# Patient Record
Sex: FEMALE | Race: BLACK OR AFRICAN AMERICAN | HISPANIC OR LATINO | URBAN - METROPOLITAN AREA
[De-identification: names, ages, dates, MRNs, and addresses within clinical notes are randomized per-mention and may not be internally consistent; named-entity substitution may affect disease eponyms.]

---

## 2002-04-01 RX ADMIN — Medication 250 MILLIGRAM(S): at 11:35

## 2016-12-12 NOTE — H&P ADULT. - PROBLEM SELECTOR PLAN 1
Admit 7 ABBIE/BMTU  TLC insertion today  Remove PICC line today  Star IV hydration and Melphalan today  Monitor blood counts, transfuse PRN, monitor electrolytes, supplement as need.  Monitor LFTs.  Monitor vital signs Q4hrs.  Maintain strict I/O  Protonix for GI protection.  Mouth care Mucositis prevention.  Antiviral, antifungal prophylaxis., start Cipro when ANC less than 1000.  Antiemetics PRN.  Manage pain, constipation PRN.  Start Heparin for VOD prophylaxis.  Glutamine and Heparin fro VOD prophylaxis.  Actigall fro liver protection. Admit 7 ABBIE/BMTU  TLCL insertion today by IR  Remove PICC line today  Star IV hydration as per transplant protocol  Melphalan 100 mg/m2 IV days -4, -3, followed by 2 days of rest.  Autologous PSCT on day 0(12/16/16).  Begin Zarxio 480 mcg sq daily on day 0, until engraftment with WBC recovery.  Monitor blood counts, transfuse PRN, monitor electrolytes, supplement as need.  Monitor LFTs.  Monitor vital signs Q4hrs.  Maintain strict I/O, daily weight, diurese with Lasix to keep O>I  Protonix for GI protection.  Mouth care and skin care as per transplant protocol.  Antiemetics PRN.  Manage pain, constipation PRN.

## 2016-12-12 NOTE — H&P ADULT. - PMH
Chronic gastritis, presence of bleeding unspecified, unspecified gastritis type    Clostridium difficile diarrhea    Essential hypertension    Intractable migraine with status migrainosus, unspecified migraine type    Leukemia    Sciatica of right side

## 2016-12-12 NOTE — H&P ADULT. - HISTORY OF PRESENT ILLNESS
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  The patient is a 56 year old woman with [place] chromosome positive ALL who has had 4 cycles R-HyperCVAD chemotherapy and  IT MTX twice and    s/p   step 1 high dose VP16 and cytarabine for stem cell mobilization for autologous stem cell transplant.  Recent bone marrow biopsy, FISH and PCR were negative. She presents today from home for her autologous stem cell  transplant. Denies any chest pain, palpitations, SOB, or dizziness. 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  The patient is a 56 year old woman with [place] chromosome positive ALL who was treated with 4 cycles R-HyperCVAD chemotherapy and  IT MTX twice and then Step 1 high dose VP16 and Cytarabine for intensification and stem cell mobilization for autologous peripheral stem cell transplant.  Recent bone marrow biopsy(12/1/16) with molecular CR, FISH and PCR were negative. She presents today from home for her autologous stem cell  transplant. Denies any chest pain, palpitations, SOB, or dizziness.

## 2016-12-12 NOTE — H&P ADULT. - ASSESSMENT
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

## 2016-12-12 NOTE — ADVANCED PRACTICE NURSE CONSULT - REASON FOR CONSULT
pt. was admitted for autologous  transplant, pt . has her TLC placed in the IR Today. for high dose chemo . and hydration and auto transplant. TLC intact , drsg dry and intact

## 2016-12-12 NOTE — PATIENT PROFILE ADULT. - VISION (WITH CORRECTIVE LENSES IF THE PATIENT USUALLY WEARS THEM):
Partially impaired: cannot see medication labels or newsprint, but can see obstacles in path, and the surrounding layout; can count fingers at arm's length

## 2016-12-12 NOTE — H&P ADULT. - PROBLEM SELECTOR PLAN 2
Antibacterial, antifungal, antiviral prophylaxis, as per transplant protocol.  Begin Actigall, glutamine supplement, low-dose heparin drip for VOD/SOS prophylaxis.  GI prophylaxis with PPI  Kepivance days 0, +1, +2 for prevention of mucositis.

## 2016-12-12 NOTE — ADVANCED PRACTICE NURSE CONSULT - ASSESSMENT
Pt was started with hydration D5NS at 200cc/hr, heparin at 4.4cc/hr .and NS at 20cc/hr.decadron was given IV.zofran 8mgs given IV and Ativan IV was refused by the pt. Melphalan was checked and re-checked with second RN on the floor started at 2040H [id number] finished at 2140H including the flushed. no reaction noted.

## 2016-12-12 NOTE — ADVANCED PRACTICE NURSE CONSULT - RECOMMEDATIONS
continue present meds, hydration and heparin drip. complainedofpain on TLC site , and cough, NP Marilyn made aware, pt already had her first dose of oxycodone at 1500H , but wants another dose, provider ordered oxycodone 5mgs po and tessalon perle x1 , as per pt. she felt better , lasix 20mgs given pt is behind with her output. and started urinating. will continue to monitor.

## 2016-12-14 NOTE — DIETITIAN INITIAL EVALUATION ADULT. - ADHERENCE
Pt reports she will try not to use salt in her food and will season her foods more often with Mrs. Dash

## 2016-12-14 NOTE — DIETITIAN INITIAL EVALUATION ADULT. - ENERGY NEEDS
Pt seen for new admit stem cell transplant. Pt with PMH including PH+ ALL S/P chemotherapy now admitted for autologous PBSCT day -2.     Ht:5'2" , Wt:230.6 lbs, BMI:42.2 kg/m2, IBW:110 lbs +/- 10%, %IBW: 236%  No edema, Skin intact

## 2016-12-14 NOTE — DIETITIAN INITIAL EVALUATION ADULT. - PROBLEM SELECTOR PLAN 1
Admit 7 ABBIE/BMTU  TLCL insertion today by IR  Remove PICC line today  Star IV hydration as per transplant protocol  Melphalan 100 mg/m2 IV days -4, -3, followed by 2 days of rest.  Autologous PSCT on day 0(12/16/16).  Begin Zarxio 480 mcg sq daily on day 0, until engraftment with WBC recovery.  Monitor blood counts, transfuse PRN, monitor electrolytes, supplement as need.  Monitor LFTs.  Monitor vital signs Q4hrs.  Maintain strict I/O, daily weight, diurese with Lasix to keep O>I  Protonix for GI protection.  Mouth care and skin care as per transplant protocol.  Antiemetics PRN.  Manage pain, constipation PRN.

## 2016-12-14 NOTE — DIETITIAN INITIAL EVALUATION ADULT. - NS AS NUTRI INTERV MEALS SNACK
1. Continue regular diet with glutasolve once daily, 2. Continue to encourage po intake and obtain/honor food preferences as able, 3. Continue to review food safety diet education as able, 3. Monitor PO intake, diet tolerance, weight trends, labs, and skin integrity, 4. RD to remain available as needed

## 2016-12-14 NOTE — DIETITIAN INITIAL EVALUATION ADULT. - ORAL INTAKE PTA
good/Pt reports eating well with good appetite consuming 2-3 meals per day. Breakfast: Interior instant breakfast or egg, Lunch: usually canned soup, Dinner: canned soup or protein and starch such as chicken and potato. Pt states she has been too tired to cook and has been doing mostly ready prepared foods.

## 2016-12-14 NOTE — DIETITIAN INITIAL EVALUATION ADULT. - NS AS NUTRI INTERV ED CONTENT
Provided brief food safety nutrition education-will review as able, emphasized importance of adequate nutrition during transplant process and reviewed importance and usage of glutasolve supplement

## 2016-12-14 NOTE — DIETITIAN INITIAL EVALUATION ADULT. - OTHER INFO
Pt reports weight fluctuations since the beginning of her illness, stated she previously weighed ~248 lbs and had lost weight then was able to gain a portion back and has since been relatively stable ~230 lbs, noted previous admit weights 232.1 lbs (8/1 standing), 227.7 lbs (11/4), current admit weight 230.6 lbs (12/12 standing). Pt reports food allergy to shellfish-will experience nausea and vomiting when consumed, pt reports previous intolerance to regular milk causing slight abdominal discomfort, has been consuming lactaid milk and feeling better. Pt taking folic acid PTA. Pt with no current GI distress, last BM yesterday. Pt with no difficulty chewing/swallowing. In house pt reports eating fairly, will generally consume ~75% of meals.

## 2016-12-27 NOTE — DISCHARGE NOTE ADULT - HOME CARE AGENCY
[facility] for medication teaching reinforcement with start of care for Saturday, 12/31/16. NARAYAN([phone]) for medication teaching reinforcement with start of care for Saturday, 12/31/16. NARAYAN([phone]) for medication teaching reinforcement with start of care for Thursday, 1/5/17.

## 2016-12-27 NOTE — DISCHARGE NOTE ADULT - CARE PROVIDERS DIRECT ADDRESSES
[email or web address]

## 2016-12-27 NOTE — DISCHARGE NOTE ADULT - CARE PLAN
Goal:	Complete blood count recovery, remain infection free, maintain optimal functional and nutritional status.  Instructions for follow-up, activity and diet:	Notify your physician if bleeding; swelling; persistent nausea and vomiting; unable to urinate; pain not relieved by medications; fever; numbness, tingling; excessive diarrhea, inability to tolerate liquids or foods; increased irritability or sluggishness, rash Principal Discharge DX:	Stem cells transplant status  Goal:	Complete blood count recovery, remain infection free, maintain optimal functional and nutritional status.  Instructions for follow-up, activity and diet:	Notify your physician if bleeding; swelling; persistent nausea and vomiting; unable to urinate; pain not relieved by medications; fever; numbness, tingling; excessive diarrhea, inability to tolerate liquids or foods; increased irritability or sluggishness, rash  Secondary Diagnosis:	Prophylactic measure  Goal:	Free of infection  Instructions for follow-up, activity and diet:	Continue on prophylactic antimicrobials as directed  Mepron - To prevent bacterial infection  Prilosec -GI prophylaxis  Acyclovir - To prevent viral infection  Secondary Diagnosis:	Fungal pneumonia  Goal:	Controlled  Instructions for follow-up, activity and diet:	Continue on Voriconazole as directed  Secondary Diagnosis:	Essential hypertension  Goal:	Controlled Principal Discharge DX:	Stem cells transplant status  Goal:	Complete blood count recovery, remain infection free, maintain optimal functional and nutritional status.  Instructions for follow-up, activity and diet:	Notify your physician if bleeding; swelling; persistent nausea and vomiting; unable to urinate; pain not relieved by medications; fever; numbness, tingling; excessive diarrhea, inability to tolerate liquids or foods; increased irritability or sluggishness, rash  Secondary Diagnosis:	Prophylactic measure  Goal:	Free of infection  Instructions for follow-up, activity and diet:	Continue on prophylactic antimicrobials as directed  Mepron - To prevent bacterial infection  Prilosec -GI prophylaxis  Acyclovir - To prevent viral infection  Secondary Diagnosis:	Fungal pneumonia  Goal:	Controlled  Instructions for follow-up, activity and diet:	Continue on Voriconazole as directed  Secondary Diagnosis:	Essential hypertension  Goal:	Controlled  Instructions for follow-up, activity and diet:	Activity as tolerated.  Take all medication as prescribed.  Follow up with your medical doctor for routine blood pressure monitoring at your next visit.  Notify your doctor if you have any of the following symptoms:   Dizziness, Lightheadedness, Blurry vision, Headache, Chest pain, Shortness of breath Principal Discharge DX:	Stem cells transplant status  Goal:	Complete blood count recovery, remain infection free, maintain optimal functional and nutritional status.  Instructions for follow-up, activity and diet:	Notify your physician if bleeding; swelling; persistent nausea and vomiting; unable to urinate; pain not relieved by medications; fever; numbness, tingling; excessive diarrhea, inability to tolerate liquids or foods; increased irritability or sluggishness, rash  Notify your doctor or go to the emergency room for a fever of 100.4F or greater, or a fever accompanied by shaking chills  Secondary Diagnosis:	Prophylactic measure  Goal:	Free of infection  Instructions for follow-up, activity and diet:	Continue on prophylactic antimicrobials as directed  Mepron - To prevent bacterial infection  Prilosec -GI prophylaxis  Acyclovir - To prevent viral infection  Secondary Diagnosis:	Fungal pneumonia  Goal:	Controlled  Instructions for follow-up, activity and diet:	Continue on Voriconazole as directed  Secondary Diagnosis:	Essential hypertension  Goal:	Controlled  Instructions for follow-up, activity and diet:	Activity as tolerated.  Take all medication as prescribed.  Follow up with your medical doctor for routine blood pressure monitoring at your next visit.  Notify your doctor if you have any of the following symptoms:   Dizziness, Lightheadedness, Blurry vision, Headache, Chest pain, Shortness of breath

## 2016-12-27 NOTE — DISCHARGE NOTE ADULT - CARE PROVIDER_API CALL
Luke Bentley), Internal Medicine; Medical Oncology  [address] [place] [address]  Phone: [phone]  Fax: [phone] Luke Bentley), Internal Medicine; Medical Oncology  [address] [place] [address]  Phone: [phone]  Fax: [phone]    Margo Burk (NP; RN), NP in Adult Health  [address] [place] [address]  Phone: [phone]  Fax: [phone]

## 2016-12-27 NOTE — DISCHARGE NOTE ADULT - NS AS ACTIVITY OBS
Do not make important decisions/Bathing allowed/No Heavy lifting/straining/Do not drive or operate machinery/Walking-Indoors allowed/Showering allowed

## 2016-12-27 NOTE — DISCHARGE NOTE ADULT - PATIENT PORTAL LINK FT
“You can access the FollowHealth Patient Portal, offered by [facility], by registering with the following website: [email or web address]”

## 2016-12-27 NOTE — DISCHARGE NOTE ADULT - REASON FOR ADMISSION
Admitted for admitted for high dose chemotherapy with Melphalan  (dosed at 100mg / m2  x 2 days) followed by autologous stem cell transplant. Autologous peripheral blood stem cell transplant with high dose chemotherapy preparative regimen for the treatment of ALL Ph +

## 2016-12-27 NOTE — DISCHARGE NOTE ADULT - INSTRUCTIONS
Diet and activities as per [facility] discharge guidelines and safe food handling guidelines. NO RESTAURANT OR TAKE OUT FOOD AT THIS TIME, ONLY HOME COOKED PREPARED/FROZEN FOODS. You are allowed to have fresh baked pizza right out of the oven. This is the ONLY takeout food at this time. none

## 2016-12-27 NOTE — DISCHARGE NOTE ADULT - SOCIAL WORKER'S NAME
Nati Fontenot LMSW ([phone]) will remain available at [facility]. Nati Fontenot LMSW ([phone]) will remain available at [facility].

## 2016-12-27 NOTE — DISCHARGE NOTE ADULT - PLAN OF CARE
Notify your physician if bleeding; swelling; persistent nausea and vomiting; unable to urinate; pain not relieved by medications; fever; numbness, tingling; excessive diarrhea, inability to tolerate liquids or foods; increased irritability or sluggishness, rash Complete blood count recovery, remain infection free, maintain optimal functional and nutritional status. Free of infection Continue on prophylactic antimicrobials as directed  Mepron - To prevent bacterial infection  Prilosec -GI prophylaxis  Acyclovir - To prevent viral infection Controlled Continue on Voriconazole as directed Activity as tolerated.  Take all medication as prescribed.  Follow up with your medical doctor for routine blood pressure monitoring at your next visit.  Notify your doctor if you have any of the following symptoms:   Dizziness, Lightheadedness, Blurry vision, Headache, Chest pain, Shortness of breath Notify your physician if bleeding; swelling; persistent nausea and vomiting; unable to urinate; pain not relieved by medications; fever; numbness, tingling; excessive diarrhea, inability to tolerate liquids or foods; increased irritability or sluggishness, rash  Notify your doctor or go to the emergency room for a fever of 100.4F or greater, or a fever accompanied by shaking chills

## 2016-12-27 NOTE — DISCHARGE NOTE ADULT - MEDICATION SUMMARY - MEDICATIONS TO TAKE
I will START or STAY ON the medications listed below when I get home from the hospital:    Cozaar 100 mg oral tablet  -- 1 tab(s) by mouth once a day  -- Indication: For HTN    Zofran 8 mg oral tablet  -- 1 tab(s) by mouth every 8 hours, As Needed for nausea  -- Indication: For As needed for nausea     voriconazole 200 mg oral tablet  -- 1 tab(s) by mouth every 12 hours MDD:2 tabs  -- Indication: For Lung nodule     acyclovir 400 mg oral tablet  -- 1 tab(s) by mouth every 8 hours MDD:3 tabs  -- Indication: For Antiviral prophylaxis     Mepron 750 mg/5 mL oral suspension  -- 5 milliliter(s) by mouth 2 times a day MDD:10 ml  -- Indication: For PCP prophylaxis     PriLOSEC 20 mg oral delayed release capsule  -- 1 cap(s) by mouth once a day  -- Indication: For GI prophylaxis     Multiple Vitamins oral tablet  -- 1 tab(s) by mouth once a day MDD:1 tab  -- Indication: For Supplement     folic acid 1 mg oral tablet  -- 1 tab(s) by mouth once a day MDD:1 tab  -- Indication: For Supplement

## 2016-12-27 NOTE — DISCHARGE NOTE ADULT - HOSPITAL COURSE
On admission, Mrs Galeas was scheduled for a triple lumen catheter A low dose heparin drip (dosed at 100 units / kg / day) was initiated as well as actigall and glutamine per BMTU protocol to prevent veno-occlusive disease. Throughout admission he received prophylaxis for oral and GI mucositis with PPI. Patient received viral, bacterial, fungal and PCP pneumonia prophylaxis. Patient  was given anti-nausea medications, pain control and anxiolytics PRN as well as diuretics to manage volume overload as needed. Folic acid and multivitamin were given daily throughout hospitalization.  Labs were monitored for CBC with differential, chemistries, coagulopathy, and hepatic profile. 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 Throughout admission he received prophylaxis for oral and GI mucositis with PPI. Patient received viral, bacterial, fungal and PCP pneumonia prophylaxis. Patient  was given anti-nausea medications, pain control and anxiolytics PRN as well as diuretics to manage volume overload as needed. Folic acid and multivitamin were given daily throughout hospitalization.  Labs were monitored for CBC with differential, chemistries, coagulopathy, and hepatic profile. 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    On admission, Mrs Galeas was scheduled for a triple lumen catheter A low dose heparin drip (dosed at 100 units / kg / day) was initiated as well as actigall and glutamine per BMTU protocol to prevent veno-occlusive disease. Throughout admission he received prophylaxis for oral and GI mucositis with PPI. Patient received viral, bacterial, fungal and PCP pneumonia prophylaxis. Patient  was given anti-nausea medications, pain control and anxiolytics PRN as well as diuretics to manage volume overload as needed. Folic acid and multivitamin were given daily throughout hospitalization.  Labs were monitored for CBC with differential, chemistries, coagulopathy, and hepatic profile. Electrolytes were repleted as needed.  On 12/16, pt received 331 ml of autologous, thawed, washed, pooled, mobilized, HPC apheresis. Pt tolerated infusion well. Daily neurogen was started on the same day. Kepivance was administered on 12/16, 12/17 and  12/18  Early engraftment was noted on 12/27  Hospital course was complicated by pancytopenia secondary to chemotherapy and disease and fungal pneumonia. When her ANC dropped below 1000, she was started on prophylactic Ciprofloxacin. When she spiked fever, her antibiotics coverage was broadened. Blood cultures were negative. On 12/22 CT chest revealed new 1 cm right lung nodule and was started on Voriconazole. Throughout hospitalization, Mrs Galeas maintained conditioning through ambulation.     On --- pt was prepared and ready for discharge. TLC was removed without complication. Homecare was coordinated by case management. Pt will follow-up at the [facility] to see Dr Bentley . 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    On admission, Mrs Galeas was scheduled for a triple lumen catheter A low dose heparin drip (dosed at 100 units / kg / day) was initiated as well as actigall and glutamine per BMTU protocol to prevent veno-occlusive disease. Throughout admission he received prophylaxis for oral and GI mucositis with PPI. Patient received viral, bacterial, fungal and PCP pneumonia prophylaxis. Patient  was given anti-nausea medications, pain control and anxiolytics PRN as well as diuretics to manage volume overload as needed. Folic acid and multivitamin were given daily throughout hospitalization.  Labs were monitored for CBC with differential, chemistries, coagulopathy, and hepatic profile. Electrolytes were repleted as needed.  On 12/16, pt received 331 ml of autologous, thawed, washed, pooled, mobilized, HPC apheresis. Pt tolerated infusion well. Daily neurogen was started on the same day. Kepivance was administered on 12/16, 12/17 and  12/18  Early engraftment was noted on 12/26  Hospital course was complicated by pancytopenia secondary to chemotherapy and disease and fungal pneumonia. When her ANC dropped below 1000, she was started on prophylactic Ciprofloxacin. When she spiked fever, her antibiotics coverage was broadened. Blood cultures were negative. On 12/22 CT chest revealed new 1 cm right lung nodule and was started on Voriconazole. Throughout hospitalization, Mrs Galeas maintained conditioning through ambulation.     On --- pt was prepared and ready for discharge. TLC was removed without complication. Homecare was coordinated by case management. Pt will follow-up at the [facility] to see Dr Bentley . 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    On admission, a triple lumen catheter was placed in interventional radiology .  Mrs Gutierrez received pain management, IV hydration, ant nausea medication, physical therapy, nutritional support and supplementation as needed. She also received prophylaxis for oral and GI mucositis with protonix, viral, bacterial, fungal and PCP pneumonia prophylaxis . When her ANC dropped below 1000, she was started on prophylactic ciprofloxacin. When she developed fevers, her antibiotic coverage was broadened. Her blood cultures and CXR remained negative. Patient  was given anti-nausea medications, pain control and anxiolytics PRN as well as diuretics to manage volume overload as needed. Folic acid and multivitamin were given daily throughout hospitalization. Blood work was monitored daily, and she received transfusional support and electrolyte repletion as needed.    On 12/16, pt received 331 ml of autologous, thawed, washed, pooled, mobilized, HPC apheresis. Pt tolerated infusion well. Daily neurogen was started on the same day. Kepivance was administered on 12/16, 12/17 and  12/18  Early engraftment was noted on 12/25. Mrs gutierrez  engrafted on 12/26/16. The zarxio was discontinued on 12/27/16, and the Meropenem was discontinued on ---  Mrs Gutierrez’s hospital course was complicated by pancytopenia secondary to chemotherapy and disease and fungal pneumonia. On 12/22 CT chest revealed new 1 cm right lung nodule and was started on Voriconazole. Throughout hospitalization, Mrs Gutierrez maintained conditioning through ambulation.   Currently, Mrs Gutierrez is hemodynamically stable and stable from a respiratory standpoint for discharge home with follow up at the [facility] as an outpatient. 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    On admission, a triple lumen catheter was placed in interventional radiology.  Mrs Gutierrez received pain management, IV hydration, ant nausea medication, physical therapy, nutritional support and supplementation as needed. She also received prophylaxis for oral and GI mucositis with protonix, viral, bacterial, fungal and PCP pneumonia prophylaxis . When her ANC dropped below 1000, she was started on prophylactic ciprofloxacin. When she developed fevers, her antibiotic coverage was broadened. Her blood cultures and CXR remained negative. Patient  was given anti-nausea medications, pain control and anxiolytics PRN as well as diuretics to manage volume overload as needed. Folic acid and multivitamin were given daily throughout hospitalization. Blood work was monitored daily, and she received transfusional support and electrolyte repletion as needed.    On 12/16, pt received 331 ml of autologous, thawed, washed, pooled, mobilized, HPC apheresis. Pt tolerated infusion well. Daily neurogen was started on the same day. Kepivance was administered on 12/16, 12/17 and  12/18  Early engraftment was noted on 12/25. Mrs gutierrez  engrafted on 12/26/16. The zarxio was discontinued on 12/27/16, and the Meropenem was discontinued on 1/3/17.   Mrs Gutierrez’s hospital course was complicated by pancytopenia secondary to chemotherapy and disease and fungal pneumonia. On 12/22 CT chest revealed new 1 cm right lung nodule and was started on Voriconazole. Ms. Gutierrez also had an RVP panel positive for rhinovirus / enterovirus. She received supportive care and had no complications. Throughout hospitalization, Mrs Gutierrez maintained conditioning through ambulation.   Currently, Mrs Gutierrez is hemodynamically stable and stable from a respiratory standpoint for discharge home with follow up at the [facility] as an outpatient.

## 2017-01-02 NOTE — PROVIDER CONTACT NOTE (OTHER) - ACTION/TREATMENT ORDERED:
Blood cultyres, U/A, urine culture
blood culture, urine culture.
blood culture x2 , ua, and urine culture
continue to monitor
tylenol 650 mg po

## 2017-01-03 NOTE — PROVIDER CONTACT NOTE (CRITICAL VALUE NOTIFICATION) - SITUATION
plt=10
PLT 12
Patient is diagnostic with ALL without remission
RSV+
hgb=6.8    hct=19.2    platelets=3
patient is diagnostic with ALL
patient is diagnostic with multiple myeloma
patient is diagnostic with multiple myeloma
platelets=10
platelets=10
platelets=14
platelets=<2
plt=11
plt=14
plt=17
wbc=9.2

## 2017-01-03 NOTE — PROVIDER CONTACT NOTE (CRITICAL VALUE NOTIFICATION) - NAME OF MD/NP/PA/DO NOTIFIED:
AKSHAT GLASS
José Manuel HAMPTON
Lola Ceballos
QUAN Del Castillo
QUAN Del Castillo
QUAN Quezada
QUNA Del Castillo
ar quick
shasta fu
wolfgang spring
QUAN Martinez
joana mahmood

## 2017-01-05 ENCOUNTER — OUTPATIENT (OUTPATIENT)
Dept: OUTPATIENT SERVICES | Facility: HOSPITAL | Age: 57
LOS: 1 days | End: 2017-01-05
Payer: COMMERCIAL

## 2017-01-05 ENCOUNTER — RESULT REVIEW (OUTPATIENT)
Age: 57
End: 2017-01-05

## 2017-01-05 ENCOUNTER — OUTPATIENT (OUTPATIENT)
Dept: OUTPATIENT SERVICES | Facility: HOSPITAL | Age: 57
LOS: 1 days | Discharge: HOME | End: 2017-01-05

## 2017-01-05 DIAGNOSIS — C91.00 ACUTE LYMPHOBLASTIC LEUKEMIA NOT HAVING ACHIEVED REMISSION: ICD-10-CM

## 2017-01-05 DIAGNOSIS — Z41.9 ENCOUNTER FOR PROCEDURE FOR PURPOSES OTHER THAN REMEDYING HEALTH STATE, UNSPECIFIED: Chronic | ICD-10-CM

## 2017-01-05 DIAGNOSIS — Z98.89 OTHER SPECIFIED POSTPROCEDURAL STATES: Chronic | ICD-10-CM

## 2017-01-06 ENCOUNTER — LABORATORY RESULT (OUTPATIENT)
Age: 57
End: 2017-01-06

## 2017-01-06 ENCOUNTER — APPOINTMENT (OUTPATIENT)
Dept: INFUSION THERAPY | Facility: HOSPITAL | Age: 57
End: 2017-01-06

## 2017-01-06 ENCOUNTER — APPOINTMENT (OUTPATIENT)
Dept: HEMATOLOGY ONCOLOGY | Facility: CLINIC | Age: 57
End: 2017-01-06

## 2017-01-06 LAB
BASOPHILS # BLD AUTO: 0.1 K/UL — SIGNIFICANT CHANGE UP (ref 0–0.2)
BASOPHILS NFR BLD AUTO: 1.3 % — SIGNIFICANT CHANGE UP (ref 0–2)
BLD GP AB SCN SERPL QL: NEGATIVE — SIGNIFICANT CHANGE UP
EOSINOPHIL # BLD AUTO: 0.1 K/UL — SIGNIFICANT CHANGE UP (ref 0–0.5)
EOSINOPHIL NFR BLD AUTO: 1 % — SIGNIFICANT CHANGE UP (ref 0–6)
HCT VFR BLD CALC: 32.1 % — LOW (ref 34.5–45)
HGB BLD-MCNC: 11.3 G/DL — LOW (ref 11.5–15.5)
LYMPHOCYTES # BLD AUTO: 28.8 % — SIGNIFICANT CHANGE UP (ref 13–44)
LYMPHOCYTES # BLD AUTO: 3.1 K/UL — SIGNIFICANT CHANGE UP (ref 1–3.3)
MCHC RBC-ENTMCNC: 34.3 PG — HIGH (ref 27–34)
MCHC RBC-ENTMCNC: 35.4 GM/DL — SIGNIFICANT CHANGE UP (ref 32–36)
MCV RBC AUTO: 97.1 FL — SIGNIFICANT CHANGE UP (ref 80–100)
MONOCYTES # BLD AUTO: 1.3 K/UL — HIGH (ref 0–0.9)
MONOCYTES NFR BLD AUTO: 11.9 % — SIGNIFICANT CHANGE UP (ref 2–14)
NEUTROPHILS # BLD AUTO: 6.1 K/UL — SIGNIFICANT CHANGE UP (ref 1.8–7.4)
NEUTROPHILS NFR BLD AUTO: 57.1 % — SIGNIFICANT CHANGE UP (ref 43–77)
PLATELET # BLD AUTO: 32 K/UL — LOW (ref 150–400)
RBC # BLD: 3.3 M/UL — LOW (ref 3.8–5.2)
RBC # FLD: 18.2 % — HIGH (ref 10.3–14.5)
RH IG SCN BLD-IMP: POSITIVE — SIGNIFICANT CHANGE UP
WBC # BLD: 10.6 K/UL — HIGH (ref 3.8–10.5)
WBC # FLD AUTO: 10.6 K/UL — HIGH (ref 3.8–10.5)

## 2017-01-09 ENCOUNTER — RESULT REVIEW (OUTPATIENT)
Age: 57
End: 2017-01-09

## 2017-01-10 ENCOUNTER — LABORATORY RESULT (OUTPATIENT)
Age: 57
End: 2017-01-10

## 2017-01-10 ENCOUNTER — APPOINTMENT (OUTPATIENT)
Dept: INFUSION THERAPY | Facility: HOSPITAL | Age: 57
End: 2017-01-10

## 2017-01-10 ENCOUNTER — APPOINTMENT (OUTPATIENT)
Dept: HEMATOLOGY ONCOLOGY | Facility: CLINIC | Age: 57
End: 2017-01-10

## 2017-01-10 VITALS
HEART RATE: 110 BPM | WEIGHT: 210.32 LBS | TEMPERATURE: 98.9 F | OXYGEN SATURATION: 100 % | RESPIRATION RATE: 16 BRPM | DIASTOLIC BLOOD PRESSURE: 82 MMHG | SYSTOLIC BLOOD PRESSURE: 114 MMHG | BODY MASS INDEX: 37.5 KG/M2

## 2017-01-10 LAB
BASOPHILS # BLD AUTO: 0.1 K/UL — SIGNIFICANT CHANGE UP (ref 0–0.2)
BASOPHILS NFR BLD AUTO: 1 % — SIGNIFICANT CHANGE UP (ref 0–2)
BLASTS # FLD: 1 % — HIGH (ref 0–0)
BLD GP AB SCN SERPL QL: NEGATIVE — SIGNIFICANT CHANGE UP
EOSINOPHIL # BLD AUTO: 0.2 K/UL — SIGNIFICANT CHANGE UP (ref 0–0.5)
EOSINOPHIL NFR BLD AUTO: 3 % — SIGNIFICANT CHANGE UP (ref 0–6)
HCT VFR BLD CALC: 33.2 % — LOW (ref 34.5–45)
HGB BLD-MCNC: 11 G/DL — LOW (ref 11.5–15.5)
LYMPHOCYTES # BLD AUTO: 2.7 K/UL — SIGNIFICANT CHANGE UP (ref 1–3.3)
LYMPHOCYTES # BLD AUTO: 35 % — SIGNIFICANT CHANGE UP (ref 13–44)
MCHC RBC-ENTMCNC: 32.6 PG — SIGNIFICANT CHANGE UP (ref 27–34)
MCHC RBC-ENTMCNC: 33 GM/DL — SIGNIFICANT CHANGE UP (ref 32–36)
MCV RBC AUTO: 98.5 FL — SIGNIFICANT CHANGE UP (ref 80–100)
MONOCYTES # BLD AUTO: 1.5 K/UL — HIGH (ref 0–0.9)
MONOCYTES NFR BLD AUTO: 21 % — HIGH (ref 2–14)
NEUTROPHILS # BLD AUTO: 2.2 K/UL — SIGNIFICANT CHANGE UP (ref 1.8–7.4)
NEUTROPHILS NFR BLD AUTO: 38 % — LOW (ref 43–77)
NEUTS BAND # BLD: 1 % — SIGNIFICANT CHANGE UP (ref 0–8)
PLAT MORPH BLD: NORMAL — SIGNIFICANT CHANGE UP
PLATELET # BLD AUTO: 31 K/UL — LOW (ref 150–400)
RBC # BLD: 3.37 M/UL — LOW (ref 3.8–5.2)
RBC # FLD: 17.8 % — HIGH (ref 10.3–14.5)
RBC BLD AUTO: SIGNIFICANT CHANGE UP
RH IG SCN BLD-IMP: POSITIVE — SIGNIFICANT CHANGE UP
WBC # BLD: 6.8 K/UL — SIGNIFICANT CHANGE UP (ref 3.8–10.5)
WBC # FLD AUTO: 6.8 K/UL — SIGNIFICANT CHANGE UP (ref 3.8–10.5)

## 2017-01-10 PROCEDURE — 86850 RBC ANTIBODY SCREEN: CPT

## 2017-01-10 PROCEDURE — 86901 BLOOD TYPING SEROLOGIC RH(D): CPT

## 2017-01-10 PROCEDURE — 86900 BLOOD TYPING SEROLOGIC ABO: CPT

## 2017-01-13 ENCOUNTER — RESULT REVIEW (OUTPATIENT)
Age: 57
End: 2017-01-13

## 2017-01-14 ENCOUNTER — APPOINTMENT (OUTPATIENT)
Dept: INFUSION THERAPY | Facility: HOSPITAL | Age: 57
End: 2017-01-14

## 2017-01-14 LAB
ANISOCYTOSIS BLD QL: SLIGHT — SIGNIFICANT CHANGE UP
BASOPHILS # BLD AUTO: 0.2 K/UL — SIGNIFICANT CHANGE UP (ref 0–0.2)
DACRYOCYTES BLD QL SMEAR: SLIGHT — SIGNIFICANT CHANGE UP
ELLIPTOCYTES BLD QL SMEAR: SLIGHT — SIGNIFICANT CHANGE UP
EOSINOPHIL # BLD AUTO: 0.3 K/UL — SIGNIFICANT CHANGE UP (ref 0–0.5)
EOSINOPHIL NFR BLD AUTO: 2 % — SIGNIFICANT CHANGE UP (ref 0–6)
HCT VFR BLD CALC: 31.9 % — LOW (ref 34.5–45)
HGB BLD-MCNC: 10.7 G/DL — LOW (ref 11.5–15.5)
HYPOCHROMIA BLD QL: SLIGHT — SIGNIFICANT CHANGE UP
LYMPHOCYTES # BLD AUTO: 61 % — HIGH (ref 13–44)
LYMPHOCYTES # BLD AUTO: 7.8 K/UL — HIGH (ref 1–3.3)
MACROCYTES BLD QL: SLIGHT — SIGNIFICANT CHANGE UP
MCHC RBC-ENTMCNC: 33.4 GM/DL — SIGNIFICANT CHANGE UP (ref 32–36)
MCHC RBC-ENTMCNC: 33.5 PG — SIGNIFICANT CHANGE UP (ref 27–34)
MCV RBC AUTO: 100 FL — SIGNIFICANT CHANGE UP (ref 80–100)
METAMYELOCYTES # FLD: 1 % — HIGH (ref 0–0)
MICROCYTES BLD QL: SLIGHT — SIGNIFICANT CHANGE UP
MONOCYTES # BLD AUTO: 0.8 K/UL — SIGNIFICANT CHANGE UP (ref 0–0.9)
MONOCYTES NFR BLD AUTO: 11 % — SIGNIFICANT CHANGE UP (ref 2–14)
NEUTROPHILS # BLD AUTO: 2.6 K/UL — SIGNIFICANT CHANGE UP (ref 1.8–7.4)
NEUTROPHILS NFR BLD AUTO: 24 % — LOW (ref 43–77)
NEUTS BAND # BLD: 1 % — SIGNIFICANT CHANGE UP (ref 0–8)
PLAT MORPH BLD: NORMAL — SIGNIFICANT CHANGE UP
PLATELET # BLD AUTO: 31 K/UL — LOW (ref 150–400)
POIKILOCYTOSIS BLD QL AUTO: SLIGHT — SIGNIFICANT CHANGE UP
POLYCHROMASIA BLD QL SMEAR: SLIGHT — SIGNIFICANT CHANGE UP
RBC # BLD: 3.19 M/UL — LOW (ref 3.8–5.2)
RBC # FLD: 19.1 % — HIGH (ref 10.3–14.5)
RBC BLD AUTO: ABNORMAL
WBC # BLD: 11.8 K/UL — HIGH (ref 3.8–10.5)
WBC # FLD AUTO: 11.8 K/UL — HIGH (ref 3.8–10.5)

## 2017-01-16 ENCOUNTER — RESULT REVIEW (OUTPATIENT)
Age: 57
End: 2017-01-16

## 2017-01-17 ENCOUNTER — APPOINTMENT (OUTPATIENT)
Dept: INFUSION THERAPY | Facility: HOSPITAL | Age: 57
End: 2017-01-17

## 2017-01-17 ENCOUNTER — APPOINTMENT (OUTPATIENT)
Dept: HEMATOLOGY ONCOLOGY | Facility: CLINIC | Age: 57
End: 2017-01-17

## 2017-01-17 VITALS
TEMPERATURE: 98.43 F | BODY MASS INDEX: 37.34 KG/M2 | WEIGHT: 209.44 LBS | OXYGEN SATURATION: 98 % | DIASTOLIC BLOOD PRESSURE: 78 MMHG | HEART RATE: 110 BPM | SYSTOLIC BLOOD PRESSURE: 112 MMHG | RESPIRATION RATE: 16 BRPM

## 2017-01-17 LAB
ANISOCYTOSIS BLD QL: SLIGHT — SIGNIFICANT CHANGE UP
BASOPHILS # BLD AUTO: 0.2 K/UL — SIGNIFICANT CHANGE UP (ref 0–0.2)
DACRYOCYTES BLD QL SMEAR: SLIGHT — SIGNIFICANT CHANGE UP
EOSINOPHIL # BLD AUTO: 0.6 K/UL — HIGH (ref 0–0.5)
EOSINOPHIL NFR BLD AUTO: 3 % — SIGNIFICANT CHANGE UP (ref 0–6)
HCT VFR BLD CALC: 31.2 % — LOW (ref 34.5–45)
HGB BLD-MCNC: 10.5 G/DL — LOW (ref 11.5–15.5)
LYMPHOCYTES # BLD AUTO: 11.8 K/UL — HIGH (ref 1–3.3)
LYMPHOCYTES # BLD AUTO: 75 % — HIGH (ref 13–44)
MACROCYTES BLD QL: SLIGHT — SIGNIFICANT CHANGE UP
MCHC RBC-ENTMCNC: 33.8 GM/DL — SIGNIFICANT CHANGE UP (ref 32–36)
MCHC RBC-ENTMCNC: 34 PG — SIGNIFICANT CHANGE UP (ref 27–34)
MCV RBC AUTO: 101 FL — HIGH (ref 80–100)
MONOCYTES # BLD AUTO: 1.2 K/UL — HIGH (ref 0–0.9)
MONOCYTES NFR BLD AUTO: 4 % — SIGNIFICANT CHANGE UP (ref 2–14)
NEUTROPHILS # BLD AUTO: 2.8 K/UL — SIGNIFICANT CHANGE UP (ref 1.8–7.4)
NEUTROPHILS NFR BLD AUTO: 18 % — LOW (ref 43–77)
PLAT MORPH BLD: NORMAL — SIGNIFICANT CHANGE UP
PLATELET # BLD AUTO: 32 K/UL — LOW (ref 150–400)
POIKILOCYTOSIS BLD QL AUTO: SLIGHT — SIGNIFICANT CHANGE UP
RBC # BLD: 3.1 M/UL — LOW (ref 3.8–5.2)
RBC # FLD: 18.9 % — HIGH (ref 10.3–14.5)
RBC BLD AUTO: ABNORMAL
WBC # BLD: 16.4 K/UL — HIGH (ref 3.8–10.5)
WBC # FLD AUTO: 16.4 K/UL — HIGH (ref 3.8–10.5)

## 2017-01-19 ENCOUNTER — RX RENEWAL (OUTPATIENT)
Age: 57
End: 2017-01-19

## 2017-01-20 ENCOUNTER — APPOINTMENT (OUTPATIENT)
Dept: INFUSION THERAPY | Facility: HOSPITAL | Age: 57
End: 2017-01-20

## 2017-01-23 ENCOUNTER — RESULT REVIEW (OUTPATIENT)
Age: 57
End: 2017-01-23

## 2017-01-24 ENCOUNTER — APPOINTMENT (OUTPATIENT)
Dept: INFUSION THERAPY | Facility: HOSPITAL | Age: 57
End: 2017-01-24

## 2017-01-24 ENCOUNTER — APPOINTMENT (OUTPATIENT)
Dept: HEMATOLOGY ONCOLOGY | Facility: CLINIC | Age: 57
End: 2017-01-24

## 2017-01-24 VITALS
SYSTOLIC BLOOD PRESSURE: 116 MMHG | BODY MASS INDEX: 37.54 KG/M2 | RESPIRATION RATE: 16 BRPM | DIASTOLIC BLOOD PRESSURE: 80 MMHG | TEMPERATURE: 98.4 F | HEART RATE: 87 BPM | OXYGEN SATURATION: 97 % | WEIGHT: 210.54 LBS

## 2017-01-24 LAB
ANISOCYTOSIS BLD QL: SLIGHT — SIGNIFICANT CHANGE UP
DACRYOCYTES BLD QL SMEAR: SLIGHT — SIGNIFICANT CHANGE UP
ELLIPTOCYTES BLD QL SMEAR: SLIGHT — SIGNIFICANT CHANGE UP
EOSINOPHIL # BLD AUTO: 0.3 K/UL — SIGNIFICANT CHANGE UP (ref 0–0.5)
EOSINOPHIL NFR BLD AUTO: 3 % — SIGNIFICANT CHANGE UP (ref 0–6)
HCT VFR BLD CALC: 31.7 % — LOW (ref 34.5–45)
HGB BLD-MCNC: 10.4 G/DL — LOW (ref 11.5–15.5)
LYMPHOCYTES # BLD AUTO: 66 % — HIGH (ref 13–44)
LYMPHOCYTES # BLD AUTO: 8.2 K/UL — HIGH (ref 1–3.3)
MACROCYTES BLD QL: SLIGHT — SIGNIFICANT CHANGE UP
MCHC RBC-ENTMCNC: 32.7 GM/DL — SIGNIFICANT CHANGE UP (ref 32–36)
MCHC RBC-ENTMCNC: 34.3 PG — HIGH (ref 27–34)
MCV RBC AUTO: 105 FL — HIGH (ref 80–100)
MONOCYTES # BLD AUTO: 1 K/UL — HIGH (ref 0–0.9)
MONOCYTES NFR BLD AUTO: 13 % — SIGNIFICANT CHANGE UP (ref 2–14)
NEUTROPHILS # BLD AUTO: 1 K/UL — LOW (ref 1.8–7.4)
NEUTROPHILS NFR BLD AUTO: 18 % — LOW (ref 43–77)
PLAT MORPH BLD: NORMAL — SIGNIFICANT CHANGE UP
PLATELET # BLD AUTO: 56 K/UL — LOW (ref 150–400)
POIKILOCYTOSIS BLD QL AUTO: SLIGHT — SIGNIFICANT CHANGE UP
RBC # BLD: 3.02 M/UL — LOW (ref 3.8–5.2)
RBC # FLD: 19.3 % — HIGH (ref 10.3–14.5)
RBC BLD AUTO: ABNORMAL
WBC # BLD: 10.6 K/UL — HIGH (ref 3.8–10.5)
WBC # FLD AUTO: 10.6 K/UL — HIGH (ref 3.8–10.5)

## 2017-01-25 ENCOUNTER — CLINICAL ADVICE (OUTPATIENT)
Age: 57
End: 2017-01-25

## 2017-01-25 LAB
ALBUMIN SERPL ELPH-MCNC: 3.6 G/DL
ALP BLD-CCNC: 401 U/L
ALT SERPL-CCNC: 90 U/L
ANION GAP SERPL CALC-SCNC: 13 MMOL/L
AST SERPL-CCNC: 67 U/L
BILIRUB SERPL-MCNC: 0.3 MG/DL
BUN SERPL-MCNC: 15 MG/DL
CALCIUM SERPL-MCNC: 10.2 MG/DL
CHLORIDE SERPL-SCNC: 108 MMOL/L
CO2 SERPL-SCNC: 24 MMOL/L
CREAT SERPL-MCNC: 0.92 MG/DL
GLUCOSE SERPL-MCNC: 103 MG/DL
LDH SERPL-CCNC: 279 U/L
MAGNESIUM SERPL-MCNC: 2.1 MG/DL
POTASSIUM SERPL-SCNC: 4.9 MMOL/L
PROT SERPL-MCNC: 5.6 G/DL
SODIUM SERPL-SCNC: 145 MMOL/L

## 2017-01-27 ENCOUNTER — CHART COPY (OUTPATIENT)
Age: 57
End: 2017-01-27

## 2017-01-27 ENCOUNTER — APPOINTMENT (OUTPATIENT)
Dept: INFUSION THERAPY | Facility: HOSPITAL | Age: 57
End: 2017-01-27

## 2017-01-30 NOTE — H&P ADULT. - PRESSURE ULCER
Refill request for metformin and Lantus. Both meds refilled 1/2/17 with refills.   Requests denied, refills on file None.

## 2017-02-01 ENCOUNTER — RESULT REVIEW (OUTPATIENT)
Age: 57
End: 2017-02-01

## 2017-02-02 ENCOUNTER — APPOINTMENT (OUTPATIENT)
Dept: HEMATOLOGY ONCOLOGY | Facility: CLINIC | Age: 57
End: 2017-02-02

## 2017-02-02 LAB
BASOPHILS # BLD AUTO: 0.1 K/UL — SIGNIFICANT CHANGE UP (ref 0–0.2)
EOSINOPHIL # BLD AUTO: 0.8 K/UL — HIGH (ref 0–0.5)
EOSINOPHIL NFR BLD AUTO: 6 % — SIGNIFICANT CHANGE UP (ref 0–6)
HCT VFR BLD CALC: 36.1 % — SIGNIFICANT CHANGE UP (ref 34.5–45)
HGB BLD-MCNC: 11.6 G/DL — SIGNIFICANT CHANGE UP (ref 11.5–15.5)
LYMPHOCYTES # BLD AUTO: 61 % — HIGH (ref 13–44)
LYMPHOCYTES # BLD AUTO: 7.4 K/UL — HIGH (ref 1–3.3)
MCHC RBC-ENTMCNC: 32 GM/DL — SIGNIFICANT CHANGE UP (ref 32–36)
MCHC RBC-ENTMCNC: 34.4 PG — HIGH (ref 27–34)
MCV RBC AUTO: 107 FL — HIGH (ref 80–100)
MONOCYTES # BLD AUTO: 1.7 K/UL — HIGH (ref 0–0.9)
MONOCYTES NFR BLD AUTO: 14 % — SIGNIFICANT CHANGE UP (ref 2–14)
NEUTROPHILS # BLD AUTO: 1.4 K/UL — LOW (ref 1.8–7.4)
NEUTROPHILS NFR BLD AUTO: 15 % — LOW (ref 43–77)
NEUTS BAND # BLD: 1 % — SIGNIFICANT CHANGE UP (ref 0–8)
PLAT MORPH BLD: NORMAL — SIGNIFICANT CHANGE UP
PLATELET # BLD AUTO: 78 K/UL — LOW (ref 150–400)
RBC # BLD: 3.36 M/UL — LOW (ref 3.8–5.2)
RBC # FLD: 18.5 % — HIGH (ref 10.3–14.5)
RBC BLD AUTO: SIGNIFICANT CHANGE UP
VARIANT LYMPHS # BLD: 3 % — SIGNIFICANT CHANGE UP (ref 0–6)
WBC # BLD: 11.5 K/UL — HIGH (ref 3.8–10.5)
WBC # FLD AUTO: 11.5 K/UL — HIGH (ref 3.8–10.5)

## 2017-02-03 ENCOUNTER — CHART COPY (OUTPATIENT)
Age: 57
End: 2017-02-03

## 2017-02-03 LAB
ALBUMIN SERPL ELPH-MCNC: 4 G/DL
ALP BLD-CCNC: 350 U/L
ALT SERPL-CCNC: 66 U/L
ANION GAP SERPL CALC-SCNC: 15 MMOL/L
AST SERPL-CCNC: 42 U/L
BILIRUB SERPL-MCNC: 0.4 MG/DL
BUN SERPL-MCNC: 17 MG/DL
CALCIUM SERPL-MCNC: 10.4 MG/DL
CHLORIDE SERPL-SCNC: 105 MMOL/L
CO2 SERPL-SCNC: 23 MMOL/L
CREAT SERPL-MCNC: 1.02 MG/DL
GLUCOSE SERPL-MCNC: 113 MG/DL
LDH SERPL-CCNC: 268 U/L
MAGNESIUM SERPL-MCNC: 1.8 MG/DL
POTASSIUM SERPL-SCNC: 4.3 MMOL/L
PROT SERPL-MCNC: 6 G/DL
SODIUM SERPL-SCNC: 143 MMOL/L

## 2017-02-06 ENCOUNTER — RESULT REVIEW (OUTPATIENT)
Age: 57
End: 2017-02-06

## 2017-02-06 ENCOUNTER — OUTPATIENT (OUTPATIENT)
Dept: OUTPATIENT SERVICES | Facility: HOSPITAL | Age: 57
LOS: 1 days | Discharge: HOME | End: 2017-02-06

## 2017-02-06 DIAGNOSIS — C91.00 ACUTE LYMPHOBLASTIC LEUKEMIA NOT HAVING ACHIEVED REMISSION: ICD-10-CM

## 2017-02-06 DIAGNOSIS — Z98.89 OTHER SPECIFIED POSTPROCEDURAL STATES: Chronic | ICD-10-CM

## 2017-02-06 DIAGNOSIS — Z41.9 ENCOUNTER FOR PROCEDURE FOR PURPOSES OTHER THAN REMEDYING HEALTH STATE, UNSPECIFIED: Chronic | ICD-10-CM

## 2017-02-07 ENCOUNTER — APPOINTMENT (OUTPATIENT)
Dept: HEMATOLOGY ONCOLOGY | Facility: CLINIC | Age: 57
End: 2017-02-07

## 2017-02-07 VITALS
OXYGEN SATURATION: 100 % | DIASTOLIC BLOOD PRESSURE: 84 MMHG | TEMPERATURE: 98.3 F | SYSTOLIC BLOOD PRESSURE: 123 MMHG | WEIGHT: 207.23 LBS | RESPIRATION RATE: 16 BRPM | HEART RATE: 85 BPM | BODY MASS INDEX: 36.95 KG/M2

## 2017-02-07 LAB
ALBUMIN SERPL ELPH-MCNC: 3.9 G/DL
ALP BLD-CCNC: 337 U/L
ALT SERPL-CCNC: 49 U/L
ANION GAP SERPL CALC-SCNC: 14 MMOL/L
AST SERPL-CCNC: 28 U/L
BILIRUB SERPL-MCNC: 0.2 MG/DL
BUN SERPL-MCNC: 15 MG/DL
CALCIUM SERPL-MCNC: 11 MG/DL
CHLORIDE SERPL-SCNC: 107 MMOL/L
CO2 SERPL-SCNC: 24 MMOL/L
CREAT SERPL-MCNC: 0.86 MG/DL
EOSINOPHIL NFR BLD AUTO: 11 % — HIGH (ref 0–6)
GLUCOSE SERPL-MCNC: 112 MG/DL
HCT VFR BLD CALC: 35.8 % — SIGNIFICANT CHANGE UP (ref 34.5–45)
HGB BLD-MCNC: 11.6 G/DL — SIGNIFICANT CHANGE UP (ref 11.5–15.5)
LDH SERPL-CCNC: 278 U/L
LYMPHOCYTES # BLD AUTO: 57 % — HIGH (ref 13–44)
LYMPHOCYTES # BLD AUTO: SIGNIFICANT CHANGE UP (ref 1–3.3)
MAGNESIUM SERPL-MCNC: 1.9 MG/DL
MCHC RBC-ENTMCNC: 32.6 GM/DL — SIGNIFICANT CHANGE UP (ref 32–36)
MCHC RBC-ENTMCNC: 35 PG — HIGH (ref 27–34)
MCV RBC AUTO: 107 FL — HIGH (ref 80–100)
MONOCYTES NFR BLD AUTO: 6 % — SIGNIFICANT CHANGE UP (ref 2–14)
NEUTROPHILS # BLD AUTO: SIGNIFICANT CHANGE UP (ref 1.8–7.4)
NEUTROPHILS NFR BLD AUTO: 26 % — LOW (ref 43–77)
PLAT MORPH BLD: NORMAL — SIGNIFICANT CHANGE UP
PLATELET # BLD AUTO: 87 K/UL — LOW (ref 150–400)
POTASSIUM SERPL-SCNC: 4.7 MMOL/L
PROT SERPL-MCNC: 6 G/DL
RBC # BLD: 3.33 M/UL — LOW (ref 3.8–5.2)
RBC # FLD: 18 % — HIGH (ref 10.3–14.5)
RBC BLD AUTO: SIGNIFICANT CHANGE UP
SODIUM SERPL-SCNC: 145 MMOL/L
WBC # BLD: 11.8 K/UL — HIGH (ref 3.8–10.5)
WBC # FLD AUTO: 11.8 K/UL — HIGH (ref 3.8–10.5)

## 2017-02-20 ENCOUNTER — RESULT REVIEW (OUTPATIENT)
Age: 57
End: 2017-02-20

## 2017-02-21 ENCOUNTER — APPOINTMENT (OUTPATIENT)
Dept: HEMATOLOGY ONCOLOGY | Facility: CLINIC | Age: 57
End: 2017-02-21

## 2017-02-21 LAB
BASOPHILS # BLD AUTO: 0.1 K/UL — SIGNIFICANT CHANGE UP (ref 0–0.2)
BASOPHILS NFR BLD AUTO: 0.9 % — SIGNIFICANT CHANGE UP (ref 0–2)
EOSINOPHIL # BLD AUTO: 0.4 K/UL — SIGNIFICANT CHANGE UP (ref 0–0.5)
EOSINOPHIL NFR BLD AUTO: 5.4 % — SIGNIFICANT CHANGE UP (ref 0–6)
HCT VFR BLD CALC: 34.7 % — SIGNIFICANT CHANGE UP (ref 34.5–45)
HGB BLD-MCNC: 11.7 G/DL — SIGNIFICANT CHANGE UP (ref 11.5–15.5)
LYMPHOCYTES # BLD AUTO: 4.3 K/UL — HIGH (ref 1–3.3)
LYMPHOCYTES # BLD AUTO: 63.5 % — HIGH (ref 13–44)
MCHC RBC-ENTMCNC: 33.8 G/DL — SIGNIFICANT CHANGE UP (ref 32–36)
MCHC RBC-ENTMCNC: 36.4 PG — HIGH (ref 27–34)
MCV RBC AUTO: 108 FL — HIGH (ref 80–100)
MONOCYTES # BLD AUTO: 1 K/UL — HIGH (ref 0–0.9)
MONOCYTES NFR BLD AUTO: 15.5 % — HIGH (ref 2–14)
NEUTROPHILS # BLD AUTO: 1 K/UL — LOW (ref 1.8–7.4)
NEUTROPHILS NFR BLD AUTO: 14.8 % — LOW (ref 43–77)
PLATELET # BLD AUTO: 85 K/UL — LOW (ref 150–400)
RBC # BLD: 3.22 M/UL — LOW (ref 3.8–5.2)
RBC # FLD: 16.6 % — HIGH (ref 10.3–14.5)
WBC # BLD: 6.7 K/UL — SIGNIFICANT CHANGE UP (ref 3.8–10.5)
WBC # FLD AUTO: 6.7 K/UL — SIGNIFICANT CHANGE UP (ref 3.8–10.5)

## 2017-02-22 LAB
ALBUMIN SERPL ELPH-MCNC: 3.9 G/DL
ALP BLD-CCNC: 360 U/L
ALT SERPL-CCNC: 34 U/L
ANION GAP SERPL CALC-SCNC: 15 MMOL/L
AST SERPL-CCNC: 16 U/L
BILIRUB SERPL-MCNC: 0.3 MG/DL
BUN SERPL-MCNC: 18 MG/DL
CALCIUM SERPL-MCNC: 10.7 MG/DL
CHLORIDE SERPL-SCNC: 105 MMOL/L
CO2 SERPL-SCNC: 25 MMOL/L
CREAT SERPL-MCNC: 0.95 MG/DL
GLUCOSE SERPL-MCNC: 100 MG/DL
LDH SERPL-CCNC: 272 U/L
MAGNESIUM SERPL-MCNC: 2.3 MG/DL
POTASSIUM SERPL-SCNC: 4.9 MMOL/L
PROT SERPL-MCNC: 6 G/DL
SODIUM SERPL-SCNC: 145 MMOL/L

## 2017-02-27 ENCOUNTER — RX RENEWAL (OUTPATIENT)
Age: 57
End: 2017-02-27

## 2017-03-08 ENCOUNTER — RX RENEWAL (OUTPATIENT)
Age: 57
End: 2017-03-08

## 2017-03-13 ENCOUNTER — OUTPATIENT (OUTPATIENT)
Dept: OUTPATIENT SERVICES | Facility: HOSPITAL | Age: 57
LOS: 1 days | Discharge: HOME | End: 2017-03-13

## 2017-03-13 DIAGNOSIS — C91.00 ACUTE LYMPHOBLASTIC LEUKEMIA NOT HAVING ACHIEVED REMISSION: ICD-10-CM

## 2017-03-13 DIAGNOSIS — Z41.9 ENCOUNTER FOR PROCEDURE FOR PURPOSES OTHER THAN REMEDYING HEALTH STATE, UNSPECIFIED: Chronic | ICD-10-CM

## 2017-03-13 DIAGNOSIS — Z98.89 OTHER SPECIFIED POSTPROCEDURAL STATES: Chronic | ICD-10-CM

## 2017-03-15 ENCOUNTER — APPOINTMENT (OUTPATIENT)
Dept: HEMATOLOGY ONCOLOGY | Facility: CLINIC | Age: 57
End: 2017-03-15

## 2017-03-17 ENCOUNTER — CHART COPY (OUTPATIENT)
Age: 57
End: 2017-03-17

## 2017-03-21 ENCOUNTER — RESULT REVIEW (OUTPATIENT)
Age: 57
End: 2017-03-21

## 2017-03-22 ENCOUNTER — APPOINTMENT (OUTPATIENT)
Dept: HEMATOLOGY ONCOLOGY | Facility: CLINIC | Age: 57
End: 2017-03-22

## 2017-03-22 VITALS
DIASTOLIC BLOOD PRESSURE: 80 MMHG | TEMPERATURE: 98.3 F | HEART RATE: 75 BPM | SYSTOLIC BLOOD PRESSURE: 140 MMHG | WEIGHT: 211.64 LBS | RESPIRATION RATE: 16 BRPM | OXYGEN SATURATION: 100 % | BODY MASS INDEX: 37.74 KG/M2

## 2017-03-22 LAB
BASOPHILS # BLD AUTO: 0.1 K/UL — SIGNIFICANT CHANGE UP (ref 0–0.2)
BASOPHILS NFR BLD AUTO: 1.2 % — SIGNIFICANT CHANGE UP (ref 0–2)
EOSINOPHIL # BLD AUTO: 0.2 K/UL — SIGNIFICANT CHANGE UP (ref 0–0.5)
EOSINOPHIL NFR BLD AUTO: 2 % — SIGNIFICANT CHANGE UP (ref 0–6)
HCT VFR BLD CALC: 31.3 % — LOW (ref 34.5–45)
HGB BLD-MCNC: 10.4 G/DL — LOW (ref 11.5–15.5)
LYMPHOCYTES # BLD AUTO: 3.6 K/UL — HIGH (ref 1–3.3)
LYMPHOCYTES # BLD AUTO: 48.5 % — HIGH (ref 13–44)
MCHC RBC-ENTMCNC: 33.2 G/DL — SIGNIFICANT CHANGE UP (ref 32–36)
MCHC RBC-ENTMCNC: 36.5 PG — HIGH (ref 27–34)
MCV RBC AUTO: 110 FL — HIGH (ref 80–100)
MONOCYTES # BLD AUTO: 0.7 K/UL — SIGNIFICANT CHANGE UP (ref 0–0.9)
MONOCYTES NFR BLD AUTO: 10 % — SIGNIFICANT CHANGE UP (ref 2–14)
NEUTROPHILS # BLD AUTO: 2.8 K/UL — SIGNIFICANT CHANGE UP (ref 1.8–7.4)
NEUTROPHILS NFR BLD AUTO: 38.3 % — LOW (ref 43–77)
PLATELET # BLD AUTO: 110 K/UL — LOW (ref 150–400)
RBC # BLD: 2.85 M/UL — LOW (ref 3.8–5.2)
RBC # FLD: 15.7 % — HIGH (ref 10.3–14.5)
WBC # BLD: 7.4 K/UL — SIGNIFICANT CHANGE UP (ref 3.8–10.5)
WBC # FLD AUTO: 7.4 K/UL — SIGNIFICANT CHANGE UP (ref 3.8–10.5)

## 2017-03-22 RX ORDER — AZITHROMYCIN 250 MG/1
250 TABLET, FILM COATED ORAL
Qty: 6 | Refills: 0 | Status: DISCONTINUED | COMMUNITY
Start: 2017-02-21 | End: 2017-03-22

## 2017-03-23 LAB
ALBUMIN SERPL ELPH-MCNC: 3.6 G/DL
ALP BLD-CCNC: 195 U/L
ALT SERPL-CCNC: 33 U/L
ANION GAP SERPL CALC-SCNC: 12 MMOL/L
AST SERPL-CCNC: 14 U/L
BILIRUB SERPL-MCNC: 0.3 MG/DL
BUN SERPL-MCNC: 17 MG/DL
CALCIUM SERPL-MCNC: 10.6 MG/DL
CHLORIDE SERPL-SCNC: 105 MMOL/L
CO2 SERPL-SCNC: 24 MMOL/L
CREAT SERPL-MCNC: 0.82 MG/DL
GLUCOSE SERPL-MCNC: 94 MG/DL
LDH SERPL-CCNC: 150 U/L
MAGNESIUM SERPL-MCNC: 2.2 MG/DL
POTASSIUM SERPL-SCNC: 5 MMOL/L
PROT SERPL-MCNC: 5.4 G/DL
SODIUM SERPL-SCNC: 141 MMOL/L

## 2017-03-31 NOTE — PROVIDER CONTACT NOTE (CRITICAL VALUE NOTIFICATION) - ASSESSMENT
[address] [address]    Phone:  [phone]       Thank You for choosing us for your health care visit. We are glad to serve you and happy to provide you with this summary of your visit. Please help us to ensure we have accurate records. If you find anything that needs to be changed, please let our staff know as soon as possible.          Your Demographic Information     Patient Name Sex Ya Oneal Female 1991       Ethnic Group Patient Race    Not of  or  Origin White      Your Visit Details     Date & Time Provider Department    3/31/2017 3:15 PM Tricia Nagel DO [facility]      Your Upcoming Appointment*(Max 10)     Thursday May 04, 2017  8:45 AM CDT   Fasting Lab with [facility] LAB   [facility] ([facility])    [address] [place] [id number]   [phone]            Wednesday May 10, 2017  3:00 PM CDT   Follow-up Visit with Tricia Nagel DO   [facility] ([facility])    [address] [place] [address]   [phone]              We Ordered or Performed the Following     SERVICE TO BEHAVIORAL HEALTH       Conditions Discussed Today or Order-Related Diagnoses        Comments    Anxiety    -  Primary     Nausea           Your Vitals Were     BP Pulse Temp Resp Height Weight    126/86 80 98.9 °F (37.2 °C) 16 5' 3\" (1.6 m) 236 lb (107 kg)    LMP BMI Smoking Status             2017 (Exact Date) 41.81 kg/m2 Former Smoker         Medications Prescribed or Re-Ordered Today     sertraline (ZOLOFT) 50 MG tablet    Sig - Route: Take 1 tablet by mouth daily. - Oral    Class: Eprescribe    Pharmacy: [facility] [id number] [place] [address] - [address] AT [facility] [address] Ph #: [phone]    ondansetron (ZOFRAN ODT) 4 MG disintegrating tablet    Sig  - Route: Take 1 tablet by mouth every 8 hours as needed for Nausea. - Oral    Class: Eprescribe    Pharmacy: [facility] [address] [place] [address] [address] AT [place] [address] Ph #: [phone]    hydrOXYzine (ATARAX) 25 MG tablet    Sig - Route: Take 1 tablet by mouth every 8 hours as needed for Anxiety. - Oral    Class: Eprescribe    Pharmacy: [facility] [address] [place] [address] [address] AT [place] [address] Ph #: [phone]      Your Current Medications Are        Disp Refills Start End    ranitidine (ZANTAC) 150 MG tablet 60 tablet 5 3/10/2017     Sig - Route: Take 1 tablet by mouth 2 times daily. - Oral    Class: Eprescribe    TRAMADOL HCL PO        Sig - Route: Thru pain management - Oral    Class: Historical Med    lidocaine (LIDODERM) 5 % 30 patch 1 7/22/2016     Sig - Route: Place 1 patch onto the skin every 12 hours. Remove patch 12 hours after applying. Apply to low back. Need to keep March 23rd appointment. - Transdermal    Class: Eprescribe    sertraline (ZOLOFT) 50 MG tablet 30 tablet 2 3/31/2017     Sig - Route: Take 1 tablet by mouth daily. - Oral    Class: Eprescribe    ondansetron (ZOFRAN ODT) 4 MG disintegrating tablet 30 tablet 2 3/31/2017     Sig - Route: Take 1 tablet by mouth every 8 hours as needed for Nausea. - Oral    Class: Eprescribe    hydrOXYzine (ATARAX) 25 MG tablet 30 tablet 2 3/31/2017     Sig - Route: Take 1 tablet by mouth every 8 hours as needed for Anxiety. - Oral    Class: Eprescribe    Spacer/Aero-Holding Chambers (AEROCHAMBER) 1 each 0 8/12/2016     Sig - Route: Inhale 1 Units into the lungs every 4 hours as needed. - Inhalation    Class: Eprescribe      Discontinued Medications        Reason for Discontinue    albuterol (PROAIR HFA) 108 (90 BASE) MCG/ACT inhaler Therapy Completed    phentermine (ADIPEX-P) 37.5 MG tablet Patient Declined      Allergies     Trazodone Muscle Spasm    MYALGIAS      Immunizations History as  of 3/31/2017     Name Date    HPV 9-VALENT 7/22/2016    HPV QUAD 6/16/2015  2:36 PM, 4/21/2015    INFLUENZA QUADRIVALENT 10/28/2014      Problem List as of 3/31/2017     Irregular periods    GERD (gastroesophageal reflux disease)    Low back pain    Chronic back pain    Myofascial pain              Patient Instructions    Laughter yoga        asymptomatic

## 2017-04-07 ENCOUNTER — OUTPATIENT (OUTPATIENT)
Dept: OUTPATIENT SERVICES | Facility: HOSPITAL | Age: 57
LOS: 1 days | Discharge: HOME | End: 2017-04-07

## 2017-04-07 DIAGNOSIS — Z41.9 ENCOUNTER FOR PROCEDURE FOR PURPOSES OTHER THAN REMEDYING HEALTH STATE, UNSPECIFIED: Chronic | ICD-10-CM

## 2017-04-07 DIAGNOSIS — Z98.89 OTHER SPECIFIED POSTPROCEDURAL STATES: Chronic | ICD-10-CM

## 2017-04-07 DIAGNOSIS — C91.00 ACUTE LYMPHOBLASTIC LEUKEMIA NOT HAVING ACHIEVED REMISSION: ICD-10-CM

## 2017-04-10 ENCOUNTER — RESULT REVIEW (OUTPATIENT)
Age: 57
End: 2017-04-10

## 2017-04-11 ENCOUNTER — LABORATORY RESULT (OUTPATIENT)
Age: 57
End: 2017-04-11

## 2017-04-11 ENCOUNTER — OUTPATIENT (OUTPATIENT)
Dept: OUTPATIENT SERVICES | Facility: HOSPITAL | Age: 57
LOS: 1 days | End: 2017-04-11
Payer: COMMERCIAL

## 2017-04-11 ENCOUNTER — APPOINTMENT (OUTPATIENT)
Dept: HEMATOLOGY ONCOLOGY | Facility: CLINIC | Age: 57
End: 2017-04-11

## 2017-04-11 VITALS
RESPIRATION RATE: 16 BRPM | OXYGEN SATURATION: 100 % | HEART RATE: 75 BPM | SYSTOLIC BLOOD PRESSURE: 138 MMHG | DIASTOLIC BLOOD PRESSURE: 87 MMHG | WEIGHT: 212.72 LBS | BODY MASS INDEX: 37.93 KG/M2 | TEMPERATURE: 98 F

## 2017-04-11 DIAGNOSIS — Z98.89 OTHER SPECIFIED POSTPROCEDURAL STATES: Chronic | ICD-10-CM

## 2017-04-11 DIAGNOSIS — C91.00 ACUTE LYMPHOBLASTIC LEUKEMIA NOT HAVING ACHIEVED REMISSION: ICD-10-CM

## 2017-04-11 DIAGNOSIS — Z41.9 ENCOUNTER FOR PROCEDURE FOR PURPOSES OTHER THAN REMEDYING HEALTH STATE, UNSPECIFIED: Chronic | ICD-10-CM

## 2017-04-11 LAB
BASOPHILS # BLD AUTO: 0.1 K/UL — SIGNIFICANT CHANGE UP (ref 0–0.2)
BASOPHILS NFR BLD AUTO: 0.8 % — SIGNIFICANT CHANGE UP (ref 0–2)
EOSINOPHIL # BLD AUTO: 0.2 K/UL — SIGNIFICANT CHANGE UP (ref 0–0.5)
EOSINOPHIL NFR BLD AUTO: 2.2 % — SIGNIFICANT CHANGE UP (ref 0–6)
HCT VFR BLD CALC: 34.8 % — SIGNIFICANT CHANGE UP (ref 34.5–45)
HGB BLD-MCNC: 11.6 G/DL — SIGNIFICANT CHANGE UP (ref 11.5–15.5)
LYMPHOCYTES # BLD AUTO: 4.1 K/UL — HIGH (ref 1–3.3)
LYMPHOCYTES # BLD AUTO: 48.1 % — HIGH (ref 13–44)
MCHC RBC-ENTMCNC: 33.4 G/DL — SIGNIFICANT CHANGE UP (ref 32–36)
MCHC RBC-ENTMCNC: 36.6 PG — HIGH (ref 27–34)
MCV RBC AUTO: 109 FL — HIGH (ref 80–100)
MONOCYTES # BLD AUTO: 0.6 K/UL — SIGNIFICANT CHANGE UP (ref 0–0.9)
MONOCYTES NFR BLD AUTO: 7 % — SIGNIFICANT CHANGE UP (ref 2–14)
NEUTROPHILS # BLD AUTO: 3.6 K/UL — SIGNIFICANT CHANGE UP (ref 1.8–7.4)
NEUTROPHILS NFR BLD AUTO: 41.9 % — LOW (ref 43–77)
PLATELET # BLD AUTO: 115 K/UL — LOW (ref 150–400)
RBC # BLD: 3.18 M/UL — LOW (ref 3.8–5.2)
RBC # FLD: 13.1 % — SIGNIFICANT CHANGE UP (ref 10.3–14.5)
WBC # BLD: 8.5 K/UL — SIGNIFICANT CHANGE UP (ref 3.8–10.5)
WBC # FLD AUTO: 8.5 K/UL — SIGNIFICANT CHANGE UP (ref 3.8–10.5)

## 2017-04-11 PROCEDURE — 88271 CYTOGENETICS DNA PROBE: CPT

## 2017-04-11 PROCEDURE — 88237 TISSUE CULTURE BONE MARROW: CPT

## 2017-04-11 PROCEDURE — G0452: CPT | Mod: 26

## 2017-04-11 PROCEDURE — 81206 BCR/ABL1 GENE MAJOR BP: CPT

## 2017-04-11 PROCEDURE — 88291 CYTO/MOLECULAR REPORT: CPT

## 2017-04-11 PROCEDURE — 88275 CYTOGENETICS 100-300: CPT

## 2017-04-13 LAB — BCR/ABL BY RT - PCR QUANTITATIVE: SIGNIFICANT CHANGE UP

## 2017-04-14 ENCOUNTER — RX RENEWAL (OUTPATIENT)
Age: 57
End: 2017-04-14

## 2017-04-17 LAB — CHROM ANALY INTERPHASE BLD FISH-IMP: SIGNIFICANT CHANGE UP

## 2017-04-24 LAB — CHROM ANALY OVERALL INTERP SPEC-IMP: SIGNIFICANT CHANGE UP

## 2017-05-10 ENCOUNTER — RX RENEWAL (OUTPATIENT)
Age: 57
End: 2017-05-10

## 2017-05-26 ENCOUNTER — RX RENEWAL (OUTPATIENT)
Age: 57
End: 2017-05-26

## 2017-05-31 ENCOUNTER — MEDICATION RENEWAL (OUTPATIENT)
Age: 57
End: 2017-05-31

## 2017-06-06 ENCOUNTER — OUTPATIENT (OUTPATIENT)
Dept: OUTPATIENT SERVICES | Facility: HOSPITAL | Age: 57
LOS: 1 days | Discharge: HOME | End: 2017-06-06

## 2017-06-06 DIAGNOSIS — Z98.89 OTHER SPECIFIED POSTPROCEDURAL STATES: Chronic | ICD-10-CM

## 2017-06-06 DIAGNOSIS — C91.00 ACUTE LYMPHOBLASTIC LEUKEMIA NOT HAVING ACHIEVED REMISSION: ICD-10-CM

## 2017-06-06 DIAGNOSIS — Z41.9 ENCOUNTER FOR PROCEDURE FOR PURPOSES OTHER THAN REMEDYING HEALTH STATE, UNSPECIFIED: Chronic | ICD-10-CM

## 2017-07-06 ENCOUNTER — RX RENEWAL (OUTPATIENT)
Age: 57
End: 2017-07-06

## 2017-07-11 ENCOUNTER — OUTPATIENT (OUTPATIENT)
Dept: OUTPATIENT SERVICES | Facility: HOSPITAL | Age: 57
LOS: 1 days | Discharge: HOME | End: 2017-07-11

## 2017-07-11 ENCOUNTER — RX RENEWAL (OUTPATIENT)
Age: 57
End: 2017-07-11

## 2017-07-11 DIAGNOSIS — C91.00 ACUTE LYMPHOBLASTIC LEUKEMIA NOT HAVING ACHIEVED REMISSION: ICD-10-CM

## 2017-07-11 DIAGNOSIS — Z41.9 ENCOUNTER FOR PROCEDURE FOR PURPOSES OTHER THAN REMEDYING HEALTH STATE, UNSPECIFIED: Chronic | ICD-10-CM

## 2017-07-11 DIAGNOSIS — Z98.89 OTHER SPECIFIED POSTPROCEDURAL STATES: Chronic | ICD-10-CM

## 2017-07-13 ENCOUNTER — CHART COPY (OUTPATIENT)
Age: 57
End: 2017-07-13

## 2017-07-13 ENCOUNTER — APPOINTMENT (OUTPATIENT)
Dept: HEMATOLOGY ONCOLOGY | Facility: CLINIC | Age: 57
End: 2017-07-13

## 2017-07-13 ENCOUNTER — RESULT REVIEW (OUTPATIENT)
Age: 57
End: 2017-07-13

## 2017-07-13 VITALS
WEIGHT: 227.52 LBS | SYSTOLIC BLOOD PRESSURE: 133 MMHG | OXYGEN SATURATION: 100 % | HEIGHT: 62.8 IN | HEART RATE: 73 BPM | RESPIRATION RATE: 16 BRPM | TEMPERATURE: 98 F | BODY MASS INDEX: 40.31 KG/M2 | DIASTOLIC BLOOD PRESSURE: 86 MMHG

## 2017-07-13 LAB
BASOPHILS # BLD AUTO: 0 K/UL — SIGNIFICANT CHANGE UP (ref 0–0.2)
BASOPHILS NFR BLD AUTO: 0.5 % — SIGNIFICANT CHANGE UP (ref 0–2)
EOSINOPHIL # BLD AUTO: 0.1 K/UL — SIGNIFICANT CHANGE UP (ref 0–0.5)
EOSINOPHIL NFR BLD AUTO: 2.6 % — SIGNIFICANT CHANGE UP (ref 0–6)
HCT VFR BLD CALC: 38.8 % — SIGNIFICANT CHANGE UP (ref 34.5–45)
HGB BLD-MCNC: 13.5 G/DL — SIGNIFICANT CHANGE UP (ref 11.5–15.5)
LYMPHOCYTES # BLD AUTO: 1.8 K/UL — SIGNIFICANT CHANGE UP (ref 1–3.3)
LYMPHOCYTES # BLD AUTO: 35.3 % — SIGNIFICANT CHANGE UP (ref 13–44)
MCHC RBC-ENTMCNC: 34.7 G/DL — SIGNIFICANT CHANGE UP (ref 32–36)
MCHC RBC-ENTMCNC: 37.3 PG — HIGH (ref 27–34)
MCV RBC AUTO: 108 FL — HIGH (ref 80–100)
MONOCYTES # BLD AUTO: 0.5 K/UL — SIGNIFICANT CHANGE UP (ref 0–0.9)
MONOCYTES NFR BLD AUTO: 8.7 % — SIGNIFICANT CHANGE UP (ref 2–14)
NEUTROPHILS # BLD AUTO: 2.8 K/UL — SIGNIFICANT CHANGE UP (ref 1.8–7.4)
NEUTROPHILS NFR BLD AUTO: 52.9 % — SIGNIFICANT CHANGE UP (ref 43–77)
PLATELET # BLD AUTO: 89 K/UL — LOW (ref 150–400)
RBC # BLD: 3.6 M/UL — LOW (ref 3.8–5.2)
RBC # FLD: 11.3 % — SIGNIFICANT CHANGE UP (ref 10.3–14.5)
WBC # BLD: 5.2 K/UL — SIGNIFICANT CHANGE UP (ref 3.8–10.5)
WBC # FLD AUTO: 5.2 K/UL — SIGNIFICANT CHANGE UP (ref 3.8–10.5)

## 2017-07-13 RX ORDER — VORICONAZOLE 200 MG/1
200 TABLET ORAL DAILY
Qty: 15 | Refills: 3 | Status: DISCONTINUED | COMMUNITY
End: 2017-07-13

## 2017-07-17 LAB
ALBUMIN SERPL ELPH-MCNC: 4.3 G/DL
ALP BLD-CCNC: 151 U/L
ALT SERPL-CCNC: 23 U/L
ANION GAP SERPL CALC-SCNC: 16 MMOL/L
AST SERPL-CCNC: 11 U/L
BILIRUB SERPL-MCNC: 0.4 MG/DL
BUN SERPL-MCNC: 24 MG/DL
CALCIUM SERPL-MCNC: 10.8 MG/DL
CHLORIDE SERPL-SCNC: 107 MMOL/L
CO2 SERPL-SCNC: 21 MMOL/L
CREAT SERPL-MCNC: 0.81 MG/DL
GLUCOSE SERPL-MCNC: 94 MG/DL
LDH SERPL-CCNC: 176 U/L
MAGNESIUM SERPL-MCNC: 2.2 MG/DL
POTASSIUM SERPL-SCNC: 4.4 MMOL/L
PROT SERPL-MCNC: 6 G/DL
SODIUM SERPL-SCNC: 144 MMOL/L

## 2017-08-10 ENCOUNTER — RX RENEWAL (OUTPATIENT)
Age: 57
End: 2017-08-10

## 2017-08-14 ENCOUNTER — RX RENEWAL (OUTPATIENT)
Age: 57
End: 2017-08-14

## 2017-08-31 ENCOUNTER — OUTPATIENT (OUTPATIENT)
Dept: OUTPATIENT SERVICES | Facility: HOSPITAL | Age: 57
LOS: 1 days | Discharge: HOME | End: 2017-08-31

## 2017-08-31 DIAGNOSIS — Z98.89 OTHER SPECIFIED POSTPROCEDURAL STATES: Chronic | ICD-10-CM

## 2017-08-31 DIAGNOSIS — C91.00 ACUTE LYMPHOBLASTIC LEUKEMIA NOT HAVING ACHIEVED REMISSION: ICD-10-CM

## 2017-08-31 DIAGNOSIS — Z41.9 ENCOUNTER FOR PROCEDURE FOR PURPOSES OTHER THAN REMEDYING HEALTH STATE, UNSPECIFIED: Chronic | ICD-10-CM

## 2017-09-06 ENCOUNTER — OUTPATIENT (OUTPATIENT)
Dept: OUTPATIENT SERVICES | Facility: HOSPITAL | Age: 57
LOS: 1 days | End: 2017-09-06
Payer: COMMERCIAL

## 2017-09-06 DIAGNOSIS — Z98.89 OTHER SPECIFIED POSTPROCEDURAL STATES: Chronic | ICD-10-CM

## 2017-09-06 DIAGNOSIS — C91.00 ACUTE LYMPHOBLASTIC LEUKEMIA NOT HAVING ACHIEVED REMISSION: ICD-10-CM

## 2017-09-06 DIAGNOSIS — Z41.9 ENCOUNTER FOR PROCEDURE FOR PURPOSES OTHER THAN REMEDYING HEALTH STATE, UNSPECIFIED: Chronic | ICD-10-CM

## 2017-09-07 ENCOUNTER — RESULT REVIEW (OUTPATIENT)
Age: 57
End: 2017-09-07

## 2017-09-07 ENCOUNTER — APPOINTMENT (OUTPATIENT)
Dept: HEMATOLOGY ONCOLOGY | Facility: CLINIC | Age: 57
End: 2017-09-07
Payer: MEDICAID

## 2017-09-07 ENCOUNTER — APPOINTMENT (OUTPATIENT)
Dept: INFUSION THERAPY | Facility: HOSPITAL | Age: 57
End: 2017-09-07

## 2017-09-07 DIAGNOSIS — N28.1 CYST OF KIDNEY, ACQUIRED: ICD-10-CM

## 2017-09-07 LAB
BASOPHILS # BLD AUTO: 0.1 K/UL — SIGNIFICANT CHANGE UP (ref 0–0.2)
BASOPHILS NFR BLD AUTO: 0.8 % — SIGNIFICANT CHANGE UP (ref 0–2)
EOSINOPHIL # BLD AUTO: 0.2 K/UL — SIGNIFICANT CHANGE UP (ref 0–0.5)
EOSINOPHIL NFR BLD AUTO: 2.5 % — SIGNIFICANT CHANGE UP (ref 0–6)
HCT VFR BLD CALC: 36.8 % — SIGNIFICANT CHANGE UP (ref 34.5–45)
HGB BLD-MCNC: 12.7 G/DL — SIGNIFICANT CHANGE UP (ref 11.5–15.5)
LYMPHOCYTES # BLD AUTO: 3.7 K/UL — HIGH (ref 1–3.3)
LYMPHOCYTES # BLD AUTO: 51.6 % — HIGH (ref 13–44)
MCHC RBC-ENTMCNC: 34.6 G/DL — SIGNIFICANT CHANGE UP (ref 32–36)
MCHC RBC-ENTMCNC: 37.4 PG — HIGH (ref 27–34)
MCV RBC AUTO: 108 FL — HIGH (ref 80–100)
MONOCYTES # BLD AUTO: 0.5 K/UL — SIGNIFICANT CHANGE UP (ref 0–0.9)
MONOCYTES NFR BLD AUTO: 6.4 % — SIGNIFICANT CHANGE UP (ref 2–14)
NEUTROPHILS # BLD AUTO: 2.8 K/UL — SIGNIFICANT CHANGE UP (ref 1.8–7.4)
NEUTROPHILS NFR BLD AUTO: 38.7 % — LOW (ref 43–77)
PLATELET # BLD AUTO: 126 K/UL — LOW (ref 150–400)
RBC # BLD: 3.41 M/UL — LOW (ref 3.8–5.2)
RBC # FLD: 12.1 % — SIGNIFICANT CHANGE UP (ref 10.3–14.5)
WBC # BLD: 7.1 K/UL — SIGNIFICANT CHANGE UP (ref 3.8–10.5)
WBC # FLD AUTO: 7.1 K/UL — SIGNIFICANT CHANGE UP (ref 3.8–10.5)

## 2017-09-07 PROCEDURE — 85097 BONE MARROW INTERPRETATION: CPT

## 2017-09-07 PROCEDURE — 88185 FLOWCYTOMETRY/TC ADD-ON: CPT

## 2017-09-07 PROCEDURE — 88264 CHROMOSOME ANALYSIS 20-25: CPT

## 2017-09-07 PROCEDURE — 88291 CYTO/MOLECULAR REPORT: CPT | Mod: 59

## 2017-09-07 PROCEDURE — 99215 OFFICE O/P EST HI 40 MIN: CPT | Mod: 25

## 2017-09-07 PROCEDURE — 88189 FLOWCYTOMETRY/READ 16 & >: CPT

## 2017-09-07 PROCEDURE — 88280 CHROMOSOME KARYOTYPE STUDY: CPT

## 2017-09-07 PROCEDURE — 88237 TISSUE CULTURE BONE MARROW: CPT

## 2017-09-07 PROCEDURE — 88271 CYTOGENETICS DNA PROBE: CPT

## 2017-09-07 PROCEDURE — 81206 BCR/ABL1 GENE MAJOR BP: CPT

## 2017-09-07 PROCEDURE — G0452: CPT | Mod: 26

## 2017-09-07 PROCEDURE — 88184 FLOWCYTOMETRY/ TC 1 MARKER: CPT

## 2017-09-07 PROCEDURE — 88305 TISSUE EXAM BY PATHOLOGIST: CPT | Mod: 26

## 2017-09-07 PROCEDURE — 88275 CYTOGENETICS 100-300: CPT

## 2017-09-07 PROCEDURE — 88313 SPECIAL STAINS GROUP 2: CPT

## 2017-09-07 PROCEDURE — 88313 SPECIAL STAINS GROUP 2: CPT | Mod: 26

## 2017-09-07 PROCEDURE — 38221 DX BONE MARROW BIOPSIES: CPT | Mod: 59

## 2017-09-07 PROCEDURE — 88305 TISSUE EXAM BY PATHOLOGIST: CPT

## 2017-09-07 PROCEDURE — G0364: CPT | Mod: 59

## 2017-09-08 ENCOUNTER — RESULT REVIEW (OUTPATIENT)
Age: 57
End: 2017-09-08

## 2017-09-08 DIAGNOSIS — R52 PAIN, UNSPECIFIED: ICD-10-CM

## 2017-09-08 DIAGNOSIS — R11.2 NAUSEA WITH VOMITING, UNSPECIFIED: ICD-10-CM

## 2017-09-08 LAB
ALBUMIN SERPL ELPH-MCNC: 4.3 G/DL
ALP BLD-CCNC: 135 U/L
ALT SERPL-CCNC: 19 U/L
ANION GAP SERPL CALC-SCNC: 11 MMOL/L
AST SERPL-CCNC: 12 U/L
BILIRUB SERPL-MCNC: 0.5 MG/DL
BUN SERPL-MCNC: 14 MG/DL
CALCIUM SERPL-MCNC: 10 MG/DL
CHLORIDE SERPL-SCNC: 105 MMOL/L
CO2 SERPL-SCNC: 24 MMOL/L
CREAT SERPL-MCNC: 0.86 MG/DL
GLUCOSE SERPL-MCNC: 96 MG/DL
LDH SERPL-CCNC: 208 U/L
MAGNESIUM SERPL-MCNC: 2.2 MG/DL
POTASSIUM SERPL-SCNC: 4 MMOL/L
PROT SERPL-MCNC: 6.1 G/DL
SODIUM SERPL-SCNC: 140 MMOL/L

## 2017-09-11 ENCOUNTER — RESULT REVIEW (OUTPATIENT)
Age: 57
End: 2017-09-11

## 2017-09-12 LAB — CHROM ANALY INTERPHASE BLD FISH-IMP: SIGNIFICANT CHANGE UP

## 2017-09-13 LAB
BCR/ABL BY RT - PCR QUANTITATIVE: SIGNIFICANT CHANGE UP
BCR/ABL BY RT - PCR QUANTITATIVE: SIGNIFICANT CHANGE UP
HEMATOPATHOLOGY REPORT: SIGNIFICANT CHANGE UP

## 2017-09-15 LAB — TM INTERPRETATION: SIGNIFICANT CHANGE UP

## 2017-09-18 ENCOUNTER — RX RENEWAL (OUTPATIENT)
Age: 57
End: 2017-09-18

## 2017-09-18 LAB — CHROM ANALY OVERALL INTERP SPEC-IMP: SIGNIFICANT CHANGE UP

## 2017-10-10 ENCOUNTER — MEDICATION RENEWAL (OUTPATIENT)
Age: 57
End: 2017-10-10

## 2017-10-19 ENCOUNTER — RX RENEWAL (OUTPATIENT)
Age: 57
End: 2017-10-19

## 2017-11-08 ENCOUNTER — RX RENEWAL (OUTPATIENT)
Age: 57
End: 2017-11-08

## 2017-11-15 ENCOUNTER — RX RENEWAL (OUTPATIENT)
Age: 57
End: 2017-11-15

## 2017-12-06 ENCOUNTER — OUTPATIENT (OUTPATIENT)
Dept: OUTPATIENT SERVICES | Facility: HOSPITAL | Age: 57
LOS: 1 days | Discharge: HOME | End: 2017-12-06

## 2017-12-06 DIAGNOSIS — Z98.89 OTHER SPECIFIED POSTPROCEDURAL STATES: Chronic | ICD-10-CM

## 2017-12-06 DIAGNOSIS — Z41.9 ENCOUNTER FOR PROCEDURE FOR PURPOSES OTHER THAN REMEDYING HEALTH STATE, UNSPECIFIED: Chronic | ICD-10-CM

## 2017-12-06 DIAGNOSIS — C91.00 ACUTE LYMPHOBLASTIC LEUKEMIA NOT HAVING ACHIEVED REMISSION: ICD-10-CM

## 2017-12-12 ENCOUNTER — RESULT REVIEW (OUTPATIENT)
Age: 57
End: 2017-12-12

## 2017-12-12 ENCOUNTER — APPOINTMENT (OUTPATIENT)
Dept: HEMATOLOGY ONCOLOGY | Facility: CLINIC | Age: 57
End: 2017-12-12
Payer: MEDICAID

## 2017-12-12 ENCOUNTER — LABORATORY RESULT (OUTPATIENT)
Age: 57
End: 2017-12-12

## 2017-12-12 ENCOUNTER — OUTPATIENT (OUTPATIENT)
Dept: OUTPATIENT SERVICES | Facility: HOSPITAL | Age: 57
LOS: 1 days | End: 2017-12-12
Payer: COMMERCIAL

## 2017-12-12 VITALS
BODY MASS INDEX: 43.47 KG/M2 | OXYGEN SATURATION: 99 % | RESPIRATION RATE: 16 BRPM | HEART RATE: 70 BPM | TEMPERATURE: 98.2 F | DIASTOLIC BLOOD PRESSURE: 98 MMHG | SYSTOLIC BLOOD PRESSURE: 144 MMHG | WEIGHT: 243.83 LBS

## 2017-12-12 DIAGNOSIS — Z41.9 ENCOUNTER FOR PROCEDURE FOR PURPOSES OTHER THAN REMEDYING HEALTH STATE, UNSPECIFIED: Chronic | ICD-10-CM

## 2017-12-12 DIAGNOSIS — C91.00 ACUTE LYMPHOBLASTIC LEUKEMIA NOT HAVING ACHIEVED REMISSION: ICD-10-CM

## 2017-12-12 DIAGNOSIS — Z98.89 OTHER SPECIFIED POSTPROCEDURAL STATES: Chronic | ICD-10-CM

## 2017-12-12 LAB
ALBUMIN SERPL ELPH-MCNC: 3.9 G/DL
ALP BLD-CCNC: 128 U/L
ALT SERPL-CCNC: 22 U/L
ANION GAP SERPL CALC-SCNC: 16 MMOL/L
AST SERPL-CCNC: 15 U/L
BASOPHILS # BLD AUTO: 0.1 K/UL — SIGNIFICANT CHANGE UP (ref 0–0.2)
BASOPHILS NFR BLD AUTO: 1 % — SIGNIFICANT CHANGE UP (ref 0–2)
BILIRUB SERPL-MCNC: 0.4 MG/DL
BUN SERPL-MCNC: 14 MG/DL
CALCIUM SERPL-MCNC: 9.8 MG/DL
CHLORIDE SERPL-SCNC: 106 MMOL/L
CHOLEST SERPL-MCNC: 216 MG/DL
CHOLEST/HDLC SERPL: 3.7 RATIO
CO2 SERPL-SCNC: 19 MMOL/L
CREAT SERPL-MCNC: 0.86 MG/DL
EOSINOPHIL # BLD AUTO: 0.1 K/UL — SIGNIFICANT CHANGE UP (ref 0–0.5)
EOSINOPHIL NFR BLD AUTO: 1.6 % — SIGNIFICANT CHANGE UP (ref 0–6)
FERRITIN SERPL-MCNC: 608 NG/ML
GLUCOSE SERPL-MCNC: 99 MG/DL
HCT VFR BLD CALC: 36.8 % — SIGNIFICANT CHANGE UP (ref 34.5–45)
HDLC SERPL-MCNC: 58 MG/DL
HGB BLD-MCNC: 12.6 G/DL — SIGNIFICANT CHANGE UP (ref 11.5–15.5)
LDH SERPL-CCNC: 254 U/L
LDLC SERPL CALC-MCNC: 130 MG/DL
LYMPHOCYTES # BLD AUTO: 2.5 K/UL — SIGNIFICANT CHANGE UP (ref 1–3.3)
LYMPHOCYTES # BLD AUTO: 31.8 % — SIGNIFICANT CHANGE UP (ref 13–44)
MAGNESIUM SERPL-MCNC: 2.2 MG/DL
MCHC RBC-ENTMCNC: 34.3 G/DL — SIGNIFICANT CHANGE UP (ref 32–36)
MCHC RBC-ENTMCNC: 38.1 PG — HIGH (ref 27–34)
MCV RBC AUTO: 111 FL — HIGH (ref 80–100)
MONOCYTES # BLD AUTO: 0.4 K/UL — SIGNIFICANT CHANGE UP (ref 0–0.9)
MONOCYTES NFR BLD AUTO: 5.7 % — SIGNIFICANT CHANGE UP (ref 2–14)
NEUTROPHILS # BLD AUTO: 4.7 K/UL — SIGNIFICANT CHANGE UP (ref 1.8–7.4)
NEUTROPHILS NFR BLD AUTO: 60 % — SIGNIFICANT CHANGE UP (ref 43–77)
PLATELET # BLD AUTO: 101 K/UL — LOW (ref 150–400)
POTASSIUM SERPL-SCNC: 5.1 MMOL/L
PROT SERPL-MCNC: 5.9 G/DL
RBC # BLD: 3.31 M/UL — LOW (ref 3.8–5.2)
RBC # FLD: 11.2 % — SIGNIFICANT CHANGE UP (ref 10.3–14.5)
SODIUM SERPL-SCNC: 141 MMOL/L
T3RU NFR SERPL: 1.16 INDEX
T4 SERPL-MCNC: 7.3 UG/DL
TRIGL SERPL-MCNC: 139 MG/DL
TSH SERPL-ACNC: 2.03 UIU/ML
WBC # BLD: 7.9 K/UL — SIGNIFICANT CHANGE UP (ref 3.8–10.5)
WBC # FLD AUTO: 7.9 K/UL — SIGNIFICANT CHANGE UP (ref 3.8–10.5)

## 2017-12-12 PROCEDURE — G0452: CPT | Mod: 26

## 2017-12-12 PROCEDURE — 99214 OFFICE O/P EST MOD 30 MIN: CPT

## 2017-12-12 PROCEDURE — 81206 BCR/ABL1 GENE MAJOR BP: CPT

## 2017-12-18 LAB — BCR/ABL BY RT - PCR QUANTITATIVE: SIGNIFICANT CHANGE UP

## 2017-12-21 ENCOUNTER — MEDICATION RENEWAL (OUTPATIENT)
Age: 57
End: 2017-12-21

## 2018-01-23 ENCOUNTER — MEDICATION RENEWAL (OUTPATIENT)
Age: 58
End: 2018-01-23

## 2018-01-25 ENCOUNTER — APPOINTMENT (OUTPATIENT)
Dept: CV DIAGNOSITCS | Facility: HOSPITAL | Age: 58
End: 2018-01-25

## 2018-01-25 ENCOUNTER — OUTPATIENT (OUTPATIENT)
Dept: OUTPATIENT SERVICES | Facility: HOSPITAL | Age: 58
LOS: 1 days | End: 2018-01-25
Payer: COMMERCIAL

## 2018-01-25 DIAGNOSIS — Z94.84 STEM CELLS TRANSPLANT STATUS: ICD-10-CM

## 2018-01-25 DIAGNOSIS — Z98.89 OTHER SPECIFIED POSTPROCEDURAL STATES: Chronic | ICD-10-CM

## 2018-01-25 DIAGNOSIS — Z41.9 ENCOUNTER FOR PROCEDURE FOR PURPOSES OTHER THAN REMEDYING HEALTH STATE, UNSPECIFIED: Chronic | ICD-10-CM

## 2018-01-25 PROCEDURE — 93306 TTE W/DOPPLER COMPLETE: CPT

## 2018-01-25 PROCEDURE — 93306 TTE W/DOPPLER COMPLETE: CPT | Mod: 26

## 2018-02-02 ENCOUNTER — OUTPATIENT (OUTPATIENT)
Dept: OUTPATIENT SERVICES | Facility: HOSPITAL | Age: 58
LOS: 1 days | Discharge: HOME | End: 2018-02-02

## 2018-02-02 DIAGNOSIS — Z41.9 ENCOUNTER FOR PROCEDURE FOR PURPOSES OTHER THAN REMEDYING HEALTH STATE, UNSPECIFIED: Chronic | ICD-10-CM

## 2018-02-02 DIAGNOSIS — Z98.89 OTHER SPECIFIED POSTPROCEDURAL STATES: Chronic | ICD-10-CM

## 2018-02-02 DIAGNOSIS — C91.00 ACUTE LYMPHOBLASTIC LEUKEMIA NOT HAVING ACHIEVED REMISSION: ICD-10-CM

## 2018-02-06 NOTE — PATIENT PROFILE ADULT. - PRO MENTAL HEALTH SX RECENT
Colchicine Counseling:  Patient counseled regarding adverse effects including but not limited to stomach upset (nausea, vomiting, stomach pain, or diarrhea). Patient instructed to limit alcohol consumption while taking this medication. Colchicine may reduce blood counts especially with prolonged use. The patient understands that monitoring of kidney function and blood counts may be required, especially at baseline. The patient verbalized understanding of the proper use and possible adverse effects of colchicine. All of the patient's questions and concerns were addressed. none

## 2018-02-08 ENCOUNTER — APPOINTMENT (OUTPATIENT)
Dept: INFUSION THERAPY | Facility: HOSPITAL | Age: 58
End: 2018-02-08
Payer: MEDICAID

## 2018-02-08 PROCEDURE — 90670 PCV13 VACCINE IM: CPT

## 2018-02-08 PROCEDURE — G0009: CPT

## 2018-02-08 PROCEDURE — 90472 IMMUNIZATION ADMIN EACH ADD: CPT

## 2018-02-08 PROCEDURE — 90647 HIB PRP-OMP VACC 3 DOSE IM: CPT

## 2018-02-09 ENCOUNTER — MEDICATION RENEWAL (OUTPATIENT)
Age: 58
End: 2018-02-09

## 2018-02-15 ENCOUNTER — APPOINTMENT (OUTPATIENT)
Dept: HEMATOLOGY ONCOLOGY | Facility: CLINIC | Age: 58
End: 2018-02-15
Payer: MEDICAID

## 2018-02-15 ENCOUNTER — APPOINTMENT (OUTPATIENT)
Dept: INFUSION THERAPY | Facility: HOSPITAL | Age: 58
End: 2018-02-15
Payer: MEDICAID

## 2018-02-15 ENCOUNTER — RESULT REVIEW (OUTPATIENT)
Age: 58
End: 2018-02-15

## 2018-02-15 ENCOUNTER — OUTPATIENT (OUTPATIENT)
Dept: OUTPATIENT SERVICES | Facility: HOSPITAL | Age: 58
LOS: 1 days | End: 2018-02-15
Payer: COMMERCIAL

## 2018-02-15 VITALS
TEMPERATURE: 98.1 F | SYSTOLIC BLOOD PRESSURE: 131 MMHG | BODY MASS INDEX: 45.11 KG/M2 | HEART RATE: 85 BPM | WEIGHT: 253 LBS | RESPIRATION RATE: 16 BRPM | DIASTOLIC BLOOD PRESSURE: 84 MMHG | OXYGEN SATURATION: 100 %

## 2018-02-15 DIAGNOSIS — Z87.898 PERSONAL HISTORY OF OTHER SPECIFIED CONDITIONS: ICD-10-CM

## 2018-02-15 DIAGNOSIS — Z98.89 OTHER SPECIFIED POSTPROCEDURAL STATES: Chronic | ICD-10-CM

## 2018-02-15 DIAGNOSIS — C91.00 ACUTE LYMPHOBLASTIC LEUKEMIA NOT HAVING ACHIEVED REMISSION: ICD-10-CM

## 2018-02-15 DIAGNOSIS — Z41.9 ENCOUNTER FOR PROCEDURE FOR PURPOSES OTHER THAN REMEDYING HEALTH STATE, UNSPECIFIED: Chronic | ICD-10-CM

## 2018-02-15 LAB
BASOPHILS # BLD AUTO: 0.1 K/UL — SIGNIFICANT CHANGE UP (ref 0–0.2)
BASOPHILS NFR BLD AUTO: 1 % — SIGNIFICANT CHANGE UP (ref 0–2)
EOSINOPHIL # BLD AUTO: 0.2 K/UL — SIGNIFICANT CHANGE UP (ref 0–0.5)
EOSINOPHIL NFR BLD AUTO: 2.6 % — SIGNIFICANT CHANGE UP (ref 0–6)
HCT VFR BLD CALC: 35.2 % — SIGNIFICANT CHANGE UP (ref 34.5–45)
HGB BLD-MCNC: 11.7 G/DL — SIGNIFICANT CHANGE UP (ref 11.5–15.5)
LYMPHOCYTES # BLD AUTO: 3.4 K/UL — HIGH (ref 1–3.3)
LYMPHOCYTES # BLD AUTO: 42.5 % — SIGNIFICANT CHANGE UP (ref 13–44)
MCHC RBC-ENTMCNC: 33.4 G/DL — SIGNIFICANT CHANGE UP (ref 32–36)
MCHC RBC-ENTMCNC: 36.3 PG — HIGH (ref 27–34)
MCV RBC AUTO: 109 FL — HIGH (ref 80–100)
MONOCYTES # BLD AUTO: 0.6 K/UL — SIGNIFICANT CHANGE UP (ref 0–0.9)
MONOCYTES NFR BLD AUTO: 7 % — SIGNIFICANT CHANGE UP (ref 2–14)
NEUTROPHILS # BLD AUTO: 3.7 K/UL — SIGNIFICANT CHANGE UP (ref 1.8–7.4)
NEUTROPHILS NFR BLD AUTO: 46.8 % — SIGNIFICANT CHANGE UP (ref 43–77)
PLATELET # BLD AUTO: 132 K/UL — LOW (ref 150–400)
RBC # BLD: 3.24 M/UL — LOW (ref 3.8–5.2)
RBC # FLD: 11.8 % — SIGNIFICANT CHANGE UP (ref 10.3–14.5)
WBC # BLD: 8 K/UL — SIGNIFICANT CHANGE UP (ref 3.8–10.5)
WBC # FLD AUTO: 8 K/UL — SIGNIFICANT CHANGE UP (ref 3.8–10.5)

## 2018-02-15 PROCEDURE — 90714 TD VACC NO PRESV 7 YRS+ IM: CPT

## 2018-02-15 PROCEDURE — 90472 IMMUNIZATION ADMIN EACH ADD: CPT

## 2018-02-15 PROCEDURE — G0452: CPT | Mod: 26

## 2018-02-15 PROCEDURE — 99215 OFFICE O/P EST HI 40 MIN: CPT

## 2018-02-15 PROCEDURE — 90713 POLIOVIRUS IPV SC/IM: CPT

## 2018-02-15 PROCEDURE — 81206 BCR/ABL1 GENE MAJOR BP: CPT

## 2018-02-15 PROCEDURE — 90471 IMMUNIZATION ADMIN: CPT

## 2018-02-16 LAB
ALBUMIN SERPL ELPH-MCNC: 3.9 G/DL
ALP BLD-CCNC: 114 U/L
ALT SERPL-CCNC: 26 U/L
ANION GAP SERPL CALC-SCNC: 13 MMOL/L
AST SERPL-CCNC: 16 U/L
BILIRUB SERPL-MCNC: 0.2 MG/DL
BUN SERPL-MCNC: 24 MG/DL
CALCIUM SERPL-MCNC: 9.9 MG/DL
CHLORIDE SERPL-SCNC: 108 MMOL/L
CO2 SERPL-SCNC: 23 MMOL/L
CREAT SERPL-MCNC: 1.04 MG/DL
GLUCOSE SERPL-MCNC: 95 MG/DL
LDH SERPL-CCNC: 206 U/L
MAGNESIUM SERPL-MCNC: 2.1 MG/DL
POTASSIUM SERPL-SCNC: 4.7 MMOL/L
PROT SERPL-MCNC: 5.8 G/DL
SODIUM SERPL-SCNC: 144 MMOL/L

## 2018-02-23 LAB — BCR/ABL BY RT - PCR QUANTITATIVE: SIGNIFICANT CHANGE UP

## 2018-03-20 ENCOUNTER — MEDICATION RENEWAL (OUTPATIENT)
Age: 58
End: 2018-03-20

## 2018-04-09 ENCOUNTER — OUTPATIENT (OUTPATIENT)
Dept: OUTPATIENT SERVICES | Facility: HOSPITAL | Age: 58
LOS: 1 days | Discharge: HOME | End: 2018-04-09

## 2018-04-09 DIAGNOSIS — Z41.9 ENCOUNTER FOR PROCEDURE FOR PURPOSES OTHER THAN REMEDYING HEALTH STATE, UNSPECIFIED: Chronic | ICD-10-CM

## 2018-04-09 DIAGNOSIS — C91.00 ACUTE LYMPHOBLASTIC LEUKEMIA NOT HAVING ACHIEVED REMISSION: ICD-10-CM

## 2018-04-09 DIAGNOSIS — Z98.89 OTHER SPECIFIED POSTPROCEDURAL STATES: Chronic | ICD-10-CM

## 2018-04-12 ENCOUNTER — LABORATORY RESULT (OUTPATIENT)
Age: 58
End: 2018-04-12

## 2018-04-12 ENCOUNTER — RESULT REVIEW (OUTPATIENT)
Age: 58
End: 2018-04-12

## 2018-04-12 ENCOUNTER — OUTPATIENT (OUTPATIENT)
Dept: OUTPATIENT SERVICES | Facility: HOSPITAL | Age: 58
LOS: 1 days | End: 2018-04-12
Payer: COMMERCIAL

## 2018-04-12 ENCOUNTER — APPOINTMENT (OUTPATIENT)
Dept: INFUSION THERAPY | Facility: HOSPITAL | Age: 58
End: 2018-04-12

## 2018-04-12 ENCOUNTER — APPOINTMENT (OUTPATIENT)
Dept: HEMATOLOGY ONCOLOGY | Facility: CLINIC | Age: 58
End: 2018-04-12
Payer: MEDICAID

## 2018-04-12 VITALS
OXYGEN SATURATION: 98 % | WEIGHT: 249.12 LBS | HEART RATE: 103 BPM | DIASTOLIC BLOOD PRESSURE: 80 MMHG | BODY MASS INDEX: 44.42 KG/M2 | RESPIRATION RATE: 17 BRPM | SYSTOLIC BLOOD PRESSURE: 106 MMHG | TEMPERATURE: 98.1 F

## 2018-04-12 DIAGNOSIS — C91.00 ACUTE LYMPHOBLASTIC LEUKEMIA NOT HAVING ACHIEVED REMISSION: ICD-10-CM

## 2018-04-12 DIAGNOSIS — Z87.19 PERSONAL HISTORY OF OTHER DISEASES OF THE DIGESTIVE SYSTEM: ICD-10-CM

## 2018-04-12 DIAGNOSIS — Z98.89 OTHER SPECIFIED POSTPROCEDURAL STATES: Chronic | ICD-10-CM

## 2018-04-12 DIAGNOSIS — Z41.9 ENCOUNTER FOR PROCEDURE FOR PURPOSES OTHER THAN REMEDYING HEALTH STATE, UNSPECIFIED: Chronic | ICD-10-CM

## 2018-04-12 LAB
BASOPHILS # BLD AUTO: 0.1 K/UL — SIGNIFICANT CHANGE UP (ref 0–0.2)
BASOPHILS NFR BLD AUTO: 0.8 % — SIGNIFICANT CHANGE UP (ref 0–2)
EOSINOPHIL # BLD AUTO: 0.2 K/UL — SIGNIFICANT CHANGE UP (ref 0–0.5)
EOSINOPHIL NFR BLD AUTO: 2 % — SIGNIFICANT CHANGE UP (ref 0–6)
HCT VFR BLD CALC: 40.6 % — SIGNIFICANT CHANGE UP (ref 34.5–45)
HGB BLD-MCNC: 13.7 G/DL — SIGNIFICANT CHANGE UP (ref 11.5–15.5)
LYMPHOCYTES # BLD AUTO: 2.6 K/UL — SIGNIFICANT CHANGE UP (ref 1–3.3)
LYMPHOCYTES # BLD AUTO: 29.4 % — SIGNIFICANT CHANGE UP (ref 13–44)
MCHC RBC-ENTMCNC: 33.8 G/DL — SIGNIFICANT CHANGE UP (ref 32–36)
MCHC RBC-ENTMCNC: 36.1 PG — HIGH (ref 27–34)
MCV RBC AUTO: 107 FL — HIGH (ref 80–100)
MONOCYTES # BLD AUTO: 0.6 K/UL — SIGNIFICANT CHANGE UP (ref 0–0.9)
MONOCYTES NFR BLD AUTO: 6.8 % — SIGNIFICANT CHANGE UP (ref 2–14)
NEUTROPHILS # BLD AUTO: 5.4 K/UL — SIGNIFICANT CHANGE UP (ref 1.8–7.4)
NEUTROPHILS NFR BLD AUTO: 60.9 % — SIGNIFICANT CHANGE UP (ref 43–77)
PLATELET # BLD AUTO: 177 K/UL — SIGNIFICANT CHANGE UP (ref 150–400)
RBC # BLD: 3.8 M/UL — SIGNIFICANT CHANGE UP (ref 3.8–5.2)
RBC # FLD: 11 % — SIGNIFICANT CHANGE UP (ref 10.3–14.5)
WBC # BLD: 8.8 K/UL — SIGNIFICANT CHANGE UP (ref 3.8–10.5)
WBC # FLD AUTO: 8.8 K/UL — SIGNIFICANT CHANGE UP (ref 3.8–10.5)

## 2018-04-12 PROCEDURE — 99215 OFFICE O/P EST HI 40 MIN: CPT

## 2018-04-12 PROCEDURE — 88291 CYTO/MOLECULAR REPORT: CPT | Mod: 59

## 2018-04-12 PROCEDURE — 88275 CYTOGENETICS 100-300: CPT

## 2018-04-12 PROCEDURE — G0452: CPT | Mod: 26

## 2018-04-12 PROCEDURE — 81206 BCR/ABL1 GENE MAJOR BP: CPT

## 2018-04-12 PROCEDURE — 88271 CYTOGENETICS DNA PROBE: CPT

## 2018-04-12 PROCEDURE — 88237 TISSUE CULTURE BONE MARROW: CPT

## 2018-04-13 LAB
ALBUMIN SERPL ELPH-MCNC: 4.2 G/DL
ALP BLD-CCNC: 121 U/L
ALT SERPL-CCNC: 20 U/L
ANION GAP SERPL CALC-SCNC: 16 MMOL/L
AST SERPL-CCNC: 13 U/L
BILIRUB SERPL-MCNC: 0.3 MG/DL
BUN SERPL-MCNC: 11 MG/DL
CALCIUM SERPL-MCNC: 9.2 MG/DL
CHLORIDE SERPL-SCNC: 105 MMOL/L
CHOLEST SERPL-MCNC: 176 MG/DL
CHOLEST/HDLC SERPL: 3.1 RATIO
CO2 SERPL-SCNC: 20 MMOL/L
CREAT SERPL-MCNC: 1.04 MG/DL
FERRITIN SERPL-MCNC: 568 NG/ML
GLUCOSE SERPL-MCNC: 83 MG/DL
HDLC SERPL-MCNC: 56 MG/DL
LDH SERPL-CCNC: 227 U/L
LDLC SERPL CALC-MCNC: 100 MG/DL
MAGNESIUM SERPL-MCNC: 2.1 MG/DL
POTASSIUM SERPL-SCNC: 4 MMOL/L
PROT SERPL-MCNC: 6.7 G/DL
SODIUM SERPL-SCNC: 141 MMOL/L
T3 SERPL-MCNC: 89 NG/DL
T3FREE SERPL-MCNC: 2.3 PG/ML
T3RU NFR SERPL: 1.09 INDEX
T4 FREE SERPL-MCNC: 1.3 NG/DL
T4 SERPL-MCNC: 8.5 UG/DL
TRIGL SERPL-MCNC: 101 MG/DL
TSH SERPL-ACNC: 0.8 UIU/ML

## 2018-04-17 LAB
BCR/ABL BY RT - PCR QUANTITATIVE: SIGNIFICANT CHANGE UP
CHROM ANALY INTERPHASE BLD FISH-IMP: SIGNIFICANT CHANGE UP

## 2018-04-19 ENCOUNTER — APPOINTMENT (OUTPATIENT)
Dept: INFUSION THERAPY | Facility: HOSPITAL | Age: 58
End: 2018-04-19
Payer: MEDICAID

## 2018-04-19 PROCEDURE — 90472 IMMUNIZATION ADMIN EACH ADD: CPT

## 2018-04-19 PROCEDURE — 90471 IMMUNIZATION ADMIN: CPT

## 2018-04-19 PROCEDURE — 90713 POLIOVIRUS IPV SC/IM: CPT

## 2018-04-19 PROCEDURE — 90714 TD VACC NO PRESV 7 YRS+ IM: CPT

## 2018-04-20 ENCOUNTER — MEDICATION RENEWAL (OUTPATIENT)
Age: 58
End: 2018-04-20

## 2018-04-20 ENCOUNTER — APPOINTMENT (OUTPATIENT)
Dept: GERIATRICS | Facility: CLINIC | Age: 58
End: 2018-04-20
Payer: MEDICAID

## 2018-04-20 VITALS
SYSTOLIC BLOOD PRESSURE: 120 MMHG | OXYGEN SATURATION: 99 % | RESPIRATION RATE: 16 BRPM | TEMPERATURE: 98.4 F | HEART RATE: 86 BPM | DIASTOLIC BLOOD PRESSURE: 80 MMHG | BODY MASS INDEX: 45.99 KG/M2 | WEIGHT: 257.94 LBS

## 2018-04-20 PROCEDURE — 99205 OFFICE O/P NEW HI 60 MIN: CPT

## 2018-04-20 RX ORDER — TOBRAMYCIN 3 MG/ML
0.3 SOLUTION/ DROPS OPHTHALMIC
Qty: 5 | Refills: 0 | Status: DISCONTINUED | COMMUNITY
Start: 2017-11-30

## 2018-04-20 RX ORDER — OXYCODONE 5 MG/1
5 TABLET ORAL
Qty: 60 | Refills: 0 | Status: DISCONTINUED | COMMUNITY
Start: 2018-04-20 | End: 2018-04-20

## 2018-04-20 RX ORDER — OXYCODONE 5 MG/1
5 TABLET ORAL
Qty: 60 | Refills: 0 | Status: DISCONTINUED | COMMUNITY
Start: 2017-01-17 | End: 2018-04-20

## 2018-05-04 ENCOUNTER — OUTPATIENT (OUTPATIENT)
Dept: OUTPATIENT SERVICES | Facility: HOSPITAL | Age: 58
LOS: 1 days | End: 2018-05-04
Payer: COMMERCIAL

## 2018-05-04 ENCOUNTER — APPOINTMENT (OUTPATIENT)
Dept: MRI IMAGING | Facility: IMAGING CENTER | Age: 58
End: 2018-05-04
Payer: MEDICAID

## 2018-05-04 DIAGNOSIS — Z41.9 ENCOUNTER FOR PROCEDURE FOR PURPOSES OTHER THAN REMEDYING HEALTH STATE, UNSPECIFIED: Chronic | ICD-10-CM

## 2018-05-04 DIAGNOSIS — M54.42 LUMBAGO WITH SCIATICA, LEFT SIDE: ICD-10-CM

## 2018-05-04 DIAGNOSIS — Z98.89 OTHER SPECIFIED POSTPROCEDURAL STATES: Chronic | ICD-10-CM

## 2018-05-04 DIAGNOSIS — M54.41 LUMBAGO WITH SCIATICA, RIGHT SIDE: ICD-10-CM

## 2018-05-04 PROCEDURE — 72158 MRI LUMBAR SPINE W/O & W/DYE: CPT | Mod: 26

## 2018-05-04 PROCEDURE — 72158 MRI LUMBAR SPINE W/O & W/DYE: CPT

## 2018-05-04 PROCEDURE — A9585: CPT

## 2018-05-30 ENCOUNTER — MEDICATION RENEWAL (OUTPATIENT)
Age: 58
End: 2018-05-30

## 2018-06-15 ENCOUNTER — APPOINTMENT (OUTPATIENT)
Dept: GERIATRICS | Facility: CLINIC | Age: 58
End: 2018-06-15
Payer: MEDICAID

## 2018-06-15 VITALS
BODY MASS INDEX: 45.99 KG/M2 | DIASTOLIC BLOOD PRESSURE: 80 MMHG | OXYGEN SATURATION: 100 % | TEMPERATURE: 98.6 F | SYSTOLIC BLOOD PRESSURE: 137 MMHG | RESPIRATION RATE: 16 BRPM | HEART RATE: 77 BPM | WEIGHT: 257.94 LBS

## 2018-06-15 PROCEDURE — 99213 OFFICE O/P EST LOW 20 MIN: CPT

## 2018-07-17 ENCOUNTER — MEDICATION RENEWAL (OUTPATIENT)
Age: 58
End: 2018-07-17

## 2018-08-07 ENCOUNTER — OUTPATIENT (OUTPATIENT)
Dept: OUTPATIENT SERVICES | Facility: HOSPITAL | Age: 58
LOS: 1 days | Discharge: HOME | End: 2018-08-07

## 2018-08-07 DIAGNOSIS — C91.00 ACUTE LYMPHOBLASTIC LEUKEMIA NOT HAVING ACHIEVED REMISSION: ICD-10-CM

## 2018-08-07 DIAGNOSIS — Z41.9 ENCOUNTER FOR PROCEDURE FOR PURPOSES OTHER THAN REMEDYING HEALTH STATE, UNSPECIFIED: Chronic | ICD-10-CM

## 2018-08-07 DIAGNOSIS — Z98.89 OTHER SPECIFIED POSTPROCEDURAL STATES: Chronic | ICD-10-CM

## 2018-08-16 ENCOUNTER — APPOINTMENT (OUTPATIENT)
Dept: HEMATOLOGY ONCOLOGY | Facility: CLINIC | Age: 58
End: 2018-08-16

## 2018-10-01 ENCOUNTER — OUTPATIENT (OUTPATIENT)
Dept: OUTPATIENT SERVICES | Facility: HOSPITAL | Age: 58
LOS: 1 days | End: 2018-10-01
Payer: MEDICARE

## 2018-10-01 DIAGNOSIS — Z41.9 ENCOUNTER FOR PROCEDURE FOR PURPOSES OTHER THAN REMEDYING HEALTH STATE, UNSPECIFIED: Chronic | ICD-10-CM

## 2018-10-01 DIAGNOSIS — Z98.89 OTHER SPECIFIED POSTPROCEDURAL STATES: Chronic | ICD-10-CM

## 2018-10-04 ENCOUNTER — APPOINTMENT (OUTPATIENT)
Dept: HEMATOLOGY ONCOLOGY | Facility: CLINIC | Age: 58
End: 2018-10-04

## 2018-10-04 ENCOUNTER — OUTPATIENT (OUTPATIENT)
Dept: OUTPATIENT SERVICES | Facility: HOSPITAL | Age: 58
LOS: 1 days | Discharge: HOME | End: 2018-10-04

## 2018-10-04 ENCOUNTER — OUTPATIENT (OUTPATIENT)
Dept: OUTPATIENT SERVICES | Facility: HOSPITAL | Age: 58
LOS: 1 days | End: 2018-10-04
Payer: MEDICARE

## 2018-10-04 ENCOUNTER — INPATIENT (INPATIENT)
Facility: HOSPITAL | Age: 58
LOS: 46 days | Discharge: HOME | DRG: 834 | End: 2018-11-20
Attending: INTERNAL MEDICINE | Admitting: STUDENT IN AN ORGANIZED HEALTH CARE EDUCATION/TRAINING PROGRAM
Payer: MEDICARE

## 2018-10-04 ENCOUNTER — APPOINTMENT (OUTPATIENT)
Dept: HEMATOLOGY ONCOLOGY | Facility: CLINIC | Age: 58
End: 2018-10-04
Payer: MEDICARE

## 2018-10-04 VITALS
OXYGEN SATURATION: 100 % | RESPIRATION RATE: 20 BRPM | SYSTOLIC BLOOD PRESSURE: 112 MMHG | TEMPERATURE: 99 F | HEIGHT: 63 IN | WEIGHT: 257.94 LBS | DIASTOLIC BLOOD PRESSURE: 78 MMHG | HEART RATE: 93 BPM

## 2018-10-04 DIAGNOSIS — Z41.9 ENCOUNTER FOR PROCEDURE FOR PURPOSES OTHER THAN REMEDYING HEALTH STATE, UNSPECIFIED: Chronic | ICD-10-CM

## 2018-10-04 DIAGNOSIS — D70.9 NEUTROPENIA, UNSPECIFIED: ICD-10-CM

## 2018-10-04 DIAGNOSIS — C91.00 ACUTE LYMPHOBLASTIC LEUKEMIA NOT HAVING ACHIEVED REMISSION: ICD-10-CM

## 2018-10-04 DIAGNOSIS — Z98.89 OTHER SPECIFIED POSTPROCEDURAL STATES: Chronic | ICD-10-CM

## 2018-10-04 LAB
ALBUMIN SERPL ELPH-MCNC: 3.9 G/DL — SIGNIFICANT CHANGE UP (ref 3.3–5)
ALP SERPL-CCNC: 137 U/L — HIGH (ref 40–120)
ALT FLD-CCNC: 28 U/L — SIGNIFICANT CHANGE UP (ref 10–45)
ANION GAP SERPL CALC-SCNC: 12 MMOL/L — SIGNIFICANT CHANGE UP (ref 5–17)
APTT BLD: 26.7 SEC — LOW (ref 27.5–37.4)
AST SERPL-CCNC: 18 U/L — SIGNIFICANT CHANGE UP (ref 10–40)
BASE EXCESS BLDV CALC-SCNC: -1.5 MMOL/L — SIGNIFICANT CHANGE UP (ref -2–2)
BASOPHILS # BLD AUTO: 0.1 K/UL — SIGNIFICANT CHANGE UP (ref 0–0.2)
BILIRUB SERPL-MCNC: 0.3 MG/DL — SIGNIFICANT CHANGE UP (ref 0.2–1.2)
BLASTS # FLD: 76 % — HIGH (ref 0–0)
BLD GP AB SCN SERPL QL: NEGATIVE — SIGNIFICANT CHANGE UP
BUN SERPL-MCNC: 16 MG/DL — SIGNIFICANT CHANGE UP (ref 7–23)
CA-I SERPL-SCNC: 1.28 MMOL/L — SIGNIFICANT CHANGE UP (ref 1.12–1.3)
CALCIUM SERPL-MCNC: 9.9 MG/DL — SIGNIFICANT CHANGE UP (ref 8.4–10.5)
CHLORIDE BLDV-SCNC: 108 MMOL/L — SIGNIFICANT CHANGE UP (ref 96–108)
CHLORIDE SERPL-SCNC: 104 MMOL/L — SIGNIFICANT CHANGE UP (ref 96–108)
CO2 BLDV-SCNC: 24 MMOL/L — SIGNIFICANT CHANGE UP (ref 22–30)
CO2 SERPL-SCNC: 20 MMOL/L — LOW (ref 22–31)
CREAT SERPL-MCNC: 1.32 MG/DL — HIGH (ref 0.5–1.3)
EOSINOPHIL # BLD AUTO: 0.1 K/UL — SIGNIFICANT CHANGE UP (ref 0–0.5)
ERYTHROCYTE [SEDIMENTATION RATE] IN BLOOD: 19 MM/HR — SIGNIFICANT CHANGE UP (ref 0–20)
FIBRINOGEN PPP-MCNC: 375 MG/DL — SIGNIFICANT CHANGE UP (ref 310–510)
GAS PNL BLDV: 137 MMOL/L — SIGNIFICANT CHANGE UP (ref 136–145)
GAS PNL BLDV: SIGNIFICANT CHANGE UP
GLUCOSE BLDV-MCNC: 113 MG/DL — HIGH (ref 70–99)
GLUCOSE SERPL-MCNC: 108 MG/DL — HIGH (ref 70–99)
HCO3 BLDV-SCNC: 23 MMOL/L — SIGNIFICANT CHANGE UP (ref 21–29)
HCT VFR BLD CALC: 27.6 % — LOW (ref 34.5–45)
HCT VFR BLDA CALC: 27 % — LOW (ref 39–50)
HGB BLD CALC-MCNC: 8.8 G/DL — LOW (ref 11.5–15.5)
HGB BLD-MCNC: 9.3 G/DL — LOW (ref 11.5–15.5)
INR BLD: 0.97 RATIO — SIGNIFICANT CHANGE UP (ref 0.88–1.16)
LACTATE BLDV-MCNC: 1 MMOL/L — SIGNIFICANT CHANGE UP (ref 0.7–2)
LDH SERPL L TO P-CCNC: 182 U/L — SIGNIFICANT CHANGE UP (ref 50–242)
LYMPHOCYTES # BLD AUTO: 21 % — SIGNIFICANT CHANGE UP (ref 13–44)
LYMPHOCYTES # BLD AUTO: SIGNIFICANT CHANGE UP (ref 1–3.3)
MCHC RBC-ENTMCNC: 33.6 GM/DL — SIGNIFICANT CHANGE UP (ref 32–36)
MCHC RBC-ENTMCNC: 34.8 PG — HIGH (ref 27–34)
MCV RBC AUTO: 104 FL — HIGH (ref 80–100)
MONOCYTES # BLD AUTO: 2.5 K/UL — HIGH (ref 0–0.9)
MONOCYTES NFR BLD AUTO: 2 % — SIGNIFICANT CHANGE UP (ref 2–14)
NEUTROPHILS # BLD AUTO: 0.9 K/UL — LOW (ref 1.8–7.4)
NEUTROPHILS NFR BLD AUTO: 1 % — LOW (ref 43–77)
PCO2 BLDV: 41 MMHG — SIGNIFICANT CHANGE UP (ref 35–50)
PH BLDV: 7.37 — SIGNIFICANT CHANGE UP (ref 7.35–7.45)
PHOSPHATE SERPL-MCNC: 3.6 MG/DL — SIGNIFICANT CHANGE UP (ref 2.5–4.5)
PLAT MORPH BLD: NORMAL — SIGNIFICANT CHANGE UP
PLATELET # BLD AUTO: 2 K/UL — CRITICAL LOW (ref 150–400)
PO2 BLDV: 28 MMHG — SIGNIFICANT CHANGE UP (ref 25–45)
POTASSIUM BLDV-SCNC: 3.4 MMOL/L — LOW (ref 3.5–5.3)
POTASSIUM SERPL-MCNC: 3.9 MMOL/L — SIGNIFICANT CHANGE UP (ref 3.5–5.3)
POTASSIUM SERPL-SCNC: 3.9 MMOL/L — SIGNIFICANT CHANGE UP (ref 3.5–5.3)
PROT SERPL-MCNC: 6.2 G/DL — SIGNIFICANT CHANGE UP (ref 6–8.3)
PROTHROM AB SERPL-ACNC: 10.6 SEC — SIGNIFICANT CHANGE UP (ref 9.8–12.7)
RBC # BLD: 2.66 M/UL — LOW (ref 3.8–5.2)
RBC # FLD: 13.1 % — SIGNIFICANT CHANGE UP (ref 10.3–14.5)
RBC BLD AUTO: SIGNIFICANT CHANGE UP
RH IG SCN BLD-IMP: POSITIVE — SIGNIFICANT CHANGE UP
SAO2 % BLDV: 42 % — LOW (ref 67–88)
SODIUM SERPL-SCNC: 136 MMOL/L — SIGNIFICANT CHANGE UP (ref 135–145)
URATE SERPL-MCNC: 6.4 MG/DL — SIGNIFICANT CHANGE UP (ref 2.5–7)
WBC # BLD: 39.7 K/UL — HIGH (ref 3.8–10.5)
WBC # FLD AUTO: 39.7 K/UL — HIGH (ref 3.8–10.5)

## 2018-10-04 PROCEDURE — 99212 OFFICE O/P EST SF 10 MIN: CPT

## 2018-10-04 PROCEDURE — 70450 CT HEAD/BRAIN W/O DYE: CPT | Mod: 26

## 2018-10-04 PROCEDURE — 99285 EMERGENCY DEPT VISIT HI MDM: CPT | Mod: 25

## 2018-10-04 PROCEDURE — 93010 ELECTROCARDIOGRAM REPORT: CPT

## 2018-10-04 RX ORDER — MORPHINE SULFATE 50 MG/1
2 CAPSULE, EXTENDED RELEASE ORAL ONCE
Qty: 0 | Refills: 0 | Status: DISCONTINUED | OUTPATIENT
Start: 2018-10-04 | End: 2018-10-04

## 2018-10-04 RX ORDER — KETOROLAC TROMETHAMINE 30 MG/ML
30 SYRINGE (ML) INJECTION ONCE
Qty: 0 | Refills: 0 | Status: DISCONTINUED | OUTPATIENT
Start: 2018-10-04 | End: 2018-10-04

## 2018-10-04 RX ORDER — DIPHENHYDRAMINE HCL 50 MG
25 CAPSULE ORAL ONCE
Qty: 0 | Refills: 0 | Status: COMPLETED | OUTPATIENT
Start: 2018-10-04 | End: 2018-10-04

## 2018-10-04 RX ORDER — ALLOPURINOL 300 MG
300 TABLET ORAL ONCE
Qty: 0 | Refills: 0 | Status: COMPLETED | OUTPATIENT
Start: 2018-10-04 | End: 2018-10-04

## 2018-10-04 RX ORDER — LEVETIRACETAM 250 MG/1
500 TABLET, FILM COATED ORAL
Qty: 0 | Refills: 0 | Status: DISCONTINUED | OUTPATIENT
Start: 2018-10-04 | End: 2018-11-20

## 2018-10-04 RX ORDER — SODIUM CHLORIDE 9 MG/ML
1000 INJECTION INTRAMUSCULAR; INTRAVENOUS; SUBCUTANEOUS
Qty: 0 | Refills: 0 | Status: DISCONTINUED | OUTPATIENT
Start: 2018-10-04 | End: 2018-10-07

## 2018-10-04 RX ORDER — METOCLOPRAMIDE HCL 10 MG
10 TABLET ORAL ONCE
Qty: 0 | Refills: 0 | Status: COMPLETED | OUTPATIENT
Start: 2018-10-04 | End: 2018-10-04

## 2018-10-04 RX ORDER — ONDANSETRON 8 MG/1
4 TABLET, FILM COATED ORAL ONCE
Qty: 0 | Refills: 0 | Status: COMPLETED | OUTPATIENT
Start: 2018-10-04 | End: 2018-10-05

## 2018-10-04 RX ORDER — DESMOPRESSIN ACETATE 0.1 MG/1
35 TABLET ORAL ONCE
Qty: 0 | Refills: 0 | Status: COMPLETED | OUTPATIENT
Start: 2018-10-04 | End: 2018-10-04

## 2018-10-04 RX ADMIN — DESMOPRESSIN ACETATE 235 MICROGRAM(S): 0.1 TABLET ORAL at 21:35

## 2018-10-04 RX ADMIN — MORPHINE SULFATE 2 MILLIGRAM(S): 50 CAPSULE, EXTENDED RELEASE ORAL at 21:23

## 2018-10-04 RX ADMIN — Medication 10 MILLIGRAM(S): at 19:19

## 2018-10-04 RX ADMIN — Medication 30 MILLIGRAM(S): at 19:50

## 2018-10-04 RX ADMIN — Medication 25 MILLIGRAM(S): at 19:19

## 2018-10-04 RX ADMIN — SODIUM CHLORIDE 150 MILLILITER(S): 9 INJECTION INTRAMUSCULAR; INTRAVENOUS; SUBCUTANEOUS at 20:36

## 2018-10-04 RX ADMIN — MORPHINE SULFATE 2 MILLIGRAM(S): 50 CAPSULE, EXTENDED RELEASE ORAL at 20:50

## 2018-10-04 RX ADMIN — Medication 30 MILLIGRAM(S): at 19:20

## 2018-10-04 NOTE — ED ADULT NURSE NOTE - OBJECTIVE STATEMENT
58 yr old female a/oX 3 c/o left sided headache for 3 days.  Pt has a PMH of migraine headaches many years ago.  PERRL wnl, jimenez withe qual strength.  Headache came on gradually and was associated with an episode of near syncope in the shower.  Denies any trauma.  No chest pain or sob.  No N/V/F.  Abdomen NT ND with BS+4Q.  SKIN WDI.  Peripheral pulses +2bl no edema

## 2018-10-04 NOTE — ED CLERICAL - NS ED CLERK NOTE PRE-ARRIVAL INFORMATION; ADDITIONAL PRE-ARRIVAL INFORMATION
CC/Reason For referral: Headaches and Vomiting, Bruising on arms, Hx of ALL s/p stem cell transplant  Preferred Consultant(if applicable): N/A  Who admits for you (if needed): Hospitalist  Do you have documents you would like to fax over? No  Would you still like to speak to an ED attending? No

## 2018-10-04 NOTE — ED ADULT TRIAGE NOTE - CHIEF COMPLAINT QUOTE
headache since Friday, bruising to BLLE, BLUE, abdomen with no obvious reason, loss of appetite, nausea  pt has been in remission from leukemia for 2 years

## 2018-10-04 NOTE — CONSULT NOTE ADULT - SUBJECTIVE AND OBJECTIVE BOX
p ([phone])     HPI: Kellen Galeas 57 yo female with pmh of ALL dx 2016 sp chemotherapy (last dose years ago) and ommaya "several years ago" and cannot recall where procedure was done at this time.  Now sp bmt 2016.  Here with persistent HA for the past week, mild nausea.  No vomitting.  Also complains of night sweats/lethargy. Nondrinker/smoker.  Live in apartment alone.  CTH showed very small bl acute on chronic sdh, no shift. Small interhemispheric and left tentorial heme. Of note PLT count 2. Was given tordal in ER.    PAST MEDICAL HISTORY   Leukemia  Clostridium difficile diarrhea  Intractable migraine with status migrainosus, unspecified migraine type  Sciatica of right side  Chronic gastritis, presence of bleeding unspecified, unspecified gastritis type  Essential hypertension    PAST SURGICAL HISTORY   Elective surgical procedure  History of  delivery    shellfish (Nausea; Vomiting)    Other:  sodium chloride 0.9%. 1000 milliLiter(s) IV Continuous <Continuous>      SOCIAL HISTORY:   Occupation:   Marital Status:     FAMILY HISTORY:  No pertinent family history in first degree relatives      REVIEW OF SYSTEMS:  Check here if all are normal other than Neurological/HPI [x]  General:  Eyes:  ENT:  Cardiac:  Respiratory:  GI:  Musculoskeletal:   Skin:  Neurologic:   Psychiatric:     PHYSICAL EXAMINATION:   T(C): 37.3 (10-04-18 @ 17:55), Max: 37.3 (10-04-18 @ 17:55)  HR: 83 (10-04-18 @ 19:38) (83 - 93)  BP: 109/73 (10-04-18 @ 19:38) (109/73 - 112/78)  RR: 20 (10-04-18 @ 19:38) (20 - 20)  SpO2: 100% (10-04-18 @ 19:38) (100% - 100%)  Wt(kg): --Height (cm): 160.02 (10-04 @ 17:55)  Weight (kg): 117 (10-04 @ 17:55)    Neurologic Examination:           AOx3, FC, PERRL, EOMI, no facial   5/5 throughout, no drift  SILT  no clonus  multiple sites of bruising on BUE    LABS:                        9.3    39.7  )-----------( 2        ( 04 Oct 2018 19:09 )             27.6     10-04    136  |  104  |  16  ----------------------------<  108<H>  3.9   |  20<L>  |  1.32<H>    Ca    9.9      04 Oct 2018 19:09    TPro  6.2  /  Alb  3.9  /  TBili  0.3  /  DBili  x   /  AST  18  /  ALT  28  /  AlkPhos  137<H>  10-04          RADIOLOGY & ADDITIONAL STUDIES:

## 2018-10-04 NOTE — ED PROVIDER NOTE - OBJECTIVE STATEMENT
58yof pmhx of migraine ha, Leukemia in remission for 2 years, shingles dx 3 weeks ago- finished the treatement, here for L parietal headache, gradual onset started 6 days ago, pressure like sensation, radiation to the L frontal region. 8/10 pain, constant pain. exacerbated by walking accompanied with dizziness. +nausea no vomiting. +decrease appetite. last migraine 5 years ago. no relief with excedrin, aleve. No fever no vision changes, no chest pain, vomiting diarrhea. 58yof pmhx of migraine ha, Leukemia in remission for 2 years, shingles dx 3 weeks ago- finished the treatement, here for L parietal headache, gradual onset started 6 days ago, pressure like sensation, radiation to the L frontal region. 8/10 pain, constant pain. exacerbated by walking accompanied with dizziness. +nausea no vomiting. +decrease appetite. last migraine 5 years ago. no relief with Excedrin,  or aleve. No fever no vision changes, no chest pain, vomiting diarrhea.

## 2018-10-04 NOTE — ED ADULT NURSE REASSESSMENT NOTE - NS ED NURSE REASSESS COMMENT FT1
Darci from blood bank states "were low on inventory, I wont be able to release the second unit until a delivery arrives"  GEORGES fournier

## 2018-10-04 NOTE — ED ADULT NURSE REASSESSMENT NOTE - NS ED NURSE REASSESS COMMENT FT1
1 unit platelets given. Consent in chart. Risks and benefits explained to patient. Patient verbalized understanding of risks and benefits. Patient aware of possible side effects. Vital signs stable. Second RN at bedside for confirmation.

## 2018-10-04 NOTE — ED PROVIDER NOTE - PROGRESS NOTE DETAILS
spoke to Hem/Onc fellow and made aware of lab results and CT results. Additional labs ordered along with sepsis bloodwork, given pt is neutropenic. But advised to hold off antibiotics. Neurosx paged and pt ordered for plt transfusion along with DDAVP. No female [facility] beds so pt to be admitted to hospitalist service. Hospitalist paged.

## 2018-10-04 NOTE — CONSULT NOTE ADULT - ASSESSMENT
Kellen Galeas 57 yo female with pmh of ALL dx 4/2016 sp chemotherapy (last dose years ago) and ommaya.  Here with persistent HA for the past week, mild nausea.  No vomitting.  CTH showed very small bl acute on chronic sdh, no shift. Small interhemispheric and left tentorial heme. Of note PLT count 2. Was given tordal in ER.    -ddavp/plt x2-3  (goal > 80)  -cth in 4 hours and AM  -keppra 500 bid  -medicine Kellen Galeas 57 yo female with pmh of ALL dx 4/2016 sp chemotherapy (last dose years ago) and ommaya.  Here with persistent HA for the past week, mild nausea.  No vomitting.  CTH showed very small bl acute on chronic sdh, no shift. Small interhemispheric and left tentorial heme. Of note PLT count 2. Was given tordal in ER.    -ddavp/plt x2-3  (goal > 80)  -cth in 4 hours and AM  -after cth 4 hr and plts/repeat cbc may get neurochecks q4 on floor  -keppra 500 bid  -medicine

## 2018-10-04 NOTE — ED ADULT NURSE REASSESSMENT NOTE - NS ED NURSE REASSESS COMMENT FT1
additional labs drawn and sent  pt states headache is worsening, now 9/10  MD craft aware of pts headache  neurosurg Md Alicia at bedside additional labs drawn and sent  pt states headache is worsening, now 9/10  MD Hernandez/GEORGES Hannah aware of pts headache  neurosurg Md Alicia at bedside

## 2018-10-04 NOTE — ED PROVIDER NOTE - PHYSICAL EXAMINATION
nl gait with cane. +headache   EOMI no nystagmus.   +b/l Upper and lower ext ecchymosis in different stages of healing.

## 2018-10-04 NOTE — ED PROVIDER NOTE - MEDICAL DECISION MAKING DETAILS
58yof black female with history of Maries + ALL in remission for 2 years s/p stem cell transplant f/u Dr. Dawn Bentley, history of migraine c.o R sided unilateral  headache for one week. with dizziness, nausea. NO fever no chills. She did not have migraine attack for long time. and it feels more stronger than usual ha. PE- pale obese mild tenderness of the R temporal area. Rest- non focal exam.   Will obtain CT head, bloodwork, pain medication and re eval. ZR

## 2018-10-04 NOTE — ED ADULT NURSE REASSESSMENT NOTE - NS ED NURSE REASSESS COMMENT FT1
Report received from  Ralph TELLEZ RN  Pt resting comfortably with family at bedside.   Awaiting Ct head, medicated as per MD orders  Safety maintained at all times, bed in lowest position, call bell in hand.  Will continue to monitor closely.

## 2018-10-04 NOTE — ED ADULT NURSE REASSESSMENT NOTE - NS ED NURSE REASSESS COMMENT FT1
pt medicated for headache as per MD orders  ED Tech performing EKG at bedside  will continue to monitor closely

## 2018-10-04 NOTE — ED ADULT NURSE REASSESSMENT NOTE - NS ED NURSE REASSESS COMMENT FT1
pharmacy states will send DDAVP  pending T&S results too administer platelets  [facility] [place] states pt must received 2 units of platelets before pt is RTM

## 2018-10-05 ENCOUNTER — TRANSCRIPTION ENCOUNTER (OUTPATIENT)
Age: 58
End: 2018-10-05

## 2018-10-05 DIAGNOSIS — C95.90 LEUKEMIA, UNSPECIFIED NOT HAVING ACHIEVED REMISSION: ICD-10-CM

## 2018-10-05 DIAGNOSIS — D64.9 ANEMIA, UNSPECIFIED: ICD-10-CM

## 2018-10-05 DIAGNOSIS — S06.5X9A TRAUMATIC SUBDURAL HEMORRHAGE WITH LOSS OF CONSCIOUSNESS OF UNSPECIFIED DURATION, INITIAL ENCOUNTER: ICD-10-CM

## 2018-10-05 DIAGNOSIS — D69.6 THROMBOCYTOPENIA, UNSPECIFIED: ICD-10-CM

## 2018-10-05 DIAGNOSIS — Z29.9 ENCOUNTER FOR PROPHYLACTIC MEASURES, UNSPECIFIED: ICD-10-CM

## 2018-10-05 DIAGNOSIS — K92.2 GASTROINTESTINAL HEMORRHAGE, UNSPECIFIED: ICD-10-CM

## 2018-10-05 DIAGNOSIS — B99.9 UNSPECIFIED INFECTIOUS DISEASE: ICD-10-CM

## 2018-10-05 DIAGNOSIS — C91.00 ACUTE LYMPHOBLASTIC LEUKEMIA NOT HAVING ACHIEVED REMISSION: ICD-10-CM

## 2018-10-05 DIAGNOSIS — D17.9 BENIGN LIPOMATOUS NEOPLASM, UNSPECIFIED: Chronic | ICD-10-CM

## 2018-10-05 DIAGNOSIS — N17.9 ACUTE KIDNEY FAILURE, UNSPECIFIED: ICD-10-CM

## 2018-10-05 LAB
ALBUMIN SERPL ELPH-MCNC: 3.1 G/DL — LOW (ref 3.3–5)
ALP SERPL-CCNC: 99 U/L — SIGNIFICANT CHANGE UP (ref 40–120)
ALT FLD-CCNC: 21 U/L — SIGNIFICANT CHANGE UP (ref 10–45)
ANION GAP SERPL CALC-SCNC: 8 MMOL/L — SIGNIFICANT CHANGE UP (ref 5–17)
AST SERPL-CCNC: 11 U/L — SIGNIFICANT CHANGE UP (ref 10–40)
BASOPHILS # BLD AUTO: 0 K/UL — SIGNIFICANT CHANGE UP (ref 0–0.2)
BASOPHILS # BLD AUTO: SIGNIFICANT CHANGE UP (ref 0–0.2)
BILIRUB SERPL-MCNC: 0.4 MG/DL — SIGNIFICANT CHANGE UP (ref 0.2–1.2)
BLASTS # FLD: 72 % — HIGH (ref 0–0)
BUN SERPL-MCNC: 17 MG/DL — SIGNIFICANT CHANGE UP (ref 7–23)
CALCIUM SERPL-MCNC: 8.6 MG/DL — SIGNIFICANT CHANGE UP (ref 8.4–10.5)
CHLORIDE SERPL-SCNC: 105 MMOL/L — SIGNIFICANT CHANGE UP (ref 96–108)
CO2 SERPL-SCNC: 22 MMOL/L — SIGNIFICANT CHANGE UP (ref 22–31)
CREAT SERPL-MCNC: 1.2 MG/DL — SIGNIFICANT CHANGE UP (ref 0.5–1.3)
EOSINOPHIL # BLD AUTO: 0 K/UL — SIGNIFICANT CHANGE UP (ref 0–0.5)
EOSINOPHIL # BLD AUTO: SIGNIFICANT CHANGE UP (ref 0–0.5)
FERRITIN SERPL-MCNC: 1238 NG/ML — HIGH (ref 15–150)
FOLATE SERPL-MCNC: >20 NG/ML — SIGNIFICANT CHANGE UP
GLUCOSE SERPL-MCNC: 97 MG/DL — SIGNIFICANT CHANGE UP (ref 70–99)
HAV IGM SER-ACNC: SIGNIFICANT CHANGE UP
HBA1C BLD-MCNC: 5.6 % — SIGNIFICANT CHANGE UP (ref 4–5.6)
HBV CORE IGM SER-ACNC: SIGNIFICANT CHANGE UP
HBV SURFACE AG SER-ACNC: SIGNIFICANT CHANGE UP
HCT VFR BLD CALC: 20.6 % — CRITICAL LOW (ref 34.5–45)
HCT VFR BLD CALC: 23.1 % — LOW (ref 34.5–45)
HCV AB S/CO SERPL IA: 0.05 S/CO — SIGNIFICANT CHANGE UP
HCV AB SERPL-IMP: SIGNIFICANT CHANGE UP
HGB BLD-MCNC: 7.1 G/DL — LOW (ref 11.5–15.5)
HGB BLD-MCNC: 8 G/DL — LOW (ref 11.5–15.5)
HIV 1+2 AB+HIV1 P24 AG SERPL QL IA: SIGNIFICANT CHANGE UP
IRON SATN MFR SERPL: 192 UG/DL — HIGH (ref 30–160)
IRON SATN MFR SERPL: 91 % — HIGH (ref 14–50)
LYMPHOCYTES # BLD AUTO: 18 % — SIGNIFICANT CHANGE UP (ref 13–44)
LYMPHOCYTES # BLD AUTO: 23 % — SIGNIFICANT CHANGE UP (ref 13–44)
LYMPHOCYTES # BLD AUTO: SIGNIFICANT CHANGE UP (ref 1–3.3)
LYMPHOCYTES # BLD AUTO: SIGNIFICANT CHANGE UP (ref 1–3.3)
MAGNESIUM SERPL-MCNC: 2 MG/DL — SIGNIFICANT CHANGE UP (ref 1.6–2.6)
MANUAL DIF COMMENT BLD-IMP: SIGNIFICANT CHANGE UP
MCHC RBC-ENTMCNC: 34.4 GM/DL — SIGNIFICANT CHANGE UP (ref 32–36)
MCHC RBC-ENTMCNC: 34.4 PG — HIGH (ref 27–34)
MCHC RBC-ENTMCNC: 34.6 GM/DL — SIGNIFICANT CHANGE UP (ref 32–36)
MCHC RBC-ENTMCNC: 35.5 PG — HIGH (ref 27–34)
MCV RBC AUTO: 103 FL — HIGH (ref 80–100)
MCV RBC AUTO: 99.4 FL — SIGNIFICANT CHANGE UP (ref 80–100)
MONOCYTES # BLD AUTO: 0.5 K/UL — SIGNIFICANT CHANGE UP (ref 0–0.9)
MONOCYTES # BLD AUTO: 1 K/UL — HIGH (ref 0–0.9)
MONOCYTES NFR BLD AUTO: 4 % — SIGNIFICANT CHANGE UP (ref 2–14)
NEUTROPHILS # BLD AUTO: 0.3 K/UL — LOW (ref 1.8–7.4)
NEUTROPHILS # BLD AUTO: 0.6 K/UL — LOW (ref 1.8–7.4)
NEUTROPHILS NFR BLD AUTO: 3 % — LOW (ref 43–77)
NEUTROPHILS NFR BLD AUTO: 5 % — LOW (ref 43–77)
PHOSPHATE SERPL-MCNC: 3.5 MG/DL — SIGNIFICANT CHANGE UP (ref 2.5–4.5)
PLAT MORPH BLD: NORMAL — SIGNIFICANT CHANGE UP
PLATELET # BLD AUTO: 57 K/UL — LOW (ref 150–400)
PLATELET # BLD AUTO: 84 K/UL — LOW (ref 150–400)
PLATELET # BLD AUTO: 93 K/UL — LOW (ref 150–400)
POTASSIUM SERPL-MCNC: 3.9 MMOL/L — SIGNIFICANT CHANGE UP (ref 3.5–5.3)
POTASSIUM SERPL-SCNC: 3.9 MMOL/L — SIGNIFICANT CHANGE UP (ref 3.5–5.3)
PROT SERPL-MCNC: 5.5 G/DL — LOW (ref 6–8.3)
RBC # BLD: 1.99 M/UL — LOW (ref 3.8–5.2)
RBC # BLD: 1.99 M/UL — LOW (ref 3.8–5.2)
RBC # BLD: 2.32 M/UL — LOW (ref 3.8–5.2)
RBC # FLD: 13 % — SIGNIFICANT CHANGE UP (ref 10.3–14.5)
RBC # FLD: 14.3 % — SIGNIFICANT CHANGE UP (ref 10.3–14.5)
RBC BLD AUTO: SIGNIFICANT CHANGE UP
RETICS #: 3.6 K/UL — LOW (ref 25–125)
RETICS/RBC NFR: 0.2 % — LOW (ref 0.5–2.5)
SODIUM SERPL-SCNC: 135 MMOL/L — SIGNIFICANT CHANGE UP (ref 135–145)
TIBC SERPL-MCNC: 212 UG/DL — LOW (ref 220–430)
UIBC SERPL-MCNC: 20 UG/DL — LOW (ref 110–370)
VIT B12 SERPL-MCNC: 983 PG/ML — SIGNIFICANT CHANGE UP (ref 232–1245)
WBC # BLD: 13.8 K/UL — HIGH (ref 3.8–10.5)
WBC # BLD: 8.7 K/UL — SIGNIFICANT CHANGE UP (ref 3.8–10.5)
WBC # FLD AUTO: 13.8 K/UL — HIGH (ref 3.8–10.5)
WBC # FLD AUTO: 8.7 K/UL — SIGNIFICANT CHANGE UP (ref 3.8–10.5)

## 2018-10-05 PROCEDURE — 88189 FLOWCYTOMETRY/READ 16 & >: CPT

## 2018-10-05 PROCEDURE — 99223 1ST HOSP IP/OBS HIGH 75: CPT | Mod: GC

## 2018-10-05 PROCEDURE — 70450 CT HEAD/BRAIN W/O DYE: CPT | Mod: 26

## 2018-10-05 PROCEDURE — 74176 CT ABD & PELVIS W/O CONTRAST: CPT | Mod: 26

## 2018-10-05 PROCEDURE — 99232 SBSQ HOSP IP/OBS MODERATE 35: CPT

## 2018-10-05 PROCEDURE — G0452: CPT | Mod: 26

## 2018-10-05 RX ORDER — INFLUENZA VIRUS VACCINE 15; 15; 15; 15 UG/.5ML; UG/.5ML; UG/.5ML; UG/.5ML
0.5 SUSPENSION INTRAMUSCULAR ONCE
Qty: 0 | Refills: 0 | Status: COMPLETED | OUTPATIENT
Start: 2018-10-05 | End: 2018-11-20

## 2018-10-05 RX ORDER — HYDROMORPHONE HYDROCHLORIDE 2 MG/ML
1 INJECTION INTRAMUSCULAR; INTRAVENOUS; SUBCUTANEOUS ONCE
Qty: 0 | Refills: 0 | Status: DISCONTINUED | OUTPATIENT
Start: 2018-10-05 | End: 2018-10-05

## 2018-10-05 RX ORDER — DIPHENHYDRAMINE HCL 50 MG
25 CAPSULE ORAL ONCE
Qty: 0 | Refills: 0 | Status: COMPLETED | OUTPATIENT
Start: 2018-10-05 | End: 2018-10-05

## 2018-10-05 RX ORDER — ACETAMINOPHEN 500 MG
650 TABLET ORAL EVERY 6 HOURS
Qty: 0 | Refills: 0 | Status: DISCONTINUED | OUTPATIENT
Start: 2018-10-05 | End: 2018-11-20

## 2018-10-05 RX ORDER — HYDROMORPHONE HYDROCHLORIDE 2 MG/ML
1 INJECTION INTRAMUSCULAR; INTRAVENOUS; SUBCUTANEOUS EVERY 6 HOURS
Qty: 0 | Refills: 0 | Status: DISCONTINUED | OUTPATIENT
Start: 2018-10-05 | End: 2018-10-08

## 2018-10-05 RX ORDER — POSACONAZOLE 100 MG/1
300 TABLET, DELAYED RELEASE ORAL DAILY
Qty: 0 | Refills: 0 | Status: DISCONTINUED | OUTPATIENT
Start: 2018-10-05 | End: 2018-10-13

## 2018-10-05 RX ORDER — TRAMADOL HYDROCHLORIDE 50 MG/1
25 TABLET ORAL EVERY 12 HOURS
Qty: 0 | Refills: 0 | Status: DISCONTINUED | OUTPATIENT
Start: 2018-10-05 | End: 2018-10-05

## 2018-10-05 RX ORDER — PANTOPRAZOLE SODIUM 20 MG/1
8 TABLET, DELAYED RELEASE ORAL
Qty: 80 | Refills: 0 | Status: DISCONTINUED | OUTPATIENT
Start: 2018-10-05 | End: 2018-10-07

## 2018-10-05 RX ORDER — MORPHINE SULFATE 50 MG/1
2 CAPSULE, EXTENDED RELEASE ORAL EVERY 6 HOURS
Qty: 0 | Refills: 0 | Status: DISCONTINUED | OUTPATIENT
Start: 2018-10-05 | End: 2018-10-05

## 2018-10-05 RX ADMIN — HYDROMORPHONE HYDROCHLORIDE 1 MILLIGRAM(S): 2 INJECTION INTRAMUSCULAR; INTRAVENOUS; SUBCUTANEOUS at 09:32

## 2018-10-05 RX ADMIN — PANTOPRAZOLE SODIUM 10 MG/HR: 20 TABLET, DELAYED RELEASE ORAL at 08:04

## 2018-10-05 RX ADMIN — PANTOPRAZOLE SODIUM 10 MG/HR: 20 TABLET, DELAYED RELEASE ORAL at 02:49

## 2018-10-05 RX ADMIN — Medication 25 MILLIGRAM(S): at 03:11

## 2018-10-05 RX ADMIN — Medication 1 TABLET(S): at 12:32

## 2018-10-05 RX ADMIN — TRAMADOL HYDROCHLORIDE 25 MILLIGRAM(S): 50 TABLET ORAL at 02:48

## 2018-10-05 RX ADMIN — POSACONAZOLE 300 MILLIGRAM(S): 100 TABLET, DELAYED RELEASE ORAL at 15:09

## 2018-10-05 RX ADMIN — HYDROMORPHONE HYDROCHLORIDE 1 MILLIGRAM(S): 2 INJECTION INTRAMUSCULAR; INTRAVENOUS; SUBCUTANEOUS at 09:02

## 2018-10-05 RX ADMIN — HYDROMORPHONE HYDROCHLORIDE 1 MILLIGRAM(S): 2 INJECTION INTRAMUSCULAR; INTRAVENOUS; SUBCUTANEOUS at 20:49

## 2018-10-05 RX ADMIN — HYDROMORPHONE HYDROCHLORIDE 1 MILLIGRAM(S): 2 INJECTION INTRAMUSCULAR; INTRAVENOUS; SUBCUTANEOUS at 14:47

## 2018-10-05 RX ADMIN — SODIUM CHLORIDE 150 MILLILITER(S): 9 INJECTION INTRAMUSCULAR; INTRAVENOUS; SUBCUTANEOUS at 02:49

## 2018-10-05 RX ADMIN — HYDROMORPHONE HYDROCHLORIDE 1 MILLIGRAM(S): 2 INJECTION INTRAMUSCULAR; INTRAVENOUS; SUBCUTANEOUS at 21:41

## 2018-10-05 RX ADMIN — LEVETIRACETAM 500 MILLIGRAM(S): 250 TABLET, FILM COATED ORAL at 06:05

## 2018-10-05 RX ADMIN — SODIUM CHLORIDE 150 MILLILITER(S): 9 INJECTION INTRAMUSCULAR; INTRAVENOUS; SUBCUTANEOUS at 08:04

## 2018-10-05 RX ADMIN — HYDROMORPHONE HYDROCHLORIDE 1 MILLIGRAM(S): 2 INJECTION INTRAMUSCULAR; INTRAVENOUS; SUBCUTANEOUS at 14:17

## 2018-10-05 RX ADMIN — ONDANSETRON 4 MILLIGRAM(S): 8 TABLET, FILM COATED ORAL at 01:39

## 2018-10-05 RX ADMIN — TRAMADOL HYDROCHLORIDE 25 MILLIGRAM(S): 50 TABLET ORAL at 03:45

## 2018-10-05 RX ADMIN — LEVETIRACETAM 500 MILLIGRAM(S): 250 TABLET, FILM COATED ORAL at 18:57

## 2018-10-05 RX ADMIN — Medication 300 MILLIGRAM(S): at 01:39

## 2018-10-05 NOTE — PROGRESS NOTE ADULT - PROBLEM SELECTOR PLAN 5
baseline Cr 0.8 today 1.3, ?pre-renal in setting of acute blood loss anemia  check urine lytes, calculate FENA  -renally dose all medications, avoid nephrotoxins   -IVF hydration w/ NS 150cc/hr DVT ppx: contraindicated in setting of severe thrombocytopenia and SDH

## 2018-10-05 NOTE — DISCHARGE NOTE ADULT - ADDITIONAL INSTRUCTIONS
To the [facility] to see Dr. Bentley on Follow up at the [facility] to see Dr. Bentley on ___________ Follow up at the [facility] on Monday 11/26 at 1:30 pm to see QUAN Herron.  Follow up at [facility] on Thursday, 12/13 at 3:pm to see Dr. Bentley Follow up at the [facility] on Monday 11/26 at 1:30 pm to see QUAN Herron.  Follow up at [facility] on Thursday, 12/13 at 3:pm to see Dr. Bentley  You will receive a call from [facility] for appointment for bone marrow biopsy.

## 2018-10-05 NOTE — H&P ADULT - NEGATIVE NEUROLOGICAL SYMPTOMS
no loss of consciousness/no hemiparesis/no generalized seizures/no focal seizures/no confusion/no tremors/no loss of sensation/no facial palsy/no syncope/no vertigo/no transient paralysis/no paresthesias/no difficulty walking

## 2018-10-05 NOTE — H&P ADULT - NSHPPHYSICALEXAM_GEN_ALL_CORE
Vital Signs Last 24 Hrs  T(C): 37.3 (05 Oct 2018 00:45), Max: 37.3 (04 Oct 2018 17:55)  T(F): 99.1 (05 Oct 2018 00:45), Max: 99.2 (04 Oct 2018 17:55)  HR: 93 (05 Oct 2018 00:45) (81 - 93)  BP: 111/73 (05 Oct 2018 00:45) (101/55 - 118/74)  BP(mean): --  RR: 18 (05 Oct 2018 00:45) (18 - 20)  SpO2: 100% (05 Oct 2018 00:45) (99% - 100%)

## 2018-10-05 NOTE — DISCHARGE NOTE ADULT - PATIENT PORTAL LINK FT
You can access the [facility], offered by [facility], by registering with the following website: [email or web address]

## 2018-10-05 NOTE — H&P ADULT - PROBLEM SELECTOR PLAN 2
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 No focal neurologic findings on exam.  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 No focal neurologic findings on exam.  -s/p DDAVP  -transfuse platelet to goal >80  -fall precautions  -Neurosurgery following, no intervention at this time, 4Hr repeat CT not preformed in timely manner 2/2 ?logistical issues. Patient AOx4 neuro exam intact, will get repeat CT now. Spoke to [employer] fellow regarding timing of 3rd (AM) CT head, was advised to get 3rd CT 6hrs after 2AM CT head.   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 No focal neurologic findings on exam.  -s/p DDAVP  -transfuse platelet to goal >80  -fall precautions  -Neurosurgery following, no intervention at this time, 4Hr repeat CT not preformed in timely manner 2/2 ?logistical issues. Patient AOx4 neuro exam intact, will get repeat CT now. Spoke to [employer] fellow regarding timing of 3rd (AM) CT head, was advised to get 3rd CT 6hrs after 2AM CT head.   -c/w seizure ppx w/ keppra 500mg BID  -Tylenol 650mg PRN for mild/mod pain; clarified w/ [employer] fellow, Okay to use tramadol for severe pain.   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 No focal neurologic findings on exam.  -s/p DDAVP  -transfuse platelet to goal >80  -fall precautions  -Neurosurgery following, no intervention at this time, 4Hr repeat CT not preformed in timely manner 2/2 ?logistical issues. Patient AOx4 neuro exam intact, will get repeat CT now. Spoke to [employer] fellow (José Miguel MONTAÑO) regarding timing of 3rd (AM) CT head, was advised to get 3rd CT 6hrs after 2AM CT head.   -c/w seizure ppx w/ keppra 500mg BID  -Tylenol 650mg PRN for mild/mod pain; clarified w/ [employer] fellow, Okay to use tramadol for severe pain.   -neurochecks q4 hrs  -Zofran PRN In setting of relapse leukemia, platelet count 2 on arrival w/ active bleed  -s/p DDAVP, now s/p 2U platelets   -transfuse platelet to goal >80  -check HIV, hepatitis panel  -frequent h/h trend q4hrs for now.

## 2018-10-05 NOTE — H&P ADULT - NEGATIVE GENERAL SYMPTOMS
no anorexia/no polyphagia/no polyuria/no weight gain/no weight loss/no fever/no chills/no sweating/no polydipsia

## 2018-10-05 NOTE — DISCHARGE NOTE ADULT - [facility] and weekly dressing changes start of care day after discharge [phone]

## 2018-10-05 NOTE — H&P ADULT - NEGATIVE CARDIOVASCULAR SYMPTOMS
no claudication/no orthopnea/no peripheral edema/no chest pain/no paroxysmal nocturnal dyspnea/no palpitations

## 2018-10-05 NOTE — H&P ADULT - PROBLEM SELECTOR PLAN 7
IMPROVE score: 1  DVT ppx: contraindicated in setting of severe thrombocytopenia   DIET: Regular low sodium  Dr.Benziger Rosario   Internal Medicine   PGY-2   [phone] (long range pager)  [phone] (short range)

## 2018-10-05 NOTE — DISCHARGE NOTE ADULT - CARE PROVIDER_API CALL
Luke Bentley), Internal Medicine; Medical Oncology  [address] [place] [address]  Phone: [phone]  Fax: [phone]

## 2018-10-05 NOTE — PROGRESS NOTE ADULT - PROBLEM SELECTOR PLAN 4
Patient reporting daily melanotic stools, in setting of severe thrombocytopenia   -will not preform rectal exam in setting of severe thrombocytopenia   -start protonix ggt  -check occult blood, stool count   -trend h/h q4, if h/h continues to drop may need CT ab/pelvis w/ oral and IV contrast. r/o RP bleed or intraabdominal bleed DVT ppx: contraindicated in setting of severe thrombocytopenia and SDH Continue Protonix drip Follow up stool for occult blood   Continue Protonix drip

## 2018-10-05 NOTE — H&P ADULT - NEUROLOGICAL DETAILS
cranial nerves intact/normal strength/sensation intact/responds to verbal commands/alert and oriented x 3

## 2018-10-05 NOTE — PROGRESS NOTE ADULT - SUBJECTIVE AND OBJECTIVE BOX
Patient seen and examined at bedside.    T(C): 37.6 (10-05-18 @ 03:49), Max: 37.6 (10-05-18 @ 03:49)  HR: 89 (10-05-18 @ 03:49) (81 - 93)  BP: 111/76 (10-05-18 @ 03:49) (101/55 - 118/74)  RR: 18 (10-05-18 @ 03:49) (18 - 20)  SpO2: 98% (10-05-18 @ 03:49) (98% - 100%)  Wt(kg): --    Exam:  AOx3, FC, PERRL, EOMI, no facial   5/5 throughout, no drift  SILT  no clonus  multiple sites of bruising on BUE

## 2018-10-05 NOTE — H&P ADULT - PROBLEM SELECTOR PLAN 6
baseline Cr 0.8 today 1.3, ?pre-renal in setting of acute blood loss anemia  check urine lytes, calculate FENA  -renally dose all medications, avoid nephrotoxins   -IVF hydration w/ NS 150cc/hr

## 2018-10-05 NOTE — H&P ADULT - EXTREMITIES
How Severe Is Your Skin Lesion?: moderate Have Your Skin Lesions Been Treated?: not been treated Is This A New Presentation, Or A Follow-Up?: Skin Lesions Additional History: EST pt\\nNo hx\\nFbse \\nLesion of concern on face detailed exam

## 2018-10-05 NOTE — PATIENT PROFILE ADULT. - TEACHING/LEARNING LEARNING PREFERENCES
written material/group instruction/video/audio/computer/internet/skill demonstration/individual instruction/verbal instruction/pictorial

## 2018-10-05 NOTE — DISCHARGE NOTE ADULT - CARE PLAN
Principal Discharge DX:	ALL (acute lymphoblastic leukemia)  Goal:	Maintain counts and remain free from infection  Assessment and plan of treatment:	Notify MD and report to ER for any Temp greater than or equal to 100.4, intractable nausea, vomiting, diarrhea or uncontrollable bleeding Principal Discharge DX:	ALL (acute lymphoblastic leukemia)  Goal:	Maintain counts and remain free from infection  Assessment and plan of treatment:	Notify MD and report to ER for any Temp greater than or equal to 100.4, intractable nausea, vomiting, diarrhea or uncontrollable bleeding.

## 2018-10-05 NOTE — PROGRESS NOTE ADULT - ATTENDING COMMENTS
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 FISH and PCR negative. s/p Masha-Auto on 12/16/16. Pt was on sprycel maintenance. 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     - Awaiting peripheral flow results to start salvage chemotherapy - inotuzumab.  - cont ddavp and transfusions, keep plt ct >80K  - monitor mental status  - transfusion support, keep hb >7  - cont keppra px  - neutropenic, start levaquin/posa px    - mucosal bleeding now resolved, cont oral care  - monitor fobt, cont protonix gtt  - opd f/u with dr duncan

## 2018-10-05 NOTE — DISCHARGE NOTE ADULT - PLAN OF CARE
Maintain counts and remain free from infection Notify MD and report to ER for any Temp greater than or equal to 100.4, intractable nausea, vomiting, diarrhea or uncontrollable bleeding Notify MD and report to ER for any Temp greater than or equal to 100.4, intractable nausea, vomiting, diarrhea or uncontrollable bleeding.

## 2018-10-05 NOTE — DISCHARGE NOTE ADULT - MEDICATION SUMMARY - MEDICATIONS TO TAKE
I will START or STAY ON the medications listed below when I get home from the hospital:    fentaNYL 37.5 mcg/hr transdermal film, extended release  -- Apply on skin to affected area every 72 hours MDD:1 patch  -- Indication: For Pain/ headache    oxyCODONE 15 mg oral tablet  -- 1 tab(s) by mouth every 3 hours, As needed, Moderate Pain (4 - 6) or severe pain 7-10 MDD:8 tabs  -- Indication: For Pain/ headache    Cozaar 100 mg oral tablet  -- 1 tab(s) by mouth once a day  -- Indication: For HTN    levETIRAcetam 500 mg oral tablet  -- 1 tab(s) by mouth 2 times a day  -- Indication: For Seizure prophylaxis    Zofran 8 mg oral tablet  -- 1 tab(s) by mouth every 8 hours, As Needed for nausea  -- Indication: For Nausea    pantoprazole 40 mg oral delayed release tablet  -- 1 tab(s) by mouth once a day (before a meal)  -- Indication: For reflux I will START or STAY ON the medications listed below when I get home from the hospital:    oxyCODONE 15 mg oral tablet  -- 1 tab(s) by mouth every 3 hours, As needed, Moderate Pain (4 - 6) or severe pain 7-10 MDD:8 tabs  -- Indication: For Pain/ headache    fentaNYL 25 mcg/hr transdermal film, extended release  -- 1 patch by transdermal patch every 72 hours MDD:1 patch every 3 days  -- Indication: For Pain     fentaNYL 12 mcg/hr transdermal film, extended release  -- 1 patch by transdermal patch every 72 hours MDD:1 patch every 3 days  -- Indication: For Pain    Cozaar 100 mg oral tablet  -- 1 tab(s) by mouth once a day  -- Indication: For HTN    levETIRAcetam 500 mg oral tablet  -- 1 tab(s) by mouth 2 times a day  -- Indication: For Seizure prophylaxis    Zofran 8 mg oral tablet  -- 1 tab(s) by mouth every 8 hours, As Needed for nausea  -- Indication: For Nausea    pantoprazole 40 mg oral delayed release tablet  -- 1 tab(s) by mouth once a day (before a meal)  -- Indication: For reflux

## 2018-10-05 NOTE — PROGRESS NOTE ADULT - SUBJECTIVE AND OBJECTIVE BOX
Diagnosis:     Protocol/Chemo Regimen:    Day: N/A    Pt endorsed:    Review of Systems:     Pain scale:     Diet:     Allergies    No Known Drug Allergies  shellfish (Nausea; Vomiting)    Intolerances        ANTIMICROBIALS      HEME/ONC MEDICATIONS      STANDING MEDICATIONS  levETIRAcetam 500 milliGRAM(s) Oral two times a day  multivitamin 1 Tablet(s) Oral daily  pantoprazole Infusion 8 mG/Hr IV Continuous <Continuous>  sodium chloride 0.9%. 1000 milliLiter(s) IV Continuous <Continuous>      PRN MEDICATIONS  acetaminophen   Tablet .. 650 milliGRAM(s) Oral every 6 hours PRN  traMADol 25 milliGRAM(s) Oral every 12 hours PRN        Vital Signs Last 24 Hrs  T(C): 37.4 (05 Oct 2018 06:35), Max: 37.6 (05 Oct 2018 03:49)  T(F): 99.3 (05 Oct 2018 06:35), Max: 99.7 (05 Oct 2018 03:49)  HR: 82 (05 Oct 2018 06:35) (81 - 93)  BP: 114/75 (05 Oct 2018 06:35) (101/55 - 118/74)  BP(mean): --  RR: 18 (05 Oct 2018 06:35) (18 - 20)  SpO2: 98% (05 Oct 2018 06:35) (98% - 100%)    PHYSICAL EXAM  General: NAD  HEENT: PERRLA, EOMOI, clear oropharynx, anicteric sclera, pink conjunctiva  Neck: supple  CV: (+) S1/S2 RRR  Lungs: clear to auscultation, no wheezes or rales  Abdomen: soft, non-tender, non-distended (+) BS  Ext: no clubbing, cyanosis or edema  Skin: no rashes and no petechiae  Neuro: alert and oriented X 3, no focal deficits  Central Line:           LABS:                        7.1    13.8  )-----------( 84       ( 05 Oct 2018 02:10 )             20.6         Mean Cell Volume : 103.0 fl  Mean Cell Hemoglobin : 35.5 pg  Mean Cell Hemoglobin Concentration : 34.4 gm/dL  Auto Neutrophil # : 0.6 K/uL  Auto Lymphocyte # : See Note  Auto Monocyte # : 1.0 K/uL  Auto Eosinophil # : See Note  Auto Basophil # : See Note  Auto Neutrophil % : 3.0 %  Auto Lymphocyte % : 18.0 %  Auto Monocyte % : 4.0 %  Auto Eosinophil % : x  Auto Basophil % : x      10-04    136  |  104  |  16  ----------------------------<  108<H>  3.9   |  20<L>  |  1.32<H>    Ca    9.9      04 Oct 2018 19:09  Phos  3.6     10-04    TPro  6.2  /  Alb  3.9  /  TBili  0.3  /  DBili  x   /  AST  18  /  ALT  28  /  AlkPhos  137<H>  10-04      Mg --  Phos 3.6      PT/INR - ( 04 Oct 2018 20:29 )   PT: 10.6 sec;   INR: 0.97 ratio         PTT - ( 04 Oct 2018 20:29 )  PTT:26.7 sec      Uric Acid 6.4        RADIOLOGY & ADDITIONAL STUDIES:      CT Head No Cont (10.04.18 @ 19:47) >  Small bilateral acute on chronic convexity subdural hematomas, as   discussed. No midline shift or herniation.    Additional small areas of subdural hemorrhage within the left parafalcine   region with layering hemorrhage along the left tentorial leaflet, right   anterior frontal convexity, and also adjacent to the right anterior falx.    Disproportionate cerebellar atrophy which appears worsened when compared   to the prior CT examination dated 11/9/2016.    Right frontal approach Ommaya reservoir catheter in place without   evidence of hydrocephalus. Diagnosis: ALL pH(+) relapsed    Protocol/Chemo Regimen: TBD    Day: N/A    Pt endorsed: Headache     Review of Systems: Patient denied nausea, vomiting,  chest pain, cough, dyspnea, abdominal pain, constipation, diarrhea       Pain scale: 5-6/10 headache     Diet: Regular     Allergies    No Known Drug Allergies  shellfish (Nausea; Vomiting)    Intolerances    ANTIMICROBIALS  Levaquin 500 mg PO daily  Posaconazole 300 mg PO  daily       HEME/ONC MEDICATIONS      STANDING MEDICATIONS  levETIRAcetam 500 milliGRAM(s) Oral two times a day  multivitamin 1 Tablet(s) Oral daily  pantoprazole Infusion 8 mG/Hr IV Continuous <Continuous>  sodium chloride 0.9%. 1000 milliLiter(s) IV Continuous <Continuous>      PRN MEDICATIONS  acetaminophen   Tablet .. 650 milliGRAM(s) Oral every 6 hours PRN  traMADol 25 milliGRAM(s) Oral every 12 hours PRN        Vital Signs Last 24 Hrs  T(C): 37.4 (05 Oct 2018 06:35), Max: 37.6 (05 Oct 2018 03:49)  T(F): 99.3 (05 Oct 2018 06:35), Max: 99.7 (05 Oct 2018 03:49)  HR: 82 (05 Oct 2018 06:35) (81 - 93)  BP: 114/75 (05 Oct 2018 06:35) (101/55 - 118/74)  BP(mean): --  RR: 18 (05 Oct 2018 06:35) (18 - 20)  SpO2: 98% (05 Oct 2018 06:35) (98% - 100%)    PHYSICAL EXAM  General: NAD  HEENT: PERRLa, anicteric sclera, pink conjunctiva  Neck: supple  CV: (+) S1/S2 RRR  Lungs: clear to auscultation, no wheezes or rales  Abdomen: soft, non-tender, non-distended (+) BS  Ext: no clubbing, cyanosis or edema  Skin: no rashes and no petechiae  Neuro: alert and oriented X 3, no focal deficits  Central Line: Peripheral Line CDI           LABS:                        7.1    13.8  )-----------( 84       ( 05 Oct 2018 02:10 )             20.6         Mean Cell Volume : 103.0 fl  Mean Cell Hemoglobin : 35.5 pg  Mean Cell Hemoglobin Concentration : 34.4 gm/dL  Auto Neutrophil # : 0.6 K/uL  Auto Lymphocyte # : See Note  Auto Monocyte # : 1.0 K/uL  Auto Eosinophil # : See Note  Auto Basophil # : See Note  Auto Neutrophil % : 3.0 %  Auto Lymphocyte % : 18.0 %  Auto Monocyte % : 4.0 %  Auto Eosinophil % : x  Auto Basophil % : x      10-04    136  |  104  |  16  ----------------------------<  108<H>  3.9   |  20<L>  |  1.32<H>    Ca    9.9      04 Oct 2018 19:09  Phos  3.6     10-04    TPro  6.2  /  Alb  3.9  /  TBili  0.3  /  DBili  x   /  AST  18  /  ALT  28  /  AlkPhos  137<H>  10-04      Mg --  Phos 3.6      PT/INR - ( 04 Oct 2018 20:29 )   PT: 10.6 sec;   INR: 0.97 ratio         PTT - ( 04 Oct 2018 20:29 )  PTT:26.7 sec      Uric Acid 6.4        RADIOLOGY & ADDITIONAL STUDIES:      CT Head No Cont (10.04.18 @ 19:47) >  Small bilateral acute on chronic convexity subdural hematomas, as   discussed. No midline shift or herniation.    Additional small areas of subdural hemorrhage within the left parafalcine   region with layering hemorrhage along the left tentorial leaflet, right   anterior frontal convexity, and also adjacent to the right anterior falx.    Disproportionate cerebellar atrophy which appears worsened when compared   to the prior CT examination dated 11/9/2016.    Right frontal approach Ommaya reservoir catheter in place without   evidence of hydrocephalus.    CT Head No Cont (10.05.18 @ 02:24)  Grossly stable subdural hemorrhages.

## 2018-10-05 NOTE — PROGRESS NOTE ADULT - PROBLEM SELECTOR PLAN 2
Now in presumed blast crisis w/ >70% blasts.   -start allopurinol 300mg OD for prevention of TLS, IVF hydration NS 150cc/hr   -in preparation of potential chemo therapy, check HIV, hepatitis panel, Quant gold.  -Spoke to Heme fellow Jeferson pager#[phone] and made aware agree w/ current management. Plan for BM biopsy in AM. Heme to follow in AM  -Patient has taken 10/4 dose of imatinib, will defer to heme in AM on resuming. Pt is neutropenic, afebrile  Continue Levaquin and Posaconazole   If spikes pancx CXR and Change Levaquin to Cefepime

## 2018-10-05 NOTE — H&P ADULT - PROBLEM SELECTOR PLAN 4
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 FISH and PCR negative. s/p Masha-Auto on 12/16/16. Now in presumed blast crisis w/ >70% blasts.   -start allopurinol 300mg OD for prevention of TLS, IVF hydration NS 150cc/hr   -in preparation of potential chemo therapy, check HIV, hepatitis panel, Quant gold.  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 FISH and PCR negative. s/p Masha-Auto on 12/16/16. Now in presumed blast crisis w/ >70% blasts.   -start allopurinol 300mg OD for prevention of TLS, IVF hydration NS 150cc/hr   -in preparation of potential chemo therapy, check HIV, hepatitis panel, Quant gold.  -Spoke to Heme fellow Jeferson pager#[phone] and made aware agree w/ current management. Plan for BM biopsy in AM. Heme to follow in AM  -Patient has taken 10/4 dose of imatinib, will defer to heme in AM on resuming. Patient reporting daily melanotic stools, in setting of severe thrombocytopenia   -will not preform rectal exam in setting of severe thrombocytopenia   -start protonix ggt  -check occult blood, stool count   -trend h/h q4, if h/h continues to drop may need CT ab/pelvis w/ oral and IV contrast. r/o RP bleed or intraabdominal bleed

## 2018-10-05 NOTE — PROGRESS NOTE ADULT - PROBLEM SELECTOR PLAN 3
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 No focal neurologic findings on exam.  -s/p DDAVP  -transfuse platelet to goal >80  -fall precautions  -Neurosurgery following, no intervention at this time, 4Hr repeat CT not preformed in timely manner 2/2 ?logistical issues. Patient AOx4 neuro exam intact, will get repeat CT now. Spoke to [employer] fellow (José Miguel MONTAÑO) regarding timing of 3rd (AM) CT head, was advised to get 3rd CT 6hrs after 2AM CT head.   -c/w seizure ppx w/ keppra 500mg BID  -Tylenol 650mg PRN for mild/mod pain; clarified w/ [facility] fellow, Okay to use tramadol for severe pain.   -neurochecks q4 hrs  -Zofran PRN 10/4 CT  head revealed small bilateral acute on chronic convexity subdural hematomas   s/p DDAVP on 10/4  Keep platelet  >80  Neuro checks q4 hrs  Continue with  Keppra 500mg BID for seizure ppx   Neurosurgery following, no intervention at this time 10/4 CT  head revealed small bilateral acute on chronic convexity subdural hematomas, 10/5 Repeat CT head stable   s/p DDAVP on 10/4  Keep platelet  >80  Neuro checks q4 hrs  Continue with  Keppra 500mg BID for seizure ppx   Pain management   Neurosurgery following, no intervention at this time

## 2018-10-05 NOTE — H&P ADULT - NSHPLABSRESULTS_GEN_ALL_CORE
EKG: NSR, rate 81   No ST or twave changes, no blocks   Labs reviewed personally.                          9.3    39.7  )-----------( 2        ( 04 Oct 2018 19:09 )             27.6     Hgb Trend: 9.3<--  10-04    136  |  104  |  16  ----------------------------<  108<H>  3.9   |  20<L>  |  1.32<H>    Ca    9.9      04 Oct 2018 19:09  Phos  3.6     10-04    TPro  6.2  /  Alb  3.9  /  TBili  0.3  /  DBili  x   /  AST  18  /  ALT  28  /  AlkPhos  137<H>  10-04    Creatinine Trend: 1.32<--  PT/INR - ( 04 Oct 2018 20:29 )   PT: 10.6 sec;   INR: 0.97 ratio         PTT - ( 04 Oct 2018 20:29 )  PTT:26.7 sec      Imaging reviewed personally.  < from: CT Head No Cont (10.04.18 @ 19:47) >    Small bilateral acute on chronic convexity subdural hematomas, as   discussed. No midline shift or herniation.    Additional small areas of subdural hemorrhage within the left parafalcine   region with layering hemorrhage along the left tentorial leaflet, right   anterior frontal convexity, and also adjacent to the right anterior falx.    Disproportionate cerebellar atrophy which appears worsened when compared   to the prior CT examination dated 11/9/2016.    Right frontal approach Ommaya reservoir catheter in place without   evidence of hydrocephalus.    < end of copied text > Personally reviewed EKG: NSR, rate 81   No ST or twave changes, no blocks   Labs reviewed personally.                          9.3    39.7  )-----------( 2        ( 04 Oct 2018 19:09 )             27.6     Hgb Trend: 9.3<--  10-04    136  |  104  |  16  ----------------------------<  108<H>  3.9   |  20<L>  |  1.32<H>    Ca    9.9      04 Oct 2018 19:09  Phos  3.6     10-04    TPro  6.2  /  Alb  3.9  /  TBili  0.3  /  DBili  x   /  AST  18  /  ALT  28  /  AlkPhos  137<H>  10-04    Creatinine Trend: 1.32<--  PT/INR - ( 04 Oct 2018 20:29 )   PT: 10.6 sec;   INR: 0.97 ratio         PTT - ( 04 Oct 2018 20:29 )  PTT:26.7 sec      Imaging reviewed personally.  < from: CT Head No Cont (10.04.18 @ 19:47) >    Small bilateral acute on chronic convexity subdural hematomas, as   discussed. No midline shift or herniation.    Additional small areas of subdural hemorrhage within the left parafalcine   region with layering hemorrhage along the left tentorial leaflet, right   anterior frontal convexity, and also adjacent to the right anterior falx.    Disproportionate cerebellar atrophy which appears worsened when compared   to the prior CT examination dated 11/9/2016.    Right frontal approach Ommaya reservoir catheter in place without   evidence of hydrocephalus.    < end of copied text >

## 2018-10-05 NOTE — DISCHARGE NOTE ADULT - NS AS DC FOLLOWUP STROKE INST
Stroke (includes: TIA/SAH/ICH/Ischemic Stroke)/Smoking Cessation Smoking Cessation/Stroke (includes: TIA/SAH/ICH/Ischemic Stroke)/Influenza vaccination (VIS Pub Date: August 7, 2015)

## 2018-10-05 NOTE — PROGRESS NOTE ADULT - ASSESSMENT
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 FISH and PCR negative. s/p Masha-Auto on 12/16/16. 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 FISH and PCR negative. s/p Masha-Auto on 12/16/16. 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 Awaiting peripheral flow results to start salvage chemotherapy.

## 2018-10-05 NOTE — H&P ADULT - NEGATIVE ENMT SYMPTOMS
no tinnitus/no nasal obstruction/no nasal congestion/no nasal discharge/no nose bleeds/no vertigo/no sinus symptoms/no post-nasal discharge/no hearing difficulty/no ear pain

## 2018-10-05 NOTE — H&P ADULT - HISTORY OF PRESENT ILLNESS
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 FISH and PCR negative. s/p Masha-Auto on 12/16/16. Patient presented to the ER for headaches which started on Friday   Headache was sudden onset, 10/10 intensity throbbing. Located on the occipital and left paritial region. No exacerbating or relieving factors. She then developed nausea w/o vomiting and felt clammy and diaphoretic. She also endorsed SOB when walking from bed room to living room. She denies chest pain or palpations. Around Midweek, patient noticed easy bruising and dark lesions in her mouth, Also patient states that her stool has been black in color starting earlier this week. Her BMs are regular, w/o diarrhea or BrBpR.   Approx. 3 weeks ago, patient had severe 10/10 sharp/burning right abdomen pain, vesicular lesions along her right side, she was diagnosed w/ shingles and given 10 day course of antiviral. the lesions crusted over and now left w/ hyperpigmentation and pain.   In the ER: patient underwent CT head, Neurosurgery consult, given platelets and DDAVP. 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 FISH and PCR negative. s/p Masha-Auto on 12/16/16. Patient presented to the ER for headaches which started on Friday.  Headache was sudden onset, 10/10 intensity throbbing. Located on the occipital and left paritial region. No exacerbating or relieving factors. She then developed nausea w/o vomiting and felt clammy and diaphoretic. She also endorsed SOB when walking from bed room to living room. She denies chest pain or palpations. Around Midweek, patient noticed easy bruising and dark lesions in her mouth, Also patient states that her stool has been black in color starting earlier this week. Her BMs are regular, w/o diarrhea or BRBpR. Denies abdominal pain. States she's tried Tylenol and Ibuprofen for headaches.   Approx. 3 weeks ago, patient had severe 10/10 sharp/burning right abdomen pain, vesicular lesions along her right side, she was diagnosed w/ shingles and given 10 day course of antiviral. the lesions crusted over and now left w/ hyperpigmentation and pain.   In the ER: patient underwent CT head, Neurosurgery consult, given platelets and DDAVP.

## 2018-10-05 NOTE — PROGRESS NOTE ADULT - ASSESSMENT
59 yo F with pmh of ALL dx 4/2016 sp chemotherapy (last dose years ago) and ommaya.  Here with persistent HA for the past week, mild nausea.  No vomitting.  CTH showed very small bl acute on chronic sdh, no shift. Small interhemispheric and left tentorial heme. Of note PLT count 2. Was given tordal in ER.    - ddavp/plt x2-3  (goal > 80)  - cth this AM  - neurochecks   - keppra 500 bid  - medicine eval 57 yo F with pmh of ALL dx 4/2016 sp chemotherapy (last dose years ago) and ommaya.  Here with persistent HA for the past week, mild nausea.  No vomitting.  CTH showed very small bl acute on chronic sdh, no shift. Small interhemispheric and left tentorial heme. Of note PLT count 2. Was given tordal in ER.    - ddavp/plt x2-3  (goal > 80)  - cth this AM  - neurochecks   - keppra 500 bid  - medicine eval    DO NOT TAP OMMAYA GIVEN SDH

## 2018-10-05 NOTE — H&P ADULT - NSHPSOCIALHISTORY_GEN_ALL_CORE
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 Denies tobacco/etoh/recreational drug use.

## 2018-10-05 NOTE — H&P ADULT - ASSESSMENT
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 FISH and PCR negative. s/p Masha-Auto on 12/16/16. 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

## 2018-10-05 NOTE — H&P ADULT - PMH
Chronic gastritis, presence of bleeding unspecified, unspecified gastritis type    Clostridium difficile diarrhea    Essential hypertension    Herpes zoster    Intractable migraine with status migrainosus, unspecified migraine type    Leukemia    Sciatica of right side

## 2018-10-05 NOTE — H&P ADULT - ATTENDING COMMENTS
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 Confirmed HPI and ROS with patient.  Vitals reviewed and physically examined patient. Agree with resident's findings with exception of Abdominal exam: patient with + BS, soft, non tender in all four quadrants, nondistended, no rebound. Pertinent findings: Neuro: AAOx3, cranial nerves grossly intact, strength 5/5 in all extremities. Skin: bruising in UE, LE and torso.  Labs, imaging and EKG personally reviewed.  Thrombocytopenia presumably from ALL relapse along with anemia and neutropenia. S/P 2 U platelets and DDAVP in ED. 3rd Unit ordered and pending. Trend CBC. Goal Platelet >80. Goal Hgb >7. Check anemia panel. Check FOBT. Heme consult in AM. GI consult in AM in setting of melena  Subdural Hematoma: patient currently with no focal deficits. Repeat CT Head pending completion. Keppra 500 BID Neurochecks q4h  ALL concern for relapse in setting of Blast presence on differential. S/P allopurinol and initated on IV Fluids. Monitor tumor lysis labs.  Patient previously unknown to me and I was assigned to precept this case with housestaff resident, Dr. Rosario. Primary day team to assume care in AM. 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 Confirmed HPI and ROS with patient.  Vitals reviewed and physically examined patient. Agree with resident's findings with exception of Abdominal exam: patient with + BS, soft, non tender in all four quadrants, nondistended, no rebound. Pertinent findings: Neuro: AAOx3, cranial nerves grossly intact, strength 5/5 in all extremities. Skin: bruising in UE, LE and torso.  Labs, imaging and EKG personally reviewed.  Subdural Hematoma: patient currently with no focal deficits. Repeat CT Head pending completion. Keppra 500 BID Neurochecks q4h  Thrombocytopenia presumably from ALL relapse along with anemia and neutropenia. S/P 2 U platelets and DDAVP in ED. 3rd Unit ordered and pending. Trend CBC. Goal Platelet >80. Goal Hgb >7. Check anemia panel. Check FOBT. Heme consult in AM. GI consult in AM in setting of melena  ALL concern for relapse in setting of Blast presence on differential. S/P allopurinol and initated on IV Fluids. Monitor tumor lysis labs.  Remainder of plan as detailed above.  Patient previously unknown to me and I was assigned to precept this case with housestaff resident, Dr. Rosario. Primary day team to assume care in AM.

## 2018-10-05 NOTE — H&P ADULT - NEGATIVE OPHTHALMOLOGIC SYMPTOMS
no blurred vision R/no pain L/no lacrimation L/no lacrimation R/no diplopia/no blurred vision L/no pain R/no photophobia

## 2018-10-05 NOTE — PROGRESS NOTE ADULT - PROBLEM SELECTOR PLAN 1
-start allopurinol 300mg OD for prevention of TLS, IVF hydration NS 150cc/hr   -in preparation of potential chemo therapy, check HIV, hepatitis panel, Quant gold.  -Spoke to Heme fellow Jeferson pager#[phone] and made aware agree w/ current management. Plan for BM biopsy in AM. Heme to follow in AM  -Patient has taken 10/4 dose of imatinib, will defer to heme in AM on resuming. Allopurinol 300mg PO daily 10/5 follow up flow  Monitor CBC/Lytes and transfuse/replete PRN  Strict Is and Os/Daily weights/Mouth Care  IVF  Antiemetics  Allopurinol 300mg PO daily  PICC placement today

## 2018-10-05 NOTE — DISCHARGE NOTE ADULT - HOSPITAL COURSE
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 FISH and PCR negative. s/p Masha-Auto on 12/16/16. 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 Awaiting peripheral flow results to start salvage chemotherapy. 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 FISH and PCR negative. s/p Masha-Auto on 12/16/16. Patient presented to the ER for headaches which started on Friday.  Headache was sudden onset, 10/10 intensity throbbing. Located on the occipital and left paritial region. No exacerbating or relieving factors. She then developed nausea w/o vomiting and felt clammy and diaphoretic. She also endorsed SOB when walking from bed room to living room. She denies chest pain or palpations. Around Midweek, patient noticed easy bruising and dark lesions in her mouth, Also patient states that her stool has been black in color starting earlier this week. Her BMs are regular, w/o diarrhea or BRBpR. Denies abdominal pain. States she's tried Tylenol and Ibuprofen for headaches.   Approx. 3 weeks ago, patient had severe 10/10 sharp/burning right abdomen pain, vesicular lesions along her right side, she was diagnosed w/ shingles and given 10 day course of antiviral. the lesions crusted over and now left w/ hyperpigmentation and pain.   CT Head showed SDH and Neurosurgery was consulted. Repeat imaging was recommended as well as keeping platelets over 80K. 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 A PICC line was placed for chemotherapy and was initiated with Inotuzamab on Day 1, 5 and 8. IVF and Allopurinol for TLS. The patient was reciving platelets without significant increase in count. Per blood bank platelets were to be given in half units over 3 hours. HLA would be given when availible.  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 FISH and PCR negative. s/p Masha-Auto on 12/16/16. Patient presented to the ER for headaches which started on Friday.  Headache was sudden onset, 10/10 intensity throbbing. Located on the occipital and left paritial region. No exacerbating or relieving factors. She then developed nausea w/o vomiting and felt clammy and diaphoretic. She also endorsed SOB when walking from bed room to living room. She denies chest pain or palpations. Around Midweek, patient noticed easy bruising and dark lesions in her mouth, Also patient states that her stool has been black in color starting earlier this week. Her BMs are regular, w/o diarrhea or BRBpR. Denies abdominal pain. States she's tried Tylenol and Ibuprofen for headaches.   Approx. 3 weeks ago, patient had severe 10/10 sharp/burning right abdomen pain, vesicular lesions along her right side, she was diagnosed w/ shingles and given 10 day course of antiviral. the lesions crusted over and now left w/ hyperpigmentation and pain.   CT Head showed SDH and Neurosurgery was consulted. Repeat imaging was recommended as well as keeping platelets over 80K. 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 A PICC line was placed for chemotherapy and was initiated with Inotuzamab on Day 1, 5 and 8. IVF and Allopurinol for TLS. The patient was reciving platelets without significant increase in count. Per blood bank platelets were to be given in half units over 3 hours. HLA would be given when availible.  Follow up CT Head on 10/11 was stable and the patient continued to receive 1/2 units of platelets over 3 hours every 12 hours as previously ordered. 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 FISH and PCR negative. s/p Masha-Auto on 12/16/16. Patient presented to the ER for headaches which started on Friday.  Headache was sudden onset, 10/10 intensity throbbing. Located on the occipital and left paritial region. No exacerbating or relieving factors. She then developed nausea w/o vomiting and felt clammy and diaphoretic. She also endorsed SOB when walking from bed room to living room. She denies chest pain or palpations. Around Midweek, patient noticed easy bruising and dark lesions in her mouth, Also patient states that her stool has been black in color starting earlier this week. Her BMs are regular, w/o diarrhea or BRBpR. Denies abdominal pain. States she's tried Tylenol and Ibuprofen for headaches.   Approx. 3 weeks ago, patient had severe 10/10 sharp/burning right abdomen pain, vesicular lesions along her right side, she was diagnosed w/ shingles and given 10 day course of antiviral. the lesions crusted over and now left w/ hyperpigmentation and pain.   CT Head showed SDH and Neurosurgery was consulted. Repeat imaging was recommended as well as keeping platelets over 80K. 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 A PICC line was placed for chemotherapy and was initiated with Inotuzamab on Day 1, 8 and 15. IVF and Allopurinol for TLS. The patient was reciving platelets without significant increase in count. Per blood bank platelets were to be given in half units over 3 hours. HLA would be given when availible.  Follow up CT Head on 10/11 was stable and the patient continued to receive 1/2 units of platelets over 3 hours every 12 hours as previously ordered. 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 FISH and PCR negative. s/p Masha-Auto on 12/16/16. Patient presented to the ER for headaches which started on Friday.  Headache was sudden onset, 10/10 intensity throbbing. Located on the occipital and left paritial region. No exacerbating or relieving factors. She then developed nausea w/o vomiting and felt clammy and diaphoretic. She also endorsed SOB when walking from bed room to living room. She denies chest pain or palpations. Around Midweek, patient noticed easy bruising and dark lesions in her mouth, Also patient states that her stool has been black in color starting earlier this week. Her BMs are regular, w/o diarrhea or BRBpR. Denies abdominal pain. States she's tried Tylenol and Ibuprofen for headaches.   Approx. 3 weeks ago, patient had severe 10/10 sharp/burning right abdomen pain, vesicular lesions along her right side, she was diagnosed w/ shingles and given 10 day course of antiviral. the lesions crusted over and now left w/ hyperpigmentation and pain.   CT Head showed SDH and Neurosurgery was consulted. Repeat imaging was recommended as well as keeping platelets over 80K. 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 A PICC line was placed for chemotherapy and was initiated with Inotuzamab on Day 1, 8 and 15. IVF and Allopurinol for TLS. The patient was reciving platelets without significant increase in count. Per blood bank platelets were to be given in half units over 3 hours. HLA would be given when availible.  Follow up CT Head on 10/11 was stable and the patient continued to receive 1/2 units of platelets over 3 hours every 12 hours as previously ordered. 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 FISH and PCR negative. s/p Masha-Auto on 12/16/16. Patient presented to the ER for headaches which started on Friday.  Headache was sudden onset, 10/10 intensity throbbing. Located on the occipital and left paritial region. No exacerbating or relieving factors. She then developed nausea w/o vomiting and felt clammy and diaphoretic. She also endorsed SOB when walking from bed room to living room. She denies chest pain or palpations. Around Midweek, patient noticed easy bruising and dark lesions in her mouth, Also patient states that her stool has been black in color starting earlier this week. Her BMs are regular, w/o diarrhea or BRBpR. Denies abdominal pain. States she's tried Tylenol and Ibuprofen for headaches.   Approx. 3 weeks ago, patient had severe 10/10 sharp/burning right abdomen pain, vesicular lesions along her right side, she was diagnosed w/ shingles and given 10 day course of antiviral. the lesions crusted over and now left w/ hyperpigmentation and pain.   CT Head showed SDH and Neurosurgery was consulted. Repeat imaging was recommended as well as keeping platelets over 80K. 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 A PICC line was placed for chemotherapy and was initiated with Inotuzamab on Day 1, 8 and 15. IVF and Allopurinol for TLS. The patient was reciving platelets without significant increase in count. Per blood bank platelets were to be given in half units over 3 hours. HLA would be given when availible.  Follow up CT Head on 10/11 was stable and the patient continued to receive 1/2 units of platelets over 3 hours every 12 hours as previously ordered. The patient had fever and mild transaminitis after receiving second dose of Inotuzamab. IV Vancomycin was initiated and CT C/A/P was completed for occult source of fever and showed ________. 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 FISH and PCR negative. s/p Masha-Auto on 12/16/16. Patient presented to the ER for headaches which started on Friday.  Headache was sudden onset, 10/10 intensity throbbing. Located on the occipital and left paritial region. No exacerbating or relieving factors. She then developed nausea w/o vomiting and felt clammy and diaphoretic. She also endorsed SOB when walking from bed room to living room. She denies chest pain or palpations. Around Midweek, patient noticed easy bruising and dark lesions in her mouth, Also patient states that her stool has been black in color starting earlier this week. Her BMs are regular, w/o diarrhea or BRBpR. Denies abdominal pain. States she's tried Tylenol and Ibuprofen for headaches.   Approx. 3 weeks ago, patient had severe 10/10 sharp/burning right abdomen pain, vesicular lesions along her right side, she was diagnosed w/ shingles and given 10 day course of antiviral. the lesions crusted over and now left w/ hyperpigmentation and pain.   CT Head showed SDH and Neurosurgery was consulted. Repeat imaging was recommended as well as keeping platelets over 80K. 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 A PICC line was placed for chemotherapy and was initiated with Inotuzamab on Day 1, 8 and 15. IVF and Allopurinol for TLS. The patient was reciving platelets without significant increase in count. Per blood bank platelets were to be given in half units over 3 hours. HLA would be given when availible.  Follow up CT Head on 10/11 was stable and the patient continued to receive 1/2 units of platelets over 3 hours every 12 hours as previously ordered. The patient had fever and mild transaminitis after receiving second dose of Inotuzamab. IV Vancomycin was initiated and CT C/A/P was completed for occult source of fever and showed PNA with previously known small pericardial effusion and gallstones. Cefepime was broadened to Meropenem for persistent fevers and CT findings. Repeat CT Head also was completed and showed decreased size of SDH. Neuro Sx was asked when Omaya could be tapped again and recommendations were at a minimum of 50K (ideally 80-100K) to ensure SDH did not expand. HLA platelets were given on 10/17 and post platelet count increased from 3K to 16K and then 1/2 bag was given 12 hours later. 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 FISH and PCR negative. s/p Masha-Auto on 12/16/16. Patient presented to the ER for headaches which started on Friday.  Headache was sudden onset, 10/10 intensity throbbing. Located on the occipital and left paritial region. No exacerbating or relieving factors. She then developed nausea w/o vomiting and felt clammy and diaphoretic. She also endorsed SOB when walking from bed room to living room. She denies chest pain or palpations. Around Midweek, patient noticed easy bruising and dark lesions in her mouth, Also patient states that her stool has been black in color starting earlier this week. Her BMs are regular, w/o diarrhea or BRBPR. Denies abdominal pain. States she's tried Tylenol and Ibuprofen for headaches.   Approx. 3 weeks ago, patient had severe 10/10 sharp/burning right abdomen pain, vesicular lesions along her right side, she was diagnosed w/ shingles and given 10 day course of antiviral. the lesions crusted over and now left w/ hyperpigmentation and pain.   CT Head showed SDH and Neurosurgery was consulted. Repeat imaging was recommended as well as keeping platelets over 80K. 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 A PICC line was placed for chemotherapy and was initiated with Inotuzamab on Day 1, 8 and 15. IVF and Allopurinol for TLS. The patient was reciving platelets without significant increase in count. Per blood bank platelets were to be given in half units over 3 hours. HLA would be given when availible.  Follow up CT Head on 10/11 was stable and the patient continued to receive 1/2 units of platelets over 3 hours every 12 hours as previously ordered. The patient had fever and mild transaminitis after receiving second dose of Inotuzamab. IV Vancomycin was initiated and CT C/A/P was completed for occult source of fever and showed PNA with previously known small pericardial effusion and gallstones. Cefepime was broadened to Meropenem for persistent fevers and CT findings. Repeat CT Head also was completed and showed decreased size of SDH. Neuro Sx was asked when Omaya could be tapped again and recommendations were at a minimum of 50K (ideally 80-100K) to ensure SDH did not expand. HLA platelets were given on 10/17 and post platelet count increased from 3K to 16K and then 1/2 bag was given 12 hours later.  The patient received last dose on Inotuzamab Cycle 1 on 10/22. ID was consulted to evaluate prior CT Chest results with ground-glass opacities and need for possible antifungal coverage at discharge. 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 FISH and PCR negative. s/p Masha-Auto on 12/16/16. Patient presented to the ER for headaches which started on Friday.  Headache was sudden onset, 10/10 intensity throbbing. Located on the occipital and left paritial region. No exacerbating or relieving factors. She then developed nausea w/o vomiting and felt clammy and diaphoretic. She also endorsed SOB when walking from bed room to living room. She denies chest pain or palpations. Around Midweek, patient noticed easy bruising and dark lesions in her mouth, Also patient states that her stool has been black in color starting earlier this week. Her BMs are regular, w/o diarrhea or BRBPR. Denies abdominal pain. States she's tried Tylenol and Ibuprofen for headaches.   Approx. 3 weeks ago, patient had severe 10/10 sharp/burning right abdomen pain, vesicular lesions along her right side, she was diagnosed w/ shingles and given 10 day course of antiviral. the lesions crusted over and now left w/ hyperpigmentation and pain.   CT Head showed SDH and Neurosurgery was consulted. Repeat imaging was recommended as well as keeping platelets over 80K. 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 A PICC line was placed for chemotherapy and was initiated with Inotuzamab on Day 1, 8 and 15. IVF and Allopurinol for TLS. The patient was reciving platelets without significant increase in count. Per blood bank platelets were to be given in half units over 3 hours. HLA would be given when availible.  Follow up CT Head on 10/11 was stable and the patient continued to receive 1/2 units of platelets over 3 hours every 12 hours as previously ordered. The patient had fever and mild transaminitis after receiving second dose of Inotuzamab. IV Vancomycin was initiated and CT C/A/P was completed for occult source of fever and showed PNA with previously known small pericardial effusion and gallstones. Cefepime was broadened to Meropenem for persistent fevers and CT findings. Repeat CT Head also was completed and showed decreased size of SDH. Neuro Sx was asked when Omaya could be tapped again and recommendations were at a minimum of 50K (ideally 80-100K) to ensure SDH did not expand. HLA platelets were given on 10/17 and post platelet count increased from 3K to 16K and then 1/2 bag was given 12 hours later.  The patient received last dose on Inotuzamab Cycle 1 on 10/22. ID was consulted to evaluate prior CT Chest results with ground-glass opacities and need for possible antifungal coverage at discharge. Ct scan was reviewed by ID and and did not look fungal in nature. IV Vancomycin was discontinued and Mycamine remained for ppx (No azoles with Inotuzamab).  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 FISH and PCR negative. s/p Masha-Auto on 12/16/16. Patient presented to the ER for headaches which started on Friday.  Headache was sudden onset, 10/10 intensity throbbing. Located on the occipital and left paritial region. No exacerbating or relieving factors. She then developed nausea w/o vomiting and felt clammy and diaphoretic. She also endorsed SOB when walking from bed room to living room. She denies chest pain or palpations. Around Midweek, patient noticed easy bruising and dark lesions in her mouth, Also patient states that her stool has been black in color starting earlier this week. Her BMs are regular, w/o diarrhea or BRBPR. Denies abdominal pain. States she's tried Tylenol and Ibuprofen for headaches.   Approx. 3 weeks ago, patient had severe 10/10 sharp/burning right abdomen pain, vesicular lesions along her right side, she was diagnosed w/ shingles and given 10 day course of antiviral. the lesions crusted over and now left w/ hyperpigmentation and pain.   CT Head showed SDH and Neurosurgery was consulted. Repeat imaging was recommended as well as keeping platelets over 80K. 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 A PICC line was placed for chemotherapy and was initiated with Inotuzamab on Day 1, 8 and 15. IVF and Allopurinol for TLS. The patient was reciving platelets without significant increase in count. Per blood bank platelets were to be given in half units over 3 hours. HLA would be given when availible.  Follow up CT Head on 10/11 was stable and the patient continued to receive 1/2 units of platelets over 3 hours every 12 hours as previously ordered. The patient had fever and mild transaminitis after receiving second dose of Inotuzamab. IV Vancomycin was initiated and CT C/A/P was completed for occult source of fever and showed PNA with previously known small pericardial effusion and gallstones. Cefepime was broadened to Meropenem for persistent fevers and CT findings. Repeat CT Head also was completed and showed decreased size of SDH. Neuro Sx was asked when Omaya could be tapped again and recommendations were at a minimum of 50K (ideally 80-100K) to ensure SDH did not expand. HLA platelets were given on 10/17 and post platelet count increased from 3K to 16K and then 1/2 bag was given 12 hours later.  The patient received last dose on Inotuzamab Cycle 1 on 10/22. ID was consulted to evaluate prior CT Chest results with ground-glass opacities and need for possible antifungal coverage at discharge. CT scan was reviewed by ID and and did not look fungal in nature. IV Vancomycin was discontinued and Mycamine remained for ppx (No azoles with Inotuzamab).  Repeat CT Head was completed on 10/23 for follow up and showed some new bleeding into previous collection. Findings were discussed with Neuro Sx and repeat CT Head was ordered for 6 hours later. 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 FISH and PCR negative. s/p Masha-Auto on 12/16/16. Patient presented to the ER for headaches which started on Friday.  Headache was sudden onset, 10/10 intensity throbbing. Located on the occipital and left paritial region. No exacerbating or relieving factors. She then developed nausea w/o vomiting and felt clammy and diaphoretic. She also endorsed SOB when walking from bed room to living room. She denies chest pain or palpations. Around Midweek, patient noticed easy bruising and dark lesions in her mouth, Also patient states that her stool has been black in color starting earlier this week. Her BMs are regular, w/o diarrhea or BRBPR. Denies abdominal pain. States she's tried Tylenol and Ibuprofen for headaches.   Approx. 3 weeks ago, patient had severe 10/10 sharp/burning right abdomen pain, vesicular lesions along her right side, she was diagnosed w/ shingles and given 10 day course of antiviral. the lesions crusted over and now left w/ hyperpigmentation and pain.   CT Head showed SDH and Neurosurgery was consulted. Repeat imaging was recommended as well as keeping platelets over 80K. 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 A PICC line was placed for chemotherapy and was initiated with Inotuzamab on Day 1, 8 and 15. IVF and Allopurinol for TLS. The patient was reciving platelets without significant increase in count. Per blood bank platelets were to be given in half units over 3 hours. HLA would be given when availible.  Follow up CT Head on 10/11 was stable and the patient continued to receive 1/2 units of platelets over 3 hours every 12 hours as previously ordered. The patient had fever and mild transaminitis after receiving second dose of Inotuzamab. IV Vancomycin was initiated and CT C/A/P was completed for occult source of fever and showed PNA with previously known small pericardial effusion and gallstones. Cefepime was broadened to Meropenem for persistent fevers and CT findings. Repeat CT Head also was completed and showed decreased size of SDH. Neuro Sx was asked when Omaya could be tapped again and recommendations were at a minimum of 50K (ideally 80-100K) to ensure SDH did not expand. HLA platelets were given on 10/17 and post platelet count increased from 3K to 16K and then 1/2 bag was given 12 hours later.  The patient received last dose on Inotuzamab Cycle 1 on 10/22. ID was consulted to evaluate prior CT Chest results with ground-glass opacities and need for possible antifungal coverage at discharge. CT scan was reviewed by ID and and did not look fungal in nature. IV Vancomycin was discontinued and Mycamine remained for ppx (No azoles with Inotuzamab).  Repeat CT Head was completed on 10/23 for follow up and showed some new bleeding into previous collection. Findings were discussed with Neuro Sx and repeat CT Head was ordered for 6 hours later. 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 FISH and PCR negative. s/p Masha-Auto on 12/16/16. Patient presented to the ER for headaches which started on Friday.  Headache was sudden onset, 10/10 intensity throbbing. Located on the occipital and left paritial region. No exacerbating or relieving factors. She then developed nausea w/o vomiting and felt clammy and diaphoretic. She also endorsed SOB when walking from bed room to living room. She denies chest pain or palpations. Around Midweek, patient noticed easy bruising and dark lesions in her mouth, Also patient states that her stool has been black in color starting earlier this week. Her BMs are regular, w/o diarrhea or BRBPR. Denies abdominal pain. States she's tried Tylenol and Ibuprofen for headaches.   Approx. 3 weeks ago, patient had severe 10/10 sharp/burning right abdomen pain, vesicular lesions along her right side, she was diagnosed w/ shingles and given 10 day course of antiviral. the lesions crusted over and now left w/ hyperpigmentation and pain.   CT Head showed SDH and Neurosurgery was consulted. Repeat imaging was recommended as well as keeping platelets over 80K. 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 A PICC line was placed for chemotherapy and was initiated with Inotuzamab on Day 1, 8 and 15. IVF and Allopurinol for TLS. The patient was reciving platelets without significant increase in count. Per blood bank platelets were to be given in half units over 3 hours. HLA would be given when availible.  Follow up CT Head on 10/11 was stable and the patient continued to receive 1/2 units of platelets over 3 hours every 12 hours as previously ordered. The patient had fever and mild transaminitis after receiving second dose of Inotuzamab. IV Vancomycin was initiated and CT C/A/P was completed for occult source of fever and showed PNA with previously known small pericardial effusion and gallstones. Cefepime was broadened to Meropenem for persistent fevers and CT findings. Repeat CT Head also was completed and showed decreased size of SDH. Neuro Sx was asked when Omaya could be tapped again and recommendations were at a minimum of 50K (ideally 80-100K) to ensure SDH did not expand. HLA platelets were given on 10/17 and post platelet count increased from 3K to 16K and then 1/2 bag was given 12 hours later.  The patient received last dose on Inotuzamab Cycle 1 on 10/22. ID was consulted to evaluate prior CT Chest results with ground-glass opacities and need for possible antifungal coverage at discharge. CT scan was reviewed by ID and and did not look fungal in nature. IV Vancomycin was discontinued and Mycamine remained for ppx (No azoles with Inotuzamab).  Repeat CT Head was completed on 10/23 for follow up and showed some new bleeding into previous collection. Findings were discussed with Neuro Sx and repeat CT Head was ordered for 6 hours later which ws unchanged. HLA platelets were administered when available. 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 FISH and PCR negative. s/p Masha-Auto on 12/16/16. Patient presented to the ER for headaches which started on Friday.  Headache was sudden onset, 10/10 intensity throbbing. Located on the occipital and left paritial region. No exacerbating or relieving factors. She then developed nausea w/o vomiting and felt clammy and diaphoretic. She also endorsed SOB when walking from bed room to living room. She denies chest pain or palpations. Around Midweek, patient noticed easy bruising and dark lesions in her mouth, Also patient states that her stool has been black in color starting earlier this week. Her BMs are regular, w/o diarrhea or BRBPR. Denies abdominal pain. States she's tried Tylenol and Ibuprofen for headaches.   Approx. 3 weeks ago, patient had severe 10/10 sharp/burning right abdomen pain, vesicular lesions along her right side, she was diagnosed w/ shingles and given 10 day course of antiviral. the lesions crusted over and now left w/ hyperpigmentation and pain.   CT Head showed SDH and Neurosurgery was consulted. Repeat imaging was recommended as well as keeping platelets over 80K. 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 A PICC line was placed for chemotherapy and was initiated with Inotuzamab on Day 1, 8 and 15. IVF and Allopurinol for TLS. The patient was reciving platelets without significant increase in count. Per blood bank platelets were to be given in half units over 3 hours. HLA would be given when availible.  Follow up CT Head on 10/11 was stable and the patient continued to receive 1/2 units of platelets over 3 hours every 12 hours as previously ordered. The patient had fever and mild transaminitis after receiving second dose of Inotuzamab. IV Vancomycin was initiated and CT C/A/P was completed for occult source of fever and showed PNA with previously known small pericardial effusion and gallstones. Cefepime was broadened to Meropenem for persistent fevers and CT findings. Repeat CT Head also was completed and showed decreased size of SDH. Neuro Sx was asked when Omaya could be tapped again and recommendations were at a minimum of 50K (ideally 80-100K) to ensure SDH did not expand. HLA platelets were given on 10/17 and post platelet count increased from 3K to 16K and then 1/2 bag was given 12 hours later.  The patient received last dose on Inotuzamab Cycle 1 on 10/22. ID was consulted to evaluate prior CT Chest results with ground-glass opacities and need for possible antifungal coverage at discharge. CT scan was reviewed by ID and and did not look fungal in nature. IV Vancomycin was discontinued and Mycamine remained for ppx (No azoles with Inotuzamab).  Repeat CT Head was completed on 10/23 for follow up and showed some new bleeding into previous collection. Findings were discussed with Neuro Sx and repeat CT Head was ordered for 6 hours later which ws unchanged. HLA platelets were administered when available for refractory thrombocytopenia. 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 FISH and PCR negative. s/p Masha-Auto on 12/16/16. Patient presented to the ER for headaches.  Headache was sudden onset, 10/10 intensity throbbing. Located on the occipital and left paritial region. No exacerbating or relieving factors. She then developed nausea w/o vomiting and felt clammy and diaphoretic. She also endorsed SOB when walking from bed room to living room. She denies chest pain or palpations. Around Midweek, patient noticed easy bruising and dark lesions in her mouth, Also patient states that her stool has been black in color starting earlier this week. Her BMs are regular, w/o diarrhea or BRBPR. Denies abdominal pain. States she's tried Tylenol and Ibuprofen for headaches.   Approx. 3 weeks ago, patient had severe 10/10 sharp/burning right abdomen pain, vesicular lesions along her right side, she was diagnosed w/ shingles and given 10 day course of antiviral. the lesions crusted over and now left w/ hyperpigmentation and pain.   CT Head showed SDH and Neurosurgery was consulted. Repeat imaging was recommended as well as keeping platelets over 80K. 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 A PICC line was placed for chemotherapy and was initiated with Inotuzamab on Day 1, 8 and 15. IVF and Allopurinol for TLS. The patient was reciving platelets without significant increase in count. Per blood bank platelets were to be given in half units over 3 hours. HLA would be given when availible.  Follow up CT Head on 10/11 was stable and the patient continued to receive 1/2 units of platelets over 3 hours every 12 hours as previously ordered. The patient had fever and mild transaminitis after receiving second dose of Inotuzamab. IV Vancomycin was initiated and CT C/A/P was completed for occult source of fever and showed PNA with previously known small pericardial effusion and gallstones. Cefepime was broadened to Meropenem for persistent fevers and CT findings. Repeat CT Head also was completed and showed decreased size of SDH. Neuro Sx was asked when Wilmar could be tapped again and recommendations were at a minimum of 50K (ideally 80-100K) to ensure SDH did not expand. HLA platelets were given on 10/17 and post platelet count increased from 3K to 16K and then 1/2 bag was given 12 hours later.  The patient received last dose on Inotuzamab Cycle 1 on 10/22. ID was consulted to evaluate prior CT Chest results with ground-glass opacities and need for possible antifungal coverage at discharge. CT scan was reviewed by ID and and did not look fungal in nature. IV Vancomycin was discontinued and Mycamine remained for ppx (No azoles with Inotuzamab). Cycle 2 Inotuzumab started 11/6 for  Days 1, 8, 15.  Repeat CT Head was completed on 10/23 for follow up and showed some new bleeding into previous collection. Findings were discussed with Neuro Sx and repeat CT Head was ordered for 6 hours later which ws unchanged. HLA platelets were administered when available for refractory thrombocytopenia. Patient again had severe headache on 11/3, repeat CT showed nearly resolved hemorrhage. 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 FISH and PCR negative. s/p Masha-Auto on 12/16/16. Patient presented to the ER for headaches.  Headache was sudden onset, 10/10 intensity throbbing. Located on the occipital and left paritial region. No exacerbating or relieving factors. She then developed nausea w/o vomiting and felt clammy and diaphoretic. She also endorsed SOB when walking from bed room to living room. She denies chest pain or palpations. Around Midweek, patient noticed easy bruising and dark lesions in her mouth, Also patient states that her stool has been black in color starting earlier this week. Her BMs are regular, w/o diarrhea or BRBPR. Denies abdominal pain. States she's tried Tylenol and Ibuprofen for headaches.   Approx. 3 weeks ago, patient had severe 10/10 sharp/burning right abdomen pain, vesicular lesions along her right side, she was diagnosed w/ shingles and given 10 day course of antiviral. the lesions crusted over and now left w/ hyperpigmentation and pain.   CT Head showed SDH and Neurosurgery was consulted. Repeat imaging was recommended as well as keeping platelets over 80K. 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 A PICC line was placed for chemotherapy and was initiated with Inotuzamab on Day 1, 8 and 15. IVF and Allopurinol for TLS. The patient was reciving platelets without significant increase in count. Per blood bank platelets were to be given in half units over 3 hours. HLA would be given when availible.  Follow up CT Head on 10/11 was stable and the patient continued to receive 1/2 units of platelets over 3 hours every 12 hours as previously ordered. The patient had fever and mild transaminitis after receiving second dose of Inotuzamab. IV Vancomycin was initiated and CT C/A/P was completed for occult source of fever and showed PNA with previously known small pericardial effusion and gallstones. Cefepime was broadened to Meropenem for persistent fevers and CT findings. Repeat CT Head also was completed and showed decreased size of SDH. Neuro Sx was asked when Wilmar could be tapped again and recommendations were at a minimum of 50K (ideally 80-100K) to ensure SDH did not expand. HLA platelets were given on 10/17 and post platelet count increased from 3K to 16K and then 1/2 bag was given 12 hours later.  The patient received last dose on Inotuzamab Cycle 1 on 10/22. ID was consulted to evaluate prior CT Chest results with ground-glass opacities and need for possible antifungal coverage at discharge. CT scan was reviewed by ID and and did not look fungal in nature. IV Vancomycin was discontinued and Mycamine remained for ppx (No azoles with Inotuzamab). Cycle 2 Inotuzumab started 11/6 for  Days 1, 8, 15. Patient for possible future Haploidentical Allogeneic stem cell transplant after discharge. Pre-BMT testing ordered and  (completed) prior to discharge: Echo, MUGA, Xray sinuses, 24-Hr Urine for Creatinine.  Repeat CT Head was completed on 10/23 for follow up and showed some new bleeding into previous collection. Findings were discussed with Neuro Sx and repeat CT Head was ordered for 6 hours later which ws unchanged. HLA platelets were administered when available for refractory thrombocytopenia. Patient again had severe headache on 11/3, repeat CT showed nearly resolved hemorrhage. 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 FISH and PCR negative. s/p Masha-Auto on 12/16/16. Patient presented to the ER for headaches.  Headache was sudden onset, 10/10 intensity throbbing. Located on the occipital and left paritial region. No exacerbating or relieving factors. She then developed nausea w/o vomiting and felt clammy and diaphoretic. She also endorsed SOB when walking from bed room to living room. She denies chest pain or palpations. Around Midweek, patient noticed easy bruising and dark lesions in her mouth, Also patient states that her stool has been black in color starting earlier this week. Her BMs are regular, w/o diarrhea or BRBPR. Denies abdominal pain. States she's tried Tylenol and Ibuprofen for headaches.   Approx. 3 weeks ago, patient had severe 10/10 sharp/burning right abdomen pain, vesicular lesions along her right side, she was diagnosed w/ shingles and given 10 day course of antiviral. the lesions crusted over and now left w/ hyperpigmentation and pain.   CT Head showed SDH and Neurosurgery was consulted. Repeat imaging was recommended as well as keeping platelets over 80K. 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 A PICC line was placed for chemotherapy and was initiated with Inotuzamab on Day 1, 8 and 15. IVF and Allopurinol for TLS. The patient was reciving platelets without significant increase in count. Per blood bank platelets were to be given in half units over 3 hours. HLA would be given when availible.  Follow up CT Head on 10/11 was stable and the patient continued to receive 1/2 units of platelets over 3 hours every 12 hours as previously ordered. The patient had fever and mild transaminitis after receiving second dose of Inotuzamab. IV Vancomycin was initiated and CT C/A/P was completed for occult source of fever and showed PNA with previously known small pericardial effusion and gallstones. Cefepime was broadened to Meropenem for persistent fevers and CT findings. Repeat CT Head also was completed and showed decreased size of SDH. Neuro Sx was asked when Wilmar could be tapped again and recommendations were at a minimum of 50K (ideally 80-100K) to ensure SDH did not expand. HLA platelets were given on 10/17 and post platelet count increased from 3K to 16K and then 1/2 bag was given 12 hours later.  The patient received last dose on Inotuzamab Cycle 1 on 10/22. ID was consulted to evaluate prior CT Chest results with ground-glass opacities and need for possible antifungal coverage at discharge. CT scan was reviewed by ID and and did not look fungal in nature. IV Vancomycin was discontinued and Mycamine remained for ppx (No azoles with Inotuzamab). Cycle 2 Inotuzumab started 11/6 for  Days 1, 8, 15. Patient for possible future Haploidentical Allogeneic stem cell transplant after discharge. Pre-BMT testing ordered and  (completed) prior to discharge: Echo, MUGA, Xray sinuses, 24-Hr Urine for Creatinine.  Repeat CT Head was completed on 10/23 for follow up and showed some new bleeding into previous collection. Findings were discussed with Neuro Sx and repeat CT Head was ordered for 6 hours later which ws unchanged. HLA platelets were administered when available for refractory thrombocytopenia. Patient again had severe headache on 11/3 and 11/15, repeat CTs showed nearly resolved hemorrhage. 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 FISH and PCR negative. s/p Masha-Auto on 12/16/16. Patient presented to the ER for headaches.  Headache was sudden onset, 10/10 intensity throbbing. Located on the occipital and left paritial region. No exacerbating or relieving factors. She then developed nausea w/o vomiting and felt clammy and diaphoretic. She also endorsed SOB when walking from bed room to living room. She denies chest pain or palpations. Around Midweek, patient noticed easy bruising and dark lesions in her mouth, Also patient states that her stool has been black in color starting earlier this week. Her BMs are regular, w/o diarrhea or BRBPR. Denies abdominal pain. States she's tried Tylenol and Ibuprofen for headaches.   Approx. 3 weeks ago, patient had severe 10/10 sharp/burning right abdomen pain, vesicular lesions along her right side, she was diagnosed w/ shingles and given 10 day course of antiviral. the lesions crusted over and now left w/ hyperpigmentation and pain.   CT Head showed SDH and Neurosurgery was consulted. Repeat imaging was recommended as well as keeping platelets over 80K. 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 A PICC line was placed for chemotherapy and was initiated with Inotuzamab on Day 1, 8 and 15. IVF and Allopurinol for TLS. The patient was reciving platelets without significant increase in count. Per blood bank platelets were to be given in half units over 3 hours. HLA would be given when availible.  Follow up CT Head on 10/11 was stable and the patient continued to receive 1/2 units of platelets over 3 hours every 12 hours as previously ordered. The patient had fever and mild transaminitis after receiving second dose of Inotuzamab. IV Vancomycin was initiated and CT C/A/P was completed for occult source of fever and showed PNA with previously known small pericardial effusion and gallstones. Cefepime was broadened to Meropenem for persistent fevers and CT findings. Repeat CT Head also was completed and showed decreased size of SDH. Neuro Sx was asked when Wilmar could be tapped again and recommendations were at a minimum of 50K (ideally 80-100K) to ensure SDH did not expand. HLA platelets were given on 10/17 and post platelet count increased from 3K to 16K and then 1/2 bag was given 12 hours later.  The patient received last dose on Inotuzamab Cycle 1 on 10/22. ID was consulted to evaluate prior CT Chest results with ground-glass opacities and need for possible antifungal coverage at discharge. CT scan was reviewed by ID and and did not look fungal in nature. IV Vancomycin was discontinued and Mycamine remained for ppx (No azoles with Inotuzamab). Cycle 2 Inotuzumab started 11/6 for  Days 1, 8, 15. Patient for possible future Haploidentical Allogeneic stem cell transplant after discharge. Pre-BMT testing ordered and  (completed) prior to discharge: Echo, MUGA, Xray sinuses, 24-Hr Urine for Creatinine.  Repeat CT Head was completed on 10/23 for follow up and showed some new bleeding into previous collection. Findings were discussed with Neuro Sx and repeat CT Head was ordered for 6 hours later which ws unchanged. HLA platelets were administered when available for refractory thrombocytopenia. Patient again had severe headache on 11/3 and 11/15, repeat CTs showed nearly resolved hemorrhage. Headaches were managed with analgesics Fentanyl patch 37.5 mcg/hr and Oxycodone IR 15 mg q3 prn prior to discharge home. Patient to follow with Dr. Bentley for Bone Marrow post Cycle 2 Inotuzumab. 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 FISH and PCR negative. s/p Masha-Auto on 12/16/16. Patient presented to the ER for headaches.  Headache was sudden onset, 10/10 intensity throbbing. Located on the occipital and left paritial region. No exacerbating or relieving factors. She then developed nausea w/o vomiting and felt clammy and diaphoretic. She also endorsed SOB when walking from bed room to living room. She denies chest pain or palpations. Around Midweek, patient noticed easy bruising and dark lesions in her mouth, Also patient states that her stool has been black in color starting earlier this week. Her BMs are regular, w/o diarrhea or BRBPR. Denies abdominal pain. States she's tried Tylenol and Ibuprofen for headaches.   Approx. 3 weeks ago, patient had severe 10/10 sharp/burning right abdomen pain, vesicular lesions along her right side, she was diagnosed w/ shingles and given 10 day course of antiviral medications,  the lesions crusted over and now has residual hyperpigmentation and pain.   CT Head showed SDH and Neurosurgery was consulted. Repeat imaging was recommended as well as keeping platelets over 80K. 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 A PICC line was placed for chemotherapy and was initiated with Inotuzamab on Day 1, 8 and 15. IVF and Allopurinol for TLS. The patient has had refractory thrombocytopenia. H L A platelets when they were available. when H L A platelets were not available 1/2 unit of platelet was infused over 3 hours.   Follow up CT Head on 10/11 was stable and the patient continued to receive 1/2 units of platelets over 3 hours every 12 hours. Neurosurgery was consulted on initial detection of SDH, recommendations to keept platelets >80,000The patient received last dose on Inotuzamab Cycle 1 on 10/22. ID was consulted to evaluate prior CT Chest results with ground-glass opacities and need for possible antifungal coverage at discharge. CT scan was reviewed by ID and and did not look fungal in nature. IV Vancomycin was discontinued and Mycamine remained for ppx (No azoles with Inotuzamab). Cycle 2 Inotuzumab started 11/6 for  Days 1, 8, 15. Patient for possible future Haploidentical Allogeneic stem cell transplant after discharge. Pre-BMT testing ordered and  (completed) prior to discharge: Echo, MUGA, Xray sinuses, 24-Hr Urine for Creatinine.  Repeat CT Head was completed on 10/23 for follow up and showed some new bleeding into previous collection. Findings were discussed with Neuro Sx and repeat CT Head was ordered for 6 hours later which ws unchanged. HLA platelets were administered when available for refractory thrombocytopenia. Patient again had severe headache on 11/3 and 11/15, repeat CTs showed nearly resolved hemorrhage. Headaches were managed with analgesics Fentanyl patch 37.5 mcg/hr and Oxycodone IR 15 mg q3 prn prior to discharge home. Patient to follow with Dr. Bentley for Bone Marrow post Cycle 2 Inotuzumab. 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 FISH and PCR negative. s/p Masha-Auto on 12/16/16. Patient prior to presentation had severe burning right abdominal pain with vesicular lesions and was diagnosed with shingles and received a 10 day course of antiviral medications. Patient on presentation had residual neuropathic pain.  Patient presented to the ER with severe occipital parietal headaches with nausea and vomiting. Patient also admitted to easy bruising mouth sores and dark stools.   CT Head was done for complaints of headache which was (+) for SDH. Neurosurgery was consulted on initial detection of SDH, recommendations to keep platelets >80,000 was made.  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 A PICC line was placed for chemotherapy and was initiated with Inotuzamab on Day 1, 8 and 15. IVF and Allopurinol for TLS. The patient has had refractory thrombocytopenia. H L A platelets were infused when they were available. when H L A platelets were not available 1/2 unit of platelet was infused over 3 hours.   Follow up CT Head on 10/11 was stable and the patient continued to receive 1/2 units of platelets over 3 hours every 12 hours. The patient received last dose of Inotuzamab Cycle 1 on 10/22. On 10/15 patient was febrile,  a CT of the chest/abd/pelvis was done which showed scattered patchy ground glass opacities in right upper and lower lobes, suggestive of infection, patient received a course of IV antibiotics for Pneumonia.   On 11/2 Blood cultures were found to be (+) for Co-agulase (-) Staph and patient received a course of Vancomycin. On 11/16 Palliative care/Pain management was consulted.   Cycle 2 Inotuzumab started 11/6 which was given on Days 1, 8, 15.     Patient for possible future Haploidentical Allogeneic stem cell transplant after discharge. Pre-BMT testing ordered and  (completed) prior to discharge: Echo, MUGA, Xray sinuses, 24-Hr Urine for Creatinine.  Repeat CT Head was completed on 10/23 for follow up and showed some new bleeding into previous collection. Findings were discussed with Neuro Sx and repeat CT Head was ordered for 6 hours later which ws unchanged. HLA platelets were administered when available for refractory thrombocytopenia. Patient again had severe headache on 11/3 and 11/15, repeat CTs showed nearly resolved hemorrhage. Headaches were managed with analgesics Fentanyl patch 37.5 mcg/hr and Oxycodone IR 15 mg q3 prn prior to discharge home. Patient to follow with Dr. Bentley for Bone Marrow post Cycle 2 Inotuzumab. 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 FISH and PCR negative. s/p Masha-Auto on 12/16/16. Patient prior to presentation had severe burning right abdominal pain with vesicular lesions and was diagnosed with shingles and received a 10 day course of antiviral medications. Patient on presentation had residual neuropathic pain.  Patient presented to the ER with severe occipital parietal headaches with nausea and vomiting. Patient also admitted to easy bruising mouth sores and dark stools.   CT Head was done for complaints of headache which was (+) for SDH. Neurosurgery was consulted on initial detection of SDH, recommendations to keep platelets >80,000 was made.  LGIB was attributed to profound thrombocytopenia. 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 A PICC line was placed for chemotherapy and was initiated with Inotuzamab on Day 1, 8 and 15. IVF and Allopurinol for TLS. The patient has had refractory thrombocytopenia. H L A platelets were infused when they were available. when H L A platelets were not available 1/2 unit of platelet was infused over 3 hours.   Follow up CT Head on 10/11 was stable and the patient continued to receive 1/2 units of platelets over 3 hours every 12 hours. The patient received last dose of Inotuzamab Cycle 1 on 10/22. On 10/15 patient was febrile,  a CT of the chest/abd/pelvis was done which showed scattered patchy ground glass opacities in right upper and lower lobes, suggestive of infection, patient received a course of IV antibiotics for Pneumonia. On 11/2 Blood cultures were found to be (+) for Co-agulase (-) Staph and patient received a course of Vancomycin.   Repeat CT Head was completed on 10/23 for follow up and showed some new bleeding into previous collection. Findings were discussed with Neuro Sx and repeat CT Head was ordered for 6 hours later which ws unchanged. HLA platelets were administered when available for refractory thrombocytopenia. Cycle 2 Inotuzumab was started on 11/6 which was given on Days 1, 8, 15. Patient tolerated second cycle without any adverse reactions. Patient for possible future Haploidentical Allogeneic stem cell transplant after discharge. Pre-BMT testing completed prior to discharge: Echo, MUGA, Xray sinuses, 24-Hr Urine for Creatinine.  Patient had intermittent severe headaches on 11/3 and 11/15, repeat CTs showed nearly resolved hemorrhage. Headaches were managed with analgesics Fentanyl patch 37 mcg/hr and Oxycodone IR 15 mg q3 prn prior to discharge home. Patient to follow with Dr. Bentley as outpatient. 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 FISH and PCR negative. s/p Masha-Auto on 12/16/16. Patient prior to presentation had severe burning right abdominal pain with vesicular lesions and was diagnosed with shingles and received a 10 day course of antiviral medications. Patient on presentation had residual neuropathic pain.  Patient presented to the ER with severe occipital parietal headaches with nausea and vomiting. Patient also admitted to easy bruising mouth sores and dark stools.   CT Head was done for complaints of headache which was (+) for SDH. Neurosurgery was consulted on initial detection of SDH, recommendations to keep platelets >80,000 was made.  LGIB was attributed to profound thrombocytopenia. 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 A PICC line was placed for chemotherapy and was initiated with Inotuzamab on Day 1, 8 and 15. IVF and Allopurinol for TLS. The patient has had refractory thrombocytopenia. H L A platelets were infused when they were available. when H L A platelets were not available 1/2 unit of platelet was infused over 3 hours.   Follow up CT Head on 10/11 was stable and the patient continued to receive 1/2 units of platelets over 3 hours every 12 hours. The patient received last dose of Inotuzamab Cycle 1 on 10/22. On 10/15 patient was febrile,  a CT of the chest/abd/pelvis was done which showed scattered patchy ground glass opacities in right upper and lower lobes, suggestive of infection, patient received a course of IV antibiotics for Pneumonia. On 11/2 Blood cultures were found to be (+) for Co-agulase (-) Staph and patient received a course of Vancomycin.   Repeat CT Head was completed on 10/23 for follow up and showed some new bleeding into previous collection. Findings were discussed with Neuro Surgery.  HLA platelets were administered when available for refractory thrombocytopenia. Cycle 2 Inotuzumab was started on 11/6 which was given on Days 1, 8, 15. Patient tolerated second cycle without any adverse reactions. Patient for possible future Haploidentical Allogeneic stem cell transplant after discharge. Pre-BMT testing completed prior to discharge: Echo, MUGA, Xray sinuses, 24-Hr Urine for Creatinine.  Patient had intermittent severe headaches on 11/3 and 11/15, repeat CTs showed nearly resolved hemorrhage. Headaches were managed with analgesics Fentanyl patch 37 mcg/hr and Oxycodone IR 15 mg q3 prn prior to discharge home. Patient was advised to follow with Dr. Bentley as outpatient.

## 2018-10-05 NOTE — H&P ADULT - RS GEN PE MLT RESP DETAILS PC
clear to auscultation bilaterally/no rhonchi/no chest wall tenderness/good air movement/no rales/airway patent/breath sounds equal/normal/no wheezes/respirations non-labored

## 2018-10-05 NOTE — H&P ADULT - PROBLEM SELECTOR PLAN 3
Megaloblastic anemia hgb 9.3 acute drop from 4/18. May be also component of acute blood loss anemia in setting of thrombocytopenia ?upper GI bleed  -obtain STAT cbc w/ diff   -transfuse PRN h/h goal >7   -maintain active T&S Megaloblastic anemia hgb 9.3 acute drop from 4/18. May be also component of acute blood loss anemia in setting of thrombocytopenia ?upper GI bleed  -obtain STAT cbc w/ diff, anemia workup labs prior to any PRBC infusions,   -transfuse PRN h/h goal >7   -maintain active T&S

## 2018-10-05 NOTE — H&P ADULT - PROBLEM SELECTOR PLAN 1
In setting of relapse leukemia, platelet count 2 on arrival w/ active bleed  -s/p DDAVP, now w/ 2U platelets   -transfuse platelet to goal >80  -check HIV, hepatitis panel  -frequent h/h trend q4hrs for now. In setting of relapse leukemia, platelet count 2 on arrival w/ active bleed  -s/p DDAVP, now s/p 2U platelets   -transfuse platelet to goal >80  -check HIV, hepatitis panel  -frequent h/h trend q4hrs for now. 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 No focal neurologic findings on exam.  -s/p DDAVP  -transfuse platelet to goal >80  -fall precautions  -Neurosurgery following, no intervention at this time, 4Hr repeat CT not preformed in timely manner 2/2 ?logistical issues. Patient AOx4 neuro exam intact, will get repeat CT now. Spoke to [facility] fellow (José Miguel MONTAÑO) regarding timing of 3rd (AM) CT head, was advised to get 3rd CT 6hrs after 2AM CT head.   -c/w seizure ppx w/ keppra 500mg BID  -Tylenol 650mg PRN for mild/mod pain; clarified w/ [facility] fellow, Okay to use tramadol for severe pain.   -neurochecks q4 hrs  -Zofran PRN

## 2018-10-05 NOTE — H&P ADULT - PROBLEM SELECTOR PLAN 5
Patient reporting daily melanotic stools, in setting of severe thrombocytopenia   -will not preform rectal exam in setting of severe thrombocytopenia   -start protonix ggt  -check occult blood.   -trend h/h q6 or sooner. Patient reporting daily melanotic stools, in setting of severe thrombocytopenia   -will not preform rectal exam in setting of severe thrombocytopenia   -start protonix ggt  -check occult blood, stool count   -trend h/h q4, if h/h continues to drop may need CT ab/pelvis w/ oral and IV contrast. r/o RP bleed or intraabdominal bleed Now in presumed blast crisis w/ >70% blasts.   -start allopurinol 300mg OD for prevention of TLS, IVF hydration NS 150cc/hr   -in preparation of potential chemo therapy, check HIV, hepatitis panel, Quant gold.  -Spoke to Heme fellow Jeferson pager#[phone] and made aware agree w/ current management. Plan for BM biopsy in AM. Heme to follow in AM  -Patient has taken 10/4 dose of imatinib, will defer to heme in AM on resuming.

## 2018-10-06 LAB
ALBUMIN SERPL ELPH-MCNC: 3.5 G/DL — SIGNIFICANT CHANGE UP (ref 3.3–5)
ALP SERPL-CCNC: 172 U/L — HIGH (ref 40–120)
ALT FLD-CCNC: 44 U/L — SIGNIFICANT CHANGE UP (ref 10–45)
ANION GAP SERPL CALC-SCNC: 9 MMOL/L — SIGNIFICANT CHANGE UP (ref 5–17)
AST SERPL-CCNC: 33 U/L — SIGNIFICANT CHANGE UP (ref 10–40)
BASOPHILS # BLD AUTO: 0 K/UL — SIGNIFICANT CHANGE UP (ref 0–0.2)
BASOPHILS NFR BLD AUTO: 0 % — SIGNIFICANT CHANGE UP (ref 0–2)
BILIRUB SERPL-MCNC: 0.4 MG/DL — SIGNIFICANT CHANGE UP (ref 0.2–1.2)
BUN SERPL-MCNC: 9 MG/DL — SIGNIFICANT CHANGE UP (ref 7–23)
CALCIUM SERPL-MCNC: 8.5 MG/DL — SIGNIFICANT CHANGE UP (ref 8.4–10.5)
CHLORIDE SERPL-SCNC: 110 MMOL/L — HIGH (ref 96–108)
CO2 SERPL-SCNC: 21 MMOL/L — LOW (ref 22–31)
CREAT SERPL-MCNC: 1.02 MG/DL — SIGNIFICANT CHANGE UP (ref 0.5–1.3)
EOSINOPHIL # BLD AUTO: SIGNIFICANT CHANGE UP (ref 0–0.5)
EOSINOPHIL NFR BLD AUTO: 0 % — SIGNIFICANT CHANGE UP (ref 0–6)
GAMMA INTERFERON BACKGROUND BLD IA-ACNC: 0.13 IU/ML — SIGNIFICANT CHANGE UP
GLUCOSE SERPL-MCNC: 108 MG/DL — HIGH (ref 70–99)
HCT VFR BLD CALC: 25.8 % — LOW (ref 34.5–45)
HGB BLD-MCNC: 8.8 G/DL — LOW (ref 11.5–15.5)
LDH SERPL L TO P-CCNC: 197 U/L — SIGNIFICANT CHANGE UP (ref 50–242)
LYMPHOCYTES # BLD AUTO: 24 % — SIGNIFICANT CHANGE UP (ref 13–44)
LYMPHOCYTES # BLD AUTO: SIGNIFICANT CHANGE UP (ref 1–3.3)
M TB IFN-G BLD-IMP: NEGATIVE — SIGNIFICANT CHANGE UP
M TB IFN-G CD4+ BCKGRND COR BLD-ACNC: -0.01 IU/ML — SIGNIFICANT CHANGE UP
M TB IFN-G CD4+CD8+ BCKGRND COR BLD-ACNC: 0.01 IU/ML — SIGNIFICANT CHANGE UP
MAGNESIUM SERPL-MCNC: 1.9 MG/DL — SIGNIFICANT CHANGE UP (ref 1.6–2.6)
MCHC RBC-ENTMCNC: 34.2 GM/DL — SIGNIFICANT CHANGE UP (ref 32–36)
MCHC RBC-ENTMCNC: 34.2 PG — HIGH (ref 27–34)
MCV RBC AUTO: 100 FL — SIGNIFICANT CHANGE UP (ref 80–100)
MONOCYTES # BLD AUTO: SIGNIFICANT CHANGE UP (ref 0–0.9)
MONOCYTES NFR BLD AUTO: 0 % — SIGNIFICANT CHANGE UP (ref 2–14)
NEUTROPHILS # BLD AUTO: SIGNIFICANT CHANGE UP (ref 1.8–7.4)
NEUTROPHILS NFR BLD AUTO: 1 % — LOW (ref 43–77)
PHOSPHATE SERPL-MCNC: 1.9 MG/DL — LOW (ref 2.5–4.5)
PLATELET # BLD AUTO: 39 K/UL — LOW (ref 150–400)
PLATELET # BLD AUTO: 48 K/UL — LOW (ref 150–400)
POTASSIUM SERPL-MCNC: 3.8 MMOL/L — SIGNIFICANT CHANGE UP (ref 3.5–5.3)
POTASSIUM SERPL-SCNC: 3.8 MMOL/L — SIGNIFICANT CHANGE UP (ref 3.5–5.3)
PROT SERPL-MCNC: 5.9 G/DL — LOW (ref 6–8.3)
QUANT TB PLUS MITOGEN MINUS NIL: 8.25 IU/ML — SIGNIFICANT CHANGE UP
RBC # BLD: 2.58 M/UL — LOW (ref 3.8–5.2)
RBC # FLD: 14.5 % — SIGNIFICANT CHANGE UP (ref 10.3–14.5)
SODIUM SERPL-SCNC: 140 MMOL/L — SIGNIFICANT CHANGE UP (ref 135–145)
URATE SERPL-MCNC: 4.3 MG/DL — SIGNIFICANT CHANGE UP (ref 2.5–7)
WBC # BLD: 19.9 K/UL — HIGH (ref 3.8–10.5)
WBC # FLD AUTO: 19.9 K/UL — HIGH (ref 3.8–10.5)

## 2018-10-06 PROCEDURE — 99232 SBSQ HOSP IP/OBS MODERATE 35: CPT

## 2018-10-06 PROCEDURE — 70450 CT HEAD/BRAIN W/O DYE: CPT | Mod: 26

## 2018-10-06 RX ORDER — METOCLOPRAMIDE HCL 10 MG
10 TABLET ORAL EVERY 6 HOURS
Qty: 0 | Refills: 0 | Status: DISCONTINUED | OUTPATIENT
Start: 2018-10-06 | End: 2018-11-20

## 2018-10-06 RX ORDER — POTASSIUM PHOSPHATE, MONOBASIC POTASSIUM PHOSPHATE, DIBASIC 236; 224 MG/ML; MG/ML
15 INJECTION, SOLUTION INTRAVENOUS ONCE
Qty: 0 | Refills: 0 | Status: COMPLETED | OUTPATIENT
Start: 2018-10-06 | End: 2018-10-06

## 2018-10-06 RX ADMIN — LEVETIRACETAM 500 MILLIGRAM(S): 250 TABLET, FILM COATED ORAL at 05:06

## 2018-10-06 RX ADMIN — HYDROMORPHONE HYDROCHLORIDE 1 MILLIGRAM(S): 2 INJECTION INTRAMUSCULAR; INTRAVENOUS; SUBCUTANEOUS at 17:18

## 2018-10-06 RX ADMIN — SODIUM CHLORIDE 150 MILLILITER(S): 9 INJECTION INTRAMUSCULAR; INTRAVENOUS; SUBCUTANEOUS at 05:07

## 2018-10-06 RX ADMIN — HYDROMORPHONE HYDROCHLORIDE 1 MILLIGRAM(S): 2 INJECTION INTRAMUSCULAR; INTRAVENOUS; SUBCUTANEOUS at 22:25

## 2018-10-06 RX ADMIN — HYDROMORPHONE HYDROCHLORIDE 1 MILLIGRAM(S): 2 INJECTION INTRAMUSCULAR; INTRAVENOUS; SUBCUTANEOUS at 06:49

## 2018-10-06 RX ADMIN — PANTOPRAZOLE SODIUM 10 MG/HR: 20 TABLET, DELAYED RELEASE ORAL at 05:07

## 2018-10-06 RX ADMIN — POTASSIUM PHOSPHATE, MONOBASIC POTASSIUM PHOSPHATE, DIBASIC 62.5 MILLIMOLE(S): 236; 224 INJECTION, SOLUTION INTRAVENOUS at 21:47

## 2018-10-06 RX ADMIN — Medication 10 MILLIGRAM(S): at 11:06

## 2018-10-06 RX ADMIN — HYDROMORPHONE HYDROCHLORIDE 1 MILLIGRAM(S): 2 INJECTION INTRAMUSCULAR; INTRAVENOUS; SUBCUTANEOUS at 07:43

## 2018-10-06 RX ADMIN — SODIUM CHLORIDE 150 MILLILITER(S): 9 INJECTION INTRAMUSCULAR; INTRAVENOUS; SUBCUTANEOUS at 12:04

## 2018-10-06 RX ADMIN — POSACONAZOLE 300 MILLIGRAM(S): 100 TABLET, DELAYED RELEASE ORAL at 12:04

## 2018-10-06 RX ADMIN — LEVETIRACETAM 500 MILLIGRAM(S): 250 TABLET, FILM COATED ORAL at 17:20

## 2018-10-06 RX ADMIN — HYDROMORPHONE HYDROCHLORIDE 1 MILLIGRAM(S): 2 INJECTION INTRAMUSCULAR; INTRAVENOUS; SUBCUTANEOUS at 16:11

## 2018-10-06 RX ADMIN — Medication 1 TABLET(S): at 12:04

## 2018-10-06 RX ADMIN — PANTOPRAZOLE SODIUM 10 MG/HR: 20 TABLET, DELAYED RELEASE ORAL at 23:18

## 2018-10-06 RX ADMIN — HYDROMORPHONE HYDROCHLORIDE 1 MILLIGRAM(S): 2 INJECTION INTRAMUSCULAR; INTRAVENOUS; SUBCUTANEOUS at 22:09

## 2018-10-06 RX ADMIN — PANTOPRAZOLE SODIUM 10 MG/HR: 20 TABLET, DELAYED RELEASE ORAL at 14:55

## 2018-10-06 NOTE — PROGRESS NOTE ADULT - SUBJECTIVE AND OBJECTIVE BOX
Diagnosis: ALL pH(+) relapsed    Protocol/Chemo Regimen: TBD    Day: N/A    Pt endorsed: Headache improved with pain medications    Review of Systems: Patient denied nausea, vomiting,  chest pain, cough, dyspnea, abdominal pain, constipation, diarrhea       Pain scale: 5-6/10 headache     Diet: Regular     Allergies    No Known Drug Allergies  shellfish (Nausea; Vomiting)    Intolerances    ANTIMICROBIALS  Levaquin 500 mg PO daily  Posaconazole 300 mg PO  daily       HEME/ONC MEDICATIONS      STANDING MEDICATIONS  levETIRAcetam 500 milliGRAM(s) Oral two times a day  multivitamin 1 Tablet(s) Oral daily  pantoprazole Infusion 8 mG/Hr IV Continuous <Continuous>  sodium chloride 0.9%. 1000 milliLiter(s) IV Continuous <Continuous>      PRN MEDICATIONS  acetaminophen   Tablet .. 650 milliGRAM(s) Oral every 6 hours PRN  traMADol 25 milliGRAM(s) Oral every 12 hours PRN        Vital Signs Last 24 Hrs  T(C): 37.4 (06 Oct 2018 05:13), Max: 37.6 (05 Oct 2018 21:13)  T(F): 99.3 (06 Oct 2018 05:13), Max: 99.7 (05 Oct 2018 21:13)  HR: 87 (06 Oct 2018 05:13) (79 - 87)  BP: 113/68 (06 Oct 2018 05:13) (101/72 - 144/84)  BP(mean): --  RR: 18 (06 Oct 2018 05:13) (18 - 18)  SpO2: 97% (06 Oct 2018 05:13) (97% - 100%)    PHYSICAL EXAM  General: NAD  HEENT: PERRLa, anicteric sclera, pink conjunctiva, Ommaya in place   Neck: supple  CV: (+) S1/S2 RRR  Lungs: clear to auscultation, no wheezes or rales  Abdomen: soft, non-tender, non-distended (+) BS  Ext: no clubbing, cyanosis or edema  Skin: no rashes and no petechiae  Neuro: alert and oriented X 3, no focal deficits  Central Line: Peripheral Line CDI                       RADIOLOGY & ADDITIONAL STUDIES:      CT Head No Cont (10.04.18 @ 19:47) >  Small bilateral acute on chronic convexity subdural hematomas, as   discussed. No midline shift or herniation.    Additional small areas of subdural hemorrhage within the left parafalcine   region with layering hemorrhage along the left tentorial leaflet, right   anterior frontal convexity, and also adjacent to the right anterior falx.    Disproportionate cerebellar atrophy which appears worsened when compared   to the prior CT examination dated 11/9/2016.    Right frontal approach Ommaya reservoir catheter in place without   evidence of hydrocephalus.    CT Head No Cont (10.05.18 @ 02:24)  Grossly stable subdural hemorrhages.      CT Abdomen and Pelvis No Cont (10.05.18 @ 15:49)   IMPRESSION: No evidence of retroperitoneal bleed.    Cholelithiasis.

## 2018-10-06 NOTE — PROGRESS NOTE ADULT - PROBLEM SELECTOR PLAN 2
Pt is neutropenic, afebrile  Continue Levaquin and Posaconazole   If spikes pancx CXR and Change Levaquin to Cefepime  10/5 CT abd cholelithiases, no source of infection Pt is neutropenic, afebrile  Continue Levaquin and Posaconazole   If spikes pancx CXR and Change Levaquin to Cefepime  10/5 CT abd cholelithiases, no source of infection  10/4 QuantiFeron gold (-)

## 2018-10-06 NOTE — PHYSICAL THERAPY INITIAL EVALUATION ADULT - PERTINENT HX OF CURRENT PROBLEM, REHAB EVAL
58yoF with HTN, obesity, ALL, p/w HA, found SDH, CT ABD 10/5, cholelithiasis, CT head 10/5, SDH, ECG (-)

## 2018-10-06 NOTE — PROGRESS NOTE ADULT - PROBLEM SELECTOR PLAN 1
10/5 follow up flow  Monitor CBC/Lytes and transfuse/replete PRN  Strict Is and Os/Daily weights/Mouth Care  IVF  Antiemetics  Allopurinol 300mg PO daily  PICC placement on 10/7

## 2018-10-06 NOTE — PROGRESS NOTE ADULT - ASSESSMENT
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 FISH and PCR negative. s/p Masha-Auto on 12/16/16. 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 Awaiting peripheral flow results to start salvage chemotherapy.

## 2018-10-06 NOTE — PROGRESS NOTE ADULT - SUBJECTIVE AND OBJECTIVE BOX
Patient seen and examined at bedside.    T(C): 36.9 (10-06-18 @ 09:12), Max: 37.6 (10-05-18 @ 21:13)  HR: 80 (10-06-18 @ 09:12) (79 - 87)  BP: 124/68 (10-06-18 @ 09:12) (101/72 - 144/84)  RR: 18 (10-06-18 @ 09:12) (18 - 18)  SpO2: 100% (10-06-18 @ 09:12) (97% - 100%)  Wt(kg): --    Exam:  AOx3, FC, PERRL, EOMI, no facial   5/5 throughout, new RUE drift  SILT  no

## 2018-10-06 NOTE — PROGRESS NOTE ADULT - ASSESSMENT
57 yo F with pmh of ALL dx 4/2016 sp chemotherapy (last dose years ago) and ommaya.  Here with persistent HA for the past week, mild nausea.  No vomitting.  CTH showed very small bl acute on chronic sdh, no shift. Small interhemispheric and left tentorial heme. Of note PLT count 2. Was given tordal in ER.    - ddavp/plt x2-3  (goal > 80)  - CTH again today for RUE drift  - neurochecks   - keppra 500 bid    DO NOT TAP OMMAYA GIVEN SDH

## 2018-10-06 NOTE — PHYSICAL THERAPY INITIAL EVALUATION ADULT - ADDITIONAL COMMENTS
as per pt, resides in a elevated APT alone, no stair to enter, PTA, pt (I) with amb with SC, (I) with ADL

## 2018-10-06 NOTE — PROGRESS NOTE ADULT - ATTENDING COMMENTS
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 FISH and PCR negative. s/p Masha-Auto on 12/16/16. Pt was on sprycel maintenance. 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     - Awaiting peripheral flow results to start salvage chemotherapy - inotuzumab.  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 FISH and PCR negative. s/p Masha-Auto on 12/16/16. Pt was on sprycel maintenance. 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     - Awaiting peripheral flow results to confirm relapse.  Plan to start salvage chemotherapy - inotuzumab on Monday.  - cont to keep plt ct >80K, keep hb >7  - cont keppra px  - neutropenic, start levaquin/posa px.  If febrile, begin cefepime.   - mucosal bleeding now resolved, cont oral care, cont protonix gtt

## 2018-10-06 NOTE — PROGRESS NOTE ADULT - PROBLEM SELECTOR PLAN 3
10/4 CT  head revealed small bilateral acute on chronic convexity subdural hematomas, 10/5 Repeat CT head stable   s/p DDAVP on 10/4  Keep platelet  >80  Neuro checks q4 hrs  Continue with  Keppra 500mg BID for seizure ppx   Pain management   Neurosurgery following, no intervention at this time 10/4 CT  head revealed small bilateral acute on chronic convexity subdural hematomas, 10/5 and 10/6 Repeat CT head stable   s/p DDAVP on 10/4  Keep platelet  >80  Neuro checks q4 hrs  Continue with  Keppra 500mg BID for seizure ppx   Pain management   Neurosurgery following, no intervention at this time

## 2018-10-07 LAB
ALBUMIN SERPL ELPH-MCNC: 3.2 G/DL — LOW (ref 3.3–5)
ALP SERPL-CCNC: 143 U/L — HIGH (ref 40–120)
ALT FLD-CCNC: 35 U/L — SIGNIFICANT CHANGE UP (ref 10–45)
ANION GAP SERPL CALC-SCNC: 9 MMOL/L — SIGNIFICANT CHANGE UP (ref 5–17)
AST SERPL-CCNC: 22 U/L — SIGNIFICANT CHANGE UP (ref 10–40)
BILIRUB SERPL-MCNC: 0.3 MG/DL — SIGNIFICANT CHANGE UP (ref 0.2–1.2)
BUN SERPL-MCNC: 6 MG/DL — LOW (ref 7–23)
CALCIUM SERPL-MCNC: 8.3 MG/DL — LOW (ref 8.4–10.5)
CHLORIDE SERPL-SCNC: 112 MMOL/L — HIGH (ref 96–108)
CO2 SERPL-SCNC: 21 MMOL/L — LOW (ref 22–31)
CREAT SERPL-MCNC: 0.99 MG/DL — SIGNIFICANT CHANGE UP (ref 0.5–1.3)
GLUCOSE SERPL-MCNC: 131 MG/DL — HIGH (ref 70–99)
HCT VFR BLD CALC: 23.7 % — LOW (ref 34.5–45)
HGB BLD-MCNC: 8.3 G/DL — LOW (ref 11.5–15.5)
LDH SERPL L TO P-CCNC: 173 U/L — SIGNIFICANT CHANGE UP (ref 50–242)
MAGNESIUM SERPL-MCNC: 1.8 MG/DL — SIGNIFICANT CHANGE UP (ref 1.6–2.6)
MCHC RBC-ENTMCNC: 35.2 GM/DL — SIGNIFICANT CHANGE UP (ref 32–36)
MCHC RBC-ENTMCNC: 35.2 PG — HIGH (ref 27–34)
MCV RBC AUTO: 100 FL — SIGNIFICANT CHANGE UP (ref 80–100)
PHOSPHATE SERPL-MCNC: 2.1 MG/DL — LOW (ref 2.5–4.5)
PLATELET # BLD AUTO: 32 K/UL — LOW (ref 150–400)
PLATELET # BLD AUTO: 61 K/UL — LOW (ref 150–400)
PLATELET # BLD AUTO: 73 K/UL — LOW (ref 150–400)
POTASSIUM SERPL-MCNC: 3.5 MMOL/L — SIGNIFICANT CHANGE UP (ref 3.5–5.3)
POTASSIUM SERPL-SCNC: 3.5 MMOL/L — SIGNIFICANT CHANGE UP (ref 3.5–5.3)
PROT SERPL-MCNC: 5.6 G/DL — LOW (ref 6–8.3)
RBC # BLD: 2.37 M/UL — LOW (ref 3.8–5.2)
RBC # FLD: 14.2 % — SIGNIFICANT CHANGE UP (ref 10.3–14.5)
SODIUM SERPL-SCNC: 142 MMOL/L — SIGNIFICANT CHANGE UP (ref 135–145)
URATE SERPL-MCNC: 4.5 MG/DL — SIGNIFICANT CHANGE UP (ref 2.5–7)
WBC # BLD: 10.8 K/UL — HIGH (ref 3.8–10.5)
WBC # FLD AUTO: 10.8 K/UL — HIGH (ref 3.8–10.5)

## 2018-10-07 PROCEDURE — 99232 SBSQ HOSP IP/OBS MODERATE 35: CPT

## 2018-10-07 RX ORDER — SODIUM CHLORIDE 9 MG/ML
1000 INJECTION INTRAMUSCULAR; INTRAVENOUS; SUBCUTANEOUS
Qty: 0 | Refills: 0 | Status: DISCONTINUED | OUTPATIENT
Start: 2018-10-07 | End: 2018-10-10

## 2018-10-07 RX ORDER — PANTOPRAZOLE SODIUM 20 MG/1
40 TABLET, DELAYED RELEASE ORAL DAILY
Qty: 0 | Refills: 0 | Status: DISCONTINUED | OUTPATIENT
Start: 2018-10-08 | End: 2018-10-09

## 2018-10-07 RX ORDER — POTASSIUM PHOSPHATE, MONOBASIC POTASSIUM PHOSPHATE, DIBASIC 236; 224 MG/ML; MG/ML
15 INJECTION, SOLUTION INTRAVENOUS ONCE
Qty: 0 | Refills: 0 | Status: COMPLETED | OUTPATIENT
Start: 2018-10-07 | End: 2018-10-07

## 2018-10-07 RX ADMIN — SODIUM CHLORIDE 100 MILLILITER(S): 9 INJECTION INTRAMUSCULAR; INTRAVENOUS; SUBCUTANEOUS at 21:41

## 2018-10-07 RX ADMIN — HYDROMORPHONE HYDROCHLORIDE 1 MILLIGRAM(S): 2 INJECTION INTRAMUSCULAR; INTRAVENOUS; SUBCUTANEOUS at 19:58

## 2018-10-07 RX ADMIN — HYDROMORPHONE HYDROCHLORIDE 1 MILLIGRAM(S): 2 INJECTION INTRAMUSCULAR; INTRAVENOUS; SUBCUTANEOUS at 05:16

## 2018-10-07 RX ADMIN — Medication 1 TABLET(S): at 12:10

## 2018-10-07 RX ADMIN — POSACONAZOLE 300 MILLIGRAM(S): 100 TABLET, DELAYED RELEASE ORAL at 12:10

## 2018-10-07 RX ADMIN — LEVETIRACETAM 500 MILLIGRAM(S): 250 TABLET, FILM COATED ORAL at 05:01

## 2018-10-07 RX ADMIN — HYDROMORPHONE HYDROCHLORIDE 1 MILLIGRAM(S): 2 INJECTION INTRAMUSCULAR; INTRAVENOUS; SUBCUTANEOUS at 12:19

## 2018-10-07 RX ADMIN — Medication 10 MILLIGRAM(S): at 06:43

## 2018-10-07 RX ADMIN — LEVETIRACETAM 500 MILLIGRAM(S): 250 TABLET, FILM COATED ORAL at 17:49

## 2018-10-07 RX ADMIN — HYDROMORPHONE HYDROCHLORIDE 1 MILLIGRAM(S): 2 INJECTION INTRAMUSCULAR; INTRAVENOUS; SUBCUTANEOUS at 05:01

## 2018-10-07 RX ADMIN — POTASSIUM PHOSPHATE, MONOBASIC POTASSIUM PHOSPHATE, DIBASIC 62.5 MILLIMOLE(S): 236; 224 INJECTION, SOLUTION INTRAVENOUS at 21:39

## 2018-10-07 RX ADMIN — Medication 0.5 MILLIGRAM(S): at 15:50

## 2018-10-07 RX ADMIN — HYDROMORPHONE HYDROCHLORIDE 1 MILLIGRAM(S): 2 INJECTION INTRAMUSCULAR; INTRAVENOUS; SUBCUTANEOUS at 20:13

## 2018-10-07 RX ADMIN — HYDROMORPHONE HYDROCHLORIDE 1 MILLIGRAM(S): 2 INJECTION INTRAMUSCULAR; INTRAVENOUS; SUBCUTANEOUS at 14:15

## 2018-10-07 RX ADMIN — Medication 10 MILLIGRAM(S): at 12:10

## 2018-10-07 NOTE — PROGRESS NOTE ADULT - PROBLEM SELECTOR PLAN 2
Pt is neutropenic, afebrile  Continue Levaquin and Posaconazole   If spikes pancx CXR and Change Levaquin to Cefepime  10/5 CT abd cholelithiases, no source of infection  10/4 QuantiFeron gold (-)

## 2018-10-07 NOTE — PROGRESS NOTE ADULT - PROBLEM SELECTOR PLAN 1
10/5 follow up flow  Monitor CBC/Lytes and transfuse/replete PRN  Strict Is and Os/Daily weights/Mouth Care  IVF  Antiemetics  Allopurinol 300mg PO daily  PICC placement on 10/7 10/5 follow up flow  Monitor CBC/Lytes and transfuse/replete PRN  hypophosphatemia: Potassium phosphate 15 mMole IVx 1  Strict Is and Os/Daily weights/Mouth Care  IVF  Antiemetics  Allopurinol 300mg PO daily  PICC placement on 10/7  Plan to start salvage chemotherapy - lintuzumab on 10/8

## 2018-10-07 NOTE — PROGRESS NOTE ADULT - ATTENDING COMMENTS
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 FISH and PCR negative. s/p Masha-Auto on 12/16/16. Pt was on sprycel maintenance. 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     - Awaiting peripheral flow results to confirm relapse.  Plan to start salvage chemotherapy - inotuzumab on Monday.  - cont to keep plt ct >80K, keep hb >7  - cont keppra px  - neutropenic, start levaquin/posa px.  If febrile, begin cefepime.   - mucosal bleeding now resolved, cont oral care, cont protonix gtt 58F  Ph(+) ALL s/p R Hypercavd x4 cycles IT MTX x2 s/p stem cell collection w/ Step 1(HD vp16 plus arac) regimen. FISH and PCR negative. s/p Masha-Auto on 12/16/16. Pt was on sprycel maintenance. 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     - Awaiting peripheral flow results to confirm relapse.  Plan to start salvage chemotherapy - inotuzumab on Monday.  - cont to keep plt ct >80K, keep hb >7  - cont keppra px  - neutropenic, start levaquin/posa px.  If febrile, begin cefepime.   - mucosal bleeding now resolved, cont oral care  -GI bleed has resolved, d/c protonix gtt and begin IV protonix.

## 2018-10-07 NOTE — PROGRESS NOTE ADULT - PROBLEM SELECTOR PLAN 4
Follow up stool for occult blood   Continue Protonix drip Resolved   Follow up stool for occult blood   Discontinue  Protonix drip on 10/7  Continue Protonix 40 mg IVP daily

## 2018-10-07 NOTE — PROGRESS NOTE ADULT - PROBLEM SELECTOR PLAN 3
10/4 CT  head revealed small bilateral acute on chronic convexity subdural hematomas, 10/5 and 10/6 Repeat CT head stable   s/p DDAVP on 10/4  Keep platelet  >80  Neuro checks q4 hrs  Continue with  Keppra 500mg BID for seizure ppx   Pain management   Neurosurgery following, no intervention at this time

## 2018-10-07 NOTE — PROGRESS NOTE ADULT - ASSESSMENT
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 FISH and PCR negative. s/p Masha-Auto on 12/16/16. 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 Awaiting peripheral flow results to start salvage chemotherapy. 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 FISH and PCR negative. s/p Masha-Auto on 12/16/16.Pt was on sprycel maintenance. Now admitted for subdural hematoma in setting of severe thrombocytopenia secondary to  blast crisis presumed to have relapse of leukemia.

## 2018-10-07 NOTE — PROGRESS NOTE ADULT - SUBJECTIVE AND OBJECTIVE BOX
Diagnosis: ALL pH(+) relapsed    Protocol/Chemo Regimen: TBD    Day: N/A    Pt endorsed: Headache improved with pain medications    Review of Systems: Patient denied nausea, vomiting,  chest pain, cough, dyspnea, abdominal pain, constipation, diarrhea       Pain scale: denies now     Diet: Regular     Allergies    No Known Drug Allergies  shellfish (Nausea; Vomiting)    Intolerances    ANTIMICROBIALS  Levaquin 500 mg PO daily  Posaconazole 300 mg PO  daily       HEME/ONC MEDICATIONS      STANDING MEDICATIONS  levETIRAcetam 500 milliGRAM(s) Oral two times a day  multivitamin 1 Tablet(s) Oral daily  pantoprazole Infusion 8 mG/Hr IV Continuous <Continuous>  sodium chloride 0.9%. 1000 milliLiter(s) IV Continuous <Continuous>      PRN MEDICATIONS  acetaminophen   Tablet .. 650 milliGRAM(s) Oral every 6 hours PRN  traMADol 25 milliGRAM(s) Oral every 12 hours PRN      Vital Signs Last 24 Hrs  T(C): 37.2 (07 Oct 2018 05:50), Max: 37.5 (06 Oct 2018 13:30)  T(F): 98.9 (07 Oct 2018 05:50), Max: 99.5 (06 Oct 2018 13:30)  HR: 77 (07 Oct 2018 05:50) (77 - 96)  BP: 125/67 (07 Oct 2018 05:50) (103/58 - 128/80)  BP(mean): --  RR: 18 (07 Oct 2018 05:50) (18 - 20)  SpO2: 97% (07 Oct 2018 05:50) (96% - 100%)    PHYSICAL EXAM  General: NAD  HEENT: PERRLa, anicteric sclera, pink conjunctiva, Ommaya in place   Neck: supple  CV: (+) S1/S2 RRR  Lungs: clear to auscultation, no wheezes or rales  Abdomen: soft, non-tender, non-distended (+) BS  Ext: no clubbing, cyanosis or edema  Skin: no rashes and no petechiae  Neuro: alert and oriented X 3, no focal deficits  Central Line: Peripheral Line CDI                       RADIOLOGY & ADDITIONAL STUDIES:      CT Head No Cont (10.04.18 @ 19:47) >  Small bilateral acute on chronic convexity subdural hematomas, as   discussed. No midline shift or herniation.    Additional small areas of subdural hemorrhage within the left parafalcine   region with layering hemorrhage along the left tentorial leaflet, right   anterior frontal convexity, and also adjacent to the right anterior falx.    Disproportionate cerebellar atrophy which appears worsened when compared   to the prior CT examination dated 11/9/2016.    Right frontal approach Ommaya reservoir catheter in place without   evidence of hydrocephalus.    CT Head No Cont (10.05.18 @ 02:24)  Grossly stable subdural hemorrhages.      CT Abdomen and Pelvis No Cont (10.05.18 @ 15:49)   IMPRESSION: No evidence of retroperitoneal bleed.    Cholelithiasis. Diagnosis: ALL pH(+) relapsed    Protocol/Chemo Regimen: TBD    Day: N/A    Pt endorsed: Headache improved with pain medications    Review of Systems: Patient denied nausea, vomiting,  chest pain, cough, dyspnea, abdominal pain, constipation, diarrhea       Pain scale: denies now     Diet: Regular     Allergies    No Known Drug Allergies  shellfish (Nausea; Vomiting)    Intolerances    ANTIMICROBIALS  Levaquin 500 mg PO daily  Posaconazole 300 mg PO  daily       HEME/ONC MEDICATIONS      STANDING MEDICATIONS  levETIRAcetam 500 milliGRAM(s) Oral two times a day  multivitamin 1 Tablet(s) Oral daily  pantoprazole Infusion 8 mG/Hr IV Continuous <Continuous>  sodium chloride 0.9%. 1000 milliLiter(s) IV Continuous <Continuous>      PRN MEDICATIONS  acetaminophen   Tablet .. 650 milliGRAM(s) Oral every 6 hours PRN  traMADol 25 milliGRAM(s) Oral every 12 hours PRN      Vital Signs Last 24 Hrs  T(C): 37.2 (07 Oct 2018 05:50), Max: 37.5 (06 Oct 2018 13:30)  T(F): 98.9 (07 Oct 2018 05:50), Max: 99.5 (06 Oct 2018 13:30)  HR: 77 (07 Oct 2018 05:50) (77 - 96)  BP: 125/67 (07 Oct 2018 05:50) (103/58 - 128/80)  BP(mean): --  RR: 18 (07 Oct 2018 05:50) (18 - 20)  SpO2: 97% (07 Oct 2018 05:50) (96% - 100%)    PHYSICAL EXAM  General: NAD  HEENT: PERRLa, anicteric sclera, pink conjunctiva, Ommaya in place   Neck: supple  CV: (+) S1/S2 RRR  Lungs: clear to auscultation, no wheezes or rales  Abdomen: soft, non-tender, non-distended (+) BS  Ext: no clubbing, cyanosis or edema  Skin: no rashes and no petechiae  Neuro: alert and oriented X 3, no focal deficits  Central Line: Peripheral Line CDI     LABS:                            x      x     )-----------( 61       ( 07 Oct 2018 14:02 )             x            Mean Cell Volume : 100.0 fl  Mean Cell Hemoglobin : 35.2 pg  Mean Cell Hemoglobin Concentration : 35.2 gm/dL  Auto Neutrophil # : x  Auto Lymphocyte # : x  Auto Monocyte # : x  Auto Eosinophil # : x  Auto Basophil # : x  Auto Neutrophil % : x  Auto Lymphocyte % : x  Auto Monocyte % : x  Auto Eosinophil % : x  Auto Basophil % : x      10-07    142  |  112<H>  |  6<L>  ----------------------------<  131<H>  3.5   |  21<L>  |  0.99    Ca    8.3<L>      07 Oct 2018 10:04  Phos  2.1     10-07  Mg     1.8     10-07    TPro  5.6<L>  /  Alb  3.2<L>  /  TBili  0.3  /  DBili  x   /  AST  22  /  ALT  35  /  AlkPhos  143<H>  10-07      Mg 1.8  Phos 2.1  Mg 1.9  Phos 1.9            Uric Acid 4.5      Uric Acid 4.3                                RADIOLOGY & ADDITIONAL STUDIES:      CT Head No Cont (10.04.18 @ 19:47) >  Small bilateral acute on chronic convexity subdural hematomas, as   discussed. No midline shift or herniation.    Additional small areas of subdural hemorrhage within the left parafalcine   region with layering hemorrhage along the left tentorial leaflet, right   anterior frontal convexity, and also adjacent to the right anterior falx.    Disproportionate cerebellar atrophy which appears worsened when compared   to the prior CT examination dated 11/9/2016.    Right frontal approach Ommaya reservoir catheter in place without   evidence of hydrocephalus.    CT Head No Cont (10.05.18 @ 02:24)  Grossly stable subdural hemorrhages.      CT Abdomen and Pelvis No Cont (10.05.18 @ 15:49)   IMPRESSION: No evidence of retroperitoneal bleed.    Cholelithiasis. Diagnosis: ALL pH(+) relapsed    Protocol/Chemo Regimen: TBD    Day: N/A    Pt endorsed: Headache improved with pain medications, nausea    Review of Systems: Patient denied, vomiting,  chest pain, cough, dyspnea, abdominal pain, constipation, diarrhea       Pain scale: denies now     Diet: Regular     Allergies    No Known Drug Allergies  shellfish (Nausea; Vomiting)    Intolerances    ANTIMICROBIALS  Levaquin 500 mg PO daily  Posaconazole 300 mg PO  daily       HEME/ONC MEDICATIONS      STANDING MEDICATIONS  levETIRAcetam 500 milliGRAM(s) Oral two times a day  multivitamin 1 Tablet(s) Oral daily  pantoprazole Infusion 8 mG/Hr IV Continuous <Continuous>  sodium chloride 0.9%. 1000 milliLiter(s) IV Continuous <Continuous>      PRN MEDICATIONS  acetaminophen   Tablet .. 650 milliGRAM(s) Oral every 6 hours PRN  traMADol 25 milliGRAM(s) Oral every 12 hours PRN      Vital Signs Last 24 Hrs  T(C): 37.2 (07 Oct 2018 05:50), Max: 37.5 (06 Oct 2018 13:30)  T(F): 98.9 (07 Oct 2018 05:50), Max: 99.5 (06 Oct 2018 13:30)  HR: 77 (07 Oct 2018 05:50) (77 - 96)  BP: 125/67 (07 Oct 2018 05:50) (103/58 - 128/80)  BP(mean): --  RR: 18 (07 Oct 2018 05:50) (18 - 20)  SpO2: 97% (07 Oct 2018 05:50) (96% - 100%)    PHYSICAL EXAM  General: NAD  HEENT:  anicteric sclera, pink conjunctiva, Ommaya in place   Neck: supple  CV: (+) S1/S2 RRR  Lungs: clear to auscultation, no wheezes or rales  Abdomen: soft, non-tender, non-distended (+) BS  Ext: no clubbing, cyanosis or edema  Skin: no rashes and no petechiae  Neuro: alert and oriented X 3, no focal deficits  Central Line: Peripheral Line CDI     LABS:                            x      x     )-----------( 61       ( 07 Oct 2018 14:02 )             x            Mean Cell Volume : 100.0 fl  Mean Cell Hemoglobin : 35.2 pg  Mean Cell Hemoglobin Concentration : 35.2 gm/dL  Auto Neutrophil # : x  Auto Lymphocyte # : x  Auto Monocyte # : x  Auto Eosinophil # : x  Auto Basophil # : x  Auto Neutrophil % : x  Auto Lymphocyte % : x  Auto Monocyte % : x  Auto Eosinophil % : x  Auto Basophil % : x      10-07    142  |  112<H>  |  6<L>  ----------------------------<  131<H>  3.5   |  21<L>  |  0.99    Ca    8.3<L>      07 Oct 2018 10:04  Phos  2.1     10-07  Mg     1.8     10-07    TPro  5.6<L>  /  Alb  3.2<L>  /  TBili  0.3  /  DBili  x   /  AST  22  /  ALT  35  /  AlkPhos  143<H>  10-07      Mg 1.8  Phos 2.1  Mg 1.9  Phos 1.9            Uric Acid 4.5      Uric Acid 4.3                                RADIOLOGY & ADDITIONAL STUDIES:      CT Head No Cont (10.04.18 @ 19:47) >  Small bilateral acute on chronic convexity subdural hematomas, as   discussed. No midline shift or herniation.    Additional small areas of subdural hemorrhage within the left parafalcine   region with layering hemorrhage along the left tentorial leaflet, right   anterior frontal convexity, and also adjacent to the right anterior falx.    Disproportionate cerebellar atrophy which appears worsened when compared   to the prior CT examination dated 11/9/2016.    Right frontal approach Ommaya reservoir catheter in place without   evidence of hydrocephalus.    CT Head No Cont (10.05.18 @ 02:24)  Grossly stable subdural hemorrhages.      CT Abdomen and Pelvis No Cont (10.05.18 @ 15:49)   IMPRESSION: No evidence of retroperitoneal bleed.    Cholelithiasis.

## 2018-10-08 DIAGNOSIS — Z71.89 OTHER SPECIFIED COUNSELING: ICD-10-CM

## 2018-10-08 DIAGNOSIS — C91.02 ACUTE LYMPHOBLASTIC LEUKEMIA, IN RELAPSE: ICD-10-CM

## 2018-10-08 LAB
ALBUMIN SERPL ELPH-MCNC: 3.4 G/DL — SIGNIFICANT CHANGE UP (ref 3.3–5)
ALP SERPL-CCNC: 169 U/L — HIGH (ref 40–120)
ALT FLD-CCNC: 47 U/L — HIGH (ref 10–45)
ANION GAP SERPL CALC-SCNC: 12 MMOL/L — SIGNIFICANT CHANGE UP (ref 5–17)
APTT BLD: 28.7 SEC — SIGNIFICANT CHANGE UP (ref 27.5–37.4)
AST SERPL-CCNC: 28 U/L — SIGNIFICANT CHANGE UP (ref 10–40)
BASOPHILS # BLD AUTO: 0 K/UL — SIGNIFICANT CHANGE UP (ref 0–0.2)
BILIRUB SERPL-MCNC: 0.4 MG/DL — SIGNIFICANT CHANGE UP (ref 0.2–1.2)
BLD GP AB SCN SERPL QL: NEGATIVE — SIGNIFICANT CHANGE UP
BUN SERPL-MCNC: 5 MG/DL — LOW (ref 7–23)
CALCIUM SERPL-MCNC: 8.7 MG/DL — SIGNIFICANT CHANGE UP (ref 8.4–10.5)
CHLORIDE SERPL-SCNC: 110 MMOL/L — HIGH (ref 96–108)
CO2 SERPL-SCNC: 21 MMOL/L — LOW (ref 22–31)
CREAT SERPL-MCNC: 0.93 MG/DL — SIGNIFICANT CHANGE UP (ref 0.5–1.3)
EOSINOPHIL # BLD AUTO: 0 K/UL — SIGNIFICANT CHANGE UP (ref 0–0.5)
FIBRINOGEN PPP-MCNC: 389 MG/DL — SIGNIFICANT CHANGE UP (ref 310–510)
GLUCOSE SERPL-MCNC: 112 MG/DL — HIGH (ref 70–99)
HCT VFR BLD CALC: 22 % — LOW (ref 34.5–45)
HGB BLD-MCNC: 7.7 G/DL — LOW (ref 11.5–15.5)
INR BLD: 1.11 RATIO — SIGNIFICANT CHANGE UP (ref 0.88–1.16)
LDH SERPL L TO P-CCNC: 236 U/L — SIGNIFICANT CHANGE UP (ref 50–242)
LYMPHOCYTES # BLD AUTO: 28 % — SIGNIFICANT CHANGE UP (ref 13–44)
LYMPHOCYTES # BLD AUTO: SIGNIFICANT CHANGE UP (ref 1–3.3)
MAGNESIUM SERPL-MCNC: 1.8 MG/DL — SIGNIFICANT CHANGE UP (ref 1.6–2.6)
MCHC RBC-ENTMCNC: 35 GM/DL — SIGNIFICANT CHANGE UP (ref 32–36)
MCHC RBC-ENTMCNC: 35.1 PG — HIGH (ref 27–34)
MCV RBC AUTO: 100 FL — SIGNIFICANT CHANGE UP (ref 80–100)
MONOCYTES # BLD AUTO: SIGNIFICANT CHANGE UP (ref 0–0.9)
NEUTROPHILS # BLD AUTO: 1 K/UL — LOW (ref 1.8–7.4)
NEUTROPHILS NFR BLD AUTO: 0 % — LOW (ref 43–77)
PHOSPHATE SERPL-MCNC: 2 MG/DL — LOW (ref 2.5–4.5)
PLATELET # BLD AUTO: 100 K/UL — LOW (ref 150–400)
PLATELET # BLD AUTO: 58 K/UL — LOW (ref 150–400)
POTASSIUM SERPL-MCNC: 3.4 MMOL/L — LOW (ref 3.5–5.3)
POTASSIUM SERPL-SCNC: 3.4 MMOL/L — LOW (ref 3.5–5.3)
PROT SERPL-MCNC: 5.6 G/DL — LOW (ref 6–8.3)
PROTHROM AB SERPL-ACNC: 12.1 SEC — SIGNIFICANT CHANGE UP (ref 9.8–12.7)
RBC # BLD: 2.19 M/UL — LOW (ref 3.8–5.2)
RBC # FLD: 14.4 % — SIGNIFICANT CHANGE UP (ref 10.3–14.5)
RH IG SCN BLD-IMP: POSITIVE — SIGNIFICANT CHANGE UP
SODIUM SERPL-SCNC: 143 MMOL/L — SIGNIFICANT CHANGE UP (ref 135–145)
TM INTERPRETATION: SIGNIFICANT CHANGE UP
URATE SERPL-MCNC: 4.5 MG/DL — SIGNIFICANT CHANGE UP (ref 2.5–7)
WBC # BLD: 24.3 K/UL — HIGH (ref 3.8–10.5)
WBC # FLD AUTO: 24.3 K/UL — HIGH (ref 3.8–10.5)

## 2018-10-08 PROCEDURE — 99233 SBSQ HOSP IP/OBS HIGH 50: CPT

## 2018-10-08 PROCEDURE — 36569 INSJ PICC 5 YR+ W/O IMAGING: CPT

## 2018-10-08 PROCEDURE — 76937 US GUIDE VASCULAR ACCESS: CPT | Mod: 26

## 2018-10-08 PROCEDURE — 77001 FLUOROGUIDE FOR VEIN DEVICE: CPT | Mod: 26

## 2018-10-08 RX ORDER — POTASSIUM PHOSPHATE, MONOBASIC POTASSIUM PHOSPHATE, DIBASIC 236; 224 MG/ML; MG/ML
15 INJECTION, SOLUTION INTRAVENOUS ONCE
Qty: 0 | Refills: 0 | Status: COMPLETED | OUTPATIENT
Start: 2018-10-08 | End: 2018-10-08

## 2018-10-08 RX ORDER — DIPHENHYDRAMINE HCL 50 MG
50 CAPSULE ORAL ONCE
Qty: 0 | Refills: 0 | Status: COMPLETED | OUTPATIENT
Start: 2018-10-08 | End: 2018-10-08

## 2018-10-08 RX ORDER — ALLOPURINOL 300 MG
300 TABLET ORAL DAILY
Qty: 0 | Refills: 0 | Status: DISCONTINUED | OUTPATIENT
Start: 2018-10-08 | End: 2018-10-09

## 2018-10-08 RX ORDER — POTASSIUM CHLORIDE 20 MEQ
20 PACKET (EA) ORAL
Qty: 0 | Refills: 0 | Status: COMPLETED | OUTPATIENT
Start: 2018-10-08 | End: 2018-10-08

## 2018-10-08 RX ORDER — SODIUM CHLORIDE 9 MG/ML
10 INJECTION INTRAMUSCULAR; INTRAVENOUS; SUBCUTANEOUS
Qty: 0 | Refills: 0 | Status: DISCONTINUED | OUTPATIENT
Start: 2018-10-08 | End: 2018-11-20

## 2018-10-08 RX ORDER — SODIUM CHLORIDE 9 MG/ML
20 INJECTION INTRAMUSCULAR; INTRAVENOUS; SUBCUTANEOUS ONCE
Qty: 0 | Refills: 0 | Status: DISCONTINUED | OUTPATIENT
Start: 2018-10-08 | End: 2018-11-20

## 2018-10-08 RX ORDER — HYDROCORTISONE 20 MG
100 TABLET ORAL ONCE
Qty: 0 | Refills: 0 | Status: COMPLETED | OUTPATIENT
Start: 2018-10-08 | End: 2018-10-08

## 2018-10-08 RX ORDER — HYDROMORPHONE HYDROCHLORIDE 2 MG/ML
1 INJECTION INTRAMUSCULAR; INTRAVENOUS; SUBCUTANEOUS EVERY 4 HOURS
Qty: 0 | Refills: 0 | Status: DISCONTINUED | OUTPATIENT
Start: 2018-10-08 | End: 2018-10-13

## 2018-10-08 RX ORDER — SODIUM CHLORIDE 9 MG/ML
10 INJECTION INTRAMUSCULAR; INTRAVENOUS; SUBCUTANEOUS EVERY 12 HOURS
Qty: 0 | Refills: 0 | Status: DISCONTINUED | OUTPATIENT
Start: 2018-10-08 | End: 2018-11-20

## 2018-10-08 RX ORDER — ACETAMINOPHEN 500 MG
650 TABLET ORAL ONCE
Qty: 0 | Refills: 0 | Status: COMPLETED | OUTPATIENT
Start: 2018-10-08 | End: 2018-10-08

## 2018-10-08 RX ORDER — INOTUZUMAB OZOGAMICIN 0.25 MG/ML
1.72 INJECTION, POWDER, LYOPHILIZED, FOR SOLUTION INTRAVENOUS ONCE
Qty: 0 | Refills: 0 | Status: COMPLETED | OUTPATIENT
Start: 2018-10-08 | End: 2018-10-08

## 2018-10-08 RX ADMIN — PANTOPRAZOLE SODIUM 40 MILLIGRAM(S): 20 TABLET, DELAYED RELEASE ORAL at 12:29

## 2018-10-08 RX ADMIN — Medication 300 MILLIGRAM(S): at 12:56

## 2018-10-08 RX ADMIN — HYDROMORPHONE HYDROCHLORIDE 1 MILLIGRAM(S): 2 INJECTION INTRAMUSCULAR; INTRAVENOUS; SUBCUTANEOUS at 04:29

## 2018-10-08 RX ADMIN — INOTUZUMAB OZOGAMICIN 50 MILLIGRAM(S): 0.25 INJECTION, POWDER, LYOPHILIZED, FOR SOLUTION INTRAVENOUS at 14:42

## 2018-10-08 RX ADMIN — HYDROMORPHONE HYDROCHLORIDE 1 MILLIGRAM(S): 2 INJECTION INTRAMUSCULAR; INTRAVENOUS; SUBCUTANEOUS at 08:45

## 2018-10-08 RX ADMIN — LEVETIRACETAM 500 MILLIGRAM(S): 250 TABLET, FILM COATED ORAL at 06:40

## 2018-10-08 RX ADMIN — HYDROMORPHONE HYDROCHLORIDE 1 MILLIGRAM(S): 2 INJECTION INTRAMUSCULAR; INTRAVENOUS; SUBCUTANEOUS at 18:20

## 2018-10-08 RX ADMIN — Medication 10 MILLIGRAM(S): at 08:17

## 2018-10-08 RX ADMIN — Medication 50 MILLIEQUIVALENT(S): at 15:47

## 2018-10-08 RX ADMIN — POTASSIUM PHOSPHATE, MONOBASIC POTASSIUM PHOSPHATE, DIBASIC 62.5 MILLIMOLE(S): 236; 224 INJECTION, SOLUTION INTRAVENOUS at 20:23

## 2018-10-08 RX ADMIN — POSACONAZOLE 300 MILLIGRAM(S): 100 TABLET, DELAYED RELEASE ORAL at 12:29

## 2018-10-08 RX ADMIN — HYDROMORPHONE HYDROCHLORIDE 1 MILLIGRAM(S): 2 INJECTION INTRAMUSCULAR; INTRAVENOUS; SUBCUTANEOUS at 18:11

## 2018-10-08 RX ADMIN — HYDROMORPHONE HYDROCHLORIDE 1 MILLIGRAM(S): 2 INJECTION INTRAMUSCULAR; INTRAVENOUS; SUBCUTANEOUS at 21:36

## 2018-10-08 RX ADMIN — Medication 1 TABLET(S): at 12:29

## 2018-10-08 RX ADMIN — HYDROMORPHONE HYDROCHLORIDE 1 MILLIGRAM(S): 2 INJECTION INTRAMUSCULAR; INTRAVENOUS; SUBCUTANEOUS at 12:56

## 2018-10-08 RX ADMIN — LEVETIRACETAM 500 MILLIGRAM(S): 250 TABLET, FILM COATED ORAL at 17:42

## 2018-10-08 RX ADMIN — Medication 650 MILLIGRAM(S): at 14:00

## 2018-10-08 RX ADMIN — HYDROMORPHONE HYDROCHLORIDE 1 MILLIGRAM(S): 2 INJECTION INTRAMUSCULAR; INTRAVENOUS; SUBCUTANEOUS at 04:14

## 2018-10-08 RX ADMIN — HYDROMORPHONE HYDROCHLORIDE 1 MILLIGRAM(S): 2 INJECTION INTRAMUSCULAR; INTRAVENOUS; SUBCUTANEOUS at 13:10

## 2018-10-08 RX ADMIN — HYDROMORPHONE HYDROCHLORIDE 1 MILLIGRAM(S): 2 INJECTION INTRAMUSCULAR; INTRAVENOUS; SUBCUTANEOUS at 08:23

## 2018-10-08 RX ADMIN — Medication 650 MILLIGRAM(S): at 13:30

## 2018-10-08 RX ADMIN — Medication 50 MILLIEQUIVALENT(S): at 17:42

## 2018-10-08 RX ADMIN — Medication 50 MILLIGRAM(S): at 13:30

## 2018-10-08 RX ADMIN — HYDROMORPHONE HYDROCHLORIDE 1 MILLIGRAM(S): 2 INJECTION INTRAMUSCULAR; INTRAVENOUS; SUBCUTANEOUS at 22:06

## 2018-10-08 RX ADMIN — Medication 100 MILLIGRAM(S): at 13:30

## 2018-10-08 NOTE — PROGRESS NOTE ADULT - ASSESSMENT
59 yo F with pmh of ALL dx 4/2016 sp chemotherapy (last dose years ago) and ommaya.  Here with persistent HA for the past week, mild nausea.  No vomitting.  CTH showed very small bl acute on chronic sdh, no shift. Small interhemispheric and left tentorial heme. Of note PLT count 2. Was given tordal in ER.    - ddavp/plt x2-3  (goal > 80)  - Continue management per primary team  - neurochecks   - keppra 500 bid    DO NOT TAP OMMAYA GIVEN SDH

## 2018-10-08 NOTE — PROGRESS NOTE ADULT - PROBLEM SELECTOR PLAN 3
10/4 CT  head revealed small bilateral acute on chronic convexity subdural hematomas, 10/5 and 10/6 Repeat CT head stable   s/p DDAVP on 10/4  Keep platelet  >80  Neuro checks q4 hrs  Continue with  Keppra 500mg BID for seizure ppx   Pain management   Neurosurgery following, no intervention at this time 10/4 CT Head revealed small bilateral acute on chronic convexity subdural hematomas. Repeat CT Head on 10/5 and 10/6 is stable   10/4 Given DDAVP   Continue Keppra 500mg BID for seizure ppx   Keep PLT >80K  Neuro checks q4 hrs  Pain management   Neurosurgery following, no intervention at this time- Do not tap Omaya

## 2018-10-08 NOTE — PROGRESS NOTE ADULT - ATTENDING COMMENTS
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 FISH and PCR negative. s/p Masha-Auto on 12/16/16. Pt was on sprycel maintenance. 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     - Awaiting peripheral flow results to confirm relapse.  Plan to start salvage chemotherapy - inotuzumab on Monday.  - cont to keep plt ct >80K, keep hb >7  - cont keppra px  - neutropenic, start levaquin/posa px.  If febrile, begin cefepime.   - mucosal bleeding now resolved, cont oral care  -GI bleed has resolved, d/c protonix gtt and begin IV protonix. 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 FISH and PCR negative. s/p Masha-Auto on 12/16/16. Pt was on sprycel maintenance. 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     - Awaiting peripheral flow results to confirm relapse.  Plan to start salvage chemotherapy - inotuzumab today.  Allopurinol, follow tumor lysis labs.  - PICC line to be placed today.    - cont to keep plt ct >80K, keep hb >7  - cont keppra px.  Repeat head CT tomorrow.  - neutropenic, on levaquin/posa px.  If febrile, begin cefepime.   -GI bleed has resolved, d/c protonix gtt and begin IV protonix.

## 2018-10-08 NOTE — PROGRESS NOTE ADULT - SUBJECTIVE AND OBJECTIVE BOX
Patient seen and examined at bedside.    T(C): 37.3 (10-08-18 @ 05:20), Max: 37.7 (10-07-18 @ 20:03)  HR: 72 (10-08-18 @ 05:20) (72 - 89)  BP: 130/82 (10-08-18 @ 05:20) (103/67 - 144/81)  RR: 18 (10-08-18 @ 05:20) (18 - 19)  SpO2: 95% (10-08-18 @ 05:20) (94% - 99%)  Wt(kg): --    Exam:  AOx3, FC, PERRL, EOMI, no facial   5/5 throughout, new RUE drift  SILT  no

## 2018-10-08 NOTE — PROGRESS NOTE ADULT - PROBLEM SELECTOR PLAN 4
Resolved   Follow up stool for occult blood   Discontinue  Protonix drip on 10/7  Continue Protonix 40 mg IVP daily No pharmacologic ppx 2/2 thrombocytopenia and SDH

## 2018-10-08 NOTE — ADVANCED PRACTICE NURSE CONSULT - ASSESSMENT
Arrived to find patient in bed, awake and oriented times four. Patient s/p Right double lumen right arm PICC. PICC line previously accessed, remain with + blood return and flushes easily, dressing dry and intact, no signs of bleeding or swelling noted. Chemotherapy teaching done, patient verbalized understanding, also given drug card which outlines side effects. Lab values were reviewed by Dr. Logan during rounds. Patient was pre-medicated with Tylenol 650mg PO, Benadryl 50mg IV, Hydrocortisone 100mg IV. One hour after pre-medications patient was given Inotuzumab 0.8mg/m2=1.72mg IV as ordered. Patient was monitored during infusion, no reactions noted. Left patient in bed sleeping, report given to primary nurse.

## 2018-10-08 NOTE — PROGRESS NOTE ADULT - PROBLEM SELECTOR PLAN 1
10/5 follow up flow  Monitor CBC/Lytes and transfuse/replete PRN  hypophosphatemia: Potassium phosphate 15 mMole IVx 1  Strict Is and Os/Daily weights/Mouth Care  IVF  Antiemetics  Allopurinol 300mg PO daily  PICC placement on 10/7  Plan to start salvage chemotherapy - lintuzumab on 10/8 10/5 Peripheral flow to confirm relapse sent  Start Inotuzamab today   Monitor CBC/Lytes and transfuse/replete PRN  Strict Is and Os/Daily weights/Mouth Care  Allopurinol 300mg PO daily  Antiemetics  IVF 10/5 Peripheral flow to confirm relapse sent  Start Inotuzamab today   Monitor CBC/Lytes and transfuse/replete PRN  Hypokalemia: KCL 20 mEq IV x 2  Hypophosphatemia: K Phos 15 mmol x 1 with repeat level after repletion  Strict Is and Os/Daily weights/Mouth Care  Allopurinol 300mg PO daily  Antiemetics  IVF 10/5 Peripheral flow confirms relapse   Start Inotuzamab today   Monitor CBC/Lytes and transfuse/replete PRN  Hypokalemia: KCL 20 mEq IV x 2  Hypophosphatemia: K Phos 15 mmol x 1 with repeat level after repletion  Strict Is and Os/Daily weights/Mouth Care  Allopurinol 300mg PO daily  Antiemetics  IVF

## 2018-10-08 NOTE — PROGRESS NOTE ADULT - SUBJECTIVE AND OBJECTIVE BOX
Interventional Radiology     Pre- Procedure   Patient and/or family/surrogate educated about central line-associated blood stream infection prevention practices  Central Line insertion checklist utilized    Catheter Type: (  ) Dialysis  (  ) Introducer  (   ) Central venous catheter   ( x ) peripherally inserted central catheter (PICC)      Number of Lumens: (  ) single   ( x ) Double   (   ) Triple  (  ) Antimicrobial catheter    TIME OUT performed prior to this procedure with verbal confirmation of correct patient identity, correct site, side and rhina (if applicable), agreement of the procedure, correct patient position, availability of necessary equipment.  The consent form (if applicable) is complete and accurate.  Safety precautions based on patient history or medication use has been addressed   RN at bedside    Procedure  The patient was placed in the ( x )Supine position, (   ) Trendelenburg postiton.  The patient's placement site was prepped with Chlorhexidine solution then drapped using maximum sterile barrier protection.  The area was injected with 8cc of 2% Lidocaine.  Using the  (  ) Seldinger technique    (x  ) Modified Seldinger technique   ( x ) Ultrasound, the catheter was introduced.  Strict adherence to outlined aseptic technique, including hand washing prior to donning sterile barriers (gloves and gown), utilizing a mask and cap, plus draping the patient with a sterile drape was observed.  Uponcompletion of the line placement, the insertion site was covered with sterile dressing.    All materials used for catheter insertion, including the intact guide wire, were accounted for at the end of the procedure      Emergency placement (x  ) No    (   ) Yes   (   x) New Site    (   )  Guide Wire change      Attempted site and actual site ( x  ) Right  (   )  Left                 Brachial vein, 36cm in length         Post Procedure (Catheter Placement Verification)  Correct placement confirmed by pressure transducer or ultrasonography or venous saturation  The tip of the catheter is confirmed to be in the SVC by radiography which revealed no pneumothorax and line may be accessed

## 2018-10-08 NOTE — PROGRESS NOTE ADULT - ASSESSMENT
This is a 57 yo F with PMHx of HTN, Obesity and ALL Ph(+) status post 4 cycles of  R-HyperCVAD with IT MTX x2 followed by Autologous HPSCT on 12/16/16 then on Sprycel maintenance admitted for relapsed disease and subdural hematoma. This is a 57 yo F with PMHx of HTN, Obesity and ALL Ph(+) status post 4 cycles of R-HyperCVAD with IT MTX x2 (via Omaya) followed by Autologous HPSCT on 12/16/16 then on Sprycel maintenance admitted for relapsed disease and subdural hematoma. The patients hospital course has been complicated by SDH and upper GIB.

## 2018-10-08 NOTE — PROGRESS NOTE ADULT - PROBLEM SELECTOR PLAN 2
Pt is neutropenic, afebrile  Continue Levaquin and Posaconazole   If spikes pancx CXR and Change Levaquin to Cefepime  10/5 CT abd cholelithiases, no source of infection  10/4 QuantiFeron gold (-) Pt is neutropenic, afebrile  Continue Levaquin and Posaconazole   If febrile Pan Cx, CXR and change Levaquin to Cefepime  10/5 CT A&P revealing cholelithiases, no source of infection  10/4 QuantiFeron gold (-)

## 2018-10-08 NOTE — PROGRESS NOTE ADULT - SUBJECTIVE AND OBJECTIVE BOX
Interventional Radiology Pre-Procedure Note    This is a 58y Female with PMH of ALL who was admitted with for subdural hematoma and ALL relapse. Pt requiring IV access for chemotherapy. IR requested for placement of double lumen PICC. Pt afebrile with neg blood cultures 10/4.     PAST MEDICAL & SURGICAL HISTORY:  Herpes zoster  Leukemia  Clostridium difficile diarrhea  Intractable migraine with status migrainosus, unspecified migraine type  Sciatica of right side  Chronic gastritis, presence of bleeding unspecified, unspecified gastritis type  Essential hypertension  Lipoma: left side  Elective surgical procedure: ommaya placement  History of  delivery     Vital Signs Last 24 Hrs  T(C): 37.3 (08 Oct 2018 05:20), Max: 37.7 (07 Oct 2018 20:03)  T(F): 99.1 (08 Oct 2018 05:20), Max: 99.8 (07 Oct 2018 20:03)  HR: 72 (08 Oct 2018 05:20) (72 - 89)  BP: 130/82 (08 Oct 2018 05:20) (103/67 - 144/81)  BP(mean): --  RR: 18 (08 Oct 2018 05:20) (18 - 19)  SpO2: 95% (08 Oct 2018 05:20) (94% - 99%)    Allergies: No Known Drug Allergies  shellfish (Nausea; Vomiting)    Physical Exam: Gen:    Labs:                         8.3      10.8)-----------( 73       ( 07 Oct 2018 23:28 )             23.7        10    142  |  112<H>  |  6<L>  ----------------------------<  131<H>  3.5   |  21<L>  |  0.99    Ca    8.3<L>      07 Oct 2018 10:04  Phos  2.1     10  Mg     1.8     10-07    TPro  5.6<L>  /  Alb  3.2<L>  /  TBili  0.3  /  DBili  x   /  AST  22  /  ALT  35  /  AlkPhos  143<H>  10-07      Plan is for placement of double lumen PICC. The full procedure/ risks/ benefits/ alternatives were discussed with the pt and Informed consent obtained. All questions and concerns have been addressed at this time. Interventional Radiology Pre-Procedure Note    This is a 58y Female with PMH of ALL who was admitted with for subdural hematoma and ALL relapse. Pt requiring IV access for chemotherapy. IR requested for placement of double lumen PICC. Pt afebrile with neg blood cultures 10/4.     PAST MEDICAL & SURGICAL HISTORY:  Herpes zoster  Leukemia  Clostridium difficile diarrhea  Intractable migraine with status migrainosus, unspecified migraine type  Sciatica of right side  Chronic gastritis, presence of bleeding unspecified, unspecified gastritis type  Essential hypertension  Lipoma: left side  Elective surgical procedure: ommaya placement  History of  delivery     Vital Signs Last 24 Hrs  T(C): 37.3 (08 Oct 2018 05:20), Max: 37.7 (07 Oct 2018 20:03)  T(F): 99.1 (08 Oct 2018 05:20), Max: 99.8 (07 Oct 2018 20:03)  HR: 72 (08 Oct 2018 05:20) (72 - 89)  BP: 130/82 (08 Oct 2018 05:20) (103/67 - 144/81)  BP(mean): --  RR: 18 (08 Oct 2018 05:20) (18 - 19)  SpO2: 95% (08 Oct 2018 05:20) (94% - 99%)    Allergies: No Known Drug Allergies  shellfish (Nausea; Vomiting)    Physical Exam: Gen: NAD, A&Ox3    Labs:                         8.3      10.8)-----------( 73       ( 07 Oct 2018 23:28 )             23.7        10    142  |  112<H>  |  6<L>  ----------------------------<  131<H>  3.5   |  21<L>  |  0.99    Ca    8.3<L>      07 Oct 2018 10:04  Phos  2.1     10-07  Mg     1.8     10    TPro  5.6<L>  /  Alb  3.2<L>  /  TBili  0.3  /  DBili  x   /  AST  22  /  ALT  35  /  AlkPhos  143<H>  10-07      Plan is for placement of double lumen PICC. The full procedure/ risks/ benefits/ alternatives were discussed with the pt and Informed consent obtained. All questions and concerns have been addressed at this time.

## 2018-10-09 DIAGNOSIS — R74.0 NONSPECIFIC ELEVATION OF LEVELS OF TRANSAMINASE AND LACTIC ACID DEHYDROGENASE [LDH]: ICD-10-CM

## 2018-10-09 LAB
ALBUMIN SERPL ELPH-MCNC: 3.2 G/DL — LOW (ref 3.3–5)
ALBUMIN SERPL ELPH-MCNC: 3.3 G/DL — SIGNIFICANT CHANGE UP (ref 3.3–5)
ALBUMIN SERPL ELPH-MCNC: 3.8 G/DL — SIGNIFICANT CHANGE UP (ref 3.3–5)
ALP SERPL-CCNC: 155 U/L — HIGH (ref 40–120)
ALP SERPL-CCNC: 171 U/L — HIGH (ref 40–120)
ALP SERPL-CCNC: 175 U/L — HIGH (ref 40–120)
ALT FLD-CCNC: 51 U/L — HIGH (ref 10–45)
ALT FLD-CCNC: 57 U/L — HIGH (ref 10–45)
ALT FLD-CCNC: 60 U/L — HIGH (ref 10–45)
ANION GAP SERPL CALC-SCNC: 12 MMOL/L — SIGNIFICANT CHANGE UP (ref 5–17)
ANION GAP SERPL CALC-SCNC: 13 MMOL/L — SIGNIFICANT CHANGE UP (ref 5–17)
ANION GAP SERPL CALC-SCNC: 15 MMOL/L — SIGNIFICANT CHANGE UP (ref 5–17)
APPEARANCE UR: CLEAR — SIGNIFICANT CHANGE UP
AST SERPL-CCNC: 46 U/L — HIGH (ref 10–40)
AST SERPL-CCNC: 48 U/L — HIGH (ref 10–40)
AST SERPL-CCNC: 52 U/L — HIGH (ref 10–40)
BACTERIA # UR AUTO: NEGATIVE — SIGNIFICANT CHANGE UP
BASOPHILS # BLD AUTO: 0 K/UL — SIGNIFICANT CHANGE UP (ref 0–0.2)
BILIRUB SERPL-MCNC: 0.6 MG/DL — SIGNIFICANT CHANGE UP (ref 0.2–1.2)
BILIRUB SERPL-MCNC: 0.6 MG/DL — SIGNIFICANT CHANGE UP (ref 0.2–1.2)
BILIRUB SERPL-MCNC: 0.8 MG/DL — SIGNIFICANT CHANGE UP (ref 0.2–1.2)
BILIRUB UR-MCNC: NEGATIVE — SIGNIFICANT CHANGE UP
BUN SERPL-MCNC: 16 MG/DL — SIGNIFICANT CHANGE UP (ref 7–23)
BUN SERPL-MCNC: 18 MG/DL — SIGNIFICANT CHANGE UP (ref 7–23)
BUN SERPL-MCNC: 20 MG/DL — SIGNIFICANT CHANGE UP (ref 7–23)
CALCIUM SERPL-MCNC: 7.8 MG/DL — LOW (ref 8.4–10.5)
CALCIUM SERPL-MCNC: 7.9 MG/DL — LOW (ref 8.4–10.5)
CALCIUM SERPL-MCNC: 8.6 MG/DL — SIGNIFICANT CHANGE UP (ref 8.4–10.5)
CHLORIDE SERPL-SCNC: 106 MMOL/L — SIGNIFICANT CHANGE UP (ref 96–108)
CHLORIDE SERPL-SCNC: 108 MMOL/L — SIGNIFICANT CHANGE UP (ref 96–108)
CHLORIDE SERPL-SCNC: 112 MMOL/L — HIGH (ref 96–108)
CO2 SERPL-SCNC: 18 MMOL/L — LOW (ref 22–31)
CO2 SERPL-SCNC: 20 MMOL/L — LOW (ref 22–31)
CO2 SERPL-SCNC: 21 MMOL/L — LOW (ref 22–31)
COLOR SPEC: YELLOW — SIGNIFICANT CHANGE UP
CREAT SERPL-MCNC: 0.94 MG/DL — SIGNIFICANT CHANGE UP (ref 0.5–1.3)
CREAT SERPL-MCNC: 1.08 MG/DL — SIGNIFICANT CHANGE UP (ref 0.5–1.3)
CREAT SERPL-MCNC: 1.11 MG/DL — SIGNIFICANT CHANGE UP (ref 0.5–1.3)
CULTURE RESULTS: SIGNIFICANT CHANGE UP
CULTURE RESULTS: SIGNIFICANT CHANGE UP
DIFF PNL FLD: ABNORMAL
EOSINOPHIL # BLD AUTO: 0 K/UL — SIGNIFICANT CHANGE UP (ref 0–0.5)
EPI CELLS # UR: 1 /HPF — SIGNIFICANT CHANGE UP
GLUCOSE SERPL-MCNC: 121 MG/DL — HIGH (ref 70–99)
GLUCOSE SERPL-MCNC: 131 MG/DL — HIGH (ref 70–99)
GLUCOSE SERPL-MCNC: 173 MG/DL — HIGH (ref 70–99)
GLUCOSE UR QL: NEGATIVE — SIGNIFICANT CHANGE UP
HCT VFR BLD CALC: 20.6 % — CRITICAL LOW (ref 34.5–45)
HGB BLD-MCNC: 6.8 G/DL — CRITICAL LOW (ref 11.5–15.5)
HYALINE CASTS # UR AUTO: 0 /LPF — SIGNIFICANT CHANGE UP (ref 0–2)
KETONES UR-MCNC: NEGATIVE — SIGNIFICANT CHANGE UP
LDH SERPL L TO P-CCNC: 394 U/L — HIGH (ref 50–242)
LEUKOCYTE ESTERASE UR-ACNC: NEGATIVE — SIGNIFICANT CHANGE UP
LYMPHOCYTES # BLD AUTO: 46 % — HIGH (ref 13–44)
LYMPHOCYTES # BLD AUTO: SIGNIFICANT CHANGE UP (ref 1–3.3)
MAGNESIUM SERPL-MCNC: 1.6 MG/DL — SIGNIFICANT CHANGE UP (ref 1.6–2.6)
MAGNESIUM SERPL-MCNC: 1.9 MG/DL — SIGNIFICANT CHANGE UP (ref 1.6–2.6)
MAGNESIUM SERPL-MCNC: 1.9 MG/DL — SIGNIFICANT CHANGE UP (ref 1.6–2.6)
MCHC RBC-ENTMCNC: 32.8 PG — SIGNIFICANT CHANGE UP (ref 27–34)
MCHC RBC-ENTMCNC: 33 GM/DL — SIGNIFICANT CHANGE UP (ref 32–36)
MCV RBC AUTO: 99.4 FL — SIGNIFICANT CHANGE UP (ref 80–100)
MONOCYTES # BLD AUTO: SIGNIFICANT CHANGE UP (ref 0–0.9)
NEUTROPHILS # BLD AUTO: 0.1 K/UL — LOW (ref 1.8–7.4)
NEUTROPHILS NFR BLD AUTO: 2 % — LOW (ref 43–77)
NITRITE UR-MCNC: NEGATIVE — SIGNIFICANT CHANGE UP
PH UR: 6 — SIGNIFICANT CHANGE UP (ref 5–8)
PHOSPHATE SERPL-MCNC: 3.3 MG/DL — SIGNIFICANT CHANGE UP (ref 2.5–4.5)
PHOSPHATE SERPL-MCNC: 4.1 MG/DL — SIGNIFICANT CHANGE UP (ref 2.5–4.5)
PHOSPHATE SERPL-MCNC: 4.4 MG/DL — SIGNIFICANT CHANGE UP (ref 2.5–4.5)
PHOSPHATE SERPL-MCNC: 5 MG/DL — HIGH (ref 2.5–4.5)
PLATELET # BLD AUTO: 23 K/UL — LOW (ref 150–400)
PLATELET # BLD AUTO: 39 K/UL — LOW (ref 150–400)
PLATELET # BLD AUTO: 43 K/UL — LOW (ref 150–400)
POTASSIUM SERPL-MCNC: 3.6 MMOL/L — SIGNIFICANT CHANGE UP (ref 3.5–5.3)
POTASSIUM SERPL-MCNC: 3.8 MMOL/L — SIGNIFICANT CHANGE UP (ref 3.5–5.3)
POTASSIUM SERPL-MCNC: 4 MMOL/L — SIGNIFICANT CHANGE UP (ref 3.5–5.3)
POTASSIUM SERPL-SCNC: 3.6 MMOL/L — SIGNIFICANT CHANGE UP (ref 3.5–5.3)
POTASSIUM SERPL-SCNC: 3.8 MMOL/L — SIGNIFICANT CHANGE UP (ref 3.5–5.3)
POTASSIUM SERPL-SCNC: 4 MMOL/L — SIGNIFICANT CHANGE UP (ref 3.5–5.3)
PROT SERPL-MCNC: 5.2 G/DL — LOW (ref 6–8.3)
PROT SERPL-MCNC: 5.2 G/DL — LOW (ref 6–8.3)
PROT SERPL-MCNC: 6.1 G/DL — SIGNIFICANT CHANGE UP (ref 6–8.3)
PROT UR-MCNC: ABNORMAL
RBC # BLD: 2.07 M/UL — LOW (ref 3.8–5.2)
RBC # FLD: 14 % — SIGNIFICANT CHANGE UP (ref 10.3–14.5)
RBC CASTS # UR COMP ASSIST: 2 /HPF — SIGNIFICANT CHANGE UP (ref 0–4)
SODIUM SERPL-SCNC: 140 MMOL/L — SIGNIFICANT CHANGE UP (ref 135–145)
SODIUM SERPL-SCNC: 142 MMOL/L — SIGNIFICANT CHANGE UP (ref 135–145)
SODIUM SERPL-SCNC: 143 MMOL/L — SIGNIFICANT CHANGE UP (ref 135–145)
SP GR SPEC: 1.02 — SIGNIFICANT CHANGE UP (ref 1.01–1.02)
SPECIMEN SOURCE: SIGNIFICANT CHANGE UP
SPECIMEN SOURCE: SIGNIFICANT CHANGE UP
URATE SERPL-MCNC: 6 MG/DL — SIGNIFICANT CHANGE UP (ref 2.5–7)
URATE SERPL-MCNC: 6.1 MG/DL — SIGNIFICANT CHANGE UP (ref 2.5–7)
URATE SERPL-MCNC: 7 MG/DL — SIGNIFICANT CHANGE UP (ref 2.5–7)
UROBILINOGEN FLD QL: NEGATIVE — SIGNIFICANT CHANGE UP
WBC # BLD: 1.8 K/UL — LOW (ref 3.8–10.5)
WBC # FLD AUTO: 1.8 K/UL — LOW (ref 3.8–10.5)
WBC UR QL: 1 /HPF — SIGNIFICANT CHANGE UP (ref 0–5)

## 2018-10-09 PROCEDURE — 99232 SBSQ HOSP IP/OBS MODERATE 35: CPT

## 2018-10-09 PROCEDURE — 71045 X-RAY EXAM CHEST 1 VIEW: CPT | Mod: 26

## 2018-10-09 RX ORDER — PANTOPRAZOLE SODIUM 20 MG/1
40 TABLET, DELAYED RELEASE ORAL
Qty: 0 | Refills: 0 | Status: DISCONTINUED | OUTPATIENT
Start: 2018-10-09 | End: 2018-11-20

## 2018-10-09 RX ORDER — ACETAMINOPHEN 500 MG
1000 TABLET ORAL ONCE
Qty: 0 | Refills: 0 | Status: COMPLETED | OUTPATIENT
Start: 2018-10-09 | End: 2018-10-09

## 2018-10-09 RX ORDER — CEFEPIME 1 G/1
2000 INJECTION, POWDER, FOR SOLUTION INTRAMUSCULAR; INTRAVENOUS ONCE
Qty: 0 | Refills: 0 | Status: COMPLETED | OUTPATIENT
Start: 2018-10-09 | End: 2018-10-09

## 2018-10-09 RX ORDER — CEFEPIME 1 G/1
INJECTION, POWDER, FOR SOLUTION INTRAMUSCULAR; INTRAVENOUS
Qty: 0 | Refills: 0 | Status: DISCONTINUED | OUTPATIENT
Start: 2018-10-09 | End: 2018-10-17

## 2018-10-09 RX ORDER — CEFEPIME 1 G/1
2000 INJECTION, POWDER, FOR SOLUTION INTRAMUSCULAR; INTRAVENOUS EVERY 8 HOURS
Qty: 0 | Refills: 0 | Status: DISCONTINUED | OUTPATIENT
Start: 2018-10-09 | End: 2018-10-17

## 2018-10-09 RX ORDER — FUROSEMIDE 40 MG
40 TABLET ORAL ONCE
Qty: 0 | Refills: 0 | Status: COMPLETED | OUTPATIENT
Start: 2018-10-09 | End: 2018-10-09

## 2018-10-09 RX ORDER — ALLOPURINOL 300 MG
300 TABLET ORAL DAILY
Qty: 0 | Refills: 0 | Status: COMPLETED | OUTPATIENT
Start: 2018-10-10 | End: 2018-10-17

## 2018-10-09 RX ORDER — HYDROMORPHONE HYDROCHLORIDE 2 MG/ML
1 INJECTION INTRAMUSCULAR; INTRAVENOUS; SUBCUTANEOUS ONCE
Qty: 0 | Refills: 0 | Status: DISCONTINUED | OUTPATIENT
Start: 2018-10-09 | End: 2018-10-09

## 2018-10-09 RX ADMIN — HYDROMORPHONE HYDROCHLORIDE 1 MILLIGRAM(S): 2 INJECTION INTRAMUSCULAR; INTRAVENOUS; SUBCUTANEOUS at 15:33

## 2018-10-09 RX ADMIN — HYDROMORPHONE HYDROCHLORIDE 1 MILLIGRAM(S): 2 INJECTION INTRAMUSCULAR; INTRAVENOUS; SUBCUTANEOUS at 01:11

## 2018-10-09 RX ADMIN — HYDROMORPHONE HYDROCHLORIDE 1 MILLIGRAM(S): 2 INJECTION INTRAMUSCULAR; INTRAVENOUS; SUBCUTANEOUS at 15:53

## 2018-10-09 RX ADMIN — Medication 1 TABLET(S): at 11:56

## 2018-10-09 RX ADMIN — Medication 300 MILLIGRAM(S): at 11:56

## 2018-10-09 RX ADMIN — LEVETIRACETAM 500 MILLIGRAM(S): 250 TABLET, FILM COATED ORAL at 05:46

## 2018-10-09 RX ADMIN — HYDROMORPHONE HYDROCHLORIDE 1 MILLIGRAM(S): 2 INJECTION INTRAMUSCULAR; INTRAVENOUS; SUBCUTANEOUS at 19:36

## 2018-10-09 RX ADMIN — PANTOPRAZOLE SODIUM 40 MILLIGRAM(S): 20 TABLET, DELAYED RELEASE ORAL at 11:56

## 2018-10-09 RX ADMIN — LEVETIRACETAM 500 MILLIGRAM(S): 250 TABLET, FILM COATED ORAL at 18:02

## 2018-10-09 RX ADMIN — CEFEPIME 100 MILLIGRAM(S): 1 INJECTION, POWDER, FOR SOLUTION INTRAMUSCULAR; INTRAVENOUS at 03:32

## 2018-10-09 RX ADMIN — HYDROMORPHONE HYDROCHLORIDE 1 MILLIGRAM(S): 2 INJECTION INTRAMUSCULAR; INTRAVENOUS; SUBCUTANEOUS at 03:40

## 2018-10-09 RX ADMIN — HYDROMORPHONE HYDROCHLORIDE 1 MILLIGRAM(S): 2 INJECTION INTRAMUSCULAR; INTRAVENOUS; SUBCUTANEOUS at 10:24

## 2018-10-09 RX ADMIN — CEFEPIME 100 MILLIGRAM(S): 1 INJECTION, POWDER, FOR SOLUTION INTRAMUSCULAR; INTRAVENOUS at 23:12

## 2018-10-09 RX ADMIN — POSACONAZOLE 300 MILLIGRAM(S): 100 TABLET, DELAYED RELEASE ORAL at 11:56

## 2018-10-09 RX ADMIN — SODIUM CHLORIDE 100 MILLILITER(S): 9 INJECTION INTRAMUSCULAR; INTRAVENOUS; SUBCUTANEOUS at 05:46

## 2018-10-09 RX ADMIN — HYDROMORPHONE HYDROCHLORIDE 1 MILLIGRAM(S): 2 INJECTION INTRAMUSCULAR; INTRAVENOUS; SUBCUTANEOUS at 20:06

## 2018-10-09 RX ADMIN — HYDROMORPHONE HYDROCHLORIDE 1 MILLIGRAM(S): 2 INJECTION INTRAMUSCULAR; INTRAVENOUS; SUBCUTANEOUS at 10:39

## 2018-10-09 RX ADMIN — CEFEPIME 100 MILLIGRAM(S): 1 INJECTION, POWDER, FOR SOLUTION INTRAMUSCULAR; INTRAVENOUS at 13:39

## 2018-10-09 RX ADMIN — Medication 10 MILLIGRAM(S): at 05:46

## 2018-10-09 RX ADMIN — SODIUM CHLORIDE 100 MILLILITER(S): 9 INJECTION INTRAMUSCULAR; INTRAVENOUS; SUBCUTANEOUS at 18:02

## 2018-10-09 RX ADMIN — HYDROMORPHONE HYDROCHLORIDE 1 MILLIGRAM(S): 2 INJECTION INTRAMUSCULAR; INTRAVENOUS; SUBCUTANEOUS at 01:41

## 2018-10-09 RX ADMIN — HYDROMORPHONE HYDROCHLORIDE 1 MILLIGRAM(S): 2 INJECTION INTRAMUSCULAR; INTRAVENOUS; SUBCUTANEOUS at 04:24

## 2018-10-09 RX ADMIN — Medication 400 MILLIGRAM(S): at 00:50

## 2018-10-09 RX ADMIN — Medication 40 MILLIGRAM(S): at 18:02

## 2018-10-09 NOTE — PROGRESS NOTE ADULT - ATTENDING COMMENTS
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 FISH and PCR negative. s/p Masha-Auto on 12/16/16. Pt was on sprycel maintenance. 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     - Awaiting peripheral flow results to confirm relapse.  Plan to start salvage chemotherapy - inotuzumab today.  Allopurinol, follow tumor lysis labs.  - PICC line to be placed today.    - cont to keep plt ct >80K, keep hb >7  - cont keppra px.  Repeat head CT tomorrow.  - neutropenic, on levaquin/posa px.  If febrile, begin cefepime.   -GI bleed has resolved, d/c protonix gtt and begin IV protonix. 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 FISH and PCR negative. s/p Masha-Auto on 12/16/16. Pt was on sprycel maintenance. 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     - Inotuzumab- day 2.    - cont to keep plt ct >80K, keep hb >7  - cont keppra px.  Repeat head CT Thursday/Friday  - neutropenic, febrile, likely due to the inotuzumab.  Follow up cultures, continue cefepime/posaconazole.  -GI bleed has resolved, continue IV protonix.

## 2018-10-09 NOTE — PROGRESS NOTE ADULT - PROBLEM SELECTOR PLAN 2
Pt is neutropenic, afebrile  Continue Cefepime and Posaconazole   If febrile Pan Cx and CXR  10/5 CT A&P revealing cholelithiases, no source of infection  10/4 QuantiFeron gold (-)

## 2018-10-09 NOTE — PROGRESS NOTE ADULT - ASSESSMENT
This is a 57 yo F with PMHx of HTN, Obesity and ALL Ph(+) status post 4 cycles of R-HyperCVAD with IT MTX x2 (via Omaya) followed by Autologous HPSCT on 12/16/16 then on Sprycel maintenance admitted for relapsed disease and subdural hematoma. The patients hospital course has been complicated by SDH and upper GIB. This is a 57 yo F with PMHx of HTN, Obesity and ALL Ph(+) status post 4 cycles of R-HyperCVAD with IT MTX x2 (via Omaya) followed by Autologous HPSCT on 12/16/16 then on Sprycel maintenance admitted for relapsed disease and subdural hematoma. The patients hospital course has been complicated by SDH, transaminitis and upper GIB. 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 The patients hospital course has been complicated by SDH, transaminitis and upper GIB.

## 2018-10-09 NOTE — PROGRESS NOTE ADULT - PROBLEM SELECTOR PLAN 1
10/5 Peripheral flow confirms relapse   Start Inotuzamab today   Monitor CBC/Lytes and transfuse/replete PRN  Hypokalemia: KCL 20 mEq IV x 2  Hypophosphatemia: K Phos 15 mmol x 1 with repeat level after repletion  Strict Is and Os/Daily weights/Mouth Care  Allopurinol 300mg PO daily  Antiemetics  IVF 10/5 Peripheral flow confirms relapse   Continue Inotuzamab (Day 1, 5 and 8)  Monitor CBC/Lytes and transfuse/replete PRN  Anemia: 1 unit PRBCs x 1  Thrombocytopenia with SDH: 1 bag PLT with repeat level after  Strict Is and Os/Daily weights/Mouth Care  Allopurinol 300mg PO daily  Antiemetics  IVF 10/5 Peripheral flow confirms relapse   Continue Inotuzamab (Day 1, 5 and 8)  Monitor CBC/Lytes and transfuse/replete PRN  Anemia: 1 unit PRBCs x 1  Thrombocytopenia with SDH: 1 bag PLT with repeat level after  Strict Is and Os/Daily weights/Mouth Care  Lasix 40mg IV x 1  Allopurinol 300mg PO daily  Antiemetics  IVF 10/5 Peripheral flow confirms relapse   Continue Inotuzamab (Day 1, 8 and 15)  Monitor CBC/Lytes and transfuse/replete PRN  Anemia: 1 unit PRBCs x 1  Thrombocytopenia with SDH: 1 bag PLT with repeat level after  Strict Is and Os/Daily weights/Mouth Care  Lasix 40mg IV x 1  Allopurinol 300mg PO daily  Antiemetics  IVF

## 2018-10-09 NOTE — PROGRESS NOTE ADULT - PROBLEM SELECTOR PLAN 3
10/4 CT Head revealed small bilateral acute on chronic convexity subdural hematomas. Repeat CT Head on 10/5 and 10/6 is stable   10/4 Given DDAVP   Continue Keppra 500mg BID for seizure ppx   Keep PLT >80K  Neuro checks q4 hrs  Pain management   Neurosurgery following, no intervention at this time- Do not tap [facility] 10/4 CT Head revealed small bilateral acute on chronic convexity subdural hematomas. Repeat CT Head on 10/5 and 10/6 is stable   Will repeat CT Head on 10/11  10/4 Given DDAVP   Continue Keppra 500mg BID for seizure ppx   Keep PLT >80K  Neuro checks q4 hrs  Pain management   Neurosurgery following, no intervention at this time- Do not tap [facility]

## 2018-10-09 NOTE — PROGRESS NOTE ADULT - PROBLEM SELECTOR PLAN 4
No pharmacologic ppx 2/2 thrombocytopenia and SDH Mild increase today likely 2/2 chemotherapy and/or disease  Monitor daily CMP  May need to change Posaconazole to Mycamine, will monitor at this time  Avoid hepatotoxic medications Mild   Likely 2/2 chemotherapy and/or disease  Monitor daily CMP  May need to change Posaconazole to Mycamine, will monitor at this time  Avoid hepatotoxic medications

## 2018-10-09 NOTE — CHART NOTE - NSCHARTNOTEFT_GEN_A_CORE
C/C: Called for temp 104.5. Pt seen at bedside c/o ongoing headaches and associated nausea, requesting more Dilaudid. Patient denies chest pain, shortness of breath, palpitations, dizziness, headaches, visual changes, N/V, diarrhea, abdominal pain, BRBPR, dysuria or hematuria.      VITAL SIGNS:  T(C): 38.6 (10-09-18 @ 02:39), Max: 40.3 (10-09-18 @ 00:28)  HR: 108 (10-09-18 @ 02:52)  BP: 129/82 (10-09-18 @ 02:39)  RR: 18 (10-09-18 @ 02:39)  SpO2: 94% (10-09-18 @ 02:39)          LABS:                        x      x     )-----------( 100      ( 08 Oct 2018 18:42 )             x        10-08    143  |  110<H>  |  5<L>  ----------------------------<  112<H>  3.4<L>   |  21<L>  |  0.93    Ca    8.7      08 Oct 2018 13:09  Phos  3.3     10-09  Mg     1.8     10-08    TPro  5.6<L>  /  Alb  3.4  /  TBili  0.4  /  DBili  x   /  AST  28  /  ALT  47<H>  /  AlkPhos  169<H>  10-08  PT/INR - ( 08 Oct 2018 13:09 )   PT: 12.1 sec;   INR: 1.11 ratio    PTT - ( 08 Oct 2018 13:09 )  PTT:28.7 sec         CULTURES:  Blood Culture Results:   No growth to date. (10.04.18 @ 22:09)    Blood Culture Results:   No growth to date. (10.04.18 @ 22:09)    Urine Culture Results:   No growth (01.02.17 @ 19:31)      RADIOLOGY RESULTS:  < from: CT Head No Cont (10.06.18 @ 12:23) >      Stable subdural hematomas are seen involving the left frontal parietal   and interhemispheric region. Subdural hematoma along the left tentorial   region is also again seen. These findings appear unchanged in size and   configuration as well as attenuation. These findings have acute, subacute   and chronic components. The subdural hematoma in the interhemispheric   region measures approximately 0.7 cm widest diameter and previously   measured approximately 0.6 times widest diameter.    Right frontal nancy hole with associated catheter is again seen and   unchanged in position when    The size and configuration of the ventricles appear unchanged in size   with no evidence of hydrocephalus seen.    Parenchymal volume loss in posterior fossa region is again seen out of   proportion to the rest of the brain.    No new areas of acute hemorrhage is seen.    Evaluation of the osseous structures with the appropriate window aside   from postop changes appears unremarkable.    Minimal right maxillary sinus mucosal thickening is seen.    Both mastoid and middle ear regions appear clear    Impression: No significant change when allowing for differences in   technique.    < end of copied text >          PHYSICAL EXAM:  General: Awake and alert, in pain, non-toxic appearance, obese  HEENT: No mucositis or thrush, mucous membrane pink and moist  CV: S1, S2 present, RRR, no JVD  Pulm: Respirations non-labored, CTA b/l to bases  GI: Abdominal soft, non-tender, +BS x 4  : No CVA tenderness, voiding without difficulty  Ext: No peripheral edema, +2 pedal pulses b/l, no peripheral cyanosis  Neuro: AAO x 3, PERRLA, CN II-XII grossly intact, no neurological deficits    A/P:  This is a 58yFemale with PMHx ALL s/p chemo, IT MTX s/p SC collection w/ step 1 stem cell mobilization regime. Admit for headaches +SDH s/p DDAVP. s/p Inatuzumab Day 2.   1) Neutropenic Fevers  - Start Cefepime 2g IV Q3gilxz. D/C Levaquin PO  - Panculture  - IV Tylenol 1g x 1 STAT  - CXR STAT  - Will f/u with Primary team in AM  2) Headaches  - Relieved after Dilaudid  - continue neuro checks  - Repeat VS in 1 hour        KELLY GOSS  Spectra Link # [id number]

## 2018-10-09 NOTE — PROGRESS NOTE ADULT - SUBJECTIVE AND OBJECTIVE BOX
Diagnosis: Relapsed ALL Ph (+)     Protocol/Chemo Regimen: Inotuzamab    Day: 2    Pt endorsed: Headache improved with pain medications, nausea    Review of Systems: Patient denies dizziness, visual changes, chest pain, palpitations, SOB, abdominal pain, vomiting, diarrhea or dysuria.     Pain scale: denies now     Diet: Regular     Allergies: No Known Drug Allergies  shellfish (Nausea; Vomiting)      ANTIMICROBIALS     cefepime   IVPB 2000 milliGRAM(s) IV Intermittent every 8 hours  posaconazole DR Tablet 300 milliGRAM(s) Oral daily      STANDING MEDICATIONS  allopurinol 300 milliGRAM(s) Oral daily  influenza   Vaccine 0.5 milliLiter(s) IntraMuscular once  levETIRAcetam 500 milliGRAM(s) Oral two times a day  multivitamin 1 Tablet(s) Oral daily  pantoprazole  Injectable 40 milliGRAM(s) IV Push daily  sodium chloride 0.9% lock flush 20 milliLiter(s) IV Push once  sodium chloride 0.9%. 1000 milliLiter(s) IV Continuous <Continuous>      PRN MEDICATIONS  acetaminophen   Tablet .. 650 milliGRAM(s) Oral every 6 hours PRN  HYDROmorphone  Injectable 1 milliGRAM(s) IV Push every 4 hours PRN  metoclopramide Injectable 10 milliGRAM(s) IV Push every 6 hours PRN  sodium chloride 0.9% lock flush 10 milliLiter(s) IV Push every 1 hour PRN  sodium chloride 0.9% lock flush 10 milliLiter(s) IV Push every 12 hours PRN      Vital Signs Last 24 Hrs  T(C): 37.8 (09 Oct 2018 05:26), Max: 40.3 (09 Oct 2018 00:28)  T(F): 100.1 (09 Oct 2018 05:26), Max: 104.5 (09 Oct 2018 00:28)  HR: 116 (09 Oct 2018 05:26) (70 - 132)  BP: 119/79 (09 Oct 2018 05:26) (119/79 - 137/77)  BP(mean): --  RR: 18 (09 Oct 2018 05:26) (18 - 18)  SpO2: 96% (09 Oct 2018 05:26) (94% - 96%)      PHYSICAL EXAM  General: NAD  HEENT: PERRLA, EOMI, clear oropharynx, anicteric sclera, pink conjunctiva  Neck: supple  CV: (+) S1/S2 RRR  Lungs: clear to auscultation, no wheezes or rales  Abdomen: soft, non-tender, non-distended (+) BS  Ext: no clubbing, cyanosis or edema  Skin: no rashes and no petechiae  Neuro: alert and oriented X 3, no focal deficits  PIV: C/D/I       LABS:          RADIOLOGY & ADDITIONAL STUDIES:    EXAM:  CT BRAIN                        PROCEDURE DATE:  10/06/2018    Impression: No significant change when allowing for differences in   technique.      EXAM:  CT ABDOMEN AND PELVIS                        PROCEDURE DATE:  10/05/2018    IMPRESSION: No evidence of retroperitoneal bleed.  Cholelithiasis. Diagnosis: Relapsed ALL Ph (+)     Protocol/Chemo Regimen: Inotuzamab    Day: 2    Pt endorsed: Headache improved with pain medications, nausea    Review of Systems: Patient denies dizziness, visual changes, chest pain, palpitations, SOB, abdominal pain, vomiting, diarrhea or dysuria.     Pain scale: denies now     Diet: Regular     Allergies: No Known Drug Allergies  shellfish (Nausea; Vomiting)      ANTIMICROBIALS     cefepime   IVPB 2000 milliGRAM(s) IV Intermittent every 8 hours  posaconazole DR Tablet 300 milliGRAM(s) Oral daily      STANDING MEDICATIONS  allopurinol 300 milliGRAM(s) Oral daily  influenza   Vaccine 0.5 milliLiter(s) IntraMuscular once  levETIRAcetam 500 milliGRAM(s) Oral two times a day  multivitamin 1 Tablet(s) Oral daily  pantoprazole  Injectable 40 milliGRAM(s) IV Push daily  sodium chloride 0.9% lock flush 20 milliLiter(s) IV Push once  sodium chloride 0.9%. 1000 milliLiter(s) IV Continuous <Continuous>      PRN MEDICATIONS  acetaminophen   Tablet .. 650 milliGRAM(s) Oral every 6 hours PRN  HYDROmorphone  Injectable 1 milliGRAM(s) IV Push every 4 hours PRN  metoclopramide Injectable 10 milliGRAM(s) IV Push every 6 hours PRN  sodium chloride 0.9% lock flush 10 milliLiter(s) IV Push every 1 hour PRN  sodium chloride 0.9% lock flush 10 milliLiter(s) IV Push every 12 hours PRN      Vital Signs Last 24 Hrs  T(C): 37.8 (09 Oct 2018 05:26), Max: 40.3 (09 Oct 2018 00:28)  T(F): 100.1 (09 Oct 2018 05:26), Max: 104.5 (09 Oct 2018 00:28)  HR: 116 (09 Oct 2018 05:26) (70 - 132)  BP: 119/79 (09 Oct 2018 05:26) (119/79 - 137/77)  BP(mean): --  RR: 18 (09 Oct 2018 05:26) (18 - 18)  SpO2: 96% (09 Oct 2018 05:26) (94% - 96%)      PHYSICAL EXAM  General: NAD  HEENT: PERRLA, EOMI, clear oropharynx, anicteric sclera, pink conjunctiva  Neck: supple  CV: (+) S1/S2 RRR  Lungs: clear to auscultation, no wheezes or rales  Abdomen: soft, non-tender, non-distended (+) BS  Ext: no clubbing, cyanosis or edema  Skin: no rashes and no petechiae  Neuro: alert and oriented X 3, no focal deficits  PIV: C/D/I       LABS:                  6.8    1.8   )-----------( 23       ( 09 Oct 2018 06:46 )             20.6         Mean Cell Volume : 99.4 fl  Mean Cell Hemoglobin : 32.8 pg  Mean Cell Hemoglobin Concentration : 33.0 gm/dL  Auto Neutrophil # : 0.1 K/uL  Auto Lymphocyte # : See Note  Auto Monocyte # : See Note  Auto Eosinophil # : 0.0 K/uL  Auto Basophil # : 0.0 K/uL  Auto Neutrophil % : 2.0 %  Auto Lymphocyte % : 46.0 %  Auto Monocyte % : x  Auto Eosinophil % : x  Auto Basophil % : x      10-09    140  |  108  |  16  ----------------------------<  173<H>  4.0   |  20<L>  |  1.08    Ca    7.9<L>      09 Oct 2018 06:46  Phos  4.1     10-09  Mg     1.6     10-09    TPro  5.2<L>  /  Alb  3.3  /  TBili  0.6  /  DBili  x   /  AST  52<H>  /  ALT  51<H>  /  AlkPhos  171<H>  10-09      Mg 1.6  Phos 4.1  Mg --  Phos 3.3  Mg 1.8  Phos 2.0      PT/INR - ( 08 Oct 2018 13:09 )   PT: 12.1 sec;   INR: 1.11 ratio         PTT - ( 08 Oct 2018 13:09 )  PTT:28.7 sec      Uric Acid 6.1      RADIOLOGY & ADDITIONAL STUDIES:    EXAM:  CT BRAIN                        PROCEDURE DATE:  10/06/2018    Impression: No significant change when allowing for differences in   technique.      EXAM:  CT ABDOMEN AND PELVIS                        PROCEDURE DATE:  10/05/2018    IMPRESSION: No evidence of retroperitoneal bleed.  Cholelithiasis. Diagnosis: Relapsed ALL Ph (+)     Protocol/Chemo Regimen: Inotuzamab Day 1,5 and 8    Day: 2    Pt endorsed: Headache improved with pain medications    Review of Systems: Patient denies dizziness, visual changes, chest pain, palpitations, SOB, abdominal pain, vomiting, diarrhea or dysuria.     Pain scale: denies now     Diet: Regular     Allergies: No Known Drug Allergies  shellfish (Nausea; Vomiting)      ANTIMICROBIALS     cefepime   IVPB 2000 milliGRAM(s) IV Intermittent every 8 hours  posaconazole DR Tablet 300 milliGRAM(s) Oral daily      STANDING MEDICATIONS  allopurinol 300 milliGRAM(s) Oral daily  influenza   Vaccine 0.5 milliLiter(s) IntraMuscular once  levETIRAcetam 500 milliGRAM(s) Oral two times a day  multivitamin 1 Tablet(s) Oral daily  pantoprazole  Injectable 40 milliGRAM(s) IV Push daily  sodium chloride 0.9% lock flush 20 milliLiter(s) IV Push once  sodium chloride 0.9%. 1000 milliLiter(s) IV Continuous <Continuous>      PRN MEDICATIONS  acetaminophen   Tablet .. 650 milliGRAM(s) Oral every 6 hours PRN  HYDROmorphone  Injectable 1 milliGRAM(s) IV Push every 4 hours PRN  metoclopramide Injectable 10 milliGRAM(s) IV Push every 6 hours PRN  sodium chloride 0.9% lock flush 10 milliLiter(s) IV Push every 1 hour PRN  sodium chloride 0.9% lock flush 10 milliLiter(s) IV Push every 12 hours PRN      Vital Signs Last 24 Hrs  T(C): 37.8 (09 Oct 2018 05:26), Max: 40.3 (09 Oct 2018 00:28)  T(F): 100.1 (09 Oct 2018 05:26), Max: 104.5 (09 Oct 2018 00:28)  HR: 116 (09 Oct 2018 05:26) (70 - 132)  BP: 119/79 (09 Oct 2018 05:26) (119/79 - 137/77)  BP(mean): --  RR: 18 (09 Oct 2018 05:26) (18 - 18)  SpO2: 96% (09 Oct 2018 05:26) (94% - 96%)      PHYSICAL EXAM  General: NAD  HEENT: PERRLA, EOMI, clear oropharynx, anicteric sclera, pink conjunctiva  Neck: supple  CV: (+) S1/S2 RRR  Lungs: clear to auscultation, no wheezes or rales  Abdomen: soft, non-tender, non-distended (+) BS  Ext: no clubbing, cyanosis or edema  Skin: no rashes and no petechiae  Neuro: alert and oriented X 3, no focal deficits  PIV: C/D/I       LABS:                  6.8    1.8   )-----------( 23       ( 09 Oct 2018 06:46 )             20.6         Mean Cell Volume : 99.4 fl  Mean Cell Hemoglobin : 32.8 pg  Mean Cell Hemoglobin Concentration : 33.0 gm/dL  Auto Neutrophil # : 0.1 K/uL  Auto Lymphocyte # : See Note  Auto Monocyte # : See Note  Auto Eosinophil # : 0.0 K/uL  Auto Basophil # : 0.0 K/uL  Auto Neutrophil % : 2.0 %  Auto Lymphocyte % : 46.0 %  Auto Monocyte % : x  Auto Eosinophil % : x  Auto Basophil % : x      10-09    140  |  108  |  16  ----------------------------<  173<H>  4.0   |  20<L>  |  1.08    Ca    7.9<L>      09 Oct 2018 06:46  Phos  4.1     10-09  Mg     1.6     10-09    TPro  5.2<L>  /  Alb  3.3  /  TBili  0.6  /  DBili  x   /  AST  52<H>  /  ALT  51<H>  /  AlkPhos  171<H>  10-09      Mg 1.6  Phos 4.1  Mg --  Phos 3.3  Mg 1.8  Phos 2.0      PT/INR - ( 08 Oct 2018 13:09 )   PT: 12.1 sec;   INR: 1.11 ratio         PTT - ( 08 Oct 2018 13:09 )  PTT:28.7 sec      Uric Acid 6.1      RADIOLOGY & ADDITIONAL STUDIES:    EXAM:  CT BRAIN                        PROCEDURE DATE:  10/06/2018    Impression: No significant change when allowing for differences in   technique.      EXAM:  CT ABDOMEN AND PELVIS                        PROCEDURE DATE:  10/05/2018    IMPRESSION: No evidence of retroperitoneal bleed.  Cholelithiasis. Diagnosis: Relapsed ALL Ph (+)     Protocol/Chemo Regimen: Inotuzamab Day 1,5 and 8    Day: 2    Pt endorsed: Headache improved with pain medications    Review of Systems: Patient denies dizziness, visual changes, chest pain, palpitations, SOB, abdominal pain, vomiting, diarrhea or dysuria.     Pain scale: denies now     Diet: Regular     Allergies: No Known Drug Allergies  shellfish (Nausea; Vomiting)      ANTIMICROBIALS     cefepime   IVPB 2000 milliGRAM(s) IV Intermittent every 8 hours  posaconazole DR Tablet 300 milliGRAM(s) Oral daily      STANDING MEDICATIONS  allopurinol 300 milliGRAM(s) Oral daily  influenza   Vaccine 0.5 milliLiter(s) IntraMuscular once  levETIRAcetam 500 milliGRAM(s) Oral two times a day  multivitamin 1 Tablet(s) Oral daily  pantoprazole  Injectable 40 milliGRAM(s) IV Push daily  sodium chloride 0.9% lock flush 20 milliLiter(s) IV Push once  sodium chloride 0.9%. 1000 milliLiter(s) IV Continuous <Continuous>      PRN MEDICATIONS  acetaminophen   Tablet .. 650 milliGRAM(s) Oral every 6 hours PRN  HYDROmorphone  Injectable 1 milliGRAM(s) IV Push every 4 hours PRN  metoclopramide Injectable 10 milliGRAM(s) IV Push every 6 hours PRN  sodium chloride 0.9% lock flush 10 milliLiter(s) IV Push every 1 hour PRN  sodium chloride 0.9% lock flush 10 milliLiter(s) IV Push every 12 hours PRN      Vital Signs Last 24 Hrs  T(C): 37.8 (09 Oct 2018 05:26), Max: 40.3 (09 Oct 2018 00:28)  T(F): 100.1 (09 Oct 2018 05:26), Max: 104.5 (09 Oct 2018 00:28)  HR: 116 (09 Oct 2018 05:26) (70 - 132)  BP: 119/79 (09 Oct 2018 05:26) (119/79 - 137/77)  BP(mean): --  RR: 18 (09 Oct 2018 05:26) (18 - 18)  SpO2: 96% (09 Oct 2018 05:26) (94% - 96%)      PHYSICAL EXAM  General: NAD  HEENT: PERRLA, EOMI, clear oropharynx, anicteric sclera, pink conjunctiva  Neck: supple  CV: (+) S1/S2 RRR  Lungs: clear to auscultation, no wheezes or rales  Abdomen: soft, non-tender, non-distended (+) BS  Ext: no clubbing, cyanosis or edema  Skin: no rashes and no petechiae  Neuro: alert and oriented X 3, no focal deficits  PIV: C/D/I       LABS:                  6.8    1.8   )-----------( 23       ( 09 Oct 2018 06:46 )             20.6         Mean Cell Volume : 99.4 fl  Mean Cell Hemoglobin : 32.8 pg  Mean Cell Hemoglobin Concentration : 33.0 gm/dL  Auto Neutrophil # : 0.1 K/uL  Auto Lymphocyte # : See Note  Auto Monocyte # : See Note  Auto Eosinophil # : 0.0 K/uL  Auto Basophil # : 0.0 K/uL  Auto Neutrophil % : 2.0 %  Auto Lymphocyte % : 46.0 %  Auto Monocyte % : x  Auto Eosinophil % : x  Auto Basophil % : x      10-09    140  |  108  |  16  ----------------------------<  173<H>  4.0   |  20<L>  |  1.08    Ca    7.9<L>      09 Oct 2018 06:46  Phos  4.1     10-09  Mg     1.6     10-09    TPro  5.2<L>  /  Alb  3.3  /  TBili  0.6  /  DBili  x   /  AST  52<H>  /  ALT  51<H>  /  AlkPhos  171<H>  10-09      Mg 1.6  Phos 4.1  Mg --  Phos 3.3  Mg 1.8  Phos 2.0      PT/INR - ( 08 Oct 2018 13:09 )   PT: 12.1 sec;   INR: 1.11 ratio         PTT - ( 08 Oct 2018 13:09 )  PTT:28.7 sec      Uric Acid 6.1      RADIOLOGY & ADDITIONAL STUDIES:    EXAM:  XR CHEST PORTABLE URGENT 1V                        PROCEDURE DATE:  10/09/2018    Impression: Low lung volumes without consolidations or edema. No pneumothorax  Right PICC line in expected location      EXAM:  CT BRAIN                        PROCEDURE DATE:  10/06/2018    Impression: No significant change when allowing for differences in   technique.      EXAM:  CT ABDOMEN AND PELVIS                        PROCEDURE DATE:  10/05/2018    IMPRESSION: No evidence of retroperitoneal bleed.  Cholelithiasis. Diagnosis: Relapsed ALL Ph (+)     Protocol/Chemo Regimen: Inotuzamab Day 1,8 and 15    Day: 2    Pt endorsed: Headache improved with pain medications    Review of Systems: Patient denies dizziness, visual changes, chest pain, palpitations, SOB, abdominal pain, vomiting, diarrhea or dysuria.     Pain scale: denies now     Diet: Regular     Allergies: No Known Drug Allergies  shellfish (Nausea; Vomiting)      ANTIMICROBIALS     cefepime   IVPB 2000 milliGRAM(s) IV Intermittent every 8 hours  posaconazole DR Tablet 300 milliGRAM(s) Oral daily      STANDING MEDICATIONS  allopurinol 300 milliGRAM(s) Oral daily  influenza   Vaccine 0.5 milliLiter(s) IntraMuscular once  levETIRAcetam 500 milliGRAM(s) Oral two times a day  multivitamin 1 Tablet(s) Oral daily  pantoprazole  Injectable 40 milliGRAM(s) IV Push daily  sodium chloride 0.9% lock flush 20 milliLiter(s) IV Push once  sodium chloride 0.9%. 1000 milliLiter(s) IV Continuous <Continuous>      PRN MEDICATIONS  acetaminophen   Tablet .. 650 milliGRAM(s) Oral every 6 hours PRN  HYDROmorphone  Injectable 1 milliGRAM(s) IV Push every 4 hours PRN  metoclopramide Injectable 10 milliGRAM(s) IV Push every 6 hours PRN  sodium chloride 0.9% lock flush 10 milliLiter(s) IV Push every 1 hour PRN  sodium chloride 0.9% lock flush 10 milliLiter(s) IV Push every 12 hours PRN      Vital Signs Last 24 Hrs  T(C): 37.8 (09 Oct 2018 05:26), Max: 40.3 (09 Oct 2018 00:28)  T(F): 100.1 (09 Oct 2018 05:26), Max: 104.5 (09 Oct 2018 00:28)  HR: 116 (09 Oct 2018 05:26) (70 - 132)  BP: 119/79 (09 Oct 2018 05:26) (119/79 - 137/77)  BP(mean): --  RR: 18 (09 Oct 2018 05:26) (18 - 18)  SpO2: 96% (09 Oct 2018 05:26) (94% - 96%)      PHYSICAL EXAM  General: NAD  HEENT: PERRLA, EOMI, clear oropharynx, anicteric sclera, pink conjunctiva  Neck: supple  CV: (+) S1/S2 RRR  Lungs: clear to auscultation, no wheezes or rales  Abdomen: soft, non-tender, non-distended (+) BS  Ext: no clubbing, cyanosis or edema  Skin: no rashes and no petechiae  Neuro: alert and oriented X 3, no focal deficits  PIV: C/D/I       LABS:                  6.8    1.8   )-----------( 23       ( 09 Oct 2018 06:46 )             20.6         Mean Cell Volume : 99.4 fl  Mean Cell Hemoglobin : 32.8 pg  Mean Cell Hemoglobin Concentration : 33.0 gm/dL  Auto Neutrophil # : 0.1 K/uL  Auto Lymphocyte # : See Note  Auto Monocyte # : See Note  Auto Eosinophil # : 0.0 K/uL  Auto Basophil # : 0.0 K/uL  Auto Neutrophil % : 2.0 %  Auto Lymphocyte % : 46.0 %  Auto Monocyte % : x  Auto Eosinophil % : x  Auto Basophil % : x      10-09    140  |  108  |  16  ----------------------------<  173<H>  4.0   |  20<L>  |  1.08    Ca    7.9<L>      09 Oct 2018 06:46  Phos  4.1     10-09  Mg     1.6     10-09    TPro  5.2<L>  /  Alb  3.3  /  TBili  0.6  /  DBili  x   /  AST  52<H>  /  ALT  51<H>  /  AlkPhos  171<H>  10-09      Mg 1.6  Phos 4.1  Mg --  Phos 3.3  Mg 1.8  Phos 2.0      PT/INR - ( 08 Oct 2018 13:09 )   PT: 12.1 sec;   INR: 1.11 ratio         PTT - ( 08 Oct 2018 13:09 )  PTT:28.7 sec      Uric Acid 6.1      RADIOLOGY & ADDITIONAL STUDIES:    EXAM:  XR CHEST PORTABLE URGENT 1V                        PROCEDURE DATE:  10/09/2018    Impression: Low lung volumes without consolidations or edema. No pneumothorax  Right PICC line in expected location      EXAM:  CT BRAIN                        PROCEDURE DATE:  10/06/2018    Impression: No significant change when allowing for differences in   technique.      EXAM:  CT ABDOMEN AND PELVIS                        PROCEDURE DATE:  10/05/2018    IMPRESSION: No evidence of retroperitoneal bleed.  Cholelithiasis.

## 2018-10-10 LAB
ALBUMIN SERPL ELPH-MCNC: 3.2 G/DL — LOW (ref 3.3–5)
ALBUMIN SERPL ELPH-MCNC: 3.3 G/DL — SIGNIFICANT CHANGE UP (ref 3.3–5)
ALP SERPL-CCNC: 142 U/L — HIGH (ref 40–120)
ALP SERPL-CCNC: 167 U/L — HIGH (ref 40–120)
ALT FLD-CCNC: 43 U/L — SIGNIFICANT CHANGE UP (ref 10–45)
ALT FLD-CCNC: 48 U/L — HIGH (ref 10–45)
ANION GAP SERPL CALC-SCNC: 11 MMOL/L — SIGNIFICANT CHANGE UP (ref 5–17)
ANION GAP SERPL CALC-SCNC: 13 MMOL/L — SIGNIFICANT CHANGE UP (ref 5–17)
AST SERPL-CCNC: 27 U/L — SIGNIFICANT CHANGE UP (ref 10–40)
AST SERPL-CCNC: 32 U/L — SIGNIFICANT CHANGE UP (ref 10–40)
BILIRUB SERPL-MCNC: 0.6 MG/DL — SIGNIFICANT CHANGE UP (ref 0.2–1.2)
BILIRUB SERPL-MCNC: 0.8 MG/DL — SIGNIFICANT CHANGE UP (ref 0.2–1.2)
BUN SERPL-MCNC: 15 MG/DL — SIGNIFICANT CHANGE UP (ref 7–23)
BUN SERPL-MCNC: 18 MG/DL — SIGNIFICANT CHANGE UP (ref 7–23)
CALCIUM SERPL-MCNC: 7.9 MG/DL — LOW (ref 8.4–10.5)
CALCIUM SERPL-MCNC: 8.4 MG/DL — SIGNIFICANT CHANGE UP (ref 8.4–10.5)
CHLORIDE SERPL-SCNC: 107 MMOL/L — SIGNIFICANT CHANGE UP (ref 96–108)
CHLORIDE SERPL-SCNC: 108 MMOL/L — SIGNIFICANT CHANGE UP (ref 96–108)
CO2 SERPL-SCNC: 22 MMOL/L — SIGNIFICANT CHANGE UP (ref 22–31)
CO2 SERPL-SCNC: 23 MMOL/L — SIGNIFICANT CHANGE UP (ref 22–31)
CREAT SERPL-MCNC: 0.84 MG/DL — SIGNIFICANT CHANGE UP (ref 0.5–1.3)
CREAT SERPL-MCNC: 0.99 MG/DL — SIGNIFICANT CHANGE UP (ref 0.5–1.3)
GLUCOSE SERPL-MCNC: 106 MG/DL — HIGH (ref 70–99)
GLUCOSE SERPL-MCNC: 116 MG/DL — HIGH (ref 70–99)
HCT VFR BLD CALC: 19.1 % — CRITICAL LOW (ref 34.5–45)
HGB BLD-MCNC: 6.6 G/DL — CRITICAL LOW (ref 11.5–15.5)
LDH SERPL L TO P-CCNC: 193 U/L — SIGNIFICANT CHANGE UP (ref 50–242)
LDH SERPL L TO P-CCNC: 215 U/L — SIGNIFICANT CHANGE UP (ref 50–242)
MAGNESIUM SERPL-MCNC: 2 MG/DL — SIGNIFICANT CHANGE UP (ref 1.6–2.6)
MAGNESIUM SERPL-MCNC: 2.1 MG/DL — SIGNIFICANT CHANGE UP (ref 1.6–2.6)
MCHC RBC-ENTMCNC: 33.6 PG — SIGNIFICANT CHANGE UP (ref 27–34)
MCHC RBC-ENTMCNC: 34.3 GM/DL — SIGNIFICANT CHANGE UP (ref 32–36)
MCV RBC AUTO: 97.7 FL — SIGNIFICANT CHANGE UP (ref 80–100)
PHOSPHATE SERPL-MCNC: 3.8 MG/DL — SIGNIFICANT CHANGE UP (ref 2.5–4.5)
PHOSPHATE SERPL-MCNC: 4.6 MG/DL — HIGH (ref 2.5–4.5)
PLATELET # BLD AUTO: 23 K/UL — LOW (ref 150–400)
PLATELET # BLD AUTO: 25 K/UL — LOW (ref 150–400)
PLATELET # BLD AUTO: 34 K/UL — LOW (ref 150–400)
POTASSIUM SERPL-MCNC: 3.7 MMOL/L — SIGNIFICANT CHANGE UP (ref 3.5–5.3)
POTASSIUM SERPL-MCNC: 3.8 MMOL/L — SIGNIFICANT CHANGE UP (ref 3.5–5.3)
POTASSIUM SERPL-SCNC: 3.7 MMOL/L — SIGNIFICANT CHANGE UP (ref 3.5–5.3)
POTASSIUM SERPL-SCNC: 3.8 MMOL/L — SIGNIFICANT CHANGE UP (ref 3.5–5.3)
PROT SERPL-MCNC: 5.4 G/DL — LOW (ref 6–8.3)
PROT SERPL-MCNC: 5.4 G/DL — LOW (ref 6–8.3)
RBC # BLD: 1.95 M/UL — LOW (ref 3.8–5.2)
RBC # FLD: 14.7 % — HIGH (ref 10.3–14.5)
SODIUM SERPL-SCNC: 141 MMOL/L — SIGNIFICANT CHANGE UP (ref 135–145)
SODIUM SERPL-SCNC: 143 MMOL/L — SIGNIFICANT CHANGE UP (ref 135–145)
URATE SERPL-MCNC: 5.4 MG/DL — SIGNIFICANT CHANGE UP (ref 2.5–7)
URATE SERPL-MCNC: 6.8 MG/DL — SIGNIFICANT CHANGE UP (ref 2.5–7)
WBC # BLD: 0.5 K/UL — CRITICAL LOW (ref 3.8–10.5)
WBC # FLD AUTO: 0.5 K/UL — CRITICAL LOW (ref 3.8–10.5)

## 2018-10-10 PROCEDURE — 99232 SBSQ HOSP IP/OBS MODERATE 35: CPT

## 2018-10-10 RX ORDER — CALCIUM ACETATE 667 MG
667 TABLET ORAL
Qty: 0 | Refills: 0 | Status: COMPLETED | OUTPATIENT
Start: 2018-10-10 | End: 2018-10-10

## 2018-10-10 RX ORDER — SODIUM CHLORIDE 9 MG/ML
1000 INJECTION INTRAMUSCULAR; INTRAVENOUS; SUBCUTANEOUS
Qty: 0 | Refills: 0 | Status: DISCONTINUED | OUTPATIENT
Start: 2018-10-10 | End: 2018-11-20

## 2018-10-10 RX ADMIN — HYDROMORPHONE HYDROCHLORIDE 1 MILLIGRAM(S): 2 INJECTION INTRAMUSCULAR; INTRAVENOUS; SUBCUTANEOUS at 18:47

## 2018-10-10 RX ADMIN — HYDROMORPHONE HYDROCHLORIDE 1 MILLIGRAM(S): 2 INJECTION INTRAMUSCULAR; INTRAVENOUS; SUBCUTANEOUS at 00:34

## 2018-10-10 RX ADMIN — HYDROMORPHONE HYDROCHLORIDE 1 MILLIGRAM(S): 2 INJECTION INTRAMUSCULAR; INTRAVENOUS; SUBCUTANEOUS at 19:07

## 2018-10-10 RX ADMIN — LEVETIRACETAM 500 MILLIGRAM(S): 250 TABLET, FILM COATED ORAL at 06:02

## 2018-10-10 RX ADMIN — Medication 10 MILLIGRAM(S): at 09:18

## 2018-10-10 RX ADMIN — Medication 667 MILLIGRAM(S): at 09:47

## 2018-10-10 RX ADMIN — Medication 667 MILLIGRAM(S): at 12:03

## 2018-10-10 RX ADMIN — Medication 667 MILLIGRAM(S): at 21:18

## 2018-10-10 RX ADMIN — Medication 667 MILLIGRAM(S): at 17:45

## 2018-10-10 RX ADMIN — SODIUM CHLORIDE 100 MILLILITER(S): 9 INJECTION INTRAMUSCULAR; INTRAVENOUS; SUBCUTANEOUS at 06:02

## 2018-10-10 RX ADMIN — Medication 0.5 MILLIGRAM(S): at 14:05

## 2018-10-10 RX ADMIN — HYDROMORPHONE HYDROCHLORIDE 1 MILLIGRAM(S): 2 INJECTION INTRAMUSCULAR; INTRAVENOUS; SUBCUTANEOUS at 01:04

## 2018-10-10 RX ADMIN — LEVETIRACETAM 500 MILLIGRAM(S): 250 TABLET, FILM COATED ORAL at 17:45

## 2018-10-10 RX ADMIN — CEFEPIME 100 MILLIGRAM(S): 1 INJECTION, POWDER, FOR SOLUTION INTRAMUSCULAR; INTRAVENOUS at 06:02

## 2018-10-10 RX ADMIN — CEFEPIME 100 MILLIGRAM(S): 1 INJECTION, POWDER, FOR SOLUTION INTRAMUSCULAR; INTRAVENOUS at 21:18

## 2018-10-10 RX ADMIN — Medication 300 MILLIGRAM(S): at 12:04

## 2018-10-10 RX ADMIN — SODIUM CHLORIDE 50 MILLILITER(S): 9 INJECTION INTRAMUSCULAR; INTRAVENOUS; SUBCUTANEOUS at 12:04

## 2018-10-10 RX ADMIN — CEFEPIME 100 MILLIGRAM(S): 1 INJECTION, POWDER, FOR SOLUTION INTRAMUSCULAR; INTRAVENOUS at 13:32

## 2018-10-10 RX ADMIN — Medication 1 TABLET(S): at 12:04

## 2018-10-10 RX ADMIN — POSACONAZOLE 300 MILLIGRAM(S): 100 TABLET, DELAYED RELEASE ORAL at 12:04

## 2018-10-10 RX ADMIN — HYDROMORPHONE HYDROCHLORIDE 1 MILLIGRAM(S): 2 INJECTION INTRAMUSCULAR; INTRAVENOUS; SUBCUTANEOUS at 23:26

## 2018-10-10 RX ADMIN — HYDROMORPHONE HYDROCHLORIDE 1 MILLIGRAM(S): 2 INJECTION INTRAMUSCULAR; INTRAVENOUS; SUBCUTANEOUS at 23:41

## 2018-10-10 RX ADMIN — HYDROMORPHONE HYDROCHLORIDE 1 MILLIGRAM(S): 2 INJECTION INTRAMUSCULAR; INTRAVENOUS; SUBCUTANEOUS at 10:07

## 2018-10-10 RX ADMIN — HYDROMORPHONE HYDROCHLORIDE 1 MILLIGRAM(S): 2 INJECTION INTRAMUSCULAR; INTRAVENOUS; SUBCUTANEOUS at 09:47

## 2018-10-10 RX ADMIN — PANTOPRAZOLE SODIUM 40 MILLIGRAM(S): 20 TABLET, DELAYED RELEASE ORAL at 06:02

## 2018-10-10 NOTE — PROGRESS NOTE ADULT - ASSESSMENT
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 The patients hospital course has been complicated by SDH, transaminitis and upper GIB.

## 2018-10-10 NOTE — PROGRESS NOTE ADULT - PROBLEM SELECTOR PLAN 1
10/5 Peripheral flow confirms relapse   Continue Inotuzamab (Day 1, 5 and 8)  Monitor CBC/Lytes and transfuse/replete PRN  Anemia: 1 unit PRBCs x 1  Thrombocytopenia with SDH: 1 bag PLT with repeat level after  Strict Is and Os/Daily weights/Mouth Care  Lasix 40mg IV x 1  Allopurinol 300mg PO daily  Antiemetics  IVF 10/5 Peripheral flow confirms relapse   Continue Inotuzamab (Day 1, 5 and 8)  Monitor CBC/Lytes and transfuse/replete PRN  Anemia: 1 unit PRBCs x 1  Thrombocytopenia with SDH: 1 bag PLT with repeat level after  Hyperphosphatemia: Phos Lo 4 tabs PO today  Strict Is and Os/Daily weights/Mouth Care  Allopurinol 300mg PO daily  Antiemetics  IVF 10/5 Peripheral flow confirms relapse   Continue Inotuzamab (Day 1, 5 and 8)  Monitor CBC/Lytes and transfuse/replete PRN  Anemia: 1 unit PRBCs x 1  Thrombocytopenia with SDH: 1 bag PLT with repeat level after. Per blood bank patient to receive 1/2 bags over 3 hours q 12 hours. HLA to be given once availible  Hyperphosphatemia: Phos Lo 4 tabs PO today  Strict Is and Os/Daily weights/Mouth Care  Allopurinol 300mg PO daily  Antiemetics  IVF 10/5 Peripheral flow confirms relapse   Continue Inotuzamab (Day 1, 8 and 15)  Monitor CBC/Lytes and transfuse/replete PRN  Anemia: 1 unit PRBCs x 1  Thrombocytopenia with SDH: 1 bag PLT with repeat level after. Per blood bank patient to receive 1/2 bags over 3 hours q 12 hours. HLA to be given once availible  Hyperphosphatemia: Phos Lo 4 tabs PO today  Strict Is and Os/Daily weights/Mouth Care  Allopurinol 300mg PO daily  Antiemetics  IVF

## 2018-10-10 NOTE — PROGRESS NOTE ADULT - SUBJECTIVE AND OBJECTIVE BOX
Diagnosis: Relapsed ALL Ph (+)     Protocol/Chemo Regimen: Inotuzamab Day 1,5 and 8    Day: 3    Pt endorsed: Headache improved with pain medications    Review of Systems: Patient denies dizziness, visual changes, chest pain, palpitations, SOB, abdominal pain, vomiting, diarrhea or dysuria.     Pain scale: denies now     Diet: Regular     Allergies: No Known Drug Allergies  shellfish (Nausea; Vomiting)      ANTIMICROBIALS  cefepime   IVPB 2000 milliGRAM(s) IV Intermittent every 8 hours  posaconazole DR Tablet 300 milliGRAM(s) Oral daily      STANDING MEDICATIONS  allopurinol 300 milliGRAM(s) Oral daily  influenza   Vaccine 0.5 milliLiter(s) IntraMuscular once  levETIRAcetam 500 milliGRAM(s) Oral two times a day  multivitamin 1 Tablet(s) Oral daily  pantoprazole    Tablet 40 milliGRAM(s) Oral before breakfast  sodium chloride 0.9% lock flush 20 milliLiter(s) IV Push once  sodium chloride 0.9%. 1000 milliLiter(s) IV Continuous <Continuous>      PRN MEDICATIONS  acetaminophen   Tablet .. 650 milliGRAM(s) Oral every 6 hours PRN  HYDROmorphone  Injectable 1 milliGRAM(s) IV Push every 4 hours PRN  metoclopramide Injectable 10 milliGRAM(s) IV Push every 6 hours PRN  sodium chloride 0.9% lock flush 10 milliLiter(s) IV Push every 1 hour PRN  sodium chloride 0.9% lock flush 10 milliLiter(s) IV Push every 12 hours PRN      Vital Signs Last 24 Hrs  T(C): 36.9 (10 Oct 2018 05:34), Max: 37.8 (09 Oct 2018 17:38)  T(F): 98.5 (10 Oct 2018 05:34), Max: 100.1 (09 Oct 2018 17:38)  HR: 83 (10 Oct 2018 05:34) (83 - 109)  BP: 120/81 (10 Oct 2018 05:34) (108/71 - 137/82)  BP(mean): --  RR: 18 (10 Oct 2018 05:34) (17 - 18)  SpO2: 98% (10 Oct 2018 05:34) (96% - 99%)      PHYSICAL EXAM  General: NAD  HEENT: PERRLA, EOMI, clear oropharynx, anicteric sclera, pink conjunctiva  Neck: supple  CV: (+) S1/S2 RRR  Lungs: clear to auscultation, no wheezes or rales  Abdomen: soft, non-tender, non-distended (+) BS  Ext: no clubbing, cyanosis or edema  Skin: no rashes and no petechiae  Neuro: alert and oriented X 3, no focal deficits  Central Line: C/D/I         LABS:                   RECENT CULTURES:  Culture - Blood (10.09.18 @ 04:59)    Specimen Source: .Blood Blood-Peripheral    Culture Results:   No growth to date.        RADIOLOGY & ADDITIONAL STUDIES:    EXAM:  XR CHEST PORTABLE URGENT 1V                        PROCEDURE DATE:  10/09/2018    Impression: Low lung volumes without consolidations or edema. No pneumothorax  Right PICC line in expected location      EXAM:  CT BRAIN                        PROCEDURE DATE:  10/06/2018    Impression: No significant change when allowing for differences in   technique.      EXAM:  CT ABDOMEN AND PELVIS                        PROCEDURE DATE:  10/05/2018    IMPRESSION: No evidence of retroperitoneal bleed.  Cholelithiasis. Diagnosis: Relapsed ALL Ph (+)     Protocol/Chemo Regimen: Inotuzamab Day 1,5 and 8    Day: 3    Pt endorsed: Headache improved with pain medications    Review of Systems: Patient denies dizziness, visual changes, chest pain, palpitations, SOB, abdominal pain, vomiting, diarrhea or dysuria.     Pain scale: denies now     Diet: Regular     Allergies: No Known Drug Allergies  shellfish (Nausea; Vomiting)      ANTIMICROBIALS  cefepime   IVPB 2000 milliGRAM(s) IV Intermittent every 8 hours  posaconazole DR Tablet 300 milliGRAM(s) Oral daily      STANDING MEDICATIONS  allopurinol 300 milliGRAM(s) Oral daily  influenza   Vaccine 0.5 milliLiter(s) IntraMuscular once  levETIRAcetam 500 milliGRAM(s) Oral two times a day  multivitamin 1 Tablet(s) Oral daily  pantoprazole    Tablet 40 milliGRAM(s) Oral before breakfast  sodium chloride 0.9% lock flush 20 milliLiter(s) IV Push once  sodium chloride 0.9%. 1000 milliLiter(s) IV Continuous <Continuous>      PRN MEDICATIONS  acetaminophen   Tablet .. 650 milliGRAM(s) Oral every 6 hours PRN  HYDROmorphone  Injectable 1 milliGRAM(s) IV Push every 4 hours PRN  metoclopramide Injectable 10 milliGRAM(s) IV Push every 6 hours PRN  sodium chloride 0.9% lock flush 10 milliLiter(s) IV Push every 1 hour PRN  sodium chloride 0.9% lock flush 10 milliLiter(s) IV Push every 12 hours PRN      Vital Signs Last 24 Hrs  T(C): 36.9 (10 Oct 2018 05:34), Max: 37.8 (09 Oct 2018 17:38)  T(F): 98.5 (10 Oct 2018 05:34), Max: 100.1 (09 Oct 2018 17:38)  HR: 83 (10 Oct 2018 05:34) (83 - 109)  BP: 120/81 (10 Oct 2018 05:34) (108/71 - 137/82)  BP(mean): --  RR: 18 (10 Oct 2018 05:34) (17 - 18)  SpO2: 98% (10 Oct 2018 05:34) (96% - 99%)      PHYSICAL EXAM  General: NAD  HEENT: PERRLA, EOMI, clear oropharynx, anicteric sclera, pink conjunctiva  Neck: supple  CV: (+) S1/S2 RRR  Lungs: clear to auscultation, no wheezes or rales  Abdomen: soft, non-tender, non-distended (+) BS  Ext: no clubbing, cyanosis or edema  Skin: no rashes and no petechiae  Neuro: alert and oriented X 3, no focal deficits  Central Line: C/D/I         LABS:                                 x      x     )-----------( 34       ( 10 Oct 2018 11:03 )             x            Mean Cell Volume : 97.7 fl  Mean Cell Hemoglobin : 33.6 pg  Mean Cell Hemoglobin Concentration : 34.3 gm/dL  Auto Neutrophil # : x  Auto Lymphocyte # : x  Auto Monocyte # : x  Auto Eosinophil # : x  Auto Basophil # : x  Auto Neutrophil % : x  Auto Lymphocyte % : x  Auto Monocyte % : x  Auto Eosinophil % : x  Auto Basophil % : x      10-10    143  |  108  |  18  ----------------------------<  106<H>  3.8   |  22  |  0.99    Ca    7.9<L>      10 Oct 2018 07:21  Phos  4.6     10-10  Mg     2.1     10-10    TPro  5.4<L>  /  Alb  3.3  /  TBili  0.6  /  DBili  x   /  AST  32  /  ALT  48<H>  /  AlkPhos  142<H>  10-10      Mg 2.1  Phos 4.6      PT/INR - ( 08 Oct 2018 13:09 )   PT: 12.1 sec;   INR: 1.11 ratio         PTT - ( 08 Oct 2018 13:09 )  PTT:28.7 sec      Uric Acid 6.8        RECENT CULTURES:  Culture - Blood (10.09.18 @ 04:59)    Specimen Source: .Blood Blood-Peripheral    Culture Results:   No growth to date.        RADIOLOGY & ADDITIONAL STUDIES:    EXAM:  XR CHEST PORTABLE URGENT 1V                        PROCEDURE DATE:  10/09/2018    Impression: Low lung volumes without consolidations or edema. No pneumothorax  Right PICC line in expected location      EXAM:  CT BRAIN                        PROCEDURE DATE:  10/06/2018    Impression: No significant change when allowing for differences in   technique.      EXAM:  CT ABDOMEN AND PELVIS                        PROCEDURE DATE:  10/05/2018    IMPRESSION: No evidence of retroperitoneal bleed.  Cholelithiasis. Diagnosis: Relapsed ALL Ph (+)     Protocol/Chemo Regimen: Inotuzamab Day 1,8 and 15    Day: 3    Pt endorsed: Headache improved with pain medications    Review of Systems: Patient denies dizziness, visual changes, chest pain, palpitations, SOB, abdominal pain, vomiting, diarrhea or dysuria.     Pain scale: denies now     Diet: Regular     Allergies: No Known Drug Allergies  shellfish (Nausea; Vomiting)      ANTIMICROBIALS  cefepime   IVPB 2000 milliGRAM(s) IV Intermittent every 8 hours  posaconazole DR Tablet 300 milliGRAM(s) Oral daily      STANDING MEDICATIONS  allopurinol 300 milliGRAM(s) Oral daily  influenza   Vaccine 0.5 milliLiter(s) IntraMuscular once  levETIRAcetam 500 milliGRAM(s) Oral two times a day  multivitamin 1 Tablet(s) Oral daily  pantoprazole    Tablet 40 milliGRAM(s) Oral before breakfast  sodium chloride 0.9% lock flush 20 milliLiter(s) IV Push once  sodium chloride 0.9%. 1000 milliLiter(s) IV Continuous <Continuous>      PRN MEDICATIONS  acetaminophen   Tablet .. 650 milliGRAM(s) Oral every 6 hours PRN  HYDROmorphone  Injectable 1 milliGRAM(s) IV Push every 4 hours PRN  metoclopramide Injectable 10 milliGRAM(s) IV Push every 6 hours PRN  sodium chloride 0.9% lock flush 10 milliLiter(s) IV Push every 1 hour PRN  sodium chloride 0.9% lock flush 10 milliLiter(s) IV Push every 12 hours PRN      Vital Signs Last 24 Hrs  T(C): 36.9 (10 Oct 2018 05:34), Max: 37.8 (09 Oct 2018 17:38)  T(F): 98.5 (10 Oct 2018 05:34), Max: 100.1 (09 Oct 2018 17:38)  HR: 83 (10 Oct 2018 05:34) (83 - 109)  BP: 120/81 (10 Oct 2018 05:34) (108/71 - 137/82)  BP(mean): --  RR: 18 (10 Oct 2018 05:34) (17 - 18)  SpO2: 98% (10 Oct 2018 05:34) (96% - 99%)      PHYSICAL EXAM  General: NAD  HEENT: PERRLA, EOMI, clear oropharynx, anicteric sclera, pink conjunctiva  Neck: supple  CV: (+) S1/S2 RRR  Lungs: clear to auscultation, no wheezes or rales  Abdomen: soft, non-tender, non-distended (+) BS  Ext: no clubbing, cyanosis or edema  Skin: no rashes and no petechiae  Neuro: alert and oriented X 3, no focal deficits  Central Line: C/D/I         LABS:                                 x      x     )-----------( 34       ( 10 Oct 2018 11:03 )             x            Mean Cell Volume : 97.7 fl  Mean Cell Hemoglobin : 33.6 pg  Mean Cell Hemoglobin Concentration : 34.3 gm/dL  Auto Neutrophil # : x  Auto Lymphocyte # : x  Auto Monocyte # : x  Auto Eosinophil # : x  Auto Basophil # : x  Auto Neutrophil % : x  Auto Lymphocyte % : x  Auto Monocyte % : x  Auto Eosinophil % : x  Auto Basophil % : x      10-10    143  |  108  |  18  ----------------------------<  106<H>  3.8   |  22  |  0.99    Ca    7.9<L>      10 Oct 2018 07:21  Phos  4.6     10-10  Mg     2.1     10-10    TPro  5.4<L>  /  Alb  3.3  /  TBili  0.6  /  DBili  x   /  AST  32  /  ALT  48<H>  /  AlkPhos  142<H>  10-10      Mg 2.1  Phos 4.6      PT/INR - ( 08 Oct 2018 13:09 )   PT: 12.1 sec;   INR: 1.11 ratio         PTT - ( 08 Oct 2018 13:09 )  PTT:28.7 sec      Uric Acid 6.8        RECENT CULTURES:  Culture - Blood (10.09.18 @ 04:59)    Specimen Source: .Blood Blood-Peripheral    Culture Results:   No growth to date.        RADIOLOGY & ADDITIONAL STUDIES:    EXAM:  XR CHEST PORTABLE URGENT 1V                        PROCEDURE DATE:  10/09/2018    Impression: Low lung volumes without consolidations or edema. No pneumothorax  Right PICC line in expected location      EXAM:  CT BRAIN                        PROCEDURE DATE:  10/06/2018    Impression: No significant change when allowing for differences in   technique.      EXAM:  CT ABDOMEN AND PELVIS                        PROCEDURE DATE:  10/05/2018    IMPRESSION: No evidence of retroperitoneal bleed.  Cholelithiasis.

## 2018-10-10 NOTE — PROGRESS NOTE ADULT - ATTENDING COMMENTS
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 FISH and PCR negative. s/p Masha-Auto on 12/16/16. Pt was on sprycel maintenance. 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     - Inotuzumab- day 2.    - cont to keep plt ct >80K, keep hb >7  - cont keppra px.  Repeat head CT Thursday/Friday  - neutropenic, febrile, likely due to the inotuzumab.  Follow up cultures, continue cefepime/posaconazole.  -GI bleed has resolved, continue IV protonix. 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 FISH and PCR negative. s/p Masha-Auto on 12/16/16. Pt was on sprycel maintenance. 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     - Inotuzumab- day 3.    - cont to keep plt ct >80K, keep hb >7  -Neutropenic, afebrile.  Blood cultures 10/9 negative.  Continue cefepime/posaconazole.   - cont keppra px.  Repeat head CT Thursday/Friday  -GI bleed has resolved, continue IV protonix- change to po tomorrow.

## 2018-10-10 NOTE — PROGRESS NOTE ADULT - PROBLEM SELECTOR PLAN 4
Mild   Likely 2/2 chemotherapy and/or disease  Monitor daily CMP  May need to change Posaconazole to Mycamine, will monitor at this time  Avoid hepatotoxic medications Mild, improving  Likely 2/2 chemotherapy and/or disease  Monitor daily CMP  May need to change Posaconazole to Mycamine, will monitor at this time  Avoid hepatotoxic medications

## 2018-10-10 NOTE — PROGRESS NOTE ADULT - PROBLEM SELECTOR PLAN 3
10/4 CT Head revealed small bilateral acute on chronic convexity subdural hematomas. Repeat CT Head on 10/5 and 10/6 is stable   Will repeat CT Head on 10/11  10/4 Given DDAVP   Continue Keppra 500mg BID for seizure ppx   Keep PLT >80K  Neuro checks q4 hrs  Pain management   Neurosurgery following, no intervention at this time- Do not tap Omaya

## 2018-10-11 ENCOUNTER — APPOINTMENT (OUTPATIENT)
Dept: HEMATOLOGY ONCOLOGY | Facility: CLINIC | Age: 58
End: 2018-10-11

## 2018-10-11 LAB
ALBUMIN SERPL ELPH-MCNC: 3.3 G/DL — SIGNIFICANT CHANGE UP (ref 3.3–5)
ALBUMIN SERPL ELPH-MCNC: 3.3 G/DL — SIGNIFICANT CHANGE UP (ref 3.3–5)
ALP SERPL-CCNC: 175 U/L — HIGH (ref 40–120)
ALP SERPL-CCNC: 200 U/L — HIGH (ref 40–120)
ALT FLD-CCNC: 42 U/L — SIGNIFICANT CHANGE UP (ref 10–45)
ALT FLD-CCNC: 44 U/L — SIGNIFICANT CHANGE UP (ref 10–45)
ANION GAP SERPL CALC-SCNC: 10 MMOL/L — SIGNIFICANT CHANGE UP (ref 5–17)
ANION GAP SERPL CALC-SCNC: 11 MMOL/L — SIGNIFICANT CHANGE UP (ref 5–17)
APTT BLD: 28.1 SEC — SIGNIFICANT CHANGE UP (ref 27.5–37.4)
AST SERPL-CCNC: 21 U/L — SIGNIFICANT CHANGE UP (ref 10–40)
AST SERPL-CCNC: 28 U/L — SIGNIFICANT CHANGE UP (ref 10–40)
BASOPHILS # BLD AUTO: 0 K/UL — SIGNIFICANT CHANGE UP (ref 0–0.2)
BASOPHILS NFR BLD AUTO: 0.8 % — SIGNIFICANT CHANGE UP (ref 0–2)
BILIRUB SERPL-MCNC: 0.7 MG/DL — SIGNIFICANT CHANGE UP (ref 0.2–1.2)
BILIRUB SERPL-MCNC: 0.7 MG/DL — SIGNIFICANT CHANGE UP (ref 0.2–1.2)
BUN SERPL-MCNC: 11 MG/DL — SIGNIFICANT CHANGE UP (ref 7–23)
BUN SERPL-MCNC: 12 MG/DL — SIGNIFICANT CHANGE UP (ref 7–23)
CALCIUM SERPL-MCNC: 8.7 MG/DL — SIGNIFICANT CHANGE UP (ref 8.4–10.5)
CALCIUM SERPL-MCNC: 8.8 MG/DL — SIGNIFICANT CHANGE UP (ref 8.4–10.5)
CHLORIDE SERPL-SCNC: 107 MMOL/L — SIGNIFICANT CHANGE UP (ref 96–108)
CHLORIDE SERPL-SCNC: 107 MMOL/L — SIGNIFICANT CHANGE UP (ref 96–108)
CHROM ANALY INTERPHASE BLD FISH-IMP: SIGNIFICANT CHANGE UP
CO2 SERPL-SCNC: 22 MMOL/L — SIGNIFICANT CHANGE UP (ref 22–31)
CO2 SERPL-SCNC: 23 MMOL/L — SIGNIFICANT CHANGE UP (ref 22–31)
CREAT SERPL-MCNC: 0.74 MG/DL — SIGNIFICANT CHANGE UP (ref 0.5–1.3)
CREAT SERPL-MCNC: 0.84 MG/DL — SIGNIFICANT CHANGE UP (ref 0.5–1.3)
EOSINOPHIL # BLD AUTO: 0 K/UL — SIGNIFICANT CHANGE UP (ref 0–0.5)
EOSINOPHIL NFR BLD AUTO: 0.8 % — SIGNIFICANT CHANGE UP (ref 0–6)
FIBRINOGEN PPP-MCNC: 442 MG/DL — SIGNIFICANT CHANGE UP (ref 310–510)
GLUCOSE SERPL-MCNC: 101 MG/DL — HIGH (ref 70–99)
GLUCOSE SERPL-MCNC: 125 MG/DL — HIGH (ref 70–99)
HCT VFR BLD CALC: 22.5 % — LOW (ref 34.5–45)
HGB BLD-MCNC: 7.8 G/DL — LOW (ref 11.5–15.5)
INR BLD: 0.96 RATIO — SIGNIFICANT CHANGE UP (ref 0.88–1.16)
LDH SERPL L TO P-CCNC: 183 U/L — SIGNIFICANT CHANGE UP (ref 50–242)
LDH SERPL L TO P-CCNC: 198 U/L — SIGNIFICANT CHANGE UP (ref 50–242)
LYMPHOCYTES # BLD AUTO: 0.8 K/UL — LOW (ref 1–3.3)
LYMPHOCYTES # BLD AUTO: 94.3 % — HIGH (ref 13–44)
MAGNESIUM SERPL-MCNC: 2 MG/DL — SIGNIFICANT CHANGE UP (ref 1.6–2.6)
MAGNESIUM SERPL-MCNC: 2.2 MG/DL — SIGNIFICANT CHANGE UP (ref 1.6–2.6)
MCHC RBC-ENTMCNC: 33.6 PG — SIGNIFICANT CHANGE UP (ref 27–34)
MCHC RBC-ENTMCNC: 34.7 GM/DL — SIGNIFICANT CHANGE UP (ref 32–36)
MCV RBC AUTO: 96.9 FL — SIGNIFICANT CHANGE UP (ref 80–100)
MONOCYTES # BLD AUTO: 0 K/UL — SIGNIFICANT CHANGE UP (ref 0–0.9)
MONOCYTES NFR BLD AUTO: 2.1 % — SIGNIFICANT CHANGE UP (ref 2–14)
NEUTROPHILS # BLD AUTO: 0 K/UL — LOW (ref 1.8–7.4)
NEUTROPHILS NFR BLD AUTO: 2 % — LOW (ref 43–77)
PHOSPHATE SERPL-MCNC: 3.2 MG/DL — SIGNIFICANT CHANGE UP (ref 2.5–4.5)
PHOSPHATE SERPL-MCNC: 4.1 MG/DL — SIGNIFICANT CHANGE UP (ref 2.5–4.5)
PLATELET # BLD AUTO: 34 K/UL — LOW (ref 150–400)
POTASSIUM SERPL-MCNC: 3.8 MMOL/L — SIGNIFICANT CHANGE UP (ref 3.5–5.3)
POTASSIUM SERPL-MCNC: 4.2 MMOL/L — SIGNIFICANT CHANGE UP (ref 3.5–5.3)
POTASSIUM SERPL-SCNC: 3.8 MMOL/L — SIGNIFICANT CHANGE UP (ref 3.5–5.3)
POTASSIUM SERPL-SCNC: 4.2 MMOL/L — SIGNIFICANT CHANGE UP (ref 3.5–5.3)
PROT SERPL-MCNC: 5.6 G/DL — LOW (ref 6–8.3)
PROT SERPL-MCNC: 6 G/DL — SIGNIFICANT CHANGE UP (ref 6–8.3)
PROTHROM AB SERPL-ACNC: 10.4 SEC — SIGNIFICANT CHANGE UP (ref 9.8–12.7)
RBC # BLD: 2.33 M/UL — LOW (ref 3.8–5.2)
RBC # FLD: 14.7 % — HIGH (ref 10.3–14.5)
SODIUM SERPL-SCNC: 139 MMOL/L — SIGNIFICANT CHANGE UP (ref 135–145)
SODIUM SERPL-SCNC: 141 MMOL/L — SIGNIFICANT CHANGE UP (ref 135–145)
URATE SERPL-MCNC: 3.7 MG/DL — SIGNIFICANT CHANGE UP (ref 2.5–7)
URATE SERPL-MCNC: 4.6 MG/DL — SIGNIFICANT CHANGE UP (ref 2.5–7)
WBC # BLD: 0.9 K/UL — CRITICAL LOW (ref 3.8–10.5)
WBC # FLD AUTO: 0.9 K/UL — CRITICAL LOW (ref 3.8–10.5)

## 2018-10-11 PROCEDURE — 70450 CT HEAD/BRAIN W/O DYE: CPT | Mod: 26

## 2018-10-11 PROCEDURE — 99232 SBSQ HOSP IP/OBS MODERATE 35: CPT

## 2018-10-11 RX ADMIN — HYDROMORPHONE HYDROCHLORIDE 1 MILLIGRAM(S): 2 INJECTION INTRAMUSCULAR; INTRAVENOUS; SUBCUTANEOUS at 16:34

## 2018-10-11 RX ADMIN — HYDROMORPHONE HYDROCHLORIDE 1 MILLIGRAM(S): 2 INJECTION INTRAMUSCULAR; INTRAVENOUS; SUBCUTANEOUS at 21:30

## 2018-10-11 RX ADMIN — Medication 300 MILLIGRAM(S): at 12:09

## 2018-10-11 RX ADMIN — SODIUM CHLORIDE 50 MILLILITER(S): 9 INJECTION INTRAMUSCULAR; INTRAVENOUS; SUBCUTANEOUS at 21:34

## 2018-10-11 RX ADMIN — PANTOPRAZOLE SODIUM 40 MILLIGRAM(S): 20 TABLET, DELAYED RELEASE ORAL at 05:23

## 2018-10-11 RX ADMIN — LEVETIRACETAM 500 MILLIGRAM(S): 250 TABLET, FILM COATED ORAL at 05:22

## 2018-10-11 RX ADMIN — LEVETIRACETAM 500 MILLIGRAM(S): 250 TABLET, FILM COATED ORAL at 18:12

## 2018-10-11 RX ADMIN — SODIUM CHLORIDE 50 MILLILITER(S): 9 INJECTION INTRAMUSCULAR; INTRAVENOUS; SUBCUTANEOUS at 12:09

## 2018-10-11 RX ADMIN — HYDROMORPHONE HYDROCHLORIDE 1 MILLIGRAM(S): 2 INJECTION INTRAMUSCULAR; INTRAVENOUS; SUBCUTANEOUS at 05:42

## 2018-10-11 RX ADMIN — Medication 1 TABLET(S): at 12:09

## 2018-10-11 RX ADMIN — HYDROMORPHONE HYDROCHLORIDE 1 MILLIGRAM(S): 2 INJECTION INTRAMUSCULAR; INTRAVENOUS; SUBCUTANEOUS at 21:50

## 2018-10-11 RX ADMIN — HYDROMORPHONE HYDROCHLORIDE 1 MILLIGRAM(S): 2 INJECTION INTRAMUSCULAR; INTRAVENOUS; SUBCUTANEOUS at 12:10

## 2018-10-11 RX ADMIN — CEFEPIME 100 MILLIGRAM(S): 1 INJECTION, POWDER, FOR SOLUTION INTRAMUSCULAR; INTRAVENOUS at 05:22

## 2018-10-11 RX ADMIN — POSACONAZOLE 300 MILLIGRAM(S): 100 TABLET, DELAYED RELEASE ORAL at 12:09

## 2018-10-11 RX ADMIN — HYDROMORPHONE HYDROCHLORIDE 1 MILLIGRAM(S): 2 INJECTION INTRAMUSCULAR; INTRAVENOUS; SUBCUTANEOUS at 10:17

## 2018-10-11 RX ADMIN — CEFEPIME 100 MILLIGRAM(S): 1 INJECTION, POWDER, FOR SOLUTION INTRAMUSCULAR; INTRAVENOUS at 14:35

## 2018-10-11 RX ADMIN — SODIUM CHLORIDE 50 MILLILITER(S): 9 INJECTION INTRAMUSCULAR; INTRAVENOUS; SUBCUTANEOUS at 05:22

## 2018-10-11 RX ADMIN — HYDROMORPHONE HYDROCHLORIDE 1 MILLIGRAM(S): 2 INJECTION INTRAMUSCULAR; INTRAVENOUS; SUBCUTANEOUS at 16:55

## 2018-10-11 RX ADMIN — CEFEPIME 100 MILLIGRAM(S): 1 INJECTION, POWDER, FOR SOLUTION INTRAMUSCULAR; INTRAVENOUS at 21:33

## 2018-10-11 RX ADMIN — HYDROMORPHONE HYDROCHLORIDE 1 MILLIGRAM(S): 2 INJECTION INTRAMUSCULAR; INTRAVENOUS; SUBCUTANEOUS at 06:00

## 2018-10-11 NOTE — PROGRESS NOTE ADULT - PROBLEM SELECTOR PLAN 4
Mild, improving  Likely 2/2 chemotherapy and/or disease  Monitor daily CMP  May need to change Posaconazole to Mycamine, will monitor at this time  Avoid hepatotoxic medications

## 2018-10-11 NOTE — PROGRESS NOTE ADULT - SUBJECTIVE AND OBJECTIVE BOX
Diagnosis:    Protocol/Chemo Regimen:    Day:     Pt endorsed:    Review of Systems:     Pain scale:     Diet:     Allergies    No Known Drug Allergies  shellfish (Nausea; Vomiting)    Intolerances        ANTIMICROBIALS  cefepime   IVPB      cefepime   IVPB 2000 milliGRAM(s) IV Intermittent every 8 hours  posaconazole DR Tablet 300 milliGRAM(s) Oral daily      HEME/ONC MEDICATIONS      STANDING MEDICATIONS  allopurinol 300 milliGRAM(s) Oral daily  influenza   Vaccine 0.5 milliLiter(s) IntraMuscular once  levETIRAcetam 500 milliGRAM(s) Oral two times a day  multivitamin 1 Tablet(s) Oral daily  pantoprazole    Tablet 40 milliGRAM(s) Oral before breakfast  sodium chloride 0.9% lock flush 20 milliLiter(s) IV Push once  sodium chloride 0.9%. 1000 milliLiter(s) IV Continuous <Continuous>      PRN MEDICATIONS  acetaminophen   Tablet .. 650 milliGRAM(s) Oral every 6 hours PRN  HYDROmorphone  Injectable 1 milliGRAM(s) IV Push every 4 hours PRN  LORazepam   Injectable 0.5 milliGRAM(s) IV Push every 6 hours PRN  metoclopramide Injectable 10 milliGRAM(s) IV Push every 6 hours PRN  sodium chloride 0.9% lock flush 10 milliLiter(s) IV Push every 1 hour PRN  sodium chloride 0.9% lock flush 10 milliLiter(s) IV Push every 12 hours PRN        Vital Signs Last 24 Hrs  T(C): 36.8 (11 Oct 2018 05:54), Max: 37.5 (10 Oct 2018 20:05)  T(F): 98.3 (11 Oct 2018 05:54), Max: 99.5 (10 Oct 2018 20:05)  HR: 85 (11 Oct 2018 05:54) (85 - 100)  BP: 143/80 (11 Oct 2018 05:54) (112/77 - 143/80)  BP(mean): --  RR: 16 (11 Oct 2018 05:54) (16 - 18)  SpO2: 98% (11 Oct 2018 05:54) (95% - 100%)    PHYSICAL EXAM  General: NAD  HEENT: PERRLA, EOMOI, clear oropharynx, anicteric sclera, pink conjunctiva  Neck: supple  CV: (+) S1/S2 RRR  Lungs: clear to auscultation, no wheezes or rales  Abdomen: soft, non-tender, non-distended (+) BS  Ext: no clubbing, cyanosis or edema  Skin: no rashes and no petechiae  Neuro: alert and oriented X 3, no focal deficits  Central Line:     RECENT CULTURES:  10-09 @ 04:59  .Blood Blood-Catheter  --  --  --    No growth to date.  --  10-04 @ 22:09  .Blood Blood  --  --  --    No growth at 5 days.  --        LABS:                        7.8    0.9   )-----------( 34       ( 11 Oct 2018 06:40 )             22.5         Mean Cell Volume : 96.9 fl  Mean Cell Hemoglobin : 33.6 pg  Mean Cell Hemoglobin Concentration : 34.7 gm/dL  Auto Neutrophil # : 0.0 K/uL  Auto Lymphocyte # : 0.8 K/uL  Auto Monocyte # : 0.0 K/uL  Auto Eosinophil # : 0.0 K/uL  Auto Basophil # : 0.0 K/uL  Auto Neutrophil % : 2.0 %  Auto Lymphocyte % : 94.3 %  Auto Monocyte % : 2.1 %  Auto Eosinophil % : 0.8 %  Auto Basophil % : 0.8 %      10-11    141  |  107  |  12  ----------------------------<  101<H>  4.2   |  23  |  0.84    Ca    8.8      11 Oct 2018 06:40  Phos  4.1     10-11  Mg     2.2     10-11    TPro  6.0  /  Alb  3.3  /  TBili  0.7  /  DBili  x   /  AST  21  /  ALT  42  /  AlkPhos  175<H>  10-11      Mg 2.2  Phos 4.1      PT/INR - ( 11 Oct 2018 06:40 )   PT: 10.4 sec;   INR: 0.96 ratio         PTT - ( 11 Oct 2018 06:40 )  PTT:28.1 sec      Uric Acid 4.6      Uric Acid 5.4        RADIOLOGY & ADDITIONAL STUDIES: Diagnosis: Relapsed ALL Ph (+)     Protocol/Chemo Regimen: Inotuzamab Day 1,8 and 15    Day: 4    Pt endorsed: Headache stable with pain medications    Review of Systems: Patient denies dizziness, visual changes, chest pain, palpitations, SOB, abdominal pain, vomiting, diarrhea or dysuria.     Pain scale: denies now     Diet: Regular     Allergies: No Known Drug Allergies  shellfish (Nausea; Vomiting)      ANTIMICROBIALS   cefepime   IVPB 2000 milliGRAM(s) IV Intermittent every 8 hours  posaconazole DR Tablet 300 milliGRAM(s) Oral daily      STANDING MEDICATIONS  allopurinol 300 milliGRAM(s) Oral daily  influenza   Vaccine 0.5 milliLiter(s) IntraMuscular once  levETIRAcetam 500 milliGRAM(s) Oral two times a day  multivitamin 1 Tablet(s) Oral daily  pantoprazole    Tablet 40 milliGRAM(s) Oral before breakfast  sodium chloride 0.9% lock flush 20 milliLiter(s) IV Push once  sodium chloride 0.9%. 1000 milliLiter(s) IV Continuous <Continuous>      PRN MEDICATIONS  acetaminophen   Tablet .. 650 milliGRAM(s) Oral every 6 hours PRN  HYDROmorphone  Injectable 1 milliGRAM(s) IV Push every 4 hours PRN  LORazepam   Injectable 0.5 milliGRAM(s) IV Push every 6 hours PRN  metoclopramide Injectable 10 milliGRAM(s) IV Push every 6 hours PRN  sodium chloride 0.9% lock flush 10 milliLiter(s) IV Push every 1 hour PRN  sodium chloride 0.9% lock flush 10 milliLiter(s) IV Push every 12 hours PRN      Vital Signs Last 24 Hrs  T(C): 36.8 (11 Oct 2018 05:54), Max: 37.5 (10 Oct 2018 20:05)  T(F): 98.3 (11 Oct 2018 05:54), Max: 99.5 (10 Oct 2018 20:05)  HR: 85 (11 Oct 2018 05:54) (85 - 100)  BP: 143/80 (11 Oct 2018 05:54) (112/77 - 143/80)  BP(mean): --  RR: 16 (11 Oct 2018 05:54) (16 - 18)  SpO2: 98% (11 Oct 2018 05:54) (95% - 100%)      PHYSICAL EXAM  General: NAD  HEENT: PERRLA, EOMI, clear oropharynx, anicteric sclera, pink conjunctiva  Neck: supple  CV: (+) S1/S2 RRR  Lungs: clear to auscultation, no wheezes or rales  Abdomen: soft, non-tender, non-distended (+) BS  Ext: no clubbing, cyanosis or edema  Skin: no rashes and no petechiae  Neuro: alert and oriented X 3, no focal deficits  Central Line: C/D/I         LABS:                        7.8    0.9   )-----------( 34       ( 11 Oct 2018 06:40 )             22.5         Mean Cell Volume : 96.9 fl  Mean Cell Hemoglobin : 33.6 pg  Mean Cell Hemoglobin Concentration : 34.7 gm/dL  Auto Neutrophil # : 0.0 K/uL  Auto Lymphocyte # : 0.8 K/uL  Auto Monocyte # : 0.0 K/uL  Auto Eosinophil # : 0.0 K/uL  Auto Basophil # : 0.0 K/uL  Auto Neutrophil % : 2.0 %  Auto Lymphocyte % : 94.3 %  Auto Monocyte % : 2.1 %  Auto Eosinophil % : 0.8 %  Auto Basophil % : 0.8 %      10-11    141  |  107  |  12  ----------------------------<  101<H>  4.2   |  23  |  0.84    Ca    8.8      11 Oct 2018 06:40  Phos  4.1     10-11  Mg     2.2     10-11    TPro  6.0  /  Alb  3.3  /  TBili  0.7  /  DBili  x   /  AST  21  /  ALT  42  /  AlkPhos  175<H>  10-11      Mg 2.2  Phos 4.1      PT/INR - ( 11 Oct 2018 06:40 )   PT: 10.4 sec;   INR: 0.96 ratio         PTT - ( 11 Oct 2018 06:40 )  PTT:28.1 sec      Uric Acid 4.6        RADIOLOGY & ADDITIONAL STUDIES:      EXAM:  CT BRAIN                        PROCEDURE DATE:  10/11/2018    IMPRESSION: Small residual hypodense left frontoparietal subdural collection, without   significant change in size.   Unchanged posterior left parafalcine and left tentorial subdural hematoma.  No new areas of acute intracranial hemorrhage.      EXAM:  XR CHEST PORTABLE URGENT 1V                        PROCEDURE DATE:  10/09/2018    Impression: Low lung volumes without consolidations or edema. No pneumothorax  Right PICC line in expected location      EXAM:  CT BRAIN                        PROCEDURE DATE:  10/06/2018    Impression: No significant change when allowing for differences in   technique.      EXAM:  CT ABDOMEN AND PELVIS                        PROCEDURE DATE:  10/05/2018    IMPRESSION: No evidence of retroperitoneal bleed.  Cholelithiasis.

## 2018-10-11 NOTE — DIETITIAN INITIAL EVALUATION ADULT. - NS AS NUTRI INTERV MEALS SNACK
1. Continue regular diet with ensure enlive 2 x daily (350 Kcal, 20g protein per 8 oz serving), 2. Continue to encourage po intake and obtain/honor food preferences as able-discussed consuming smaller, more frequent meals with emphasis on protein foods first, will provide cheese and crackers, 3. Monitor PO intake, diet tolerance, weight trends, labs, and skin integrity, 4. RD to remain available as needed

## 2018-10-11 NOTE — DIETITIAN INITIAL EVALUATION ADULT. - OTHER INFO
Pt with ~20 lb weight gain since stem cell transplant admission weight of 230 lbs, pt stated lately her weight has been ~258 lbs with a slight decline to current admission weight 251.1 lbs (10/5). In house weight has uptrended to current weight 261.9 lbs (10/11), likely related to fluids, will continue to monitor. Pt with food allergy to shellfish-will become nauseous and vomit on consumption as well as experience tingling. Pt takes multivitamin at home. In house pt reports nausea with last episode of vomited reported 2 days ago, last BM yesterday. Pt denies difficulty chewing/swallowing. In house pt reports ongoing suboptimal intake taking mostly liquids and crackers including soup, yogurt, ensure supplements etc. Pt agreeable to cheese and crackers for lunch.

## 2018-10-11 NOTE — CHART NOTE - NSCHARTNOTEFT_GEN_A_CORE
Upon Nutritional Assessment by the Registered Dietitian your patient was determined to meet criteria / has evidence of the following diagnosis/diagnoses:          [ ]  Mild Protein Calorie Malnutrition        [ ]  Moderate Protein Calorie Malnutrition        [ ] Severe Protein Calorie Malnutrition        [ ] Unspecified Protein Calorie Malnutrition        [ ] Underweight / BMI <19        [x ] Morbid Obesity / BMI > 40      Findings as based on:  [ ] Comprehensive nutrition assessment   [ ] Nutrition Focused Physical Exam  [ x] Other: Objective measure of height and weight, BMI 44.5       Nutrition Plan/Recommendations:  Weight management education deferred at this time given inadequate appetite/intake, will continue to monitor.         PROVIDER Section:     By signing this assessment you are acknowledging and agree with the diagnosis/diagnoses assigned by the Registered Dietitian    Comments:

## 2018-10-11 NOTE — DIETITIAN INITIAL EVALUATION ADULT. - NS FNS WEIGHT USED FOR CALC
251.1 lbs (standing 10/5),  lbs used for protein, fluid deferred to medical team given chemotherapy/admission

## 2018-10-11 NOTE — PROGRESS NOTE ADULT - ATTENDING COMMENTS
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 FISH and PCR negative. s/p Masha-Auto on 12/16/16. Pt was on sprycel maintenance. 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     - Inotuzumab- day 3.    - cont to keep plt ct >80K, keep hb >7  -Neutropenic, afebrile.  Blood cultures 10/9 negative.  Continue cefepime/posaconazole.   - cont keppra px.  Repeat head CT Thursday/Friday  -GI bleed has resolved, continue IV protonix- change to po tomorrow. 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 FISH and PCR negative. s/p Masha-Auto on 12/16/16. Pt was on sprycel maintenance. 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     - Inotuzumab- day 4.    - cont to keep plt ct >80K, keep hb >7  -Neutropenic, afebrile.  Blood cultures 10/9 negative.  Continue cefepime/posaconazole.   - cont keppra px.  Repeat head CT stable  -GI bleed has resolved, continue po protonix.

## 2018-10-11 NOTE — DIETITIAN INITIAL EVALUATION ADULT. - PROBLEM SELECTOR PLAN 3
Megaloblastic anemia hgb 9.3 acute drop from 4/18. May be also component of acute blood loss anemia in setting of thrombocytopenia ?upper GI bleed  -obtain STAT cbc w/ diff, anemia workup labs prior to any PRBC infusions,   -transfuse PRN h/h goal >7   -maintain active T&S

## 2018-10-11 NOTE — DIETITIAN INITIAL EVALUATION ADULT. - ETIOLOGY
decreased appetite in setting of nausea likely history of excessive calories and inadequate physical activity

## 2018-10-11 NOTE — DIETITIAN INITIAL EVALUATION ADULT. - NS AS NUTRI INTERV ED CONTENT3
Weight management education deferred at this time given decreased appetite/intake, will continue to follow

## 2018-10-11 NOTE — DIETITIAN INITIAL EVALUATION ADULT. - ENERGY NEEDS
Pt seen for length of stay. Pt with PMH including HTN, obesity, PH (+) ALL S/P chemotherapy, autologous PBSCT 12/2016 now admitted with SDH in setting of severe thrombocytopenia due to blast crisis found to have relapsed disease receiving further chemotherapy, course complicated by upper GIB.     Ht: 5'3", Wt: 251.1 lbs, BMI: 44.5 kg/m2, IBW: 115 lbs +/- 10%, %IBW: 218%  No edema, Skin intact

## 2018-10-11 NOTE — DIETITIAN INITIAL EVALUATION ADULT. - PROBLEM SELECTOR PLAN 1
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 No focal neurologic findings on exam.  -s/p DDAVP  -transfuse platelet to goal >80  -fall precautions  -Neurosurgery following, no intervention at this time, 4Hr repeat CT not preformed in timely manner 2/2 ?logistical issues. Patient AOx4 neuro exam intact, will get repeat CT now. Spoke to [facility] fellow (José Miguel MONTAÑO) regarding timing of 3rd (AM) CT head, was advised to get 3rd CT 6hrs after 2AM CT head.   -c/w seizure ppx w/ keppra 500mg BID  -Tylenol 650mg PRN for mild/mod pain; clarified w/ [facility] fellow, Okay to use tramadol for severe pain.   -neurochecks q4 hrs  -Zofran PRN

## 2018-10-11 NOTE — DIETITIAN INITIAL EVALUATION ADULT. - PROBLEM SELECTOR PLAN 7
IMPROVE score: 1  DVT ppx: contraindicated in setting of severe thrombocytopenia   DIET: Regular low sodium  Dr.Benziger Rosario   Internal Medicine   PGY-2   [phone] (long range pager)  [phone] (short range)

## 2018-10-11 NOTE — PROGRESS NOTE ADULT - PROBLEM SELECTOR PLAN 3
10/4 CT Head revealed small bilateral acute on chronic convexity subdural hematomas. Repeat CT Head on 10/5 and 10/6 is stable   Will repeat CT Head on 10/11  10/4 Given DDAVP   Continue Keppra 500mg BID for seizure ppx   Keep PLT >80K  Neuro checks q4 hrs  Pain management   Neurosurgery following, no intervention at this time- Do not tap Omaya 10/4 CT Head revealed small bilateral acute on chronic convexity subdural hematomas. Repeat CT Head on 10/5, 10/6 and 10/11 is stable   10/4 Given DDAVP   Continue Keppra 500mg BID for seizure ppx   Keep PLT >80K  Neuro checks q4 hrs  Pain management   Neurosurgery following, no intervention at this time- Do not tap Omaya

## 2018-10-11 NOTE — PROGRESS NOTE ADULT - PROBLEM SELECTOR PLAN 1
10/5 Peripheral flow confirms relapse   Continue Inotuzamab (Day 1, 8 and 15)  Monitor CBC/Lytes and transfuse/replete PRN  Anemia: 1 unit PRBCs x 1  Thrombocytopenia with SDH: 1 bag PLT with repeat level after. Per blood bank patient to receive 1/2 bags over 3 hours q 12 hours. HLA to be given once availible  Hyperphosphatemia: Phos Lo 4 tabs PO today  Strict Is and Os/Daily weights/Mouth Care  Allopurinol 300mg PO daily  Antiemetics  IVF 10/5 Peripheral flow confirms relapse with BCR/ABL rearrangement in 79.5% of cells  Continue Inotuzamab (Day 1, 8 and 15)  Monitor CBC/Lytes and transfuse/replete PRN  Thrombocytopenia with SDH: 1 bag PLT with repeat level after. Per blood bank patient to receive 1/2 bags over 3 hours q 12 hours. HLA to be given once available  Strict Is and Os/Daily weights/Mouth Care  Allopurinol 300mg PO daily  Antiemetics  IVF

## 2018-10-11 NOTE — DIETITIAN INITIAL EVALUATION ADULT. - PROBLEM SELECTOR PLAN 5
Now in presumed blast crisis w/ >70% blasts.   -start allopurinol 300mg OD for prevention of TLS, IVF hydration NS 150cc/hr   -in preparation of potential chemo therapy, check HIV, hepatitis panel, Quant gold.  -Spoke to Heme fellow Jeferson pager#[phone] and made aware agree w/ current management. Plan for BM biopsy in AM. Heme to follow in AM  -Patient has taken 10/4 dose of imatinib, will defer to heme in AM on resuming.

## 2018-10-11 NOTE — DIETITIAN INITIAL EVALUATION ADULT. - PROBLEM SELECTOR PLAN 2
In setting of relapse leukemia, platelet count 2 on arrival w/ active bleed  -s/p DDAVP, now s/p 2U platelets   -transfuse platelet to goal >80  -check HIV, hepatitis panel  -frequent h/h trend q4hrs for now.

## 2018-10-11 NOTE — DIETITIAN INITIAL EVALUATION ADULT. - PROBLEM SELECTOR PLAN 4
Patient reporting daily melanotic stools, in setting of severe thrombocytopenia   -will not preform rectal exam in setting of severe thrombocytopenia   -start protonix ggt  -check occult blood, stool count   -trend h/h q4, if h/h continues to drop may need CT ab/pelvis w/ oral and IV contrast. r/o RP bleed or intraabdominal bleed

## 2018-10-11 NOTE — DIETITIAN INITIAL EVALUATION ADULT. - ORAL INTAKE PTA
Pt reports decreased appetite/intake x ~2 weeks PTA, pt stated during this time she has been eating maybe 1 meal per day and taking crackers and ginger ale consuming <50% baseline intake due to generally feeling unwell./poor

## 2018-10-12 LAB
ALBUMIN SERPL ELPH-MCNC: 3.1 G/DL — LOW (ref 3.3–5)
ALBUMIN SERPL ELPH-MCNC: 3.5 G/DL — SIGNIFICANT CHANGE UP (ref 3.3–5)
ALP SERPL-CCNC: 162 U/L — HIGH (ref 40–120)
ALP SERPL-CCNC: 189 U/L — HIGH (ref 40–120)
ALT FLD-CCNC: 35 U/L — SIGNIFICANT CHANGE UP (ref 10–45)
ALT FLD-CCNC: 40 U/L — SIGNIFICANT CHANGE UP (ref 10–45)
ANION GAP SERPL CALC-SCNC: 12 MMOL/L — SIGNIFICANT CHANGE UP (ref 5–17)
ANION GAP SERPL CALC-SCNC: 8 MMOL/L — SIGNIFICANT CHANGE UP (ref 5–17)
AST SERPL-CCNC: 21 U/L — SIGNIFICANT CHANGE UP (ref 10–40)
AST SERPL-CCNC: 23 U/L — SIGNIFICANT CHANGE UP (ref 10–40)
BILIRUB SERPL-MCNC: 0.6 MG/DL — SIGNIFICANT CHANGE UP (ref 0.2–1.2)
BILIRUB SERPL-MCNC: 0.6 MG/DL — SIGNIFICANT CHANGE UP (ref 0.2–1.2)
BLD GP AB SCN SERPL QL: NEGATIVE — SIGNIFICANT CHANGE UP
BUN SERPL-MCNC: 10 MG/DL — SIGNIFICANT CHANGE UP (ref 7–23)
BUN SERPL-MCNC: 9 MG/DL — SIGNIFICANT CHANGE UP (ref 7–23)
CALCIUM SERPL-MCNC: 8.5 MG/DL — SIGNIFICANT CHANGE UP (ref 8.4–10.5)
CALCIUM SERPL-MCNC: 9.2 MG/DL — SIGNIFICANT CHANGE UP (ref 8.4–10.5)
CHLORIDE SERPL-SCNC: 106 MMOL/L — SIGNIFICANT CHANGE UP (ref 96–108)
CHLORIDE SERPL-SCNC: 107 MMOL/L — SIGNIFICANT CHANGE UP (ref 96–108)
CO2 SERPL-SCNC: 23 MMOL/L — SIGNIFICANT CHANGE UP (ref 22–31)
CO2 SERPL-SCNC: 25 MMOL/L — SIGNIFICANT CHANGE UP (ref 22–31)
CREAT SERPL-MCNC: 0.74 MG/DL — SIGNIFICANT CHANGE UP (ref 0.5–1.3)
CREAT SERPL-MCNC: 0.74 MG/DL — SIGNIFICANT CHANGE UP (ref 0.5–1.3)
GLUCOSE SERPL-MCNC: 98 MG/DL — SIGNIFICANT CHANGE UP (ref 70–99)
GLUCOSE SERPL-MCNC: 98 MG/DL — SIGNIFICANT CHANGE UP (ref 70–99)
HCT VFR BLD CALC: 19.5 % — CRITICAL LOW (ref 34.5–45)
HGB BLD-MCNC: 6.5 G/DL — CRITICAL LOW (ref 11.5–15.5)
LDH SERPL L TO P-CCNC: 168 U/L — SIGNIFICANT CHANGE UP (ref 50–242)
LDH SERPL L TO P-CCNC: 215 U/L — SIGNIFICANT CHANGE UP (ref 50–242)
MAGNESIUM SERPL-MCNC: 2 MG/DL — SIGNIFICANT CHANGE UP (ref 1.6–2.6)
MAGNESIUM SERPL-MCNC: 2 MG/DL — SIGNIFICANT CHANGE UP (ref 1.6–2.6)
MCHC RBC-ENTMCNC: 32 PG — SIGNIFICANT CHANGE UP (ref 27–34)
MCHC RBC-ENTMCNC: 33.1 GM/DL — SIGNIFICANT CHANGE UP (ref 32–36)
MCV RBC AUTO: 96.5 FL — SIGNIFICANT CHANGE UP (ref 80–100)
PHOSPHATE SERPL-MCNC: 2.8 MG/DL — SIGNIFICANT CHANGE UP (ref 2.5–4.5)
PHOSPHATE SERPL-MCNC: 3.2 MG/DL — SIGNIFICANT CHANGE UP (ref 2.5–4.5)
PLATELET # BLD AUTO: 31 K/UL — LOW (ref 150–400)
POTASSIUM SERPL-MCNC: 4.1 MMOL/L — SIGNIFICANT CHANGE UP (ref 3.5–5.3)
POTASSIUM SERPL-MCNC: 4.1 MMOL/L — SIGNIFICANT CHANGE UP (ref 3.5–5.3)
POTASSIUM SERPL-SCNC: 4.1 MMOL/L — SIGNIFICANT CHANGE UP (ref 3.5–5.3)
POTASSIUM SERPL-SCNC: 4.1 MMOL/L — SIGNIFICANT CHANGE UP (ref 3.5–5.3)
PROT SERPL-MCNC: 5.3 G/DL — LOW (ref 6–8.3)
PROT SERPL-MCNC: 6 G/DL — SIGNIFICANT CHANGE UP (ref 6–8.3)
RBC # BLD: 2.02 M/UL — LOW (ref 3.8–5.2)
RBC # FLD: 14.2 % — SIGNIFICANT CHANGE UP (ref 10.3–14.5)
RH IG SCN BLD-IMP: POSITIVE — SIGNIFICANT CHANGE UP
SODIUM SERPL-SCNC: 140 MMOL/L — SIGNIFICANT CHANGE UP (ref 135–145)
SODIUM SERPL-SCNC: 141 MMOL/L — SIGNIFICANT CHANGE UP (ref 135–145)
URATE SERPL-MCNC: 3 MG/DL — SIGNIFICANT CHANGE UP (ref 2.5–7)
URATE SERPL-MCNC: 3.5 MG/DL — SIGNIFICANT CHANGE UP (ref 2.5–7)
WBC # BLD: 0.5 K/UL — CRITICAL LOW (ref 3.8–10.5)
WBC # FLD AUTO: 0.5 K/UL — CRITICAL LOW (ref 3.8–10.5)

## 2018-10-12 PROCEDURE — 99232 SBSQ HOSP IP/OBS MODERATE 35: CPT

## 2018-10-12 RX ORDER — ACETAMINOPHEN 500 MG
650 TABLET ORAL EVERY 6 HOURS
Qty: 0 | Refills: 0 | Status: DISCONTINUED | OUTPATIENT
Start: 2018-10-12 | End: 2018-11-20

## 2018-10-12 RX ORDER — FUROSEMIDE 40 MG
40 TABLET ORAL ONCE
Qty: 0 | Refills: 0 | Status: COMPLETED | OUTPATIENT
Start: 2018-10-12 | End: 2018-10-12

## 2018-10-12 RX ADMIN — HYDROMORPHONE HYDROCHLORIDE 1 MILLIGRAM(S): 2 INJECTION INTRAMUSCULAR; INTRAVENOUS; SUBCUTANEOUS at 14:45

## 2018-10-12 RX ADMIN — CEFEPIME 100 MILLIGRAM(S): 1 INJECTION, POWDER, FOR SOLUTION INTRAMUSCULAR; INTRAVENOUS at 05:58

## 2018-10-12 RX ADMIN — HYDROMORPHONE HYDROCHLORIDE 1 MILLIGRAM(S): 2 INJECTION INTRAMUSCULAR; INTRAVENOUS; SUBCUTANEOUS at 14:33

## 2018-10-12 RX ADMIN — HYDROMORPHONE HYDROCHLORIDE 1 MILLIGRAM(S): 2 INJECTION INTRAMUSCULAR; INTRAVENOUS; SUBCUTANEOUS at 10:43

## 2018-10-12 RX ADMIN — HYDROMORPHONE HYDROCHLORIDE 1 MILLIGRAM(S): 2 INJECTION INTRAMUSCULAR; INTRAVENOUS; SUBCUTANEOUS at 23:20

## 2018-10-12 RX ADMIN — SODIUM CHLORIDE 50 MILLILITER(S): 9 INJECTION INTRAMUSCULAR; INTRAVENOUS; SUBCUTANEOUS at 08:08

## 2018-10-12 RX ADMIN — Medication 300 MILLIGRAM(S): at 12:37

## 2018-10-12 RX ADMIN — Medication 1 TABLET(S): at 12:37

## 2018-10-12 RX ADMIN — Medication 650 MILLIGRAM(S): at 22:30

## 2018-10-12 RX ADMIN — CEFEPIME 100 MILLIGRAM(S): 1 INJECTION, POWDER, FOR SOLUTION INTRAMUSCULAR; INTRAVENOUS at 13:47

## 2018-10-12 RX ADMIN — PANTOPRAZOLE SODIUM 40 MILLIGRAM(S): 20 TABLET, DELAYED RELEASE ORAL at 05:58

## 2018-10-12 RX ADMIN — Medication 650 MILLIGRAM(S): at 23:30

## 2018-10-12 RX ADMIN — POSACONAZOLE 300 MILLIGRAM(S): 100 TABLET, DELAYED RELEASE ORAL at 12:37

## 2018-10-12 RX ADMIN — HYDROMORPHONE HYDROCHLORIDE 1 MILLIGRAM(S): 2 INJECTION INTRAMUSCULAR; INTRAVENOUS; SUBCUTANEOUS at 03:09

## 2018-10-12 RX ADMIN — Medication 10 MILLIGRAM(S): at 08:03

## 2018-10-12 RX ADMIN — LEVETIRACETAM 500 MILLIGRAM(S): 250 TABLET, FILM COATED ORAL at 05:58

## 2018-10-12 RX ADMIN — HYDROMORPHONE HYDROCHLORIDE 1 MILLIGRAM(S): 2 INJECTION INTRAMUSCULAR; INTRAVENOUS; SUBCUTANEOUS at 19:35

## 2018-10-12 RX ADMIN — LEVETIRACETAM 500 MILLIGRAM(S): 250 TABLET, FILM COATED ORAL at 17:53

## 2018-10-12 RX ADMIN — CEFEPIME 100 MILLIGRAM(S): 1 INJECTION, POWDER, FOR SOLUTION INTRAMUSCULAR; INTRAVENOUS at 22:14

## 2018-10-12 RX ADMIN — Medication 40 MILLIGRAM(S): at 17:53

## 2018-10-12 RX ADMIN — HYDROMORPHONE HYDROCHLORIDE 1 MILLIGRAM(S): 2 INJECTION INTRAMUSCULAR; INTRAVENOUS; SUBCUTANEOUS at 19:55

## 2018-10-12 RX ADMIN — SODIUM CHLORIDE 50 MILLILITER(S): 9 INJECTION INTRAMUSCULAR; INTRAVENOUS; SUBCUTANEOUS at 05:58

## 2018-10-12 RX ADMIN — HYDROMORPHONE HYDROCHLORIDE 1 MILLIGRAM(S): 2 INJECTION INTRAMUSCULAR; INTRAVENOUS; SUBCUTANEOUS at 08:08

## 2018-10-12 RX ADMIN — HYDROMORPHONE HYDROCHLORIDE 1 MILLIGRAM(S): 2 INJECTION INTRAMUSCULAR; INTRAVENOUS; SUBCUTANEOUS at 03:30

## 2018-10-12 RX ADMIN — SODIUM CHLORIDE 50 MILLILITER(S): 9 INJECTION INTRAMUSCULAR; INTRAVENOUS; SUBCUTANEOUS at 23:08

## 2018-10-12 RX ADMIN — HYDROMORPHONE HYDROCHLORIDE 1 MILLIGRAM(S): 2 INJECTION INTRAMUSCULAR; INTRAVENOUS; SUBCUTANEOUS at 23:50

## 2018-10-12 NOTE — PROGRESS NOTE ADULT - PROBLEM SELECTOR PLAN 3
10/4 CT Head revealed small bilateral acute on chronic convexity subdural hematomas. Repeat CT Head on 10/5, 10/6 and 10/11 is stable   10/4 Given DDAVP   Continue Keppra 500mg BID for seizure ppx   Keep PLT >80K  Neuro checks q4 hrs  Pain management   Neurosurgery following, no intervention at this time- Do not tap Omaya 10/4 CT Head revealed small bilateral acute on chronic convexity subdural hematomas. Repeat CT Head on 10/5, 10/6 and 10/11 is stable   10/4 Given DDAVP   Continue Keppra 500mg BID for seizure ppx   Keep PLT >80K  Neuro checks q4 hrs  Pain management   Neurosurgery following, no intervention at this time. Contacted service today to evaluate when Omaya can be used, fellow to speak with Dr. Sellers and notify team of time frame

## 2018-10-12 NOTE — PROGRESS NOTE ADULT - PROBLEM SELECTOR PLAN 4
Mild, improving  Likely 2/2 chemotherapy and/or disease  Monitor daily CMP  May need to change Posaconazole to Mycamine, will monitor at this time  Avoid hepatotoxic medications No pharmacologic ppx 2/2 thrombocytopenia and SDH

## 2018-10-12 NOTE — PROGRESS NOTE ADULT - PROBLEM SELECTOR PLAN 1
10/5 Peripheral flow confirms relapse with BCR/ABL rearrangement in 79.5% of cells  Continue Inotuzamab (Day 1, 8 and 15)  Monitor CBC/Lytes and transfuse/replete PRN  Thrombocytopenia with SDH: 1 bag PLT with repeat level after. Per blood bank patient to receive 1/2 bags over 3 hours q 12 hours. HLA to be given once available  Strict Is and Os/Daily weights/Mouth Care  Allopurinol 300mg PO daily  Antiemetics  IVF 10/5 Peripheral flow confirms relapse with BCR/ABL rearrangement in 79.5% of cells  Continue Inotuzamab (Day 1, 8 and 15)  Monitor CBC/Lytes and transfuse/replete PRN  Anemia with increasing fatigue: 2 units PRBCs with Lasix 40mg IV between units  Thrombocytopenia with SDH: 1 bag PLT with repeat level after. Per blood bank patient to receive 1/2 bags over 3 hours q 12 hours. HLA to be given once available  Strict Is and Os/Daily weights/Mouth Care  Allopurinol 300mg PO daily  Antiemetics  IVF

## 2018-10-12 NOTE — PROGRESS NOTE ADULT - SUBJECTIVE AND OBJECTIVE BOX
Diagnosis: Relapsed ALL Ph (+)     Protocol/Chemo Regimen: Inotuzamab Day 1,8 and 15    Day: 5    Pt endorsed: Headache stable with pain medications    Review of Systems: Patient denies dizziness, visual changes, chest pain, palpitations, SOB, abdominal pain, vomiting, diarrhea or dysuria.     Pain scale: denies now     Diet: Regular     Allergies: No Known Drug Allergies  shellfish (Nausea; Vomiting)      ANTIMICROBIALS  cefepime   IVPB 2000 milliGRAM(s) IV Intermittent every 8 hours  posaconazole DR Tablet 300 milliGRAM(s) Oral daily      STANDING MEDICATIONS  allopurinol 300 milliGRAM(s) Oral daily  influenza   Vaccine 0.5 milliLiter(s) IntraMuscular once  levETIRAcetam 500 milliGRAM(s) Oral two times a day  multivitamin 1 Tablet(s) Oral daily  pantoprazole    Tablet 40 milliGRAM(s) Oral before breakfast  sodium chloride 0.9% lock flush 20 milliLiter(s) IV Push once  sodium chloride 0.9%. 1000 milliLiter(s) IV Continuous <Continuous>      PRN MEDICATIONS  acetaminophen   Tablet .. 650 milliGRAM(s) Oral every 6 hours PRN  HYDROmorphone  Injectable 1 milliGRAM(s) IV Push every 4 hours PRN  LORazepam   Injectable 0.5 milliGRAM(s) IV Push every 6 hours PRN  metoclopramide Injectable 10 milliGRAM(s) IV Push every 6 hours PRN  sodium chloride 0.9% lock flush 10 milliLiter(s) IV Push every 1 hour PRN  sodium chloride 0.9% lock flush 10 milliLiter(s) IV Push every 12 hours PRN      Vital Signs Last 24 Hrs  T(C): 37 (12 Oct 2018 05:58), Max: 37.4 (11 Oct 2018 21:48)  T(F): 98.6 (12 Oct 2018 05:58), Max: 99.3 (11 Oct 2018 21:48)  HR: 86 (12 Oct 2018 05:58) (82 - 98)  BP: 129/86 (12 Oct 2018 05:58) (129/86 - 137/88)  BP(mean): --  RR: 18 (12 Oct 2018 05:58) (18 - 18)  SpO2: 98% (12 Oct 2018 05:58) (98% - 100%)      PHYSICAL EXAM  General: NAD  HEENT: PERRLA, EOMI, clear oropharynx, anicteric sclera, pink conjunctiva  Neck: supple  CV: (+) S1/S2 RRR  Lungs: clear to auscultation, no wheezes or rales  Abdomen: soft, non-tender, non-distended (+) BS  Ext: no clubbing, cyanosis or edema  Skin: no rashes and no petechiae  Neuro: alert and oriented X 3, no focal deficits  Central Line: C/D/I       LABS:                     RADIOLOGY & ADDITIONAL STUDIES:    EXAM:  CT BRAIN                        PROCEDURE DATE:  10/11/2018    IMPRESSION: Small residual hypodense left frontoparietal subdural collection, without   significant change in size.   Unchanged posterior left parafalcine and left tentorial subdural hematoma.  No new areas of acute intracranial hemorrhage.      EXAM:  XR CHEST PORTABLE URGENT 1V                        PROCEDURE DATE:  10/09/2018    Impression: Low lung volumes without consolidations or edema. No pneumothorax  Right PICC line in expected location      EXAM:  CT BRAIN                        PROCEDURE DATE:  10/06/2018    Impression: No significant change when allowing for differences in   technique.      EXAM:  CT ABDOMEN AND PELVIS                        PROCEDURE DATE:  10/05/2018    IMPRESSION: No evidence of retroperitoneal bleed.  Cholelithiasis. Diagnosis: Relapsed ALL Ph (+)     Protocol/Chemo Regimen: Inotuzamab Day 1,8 and 15    Day: 5    Pt endorsed: Headache stable with pain medications    Review of Systems: Patient denies dizziness, visual changes, chest pain, palpitations, SOB, abdominal pain, vomiting, diarrhea or dysuria.     Pain scale: denies now     Diet: Regular     Allergies: No Known Drug Allergies  shellfish (Nausea; Vomiting)      ANTIMICROBIALS  cefepime   IVPB 2000 milliGRAM(s) IV Intermittent every 8 hours  posaconazole DR Tablet 300 milliGRAM(s) Oral daily      STANDING MEDICATIONS  allopurinol 300 milliGRAM(s) Oral daily  influenza   Vaccine 0.5 milliLiter(s) IntraMuscular once  levETIRAcetam 500 milliGRAM(s) Oral two times a day  multivitamin 1 Tablet(s) Oral daily  pantoprazole    Tablet 40 milliGRAM(s) Oral before breakfast  sodium chloride 0.9% lock flush 20 milliLiter(s) IV Push once  sodium chloride 0.9%. 1000 milliLiter(s) IV Continuous <Continuous>      PRN MEDICATIONS  acetaminophen   Tablet .. 650 milliGRAM(s) Oral every 6 hours PRN  HYDROmorphone  Injectable 1 milliGRAM(s) IV Push every 4 hours PRN  LORazepam   Injectable 0.5 milliGRAM(s) IV Push every 6 hours PRN  metoclopramide Injectable 10 milliGRAM(s) IV Push every 6 hours PRN  sodium chloride 0.9% lock flush 10 milliLiter(s) IV Push every 1 hour PRN  sodium chloride 0.9% lock flush 10 milliLiter(s) IV Push every 12 hours PRN      Vital Signs Last 24 Hrs  T(C): 37 (12 Oct 2018 05:58), Max: 37.4 (11 Oct 2018 21:48)  T(F): 98.6 (12 Oct 2018 05:58), Max: 99.3 (11 Oct 2018 21:48)  HR: 86 (12 Oct 2018 05:58) (82 - 98)  BP: 129/86 (12 Oct 2018 05:58) (129/86 - 137/88)  BP(mean): --  RR: 18 (12 Oct 2018 05:58) (18 - 18)  SpO2: 98% (12 Oct 2018 05:58) (98% - 100%)      PHYSICAL EXAM  General: NAD  HEENT: PERRLA, EOMI, clear oropharynx, anicteric sclera, pink conjunctiva  Neck: supple  CV: (+) S1/S2 RRR  Lungs: clear to auscultation, no wheezes or rales  Abdomen: soft, non-tender, non-distended (+) BS  Ext: no clubbing, cyanosis or edema  Skin: no rashes and no petechiae  Neuro: alert and oriented X 3, no focal deficits  Central Line: C/D/I       LABS:                                 6.5    0.5   )-----------( 31       ( 12 Oct 2018 07:09 )             19.5         Mean Cell Volume : 96.5 fl  Mean Cell Hemoglobin : 32.0 pg  Mean Cell Hemoglobin Concentration : 33.1 gm/dL  Auto Neutrophil # : x  Auto Lymphocyte # : x  Auto Monocyte # : x  Auto Eosinophil # : x  Auto Basophil # : x  Auto Neutrophil % : x  Auto Lymphocyte % : x  Auto Monocyte % : x  Auto Eosinophil % : x  Auto Basophil % : x      10-12    140  |  107  |  9   ----------------------------<  98  4.1   |  25  |  0.74    Ca    8.5      12 Oct 2018 07:09  Phos  3.2     10-12  Mg     2.0     10-12    TPro  5.3<L>  /  Alb  3.1<L>  /  TBili  0.6  /  DBili  x   /  AST  21  /  ALT  35  /  AlkPhos  162<H>  10-12      Mg 2.0  Phos 3.2      PT/INR - ( 11 Oct 2018 06:40 )   PT: 10.4 sec;   INR: 0.96 ratio         PTT - ( 11 Oct 2018 06:40 )  PTT:28.1 sec      Uric Acid 3.5        RADIOLOGY & ADDITIONAL STUDIES:    EXAM:  CT BRAIN                        PROCEDURE DATE:  10/11/2018    IMPRESSION: Small residual hypodense left frontoparietal subdural collection, without   significant change in size.   Unchanged posterior left parafalcine and left tentorial subdural hematoma.  No new areas of acute intracranial hemorrhage.      EXAM:  XR CHEST PORTABLE URGENT 1V                        PROCEDURE DATE:  10/09/2018    Impression: Low lung volumes without consolidations or edema. No pneumothorax  Right PICC line in expected location      EXAM:  CT BRAIN                        PROCEDURE DATE:  10/06/2018    Impression: No significant change when allowing for differences in   technique.      EXAM:  CT ABDOMEN AND PELVIS                        PROCEDURE DATE:  10/05/2018    IMPRESSION: No evidence of retroperitoneal bleed.  Cholelithiasis.

## 2018-10-12 NOTE — CHART NOTE - NSCHARTNOTEFT_GEN_A_CORE
Medicine PA Episodic Note     Called by RN to evaluate pt with temp of 101.5F. Pt seen and examined at bedside, in NAD. Patient with no acute complaints, HA stable. Denies c/o CP, SOB, palpitations, cough, diaphoresis, chills, N/V/D, abd pain or dysuria.      VITAL SIGNS:  T(C): 38.6 (10-12-18 @ 22:09), Max: 38.6 (10-12-18 @ 22:09)  HR: 100 (10-12-18 @ 22:09) (72 - 100)  BP: 135/85 (10-12-18 @ 22:09) (119/80 - 140/85)  RR: 18 (10-12-18 @ 22:09) (18 - 19)  SpO2: 100% (10-12-18 @ 22:09) (98% - 100%)  Wt(kg): --      LABORATORY:                  6.5    0.5   )-----------( 31       ( 12 Oct 2018 07:09 )             19.5     10-12    141  |  106  |  10  ----------------------------<  98  4.1   |  23  |  0.74    Ca    9.2      12 Oct 2018 18:49  Phos  2.8     10-12  Mg     2.0     10-12    TPro  6.0  /  Alb  3.5  /  TBili  0.6  /  DBili  x   /  AST  23  /  ALT  40  /  AlkPhos  189<H>  10-12      MICROBIOLOGY:   Culture - Blood (10.09.18 @ 04:59)    Specimen Source: .Blood Blood-Peripheral    Culture Results:   No growth to date.    Culture - Blood (10.09.18 @ 04:59)    Specimen Source: .Blood Blood-Catheter    Culture Results:   No growth to date.      RADIOLOGY:  CT Head No Cont (10.11.18 @ 09:20)  IMPRESSION:   Small residual hypodense left frontoparietal subdural collection, without   significant change in size.   Unchanged posterior left parafalcine and left tentorial subdural hematoma.  No new areas of acute intracranial hemorrhage.    Xray Chest 1 View- PORTABLE-Urgent (10.09.18 @ 00:59)  Impression:  Low lung volumes without consolidations or edema. No pneumothorax  Right PICC line in expected location      MEDICATIONS:   allopurinol 300 milliGRAM(s) Oral daily  cefepime   IVPB      cefepime   IVPB 2000 milliGRAM(s) IV Intermittent every 8 hours  influenza   Vaccine 0.5 milliLiter(s) IntraMuscular once  levETIRAcetam 500 milliGRAM(s) Oral two times a day  multivitamin 1 Tablet(s) Oral daily  pantoprazole    Tablet 40 milliGRAM(s) Oral before breakfast  posaconazole DR Tablet 300 milliGRAM(s) Oral daily  sodium chloride 0.9% lock flush 20 milliLiter(s) IV Push once  sodium chloride 0.9%. 1000 milliLiter(s) IV Continuous <Continuous>      PHYSICAL EXAM:  Constitutional: AOx3, in NAD, comfortable and non-toxic appearing  Resp: CTA b/l, respirations even and non-labored   CV: RRR, S1/S2  GI: soft, nontender, nondistended  Ext: no LE edema      ASSESSMENT/PLAN:   HPI:  This is a 59 yo F with PMHx of HTN, Obesity and ALL Ph(+) status post 4 cycles of R-HyperCVAD with IT MTX x2 (via Omaya) followed by Autologous HPSCT on 12/16/16 then on Sprycel maintenance admitted for relapsed disease. The patients hospital course has been complicated by SDH, transaminitis and upper GIB.    # Neutropenic Fever  - Tylenol and cooling measures prn for pyrexia.  - Repeat BCx/UCx ordered.  - CXR (10/9) clear.  - Continue with cefepime, posaconazole.  - Continue IV hydration.  - Monitor vital signs q4hr.  Will endorse to primary team in am; attending to follow.      Bebe Zavala PA-C  Department of Medicine  #[id number]  # Medicine PA Episodic Note     Called by RN to evaluate pt with temp of 101.5F. Pt seen and examined at bedside, in NAD. Patient with no acute complaints, HA stable. Denies c/o CP, SOB, palpitations, cough, diaphoresis, chills, N/V/D, abd pain or dysuria.      VITAL SIGNS:  T(C): 38.6 (10-12-18 @ 22:09), Max: 38.6 (10-12-18 @ 22:09)  HR: 100 (10-12-18 @ 22:09) (72 - 100)  BP: 135/85 (10-12-18 @ 22:09) (119/80 - 140/85)  RR: 18 (10-12-18 @ 22:09) (18 - 19)  SpO2: 100% (10-12-18 @ 22:09) (98% - 100%)  Wt(kg): --      LABORATORY:                  6.5    0.5   )-----------( 31       ( 12 Oct 2018 07:09 )             19.5     10-12    141  |  106  |  10  ----------------------------<  98  4.1   |  23  |  0.74    Ca    9.2      12 Oct 2018 18:49  Phos  2.8     10-12  Mg     2.0     10-12    TPro  6.0  /  Alb  3.5  /  TBili  0.6  /  DBili  x   /  AST  23  /  ALT  40  /  AlkPhos  189<H>  10-12      MICROBIOLOGY:   Culture - Blood (10.09.18 @ 04:59)    Specimen Source: .Blood Blood-Peripheral    Culture Results:   No growth to date.    Culture - Blood (10.09.18 @ 04:59)    Specimen Source: .Blood Blood-Catheter    Culture Results:   No growth to date.      RADIOLOGY:  CT Head No Cont (10.11.18 @ 09:20)  IMPRESSION:   Small residual hypodense left frontoparietal subdural collection, without   significant change in size.   Unchanged posterior left parafalcine and left tentorial subdural hematoma.  No new areas of acute intracranial hemorrhage.    Xray Chest 1 View- PORTABLE-Urgent (10.09.18 @ 00:59)  Impression:  Low lung volumes without consolidations or edema. No pneumothorax  Right PICC line in expected location      MEDICATIONS:   allopurinol 300 milliGRAM(s) Oral daily  cefepime   IVPB      cefepime   IVPB 2000 milliGRAM(s) IV Intermittent every 8 hours  influenza   Vaccine 0.5 milliLiter(s) IntraMuscular once  levETIRAcetam 500 milliGRAM(s) Oral two times a day  multivitamin 1 Tablet(s) Oral daily  pantoprazole    Tablet 40 milliGRAM(s) Oral before breakfast  posaconazole DR Tablet 300 milliGRAM(s) Oral daily  sodium chloride 0.9% lock flush 20 milliLiter(s) IV Push once  sodium chloride 0.9%. 1000 milliLiter(s) IV Continuous <Continuous>      PHYSICAL EXAM:  Constitutional: AOx3, in NAD, comfortable and non-toxic appearing  Resp: CTA b/l, respirations even and non-labored   CV: RRR, S1/S2  GI: soft, nontender, nondistended  Ext: no LE edema      ASSESSMENT/PLAN:   HPI:  This is a 59 yo F with PMHx of HTN, Obesity and ALL Ph(+) status post 4 cycles of R-HyperCVAD with IT MTX x2 (via Omaya) followed by Autologous HPSCT on 12/16/16 then on Sprycel maintenance admitted for relapsed disease. The patients hospital course has been complicated by SDH, transaminitis and upper GIB.  Now acutely presenting with fever.    # Neutropenic Fever  - Tylenol and cooling measures prn for pyrexia.  - Repeat BCx/UCx ordered.  - CXR (10/9) clear.  - Continue with cefepime, posaconazole.  - Continue IV hydration.  - Monitor vital signs q4hr.  Will endorse to primary team in am; attending to follow.      Bebe Zavala PA-C  Department of Medicine  #[id number]  #

## 2018-10-12 NOTE — PROGRESS NOTE ADULT - ATTENDING COMMENTS
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 FISH and PCR negative. s/p Masha-Auto on 12/16/16. Pt was on sprycel maintenance. 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     - Inotuzumab- day 4.    - cont to keep plt ct >80K, keep hb >7  -Neutropenic, afebrile.  Blood cultures 10/9 negative.  Continue cefepime/posaconazole.   - cont keppra px.  Repeat head CT stable  -GI bleed has resolved, continue po protonix. 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 FISH and PCR negative. s/p Masha-Auto on 12/16/16. Pt was on sprycel maintenance. Now admitted for subdural hematoma in setting of severe thrombocytopenia 2/2 blast crisis found to have ALL relapse.    - Inotuzumab- day 5.    - cont to keep plt ct >80K, keep hb >7  - Neutropenic, afebrile.  Blood cultures 10/9 negative.  Continue cefepime/posaconazole.   - cont keppra px.  Repeat head CT stable 10/11.  Need to touch base with neurosurgery regarding when can patient undergo LP.  - GI bleed has resolved, continue po protonix.  - OOB, ambulation.

## 2018-10-13 LAB
ALBUMIN SERPL ELPH-MCNC: 3.2 G/DL — LOW (ref 3.3–5)
ALP SERPL-CCNC: 161 U/L — HIGH (ref 40–120)
ALT FLD-CCNC: 34 U/L — SIGNIFICANT CHANGE UP (ref 10–45)
ANION GAP SERPL CALC-SCNC: 11 MMOL/L — SIGNIFICANT CHANGE UP (ref 5–17)
AST SERPL-CCNC: 26 U/L — SIGNIFICANT CHANGE UP (ref 10–40)
BASOPHILS # BLD AUTO: 0 K/UL — SIGNIFICANT CHANGE UP (ref 0–0.2)
BASOPHILS NFR BLD AUTO: 0 % — SIGNIFICANT CHANGE UP (ref 0–2)
BILIRUB SERPL-MCNC: 0.6 MG/DL — SIGNIFICANT CHANGE UP (ref 0.2–1.2)
BUN SERPL-MCNC: 8 MG/DL — SIGNIFICANT CHANGE UP (ref 7–23)
CALCIUM SERPL-MCNC: 8.7 MG/DL — SIGNIFICANT CHANGE UP (ref 8.4–10.5)
CHLORIDE SERPL-SCNC: 104 MMOL/L — SIGNIFICANT CHANGE UP (ref 96–108)
CO2 SERPL-SCNC: 24 MMOL/L — SIGNIFICANT CHANGE UP (ref 22–31)
CREAT SERPL-MCNC: 0.73 MG/DL — SIGNIFICANT CHANGE UP (ref 0.5–1.3)
EOSINOPHIL # BLD AUTO: 0 K/UL — SIGNIFICANT CHANGE UP (ref 0–0.5)
EOSINOPHIL NFR BLD AUTO: 0 % — SIGNIFICANT CHANGE UP (ref 0–6)
GLUCOSE SERPL-MCNC: 115 MG/DL — HIGH (ref 70–99)
HCT VFR BLD CALC: 21.6 % — LOW (ref 34.5–45)
HGB BLD-MCNC: 7.4 G/DL — LOW (ref 11.5–15.5)
LDH SERPL L TO P-CCNC: 227 U/L — SIGNIFICANT CHANGE UP (ref 50–242)
LYMPHOCYTES # BLD AUTO: 80 % — HIGH (ref 13–44)
LYMPHOCYTES # BLD AUTO: SIGNIFICANT CHANGE UP (ref 1–3.3)
MAGNESIUM SERPL-MCNC: 2 MG/DL — SIGNIFICANT CHANGE UP (ref 1.6–2.6)
MCHC RBC-ENTMCNC: 33 PG — SIGNIFICANT CHANGE UP (ref 27–34)
MCHC RBC-ENTMCNC: 34.4 GM/DL — SIGNIFICANT CHANGE UP (ref 32–36)
MCV RBC AUTO: 96 FL — SIGNIFICANT CHANGE UP (ref 80–100)
MONOCYTES # BLD AUTO: SIGNIFICANT CHANGE UP (ref 0–0.9)
MONOCYTES NFR BLD AUTO: 0 % — SIGNIFICANT CHANGE UP (ref 2–14)
NEUTROPHILS # BLD AUTO: SIGNIFICANT CHANGE UP (ref 1.8–7.4)
NEUTROPHILS NFR BLD AUTO: 0 % — LOW (ref 43–77)
PHOSPHATE SERPL-MCNC: 3.1 MG/DL — SIGNIFICANT CHANGE UP (ref 2.5–4.5)
PLATELET # BLD AUTO: 13 K/UL — CRITICAL LOW (ref 150–400)
POTASSIUM SERPL-MCNC: 3.6 MMOL/L — SIGNIFICANT CHANGE UP (ref 3.5–5.3)
POTASSIUM SERPL-SCNC: 3.6 MMOL/L — SIGNIFICANT CHANGE UP (ref 3.5–5.3)
PROT SERPL-MCNC: 5.7 G/DL — LOW (ref 6–8.3)
RBC # BLD: 2.25 M/UL — LOW (ref 3.8–5.2)
RBC # FLD: 14 % — SIGNIFICANT CHANGE UP (ref 10.3–14.5)
SODIUM SERPL-SCNC: 139 MMOL/L — SIGNIFICANT CHANGE UP (ref 135–145)
URATE SERPL-MCNC: 2.8 MG/DL — SIGNIFICANT CHANGE UP (ref 2.5–7)
WBC # BLD: 0.7 K/UL — CRITICAL LOW (ref 3.8–10.5)
WBC # FLD AUTO: 0.7 K/UL — CRITICAL LOW (ref 3.8–10.5)

## 2018-10-13 PROCEDURE — 99232 SBSQ HOSP IP/OBS MODERATE 35: CPT | Mod: GC

## 2018-10-13 RX ORDER — MICAFUNGIN SODIUM 100 MG/1
100 INJECTION, POWDER, LYOPHILIZED, FOR SOLUTION INTRAVENOUS DAILY
Qty: 0 | Refills: 0 | Status: DISCONTINUED | OUTPATIENT
Start: 2018-10-14 | End: 2018-11-15

## 2018-10-13 RX ORDER — HYDROMORPHONE HYDROCHLORIDE 2 MG/ML
1 INJECTION INTRAMUSCULAR; INTRAVENOUS; SUBCUTANEOUS EVERY 4 HOURS
Qty: 0 | Refills: 0 | Status: DISCONTINUED | OUTPATIENT
Start: 2018-10-13 | End: 2018-10-15

## 2018-10-13 RX ADMIN — Medication 1 TABLET(S): at 11:38

## 2018-10-13 RX ADMIN — HYDROMORPHONE HYDROCHLORIDE 1 MILLIGRAM(S): 2 INJECTION INTRAMUSCULAR; INTRAVENOUS; SUBCUTANEOUS at 08:05

## 2018-10-13 RX ADMIN — CEFEPIME 100 MILLIGRAM(S): 1 INJECTION, POWDER, FOR SOLUTION INTRAMUSCULAR; INTRAVENOUS at 22:22

## 2018-10-13 RX ADMIN — HYDROMORPHONE HYDROCHLORIDE 1 MILLIGRAM(S): 2 INJECTION INTRAMUSCULAR; INTRAVENOUS; SUBCUTANEOUS at 08:20

## 2018-10-13 RX ADMIN — SODIUM CHLORIDE 50 MILLILITER(S): 9 INJECTION INTRAMUSCULAR; INTRAVENOUS; SUBCUTANEOUS at 22:22

## 2018-10-13 RX ADMIN — LEVETIRACETAM 500 MILLIGRAM(S): 250 TABLET, FILM COATED ORAL at 17:58

## 2018-10-13 RX ADMIN — POSACONAZOLE 300 MILLIGRAM(S): 100 TABLET, DELAYED RELEASE ORAL at 11:38

## 2018-10-13 RX ADMIN — Medication 10 MILLIGRAM(S): at 11:36

## 2018-10-13 RX ADMIN — HYDROMORPHONE HYDROCHLORIDE 1 MILLIGRAM(S): 2 INJECTION INTRAMUSCULAR; INTRAVENOUS; SUBCUTANEOUS at 20:22

## 2018-10-13 RX ADMIN — Medication 300 MILLIGRAM(S): at 11:38

## 2018-10-13 RX ADMIN — CEFEPIME 100 MILLIGRAM(S): 1 INJECTION, POWDER, FOR SOLUTION INTRAMUSCULAR; INTRAVENOUS at 06:27

## 2018-10-13 RX ADMIN — HYDROMORPHONE HYDROCHLORIDE 1 MILLIGRAM(S): 2 INJECTION INTRAMUSCULAR; INTRAVENOUS; SUBCUTANEOUS at 20:00

## 2018-10-13 RX ADMIN — PANTOPRAZOLE SODIUM 40 MILLIGRAM(S): 20 TABLET, DELAYED RELEASE ORAL at 06:27

## 2018-10-13 RX ADMIN — HYDROMORPHONE HYDROCHLORIDE 1 MILLIGRAM(S): 2 INJECTION INTRAMUSCULAR; INTRAVENOUS; SUBCUTANEOUS at 13:57

## 2018-10-13 RX ADMIN — CEFEPIME 100 MILLIGRAM(S): 1 INJECTION, POWDER, FOR SOLUTION INTRAMUSCULAR; INTRAVENOUS at 13:09

## 2018-10-13 RX ADMIN — LEVETIRACETAM 500 MILLIGRAM(S): 250 TABLET, FILM COATED ORAL at 06:27

## 2018-10-13 RX ADMIN — HYDROMORPHONE HYDROCHLORIDE 1 MILLIGRAM(S): 2 INJECTION INTRAMUSCULAR; INTRAVENOUS; SUBCUTANEOUS at 14:15

## 2018-10-13 RX ADMIN — SODIUM CHLORIDE 50 MILLILITER(S): 9 INJECTION INTRAMUSCULAR; INTRAVENOUS; SUBCUTANEOUS at 06:27

## 2018-10-13 NOTE — PROGRESS NOTE ADULT - PROBLEM SELECTOR PLAN 3
10/4 CT Head revealed small bilateral acute on chronic convexity subdural hematomas. Repeat CT Head on 10/5, 10/6 and 10/11 is stable   10/4 Given DDAVP   Continue Keppra 500mg BID for seizure ppx   Keep PLT >80K  Neuro checks q4 hrs  Pain management   Neurosurgery following, no intervention at this time. Contacted service today to evaluate when Omaya can be used, fellow to speak with Dr. Sellers and notify team of time frame

## 2018-10-13 NOTE — PROGRESS NOTE ADULT - PROBLEM SELECTOR PLAN 1
10/5 Peripheral flow confirms relapse with BCR/ABL rearrangement in 79.5% of cells  Continue Inotuzamab (Day 1, 8 and 15)  Monitor CBC/Lytes and transfuse/replete PRN  Anemia with increasing fatigue: 2 units PRBCs with Lasix 40mg IV between units  Thrombocytopenia with SDH: 1 bag PLT with repeat level after. Per blood bank patient to receive 1/2 bags over 3 hours q 12 hours. HLA to be given once available  Strict Is and Os/Daily weights/Mouth Care  Allopurinol 300mg PO daily  Antiemetics  IVF 10/5 Peripheral flow confirms relapse with BCR/ABL rearrangement in 79.5% of cells  Continue Inotuzamab (Day 1, 8 and 15)  Monitor CBC/Lytes and transfuse/replete PRN  Thrombocytopenia with SDH: 1 bag PLT with repeat level after. Per blood bank patient to receive 1/2 bags over 3 hours q 12 hours. HLA to be given once available  Strict Is and Os/Daily weights/Mouth Care  Allopurinol 300mg PO daily  Antiemetics  IVF

## 2018-10-13 NOTE — PROGRESS NOTE ADULT - ASSESSMENT
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 The patients hospital course has been complicated by SDH, transaminitis and upper GIB. 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 Now being treated with Inatuzumab day 1, 8, 15. The patients hospital course has been complicated by SDH, transaminitis and upper GIB.

## 2018-10-13 NOTE — PROGRESS NOTE ADULT - ATTENDING COMMENTS
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 FISH and PCR negative. s/p Masha-Auto on 12/16/16. Pt was on sprycel maintenance. Now admitted for subdural hematoma in setting of severe thrombocytopenia 2/2 blast crisis found to have ALL relapse.    - Inotuzumab- day 5.    - cont to keep plt ct >80K, keep hb >7  - Neutropenic, afebrile.  Blood cultures 10/9 negative.  Continue cefepime/posaconazole.   - cont keppra px.  Repeat head CT stable 10/11.  Need to touch base with neurosurgery regarding when can patient undergo LP.  - GI bleed has resolved, continue po protonix.  - OOB, ambulation. 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 FISH and PCR negative. s/p Masha-Auto on 12/16/16. Pt was on sprycel maintenance. 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  Now getting Inotuzumab C1 day 6    -febrile Tmax to 101.5. f/u Cx's, on Cefepime/Posaconazole. Will change Posaconazole to Micafungin given less hepatoxicity with the latter -and pt is s/p Inotuz, which can cause liver toxicity  - cont to keep plt ct >80K, keep hb >7  - cont keppra px.  Repeat head CT stable 10/11.  Need to touch base with neurosurgery regarding when can patient undergo LP.  - GI bleed has resolved, continue po protonix.  - OOB, ambulation.

## 2018-10-13 NOTE — PROGRESS NOTE ADULT - SUBJECTIVE AND OBJECTIVE BOX
Diagnosis: Relapsed ALL Ph (+)     Protocol/Chemo Regimen: Inotuzamab Day 1,8 and 15    Day: 6    Pt endorsed: Headache stable with pain medications    Review of Systems: Patient denies dizziness, visual changes, chest pain, palpitations, SOB, abdominal pain, vomiting, diarrhea or dysuria.     Pain scale: denies now     Diet: Regular    Allergies    No Known Drug Allergies  shellfish (Nausea; Vomiting)    Intolerances        ANTIMICROBIALS  cefepime   IVPB      cefepime   IVPB 2000 milliGRAM(s) IV Intermittent every 8 hours  posaconazole DR Tablet 300 milliGRAM(s) Oral daily      HEME/ONC MEDICATIONS      STANDING MEDICATIONS  allopurinol 300 milliGRAM(s) Oral daily  influenza   Vaccine 0.5 milliLiter(s) IntraMuscular once  levETIRAcetam 500 milliGRAM(s) Oral two times a day  multivitamin 1 Tablet(s) Oral daily  pantoprazole    Tablet 40 milliGRAM(s) Oral before breakfast  sodium chloride 0.9% lock flush 20 milliLiter(s) IV Push once  sodium chloride 0.9%. 1000 milliLiter(s) IV Continuous <Continuous>      PRN MEDICATIONS  acetaminophen   Tablet .. 650 milliGRAM(s) Oral every 6 hours PRN  acetaminophen   Tablet .. 650 milliGRAM(s) Oral every 6 hours PRN  HYDROmorphone  Injectable 1 milliGRAM(s) IV Push every 4 hours PRN  LORazepam   Injectable 0.5 milliGRAM(s) IV Push every 6 hours PRN  metoclopramide Injectable 10 milliGRAM(s) IV Push every 6 hours PRN  sodium chloride 0.9% lock flush 10 milliLiter(s) IV Push every 1 hour PRN  sodium chloride 0.9% lock flush 10 milliLiter(s) IV Push every 12 hours PRN        Vital Signs Last 24 Hrs  T(C): 37 (13 Oct 2018 06:02), Max: 38.6 (12 Oct 2018 22:09)  T(F): 98.6 (13 Oct 2018 06:02), Max: 101.5 (12 Oct 2018 22:09)  HR: 80 (13 Oct 2018 06:02) (72 - 100)  BP: 133/71 (13 Oct 2018 06:02) (119/80 - 140/85)  BP(mean): --  RR: 18 (13 Oct 2018 06:02) (18 - 19)  SpO2: 99% (13 Oct 2018 06:02) (99% - 100%)    PHYSICAL EXAM  General: NAD  HEENT: PERRLA, EOMI, clear oropharynx, anicteric sclera, pink conjunctiva  Neck: supple  CV: (+) S1/S2 RRR  Lungs: clear to auscultation, no wheezes or rales  Abdomen: soft, non-tender, non-distended (+) BS  Ext: no clubbing, cyanosis or edema  Skin: no rashes and no petechiae  Neuro: alert and oriented X 3, no focal deficits  Central Line: C/D/I     RECENT CULTURES:  10-09 @ 04:59  .Blood Blood-Catheter  --  --  --    No growth to date.  -

## 2018-10-14 LAB
ALBUMIN SERPL ELPH-MCNC: 3.1 G/DL — LOW (ref 3.3–5)
ALP SERPL-CCNC: 149 U/L — HIGH (ref 40–120)
ALT FLD-CCNC: 32 U/L — SIGNIFICANT CHANGE UP (ref 10–45)
ANION GAP SERPL CALC-SCNC: 9 MMOL/L — SIGNIFICANT CHANGE UP (ref 5–17)
AST SERPL-CCNC: 24 U/L — SIGNIFICANT CHANGE UP (ref 10–40)
BASOPHILS # BLD AUTO: 0 K/UL — SIGNIFICANT CHANGE UP (ref 0–0.2)
BASOPHILS NFR BLD AUTO: 0 % — SIGNIFICANT CHANGE UP (ref 0–2)
BILIRUB SERPL-MCNC: 0.4 MG/DL — SIGNIFICANT CHANGE UP (ref 0.2–1.2)
BUN SERPL-MCNC: 10 MG/DL — SIGNIFICANT CHANGE UP (ref 7–23)
CALCIUM SERPL-MCNC: 8.7 MG/DL — SIGNIFICANT CHANGE UP (ref 8.4–10.5)
CHLORIDE SERPL-SCNC: 105 MMOL/L — SIGNIFICANT CHANGE UP (ref 96–108)
CO2 SERPL-SCNC: 25 MMOL/L — SIGNIFICANT CHANGE UP (ref 22–31)
CREAT SERPL-MCNC: 0.77 MG/DL — SIGNIFICANT CHANGE UP (ref 0.5–1.3)
CULTURE RESULTS: NO GROWTH — SIGNIFICANT CHANGE UP
CULTURE RESULTS: SIGNIFICANT CHANGE UP
CULTURE RESULTS: SIGNIFICANT CHANGE UP
EOSINOPHIL # BLD AUTO: 0 K/UL — SIGNIFICANT CHANGE UP (ref 0–0.5)
EOSINOPHIL NFR BLD AUTO: 0 % — SIGNIFICANT CHANGE UP (ref 0–6)
GLUCOSE SERPL-MCNC: 94 MG/DL — SIGNIFICANT CHANGE UP (ref 70–99)
HCT VFR BLD CALC: 18.9 % — CRITICAL LOW (ref 34.5–45)
HGB BLD-MCNC: 6.6 G/DL — CRITICAL LOW (ref 11.5–15.5)
LDH SERPL L TO P-CCNC: 241 U/L — SIGNIFICANT CHANGE UP (ref 50–242)
LYMPHOCYTES # BLD AUTO: 0.6 K/UL — LOW (ref 1–3.3)
LYMPHOCYTES # BLD AUTO: 96 % — HIGH (ref 13–44)
MAGNESIUM SERPL-MCNC: 2 MG/DL — SIGNIFICANT CHANGE UP (ref 1.6–2.6)
MCHC RBC-ENTMCNC: 33.9 PG — SIGNIFICANT CHANGE UP (ref 27–34)
MCHC RBC-ENTMCNC: 35.2 GM/DL — SIGNIFICANT CHANGE UP (ref 32–36)
MCV RBC AUTO: 96.2 FL — SIGNIFICANT CHANGE UP (ref 80–100)
MONOCYTES # BLD AUTO: 0 K/UL — SIGNIFICANT CHANGE UP (ref 0–0.9)
MONOCYTES NFR BLD AUTO: 0 % — LOW (ref 2–14)
NEUTROPHILS # BLD AUTO: 0 K/UL — LOW (ref 1.8–7.4)
NEUTROPHILS NFR BLD AUTO: 0 % — LOW (ref 43–77)
PHOSPHATE SERPL-MCNC: 3.1 MG/DL — SIGNIFICANT CHANGE UP (ref 2.5–4.5)
PLATELET # BLD AUTO: 6 K/UL — CRITICAL LOW (ref 150–400)
POTASSIUM SERPL-MCNC: 3.9 MMOL/L — SIGNIFICANT CHANGE UP (ref 3.5–5.3)
POTASSIUM SERPL-SCNC: 3.9 MMOL/L — SIGNIFICANT CHANGE UP (ref 3.5–5.3)
PROT SERPL-MCNC: 5.3 G/DL — LOW (ref 6–8.3)
RBC # BLD: 1.96 M/UL — LOW (ref 3.8–5.2)
RBC # FLD: 14.2 % — SIGNIFICANT CHANGE UP (ref 10.3–14.5)
SODIUM SERPL-SCNC: 139 MMOL/L — SIGNIFICANT CHANGE UP (ref 135–145)
SPECIMEN SOURCE: SIGNIFICANT CHANGE UP
URATE SERPL-MCNC: 2.6 MG/DL — SIGNIFICANT CHANGE UP (ref 2.5–7)
WBC # BLD: 0.6 K/UL — CRITICAL LOW (ref 3.8–10.5)
WBC # FLD AUTO: 0.6 K/UL — CRITICAL LOW (ref 3.8–10.5)

## 2018-10-14 PROCEDURE — 99232 SBSQ HOSP IP/OBS MODERATE 35: CPT | Mod: GC

## 2018-10-14 RX ADMIN — HYDROMORPHONE HYDROCHLORIDE 1 MILLIGRAM(S): 2 INJECTION INTRAMUSCULAR; INTRAVENOUS; SUBCUTANEOUS at 21:56

## 2018-10-14 RX ADMIN — LEVETIRACETAM 500 MILLIGRAM(S): 250 TABLET, FILM COATED ORAL at 17:06

## 2018-10-14 RX ADMIN — HYDROMORPHONE HYDROCHLORIDE 1 MILLIGRAM(S): 2 INJECTION INTRAMUSCULAR; INTRAVENOUS; SUBCUTANEOUS at 06:10

## 2018-10-14 RX ADMIN — HYDROMORPHONE HYDROCHLORIDE 1 MILLIGRAM(S): 2 INJECTION INTRAMUSCULAR; INTRAVENOUS; SUBCUTANEOUS at 09:59

## 2018-10-14 RX ADMIN — HYDROMORPHONE HYDROCHLORIDE 1 MILLIGRAM(S): 2 INJECTION INTRAMUSCULAR; INTRAVENOUS; SUBCUTANEOUS at 22:33

## 2018-10-14 RX ADMIN — SODIUM CHLORIDE 50 MILLILITER(S): 9 INJECTION INTRAMUSCULAR; INTRAVENOUS; SUBCUTANEOUS at 05:41

## 2018-10-14 RX ADMIN — CEFEPIME 100 MILLIGRAM(S): 1 INJECTION, POWDER, FOR SOLUTION INTRAMUSCULAR; INTRAVENOUS at 14:00

## 2018-10-14 RX ADMIN — CEFEPIME 100 MILLIGRAM(S): 1 INJECTION, POWDER, FOR SOLUTION INTRAMUSCULAR; INTRAVENOUS at 21:15

## 2018-10-14 RX ADMIN — LEVETIRACETAM 500 MILLIGRAM(S): 250 TABLET, FILM COATED ORAL at 05:41

## 2018-10-14 RX ADMIN — HYDROMORPHONE HYDROCHLORIDE 1 MILLIGRAM(S): 2 INJECTION INTRAMUSCULAR; INTRAVENOUS; SUBCUTANEOUS at 14:01

## 2018-10-14 RX ADMIN — Medication 300 MILLIGRAM(S): at 12:03

## 2018-10-14 RX ADMIN — HYDROMORPHONE HYDROCHLORIDE 1 MILLIGRAM(S): 2 INJECTION INTRAMUSCULAR; INTRAVENOUS; SUBCUTANEOUS at 10:15

## 2018-10-14 RX ADMIN — PANTOPRAZOLE SODIUM 40 MILLIGRAM(S): 20 TABLET, DELAYED RELEASE ORAL at 05:41

## 2018-10-14 RX ADMIN — HYDROMORPHONE HYDROCHLORIDE 1 MILLIGRAM(S): 2 INJECTION INTRAMUSCULAR; INTRAVENOUS; SUBCUTANEOUS at 14:15

## 2018-10-14 RX ADMIN — HYDROMORPHONE HYDROCHLORIDE 1 MILLIGRAM(S): 2 INJECTION INTRAMUSCULAR; INTRAVENOUS; SUBCUTANEOUS at 17:56

## 2018-10-14 RX ADMIN — SODIUM CHLORIDE 50 MILLILITER(S): 9 INJECTION INTRAMUSCULAR; INTRAVENOUS; SUBCUTANEOUS at 17:06

## 2018-10-14 RX ADMIN — HYDROMORPHONE HYDROCHLORIDE 1 MILLIGRAM(S): 2 INJECTION INTRAMUSCULAR; INTRAVENOUS; SUBCUTANEOUS at 06:30

## 2018-10-14 RX ADMIN — MICAFUNGIN SODIUM 105 MILLIGRAM(S): 100 INJECTION, POWDER, LYOPHILIZED, FOR SOLUTION INTRAVENOUS at 12:03

## 2018-10-14 RX ADMIN — Medication 650 MILLIGRAM(S): at 17:56

## 2018-10-14 RX ADMIN — HYDROMORPHONE HYDROCHLORIDE 1 MILLIGRAM(S): 2 INJECTION INTRAMUSCULAR; INTRAVENOUS; SUBCUTANEOUS at 18:15

## 2018-10-14 RX ADMIN — CEFEPIME 100 MILLIGRAM(S): 1 INJECTION, POWDER, FOR SOLUTION INTRAMUSCULAR; INTRAVENOUS at 05:40

## 2018-10-14 RX ADMIN — Medication 1 TABLET(S): at 12:04

## 2018-10-14 RX ADMIN — Medication 10 MILLIGRAM(S): at 16:32

## 2018-10-14 NOTE — PROGRESS NOTE ADULT - SUBJECTIVE AND OBJECTIVE BOX
Diagnosis: Relapsed ALL Ph (+)     Protocol/Chemo Regimen: Inotuzamab Day 1,8 and 15    Day: 7    Pt endorsed: Headache stable with pain medications    Review of Systems: Patient denies dizziness, visual changes, chest pain, palpitations, SOB, abdominal pain, vomiting, diarrhea or dysuria.     Pain scale: denies now     Diet: Regular    Allergies    No Known Drug Allergies  shellfish (Nausea; Vomiting)    Intolerances        ANTIMICROBIALS  cefepime   IVPB      cefepime   IVPB 2000 milliGRAM(s) IV Intermittent every 8 hours  micafungin IVPB 100 milliGRAM(s) IV Intermittent daily      HEME/ONC MEDICATIONS      STANDING MEDICATIONS  allopurinol 300 milliGRAM(s) Oral daily  influenza   Vaccine 0.5 milliLiter(s) IntraMuscular once  levETIRAcetam 500 milliGRAM(s) Oral two times a day  multivitamin 1 Tablet(s) Oral daily  pantoprazole    Tablet 40 milliGRAM(s) Oral before breakfast  sodium chloride 0.9% lock flush 20 milliLiter(s) IV Push once  sodium chloride 0.9%. 1000 milliLiter(s) IV Continuous <Continuous>      PRN MEDICATIONS  acetaminophen   Tablet .. 650 milliGRAM(s) Oral every 6 hours PRN  acetaminophen   Tablet .. 650 milliGRAM(s) Oral every 6 hours PRN  HYDROmorphone  Injectable 1 milliGRAM(s) IV Push every 4 hours PRN  LORazepam   Injectable 0.5 milliGRAM(s) IV Push every 6 hours PRN  metoclopramide Injectable 10 milliGRAM(s) IV Push every 6 hours PRN  sodium chloride 0.9% lock flush 10 milliLiter(s) IV Push every 1 hour PRN  sodium chloride 0.9% lock flush 10 milliLiter(s) IV Push every 12 hours PRN        Vital Signs Last 24 Hrs  T(C): 37.3 (14 Oct 2018 05:11), Max: 37.7 (13 Oct 2018 21:24)  T(F): 99.2 (14 Oct 2018 05:11), Max: 99.9 (13 Oct 2018 21:24)  HR: 86 (14 Oct 2018 05:11) (79 - 91)  BP: 125/81 (14 Oct 2018 05:11) (119/83 - 149/91)  BP(mean): --  RR: 18 (14 Oct 2018 05:11) (18 - 18)  SpO2: 98% (14 Oct 2018 05:11) (98% - 100%)    PHYSICAL EXAM  General: NAD  HEENT: PERRLA, EOMI, clear oropharynx, anicteric sclera, pink conjunctiva  Neck: supple  CV: (+) S1/S2 RRR  Lungs: clear to auscultation, no wheezes or rales  Abdomen: soft, non-tender, non-distended (+) BS  Ext: no clubbing, cyanosis or edema  Skin: no rashes and no petechiae  Neuro: alert and oriented X 3, no focal deficits  Central Line: C/D/I    RECENT CULTURES:  10-13 @ 02:31  .Blood Blood-Peripheral  --  --  --    No growth to date.  --  10-09 @ 04:59  .Blood Blood-Catheter  --  --  --    No growth at 5 days.  --        LABS:                        7.4    0.7   )-----------( 13       ( 13 Oct 2018 08:42 )             21.6         Mean Cell Volume : 96.0 fl  Mean Cell Hemoglobin : 33.0 pg  Mean Cell Hemoglobin Concentration : 34.4 gm/dL  Auto Neutrophil # : See Note  Auto Lymphocyte # : See Note  Auto Monocyte # : See Note  Auto Eosinophil # : 0.0 K/uL  Auto Basophil # : 0.0 K/uL  Auto Neutrophil % : 0.0 %  Auto Lymphocyte % : 80.0 %  Auto Monocyte % : 0.0 %  Auto Eosinophil % : 0.0 %  Auto Basophil % : 0.0 %      10-13    139  |  104  |  8   ----------------------------<  115<H>  3.6   |  24  |  0.73    Ca    8.7      13 Oct 2018 08:41  Phos  3.1     10-13  Mg     2.0     10-13    TPro  5.7<L>  /  Alb  3.2<L>  /  TBili  0.6  /  DBili  x   /  AST  26  /  ALT  34  /  AlkPhos  161<H>  10-13      Mg 2.0  Phos 3.1            Uric Acid 2.8        RADIOLOGY & ADDITIONAL STUDIES:

## 2018-10-14 NOTE — PROGRESS NOTE ADULT - ASSESSMENT
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 Now being treated with Inatuzumab day 1, 8, 15. The patients hospital course has been complicated by SDH, transaminitis and upper GIB.

## 2018-10-14 NOTE — PROGRESS NOTE ADULT - PROBLEM SELECTOR PLAN 1
10/5 Peripheral flow confirms relapse with BCR/ABL rearrangement in 79.5% of cells  Continue Inotuzamab (Day 1, 8 and 15)  Monitor CBC/Lytes and transfuse/replete PRN  Thrombocytopenia with SDH: 1 bag PLT with repeat level after. Per blood bank patient to receive 1/2 bags over 3 hours q 12 hours. HLA to be given once available  Strict Is and Os/Daily weights/Mouth Care  Allopurinol 300mg PO daily  Antiemetics  IVF 10/5 Peripheral flow confirms relapse with BCR/ABL rearrangement in 79.5% of cells  Continue Inotuzamab (Day 1, 8 and 15)  Monitor CBC/Lytes and transfuse/replete PRN  Thrombocytopenia with SDH: 1 bag PLT with repeat level after. Per blood bank patient to receive 1/2 bags over 3 hours q 12 hours. HLA to be given once available  Strict Is and Os/Daily weights/Mouth Care  Allopurinol 300mg PO daily  Antiemetics  IVF  anemia-replace PRBC  Check post plt count to see if patient responds

## 2018-10-14 NOTE — PROGRESS NOTE ADULT - PROBLEM SELECTOR PLAN 2
Pt is neutropenic, afebrile  Continue Cefepime and Posaconazole   If febrile Pan Cx and CXR  10/5 CT A&P revealing cholelithiases, no source of infection  10/4 QuantiFeron gold (-) Pt is neutropenic, afebrile  Continue Cefepime and Posaconazole   If febrile Pan Cx and CXR  10/5 CT A&P revealing cholelithiases, no source of infection  10/4 QuantiFeron gold (-)  BC (-) 10/13

## 2018-10-14 NOTE — PROGRESS NOTE ADULT - ATTENDING COMMENTS
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 FISH and PCR negative. s/p Masha-Auto on 12/16/16. Pt was on sprycel maintenance. 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  Now getting Inotuzumab C1 day 6    -febrile Tmax to 101.5. f/u Cx's, on Cefepime/Posaconazole. Will change Posaconazole to Micafungin given less hepatoxicity with the latter -and pt is s/p Inotuz, which can cause liver toxicity  - cont to keep plt ct >80K, keep hb >7  - cont keppra px.  Repeat head CT stable 10/11.  Need to touch base with neurosurgery regarding when can patient undergo LP.  - GI bleed has resolved, continue po protonix.  - OOB, ambulation. 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 FISH and PCR negative. s/p Masha-Auto on 12/16/16. Pt was on sprycel maintenance. 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  Now getting Inotuzumab C1 day 7    -afebrile. BCx's done 10/13 when febrile were negative. Cont Cefepime and was changed to Micafungin given less hepatoxicity than with Posi -and pt is s/p Inotuz, which can cause liver toxicity  - cont to keep plt ct >80K, keep hb >7. Will repeat CBC after plt transfusion to assess if pt is platelet refractory  - cont keppra px.  Repeat head CT stable 10/11.  Need to touch base with neurosurgery regarding when can patient undergo LP.  - GI bleed has resolved, continue po protonix.  - OOB, ambulation.

## 2018-10-15 LAB
ALBUMIN SERPL ELPH-MCNC: 3.3 G/DL — SIGNIFICANT CHANGE UP (ref 3.3–5)
ALP SERPL-CCNC: 144 U/L — HIGH (ref 40–120)
ALT FLD-CCNC: 28 U/L — SIGNIFICANT CHANGE UP (ref 10–45)
ANION GAP SERPL CALC-SCNC: 6 MMOL/L — SIGNIFICANT CHANGE UP (ref 5–17)
APTT BLD: 29.8 SEC — SIGNIFICANT CHANGE UP (ref 27.5–37.4)
AST SERPL-CCNC: 21 U/L — SIGNIFICANT CHANGE UP (ref 10–40)
BILIRUB SERPL-MCNC: 0.5 MG/DL — SIGNIFICANT CHANGE UP (ref 0.2–1.2)
BLD GP AB SCN SERPL QL: NEGATIVE — SIGNIFICANT CHANGE UP
BUN SERPL-MCNC: 10 MG/DL — SIGNIFICANT CHANGE UP (ref 7–23)
CALCIUM SERPL-MCNC: 8.7 MG/DL — SIGNIFICANT CHANGE UP (ref 8.4–10.5)
CHLORIDE SERPL-SCNC: 105 MMOL/L — SIGNIFICANT CHANGE UP (ref 96–108)
CO2 SERPL-SCNC: 27 MMOL/L — SIGNIFICANT CHANGE UP (ref 22–31)
CREAT SERPL-MCNC: 0.75 MG/DL — SIGNIFICANT CHANGE UP (ref 0.5–1.3)
FIBRINOGEN PPP-MCNC: 474 MG/DL — SIGNIFICANT CHANGE UP (ref 310–510)
GLUCOSE SERPL-MCNC: 103 MG/DL — HIGH (ref 70–99)
HCT VFR BLD CALC: 22.3 % — LOW (ref 34.5–45)
HGB BLD-MCNC: 7.5 G/DL — LOW (ref 11.5–15.5)
INR BLD: 0.99 RATIO — SIGNIFICANT CHANGE UP (ref 0.88–1.16)
LDH SERPL L TO P-CCNC: 293 U/L — HIGH (ref 50–242)
MAGNESIUM SERPL-MCNC: 2 MG/DL — SIGNIFICANT CHANGE UP (ref 1.6–2.6)
MCHC RBC-ENTMCNC: 31 PG — SIGNIFICANT CHANGE UP (ref 27–34)
MCHC RBC-ENTMCNC: 33.6 GM/DL — SIGNIFICANT CHANGE UP (ref 32–36)
MCV RBC AUTO: 92.2 FL — SIGNIFICANT CHANGE UP (ref 80–100)
PHOSPHATE SERPL-MCNC: 2.5 MG/DL — SIGNIFICANT CHANGE UP (ref 2.5–4.5)
PLATELET # BLD AUTO: 9 K/UL — CRITICAL LOW (ref 150–400)
POTASSIUM SERPL-MCNC: 3.9 MMOL/L — SIGNIFICANT CHANGE UP (ref 3.5–5.3)
POTASSIUM SERPL-SCNC: 3.9 MMOL/L — SIGNIFICANT CHANGE UP (ref 3.5–5.3)
PROT SERPL-MCNC: 5.8 G/DL — LOW (ref 6–8.3)
PROTHROM AB SERPL-ACNC: 10.8 SEC — SIGNIFICANT CHANGE UP (ref 9.8–12.7)
RBC # BLD: 2.42 M/UL — LOW (ref 3.8–5.2)
RBC # FLD: 15.7 % — HIGH (ref 10.3–14.5)
RH IG SCN BLD-IMP: POSITIVE — SIGNIFICANT CHANGE UP
SODIUM SERPL-SCNC: 138 MMOL/L — SIGNIFICANT CHANGE UP (ref 135–145)
URATE SERPL-MCNC: 2.1 MG/DL — LOW (ref 2.5–7)
WBC # BLD: 0.5 K/UL — CRITICAL LOW (ref 3.8–10.5)
WBC # FLD AUTO: 0.5 K/UL — CRITICAL LOW (ref 3.8–10.5)

## 2018-10-15 PROCEDURE — 99233 SBSQ HOSP IP/OBS HIGH 50: CPT

## 2018-10-15 RX ORDER — DIPHENHYDRAMINE HCL 50 MG
50 CAPSULE ORAL ONCE
Qty: 0 | Refills: 0 | Status: COMPLETED | OUTPATIENT
Start: 2018-10-15 | End: 2018-10-15

## 2018-10-15 RX ORDER — HYDROCORTISONE 20 MG
100 TABLET ORAL ONCE
Qty: 0 | Refills: 0 | Status: COMPLETED | OUTPATIENT
Start: 2018-10-15 | End: 2018-10-15

## 2018-10-15 RX ORDER — HYDROMORPHONE HYDROCHLORIDE 2 MG/ML
2 INJECTION INTRAMUSCULAR; INTRAVENOUS; SUBCUTANEOUS ONCE
Qty: 0 | Refills: 0 | Status: DISCONTINUED | OUTPATIENT
Start: 2018-10-15 | End: 2018-10-15

## 2018-10-15 RX ORDER — INOTUZUMAB OZOGAMICIN 0.25 MG/ML
1.08 INJECTION, POWDER, LYOPHILIZED, FOR SOLUTION INTRAVENOUS ONCE
Qty: 0 | Refills: 0 | Status: COMPLETED | OUTPATIENT
Start: 2018-10-15 | End: 2018-10-15

## 2018-10-15 RX ORDER — HYDROMORPHONE HYDROCHLORIDE 2 MG/ML
0.5 INJECTION INTRAMUSCULAR; INTRAVENOUS; SUBCUTANEOUS EVERY 6 HOURS
Qty: 0 | Refills: 0 | Status: DISCONTINUED | OUTPATIENT
Start: 2018-10-15 | End: 2018-10-15

## 2018-10-15 RX ORDER — HYDROMORPHONE HYDROCHLORIDE 2 MG/ML
0.5 INJECTION INTRAMUSCULAR; INTRAVENOUS; SUBCUTANEOUS
Qty: 0 | Refills: 0 | Status: DISCONTINUED | OUTPATIENT
Start: 2018-10-15 | End: 2018-10-19

## 2018-10-15 RX ORDER — ACETAMINOPHEN 500 MG
650 TABLET ORAL ONCE
Qty: 0 | Refills: 0 | Status: COMPLETED | OUTPATIENT
Start: 2018-10-15 | End: 2018-10-15

## 2018-10-15 RX ORDER — HYDROMORPHONE HYDROCHLORIDE 2 MG/ML
1 INJECTION INTRAMUSCULAR; INTRAVENOUS; SUBCUTANEOUS EVERY 4 HOURS
Qty: 0 | Refills: 0 | Status: DISCONTINUED | OUTPATIENT
Start: 2018-10-15 | End: 2018-10-19

## 2018-10-15 RX ORDER — HYDROMORPHONE HYDROCHLORIDE 2 MG/ML
1 INJECTION INTRAMUSCULAR; INTRAVENOUS; SUBCUTANEOUS EVERY 4 HOURS
Qty: 0 | Refills: 0 | Status: DISCONTINUED | OUTPATIENT
Start: 2018-10-15 | End: 2018-10-15

## 2018-10-15 RX ADMIN — HYDROMORPHONE HYDROCHLORIDE 2 MILLIGRAM(S): 2 INJECTION INTRAMUSCULAR; INTRAVENOUS; SUBCUTANEOUS at 14:51

## 2018-10-15 RX ADMIN — SODIUM CHLORIDE 50 MILLILITER(S): 9 INJECTION INTRAMUSCULAR; INTRAVENOUS; SUBCUTANEOUS at 21:30

## 2018-10-15 RX ADMIN — LEVETIRACETAM 500 MILLIGRAM(S): 250 TABLET, FILM COATED ORAL at 17:39

## 2018-10-15 RX ADMIN — CEFEPIME 100 MILLIGRAM(S): 1 INJECTION, POWDER, FOR SOLUTION INTRAMUSCULAR; INTRAVENOUS at 14:34

## 2018-10-15 RX ADMIN — HYDROMORPHONE HYDROCHLORIDE 1 MILLIGRAM(S): 2 INJECTION INTRAMUSCULAR; INTRAVENOUS; SUBCUTANEOUS at 14:35

## 2018-10-15 RX ADMIN — Medication 650 MILLIGRAM(S): at 16:20

## 2018-10-15 RX ADMIN — HYDROMORPHONE HYDROCHLORIDE 1 MILLIGRAM(S): 2 INJECTION INTRAMUSCULAR; INTRAVENOUS; SUBCUTANEOUS at 06:55

## 2018-10-15 RX ADMIN — PANTOPRAZOLE SODIUM 40 MILLIGRAM(S): 20 TABLET, DELAYED RELEASE ORAL at 05:00

## 2018-10-15 RX ADMIN — Medication 1 TABLET(S): at 12:00

## 2018-10-15 RX ADMIN — HYDROMORPHONE HYDROCHLORIDE 0.5 MILLIGRAM(S): 2 INJECTION INTRAMUSCULAR; INTRAVENOUS; SUBCUTANEOUS at 19:49

## 2018-10-15 RX ADMIN — HYDROMORPHONE HYDROCHLORIDE 2 MILLIGRAM(S): 2 INJECTION INTRAMUSCULAR; INTRAVENOUS; SUBCUTANEOUS at 16:20

## 2018-10-15 RX ADMIN — CEFEPIME 100 MILLIGRAM(S): 1 INJECTION, POWDER, FOR SOLUTION INTRAMUSCULAR; INTRAVENOUS at 21:30

## 2018-10-15 RX ADMIN — LEVETIRACETAM 500 MILLIGRAM(S): 250 TABLET, FILM COATED ORAL at 05:00

## 2018-10-15 RX ADMIN — INOTUZUMAB OZOGAMICIN 50 MILLIGRAM(S): 0.25 INJECTION, POWDER, LYOPHILIZED, FOR SOLUTION INTRAVENOUS at 15:49

## 2018-10-15 RX ADMIN — Medication 50 MILLIGRAM(S): at 14:25

## 2018-10-15 RX ADMIN — Medication 300 MILLIGRAM(S): at 12:00

## 2018-10-15 RX ADMIN — Medication 100 MILLIGRAM(S): at 14:26

## 2018-10-15 RX ADMIN — HYDROMORPHONE HYDROCHLORIDE 0.5 MILLIGRAM(S): 2 INJECTION INTRAMUSCULAR; INTRAVENOUS; SUBCUTANEOUS at 20:10

## 2018-10-15 RX ADMIN — CEFEPIME 100 MILLIGRAM(S): 1 INJECTION, POWDER, FOR SOLUTION INTRAMUSCULAR; INTRAVENOUS at 05:00

## 2018-10-15 RX ADMIN — HYDROMORPHONE HYDROCHLORIDE 1 MILLIGRAM(S): 2 INJECTION INTRAMUSCULAR; INTRAVENOUS; SUBCUTANEOUS at 06:25

## 2018-10-15 RX ADMIN — HYDROMORPHONE HYDROCHLORIDE 1 MILLIGRAM(S): 2 INJECTION INTRAMUSCULAR; INTRAVENOUS; SUBCUTANEOUS at 02:42

## 2018-10-15 RX ADMIN — HYDROMORPHONE HYDROCHLORIDE 1 MILLIGRAM(S): 2 INJECTION INTRAMUSCULAR; INTRAVENOUS; SUBCUTANEOUS at 10:27

## 2018-10-15 RX ADMIN — Medication 650 MILLIGRAM(S): at 14:25

## 2018-10-15 RX ADMIN — SODIUM CHLORIDE 50 MILLILITER(S): 9 INJECTION INTRAMUSCULAR; INTRAVENOUS; SUBCUTANEOUS at 15:41

## 2018-10-15 RX ADMIN — MICAFUNGIN SODIUM 105 MILLIGRAM(S): 100 INJECTION, POWDER, LYOPHILIZED, FOR SOLUTION INTRAVENOUS at 12:00

## 2018-10-15 RX ADMIN — HYDROMORPHONE HYDROCHLORIDE 1 MILLIGRAM(S): 2 INJECTION INTRAMUSCULAR; INTRAVENOUS; SUBCUTANEOUS at 02:07

## 2018-10-15 NOTE — ADVANCED PRACTICE NURSE CONSULT - ASSESSMENT
Arrived to find patient in bed, awake and oriented times four. Patient s/p Right double lumen right arm PICC. PICC line previously accessed, remain with + blood return and flushes easily, dressing dry and intact, no signs of bleeding or swelling noted. Chemotherapy teaching done, patient verbalized understanding, also given drug card which outlines side effects. Lab values were reviewed by Dr. Logan during rounds. Patient was pre-medicated with Tylenol 650mg PO, Benadryl 50mg IV, Hydrocortisone 100mg IV. One hour after pre-medications patient was given Inotuzumab 0.5mg/m2=1.08mg IV as ordered. Patient was monitored during infusion, no reactions noted. Left patient in bed sleeping, report given to primary nurse.

## 2018-10-15 NOTE — PROGRESS NOTE ADULT - SUBJECTIVE AND OBJECTIVE BOX
Diagnosis: Relapsed ALL Ph (+)     Protocol/Chemo Regimen: Inotuzamab Day 1,8 and 15    Day: 8    Pt endorsed: Headache stable with pain medications    Review of Systems: Patient denies dizziness, visual changes, chest pain, palpitations, SOB, abdominal pain, vomiting, diarrhea or dysuria.     Pain scale: denies now     Diet: Regular    Allergies    No Known Drug Allergies  shellfish (Nausea; Vomiting)    Intolerances        ANTIMICROBIALS  cefepime   IVPB      cefepime   IVPB 2000 milliGRAM(s) IV Intermittent every 8 hours  micafungin IVPB 100 milliGRAM(s) IV Intermittent daily      HEME/ONC MEDICATIONS  inotuzumab ozogamicin IVPB (eMAR) 1.08 milliGRAM(s) IV Intermittent once      STANDING MEDICATIONS  acetaminophen   Tablet .. 650 milliGRAM(s) Oral once  allopurinol 300 milliGRAM(s) Oral daily  diphenhydrAMINE   Injectable 50 milliGRAM(s) IV Push once  hydrocortisone sodium succinate Injectable 100 milliGRAM(s) IV Push once  influenza   Vaccine 0.5 milliLiter(s) IntraMuscular once  levETIRAcetam 500 milliGRAM(s) Oral two times a day  multivitamin 1 Tablet(s) Oral daily  pantoprazole    Tablet 40 milliGRAM(s) Oral before breakfast  sodium chloride 0.9% lock flush 20 milliLiter(s) IV Push once  sodium chloride 0.9%. 1000 milliLiter(s) IV Continuous <Continuous>      PRN MEDICATIONS  acetaminophen   Tablet .. 650 milliGRAM(s) Oral every 6 hours PRN  acetaminophen   Tablet .. 650 milliGRAM(s) Oral every 6 hours PRN  HYDROmorphone  Injectable 1 milliGRAM(s) IV Push every 4 hours PRN  metoclopramide Injectable 10 milliGRAM(s) IV Push every 6 hours PRN  sodium chloride 0.9% lock flush 10 milliLiter(s) IV Push every 1 hour PRN  sodium chloride 0.9% lock flush 10 milliLiter(s) IV Push every 12 hours PRN        Vital Signs Last 24 Hrs  T(C): 37.3 (15 Oct 2018 09:18), Max: 38.4 (14 Oct 2018 18:20)  T(F): 99.2 (15 Oct 2018 09:18), Max: 101.1 (14 Oct 2018 18:20)  HR: 82 (15 Oct 2018 09:18) (82 - 100)  BP: 149/85 (15 Oct 2018 09:18) (121/84 - 153/83)  BP(mean): --  RR: 18 (15 Oct 2018 09:18) (18 - 18)  SpO2: 99% (15 Oct 2018 09:18) (97% - 100%)    PHYSICAL EXAM  General: NAD  HEENT:  clear oropharynx, anicteric sclera  Neck: supple  CV: (+) S1/S2 RRR  Lungs: clear to auscultation, no wheezes or rales  Abdomen: soft, non-tender, non-distended (+) BS x 4Q  Ext: no edema  Skin: no rashes  Neuro: alert and oriented X 3  Central Line:           LABS:                        7.5    0.5   )-----------( 9        ( 15 Oct 2018 06:59 )             22.3         Mean Cell Volume : 92.2 fl  Mean Cell Hemoglobin : 31.0 pg  Mean Cell Hemoglobin Concentration : 33.6 gm/dL  Auto Neutrophil # : x  Auto Lymphocyte # : x  Auto Monocyte # : x  Auto Eosinophil # : x  Auto Basophil # : x  Auto Neutrophil % : x  Auto Lymphocyte % : x  Auto Monocyte % : x  Auto Eosinophil % : x  Auto Basophil % : x      10-15    138  |  105  |  10  ----------------------------<  103<H>  3.9   |  27  |  0.75    Ca    8.7      15 Oct 2018 06:50  Phos  2.5     10-15  Mg     2.0     10-15    TPro  5.8<L>  /  Alb  3.3  /  TBili  0.5  /  DBili  x   /  AST  21  /  ALT  28  /  AlkPhos  144<H>  10-15      Mg 2.0  Phos 2.5      PT/INR - ( 15 Oct 2018 06:59 )   PT: 10.8 sec;   INR: 0.99 ratio         PTT - ( 15 Oct 2018 06:59 )  PTT:29.8 sec      Uric Acid 2.1        RECENT CULTURES:  10-13 @ 03:01  .Urine Clean Catch (Midstream)      No growth  --  10-13 @ 02:31  .Blood Blood-Peripheral      No growth to date.  --  10-09 @ 04:59  .Blood Blood-Catheter      No growth at 5 days.  --      RADIOLOGY & ADDITIONAL STUDIES:    CT Head No Cont (10.11.18 @ 09:20) >  IMPRESSION:     Small residual hypodense left frontoparietal subdural collection, without   significant change in size.   Unchanged posterior left parafalcine and left tentorial subdural hematoma.  No new areas of acute intracranial hemorrhage.  < from: Xray Chest 1 View- PORTABLE-Urgent (10.09.18 @ 00:59) >  Impression:    Low lung volumes without consolidations or edema. No pneumothorax    Right PICC line in expected location Diagnosis: Relapsed ALL Ph (+)     Protocol/Chemo Regimen: Inotuzamab Day 1,8 and 15    Day: 8    Pt endorsed: Headache stable with pain medications    Review of Systems: Patient denies dizziness, visual changes, chest pain, palpitations, SOB, abdominal pain, vomiting, diarrhea or dysuria.     Pain scale: denies now     Diet: Regular    Allergies    No Known Drug Allergies  shellfish (Nausea; Vomiting)    Intolerances        ANTIMICROBIALS  cefepime   IVPB      cefepime   IVPB 2000 milliGRAM(s) IV Intermittent every 8 hours  micafungin IVPB 100 milliGRAM(s) IV Intermittent daily      HEME/ONC MEDICATIONS  inotuzumab ozogamicin IVPB (eMAR) 1.08 milliGRAM(s) IV Intermittent once      STANDING MEDICATIONS  acetaminophen   Tablet .. 650 milliGRAM(s) Oral once  allopurinol 300 milliGRAM(s) Oral daily  diphenhydrAMINE   Injectable 50 milliGRAM(s) IV Push once  hydrocortisone sodium succinate Injectable 100 milliGRAM(s) IV Push once  influenza   Vaccine 0.5 milliLiter(s) IntraMuscular once  levETIRAcetam 500 milliGRAM(s) Oral two times a day  multivitamin 1 Tablet(s) Oral daily  pantoprazole    Tablet 40 milliGRAM(s) Oral before breakfast  sodium chloride 0.9% lock flush 20 milliLiter(s) IV Push once  sodium chloride 0.9%. 1000 milliLiter(s) IV Continuous <Continuous>      PRN MEDICATIONS  acetaminophen   Tablet .. 650 milliGRAM(s) Oral every 6 hours PRN  acetaminophen   Tablet .. 650 milliGRAM(s) Oral every 6 hours PRN  HYDROmorphone  Injectable 1 milliGRAM(s) IV Push every 4 hours PRN  metoclopramide Injectable 10 milliGRAM(s) IV Push every 6 hours PRN  sodium chloride 0.9% lock flush 10 milliLiter(s) IV Push every 1 hour PRN  sodium chloride 0.9% lock flush 10 milliLiter(s) IV Push every 12 hours PRN        Vital Signs Last 24 Hrs  T(C): 37.3 (15 Oct 2018 09:18), Max: 38.4 (14 Oct 2018 18:20)  T(F): 99.2 (15 Oct 2018 09:18), Max: 101.1 (14 Oct 2018 18:20)  HR: 82 (15 Oct 2018 09:18) (82 - 100)  BP: 149/85 (15 Oct 2018 09:18) (121/84 - 153/83)  BP(mean): --  RR: 18 (15 Oct 2018 09:18) (18 - 18)  SpO2: 99% (15 Oct 2018 09:18) (97% - 100%)    PHYSICAL EXAM  General: NAD  HEENT:  clear oropharynx, anicteric sclera, right side of head -Ommaya in place    Neck: supple  CV: (+) S1/S2 RRR  Lungs: clear to auscultation, no wheezes or rales  Abdomen: soft, non-tender, non-distended (+) BS x 4Q  Ext: no edema  Skin: no rashes  Neuro: alert and oriented X 3  Central Line: PICC CDI           LABS:                        7.5    0.5   )-----------( 9        ( 15 Oct 2018 06:59 )             22.3         Mean Cell Volume : 92.2 fl  Mean Cell Hemoglobin : 31.0 pg  Mean Cell Hemoglobin Concentration : 33.6 gm/dL  Auto Neutrophil # : x  Auto Lymphocyte # : x  Auto Monocyte # : x  Auto Eosinophil # : x  Auto Basophil # : x  Auto Neutrophil % : x  Auto Lymphocyte % : x  Auto Monocyte % : x  Auto Eosinophil % : x  Auto Basophil % : x      10-15    138  |  105  |  10  ----------------------------<  103<H>  3.9   |  27  |  0.75    Ca    8.7      15 Oct 2018 06:50  Phos  2.5     10-15  Mg     2.0     10-15    TPro  5.8<L>  /  Alb  3.3  /  TBili  0.5  /  DBili  x   /  AST  21  /  ALT  28  /  AlkPhos  144<H>  10-15      Mg 2.0  Phos 2.5      PT/INR - ( 15 Oct 2018 06:59 )   PT: 10.8 sec;   INR: 0.99 ratio         PTT - ( 15 Oct 2018 06:59 )  PTT:29.8 sec      Uric Acid 2.1        RECENT CULTURES:  10-13 @ 03:01  .Urine Clean Catch (Midstream)      No growth  --  10-13 @ 02:31  .Blood Blood-Peripheral      No growth to date.  --  10-09 @ 04:59  .Blood Blood-Catheter      No growth at 5 days.  --      RADIOLOGY & ADDITIONAL STUDIES:    CT Head No Cont (10.11.18 @ 09:20)   IMPRESSION:     Small residual hypodense left frontoparietal subdural collection, without   significant change in size.   Unchanged posterior left parafalcine and left tentorial subdural hematoma.  No new areas of acute intracranial hemorrhage.   from: Xray Chest 1 View- PORTABLE-Urgent (10.09.18 @ 00:59) >  Impression:    Low lung volumes without consolidations or edema. No pneumothorax    Right PICC line in expected location

## 2018-10-15 NOTE — PROGRESS NOTE ADULT - PROBLEM SELECTOR PLAN 2
Pt is neutropenic, afebrile  Continue Cefepime and Posaconazole   If febrile Pan Cx and CXR  10/5 CT A&P revealing cholelithiases, no source of infection  10/4 QuantiFeron gold (-)  BC (-) 10/13 Pt is neutropenic, febrile  Continue Cefepime and Posaconazole   If febrile Pan Cx, add vanco and CT C/A/P   Cultures NGT   10/5 CT A&P revealing cholelithiases, no source of infection  10/4 QuantiFeron gold (-)

## 2018-10-15 NOTE — PROGRESS NOTE ADULT - PROBLEM SELECTOR PLAN 3
10/4 CT Head revealed small bilateral acute on chronic convexity subdural hematomas. Repeat CT Head on 10/5, 10/6 and 10/11 is stable   10/4 Given DDAVP   Continue Keppra 500mg BID for seizure ppx   Keep PLT >80K  Neuro checks q4 hrs  Pain management   Neurosurgery following, no intervention at this time. Contacted service today to evaluate when Omaya can be used, fellow to speak with Dr. Sellers and notify team of time frame 10/4 CT Head revealed small bilateral acute on chronic convexity subdural hematomas. Repeat CT Head on 10/5, 10/6 and 10/11 is stable   10/4 Given DDAVP   Continue Keppra 500mg BID for seizure ppx   Keep PLT >80K  Neuro checks q4 hrs  Pain management   Neurosurgery following, no intervention at this time. Continue to keep platelet goal above 80 now per neuro surgery

## 2018-10-15 NOTE — PROGRESS NOTE ADULT - ATTENDING COMMENTS
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 FISH and PCR negative. s/p Masha-Auto on 12/16/16. Pt was on sprycel maintenance. 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  Now getting Inotuzumab C1 day 7    -afebrile. BCx's done 10/13 when febrile were negative. Cont Cefepime and was changed to Micafungin given less hepatoxicity than with Posi -and pt is s/p Inotuz, which can cause liver toxicity  - cont to keep plt ct >80K, keep hb >7. Will repeat CBC after plt transfusion to assess if pt is platelet refractory  - cont keppra px.  Repeat head CT stable 10/11.  Need to touch base with neurosurgery regarding when can patient undergo LP.  - GI bleed has resolved, continue po protonix.  - OOB, ambulation. 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 FISH and PCR negative. s/p Masha-Auto on 12/16/16. Pt was on sprycel maintenance. 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  Now getting Inotuzumab C1 day 8- dose today.    -Febrile. BCx/UCx negative 10/13. Cont Cefepime/mycamine.  If she continues to spike, will add vancomycin and plan for a CT chest/abd/pelvis.    - cont to keep plt ct >80K, keep hb >7. Will repeat CBC after plt transfusion to assess if pt is platelet refractory  - cont keppra px.  Repeat head CT stable 10/11.  Repeat head CT- f/u neurosurgery regarding plt threshold and when can we tap her ommaya.    - GI bleed has resolved, continue po protonix.  - OOB, ambulation.

## 2018-10-15 NOTE — PROGRESS NOTE ADULT - ASSESSMENT
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 Now being treated with Inatuzumab day 1, 8, 15. The patients hospital course has been complicated by SDH, transaminitis and upper GIB. 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 Now being treated with Inatuzumab day 1, 8, 15. The patients hospital course has been complicated by SDH, neutropenic fevers , transaminitis and upper GIB.

## 2018-10-15 NOTE — PROGRESS NOTE ADULT - PROBLEM SELECTOR PLAN 1
10/5 Peripheral flow confirms relapse with BCR/ABL rearrangement in 79.5% of cells  Continue Inotuzamab (Day 1, 8 and 15)  Monitor CBC/Lytes and transfuse/replete PRN  Thrombocytopenia with SDH: 1 bag PLT with repeat level after. Per blood bank patient to receive 1/2 bags over 3 hours q 12 hours. HLA to be given once available  Strict Is and Os/Daily weights/Mouth Care  Allopurinol 300mg PO daily  Antiemetics  IVF  anemia-replace PRBC  Check post plt count to see if patient responds 10/5 Peripheral flow confirms relapse with BCR/ABL rearrangement in 79.5% of cells  Continue Inotuzamab (Day 1, 8 and 15)  Monitor CBC/Lytes and transfuse/replete PRN  Thrombocytopenia with SDH: . Per blood bank patient to receive 1/2 bags over 3 hours q 12 hours. HLA to be given once available  Strict Is and Os/Daily weights/Mouth Care  Allopurinol 300mg PO daily  Antiemetics  IVF

## 2018-10-16 LAB
ALBUMIN SERPL ELPH-MCNC: 3.4 G/DL — SIGNIFICANT CHANGE UP (ref 3.3–5)
ALP SERPL-CCNC: 207 U/L — HIGH (ref 40–120)
ALT FLD-CCNC: 51 U/L — HIGH (ref 10–45)
ANION GAP SERPL CALC-SCNC: 12 MMOL/L — SIGNIFICANT CHANGE UP (ref 5–17)
APPEARANCE UR: CLEAR — SIGNIFICANT CHANGE UP
AST SERPL-CCNC: 50 U/L — HIGH (ref 10–40)
BACTERIA # UR AUTO: NEGATIVE — SIGNIFICANT CHANGE UP
BILIRUB SERPL-MCNC: 0.4 MG/DL — SIGNIFICANT CHANGE UP (ref 0.2–1.2)
BILIRUB UR-MCNC: NEGATIVE — SIGNIFICANT CHANGE UP
BUN SERPL-MCNC: 15 MG/DL — SIGNIFICANT CHANGE UP (ref 7–23)
CALCIUM SERPL-MCNC: 9.1 MG/DL — SIGNIFICANT CHANGE UP (ref 8.4–10.5)
CHLORIDE SERPL-SCNC: 105 MMOL/L — SIGNIFICANT CHANGE UP (ref 96–108)
CO2 SERPL-SCNC: 22 MMOL/L — SIGNIFICANT CHANGE UP (ref 22–31)
COLOR SPEC: YELLOW — SIGNIFICANT CHANGE UP
CREAT SERPL-MCNC: 0.72 MG/DL — SIGNIFICANT CHANGE UP (ref 0.5–1.3)
DIFF PNL FLD: ABNORMAL
EPI CELLS # UR: 2 /HPF — SIGNIFICANT CHANGE UP (ref 0–5)
GLUCOSE SERPL-MCNC: 157 MG/DL — HIGH (ref 70–99)
GLUCOSE UR QL: NEGATIVE MG/DL — SIGNIFICANT CHANGE UP
HCT VFR BLD CALC: 21.1 % — LOW (ref 34.5–45)
HGB BLD-MCNC: 7.5 G/DL — LOW (ref 11.5–15.5)
HYALINE CASTS # UR AUTO: 4 /LPF — SIGNIFICANT CHANGE UP (ref 0–7)
KETONES UR-MCNC: NEGATIVE — SIGNIFICANT CHANGE UP
LDH SERPL L TO P-CCNC: 326 U/L — HIGH (ref 50–242)
LEUKOCYTE ESTERASE UR-ACNC: NEGATIVE — SIGNIFICANT CHANGE UP
MAGNESIUM SERPL-MCNC: 2 MG/DL — SIGNIFICANT CHANGE UP (ref 1.6–2.6)
MCHC RBC-ENTMCNC: 32.8 PG — SIGNIFICANT CHANGE UP (ref 27–34)
MCHC RBC-ENTMCNC: 35.6 GM/DL — SIGNIFICANT CHANGE UP (ref 32–36)
MCV RBC AUTO: 92.2 FL — SIGNIFICANT CHANGE UP (ref 80–100)
NITRITE UR-MCNC: NEGATIVE — SIGNIFICANT CHANGE UP
PH UR: 7 — SIGNIFICANT CHANGE UP (ref 5–8)
PHOSPHATE SERPL-MCNC: 1.7 MG/DL — LOW (ref 2.5–4.5)
PHOSPHATE SERPL-MCNC: 1.7 MG/DL — LOW (ref 2.5–4.5)
PLATELET # BLD AUTO: 2 K/UL — CRITICAL LOW (ref 150–400)
POTASSIUM SERPL-MCNC: 3.7 MMOL/L — SIGNIFICANT CHANGE UP (ref 3.5–5.3)
POTASSIUM SERPL-SCNC: 3.7 MMOL/L — SIGNIFICANT CHANGE UP (ref 3.5–5.3)
PROT SERPL-MCNC: 5.5 G/DL — LOW (ref 6–8.3)
PROT UR-MCNC: 100 MG/DL
RBC # BLD: 2.29 M/UL — LOW (ref 3.8–5.2)
RBC # FLD: 15.5 % — HIGH (ref 10.3–14.5)
RBC CASTS # UR COMP ASSIST: 18 /HPF — HIGH (ref 0–4)
SODIUM SERPL-SCNC: 139 MMOL/L — SIGNIFICANT CHANGE UP (ref 135–145)
SP GR SPEC: 1.02 — SIGNIFICANT CHANGE UP (ref 1.01–1.02)
URATE SERPL-MCNC: 1.7 MG/DL — LOW (ref 2.5–7)
UROBILINOGEN FLD QL: NEGATIVE MG/DL — SIGNIFICANT CHANGE UP
WBC # BLD: 0.2 K/UL — CRITICAL LOW (ref 3.8–10.5)
WBC # FLD AUTO: 0.2 K/UL — CRITICAL LOW (ref 3.8–10.5)
WBC UR QL: 1 /HPF — SIGNIFICANT CHANGE UP (ref 0–5)

## 2018-10-16 PROCEDURE — 99232 SBSQ HOSP IP/OBS MODERATE 35: CPT

## 2018-10-16 PROCEDURE — 74177 CT ABD & PELVIS W/CONTRAST: CPT | Mod: 26

## 2018-10-16 PROCEDURE — 71045 X-RAY EXAM CHEST 1 VIEW: CPT | Mod: 26

## 2018-10-16 PROCEDURE — 70450 CT HEAD/BRAIN W/O DYE: CPT | Mod: 26

## 2018-10-16 PROCEDURE — 71260 CT THORAX DX C+: CPT | Mod: 26

## 2018-10-16 PROCEDURE — 93010 ELECTROCARDIOGRAM REPORT: CPT

## 2018-10-16 RX ORDER — VANCOMYCIN HCL 1 G
1000 VIAL (EA) INTRAVENOUS EVERY 12 HOURS
Qty: 0 | Refills: 0 | Status: DISCONTINUED | OUTPATIENT
Start: 2018-10-16 | End: 2018-10-17

## 2018-10-16 RX ORDER — POTASSIUM PHOSPHATE, MONOBASIC POTASSIUM PHOSPHATE, DIBASIC 236; 224 MG/ML; MG/ML
15 INJECTION, SOLUTION INTRAVENOUS ONCE
Qty: 0 | Refills: 0 | Status: COMPLETED | OUTPATIENT
Start: 2018-10-16 | End: 2018-10-16

## 2018-10-16 RX ADMIN — HYDROMORPHONE HYDROCHLORIDE 0.5 MILLIGRAM(S): 2 INJECTION INTRAMUSCULAR; INTRAVENOUS; SUBCUTANEOUS at 20:02

## 2018-10-16 RX ADMIN — LEVETIRACETAM 500 MILLIGRAM(S): 250 TABLET, FILM COATED ORAL at 05:30

## 2018-10-16 RX ADMIN — Medication 300 MILLIGRAM(S): at 13:26

## 2018-10-16 RX ADMIN — HYDROMORPHONE HYDROCHLORIDE 0.5 MILLIGRAM(S): 2 INJECTION INTRAMUSCULAR; INTRAVENOUS; SUBCUTANEOUS at 09:21

## 2018-10-16 RX ADMIN — HYDROMORPHONE HYDROCHLORIDE 0.5 MILLIGRAM(S): 2 INJECTION INTRAMUSCULAR; INTRAVENOUS; SUBCUTANEOUS at 01:20

## 2018-10-16 RX ADMIN — LEVETIRACETAM 500 MILLIGRAM(S): 250 TABLET, FILM COATED ORAL at 18:20

## 2018-10-16 RX ADMIN — HYDROMORPHONE HYDROCHLORIDE 1 MILLIGRAM(S): 2 INJECTION INTRAMUSCULAR; INTRAVENOUS; SUBCUTANEOUS at 22:30

## 2018-10-16 RX ADMIN — SODIUM CHLORIDE 50 MILLILITER(S): 9 INJECTION INTRAMUSCULAR; INTRAVENOUS; SUBCUTANEOUS at 05:27

## 2018-10-16 RX ADMIN — SODIUM CHLORIDE 10 MILLILITER(S): 9 INJECTION INTRAMUSCULAR; INTRAVENOUS; SUBCUTANEOUS at 09:22

## 2018-10-16 RX ADMIN — Medication 650 MILLIGRAM(S): at 21:34

## 2018-10-16 RX ADMIN — POTASSIUM PHOSPHATE, MONOBASIC POTASSIUM PHOSPHATE, DIBASIC 62.5 MILLIMOLE(S): 236; 224 INJECTION, SOLUTION INTRAVENOUS at 09:24

## 2018-10-16 RX ADMIN — Medication 250 MILLIGRAM(S): at 05:35

## 2018-10-16 RX ADMIN — CEFEPIME 100 MILLIGRAM(S): 1 INJECTION, POWDER, FOR SOLUTION INTRAMUSCULAR; INTRAVENOUS at 16:01

## 2018-10-16 RX ADMIN — Medication 10 MILLIGRAM(S): at 11:46

## 2018-10-16 RX ADMIN — Medication 250 MILLIGRAM(S): at 18:20

## 2018-10-16 RX ADMIN — Medication 10 MILLIGRAM(S): at 03:45

## 2018-10-16 RX ADMIN — MICAFUNGIN SODIUM 105 MILLIGRAM(S): 100 INJECTION, POWDER, LYOPHILIZED, FOR SOLUTION INTRAVENOUS at 16:01

## 2018-10-16 RX ADMIN — Medication 650 MILLIGRAM(S): at 11:46

## 2018-10-16 RX ADMIN — HYDROMORPHONE HYDROCHLORIDE 1 MILLIGRAM(S): 2 INJECTION INTRAMUSCULAR; INTRAVENOUS; SUBCUTANEOUS at 13:45

## 2018-10-16 RX ADMIN — HYDROMORPHONE HYDROCHLORIDE 0.5 MILLIGRAM(S): 2 INJECTION INTRAMUSCULAR; INTRAVENOUS; SUBCUTANEOUS at 16:45

## 2018-10-16 RX ADMIN — CEFEPIME 100 MILLIGRAM(S): 1 INJECTION, POWDER, FOR SOLUTION INTRAMUSCULAR; INTRAVENOUS at 21:34

## 2018-10-16 RX ADMIN — HYDROMORPHONE HYDROCHLORIDE 0.5 MILLIGRAM(S): 2 INJECTION INTRAMUSCULAR; INTRAVENOUS; SUBCUTANEOUS at 01:01

## 2018-10-16 RX ADMIN — HYDROMORPHONE HYDROCHLORIDE 1 MILLIGRAM(S): 2 INJECTION INTRAMUSCULAR; INTRAVENOUS; SUBCUTANEOUS at 13:23

## 2018-10-16 RX ADMIN — HYDROMORPHONE HYDROCHLORIDE 0.5 MILLIGRAM(S): 2 INJECTION INTRAMUSCULAR; INTRAVENOUS; SUBCUTANEOUS at 20:32

## 2018-10-16 RX ADMIN — Medication 1 TABLET(S): at 13:26

## 2018-10-16 RX ADMIN — HYDROMORPHONE HYDROCHLORIDE 1 MILLIGRAM(S): 2 INJECTION INTRAMUSCULAR; INTRAVENOUS; SUBCUTANEOUS at 18:33

## 2018-10-16 RX ADMIN — PANTOPRAZOLE SODIUM 40 MILLIGRAM(S): 20 TABLET, DELAYED RELEASE ORAL at 05:30

## 2018-10-16 RX ADMIN — CEFEPIME 100 MILLIGRAM(S): 1 INJECTION, POWDER, FOR SOLUTION INTRAMUSCULAR; INTRAVENOUS at 05:31

## 2018-10-16 RX ADMIN — HYDROMORPHONE HYDROCHLORIDE 0.5 MILLIGRAM(S): 2 INJECTION INTRAMUSCULAR; INTRAVENOUS; SUBCUTANEOUS at 09:40

## 2018-10-16 RX ADMIN — Medication 650 MILLIGRAM(S): at 03:47

## 2018-10-16 RX ADMIN — HYDROMORPHONE HYDROCHLORIDE 0.5 MILLIGRAM(S): 2 INJECTION INTRAMUSCULAR; INTRAVENOUS; SUBCUTANEOUS at 16:24

## 2018-10-16 RX ADMIN — HYDROMORPHONE HYDROCHLORIDE 1 MILLIGRAM(S): 2 INJECTION INTRAMUSCULAR; INTRAVENOUS; SUBCUTANEOUS at 22:00

## 2018-10-16 RX ADMIN — Medication 650 MILLIGRAM(S): at 12:54

## 2018-10-16 NOTE — PROGRESS NOTE ADULT - SUBJECTIVE AND OBJECTIVE BOX
Diagnosis:    Protocol/Chemo Regimen:    Day:     Pt endorsed:    Review of Systems:     Pain scale:     Diet:     Allergies    No Known Drug Allergies  shellfish (Nausea; Vomiting)    Intolerances        ANTIMICROBIALS  cefepime   IVPB      cefepime   IVPB 2000 milliGRAM(s) IV Intermittent every 8 hours  micafungin IVPB 100 milliGRAM(s) IV Intermittent daily  vancomycin  IVPB 1000 milliGRAM(s) IV Intermittent every 12 hours      HEME/ONC MEDICATIONS      STANDING MEDICATIONS  allopurinol 300 milliGRAM(s) Oral daily  influenza   Vaccine 0.5 milliLiter(s) IntraMuscular once  levETIRAcetam 500 milliGRAM(s) Oral two times a day  multivitamin 1 Tablet(s) Oral daily  pantoprazole    Tablet 40 milliGRAM(s) Oral before breakfast  potassium phosphate IVPB 15 milliMole(s) IV Intermittent once  sodium chloride 0.9% lock flush 20 milliLiter(s) IV Push once  sodium chloride 0.9%. 1000 milliLiter(s) IV Continuous <Continuous>      PRN MEDICATIONS  acetaminophen   Tablet .. 650 milliGRAM(s) Oral every 6 hours PRN  acetaminophen   Tablet .. 650 milliGRAM(s) Oral every 6 hours PRN  HYDROmorphone  Injectable 0.5 milliGRAM(s) IV Push every 2 hours PRN  HYDROmorphone  Injectable 1 milliGRAM(s) IV Push every 4 hours PRN  metoclopramide Injectable 10 milliGRAM(s) IV Push every 6 hours PRN  sodium chloride 0.9% lock flush 10 milliLiter(s) IV Push every 1 hour PRN  sodium chloride 0.9% lock flush 10 milliLiter(s) IV Push every 12 hours PRN        Vital Signs Last 24 Hrs  T(C): 37.8 (16 Oct 2018 06:48), Max: 39 (16 Oct 2018 05:34)  T(F): 100.1 (16 Oct 2018 06:48), Max: 102.2 (16 Oct 2018 05:34)  HR: 111 (16 Oct 2018 05:34) (72 - 120)  BP: 141/72 (16 Oct 2018 05:34) (127/82 - 167/93)  BP(mean): --  RR: 18 (16 Oct 2018 05:34) (18 - 18)  SpO2: 98% (16 Oct 2018 05:34) (97% - 100%)    PHYSICAL EXAM  General: NAD  HEENT: PERRLA, EOMOI, clear oropharynx, anicteric sclera, pink conjunctiva  Neck: supple  CV: (+) S1/S2 RRR  Lungs: clear to auscultation, no wheezes or rales  Abdomen: soft, non-tender, non-distended (+) BS  Ext: no clubbing, cyanosis or edema  Skin: no rashes and no petechiae  Neuro: alert and oriented X 3, no focal deficits  Central Line:     RECENT CULTURES:  10-13 @ 03:01  .Urine Clean Catch (Midstream)  --  --  --    No growth  --  10-13 @ 02:31  .Blood Blood-Peripheral  --  --  --    No growth to date.  --        LABS:                        7.5    0.2   )-----------( 2        ( 16 Oct 2018 07:13 )             21.1         Mean Cell Volume : 92.2 fl  Mean Cell Hemoglobin : 32.8 pg  Mean Cell Hemoglobin Concentration : 35.6 gm/dL  Auto Neutrophil # : x  Auto Lymphocyte # : x  Auto Monocyte # : x  Auto Eosinophil # : x  Auto Basophil # : x  Auto Neutrophil % : x  Auto Lymphocyte % : x  Auto Monocyte % : x  Auto Eosinophil % : x  Auto Basophil % : x      10-16    139  |  105  |  15  ----------------------------<  157<H>  3.7   |  22  |  0.72    Ca    9.1      16 Oct 2018 07:12  Phos  1.7     10-16  Mg     2.0     10-16    TPro  5.5<L>  /  Alb  3.4  /  TBili  0.4  /  DBili  x   /  AST  50<H>  /  ALT  51<H>  /  AlkPhos  207<H>  10-16      Mg 2.0  Phos 1.7      PT/INR - ( 15 Oct 2018 06:59 )   PT: 10.8 sec;   INR: 0.99 ratio         PTT - ( 15 Oct 2018 06:59 )  PTT:29.8 sec      Uric Acid 1.7        RADIOLOGY & ADDITIONAL STUDIES: Diagnosis: Relapsed ALL Ph (+)     Protocol/Chemo Regimen: Inotuzamab Day 1,8 and 15    Day: 9    Pt endorsed: Headache stable with pain medications    Review of Systems: Patient denies dizziness, visual changes, chest pain, palpitations, SOB, abdominal pain, vomiting, diarrhea or dysuria.     Pain scale: denies now     Diet: Regular    Allergies; No Known Drug Allergies  shellfish (Nausea; Vomiting)      ANTIMICROBIALS  cefepime   IVPB 2000 milliGRAM(s) IV Intermittent every 8 hours  micafungin IVPB 100 milliGRAM(s) IV Intermittent daily  vancomycin  IVPB 1000 milliGRAM(s) IV Intermittent every 12 hours      STANDING MEDICATIONS  allopurinol 300 milliGRAM(s) Oral daily  influenza   Vaccine 0.5 milliLiter(s) IntraMuscular once  levETIRAcetam 500 milliGRAM(s) Oral two times a day  multivitamin 1 Tablet(s) Oral daily  pantoprazole    Tablet 40 milliGRAM(s) Oral before breakfast  potassium phosphate IVPB 15 milliMole(s) IV Intermittent once  sodium chloride 0.9% lock flush 20 milliLiter(s) IV Push once  sodium chloride 0.9%. 1000 milliLiter(s) IV Continuous <Continuous>      PRN MEDICATIONS  acetaminophen   Tablet .. 650 milliGRAM(s) Oral every 6 hours PRN  acetaminophen   Tablet .. 650 milliGRAM(s) Oral every 6 hours PRN  HYDROmorphone  Injectable 0.5 milliGRAM(s) IV Push every 2 hours PRN  HYDROmorphone  Injectable 1 milliGRAM(s) IV Push every 4 hours PRN  metoclopramide Injectable 10 milliGRAM(s) IV Push every 6 hours PRN  sodium chloride 0.9% lock flush 10 milliLiter(s) IV Push every 1 hour PRN  sodium chloride 0.9% lock flush 10 milliLiter(s) IV Push every 12 hours PRN      Vital Signs Last 24 Hrs  T(C): 37.8 (16 Oct 2018 06:48), Max: 39 (16 Oct 2018 05:34)  T(F): 100.1 (16 Oct 2018 06:48), Max: 102.2 (16 Oct 2018 05:34)  HR: 111 (16 Oct 2018 05:34) (72 - 120)  BP: 141/72 (16 Oct 2018 05:34) (127/82 - 167/93)  BP(mean): --  RR: 18 (16 Oct 2018 05:34) (18 - 18)  SpO2: 98% (16 Oct 2018 05:34) (97% - 100%)      PHYSICAL EXAM  General: NAD  HEENT: PERRLA, EOMI, clear oropharynx, anicteric sclera, pink conjunctiva  Neck: supple  CV: (+) S1/S2 RRR  Lungs: clear to auscultation, no wheezes or rales  Abdomen: soft, non-tender, non-distended (+) BS  Ext: no clubbing, cyanosis or edema  Skin: no rashes and no petechiae  Neuro: alert and oriented X 3, no focal deficits  Central Line: C/D/I       LABS:                        7.5    0.2   )-----------( 2        ( 16 Oct 2018 07:13 )             21.1         Mean Cell Volume : 92.2 fl  Mean Cell Hemoglobin : 32.8 pg  Mean Cell Hemoglobin Concentration : 35.6 gm/dL  Auto Neutrophil # : x  Auto Lymphocyte # : x  Auto Monocyte # : x  Auto Eosinophil # : x  Auto Basophil # : x  Auto Neutrophil % : x  Auto Lymphocyte % : x  Auto Monocyte % : x  Auto Eosinophil % : x  Auto Basophil % : x      10-16    139  |  105  |  15  ----------------------------<  157<H>  3.7   |  22  |  0.72    Ca    9.1      16 Oct 2018 07:12  Phos  1.7     10-16  Mg     2.0     10-16    TPro  5.5<L>  /  Alb  3.4  /  TBili  0.4  /  DBili  x   /  AST  50<H>  /  ALT  51<H>  /  AlkPhos  207<H>  10-16      Mg 2.0  Phos 1.7      PT/INR - ( 15 Oct 2018 06:59 )   PT: 10.8 sec;   INR: 0.99 ratio         PTT - ( 15 Oct 2018 06:59 )  PTT:29.8 sec      Uric Acid 1.7        RECENT CULTURES:    Culture - Urine (10.13.18 @ 03:01)    Specimen Source: .Urine Clean Catch (Midstream)    Culture Results:   No growth    Culture - Blood (10.13.18 @ 02:31)    Specimen Source: .Blood Blood-Peripheral    Culture Results:   No growth to date.      RADIOLOGY & ADDITIONAL STUDIES:    EXAM:  CT BRAIN                        PROCEDURE DATE:  10/11/2018    IMPRESSION: Small residual hypodense left frontoparietal subdural collection, without   significant change in size.   Unchanged posterior left parafalcine and left tentorial subdural hematoma.  No new areas of acute intracranial hemorrhage.      EXAM:  XR CHEST PORTABLE URGENT 1V                        PROCEDURE DATE:  10/09/2018    Impression: Low lung volumes without consolidations or edema. No pneumothorax  Right PICC line in expected location      EXAM:  CT BRAIN                        PROCEDURE DATE:  10/06/2018    Impression: No significant change when allowing for differences in   technique.      EXAM:  CT ABDOMEN AND PELVIS                        PROCEDURE DATE:  10/05/2018    IMPRESSION: No evidence of retroperitoneal bleed.  Cholelithiasis. Diagnosis: Relapsed ALL Ph (+)     Protocol/Chemo Regimen: Inotuzamab Day 1,8 and 15    Day: 9    Pt endorsed: Headache stable with pain medications    Review of Systems: Patient denies dizziness, visual changes, chest pain, palpitations, SOB, abdominal pain, vomiting, diarrhea or dysuria.     Pain scale: denies now     Diet: Regular    Allergies; No Known Drug Allergies  shellfish (Nausea; Vomiting)      ANTIMICROBIALS  cefepime   IVPB 2000 milliGRAM(s) IV Intermittent every 8 hours  micafungin IVPB 100 milliGRAM(s) IV Intermittent daily  vancomycin  IVPB 1000 milliGRAM(s) IV Intermittent every 12 hours      STANDING MEDICATIONS  allopurinol 300 milliGRAM(s) Oral daily  influenza   Vaccine 0.5 milliLiter(s) IntraMuscular once  levETIRAcetam 500 milliGRAM(s) Oral two times a day  multivitamin 1 Tablet(s) Oral daily  pantoprazole    Tablet 40 milliGRAM(s) Oral before breakfast  potassium phosphate IVPB 15 milliMole(s) IV Intermittent once  sodium chloride 0.9% lock flush 20 milliLiter(s) IV Push once  sodium chloride 0.9%. 1000 milliLiter(s) IV Continuous <Continuous>      PRN MEDICATIONS  acetaminophen   Tablet .. 650 milliGRAM(s) Oral every 6 hours PRN  acetaminophen   Tablet .. 650 milliGRAM(s) Oral every 6 hours PRN  HYDROmorphone  Injectable 0.5 milliGRAM(s) IV Push every 2 hours PRN  HYDROmorphone  Injectable 1 milliGRAM(s) IV Push every 4 hours PRN  metoclopramide Injectable 10 milliGRAM(s) IV Push every 6 hours PRN  sodium chloride 0.9% lock flush 10 milliLiter(s) IV Push every 1 hour PRN  sodium chloride 0.9% lock flush 10 milliLiter(s) IV Push every 12 hours PRN      Vital Signs Last 24 Hrs  T(C): 37.8 (16 Oct 2018 06:48), Max: 39 (16 Oct 2018 05:34)  T(F): 100.1 (16 Oct 2018 06:48), Max: 102.2 (16 Oct 2018 05:34)  HR: 111 (16 Oct 2018 05:34) (72 - 120)  BP: 141/72 (16 Oct 2018 05:34) (127/82 - 167/93)  BP(mean): --  RR: 18 (16 Oct 2018 05:34) (18 - 18)  SpO2: 98% (16 Oct 2018 05:34) (97% - 100%)      PHYSICAL EXAM  General: NAD  HEENT: PERRLA, EOMI, clear oropharynx, anicteric sclera, pink conjunctiva  Neck: supple  CV: (+) S1/S2 tachy/regular  Lungs: clear to auscultation, no wheezes or rales  Abdomen: soft, non-tender, non-distended (+) BS  Ext: no clubbing, cyanosis or edema  Skin: no rashes and no petechiae  Neuro: alert and oriented X 3, no focal deficits  Central Line: C/D/I       LABS:                        7.5    0.2   )-----------( 2        ( 16 Oct 2018 07:13 )             21.1         Mean Cell Volume : 92.2 fl  Mean Cell Hemoglobin : 32.8 pg  Mean Cell Hemoglobin Concentration : 35.6 gm/dL  Auto Neutrophil # : x  Auto Lymphocyte # : x  Auto Monocyte # : x  Auto Eosinophil # : x  Auto Basophil # : x  Auto Neutrophil % : x  Auto Lymphocyte % : x  Auto Monocyte % : x  Auto Eosinophil % : x  Auto Basophil % : x      10-16    139  |  105  |  15  ----------------------------<  157<H>  3.7   |  22  |  0.72    Ca    9.1      16 Oct 2018 07:12  Phos  1.7     10-16  Mg     2.0     10-16    TPro  5.5<L>  /  Alb  3.4  /  TBili  0.4  /  DBili  x   /  AST  50<H>  /  ALT  51<H>  /  AlkPhos  207<H>  10-16      Mg 2.0  Phos 1.7      PT/INR - ( 15 Oct 2018 06:59 )   PT: 10.8 sec;   INR: 0.99 ratio         PTT - ( 15 Oct 2018 06:59 )  PTT:29.8 sec      Uric Acid 1.7        RECENT CULTURES:    Culture - Urine (10.13.18 @ 03:01)    Specimen Source: .Urine Clean Catch (Midstream)    Culture Results:   No growth    Culture - Blood (10.13.18 @ 02:31)    Specimen Source: .Blood Blood-Peripheral    Culture Results:   No growth to date.      RADIOLOGY & ADDITIONAL STUDIES:    EXAM:  CT BRAIN                        PROCEDURE DATE:  10/11/2018    IMPRESSION: Small residual hypodense left frontoparietal subdural collection, without   significant change in size.   Unchanged posterior left parafalcine and left tentorial subdural hematoma.  No new areas of acute intracranial hemorrhage.      EXAM:  XR CHEST PORTABLE URGENT 1V                        PROCEDURE DATE:  10/09/2018    Impression: Low lung volumes without consolidations or edema. No pneumothorax  Right PICC line in expected location      EXAM:  CT BRAIN                        PROCEDURE DATE:  10/06/2018    Impression: No significant change when allowing for differences in   technique.      EXAM:  CT ABDOMEN AND PELVIS                        PROCEDURE DATE:  10/05/2018    IMPRESSION: No evidence of retroperitoneal bleed.  Cholelithiasis.

## 2018-10-16 NOTE — CHART NOTE - NSCHARTNOTEFT_GEN_A_CORE
MEDICINE PA    Notified by RN patient with temperature 101.4. Seen and examined patient at bedside. Patient is alert, NAD. Denies rigors, chills, dysuria, CP, SOB, cough, N/V, or abd pain.    VITAL SIGNS:  T(C): 37.9 (10-17-18 @ 05:44), Max: 39.2 (10-17-18 @ 03:40)  HR: 95 (10-17-18 @ 05:44) (88 - 111)  BP: 137/74 (10-17-18 @ 05:44) (136/83 - 155/97)  RR: 18 (10-17-18 @ 05:44) (18 - 18)  SpO2: 95% (10-17-18 @ 05:44) (95% - 100%)      PHYSICAL EXAM:  Constitutional: AOx3. NAD.  Neuro:  Grossly intact   Cardiovascular: +S1 S2. tachy.    Respiratory:  Even, unlabored.  Clear lungs bilaterally.   Gastrointestinal: +BS X4 active. Soft. Nontender. Nondistended   Extremities/Vascular: +2 pulses bilaterally.   Skin:  No rash    MEDICATIONS:    cefepime   IVPB 2000 milliGRAM(s) IV Intermittent every 8 hours  micafungin IVPB 100 milliGRAM(s) IV Intermittent daily  vancomycin  IVPB 1000 milliGRAM(s) IV Intermittent every 12 hours      LABORATORY:                  8.2    0.2   )-----------( x        ( 17 Oct 2018 07:01 )             22.5       UA:   Urinalysis Basic - ( 16 Oct 2018 09:36 )    Color: Yellow / Appearance: Clear / S.017 / pH: x  Gluc: x / Ketone: Negative  / Bili: Negative / Urobili: Negative mg/dL   Blood: x / Protein: 100 mg/dL / Nitrite: Negative   Leuk Esterase: Negative / RBC: 18 /HPF / WBC 1 /HPF   Sq Epi: x / Non Sq Epi: 2 /HPF / Bacteria: Negative    MICROBIOLOGY:   10/16 Urine Culture: Pending  10/16 Blood Culture: Pending    RADIOLOGY:  10/16CT CAP: Prelim: INTERPRETATION:  Right upper extremity PICC with tip in the SVC. Trace   pericardial effusion. Two small nonspecific foci of groundglass opacity   in the right upper and lower lobes. Cholelithiasis. Bilateral nonspecific   perinephric fluid and parapelvic cysts. Stable 1.8 cm left adrenal   adenoma. Prominent endometrial lining.    ASSESSMENT/PLAN:   HPI: 58F hx HTN, Obesity, Ph(+) ALL 57 yo F with PMHx of IT MTX x2 (via Omaya) followed by Autologous HPSCT on 16 then on Sprycel maintenance admitted for relapsed disease. Acutely presenting with recurrent fever.    1) Fever - Recurrent 2/2 unclear source  -Tylenol / Cooling measures prn for Pyrexia  - Official read CT CAP pending   - c/w Current Abx/ Antifungal/ IVF regimen   - Endorsed to primary team in AM. Attending to follow    Kellee Gonzalez PA-C  Department of Medicine   *Late entry note- pt seen and examined at time of fever*

## 2018-10-16 NOTE — PROGRESS NOTE ADULT - PROBLEM SELECTOR PLAN 2
Pt is neutropenic, febrile  Continue Cefepime and Posaconazole   If febrile Pan Cx, add vanco and CT C/A/P   Cultures NGT   10/5 CT A&P revealing cholelithiases, no source of infection  10/4 QuantiFeron gold (-) Pt is neutropenic, febrile  If febrile Pan Cx and CXR  Continue Cefepime, Vancomycin and Posaconazole   10/5 CT A&P revealing cholelithiases, no source of infection  10/4 QuantiFeron gold (-)  10/16 Follow up CT C/A/P to look for occult source of fevers Pt is neutropenic, febrile possibly due to Inotuzamab yesterday  If febrile Pan Cx and CXR  Continue Cefepime, Vancomycin and Posaconazole   10/16 Follow up CT C/A/P to look for occult source of fevers

## 2018-10-16 NOTE — PROGRESS NOTE ADULT - PROBLEM SELECTOR PLAN 3
10/4 CT Head revealed small bilateral acute on chronic convexity subdural hematomas. Repeat CT Head on 10/5, 10/6 and 10/11 is stable   10/4 Given DDAVP   Continue Keppra 500mg BID for seizure ppx   Keep PLT >80K  Neuro checks q4 hrs  Pain management   Neurosurgery following, no intervention at this time. Continue to keep platelet goal above 80 now per neuro surgery 10/4 CT Head revealed small bilateral acute on chronic convexity subdural hematomas. Repeat CT Head on 10/5, 10/6 and 10/11 is stable   10/4 Given DDAVP   Continue Keppra 500mg BID for seizure ppx   Keep PLT >80K  Neuro checks q4 hrs  Pain management   Neurosurgery following, no intervention at this time. Continue to keep platelet goal above 80 now per neuro surgery and do not tap Omaya until platelets goal met and remains stable 10/4 CT Head revealed small bilateral acute on chronic convexity subdural hematomas. Repeat CT Head on 10/5, 10/6 and 10/11 is stable   10/4 Given DDAVP   Continue Keppra 500mg BID for seizure ppx   Keep PLT >80K  Neuro checks q4 hrs  Pain management   Neurosurgery following, no intervention at this time. Continue to keep platelet goal above 80 now per neuro surgery and do not tap Omaya until platelets goal met and remains stable  Repeat CT Head today

## 2018-10-16 NOTE — PROGRESS NOTE ADULT - PROBLEM SELECTOR PLAN 4
No pharmacologic ppx 2/2 thrombocytopenia and SDH mild  likely 2/2 to Inotuzamab  Monitor daily CMP  Avoid hepatotoxic medications

## 2018-10-16 NOTE — PROGRESS NOTE ADULT - PROBLEM SELECTOR PLAN 1
10/5 Peripheral flow confirms relapse with BCR/ABL rearrangement in 79.5% of cells  Continue Inotuzamab (Day 1, 8 and 15)  Monitor CBC/Lytes and transfuse/replete PRN  Thrombocytopenia with SDH: . Per blood bank patient to receive 1/2 bags over 3 hours q 12 hours. HLA to be given once available  Strict Is and Os/Daily weights/Mouth Care  Allopurinol 300mg PO daily  Antiemetics  IVF 10/5 Peripheral flow confirms relapse with BCR/ABL rearrangement in 79.5% of cells  Continue Inotuzamab (Day 1, 8 and 15)  Monitor CBC/Lytes and transfuse/replete PRN  Thrombocytopenia with SDH: Per blood bank patient to receive 1/2 bags over 3 hours q 12 hours. HLA to be given once available  Hypophosphatemia: K Phos 15 mmol x 1 with repeat level after  Strict Is and Os/Daily weights/Mouth Care  Allopurinol 300mg PO daily  Antiemetics  IVF

## 2018-10-16 NOTE — PROGRESS NOTE ADULT - ASSESSMENT
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 Now being treated with Inatuzumab day 1, 8, 15. The patients hospital course has been complicated by SDH, neutropenic fevers , transaminitis and upper GIB. 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 Now being treated with Inatuzumab day 1, 8, 15. The patients hospital course has been complicated by SDH, neutropenic fevers, transaminitis and upper GIB.

## 2018-10-16 NOTE — PROGRESS NOTE ADULT - ATTENDING COMMENTS
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 FISH and PCR negative. s/p Masha-Auto on 12/16/16. Pt was on sprycel maintenance. 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  Now getting Inotuzumab C1 day 8- dose today.    -Febrile. BCx/UCx negative 10/13. Cont Cefepime/mycamine.  If she continues to spike, will add vancomycin and plan for a CT chest/abd/pelvis.    - cont to keep plt ct >80K, keep hb >7. Will repeat CBC after plt transfusion to assess if pt is platelet refractory  - cont keppra px.  Repeat head CT stable 10/11.  Repeat head CT- f/u neurosurgery regarding plt threshold and when can we tap her ommaya.    - GI bleed has resolved, continue po protonix.  - OOB, ambulation. 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 FISH and PCR negative. s/p Masha-Auto on 12/16/16. Pt was on sprycel maintenance. 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  Now getting Inotuzumab C1 day 9.    -Febrile. BCx/UCx negative 10/13. Cont Cefepime/mycamine, started on vancomycin.  Plan for a CT of her H/C/A/P.    - cont to keep plt ct >80K, keep hb >7.    - cont keppra px.  Repeat head CT stable 10/11.  Repeat head CT- f/u neurosurgery regarding plt threshold and when can we tap her ommaya.    - GI bleed has resolved, continue po protonix.  - OOB, ambulation.

## 2018-10-17 DIAGNOSIS — H11.31 CONJUNCTIVAL HEMORRHAGE, RIGHT EYE: ICD-10-CM

## 2018-10-17 LAB
ALBUMIN SERPL ELPH-MCNC: 3.7 G/DL — SIGNIFICANT CHANGE UP (ref 3.3–5)
ALP SERPL-CCNC: 193 U/L — HIGH (ref 40–120)
ALT FLD-CCNC: 54 U/L — HIGH (ref 10–45)
ANION GAP SERPL CALC-SCNC: 12 MMOL/L — SIGNIFICANT CHANGE UP (ref 5–17)
AST SERPL-CCNC: 25 U/L — SIGNIFICANT CHANGE UP (ref 10–40)
BILIRUB SERPL-MCNC: 0.7 MG/DL — SIGNIFICANT CHANGE UP (ref 0.2–1.2)
BUN SERPL-MCNC: 8 MG/DL — SIGNIFICANT CHANGE UP (ref 7–23)
CALCIUM SERPL-MCNC: 9.2 MG/DL — SIGNIFICANT CHANGE UP (ref 8.4–10.5)
CHLORIDE SERPL-SCNC: 102 MMOL/L — SIGNIFICANT CHANGE UP (ref 96–108)
CO2 SERPL-SCNC: 23 MMOL/L — SIGNIFICANT CHANGE UP (ref 22–31)
CREAT SERPL-MCNC: 0.75 MG/DL — SIGNIFICANT CHANGE UP (ref 0.5–1.3)
CULTURE RESULTS: NO GROWTH — SIGNIFICANT CHANGE UP
GLUCOSE SERPL-MCNC: 126 MG/DL — HIGH (ref 70–99)
HCT VFR BLD CALC: 22.5 % — LOW (ref 34.5–45)
HGB BLD-MCNC: 8.2 G/DL — LOW (ref 11.5–15.5)
LDH SERPL L TO P-CCNC: 317 U/L — HIGH (ref 50–242)
MAGNESIUM SERPL-MCNC: 2 MG/DL — SIGNIFICANT CHANGE UP (ref 1.6–2.6)
MCHC RBC-ENTMCNC: 33.7 PG — SIGNIFICANT CHANGE UP (ref 27–34)
MCHC RBC-ENTMCNC: 36.4 GM/DL — HIGH (ref 32–36)
MCV RBC AUTO: 92.5 FL — SIGNIFICANT CHANGE UP (ref 80–100)
PHOSPHATE SERPL-MCNC: 1.9 MG/DL — LOW (ref 2.5–4.5)
PHOSPHATE SERPL-MCNC: 3.8 MG/DL — SIGNIFICANT CHANGE UP (ref 2.5–4.5)
PLATELET # BLD AUTO: 16 K/UL — CRITICAL LOW (ref 150–400)
PLATELET # BLD AUTO: 3 K/UL — CRITICAL LOW (ref 150–400)
POTASSIUM SERPL-MCNC: 3.7 MMOL/L — SIGNIFICANT CHANGE UP (ref 3.5–5.3)
POTASSIUM SERPL-SCNC: 3.7 MMOL/L — SIGNIFICANT CHANGE UP (ref 3.5–5.3)
PROT SERPL-MCNC: 6.3 G/DL — SIGNIFICANT CHANGE UP (ref 6–8.3)
RBC # BLD: 2.43 M/UL — LOW (ref 3.8–5.2)
RBC # FLD: 15.4 % — HIGH (ref 10.3–14.5)
SODIUM SERPL-SCNC: 137 MMOL/L — SIGNIFICANT CHANGE UP (ref 135–145)
SPECIMEN SOURCE: SIGNIFICANT CHANGE UP
URATE SERPL-MCNC: 1.4 MG/DL — LOW (ref 2.5–7)
VANCOMYCIN TROUGH SERPL-MCNC: 9.1 UG/ML — LOW (ref 10–20)
WBC # BLD: 0.2 K/UL — CRITICAL LOW (ref 3.8–10.5)
WBC # FLD AUTO: 0.2 K/UL — CRITICAL LOW (ref 3.8–10.5)

## 2018-10-17 PROCEDURE — 99233 SBSQ HOSP IP/OBS HIGH 50: CPT

## 2018-10-17 RX ORDER — VANCOMYCIN HCL 1 G
VIAL (EA) INTRAVENOUS
Qty: 0 | Refills: 0 | Status: DISCONTINUED | OUTPATIENT
Start: 2018-10-17 | End: 2018-10-22

## 2018-10-17 RX ORDER — VANCOMYCIN HCL 1 G
1000 VIAL (EA) INTRAVENOUS ONCE
Qty: 0 | Refills: 0 | Status: COMPLETED | OUTPATIENT
Start: 2018-10-17 | End: 2018-10-17

## 2018-10-17 RX ORDER — ACETAMINOPHEN 500 MG
1000 TABLET ORAL ONCE
Qty: 0 | Refills: 0 | Status: COMPLETED | OUTPATIENT
Start: 2018-10-17 | End: 2018-10-17

## 2018-10-17 RX ORDER — POTASSIUM PHOSPHATE, MONOBASIC POTASSIUM PHOSPHATE, DIBASIC 236; 224 MG/ML; MG/ML
15 INJECTION, SOLUTION INTRAVENOUS ONCE
Qty: 0 | Refills: 0 | Status: COMPLETED | OUTPATIENT
Start: 2018-10-17 | End: 2018-10-17

## 2018-10-17 RX ORDER — MEROPENEM 1 G/30ML
2000 INJECTION INTRAVENOUS ONCE
Qty: 0 | Refills: 0 | Status: COMPLETED | OUTPATIENT
Start: 2018-10-17 | End: 2018-10-17

## 2018-10-17 RX ORDER — SODIUM,POTASSIUM PHOSPHATES 278-250MG
1 POWDER IN PACKET (EA) ORAL
Qty: 0 | Refills: 0 | Status: COMPLETED | OUTPATIENT
Start: 2018-10-17 | End: 2018-10-17

## 2018-10-17 RX ORDER — MEROPENEM 1 G/30ML
INJECTION INTRAVENOUS
Qty: 0 | Refills: 0 | Status: DISCONTINUED | OUTPATIENT
Start: 2018-10-17 | End: 2018-10-22

## 2018-10-17 RX ORDER — MEROPENEM 1 G/30ML
2000 INJECTION INTRAVENOUS EVERY 8 HOURS
Qty: 0 | Refills: 0 | Status: DISCONTINUED | OUTPATIENT
Start: 2018-10-17 | End: 2018-10-22

## 2018-10-17 RX ORDER — VANCOMYCIN HCL 1 G
1000 VIAL (EA) INTRAVENOUS EVERY 8 HOURS
Qty: 0 | Refills: 0 | Status: DISCONTINUED | OUTPATIENT
Start: 2018-10-18 | End: 2018-10-22

## 2018-10-17 RX ADMIN — Medication 1 TABLET(S): at 11:55

## 2018-10-17 RX ADMIN — MEROPENEM 200 MILLIGRAM(S): 1 INJECTION INTRAVENOUS at 10:21

## 2018-10-17 RX ADMIN — LEVETIRACETAM 500 MILLIGRAM(S): 250 TABLET, FILM COATED ORAL at 05:12

## 2018-10-17 RX ADMIN — MEROPENEM 200 MILLIGRAM(S): 1 INJECTION INTRAVENOUS at 14:09

## 2018-10-17 RX ADMIN — HYDROMORPHONE HYDROCHLORIDE 1 MILLIGRAM(S): 2 INJECTION INTRAMUSCULAR; INTRAVENOUS; SUBCUTANEOUS at 22:00

## 2018-10-17 RX ADMIN — HYDROMORPHONE HYDROCHLORIDE 1 MILLIGRAM(S): 2 INJECTION INTRAMUSCULAR; INTRAVENOUS; SUBCUTANEOUS at 12:31

## 2018-10-17 RX ADMIN — SODIUM CHLORIDE 50 MILLILITER(S): 9 INJECTION INTRAMUSCULAR; INTRAVENOUS; SUBCUTANEOUS at 05:12

## 2018-10-17 RX ADMIN — SODIUM CHLORIDE 50 MILLILITER(S): 9 INJECTION INTRAMUSCULAR; INTRAVENOUS; SUBCUTANEOUS at 17:02

## 2018-10-17 RX ADMIN — HYDROMORPHONE HYDROCHLORIDE 0.5 MILLIGRAM(S): 2 INJECTION INTRAMUSCULAR; INTRAVENOUS; SUBCUTANEOUS at 17:15

## 2018-10-17 RX ADMIN — CEFEPIME 100 MILLIGRAM(S): 1 INJECTION, POWDER, FOR SOLUTION INTRAMUSCULAR; INTRAVENOUS at 05:11

## 2018-10-17 RX ADMIN — Medication 250 MILLIGRAM(S): at 18:29

## 2018-10-17 RX ADMIN — HYDROMORPHONE HYDROCHLORIDE 0.5 MILLIGRAM(S): 2 INJECTION INTRAMUSCULAR; INTRAVENOUS; SUBCUTANEOUS at 05:10

## 2018-10-17 RX ADMIN — MEROPENEM 200 MILLIGRAM(S): 1 INJECTION INTRAVENOUS at 22:03

## 2018-10-17 RX ADMIN — HYDROMORPHONE HYDROCHLORIDE 1 MILLIGRAM(S): 2 INJECTION INTRAMUSCULAR; INTRAVENOUS; SUBCUTANEOUS at 07:24

## 2018-10-17 RX ADMIN — POTASSIUM PHOSPHATE, MONOBASIC POTASSIUM PHOSPHATE, DIBASIC 62.5 MILLIMOLE(S): 236; 224 INJECTION, SOLUTION INTRAVENOUS at 04:45

## 2018-10-17 RX ADMIN — Medication 1 TABLET(S): at 11:22

## 2018-10-17 RX ADMIN — HYDROMORPHONE HYDROCHLORIDE 1 MILLIGRAM(S): 2 INJECTION INTRAMUSCULAR; INTRAVENOUS; SUBCUTANEOUS at 12:46

## 2018-10-17 RX ADMIN — Medication 1 TABLET(S): at 10:21

## 2018-10-17 RX ADMIN — MICAFUNGIN SODIUM 105 MILLIGRAM(S): 100 INJECTION, POWDER, LYOPHILIZED, FOR SOLUTION INTRAVENOUS at 11:55

## 2018-10-17 RX ADMIN — HYDROMORPHONE HYDROCHLORIDE 0.5 MILLIGRAM(S): 2 INJECTION INTRAMUSCULAR; INTRAVENOUS; SUBCUTANEOUS at 10:28

## 2018-10-17 RX ADMIN — LEVETIRACETAM 500 MILLIGRAM(S): 250 TABLET, FILM COATED ORAL at 17:01

## 2018-10-17 RX ADMIN — Medication 250 MILLIGRAM(S): at 06:45

## 2018-10-17 RX ADMIN — Medication 300 MILLIGRAM(S): at 11:22

## 2018-10-17 RX ADMIN — POTASSIUM PHOSPHATE, MONOBASIC POTASSIUM PHOSPHATE, DIBASIC 62.5 MILLIMOLE(S): 236; 224 INJECTION, SOLUTION INTRAVENOUS at 11:19

## 2018-10-17 RX ADMIN — HYDROMORPHONE HYDROCHLORIDE 0.5 MILLIGRAM(S): 2 INJECTION INTRAMUSCULAR; INTRAVENOUS; SUBCUTANEOUS at 10:35

## 2018-10-17 RX ADMIN — Medication 10 MILLIGRAM(S): at 02:50

## 2018-10-17 RX ADMIN — Medication 650 MILLIGRAM(S): at 21:35

## 2018-10-17 RX ADMIN — HYDROMORPHONE HYDROCHLORIDE 0.5 MILLIGRAM(S): 2 INJECTION INTRAMUSCULAR; INTRAVENOUS; SUBCUTANEOUS at 05:40

## 2018-10-17 RX ADMIN — HYDROMORPHONE HYDROCHLORIDE 1 MILLIGRAM(S): 2 INJECTION INTRAMUSCULAR; INTRAVENOUS; SUBCUTANEOUS at 02:50

## 2018-10-17 RX ADMIN — PANTOPRAZOLE SODIUM 40 MILLIGRAM(S): 20 TABLET, DELAYED RELEASE ORAL at 05:12

## 2018-10-17 RX ADMIN — HYDROMORPHONE HYDROCHLORIDE 1 MILLIGRAM(S): 2 INJECTION INTRAMUSCULAR; INTRAVENOUS; SUBCUTANEOUS at 07:40

## 2018-10-17 RX ADMIN — HYDROMORPHONE HYDROCHLORIDE 1 MILLIGRAM(S): 2 INJECTION INTRAMUSCULAR; INTRAVENOUS; SUBCUTANEOUS at 03:20

## 2018-10-17 RX ADMIN — Medication 400 MILLIGRAM(S): at 03:59

## 2018-10-17 RX ADMIN — HYDROMORPHONE HYDROCHLORIDE 0.5 MILLIGRAM(S): 2 INJECTION INTRAMUSCULAR; INTRAVENOUS; SUBCUTANEOUS at 16:59

## 2018-10-17 RX ADMIN — HYDROMORPHONE HYDROCHLORIDE 1 MILLIGRAM(S): 2 INJECTION INTRAMUSCULAR; INTRAVENOUS; SUBCUTANEOUS at 21:30

## 2018-10-17 RX ADMIN — Medication 1 TABLET(S): at 21:32

## 2018-10-17 RX ADMIN — Medication 1 TABLET(S): at 17:00

## 2018-10-17 RX ADMIN — Medication 650 MILLIGRAM(S): at 12:34

## 2018-10-17 NOTE — PROGRESS NOTE ADULT - PROBLEM SELECTOR PLAN 3
10/4 CT Head revealed small bilateral acute on chronic convexity subdural hematomas. Repeat CT Head on 10/5, 10/6 and 10/11 is stable   10/4 Given DDAVP   Continue Keppra 500mg BID for seizure ppx   Keep PLT >80K  Neuro checks q4 hrs  Pain management   Neurosurgery following, no intervention at this time. Continue to keep platelet goal above 80 now per neuro surgery and do not tap Omaya until platelets goal met and remains stable  Repeat CT Head today 10/4 CT Head revealed small bilateral acute on chronic convexity subdural hematomas. Repeat CT Head on 10/5, 10/6 and 10/11 is stable   10/4 Given DDAVP   Continue Keppra 500mg BID for seizure ppx   Keep PLT >80K  Neuro checks q4 hrs  Pain management   Neurosurgery following, no intervention at this time. Continue to keep platelet goal above 80K now per neuro surgery and do not tap Omaya until platelets are at a minimum of 50K  Repeat CT Head today 10/4 CT Head revealed small bilateral acute on chronic convexity subdural hematomas. Repeat CT Head on 10/5, 10/6 and 10/11 is stable with 10/16 decreasing in size  10/4 Given DDAVP   Continue Keppra 500mg BID for seizure ppx   Keep PLT >80K  Neuro checks q4 hrs  Pain management   Neurosurgery following, no intervention at this time. Continue to keep platelet goal above 80K now per neuro surgery and do not tap Omaya until platelets are at a minimum of 50K as SDH may increase

## 2018-10-17 NOTE — PROGRESS NOTE ADULT - PROBLEM SELECTOR PLAN 1
10/5 Peripheral flow confirms relapse with BCR/ABL rearrangement in 79.5% of cells  Continue Inotuzamab (Day 1, 8 and 15)  Monitor CBC/Lytes and transfuse/replete PRN  Thrombocytopenia with SDH: Per blood bank patient to receive 1/2 bags over 3 hours q 12 hours. HLA to be given once available  Hypophosphatemia: K Phos 15 mmol x 1 with repeat level after  Strict Is and Os/Daily weights/Mouth Care  Allopurinol 300mg PO daily  Antiemetics  IVF 10/5 Peripheral flow confirms relapse with BCR/ABL rearrangement in 79.5% of cells  Continue Inotuzamab (Day 1, 8 and 15)  Monitor CBC/Lytes and transfuse/replete PRN  Thrombocytopenia with SDH: Per blood bank patient to receive 1/2 bags over 3 hours q 12 hours. HLA to be given once available  Hypophosphatemia: K Phos 15 mmol x 1 and K Phos PO x 4 with repeat level after IV administered  Strict Is and Os/Daily weights/Mouth Care  Allopurinol 300mg PO daily  Antiemetics  IVF 10/5 Peripheral flow confirms relapse with BCR/ABL rearrangement in 79.5% of cells  Continue Inotuzamab (Day 1, 8 and 15)  Monitor CBC/Lytes and transfuse/replete PRN  Thrombocytopenia with SDH: Per blood bank patient to receive 1/2 bags over 3 hours q 12 hours. HLA available today will transfuse over 1 hour and follow up post PLT count  Hypophosphatemia: K Phos 15 mmol x 1 and K Phos PO x 4 with repeat level after IV administered  Strict Is and Os/Daily weights/Mouth Care  Allopurinol 300mg PO daily  Antiemetics  IVF 10/5 Peripheral flow confirms relapse with BCR/ABL rearrangement in 79.5% of cells  Continue Inotuzamab (Day 1, 8 and 15)  Monitor CBC/Lytes and transfuse/replete PRN  Thrombocytopenia with SDH: Patient platelet refractory, per blood bank patient to receive 1/2 bags over 3 hours q 12 hours. HLA was available today and post platelet count was 16K. Discussed with blood bank will give another 1/2 bag 12 hours after last given.  Hypophosphatemia: K Phos 15 mmol x 1 and K Phos PO x 4 with repeat level after IV administered  Strict Is and Os/Daily weights/Mouth Care  Allopurinol 300mg PO daily  Antiemetics  IVF

## 2018-10-17 NOTE — PROGRESS NOTE ADULT - ASSESSMENT
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 Now being treated with Inatuzumab day 1, 8, 15. The patients hospital course has been complicated by SDH, neutropenic fevers, transaminitis and upper GIB.

## 2018-10-17 NOTE — PROGRESS NOTE ADULT - PROBLEM SELECTOR PLAN 2
Pt is neutropenic, febrile possibly due to Inotuzamab yesterday  If febrile Pan Cx and CXR  Continue Cefepime, Vancomycin and Posaconazole   10/16 Follow up CT C/A/P to look for occult source of fevers Pt is neutropenic, febrile   If febrile Pan Cx and CXR  Continue Cefepime, Vancomycin and Posaconazole   10/16 Follow up CT C/A/P to look for occult source of fevers Pt is neutropenic, febrile   If febrile Pan Cx and CXR  Continue Vancomycin and Posaconazole. Will broaden Cefepime to Meropenem today  10/16 CT C/A/P with Scattered and patchy groundglass opacities in the right upper and lower lobes, possibly infection. Continue current regimen Pt is neutropenic, febrile   If febrile Pan Cx and CXR  Continue Vancomycin and Posaconazole. Will broaden Cefepime to Meropenem today  10/16 CT C/A/P with Scattered and patchy groundglass opacities in the right upper and lower lobes, possibly infection. Continue current regimen follow up Galactomannan and Fungitell  ID consult pending

## 2018-10-17 NOTE — CHART NOTE - NSCHARTNOTEFT_GEN_A_CORE
MEDICINE PA     Notified by RN patient with temperature . Seen and examined patient at bedside. Patient is alert, NAD. Denies HA, CP, SOB, cough, N/V, or abd pain.    VITAL SIGNS:  T(C): 37.2 (10-18-18 @ 00:25), Max: 39.2 (10-17-18 @ 03:40)  HR: 79 (10-18-18 @ 00:25) (79 - 114)  BP: 139/81 (10-18-18 @ 00:25) (126/84 - 154/107)  RR: 18 (10-18-18 @ 00:25) (17 - 20)  SpO2: 100% (10-18-18 @ 00:25) (95% - 100%)    PHYSICAL EXAM:  Constitutional: AOx3. NAD.  Neuro:  Grossly intact   Cardiovascular: +S1 S2. RRR.  No LE edema.  Respiratory:  Even, unlabored.  Clear lungs bilaterally. No wheezing, rhonchi, or crackles.  Gastrointestinal: Obese abdomen. +BS X4 active. Soft. Nontender. Nondistended   Extremities/Vascular: +2 pulses bilaterally.   Skin:  warm to touch. +petechial rash on chest    MEDICATIONS:    meropenem  IVPB 2000 milliGRAM(s) IV Intermittent every 8 hours  micafungin IVPB 100 milliGRAM(s) IV Intermittent daily  vancomycin  IVPB 1000 milliGRAM(s) IV Intermittent every 8 hours    LABORATORY:                          x      x     )-----------(        ( 17 Oct 2018 11:51 )             x        UA:   Urinalysis Basic - ( 16 Oct 2018 09:36 )  Color: Yellow / Appearance: Clear / S.017 / pH: x  Gluc: x / Ketone: Negative  / Bili: Negative / Urobili: Negative mg/dL   Blood: x / Protein: 100 mg/dL / Nitrite: Negative   Leuk Esterase: Negative / RBC: 18 /HPF / WBC 1 /HPF   Sq Epi: x / Non Sq Epi: 2 /HPF / Bacteria: Negative    MICROBIOLOGY:   10/16 Urine Culture: No growth  10/16 Blood Culture: No growth to date     RADIOLOGY:  10/16: CT AP IMPRESSION:  Scattered and patchy ground glass opacities in the right upper and lower lobes, possibly infection. Heterogeneous thickening of the endometrial cavity, measuring 1.3 cm, for  which further evaluation with pelvic ultrasound is recommended.   10/16: CTH:  IMPRESSION:  Decreased conspicuity to posterior interhemispheric and left tentorial subdural hematoma compared with the prior 10/11/2018. Right frontal ventricular catheter with its tip in the region of the right foramen of Stephan.    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 Hospital course has been complicated by SDH, neutropenic fevers, transaminitis and upper GIB. Acutely presenting with recurrent fever.     1) Neutropenic Fever - Recurrent   - Tylenol / Cooling measures prn for Pyrexia  > c/w Current Abx/ Antifungal/ IVF regimen   > F/U Primary  care team in NICOLE Gonzalez PA-C  Department of Medicine  Spectra [id number] MEDICINE PA     Notified by RN patient with temperature 101.5. Seen and examined patient at bedside. Patient is alert, NAD. Endorses no new symptoms at this time. Denies HA, CP, SOB, cough, N/V, dysuria, diarrhea, hemoptysis, or abd pain.    VITAL SIGNS:  T(C): 37.2 (10-18-18 @ 00:25), Max: 39.2 (10-17-18 @ 03:40)  HR: 79 (10-18-18 @ 00:25) (79 - 114)  BP: 139/81 (10-18-18 @ 00:25) (126/84 - 154/107)  RR: 18 (10-18-18 @ 00:25) (17 - 20)  SpO2: 100% (10-18-18 @ 00:25) (95% - 100%)    PHYSICAL EXAM:  Constitutional: AOx3. NAD.  Neuro:  Grossly intact   Cardiovascular: +S1 S2. RRR.    Respiratory:  Even, unlabored.  Decreased breath sounds b/l bases  Gastrointestinal: Obese abdomen. +BS X4 active. Soft. Nontender. Nondistended   Extremities/Vascular: +2 pulses bilaterally.   Skin:  warm to touch. +petechial rash on chest and lower legs    MEDICATIONS:    meropenem  IVPB 2000 milliGRAM(s) IV Intermittent every 8 hours  micafungin IVPB 100 milliGRAM(s) IV Intermittent daily  vancomycin  IVPB 1000 milliGRAM(s) IV Intermittent every 8 hours    LABORATORY:                          x      x     )-----------(        ( 17 Oct 2018 11:51 )             x        UA:   Urinalysis Basic - ( 16 Oct 2018 09:36 )  Color: Yellow / Appearance: Clear / S.017 / pH: x  Gluc: x / Ketone: Negative  / Bili: Negative / Urobili: Negative mg/dL   Blood: x / Protein: 100 mg/dL / Nitrite: Negative   Leuk Esterase: Negative / RBC: 18 /HPF / WBC 1 /HPF   Sq Epi: x / Non Sq Epi: 2 /HPF / Bacteria: Negative    MICROBIOLOGY:   10/16 Urine Culture: No growth  10/16 Blood Culture: No growth to date     RADIOLOGY:  10/16: CT AP IMPRESSION:  Scattered and patchy ground glass opacities in the right upper and lower lobes, possibly infection. Heterogeneous thickening of the endometrial cavity, measuring 1.3 cm, for  which further evaluation with pelvic ultrasound is recommended.   10/16: CTH:  IMPRESSION:  Decreased conspicuity to posterior interhemispheric and left tentorial subdural hematoma compared with the prior 10/11/2018. Right frontal ventricular catheter with its tip in the region of the right foramen of Stephan.    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 Hospital course has been complicated by SDH, neutropenic fevers, transaminitis and upper GIB. Acutely presenting with recurrent fever.     1) Neutropenic Fever - Recurrent   - Tylenol / Cooling measures prn for Pyrexia  - c/w Current Abx/ Antifungal/ IVF regimen     2) Petechial Rash   - likely 2/2 thrombocytopenia   - CBC STAT after platelets transfused to ensure, plt >15 while febrile    Endorsed to primary team in AM. Attending to follow.     Kellee Gonzalez PA-C  Department of Medicine  Spectra [phone]

## 2018-10-17 NOTE — PROGRESS NOTE ADULT - ATTENDING COMMENTS
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 FISH and PCR negative. s/p Masha-Auto on 12/16/16. Pt was on sprycel maintenance. 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  Now getting Inotuzumab C1 day 9.    -Febrile. BCx/UCx negative 10/13. Cont Cefepime/mycamine, started on vancomycin.  Plan for a CT of her H/C/A/P.    - cont to keep plt ct >80K, keep hb >7.    - cont keppra px.  Repeat head CT stable 10/11.  Repeat head CT- f/u neurosurgery regarding plt threshold and when can we tap her ommaya.    - GI bleed has resolved, continue po protonix.  - OOB, ambulation. 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 FISH and PCR negative. s/p Masha-Auto on 12/16/16. Pt was on sprycel maintenance. 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  Now getting Inotuzumab C1 day 10.    -Febrile. BCx/UCx negative 10/13, 10/16.  CT c/a/p 10/16- Scattered and patchy groundglass opacities in the right upper and lower lobes, possibly infection.   Heterogeneous thickening of the endometrial cavity.  Change to meropenem today, continue vancomycin/mycamine.    - cont to keep plt ct >80K, keep hb >7.    - cont keppra px.  Repeat head CT stable 10/11.  No ommaya tap until her plt >50.   - GI bleed has resolved, continue po protonix.  - OOB, ambulation.

## 2018-10-17 NOTE — CHART NOTE - NSCHARTNOTEFT_GEN_A_CORE
Pt seen for nutrition follow up. 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 Pt enjoys ensure supplements and will take 1-2 daily, pt will supplement also with milkshakes through [employer] program and snack on peanut butter with crackers and string cheese      Current Weight: 251.1 lbs (10/5), 261.9 lbs (10/11), 255.2 lbs (10/17) weight stable  % Weight Change    Pertinent Medications: MEDICATIONS  (STANDING):  influenza   Vaccine 0.5 milliLiter(s) IntraMuscular once  levETIRAcetam 500 milliGRAM(s) Oral two times a day  meropenem  IVPB      meropenem  IVPB 2000 milliGRAM(s) IV Intermittent every 8 hours  micafungin IVPB 100 milliGRAM(s) IV Intermittent daily  multivitamin 1 Tablet(s) Oral daily  pantoprazole    Tablet 40 milliGRAM(s) Oral before breakfast  potassium acid phosphate/sodium acid phosphate tablet (K-PHOS No. 2) 1 Tablet(s) Oral four times a day with meals  sodium chloride 0.9% lock flush 20 milliLiter(s) IV Push once  sodium chloride 0.9%. 1000 milliLiter(s) (50 mL/Hr) IV Continuous <Continuous>  vancomycin  IVPB 1000 milliGRAM(s) IV Intermittent every 12 hours    MEDICATIONS  (PRN):  acetaminophen   Tablet .. 650 milliGRAM(s) Oral every 6 hours PRN Mild Pain (1 - 3), Moderate Pain (4 - 6)  acetaminophen   Tablet .. 650 milliGRAM(s) Oral every 6 hours PRN Temp greater or equal to 38C (100.4F)  HYDROmorphone  Injectable 0.5 milliGRAM(s) IV Push every 2 hours PRN Severe Pain (7 - 10)  HYDROmorphone  Injectable 1 milliGRAM(s) IV Push every 4 hours PRN Moderate Pain (4 - 6)  metoclopramide Injectable 10 milliGRAM(s) IV Push every 6 hours PRN nausea  sodium chloride 0.9% lock flush 10 milliLiter(s) IV Push every 1 hour PRN After each medication administration  sodium chloride 0.9% lock flush 10 milliLiter(s) IV Push every 12 hours PRN Lumen of catheter NOT used    Pertinent Labs:  10-17 Na137 mmol/L Glu 126 mg/dL<H> K+ 3.7 mmol/L Cr  0.75 mg/dL BUN 8 mg/dL 10-17 Phos 1.9 mg/dL<L> 10-17 Alb 3.7 g/dL 10-05 [id number] 5.6 %      Skin: No edema, skin intact    Estimated Needs:   [ x] no change since previous assessment  [ ] recalculated:       Previous Nutrition Diagnosis:     [ x] Inadequate Oral Intake-ongoing addressed with supplements  [x] Class 3 obesity-education not appropriate at this time given acute medical course      New Nutrition Diagnosis: [x ] not applicable       Interventions:     Recommend    1. Continue Regular diet with ensure supplements as ordered  2. Continue to encourage po intake and obtain/honor food preferences as able, reviewed importance of adequate po intake with overall goal for weight maintenance, consuming smaller, more frequent meals with emphasis on foods with protein        Monitoring and Evaluation:     [x ] PO intake [ x] Tolerance to diet prescription [ x] weights [x ] follow up per protocol    [ ] other:

## 2018-10-17 NOTE — PROGRESS NOTE ADULT - PROBLEM SELECTOR PLAN 5
No pharmacologic ppx 2/2 thrombocytopenia and SDH mild, improved today  likely 2/2 to Inotuzamab  Monitor daily CMP  Avoid hepatotoxic medications

## 2018-10-17 NOTE — PROGRESS NOTE ADULT - PROBLEM SELECTOR PLAN 4
mild  likely 2/2 to Inotuzamab  Monitor daily CMP  Avoid hepatotoxic medications mild, improved today  likely 2/2 to Inotuzamab  Monitor daily CMP  Avoid hepatotoxic medications With no vision changes  Monitor closely   Keep PLT >20K if possible

## 2018-10-17 NOTE — PROGRESS NOTE ADULT - SUBJECTIVE AND OBJECTIVE BOX
Diagnosis: Relapsed ALL Ph (+)     Protocol/Chemo Regimen: [place] Day 1,8 and 15    Day: 10    Pt endorsed: Headache stable with pain medications    Review of Systems: Patient denies dizziness, visual changes, chest pain, palpitations, SOB, abdominal pain, vomiting, diarrhea or dysuria.     Pain scale: denies now     Diet: Regular    Allergies; No Known Drug Allergies  shellfish (Nausea; Vomiting)      ANTIMICROBIALS  cefepime   IVPB 2000 milliGRAM(s) IV Intermittent every 8 hours  micafungin IVPB 100 milliGRAM(s) IV Intermittent daily  vancomycin  IVPB 1000 milliGRAM(s) IV Intermittent every 12 hours      STANDING MEDICATIONS  allopurinol 300 milliGRAM(s) Oral daily  influenza   Vaccine 0.5 milliLiter(s) IntraMuscular once  levETIRAcetam 500 milliGRAM(s) Oral two times a day  multivitamin 1 Tablet(s) Oral daily  pantoprazole    Tablet 40 milliGRAM(s) Oral before breakfast  sodium chloride 0.9% lock flush 20 milliLiter(s) IV Push once  sodium chloride 0.9%. 1000 milliLiter(s) IV Continuous <Continuous>      PRN MEDICATIONS  acetaminophen   Tablet .. 650 milliGRAM(s) Oral every 6 hours PRN  acetaminophen   Tablet .. 650 milliGRAM(s) Oral every 6 hours PRN  HYDROmorphone  Injectable 0.5 milliGRAM(s) IV Push every 2 hours PRN  HYDROmorphone  Injectable 1 milliGRAM(s) IV Push every 4 hours PRN  metoclopramide Injectable 10 milliGRAM(s) IV Push every 6 hours PRN  sodium chloride 0.9% lock flush 10 milliLiter(s) IV Push every 1 hour PRN  sodium chloride 0.9% lock flush 10 milliLiter(s) IV Push every 12 hours PRN      Vital Signs Last 24 Hrs  T(C): 37.9 (17 Oct 2018 05:44), Max: 39.2 (17 Oct 2018 03:40)  T(F): 100.3 (17 Oct 2018 05:44), Max: 102.6 (17 Oct 2018 03:40)  HR: 95 (17 Oct 2018 05:44) (88 - 111)  BP: 137/74 (17 Oct 2018 05:44) (136/83 - 155/97)  BP(mean): --  RR: 18 (17 Oct 2018 05:44) (18 - 18)  SpO2: 95% (17 Oct 2018 05:44) (95% - 100%)      PHYSICAL EXAM  General: NAD  HEENT: PERRLA, EOMI, clear oropharynx, anicteric sclera, pink conjunctiva  Neck: supple  CV: (+) S1/S2 tachy/regular  Lungs: clear to auscultation, no wheezes or rales  Abdomen: soft, non-tender, non-distended (+) BS  Ext: no clubbing, cyanosis or edema  Skin: no rashes and no petechiae  Neuro: alert and oriented X 3, no focal deficits  Central Line: C/D/I         LABS:                 RECENT CULTURES:    Culture - Urine (10.13.18 @ 03:01)    Specimen Source: .Urine Clean Catch (Midstream)    Culture Results:   No growth    Culture - Blood (10.13.18 @ 02:31)    Specimen Source: .Blood Blood-Peripheral    Culture Results:   No growth to date.      RADIOLOGY & ADDITIONAL STUDIES:    EXAM:  CT BRAIN                        PROCEDURE DATE:  10/11/2018    IMPRESSION: Small residual hypodense left frontoparietal subdural collection, without   significant change in size.   Unchanged posterior left parafalcine and left tentorial subdural hematoma.  No new areas of acute intracranial hemorrhage.      EXAM:  XR CHEST PORTABLE URGENT 1V                        PROCEDURE DATE:  10/09/2018    Impression: Low lung volumes without consolidations or edema. No pneumothorax  Right PICC line in expected location      EXAM:  CT BRAIN                        PROCEDURE DATE:  10/06/2018    Impression: No significant change when allowing for differences in   technique.      EXAM:  CT ABDOMEN AND PELVIS                        PROCEDURE DATE:  10/05/2018    IMPRESSION: No evidence of retroperitoneal bleed.  Cholelithiasis. Diagnosis: Relapsed ALL Ph (+)     Protocol/Chemo Regimen: [place] Day 1,8 and 15    Day: 10    Pt endorsed: Headache stable with pain medications    Review of Systems: Patient denies dizziness, visual changes, chest pain, palpitations, SOB, abdominal pain, vomiting, diarrhea or dysuria.     Pain scale: denies now     Diet: Regular    Allergies; No Known Drug Allergies  shellfish (Nausea; Vomiting)      ANTIMICROBIALS  cefepime   IVPB 2000 milliGRAM(s) IV Intermittent every 8 hours  micafungin IVPB 100 milliGRAM(s) IV Intermittent daily  vancomycin  IVPB 1000 milliGRAM(s) IV Intermittent every 12 hours      STANDING MEDICATIONS  allopurinol 300 milliGRAM(s) Oral daily  influenza   Vaccine 0.5 milliLiter(s) IntraMuscular once  levETIRAcetam 500 milliGRAM(s) Oral two times a day  multivitamin 1 Tablet(s) Oral daily  pantoprazole    Tablet 40 milliGRAM(s) Oral before breakfast  sodium chloride 0.9% lock flush 20 milliLiter(s) IV Push once  sodium chloride 0.9%. 1000 milliLiter(s) IV Continuous <Continuous>      PRN MEDICATIONS  acetaminophen   Tablet .. 650 milliGRAM(s) Oral every 6 hours PRN  acetaminophen   Tablet .. 650 milliGRAM(s) Oral every 6 hours PRN  HYDROmorphone  Injectable 0.5 milliGRAM(s) IV Push every 2 hours PRN  HYDROmorphone  Injectable 1 milliGRAM(s) IV Push every 4 hours PRN  metoclopramide Injectable 10 milliGRAM(s) IV Push every 6 hours PRN  sodium chloride 0.9% lock flush 10 milliLiter(s) IV Push every 1 hour PRN  sodium chloride 0.9% lock flush 10 milliLiter(s) IV Push every 12 hours PRN      Vital Signs Last 24 Hrs  T(C): 37.9 (17 Oct 2018 05:44), Max: 39.2 (17 Oct 2018 03:40)  T(F): 100.3 (17 Oct 2018 05:44), Max: 102.6 (17 Oct 2018 03:40)  HR: 95 (17 Oct 2018 05:44) (88 - 111)  BP: 137/74 (17 Oct 2018 05:44) (136/83 - 155/97)  BP(mean): --  RR: 18 (17 Oct 2018 05:44) (18 - 18)  SpO2: 95% (17 Oct 2018 05:44) (95% - 100%)      PHYSICAL EXAM  General: NAD  HEENT: PERRLA, EOMI, clear oropharynx, anicteric sclera, pink conjunctiva  Neck: supple  CV: (+) S1/S2 tachy/regular  Lungs: clear to auscultation, no wheezes or rales  Abdomen: soft, non-tender, non-distended (+) BS  Ext: no clubbing, cyanosis or edema  Skin: no rashes and no petechiae  Neuro: alert and oriented X 3, no focal deficits  Central Line: C/D/I         LABS:                          8.2    0.2   )-----------( x        ( 17 Oct 2018 07:01 )             22.5         Mean Cell Volume : 92.5 fl  Mean Cell Hemoglobin : 33.7 pg  Mean Cell Hemoglobin Concentration : 36.4 gm/dL  Auto Neutrophil # : x  Auto Lymphocyte # : x  Auto Monocyte # : x  Auto Eosinophil # : x  Auto Basophil # : x  Auto Neutrophil % : x  Auto Lymphocyte % : x  Auto Monocyte % : x  Auto Eosinophil % : x  Auto Basophil % : x      10-17    137  |  102  |  8   ----------------------------<  126<H>  3.7   |  23  |  0.75    Ca    9.2      17 Oct 2018 07:01  Phos  1.9     10-17  Mg     2.0     10-17    TPro  6.3  /  Alb  3.7  /  TBili  0.7  /  DBili  x   /  AST  25  /  ALT  54<H>  /  AlkPhos  193<H>  10-17      Mg 2.0  Phos 1.9        Uric Acid 1.4      RECENT CULTURES:    Culture - Urine (10.13.18 @ 03:01)    Specimen Source: .Urine Clean Catch (Midstream)    Culture Results:   No growth    Culture - Blood (10.13.18 @ 02:31)    Specimen Source: .Blood Blood-Peripheral    Culture Results:   No growth to date.      RADIOLOGY & ADDITIONAL STUDIES:    EXAM:  CT BRAIN                        PROCEDURE DATE:  10/11/2018    IMPRESSION: Small residual hypodense left frontoparietal subdural collection, without   significant change in size.   Unchanged posterior left parafalcine and left tentorial subdural hematoma.  No new areas of acute intracranial hemorrhage.      EXAM:  XR CHEST PORTABLE URGENT 1V                        PROCEDURE DATE:  10/09/2018    Impression: Low lung volumes without consolidations or edema. No pneumothorax  Right PICC line in expected location      EXAM:  CT BRAIN                        PROCEDURE DATE:  10/06/2018    Impression: No significant change when allowing for differences in   technique.      EXAM:  CT ABDOMEN AND PELVIS                        PROCEDURE DATE:  10/05/2018    IMPRESSION: No evidence of retroperitoneal bleed.  Cholelithiasis. Diagnosis: Relapsed ALL Ph (+)     Protocol/Chemo Regimen: [place] Day 1,8 and 15    Day: 10    Pt endorsed: Headache stable with pain medications    Review of Systems: Patient denies dizziness, visual changes, chest pain, palpitations, SOB, abdominal pain, vomiting, diarrhea or dysuria.     Pain scale: denies now     Diet: Regular    Allergies; No Known Drug Allergies  shellfish (Nausea; Vomiting)      ANTIMICROBIALS  cefepime   IVPB 2000 milliGRAM(s) IV Intermittent every 8 hours  micafungin IVPB 100 milliGRAM(s) IV Intermittent daily  vancomycin  IVPB 1000 milliGRAM(s) IV Intermittent every 12 hours      STANDING MEDICATIONS  allopurinol 300 milliGRAM(s) Oral daily  influenza   Vaccine 0.5 milliLiter(s) IntraMuscular once  levETIRAcetam 500 milliGRAM(s) Oral two times a day  multivitamin 1 Tablet(s) Oral daily  pantoprazole    Tablet 40 milliGRAM(s) Oral before breakfast  sodium chloride 0.9% lock flush 20 milliLiter(s) IV Push once  sodium chloride 0.9%. 1000 milliLiter(s) IV Continuous <Continuous>      PRN MEDICATIONS  acetaminophen   Tablet .. 650 milliGRAM(s) Oral every 6 hours PRN  acetaminophen   Tablet .. 650 milliGRAM(s) Oral every 6 hours PRN  HYDROmorphone  Injectable 0.5 milliGRAM(s) IV Push every 2 hours PRN  HYDROmorphone  Injectable 1 milliGRAM(s) IV Push every 4 hours PRN  metoclopramide Injectable 10 milliGRAM(s) IV Push every 6 hours PRN  sodium chloride 0.9% lock flush 10 milliLiter(s) IV Push every 1 hour PRN  sodium chloride 0.9% lock flush 10 milliLiter(s) IV Push every 12 hours PRN      Vital Signs Last 24 Hrs  T(C): 37.9 (17 Oct 2018 05:44), Max: 39.2 (17 Oct 2018 03:40)  T(F): 100.3 (17 Oct 2018 05:44), Max: 102.6 (17 Oct 2018 03:40)  HR: 95 (17 Oct 2018 05:44) (88 - 111)  BP: 137/74 (17 Oct 2018 05:44) (136/83 - 155/97)  BP(mean): --  RR: 18 (17 Oct 2018 05:44) (18 - 18)  SpO2: 95% (17 Oct 2018 05:44) (95% - 100%)      PHYSICAL EXAM  General: NAD  HEENT: PERRLA, EOMI, clear oropharynx, anicteric sclera, pink conjunctiva  Neck: supple  CV: (+) S1/S2 tachy/regular  Lungs: clear to auscultation, no wheezes or rales  Abdomen: soft, non-tender, non-distended (+) BS  Ext: no clubbing, cyanosis or edema  Skin: no rashes and no petechiae  Neuro: alert and oriented X 3, no focal deficits  Central Line: C/D/I         LABS:                          8.2    0.2   )-----------( 3        ( 17 Oct 2018 07:01 )             22.5         Mean Cell Volume : 92.5 fl  Mean Cell Hemoglobin : 33.7 pg  Mean Cell Hemoglobin Concentration : 36.4 gm/dL  Auto Neutrophil # : x  Auto Lymphocyte # : x  Auto Monocyte # : x  Auto Eosinophil # : x  Auto Basophil # : x  Auto Neutrophil % : x  Auto Lymphocyte % : x  Auto Monocyte % : x  Auto Eosinophil % : x  Auto Basophil % : x      10-17    137  |  102  |  8   ----------------------------<  126<H>  3.7   |  23  |  0.75    Ca    9.2      17 Oct 2018 07:01  Phos  1.9     10-17  Mg     2.0     10-17    TPro  6.3  /  Alb  3.7  /  TBili  0.7  /  DBili  x   /  AST  25  /  ALT  54<H>  /  AlkPhos  193<H>  10-17      Mg 2.0  Phos 1.9        Uric Acid 1.4      RECENT CULTURES:    Culture - Urine (10.13.18 @ 03:01)    Specimen Source: .Urine Clean Catch (Midstream)    Culture Results:   No growth    Culture - Blood (10.13.18 @ 02:31)    Specimen Source: .Blood Blood-Peripheral    Culture Results:   No growth to date.      RADIOLOGY & ADDITIONAL STUDIES:    EXAM:  CT BRAIN                        PROCEDURE DATE:  10/11/2018    IMPRESSION: Small residual hypodense left frontoparietal subdural collection, without   significant change in size.   Unchanged posterior left parafalcine and left tentorial subdural hematoma.  No new areas of acute intracranial hemorrhage.      EXAM:  XR CHEST PORTABLE URGENT 1V                        PROCEDURE DATE:  10/09/2018    Impression: Low lung volumes without consolidations or edema. No pneumothorax  Right PICC line in expected location      EXAM:  CT BRAIN                        PROCEDURE DATE:  10/06/2018    Impression: No significant change when allowing for differences in   technique.      EXAM:  CT ABDOMEN AND PELVIS                        PROCEDURE DATE:  10/05/2018    IMPRESSION: No evidence of retroperitoneal bleed.  Cholelithiasis. Diagnosis: Relapsed ALL Ph (+)     Protocol/Chemo Regimen: [place] Day 1,8 and 15    Day: 10    Pt endorsed: Headache stable with pain medications    Review of Systems: Patient denies dizziness, visual changes, chest pain, palpitations, SOB, abdominal pain, vomiting, diarrhea or dysuria.     Pain scale: denies now     Diet: Regular    Allergies; No Known Drug Allergies  shellfish (Nausea; Vomiting)      ANTIMICROBIALS  cefepime   IVPB 2000 milliGRAM(s) IV Intermittent every 8 hours  micafungin IVPB 100 milliGRAM(s) IV Intermittent daily  vancomycin  IVPB 1000 milliGRAM(s) IV Intermittent every 12 hours      STANDING MEDICATIONS  allopurinol 300 milliGRAM(s) Oral daily  influenza   Vaccine 0.5 milliLiter(s) IntraMuscular once  levETIRAcetam 500 milliGRAM(s) Oral two times a day  multivitamin 1 Tablet(s) Oral daily  pantoprazole    Tablet 40 milliGRAM(s) Oral before breakfast  sodium chloride 0.9% lock flush 20 milliLiter(s) IV Push once  sodium chloride 0.9%. 1000 milliLiter(s) IV Continuous <Continuous>      PRN MEDICATIONS  acetaminophen   Tablet .. 650 milliGRAM(s) Oral every 6 hours PRN  acetaminophen   Tablet .. 650 milliGRAM(s) Oral every 6 hours PRN  HYDROmorphone  Injectable 0.5 milliGRAM(s) IV Push every 2 hours PRN  HYDROmorphone  Injectable 1 milliGRAM(s) IV Push every 4 hours PRN  metoclopramide Injectable 10 milliGRAM(s) IV Push every 6 hours PRN  sodium chloride 0.9% lock flush 10 milliLiter(s) IV Push every 1 hour PRN  sodium chloride 0.9% lock flush 10 milliLiter(s) IV Push every 12 hours PRN      Vital Signs Last 24 Hrs  T(C): 37.9 (17 Oct 2018 05:44), Max: 39.2 (17 Oct 2018 03:40)  T(F): 100.3 (17 Oct 2018 05:44), Max: 102.6 (17 Oct 2018 03:40)  HR: 95 (17 Oct 2018 05:44) (88 - 111)  BP: 137/74 (17 Oct 2018 05:44) (136/83 - 155/97)  BP(mean): --  RR: 18 (17 Oct 2018 05:44) (18 - 18)  SpO2: 95% (17 Oct 2018 05:44) (95% - 100%)      PHYSICAL EXAM  General: NAD  HEENT: PERRLA, EOMI, right subconjunctival hemorrhage, clear oropharynx, anicteric sclera, pink conjunctiva  Neck: supple  CV: (+) S1/S2 tachy/regular  Lungs: clear to auscultation, no wheezes or rales  Abdomen: soft, non-tender, non-distended (+) BS  Ext: no clubbing, cyanosis or edema  Skin: no rashes and no petechiae  Neuro: alert and oriented X 3, no focal deficits  Central Line: C/D/I         LABS:                          8.2    0.2   )-----------( 3        ( 17 Oct 2018 07:01 )             22.5         Mean Cell Volume : 92.5 fl  Mean Cell Hemoglobin : 33.7 pg  Mean Cell Hemoglobin Concentration : 36.4 gm/dL  Auto Neutrophil # : x  Auto Lymphocyte # : x  Auto Monocyte # : x  Auto Eosinophil # : x  Auto Basophil # : x  Auto Neutrophil % : x  Auto Lymphocyte % : x  Auto Monocyte % : x  Auto Eosinophil % : x  Auto Basophil % : x      10-17    137  |  102  |  8   ----------------------------<  126<H>  3.7   |  23  |  0.75    Ca    9.2      17 Oct 2018 07:01  Phos  1.9     10-17  Mg     2.0     10-17    TPro  6.3  /  Alb  3.7  /  TBili  0.7  /  DBili  x   /  AST  25  /  ALT  54<H>  /  AlkPhos  193<H>  10-17      Mg 2.0  Phos 1.9        Uric Acid 1.4      RECENT CULTURES:    Culture - Urine (10.13.18 @ 03:01)    Specimen Source: .Urine Clean Catch (Midstream)    Culture Results:   No growth    Culture - Blood (10.13.18 @ 02:31)    Specimen Source: .Blood Blood-Peripheral    Culture Results:   No growth to date.      RADIOLOGY & ADDITIONAL STUDIES:    EXAM:  CT BRAIN                        PROCEDURE DATE:  10/11/2018    IMPRESSION: Small residual hypodense left frontoparietal subdural collection, without   significant change in size.   Unchanged posterior left parafalcine and left tentorial subdural hematoma.  No new areas of acute intracranial hemorrhage.      EXAM:  XR CHEST PORTABLE URGENT 1V                        PROCEDURE DATE:  10/09/2018    Impression: Low lung volumes without consolidations or edema. No pneumothorax  Right PICC line in expected location      EXAM:  CT BRAIN                        PROCEDURE DATE:  10/06/2018    Impression: No significant change when allowing for differences in   technique.      EXAM:  CT ABDOMEN AND PELVIS                        PROCEDURE DATE:  10/05/2018    IMPRESSION: No evidence of retroperitoneal bleed.  Cholelithiasis. Diagnosis: Relapsed ALL Ph (+)     Protocol/Chemo Regimen: [place] Day 1,8 and 15    Day: 10    Pt endorsed: Headache stable with pain medications    Review of Systems: Patient denies dizziness, visual changes, chest pain, palpitations, SOB, abdominal pain, vomiting, diarrhea or dysuria.     Pain scale: denies now     Diet: Regular    Allergies; No Known Drug Allergies  shellfish (Nausea; Vomiting)      ANTIMICROBIALS  cefepime   IVPB 2000 milliGRAM(s) IV Intermittent every 8 hours  micafungin IVPB 100 milliGRAM(s) IV Intermittent daily  vancomycin  IVPB 1000 milliGRAM(s) IV Intermittent every 12 hours      STANDING MEDICATIONS  allopurinol 300 milliGRAM(s) Oral daily  influenza   Vaccine 0.5 milliLiter(s) IntraMuscular once  levETIRAcetam 500 milliGRAM(s) Oral two times a day  multivitamin 1 Tablet(s) Oral daily  pantoprazole    Tablet 40 milliGRAM(s) Oral before breakfast  sodium chloride 0.9% lock flush 20 milliLiter(s) IV Push once  sodium chloride 0.9%. 1000 milliLiter(s) IV Continuous <Continuous>      PRN MEDICATIONS  acetaminophen   Tablet .. 650 milliGRAM(s) Oral every 6 hours PRN  acetaminophen   Tablet .. 650 milliGRAM(s) Oral every 6 hours PRN  HYDROmorphone  Injectable 0.5 milliGRAM(s) IV Push every 2 hours PRN  HYDROmorphone  Injectable 1 milliGRAM(s) IV Push every 4 hours PRN  metoclopramide Injectable 10 milliGRAM(s) IV Push every 6 hours PRN  sodium chloride 0.9% lock flush 10 milliLiter(s) IV Push every 1 hour PRN  sodium chloride 0.9% lock flush 10 milliLiter(s) IV Push every 12 hours PRN      Vital Signs Last 24 Hrs  T(C): 37.9 (17 Oct 2018 05:44), Max: 39.2 (17 Oct 2018 03:40)  T(F): 100.3 (17 Oct 2018 05:44), Max: 102.6 (17 Oct 2018 03:40)  HR: 95 (17 Oct 2018 05:44) (88 - 111)  BP: 137/74 (17 Oct 2018 05:44) (136/83 - 155/97)  BP(mean): --  RR: 18 (17 Oct 2018 05:44) (18 - 18)  SpO2: 95% (17 Oct 2018 05:44) (95% - 100%)      PHYSICAL EXAM  General: NAD  HEENT: PERRLA, EOMI, right subconjunctival hemorrhage, clear oropharynx, anicteric sclera, pink conjunctiva  Neck: supple  CV: (+) S1/S2 tachy/regular  Lungs: clear to auscultation, no wheezes or rales  Abdomen: soft, non-tender, non-distended (+) BS  Ext: no clubbing, cyanosis or edema  Skin: no rashes and no petechiae  Neuro: alert and oriented X 3, no focal deficits  Central Line: C/D/I         LABS:                          8.2    0.2   )-----------( 3        ( 17 Oct 2018 07:01 )             22.5         Mean Cell Volume : 92.5 fl  Mean Cell Hemoglobin : 33.7 pg  Mean Cell Hemoglobin Concentration : 36.4 gm/dL  Auto Neutrophil # : x  Auto Lymphocyte # : x  Auto Monocyte # : x  Auto Eosinophil # : x  Auto Basophil # : x  Auto Neutrophil % : x  Auto Lymphocyte % : x  Auto Monocyte % : x  Auto Eosinophil % : x  Auto Basophil % : x      10-17    137  |  102  |  8   ----------------------------<  126<H>  3.7   |  23  |  0.75    Ca    9.2      17 Oct 2018 07:01  Phos  1.9     10-17  Mg     2.0     10-17    TPro  6.3  /  Alb  3.7  /  TBili  0.7  /  DBili  x   /  AST  25  /  ALT  54<H>  /  AlkPhos  193<H>  10-17      Mg 2.0  Phos 1.9        Uric Acid 1.4      RECENT CULTURES:    Culture - Urine (10.13.18 @ 03:01)    Specimen Source: .Urine Clean Catch (Midstream)    Culture Results:   No growth    Culture - Blood (10.13.18 @ 02:31)    Specimen Source: .Blood Blood-Peripheral    Culture Results:   No growth to date.      RADIOLOGY & ADDITIONAL STUDIES:      EXAM:  CT ABDOMEN AND PELVIS OC IC/ CT CHEST IC                        PROCEDURE DATE:  10/16/2018    IMPRESSION: Scattered and patchy groundglass opacities in the right upper and lower   lobes, possibly infection.   Heterogeneous thickening of the endometrial cavity, measuring 1.3 cm, for   which further evaluation with pelvic ultrasound is recommended.      EXAM:  CT BRAIN                        PROCEDURE DATE:  10/16/2018    IMPRESSION:  Decreased conspicuity to posterior interhemispheric and left   tentorial subdural hematoma compared with the prior 10/11/2018. Right   frontal ventricular catheter with its tip in the region of the right   foramen of Stephan.      EXAM:  CT BRAIN                        PROCEDURE DATE:  10/11/2018    IMPRESSION: Small residual hypodense left frontoparietal subdural collection, without   significant change in size.   Unchanged posterior left parafalcine and left tentorial subdural hematoma.  No new areas of acute intracranial hemorrhage.      EXAM:  XR CHEST PORTABLE URGENT 1V                        PROCEDURE DATE:  10/09/2018    Impression: Low lung volumes without consolidations or edema. No pneumothorax  Right PICC line in expected location      EXAM:  CT BRAIN                        PROCEDURE DATE:  10/06/2018    Impression: No significant change when allowing for differences in   technique.      EXAM:  CT ABDOMEN AND PELVIS                        PROCEDURE DATE:  10/05/2018    IMPRESSION: No evidence of retroperitoneal bleed.  Cholelithiasis. Diagnosis: Relapsed ALL Ph (+)     Protocol/Chemo Regimen: [place] Day 1,8 and 15    Day: 10    Pt endorsed: Headache stable with pain medications    Review of Systems: Patient denies dizziness, visual changes, chest pain, palpitations, SOB, abdominal pain, vomiting, diarrhea or dysuria.     Pain scale: denies now     Diet: Regular    Allergies; No Known Drug Allergies  shellfish (Nausea; Vomiting)      ANTIMICROBIALS  cefepime   IVPB 2000 milliGRAM(s) IV Intermittent every 8 hours  micafungin IVPB 100 milliGRAM(s) IV Intermittent daily  vancomycin  IVPB 1000 milliGRAM(s) IV Intermittent every 12 hours      STANDING MEDICATIONS  allopurinol 300 milliGRAM(s) Oral daily  influenza   Vaccine 0.5 milliLiter(s) IntraMuscular once  levETIRAcetam 500 milliGRAM(s) Oral two times a day  multivitamin 1 Tablet(s) Oral daily  pantoprazole    Tablet 40 milliGRAM(s) Oral before breakfast  sodium chloride 0.9% lock flush 20 milliLiter(s) IV Push once  sodium chloride 0.9%. 1000 milliLiter(s) IV Continuous <Continuous>      PRN MEDICATIONS  acetaminophen   Tablet .. 650 milliGRAM(s) Oral every 6 hours PRN  acetaminophen   Tablet .. 650 milliGRAM(s) Oral every 6 hours PRN  HYDROmorphone  Injectable 0.5 milliGRAM(s) IV Push every 2 hours PRN  HYDROmorphone  Injectable 1 milliGRAM(s) IV Push every 4 hours PRN  metoclopramide Injectable 10 milliGRAM(s) IV Push every 6 hours PRN  sodium chloride 0.9% lock flush 10 milliLiter(s) IV Push every 1 hour PRN  sodium chloride 0.9% lock flush 10 milliLiter(s) IV Push every 12 hours PRN      Vital Signs Last 24 Hrs  T(C): 37.9 (17 Oct 2018 05:44), Max: 39.2 (17 Oct 2018 03:40)  T(F): 100.3 (17 Oct 2018 05:44), Max: 102.6 (17 Oct 2018 03:40)  HR: 95 (17 Oct 2018 05:44) (88 - 111)  BP: 137/74 (17 Oct 2018 05:44) (136/83 - 155/97)  BP(mean): --  RR: 18 (17 Oct 2018 05:44) (18 - 18)  SpO2: 95% (17 Oct 2018 05:44) (95% - 100%)      PHYSICAL EXAM  General: NAD  HEENT: PERRLA, EOMI, right subconjunctival hemorrhage, clear oropharynx, anicteric sclera, pink conjunctiva  Neck: supple  CV: (+) S1/S2 tachy/regular  Lungs: clear to auscultation, no wheezes or rales  Abdomen: soft, non-tender, non-distended (+) BS  Ext: no clubbing, cyanosis or edema  Skin: no rashes. Generalized ecchymosis  Neuro: alert and oriented X 3, no focal deficits  Central Line: C/D/I         LABS:                          8.2    0.2   )-----------( 3        ( 17 Oct 2018 07:01 )             22.5         Mean Cell Volume : 92.5 fl  Mean Cell Hemoglobin : 33.7 pg  Mean Cell Hemoglobin Concentration : 36.4 gm/dL  Auto Neutrophil # : x  Auto Lymphocyte # : x  Auto Monocyte # : x  Auto Eosinophil # : x  Auto Basophil # : x  Auto Neutrophil % : x  Auto Lymphocyte % : x  Auto Monocyte % : x  Auto Eosinophil % : x  Auto Basophil % : x      10-17    137  |  102  |  8   ----------------------------<  126<H>  3.7   |  23  |  0.75    Ca    9.2      17 Oct 2018 07:01  Phos  1.9     10-17  Mg     2.0     10-17    TPro  6.3  /  Alb  3.7  /  TBili  0.7  /  DBili  x   /  AST  25  /  ALT  54<H>  /  AlkPhos  193<H>  10-17      Mg 2.0  Phos 1.9        Uric Acid 1.4      RECENT CULTURES:    Culture - Urine (10.13.18 @ 03:01)    Specimen Source: .Urine Clean Catch (Midstream)    Culture Results:   No growth    Culture - Blood (10.13.18 @ 02:31)    Specimen Source: .Blood Blood-Peripheral    Culture Results:   No growth to date.      RADIOLOGY & ADDITIONAL STUDIES:      EXAM:  CT ABDOMEN AND PELVIS OC IC/ CT CHEST IC                        PROCEDURE DATE:  10/16/2018    IMPRESSION: Scattered and patchy groundglass opacities in the right upper and lower   lobes, possibly infection.   Heterogeneous thickening of the endometrial cavity, measuring 1.3 cm, for   which further evaluation with pelvic ultrasound is recommended.      EXAM:  CT BRAIN                        PROCEDURE DATE:  10/16/2018    IMPRESSION:  Decreased conspicuity to posterior interhemispheric and left   tentorial subdural hematoma compared with the prior 10/11/2018. Right   frontal ventricular catheter with its tip in the region of the right   foramen of Stephan.      EXAM:  CT BRAIN                        PROCEDURE DATE:  10/11/2018    IMPRESSION: Small residual hypodense left frontoparietal subdural collection, without   significant change in size.   Unchanged posterior left parafalcine and left tentorial subdural hematoma.  No new areas of acute intracranial hemorrhage.      EXAM:  XR CHEST PORTABLE URGENT 1V                        PROCEDURE DATE:  10/09/2018    Impression: Low lung volumes without consolidations or edema. No pneumothorax  Right PICC line in expected location      EXAM:  CT BRAIN                        PROCEDURE DATE:  10/06/2018    Impression: No significant change when allowing for differences in   technique.      EXAM:  CT ABDOMEN AND PELVIS                        PROCEDURE DATE:  10/05/2018    IMPRESSION: No evidence of retroperitoneal bleed.  Cholelithiasis.

## 2018-10-18 LAB
ALBUMIN SERPL ELPH-MCNC: 3.3 G/DL — SIGNIFICANT CHANGE UP (ref 3.3–5)
ALP SERPL-CCNC: 190 U/L — HIGH (ref 40–120)
ALT FLD-CCNC: 47 U/L — HIGH (ref 10–45)
ANION GAP SERPL CALC-SCNC: 13 MMOL/L — SIGNIFICANT CHANGE UP (ref 5–17)
APTT BLD: 28.4 SEC — SIGNIFICANT CHANGE UP (ref 27.5–37.4)
AST SERPL-CCNC: 16 U/L — SIGNIFICANT CHANGE UP (ref 10–40)
BILIRUB SERPL-MCNC: 0.7 MG/DL — SIGNIFICANT CHANGE UP (ref 0.2–1.2)
BUN SERPL-MCNC: 9 MG/DL — SIGNIFICANT CHANGE UP (ref 7–23)
CALCIUM SERPL-MCNC: 9 MG/DL — SIGNIFICANT CHANGE UP (ref 8.4–10.5)
CHLORIDE SERPL-SCNC: 105 MMOL/L — SIGNIFICANT CHANGE UP (ref 96–108)
CO2 SERPL-SCNC: 24 MMOL/L — SIGNIFICANT CHANGE UP (ref 22–31)
CREAT SERPL-MCNC: 0.7 MG/DL — SIGNIFICANT CHANGE UP (ref 0.5–1.3)
CULTURE RESULTS: SIGNIFICANT CHANGE UP
FIBRINOGEN PPP-MCNC: 790 MG/DL — HIGH (ref 310–510)
GLUCOSE SERPL-MCNC: 97 MG/DL — SIGNIFICANT CHANGE UP (ref 70–99)
HCT VFR BLD CALC: 23.2 % — LOW (ref 34.5–45)
HGB BLD-MCNC: 7.6 G/DL — LOW (ref 11.5–15.5)
INR BLD: 1 RATIO — SIGNIFICANT CHANGE UP (ref 0.88–1.16)
LDH SERPL L TO P-CCNC: 295 U/L — HIGH (ref 50–242)
MAGNESIUM SERPL-MCNC: 2.1 MG/DL — SIGNIFICANT CHANGE UP (ref 1.6–2.6)
MCHC RBC-ENTMCNC: 30.4 PG — SIGNIFICANT CHANGE UP (ref 27–34)
MCHC RBC-ENTMCNC: 32.8 GM/DL — SIGNIFICANT CHANGE UP (ref 32–36)
MCV RBC AUTO: 92.6 FL — SIGNIFICANT CHANGE UP (ref 80–100)
PHOSPHATE SERPL-MCNC: 3.2 MG/DL — SIGNIFICANT CHANGE UP (ref 2.5–4.5)
PLATELET # BLD AUTO: <2 K/UL — CRITICAL LOW (ref 150–400)
POTASSIUM SERPL-MCNC: 3.9 MMOL/L — SIGNIFICANT CHANGE UP (ref 3.5–5.3)
POTASSIUM SERPL-SCNC: 3.9 MMOL/L — SIGNIFICANT CHANGE UP (ref 3.5–5.3)
PROT SERPL-MCNC: 6.4 G/DL — SIGNIFICANT CHANGE UP (ref 6–8.3)
PROTHROM AB SERPL-ACNC: 10.8 SEC — SIGNIFICANT CHANGE UP (ref 9.8–12.7)
RBC # BLD: 2.5 M/UL — LOW (ref 3.8–5.2)
RBC # FLD: 15.1 % — HIGH (ref 10.3–14.5)
SODIUM SERPL-SCNC: 142 MMOL/L — SIGNIFICANT CHANGE UP (ref 135–145)
SPECIMEN SOURCE: SIGNIFICANT CHANGE UP
URATE SERPL-MCNC: 1.3 MG/DL — LOW (ref 2.5–7)
WBC # BLD: 0.4 K/UL — CRITICAL LOW (ref 3.8–10.5)
WBC # FLD AUTO: 0.4 K/UL — CRITICAL LOW (ref 3.8–10.5)

## 2018-10-18 PROCEDURE — 99232 SBSQ HOSP IP/OBS MODERATE 35: CPT

## 2018-10-18 RX ORDER — ONDANSETRON 8 MG/1
4 TABLET, FILM COATED ORAL ONCE
Qty: 0 | Refills: 0 | Status: DISCONTINUED | OUTPATIENT
Start: 2018-10-18 | End: 2018-10-18

## 2018-10-18 RX ORDER — DIPHENHYDRAMINE HCL/ZINC ACET 2 %-0.1 %
1 CREAM (GRAM) TOPICAL ONCE
Qty: 0 | Refills: 0 | Status: COMPLETED | OUTPATIENT
Start: 2018-10-18 | End: 2018-10-18

## 2018-10-18 RX ADMIN — Medication 1 APPLICATION(S): at 05:40

## 2018-10-18 RX ADMIN — Medication 250 MILLIGRAM(S): at 22:54

## 2018-10-18 RX ADMIN — MEROPENEM 200 MILLIGRAM(S): 1 INJECTION INTRAVENOUS at 21:43

## 2018-10-18 RX ADMIN — Medication 1 TABLET(S): at 11:21

## 2018-10-18 RX ADMIN — HYDROMORPHONE HYDROCHLORIDE 1 MILLIGRAM(S): 2 INJECTION INTRAMUSCULAR; INTRAVENOUS; SUBCUTANEOUS at 20:43

## 2018-10-18 RX ADMIN — HYDROMORPHONE HYDROCHLORIDE 0.5 MILLIGRAM(S): 2 INJECTION INTRAMUSCULAR; INTRAVENOUS; SUBCUTANEOUS at 16:59

## 2018-10-18 RX ADMIN — HYDROMORPHONE HYDROCHLORIDE 1 MILLIGRAM(S): 2 INJECTION INTRAMUSCULAR; INTRAVENOUS; SUBCUTANEOUS at 12:49

## 2018-10-18 RX ADMIN — HYDROMORPHONE HYDROCHLORIDE 1 MILLIGRAM(S): 2 INJECTION INTRAMUSCULAR; INTRAVENOUS; SUBCUTANEOUS at 01:54

## 2018-10-18 RX ADMIN — HYDROMORPHONE HYDROCHLORIDE 1 MILLIGRAM(S): 2 INJECTION INTRAMUSCULAR; INTRAVENOUS; SUBCUTANEOUS at 05:26

## 2018-10-18 RX ADMIN — HYDROMORPHONE HYDROCHLORIDE 0.5 MILLIGRAM(S): 2 INJECTION INTRAMUSCULAR; INTRAVENOUS; SUBCUTANEOUS at 17:15

## 2018-10-18 RX ADMIN — SODIUM CHLORIDE 50 MILLILITER(S): 9 INJECTION INTRAMUSCULAR; INTRAVENOUS; SUBCUTANEOUS at 05:37

## 2018-10-18 RX ADMIN — PANTOPRAZOLE SODIUM 40 MILLIGRAM(S): 20 TABLET, DELAYED RELEASE ORAL at 05:37

## 2018-10-18 RX ADMIN — MICAFUNGIN SODIUM 105 MILLIGRAM(S): 100 INJECTION, POWDER, LYOPHILIZED, FOR SOLUTION INTRAVENOUS at 11:18

## 2018-10-18 RX ADMIN — HYDROMORPHONE HYDROCHLORIDE 0.5 MILLIGRAM(S): 2 INJECTION INTRAMUSCULAR; INTRAVENOUS; SUBCUTANEOUS at 09:13

## 2018-10-18 RX ADMIN — LEVETIRACETAM 500 MILLIGRAM(S): 250 TABLET, FILM COATED ORAL at 17:01

## 2018-10-18 RX ADMIN — MEROPENEM 200 MILLIGRAM(S): 1 INJECTION INTRAVENOUS at 06:04

## 2018-10-18 RX ADMIN — MEROPENEM 200 MILLIGRAM(S): 1 INJECTION INTRAVENOUS at 13:45

## 2018-10-18 RX ADMIN — Medication 250 MILLIGRAM(S): at 13:45

## 2018-10-18 RX ADMIN — Medication 250 MILLIGRAM(S): at 05:35

## 2018-10-18 RX ADMIN — LEVETIRACETAM 500 MILLIGRAM(S): 250 TABLET, FILM COATED ORAL at 05:37

## 2018-10-18 RX ADMIN — HYDROMORPHONE HYDROCHLORIDE 1 MILLIGRAM(S): 2 INJECTION INTRAMUSCULAR; INTRAVENOUS; SUBCUTANEOUS at 13:05

## 2018-10-18 RX ADMIN — HYDROMORPHONE HYDROCHLORIDE 1 MILLIGRAM(S): 2 INJECTION INTRAMUSCULAR; INTRAVENOUS; SUBCUTANEOUS at 02:24

## 2018-10-18 RX ADMIN — HYDROMORPHONE HYDROCHLORIDE 0.5 MILLIGRAM(S): 2 INJECTION INTRAMUSCULAR; INTRAVENOUS; SUBCUTANEOUS at 09:29

## 2018-10-18 RX ADMIN — HYDROMORPHONE HYDROCHLORIDE 1 MILLIGRAM(S): 2 INJECTION INTRAMUSCULAR; INTRAVENOUS; SUBCUTANEOUS at 20:58

## 2018-10-18 NOTE — PROGRESS NOTE ADULT - PROBLEM SELECTOR PLAN 1
10/5 Peripheral flow confirms relapse with BCR/ABL rearrangement in 79.5% of cells  Continue Inotuzamab (Day 1, 8 and 15)  Monitor CBC/Lytes and transfuse/replete PRN  Thrombocytopenia with SDH: Patient platelet refractory, per blood bank patient to receive 1/2 bags over 3 hours q 12 hours. HLA is not available today   Strict Is and Os/Daily weights/Mouth Care  Completed Allopurinol 300mg PO daily x 10 days   Antiemetics  IVF  Bone marrow bx after second cycle

## 2018-10-18 NOTE — PROGRESS NOTE ADULT - ASSESSMENT
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 Now being treated with Inatuzumab day 1, 8, 15. The patients hospital course has been complicated by SDH, neutropenic fevers, transaminitis and upper GIB.

## 2018-10-18 NOTE — PROGRESS NOTE ADULT - PROBLEM SELECTOR PLAN 3
10/4 CT Head revealed small bilateral acute on chronic convexity subdural hematomas. Repeat CT Head on 10/5, 10/6 and 10/11 is stable with 10/16 decreasing in size  10/4 Given DDAVP   Continue Keppra 500mg BID for seizure ppx   Keep PLT >80K  Neuro checks q4 hrs  Pain management   Neurosurgery following, no intervention at this time. Continue to keep platelet goal above 80K now per neuro surgery and do not tap Omaya until platelets are at a minimum of 50K as SDH may increase

## 2018-10-18 NOTE — PROGRESS NOTE ADULT - ATTENDING COMMENTS
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 FISH and PCR negative. s/p Masha-Auto on 12/16/16. Pt was on sprycel maintenance. 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  Now getting Inotuzumab C1 day 10.    -Febrile. BCx/UCx negative 10/13, 10/16.  CT c/a/p 10/16- Scattered and patchy groundglass opacities in the right upper and lower lobes, possibly infection.   Heterogeneous thickening of the endometrial cavity.  Change to meropenem today, continue vancomycin/mycamine.    - cont to keep plt ct >80K, keep hb >7.    - cont keppra px.  Repeat head CT stable 10/11.  No ommaya tap until her plt >50.   - GI bleed has resolved, continue po protonix.  - OOB, ambulation. 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 FISH and PCR negative. s/p Masha-Auto on 12/16/16. Pt was on sprycel maintenance. 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  Now getting Inotuzumab C1 day 11.    -Fevers are better today. BCx/UCx negative 10/13, 10/16.  CT c/a/p 10/16- Scattered and patchy groundglass opacities in the right upper and lower lobes, possibly infection.  Heterogeneous thickening of the endometrial cavity.  Continue vanco, meropenem, mycamine.    - cont to keep plt ct >80K, keep hb >7.  Plt are refractory- transfuse 1/2 units over 3 hours twice a day.  - cont keppra px.  Repeat head CT stable 10/11.  No ommaya tap until her plt >50.   - GI bleed has resolved, continue po protonix.  - OOB, ambulation.

## 2018-10-18 NOTE — PROGRESS NOTE ADULT - PROBLEM SELECTOR PLAN 2
Pt is neutropenic, febrile   If febrile Pan Cx and CXR  Continue Vancomycin and Posaconazole for ppx  10/16 CT C/A/P with Scattered and patchy groundglass opacities in the right upper and lower lobes, possibly infection.   Broadened Cefepime to Meropenem on 10/17

## 2018-10-18 NOTE — PROGRESS NOTE ADULT - SUBJECTIVE AND OBJECTIVE BOX
Diagnosis: Relapsed ALL Ph (+)     Protocol/Chemo Regimen: Inotuzamab Day 1,8 and 15    Day: 11    Pt endorsed: Headache stable with pain medications    Review of Systems: Patient denies dizziness, visual changes, chest pain, palpitations, SOB, abdominal pain, vomiting, diarrhea or dysuria.     Pain scale: denies now     Diet: Regular    Allergies    No Known Drug Allergies  shellfish (Nausea; Vomiting)    Intolerances        ANTIMICROBIALS  meropenem  IVPB      meropenem  IVPB 2000 milliGRAM(s) IV Intermittent every 8 hours  micafungin IVPB 100 milliGRAM(s) IV Intermittent daily  vancomycin  IVPB      vancomycin  IVPB 1000 milliGRAM(s) IV Intermittent every 8 hours      HEME/ONC MEDICATIONS      STANDING MEDICATIONS  influenza   Vaccine 0.5 milliLiter(s) IntraMuscular once  levETIRAcetam 500 milliGRAM(s) Oral two times a day  multivitamin 1 Tablet(s) Oral daily  pantoprazole    Tablet 40 milliGRAM(s) Oral before breakfast  sodium chloride 0.9% lock flush 20 milliLiter(s) IV Push once  sodium chloride 0.9%. 1000 milliLiter(s) IV Continuous <Continuous>      PRN MEDICATIONS  acetaminophen   Tablet .. 650 milliGRAM(s) Oral every 6 hours PRN  acetaminophen   Tablet .. 650 milliGRAM(s) Oral every 6 hours PRN  HYDROmorphone  Injectable 0.5 milliGRAM(s) IV Push every 2 hours PRN  HYDROmorphone  Injectable 1 milliGRAM(s) IV Push every 4 hours PRN  metoclopramide Injectable 10 milliGRAM(s) IV Push every 6 hours PRN  sodium chloride 0.9% lock flush 10 milliLiter(s) IV Push every 1 hour PRN  sodium chloride 0.9% lock flush 10 milliLiter(s) IV Push every 12 hours PRN        Vital Signs Last 24 Hrs  T(C): 36.3 (18 Oct 2018 05:22), Max: 38.6 (17 Oct 2018 21:42)  T(F): 97.3 (18 Oct 2018 05:22), Max: 101.5 (17 Oct 2018 21:42)  HR: 82 (18 Oct 2018 05:22) (79 - 114)  BP: 150/86 (18 Oct 2018 05:22) (124/75 - 154/107)  BP(mean): --  RR: 18 (18 Oct 2018 05:22) (17 - 20)  SpO2: 98% (18 Oct 2018 05:22) (97% - 100%)    PHYSICAL EXAM  General: NAD  HEENT:  clear oropharynx, right subconjunctival hemorrhage, right side of head Ommaya in place   Neck: supple  CV: (+) S1/S2 RRR  Lungs: clear to auscultation, no wheezes or rales  Abdomen: soft, non-tender, non-distended (+) BS x 4Q  Ext: no edema  Skin: no rashes  Neuro: alert and oriented X 3  Central Line: PICC CDI           LABS:                        7.6    0.4   )-----------( <2       ( 18 Oct 2018 06:46 )             23.2         Mean Cell Volume : 92.6 fl  Mean Cell Hemoglobin : 30.4 pg  Mean Cell Hemoglobin Concentration : 32.8 gm/dL  Auto Neutrophil # : x  Auto Lymphocyte # : x  Auto Monocyte # : x  Auto Eosinophil # : x  Auto Basophil # : x  Auto Neutrophil % : x  Auto Lymphocyte % : x  Auto Monocyte % : x  Auto Eosinophil % : x  Auto Basophil % : x      10-18    142  |  105  |  9   ----------------------------<  97  3.9   |  24  |  0.70    Ca    9.0      18 Oct 2018 06:46  Phos  3.2     10-18  Mg     2.1     10-18    TPro  6.4  /  Alb  3.3  /  TBili  0.7  /  DBili  x   /  AST  16  /  ALT  47<H>  /  AlkPhos  190<H>  10-18      Mg 2.1  Phos 3.2  Mg --  Phos 3.8      PT/INR - ( 18 Oct 2018 06:46 )   PT: 10.8 sec;   INR: 1.00 ratio         PTT - ( 18 Oct 2018 06:46 )  PTT:28.4 sec      Uric Acid 1.3        RECENT CULTURES:  10-16 @ 13:21  .Blood Blood-Peripheral      No growth to date.  --  10-16 @ 13:20  .Blood PICC/PERC Double Lumen BLUE      No growth to date.  --  10-16 @ 13:01  .Urine Clean Catch (Midstream)      No growth  --  10-13 @ 03:01  .Urine Clean Catch (Midstream)      No growth  --  10-13 @ 02:31  .Blood Blood-Peripheral      No growth at 5 days.  --      RADIOLOGY & ADDITIONAL STUDIES:    EXAM:  CT ABDOMEN AND PELVIS OC IC/ CT CHEST IC                        PROCEDURE DATE:  10/16/2018    IMPRESSION: Scattered and patchy groundglass opacities in the right upper and lower   lobes, possibly infection.   Heterogeneous thickening of the endometrial cavity, measuring 1.3 cm, for   which further evaluation with pelvic ultrasound is recommended.      EXAM:  CT BRAIN                        PROCEDURE DATE:  10/16/2018    IMPRESSION:  Decreased conspicuity to posterior interhemispheric and left   tentorial subdural hematoma compared with the prior 10/11/2018. Right   frontal ventricular catheter with its tip in the region of the right   foramen of Stephan.      EXAM:  CT BRAIN                        PROCEDURE DATE:  10/11/2018    IMPRESSION: Small residual hypodense left frontoparietal subdural collection, without   significant change in size.   Unchanged posterior left parafalcine and left tentorial subdural hematoma.  No new areas of acute intracranial hemorrhage.      EXAM:  XR CHEST PORTABLE URGENT 1V                        PROCEDURE DATE:  10/09/2018    Impression: Low lung volumes without consolidations or edema. No pneumothorax  Right PICC line in expected location      EXAM:  CT BRAIN                        PROCEDURE DATE:  10/06/2018    Impression: No significant change when allowing for differences in   technique.      EXAM:  CT ABDOMEN AND PELVIS                        PROCEDURE DATE:  10/05/2018    IMPRESSION: No evidence of retroperitoneal bleed.  Cholelithiasis. Diagnosis: Relapsed ALL Ph (+)     Protocol/Chemo Regimen: Inotuzamab Day 1,8 and 15    Day: 11    Pt endorsed: Headache stable with pain medications    Review of Systems: Patient denies dizziness, visual changes, chest pain, palpitations, SOB, abdominal pain, vomiting, diarrhea or dysuria.     Pain scale: denies now     Diet: Regular    Allergies    No Known Drug Allergies  shellfish (Nausea; Vomiting)    Intolerances        ANTIMICROBIALS  meropenem  IVPB      meropenem  IVPB 2000 milliGRAM(s) IV Intermittent every 8 hours  micafungin IVPB 100 milliGRAM(s) IV Intermittent daily  vancomycin  IVPB      vancomycin  IVPB 1000 milliGRAM(s) IV Intermittent every 8 hours      HEME/ONC MEDICATIONS      STANDING MEDICATIONS  influenza   Vaccine 0.5 milliLiter(s) IntraMuscular once  levETIRAcetam 500 milliGRAM(s) Oral two times a day  multivitamin 1 Tablet(s) Oral daily  pantoprazole    Tablet 40 milliGRAM(s) Oral before breakfast  sodium chloride 0.9% lock flush 20 milliLiter(s) IV Push once  sodium chloride 0.9%. 1000 milliLiter(s) IV Continuous <Continuous>      PRN MEDICATIONS  acetaminophen   Tablet .. 650 milliGRAM(s) Oral every 6 hours PRN  acetaminophen   Tablet .. 650 milliGRAM(s) Oral every 6 hours PRN  HYDROmorphone  Injectable 0.5 milliGRAM(s) IV Push every 2 hours PRN  HYDROmorphone  Injectable 1 milliGRAM(s) IV Push every 4 hours PRN  metoclopramide Injectable 10 milliGRAM(s) IV Push every 6 hours PRN  sodium chloride 0.9% lock flush 10 milliLiter(s) IV Push every 1 hour PRN  sodium chloride 0.9% lock flush 10 milliLiter(s) IV Push every 12 hours PRN        Vital Signs Last 24 Hrs  T(C): 36.3 (18 Oct 2018 05:22), Max: 38.6 (17 Oct 2018 21:42)  T(F): 97.3 (18 Oct 2018 05:22), Max: 101.5 (17 Oct 2018 21:42)  HR: 82 (18 Oct 2018 05:22) (79 - 114)  BP: 150/86 (18 Oct 2018 05:22) (124/75 - 154/107)  BP(mean): --  RR: 18 (18 Oct 2018 05:22) (17 - 20)  SpO2: 98% (18 Oct 2018 05:22) (97% - 100%)    PHYSICAL EXAM  General: NAD  HEENT:  clear oropharynx, right subconjunctival hemorrhage, right side of head Ommaya in place, blood blister on right side of tongue, ecchymotic spots on lower lip   Neck: supple  CV: (+) S1/S2 RRR  Lungs: clear to auscultation, no wheezes or rales  Abdomen: soft, non-tender, non-distended (+) BS x 4Q  Ext: no edema  Skin: petechial   rashes on left upper chest   Neuro: alert and oriented X 3  Central Line: PICC CDI           LABS:                        7.6    0.4   )-----------( <2       ( 18 Oct 2018 06:46 )             23.2         Mean Cell Volume : 92.6 fl  Mean Cell Hemoglobin : 30.4 pg  Mean Cell Hemoglobin Concentration : 32.8 gm/dL  Auto Neutrophil # : x  Auto Lymphocyte # : x  Auto Monocyte # : x  Auto Eosinophil # : x  Auto Basophil # : x  Auto Neutrophil % : x  Auto Lymphocyte % : x  Auto Monocyte % : x  Auto Eosinophil % : x  Auto Basophil % : x      10-18    142  |  105  |  9   ----------------------------<  97  3.9   |  24  |  0.70    Ca    9.0      18 Oct 2018 06:46  Phos  3.2     10-18  Mg     2.1     10-18    TPro  6.4  /  Alb  3.3  /  TBili  0.7  /  DBili  x   /  AST  16  /  ALT  47<H>  /  AlkPhos  190<H>  10-18      Mg 2.1  Phos 3.2  Mg --  Phos 3.8      PT/INR - ( 18 Oct 2018 06:46 )   PT: 10.8 sec;   INR: 1.00 ratio         PTT - ( 18 Oct 2018 06:46 )  PTT:28.4 sec      Uric Acid 1.3        RECENT CULTURES:  10-16 @ 13:21  .Blood Blood-Peripheral      No growth to date.  --  10-16 @ 13:20  .Blood PICC/PERC Double Lumen BLUE      No growth to date.  --  10-16 @ 13:01  .Urine Clean Catch (Midstream)      No growth  --  10-13 @ 03:01  .Urine Clean Catch (Midstream)      No growth  --  10-13 @ 02:31  .Blood Blood-Peripheral      No growth at 5 days.  --      RADIOLOGY & ADDITIONAL STUDIES:    EXAM:  CT ABDOMEN AND PELVIS OC IC/ CT CHEST IC                        PROCEDURE DATE:  10/16/2018    IMPRESSION: Scattered and patchy groundglass opacities in the right upper and lower   lobes, possibly infection.   Heterogeneous thickening of the endometrial cavity, measuring 1.3 cm, for   which further evaluation with pelvic ultrasound is recommended.      EXAM:  CT BRAIN                        PROCEDURE DATE:  10/16/2018    IMPRESSION:  Decreased conspicuity to posterior interhemispheric and left   tentorial subdural hematoma compared with the prior 10/11/2018. Right   frontal ventricular catheter with its tip in the region of the right   foramen of Stephan.      EXAM:  CT BRAIN                        PROCEDURE DATE:  10/11/2018    IMPRESSION: Small residual hypodense left frontoparietal subdural collection, without   significant change in size.   Unchanged posterior left parafalcine and left tentorial subdural hematoma.  No new areas of acute intracranial hemorrhage.      EXAM:  XR CHEST PORTABLE URGENT 1V                        PROCEDURE DATE:  10/09/2018    Impression: Low lung volumes without consolidations or edema. No pneumothorax  Right PICC line in expected location      EXAM:  CT BRAIN                        PROCEDURE DATE:  10/06/2018    Impression: No significant change when allowing for differences in   technique.      EXAM:  CT ABDOMEN AND PELVIS                        PROCEDURE DATE:  10/05/2018    IMPRESSION: No evidence of retroperitoneal bleed.  Cholelithiasis.

## 2018-10-19 LAB
ALBUMIN SERPL ELPH-MCNC: 3 G/DL — LOW (ref 3.3–5)
ALP SERPL-CCNC: 158 U/L — HIGH (ref 40–120)
ALT FLD-CCNC: 30 U/L — SIGNIFICANT CHANGE UP (ref 10–45)
ANION GAP SERPL CALC-SCNC: 7 MMOL/L — SIGNIFICANT CHANGE UP (ref 5–17)
ANTIBODY ID 1_1: SIGNIFICANT CHANGE UP
AST SERPL-CCNC: 11 U/L — SIGNIFICANT CHANGE UP (ref 10–40)
BILIRUB SERPL-MCNC: 0.5 MG/DL — SIGNIFICANT CHANGE UP (ref 0.2–1.2)
BLD GP AB SCN SERPL QL: POSITIVE — SIGNIFICANT CHANGE UP
BUN SERPL-MCNC: 7 MG/DL — SIGNIFICANT CHANGE UP (ref 7–23)
CALCIUM SERPL-MCNC: 9.1 MG/DL — SIGNIFICANT CHANGE UP (ref 8.4–10.5)
CHLORIDE SERPL-SCNC: 105 MMOL/L — SIGNIFICANT CHANGE UP (ref 96–108)
CO2 SERPL-SCNC: 26 MMOL/L — SIGNIFICANT CHANGE UP (ref 22–31)
CREAT SERPL-MCNC: 0.62 MG/DL — SIGNIFICANT CHANGE UP (ref 0.5–1.3)
DAT C3-SP REAG RBC QL: POSITIVE — SIGNIFICANT CHANGE UP
DAT POLY-SP REAG RBC QL: POSITIVE — SIGNIFICANT CHANGE UP
DIRECT COOMBS IGG: NEGATIVE — SIGNIFICANT CHANGE UP
ELUATE ANTIBODY 1: SIGNIFICANT CHANGE UP
GLUCOSE SERPL-MCNC: 92 MG/DL — SIGNIFICANT CHANGE UP (ref 70–99)
HCT VFR BLD CALC: 19.1 % — CRITICAL LOW (ref 34.5–45)
HGB BLD-MCNC: 6.6 G/DL — CRITICAL LOW (ref 11.5–15.5)
LDH SERPL L TO P-CCNC: 247 U/L — HIGH (ref 50–242)
MAGNESIUM SERPL-MCNC: 2.2 MG/DL — SIGNIFICANT CHANGE UP (ref 1.6–2.6)
MCHC RBC-ENTMCNC: 32.2 PG — SIGNIFICANT CHANGE UP (ref 27–34)
MCHC RBC-ENTMCNC: 34.9 GM/DL — SIGNIFICANT CHANGE UP (ref 32–36)
MCV RBC AUTO: 92.1 FL — SIGNIFICANT CHANGE UP (ref 80–100)
PHOSPHATE SERPL-MCNC: 2.2 MG/DL — LOW (ref 2.5–4.5)
PLATELET # BLD AUTO: 4 K/UL — CRITICAL LOW (ref 150–400)
POTASSIUM SERPL-MCNC: 3.9 MMOL/L — SIGNIFICANT CHANGE UP (ref 3.5–5.3)
POTASSIUM SERPL-SCNC: 3.9 MMOL/L — SIGNIFICANT CHANGE UP (ref 3.5–5.3)
PROT SERPL-MCNC: 5.4 G/DL — LOW (ref 6–8.3)
RBC # BLD: 2.07 M/UL — LOW (ref 3.8–5.2)
RBC # FLD: 15 % — HIGH (ref 10.3–14.5)
RH IG SCN BLD-IMP: POSITIVE — SIGNIFICANT CHANGE UP
SODIUM SERPL-SCNC: 138 MMOL/L — SIGNIFICANT CHANGE UP (ref 135–145)
URATE SERPL-MCNC: 1.5 MG/DL — LOW (ref 2.5–7)
VANCOMYCIN TROUGH SERPL-MCNC: 16.1 UG/ML — SIGNIFICANT CHANGE UP (ref 10–20)
WBC # BLD: 0.5 K/UL — CRITICAL LOW (ref 3.8–10.5)
WBC # FLD AUTO: 0.5 K/UL — CRITICAL LOW (ref 3.8–10.5)

## 2018-10-19 PROCEDURE — 86077 PHYS BLOOD BANK SERV XMATCH: CPT

## 2018-10-19 PROCEDURE — 99232 SBSQ HOSP IP/OBS MODERATE 35: CPT

## 2018-10-19 RX ORDER — SODIUM,POTASSIUM PHOSPHATES 278-250MG
1 POWDER IN PACKET (EA) ORAL
Qty: 0 | Refills: 0 | Status: COMPLETED | OUTPATIENT
Start: 2018-10-19 | End: 2018-10-19

## 2018-10-19 RX ORDER — HYDROMORPHONE HYDROCHLORIDE 2 MG/ML
1 INJECTION INTRAMUSCULAR; INTRAVENOUS; SUBCUTANEOUS EVERY 4 HOURS
Qty: 0 | Refills: 0 | Status: DISCONTINUED | OUTPATIENT
Start: 2018-10-19 | End: 2018-10-26

## 2018-10-19 RX ORDER — HYDROMORPHONE HYDROCHLORIDE 2 MG/ML
0.5 INJECTION INTRAMUSCULAR; INTRAVENOUS; SUBCUTANEOUS
Qty: 0 | Refills: 0 | Status: DISCONTINUED | OUTPATIENT
Start: 2018-10-19 | End: 2018-10-23

## 2018-10-19 RX ADMIN — HYDROMORPHONE HYDROCHLORIDE 1 MILLIGRAM(S): 2 INJECTION INTRAMUSCULAR; INTRAVENOUS; SUBCUTANEOUS at 09:41

## 2018-10-19 RX ADMIN — Medication 250 MILLIGRAM(S): at 21:41

## 2018-10-19 RX ADMIN — LEVETIRACETAM 500 MILLIGRAM(S): 250 TABLET, FILM COATED ORAL at 05:30

## 2018-10-19 RX ADMIN — LEVETIRACETAM 500 MILLIGRAM(S): 250 TABLET, FILM COATED ORAL at 17:16

## 2018-10-19 RX ADMIN — HYDROMORPHONE HYDROCHLORIDE 1 MILLIGRAM(S): 2 INJECTION INTRAMUSCULAR; INTRAVENOUS; SUBCUTANEOUS at 18:10

## 2018-10-19 RX ADMIN — Medication 250 MILLIGRAM(S): at 07:09

## 2018-10-19 RX ADMIN — Medication 250 MILLIGRAM(S): at 13:34

## 2018-10-19 RX ADMIN — HYDROMORPHONE HYDROCHLORIDE 1 MILLIGRAM(S): 2 INJECTION INTRAMUSCULAR; INTRAVENOUS; SUBCUTANEOUS at 10:00

## 2018-10-19 RX ADMIN — MICAFUNGIN SODIUM 105 MILLIGRAM(S): 100 INJECTION, POWDER, LYOPHILIZED, FOR SOLUTION INTRAVENOUS at 11:20

## 2018-10-19 RX ADMIN — HYDROMORPHONE HYDROCHLORIDE 1 MILLIGRAM(S): 2 INJECTION INTRAMUSCULAR; INTRAVENOUS; SUBCUTANEOUS at 05:38

## 2018-10-19 RX ADMIN — HYDROMORPHONE HYDROCHLORIDE 1 MILLIGRAM(S): 2 INJECTION INTRAMUSCULAR; INTRAVENOUS; SUBCUTANEOUS at 22:05

## 2018-10-19 RX ADMIN — HYDROMORPHONE HYDROCHLORIDE 1 MILLIGRAM(S): 2 INJECTION INTRAMUSCULAR; INTRAVENOUS; SUBCUTANEOUS at 13:34

## 2018-10-19 RX ADMIN — HYDROMORPHONE HYDROCHLORIDE 1 MILLIGRAM(S): 2 INJECTION INTRAMUSCULAR; INTRAVENOUS; SUBCUTANEOUS at 01:40

## 2018-10-19 RX ADMIN — HYDROMORPHONE HYDROCHLORIDE 1 MILLIGRAM(S): 2 INJECTION INTRAMUSCULAR; INTRAVENOUS; SUBCUTANEOUS at 01:25

## 2018-10-19 RX ADMIN — MEROPENEM 200 MILLIGRAM(S): 1 INJECTION INTRAVENOUS at 06:30

## 2018-10-19 RX ADMIN — HYDROMORPHONE HYDROCHLORIDE 1 MILLIGRAM(S): 2 INJECTION INTRAMUSCULAR; INTRAVENOUS; SUBCUTANEOUS at 13:50

## 2018-10-19 RX ADMIN — MEROPENEM 200 MILLIGRAM(S): 1 INJECTION INTRAVENOUS at 14:21

## 2018-10-19 RX ADMIN — MEROPENEM 200 MILLIGRAM(S): 1 INJECTION INTRAVENOUS at 21:41

## 2018-10-19 RX ADMIN — SODIUM CHLORIDE 50 MILLILITER(S): 9 INJECTION INTRAMUSCULAR; INTRAVENOUS; SUBCUTANEOUS at 05:29

## 2018-10-19 RX ADMIN — HYDROMORPHONE HYDROCHLORIDE 1 MILLIGRAM(S): 2 INJECTION INTRAMUSCULAR; INTRAVENOUS; SUBCUTANEOUS at 17:42

## 2018-10-19 RX ADMIN — PANTOPRAZOLE SODIUM 40 MILLIGRAM(S): 20 TABLET, DELAYED RELEASE ORAL at 05:30

## 2018-10-19 RX ADMIN — HYDROMORPHONE HYDROCHLORIDE 1 MILLIGRAM(S): 2 INJECTION INTRAMUSCULAR; INTRAVENOUS; SUBCUTANEOUS at 21:48

## 2018-10-19 RX ADMIN — HYDROMORPHONE HYDROCHLORIDE 1 MILLIGRAM(S): 2 INJECTION INTRAMUSCULAR; INTRAVENOUS; SUBCUTANEOUS at 05:53

## 2018-10-19 RX ADMIN — Medication 1 TABLET(S): at 08:20

## 2018-10-19 RX ADMIN — Medication 1 TABLET(S): at 12:10

## 2018-10-19 RX ADMIN — Medication 1 TABLET(S): at 11:20

## 2018-10-19 RX ADMIN — SODIUM CHLORIDE 50 MILLILITER(S): 9 INJECTION INTRAMUSCULAR; INTRAVENOUS; SUBCUTANEOUS at 17:16

## 2018-10-19 NOTE — PROGRESS NOTE ADULT - SUBJECTIVE AND OBJECTIVE BOX
Diagnosis: Relapsed ALL Ph (+)     Protocol/Chemo Regimen: Inotuzamab Day 1,8 and 15    Day: 12    Pt endorsed: Headache stable with pain medications    Review of Systems: Patient denies dizziness, visual changes, chest pain, palpitations, SOB, abdominal pain, vomiting, diarrhea or dysuria.     Pain scale: denies now     Diet: Regular    Allergies    No Known Drug Allergies  shellfish (Nausea; Vomiting)    Intolerances        ANTIMICROBIALS  meropenem  IVPB      meropenem  IVPB 2000 milliGRAM(s) IV Intermittent every 8 hours  micafungin IVPB 100 milliGRAM(s) IV Intermittent daily  vancomycin  IVPB      vancomycin  IVPB 1000 milliGRAM(s) IV Intermittent every 8 hours      HEME/ONC MEDICATIONS      STANDING MEDICATIONS  influenza   Vaccine 0.5 milliLiter(s) IntraMuscular once  levETIRAcetam 500 milliGRAM(s) Oral two times a day  multivitamin 1 Tablet(s) Oral daily  pantoprazole    Tablet 40 milliGRAM(s) Oral before breakfast  sodium chloride 0.9% lock flush 20 milliLiter(s) IV Push once  sodium chloride 0.9%. 1000 milliLiter(s) IV Continuous <Continuous>      PRN MEDICATIONS  acetaminophen   Tablet .. 650 milliGRAM(s) Oral every 6 hours PRN  acetaminophen   Tablet .. 650 milliGRAM(s) Oral every 6 hours PRN  HYDROmorphone  Injectable 0.5 milliGRAM(s) IV Push every 2 hours PRN  HYDROmorphone  Injectable 1 milliGRAM(s) IV Push every 4 hours PRN  metoclopramide Injectable 10 milliGRAM(s) IV Push every 6 hours PRN  sodium chloride 0.9% lock flush 10 milliLiter(s) IV Push every 1 hour PRN  sodium chloride 0.9% lock flush 10 milliLiter(s) IV Push every 12 hours PRN        Vital Signs Last 24 Hrs  T(C): 37 (19 Oct 2018 05:42), Max: 37.6 (18 Oct 2018 20:35)  T(F): 98.6 (19 Oct 2018 05:42), Max: 99.7 (18 Oct 2018 20:35)  HR: 74 (19 Oct 2018 05:42) (74 - 89)  BP: 147/85 (19 Oct 2018 05:42) (127/85 - 155/82)  BP(mean): --  RR: 18 (19 Oct 2018 05:42) (18 - 20)  SpO2: 99% (19 Oct 2018 05:42) (97% - 100%)    PHYSICAL EXAM  General: NAD  HEENT:  clear oropharynx, right subconjunctival hemorrhage, right side of head Ommaya in place, blood blister on right side of tongue, ecchymotic spots on lower lip -improved   Neck: supple  CV: (+) S1/S2 RRR  Lungs: clear to auscultation, no wheezes or rales  Abdomen: soft, non-tender, non-distended (+) BS x 4Q  Ext: no edema  Skin: petechial   rashes on left upper chest   Neuro: alert and oriented X 3  Central Line: PICC CDI     LABS:                            6.6    0.5   )-----------( 4        ( 19 Oct 2018 06:52 )             19.1         Mean Cell Volume : 92.1 fl  Mean Cell Hemoglobin : 32.2 pg  Mean Cell Hemoglobin Concentration : 34.9 gm/dL  Auto Neutrophil # : x  Auto Lymphocyte # : x  Auto Monocyte # : x  Auto Eosinophil # : x  Auto Basophil # : x  Auto Neutrophil % : x  Auto Lymphocyte % : x  Auto Monocyte % : x  Auto Eosinophil % : x  Auto Basophil % : x      10-19    138  |  105  |  7   ----------------------------<  92  3.9   |  26  |  0.62    Ca    9.1      19 Oct 2018 06:52  Phos  2.2     10-19  Mg     2.2     10-19    TPro  5.4<L>  /  Alb  3.0<L>  /  TBili  0.5  /  DBili  x   /  AST  11  /  ALT  30  /  AlkPhos  158<H>  10-19      Mg 2.2  Phos 2.2      PT/INR - ( 18 Oct 2018 06:46 )   PT: 10.8 sec;   INR: 1.00 ratio         PTT - ( 18 Oct 2018 06:46 )  PTT:28.4 sec      Uric Acid 1.5          RECENT CULTURES:    10-16 @ 13:21  .Blood Blood-Peripheral      No growth to date.  --  10-16 @ 13:20  .Blood PICC/PERC Double Lumen BLUE      No growth to date.  --  10-16 @ 13:01  .Urine Clean Catch (Midstream)      No growth  --  10-13 @ 03:01  .Urine Clean Catch (Midstream)      No growth  --  10-13 @ 02:31  .Blood Blood-Peripheral      No growth at 5 days.  --      RADIOLOGY & ADDITIONAL STUDIES:    EXAM:  CT ABDOMEN AND PELVIS OC IC/ CT CHEST IC                        PROCEDURE DATE:  10/16/2018    IMPRESSION: Scattered and patchy groundglass opacities in the right upper and lower   lobes, possibly infection.   Heterogeneous thickening of the endometrial cavity, measuring 1.3 cm, for   which further evaluation with pelvic ultrasound is recommended.      EXAM:  CT BRAIN                        PROCEDURE DATE:  10/16/2018    IMPRESSION:  Decreased conspicuity to posterior interhemispheric and left   tentorial subdural hematoma compared with the prior 10/11/2018. Right   frontal ventricular catheter with its tip in the region of the right   foramen of Stephan.      EXAM:  CT BRAIN                        PROCEDURE DATE:  10/11/2018    IMPRESSION: Small residual hypodense left frontoparietal subdural collection, without   significant change in size.   Unchanged posterior left parafalcine and left tentorial subdural hematoma.  No new areas of acute intracranial hemorrhage.      EXAM:  XR CHEST PORTABLE URGENT 1V                        PROCEDURE DATE:  10/09/2018    Impression: Low lung volumes without consolidations or edema. No pneumothorax  Right PICC line in expected location      EXAM:  CT BRAIN                        PROCEDURE DATE:  10/06/2018    Impression: No significant change when allowing for differences in   technique.      EXAM:  CT ABDOMEN AND PELVIS                        PROCEDURE DATE:  10/05/2018    IMPRESSION: No evidence of retroperitoneal bleed.  Cholelithiasis. Diagnosis: Relapsed ALL Ph (+)     Protocol/Chemo Regimen: Inotuzamab Day 1,8 and 15    Day: 12    Pt endorsed: Headache stable with pain medications    Review of Systems: Patient denies dizziness, visual changes, chest pain, palpitations, SOB, abdominal pain, vomiting, diarrhea or dysuria.     Pain scale: denies now     Diet: Regular    Allergies    No Known Drug Allergies  shellfish (Nausea; Vomiting)    Intolerances        ANTIMICROBIALS  meropenem  IVPB      meropenem  IVPB 2000 milliGRAM(s) IV Intermittent every 8 hours  micafungin IVPB 100 milliGRAM(s) IV Intermittent daily  vancomycin  IVPB      vancomycin  IVPB 1000 milliGRAM(s) IV Intermittent every 8 hours      HEME/ONC MEDICATIONS      STANDING MEDICATIONS  influenza   Vaccine 0.5 milliLiter(s) IntraMuscular once  levETIRAcetam 500 milliGRAM(s) Oral two times a day  multivitamin 1 Tablet(s) Oral daily  pantoprazole    Tablet 40 milliGRAM(s) Oral before breakfast  sodium chloride 0.9% lock flush 20 milliLiter(s) IV Push once  sodium chloride 0.9%. 1000 milliLiter(s) IV Continuous <Continuous>      PRN MEDICATIONS  acetaminophen   Tablet .. 650 milliGRAM(s) Oral every 6 hours PRN  acetaminophen   Tablet .. 650 milliGRAM(s) Oral every 6 hours PRN  HYDROmorphone  Injectable 0.5 milliGRAM(s) IV Push every 2 hours PRN  HYDROmorphone  Injectable 1 milliGRAM(s) IV Push every 4 hours PRN  metoclopramide Injectable 10 milliGRAM(s) IV Push every 6 hours PRN  sodium chloride 0.9% lock flush 10 milliLiter(s) IV Push every 1 hour PRN  sodium chloride 0.9% lock flush 10 milliLiter(s) IV Push every 12 hours PRN        Vital Signs Last 24 Hrs  T(C): 37 (19 Oct 2018 05:42), Max: 37.6 (18 Oct 2018 20:35)  T(F): 98.6 (19 Oct 2018 05:42), Max: 99.7 (18 Oct 2018 20:35)  HR: 74 (19 Oct 2018 05:42) (74 - 89)  BP: 147/85 (19 Oct 2018 05:42) (127/85 - 155/82)  BP(mean): --  RR: 18 (19 Oct 2018 05:42) (18 - 20)  SpO2: 99% (19 Oct 2018 05:42) (97% - 100%)    PHYSICAL EXAM  General: NAD  HEENT:  clear oropharynx, right subconjunctival hemorrhage, right side of head Ommaya in place, blood blister on right side of tongue, ecchymotic spots on lower lip -improved   Neck: supple  CV: (+) S1/S2 RRR  Lungs: clear to auscultation, no wheezes or rales  Abdomen: soft, non-tender, non-distended (+) BS x 4Q  Ext: no edema  Skin: petechial   rashes on left upper chest and b/l LE   Neuro: alert and oriented X 3  Central Line: PICC CDI     LABS:                            6.6    0.5   )-----------( 4        ( 19 Oct 2018 06:52 )             19.1         Mean Cell Volume : 92.1 fl  Mean Cell Hemoglobin : 32.2 pg  Mean Cell Hemoglobin Concentration : 34.9 gm/dL  Auto Neutrophil # : x  Auto Lymphocyte # : x  Auto Monocyte # : x  Auto Eosinophil # : x  Auto Basophil # : x  Auto Neutrophil % : x  Auto Lymphocyte % : x  Auto Monocyte % : x  Auto Eosinophil % : x  Auto Basophil % : x      10-19    138  |  105  |  7   ----------------------------<  92  3.9   |  26  |  0.62    Ca    9.1      19 Oct 2018 06:52  Phos  2.2     10-19  Mg     2.2     10-19    TPro  5.4<L>  /  Alb  3.0<L>  /  TBili  0.5  /  DBili  x   /  AST  11  /  ALT  30  /  AlkPhos  158<H>  10-19      Mg 2.2  Phos 2.2      PT/INR - ( 18 Oct 2018 06:46 )   PT: 10.8 sec;   INR: 1.00 ratio         PTT - ( 18 Oct 2018 06:46 )  PTT:28.4 sec      Uric Acid 1.5          RECENT CULTURES:    10-16 @ 13:21  .Blood Blood-Peripheral      No growth to date.  --  10-16 @ 13:20  .Blood PICC/PERC Double Lumen BLUE      No growth to date.  --  10-16 @ 13:01  .Urine Clean Catch (Midstream)      No growth  --  10-13 @ 03:01  .Urine Clean Catch (Midstream)      No growth  --  10-13 @ 02:31  .Blood Blood-Peripheral      No growth at 5 days.  --      RADIOLOGY & ADDITIONAL STUDIES:    EXAM:  CT ABDOMEN AND PELVIS OC IC/ CT CHEST IC                        PROCEDURE DATE:  10/16/2018    IMPRESSION: Scattered and patchy groundglass opacities in the right upper and lower   lobes, possibly infection.   Heterogeneous thickening of the endometrial cavity, measuring 1.3 cm, for   which further evaluation with pelvic ultrasound is recommended.      EXAM:  CT BRAIN                        PROCEDURE DATE:  10/16/2018    IMPRESSION:  Decreased conspicuity to posterior interhemispheric and left   tentorial subdural hematoma compared with the prior 10/11/2018. Right   frontal ventricular catheter with its tip in the region of the right   foramen of Stephan.      EXAM:  CT BRAIN                        PROCEDURE DATE:  10/11/2018    IMPRESSION: Small residual hypodense left frontoparietal subdural collection, without   significant change in size.   Unchanged posterior left parafalcine and left tentorial subdural hematoma.  No new areas of acute intracranial hemorrhage.      EXAM:  XR CHEST PORTABLE URGENT 1V                        PROCEDURE DATE:  10/09/2018    Impression: Low lung volumes without consolidations or edema. No pneumothorax  Right PICC line in expected location      EXAM:  CT BRAIN                        PROCEDURE DATE:  10/06/2018    Impression: No significant change when allowing for differences in   technique.      EXAM:  CT ABDOMEN AND PELVIS                        PROCEDURE DATE:  10/05/2018    IMPRESSION: No evidence of retroperitoneal bleed.  Cholelithiasis.

## 2018-10-19 NOTE — PROGRESS NOTE ADULT - ATTENDING COMMENTS
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 FISH and PCR negative. s/p Masha-Auto on 12/16/16. Pt was on sprycel maintenance. 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  Now getting Inotuzumab C1 day 11.    -Fevers are better today. BCx/UCx negative 10/13, 10/16.  CT c/a/p 10/16- Scattered and patchy groundglass opacities in the right upper and lower lobes, possibly infection.  Heterogeneous thickening of the endometrial cavity.  Continue vanco, meropenem, mycamine.    - cont to keep plt ct >80K, keep hb >7.  Plt are refractory- transfuse 1/2 units over 3 hours twice a day.  - cont keppra px.  Repeat head CT stable 10/11.  No ommaya tap until her plt >50.   - GI bleed has resolved, continue po protonix.  - OOB, ambulation. 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 FISH and PCR negative. s/p Masha-Auto on 12/16/16. Pt was on sprycel maintenance. 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  Now getting Inotuzumab C1 day 12.    -Afebrile today. BCx/UCx negative 10/13, 10/16.  CT c/a/p 10/16- Scattered and patchy groundglass opacities in the right upper and lower lobes, possibly infection.  Heterogeneous thickening of the endometrial cavity.  Continue vanco, meropenem, mycamine.    - cont to keep plt ct >80K, keep hb >7.  Plt are refractory- transfuse 1/2 units over 3 hours twice a day.  - cont keppra px.  Repeat head CT stable 10/11.  No ommaya tap until her plt >50.   - GI bleed has resolved, continue po protonix.  - OOB, ambulation.

## 2018-10-19 NOTE — PROGRESS NOTE ADULT - PROBLEM SELECTOR PLAN 5
mild, improved today  likely 2/2 to Inotuzamab  Monitor daily CMP  Avoid hepatotoxic medications No pharmacologic ppx 2/2 thrombocytopenia and SDH

## 2018-10-19 NOTE — PROGRESS NOTE ADULT - PROBLEM SELECTOR PLAN 1
10/5 Peripheral flow confirms relapse with BCR/ABL rearrangement in 79.5% of cells  Continue Inotuzamab (Day 1, 8 and 15)  Monitor CBC/Lytes and transfuse/replete PRN  Anemia PRBC x 1 unit   Thrombocytopenia with SDH: Patient platelet refractory, per blood bank patient to receive 1/2 bags over 3 hours q 12 hours. HLA is not available today   Strict Is and Os/Daily weights/Mouth Care  Completed Allopurinol 300mg PO daily x 10 days   Antiemetics  IVF  Bone marrow bx after second cycle 10/5 Peripheral flow confirms relapse with BCR/ABL rearrangement in 79.5% of cells  Continue Inotuzamab (Day 1, 8 and 15)  Monitor CBC/Lytes and transfuse/replete PRN  Anemia PRBC x 1 unit   Hypophosphatemia: Potassium   Thrombocytopenia with SDH: Patient platelet refractory, per blood bank patient to receive 1/2 bags over 3 hours q 12 hours. HLA is not available today   Strict Is and Os/Daily weights/Mouth Care  Completed Allopurinol 300mg PO daily x 10 days   Antiemetics  IVF  Bone marrow bx after second cycle 10/5 Peripheral flow confirms relapse with BCR/ABL rearrangement in 79.5% of cells  Continue Inotuzamab (Day 1, 8 and 15)  Monitor CBC/Lytes and transfuse/replete PRN  Anemia PRBC x 1 unit   Hypophosphatemia: Potassium phosphate PO x 2 doses   Thrombocytopenia with SDH: Patient platelet refractory, per blood bank patient to receive 1/2 bags over 3 hours q 12 hours. HLA is not available today   Strict Is and Os/Daily weights/Mouth Care  Completed Allopurinol 300mg PO daily x 10 days   Antiemetics  IVF  Bone marrow bx after second cycle

## 2018-10-19 NOTE — PROGRESS NOTE ADULT - PROBLEM SELECTOR PLAN 4
With no vision changes  Monitor closely   Keep PLT >20K if possible With no vision changes, improved   Monitor closely   Keep PLT >20K if possible

## 2018-10-19 NOTE — PROGRESS NOTE ADULT - PROBLEM SELECTOR PLAN 2
Pt is neutropenic, febrile   If febrile Pan Cx and CXR  Continue Vancomycin and Posaconazole for ppx  10/16 CT C/A/P with Scattered and patchy groundglass opacities in the right upper and lower lobes, possibly infection.   Broadened Cefepime to Meropenem on 10/17 Pt is neutropenic, afebrile   If febrile Pan Cx and CXR  Continue Vancomycin and Mycamine for ppx  10/16 CT C/A/P with Scattered and patchy groundglass opacities in the right upper and lower lobes, possibly infection.   Broadened Cefepime to Meropenem on 10/17

## 2018-10-19 NOTE — PROGRESS NOTE ADULT - PROBLEM SELECTOR PLAN 6
IMPROVE score: 1  DVT ppx: contraindicated in setting of severe thrombocytopenia   DIET: Regular low sodium  Dr.Benziger Rosario   Internal Medicine   PGY-2   [phone] (long range pager)  [phone] (short range)
No pharmacologic ppx 2/2 thrombocytopenia and SDH

## 2018-10-20 LAB
ALBUMIN SERPL ELPH-MCNC: 3.2 G/DL — LOW (ref 3.3–5)
ALP SERPL-CCNC: 163 U/L — HIGH (ref 40–120)
ALT FLD-CCNC: 28 U/L — SIGNIFICANT CHANGE UP (ref 10–45)
ANION GAP SERPL CALC-SCNC: 11 MMOL/L — SIGNIFICANT CHANGE UP (ref 5–17)
AST SERPL-CCNC: 12 U/L — SIGNIFICANT CHANGE UP (ref 10–40)
BASOPHILS # BLD AUTO: 0 K/UL — SIGNIFICANT CHANGE UP (ref 0–0.2)
BASOPHILS NFR BLD AUTO: 0 % — SIGNIFICANT CHANGE UP (ref 0–2)
BILIRUB SERPL-MCNC: 0.6 MG/DL — SIGNIFICANT CHANGE UP (ref 0.2–1.2)
BUN SERPL-MCNC: 7 MG/DL — SIGNIFICANT CHANGE UP (ref 7–23)
CALCIUM SERPL-MCNC: 9.1 MG/DL — SIGNIFICANT CHANGE UP (ref 8.4–10.5)
CHLORIDE SERPL-SCNC: 104 MMOL/L — SIGNIFICANT CHANGE UP (ref 96–108)
CO2 SERPL-SCNC: 24 MMOL/L — SIGNIFICANT CHANGE UP (ref 22–31)
CREAT SERPL-MCNC: 0.62 MG/DL — SIGNIFICANT CHANGE UP (ref 0.5–1.3)
EOSINOPHIL # BLD AUTO: 0 K/UL — SIGNIFICANT CHANGE UP (ref 0–0.5)
EOSINOPHIL NFR BLD AUTO: 1.2 % — SIGNIFICANT CHANGE UP (ref 0–6)
GLUCOSE SERPL-MCNC: 102 MG/DL — HIGH (ref 70–99)
HCT VFR BLD CALC: 22.3 % — LOW (ref 34.5–45)
HGB BLD-MCNC: 8.2 G/DL — LOW (ref 11.5–15.5)
LDH SERPL L TO P-CCNC: 262 U/L — HIGH (ref 50–242)
LYMPHOCYTES # BLD AUTO: 0.6 K/UL — LOW (ref 1–3.3)
LYMPHOCYTES # BLD AUTO: 97.2 % — HIGH (ref 13–44)
MAGNESIUM SERPL-MCNC: 2.2 MG/DL — SIGNIFICANT CHANGE UP (ref 1.6–2.6)
MCHC RBC-ENTMCNC: 32.9 PG — SIGNIFICANT CHANGE UP (ref 27–34)
MCHC RBC-ENTMCNC: 36.9 GM/DL — HIGH (ref 32–36)
MCV RBC AUTO: 89.2 FL — SIGNIFICANT CHANGE UP (ref 80–100)
MONOCYTES # BLD AUTO: 0 K/UL — SIGNIFICANT CHANGE UP (ref 0–0.9)
MONOCYTES NFR BLD AUTO: 0 % — LOW (ref 2–14)
NEUTROPHILS # BLD AUTO: 0 K/UL — LOW (ref 1.8–7.4)
NEUTROPHILS NFR BLD AUTO: 1.5 % — LOW (ref 43–77)
PHOSPHATE SERPL-MCNC: 2.3 MG/DL — LOW (ref 2.5–4.5)
PLATELET # BLD AUTO: 5 K/UL — CRITICAL LOW (ref 150–400)
POTASSIUM SERPL-MCNC: 3.8 MMOL/L — SIGNIFICANT CHANGE UP (ref 3.5–5.3)
POTASSIUM SERPL-SCNC: 3.8 MMOL/L — SIGNIFICANT CHANGE UP (ref 3.5–5.3)
PROT SERPL-MCNC: 5.8 G/DL — LOW (ref 6–8.3)
RBC # BLD: 2.5 M/UL — LOW (ref 3.8–5.2)
RBC # FLD: 15.7 % — HIGH (ref 10.3–14.5)
SODIUM SERPL-SCNC: 139 MMOL/L — SIGNIFICANT CHANGE UP (ref 135–145)
URATE SERPL-MCNC: 1.4 MG/DL — LOW (ref 2.5–7)
WBC # BLD: 0.7 K/UL — CRITICAL LOW (ref 3.8–10.5)
WBC # FLD AUTO: 0.7 K/UL — CRITICAL LOW (ref 3.8–10.5)

## 2018-10-20 PROCEDURE — 99232 SBSQ HOSP IP/OBS MODERATE 35: CPT

## 2018-10-20 RX ORDER — POTASSIUM PHOSPHATE, MONOBASIC POTASSIUM PHOSPHATE, DIBASIC 236; 224 MG/ML; MG/ML
15 INJECTION, SOLUTION INTRAVENOUS ONCE
Qty: 0 | Refills: 0 | Status: COMPLETED | OUTPATIENT
Start: 2018-10-20 | End: 2018-10-20

## 2018-10-20 RX ADMIN — HYDROMORPHONE HYDROCHLORIDE 0.5 MILLIGRAM(S): 2 INJECTION INTRAMUSCULAR; INTRAVENOUS; SUBCUTANEOUS at 00:36

## 2018-10-20 RX ADMIN — MEROPENEM 200 MILLIGRAM(S): 1 INJECTION INTRAVENOUS at 20:04

## 2018-10-20 RX ADMIN — LEVETIRACETAM 500 MILLIGRAM(S): 250 TABLET, FILM COATED ORAL at 06:31

## 2018-10-20 RX ADMIN — MEROPENEM 200 MILLIGRAM(S): 1 INJECTION INTRAVENOUS at 06:44

## 2018-10-20 RX ADMIN — SODIUM CHLORIDE 50 MILLILITER(S): 9 INJECTION INTRAMUSCULAR; INTRAVENOUS; SUBCUTANEOUS at 17:09

## 2018-10-20 RX ADMIN — Medication 250 MILLIGRAM(S): at 06:32

## 2018-10-20 RX ADMIN — HYDROMORPHONE HYDROCHLORIDE 0.5 MILLIGRAM(S): 2 INJECTION INTRAMUSCULAR; INTRAVENOUS; SUBCUTANEOUS at 18:14

## 2018-10-20 RX ADMIN — HYDROMORPHONE HYDROCHLORIDE 1 MILLIGRAM(S): 2 INJECTION INTRAMUSCULAR; INTRAVENOUS; SUBCUTANEOUS at 14:28

## 2018-10-20 RX ADMIN — HYDROMORPHONE HYDROCHLORIDE 1 MILLIGRAM(S): 2 INJECTION INTRAMUSCULAR; INTRAVENOUS; SUBCUTANEOUS at 06:14

## 2018-10-20 RX ADMIN — Medication 250 MILLIGRAM(S): at 20:03

## 2018-10-20 RX ADMIN — HYDROMORPHONE HYDROCHLORIDE 0.5 MILLIGRAM(S): 2 INJECTION INTRAMUSCULAR; INTRAVENOUS; SUBCUTANEOUS at 10:10

## 2018-10-20 RX ADMIN — SODIUM CHLORIDE 50 MILLILITER(S): 9 INJECTION INTRAMUSCULAR; INTRAVENOUS; SUBCUTANEOUS at 06:33

## 2018-10-20 RX ADMIN — HYDROMORPHONE HYDROCHLORIDE 1 MILLIGRAM(S): 2 INJECTION INTRAMUSCULAR; INTRAVENOUS; SUBCUTANEOUS at 01:46

## 2018-10-20 RX ADMIN — HYDROMORPHONE HYDROCHLORIDE 1 MILLIGRAM(S): 2 INJECTION INTRAMUSCULAR; INTRAVENOUS; SUBCUTANEOUS at 14:13

## 2018-10-20 RX ADMIN — PANTOPRAZOLE SODIUM 40 MILLIGRAM(S): 20 TABLET, DELAYED RELEASE ORAL at 06:31

## 2018-10-20 RX ADMIN — Medication 1 TABLET(S): at 11:30

## 2018-10-20 RX ADMIN — Medication 250 MILLIGRAM(S): at 14:25

## 2018-10-20 RX ADMIN — HYDROMORPHONE HYDROCHLORIDE 0.5 MILLIGRAM(S): 2 INJECTION INTRAMUSCULAR; INTRAVENOUS; SUBCUTANEOUS at 10:25

## 2018-10-20 RX ADMIN — LEVETIRACETAM 500 MILLIGRAM(S): 250 TABLET, FILM COATED ORAL at 17:09

## 2018-10-20 RX ADMIN — HYDROMORPHONE HYDROCHLORIDE 1 MILLIGRAM(S): 2 INJECTION INTRAMUSCULAR; INTRAVENOUS; SUBCUTANEOUS at 22:09

## 2018-10-20 RX ADMIN — HYDROMORPHONE HYDROCHLORIDE 0.5 MILLIGRAM(S): 2 INJECTION INTRAMUSCULAR; INTRAVENOUS; SUBCUTANEOUS at 00:51

## 2018-10-20 RX ADMIN — POTASSIUM PHOSPHATE, MONOBASIC POTASSIUM PHOSPHATE, DIBASIC 62.5 MILLIMOLE(S): 236; 224 INJECTION, SOLUTION INTRAVENOUS at 10:15

## 2018-10-20 RX ADMIN — MICAFUNGIN SODIUM 105 MILLIGRAM(S): 100 INJECTION, POWDER, LYOPHILIZED, FOR SOLUTION INTRAVENOUS at 11:28

## 2018-10-20 RX ADMIN — MEROPENEM 200 MILLIGRAM(S): 1 INJECTION INTRAVENOUS at 14:14

## 2018-10-20 RX ADMIN — HYDROMORPHONE HYDROCHLORIDE 1 MILLIGRAM(S): 2 INJECTION INTRAMUSCULAR; INTRAVENOUS; SUBCUTANEOUS at 02:05

## 2018-10-20 RX ADMIN — HYDROMORPHONE HYDROCHLORIDE 1 MILLIGRAM(S): 2 INJECTION INTRAMUSCULAR; INTRAVENOUS; SUBCUTANEOUS at 06:30

## 2018-10-20 NOTE — PROGRESS NOTE ADULT - PROBLEM SELECTOR PLAN 1
10/5 Peripheral flow confirms relapse with BCR/ABL rearrangement in 79.5% of cells  Continue Inotuzamab (Day 1, 8 and 15)  Monitor CBC/Lytes and transfuse/replete PRN  Anemia PRBC x 1 unit   Hypophosphatemia: Potassium phosphate PO x 2 doses   Thrombocytopenia with SDH: Patient platelet refractory, per blood bank patient to receive 1/2 bags over 3 hours q 12 hours. HLA is not available today   Strict Is and Os/Daily weights/Mouth Care  Completed Allopurinol 300mg PO daily x 10 days   Antiemetics  IVF  Bone marrow bx after second cycle 10/5 Peripheral flow confirms relapse with BCR/ABL rearrangement in 79.5% of cells  Continue Inotuzamab (Day 1, 8 and 15)  For BM bx after second cycle  Monitor CBC/Lytes and transfuse/replete PRN  Thrombocytopenia with SDH: Patient platelet refractory, per blood bank patient to receive 1/2 bags over 3 hours q 12 hours. HLA is not available today   Hypophosphatemia: K phos 15 mmol IV x 1    Strict Is and Os/Daily weights/Mouth Care  Antiemetics  IVF

## 2018-10-20 NOTE — PROGRESS NOTE ADULT - PROBLEM SELECTOR PLAN 4
With no vision changes, improved   Monitor closely   Keep PLT >20K if possible No pharmacologic ppx 2/2 thrombocytopenia and SDH          Contact Information [phone]

## 2018-10-20 NOTE — PROGRESS NOTE ADULT - PROBLEM SELECTOR PLAN 2
Pt is neutropenic, afebrile   If febrile Pan Cx and CXR  Continue Vancomycin and Mycamine for ppx  10/16 CT C/A/P with Scattered and patchy groundglass opacities in the right upper and lower lobes, possibly infection.   Broadened Cefepime to Meropenem on 10/17 Pt is neutropenic, afebrile   If febrile Pan Cx and CXR  Continue Vancomycin and Mycamine for ppx  10/16 CT C/A/P with Scattered and patchy ground-glass opacities in the right upper and lower lobes, possibly infection. Broadened Cefepime to Meropenem on 10/17

## 2018-10-20 NOTE — PROGRESS NOTE ADULT - PROBLEM SELECTOR PLAN 3
10/4 CT Head revealed small bilateral acute on chronic convexity subdural hematomas. Repeat CT Head on 10/5, 10/6 and 10/11 is stable with 10/16 decreasing in size  10/4 Given DDAVP   Continue Keppra 500mg BID for seizure ppx   Keep PLT >80K  Neuro checks q4 hrs  Pain management   Neurosurgery following, no intervention at this time. Continue to keep platelet goal above 80K now per neuro surgery and do not tap Omaya until platelets are at a minimum of 50K as SDH may increase 10/4 CT Head revealed small bilateral acute on chronic convexity subdural hematomas. Repeat CT Head on 10/5, 10/6 and 10/11 is stable with 10/16 results showing SDH decreasing in size  Continue Keppra 500mg BID for seizure ppx   Keep PLT >80K  Neuro checks q4 hrs  Pain management   Neurosurgery following, no intervention at this time. Continue to keep platelet goal above 80K and do not tap Omaya until platelets are at a minimum of 50K as SDH may increase

## 2018-10-20 NOTE — PROGRESS NOTE ADULT - SUBJECTIVE AND OBJECTIVE BOX
Diagnosis:    Protocol/Chemo Regimen:    Day:     Pt endorsed:    Review of Systems:     Pain scale:     Diet:     Allergies    No Known Drug Allergies  shellfish (Nausea; Vomiting)    Intolerances        ANTIMICROBIALS  meropenem  IVPB      meropenem  IVPB 2000 milliGRAM(s) IV Intermittent every 8 hours  micafungin IVPB 100 milliGRAM(s) IV Intermittent daily  vancomycin  IVPB      vancomycin  IVPB 1000 milliGRAM(s) IV Intermittent every 8 hours      HEME/ONC MEDICATIONS      STANDING MEDICATIONS  influenza   Vaccine 0.5 milliLiter(s) IntraMuscular once  levETIRAcetam 500 milliGRAM(s) Oral two times a day  multivitamin 1 Tablet(s) Oral daily  pantoprazole    Tablet 40 milliGRAM(s) Oral before breakfast  sodium chloride 0.9% lock flush 20 milliLiter(s) IV Push once  sodium chloride 0.9%. 1000 milliLiter(s) IV Continuous <Continuous>      PRN MEDICATIONS  acetaminophen   Tablet .. 650 milliGRAM(s) Oral every 6 hours PRN  acetaminophen   Tablet .. 650 milliGRAM(s) Oral every 6 hours PRN  HYDROmorphone  Injectable 0.5 milliGRAM(s) IV Push every 2 hours PRN  HYDROmorphone  Injectable 1 milliGRAM(s) IV Push every 4 hours PRN  metoclopramide Injectable 10 milliGRAM(s) IV Push every 6 hours PRN  sodium chloride 0.9% lock flush 10 milliLiter(s) IV Push every 1 hour PRN  sodium chloride 0.9% lock flush 10 milliLiter(s) IV Push every 12 hours PRN        Vital Signs Last 24 Hrs  T(C): 36.8 (20 Oct 2018 05:45), Max: 37.6 (19 Oct 2018 18:40)  T(F): 98.3 (20 Oct 2018 05:45), Max: 99.7 (19 Oct 2018 18:40)  HR: 84 (20 Oct 2018 05:45) (69 - 92)  BP: 131/86 (20 Oct 2018 05:45) (115/83 - 150/87)  BP(mean): --  RR: 18 (20 Oct 2018 05:45) (18 - 20)  SpO2: 98% (20 Oct 2018 05:45) (97% - 99%)    PHYSICAL EXAM  General: NAD  HEENT: PERRLA, EOMOI, clear oropharynx, anicteric sclera, pink conjunctiva  Neck: supple  CV: (+) S1/S2 RRR  Lungs: clear to auscultation, no wheezes or rales  Abdomen: soft, non-tender, non-distended (+) BS  Ext: no clubbing, cyanosis or edema  Skin: no rashes and no petechiae  Neuro: alert and oriented X 3, no focal deficits  Central Line:     RECENT CULTURES:  10-16 @ 13:21  .Blood Blood-Peripheral  --  --  --    No growth to date.  --  10-16 @ 13:20  .Blood PICC/PERC Double Lumen BLUE  --  --  --    No growth to date.  --  10-16 @ 13:01  .Urine Clean Catch (Midstream)  --  --  --    No growth  --        LABS:                        8.2    0.7   )-----------( 5        ( 20 Oct 2018 06:57 )             22.3         Mean Cell Volume : 89.2 fl  Mean Cell Hemoglobin : 32.9 pg  Mean Cell Hemoglobin Concentration : 36.9 gm/dL  Auto Neutrophil # : x  Auto Lymphocyte # : x  Auto Monocyte # : x  Auto Eosinophil # : x  Auto Basophil # : x  Auto Neutrophil % : x  Auto Lymphocyte % : x  Auto Monocyte % : x  Auto Eosinophil % : x  Auto Basophil % : x      10-20    139  |  104  |  7   ----------------------------<  102<H>  3.8   |  24  |  0.62    Ca    9.1      20 Oct 2018 06:57  Phos  2.3     10-20  Mg     2.2     10-20    TPro  5.8<L>  /  Alb  3.2<L>  /  TBili  0.6  /  DBili  x   /  AST  12  /  ALT  28  /  AlkPhos  163<H>  10-20      Mg 2.2  Phos 2.3            Uric Acid 1.4        RADIOLOGY & ADDITIONAL STUDIES: Diagnosis: Relapsed ALL Ph (+)     Protocol/Chemo Regimen: Inotuzamab Day 1,8 and 15    Day: 13    Pt endorsed: Headache stable with pain medications and appetite improved today    Review of Systems: Patient denies dizziness, visual changes, chest pain, palpitations, SOB, abdominal pain, vomiting, diarrhea or dysuria.     Pain scale: denies now     Diet: Regular    Allergies: No Known Drug Allergies  shellfish (Nausea; Vomiting)      ANTIMICROBIALS  meropenem  IVPB 2000 milliGRAM(s) IV Intermittent every 8 hours  micafungin IVPB 100 milliGRAM(s) IV Intermittent daily  vancomycin  IVPB 1000 milliGRAM(s) IV Intermittent every 8 hours      STANDING MEDICATIONS  influenza   Vaccine 0.5 milliLiter(s) IntraMuscular once  levETIRAcetam 500 milliGRAM(s) Oral two times a day  multivitamin 1 Tablet(s) Oral daily  pantoprazole    Tablet 40 milliGRAM(s) Oral before breakfast  sodium chloride 0.9% lock flush 20 milliLiter(s) IV Push once  sodium chloride 0.9%. 1000 milliLiter(s) IV Continuous <Continuous>      PRN MEDICATIONS  acetaminophen   Tablet .. 650 milliGRAM(s) Oral every 6 hours PRN  acetaminophen   Tablet .. 650 milliGRAM(s) Oral every 6 hours PRN  HYDROmorphone  Injectable 0.5 milliGRAM(s) IV Push every 2 hours PRN  HYDROmorphone  Injectable 1 milliGRAM(s) IV Push every 4 hours PRN  metoclopramide Injectable 10 milliGRAM(s) IV Push every 6 hours PRN  sodium chloride 0.9% lock flush 10 milliLiter(s) IV Push every 1 hour PRN  sodium chloride 0.9% lock flush 10 milliLiter(s) IV Push every 12 hours PRN      Vital Signs Last 24 Hrs  T(C): 36.8 (20 Oct 2018 05:45), Max: 37.6 (19 Oct 2018 18:40)  T(F): 98.3 (20 Oct 2018 05:45), Max: 99.7 (19 Oct 2018 18:40)  HR: 84 (20 Oct 2018 05:45) (69 - 92)  BP: 131/86 (20 Oct 2018 05:45) (115/83 - 150/87)  BP(mean): --  RR: 18 (20 Oct 2018 05:45) (18 - 20)  SpO2: 98% (20 Oct 2018 05:45) (97% - 99%)      PHYSICAL EXAM  General: NAD  HEENT: PERRLA, EOMI, clear oropharynx, anicteric sclera, pink conjunctiva  Neck: supple  CV: (+) S1/S2 RRR  Lungs: clear to auscultation, no wheezes or rales  Abdomen: soft, non-tender, non-distended (+) BS  Ext: no clubbing, cyanosis or edema  Skin: no rashes. Generalized ecchymosis  Neuro: alert and oriented X 3, no focal deficits  Central Line: C/D/I       LABS:                        8.2    0.7   )-----------( 5        ( 20 Oct 2018 06:57 )             22.3         Mean Cell Volume : 89.2 fl  Mean Cell Hemoglobin : 32.9 pg  Mean Cell Hemoglobin Concentration : 36.9 gm/dL  Auto Neutrophil # : x  Auto Lymphocyte # : x  Auto Monocyte # : x  Auto Eosinophil # : x  Auto Basophil # : x  Auto Neutrophil % : x  Auto Lymphocyte % : x  Auto Monocyte % : x  Auto Eosinophil % : x  Auto Basophil % : x      10-20    139  |  104  |  7   ----------------------------<  102<H>  3.8   |  24  |  0.62    Ca    9.1      20 Oct 2018 06:57  Phos  2.3     10-20  Mg     2.2     10-20    TPro  5.8<L>  /  Alb  3.2<L>  /  TBili  0.6  /  DBili  x   /  AST  12  /  ALT  28  /  AlkPhos  163<H>  10-20      Mg 2.2  Phos 2.3        Uric Acid 1.4        RECENT CULTURES:    Culture - Blood (10.16.18 @ 13:21)    Specimen Source: .Blood Blood-Peripheral    Culture Results:   No growth to date.    Culture - Blood (10.16.18 @ 13:20)    Specimen Source: .Blood PICC/PERC Double Lumen BLUE    Culture Results:   No growth to date.    Culture - Urine (10.13.18 @ 03:01)    Specimen Source: .Urine Clean Catch (Midstream)    Culture Results:   No growth      RADIOLOGY & ADDITIONAL STUDIES:    EXAM:  CT ABDOMEN AND PELVIS OC IC/ CT CHEST IC                        PROCEDURE DATE:  10/16/2018    IMPRESSION: Scattered and patchy groundglass opacities in the right upper and lower   lobes, possibly infection.   Heterogeneous thickening of the endometrial cavity, measuring 1.3 cm, for   which further evaluation with pelvic ultrasound is recommended.      EXAM:  CT BRAIN                        PROCEDURE DATE:  10/16/2018    IMPRESSION:  Decreased conspicuity to posterior interhemispheric and left   tentorial subdural hematoma compared with the prior 10/11/2018. Right   frontal ventricular catheter with its tip in the region of the right   foramen of Stephan.

## 2018-10-20 NOTE — PROGRESS NOTE ADULT - ATTENDING COMMENTS
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 FISH and PCR negative. s/p Masha-Auto on 12/16/16. Pt was on sprycel maintenance. 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  Now getting Inotuzumab C1 day 12.    -Afebrile today. BCx/UCx negative 10/13, 10/16.  CT c/a/p 10/16- Scattered and patchy groundglass opacities in the right upper and lower lobes, possibly infection.  Heterogeneous thickening of the endometrial cavity.  Continue vanco, meropenem, mycamine.    - cont to keep plt ct >80K, keep hb >7.  Plt are refractory- transfuse 1/2 units over 3 hours twice a day.  - cont keppra px.  Repeat head CT stable 10/11.  No ommaya tap until her plt >50.   - GI bleed has resolved, continue po protonix.  - OOB, ambulation. 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 FISH and PCR negative. s/p Masha-Auto on 12/16/16. Pt was on sprycel maintenance. 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  Now getting Inotuzumab C1 day 13.    -Afebrile today. BCx/UCx negative 10/13, 10/16.  CT c/a/p 10/16- Scattered and patchy groundglass opacities in the right upper and lower lobes, possibly infection.  Heterogeneous thickening of the endometrial cavity.  Continue vanco, meropenem, mycamine.    - goal plt ct >80K, but plt are refractory- transfuse 1/2 units over 3 hours twice a day. Keep hb >7.   - cont keppra px.  Repeat head CT stable 10/11.  No ommaya tap until her plt >50.   - GI bleed has resolved, continue po protonix.  - OOB, ambulation.

## 2018-10-20 NOTE — PROGRESS NOTE ADULT - ASSESSMENT
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 Now being treated with Inatuzumab day 1, 8, 15. The patients hospital course has been complicated by SDH, neutropenic fevers, transaminitis and upper GIB. 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 Now being treated with Inatuzumab day 1, 8, 15. The patients hospital course has been complicated by SDH, subconjunctival hemorrhage, neutropenic fevers, transaminitis and upper GIB. The patient has pancytopenia 2/2 disease and/or chemotherapy

## 2018-10-20 NOTE — PROGRESS NOTE ADULT - PROBLEM/PLAN-5
Ice    Elevate    Lidocaine cream for topical pain relief.     Ibuprofen (advil, Motrin) 600-800mg every 8 hours as needed for pain/inflammation/fever and/or tylenol (acetaminophen) 1000mg every 8 hours for pain/fever. You may take these medications together.      Ibuprofen/motrin/advil:   You may take this medicine with food or milk to reduce stomach upset.       Possible side effects include: Nausea, upper abdominal pain, dizziness,  Bleeding from the stomach, which may appear as blood in vomit or stool (red or  black color); rapid weight gain, leg swelling or easy bruising, change in the  amount of urine, confusion/mood changes, very stiff neck, yellowing of the  eyes/skin- if these occur, you should go to the ER immediately. This medication  goes through  (metabolized by) your kidneys. If you have a history of stomach  ulcers or bleeding you should not take this medicine.        Acetaminophen/tylenol:   Before taking other medicines, read their labels to be sure they do not also  contain acetaminophen. An overdose can be harmful or fatal.    Acetaminophen is considered safe for short-term use during pregnancy and  breastfeeding. Excessive use of alcoholic beverages may increase the risk of  liver damage when taking this medicine. This medication goes through  (metabolized by) your liver.  Maximum of 4000mg per day from all sources.             
DISPLAY PLAN FREE TEXT

## 2018-10-21 LAB
ALBUMIN SERPL ELPH-MCNC: 3.4 G/DL — SIGNIFICANT CHANGE UP (ref 3.3–5)
ALP SERPL-CCNC: 159 U/L — HIGH (ref 40–120)
ALT FLD-CCNC: 24 U/L — SIGNIFICANT CHANGE UP (ref 10–45)
ANION GAP SERPL CALC-SCNC: 10 MMOL/L — SIGNIFICANT CHANGE UP (ref 5–17)
AST SERPL-CCNC: 14 U/L — SIGNIFICANT CHANGE UP (ref 10–40)
BASOPHILS # BLD AUTO: 0 K/UL — SIGNIFICANT CHANGE UP (ref 0–0.2)
BILIRUB SERPL-MCNC: 0.6 MG/DL — SIGNIFICANT CHANGE UP (ref 0.2–1.2)
BUN SERPL-MCNC: 10 MG/DL — SIGNIFICANT CHANGE UP (ref 7–23)
CALCIUM SERPL-MCNC: 9.6 MG/DL — SIGNIFICANT CHANGE UP (ref 8.4–10.5)
CHLORIDE SERPL-SCNC: 103 MMOL/L — SIGNIFICANT CHANGE UP (ref 96–108)
CO2 SERPL-SCNC: 25 MMOL/L — SIGNIFICANT CHANGE UP (ref 22–31)
CREAT SERPL-MCNC: 0.57 MG/DL — SIGNIFICANT CHANGE UP (ref 0.5–1.3)
CULTURE RESULTS: SIGNIFICANT CHANGE UP
CULTURE RESULTS: SIGNIFICANT CHANGE UP
EOSINOPHIL # BLD AUTO: 0 K/UL — SIGNIFICANT CHANGE UP (ref 0–0.5)
EOSINOPHIL NFR BLD AUTO: 2 % — SIGNIFICANT CHANGE UP (ref 0–6)
GLUCOSE SERPL-MCNC: 99 MG/DL — SIGNIFICANT CHANGE UP (ref 70–99)
HCT VFR BLD CALC: 23.2 % — LOW (ref 34.5–45)
HGB BLD-MCNC: 8.1 G/DL — LOW (ref 11.5–15.5)
LDH SERPL L TO P-CCNC: 258 U/L — HIGH (ref 50–242)
LYMPHOCYTES # BLD AUTO: 0.6 K/UL — LOW (ref 1–3.3)
LYMPHOCYTES # BLD AUTO: 86 % — HIGH (ref 13–44)
MAGNESIUM SERPL-MCNC: 2.4 MG/DL — SIGNIFICANT CHANGE UP (ref 1.6–2.6)
MCHC RBC-ENTMCNC: 30.7 PG — SIGNIFICANT CHANGE UP (ref 27–34)
MCHC RBC-ENTMCNC: 34.9 GM/DL — SIGNIFICANT CHANGE UP (ref 32–36)
MCV RBC AUTO: 88.1 FL — SIGNIFICANT CHANGE UP (ref 80–100)
MONOCYTES # BLD AUTO: 0 K/UL — SIGNIFICANT CHANGE UP (ref 0–0.9)
MONOCYTES NFR BLD AUTO: 2 % — SIGNIFICANT CHANGE UP (ref 2–14)
NEUTROPHILS # BLD AUTO: 0 K/UL — LOW (ref 1.8–7.4)
NEUTROPHILS NFR BLD AUTO: 2 % — LOW (ref 43–77)
PHOSPHATE SERPL-MCNC: 2.2 MG/DL — LOW (ref 2.5–4.5)
PLATELET # BLD AUTO: 4 K/UL — CRITICAL LOW (ref 150–400)
POTASSIUM SERPL-MCNC: 4.1 MMOL/L — SIGNIFICANT CHANGE UP (ref 3.5–5.3)
POTASSIUM SERPL-SCNC: 4.1 MMOL/L — SIGNIFICANT CHANGE UP (ref 3.5–5.3)
PROT SERPL-MCNC: 6.1 G/DL — SIGNIFICANT CHANGE UP (ref 6–8.3)
RBC # BLD: 2.64 M/UL — LOW (ref 3.8–5.2)
RBC # FLD: 15.6 % — HIGH (ref 10.3–14.5)
SODIUM SERPL-SCNC: 138 MMOL/L — SIGNIFICANT CHANGE UP (ref 135–145)
SPECIMEN SOURCE: SIGNIFICANT CHANGE UP
SPECIMEN SOURCE: SIGNIFICANT CHANGE UP
URATE SERPL-MCNC: 1.3 MG/DL — LOW (ref 2.5–7)
WBC # BLD: 0.7 K/UL — CRITICAL LOW (ref 3.8–10.5)
WBC # FLD AUTO: 0.7 K/UL — CRITICAL LOW (ref 3.8–10.5)

## 2018-10-21 PROCEDURE — 99232 SBSQ HOSP IP/OBS MODERATE 35: CPT

## 2018-10-21 RX ORDER — POTASSIUM PHOSPHATE, MONOBASIC POTASSIUM PHOSPHATE, DIBASIC 236; 224 MG/ML; MG/ML
15 INJECTION, SOLUTION INTRAVENOUS ONCE
Qty: 0 | Refills: 0 | Status: COMPLETED | OUTPATIENT
Start: 2018-10-21 | End: 2018-10-21

## 2018-10-21 RX ADMIN — Medication 250 MILLIGRAM(S): at 13:46

## 2018-10-21 RX ADMIN — Medication 250 MILLIGRAM(S): at 06:35

## 2018-10-21 RX ADMIN — HYDROMORPHONE HYDROCHLORIDE 1 MILLIGRAM(S): 2 INJECTION INTRAMUSCULAR; INTRAVENOUS; SUBCUTANEOUS at 23:50

## 2018-10-21 RX ADMIN — HYDROMORPHONE HYDROCHLORIDE 1 MILLIGRAM(S): 2 INJECTION INTRAMUSCULAR; INTRAVENOUS; SUBCUTANEOUS at 19:43

## 2018-10-21 RX ADMIN — POTASSIUM PHOSPHATE, MONOBASIC POTASSIUM PHOSPHATE, DIBASIC 62.5 MILLIMOLE(S): 236; 224 INJECTION, SOLUTION INTRAVENOUS at 09:01

## 2018-10-21 RX ADMIN — Medication 1 TABLET(S): at 12:32

## 2018-10-21 RX ADMIN — MEROPENEM 200 MILLIGRAM(S): 1 INJECTION INTRAVENOUS at 21:29

## 2018-10-21 RX ADMIN — MICAFUNGIN SODIUM 105 MILLIGRAM(S): 100 INJECTION, POWDER, LYOPHILIZED, FOR SOLUTION INTRAVENOUS at 12:32

## 2018-10-21 RX ADMIN — HYDROMORPHONE HYDROCHLORIDE 1 MILLIGRAM(S): 2 INJECTION INTRAMUSCULAR; INTRAVENOUS; SUBCUTANEOUS at 19:28

## 2018-10-21 RX ADMIN — HYDROMORPHONE HYDROCHLORIDE 1 MILLIGRAM(S): 2 INJECTION INTRAMUSCULAR; INTRAVENOUS; SUBCUTANEOUS at 10:52

## 2018-10-21 RX ADMIN — HYDROMORPHONE HYDROCHLORIDE 1 MILLIGRAM(S): 2 INJECTION INTRAMUSCULAR; INTRAVENOUS; SUBCUTANEOUS at 15:29

## 2018-10-21 RX ADMIN — MEROPENEM 200 MILLIGRAM(S): 1 INJECTION INTRAVENOUS at 17:10

## 2018-10-21 RX ADMIN — HYDROMORPHONE HYDROCHLORIDE 1 MILLIGRAM(S): 2 INJECTION INTRAMUSCULAR; INTRAVENOUS; SUBCUTANEOUS at 11:07

## 2018-10-21 RX ADMIN — LEVETIRACETAM 500 MILLIGRAM(S): 250 TABLET, FILM COATED ORAL at 17:10

## 2018-10-21 RX ADMIN — HYDROMORPHONE HYDROCHLORIDE 1 MILLIGRAM(S): 2 INJECTION INTRAMUSCULAR; INTRAVENOUS; SUBCUTANEOUS at 15:14

## 2018-10-21 RX ADMIN — Medication 250 MILLIGRAM(S): at 21:29

## 2018-10-21 RX ADMIN — LEVETIRACETAM 500 MILLIGRAM(S): 250 TABLET, FILM COATED ORAL at 06:35

## 2018-10-21 RX ADMIN — HYDROMORPHONE HYDROCHLORIDE 1 MILLIGRAM(S): 2 INJECTION INTRAMUSCULAR; INTRAVENOUS; SUBCUTANEOUS at 06:33

## 2018-10-21 RX ADMIN — HYDROMORPHONE HYDROCHLORIDE 1 MILLIGRAM(S): 2 INJECTION INTRAMUSCULAR; INTRAVENOUS; SUBCUTANEOUS at 02:20

## 2018-10-21 RX ADMIN — HYDROMORPHONE HYDROCHLORIDE 1 MILLIGRAM(S): 2 INJECTION INTRAMUSCULAR; INTRAVENOUS; SUBCUTANEOUS at 23:35

## 2018-10-21 RX ADMIN — MEROPENEM 200 MILLIGRAM(S): 1 INJECTION INTRAVENOUS at 06:35

## 2018-10-21 RX ADMIN — PANTOPRAZOLE SODIUM 40 MILLIGRAM(S): 20 TABLET, DELAYED RELEASE ORAL at 06:36

## 2018-10-21 RX ADMIN — HYDROMORPHONE HYDROCHLORIDE 1 MILLIGRAM(S): 2 INJECTION INTRAMUSCULAR; INTRAVENOUS; SUBCUTANEOUS at 07:15

## 2018-10-21 NOTE — PROGRESS NOTE ADULT - ASSESSMENT
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 Now being treated with Inatuzumab day 1, 8, 15. The patients hospital course has been complicated by SDH, subconjunctival hemorrhage, neutropenic fevers, transaminitis and upper GIB. The patient has pancytopenia 2/2 disease and/or chemotherapy

## 2018-10-21 NOTE — PROGRESS NOTE ADULT - PROBLEM SELECTOR PLAN 1
10/5 Peripheral flow confirms relapse with BCR/ABL rearrangement in 79.5% of cells  Continue Inotuzamab (Day 1, 8 and 15)  For BM bx after second cycle  Monitor CBC/Lytes and transfuse/replete PRN  Thrombocytopenia with SDH: Patient platelet refractory, per blood bank patient to receive 1/2 bags over 3 hours q 12 hours. HLA is not available today   Hypophosphatemia: K phos 15 mmol IV x 1    Strict Is and Os/Daily weights/Mouth Care  Antiemetics  IVF

## 2018-10-21 NOTE — PROGRESS NOTE ADULT - ATTENDING COMMENTS
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 FISH and PCR negative. s/p Masha-Auto on 12/16/16. Pt was on sprycel maintenance. 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  Now getting Inotuzumab C1 day 13.    -Afebrile today. BCx/UCx negative 10/13, 10/16.  CT c/a/p 10/16- Scattered and patchy groundglass opacities in the right upper and lower lobes, possibly infection.  Heterogeneous thickening of the endometrial cavity.  Continue vanco, meropenem, mycamine.    - goal plt ct >80K, but plt are refractory- transfuse 1/2 units over 3 hours twice a day. Keep hb >7.   - cont keppra px.  Repeat head CT stable 10/11.  No ommaya tap until her plt >50.   - GI bleed has resolved, continue po protonix.  - OOB, ambulation. 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 FISH and PCR negative. s/p Masha-Auto on 12/16/16. Pt was on sprycel maintenance. 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  Now getting Inotuzumab C1 day 14.    -Afebrile today. BCx/UCx negative 10/13, 10/16.  CT c/a/p 10/16- Scattered and patchy groundglass opacities in the right upper and lower lobes, possibly infection.  Heterogeneous thickening of the endometrial cavity.  Continue vanco, meropenem, mycamine.    - goal plt ct >80K, but plt are refractory- transfuse 1/2 units over 3 hours twice a day. Keep hb >7.   - cont keppra px.  Repeat head CT stable 10/11, decreased conspicuity of hematomas on 10/16.  No ommaya tap until her plt >50.   - GI bleed has resolved, continue po protonix.  - OOB, ambulation.

## 2018-10-21 NOTE — PROGRESS NOTE ADULT - PROBLEM SELECTOR PLAN 3
10/4 CT Head revealed small bilateral acute on chronic convexity subdural hematomas. Repeat CT Head on 10/5, 10/6 and 10/11 is stable with 10/16 results showing SDH decreasing in size  Continue Keppra 500mg BID for seizure ppx   Keep PLT >80K  Neuro checks q4 hrs  Pain management   Neurosurgery following, no intervention at this time. Continue to keep platelet goal above 80K and do not tap Omaya until platelets are at a minimum of 50K as SDH may increase

## 2018-10-21 NOTE — PROGRESS NOTE ADULT - PROBLEM SELECTOR PLAN 2
Pt is neutropenic, afebrile   If febrile Pan Cx and CXR  Continue Vancomycin and Mycamine for ppx  10/16 CT C/A/P with Scattered and patchy ground-glass opacities in the right upper and lower lobes, possibly infection. Broadened Cefepime to Meropenem on 10/17

## 2018-10-21 NOTE — PROGRESS NOTE ADULT - SUBJECTIVE AND OBJECTIVE BOX
Diagnosis:    Protocol/Chemo Regimen:    Day:     Pt endorsed:    Review of Systems:     Pain scale:     Diet:     Allergies    No Known Drug Allergies  shellfish (Nausea; Vomiting)    Intolerances        ANTIMICROBIALS  meropenem  IVPB      meropenem  IVPB 2000 milliGRAM(s) IV Intermittent every 8 hours  micafungin IVPB 100 milliGRAM(s) IV Intermittent daily  vancomycin  IVPB      vancomycin  IVPB 1000 milliGRAM(s) IV Intermittent every 8 hours      HEME/ONC MEDICATIONS      STANDING MEDICATIONS  influenza   Vaccine 0.5 milliLiter(s) IntraMuscular once  levETIRAcetam 500 milliGRAM(s) Oral two times a day  multivitamin 1 Tablet(s) Oral daily  pantoprazole    Tablet 40 milliGRAM(s) Oral before breakfast  potassium phosphate IVPB 15 milliMole(s) IV Intermittent once  sodium chloride 0.9% lock flush 20 milliLiter(s) IV Push once  sodium chloride 0.9%. 1000 milliLiter(s) IV Continuous <Continuous>      PRN MEDICATIONS  acetaminophen   Tablet .. 650 milliGRAM(s) Oral every 6 hours PRN  acetaminophen   Tablet .. 650 milliGRAM(s) Oral every 6 hours PRN  HYDROmorphone  Injectable 0.5 milliGRAM(s) IV Push every 2 hours PRN  HYDROmorphone  Injectable 1 milliGRAM(s) IV Push every 4 hours PRN  metoclopramide Injectable 10 milliGRAM(s) IV Push every 6 hours PRN  sodium chloride 0.9% lock flush 10 milliLiter(s) IV Push every 1 hour PRN  sodium chloride 0.9% lock flush 10 milliLiter(s) IV Push every 12 hours PRN        Vital Signs Last 24 Hrs  T(C): 37 (21 Oct 2018 05:39), Max: 37.6 (20 Oct 2018 23:30)  T(F): 98.6 (21 Oct 2018 05:39), Max: 99.6 (20 Oct 2018 23:30)  HR: 86 (21 Oct 2018 05:39) (76 - 86)  BP: 152/99 (21 Oct 2018 05:39) (117/72 - 152/99)  BP(mean): --  RR: 18 (21 Oct 2018 05:39) (18 - 18)  SpO2: 96% (21 Oct 2018 05:39) (96% - 100%)    PHYSICAL EXAM  General: NAD  HEENT: PERRLA, EOMOI, clear oropharynx, anicteric sclera, pink conjunctiva  Neck: supple  CV: (+) S1/S2 RRR  Lungs: clear to auscultation, no wheezes or rales  Abdomen: soft, non-tender, non-distended (+) BS  Ext: no clubbing, cyanosis or edema  Skin: no rashes and no petechiae  Neuro: alert and oriented X 3, no focal deficits  Central Line:     RECENT CULTURES:  10-16 @ 13:21  .Blood Blood-Peripheral  --  --  --    No growth to date.  --  10-16 @ 13:20  .Blood PICC/PERC Double Lumen BLUE  --  --  --    No growth to date.  --  10-16 @ 13:01  .Urine Clean Catch (Midstream)  --  --  --    No growth  --        LABS:                        8.1    0.7   )-----------( 4        ( 21 Oct 2018 07:24 )             23.2         Mean Cell Volume : 88.1 fl  Mean Cell Hemoglobin : 30.7 pg  Mean Cell Hemoglobin Concentration : 34.9 gm/dL  Auto Neutrophil # : x  Auto Lymphocyte # : x  Auto Monocyte # : x  Auto Eosinophil # : x  Auto Basophil # : x  Auto Neutrophil % : x  Auto Lymphocyte % : x  Auto Monocyte % : x  Auto Eosinophil % : x  Auto Basophil % : x      10-21    138  |  103  |  10  ----------------------------<  99  4.1   |  25  |  0.57    Ca    9.6      21 Oct 2018 07:24  Phos  2.2     10-21  Mg     2.4     10-21    TPro  6.1  /  Alb  3.4  /  TBili  0.6  /  DBili  x   /  AST  14  /  ALT  24  /  AlkPhos  159<H>  10-21      Mg 2.4  Phos 2.2            Uric Acid 1.3        RADIOLOGY & ADDITIONAL STUDIES: Diagnosis: Relapsed ALL Ph (+)     Protocol/Chemo Regimen: Inotuzamab Day 1,8 and 15    Day: 14    Pt endorsed: Headache stable with pain medications    Review of Systems: Patient denies dizziness, visual changes, chest pain, palpitations, SOB, abdominal pain, vomiting, diarrhea or dysuria.     Pain scale: 0    Diet: Regular    Allergies: No Known Drug Allergies  shellfish (Nausea; Vomiting)      ANTIMICROBIALS      meropenem  IVPB 2000 milliGRAM(s) IV Intermittent every 8 hours  micafungin IVPB 100 milliGRAM(s) IV Intermittent daily   vancomycin  IVPB 1000 milliGRAM(s) IV Intermittent every 8 hours      STANDING MEDICATIONS  influenza   Vaccine 0.5 milliLiter(s) IntraMuscular once  levETIRAcetam 500 milliGRAM(s) Oral two times a day  multivitamin 1 Tablet(s) Oral daily  pantoprazole    Tablet 40 milliGRAM(s) Oral before breakfast  potassium phosphate IVPB 15 milliMole(s) IV Intermittent once  sodium chloride 0.9% lock flush 20 milliLiter(s) IV Push once  sodium chloride 0.9%. 1000 milliLiter(s) IV Continuous <Continuous>      PRN MEDICATIONS  acetaminophen   Tablet .. 650 milliGRAM(s) Oral every 6 hours PRN  acetaminophen   Tablet .. 650 milliGRAM(s) Oral every 6 hours PRN  HYDROmorphone  Injectable 0.5 milliGRAM(s) IV Push every 2 hours PRN  HYDROmorphone  Injectable 1 milliGRAM(s) IV Push every 4 hours PRN  metoclopramide Injectable 10 milliGRAM(s) IV Push every 6 hours PRN  sodium chloride 0.9% lock flush 10 milliLiter(s) IV Push every 1 hour PRN  sodium chloride 0.9% lock flush 10 milliLiter(s) IV Push every 12 hours PRN      Vital Signs Last 24 Hrs  T(C): 37 (21 Oct 2018 05:39), Max: 37.6 (20 Oct 2018 23:30)  T(F): 98.6 (21 Oct 2018 05:39), Max: 99.6 (20 Oct 2018 23:30)  HR: 86 (21 Oct 2018 05:39) (76 - 86)  BP: 152/99 (21 Oct 2018 05:39) (117/72 - 152/99)  BP(mean): --  RR: 18 (21 Oct 2018 05:39) (18 - 18)  SpO2: 96% (21 Oct 2018 05:39) (96% - 100%)      PHYSICAL EXAM  General: NAD  HEENT: PERRLA, EOMI, clear oropharynx, anicteric sclera, pink conjunctiva  Neck: supple  CV: (+) S1/S2 RRR  Lungs: clear to auscultation, no wheezes or rales  Abdomen: soft, non-tender, non-distended (+) BS  Ext: no clubbing, cyanosis or edema  Skin: no rashes. Generalized ecchymosis  Neuro: alert and oriented X 3, no focal deficits  Central Line: C/D/I       LABS:                        8.1    0.7   )-----------( 4        ( 21 Oct 2018 07:24 )             23.2         Mean Cell Volume : 88.1 fl  Mean Cell Hemoglobin : 30.7 pg  Mean Cell Hemoglobin Concentration : 34.9 gm/dL  Auto Neutrophil # : x  Auto Lymphocyte # : x  Auto Monocyte # : x  Auto Eosinophil # : x  Auto Basophil # : x  Auto Neutrophil % : x  Auto Lymphocyte % : x  Auto Monocyte % : x  Auto Eosinophil % : x  Auto Basophil % : x      10-21    138  |  103  |  10  ----------------------------<  99  4.1   |  25  |  0.57    Ca    9.6      21 Oct 2018 07:24  Phos  2.2     10-21  Mg     2.4     10-21    TPro  6.1  /  Alb  3.4  /  TBili  0.6  /  DBili  x   /  AST  14  /  ALT  24  /  AlkPhos  159<H>  10-21      Mg 2.4  Phos 2.2      Uric Acid 1.3        RECENT CULTURES:    Culture - Blood (10.16.18 @ 13:21)    Specimen Source: .Blood Blood-Peripheral    Culture Results:   No growth to date.    Culture - Blood (10.16.18 @ 13:20)    Specimen Source: .Blood PICC/PERC Double Lumen BLUE    Culture Results:   No growth to date.    Culture - Urine (10.13.18 @ 03:01)    Specimen Source: .Urine Clean Catch (Midstream)    Culture Results:   No growth      RADIOLOGY & ADDITIONAL STUDIES:    EXAM:  CT ABDOMEN AND PELVIS OC IC/ CT CHEST IC                        PROCEDURE DATE:  10/16/2018    IMPRESSION: Scattered and patchy groundglass opacities in the right upper and lower   lobes, possibly infection.   Heterogeneous thickening of the endometrial cavity, measuring 1.3 cm, for   which further evaluation with pelvic ultrasound is recommended.      EXAM:  CT BRAIN                        PROCEDURE DATE:  10/16/2018    IMPRESSION:  Decreased conspicuity to posterior interhemispheric and left   tentorial subdural hematoma compared with the prior 10/11/2018. Right   frontal ventricular catheter with its tip in the region of the right   foramen of Stephan.

## 2018-10-22 LAB
ALBUMIN SERPL ELPH-MCNC: 3.4 G/DL — SIGNIFICANT CHANGE UP (ref 3.3–5)
ALP SERPL-CCNC: 157 U/L — HIGH (ref 40–120)
ALT FLD-CCNC: 22 U/L — SIGNIFICANT CHANGE UP (ref 10–45)
ANION GAP SERPL CALC-SCNC: 9 MMOL/L — SIGNIFICANT CHANGE UP (ref 5–17)
APTT BLD: 28.6 SEC — SIGNIFICANT CHANGE UP (ref 27.5–37.4)
AST SERPL-CCNC: 12 U/L — SIGNIFICANT CHANGE UP (ref 10–40)
BASOPHILS # BLD AUTO: 0 K/UL — SIGNIFICANT CHANGE UP (ref 0–0.2)
BASOPHILS NFR BLD AUTO: 0 % — SIGNIFICANT CHANGE UP (ref 0–2)
BILIRUB SERPL-MCNC: 0.6 MG/DL — SIGNIFICANT CHANGE UP (ref 0.2–1.2)
BLD GP AB SCN SERPL QL: POSITIVE — SIGNIFICANT CHANGE UP
BUN SERPL-MCNC: 10 MG/DL — SIGNIFICANT CHANGE UP (ref 7–23)
CALCIUM SERPL-MCNC: 9.8 MG/DL — SIGNIFICANT CHANGE UP (ref 8.4–10.5)
CHLORIDE SERPL-SCNC: 103 MMOL/L — SIGNIFICANT CHANGE UP (ref 96–108)
CO2 SERPL-SCNC: 26 MMOL/L — SIGNIFICANT CHANGE UP (ref 22–31)
CREAT SERPL-MCNC: 0.57 MG/DL — SIGNIFICANT CHANGE UP (ref 0.5–1.3)
DAT POLY-SP REAG RBC QL: NEGATIVE — SIGNIFICANT CHANGE UP
EOSINOPHIL # BLD AUTO: 0 K/UL — SIGNIFICANT CHANGE UP (ref 0–0.5)
EOSINOPHIL NFR BLD AUTO: 0.4 % — SIGNIFICANT CHANGE UP (ref 0–6)
FIBRINOGEN PPP-MCNC: 618 MG/DL — HIGH (ref 310–510)
GLUCOSE SERPL-MCNC: 101 MG/DL — HIGH (ref 70–99)
HCT VFR BLD CALC: 24 % — LOW (ref 34.5–45)
HGB BLD-MCNC: 8.3 G/DL — LOW (ref 11.5–15.5)
INR BLD: 0.95 RATIO — SIGNIFICANT CHANGE UP (ref 0.88–1.16)
LDH SERPL L TO P-CCNC: 239 U/L — SIGNIFICANT CHANGE UP (ref 50–242)
LYMPHOCYTES # BLD AUTO: 0.8 K/UL — LOW (ref 1–3.3)
LYMPHOCYTES # BLD AUTO: 97.7 % — HIGH (ref 13–44)
MAGNESIUM SERPL-MCNC: 2.3 MG/DL — SIGNIFICANT CHANGE UP (ref 1.6–2.6)
MCHC RBC-ENTMCNC: 30.6 PG — SIGNIFICANT CHANGE UP (ref 27–34)
MCHC RBC-ENTMCNC: 34.6 GM/DL — SIGNIFICANT CHANGE UP (ref 32–36)
MCV RBC AUTO: 88.4 FL — SIGNIFICANT CHANGE UP (ref 80–100)
MONOCYTES # BLD AUTO: 0 K/UL — SIGNIFICANT CHANGE UP (ref 0–0.9)
MONOCYTES NFR BLD AUTO: 0.6 % — LOW (ref 2–14)
NEUTROPHILS # BLD AUTO: 0 K/UL — LOW (ref 1.8–7.4)
NEUTROPHILS NFR BLD AUTO: 1.3 % — LOW (ref 43–77)
PHOSPHATE SERPL-MCNC: 2.1 MG/DL — LOW (ref 2.5–4.5)
PLATELET # BLD AUTO: 3 K/UL — CRITICAL LOW (ref 150–400)
POTASSIUM SERPL-MCNC: 4.2 MMOL/L — SIGNIFICANT CHANGE UP (ref 3.5–5.3)
POTASSIUM SERPL-SCNC: 4.2 MMOL/L — SIGNIFICANT CHANGE UP (ref 3.5–5.3)
PROT SERPL-MCNC: 6.1 G/DL — SIGNIFICANT CHANGE UP (ref 6–8.3)
PROTHROM AB SERPL-ACNC: 10.2 SEC — SIGNIFICANT CHANGE UP (ref 9.8–12.7)
RBC # BLD: 2.71 M/UL — LOW (ref 3.8–5.2)
RBC # FLD: 15.3 % — HIGH (ref 10.3–14.5)
RH IG SCN BLD-IMP: POSITIVE — SIGNIFICANT CHANGE UP
SODIUM SERPL-SCNC: 138 MMOL/L — SIGNIFICANT CHANGE UP (ref 135–145)
URATE SERPL-MCNC: 1.8 MG/DL — LOW (ref 2.5–7)
WBC # BLD: 0.8 K/UL — CRITICAL LOW (ref 3.8–10.5)
WBC # FLD AUTO: 0.8 K/UL — CRITICAL LOW (ref 3.8–10.5)

## 2018-10-22 PROCEDURE — 99232 SBSQ HOSP IP/OBS MODERATE 35: CPT

## 2018-10-22 PROCEDURE — 99222 1ST HOSP IP/OBS MODERATE 55: CPT

## 2018-10-22 RX ORDER — INOTUZUMAB OZOGAMICIN 0.25 MG/ML
1.08 INJECTION, POWDER, LYOPHILIZED, FOR SOLUTION INTRAVENOUS ONCE
Qty: 0 | Refills: 0 | Status: COMPLETED | OUTPATIENT
Start: 2018-10-22 | End: 2018-10-22

## 2018-10-22 RX ORDER — ACETAMINOPHEN 500 MG
650 TABLET ORAL ONCE
Qty: 0 | Refills: 0 | Status: COMPLETED | OUTPATIENT
Start: 2018-10-22 | End: 2018-10-22

## 2018-10-22 RX ORDER — POTASSIUM PHOSPHATE, MONOBASIC POTASSIUM PHOSPHATE, DIBASIC 236; 224 MG/ML; MG/ML
15 INJECTION, SOLUTION INTRAVENOUS ONCE
Qty: 0 | Refills: 0 | Status: COMPLETED | OUTPATIENT
Start: 2018-10-22 | End: 2018-10-22

## 2018-10-22 RX ORDER — DIPHENHYDRAMINE HCL 50 MG
50 CAPSULE ORAL ONCE
Qty: 0 | Refills: 0 | Status: COMPLETED | OUTPATIENT
Start: 2018-10-22 | End: 2018-10-22

## 2018-10-22 RX ORDER — MEROPENEM 1 G/30ML
1000 INJECTION INTRAVENOUS EVERY 8 HOURS
Qty: 0 | Refills: 0 | Status: DISCONTINUED | OUTPATIENT
Start: 2018-10-22 | End: 2018-11-02

## 2018-10-22 RX ORDER — HYDROCORTISONE 20 MG
100 TABLET ORAL ONCE
Qty: 0 | Refills: 0 | Status: COMPLETED | OUTPATIENT
Start: 2018-10-22 | End: 2018-10-22

## 2018-10-22 RX ADMIN — HYDROMORPHONE HYDROCHLORIDE 1 MILLIGRAM(S): 2 INJECTION INTRAMUSCULAR; INTRAVENOUS; SUBCUTANEOUS at 20:26

## 2018-10-22 RX ADMIN — MEROPENEM 200 MILLIGRAM(S): 1 INJECTION INTRAVENOUS at 13:50

## 2018-10-22 RX ADMIN — Medication 1 TABLET(S): at 12:02

## 2018-10-22 RX ADMIN — LEVETIRACETAM 500 MILLIGRAM(S): 250 TABLET, FILM COATED ORAL at 05:09

## 2018-10-22 RX ADMIN — HYDROMORPHONE HYDROCHLORIDE 1 MILLIGRAM(S): 2 INJECTION INTRAMUSCULAR; INTRAVENOUS; SUBCUTANEOUS at 20:06

## 2018-10-22 RX ADMIN — Medication 250 MILLIGRAM(S): at 05:08

## 2018-10-22 RX ADMIN — MEROPENEM 200 MILLIGRAM(S): 1 INJECTION INTRAVENOUS at 21:21

## 2018-10-22 RX ADMIN — SODIUM CHLORIDE 50 MILLILITER(S): 9 INJECTION INTRAMUSCULAR; INTRAVENOUS; SUBCUTANEOUS at 07:55

## 2018-10-22 RX ADMIN — Medication 650 MILLIGRAM(S): at 13:53

## 2018-10-22 RX ADMIN — HYDROMORPHONE HYDROCHLORIDE 1 MILLIGRAM(S): 2 INJECTION INTRAMUSCULAR; INTRAVENOUS; SUBCUTANEOUS at 08:25

## 2018-10-22 RX ADMIN — HYDROMORPHONE HYDROCHLORIDE 1 MILLIGRAM(S): 2 INJECTION INTRAMUSCULAR; INTRAVENOUS; SUBCUTANEOUS at 03:47

## 2018-10-22 RX ADMIN — HYDROMORPHONE HYDROCHLORIDE 1 MILLIGRAM(S): 2 INJECTION INTRAMUSCULAR; INTRAVENOUS; SUBCUTANEOUS at 12:32

## 2018-10-22 RX ADMIN — MEROPENEM 200 MILLIGRAM(S): 1 INJECTION INTRAVENOUS at 06:19

## 2018-10-22 RX ADMIN — HYDROMORPHONE HYDROCHLORIDE 1 MILLIGRAM(S): 2 INJECTION INTRAMUSCULAR; INTRAVENOUS; SUBCUTANEOUS at 16:06

## 2018-10-22 RX ADMIN — HYDROMORPHONE HYDROCHLORIDE 1 MILLIGRAM(S): 2 INJECTION INTRAMUSCULAR; INTRAVENOUS; SUBCUTANEOUS at 04:03

## 2018-10-22 RX ADMIN — MICAFUNGIN SODIUM 105 MILLIGRAM(S): 100 INJECTION, POWDER, LYOPHILIZED, FOR SOLUTION INTRAVENOUS at 12:02

## 2018-10-22 RX ADMIN — Medication 50 MILLIGRAM(S): at 13:53

## 2018-10-22 RX ADMIN — PANTOPRAZOLE SODIUM 40 MILLIGRAM(S): 20 TABLET, DELAYED RELEASE ORAL at 05:09

## 2018-10-22 RX ADMIN — SODIUM CHLORIDE 50 MILLILITER(S): 9 INJECTION INTRAMUSCULAR; INTRAVENOUS; SUBCUTANEOUS at 05:09

## 2018-10-22 RX ADMIN — HYDROMORPHONE HYDROCHLORIDE 1 MILLIGRAM(S): 2 INJECTION INTRAMUSCULAR; INTRAVENOUS; SUBCUTANEOUS at 07:55

## 2018-10-22 RX ADMIN — POTASSIUM PHOSPHATE, MONOBASIC POTASSIUM PHOSPHATE, DIBASIC 62.5 MILLIMOLE(S): 236; 224 INJECTION, SOLUTION INTRAVENOUS at 07:56

## 2018-10-22 RX ADMIN — LEVETIRACETAM 500 MILLIGRAM(S): 250 TABLET, FILM COATED ORAL at 17:39

## 2018-10-22 RX ADMIN — Medication 100 MILLIGRAM(S): at 13:53

## 2018-10-22 RX ADMIN — HYDROMORPHONE HYDROCHLORIDE 1 MILLIGRAM(S): 2 INJECTION INTRAMUSCULAR; INTRAVENOUS; SUBCUTANEOUS at 16:36

## 2018-10-22 RX ADMIN — HYDROMORPHONE HYDROCHLORIDE 1 MILLIGRAM(S): 2 INJECTION INTRAMUSCULAR; INTRAVENOUS; SUBCUTANEOUS at 12:02

## 2018-10-22 RX ADMIN — INOTUZUMAB OZOGAMICIN 50 MILLIGRAM(S): 0.25 INJECTION, POWDER, LYOPHILIZED, FOR SOLUTION INTRAVENOUS at 15:19

## 2018-10-22 NOTE — PROGRESS NOTE ADULT - ASSESSMENT
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 Now being treated with Inatuzumab day 1, 8, 15. The patients hospital course has been complicated by SDH, subconjunctival hemorrhage, neutropenic fevers, transaminitis and upper GIB. The patient has pancytopenia 2/2 disease and/or chemotherapy

## 2018-10-22 NOTE — CONSULT NOTE ADULT - ASSESSMENT
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 FISH and PCR negative. s/p Masha-Auto on 12/16/16. 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   Neutropenic fever  Abnormal CT chest  All cultures negative to date.       Plan:   Continue with Meropenem 1 gm iv q8h  Stopped Vancomycin.   On micafungin for prophylaxis.   Fever coinciding with chemo administration.   CT chest non specific, pt asymptomatic and afebrile right now.

## 2018-10-22 NOTE — PROGRESS NOTE ADULT - SUBJECTIVE AND OBJECTIVE BOX
Diagnosis: Relapsed ALL Ph (+)     Protocol/Chemo Regimen: Inotuzamab Day 1,8 and 15    Day: 15    Pt endorsed: Headache stable with pain medications    Review of Systems: Patient denies dizziness, visual changes, chest pain, palpitations, SOB, abdominal pain, vomiting, diarrhea or dysuria.     Pain scale: 0    Diet: Regular    Allergies: No Known Drug Allergies  shellfish (Nausea; Vomiting)      ANTIMICROBIALS   meropenem  IVPB 2000 milliGRAM(s) IV Intermittent every 8 hours  micafungin IVPB 100 milliGRAM(s) IV Intermittent daily    vancomycin  IVPB 1000 milliGRAM(s) IV Intermittent every 8 hours        STANDING MEDICATIONS  influenza   Vaccine 0.5 milliLiter(s) IntraMuscular once  levETIRAcetam 500 milliGRAM(s) Oral two times a day  multivitamin 1 Tablet(s) Oral daily  pantoprazole    Tablet 40 milliGRAM(s) Oral before breakfast  sodium chloride 0.9% lock flush 20 milliLiter(s) IV Push once  sodium chloride 0.9%. 1000 milliLiter(s) IV Continuous <Continuous>      PRN MEDICATIONS  acetaminophen   Tablet .. 650 milliGRAM(s) Oral every 6 hours PRN  acetaminophen   Tablet .. 650 milliGRAM(s) Oral every 6 hours PRN  HYDROmorphone  Injectable 0.5 milliGRAM(s) IV Push every 2 hours PRN  HYDROmorphone  Injectable 1 milliGRAM(s) IV Push every 4 hours PRN  metoclopramide Injectable 10 milliGRAM(s) IV Push every 6 hours PRN  sodium chloride 0.9% lock flush 10 milliLiter(s) IV Push every 1 hour PRN  sodium chloride 0.9% lock flush 10 milliLiter(s) IV Push every 12 hours PRN      Vital Signs Last 24 Hrs  T(C): 37.1 (22 Oct 2018 06:01), Max: 37.3 (21 Oct 2018 21:05)  T(F): 98.8 (22 Oct 2018 06:01), Max: 99.1 (21 Oct 2018 21:05)  HR: 80 (22 Oct 2018 06:01) (77 - 89)  BP: 127/83 (22 Oct 2018 06:01) (127/83 - 151/71)  BP(mean): --  RR: 18 (22 Oct 2018 06:01) (18 - 18)  SpO2: 98% (22 Oct 2018 06:01) (97% - 99%)      PHYSICAL EXAM  General: NAD  HEENT: PERRLA, EOMI, clear oropharynx, anicteric sclera, pink conjunctiva  Neck: supple  CV: (+) S1/S2 RRR  Lungs: clear to auscultation, no wheezes or rales  Abdomen: soft, non-tender, non-distended (+) BS  Ext: no clubbing, cyanosis or edema  Skin: no rashes. Generalized ecchymosis  Neuro: alert and oriented X 3, no focal deficits  Central Line: C/D/I      LABS:                   RECENT CULTURES:    Culture - Blood (10.16.18 @ 13:21)    Specimen Source: .Blood Blood-Peripheral    Culture Results:   No growth to date.    Culture - Blood (10.16.18 @ 13:20)    Specimen Source: .Blood PICC/PERC Double Lumen BLUE    Culture Results:   No growth to date.    Culture - Urine (10.13.18 @ 03:01)    Specimen Source: .Urine Clean Catch (Midstream)    Culture Results:   No growth      RADIOLOGY & ADDITIONAL STUDIES:    EXAM:  CT ABDOMEN AND PELVIS OC IC/ CT CHEST IC                        PROCEDURE DATE:  10/16/2018    IMPRESSION: Scattered and patchy groundglass opacities in the right upper and lower   lobes, possibly infection.   Heterogeneous thickening of the endometrial cavity, measuring 1.3 cm, for   which further evaluation with pelvic ultrasound is recommended.      EXAM:  CT BRAIN                        PROCEDURE DATE:  10/16/2018    IMPRESSION:  Decreased conspicuity to posterior interhemispheric and left   tentorial subdural hematoma compared with the prior 10/11/2018. Right   frontal ventricular catheter with its tip in the region of the right   foramen of Stephan. Diagnosis: Relapsed ALL Ph (+)     Protocol/Chemo Regimen: Inotuzamab Day 1,8 and 15    Day: 15    Pt endorsed: Headache improved with pain medications, no further nausea and able to eat    Review of Systems: Patient denies dizziness, visual changes, chest pain, palpitations, SOB, abdominal pain, nausea, vomiting, diarrhea or dysuria.     Pain scale: 0    Diet: Regular    Allergies: No Known Drug Allergies  shellfish (Nausea; Vomiting)      ANTIMICROBIALS   meropenem  IVPB 2000 milliGRAM(s) IV Intermittent every 8 hours  micafungin IVPB 100 milliGRAM(s) IV Intermittent daily    vancomycin  IVPB 1000 milliGRAM(s) IV Intermittent every 8 hours        STANDING MEDICATIONS  influenza   Vaccine 0.5 milliLiter(s) IntraMuscular once  levETIRAcetam 500 milliGRAM(s) Oral two times a day  multivitamin 1 Tablet(s) Oral daily  pantoprazole    Tablet 40 milliGRAM(s) Oral before breakfast  sodium chloride 0.9% lock flush 20 milliLiter(s) IV Push once  sodium chloride 0.9%. 1000 milliLiter(s) IV Continuous <Continuous>      PRN MEDICATIONS  acetaminophen   Tablet .. 650 milliGRAM(s) Oral every 6 hours PRN  acetaminophen   Tablet .. 650 milliGRAM(s) Oral every 6 hours PRN  HYDROmorphone  Injectable 0.5 milliGRAM(s) IV Push every 2 hours PRN  HYDROmorphone  Injectable 1 milliGRAM(s) IV Push every 4 hours PRN  metoclopramide Injectable 10 milliGRAM(s) IV Push every 6 hours PRN  sodium chloride 0.9% lock flush 10 milliLiter(s) IV Push every 1 hour PRN  sodium chloride 0.9% lock flush 10 milliLiter(s) IV Push every 12 hours PRN      Vital Signs Last 24 Hrs  T(C): 37.1 (22 Oct 2018 06:01), Max: 37.3 (21 Oct 2018 21:05)  T(F): 98.8 (22 Oct 2018 06:01), Max: 99.1 (21 Oct 2018 21:05)  HR: 80 (22 Oct 2018 06:01) (77 - 89)  BP: 127/83 (22 Oct 2018 06:01) (127/83 - 151/71)  BP(mean): --  RR: 18 (22 Oct 2018 06:01) (18 - 18)  SpO2: 98% (22 Oct 2018 06:01) (97% - 99%)      PHYSICAL EXAM  General: NAD  HEENT: PERRLA, EOMI, clear oropharynx, anicteric sclera, pink conjunctiva  Neck: supple  CV: (+) S1/S2 RRR  Lungs: clear to auscultation, no wheezes or rales  Abdomen: soft, non-tender, non-distended (+) BS  Ext: no clubbing, cyanosis or edema  Skin: no rashes. Generalized ecchymosis  Neuro: alert and oriented X 3, no focal deficits  Central Line: C/D/I      LABS:                                 8.3    0.8   )-----------( 3        ( 22 Oct 2018 06:54 )             24.0         Mean Cell Volume : 88.4 fl  Mean Cell Hemoglobin : 30.6 pg  Mean Cell Hemoglobin Concentration : 34.6 gm/dL  Auto Neutrophil # : 0.0 K/uL  Auto Lymphocyte # : 0.8 K/uL  Auto Monocyte # : 0.0 K/uL  Auto Eosinophil # : 0.0 K/uL  Auto Basophil # : 0.0 K/uL  Auto Neutrophil % : 1.3 %  Auto Lymphocyte % : 97.7 %  Auto Monocyte % : 0.6 %  Auto Eosinophil % : 0.4 %  Auto Basophil % : 0.0 %      10-22    138  |  103  |  10  ----------------------------<  101<H>  4.2   |  26  |  0.57    Ca    9.8      22 Oct 2018 06:54  Phos  2.1     10-22  Mg     2.3     10-22    TPro  6.1  /  Alb  3.4  /  TBili  0.6  /  DBili  x   /  AST  12  /  ALT  22  /  AlkPhos  157<H>  10-22      Mg 2.3  Phos 2.1      PT/INR - ( 22 Oct 2018 06:54 )   PT: 10.2 sec;   INR: 0.95 ratio         PTT - ( 22 Oct 2018 06:54 )  PTT:28.6 sec      Uric Acid 1.8        RECENT CULTURES:    Culture - Blood (10.16.18 @ 13:21)    Specimen Source: .Blood Blood-Peripheral    Culture Results:   No growth to date.    Culture - Blood (10.16.18 @ 13:20)    Specimen Source: .Blood PICC/PERC Double Lumen BLUE    Culture Results:   No growth to date.    Culture - Urine (10.13.18 @ 03:01)    Specimen Source: .Urine Clean Catch (Midstream)    Culture Results:   No growth      RADIOLOGY & ADDITIONAL STUDIES:    EXAM:  CT ABDOMEN AND PELVIS OC IC/ CT CHEST IC                        PROCEDURE DATE:  10/16/2018    IMPRESSION: Scattered and patchy groundglass opacities in the right upper and lower   lobes, possibly infection.   Heterogeneous thickening of the endometrial cavity, measuring 1.3 cm, for   which further evaluation with pelvic ultrasound is recommended.      EXAM:  CT BRAIN                        PROCEDURE DATE:  10/16/2018    IMPRESSION:  Decreased conspicuity to posterior interhemispheric and left   tentorial subdural hematoma compared with the prior 10/11/2018. Right   frontal ventricular catheter with its tip in the region of the right   foramen of Stephan. Diagnosis: Relapsed ALL Ph (+)     Protocol/Chemo Regimen: Inotuzamab Day 1,8 and 15    Day: 15    Pt endorsed: Headache improved with pain medications, appetite improving everyday    Review of Systems: Patient denies dizziness, visual changes, chest pain, palpitations, SOB, abdominal pain, nausea, vomiting, diarrhea or dysuria.     Pain scale: 0    Diet: Regular    Allergies: No Known Drug Allergies  shellfish (Nausea; Vomiting)      ANTIMICROBIALS   meropenem  IVPB 2000 milliGRAM(s) IV Intermittent every 8 hours  micafungin IVPB 100 milliGRAM(s) IV Intermittent daily    vancomycin  IVPB 1000 milliGRAM(s) IV Intermittent every 8 hours        STANDING MEDICATIONS  influenza   Vaccine 0.5 milliLiter(s) IntraMuscular once  levETIRAcetam 500 milliGRAM(s) Oral two times a day  multivitamin 1 Tablet(s) Oral daily  pantoprazole    Tablet 40 milliGRAM(s) Oral before breakfast  sodium chloride 0.9% lock flush 20 milliLiter(s) IV Push once  sodium chloride 0.9%. 1000 milliLiter(s) IV Continuous <Continuous>      PRN MEDICATIONS  acetaminophen   Tablet .. 650 milliGRAM(s) Oral every 6 hours PRN  acetaminophen   Tablet .. 650 milliGRAM(s) Oral every 6 hours PRN  HYDROmorphone  Injectable 0.5 milliGRAM(s) IV Push every 2 hours PRN  HYDROmorphone  Injectable 1 milliGRAM(s) IV Push every 4 hours PRN  metoclopramide Injectable 10 milliGRAM(s) IV Push every 6 hours PRN  sodium chloride 0.9% lock flush 10 milliLiter(s) IV Push every 1 hour PRN  sodium chloride 0.9% lock flush 10 milliLiter(s) IV Push every 12 hours PRN      Vital Signs Last 24 Hrs  T(C): 37.1 (22 Oct 2018 06:01), Max: 37.3 (21 Oct 2018 21:05)  T(F): 98.8 (22 Oct 2018 06:01), Max: 99.1 (21 Oct 2018 21:05)  HR: 80 (22 Oct 2018 06:01) (77 - 89)  BP: 127/83 (22 Oct 2018 06:01) (127/83 - 151/71)  BP(mean): --  RR: 18 (22 Oct 2018 06:01) (18 - 18)  SpO2: 98% (22 Oct 2018 06:01) (97% - 99%)      PHYSICAL EXAM  General: NAD  HEENT: PERRLA, EOMI, clear oropharynx, anicteric sclera, pink conjunctiva  Neck: supple  CV: (+) S1/S2 RRR  Lungs: clear to auscultation, no wheezes or rales  Abdomen: soft, non-tender, non-distended (+) BS  Ext: no clubbing, cyanosis or edema  Skin: no rashes. Generalized ecchymosis  Neuro: alert and oriented X 3, no focal deficits  Central Line: C/D/I      LABS:                                 8.3    0.8   )-----------( 3        ( 22 Oct 2018 06:54 )             24.0         Mean Cell Volume : 88.4 fl  Mean Cell Hemoglobin : 30.6 pg  Mean Cell Hemoglobin Concentration : 34.6 gm/dL  Auto Neutrophil # : 0.0 K/uL  Auto Lymphocyte # : 0.8 K/uL  Auto Monocyte # : 0.0 K/uL  Auto Eosinophil # : 0.0 K/uL  Auto Basophil # : 0.0 K/uL  Auto Neutrophil % : 1.3 %  Auto Lymphocyte % : 97.7 %  Auto Monocyte % : 0.6 %  Auto Eosinophil % : 0.4 %  Auto Basophil % : 0.0 %      10-22    138  |  103  |  10  ----------------------------<  101<H>  4.2   |  26  |  0.57    Ca    9.8      22 Oct 2018 06:54  Phos  2.1     10-22  Mg     2.3     10-22    TPro  6.1  /  Alb  3.4  /  TBili  0.6  /  DBili  x   /  AST  12  /  ALT  22  /  AlkPhos  157<H>  10-22      Mg 2.3  Phos 2.1      PT/INR - ( 22 Oct 2018 06:54 )   PT: 10.2 sec;   INR: 0.95 ratio         PTT - ( 22 Oct 2018 06:54 )  PTT:28.6 sec      Uric Acid 1.8        RECENT CULTURES:    Culture - Blood (10.16.18 @ 13:21)    Specimen Source: .Blood Blood-Peripheral    Culture Results:   No growth to date.    Culture - Blood (10.16.18 @ 13:20)    Specimen Source: .Blood PICC/PERC Double Lumen BLUE    Culture Results:   No growth to date.    Culture - Urine (10.13.18 @ 03:01)    Specimen Source: .Urine Clean Catch (Midstream)    Culture Results:   No growth      RADIOLOGY & ADDITIONAL STUDIES:    EXAM:  CT ABDOMEN AND PELVIS OC IC/ CT CHEST IC                        PROCEDURE DATE:  10/16/2018    IMPRESSION: Scattered and patchy groundglass opacities in the right upper and lower   lobes, possibly infection.   Heterogeneous thickening of the endometrial cavity, measuring 1.3 cm, for   which further evaluation with pelvic ultrasound is recommended.      EXAM:  CT BRAIN                        PROCEDURE DATE:  10/16/2018    IMPRESSION:  Decreased conspicuity to posterior interhemispheric and left   tentorial subdural hematoma compared with the prior 10/11/2018. Right   frontal ventricular catheter with its tip in the region of the right   foramen of Stephan.

## 2018-10-22 NOTE — PROGRESS NOTE ADULT - ATTENDING COMMENTS
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 FISH and PCR negative. s/p Masha-Auto on 12/16/16. Pt was on sprycel maintenance. 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  Now getting Inotuzumab C1 day 14.    -Afebrile today. BCx/UCx negative 10/13, 10/16.  CT c/a/p 10/16- Scattered and patchy groundglass opacities in the right upper and lower lobes, possibly infection.  Heterogeneous thickening of the endometrial cavity.  Continue vanco, meropenem, mycamine.    - goal plt ct >80K, but plt are refractory- transfuse 1/2 units over 3 hours twice a day. Keep hb >7.   - cont keppra px.  Repeat head CT stable 10/11, decreased conspicuity of hematomas on 10/16.  No ommaya tap until her plt >50.   - GI bleed has resolved, continue po protonix.  - OOB, ambulation. 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 FISH and PCR negative. s/p Masha-Auto on 12/16/16. Pt was on sprycel maintenance. 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  Now getting Inotuzumab C1 day 15.    -Afebrile today. BCx/UCx negative 10/13, 10/16.  CT c/a/p 10/16- Scattered and patchy groundglass opacities in the right upper and lower lobes, possibly infection.  Heterogeneous thickening of the endometrial cavity.  Continue vanco, meropenem, mycamine.    - goal plt ct >80K, but plt are refractory- transfuse 1/2 units over 3 hours twice a day. Keep hb >7.   - cont keppra px.  Repeat head CT stable 10/11, decreased conspicuity of hematomas on 10/16.  No ommaya tap until her plt >50.   - GI bleed has resolved, continue po protonix.  - OOB, ambulation.

## 2018-10-22 NOTE — ADVANCED PRACTICE NURSE CONSULT - ASSESSMENT
Pt. seen in bed a/ox4,denies any discomfort at this time.Chemotherapy teachings done.Pt. verbalized understanding of it.Lab values reviewed on rounds by Dr. Balderas.Pt. has right double lumen PICC.Site with no s/s of bleeding or swelling.Denies any pain,with positive blood return noted and flushing easily with 10 ml NS.Drug verification done by 2 RN.'s.Pt. premedicated with tylenol 650mg po,benadryl 50 mg IV and hydrocortisone 100 mg IV.Besponsa 1.08 mg in NS 50 ml infused over one hour.Pt. tolerated well.Primary RN aware of present treatment.Left pt. comfortable in bed.

## 2018-10-22 NOTE — PROGRESS NOTE ADULT - PROBLEM SELECTOR PLAN 2
Pt is neutropenic, afebrile   If febrile Pan Cx and CXR  Continue Vancomycin and Mycamine for ppx  10/16 CT C/A/P with Scattered and patchy ground-glass opacities in the right upper and lower lobes, possibly infection. Broadened Cefepime to Meropenem on 10/17 Pt is neutropenic, afebrile   If febrile Pan Cx and CXR. Fever has been associated with Inotuzamab  Continue Mycamine for ppx (No azoles with Inotuzamab)  Patient has been afebrile, will stop IV Vancomycin today, if clinically decompensates can consider restarting  10/16 CT C/A/P with Scattered and patchy ground-glass opacities in the right upper and lower lobes, possibly infection. ID dose not feel this is fungal. Abx broadened to Meropenem on 10/17  ID following

## 2018-10-22 NOTE — CONSULT NOTE ADULT - SUBJECTIVE AND OBJECTIVE BOX
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 FISH and PCR negative. s/p Masha-Auto on 16. Patient presented to the ER for headaches which started on Friday.  Headache was sudden onset, 10/10 intensity throbbing. Located on the occipital and left paritial region. No exacerbating or relieving factors. She then developed nausea w/o vomiting and felt clammy and diaphoretic. She also endorsed SOB when walking from bed room to living room. She denies chest pain or palpations. Around Midweek, patient noticed easy bruising and dark lesions in her mouth, Also patient states that her stool has been black in color starting earlier this week. Her BMs are regular, w/o diarrhea or BRBpR. Denies abdominal pain. States she's tried Tylenol and Ibuprofen for headaches.   Approx. 3 weeks ago, patient had severe 10/10 sharp/burning right abdomen pain, vesicular lesions along her right side, she was diagnosed w/ shingles and given 10 day course of antiviral. the lesions crusted over and now left w/ hyperpigmentation and pain.   In the ER: patient underwent CT head, Neurosurgery consult, given platelets and DDAVP. (05 Oct 2018 00:27)  Above reviewed:  Pt had been on Sprycel maintenance admitted headache, found to have subdural hematoma, found to have relapsed disease. She is being treated with Inatuzumab day 1, 8, 15. The patients hospital course has been complicated by SDH, subconjunctival hemorrhage, neutropenic fevers, transaminitis and upper GIB. The patient has pancytopenia 2/2 disease and/or chemotherapy      PAST MEDICAL & SURGICAL HISTORY:  Herpes zoster  Leukemia  Clostridium difficile diarrhea  Intractable migraine with status migrainosus, unspecified migraine type  Sciatica of right side  Chronic gastritis, presence of bleeding unspecified, unspecified gastritis type  Essential hypertension  Lipoma: left side  Elective surgical procedure: ommaya placement  History of  delivery      REVIEW OF SYSTEMS    General:	Denies any chills. Fevers absent    Skin: No rash  	  Ophthalmologic: Denies any visual complaints, discharge, redness.  	  ENMT: No nasal congestion or throat pain.     Respiratory and Thorax: No cough, sputum or chest pain. Denies shortness of breath.  	  Cardiovascular: No chest pain, palpitations.    Gastrointestinal: No nausea, abdominal pain or diarrhea.    Genitourinary: No dysuria, frequency. No flank pain.    Musculoskeletal: No joint swelling or pain.    Neurological: No confusion. No extremity weakness.    Psychiatric: No hallucinations	    Hematology/Lymphatics: No LN swelling.    Endocrine: No recent weight gain or loss. No abnormal heat/cold intolerance    Allergic/Immunologic:	No hives or rash     Social history:    FAMILY HISTORY:  No pertinent family history in first degree relatives      Allergies    No Known Drug Allergies  shellfish (Nausea; Vomiting)    Intolerances        Antimicrobials:    meropenem  IVPB      meropenem  IVPB 2000 milliGRAM(s) IV Intermittent every 8 hours  micafungin IVPB 100 milliGRAM(s) IV Intermittent daily        Vital Signs Last 24 Hrs  T(C): 36.9 (22 Oct 2018 14:00), Max: 37.3 (21 Oct 2018 21:05)  T(F): 98.4 (22 Oct 2018 14:00), Max: 99.1 (21 Oct 2018 21:05)  HR: 85 (22 Oct 2018 14:00) (80 - 94)  BP: 125/76 (22 Oct 2018 14:00) (122/88 - 144/82)  BP(mean): --  RR: 18 (22 Oct 2018 14:00) (18 - 18)  SpO2: 99% (22 Oct 2018 14:00) (97% - 100%)    PHYSICAL EXAM: Patient in no acute distress.    Constitutional: Comfortable. Awake and alert    Eyes: No discharge or conjunctival injection    ENMT: No thrush. No pharyngeal exudate or erythema.    Neck: Supple, No LN.    Respiratory: Good air entry bilaterally.    Cardiovascular: S1 S2 wnl, No murmurs.    Gastrointestinal: Soft BS(+) no tenderness, non distended.    Genitourinary: No CVA tenderness     Extremities: No edema.    Vascular: peripheral pulses felt    Neurological: AAO X 3. No grossly focal deficits.    Skin: No rash     Musculoskeletal: No joint swelling.    Psychiatric: Affect normal.                              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 FISH and PCR negative. s/p Masha-Auto on 16. Patient presented to the ER for headaches which started on Friday.  Headache was sudden onset, 10/10 intensity throbbing. Located on the occipital and left paritial region. No exacerbating or relieving factors. She then developed nausea w/o vomiting and felt clammy and diaphoretic. She also endorsed SOB when walking from bed room to living room. She denies chest pain or palpations. Around Midweek, patient noticed easy bruising and dark lesions in her mouth, Also patient states that her stool has been black in color starting earlier this week. Her BMs are regular, w/o diarrhea or BRBpR. Denies abdominal pain. States she's tried Tylenol and Ibuprofen for headaches.   Approx. 3 weeks ago, patient had severe 10/10 sharp/burning right abdomen pain, vesicular lesions along her right side, she was diagnosed w/ shingles and given 10 day course of antiviral. the lesions crusted over and now left w/ hyperpigmentation and pain.   In the ER: patient underwent CT head, Neurosurgery consult, given platelets and DDAVP. (05 Oct 2018 00:27)  Above reviewed:  Pt had been on Sprycel maintenance admitted headache, found to have subdural hematoma, found to have relapsed disease. She is being treated with Inatuzumab day 1, 8, 15. The patients hospital course has been complicated by SDH, subconjunctival hemorrhage, neutropenic fevers, transaminitis and upper GIB. The patient has pancytopenia 2/2 disease and/or chemotherapy. ID consulted for Neutropenic fever.       PAST MEDICAL & SURGICAL HISTORY:  Herpes zoster  Leukemia  Clostridium difficile diarrhea  Intractable migraine with status migrainosus, unspecified migraine type  Sciatica of right side  Chronic gastritis, presence of bleeding unspecified, unspecified gastritis type  Essential hypertension  Lipoma: left side  Elective surgical procedure: ommaya placement  History of  delivery      REVIEW OF SYSTEMS    General: Denies any chills. Fevers resolved.     Skin: Denies rash.   	  Ophthalmologic: Denies any visual complaints, discharge, redness.  	  ENT: No nasal congestion or throat pain.     Respiratory and Thorax: Occasional intermittent cough, no sputum. Denies shortness of breath.  	  Cardiovascular: No chest pain, palpitations.    Gastrointestinal: Resolved nausea, abdominal pain or diarrhea.    Genitourinary: No dysuria, frequency. No flank pain.    Musculoskeletal: No joint swelling or pain.    Neurological: No extremity weakness. Headache lt sided, improved in comparison.     Psychiatric: No hallucinations	    Endocrine: No abnormal heat/cold intolerance    Allergic/Immunologic:	No hives or rash       Social history:  , no smoking, lives with children.       FAMILY HISTORY:  No history of leukemia in the family.       Allergies  No Known Drug Allergies  shellfish (Nausea; Vomiting)        Antimicrobials:   meropenem  IVPB 2000 milliGRAM(s) IV Intermittent every 8 hours  micafungin IVPB 100 milliGRAM(s) IV Intermittent daily        Vital Signs Last 24 Hrs  T(C): 36.9 (22 Oct 2018 14:00), Max: 37.3 (21 Oct 2018 21:05)  T(F): 98.4 (22 Oct 2018 14:00), Max: 99.1 (21 Oct 2018 21:05)  HR: 85 (22 Oct 2018 14:00) (80 - 94)  BP: 125/76 (22 Oct 2018 14:00) (122/88 - 144/82)  RR: 18 (22 Oct 2018 14:00) (18 - 18)  SpO2: 99% (22 Oct 2018 14:00) (97% - 100%)      PHYSICAL EXAM: Patient in no acute distress.    Constitutional: Comfortable. Awake and alert    Eyes: No discharge or conjunctival injection    ENT: No thrush. No pharyngeal exudate or erythema. Rt sided omaya.      Neck: Supple.    Respiratory: Good air entry bilaterally.    Cardiovascular: S1 S2 wnl, No murmurs.    Gastrointestinal: Soft BS(+) no tenderness, non distended. Well healed midline surgical incision.     Genitourinary: No CVA tenderness     Extremities: No edema. Rt arm PICC     Vascular: peripheral pulses felt    Neurological: AAO X 3. No grossly focal deficits.    Skin: Petechial rash upper chest and some scattered in the abdomen.     Musculoskeletal: No joint swelling.     Psychiatric: Affect normal.                            8.3    0.8   )-----------( 3        ( 22 Oct 2018 06:54 )             24.0       10-22    138  |  103  |  10  ----------------------------<  101<H>  4.2   |  26  |  0.57    Ca    9.8      22 Oct 2018 06:54  Phos  2.1     10-22  Mg     2.3     10-22    TPro  6.1  /  Alb  3.4  /  TBili  0.6  /  DBili  x   /  AST  12  /  ALT  22  /  AlkPhos  157<H>  10-22    Urinalysis (10.16.18 @ 09:36)    Nitrite: Negative    Leukocyte Esterase Concentration: Negative    Red Blood Cell - Urine: 18 /HPF    White Blood Cell - Urine: 1 /HPF    Bacteria: Negative    Epithelial Cells: 2 /HPF      Culture - Blood (10.16.18 @ 13:21)    Specimen Source: .Blood Blood-Peripheral    Culture Results:   No growth at 5 days.    Culture - Urine (10.16.18 @ 13:01)    Specimen Source: .Urine Clean Catch (Midstream)    Culture Results:   No growth        Radiology: Imaging reviewed personally [ x]  < from: CT Chest w/ IV Cont (10.16.18 @ 17:44) >  IMPRESSION:   Scattered and patchy groundglass opacities in the right upper and lower   lobes, possibly infection.     Heterogeneous thickening of the endometrial cavity, measuring 1.3 cm, for   which further evaluation with pelvic ultrasound is recommended.      < from: CT Head No Cont (10.16.18 @ 17:29) >  IMPRESSION:  Decreased conspicuity to posterior interhemispheric and left   tentorial subdural hematoma compared with the prior 10/11/2018. Right   frontal ventricular catheter with its tip in the region of the right   foramen of Stephan.

## 2018-10-22 NOTE — PROGRESS NOTE ADULT - PROBLEM SELECTOR PLAN 1
10/5 Peripheral flow confirms relapse with BCR/ABL rearrangement in 79.5% of cells  Continue Inotuzamab (Day 1, 8 and 15)  For BM bx after second cycle  Monitor CBC/Lytes and transfuse/replete PRN  Thrombocytopenia with SDH: Patient platelet refractory, per blood bank patient to receive 1/2 bags over 3 hours q 12 hours. HLA is not available today   Hypophosphatemia: K phos 15 mmol IV x 1    Strict Is and Os/Daily weights/Mouth Care  Antiemetics  IVF 10/5 Peripheral flow confirms relapse with BCR/ABL rearrangement in 79.5% of cells  Continue Inotuzamab (Day 1, 8 and 15)  For BM bx after second cycle  Monitor CBC/Lytes and transfuse/replete PRN  Thrombocytopenia with SDH: Patient platelet refractory, per blood bank patient to receive 1/2 bags over 3 hours q 12 hours. HLA is not available today   Hypophosphatemia: K phos 15 mmol IV x 1    Strict Is and Os/Daily weights/Mouth Care  Antiemetics- tolerating diet after Emend given 10/21  IVF

## 2018-10-23 LAB
ALBUMIN SERPL ELPH-MCNC: 3.5 G/DL — SIGNIFICANT CHANGE UP (ref 3.3–5)
ALP SERPL-CCNC: 150 U/L — HIGH (ref 40–120)
ALT FLD-CCNC: 24 U/L — SIGNIFICANT CHANGE UP (ref 10–45)
ANION GAP SERPL CALC-SCNC: 8 MMOL/L — SIGNIFICANT CHANGE UP (ref 5–17)
AST SERPL-CCNC: 12 U/L — SIGNIFICANT CHANGE UP (ref 10–40)
BASOPHILS # BLD AUTO: 0 K/UL — SIGNIFICANT CHANGE UP (ref 0–0.2)
BASOPHILS NFR BLD AUTO: 0 % — SIGNIFICANT CHANGE UP (ref 0–2)
BILIRUB SERPL-MCNC: 0.5 MG/DL — SIGNIFICANT CHANGE UP (ref 0.2–1.2)
BUN SERPL-MCNC: 11 MG/DL — SIGNIFICANT CHANGE UP (ref 7–23)
CALCIUM SERPL-MCNC: 9.2 MG/DL — SIGNIFICANT CHANGE UP (ref 8.4–10.5)
CHLORIDE SERPL-SCNC: 106 MMOL/L — SIGNIFICANT CHANGE UP (ref 96–108)
CO2 SERPL-SCNC: 24 MMOL/L — SIGNIFICANT CHANGE UP (ref 22–31)
CREAT SERPL-MCNC: 0.52 MG/DL — SIGNIFICANT CHANGE UP (ref 0.5–1.3)
EOSINOPHIL # BLD AUTO: 0 K/UL — SIGNIFICANT CHANGE UP (ref 0–0.5)
EOSINOPHIL NFR BLD AUTO: 2.2 % — SIGNIFICANT CHANGE UP (ref 0–6)
GALACTOMANNAN AG SERPL-ACNC: <0.5 INDEX — SIGNIFICANT CHANGE UP
GLUCOSE SERPL-MCNC: 83 MG/DL — SIGNIFICANT CHANGE UP (ref 70–99)
HCT VFR BLD CALC: 21.4 % — LOW (ref 34.5–45)
HGB BLD-MCNC: 7.6 G/DL — LOW (ref 11.5–15.5)
LDH SERPL L TO P-CCNC: 243 U/L — HIGH (ref 50–242)
LYMPHOCYTES # BLD AUTO: 0.7 K/UL — LOW (ref 1–3.3)
LYMPHOCYTES # BLD AUTO: 91.4 % — HIGH (ref 13–44)
MAGNESIUM SERPL-MCNC: 2.4 MG/DL — SIGNIFICANT CHANGE UP (ref 1.6–2.6)
MCHC RBC-ENTMCNC: 31.3 PG — SIGNIFICANT CHANGE UP (ref 27–34)
MCHC RBC-ENTMCNC: 35.6 GM/DL — SIGNIFICANT CHANGE UP (ref 32–36)
MCV RBC AUTO: 88.1 FL — SIGNIFICANT CHANGE UP (ref 80–100)
MONOCYTES # BLD AUTO: 0 K/UL — SIGNIFICANT CHANGE UP (ref 0–0.9)
MONOCYTES NFR BLD AUTO: 2.2 % — SIGNIFICANT CHANGE UP (ref 2–14)
NEUTROPHILS # BLD AUTO: 0 K/UL — LOW (ref 1.8–7.4)
NEUTROPHILS NFR BLD AUTO: 4.1 % — LOW (ref 43–77)
PHOSPHATE SERPL-MCNC: 1.5 MG/DL — LOW (ref 2.5–4.5)
PHOSPHATE SERPL-MCNC: 2.2 MG/DL — LOW (ref 2.5–4.5)
PLATELET # BLD AUTO: 7 K/UL — CRITICAL LOW (ref 150–400)
PLATELET # BLD AUTO: 8 K/UL — CRITICAL LOW (ref 150–400)
POTASSIUM SERPL-MCNC: 4.1 MMOL/L — SIGNIFICANT CHANGE UP (ref 3.5–5.3)
POTASSIUM SERPL-SCNC: 4.1 MMOL/L — SIGNIFICANT CHANGE UP (ref 3.5–5.3)
PROT SERPL-MCNC: 6.1 G/DL — SIGNIFICANT CHANGE UP (ref 6–8.3)
RBC # BLD: 2.43 M/UL — LOW (ref 3.8–5.2)
RBC # FLD: 15 % — HIGH (ref 10.3–14.5)
SODIUM SERPL-SCNC: 138 MMOL/L — SIGNIFICANT CHANGE UP (ref 135–145)
URATE SERPL-MCNC: 1.6 MG/DL — LOW (ref 2.5–7)
WBC # BLD: 0.8 K/UL — CRITICAL LOW (ref 3.8–10.5)
WBC # FLD AUTO: 0.8 K/UL — CRITICAL LOW (ref 3.8–10.5)

## 2018-10-23 PROCEDURE — 70450 CT HEAD/BRAIN W/O DYE: CPT | Mod: 26

## 2018-10-23 PROCEDURE — 99232 SBSQ HOSP IP/OBS MODERATE 35: CPT

## 2018-10-23 PROCEDURE — 70450 CT HEAD/BRAIN W/O DYE: CPT | Mod: 26,77

## 2018-10-23 RX ORDER — POTASSIUM PHOSPHATE, MONOBASIC POTASSIUM PHOSPHATE, DIBASIC 236; 224 MG/ML; MG/ML
15 INJECTION, SOLUTION INTRAVENOUS ONCE
Qty: 0 | Refills: 0 | Status: COMPLETED | OUTPATIENT
Start: 2018-10-23 | End: 2018-10-23

## 2018-10-23 RX ADMIN — HYDROMORPHONE HYDROCHLORIDE 1 MILLIGRAM(S): 2 INJECTION INTRAMUSCULAR; INTRAVENOUS; SUBCUTANEOUS at 00:18

## 2018-10-23 RX ADMIN — HYDROMORPHONE HYDROCHLORIDE 1 MILLIGRAM(S): 2 INJECTION INTRAMUSCULAR; INTRAVENOUS; SUBCUTANEOUS at 06:10

## 2018-10-23 RX ADMIN — MEROPENEM 200 MILLIGRAM(S): 1 INJECTION INTRAVENOUS at 21:05

## 2018-10-23 RX ADMIN — HYDROMORPHONE HYDROCHLORIDE 1 MILLIGRAM(S): 2 INJECTION INTRAMUSCULAR; INTRAVENOUS; SUBCUTANEOUS at 12:56

## 2018-10-23 RX ADMIN — HYDROMORPHONE HYDROCHLORIDE 1 MILLIGRAM(S): 2 INJECTION INTRAMUSCULAR; INTRAVENOUS; SUBCUTANEOUS at 21:05

## 2018-10-23 RX ADMIN — HYDROMORPHONE HYDROCHLORIDE 0.5 MILLIGRAM(S): 2 INJECTION INTRAMUSCULAR; INTRAVENOUS; SUBCUTANEOUS at 09:55

## 2018-10-23 RX ADMIN — MICAFUNGIN SODIUM 105 MILLIGRAM(S): 100 INJECTION, POWDER, LYOPHILIZED, FOR SOLUTION INTRAVENOUS at 11:25

## 2018-10-23 RX ADMIN — SODIUM CHLORIDE 50 MILLILITER(S): 9 INJECTION INTRAMUSCULAR; INTRAVENOUS; SUBCUTANEOUS at 18:00

## 2018-10-23 RX ADMIN — MEROPENEM 200 MILLIGRAM(S): 1 INJECTION INTRAVENOUS at 05:54

## 2018-10-23 RX ADMIN — LEVETIRACETAM 500 MILLIGRAM(S): 250 TABLET, FILM COATED ORAL at 17:34

## 2018-10-23 RX ADMIN — POTASSIUM PHOSPHATE, MONOBASIC POTASSIUM PHOSPHATE, DIBASIC 62.5 MILLIMOLE(S): 236; 224 INJECTION, SOLUTION INTRAVENOUS at 14:12

## 2018-10-23 RX ADMIN — POTASSIUM PHOSPHATE, MONOBASIC POTASSIUM PHOSPHATE, DIBASIC 62.5 MILLIMOLE(S): 236; 224 INJECTION, SOLUTION INTRAVENOUS at 22:34

## 2018-10-23 RX ADMIN — HYDROMORPHONE HYDROCHLORIDE 1 MILLIGRAM(S): 2 INJECTION INTRAMUSCULAR; INTRAVENOUS; SUBCUTANEOUS at 21:35

## 2018-10-23 RX ADMIN — HYDROMORPHONE HYDROCHLORIDE 1 MILLIGRAM(S): 2 INJECTION INTRAMUSCULAR; INTRAVENOUS; SUBCUTANEOUS at 13:15

## 2018-10-23 RX ADMIN — PANTOPRAZOLE SODIUM 40 MILLIGRAM(S): 20 TABLET, DELAYED RELEASE ORAL at 05:53

## 2018-10-23 RX ADMIN — HYDROMORPHONE HYDROCHLORIDE 0.5 MILLIGRAM(S): 2 INJECTION INTRAMUSCULAR; INTRAVENOUS; SUBCUTANEOUS at 08:37

## 2018-10-23 RX ADMIN — LEVETIRACETAM 500 MILLIGRAM(S): 250 TABLET, FILM COATED ORAL at 05:53

## 2018-10-23 RX ADMIN — Medication 1 TABLET(S): at 11:25

## 2018-10-23 RX ADMIN — HYDROMORPHONE HYDROCHLORIDE 1 MILLIGRAM(S): 2 INJECTION INTRAMUSCULAR; INTRAVENOUS; SUBCUTANEOUS at 05:53

## 2018-10-23 RX ADMIN — MEROPENEM 200 MILLIGRAM(S): 1 INJECTION INTRAVENOUS at 14:12

## 2018-10-23 RX ADMIN — HYDROMORPHONE HYDROCHLORIDE 1 MILLIGRAM(S): 2 INJECTION INTRAMUSCULAR; INTRAVENOUS; SUBCUTANEOUS at 17:38

## 2018-10-23 RX ADMIN — HYDROMORPHONE HYDROCHLORIDE 1 MILLIGRAM(S): 2 INJECTION INTRAMUSCULAR; INTRAVENOUS; SUBCUTANEOUS at 16:59

## 2018-10-23 RX ADMIN — HYDROMORPHONE HYDROCHLORIDE 1 MILLIGRAM(S): 2 INJECTION INTRAMUSCULAR; INTRAVENOUS; SUBCUTANEOUS at 00:35

## 2018-10-23 NOTE — PROGRESS NOTE ADULT - PROBLEM SELECTOR PLAN 1
10/5 Peripheral flow confirms relapse with BCR/ABL rearrangement in 79.5% of cells  Continue Inotuzamab (Day 1, 8 and 15)  For BM bx after second cycle  Monitor CBC/Lytes and transfuse/replete PRN  Thrombocytopenia with SDH: Patient platelet refractory, per blood bank patient to receive 1/2 bags over 3 hours q 12 hours. HLA is not available today   Hypophosphatemia: K phos 15 mmol IV x 1    Strict Is and Os/Daily weights/Mouth Care  Antiemetics  IVF 10/5 Peripheral flow confirms relapse with BCR/ABL rearrangement in 79.5% of cells  Continue Inotuzamab (Day 1, 8 and 15)  For BM bx after second cycle  Monitor CBC/Lytes and transfuse/replete PRN  Thrombocytopenia with SDH: Patient platelet refractory, per blood bank patient to receive 1/2 bags over 3 hours q 12 hours. HLA is not available today   Hypophosphatemia: K phos 15 mmol IV x 1 with repeat level after  Strict Is and Os/Daily weights/Mouth Care  Antiemetics  IVF 10/5 Peripheral flow confirms relapse with BCR/ABL rearrangement in 79.5% of cells  Continue Inotuzamab (Day 1, 8 and 15)  For BM bx after second cycle  Monitor CBC/Lytes and transfuse/replete PRN  Thrombocytopenia with SDH: Patient platelet refractory, per blood bank patient to receive 1/2 bags over 3 hours q 12 hours. HLA available today, will give and repeat post platelet count after  Hypophosphatemia: K phos 15 mmol IV x 1 with repeat level after  Strict Is and Os/Daily weights/Mouth Care  Antiemetics  IVF

## 2018-10-23 NOTE — PROGRESS NOTE ADULT - PROBLEM SELECTOR PLAN 3
10/4 CT Head revealed small bilateral acute on chronic convexity subdural hematomas. Repeat CT Head on 10/5, 10/6 and 10/11 is stable with 10/16 results showing SDH decreasing in size  Continue Keppra 500mg BID for seizure ppx   Keep PLT >80K  Neuro checks q4 hrs  Pain management   Neurosurgery following, no intervention at this time. Continue to keep platelet goal above 80K and do not tap Omaya until platelets are at a minimum of 50K as SDH may increase 10/4 CT Head revealed small bilateral acute on chronic convexity subdural hematomas. Repeat CT Head on 10/5, 10/6 and 10/11 is stable with 10/16 results showing SDH decreasing in size  Continue Keppra 500mg BID for seizure ppx   Keep PLT >80K  Neuro checks q4 hrs  Pain management   Neurosurgery following, no intervention at this time. Continue to keep platelet goal above 80K and do not tap Omaya until platelets are at a minimum of 50K as SDH may increase  10/23 Follow up repeat CT Head 10/4 CT Head revealed small bilateral acute on chronic convexity subdural hematomas. Repeat CT Head on 10/5, 10/6 and 10/11 is stable with 10/16 results showing SDH decreasing in size. Repeat CT Head today with some small area of bleeding into previous collection. Discussed with Neuro Sx will repeat CT Head in 6 hours  Continue Keppra 500mg BID for seizure ppx   Keep PLT >80K  Neuro checks q4 hrs  Pain management   Neurosurgery following, no intervention at this time. Continue to keep platelet goal above 80K and do not tap Omaya until platelets are at a minimum of 50K as SDH may increase

## 2018-10-23 NOTE — PROGRESS NOTE ADULT - SUBJECTIVE AND OBJECTIVE BOX
Diagnosis: Relapsed ALL Ph (+)     Protocol/Chemo Regimen: Inotuzamab Day 1,8 and 15    Day: 16    Pt endorsed: Headache improved with pain medications, appetite improving everyday    Review of Systems: Patient denies dizziness, visual changes, chest pain, palpitations, SOB, abdominal pain, nausea, vomiting, diarrhea or dysuria.     Pain scale: 0    Diet: Regular    Allergies: No Known Drug Allergies  shellfish (Nausea; Vomiting)      ANTIMICROBIALS  meropenem  IVPB 1000 milliGRAM(s) IV Intermittent every 8 hours  micafungin IVPB 100 milliGRAM(s) IV Intermittent daily        STANDING MEDICATIONS  influenza   Vaccine 0.5 milliLiter(s) IntraMuscular once  levETIRAcetam 500 milliGRAM(s) Oral two times a day  multivitamin 1 Tablet(s) Oral daily  pantoprazole    Tablet 40 milliGRAM(s) Oral before breakfast  sodium chloride 0.9% lock flush 20 milliLiter(s) IV Push once  sodium chloride 0.9%. 1000 milliLiter(s) IV Continuous <Continuous>      PRN MEDICATIONS  acetaminophen   Tablet .. 650 milliGRAM(s) Oral every 6 hours PRN  acetaminophen   Tablet .. 650 milliGRAM(s) Oral every 6 hours PRN  HYDROmorphone  Injectable 0.5 milliGRAM(s) IV Push every 2 hours PRN  HYDROmorphone  Injectable 1 milliGRAM(s) IV Push every 4 hours PRN  metoclopramide Injectable 10 milliGRAM(s) IV Push every 6 hours PRN  sodium chloride 0.9% lock flush 10 milliLiter(s) IV Push every 1 hour PRN  sodium chloride 0.9% lock flush 10 milliLiter(s) IV Push every 12 hours PRN      Vital Signs Last 24 Hrs  T(C): 36.7 (23 Oct 2018 05:16), Max: 37.2 (22 Oct 2018 21:08)  T(F): 98.1 (23 Oct 2018 05:16), Max: 99 (22 Oct 2018 21:08)  HR: 80 (23 Oct 2018 05:16) (80 - 94)  BP: 132/76 (23 Oct 2018 05:16) (117/79 - 132/76)  BP(mean): --  RR: 18 (23 Oct 2018 05:16) (18 - 18)  SpO2: 98% (23 Oct 2018 05:16) (98% - 100%)      PHYSICAL EXAM  General: NAD  HEENT: PERRLA, EOMI, clear oropharynx, anicteric sclera, pink conjunctiva  Neck: supple  CV: (+) S1/S2 RRR  Lungs: clear to auscultation, no wheezes or rales  Abdomen: soft, non-tender, non-distended (+) BS  Ext: no clubbing, cyanosis or edema  Skin: no rashes. Generalized ecchymosis  Neuro: alert and oriented X 3, no focal deficits  Central Line: C/D/I        LABS:          RECENT CULTURES:    Culture - Blood (10.16.18 @ 13:21)    Specimen Source: .Blood Blood-Peripheral    Culture Results:   No growth to date.    Culture - Blood (10.16.18 @ 13:20)    Specimen Source: .Blood PICC/PERC Double Lumen BLUE    Culture Results:   No growth to date.    Culture - Urine (10.13.18 @ 03:01)    Specimen Source: .Urine Clean Catch (Midstream)    Culture Results:   No growth      RADIOLOGY & ADDITIONAL STUDIES:    EXAM:  CT ABDOMEN AND PELVIS OC IC/ CT CHEST IC                        PROCEDURE DATE:  10/16/2018    IMPRESSION: Scattered and patchy groundglass opacities in the right upper and lower   lobes, possibly infection.   Heterogeneous thickening of the endometrial cavity, measuring 1.3 cm, for   which further evaluation with pelvic ultrasound is recommended.      EXAM:  CT BRAIN                        PROCEDURE DATE:  10/16/2018    IMPRESSION:  Decreased conspicuity to posterior interhemispheric and left   tentorial subdural hematoma compared with the prior 10/11/2018. Right   frontal ventricular catheter with its tip in the region of the right   foramen of Stephan. Diagnosis: Relapsed ALL Ph (+)     Protocol/Chemo Regimen: Inotuzamab Day 1,8 and 15    Day: 16    Pt endorsed: Headache improved with pain medications, appetite improving everyday    Review of Systems: Patient denies dizziness, visual changes, chest pain, palpitations, SOB, abdominal pain, nausea, vomiting, diarrhea or dysuria.     Pain scale: 0    Diet: Regular    Allergies: No Known Drug Allergies  shellfish (Nausea; Vomiting)      ANTIMICROBIALS  meropenem  IVPB 1000 milliGRAM(s) IV Intermittent every 8 hours  micafungin IVPB 100 milliGRAM(s) IV Intermittent daily        STANDING MEDICATIONS  influenza   Vaccine 0.5 milliLiter(s) IntraMuscular once  levETIRAcetam 500 milliGRAM(s) Oral two times a day  multivitamin 1 Tablet(s) Oral daily  pantoprazole    Tablet 40 milliGRAM(s) Oral before breakfast  sodium chloride 0.9% lock flush 20 milliLiter(s) IV Push once  sodium chloride 0.9%. 1000 milliLiter(s) IV Continuous <Continuous>      PRN MEDICATIONS  acetaminophen   Tablet .. 650 milliGRAM(s) Oral every 6 hours PRN  acetaminophen   Tablet .. 650 milliGRAM(s) Oral every 6 hours PRN  HYDROmorphone  Injectable 0.5 milliGRAM(s) IV Push every 2 hours PRN  HYDROmorphone  Injectable 1 milliGRAM(s) IV Push every 4 hours PRN  metoclopramide Injectable 10 milliGRAM(s) IV Push every 6 hours PRN  sodium chloride 0.9% lock flush 10 milliLiter(s) IV Push every 1 hour PRN  sodium chloride 0.9% lock flush 10 milliLiter(s) IV Push every 12 hours PRN      Vital Signs Last 24 Hrs  T(C): 36.7 (23 Oct 2018 05:16), Max: 37.2 (22 Oct 2018 21:08)  T(F): 98.1 (23 Oct 2018 05:16), Max: 99 (22 Oct 2018 21:08)  HR: 80 (23 Oct 2018 05:16) (80 - 94)  BP: 132/76 (23 Oct 2018 05:16) (117/79 - 132/76)  BP(mean): --  RR: 18 (23 Oct 2018 05:16) (18 - 18)  SpO2: 98% (23 Oct 2018 05:16) (98% - 100%)      PHYSICAL EXAM  General: NAD  HEENT: PERRLA, EOMI, clear oropharynx, anicteric sclera, pink conjunctiva  Neck: supple  CV: (+) S1/S2 RRR  Lungs: clear to auscultation, no wheezes or rales  Abdomen: soft, non-tender, non-distended (+) BS  Ext: no clubbing, cyanosis or edema  Skin: no rashes. Generalized ecchymosis  Neuro: alert and oriented X 3, no focal deficits  Central Line: C/D/I        LABS:                        7.6    0.8   )-----------( 8        ( 23 Oct 2018 07:07 )             21.4         Mean Cell Volume : 88.1 fl  Mean Cell Hemoglobin : 31.3 pg  Mean Cell Hemoglobin Concentration : 35.6 gm/dL  Auto Neutrophil # : 0.0 K/uL  Auto Lymphocyte # : 0.7 K/uL  Auto Monocyte # : 0.0 K/uL  Auto Eosinophil # : 0.0 K/uL  Auto Basophil # : 0.0 K/uL  Auto Neutrophil % : 4.1 %  Auto Lymphocyte % : 91.4 %  Auto Monocyte % : 2.2 %  Auto Eosinophil % : 2.2 %  Auto Basophil % : 0.0 %      10-23    138  |  106  |  11  ----------------------------<  83  4.1   |  24  |  0.52    Ca    9.2      23 Oct 2018 07:07  Phos  1.5     10-23  Mg     2.4     10-23    TPro  6.1  /  Alb  3.5  /  TBili  0.5  /  DBili  x   /  AST  12  /  ALT  24  /  AlkPhos  150<H>  10-23      Mg 2.4  Phos 1.5      PT/INR - ( 22 Oct 2018 06:54 )   PT: 10.2 sec;   INR: 0.95 ratio         PTT - ( 22 Oct 2018 06:54 )  PTT:28.6 sec      Uric Acid 1.6        RECENT CULTURES:    Culture - Blood (10.16.18 @ 13:21)    Specimen Source: .Blood Blood-Peripheral    Culture Results:   No growth to date.    Culture - Blood (10.16.18 @ 13:20)    Specimen Source: .Blood PICC/PERC Double Lumen BLUE    Culture Results:   No growth to date.    Culture - Urine (10.13.18 @ 03:01)    Specimen Source: .Urine Clean Catch (Midstream)    Culture Results:   No growth      RADIOLOGY & ADDITIONAL STUDIES:    EXAM:  CT ABDOMEN AND PELVIS OC IC/ CT CHEST IC                        PROCEDURE DATE:  10/16/2018    IMPRESSION: Scattered and patchy groundglass opacities in the right upper and lower   lobes, possibly infection.   Heterogeneous thickening of the endometrial cavity, measuring 1.3 cm, for   which further evaluation with pelvic ultrasound is recommended.      EXAM:  CT BRAIN                        PROCEDURE DATE:  10/16/2018    IMPRESSION:  Decreased conspicuity to posterior interhemispheric and left   tentorial subdural hematoma compared with the prior 10/11/2018. Right   frontal ventricular catheter with its tip in the region of the right   foramen of Stephan. Diagnosis: Relapsed ALL Ph (+)     Protocol/Chemo Regimen: Inotuzamab Day 1,8 and 15    Day: 16    Pt endorsed: Headache improved with pain medications, appetite improving everyday    Review of Systems: Patient denies dizziness, visual changes, chest pain, palpitations, SOB, abdominal pain, nausea, vomiting, diarrhea or dysuria.     Pain scale: 0    Diet: Regular    Allergies: No Known Drug Allergies  shellfish (Nausea; Vomiting)      ANTIMICROBIALS  meropenem  IVPB 1000 milliGRAM(s) IV Intermittent every 8 hours  micafungin IVPB 100 milliGRAM(s) IV Intermittent daily        STANDING MEDICATIONS  influenza   Vaccine 0.5 milliLiter(s) IntraMuscular once  levETIRAcetam 500 milliGRAM(s) Oral two times a day  multivitamin 1 Tablet(s) Oral daily  pantoprazole    Tablet 40 milliGRAM(s) Oral before breakfast  sodium chloride 0.9% lock flush 20 milliLiter(s) IV Push once  sodium chloride 0.9%. 1000 milliLiter(s) IV Continuous <Continuous>      PRN MEDICATIONS  acetaminophen   Tablet .. 650 milliGRAM(s) Oral every 6 hours PRN  acetaminophen   Tablet .. 650 milliGRAM(s) Oral every 6 hours PRN  HYDROmorphone  Injectable 0.5 milliGRAM(s) IV Push every 2 hours PRN  HYDROmorphone  Injectable 1 milliGRAM(s) IV Push every 4 hours PRN  metoclopramide Injectable 10 milliGRAM(s) IV Push every 6 hours PRN  sodium chloride 0.9% lock flush 10 milliLiter(s) IV Push every 1 hour PRN  sodium chloride 0.9% lock flush 10 milliLiter(s) IV Push every 12 hours PRN      Vital Signs Last 24 Hrs  T(C): 36.7 (23 Oct 2018 05:16), Max: 37.2 (22 Oct 2018 21:08)  T(F): 98.1 (23 Oct 2018 05:16), Max: 99 (22 Oct 2018 21:08)  HR: 80 (23 Oct 2018 05:16) (80 - 94)  BP: 132/76 (23 Oct 2018 05:16) (117/79 - 132/76)  BP(mean): --  RR: 18 (23 Oct 2018 05:16) (18 - 18)  SpO2: 98% (23 Oct 2018 05:16) (98% - 100%)      PHYSICAL EXAM  General: NAD  HEENT: PERRLA, EOMI, clear oropharynx, anicteric sclera, pink conjunctiva  Neck: supple  CV: (+) S1/S2 RRR  Lungs: clear to auscultation, no wheezes or rales  Abdomen: soft, non-tender, non-distended (+) BS  Ext: no clubbing, cyanosis or edema  Skin: no rashes. Generalized ecchymosis  Neuro: alert and oriented X 3, no focal deficits  Central Line: C/D/I        LABS:                        7.6    0.8   )-----------( 8        ( 23 Oct 2018 07:07 )             21.4         Mean Cell Volume : 88.1 fl  Mean Cell Hemoglobin : 31.3 pg  Mean Cell Hemoglobin Concentration : 35.6 gm/dL  Auto Neutrophil # : 0.0 K/uL  Auto Lymphocyte # : 0.7 K/uL  Auto Monocyte # : 0.0 K/uL  Auto Eosinophil # : 0.0 K/uL  Auto Basophil # : 0.0 K/uL  Auto Neutrophil % : 4.1 %  Auto Lymphocyte % : 91.4 %  Auto Monocyte % : 2.2 %  Auto Eosinophil % : 2.2 %  Auto Basophil % : 0.0 %      10-23    138  |  106  |  11  ----------------------------<  83  4.1   |  24  |  0.52    Ca    9.2      23 Oct 2018 07:07  Phos  1.5     10-23  Mg     2.4     10-23    TPro  6.1  /  Alb  3.5  /  TBili  0.5  /  DBili  x   /  AST  12  /  ALT  24  /  AlkPhos  150<H>  10-23      Mg 2.4  Phos 1.5      PT/INR - ( 22 Oct 2018 06:54 )   PT: 10.2 sec;   INR: 0.95 ratio         PTT - ( 22 Oct 2018 06:54 )  PTT:28.6 sec      Uric Acid 1.6        RECENT CULTURES:    Culture - Blood (10.16.18 @ 13:21)    Specimen Source: .Blood Blood-Peripheral    Culture Results:   No growth to date.    Culture - Blood (10.16.18 @ 13:20)    Specimen Source: .Blood PICC/PERC Double Lumen BLUE    Culture Results:   No growth to date.    Culture - Urine (10.13.18 @ 03:01)    Specimen Source: .Urine Clean Catch (Midstream)    Culture Results:   No growth      RADIOLOGY & ADDITIONAL STUDIES:      EXAM:  CT BRAIN                        PROCEDURE DATE:  10/23/2018    Impression: Overall decrease in size of interhemispheric subdural mixed density   collection with slight increase in hyperdense component suggesting minor   rehemorrhage into the pre-existing collection.  Decreasing left frontoparietal low density subdural collection.        EXAM:  CT ABDOMEN AND PELVIS OC IC/ CT CHEST IC                        PROCEDURE DATE:  10/16/2018    IMPRESSION: Scattered and patchy groundglass opacities in the right upper and lower   lobes, possibly infection.   Heterogeneous thickening of the endometrial cavity, measuring 1.3 cm, for   which further evaluation with pelvic ultrasound is recommended. Diagnosis: Relapsed ALL Ph (+)     Protocol/Chemo Regimen: Inotuzamab Day 1,8 and 15    Day: 16    Pt endorsed: Headache improved with pain medications    Review of Systems: Patient denies dizziness, visual changes, chest pain, palpitations, SOB, abdominal pain, nausea, vomiting, diarrhea or dysuria.     Pain scale: 0    Diet: Regular    Allergies: No Known Drug Allergies  shellfish (Nausea; Vomiting)      ANTIMICROBIALS  meropenem  IVPB 1000 milliGRAM(s) IV Intermittent every 8 hours  micafungin IVPB 100 milliGRAM(s) IV Intermittent daily        STANDING MEDICATIONS  influenza   Vaccine 0.5 milliLiter(s) IntraMuscular once  levETIRAcetam 500 milliGRAM(s) Oral two times a day  multivitamin 1 Tablet(s) Oral daily  pantoprazole    Tablet 40 milliGRAM(s) Oral before breakfast  sodium chloride 0.9% lock flush 20 milliLiter(s) IV Push once  sodium chloride 0.9%. 1000 milliLiter(s) IV Continuous <Continuous>      PRN MEDICATIONS  acetaminophen   Tablet .. 650 milliGRAM(s) Oral every 6 hours PRN  acetaminophen   Tablet .. 650 milliGRAM(s) Oral every 6 hours PRN  HYDROmorphone  Injectable 0.5 milliGRAM(s) IV Push every 2 hours PRN  HYDROmorphone  Injectable 1 milliGRAM(s) IV Push every 4 hours PRN  metoclopramide Injectable 10 milliGRAM(s) IV Push every 6 hours PRN  sodium chloride 0.9% lock flush 10 milliLiter(s) IV Push every 1 hour PRN  sodium chloride 0.9% lock flush 10 milliLiter(s) IV Push every 12 hours PRN      Vital Signs Last 24 Hrs  T(C): 36.7 (23 Oct 2018 05:16), Max: 37.2 (22 Oct 2018 21:08)  T(F): 98.1 (23 Oct 2018 05:16), Max: 99 (22 Oct 2018 21:08)  HR: 80 (23 Oct 2018 05:16) (80 - 94)  BP: 132/76 (23 Oct 2018 05:16) (117/79 - 132/76)  BP(mean): --  RR: 18 (23 Oct 2018 05:16) (18 - 18)  SpO2: 98% (23 Oct 2018 05:16) (98% - 100%)      PHYSICAL EXAM  General: NAD  HEENT: PERRLA, EOMI, clear oropharynx, anicteric sclera, pink conjunctiva  Neck: supple  CV: (+) S1/S2 RRR  Lungs: clear to auscultation, no wheezes or rales  Abdomen: soft, non-tender, non-distended (+) BS  Ext: no clubbing, cyanosis or edema  Skin: no rashes. Generalized ecchymosis  Neuro: alert and oriented X 3, no focal deficits  Central Line: C/D/I        LABS:                        7.6    0.8   )-----------( 8        ( 23 Oct 2018 07:07 )             21.4         Mean Cell Volume : 88.1 fl  Mean Cell Hemoglobin : 31.3 pg  Mean Cell Hemoglobin Concentration : 35.6 gm/dL  Auto Neutrophil # : 0.0 K/uL  Auto Lymphocyte # : 0.7 K/uL  Auto Monocyte # : 0.0 K/uL  Auto Eosinophil # : 0.0 K/uL  Auto Basophil # : 0.0 K/uL  Auto Neutrophil % : 4.1 %  Auto Lymphocyte % : 91.4 %  Auto Monocyte % : 2.2 %  Auto Eosinophil % : 2.2 %  Auto Basophil % : 0.0 %      10-23    138  |  106  |  11  ----------------------------<  83  4.1   |  24  |  0.52    Ca    9.2      23 Oct 2018 07:07  Phos  1.5     10-23  Mg     2.4     10-23    TPro  6.1  /  Alb  3.5  /  TBili  0.5  /  DBili  x   /  AST  12  /  ALT  24  /  AlkPhos  150<H>  10-23      Mg 2.4  Phos 1.5      PT/INR - ( 22 Oct 2018 06:54 )   PT: 10.2 sec;   INR: 0.95 ratio         PTT - ( 22 Oct 2018 06:54 )  PTT:28.6 sec      Uric Acid 1.6        RECENT CULTURES:    Culture - Blood (10.16.18 @ 13:21)    Specimen Source: .Blood Blood-Peripheral    Culture Results:   No growth to date.    Culture - Blood (10.16.18 @ 13:20)    Specimen Source: .Blood PICC/PERC Double Lumen BLUE    Culture Results:   No growth to date.    Culture - Urine (10.13.18 @ 03:01)    Specimen Source: .Urine Clean Catch (Midstream)    Culture Results:   No growth      RADIOLOGY & ADDITIONAL STUDIES:      EXAM:  CT BRAIN                        PROCEDURE DATE:  10/23/2018    Impression: Overall decrease in size of interhemispheric subdural mixed density   collection with slight increase in hyperdense component suggesting minor   rehemorrhage into the pre-existing collection.  Decreasing left frontoparietal low density subdural collection.        EXAM:  CT ABDOMEN AND PELVIS OC IC/ CT CHEST IC                        PROCEDURE DATE:  10/16/2018    IMPRESSION: Scattered and patchy groundglass opacities in the right upper and lower   lobes, possibly infection.   Heterogeneous thickening of the endometrial cavity, measuring 1.3 cm, for   which further evaluation with pelvic ultrasound is recommended.

## 2018-10-23 NOTE — PROGRESS NOTE ADULT - ATTENDING COMMENTS
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 FISH and PCR negative. s/p Masha-Auto on 12/16/16. Pt was on sprycel maintenance. 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  Now getting Inotuzumab C1 day 15.    -Afebrile today. BCx/UCx negative 10/13, 10/16.  CT c/a/p 10/16- Scattered and patchy groundglass opacities in the right upper and lower lobes, possibly infection.  Heterogeneous thickening of the endometrial cavity.  Continue vanco, meropenem, mycamine.    - goal plt ct >80K, but plt are refractory- transfuse 1/2 units over 3 hours twice a day. Keep hb >7.   - cont keppra px.  Repeat head CT stable 10/11, decreased conspicuity of hematomas on 10/16.  No ommaya tap until her plt >50.   - GI bleed has resolved, continue po protonix.  - OOB, ambulation. 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 FISH and PCR negative. s/p Masha-Auto on 12/16/16. Pt was on sprycel maintenance. Admitted for subdural hematoma 10/4 in setting of severe thrombocytopenia 2/2 blast crisis and found to have ALL relapse. Has completed Inotuzumab. Headaches persist. Rates 8/10. No accompanying symptoms.  cont keppra px.  Repeat head CT shows small re-bleed. Discussed with Neurosurgery and will repeat CT later today.   - GI bleed has resolved, continue po protonix.  -Afebrile today. BCx/UCx negative 10/13, 10/16.  CT c/a/p 10/16- Scattered and patchy groundglass opacities in the right upper and lower lobes, possibly infection.  Heterogeneous thickening of the endometrial cavity.  Continue vanco, meropenem, mycamine.    - goal plt ct >80K, but plt are refractory- transfuse 1/2 units over 3 hours twice a day. Keep hb >7. Obtain HLA matched platelets as possible.  - OOB, ambulation.

## 2018-10-23 NOTE — PROGRESS NOTE ADULT - PROBLEM SELECTOR PLAN 2
Pt is neutropenic, afebrile   If febrile Pan Cx and CXR. Fever has been associated with Inotuzamab  Continue Mycamine for ppx (No azoles with Inotuzamab)  Patient has been afebrile, will stop IV Vancomycin today, if clinically decompensates can consider restarting  10/16 CT C/A/P with Scattered and patchy ground-glass opacities in the right upper and lower lobes, possibly infection. ID dose not feel this is fungal. Abx broadened to Meropenem on 10/17  ID following Pt is neutropenic, afebrile   If febrile Pan Cx and CXR  Continue Mycamine for ppx (No azoles with Inotuzamab)  10/16 CT C/A/P with Scattered and patchy ground-glass opacities in the right upper and lower lobes, possibly infection. ID dose not feel this is fungal. Abx broadened to Meropenem on 10/17  ID following

## 2018-10-24 LAB
ALBUMIN SERPL ELPH-MCNC: 3.7 G/DL — SIGNIFICANT CHANGE UP (ref 3.3–5)
ALP SERPL-CCNC: 156 U/L — HIGH (ref 40–120)
ALT FLD-CCNC: 23 U/L — SIGNIFICANT CHANGE UP (ref 10–45)
ANION GAP SERPL CALC-SCNC: 11 MMOL/L — SIGNIFICANT CHANGE UP (ref 5–17)
AST SERPL-CCNC: 14 U/L — SIGNIFICANT CHANGE UP (ref 10–40)
BASOPHILS # BLD AUTO: 0 K/UL — SIGNIFICANT CHANGE UP (ref 0–0.2)
BILIRUB SERPL-MCNC: 0.5 MG/DL — SIGNIFICANT CHANGE UP (ref 0.2–1.2)
BUN SERPL-MCNC: 10 MG/DL — SIGNIFICANT CHANGE UP (ref 7–23)
CALCIUM SERPL-MCNC: 9.1 MG/DL — SIGNIFICANT CHANGE UP (ref 8.4–10.5)
CHLORIDE SERPL-SCNC: 105 MMOL/L — SIGNIFICANT CHANGE UP (ref 96–108)
CO2 SERPL-SCNC: 24 MMOL/L — SIGNIFICANT CHANGE UP (ref 22–31)
CREAT SERPL-MCNC: 0.57 MG/DL — SIGNIFICANT CHANGE UP (ref 0.5–1.3)
EOSINOPHIL # BLD AUTO: 0 K/UL — SIGNIFICANT CHANGE UP (ref 0–0.5)
GLUCOSE SERPL-MCNC: 87 MG/DL — SIGNIFICANT CHANGE UP (ref 70–99)
HCT VFR BLD CALC: 21.9 % — LOW (ref 34.5–45)
HGB BLD-MCNC: 7.5 G/DL — LOW (ref 11.5–15.5)
LDH SERPL L TO P-CCNC: 233 U/L — SIGNIFICANT CHANGE UP (ref 50–242)
LYMPHOCYTES # BLD AUTO: 1 K/UL — SIGNIFICANT CHANGE UP (ref 1–3.3)
LYMPHOCYTES # BLD AUTO: 94 % — HIGH (ref 13–44)
MAGNESIUM SERPL-MCNC: 2.3 MG/DL — SIGNIFICANT CHANGE UP (ref 1.6–2.6)
MCHC RBC-ENTMCNC: 30.1 PG — SIGNIFICANT CHANGE UP (ref 27–34)
MCHC RBC-ENTMCNC: 34.1 GM/DL — SIGNIFICANT CHANGE UP (ref 32–36)
MCV RBC AUTO: 88.1 FL — SIGNIFICANT CHANGE UP (ref 80–100)
MONOCYTES # BLD AUTO: 0 K/UL — SIGNIFICANT CHANGE UP (ref 0–0.9)
MONOCYTES NFR BLD AUTO: 2 % — SIGNIFICANT CHANGE UP (ref 2–14)
NEUTROPHILS # BLD AUTO: 0 K/UL — LOW (ref 1.8–7.4)
NEUTROPHILS NFR BLD AUTO: 3 % — LOW (ref 43–77)
PHOSPHATE SERPL-MCNC: 2.2 MG/DL — LOW (ref 2.5–4.5)
PHOSPHATE SERPL-MCNC: 2.4 MG/DL — LOW (ref 2.5–4.5)
PLATELET # BLD AUTO: 20 K/UL — CRITICAL LOW (ref 150–400)
POTASSIUM SERPL-MCNC: 4.4 MMOL/L — SIGNIFICANT CHANGE UP (ref 3.5–5.3)
POTASSIUM SERPL-SCNC: 4.4 MMOL/L — SIGNIFICANT CHANGE UP (ref 3.5–5.3)
PROT SERPL-MCNC: 6.5 G/DL — SIGNIFICANT CHANGE UP (ref 6–8.3)
RBC # BLD: 2.49 M/UL — LOW (ref 3.8–5.2)
RBC # FLD: 15 % — HIGH (ref 10.3–14.5)
SODIUM SERPL-SCNC: 140 MMOL/L — SIGNIFICANT CHANGE UP (ref 135–145)
URATE SERPL-MCNC: 1.8 MG/DL — LOW (ref 2.5–7)
WBC # BLD: 1.1 K/UL — LOW (ref 3.8–10.5)
WBC # FLD AUTO: 1.1 K/UL — LOW (ref 3.8–10.5)

## 2018-10-24 PROCEDURE — 99232 SBSQ HOSP IP/OBS MODERATE 35: CPT

## 2018-10-24 PROCEDURE — 86078 PHYS BLOOD BANK SERV REACTJ: CPT

## 2018-10-24 RX ORDER — POTASSIUM PHOSPHATE, MONOBASIC POTASSIUM PHOSPHATE, DIBASIC 236; 224 MG/ML; MG/ML
15 INJECTION, SOLUTION INTRAVENOUS ONCE
Qty: 0 | Refills: 0 | Status: COMPLETED | OUTPATIENT
Start: 2018-10-24 | End: 2018-10-24

## 2018-10-24 RX ADMIN — HYDROMORPHONE HYDROCHLORIDE 1 MILLIGRAM(S): 2 INJECTION INTRAMUSCULAR; INTRAVENOUS; SUBCUTANEOUS at 04:58

## 2018-10-24 RX ADMIN — HYDROMORPHONE HYDROCHLORIDE 1 MILLIGRAM(S): 2 INJECTION INTRAMUSCULAR; INTRAVENOUS; SUBCUTANEOUS at 12:52

## 2018-10-24 RX ADMIN — MEROPENEM 200 MILLIGRAM(S): 1 INJECTION INTRAVENOUS at 14:28

## 2018-10-24 RX ADMIN — LEVETIRACETAM 500 MILLIGRAM(S): 250 TABLET, FILM COATED ORAL at 05:18

## 2018-10-24 RX ADMIN — HYDROMORPHONE HYDROCHLORIDE 1 MILLIGRAM(S): 2 INJECTION INTRAMUSCULAR; INTRAVENOUS; SUBCUTANEOUS at 22:25

## 2018-10-24 RX ADMIN — HYDROMORPHONE HYDROCHLORIDE 1 MILLIGRAM(S): 2 INJECTION INTRAMUSCULAR; INTRAVENOUS; SUBCUTANEOUS at 01:01

## 2018-10-24 RX ADMIN — MICAFUNGIN SODIUM 105 MILLIGRAM(S): 100 INJECTION, POWDER, LYOPHILIZED, FOR SOLUTION INTRAVENOUS at 14:24

## 2018-10-24 RX ADMIN — HYDROMORPHONE HYDROCHLORIDE 1 MILLIGRAM(S): 2 INJECTION INTRAMUSCULAR; INTRAVENOUS; SUBCUTANEOUS at 14:13

## 2018-10-24 RX ADMIN — SODIUM CHLORIDE 50 MILLILITER(S): 9 INJECTION INTRAMUSCULAR; INTRAVENOUS; SUBCUTANEOUS at 09:18

## 2018-10-24 RX ADMIN — HYDROMORPHONE HYDROCHLORIDE 1 MILLIGRAM(S): 2 INJECTION INTRAMUSCULAR; INTRAVENOUS; SUBCUTANEOUS at 01:31

## 2018-10-24 RX ADMIN — POTASSIUM PHOSPHATE, MONOBASIC POTASSIUM PHOSPHATE, DIBASIC 62.5 MILLIMOLE(S): 236; 224 INJECTION, SOLUTION INTRAVENOUS at 09:18

## 2018-10-24 RX ADMIN — HYDROMORPHONE HYDROCHLORIDE 1 MILLIGRAM(S): 2 INJECTION INTRAMUSCULAR; INTRAVENOUS; SUBCUTANEOUS at 09:52

## 2018-10-24 RX ADMIN — Medication 1 TABLET(S): at 11:28

## 2018-10-24 RX ADMIN — HYDROMORPHONE HYDROCHLORIDE 1 MILLIGRAM(S): 2 INJECTION INTRAMUSCULAR; INTRAVENOUS; SUBCUTANEOUS at 17:59

## 2018-10-24 RX ADMIN — HYDROMORPHONE HYDROCHLORIDE 1 MILLIGRAM(S): 2 INJECTION INTRAMUSCULAR; INTRAVENOUS; SUBCUTANEOUS at 21:55

## 2018-10-24 RX ADMIN — PANTOPRAZOLE SODIUM 40 MILLIGRAM(S): 20 TABLET, DELAYED RELEASE ORAL at 05:18

## 2018-10-24 RX ADMIN — SODIUM CHLORIDE 50 MILLILITER(S): 9 INJECTION INTRAMUSCULAR; INTRAVENOUS; SUBCUTANEOUS at 05:18

## 2018-10-24 RX ADMIN — MEROPENEM 200 MILLIGRAM(S): 1 INJECTION INTRAVENOUS at 05:18

## 2018-10-24 RX ADMIN — SODIUM CHLORIDE 50 MILLILITER(S): 9 INJECTION INTRAMUSCULAR; INTRAVENOUS; SUBCUTANEOUS at 17:03

## 2018-10-24 RX ADMIN — MEROPENEM 200 MILLIGRAM(S): 1 INJECTION INTRAVENOUS at 21:55

## 2018-10-24 RX ADMIN — HYDROMORPHONE HYDROCHLORIDE 1 MILLIGRAM(S): 2 INJECTION INTRAMUSCULAR; INTRAVENOUS; SUBCUTANEOUS at 17:50

## 2018-10-24 RX ADMIN — HYDROMORPHONE HYDROCHLORIDE 1 MILLIGRAM(S): 2 INJECTION INTRAMUSCULAR; INTRAVENOUS; SUBCUTANEOUS at 09:14

## 2018-10-24 RX ADMIN — LEVETIRACETAM 500 MILLIGRAM(S): 250 TABLET, FILM COATED ORAL at 17:02

## 2018-10-24 RX ADMIN — HYDROMORPHONE HYDROCHLORIDE 1 MILLIGRAM(S): 2 INJECTION INTRAMUSCULAR; INTRAVENOUS; SUBCUTANEOUS at 05:28

## 2018-10-24 RX ADMIN — POTASSIUM PHOSPHATE, MONOBASIC POTASSIUM PHOSPHATE, DIBASIC 62.5 MILLIMOLE(S): 236; 224 INJECTION, SOLUTION INTRAVENOUS at 17:02

## 2018-10-24 NOTE — PROGRESS NOTE ADULT - ATTENDING COMMENTS
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 FISH and PCR negative. s/p Masha-Auto on 12/16/16. Pt was on sprycel maintenance. Admitted for subdural hematoma 10/4 in setting of severe thrombocytopenia 2/2 blast crisis and found to have ALL relapse. Has completed Inotuzumab. Headaches persist. Rates 8/10. No accompanying symptoms.  cont keppra px.  Repeat head CT shows small re-bleed. Discussed with Neurosurgery and will repeat CT later today.   - GI bleed has resolved, continue po protonix.  -Afebrile today. BCx/UCx negative 10/13, 10/16.  CT c/a/p 10/16- Scattered and patchy groundglass opacities in the right upper and lower lobes, possibly infection.  Heterogeneous thickening of the endometrial cavity.  Continue vanco, meropenem, mycamine.    - goal plt ct >80K, but plt are refractory- transfuse 1/2 units over 3 hours twice a day. Keep hb >7. Obtain HLA matched platelets as possible.  - OOB, ambulation. 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 FISH and PCR negative. s/p Masha-Auto on 12/16/16. Pt was on sprycel maintenance. Admitted for subdural hematoma 10/4 in setting of severe thrombocytopenia 2/2 blast crisis and found to have ALL relapse. Has completed Inotuzumab. Headaches improved. No accompanying symptoms.  Cont keppra px.  Repeat head CT shows small re-bleed. Follow  up repeat CT stable.    - GI bleed has resolved, Continue po protonix.  -Afebrile today. BCx/UCx negative 10/13, 10/16.  CT c/a/p 10/16- Scattered and patchy groundglass opacities in the right upper and lower lobes, possibly infection.  Heterogeneous thickening of the endometrial cavity.  Continue vanco, meropenem, mycamine.    - goal plt ct >80K, but plt are refractory- transfuse 1/2 units over 3 hours twice a day. Keep hb >7. Obtain HLA matched platelets as possible.  - OOB, ambulation.

## 2018-10-24 NOTE — PROGRESS NOTE ADULT - SUBJECTIVE AND OBJECTIVE BOX
58y old  Female who presents with a chief complaint of Headache/ r/o relapse ALL (24 Oct 2018 07:37)      Interval history:  Afebrile, no cough, no SOB, no N/V/D, no abdominal pain, denies dysuria.     No Known Drug Allergies  shellfish (Nausea; Vomiting)    Antimicrobials:  meropenem  IVPB 1000 milliGRAM(s) IV Intermittent every 8 hours  micafungin IVPB 100 milliGRAM(s) IV Intermittent daily    REVIEW OF SYSTEMS:  No chest pain   No frequency      Vital Signs Last 24 Hrs  T(C): 37.7 (10-24-18 @ 18:14), Max: 37.7 (10-24-18 @ 18:14)  T(F): 99.8 (10-24-18 @ 18:14), Max: 99.8 (10-24-18 @ 18:14)  HR: 91 (10-24-18 @ 18:14) (81 - 98)  BP: 120/73 (10-24-18 @ 18:14) (120/73 - 156/97)  RR: 18 (10-24-18 @ 18:14) (18 - 20)  SpO2: 98% (10-24-18 @ 18:14) (95% - 99%)    PHYSICAL EXAM:  Patient in no acute distress. AAOX3.  No icterus, no oral ulcers.  Cardiovascular: S1S2 normal.  Lungs: Good air entry B/L lung fields.  Gastrointestinal: soft, nontender, nondistended.  Extremities: no edema.  rt arm picc  petechial rash upper chest and some legs and abdomen.                            7.5    1.1   )-----------( 20       ( 24 Oct 2018 07:14 )             21.9   10-24    140  |  105  |  10  ----------------------------<  87  4.4   |  24  |  0.57    Ca    9.1      24 Oct 2018 07:08  Phos  2.4     10-24  Mg     2.3     10-24    TPro  6.5  /  Alb  3.7  /  TBili  0.5  /  DBili  x   /  AST  14  /  ALT  23  /  AlkPhos  156<H>  10-24      LIVER FUNCTIONS - ( 24 Oct 2018 07:08 )  Alb: 3.7 g/dL / Pro: 6.5 g/dL / ALK PHOS: 156 U/L / ALT: 23 U/L / AST: 14 U/L / GGT: x             Culture - Blood (10.16.18 @ 13:21)    Specimen Source: .Blood Blood-Peripheral    Culture Results:   No growth at 5 days.        Radiology:  < from: CT Head No Cont (10.23.18 @ 16:30) >  IMPRESSION: No change in posterior interhemispheric subdural hemorrhage   and hemorrhage extending along the left tentorial leaf compared with 8:44   AM. No hydrocephalus. Mild cerebellar atrophy.

## 2018-10-24 NOTE — CHART NOTE - NSCHARTNOTEFT_GEN_A_CORE
Called by blood bank, HLA matched platelets now available for pt.  Pt was ordered for half unit of platelets tonight.  Change order to 1 unit platelets, HLA matched available for pt.   RN aware.     Raquel Navarro [facility]  [id number]

## 2018-10-24 NOTE — PROGRESS NOTE ADULT - PROBLEM SELECTOR PLAN 2
Pt is neutropenic, afebrile   If febrile Pan Cx and CXR  Continue Mycamine for ppx (No azoles with Inotuzamab)  10/16 CT C/A/P with Scattered and patchy ground-glass opacities in the right upper and lower lobes, possibly infection. ID dose not feel this is fungal. Abx broadened to Meropenem on 10/17  ID following

## 2018-10-24 NOTE — PROGRESS NOTE ADULT - ASSESSMENT
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 Now being treated with Inatuzumab day 1, 8, 15. The patients hospital course has been complicated by SDH, subconjunctival hemorrhage, neutropenic fevers, transaminitis and upper GIB. The patient has pancytopenia 2/2 disease and/or chemotherapy.

## 2018-10-24 NOTE — PROGRESS NOTE ADULT - ASSESSMENT
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 FISH and PCR negative. s/p Masha-Auto on 12/16/16. 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   Neutropenia  resolved fever   Abnormal CT chest  All cultures negative to date.       Plan:   Can deescalate Isabella to cefepime 2 gm iv q8h given no obvious source of infection and pt afebrile.   On micafungin for prophylaxis.   CT chest non specific, pt asymptomatic and afebrile right now.

## 2018-10-24 NOTE — PROGRESS NOTE ADULT - SUBJECTIVE AND OBJECTIVE BOX
Diagnosis: Relapsed ALL Ph (+)     Protocol/Chemo Regimen: Inotuzamab Day 1,8 and 15    Day: 17    Pt endorsed:     Review of Systems: Denies nausea, vomiting, diarrhea, chest pain, SOB      Pain scale: 0    Diet: Regular    Allergies: No Known Drug Allergies, shellfish (Nausea; Vomiting)    ------------------- Diagnosis: Relapsed ALL Ph (+)     Protocol/Chemo Regimen: Inotuzamab Day 1,8 and 15    Day: 17    Pt endorsed: unchanged headache improved with pain medications     Review of Systems: Denies nausea, vomiting, diarrhea, chest pain, SOB      Pain scale: 8 before pain medication     Diet: Regular    Allergies: No Known Drug Allergies, shellfish (Nausea; Vomiting)    ANTIMICROBIALS  meropenem  IVPB 1000 milliGRAM(s) IV Intermittent every 8 hours  micafungin IVPB 100 milliGRAM(s) IV Intermittent daily    STANDING MEDICATIONS  influenza   Vaccine 0.5 milliLiter(s) IntraMuscular once  levETIRAcetam 500 milliGRAM(s) Oral two times a day  multivitamin 1 Tablet(s) Oral daily  pantoprazole    Tablet 40 milliGRAM(s) Oral before breakfast  sodium chloride 0.9% lock flush 20 milliLiter(s) IV Push once  sodium chloride 0.9%. 1000 milliLiter(s) IV Continuous <Continuous>    PRN MEDICATIONS  acetaminophen   Tablet .. 650 milliGRAM(s) Oral every 6 hours PRN  acetaminophen   Tablet .. 650 milliGRAM(s) Oral every 6 hours PRN  HYDROmorphone  Injectable 0.5 milliGRAM(s) IV Push every 2 hours PRN  HYDROmorphone  Injectable 1 milliGRAM(s) IV Push every 4 hours PRN  metoclopramide Injectable 10 milliGRAM(s) IV Push every 6 hours PRN  sodium chloride 0.9% lock flush 10 milliLiter(s) IV Push every 1 hour PRN  sodium chloride 0.9% lock flush 10 milliLiter(s) IV Push every 12 hours PRN    Vital Signs Last 24 Hrs  T(C): 36.8 (24 Oct 2018 11:24), Max: 37.4 (23 Oct 2018 20:00)  T(F): 98.2 (24 Oct 2018 11:24), Max: 99.4 (23 Oct 2018 22:25)  HR: 87 (24 Oct 2018 11:24) (80 - 98)  BP: 138/87 (24 Oct 2018 11:24) (115/77 - 156/97)  BP(mean): --  RR: 18 (24 Oct 2018 11:24) (18 - 20)  SpO2: 99% (24 Oct 2018 11:24) (95% - 99%)    PHYSICAL EXAM  General: adult in NAD  HEENT: clear oropharynx, no erythema, no ulcers  Neck: supple  CV: normal S1, S2, RRR  Lungs: clear to auscultation, no wheezes, no rales  Abdomen: soft, nontender, nondistended, normal BS  Ext: no edema  Skin: no rashes  Neuro: alert and oriented x 3  Central line: normal     LABS:                        7.5    1.1   )-----------( 20       ( 24 Oct 2018 07:14 )             21.9     Mean Cell Volume : 88.1 fl  Mean Cell Hemoglobin : 30.1 pg  Mean Cell Hemoglobin Concentration : 34.1 gm/dL  Auto Neutrophil # : 0.0 K/uL  Auto Lymphocyte # : 1.0 K/uL  Auto Monocyte # : 0.0 K/uL  Auto Eosinophil # : 0.0 K/uL  Auto Basophil # : 0.0 K/uL  Auto Neutrophil % : 3.0 %  Auto Lymphocyte % : 94.0 %  Auto Monocyte % : 2.0 %  Auto Eosinophil % : x  Auto Basophil % : x    10-24  140  |  105  |  10  ----------------------------<  87  4.4   |  24  |  0.57    Ca    9.1      24 Oct 2018 07:08  Phos  2.2     10-24  Mg     2.3     10-24    TPro  6.5  /  Alb  3.7  /  TBili  0.5  /  DBili  x   /  AST  14  /  ALT  23  /  AlkPhos  156<H>  10-24    Mg 2.3  Phos 2.2  Mg --  Phos 2.2      Uric Acid 1.8    RADIOLOGY & ADDITIONAL STUDIES:    < from: CT Head No Cont (10.23.18 @ 16:30) >  IMPRESSION: No change in posterior interhemispheric subdural hemorrhage   and hemorrhage extending along the left tentorial leaf compared with 8:44   AM. No hydrocephalus. Mild cerebellar atrophy.

## 2018-10-24 NOTE — PROGRESS NOTE ADULT - PROBLEM SELECTOR PLAN 1
10/5 Peripheral flow confirms relapse with BCR/ABL rearrangement in 79.5% of cells  Continue Inotuzamab (Day 1, 8 and 15)  For BM bx after second cycle  Monitor CBC/Lytes and transfuse/replete PRN  Thrombocytopenia with SDH: Patient platelet refractory, per blood bank patient to receive 1/2 bags over 3 hours q 12 hours when HLA not available   Strict Is and Os/Daily weights/Mouth Care  Antiemetics, IVF

## 2018-10-24 NOTE — PROGRESS NOTE ADULT - PROBLEM SELECTOR PLAN 3
10/4 CT Head revealed small bilateral acute on chronic convexity subdural hematomas. Repeat CT Head on 10/5, 10/6 and 10/11 is stable with 10/16 results showing SDH decreasing in size. Repeat CT Head today with some small area of bleeding into previous collection. Discussed with Neuro Sx will repeat CT Head in 6 hours which was unchanged   Continue Keppra 500mg BID for seizure ppx   Keep PLT >80K  Neuro checks q4 hrs  Pain management   Neurosurgery following, no intervention at this time. Continue to keep platelet goal above 80K and do not tap Omaya until platelets are at a minimum of 50K as SDH may increase 10/4 CT Head revealed small bilateral acute on chronic convexity subdural hematomas. Repeat CT Head on 10/5, 10/6 and 10/11 is stable with 10/16 results showing SDH decreasing in size. Repeat CT Head 10/13 with some small area of bleeding into previous collection. Per Neuro Sx, repeat CT Head in 6 hours was unchanged   Continue Keppra 500mg BID for seizure ppx   Keep PLT > 80K  Neuro checks q 4 hrs  Pain management   Neurosurgery following, no intervention at this time. Continue to keep platelet goal above 80K and do not tap Omaya until platelets are at a minimum of 50K as SDH may increase

## 2018-10-25 LAB
ALBUMIN SERPL ELPH-MCNC: 3.7 G/DL — SIGNIFICANT CHANGE UP (ref 3.3–5)
ALP SERPL-CCNC: 163 U/L — HIGH (ref 40–120)
ALT FLD-CCNC: 16 U/L — SIGNIFICANT CHANGE UP (ref 10–45)
ANION GAP SERPL CALC-SCNC: 10 MMOL/L — SIGNIFICANT CHANGE UP (ref 5–17)
APTT BLD: 29.1 SEC — SIGNIFICANT CHANGE UP (ref 27.5–37.4)
AST SERPL-CCNC: 12 U/L — SIGNIFICANT CHANGE UP (ref 10–40)
BASOPHILS # BLD AUTO: 0 K/UL — SIGNIFICANT CHANGE UP (ref 0–0.2)
BASOPHILS NFR BLD AUTO: 0 % — SIGNIFICANT CHANGE UP (ref 0–2)
BILIRUB SERPL-MCNC: 0.6 MG/DL — SIGNIFICANT CHANGE UP (ref 0.2–1.2)
BUN SERPL-MCNC: 10 MG/DL — SIGNIFICANT CHANGE UP (ref 7–23)
CALCIUM SERPL-MCNC: 9.7 MG/DL — SIGNIFICANT CHANGE UP (ref 8.4–10.5)
CHLORIDE SERPL-SCNC: 101 MMOL/L — SIGNIFICANT CHANGE UP (ref 96–108)
CO2 SERPL-SCNC: 25 MMOL/L — SIGNIFICANT CHANGE UP (ref 22–31)
CREAT SERPL-MCNC: 0.56 MG/DL — SIGNIFICANT CHANGE UP (ref 0.5–1.3)
EOSINOPHIL # BLD AUTO: 0 K/UL — SIGNIFICANT CHANGE UP (ref 0–0.5)
EOSINOPHIL NFR BLD AUTO: 1 % — SIGNIFICANT CHANGE UP (ref 0–6)
FIBRINOGEN PPP-MCNC: 671 MG/DL — HIGH (ref 310–510)
GLUCOSE SERPL-MCNC: 94 MG/DL — SIGNIFICANT CHANGE UP (ref 70–99)
HCT VFR BLD CALC: 21.4 % — LOW (ref 34.5–45)
HGB BLD-MCNC: 7.7 G/DL — LOW (ref 11.5–15.5)
INR BLD: 0.97 RATIO — SIGNIFICANT CHANGE UP (ref 0.88–1.16)
LDH SERPL L TO P-CCNC: 227 U/L — SIGNIFICANT CHANGE UP (ref 50–242)
LYMPHOCYTES # BLD AUTO: 0.9 K/UL — LOW (ref 1–3.3)
LYMPHOCYTES # BLD AUTO: 91.7 % — HIGH (ref 13–44)
MAGNESIUM SERPL-MCNC: 2.4 MG/DL — SIGNIFICANT CHANGE UP (ref 1.6–2.6)
MCHC RBC-ENTMCNC: 31.5 PG — SIGNIFICANT CHANGE UP (ref 27–34)
MCHC RBC-ENTMCNC: 36.1 GM/DL — HIGH (ref 32–36)
MCV RBC AUTO: 87.3 FL — SIGNIFICANT CHANGE UP (ref 80–100)
MONOCYTES # BLD AUTO: 0 K/UL — SIGNIFICANT CHANGE UP (ref 0–0.9)
MONOCYTES NFR BLD AUTO: 1.7 % — LOW (ref 2–14)
NEUTROPHILS # BLD AUTO: 0.1 K/UL — LOW (ref 1.8–7.4)
NEUTROPHILS NFR BLD AUTO: 5.7 % — LOW (ref 43–77)
PHOSPHATE SERPL-MCNC: 2.2 MG/DL — LOW (ref 2.5–4.5)
PLATELET # BLD AUTO: 18 K/UL — CRITICAL LOW (ref 150–400)
POTASSIUM SERPL-MCNC: 4.4 MMOL/L — SIGNIFICANT CHANGE UP (ref 3.5–5.3)
POTASSIUM SERPL-SCNC: 4.4 MMOL/L — SIGNIFICANT CHANGE UP (ref 3.5–5.3)
PROT SERPL-MCNC: 7 G/DL — SIGNIFICANT CHANGE UP (ref 6–8.3)
PROTHROM AB SERPL-ACNC: 10.6 SEC — SIGNIFICANT CHANGE UP (ref 9.8–12.7)
RBC # BLD: 2.45 M/UL — LOW (ref 3.8–5.2)
RBC # FLD: 14.8 % — HIGH (ref 10.3–14.5)
SODIUM SERPL-SCNC: 136 MMOL/L — SIGNIFICANT CHANGE UP (ref 135–145)
URATE SERPL-MCNC: 1.5 MG/DL — LOW (ref 2.5–7)
WBC # BLD: 1 K/UL — CRITICAL LOW (ref 3.8–10.5)
WBC # FLD AUTO: 1 K/UL — CRITICAL LOW (ref 3.8–10.5)

## 2018-10-25 PROCEDURE — 99232 SBSQ HOSP IP/OBS MODERATE 35: CPT

## 2018-10-25 RX ORDER — POTASSIUM PHOSPHATE, MONOBASIC POTASSIUM PHOSPHATE, DIBASIC 236; 224 MG/ML; MG/ML
15 INJECTION, SOLUTION INTRAVENOUS ONCE
Qty: 0 | Refills: 0 | Status: COMPLETED | OUTPATIENT
Start: 2018-10-25 | End: 2018-10-25

## 2018-10-25 RX ADMIN — HYDROMORPHONE HYDROCHLORIDE 1 MILLIGRAM(S): 2 INJECTION INTRAMUSCULAR; INTRAVENOUS; SUBCUTANEOUS at 18:47

## 2018-10-25 RX ADMIN — MEROPENEM 200 MILLIGRAM(S): 1 INJECTION INTRAVENOUS at 22:25

## 2018-10-25 RX ADMIN — LEVETIRACETAM 500 MILLIGRAM(S): 250 TABLET, FILM COATED ORAL at 06:16

## 2018-10-25 RX ADMIN — LEVETIRACETAM 500 MILLIGRAM(S): 250 TABLET, FILM COATED ORAL at 18:27

## 2018-10-25 RX ADMIN — HYDROMORPHONE HYDROCHLORIDE 1 MILLIGRAM(S): 2 INJECTION INTRAMUSCULAR; INTRAVENOUS; SUBCUTANEOUS at 22:26

## 2018-10-25 RX ADMIN — Medication 1 TABLET(S): at 11:46

## 2018-10-25 RX ADMIN — POTASSIUM PHOSPHATE, MONOBASIC POTASSIUM PHOSPHATE, DIBASIC 62.5 MILLIMOLE(S): 236; 224 INJECTION, SOLUTION INTRAVENOUS at 08:23

## 2018-10-25 RX ADMIN — HYDROMORPHONE HYDROCHLORIDE 1 MILLIGRAM(S): 2 INJECTION INTRAMUSCULAR; INTRAVENOUS; SUBCUTANEOUS at 10:35

## 2018-10-25 RX ADMIN — SODIUM CHLORIDE 50 MILLILITER(S): 9 INJECTION INTRAMUSCULAR; INTRAVENOUS; SUBCUTANEOUS at 06:16

## 2018-10-25 RX ADMIN — HYDROMORPHONE HYDROCHLORIDE 1 MILLIGRAM(S): 2 INJECTION INTRAMUSCULAR; INTRAVENOUS; SUBCUTANEOUS at 22:41

## 2018-10-25 RX ADMIN — HYDROMORPHONE HYDROCHLORIDE 1 MILLIGRAM(S): 2 INJECTION INTRAMUSCULAR; INTRAVENOUS; SUBCUTANEOUS at 10:21

## 2018-10-25 RX ADMIN — MEROPENEM 200 MILLIGRAM(S): 1 INJECTION INTRAVENOUS at 06:16

## 2018-10-25 RX ADMIN — MEROPENEM 200 MILLIGRAM(S): 1 INJECTION INTRAVENOUS at 14:28

## 2018-10-25 RX ADMIN — SODIUM CHLORIDE 50 MILLILITER(S): 9 INJECTION INTRAMUSCULAR; INTRAVENOUS; SUBCUTANEOUS at 22:25

## 2018-10-25 RX ADMIN — HYDROMORPHONE HYDROCHLORIDE 1 MILLIGRAM(S): 2 INJECTION INTRAMUSCULAR; INTRAVENOUS; SUBCUTANEOUS at 02:00

## 2018-10-25 RX ADMIN — HYDROMORPHONE HYDROCHLORIDE 1 MILLIGRAM(S): 2 INJECTION INTRAMUSCULAR; INTRAVENOUS; SUBCUTANEOUS at 14:27

## 2018-10-25 RX ADMIN — MICAFUNGIN SODIUM 105 MILLIGRAM(S): 100 INJECTION, POWDER, LYOPHILIZED, FOR SOLUTION INTRAVENOUS at 11:45

## 2018-10-25 RX ADMIN — HYDROMORPHONE HYDROCHLORIDE 1 MILLIGRAM(S): 2 INJECTION INTRAMUSCULAR; INTRAVENOUS; SUBCUTANEOUS at 18:27

## 2018-10-25 RX ADMIN — HYDROMORPHONE HYDROCHLORIDE 1 MILLIGRAM(S): 2 INJECTION INTRAMUSCULAR; INTRAVENOUS; SUBCUTANEOUS at 06:45

## 2018-10-25 RX ADMIN — HYDROMORPHONE HYDROCHLORIDE 1 MILLIGRAM(S): 2 INJECTION INTRAMUSCULAR; INTRAVENOUS; SUBCUTANEOUS at 14:47

## 2018-10-25 RX ADMIN — HYDROMORPHONE HYDROCHLORIDE 1 MILLIGRAM(S): 2 INJECTION INTRAMUSCULAR; INTRAVENOUS; SUBCUTANEOUS at 02:20

## 2018-10-25 RX ADMIN — HYDROMORPHONE HYDROCHLORIDE 1 MILLIGRAM(S): 2 INJECTION INTRAMUSCULAR; INTRAVENOUS; SUBCUTANEOUS at 06:15

## 2018-10-25 RX ADMIN — PANTOPRAZOLE SODIUM 40 MILLIGRAM(S): 20 TABLET, DELAYED RELEASE ORAL at 06:16

## 2018-10-25 NOTE — PROGRESS NOTE ADULT - PROBLEM SELECTOR PLAN 3
10/4 CT Head revealed small bilateral acute on chronic convexity subdural hematomas. Repeat CT Head on 10/5, 10/6 and 10/11 is stable with 10/16 results showing SDH decreasing in size. Repeat CT Head 10/13 with some small area of bleeding into previous collection. Per Neuro Sx, repeat CT Head in 6 hours was unchanged   Continue Keppra 500mg BID for seizure ppx   Keep PLT > 80K  Neuro checks q 4 hrs  Pain management   Neurosurgery following, no intervention at this time. Continue to keep platelet goal above 80K and do not tap Omaya until platelets are at a minimum of 50K as SDH may increase 10/4 CT Head revealed small bilateral acute on chronic convexity subdural hematomas. Repeat CT Head on 10/5, 10/6 and 10/11 is stable with 10/16 results showing SDH decreasing in size. Repeat CT Head 10/23 with some small area of bleeding into previous collection. Per Neuro Sx, repeat CT Head in 6 hours was unchanged  Continue Keppra 500mg BID for seizure ppx   Neuro checks q 4 hrs  Pain management   Neurosurgery following, no intervention at this time. Continue to keep platelet goal above 80K and do not tap Omaya until platelets are at a minimum of 50K as SDH may increase

## 2018-10-25 NOTE — PROGRESS NOTE ADULT - SUBJECTIVE AND OBJECTIVE BOX
Diagnosis: Relapsed ALL Ph (+)     Protocol/Chemo Regimen: Inotuzamab Day 1,8 and 15    Day: 18    Pt endorsed: unchanged headache improved with pain medications     Review of Systems: Denies nausea, vomiting, diarrhea, chest pain, SOB      Pain scale: 8 before pain medication     Diet: Regular    Allergies: No Known Drug Allergies, shellfish (Nausea; Vomiting)    ------------------------- Diagnosis: Relapsed ALL Ph (+)     Protocol/Chemo Regimen: Inotuzamab Day 1,8 and 15    Day: 18    Pt endorsed: unchanged headache improved with pain medications     Review of Systems: Denies nausea, vomiting, diarrhea, chest pain, SOB      Pain scale: 8 before pain medication     Diet: Regular    Allergies: No Known Drug Allergies, shellfish (Nausea; Vomiting)    ANTIMICROBIALS  meropenem  IVPB 1000 milliGRAM(s) IV Intermittent every 8 hours  micafungin IVPB 100 milliGRAM(s) IV Intermittent daily    STANDING MEDICATIONS  influenza   Vaccine 0.5 milliLiter(s) IntraMuscular once  levETIRAcetam 500 milliGRAM(s) Oral two times a day  multivitamin 1 Tablet(s) Oral daily  pantoprazole    Tablet 40 milliGRAM(s) Oral before breakfast  potassium phosphate IVPB 15 milliMole(s) IV Intermittent once  sodium chloride 0.9% lock flush 20 milliLiter(s) IV Push once  sodium chloride 0.9%. 1000 milliLiter(s) IV Continuous <Continuous>    PRN MEDICATIONS  acetaminophen   Tablet .. 650 milliGRAM(s) Oral every 6 hours PRN  acetaminophen   Tablet .. 650 milliGRAM(s) Oral every 6 hours PRN  HYDROmorphone  Injectable 0.5 milliGRAM(s) IV Push every 2 hours PRN  HYDROmorphone  Injectable 1 milliGRAM(s) IV Push every 4 hours PRN  metoclopramide Injectable 10 milliGRAM(s) IV Push every 6 hours PRN  sodium chloride 0.9% lock flush 10 milliLiter(s) IV Push every 1 hour PRN  sodium chloride 0.9% lock flush 10 milliLiter(s) IV Push every 12 hours PRN    Vital Signs Last 24 Hrs  T(C): 37.1 (25 Oct 2018 05:07), Max: 37.9 (24 Oct 2018 21:00)  T(F): 98.8 (25 Oct 2018 05:07), Max: 100.2 (24 Oct 2018 21:00)  HR: 86 (25 Oct 2018 05:07) (85 - 91)  BP: 128/82 (25 Oct 2018 05:07) (120/73 - 154/85)  BP(mean): --  RR: 18 (25 Oct 2018 05:07) (18 - 18)  SpO2: 100% (25 Oct 2018 05:07) (97% - 100%)    PHYSICAL EXAM  General: adult in NAD  HEENT: clear oropharynx, no erythema, no ulcers  Neck: supple  CV: normal S1, S2, RRR  Lungs: clear to auscultation, no wheezes, no rales  Abdomen: soft, nontender, nondistended, normal BS  Ext: no edema  Skin: no rashes  Neuro: alert and oriented x 3  Central line: normal     LABS:                        7.7    1.0   )-----------( 18       ( 25 Oct 2018 06:40 )             21.4     Mean Cell Volume : 87.3 fl  Mean Cell Hemoglobin : 31.5 pg  Mean Cell Hemoglobin Concentration : 36.1 gm/dL  Auto Neutrophil # : 0.1 K/uL  Auto Lymphocyte # : 0.9 K/uL  Auto Monocyte # : 0.0 K/uL  Auto Eosinophil # : 0.0 K/uL  Auto Basophil # : 0.0 K/uL  Auto Neutrophil % : 5.7 %  Auto Lymphocyte % : 91.7 %  Auto Monocyte % : 1.7 %  Auto Eosinophil % : 1.0 %  Auto Basophil % : 0.0 %    10-25  136  |  101  |  10  ----------------------------<  94  4.4   |  25  |  0.56    Ca    9.7      25 Oct 2018 06:40  Phos  2.2     10-25  Mg     2.4     10-25    TPro  7.0  /  Alb  3.7  /  TBili  0.6  /  DBili  x   /  AST  12  /  ALT  16  /  AlkPhos  163<H>  10-25    Mg 2.4  Phos 2.2  Mg --  Phos 2.4    PT/INR - ( 25 Oct 2018 06:40 )   PT: 10.6 sec;   INR: 0.97 ratio       PTT - ( 25 Oct 2018 06:40 )  PTT:29.1 sec    Uric Acid 1.5    RADIOLOGY & ADDITIONAL STUDIES:    < from: CT Head No Cont (10.23.18 @ 16:30) >  IMPRESSION: No change in posterior interhemispheric subdural hemorrhage   and hemorrhage extending along the left tentorial leaf compared with 8:44   AM. No hydrocephalus. Mild cerebellar atrophy. Diagnosis: Relapsed ALL Ph (+)     Protocol/Chemo Regimen: S/p Inotuzamab Day 1,8 and 15    Day: 18    Pt endorsed: unchanged headache improved with pain medications     Review of Systems: Denies nausea, vomiting, diarrhea, chest pain, SOB      Pain scale: 8 before pain medication     Diet: Regular    Allergies: No Known Drug Allergies, shellfish (Nausea; Vomiting)    ANTIMICROBIALS  meropenem  IVPB 1000 milliGRAM(s) IV Intermittent every 8 hours  micafungin IVPB 100 milliGRAM(s) IV Intermittent daily    STANDING MEDICATIONS  influenza   Vaccine 0.5 milliLiter(s) IntraMuscular once  levETIRAcetam 500 milliGRAM(s) Oral two times a day  multivitamin 1 Tablet(s) Oral daily  pantoprazole    Tablet 40 milliGRAM(s) Oral before breakfast  potassium phosphate IVPB 15 milliMole(s) IV Intermittent once  sodium chloride 0.9% lock flush 20 milliLiter(s) IV Push once  sodium chloride 0.9%. 1000 milliLiter(s) IV Continuous <Continuous>    PRN MEDICATIONS  acetaminophen   Tablet .. 650 milliGRAM(s) Oral every 6 hours PRN  acetaminophen   Tablet .. 650 milliGRAM(s) Oral every 6 hours PRN  HYDROmorphone  Injectable 0.5 milliGRAM(s) IV Push every 2 hours PRN  HYDROmorphone  Injectable 1 milliGRAM(s) IV Push every 4 hours PRN  metoclopramide Injectable 10 milliGRAM(s) IV Push every 6 hours PRN  sodium chloride 0.9% lock flush 10 milliLiter(s) IV Push every 1 hour PRN  sodium chloride 0.9% lock flush 10 milliLiter(s) IV Push every 12 hours PRN    Vital Signs Last 24 Hrs  T(C): 37.1 (25 Oct 2018 05:07), Max: 37.9 (24 Oct 2018 21:00)  T(F): 98.8 (25 Oct 2018 05:07), Max: 100.2 (24 Oct 2018 21:00)  HR: 86 (25 Oct 2018 05:07) (85 - 91)  BP: 128/82 (25 Oct 2018 05:07) (120/73 - 154/85)  BP(mean): --  RR: 18 (25 Oct 2018 05:07) (18 - 18)  SpO2: 100% (25 Oct 2018 05:07) (97% - 100%)    PHYSICAL EXAM  General: adult in NAD  HEENT: clear oropharynx, no erythema, no ulcers  Neck: supple  CV: normal S1, S2, RRR  Lungs: clear to auscultation, no wheezes, no rales  Abdomen: soft, nontender, nondistended, normal BS  Ext: no edema  Skin: no rashes  Neuro: alert and oriented x 3  Central line: normal     LABS:                        7.7    1.0   )-----------( 18       ( 25 Oct 2018 06:40 )             21.4     Mean Cell Volume : 87.3 fl  Mean Cell Hemoglobin : 31.5 pg  Mean Cell Hemoglobin Concentration : 36.1 gm/dL  Auto Neutrophil # : 0.1 K/uL  Auto Lymphocyte # : 0.9 K/uL  Auto Monocyte # : 0.0 K/uL  Auto Eosinophil # : 0.0 K/uL  Auto Basophil # : 0.0 K/uL  Auto Neutrophil % : 5.7 %  Auto Lymphocyte % : 91.7 %  Auto Monocyte % : 1.7 %  Auto Eosinophil % : 1.0 %  Auto Basophil % : 0.0 %    10-25  136  |  101  |  10  ----------------------------<  94  4.4   |  25  |  0.56    Ca    9.7      25 Oct 2018 06:40  Phos  2.2     10-25  Mg     2.4     10-25    TPro  7.0  /  Alb  3.7  /  TBili  0.6  /  DBili  x   /  AST  12  /  ALT  16  /  AlkPhos  163<H>  10-25    Mg 2.4  Phos 2.2  Mg --  Phos 2.4    PT/INR - ( 25 Oct 2018 06:40 )   PT: 10.6 sec;   INR: 0.97 ratio       PTT - ( 25 Oct 2018 06:40 )  PTT:29.1 sec    Uric Acid 1.5    RADIOLOGY & ADDITIONAL STUDIES:    < from: CT Head No Cont (10.23.18 @ 16:30) >  IMPRESSION: No change in posterior interhemispheric subdural hemorrhage   and hemorrhage extending along the left tentorial leaf compared with 8:44   AM. No hydrocephalus. Mild cerebellar atrophy. Diagnosis: Relapsed ALL Ph (+)     Protocol/Chemo Regimen: S/p Inotuzamab Day 1, 8 and 15    Day: 18    Pt endorsed: unchanged headache improved with pain medications     Review of Systems: Denies nausea, vomiting, diarrhea, chest pain, SOB      Pain scale: 8 before pain medication     Diet: Regular    Allergies: No Known Drug Allergies, shellfish (Nausea; Vomiting)    ANTIMICROBIALS  meropenem  IVPB 1000 milliGRAM(s) IV Intermittent every 8 hours  micafungin IVPB 100 milliGRAM(s) IV Intermittent daily    STANDING MEDICATIONS  influenza   Vaccine 0.5 milliLiter(s) IntraMuscular once  levETIRAcetam 500 milliGRAM(s) Oral two times a day  multivitamin 1 Tablet(s) Oral daily  pantoprazole    Tablet 40 milliGRAM(s) Oral before breakfast  potassium phosphate IVPB 15 milliMole(s) IV Intermittent once  sodium chloride 0.9% lock flush 20 milliLiter(s) IV Push once  sodium chloride 0.9%. 1000 milliLiter(s) IV Continuous <Continuous>    PRN MEDICATIONS  acetaminophen   Tablet .. 650 milliGRAM(s) Oral every 6 hours PRN  acetaminophen   Tablet .. 650 milliGRAM(s) Oral every 6 hours PRN  HYDROmorphone  Injectable 0.5 milliGRAM(s) IV Push every 2 hours PRN  HYDROmorphone  Injectable 1 milliGRAM(s) IV Push every 4 hours PRN  metoclopramide Injectable 10 milliGRAM(s) IV Push every 6 hours PRN  sodium chloride 0.9% lock flush 10 milliLiter(s) IV Push every 1 hour PRN  sodium chloride 0.9% lock flush 10 milliLiter(s) IV Push every 12 hours PRN    Vital Signs Last 24 Hrs  T(C): 37.1 (25 Oct 2018 05:07), Max: 37.9 (24 Oct 2018 21:00)  T(F): 98.8 (25 Oct 2018 05:07), Max: 100.2 (24 Oct 2018 21:00)  HR: 86 (25 Oct 2018 05:07) (85 - 91)  BP: 128/82 (25 Oct 2018 05:07) (120/73 - 154/85)  BP(mean): --  RR: 18 (25 Oct 2018 05:07) (18 - 18)  SpO2: 100% (25 Oct 2018 05:07) (97% - 100%)    PHYSICAL EXAM  General: adult in NAD  HEENT: clear oropharynx, no erythema, no ulcers  Neck: supple  CV: normal S1, S2, RRR  Lungs: clear to auscultation, no wheezes, no rales  Abdomen: soft, nontender, nondistended, normal BS  Ext: no edema  Skin: no rashes  Neuro: alert and oriented x 3  Central line: normal     LABS:                        7.7    1.0   )-----------( 18       ( 25 Oct 2018 06:40 )             21.4     Mean Cell Volume : 87.3 fl  Mean Cell Hemoglobin : 31.5 pg  Mean Cell Hemoglobin Concentration : 36.1 gm/dL  Auto Neutrophil # : 0.1 K/uL  Auto Lymphocyte # : 0.9 K/uL  Auto Monocyte # : 0.0 K/uL  Auto Eosinophil # : 0.0 K/uL  Auto Basophil # : 0.0 K/uL  Auto Neutrophil % : 5.7 %  Auto Lymphocyte % : 91.7 %  Auto Monocyte % : 1.7 %  Auto Eosinophil % : 1.0 %  Auto Basophil % : 0.0 %    10-25  136  |  101  |  10  ----------------------------<  94  4.4   |  25  |  0.56    Ca    9.7      25 Oct 2018 06:40  Phos  2.2     10-25  Mg     2.4     10-25    TPro  7.0  /  Alb  3.7  /  TBili  0.6  /  DBili  x   /  AST  12  /  ALT  16  /  AlkPhos  163<H>  10-25    Mg 2.4  Phos 2.2  Mg --  Phos 2.4    PT/INR - ( 25 Oct 2018 06:40 )   PT: 10.6 sec;   INR: 0.97 ratio       PTT - ( 25 Oct 2018 06:40 )  PTT:29.1 sec    Uric Acid 1.5    RADIOLOGY & ADDITIONAL STUDIES:    < from: CT Head No Cont (10.23.18 @ 16:30) >  IMPRESSION: No change in posterior interhemispheric subdural hemorrhage   and hemorrhage extending along the left tentorial leaf compared with 8:44   AM. No hydrocephalus. Mild cerebellar atrophy.    Culture - Blood (10.16.18 @ 13:21)    Specimen Source: .Blood Blood-Peripheral    Culture Results:   No growth at 5 days.    Culture - Blood (10.16.18 @ 13:20)    Specimen Source: .Blood PICC/PERC Double Lumen BLUE    Culture Results:   No growth at 5 days.

## 2018-10-25 NOTE — PROGRESS NOTE ADULT - ATTENDING COMMENTS
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 FISH and PCR negative. s/p Masha-Auto on 12/16/16. Pt was on sprycel maintenance. Admitted for subdural hematoma 10/4 in setting of severe thrombocytopenia 2/2 blast crisis and found to have ALL relapse. Has completed Inotuzumab. Headaches improved. No accompanying symptoms.  Cont keppra px.  Repeat head CT shows small re-bleed. Follow  up repeat CT stable.    - GI bleed has resolved, Continue po protonix.  -Afebrile today. BCx/UCx negative 10/13, 10/16.  CT c/a/p 10/16- Scattered and patchy groundglass opacities in the right upper and lower lobes, possibly infection.  Heterogeneous thickening of the endometrial cavity.  Continue vanco, meropenem, mycamine.    - goal plt ct >80K, but plt are refractory- transfuse 1/2 units over 3 hours twice a day. Keep hb >7. Obtain HLA matched platelets as possible.  - OOB, ambulation. 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  s/p Masha-Auto on 12/16/16. Pt was on sprycel maintenance. 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 Has completed first cycle Inotuzumab. Headaches improved. No accompanying symptoms.  Cont keppra px.  Repeat head CT shows small re-bleed. Follow  up repeat CT stable.    - GI bleed has resolved, Continue po protonix.  -Afebrile today. BCx/UCx negative 10/13, 10/16.  CT c/a/p 10/16- Scattered and patchy groundglass opacities in the right upper and lower lobes, possibly infection.  Heterogeneous thickening of the endometrial cavity.  Continue vanco, meropenem, mycamine.    - goal plt ct >80K, but plt are refractory- transfuse 1/2 units over 3 hours twice a day. Keep hb >7. Obtain HLA matched platelets as possible.  - OOB, ambulation.

## 2018-10-25 NOTE — PROGRESS NOTE ADULT - PROBLEM SELECTOR PLAN 1
10/5 Peripheral flow confirms relapse with BCR/ABL rearrangement in 79.5% of cells  Continue Inotuzamab (Day 1, 8 and 15)  For BM bx after second cycle  Monitor CBC/Lytes and transfuse/replete PRN  Thrombocytopenia with SDH: Patient platelet refractory, per blood bank patient to receive 1/2 bags over 3 hours q 12 hours when HLA not available   Strict Is and Os/Daily weights/Mouth Care  Antiemetics, IVF  - Hypophosphatemia: replace kphos

## 2018-10-25 NOTE — PROGRESS NOTE ADULT - ASSESSMENT
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 Now being treated with Inatuzumab day 1, 8, 15. The patients hospital course has been complicated by SDH, subconjunctival hemorrhage, neutropenic fevers, transaminitis and upper GIB. The patient has pancytopenia 2/2 disease and/or chemotherapy. 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 Patient is now s/p Cycle 1 Inotuzumab day 1, 8, 15. The patient's hospital course has been complicated by SDH, subconjunctival hemorrhage, neutropenic fevers, transaminitis and upper GIB. The patient has pancytopenia 2/2 disease and/or chemotherapy. 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 Patient is now s/p Cycle 1 Inotuzumab on days 1, 8, 15. The patient's hospital course has been complicated by SDH, subconjunctival hemorrhage, neutropenic fevers, transaminitis and upper GIB. The patient has pancytopenia 2/2 disease and/or chemotherapy.

## 2018-10-25 NOTE — PROGRESS NOTE ADULT - PROBLEM SELECTOR PLAN 2
Pt is neutropenic, afebrile   If febrile Pan Cx and CXR  Continue Mycamine for ppx (No azoles with Inotuzamab)  10/16 CT C/A/P with Scattered and patchy ground-glass opacities in the right upper and lower lobes, possibly infection. ID dose not feel this is fungal. Abx broadened to Meropenem on 10/17  ID following Pt is neutropenic, afebrile   If febrile Pan Cx and CXR  Continue Mycamine for ppx (No azoles with Inotuzamab) and Meropenem   10/16 CT C/A/P with Scattered and patchy ground-glass opacities in the right upper and lower lobes, possibly infection   ID following

## 2018-10-26 LAB
ALBUMIN SERPL ELPH-MCNC: 3.8 G/DL — SIGNIFICANT CHANGE UP (ref 3.3–5)
ALP SERPL-CCNC: 165 U/L — HIGH (ref 40–120)
ALT FLD-CCNC: 15 U/L — SIGNIFICANT CHANGE UP (ref 10–45)
ANION GAP SERPL CALC-SCNC: 8 MMOL/L — SIGNIFICANT CHANGE UP (ref 5–17)
AST SERPL-CCNC: 11 U/L — SIGNIFICANT CHANGE UP (ref 10–40)
BASOPHILS # BLD AUTO: 0 K/UL — SIGNIFICANT CHANGE UP (ref 0–0.2)
BASOPHILS NFR BLD AUTO: 2.8 % — HIGH (ref 0–2)
BILIRUB SERPL-MCNC: 0.6 MG/DL — SIGNIFICANT CHANGE UP (ref 0.2–1.2)
BLD GP AB SCN SERPL QL: POSITIVE — SIGNIFICANT CHANGE UP
BUN SERPL-MCNC: 12 MG/DL — SIGNIFICANT CHANGE UP (ref 7–23)
CALCIUM SERPL-MCNC: 10.2 MG/DL — SIGNIFICANT CHANGE UP (ref 8.4–10.5)
CHLORIDE SERPL-SCNC: 102 MMOL/L — SIGNIFICANT CHANGE UP (ref 96–108)
CO2 SERPL-SCNC: 26 MMOL/L — SIGNIFICANT CHANGE UP (ref 22–31)
CREAT SERPL-MCNC: 0.58 MG/DL — SIGNIFICANT CHANGE UP (ref 0.5–1.3)
EOSINOPHIL # BLD AUTO: 0 K/UL — SIGNIFICANT CHANGE UP (ref 0–0.5)
EOSINOPHIL NFR BLD AUTO: 0.6 % — SIGNIFICANT CHANGE UP (ref 0–6)
FUNGITELL: <31 PG/ML — SIGNIFICANT CHANGE UP (ref 0–59)
GLUCOSE SERPL-MCNC: 97 MG/DL — SIGNIFICANT CHANGE UP (ref 70–99)
HCT VFR BLD CALC: 21.9 % — LOW (ref 34.5–45)
HGB BLD-MCNC: 7.6 G/DL — LOW (ref 11.5–15.5)
LDH SERPL L TO P-CCNC: 220 U/L — SIGNIFICANT CHANGE UP (ref 50–242)
LYMPHOCYTES # BLD AUTO: 0.8 K/UL — LOW (ref 1–3.3)
LYMPHOCYTES # BLD AUTO: 77.6 % — HIGH (ref 13–44)
MAGNESIUM SERPL-MCNC: 2.3 MG/DL — SIGNIFICANT CHANGE UP (ref 1.6–2.6)
MCHC RBC-ENTMCNC: 30.4 PG — SIGNIFICANT CHANGE UP (ref 27–34)
MCHC RBC-ENTMCNC: 34.8 GM/DL — SIGNIFICANT CHANGE UP (ref 32–36)
MCV RBC AUTO: 87.3 FL — SIGNIFICANT CHANGE UP (ref 80–100)
MONOCYTES # BLD AUTO: 0.1 K/UL — SIGNIFICANT CHANGE UP (ref 0–0.9)
MONOCYTES NFR BLD AUTO: 7.1 % — SIGNIFICANT CHANGE UP (ref 2–14)
NEUTROPHILS # BLD AUTO: 0.1 K/UL — LOW (ref 1.8–7.4)
NEUTROPHILS NFR BLD AUTO: 12 % — LOW (ref 43–77)
PHOSPHATE SERPL-MCNC: 2.4 MG/DL — LOW (ref 2.5–4.5)
PLATELET # BLD AUTO: 14 K/UL — CRITICAL LOW (ref 150–400)
POTASSIUM SERPL-MCNC: 4.3 MMOL/L — SIGNIFICANT CHANGE UP (ref 3.5–5.3)
POTASSIUM SERPL-SCNC: 4.3 MMOL/L — SIGNIFICANT CHANGE UP (ref 3.5–5.3)
PROT SERPL-MCNC: 7.1 G/DL — SIGNIFICANT CHANGE UP (ref 6–8.3)
RBC # BLD: 2.51 M/UL — LOW (ref 3.8–5.2)
RBC # FLD: 14.7 % — HIGH (ref 10.3–14.5)
RH IG SCN BLD-IMP: POSITIVE — SIGNIFICANT CHANGE UP
SODIUM SERPL-SCNC: 136 MMOL/L — SIGNIFICANT CHANGE UP (ref 135–145)
URATE SERPL-MCNC: 1.9 MG/DL — LOW (ref 2.5–7)
WBC # BLD: 1.1 K/UL — LOW (ref 3.8–10.5)
WBC # FLD AUTO: 1.1 K/UL — LOW (ref 3.8–10.5)

## 2018-10-26 PROCEDURE — 99232 SBSQ HOSP IP/OBS MODERATE 35: CPT

## 2018-10-26 RX ORDER — POTASSIUM PHOSPHATE, MONOBASIC POTASSIUM PHOSPHATE, DIBASIC 236; 224 MG/ML; MG/ML
15 INJECTION, SOLUTION INTRAVENOUS ONCE
Qty: 0 | Refills: 0 | Status: COMPLETED | OUTPATIENT
Start: 2018-10-26 | End: 2018-10-26

## 2018-10-26 RX ADMIN — SODIUM CHLORIDE 50 MILLILITER(S): 9 INJECTION INTRAMUSCULAR; INTRAVENOUS; SUBCUTANEOUS at 21:21

## 2018-10-26 RX ADMIN — LEVETIRACETAM 500 MILLIGRAM(S): 250 TABLET, FILM COATED ORAL at 06:38

## 2018-10-26 RX ADMIN — MICAFUNGIN SODIUM 105 MILLIGRAM(S): 100 INJECTION, POWDER, LYOPHILIZED, FOR SOLUTION INTRAVENOUS at 11:01

## 2018-10-26 RX ADMIN — HYDROMORPHONE HYDROCHLORIDE 1 MILLIGRAM(S): 2 INJECTION INTRAMUSCULAR; INTRAVENOUS; SUBCUTANEOUS at 15:15

## 2018-10-26 RX ADMIN — HYDROMORPHONE HYDROCHLORIDE 1 MILLIGRAM(S): 2 INJECTION INTRAMUSCULAR; INTRAVENOUS; SUBCUTANEOUS at 02:29

## 2018-10-26 RX ADMIN — HYDROMORPHONE HYDROCHLORIDE 1 MILLIGRAM(S): 2 INJECTION INTRAMUSCULAR; INTRAVENOUS; SUBCUTANEOUS at 15:00

## 2018-10-26 RX ADMIN — HYDROMORPHONE HYDROCHLORIDE 1 MILLIGRAM(S): 2 INJECTION INTRAMUSCULAR; INTRAVENOUS; SUBCUTANEOUS at 06:37

## 2018-10-26 RX ADMIN — Medication 1 TABLET(S): at 11:01

## 2018-10-26 RX ADMIN — PANTOPRAZOLE SODIUM 40 MILLIGRAM(S): 20 TABLET, DELAYED RELEASE ORAL at 06:38

## 2018-10-26 RX ADMIN — HYDROMORPHONE HYDROCHLORIDE 1 MILLIGRAM(S): 2 INJECTION INTRAMUSCULAR; INTRAVENOUS; SUBCUTANEOUS at 23:16

## 2018-10-26 RX ADMIN — MEROPENEM 200 MILLIGRAM(S): 1 INJECTION INTRAVENOUS at 13:09

## 2018-10-26 RX ADMIN — HYDROMORPHONE HYDROCHLORIDE 1 MILLIGRAM(S): 2 INJECTION INTRAMUSCULAR; INTRAVENOUS; SUBCUTANEOUS at 11:00

## 2018-10-26 RX ADMIN — HYDROMORPHONE HYDROCHLORIDE 1 MILLIGRAM(S): 2 INJECTION INTRAMUSCULAR; INTRAVENOUS; SUBCUTANEOUS at 23:01

## 2018-10-26 RX ADMIN — HYDROMORPHONE HYDROCHLORIDE 1 MILLIGRAM(S): 2 INJECTION INTRAMUSCULAR; INTRAVENOUS; SUBCUTANEOUS at 02:44

## 2018-10-26 RX ADMIN — HYDROMORPHONE HYDROCHLORIDE 1 MILLIGRAM(S): 2 INJECTION INTRAMUSCULAR; INTRAVENOUS; SUBCUTANEOUS at 11:15

## 2018-10-26 RX ADMIN — HYDROMORPHONE HYDROCHLORIDE 1 MILLIGRAM(S): 2 INJECTION INTRAMUSCULAR; INTRAVENOUS; SUBCUTANEOUS at 19:15

## 2018-10-26 RX ADMIN — HYDROMORPHONE HYDROCHLORIDE 1 MILLIGRAM(S): 2 INJECTION INTRAMUSCULAR; INTRAVENOUS; SUBCUTANEOUS at 06:52

## 2018-10-26 RX ADMIN — MEROPENEM 200 MILLIGRAM(S): 1 INJECTION INTRAVENOUS at 21:21

## 2018-10-26 RX ADMIN — POTASSIUM PHOSPHATE, MONOBASIC POTASSIUM PHOSPHATE, DIBASIC 62.5 MILLIMOLE(S): 236; 224 INJECTION, SOLUTION INTRAVENOUS at 13:09

## 2018-10-26 RX ADMIN — MEROPENEM 200 MILLIGRAM(S): 1 INJECTION INTRAVENOUS at 06:38

## 2018-10-26 RX ADMIN — LEVETIRACETAM 500 MILLIGRAM(S): 250 TABLET, FILM COATED ORAL at 17:16

## 2018-10-26 RX ADMIN — SODIUM CHLORIDE 50 MILLILITER(S): 9 INJECTION INTRAMUSCULAR; INTRAVENOUS; SUBCUTANEOUS at 06:38

## 2018-10-26 RX ADMIN — HYDROMORPHONE HYDROCHLORIDE 1 MILLIGRAM(S): 2 INJECTION INTRAMUSCULAR; INTRAVENOUS; SUBCUTANEOUS at 19:01

## 2018-10-26 NOTE — PROGRESS NOTE ADULT - ASSESSMENT
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 Patient is now s/p Cycle 1 Inotuzumab on days 1, 8, 15. The patient's hospital course has been complicated by SDH, subconjunctival hemorrhage, neutropenic fevers, transaminitis and upper GIB. The patient has pancytopenia 2/2 disease and/or chemotherapy.

## 2018-10-26 NOTE — PROGRESS NOTE ADULT - PROBLEM SELECTOR PLAN 3
10/4 CT Head revealed small bilateral acute on chronic convexity subdural hematomas. Repeat CT Head on 10/5, 10/6 and 10/11 is stable with 10/16 results showing SDH decreasing in size. Repeat CT Head 10/23 with some small area of bleeding into previous collection. Per Neuro Sx, repeat CT Head in 6 hours was unchanged  Continue Keppra 500mg BID for seizure ppx   Neuro checks q 4 hrs  Pain management   Neurosurgery following, no intervention at this time. Continue to keep platelet goal above 80K and do not tap Omaya until platelets are at a minimum of 50K as SDH may increase

## 2018-10-26 NOTE — PROGRESS NOTE ADULT - PROBLEM SELECTOR PLAN 2
Pt is neutropenic, afebrile   If febrile Pan Cx and CXR  Continue Mycamine for ppx (No azoles with Inotuzamab) and Meropenem   10/16 CT C/A/P with Scattered and patchy ground-glass opacities in the right upper and lower lobes, possibly infection   ID following

## 2018-10-26 NOTE — PROGRESS NOTE ADULT - SUBJECTIVE AND OBJECTIVE BOX
Diagnosis: Relapsed ALL Ph (+)     Protocol/Chemo Regimen: S/p Cycle 1 Inotuzamab Day 1, 8 and 15    Day: 19    Pt endorsed:     Review of Systems: Denies nausea, vomiting, diarrhea, chest pain, SOB      Pain scale: 8 before pain medication     Diet: Regular    Allergies: No Known Drug Allergies, shellfish (Nausea; Vomiting)    --------------- Diagnosis: Relapsed ALL Ph (+)     Protocol/Chemo Regimen: S/p Cycle 1 Inotuzamab Day 1, 8 and 15    Day: 19    Pt endorsed: documented fever overnight but per patient, temperature was taken right after drinking hot tea. denies chills; unchanged headaches - controlled with pain regimen     Review of Systems: Denies nausea, vomiting, diarrhea, chest pain, SOB      Pain scale: 8 before pain medication     Diet: Regular    Allergies: No Known Drug Allergies, shellfish (Nausea; Vomiting)    ANTIMICROBIALS  meropenem  IVPB 1000 milliGRAM(s) IV Intermittent every 8 hours  micafungin IVPB 100 milliGRAM(s) IV Intermittent daily    STANDING MEDICATIONS  influenza   Vaccine 0.5 milliLiter(s) IntraMuscular once  levETIRAcetam 500 milliGRAM(s) Oral two times a day  multivitamin 1 Tablet(s) Oral daily  pantoprazole    Tablet 40 milliGRAM(s) Oral before breakfast  potassium phosphate IVPB 15 milliMole(s) IV Intermittent once  sodium chloride 0.9% lock flush 20 milliLiter(s) IV Push once  sodium chloride 0.9%. 1000 milliLiter(s) IV Continuous <Continuous>    PRN MEDICATIONS  acetaminophen   Tablet .. 650 milliGRAM(s) Oral every 6 hours PRN  acetaminophen   Tablet .. 650 milliGRAM(s) Oral every 6 hours PRN  HYDROmorphone  Injectable 0.5 milliGRAM(s) IV Push every 2 hours PRN  HYDROmorphone  Injectable 1 milliGRAM(s) IV Push every 4 hours PRN  metoclopramide Injectable 10 milliGRAM(s) IV Push every 6 hours PRN  sodium chloride 0.9% lock flush 10 milliLiter(s) IV Push every 1 hour PRN  sodium chloride 0.9% lock flush 10 milliLiter(s) IV Push every 12 hours PRN    Vital Signs Last 24 Hrs  T(C): 37.6 (26 Oct 2018 05:54), Max: 38.2 (25 Oct 2018 20:34)  T(F): 99.7 (26 Oct 2018 05:54), Max: 100.7 (25 Oct 2018 20:34)  HR: 86 (26 Oct 2018 05:54) (83 - 108)  BP: 127/83 (26 Oct 2018 05:54) (120/84 - 144/88)  BP(mean): --  RR: 18 (26 Oct 2018 05:54) (16 - 18)  SpO2: 100% (26 Oct 2018 05:54) (97% - 100%)    PHYSICAL EXAM  General: adult in NAD  HEENT: clear oropharynx, no erythema, no ulcers  Neck: supple  CV: normal S1, S2, RRR  Lungs: clear to auscultation, no wheezes, no rales  Abdomen: soft, nontender, nondistended, normal BS  Ext: no edema  Skin: no rashes  Neuro: alert and oriented x 3  Central line: normal     LABS:                        7.6    1.1   )-----------( 14       ( 26 Oct 2018 06:58 )             21.9     Mean Cell Volume : 87.3 fl  Mean Cell Hemoglobin : 30.4 pg  Mean Cell Hemoglobin Concentration : 34.8 gm/dL  Auto Neutrophil # : x  Auto Lymphocyte # : x  Auto Monocyte # : x  Auto Eosinophil # : x  Auto Basophil # : x  Auto Neutrophil % : x  Auto Lymphocyte % : x  Auto Monocyte % : x  Auto Eosinophil % : x  Auto Basophil % : x    10-26  136  |  102  |  12  ----------------------------<  97  4.3   |  26  |  0.58    Ca    10.2      26 Oct 2018 06:58  Phos  2.4     10-26  Mg     2.3     10-26    TPro  7.1  /  Alb  3.8  /  TBili  0.6  /  DBili  x   /  AST  11  /  ALT  15  /  AlkPhos  165<H>  10-26    Mg 2.3  Phos 2.4    PT/INR - ( 25 Oct 2018 06:40 )   PT: 10.6 sec;   INR: 0.97 ratio       PTT - ( 25 Oct 2018 06:40 )  PTT:29.1 sec    Uric Acid 1.9    RADIOLOGY & ADDITIONAL STUDIES:    < from: CT Head No Cont (10.23.18 @ 16:30) >    IMPRESSION: No change in posterior interhemispheric subdural hemorrhage   and hemorrhage extending along the left tentorial leaf compared with 8:44   AM. No hydrocephalus. Mild cerebellar atrophy.

## 2018-10-26 NOTE — PROGRESS NOTE ADULT - PROBLEM SELECTOR PLAN 1
10/5 Peripheral flow confirms relapse with BCR/ABL rearrangement in 79.5% of cells  Continue Inotuzamab (Day 1, 8 and 15)  For BM bx after second cycle  Monitor CBC/Lytes and transfuse/replete PRN  Thrombocytopenia with SDH: Patient platelet refractory, per blood bank patient to receive 1/2 bags over 3 hours q 12 hours when HLA not available   Strict Is and Os/Daily weights/Mouth Care  Antiemetics, IVF 10/5 Peripheral flow confirms relapse with BCR/ABL rearrangement in 79.5% of cells  Continue Inotuzamab (Day 1, 8 and 15)  For BM bx after second cycle  Monitor CBC/Lytes and transfuse/replete PRN  Thrombocytopenia with SDH: Patient platelet refractory, per blood bank patient to receive 1/2 bags over 3 hours q 12 hours when HLA not available   Strict Is and Os/Daily weights/Mouth Care  Antiemetics, IVF  - Hypophosphatemia: replace kphos

## 2018-10-26 NOTE — PROGRESS NOTE ADULT - ATTENDING COMMENTS
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  s/p Masha-Auto on 12/16/16. Pt was on sprycel maintenance. 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 Has completed first cycle Inotuzumab. Headaches improved. No accompanying symptoms.  Cont keppra px.  Repeat head CT shows small re-bleed. Follow  up repeat CT stable.    - GI bleed has resolved, Continue po protonix.  -Afebrile today. BCx/UCx negative 10/13, 10/16.  CT c/a/p 10/16- Scattered and patchy groundglass opacities in the right upper and lower lobes, possibly infection.  Heterogeneous thickening of the endometrial cavity.  Continue vanco, meropenem, mycamine.    - goal plt ct >80K, but plt are refractory- transfuse 1/2 units over 3 hours twice a day. Keep hb >7. Obtain HLA matched platelets as possible.  - OOB, ambulation. 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  s/p Masha-Auto on 12/16/16. Pt was on sprycel maintenance. 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 Has completed first cycle Inotuzumab. Headaches improved. No accompanying symptoms.  Cont keppra px.  Repeat head CT shows small re-bleed. Follow  up repeat CT stable.  Neutrophil percentage rising.   - GI bleed has resolved, Continue po protonix.  -Afebrile today. BCx/UCx negative 10/13, 10/16.  CT c/a/p 10/16- Scattered and patchy ground glass opacities in the right upper and lower lobes, possibly infection.  Heterogeneous thickening of the endometrial cavity.  Continue vanco, meropenem, mycamine.    - goal plt ct >80K, but plt are refractory- transfuse 1/2 units over 3 hours twice a day. Keep hb >7. Obtain HLA matched platelets as possible.  - OOB, ambulation.

## 2018-10-26 NOTE — PROVIDER CONTACT NOTE (CRITICAL VALUE NOTIFICATION) - NAME OF MD/NP/PA/DO NOTIFIED:
Addended by: ABE GRIFFITHS on: 12/26/2017 11:51 AM     Modules accepted: Orders     Shelly Clemente NP

## 2018-10-27 LAB
ALBUMIN SERPL ELPH-MCNC: 3.8 G/DL — SIGNIFICANT CHANGE UP (ref 3.3–5)
ALP SERPL-CCNC: 167 U/L — HIGH (ref 40–120)
ALT FLD-CCNC: 17 U/L — SIGNIFICANT CHANGE UP (ref 10–45)
ANION GAP SERPL CALC-SCNC: 11 MMOL/L — SIGNIFICANT CHANGE UP (ref 5–17)
AST SERPL-CCNC: 12 U/L — SIGNIFICANT CHANGE UP (ref 10–40)
BASOPHILS # BLD AUTO: 0 K/UL — SIGNIFICANT CHANGE UP (ref 0–0.2)
BASOPHILS NFR BLD AUTO: 0 % — SIGNIFICANT CHANGE UP (ref 0–2)
BILIRUB SERPL-MCNC: 0.5 MG/DL — SIGNIFICANT CHANGE UP (ref 0.2–1.2)
BUN SERPL-MCNC: 15 MG/DL — SIGNIFICANT CHANGE UP (ref 7–23)
CALCIUM SERPL-MCNC: 10.7 MG/DL — HIGH (ref 8.4–10.5)
CHLORIDE SERPL-SCNC: 101 MMOL/L — SIGNIFICANT CHANGE UP (ref 96–108)
CO2 SERPL-SCNC: 24 MMOL/L — SIGNIFICANT CHANGE UP (ref 22–31)
CREAT SERPL-MCNC: 0.6 MG/DL — SIGNIFICANT CHANGE UP (ref 0.5–1.3)
EOSINOPHIL # BLD AUTO: 0 K/UL — SIGNIFICANT CHANGE UP (ref 0–0.5)
EOSINOPHIL NFR BLD AUTO: 0.4 % — SIGNIFICANT CHANGE UP (ref 0–6)
GLUCOSE SERPL-MCNC: 96 MG/DL — SIGNIFICANT CHANGE UP (ref 70–99)
HCT VFR BLD CALC: 20.1 % — CRITICAL LOW (ref 34.5–45)
HGB BLD-MCNC: 7.5 G/DL — LOW (ref 11.5–15.5)
LDH SERPL L TO P-CCNC: 212 U/L — SIGNIFICANT CHANGE UP (ref 50–242)
LYMPHOCYTES # BLD AUTO: 0.7 K/UL — LOW (ref 1–3.3)
LYMPHOCYTES # BLD AUTO: 74.4 % — HIGH (ref 13–44)
MAGNESIUM SERPL-MCNC: 2.3 MG/DL — SIGNIFICANT CHANGE UP (ref 1.6–2.6)
MCHC RBC-ENTMCNC: 32.6 PG — SIGNIFICANT CHANGE UP (ref 27–34)
MCHC RBC-ENTMCNC: 37.2 GM/DL — HIGH (ref 32–36)
MCV RBC AUTO: 87.6 FL — SIGNIFICANT CHANGE UP (ref 80–100)
MONOCYTES # BLD AUTO: 0.1 K/UL — SIGNIFICANT CHANGE UP (ref 0–0.9)
MONOCYTES NFR BLD AUTO: 6.9 % — SIGNIFICANT CHANGE UP (ref 2–14)
NEUTROPHILS # BLD AUTO: 0.2 K/UL — LOW (ref 1.8–7.4)
NEUTROPHILS NFR BLD AUTO: 18.3 % — LOW (ref 43–77)
PHOSPHATE SERPL-MCNC: 2.8 MG/DL — SIGNIFICANT CHANGE UP (ref 2.5–4.5)
PLATELET # BLD AUTO: 18 K/UL — CRITICAL LOW (ref 150–400)
POTASSIUM SERPL-MCNC: 4.6 MMOL/L — SIGNIFICANT CHANGE UP (ref 3.5–5.3)
POTASSIUM SERPL-SCNC: 4.6 MMOL/L — SIGNIFICANT CHANGE UP (ref 3.5–5.3)
PROT SERPL-MCNC: 6.8 G/DL — SIGNIFICANT CHANGE UP (ref 6–8.3)
RBC # BLD: 2.29 M/UL — LOW (ref 3.8–5.2)
RBC # FLD: 14.8 % — HIGH (ref 10.3–14.5)
SODIUM SERPL-SCNC: 136 MMOL/L — SIGNIFICANT CHANGE UP (ref 135–145)
URATE SERPL-MCNC: 1.7 MG/DL — LOW (ref 2.5–7)
WBC # BLD: 0.9 K/UL — CRITICAL LOW (ref 3.8–10.5)
WBC # FLD AUTO: 0.9 K/UL — CRITICAL LOW (ref 3.8–10.5)

## 2018-10-27 PROCEDURE — 99232 SBSQ HOSP IP/OBS MODERATE 35: CPT | Mod: GC

## 2018-10-27 RX ORDER — HYDROMORPHONE HYDROCHLORIDE 2 MG/ML
0.5 INJECTION INTRAMUSCULAR; INTRAVENOUS; SUBCUTANEOUS EVERY 6 HOURS
Qty: 0 | Refills: 0 | Status: DISCONTINUED | OUTPATIENT
Start: 2018-10-27 | End: 2018-11-01

## 2018-10-27 RX ORDER — HYDROMORPHONE HYDROCHLORIDE 2 MG/ML
1 INJECTION INTRAMUSCULAR; INTRAVENOUS; SUBCUTANEOUS ONCE
Qty: 0 | Refills: 0 | Status: DISCONTINUED | OUTPATIENT
Start: 2018-10-27 | End: 2018-10-27

## 2018-10-27 RX ORDER — HYDROMORPHONE HYDROCHLORIDE 2 MG/ML
1 INJECTION INTRAMUSCULAR; INTRAVENOUS; SUBCUTANEOUS EVERY 4 HOURS
Qty: 0 | Refills: 0 | Status: DISCONTINUED | OUTPATIENT
Start: 2018-10-27 | End: 2018-11-01

## 2018-10-27 RX ADMIN — MICAFUNGIN SODIUM 105 MILLIGRAM(S): 100 INJECTION, POWDER, LYOPHILIZED, FOR SOLUTION INTRAVENOUS at 12:32

## 2018-10-27 RX ADMIN — MEROPENEM 200 MILLIGRAM(S): 1 INJECTION INTRAVENOUS at 14:27

## 2018-10-27 RX ADMIN — HYDROMORPHONE HYDROCHLORIDE 1 MILLIGRAM(S): 2 INJECTION INTRAMUSCULAR; INTRAVENOUS; SUBCUTANEOUS at 20:02

## 2018-10-27 RX ADMIN — PANTOPRAZOLE SODIUM 40 MILLIGRAM(S): 20 TABLET, DELAYED RELEASE ORAL at 06:23

## 2018-10-27 RX ADMIN — HYDROMORPHONE HYDROCHLORIDE 0.5 MILLIGRAM(S): 2 INJECTION INTRAMUSCULAR; INTRAVENOUS; SUBCUTANEOUS at 09:03

## 2018-10-27 RX ADMIN — HYDROMORPHONE HYDROCHLORIDE 1 MILLIGRAM(S): 2 INJECTION INTRAMUSCULAR; INTRAVENOUS; SUBCUTANEOUS at 23:45

## 2018-10-27 RX ADMIN — HYDROMORPHONE HYDROCHLORIDE 0.5 MILLIGRAM(S): 2 INJECTION INTRAMUSCULAR; INTRAVENOUS; SUBCUTANEOUS at 18:05

## 2018-10-27 RX ADMIN — SODIUM CHLORIDE 50 MILLILITER(S): 9 INJECTION INTRAMUSCULAR; INTRAVENOUS; SUBCUTANEOUS at 06:23

## 2018-10-27 RX ADMIN — LEVETIRACETAM 500 MILLIGRAM(S): 250 TABLET, FILM COATED ORAL at 06:23

## 2018-10-27 RX ADMIN — LEVETIRACETAM 500 MILLIGRAM(S): 250 TABLET, FILM COATED ORAL at 17:48

## 2018-10-27 RX ADMIN — HYDROMORPHONE HYDROCHLORIDE 0.5 MILLIGRAM(S): 2 INJECTION INTRAMUSCULAR; INTRAVENOUS; SUBCUTANEOUS at 17:47

## 2018-10-27 RX ADMIN — HYDROMORPHONE HYDROCHLORIDE 1 MILLIGRAM(S): 2 INJECTION INTRAMUSCULAR; INTRAVENOUS; SUBCUTANEOUS at 19:47

## 2018-10-27 RX ADMIN — Medication 1 TABLET(S): at 12:32

## 2018-10-27 RX ADMIN — HYDROMORPHONE HYDROCHLORIDE 1 MILLIGRAM(S): 2 INJECTION INTRAMUSCULAR; INTRAVENOUS; SUBCUTANEOUS at 13:07

## 2018-10-27 RX ADMIN — HYDROMORPHONE HYDROCHLORIDE 1 MILLIGRAM(S): 2 INJECTION INTRAMUSCULAR; INTRAVENOUS; SUBCUTANEOUS at 13:25

## 2018-10-27 RX ADMIN — SODIUM CHLORIDE 50 MILLILITER(S): 9 INJECTION INTRAMUSCULAR; INTRAVENOUS; SUBCUTANEOUS at 22:26

## 2018-10-27 RX ADMIN — HYDROMORPHONE HYDROCHLORIDE 1 MILLIGRAM(S): 2 INJECTION INTRAMUSCULAR; INTRAVENOUS; SUBCUTANEOUS at 03:52

## 2018-10-27 RX ADMIN — MEROPENEM 200 MILLIGRAM(S): 1 INJECTION INTRAVENOUS at 22:29

## 2018-10-27 RX ADMIN — HYDROMORPHONE HYDROCHLORIDE 0.5 MILLIGRAM(S): 2 INJECTION INTRAMUSCULAR; INTRAVENOUS; SUBCUTANEOUS at 09:15

## 2018-10-27 RX ADMIN — SODIUM CHLORIDE 50 MILLILITER(S): 9 INJECTION INTRAMUSCULAR; INTRAVENOUS; SUBCUTANEOUS at 18:46

## 2018-10-27 RX ADMIN — MEROPENEM 200 MILLIGRAM(S): 1 INJECTION INTRAVENOUS at 06:22

## 2018-10-27 RX ADMIN — HYDROMORPHONE HYDROCHLORIDE 1 MILLIGRAM(S): 2 INJECTION INTRAMUSCULAR; INTRAVENOUS; SUBCUTANEOUS at 03:32

## 2018-10-27 NOTE — PROGRESS NOTE ADULT - ATTENDING COMMENTS
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  s/p Masha-Auto on 12/16/16. Pt was on sprycel maintenance. 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 Has completed first cycle Inotuzumab. Headaches improved. No accompanying symptoms.  Cont keppra px.  Repeat head CT shows small re-bleed. Follow  up repeat CT stable.  Neutrophil percentage rising.   - GI bleed has resolved, Continue po protonix.  -Afebrile today. BCx/UCx negative 10/13, 10/16.  CT c/a/p 10/16- Scattered and patchy ground glass opacities in the right upper and lower lobes, possibly infection.  Heterogeneous thickening of the endometrial cavity.  Continue vanco, meropenem, mycamine.    - goal plt ct >80K, but plt are refractory- transfuse 1/2 units over 3 hours twice a day. Keep hb >7. Obtain HLA matched platelets as possible.  - OOB, ambulation. 58F Ph(+) ALL s/p R HyperCVAD x 4 cycles IT MTX x2 s/p stem cell collection w/ Step 1(HD vp16 plus bernardino-c) regimen.  s/p Masha-Auto on 12/16/16. Pt was on Sprycel maintenance. 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 Has completed first cycle Inotuzumab. Headaches improved. No accompanying symptoms.  Cont Keppra prophylaxis.  Repeat head CT shows small re-bleed. Follow up repeat CT stable.  Neutrophil percentage rising.     -GI bleed has resolved, Continue po protonix.  -Afebrile today. BCx/UCx negative 10/13, 10/16.  CT c/a/p 10/16- Scattered and patchy ground glass opacities in the right upper and lower lobes, possibly infection.  Heterogeneous thickening of the endometrial cavity.  Continue vancomycin, meropenem, Mycamine.    - goal plt ct >80K, but plt are refractory- transfuse 1/2 units over 3 hours twice a day. Keep hb >7. Obtain HLA matched platelets as possible.  - OOB, ambulation.

## 2018-10-27 NOTE — PROGRESS NOTE ADULT - SUBJECTIVE AND OBJECTIVE BOX
Diagnosis: Relapsed ALL Ph (+)     Protocol/Chemo Regimen: S/p Cycle 1 Inotuzamab Day 1, 8 and 15    Day: 20    Pt endorsed:  unchanged headaches - controlled with pain regimen     Review of Systems: Denies nausea, vomiting, diarrhea, chest pain, SOB      Pain scale: 8 before pain medication     Diet: Regular    Allergies: No Known Drug Allergies, shellfish (Nausea; Vomiting)    ANTIMICROBIALS  meropenem  IVPB 1000 milliGRAM(s) IV Intermittent every 8 hours  micafungin IVPB 100 milliGRAM(s) IV Intermittent daily    STANDING MEDICATIONS  influenza   Vaccine 0.5 milliLiter(s) IntraMuscular once  levETIRAcetam 500 milliGRAM(s) Oral two times a day  multivitamin 1 Tablet(s) Oral daily  pantoprazole    Tablet 40 milliGRAM(s) Oral before breakfast  potassium phosphate IVPB 15 milliMole(s) IV Intermittent once  sodium chloride 0.9% lock flush 20 milliLiter(s) IV Push once  sodium chloride 0.9%. 1000 milliLiter(s) IV Continuous <Continuous>    PRN MEDICATIONS  acetaminophen   Tablet .. 650 milliGRAM(s) Oral every 6 hours PRN  acetaminophen   Tablet .. 650 milliGRAM(s) Oral every 6 hours PRN  HYDROmorphone  Injectable 0.5 milliGRAM(s) IV Push every 2 hours PRN  HYDROmorphone  Injectable 1 milliGRAM(s) IV Push every 4 hours PRN  metoclopramide Injectable 10 milliGRAM(s) IV Push every 6 hours PRN  sodium chloride 0.9% lock flush 10 milliLiter(s) IV Push every 1 hour PRN  sodium chloride 0.9% lock flush 10 milliLiter(s) IV Push every 12 hours PRN    Vital Signs Last 24 Hrs  T(C): 37.4 (27 Oct 2018 05:56), Max: 37.7 (26 Oct 2018 21:00)  T(F): 99.4 (27 Oct 2018 05:56), Max: 99.8 (26 Oct 2018 21:00)  HR: 86 (27 Oct 2018 05:56) (82 - 96)  BP: 117/77 (27 Oct 2018 05:56) (102/69 - 150/85)  BP(mean): --  RR: 18 (27 Oct 2018 05:56) (18 - 18)  SpO2: 98% (27 Oct 2018 05:56) (97% - 100%)    PHYSICAL EXAM  General: adult in NAD  HEENT: clear oropharynx, no erythema, no ulcers  CV: normal S1, S2, RRR  Lungs: clear to auscultation, no wheezes, no rales  Abdomen: soft, nontender, nondistended, normal BS  Ext: no edema  Skin: no rashes  Neuro: alert and oriented x 3  Central line: PICC CDI       LABS:                              7.5    0.9   )-----------( 18       ( 27 Oct 2018 06:54 )             20.1         Mean Cell Volume : 87.6 fl  Mean Cell Hemoglobin : 32.6 pg  Mean Cell Hemoglobin Concentration : 37.2 gm/dL  Auto Neutrophil # : 0.2 K/uL  Auto Lymphocyte # : 0.7 K/uL  Auto Monocyte # : 0.1 K/uL  Auto Eosinophil # : 0.0 K/uL  Auto Basophil # : 0.0 K/uL  Auto Neutrophil % : 18.3 %  Auto Lymphocyte % : 74.4 %  Auto Monocyte % : 6.9 %  Auto Eosinophil % : 0.4 %  Auto Basophil % : 0.0 %      10-27    136  |  101  |  15  ----------------------------<  96  4.6   |  24  |  0.60    Ca    10.7<H>      27 Oct 2018 06:53  Phos  2.8     10-27  Mg     2.3     10-27    TPro  6.8  /  Alb  3.8  /  TBili  0.5  /  DBili  x   /  AST  12  /  ALT  17  /  AlkPhos  167<H>  10-27      Mg 2.3  Phos 2.8            Uric Acid 1.7        RADIOLOGY & ADDITIONAL STUDIES:    < from: CT Head No Cont (10.23.18 @ 16:30) >    IMPRESSION: No change in posterior interhemispheric subdural hemorrhage   and hemorrhage extending along the left tentorial leaf compared with 8:44   AM. No hydrocephalus. Mild cerebellar atrophy. Diagnosis: Relapsed ALL Ph (+)     Protocol/Chemo Regimen: S/p Cycle 1 Inotuzamab Day 1, 8 and 15    Day: 20    Pt endorsed:  unchanged headaches - controlled with pain regimen     Review of Systems: Denies nausea, vomiting, diarrhea, chest pain, SOB      Pain scale: 4-5 now     Diet: Regular    Allergies: No Known Drug Allergies, shellfish (Nausea; Vomiting)    ANTIMICROBIALS  meropenem  IVPB 1000 milliGRAM(s) IV Intermittent every 8 hours  micafungin IVPB 100 milliGRAM(s) IV Intermittent daily    STANDING MEDICATIONS  influenza   Vaccine 0.5 milliLiter(s) IntraMuscular once  levETIRAcetam 500 milliGRAM(s) Oral two times a day  multivitamin 1 Tablet(s) Oral daily  pantoprazole    Tablet 40 milliGRAM(s) Oral before breakfast  potassium phosphate IVPB 15 milliMole(s) IV Intermittent once  sodium chloride 0.9% lock flush 20 milliLiter(s) IV Push once  sodium chloride 0.9%. 1000 milliLiter(s) IV Continuous <Continuous>    PRN MEDICATIONS  acetaminophen   Tablet .. 650 milliGRAM(s) Oral every 6 hours PRN  acetaminophen   Tablet .. 650 milliGRAM(s) Oral every 6 hours PRN  HYDROmorphone  Injectable 0.5 milliGRAM(s) IV Push every 2 hours PRN  HYDROmorphone  Injectable 1 milliGRAM(s) IV Push every 4 hours PRN  metoclopramide Injectable 10 milliGRAM(s) IV Push every 6 hours PRN  sodium chloride 0.9% lock flush 10 milliLiter(s) IV Push every 1 hour PRN  sodium chloride 0.9% lock flush 10 milliLiter(s) IV Push every 12 hours PRN    Vital Signs Last 24 Hrs  T(C): 37.4 (27 Oct 2018 05:56), Max: 37.7 (26 Oct 2018 21:00)  T(F): 99.4 (27 Oct 2018 05:56), Max: 99.8 (26 Oct 2018 21:00)  HR: 86 (27 Oct 2018 05:56) (82 - 96)  BP: 117/77 (27 Oct 2018 05:56) (102/69 - 150/85)  BP(mean): --  RR: 18 (27 Oct 2018 05:56) (18 - 18)  SpO2: 98% (27 Oct 2018 05:56) (97% - 100%)    PHYSICAL EXAM  General: adult in NAD  HEENT: clear oropharynx, no erythema, no ulcers  CV: normal S1, S2, S4,  RRR  Lungs: clear to auscultation, no wheezes, no rales  Abdomen: soft, nontender, nondistended, normal BS  Ext: no edema  Skin: no rashes  Neuro: alert and oriented x 3  Central line: PICC CDI       LABS:                              7.5    0.9   )-----------( 18       ( 27 Oct 2018 06:54 )             20.1         Mean Cell Volume : 87.6 fl  Mean Cell Hemoglobin : 32.6 pg  Mean Cell Hemoglobin Concentration : 37.2 gm/dL  Auto Neutrophil # : 0.2 K/uL  Auto Lymphocyte # : 0.7 K/uL  Auto Monocyte # : 0.1 K/uL  Auto Eosinophil # : 0.0 K/uL  Auto Basophil # : 0.0 K/uL  Auto Neutrophil % : 18.3 %  Auto Lymphocyte % : 74.4 %  Auto Monocyte % : 6.9 %  Auto Eosinophil % : 0.4 %  Auto Basophil % : 0.0 %      10-27    136  |  101  |  15  ----------------------------<  96  4.6   |  24  |  0.60    Ca    10.7<H>      27 Oct 2018 06:53  Phos  2.8     10-27  Mg     2.3     10-27    TPro  6.8  /  Alb  3.8  /  TBili  0.5  /  DBili  x   /  AST  12  /  ALT  17  /  AlkPhos  167<H>  10-27      Mg 2.3  Phos 2.8            Uric Acid 1.7        RADIOLOGY & ADDITIONAL STUDIES:    < from: CT Head No Cont (10.23.18 @ 16:30) >    IMPRESSION: No change in posterior interhemispheric subdural hemorrhage   and hemorrhage extending along the left tentorial leaf compared with 8:44   AM. No hydrocephalus. Mild cerebellar atrophy.

## 2018-10-28 LAB
ALBUMIN SERPL ELPH-MCNC: 3.7 G/DL — SIGNIFICANT CHANGE UP (ref 3.3–5)
ALP SERPL-CCNC: 165 U/L — HIGH (ref 40–120)
ALT FLD-CCNC: 15 U/L — SIGNIFICANT CHANGE UP (ref 10–45)
ANION GAP SERPL CALC-SCNC: 11 MMOL/L — SIGNIFICANT CHANGE UP (ref 5–17)
AST SERPL-CCNC: 11 U/L — SIGNIFICANT CHANGE UP (ref 10–40)
BASOPHILS # BLD AUTO: 0 K/UL — SIGNIFICANT CHANGE UP (ref 0–0.2)
BASOPHILS NFR BLD AUTO: 1 % — SIGNIFICANT CHANGE UP (ref 0–2)
BILIRUB SERPL-MCNC: 0.5 MG/DL — SIGNIFICANT CHANGE UP (ref 0.2–1.2)
BUN SERPL-MCNC: 15 MG/DL — SIGNIFICANT CHANGE UP (ref 7–23)
CALCIUM SERPL-MCNC: 10.6 MG/DL — HIGH (ref 8.4–10.5)
CHLORIDE SERPL-SCNC: 99 MMOL/L — SIGNIFICANT CHANGE UP (ref 96–108)
CO2 SERPL-SCNC: 27 MMOL/L — SIGNIFICANT CHANGE UP (ref 22–31)
CREAT SERPL-MCNC: 0.6 MG/DL — SIGNIFICANT CHANGE UP (ref 0.5–1.3)
EOSINOPHIL # BLD AUTO: 0 K/UL — SIGNIFICANT CHANGE UP (ref 0–0.5)
EOSINOPHIL NFR BLD AUTO: 0.7 % — SIGNIFICANT CHANGE UP (ref 0–6)
GLUCOSE SERPL-MCNC: 97 MG/DL — SIGNIFICANT CHANGE UP (ref 70–99)
HCT VFR BLD CALC: 20 % — CRITICAL LOW (ref 34.5–45)
HGB BLD-MCNC: 7.3 G/DL — LOW (ref 11.5–15.5)
LDH SERPL L TO P-CCNC: 194 U/L — SIGNIFICANT CHANGE UP (ref 50–242)
LYMPHOCYTES # BLD AUTO: 0.7 K/UL — LOW (ref 1–3.3)
LYMPHOCYTES # BLD AUTO: 70 % — HIGH (ref 13–44)
MAGNESIUM SERPL-MCNC: 2.3 MG/DL — SIGNIFICANT CHANGE UP (ref 1.6–2.6)
MCHC RBC-ENTMCNC: 32.2 PG — SIGNIFICANT CHANGE UP (ref 27–34)
MCHC RBC-ENTMCNC: 36.7 GM/DL — HIGH (ref 32–36)
MCV RBC AUTO: 87.7 FL — SIGNIFICANT CHANGE UP (ref 80–100)
MONOCYTES # BLD AUTO: 0.1 K/UL — SIGNIFICANT CHANGE UP (ref 0–0.9)
MONOCYTES NFR BLD AUTO: 10.2 % — SIGNIFICANT CHANGE UP (ref 2–14)
NEUTROPHILS # BLD AUTO: 0.2 K/UL — LOW (ref 1.8–7.4)
NEUTROPHILS NFR BLD AUTO: 18.1 % — LOW (ref 43–77)
PHOSPHATE SERPL-MCNC: 2.9 MG/DL — SIGNIFICANT CHANGE UP (ref 2.5–4.5)
PLATELET # BLD AUTO: 24 K/UL — LOW (ref 150–400)
POTASSIUM SERPL-MCNC: 4.3 MMOL/L — SIGNIFICANT CHANGE UP (ref 3.5–5.3)
POTASSIUM SERPL-SCNC: 4.3 MMOL/L — SIGNIFICANT CHANGE UP (ref 3.5–5.3)
PROT SERPL-MCNC: 7 G/DL — SIGNIFICANT CHANGE UP (ref 6–8.3)
RBC # BLD: 2.28 M/UL — LOW (ref 3.8–5.2)
RBC # FLD: 14.8 % — HIGH (ref 10.3–14.5)
SODIUM SERPL-SCNC: 137 MMOL/L — SIGNIFICANT CHANGE UP (ref 135–145)
URATE SERPL-MCNC: 2.1 MG/DL — LOW (ref 2.5–7)
WBC # BLD: 1 K/UL — CRITICAL LOW (ref 3.8–10.5)
WBC # FLD AUTO: 1 K/UL — CRITICAL LOW (ref 3.8–10.5)

## 2018-10-28 PROCEDURE — 99232 SBSQ HOSP IP/OBS MODERATE 35: CPT | Mod: GC

## 2018-10-28 RX ADMIN — PANTOPRAZOLE SODIUM 40 MILLIGRAM(S): 20 TABLET, DELAYED RELEASE ORAL at 05:58

## 2018-10-28 RX ADMIN — HYDROMORPHONE HYDROCHLORIDE 1 MILLIGRAM(S): 2 INJECTION INTRAMUSCULAR; INTRAVENOUS; SUBCUTANEOUS at 21:05

## 2018-10-28 RX ADMIN — Medication 1 TABLET(S): at 11:50

## 2018-10-28 RX ADMIN — MEROPENEM 200 MILLIGRAM(S): 1 INJECTION INTRAVENOUS at 13:00

## 2018-10-28 RX ADMIN — LEVETIRACETAM 500 MILLIGRAM(S): 250 TABLET, FILM COATED ORAL at 05:58

## 2018-10-28 RX ADMIN — HYDROMORPHONE HYDROCHLORIDE 1 MILLIGRAM(S): 2 INJECTION INTRAMUSCULAR; INTRAVENOUS; SUBCUTANEOUS at 21:35

## 2018-10-28 RX ADMIN — HYDROMORPHONE HYDROCHLORIDE 1 MILLIGRAM(S): 2 INJECTION INTRAMUSCULAR; INTRAVENOUS; SUBCUTANEOUS at 00:00

## 2018-10-28 RX ADMIN — SODIUM CHLORIDE 50 MILLILITER(S): 9 INJECTION INTRAMUSCULAR; INTRAVENOUS; SUBCUTANEOUS at 05:57

## 2018-10-28 RX ADMIN — HYDROMORPHONE HYDROCHLORIDE 1 MILLIGRAM(S): 2 INJECTION INTRAMUSCULAR; INTRAVENOUS; SUBCUTANEOUS at 13:15

## 2018-10-28 RX ADMIN — HYDROMORPHONE HYDROCHLORIDE 1 MILLIGRAM(S): 2 INJECTION INTRAMUSCULAR; INTRAVENOUS; SUBCUTANEOUS at 08:57

## 2018-10-28 RX ADMIN — HYDROMORPHONE HYDROCHLORIDE 1 MILLIGRAM(S): 2 INJECTION INTRAMUSCULAR; INTRAVENOUS; SUBCUTANEOUS at 17:02

## 2018-10-28 RX ADMIN — LEVETIRACETAM 500 MILLIGRAM(S): 250 TABLET, FILM COATED ORAL at 17:04

## 2018-10-28 RX ADMIN — HYDROMORPHONE HYDROCHLORIDE 1 MILLIGRAM(S): 2 INJECTION INTRAMUSCULAR; INTRAVENOUS; SUBCUTANEOUS at 03:49

## 2018-10-28 RX ADMIN — HYDROMORPHONE HYDROCHLORIDE 1 MILLIGRAM(S): 2 INJECTION INTRAMUSCULAR; INTRAVENOUS; SUBCUTANEOUS at 09:14

## 2018-10-28 RX ADMIN — HYDROMORPHONE HYDROCHLORIDE 1 MILLIGRAM(S): 2 INJECTION INTRAMUSCULAR; INTRAVENOUS; SUBCUTANEOUS at 17:17

## 2018-10-28 RX ADMIN — HYDROMORPHONE HYDROCHLORIDE 1 MILLIGRAM(S): 2 INJECTION INTRAMUSCULAR; INTRAVENOUS; SUBCUTANEOUS at 04:04

## 2018-10-28 RX ADMIN — HYDROMORPHONE HYDROCHLORIDE 1 MILLIGRAM(S): 2 INJECTION INTRAMUSCULAR; INTRAVENOUS; SUBCUTANEOUS at 13:00

## 2018-10-28 RX ADMIN — SODIUM CHLORIDE 50 MILLILITER(S): 9 INJECTION INTRAMUSCULAR; INTRAVENOUS; SUBCUTANEOUS at 20:34

## 2018-10-28 RX ADMIN — MEROPENEM 200 MILLIGRAM(S): 1 INJECTION INTRAVENOUS at 21:25

## 2018-10-28 RX ADMIN — MEROPENEM 200 MILLIGRAM(S): 1 INJECTION INTRAVENOUS at 05:58

## 2018-10-28 RX ADMIN — MICAFUNGIN SODIUM 105 MILLIGRAM(S): 100 INJECTION, POWDER, LYOPHILIZED, FOR SOLUTION INTRAVENOUS at 11:50

## 2018-10-28 NOTE — PROGRESS NOTE ADULT - ATTENDING COMMENTS
58F Ph(+) ALL s/p R HyperCVAD x 4 cycles IT MTX x2 s/p stem cell collection w/ Step 1(HD vp16 plus bernardino-c) regimen.  s/p Masha-Auto on 12/16/16. Pt was on Sprycel maintenance. 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 Has completed first cycle Inotuzumab. Headaches improved. No accompanying symptoms.  Cont Keppra prophylaxis.  Repeat head CT shows small re-bleed. Follow up repeat CT stable.  Neutrophil percentage rising.     -GI bleed has resolved, Continue po protonix.  -Afebrile today. BCx/UCx negative 10/13, 10/16.  CT c/a/p 10/16- Scattered and patchy ground glass opacities in the right upper and lower lobes, possibly infection.  Heterogeneous thickening of the endometrial cavity.  Continue vancomycin, meropenem, Mycamine.    - goal plt ct >80K, but plt are refractory- transfuse 1/2 units over 3 hours twice a day. Keep hb >7. Obtain HLA matched platelets as possible.  - OOB, ambulation. 58F Ph(+) ALL s/p R HyperCVAD x 4 cycles IT MTX x2 s/p stem cell collection w/ Step 1(HD vp16 plus bernardino-c) regimen.  s/p Masha-Auto on 12/16/16. Pt was on Sprycel maintenance. 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 Has completed first cycle Inotuzumab. Headaches improved but still present. No accompanying symptoms.  Cont Keppra prophylaxis.  Repeat head CT shows small re-bleed. Follow up repeat CT stable.  Neutrophil percentage rising.     -GI bleed has resolved, Continue po protonix.  -Afebrile today. Blood Cx/Urine Cx negative 10/13, 10/16.  CT c/a/p 10/16- Scattered and patchy ground glass opacities in the right upper and lower lobes, possibly infection.  Heterogeneous thickening of the endometrial cavity.  Continue vancomycin, meropenem, Mycamine.    - goal plt ct >80K, but platelets are refractory to transfusion- transfuse 1/2 units over 3 hours twice a day. Keep hb >7. Obtain HLA matched platelets as possible.  - OOB, ambulation.

## 2018-10-28 NOTE — PROGRESS NOTE ADULT - ASSESSMENT
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 Patient is now s/p Cycle 1 Inotuzumab on days 1, 8, 15. The patient's hospital course has been complicated by SDH, subconjunctival hemorrhage, neutropenic fevers, transaminitis and upper GIB. The patient has pancytopenia 2/2 disease and/or chemotherapy.

## 2018-10-28 NOTE — PROGRESS NOTE ADULT - SUBJECTIVE AND OBJECTIVE BOX
Diagnosis: Relapsed ALL Ph (+)     Protocol/Chemo Regimen: S/p Cycle 1 Inotuzamab Day 1, 8 and 15    Day: 21    Pt endorsed:  unchanged headaches - controlled with pain regimen     Review of Systems: Denies nausea, vomiting, diarrhea, chest pain, SOB      Pain scale: 4-5 now     Diet: Regular    Allergies: No Known Drug Allergies, shellfish (Nausea; Vomiting)    ANTIMICROBIALS  meropenem  IVPB 1000 milliGRAM(s) IV Intermittent every 8 hours  micafungin IVPB 100 milliGRAM(s) IV Intermittent daily    STANDING MEDICATIONS  influenza   Vaccine 0.5 milliLiter(s) IntraMuscular once  levETIRAcetam 500 milliGRAM(s) Oral two times a day  multivitamin 1 Tablet(s) Oral daily  pantoprazole    Tablet 40 milliGRAM(s) Oral before breakfast  potassium phosphate IVPB 15 milliMole(s) IV Intermittent once  sodium chloride 0.9% lock flush 20 milliLiter(s) IV Push once  sodium chloride 0.9%. 1000 milliLiter(s) IV Continuous <Continuous>    PRN MEDICATIONS  acetaminophen   Tablet .. 650 milliGRAM(s) Oral every 6 hours PRN  acetaminophen   Tablet .. 650 milliGRAM(s) Oral every 6 hours PRN  HYDROmorphone  Injectable 0.5 milliGRAM(s) IV Push every 2 hours PRN  HYDROmorphone  Injectable 1 milliGRAM(s) IV Push every 4 hours PRN  metoclopramide Injectable 10 milliGRAM(s) IV Push every 6 hours PRN  sodium chloride 0.9% lock flush 10 milliLiter(s) IV Push every 1 hour PRN  sodium chloride 0.9% lock flush 10 milliLiter(s) IV Push every 12 hours PRN    Vital Signs Last 24 Hrs  T(C): 37 (28 Oct 2018 10:00), Max: 37.8 (27 Oct 2018 20:58)  T(F): 98.6 (28 Oct 2018 10:00), Max: 100 (27 Oct 2018 20:58)  HR: 86 (28 Oct 2018 10:00) (78 - 93)  BP: 137/85 (28 Oct 2018 10:00) (116/77 - 153/91)  BP(mean): --  RR: 18 (28 Oct 2018 10:00) (16 - 18)  SpO2: 100% (28 Oct 2018 10:00) (98% - 100%)      PHYSICAL EXAM  General: adult in NAD  HEENT: clear oropharynx, no erythema, no ulcers  CV: normal S1, S2, S4,  RRR  Lungs: clear to auscultation, no wheezes, no rales  Abdomen: soft, nontender, nondistended, normal BS  Ext: trace edema B/L LE   Skin: no rashes  Neuro: alert and oriented x 3  Central line: PICC CDI     LABS:                               7.3    1.0   )-----------( 24       ( 28 Oct 2018 07:11 )             20.0         Mean Cell Volume : 87.7 fl  Mean Cell Hemoglobin : 32.2 pg  Mean Cell Hemoglobin Concentration : 36.7 gm/dL  Auto Neutrophil # : 0.2 K/uL  Auto Lymphocyte # : 0.7 K/uL  Auto Monocyte # : 0.1 K/uL  Auto Eosinophil # : 0.0 K/uL  Auto Basophil # : 0.0 K/uL  Auto Neutrophil % : 18.1 %  Auto Lymphocyte % : 70.0 %  Auto Monocyte % : 10.2 %  Auto Eosinophil % : 0.7 %  Auto Basophil % : 1.0 %      10-28    137  |  99  |  15  ----------------------------<  97  4.3   |  27  |  0.60    Ca    10.6<H>      28 Oct 2018 07:11  Phos  2.9     10-28  Mg     2.3     10-28    TPro  7.0  /  Alb  3.7  /  TBili  0.5  /  DBili  x   /  AST  11  /  ALT  15  /  AlkPhos  165<H>  10-28      Mg 2.3  Phos 2.9            Uric Acid 2.1      RADIOLOGY & ADDITIONAL STUDIES:    < from: CT Head No Cont (10.23.18 @ 16:30) >    IMPRESSION: No change in posterior interhemispheric subdural hemorrhage   and hemorrhage extending along the left tentorial leaf compared with 8:44   AM. No hydrocephalus. Mild cerebellar atrophy. Diagnosis: Relapsed ALL Ph (+)     Protocol/Chemo Regimen: S/p Cycle 1 Inotuzamab Day 1, 8 and 15    Day: 21    Pt endorsed:  unchanged headaches - controlled with pain regimen     Review of Systems: Denies nausea, vomiting, diarrhea, chest pain, SOB      Pain scale: 4-5 now     Diet: Regular    Allergies: No Known Drug Allergies, shellfish (Nausea; Vomiting)    ANTIMICROBIALS  meropenem  IVPB 1000 milliGRAM(s) IV Intermittent every 8 hours  micafungin IVPB 100 milliGRAM(s) IV Intermittent daily    STANDING MEDICATIONS  influenza   Vaccine 0.5 milliLiter(s) IntraMuscular once  levETIRAcetam 500 milliGRAM(s) Oral two times a day  multivitamin 1 Tablet(s) Oral daily  pantoprazole    Tablet 40 milliGRAM(s) Oral before breakfast  potassium phosphate IVPB 15 milliMole(s) IV Intermittent once  sodium chloride 0.9% lock flush 20 milliLiter(s) IV Push once  sodium chloride 0.9%. 1000 milliLiter(s) IV Continuous <Continuous>    PRN MEDICATIONS  acetaminophen   Tablet .. 650 milliGRAM(s) Oral every 6 hours PRN  acetaminophen   Tablet .. 650 milliGRAM(s) Oral every 6 hours PRN  HYDROmorphone  Injectable 0.5 milliGRAM(s) IV Push every 2 hours PRN  HYDROmorphone  Injectable 1 milliGRAM(s) IV Push every 4 hours PRN  metoclopramide Injectable 10 milliGRAM(s) IV Push every 6 hours PRN  sodium chloride 0.9% lock flush 10 milliLiter(s) IV Push every 1 hour PRN  sodium chloride 0.9% lock flush 10 milliLiter(s) IV Push every 12 hours PRN    Vital Signs Last 24 Hrs  T(C): 37 (28 Oct 2018 10:00), Max: 37.8 (27 Oct 2018 20:58)  T(F): 98.6 (28 Oct 2018 10:00), Max: 100 (27 Oct 2018 20:58)  HR: 86 (28 Oct 2018 10:00) (78 - 93)  BP: 137/85 (28 Oct 2018 10:00) (116/77 - 153/91)  BP(mean): --  RR: 18 (28 Oct 2018 10:00) (16 - 18)  SpO2: 100% (28 Oct 2018 10:00) (98% - 100%)      PHYSICAL EXAM  General: adult in NAD  HEENT: clear oropharynx, no erythema, no ulcers, right side of head  Ommaya in place   CV: normal S1, S2, S4,  RRR  Lungs: clear to auscultation, no wheezes, no rales  Abdomen: soft, nontender, nondistended, normal BS  Ext: trace edema B/L LE   Skin: no rashes  Neuro: alert and oriented x 3  Central line: PICC CDI     LABS:                               7.3    1.0   )-----------( 24       ( 28 Oct 2018 07:11 )             20.0         Mean Cell Volume : 87.7 fl  Mean Cell Hemoglobin : 32.2 pg  Mean Cell Hemoglobin Concentration : 36.7 gm/dL  Auto Neutrophil # : 0.2 K/uL  Auto Lymphocyte # : 0.7 K/uL  Auto Monocyte # : 0.1 K/uL  Auto Eosinophil # : 0.0 K/uL  Auto Basophil # : 0.0 K/uL  Auto Neutrophil % : 18.1 %  Auto Lymphocyte % : 70.0 %  Auto Monocyte % : 10.2 %  Auto Eosinophil % : 0.7 %  Auto Basophil % : 1.0 %      10-28    137  |  99  |  15  ----------------------------<  97  4.3   |  27  |  0.60    Ca    10.6<H>      28 Oct 2018 07:11  Phos  2.9     10-28  Mg     2.3     10-28    TPro  7.0  /  Alb  3.7  /  TBili  0.5  /  DBili  x   /  AST  11  /  ALT  15  /  AlkPhos  165<H>  10-28      Mg 2.3  Phos 2.9            Uric Acid 2.1      RADIOLOGY & ADDITIONAL STUDIES:     CT Head No Cont (10.23.18 @ 16:30)    IMPRESSION: No change in posterior interhemispheric subdural hemorrhage   and hemorrhage extending along the left tentorial leaf compared with 8:44   AM. No hydrocephalus. Mild cerebellar atrophy.

## 2018-10-29 LAB
ALBUMIN SERPL ELPH-MCNC: 3.6 G/DL — SIGNIFICANT CHANGE UP (ref 3.3–5)
ALP SERPL-CCNC: 159 U/L — HIGH (ref 40–120)
ALT FLD-CCNC: 15 U/L — SIGNIFICANT CHANGE UP (ref 10–45)
ANION GAP SERPL CALC-SCNC: 8 MMOL/L — SIGNIFICANT CHANGE UP (ref 5–17)
ANTIBODY ID 1_1: SIGNIFICANT CHANGE UP
APTT BLD: 28.1 SEC — SIGNIFICANT CHANGE UP (ref 27.5–37.4)
AST SERPL-CCNC: 13 U/L — SIGNIFICANT CHANGE UP (ref 10–40)
BASOPHILS # BLD AUTO: 0 K/UL — SIGNIFICANT CHANGE UP (ref 0–0.2)
BASOPHILS NFR BLD AUTO: 0.3 % — SIGNIFICANT CHANGE UP (ref 0–2)
BILIRUB SERPL-MCNC: 0.5 MG/DL — SIGNIFICANT CHANGE UP (ref 0.2–1.2)
BLD GP AB SCN SERPL QL: POSITIVE — SIGNIFICANT CHANGE UP
BUN SERPL-MCNC: 14 MG/DL — SIGNIFICANT CHANGE UP (ref 7–23)
CALCIUM SERPL-MCNC: 10.1 MG/DL — SIGNIFICANT CHANGE UP (ref 8.4–10.5)
CHLORIDE SERPL-SCNC: 104 MMOL/L — SIGNIFICANT CHANGE UP (ref 96–108)
CHROM ANALY OVERALL INTERP SPEC-IMP: SIGNIFICANT CHANGE UP
CO2 SERPL-SCNC: 26 MMOL/L — SIGNIFICANT CHANGE UP (ref 22–31)
CREAT SERPL-MCNC: 0.58 MG/DL — SIGNIFICANT CHANGE UP (ref 0.5–1.3)
DAT C3-SP REAG RBC QL: POSITIVE — SIGNIFICANT CHANGE UP
DIRECT COOMBS IGG: POSITIVE — SIGNIFICANT CHANGE UP
ELUATE ANTIBODY 1: SIGNIFICANT CHANGE UP
EOSINOPHIL # BLD AUTO: 0 K/UL — SIGNIFICANT CHANGE UP (ref 0–0.5)
EOSINOPHIL NFR BLD AUTO: 1 % — SIGNIFICANT CHANGE UP (ref 0–6)
FIBRINOGEN PPP-MCNC: 652 MG/DL — HIGH (ref 310–510)
GLUCOSE SERPL-MCNC: 91 MG/DL — SIGNIFICANT CHANGE UP (ref 70–99)
HCT VFR BLD CALC: 19.7 % — CRITICAL LOW (ref 34.5–45)
HGB BLD-MCNC: 6.8 G/DL — CRITICAL LOW (ref 11.5–15.5)
INR BLD: 0.99 RATIO — SIGNIFICANT CHANGE UP (ref 0.88–1.16)
LDH SERPL L TO P-CCNC: 183 U/L — SIGNIFICANT CHANGE UP (ref 50–242)
LYMPHOCYTES # BLD AUTO: 0.7 K/UL — LOW (ref 1–3.3)
LYMPHOCYTES # BLD AUTO: 65.8 % — HIGH (ref 13–44)
MAGNESIUM SERPL-MCNC: 2.2 MG/DL — SIGNIFICANT CHANGE UP (ref 1.6–2.6)
MCHC RBC-ENTMCNC: 30.1 PG — SIGNIFICANT CHANGE UP (ref 27–34)
MCHC RBC-ENTMCNC: 34.2 GM/DL — SIGNIFICANT CHANGE UP (ref 32–36)
MCV RBC AUTO: 87.9 FL — SIGNIFICANT CHANGE UP (ref 80–100)
MONOCYTES # BLD AUTO: 0.1 K/UL — SIGNIFICANT CHANGE UP (ref 0–0.9)
MONOCYTES NFR BLD AUTO: 13.4 % — SIGNIFICANT CHANGE UP (ref 2–14)
NEUTROPHILS # BLD AUTO: 0.2 K/UL — LOW (ref 1.8–7.4)
NEUTROPHILS NFR BLD AUTO: 19.6 % — LOW (ref 43–77)
PHOSPHATE SERPL-MCNC: 2.5 MG/DL — SIGNIFICANT CHANGE UP (ref 2.5–4.5)
PLATELET # BLD AUTO: 18 K/UL — CRITICAL LOW (ref 150–400)
POTASSIUM SERPL-MCNC: 4.4 MMOL/L — SIGNIFICANT CHANGE UP (ref 3.5–5.3)
POTASSIUM SERPL-SCNC: 4.4 MMOL/L — SIGNIFICANT CHANGE UP (ref 3.5–5.3)
PROT SERPL-MCNC: 6.7 G/DL — SIGNIFICANT CHANGE UP (ref 6–8.3)
PROTHROM AB SERPL-ACNC: 10.8 SEC — SIGNIFICANT CHANGE UP (ref 9.8–12.7)
RBC # BLD: 2.25 M/UL — LOW (ref 3.8–5.2)
RBC # FLD: 15 % — HIGH (ref 10.3–14.5)
RH IG SCN BLD-IMP: POSITIVE — SIGNIFICANT CHANGE UP
SODIUM SERPL-SCNC: 138 MMOL/L — SIGNIFICANT CHANGE UP (ref 135–145)
URATE SERPL-MCNC: 2.5 MG/DL — SIGNIFICANT CHANGE UP (ref 2.5–7)
WBC # BLD: 1.1 K/UL — LOW (ref 3.8–10.5)
WBC # FLD AUTO: 1.1 K/UL — LOW (ref 3.8–10.5)

## 2018-10-29 PROCEDURE — 99232 SBSQ HOSP IP/OBS MODERATE 35: CPT

## 2018-10-29 PROCEDURE — 86077 PHYS BLOOD BANK SERV XMATCH: CPT

## 2018-10-29 RX ADMIN — HYDROMORPHONE HYDROCHLORIDE 1 MILLIGRAM(S): 2 INJECTION INTRAMUSCULAR; INTRAVENOUS; SUBCUTANEOUS at 05:40

## 2018-10-29 RX ADMIN — HYDROMORPHONE HYDROCHLORIDE 1 MILLIGRAM(S): 2 INJECTION INTRAMUSCULAR; INTRAVENOUS; SUBCUTANEOUS at 09:10

## 2018-10-29 RX ADMIN — HYDROMORPHONE HYDROCHLORIDE 1 MILLIGRAM(S): 2 INJECTION INTRAMUSCULAR; INTRAVENOUS; SUBCUTANEOUS at 13:30

## 2018-10-29 RX ADMIN — MEROPENEM 200 MILLIGRAM(S): 1 INJECTION INTRAVENOUS at 05:40

## 2018-10-29 RX ADMIN — LEVETIRACETAM 500 MILLIGRAM(S): 250 TABLET, FILM COATED ORAL at 05:40

## 2018-10-29 RX ADMIN — MICAFUNGIN SODIUM 105 MILLIGRAM(S): 100 INJECTION, POWDER, LYOPHILIZED, FOR SOLUTION INTRAVENOUS at 12:24

## 2018-10-29 RX ADMIN — MEROPENEM 200 MILLIGRAM(S): 1 INJECTION INTRAVENOUS at 13:14

## 2018-10-29 RX ADMIN — HYDROMORPHONE HYDROCHLORIDE 0.5 MILLIGRAM(S): 2 INJECTION INTRAMUSCULAR; INTRAVENOUS; SUBCUTANEOUS at 17:51

## 2018-10-29 RX ADMIN — HYDROMORPHONE HYDROCHLORIDE 1 MILLIGRAM(S): 2 INJECTION INTRAMUSCULAR; INTRAVENOUS; SUBCUTANEOUS at 20:11

## 2018-10-29 RX ADMIN — HYDROMORPHONE HYDROCHLORIDE 1 MILLIGRAM(S): 2 INJECTION INTRAMUSCULAR; INTRAVENOUS; SUBCUTANEOUS at 13:10

## 2018-10-29 RX ADMIN — HYDROMORPHONE HYDROCHLORIDE 1 MILLIGRAM(S): 2 INJECTION INTRAMUSCULAR; INTRAVENOUS; SUBCUTANEOUS at 20:41

## 2018-10-29 RX ADMIN — SODIUM CHLORIDE 50 MILLILITER(S): 9 INJECTION INTRAMUSCULAR; INTRAVENOUS; SUBCUTANEOUS at 20:11

## 2018-10-29 RX ADMIN — HYDROMORPHONE HYDROCHLORIDE 1 MILLIGRAM(S): 2 INJECTION INTRAMUSCULAR; INTRAVENOUS; SUBCUTANEOUS at 09:30

## 2018-10-29 RX ADMIN — LEVETIRACETAM 500 MILLIGRAM(S): 250 TABLET, FILM COATED ORAL at 17:33

## 2018-10-29 RX ADMIN — HYDROMORPHONE HYDROCHLORIDE 1 MILLIGRAM(S): 2 INJECTION INTRAMUSCULAR; INTRAVENOUS; SUBCUTANEOUS at 01:08

## 2018-10-29 RX ADMIN — HYDROMORPHONE HYDROCHLORIDE 1 MILLIGRAM(S): 2 INJECTION INTRAMUSCULAR; INTRAVENOUS; SUBCUTANEOUS at 23:57

## 2018-10-29 RX ADMIN — MEROPENEM 200 MILLIGRAM(S): 1 INJECTION INTRAVENOUS at 21:47

## 2018-10-29 RX ADMIN — PANTOPRAZOLE SODIUM 40 MILLIGRAM(S): 20 TABLET, DELAYED RELEASE ORAL at 05:40

## 2018-10-29 RX ADMIN — HYDROMORPHONE HYDROCHLORIDE 1 MILLIGRAM(S): 2 INJECTION INTRAMUSCULAR; INTRAVENOUS; SUBCUTANEOUS at 01:38

## 2018-10-29 RX ADMIN — HYDROMORPHONE HYDROCHLORIDE 1 MILLIGRAM(S): 2 INJECTION INTRAMUSCULAR; INTRAVENOUS; SUBCUTANEOUS at 05:10

## 2018-10-29 RX ADMIN — Medication 1 TABLET(S): at 12:24

## 2018-10-29 RX ADMIN — HYDROMORPHONE HYDROCHLORIDE 0.5 MILLIGRAM(S): 2 INJECTION INTRAMUSCULAR; INTRAVENOUS; SUBCUTANEOUS at 17:31

## 2018-10-29 NOTE — PROGRESS NOTE ADULT - PROBLEM SELECTOR PLAN 1
10/5 Peripheral flow confirms relapse with BCR/ABL rearrangement in 79.5% of cells  Continue Inotuzamab (Day 1, 8 and 15)  For BM bx after second cycle  Monitor CBC/Lytes and transfuse/replete PRN  Thrombocytopenia with SDH: Patient platelet refractory, per blood bank patient to receive 1/2 bags over 3 hours q 12 hours when HLA not available   Strict Is and Os/Daily weights/Mouth Care  Antiemetics, IVF 10/5 Peripheral flow confirms relapse with BCR/ABL rearrangement in 79.5% of cells  Continue Inotuzamab (Day 1, 8 and 15)  For BM bx after second cycle  Monitor CBC/Lytes and transfuse/replete PRN  Thrombocytopenia with SDH: Patient platelet refractory, per blood bank patient to receive 1/2 bags over 3 hours q 12 hours when HLA not available   Strict Is and Os/Daily weights/Mouth Care  Antiemetics, IVF  - Anemia: PRBCs 1 unit today

## 2018-10-29 NOTE — PROGRESS NOTE ADULT - SUBJECTIVE AND OBJECTIVE BOX
Diagnosis: Relapsed ALL Ph (+)     Protocol/Chemo Regimen: S/p Cycle 1 Inotuzamab Day 1, 8 and 15    Day: 22    Pt endorsed: unchanged headaches - controlled with pain regimen     Review of Systems: Denies nausea, vomiting, diarrhea, chest pain, SOB      Pain scale: 4-5 now     Diet: Regular    Allergies: No Known Drug Allergies, shellfish (Nausea; Vomiting)      --------------------- Diagnosis: Relapsed ALL Ph (+)     Protocol/Chemo Regimen: S/p Cycle 1 Inotuzamab Day 1, 8 and 15    Day: 22    Pt endorsed: unchanged headaches - controlled with pain regimen     Review of Systems: Denies nausea, vomiting, diarrhea, chest pain, SOB      Pain scale: 4-5 now     Diet: Regular    Allergies: No Known Drug Allergies, shellfish (Nausea; Vomiting)    ANTIMICROBIALS  meropenem  IVPB 1000 milliGRAM(s) IV Intermittent every 8 hours  micafungin IVPB 100 milliGRAM(s) IV Intermittent daily    STANDING MEDICATIONS  influenza   Vaccine 0.5 milliLiter(s) IntraMuscular once  levETIRAcetam 500 milliGRAM(s) Oral two times a day  multivitamin 1 Tablet(s) Oral daily  pantoprazole    Tablet 40 milliGRAM(s) Oral before breakfast  sodium chloride 0.9% lock flush 20 milliLiter(s) IV Push once  sodium chloride 0.9%. 1000 milliLiter(s) IV Continuous <Continuous>    PRN MEDICATIONS  acetaminophen   Tablet .. 650 milliGRAM(s) Oral every 6 hours PRN  acetaminophen   Tablet .. 650 milliGRAM(s) Oral every 6 hours PRN  HYDROmorphone  Injectable 1 milliGRAM(s) IV Push every 4 hours PRN  HYDROmorphone  Injectable 0.5 milliGRAM(s) IV Push every 6 hours PRN  metoclopramide Injectable 10 milliGRAM(s) IV Push every 6 hours PRN  sodium chloride 0.9% lock flush 10 milliLiter(s) IV Push every 1 hour PRN  sodium chloride 0.9% lock flush 10 milliLiter(s) IV Push every 12 hours PRN    Vital Signs Last 24 Hrs  T(C): 36.6 (29 Oct 2018 05:39), Max: 37.3 (28 Oct 2018 12:45)  T(F): 97.8 (29 Oct 2018 05:39), Max: 99.2 (28 Oct 2018 12:45)  HR: 81 (29 Oct 2018 05:39) (81 - 95)  BP: 126/79 (29 Oct 2018 05:39) (110/78 - 142/84)  BP(mean): --  RR: 18 (29 Oct 2018 05:39) (16 - 18)  SpO2: 100% (29 Oct 2018 05:39) (100% - 100%)    PHYSICAL EXAM  General: adult in NAD  HEENT: clear oropharynx, no erythema, no ulcers  Neck: supple  CV: normal S1, S2, RRR  Lungs: clear to auscultation, no wheezes, no rales  Abdomen: soft, nontender, nondistended, normal BS  Ext: no edema  Skin: no rashes  Neuro: alert and oriented x 3  Central line: normal     LABS:                        6.8    1.1   )-----------( 18       ( 29 Oct 2018 06:54 )             19.7     Mean Cell Volume : 87.9 fl  Mean Cell Hemoglobin : 30.1 pg  Mean Cell Hemoglobin Concentration : 34.2 gm/dL  Auto Neutrophil # : x  Auto Lymphocyte # : x  Auto Monocyte # : x  Auto Eosinophil # : x  Auto Basophil # : x  Auto Neutrophil % : x  Auto Lymphocyte % : x  Auto Monocyte % : x  Auto Eosinophil % : x  Auto Basophil % : x    10-29  138  |  104  |  14  ----------------------------<  91  4.4   |  26  |  0.58    Ca    10.1      29 Oct 2018 06:48  Phos  2.5     10-29  Mg     2.2     10-29    TPro  6.7  /  Alb  3.6  /  TBili  0.5  /  DBili  x   /  AST  13  /  ALT  15  /  AlkPhos  159<H>  10-29    Mg 2.2  Phos 2.5    PT/INR - ( 29 Oct 2018 06:52 )   PT: 10.8 sec;   INR: 0.99 ratio       PTT - ( 29 Oct 2018 06:52 )  PTT:28.1 sec    Uric Acid 2.5    RADIOLOGY & ADDITIONAL STUDIES:    < from: CT Head No Cont (10.23.18 @ 16:30) >  IMPRESSION: No change in posterior interhemispheric subdural hemorrhage   and hemorrhage extending along the left tentorial leaf compared with 8:44   AM. No hydrocephalus. Mild cerebellar atrophy.

## 2018-10-29 NOTE — PROGRESS NOTE ADULT - ATTENDING COMMENTS
58F Ph(+) ALL s/p R HyperCVAD x 4 cycles IT MTX x2 s/p stem cell collection w/ Step 1(HD vp16 plus bernardino-c) regimen.  s/p Masha-Auto on 12/16/16. Pt was on Sprycel maintenance. 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 Has completed first cycle Inotuzumab. Headaches improved but still present. No accompanying symptoms.  Cont Keppra prophylaxis.  Repeat head CT shows small re-bleed. Follow up repeat CT stable.  Neutrophil percentage rising.     -GI bleed has resolved, Continue po protonix.  -Afebrile today. Blood Cx/Urine Cx negative 10/13, 10/16.  CT c/a/p 10/16- Scattered and patchy ground glass opacities in the right upper and lower lobes, possibly infection.  Heterogeneous thickening of the endometrial cavity.  Continue vancomycin, meropenem, Mycamine.    - goal plt ct >80K, but platelets are refractory to transfusion- transfuse 1/2 units over 3 hours twice a day. Keep hb >7. Obtain HLA matched platelets as possible.  - OOB, ambulation. 58F Ph(+) ALL s/p R HyperCVAD x 4 cycles IT MTX x2 s/p stem cell collection w/ Step 1(HD vp16 plus bernardino-c) regimen.  s/p Masha-Auto on 12/16/16. Pt was on Sprycel maintenance. 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 Has completed first cycle Inotuzumab. Headaches improved but still present. No accompanying symptoms.  Cont Keppra prophylaxis.  Repeat head CT showed small re-bleed. Follow up repeat CT stable.  Neutrophil percentage rising.   -GI bleed has resolved, Continue po protonix.  -Afebrile today. Blood Cx/Urine CT c/a/p 10/16- Scattered and patchy ground glass opacities in the right upper and lower lobes, possibly infection.  Heterogeneous thickening of the endometrial cavity.  Continue vancomycin, meropenem, Mycamine.    - goal plt ct >80K, but platelets are refractory to transfusion- transfuse 1/2 units over 3 hours twice a day. Keep hb >7. Obtain HLA matched platelets as possible.  - OOB, ambulation.

## 2018-10-30 LAB
ALBUMIN SERPL ELPH-MCNC: 3.8 G/DL — SIGNIFICANT CHANGE UP (ref 3.3–5)
ALP SERPL-CCNC: 170 U/L — HIGH (ref 40–120)
ALT FLD-CCNC: 15 U/L — SIGNIFICANT CHANGE UP (ref 10–45)
ANION GAP SERPL CALC-SCNC: 8 MMOL/L — SIGNIFICANT CHANGE UP (ref 5–17)
AST SERPL-CCNC: 12 U/L — SIGNIFICANT CHANGE UP (ref 10–40)
BASOPHILS # BLD AUTO: 0 K/UL — SIGNIFICANT CHANGE UP (ref 0–0.2)
BILIRUB SERPL-MCNC: 0.5 MG/DL — SIGNIFICANT CHANGE UP (ref 0.2–1.2)
BUN SERPL-MCNC: 14 MG/DL — SIGNIFICANT CHANGE UP (ref 7–23)
CALCIUM SERPL-MCNC: 10.1 MG/DL — SIGNIFICANT CHANGE UP (ref 8.4–10.5)
CHLORIDE SERPL-SCNC: 104 MMOL/L — SIGNIFICANT CHANGE UP (ref 96–108)
CO2 SERPL-SCNC: 27 MMOL/L — SIGNIFICANT CHANGE UP (ref 22–31)
CREAT SERPL-MCNC: 0.63 MG/DL — SIGNIFICANT CHANGE UP (ref 0.5–1.3)
EOSINOPHIL # BLD AUTO: 0 K/UL — SIGNIFICANT CHANGE UP (ref 0–0.5)
GLUCOSE SERPL-MCNC: 87 MG/DL — SIGNIFICANT CHANGE UP (ref 70–99)
HCT VFR BLD CALC: 23.3 % — LOW (ref 34.5–45)
HGB BLD-MCNC: 7.9 G/DL — LOW (ref 11.5–15.5)
LDH SERPL L TO P-CCNC: 196 U/L — SIGNIFICANT CHANGE UP (ref 50–242)
LYMPHOCYTES # BLD AUTO: 1.2 K/UL — SIGNIFICANT CHANGE UP (ref 1–3.3)
LYMPHOCYTES # BLD AUTO: 60 % — HIGH (ref 13–44)
MAGNESIUM SERPL-MCNC: 2.3 MG/DL — SIGNIFICANT CHANGE UP (ref 1.6–2.6)
MCHC RBC-ENTMCNC: 30 PG — SIGNIFICANT CHANGE UP (ref 27–34)
MCHC RBC-ENTMCNC: 33.8 GM/DL — SIGNIFICANT CHANGE UP (ref 32–36)
MCV RBC AUTO: 88.8 FL — SIGNIFICANT CHANGE UP (ref 80–100)
MONOCYTES # BLD AUTO: 0 K/UL — SIGNIFICANT CHANGE UP (ref 0–0.9)
MONOCYTES NFR BLD AUTO: 17 % — HIGH (ref 2–14)
NEUTROPHILS # BLD AUTO: 0.5 K/UL — LOW (ref 1.8–7.4)
NEUTROPHILS NFR BLD AUTO: 17 % — LOW (ref 43–77)
PHOSPHATE SERPL-MCNC: 2.7 MG/DL — SIGNIFICANT CHANGE UP (ref 2.5–4.5)
PLATELET # BLD AUTO: 36 K/UL — LOW (ref 150–400)
PLATELET # BLD AUTO: 43 K/UL — LOW (ref 150–400)
POTASSIUM SERPL-MCNC: 4.3 MMOL/L — SIGNIFICANT CHANGE UP (ref 3.5–5.3)
POTASSIUM SERPL-SCNC: 4.3 MMOL/L — SIGNIFICANT CHANGE UP (ref 3.5–5.3)
PROT SERPL-MCNC: 6.9 G/DL — SIGNIFICANT CHANGE UP (ref 6–8.3)
RBC # BLD: 2.62 M/UL — LOW (ref 3.8–5.2)
RBC # FLD: 14.5 % — SIGNIFICANT CHANGE UP (ref 10.3–14.5)
SODIUM SERPL-SCNC: 139 MMOL/L — SIGNIFICANT CHANGE UP (ref 135–145)
WBC # BLD: 1.7 K/UL — LOW (ref 3.8–10.5)
WBC # FLD AUTO: 1.7 K/UL — LOW (ref 3.8–10.5)

## 2018-10-30 PROCEDURE — 99232 SBSQ HOSP IP/OBS MODERATE 35: CPT

## 2018-10-30 RX ORDER — PETROLATUM,WHITE
1 JELLY (GRAM) TOPICAL
Qty: 0 | Refills: 0 | Status: DISCONTINUED | OUTPATIENT
Start: 2018-10-30 | End: 2018-11-20

## 2018-10-30 RX ADMIN — MEROPENEM 200 MILLIGRAM(S): 1 INJECTION INTRAVENOUS at 05:12

## 2018-10-30 RX ADMIN — HYDROMORPHONE HYDROCHLORIDE 1 MILLIGRAM(S): 2 INJECTION INTRAMUSCULAR; INTRAVENOUS; SUBCUTANEOUS at 00:27

## 2018-10-30 RX ADMIN — Medication 1 TABLET(S): at 11:52

## 2018-10-30 RX ADMIN — HYDROMORPHONE HYDROCHLORIDE 0.5 MILLIGRAM(S): 2 INJECTION INTRAMUSCULAR; INTRAVENOUS; SUBCUTANEOUS at 09:06

## 2018-10-30 RX ADMIN — MEROPENEM 200 MILLIGRAM(S): 1 INJECTION INTRAVENOUS at 14:55

## 2018-10-30 RX ADMIN — HYDROMORPHONE HYDROCHLORIDE 1 MILLIGRAM(S): 2 INJECTION INTRAMUSCULAR; INTRAVENOUS; SUBCUTANEOUS at 11:51

## 2018-10-30 RX ADMIN — HYDROMORPHONE HYDROCHLORIDE 1 MILLIGRAM(S): 2 INJECTION INTRAMUSCULAR; INTRAVENOUS; SUBCUTANEOUS at 12:15

## 2018-10-30 RX ADMIN — Medication 1 APPLICATION(S): at 17:11

## 2018-10-30 RX ADMIN — MEROPENEM 200 MILLIGRAM(S): 1 INJECTION INTRAVENOUS at 22:03

## 2018-10-30 RX ADMIN — HYDROMORPHONE HYDROCHLORIDE 1 MILLIGRAM(S): 2 INJECTION INTRAMUSCULAR; INTRAVENOUS; SUBCUTANEOUS at 05:11

## 2018-10-30 RX ADMIN — LEVETIRACETAM 500 MILLIGRAM(S): 250 TABLET, FILM COATED ORAL at 17:11

## 2018-10-30 RX ADMIN — HYDROMORPHONE HYDROCHLORIDE 1 MILLIGRAM(S): 2 INJECTION INTRAMUSCULAR; INTRAVENOUS; SUBCUTANEOUS at 16:07

## 2018-10-30 RX ADMIN — PANTOPRAZOLE SODIUM 40 MILLIGRAM(S): 20 TABLET, DELAYED RELEASE ORAL at 05:14

## 2018-10-30 RX ADMIN — SODIUM CHLORIDE 50 MILLILITER(S): 9 INJECTION INTRAMUSCULAR; INTRAVENOUS; SUBCUTANEOUS at 20:24

## 2018-10-30 RX ADMIN — HYDROMORPHONE HYDROCHLORIDE 0.5 MILLIGRAM(S): 2 INJECTION INTRAMUSCULAR; INTRAVENOUS; SUBCUTANEOUS at 08:36

## 2018-10-30 RX ADMIN — HYDROMORPHONE HYDROCHLORIDE 1 MILLIGRAM(S): 2 INJECTION INTRAMUSCULAR; INTRAVENOUS; SUBCUTANEOUS at 05:41

## 2018-10-30 RX ADMIN — HYDROMORPHONE HYDROCHLORIDE 1 MILLIGRAM(S): 2 INJECTION INTRAMUSCULAR; INTRAVENOUS; SUBCUTANEOUS at 20:08

## 2018-10-30 RX ADMIN — LEVETIRACETAM 500 MILLIGRAM(S): 250 TABLET, FILM COATED ORAL at 05:14

## 2018-10-30 RX ADMIN — HYDROMORPHONE HYDROCHLORIDE 1 MILLIGRAM(S): 2 INJECTION INTRAMUSCULAR; INTRAVENOUS; SUBCUTANEOUS at 20:38

## 2018-10-30 RX ADMIN — SODIUM CHLORIDE 50 MILLILITER(S): 9 INJECTION INTRAMUSCULAR; INTRAVENOUS; SUBCUTANEOUS at 19:51

## 2018-10-30 RX ADMIN — HYDROMORPHONE HYDROCHLORIDE 1 MILLIGRAM(S): 2 INJECTION INTRAMUSCULAR; INTRAVENOUS; SUBCUTANEOUS at 16:24

## 2018-10-30 RX ADMIN — MICAFUNGIN SODIUM 105 MILLIGRAM(S): 100 INJECTION, POWDER, LYOPHILIZED, FOR SOLUTION INTRAVENOUS at 11:26

## 2018-10-30 NOTE — CHART NOTE - NSCHARTNOTEFT_GEN_A_CORE
Pt seen for nutrition follow up. Pt with relapsed ALL S/P cycle 1 inotuzumab.    Source: Patient [x ]    Family [ ]     other [ ]    Diet : Regular diet      Patient denies recent N+V, last BM this morning     PO intake:  Pt reports appetite is much improved over the past 2 weeks from before however pt continues to eat suboptimally, pt will take boiled eggs for breakfast and soups for lunch and dinner supplemented by foods from home a few times per week. Pt will regularly take milkshakes through [employer] when offered. Pt stated she will feel full after 2 soups.      Current Weight: 251.1 lbs (10/5), 261.9 lbs (10/11), 255.2 lbs (10/17), 246.2 lbs (10/30), weight fluctuations noted, now decreased from admission.   % Weight Change    Pertinent Medications: MEDICATIONS  (STANDING):  AQUAPHOR (petrolatum Ointment) 1 Application(s) Topical two times a day  influenza   Vaccine 0.5 milliLiter(s) IntraMuscular once  levETIRAcetam 500 milliGRAM(s) Oral two times a day  meropenem  IVPB 1000 milliGRAM(s) IV Intermittent every 8 hours  micafungin IVPB 100 milliGRAM(s) IV Intermittent daily  multivitamin 1 Tablet(s) Oral daily  pantoprazole    Tablet 40 milliGRAM(s) Oral before breakfast  sodium chloride 0.9% lock flush 20 milliLiter(s) IV Push once  sodium chloride 0.9%. 1000 milliLiter(s) (50 mL/Hr) IV Continuous <Continuous>    MEDICATIONS  (PRN):  acetaminophen   Tablet .. 650 milliGRAM(s) Oral every 6 hours PRN Mild Pain (1 - 3), Moderate Pain (4 - 6)  acetaminophen   Tablet .. 650 milliGRAM(s) Oral every 6 hours PRN Temp greater or equal to 38C (100.4F)  HYDROmorphone  Injectable 1 milliGRAM(s) IV Push every 4 hours PRN Moderate Pain (4 - 6)  HYDROmorphone  Injectable 0.5 milliGRAM(s) IV Push every 6 hours PRN Severe Pain (7 - 10)  metoclopramide Injectable 10 milliGRAM(s) IV Push every 6 hours PRN nausea  sodium chloride 0.9% lock flush 10 milliLiter(s) IV Push every 1 hour PRN After each medication administration  sodium chloride 0.9% lock flush 10 milliLiter(s) IV Push every 12 hours PRN Lumen of catheter NOT used    Pertinent Labs:  10-30 Na139 mmol/L Glu 87 mg/dL K+ 4.3 mmol/L Cr  0.63 mg/dL BUN 14 mg/dL 10-30 Phos 2.7 mg/dL 10-30 Alb 3.8 g/dL 10-05 [id number] 5.6 %      Skin: 1+ hebert ankle edema noted 10/29, skin intact    Estimated Needs:   [x ] no change since previous assessment  [ ] recalculated:       Previous Nutrition Diagnosis:     [ x] Inadequate Oral Intake-ongoing addressed with supplements  [x] Class 3 obesity-education not appropriate at this time given acute medical course         New Nutrition Diagnosis: [ x] not applicable         Interventions:     Recommend    1. Continue regular diet, consider re-adding ensure enlive 2 x daily (350 Kcal, 20g protein per 8 oz serving)   2. Continue to encourage po intake and obtain/honor food preferences as able, reviewed consuming smaller, more frequent meals with emphasis on food with protein first     Monitoring and Evaluation:     [x ] PO intake [x ] Tolerance to diet prescription [ x] weights [ x] follow up per protocol    [ ] other:

## 2018-10-30 NOTE — PROGRESS NOTE ADULT - PROBLEM SELECTOR PLAN 1
10/5 Peripheral flow confirms relapse with BCR/ABL rearrangement in 79.5% of cells  Continue Inotuzamab (Day 1, 8 and 15)  For BM bx after second cycle  Monitor CBC/Lytes and transfuse/replete PRN  Thrombocytopenia with SDH: Patient platelet refractory, per blood bank patient to receive 1/2 bags over 3 hours q 12 hours when HLA not available   Strict Is and Os/Daily weights/Mouth Care  Antiemetics, IVF  - Anemia: PRBCs 1 unit today 10/5 Peripheral flow confirms relapse with BCR/ABL rearrangement in 79.5% of cells  Cycle 1 Inotuzamab (Day 1, 8 and 15)  Cycle 2 to start on day 29  For BM bx after second cycle  Monitor CBC/Lytes and transfuse/replete PRN  Thrombocytopenia with SDH: Patient platelet refractory, per blood bank patient to receive 1/2 bags over 3 hours q 12 hours when HLA not available   Strict Is and Os/Daily weights/Mouth Care  Antiemetics, IVF  - Anemia: PRBCs 1 unit today 10/5 Peripheral flow confirms relapse with BCR/ABL rearrangement in 79.5% of cells  Cycle 1 Inotuzamab (Day 1, 8 and 15)  cycle 2 to begin 11/5/18   For BM bx after second cycle  Monitor CBC/Lytes and transfuse/replete PRN  Thrombocytopenia with SDH: Patient platelet refractory, per blood bank patient to receive 1/2 bags over 3 hours q 12 hours when HLA not available   Strict Is and Os/Daily weights/Mouth Care  Antiemetics, IVF  Thrombocytopenia-replace plt.

## 2018-10-30 NOTE — PROGRESS NOTE ADULT - ATTENDING COMMENTS
58F Ph(+) ALL s/p R HyperCVAD x 4 cycles IT MTX x2 s/p stem cell collection w/ Step 1(HD vp16 plus bernardino-c) regimen.  s/p Mahsa-Auto on 12/16/16. Pt was on Sprycel maintenance. 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 Has completed first cycle Inotuzumab. Headaches improved but still present. No accompanying symptoms.  Cont Keppra prophylaxis.  Repeat head CT showed small re-bleed. Follow up repeat CT stable.  Neutrophil percentage rising.   -GI bleed has resolved, Continue po protonix.  -Afebrile today. Blood Cx/Urine CT c/a/p 10/16- Scattered and patchy ground glass opacities in the right upper and lower lobes, possibly infection.  Heterogeneous thickening of the endometrial cavity.  Continue vancomycin, meropenem, Mycamine.    - goal plt ct >80K, but platelets are refractory to transfusion- transfuse 1/2 units over 3 hours twice a day. Keep hb >7. Obtain HLA matched platelets as possible.  - OOB, ambulation. 58F Ph(+) ALL s/p R HyperCVAD x 4 cycles IT MTX x2 s/p stem cell collection w/ Step 1(HD vp16 plus bernardino-c) regimen.  s/p Masha-Auto on 12/16/16. Pt was on Sprycel maintenance. 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 Has completed first cycle Inotuzumab. Headaches improved but still present. No accompanying symptoms.  Cont Keppra prophylaxis.  Repeat head CT showed small re-bleed. Follow up repeat CT stable.  Neutrophil percentage rising.   -GI bleed has resolved, Continue po protonix.  -Afebrile today. CT c/a/p 10/16- Scattered and patchy ground glass opacities in the right upper and lower lobes, possibly infection.  Heterogeneous thickening of the endometrial cavity.  Continue vancomycin, meropenem, Mycamine.    - goal plt ct >80K, but platelets are refractory to transfusion- transfuse 1/2 units over 3 hours twice a day. Keep hb >7. Obtain HLA matched platelets as possible.  - OOB, ambulation.

## 2018-10-30 NOTE — PROGRESS NOTE ADULT - SUBJECTIVE AND OBJECTIVE BOX
Diagnosis: Relapsed ALL Ph (+)     Protocol/Chemo Regimen: S/p Cycle 1 Inotuzamab Day 1, 8 and 15    Day: 22    Pt endorsed:  unchanged headaches - controlled with pain regimen     Review of Systems: Denies nausea, vomiting, diarrhea, chest pain, SOB      Pain scale: 4-5 now     Diet: Regular    Allergies    No Known Drug Allergies  shellfish (Nausea; Vomiting)    Intolerances        ANTIMICROBIALS  meropenem  IVPB 1000 milliGRAM(s) IV Intermittent every 8 hours  micafungin IVPB 100 milliGRAM(s) IV Intermittent daily      HEME/ONC MEDICATIONS      STANDING MEDICATIONS  influenza   Vaccine 0.5 milliLiter(s) IntraMuscular once  levETIRAcetam 500 milliGRAM(s) Oral two times a day  multivitamin 1 Tablet(s) Oral daily  pantoprazole    Tablet 40 milliGRAM(s) Oral before breakfast  sodium chloride 0.9% lock flush 20 milliLiter(s) IV Push once  sodium chloride 0.9%. 1000 milliLiter(s) IV Continuous <Continuous>      PRN MEDICATIONS  acetaminophen   Tablet .. 650 milliGRAM(s) Oral every 6 hours PRN  acetaminophen   Tablet .. 650 milliGRAM(s) Oral every 6 hours PRN  HYDROmorphone  Injectable 1 milliGRAM(s) IV Push every 4 hours PRN  HYDROmorphone  Injectable 0.5 milliGRAM(s) IV Push every 6 hours PRN  metoclopramide Injectable 10 milliGRAM(s) IV Push every 6 hours PRN  sodium chloride 0.9% lock flush 10 milliLiter(s) IV Push every 1 hour PRN  sodium chloride 0.9% lock flush 10 milliLiter(s) IV Push every 12 hours PRN        Vital Signs Last 24 Hrs  T(C): 36.4 (30 Oct 2018 03:30), Max: 37.2 (29 Oct 2018 20:00)  T(F): 97.6 (30 Oct 2018 03:30), Max: 98.9 (29 Oct 2018 20:00)  HR: 92 (30 Oct 2018 03:30) (78 - 98)  BP: 136/89 (30 Oct 2018 03:30) (117/77 - 152/90)  BP(mean): --  RR: 18 (30 Oct 2018 03:30) (17 - 18)  SpO2: 97% (30 Oct 2018 03:30) (97% - 100%)    PHYSICAL EXAM  General: adult in NAD  HEENT: clear oropharynx, no erythema, no ulcers, right side of head  Ommaya in place   CV: normal S1, S2, S4,  RRR  Lungs: clear to auscultation, no wheezes, no rales  Abdomen: soft, nontender, nondistended, normal BS  Ext: trace edema B/L LE   Skin: no rashes  Neuro: alert and oriented x 3  Central line: PICC CDI             LABS: Diagnosis: Relapsed ALL Ph (+)     Protocol/Chemo Regimen: S/p Cycle 1 Inotuzamab Day 1, 8 and 15    Day: 22    Pt endorsed:  unchanged headaches - controlled with pain regimen     Review of Systems: Denies nausea, vomiting, diarrhea, chest pain, SOB      Pain scale: 4-5 now     Diet: Regular    Allergies    No Known Drug Allergies  shellfish (Nausea; Vomiting)    Intolerances        ANTIMICROBIALS  meropenem  IVPB 1000 milliGRAM(s) IV Intermittent every 8 hours  micafungin IVPB 100 milliGRAM(s) IV Intermittent daily      HEME/ONC MEDICATIONS      STANDING MEDICATIONS  influenza   Vaccine 0.5 milliLiter(s) IntraMuscular once  levETIRAcetam 500 milliGRAM(s) Oral two times a day  multivitamin 1 Tablet(s) Oral daily  pantoprazole    Tablet 40 milliGRAM(s) Oral before breakfast  sodium chloride 0.9% lock flush 20 milliLiter(s) IV Push once  sodium chloride 0.9%. 1000 milliLiter(s) IV Continuous <Continuous>      PRN MEDICATIONS  acetaminophen   Tablet .. 650 milliGRAM(s) Oral every 6 hours PRN  acetaminophen   Tablet .. 650 milliGRAM(s) Oral every 6 hours PRN  HYDROmorphone  Injectable 1 milliGRAM(s) IV Push every 4 hours PRN  HYDROmorphone  Injectable 0.5 milliGRAM(s) IV Push every 6 hours PRN  metoclopramide Injectable 10 milliGRAM(s) IV Push every 6 hours PRN  sodium chloride 0.9% lock flush 10 milliLiter(s) IV Push every 1 hour PRN  sodium chloride 0.9% lock flush 10 milliLiter(s) IV Push every 12 hours PRN        Vital Signs Last 24 Hrs  T(C): 36.4 (30 Oct 2018 03:30), Max: 37.2 (29 Oct 2018 20:00)  T(F): 97.6 (30 Oct 2018 03:30), Max: 98.9 (29 Oct 2018 20:00)  HR: 92 (30 Oct 2018 03:30) (78 - 98)  BP: 136/89 (30 Oct 2018 03:30) (117/77 - 152/90)  BP(mean): --  RR: 18 (30 Oct 2018 03:30) (17 - 18)  SpO2: 97% (30 Oct 2018 03:30) (97% - 100%)    PHYSICAL EXAM  General: adult in NAD  HEENT: clear oropharynx, no erythema, no ulcers, right side of head  Ommaya in place   CV: normal S1, S2, S4,  RRR  Lungs: clear to auscultation, no wheezes, no rales  Abdomen: soft, nontender, nondistended, normal BS  Ext: trace edema B/L LE   Skin: no rashes  Neuro: alert and oriented x 3  Central line: PICC CDI     LABS:  LABS:    Blood Cultures:                           7.9    1.7   )-----------( 43       ( 30 Oct 2018 07:08 )             23.3         Mean Cell Volume : 88.8 fl  Mean Cell Hemoglobin : 30.0 pg  Mean Cell Hemoglobin Concentration : 33.8 gm/dL  Auto Neutrophil # : 0.5 K/uL  Auto Lymphocyte # : 1.2 K/uL  Auto Monocyte # : 0.0 K/uL  Auto Eosinophil # : 0.0 K/uL  Auto Basophil # : 0.0 K/uL  Auto Neutrophil % : 17.0 %  Auto Lymphocyte % : 60.0 %  Auto Monocyte % : 17.0 %  Auto Eosinophil % : x  Auto Basophil % : x      10-30    139  |  104  |  14  ----------------------------<  87  4.3   |  27  |  0.63    Ca    10.1      30 Oct 2018 07:08  Phos  2.7     10-30  Mg     2.3     10-30    TPro  6.9  /  Alb  3.8  /  TBili  0.5  /  DBili  x   /  AST  12  /  ALT  15  /  AlkPhos  170<H>  10-30      Mg 2.3  Phos 2.7      PT/INR - ( 29 Oct 2018 06:52 )   PT: 10.8 sec;   INR: 0.99 ratio         PTT - ( 29 Oct 2018 06:52 )  PTT:28.1 sec      Uric Acid --

## 2018-10-31 LAB
ALBUMIN SERPL ELPH-MCNC: 3.8 G/DL — SIGNIFICANT CHANGE UP (ref 3.3–5)
ALP SERPL-CCNC: 165 U/L — HIGH (ref 40–120)
ALT FLD-CCNC: 16 U/L — SIGNIFICANT CHANGE UP (ref 10–45)
ANION GAP SERPL CALC-SCNC: 8 MMOL/L — SIGNIFICANT CHANGE UP (ref 5–17)
AST SERPL-CCNC: 12 U/L — SIGNIFICANT CHANGE UP (ref 10–40)
BASOPHILS # BLD AUTO: 0 K/UL — SIGNIFICANT CHANGE UP (ref 0–0.2)
BASOPHILS NFR BLD AUTO: 2 % — SIGNIFICANT CHANGE UP (ref 0–2)
BILIRUB SERPL-MCNC: 0.5 MG/DL — SIGNIFICANT CHANGE UP (ref 0.2–1.2)
BUN SERPL-MCNC: 13 MG/DL — SIGNIFICANT CHANGE UP (ref 7–23)
CALCIUM SERPL-MCNC: 10.2 MG/DL — SIGNIFICANT CHANGE UP (ref 8.4–10.5)
CHLORIDE SERPL-SCNC: 105 MMOL/L — SIGNIFICANT CHANGE UP (ref 96–108)
CO2 SERPL-SCNC: 27 MMOL/L — SIGNIFICANT CHANGE UP (ref 22–31)
CREAT SERPL-MCNC: 0.6 MG/DL — SIGNIFICANT CHANGE UP (ref 0.5–1.3)
EOSINOPHIL # BLD AUTO: 0 K/UL — SIGNIFICANT CHANGE UP (ref 0–0.5)
EOSINOPHIL NFR BLD AUTO: 0.6 % — SIGNIFICANT CHANGE UP (ref 0–6)
GLUCOSE SERPL-MCNC: 106 MG/DL — HIGH (ref 70–99)
HCT VFR BLD CALC: 23.2 % — LOW (ref 34.5–45)
HGB BLD-MCNC: 8.3 G/DL — LOW (ref 11.5–15.5)
LDH SERPL L TO P-CCNC: 201 U/L — SIGNIFICANT CHANGE UP (ref 50–242)
LYMPHOCYTES # BLD AUTO: 1 K/UL — SIGNIFICANT CHANGE UP (ref 1–3.3)
LYMPHOCYTES # BLD AUTO: 62.4 % — HIGH (ref 13–44)
MAGNESIUM SERPL-MCNC: 2.4 MG/DL — SIGNIFICANT CHANGE UP (ref 1.6–2.6)
MCHC RBC-ENTMCNC: 31.6 PG — SIGNIFICANT CHANGE UP (ref 27–34)
MCHC RBC-ENTMCNC: 35.7 GM/DL — SIGNIFICANT CHANGE UP (ref 32–36)
MCV RBC AUTO: 88.7 FL — SIGNIFICANT CHANGE UP (ref 80–100)
MONOCYTES # BLD AUTO: 0.3 K/UL — SIGNIFICANT CHANGE UP (ref 0–0.9)
MONOCYTES NFR BLD AUTO: 16.2 % — HIGH (ref 2–14)
NEUTROPHILS # BLD AUTO: 0.3 K/UL — LOW (ref 1.8–7.4)
NEUTROPHILS NFR BLD AUTO: 18.8 % — LOW (ref 43–77)
PHOSPHATE SERPL-MCNC: 2.9 MG/DL — SIGNIFICANT CHANGE UP (ref 2.5–4.5)
PLATELET # BLD AUTO: 33 K/UL — LOW (ref 150–400)
POTASSIUM SERPL-MCNC: 4.2 MMOL/L — SIGNIFICANT CHANGE UP (ref 3.5–5.3)
POTASSIUM SERPL-SCNC: 4.2 MMOL/L — SIGNIFICANT CHANGE UP (ref 3.5–5.3)
PROT SERPL-MCNC: 6.9 G/DL — SIGNIFICANT CHANGE UP (ref 6–8.3)
RBC # BLD: 2.62 M/UL — LOW (ref 3.8–5.2)
RBC # FLD: 15 % — HIGH (ref 10.3–14.5)
SODIUM SERPL-SCNC: 140 MMOL/L — SIGNIFICANT CHANGE UP (ref 135–145)
URATE SERPL-MCNC: 2.5 MG/DL — SIGNIFICANT CHANGE UP (ref 2.5–7)
WBC # BLD: 1.7 K/UL — LOW (ref 3.8–10.5)
WBC # FLD AUTO: 1.7 K/UL — LOW (ref 3.8–10.5)

## 2018-10-31 PROCEDURE — 99232 SBSQ HOSP IP/OBS MODERATE 35: CPT

## 2018-10-31 RX ADMIN — SODIUM CHLORIDE 50 MILLILITER(S): 9 INJECTION INTRAMUSCULAR; INTRAVENOUS; SUBCUTANEOUS at 21:12

## 2018-10-31 RX ADMIN — HYDROMORPHONE HYDROCHLORIDE 1 MILLIGRAM(S): 2 INJECTION INTRAMUSCULAR; INTRAVENOUS; SUBCUTANEOUS at 17:00

## 2018-10-31 RX ADMIN — PANTOPRAZOLE SODIUM 40 MILLIGRAM(S): 20 TABLET, DELAYED RELEASE ORAL at 06:14

## 2018-10-31 RX ADMIN — HYDROMORPHONE HYDROCHLORIDE 1 MILLIGRAM(S): 2 INJECTION INTRAMUSCULAR; INTRAVENOUS; SUBCUTANEOUS at 04:37

## 2018-10-31 RX ADMIN — MEROPENEM 200 MILLIGRAM(S): 1 INJECTION INTRAVENOUS at 13:49

## 2018-10-31 RX ADMIN — MEROPENEM 200 MILLIGRAM(S): 1 INJECTION INTRAVENOUS at 21:12

## 2018-10-31 RX ADMIN — MICAFUNGIN SODIUM 105 MILLIGRAM(S): 100 INJECTION, POWDER, LYOPHILIZED, FOR SOLUTION INTRAVENOUS at 12:14

## 2018-10-31 RX ADMIN — HYDROMORPHONE HYDROCHLORIDE 1 MILLIGRAM(S): 2 INJECTION INTRAMUSCULAR; INTRAVENOUS; SUBCUTANEOUS at 16:43

## 2018-10-31 RX ADMIN — LEVETIRACETAM 500 MILLIGRAM(S): 250 TABLET, FILM COATED ORAL at 06:14

## 2018-10-31 RX ADMIN — HYDROMORPHONE HYDROCHLORIDE 1 MILLIGRAM(S): 2 INJECTION INTRAMUSCULAR; INTRAVENOUS; SUBCUTANEOUS at 08:39

## 2018-10-31 RX ADMIN — SODIUM CHLORIDE 50 MILLILITER(S): 9 INJECTION INTRAMUSCULAR; INTRAVENOUS; SUBCUTANEOUS at 16:47

## 2018-10-31 RX ADMIN — HYDROMORPHONE HYDROCHLORIDE 1 MILLIGRAM(S): 2 INJECTION INTRAMUSCULAR; INTRAVENOUS; SUBCUTANEOUS at 00:11

## 2018-10-31 RX ADMIN — HYDROMORPHONE HYDROCHLORIDE 1 MILLIGRAM(S): 2 INJECTION INTRAMUSCULAR; INTRAVENOUS; SUBCUTANEOUS at 20:50

## 2018-10-31 RX ADMIN — HYDROMORPHONE HYDROCHLORIDE 1 MILLIGRAM(S): 2 INJECTION INTRAMUSCULAR; INTRAVENOUS; SUBCUTANEOUS at 00:41

## 2018-10-31 RX ADMIN — HYDROMORPHONE HYDROCHLORIDE 1 MILLIGRAM(S): 2 INJECTION INTRAMUSCULAR; INTRAVENOUS; SUBCUTANEOUS at 12:45

## 2018-10-31 RX ADMIN — Medication 1 APPLICATION(S): at 06:14

## 2018-10-31 RX ADMIN — Medication 1 APPLICATION(S): at 16:47

## 2018-10-31 RX ADMIN — LEVETIRACETAM 500 MILLIGRAM(S): 250 TABLET, FILM COATED ORAL at 16:47

## 2018-10-31 RX ADMIN — Medication 1 TABLET(S): at 12:15

## 2018-10-31 RX ADMIN — HYDROMORPHONE HYDROCHLORIDE 1 MILLIGRAM(S): 2 INJECTION INTRAMUSCULAR; INTRAVENOUS; SUBCUTANEOUS at 21:20

## 2018-10-31 RX ADMIN — HYDROMORPHONE HYDROCHLORIDE 1 MILLIGRAM(S): 2 INJECTION INTRAMUSCULAR; INTRAVENOUS; SUBCUTANEOUS at 12:15

## 2018-10-31 RX ADMIN — HYDROMORPHONE HYDROCHLORIDE 1 MILLIGRAM(S): 2 INJECTION INTRAMUSCULAR; INTRAVENOUS; SUBCUTANEOUS at 08:09

## 2018-10-31 RX ADMIN — MEROPENEM 200 MILLIGRAM(S): 1 INJECTION INTRAVENOUS at 06:13

## 2018-10-31 RX ADMIN — HYDROMORPHONE HYDROCHLORIDE 1 MILLIGRAM(S): 2 INJECTION INTRAMUSCULAR; INTRAVENOUS; SUBCUTANEOUS at 04:07

## 2018-10-31 NOTE — PROGRESS NOTE ADULT - PROBLEM SELECTOR PLAN 1
10/5 Peripheral flow confirms relapse with BCR/ABL rearrangement in 79.5% of cells  Cycle 1 Inotuzamab (Day 1, 8 and 15). cycle 2 to begin 11/5/18   For BM bx after second cycle  Monitor CBC/Lytes and transfuse/replete PRN  Thrombocytopenia with SDH: Patient platelet refractory, per blood bank patient to receive 1/2 bags over 3 hours q 12 hours when HLA not available   Strict Is and Os/Daily weights/Mouth Care  Antiemetics, IVF 10/5 Peripheral flow confirms relapse with BCR/ABL rearrangement in 79.5% of cells  Cycle 1 Inotuzamab (Day 1, 8 and 15). cycle 2 to begin 11/5/18   For BM bx after second cycle  Monitor CBC/Lytes and transfuse/replete PRN  Thrombocytopenia with SDH: Patient platelet refractory, per blood bank patient to receive 1/2 bags over 3 hours q 12 hours when HLA not available   Strict Is and Os/Daily weights/Mouth Care  Antiemetics, IVF  - Thrombocytopenia: Platelets 1/2 unit over 3 hours every 12 hours (no HLA available today)

## 2018-10-31 NOTE — PROGRESS NOTE ADULT - ATTENDING COMMENTS
58F Ph(+) ALL s/p R HyperCVAD x 4 cycles IT MTX x2 s/p stem cell collection w/ Step 1(HD vp16 plus bernardino-c) regimen.  s/p Masha-Auto on 12/16/16. Pt was on Sprycel maintenance. 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 Has completed first cycle Inotuzumab. Headaches improved but still present. No accompanying symptoms.  Cont Keppra prophylaxis.  Repeat head CT showed small re-bleed. Follow up repeat CT stable.  Neutrophil percentage rising.   -GI bleed has resolved, Continue po protonix.  -Afebrile today. CT c/a/p 10/16- Scattered and patchy ground glass opacities in the right upper and lower lobes, possibly infection.  Heterogeneous thickening of the endometrial cavity.  Continue vancomycin, meropenem, Mycamine.    - goal plt ct >80K, but platelets are refractory to transfusion- transfuse 1/2 units over 3 hours twice a day. Keep hb >7. Obtain HLA matched platelets as possible.  - OOB, ambulation. 58F Ph(+) ALL s/p R HyperCVAD x 4 cycles IT MTX x2 s/p stem cell collection w/ Step 1(HD vp16 plus bernardino-c) regimen.  s/p Masha-Auto on 12/16/16. Pt was on Sprycel maintenance. 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 Has completed first cycle Inotuzumab. Headaches improved, but still present. No accompanying symptoms.  Cont Keppra prophylaxis.  Repeat head CT showed small re-bleed. Follow up repeat CT stable.  Neutrophil percentage rising.   -GI bleed has resolved, Continue po protonix.  -Afebrile today. CT c/a/p 10/16- Scattered and patchy ground glass opacities in the right upper and lower lobes, possibly infection.  Heterogeneous thickening of the endometrial cavity.  Continue vancomycin, meropenem, Mycamine.    - goal plt ct >80K, but platelets are refractory to transfusion- transfuse 1/2 units over 3 hours twice a day. Keep hb >7. Obtain HLA matched platelets as possible.  - OOB, ambulation.  Cycle 2 Inotuzumab due next week.

## 2018-10-31 NOTE — PROGRESS NOTE ADULT - SUBJECTIVE AND OBJECTIVE BOX
Diagnosis: Relapsed ALL Ph (+)     Protocol/Chemo Regimen: S/p Cycle 1 Inotuzamab Day 1, 8 and 15    Day: 23    Pt endorsed: unchanged headaches - controlled with pain regimen     Review of Systems: Denies nausea, vomiting, diarrhea, chest pain, SOB      Pain scale: 4-5 now     Diet: Regular    Allergies: No Known Drug Allergies, shellfish (Nausea; Vomiting)    ------------------ Diagnosis: Relapsed ALL Ph (+)     Protocol/Chemo Regimen: S/p Cycle 1 Inotuzamab Day 1, 8 and 15    Day: 24    Pt endorsed: unchanged headaches - controlled with current pain regimen     Review of Systems: Denies nausea, vomiting, diarrhea, chest pain, SOB      Pain scale: 4-5 now     Diet: Regular    Allergies: No Known Drug Allergies, shellfish (Nausea; Vomiting)    ANTIMICROBIALS  meropenem  IVPB 1000 milliGRAM(s) IV Intermittent every 8 hours  micafungin IVPB 100 milliGRAM(s) IV Intermittent daily    STANDING MEDICATIONS  AQUAPHOR (petrolatum Ointment) 1 Application(s) Topical two times a day  influenza   Vaccine 0.5 milliLiter(s) IntraMuscular once  levETIRAcetam 500 milliGRAM(s) Oral two times a day  multivitamin 1 Tablet(s) Oral daily  pantoprazole    Tablet 40 milliGRAM(s) Oral before breakfast  sodium chloride 0.9% lock flush 20 milliLiter(s) IV Push once  sodium chloride 0.9%. 1000 milliLiter(s) IV Continuous <Continuous>    PRN MEDICATIONS  acetaminophen   Tablet .. 650 milliGRAM(s) Oral every 6 hours PRN  acetaminophen   Tablet .. 650 milliGRAM(s) Oral every 6 hours PRN  HYDROmorphone  Injectable 1 milliGRAM(s) IV Push every 4 hours PRN  HYDROmorphone  Injectable 0.5 milliGRAM(s) IV Push every 6 hours PRN  metoclopramide Injectable 10 milliGRAM(s) IV Push every 6 hours PRN  sodium chloride 0.9% lock flush 10 milliLiter(s) IV Push every 1 hour PRN  sodium chloride 0.9% lock flush 10 milliLiter(s) IV Push every 12 hours PRN    Vital Signs Last 24 Hrs  T(C): 36.4 (31 Oct 2018 05:25), Max: 37 (30 Oct 2018 17:42)  T(F): 97.5 (31 Oct 2018 05:25), Max: 98.6 (30 Oct 2018 17:42)  HR: 77 (31 Oct 2018 05:25) (77 - 87)  BP: 138/81 (31 Oct 2018 05:25) (130/70 - 159/98)  BP(mean): --  RR: 18 (31 Oct 2018 05:25) (17 - 18)  SpO2: 99% (31 Oct 2018 05:25) (96% - 100%)    PHYSICAL EXAM  General: adult in NAD  HEENT: clear oropharynx, no erythema, no ulcers  Neck: supple  CV: normal S1, S2, RRR  Lungs: clear to auscultation, no wheezes, no rales  Abdomen: soft, nontender, nondistended, normal BS  Ext: no edema  Skin: no rashes  Neuro: alert and oriented x 3  Central line: normal     LABS:                        8.3    1.7   )-----------( 33       ( 31 Oct 2018 06:50 )             23.2     Mean Cell Volume : 88.7 fl  Mean Cell Hemoglobin : 31.6 pg  Mean Cell Hemoglobin Concentration : 35.7 gm/dL  Auto Neutrophil # : 0.3 K/uL  Auto Lymphocyte # : 1.0 K/uL  Auto Monocyte # : 0.3 K/uL  Auto Eosinophil # : 0.0 K/uL  Auto Basophil # : 0.0 K/uL  Auto Neutrophil % : 18.8 %  Auto Lymphocyte % : 62.4 %  Auto Monocyte % : 16.2 %  Auto Eosinophil % : 0.6 %  Auto Basophil % : 2.0 %    10-31  140  |  105  |  13  ----------------------------<  106<H>  4.2   |  27  |  0.60    Ca    10.2      31 Oct 2018 06:50  Phos  2.9     10-31  Mg     2.4     10-31    TPro  6.9  /  Alb  3.8  /  TBili  0.5  /  DBili  x   /  AST  12  /  ALT  16  /  AlkPhos  165<H>  10-31    Mg 2.4  Phos 2.9      Uric Acid 2.5    RADIOLOGY & ADDITIONAL STUDIES:    < from: CT Head No Cont (10.23.18 @ 16:30) >  IMPRESSION: No change in posterior interhemispheric subdural hemorrhage   and hemorrhage extending along the left tentorial leaf compared with 8:44   AM. No hydrocephalus. Mild cerebellar atrophy. Diagnosis: Relapsed ALL Ph (+)     Protocol/Chemo Regimen: S/p Cycle 1 Inotuzamab Day 1, 8 and 15    Day: 24    Pt endorsed: unchanged headaches - controlled with current pain regimen     Review of Systems: Denies nausea, vomiting, diarrhea, chest pain, SOB      Pain scale: 0    Diet: Regular    Allergies: No Known Drug Allergies, shellfish (Nausea; Vomiting)    ANTIMICROBIALS  meropenem  IVPB 1000 milliGRAM(s) IV Intermittent every 8 hours  micafungin IVPB 100 milliGRAM(s) IV Intermittent daily    STANDING MEDICATIONS  AQUAPHOR (petrolatum Ointment) 1 Application(s) Topical two times a day  influenza   Vaccine 0.5 milliLiter(s) IntraMuscular once  levETIRAcetam 500 milliGRAM(s) Oral two times a day  multivitamin 1 Tablet(s) Oral daily  pantoprazole    Tablet 40 milliGRAM(s) Oral before breakfast  sodium chloride 0.9% lock flush 20 milliLiter(s) IV Push once  sodium chloride 0.9%. 1000 milliLiter(s) IV Continuous <Continuous>    PRN MEDICATIONS  acetaminophen   Tablet .. 650 milliGRAM(s) Oral every 6 hours PRN  acetaminophen   Tablet .. 650 milliGRAM(s) Oral every 6 hours PRN  HYDROmorphone  Injectable 1 milliGRAM(s) IV Push every 4 hours PRN  HYDROmorphone  Injectable 0.5 milliGRAM(s) IV Push every 6 hours PRN  metoclopramide Injectable 10 milliGRAM(s) IV Push every 6 hours PRN  sodium chloride 0.9% lock flush 10 milliLiter(s) IV Push every 1 hour PRN  sodium chloride 0.9% lock flush 10 milliLiter(s) IV Push every 12 hours PRN    Vital Signs Last 24 Hrs  T(C): 36.4 (31 Oct 2018 05:25), Max: 37 (30 Oct 2018 17:42)  T(F): 97.5 (31 Oct 2018 05:25), Max: 98.6 (30 Oct 2018 17:42)  HR: 77 (31 Oct 2018 05:25) (77 - 87)  BP: 138/81 (31 Oct 2018 05:25) (130/70 - 159/98)  BP(mean): --  RR: 18 (31 Oct 2018 05:25) (17 - 18)  SpO2: 99% (31 Oct 2018 05:25) (96% - 100%)    PHYSICAL EXAM  General: adult in NAD  HEENT: clear oropharynx, no erythema, no ulcers  Neck: supple  CV: normal S1, S2, RRR  Lungs: clear to auscultation, no wheezes, no rales  Abdomen: soft, nontender, nondistended, normal BS  Ext: no edema  Skin: no rashes  Neuro: alert and oriented x 3  Central line: normal     LABS:                        8.3    1.7   )-----------( 33       ( 31 Oct 2018 06:50 )             23.2     Mean Cell Volume : 88.7 fl  Mean Cell Hemoglobin : 31.6 pg  Mean Cell Hemoglobin Concentration : 35.7 gm/dL  Auto Neutrophil # : 0.3 K/uL  Auto Lymphocyte # : 1.0 K/uL  Auto Monocyte # : 0.3 K/uL  Auto Eosinophil # : 0.0 K/uL  Auto Basophil # : 0.0 K/uL  Auto Neutrophil % : 18.8 %  Auto Lymphocyte % : 62.4 %  Auto Monocyte % : 16.2 %  Auto Eosinophil % : 0.6 %  Auto Basophil % : 2.0 %    10-31  140  |  105  |  13  ----------------------------<  106<H>  4.2   |  27  |  0.60    Ca    10.2      31 Oct 2018 06:50  Phos  2.9     10-31  Mg     2.4     10-31    TPro  6.9  /  Alb  3.8  /  TBili  0.5  /  DBili  x   /  AST  12  /  ALT  16  /  AlkPhos  165<H>  10-31    Mg 2.4  Phos 2.9      Uric Acid 2.5    RADIOLOGY & ADDITIONAL STUDIES:    < from: CT Head No Cont (10.23.18 @ 16:30) >  IMPRESSION: No change in posterior interhemispheric subdural hemorrhage   and hemorrhage extending along the left tentorial leaf compared with 8:44   AM. No hydrocephalus. Mild cerebellar atrophy.

## 2018-11-01 LAB
ALBUMIN SERPL ELPH-MCNC: 3.7 G/DL — SIGNIFICANT CHANGE UP (ref 3.3–5)
ALP SERPL-CCNC: 166 U/L — HIGH (ref 40–120)
ALT FLD-CCNC: 15 U/L — SIGNIFICANT CHANGE UP (ref 10–45)
ANION GAP SERPL CALC-SCNC: 8 MMOL/L — SIGNIFICANT CHANGE UP (ref 5–17)
APTT BLD: 30.1 SEC — SIGNIFICANT CHANGE UP (ref 27.5–36.3)
AST SERPL-CCNC: 14 U/L — SIGNIFICANT CHANGE UP (ref 10–40)
BASOPHILS # BLD AUTO: 0 K/UL — SIGNIFICANT CHANGE UP (ref 0–0.2)
BILIRUB SERPL-MCNC: 0.4 MG/DL — SIGNIFICANT CHANGE UP (ref 0.2–1.2)
BUN SERPL-MCNC: 16 MG/DL — SIGNIFICANT CHANGE UP (ref 7–23)
CALCIUM SERPL-MCNC: 10.6 MG/DL — HIGH (ref 8.4–10.5)
CHLORIDE SERPL-SCNC: 101 MMOL/L — SIGNIFICANT CHANGE UP (ref 96–108)
CO2 SERPL-SCNC: 28 MMOL/L — SIGNIFICANT CHANGE UP (ref 22–31)
CREAT SERPL-MCNC: 0.67 MG/DL — SIGNIFICANT CHANGE UP (ref 0.5–1.3)
EOSINOPHIL # BLD AUTO: 0 K/UL — SIGNIFICANT CHANGE UP (ref 0–0.5)
FIBRINOGEN PPP-MCNC: 583 MG/DL — HIGH (ref 350–510)
GLUCOSE SERPL-MCNC: 91 MG/DL — SIGNIFICANT CHANGE UP (ref 70–99)
HCT VFR BLD CALC: 23.8 % — LOW (ref 34.5–45)
HGB BLD-MCNC: 8 G/DL — LOW (ref 11.5–15.5)
INR BLD: 0.9 RATIO — SIGNIFICANT CHANGE UP (ref 0.88–1.16)
LDH SERPL L TO P-CCNC: 192 U/L — SIGNIFICANT CHANGE UP (ref 50–242)
LYMPHOCYTES # BLD AUTO: 1.2 K/UL — SIGNIFICANT CHANGE UP (ref 1–3.3)
LYMPHOCYTES # BLD AUTO: 60 % — HIGH (ref 13–44)
MAGNESIUM SERPL-MCNC: 2.2 MG/DL — SIGNIFICANT CHANGE UP (ref 1.6–2.6)
MCHC RBC-ENTMCNC: 30.2 PG — SIGNIFICANT CHANGE UP (ref 27–34)
MCHC RBC-ENTMCNC: 33.7 GM/DL — SIGNIFICANT CHANGE UP (ref 32–36)
MCV RBC AUTO: 89.8 FL — SIGNIFICANT CHANGE UP (ref 80–100)
MONOCYTES # BLD AUTO: 0.4 K/UL — SIGNIFICANT CHANGE UP (ref 0–0.9)
MONOCYTES NFR BLD AUTO: 16 % — HIGH (ref 2–14)
NEUTROPHILS # BLD AUTO: 0.5 K/UL — LOW (ref 1.8–7.4)
NEUTROPHILS NFR BLD AUTO: 22 % — LOW (ref 43–77)
PHOSPHATE SERPL-MCNC: 2.7 MG/DL — SIGNIFICANT CHANGE UP (ref 2.5–4.5)
PLATELET # BLD AUTO: 39 K/UL — LOW (ref 150–400)
POTASSIUM SERPL-MCNC: 4.2 MMOL/L — SIGNIFICANT CHANGE UP (ref 3.5–5.3)
POTASSIUM SERPL-SCNC: 4.2 MMOL/L — SIGNIFICANT CHANGE UP (ref 3.5–5.3)
PROT SERPL-MCNC: 6.8 G/DL — SIGNIFICANT CHANGE UP (ref 6–8.3)
PROTHROM AB SERPL-ACNC: 10.2 SEC — SIGNIFICANT CHANGE UP (ref 10–12.9)
RBC # BLD: 2.65 M/UL — LOW (ref 3.8–5.2)
RBC # FLD: 15.6 % — HIGH (ref 10.3–14.5)
SODIUM SERPL-SCNC: 137 MMOL/L — SIGNIFICANT CHANGE UP (ref 135–145)
URATE SERPL-MCNC: 2.8 MG/DL — SIGNIFICANT CHANGE UP (ref 2.5–7)
WBC # BLD: 2 K/UL — LOW (ref 3.8–10.5)
WBC # FLD AUTO: 2 K/UL — LOW (ref 3.8–10.5)

## 2018-11-01 PROCEDURE — 99232 SBSQ HOSP IP/OBS MODERATE 35: CPT

## 2018-11-01 RX ORDER — HYDROMORPHONE HYDROCHLORIDE 2 MG/ML
0.5 INJECTION INTRAMUSCULAR; INTRAVENOUS; SUBCUTANEOUS EVERY 6 HOURS
Qty: 0 | Refills: 0 | Status: DISCONTINUED | OUTPATIENT
Start: 2018-11-01 | End: 2018-11-03

## 2018-11-01 RX ORDER — HYDROMORPHONE HYDROCHLORIDE 2 MG/ML
1 INJECTION INTRAMUSCULAR; INTRAVENOUS; SUBCUTANEOUS EVERY 4 HOURS
Qty: 0 | Refills: 0 | Status: DISCONTINUED | OUTPATIENT
Start: 2018-11-01 | End: 2018-11-03

## 2018-11-01 RX ADMIN — HYDROMORPHONE HYDROCHLORIDE 1 MILLIGRAM(S): 2 INJECTION INTRAMUSCULAR; INTRAVENOUS; SUBCUTANEOUS at 05:27

## 2018-11-01 RX ADMIN — MEROPENEM 200 MILLIGRAM(S): 1 INJECTION INTRAVENOUS at 05:26

## 2018-11-01 RX ADMIN — Medication 1 APPLICATION(S): at 05:28

## 2018-11-01 RX ADMIN — Medication 1 APPLICATION(S): at 18:39

## 2018-11-01 RX ADMIN — MEROPENEM 200 MILLIGRAM(S): 1 INJECTION INTRAVENOUS at 13:13

## 2018-11-01 RX ADMIN — HYDROMORPHONE HYDROCHLORIDE 1 MILLIGRAM(S): 2 INJECTION INTRAMUSCULAR; INTRAVENOUS; SUBCUTANEOUS at 17:35

## 2018-11-01 RX ADMIN — SODIUM CHLORIDE 50 MILLILITER(S): 9 INJECTION INTRAMUSCULAR; INTRAVENOUS; SUBCUTANEOUS at 13:14

## 2018-11-01 RX ADMIN — MICAFUNGIN SODIUM 105 MILLIGRAM(S): 100 INJECTION, POWDER, LYOPHILIZED, FOR SOLUTION INTRAVENOUS at 11:11

## 2018-11-01 RX ADMIN — HYDROMORPHONE HYDROCHLORIDE 1 MILLIGRAM(S): 2 INJECTION INTRAMUSCULAR; INTRAVENOUS; SUBCUTANEOUS at 13:13

## 2018-11-01 RX ADMIN — HYDROMORPHONE HYDROCHLORIDE 1 MILLIGRAM(S): 2 INJECTION INTRAMUSCULAR; INTRAVENOUS; SUBCUTANEOUS at 17:17

## 2018-11-01 RX ADMIN — LEVETIRACETAM 500 MILLIGRAM(S): 250 TABLET, FILM COATED ORAL at 17:18

## 2018-11-01 RX ADMIN — HYDROMORPHONE HYDROCHLORIDE 1 MILLIGRAM(S): 2 INJECTION INTRAMUSCULAR; INTRAVENOUS; SUBCUTANEOUS at 09:11

## 2018-11-01 RX ADMIN — HYDROMORPHONE HYDROCHLORIDE 1 MILLIGRAM(S): 2 INJECTION INTRAMUSCULAR; INTRAVENOUS; SUBCUTANEOUS at 13:35

## 2018-11-01 RX ADMIN — HYDROMORPHONE HYDROCHLORIDE 1 MILLIGRAM(S): 2 INJECTION INTRAMUSCULAR; INTRAVENOUS; SUBCUTANEOUS at 00:51

## 2018-11-01 RX ADMIN — LEVETIRACETAM 500 MILLIGRAM(S): 250 TABLET, FILM COATED ORAL at 05:27

## 2018-11-01 RX ADMIN — HYDROMORPHONE HYDROCHLORIDE 1 MILLIGRAM(S): 2 INJECTION INTRAMUSCULAR; INTRAVENOUS; SUBCUTANEOUS at 01:21

## 2018-11-01 RX ADMIN — HYDROMORPHONE HYDROCHLORIDE 1 MILLIGRAM(S): 2 INJECTION INTRAMUSCULAR; INTRAVENOUS; SUBCUTANEOUS at 21:19

## 2018-11-01 RX ADMIN — MEROPENEM 200 MILLIGRAM(S): 1 INJECTION INTRAVENOUS at 21:23

## 2018-11-01 RX ADMIN — PANTOPRAZOLE SODIUM 40 MILLIGRAM(S): 20 TABLET, DELAYED RELEASE ORAL at 05:27

## 2018-11-01 RX ADMIN — HYDROMORPHONE HYDROCHLORIDE 1 MILLIGRAM(S): 2 INJECTION INTRAMUSCULAR; INTRAVENOUS; SUBCUTANEOUS at 21:49

## 2018-11-01 RX ADMIN — Medication 1 TABLET(S): at 11:14

## 2018-11-01 RX ADMIN — HYDROMORPHONE HYDROCHLORIDE 1 MILLIGRAM(S): 2 INJECTION INTRAMUSCULAR; INTRAVENOUS; SUBCUTANEOUS at 09:30

## 2018-11-01 RX ADMIN — HYDROMORPHONE HYDROCHLORIDE 1 MILLIGRAM(S): 2 INJECTION INTRAMUSCULAR; INTRAVENOUS; SUBCUTANEOUS at 05:57

## 2018-11-01 NOTE — PROGRESS NOTE ADULT - PROBLEM SELECTOR PLAN 1
10/5 Peripheral flow confirms relapse with BCR/ABL rearrangement in 79.5% of cells  Cycle 1 Inotuzamab (Day 1, 8 and 15). cycle 2 to begin 11/5/18   For BM bx after second cycle  Monitor CBC/Lytes and transfuse/replete PRN  Thrombocytopenia with SDH: Patient platelet refractory, per blood bank patient to receive 1/2 bags over 3 hours q 12 hours when HLA not available   Strict Is and Os/Daily weights/Mouth Care  Antiemetics, IVF  - Thrombocytopenia: Platelets 1/2 unit over 3 hours every 12 hours (no HLA available today)

## 2018-11-01 NOTE — PROGRESS NOTE ADULT - ATTENDING COMMENTS
58F Ph(+) ALL s/p R HyperCVAD x 4 cycles IT MTX x2 s/p stem cell collection w/ Step 1(HD vp16 plus bernardino-c) regimen.  s/p Masha-Auto on 12/16/16. Pt was on Sprycel maintenance. 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 Has completed first cycle Inotuzumab. Headaches improved, but still present. No accompanying symptoms.  Cont Keppra prophylaxis.  Repeat head CT showed small re-bleed. Follow up repeat CT stable.  Neutrophil percentage rising.   -GI bleed has resolved, Continue po protonix.  -Afebrile today. CT c/a/p 10/16- Scattered and patchy ground glass opacities in the right upper and lower lobes, possibly infection.  Heterogeneous thickening of the endometrial cavity.  Continue vancomycin, meropenem, Mycamine.    - goal plt ct >80K, but platelets are refractory to transfusion- transfuse 1/2 units over 3 hours twice a day. Keep hb >7. Obtain HLA matched platelets as possible.  - OOB, ambulation.  Cycle 2 Inotuzumab due next week. 58F Ph(+) ALL s/p R HyperCVAD x 4 cycles IT MTX x2 s/p stem cell collection w/ Step 1(HD vp16 plus bernardino-c) regimen.  s/p Masha-Auto on 12/16/16. Pt was on Sprycel maintenance. 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 Has completed first cycle Inotuzumab. Headaches improved, but still present. No accompanying symptoms.  Cont Keppra prophylaxis.  Repeat head CT showed small re-bleed. Follow up repeat CT stable. To resume Inotuzumab on Monday.  -GI bleed has resolved, Continue po protonix.  -Afebrile today. CT c/a/p 10/16- Scattered and patchy ground glass opacities in the right upper and lower lobes, possibly infection.  Heterogeneous thickening of the endometrial cavity.  Continue vancomycin, meropenem, Mycamine.    - goal plt ct >80K, but platelets are refractory to transfusion- transfuse 1/2 units over 3 hours twice a day. Keep hb >7. Obtain HLA matched platelets as possible.  - OOB, ambulation.  Cycle 2 Inotuzumab due next week.

## 2018-11-01 NOTE — PROGRESS NOTE ADULT - SUBJECTIVE AND OBJECTIVE BOX
Diagnosis: Relapsed ALL Ph (+)     Protocol/Chemo Regimen: S/p Cycle 1 Inotuzamab Day 1, 8 and 15    Day: 25    Pt endorsed: unchanged headaches - controlled with current pain regimen     Review of Systems: Denies nausea, vomiting, diarrhea, chest pain, SOB      Pain scale: 0    Diet: Regular    Allergies: No Known Drug Allergies, shellfish (Nausea; Vomiting)      ANTIMICROBIALS  meropenem  IVPB 1000 milliGRAM(s) IV Intermittent every 8 hours  micafungin IVPB 100 milliGRAM(s) IV Intermittent daily      STANDING MEDICATIONS  AQUAPHOR (petrolatum Ointment) 1 Application(s) Topical two times a day  influenza   Vaccine 0.5 milliLiter(s) IntraMuscular once  levETIRAcetam 500 milliGRAM(s) Oral two times a day  multivitamin 1 Tablet(s) Oral daily  pantoprazole    Tablet 40 milliGRAM(s) Oral before breakfast  sodium chloride 0.9% lock flush 20 milliLiter(s) IV Push once  sodium chloride 0.9%. 1000 milliLiter(s) IV Continuous <Continuous>      PRN MEDICATIONS  acetaminophen   Tablet .. 650 milliGRAM(s) Oral every 6 hours PRN  acetaminophen   Tablet .. 650 milliGRAM(s) Oral every 6 hours PRN  HYDROmorphone  Injectable 1 milliGRAM(s) IV Push every 4 hours PRN  HYDROmorphone  Injectable 0.5 milliGRAM(s) IV Push every 6 hours PRN  metoclopramide Injectable 10 milliGRAM(s) IV Push every 6 hours PRN  sodium chloride 0.9% lock flush 10 milliLiter(s) IV Push every 1 hour PRN  sodium chloride 0.9% lock flush 10 milliLiter(s) IV Push every 12 hours PRN      Vital Signs Last 24 Hrs  T(C): 36.8 (01 Nov 2018 06:01), Max: 37 (31 Oct 2018 17:47)  T(F): 98.3 (01 Nov 2018 06:01), Max: 98.6 (31 Oct 2018 17:47)  HR: 71 (01 Nov 2018 06:01) (71 - 108)  BP: 157/96 (01 Nov 2018 06:01) (120/87 - 157/96)  RR: 18 (01 Nov 2018 06:01) (18 - 18)  SpO2: 100% (01 Nov 2018 06:01) (97% - 100%)    PHYSICAL EXAM  General: adult in NAD  HEENT: clear oropharynx, anicteric sclera, pink conjunctiva  Neck: supple  CV: normal S1/S2 RRR  Lungs: positive air movement b/l ant lungs,clear to auscultation, no wheezes, no rales  Abdomen: soft non-tender non-distended, no hepatosplenomegaly  Ext: no clubbing cyanosis or edema  Skin: no rashes and no petechiae  Neuro: alert and oriented X 4, no focal deficits  Central Line: normal    LABS:    Cultures:     Culture - Blood (10.16.18 @ 13:21)    Specimen Source: .Blood Blood-Peripheral    Culture Results:   No growth at 5 days.    Culture - Blood (10.16.18 @ 13:20)    Specimen Source: .Blood PICC/PERC Double Lumen BLUE    Culture Results:   No growth at 5 days.                 8.0  2.0   )-----------( 39       ( 01 Nov 2018 06:57 )             23.8         Mean Cell Volume : 89.8 fl  Mean Cell Hemoglobin : 30.2 pg  Mean Cell Hemoglobin Concentration : 33.7 gm/dL  Auto Neutrophil # : x  Auto Lymphocyte # : x  Auto Monocyte # : x  Auto Eosinophil # : x  Auto Basophil # : x  Auto Neutrophil % : x  Auto Lymphocyte % : x  Auto Monocyte % : x  Auto Eosinophil % : x  Auto Basophil % : x      10-31    140  |  105  |  13  ----------------------------<  106<H>  4.2   |  27  |  0.60    Ca    10.2      31 Oct 2018 06:50  Phos  2.9     10-31  Mg     2.4     10-31    TPro  6.9  /  Alb  3.8  /  TBili  0.5  /  DBili  x   /  AST  12  /  ALT  16  /  AlkPhos  165<H>  10-31    PT/INR - ( 01 Nov 2018 06:57 )   PT: 10.2 sec;   INR: 0.90 ratio    PTT - ( 01 Nov 2018 06:57 )  PTT:30.1 sec      RADIOLOGY & ADDITIONAL STUDIES:    from: CT Head No Cont (10.23.18 @ 16:30)     IMPRESSION: No change in posterior interhemispheric subdural hemorrhage   and hemorrhage extending along the left tentorial leaf compared with 8:44   AM. No hydrocephalus. Mild cerebellar atrophy. Diagnosis: Relapsed ALL Ph (+)     Protocol/Chemo Regimen: S/p Cycle 1 Inotuzamab Day 1, 8 and 15    Day: 25    Pt endorsed: unchanged headaches - controlled with current pain regimen     Review of Systems: Denies nausea, vomiting, diarrhea, chest pain, SOB      Pain scale: 0    Diet: Regular    Allergies: No Known Drug Allergies, shellfish (Nausea; Vomiting)      ANTIMICROBIALS  meropenem  IVPB 1000 milliGRAM(s) IV Intermittent every 8 hours  micafungin IVPB 100 milliGRAM(s) IV Intermittent daily      STANDING MEDICATIONS  AQUAPHOR (petrolatum Ointment) 1 Application(s) Topical two times a day  influenza   Vaccine 0.5 milliLiter(s) IntraMuscular once  levETIRAcetam 500 milliGRAM(s) Oral two times a day  multivitamin 1 Tablet(s) Oral daily  pantoprazole    Tablet 40 milliGRAM(s) Oral before breakfast  sodium chloride 0.9% lock flush 20 milliLiter(s) IV Push once  sodium chloride 0.9%. 1000 milliLiter(s) IV Continuous <Continuous>      PRN MEDICATIONS  acetaminophen   Tablet .. 650 milliGRAM(s) Oral every 6 hours PRN  acetaminophen   Tablet .. 650 milliGRAM(s) Oral every 6 hours PRN  HYDROmorphone  Injectable 1 milliGRAM(s) IV Push every 4 hours PRN  HYDROmorphone  Injectable 0.5 milliGRAM(s) IV Push every 6 hours PRN  metoclopramide Injectable 10 milliGRAM(s) IV Push every 6 hours PRN  sodium chloride 0.9% lock flush 10 milliLiter(s) IV Push every 1 hour PRN  sodium chloride 0.9% lock flush 10 milliLiter(s) IV Push every 12 hours PRN      Vital Signs Last 24 Hrs  T(C): 36.8 (01 Nov 2018 06:01), Max: 37 (31 Oct 2018 17:47)  T(F): 98.3 (01 Nov 2018 06:01), Max: 98.6 (31 Oct 2018 17:47)  HR: 71 (01 Nov 2018 06:01) (71 - 108)  BP: 157/96 (01 Nov 2018 06:01) (120/87 - 157/96)  RR: 18 (01 Nov 2018 06:01) (18 - 18)  SpO2: 100% (01 Nov 2018 06:01) (97% - 100%)    PHYSICAL EXAM  General: adult in NAD  HEENT: clear oropharynx, anicteric sclera, pink conjunctiva  Neck: supple  CV: normal S1/S2 RRR  Lungs: positive air movement b/l ant lungs,clear to auscultation, no wheezes, no rales  Abdomen: soft non-tender non-distended, no hepatosplenomegaly  Ext: no clubbing cyanosis or edema  Skin: no rashes and no petechiae  Neuro: alert and oriented X 4, no focal deficits  Central Line: normal    LABS:    Cultures:     Culture - Blood (10.16.18 @ 13:21)    Specimen Source: .Blood Blood-Peripheral    Culture Results:   No growth at 5 days.    Culture - Blood (10.16.18 @ 13:20)    Specimen Source: .Blood PICC/PERC Double Lumen BLUE    Culture Results:   No growth at 5 days.                          8.0    2.0   )-----------( 39       ( 01 Nov 2018 06:57 )             23.8     01 Nov 2018 06:57    137    |  101    |  16     ----------------------------<  91     4.2     |  28     |  0.67     Ca    10.6       01 Nov 2018 06:57  Phos  2.7       01 Nov 2018 06:57  Mg     2.2       01 Nov 2018 06:57    TPro  6.8    /  Alb  3.7    /  TBili  0.4    /  DBili  x      /  AST  14     /  ALT  15     /  AlkPhos  166    01 Nov 2018 06:57    PT/INR - ( 01 Nov 2018 06:57 )   PT: 10.2 sec;   INR: 0.90 ratio    PTT - ( 01 Nov 2018 06:57 )  PTT:30.1 sec    LIVER FUNCTIONS - ( 01 Nov 2018 06:57 )  Alb: 3.7 g/dL / Pro: 6.8 g/dL / ALK PHOS: 166 U/L / ALT: 15 U/L / AST: 14 U/L / GGT: x             RADIOLOGY & ADDITIONAL STUDIES:    from: CT Head No Cont (10.23.18 @ 16:30)     IMPRESSION: No change in posterior interhemispheric subdural hemorrhage   and hemorrhage extending along the left tentorial leaf compared with 8:44   AM. No hydrocephalus. Mild cerebellar atrophy.

## 2018-11-02 LAB
ALBUMIN SERPL ELPH-MCNC: 3.8 G/DL — SIGNIFICANT CHANGE UP (ref 3.3–5)
ALP SERPL-CCNC: 160 U/L — HIGH (ref 40–120)
ALT FLD-CCNC: 17 U/L — SIGNIFICANT CHANGE UP (ref 10–45)
ANION GAP SERPL CALC-SCNC: 11 MMOL/L — SIGNIFICANT CHANGE UP (ref 5–17)
APPEARANCE UR: CLEAR — SIGNIFICANT CHANGE UP
AST SERPL-CCNC: 13 U/L — SIGNIFICANT CHANGE UP (ref 10–40)
BACTERIA # UR AUTO: NEGATIVE — SIGNIFICANT CHANGE UP
BASOPHILS # BLD AUTO: 0 K/UL — SIGNIFICANT CHANGE UP (ref 0–0.2)
BASOPHILS NFR BLD AUTO: 0.2 % — SIGNIFICANT CHANGE UP (ref 0–2)
BILIRUB SERPL-MCNC: 0.5 MG/DL — SIGNIFICANT CHANGE UP (ref 0.2–1.2)
BILIRUB UR-MCNC: NEGATIVE — SIGNIFICANT CHANGE UP
BLD GP AB SCN SERPL QL: POSITIVE — SIGNIFICANT CHANGE UP
BUN SERPL-MCNC: 16 MG/DL — SIGNIFICANT CHANGE UP (ref 7–23)
CALCIUM SERPL-MCNC: 10.6 MG/DL — HIGH (ref 8.4–10.5)
CHLORIDE SERPL-SCNC: 105 MMOL/L — SIGNIFICANT CHANGE UP (ref 96–108)
CO2 SERPL-SCNC: 25 MMOL/L — SIGNIFICANT CHANGE UP (ref 22–31)
COLOR SPEC: YELLOW — SIGNIFICANT CHANGE UP
CREAT SERPL-MCNC: 0.6 MG/DL — SIGNIFICANT CHANGE UP (ref 0.5–1.3)
DIFF PNL FLD: NEGATIVE — SIGNIFICANT CHANGE UP
EOSINOPHIL # BLD AUTO: 0 K/UL — SIGNIFICANT CHANGE UP (ref 0–0.5)
EOSINOPHIL NFR BLD AUTO: 1 % — SIGNIFICANT CHANGE UP (ref 0–6)
EPI CELLS # UR: 2 /HPF — SIGNIFICANT CHANGE UP
GLUCOSE SERPL-MCNC: 82 MG/DL — SIGNIFICANT CHANGE UP (ref 70–99)
GLUCOSE UR QL: NEGATIVE — SIGNIFICANT CHANGE UP
HCT VFR BLD CALC: 23.3 % — LOW (ref 34.5–45)
HGB BLD-MCNC: 8.2 G/DL — LOW (ref 11.5–15.5)
HYALINE CASTS # UR AUTO: 0 /LPF — SIGNIFICANT CHANGE UP (ref 0–2)
KETONES UR-MCNC: NEGATIVE — SIGNIFICANT CHANGE UP
LDH SERPL L TO P-CCNC: 204 U/L — SIGNIFICANT CHANGE UP (ref 50–242)
LEUKOCYTE ESTERASE UR-ACNC: NEGATIVE — SIGNIFICANT CHANGE UP
LYMPHOCYTES # BLD AUTO: 1.1 K/UL — SIGNIFICANT CHANGE UP (ref 1–3.3)
LYMPHOCYTES # BLD AUTO: 52.4 % — HIGH (ref 13–44)
MAGNESIUM SERPL-MCNC: 2.2 MG/DL — SIGNIFICANT CHANGE UP (ref 1.6–2.6)
MCHC RBC-ENTMCNC: 31.2 PG — SIGNIFICANT CHANGE UP (ref 27–34)
MCHC RBC-ENTMCNC: 34.9 GM/DL — SIGNIFICANT CHANGE UP (ref 32–36)
MCV RBC AUTO: 89.4 FL — SIGNIFICANT CHANGE UP (ref 80–100)
MONOCYTES # BLD AUTO: 0.4 K/UL — SIGNIFICANT CHANGE UP (ref 0–0.9)
MONOCYTES NFR BLD AUTO: 18.5 % — HIGH (ref 2–14)
NEUTROPHILS # BLD AUTO: 0.6 K/UL — LOW (ref 1.8–7.4)
NEUTROPHILS NFR BLD AUTO: 27.8 % — LOW (ref 43–77)
NITRITE UR-MCNC: NEGATIVE — SIGNIFICANT CHANGE UP
PH UR: 6 — SIGNIFICANT CHANGE UP (ref 5–8)
PHOSPHATE SERPL-MCNC: 2.8 MG/DL — SIGNIFICANT CHANGE UP (ref 2.5–4.5)
PLATELET # BLD AUTO: 41 K/UL — LOW (ref 150–400)
POTASSIUM SERPL-MCNC: 4.4 MMOL/L — SIGNIFICANT CHANGE UP (ref 3.5–5.3)
POTASSIUM SERPL-SCNC: 4.4 MMOL/L — SIGNIFICANT CHANGE UP (ref 3.5–5.3)
PROT SERPL-MCNC: 6.7 G/DL — SIGNIFICANT CHANGE UP (ref 6–8.3)
PROT UR-MCNC: ABNORMAL
RBC # BLD: 2.61 M/UL — LOW (ref 3.8–5.2)
RBC # FLD: 15.4 % — HIGH (ref 10.3–14.5)
RBC CASTS # UR COMP ASSIST: 2 /HPF — SIGNIFICANT CHANGE UP (ref 0–4)
RH IG SCN BLD-IMP: POSITIVE — SIGNIFICANT CHANGE UP
SODIUM SERPL-SCNC: 141 MMOL/L — SIGNIFICANT CHANGE UP (ref 135–145)
SP GR SPEC: 1.02 — SIGNIFICANT CHANGE UP (ref 1.01–1.02)
URATE SERPL-MCNC: 2.7 MG/DL — SIGNIFICANT CHANGE UP (ref 2.5–7)
UROBILINOGEN FLD QL: NEGATIVE — SIGNIFICANT CHANGE UP
WBC # BLD: 2.2 K/UL — LOW (ref 3.8–10.5)
WBC # FLD AUTO: 2.2 K/UL — LOW (ref 3.8–10.5)
WBC UR QL: 6 /HPF — HIGH (ref 0–5)

## 2018-11-02 PROCEDURE — 71045 X-RAY EXAM CHEST 1 VIEW: CPT | Mod: 26

## 2018-11-02 PROCEDURE — 93010 ELECTROCARDIOGRAM REPORT: CPT

## 2018-11-02 PROCEDURE — 99232 SBSQ HOSP IP/OBS MODERATE 35: CPT

## 2018-11-02 RX ORDER — CEFEPIME 1 G/1
2000 INJECTION, POWDER, FOR SOLUTION INTRAMUSCULAR; INTRAVENOUS EVERY 8 HOURS
Qty: 0 | Refills: 0 | Status: DISCONTINUED | OUTPATIENT
Start: 2018-11-02 | End: 2018-11-15

## 2018-11-02 RX ADMIN — SODIUM CHLORIDE 50 MILLILITER(S): 9 INJECTION INTRAMUSCULAR; INTRAVENOUS; SUBCUTANEOUS at 17:17

## 2018-11-02 RX ADMIN — HYDROMORPHONE HYDROCHLORIDE 1 MILLIGRAM(S): 2 INJECTION INTRAMUSCULAR; INTRAVENOUS; SUBCUTANEOUS at 06:04

## 2018-11-02 RX ADMIN — HYDROMORPHONE HYDROCHLORIDE 1 MILLIGRAM(S): 2 INJECTION INTRAMUSCULAR; INTRAVENOUS; SUBCUTANEOUS at 18:30

## 2018-11-02 RX ADMIN — HYDROMORPHONE HYDROCHLORIDE 1 MILLIGRAM(S): 2 INJECTION INTRAMUSCULAR; INTRAVENOUS; SUBCUTANEOUS at 18:14

## 2018-11-02 RX ADMIN — LEVETIRACETAM 500 MILLIGRAM(S): 250 TABLET, FILM COATED ORAL at 05:34

## 2018-11-02 RX ADMIN — Medication 1 APPLICATION(S): at 17:15

## 2018-11-02 RX ADMIN — PANTOPRAZOLE SODIUM 40 MILLIGRAM(S): 20 TABLET, DELAYED RELEASE ORAL at 05:34

## 2018-11-02 RX ADMIN — MEROPENEM 200 MILLIGRAM(S): 1 INJECTION INTRAVENOUS at 05:34

## 2018-11-02 RX ADMIN — Medication 650 MILLIGRAM(S): at 20:07

## 2018-11-02 RX ADMIN — Medication 10 MILLIGRAM(S): at 19:57

## 2018-11-02 RX ADMIN — MICAFUNGIN SODIUM 105 MILLIGRAM(S): 100 INJECTION, POWDER, LYOPHILIZED, FOR SOLUTION INTRAVENOUS at 12:29

## 2018-11-02 RX ADMIN — CEFEPIME 100 MILLIGRAM(S): 1 INJECTION, POWDER, FOR SOLUTION INTRAMUSCULAR; INTRAVENOUS at 13:57

## 2018-11-02 RX ADMIN — Medication 1 TABLET(S): at 12:30

## 2018-11-02 RX ADMIN — HYDROMORPHONE HYDROCHLORIDE 1 MILLIGRAM(S): 2 INJECTION INTRAMUSCULAR; INTRAVENOUS; SUBCUTANEOUS at 14:30

## 2018-11-02 RX ADMIN — HYDROMORPHONE HYDROCHLORIDE 1 MILLIGRAM(S): 2 INJECTION INTRAMUSCULAR; INTRAVENOUS; SUBCUTANEOUS at 22:18

## 2018-11-02 RX ADMIN — CEFEPIME 100 MILLIGRAM(S): 1 INJECTION, POWDER, FOR SOLUTION INTRAMUSCULAR; INTRAVENOUS at 21:46

## 2018-11-02 RX ADMIN — HYDROMORPHONE HYDROCHLORIDE 1 MILLIGRAM(S): 2 INJECTION INTRAMUSCULAR; INTRAVENOUS; SUBCUTANEOUS at 14:11

## 2018-11-02 RX ADMIN — LEVETIRACETAM 500 MILLIGRAM(S): 250 TABLET, FILM COATED ORAL at 17:15

## 2018-11-02 RX ADMIN — HYDROMORPHONE HYDROCHLORIDE 1 MILLIGRAM(S): 2 INJECTION INTRAMUSCULAR; INTRAVENOUS; SUBCUTANEOUS at 02:04

## 2018-11-02 RX ADMIN — HYDROMORPHONE HYDROCHLORIDE 1 MILLIGRAM(S): 2 INJECTION INTRAMUSCULAR; INTRAVENOUS; SUBCUTANEOUS at 22:32

## 2018-11-02 RX ADMIN — Medication 1 APPLICATION(S): at 05:35

## 2018-11-02 RX ADMIN — HYDROMORPHONE HYDROCHLORIDE 1 MILLIGRAM(S): 2 INJECTION INTRAMUSCULAR; INTRAVENOUS; SUBCUTANEOUS at 09:51

## 2018-11-02 RX ADMIN — HYDROMORPHONE HYDROCHLORIDE 1 MILLIGRAM(S): 2 INJECTION INTRAMUSCULAR; INTRAVENOUS; SUBCUTANEOUS at 10:10

## 2018-11-02 RX ADMIN — HYDROMORPHONE HYDROCHLORIDE 1 MILLIGRAM(S): 2 INJECTION INTRAMUSCULAR; INTRAVENOUS; SUBCUTANEOUS at 05:34

## 2018-11-02 RX ADMIN — HYDROMORPHONE HYDROCHLORIDE 1 MILLIGRAM(S): 2 INJECTION INTRAMUSCULAR; INTRAVENOUS; SUBCUTANEOUS at 01:34

## 2018-11-02 NOTE — PROGRESS NOTE ADULT - PROBLEM SELECTOR PLAN 2
Patient is neutropenic, afebrile   If febrile Pan Cx and CXR  Continue Mycamine for ppx (No azoles with Inotuzamab) and Meropenem   10/16 CT C/A/P with Scattered and patchy ground-glass opacities in the right upper and lower lobes, possibly infection   ID following Patient is neutropenic, afebrile   If febrile Pan Cx and CXR  Continue Mycamine for ppx (No azoles with Inotuzamab) and Cefepime   10/16 CT C/A/P with Scattered and patchy ground-glass opacities in the right upper and lower lobes, possibly infection   ID following

## 2018-11-02 NOTE — PROGRESS NOTE ADULT - ATTENDING COMMENTS
58F Ph(+) ALL s/p R HyperCVAD x 4 cycles IT MTX x2 s/p stem cell collection w/ Step 1(HD vp16 plus bernardino-c) regimen.  s/p Masha-Auto on 12/16/16. Pt was on Sprycel maintenance. 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 Has completed first cycle Inotuzumab. Headaches improved, but still present. No accompanying symptoms.  Cont Keppra prophylaxis.  Repeat head CT showed small re-bleed. Follow up repeat CT stable. To resume Inotuzumab on Monday.  -GI bleed has resolved, Continue po protonix.  -Afebrile today. CT c/a/p 10/16- Scattered and patchy ground glass opacities in the right upper and lower lobes, possibly infection.  Heterogeneous thickening of the endometrial cavity.  Continue vancomycin, meropenem, Mycamine.    - goal plt ct >80K, but platelets are refractory to transfusion- transfuse 1/2 units over 3 hours twice a day. Keep hb >7. Obtain HLA matched platelets as possible.  - OOB, ambulation.  Cycle 2 Inotuzumab due next week. 58F Ph(+) ALL s/p R HyperCVAD x 4 cycles IT MTX x2 s/p stem cell collection w/ Step 1(HD vp16 plus bernardino-c) regimen.  s/p Masha-Auto on 12/16/16. Pt was on Sprycel maintenance. 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 Has completed first cycle Inotuzumab. Headaches less, but still present. No accompanying symptoms.  Cont Keppra prophylaxis.  Repeat head CT showed small re-bleed. Follow up repeat CT stable. To resume Inotuzumab on Monday.  -GI bleed has resolved, Continue po protonix.  -Afebrile today. CT c/a/p 10/16- Scattered and patchy ground glass opacities in the right upper and lower lobes, possibly infection.  Heterogeneous thickening of the endometrial cavity.  Continue vancomycin, meropenem, Mycamine.    - goal plt ct >80K, but platelets are refractory to transfusion- transfuse 1/2 units over 3 hours twice a day. Keep hb >7. Obtain HLA matched platelets as possible.  - OOB, ambulation.  Cycle 2 Inotuzumab due next week.

## 2018-11-02 NOTE — PROGRESS NOTE ADULT - SUBJECTIVE AND OBJECTIVE BOX
Diagnosis: Relapsed ALL Ph (+)     Protocol/Chemo Regimen: S/p Cycle 1 Inotuzamab Day 1, 8 and 15    Day: 26    Pt endorsed: unchanged headaches - controlled with current pain regimen     Review of Systems: Denies nausea, vomiting, diarrhea, chest pain, SOB      Pain scale: 0    Diet: Regular    Allergies: No Known Drug Allergies, shellfish (Nausea; Vomiting)    ------------------- Diagnosis: Relapsed ALL Ph (+)     Protocol/Chemo Regimen: S/p Cycle 1 Inotuzamab Day 1, 8 and 15    Day: 26    Pt endorsed: unchanged headaches - controlled with current pain regimen     Review of Systems: Denies nausea, vomiting, diarrhea, chest pain, SOB      Pain scale: 0    Diet: Regular    Allergies: No Known Drug Allergies, shellfish (Nausea; Vomiting)    ANTIMICROBIALS  meropenem  IVPB 1000 milliGRAM(s) IV Intermittent every 8 hours  micafungin IVPB 100 milliGRAM(s) IV Intermittent daily    STANDING MEDICATIONS  AQUAPHOR (petrolatum Ointment) 1 Application(s) Topical two times a day  influenza   Vaccine 0.5 milliLiter(s) IntraMuscular once  levETIRAcetam 500 milliGRAM(s) Oral two times a day  multivitamin 1 Tablet(s) Oral daily  pantoprazole    Tablet 40 milliGRAM(s) Oral before breakfast  sodium chloride 0.9% lock flush 20 milliLiter(s) IV Push once  sodium chloride 0.9%. 1000 milliLiter(s) IV Continuous <Continuous>    PRN MEDICATIONS  acetaminophen   Tablet .. 650 milliGRAM(s) Oral every 6 hours PRN  acetaminophen   Tablet .. 650 milliGRAM(s) Oral every 6 hours PRN  HYDROmorphone  Injectable 1 milliGRAM(s) IV Push every 4 hours PRN  HYDROmorphone  Injectable 0.5 milliGRAM(s) IV Push every 6 hours PRN  metoclopramide Injectable 10 milliGRAM(s) IV Push every 6 hours PRN  sodium chloride 0.9% lock flush 10 milliLiter(s) IV Push every 1 hour PRN  sodium chloride 0.9% lock flush 10 milliLiter(s) IV Push every 12 hours PRN    Vital Signs Last 24 Hrs  T(C): 36.7 (02 Nov 2018 05:41), Max: 37.3 (02 Nov 2018 02:00)  T(F): 98.1 (02 Nov 2018 05:41), Max: 99.2 (02 Nov 2018 02:00)  HR: 72 (02 Nov 2018 05:41) (72 - 89)  BP: 136/84 (02 Nov 2018 05:41) (122/83 - 150/93)  BP(mean): --  RR: 18 (02 Nov 2018 05:41) (16 - 18)  SpO2: 97% (02 Nov 2018 05:41) (97% - 100%)    PHYSICAL EXAM  General: adult in NAD  HEENT: clear oropharynx, no erythema, no ulcers  Neck: supple  CV: normal S1, S2, RRR  Lungs: clear to auscultation, no wheezes, no rales  Abdomen: soft, nontender, nondistended, normal BS  Ext: no edema  Skin: no rashes  Neuro: alert and oriented x 3  Central line: normal     LABS:                        8.2    2.2   )-----------( 41       ( 02 Nov 2018 06:52 )             23.3     Mean Cell Volume : 89.4 fl  Mean Cell Hemoglobin : 31.2 pg  Mean Cell Hemoglobin Concentration : 34.9 gm/dL  Auto Neutrophil # : 0.6 K/uL  Auto Lymphocyte # : 1.1 K/uL  Auto Monocyte # : 0.4 K/uL  Auto Eosinophil # : 0.0 K/uL  Auto Basophil # : 0.0 K/uL  Auto Neutrophil % : 27.8 %  Auto Lymphocyte % : 52.4 %  Auto Monocyte % : 18.5 %  Auto Eosinophil % : 1.0 %  Auto Basophil % : 0.2 %    11-02  141  |  105  |  16  ----------------------------<  82  4.4   |  25  |  0.60    Ca    10.6<H>      02 Nov 2018 06:52  Phos  2.8     11-02  Mg     2.2     11-02    TPro  6.7  /  Alb  3.8  /  TBili  0.5  /  DBili  x   /  AST  13  /  ALT  17  /  AlkPhos  160<H>  11-02    Mg 2.2  Phos 2.8    PT/INR - ( 01 Nov 2018 06:57 )   PT: 10.2 sec;   INR: 0.90 ratio       PTT - ( 01 Nov 2018 06:57 )  PTT:30.1 sec    Uric Acid 2.7    RADIOLOGY & ADDITIONAL STUDIES:    < from: CT Head No Cont (10.23.18 @ 16:30) >  IMPRESSION: No change in posterior interhemispheric subdural hemorrhage   and hemorrhage extending along the left tentorial leaf compared with 8:44   AM. No hydrocephalus. Mild cerebellar atrophy.

## 2018-11-02 NOTE — PROGRESS NOTE ADULT - PROBLEM SELECTOR PLAN 4
No pharmacologic ppx 2/2 thrombocytopenia and SDH          Contact Information [phone] No pharmacologic ppx 2/2 thrombocytopenia and SDH           Contact Information [phone]

## 2018-11-02 NOTE — PROGRESS NOTE ADULT - SUBJECTIVE AND OBJECTIVE BOX
58y old  Female who presents with a chief complaint of Headache/ r/o relapse ALL (02 Nov 2018 07:21)      Interval history:  Afebrile, feels well, resolved mucositis, no abdominal pain.     No Known Drug Allergies  shellfish (Nausea; Vomiting)    Antimicrobials:  cefepime   IVPB 2000 milliGRAM(s) IV Intermittent every 8 hours  micafungin IVPB 100 milliGRAM(s) IV Intermittent daily      REVIEW OF SYSTEMS:  No chest pain  No cough, no SOB  No N/V/D, no abdominal pain  No dysuria or frequency  No rash.       Vital Signs Last 24 Hrs  T(C): 36.7 (11-02-18 @ 10:00), Max: 37.3 (11-02-18 @ 02:00)  T(F): 98 (11-02-18 @ 10:00), Max: 99.2 (11-02-18 @ 02:00)  HR: 77 (11-02-18 @ 10:00) (72 - 82)  BP: 143/82 (11-02-18 @ 10:00) (131/87 - 150/93)  RR: 18 (11-02-18 @ 10:00) (16 - 18)  SpO2: 100% (11-02-18 @ 10:00) (97% - 100%)      PHYSICAL EXAM:  Patient in no acute distress. AAOX3.  No icterus, no oral ulcers.  Cardiovascular: S1S2 normal.  Lungs: Good air entry B/L lung fields.  Gastrointestinal: soft, nontender, nondistended.  Extremities: no edema.  rt arm picc                              8.2    2.2   )-----------( 41       ( 02 Nov 2018 06:52 )             23.3   11-02    141  |  105  |  16  ----------------------------<  82  4.4   |  25  |  0.60    Ca    10.6<H>      02 Nov 2018 06:52  Phos  2.8     11-02  Mg     2.2     11-02    TPro  6.7  /  Alb  3.8  /  TBili  0.5  /  DBili  x   /  AST  13  /  ALT  17  /  AlkPhos  160<H>  11-02      LIVER FUNCTIONS - ( 02 Nov 2018 06:52 )  Alb: 3.8 g/dL / Pro: 6.7 g/dL / ALK PHOS: 160 U/L / ALT: 17 U/L / AST: 13 U/L / GGT: x             Culture - Blood (10.16.18 @ 13:21)    Specimen Source: .Blood Blood-Peripheral    Culture Results:   No growth at 5 days.

## 2018-11-03 LAB
ALBUMIN SERPL ELPH-MCNC: 4 G/DL — SIGNIFICANT CHANGE UP (ref 3.3–5)
ALP SERPL-CCNC: 158 U/L — HIGH (ref 40–120)
ALT FLD-CCNC: 15 U/L — SIGNIFICANT CHANGE UP (ref 10–45)
ANION GAP SERPL CALC-SCNC: 10 MMOL/L — SIGNIFICANT CHANGE UP (ref 5–17)
ANTIBODY ID 1_1: SIGNIFICANT CHANGE UP
AST SERPL-CCNC: 12 U/L — SIGNIFICANT CHANGE UP (ref 10–40)
BASOPHILS # BLD AUTO: 0 K/UL — SIGNIFICANT CHANGE UP (ref 0–0.2)
BASOPHILS NFR BLD AUTO: 0.4 % — SIGNIFICANT CHANGE UP (ref 0–2)
BILIRUB SERPL-MCNC: 0.6 MG/DL — SIGNIFICANT CHANGE UP (ref 0.2–1.2)
BUN SERPL-MCNC: 14 MG/DL — SIGNIFICANT CHANGE UP (ref 7–23)
CALCIUM SERPL-MCNC: 10.3 MG/DL — SIGNIFICANT CHANGE UP (ref 8.4–10.5)
CHLORIDE SERPL-SCNC: 103 MMOL/L — SIGNIFICANT CHANGE UP (ref 96–108)
CO2 SERPL-SCNC: 24 MMOL/L — SIGNIFICANT CHANGE UP (ref 22–31)
CREAT SERPL-MCNC: 0.62 MG/DL — SIGNIFICANT CHANGE UP (ref 0.5–1.3)
DAT POLY-SP REAG RBC QL: POSITIVE — SIGNIFICANT CHANGE UP
DIRECT COOMBS IGG: NEGATIVE — SIGNIFICANT CHANGE UP
EOSINOPHIL # BLD AUTO: 0 K/UL — SIGNIFICANT CHANGE UP (ref 0–0.5)
EOSINOPHIL NFR BLD AUTO: 0.3 % — SIGNIFICANT CHANGE UP (ref 0–6)
GLUCOSE SERPL-MCNC: 130 MG/DL — HIGH (ref 70–99)
GRAM STN FLD: SIGNIFICANT CHANGE UP
HCT VFR BLD CALC: 24.7 % — LOW (ref 34.5–45)
HGB BLD-MCNC: 8.7 G/DL — LOW (ref 11.5–15.5)
LDH SERPL L TO P-CCNC: 203 U/L — SIGNIFICANT CHANGE UP (ref 50–242)
LYMPHOCYTES # BLD AUTO: 0.4 K/UL — LOW (ref 1–3.3)
LYMPHOCYTES # BLD AUTO: 9.9 % — LOW (ref 13–44)
MAGNESIUM SERPL-MCNC: 2 MG/DL — SIGNIFICANT CHANGE UP (ref 1.6–2.6)
MCHC RBC-ENTMCNC: 31.4 PG — SIGNIFICANT CHANGE UP (ref 27–34)
MCHC RBC-ENTMCNC: 35.1 GM/DL — SIGNIFICANT CHANGE UP (ref 32–36)
MCV RBC AUTO: 89.4 FL — SIGNIFICANT CHANGE UP (ref 80–100)
MONOCYTES # BLD AUTO: 0.6 K/UL — SIGNIFICANT CHANGE UP (ref 0–0.9)
MONOCYTES NFR BLD AUTO: 16.1 % — HIGH (ref 2–14)
NEUTROPHILS # BLD AUTO: 2.9 K/UL — SIGNIFICANT CHANGE UP (ref 1.8–7.4)
NEUTROPHILS NFR BLD AUTO: 73.3 % — SIGNIFICANT CHANGE UP (ref 43–77)
PHOSPHATE SERPL-MCNC: 2.1 MG/DL — LOW (ref 2.5–4.5)
PLATELET # BLD AUTO: 30 K/UL — LOW (ref 150–400)
POTASSIUM SERPL-MCNC: 4.1 MMOL/L — SIGNIFICANT CHANGE UP (ref 3.5–5.3)
POTASSIUM SERPL-SCNC: 4.1 MMOL/L — SIGNIFICANT CHANGE UP (ref 3.5–5.3)
PROT SERPL-MCNC: 6.8 G/DL — SIGNIFICANT CHANGE UP (ref 6–8.3)
RBC # BLD: 2.76 M/UL — LOW (ref 3.8–5.2)
RBC # FLD: 16.3 % — HIGH (ref 10.3–14.5)
SODIUM SERPL-SCNC: 137 MMOL/L — SIGNIFICANT CHANGE UP (ref 135–145)
SPECIMEN SOURCE: SIGNIFICANT CHANGE UP
URATE SERPL-MCNC: 3 MG/DL — SIGNIFICANT CHANGE UP (ref 2.5–7)
WBC # BLD: 4 K/UL — SIGNIFICANT CHANGE UP (ref 3.8–10.5)
WBC # FLD AUTO: 4 K/UL — SIGNIFICANT CHANGE UP (ref 3.8–10.5)

## 2018-11-03 PROCEDURE — 70450 CT HEAD/BRAIN W/O DYE: CPT | Mod: 26

## 2018-11-03 PROCEDURE — 99232 SBSQ HOSP IP/OBS MODERATE 35: CPT

## 2018-11-03 RX ORDER — VANCOMYCIN HCL 1 G
1000 VIAL (EA) INTRAVENOUS ONCE
Qty: 0 | Refills: 0 | Status: COMPLETED | OUTPATIENT
Start: 2018-11-03 | End: 2018-11-03

## 2018-11-03 RX ORDER — HYDROMORPHONE HYDROCHLORIDE 2 MG/ML
0.5 INJECTION INTRAMUSCULAR; INTRAVENOUS; SUBCUTANEOUS EVERY 4 HOURS
Qty: 0 | Refills: 0 | Status: DISCONTINUED | OUTPATIENT
Start: 2018-11-03 | End: 2018-11-08

## 2018-11-03 RX ORDER — HYDROMORPHONE HYDROCHLORIDE 2 MG/ML
1 INJECTION INTRAMUSCULAR; INTRAVENOUS; SUBCUTANEOUS ONCE
Qty: 0 | Refills: 0 | Status: DISCONTINUED | OUTPATIENT
Start: 2018-11-03 | End: 2018-11-03

## 2018-11-03 RX ORDER — POTASSIUM PHOSPHATE, MONOBASIC POTASSIUM PHOSPHATE, DIBASIC 236; 224 MG/ML; MG/ML
15 INJECTION, SOLUTION INTRAVENOUS ONCE
Qty: 0 | Refills: 0 | Status: COMPLETED | OUTPATIENT
Start: 2018-11-03 | End: 2018-11-03

## 2018-11-03 RX ORDER — HYDROMORPHONE HYDROCHLORIDE 2 MG/ML
1.5 INJECTION INTRAMUSCULAR; INTRAVENOUS; SUBCUTANEOUS EVERY 4 HOURS
Qty: 0 | Refills: 0 | Status: DISCONTINUED | OUTPATIENT
Start: 2018-11-03 | End: 2018-11-08

## 2018-11-03 RX ADMIN — Medication 650 MILLIGRAM(S): at 02:28

## 2018-11-03 RX ADMIN — HYDROMORPHONE HYDROCHLORIDE 1 MILLIGRAM(S): 2 INJECTION INTRAMUSCULAR; INTRAVENOUS; SUBCUTANEOUS at 02:51

## 2018-11-03 RX ADMIN — CEFEPIME 100 MILLIGRAM(S): 1 INJECTION, POWDER, FOR SOLUTION INTRAMUSCULAR; INTRAVENOUS at 21:21

## 2018-11-03 RX ADMIN — HYDROMORPHONE HYDROCHLORIDE 0.5 MILLIGRAM(S): 2 INJECTION INTRAMUSCULAR; INTRAVENOUS; SUBCUTANEOUS at 00:57

## 2018-11-03 RX ADMIN — Medication 10 MILLIGRAM(S): at 12:11

## 2018-11-03 RX ADMIN — SODIUM CHLORIDE 50 MILLILITER(S): 9 INJECTION INTRAMUSCULAR; INTRAVENOUS; SUBCUTANEOUS at 15:17

## 2018-11-03 RX ADMIN — POTASSIUM PHOSPHATE, MONOBASIC POTASSIUM PHOSPHATE, DIBASIC 62.5 MILLIMOLE(S): 236; 224 INJECTION, SOLUTION INTRAVENOUS at 17:54

## 2018-11-03 RX ADMIN — HYDROMORPHONE HYDROCHLORIDE 1.5 MILLIGRAM(S): 2 INJECTION INTRAMUSCULAR; INTRAVENOUS; SUBCUTANEOUS at 21:30

## 2018-11-03 RX ADMIN — HYDROMORPHONE HYDROCHLORIDE 1.5 MILLIGRAM(S): 2 INJECTION INTRAMUSCULAR; INTRAVENOUS; SUBCUTANEOUS at 17:03

## 2018-11-03 RX ADMIN — Medication 250 MILLIGRAM(S): at 23:31

## 2018-11-03 RX ADMIN — HYDROMORPHONE HYDROCHLORIDE 1.5 MILLIGRAM(S): 2 INJECTION INTRAMUSCULAR; INTRAVENOUS; SUBCUTANEOUS at 21:20

## 2018-11-03 RX ADMIN — HYDROMORPHONE HYDROCHLORIDE 1 MILLIGRAM(S): 2 INJECTION INTRAMUSCULAR; INTRAVENOUS; SUBCUTANEOUS at 06:31

## 2018-11-03 RX ADMIN — HYDROMORPHONE HYDROCHLORIDE 1 MILLIGRAM(S): 2 INJECTION INTRAMUSCULAR; INTRAVENOUS; SUBCUTANEOUS at 06:46

## 2018-11-03 RX ADMIN — CEFEPIME 100 MILLIGRAM(S): 1 INJECTION, POWDER, FOR SOLUTION INTRAMUSCULAR; INTRAVENOUS at 15:14

## 2018-11-03 RX ADMIN — SODIUM CHLORIDE 50 MILLILITER(S): 9 INJECTION INTRAMUSCULAR; INTRAVENOUS; SUBCUTANEOUS at 17:50

## 2018-11-03 RX ADMIN — HYDROMORPHONE HYDROCHLORIDE 1 MILLIGRAM(S): 2 INJECTION INTRAMUSCULAR; INTRAVENOUS; SUBCUTANEOUS at 15:10

## 2018-11-03 RX ADMIN — Medication 1 APPLICATION(S): at 17:55

## 2018-11-03 RX ADMIN — LEVETIRACETAM 500 MILLIGRAM(S): 250 TABLET, FILM COATED ORAL at 17:52

## 2018-11-03 RX ADMIN — HYDROMORPHONE HYDROCHLORIDE 0.5 MILLIGRAM(S): 2 INJECTION INTRAMUSCULAR; INTRAVENOUS; SUBCUTANEOUS at 12:04

## 2018-11-03 RX ADMIN — HYDROMORPHONE HYDROCHLORIDE 0.5 MILLIGRAM(S): 2 INJECTION INTRAMUSCULAR; INTRAVENOUS; SUBCUTANEOUS at 01:15

## 2018-11-03 RX ADMIN — Medication 10 MILLIGRAM(S): at 01:07

## 2018-11-03 RX ADMIN — SODIUM CHLORIDE 50 MILLILITER(S): 9 INJECTION INTRAMUSCULAR; INTRAVENOUS; SUBCUTANEOUS at 05:15

## 2018-11-03 RX ADMIN — Medication 1 TABLET(S): at 15:16

## 2018-11-03 RX ADMIN — MICAFUNGIN SODIUM 105 MILLIGRAM(S): 100 INJECTION, POWDER, LYOPHILIZED, FOR SOLUTION INTRAVENOUS at 12:09

## 2018-11-03 RX ADMIN — Medication 1 APPLICATION(S): at 05:13

## 2018-11-03 RX ADMIN — HYDROMORPHONE HYDROCHLORIDE 1 MILLIGRAM(S): 2 INJECTION INTRAMUSCULAR; INTRAVENOUS; SUBCUTANEOUS at 12:42

## 2018-11-03 RX ADMIN — HYDROMORPHONE HYDROCHLORIDE 1 MILLIGRAM(S): 2 INJECTION INTRAMUSCULAR; INTRAVENOUS; SUBCUTANEOUS at 02:36

## 2018-11-03 RX ADMIN — LEVETIRACETAM 500 MILLIGRAM(S): 250 TABLET, FILM COATED ORAL at 05:14

## 2018-11-03 RX ADMIN — HYDROMORPHONE HYDROCHLORIDE 1.5 MILLIGRAM(S): 2 INJECTION INTRAMUSCULAR; INTRAVENOUS; SUBCUTANEOUS at 14:41

## 2018-11-03 RX ADMIN — HYDROMORPHONE HYDROCHLORIDE 0.5 MILLIGRAM(S): 2 INJECTION INTRAMUSCULAR; INTRAVENOUS; SUBCUTANEOUS at 17:00

## 2018-11-03 RX ADMIN — CEFEPIME 100 MILLIGRAM(S): 1 INJECTION, POWDER, FOR SOLUTION INTRAMUSCULAR; INTRAVENOUS at 05:14

## 2018-11-03 RX ADMIN — HYDROMORPHONE HYDROCHLORIDE 0.5 MILLIGRAM(S): 2 INJECTION INTRAMUSCULAR; INTRAVENOUS; SUBCUTANEOUS at 17:49

## 2018-11-03 RX ADMIN — PANTOPRAZOLE SODIUM 40 MILLIGRAM(S): 20 TABLET, DELAYED RELEASE ORAL at 05:14

## 2018-11-03 RX ADMIN — HYDROMORPHONE HYDROCHLORIDE 0.5 MILLIGRAM(S): 2 INJECTION INTRAMUSCULAR; INTRAVENOUS; SUBCUTANEOUS at 10:18

## 2018-11-03 NOTE — CHART NOTE - NSCHARTNOTEFT_GEN_A_CORE
Called by RN with critical value result/ Blood culture dated 11/2/2018 preliminary result " Gram + cocco in clusters in anaerobic bottle".  Vanco 1 gm IV x 1 ordered.  RN made aware.  F/u with primary team in am.    Balbina Erickson, ANP ext [phone]

## 2018-11-03 NOTE — PROGRESS NOTE ADULT - SUBJECTIVE AND OBJECTIVE BOX
Diagnosis: Relapsed ALL Ph (+)     Protocol/Chemo Regimen: S/p Cycle 1 Inotuzamab Day 1, 8 and 15    Day: 27    Pt endorsed: unchanged headaches - controlled with current pain regimen     Review of Systems: Denies nausea, vomiting, diarrhea, chest pain, SOB      Pain scale: 0    Diet: Regular    Allergies: No Known Drug Allergies, shellfish (Nausea; Vomiting)    ANTIMICROBIALS  cefepime   IVPB 2000 milliGRAM(s) IV Intermittent every 8 hours  micafungin IVPB 100 milliGRAM(s) IV Intermittent daily    STANDING MEDICATIONS  AQUAPHOR (petrolatum Ointment) 1 Application(s) Topical two times a day  influenza   Vaccine 0.5 milliLiter(s) IntraMuscular once  levETIRAcetam 500 milliGRAM(s) Oral two times a day  multivitamin 1 Tablet(s) Oral daily  pantoprazole    Tablet 40 milliGRAM(s) Oral before breakfast  potassium phosphate IVPB 15 milliMole(s) IV Intermittent once  sodium chloride 0.9% lock flush 20 milliLiter(s) IV Push once  sodium chloride 0.9%. 1000 milliLiter(s) IV Continuous <Continuous>    PRN MEDICATIONS  acetaminophen   Tablet .. 650 milliGRAM(s) Oral every 6 hours PRN  acetaminophen   Tablet .. 650 milliGRAM(s) Oral every 6 hours PRN  HYDROmorphone  Injectable 1 milliGRAM(s) IV Push every 4 hours PRN  HYDROmorphone  Injectable 0.5 milliGRAM(s) IV Push every 6 hours PRN  metoclopramide Injectable 10 milliGRAM(s) IV Push every 6 hours PRN  sodium chloride 0.9% lock flush 10 milliLiter(s) IV Push every 1 hour PRN  sodium chloride 0.9% lock flush 10 milliLiter(s) IV Push every 12 hours PRN    Vital Signs Last 24 Hrs  T(C): 37.5 (03 Nov 2018 09:11), Max: 38.6 (02 Nov 2018 20:02)  T(F): 99.5 (03 Nov 2018 09:11), Max: 101.4 (02 Nov 2018 20:02)  HR: 100 (03 Nov 2018 09:11) (78 - 128)  BP: 134/76 (03 Nov 2018 09:11) (100/59 - 156/90)  BP(mean): --  RR: 18 (03 Nov 2018 09:11) (16 - 18)  SpO2: 99% (03 Nov 2018 09:11) (97% - 100%)    PHYSICAL EXAM  General: adult in NAD  HEENT: clear oropharynx, no erythema, no ulcers  Neck: supple  CV: normal S1, S2, RRR  Lungs: clear to auscultation, no wheezes, no rales  Abdomen: soft, nontender, nondistended, normal BS  Ext: no edema  Skin: no rashes  Neuro: alert and oriented x 3  Central line: normal     LABS:                        8.7    4.0   )-----------( 30       ( 03 Nov 2018 06:53 )             24.7     Mean Cell Volume : 89.4 fl  Mean Cell Hemoglobin : 31.4 pg  Mean Cell Hemoglobin Concentration : 35.1 gm/dL  Auto Neutrophil # : 2.9 K/uL  Auto Lymphocyte # : 0.4 K/uL  Auto Monocyte # : 0.6 K/uL  Auto Eosinophil # : 0.0 K/uL  Auto Basophil # : 0.0 K/uL  Auto Neutrophil % : 73.3 %  Auto Lymphocyte % : 9.9 %  Auto Monocyte % : 16.1 %  Auto Eosinophil % : 0.3 %  Auto Basophil % : 0.4 %    11-03  137  |  103  |  14  ----------------------------<  130<H>  4.1   |  24  |  0.62    Ca    10.3      03 Nov 2018 06:53  Phos  2.1     11-03  Mg     2.0     11-03    TPro  6.8  /  Alb  4.0  /  TBili  0.6  /  DBili  x   /  AST  12  /  ALT  15  /  AlkPhos  158<H>  11-03    Mg 2.0  Phos 2.1      Uric Acid 3.0    RADIOLOGY & ADDITIONAL STUDIES:    < from: CT Head No Cont (10.23.18 @ 16:30) >  IMPRESSION: No change in posterior interhemispheric subdural hemorrhage   and hemorrhage extending along the left tentorial leaf compared with 8:44   AM. No hydrocephalus. Mild cerebellar atrophy. Diagnosis: Relapsed ALL Ph (+)     Protocol/Chemo Regimen: S/p Cycle 1 Inotuzamab Day 1, 8 and 15    Day: 27    Pt endorsed: increased headaches, stating current dilaudid dose not relieving pain     Review of Systems: Denies nausea, vomiting, diarrhea, chest pain, SOB      Pain scale: 0    Diet: Regular    Allergies: No Known Drug Allergies, shellfish (Nausea; Vomiting)    ANTIMICROBIALS  cefepime   IVPB 2000 milliGRAM(s) IV Intermittent every 8 hours  micafungin IVPB 100 milliGRAM(s) IV Intermittent daily    STANDING MEDICATIONS  AQUAPHOR (petrolatum Ointment) 1 Application(s) Topical two times a day  influenza   Vaccine 0.5 milliLiter(s) IntraMuscular once  levETIRAcetam 500 milliGRAM(s) Oral two times a day  multivitamin 1 Tablet(s) Oral daily  pantoprazole    Tablet 40 milliGRAM(s) Oral before breakfast  potassium phosphate IVPB 15 milliMole(s) IV Intermittent once  sodium chloride 0.9% lock flush 20 milliLiter(s) IV Push once  sodium chloride 0.9%. 1000 milliLiter(s) IV Continuous <Continuous>    PRN MEDICATIONS  acetaminophen   Tablet .. 650 milliGRAM(s) Oral every 6 hours PRN  acetaminophen   Tablet .. 650 milliGRAM(s) Oral every 6 hours PRN  HYDROmorphone  Injectable 1 milliGRAM(s) IV Push every 4 hours PRN  HYDROmorphone  Injectable 0.5 milliGRAM(s) IV Push every 6 hours PRN  metoclopramide Injectable 10 milliGRAM(s) IV Push every 6 hours PRN  sodium chloride 0.9% lock flush 10 milliLiter(s) IV Push every 1 hour PRN  sodium chloride 0.9% lock flush 10 milliLiter(s) IV Push every 12 hours PRN    Vital Signs Last 24 Hrs  T(C): 37.5 (03 Nov 2018 09:11), Max: 38.6 (02 Nov 2018 20:02)  T(F): 99.5 (03 Nov 2018 09:11), Max: 101.4 (02 Nov 2018 20:02)  HR: 100 (03 Nov 2018 09:11) (78 - 128)  BP: 134/76 (03 Nov 2018 09:11) (100/59 - 156/90)  BP(mean): --  RR: 18 (03 Nov 2018 09:11) (16 - 18)  SpO2: 99% (03 Nov 2018 09:11) (97% - 100%)    PHYSICAL EXAM  General: adult in NAD  HEENT: clear oropharynx, no erythema, no ulcers  Neck: supple  CV: normal S1, S2, RRR  Lungs: clear to auscultation, no wheezes, no rales  Abdomen: soft, nontender, nondistended, normal BS  Ext: no edema  Skin: no rashes  Neuro: alert and oriented x 3  Central line: normal     LABS:                        8.7    4.0   )-----------( 30       ( 03 Nov 2018 06:53 )             24.7     Mean Cell Volume : 89.4 fl  Mean Cell Hemoglobin : 31.4 pg  Mean Cell Hemoglobin Concentration : 35.1 gm/dL  Auto Neutrophil # : 2.9 K/uL  Auto Lymphocyte # : 0.4 K/uL  Auto Monocyte # : 0.6 K/uL  Auto Eosinophil # : 0.0 K/uL  Auto Basophil # : 0.0 K/uL  Auto Neutrophil % : 73.3 %  Auto Lymphocyte % : 9.9 %  Auto Monocyte % : 16.1 %  Auto Eosinophil % : 0.3 %  Auto Basophil % : 0.4 %    11-03  137  |  103  |  14  ----------------------------<  130<H>  4.1   |  24  |  0.62    Ca    10.3      03 Nov 2018 06:53  Phos  2.1     11-03  Mg     2.0     11-03    TPro  6.8  /  Alb  4.0  /  TBili  0.6  /  DBili  x   /  AST  12  /  ALT  15  /  AlkPhos  158<H>  11-03    Mg 2.0  Phos 2.1      Uric Acid 3.0    RADIOLOGY & ADDITIONAL STUDIES:    < from: CT Head No Cont (10.23.18 @ 16:30) >  IMPRESSION: No change in posterior interhemispheric subdural hemorrhage   and hemorrhage extending along the left tentorial leaf compared with 8:44   AM. No hydrocephalus. Mild cerebellar atrophy. Diagnosis: Relapsed ALL Ph (+)     Protocol/Chemo Regimen: S/p Cycle 1 Inotuzamab Day 1, 8 and 15    Day: 27    Pt endorsed: increased headaches, stating current dilaudid dose not relieving pain     Review of Systems: Denies nausea, vomiting, diarrhea, chest pain, SOB      Pain scale: 0    Diet: Regular    Allergies: No Known Drug Allergies, shellfish (Nausea; Vomiting)    ANTIMICROBIALS  cefepime   IVPB 2000 milliGRAM(s) IV Intermittent every 8 hours  micafungin IVPB 100 milliGRAM(s) IV Intermittent daily    STANDING MEDICATIONS  AQUAPHOR (petrolatum Ointment) 1 Application(s) Topical two times a day  influenza   Vaccine 0.5 milliLiter(s) IntraMuscular once  levETIRAcetam 500 milliGRAM(s) Oral two times a day  multivitamin 1 Tablet(s) Oral daily  pantoprazole    Tablet 40 milliGRAM(s) Oral before breakfast  potassium phosphate IVPB 15 milliMole(s) IV Intermittent once  sodium chloride 0.9% lock flush 20 milliLiter(s) IV Push once  sodium chloride 0.9%. 1000 milliLiter(s) IV Continuous <Continuous>    PRN MEDICATIONS  acetaminophen   Tablet .. 650 milliGRAM(s) Oral every 6 hours PRN  acetaminophen   Tablet .. 650 milliGRAM(s) Oral every 6 hours PRN  HYDROmorphone  Injectable 1 milliGRAM(s) IV Push every 4 hours PRN  HYDROmorphone  Injectable 0.5 milliGRAM(s) IV Push every 6 hours PRN  metoclopramide Injectable 10 milliGRAM(s) IV Push every 6 hours PRN  sodium chloride 0.9% lock flush 10 milliLiter(s) IV Push every 1 hour PRN  sodium chloride 0.9% lock flush 10 milliLiter(s) IV Push every 12 hours PRN    Vital Signs Last 24 Hrs  T(C): 37.5 (03 Nov 2018 09:11), Max: 38.6 (02 Nov 2018 20:02)  T(F): 99.5 (03 Nov 2018 09:11), Max: 101.4 (02 Nov 2018 20:02)  HR: 100 (03 Nov 2018 09:11) (78 - 128)  BP: 134/76 (03 Nov 2018 09:11) (100/59 - 156/90)  BP(mean): --  RR: 18 (03 Nov 2018 09:11) (16 - 18)  SpO2: 99% (03 Nov 2018 09:11) (97% - 100%)    PHYSICAL EXAM  General: adult in NAD  HEENT: clear oropharynx, no erythema, no ulcers  CV: normal S1, S2, RRR  Lungs: clear to auscultation, no wheezes, no rales  Abdomen: soft, nontender, nondistended, normal BS  Ext: no edema  Skin: no rash  Neuro: alert and oriented x 3  Central line: normal     LABS:                        8.7    4.0   )-----------( 30       ( 03 Nov 2018 06:53 )             24.7     Mean Cell Volume : 89.4 fl  Mean Cell Hemoglobin : 31.4 pg  Mean Cell Hemoglobin Concentration : 35.1 gm/dL  Auto Neutrophil # : 2.9 K/uL  Auto Lymphocyte # : 0.4 K/uL  Auto Monocyte # : 0.6 K/uL  Auto Eosinophil # : 0.0 K/uL  Auto Basophil # : 0.0 K/uL  Auto Neutrophil % : 73.3 %  Auto Lymphocyte % : 9.9 %  Auto Monocyte % : 16.1 %  Auto Eosinophil % : 0.3 %  Auto Basophil % : 0.4 %    11-03  137  |  103  |  14  ----------------------------<  130<H>  4.1   |  24  |  0.62    Ca    10.3      03 Nov 2018 06:53  Phos  2.1     11-03  Mg     2.0     11-03    TPro  6.8  /  Alb  4.0  /  TBili  0.6  /  DBili  x   /  AST  12  /  ALT  15  /  AlkPhos  158<H>  11-03    Mg 2.0  Phos 2.1      Uric Acid 3.0    RADIOLOGY & ADDITIONAL STUDIES:    < from: CT Head No Cont (10.23.18 @ 16:30) >  IMPRESSION: No change in posterior interhemispheric subdural hemorrhage   and hemorrhage extending along the left tentorial leaf compared with 8:44   AM. No hydrocephalus. Mild cerebellar atrophy.

## 2018-11-03 NOTE — PROGRESS NOTE ADULT - PROBLEM SELECTOR PLAN 2
Patient is neutropenic, febrile   If febrile Pan Cx and CXR  Continue Mycamine for ppx (No azoles with Inotuzamab) and Cefepime   10/16 CT C/A/P with Scattered and patchy ground-glass opacities in the right upper and lower lobes, possibly infection   ID following

## 2018-11-03 NOTE — CHART NOTE - NSCHARTNOTEFT_GEN_A_CORE
MEDICINE NP (episodic) Note     Notified by RN patient with temperature . Seen and examined patient at bedside. Patient is alert, NAD. Confirms ongoing HA, dizziness, but denies any CP, SOB, cough, sore throat, nasal congestion, N/V/D,  abd pain, or dysuria.    VITAL SIGNS:  T(C): 37.4 (11-02-18 @ 23:05), Max: 38.6 (11-02-18 @ 20:02)  HR: 105 (11-02-18 @ 23:05) (72 - 128)  BP: 100/59 (11-02-18 @ 23:05) (100/59 - 156/90)  RR: 18 (11-02-18 @ 23:05) (16 - 18)  SpO2: 100% (11-02-18 @ 23:05) (97% - 100%)  Wt(kg): --      LABORATORY:                          8.2    2.2   )-----------( 41       ( 02 Nov 2018 06:52 )             23.3       11-02    141  |  105  |  16  ----------------------------<  82  4.4   |  25  |  0.60    Ca    10.6<H>      02 Nov 2018 06:52  Phos  2.8     11-02  Mg     2.2     11-02    TPro  6.7  /  Alb  3.8  /  TBili  0.5  /  DBili  x   /  AST  13  /  ALT  17  /  AlkPhos  160<H>  11-02      Urine Appearance:Clear  pH Urine:--  Specific Gravity: 1.022   Nitrite:Negative  Leukocyte Esterase Concentration: Negative   Ketone:Negative  Glucose: --  Protein: 30 mg/dL  Blood:Negative  Bilirubin: Negative  Urobiligen:Negative        MICROBIOLOGY:  Culture - Blood (10.16.18 @ 13:21)    Specimen Source: .Blood Blood-Peripheral    Culture Results:   No growth at 5 days.  Culture - Blood (10.16.18 @ 13:20)    Specimen Source: .Blood PICC/PERC Double Lumen BLUE    Culture Results:   No growth at 5 days.  Culture - Urine (10.16.18 @ 13:01)    Specimen Source: .Urine Clean Catch (Midstream)    Culture Results:   No growth    RADIOLOGY: < from: CT Chest w/ IV Cont (10.16.18 @ 17:44) >    Scattered and patchy groundglass opacities in the right upper and lower   lobes, possibly infection.     Heterogeneous thickening of the endometrial cavity, measuring 1.3 cm, for   which further evaluation with pelvic ultrasound is recommended.    < end of copied text >      No Known Drug Allergies  shellfish (Nausea; Vomiting)    ABX: cefepime   IVPB 2000 milliGRAM(s) IV Intermittent every 8 hours  micafungin IVPB 100 milliGRAM(s) IV Intermittent daily      PHYSICAL EXAM:  Constitutional: AOx3. NAD. Non toxic appearing.   HEENT: no scleral injection, no JVD   Respiratory: right sided crackles, No wheezing, rhonchi.  Cardiovascular: S1 S2. regular tachycardiac  Gastrointestinal: BS X4 active. soft. ND/NT. No guarding or rebound tenderness noted.   Extremities: No BLLE edema noted. FROM x4       ASSESSMENT/PLAN:   57 yo F with PMHx of HTN, Obesity and ALL Ph(+) status post 4 cycles of R-Hyper CVAD with IT MTX x 2 (via Omaya) followed by Autologous HPSCT on 12/16/16 then on Sprycel maintenance admitted for relapsed disease. Patient is now s/p Cycle 1 Inotuzumab on days 1, 8, 15. The patient's hospital course has been complicated by SDH, subconjunctival hemorrhage, neutropenic fevers, transaminitis and upper GIB.  Pt now with neutropenic fever.      1) Neutropenic Fever  -tylenol and cooling measures prn for pyrexia  -BC x2, UA/UC ordered, last cultures 10/16 with no growth  -CXR ordered, results pending, CT chest previously with opacities  - c/w Cefepim and Micafungin, pt followed by House ID.  - c/w IVFs.  -F/U primary team in AM    Kathy Webb, QUAN  [id number] MEDICINE NP (episodic) Note     Notified by RN patient with temperature . Seen and examined patient at bedside. Patient is alert, NAD. Confirms ongoing HA, dizziness, but denies any CP, SOB, cough, sore throat, nasal congestion, N/V/D,  abd pain, or dysuria.    VITAL SIGNS:  T(C): 37.4 (11-02-18 @ 23:05), Max: 38.6 (11-02-18 @ 20:02)  HR: 105 (11-02-18 @ 23:05) (72 - 128)  BP: 100/59 (11-02-18 @ 23:05) (100/59 - 156/90)  RR: 18 (11-02-18 @ 23:05) (16 - 18)  SpO2: 100% (11-02-18 @ 23:05) (97% - 100%)  Wt(kg): --      LABORATORY:                          8.2    2.2   )-----------( 41       ( 02 Nov 2018 06:52 )             23.3       11-02    141  |  105  |  16  ----------------------------<  82  4.4   |  25  |  0.60    Ca    10.6<H>      02 Nov 2018 06:52  Phos  2.8     11-02  Mg     2.2     11-02    TPro  6.7  /  Alb  3.8  /  TBili  0.5  /  DBili  x   /  AST  13  /  ALT  17  /  AlkPhos  160<H>  11-02      Urine Appearance:Clear  pH Urine:--  Specific Gravity: 1.022   Nitrite:Negative  Leukocyte Esterase Concentration: Negative   Ketone:Negative  Glucose: --  Protein: 30 mg/dL  Blood:Negative  Bilirubin: Negative  Urobiligen:Negative        MICROBIOLOGY:  Culture - Blood (10.16.18 @ 13:21)    Specimen Source: .Blood Blood-Peripheral    Culture Results:   No growth at 5 days.  Culture - Blood (10.16.18 @ 13:20)    Specimen Source: .Blood PICC/PERC Double Lumen BLUE    Culture Results:   No growth at 5 days.  Culture - Urine (10.16.18 @ 13:01)    Specimen Source: .Urine Clean Catch (Midstream)    Culture Results:   No growth    RADIOLOGY: < from: CT Chest w/ IV Cont (10.16.18 @ 17:44) >    Scattered and patchy groundglass opacities in the right upper and lower   lobes, possibly infection.     Heterogeneous thickening of the endometrial cavity, measuring 1.3 cm, for   which further evaluation with pelvic ultrasound is recommended.    < end of copied text >      No Known Drug Allergies  shellfish (Nausea; Vomiting)    ABX: cefepime   IVPB 2000 milliGRAM(s) IV Intermittent every 8 hours  micafungin IVPB 100 milliGRAM(s) IV Intermittent daily      PHYSICAL EXAM:  Constitutional: AOx3. NAD. Non toxic appearing.   HEENT: no scleral injection, no JVD   Respiratory: right sided crackles, No wheezing, rhonchi.  Cardiovascular: S1 S2. regular tachycardiac  Gastrointestinal: BS X4 active. soft. ND/NT. No guarding or rebound tenderness noted.   Extremities: No BLLE edema noted. FROM x4       ASSESSMENT/PLAN:   59 yo F with PMHx of HTN, Obesity and ALL Ph(+) status post 4 cycles of R-Hyper CVAD with IT MTX x 2 (via Omaya) followed by Autologous HPSCT on 12/16/16 then on Sprycel maintenance admitted for relapsed disease. Patient is now s/p Cycle 1 Inotuzumab on days 1, 8, 15. The patient's hospital course has been complicated by SDH, subconjunctival hemorrhage, neutropenic fevers, transaminitis and upper GIB.  Pt now with neutropenic fever.      1) Neutropenic Fever  -tylenol and cooling measures prn for pyrexia  - EKG ordered for  Sinus Tachycardiac   -BC x2, UA/UC ordered, last cultures 10/16 with no growth  -CXR ordered, results pending, CT chest previously with opacities  - c/w Cefepime and Micafungin, pt followed by House ID.  - c/w IVFs.  -F/U primary team in AM    Kathy Webb, NP  [id number]

## 2018-11-03 NOTE — PROVIDER CONTACT NOTE (CRITICAL VALUE NOTIFICATION) - ACTION/TREATMENT ORDERED:
PLT ordered
transfuse 2 1/2 bags platelets over 3 hrs and 1 unit PRBC's
transfusion
transfusion
transfusion and supplementation
1 Unit of PRBCs and 1/2 Unit of platelets ordered.
No orders given as in now.
Monitoring is on going.
Pt to get x1 order of Vancomycin 1000 mg
1/2 unit of plt
Half unit platelets to be given over 3hrs q12hrs. Will continue to monitor.
None
PLT transfusion ordered. Will carry out order.
PRBC 1 unit and PLT 1 unit ordered. Will continue to monitor.
PRBC 1 unit to be ordered. Will continue to monitor.
Platelet to be transfused as ordered.
Platelets to be ordered and transfused over 3hrs q12h. Will continue to monitor.
Plts 1/2 unit to be given over 3hrs q12h. Will continue to monitor.
Plts and PRBC ordered. Will continue to monitor.
no actions taken yet will follow up
plts ordered
trans fuse 1 bag plts

## 2018-11-03 NOTE — PROVIDER CONTACT NOTE (CRITICAL VALUE NOTIFICATION) - ASSESSMENT
NAD
A & o x 4, complains of HA.
Patient A&Ox4; patient denies SOB, headache, chest pain and abdominal pain. No signs and symptoms of bleeding.
Patient alert and oriented x 4.
Pt A&Ox4, VSS. Pt without s/s of active bleeding.
Pt afebrile, no c/o
Stable
a/ox4
a/ox4
alert
no s&s noted, VSS

## 2018-11-03 NOTE — PROGRESS NOTE ADULT - PROBLEM SELECTOR PLAN 3
10/4 CT Head revealed small bilateral acute on chronic convexity subdural hematomas. Repeat CT Head on 10/5, 10/6 and 10/11 is stable with 10/16 results showing SDH decreasing in size. Repeat CT Head 10/23 with some small area of bleeding into previous collection. Per Neuro Sx, repeat CT Head in 6 hours was unchanged  Continue Keppra 500mg BID for seizure ppx   Neuro checks q 4 hrs  Pain management   Neurosurgery following, no intervention at this time. Continue to keep platelet goal above 80K and do not tap Omaya until platelets are at a minimum of 50K as SDH may increase 10/4 CT Head revealed small bilateral acute on chronic convexity subdural hematomas. Repeat CT Head on 10/5, 10/6 and 10/11 is stable with 10/16 results showing SDH decreasing in size. Repeat CT Head 10/23 with some small area of bleeding into previous collection. Per Neuro Sx, repeat CT Head in 6 hours was unchanged  Continue Keppra 500mg BID for seizure ppx   Neuro checks q 4 hrs  Pain management   Neurosurgery following, no intervention at this time. Continue to keep platelet goal above 80K and do not tap Omaya until platelets are at a minimum of 50K as SDH may increase  Repeat Head CT with now worsening headaches

## 2018-11-03 NOTE — PROGRESS NOTE ADULT - ATTENDING COMMENTS
58F Ph(+) ALL s/p R HyperCVAD x 4 cycles IT MTX x2 s/p stem cell collection w/ Step 1(HD vp16 plus bernardino-c) regimen.  s/p Masha-Auto on 12/16/16. Pt was on Sprycel maintenance. 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 Has completed first cycle Inotuzumab. Headaches less, but still present. No accompanying symptoms.  Cont Keppra prophylaxis.  Repeat head CT showed small re-bleed. Follow up repeat CT stable. To resume Inotuzumab on Monday.  -GI bleed has resolved, Continue po protonix.  -Afebrile today. CT c/a/p 10/16- Scattered and patchy ground glass opacities in the right upper and lower lobes, possibly infection.  Heterogeneous thickening of the endometrial cavity.  Continue vancomycin, meropenem, Mycamine.    - goal plt ct >80K, but platelets are refractory to transfusion- transfuse 1/2 units over 3 hours twice a day. Keep hb >7. Obtain HLA matched platelets as possible.  - OOB, ambulation.  Cycle 2 Inotuzumab due next week. 58F Ph(+) ALL s/p R HyperCVAD x 4 cycles IT MTX x2 s/p stem cell collection w/ Step 1(HD vp16 plus bernardino-c) regimen.  s/p Masha-Auto on 12/16/16. Pt was on Sprycel maintenance. 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 Has completed first cycle Inotuzumab. Headaches less, but still present. No accompanying symptoms.  Cont Keppra prophylaxis.  Repeat head CT showed small re-bleed. Follow up repeat CT stable. Today, she complains of severe headache not controlled with Dilaudid dose as prior. CT head is ordered.  -GI bleed has resolved, Continue po protonix.  -Febrile on 11/2. CT c/a/p 10/16- Scattered and patchy ground glass opacities in the right upper and lower lobes, possibly infection.  Heterogeneous thickening of the endometrial cavity.  Antibiotics adjusted to Cefepime/Mycamine per ID service.  - goal plt ct >80K, but platelets are refractory to transfusion- receiving transfusion of 1/2 units over 3 hours twice a day. Keeping hb >7. Obtain HLA matched platelets as possible has already been requested.  - OOB, ambulation.  Cycle 2 Inotuzumab due next week.

## 2018-11-03 NOTE — PROGRESS NOTE ADULT - PROBLEM SELECTOR PLAN 1
10/5 Peripheral flow confirms relapse with BCR/ABL rearrangement in 79.5% of cells  Cycle 1 Inotuzamab (Day 1, 8 and 15). Cycle 2 to begin 11/5/18   For BM bx after second cycle  Monitor CBC/Lytes and transfuse/replete PRN  Thrombocytopenia with SDH: Patient platelet refractory, per blood bank patient to receive 1/2 bags over 3 hours q 12 hours when HLA not available   Strict Is and Os/Daily weights/Mouth Care  Antiemetics, IVF  - Thrombocytopenia: Platelets 1/2 unit over 3 hours every 12 hours (no HLA available today)  - Hypophosphatemia: replace kphos

## 2018-11-04 LAB
-  COAGULASE NEGATIVE STAPHYLOCOCCUS: SIGNIFICANT CHANGE UP
ALBUMIN SERPL ELPH-MCNC: 3.7 G/DL — SIGNIFICANT CHANGE UP (ref 3.3–5)
ALP SERPL-CCNC: 136 U/L — HIGH (ref 40–120)
ALT FLD-CCNC: 17 U/L — SIGNIFICANT CHANGE UP (ref 10–45)
ANION GAP SERPL CALC-SCNC: 10 MMOL/L — SIGNIFICANT CHANGE UP (ref 5–17)
AST SERPL-CCNC: 11 U/L — SIGNIFICANT CHANGE UP (ref 10–40)
BASOPHILS # BLD AUTO: 0 K/UL — SIGNIFICANT CHANGE UP (ref 0–0.2)
BILIRUB SERPL-MCNC: 0.7 MG/DL — SIGNIFICANT CHANGE UP (ref 0.2–1.2)
BUN SERPL-MCNC: 11 MG/DL — SIGNIFICANT CHANGE UP (ref 7–23)
CALCIUM SERPL-MCNC: 9.9 MG/DL — SIGNIFICANT CHANGE UP (ref 8.4–10.5)
CHLORIDE SERPL-SCNC: 103 MMOL/L — SIGNIFICANT CHANGE UP (ref 96–108)
CO2 SERPL-SCNC: 23 MMOL/L — SIGNIFICANT CHANGE UP (ref 22–31)
CREAT SERPL-MCNC: 0.58 MG/DL — SIGNIFICANT CHANGE UP (ref 0.5–1.3)
CULTURE RESULTS: NO GROWTH — SIGNIFICANT CHANGE UP
CULTURE RESULTS: SIGNIFICANT CHANGE UP
EOSINOPHIL # BLD AUTO: 0 K/UL — SIGNIFICANT CHANGE UP (ref 0–0.5)
GLUCOSE SERPL-MCNC: 100 MG/DL — HIGH (ref 70–99)
HCT VFR BLD CALC: 23.2 % — LOW (ref 34.5–45)
HGB BLD-MCNC: 8.2 G/DL — LOW (ref 11.5–15.5)
LDH SERPL L TO P-CCNC: 183 U/L — SIGNIFICANT CHANGE UP (ref 50–242)
LYMPHOCYTES # BLD AUTO: 0.5 K/UL — LOW (ref 1–3.3)
LYMPHOCYTES # BLD AUTO: 12 % — LOW (ref 13–44)
MAGNESIUM SERPL-MCNC: 2.1 MG/DL — SIGNIFICANT CHANGE UP (ref 1.6–2.6)
MCHC RBC-ENTMCNC: 32 PG — SIGNIFICANT CHANGE UP (ref 27–34)
MCHC RBC-ENTMCNC: 35.3 GM/DL — SIGNIFICANT CHANGE UP (ref 32–36)
MCV RBC AUTO: 90.7 FL — SIGNIFICANT CHANGE UP (ref 80–100)
METHOD TYPE: SIGNIFICANT CHANGE UP
MONOCYTES # BLD AUTO: 0.4 K/UL — SIGNIFICANT CHANGE UP (ref 0–0.9)
MONOCYTES NFR BLD AUTO: 11 % — SIGNIFICANT CHANGE UP (ref 2–14)
NEUTROPHILS # BLD AUTO: 3 K/UL — SIGNIFICANT CHANGE UP (ref 1.8–7.4)
NEUTROPHILS NFR BLD AUTO: 77 % — SIGNIFICANT CHANGE UP (ref 43–77)
ORGANISM # SPEC MICROSCOPIC CNT: SIGNIFICANT CHANGE UP
ORGANISM # SPEC MICROSCOPIC CNT: SIGNIFICANT CHANGE UP
PHOSPHATE SERPL-MCNC: 2.6 MG/DL — SIGNIFICANT CHANGE UP (ref 2.5–4.5)
PLATELET # BLD AUTO: 30 K/UL — LOW (ref 150–400)
POTASSIUM SERPL-MCNC: 3.8 MMOL/L — SIGNIFICANT CHANGE UP (ref 3.5–5.3)
POTASSIUM SERPL-SCNC: 3.8 MMOL/L — SIGNIFICANT CHANGE UP (ref 3.5–5.3)
PROT SERPL-MCNC: 6.4 G/DL — SIGNIFICANT CHANGE UP (ref 6–8.3)
RBC # BLD: 2.56 M/UL — LOW (ref 3.8–5.2)
RBC # FLD: 17.6 % — HIGH (ref 10.3–14.5)
SODIUM SERPL-SCNC: 136 MMOL/L — SIGNIFICANT CHANGE UP (ref 135–145)
SPECIMEN SOURCE: SIGNIFICANT CHANGE UP
SPECIMEN SOURCE: SIGNIFICANT CHANGE UP
URATE SERPL-MCNC: 2.5 MG/DL — SIGNIFICANT CHANGE UP (ref 2.5–7)
WBC # BLD: 3.9 K/UL — SIGNIFICANT CHANGE UP (ref 3.8–10.5)
WBC # FLD AUTO: 3.9 K/UL — SIGNIFICANT CHANGE UP (ref 3.8–10.5)

## 2018-11-04 PROCEDURE — 99232 SBSQ HOSP IP/OBS MODERATE 35: CPT

## 2018-11-04 RX ORDER — VANCOMYCIN HCL 1 G
1000 VIAL (EA) INTRAVENOUS EVERY 12 HOURS
Qty: 0 | Refills: 0 | Status: COMPLETED | OUTPATIENT
Start: 2018-11-04 | End: 2018-11-16

## 2018-11-04 RX ORDER — ALTEPLASE 100 MG
2 KIT INTRAVENOUS ONCE
Qty: 0 | Refills: 0 | Status: DISCONTINUED | OUTPATIENT
Start: 2018-11-04 | End: 2018-11-20

## 2018-11-04 RX ADMIN — Medication 1 APPLICATION(S): at 05:11

## 2018-11-04 RX ADMIN — SODIUM CHLORIDE 50 MILLILITER(S): 9 INJECTION INTRAMUSCULAR; INTRAVENOUS; SUBCUTANEOUS at 21:54

## 2018-11-04 RX ADMIN — CEFEPIME 100 MILLIGRAM(S): 1 INJECTION, POWDER, FOR SOLUTION INTRAMUSCULAR; INTRAVENOUS at 05:09

## 2018-11-04 RX ADMIN — HYDROMORPHONE HYDROCHLORIDE 1.5 MILLIGRAM(S): 2 INJECTION INTRAMUSCULAR; INTRAVENOUS; SUBCUTANEOUS at 01:06

## 2018-11-04 RX ADMIN — CEFEPIME 100 MILLIGRAM(S): 1 INJECTION, POWDER, FOR SOLUTION INTRAMUSCULAR; INTRAVENOUS at 14:51

## 2018-11-04 RX ADMIN — HYDROMORPHONE HYDROCHLORIDE 1.5 MILLIGRAM(S): 2 INJECTION INTRAMUSCULAR; INTRAVENOUS; SUBCUTANEOUS at 05:23

## 2018-11-04 RX ADMIN — HYDROMORPHONE HYDROCHLORIDE 1.5 MILLIGRAM(S): 2 INJECTION INTRAMUSCULAR; INTRAVENOUS; SUBCUTANEOUS at 01:20

## 2018-11-04 RX ADMIN — Medication 250 MILLIGRAM(S): at 11:52

## 2018-11-04 RX ADMIN — LEVETIRACETAM 500 MILLIGRAM(S): 250 TABLET, FILM COATED ORAL at 17:40

## 2018-11-04 RX ADMIN — HYDROMORPHONE HYDROCHLORIDE 1.5 MILLIGRAM(S): 2 INJECTION INTRAMUSCULAR; INTRAVENOUS; SUBCUTANEOUS at 22:17

## 2018-11-04 RX ADMIN — HYDROMORPHONE HYDROCHLORIDE 1.5 MILLIGRAM(S): 2 INJECTION INTRAMUSCULAR; INTRAVENOUS; SUBCUTANEOUS at 11:54

## 2018-11-04 RX ADMIN — HYDROMORPHONE HYDROCHLORIDE 1.5 MILLIGRAM(S): 2 INJECTION INTRAMUSCULAR; INTRAVENOUS; SUBCUTANEOUS at 18:14

## 2018-11-04 RX ADMIN — HYDROMORPHONE HYDROCHLORIDE 1.5 MILLIGRAM(S): 2 INJECTION INTRAMUSCULAR; INTRAVENOUS; SUBCUTANEOUS at 17:44

## 2018-11-04 RX ADMIN — MICAFUNGIN SODIUM 105 MILLIGRAM(S): 100 INJECTION, POWDER, LYOPHILIZED, FOR SOLUTION INTRAVENOUS at 11:52

## 2018-11-04 RX ADMIN — Medication 1 TABLET(S): at 11:45

## 2018-11-04 RX ADMIN — SODIUM CHLORIDE 50 MILLILITER(S): 9 INJECTION INTRAMUSCULAR; INTRAVENOUS; SUBCUTANEOUS at 05:10

## 2018-11-04 RX ADMIN — HYDROMORPHONE HYDROCHLORIDE 1.5 MILLIGRAM(S): 2 INJECTION INTRAMUSCULAR; INTRAVENOUS; SUBCUTANEOUS at 13:41

## 2018-11-04 RX ADMIN — Medication 250 MILLIGRAM(S): at 23:06

## 2018-11-04 RX ADMIN — Medication 1 APPLICATION(S): at 17:40

## 2018-11-04 RX ADMIN — HYDROMORPHONE HYDROCHLORIDE 1.5 MILLIGRAM(S): 2 INJECTION INTRAMUSCULAR; INTRAVENOUS; SUBCUTANEOUS at 14:49

## 2018-11-04 RX ADMIN — HYDROMORPHONE HYDROCHLORIDE 1.5 MILLIGRAM(S): 2 INJECTION INTRAMUSCULAR; INTRAVENOUS; SUBCUTANEOUS at 05:40

## 2018-11-04 RX ADMIN — CEFEPIME 100 MILLIGRAM(S): 1 INJECTION, POWDER, FOR SOLUTION INTRAMUSCULAR; INTRAVENOUS at 21:55

## 2018-11-04 RX ADMIN — PANTOPRAZOLE SODIUM 40 MILLIGRAM(S): 20 TABLET, DELAYED RELEASE ORAL at 05:09

## 2018-11-04 RX ADMIN — LEVETIRACETAM 500 MILLIGRAM(S): 250 TABLET, FILM COATED ORAL at 05:09

## 2018-11-04 RX ADMIN — HYDROMORPHONE HYDROCHLORIDE 1.5 MILLIGRAM(S): 2 INJECTION INTRAMUSCULAR; INTRAVENOUS; SUBCUTANEOUS at 09:33

## 2018-11-04 RX ADMIN — HYDROMORPHONE HYDROCHLORIDE 1.5 MILLIGRAM(S): 2 INJECTION INTRAMUSCULAR; INTRAVENOUS; SUBCUTANEOUS at 21:47

## 2018-11-04 NOTE — PROGRESS NOTE ADULT - PROBLEM SELECTOR PLAN 1
10/5 Peripheral flow confirms relapse with BCR/ABL rearrangement in 79.5% of cells  Cycle 1 Inotuzamab (Day 1, 8 and 15). Cycle 2 to begin 11/5/18   For BM bx after second cycle  Monitor CBC/Lytes and transfuse/replete PRN  Thrombocytopenia with SDH: Patient platelet refractory, per blood bank patient to receive 1/2 bags over 3 hours q 12 hours when HLA not available   Strict Is and Os/Daily weights/Mouth Care  Antiemetics, IVF  - Thrombocytopenia: Platelets 1/2 unit over 3 hours every 12 hours (no HLA available today) 10/5 Peripheral flow confirms relapse with BCR/ABL rearrangement in 79.5% of cells  Cycle 1 Inotuzamab (Day 1, 8 and 15). Cycle 2 to begin 11/5/18 (may be postponed to 11/6 when medication is available)   For BM bx after second cycle  Monitor CBC/Lytes and transfuse/replete PRN  Thrombocytopenia with SDH: Patient platelet refractory, per blood bank patient to receive 1/2 bags over 3 hours q 12 hours when HLA not available   Strict Is and Os/Daily weights/Mouth Care  Antiemetics, IVF  - Thrombocytopenia: Platelets 1/2 unit over 3 hours every 12 hours (no HLA available today) 10/5 Peripheral flow confirms relapse with BCR/ABL rearrangement in 79.5% of cells  S/p Cycle 1 Inotuzamab (Day 1, 8 and 15). Cycle 2 to begin 11/5/18 (may be postponed to 11/6 when medication is available)   Plan for BM bx after second cycle  Monitor CBC/Lytes and transfuse/replete PRN  Thrombocytopenia with SDH: Patient platelet refractory, per blood bank patient to receive 1/2 bags over 3 hours q 12 hours when HLA not available   Strict Is and Os/Daily weights/Mouth Care  Antiemetics, IVF  - Thrombocytopenia: Platelets 1/2 unit over 3 hours every 12 hours (no HLA available today)

## 2018-11-04 NOTE — PROGRESS NOTE ADULT - SUBJECTIVE AND OBJECTIVE BOX
Diagnosis: Relapsed ALL Ph (+)     Protocol/Chemo Regimen: S/p Cycle 1 Inotuzamab Day 1, 8 and 15    Day: 28    Pt endorsed:   increased headaches, stating current dilaudid dose not relieving pain     Review of Systems: Denies nausea, vomiting, diarrhea, chest pain, SOB      Pain scale: 0    Diet: Regular    Allergies: No Known Drug Allergies, shellfish (Nausea; Vomiting)    --------------- Diagnosis: Relapsed ALL Ph (+)     Protocol/Chemo Regimen: S/p Cycle 1 Inotuzamab Day 1, 8 and 15    Day: 28    Pt endorsed: headaches, relieved by current pain regimen     Review of Systems: Denies nausea, vomiting, diarrhea, chest pain, SOB      Pain scale: 0    Diet: Regular    Allergies: No Known Drug Allergies, shellfish (Nausea; Vomiting)    ANTIMICROBIALS  cefepime   IVPB 2000 milliGRAM(s) IV Intermittent every 8 hours  micafungin IVPB 100 milliGRAM(s) IV Intermittent daily  vancomycin  IVPB 1000 milliGRAM(s) IV Intermittent every 12 hours    STANDING MEDICATIONS  AQUAPHOR (petrolatum Ointment) 1 Application(s) Topical two times a day  influenza   Vaccine 0.5 milliLiter(s) IntraMuscular once  levETIRAcetam 500 milliGRAM(s) Oral two times a day  multivitamin 1 Tablet(s) Oral daily  pantoprazole    Tablet 40 milliGRAM(s) Oral before breakfast  sodium chloride 0.9% lock flush 20 milliLiter(s) IV Push once  sodium chloride 0.9%. 1000 milliLiter(s) IV Continuous <Continuous>    PRN MEDICATIONS  acetaminophen   Tablet .. 650 milliGRAM(s) Oral every 6 hours PRN  acetaminophen   Tablet .. 650 milliGRAM(s) Oral every 6 hours PRN  HYDROmorphone  Injectable 1.5 milliGRAM(s) IV Push every 4 hours PRN  HYDROmorphone  Injectable 0.5 milliGRAM(s) IV Push every 4 hours PRN  metoclopramide Injectable 10 milliGRAM(s) IV Push every 6 hours PRN  sodium chloride 0.9% lock flush 10 milliLiter(s) IV Push every 1 hour PRN  sodium chloride 0.9% lock flush 10 milliLiter(s) IV Push every 12 hours PRN    Vital Signs Last 24 Hrs  T(C): 37.1 (04 Nov 2018 05:32), Max: 37.8 (03 Nov 2018 15:42)  T(F): 98.8 (04 Nov 2018 05:32), Max: 100 (03 Nov 2018 15:42)  HR: 95 (04 Nov 2018 05:32) (87 - 101)  BP: 123/72 (04 Nov 2018 05:32) (115/73 - 128/72)  BP(mean): --  RR: 18 (04 Nov 2018 05:32) (18 - 20)  SpO2: 98% (04 Nov 2018 05:32) (97% - 100%)    PHYSICAL EXAM  General: adult in NAD  HEENT: clear oropharynx, no erythema, no ulcers  Neck: supple  CV: normal S1, S2, RRR  Lungs: clear to auscultation, no wheezes, no rales  Abdomen: soft, nontender, nondistended, normal BS  Ext: no edema  Skin: no rashes  Neuro: alert and oriented x 3  Central line: normal     LABS:                        8.2    3.9   )-----------( 30       ( 04 Nov 2018 07:44 )             23.2     Mean Cell Volume : 90.7 fl  Mean Cell Hemoglobin : 32.0 pg  Mean Cell Hemoglobin Concentration : 35.3 gm/dL  Auto Neutrophil # : x  Auto Lymphocyte # : x  Auto Monocyte # : x  Auto Eosinophil # : x  Auto Basophil # : x  Auto Neutrophil % : x  Auto Lymphocyte % : x  Auto Monocyte % : x  Auto Eosinophil % : x  Auto Basophil % : x    11-04  136  |  103  |  11  ----------------------------<  100<H>  3.8   |  23  |  0.58    Ca    9.9      04 Nov 2018 07:39  Phos  2.6     11-04  Mg     2.1     11-04    TPro  6.4  /  Alb  3.7  /  TBili  0.7  /  DBili  x   /  AST  11  /  ALT  17  /  AlkPhos  136<H>  11-04    Mg 2.1  Phos 2.6      Uric Acid 2.5    RADIOLOGY & ADDITIONAL STUDIES:    < from: CT Head No Cont (11.03.18 @ 15:56) >    Impression: Stable ventricular size. The previously seen posterior   interhemispheric thin subdural hematoma has nearly resolved. Diagnosis: Relapsed ALL Ph (+)     Protocol/Chemo Regimen: S/p Cycle 1 Inotuzamab Day 1, 8 and 15    Day: 28    Pt endorsed: mild headaches, relieved by current pain regimen     Review of Systems: Denies nausea, vomiting, diarrhea, chest pain, SOB      Pain scale: 0    Diet: Regular    Allergies: No Known Drug Allergies, shellfish (Nausea; Vomiting)    ANTIMICROBIALS  cefepime   IVPB 2000 milliGRAM(s) IV Intermittent every 8 hours  micafungin IVPB 100 milliGRAM(s) IV Intermittent daily  vancomycin  IVPB 1000 milliGRAM(s) IV Intermittent every 12 hours    STANDING MEDICATIONS  AQUAPHOR (petrolatum Ointment) 1 Application(s) Topical two times a day  influenza   Vaccine 0.5 milliLiter(s) IntraMuscular once  levETIRAcetam 500 milliGRAM(s) Oral two times a day  multivitamin 1 Tablet(s) Oral daily  pantoprazole    Tablet 40 milliGRAM(s) Oral before breakfast  sodium chloride 0.9% lock flush 20 milliLiter(s) IV Push once  sodium chloride 0.9%. 1000 milliLiter(s) IV Continuous <Continuous>    PRN MEDICATIONS  acetaminophen   Tablet .. 650 milliGRAM(s) Oral every 6 hours PRN  acetaminophen   Tablet .. 650 milliGRAM(s) Oral every 6 hours PRN  HYDROmorphone  Injectable 1.5 milliGRAM(s) IV Push every 4 hours PRN  HYDROmorphone  Injectable 0.5 milliGRAM(s) IV Push every 4 hours PRN  metoclopramide Injectable 10 milliGRAM(s) IV Push every 6 hours PRN  sodium chloride 0.9% lock flush 10 milliLiter(s) IV Push every 1 hour PRN  sodium chloride 0.9% lock flush 10 milliLiter(s) IV Push every 12 hours PRN    Vital Signs Last 24 Hrs  T(C): 37.1 (04 Nov 2018 05:32), Max: 37.8 (03 Nov 2018 15:42)  T(F): 98.8 (04 Nov 2018 05:32), Max: 100 (03 Nov 2018 15:42)  HR: 95 (04 Nov 2018 05:32) (87 - 101)  BP: 123/72 (04 Nov 2018 05:32) (115/73 - 128/72)  BP(mean): --  RR: 18 (04 Nov 2018 05:32) (18 - 20)  SpO2: 98% (04 Nov 2018 05:32) (97% - 100%)    PHYSICAL EXAM  General: adult in NAD  HEENT: clear oropharynx, no erythema, no ulcers  CV: normal S1, S2, RRR  Lungs: clear to auscultation, no wheezes, no rales  Abdomen: soft, nontender, nondistended, normal BS  Ext: no edema  Skin: no rash  Neuro: alert and oriented x 3  Central line: normal     LABS:                        8.2    3.9   )-----------( 30       ( 04 Nov 2018 07:44 )             23.2     Mean Cell Volume : 90.7 fl  Mean Cell Hemoglobin : 32.0 pg  Mean Cell Hemoglobin Concentration : 35.3 gm/dL  Auto Neutrophil # : x  Auto Lymphocyte # : x  Auto Monocyte # : x  Auto Eosinophil # : x  Auto Basophil # : x  Auto Neutrophil % : x  Auto Lymphocyte % : x  Auto Monocyte % : x  Auto Eosinophil % : x  Auto Basophil % : x    11-04  136  |  103  |  11  ----------------------------<  100<H>  3.8   |  23  |  0.58    Ca    9.9      04 Nov 2018 07:39  Phos  2.6     11-04  Mg     2.1     11-04    TPro  6.4  /  Alb  3.7  /  TBili  0.7  /  DBili  x   /  AST  11  /  ALT  17  /  AlkPhos  136<H>  11-04    Mg 2.1  Phos 2.6      Uric Acid 2.5    RADIOLOGY & ADDITIONAL STUDIES:    < from: CT Head No Cont (11.03.18 @ 15:56) >    Impression: Stable ventricular size. The previously seen posterior   interhemispheric thin subdural hematoma has nearly resolved.

## 2018-11-04 NOTE — PROGRESS NOTE ADULT - PROBLEM SELECTOR PLAN 2
Patient is neutropenic, febrile   If febrile Pan Cx and CXR  Continue Mycamine for ppx (No azoles with Inotuzamab), Cefepime, Vancomycin (added for BC gram + cocci in clusters on 11/2)  Follow up infectious disease recommendations   10/16 CT C/A/P with Scattered and patchy ground-glass opacities in the right upper Patient is neutropenic, afebrile   Continue Mycamine for ppx (No azoles with Inotuzamab), Cefepime, Vancomycin (added for BC gram + cocci in clusters on 11/2)  Follow up infectious disease recommendations

## 2018-11-04 NOTE — PROGRESS NOTE ADULT - ATTENDING COMMENTS
58F Ph(+) ALL s/p R HyperCVAD x 4 cycles IT MTX x2 s/p stem cell collection w/ Step 1(HD vp16 plus bernardino-c) regimen.  s/p Masha-Auto on 12/16/16. Pt was on Sprycel maintenance. 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 Has completed first cycle Inotuzumab. Headaches less, but still present. No accompanying symptoms.  Cont Keppra prophylaxis.  Repeat head CT showed small re-bleed. Follow up repeat CT stable. Today, she complains of severe headache not controlled with Dilaudid dose as prior. CT head is ordered.  -GI bleed has resolved, Continue po protonix.  -Febrile on 11/2. CT c/a/p 10/16- Scattered and patchy ground glass opacities in the right upper and lower lobes, possibly infection.  Heterogeneous thickening of the endometrial cavity.  Antibiotics adjusted to Cefepime/Mycamine per ID service.  - goal plt ct >80K, but platelets are refractory to transfusion- receiving transfusion of 1/2 units over 3 hours twice a day. Keeping hb >7. Obtain HLA matched platelets as possible has already been requested.  - OOB, ambulation.  Cycle 2 Inotuzumab due next week. 58F Ph(+) ALL s/p R HyperCVAD x 4 cycles IT MTX x2 s/p stem cell collection w/ Step 1(HD vp16 plus bernardino-c) regimen.  s/p Masha-Auto on 12/16/16. Pt was on Sprycel maintenance. 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 Has completed first cycle Inotuzumab. Headaches less, but still present. No accompanying symptoms.  Cont Keppra prophylaxis.  Repeat head CT showed small re-bleed. Follow up repeat CT stable. On 11/3, she complained of severe headache not controlled with Dilaudid dose as prior. CT head(11/3)-SDH has nearly resolved. Today, she is up in chair and feels much better.   -GI bleed has resolved, Continue po Protonix.  -Febrile on 11/2. CT c/a/p 10/16- Scattered and patchy ground glass opacities in the right upper and lower lobes, possibly infection.  Heterogeneous thickening of the endometrial cavity.  Antibiotics adjusted to Cefepime/Mycamine per ID service. BC(11/2)- coag negative staph, she is started on Vanco IV, ID followup requested.  - goal plt ct >80K, but platelets are refractory to transfusion- receiving transfusion of 1/2 units over 3 hours twice a day. Keeping hb >7. Obtain HLA matched platelets as possible has already been requested.  - OOB, ambulation.  Cycle 2 Inotuzumab due next week.

## 2018-11-04 NOTE — PROGRESS NOTE ADULT - PROBLEM SELECTOR PLAN 3
10/4 CT Head revealed small bilateral acute on chronic convexity subdural hematomas. Repeat CT Head on 10/5, 10/6 and 10/11 is stable with 10/16 results showing SDH decreasing in size. Repeat CT Head 10/23 with some small area of bleeding into previous collection. Per Neuro Sx, repeat CT Head in 6 hours was unchanged  Continue Keppra 500mg BID for seizure ppx   Neuro checks q 4 hrs  Pain management   Neurosurgery following, no intervention at this time. Continue to keep platelet goal above 80K and do not tap Omaya until platelets are at a minimum of 50K as SDH may increase  Repeat Head CT for worsening headaches on 11/3 c/w nearly resolved subdural hematomas

## 2018-11-05 LAB
ALBUMIN SERPL ELPH-MCNC: 3.5 G/DL — SIGNIFICANT CHANGE UP (ref 3.3–5)
ALP SERPL-CCNC: 123 U/L — HIGH (ref 40–120)
ALT FLD-CCNC: 15 U/L — SIGNIFICANT CHANGE UP (ref 10–45)
ANION GAP SERPL CALC-SCNC: 11 MMOL/L — SIGNIFICANT CHANGE UP (ref 5–17)
APTT BLD: 20.8 SEC — LOW (ref 27.5–36.3)
AST SERPL-CCNC: 12 U/L — SIGNIFICANT CHANGE UP (ref 10–40)
BASOPHILS # BLD AUTO: 0 K/UL — SIGNIFICANT CHANGE UP (ref 0–0.2)
BASOPHILS NFR BLD AUTO: 0.3 % — SIGNIFICANT CHANGE UP (ref 0–2)
BILIRUB SERPL-MCNC: 0.6 MG/DL — SIGNIFICANT CHANGE UP (ref 0.2–1.2)
BLD GP AB SCN SERPL QL: POSITIVE — SIGNIFICANT CHANGE UP
BUN SERPL-MCNC: 10 MG/DL — SIGNIFICANT CHANGE UP (ref 7–23)
CALCIUM SERPL-MCNC: 9.8 MG/DL — SIGNIFICANT CHANGE UP (ref 8.4–10.5)
CHLORIDE SERPL-SCNC: 107 MMOL/L — SIGNIFICANT CHANGE UP (ref 96–108)
CO2 SERPL-SCNC: 22 MMOL/L — SIGNIFICANT CHANGE UP (ref 22–31)
CREAT SERPL-MCNC: 0.59 MG/DL — SIGNIFICANT CHANGE UP (ref 0.5–1.3)
EOSINOPHIL # BLD AUTO: 0 K/UL — SIGNIFICANT CHANGE UP (ref 0–0.5)
EOSINOPHIL NFR BLD AUTO: 1.5 % — SIGNIFICANT CHANGE UP (ref 0–6)
FIBRINOGEN PPP-MCNC: 543 MG/DL — HIGH (ref 350–510)
GLUCOSE SERPL-MCNC: 90 MG/DL — SIGNIFICANT CHANGE UP (ref 70–99)
HCT VFR BLD CALC: 21.5 % — LOW (ref 34.5–45)
HGB BLD-MCNC: 7.9 G/DL — LOW (ref 11.5–15.5)
INR BLD: 0.99 RATIO — SIGNIFICANT CHANGE UP (ref 0.88–1.16)
LDH SERPL L TO P-CCNC: 178 U/L — SIGNIFICANT CHANGE UP (ref 50–242)
LYMPHOCYTES # BLD AUTO: 0.6 K/UL — LOW (ref 1–3.3)
LYMPHOCYTES # BLD AUTO: 26.2 % — SIGNIFICANT CHANGE UP (ref 13–44)
MAGNESIUM SERPL-MCNC: 2.1 MG/DL — SIGNIFICANT CHANGE UP (ref 1.6–2.6)
MCHC RBC-ENTMCNC: 33.3 PG — SIGNIFICANT CHANGE UP (ref 27–34)
MCHC RBC-ENTMCNC: 36.5 GM/DL — HIGH (ref 32–36)
MCV RBC AUTO: 91.1 FL — SIGNIFICANT CHANGE UP (ref 80–100)
MONOCYTES # BLD AUTO: 0.5 K/UL — SIGNIFICANT CHANGE UP (ref 0–0.9)
MONOCYTES NFR BLD AUTO: 20.9 % — HIGH (ref 2–14)
NEUTROPHILS # BLD AUTO: 1.2 K/UL — LOW (ref 1.8–7.4)
NEUTROPHILS NFR BLD AUTO: 51.1 % — SIGNIFICANT CHANGE UP (ref 43–77)
PHOSPHATE SERPL-MCNC: 2.8 MG/DL — SIGNIFICANT CHANGE UP (ref 2.5–4.5)
PLATELET # BLD AUTO: 31 K/UL — LOW (ref 150–400)
POTASSIUM SERPL-MCNC: 3.9 MMOL/L — SIGNIFICANT CHANGE UP (ref 3.5–5.3)
POTASSIUM SERPL-SCNC: 3.9 MMOL/L — SIGNIFICANT CHANGE UP (ref 3.5–5.3)
PROT SERPL-MCNC: 6 G/DL — SIGNIFICANT CHANGE UP (ref 6–8.3)
PROTHROM AB SERPL-ACNC: 11.4 SEC — SIGNIFICANT CHANGE UP (ref 10–12.9)
RBC # BLD: 2.36 M/UL — LOW (ref 3.8–5.2)
RBC # FLD: 17.5 % — HIGH (ref 10.3–14.5)
RH IG SCN BLD-IMP: POSITIVE — SIGNIFICANT CHANGE UP
SODIUM SERPL-SCNC: 140 MMOL/L — SIGNIFICANT CHANGE UP (ref 135–145)
URATE SERPL-MCNC: 2.8 MG/DL — SIGNIFICANT CHANGE UP (ref 2.5–7)
VANCOMYCIN TROUGH SERPL-MCNC: 13.5 UG/ML — SIGNIFICANT CHANGE UP (ref 10–20)
WBC # BLD: 2.4 K/UL — LOW (ref 3.8–10.5)
WBC # FLD AUTO: 2.4 K/UL — LOW (ref 3.8–10.5)

## 2018-11-05 PROCEDURE — 99232 SBSQ HOSP IP/OBS MODERATE 35: CPT

## 2018-11-05 PROCEDURE — 99232 SBSQ HOSP IP/OBS MODERATE 35: CPT | Mod: GC

## 2018-11-05 RX ADMIN — Medication 250 MILLIGRAM(S): at 13:18

## 2018-11-05 RX ADMIN — CEFEPIME 100 MILLIGRAM(S): 1 INJECTION, POWDER, FOR SOLUTION INTRAMUSCULAR; INTRAVENOUS at 22:23

## 2018-11-05 RX ADMIN — Medication 1 APPLICATION(S): at 17:24

## 2018-11-05 RX ADMIN — HYDROMORPHONE HYDROCHLORIDE 1.5 MILLIGRAM(S): 2 INJECTION INTRAMUSCULAR; INTRAVENOUS; SUBCUTANEOUS at 19:31

## 2018-11-05 RX ADMIN — HYDROMORPHONE HYDROCHLORIDE 1.5 MILLIGRAM(S): 2 INJECTION INTRAMUSCULAR; INTRAVENOUS; SUBCUTANEOUS at 15:03

## 2018-11-05 RX ADMIN — HYDROMORPHONE HYDROCHLORIDE 1.5 MILLIGRAM(S): 2 INJECTION INTRAMUSCULAR; INTRAVENOUS; SUBCUTANEOUS at 11:00

## 2018-11-05 RX ADMIN — CEFEPIME 100 MILLIGRAM(S): 1 INJECTION, POWDER, FOR SOLUTION INTRAMUSCULAR; INTRAVENOUS at 15:24

## 2018-11-05 RX ADMIN — HYDROMORPHONE HYDROCHLORIDE 1.5 MILLIGRAM(S): 2 INJECTION INTRAMUSCULAR; INTRAVENOUS; SUBCUTANEOUS at 23:37

## 2018-11-05 RX ADMIN — HYDROMORPHONE HYDROCHLORIDE 1.5 MILLIGRAM(S): 2 INJECTION INTRAMUSCULAR; INTRAVENOUS; SUBCUTANEOUS at 20:01

## 2018-11-05 RX ADMIN — HYDROMORPHONE HYDROCHLORIDE 1.5 MILLIGRAM(S): 2 INJECTION INTRAMUSCULAR; INTRAVENOUS; SUBCUTANEOUS at 06:14

## 2018-11-05 RX ADMIN — PANTOPRAZOLE SODIUM 40 MILLIGRAM(S): 20 TABLET, DELAYED RELEASE ORAL at 05:39

## 2018-11-05 RX ADMIN — Medication 250 MILLIGRAM(S): at 22:22

## 2018-11-05 RX ADMIN — HYDROMORPHONE HYDROCHLORIDE 1.5 MILLIGRAM(S): 2 INJECTION INTRAMUSCULAR; INTRAVENOUS; SUBCUTANEOUS at 10:43

## 2018-11-05 RX ADMIN — LEVETIRACETAM 500 MILLIGRAM(S): 250 TABLET, FILM COATED ORAL at 17:24

## 2018-11-05 RX ADMIN — LEVETIRACETAM 500 MILLIGRAM(S): 250 TABLET, FILM COATED ORAL at 05:39

## 2018-11-05 RX ADMIN — MICAFUNGIN SODIUM 105 MILLIGRAM(S): 100 INJECTION, POWDER, LYOPHILIZED, FOR SOLUTION INTRAVENOUS at 14:02

## 2018-11-05 RX ADMIN — HYDROMORPHONE HYDROCHLORIDE 1.5 MILLIGRAM(S): 2 INJECTION INTRAMUSCULAR; INTRAVENOUS; SUBCUTANEOUS at 05:44

## 2018-11-05 RX ADMIN — HYDROMORPHONE HYDROCHLORIDE 1.5 MILLIGRAM(S): 2 INJECTION INTRAMUSCULAR; INTRAVENOUS; SUBCUTANEOUS at 15:18

## 2018-11-05 RX ADMIN — CEFEPIME 100 MILLIGRAM(S): 1 INJECTION, POWDER, FOR SOLUTION INTRAMUSCULAR; INTRAVENOUS at 05:40

## 2018-11-05 RX ADMIN — Medication 1 APPLICATION(S): at 05:39

## 2018-11-05 RX ADMIN — Medication 1 TABLET(S): at 12:17

## 2018-11-05 RX ADMIN — SODIUM CHLORIDE 50 MILLILITER(S): 9 INJECTION INTRAMUSCULAR; INTRAVENOUS; SUBCUTANEOUS at 19:32

## 2018-11-05 RX ADMIN — HYDROMORPHONE HYDROCHLORIDE 1.5 MILLIGRAM(S): 2 INJECTION INTRAMUSCULAR; INTRAVENOUS; SUBCUTANEOUS at 02:23

## 2018-11-05 RX ADMIN — HYDROMORPHONE HYDROCHLORIDE 1.5 MILLIGRAM(S): 2 INJECTION INTRAMUSCULAR; INTRAVENOUS; SUBCUTANEOUS at 02:53

## 2018-11-05 NOTE — PROGRESS NOTE ADULT - SUBJECTIVE AND OBJECTIVE BOX
Diagnosis: Relapsed ALL Ph (+)     Protocol/Chemo Regimen: S/p Cycle 2 Inotuzamab Day 1, 8 and 15    Day: 1    Pt endorsed: mild headaches, relieved by current pain regimen     Review of Systems: Denies nausea, vomiting, diarrhea, chest pain, SOB      Pain scale: 0    Diet: Regular    Allergies: No Known Drug Allergies, shellfish (Nausea; Vomiting)      ANTIMICROBIALS  cefepime   IVPB 2000 milliGRAM(s) IV Intermittent every 8 hours  micafungin IVPB 100 milliGRAM(s) IV Intermittent daily  vancomycin  IVPB 1000 milliGRAM(s) IV Intermittent every 12 hours      HEME/ONC MEDICATIONS  alteplase for catheter clearance 2 milliGRAM(s) Catheter once      STANDING MEDICATIONS  AQUAPHOR (petrolatum Ointment) 1 Application(s) Topical two times a day  influenza   Vaccine 0.5 milliLiter(s) IntraMuscular once  levETIRAcetam 500 milliGRAM(s) Oral two times a day  multivitamin 1 Tablet(s) Oral daily  pantoprazole    Tablet 40 milliGRAM(s) Oral before breakfast  sodium chloride 0.9% lock flush 20 milliLiter(s) IV Push once  sodium chloride 0.9%. 1000 milliLiter(s) IV Continuous <Continuous>      PRN MEDICATIONS  acetaminophen   Tablet .. 650 milliGRAM(s) Oral every 6 hours PRN  acetaminophen   Tablet .. 650 milliGRAM(s) Oral every 6 hours PRN  HYDROmorphone  Injectable 1.5 milliGRAM(s) IV Push every 4 hours PRN  HYDROmorphone  Injectable 0.5 milliGRAM(s) IV Push every 4 hours PRN  metoclopramide Injectable 10 milliGRAM(s) IV Push every 6 hours PRN  sodium chloride 0.9% lock flush 10 milliLiter(s) IV Push every 1 hour PRN  sodium chloride 0.9% lock flush 10 milliLiter(s) IV Push every 12 hours PRN      Vital Signs Last 24 Hrs  T(C): 36.4 (05 Nov 2018 05:05), Max: 37.2 (04 Nov 2018 17:42)  T(F): 97.6 (05 Nov 2018 05:05), Max: 98.9 (04 Nov 2018 17:42)  HR: 73 (05 Nov 2018 05:05) (73 - 100)  BP: 128/75 (05 Nov 2018 05:05) (101/67 - 146/78)  RR: 18 (05 Nov 2018 05:05) (18 - 18)  SpO2: 100% (05 Nov 2018 05:05) (100% - 100%)    PHYSICAL EXAM  General: adult in NAD  HEENT: clear oropharynx, anicteric sclera, pink conjunctiva  Neck: supple  CV: normal S1/S2 RRR  Lungs: positive air movement b/l ant lungs,clear to auscultation, no wheezes, no rales  Abdomen: soft non-tender non-distended, no hepatosplenomegaly  Ext: no clubbing cyanosis or edema  Skin: no rashes and no petechiae  Neuro: alert and oriented X 4, no focal deficits  Central Line: normal    LABS:    Cultures:     Culture - Blood (11.03.18 @ 08:39)    Specimen Source: .Blood Blood-Peripheral    Culture Results:   No growth to date.        RADIOLOGY & ADDITIONAL STUDIES:    from: CT Head No Cont (11.03.18 @ 15:56)     Impression: Stable ventricular size. The previously seen posterior   interhemispheric thin subdural hematoma has nearly resolved. Diagnosis: Relapsed ALL Ph (+)     Protocol/Chemo Regimen: S/p Cycle 2 Inotuzamab Day 1, 8 and 15    Day: 0    Pt endorsed: mild headaches, relieved by current pain regimen     Review of Systems: Denies nausea, vomiting, diarrhea, chest pain, SOB      Pain scale: 0    Diet: Regular    Allergies: No Known Drug Allergies, shellfish (Nausea; Vomiting)      ANTIMICROBIALS  cefepime   IVPB 2000 milliGRAM(s) IV Intermittent every 8 hours  micafungin IVPB 100 milliGRAM(s) IV Intermittent daily  vancomycin  IVPB 1000 milliGRAM(s) IV Intermittent every 12 hours      HEME/ONC MEDICATIONS  alteplase for catheter clearance 2 milliGRAM(s) Catheter once      STANDING MEDICATIONS  AQUAPHOR (petrolatum Ointment) 1 Application(s) Topical two times a day  influenza   Vaccine 0.5 milliLiter(s) IntraMuscular once  levETIRAcetam 500 milliGRAM(s) Oral two times a day  multivitamin 1 Tablet(s) Oral daily  pantoprazole    Tablet 40 milliGRAM(s) Oral before breakfast  sodium chloride 0.9% lock flush 20 milliLiter(s) IV Push once  sodium chloride 0.9%. 1000 milliLiter(s) IV Continuous <Continuous>      PRN MEDICATIONS  acetaminophen   Tablet .. 650 milliGRAM(s) Oral every 6 hours PRN  acetaminophen   Tablet .. 650 milliGRAM(s) Oral every 6 hours PRN  HYDROmorphone  Injectable 1.5 milliGRAM(s) IV Push every 4 hours PRN  HYDROmorphone  Injectable 0.5 milliGRAM(s) IV Push every 4 hours PRN  metoclopramide Injectable 10 milliGRAM(s) IV Push every 6 hours PRN  sodium chloride 0.9% lock flush 10 milliLiter(s) IV Push every 1 hour PRN  sodium chloride 0.9% lock flush 10 milliLiter(s) IV Push every 12 hours PRN      Vital Signs Last 24 Hrs  T(C): 36.4 (05 Nov 2018 05:05), Max: 37.2 (04 Nov 2018 17:42)  T(F): 97.6 (05 Nov 2018 05:05), Max: 98.9 (04 Nov 2018 17:42)  HR: 73 (05 Nov 2018 05:05) (73 - 100)  BP: 128/75 (05 Nov 2018 05:05) (101/67 - 146/78)  RR: 18 (05 Nov 2018 05:05) (18 - 18)  SpO2: 100% (05 Nov 2018 05:05) (100% - 100%)    PHYSICAL EXAM  General: adult in NAD  HEENT: clear oropharynx, anicteric sclera, pink conjunctiva  Neck: supple  CV: normal S1/S2 RRR  Lungs: positive air movement b/l ant lungs,clear to auscultation, no wheezes, no rales  Abdomen: soft non-tender non-distended, no hepatosplenomegaly  Ext: no clubbing cyanosis or edema  Skin: no rashes and no petechiae  Neuro: alert and oriented X 4, no focal deficits  Central Line: normal    LABS:    Cultures:     Culture - Blood (11.03.18 @ 08:39)    Specimen Source: .Blood Blood-Peripheral    Culture Results:   No growth to date.        RADIOLOGY & ADDITIONAL STUDIES:    from: CT Head No Cont (11.03.18 @ 15:56)     Impression: Stable ventricular size. The previously seen posterior   interhemispheric thin subdural hematoma has nearly resolved. Diagnosis: Relapsed ALL Ph (+)     Protocol/Chemo Regimen: S/p Cycle 2 Inotuzamab Day 1, 8 and 15    Day: 0    Pt endorsed: intermittent headaches; currently none now    Review of Systems: Denies nausea, vomiting, diarrhea, chest pain, SOB      Pain scale: 0    Diet: Regular    Allergies: No Known Drug Allergies, shellfish (Nausea; Vomiting)      ANTIMICROBIALS  cefepime   IVPB 2000 milliGRAM(s) IV Intermittent every 8 hours  micafungin IVPB 100 milliGRAM(s) IV Intermittent daily  vancomycin  IVPB 1000 milliGRAM(s) IV Intermittent every 12 hours      HEME/ONC MEDICATIONS  alteplase for catheter clearance 2 milliGRAM(s) Catheter once      STANDING MEDICATIONS  AQUAPHOR (petrolatum Ointment) 1 Application(s) Topical two times a day  influenza   Vaccine 0.5 milliLiter(s) IntraMuscular once  levETIRAcetam 500 milliGRAM(s) Oral two times a day  multivitamin 1 Tablet(s) Oral daily  pantoprazole    Tablet 40 milliGRAM(s) Oral before breakfast  sodium chloride 0.9% lock flush 20 milliLiter(s) IV Push once  sodium chloride 0.9%. 1000 milliLiter(s) IV Continuous <Continuous>      PRN MEDICATIONS  acetaminophen   Tablet .. 650 milliGRAM(s) Oral every 6 hours PRN  acetaminophen   Tablet .. 650 milliGRAM(s) Oral every 6 hours PRN  HYDROmorphone  Injectable 1.5 milliGRAM(s) IV Push every 4 hours PRN  HYDROmorphone  Injectable 0.5 milliGRAM(s) IV Push every 4 hours PRN  metoclopramide Injectable 10 milliGRAM(s) IV Push every 6 hours PRN  sodium chloride 0.9% lock flush 10 milliLiter(s) IV Push every 1 hour PRN  sodium chloride 0.9% lock flush 10 milliLiter(s) IV Push every 12 hours PRN      Vital Signs Last 24 Hrs  T(C): 36.4 (05 Nov 2018 05:05), Max: 37.2 (04 Nov 2018 17:42)  T(F): 97.6 (05 Nov 2018 05:05), Max: 98.9 (04 Nov 2018 17:42)  HR: 73 (05 Nov 2018 05:05) (73 - 100)  BP: 128/75 (05 Nov 2018 05:05) (101/67 - 146/78)  RR: 18 (05 Nov 2018 05:05) (18 - 18)  SpO2: 100% (05 Nov 2018 05:05) (100% - 100%)    PHYSICAL EXAM  General: adult in NAD  HEENT: clear oropharynx, anicteric sclera, pink conjunctiva  Neck: supple  CV: normal S1/S2 RRR  Lungs: positive air movement b/l ant lungs,clear to auscultation, no wheezes, no rales  Abdomen: soft non-tender non-distended  Ext: no clubbing cyanosis or edema  Skin: no rashes and no petechiae  Neuro: alert and oriented X 4, no focal deficits  Central Line: RUE PICC c/d/i    LABS:    Cultures:     Culture - Blood (11.03.18 @ 08:39)    Specimen Source: .Blood Blood-Peripheral    Culture Results:   No growth to date.                        7.9    2.4   )-----------( 31       ( 05 Nov 2018 06:57 )             21.5     05 Nov 2018 06:57    140    |  107    |  10     ----------------------------<  90     3.9     |  22     |  0.59     Ca    9.8        05 Nov 2018 06:57  Phos  2.8       05 Nov 2018 06:57  Mg     2.1       05 Nov 2018 06:57    TPro  6.0    /  Alb  3.5    /  TBili  0.6    /  DBili  x      /  AST  12     /  ALT  15     /  AlkPhos  123    05 Nov 2018 06:57    PT/INR - ( 05 Nov 2018 06:57 )   PT: 11.4 sec;   INR: 0.99 ratio    PTT - ( 05 Nov 2018 06:57 )  PTT:20.8 sec      LIVER FUNCTIONS - ( 05 Nov 2018 06:57 )  Alb: 3.5 g/dL / Pro: 6.0 g/dL / ALK PHOS: 123 U/L / ALT: 15 U/L / AST: 12 U/L / GGT: x             RADIOLOGY & ADDITIONAL STUDIES:    from: CT Head No Cont (11.03.18 @ 15:56)     Impression: Stable ventricular size. The previously seen posterior   interhemispheric thin subdural hematoma has nearly resolved. Diagnosis: Relapsed ALL Ph (+)     Protocol/Chemo Regimen: Cycle 2 Inotuzamab Days 1, 8 and 15    Day: 0     Pt endorsed: intermittent headaches; currently none now    Review of Systems: Denies nausea, vomiting, diarrhea, chest pain, SOB      Pain scale: 0    Diet: Regular    Allergies: No Known Drug Allergies, shellfish (Nausea; Vomiting)      ANTIMICROBIALS  cefepime   IVPB 2000 milliGRAM(s) IV Intermittent every 8 hours  micafungin IVPB 100 milliGRAM(s) IV Intermittent daily  vancomycin  IVPB 1000 milliGRAM(s) IV Intermittent every 12 hours      HEME/ONC MEDICATIONS  alteplase for catheter clearance 2 milliGRAM(s) Catheter once      STANDING MEDICATIONS  AQUAPHOR (petrolatum Ointment) 1 Application(s) Topical two times a day  influenza   Vaccine 0.5 milliLiter(s) IntraMuscular once  levETIRAcetam 500 milliGRAM(s) Oral two times a day  multivitamin 1 Tablet(s) Oral daily  pantoprazole    Tablet 40 milliGRAM(s) Oral before breakfast  sodium chloride 0.9% lock flush 20 milliLiter(s) IV Push once  sodium chloride 0.9%. 1000 milliLiter(s) IV Continuous <Continuous>      PRN MEDICATIONS  acetaminophen   Tablet .. 650 milliGRAM(s) Oral every 6 hours PRN  acetaminophen   Tablet .. 650 milliGRAM(s) Oral every 6 hours PRN  HYDROmorphone  Injectable 1.5 milliGRAM(s) IV Push every 4 hours PRN  HYDROmorphone  Injectable 0.5 milliGRAM(s) IV Push every 4 hours PRN  metoclopramide Injectable 10 milliGRAM(s) IV Push every 6 hours PRN  sodium chloride 0.9% lock flush 10 milliLiter(s) IV Push every 1 hour PRN  sodium chloride 0.9% lock flush 10 milliLiter(s) IV Push every 12 hours PRN      Vital Signs Last 24 Hrs  T(C): 36.4 (05 Nov 2018 05:05), Max: 37.2 (04 Nov 2018 17:42)  T(F): 97.6 (05 Nov 2018 05:05), Max: 98.9 (04 Nov 2018 17:42)  HR: 73 (05 Nov 2018 05:05) (73 - 100)  BP: 128/75 (05 Nov 2018 05:05) (101/67 - 146/78)  RR: 18 (05 Nov 2018 05:05) (18 - 18)  SpO2: 100% (05 Nov 2018 05:05) (100% - 100%)    PHYSICAL EXAM  General: adult in NAD  HEENT: clear oropharynx, anicteric sclera, pink conjunctiva  Neck: supple  CV: normal S1/S2 RRR  Lungs: positive air movement b/l ant lungs,clear to auscultation, no wheezes, no rales  Abdomen: soft non-tender non-distended  Ext: no clubbing cyanosis or edema  Skin: no rashes and no petechiae  Neuro: alert and oriented X 4, no focal deficits  Central Line: RUE PICC c/d/i    LABS:    Cultures:     Culture - Blood (11.03.18 @ 08:39)    Specimen Source: .Blood Blood-Peripheral    Culture Results:   No growth to date.                        7.9    2.4   )-----------( 31       ( 05 Nov 2018 06:57 )             21.5     05 Nov 2018 06:57    140    |  107    |  10     ----------------------------<  90     3.9     |  22     |  0.59     Ca    9.8        05 Nov 2018 06:57  Phos  2.8       05 Nov 2018 06:57  Mg     2.1       05 Nov 2018 06:57    TPro  6.0    /  Alb  3.5    /  TBili  0.6    /  DBili  x      /  AST  12     /  ALT  15     /  AlkPhos  123    05 Nov 2018 06:57    PT/INR - ( 05 Nov 2018 06:57 )   PT: 11.4 sec;   INR: 0.99 ratio    PTT - ( 05 Nov 2018 06:57 )  PTT:20.8 sec      LIVER FUNCTIONS - ( 05 Nov 2018 06:57 )  Alb: 3.5 g/dL / Pro: 6.0 g/dL / ALK PHOS: 123 U/L / ALT: 15 U/L / AST: 12 U/L / GGT: x             RADIOLOGY & ADDITIONAL STUDIES:    from: CT Head No Cont (11.03.18 @ 15:56)     Impression: Stable ventricular size. The previously seen posterior   interhemispheric thin subdural hematoma has nearly resolved.

## 2018-11-05 NOTE — PROGRESS NOTE ADULT - SUBJECTIVE AND OBJECTIVE BOX
58y old  Female who presents with a chief complaint of Headache/ r/o relapse ALL (05 Nov 2018 07:28)      Interval history:  Afebrile, some intermittent headaches, appetite good, no N/V.     No Known Drug Allergies  shellfish (Nausea; Vomiting)    Antimicrobials:  cefepime   IVPB 2000 milliGRAM(s) IV Intermittent every 8 hours  micafungin IVPB 100 milliGRAM(s) IV Intermittent daily  vancomycin  IVPB 1000 milliGRAM(s) IV Intermittent every 12 hours    REVIEW OF SYSTEMS:  No chest pain   No cough, no SOB  No abdominal pain  No dysuria  No rash.     Vital Signs Last 24 Hrs  T(C): 37.1 (11-05-18 @ 17:16), Max: 37.2 (11-04-18 @ 17:42)  T(F): 98.8 (11-05-18 @ 17:16), Max: 98.9 (11-04-18 @ 17:42)  HR: 91 (11-05-18 @ 17:16) (73 - 94)  BP: 144/92 (11-05-18 @ 17:16) (101/67 - 146/78)  RR: 18 (11-05-18 @ 17:16) (18 - 18)  SpO2: 100% (11-05-18 @ 17:16) (99% - 100%)    PHYSICAL EXAM:  Patient in no acute distress. AAOX3.  No icterus, no oral ulcers.  Cardiovascular: S1S2 normal.  Lungs: Good air entry B/L lung fields.  Gastrointestinal: soft, nontender, nondistended.  Extremities: ++ edema.  Rt arm PICC                            7.9    2.4   )-----------( 31       ( 05 Nov 2018 06:57 )             21.5   11-05    140  |  107  |  10  ----------------------------<  90  3.9   |  22  |  0.59    Ca    9.8      05 Nov 2018 06:57  Phos  2.8     11-05  Mg     2.1     11-05    TPro  6.0  /  Alb  3.5  /  TBili  0.6  /  DBili  x   /  AST  12  /  ALT  15  /  AlkPhos  123<H>  11-05      LIVER FUNCTIONS - ( 05 Nov 2018 06:57 )  Alb: 3.5 g/dL / Pro: 6.0 g/dL / ALK PHOS: 123 U/L / ALT: 15 U/L / AST: 12 U/L / GGT: x               Culture - Blood (collected 03 Nov 2018 08:39)  Source: .Blood Blood-Peripheral  Preliminary Report (04 Nov 2018 08:05):    No growth to date.    Culture - Urine (collected 03 Nov 2018 03:15)  Source: .Urine Clean Catch (Midstream)  Final Report (04 Nov 2018 10:12):    No growth    Culture - Blood (collected 02 Nov 2018 22:30)  Source: .Blood PICC/PERC Double Lumen BLUE  Gram Stain (03 Nov 2018 22:46):    Growth in anaerobic bottle: Gram Positive Cocci in Clusters  Final Report (04 Nov 2018 17:10):    Growth in anaerobic bottle: Coag Negative Staphylococcus      Radiology:  < from: CT Head No Cont (11.03.18 @ 15:56) >  Impression: Stable ventricular size. The previously seen posterior   interhemispheric thin subdural hematoma has nearly resolved.      < from: Xray Chest 1 View- PORTABLE-Urgent (11.02.18 @ 21:54) >  Impression: Right PICC tip at the SVC. Heart unremarkable. Lungs clear.

## 2018-11-05 NOTE — PROGRESS NOTE ADULT - ASSESSMENT
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 FISH and PCR negative. s/p Masha-Auto on 12/16/16. 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   Neutropenia resolved, recovering counts.   New fever friday evening.   CoNS bacteremia, likely due to PICC.   Repeat blood cx NTD.   Plan for second cycle of chemo starting this week.     Plan:   Continue with cefepime 2 gm iv q8h   Continue with Vancomycin 1 gm iv q12h  Trough 13.5, no changes in dose.   Follow up repeat blood cx  On micafungin for prophylaxis.

## 2018-11-05 NOTE — PROGRESS NOTE ADULT - PROBLEM SELECTOR PLAN 1
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 Cycle 2 to begin 11/5/18 (may be postponed to 11/6 when medication is available)   Plan for BM bx after second cycle  Monitor CBC/Lytes and transfuse/replete PRN  Thrombocytopenia with SDH: Patient platelet refractory, per blood bank patient to receive 1/2 bags over 3 hours q 12 hours when HLA not available   Strict Is and Os/Daily weights/Mouth Care  Antiemetics, IVF  - Thrombocytopenia: Platelets 1/2 unit over 3 hours every 12 hours (no HLA available today) 10/5 Peripheral flow confirms relapse with BCR/ABL rearrangement in 79.5% of cells  S/p Cycle 1 Inotuzamab (Day 1, 8 and 15). Cycle 2 to begin 11/6 when medication is available   Plan for BM bx after second cycle  Monitor CBC/Lytes and transfuse/replete PRN  Thrombocytopenia with SDH: Patient platelet refractory, per blood bank patient to receive 1/2 bags over 3 hours q 12 hours when HLA not available   Strict Is and Os/Daily weights/Mouth Care  Antiemetics, IVF  - Thrombocytopenia: Platelets 1/2 unit over 3 hours every 12 hours (no HLA available today)

## 2018-11-05 NOTE — PROGRESS NOTE ADULT - ATTENDING COMMENTS
58F Ph(+) ALL s/p R HyperCVAD x 4 cycles IT MTX x2 s/p stem cell collection w/ Step 1(HD vp16 plus bernardino-c) regimen.  s/p Masha-Auto on 12/16/16. Pt was on Sprycel maintenance. 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 Has completed first cycle Inotuzumab. Headaches less, but still present. No accompanying symptoms.  Cont Keppra prophylaxis.  Repeat head CT showed small re-bleed. Follow up repeat CT stable. On 11/3, she complained of severe headache not controlled with Dilaudid dose as prior. CT head(11/3)-SDH has nearly resolved. Today, she is up in chair and feels much better.   -GI bleed has resolved, Continue po Protonix.  -Febrile on 11/2. CT c/a/p 10/16- Scattered and patchy ground glass opacities in the right upper and lower lobes, possibly infection.  Heterogeneous thickening of the endometrial cavity.  Antibiotics adjusted to Cefepime/Mycamine per ID service. BC(11/2)- coag negative staph, she is started on Vanco IV, ID followup requested.  - goal plt ct >80K, but platelets are refractory to transfusion- receiving transfusion of 1/2 units over 3 hours twice a day. Keeping hb >7. Obtain HLA matched platelets as possible has already been requested.  - OOB, ambulation.  Cycle 2 Inotuzumab due next week. 58F Ph(+) ALL s/p R HyperCVAD x 4 cycles IT MTX x2 s/p stem cell collection w/ Step 1(HD vp16 plus bernardino-c) regimen.  s/p Masha-Auto on 12/16/16. Pt was on Sprycel maintenance. 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 Has completed first cycle Inotuzumab.   -Headaches less, but still present. CT head(11/3)- SDH has nearly resolved.   -GI bleed has resolved, Continue po Protonix.  -Febrile on 11/2. CT c/a/p 10/16- Scattered and patchy ground glass opacities in the right upper and lower lobes, possibly infection.  Heterogeneous thickening of the endometrial cavity.  Antibiotics adjusted to Cefepime/Mycamine per ID service. BCx(11/2) from PICC growing coag negative staph, she was started on Vanco IV, ID followup. f/u repeat Cx's from PICC and peripherally  - goal plt ct >80K, but platelets are refractory to transfusion- receiving transfusion of 1/2 units over 3 hours twice a day. Keeping hb >7. Obtain HLA matched platelets as possible has already been requested.  - OOB, ambulation.  -Cycle 2 Inotuzumab due today - unable to give today as no drug available, will give tomorrow

## 2018-11-05 NOTE — PROGRESS NOTE ADULT - PROBLEM SELECTOR PLAN 2
Patient is neutropenic, afebrile   Continue Mycamine for ppx (No azoles with Inotuzamab), Cefepime, Vancomycin (added for BC gram + cocci in clusters on 11/2)  Follow up infectious disease recommendations

## 2018-11-06 LAB
ALBUMIN SERPL ELPH-MCNC: 3.8 G/DL — SIGNIFICANT CHANGE UP (ref 3.3–5)
ALP SERPL-CCNC: 133 U/L — HIGH (ref 40–120)
ALT FLD-CCNC: 19 U/L — SIGNIFICANT CHANGE UP (ref 10–45)
ANION GAP SERPL CALC-SCNC: 10 MMOL/L — SIGNIFICANT CHANGE UP (ref 5–17)
AST SERPL-CCNC: 14 U/L — SIGNIFICANT CHANGE UP (ref 10–40)
BASOPHILS # BLD AUTO: 0 K/UL — SIGNIFICANT CHANGE UP (ref 0–0.2)
BASOPHILS NFR BLD AUTO: 0.4 % — SIGNIFICANT CHANGE UP (ref 0–2)
BILIRUB SERPL-MCNC: 0.5 MG/DL — SIGNIFICANT CHANGE UP (ref 0.2–1.2)
BUN SERPL-MCNC: 10 MG/DL — SIGNIFICANT CHANGE UP (ref 7–23)
CALCIUM SERPL-MCNC: 10.2 MG/DL — SIGNIFICANT CHANGE UP (ref 8.4–10.5)
CHLORIDE SERPL-SCNC: 107 MMOL/L — SIGNIFICANT CHANGE UP (ref 96–108)
CO2 SERPL-SCNC: 26 MMOL/L — SIGNIFICANT CHANGE UP (ref 22–31)
CREAT SERPL-MCNC: 0.7 MG/DL — SIGNIFICANT CHANGE UP (ref 0.5–1.3)
EOSINOPHIL # BLD AUTO: 0.1 K/UL — SIGNIFICANT CHANGE UP (ref 0–0.5)
EOSINOPHIL NFR BLD AUTO: 1.9 % — SIGNIFICANT CHANGE UP (ref 0–6)
GLUCOSE SERPL-MCNC: 96 MG/DL — SIGNIFICANT CHANGE UP (ref 70–99)
HCT VFR BLD CALC: 23.3 % — LOW (ref 34.5–45)
HGB BLD-MCNC: 7.9 G/DL — LOW (ref 11.5–15.5)
LDH SERPL L TO P-CCNC: 193 U/L — SIGNIFICANT CHANGE UP (ref 50–242)
LYMPHOCYTES # BLD AUTO: 0.8 K/UL — LOW (ref 1–3.3)
LYMPHOCYTES # BLD AUTO: 26.7 % — SIGNIFICANT CHANGE UP (ref 13–44)
MAGNESIUM SERPL-MCNC: 2 MG/DL — SIGNIFICANT CHANGE UP (ref 1.6–2.6)
MCHC RBC-ENTMCNC: 31 PG — SIGNIFICANT CHANGE UP (ref 27–34)
MCHC RBC-ENTMCNC: 34.1 GM/DL — SIGNIFICANT CHANGE UP (ref 32–36)
MCV RBC AUTO: 91.1 FL — SIGNIFICANT CHANGE UP (ref 80–100)
MONOCYTES # BLD AUTO: 0.8 K/UL — SIGNIFICANT CHANGE UP (ref 0–0.9)
MONOCYTES NFR BLD AUTO: 25.2 % — HIGH (ref 2–14)
NEUTROPHILS # BLD AUTO: 1.4 K/UL — LOW (ref 1.8–7.4)
NEUTROPHILS NFR BLD AUTO: 45.8 % — SIGNIFICANT CHANGE UP (ref 43–77)
PHOSPHATE SERPL-MCNC: 3.1 MG/DL — SIGNIFICANT CHANGE UP (ref 2.5–4.5)
PLATELET # BLD AUTO: 44 K/UL — LOW (ref 150–400)
POTASSIUM SERPL-MCNC: 3.8 MMOL/L — SIGNIFICANT CHANGE UP (ref 3.5–5.3)
POTASSIUM SERPL-SCNC: 3.8 MMOL/L — SIGNIFICANT CHANGE UP (ref 3.5–5.3)
PROT SERPL-MCNC: 6.3 G/DL — SIGNIFICANT CHANGE UP (ref 6–8.3)
RBC # BLD: 2.56 M/UL — LOW (ref 3.8–5.2)
RBC # FLD: 17.3 % — HIGH (ref 10.3–14.5)
SODIUM SERPL-SCNC: 143 MMOL/L — SIGNIFICANT CHANGE UP (ref 135–145)
URATE SERPL-MCNC: 2.9 MG/DL — SIGNIFICANT CHANGE UP (ref 2.5–7)
WBC # BLD: 3.1 K/UL — LOW (ref 3.8–10.5)
WBC # FLD AUTO: 3.1 K/UL — LOW (ref 3.8–10.5)

## 2018-11-06 PROCEDURE — 93010 ELECTROCARDIOGRAM REPORT: CPT

## 2018-11-06 PROCEDURE — 99232 SBSQ HOSP IP/OBS MODERATE 35: CPT | Mod: GC

## 2018-11-06 RX ORDER — ACETAMINOPHEN 500 MG
650 TABLET ORAL ONCE
Qty: 0 | Refills: 0 | Status: COMPLETED | OUTPATIENT
Start: 2018-11-06 | End: 2018-11-06

## 2018-11-06 RX ORDER — DIPHENHYDRAMINE HCL 50 MG
50 CAPSULE ORAL ONCE
Qty: 0 | Refills: 0 | Status: COMPLETED | OUTPATIENT
Start: 2018-11-06 | End: 2018-11-06

## 2018-11-06 RX ORDER — HYDROCORTISONE 20 MG
100 TABLET ORAL ONCE
Qty: 0 | Refills: 0 | Status: COMPLETED | OUTPATIENT
Start: 2018-11-06 | End: 2018-11-06

## 2018-11-06 RX ORDER — INOTUZUMAB OZOGAMICIN 0.25 MG/ML
1.06 INJECTION, POWDER, LYOPHILIZED, FOR SOLUTION INTRAVENOUS ONCE
Qty: 0 | Refills: 0 | Status: COMPLETED | OUTPATIENT
Start: 2018-11-06 | End: 2018-11-06

## 2018-11-06 RX ADMIN — HYDROMORPHONE HYDROCHLORIDE 1.5 MILLIGRAM(S): 2 INJECTION INTRAMUSCULAR; INTRAVENOUS; SUBCUTANEOUS at 04:27

## 2018-11-06 RX ADMIN — HYDROMORPHONE HYDROCHLORIDE 1.5 MILLIGRAM(S): 2 INJECTION INTRAMUSCULAR; INTRAVENOUS; SUBCUTANEOUS at 03:57

## 2018-11-06 RX ADMIN — PANTOPRAZOLE SODIUM 40 MILLIGRAM(S): 20 TABLET, DELAYED RELEASE ORAL at 05:22

## 2018-11-06 RX ADMIN — Medication 100 MILLIGRAM(S): at 13:54

## 2018-11-06 RX ADMIN — HYDROMORPHONE HYDROCHLORIDE 1.5 MILLIGRAM(S): 2 INJECTION INTRAMUSCULAR; INTRAVENOUS; SUBCUTANEOUS at 09:13

## 2018-11-06 RX ADMIN — LEVETIRACETAM 500 MILLIGRAM(S): 250 TABLET, FILM COATED ORAL at 05:22

## 2018-11-06 RX ADMIN — Medication 650 MILLIGRAM(S): at 13:53

## 2018-11-06 RX ADMIN — SODIUM CHLORIDE 50 MILLILITER(S): 9 INJECTION INTRAMUSCULAR; INTRAVENOUS; SUBCUTANEOUS at 20:24

## 2018-11-06 RX ADMIN — Medication 1 APPLICATION(S): at 17:25

## 2018-11-06 RX ADMIN — INOTUZUMAB OZOGAMICIN 50 MILLIGRAM(S): 0.25 INJECTION, POWDER, LYOPHILIZED, FOR SOLUTION INTRAVENOUS at 14:27

## 2018-11-06 RX ADMIN — Medication 50 MILLIGRAM(S): at 13:54

## 2018-11-06 RX ADMIN — Medication 250 MILLIGRAM(S): at 23:16

## 2018-11-06 RX ADMIN — Medication 1 APPLICATION(S): at 05:21

## 2018-11-06 RX ADMIN — HYDROMORPHONE HYDROCHLORIDE 0.5 MILLIGRAM(S): 2 INJECTION INTRAMUSCULAR; INTRAVENOUS; SUBCUTANEOUS at 23:43

## 2018-11-06 RX ADMIN — Medication 1 TABLET(S): at 11:29

## 2018-11-06 RX ADMIN — HYDROMORPHONE HYDROCHLORIDE 1.5 MILLIGRAM(S): 2 INJECTION INTRAMUSCULAR; INTRAVENOUS; SUBCUTANEOUS at 20:24

## 2018-11-06 RX ADMIN — LEVETIRACETAM 500 MILLIGRAM(S): 250 TABLET, FILM COATED ORAL at 17:24

## 2018-11-06 RX ADMIN — HYDROMORPHONE HYDROCHLORIDE 1.5 MILLIGRAM(S): 2 INJECTION INTRAMUSCULAR; INTRAVENOUS; SUBCUTANEOUS at 08:58

## 2018-11-06 RX ADMIN — Medication 250 MILLIGRAM(S): at 10:32

## 2018-11-06 RX ADMIN — Medication 10 MILLIGRAM(S): at 17:25

## 2018-11-06 RX ADMIN — CEFEPIME 100 MILLIGRAM(S): 1 INJECTION, POWDER, FOR SOLUTION INTRAMUSCULAR; INTRAVENOUS at 05:21

## 2018-11-06 RX ADMIN — CEFEPIME 100 MILLIGRAM(S): 1 INJECTION, POWDER, FOR SOLUTION INTRAMUSCULAR; INTRAVENOUS at 21:57

## 2018-11-06 RX ADMIN — CEFEPIME 100 MILLIGRAM(S): 1 INJECTION, POWDER, FOR SOLUTION INTRAMUSCULAR; INTRAVENOUS at 13:38

## 2018-11-06 RX ADMIN — MICAFUNGIN SODIUM 105 MILLIGRAM(S): 100 INJECTION, POWDER, LYOPHILIZED, FOR SOLUTION INTRAVENOUS at 12:42

## 2018-11-06 RX ADMIN — HYDROMORPHONE HYDROCHLORIDE 1.5 MILLIGRAM(S): 2 INJECTION INTRAMUSCULAR; INTRAVENOUS; SUBCUTANEOUS at 20:54

## 2018-11-06 RX ADMIN — HYDROMORPHONE HYDROCHLORIDE 1.5 MILLIGRAM(S): 2 INJECTION INTRAMUSCULAR; INTRAVENOUS; SUBCUTANEOUS at 00:07

## 2018-11-06 NOTE — PROGRESS NOTE ADULT - ATTENDING COMMENTS
58F Ph(+) ALL s/p R HyperCVAD x 4 cycles IT MTX x2 s/p stem cell collection w/ Step 1(HD vp16 plus bernardino-c) regimen.  s/p Masha-Auto on 12/16/16. Pt was on Sprycel maintenance. 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 Has completed first cycle Inotuzumab.   -Headaches less, but still present. CT head(11/3)- SDH has nearly resolved.   -GI bleed has resolved, Continue po Protonix.  -Febrile on 11/2. CT c/a/p 10/16- Scattered and patchy ground glass opacities in the right upper and lower lobes, possibly infection.  Heterogeneous thickening of the endometrial cavity.  Antibiotics adjusted to Cefepime/Mycamine per ID service. BCx(11/2) from PICC growing coag negative staph, she was started on Vanco IV, ID followup. f/u repeat Cx's from PICC and peripherally  - goal plt ct >80K, but platelets are refractory to transfusion- receiving transfusion of 1/2 units over 3 hours twice a day. Keeping hb >7. Obtain HLA matched platelets as possible has already been requested.  - OOB, ambulation.  -Cycle 2 Inotuzumab due today - unable to give today as no drug available, will give tomorrow 58F Ph(+) ALL s/p R HyperCVAD x 4 cycles IT MTX x2 s/p stem cell collection w/ Step 1(HD vp16 plus bernardino-c) regimen.  s/p Masha-Auto on 12/16/16. Pt was on Sprycel maintenance. 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 Has completed first cycle Inotuzumab.   -Headaches less, but still present. CT head(11/3)- SDH has nearly resolved.   -GI bleed has resolved, Continue po Protonix.  -Febrile on 11/2. CT c/a/p 10/16- Scattered and patchy ground glass opacities in the right upper and lower lobes, possibly infection.  Heterogeneous thickening of the endometrial cavity.  Antibiotics adjusted to Cefepime/Mycamine per ID service. BCx(11/2) from PICC growing coag negative staph, she was started on Vanco IV, ID followup. f/u repeat Cx's from PICC and peripherally  - goal plt ct >80K, but platelets are refractory to transfusion- receiving transfusion of 1/2 units over 3 hours twice a day. Keeping hb >7. Obtain HLA matched platelets as possible has already been requested.  - OOB, ambulation.  -Cycle 2 Inotuzumab due today

## 2018-11-06 NOTE — PROGRESS NOTE ADULT - PROBLEM SELECTOR PLAN 1
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 Cycle 2 to begin 11/6 when medication is available   Plan for BM bx after second cycle  Monitor CBC/Lytes and transfuse/replete PRN  Thrombocytopenia with SDH: Patient platelet refractory, per blood bank patient to receive 1/2 bags over 3 hours q 12 hours when HLA not available   Strict Is and Os/Daily weights/Mouth Care  Antiemetics, IVF  - Thrombocytopenia: Platelets 1/2 unit over 3 hours every 12 hours (no HLA available today) 10/5 Peripheral flow confirms relapse with BCR/ABL rearrangement in 79.5% of cells  S/p Cycle 1 Inotuzamab (Day 1, 8 and 15). Cycle 2 to begin 11/6 when medication is available   Plan for BM bx after second cycle  Monitor CBC/Lytes and transfuse/replete PRN  Thrombocytopenia with SDH: Patient platelet refractory, per blood bank patient to receive 1/2 bags over 3 hours q 12 hours when HLA not available   Strict Is and Os/Daily weights/Mouth Care  Antiemetics, IVF

## 2018-11-06 NOTE — ADVANCED PRACTICE NURSE CONSULT - ASSESSMENT
Pt. seen in bed a/o x4,denies any discomfort at this time.Pt. verbalized understanding of chemotherapy infusion.Pt. has right double lumen PICC .Dsg dry and intact.Site with no s/s of bleeding,redness or swelling.With positive blood return noted and flushing easily with 10 ml NS.Drug verification done by 2 RN.s.Lab values reviewed on rounds by 2 RN.'s.Pt. premedicated with tylenol 650 mg po,benadryl 50 mg IV and hydrocortisone 100mg IVP.EKG done result seen by KEO Martinez NP.At 1427  BESPONSA 1.06 milliGRAM(s) in sodium chloride 0.9% 45.76 milliLiter(s), IV Intermittent, once, infuse over 60 Minutes infusing well to PICC via alaris pump.Administration Instructions: PROTECT FROM LIGHT.Pt. tolerating well.Primary RN aware of present treatment.Left pt. comforatble in bed.    .      .

## 2018-11-06 NOTE — CHART NOTE - NSCHARTNOTEFT_GEN_A_CORE
Neurosurgery was called regarding the possibility of de-escalating the patient's platelets goal to less than 80. Recent CTH from 11/3 showed nearly resolved interhemispheric SDH.    As per our note from 10/8  We recommend that the patient platelets remain >80 and not tapping the [place].    Patient should follow-up with Dr. Headely as outpatient 1-2 weeks after discharge.

## 2018-11-06 NOTE — PROGRESS NOTE ADULT - PROBLEM SELECTOR PLAN 3
10/4 CT Head revealed small bilateral acute on chronic convexity subdural hematomas. Repeat CT Head on 10/5, 10/6 and 10/11 is stable with 10/16 results showing SDH decreasing in size. Repeat CT Head 10/23 with some small area of bleeding into previous collection. Per Neuro Sx, repeat CT Head in 6 hours was unchanged  Continue Keppra 500mg BID for seizure ppx   Neuro checks q 4 hrs  Pain management   Neurosurgery following, no intervention at this time. Continue to keep platelet goal above 80K and do not tap Omaya until platelets are at a minimum of 50K as SDH may increase  Repeat Head CT for worsening headaches on 11/3 c/w nearly resolved subdural hematomas 10/4 CT Head revealed small bilateral acute on chronic convexity subdural hematomas. Repeat CT Head on 10/5, 10/6 and 10/11 is stable with 10/16 results showing SDH decreasing in size. Repeat CT Head 10/23 with some small area of bleeding into previous collection.   Continue Keppra 500mg BID for seizure ppx   Pain management   Neurosurgery following, no intervention at this time.   Repeat Head CT for worsening headaches on 11/3 c/w nearly resolved subdural hematomas

## 2018-11-06 NOTE — PROGRESS NOTE ADULT - SUBJECTIVE AND OBJECTIVE BOX
Diagnosis: Relapsed ALL Ph (+)     Protocol/Chemo Regimen: Cycle 2 Inotuzamab Days 1, 8 and 15    Day: 1    Pt endorsed: intermittent headaches; currently none now    Review of Systems: Denies nausea, vomiting, diarrhea, chest pain, SOB      Pain scale: 0    Diet: Regular    Allergies    No Known Drug Allergies  shellfish (Nausea; Vomiting)    Intolerances        ANTIMICROBIALS  cefepime   IVPB 2000 milliGRAM(s) IV Intermittent every 8 hours  micafungin IVPB 100 milliGRAM(s) IV Intermittent daily  vancomycin  IVPB 1000 milliGRAM(s) IV Intermittent every 12 hours      HEME/ONC MEDICATIONS  alteplase for catheter clearance 2 milliGRAM(s) Catheter once      STANDING MEDICATIONS  AQUAPHOR (petrolatum Ointment) 1 Application(s) Topical two times a day  influenza   Vaccine 0.5 milliLiter(s) IntraMuscular once  levETIRAcetam 500 milliGRAM(s) Oral two times a day  multivitamin 1 Tablet(s) Oral daily  pantoprazole    Tablet 40 milliGRAM(s) Oral before breakfast  sodium chloride 0.9% lock flush 20 milliLiter(s) IV Push once  sodium chloride 0.9%. 1000 milliLiter(s) IV Continuous <Continuous>      PRN MEDICATIONS  acetaminophen   Tablet .. 650 milliGRAM(s) Oral every 6 hours PRN  acetaminophen   Tablet .. 650 milliGRAM(s) Oral every 6 hours PRN  HYDROmorphone  Injectable 1.5 milliGRAM(s) IV Push every 4 hours PRN  HYDROmorphone  Injectable 0.5 milliGRAM(s) IV Push every 4 hours PRN  metoclopramide Injectable 10 milliGRAM(s) IV Push every 6 hours PRN  sodium chloride 0.9% lock flush 10 milliLiter(s) IV Push every 1 hour PRN  sodium chloride 0.9% lock flush 10 milliLiter(s) IV Push every 12 hours PRN        Vital Signs Last 24 Hrs  T(C): 36.6 (06 Nov 2018 05:15), Max: 37.1 (05 Nov 2018 17:16)  T(F): 97.8 (06 Nov 2018 05:15), Max: 98.8 (05 Nov 2018 17:16)  HR: 82 (06 Nov 2018 05:15) (73 - 93)  BP: 111/73 (06 Nov 2018 05:15) (111/73 - 144/92)  BP(mean): --  RR: 18 (06 Nov 2018 05:15) (18 - 18)  SpO2: 100% (06 Nov 2018 05:15) (99% - 100%)    PHYSICAL EXAM  General: adult in NAD  HEENT: clear oropharynx, anicteric sclera, pink conjunctiva  Neck: supple  CV: normal S1/S2 RRR  Lungs: positive air movement b/l ant lungs,clear to auscultation, no wheezes, no rales  Abdomen: soft non-tender non-distended  Ext: no clubbing cyanosis or edema  Skin: no rashes and no petechiae  Neuro: alert and oriented X 4, no focal deficits  Central Line: RUE PICC c/d/i    RECENT CULTURES:  11-03 @ 08:39  .Blood Blood-Peripheral  --  --  --    No growth to date.  --  11-03 @ 03:15  .Urine Clean Catch (Midstream)  --  --  --    No growth  --  11-02 @ 22:30  .Blood PICC/PERC Double Lumen BLUE  Blood Culture PCR  Blood Culture PCR  PCR    Growth in anaerobic bottle: Coag Negative Staphylococcus  "Susceptibilities not performed"  "Due to technical problems, Proteus sp. will Not be reported as part of  the BCID panel until further notice"  ***Blood Panel PCR results on this specimen are available  approximately 3 hours after the Gram stain result.***  Gram stain, PCR, and/or culture results may not always  correspond due to difference in methodologies.  ************************************************************  This PCR assay was performed using [employer].  The following targets are tested for: Enterococcus,  vancomycin resistant enterococci, Listeria monocytogenes,  coagulase negative staphylococci, S. aureus,  methicillin resistant S. aureus, Streptococcus agalactiae  (Group B), S. pneumoniae, S. pyogenes (Group A),  Acinetobacter baumannii, Enterobacter cloacae, E. coli,  Klebsiella oxytoca, K. pneumoniae, Proteus sp.,  Serratia marcescens, Haemophilus influenzae,  Neisseria meningitidis, Pseudomonas aeruginosa, Candida  albicans, C. glabrata, C krusei, C parapsilosis,  C. tropicalis and the KPC resistance gene.  --        LABS: Diagnosis: Relapsed ALL Ph (+)     Protocol/Chemo Regimen: Cycle 2 Inotuzamab Days 1, 8 and 15    Day: 1    Pt endorsed: no complaints    Review of Systems: Denies nausea, vomiting, diarrhea, chest pain, SOB      Pain scale: 0    Diet: Regular    Allergies    No Known Drug Allergies  shellfish (Nausea; Vomiting)    Intolerances        ANTIMICROBIALS  cefepime   IVPB 2000 milliGRAM(s) IV Intermittent every 8 hours  micafungin IVPB 100 milliGRAM(s) IV Intermittent daily  vancomycin  IVPB 1000 milliGRAM(s) IV Intermittent every 12 hours      HEME/ONC MEDICATIONS  alteplase for catheter clearance 2 milliGRAM(s) Catheter once      STANDING MEDICATIONS  AQUAPHOR (petrolatum Ointment) 1 Application(s) Topical two times a day  influenza   Vaccine 0.5 milliLiter(s) IntraMuscular once  levETIRAcetam 500 milliGRAM(s) Oral two times a day  multivitamin 1 Tablet(s) Oral daily  pantoprazole    Tablet 40 milliGRAM(s) Oral before breakfast  sodium chloride 0.9% lock flush 20 milliLiter(s) IV Push once  sodium chloride 0.9%. 1000 milliLiter(s) IV Continuous <Continuous>      PRN MEDICATIONS  acetaminophen   Tablet .. 650 milliGRAM(s) Oral every 6 hours PRN  acetaminophen   Tablet .. 650 milliGRAM(s) Oral every 6 hours PRN  HYDROmorphone  Injectable 1.5 milliGRAM(s) IV Push every 4 hours PRN  HYDROmorphone  Injectable 0.5 milliGRAM(s) IV Push every 4 hours PRN  metoclopramide Injectable 10 milliGRAM(s) IV Push every 6 hours PRN  sodium chloride 0.9% lock flush 10 milliLiter(s) IV Push every 1 hour PRN  sodium chloride 0.9% lock flush 10 milliLiter(s) IV Push every 12 hours PRN        Vital Signs Last 24 Hrs  T(C): 36.6 (06 Nov 2018 05:15), Max: 37.1 (05 Nov 2018 17:16)  T(F): 97.8 (06 Nov 2018 05:15), Max: 98.8 (05 Nov 2018 17:16)  HR: 82 (06 Nov 2018 05:15) (73 - 93)  BP: 111/73 (06 Nov 2018 05:15) (111/73 - 144/92)  BP(mean): --  RR: 18 (06 Nov 2018 05:15) (18 - 18)  SpO2: 100% (06 Nov 2018 05:15) (99% - 100%)    PHYSICAL EXAM  General: adult in NAD  HEENT: clear oropharynx,  CV: normal S1/S2 RRR  Lungs: positive air movement, CTA  Abdomen: soft non-tender non-distended  Ext: trace pedal edema  Skin: no rashes and no petechiae  Neuro: alert and oriented X 4, no focal deficits  Central Line: RUE PICC c/d/i    RECENT CULTURES:  11-03 @ 08:39  .Blood Blood-Peripheral  --  --  --    No growth to date.  --  11-03 @ 03:15  .Urine Clean Catch (Midstream)  --  --  --    No growth  --  11-02 @ 22:30  .Blood PICC/PERC Double Lumen BLUE  Blood Culture PCR  Blood Culture PCR  PCR    Growth in anaerobic bottle: Coag Negative Staphylococcus  "Susceptibilities not performed"  "Due to technical problems, Proteus sp. will Not be reported as part of  the BCID panel until further notice"  ***Blood Panel PCR results on this specimen are available  approximately 3 hours after the Gram stain result.***  Gram stain, PCR, and/or culture results may not always  correspond due to difference in methodologies.  ************************************************************  This PCR assay was performed using [employer].  The following targets are tested for: Enterococcus,  vancomycin resistant enterococci, Listeria monocytogenes,  coagulase negative staphylococci, S. aureus,  methicillin resistant S. aureus, Streptococcus agalactiae  (Group B), S. pneumoniae, S. pyogenes (Group A),  Acinetobacter baumannii, Enterobacter cloacae, E. coli,  Klebsiella oxytoca, K. pneumoniae, Proteus sp.,  Serratia marcescens, Haemophilus influenzae,  Neisseria meningitidis, Pseudomonas aeruginosa, Candida  albicans, C. glabrata, C krusei, C parapsilosis,  C. tropicalis and the KPC resistance gene.  --        LABS:  LABS:    Blood Cultures:                           7.9    3.1   )-----------( 44       ( 06 Nov 2018 06:56 )             23.3         Mean Cell Volume : 91.1 fl  Mean Cell Hemoglobin : 31.0 pg  Mean Cell Hemoglobin Concentration : 34.1 gm/dL  Auto Neutrophil # : 1.4 K/uL  Auto Lymphocyte # : 0.8 K/uL  Auto Monocyte # : 0.8 K/uL  Auto Eosinophil # : 0.1 K/uL  Auto Basophil # : 0.0 K/uL  Auto Neutrophil % : 45.8 %  Auto Lymphocyte % : 26.7 %  Auto Monocyte % : 25.2 %  Auto Eosinophil % : 1.9 %  Auto Basophil % : 0.4 %      11-06    143  |  107  |  10  ----------------------------<  96  3.8   |  26  |  0.70    Ca    10.2      06 Nov 2018 06:56  Phos  3.1     11-06  Mg     2.0     11-06    TPro  6.3  /  Alb  3.8  /  TBili  0.5  /  DBili  x   /  AST  14  /  ALT  19  /  AlkPhos  133<H>  11-06      Mg 2.0  Phos 3.1      PT/INR - ( 05 Nov 2018 06:57 )   PT: 11.4 sec;   INR: 0.99 ratio         PTT - ( 05 Nov 2018 06:57 )  PTT:20.8 sec      Uric Acid 2.9

## 2018-11-07 ENCOUNTER — LABORATORY RESULT (OUTPATIENT)
Age: 58
End: 2018-11-07

## 2018-11-07 LAB
ALBUMIN SERPL ELPH-MCNC: 3.8 G/DL — SIGNIFICANT CHANGE UP (ref 3.3–5)
ALP SERPL-CCNC: 121 U/L — HIGH (ref 40–120)
ALT FLD-CCNC: 17 U/L — SIGNIFICANT CHANGE UP (ref 10–45)
ANION GAP SERPL CALC-SCNC: 12 MMOL/L — SIGNIFICANT CHANGE UP (ref 5–17)
AST SERPL-CCNC: 12 U/L — SIGNIFICANT CHANGE UP (ref 10–40)
BASOPHILS # BLD AUTO: 0 K/UL — SIGNIFICANT CHANGE UP (ref 0–0.2)
BASOPHILS NFR BLD AUTO: 0.6 % — SIGNIFICANT CHANGE UP (ref 0–2)
BILIRUB SERPL-MCNC: 0.5 MG/DL — SIGNIFICANT CHANGE UP (ref 0.2–1.2)
BUN SERPL-MCNC: 11 MG/DL — SIGNIFICANT CHANGE UP (ref 7–23)
CALCIUM SERPL-MCNC: 10 MG/DL — SIGNIFICANT CHANGE UP (ref 8.4–10.5)
CHLORIDE SERPL-SCNC: 106 MMOL/L — SIGNIFICANT CHANGE UP (ref 96–108)
CO2 SERPL-SCNC: 24 MMOL/L — SIGNIFICANT CHANGE UP (ref 22–31)
CREAT SERPL-MCNC: 0.64 MG/DL — SIGNIFICANT CHANGE UP (ref 0.5–1.3)
EOSINOPHIL # BLD AUTO: 0.1 K/UL — SIGNIFICANT CHANGE UP (ref 0–0.5)
EOSINOPHIL NFR BLD AUTO: 1.6 % — SIGNIFICANT CHANGE UP (ref 0–6)
GLUCOSE SERPL-MCNC: 89 MG/DL — SIGNIFICANT CHANGE UP (ref 70–99)
HCT VFR BLD CALC: 22.3 % — LOW (ref 34.5–45)
HGB BLD-MCNC: 8 G/DL — LOW (ref 11.5–15.5)
LDH SERPL L TO P-CCNC: 178 U/L — SIGNIFICANT CHANGE UP (ref 50–242)
LYMPHOCYTES # BLD AUTO: 1.5 K/UL — SIGNIFICANT CHANGE UP (ref 1–3.3)
LYMPHOCYTES # BLD AUTO: 32.9 % — SIGNIFICANT CHANGE UP (ref 13–44)
MAGNESIUM SERPL-MCNC: 2.1 MG/DL — SIGNIFICANT CHANGE UP (ref 1.6–2.6)
MCHC RBC-ENTMCNC: 32.8 PG — SIGNIFICANT CHANGE UP (ref 27–34)
MCHC RBC-ENTMCNC: 36 GM/DL — SIGNIFICANT CHANGE UP (ref 32–36)
MCV RBC AUTO: 91 FL — SIGNIFICANT CHANGE UP (ref 80–100)
MONOCYTES # BLD AUTO: 1 K/UL — HIGH (ref 0–0.9)
MONOCYTES NFR BLD AUTO: 23.7 % — HIGH (ref 2–14)
NEUTROPHILS # BLD AUTO: 1.8 K/UL — SIGNIFICANT CHANGE UP (ref 1.8–7.4)
NEUTROPHILS NFR BLD AUTO: 41.2 % — LOW (ref 43–77)
PHOSPHATE SERPL-MCNC: 3.1 MG/DL — SIGNIFICANT CHANGE UP (ref 2.5–4.5)
PLATELET # BLD AUTO: 57 K/UL — LOW (ref 150–400)
POTASSIUM SERPL-MCNC: 3.5 MMOL/L — SIGNIFICANT CHANGE UP (ref 3.5–5.3)
POTASSIUM SERPL-SCNC: 3.5 MMOL/L — SIGNIFICANT CHANGE UP (ref 3.5–5.3)
PROT SERPL-MCNC: 6.4 G/DL — SIGNIFICANT CHANGE UP (ref 6–8.3)
RBC # BLD: 2.45 M/UL — LOW (ref 3.8–5.2)
RBC # FLD: 17.3 % — HIGH (ref 10.3–14.5)
SODIUM SERPL-SCNC: 142 MMOL/L — SIGNIFICANT CHANGE UP (ref 135–145)
URATE SERPL-MCNC: 2.7 MG/DL — SIGNIFICANT CHANGE UP (ref 2.5–7)
WBC # BLD: 4.4 K/UL — SIGNIFICANT CHANGE UP (ref 3.8–10.5)
WBC # FLD AUTO: 4.4 K/UL — SIGNIFICANT CHANGE UP (ref 3.8–10.5)

## 2018-11-07 PROCEDURE — 99232 SBSQ HOSP IP/OBS MODERATE 35: CPT | Mod: GC

## 2018-11-07 RX ADMIN — LEVETIRACETAM 500 MILLIGRAM(S): 250 TABLET, FILM COATED ORAL at 06:13

## 2018-11-07 RX ADMIN — HYDROMORPHONE HYDROCHLORIDE 1.5 MILLIGRAM(S): 2 INJECTION INTRAMUSCULAR; INTRAVENOUS; SUBCUTANEOUS at 21:42

## 2018-11-07 RX ADMIN — HYDROMORPHONE HYDROCHLORIDE 1.5 MILLIGRAM(S): 2 INJECTION INTRAMUSCULAR; INTRAVENOUS; SUBCUTANEOUS at 07:02

## 2018-11-07 RX ADMIN — HYDROMORPHONE HYDROCHLORIDE 1.5 MILLIGRAM(S): 2 INJECTION INTRAMUSCULAR; INTRAVENOUS; SUBCUTANEOUS at 00:25

## 2018-11-07 RX ADMIN — MICAFUNGIN SODIUM 105 MILLIGRAM(S): 100 INJECTION, POWDER, LYOPHILIZED, FOR SOLUTION INTRAVENOUS at 11:44

## 2018-11-07 RX ADMIN — HYDROMORPHONE HYDROCHLORIDE 0.5 MILLIGRAM(S): 2 INJECTION INTRAMUSCULAR; INTRAVENOUS; SUBCUTANEOUS at 17:13

## 2018-11-07 RX ADMIN — HYDROMORPHONE HYDROCHLORIDE 1.5 MILLIGRAM(S): 2 INJECTION INTRAMUSCULAR; INTRAVENOUS; SUBCUTANEOUS at 16:33

## 2018-11-07 RX ADMIN — HYDROMORPHONE HYDROCHLORIDE 1.5 MILLIGRAM(S): 2 INJECTION INTRAMUSCULAR; INTRAVENOUS; SUBCUTANEOUS at 06:32

## 2018-11-07 RX ADMIN — CEFEPIME 100 MILLIGRAM(S): 1 INJECTION, POWDER, FOR SOLUTION INTRAMUSCULAR; INTRAVENOUS at 21:41

## 2018-11-07 RX ADMIN — HYDROMORPHONE HYDROCHLORIDE 1.5 MILLIGRAM(S): 2 INJECTION INTRAMUSCULAR; INTRAVENOUS; SUBCUTANEOUS at 11:23

## 2018-11-07 RX ADMIN — HYDROMORPHONE HYDROCHLORIDE 1.5 MILLIGRAM(S): 2 INJECTION INTRAMUSCULAR; INTRAVENOUS; SUBCUTANEOUS at 11:49

## 2018-11-07 RX ADMIN — PANTOPRAZOLE SODIUM 40 MILLIGRAM(S): 20 TABLET, DELAYED RELEASE ORAL at 06:13

## 2018-11-07 RX ADMIN — LEVETIRACETAM 500 MILLIGRAM(S): 250 TABLET, FILM COATED ORAL at 17:18

## 2018-11-07 RX ADMIN — Medication 250 MILLIGRAM(S): at 23:58

## 2018-11-07 RX ADMIN — HYDROMORPHONE HYDROCHLORIDE 0.5 MILLIGRAM(S): 2 INJECTION INTRAMUSCULAR; INTRAVENOUS; SUBCUTANEOUS at 17:16

## 2018-11-07 RX ADMIN — Medication 250 MILLIGRAM(S): at 11:44

## 2018-11-07 RX ADMIN — CEFEPIME 100 MILLIGRAM(S): 1 INJECTION, POWDER, FOR SOLUTION INTRAMUSCULAR; INTRAVENOUS at 15:44

## 2018-11-07 RX ADMIN — CEFEPIME 100 MILLIGRAM(S): 1 INJECTION, POWDER, FOR SOLUTION INTRAMUSCULAR; INTRAVENOUS at 06:13

## 2018-11-07 RX ADMIN — HYDROMORPHONE HYDROCHLORIDE 1.5 MILLIGRAM(S): 2 INJECTION INTRAMUSCULAR; INTRAVENOUS; SUBCUTANEOUS at 17:08

## 2018-11-07 RX ADMIN — HYDROMORPHONE HYDROCHLORIDE 1.5 MILLIGRAM(S): 2 INJECTION INTRAMUSCULAR; INTRAVENOUS; SUBCUTANEOUS at 22:12

## 2018-11-07 RX ADMIN — HYDROMORPHONE HYDROCHLORIDE 1.5 MILLIGRAM(S): 2 INJECTION INTRAMUSCULAR; INTRAVENOUS; SUBCUTANEOUS at 00:55

## 2018-11-07 RX ADMIN — Medication 1 TABLET(S): at 11:46

## 2018-11-07 RX ADMIN — Medication 1 APPLICATION(S): at 17:18

## 2018-11-07 RX ADMIN — HYDROMORPHONE HYDROCHLORIDE 0.5 MILLIGRAM(S): 2 INJECTION INTRAMUSCULAR; INTRAVENOUS; SUBCUTANEOUS at 00:13

## 2018-11-07 RX ADMIN — Medication 1 APPLICATION(S): at 06:15

## 2018-11-07 NOTE — PROGRESS NOTE ADULT - PROBLEM SELECTOR PLAN 1
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  now on cycle Cycle 2   Plan for BM bx after second cycle  Monitor CBC/Lytes and transfuse/replete PRN  Thrombocytopenia with SDH: Patient platelet refractory, per blood bank patient to receive 1/2 bags over 3 hours q 12 hours when HLA not available   Strict Is and Os/Daily weights/Mouth Care  Antiemetics, IVF

## 2018-11-07 NOTE — PROGRESS NOTE ADULT - SUBJECTIVE AND OBJECTIVE BOX
Diagnosis: Relapsed ALL Ph (+)     Protocol/Chemo Regimen: Cycle 2 Inotuzamab Days 1, 8 and 15    Day: 2    Pt endorsed: no complaints    Review of Systems: Denies nausea, vomiting, diarrhea, chest pain, SOB      Pain scale: 0    Diet: Regular    Allergies: No Known Drug Allergies  shellfish (Nausea; Vomiting)      ANTIMICROBIALS  cefepime   IVPB 2000 milliGRAM(s) IV Intermittent every 8 hours  micafungin IVPB 100 milliGRAM(s) IV Intermittent daily  vancomycin  IVPB 1000 milliGRAM(s) IV Intermittent every 12 hours      HEME/ONC MEDICATIONS  alteplase for catheter clearance 2 milliGRAM(s) Catheter once      STANDING MEDICATIONS  AQUAPHOR (petrolatum Ointment) 1 Application(s) Topical two times a day  influenza   Vaccine 0.5 milliLiter(s) IntraMuscular once  levETIRAcetam 500 milliGRAM(s) Oral two times a day  multivitamin 1 Tablet(s) Oral daily  pantoprazole    Tablet 40 milliGRAM(s) Oral before breakfast  sodium chloride 0.9% lock flush 20 milliLiter(s) IV Push once  sodium chloride 0.9%. 1000 milliLiter(s) IV Continuous <Continuous>      PRN MEDICATIONS  acetaminophen   Tablet .. 650 milliGRAM(s) Oral every 6 hours PRN  acetaminophen   Tablet .. 650 milliGRAM(s) Oral every 6 hours PRN  HYDROmorphone  Injectable 1.5 milliGRAM(s) IV Push every 4 hours PRN  HYDROmorphone  Injectable 0.5 milliGRAM(s) IV Push every 4 hours PRN  metoclopramide Injectable 10 milliGRAM(s) IV Push every 6 hours PRN  sodium chloride 0.9% lock flush 10 milliLiter(s) IV Push every 1 hour PRN  sodium chloride 0.9% lock flush 10 milliLiter(s) IV Push every 12 hours PRN      Vital Signs Last 24 Hrs  T(C): 37.1 (07 Nov 2018 05:28), Max: 37.3 (06 Nov 2018 23:30)  T(F): 98.7 (07 Nov 2018 05:28), Max: 99.1 (06 Nov 2018 23:30)  HR: 81 (07 Nov 2018 05:28) (75 - 105)  BP: 139/85 (07 Nov 2018 05:28) (131/87 - 159/87)  RR: 18 (07 Nov 2018 05:28) (18 - 18)  SpO2: 100% (07 Nov 2018 05:28) (96% - 100%)    PHYSICAL EXAM  General: adult in NAD  HEENT: clear oropharynx, anicteric sclera, pink conjunctiva  Neck: supple  CV: normal S1/S2 RRR  Lungs: positive air movement b/l ant lungs,clear to auscultation, no wheezes, no rales  Abdomen: soft non-tender non-distended  Ext: no clubbing cyanosis or edema  Skin: no rashes and no petechiae  Neuro: alert and oriented X 4, no focal deficits  Central Line: RUE PICC c/d/i    LABS:    Cultures:     Culture - Blood in AM (11.05.18 @ 08:42)    Specimen Source: .Blood Blood-Peripheral    Culture Results:   No growth to date.    Culture - Blood in AM (11.05.18 @ 08:42)    Specimen Source: .Blood Blood-Catheter    Culture Results:   No growth to date.                          8.0    4.4   )-----------( 57       ( 07 Nov 2018 06:51 )             22.3       Mean Cell Volume : 91.0 fl  Mean Cell Hemoglobin : 32.8 pg  Mean Cell Hemoglobin Concentration : 36.0 gm/dL  Auto Neutrophil # : x  Auto Lymphocyte # : x  Auto Monocyte # : x  Auto Eosinophil # : x  Auto Basophil # : x  Auto Neutrophil % : x  Auto Lymphocyte % : x  Auto Monocyte % : x  Auto Eosinophil % : x  Auto Basophil % : x      11-07    142  |  106  |  11  ----------------------------<  89  3.5   |  24  |  0.64    Ca    10.0      07 Nov 2018 06:51  Phos  3.1     11-07  Mg     2.1     11-07    TPro  6.4  /  Alb  3.8  /  TBili  0.5  /  DBili  x   /  AST  12  /  ALT  17  /  AlkPhos  121<H>  11-07      Mg 2.1  Phos 3.1      Uric Acid 2.7      RADIOLOGY & ADDITIONAL STUDIES:    from: CT Head No Cont (11.03.18 @ 15:56)     Impression: Stable ventricular size. The previously seen posterior   interhemispheric thin subdural hematoma has nearly resolved.

## 2018-11-07 NOTE — PROGRESS NOTE ADULT - ATTENDING COMMENTS
58F Ph(+) ALL s/p R HyperCVAD x 4 cycles IT MTX x2 s/p stem cell collection w/ Step 1(HD vp16 plus bernardino-c) regimen.  s/p Masha-Auto on 12/16/16. Pt was on Sprycel maintenance. 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 Has completed first cycle Inotuzumab.   -Headaches less, but still present. CT head(11/3)- SDH has nearly resolved.   -GI bleed has resolved, Continue po Protonix.  -Febrile on 11/2. CT c/a/p 10/16- Scattered and patchy ground glass opacities in the right upper and lower lobes, possibly infection.  Heterogeneous thickening of the endometrial cavity.  Antibiotics adjusted to Cefepime/Mycamine per ID service. BCx(11/2) from PICC growing coag negative staph, she was started on Vanco IV, ID followup. f/u repeat Cx's from PICC and peripherally  - goal plt ct >80K, but platelets are refractory to transfusion- receiving transfusion of 1/2 units over 3 hours twice a day. Keeping hb >7. Obtain HLA matched platelets as possible has already been requested.  - OOB, ambulation.  -Cycle 2 Inotuzumab due today 58F Ph(+) ALL s/p R HyperCVAD x 4 cycles IT MTX x2 s/p stem cell collection w/ Step 1(HD vp16 plus bernardino-c) regimen.  s/p Masha-Auto on 12/16/16. Pt was on Sprycel maintenance. 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 Has completed first cycle Inotuzumab.   -Headaches less, but still present. CT head(11/3)- SDH has nearly resolved.   -GI bleed has resolved, Continue po Protonix.  -Febrile on 11/2. CT c/a/p 10/16- Scattered and patchy ground glass opacities in the right upper and lower lobes, possibly infection.  Heterogeneous thickening of the endometrial cavity.  Antibiotics adjusted to Cefepime/Mycamine per ID service. BCx (11/2) from PICC growing coag negative staph, she was started on Vanco IV, ID followup. f/u repeat Cx's from PICC and peripherally NGTD  - goal plt ct >80K, but platelets are refractory to transfusion- receiving transfusion of 1/2 units over 3 hours twice a day. Keeping hb >7. Obtain HLA matched platelets as possible has already been requested.  - OOB, ambulation.  - Cycle 2 Inotuzumab started 11/6

## 2018-11-08 LAB
ALBUMIN SERPL ELPH-MCNC: 3.6 G/DL — SIGNIFICANT CHANGE UP (ref 3.3–5)
ALP SERPL-CCNC: 117 U/L — SIGNIFICANT CHANGE UP (ref 40–120)
ALT FLD-CCNC: 19 U/L — SIGNIFICANT CHANGE UP (ref 10–45)
ANION GAP SERPL CALC-SCNC: 10 MMOL/L — SIGNIFICANT CHANGE UP (ref 5–17)
APTT BLD: 29.4 SEC — SIGNIFICANT CHANGE UP (ref 27.5–36.3)
AST SERPL-CCNC: 12 U/L — SIGNIFICANT CHANGE UP (ref 10–40)
BASOPHILS # BLD AUTO: 0 K/UL — SIGNIFICANT CHANGE UP (ref 0–0.2)
BASOPHILS NFR BLD AUTO: 1 % — SIGNIFICANT CHANGE UP (ref 0–2)
BILIRUB SERPL-MCNC: 0.5 MG/DL — SIGNIFICANT CHANGE UP (ref 0.2–1.2)
BUN SERPL-MCNC: 11 MG/DL — SIGNIFICANT CHANGE UP (ref 7–23)
CALCIUM SERPL-MCNC: 10.2 MG/DL — SIGNIFICANT CHANGE UP (ref 8.4–10.5)
CHLORIDE SERPL-SCNC: 104 MMOL/L — SIGNIFICANT CHANGE UP (ref 96–108)
CO2 SERPL-SCNC: 24 MMOL/L — SIGNIFICANT CHANGE UP (ref 22–31)
CREAT SERPL-MCNC: 0.63 MG/DL — SIGNIFICANT CHANGE UP (ref 0.5–1.3)
CULTURE RESULTS: SIGNIFICANT CHANGE UP
EOSINOPHIL # BLD AUTO: 0.1 K/UL — SIGNIFICANT CHANGE UP (ref 0–0.5)
EOSINOPHIL NFR BLD AUTO: 2.1 % — SIGNIFICANT CHANGE UP (ref 0–6)
FIBRINOGEN PPP-MCNC: 438 MG/DL — SIGNIFICANT CHANGE UP (ref 350–510)
GLUCOSE SERPL-MCNC: 103 MG/DL — HIGH (ref 70–99)
HCT VFR BLD CALC: 22.7 % — LOW (ref 34.5–45)
HGB BLD-MCNC: 7.9 G/DL — LOW (ref 11.5–15.5)
INR BLD: 0.97 RATIO — SIGNIFICANT CHANGE UP (ref 0.88–1.16)
LDH SERPL L TO P-CCNC: 188 U/L — SIGNIFICANT CHANGE UP (ref 50–242)
LYMPHOCYTES # BLD AUTO: 1.1 K/UL — SIGNIFICANT CHANGE UP (ref 1–3.3)
LYMPHOCYTES # BLD AUTO: 28.5 % — SIGNIFICANT CHANGE UP (ref 13–44)
MAGNESIUM SERPL-MCNC: 2 MG/DL — SIGNIFICANT CHANGE UP (ref 1.6–2.6)
MCHC RBC-ENTMCNC: 32 PG — SIGNIFICANT CHANGE UP (ref 27–34)
MCHC RBC-ENTMCNC: 34.8 GM/DL — SIGNIFICANT CHANGE UP (ref 32–36)
MCV RBC AUTO: 91.9 FL — SIGNIFICANT CHANGE UP (ref 80–100)
MONOCYTES # BLD AUTO: 1.1 K/UL — HIGH (ref 0–0.9)
MONOCYTES NFR BLD AUTO: 27.6 % — HIGH (ref 2–14)
NEUTROPHILS # BLD AUTO: 1.6 K/UL — LOW (ref 1.8–7.4)
NEUTROPHILS NFR BLD AUTO: 40.9 % — LOW (ref 43–77)
PHOSPHATE SERPL-MCNC: 3.7 MG/DL — SIGNIFICANT CHANGE UP (ref 2.5–4.5)
PLATELET # BLD AUTO: 53 K/UL — LOW (ref 150–400)
POTASSIUM SERPL-MCNC: 3.6 MMOL/L — SIGNIFICANT CHANGE UP (ref 3.5–5.3)
POTASSIUM SERPL-SCNC: 3.6 MMOL/L — SIGNIFICANT CHANGE UP (ref 3.5–5.3)
PROT SERPL-MCNC: 6.1 G/DL — SIGNIFICANT CHANGE UP (ref 6–8.3)
PROTHROM AB SERPL-ACNC: 11 SEC — SIGNIFICANT CHANGE UP (ref 10–12.9)
RBC # BLD: 2.47 M/UL — LOW (ref 3.8–5.2)
RBC # FLD: 18 % — HIGH (ref 10.3–14.5)
SODIUM SERPL-SCNC: 138 MMOL/L — SIGNIFICANT CHANGE UP (ref 135–145)
SPECIMEN SOURCE: SIGNIFICANT CHANGE UP
URATE SERPL-MCNC: 2.8 MG/DL — SIGNIFICANT CHANGE UP (ref 2.5–7)
WBC # BLD: 3.9 K/UL — SIGNIFICANT CHANGE UP (ref 3.8–10.5)
WBC # FLD AUTO: 3.9 K/UL — SIGNIFICANT CHANGE UP (ref 3.8–10.5)

## 2018-11-08 PROCEDURE — 99232 SBSQ HOSP IP/OBS MODERATE 35: CPT | Mod: GC

## 2018-11-08 PROCEDURE — 99232 SBSQ HOSP IP/OBS MODERATE 35: CPT

## 2018-11-08 RX ORDER — HYDROMORPHONE HYDROCHLORIDE 2 MG/ML
0.5 INJECTION INTRAMUSCULAR; INTRAVENOUS; SUBCUTANEOUS EVERY 4 HOURS
Qty: 0 | Refills: 0 | Status: DISCONTINUED | OUTPATIENT
Start: 2018-11-08 | End: 2018-11-12

## 2018-11-08 RX ORDER — HYDROMORPHONE HYDROCHLORIDE 2 MG/ML
1.5 INJECTION INTRAMUSCULAR; INTRAVENOUS; SUBCUTANEOUS EVERY 4 HOURS
Qty: 0 | Refills: 0 | Status: DISCONTINUED | OUTPATIENT
Start: 2018-11-08 | End: 2018-11-12

## 2018-11-08 RX ADMIN — CEFEPIME 100 MILLIGRAM(S): 1 INJECTION, POWDER, FOR SOLUTION INTRAMUSCULAR; INTRAVENOUS at 22:24

## 2018-11-08 RX ADMIN — CEFEPIME 100 MILLIGRAM(S): 1 INJECTION, POWDER, FOR SOLUTION INTRAMUSCULAR; INTRAVENOUS at 13:45

## 2018-11-08 RX ADMIN — HYDROMORPHONE HYDROCHLORIDE 1.5 MILLIGRAM(S): 2 INJECTION INTRAMUSCULAR; INTRAVENOUS; SUBCUTANEOUS at 06:06

## 2018-11-08 RX ADMIN — HYDROMORPHONE HYDROCHLORIDE 1.5 MILLIGRAM(S): 2 INJECTION INTRAMUSCULAR; INTRAVENOUS; SUBCUTANEOUS at 02:38

## 2018-11-08 RX ADMIN — PANTOPRAZOLE SODIUM 40 MILLIGRAM(S): 20 TABLET, DELAYED RELEASE ORAL at 05:08

## 2018-11-08 RX ADMIN — HYDROMORPHONE HYDROCHLORIDE 1.5 MILLIGRAM(S): 2 INJECTION INTRAMUSCULAR; INTRAVENOUS; SUBCUTANEOUS at 15:38

## 2018-11-08 RX ADMIN — HYDROMORPHONE HYDROCHLORIDE 1.5 MILLIGRAM(S): 2 INJECTION INTRAMUSCULAR; INTRAVENOUS; SUBCUTANEOUS at 02:10

## 2018-11-08 RX ADMIN — LEVETIRACETAM 500 MILLIGRAM(S): 250 TABLET, FILM COATED ORAL at 17:58

## 2018-11-08 RX ADMIN — HYDROMORPHONE HYDROCHLORIDE 1.5 MILLIGRAM(S): 2 INJECTION INTRAMUSCULAR; INTRAVENOUS; SUBCUTANEOUS at 20:15

## 2018-11-08 RX ADMIN — CEFEPIME 100 MILLIGRAM(S): 1 INJECTION, POWDER, FOR SOLUTION INTRAMUSCULAR; INTRAVENOUS at 05:08

## 2018-11-08 RX ADMIN — SODIUM CHLORIDE 50 MILLILITER(S): 9 INJECTION INTRAMUSCULAR; INTRAVENOUS; SUBCUTANEOUS at 05:06

## 2018-11-08 RX ADMIN — HYDROMORPHONE HYDROCHLORIDE 0.5 MILLIGRAM(S): 2 INJECTION INTRAMUSCULAR; INTRAVENOUS; SUBCUTANEOUS at 18:11

## 2018-11-08 RX ADMIN — HYDROMORPHONE HYDROCHLORIDE 1.5 MILLIGRAM(S): 2 INJECTION INTRAMUSCULAR; INTRAVENOUS; SUBCUTANEOUS at 15:33

## 2018-11-08 RX ADMIN — HYDROMORPHONE HYDROCHLORIDE 0.5 MILLIGRAM(S): 2 INJECTION INTRAMUSCULAR; INTRAVENOUS; SUBCUTANEOUS at 17:56

## 2018-11-08 RX ADMIN — HYDROMORPHONE HYDROCHLORIDE 1.5 MILLIGRAM(S): 2 INJECTION INTRAMUSCULAR; INTRAVENOUS; SUBCUTANEOUS at 10:34

## 2018-11-08 RX ADMIN — Medication 1 APPLICATION(S): at 05:08

## 2018-11-08 RX ADMIN — MICAFUNGIN SODIUM 105 MILLIGRAM(S): 100 INJECTION, POWDER, LYOPHILIZED, FOR SOLUTION INTRAVENOUS at 12:17

## 2018-11-08 RX ADMIN — Medication 250 MILLIGRAM(S): at 10:21

## 2018-11-08 RX ADMIN — HYDROMORPHONE HYDROCHLORIDE 1.5 MILLIGRAM(S): 2 INJECTION INTRAMUSCULAR; INTRAVENOUS; SUBCUTANEOUS at 06:20

## 2018-11-08 RX ADMIN — HYDROMORPHONE HYDROCHLORIDE 1.5 MILLIGRAM(S): 2 INJECTION INTRAMUSCULAR; INTRAVENOUS; SUBCUTANEOUS at 20:00

## 2018-11-08 RX ADMIN — SODIUM CHLORIDE 50 MILLILITER(S): 9 INJECTION INTRAMUSCULAR; INTRAVENOUS; SUBCUTANEOUS at 22:24

## 2018-11-08 RX ADMIN — HYDROMORPHONE HYDROCHLORIDE 1.5 MILLIGRAM(S): 2 INJECTION INTRAMUSCULAR; INTRAVENOUS; SUBCUTANEOUS at 10:19

## 2018-11-08 RX ADMIN — Medication 1 TABLET(S): at 12:17

## 2018-11-08 RX ADMIN — LEVETIRACETAM 500 MILLIGRAM(S): 250 TABLET, FILM COATED ORAL at 05:08

## 2018-11-08 RX ADMIN — Medication 1 APPLICATION(S): at 17:58

## 2018-11-08 NOTE — PROGRESS NOTE ADULT - ASSESSMENT
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 FISH and PCR negative. s/p Masha-Auto on 12/16/16. 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   Neutropenia resolved, recovering counts.   Resolved fever.    CoNS bacteremia, likely due to PICC.   Repeat blood cx NTD.       Plan:   Continue with cefepime 2 gm iv q8h   Continue with Vancomycin 1 gm iv q12h  Trough 13.5, no changes in dose.   Follow up repeat blood cx, NTD   Will need 14 day therapy, day 6/14 today.   On micafungin for prophylaxis.

## 2018-11-08 NOTE — PROGRESS NOTE ADULT - ATTENDING COMMENTS
58F Ph(+) ALL s/p R HyperCVAD x 4 cycles IT MTX x2 s/p stem cell collection w/ Step 1(HD vp16 plus bernardino-c) regimen.  s/p Masha-Auto on 12/16/16. Pt was on Sprycel maintenance. 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 Has completed first cycle Inotuzumab.   -Headaches less, but still present. CT head(11/3)- SDH has nearly resolved.   -GI bleed has resolved, Continue po Protonix.  -Febrile on 11/2. CT c/a/p 10/16- Scattered and patchy ground glass opacities in the right upper and lower lobes, possibly infection.  Heterogeneous thickening of the endometrial cavity.  Antibiotics adjusted to Cefepime/Mycamine per ID service. BCx (11/2) from PICC growing coag negative staph, she was started on Vanco IV, ID followup. f/u repeat Cx's from PICC and peripherally NGTD  - goal plt ct >80K, but platelets are refractory to transfusion- receiving transfusion of 1/2 units over 3 hours twice a day. Keeping hb >7. Obtain HLA matched platelets as possible has already been requested.  - OOB, ambulation.  - Cycle 2 Inotuzumab started 11/6 58F Ph(+) ALL s/p R HyperCVAD x 4 cycles IT MTX x2 s/p stem cell collection w/ Step 1(HD vp16 plus bernardino-c) regimen.  s/p Masha-Auto on 12/16/16. Pt was on Sprycel maintenance. 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 Has completed first cycle Inotuzumab.   -Headaches less, but still present. CT head(11/3)- SDH has nearly resolved.   -GI bleed has resolved, Continue po Protonix.  -Febrile on 11/2. CT c/a/p 10/16- Scattered and patchy ground glass opacities in the right upper and lower lobes, possibly infection.  Heterogeneous thickening of the endometrial cavity.  Antibiotics adjusted to Cefepime/Mycamine per ID service. BCx (11/2) from PICC growing coag negative staph, she was started on Vanco IV, plan for 14 days, ID followup. f/u repeat Cx's from PICC and peripherally NGTD  - goal plt ct >80K, but platelets are refractory to transfusion- receiving transfusion of 1/2 units over 3 hours twice a day. Keeping hb >7. Obtain HLA matched platelets as possible has already been requested.  - OOB, ambulation.  - Cycle 2 Inotuzumab started 11/6

## 2018-11-08 NOTE — PROGRESS NOTE ADULT - PROBLEM SELECTOR PLAN 2
Patient is neutropenic, afebrile   Continue Mycamine for ppx (No azoles with Inotuzamab), Cefepime  Vancomycin added for BC gram + cocci in clusters on 11/2  complete 14 day course IV Vanco through 11/16

## 2018-11-08 NOTE — PROGRESS NOTE ADULT - SUBJECTIVE AND OBJECTIVE BOX
Diagnosis: Relapsed ALL Ph (+)     Protocol/Chemo Regimen: Cycle 2 Inotuzamab Days 1, 8 and 15    Day: 3    Pt endorsed: no complaints    Review of Systems: Denies nausea, vomiting, diarrhea, chest pain, SOB      Pain scale: 0    Diet: Regular    Allergies: No Known Drug Allergies  shellfish (Nausea; Vomiting)    ANTIMICROBIALS  cefepime   IVPB 2000 milliGRAM(s) IV Intermittent every 8 hours  micafungin IVPB 100 milliGRAM(s) IV Intermittent daily  vancomycin  IVPB 1000 milliGRAM(s) IV Intermittent every 12 hours      HEME/ONC MEDICATIONS  alteplase for catheter clearance 2 milliGRAM(s) Catheter once      STANDING MEDICATIONS  AQUAPHOR (petrolatum Ointment) 1 Application(s) Topical two times a day  influenza   Vaccine 0.5 milliLiter(s) IntraMuscular once  levETIRAcetam 500 milliGRAM(s) Oral two times a day  multivitamin 1 Tablet(s) Oral daily  pantoprazole    Tablet 40 milliGRAM(s) Oral before breakfast  sodium chloride 0.9% lock flush 20 milliLiter(s) IV Push once  sodium chloride 0.9%. 1000 milliLiter(s) IV Continuous <Continuous>      PRN MEDICATIONS  acetaminophen   Tablet .. 650 milliGRAM(s) Oral every 6 hours PRN  acetaminophen   Tablet .. 650 milliGRAM(s) Oral every 6 hours PRN  HYDROmorphone  Injectable 1.5 milliGRAM(s) IV Push every 4 hours PRN  HYDROmorphone  Injectable 0.5 milliGRAM(s) IV Push every 4 hours PRN  metoclopramide Injectable 10 milliGRAM(s) IV Push every 6 hours PRN  sodium chloride 0.9% lock flush 10 milliLiter(s) IV Push every 1 hour PRN  sodium chloride 0.9% lock flush 10 milliLiter(s) IV Push every 12 hours PRN      Vital Signs Last 24 Hrs  T(C): 36.7 (08 Nov 2018 05:10), Max: 37.2 (08 Nov 2018 00:44)  T(F): 98 (08 Nov 2018 05:10), Max: 99 (08 Nov 2018 00:44)  HR: 80 (08 Nov 2018 05:10) (74 - 87)  BP: 135/79 (08 Nov 2018 05:10) (129/71 - 148/85)  RR: 18 (08 Nov 2018 05:10) (18 - 18)  SpO2: 100% (08 Nov 2018 05:10) (99% - 100%)    PHYSICAL EXAM  General: adult in NAD  HEENT: clear oropharynx, anicteric sclera, pink conjunctiva  Neck: supple  CV: normal S1/S2 RRR  Lungs: positive air movement b/l ant lungs,clear to auscultation, no wheezes, no rales  Abdomen: soft non-tender non-distended,  Ext: no clubbing cyanosis or edema  Skin: no rashes and no petechiae  Neuro: alert and oriented X 4, no focal deficits  Central Line: RUE PICC c/d/i    LABS:    Cultures:     Culture - Blood in AM (11.05.18 @ 08:42)    Specimen Source: .Blood Blood-Peripheral    Culture Results:   No growth to date.      Culture - Blood in AM (11.05.18 @ 08:42)    Specimen Source: .Blood Blood-Catheter    Culture Results:   No growth to date.            RADIOLOGY & ADDITIONAL STUDIES:    from: CT Head No Cont (11.03.18 @ 15:56)     Impression: Stable ventricular size. The previously seen posterior   interhemispheric thin subdural hematoma has nearly resolved. Diagnosis: Relapsed ALL Ph (+)     Protocol/Chemo Regimen: Cycle 2 Inotuzamab Days 1, 8 and 15    Day: 3    Pt endorsed: no complaints    Review of Systems: Denies nausea, vomiting, diarrhea, chest pain, SOB      Pain scale: 0    Diet: Regular    Allergies: No Known Drug Allergies  shellfish (Nausea; Vomiting)    ANTIMICROBIALS  cefepime   IVPB 2000 milliGRAM(s) IV Intermittent every 8 hours  micafungin IVPB 100 milliGRAM(s) IV Intermittent daily  vancomycin  IVPB 1000 milliGRAM(s) IV Intermittent every 12 hours      HEME/ONC MEDICATIONS  alteplase for catheter clearance 2 milliGRAM(s) Catheter once      STANDING MEDICATIONS  AQUAPHOR (petrolatum Ointment) 1 Application(s) Topical two times a day  influenza   Vaccine 0.5 milliLiter(s) IntraMuscular once  levETIRAcetam 500 milliGRAM(s) Oral two times a day  multivitamin 1 Tablet(s) Oral daily  pantoprazole    Tablet 40 milliGRAM(s) Oral before breakfast  sodium chloride 0.9% lock flush 20 milliLiter(s) IV Push once  sodium chloride 0.9%. 1000 milliLiter(s) IV Continuous <Continuous>      PRN MEDICATIONS  acetaminophen   Tablet .. 650 milliGRAM(s) Oral every 6 hours PRN  acetaminophen   Tablet .. 650 milliGRAM(s) Oral every 6 hours PRN  HYDROmorphone  Injectable 1.5 milliGRAM(s) IV Push every 4 hours PRN  HYDROmorphone  Injectable 0.5 milliGRAM(s) IV Push every 4 hours PRN  metoclopramide Injectable 10 milliGRAM(s) IV Push every 6 hours PRN  sodium chloride 0.9% lock flush 10 milliLiter(s) IV Push every 1 hour PRN  sodium chloride 0.9% lock flush 10 milliLiter(s) IV Push every 12 hours PRN      Vital Signs Last 24 Hrs  T(C): 36.7 (08 Nov 2018 05:10), Max: 37.2 (08 Nov 2018 00:44)  T(F): 98 (08 Nov 2018 05:10), Max: 99 (08 Nov 2018 00:44)  HR: 80 (08 Nov 2018 05:10) (74 - 87)  BP: 135/79 (08 Nov 2018 05:10) (129/71 - 148/85)  RR: 18 (08 Nov 2018 05:10) (18 - 18)  SpO2: 100% (08 Nov 2018 05:10) (99% - 100%)    PHYSICAL EXAM  General: adult in NAD  HEENT: clear oropharynx, anicteric sclera, pink conjunctiva  Neck: supple  CV: normal S1/S2 RRR  Lungs: positive air movement b/l ant lungs,clear to auscultation, no wheezes, no rales  Abdomen: soft non-tender non-distended,  Ext: no clubbing cyanosis or edema  Skin: no rashes and no petechiae  Neuro: alert and oriented X 4, no focal deficits  Central Line: RUE PICC c/d/i    LABS:    Cultures:     Culture - Blood in AM (11.05.18 @ 08:42)    Specimen Source: .Blood Blood-Peripheral    Culture Results:   No growth to date.      Culture - Blood in AM (11.05.18 @ 08:42)    Specimen Source: .Blood Blood-Catheter    Culture Results:   No growth to date.                          7.9    3.9   )-----------( 53       ( 08 Nov 2018 07:00 )             22.7     08 Nov 2018 06:58    138    |  104    |  11     ----------------------------<  103    3.6     |  24     |  0.63     Ca    10.2       08 Nov 2018 06:58  Phos  3.7       08 Nov 2018 06:58  Mg     2.0       08 Nov 2018 06:58    TPro  6.1    /  Alb  3.6    /  TBili  0.5    /  DBili  x      /  AST  12     /  ALT  19     /  AlkPhos  117    08 Nov 2018 06:58    PT/INR - ( 08 Nov 2018 07:00 )   PT: 11.0 sec;   INR: 0.97 ratio         PTT - ( 08 Nov 2018 07:00 )  PTT:29.4 sec  CAPILLARY BLOOD GLUCOSE      LIVER FUNCTIONS - ( 08 Nov 2018 06:58 )  Alb: 3.6 g/dL / Pro: 6.1 g/dL / ALK PHOS: 117 U/L / ALT: 19 U/L / AST: 12 U/L / GGT: x                         RADIOLOGY & ADDITIONAL STUDIES:    from: CT Head No Cont (11.03.18 @ 15:56)     Impression: Stable ventricular size. The previously seen posterior   interhemispheric thin subdural hematoma has nearly resolved.

## 2018-11-08 NOTE — PROGRESS NOTE ADULT - PROBLEM SELECTOR PLAN 3
10/4 CT Head revealed small bilateral acute on chronic convexity subdural hematomas. Repeat CT Head on 10/5, 10/6 and 10/11 is stable with 10/16 results showing SDH decreasing in size. Repeat CT Head 10/23 with some small area of bleeding into previous collection.   Continue Keppra 500mg BID for seizure ppx   Pain management   Neurosurgery following, no intervention at this time.   Repeat Head CT for worsening headaches on 11/3 c/w nearly resolved subdural hematomas

## 2018-11-08 NOTE — PROGRESS NOTE ADULT - SUBJECTIVE AND OBJECTIVE BOX
58y old  Female who presents with a chief complaint of Headache/ r/o relapse ALL (08 Nov 2018 07:24)      Interval history:  Afebrile, no cough, no SOB, no N/V/D, no abdominal pain, denies dysuria. Headaches resolving now.     No Known Drug Allergies  shellfish (Nausea; Vomiting)    Antimicrobials:    cefepime   IVPB 2000 milliGRAM(s) IV Intermittent every 8 hours  micafungin IVPB 100 milliGRAM(s) IV Intermittent daily  vancomycin  IVPB 1000 milliGRAM(s) IV Intermittent every 12 hours    REVIEW OF SYSTEMS:  No chest pain   No rash.     Vital Signs Last 24 Hrs  T(C): 36.9 (11-08-18 @ 17:47), Max: 37.2 (11-08-18 @ 00:44)  T(F): 98.5 (11-08-18 @ 17:47), Max: 99 (11-08-18 @ 00:44)  HR: 82 (11-08-18 @ 17:47) (74 - 93)  BP: 146/83 (11-08-18 @ 18:31) (135/79 - 152/78)  RR: 18 (11-08-18 @ 17:47) (18 - 18)  SpO2: 100% (11-08-18 @ 17:47) (99% - 100%)      PHYSICAL EXAM:  Patient in no acute distress. AAOX3.  No icterus, no oral ulcers.  Cardiovascular: S1S2 normal.  Lungs: Good air entry B/L lung fields.  Gastrointestinal: soft, nontender, nondistended.  Extremities: + edema.  Rt arm PICC                            7.9    3.9   )-----------( 53       ( 08 Nov 2018 07:00 )             22.7   11-08    138  |  104  |  11  ----------------------------<  103<H>  3.6   |  24  |  0.63    Ca    10.2      08 Nov 2018 06:58  Phos  3.7     11-08  Mg     2.0     11-08    TPro  6.1  /  Alb  3.6  /  TBili  0.5  /  DBili  x   /  AST  12  /  ALT  19  /  AlkPhos  117  11-08      LIVER FUNCTIONS - ( 08 Nov 2018 06:58 )  Alb: 3.6 g/dL / Pro: 6.1 g/dL / ALK PHOS: 117 U/L / ALT: 19 U/L / AST: 12 U/L / GGT: x             Culture - Blood in AM (11.05.18 @ 08:42)    Specimen Source: .Blood Blood-Peripheral    Culture Results:   No growth to date.

## 2018-11-08 NOTE — PROGRESS NOTE ADULT - PROBLEM SELECTOR PLAN 1
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  now on cycle Cycle 2 started 11/6.  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

## 2018-11-09 LAB
ALBUMIN SERPL ELPH-MCNC: 4 G/DL — SIGNIFICANT CHANGE UP (ref 3.3–5)
ALP SERPL-CCNC: 125 U/L — HIGH (ref 40–120)
ALT FLD-CCNC: 19 U/L — SIGNIFICANT CHANGE UP (ref 10–45)
ANION GAP SERPL CALC-SCNC: 11 MMOL/L — SIGNIFICANT CHANGE UP (ref 5–17)
AST SERPL-CCNC: 15 U/L — SIGNIFICANT CHANGE UP (ref 10–40)
BASOPHILS # BLD AUTO: 0 K/UL — SIGNIFICANT CHANGE UP (ref 0–0.2)
BASOPHILS NFR BLD AUTO: 1 % — SIGNIFICANT CHANGE UP (ref 0–2)
BILIRUB SERPL-MCNC: 0.7 MG/DL — SIGNIFICANT CHANGE UP (ref 0.2–1.2)
BLD GP AB SCN SERPL QL: POSITIVE — SIGNIFICANT CHANGE UP
BUN SERPL-MCNC: 9 MG/DL — SIGNIFICANT CHANGE UP (ref 7–23)
CALCIUM SERPL-MCNC: 10.5 MG/DL — SIGNIFICANT CHANGE UP (ref 8.4–10.5)
CHLORIDE SERPL-SCNC: 103 MMOL/L — SIGNIFICANT CHANGE UP (ref 96–108)
CO2 SERPL-SCNC: 26 MMOL/L — SIGNIFICANT CHANGE UP (ref 22–31)
CREAT SERPL-MCNC: 0.63 MG/DL — SIGNIFICANT CHANGE UP (ref 0.5–1.3)
EOSINOPHIL # BLD AUTO: 0.1 K/UL — SIGNIFICANT CHANGE UP (ref 0–0.5)
GLUCOSE SERPL-MCNC: 115 MG/DL — HIGH (ref 70–99)
HCT VFR BLD CALC: 24.9 % — LOW (ref 34.5–45)
HGB BLD-MCNC: 8.6 G/DL — LOW (ref 11.5–15.5)
LDH SERPL L TO P-CCNC: 211 U/L — SIGNIFICANT CHANGE UP (ref 50–242)
LYMPHOCYTES # BLD AUTO: 1.4 K/UL — SIGNIFICANT CHANGE UP (ref 1–3.3)
LYMPHOCYTES # BLD AUTO: 24 % — SIGNIFICANT CHANGE UP (ref 13–44)
MAGNESIUM SERPL-MCNC: 2 MG/DL — SIGNIFICANT CHANGE UP (ref 1.6–2.6)
MCHC RBC-ENTMCNC: 31.6 PG — SIGNIFICANT CHANGE UP (ref 27–34)
MCHC RBC-ENTMCNC: 34.4 GM/DL — SIGNIFICANT CHANGE UP (ref 32–36)
MCV RBC AUTO: 91.8 FL — SIGNIFICANT CHANGE UP (ref 80–100)
MONOCYTES # BLD AUTO: 1.2 K/UL — HIGH (ref 0–0.9)
MONOCYTES NFR BLD AUTO: 27 % — HIGH (ref 2–14)
NEUTROPHILS # BLD AUTO: 1.8 K/UL — SIGNIFICANT CHANGE UP (ref 1.8–7.4)
NEUTROPHILS NFR BLD AUTO: 48 % — SIGNIFICANT CHANGE UP (ref 43–77)
PHOSPHATE SERPL-MCNC: 3.5 MG/DL — SIGNIFICANT CHANGE UP (ref 2.5–4.5)
PLATELET # BLD AUTO: 57 K/UL — LOW (ref 150–400)
POTASSIUM SERPL-MCNC: 3.6 MMOL/L — SIGNIFICANT CHANGE UP (ref 3.5–5.3)
POTASSIUM SERPL-SCNC: 3.6 MMOL/L — SIGNIFICANT CHANGE UP (ref 3.5–5.3)
PROT SERPL-MCNC: 6.6 G/DL — SIGNIFICANT CHANGE UP (ref 6–8.3)
RBC # BLD: 2.71 M/UL — LOW (ref 3.8–5.2)
RBC # FLD: 18.2 % — HIGH (ref 10.3–14.5)
RH IG SCN BLD-IMP: POSITIVE — SIGNIFICANT CHANGE UP
SODIUM SERPL-SCNC: 140 MMOL/L — SIGNIFICANT CHANGE UP (ref 135–145)
URATE SERPL-MCNC: 3.1 MG/DL — SIGNIFICANT CHANGE UP (ref 2.5–7)
WBC # BLD: 4.5 K/UL — SIGNIFICANT CHANGE UP (ref 3.8–10.5)
WBC # FLD AUTO: 4.5 K/UL — SIGNIFICANT CHANGE UP (ref 3.8–10.5)

## 2018-11-09 PROCEDURE — 93306 TTE W/DOPPLER COMPLETE: CPT | Mod: 26

## 2018-11-09 PROCEDURE — 99232 SBSQ HOSP IP/OBS MODERATE 35: CPT | Mod: GC

## 2018-11-09 PROCEDURE — 78472 GATED HEART PLANAR SINGLE: CPT | Mod: 26

## 2018-11-09 PROCEDURE — 70220 X-RAY EXAM OF SINUSES: CPT | Mod: 26

## 2018-11-09 RX ADMIN — SODIUM CHLORIDE 50 MILLILITER(S): 9 INJECTION INTRAMUSCULAR; INTRAVENOUS; SUBCUTANEOUS at 22:03

## 2018-11-09 RX ADMIN — SODIUM CHLORIDE 50 MILLILITER(S): 9 INJECTION INTRAMUSCULAR; INTRAVENOUS; SUBCUTANEOUS at 11:42

## 2018-11-09 RX ADMIN — HYDROMORPHONE HYDROCHLORIDE 1.5 MILLIGRAM(S): 2 INJECTION INTRAMUSCULAR; INTRAVENOUS; SUBCUTANEOUS at 06:38

## 2018-11-09 RX ADMIN — CEFEPIME 100 MILLIGRAM(S): 1 INJECTION, POWDER, FOR SOLUTION INTRAMUSCULAR; INTRAVENOUS at 06:36

## 2018-11-09 RX ADMIN — HYDROMORPHONE HYDROCHLORIDE 1.5 MILLIGRAM(S): 2 INJECTION INTRAMUSCULAR; INTRAVENOUS; SUBCUTANEOUS at 11:31

## 2018-11-09 RX ADMIN — Medication 250 MILLIGRAM(S): at 00:39

## 2018-11-09 RX ADMIN — HYDROMORPHONE HYDROCHLORIDE 1.5 MILLIGRAM(S): 2 INJECTION INTRAMUSCULAR; INTRAVENOUS; SUBCUTANEOUS at 15:38

## 2018-11-09 RX ADMIN — MICAFUNGIN SODIUM 105 MILLIGRAM(S): 100 INJECTION, POWDER, LYOPHILIZED, FOR SOLUTION INTRAVENOUS at 11:42

## 2018-11-09 RX ADMIN — HYDROMORPHONE HYDROCHLORIDE 1.5 MILLIGRAM(S): 2 INJECTION INTRAMUSCULAR; INTRAVENOUS; SUBCUTANEOUS at 15:55

## 2018-11-09 RX ADMIN — SODIUM CHLORIDE 50 MILLILITER(S): 9 INJECTION INTRAMUSCULAR; INTRAVENOUS; SUBCUTANEOUS at 06:36

## 2018-11-09 RX ADMIN — LEVETIRACETAM 500 MILLIGRAM(S): 250 TABLET, FILM COATED ORAL at 06:36

## 2018-11-09 RX ADMIN — HYDROMORPHONE HYDROCHLORIDE 1.5 MILLIGRAM(S): 2 INJECTION INTRAMUSCULAR; INTRAVENOUS; SUBCUTANEOUS at 00:43

## 2018-11-09 RX ADMIN — Medication 1 APPLICATION(S): at 06:36

## 2018-11-09 RX ADMIN — HYDROMORPHONE HYDROCHLORIDE 1.5 MILLIGRAM(S): 2 INJECTION INTRAMUSCULAR; INTRAVENOUS; SUBCUTANEOUS at 20:00

## 2018-11-09 RX ADMIN — CEFEPIME 100 MILLIGRAM(S): 1 INJECTION, POWDER, FOR SOLUTION INTRAMUSCULAR; INTRAVENOUS at 22:03

## 2018-11-09 RX ADMIN — Medication 1 TABLET(S): at 11:51

## 2018-11-09 RX ADMIN — CEFEPIME 100 MILLIGRAM(S): 1 INJECTION, POWDER, FOR SOLUTION INTRAMUSCULAR; INTRAVENOUS at 13:56

## 2018-11-09 RX ADMIN — HYDROMORPHONE HYDROCHLORIDE 1.5 MILLIGRAM(S): 2 INJECTION INTRAMUSCULAR; INTRAVENOUS; SUBCUTANEOUS at 11:16

## 2018-11-09 RX ADMIN — Medication 250 MILLIGRAM(S): at 11:42

## 2018-11-09 RX ADMIN — HYDROMORPHONE HYDROCHLORIDE 1.5 MILLIGRAM(S): 2 INJECTION INTRAMUSCULAR; INTRAVENOUS; SUBCUTANEOUS at 06:23

## 2018-11-09 RX ADMIN — HYDROMORPHONE HYDROCHLORIDE 1.5 MILLIGRAM(S): 2 INJECTION INTRAMUSCULAR; INTRAVENOUS; SUBCUTANEOUS at 00:58

## 2018-11-09 RX ADMIN — PANTOPRAZOLE SODIUM 40 MILLIGRAM(S): 20 TABLET, DELAYED RELEASE ORAL at 06:36

## 2018-11-09 RX ADMIN — HYDROMORPHONE HYDROCHLORIDE 1.5 MILLIGRAM(S): 2 INJECTION INTRAMUSCULAR; INTRAVENOUS; SUBCUTANEOUS at 20:15

## 2018-11-09 RX ADMIN — LEVETIRACETAM 500 MILLIGRAM(S): 250 TABLET, FILM COATED ORAL at 17:06

## 2018-11-09 RX ADMIN — Medication 1 APPLICATION(S): at 17:05

## 2018-11-09 NOTE — PROGRESS NOTE ADULT - ATTENDING COMMENTS
58F Ph(+) ALL s/p R HyperCVAD x 4 cycles IT MTX x2 s/p stem cell collection w/ Step 1(HD vp16 plus bernardino-c) regimen.  s/p Masha-Auto on 12/16/16. Pt was on Sprycel maintenance. 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 Has completed first cycle Inotuzumab.   -Headaches less, but still present. CT head(11/3)- SDH has nearly resolved.   -GI bleed has resolved, Continue po Protonix.  -Febrile on 11/2. CT c/a/p 10/16- Scattered and patchy ground glass opacities in the right upper and lower lobes, possibly infection.  Heterogeneous thickening of the endometrial cavity.  Antibiotics adjusted to Cefepime/Mycamine per ID service. BCx (11/2) from PICC growing coag negative staph, she was started on Vanco IV, plan for 14 days, ID followup. f/u repeat Cx's from PICC and peripherally NGTD  - goal plt ct >80K, but platelets are refractory to transfusion- receiving transfusion of 1/2 units over 3 hours twice a day. Keeping hb >7. Obtain HLA matched platelets as possible has already been requested.  - OOB, ambulation.  - Cycle 2 Inotuzumab started 11/6 58F Ph(+) ALL s/p R HyperCVAD x 4 cycles IT MTX x2 s/p stem cell collection w/ Step 1(HD vp16 plus bernardino-c) regimen.  s/p Masha-Auto on 12/16/16. Pt was on Sprycel maintenance. 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 Has completed first cycle Inotuzumab.   -Cycle 2 Inotuzumab started 11/6BCR/ABL mutation analysis pending.  -Headaches less, but still present. CT head(11/3)- SDH has nearly resolved.   -GI bleed has resolved, Continue po Protonix.  -Febrile on 11/2. CT c/a/p 10/16- Scattered and patchy ground glass opacities in the right upper and lower lobes, possibly infection.  Heterogeneous thickening of the endometrial cavity.  Antibiotics adjusted to Cefepime/Mycamine per ID service. BCx (11/2) from PICC growing coag negative staph, she was started on Vanco IV, plan for 14 days - finish 11/16, ID followup. f/u repeat Cx's from PICC and peripherally NGTD  - goal plt ct >80K, but platelets are refractory to transfusion- receiving transfusion of 1/2 units over 3 hours twice a day. Keeping hb >7. Obtain HLA matched platelets as possible has already been requested.  - OOB, ambulation.  - pretransplant testing

## 2018-11-09 NOTE — PROGRESS NOTE ADULT - PROBLEM SELECTOR PLAN 1
10/5 Peripheral flow confirms relapse with BCR/ABL rearrangement in 79.5% of cells  s/p Cycle 1 Inotuzamab (Day 1, 8 and 15).  now on cycle Cycle 2 started 11/6.  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

## 2018-11-09 NOTE — PROGRESS NOTE ADULT - SUBJECTIVE AND OBJECTIVE BOX
Diagnosis: Relapsed ALL Ph (+)     Protocol/Chemo Regimen: Cycle 2 Inotuzamab Days 1, 8 and 15    Day: 4    Pt endorsed: no complaints    Review of Systems: Denies nausea, vomiting, diarrhea, chest pain, SOB      Pain scale: 0    Diet: Regular  Allergies    No Known Drug Allergies  shellfish (Nausea; Vomiting)    Intolerances        ANTIMICROBIALS  cefepime   IVPB 2000 milliGRAM(s) IV Intermittent every 8 hours  micafungin IVPB 100 milliGRAM(s) IV Intermittent daily  vancomycin  IVPB 1000 milliGRAM(s) IV Intermittent every 12 hours      HEME/ONC MEDICATIONS  alteplase for catheter clearance 2 milliGRAM(s) Catheter once      STANDING MEDICATIONS  AQUAPHOR (petrolatum Ointment) 1 Application(s) Topical two times a day  influenza   Vaccine 0.5 milliLiter(s) IntraMuscular once  levETIRAcetam 500 milliGRAM(s) Oral two times a day  multivitamin 1 Tablet(s) Oral daily  pantoprazole    Tablet 40 milliGRAM(s) Oral before breakfast  sodium chloride 0.9% lock flush 20 milliLiter(s) IV Push once  sodium chloride 0.9%. 1000 milliLiter(s) IV Continuous <Continuous>      PRN MEDICATIONS  acetaminophen   Tablet .. 650 milliGRAM(s) Oral every 6 hours PRN  acetaminophen   Tablet .. 650 milliGRAM(s) Oral every 6 hours PRN  HYDROmorphone  Injectable 1.5 milliGRAM(s) IV Push every 4 hours PRN  HYDROmorphone  Injectable 0.5 milliGRAM(s) IV Push every 4 hours PRN  metoclopramide Injectable 10 milliGRAM(s) IV Push every 6 hours PRN  sodium chloride 0.9% lock flush 10 milliLiter(s) IV Push every 1 hour PRN  sodium chloride 0.9% lock flush 10 milliLiter(s) IV Push every 12 hours PRN        Vital Signs Last 24 Hrs  T(C): 37.1 (09 Nov 2018 06:10), Max: 37.2 (09 Nov 2018 01:02)  T(F): 98.7 (09 Nov 2018 06:10), Max: 99 (09 Nov 2018 01:02)  HR: 75 (09 Nov 2018 06:10) (75 - 93)  BP: 115/82 (09 Nov 2018 06:10) (115/82 - 149/92)  BP(mean): --  RR: 18 (09 Nov 2018 06:10) (17 - 18)  SpO2: 99% (09 Nov 2018 06:10) (98% - 100%)    PHYSICAL EXAM  General: NAD  HEENT:  clear oropharynx, anicteric sclera, Ommaya in place right side of head   CV: (+) S1/S2 RRR  Lungs: clear to auscultation, no wheezes or rales  Abdomen: soft, non-tender, non-distended (+) BS x 4Q  Ext: no edema  Skin: no rashes  Neuro: alert and oriented X 3  Central Line: PICC CDI           LABS:                        8.6    4.5   )-----------( 57       ( 09 Nov 2018 07:12 )             24.9         Mean Cell Volume : 91.8 fl  Mean Cell Hemoglobin : 31.6 pg  Mean Cell Hemoglobin Concentration : 34.4 gm/dL  Auto Neutrophil # : 1.8 K/uL  Auto Lymphocyte # : 1.4 K/uL  Auto Monocyte # : 1.2 K/uL  Auto Eosinophil # : 0.1 K/uL  Auto Basophil # : 0.0 K/uL  Auto Neutrophil % : 48.0 %  Auto Lymphocyte % : 24.0 %  Auto Monocyte % : 27.0 %  Auto Eosinophil % : x  Auto Basophil % : 1.0 %      11-09    140  |  103  |  9   ----------------------------<  115<H>  3.6   |  26  |  0.63    Ca    10.5      09 Nov 2018 07:12  Phos  3.5     11-09  Mg     2.0     11-09    TPro  6.6  /  Alb  4.0  /  TBili  0.7  /  DBili  x   /  AST  15  /  ALT  19  /  AlkPhos  125<H>  11-09      Mg 2.0  Phos 3.5      PT/INR - ( 08 Nov 2018 07:00 )   PT: 11.0 sec;   INR: 0.97 ratio         PTT - ( 08 Nov 2018 07:00 )  PTT:29.4 sec      Uric Acid 3.1        RECENT CULTURES:  11-05 @ 08:42  .Blood Blood-Catheter      No growth to date.  --  11-03 @ 08:39  .Blood Blood-Peripheral      No growth at 5 days.  --  11-03 @ 03:15  .Urine Clean Catch (Midstream)      No growth  --  11-02 @ 22:30  .Blood PICC/PERC Double Lumen BLUE  Blood Culture PCR  Blood Culture PCR  PCR    Growth in anaerobic bottle: Coag Negative Staphylococcus  "Susceptibilities not performed"  "Due to technical problems, Proteus sp. will Not be reported as part of  the BCID panel until further notice"  ***Blood Panel PCR results on this specimen are available  approximately 3 hours after the Gram stain result.***  Gram stain, PCR, and/or culture results may not always  correspond due to difference in methodologies.  ************************************************************  This PCR assay was performed using [employer].  The following targets are tested for: Enterococcus,  vancomycin resistant enterococci, Listeria monocytogenes,  coagulase negative staphylococci, S. aureus,  methicillin resistant S. aureus, Streptococcus agalactiae  (Group B), S. pneumoniae, S. pyogenes (Group A),  Acinetobacter baumannii, Enterobacter cloacae, E. coli,  Klebsiella oxytoca, K. pneumoniae, Proteus sp.,  Serratia marcescens, Haemophilus influenzae,  Neisseria meningitidis, Pseudomonas aeruginosa, Candida  albicans, C. glabrata, C krusei, C parapsilosis,  C. tropicalis and the KPC resistance gene.  --      RADIOLOGY & ADDITIONAL STUDIES:      CT Head No Cont (11.03.18 @ 15:56)     Impression: Stable ventricular size. The previously seen posterior   interhemispheric thin subdural hematoma has nearly resolved.

## 2018-11-09 NOTE — PROGRESS NOTE ADULT - ASSESSMENT
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 Patient is now receiving  Cycle 2  Inotuzumab on days 1, 8, 15. The patient's hospital course has been complicated by SDH, subconjunctival hemorrhage, neutropenic fevers, transaminitis and upper GIB. The patient has pancytopenia 2/2 disease and/or chemotherapy.

## 2018-11-10 LAB
ALBUMIN SERPL ELPH-MCNC: 3.9 G/DL — SIGNIFICANT CHANGE UP (ref 3.3–5)
ALP SERPL-CCNC: 126 U/L — HIGH (ref 40–120)
ALT FLD-CCNC: 19 U/L — SIGNIFICANT CHANGE UP (ref 10–45)
ANION GAP SERPL CALC-SCNC: 13 MMOL/L — SIGNIFICANT CHANGE UP (ref 5–17)
AST SERPL-CCNC: 16 U/L — SIGNIFICANT CHANGE UP (ref 10–40)
BASOPHILS # BLD AUTO: 0 K/UL — SIGNIFICANT CHANGE UP (ref 0–0.2)
BASOPHILS NFR BLD AUTO: 0.5 % — SIGNIFICANT CHANGE UP (ref 0–2)
BILIRUB SERPL-MCNC: 0.6 MG/DL — SIGNIFICANT CHANGE UP (ref 0.2–1.2)
BUN SERPL-MCNC: 10 MG/DL — SIGNIFICANT CHANGE UP (ref 7–23)
CALCIUM SERPL-MCNC: 10.2 MG/DL — SIGNIFICANT CHANGE UP (ref 8.4–10.5)
CHLORIDE SERPL-SCNC: 102 MMOL/L — SIGNIFICANT CHANGE UP (ref 96–108)
CO2 SERPL-SCNC: 25 MMOL/L — SIGNIFICANT CHANGE UP (ref 22–31)
COLLECT DURATION TIME UR: 24 HR — SIGNIFICANT CHANGE UP
CREAT SERPL-MCNC: 0.59 MG/DL — SIGNIFICANT CHANGE UP (ref 0.5–1.3)
CULTURE RESULTS: SIGNIFICANT CHANGE UP
CULTURE RESULTS: SIGNIFICANT CHANGE UP
EOSINOPHIL # BLD AUTO: 0.1 K/UL — SIGNIFICANT CHANGE UP (ref 0–0.5)
EOSINOPHIL NFR BLD AUTO: 2.3 % — SIGNIFICANT CHANGE UP (ref 0–6)
GLUCOSE BLDC GLUCOMTR-MCNC: 122 MG/DL — HIGH (ref 70–99)
GLUCOSE SERPL-MCNC: 93 MG/DL — SIGNIFICANT CHANGE UP (ref 70–99)
HCT VFR BLD CALC: 23.8 % — LOW (ref 34.5–45)
HGB BLD-MCNC: 8.3 G/DL — LOW (ref 11.5–15.5)
LDH SERPL L TO P-CCNC: 215 U/L — SIGNIFICANT CHANGE UP (ref 50–242)
LYMPHOCYTES # BLD AUTO: 1.6 K/UL — SIGNIFICANT CHANGE UP (ref 1–3.3)
LYMPHOCYTES # BLD AUTO: 29.2 % — SIGNIFICANT CHANGE UP (ref 13–44)
MAGNESIUM SERPL-MCNC: 1.9 MG/DL — SIGNIFICANT CHANGE UP (ref 1.6–2.6)
MCHC RBC-ENTMCNC: 32.1 PG — SIGNIFICANT CHANGE UP (ref 27–34)
MCHC RBC-ENTMCNC: 34.7 GM/DL — SIGNIFICANT CHANGE UP (ref 32–36)
MCV RBC AUTO: 92.7 FL — SIGNIFICANT CHANGE UP (ref 80–100)
MONOCYTES # BLD AUTO: 1.3 K/UL — HIGH (ref 0–0.9)
MONOCYTES NFR BLD AUTO: 24.8 % — HIGH (ref 2–14)
NEUTROPHILS # BLD AUTO: 2.3 K/UL — SIGNIFICANT CHANGE UP (ref 1.8–7.4)
NEUTROPHILS NFR BLD AUTO: 43.3 % — SIGNIFICANT CHANGE UP (ref 43–77)
PHOSPHATE SERPL-MCNC: 2.9 MG/DL — SIGNIFICANT CHANGE UP (ref 2.5–4.5)
PLATELET # BLD AUTO: 51 K/UL — LOW (ref 150–400)
POTASSIUM SERPL-MCNC: 3.4 MMOL/L — LOW (ref 3.5–5.3)
POTASSIUM SERPL-SCNC: 3.4 MMOL/L — LOW (ref 3.5–5.3)
PROT SERPL-MCNC: 6.8 G/DL — SIGNIFICANT CHANGE UP (ref 6–8.3)
RBC # BLD: 2.57 M/UL — LOW (ref 3.8–5.2)
RBC # FLD: 19 % — HIGH (ref 10.3–14.5)
SODIUM SERPL-SCNC: 140 MMOL/L — SIGNIFICANT CHANGE UP (ref 135–145)
SPECIMEN SOURCE: SIGNIFICANT CHANGE UP
SPECIMEN SOURCE: SIGNIFICANT CHANGE UP
TOTAL VOLUME - 24 HOUR: 2100 ML — SIGNIFICANT CHANGE UP
URATE SERPL-MCNC: 3 MG/DL — SIGNIFICANT CHANGE UP (ref 2.5–7)
URINE CREATININE CALCULATION: 1 G/24 H — SIGNIFICANT CHANGE UP (ref 0.8–1.8)
WBC # BLD: 5.3 K/UL — SIGNIFICANT CHANGE UP (ref 3.8–10.5)
WBC # FLD AUTO: 5.3 K/UL — SIGNIFICANT CHANGE UP (ref 3.8–10.5)

## 2018-11-10 PROCEDURE — 99232 SBSQ HOSP IP/OBS MODERATE 35: CPT

## 2018-11-10 RX ORDER — POTASSIUM CHLORIDE 20 MEQ
20 PACKET (EA) ORAL ONCE
Qty: 0 | Refills: 0 | Status: COMPLETED | OUTPATIENT
Start: 2018-11-10 | End: 2018-11-10

## 2018-11-10 RX ADMIN — Medication 1 APPLICATION(S): at 06:03

## 2018-11-10 RX ADMIN — HYDROMORPHONE HYDROCHLORIDE 1.5 MILLIGRAM(S): 2 INJECTION INTRAMUSCULAR; INTRAVENOUS; SUBCUTANEOUS at 00:22

## 2018-11-10 RX ADMIN — SODIUM CHLORIDE 50 MILLILITER(S): 9 INJECTION INTRAMUSCULAR; INTRAVENOUS; SUBCUTANEOUS at 21:37

## 2018-11-10 RX ADMIN — SODIUM CHLORIDE 50 MILLILITER(S): 9 INJECTION INTRAMUSCULAR; INTRAVENOUS; SUBCUTANEOUS at 17:36

## 2018-11-10 RX ADMIN — HYDROMORPHONE HYDROCHLORIDE 1.5 MILLIGRAM(S): 2 INJECTION INTRAMUSCULAR; INTRAVENOUS; SUBCUTANEOUS at 15:13

## 2018-11-10 RX ADMIN — Medication 250 MILLIGRAM(S): at 00:07

## 2018-11-10 RX ADMIN — PANTOPRAZOLE SODIUM 40 MILLIGRAM(S): 20 TABLET, DELAYED RELEASE ORAL at 06:02

## 2018-11-10 RX ADMIN — LEVETIRACETAM 500 MILLIGRAM(S): 250 TABLET, FILM COATED ORAL at 06:02

## 2018-11-10 RX ADMIN — HYDROMORPHONE HYDROCHLORIDE 1.5 MILLIGRAM(S): 2 INJECTION INTRAMUSCULAR; INTRAVENOUS; SUBCUTANEOUS at 10:12

## 2018-11-10 RX ADMIN — MICAFUNGIN SODIUM 105 MILLIGRAM(S): 100 INJECTION, POWDER, LYOPHILIZED, FOR SOLUTION INTRAVENOUS at 11:16

## 2018-11-10 RX ADMIN — CEFEPIME 100 MILLIGRAM(S): 1 INJECTION, POWDER, FOR SOLUTION INTRAMUSCULAR; INTRAVENOUS at 14:25

## 2018-11-10 RX ADMIN — HYDROMORPHONE HYDROCHLORIDE 1.5 MILLIGRAM(S): 2 INJECTION INTRAMUSCULAR; INTRAVENOUS; SUBCUTANEOUS at 19:36

## 2018-11-10 RX ADMIN — Medication 250 MILLIGRAM(S): at 23:21

## 2018-11-10 RX ADMIN — Medication 1 APPLICATION(S): at 17:34

## 2018-11-10 RX ADMIN — SODIUM CHLORIDE 50 MILLILITER(S): 9 INJECTION INTRAMUSCULAR; INTRAVENOUS; SUBCUTANEOUS at 09:59

## 2018-11-10 RX ADMIN — HYDROMORPHONE HYDROCHLORIDE 1.5 MILLIGRAM(S): 2 INJECTION INTRAMUSCULAR; INTRAVENOUS; SUBCUTANEOUS at 11:11

## 2018-11-10 RX ADMIN — HYDROMORPHONE HYDROCHLORIDE 1.5 MILLIGRAM(S): 2 INJECTION INTRAMUSCULAR; INTRAVENOUS; SUBCUTANEOUS at 05:55

## 2018-11-10 RX ADMIN — SODIUM CHLORIDE 50 MILLILITER(S): 9 INJECTION INTRAMUSCULAR; INTRAVENOUS; SUBCUTANEOUS at 14:56

## 2018-11-10 RX ADMIN — HYDROMORPHONE HYDROCHLORIDE 1.5 MILLIGRAM(S): 2 INJECTION INTRAMUSCULAR; INTRAVENOUS; SUBCUTANEOUS at 19:51

## 2018-11-10 RX ADMIN — Medication 20 MILLIEQUIVALENT(S): at 09:57

## 2018-11-10 RX ADMIN — CEFEPIME 100 MILLIGRAM(S): 1 INJECTION, POWDER, FOR SOLUTION INTRAMUSCULAR; INTRAVENOUS at 06:02

## 2018-11-10 RX ADMIN — HYDROMORPHONE HYDROCHLORIDE 1.5 MILLIGRAM(S): 2 INJECTION INTRAMUSCULAR; INTRAVENOUS; SUBCUTANEOUS at 14:54

## 2018-11-10 RX ADMIN — HYDROMORPHONE HYDROCHLORIDE 1.5 MILLIGRAM(S): 2 INJECTION INTRAMUSCULAR; INTRAVENOUS; SUBCUTANEOUS at 06:10

## 2018-11-10 RX ADMIN — LEVETIRACETAM 500 MILLIGRAM(S): 250 TABLET, FILM COATED ORAL at 17:35

## 2018-11-10 RX ADMIN — SODIUM CHLORIDE 50 MILLILITER(S): 9 INJECTION INTRAMUSCULAR; INTRAVENOUS; SUBCUTANEOUS at 06:03

## 2018-11-10 RX ADMIN — HYDROMORPHONE HYDROCHLORIDE 1.5 MILLIGRAM(S): 2 INJECTION INTRAMUSCULAR; INTRAVENOUS; SUBCUTANEOUS at 00:07

## 2018-11-10 RX ADMIN — Medication 250 MILLIGRAM(S): at 10:05

## 2018-11-10 RX ADMIN — Medication 1 TABLET(S): at 11:19

## 2018-11-10 RX ADMIN — CEFEPIME 100 MILLIGRAM(S): 1 INJECTION, POWDER, FOR SOLUTION INTRAMUSCULAR; INTRAVENOUS at 21:37

## 2018-11-10 NOTE — PROGRESS NOTE ADULT - PROBLEM SELECTOR PLAN 2
Patient is neutropenic, afebrile   Continue Mycamine for ppx (No azoles with Inotuzamab), Cefepime  Vancomycin added for BC gram + cocci in clusters on 11/2  complete 14 day course IV Vanco through 11/16 Patient is not neutropenic, afebrile   Continue Mycamine for ppx (No azoles with Inotuzamab), Cefepime  Vancomycin added for BC gram + cocci in clusters on 11/2  complete 14 day course IV Vanco through 11/16

## 2018-11-10 NOTE — PROGRESS NOTE ADULT - ASSESSMENT
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 Patient is now receiving  Cycle 2  Inotuzumab on days 1, 8, 15. The patient's hospital course has been complicated by SDH, subconjunctival hemorrhage, neutropenic fevers, transaminitis and upper GIB. The patient has pancytopenia 2/2 disease and/or chemotherapy.

## 2018-11-10 NOTE — PROGRESS NOTE ADULT - ATTENDING COMMENTS
58F Ph(+) ALL s/p R HyperCVAD x 4 cycles IT MTX x2 s/p stem cell collection w/ Step 1(HD vp16 plus bernardino-c) regimen.  s/p Masha-Auto on 12/16/16. Pt was on Sprycel maintenance. 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 Has completed first cycle Inotuzumab.   -Cycle 2 Inotuzumab started 11/6BCR/ABL mutation analysis pending.  -Headaches less, but still present. CT head(11/3)- SDH has nearly resolved.   -GI bleed has resolved, Continue po Protonix.  -Febrile on 11/2. CT c/a/p 10/16- Scattered and patchy ground glass opacities in the right upper and lower lobes, possibly infection.  Heterogeneous thickening of the endometrial cavity.  Antibiotics adjusted to Cefepime/Mycamine per ID service. BCx (11/2) from PICC growing coag negative staph, she was started on Vanco IV, plan for 14 days - finish 11/16, ID followup. f/u repeat Cx's from PICC and peripherally NGTD  - goal plt ct >80K, but platelets are refractory to transfusion- receiving transfusion of 1/2 units over 3 hours twice a day. Keeping hb >7. Obtain HLA matched platelets as possible has already been requested.  - OOB, ambulation.  - pretransplant testing 59 YO F Ph(+) ALL s/p R HyperCVAD x 4 cycles IT MTX x2 s/p stem cell collection w/ Step 1(HD vp16 plus bernardino-c) regimen.  s/p Masha-Auto on 12/16/16. Pt was on Sprycel maintenance. 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 Has completed first cycle Inotuzumab and cycle 2 Inotuzumab started 11/6. BCR/ABL mutation analysis pending.  - Feels better. Headaches less, but still present. CT head(11/3)- SDH has nearly resolved.   -GI bleed has resolved, Continue po Protonix.  -Now afebrile. Had been febrile on 11/2. CT c/a/p 10/16- Scattered and patchy ground glass opacities in the right upper and lower lobes, possibly infection.  Heterogeneous thickening of the endometrial cavity.  Antibiotics adjusted to Cefepime/Mycamine per ID service. BCx (11/2) from PICC growing coag negative staph, she was started on Vanco IV, plan for 14 days - finish 11/16, ID followup. f/u repeat Cx's from PICC and peripherally NGTD  - goal plt ct >80K, but platelets are refractory to transfusion- receiving transfusion of 1/2 units over 3 hours twice a day. Keeping hb >7. Obtain HLA matched platelets as possible.  - OOB, ambulation.  - pretransplant testing

## 2018-11-10 NOTE — PROGRESS NOTE ADULT - SUBJECTIVE AND OBJECTIVE BOX
Diagnosis: Relapsed ALL Ph (+)     Protocol/Chemo Regimen: Cycle 2 Inotuzamab Days 1, 8 and 15    Day: 5    Pt endorsed: no complaints    Review of Systems: Denies nausea, vomiting, diarrhea, chest pain, SOB      Pain scale: 0    Diet: Regular  Allergies    No Known Drug Allergies  shellfish (Nausea; Vomiting)    Intolerances        ANTIMICROBIALS  cefepime   IVPB 2000 milliGRAM(s) IV Intermittent every 8 hours  micafungin IVPB 100 milliGRAM(s) IV Intermittent daily  vancomycin  IVPB 1000 milliGRAM(s) IV Intermittent every 12 hours      HEME/ONC MEDICATIONS  alteplase for catheter clearance 2 milliGRAM(s) Catheter once      STANDING MEDICATIONS  AQUAPHOR (petrolatum Ointment) 1 Application(s) Topical two times a day  influenza   Vaccine 0.5 milliLiter(s) IntraMuscular once  levETIRAcetam 500 milliGRAM(s) Oral two times a day  multivitamin 1 Tablet(s) Oral daily  pantoprazole    Tablet 40 milliGRAM(s) Oral before breakfast  sodium chloride 0.9% lock flush 20 milliLiter(s) IV Push once  sodium chloride 0.9%. 1000 milliLiter(s) IV Continuous <Continuous>      PRN MEDICATIONS  acetaminophen   Tablet .. 650 milliGRAM(s) Oral every 6 hours PRN  acetaminophen   Tablet .. 650 milliGRAM(s) Oral every 6 hours PRN  HYDROmorphone  Injectable 1.5 milliGRAM(s) IV Push every 4 hours PRN  HYDROmorphone  Injectable 0.5 milliGRAM(s) IV Push every 4 hours PRN  metoclopramide Injectable 10 milliGRAM(s) IV Push every 6 hours PRN  sodium chloride 0.9% lock flush 10 milliLiter(s) IV Push every 1 hour PRN  sodium chloride 0.9% lock flush 10 milliLiter(s) IV Push every 12 hours PRN        Vital Signs Last 24 Hrs  T(C): 36.4 (10 Nov 2018 05:20), Max: 37 (09 Nov 2018 11:50)  T(F): 97.6 (10 Nov 2018 05:20), Max: 98.6 (09 Nov 2018 11:50)  HR: 78 (10 Nov 2018 05:20) (78 - 91)  BP: 146/85 (10 Nov 2018 05:20) (114/85 - 159/93)  BP(mean): --  RR: 18 (10 Nov 2018 05:20) (16 - 18)  SpO2: 99% (10 Nov 2018 05:20) (97% - 99%)    PHYSICAL EXAM  General: NAD  HEENT:  clear oropharynx, anicteric sclera, Ommaya in place right side of head   CV: (+) S1/S2 RRR  Lungs: clear to auscultation, no wheezes or rales  Abdomen: soft, non-tender, non-distended (+) BS x 4Q  Ext: no edema  Skin: no rashes  Neuro: alert and oriented X 3  Central Line: PICC CDI     LABS:                            8.3    5.3   )-----------( 51       ( 10 Nov 2018 06:48 )             23.8         Mean Cell Volume : 92.7 fl  Mean Cell Hemoglobin : 32.1 pg  Mean Cell Hemoglobin Concentration : 34.7 gm/dL  Auto Neutrophil # : x  Auto Lymphocyte # : x  Auto Monocyte # : x  Auto Eosinophil # : x  Auto Basophil # : x  Auto Neutrophil % : x  Auto Lymphocyte % : x  Auto Monocyte % : x  Auto Eosinophil % : x  Auto Basophil % : x      11-10    140  |  102  |  10  ----------------------------<  93  3.4<L>   |  25  |  0.59    Ca    10.2      10 Nov 2018 06:48  Phos  2.9     11-10  Mg     1.9     11-10    TPro  6.8  /  Alb  3.9  /  TBili  0.6  /  DBili  x   /  AST  16  /  ALT  19  /  AlkPhos  126<H>  11-10      Mg 1.9  Phos 2.9            Uric Acid 3.0                            RECENT CULTURES:  11-05 @ 08:42  .Blood Blood-Catheter      No growth to date.  --  11-03 @ 08:39  .Blood Blood-Peripheral      No growth at 5 days.  --  11-03 @ 03:15  .Urine Clean Catch (Midstream)      No growth  --  11-02 @ 22:30  .Blood PICC/PERC Double Lumen BLUE  Blood Culture PCR  Blood Culture PCR  PCR    Growth in anaerobic bottle: Coag Negative Staphylococcus  "Susceptibilities not performed"  "Due to technical problems, Proteus sp. will Not be reported as part of  the BCID panel until further notice"  ***Blood Panel PCR results on this specimen are available  approximately 3 hours after the Gram stain result.***  Gram stain, PCR, and/or culture results may not always  correspond due to difference in methodologies.  ************************************************************  This PCR assay was performed using [employer].  The following targets are tested for: Enterococcus,  vancomycin resistant enterococci, Listeria monocytogenes,  coagulase negative staphylococci, S. aureus,  methicillin resistant S. aureus, Streptococcus agalactiae  (Group B), S. pneumoniae, S. pyogenes (Group A),  Acinetobacter baumannii, Enterobacter cloacae, E. coli,  Klebsiella oxytoca, K. pneumoniae, Proteus sp.,  Serratia marcescens, Haemophilus influenzae,  Neisseria meningitidis, Pseudomonas aeruginosa, Candida  albicans, C. glabrata, C krusei, C parapsilosis,  C. tropicalis and the KPC resistance gene.  --      RADIOLOGY & ADDITIONAL STUDIES:      CT Head No Cont (11.03.18 @ 15:56)     Impression: Stable ventricular size. The previously seen posterior   interhemispheric thin subdural hematoma has nearly resolved.

## 2018-11-10 NOTE — PROGRESS NOTE ADULT - PROBLEM SELECTOR PLAN 1
10/5 Peripheral flow confirms relapse with BCR/ABL rearrangement in 79.5% of cells  s/p Cycle 1 Inotuzamab (Day 1, 8 and 15).  now on cycle Cycle 2 started 11/6.  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  now on cycle Cycle 2 started 11/6.  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

## 2018-11-11 LAB
ALBUMIN SERPL ELPH-MCNC: 4.1 G/DL — SIGNIFICANT CHANGE UP (ref 3.3–5)
ALP SERPL-CCNC: 130 U/L — HIGH (ref 40–120)
ALT FLD-CCNC: 19 U/L — SIGNIFICANT CHANGE UP (ref 10–45)
ANION GAP SERPL CALC-SCNC: 12 MMOL/L — SIGNIFICANT CHANGE UP (ref 5–17)
AST SERPL-CCNC: 15 U/L — SIGNIFICANT CHANGE UP (ref 10–40)
BASOPHILS # BLD AUTO: 0 K/UL — SIGNIFICANT CHANGE UP (ref 0–0.2)
BASOPHILS NFR BLD AUTO: 0.8 % — SIGNIFICANT CHANGE UP (ref 0–2)
BILIRUB SERPL-MCNC: 0.7 MG/DL — SIGNIFICANT CHANGE UP (ref 0.2–1.2)
BUN SERPL-MCNC: 10 MG/DL — SIGNIFICANT CHANGE UP (ref 7–23)
CALCIUM SERPL-MCNC: 10.5 MG/DL — SIGNIFICANT CHANGE UP (ref 8.4–10.5)
CHLORIDE SERPL-SCNC: 103 MMOL/L — SIGNIFICANT CHANGE UP (ref 96–108)
CO2 SERPL-SCNC: 24 MMOL/L — SIGNIFICANT CHANGE UP (ref 22–31)
CREAT SERPL-MCNC: 0.56 MG/DL — SIGNIFICANT CHANGE UP (ref 0.5–1.3)
EOSINOPHIL # BLD AUTO: 0.1 K/UL — SIGNIFICANT CHANGE UP (ref 0–0.5)
EOSINOPHIL NFR BLD AUTO: 2.2 % — SIGNIFICANT CHANGE UP (ref 0–6)
GLUCOSE SERPL-MCNC: 95 MG/DL — SIGNIFICANT CHANGE UP (ref 70–99)
HCT VFR BLD CALC: 24.6 % — LOW (ref 34.5–45)
HGB BLD-MCNC: 8.8 G/DL — LOW (ref 11.5–15.5)
LDH SERPL L TO P-CCNC: 231 U/L — SIGNIFICANT CHANGE UP (ref 50–242)
LYMPHOCYTES # BLD AUTO: 1.8 K/UL — SIGNIFICANT CHANGE UP (ref 1–3.3)
LYMPHOCYTES # BLD AUTO: 32.9 % — SIGNIFICANT CHANGE UP (ref 13–44)
MAGNESIUM SERPL-MCNC: 2.1 MG/DL — SIGNIFICANT CHANGE UP (ref 1.6–2.6)
MCHC RBC-ENTMCNC: 33.5 PG — SIGNIFICANT CHANGE UP (ref 27–34)
MCHC RBC-ENTMCNC: 35.8 GM/DL — SIGNIFICANT CHANGE UP (ref 32–36)
MCV RBC AUTO: 93.7 FL — SIGNIFICANT CHANGE UP (ref 80–100)
MONOCYTES # BLD AUTO: 1.3 K/UL — HIGH (ref 0–0.9)
MONOCYTES NFR BLD AUTO: 24 % — HIGH (ref 2–14)
NEUTROPHILS # BLD AUTO: 2.2 K/UL — SIGNIFICANT CHANGE UP (ref 1.8–7.4)
NEUTROPHILS NFR BLD AUTO: 40.1 % — LOW (ref 43–77)
PHOSPHATE SERPL-MCNC: 3.4 MG/DL — SIGNIFICANT CHANGE UP (ref 2.5–4.5)
PLATELET # BLD AUTO: 50 K/UL — LOW (ref 150–400)
POTASSIUM SERPL-MCNC: 4 MMOL/L — SIGNIFICANT CHANGE UP (ref 3.5–5.3)
POTASSIUM SERPL-SCNC: 4 MMOL/L — SIGNIFICANT CHANGE UP (ref 3.5–5.3)
PROT SERPL-MCNC: 7 G/DL — SIGNIFICANT CHANGE UP (ref 6–8.3)
RBC # BLD: 2.63 M/UL — LOW (ref 3.8–5.2)
RBC # FLD: 19.8 % — HIGH (ref 10.3–14.5)
SODIUM SERPL-SCNC: 139 MMOL/L — SIGNIFICANT CHANGE UP (ref 135–145)
URATE SERPL-MCNC: 2.9 MG/DL — SIGNIFICANT CHANGE UP (ref 2.5–7)
WBC # BLD: 5.4 K/UL — SIGNIFICANT CHANGE UP (ref 3.8–10.5)
WBC # FLD AUTO: 5.4 K/UL — SIGNIFICANT CHANGE UP (ref 3.8–10.5)

## 2018-11-11 PROCEDURE — 99232 SBSQ HOSP IP/OBS MODERATE 35: CPT

## 2018-11-11 RX ADMIN — CEFEPIME 100 MILLIGRAM(S): 1 INJECTION, POWDER, FOR SOLUTION INTRAMUSCULAR; INTRAVENOUS at 21:54

## 2018-11-11 RX ADMIN — Medication 1 APPLICATION(S): at 07:10

## 2018-11-11 RX ADMIN — MICAFUNGIN SODIUM 105 MILLIGRAM(S): 100 INJECTION, POWDER, LYOPHILIZED, FOR SOLUTION INTRAVENOUS at 11:30

## 2018-11-11 RX ADMIN — Medication 250 MILLIGRAM(S): at 10:16

## 2018-11-11 RX ADMIN — HYDROMORPHONE HYDROCHLORIDE 1.5 MILLIGRAM(S): 2 INJECTION INTRAMUSCULAR; INTRAVENOUS; SUBCUTANEOUS at 00:59

## 2018-11-11 RX ADMIN — Medication 1 TABLET(S): at 11:30

## 2018-11-11 RX ADMIN — HYDROMORPHONE HYDROCHLORIDE 1.5 MILLIGRAM(S): 2 INJECTION INTRAMUSCULAR; INTRAVENOUS; SUBCUTANEOUS at 07:17

## 2018-11-11 RX ADMIN — HYDROMORPHONE HYDROCHLORIDE 0.5 MILLIGRAM(S): 2 INJECTION INTRAMUSCULAR; INTRAVENOUS; SUBCUTANEOUS at 01:56

## 2018-11-11 RX ADMIN — LEVETIRACETAM 500 MILLIGRAM(S): 250 TABLET, FILM COATED ORAL at 07:10

## 2018-11-11 RX ADMIN — HYDROMORPHONE HYDROCHLORIDE 0.5 MILLIGRAM(S): 2 INJECTION INTRAMUSCULAR; INTRAVENOUS; SUBCUTANEOUS at 02:11

## 2018-11-11 RX ADMIN — HYDROMORPHONE HYDROCHLORIDE 1.5 MILLIGRAM(S): 2 INJECTION INTRAMUSCULAR; INTRAVENOUS; SUBCUTANEOUS at 16:30

## 2018-11-11 RX ADMIN — HYDROMORPHONE HYDROCHLORIDE 1.5 MILLIGRAM(S): 2 INJECTION INTRAMUSCULAR; INTRAVENOUS; SUBCUTANEOUS at 16:06

## 2018-11-11 RX ADMIN — HYDROMORPHONE HYDROCHLORIDE 1.5 MILLIGRAM(S): 2 INJECTION INTRAMUSCULAR; INTRAVENOUS; SUBCUTANEOUS at 20:15

## 2018-11-11 RX ADMIN — CEFEPIME 100 MILLIGRAM(S): 1 INJECTION, POWDER, FOR SOLUTION INTRAMUSCULAR; INTRAVENOUS at 13:24

## 2018-11-11 RX ADMIN — LEVETIRACETAM 500 MILLIGRAM(S): 250 TABLET, FILM COATED ORAL at 17:52

## 2018-11-11 RX ADMIN — HYDROMORPHONE HYDROCHLORIDE 1.5 MILLIGRAM(S): 2 INJECTION INTRAMUSCULAR; INTRAVENOUS; SUBCUTANEOUS at 07:02

## 2018-11-11 RX ADMIN — HYDROMORPHONE HYDROCHLORIDE 1.5 MILLIGRAM(S): 2 INJECTION INTRAMUSCULAR; INTRAVENOUS; SUBCUTANEOUS at 20:30

## 2018-11-11 RX ADMIN — SODIUM CHLORIDE 50 MILLILITER(S): 9 INJECTION INTRAMUSCULAR; INTRAVENOUS; SUBCUTANEOUS at 21:54

## 2018-11-11 RX ADMIN — PANTOPRAZOLE SODIUM 40 MILLIGRAM(S): 20 TABLET, DELAYED RELEASE ORAL at 07:10

## 2018-11-11 RX ADMIN — CEFEPIME 100 MILLIGRAM(S): 1 INJECTION, POWDER, FOR SOLUTION INTRAMUSCULAR; INTRAVENOUS at 07:10

## 2018-11-11 RX ADMIN — HYDROMORPHONE HYDROCHLORIDE 1.5 MILLIGRAM(S): 2 INJECTION INTRAMUSCULAR; INTRAVENOUS; SUBCUTANEOUS at 11:24

## 2018-11-11 RX ADMIN — SODIUM CHLORIDE 50 MILLILITER(S): 9 INJECTION INTRAMUSCULAR; INTRAVENOUS; SUBCUTANEOUS at 17:52

## 2018-11-11 RX ADMIN — Medication 250 MILLIGRAM(S): at 23:37

## 2018-11-11 RX ADMIN — HYDROMORPHONE HYDROCHLORIDE 1.5 MILLIGRAM(S): 2 INJECTION INTRAMUSCULAR; INTRAVENOUS; SUBCUTANEOUS at 00:44

## 2018-11-11 RX ADMIN — Medication 1 APPLICATION(S): at 17:52

## 2018-11-11 RX ADMIN — HYDROMORPHONE HYDROCHLORIDE 1.5 MILLIGRAM(S): 2 INJECTION INTRAMUSCULAR; INTRAVENOUS; SUBCUTANEOUS at 12:00

## 2018-11-11 RX ADMIN — SODIUM CHLORIDE 50 MILLILITER(S): 9 INJECTION INTRAMUSCULAR; INTRAVENOUS; SUBCUTANEOUS at 07:10

## 2018-11-11 NOTE — PROGRESS NOTE ADULT - PROBLEM SELECTOR PLAN 2
Patient is not neutropenic, afebrile   Continue Mycamine for ppx (No azoles with Inotuzamab), Cefepime  Vancomycin added for BC gram + cocci in clusters on 11/2  complete 14 day course IV Vanco through 11/16

## 2018-11-11 NOTE — PROGRESS NOTE ADULT - PROBLEM SELECTOR PLAN 1
10/5 Peripheral flow confirms relapse with BCR/ABL rearrangement in 79.5% of cells  s/p Cycle 1 Inotuzamab (Day 1, 8 and 15).  now on cycle Cycle 2 started 11/6.  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

## 2018-11-11 NOTE — PROGRESS NOTE ADULT - ASSESSMENT
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 Patient is now receiving  Cycle 2  Inotuzumab on days 1, 8, 15. The patient's hospital course has been complicated by SDH, subconjunctival hemorrhage, neutropenic fevers, transaminitis and upper GIB. The patient has pancytopenia 2/2 disease and/or chemotherapy. 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 Patient is now receiving  Cycle 2  Inotuzumab on days 1, 8, 15. The patient's hospital course has been complicated by SDH, Gram positive bacteremia, subconjunctival hemorrhage, neutropenic fevers, transaminitis and upper GIB. The patient has thrombocytopenia and anemia  2/2 disease and/or chemotherapy.

## 2018-11-11 NOTE — PROGRESS NOTE ADULT - ATTENDING COMMENTS
57 YO F Ph(+) ALL s/p R HyperCVAD x 4 cycles IT MTX x2 s/p stem cell collection w/ Step 1(HD vp16 plus bernardino-c) regimen.  s/p Masha-Auto on 12/16/16. Pt was on Sprycel maintenance. 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 Has completed first cycle Inotuzumab and cycle 2 Inotuzumab started 11/6. BCR/ABL mutation analysis pending.  - Feels better. Headaches less, but still present. CT head(11/3)- SDH has nearly resolved.   -GI bleed has resolved, Continue po Protonix.  -Now afebrile. Had been febrile on 11/2. CT c/a/p 10/16- Scattered and patchy ground glass opacities in the right upper and lower lobes, possibly infection.  Heterogeneous thickening of the endometrial cavity.  Antibiotics adjusted to Cefepime/Mycamine per ID service. BCx (11/2) from PICC growing coag negative staph, she was started on Vanco IV, plan for 14 days - finish 11/16, ID followup. f/u repeat Cx's from PICC and peripherally NGTD  - goal plt ct >80K, but platelets are refractory to transfusion- receiving transfusion of 1/2 units over 3 hours twice a day. Keeping hb >7. Obtain HLA matched platelets as possible.  - OOB, ambulation.  - pretransplant testing 59 YO F Ph(+) ALL s/p R HyperCVAD x 4 cycles IT MTX x2 s/p stem cell collection w/ Step 1(HD vp16 plus bernardino-c) regimen.  s/p Masha-Auto on 12/16/16. Pt was on Sprycel maintenance. 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 Has completed first cycle Inotuzumab and cycle 2 Inotuzumab started 11/6. BCR/ABL mutation analysis pending.  - Feels better. Headaches less, but still present. CT head(11/3)- SDH has nearly resolved. She reports improvement today.  -GI bleed has resolved, Continue po Protonix.  -Now afebrile. Had been febrile on 11/2. CT c/a/p 10/16- Scattered and patchy ground glass opacities in the right upper and lower lobes, possibly infection.  Heterogeneous thickening of the endometrial cavity.  Antibiotics adjusted to Cefepime/Mycamine per ID service. BCx (11/2) from PICC growing coag negative staph, she was started on Vanco IV, plan for 14 days - finish 11/16, ID followup. f/u repeat Cx's from PICC and peripherally NGTD  - goal plt ct >80K, but platelets are refractory to transfusion- receiving transfusion of 1/2 units over 3 hours twice a day. Keeping hb >7. Obtain HLA matched platelets as possible.  - OOB, ambulation.  - pretransplant testing

## 2018-11-11 NOTE — PROGRESS NOTE ADULT - SUBJECTIVE AND OBJECTIVE BOX
Diagnosis: Relapsed ALL Ph (+)     Protocol/Chemo Regimen: Cycle 2 Inotuzamab Days 1, 8 and 15    Day: 6    Pt endorsed: no complaints    Review of Systems: Denies nausea, vomiting, diarrhea, chest pain, SOB      Pain scale: 0    Diet: Regular  Allergies    No Known Drug Allergies  shellfish (Nausea; Vomiting)    Intolerances        ANTIMICROBIALS  cefepime   IVPB 2000 milliGRAM(s) IV Intermittent every 8 hours  micafungin IVPB 100 milliGRAM(s) IV Intermittent daily  vancomycin  IVPB 1000 milliGRAM(s) IV Intermittent every 12 hours      HEME/ONC MEDICATIONS  alteplase for catheter clearance 2 milliGRAM(s) Catheter once      STANDING MEDICATIONS  AQUAPHOR (petrolatum Ointment) 1 Application(s) Topical two times a day  influenza   Vaccine 0.5 milliLiter(s) IntraMuscular once  levETIRAcetam 500 milliGRAM(s) Oral two times a day  multivitamin 1 Tablet(s) Oral daily  pantoprazole    Tablet 40 milliGRAM(s) Oral before breakfast  sodium chloride 0.9% lock flush 20 milliLiter(s) IV Push once  sodium chloride 0.9%. 1000 milliLiter(s) IV Continuous <Continuous>      PRN MEDICATIONS  acetaminophen   Tablet .. 650 milliGRAM(s) Oral every 6 hours PRN  acetaminophen   Tablet .. 650 milliGRAM(s) Oral every 6 hours PRN  HYDROmorphone  Injectable 1.5 milliGRAM(s) IV Push every 4 hours PRN  HYDROmorphone  Injectable 0.5 milliGRAM(s) IV Push every 4 hours PRN  metoclopramide Injectable 10 milliGRAM(s) IV Push every 6 hours PRN  sodium chloride 0.9% lock flush 10 milliLiter(s) IV Push every 1 hour PRN  sodium chloride 0.9% lock flush 10 milliLiter(s) IV Push every 12 hours PRN        Vital Signs Last 24 Hrs  T(C): 37 (11 Nov 2018 05:19), Max: 37.3 (10 Nov 2018 17:28)  T(F): 98.6 (11 Nov 2018 05:19), Max: 99.2 (10 Nov 2018 17:28)  HR: 83 (11 Nov 2018 05:19) (75 - 92)  BP: 141/81 (11 Nov 2018 05:19) (127/83 - 151/89)  BP(mean): --  RR: 18 (11 Nov 2018 05:19) (18 - 18)  SpO2: 100% (11 Nov 2018 05:19) (96% - 100%)    PHYSICAL EXAM  General: NAD  HEENT:  clear oropharynx, anicteric sclera, Ommaya in place right side of head   CV: (+) S1/S2 RRR  Lungs: clear to auscultation, no wheezes or rales  Abdomen: soft, non-tender, non-distended (+) BS x 4Q  Ext: no edema  Skin: no rashes  Neuro: alert and oriented X 3  Central Line: PICC CDI                                 RECENT CULTURES:  11-05 @ 08:42  .Blood Blood-Catheter      No growth to date.  --  11-03 @ 08:39  .Blood Blood-Peripheral      No growth at 5 days.  --  11-03 @ 03:15  .Urine Clean Catch (Midstream)      No growth  --  11-02 @ 22:30  .Blood PICC/PERC Double Lumen BLUE  Blood Culture PCR  Blood Culture PCR  PCR    Growth in anaerobic bottle: Coag Negative Staphylococcus  "Susceptibilities not performed"  "Due to technical problems, Proteus sp. will Not be reported as part of  the BCID panel until further notice"  ***Blood Panel PCR results on this specimen are available  approximately 3 hours after the Gram stain result.***  Gram stain, PCR, and/or culture results may not always  correspond due to difference in methodologies.  ************************************************************  This PCR assay was performed using [employer].  The following targets are tested for: Enterococcus,  vancomycin resistant enterococci, Listeria monocytogenes,  coagulase negative staphylococci, S. aureus,  methicillin resistant S. aureus, Streptococcus agalactiae  (Group B), S. pneumoniae, S. pyogenes (Group A),  Acinetobacter baumannii, Enterobacter cloacae, E. coli,  Klebsiella oxytoca, K. pneumoniae, Proteus sp.,  Serratia marcescens, Haemophilus influenzae,  Neisseria meningitidis, Pseudomonas aeruginosa, Candida  albicans, C. glabrata, C krusei, C parapsilosis,  C. tropicalis and the KPC resistance gene.  --      RADIOLOGY & ADDITIONAL STUDIES:      CT Head No Cont (11.03.18 @ 15:56)     Impression: Stable ventricular size. The previously seen posterior   interhemispheric thin subdural hematoma has nearly resolved. Diagnosis: Relapsed ALL Ph (+)     Protocol/Chemo Regimen: Cycle 2 In[place] Days 1, 8 and 15    Day: 6    Pt endorsed: no complaints    Review of Systems: Denies nausea, vomiting, diarrhea, chest pain, SOB      Pain scale: 0    Diet: Regular  Allergies    No Known Drug Allergies  shellfish (Nausea; Vomiting)    Intolerances        ANTIMICROBIALS  cefepime   IVPB 2000 milliGRAM(s) IV Intermittent every 8 hours  micafungin IVPB 100 milliGRAM(s) IV Intermittent daily  vancomycin  IVPB 1000 milliGRAM(s) IV Intermittent every 12 hours      HEME/ONC MEDICATIONS  alteplase for catheter clearance 2 milliGRAM(s) Catheter once      STANDING MEDICATIONS  AQUAPHOR (petrolatum Ointment) 1 Application(s) Topical two times a day  influenza   Vaccine 0.5 milliLiter(s) IntraMuscular once  levETIRAcetam 500 milliGRAM(s) Oral two times a day  multivitamin 1 Tablet(s) Oral daily  pantoprazole    Tablet 40 milliGRAM(s) Oral before breakfast  sodium chloride 0.9% lock flush 20 milliLiter(s) IV Push once  sodium chloride 0.9%. 1000 milliLiter(s) IV Continuous <Continuous>      PRN MEDICATIONS  acetaminophen   Tablet .. 650 milliGRAM(s) Oral every 6 hours PRN  acetaminophen   Tablet .. 650 milliGRAM(s) Oral every 6 hours PRN  HYDROmorphone  Injectable 1.5 milliGRAM(s) IV Push every 4 hours PRN  HYDROmorphone  Injectable 0.5 milliGRAM(s) IV Push every 4 hours PRN  metoclopramide Injectable 10 milliGRAM(s) IV Push every 6 hours PRN  sodium chloride 0.9% lock flush 10 milliLiter(s) IV Push every 1 hour PRN  sodium chloride 0.9% lock flush 10 milliLiter(s) IV Push every 12 hours PRN        Vital Signs Last 24 Hrs  T(C): 37 (11 Nov 2018 05:19), Max: 37.3 (10 Nov 2018 17:28)  T(F): 98.6 (11 Nov 2018 05:19), Max: 99.2 (10 Nov 2018 17:28)  HR: 83 (11 Nov 2018 05:19) (75 - 92)  BP: 141/81 (11 Nov 2018 05:19) (127/83 - 151/89)  BP(mean): --  RR: 18 (11 Nov 2018 05:19) (18 - 18)  SpO2: 100% (11 Nov 2018 05:19) (96% - 100%)    PHYSICAL EXAM  General: NAD  HEENT:  clear oropharynx, anicteric sclera, Ommaya in place right side of head   CV: (+) S1/S2 RRR  Lungs: clear to auscultation, no wheezes or rales  Abdomen: soft, non-tender, non-distended (+) BS x 4Q  Ext: no edema  Skin: no rashes  Neuro: alert and oriented X 3  Central Line: PICC CDI     LABS:                              8.8    5.4   )-----------( 50       ( 11 Nov 2018 07:28 )             24.6         Mean Cell Volume : 93.7 fl  Mean Cell Hemoglobin : 33.5 pg  Mean Cell Hemoglobin Concentration : 35.8 gm/dL  Auto Neutrophil # : x  Auto Lymphocyte # : x  Auto Monocyte # : x  Auto Eosinophil # : x  Auto Basophil # : x  Auto Neutrophil % : x  Auto Lymphocyte % : x  Auto Monocyte % : x  Auto Eosinophil % : x  Auto Basophil % : x      11-11    139  |  103  |  10  ----------------------------<  95  4.0   |  24  |  0.56    Ca    10.5      11 Nov 2018 07:28  Phos  3.4     11-11  Mg     2.1     11-11    TPro  7.0  /  Alb  4.1  /  TBili  0.7  /  DBili  x   /  AST  15  /  ALT  19  /  AlkPhos  130<H>  11-11      Mg 2.1  Phos 3.4            Uric Acid 2.9                  RECENT CULTURES:  11-05 @ 08:42  .Blood Blood-Catheter      No growth to date.  --  11-03 @ 08:39  .Blood Blood-Peripheral      No growth at 5 days.  --  11-03 @ 03:15  .Urine Clean Catch (Midstream)      No growth  --  11-02 @ 22:30  .Blood PICC/PERC Double Lumen BLUE  Blood Culture PCR  Blood Culture PCR  PCR    Growth in anaerobic bottle: Coag Negative Staphylococcus  "Susceptibilities not performed"  "Due to technical problems, Proteus sp. will Not be reported as part of  the BCID panel until further notice"  ***Blood Panel PCR results on this specimen are available  approximately 3 hours after the Gram stain result.***  Gram stain, PCR, and/or culture results may not always  correspond due to difference in methodologies.  ************************************************************  This PCR assay was performed using [employer].  The following targets are tested for: Enterococcus,  vancomycin resistant enterococci, Listeria monocytogenes,  coagulase negative staphylococci, S. aureus,  methicillin resistant S. aureus, Streptococcus agalactiae  (Group B), S. pneumoniae, S. pyogenes (Group A),  Acinetobacter baumannii, Enterobacter cloacae, E. coli,  Klebsiella oxytoca, K. pneumoniae, Proteus sp.,  Serratia marcescens, Haemophilus influenzae,  Neisseria meningitidis, Pseudomonas aeruginosa, Candida  albicans, C. glabrata, C krusei, C parapsilosis,  C. tropicalis and the KPC resistance gene.  --      RADIOLOGY & ADDITIONAL STUDIES:      CT Head No Cont (11.03.18 @ 15:56)     Impression: Stable ventricular size. The previously seen posterior   interhemispheric thin subdural hematoma has nearly resolved. Diagnosis: Relapsed ALL Ph (+)     Protocol/Chemo Regimen: Cycle 2 In[place] Days 1, 8 and 15    Day: 6    Pt endorsed: intermittent headache controlled with pain medications     Review of Systems: Denies nausea, vomiting, diarrhea, chest pain, SOB      Pain scale: denies    Diet: Regular  Allergies    No Known Drug Allergies  shellfish (Nausea; Vomiting)    Intolerances        ANTIMICROBIALS  cefepime   IVPB 2000 milliGRAM(s) IV Intermittent every 8 hours  micafungin IVPB 100 milliGRAM(s) IV Intermittent daily  vancomycin  IVPB 1000 milliGRAM(s) IV Intermittent every 12 hours      HEME/ONC MEDICATIONS  alteplase for catheter clearance 2 milliGRAM(s) Catheter once      STANDING MEDICATIONS  AQUAPHOR (petrolatum Ointment) 1 Application(s) Topical two times a day  influenza   Vaccine 0.5 milliLiter(s) IntraMuscular once  levETIRAcetam 500 milliGRAM(s) Oral two times a day  multivitamin 1 Tablet(s) Oral daily  pantoprazole    Tablet 40 milliGRAM(s) Oral before breakfast  sodium chloride 0.9% lock flush 20 milliLiter(s) IV Push once  sodium chloride 0.9%. 1000 milliLiter(s) IV Continuous <Continuous>      PRN MEDICATIONS  acetaminophen   Tablet .. 650 milliGRAM(s) Oral every 6 hours PRN  acetaminophen   Tablet .. 650 milliGRAM(s) Oral every 6 hours PRN  HYDROmorphone  Injectable 1.5 milliGRAM(s) IV Push every 4 hours PRN  HYDROmorphone  Injectable 0.5 milliGRAM(s) IV Push every 4 hours PRN  metoclopramide Injectable 10 milliGRAM(s) IV Push every 6 hours PRN  sodium chloride 0.9% lock flush 10 milliLiter(s) IV Push every 1 hour PRN  sodium chloride 0.9% lock flush 10 milliLiter(s) IV Push every 12 hours PRN        Vital Signs Last 24 Hrs  T(C): 37 (11 Nov 2018 05:19), Max: 37.3 (10 Nov 2018 17:28)  T(F): 98.6 (11 Nov 2018 05:19), Max: 99.2 (10 Nov 2018 17:28)  HR: 83 (11 Nov 2018 05:19) (75 - 92)  BP: 141/81 (11 Nov 2018 05:19) (127/83 - 151/89)  BP(mean): --  RR: 18 (11 Nov 2018 05:19) (18 - 18)  SpO2: 100% (11 Nov 2018 05:19) (96% - 100%)    PHYSICAL EXAM  General: NAD  HEENT:  clear oropharynx, anicteric sclera, Ommaya in place right side of head   CV: (+) S1/S2 RRR  Lungs: clear to auscultation, no wheezes or rales  Abdomen: soft, non-tender, non-distended (+) BS x 4Q  Ext: no edema  Skin: no rashes  Neuro: alert and oriented X 3  Central Line: PICC CDI     LABS:                              8.8    5.4   )-----------( 50       ( 11 Nov 2018 07:28 )             24.6         Mean Cell Volume : 93.7 fl  Mean Cell Hemoglobin : 33.5 pg  Mean Cell Hemoglobin Concentration : 35.8 gm/dL  Auto Neutrophil # : x  Auto Lymphocyte # : x  Auto Monocyte # : x  Auto Eosinophil # : x  Auto Basophil # : x  Auto Neutrophil % : x  Auto Lymphocyte % : x  Auto Monocyte % : x  Auto Eosinophil % : x  Auto Basophil % : x      11-11    139  |  103  |  10  ----------------------------<  95  4.0   |  24  |  0.56    Ca    10.5      11 Nov 2018 07:28  Phos  3.4     11-11  Mg     2.1     11-11    TPro  7.0  /  Alb  4.1  /  TBili  0.7  /  DBili  x   /  AST  15  /  ALT  19  /  AlkPhos  130<H>  11-11      Mg 2.1  Phos 3.4            Uric Acid 2.9                  RECENT CULTURES:  11-05 @ 08:42  .Blood Blood-Catheter      No growth to date.  --  11-03 @ 08:39  .Blood Blood-Peripheral      No growth at 5 days.  --  11-03 @ 03:15  .Urine Clean Catch (Midstream)      No growth  --  11-02 @ 22:30  .Blood PICC/PERC Double Lumen BLUE  Blood Culture PCR  Blood Culture PCR  PCR    Growth in anaerobic bottle: Coag Negative Staphylococcus  "Susceptibilities not performed"  "Due to technical problems, Proteus sp. will Not be reported as part of  the BCID panel until further notice"  ***Blood Panel PCR results on this specimen are available  approximately 3 hours after the Gram stain result.***  Gram stain, PCR, and/or culture results may not always  correspond due to difference in methodologies.  ************************************************************  This PCR assay was performed using [employer].  The following targets are tested for: Enterococcus,  vancomycin resistant enterococci, Listeria monocytogenes,  coagulase negative staphylococci, S. aureus,  methicillin resistant S. aureus, Streptococcus agalactiae  (Group B), S. pneumoniae, S. pyogenes (Group A),  Acinetobacter baumannii, Enterobacter cloacae, E. coli,  Klebsiella oxytoca, K. pneumoniae, Proteus sp.,  Serratia marcescens, Haemophilus influenzae,  Neisseria meningitidis, Pseudomonas aeruginosa, Candida  albicans, C. glabrata, C krusei, C parapsilosis,  C. tropicalis and the KPC resistance gene.  --      RADIOLOGY & ADDITIONAL STUDIES:      CT Head No Cont (11.03.18 @ 15:56)     Impression: Stable ventricular size. The previously seen posterior   interhemispheric thin subdural hematoma has nearly resolved. Diagnosis: Relapsed ALL Ph (+)     Protocol/Chemo Regimen: Cycle 2 Inot[place]amab Days 1, 8 and 15    Day: 6    Pt endorsed: intermittent headache controlled with pain medications     Review of Systems: Denies nausea, vomiting, diarrhea, chest pain, SOB      Pain scale: denies    Diet: Regular  Allergies    No Known Drug Allergies  shellfish (Nausea; Vomiting)    Intolerances      ANTIMICROBIALS  cefepime   IVPB 2000 milliGRAM(s) IV Intermittent every 8 hours  micafungin IVPB 100 milliGRAM(s) IV Intermittent daily  vancomycin  IVPB 1000 milliGRAM(s) IV Intermittent every 12 hours      HEME/ONC MEDICATIONS  alteplase for catheter clearance 2 milliGRAM(s) Catheter once      STANDING MEDICATIONS  AQUAPHOR (petrolatum Ointment) 1 Application(s) Topical two times a day  influenza   Vaccine 0.5 milliLiter(s) IntraMuscular once  levETIRAcetam 500 milliGRAM(s) Oral two times a day  multivitamin 1 Tablet(s) Oral daily  pantoprazole    Tablet 40 milliGRAM(s) Oral before breakfast  sodium chloride 0.9% lock flush 20 milliLiter(s) IV Push once  sodium chloride 0.9%. 1000 milliLiter(s) IV Continuous <Continuous>      PRN MEDICATIONS  acetaminophen   Tablet .. 650 milliGRAM(s) Oral every 6 hours PRN  acetaminophen   Tablet .. 650 milliGRAM(s) Oral every 6 hours PRN  HYDROmorphone  Injectable 1.5 milliGRAM(s) IV Push every 4 hours PRN  HYDROmorphone  Injectable 0.5 milliGRAM(s) IV Push every 4 hours PRN  metoclopramide Injectable 10 milliGRAM(s) IV Push every 6 hours PRN  sodium chloride 0.9% lock flush 10 milliLiter(s) IV Push every 1 hour PRN  sodium chloride 0.9% lock flush 10 milliLiter(s) IV Push every 12 hours PRN        Vital Signs Last 24 Hrs  T(C): 37 (11 Nov 2018 05:19), Max: 37.3 (10 Nov 2018 17:28)  T(F): 98.6 (11 Nov 2018 05:19), Max: 99.2 (10 Nov 2018 17:28)  HR: 83 (11 Nov 2018 05:19) (75 - 92)  BP: 141/81 (11 Nov 2018 05:19) (127/83 - 151/89)  BP(mean): --  RR: 18 (11 Nov 2018 05:19) (18 - 18)  SpO2: 100% (11 Nov 2018 05:19) (96% - 100%)    PHYSICAL EXAM  General: NAD  HEENT:  clear oropharynx, anicteric sclera, Ommaya in place right side of head   CV: (+) S1/S2 RRR  Lungs: clear to auscultation, no wheezes or rales  Abdomen: soft, non-tender, non-distended (+) BS x 4Q  Ext: no edema  Skin: no rashes  Neuro: alert and oriented X 3  Central Line: PICC CDI       LABS:                        8.8    5.4   )-----------( 50       ( 11 Nov 2018 07:28 )             24.6         Mean Cell Volume : 93.7 fl  Mean Cell Hemoglobin : 33.5 pg  Mean Cell Hemoglobin Concentration : 35.8 gm/dL  Auto Neutrophil # : x  Auto Lymphocyte # : x  Auto Monocyte # : x  Auto Eosinophil # : x  Auto Basophil # : x  Auto Neutrophil % : x  Auto Lymphocyte % : x  Auto Monocyte % : x  Auto Eosinophil % : x  Auto Basophil % : x      11-11    139  |  103  |  10  ----------------------------<  95  4.0   |  24  |  0.56    Ca    10.5      11 Nov 2018 07:28  Phos  3.4     11-11  Mg     2.1     11-11    TPro  7.0  /  Alb  4.1  /  TBili  0.7  /  DBili  x   /  AST  15  /  ALT  19  /  AlkPhos  130<H>  11-11      Mg 2.1  Phos 3.4      Uric Acid 2.9        RECENT CULTURES:  11-05 @ 08:42  .Blood Blood-Catheter      No growth to date.  --  11-03 @ 08:39  .Blood Blood-Peripheral      No growth at 5 days.  --  11-03 @ 03:15  .Urine Clean Catch (Midstream)      No growth  --  11-02 @ 22:30  .Blood PICC/PERC Double Lumen BLUE  Blood Culture PCR  Blood Culture PCR  PCR    Growth in anaerobic bottle: Coag Negative Staphylococcus  "Susceptibilities not performed"  "Due to technical problems, Proteus sp. will Not be reported as part of  the BCID panel until further notice"  ***Blood Panel PCR results on this specimen are available  approximately 3 hours after the Gram stain result.***  Gram stain, PCR, and/or culture results may not always  correspond due to difference in methodologies.  ************************************************************  This PCR assay was performed using [employer].  The following targets are tested for: Enterococcus,  vancomycin resistant enterococci, Listeria monocytogenes,  coagulase negative staphylococci, S. aureus,  methicillin resistant S. aureus, Streptococcus agalactiae  (Group B), S. pneumoniae, S. pyogenes (Group A),  Acinetobacter baumannii, Enterobacter cloacae, E. coli,  Klebsiella oxytoca, K. pneumoniae, Proteus sp.,  Serratia marcescens, Haemophilus influenzae,  Neisseria meningitidis, Pseudomonas aeruginosa, Candida  albicans, C. glabrata, C krusei, C parapsilosis,  C. tropicalis and the KPC resistance gene.  --      RADIOLOGY & ADDITIONAL STUDIES:      CT Head No Cont (11.03.18 @ 15:56)     Impression: Stable ventricular size. The previously seen posterior   interhemispheric thin subdural hematoma has nearly resolved. Diagnosis: Relapsed ALL Ph (+)     Protocol/Chemo Regimen: Cycle 2 Inot[place]amab Days 1, 8 and 15    Day: 6    Pt endorsed: intermittent headache controlled with pain medications     Review of Systems: Denies nausea, vomiting, diarrhea, chest pain, SOB      Pain scale: denies    Diet: Regular  Allergies    No Known Drug Allergies  shellfish (Nausea; Vomiting)    Intolerances      ANTIMICROBIALS  cefepime   IVPB 2000 milliGRAM(s) IV Intermittent every 8 hours  micafungin IVPB 100 milliGRAM(s) IV Intermittent daily  vancomycin  IVPB 1000 milliGRAM(s) IV Intermittent every 12 hours      HEME/ONC MEDICATIONS  alteplase for catheter clearance 2 milliGRAM(s) Catheter once      STANDING MEDICATIONS  AQUAPHOR (petrolatum Ointment) 1 Application(s) Topical two times a day  influenza   Vaccine 0.5 milliLiter(s) IntraMuscular once  levETIRAcetam 500 milliGRAM(s) Oral two times a day  multivitamin 1 Tablet(s) Oral daily  pantoprazole    Tablet 40 milliGRAM(s) Oral before breakfast  sodium chloride 0.9% lock flush 20 milliLiter(s) IV Push once  sodium chloride 0.9%. 1000 milliLiter(s) IV Continuous <Continuous>      PRN MEDICATIONS  acetaminophen   Tablet .. 650 milliGRAM(s) Oral every 6 hours PRN  acetaminophen   Tablet .. 650 milliGRAM(s) Oral every 6 hours PRN  HYDROmorphone  Injectable 1.5 milliGRAM(s) IV Push every 4 hours PRN  HYDROmorphone  Injectable 0.5 milliGRAM(s) IV Push every 4 hours PRN  metoclopramide Injectable 10 milliGRAM(s) IV Push every 6 hours PRN  sodium chloride 0.9% lock flush 10 milliLiter(s) IV Push every 1 hour PRN  sodium chloride 0.9% lock flush 10 milliLiter(s) IV Push every 12 hours PRN        Vital Signs Last 24 Hrs  T(C): 37 (11 Nov 2018 05:19), Max: 37.3 (10 Nov 2018 17:28)  T(F): 98.6 (11 Nov 2018 05:19), Max: 99.2 (10 Nov 2018 17:28)  HR: 83 (11 Nov 2018 05:19) (75 - 92)  BP: 141/81 (11 Nov 2018 05:19) (127/83 - 151/89)  BP(mean): --  RR: 18 (11 Nov 2018 05:19) (18 - 18)  SpO2: 100% (11 Nov 2018 05:19) (96% - 100%)    PHYSICAL EXAM  General: NAD  HEENT:  clear oropharynx, anicteric sclera, Ommaya in place right side of head   CV: (+) S1/S2 RRR  Lungs: clear to auscultation, no wheezes or rales  Abdomen: soft, non-tender, non-distended (+) BS x 4Q  Ext: no edema  Skin: no rash  Neuro: alert and oriented X 3  Central Line: PICC CDI       LABS:                        8.8    5.4   )-----------( 50       ( 11 Nov 2018 07:28 )             24.6         Mean Cell Volume : 93.7 fl  Mean Cell Hemoglobin : 33.5 pg  Mean Cell Hemoglobin Concentration : 35.8 gm/dL  Auto Neutrophil # : x  Auto Lymphocyte # : x  Auto Monocyte # : x  Auto Eosinophil # : x  Auto Basophil # : x  Auto Neutrophil % : x  Auto Lymphocyte % : x  Auto Monocyte % : x  Auto Eosinophil % : x  Auto Basophil % : x      11-11    139  |  103  |  10  ----------------------------<  95  4.0   |  24  |  0.56    Ca    10.5      11 Nov 2018 07:28  Phos  3.4     11-11  Mg     2.1     11-11    TPro  7.0  /  Alb  4.1  /  TBili  0.7  /  DBili  x   /  AST  15  /  ALT  19  /  AlkPhos  130<H>  11-11      Mg 2.1  Phos 3.4      Uric Acid 2.9        RECENT CULTURES:  11-05 @ 08:42  .Blood Blood-Catheter      No growth to date.  --  11-03 @ 08:39  .Blood Blood-Peripheral      No growth at 5 days.  --  11-03 @ 03:15  .Urine Clean Catch (Midstream)      No growth  --  11-02 @ 22:30  .Blood PICC/PERC Double Lumen BLUE  Blood Culture PCR  Blood Culture PCR  PCR    Growth in anaerobic bottle: Coag Negative Staphylococcus  "Susceptibilities not performed"  "Due to technical problems, Proteus sp. will Not be reported as part of  the BCID panel until further notice"  ***Blood Panel PCR results on this specimen are available  approximately 3 hours after the Gram stain result.***  Gram stain, PCR, and/or culture results may not always  correspond due to difference in methodologies.  ************************************************************  This PCR assay was performed using [employer].  The following targets are tested for: Enterococcus,  vancomycin resistant enterococci, Listeria monocytogenes,  coagulase negative staphylococci, S. aureus,  methicillin resistant S. aureus, Streptococcus agalactiae  (Group B), S. pneumoniae, S. pyogenes (Group A),  Acinetobacter baumannii, Enterobacter cloacae, E. coli,  Klebsiella oxytoca, K. pneumoniae, Proteus sp.,  Serratia marcescens, Haemophilus influenzae,  Neisseria meningitidis, Pseudomonas aeruginosa, Candida  albicans, C. glabrata, C krusei, C parapsilosis,  C. tropicalis and the KPC resistance gene.  --      RADIOLOGY & ADDITIONAL STUDIES:      CT Head No Cont (11.03.18 @ 15:56)     Impression: Stable ventricular size. The previously seen posterior   interhemispheric thin subdural hematoma has nearly resolved.

## 2018-11-12 LAB
ALBUMIN SERPL ELPH-MCNC: 4.1 G/DL — SIGNIFICANT CHANGE UP (ref 3.3–5)
ALP SERPL-CCNC: 121 U/L — HIGH (ref 40–120)
ALT FLD-CCNC: 19 U/L — SIGNIFICANT CHANGE UP (ref 10–45)
ANION GAP SERPL CALC-SCNC: 10 MMOL/L — SIGNIFICANT CHANGE UP (ref 5–17)
APTT BLD: 30.2 SEC — SIGNIFICANT CHANGE UP (ref 27.5–36.3)
AST SERPL-CCNC: 20 U/L — SIGNIFICANT CHANGE UP (ref 10–40)
BASOPHILS # BLD AUTO: 0 K/UL — SIGNIFICANT CHANGE UP (ref 0–0.2)
BASOPHILS NFR BLD AUTO: 0.5 % — SIGNIFICANT CHANGE UP (ref 0–2)
BILIRUB SERPL-MCNC: 0.7 MG/DL — SIGNIFICANT CHANGE UP (ref 0.2–1.2)
BLD GP AB SCN SERPL QL: POSITIVE — SIGNIFICANT CHANGE UP
BUN SERPL-MCNC: 9 MG/DL — SIGNIFICANT CHANGE UP (ref 7–23)
CALCIUM SERPL-MCNC: 10.6 MG/DL — HIGH (ref 8.4–10.5)
CHLORIDE SERPL-SCNC: 104 MMOL/L — SIGNIFICANT CHANGE UP (ref 96–108)
CO2 SERPL-SCNC: 27 MMOL/L — SIGNIFICANT CHANGE UP (ref 22–31)
CREAT SERPL-MCNC: 0.59 MG/DL — SIGNIFICANT CHANGE UP (ref 0.5–1.3)
EOSINOPHIL # BLD AUTO: 0.2 K/UL — SIGNIFICANT CHANGE UP (ref 0–0.5)
EOSINOPHIL NFR BLD AUTO: 3 % — SIGNIFICANT CHANGE UP (ref 0–6)
FIBRINOGEN PPP-MCNC: 538 MG/DL — HIGH (ref 350–510)
GLUCOSE SERPL-MCNC: 94 MG/DL — SIGNIFICANT CHANGE UP (ref 70–99)
HCT VFR BLD CALC: 24.6 % — LOW (ref 34.5–45)
HGB BLD-MCNC: 8.7 G/DL — LOW (ref 11.5–15.5)
INR BLD: 0.97 RATIO — SIGNIFICANT CHANGE UP (ref 0.88–1.16)
LDH SERPL L TO P-CCNC: 251 U/L — HIGH (ref 50–242)
LYMPHOCYTES # BLD AUTO: 1.8 K/UL — SIGNIFICANT CHANGE UP (ref 1–3.3)
LYMPHOCYTES # BLD AUTO: 30.1 % — SIGNIFICANT CHANGE UP (ref 13–44)
MAGNESIUM SERPL-MCNC: 2.1 MG/DL — SIGNIFICANT CHANGE UP (ref 1.6–2.6)
MCHC RBC-ENTMCNC: 33.8 PG — SIGNIFICANT CHANGE UP (ref 27–34)
MCHC RBC-ENTMCNC: 35.4 GM/DL — SIGNIFICANT CHANGE UP (ref 32–36)
MCV RBC AUTO: 95.3 FL — SIGNIFICANT CHANGE UP (ref 80–100)
MONOCYTES # BLD AUTO: 1.4 K/UL — HIGH (ref 0–0.9)
MONOCYTES NFR BLD AUTO: 23.6 % — HIGH (ref 2–14)
NEUTROPHILS # BLD AUTO: 2.5 K/UL — SIGNIFICANT CHANGE UP (ref 1.8–7.4)
NEUTROPHILS NFR BLD AUTO: 42.7 % — LOW (ref 43–77)
PHOSPHATE SERPL-MCNC: 3.6 MG/DL — SIGNIFICANT CHANGE UP (ref 2.5–4.5)
PLATELET # BLD AUTO: 44 K/UL — LOW (ref 150–400)
POTASSIUM SERPL-MCNC: 3.8 MMOL/L — SIGNIFICANT CHANGE UP (ref 3.5–5.3)
POTASSIUM SERPL-SCNC: 3.8 MMOL/L — SIGNIFICANT CHANGE UP (ref 3.5–5.3)
PROT SERPL-MCNC: 6.9 G/DL — SIGNIFICANT CHANGE UP (ref 6–8.3)
PROTHROM AB SERPL-ACNC: 11.1 SEC — SIGNIFICANT CHANGE UP (ref 10–12.9)
RBC # BLD: 2.58 M/UL — LOW (ref 3.8–5.2)
RBC # FLD: 20.5 % — HIGH (ref 10.3–14.5)
RH IG SCN BLD-IMP: POSITIVE — SIGNIFICANT CHANGE UP
SODIUM SERPL-SCNC: 141 MMOL/L — SIGNIFICANT CHANGE UP (ref 135–145)
URATE SERPL-MCNC: 2.7 MG/DL — SIGNIFICANT CHANGE UP (ref 2.5–7)
VANCOMYCIN TROUGH SERPL-MCNC: 21.3 UG/ML — HIGH (ref 10–20)
WBC # BLD: 5.8 K/UL — SIGNIFICANT CHANGE UP (ref 3.8–10.5)
WBC # FLD AUTO: 5.8 K/UL — SIGNIFICANT CHANGE UP (ref 3.8–10.5)

## 2018-11-12 PROCEDURE — 99232 SBSQ HOSP IP/OBS MODERATE 35: CPT | Mod: GC

## 2018-11-12 RX ORDER — HYDROMORPHONE HYDROCHLORIDE 2 MG/ML
0.5 INJECTION INTRAMUSCULAR; INTRAVENOUS; SUBCUTANEOUS EVERY 4 HOURS
Qty: 0 | Refills: 0 | Status: DISCONTINUED | OUTPATIENT
Start: 2018-11-12 | End: 2018-11-15

## 2018-11-12 RX ORDER — HYDROMORPHONE HYDROCHLORIDE 2 MG/ML
1.5 INJECTION INTRAMUSCULAR; INTRAVENOUS; SUBCUTANEOUS EVERY 4 HOURS
Qty: 0 | Refills: 0 | Status: DISCONTINUED | OUTPATIENT
Start: 2018-11-12 | End: 2018-11-15

## 2018-11-12 RX ADMIN — Medication 1 TABLET(S): at 12:27

## 2018-11-12 RX ADMIN — HYDROMORPHONE HYDROCHLORIDE 1.5 MILLIGRAM(S): 2 INJECTION INTRAMUSCULAR; INTRAVENOUS; SUBCUTANEOUS at 20:55

## 2018-11-12 RX ADMIN — SODIUM CHLORIDE 50 MILLILITER(S): 9 INJECTION INTRAMUSCULAR; INTRAVENOUS; SUBCUTANEOUS at 05:57

## 2018-11-12 RX ADMIN — HYDROMORPHONE HYDROCHLORIDE 1.5 MILLIGRAM(S): 2 INJECTION INTRAMUSCULAR; INTRAVENOUS; SUBCUTANEOUS at 12:45

## 2018-11-12 RX ADMIN — Medication 250 MILLIGRAM(S): at 12:27

## 2018-11-12 RX ADMIN — HYDROMORPHONE HYDROCHLORIDE 1.5 MILLIGRAM(S): 2 INJECTION INTRAMUSCULAR; INTRAVENOUS; SUBCUTANEOUS at 00:49

## 2018-11-12 RX ADMIN — HYDROMORPHONE HYDROCHLORIDE 1.5 MILLIGRAM(S): 2 INJECTION INTRAMUSCULAR; INTRAVENOUS; SUBCUTANEOUS at 17:06

## 2018-11-12 RX ADMIN — Medication 250 MILLIGRAM(S): at 22:31

## 2018-11-12 RX ADMIN — HYDROMORPHONE HYDROCHLORIDE 1.5 MILLIGRAM(S): 2 INJECTION INTRAMUSCULAR; INTRAVENOUS; SUBCUTANEOUS at 12:25

## 2018-11-12 RX ADMIN — LEVETIRACETAM 500 MILLIGRAM(S): 250 TABLET, FILM COATED ORAL at 06:06

## 2018-11-12 RX ADMIN — HYDROMORPHONE HYDROCHLORIDE 1.5 MILLIGRAM(S): 2 INJECTION INTRAMUSCULAR; INTRAVENOUS; SUBCUTANEOUS at 06:11

## 2018-11-12 RX ADMIN — CEFEPIME 100 MILLIGRAM(S): 1 INJECTION, POWDER, FOR SOLUTION INTRAMUSCULAR; INTRAVENOUS at 21:02

## 2018-11-12 RX ADMIN — LEVETIRACETAM 500 MILLIGRAM(S): 250 TABLET, FILM COATED ORAL at 17:10

## 2018-11-12 RX ADMIN — PANTOPRAZOLE SODIUM 40 MILLIGRAM(S): 20 TABLET, DELAYED RELEASE ORAL at 06:06

## 2018-11-12 RX ADMIN — HYDROMORPHONE HYDROCHLORIDE 1.5 MILLIGRAM(S): 2 INJECTION INTRAMUSCULAR; INTRAVENOUS; SUBCUTANEOUS at 01:04

## 2018-11-12 RX ADMIN — HYDROMORPHONE HYDROCHLORIDE 1.5 MILLIGRAM(S): 2 INJECTION INTRAMUSCULAR; INTRAVENOUS; SUBCUTANEOUS at 17:26

## 2018-11-12 RX ADMIN — HYDROMORPHONE HYDROCHLORIDE 1.5 MILLIGRAM(S): 2 INJECTION INTRAMUSCULAR; INTRAVENOUS; SUBCUTANEOUS at 05:56

## 2018-11-12 RX ADMIN — MICAFUNGIN SODIUM 105 MILLIGRAM(S): 100 INJECTION, POWDER, LYOPHILIZED, FOR SOLUTION INTRAVENOUS at 12:26

## 2018-11-12 RX ADMIN — Medication 1 APPLICATION(S): at 06:06

## 2018-11-12 RX ADMIN — CEFEPIME 100 MILLIGRAM(S): 1 INJECTION, POWDER, FOR SOLUTION INTRAMUSCULAR; INTRAVENOUS at 06:13

## 2018-11-12 RX ADMIN — HYDROMORPHONE HYDROCHLORIDE 1.5 MILLIGRAM(S): 2 INJECTION INTRAMUSCULAR; INTRAVENOUS; SUBCUTANEOUS at 21:25

## 2018-11-12 RX ADMIN — Medication 1 APPLICATION(S): at 17:10

## 2018-11-12 RX ADMIN — CEFEPIME 100 MILLIGRAM(S): 1 INJECTION, POWDER, FOR SOLUTION INTRAMUSCULAR; INTRAVENOUS at 14:49

## 2018-11-12 NOTE — PROGRESS NOTE ADULT - SUBJECTIVE AND OBJECTIVE BOX
Diagnosis: Relapsed ALL Ph (+)     Protocol/Chemo Regimen: Cycle 2 Inotuzamab Days 1, 8 and 15    Day: 7    Pt endorsed: intermittent headache controlled with pain medications     Review of Systems: Denies nausea, vomiting, diarrhea, chest pain, SOB      Pain scale: denies    Diet: Regular  Allergies    No Known Drug Allergies  shellfish (Nausea; Vomiting)    ANTIMICROBIALS  cefepime   IVPB 2000 milliGRAM(s) IV Intermittent every 8 hours  micafungin IVPB 100 milliGRAM(s) IV Intermittent daily  vancomycin  IVPB 1000 milliGRAM(s) IV Intermittent every 12 hours      HEME/ONC MEDICATIONS  alteplase for catheter clearance 2 milliGRAM(s) Catheter once      STANDING MEDICATIONS  AQUAPHOR (petrolatum Ointment) 1 Application(s) Topical two times a day  influenza   Vaccine 0.5 milliLiter(s) IntraMuscular once  levETIRAcetam 500 milliGRAM(s) Oral two times a day  multivitamin 1 Tablet(s) Oral daily  pantoprazole    Tablet 40 milliGRAM(s) Oral before breakfast  sodium chloride 0.9% lock flush 20 milliLiter(s) IV Push once  sodium chloride 0.9%. 1000 milliLiter(s) IV Continuous <Continuous>      PRN MEDICATIONS  acetaminophen   Tablet .. 650 milliGRAM(s) Oral every 6 hours PRN  acetaminophen   Tablet .. 650 milliGRAM(s) Oral every 6 hours PRN  HYDROmorphone  Injectable 1.5 milliGRAM(s) IV Push every 4 hours PRN  HYDROmorphone  Injectable 0.5 milliGRAM(s) IV Push every 4 hours PRN  metoclopramide Injectable 10 milliGRAM(s) IV Push every 6 hours PRN  sodium chloride 0.9% lock flush 10 milliLiter(s) IV Push every 1 hour PRN  sodium chloride 0.9% lock flush 10 milliLiter(s) IV Push every 12 hours PRN      Vital Signs Last 24 Hrs  T(C): 36.8 (12 Nov 2018 04:53), Max: 37.3 (11 Nov 2018 23:55)  T(F): 98.2 (12 Nov 2018 04:53), Max: 99.1 (11 Nov 2018 23:55)  HR: 82 (12 Nov 2018 04:53) (74 - 92)  BP: 147/81 (12 Nov 2018 04:53) (120/70 - 155/85)  RR: 18 (12 Nov 2018 04:53) (18 - 20)  SpO2: 100% (12 Nov 2018 04:53) (98% - 100%)    PHYSICAL EXAM  General: adult in NAD  HEENT: clear oropharynx, anicteric sclera, pink conjunctiva  Neck: supple  CV: normal S1/S2 RRR  Lungs: positive air movement b/l ant lungs,clear to auscultation, no wheezes, no rales  Abdomen: soft non-tender non-distended  Ext: no clubbing cyanosis or edema  Skin: no rashes and no petechiae  Neuro: alert and oriented X 4, no focal deficits  Central Line: RUE PICC c/d/i    LABS:    Cultures:     Culture - Blood in AM (11.05.18 @ 08:42)    Specimen Source: .Blood Blood-Peripheral    Culture Results:   No growth at 5 days.    Culture - Blood in AM (11.05.18 @ 08:42)    Specimen Source: .Blood Blood-Catheter    Culture Results:   No growth at 5 days.                RADIOLOGY & ADDITIONAL STUDIES:\    from: Xray Sinuses Paranasal (11.09.18 @ 10:52)     Impression: The paranasal sinuses are grossly clear. There is a right   frontal approach shunt catheter in place with tip extending towards the   midline, unchanged. Diagnosis: Relapsed ALL Ph (+)     Protocol/Chemo Regimen: Cycle 2 Inotuzamab Days 1, 8 and 15    Day: 7    Pt endorsed: intermittent headache controlled with pain medications     Review of Systems: Denies nausea, vomiting, diarrhea, chest pain, SOB      Pain scale: denies    Diet: Regular  Allergies    No Known Drug Allergies  shellfish (Nausea; Vomiting)    ANTIMICROBIALS  cefepime   IVPB 2000 milliGRAM(s) IV Intermittent every 8 hours  micafungin IVPB 100 milliGRAM(s) IV Intermittent daily  vancomycin  IVPB 1000 milliGRAM(s) IV Intermittent every 12 hours      HEME/ONC MEDICATIONS  alteplase for catheter clearance 2 milliGRAM(s) Catheter once      STANDING MEDICATIONS  AQUAPHOR (petrolatum Ointment) 1 Application(s) Topical two times a day  influenza   Vaccine 0.5 milliLiter(s) IntraMuscular once  levETIRAcetam 500 milliGRAM(s) Oral two times a day  multivitamin 1 Tablet(s) Oral daily  pantoprazole    Tablet 40 milliGRAM(s) Oral before breakfast  sodium chloride 0.9% lock flush 20 milliLiter(s) IV Push once  sodium chloride 0.9%. 1000 milliLiter(s) IV Continuous <Continuous>      PRN MEDICATIONS  acetaminophen   Tablet .. 650 milliGRAM(s) Oral every 6 hours PRN  acetaminophen   Tablet .. 650 milliGRAM(s) Oral every 6 hours PRN  HYDROmorphone  Injectable 1.5 milliGRAM(s) IV Push every 4 hours PRN  HYDROmorphone  Injectable 0.5 milliGRAM(s) IV Push every 4 hours PRN  metoclopramide Injectable 10 milliGRAM(s) IV Push every 6 hours PRN  sodium chloride 0.9% lock flush 10 milliLiter(s) IV Push every 1 hour PRN  sodium chloride 0.9% lock flush 10 milliLiter(s) IV Push every 12 hours PRN      Vital Signs Last 24 Hrs  T(C): 36.8 (12 Nov 2018 04:53), Max: 37.3 (11 Nov 2018 23:55)  T(F): 98.2 (12 Nov 2018 04:53), Max: 99.1 (11 Nov 2018 23:55)  HR: 82 (12 Nov 2018 04:53) (74 - 92)  BP: 147/81 (12 Nov 2018 04:53) (120/70 - 155/85)  RR: 18 (12 Nov 2018 04:53) (18 - 20)  SpO2: 100% (12 Nov 2018 04:53) (98% - 100%)      PHYSICAL EXAM  General: adult in NAD  HEENT: clear oropharynx, anicteric sclera, pink conjunctiva  Neck: supple  CV: normal S1/S2 RRR  Lungs: positive air movement b/l ant lungs,clear to auscultation, no wheezes, no rales  Abdomen: soft non-tender non-distended  Ext: no clubbing cyanosis or edema  Skin: no rashes and no petechiae  Neuro: alert and oriented X 4, no focal deficits  Central Line: RUE PICC c/d/i    LABS:    Cultures:     Culture - Blood in AM (11.05.18 @ 08:42)    Specimen Source: .Blood Blood-Peripheral    Culture Results:   No growth at 5 days.    Culture - Blood in AM (11.05.18 @ 08:42)    Specimen Source: .Blood Blood-Catheter    Culture Results:   No growth at 5 days.                          8.7    5.8   )-----------( 44       ( 12 Nov 2018 07:23 )             24.6     12 Nov 2018 07:22    141    |  104    |  9      ----------------------------<  94     3.8     |  27     |  0.59     Ca    10.6       12 Nov 2018 07:22  Phos  3.6       12 Nov 2018 07:22  Mg     2.1       12 Nov 2018 07:22    TPro  6.9    /  Alb  4.1    /  TBili  0.7    /  DBili  x      /  AST  20     /  ALT  19     /  AlkPhos  121    12 Nov 2018 07:22    PT/INR - ( 12 Nov 2018 07:23 )   PT: 11.1 sec;   INR: 0.97 ratio    PTT - ( 12 Nov 2018 07:23 )  PTT:30.2 sec    LIVER FUNCTIONS - ( 12 Nov 2018 07:22 )  Alb: 4.1 g/dL / Pro: 6.9 g/dL / ALK PHOS: 121 U/L / ALT: 19 U/L / AST: 20 U/L / GGT: x                 RADIOLOGY & ADDITIONAL STUDIES:\    from: Xray Sinuses Paranasal (11.09.18 @ 10:52)     Impression: The paranasal sinuses are grossly clear. There is a right   frontal approach shunt catheter in place with tip extending towards the   midline, unchanged.

## 2018-11-12 NOTE — PROGRESS NOTE ADULT - ASSESSMENT
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 Patient is now receiving  Cycle 2  Inotuzumab on days 1, 8, 15. The patient's hospital course has been complicated by SDH, Gram positive bacteremia, subconjunctival hemorrhage, neutropenic fevers, transaminitis and upper GIB. The patient has thrombocytopenia and anemia  2/2 disease and/or chemotherapy.

## 2018-11-12 NOTE — PROGRESS NOTE ADULT - ATTENDING COMMENTS
57 YO F Ph(+) ALL s/p R HyperCVAD x 4 cycles IT MTX x2 s/p stem cell collection w/ Step 1(HD vp16 plus bernardino-c) regimen.  s/p Masha-Auto on 12/16/16. Pt was on Sprycel maintenance. 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 Has completed first cycle Inotuzumab and cycle 2 Inotuzumab started 11/6. BCR/ABL mutation analysis pending.  - Feels better. Headaches less, but still present. CT head(11/3)- SDH has nearly resolved. She reports improvement today.  -GI bleed has resolved, Continue po Protonix.  -Now afebrile. Had been febrile on 11/2. CT c/a/p 10/16- Scattered and patchy ground glass opacities in the right upper and lower lobes, possibly infection.  Heterogeneous thickening of the endometrial cavity.  Antibiotics adjusted to Cefepime/Mycamine per ID service. BCx (11/2) from PICC growing coag negative staph, she was started on Vanco IV, plan for 14 days - finish 11/16, ID followup. f/u repeat Cx's from PICC and peripherally NGTD  - goal plt ct >80K, but platelets are refractory to transfusion- receiving transfusion of 1/2 units over 3 hours twice a day. Keeping hb >7. Obtain HLA matched platelets as possible.  - OOB, ambulation.  - pretransplant testing 57 YO F Ph(+) ALL s/p R HyperCVAD x 4 cycles IT MTX x2 s/p stem cell collection w/ Step 1(HD vp16 plus bernardino-c) regimen.  s/p Masha-Auto on 12/16/16. Pt was on Sprycel maintenance. 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 Has completed first cycle Inotuzumab and cycle 2 Inotuzumab started 11/6. BCR/ABL mutation analysis pending.  - Feels better. Headaches less, but still present. CT head(11/3)- SDH has nearly resolved. She reports improvement today.  -GI bleed has resolved, Continue po Protonix.  -Now afebrile. Had been febrile on 11/2. CT c/a/p 10/16- Scattered and patchy ground glass opacities in the right upper and lower lobes, possibly infection.  Heterogeneous thickening of the endometrial cavity.  Antibiotics adjusted to Cefepime/Mycamine per ID service. BCx (11/2) from PICC growing coag negative staph, she was started on Vanco IV, plan for 14 days - finish 11/16, ID followup. repeat Cx's from PICC and peripherally NGTD  - goal plt ct >80K, but platelets are refractory to transfusion- receiving transfusion of 1/2 units over 3 hours twice a day, will try to decrease to once daily. Keeping hb >7. Obtain HLA matched platelets as possible.  - OOB, ambulation.  - pretransplant testing done

## 2018-11-13 LAB
ALBUMIN SERPL ELPH-MCNC: 4.1 G/DL — SIGNIFICANT CHANGE UP (ref 3.3–5)
ALP SERPL-CCNC: 121 U/L — HIGH (ref 40–120)
ALT FLD-CCNC: 19 U/L — SIGNIFICANT CHANGE UP (ref 10–45)
ANION GAP SERPL CALC-SCNC: 12 MMOL/L — SIGNIFICANT CHANGE UP (ref 5–17)
AST SERPL-CCNC: 22 U/L — SIGNIFICANT CHANGE UP (ref 10–40)
BASOPHILS # BLD AUTO: 0 K/UL — SIGNIFICANT CHANGE UP (ref 0–0.2)
BASOPHILS NFR BLD AUTO: 0.6 % — SIGNIFICANT CHANGE UP (ref 0–2)
BILIRUB SERPL-MCNC: 0.8 MG/DL — SIGNIFICANT CHANGE UP (ref 0.2–1.2)
BUN SERPL-MCNC: 12 MG/DL — SIGNIFICANT CHANGE UP (ref 7–23)
CALCIUM SERPL-MCNC: 10.9 MG/DL — HIGH (ref 8.4–10.5)
CHLORIDE SERPL-SCNC: 104 MMOL/L — SIGNIFICANT CHANGE UP (ref 96–108)
CO2 SERPL-SCNC: 25 MMOL/L — SIGNIFICANT CHANGE UP (ref 22–31)
CREAT SERPL-MCNC: 0.65 MG/DL — SIGNIFICANT CHANGE UP (ref 0.5–1.3)
EOSINOPHIL # BLD AUTO: 0.3 K/UL — SIGNIFICANT CHANGE UP (ref 0–0.5)
EOSINOPHIL NFR BLD AUTO: 4.8 % — SIGNIFICANT CHANGE UP (ref 0–6)
GLUCOSE SERPL-MCNC: 86 MG/DL — SIGNIFICANT CHANGE UP (ref 70–99)
HCT VFR BLD CALC: 26.5 % — LOW (ref 34.5–45)
HGB BLD-MCNC: 8.8 G/DL — LOW (ref 11.5–15.5)
LDH SERPL L TO P-CCNC: 287 U/L — HIGH (ref 50–242)
LYMPHOCYTES # BLD AUTO: 1.8 K/UL — SIGNIFICANT CHANGE UP (ref 1–3.3)
LYMPHOCYTES # BLD AUTO: 27.9 % — SIGNIFICANT CHANGE UP (ref 13–44)
MAGNESIUM SERPL-MCNC: 2.1 MG/DL — SIGNIFICANT CHANGE UP (ref 1.6–2.6)
MCHC RBC-ENTMCNC: 31.9 PG — SIGNIFICANT CHANGE UP (ref 27–34)
MCHC RBC-ENTMCNC: 33.3 GM/DL — SIGNIFICANT CHANGE UP (ref 32–36)
MCV RBC AUTO: 95.9 FL — SIGNIFICANT CHANGE UP (ref 80–100)
MONOCYTES # BLD AUTO: 1.5 K/UL — HIGH (ref 0–0.9)
MONOCYTES NFR BLD AUTO: 23.4 % — HIGH (ref 2–14)
NEUTROPHILS # BLD AUTO: 2.8 K/UL — SIGNIFICANT CHANGE UP (ref 1.8–7.4)
NEUTROPHILS NFR BLD AUTO: 43.3 % — SIGNIFICANT CHANGE UP (ref 43–77)
PHOSPHATE SERPL-MCNC: 3.6 MG/DL — SIGNIFICANT CHANGE UP (ref 2.5–4.5)
PLATELET # BLD AUTO: 45 K/UL — LOW (ref 150–400)
POTASSIUM SERPL-MCNC: 4.1 MMOL/L — SIGNIFICANT CHANGE UP (ref 3.5–5.3)
POTASSIUM SERPL-SCNC: 4.1 MMOL/L — SIGNIFICANT CHANGE UP (ref 3.5–5.3)
PROT SERPL-MCNC: 7 G/DL — SIGNIFICANT CHANGE UP (ref 6–8.3)
RBC # BLD: 2.76 M/UL — LOW (ref 3.8–5.2)
RBC # FLD: 20.7 % — HIGH (ref 10.3–14.5)
SODIUM SERPL-SCNC: 141 MMOL/L — SIGNIFICANT CHANGE UP (ref 135–145)
URATE SERPL-MCNC: 2.8 MG/DL — SIGNIFICANT CHANGE UP (ref 2.5–7)
VANCOMYCIN TROUGH SERPL-MCNC: 15 UG/ML — SIGNIFICANT CHANGE UP (ref 10–20)
WBC # BLD: 6.5 K/UL — SIGNIFICANT CHANGE UP (ref 3.8–10.5)
WBC # FLD AUTO: 6.5 K/UL — SIGNIFICANT CHANGE UP (ref 3.8–10.5)

## 2018-11-13 PROCEDURE — 99232 SBSQ HOSP IP/OBS MODERATE 35: CPT | Mod: GC

## 2018-11-13 RX ORDER — INOTUZUMAB OZOGAMICIN 0.25 MG/ML
1.06 INJECTION, POWDER, LYOPHILIZED, FOR SOLUTION INTRAVENOUS ONCE
Qty: 0 | Refills: 0 | Status: COMPLETED | OUTPATIENT
Start: 2018-11-13 | End: 2018-11-13

## 2018-11-13 RX ORDER — ACETAMINOPHEN 500 MG
650 TABLET ORAL ONCE
Qty: 0 | Refills: 0 | Status: COMPLETED | OUTPATIENT
Start: 2018-11-13 | End: 2018-11-13

## 2018-11-13 RX ORDER — HYDROCORTISONE 20 MG
100 TABLET ORAL ONCE
Qty: 0 | Refills: 0 | Status: COMPLETED | OUTPATIENT
Start: 2018-11-13 | End: 2018-11-13

## 2018-11-13 RX ORDER — DIPHENHYDRAMINE HCL 50 MG
50 CAPSULE ORAL ONCE
Qty: 0 | Refills: 0 | Status: COMPLETED | OUTPATIENT
Start: 2018-11-13 | End: 2018-11-13

## 2018-11-13 RX ADMIN — Medication 650 MILLIGRAM(S): at 13:44

## 2018-11-13 RX ADMIN — LEVETIRACETAM 500 MILLIGRAM(S): 250 TABLET, FILM COATED ORAL at 05:32

## 2018-11-13 RX ADMIN — CEFEPIME 100 MILLIGRAM(S): 1 INJECTION, POWDER, FOR SOLUTION INTRAMUSCULAR; INTRAVENOUS at 05:32

## 2018-11-13 RX ADMIN — Medication 1 APPLICATION(S): at 17:16

## 2018-11-13 RX ADMIN — HYDROMORPHONE HYDROCHLORIDE 1.5 MILLIGRAM(S): 2 INJECTION INTRAMUSCULAR; INTRAVENOUS; SUBCUTANEOUS at 11:23

## 2018-11-13 RX ADMIN — CEFEPIME 100 MILLIGRAM(S): 1 INJECTION, POWDER, FOR SOLUTION INTRAMUSCULAR; INTRAVENOUS at 13:00

## 2018-11-13 RX ADMIN — Medication 50 MILLIGRAM(S): at 13:44

## 2018-11-13 RX ADMIN — CEFEPIME 100 MILLIGRAM(S): 1 INJECTION, POWDER, FOR SOLUTION INTRAMUSCULAR; INTRAVENOUS at 21:06

## 2018-11-13 RX ADMIN — HYDROMORPHONE HYDROCHLORIDE 1.5 MILLIGRAM(S): 2 INJECTION INTRAMUSCULAR; INTRAVENOUS; SUBCUTANEOUS at 17:13

## 2018-11-13 RX ADMIN — Medication 250 MILLIGRAM(S): at 23:19

## 2018-11-13 RX ADMIN — INOTUZUMAB OZOGAMICIN 50 MILLIGRAM(S): 0.25 INJECTION, POWDER, LYOPHILIZED, FOR SOLUTION INTRAVENOUS at 14:58

## 2018-11-13 RX ADMIN — HYDROMORPHONE HYDROCHLORIDE 1.5 MILLIGRAM(S): 2 INJECTION INTRAMUSCULAR; INTRAVENOUS; SUBCUTANEOUS at 05:30

## 2018-11-13 RX ADMIN — Medication 1 APPLICATION(S): at 05:32

## 2018-11-13 RX ADMIN — HYDROMORPHONE HYDROCHLORIDE 1.5 MILLIGRAM(S): 2 INJECTION INTRAMUSCULAR; INTRAVENOUS; SUBCUTANEOUS at 21:06

## 2018-11-13 RX ADMIN — MICAFUNGIN SODIUM 105 MILLIGRAM(S): 100 INJECTION, POWDER, LYOPHILIZED, FOR SOLUTION INTRAVENOUS at 11:03

## 2018-11-13 RX ADMIN — HYDROMORPHONE HYDROCHLORIDE 1.5 MILLIGRAM(S): 2 INJECTION INTRAMUSCULAR; INTRAVENOUS; SUBCUTANEOUS at 21:36

## 2018-11-13 RX ADMIN — PANTOPRAZOLE SODIUM 40 MILLIGRAM(S): 20 TABLET, DELAYED RELEASE ORAL at 05:32

## 2018-11-13 RX ADMIN — HYDROMORPHONE HYDROCHLORIDE 1.5 MILLIGRAM(S): 2 INJECTION INTRAMUSCULAR; INTRAVENOUS; SUBCUTANEOUS at 11:03

## 2018-11-13 RX ADMIN — HYDROMORPHONE HYDROCHLORIDE 1.5 MILLIGRAM(S): 2 INJECTION INTRAMUSCULAR; INTRAVENOUS; SUBCUTANEOUS at 06:00

## 2018-11-13 RX ADMIN — Medication 1 TABLET(S): at 11:07

## 2018-11-13 RX ADMIN — HYDROMORPHONE HYDROCHLORIDE 1.5 MILLIGRAM(S): 2 INJECTION INTRAMUSCULAR; INTRAVENOUS; SUBCUTANEOUS at 01:14

## 2018-11-13 RX ADMIN — Medication 250 MILLIGRAM(S): at 12:54

## 2018-11-13 RX ADMIN — HYDROMORPHONE HYDROCHLORIDE 1.5 MILLIGRAM(S): 2 INJECTION INTRAMUSCULAR; INTRAVENOUS; SUBCUTANEOUS at 01:44

## 2018-11-13 RX ADMIN — Medication 100 MILLIGRAM(S): at 13:44

## 2018-11-13 RX ADMIN — LEVETIRACETAM 500 MILLIGRAM(S): 250 TABLET, FILM COATED ORAL at 17:16

## 2018-11-13 NOTE — PROGRESS NOTE ADULT - ASSESSMENT
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 Patient is now receiving  Cycle 2  Inotuzumab on days 1, 8, 15. The patient's hospital course has been complicated by SDH, Gram positive bacteremia, subconjunctival hemorrhage, neutropenic fevers, transaminitis and upper GIB. The patient has thrombocytopenia and anemia  2/2 disease and/or chemotherapy.

## 2018-11-13 NOTE — PROGRESS NOTE ADULT - PROBLEM SELECTOR PLAN 2
Patient is not neutropenic, afebrile   Continue Mycamine for ppx (No azoles with Inotuzamab), Cefepime  Vancomycin added for BC gram + cocci in clusters on 11/2  complete 14 day course IV Vanco through 11/16 Patient is not neutropenic, afebrile   Continue Mycamine for ppx (No azoles with Inotuzumab), Cefepime  Vancomycin added for BC gram + cocci in clusters on 11/2  recheck Vanco Trough today  complete 14 day course IV Vanco through 11/16

## 2018-11-13 NOTE — PROGRESS NOTE ADULT - ATTENDING COMMENTS
57 YO F Ph(+) ALL s/p R HyperCVAD x 4 cycles IT MTX x2 s/p stem cell collection w/ Step 1(HD vp16 plus bernardino-c) regimen.  s/p Masha-Auto on 12/16/16. Pt was on Sprycel maintenance. 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 Has completed first cycle Inotuzumab and cycle 2 Inotuzumab started 11/6. BCR/ABL mutation analysis pending.  - Feels better. Headaches less, but still present. CT head(11/3)- SDH has nearly resolved. She reports improvement today.  -GI bleed has resolved, Continue po Protonix.  -Now afebrile. Had been febrile on 11/2. CT c/a/p 10/16- Scattered and patchy ground glass opacities in the right upper and lower lobes, possibly infection.  Heterogeneous thickening of the endometrial cavity.  Antibiotics adjusted to Cefepime/Mycamine per ID service. BCx (11/2) from PICC growing coag negative staph, she was started on Vanco IV, plan for 14 days - finish 11/16, ID followup. repeat Cx's from PICC and peripherally NGTD  - goal plt ct >80K, but platelets are refractory to transfusion- receiving transfusion of 1/2 units over 3 hours twice a day, will try to decrease to once daily. Keeping hb >7. Obtain HLA matched platelets as possible.  - OOB, ambulation.  - pretransplant testing done 59 YO F Ph(+) ALL s/p R HyperCVAD x 4 cycles IT MTX x2 s/p stem cell collection w/ Step 1(HD vp16 plus bernardino-c) regimen.  s/p Masha-Auto on 12/16/16. Pt was on Sprycel maintenance. 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 Has completed first cycle Inotuzumab and cycle 2 Inotuzumab started 11/6. BCR/ABL mutation analysis pending.  - Feels better. Headaches less, but still present. CT head(11/3)- SDH has nearly resolved. She reports improvement today.  -GI bleed has resolved, Continue po Protonix.  -Now afebrile. Had been febrile on 11/2. CT c/a/p 10/16- Scattered and patchy ground glass opacities in the right upper and lower lobes, possibly infection.  Heterogeneous thickening of the endometrial cavity.  Antibiotics adjusted to Cefepime/Mycamine per ID service. BCx (11/2) from PICC growing coag negative staph, she was started on Vanco IV, plan for 14 days - finish 11/16, ID followup. repeat Cx's from PICC and peripherally NGTD  - goal plt ct >80K, but platelets are refractory to transfusion- receiving transfusion of 1/2 units over 3 hours twice a day, will try to decrease frequency of transfusions and monitor plts closely. Keeping hb >7. Obtain HLA matched platelets as possible.  - OOB, ambulation.  - pretransplant testing done

## 2018-11-13 NOTE — PROGRESS NOTE ADULT - PROBLEM SELECTOR PLAN 1
10/5 Peripheral flow confirms relapse with BCR/ABL rearrangement in 79.5% of cells  s/p Cycle 1 Inotuzamab (Day 1, 8 and 15).  now on cycle Cycle 2 started 11/6.  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  now on cycle Cycle 2 started 11/6.  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

## 2018-11-13 NOTE — ADVANCED PRACTICE NURSE CONSULT - ASSESSMENT
Pt. seen in bed a/o x4,denies any discomfort at this time.Pt. verbalized understanding of chemotherapy infusion.Pt. has right double lumen PICC .Dsg dry and intact.Site with no s/s of bleeding,redness or swelling.With positive blood return noted and flushing easily with 10 ml NS on the red port.Drug verification done by 2 RN.s.Lab values reviewed on rounds by 2 RN.'s.Pt. premedicated with tylenol 650 mg po,benadryl 50 mg IV and hydrocortisone 100mg IVP.At 1458  BESPONSA 1.06 milliGRAM(s) in sodium chloride 0.9% 45.76 milliLiter(s), IV Intermittent, once, infuse over 60 Minutes infusing well to PICC via alaris pump.Administration Instructions: PROTECT FROM LIGHT.Pt. tolerating well.Primary RN aware of present treatment.Left pt. comforatble in bed.    .      .

## 2018-11-13 NOTE — PROGRESS NOTE ADULT - SUBJECTIVE AND OBJECTIVE BOX
Diagnosis: Relapsed ALL Ph (+)     Protocol/Chemo Regimen: Cycle 2 Inotuzamab Days 1, 8 and 15    Day: 8    Pt endorsed: intermittent headache controlled with pain medications     Review of Systems: Denies nausea, vomiting, diarrhea, chest pain, SOB      Pain scale: denies    Diet: Regular  Allergies    No Known Drug Allergies  shellfish (Nausea; Vomiting)    ANTIMICROBIALS  cefepime   IVPB 2000 milliGRAM(s) IV Intermittent every 8 hours  micafungin IVPB 100 milliGRAM(s) IV Intermittent daily  vancomycin  IVPB 1000 milliGRAM(s) IV Intermittent every 12 hours      HEME/ONC MEDICATIONS  alteplase for catheter clearance 2 milliGRAM(s) Catheter once      STANDING MEDICATIONS  AQUAPHOR (petrolatum Ointment) 1 Application(s) Topical two times a day  influenza   Vaccine 0.5 milliLiter(s) IntraMuscular once  levETIRAcetam 500 milliGRAM(s) Oral two times a day  multivitamin 1 Tablet(s) Oral daily  pantoprazole    Tablet 40 milliGRAM(s) Oral before breakfast  sodium chloride 0.9% lock flush 20 milliLiter(s) IV Push once  sodium chloride 0.9%. 1000 milliLiter(s) IV Continuous <Continuous>      PRN MEDICATIONS  acetaminophen   Tablet .. 650 milliGRAM(s) Oral every 6 hours PRN  acetaminophen   Tablet .. 650 milliGRAM(s) Oral every 6 hours PRN  HYDROmorphone  Injectable 1.5 milliGRAM(s) IV Push every 4 hours PRN  HYDROmorphone  Injectable 0.5 milliGRAM(s) IV Push every 4 hours PRN  metoclopramide Injectable 10 milliGRAM(s) IV Push every 6 hours PRN  sodium chloride 0.9% lock flush 10 milliLiter(s) IV Push every 1 hour PRN  sodium chloride 0.9% lock flush 10 milliLiter(s) IV Push every 12 hours PRN      Vital Signs Last 24 Hrs  T(C): 37 (13 Nov 2018 05:17), Max: 37.4 (12 Nov 2018 21:00)  T(F): 98.6 (13 Nov 2018 05:17), Max: 99.4 (12 Nov 2018 21:00)  HR: 78 (13 Nov 2018 05:17) (73 - 97)  BP: 144/85 (13 Nov 2018 05:17) (142/86 - 156/95)  RR: 18 (13 Nov 2018 05:17) (17 - 18)  SpO2: 100% (13 Nov 2018 05:17) (98% - 100%)    PHYSICAL EXAM  General: adult in NAD  HEENT: clear oropharynx, anicteric sclera, pink conjunctiva  Neck: supple  CV: normal S1/S2 RRR  Lungs: positive air movement b/l ant lungs,clear to auscultation, no wheezes, no rales  Abdomen: soft non-tender non-distended  Ext: no clubbing cyanosis or edema  Skin: no rashes and no petechiae  Neuro: alert and oriented X 4, no focal deficits  Central Line: RUE PICC c/d/i    LABS:    Cultures:     Culture - Blood in AM (11.05.18 @ 08:42)    Specimen Source: .Blood Blood-Peripheral    Culture Results:   No growth at 5 days.      Culture - Blood in AM (11.05.18 @ 08:42)    Specimen Source: .Blood Blood-Catheter    Culture Results:   No growth at 5 days.                            8.8    6.5   )-----------( 45       ( 13 Nov 2018 06:57 )             26.5       Mean Cell Volume : 95.9 fl  Mean Cell Hemoglobin : 31.9 pg  Mean Cell Hemoglobin Concentration : 33.3 gm/dL  Auto Neutrophil # : x  Auto Lymphocyte # : x  Auto Monocyte # : x  Auto Eosinophil # : x  Auto Basophil # : x  Auto Neutrophil % : x  Auto Lymphocyte % : x  Auto Monocyte % : x  Auto Eosinophil % : x  Auto Basophil % : x      RADIOLOGY & ADDITIONAL STUDIES:    from: Xray Sinuses Paranasal (11.09.18 @ 10:52)     INTERPRETATION:  Clinical information:     Relapse ALL on chemotherapy.    4 views of the paranasal sinuses are compared to 8/2/2016.    Impression: The paranasal sinuses are grossly clear. There is a right   frontal approach shunt catheter in place with tip extending towards the   midline, unchanged. Diagnosis: Relapsed ALL Ph (+)     Protocol/Chemo Regimen: Cycle 2 Inotuzamab Days 1, 8 and 15    Day: 8    Pt endorsed: intermittent headache controlled with pain medications     Review of Systems: Denies nausea, vomiting, diarrhea, chest pain, SOB      Pain scale: denies    Diet: Regular  Allergies    No Known Drug Allergies  shellfish (Nausea; Vomiting)    ANTIMICROBIALS  cefepime   IVPB 2000 milliGRAM(s) IV Intermittent every 8 hours  micafungin IVPB 100 milliGRAM(s) IV Intermittent daily  vancomycin  IVPB 1000 milliGRAM(s) IV Intermittent every 12 hours      HEME/ONC MEDICATIONS  alteplase for catheter clearance 2 milliGRAM(s) Catheter once      STANDING MEDICATIONS  AQUAPHOR (petrolatum Ointment) 1 Application(s) Topical two times a day  influenza   Vaccine 0.5 milliLiter(s) IntraMuscular once  levETIRAcetam 500 milliGRAM(s) Oral two times a day  multivitamin 1 Tablet(s) Oral daily  pantoprazole    Tablet 40 milliGRAM(s) Oral before breakfast  sodium chloride 0.9% lock flush 20 milliLiter(s) IV Push once  sodium chloride 0.9%. 1000 milliLiter(s) IV Continuous <Continuous>      PRN MEDICATIONS  acetaminophen   Tablet .. 650 milliGRAM(s) Oral every 6 hours PRN  acetaminophen   Tablet .. 650 milliGRAM(s) Oral every 6 hours PRN  HYDROmorphone  Injectable 1.5 milliGRAM(s) IV Push every 4 hours PRN  HYDROmorphone  Injectable 0.5 milliGRAM(s) IV Push every 4 hours PRN  metoclopramide Injectable 10 milliGRAM(s) IV Push every 6 hours PRN  sodium chloride 0.9% lock flush 10 milliLiter(s) IV Push every 1 hour PRN  sodium chloride 0.9% lock flush 10 milliLiter(s) IV Push every 12 hours PRN      Vital Signs Last 24 Hrs  T(C): 37 (13 Nov 2018 05:17), Max: 37.4 (12 Nov 2018 21:00)  T(F): 98.6 (13 Nov 2018 05:17), Max: 99.4 (12 Nov 2018 21:00)  HR: 78 (13 Nov 2018 05:17) (73 - 97)  BP: 144/85 (13 Nov 2018 05:17) (142/86 - 156/95)  RR: 18 (13 Nov 2018 05:17) (17 - 18)  SpO2: 100% (13 Nov 2018 05:17) (98% - 100%)    PHYSICAL EXAM  General: adult in NAD  HEENT: clear oropharynx, anicteric sclera, pink conjunctiva  Neck: supple  CV: normal S1/S2 RRR  Lungs: positive air movement b/l ant lungs,clear to auscultation, no wheezes, no rales  Abdomen: soft non-tender non-distended  Ext: no clubbing cyanosis or edema  Skin: no rashes and no petechiae  Neuro: alert and oriented X 4, no focal deficits  Central Line: RUE PICC c/d/i    LABS:    Cultures:     Culture - Blood in AM (11.05.18 @ 08:42)    Specimen Source: .Blood Blood-Peripheral    Culture Results:   No growth at 5 days.      Culture - Blood in AM (11.05.18 @ 08:42)    Specimen Source: .Blood Blood-Catheter    Culture Results:   No growth at 5 days.                          8.8    6.5   )-----------( 45       ( 13 Nov 2018 06:57 )             26.5     13 Nov 2018 07:12    141    |  104    |  12     ----------------------------<  86     4.1     |  25     |  0.65     Ca    10.9       13 Nov 2018 07:12  Phos  3.6       13 Nov 2018 07:12  Mg     2.1       13 Nov 2018 07:12    TPro  7.0    /  Alb  4.1    /  TBili  0.8    /  DBili  x      /  AST  22     /  ALT  19     /  AlkPhos  121    13 Nov 2018 07:12    PT/INR - ( 12 Nov 2018 07:23 )   PT: 11.1 sec;   INR: 0.97 ratio    PTT - ( 12 Nov 2018 07:23 )  PTT:30.2 sec      LIVER FUNCTIONS - ( 13 Nov 2018 07:12 )  Alb: 4.1 g/dL / Pro: 7.0 g/dL / ALK PHOS: 121 U/L / ALT: 19 U/L / AST: 22 U/L / GGT: x                     RADIOLOGY & ADDITIONAL STUDIES:    from: Xray Sinuses Paranasal (11.09.18 @ 10:52)     INTERPRETATION:  Clinical information:     Relapse ALL on chemotherapy.    4 views of the paranasal sinuses are compared to 8/2/2016.    Impression: The paranasal sinuses are grossly clear. There is a right   frontal approach shunt catheter in place with tip extending towards the   midline, unchanged.

## 2018-11-14 LAB
ALBUMIN SERPL ELPH-MCNC: 3.8 G/DL — SIGNIFICANT CHANGE UP (ref 3.3–5)
ALP SERPL-CCNC: 111 U/L — SIGNIFICANT CHANGE UP (ref 40–120)
ALT FLD-CCNC: 16 U/L — SIGNIFICANT CHANGE UP (ref 10–45)
ANION GAP SERPL CALC-SCNC: 9 MMOL/L — SIGNIFICANT CHANGE UP (ref 5–17)
AST SERPL-CCNC: 17 U/L — SIGNIFICANT CHANGE UP (ref 10–40)
BASOPHILS # BLD AUTO: 0.1 K/UL — SIGNIFICANT CHANGE UP (ref 0–0.2)
BASOPHILS NFR BLD AUTO: 0.7 % — SIGNIFICANT CHANGE UP (ref 0–2)
BILIRUB SERPL-MCNC: 0.7 MG/DL — SIGNIFICANT CHANGE UP (ref 0.2–1.2)
BUN SERPL-MCNC: 12 MG/DL — SIGNIFICANT CHANGE UP (ref 7–23)
CALCIUM SERPL-MCNC: 10.8 MG/DL — HIGH (ref 8.4–10.5)
CHLORIDE SERPL-SCNC: 105 MMOL/L — SIGNIFICANT CHANGE UP (ref 96–108)
CO2 SERPL-SCNC: 26 MMOL/L — SIGNIFICANT CHANGE UP (ref 22–31)
CREAT SERPL-MCNC: 0.63 MG/DL — SIGNIFICANT CHANGE UP (ref 0.5–1.3)
EOSINOPHIL # BLD AUTO: 0.1 K/UL — SIGNIFICANT CHANGE UP (ref 0–0.5)
EOSINOPHIL NFR BLD AUTO: 1.4 % — SIGNIFICANT CHANGE UP (ref 0–6)
GLUCOSE SERPL-MCNC: 81 MG/DL — SIGNIFICANT CHANGE UP (ref 70–99)
HCT VFR BLD CALC: 24.9 % — LOW (ref 34.5–45)
HGB BLD-MCNC: 8.6 G/DL — LOW (ref 11.5–15.5)
LDH SERPL L TO P-CCNC: 255 U/L — HIGH (ref 50–242)
LYMPHOCYTES # BLD AUTO: 1.7 K/UL — SIGNIFICANT CHANGE UP (ref 1–3.3)
LYMPHOCYTES # BLD AUTO: 25.2 % — SIGNIFICANT CHANGE UP (ref 13–44)
MAGNESIUM SERPL-MCNC: 2.2 MG/DL — SIGNIFICANT CHANGE UP (ref 1.6–2.6)
MCHC RBC-ENTMCNC: 33.7 PG — SIGNIFICANT CHANGE UP (ref 27–34)
MCHC RBC-ENTMCNC: 34.7 GM/DL — SIGNIFICANT CHANGE UP (ref 32–36)
MCV RBC AUTO: 97.1 FL — SIGNIFICANT CHANGE UP (ref 80–100)
MONOCYTES # BLD AUTO: 1.4 K/UL — HIGH (ref 0–0.9)
MONOCYTES NFR BLD AUTO: 20.1 % — HIGH (ref 2–14)
NEUTROPHILS # BLD AUTO: 3.6 K/UL — SIGNIFICANT CHANGE UP (ref 1.8–7.4)
NEUTROPHILS NFR BLD AUTO: 52.6 % — SIGNIFICANT CHANGE UP (ref 43–77)
PHOSPHATE SERPL-MCNC: 3.5 MG/DL — SIGNIFICANT CHANGE UP (ref 2.5–4.5)
PLATELET # BLD AUTO: 46 K/UL — LOW (ref 150–400)
POTASSIUM SERPL-MCNC: 3.9 MMOL/L — SIGNIFICANT CHANGE UP (ref 3.5–5.3)
POTASSIUM SERPL-SCNC: 3.9 MMOL/L — SIGNIFICANT CHANGE UP (ref 3.5–5.3)
PROT SERPL-MCNC: 6.7 G/DL — SIGNIFICANT CHANGE UP (ref 6–8.3)
RBC # BLD: 2.56 M/UL — LOW (ref 3.8–5.2)
RBC # FLD: 20.6 % — HIGH (ref 10.3–14.5)
SODIUM SERPL-SCNC: 140 MMOL/L — SIGNIFICANT CHANGE UP (ref 135–145)
URATE SERPL-MCNC: 2.7 MG/DL — SIGNIFICANT CHANGE UP (ref 2.5–7)
WBC # BLD: 6.9 K/UL — SIGNIFICANT CHANGE UP (ref 3.8–10.5)
WBC # FLD AUTO: 6.9 K/UL — SIGNIFICANT CHANGE UP (ref 3.8–10.5)

## 2018-11-14 PROCEDURE — 99232 SBSQ HOSP IP/OBS MODERATE 35: CPT | Mod: GC

## 2018-11-14 RX ADMIN — HYDROMORPHONE HYDROCHLORIDE 1.5 MILLIGRAM(S): 2 INJECTION INTRAMUSCULAR; INTRAVENOUS; SUBCUTANEOUS at 21:04

## 2018-11-14 RX ADMIN — HYDROMORPHONE HYDROCHLORIDE 1.5 MILLIGRAM(S): 2 INJECTION INTRAMUSCULAR; INTRAVENOUS; SUBCUTANEOUS at 05:16

## 2018-11-14 RX ADMIN — HYDROMORPHONE HYDROCHLORIDE 1.5 MILLIGRAM(S): 2 INJECTION INTRAMUSCULAR; INTRAVENOUS; SUBCUTANEOUS at 12:53

## 2018-11-14 RX ADMIN — Medication 1 APPLICATION(S): at 17:24

## 2018-11-14 RX ADMIN — HYDROMORPHONE HYDROCHLORIDE 1.5 MILLIGRAM(S): 2 INJECTION INTRAMUSCULAR; INTRAVENOUS; SUBCUTANEOUS at 01:07

## 2018-11-14 RX ADMIN — LEVETIRACETAM 500 MILLIGRAM(S): 250 TABLET, FILM COATED ORAL at 05:15

## 2018-11-14 RX ADMIN — Medication 1 APPLICATION(S): at 05:17

## 2018-11-14 RX ADMIN — CEFEPIME 100 MILLIGRAM(S): 1 INJECTION, POWDER, FOR SOLUTION INTRAMUSCULAR; INTRAVENOUS at 14:06

## 2018-11-14 RX ADMIN — HYDROMORPHONE HYDROCHLORIDE 1.5 MILLIGRAM(S): 2 INJECTION INTRAMUSCULAR; INTRAVENOUS; SUBCUTANEOUS at 01:37

## 2018-11-14 RX ADMIN — Medication 1 TABLET(S): at 11:35

## 2018-11-14 RX ADMIN — SODIUM CHLORIDE 50 MILLILITER(S): 9 INJECTION INTRAMUSCULAR; INTRAVENOUS; SUBCUTANEOUS at 17:23

## 2018-11-14 RX ADMIN — CEFEPIME 100 MILLIGRAM(S): 1 INJECTION, POWDER, FOR SOLUTION INTRAMUSCULAR; INTRAVENOUS at 05:15

## 2018-11-14 RX ADMIN — HYDROMORPHONE HYDROCHLORIDE 1.5 MILLIGRAM(S): 2 INJECTION INTRAMUSCULAR; INTRAVENOUS; SUBCUTANEOUS at 16:45

## 2018-11-14 RX ADMIN — HYDROMORPHONE HYDROCHLORIDE 1.5 MILLIGRAM(S): 2 INJECTION INTRAMUSCULAR; INTRAVENOUS; SUBCUTANEOUS at 12:23

## 2018-11-14 RX ADMIN — MICAFUNGIN SODIUM 105 MILLIGRAM(S): 100 INJECTION, POWDER, LYOPHILIZED, FOR SOLUTION INTRAVENOUS at 11:35

## 2018-11-14 RX ADMIN — LEVETIRACETAM 500 MILLIGRAM(S): 250 TABLET, FILM COATED ORAL at 17:23

## 2018-11-14 RX ADMIN — HYDROMORPHONE HYDROCHLORIDE 1.5 MILLIGRAM(S): 2 INJECTION INTRAMUSCULAR; INTRAVENOUS; SUBCUTANEOUS at 21:34

## 2018-11-14 RX ADMIN — CEFEPIME 100 MILLIGRAM(S): 1 INJECTION, POWDER, FOR SOLUTION INTRAMUSCULAR; INTRAVENOUS at 21:05

## 2018-11-14 RX ADMIN — HYDROMORPHONE HYDROCHLORIDE 1.5 MILLIGRAM(S): 2 INJECTION INTRAMUSCULAR; INTRAVENOUS; SUBCUTANEOUS at 05:46

## 2018-11-14 RX ADMIN — Medication 250 MILLIGRAM(S): at 23:20

## 2018-11-14 RX ADMIN — HYDROMORPHONE HYDROCHLORIDE 1.5 MILLIGRAM(S): 2 INJECTION INTRAMUSCULAR; INTRAVENOUS; SUBCUTANEOUS at 16:15

## 2018-11-14 RX ADMIN — PANTOPRAZOLE SODIUM 40 MILLIGRAM(S): 20 TABLET, DELAYED RELEASE ORAL at 05:15

## 2018-11-14 NOTE — PROGRESS NOTE ADULT - ATTENDING COMMENTS
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  s/p Masha-Auto on 12/16/16. Pt was on Sprycel maintenance. 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 Has completed first cycle Inotuzumab and cycle 2 Inotuzumab started 11/6. BCR/ABL mutation analysis pending.  - Feels better. Headaches less, but still present. CT head(11/3)- SDH has nearly resolved. She reports improvement today.  -GI bleed has resolved, Continue po Protonix.  -Now afebrile. Had been febrile on 11/2. CT c/a/p 10/16- Scattered and patchy ground glass opacities in the right upper and lower lobes, possibly infection.  Heterogeneous thickening of the endometrial cavity.  Antibiotics adjusted to Cefepime/Mycamine per ID service. BCx (11/2) from PICC growing coag negative staph, she was started on Vanco IV, plan for 14 days - finish 11/16, ID followup. repeat Cx's from PICC and peripherally NGTD  - goal plt ct >80K, but platelets are refractory to transfusion- receiving transfusion of 1/2 units over 3 hours twice a day, will try to decrease frequency of transfusions and monitor plts closely. Keeping hb >7. Obtain HLA matched platelets as possible.  - OOB, ambulation.  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  s/p Masha-Auto on 12/16/16. Pt was on Sprycel maintenance. 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 Has completed first cycle Inotuzumab and cycle 2 Inotuzumab started 11/6. BCR/ABL mutation analysis pending.  - Feels better. Headaches less, but still present. CT head(11/3)- SDH has nearly resolved.   -GI bleed has resolved, Continue po Protonix.  -Now afebrile. Had been febrile on 11/2. CT c/a/p 10/16- Scattered and patchy ground glass opacities in the right upper and lower lobes, possibly infection.  Heterogeneous thickening of the endometrial cavity.  Antibiotics adjusted to Cefepime/Mycamine per ID service. BCx (11/2) from PICC growing coag negative staph, she was started on Vanco IV, plan for 14 days - finish 11/16, ID followup. repeat Cx's from PICC and peripherally NGTD  - goal plt ct >80K, but platelets are refractory to transfusion- receiving transfusion of 1/2 units over 3 hours twice a day, will try to decrease frequency of transfusions since no improvement in counts and monitor plts closely. Keeping hb >7.   - OOB, ambulation.  - pretransplant testing done

## 2018-11-14 NOTE — PROGRESS NOTE ADULT - PROBLEM SELECTOR PLAN 2
Patient is not neutropenic, afebrile   Continue Mycamine for ppx (No azoles with Inotuzumab), Cefepime  Vancomycin added for BC gram + cocci in clusters on 11/2  recheck Vanco Trough today  complete 14 day course IV Vanco through 11/16

## 2018-11-14 NOTE — PROGRESS NOTE ADULT - PROBLEM SELECTOR PLAN 1
10/5 Peripheral flow confirms relapse with BCR/ABL rearrangement in 79.5% of cells  s/p Cycle 1 Inotuzamab (Day 1, 8 and 15).  now on cycle Cycle 2 started 11/6.  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  now on cycle Cycle 2 started 11/6.  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

## 2018-11-14 NOTE — CHART NOTE - NSCHARTNOTESELECT_GEN_ALL_CORE
Event Note/Fever
Event Note/Fever
Fever/Event Note
Change platelets/Event Note
Event Note/Fever
Event Note/Fever
Event Note/Night NP-Episodic
Neurosurgery/Event Note
Nutrition Services
fever/Event Note

## 2018-11-14 NOTE — CHART NOTE - NSCHARTNOTEFT_GEN_A_CORE
Nutrition Follow Up Note    Hospital course as per chart 59 yo. female with a PMH of HTN, obesity, PH (+) ALL S/P chemotherapy, autologous PBSCT. Admitted for headache, relapse of ALL compliacted by SDH, gram positive bacteremia, Upper GIB, thrombocytopenia and anemia 2/2 to disease or chemotherapy. Patient is currently undergoing cycle 2 of inotazamab.     Source: Patient, medical record, previous RD note    Diet : Regular, ensure enlive 2x daily    Patient reports: Improved appetite and PO intake, finishing 75% of her meals. For breakfast patient has been usually eating hard boiled eggs, cereal and phan. For lunch/dinner she usually orders meat with baked potato. Patient reports drinking ensure enlive daily and consumes milkshakes from shake it up program when offered. Reports feeling nauseous from odors of certain foods. Provided patient foods to try to avoid such as hot steak to prevent nausea feeling. No chewing or swallowing difficulties reported. Last BM was     Daily Weight in k.2 (11-14), Weight in k.1 (11-13), Weight in k.2 (11-12), Weight in k.6 (11-11), Weight in k.8 (11-10), Weight in k.3 (11-09), Weight in k.5 (11-08)  % Weight Change    Pertinent Medications: MEDICATIONS  (STANDING):  alteplase for catheter clearance 2 milliGRAM(s) Catheter once  AQUAPHOR (petrolatum Ointment) 1 Application(s) Topical two times a day  cefepime   IVPB 2000 milliGRAM(s) IV Intermittent every 8 hours  influenza   Vaccine 0.5 milliLiter(s) IntraMuscular once  levETIRAcetam 500 milliGRAM(s) Oral two times a day  micafungin IVPB 100 milliGRAM(s) IV Intermittent daily  multivitamin 1 Tablet(s) Oral daily  pantoprazole    Tablet 40 milliGRAM(s) Oral before breakfast  sodium chloride 0.9% lock flush 20 milliLiter(s) IV Push once  sodium chloride 0.9%. 1000 milliLiter(s) (50 mL/Hr) IV Continuous <Continuous>  vancomycin  IVPB 1000 milliGRAM(s) IV Intermittent every 12 hours    MEDICATIONS  (PRN):  acetaminophen   Tablet .. 650 milliGRAM(s) Oral every 6 hours PRN Mild Pain (1 - 3), Moderate Pain (4 - 6)  acetaminophen   Tablet .. 650 milliGRAM(s) Oral every 6 hours PRN Temp greater or equal to 38C (100.4F)  HYDROmorphone  Injectable 1.5 milliGRAM(s) IV Push every 4 hours PRN Severe Pain (7 - 10)  HYDROmorphone  Injectable 0.5 milliGRAM(s) IV Push every 4 hours PRN Moderate Pain (4 - 6)  metoclopramide Injectable 10 milliGRAM(s) IV Push every 6 hours PRN nausea  sodium chloride 0.9% lock flush 10 milliLiter(s) IV Push every 1 hour PRN After each medication administration  sodium chloride 0.9% lock flush 10 milliLiter(s) IV Push every 12 hours PRN Lumen of catheter NOT used    Pertinent Labs:  @ 06:48: Na 140, BUN 12, Cr 0.63, BG 81, K+ 3.9, Phos 3.5, Mg 2.2, Alk Phos 111, ALT/SGPT 16, AST/SGOT 17, HbA1c --    Finger Sticks:      Skin per nursing documentation:   Edema:    Estimated Needs:   [ ] no change since previous assessment  [ ] recalculated:     Previous Nutrition Diagnosis:   Nutrition Diagnosis is:    New Nutrition Diagnosis:  Related to:    As evidenced by:      Interventions:     Recommend  1)    Monitoring and Evaluation:     Continue to monitor Nutritional intake, Tolerance to diet prescription, weights, labs, skin integrity    RD remains available upon request and will follow up per protocol Nutrition Follow Up Note    Hospital course as per chart 57 yo. female with a PMH of HTN, obesity, PH (+) ALL S/P chemotherapy, autologous PBSCT 12/2016. Admitted for headache, relapse of ALL complicated by SDH, gram positive bacteremia, Upper GIB, thrombocytopenia and anemia 2/2 to disease or chemotherapy. Patient is currently undergoing cycle 2 of inotazamab.     Source: Patient, medical record, previous RD note    Diet : Regular, ensure enlive 2x daily    Patient reports: Improved appetite and PO intake, finishing 75% of her meals. For breakfast patient has been usually eating hard boiled eggs, cereal and phan. For lunch/dinner she usually orders meat with baked potato. Patient reports drinking ensure enlive daily and consumes milkshakes from shake it up program when offered. Reports feeling nauseous from odors of certain foods. Provided patient foods to try to avoid such as hot steak to prevent nausea feeling. No chewing or swallowing difficulties reported. Last BM was (11/13) as per flow sheet.     Daily Weight : (11/14) 247.3 lbs. As per previous RD note patient wt. was (10/30) 246.2 lbs. and (10/11) 251.1 lbs    Pertinent Medications: MEDICATIONS  (STANDING):  alteplase for catheter clearance 2 milliGRAM(s) Catheter once  AQUAPHOR (petrolatum Ointment) 1 Application(s) Topical two times a day  cefepime   IVPB 2000 milliGRAM(s) IV Intermittent every 8 hours  influenza   Vaccine 0.5 milliLiter(s) IntraMuscular once  levETIRAcetam 500 milliGRAM(s) Oral two times a day  micafungin IVPB 100 milliGRAM(s) IV Intermittent daily  multivitamin 1 Tablet(s) Oral daily  pantoprazole    Tablet 40 milliGRAM(s) Oral before breakfast  sodium chloride 0.9% lock flush 20 milliLiter(s) IV Push once  sodium chloride 0.9%. 1000 milliLiter(s) (50 mL/Hr) IV Continuous <Continuous>  vancomycin  IVPB 1000 milliGRAM(s) IV Intermittent every 12 hours    MEDICATIONS  (PRN):  acetaminophen   Tablet .. 650 milliGRAM(s) Oral every 6 hours PRN Mild Pain (1 - 3), Moderate Pain (4 - 6)  acetaminophen   Tablet .. 650 milliGRAM(s) Oral every 6 hours PRN Temp greater or equal to 38C (100.4F)  HYDROmorphone  Injectable 1.5 milliGRAM(s) IV Push every 4 hours PRN Severe Pain (7 - 10)  HYDROmorphone  Injectable 0.5 milliGRAM(s) IV Push every 4 hours PRN Moderate Pain (4 - 6)  metoclopramide Injectable 10 milliGRAM(s) IV Push every 6 hours PRN nausea  sodium chloride 0.9% lock flush 10 milliLiter(s) IV Push every 1 hour PRN After each medication administration  sodium chloride 0.9% lock flush 10 milliLiter(s) IV Push every 12 hours PRN Lumen of catheter NOT used    Pertinent Labs: 11-14 @ 06:48: Na 140, BUN 12, Cr 0.63, BG 81, K+ 3.9, Phos 3.5, Mg 2.2, Alk Phos 111, ALT/SGPT 16, AST/SGOT 17, HbA1c --    Skin per nursing documentation: no pressure ulcers noted as per documentation  Edema: no edema noted as per documentation    Estimated Needs: [x] no change since previous assessment    Previous Nutrition Diagnosis: inadequate oral intake  Nutrition Diagnosis is: ongoing - patient appetite and PO intake continues to improve. Addressed by supplementation with ensure enlive 2x daily. Educating on small frequent meals and importance of protein in the diet.     Interventions:     1. Recommend continue regular diet  2. Recommend continue supplementation with ensure enlive 2x daily  3. Encourage PO intake of meals  4. Obtained food preferences    Monitoring and Evaluation: Continue to monitor Nutritional intake, Tolerance to diet prescription, weights, labs, skin integrity    RD remains available upon request and will follow up per protocol Nutrition Follow Up Note    Hospital course as per chart 59 yo. female with a PMH of HTN, obesity, PH (+) ALL S/P chemotherapy, autologous PBSCT 12/2016. Admitted for headache, relapse of ALL complicated by SDH, gram positive bacteremia, Upper GIB, thrombocytopenia and anemia 2/2 to disease or chemotherapy. Patient is currently undergoing cycle 2 of inotazamab.     Source: Patient, medical record, previous RD note    Diet : Regular, ensure enlive 2x daily    Patient reports: Improved appetite and PO intake, finishing 75% of her meals. For breakfast patient has been usually eating hard boiled eggs, cereal and phan. For lunch/dinner she usually orders meat with baked potato. Patient reports drinking two ensure enlive daily and consumes milkshakes from shake it up program when offered. Reports feeling nauseous from odors of certain foods. Provided patient foods to try to avoid such as hot steak to prevent nausea feeling. No chewing or swallowing difficulties reported. Last BM was (11/13) as per flow sheet.     Daily Weight : (11/14) 247.3 lbs. As per previous RD note patient wt. was (10/30) 246.2 lbs. and (10/11) 251.1 lbs - wt. slightly decreased from admission, but remains stable.    Pertinent Medications: MEDICATIONS  (STANDING):  alteplase for catheter clearance 2 milliGRAM(s) Catheter once  AQUAPHOR (petrolatum Ointment) 1 Application(s) Topical two times a day  cefepime   IVPB 2000 milliGRAM(s) IV Intermittent every 8 hours  influenza   Vaccine 0.5 milliLiter(s) IntraMuscular once  levETIRAcetam 500 milliGRAM(s) Oral two times a day  micafungin IVPB 100 milliGRAM(s) IV Intermittent daily  multivitamin 1 Tablet(s) Oral daily  pantoprazole    Tablet 40 milliGRAM(s) Oral before breakfast  sodium chloride 0.9% lock flush 20 milliLiter(s) IV Push once  sodium chloride 0.9%. 1000 milliLiter(s) (50 mL/Hr) IV Continuous <Continuous>  vancomycin  IVPB 1000 milliGRAM(s) IV Intermittent every 12 hours    MEDICATIONS  (PRN):  acetaminophen   Tablet .. 650 milliGRAM(s) Oral every 6 hours PRN Mild Pain (1 - 3), Moderate Pain (4 - 6)  acetaminophen   Tablet .. 650 milliGRAM(s) Oral every 6 hours PRN Temp greater or equal to 38C (100.4F)  HYDROmorphone  Injectable 1.5 milliGRAM(s) IV Push every 4 hours PRN Severe Pain (7 - 10)  HYDROmorphone  Injectable 0.5 milliGRAM(s) IV Push every 4 hours PRN Moderate Pain (4 - 6)  metoclopramide Injectable 10 milliGRAM(s) IV Push every 6 hours PRN nausea  sodium chloride 0.9% lock flush 10 milliLiter(s) IV Push every 1 hour PRN After each medication administration  sodium chloride 0.9% lock flush 10 milliLiter(s) IV Push every 12 hours PRN Lumen of catheter NOT used    Pertinent Labs: 11-14 @ 06:48: Na 140, BUN 12, Cr 0.63, BG 81, K+ 3.9, Phos 3.5, Mg 2.2, Alk Phos 111, ALT/SGPT 16, AST/SGOT 17, HbA1c --    Skin per nursing documentation: no pressure ulcers noted as per documentation  Edema: no edema noted as per documentation    Estimated Needs: [x] no change since previous assessment    Previous Nutrition Diagnosis: inadequate oral intake  Nutrition Diagnosis is: ongoing - patient appetite and PO intake continues to improve. Addressed by supplementation with ensure enlive 2x daily. Educating on small frequent meals and importance of protein in the diet.     Interventions:     1. Recommend continue regular diet  2. Recommend continue supplementation with ensure enlive 2x daily  3. Encourage PO intake of meals  4. Obtained food preferences    Monitoring and Evaluation: Continue to monitor Nutritional intake, Tolerance to diet prescription, weights, labs, skin integrity    RD remains available upon request and will follow up per protocol

## 2018-11-14 NOTE — PROGRESS NOTE ADULT - SUBJECTIVE AND OBJECTIVE BOX
Diagnosis: Relapsed ALL Ph (+)     Protocol/Chemo Regimen:  Cycle 2 Inotuzamab Days 1, 8 and 15    Day: 9    Pt endorsed:  intermittent headache controlled with pain medications     Review of Systems: Denies nausea, vomiting, diarrhea, chest pain, SOB     Pain scale: denies    Diet: Regular    Allergies    No Known Drug Allergies  shellfish (Nausea; Vomiting)    Intolerances        ANTIMICROBIALS  cefepime   IVPB 2000 milliGRAM(s) IV Intermittent every 8 hours  micafungin IVPB 100 milliGRAM(s) IV Intermittent daily  vancomycin  IVPB 1000 milliGRAM(s) IV Intermittent every 12 hours      HEME/ONC MEDICATIONS  alteplase for catheter clearance 2 milliGRAM(s) Catheter once      STANDING MEDICATIONS  AQUAPHOR (petrolatum Ointment) 1 Application(s) Topical two times a day  influenza   Vaccine 0.5 milliLiter(s) IntraMuscular once  levETIRAcetam 500 milliGRAM(s) Oral two times a day  multivitamin 1 Tablet(s) Oral daily  pantoprazole    Tablet 40 milliGRAM(s) Oral before breakfast  sodium chloride 0.9% lock flush 20 milliLiter(s) IV Push once  sodium chloride 0.9%. 1000 milliLiter(s) IV Continuous <Continuous>      PRN MEDICATIONS  acetaminophen   Tablet .. 650 milliGRAM(s) Oral every 6 hours PRN  acetaminophen   Tablet .. 650 milliGRAM(s) Oral every 6 hours PRN  HYDROmorphone  Injectable 1.5 milliGRAM(s) IV Push every 4 hours PRN  HYDROmorphone  Injectable 0.5 milliGRAM(s) IV Push every 4 hours PRN  metoclopramide Injectable 10 milliGRAM(s) IV Push every 6 hours PRN  sodium chloride 0.9% lock flush 10 milliLiter(s) IV Push every 1 hour PRN  sodium chloride 0.9% lock flush 10 milliLiter(s) IV Push every 12 hours PRN        Vital Signs Last 24 Hrs  T(C): 36.8 (14 Nov 2018 05:27), Max: 36.8 (13 Nov 2018 21:44)  T(F): 98.3 (14 Nov 2018 05:27), Max: 98.3 (14 Nov 2018 00:45)  HR: 66 (14 Nov 2018 05:27) (66 - 79)  BP: 138/82 (14 Nov 2018 05:27) (117/75 - 139/88)  BP(mean): --  RR: 18 (14 Nov 2018 05:27) (18 - 18)  SpO2: 100% (14 Nov 2018 05:27) (98% - 100%)    PHYSICAL EXAM  General: NAD  HEENT: PERRLA, EOMI   Neck: supple  CV: (+) S1/S2 RRR  Lungs: clear to auscultation, no wheezes or rales  Abdomen: soft, non-tender, non-distended (+) BS  Ext: no clubbing, cyanosis or edema  Skin: no rashes and no petechiae  Neuro: alert and oriented X 3, no focal deficits  Central Line: RUE PICC line C/D/I     RECENT CULTURES:    Culture - Blood in AM (11.05.18 @ 08:42)    Specimen Source: .Blood Blood-Catheter    Culture Results:   No growth at 5 days.    Culture - Blood in AM (11.05.18 @ 08:42)    Specimen Source: .Blood Blood-Catheter    Culture Results:   No growth at 5 days.            LABS:                        8.6    6.9   )-----------( 46       ( 14 Nov 2018 06:48 )             24.9         Mean Cell Volume : 97.1 fl  Mean Cell Hemoglobin : 33.7 pg  Mean Cell Hemoglobin Concentration : 34.7 gm/dL  Auto Neutrophil # : 3.6 K/uL  Auto Lymphocyte # : 1.7 K/uL  Auto Monocyte # : 1.4 K/uL  Auto Eosinophil # : 0.1 K/uL  Auto Basophil # : 0.1 K/uL  Auto Neutrophil % : 52.6 %  Auto Lymphocyte % : 25.2 %  Auto Monocyte % : 20.1 %  Auto Eosinophil % : 1.4 %  Auto Basophil % : 0.7 %      11-14    140  |  105  |  12  ----------------------------<  81  3.9   |  26  |  0.63    Ca    10.8<H>      14 Nov 2018 06:48  Phos  3.5     11-14  Mg     2.2     11-14    TPro  6.7  /  Alb  3.8  /  TBili  0.7  /  DBili  x   /  AST  17  /  ALT  16  /  AlkPhos  111  11-14      Mg 2.2  Phos 3.5            Uric Acid 2.7        RADIOLOGY & ADDITIONAL STUDIES:

## 2018-11-15 LAB
ALBUMIN SERPL ELPH-MCNC: 3.8 G/DL — SIGNIFICANT CHANGE UP (ref 3.3–5)
ALP SERPL-CCNC: 111 U/L — SIGNIFICANT CHANGE UP (ref 40–120)
ALT FLD-CCNC: 14 U/L — SIGNIFICANT CHANGE UP (ref 10–45)
ANION GAP SERPL CALC-SCNC: 13 MMOL/L — SIGNIFICANT CHANGE UP (ref 5–17)
APTT BLD: 24.2 SEC — LOW (ref 27.5–36.3)
AST SERPL-CCNC: 16 U/L — SIGNIFICANT CHANGE UP (ref 10–40)
BASOPHILS # BLD AUTO: 0.1 K/UL — SIGNIFICANT CHANGE UP (ref 0–0.2)
BILIRUB SERPL-MCNC: 0.6 MG/DL — SIGNIFICANT CHANGE UP (ref 0.2–1.2)
BUN SERPL-MCNC: 15 MG/DL — SIGNIFICANT CHANGE UP (ref 7–23)
CALCIUM SERPL-MCNC: 10.4 MG/DL — SIGNIFICANT CHANGE UP (ref 8.4–10.5)
CALCIUM SERPL-MCNC: 10.8 MG/DL — HIGH (ref 8.4–10.5)
CHLORIDE SERPL-SCNC: 103 MMOL/L — SIGNIFICANT CHANGE UP (ref 96–108)
CO2 SERPL-SCNC: 24 MMOL/L — SIGNIFICANT CHANGE UP (ref 22–31)
CREAT SERPL-MCNC: 0.68 MG/DL — SIGNIFICANT CHANGE UP (ref 0.5–1.3)
EOSINOPHIL # BLD AUTO: 0.3 K/UL — SIGNIFICANT CHANGE UP (ref 0–0.5)
EOSINOPHIL NFR BLD AUTO: 4 % — SIGNIFICANT CHANGE UP (ref 0–6)
FIBRINOGEN PPP-MCNC: 542 MG/DL — HIGH (ref 350–510)
GLUCOSE SERPL-MCNC: 99 MG/DL — SIGNIFICANT CHANGE UP (ref 70–99)
HCT VFR BLD CALC: 28.9 % — LOW (ref 34.5–45)
HGB BLD-MCNC: 9.9 G/DL — LOW (ref 11.5–15.5)
INR BLD: 0.98 RATIO — SIGNIFICANT CHANGE UP (ref 0.88–1.16)
LDH SERPL L TO P-CCNC: 260 U/L — HIGH (ref 50–242)
LYMPHOCYTES # BLD AUTO: 2.1 K/UL — SIGNIFICANT CHANGE UP (ref 1–3.3)
LYMPHOCYTES # BLD AUTO: 27 % — SIGNIFICANT CHANGE UP (ref 13–44)
MAGNESIUM SERPL-MCNC: 2.1 MG/DL — SIGNIFICANT CHANGE UP (ref 1.6–2.6)
MCHC RBC-ENTMCNC: 33.5 PG — SIGNIFICANT CHANGE UP (ref 27–34)
MCHC RBC-ENTMCNC: 34.3 GM/DL — SIGNIFICANT CHANGE UP (ref 32–36)
MCV RBC AUTO: 97.6 FL — SIGNIFICANT CHANGE UP (ref 80–100)
MONOCYTES # BLD AUTO: 1.7 K/UL — HIGH (ref 0–0.9)
MONOCYTES NFR BLD AUTO: 13 % — SIGNIFICANT CHANGE UP (ref 2–14)
NEUTROPHILS # BLD AUTO: 4.1 K/UL — SIGNIFICANT CHANGE UP (ref 1.8–7.4)
NEUTROPHILS NFR BLD AUTO: 56 % — SIGNIFICANT CHANGE UP (ref 43–77)
PHOSPHATE SERPL-MCNC: 4.1 MG/DL — SIGNIFICANT CHANGE UP (ref 2.5–4.5)
PLATELET # BLD AUTO: 52 K/UL — LOW (ref 150–400)
POTASSIUM SERPL-MCNC: 3.9 MMOL/L — SIGNIFICANT CHANGE UP (ref 3.5–5.3)
POTASSIUM SERPL-SCNC: 3.9 MMOL/L — SIGNIFICANT CHANGE UP (ref 3.5–5.3)
PROT SERPL-MCNC: 6.6 G/DL — SIGNIFICANT CHANGE UP (ref 6–8.3)
PROTHROM AB SERPL-ACNC: 11.3 SEC — SIGNIFICANT CHANGE UP (ref 10–12.9)
PTH-INTACT FLD-MCNC: 94 PG/ML — HIGH (ref 15–65)
RBC # BLD: 2.96 M/UL — LOW (ref 3.8–5.2)
RBC # FLD: 21 % — HIGH (ref 10.3–14.5)
SODIUM SERPL-SCNC: 140 MMOL/L — SIGNIFICANT CHANGE UP (ref 135–145)
URATE SERPL-MCNC: 3.4 MG/DL — SIGNIFICANT CHANGE UP (ref 2.5–7)
VIT D25+D1,25 OH+D1,25 PNL SERPL-MCNC: 62 PG/ML — SIGNIFICANT CHANGE UP (ref 19.9–79.3)
WBC # BLD: 8.4 K/UL — SIGNIFICANT CHANGE UP (ref 3.8–10.5)
WBC # FLD AUTO: 8.4 K/UL — SIGNIFICANT CHANGE UP (ref 3.8–10.5)

## 2018-11-15 PROCEDURE — 99232 SBSQ HOSP IP/OBS MODERATE 35: CPT | Mod: GC

## 2018-11-15 PROCEDURE — 70450 CT HEAD/BRAIN W/O DYE: CPT | Mod: 26

## 2018-11-15 RX ORDER — HYDROMORPHONE HYDROCHLORIDE 2 MG/ML
2 INJECTION INTRAMUSCULAR; INTRAVENOUS; SUBCUTANEOUS EVERY 4 HOURS
Qty: 0 | Refills: 0 | Status: DISCONTINUED | OUTPATIENT
Start: 2018-11-15 | End: 2018-11-15

## 2018-11-15 RX ORDER — HYDROMORPHONE HYDROCHLORIDE 2 MG/ML
1 INJECTION INTRAMUSCULAR; INTRAVENOUS; SUBCUTANEOUS ONCE
Qty: 0 | Refills: 0 | Status: DISCONTINUED | OUTPATIENT
Start: 2018-11-15 | End: 2018-11-15

## 2018-11-15 RX ORDER — HYDROMORPHONE HYDROCHLORIDE 2 MG/ML
1.5 INJECTION INTRAMUSCULAR; INTRAVENOUS; SUBCUTANEOUS EVERY 4 HOURS
Qty: 0 | Refills: 0 | Status: DISCONTINUED | OUTPATIENT
Start: 2018-11-15 | End: 2018-11-19

## 2018-11-15 RX ORDER — HYDROMORPHONE HYDROCHLORIDE 2 MG/ML
0.5 INJECTION INTRAMUSCULAR; INTRAVENOUS; SUBCUTANEOUS EVERY 4 HOURS
Qty: 0 | Refills: 0 | Status: DISCONTINUED | OUTPATIENT
Start: 2018-11-15 | End: 2018-11-19

## 2018-11-15 RX ADMIN — Medication 1 APPLICATION(S): at 17:34

## 2018-11-15 RX ADMIN — PANTOPRAZOLE SODIUM 40 MILLIGRAM(S): 20 TABLET, DELAYED RELEASE ORAL at 05:06

## 2018-11-15 RX ADMIN — SODIUM CHLORIDE 50 MILLILITER(S): 9 INJECTION INTRAMUSCULAR; INTRAVENOUS; SUBCUTANEOUS at 17:35

## 2018-11-15 RX ADMIN — HYDROMORPHONE HYDROCHLORIDE 1 MILLIGRAM(S): 2 INJECTION INTRAMUSCULAR; INTRAVENOUS; SUBCUTANEOUS at 16:00

## 2018-11-15 RX ADMIN — HYDROMORPHONE HYDROCHLORIDE 1.5 MILLIGRAM(S): 2 INJECTION INTRAMUSCULAR; INTRAVENOUS; SUBCUTANEOUS at 05:06

## 2018-11-15 RX ADMIN — HYDROMORPHONE HYDROCHLORIDE 1.5 MILLIGRAM(S): 2 INJECTION INTRAMUSCULAR; INTRAVENOUS; SUBCUTANEOUS at 20:06

## 2018-11-15 RX ADMIN — HYDROMORPHONE HYDROCHLORIDE 1.5 MILLIGRAM(S): 2 INJECTION INTRAMUSCULAR; INTRAVENOUS; SUBCUTANEOUS at 19:51

## 2018-11-15 RX ADMIN — HYDROMORPHONE HYDROCHLORIDE 1.5 MILLIGRAM(S): 2 INJECTION INTRAMUSCULAR; INTRAVENOUS; SUBCUTANEOUS at 09:12

## 2018-11-15 RX ADMIN — HYDROMORPHONE HYDROCHLORIDE 1.5 MILLIGRAM(S): 2 INJECTION INTRAMUSCULAR; INTRAVENOUS; SUBCUTANEOUS at 01:34

## 2018-11-15 RX ADMIN — Medication 250 MILLIGRAM(S): at 11:34

## 2018-11-15 RX ADMIN — HYDROMORPHONE HYDROCHLORIDE 1.5 MILLIGRAM(S): 2 INJECTION INTRAMUSCULAR; INTRAVENOUS; SUBCUTANEOUS at 05:36

## 2018-11-15 RX ADMIN — HYDROMORPHONE HYDROCHLORIDE 1.5 MILLIGRAM(S): 2 INJECTION INTRAMUSCULAR; INTRAVENOUS; SUBCUTANEOUS at 00:56

## 2018-11-15 RX ADMIN — HYDROMORPHONE HYDROCHLORIDE 1.5 MILLIGRAM(S): 2 INJECTION INTRAMUSCULAR; INTRAVENOUS; SUBCUTANEOUS at 09:30

## 2018-11-15 RX ADMIN — LEVETIRACETAM 500 MILLIGRAM(S): 250 TABLET, FILM COATED ORAL at 05:06

## 2018-11-15 RX ADMIN — Medication 1 APPLICATION(S): at 05:08

## 2018-11-15 RX ADMIN — CEFEPIME 100 MILLIGRAM(S): 1 INJECTION, POWDER, FOR SOLUTION INTRAMUSCULAR; INTRAVENOUS at 05:06

## 2018-11-15 RX ADMIN — HYDROMORPHONE HYDROCHLORIDE 2 MILLIGRAM(S): 2 INJECTION INTRAMUSCULAR; INTRAVENOUS; SUBCUTANEOUS at 14:30

## 2018-11-15 RX ADMIN — Medication 1 TABLET(S): at 11:34

## 2018-11-15 RX ADMIN — HYDROMORPHONE HYDROCHLORIDE 1 MILLIGRAM(S): 2 INJECTION INTRAMUSCULAR; INTRAVENOUS; SUBCUTANEOUS at 15:43

## 2018-11-15 RX ADMIN — HYDROMORPHONE HYDROCHLORIDE 2 MILLIGRAM(S): 2 INJECTION INTRAMUSCULAR; INTRAVENOUS; SUBCUTANEOUS at 14:12

## 2018-11-15 RX ADMIN — LEVETIRACETAM 500 MILLIGRAM(S): 250 TABLET, FILM COATED ORAL at 17:34

## 2018-11-15 NOTE — PROGRESS NOTE ADULT - ATTENDING COMMENTS
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  s/p Masha-Auto on 12/16/16. Pt was on Sprycel maintenance. 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 Has completed first cycle Inotuzumab and cycle 2 Inotuzumab started 11/6. BCR/ABL mutation analysis pending.  - Feels better. Headaches less, but still present. CT head(11/3)- SDH has nearly resolved.   -GI bleed has resolved, Continue po Protonix.  -Now afebrile. Had been febrile on 11/2. CT c/a/p 10/16- Scattered and patchy ground glass opacities in the right upper and lower lobes, possibly infection.  Heterogeneous thickening of the endometrial cavity.  Antibiotics adjusted to Cefepime/Mycamine per ID service. BCx (11/2) from PICC growing coag negative staph, she was started on Vanco IV, plan for 14 days - finish 11/16, ID followup. repeat Cx's from PICC and peripherally NGTD  - goal plt ct >80K, but platelets are refractory to transfusion- receiving transfusion of 1/2 units over 3 hours twice a day, will try to decrease frequency of transfusions since no improvement in counts and monitor plts closely. Keeping hb >7.   - OOB, ambulation.  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  s/p Masha-Auto on 12/16/16. Pt was on Sprycel maintenance. 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 Has completed first cycle Inotuzumab and cycle 2 Inotuzumab started 11/6. BCR/ABL mutation analysis pending.  -Feels better. Headaches less, but still present. CT head(11/3)- SDH has nearly resolved. Repeat HCT today.  -change IV pain meds to po  -GI bleed has resolved, Continue po Protonix.  -Now afebrile. Had been febrile on 11/2. CT c/a/p 10/16- Scattered and patchy ground glass opacities in the right upper and lower lobes, possibly infection.  Heterogeneous thickening of the endometrial cavity. BCx (11/2) from PICC growing coag negative staph, she was started on Vanco IV, plan for 14 days - finish 11/16, ID followup. repeat Cx's from PICC and peripherally NGTD. Will d/c Cefepime and mycamine now that she is not neutropenic  - goal plt ct >80K, but platelets are refractory to transfusion- receiving transfusion of 1/2 units over 3 hours twice a day, will try to decrease frequency of transfusions since no improvement in counts and monitor plts closely. Keeping hb >7.   - OOB, ambulation.  - pretransplant testing done

## 2018-11-15 NOTE — PROGRESS NOTE ADULT - PROBLEM SELECTOR PLAN 3
10/4 CT Head revealed small bilateral acute on chronic convexity subdural hematomas. Repeat CT Head on 10/5, 10/6 and 10/11 is stable with 10/16 results showing SDH decreasing in size. Repeat CT Head 10/23 with some small area of bleeding into previous collection.   Continue Keppra 500mg BID for seizure ppx   Pain management   Neurosurgery following, no intervention at this time.   Repeat Head CT for worsening headaches on 11/3 and 11/15  c/w nearly resolved subdural hematomas

## 2018-11-15 NOTE — PROGRESS NOTE ADULT - PROBLEM SELECTOR PLAN 1
10/5 Peripheral flow confirms relapse with BCR/ABL rearrangement in 79.5% of cells  s/p Cycle 1 Inotuzamab (Day 1, 8 and 15).  now on cycle Cycle 2 started 11/6.  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

## 2018-11-15 NOTE — PROGRESS NOTE ADULT - SUBJECTIVE AND OBJECTIVE BOX
Diagnosis: Relapsed ALL Ph (+)     Protocol/Chemo Regimen:  Cycle 2 Inotuzamab Days 1, 8 and 15    Day: 10    Pt endorsed:  intermittent headache controlled with pain medications     Review of Systems: Denies nausea, vomiting, diarrhea, chest pain, SOB     Pain scale: denies    Diet: Regular    Allergies    No Known Drug Allergies  shellfish (Nausea; Vomiting)    Intolerances        ANTIMICROBIALS  vancomycin  IVPB 1000 milliGRAM(s) IV Intermittent every 12 hours      HEME/ONC MEDICATIONS  alteplase for catheter clearance 2 milliGRAM(s) Catheter once      STANDING MEDICATIONS  AQUAPHOR (petrolatum Ointment) 1 Application(s) Topical two times a day  influenza   Vaccine 0.5 milliLiter(s) IntraMuscular once  levETIRAcetam 500 milliGRAM(s) Oral two times a day  multivitamin 1 Tablet(s) Oral daily  pantoprazole    Tablet 40 milliGRAM(s) Oral before breakfast  sodium chloride 0.9% lock flush 20 milliLiter(s) IV Push once  sodium chloride 0.9%. 1000 milliLiter(s) IV Continuous <Continuous>      PRN MEDICATIONS  acetaminophen   Tablet .. 650 milliGRAM(s) Oral every 6 hours PRN  acetaminophen   Tablet .. 650 milliGRAM(s) Oral every 6 hours PRN  HYDROmorphone   Tablet 2 milliGRAM(s) Oral every 4 hours PRN  metoclopramide Injectable 10 milliGRAM(s) IV Push every 6 hours PRN  sodium chloride 0.9% lock flush 10 milliLiter(s) IV Push every 1 hour PRN  sodium chloride 0.9% lock flush 10 milliLiter(s) IV Push every 12 hours PRN        Vital Signs Last 24 Hrs  T(C): 36.8 (15 Nov 2018 10:10), Max: 37 (14 Nov 2018 17:28)  T(F): 98.2 (15 Nov 2018 10:10), Max: 98.6 (14 Nov 2018 17:28)  HR: 94 (15 Nov 2018 10:10) (74 - 94)  BP: 158/96 (15 Nov 2018 10:10) (125/76 - 165/97)  BP(mean): --  RR: 18 (15 Nov 2018 10:10) (18 - 18)  SpO2: 97% (15 Nov 2018 10:10) (97% - 100%)    PHYSICAL EXAM  General: NAD  HEENT:  clear oropharynx, anicteric sclera, Ommaya in place right side of head  Neck: supple  CV: (+) S1/S2 RRR  Lungs: clear to auscultation, no wheezes or rales  Abdomen: soft, non-tender, non-distended (+) BS x 4Q  Ext: no edema  Skin: no rashes  Neuro: alert and oriented X 3  Central Line: PICC CDI           LABS:                        9.9    8.4   )-----------( 52       ( 15 Nov 2018 07:01 )             28.9         Mean Cell Volume : 97.6 fl  Mean Cell Hemoglobin : 33.5 pg  Mean Cell Hemoglobin Concentration : 34.3 gm/dL  Auto Neutrophil # : 4.1 K/uL  Auto Lymphocyte # : 2.1 K/uL  Auto Monocyte # : 1.7 K/uL  Auto Eosinophil # : 0.3 K/uL  Auto Basophil # : 0.1 K/uL  Auto Neutrophil % : 56.0 %  Auto Lymphocyte % : 27.0 %  Auto Monocyte % : 13.0 %  Auto Eosinophil % : 4.0 %  Auto Basophil % : x      11-15    140  |  103  |  15  ----------------------------<  99  3.9   |  24  |  0.68    Ca    10.8<H>      15 Nov 2018 06:48  Phos  4.1     11-15  Mg     2.1     11-15    TPro  6.6  /  Alb  3.8  /  TBili  0.6  /  DBili  x   /  AST  16  /  ALT  14  /  AlkPhos  111  11-15      Mg 2.1  Phos 4.1      PT/INR - ( 15 Nov 2018 06:55 )   PT: 11.3 sec;   INR: 0.98 ratio         PTT - ( 15 Nov 2018 06:55 )  PTT:24.2 sec      Uric Acid 3.4        RECENT CULTURES:      RADIOLOGY & ADDITIONAL STUDIES:     CT Head No Cont (11.15.18 @ 13:08) >    IMPRESSION: Slightly disproportionate cerebellar volume loss. Right   frontal Ommaya reservoir. No change in the ventricle size compared with   the prior. No other significant pathology. No change from 11/3/2018

## 2018-11-15 NOTE — PROGRESS NOTE ADULT - PROBLEM SELECTOR PLAN 2
Patient is not neutropenic, afebrile   Discontinued  Mycamine for ppx (No azoles with Inotuzumab), Cefepime on 11/15   Vancomycin added for BC gram + cocci in clusters on 11/2  complete 14 day course IV Vanco through 11/16

## 2018-11-16 DIAGNOSIS — Z51.5 ENCOUNTER FOR PALLIATIVE CARE: ICD-10-CM

## 2018-11-16 DIAGNOSIS — R52 PAIN, UNSPECIFIED: ICD-10-CM

## 2018-11-16 DIAGNOSIS — C91.01 ACUTE LYMPHOBLASTIC LEUKEMIA, IN REMISSION: ICD-10-CM

## 2018-11-16 LAB
ALBUMIN SERPL ELPH-MCNC: 4 G/DL — SIGNIFICANT CHANGE UP (ref 3.3–5)
ALP SERPL-CCNC: 112 U/L — SIGNIFICANT CHANGE UP (ref 40–120)
ALT FLD-CCNC: 15 U/L — SIGNIFICANT CHANGE UP (ref 10–45)
ANION GAP SERPL CALC-SCNC: 12 MMOL/L — SIGNIFICANT CHANGE UP (ref 5–17)
AST SERPL-CCNC: 17 U/L — SIGNIFICANT CHANGE UP (ref 10–40)
BASOPHILS # BLD AUTO: 0 K/UL — SIGNIFICANT CHANGE UP (ref 0–0.2)
BASOPHILS NFR BLD AUTO: 0.6 % — SIGNIFICANT CHANGE UP (ref 0–2)
BILIRUB SERPL-MCNC: 0.7 MG/DL — SIGNIFICANT CHANGE UP (ref 0.2–1.2)
BLD GP AB SCN SERPL QL: POSITIVE — SIGNIFICANT CHANGE UP
BUN SERPL-MCNC: 12 MG/DL — SIGNIFICANT CHANGE UP (ref 7–23)
CALCIUM SERPL-MCNC: 10.8 MG/DL — HIGH (ref 8.4–10.5)
CHLORIDE SERPL-SCNC: 102 MMOL/L — SIGNIFICANT CHANGE UP (ref 96–108)
CO2 SERPL-SCNC: 27 MMOL/L — SIGNIFICANT CHANGE UP (ref 22–31)
CREAT SERPL-MCNC: 0.68 MG/DL — SIGNIFICANT CHANGE UP (ref 0.5–1.3)
EOSINOPHIL # BLD AUTO: 0.4 K/UL — SIGNIFICANT CHANGE UP (ref 0–0.5)
EOSINOPHIL NFR BLD AUTO: 5.1 % — SIGNIFICANT CHANGE UP (ref 0–6)
GLUCOSE SERPL-MCNC: 93 MG/DL — SIGNIFICANT CHANGE UP (ref 70–99)
HCT VFR BLD CALC: 27.6 % — LOW (ref 34.5–45)
HGB BLD-MCNC: 9.2 G/DL — LOW (ref 11.5–15.5)
LDH SERPL L TO P-CCNC: 254 U/L — HIGH (ref 50–242)
LYMPHOCYTES # BLD AUTO: 2 K/UL — SIGNIFICANT CHANGE UP (ref 1–3.3)
LYMPHOCYTES # BLD AUTO: 28.9 % — SIGNIFICANT CHANGE UP (ref 13–44)
MAGNESIUM SERPL-MCNC: 2.1 MG/DL — SIGNIFICANT CHANGE UP (ref 1.6–2.6)
MCHC RBC-ENTMCNC: 32.2 PG — SIGNIFICANT CHANGE UP (ref 27–34)
MCHC RBC-ENTMCNC: 33.3 GM/DL — SIGNIFICANT CHANGE UP (ref 32–36)
MCV RBC AUTO: 96.9 FL — SIGNIFICANT CHANGE UP (ref 80–100)
MONOCYTES # BLD AUTO: 1.2 K/UL — HIGH (ref 0–0.9)
MONOCYTES NFR BLD AUTO: 17.2 % — HIGH (ref 2–14)
NEUTROPHILS # BLD AUTO: 3.3 K/UL — SIGNIFICANT CHANGE UP (ref 1.8–7.4)
NEUTROPHILS NFR BLD AUTO: 48.1 % — SIGNIFICANT CHANGE UP (ref 43–77)
PHOSPHATE SERPL-MCNC: 4.1 MG/DL — SIGNIFICANT CHANGE UP (ref 2.5–4.5)
PLATELET # BLD AUTO: 68 K/UL — LOW (ref 150–400)
POTASSIUM SERPL-MCNC: 4.2 MMOL/L — SIGNIFICANT CHANGE UP (ref 3.5–5.3)
POTASSIUM SERPL-SCNC: 4.2 MMOL/L — SIGNIFICANT CHANGE UP (ref 3.5–5.3)
PROT SERPL-MCNC: 6.9 G/DL — SIGNIFICANT CHANGE UP (ref 6–8.3)
RBC # BLD: 2.85 M/UL — LOW (ref 3.8–5.2)
RBC # FLD: 20.5 % — HIGH (ref 10.3–14.5)
RH IG SCN BLD-IMP: POSITIVE — SIGNIFICANT CHANGE UP
SODIUM SERPL-SCNC: 141 MMOL/L — SIGNIFICANT CHANGE UP (ref 135–145)
URATE SERPL-MCNC: 3.6 MG/DL — SIGNIFICANT CHANGE UP (ref 2.5–7)
WBC # BLD: 6.9 K/UL — SIGNIFICANT CHANGE UP (ref 3.8–10.5)
WBC # FLD AUTO: 6.9 K/UL — SIGNIFICANT CHANGE UP (ref 3.8–10.5)

## 2018-11-16 PROCEDURE — 99223 1ST HOSP IP/OBS HIGH 75: CPT

## 2018-11-16 PROCEDURE — 99232 SBSQ HOSP IP/OBS MODERATE 35: CPT | Mod: GC

## 2018-11-16 PROCEDURE — 99232 SBSQ HOSP IP/OBS MODERATE 35: CPT

## 2018-11-16 RX ORDER — FENTANYL CITRATE 50 UG/ML
1 INJECTION INTRAVENOUS
Qty: 0 | Refills: 0 | Status: DISCONTINUED | OUTPATIENT
Start: 2018-11-16 | End: 2018-11-20

## 2018-11-16 RX ADMIN — HYDROMORPHONE HYDROCHLORIDE 1.5 MILLIGRAM(S): 2 INJECTION INTRAMUSCULAR; INTRAVENOUS; SUBCUTANEOUS at 04:45

## 2018-11-16 RX ADMIN — HYDROMORPHONE HYDROCHLORIDE 1.5 MILLIGRAM(S): 2 INJECTION INTRAMUSCULAR; INTRAVENOUS; SUBCUTANEOUS at 12:25

## 2018-11-16 RX ADMIN — SODIUM CHLORIDE 50 MILLILITER(S): 9 INJECTION INTRAMUSCULAR; INTRAVENOUS; SUBCUTANEOUS at 22:03

## 2018-11-16 RX ADMIN — HYDROMORPHONE HYDROCHLORIDE 1.5 MILLIGRAM(S): 2 INJECTION INTRAMUSCULAR; INTRAVENOUS; SUBCUTANEOUS at 16:49

## 2018-11-16 RX ADMIN — HYDROMORPHONE HYDROCHLORIDE 1.5 MILLIGRAM(S): 2 INJECTION INTRAMUSCULAR; INTRAVENOUS; SUBCUTANEOUS at 04:30

## 2018-11-16 RX ADMIN — PANTOPRAZOLE SODIUM 40 MILLIGRAM(S): 20 TABLET, DELAYED RELEASE ORAL at 06:59

## 2018-11-16 RX ADMIN — Medication 250 MILLIGRAM(S): at 00:10

## 2018-11-16 RX ADMIN — HYDROMORPHONE HYDROCHLORIDE 1.5 MILLIGRAM(S): 2 INJECTION INTRAMUSCULAR; INTRAVENOUS; SUBCUTANEOUS at 17:10

## 2018-11-16 RX ADMIN — Medication 1 TABLET(S): at 12:06

## 2018-11-16 RX ADMIN — FENTANYL CITRATE 1 PATCH: 50 INJECTION INTRAVENOUS at 19:30

## 2018-11-16 RX ADMIN — FENTANYL CITRATE 1 PATCH: 50 INJECTION INTRAVENOUS at 16:34

## 2018-11-16 RX ADMIN — HYDROMORPHONE HYDROCHLORIDE 1.5 MILLIGRAM(S): 2 INJECTION INTRAMUSCULAR; INTRAVENOUS; SUBCUTANEOUS at 22:15

## 2018-11-16 RX ADMIN — LEVETIRACETAM 500 MILLIGRAM(S): 250 TABLET, FILM COATED ORAL at 06:59

## 2018-11-16 RX ADMIN — HYDROMORPHONE HYDROCHLORIDE 1.5 MILLIGRAM(S): 2 INJECTION INTRAMUSCULAR; INTRAVENOUS; SUBCUTANEOUS at 08:32

## 2018-11-16 RX ADMIN — Medication 1 APPLICATION(S): at 07:00

## 2018-11-16 RX ADMIN — HYDROMORPHONE HYDROCHLORIDE 1.5 MILLIGRAM(S): 2 INJECTION INTRAMUSCULAR; INTRAVENOUS; SUBCUTANEOUS at 00:09

## 2018-11-16 RX ADMIN — FENTANYL CITRATE 1 PATCH: 50 INJECTION INTRAVENOUS at 19:28

## 2018-11-16 RX ADMIN — HYDROMORPHONE HYDROCHLORIDE 1.5 MILLIGRAM(S): 2 INJECTION INTRAMUSCULAR; INTRAVENOUS; SUBCUTANEOUS at 08:50

## 2018-11-16 RX ADMIN — HYDROMORPHONE HYDROCHLORIDE 1.5 MILLIGRAM(S): 2 INJECTION INTRAMUSCULAR; INTRAVENOUS; SUBCUTANEOUS at 22:00

## 2018-11-16 RX ADMIN — LEVETIRACETAM 500 MILLIGRAM(S): 250 TABLET, FILM COATED ORAL at 17:30

## 2018-11-16 RX ADMIN — HYDROMORPHONE HYDROCHLORIDE 1.5 MILLIGRAM(S): 2 INJECTION INTRAMUSCULAR; INTRAVENOUS; SUBCUTANEOUS at 12:50

## 2018-11-16 RX ADMIN — Medication 1 APPLICATION(S): at 17:30

## 2018-11-16 RX ADMIN — HYDROMORPHONE HYDROCHLORIDE 1.5 MILLIGRAM(S): 2 INJECTION INTRAMUSCULAR; INTRAVENOUS; SUBCUTANEOUS at 00:24

## 2018-11-16 RX ADMIN — SODIUM CHLORIDE 50 MILLILITER(S): 9 INJECTION INTRAMUSCULAR; INTRAVENOUS; SUBCUTANEOUS at 07:00

## 2018-11-16 RX ADMIN — Medication 250 MILLIGRAM(S): at 12:12

## 2018-11-16 NOTE — PROGRESS NOTE ADULT - SUBJECTIVE AND OBJECTIVE BOX
Diagnosis: Relapsed ALL Ph (+)     Protocol/Chemo Regimen:  Cycle 2 Inotuzamab Days 1, 8 and 15    Day: 11    Pt endorsed:  intermittent headache controlled with pain medications     Review of Systems: Denies nausea, vomiting, diarrhea, chest pain, SOB     Pain scale: denies    Diet: Regular    Allergies    No Known Drug Allergies  shellfish (Nausea; Vomiting)    Intolerances        ANTIMICROBIALS  vancomycin  IVPB 1000 milliGRAM(s) IV Intermittent every 12 hours      HEME/ONC MEDICATIONS  alteplase for catheter clearance 2 milliGRAM(s) Catheter once      STANDING MEDICATIONS  AQUAPHOR (petrolatum Ointment) 1 Application(s) Topical two times a day  influenza   Vaccine 0.5 milliLiter(s) IntraMuscular once  levETIRAcetam 500 milliGRAM(s) Oral two times a day  multivitamin 1 Tablet(s) Oral daily  pantoprazole    Tablet 40 milliGRAM(s) Oral before breakfast  sodium chloride 0.9% lock flush 20 milliLiter(s) IV Push once  sodium chloride 0.9%. 1000 milliLiter(s) IV Continuous <Continuous>      PRN MEDICATIONS  acetaminophen   Tablet .. 650 milliGRAM(s) Oral every 6 hours PRN  acetaminophen   Tablet .. 650 milliGRAM(s) Oral every 6 hours PRN  HYDROmorphone   Tablet 2 milliGRAM(s) Oral every 4 hours PRN  metoclopramide Injectable 10 milliGRAM(s) IV Push every 6 hours PRN  sodium chloride 0.9% lock flush 10 milliLiter(s) IV Push every 1 hour PRN  sodium chloride 0.9% lock flush 10 milliLiter(s) IV Push every 12 hours PRN      Vital Signs Last 24 Hrs  T(C): 36.6 (16 Nov 2018 05:38), Max: 36.8 (15 Nov 2018 10:10)  T(F): 97.8 (16 Nov 2018 05:38), Max: 98.2 (15 Nov 2018 10:10)  HR: 73 (16 Nov 2018 05:38) (72 - 94)  BP: 128/78 (16 Nov 2018 05:38) (128/78 - 158/96)  BP(mean): --  RR: 18 (16 Nov 2018 05:38) (16 - 18)  SpO2: 96% (16 Nov 2018 05:38) (96% - 98%)    PHYSICAL EXAM  General: NAD  HEENT:  clear oropharynx, anicteric sclera, Ommaya in place right side of head  Neck: supple  CV: (+) S1/S2 RRR  Lungs: clear to auscultation, no wheezes or rales  Abdomen: soft, non-tender, non-distended (+) BS x 4Q  Ext: no edema  Skin: no rashes  Neuro: alert and oriented X 3  Central Line: PICC CDI     LABS:                              9.2    6.9   )-----------( 68       ( 16 Nov 2018 07:23 )             27.6         Mean Cell Volume : 96.9 fl  Mean Cell Hemoglobin : 32.2 pg  Mean Cell Hemoglobin Concentration : 33.3 gm/dL  Auto Neutrophil # : 3.3 K/uL  Auto Lymphocyte # : 2.0 K/uL  Auto Monocyte # : 1.2 K/uL  Auto Eosinophil # : 0.4 K/uL  Auto Basophil # : 0.0 K/uL  Auto Neutrophil % : 48.1 %  Auto Lymphocyte % : 28.9 %  Auto Monocyte % : 17.2 %  Auto Eosinophil % : 5.1 %  Auto Basophil % : 0.6 %      11-16    141  |  102  |  12  ----------------------------<  93  4.2   |  27  |  0.68    Ca    10.8<H>      16 Nov 2018 07:23  Phos  4.1     11-16  Mg     2.1     11-16    TPro  6.9  /  Alb  4.0  /  TBili  0.7  /  DBili  x   /  AST  17  /  ALT  15  /  AlkPhos  112  11-16      Mg 2.1  Phos 4.1      PT/INR - ( 15 Nov 2018 06:55 )   PT: 11.3 sec;   INR: 0.98 ratio         PTT - ( 15 Nov 2018 06:55 )  PTT:24.2 sec      Uric Acid 3.6                                RECENT CULTURES:      RADIOLOGY & ADDITIONAL STUDIES:     CT Head No Cont (11.15.18 @ 13:08) >    IMPRESSION: Slightly disproportionate cerebellar volume loss. Right   frontal Ommaya reservoir. No change in the ventricle size compared with   the prior. No other significant pathology. No change from 11/3/2018

## 2018-11-16 NOTE — PROGRESS NOTE ADULT - SUBJECTIVE AND OBJECTIVE BOX
58y old  Female who presents with a chief complaint of Headache/ r/o relapse ALL (16 Nov 2018 08:42)      Interval history:  Afebrile, complains of headaches since she was switched to po pain meds, otherwise denies any complains.     No Known Drug Allergies  shellfish (Nausea; Vomiting)    Antimicrobials:      REVIEW OF SYSTEMS:  No chest pain   No cough, no SOB  No N/V/D, no abdominal pain  No dysuria   No rash.     Vital Signs Last 24 Hrs  T(C): 36.8 (11-16-18 @ 10:19), Max: 36.8 (11-16-18 @ 10:19)  T(F): 98.3 (11-16-18 @ 10:19), Max: 98.3 (11-16-18 @ 10:19)  HR: 70 (11-16-18 @ 10:19) (70 - 78)  BP: 120/70 (11-16-18 @ 10:19) (120/70 - 148/90)  RR: 20 (11-16-18 @ 10:19) (16 - 20)  SpO2: 97% (11-16-18 @ 10:19) (96% - 98%)      PHYSICAL EXAM:  Patient in no acute distress. AAOX3.  No icterus, no oral ulcers.  Cardiovascular: S1S2 normal.  Lungs: + air entry B/L lung fields.  Gastrointestinal: soft, nontender, nondistended.  Extremities: + edema.  Rt arm PICC                          9.2    6.9   )-----------( 68       ( 16 Nov 2018 07:23 )             27.6   11-16    141  |  102  |  12  ----------------------------<  93  4.2   |  27  |  0.68    Ca    10.8<H>      16 Nov 2018 07:23  Phos  4.1     11-16  Mg     2.1     11-16    TPro  6.9  /  Alb  4.0  /  TBili  0.7  /  DBili  x   /  AST  17  /  ALT  15  /  AlkPhos  112  11-16      LIVER FUNCTIONS - ( 16 Nov 2018 07:23 )  Alb: 4.0 g/dL / Pro: 6.9 g/dL / ALK PHOS: 112 U/L / ALT: 15 U/L / AST: 17 U/L / GGT: x               Radiology:  < from: CT Head No Cont (11.15.18 @ 13:08) >  IMPRESSION: Slightly disproportionate cerebellar volume loss. Right   frontal Ommaya reservoir. No change in the ventricle size compared with   the prior. No other significant pathology. No change from 11/3/2018

## 2018-11-16 NOTE — CONSULT NOTE ADULT - ASSESSMENT
58F with PMH of obesity, HTN, ALL (s/p chemo) here with subdural hematoma 2/2 thrombocytopenia, and found to have relapsed ALL. Completing second inotuzumab cycle, and being prepped for BMT. Complicated by bacteremia completing vancomycin course, and transfusion-refractory thrombocytopenia. SDH subsequently resolved, but patient continues to have headaches. States pain started on L posterior side, stabbing, 10/10, intermittent in nature, without clear precipitating factors, but improved with opioids. Improved, but then started on R side, and was unable to transition to PO opioids. Palliative called for assistance with pain management.

## 2018-11-16 NOTE — PROGRESS NOTE ADULT - ATTENDING COMMENTS
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  s/p Masha-Auto on 12/16/16. Pt was on Sprycel maintenance. 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 Has completed first cycle Inotuzumab and cycle 2 Inotuzumab started 11/6. BCR/ABL mutation analysis pending.  -Feels better. Headaches less, but still present. CT head(11/3)- SDH has nearly resolved. Repeat HCT today.  -change IV pain meds to po  -GI bleed has resolved, Continue po Protonix.  -Now afebrile. Had been febrile on 11/2. CT c/a/p 10/16- Scattered and patchy ground glass opacities in the right upper and lower lobes, possibly infection.  Heterogeneous thickening of the endometrial cavity. BCx (11/2) from PICC growing coag negative staph, she was started on Vanco IV, plan for 14 days - finish 11/16, ID followup. repeat Cx's from PICC and peripherally NGTD. Will d/c Cefepime and mycamine now that she is not neutropenic  - goal plt ct >80K, but platelets are refractory to transfusion- receiving transfusion of 1/2 units over 3 hours twice a day, will try to decrease frequency of transfusions since no improvement in counts and monitor plts closely. Keeping hb >7.   - OOB, ambulation.  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  s/p Masha-Auto on 12/16/16. Pt was on Sprycel maintenance. 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 Has completed first cycle Inotuzumab and cycle 2 Inotuzumab started 11/6. BCR/ABL mutation analysis pending.  -Feels better. Headaches less, but still present. CT head(11/3)- SDH has nearly resolved. Repeat HCT 11/15 unchanged.  -change IV pain meds to po. Palliative care c/s to help transition  -GI bleed has resolved, Continue po Protonix.  -Now afebrile. Had been febrile on 11/2. CT c/a/p 10/16- Scattered and patchy ground glass opacities in the right upper and lower lobes, possibly infection.  Heterogeneous thickening of the endometrial cavity. BCx (11/2) from PICC growing coag negative staph, she was started on Vanco IV, plan for 14 days - finish 11/17, ID followup. repeat Cx's from PICC and peripherally NGTD.   - goal plt ct >80K, but platelets are refractory to transfusion- she has been maintaining her plt counts without transfusions last 3 days. Keeping hb >7.   - OOB, ambulation.  - pretransplant testing done

## 2018-11-16 NOTE — PROGRESS NOTE ADULT - ATTENDING COMMENTS
Omid Mota  Pager: [phone]. If no response or past 5 pm call [phone].    Will sign off, please call with questions.

## 2018-11-16 NOTE — CONSULT NOTE ADULT - PROBLEM SELECTOR RECOMMENDATION 3
- on dilaudid 0.5mg IV moderate and 1.5mg IV PRN, could not tolerate 2mg PO dilaudid transition, but may have been underdosed (1.5mg IV = about 8mg PO)  - will start fentanyl patch 37.5mcg based on her usage over the last 24 hours, patient amenable  - d/w team  - goal will be to switch to PO dilaudid 8mg PRN Q4 over the next few days

## 2018-11-16 NOTE — CONSULT NOTE ADULT - SUBJECTIVE AND OBJECTIVE BOX
HPI:  58F with PMH of obesity, HTN, ALL (s/p chemo) here with subdural hematoma 2/2 thrombocytopenia, and found to have relapsed ALL. Completing second inotuzumab cycle, and being prepped for BMT. Complicated by bacteremia completing vancomycin course, and transfusion-refractory thrombocytopenia. SDH subsequently resolved, but patient continues to have headaches. States pain started on L posterior side, stabbing, 10/10, intermittent in nature, without clear precipitating factors, but improved with opioids. Improved, but then started on R side, and was unable to transition to PO opioids. Palliative called for assistance with pain management.    PERTINENT PM/SXH:   Herpes zoster  Leukemia  Clostridium difficile diarrhea  Intractable migraine with status migrainosus, unspecified migraine type  Sciatica of right side  Chronic gastritis, presence of bleeding unspecified, unspecified gastritis type  Essential hypertension    Lipoma  Elective surgical procedure  History of  delivery    FAMILY HISTORY:  No pertinent family history in first degree relatives    ITEMS NOT CHECKED ARE NOT PRESENT    SOCIAL HISTORY:   Significant other/partner:  [ ]    Children:  [X ]    Tenriism/Spirituality:  Substance hx:  [ ]   Tobacco hx:  [ ]   Alcohol hx: [ ] Drug use hx: [ ]  Home Opioid hx:  [ X] percocet (I-Stop Reference No: [id number])  Living Situation: [ X]Home  [ ]Long term care  [ ]Rehab [ ]Other  Occupation:     ADVANCE DIRECTIVES:    DNR [ ]  MOLST  [ ]    Living Will  [ ]   DECISION MAKER(s):  [ ] Health Care Proxy(s)  [ ] Surrogate(s)  [ ] Guardian           Name(s) and Contact(s): Zaire Olivares (son) [phone]    BASELINE (I)ADL(s) (prior to admission):  [place]: [ ]Total  [ ] Moderate [ ]Dependent    Allergies    No Known Drug Allergies  shellfish (Nausea; Vomiting)    Intolerances    MEDICATIONS  (STANDING):  alteplase for catheter clearance 2 milliGRAM(s) Catheter once  AQUAPHOR (petrolatum Ointment) 1 Application(s) Topical two times a day  fentaNYL   Patch  12 MICROgram(s)/Hr 1 Patch Transdermal every 72 hours  fentaNYL   Patch  25 MICROgram(s)/Hr 1 Patch Transdermal every 72 hours  influenza   Vaccine 0.5 milliLiter(s) IntraMuscular once  levETIRAcetam 500 milliGRAM(s) Oral two times a day  multivitamin 1 Tablet(s) Oral daily  pantoprazole    Tablet 40 milliGRAM(s) Oral before breakfast  sodium chloride 0.9% lock flush 20 milliLiter(s) IV Push once  sodium chloride 0.9%. 1000 milliLiter(s) (50 mL/Hr) IV Continuous <Continuous>    MEDICATIONS  (PRN):  acetaminophen   Tablet .. 650 milliGRAM(s) Oral every 6 hours PRN Mild Pain (1 - 3), Moderate Pain (4 - 6)  acetaminophen   Tablet .. 650 milliGRAM(s) Oral every 6 hours PRN Temp greater or equal to 38C (100.4F)  HYDROmorphone  Injectable 0.5 milliGRAM(s) IV Push every 4 hours PRN Moderate Pain (4 - 6)  HYDROmorphone  Injectable 1.5 milliGRAM(s) IV Push every 4 hours PRN Severe Pain (7 - 10)  metoclopramide Injectable 10 milliGRAM(s) IV Push every 6 hours PRN nausea  sodium chloride 0.9% lock flush 10 milliLiter(s) IV Push every 1 hour PRN After each medication administration  sodium chloride 0.9% lock flush 10 milliLiter(s) IV Push every 12 hours PRN Lumen of catheter NOT used    PRESENT SYMPTOMS:   Source if other than patient:  [ ]Family   [ ]Team     Pain (Impact on QOL):    Location -       R head  Minimal acceptable level (0-10 scale):                    Aggravating factors -unclear, better with opioids  Quality - stabbing  Radiation -  Severity (0-10 scale) - 10/10 at worst  Timing - intermittent    PAIN AD Score:     [email or web address] (press ctrl +  left click to view)    Dyspnea:                           [ ]Mild [ ]Moderate [ ]Severe  Anxiety:                             [ ]Mild [ ]Moderate [ ]Severe  Fatigue:                             [ ]Mild [ ]Moderate [ ]Severe  Nausea:                             [ ]Mild [ ]Moderate [ ]Severe  Loss of appetite:              [ ]Mild [ ]Moderate [ ]Severe  Constipation:                    [ ]Mild [ ]Moderate [ ]Severe    Other Symptoms:  [ X]All other review of systems negative   [ ]Unable to obtain due to poor mentation     Karnofsky Performance Score/Palliative Performance Status Version 2:         %    PHYSICAL EXAM:  Vital Signs Last 24 Hrs  T(C): 36.8 (2018 14:27), Max: 36.8 (2018 10:19)  T(F): 98.3 (2018 14:27), Max: 98.3 (2018 10:19)  HR: 70 (2018 14:27) (70 - 78)  BP: 122/72 (2018 14:27) (120/70 - 148/90)  BP(mean): --  RR: 20 (2018 14:27) (16 - 20)  SpO2: 98% (2018 14:27) (96% - 98%) I&O's Summary    15 Nov 2018 07:01  -  2018 07:00  --------------------------------------------------------  IN: 2119 mL / OUT: 1900 mL / NET: 219 mL    2018 07:01  -  2018 16:17  --------------------------------------------------------  IN: 360 mL / OUT: 500 mL / NET: -140 mL        GENERAL:  [X ]Alert  [ ]Oriented x   [ ]Lethargic  [ ]Cachexia  [ ]Unarousable  [X ]Verbal  [ ]Non-Verbal    Behavioral:   [ ] Anxiety  [ ] Delirium [ ] Agitation [ ] Other    HEENT:  [ ]Normal   [ ]Dry mouth   [ ]ET Tube/Trach  [ ]Oral lesions    PULMONARY:   [ X]Clear [ ]Tachypnea  [ ]Audible excessive secretions   [ ]Rhonchi        [ ]Right [ ]Left [ ]Bilateral  [ ]Crackles        [ ]Right [ ]Left [ ]Bilateral  [ ]Wheezing     [ ]Right [ ]Left [ ]Bilateral    CARDIOVASCULAR:    [ X]Regular [ ]Irregular [ ]Tachy  [ ]Ellis [ ]Murmur [ ]Other    GASTROINTESTINAL:  [ X]Soft  [ ]Distended   [X ]+BS  [X ]Non tender [ ]Tender  [ ]PEG [ ]OGT/ NGT  Last BM:     GENITOURINARY:  [ ]Normal [ ] Incontinent   [ ]Oliguria/Anuria   [ ]Deng    MUSCULOSKELETAL:   [X ]Normal   [ ]Weakness  [ ]Bed/Wheelchair bound [ ]Edema    NEUROLOGIC:   [X ]No focal deficits  [ ] Cognitive impairment  [ ] Dysphagia [ ]Dysarthria [ ] Paresis [ ]Other     SKIN:   [ ]Normal   [ ]Pressure ulcer(s)  [ ]Rash    CRITICAL CARE:  [ ] Shock Present  [ ]Septic [ ]Cardiogenic [ ]Neurologic [ ]Hypovolemic  [ ]  Vasopressors [ ]  Inotropes   [ ] Respiratory failure present  [ ] Acute  [ ] Chronic [ ] Hypoxic  [ ] Hypercarbic [ ] Other  [ ] Other organ failure     LABS: reviewed                        9.2    6.9   )-----------( 68       ( 2018 07:23 )             27.6       141  |  102  |  12  ----------------------------<  93  4.2   |  27  |  0.68    Ca    10.8<H>      2018 07:23  Phos  4.1       Mg     2.1         TPro  6.9  /  Alb  4.0  /  TBili  0.7  /  DBili  x   /  AST  17  /  ALT  15  /  AlkPhos  112  -  PT/INR - ( 15 Nov 2018 06:55 )   PT: 11.3 sec;   INR: 0.98 ratio         PTT - ( 15 Nov 2018 06:55 )  PTT:24.2 sec      RADIOLOGY & ADDITIONAL STUDIES:  CT head 11/15  Slightly disproportionate cerebellar volume loss. Right   frontal Ommaya reservoir. No change in the ventricle size compared with   the prior. No other significant pathology. No change from 11/3/2018    PROTEIN CALORIE MALNUTRITION:   [ ] PPSV2 < or = to 30% [ ] significant weight loss  [ ] poor nutritional intake [ ] catabolic state [ ] anasarca     Albumin, Serum: 4.0 g/dL (18 @ 07:23)  Artificial Nutrition [ ]     REFERRALS:   [ ]Chaplaincy  [ ] Hospice  [ ]Child Life  [ ]Social Work  [ ]Case management [ ]Holistic Therapy     Goals of Care Discussion Document:     Alan Weinstein MD  Palliative Medicine  office: [phone] | [phone]  pager: [phone]

## 2018-11-16 NOTE — PROGRESS NOTE ADULT - PROBLEM SELECTOR PLAN 3
10/4 CT Head revealed small bilateral acute on chronic convexity subdural hematomas. Repeat CT Head on 10/5, 10/6 and 10/11 is stable with 10/16 results showing SDH decreasing in size. Repeat CT Head 10/23 with some small area of bleeding into previous collection.   Continue Keppra 500mg BID for seizure ppx   Pain management   Neurosurgery following, no intervention at this time.   Repeat Head CT for worsening headaches on 11/3 and 11/15  c/w nearly resolved subdural hematomas 10/4 CT Head revealed small bilateral acute on chronic convexity subdural hematomas. Repeat CT Head on 10/5, 10/6 and 10/11 is stable with 10/16 results showing SDH decreasing in size. Repeat CT Head 10/23 with some small area of bleeding into previous collection.   Continue Keppra 500mg BID for seizure ppx    Neurosurgery following, no intervention at this time.   Repeat Head CT for worsening headaches on 11/3 and 11/15  c/w nearly resolved subdural hematomas  Palliative care consulted for pain management 10/4 CT Head revealed small bilateral acute on chronic convexity subdural hematomas. Repeat CT Head on 10/5, 10/6 and 10/11 is stable with 10/16 results showing SDH decreasing in size. Repeat CT Head 10/23 with some small area of bleeding into previous collection.   Continue Keppra 500mg BID for seizure ppx    Neurosurgery following, no intervention at this time.   Repeat Head CT for worsening headaches on 11/3 and 11/15  c/w nearly resolved subdural hematomas  Palliative care consulted for pain management: started fentanyl patch

## 2018-11-16 NOTE — PROGRESS NOTE ADULT - ASSESSMENT
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 FISH and PCR negative. s/p Masha-Auto on 12/16/16. 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   Neutropenia resolved, recovered counts.   Resolved fever.    CoNS bacteremia, likely due to PICC.   Repeat blood cx NTD.   Headaches improved after switching back to iv pain meds.     Plan:   Off cefepime   Continue with Vancomycin 1 gm iv q12h  Trough 15, no changes in dose.   Follow up repeat blood cx, NTD   Will need 14 day therapy, day 14/14 today.   Stop all abx after today's dose.

## 2018-11-16 NOTE — PROGRESS NOTE ADULT - PROBLEM SELECTOR PLAN 2
Patient is not neutropenic, afebrile   Discontinued  Mycamine for ppx (No azoles with Inotuzumab), Cefepime on 11/15   Vancomycin added for BC gram + cocci in clusters on 11/2  complete 14 day course IV Vanco through 11/16 Patient is not neutropenic, afebrile   Discontinued  Mycamine for ppx (No azoles with Inotuzumab), and Cefepime on 11/15   Vancomycin added for BC gram + cocci in clusters on 11/2  will complete 14 day course IV Vanco on 11/16

## 2018-11-17 LAB
ALBUMIN SERPL ELPH-MCNC: 4 G/DL — SIGNIFICANT CHANGE UP (ref 3.3–5)
ALP SERPL-CCNC: 112 U/L — SIGNIFICANT CHANGE UP (ref 40–120)
ALT FLD-CCNC: 13 U/L — SIGNIFICANT CHANGE UP (ref 10–45)
ANION GAP SERPL CALC-SCNC: 11 MMOL/L — SIGNIFICANT CHANGE UP (ref 5–17)
AST SERPL-CCNC: 17 U/L — SIGNIFICANT CHANGE UP (ref 10–40)
BASOPHILS # BLD AUTO: 0.1 K/UL — SIGNIFICANT CHANGE UP (ref 0–0.2)
BASOPHILS NFR BLD AUTO: 0.9 % — SIGNIFICANT CHANGE UP (ref 0–2)
BILIRUB SERPL-MCNC: 0.7 MG/DL — SIGNIFICANT CHANGE UP (ref 0.2–1.2)
BUN SERPL-MCNC: 11 MG/DL — SIGNIFICANT CHANGE UP (ref 7–23)
CALCIUM SERPL-MCNC: 11 MG/DL — HIGH (ref 8.4–10.5)
CHLORIDE SERPL-SCNC: 103 MMOL/L — SIGNIFICANT CHANGE UP (ref 96–108)
CO2 SERPL-SCNC: 27 MMOL/L — SIGNIFICANT CHANGE UP (ref 22–31)
CREAT SERPL-MCNC: 0.6 MG/DL — SIGNIFICANT CHANGE UP (ref 0.5–1.3)
EOSINOPHIL # BLD AUTO: 0.4 K/UL — SIGNIFICANT CHANGE UP (ref 0–0.5)
EOSINOPHIL NFR BLD AUTO: 4.9 % — SIGNIFICANT CHANGE UP (ref 0–6)
GLUCOSE SERPL-MCNC: 90 MG/DL — SIGNIFICANT CHANGE UP (ref 70–99)
HCT VFR BLD CALC: 27.6 % — LOW (ref 34.5–45)
HGB BLD-MCNC: 9.3 G/DL — LOW (ref 11.5–15.5)
LDH SERPL L TO P-CCNC: 251 U/L — HIGH (ref 50–242)
LYMPHOCYTES # BLD AUTO: 2.1 K/UL — SIGNIFICANT CHANGE UP (ref 1–3.3)
LYMPHOCYTES # BLD AUTO: 29.1 % — SIGNIFICANT CHANGE UP (ref 13–44)
MAGNESIUM SERPL-MCNC: 2.1 MG/DL — SIGNIFICANT CHANGE UP (ref 1.6–2.6)
MCHC RBC-ENTMCNC: 32.9 PG — SIGNIFICANT CHANGE UP (ref 27–34)
MCHC RBC-ENTMCNC: 33.6 GM/DL — SIGNIFICANT CHANGE UP (ref 32–36)
MCV RBC AUTO: 98.1 FL — SIGNIFICANT CHANGE UP (ref 80–100)
MONOCYTES # BLD AUTO: 1.4 K/UL — HIGH (ref 0–0.9)
MONOCYTES NFR BLD AUTO: 18.7 % — HIGH (ref 2–14)
NEUTROPHILS # BLD AUTO: 3.3 K/UL — SIGNIFICANT CHANGE UP (ref 1.8–7.4)
NEUTROPHILS NFR BLD AUTO: 46.3 % — SIGNIFICANT CHANGE UP (ref 43–77)
PHOSPHATE SERPL-MCNC: 3.8 MG/DL — SIGNIFICANT CHANGE UP (ref 2.5–4.5)
PLATELET # BLD AUTO: 76 K/UL — LOW (ref 150–400)
POTASSIUM SERPL-MCNC: 4.1 MMOL/L — SIGNIFICANT CHANGE UP (ref 3.5–5.3)
POTASSIUM SERPL-SCNC: 4.1 MMOL/L — SIGNIFICANT CHANGE UP (ref 3.5–5.3)
PROT SERPL-MCNC: 6.8 G/DL — SIGNIFICANT CHANGE UP (ref 6–8.3)
RBC # BLD: 2.82 M/UL — LOW (ref 3.8–5.2)
RBC # FLD: 20.6 % — HIGH (ref 10.3–14.5)
SODIUM SERPL-SCNC: 141 MMOL/L — SIGNIFICANT CHANGE UP (ref 135–145)
URATE SERPL-MCNC: 3.5 MG/DL — SIGNIFICANT CHANGE UP (ref 2.5–7)
WBC # BLD: 7.2 K/UL — SIGNIFICANT CHANGE UP (ref 3.8–10.5)
WBC # FLD AUTO: 7.2 K/UL — SIGNIFICANT CHANGE UP (ref 3.8–10.5)

## 2018-11-17 PROCEDURE — 99232 SBSQ HOSP IP/OBS MODERATE 35: CPT

## 2018-11-17 RX ADMIN — LEVETIRACETAM 500 MILLIGRAM(S): 250 TABLET, FILM COATED ORAL at 05:55

## 2018-11-17 RX ADMIN — SODIUM CHLORIDE 50 MILLILITER(S): 9 INJECTION INTRAMUSCULAR; INTRAVENOUS; SUBCUTANEOUS at 05:55

## 2018-11-17 RX ADMIN — LEVETIRACETAM 500 MILLIGRAM(S): 250 TABLET, FILM COATED ORAL at 17:28

## 2018-11-17 RX ADMIN — HYDROMORPHONE HYDROCHLORIDE 1.5 MILLIGRAM(S): 2 INJECTION INTRAMUSCULAR; INTRAVENOUS; SUBCUTANEOUS at 08:25

## 2018-11-17 RX ADMIN — HYDROMORPHONE HYDROCHLORIDE 1.5 MILLIGRAM(S): 2 INJECTION INTRAMUSCULAR; INTRAVENOUS; SUBCUTANEOUS at 03:17

## 2018-11-17 RX ADMIN — HYDROMORPHONE HYDROCHLORIDE 1.5 MILLIGRAM(S): 2 INJECTION INTRAMUSCULAR; INTRAVENOUS; SUBCUTANEOUS at 21:58

## 2018-11-17 RX ADMIN — Medication 1 APPLICATION(S): at 17:28

## 2018-11-17 RX ADMIN — HYDROMORPHONE HYDROCHLORIDE 1.5 MILLIGRAM(S): 2 INJECTION INTRAMUSCULAR; INTRAVENOUS; SUBCUTANEOUS at 17:45

## 2018-11-17 RX ADMIN — HYDROMORPHONE HYDROCHLORIDE 1.5 MILLIGRAM(S): 2 INJECTION INTRAMUSCULAR; INTRAVENOUS; SUBCUTANEOUS at 17:27

## 2018-11-17 RX ADMIN — FENTANYL CITRATE 1 PATCH: 50 INJECTION INTRAVENOUS at 07:21

## 2018-11-17 RX ADMIN — Medication 1 TABLET(S): at 11:00

## 2018-11-17 RX ADMIN — PANTOPRAZOLE SODIUM 40 MILLIGRAM(S): 20 TABLET, DELAYED RELEASE ORAL at 05:55

## 2018-11-17 RX ADMIN — HYDROMORPHONE HYDROCHLORIDE 1.5 MILLIGRAM(S): 2 INJECTION INTRAMUSCULAR; INTRAVENOUS; SUBCUTANEOUS at 03:02

## 2018-11-17 RX ADMIN — Medication 1 APPLICATION(S): at 05:56

## 2018-11-17 RX ADMIN — HYDROMORPHONE HYDROCHLORIDE 1.5 MILLIGRAM(S): 2 INJECTION INTRAMUSCULAR; INTRAVENOUS; SUBCUTANEOUS at 13:14

## 2018-11-17 RX ADMIN — HYDROMORPHONE HYDROCHLORIDE 1.5 MILLIGRAM(S): 2 INJECTION INTRAMUSCULAR; INTRAVENOUS; SUBCUTANEOUS at 13:30

## 2018-11-17 RX ADMIN — HYDROMORPHONE HYDROCHLORIDE 1.5 MILLIGRAM(S): 2 INJECTION INTRAMUSCULAR; INTRAVENOUS; SUBCUTANEOUS at 21:43

## 2018-11-17 RX ADMIN — FENTANYL CITRATE 1 PATCH: 50 INJECTION INTRAVENOUS at 19:15

## 2018-11-17 RX ADMIN — HYDROMORPHONE HYDROCHLORIDE 1.5 MILLIGRAM(S): 2 INJECTION INTRAMUSCULAR; INTRAVENOUS; SUBCUTANEOUS at 08:10

## 2018-11-17 RX ADMIN — SODIUM CHLORIDE 50 MILLILITER(S): 9 INJECTION INTRAMUSCULAR; INTRAVENOUS; SUBCUTANEOUS at 21:45

## 2018-11-17 NOTE — PROGRESS NOTE ADULT - PROBLEM SELECTOR PLAN 2
Patient is not neutropenic, afebrile   Discontinued  Mycamine for ppx (No azoles with Inotuzumab), and Cefepime on 11/15   Vancomycin added for BC gram + cocci in clusters on 11/2  will complete 14 day course IV Vanco on 11/16

## 2018-11-17 NOTE — PROGRESS NOTE ADULT - ATTENDING COMMENTS
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  s/p Masha-Auto on 12/16/16. Pt was on Sprycel maintenance. 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 Has completed first cycle Inotuzumab and cycle 2 Inotuzumab started 11/6. BCR/ABL mutation analysis pending.  -Feels better. Headaches less, but still present. CT head(11/3)- SDH has nearly resolved. Repeat HCT 11/15 unchanged.  -change IV pain meds to po. Palliative care c/s to help transition  -GI bleed has resolved, Continue po Protonix.  -Now afebrile. Had been febrile on 11/2. CT c/a/p 10/16- Scattered and patchy ground glass opacities in the right upper and lower lobes, possibly infection.  Heterogeneous thickening of the endometrial cavity. BCx (11/2) from PICC growing coag negative staph, she was started on Vanco IV, plan for 14 days - finish 11/17, ID followup. repeat Cx's from PICC and peripherally NGTD.   - goal plt ct >80K, but platelets are refractory to transfusion- she has been maintaining her plt counts without transfusions last 3 days. Keeping hb >7.   - OOB, ambulation.  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  s/p Masha-Auto on 12/16/16. Pt was on Sprycel maintenance. 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 Has completed first cycle Inotuzumab and cycle 2 Inotuzumab started 11/6. BCR/ABL mutation analysis pending.  -Feels better. Headaches less, but still present. CT head(11/3)- SDH has nearly resolved. Repeat HCT 11/15 unchanged.  -change IV pain meds to po. Palliative care c/s to help transition, this will assist with discharge  -GI bleed has resolved, Continue po Protonix.  -Now afebrile. Had been febrile on 11/2. CT c/a/p 10/16- Scattered and patchy ground glass opacities in the right upper and lower lobes, possibly infection.  Heterogeneous thickening of the endometrial cavity. BCx (11/2) from PICC growing coag negative staph, she was started on Vanco IV, plan for 14 days - finish 11/17, ID followup. repeat Cx's from PICC and peripherally NGTD.   - goal plt ct >80K, but platelets are refractory to transfusion- she has been maintaining her plt counts without transfusions last 3 days. Keeping hb >7.   - OOB, ambulation.  - pretransplant testing done

## 2018-11-17 NOTE — PROGRESS NOTE ADULT - SUBJECTIVE AND OBJECTIVE BOX
Diagnosis: Relapsed ALL Ph (+)     Protocol/Chemo Regimen:  Cycle 2 Inotuzamab Days 1, 8 and 15    Day: 12    Pt endorsed:  intermittent headache controlled with pain medications     Review of Systems: Denies nausea, vomiting, diarrhea, chest pain, SOB     Pain scale: denies    Diet: Regular    Allergies  No Known Drug Allergies  shellfish (Nausea; Vomiting)  Intolerances      HEME/ONC MEDICATIONS  alteplase for catheter clearance 2 milliGRAM(s) Catheter once      STANDING MEDICATIONS  AQUAPHOR (petrolatum Ointment) 1 Application(s) Topical two times a day  fentaNYL   Patch  12 MICROgram(s)/Hr 1 Patch Transdermal every 72 hours  fentaNYL   Patch  25 MICROgram(s)/Hr 1 Patch Transdermal every 72 hours  influenza   Vaccine 0.5 milliLiter(s) IntraMuscular once  levETIRAcetam 500 milliGRAM(s) Oral two times a day  multivitamin 1 Tablet(s) Oral daily  pantoprazole    Tablet 40 milliGRAM(s) Oral before breakfast  sodium chloride 0.9% lock flush 20 milliLiter(s) IV Push once  sodium chloride 0.9%. 1000 milliLiter(s) IV Continuous <Continuous>      PRN MEDICATIONS  acetaminophen   Tablet .. 650 milliGRAM(s) Oral every 6 hours PRN  acetaminophen   Tablet .. 650 milliGRAM(s) Oral every 6 hours PRN  HYDROmorphone  Injectable 0.5 milliGRAM(s) IV Push every 4 hours PRN  HYDROmorphone  Injectable 1.5 milliGRAM(s) IV Push every 4 hours PRN  metoclopramide Injectable 10 milliGRAM(s) IV Push every 6 hours PRN  sodium chloride 0.9% lock flush 10 milliLiter(s) IV Push every 1 hour PRN  sodium chloride 0.9% lock flush 10 milliLiter(s) IV Push every 12 hours PRN        Vital Signs Last 24 Hrs  T(C): 36.6 (17 Nov 2018 05:41), Max: 36.8 (16 Nov 2018 10:19)  T(F): 97.9 (17 Nov 2018 05:41), Max: 98.3 (16 Nov 2018 10:19)  HR: 77 (17 Nov 2018 05:41) (70 - 84)  BP: 136/83 (17 Nov 2018 05:41) (120/70 - 155/85)  BP(mean): --  RR: 20 (17 Nov 2018 05:41) (20 - 20)  SpO2: 100% (17 Nov 2018 05:41) (97% - 100%)    PHYSICAL EXAM  General: adult in NAD  HEENT: clear oropharynx, anicteric sclera, pink conjunctiva  Neck: supple  CV: normal S1/S2 RRR  Lungs: positive air movement b/l ant lungs,clear to auscultation, no wheezes, no rales  Abdomen: soft non-tender non-distended, no hepatosplenomegaly  Ext: no clubbing cyanosis or edema  Skin: no rashes and no petechiae  Neuro: alert and oriented X 4, no focal deficits  Central Line: normal    LABS:    Blood Cultures:                           9.3    7.2   )-----------( 76       ( 17 Nov 2018 06:42 )             27.6         Mean Cell Volume : 98.1 fl  Mean Cell Hemoglobin : 32.9 pg  Mean Cell Hemoglobin Concentration : 33.6 gm/dL  Auto Neutrophil # : 3.3 K/uL  Auto Lymphocyte # : 2.1 K/uL  Auto Monocyte # : 1.4 K/uL  Auto Eosinophil # : 0.4 K/uL  Auto Basophil # : 0.1 K/uL  Auto Neutrophil % : 46.3 %  Auto Lymphocyte % : 29.1 %  Auto Monocyte % : 18.7 %  Auto Eosinophil % : 4.9 %  Auto Basophil % : 0.9 %      11-17    141  |  103  |  11  ----------------------------<  90  4.1   |  27  |  0.60    Ca    11.0<H>      17 Nov 2018 06:42  Phos  3.8     11-17  Mg     2.1     11-17    TPro  6.8  /  Alb  4.0  /  TBili  0.7  /  DBili  x   /  AST  17  /  ALT  13  /  AlkPhos  112  11-17      Mg 2.1  Phos 3.8            Uric Acid 3.5        RADIOLOGY & ADDITIONAL STUDIES: Diagnosis: Relapsed ALL Ph (+)     Protocol/Chemo Regimen:  Cycle 2 Inotuzamab Days 1, 8 and 15    Day: 12    Pt endorsed:  intermittent headache controlled with pain medications     Review of Systems: Denies nausea, vomiting, diarrhea, chest pain, SOB     Pain scale: denies    Diet: Regular    Allergies  No Known Drug Allergies  shellfish (Nausea; Vomiting)  Intolerances      HEME/ONC MEDICATIONS  alteplase for catheter clearance 2 milliGRAM(s) Catheter once      STANDING MEDICATIONS  AQUAPHOR (petrolatum Ointment) 1 Application(s) Topical two times a day  fentaNYL   Patch  12 MICROgram(s)/Hr 1 Patch Transdermal every 72 hours  fentaNYL   Patch  25 MICROgram(s)/Hr 1 Patch Transdermal every 72 hours  influenza   Vaccine 0.5 milliLiter(s) IntraMuscular once  levETIRAcetam 500 milliGRAM(s) Oral two times a day  multivitamin 1 Tablet(s) Oral daily  pantoprazole    Tablet 40 milliGRAM(s) Oral before breakfast  sodium chloride 0.9% lock flush 20 milliLiter(s) IV Push once  sodium chloride 0.9%. 1000 milliLiter(s) IV Continuous <Continuous>      PRN MEDICATIONS  acetaminophen   Tablet .. 650 milliGRAM(s) Oral every 6 hours PRN  acetaminophen   Tablet .. 650 milliGRAM(s) Oral every 6 hours PRN  HYDROmorphone  Injectable 0.5 milliGRAM(s) IV Push every 4 hours PRN  HYDROmorphone  Injectable 1.5 milliGRAM(s) IV Push every 4 hours PRN  metoclopramide Injectable 10 milliGRAM(s) IV Push every 6 hours PRN  sodium chloride 0.9% lock flush 10 milliLiter(s) IV Push every 1 hour PRN  sodium chloride 0.9% lock flush 10 milliLiter(s) IV Push every 12 hours PRN        Vital Signs Last 24 Hrs  T(C): 36.6 (17 Nov 2018 05:41), Max: 36.8 (16 Nov 2018 10:19)  T(F): 97.9 (17 Nov 2018 05:41), Max: 98.3 (16 Nov 2018 10:19)  HR: 77 (17 Nov 2018 05:41) (70 - 84)  BP: 136/83 (17 Nov 2018 05:41) (120/70 - 155/85)  BP(mean): --  RR: 20 (17 Nov 2018 05:41) (20 - 20)  SpO2: 100% (17 Nov 2018 05:41) (97% - 100%)    PHYSICAL EXAM  General: adult in NAD  HEENT: clear oropharynx, anicteric sclera, pink conjunctiva  Neck: supple  CV: normal S1/S2 RRR  Lungs: positive air movement b/l ant lungs,clear to auscultation, no wheezes, no rales  Abdomen: soft non-tender non-distended  Ext: no clubbing cyanosis or edema  Skin: no rashes and no petechiae  Neuro: alert and oriented X 4, no focal deficits  Central Line: RUE PICC c/d/i    LABS:    Cultures:     Culture - Blood in AM (11.05.18 @ 08:42)    Specimen Source: .Blood Blood-Peripheral    Culture Results:   No growth at 5 days.    Culture - Blood in AM (11.05.18 @ 08:42)    Specimen Source: .Blood Blood-Catheter    Culture Results:   No growth at 5 days.                          9.3    7.2   )-----------( 76       ( 17 Nov 2018 06:42 )             27.6       Mean Cell Volume : 98.1 fl  Mean Cell Hemoglobin : 32.9 pg  Mean Cell Hemoglobin Concentration : 33.6 gm/dL  Auto Neutrophil # : 3.3 K/uL  Auto Lymphocyte # : 2.1 K/uL  Auto Monocyte # : 1.4 K/uL  Auto Eosinophil # : 0.4 K/uL  Auto Basophil # : 0.1 K/uL  Auto Neutrophil % : 46.3 %  Auto Lymphocyte % : 29.1 %  Auto Monocyte % : 18.7 %  Auto Eosinophil % : 4.9 %  Auto Basophil % : 0.9 %      11-17    141  |  103  |  11  ----------------------------<  90  4.1   |  27  |  0.60    Ca    11.0<H>      17 Nov 2018 06:42  Phos  3.8     11-17  Mg     2.1     11-17    TPro  6.8  /  Alb  4.0  /  TBili  0.7  /  DBili  x   /  AST  17  /  ALT  13  /  AlkPhos  112  11-17    Mg 2.1  Phos 3.8      Uric Acid 3.5      RADIOLOGY & ADDITIONAL STUDIES:    from: CT Head No Cont (11.15.18 @ 13:08)     IMPRESSION: Slightly disproportionate cerebellar volume loss. Right   frontal Ommaya reservoir. No change in the ventricle size compared with   the prior. No other significant pathology. No change from 11/3/2018

## 2018-11-17 NOTE — PROGRESS NOTE ADULT - PROBLEM SELECTOR PLAN 3
10/4 CT Head revealed small bilateral acute on chronic convexity subdural hematomas. Repeat CT Head on 10/5, 10/6 and 10/11 is stable with 10/16 results showing SDH decreasing in size. Repeat CT Head 10/23 with some small area of bleeding into previous collection.   Continue Keppra 500mg BID for seizure ppx    Neurosurgery following, no intervention at this time.   Repeat Head CT for worsening headaches on 11/3 and 11/15  c/w nearly resolved subdural hematomas  Palliative care consulted for pain management: started fentanyl patch

## 2018-11-18 LAB
ALBUMIN SERPL ELPH-MCNC: 3.8 G/DL — SIGNIFICANT CHANGE UP (ref 3.3–5)
ALP SERPL-CCNC: 109 U/L — SIGNIFICANT CHANGE UP (ref 40–120)
ALT FLD-CCNC: 16 U/L — SIGNIFICANT CHANGE UP (ref 10–45)
ANION GAP SERPL CALC-SCNC: 10 MMOL/L — SIGNIFICANT CHANGE UP (ref 5–17)
AST SERPL-CCNC: 18 U/L — SIGNIFICANT CHANGE UP (ref 10–40)
BASOPHILS # BLD AUTO: 0.1 K/UL — SIGNIFICANT CHANGE UP (ref 0–0.2)
BASOPHILS NFR BLD AUTO: 1.1 % — SIGNIFICANT CHANGE UP (ref 0–2)
BILIRUB SERPL-MCNC: 0.6 MG/DL — SIGNIFICANT CHANGE UP (ref 0.2–1.2)
BUN SERPL-MCNC: 13 MG/DL — SIGNIFICANT CHANGE UP (ref 7–23)
CALCIUM SERPL-MCNC: 10.9 MG/DL — HIGH (ref 8.4–10.5)
CHLORIDE SERPL-SCNC: 103 MMOL/L — SIGNIFICANT CHANGE UP (ref 96–108)
CO2 SERPL-SCNC: 27 MMOL/L — SIGNIFICANT CHANGE UP (ref 22–31)
CREAT SERPL-MCNC: 0.63 MG/DL — SIGNIFICANT CHANGE UP (ref 0.5–1.3)
EOSINOPHIL # BLD AUTO: 0.4 K/UL — SIGNIFICANT CHANGE UP (ref 0–0.5)
EOSINOPHIL NFR BLD AUTO: 5.5 % — SIGNIFICANT CHANGE UP (ref 0–6)
GLUCOSE SERPL-MCNC: 95 MG/DL — SIGNIFICANT CHANGE UP (ref 70–99)
HCT VFR BLD CALC: 28.6 % — LOW (ref 34.5–45)
HGB BLD-MCNC: 9.4 G/DL — LOW (ref 11.5–15.5)
LDH SERPL L TO P-CCNC: 231 U/L — SIGNIFICANT CHANGE UP (ref 50–242)
LYMPHOCYTES # BLD AUTO: 2 K/UL — SIGNIFICANT CHANGE UP (ref 1–3.3)
LYMPHOCYTES # BLD AUTO: 29.4 % — SIGNIFICANT CHANGE UP (ref 13–44)
MAGNESIUM SERPL-MCNC: 2.1 MG/DL — SIGNIFICANT CHANGE UP (ref 1.6–2.6)
MCHC RBC-ENTMCNC: 32.5 PG — SIGNIFICANT CHANGE UP (ref 27–34)
MCHC RBC-ENTMCNC: 32.7 GM/DL — SIGNIFICANT CHANGE UP (ref 32–36)
MCV RBC AUTO: 99.4 FL — SIGNIFICANT CHANGE UP (ref 80–100)
MONOCYTES # BLD AUTO: 1.2 K/UL — HIGH (ref 0–0.9)
MONOCYTES NFR BLD AUTO: 17.4 % — HIGH (ref 2–14)
NEUTROPHILS # BLD AUTO: 3.1 K/UL — SIGNIFICANT CHANGE UP (ref 1.8–7.4)
NEUTROPHILS NFR BLD AUTO: 46.6 % — SIGNIFICANT CHANGE UP (ref 43–77)
PHOSPHATE SERPL-MCNC: 3.9 MG/DL — SIGNIFICANT CHANGE UP (ref 2.5–4.5)
PLATELET # BLD AUTO: 74 K/UL — LOW (ref 150–400)
POTASSIUM SERPL-MCNC: 4.1 MMOL/L — SIGNIFICANT CHANGE UP (ref 3.5–5.3)
POTASSIUM SERPL-SCNC: 4.1 MMOL/L — SIGNIFICANT CHANGE UP (ref 3.5–5.3)
PROT SERPL-MCNC: 6.7 G/DL — SIGNIFICANT CHANGE UP (ref 6–8.3)
RBC # BLD: 2.88 M/UL — LOW (ref 3.8–5.2)
RBC # FLD: 20.7 % — HIGH (ref 10.3–14.5)
SODIUM SERPL-SCNC: 140 MMOL/L — SIGNIFICANT CHANGE UP (ref 135–145)
URATE SERPL-MCNC: 3.4 MG/DL — SIGNIFICANT CHANGE UP (ref 2.5–7)
WBC # BLD: 6.7 K/UL — SIGNIFICANT CHANGE UP (ref 3.8–10.5)
WBC # FLD AUTO: 6.7 K/UL — SIGNIFICANT CHANGE UP (ref 3.8–10.5)

## 2018-11-18 PROCEDURE — 99232 SBSQ HOSP IP/OBS MODERATE 35: CPT

## 2018-11-18 RX ADMIN — HYDROMORPHONE HYDROCHLORIDE 1.5 MILLIGRAM(S): 2 INJECTION INTRAMUSCULAR; INTRAVENOUS; SUBCUTANEOUS at 11:23

## 2018-11-18 RX ADMIN — PANTOPRAZOLE SODIUM 40 MILLIGRAM(S): 20 TABLET, DELAYED RELEASE ORAL at 06:51

## 2018-11-18 RX ADMIN — HYDROMORPHONE HYDROCHLORIDE 1.5 MILLIGRAM(S): 2 INJECTION INTRAMUSCULAR; INTRAVENOUS; SUBCUTANEOUS at 11:40

## 2018-11-18 RX ADMIN — FENTANYL CITRATE 1 PATCH: 50 INJECTION INTRAVENOUS at 19:31

## 2018-11-18 RX ADMIN — SODIUM CHLORIDE 50 MILLILITER(S): 9 INJECTION INTRAMUSCULAR; INTRAVENOUS; SUBCUTANEOUS at 06:51

## 2018-11-18 RX ADMIN — FENTANYL CITRATE 1 PATCH: 50 INJECTION INTRAVENOUS at 04:58

## 2018-11-18 RX ADMIN — Medication 1 APPLICATION(S): at 06:51

## 2018-11-18 RX ADMIN — Medication 1 TABLET(S): at 11:23

## 2018-11-18 RX ADMIN — HYDROMORPHONE HYDROCHLORIDE 1.5 MILLIGRAM(S): 2 INJECTION INTRAMUSCULAR; INTRAVENOUS; SUBCUTANEOUS at 07:16

## 2018-11-18 RX ADMIN — HYDROMORPHONE HYDROCHLORIDE 1.5 MILLIGRAM(S): 2 INJECTION INTRAMUSCULAR; INTRAVENOUS; SUBCUTANEOUS at 02:40

## 2018-11-18 RX ADMIN — HYDROMORPHONE HYDROCHLORIDE 1.5 MILLIGRAM(S): 2 INJECTION INTRAMUSCULAR; INTRAVENOUS; SUBCUTANEOUS at 21:28

## 2018-11-18 RX ADMIN — LEVETIRACETAM 500 MILLIGRAM(S): 250 TABLET, FILM COATED ORAL at 17:04

## 2018-11-18 RX ADMIN — HYDROMORPHONE HYDROCHLORIDE 1.5 MILLIGRAM(S): 2 INJECTION INTRAMUSCULAR; INTRAVENOUS; SUBCUTANEOUS at 06:55

## 2018-11-18 RX ADMIN — LEVETIRACETAM 500 MILLIGRAM(S): 250 TABLET, FILM COATED ORAL at 06:51

## 2018-11-18 RX ADMIN — HYDROMORPHONE HYDROCHLORIDE 1.5 MILLIGRAM(S): 2 INJECTION INTRAMUSCULAR; INTRAVENOUS; SUBCUTANEOUS at 20:58

## 2018-11-18 RX ADMIN — HYDROMORPHONE HYDROCHLORIDE 1.5 MILLIGRAM(S): 2 INJECTION INTRAMUSCULAR; INTRAVENOUS; SUBCUTANEOUS at 16:04

## 2018-11-18 RX ADMIN — HYDROMORPHONE HYDROCHLORIDE 1.5 MILLIGRAM(S): 2 INJECTION INTRAMUSCULAR; INTRAVENOUS; SUBCUTANEOUS at 16:24

## 2018-11-18 RX ADMIN — HYDROMORPHONE HYDROCHLORIDE 1.5 MILLIGRAM(S): 2 INJECTION INTRAMUSCULAR; INTRAVENOUS; SUBCUTANEOUS at 02:20

## 2018-11-18 RX ADMIN — FENTANYL CITRATE 1 PATCH: 50 INJECTION INTRAVENOUS at 07:17

## 2018-11-18 RX ADMIN — Medication 1 APPLICATION(S): at 17:04

## 2018-11-18 NOTE — PROGRESS NOTE ADULT - SUBJECTIVE AND OBJECTIVE BOX
Diagnosis: Relapsed ALL Ph (+)     Protocol/Chemo Regimen:  Cycle 2 Inotuzamab Days 1, 8 and 15    Day: 13    Pt endorsed:  intermittent headache controlled with pain medications     Review of Systems: Denies nausea, vomiting, diarrhea, chest pain, SOB     Pain scale: denies    Diet: Regular    Allergies  No Known Drug Allergies  shellfish (Nausea; Vomiting)  Intolerances        HEME/ONC MEDICATIONS  alteplase for catheter clearance 2 milliGRAM(s) Catheter once      STANDING MEDICATIONS  AQUAPHOR (petrolatum Ointment) 1 Application(s) Topical two times a day  fentaNYL   Patch  12 MICROgram(s)/Hr 1 Patch Transdermal every 72 hours  fentaNYL   Patch  25 MICROgram(s)/Hr 1 Patch Transdermal every 72 hours  influenza   Vaccine 0.5 milliLiter(s) IntraMuscular once  levETIRAcetam 500 milliGRAM(s) Oral two times a day  multivitamin 1 Tablet(s) Oral daily  pantoprazole    Tablet 40 milliGRAM(s) Oral before breakfast  sodium chloride 0.9% lock flush 20 milliLiter(s) IV Push once  sodium chloride 0.9%. 1000 milliLiter(s) IV Continuous <Continuous>      PRN MEDICATIONS  acetaminophen   Tablet .. 650 milliGRAM(s) Oral every 6 hours PRN  acetaminophen   Tablet .. 650 milliGRAM(s) Oral every 6 hours PRN  HYDROmorphone  Injectable 0.5 milliGRAM(s) IV Push every 4 hours PRN  HYDROmorphone  Injectable 1.5 milliGRAM(s) IV Push every 4 hours PRN  metoclopramide Injectable 10 milliGRAM(s) IV Push every 6 hours PRN  sodium chloride 0.9% lock flush 10 milliLiter(s) IV Push every 1 hour PRN  sodium chloride 0.9% lock flush 10 milliLiter(s) IV Push every 12 hours PRN        Vital Signs Last 24 Hrs  T(C): 36.4 (18 Nov 2018 05:20), Max: 36.9 (17 Nov 2018 13:56)  T(F): 97.6 (18 Nov 2018 05:20), Max: 98.4 (17 Nov 2018 13:56)  HR: 76 (18 Nov 2018 05:20) (76 - 86)  BP: 136/83 (18 Nov 2018 05:20) (123/72 - 145/90)  BP(mean): --  RR: 18 (18 Nov 2018 05:20) (18 - 20)  SpO2: 100% (18 Nov 2018 05:20) (97% - 100%)    PHYSICAL EXAM  General: adult in NAD  HEENT: clear oropharynx, anicteric sclera, pink conjunctiva  Neck: supple  CV: normal S1/S2 RRR  Lungs: positive air movement b/l ant lungs,clear to auscultation, no wheezes, no rales  Abdomen: soft non-tender non-distended, no hepatosplenomegaly  Ext: no clubbing cyanosis or edema  Skin: no rashes and no petechiae  Neuro: alert and oriented X 4, no focal deficits  Central Line: normal    LABS:    Blood Cultures:                           9.4    6.7   )-----------( 74       ( 18 Nov 2018 07:17 )             28.6         Mean Cell Volume : 99.4 fl  Mean Cell Hemoglobin : 32.5 pg  Mean Cell Hemoglobin Concentration : 32.7 gm/dL  Auto Neutrophil # : 3.1 K/uL  Auto Lymphocyte # : 2.0 K/uL  Auto Monocyte # : 1.2 K/uL  Auto Eosinophil # : 0.4 K/uL  Auto Basophil # : 0.1 K/uL  Auto Neutrophil % : 46.6 %  Auto Lymphocyte % : 29.4 %  Auto Monocyte % : 17.4 %  Auto Eosinophil % : 5.5 %  Auto Basophil % : 1.1 %      11-18    140  |  103  |  13  ----------------------------<  95  4.1   |  27  |  0.63    Ca    10.9<H>      18 Nov 2018 07:16  Phos  3.9     11-18  Mg     2.1     11-18    TPro  6.7  /  Alb  3.8  /  TBili  0.6  /  DBili  x   /  AST  18  /  ALT  16  /  AlkPhos  109  11-18      Mg 2.1  Phos 3.9            Uric Acid 3.4        RADIOLOGY & ADDITIONAL STUDIES: Diagnosis: Relapsed ALL Ph (+)     Protocol/Chemo Regimen:  Cycle 2 Inotuzamab Days 1, 8 and 15    Day: 13    Pt endorsed:  intermittent headache controlled with pain medications     Review of Systems: Denies nausea, vomiting, diarrhea, chest pain, SOB     Pain scale: denies    Diet: Regular    Allergies  No Known Drug Allergies  shellfish (Nausea; Vomiting)  Intolerances      HEME/ONC MEDICATIONS  alteplase for catheter clearance 2 milliGRAM(s) Catheter once      STANDING MEDICATIONS  AQUAPHOR (petrolatum Ointment) 1 Application(s) Topical two times a day  fentaNYL   Patch  12 MICROgram(s)/Hr 1 Patch Transdermal every 72 hours  fentaNYL   Patch  25 MICROgram(s)/Hr 1 Patch Transdermal every 72 hours  influenza   Vaccine 0.5 milliLiter(s) IntraMuscular once  levETIRAcetam 500 milliGRAM(s) Oral two times a day  multivitamin 1 Tablet(s) Oral daily  pantoprazole    Tablet 40 milliGRAM(s) Oral before breakfast  sodium chloride 0.9% lock flush 20 milliLiter(s) IV Push once  sodium chloride 0.9%. 1000 milliLiter(s) IV Continuous <Continuous>      PRN MEDICATIONS  acetaminophen   Tablet .. 650 milliGRAM(s) Oral every 6 hours PRN  acetaminophen   Tablet .. 650 milliGRAM(s) Oral every 6 hours PRN  HYDROmorphone  Injectable 0.5 milliGRAM(s) IV Push every 4 hours PRN  HYDROmorphone  Injectable 1.5 milliGRAM(s) IV Push every 4 hours PRN  metoclopramide Injectable 10 milliGRAM(s) IV Push every 6 hours PRN  sodium chloride 0.9% lock flush 10 milliLiter(s) IV Push every 1 hour PRN  sodium chloride 0.9% lock flush 10 milliLiter(s) IV Push every 12 hours PRN      Vital Signs Last 24 Hrs  T(C): 36.4 (18 Nov 2018 05:20), Max: 36.9 (17 Nov 2018 13:56)  T(F): 97.6 (18 Nov 2018 05:20), Max: 98.4 (17 Nov 2018 13:56)  HR: 76 (18 Nov 2018 05:20) (76 - 86)  BP: 136/83 (18 Nov 2018 05:20) (123/72 - 145/90)  RR: 18 (18 Nov 2018 05:20) (18 - 20)  SpO2: 100% (18 Nov 2018 05:20) (97% - 100%)      PHYSICAL EXAM  General: adult in NAD  HEENT: clear oropharynx, anicteric sclera, pink conjunctiva  Neck: supple  CV: normal S1/S2 RRR  Lungs: positive air movement b/l ant lungs,clear to auscultation, no wheezes, no rales  Abdomen: soft non-tender non-distended  Ext: no clubbing cyanosis or edema  Skin: no rashes and no petechiae  Neuro: alert and oriented X 4, no focal deficits  Central Line: RUE PICC c/d/i    LABS:    Cultures:    Culture - Blood in AM (11.05.18 @ 08:42)    Specimen Source: .Blood Blood-Peripheral    Culture Results:   No growth at 5 days.    Culture - Blood in AM (11.05.18 @ 08:42)    Specimen Source: .Blood Blood-Catheter    Culture Results:   No growth at 5 days.                           9.4    6.7   )-----------( 74       ( 18 Nov 2018 07:17 )             28.6         Mean Cell Volume : 99.4 fl  Mean Cell Hemoglobin : 32.5 pg  Mean Cell Hemoglobin Concentration : 32.7 gm/dL  Auto Neutrophil # : 3.1 K/uL  Auto Lymphocyte # : 2.0 K/uL  Auto Monocyte # : 1.2 K/uL  Auto Eosinophil # : 0.4 K/uL  Auto Basophil # : 0.1 K/uL  Auto Neutrophil % : 46.6 %  Auto Lymphocyte % : 29.4 %  Auto Monocyte % : 17.4 %  Auto Eosinophil % : 5.5 %  Auto Basophil % : 1.1 %      11-18    140  |  103  |  13  ----------------------------<  95  4.1   |  27  |  0.63    Ca    10.9<H>      18 Nov 2018 07:16  Phos  3.9     11-18  Mg     2.1     11-18    TPro  6.7  /  Alb  3.8  /  TBili  0.6  /  DBili  x   /  AST  18  /  ALT  16  /  AlkPhos  109  11-18      Mg 2.1  Phos 3.9        Uric Acid 3.4        RADIOLOGY & ADDITIONAL STUDIES:    from: CT Head No Cont (11.15.18 @ 13:08)   IMPRESSION: Slightly disproportionate cerebellar volume loss. Right   frontal Ommaya reservoir. No change in the ventricle size compared with   the prior. No other significant pathology. No change from 11/3/2018

## 2018-11-18 NOTE — PROGRESS NOTE ADULT - ATTENDING COMMENTS
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  s/p Masha-Auto on 12/16/16. Pt was on Sprycel maintenance. 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 Has completed first cycle Inotuzumab and cycle 2 Inotuzumab started 11/6. BCR/ABL mutation analysis pending.  -Feels better. Headaches less, but still present. CT head(11/3)- SDH has nearly resolved. Repeat HCT 11/15 unchanged.  -change IV pain meds to po. Palliative care c/s to help transition, this will assist with discharge  -GI bleed has resolved, Continue po Protonix.  -Now afebrile. Had been febrile on 11/2. CT c/a/p 10/16- Scattered and patchy ground glass opacities in the right upper and lower lobes, possibly infection.  Heterogeneous thickening of the endometrial cavity. BCx (11/2) from PICC growing coag negative staph, she was started on Vanco IV, plan for 14 days - finish 11/17, ID followup. repeat Cx's from PICC and peripherally NGTD.   - goal plt ct >80K, but platelets are refractory to transfusion- she has been maintaining her plt counts without transfusions last 3 days. Keeping hb >7.   - OOB, ambulation.  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  s/p Masha-Auto on 12/16/16. Pt was on Sprycel maintenance. 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 Has completed first cycle Inotuzumab and cycle 2 Inotuzumab started 11/6. BCR/ABL mutation analysis pending.  -Feels better, headache improved- fentanyl patch helping feels pain is controlled enough for going home possibly. CT head(11/3)- SDH has nearly resolved. Repeat HCT 11/15 unchanged.  -GI bleed has resolved, Continue po Protonix.  -Now afebrile. Had been febrile on 11/2. CT c/a/p 10/16- Scattered and patchy ground glass opacities in the right upper and lower lobes, possibly infection.  Heterogeneous thickening of the endometrial cavity. BCx (11/2) from PICC growing coag negative staph, she was started on Vanco IV, plan for 14 days - finish 11/17, ID followup. repeat Cx's from PICC and peripherally NGTD.   - goal plt ct >80K, but platelets are refractory to transfusion- she has been maintaining her plt counts without transfusions last 3 days. Keeping hb >7.   - OOB, ambulation.  - pretransplant testing done  -next inotuzumab due tuesday, possibly give med on tues and discharge after, patient will discuss with her son to make sure she can be picked up on tues evening if she is doing well

## 2018-11-18 NOTE — PROGRESS NOTE ADULT - PROBLEM SELECTOR PLAN 2
Patient is not neutropenic, afebrile   Discontinued  Mycamine for ppx (No azoles with Inotuzumab), and Cefepime on 11/15   Vancomycin added for BC gram + cocci in clusters on 11/2  will complete 14 day course IV Vanco on 11/16 Patient is not neutropenic, afebrile   Discontinued  Mycamine for ppx (No azoles with Inotuzumab), and Cefepime on 11/15   Vancomycin added for BC positive for Coag Neg Staph  on 11/2   completed 14 day course IV Vanco on 11/16

## 2018-11-19 LAB
ALBUMIN SERPL ELPH-MCNC: 3.8 G/DL — SIGNIFICANT CHANGE UP (ref 3.3–5)
ALP SERPL-CCNC: 109 U/L — SIGNIFICANT CHANGE UP (ref 40–120)
ALT FLD-CCNC: 16 U/L — SIGNIFICANT CHANGE UP (ref 10–45)
ANION GAP SERPL CALC-SCNC: 13 MMOL/L — SIGNIFICANT CHANGE UP (ref 5–17)
APTT BLD: 29.4 SEC — SIGNIFICANT CHANGE UP (ref 27.5–36.3)
AST SERPL-CCNC: 22 U/L — SIGNIFICANT CHANGE UP (ref 10–40)
BASOPHILS # BLD AUTO: 0.1 K/UL — SIGNIFICANT CHANGE UP (ref 0–0.2)
BASOPHILS NFR BLD AUTO: 0.8 % — SIGNIFICANT CHANGE UP (ref 0–2)
BILIRUB SERPL-MCNC: 0.5 MG/DL — SIGNIFICANT CHANGE UP (ref 0.2–1.2)
BLD GP AB SCN SERPL QL: POSITIVE — SIGNIFICANT CHANGE UP
BUN SERPL-MCNC: 10 MG/DL — SIGNIFICANT CHANGE UP (ref 7–23)
CALCIUM SERPL-MCNC: 10.6 MG/DL — HIGH (ref 8.4–10.5)
CHLORIDE SERPL-SCNC: 104 MMOL/L — SIGNIFICANT CHANGE UP (ref 96–108)
CO2 SERPL-SCNC: 24 MMOL/L — SIGNIFICANT CHANGE UP (ref 22–31)
CREAT SERPL-MCNC: 0.59 MG/DL — SIGNIFICANT CHANGE UP (ref 0.5–1.3)
EOSINOPHIL # BLD AUTO: 0.5 K/UL — SIGNIFICANT CHANGE UP (ref 0–0.5)
EOSINOPHIL NFR BLD AUTO: 5.8 % — SIGNIFICANT CHANGE UP (ref 0–6)
FIBRINOGEN PPP-MCNC: 563 MG/DL — HIGH (ref 350–510)
GLUCOSE SERPL-MCNC: 77 MG/DL — SIGNIFICANT CHANGE UP (ref 70–99)
HCT VFR BLD CALC: 28.8 % — LOW (ref 34.5–45)
HGB BLD-MCNC: 9.5 G/DL — LOW (ref 11.5–15.5)
INR BLD: 0.96 RATIO — SIGNIFICANT CHANGE UP (ref 0.88–1.16)
LDH SERPL L TO P-CCNC: 239 U/L — SIGNIFICANT CHANGE UP (ref 50–242)
LYMPHOCYTES # BLD AUTO: 2.2 K/UL — SIGNIFICANT CHANGE UP (ref 1–3.3)
LYMPHOCYTES # BLD AUTO: 27.6 % — SIGNIFICANT CHANGE UP (ref 13–44)
MAGNESIUM SERPL-MCNC: 2.1 MG/DL — SIGNIFICANT CHANGE UP (ref 1.6–2.6)
MCHC RBC-ENTMCNC: 32.8 PG — SIGNIFICANT CHANGE UP (ref 27–34)
MCHC RBC-ENTMCNC: 32.9 GM/DL — SIGNIFICANT CHANGE UP (ref 32–36)
MCV RBC AUTO: 99.8 FL — SIGNIFICANT CHANGE UP (ref 80–100)
MONOCYTES # BLD AUTO: 1.3 K/UL — HIGH (ref 0–0.9)
MONOCYTES NFR BLD AUTO: 16 % — HIGH (ref 2–14)
NEUTROPHILS # BLD AUTO: 4 K/UL — SIGNIFICANT CHANGE UP (ref 1.8–7.4)
NEUTROPHILS NFR BLD AUTO: 49.8 % — SIGNIFICANT CHANGE UP (ref 43–77)
PHOSPHATE SERPL-MCNC: 3.7 MG/DL — SIGNIFICANT CHANGE UP (ref 2.5–4.5)
PLATELET # BLD AUTO: 76 K/UL — LOW (ref 150–400)
POTASSIUM SERPL-MCNC: 4 MMOL/L — SIGNIFICANT CHANGE UP (ref 3.5–5.3)
POTASSIUM SERPL-SCNC: 4 MMOL/L — SIGNIFICANT CHANGE UP (ref 3.5–5.3)
PROT SERPL-MCNC: 6.7 G/DL — SIGNIFICANT CHANGE UP (ref 6–8.3)
PROTHROM AB SERPL-ACNC: 11 SEC — SIGNIFICANT CHANGE UP (ref 10–12.9)
RBC # BLD: 2.89 M/UL — LOW (ref 3.8–5.2)
RBC # FLD: 20.7 % — HIGH (ref 10.3–14.5)
RH IG SCN BLD-IMP: POSITIVE — SIGNIFICANT CHANGE UP
SODIUM SERPL-SCNC: 141 MMOL/L — SIGNIFICANT CHANGE UP (ref 135–145)
URATE SERPL-MCNC: 3.3 MG/DL — SIGNIFICANT CHANGE UP (ref 2.5–7)
WBC # BLD: 8 K/UL — SIGNIFICANT CHANGE UP (ref 3.8–10.5)
WBC # FLD AUTO: 8 K/UL — SIGNIFICANT CHANGE UP (ref 3.8–10.5)

## 2018-11-19 PROCEDURE — 99232 SBSQ HOSP IP/OBS MODERATE 35: CPT | Mod: GC

## 2018-11-19 RX ORDER — OXYCODONE HYDROCHLORIDE 5 MG/1
15 TABLET ORAL
Qty: 0 | Refills: 0 | Status: DISCONTINUED | OUTPATIENT
Start: 2018-11-19 | End: 2018-11-20

## 2018-11-19 RX ORDER — PANTOPRAZOLE SODIUM 20 MG/1
1 TABLET, DELAYED RELEASE ORAL
Qty: 30 | Refills: 1 | OUTPATIENT
Start: 2018-11-19 | End: 2019-01-17

## 2018-11-19 RX ORDER — LEVETIRACETAM 250 MG/1
1 TABLET, FILM COATED ORAL
Qty: 60 | Refills: 1 | OUTPATIENT
Start: 2018-11-19 | End: 2019-01-17

## 2018-11-19 RX ORDER — IMATINIB MESYLATE 400 MG/1
1 TABLET, FILM COATED ORAL
Qty: 0 | Refills: 0 | COMMUNITY

## 2018-11-19 RX ORDER — OXYCODONE HYDROCHLORIDE 5 MG/1
1 TABLET ORAL
Qty: 40 | Refills: 0 | OUTPATIENT
Start: 2018-11-19 | End: 2018-11-23

## 2018-11-19 RX ORDER — FENTANYL CITRATE 50 UG/ML
37.5 INJECTION INTRAVENOUS
Qty: 2 | Refills: 0 | OUTPATIENT
Start: 2018-11-19 | End: 2018-11-25

## 2018-11-19 RX ADMIN — FENTANYL CITRATE 1 PATCH: 50 INJECTION INTRAVENOUS at 17:02

## 2018-11-19 RX ADMIN — FENTANYL CITRATE 1 PATCH: 50 INJECTION INTRAVENOUS at 17:10

## 2018-11-19 RX ADMIN — Medication 1 APPLICATION(S): at 05:07

## 2018-11-19 RX ADMIN — HYDROMORPHONE HYDROCHLORIDE 1.5 MILLIGRAM(S): 2 INJECTION INTRAMUSCULAR; INTRAVENOUS; SUBCUTANEOUS at 13:49

## 2018-11-19 RX ADMIN — HYDROMORPHONE HYDROCHLORIDE 1.5 MILLIGRAM(S): 2 INJECTION INTRAMUSCULAR; INTRAVENOUS; SUBCUTANEOUS at 09:06

## 2018-11-19 RX ADMIN — FENTANYL CITRATE 1 PATCH: 50 INJECTION INTRAVENOUS at 06:57

## 2018-11-19 RX ADMIN — HYDROMORPHONE HYDROCHLORIDE 1.5 MILLIGRAM(S): 2 INJECTION INTRAMUSCULAR; INTRAVENOUS; SUBCUTANEOUS at 05:35

## 2018-11-19 RX ADMIN — LEVETIRACETAM 500 MILLIGRAM(S): 250 TABLET, FILM COATED ORAL at 17:03

## 2018-11-19 RX ADMIN — FENTANYL CITRATE 1 PATCH: 50 INJECTION INTRAVENOUS at 19:33

## 2018-11-19 RX ADMIN — Medication 1 TABLET(S): at 13:16

## 2018-11-19 RX ADMIN — Medication 1 APPLICATION(S): at 17:04

## 2018-11-19 RX ADMIN — OXYCODONE HYDROCHLORIDE 15 MILLIGRAM(S): 5 TABLET ORAL at 22:01

## 2018-11-19 RX ADMIN — HYDROMORPHONE HYDROCHLORIDE 1.5 MILLIGRAM(S): 2 INJECTION INTRAMUSCULAR; INTRAVENOUS; SUBCUTANEOUS at 01:41

## 2018-11-19 RX ADMIN — HYDROMORPHONE HYDROCHLORIDE 1.5 MILLIGRAM(S): 2 INJECTION INTRAMUSCULAR; INTRAVENOUS; SUBCUTANEOUS at 01:11

## 2018-11-19 RX ADMIN — HYDROMORPHONE HYDROCHLORIDE 1.5 MILLIGRAM(S): 2 INJECTION INTRAMUSCULAR; INTRAVENOUS; SUBCUTANEOUS at 13:14

## 2018-11-19 RX ADMIN — OXYCODONE HYDROCHLORIDE 15 MILLIGRAM(S): 5 TABLET ORAL at 18:50

## 2018-11-19 RX ADMIN — LEVETIRACETAM 500 MILLIGRAM(S): 250 TABLET, FILM COATED ORAL at 05:09

## 2018-11-19 RX ADMIN — OXYCODONE HYDROCHLORIDE 15 MILLIGRAM(S): 5 TABLET ORAL at 18:09

## 2018-11-19 RX ADMIN — HYDROMORPHONE HYDROCHLORIDE 1.5 MILLIGRAM(S): 2 INJECTION INTRAMUSCULAR; INTRAVENOUS; SUBCUTANEOUS at 05:05

## 2018-11-19 RX ADMIN — PANTOPRAZOLE SODIUM 40 MILLIGRAM(S): 20 TABLET, DELAYED RELEASE ORAL at 05:07

## 2018-11-19 RX ADMIN — HYDROMORPHONE HYDROCHLORIDE 1.5 MILLIGRAM(S): 2 INJECTION INTRAMUSCULAR; INTRAVENOUS; SUBCUTANEOUS at 09:21

## 2018-11-19 NOTE — PROGRESS NOTE ADULT - PROBLEM SELECTOR PLAN 2
Patient is not neutropenic, afebrile   Discontinued  Mycamine for ppx (No azoles with Inotuzumab), and Cefepime on 11/15   Vancomycin added for BC positive for Coag Neg Staph  on 11/2   completed 14 day course IV Vanco on 11/16

## 2018-11-19 NOTE — PROGRESS NOTE ADULT - PROBLEM SELECTOR PLAN 3
Discussed the transition of IV to PO  This was causing great psychosocial distress because of the issues she has had with pain control   Discussed the opiate pharmacokinetics/dynamics, opiate rotation, pain tracking and  prn administration.  Time spent 20 min

## 2018-11-19 NOTE — PROGRESS NOTE ADULT - SUBJECTIVE AND OBJECTIVE BOX
Diagnosis: Relapsed ALL Ph (+)     Protocol/Chemo Regimen:  Cycle 2 Inotuzamab Days 1, 8 and 15    Day: 14    Pt endorsed:  intermittent headache controlled with pain medications     Review of Systems: Denies nausea, vomiting, diarrhea, chest pain, SOB     Pain scale: denies    Diet: Regular    Allergies  No Known Drug Allergies  shellfish (Nausea; Vomiting)  Intolerances      HEME/ONC MEDICATIONS  alteplase for catheter clearance 2 milliGRAM(s) Catheter once      STANDING MEDICATIONS  AQUAPHOR (petrolatum Ointment) 1 Application(s) Topical two times a day  fentaNYL   Patch  12 MICROgram(s)/Hr 1 Patch Transdermal every 72 hours  fentaNYL   Patch  25 MICROgram(s)/Hr 1 Patch Transdermal every 72 hours  influenza   Vaccine 0.5 milliLiter(s) IntraMuscular once  levETIRAcetam 500 milliGRAM(s) Oral two times a day  multivitamin 1 Tablet(s) Oral daily  pantoprazole    Tablet 40 milliGRAM(s) Oral before breakfast  sodium chloride 0.9% lock flush 20 milliLiter(s) IV Push once  sodium chloride 0.9%. 1000 milliLiter(s) IV Continuous <Continuous>      PRN MEDICATIONS  acetaminophen   Tablet .. 650 milliGRAM(s) Oral every 6 hours PRN  acetaminophen   Tablet .. 650 milliGRAM(s) Oral every 6 hours PRN  HYDROmorphone  Injectable 0.5 milliGRAM(s) IV Push every 4 hours PRN  HYDROmorphone  Injectable 1.5 milliGRAM(s) IV Push every 4 hours PRN  metoclopramide Injectable 10 milliGRAM(s) IV Push every 6 hours PRN  sodium chloride 0.9% lock flush 10 milliLiter(s) IV Push every 1 hour PRN  sodium chloride 0.9% lock flush 10 milliLiter(s) IV Push every 12 hours PRN      Vital Signs Last 24 Hrs  T(C): 36.4 (19 Nov 2018 06:02), Max: 37.2 (18 Nov 2018 13:45)  T(F): 97.5 (19 Nov 2018 06:02), Max: 98.9 (18 Nov 2018 13:45)  HR: 72 (19 Nov 2018 06:02) (70 - 85)  BP: 112/78 (19 Nov 2018 06:02) (112/78 - 156/81)  RR: 18 (19 Nov 2018 06:02) (18 - 18)  SpO2: 100% (19 Nov 2018 06:02) (97% - 100%)    PHYSICAL EXAM  General: adult in NAD  HEENT: clear oropharynx, anicteric sclera, pink conjunctiva  Neck: supple  CV: normal S1/S2 RRR  Lungs: positive air movement b/l ant lungs,clear to auscultation, no wheezes, no rales  Abdomen: soft non-tender non-distended  Ext: no clubbing cyanosis or edema  Skin: no rashes and no petechiae  Neuro: alert and oriented X 4, no focal deficits  Central Line: RUE PICC c/d/i    LABS:    Cultures:     Culture - Blood in AM (11.05.18 @ 08:42)    Specimen Source: .Blood Blood-Peripheral    Culture Results:   No growth at 5 days.      Culture - Blood in AM (11.05.18 @ 08:42)    Specimen Source: .Blood Blood-Catheter    Culture Results:   No growth at 5 days.      RADIOLOGY & ADDITIONAL STUDIES:    from: CT Head No Cont (11.15.18 @ 13:08)     IMPRESSION: Slightly disproportionate cerebellar volume loss. Right   frontal Ommaya reservoir. No change in the ventricle size compared with   the prior. No other significant pathology. No change from 11/3/2018 Diagnosis: Relapsed ALL Ph (+)     Protocol/Chemo Regimen:  Cycle 2 Inotuzamab Days 1, 8 and 15    Day: 14    Pt endorsed:  intermittent headache controlled with pain medications     Review of Systems: Denies nausea, vomiting, diarrhea, chest pain, SOB     Pain scale: denies    Diet: Regular    Allergies  No Known Drug Allergies  shellfish (Nausea; Vomiting)  Intolerances      HEME/ONC MEDICATIONS  alteplase for catheter clearance 2 milliGRAM(s) Catheter once      STANDING MEDICATIONS  AQUAPHOR (petrolatum Ointment) 1 Application(s) Topical two times a day  fentaNYL   Patch  12 MICROgram(s)/Hr 1 Patch Transdermal every 72 hours  fentaNYL   Patch  25 MICROgram(s)/Hr 1 Patch Transdermal every 72 hours  influenza   Vaccine 0.5 milliLiter(s) IntraMuscular once  levETIRAcetam 500 milliGRAM(s) Oral two times a day  multivitamin 1 Tablet(s) Oral daily  pantoprazole    Tablet 40 milliGRAM(s) Oral before breakfast  sodium chloride 0.9% lock flush 20 milliLiter(s) IV Push once  sodium chloride 0.9%. 1000 milliLiter(s) IV Continuous <Continuous>      PRN MEDICATIONS  acetaminophen   Tablet .. 650 milliGRAM(s) Oral every 6 hours PRN  acetaminophen   Tablet .. 650 milliGRAM(s) Oral every 6 hours PRN  HYDROmorphone  Injectable 0.5 milliGRAM(s) IV Push every 4 hours PRN  HYDROmorphone  Injectable 1.5 milliGRAM(s) IV Push every 4 hours PRN  metoclopramide Injectable 10 milliGRAM(s) IV Push every 6 hours PRN  sodium chloride 0.9% lock flush 10 milliLiter(s) IV Push every 1 hour PRN  sodium chloride 0.9% lock flush 10 milliLiter(s) IV Push every 12 hours PRN      Vital Signs Last 24 Hrs  T(C): 36.4 (19 Nov 2018 06:02), Max: 37.2 (18 Nov 2018 13:45)  T(F): 97.5 (19 Nov 2018 06:02), Max: 98.9 (18 Nov 2018 13:45)  HR: 72 (19 Nov 2018 06:02) (70 - 85)  BP: 112/78 (19 Nov 2018 06:02) (112/78 - 156/81)  RR: 18 (19 Nov 2018 06:02) (18 - 18)  SpO2: 100% (19 Nov 2018 06:02) (97% - 100%)    PHYSICAL EXAM  General: adult in NAD  HEENT: clear oropharynx, anicteric sclera, pink conjunctiva  Neck: supple  CV: normal S1/S2 RRR  Lungs: positive air movement b/l ant lungs,clear to auscultation, no wheezes, no rales  Abdomen: soft non-tender non-distended  Ext: no clubbing cyanosis or edema  Skin: no rashes and no petechiae  Neuro: alert and oriented X 4, no focal deficits  Central Line: RUE PICC c/d/i    LABS:    Cultures:     Culture - Blood in AM (11.05.18 @ 08:42)    Specimen Source: .Blood Blood-Peripheral    Culture Results:   No growth at 5 days.      Culture - Blood in AM (11.05.18 @ 08:42)    Specimen Source: .Blood Blood-Catheter    Culture Results:   No growth at 5 days.                          9.5    8.0   )-----------( 76       ( 19 Nov 2018 07:25 )             28.8     19 Nov 2018 07:27    141    |  104    |  10     ----------------------------<  77     4.0     |  24     |  0.59     Ca    10.6       19 Nov 2018 07:27  Phos  3.7       19 Nov 2018 07:27  Mg     2.1       19 Nov 2018 07:27    TPro  6.7    /  Alb  3.8    /  TBili  0.5    /  DBili  x      /  AST  22     /  ALT  16     /  AlkPhos  109    19 Nov 2018 07:27    PT/INR - ( 19 Nov 2018 07:28 )   PT: 11.0 sec;   INR: 0.96 ratio    PTT - ( 19 Nov 2018 07:28 )  PTT:29.4 sec    LIVER FUNCTIONS - ( 19 Nov 2018 07:27 )  Alb: 3.8 g/dL / Pro: 6.7 g/dL / ALK PHOS: 109 U/L / ALT: 16 U/L / AST: 22 U/L / GGT: x             RADIOLOGY & ADDITIONAL STUDIES:    from: CT Head No Cont (11.15.18 @ 13:08)     IMPRESSION: Slightly disproportionate cerebellar volume loss. Right   frontal Ommaya reservoir. No change in the ventricle size compared with   the prior. No other significant pathology. No change from 11/3/2018

## 2018-11-19 NOTE — PROGRESS NOTE ADULT - ATTENDING COMMENTS
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  s/p Masha-Auto on 12/16/16. Pt was on Sprycel maintenance. 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 Has completed first cycle Inotuzumab and cycle 2 Inotuzumab started 11/6. BCR/ABL mutation analysis pending.  -Feels better, headache improved- fentanyl patch helping feels pain is controlled enough for going home possibly. CT head(11/3)- SDH has nearly resolved. Repeat HCT 11/15 unchanged.  -GI bleed has resolved, Continue po Protonix.  -Now afebrile. Had been febrile on 11/2. CT c/a/p 10/16- Scattered and patchy ground glass opacities in the right upper and lower lobes, possibly infection.  Heterogeneous thickening of the endometrial cavity. BCx (11/2) from PICC growing coag negative staph, she was started on Vanco IV, plan for 14 days - finish 11/17, ID followup. repeat Cx's from PICC and peripherally NGTD.   - goal plt ct >80K, but platelets are refractory to transfusion- she has been maintaining her plt counts without transfusions last 3 days. Keeping hb >7.   - OOB, ambulation.  - pretransplant testing done  -next inotuzumab due tuesday, possibly give med on tues and discharge after, patient will discuss with her son to make sure she can be picked up on tues evening if she is doing well 57 yo F Ph(+) ALL s/p R HyperCVAD x 4 cycles IT MTX x2 s/p stem cell collection w/ Step 1(HD vp16 plus bernardino-c) regimen.  s/p Masha-Auto on 12/16/16. Pt was on Sprycel maintenance. 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 Has completed first cycle Inotuzumab and cycle 2 Inotuzumab started 11/6. BCR/ABL mutation positive  Now Inotuzumab C2 day 14    -Inotuzumab day 15 to be given tomorrow; BM bx next week -if in remission, will go to alloSCT  -CT head (11/3)- SDH has nearly resolved. Repeat HCT 11/15 unchanged.   -Now afebrile. Had been febrile on 11/2. CT c/a/p 10/16- Scattered and patchy ground glass opacities in the right upper and lower lobes, possibly infection.  Heterogeneous thickening of the endometrial cavity. BCx (11/2) from PICC growing coag negative staph, she was started on Vanco IV, plan for 14 days - finish 11/17, ID followup. repeat Cx's from PICC and peripherally NGTD.   - goal plt ct >80K, but platelets are refractory to transfusion- counts improved at this time

## 2018-11-19 NOTE — PROGRESS NOTE ADULT - PROBLEM SELECTOR PLAN 2
Sporadic headaches without neurologic changes  Fentanyl for baseline pain control  No oxy prn (see below)

## 2018-11-19 NOTE — PROGRESS NOTE ADULT - SUBJECTIVE AND OBJECTIVE BOX
Pt not in pain at the moment  Pain is well controlled right now  PRN medication makes the pain "tolerable"      It is a stabbing pain who gets relief for 4 hours  She has BM Q1-2D  She has no anorexia  She reports no dysgeusia          T(C): 36.7 (18 @ 14:21), Max: 36.8 (18 @ 17:40)  HR: 80 (18 @ 14:21) (72 - 88)  BP: 140/70 (18 @ 14:21) (112/78 - 156/81)  RR: 20 (18 @ 14:21) (18 - 20)  SpO2: 100% (18 @ 14:21) (97% - 100%)  Wt(kg): --      2018 07:  -  2018 07:00  --------------------------------------------------------  IN:    Oral Fluid: 1030 mL    sodium chloride 0.9%.: 1095 mL  Total IN: 2125 mL    OUT:    Voided: 1300 mL  Total OUT: 1300 mL    Total NET: 825 mL      2018 07:  -  2018 14:23  --------------------------------------------------------  IN:    Oral Fluid: 480 mL  Total IN: 480 mL    OUT:    Voided: 800 mL  Total OUT: 800 mL    Total NET: -320 mL           @ 07:  -   @ 07:00  --------------------------------------------------------  IN: 2125 mL / OUT: 1300 mL / NET: 825 mL     @ 07:  -   @ 14:23  --------------------------------------------------------  IN: 480 mL / OUT: 800 mL / NET: -320 mL      CAPILLARY BLOOD GLUCOSE            acetaminophen   Tablet .. 650 milliGRAM(s) Oral every 6 hours PRN  acetaminophen   Tablet .. 650 milliGRAM(s) Oral every 6 hours PRN  alteplase for catheter clearance 2 milliGRAM(s) Catheter once  AQUAPHOR (petrolatum Ointment) 1 Application(s) Topical two times a day  fentaNYL   Patch  12 MICROgram(s)/Hr 1 Patch Transdermal every 72 hours  fentaNYL   Patch  25 MICROgram(s)/Hr 1 Patch Transdermal every 72 hours  influenza   Vaccine 0.5 milliLiter(s) IntraMuscular once  levETIRAcetam 500 milliGRAM(s) Oral two times a day  metoclopramide Injectable 10 milliGRAM(s) IV Push every 6 hours PRN  multivitamin 1 Tablet(s) Oral daily  oxyCODONE    IR 15 milliGRAM(s) Oral every 3 hours PRN  pantoprazole    Tablet 40 milliGRAM(s) Oral before breakfast  sodium chloride 0.9% lock flush 10 milliLiter(s) IV Push every 1 hour PRN  sodium chloride 0.9% lock flush 10 milliLiter(s) IV Push every 12 hours PRN  sodium chloride 0.9% lock flush 20 milliLiter(s) IV Push once  sodium chloride 0.9%. 1000 milliLiter(s) IV Continuous <Continuous>              141  |  104  |  10  ----------------------------<  77  4.0   |  24  |  0.59    Ca    10.6<H>      2018 07:27  Phos  3.7       Mg     2.1         TPro  6.7  /  Alb  3.8  /  TBili  0.5  /  DBili  x   /  AST  22  /  ALT  16  /  AlkPhos  109        Procalc  BNP  ABG                          9.5    8.0   )-----------( 76       ( 2018 07:25 )             28.8   PT/INR - ( 2018 07:28 )   PT: 11.0 sec;   INR: 0.96 ratio         PTT - ( 2018 07:28 )  PTT:29.4 sec        blood and urine cultures              PERTINENT PM/SXH:   Herpes zoster  Leukemia  Clostridium difficile diarrhea  Intractable migraine with status migrainosus, unspecified migraine type  Sciatica of right side  Chronic gastritis, presence of bleeding unspecified, unspecified gastritis type  Essential hypertension    Lipoma  Elective surgical procedure  History of  delivery    FAMILY HISTORY:  No pertinent family history in first degree relatives    ITEMS NOT CHECKED ARE NOT PRESENT    SOCIAL HISTORY:   Significant other/partner:  [ ]    Children:  [X ]    Hindu/Spirituality:  Substance hx:  [ ]   Tobacco hx:  [ ]   Alcohol hx: [ ] Drug use hx: [ ]  Home Opioid hx:  [ X] percocet (I-Stop Reference No: [id number])  Living Situation: [ X]Home  [ ]Long term care  [ ]Rehab [ ]Other  Occupation:     ADVANCE DIRECTIVES:    DNR [ ]  MOLST  [ ]    Living Will  [ ]   DECISION MAKER(s):  [ ] Health Care Proxy(s)  [ ] Surrogate(s)  [ ] Guardian           Name(s) and Contact(s): Zaire Olivares (son) [phone]    BASELINE (I)ADL(s) (prior to admission):  [place]: [ ]Total  [ ] Moderate [ ]Dependent    Allergies    No Known Drug Allergies  shellfish (Nausea; Vomiting)    Intolerances    MEDICATIONS  (STANDING):  alteplase for catheter clearance 2 milliGRAM(s) Catheter once  AQUAPHOR (petrolatum Ointment) 1 Application(s) Topical two times a day  fentaNYL   Patch  12 MICROgram(s)/Hr 1 Patch Transdermal every 72 hours  fentaNYL   Patch  25 MICROgram(s)/Hr 1 Patch Transdermal every 72 hours  influenza   Vaccine 0.5 milliLiter(s) IntraMuscular once  levETIRAcetam 500 milliGRAM(s) Oral two times a day  multivitamin 1 Tablet(s) Oral daily  pantoprazole    Tablet 40 milliGRAM(s) Oral before breakfast  sodium chloride 0.9% lock flush 20 milliLiter(s) IV Push once  sodium chloride 0.9%. 1000 milliLiter(s) (50 mL/Hr) IV Continuous <Continuous>    MEDICATIONS  (PRN):  acetaminophen   Tablet .. 650 milliGRAM(s) Oral every 6 hours PRN Mild Pain (1 - 3), Moderate Pain (4 - 6)  acetaminophen   Tablet .. 650 milliGRAM(s) Oral every 6 hours PRN Temp greater or equal to 38C (100.4F)  HYDROmorphone  Injectable 0.5 milliGRAM(s) IV Push every 4 hours PRN Moderate Pain (4 - 6)  HYDROmorphone  Injectable 1.5 milliGRAM(s) IV Push every 4 hours PRN Severe Pain (7 - 10)  metoclopramide Injectable 10 milliGRAM(s) IV Push every 6 hours PRN nausea  sodium chloride 0.9% lock flush 10 milliLiter(s) IV Push every 1 hour PRN After each medication administration  sodium chloride 0.9% lock flush 10 milliLiter(s) IV Push every 12 hours PRN Lumen of catheter NOT used    PRESENT SYMPTOMS:   Source if other than patient:  [ ]Family   [ ]Team     Pain (Impact on QOL):    Location -       R head  Minimal acceptable level (0-10 scale):                    Aggravating factors -unclear, better with opioids  Quality - stabbing  Radiation -  Severity (0-10 scale) - 10/10 at worst  Timing - intermittent    PAIN AD Score:     [email or web address] (press ctrl +  left click to view)    Dyspnea:                           [ ]Mild [ ]Moderate [ ]Severe  Anxiety:                             [ ]Mild [ ]Moderate [ ]Severe  Fatigue:                             [ ]Mild [ ]Moderate [ ]Severe  Nausea:                             [ ]Mild [ ]Moderate [ ]Severe  Loss of appetite:              [ ]Mild [ ]Moderate [ ]Severe  Constipation:                    [ ]Mild [ ]Moderate [ ]Severe    Other Symptoms:  [ X]All other review of systems negative   [ ]Unable to obtain due to poor mentation     Karnofsky Performance Score/Palliative Performance Status Version 2:         %    PHYSICAL EXAM:  Vital Signs Last 24 Hrs  T(C): 36.8 (2018 14:27), Max: 36.8 (2018 10:19)  T(F): 98.3 (2018 14:27), Max: 98.3 (2018 10:19)  HR: 70 (2018 14:27) (70 - 78)  BP: 122/72 (2018 14:27) (120/70 - 148/90)  BP(mean): --  RR: 20 (2018 14:27) (16 - 20)  SpO2: 98% (2018 14:27) (96% - 98%) I&O's Summary    15 Nov 2018 07:  -  2018 07:00  --------------------------------------------------------  IN: 2119 mL / OUT: 1900 mL / NET: 219 mL    2018 07:  -  2018 16:17  --------------------------------------------------------  IN: 360 mL / OUT: 500 mL / NET: -140 mL        GENERAL:  [X ]Alert  [ ]Oriented x   [ ]Lethargic  [ ]Cachexia  [ ]Unarousable  [X ]Verbal  [ ]Non-Verbal    Behavioral:   [ ] Anxiety  [ ] Delirium [ ] Agitation [ ] Other    HEENT:  [ ]Normal   [ ]Dry mouth   [ ]ET Tube/Trach  [ ]Oral lesions    PULMONARY:   [ X]Clear [ ]Tachypnea  [ ]Audible excessive secretions   [ ]Rhonchi        [ ]Right [ ]Left [ ]Bilateral  [ ]Crackles        [ ]Right [ ]Left [ ]Bilateral  [ ]Wheezing     [ ]Right [ ]Left [ ]Bilateral    CARDIOVASCULAR:    [ X]Regular [ ]Irregular [ ]Tachy  [ ]Ellis [ ]Murmur [ ]Other    GASTROINTESTINAL:  [ X]Soft  [ ]Distended   [X ]+BS  [X ]Non tender [ ]Tender  [ ]PEG [ ]OGT/ NGT  Last BM:     GENITOURINARY:  [ ]Normal [ ] Incontinent   [ ]Oliguria/Anuria   [ ]Deng    MUSCULOSKELETAL:   [X ]Normal   [ ]Weakness  [ ]Bed/Wheelchair bound [ ]Edema    NEUROLOGIC:   [X ]No focal deficits  [ ] Cognitive impairment  [ ] Dysphagia [ ]Dysarthria [ ] Paresis [ ]Other     SKIN:   [ ]Normal   [ ]Pressure ulcer(s)  [ ]Rash    CRITICAL CARE:  [ ] Shock Present  [ ]Septic [ ]Cardiogenic [ ]Neurologic [ ]Hypovolemic  [ ]  Vasopressors [ ]  Inotropes   [ ] Respiratory failure present  [ ] Acute  [ ] Chronic [ ] Hypoxic  [ ] Hypercarbic [ ] Other  [ ] Other organ failure     LABS: reviewed                        9.2    6.9   )-----------( 68       ( 2018 07:23 )             27.6       141  |  102  |  12  ----------------------------<  93  4.2   |  27  |  0.68    Ca    10.8<H>      2018 07:23  Phos  4.1       Mg     2.1         TPro  6.9  /  Alb  4.0  /  TBili  0.7  /  DBili  x   /  AST  17  /  ALT  15  /  AlkPhos  112    PT/INR - ( 15 Nov 2018 06:55 )   PT: 11.3 sec;   INR: 0.98 ratio         PTT - ( 15 Nov 2018 06:55 )  PTT:24.2 sec      RADIOLOGY & ADDITIONAL STUDIES:  CT head 11/15  Slightly disproportionate cerebellar volume loss. Right   frontal Ommaya reservoir. No change in the ventricle size compared with   the prior. No other significant pathology. No change from 11/3/2018    PROTEIN CALORIE MALNUTRITION:   [ ] PPSV2 < or = to 30% [ ] significant weight loss  [ ] poor nutritional intake [ ] catabolic state [ ] anasarca     Albumin, Serum: 4.0 g/dL (18 @ 07:23)  Artificial Nutrition [ ]     REFERRALS:   [ ]Chaplaincy  [ ] Hospice  [ ]Child Life  [ ]Social Work  [ ]Case management [ ]Holistic Therapy     Goals of Care Discussion Document:     Alan Weinstein MD  Palliative Medicine  office: [phone] | [phone]  pager: [phone]

## 2018-11-19 NOTE — PROGRESS NOTE ADULT - PROBLEM SELECTOR PLAN 3
10/4 CT Head revealed small bilateral acute on chronic convexity subdural hematomas. Repeat CT Head on 10/5, 10/6 and 10/11 is stable with 10/16 results showing SDH decreasing in size. Repeat CT Head 10/23 with some small area of bleeding into previous collection.   Continue Keppra 500mg BID for seizure ppx    Neurosurgery following, no intervention at this time.   Repeat Head CT for worsening headaches on 11/3 and 11/15  c/w nearly resolved subdural hematomas  Palliative care consulted for pain management: started fentanyl patch 10/4 CT Head revealed small bilateral acute on chronic convexity subdural hematomas. Repeat CT Head on 10/5, 10/6 and 10/11 is stable with 10/16 results showing SDH decreasing in size. Repeat CT Head 10/23 with some small area of bleeding into previous collection.   Continue Keppra 500mg BID for seizure ppx    Neurosurgery consulted, no intervention at this time.   Repeat Head CT for worsening headaches on 11/3 and 11/15  c/w nearly resolved subdural hematomas  Palliative care consulted for pain management: started fentanyl patch, added Oxycodone IR for breakthrough.

## 2018-11-20 ENCOUNTER — OUTPATIENT (OUTPATIENT)
Dept: OUTPATIENT SERVICES | Facility: HOSPITAL | Age: 58
LOS: 1 days | Discharge: HOME | End: 2018-11-20

## 2018-11-20 VITALS
OXYGEN SATURATION: 98 % | DIASTOLIC BLOOD PRESSURE: 87 MMHG | SYSTOLIC BLOOD PRESSURE: 149 MMHG | RESPIRATION RATE: 18 BRPM | HEART RATE: 76 BPM | TEMPERATURE: 98 F

## 2018-11-20 DIAGNOSIS — Z41.9 ENCOUNTER FOR PROCEDURE FOR PURPOSES OTHER THAN REMEDYING HEALTH STATE, UNSPECIFIED: Chronic | ICD-10-CM

## 2018-11-20 DIAGNOSIS — C91.00 ACUTE LYMPHOBLASTIC LEUKEMIA NOT HAVING ACHIEVED REMISSION: ICD-10-CM

## 2018-11-20 DIAGNOSIS — Z98.89 OTHER SPECIFIED POSTPROCEDURAL STATES: Chronic | ICD-10-CM

## 2018-11-20 DIAGNOSIS — D17.9 BENIGN LIPOMATOUS NEOPLASM, UNSPECIFIED: Chronic | ICD-10-CM

## 2018-11-20 PROBLEM — B02.9 ZOSTER WITHOUT COMPLICATIONS: Chronic | Status: ACTIVE | Noted: 2018-10-05

## 2018-11-20 LAB
ALBUMIN SERPL ELPH-MCNC: 3.7 G/DL — SIGNIFICANT CHANGE UP (ref 3.3–5)
ALP SERPL-CCNC: 102 U/L — SIGNIFICANT CHANGE UP (ref 40–120)
ALT FLD-CCNC: 15 U/L — SIGNIFICANT CHANGE UP (ref 10–45)
ANION GAP SERPL CALC-SCNC: 12 MMOL/L — SIGNIFICANT CHANGE UP (ref 5–17)
AST SERPL-CCNC: 21 U/L — SIGNIFICANT CHANGE UP (ref 10–40)
BASOPHILS # BLD AUTO: 0 K/UL — SIGNIFICANT CHANGE UP (ref 0–0.2)
BASOPHILS NFR BLD AUTO: 0.7 % — SIGNIFICANT CHANGE UP (ref 0–2)
BILIRUB SERPL-MCNC: 0.5 MG/DL — SIGNIFICANT CHANGE UP (ref 0.2–1.2)
BUN SERPL-MCNC: 10 MG/DL — SIGNIFICANT CHANGE UP (ref 7–23)
CALCIUM SERPL-MCNC: 10.8 MG/DL — HIGH (ref 8.4–10.5)
CHLORIDE SERPL-SCNC: 106 MMOL/L — SIGNIFICANT CHANGE UP (ref 96–108)
CO2 SERPL-SCNC: 24 MMOL/L — SIGNIFICANT CHANGE UP (ref 22–31)
CREAT SERPL-MCNC: 0.65 MG/DL — SIGNIFICANT CHANGE UP (ref 0.5–1.3)
EOSINOPHIL # BLD AUTO: 0.3 K/UL — SIGNIFICANT CHANGE UP (ref 0–0.5)
EOSINOPHIL NFR BLD AUTO: 4.5 % — SIGNIFICANT CHANGE UP (ref 0–6)
GLUCOSE SERPL-MCNC: 98 MG/DL — SIGNIFICANT CHANGE UP (ref 70–99)
HCT VFR BLD CALC: 28 % — LOW (ref 34.5–45)
HGB BLD-MCNC: 9.1 G/DL — LOW (ref 11.5–15.5)
LDH SERPL L TO P-CCNC: 230 U/L — SIGNIFICANT CHANGE UP (ref 50–242)
LYMPHOCYTES # BLD AUTO: 1.8 K/UL — SIGNIFICANT CHANGE UP (ref 1–3.3)
LYMPHOCYTES # BLD AUTO: 30 % — SIGNIFICANT CHANGE UP (ref 13–44)
MAGNESIUM SERPL-MCNC: 2.1 MG/DL — SIGNIFICANT CHANGE UP (ref 1.6–2.6)
MCHC RBC-ENTMCNC: 32.4 GM/DL — SIGNIFICANT CHANGE UP (ref 32–36)
MCHC RBC-ENTMCNC: 32.5 PG — SIGNIFICANT CHANGE UP (ref 27–34)
MCV RBC AUTO: 100 FL — SIGNIFICANT CHANGE UP (ref 80–100)
MONOCYTES # BLD AUTO: 0.9 K/UL — SIGNIFICANT CHANGE UP (ref 0–0.9)
MONOCYTES NFR BLD AUTO: 14.9 % — HIGH (ref 2–14)
NEUTROPHILS # BLD AUTO: 3 K/UL — SIGNIFICANT CHANGE UP (ref 1.8–7.4)
NEUTROPHILS NFR BLD AUTO: 49.9 % — SIGNIFICANT CHANGE UP (ref 43–77)
PHOSPHATE SERPL-MCNC: 4.1 MG/DL — SIGNIFICANT CHANGE UP (ref 2.5–4.5)
PLATELET # BLD AUTO: 66 K/UL — LOW (ref 150–400)
POTASSIUM SERPL-MCNC: 3.8 MMOL/L — SIGNIFICANT CHANGE UP (ref 3.5–5.3)
POTASSIUM SERPL-SCNC: 3.8 MMOL/L — SIGNIFICANT CHANGE UP (ref 3.5–5.3)
PROT SERPL-MCNC: 6.5 G/DL — SIGNIFICANT CHANGE UP (ref 6–8.3)
RBC # BLD: 2.79 M/UL — LOW (ref 3.8–5.2)
RBC # FLD: 20.6 % — HIGH (ref 10.3–14.5)
SODIUM SERPL-SCNC: 142 MMOL/L — SIGNIFICANT CHANGE UP (ref 135–145)
URATE SERPL-MCNC: 3.3 MG/DL — SIGNIFICANT CHANGE UP (ref 2.5–7)
WBC # BLD: 6 K/UL — SIGNIFICANT CHANGE UP (ref 3.8–10.5)
WBC # FLD AUTO: 6 K/UL — SIGNIFICANT CHANGE UP (ref 3.8–10.5)

## 2018-11-20 PROCEDURE — 85014 HEMATOCRIT: CPT

## 2018-11-20 PROCEDURE — 87086 URINE CULTURE/COLONY COUNT: CPT

## 2018-11-20 PROCEDURE — C8929: CPT

## 2018-11-20 PROCEDURE — 80202 ASSAY OF VANCOMYCIN: CPT

## 2018-11-20 PROCEDURE — 82330 ASSAY OF CALCIUM: CPT

## 2018-11-20 PROCEDURE — 84100 ASSAY OF PHOSPHORUS: CPT

## 2018-11-20 PROCEDURE — 85652 RBC SED RATE AUTOMATED: CPT

## 2018-11-20 PROCEDURE — 86870 RBC ANTIBODY IDENTIFICATION: CPT

## 2018-11-20 PROCEDURE — 85027 COMPLETE CBC AUTOMATED: CPT

## 2018-11-20 PROCEDURE — 84550 ASSAY OF BLOOD/URIC ACID: CPT

## 2018-11-20 PROCEDURE — 82310 ASSAY OF CALCIUM: CPT

## 2018-11-20 PROCEDURE — 82607 VITAMIN B-12: CPT

## 2018-11-20 PROCEDURE — 74177 CT ABD & PELVIS W/CONTRAST: CPT

## 2018-11-20 PROCEDURE — 99231 SBSQ HOSP IP/OBS SF/LOW 25: CPT | Mod: GC

## 2018-11-20 PROCEDURE — 81001 URINALYSIS AUTO W/SCOPE: CPT

## 2018-11-20 PROCEDURE — 83036 HEMOGLOBIN GLYCOSYLATED A1C: CPT

## 2018-11-20 PROCEDURE — 83550 IRON BINDING TEST: CPT

## 2018-11-20 PROCEDURE — 93005 ELECTROCARDIOGRAM TRACING: CPT

## 2018-11-20 PROCEDURE — 87040 BLOOD CULTURE FOR BACTERIA: CPT

## 2018-11-20 PROCEDURE — 70220 X-RAY EXAM OF SINUSES: CPT

## 2018-11-20 PROCEDURE — 88264 CHROMOSOME ANALYSIS 20-25: CPT

## 2018-11-20 PROCEDURE — 97162 PT EVAL MOD COMPLEX 30 MIN: CPT

## 2018-11-20 PROCEDURE — 86850 RBC ANTIBODY SCREEN: CPT

## 2018-11-20 PROCEDURE — 82570 ASSAY OF URINE CREATININE: CPT

## 2018-11-20 PROCEDURE — 96375 TX/PRO/DX INJ NEW DRUG ADDON: CPT

## 2018-11-20 PROCEDURE — 88184 FLOWCYTOMETRY/ TC 1 MARKER: CPT

## 2018-11-20 PROCEDURE — C1751: CPT

## 2018-11-20 PROCEDURE — 77001 FLUOROGUIDE FOR VEIN DEVICE: CPT

## 2018-11-20 PROCEDURE — 86813 HLA TYPING A B OR C: CPT

## 2018-11-20 PROCEDURE — 90686 IIV4 VACC NO PRSV 0.5 ML IM: CPT

## 2018-11-20 PROCEDURE — 86480 TB TEST CELL IMMUN MEASURE: CPT

## 2018-11-20 PROCEDURE — 97110 THERAPEUTIC EXERCISES: CPT

## 2018-11-20 PROCEDURE — 83735 ASSAY OF MAGNESIUM: CPT

## 2018-11-20 PROCEDURE — 85049 AUTOMATED PLATELET COUNT: CPT

## 2018-11-20 PROCEDURE — 87150 DNA/RNA AMPLIFIED PROBE: CPT

## 2018-11-20 PROCEDURE — 85384 FIBRINOGEN ACTIVITY: CPT

## 2018-11-20 PROCEDURE — 82803 BLOOD GASES ANY COMBINATION: CPT

## 2018-11-20 PROCEDURE — 82652 VIT D 1 25-DIHYDROXY: CPT

## 2018-11-20 PROCEDURE — 86922 COMPATIBILITY TEST ANTIGLOB: CPT

## 2018-11-20 PROCEDURE — 78472 GATED HEART PLANAR SINGLE: CPT

## 2018-11-20 PROCEDURE — 83605 ASSAY OF LACTIC ACID: CPT

## 2018-11-20 PROCEDURE — 82435 ASSAY OF BLOOD CHLORIDE: CPT

## 2018-11-20 PROCEDURE — P9037: CPT

## 2018-11-20 PROCEDURE — 96374 THER/PROPH/DIAG INJ IV PUSH: CPT

## 2018-11-20 PROCEDURE — 86923 COMPATIBILITY TEST ELECTRIC: CPT

## 2018-11-20 PROCEDURE — 99285 EMERGENCY DEPT VISIT HI MDM: CPT | Mod: 25

## 2018-11-20 PROCEDURE — 85045 AUTOMATED RETICULOCYTE COUNT: CPT

## 2018-11-20 PROCEDURE — 83615 LACTATE (LD) (LDH) ENZYME: CPT

## 2018-11-20 PROCEDURE — 76937 US GUIDE VASCULAR ACCESS: CPT

## 2018-11-20 PROCEDURE — 88237 TISSUE CULTURE BONE MARROW: CPT

## 2018-11-20 PROCEDURE — 86880 COOMBS TEST DIRECT: CPT

## 2018-11-20 PROCEDURE — 86902 BLOOD TYPE ANTIGEN DONOR EA: CPT

## 2018-11-20 PROCEDURE — 80053 COMPREHEN METABOLIC PANEL: CPT

## 2018-11-20 PROCEDURE — 87389 HIV-1 AG W/HIV-1&-2 AB AG IA: CPT

## 2018-11-20 PROCEDURE — 84132 ASSAY OF SERUM POTASSIUM: CPT

## 2018-11-20 PROCEDURE — P9011: CPT

## 2018-11-20 PROCEDURE — 80074 ACUTE HEPATITIS PANEL: CPT

## 2018-11-20 PROCEDURE — 82947 ASSAY GLUCOSE BLOOD QUANT: CPT

## 2018-11-20 PROCEDURE — 84295 ASSAY OF SERUM SODIUM: CPT

## 2018-11-20 PROCEDURE — 81206 BCR/ABL1 GENE MAJOR BP: CPT

## 2018-11-20 PROCEDURE — 86860 RBC ANTIBODY ELUTION: CPT

## 2018-11-20 PROCEDURE — 71260 CT THORAX DX C+: CPT

## 2018-11-20 PROCEDURE — 85730 THROMBOPLASTIN TIME PARTIAL: CPT

## 2018-11-20 PROCEDURE — A9560: CPT

## 2018-11-20 PROCEDURE — P9040: CPT

## 2018-11-20 PROCEDURE — 36569 INSJ PICC 5 YR+ W/O IMAGING: CPT

## 2018-11-20 PROCEDURE — 71045 X-RAY EXAM CHEST 1 VIEW: CPT

## 2018-11-20 PROCEDURE — 70450 CT HEAD/BRAIN W/O DYE: CPT

## 2018-11-20 PROCEDURE — 88275 CYTOGENETICS 100-300: CPT

## 2018-11-20 PROCEDURE — 82746 ASSAY OF FOLIC ACID SERUM: CPT

## 2018-11-20 PROCEDURE — 82962 GLUCOSE BLOOD TEST: CPT

## 2018-11-20 PROCEDURE — 83010 ASSAY OF HAPTOGLOBIN QUANT: CPT

## 2018-11-20 PROCEDURE — 83970 ASSAY OF PARATHORMONE: CPT

## 2018-11-20 PROCEDURE — 86901 BLOOD TYPING SEROLOGIC RH(D): CPT

## 2018-11-20 PROCEDURE — 97116 GAIT TRAINING THERAPY: CPT

## 2018-11-20 PROCEDURE — 88271 CYTOGENETICS DNA PROBE: CPT

## 2018-11-20 PROCEDURE — 86905 BLOOD TYPING RBC ANTIGENS: CPT

## 2018-11-20 PROCEDURE — 85610 PROTHROMBIN TIME: CPT

## 2018-11-20 PROCEDURE — 86900 BLOOD TYPING SEROLOGIC ABO: CPT

## 2018-11-20 PROCEDURE — 36430 TRANSFUSION BLD/BLD COMPNT: CPT

## 2018-11-20 PROCEDURE — 88280 CHROMOSOME KARYOTYPE STUDY: CPT

## 2018-11-20 PROCEDURE — 74176 CT ABD & PELVIS W/O CONTRAST: CPT

## 2018-11-20 PROCEDURE — 87449 NOS EACH ORGANISM AG IA: CPT

## 2018-11-20 PROCEDURE — 82728 ASSAY OF FERRITIN: CPT

## 2018-11-20 PROCEDURE — 88185 FLOWCYTOMETRY/TC ADD-ON: CPT

## 2018-11-20 RX ORDER — HYDROCORTISONE 20 MG
100 TABLET ORAL ONCE
Qty: 0 | Refills: 0 | Status: COMPLETED | OUTPATIENT
Start: 2018-11-20 | End: 2018-11-20

## 2018-11-20 RX ORDER — FENTANYL CITRATE 50 UG/ML
1 INJECTION INTRAVENOUS
Qty: 10 | Refills: 0 | OUTPATIENT
Start: 2018-11-20 | End: 2018-12-19

## 2018-11-20 RX ORDER — DIPHENHYDRAMINE HCL 50 MG
50 CAPSULE ORAL ONCE
Qty: 0 | Refills: 0 | Status: COMPLETED | OUTPATIENT
Start: 2018-11-20 | End: 2018-11-20

## 2018-11-20 RX ORDER — ACETAMINOPHEN 500 MG
650 TABLET ORAL ONCE
Qty: 0 | Refills: 0 | Status: COMPLETED | OUTPATIENT
Start: 2018-11-20 | End: 2018-11-20

## 2018-11-20 RX ORDER — INOTUZUMAB OZOGAMICIN 0.25 MG/ML
1.06 INJECTION, POWDER, LYOPHILIZED, FOR SOLUTION INTRAVENOUS ONCE
Qty: 0 | Refills: 0 | Status: COMPLETED | OUTPATIENT
Start: 2018-11-20 | End: 2018-11-20

## 2018-11-20 RX ADMIN — FENTANYL CITRATE 1 PATCH: 50 INJECTION INTRAVENOUS at 05:25

## 2018-11-20 RX ADMIN — Medication 650 MILLIGRAM(S): at 12:01

## 2018-11-20 RX ADMIN — Medication 1 APPLICATION(S): at 05:19

## 2018-11-20 RX ADMIN — OXYCODONE HYDROCHLORIDE 15 MILLIGRAM(S): 5 TABLET ORAL at 05:18

## 2018-11-20 RX ADMIN — OXYCODONE HYDROCHLORIDE 15 MILLIGRAM(S): 5 TABLET ORAL at 01:04

## 2018-11-20 RX ADMIN — OXYCODONE HYDROCHLORIDE 15 MILLIGRAM(S): 5 TABLET ORAL at 11:20

## 2018-11-20 RX ADMIN — Medication 1 TABLET(S): at 15:45

## 2018-11-20 RX ADMIN — INOTUZUMAB OZOGAMICIN 50 MILLIGRAM(S): 0.25 INJECTION, POWDER, LYOPHILIZED, FOR SOLUTION INTRAVENOUS at 13:15

## 2018-11-20 RX ADMIN — Medication 50 MILLIGRAM(S): at 12:02

## 2018-11-20 RX ADMIN — Medication 100 MILLIGRAM(S): at 12:02

## 2018-11-20 RX ADMIN — PANTOPRAZOLE SODIUM 40 MILLIGRAM(S): 20 TABLET, DELAYED RELEASE ORAL at 05:18

## 2018-11-20 RX ADMIN — LEVETIRACETAM 500 MILLIGRAM(S): 250 TABLET, FILM COATED ORAL at 05:18

## 2018-11-20 RX ADMIN — SODIUM CHLORIDE 50 MILLILITER(S): 9 INJECTION INTRAMUSCULAR; INTRAVENOUS; SUBCUTANEOUS at 05:25

## 2018-11-20 RX ADMIN — INFLUENZA VIRUS VACCINE 0.5 MILLILITER(S): 15; 15; 15; 15 SUSPENSION INTRAMUSCULAR at 15:49

## 2018-11-20 RX ADMIN — FENTANYL CITRATE 1 PATCH: 50 INJECTION INTRAVENOUS at 05:26

## 2018-11-20 RX ADMIN — OXYCODONE HYDROCHLORIDE 15 MILLIGRAM(S): 5 TABLET ORAL at 10:54

## 2018-11-20 NOTE — PROGRESS NOTE ADULT - I WAS PHYSICALLY PRESENT FOR THE KEY PORTIONS OF THE EVALUATION AND MANAGEMENT (E/M) SERVICE PROVIDED.  I AGREE WITH THE ABOVE HISTORY, PHYSICAL, AND PLAN WHICH I HAVE REVIEWED AND EDITED WHERE APPROPRIATE

## 2018-11-20 NOTE — PROGRESS NOTE ADULT - NSHPATTENDINGPLANDISCUSS_GEN_ALL_CORE
team
Chriss Attending
Heme NP
team

## 2018-11-20 NOTE — PROGRESS NOTE ADULT - SUBJECTIVE AND OBJECTIVE BOX
Ample relief on Oxy  She reports prior use  Lamented the previous consideration of dilaudid  Happy with PO transition          T(C): 36.8 (18 @ 13:27), Max: 37 (18 @ 20:50)  HR: 76 (18 @ 13:27) (67 - 83)  BP: 149/87 (18 @ 13:27) (130/74 - 149/87)  RR: 18 (18 @ 13:27) (18 - 18)  SpO2: 98% (18 @ 13:27) (98% - 100%)  Wt(kg): --      2018 07:  -  2018 07:00  --------------------------------------------------------  IN:    Oral Fluid: 1140 mL    sodium chloride 0.9%.: 1215 mL  Total IN: 2355 mL    OUT:    Voided: 2200 mL  Total OUT: 2200 mL    Total NET: 155 mL      2018 07:  -  2018 15:11  --------------------------------------------------------  IN:    Oral Fluid: 600 mL  Total IN: 600 mL    OUT:    Voided: 800 mL  Total OUT: 800 mL    Total NET: -200 mL           @ 07:  -   @ 07:00  --------------------------------------------------------  IN: 2355 mL / OUT: 2200 mL / NET: 155 mL     @ 07:  -   @ 15:11  --------------------------------------------------------  IN: 600 mL / OUT: 800 mL / NET: -200 mL      CAPILLARY BLOOD GLUCOSE            acetaminophen   Tablet .. 650 milliGRAM(s) Oral every 6 hours PRN  acetaminophen   Tablet .. 650 milliGRAM(s) Oral every 6 hours PRN  alteplase for catheter clearance 2 milliGRAM(s) Catheter once  AQUAPHOR (petrolatum Ointment) 1 Application(s) Topical two times a day  fentaNYL   Patch  12 MICROgram(s)/Hr 1 Patch Transdermal every 72 hours  fentaNYL   Patch  25 MICROgram(s)/Hr 1 Patch Transdermal every 72 hours  influenza   Vaccine 0.5 milliLiter(s) IntraMuscular once  levETIRAcetam 500 milliGRAM(s) Oral two times a day  metoclopramide Injectable 10 milliGRAM(s) IV Push every 6 hours PRN  multivitamin 1 Tablet(s) Oral daily  oxyCODONE    IR 15 milliGRAM(s) Oral every 3 hours PRN  pantoprazole    Tablet 40 milliGRAM(s) Oral before breakfast  sodium chloride 0.9% lock flush 10 milliLiter(s) IV Push every 1 hour PRN  sodium chloride 0.9% lock flush 10 milliLiter(s) IV Push every 12 hours PRN  sodium chloride 0.9% lock flush 20 milliLiter(s) IV Push once  sodium chloride 0.9%. 1000 milliLiter(s) IV Continuous <Continuous>          11    142  |  106  |  10  ----------------------------<  98  3.8   |  24  |  0.65    Ca    10.8<H>      2018 07:07  Phos  4.1       Mg     2.1         TPro  6.5  /  Alb  3.7  /  TBili  0.5  /  DBili  x   /  AST  21  /  ALT  15  /  AlkPhos  102  20      Procalc  BNP  ABG                          9.1    6.0   )-----------( 66       ( 2018 07:05 )             28.0   PT/INR - ( 2018 07:28 )   PT: 11.0 sec;   INR: 0.96 ratio         PTT - ( 2018 07:28 )  PTT:29.4 sec        blood and urine cultures                          PERTINENT PM/SXH:   Herpes zoster  Leukemia  Clostridium difficile diarrhea  Intractable migraine with status migrainosus, unspecified migraine type  Sciatica of right side  Chronic gastritis, presence of bleeding unspecified, unspecified gastritis type  Essential hypertension    Lipoma  Elective surgical procedure  History of  delivery    FAMILY HISTORY:  No pertinent family history in first degree relatives    ITEMS NOT CHECKED ARE NOT PRESENT    SOCIAL HISTORY:   Significant other/partner:  [ ]    Children:  [X ]    Pentecostalism/Spirituality:  Substance hx:  [ ]   Tobacco hx:  [ ]   Alcohol hx: [ ] Drug use hx: [ ]  Home Opioid hx:  [ X] percocet (I-Stop Reference No: [id number])  Living Situation: [ X]Home  [ ]Long term care  [ ]Rehab [ ]Other  Occupation:     ADVANCE DIRECTIVES:    DNR [ ]  MOLST  [ ]    Living Will  [ ]   DECISION MAKER(s):  [ ] Health Care Proxy(s)  [ ] Surrogate(s)  [ ] Guardian           Name(s) and Contact(s): Zaire Olivares (son) [phone]    BASELINE (I)ADL(s) (prior to admission):  [place]: [ ]Total  [ ] Moderate [ ]Dependent    Allergies    No Known Drug Allergies  shellfish (Nausea; Vomiting)    Intolerances    MEDICATIONS  (STANDING):  alteplase for catheter clearance 2 milliGRAM(s) Catheter once  AQUAPHOR (petrolatum Ointment) 1 Application(s) Topical two times a day  fentaNYL   Patch  12 MICROgram(s)/Hr 1 Patch Transdermal every 72 hours  fentaNYL   Patch  25 MICROgram(s)/Hr 1 Patch Transdermal every 72 hours  influenza   Vaccine 0.5 milliLiter(s) IntraMuscular once  levETIRAcetam 500 milliGRAM(s) Oral two times a day  multivitamin 1 Tablet(s) Oral daily  pantoprazole    Tablet 40 milliGRAM(s) Oral before breakfast  sodium chloride 0.9% lock flush 20 milliLiter(s) IV Push once  sodium chloride 0.9%. 1000 milliLiter(s) (50 mL/Hr) IV Continuous <Continuous>    MEDICATIONS  (PRN):  acetaminophen   Tablet .. 650 milliGRAM(s) Oral every 6 hours PRN Mild Pain (1 - 3), Moderate Pain (4 - 6)  acetaminophen   Tablet .. 650 milliGRAM(s) Oral every 6 hours PRN Temp greater or equal to 38C (100.4F)  HYDROmorphone  Injectable 0.5 milliGRAM(s) IV Push every 4 hours PRN Moderate Pain (4 - 6)  HYDROmorphone  Injectable 1.5 milliGRAM(s) IV Push every 4 hours PRN Severe Pain (7 - 10)  metoclopramide Injectable 10 milliGRAM(s) IV Push every 6 hours PRN nausea  sodium chloride 0.9% lock flush 10 milliLiter(s) IV Push every 1 hour PRN After each medication administration  sodium chloride 0.9% lock flush 10 milliLiter(s) IV Push every 12 hours PRN Lumen of catheter NOT used    PRESENT SYMPTOMS:   Source if other than patient:  [ ]Family   [ ]Team     Pain (Impact on QOL):    Location -       R head  Minimal acceptable level (0-10 scale):                    Aggravating factors -unclear, better with opioids  Quality - stabbing  Radiation -  Severity (0-10 scale) - 10/10 at worst  Timing - intermittent    PAIN AD Score:     [email or web address] (press ctrl +  left click to view)    Dyspnea:                           [ ]Mild [ ]Moderate [ ]Severe  Anxiety:                             [ ]Mild [ ]Moderate [ ]Severe  Fatigue:                             [ ]Mild [ ]Moderate [ ]Severe  Nausea:                             [ ]Mild [ ]Moderate [ ]Severe  Loss of appetite:              [ ]Mild [ ]Moderate [ ]Severe  Constipation:                    [ ]Mild [ ]Moderate [ ]Severe    Other Symptoms:  [ X]All other review of systems negative   [ ]Unable to obtain due to poor mentation     Karnofsky Performance Score/Palliative Performance Status Version 2:         %    PHYSICAL EXAM:  Vital Signs Last 24 Hrs  T(C): 36.8 (2018 14:27), Max: 36.8 (2018 10:19)  T(F): 98.3 (2018 14:27), Max: 98.3 (2018 10:19)  HR: 70 (2018 14:27) (70 - 78)  BP: 122/72 (2018 14:27) (120/70 - 148/90)  BP(mean): --  RR: 20 (2018 14:27) (16 - 20)  SpO2: 98% (2018 14:27) (96% - 98%) I&O's Summary    15 Nov 2018 07:01  -  2018 07:00  --------------------------------------------------------  IN: 2119 mL / OUT: 1900 mL / NET: 219 mL    2018 07:01  -  2018 16:17  --------------------------------------------------------  IN: 360 mL / OUT: 500 mL / NET: -140 mL        GENERAL:  [X ]Alert  [ ]Oriented x   [ ]Lethargic  [ ]Cachexia  [ ]Unarousable  [X ]Verbal  [ ]Non-Verbal    Behavioral:   [ ] Anxiety  [ ] Delirium [ ] Agitation [ ] Other    HEENT:  [ ]Normal   [ ]Dry mouth   [ ]ET Tube/Trach  [ ]Oral lesions    PULMONARY:   [ X]Clear [ ]Tachypnea  [ ]Audible excessive secretions   [ ]Rhonchi        [ ]Right [ ]Left [ ]Bilateral  [ ]Crackles        [ ]Right [ ]Left [ ]Bilateral  [ ]Wheezing     [ ]Right [ ]Left [ ]Bilateral    CARDIOVASCULAR:    [ X]Regular [ ]Irregular [ ]Tachy  [ ]Ellis [ ]Murmur [ ]Other    GASTROINTESTINAL:  [ X]Soft  [ ]Distended   [X ]+BS  [X ]Non tender [ ]Tender  [ ]PEG [ ]OGT/ NGT  Last BM:     GENITOURINARY:  [ ]Normal [ ] Incontinent   [ ]Oliguria/Anuria   [ ]Deng    MUSCULOSKELETAL:   [X ]Normal   [ ]Weakness  [ ]Bed/Wheelchair bound [ ]Edema    NEUROLOGIC:   [X ]No focal deficits  [ ] Cognitive impairment  [ ] Dysphagia [ ]Dysarthria [ ] Paresis [ ]Other     SKIN:   [ ]Normal   [ ]Pressure ulcer(s)  [ ]Rash    CRITICAL CARE:  [ ] Shock Present  [ ]Septic [ ]Cardiogenic [ ]Neurologic [ ]Hypovolemic  [ ]  Vasopressors [ ]  Inotropes   [ ] Respiratory failure present  [ ] Acute  [ ] Chronic [ ] Hypoxic  [ ] Hypercarbic [ ] Other  [ ] Other organ failure     LABS: reviewed                        9.2    6.9   )-----------( 68       ( 2018 07:23 )             27.6       141  |  102  |  12  ----------------------------<  93  4.2   |  27  |  0.68    Ca    10.8<H>      2018 07:23  Phos  4.1       Mg     2.1         TPro  6.9  /  Alb  4.0  /  TBili  0.7  /  DBili  x   /  AST  17  /  ALT  15  /  AlkPhos  112    PT/INR - ( 15 Nov 2018 06:55 )   PT: 11.3 sec;   INR: 0.98 ratio         PTT - ( 15 Nov 2018 06:55 )  PTT:24.2 sec      RADIOLOGY & ADDITIONAL STUDIES:  CT head 11/15  Slightly disproportionate cerebellar volume loss. Right   frontal Ommaya reservoir. No change in the ventricle size compared with   the prior. No other significant pathology. No change from 11/3/2018    PROTEIN CALORIE MALNUTRITION:   [ ] PPSV2 < or = to 30% [ ] significant weight loss  [ ] poor nutritional intake [ ] catabolic state [ ] anasarca     Albumin, Serum: 4.0 g/dL (18 @ 07:23)  Artificial Nutrition [ ]     REFERRALS:   [ ]Chaplaincy  [ ] Hospice  [ ]Child Life  [ ]Social Work  [ ]Case management [ ]Holistic Therapy     Goals of Care Discussion Document:     Alan Weinstein MD  Palliative Medicine  office: [phone] | [phone]  pager: [phone]

## 2018-11-20 NOTE — PROGRESS NOTE ADULT - PROBLEM SELECTOR PROBLEM 3
3dMale, born at  40 weeks gestation via repeat c/s with vacuum assist to a 35 year old, , A+ mother. RI, RPR NR, HIV NR, HbSAg neg, GBS positive. No labor. No maternal temp. Maternal hx significant for Depression/Anxiety, GERD, mild asthma as child, Hx SAB, Apgar 9/9, Birth Wt: 8#9 (3880g)   Length: 20.5 in   HC: 37 cm Mother plans to breast and bottle feed. Due to void, Due to stool VSS Transitioned well to NBN.    Overnight: voiding, stooling and feeding well. VSS.  Passed OAE-right, ABR-left. Tc bili@36 HOL-5.6. [place]-[id number]. CCHD-99/100.  BW- 8lb 9oz, TW- 8lb 1oz, up 2 oz, loss 6%    PE:  active, well perfused, strong cry  AFOF, nl sutures, no cleft, nl ears and eyes, + red reflex, no cleft  chest symmetric, lungs CTA, no retractions  Heart RR, no murmur, nl pulses  Abd soft NT/ND, no masses, cord intact  Skin pink, no rashes  Gent nl male, anus patent, no dimple, testes palpable b/l  Ext FROM, no deformity, hips stable b/l, no hip click  neuro active, nl tone, nl reflexes
SDH (subdural hematoma)
Pain
SDH (subdural hematoma)
Pain
SDH (subdural hematoma)
SDH (subdural hematoma)

## 2018-11-20 NOTE — PROGRESS NOTE ADULT - PROBLEM SELECTOR PLAN 2
Sporadic headaches without neurologic changes  Fentanyl for baseline pain control  oxy prn is very helpful  Consider outpatient neuro clinic for post SDH care

## 2018-11-20 NOTE — PROGRESS NOTE ADULT - ATTENDING COMMENTS
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  s/p Masha-Auto on 12/16/16. Pt was on Sprycel maintenance. 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 Has completed first cycle Inotuzumab and cycle 2 Inotuzumab started 11/6. BCR/ABL mutation positive  Now Inotuzumab C2 day 14    -Inotuzumab day 15 to be given tomorrow; BM bx next week -if in remission, will go to alloSCT  -CT head (11/3)- SDH has nearly resolved. Repeat HCT 11/15 unchanged.   -Now afebrile. Had been febrile on 11/2. CT c/a/p 10/16- Scattered and patchy ground glass opacities in the right upper and lower lobes, possibly infection.  Heterogeneous thickening of the endometrial cavity. BCx (11/2) from PICC growing coag negative staph, she was started on Vanco IV, plan for 14 days - finish 11/17, ID followup. repeat Cx's from PICC and peripherally NGTD.   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  s/p Masha-Auto on 12/16/16. Pt was on Sprycel maintenance. 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 Has completed first cycle Inotuzumab and cycle 2 Inotuzumab started 11/6. BCR/ABL mutation positive  Now Inotuzumab C2 day 15    -Inotuzumab day 15 to done today; BM bx next week -if in remission, will go to alloSCT  -CT head (11/3)- SDH has nearly resolved. Repeat HCT 11/15 unchanged.   -Now afebrile. Had been febrile on 11/2. CT c/a/p 10/16- Scattered and patchy ground glass opacities in the right upper and lower lobes, possibly infection.  Heterogeneous thickening of the endometrial cavity. BCx (11/2) from PICC growing coag negative staph, she was started on Vanco IV, plan for 14 days - finished 11/17, ID followup.   - goal plt ct >80K, but platelets are refractory to transfusion-counts improved at this time  -d/c planning today after end of chemotherapy

## 2018-11-20 NOTE — PROGRESS NOTE ADULT - PROBLEM SELECTOR PLAN 3
10/4 CT Head revealed small bilateral acute on chronic convexity subdural hematomas. Repeat CT Head on 10/5, 10/6 and 10/11 is stable with 10/16 results showing SDH decreasing in size. Repeat CT Head 10/23 with some small area of bleeding into previous collection.   Continue Keppra 500mg BID for seizure ppx    Neurosurgery consulted, no intervention at this time.   Repeat Head CT for worsening headaches on 11/3 and 11/15  c/w nearly resolved subdural hematomas  Palliative care consulted for pain management: started fentanyl patch, added Oxycodone IR for breakthrough.

## 2018-11-20 NOTE — PROGRESS NOTE ADULT - SUBJECTIVE AND OBJECTIVE BOX
Diagnosis: Relapsed ALL Ph (+)     Protocol/Chemo Regimen: Cycle 2 Inotuzamab Days 1, 8 and 15    Day: 15    Pt endorsed:   intermittent headache controlled with pain medications     Review of Systems:  Denies nausea, vomiting, diarrhea, chest pain, SOB     Pain scale: Denies     Diet: Regular     Allergies    No Known Drug Allergies  shellfish (Nausea; Vomiting)    Intolerances        ANTIMICROBIALS      HEME/ONC MEDICATIONS  alteplase for catheter clearance 2 milliGRAM(s) Catheter once  inotuzumab ozogamicin IVPB (eMAR) 1.06 milliGRAM(s) IV Intermittent once      STANDING MEDICATIONS  acetaminophen   Tablet .. 650 milliGRAM(s) Oral once  AQUAPHOR (petrolatum Ointment) 1 Application(s) Topical two times a day  diphenhydrAMINE   Injectable 50 milliGRAM(s) IV Push once  fentaNYL   Patch  12 MICROgram(s)/Hr 1 Patch Transdermal every 72 hours  fentaNYL   Patch  25 MICROgram(s)/Hr 1 Patch Transdermal every 72 hours  hydrocortisone sodium succinate Injectable 100 milliGRAM(s) IV Push once  influenza   Vaccine 0.5 milliLiter(s) IntraMuscular once  levETIRAcetam 500 milliGRAM(s) Oral two times a day  multivitamin 1 Tablet(s) Oral daily  pantoprazole    Tablet 40 milliGRAM(s) Oral before breakfast  sodium chloride 0.9% lock flush 20 milliLiter(s) IV Push once  sodium chloride 0.9%. 1000 milliLiter(s) IV Continuous <Continuous>      PRN MEDICATIONS  acetaminophen   Tablet .. 650 milliGRAM(s) Oral every 6 hours PRN  acetaminophen   Tablet .. 650 milliGRAM(s) Oral every 6 hours PRN  metoclopramide Injectable 10 milliGRAM(s) IV Push every 6 hours PRN  oxyCODONE    IR 15 milliGRAM(s) Oral every 3 hours PRN  sodium chloride 0.9% lock flush 10 milliLiter(s) IV Push every 1 hour PRN  sodium chloride 0.9% lock flush 10 milliLiter(s) IV Push every 12 hours PRN        Vital Signs Last 24 Hrs  T(C): 37 (20 Nov 2018 05:18), Max: 37 (19 Nov 2018 20:50)  T(F): 98.6 (20 Nov 2018 05:18), Max: 98.6 (19 Nov 2018 20:50)  HR: 67 (20 Nov 2018 05:18) (67 - 88)  BP: 130/74 (20 Nov 2018 05:18) (130/74 - 142/83)  BP(mean): --  RR: 18 (20 Nov 2018 05:18) (18 - 20)  SpO2: 98% (20 Nov 2018 05:18) (98% - 100%)    PHYSICAL EXAM  General: NAD  HEENT: PERRLA, EOMI   Neck: supple  CV: (+) S1/S2 RRR  Lungs: clear to auscultation, no wheezes or rales  Abdomen: soft, non-tender, non-distended (+) BS  Ext: no clubbing, cyanosis or edema  Skin: no rashes and no petechiae  Neuro: alert and oriented X 3, no focal deficits  Central Line: RUE PICC line C/D/I     RECENT CULTURES:    Culture - Blood in AM (11.05.18 @ 08:42)    Specimen Source: .Blood Blood-Peripheral    Culture Results:   No growth at 5 days.    Culture - Blood in AM (11.05.18 @ 08:42)    Specimen Source: .Blood Blood-Catheter    Culture Results:   No growth at 5 days.            LABS:                        9.1    6.0   )-----------( 66       ( 20 Nov 2018 07:05 )             28.0         Mean Cell Volume : 100.0 fl  Mean Cell Hemoglobin : 32.5 pg  Mean Cell Hemoglobin Concentration : 32.4 gm/dL  Auto Neutrophil # : 3.0 K/uL  Auto Lymphocyte # : 1.8 K/uL  Auto Monocyte # : 0.9 K/uL  Auto Eosinophil # : 0.3 K/uL  Auto Basophil # : 0.0 K/uL  Auto Neutrophil % : 49.9 %  Auto Lymphocyte % : 30.0 %  Auto Monocyte % : 14.9 %  Auto Eosinophil % : 4.5 %  Auto Basophil % : 0.7 %      11-20    142  |  106  |  10  ----------------------------<  98  3.8   |  24  |  0.65    Ca    10.8<H>      20 Nov 2018 07:07  Phos  4.1     11-20  Mg     2.1     11-20    TPro  6.5  /  Alb  3.7  /  TBili  0.5  /  DBili  x   /  AST  21  /  ALT  15  /  AlkPhos  102  11-20      Mg 2.1  Phos 4.1      PT/INR - ( 19 Nov 2018 07:28 )   PT: 11.0 sec;   INR: 0.96 ratio         PTT - ( 19 Nov 2018 07:28 )  PTT:29.4 sec      Uric Acid 3.3        RADIOLOGY & ADDITIONAL STUDIES:    < from: CT Head No Cont (11.15.18 @ 13:08) >  Slightly disproportionate cerebellar volume loss. Right   frontal Ommaya reservoir. No change in the ventricle size compared with   the prior. No other significant pathology. No change from 11/3/2018

## 2018-11-20 NOTE — ADVANCED PRACTICE NURSE CONSULT - ASSESSMENT
Cycle 1, day 1/1,Height and weighbsa t verified. Lab results as per Md coleen aware of same. Vital signs stable prior to chemotherapy, and  within accepectable parameters, see sunrise. Pt educated on the importance of saving urine, verbalizes good understanding. Pt education done re chemo regimen, drug effects and potential side effects,  pt states understa. Pt withR PICCline, site free from signs and symptoms of infection, good blood return obtained. Pre- medicated withtylenol 650 mg po benadryl 50 mg ivss hydrocortisone 100 mg ivp inotuzumab ivpb infused over 1 hr w/o incent

## 2018-11-20 NOTE — PROGRESS NOTE ADULT - PROVIDER SPECIALTY LIST ADULT
Heme/Onc
Infectious Disease
Intervent Radiology
Intervent Radiology
Neurosurgery
Palliative Care
Palliative Care
Infectious Disease
Heme/Onc

## 2018-11-20 NOTE — PROGRESS NOTE ADULT - PROBLEM SELECTOR PROBLEM 1
Acute lymphoblastic leukemia (ALL) in relapse
Acute lymphoblastic leukemia (ALL) in relapse
ALL (acute lymphoblastic leukemia)
Acute lymphoblastic leukemia (ALL) in relapse
Acute lymphoblastic leukemia (ALL) in remission
Acute lymphoblastic leukemia (ALL) in remission
Acute lymphoblastic leukemia (ALL) in relapse
Acute lymphoblastic leukemia (ALL) in relapse

## 2018-11-28 ENCOUNTER — MESSAGE (OUTPATIENT)
Age: 58
End: 2018-11-28

## 2018-11-29 ENCOUNTER — MESSAGE (OUTPATIENT)
Age: 58
End: 2018-11-29

## 2018-11-30 ENCOUNTER — RESULT REVIEW (OUTPATIENT)
Age: 58
End: 2018-11-30

## 2018-11-30 ENCOUNTER — INBOUND DOCUMENT (OUTPATIENT)
Age: 58
End: 2018-11-30

## 2018-11-30 ENCOUNTER — APPOINTMENT (OUTPATIENT)
Dept: HEMATOLOGY ONCOLOGY | Facility: CLINIC | Age: 58
End: 2018-11-30

## 2018-11-30 ENCOUNTER — OUTPATIENT (OUTPATIENT)
Dept: OUTPATIENT SERVICES | Facility: HOSPITAL | Age: 58
LOS: 1 days | End: 2018-11-30
Payer: MEDICARE

## 2018-11-30 DIAGNOSIS — C91.00 ACUTE LYMPHOBLASTIC LEUKEMIA NOT HAVING ACHIEVED REMISSION: ICD-10-CM

## 2018-11-30 DIAGNOSIS — Z41.9 ENCOUNTER FOR PROCEDURE FOR PURPOSES OTHER THAN REMEDYING HEALTH STATE, UNSPECIFIED: Chronic | ICD-10-CM

## 2018-11-30 DIAGNOSIS — Z98.89 OTHER SPECIFIED POSTPROCEDURAL STATES: Chronic | ICD-10-CM

## 2018-11-30 DIAGNOSIS — D17.9 BENIGN LIPOMATOUS NEOPLASM, UNSPECIFIED: Chronic | ICD-10-CM

## 2018-11-30 LAB
BASOPHILS # BLD AUTO: 0.1 K/UL — SIGNIFICANT CHANGE UP (ref 0–0.2)
BASOPHILS NFR BLD AUTO: 0.6 % — SIGNIFICANT CHANGE UP (ref 0–2)
BLD GP AB SCN SERPL QL: POSITIVE — SIGNIFICANT CHANGE UP
EOSINOPHIL # BLD AUTO: 0.3 K/UL — SIGNIFICANT CHANGE UP (ref 0–0.5)
EOSINOPHIL NFR BLD AUTO: 3.5 % — SIGNIFICANT CHANGE UP (ref 0–6)
HCT VFR BLD CALC: 36.6 % — SIGNIFICANT CHANGE UP (ref 34.5–45)
HGB BLD-MCNC: 12.3 G/DL — SIGNIFICANT CHANGE UP (ref 11.5–15.5)
LYMPHOCYTES # BLD AUTO: 2.6 K/UL — SIGNIFICANT CHANGE UP (ref 1–3.3)
LYMPHOCYTES # BLD AUTO: 26.1 % — SIGNIFICANT CHANGE UP (ref 13–44)
MCHC RBC-ENTMCNC: 33.5 G/DL — SIGNIFICANT CHANGE UP (ref 32–36)
MCHC RBC-ENTMCNC: 34.6 PG — HIGH (ref 27–34)
MCV RBC AUTO: 103 FL — HIGH (ref 80–100)
MONOCYTES # BLD AUTO: 1.2 K/UL — HIGH (ref 0–0.9)
MONOCYTES NFR BLD AUTO: 12.1 % — SIGNIFICANT CHANGE UP (ref 2–14)
NEUTROPHILS # BLD AUTO: 5.7 K/UL — SIGNIFICANT CHANGE UP (ref 1.8–7.4)
NEUTROPHILS NFR BLD AUTO: 57.7 % — SIGNIFICANT CHANGE UP (ref 43–77)
PLATELET # BLD AUTO: 68 K/UL — LOW (ref 150–400)
RBC # BLD: 3.55 M/UL — LOW (ref 3.8–5.2)
RBC # FLD: 19.4 % — HIGH (ref 10.3–14.5)
RH IG SCN BLD-IMP: POSITIVE — SIGNIFICANT CHANGE UP
WBC # BLD: 10 K/UL — SIGNIFICANT CHANGE UP (ref 3.8–10.5)
WBC # FLD AUTO: 10 K/UL — SIGNIFICANT CHANGE UP (ref 3.8–10.5)

## 2018-11-30 PROCEDURE — 88291 CYTO/MOLECULAR REPORT: CPT | Mod: 59

## 2018-11-30 PROCEDURE — G0452: CPT | Mod: 26

## 2018-12-03 LAB
ALBUMIN SERPL ELPH-MCNC: 4.5 G/DL
ALP BLD-CCNC: 125 U/L
ALT SERPL-CCNC: 30 U/L
ANION GAP SERPL CALC-SCNC: 10 MMOL/L
AST SERPL-CCNC: 35 U/L
BILIRUB SERPL-MCNC: 0.7 MG/DL
BUN SERPL-MCNC: 11 MG/DL
CALCIUM SERPL-MCNC: 10.8 MG/DL
CHLORIDE SERPL-SCNC: 106 MMOL/L
CO2 SERPL-SCNC: 27 MMOL/L
CREAT SERPL-MCNC: 0.84 MG/DL
GLUCOSE SERPL-MCNC: 94 MG/DL
LDH SERPL-CCNC: 250 U/L
MAGNESIUM SERPL-MCNC: 2.2 MG/DL
POTASSIUM SERPL-SCNC: 4.3 MMOL/L
PROT SERPL-MCNC: 6.9 G/DL
SODIUM SERPL-SCNC: 143 MMOL/L

## 2018-12-04 LAB — CHROM ANALY INTERPHASE BLD FISH-IMP: SIGNIFICANT CHANGE UP

## 2018-12-05 ENCOUNTER — APPOINTMENT (OUTPATIENT)
Dept: HEMATOLOGY ONCOLOGY | Facility: CLINIC | Age: 58
End: 2018-12-05

## 2018-12-05 LAB — BCR/ABL BY RT - PCR QUANTITATIVE: SIGNIFICANT CHANGE UP

## 2018-12-06 ENCOUNTER — RESULT REVIEW (OUTPATIENT)
Age: 58
End: 2018-12-06

## 2018-12-06 ENCOUNTER — APPOINTMENT (OUTPATIENT)
Dept: HEMATOLOGY ONCOLOGY | Facility: CLINIC | Age: 58
End: 2018-12-06
Payer: MEDICARE

## 2018-12-06 VITALS
WEIGHT: 244.27 LBS | DIASTOLIC BLOOD PRESSURE: 87 MMHG | TEMPERATURE: 98.6 F | OXYGEN SATURATION: 100 % | HEART RATE: 78 BPM | RESPIRATION RATE: 16 BRPM | SYSTOLIC BLOOD PRESSURE: 138 MMHG | BODY MASS INDEX: 43.55 KG/M2

## 2018-12-06 DIAGNOSIS — R51 HEADACHE: ICD-10-CM

## 2018-12-06 LAB
BASOPHILS # BLD AUTO: 0.1 K/UL — SIGNIFICANT CHANGE UP (ref 0–0.2)
BASOPHILS NFR BLD AUTO: 0.6 % — SIGNIFICANT CHANGE UP (ref 0–2)
EOSINOPHIL # BLD AUTO: 0.4 K/UL — SIGNIFICANT CHANGE UP (ref 0–0.5)
EOSINOPHIL NFR BLD AUTO: 4.7 % — SIGNIFICANT CHANGE UP (ref 0–6)
HCT VFR BLD CALC: 37.3 % — SIGNIFICANT CHANGE UP (ref 34.5–45)
HGB BLD-MCNC: 12.6 G/DL — SIGNIFICANT CHANGE UP (ref 11.5–15.5)
LYMPHOCYTES # BLD AUTO: 2.6 K/UL — SIGNIFICANT CHANGE UP (ref 1–3.3)
LYMPHOCYTES # BLD AUTO: 27.8 % — SIGNIFICANT CHANGE UP (ref 13–44)
MCHC RBC-ENTMCNC: 33.7 G/DL — SIGNIFICANT CHANGE UP (ref 32–36)
MCHC RBC-ENTMCNC: 35 PG — HIGH (ref 27–34)
MCV RBC AUTO: 104 FL — HIGH (ref 80–100)
MONOCYTES # BLD AUTO: 1.2 K/UL — HIGH (ref 0–0.9)
MONOCYTES NFR BLD AUTO: 12.6 % — SIGNIFICANT CHANGE UP (ref 2–14)
NEUTROPHILS # BLD AUTO: 5 K/UL — SIGNIFICANT CHANGE UP (ref 1.8–7.4)
NEUTROPHILS NFR BLD AUTO: 54.2 % — SIGNIFICANT CHANGE UP (ref 43–77)
PLATELET # BLD AUTO: 58 K/UL — LOW (ref 150–400)
RBC # BLD: 3.59 M/UL — LOW (ref 3.8–5.2)
RBC # FLD: 18.4 % — HIGH (ref 10.3–14.5)
WBC # BLD: 9.2 K/UL — SIGNIFICANT CHANGE UP (ref 3.8–10.5)
WBC # FLD AUTO: 9.2 K/UL — SIGNIFICANT CHANGE UP (ref 3.8–10.5)

## 2018-12-06 PROCEDURE — 99214 OFFICE O/P EST MOD 30 MIN: CPT

## 2018-12-06 RX ORDER — ONDANSETRON 8 MG/1
8 TABLET ORAL
Qty: 12 | Refills: 0 | Status: DISCONTINUED | COMMUNITY
Start: 2017-12-21 | End: 2018-12-06

## 2018-12-06 RX ORDER — ATOVAQUONE 750 MG/5ML
750 SUSPENSION ORAL TWICE DAILY
Qty: 1 | Refills: 2 | Status: DISCONTINUED | COMMUNITY
Start: 2017-08-14 | End: 2018-12-06

## 2018-12-06 RX ORDER — FENTANYL 12 UG/H
12 PATCH, EXTENDED RELEASE TRANSDERMAL
Qty: 10 | Refills: 0 | Status: DISCONTINUED | COMMUNITY
Start: 2018-12-06 | End: 2018-12-06

## 2018-12-06 RX ORDER — OMEPRAZOLE MAGNESIUM 20 MG/1
20 CAPSULE, DELAYED RELEASE ORAL DAILY
Refills: 0 | Status: DISCONTINUED | COMMUNITY
End: 2018-12-06

## 2018-12-06 RX ORDER — FENTANYL 25 UG/H
25 PATCH, EXTENDED RELEASE TRANSDERMAL
Qty: 10 | Refills: 0 | Status: DISCONTINUED | COMMUNITY
Start: 2018-12-06 | End: 2018-12-06

## 2018-12-06 RX ORDER — ALBUTEROL SULFATE 90 UG/1
108 (90 BASE) AEROSOL, METERED RESPIRATORY (INHALATION) EVERY 6 HOURS
Qty: 1 | Refills: 5 | Status: DISCONTINUED | COMMUNITY
Start: 2018-06-15 | End: 2018-12-06

## 2018-12-06 RX ORDER — OMEPRAZOLE 40 MG/1
40 CAPSULE, DELAYED RELEASE ORAL
Qty: 30 | Refills: 3 | Status: DISCONTINUED | COMMUNITY
Start: 2017-01-06 | End: 2018-12-06

## 2018-12-06 RX ORDER — IBUPROFEN 600 MG/1
600 TABLET, FILM COATED ORAL
Qty: 20 | Refills: 0 | Status: DISCONTINUED | COMMUNITY
Start: 2017-12-24 | End: 2018-12-06

## 2018-12-06 RX ORDER — MULTIVITAMIN
TABLET ORAL DAILY
Refills: 0 | Status: DISCONTINUED | COMMUNITY
End: 2018-12-06

## 2018-12-06 RX ORDER — BENZONATATE 100 MG/1
100 CAPSULE ORAL 3 TIMES DAILY
Qty: 90 | Refills: 1 | Status: DISCONTINUED | COMMUNITY
Start: 2017-03-22 | End: 2018-12-06

## 2018-12-06 RX ORDER — AZITHROMYCIN 250 MG/1
250 TABLET, FILM COATED ORAL
Qty: 6 | Refills: 0 | Status: DISCONTINUED | COMMUNITY
Start: 2017-12-21 | End: 2018-12-06

## 2018-12-06 RX ORDER — MAGNESIUM OXIDE 241.3 MG/1000MG
400 TABLET ORAL
Qty: 60 | Refills: 2 | Status: DISCONTINUED | COMMUNITY
Start: 2018-02-15 | End: 2018-12-06

## 2018-12-06 RX ORDER — ACYCLOVIR 400 MG/1
400 TABLET ORAL
Qty: 90 | Refills: 2 | Status: DISCONTINUED | COMMUNITY
Start: 2017-08-14 | End: 2018-12-06

## 2018-12-06 RX ORDER — IMATINIB MESYLATE 400 MG/1
400 TABLET, FILM COATED ORAL DAILY
Qty: 30 | Refills: 3 | Status: DISCONTINUED | COMMUNITY
Start: 2017-09-07 | End: 2018-12-06

## 2018-12-06 RX ORDER — CYCLOBENZAPRINE HYDROCHLORIDE 10 MG/1
10 TABLET, FILM COATED ORAL
Qty: 12 | Refills: 0 | Status: DISCONTINUED | COMMUNITY
Start: 2017-12-24 | End: 2018-12-06

## 2018-12-06 RX ORDER — CYCLOBENZAPRINE HYDROCHLORIDE 10 MG/1
10 TABLET, FILM COATED ORAL 3 TIMES DAILY
Qty: 90 | Refills: 0 | Status: DISCONTINUED | COMMUNITY
Start: 2018-04-20 | End: 2018-12-06

## 2018-12-06 RX ORDER — PREGABALIN 25 MG/1
25 CAPSULE ORAL
Qty: 30 | Refills: 0 | Status: DISCONTINUED | COMMUNITY
Start: 2018-04-20 | End: 2018-12-06

## 2018-12-06 RX ORDER — MULTIVITAMIN
TABLET ORAL DAILY
Qty: 30 | Refills: 3 | Status: DISCONTINUED | COMMUNITY
Start: 2017-01-25 | End: 2018-12-06

## 2018-12-06 RX ORDER — OXYCODONE 5 MG/1
5 TABLET ORAL
Qty: 60 | Refills: 0 | Status: DISCONTINUED | COMMUNITY
Start: 2018-04-20 | End: 2018-12-06

## 2018-12-06 RX ORDER — METOCLOPRAMIDE 10 MG/1
10 TABLET ORAL
Qty: 60 | Refills: 3 | Status: DISCONTINUED | COMMUNITY
Start: 2017-02-21 | End: 2018-12-06

## 2018-12-06 RX ORDER — IMATINIB MESYLATE 100 MG
CAPSULE ORAL
Refills: 0 | Status: COMPLETED | COMMUNITY
End: 2018-12-06

## 2018-12-06 NOTE — ASSESSMENT
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  Recent BM BX was c/w remission. Fish and pcr were negative. Step 1 complicated by hospitalization for neutropenic fevers and c-diff diarrhea 11/1-11/11. \par Was able to successfully collect her stem cell while inpt on 11/8 and 11/9. \par Now s/p Masha-Auto on 12/16/16. Hospital course complicated by pancytopenia and fungal PNA. 12/22 CT chest revealed new 1cm right lung nodule and was started on voriconazole. Also +RVP for rhinovirus/enterovirus. Demonstrated engraftment on 12/26 and d/c'ed in stable condition on 1/3/17.\par \par - Remains clinically stable since discharge. \par - Demonstrating engraftment, peripheral blood counts are stable to improved  ...No need for growth factor or transfusions today. Reinforced with pt that counts will continue to fluctuate at this time. 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 \par - Continue folic acid and MVA supplementation.\par - ID, immunocompromised status. Reinforced post-transplant restrictions/precautions as previously discussed. She can discontinue Acyclovir and Mepron as instructed. Previously discontinued voriconazole...Meds reviewed, stressed compliance..Monitor closely for s/s infection. \par - Fungal PNA in hospital, resolved...Continue to monitor closely. \par - GI, continue zofran and reglan prn for nausea. Prilosec prn. \par - HTN, stable on current dose of Cozaar. Continue to monitor. \par - Deconditioning, improved\par \par -Repeat echocardiogram, PFTs, thyroid function tests, IgGs, ferritin levels\par \par -At the one year rhina now start re-vaccination process including vaccines at 12 months, 14 months, and 24 months. I have advised the patient to receive the influenza vaccine. \par \par - Questions addressed. Emotional support provided.\par - RTC,  8 weeks with me and continue her second session of  vaccines.. ..will send fish and pcr for bcr-abl....pcr pos for p210....c/w cml......will follow.....cont gleevec....Pt knows to call should she experience any acute changes in interim. \par Disability paperwork and temporary handicapped parking paperwork done\par Recommended trying tonic water before bed to help alleviate the muscle spasm. \par Prescribed Magnesium BID \par First session of revaccination (Tdap, Inactivated polio, prevnar, hemophilus influenza B) due now. 2/15/18\par RTC in 8 weeks to see me and receive 2nd session of vaccine. \par \par 4/12/18\par see above...f/u fish and pcr\par Peripheral blood work stable and discussed with patient. \par Continue Gleevec maintenance\par Pt will be completing 2nd session of vaccines April 2018. Third session due in February 2019.\par RTC in 3 months - Pt knows to call should she experience any acute changes in interim. \par To be evaluated by pain specialist\par \par 10/4/18\par Unable to draw labs as patient had an episode of emesis in the lab\par After assessment patient agrees to go to the emergency at [facility].\par Fellow at [facility] and the emergency room aware patient will be arriving shortly\par MD Bentley aware of plan of care. \par Heme team will follow up with [facility] inpatient.\par Questions and concerns addressed\par \par 12/6/18\par Patient is a 58 year old female with a PMH of Ph + ALL S/P Auto PSCT on 12/16/16, recently relapsed.\par Received two cycles of Inotuzamab, given on day 1, 8,15 while admitted from 10/4/18 -11/20/18\par Patient presenting today for a follow up visit after recent discharge\par \par Peripheral blood work from today remains stable. F/U CMP, LDH, MG, BCR/ABL\par Pending PFT for presenting\par Awaiting HLA typing from sons. No matched donors on unrelated donor search\par BM Bx scheduled for 12/13/18 with premeds\par \par Continue medications listed below:\par oxyCODONE 15 mg oral tablet -- 1 tab(s) by mouth every 3 hours, As needed, Moderate Pain (4 - 6) or severe pain 7-10 MDD:8 tabs -- Indication: For Pain/ headache \par Cozaar 100 mg oral tablet -- 1 tab(s) by mouth once a day -- Indication: For HTN \par LevETIRAcetam 500 mg oral tablet -- 1 tab(s) by mouth 2 times a day -- Indication: For Seizure prophylaxis \par Zofran 8 mg oral tablet -- 1 tab(s) by mouth every 8 hours, As Needed for nausea -- Indication: For Nausea \par Pantoprazole 40 mg oral delayed release tablet -- 1 tab(s) by mouth once a day (before a meal) -- Indication: For reflux. \par \par Questions and concerns addressed\par RTC in one week with MD Bentley

## 2018-12-06 NOTE — PHYSICAL EXAM
[Ambulatory and capable of all self care but unable to carry out any work activities] : Status 2- Ambulatory and capable of all self care but unable to carry out any work activities. Up and about more than 50% of waking hours [Normal] : normal appearance, no rash, nodules, vesicles, ulcers, erythema [de-identified] : Right double lumen PICC. Dressing clean, dry and intact

## 2018-12-06 NOTE — HISTORY OF PRESENT ILLNESS
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 Her induction course was complicated by tutu fever. She received cycle 2 mid may. BM BX prior was morphologically remission but pcr was positive. Cycle 4 was completed. Prolonged course with fever, pericardial effusion, possible fungal pna. I suspect fever and effusion due to sprycel. She also had neurologic / mental status changes after MTX. She has an omaya. She had two MTX IT. She feels well today.  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 \par Was able to successfully collect her stem cell while inpt on 11/8 and 11/9. \par Now s/p Masha-Auto on 12/16/16. Hospital course complicated by pancytopenia and fungal PNA. 12/22 CT chest revealed new 1cm right lung nodule and was started on voriconazole. ALso +RVP for rhinovirus/enterovirus. Demonstrated engraftment on 12/26 and d/c'ed in stable condition on 1/3/17.\par \par At his 9/2017 visit, the patient presents today for lab check and post-transplant F/U and bm bx with premeds.... on gleevec. Remains clinically stable since hospital discharge. \par Endorses baseline fatigue and generalized weakness but improving. Energy level also better.  Able to ambulate without assistance now, still uses cane for balance and support.  Able to tolerate ADLs independently now. Good family support at home. Lost significant amount of weight during mai-transplant process.  Now gaining weight. Appetite is fair but also improving, starting to get some taste buds back. Denies N/V/D. Stools now formed. occ HA and low back discomfort. Denies fevers, chills.  Denies CP, SOB, difficulty breathing, dizziness, easy bleeding/bruising. pcr low positive...fish negative\par \par \par At his 12/12/17 visit, the patient reports she is feeling generally well though she had a bout of diarrhea following Thanksgiving. She complains of a residual cough and notes initially she had an evaluation at [facility] and she has completed a course of Zithromax. She complains of a residual sore throat.  She reports she is still using a cane for back with ambulation. She denies any edema, leg pain, fever, chills, or vomiting. \par \par On 2/15/18 visit patient reports experiencing muscle spasm in the lower extremity and back which she describes as sharp and stabbing. She is using a cane.  She reports being taken to the hospital by ambulance on Christmas due to the spams on 12/25/2017 at which point she was prescribed Cyclobenzaprine and ibuprofen. In compliance with her medication. She noted lack of sense of smell but, sense of taste is intact. Denies nausea, vomiting, diarrhea, chest pain, SOB or b/l LE edema. \par \par On 4/12/18 visit, patient reports diarrhea for 3 days, taking Pepto Bismol. Persistent, progressively worsening back pain radiating to left knee. Requires a cane to ambulate. Noted headache for 3 days, self-resolved. In compliance with her medication. Denies change in appetite or sleep disturbances. Denies nausea, vomiting, diarrhea, chest pain, SOB or b/l LE edema. Will be following up with pain-management specialists on Friday. \par \par On 10/4/18 visit, patient presents today for blood work. In the lab Kellen had an episode of emesis. Patient was placed in an exam room. She has a nonproductive cough with shortness of breath. Fatigued with petechiae of bilateral upper extremities and mucosal membranes. Kellen agrees to go to the Emergency room to be admitted. \par \par Patient presented to the Emergency room on 10/4/18 with severe occipital parietal headaches with nausea and vomiting. Patient also admitted for petechiae of mucosal membrane and dark stools. CT Head was done for complaints of headache which was (+) for SDH. Neurosurgery was consulted on initial detection of SDH, recommendations to keep platelets >80,000 was made.  LGIB was attributed to profound thrombocytopenia. 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 A PICC line was placed for chemotherapy and was initiated with Inotuzamab on Day 1, 8 and 15. IVF and Allopurinol for TLS. The patient has had refractory thrombocytopenia. H L A platelets were infused when they were available. When H L A platelets were not available 1/2 unit of platelet was infused over 3 hours. Follow up CT Head on 10/11 was stable and the patient continued to receive 1/2 units of platelets over 3 hours every 12 hours. The patient received last dose of Inotuzamab Cycle 1 on 10/22. \par \par On 10/15 patient was febrile,  a CT of the chest/abd/pelvis was done which showed scattered patchy ground glass opacities in right upper and lower lobes, suggestive of infection, patient received a course of IV antibiotics for Pneumonia. On 11/2 Blood cultures were found to be (+) for Coagulase (-) Staph and patient received a course of Vancomycin. Repeat CT Head was completed on 10/23 for follow up and showed some new bleeding into previous collection. Findings were discussed with Neuro Surgery. HLA platelets were administered when available for refractory thrombocytopenia. \par \par Cycle 2 Inotuzumab was started on 11/6 which was given on Days 1, 8, 15. Patient tolerated second cycle without any adverse reactions. Patient for possible future Haploidentical Allogeneic stem cell transplant after discharge. Pre-BMT testing completed prior to discharge: Echo, MUGA, Xray sinuses, 24-Hr Urine for Creatinine. \par Patient had intermittent severe headaches on 11/3 and 11/15, repeat CTs showed nearly resolved hemorrhage. Headaches were managed with analgesics Fentanyl patch 37 mcg/hr and Oxycodone IR 15 mg q3 prn prior to discharge home. Patient was advised to follow with Dr. Bentley as outpatient. \par \par On 12/6/18 visit, patient presents for a follow up visit after being discharged from [facility] on 11/20/18 for Relapsed ph + ALL. States overall is doing well overall. Offers no acute concerns today. States once in a while has a headache. Denies any vision changes, loss of consciousness or recent falls. Denies fever, rash, chills, mouth sores, diarrhea, vomiting or nausea. Denies shortness of breath, chest pain or B/L LE Edema. Right upper arm double lumen PICC in place. Home care nurses are scheduled weekly to change PICC dressing. Denies any pain at PICC line site. Remains compliant with medications. Fentanyl patch was discontinued after discharge. Denies any pain at this time.\par

## 2018-12-06 NOTE — REASON FOR VISIT
[Follow-Up Visit] : a follow-up visit for [Acute Lymphoblastic Leukemia] : acute lymphoblastic leukemia [FreeTextEntry2] :  s/p Masha-Auto on 12/16/16

## 2018-12-06 NOTE — PROCEDURE
[Bone Marrow Biopsy] : bone marrow biopsy [Bone Marrow Aspiration] : bone marrow aspiration  [Patient] : the patient [Verbal Consent Obtained] : verbal consent was obtained prior to the procedure [Patient identification verified] : patient identification verified [Procedure verified and consent obtained] : procedure verified and consent obtained [Laterality verified and correct site marked] : laterality verified and correct site marked [Left] : site: left [Correct positioning] : correct positioning [Correct implant and/ or special equipment obtained] : correct impact and/ or special equipment obtained [Prone] : prone [Superior iliac spine was identified] : the superior iliac spine was identified. [The left posterior iliac crest was prepped with betadine and draped, using sterile technique.] : The left posterior iliac crest was prepped with betadine and draped, using sterile technique. [Lidocaine was injected and into the periosteum overlying the site.] : Lidocaine was injected and into the periosteum overlying the site. [Aspirate] : aspirate [Cytogenetics] : cytogenetics [FISH] : FISH [PCR] : PCR [Biopsy] : biopsy [Flow Cytometry] : flow cytometry [] : The patient was instructed to remove the bandage the following AM. The patient may bathe. Acetaminophen may be taken for discomfort, as per package directions.If there are any other problems, the patient was instructed to call the office. The patient verbalized understanding, and is aware of the office contact numbers. [FreeTextEntry1] : ALL [FreeTextEntry2] : no complications..bx sample was small

## 2018-12-06 NOTE — REVIEW OF SYSTEMS
[Easy Bruising] : a tendency for easy bruising [Negative] : Allergic/Immunologic [Fever] : no fever [Chills] : no chills [Dysphagia] : no dysphagia [Mucosal Pain] : no mucosal pain [Shortness Of Breath] : no shortness of breath [Cough] : no cough [SOB on Exertion] : no shortness of breath during exertion [Vomiting] : no vomiting [Diarrhea] : no diarrhea [Muscle Weakness] : no muscle weakness [Confused] : no confusion [Fainting] : no fainting [FreeTextEntry2] : Denies fever or chills  [FreeTextEntry3] : Denies any recent vision changes [FreeTextEntry4] : Headaches intermittently, denies loss of consciousness, denies mouth sores [FreeTextEntry5] : Denies chest pain  [FreeTextEntry6] : Denies shortness of breath, cough or dyspnea on exertion [FreeTextEntry7] : Denies diarrhea or nausea [FreeTextEntry9] : back pain [de-identified] : Denies any recent falls [de-identified] : Denies any recent nose bleeds

## 2018-12-07 LAB
ALBUMIN SERPL ELPH-MCNC: 4.3 G/DL
ALP BLD-CCNC: 138 U/L
ALT SERPL-CCNC: 28 U/L
ANION GAP SERPL CALC-SCNC: 12 MMOL/L
AST SERPL-CCNC: 30 U/L
BILIRUB SERPL-MCNC: 0.6 MG/DL
BUN SERPL-MCNC: 14 MG/DL
CALCIUM SERPL-MCNC: 11.1 MG/DL
CHLORIDE SERPL-SCNC: 108 MMOL/L
CO2 SERPL-SCNC: 24 MMOL/L
CREAT SERPL-MCNC: 0.82 MG/DL
GLUCOSE SERPL-MCNC: 87 MG/DL
POTASSIUM SERPL-SCNC: 4.5 MMOL/L
PROT SERPL-MCNC: 6.6 G/DL
SODIUM SERPL-SCNC: 144 MMOL/L

## 2018-12-07 NOTE — PROGRESS NOTE ADULT - ASSESSMENT
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 FISH and PCR negative. s/p Masha-Auto on 12/16/16. 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   Neutropenia resolved, recovering counts.   resolved fever   All cultures negative to date.   Plan for second cycle of chemo starting next week.     Plan:   Can deescalate Isabella to cefepime 2 gm iv q8h given no obvious source of infection and pt afebrile.   On micafungin for prophylaxis.   CT chest non specific, pt asymptomatic and afebrile right now. no

## 2018-12-13 ENCOUNTER — LABORATORY RESULT (OUTPATIENT)
Age: 58
End: 2018-12-13

## 2018-12-13 ENCOUNTER — RESULT REVIEW (OUTPATIENT)
Age: 58
End: 2018-12-13

## 2018-12-13 ENCOUNTER — APPOINTMENT (OUTPATIENT)
Dept: INFUSION THERAPY | Facility: HOSPITAL | Age: 58
End: 2018-12-13

## 2018-12-13 ENCOUNTER — APPOINTMENT (OUTPATIENT)
Dept: HEMATOLOGY ONCOLOGY | Facility: CLINIC | Age: 58
End: 2018-12-13
Payer: MEDICARE

## 2018-12-13 VITALS
WEIGHT: 243.61 LBS | TEMPERATURE: 98.1 F | HEART RATE: 76 BPM | SYSTOLIC BLOOD PRESSURE: 118 MMHG | DIASTOLIC BLOOD PRESSURE: 86 MMHG | BODY MASS INDEX: 43.44 KG/M2 | RESPIRATION RATE: 16 BRPM | OXYGEN SATURATION: 98 %

## 2018-12-13 LAB
BASOPHILS # BLD AUTO: 0.1 K/UL — SIGNIFICANT CHANGE UP (ref 0–0.2)
BASOPHILS NFR BLD AUTO: 1 % — SIGNIFICANT CHANGE UP (ref 0–2)
EOSINOPHIL # BLD AUTO: 0.3 K/UL — SIGNIFICANT CHANGE UP (ref 0–0.5)
EOSINOPHIL NFR BLD AUTO: 3.1 % — SIGNIFICANT CHANGE UP (ref 0–6)
HCT VFR BLD CALC: 38.3 % — SIGNIFICANT CHANGE UP (ref 34.5–45)
HGB BLD-MCNC: 12.6 G/DL — SIGNIFICANT CHANGE UP (ref 11.5–15.5)
LYMPHOCYTES # BLD AUTO: 2.7 K/UL — SIGNIFICANT CHANGE UP (ref 1–3.3)
LYMPHOCYTES # BLD AUTO: 26.6 % — SIGNIFICANT CHANGE UP (ref 13–44)
MCHC RBC-ENTMCNC: 32.9 G/DL — SIGNIFICANT CHANGE UP (ref 32–36)
MCHC RBC-ENTMCNC: 34.4 PG — HIGH (ref 27–34)
MCV RBC AUTO: 105 FL — HIGH (ref 80–100)
MONOCYTES # BLD AUTO: 1.1 K/UL — HIGH (ref 0–0.9)
MONOCYTES NFR BLD AUTO: 11.2 % — SIGNIFICANT CHANGE UP (ref 2–14)
NEUTROPHILS # BLD AUTO: 5.9 K/UL — SIGNIFICANT CHANGE UP (ref 1.8–7.4)
NEUTROPHILS NFR BLD AUTO: 58.1 % — SIGNIFICANT CHANGE UP (ref 43–77)
PLATELET # BLD AUTO: 71 K/UL — LOW (ref 150–400)
RBC # BLD: 3.66 M/UL — LOW (ref 3.8–5.2)
RBC # FLD: 17.5 % — HIGH (ref 10.3–14.5)
WBC # BLD: 10.1 K/UL — SIGNIFICANT CHANGE UP (ref 3.8–10.5)
WBC # FLD AUTO: 10.1 K/UL — SIGNIFICANT CHANGE UP (ref 3.8–10.5)

## 2018-12-13 PROCEDURE — 99215 OFFICE O/P EST HI 40 MIN: CPT

## 2018-12-14 ENCOUNTER — APPOINTMENT (OUTPATIENT)
Dept: HEMATOLOGY ONCOLOGY | Facility: CLINIC | Age: 58
End: 2018-12-14

## 2018-12-14 ENCOUNTER — RX CHANGE (OUTPATIENT)
Age: 58
End: 2018-12-14

## 2018-12-14 DIAGNOSIS — R11.2 NAUSEA WITH VOMITING, UNSPECIFIED: ICD-10-CM

## 2018-12-14 DIAGNOSIS — R52 PAIN, UNSPECIFIED: ICD-10-CM

## 2018-12-17 ENCOUNTER — OUTPATIENT (OUTPATIENT)
Dept: OUTPATIENT SERVICES | Facility: HOSPITAL | Age: 58
LOS: 1 days | Discharge: HOME | End: 2018-12-17

## 2018-12-17 DIAGNOSIS — Z94.84 STEM CELLS TRANSPLANT STATUS: ICD-10-CM

## 2018-12-17 DIAGNOSIS — Z41.9 ENCOUNTER FOR PROCEDURE FOR PURPOSES OTHER THAN REMEDYING HEALTH STATE, UNSPECIFIED: Chronic | ICD-10-CM

## 2018-12-17 DIAGNOSIS — D17.9 BENIGN LIPOMATOUS NEOPLASM, UNSPECIFIED: Chronic | ICD-10-CM

## 2018-12-17 DIAGNOSIS — C91.00 ACUTE LYMPHOBLASTIC LEUKEMIA NOT HAVING ACHIEVED REMISSION: ICD-10-CM

## 2018-12-17 DIAGNOSIS — Z98.89 OTHER SPECIFIED POSTPROCEDURAL STATES: Chronic | ICD-10-CM

## 2018-12-17 NOTE — PHYSICAL EXAM
[Ambulatory and capable of all self care but unable to carry out any work activities] : Status 2- Ambulatory and capable of all self care but unable to carry out any work activities. Up and about more than 50% of waking hours [Normal] : affect appropriate [de-identified] : pic in place....removed without complication...mininal purulence at site [de-identified] : stable gait

## 2018-12-17 NOTE — HISTORY OF PRESENT ILLNESS
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 Her induction course was complicated by tutu fever. She received cycle 2 mid may. BM BX prior was morphologically remission but pcr was positive. Cycle 4 was completed. Prolonged course with fever, pericardial effusion, possible fungal pna. I suspect fever and effusion due to sprycel. She also had neurologic / mental status changes after MTX. She has an omaya. She had two MTX IT. She feels well today.  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 \par Was able to successfully collect her stem cell while inpt on 11/8 and 11/9. \par Now s/p Masha-Auto on 12/16/16. Hospital course complicated by pancytopenia and fungal PNA. 12/22 CT chest revealed new 1cm right lung nodule and was started on voriconazole. ALso +RVP for rhinovirus/enterovirus. Demonstrated engraftment on 12/26 and d/c'ed in stable condition on 1/3/17.\par \par At his 9/2017 visit, the patient presents today for lab check and post-transplant F/U and bm bx with premeds.... on gleevec. Remains clinically stable since hospital discharge. \par Endorses baseline fatigue and generalized weakness but improving. Energy level also better.  Able to ambulate without assistance now, still uses cane for balance and support.  Able to tolerate ADLs independently now. Good family support at home. Lost significant amount of weight during mai-transplant process.  Now gaining weight. Appetite is fair but also improving, starting to get some taste buds back. Denies N/V/D. Stools now formed. occ HA and low back discomfort. Denies fevers, chills.  Denies CP, SOB, difficulty breathing, dizziness, easy bleeding/bruising. pcr low positive...fish negative\par \par \par At his 12/12/17 visit, the patient reports she is feeling generally well though she had a bout of diarrhea following Thanksgiving. She complains of a residual cough and notes initially she had an evaluation at [facility] and she has completed a course of Zithromax. She complains of a residual sore throat.  She reports she is still using a cane for back with ambulation. She denies any edema, leg pain, fever, chills, or vomiting. \par \par On 2/15/18 visit patient reports experiencing muscle spasm in the lower extremity and back which she describes as sharp and stabbing. She is using a cane.  She reports being taken to the hospital by ambulance on Christmas due to the spams on 12/25/2017 at which point she was prescribed Cyclobenzaprine and ibuprofen. In compliance with her medication. She noted lack of sense of smell but, sense of taste is intact. Denies nausea, vomiting, diarrhea, chest pain, SOB or b/l LE edema. \par \par On 4/12/18 visit, patient reports diarrhea for 3 days, taking Pepto Bismol. Persistent, progressively worsening back pain radiating to left knee. Requires a cane to ambulate. Noted headache for 3 days, self-resolved. In compliance with her medication. Denies change in appetite or sleep disturbances. Denies nausea, vomiting, diarrhea, chest pain, SOB or b/l LE edema. Will be following up with pain-management specialists on Friday. \par \par On 12/13/18 visit,  s/p hospitalization for relapsed disease and cns bleeding...s/p inotuzimab two cycles.....pcr on pb still positive....patient seen at bedside for BM Bx, however procedure was unsuccessful. Offers no acute concerns today. Requires a cane to ambulate. In compliance with her medication. Denies change in appetite or sleep disturbances. Denies nausea, vomiting, diarrhea, chest pain, SOB or b/l LE edema. Will be following up with pain-management specialists on Friday.

## 2018-12-17 NOTE — REASON FOR VISIT
[Follow-Up Visit] : a follow-up visit for [Acute Lymphoblastic Leukemia] : acute lymphoblastic leukemia [Family Member] : family member [FreeTextEntry2] :  s/p Masha-Auto on 12/16/16

## 2018-12-17 NOTE — REVIEW OF SYSTEMS
[Negative] : Allergic/Immunologic [Fatigue] : no fatigue [Cough] : no cough [Muscle Weakness] : no muscle weakness [FreeTextEntry9] : back pain [de-identified] : muscle spasms

## 2018-12-17 NOTE — REASON FOR VISIT
[Bone Marrow Biopsy] : bone marrow biopsy [Bone Marrow Aspiration] : bone marrow aspiration [FreeTextEntry2] : 58 yrs old F ph+ ALL, s/p auto PSCT 2016, relapsed 2018, s/p treatment with  Inotuzamab access the progress the disease

## 2018-12-17 NOTE — ADDENDUM
[FreeTextEntry1] : Documented by Maria Guadalupe Berrios acting as a scribe for Dr. Luke Bentley on 12/13/2018.\par \par All medical record entries made by the Scribe were at my, Dr. Luke Bentley's, direction and personally dictated by me on 12/13/2018. I have reviewed the chart and agree that  the record accurately reflects my personal performance of the history, physical exam, assessment and plan. I have also personally directed, reviewed, and agree with the discharge instructions.\par

## 2018-12-17 NOTE — ASSESSMENT
[FreeTextEntry1] : Patient is a 59 y/o male with a history of Ph positive ALL s/p R Hypercvad x 4cycles with IT MTX x 2, now s/p successful stem cell collection with step 1 (HD vp16 plus arac) stem cell mobilization regimen.  Recent BM BX was c/w remission. Fish and pcr were negative. Step 1 complicated by hospitalization for neutropenic fevers and c-diff diarrhea 11/1-11/11. \par Was able to successfully collect her stem cell while inpt on 11/8 and 11/9. \par Now s/p Masha-Auto on 12/16/16. Hospital course complicated by pancytopenia and fungal PNA. 12/22 CT chest revealed new 1cm right lung nodule and was started on voriconazole. Also +RVP for rhinovirus/enterovirus. Demonstrated engraftment on 12/26 and d/c'ed in stable condition on 1/3/17.\par \par - Remains clinically stable since discharge. \par - Demonstrating engraftment, peripheral blood counts are stable to improved  ...No need for growth factor or transfusions today. Reinforced with pt that counts will continue to fluctuate at this time. 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 \par - Continue folic acid and MVA supplementation.\par - ID, immunocompromised status. Reinforced post-transplant restrictions/precautions as previously discussed. She can discontinue Acyclovir and Mepron as instructed. Previously discontinued voriconazole...Meds reviewed, stressed compliance..Monitor closely for s/s infection. \par - Fungal PNA in hospital, resolved...Continue to monitor closely. \par - GI, continue zofran and reglan prn for nausea. Prilosec prn. \par - HTN, stable on current dose of Cozaar. Continue to monitor. \par - Deconditioning, improved\par \par -Repeat echocardiogram, PFTs, thyroid function tests, IgGs, ferritin levels\par \par -At the one year rhina now start re-vaccination process including vaccines at 12 months, 14 months, and 24 months. I have advised the patient to receive the influenza vaccine. \par \par - Questions addressed. Emotional support provided.\par - RTC,  8 weeks with me and continue her second session of  vaccines.. ..will send fish and pcr for bcr-abl....pcr pos for p210....c/w cml......will follow.....cont gleevec....Pt knows to call should she experience any acute changes in interim. \par Disability paperwork and temporary handicapped parking paperwork done\par Recommended trying tonic water before bed to help alleviate the muscle spasm. \par Prescribed Magnesium BID \par First session of revaccination (Tdap, Inactivated polio, prevnar, hemophilus influenza B) due now. 2/15/18\par RTC in 8 weeks to see me and receive 2nd session of vaccine. \par \par 4/12/18\par see above...f/u fish and pcr\par Peripheral blood work stable and discussed with patient. \par Continue Gleevec maintenance\par Pt will be completing 2nd session of vaccines April 2018. Third session due in February 2019.\par RTC in 3 months - Pt knows to call should she experience any acute changes in interim. \par To be evaluated by pain specialist\par \par 12/13/18 \par see above...relapsed ph pos ALL......s/p re induction...discussed ponatinib to deepen response.....f/u fish and pcr\par Peripheral blood work stable and discussed with patient. PCR remains positive on pb...hx of subdural...no HA. \par Prescribed Ponatinib 45 mg QD  today - discussed rationale, risk, benefits and side effects of therapy. Patient verbalized understanding and expressed agreement with treatment plan. Also considering haplo from son once CR achieved\par Plan to repeat BM Bx in 6 weeks. \par 24 month vaccines due in February 2019.\par RTC in 2 weeks for follow up with QUAN Guaman and in 4 weeks for F/u with Dr. Bentley.  - Pt knows to call should she experience any acute changes in interim. \par To be evaluated by pain specialist\par \par

## 2018-12-17 NOTE — PROCEDURE
[Bone Marrow Biopsy] : bone marrow biopsy [Bone Marrow Aspiration] : bone marrow aspiration  [FreeTextEntry1] : 58 yrs old F ph+ ALL, s/p auto PSCT 2016, relapsed 2018, s/p treatment with  Inotuzamab access the progress the disease  [FreeTextEntry2] : procedure was canceled. Due to pt 's obesity, unable to reach the proper  land marking. Dr Bentley present during the procedure.

## 2018-12-19 ENCOUNTER — APPOINTMENT (OUTPATIENT)
Dept: PULMONOLOGY | Facility: CLINIC | Age: 58
End: 2018-12-19
Payer: MEDICARE

## 2018-12-19 ENCOUNTER — RESULT REVIEW (OUTPATIENT)
Age: 58
End: 2018-12-19

## 2018-12-19 ENCOUNTER — APPOINTMENT (OUTPATIENT)
Dept: HEMATOLOGY ONCOLOGY | Facility: CLINIC | Age: 58
End: 2018-12-19

## 2018-12-19 LAB
BASOPHILS # BLD AUTO: 0.1 K/UL — SIGNIFICANT CHANGE UP (ref 0–0.2)
BASOPHILS NFR BLD AUTO: 0.5 % — SIGNIFICANT CHANGE UP (ref 0–2)
BLD GP AB SCN SERPL QL: POSITIVE — SIGNIFICANT CHANGE UP
EOSINOPHIL # BLD AUTO: 0.4 K/UL — SIGNIFICANT CHANGE UP (ref 0–0.5)
EOSINOPHIL NFR BLD AUTO: 3.4 % — SIGNIFICANT CHANGE UP (ref 0–6)
HCT VFR BLD CALC: 37.9 % — SIGNIFICANT CHANGE UP (ref 34.5–45)
HGB BLD-MCNC: 12.7 G/DL — SIGNIFICANT CHANGE UP (ref 11.5–15.5)
LYMPHOCYTES # BLD AUTO: 2.6 K/UL — SIGNIFICANT CHANGE UP (ref 1–3.3)
LYMPHOCYTES # BLD AUTO: 21.7 % — SIGNIFICANT CHANGE UP (ref 13–44)
MCHC RBC-ENTMCNC: 33.5 G/DL — SIGNIFICANT CHANGE UP (ref 32–36)
MCHC RBC-ENTMCNC: 34.9 PG — HIGH (ref 27–34)
MCV RBC AUTO: 104 FL — HIGH (ref 80–100)
MONOCYTES # BLD AUTO: 1.4 K/UL — HIGH (ref 0–0.9)
MONOCYTES NFR BLD AUTO: 11.7 % — SIGNIFICANT CHANGE UP (ref 2–14)
NEUTROPHILS # BLD AUTO: 7.4 K/UL — SIGNIFICANT CHANGE UP (ref 1.8–7.4)
NEUTROPHILS NFR BLD AUTO: 62.7 % — SIGNIFICANT CHANGE UP (ref 43–77)
PLATELET # BLD AUTO: 72 K/UL — LOW (ref 150–400)
RBC # BLD: 3.64 M/UL — LOW (ref 3.8–5.2)
RBC # FLD: 16.3 % — HIGH (ref 10.3–14.5)
RH IG SCN BLD-IMP: POSITIVE — SIGNIFICANT CHANGE UP
WBC # BLD: 11.8 K/UL — HIGH (ref 3.8–10.5)
WBC # FLD AUTO: 11.8 K/UL — HIGH (ref 3.8–10.5)

## 2018-12-19 PROCEDURE — 94010 BREATHING CAPACITY TEST: CPT

## 2018-12-19 PROCEDURE — 94726 PLETHYSMOGRAPHY LUNG VOLUMES: CPT

## 2018-12-19 PROCEDURE — 94729 DIFFUSING CAPACITY: CPT

## 2018-12-20 ENCOUNTER — RESULT REVIEW (OUTPATIENT)
Age: 58
End: 2018-12-20

## 2018-12-20 LAB
ALBUMIN SERPL ELPH-MCNC: 4 G/DL
ALP BLD-CCNC: 142 U/L
ALT SERPL-CCNC: 25 U/L
ANION GAP SERPL CALC-SCNC: 11 MMOL/L
ANTIBODY ID 1_1: SIGNIFICANT CHANGE UP
AST SERPL-CCNC: 26 U/L
BILIRUB SERPL-MCNC: 0.6 MG/DL
BUN SERPL-MCNC: 8 MG/DL
CALCIUM SERPL-MCNC: 10.6 MG/DL
CHLORIDE SERPL-SCNC: 106 MMOL/L
CO2 SERPL-SCNC: 26 MMOL/L
CREAT SERPL-MCNC: 0.9 MG/DL
DAT C3-SP REAG RBC QL: NEGATIVE — SIGNIFICANT CHANGE UP
DAT POLY-SP REAG RBC QL: POSITIVE — SIGNIFICANT CHANGE UP
DIRECT COOMBS IGG: POSITIVE — SIGNIFICANT CHANGE UP
ELUATE ANTIBODY 1: SIGNIFICANT CHANGE UP
GLUCOSE SERPL-MCNC: 95 MG/DL
LDH SERPL-CCNC: 191 U/L
MAGNESIUM SERPL-MCNC: 2 MG/DL
POTASSIUM SERPL-SCNC: 4.4 MMOL/L
PROT SERPL-MCNC: 6.5 G/DL
SODIUM SERPL-SCNC: 143 MMOL/L

## 2018-12-20 PROCEDURE — 88271 CYTOGENETICS DNA PROBE: CPT

## 2018-12-20 PROCEDURE — 86077 PHYS BLOOD BANK SERV XMATCH: CPT

## 2018-12-20 PROCEDURE — 86860 RBC ANTIBODY ELUTION: CPT

## 2018-12-20 PROCEDURE — 86880 COOMBS TEST DIRECT: CPT

## 2018-12-20 PROCEDURE — 86922 COMPATIBILITY TEST ANTIGLOB: CPT

## 2018-12-20 PROCEDURE — 86900 BLOOD TYPING SEROLOGIC ABO: CPT

## 2018-12-20 PROCEDURE — 86901 BLOOD TYPING SEROLOGIC RH(D): CPT

## 2018-12-20 PROCEDURE — 81206 BCR/ABL1 GENE MAJOR BP: CPT

## 2018-12-20 PROCEDURE — 88275 CYTOGENETICS 100-300: CPT

## 2018-12-20 PROCEDURE — 86850 RBC ANTIBODY SCREEN: CPT

## 2018-12-20 PROCEDURE — 88237 TISSUE CULTURE BONE MARROW: CPT

## 2018-12-20 PROCEDURE — 86870 RBC ANTIBODY IDENTIFICATION: CPT

## 2018-12-24 ENCOUNTER — OUTPATIENT (OUTPATIENT)
Dept: OUTPATIENT SERVICES | Facility: HOSPITAL | Age: 58
LOS: 1 days | Discharge: HOME | End: 2018-12-24
Payer: MEDICARE

## 2018-12-24 DIAGNOSIS — Z98.89 OTHER SPECIFIED POSTPROCEDURAL STATES: Chronic | ICD-10-CM

## 2018-12-24 DIAGNOSIS — D17.9 BENIGN LIPOMATOUS NEOPLASM, UNSPECIFIED: Chronic | ICD-10-CM

## 2018-12-24 DIAGNOSIS — Z41.9 ENCOUNTER FOR PROCEDURE FOR PURPOSES OTHER THAN REMEDYING HEALTH STATE, UNSPECIFIED: Chronic | ICD-10-CM

## 2018-12-26 ENCOUNTER — APPOINTMENT (OUTPATIENT)
Dept: RADIATION ONCOLOGY | Facility: CLINIC | Age: 58
End: 2018-12-26
Payer: MEDICARE

## 2018-12-26 VITALS
TEMPERATURE: 97.9 F | SYSTOLIC BLOOD PRESSURE: 178 MMHG | BODY MASS INDEX: 42.27 KG/M2 | WEIGHT: 238.54 LBS | HEART RATE: 58 BPM | OXYGEN SATURATION: 100 % | RESPIRATION RATE: 16 BRPM | DIASTOLIC BLOOD PRESSURE: 68 MMHG | HEIGHT: 62.8 IN

## 2018-12-26 DIAGNOSIS — Z92.21 PERSONAL HISTORY OF ANTINEOPLASTIC CHEMOTHERAPY: ICD-10-CM

## 2018-12-26 PROCEDURE — 99203 OFFICE O/P NEW LOW 30 MIN: CPT | Mod: 25

## 2018-12-26 NOTE — VITALS
[Maximal Pain Intensity: 0/10] : 0/10 [Least Pain Intensity: 0/10] : 0/10 [80: Normal activity with effort; some signs or symptoms of disease.] : 80: Normal activity with effort; some signs or symptoms of disease.  [ECOG Performance Status: 0 - Fully active, able to carry on all pre-disease performance without restriction] : Performance Status: 0 - Fully active, able to carry on all pre-disease performance without restriction [Date: ____________] : Patient's last distress assessment performed on [unfilled]. [3 - Distress Level] : Distress Level: 3

## 2018-12-27 ENCOUNTER — APPOINTMENT (OUTPATIENT)
Dept: HEMATOLOGY ONCOLOGY | Facility: CLINIC | Age: 58
End: 2018-12-27

## 2018-12-28 ENCOUNTER — APPOINTMENT (OUTPATIENT)
Dept: HEMATOLOGY ONCOLOGY | Facility: CLINIC | Age: 58
End: 2018-12-28

## 2018-12-28 NOTE — DISEASE MANAGEMENT
[Clinical] : TNM Stage: c [N/A] : Currently not applicable [FreeTextEntry4] : ALL [TTNM] : x [NTNM] : x [MTNM] : x

## 2018-12-28 NOTE — REVIEW OF SYSTEMS
[Fatigue] : fatigue [Dysphagia] : dysphagia [Easy Bruising] : a tendency for easy bruising [Negative] : Allergic/Immunologic [Constipation: Grade 0] : Constipation: Grade 0 [Diarrhea: Grade 0] : Diarrhea: Grade 0 [Nausea: Grade 1 - Loss of appetite without alteration in eating habits] : Nausea: Grade 1 - Loss of appetite without alteration in eating habits [Vomiting: Grade 0] : Vomiting: Grade 0 [Fatigue: Grade 1 - Fatigue relieved by rest] : Fatigue: Grade 1 - Fatigue relieved by rest [Cough: Grade 0] : Cough: Grade 0 [Dyspnea: Grade 0] : Dyspnea: Grade 0 [FreeTextEntry9] : cane

## 2018-12-28 NOTE — HISTORY OF PRESENT ILLNESS
[FreeTextEntry1] : Ms. 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 \par Was able to successfully collect her stem cell while inpt on 11/8 and 11/9. \par s/p Masha-Auto on 12/16/16. Hospital course complicated by pancytopenia and fungal PNA. 12/22 CT chest revealed new 1cm right lung nodule and was started on voriconazole. Also +RVP for rhinovirus/enterovirus. Demonstrated engraftment on 12/26 and d/c'ed in stable condition on 1/3/17.\par \par On 4/11/2018 the patient had a bone marrow biopsy, cytogenetics, aspiration, FISH, flow cytometry revealing BCR-ABL1: 0.073% on the International Scale. The specimen analyzed contains the BCR-ABL1 translocation (p210) associated with Chronic Myelogenous Leukemia. FISH Result: negative for BCR/ABL1 rearrangement. Cultures from the above specimen did not yield any metaphase spreads for cytogenetic analysis. This is most likely related to the quality of the specimen and/or the clinical condition of the patient.\par \par In November 2018 the patient was hospitalized for relapsed disease and cns bleeding s/p two cycles of inotuzimab. PCR on pb still positive. The patient was seen at bedside for BM Bx, however procedure was unsuccessful. \par \par since her last visit with Dr. Bentley, she has been evaluated at [facility] with headache, CT imaging was negative. She was asked to make f/u appointments for a temporal artery biopsy and rheumatology follow up..  kiran, she is again feeling not well with cold like symptoms. [place] for a couple days.. She has a sore throat and is achy all over. not on antibiotics at this time. no sob and slight dry cough.\par \par

## 2019-01-02 PROCEDURE — 77470 SPECIAL RADIATION TREATMENT: CPT | Mod: 26

## 2019-01-09 ENCOUNTER — RESULT REVIEW (OUTPATIENT)
Age: 59
End: 2019-01-09

## 2019-01-09 ENCOUNTER — APPOINTMENT (OUTPATIENT)
Dept: HEMATOLOGY ONCOLOGY | Facility: CLINIC | Age: 59
End: 2019-01-09
Payer: MEDICARE

## 2019-01-09 VITALS
BODY MASS INDEX: 42.84 KG/M2 | TEMPERATURE: 98.1 F | HEART RATE: 75 BPM | WEIGHT: 240.3 LBS | OXYGEN SATURATION: 98 % | DIASTOLIC BLOOD PRESSURE: 97 MMHG | RESPIRATION RATE: 16 BRPM | SYSTOLIC BLOOD PRESSURE: 156 MMHG

## 2019-01-09 LAB
BASOPHILS # BLD AUTO: 0.1 K/UL — SIGNIFICANT CHANGE UP (ref 0–0.2)
BASOPHILS NFR BLD AUTO: 0.9 % — SIGNIFICANT CHANGE UP (ref 0–2)
EOSINOPHIL # BLD AUTO: 0.2 K/UL — SIGNIFICANT CHANGE UP (ref 0–0.5)
EOSINOPHIL NFR BLD AUTO: 2.4 % — SIGNIFICANT CHANGE UP (ref 0–6)
HCT VFR BLD CALC: 39.9 % — SIGNIFICANT CHANGE UP (ref 34.5–45)
HGB BLD-MCNC: 13.8 G/DL — SIGNIFICANT CHANGE UP (ref 11.5–15.5)
LYMPHOCYTES # BLD AUTO: 3.1 K/UL — SIGNIFICANT CHANGE UP (ref 1–3.3)
LYMPHOCYTES # BLD AUTO: 32.2 % — SIGNIFICANT CHANGE UP (ref 13–44)
MCHC RBC-ENTMCNC: 34.5 G/DL — SIGNIFICANT CHANGE UP (ref 32–36)
MCHC RBC-ENTMCNC: 35.6 PG — HIGH (ref 27–34)
MCV RBC AUTO: 103 FL — HIGH (ref 80–100)
MONOCYTES # BLD AUTO: 0.9 K/UL — SIGNIFICANT CHANGE UP (ref 0–0.9)
MONOCYTES NFR BLD AUTO: 9.4 % — SIGNIFICANT CHANGE UP (ref 2–14)
NEUTROPHILS # BLD AUTO: 5.3 K/UL — SIGNIFICANT CHANGE UP (ref 1.8–7.4)
NEUTROPHILS NFR BLD AUTO: 55.1 % — SIGNIFICANT CHANGE UP (ref 43–77)
PLATELET # BLD AUTO: 44 K/UL — LOW (ref 150–400)
RBC # BLD: 3.86 M/UL — SIGNIFICANT CHANGE UP (ref 3.8–5.2)
RBC # FLD: 14 % — SIGNIFICANT CHANGE UP (ref 10.3–14.5)
WBC # BLD: 9.6 K/UL — SIGNIFICANT CHANGE UP (ref 3.8–10.5)
WBC # FLD AUTO: 9.6 K/UL — SIGNIFICANT CHANGE UP (ref 3.8–10.5)

## 2019-01-09 PROCEDURE — 99215 OFFICE O/P EST HI 40 MIN: CPT

## 2019-01-10 LAB
ALBUMIN SERPL ELPH-MCNC: 4.2 G/DL
ALP BLD-CCNC: 328 U/L
ALT SERPL-CCNC: 80 U/L
ANION GAP SERPL CALC-SCNC: 12 MMOL/L
AST SERPL-CCNC: 56 U/L
BILIRUB SERPL-MCNC: 0.3 MG/DL
BUN SERPL-MCNC: 11 MG/DL
CALCIUM SERPL-MCNC: 10.8 MG/DL
CHLORIDE SERPL-SCNC: 105 MMOL/L
CO2 SERPL-SCNC: 24 MMOL/L
CREAT SERPL-MCNC: 0.73 MG/DL
GLUCOSE SERPL-MCNC: 81 MG/DL
LDH SERPL-CCNC: 254 U/L
MAGNESIUM SERPL-MCNC: 2.4 MG/DL
POTASSIUM SERPL-SCNC: 4.9 MMOL/L
PROT SERPL-MCNC: 6.8 G/DL
SODIUM SERPL-SCNC: 141 MMOL/L

## 2019-01-10 NOTE — HISTORY OF PRESENT ILLNESS
[de-identified] : the patient was dx with ph+ ALL in april of this year..she initilly presented to [facility] and was transferred to [facility] where she received R HyperCVAD. Her induction course was complicated by tutu fever. She received cycle 2 mid may. BM BX prior was morphologically remission but pcr was positive. Cycle 4 was completed. Prolonged course with fever, pericardial effusion, possible fungal pna. I suspect fever and effusion due to sprycel. She also had neurologic / mental status changes after MTX. She has an omaya. She had two MTX IT. She feels well today.  s/p step 1 for stem cell mobilization..in CR1..S/P auto stem cell transplant with tam 200 mg/m2 prep...\par \par 10/4/18 - 11/20/18 : 58F hx HTN, Obesity, Ph(+) ALL s/p R Hypercavd x4 cycles IT MTX x2 s/p stem cell collection w/ Step 1(HD vp16 plus arac) stem cell mobilization regime. \par FISH and PCR negative. s/p Tam-Auto on 12/16/16. Patient prior to presentation had severe burning right abdominal pain with vesicular lesions and was diagnosed with shingles and received a 10 day course of antiviral medications. \par Patient on presentation had residual neuropathic pain.  Patient presented to the ER with severe occipital parietal headaches with nausea and vomiting. \par Patient also admitted to easy bruising mouth sores and dark stools. \par CT Head was done for complaints of headache which was (+) for SDH. Neurosurgery was consulted on initial detection of SDH, recommendations to keep platelets >80,000 was made.  LGIB was attributed to profound thrombocytopenia. 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 A PICC line was placed for chemotherapy and was initiated with Inotuzamab on Day 1, 8 and 15. IVF and Allopurinol for TLS. The patient has had refractory thrombocytopenia. H L A platelets were infused when they were available. when H L A platelets were not available 1/2 unit of platelet was infused over 3 hours. \par Follow up CT Head on 10/11 was stable and the patient continued to receive 1/2 units of platelets over 3 hours every 12 hours. The patient received last dose of Inotuzamab Cycle 1 on 10/22. On 10/15 patient was febrile,  a CT of the chest/abd/pelvis was done which showed scattered patchy ground glass opacities in right upper and lower lobes, suggestive of infection, patient received a course of IV antibiotics for Pneumonia. On 11/2 Blood cultures were found to be (+) for Co-agulase (-) Staph and patient received a course of Vancomycin. \par Repeat CT Head was completed on 10/23 for follow up and showed some new bleeding into previous collection. Findings were discussed with Neuro Surgery. \par HLA platelets were administered when available for refractory thrombocytopenia. \par Cycle 2 Inotuzumab was started on 11/6 which was given on Days 1, 8, 15. \par Patient tolerated second cycle without any adverse reactions. Patient for possible future Haploidentical Allogeneic stem cell transplant after discharge. \par Pre-BMT testing completed prior to discharge: Echo, MUGA, Xray sinuses, 24-Hr Urine for Creatinine. \par Patient had intermittent severe headaches on 11/3 and 11/15, repeat CTs showed nearly resolved hemorrhage. Headaches were managed with analgesics Fentanyl patch 37 mcg/hr and Oxycodone IR 15 mg q3 prn prior to discharge home. Patient was advised to follow with Dr. Bentley as outpatient.  [de-identified] : Presenting for a follow up AlloPSCT consult. s/p one day hospital admission at [place] with cc of . Notes hyperpigmentation on LUE, states it was erythematous without pruritus then became hyperpigmented. Reports injected eyes bilaterally. Continues to require a cane to ambulate. In compliance with her medication. Notes disrupted sleep. Is visibly anxious and nervous regarding the whole process. Her son Ponce is a the best donor match. Denies nausea, vomiting, diarrhea, chest pain, SOB or b/l LE edema. Will continue to follow up with pain-management specialists as needed.

## 2019-01-10 NOTE — ASSESSMENT
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  Recent BM BX was c/w remission. Fish and pcr were negative. Step 1 complicated by hospitalization for neutropenic fevers and c-diff diarrhea 11/1-11/11. \par Was able to successfully collect her stem cell while inpt on 11/8 and 11/9. \par Now s/p Masha-Auto on 12/16/16. Hospital course complicated by pancytopenia and fungal PNA. 12/22 CT chest revealed new 1cm right lung nodule and was started on voriconazole. Also +RVP for rhinovirus/enterovirus. Demonstrated engraftment on 12/26 and d/c'ed in stable condition on 1/3/17.\par \par Now relapsed ph pos ALL......s/p re induction with inotuzimab...on Ponatinib to deepen response.....f/u fish and pcr\par Peripheral blood work stable and discussed with patient. PCR remains positive on pb...hx of subdural...no HA. \par Continue Ponatinib 45 mg QD  today - discussed rationale, risk, benefits and side effects of therapy. Patient verbalized understanding and expressed agreement with treatment plan. \par Repeat blood work next week. \par Plan to repeat BM Bx on 1/23/19.  \par Radiation consult completed. \par 24 month vaccines due in February 2019 from auto  . - on hold for now. \par Will schedule Donor clearance for neelima Serra. \par \par I have reviewed with the patient the rationale, risks and benefits of treatment with chemotherapy alone vs.chemotherapy plus haplo-transplant. \par \par I have also reviewed the pre-transplant testing for vital organ testing including, but not limited to ECHO, MUGA, PFTs, Urine analysis, BM Bx, LFTs and Dental evaluation. All pretesting completed. \par \par I discussed with the patient, the role of a donor. I have reviewed with the patient the donor screening process including a physical exam, HLA typing and antibody testing, and the stem cell collection process. I discussed the role of Neupogen for 5 days prior to obtaining the specimen. Side effects including but not limited to fever, body aches from Neupogen injections was discussed. \par \par I have also discussed the process of apheresis as a way to collect the peripheral stem cells. Anticoagulation with Citrate during apheresis was discussed, along with side effects including but not limited to hypocalcemia mild dysesthesias (most common) to tetany, seizures and cardiac arrhythmias. Treatment with oral or intravenous calcium supplementation in the setting of hypocalcemia was also discussed. \par \par I have discussed with the patient the pre-administration of Kepivance IV x 3 days, as a GI prophylaxis with the aim to prevent swelling, irritation, sores, and ulcers in the GI tract caused by high dose chemotherapy. Side effects including but not limited to flushing, itching from Kepivance were also discussed. \par \par I have discussed with the patient the four week hospital stay for the transplant. I have reviewed with the patient the initial chemotherapy with Fludarabine and Cytoxan as well as a single dose of radiation (TBI at 200cG) in the pre-transplant state. The patient will receive the stem cells by a triple lumen catheter following the radiation. I have discussed the role of high dose Cytoxan following the transplant on day 3 and day 4. I have discussed the role of CellCept x 30 days and Tacrolimus x 180 days in the post-transplant state as immunosuppressants. I have discussed the role of supportive care in the weeks following the transplant with the possibility of needing PRBCs, platelets, fluids, and/or electrolytes. \par \par I discussed with the patient, post-transplant precautions including the use of antiviral, antifungal, antibiotics, and immunosuppressant. I discussed with the patient post-transplant complications including but not limited to bleeding, infection, end-organ damage, mucositis, veno-occlusive disease, graft failure, graft rejection, progression of disease as well as GVHD (both acute and chronic). Possible symptoms of GVHD including, but not limited to, rash, diarrhea, nausea, vomiting, mouth sores, liver inflammation were discussed. I informed the patient of post-discharge expectations as well, including fatigue and “flu-like” symptoms. Post-discharge precautions including diet and crowd control discussed as well. I reviewed with the patient the risks and benefits of this procedure. I reviewed the timing of the transplant with the patient. \par \par I informed the patient that there is a 15% risk of a catastrophic event to occur, including but not limited to relapse, graft rejection, severe infection and death is during the first year post-transplant. Also, discussed with the patient that the risk of relapse decreases with each additional year post-transplant, and that at the 5 year rhina cure may be achieved. \par \par Literature and consents for Haplo-transplant were provided to the patient for review. Patient to consider option. All questions were addressed and emotional support provided.\par \par Follow up the first week of Feb 2019, and BM Bx path obtained. \par Hope to admit soon after\par Discussed with patient that she does have HLA antibodies against her son....slightly above our cutoff...however given the gravity of her disease and no other donor options we will proceed

## 2019-01-10 NOTE — REVIEW OF SYSTEMS
[Negative] : Allergic/Immunologic [Fatigue] : no fatigue [Cough] : no cough [Muscle Weakness] : no muscle weakness [FreeTextEntry9] : back pain [de-identified] : muscle spasms

## 2019-01-10 NOTE — ADDENDUM
[FreeTextEntry1] : Documented by Maria Guadalupe Berrios acting as a scribe for Dr. Luke Bentley on 1/9/2019.\par \par All medical record entries made by the Scribe were at my, Dr. Luke Bentley's, direction and personally dictated by me on 1/9/2019. I have reviewed the chart and agree that  the record accurately reflects my personal performance of the history, physical exam, assessment and plan. I have also personally directed, reviewed, and agree with the discharge instructions.\par

## 2019-01-15 ENCOUNTER — OUTPATIENT (OUTPATIENT)
Dept: OUTPATIENT SERVICES | Facility: HOSPITAL | Age: 59
LOS: 1 days | End: 2019-01-15
Payer: MEDICARE

## 2019-01-15 ENCOUNTER — RESULT REVIEW (OUTPATIENT)
Age: 59
End: 2019-01-15

## 2019-01-15 ENCOUNTER — APPOINTMENT (OUTPATIENT)
Dept: HEMATOLOGY ONCOLOGY | Facility: CLINIC | Age: 59
End: 2019-01-15

## 2019-01-15 DIAGNOSIS — Z41.9 ENCOUNTER FOR PROCEDURE FOR PURPOSES OTHER THAN REMEDYING HEALTH STATE, UNSPECIFIED: Chronic | ICD-10-CM

## 2019-01-15 DIAGNOSIS — D17.9 BENIGN LIPOMATOUS NEOPLASM, UNSPECIFIED: Chronic | ICD-10-CM

## 2019-01-15 DIAGNOSIS — Z98.89 OTHER SPECIFIED POSTPROCEDURAL STATES: Chronic | ICD-10-CM

## 2019-01-15 DIAGNOSIS — C91.00 ACUTE LYMPHOBLASTIC LEUKEMIA NOT HAVING ACHIEVED REMISSION: ICD-10-CM

## 2019-01-15 LAB
ALBUMIN SERPL ELPH-MCNC: 4 G/DL
ALP BLD-CCNC: 371 U/L
ALT SERPL-CCNC: 116 U/L
ANION GAP SERPL CALC-SCNC: 14 MMOL/L
AST SERPL-CCNC: 68 U/L
BASOPHILS # BLD AUTO: 0.1 K/UL — SIGNIFICANT CHANGE UP (ref 0–0.2)
BASOPHILS NFR BLD AUTO: 0.9 % — SIGNIFICANT CHANGE UP (ref 0–2)
BILIRUB SERPL-MCNC: 0.2 MG/DL
BLD GP AB SCN SERPL QL: POSITIVE — SIGNIFICANT CHANGE UP
BUN SERPL-MCNC: 15 MG/DL
CALCIUM SERPL-MCNC: 10.2 MG/DL
CHLORIDE SERPL-SCNC: 108 MMOL/L
CO2 SERPL-SCNC: 24 MMOL/L
CREAT SERPL-MCNC: 0.73 MG/DL
EOSINOPHIL # BLD AUTO: 0.3 K/UL — SIGNIFICANT CHANGE UP (ref 0–0.5)
EOSINOPHIL NFR BLD AUTO: 3.2 % — SIGNIFICANT CHANGE UP (ref 0–6)
GLUCOSE SERPL-MCNC: 79 MG/DL
HCT VFR BLD CALC: 41.8 % — SIGNIFICANT CHANGE UP (ref 34.5–45)
HGB BLD-MCNC: 14 G/DL — SIGNIFICANT CHANGE UP (ref 11.5–15.5)
LDH SERPL-CCNC: 201 U/L
LYMPHOCYTES # BLD AUTO: 2.6 K/UL — SIGNIFICANT CHANGE UP (ref 1–3.3)
LYMPHOCYTES # BLD AUTO: 30.6 % — SIGNIFICANT CHANGE UP (ref 13–44)
MAGNESIUM SERPL-MCNC: 2.3 MG/DL
MCHC RBC-ENTMCNC: 33.6 G/DL — SIGNIFICANT CHANGE UP (ref 32–36)
MCHC RBC-ENTMCNC: 34.8 PG — HIGH (ref 27–34)
MCV RBC AUTO: 104 FL — HIGH (ref 80–100)
MONOCYTES # BLD AUTO: 0.8 K/UL — SIGNIFICANT CHANGE UP (ref 0–0.9)
MONOCYTES NFR BLD AUTO: 9.2 % — SIGNIFICANT CHANGE UP (ref 2–14)
NEUTROPHILS # BLD AUTO: 4.8 K/UL — SIGNIFICANT CHANGE UP (ref 1.8–7.4)
NEUTROPHILS NFR BLD AUTO: 56.1 % — SIGNIFICANT CHANGE UP (ref 43–77)
PLATELET # BLD AUTO: 56 K/UL — LOW (ref 150–400)
POTASSIUM SERPL-SCNC: 4.8 MMOL/L
PROT SERPL-MCNC: 6.5 G/DL
RBC # BLD: 4.03 M/UL — SIGNIFICANT CHANGE UP (ref 3.8–5.2)
RBC # FLD: 13.6 % — SIGNIFICANT CHANGE UP (ref 10.3–14.5)
RH IG SCN BLD-IMP: POSITIVE — SIGNIFICANT CHANGE UP
SODIUM SERPL-SCNC: 146 MMOL/L
WBC # BLD: 8.5 K/UL — SIGNIFICANT CHANGE UP (ref 3.8–10.5)
WBC # FLD AUTO: 8.5 K/UL — SIGNIFICANT CHANGE UP (ref 3.8–10.5)

## 2019-01-16 ENCOUNTER — OUTPATIENT (OUTPATIENT)
Dept: OUTPATIENT SERVICES | Facility: HOSPITAL | Age: 59
LOS: 1 days | Discharge: HOME | End: 2019-01-16

## 2019-01-16 DIAGNOSIS — Z41.9 ENCOUNTER FOR PROCEDURE FOR PURPOSES OTHER THAN REMEDYING HEALTH STATE, UNSPECIFIED: Chronic | ICD-10-CM

## 2019-01-16 DIAGNOSIS — D17.9 BENIGN LIPOMATOUS NEOPLASM, UNSPECIFIED: Chronic | ICD-10-CM

## 2019-01-16 DIAGNOSIS — C91.00 ACUTE LYMPHOBLASTIC LEUKEMIA NOT HAVING ACHIEVED REMISSION: ICD-10-CM

## 2019-01-16 DIAGNOSIS — Z98.89 OTHER SPECIFIED POSTPROCEDURAL STATES: Chronic | ICD-10-CM

## 2019-01-18 ENCOUNTER — MESSAGE (OUTPATIENT)
Age: 59
End: 2019-01-18

## 2019-01-23 ENCOUNTER — APPOINTMENT (OUTPATIENT)
Dept: HEMATOLOGY ONCOLOGY | Facility: CLINIC | Age: 59
End: 2019-01-23

## 2019-01-23 ENCOUNTER — APPOINTMENT (OUTPATIENT)
Dept: INFUSION THERAPY | Facility: HOSPITAL | Age: 59
End: 2019-01-23

## 2019-01-23 ENCOUNTER — RESULT REVIEW (OUTPATIENT)
Age: 59
End: 2019-01-23

## 2019-01-23 ENCOUNTER — APPOINTMENT (OUTPATIENT)
Dept: HEMATOLOGY ONCOLOGY | Facility: CLINIC | Age: 59
End: 2019-01-23
Payer: MEDICARE

## 2019-01-23 VITALS
DIASTOLIC BLOOD PRESSURE: 94 MMHG | RESPIRATION RATE: 16 BRPM | TEMPERATURE: 98.2 F | HEART RATE: 90 BPM | WEIGHT: 242.5 LBS | BODY MASS INDEX: 43.23 KG/M2 | OXYGEN SATURATION: 99 % | SYSTOLIC BLOOD PRESSURE: 145 MMHG

## 2019-01-23 LAB
ALBUMIN SERPL ELPH-MCNC: 4.1 G/DL
ALP BLD-CCNC: 389 U/L
ALT SERPL-CCNC: 117 U/L
ANION GAP SERPL CALC-SCNC: 11 MMOL/L
AST SERPL-CCNC: 69 U/L
BASOPHILS # BLD AUTO: 0.1 K/UL — SIGNIFICANT CHANGE UP (ref 0–0.2)
BASOPHILS NFR BLD AUTO: 0.8 % — SIGNIFICANT CHANGE UP (ref 0–2)
BILIRUB SERPL-MCNC: 0.2 MG/DL
BUN SERPL-MCNC: 15 MG/DL
CALCIUM SERPL-MCNC: 10.4 MG/DL
CHLORIDE SERPL-SCNC: 106 MMOL/L
CO2 SERPL-SCNC: 26 MMOL/L
CREAT SERPL-MCNC: 0.69 MG/DL
EOSINOPHIL # BLD AUTO: 0.1 K/UL — SIGNIFICANT CHANGE UP (ref 0–0.5)
EOSINOPHIL NFR BLD AUTO: 1.4 % — SIGNIFICANT CHANGE UP (ref 0–6)
GLUCOSE SERPL-MCNC: 80 MG/DL
HCT VFR BLD CALC: 41.5 % — SIGNIFICANT CHANGE UP (ref 34.5–45)
HGB BLD-MCNC: 13.7 G/DL — SIGNIFICANT CHANGE UP (ref 11.5–15.5)
LDH SERPL-CCNC: 288 U/L
LYMPHOCYTES # BLD AUTO: 2.4 K/UL — SIGNIFICANT CHANGE UP (ref 1–3.3)
LYMPHOCYTES # BLD AUTO: 27.3 % — SIGNIFICANT CHANGE UP (ref 13–44)
MAGNESIUM SERPL-MCNC: 2.2 MG/DL
MCHC RBC-ENTMCNC: 33.1 G/DL — SIGNIFICANT CHANGE UP (ref 32–36)
MCHC RBC-ENTMCNC: 34.7 PG — HIGH (ref 27–34)
MCV RBC AUTO: 105 FL — HIGH (ref 80–100)
MONOCYTES # BLD AUTO: 0.8 K/UL — SIGNIFICANT CHANGE UP (ref 0–0.9)
MONOCYTES NFR BLD AUTO: 9.6 % — SIGNIFICANT CHANGE UP (ref 2–14)
NEUTROPHILS # BLD AUTO: 5.3 K/UL — SIGNIFICANT CHANGE UP (ref 1.8–7.4)
NEUTROPHILS NFR BLD AUTO: 60.9 % — SIGNIFICANT CHANGE UP (ref 43–77)
PLATELET # BLD AUTO: 67 K/UL — LOW (ref 150–400)
POTASSIUM SERPL-SCNC: 4.5 MMOL/L
PROT SERPL-MCNC: 6.4 G/DL
RBC # BLD: 3.96 M/UL — SIGNIFICANT CHANGE UP (ref 3.8–5.2)
RBC # FLD: 13.3 % — SIGNIFICANT CHANGE UP (ref 10.3–14.5)
SODIUM SERPL-SCNC: 143 MMOL/L
WBC # BLD: 8.7 K/UL — SIGNIFICANT CHANGE UP (ref 3.8–10.5)
WBC # FLD AUTO: 8.7 K/UL — SIGNIFICANT CHANGE UP (ref 3.8–10.5)

## 2019-01-23 PROCEDURE — 38222 DX BONE MARROW BX & ASPIR: CPT | Mod: LT

## 2019-01-23 PROCEDURE — 88341 IMHCHEM/IMCYTCHM EA ADD ANTB: CPT | Mod: 26,59

## 2019-01-23 PROCEDURE — 85097 BONE MARROW INTERPRETATION: CPT

## 2019-01-23 PROCEDURE — 88291 CYTO/MOLECULAR REPORT: CPT | Mod: 59

## 2019-01-23 PROCEDURE — 88342 IMHCHEM/IMCYTCHM 1ST ANTB: CPT | Mod: 26,59

## 2019-01-23 PROCEDURE — 88313 SPECIAL STAINS GROUP 2: CPT | Mod: 26

## 2019-01-23 PROCEDURE — 88305 TISSUE EXAM BY PATHOLOGIST: CPT | Mod: 26

## 2019-01-23 PROCEDURE — G0452: CPT | Mod: 26,59

## 2019-01-23 PROCEDURE — 88189 FLOWCYTOMETRY/READ 16 & >: CPT

## 2019-01-23 PROCEDURE — 88360 TUMOR IMMUNOHISTOCHEM/MANUAL: CPT | Mod: 26

## 2019-01-23 NOTE — REASON FOR VISIT
[Bone Marrow Biopsy] : bone marrow biopsy [Bone Marrow Aspiration] : bone marrow aspiration [FreeTextEntry2] : PH (+) ALL, s/p DOMENICO AUTO PSCT 12/16/16.  Relapse ; s/p reinduction with inotuzumab.  BMBX pre Haplo transplant.

## 2019-01-23 NOTE — PROCEDURE
[Bone Marrow Biopsy] : bone marrow biopsy [Bone Marrow Aspiration] : bone marrow aspiration  [Patient] : the patient [Patient identification verified] : patient identification verified [Procedure verified and consent obtained] : procedure verified and consent obtained [Correct positioning] : correct positioning [Prone] : prone [The left posterior iliac crest was prepped with betadine and draped, using sterile technique.] : The left posterior iliac crest was prepped with betadine and draped, using sterile technique. [Lidocaine was injected and into the periosteum overlying the site.] : Lidocaine was injected and into the periosteum overlying the site. [Aspirate] : aspirate [Cytogenetics] : cytogenetics [FISH] : FISH [PCR] : PCR [Other ___] : [unfilled] [Biopsy] : biopsy [Flow Cytometry] : flow cytometry [] : The patient was instructed to remove the bandage the following AM. The patient may bathe. Acetaminophen may be taken for discomfort, as per package directions.If there are any other problems, the patient was instructed to call the office. The patient verbalized understanding, and is aware of the office contact numbers. [FreeTextEntry1] : PH (+) ALL, s/p DOMENICO AUTO PSCT 12/16/16.  Relapse ; s/p reinduction with inotuzumab.  BMBX pre Haplo transplant. [FreeTextEntry2] : CBC prior to procedure:\par WBC  8.7 \par HGB   13.7   HCT   41.5\par PLTS  67\par Pt to treatment room for premed of lorazepam 1mg.

## 2019-01-24 DIAGNOSIS — R52 PAIN, UNSPECIFIED: ICD-10-CM

## 2019-01-24 DIAGNOSIS — R11.2 NAUSEA WITH VOMITING, UNSPECIFIED: ICD-10-CM

## 2019-01-24 LAB — TM INTERPRETATION: SIGNIFICANT CHANGE UP

## 2019-01-25 ENCOUNTER — RESULT REVIEW (OUTPATIENT)
Age: 59
End: 2019-01-25

## 2019-01-25 LAB
CHROM ANALY INTERPHASE BLD FISH-IMP: SIGNIFICANT CHANGE UP
HEMATOPATHOLOGY REPORT: SIGNIFICANT CHANGE UP

## 2019-01-28 LAB — BCR/ABL BY RT - PCR QUANTITATIVE: SIGNIFICANT CHANGE UP

## 2019-01-29 ENCOUNTER — RESULT REVIEW (OUTPATIENT)
Age: 59
End: 2019-01-29

## 2019-01-29 PROCEDURE — 77263 THER RADIOLOGY TX PLNG CPLX: CPT

## 2019-01-31 ENCOUNTER — LABORATORY RESULT (OUTPATIENT)
Age: 59
End: 2019-01-31

## 2019-01-31 ENCOUNTER — RESULT REVIEW (OUTPATIENT)
Age: 59
End: 2019-01-31

## 2019-01-31 ENCOUNTER — APPOINTMENT (OUTPATIENT)
Dept: HEMATOLOGY ONCOLOGY | Facility: CLINIC | Age: 59
End: 2019-01-31
Payer: MEDICARE

## 2019-01-31 VITALS
BODY MASS INDEX: 43.32 KG/M2 | HEART RATE: 80 BPM | WEIGHT: 242.99 LBS | RESPIRATION RATE: 16 BRPM | SYSTOLIC BLOOD PRESSURE: 144 MMHG | OXYGEN SATURATION: 100 % | TEMPERATURE: 98.1 F | DIASTOLIC BLOOD PRESSURE: 89 MMHG

## 2019-01-31 DIAGNOSIS — Z01.818 ENCOUNTER FOR OTHER PREPROCEDURAL EXAMINATION: ICD-10-CM

## 2019-01-31 LAB
ALBUMIN SERPL ELPH-MCNC: 4.2 G/DL
ALP BLD-CCNC: 405 U/L
ALT SERPL-CCNC: 85 U/L
ANION GAP SERPL CALC-SCNC: 11 MMOL/L
AST SERPL-CCNC: 44 U/L
BASOPHILS # BLD AUTO: 0.1 K/UL — SIGNIFICANT CHANGE UP (ref 0–0.2)
BASOPHILS NFR BLD AUTO: 0.8 % — SIGNIFICANT CHANGE UP (ref 0–2)
BILIRUB SERPL-MCNC: 0.2 MG/DL
BUN SERPL-MCNC: 13 MG/DL
CALCIUM SERPL-MCNC: 10.9 MG/DL
CHLORIDE SERPL-SCNC: 104 MMOL/L
CO2 SERPL-SCNC: 26 MMOL/L
CREAT SERPL-MCNC: 0.73 MG/DL
EOSINOPHIL # BLD AUTO: 0.1 K/UL — SIGNIFICANT CHANGE UP (ref 0–0.5)
EOSINOPHIL NFR BLD AUTO: 0.8 % — SIGNIFICANT CHANGE UP (ref 0–6)
GLUCOSE SERPL-MCNC: 99 MG/DL
HCT VFR BLD CALC: 42.3 % — SIGNIFICANT CHANGE UP (ref 34.5–45)
HGB BLD-MCNC: 14.1 G/DL — SIGNIFICANT CHANGE UP (ref 11.5–15.5)
LDH SERPL-CCNC: 200 U/L
LYMPHOCYTES # BLD AUTO: 1.8 K/UL — SIGNIFICANT CHANGE UP (ref 1–3.3)
LYMPHOCYTES # BLD AUTO: 24.7 % — SIGNIFICANT CHANGE UP (ref 13–44)
MAGNESIUM SERPL-MCNC: 2.5 MG/DL
MCHC RBC-ENTMCNC: 33.3 G/DL — SIGNIFICANT CHANGE UP (ref 32–36)
MCHC RBC-ENTMCNC: 34.9 PG — HIGH (ref 27–34)
MCV RBC AUTO: 105 FL — HIGH (ref 80–100)
MONOCYTES # BLD AUTO: 0.3 K/UL — SIGNIFICANT CHANGE UP (ref 0–0.9)
MONOCYTES NFR BLD AUTO: 3.7 % — SIGNIFICANT CHANGE UP (ref 2–14)
NEUTROPHILS # BLD AUTO: 5.2 K/UL — SIGNIFICANT CHANGE UP (ref 1.8–7.4)
NEUTROPHILS NFR BLD AUTO: 70 % — SIGNIFICANT CHANGE UP (ref 43–77)
PLATELET # BLD AUTO: 69 K/UL — LOW (ref 150–400)
POTASSIUM SERPL-SCNC: 4.6 MMOL/L
PROT SERPL-MCNC: 6.6 G/DL
RBC # BLD: 4.04 M/UL — SIGNIFICANT CHANGE UP (ref 3.8–5.2)
RBC # FLD: 13 % — SIGNIFICANT CHANGE UP (ref 10.3–14.5)
SODIUM SERPL-SCNC: 141 MMOL/L
WBC # BLD: 7.5 K/UL — SIGNIFICANT CHANGE UP (ref 3.8–10.5)
WBC # FLD AUTO: 7.5 K/UL — SIGNIFICANT CHANGE UP (ref 3.8–10.5)

## 2019-01-31 PROCEDURE — 88313 SPECIAL STAINS GROUP 2: CPT

## 2019-01-31 PROCEDURE — 88280 CHROMOSOME KARYOTYPE STUDY: CPT

## 2019-01-31 PROCEDURE — 87205 SMEAR GRAM STAIN: CPT

## 2019-01-31 PROCEDURE — 88275 CYTOGENETICS 100-300: CPT

## 2019-01-31 PROCEDURE — 88342 IMHCHEM/IMCYTCHM 1ST ANTB: CPT

## 2019-01-31 PROCEDURE — 88271 CYTOGENETICS DNA PROBE: CPT

## 2019-01-31 PROCEDURE — 88291 CYTO/MOLECULAR REPORT: CPT | Mod: 59

## 2019-01-31 PROCEDURE — 88341 IMHCHEM/IMCYTCHM EA ADD ANTB: CPT

## 2019-01-31 PROCEDURE — 88305 TISSUE EXAM BY PATHOLOGIST: CPT

## 2019-01-31 PROCEDURE — 88237 TISSUE CULTURE BONE MARROW: CPT

## 2019-01-31 PROCEDURE — 88360 TUMOR IMMUNOHISTOCHEM/MANUAL: CPT

## 2019-01-31 PROCEDURE — 86850 RBC ANTIBODY SCREEN: CPT

## 2019-01-31 PROCEDURE — 86900 BLOOD TYPING SEROLOGIC ABO: CPT

## 2019-01-31 PROCEDURE — 88184 FLOWCYTOMETRY/ TC 1 MARKER: CPT

## 2019-01-31 PROCEDURE — 81206 BCR/ABL1 GENE MAJOR BP: CPT

## 2019-01-31 PROCEDURE — G0452: CPT | Mod: 26

## 2019-01-31 PROCEDURE — 99215 OFFICE O/P EST HI 40 MIN: CPT

## 2019-01-31 PROCEDURE — 88264 CHROMOSOME ANALYSIS 20-25: CPT

## 2019-01-31 PROCEDURE — 85097 BONE MARROW INTERPRETATION: CPT

## 2019-01-31 PROCEDURE — 88185 FLOWCYTOMETRY/TC ADD-ON: CPT

## 2019-01-31 PROCEDURE — 86901 BLOOD TYPING SEROLOGIC RH(D): CPT

## 2019-01-31 PROCEDURE — 86870 RBC ANTIBODY IDENTIFICATION: CPT

## 2019-02-01 PROCEDURE — 77290 THER RAD SIMULAJ FIELD CPLX: CPT | Mod: 26

## 2019-02-01 NOTE — PHYSICAL EXAM
[Ambulatory and capable of all self care but unable to carry out any work activities] : Status 2- Ambulatory and capable of all self care but unable to carry out any work activities. Up and about more than 50% of waking hours [Normal] : affect appropriate [de-identified] : stable gait

## 2019-02-01 NOTE — RESULTS/DATA
[FreeTextEntry1] : Bone marrow biopsy and bone marrow aspirate 1/23/19:\par -B- lymphoblastic leukemia/lymphoma in morphologic and immunophenotypic remission.\par

## 2019-02-01 NOTE — HISTORY OF PRESENT ILLNESS
[de-identified] : The patient was dx with ph+ ALL in april of this year..she initilly presented to [facility] and was transferred to [facility] where she received R HyperCVAD. Her induction course was complicated by tutu fever. She received cycle 2 mid may. BM BX prior was morphologically remission but pcr was positive. Cycle 4 was completed. Prolonged course with fever, pericardial effusion, possible fungal pna. I suspect fever and effusion due to sprycel. She also had neurologic / mental status changes after MTX. She has an omaya. She had two MTX IT. She feels well today.  s/p step 1 for stem cell mobilization..in CR1..S/P auto stem cell transplant with tam 200 mg/m2 prep...\par \par 10/4/18 - 11/20/18 : 58F hx HTN, Obesity, Ph(+) ALL s/p R Hypercavd x4 cycles IT MTX x2 s/p stem cell collection w/ Step 1(HD vp16 plus arac) stem cell mobilization regime. \par FISH and PCR negative. s/p Tam-Auto on 12/16/16. Patient prior to presentation had severe burning right abdominal pain with vesicular lesions and was diagnosed with shingles and received a 10 day course of antiviral medications. \par Patient on presentation had residual neuropathic pain.  Patient presented to the ER with severe occipital parietal headaches with nausea and vomiting. \par Patient also admitted to easy bruising mouth sores and dark stools. \par CT Head was done for complaints of headache which was (+) for SDH. Neurosurgery was consulted on initial detection of SDH, recommendations to keep platelets >80,000 was made.  LGIB was attributed to profound thrombocytopenia. 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 A PICC line was placed for chemotherapy and was initiated with Inotuzamab on Day 1, 8 and 15. IVF and Allopurinol for TLS. The patient has had refractory thrombocytopenia. H L A platelets were infused when they were available. when H L A platelets were not available 1/2 unit of platelet was infused over 3 hours. \par Follow up CT Head on 10/11 was stable and the patient continued to receive 1/2 units of platelets over 3 hours every 12 hours. The patient received last dose of Inotuzamab Cycle 1 on 10/22. On 10/15 patient was febrile,  a CT of the chest/abd/pelvis was done which showed scattered patchy ground glass opacities in right upper and lower lobes, suggestive of infection, patient received a course of IV antibiotics for Pneumonia. On 11/2 Blood cultures were found to be (+) for Co-agulase (-) Staph and patient received a course of Vancomycin. \par Repeat CT Head was completed on 10/23 for follow up and showed some new bleeding into previous collection. Findings were discussed with Neuro Surgery. \par HLA platelets were administered when available for refractory thrombocytopenia. \par Cycle 2 Inotuzumab was started on 11/6 which was given on Days 1, 8, 15. \par Patient tolerated second cycle without any adverse reactions. Patient for possible future Haploidentical Allogeneic stem cell transplant after discharge. \par Pre-BMT testing completed prior to discharge: Echo, MUGA, Xray sinuses, 24-Hr Urine for Creatinine. \par Patient had intermittent severe headaches on 11/3 and 11/15, repeat CTs showed nearly resolved hemorrhage. Headaches were managed with analgesics Fentanyl patch 37 mcg/hr and Oxycodone IR 15 mg q3 prn prior to discharge home. She is on ponatinib QOD b/c of increased LFT's.   [de-identified] : Presenting for a follow up AlloPSCT consult. Bone marrow biopsy form 1/23/19 reviewed with patient- remission confirmed. Patient does not note any changes in hyperpigmentation. Continues to require a cane to ambulate. In compliance with her medication. Patient appears to be in good spirits about proceeding with process. Her son Ponce is a the best donor match. Denies nausea, vomiting, diarrhea, chest pain, SOB or b/l LE edema. Will continue to follow up with pain-management specialists as needed.

## 2019-02-01 NOTE — ADDENDUM
[FreeTextEntry1] : Documented by Karely Gayle acting as a scribe for Dr. Luke Bentley on 1/31/2019.\par \par All medical record entries made by the Scribe were at my, Dr. Luke Bentley's, direction and personally dictated by me on 1/31/2019. I have reviewed the chart and agree that  the record accurately reflects my personal performance of the history, physical exam, assessment and plan. I have also personally directed, reviewed, and agree with the discharge instructions.\par \par

## 2019-02-01 NOTE — ASSESSMENT
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  Recent BM BX was c/w remission. Fish and pcr were negative. Step 1 complicated by hospitalization for neutropenic fevers and c-diff diarrhea 11/1-11/11. \par Was able to successfully collect her stem cell while inpt on 11/8 and 11/9. \par Now s/p [facility] on 12/16/16. Hospital course complicated by pancytopenia and fungal PNA. 12/22 CT chest revealed new 1cm right lung nodule and was started on voriconazole. Also +RVP for rhinovirus/enterovirus. Demonstrated engraftment on 12/26 and d/c'ed in stable condition on 1/3/17.\par \par Now relapsed ph pos ALL......s/p re induction with inotuzimab...on Ponatinib to deepen response.....f/u fish and pcr\par Peripheral blood work stable and discussed with patient. PCR remains positive on pb...hx of subdural...no HA. \par Continued  Ponatinib 45 mg QOD  ...achieved CR.... discussed rationale, risk, benefits and side effects of therapy. Patient verbalized understanding and expressed agreement with treatment plan. \par \par I have reviewed with the patient the rationale, risks and benefits of treatment with chemotherapy alone vs.chemotherapy plus haplo-transplant. \par \par I have also reviewed the pre-transplant testing for vital organ testing including, but not limited to ECHO, MUGA, PFTs, Urine analysis, BM Bx, LFTs and Dental evaluation. All pretesting completed. \par \par I discussed with the patient, the role of a donor. I have reviewed with the patient the donor screening process including a physical exam, HLA typing and antibody testing, and the stem cell collection process. I discussed the role of Neupogen for 5 days prior to obtaining the specimen. Side effects including but not limited to fever, body aches from Neupogen injections was discussed. \par \par I have also discussed the process of apheresis as a way to collect the peripheral stem cells. Anticoagulation with Citrate during apheresis was discussed, along with side effects including but not limited to hypocalcemia mild dysesthesias (most common) to tetany, seizures and cardiac arrhythmias. Treatment with oral or intravenous calcium supplementation in the setting of hypocalcemia was also discussed. \par \par I have discussed with the patient the pre-administration of Kepivance IV x 3 days, as a GI prophylaxis with the aim to prevent swelling, irritation, sores, and ulcers in the GI tract caused by high dose chemotherapy. Side effects including but not limited to flushing, itching from Kepivance were also discussed. \par \par I have discussed with the patient the four week hospital stay for the transplant. I have reviewed with the patient the initial chemotherapy with Fludarabine and Cytoxan as well as a single dose of radiation (TBI at 200cG) in the pre-transplant state. The patient will receive the stem cells by a triple lumen catheter following the radiation. I have discussed the role of high dose Cytoxan following the transplant on day 3 and day 4. I have discussed the role of CellCept x 30 days and Tacrolimus x 180 days in the post-transplant state as immunosuppressants. I have discussed the role of supportive care in the weeks following the transplant with the possibility of needing PRBCs, platelets, fluids, and/or electrolytes. \par \par I discussed with the patient, post-transplant precautions including the use of antiviral, antifungal, antibiotics, and immunosuppressant. I discussed with the patient post-transplant complications including but not limited to bleeding, infection, end-organ damage, mucositis, veno-occlusive disease, graft failure, graft rejection, progression of disease as well as GVHD (both acute and chronic). Possible symptoms of GVHD including, but not limited to, rash, diarrhea, nausea, vomiting, mouth sores, liver inflammation were discussed. I informed the patient of post-discharge expectations as well, including fatigue and “flu-like” symptoms. Post-discharge precautions including diet and crowd control discussed as well. I reviewed with the patient the risks and benefits of this procedure. I reviewed the timing of the transplant with the patient. \par \par I informed the patient that there is a 15% risk of a catastrophic event to occur, including but not limited to relapse, graft rejection, severe infection and death is during the first year post-transplant. Also, discussed with the patient that the risk of relapse decreases with each additional year post-transplant, and that at the 5 year rhina cure may be achieved. \par \par Literature and consents for Haplo-transplant were provided to the patient for review. Patient to consider option. All questions were addressed and emotional support provided.\par \par Discussed with patient that she does have HLA antibodies against her son....slightly above our cutoff...however given the gravity of her disease and no other donor options we will proceed\par \par 24 month vaccines due in February 2019 from auto - on hold for now. \par  Donor clearance for son Ponce in progress. \par Patient has appointment with radiation tomorrow.\par Plan to admit on 2/07.\par Son to start GF injections 2/08. Collect cells 2/12.\par Plan to transplant on 2/13.\par follow lfts...will hold ponatinib for now and start actigal....pt may be at increased risk for VOD

## 2019-02-01 NOTE — REVIEW OF SYSTEMS
[Negative] : Allergic/Immunologic [Fatigue] : no fatigue [Cough] : no cough [Muscle Weakness] : no muscle weakness [FreeTextEntry9] :  occ back pain [de-identified] : muscle spasms

## 2019-02-04 ENCOUNTER — LABORATORY RESULT (OUTPATIENT)
Age: 59
End: 2019-02-04

## 2019-02-04 ENCOUNTER — APPOINTMENT (OUTPATIENT)
Dept: INFUSION THERAPY | Facility: HOSPITAL | Age: 59
End: 2019-02-04

## 2019-02-04 ENCOUNTER — RESULT REVIEW (OUTPATIENT)
Age: 59
End: 2019-02-04

## 2019-02-04 LAB
BASOPHILS # BLD AUTO: 0.1 K/UL — SIGNIFICANT CHANGE UP (ref 0–0.2)
BASOPHILS NFR BLD AUTO: 1.2 % — SIGNIFICANT CHANGE UP (ref 0–2)
EOSINOPHIL # BLD AUTO: 0.2 K/UL — SIGNIFICANT CHANGE UP (ref 0–0.5)
EOSINOPHIL NFR BLD AUTO: 2.7 % — SIGNIFICANT CHANGE UP (ref 0–6)
HCT VFR BLD CALC: 42 % — SIGNIFICANT CHANGE UP (ref 34.5–45)
HGB BLD-MCNC: 14.3 G/DL — SIGNIFICANT CHANGE UP (ref 11.5–15.5)
LYMPHOCYTES # BLD AUTO: 3.3 K/UL — SIGNIFICANT CHANGE UP (ref 1–3.3)
LYMPHOCYTES # BLD AUTO: 38.5 % — SIGNIFICANT CHANGE UP (ref 13–44)
MCHC RBC-ENTMCNC: 34 G/DL — SIGNIFICANT CHANGE UP (ref 32–36)
MCHC RBC-ENTMCNC: 35.6 PG — HIGH (ref 27–34)
MCV RBC AUTO: 105 FL — HIGH (ref 80–100)
MONOCYTES # BLD AUTO: 1 K/UL — HIGH (ref 0–0.9)
MONOCYTES NFR BLD AUTO: 11.7 % — SIGNIFICANT CHANGE UP (ref 2–14)
NEUTROPHILS # BLD AUTO: 4 K/UL — SIGNIFICANT CHANGE UP (ref 1.8–7.4)
NEUTROPHILS NFR BLD AUTO: 45.9 % — SIGNIFICANT CHANGE UP (ref 43–77)
PLATELET # BLD AUTO: 79 K/UL — LOW (ref 150–400)
RBC # BLD: 4.01 M/UL — SIGNIFICANT CHANGE UP (ref 3.8–5.2)
RBC # FLD: 12.9 % — SIGNIFICANT CHANGE UP (ref 10.3–14.5)
WBC # BLD: 8.6 K/UL — SIGNIFICANT CHANGE UP (ref 3.8–10.5)
WBC # FLD AUTO: 8.6 K/UL — SIGNIFICANT CHANGE UP (ref 3.8–10.5)

## 2019-02-05 ENCOUNTER — RESULT REVIEW (OUTPATIENT)
Age: 59
End: 2019-02-05

## 2019-02-05 ENCOUNTER — LABORATORY RESULT (OUTPATIENT)
Age: 59
End: 2019-02-05

## 2019-02-05 ENCOUNTER — APPOINTMENT (OUTPATIENT)
Dept: INFUSION THERAPY | Facility: HOSPITAL | Age: 59
End: 2019-02-05

## 2019-02-05 DIAGNOSIS — Z51.11 ENCOUNTER FOR ANTINEOPLASTIC CHEMOTHERAPY: ICD-10-CM

## 2019-02-05 LAB
BASOPHILS # BLD AUTO: 0.1 K/UL — SIGNIFICANT CHANGE UP (ref 0–0.2)
BASOPHILS NFR BLD AUTO: 0.9 % — SIGNIFICANT CHANGE UP (ref 0–2)
BUN SERPL-MCNC: 11 MG/DL — SIGNIFICANT CHANGE UP (ref 7–23)
CA-I BLDA-SCNC: 1.34 MMOL/L — HIGH (ref 1.12–1.3)
CHLORIDE SERPL-SCNC: 107 MMOL/L — SIGNIFICANT CHANGE UP (ref 96–108)
CHROM ANALY OVERALL INTERP SPEC-IMP: SIGNIFICANT CHANGE UP
CO2 SERPL-SCNC: 25 MMOL/L — SIGNIFICANT CHANGE UP (ref 22–31)
CREAT SERPL-MCNC: 0.8 MG/DL — SIGNIFICANT CHANGE UP (ref 0.5–1.3)
EOSINOPHIL # BLD AUTO: 0.2 K/UL — SIGNIFICANT CHANGE UP (ref 0–0.5)
EOSINOPHIL NFR BLD AUTO: 2.5 % — SIGNIFICANT CHANGE UP (ref 0–6)
GLUCOSE SERPL-MCNC: 92 MG/DL — SIGNIFICANT CHANGE UP (ref 70–99)
HCT VFR BLD CALC: 42.3 % — SIGNIFICANT CHANGE UP (ref 34.5–45)
HGB BLD-MCNC: 14.1 G/DL — SIGNIFICANT CHANGE UP (ref 11.5–15.5)
LYMPHOCYTES # BLD AUTO: 3 K/UL — SIGNIFICANT CHANGE UP (ref 1–3.3)
LYMPHOCYTES # BLD AUTO: 34.5 % — SIGNIFICANT CHANGE UP (ref 13–44)
MCHC RBC-ENTMCNC: 33.4 G/DL — SIGNIFICANT CHANGE UP (ref 32–36)
MCHC RBC-ENTMCNC: 34.6 PG — HIGH (ref 27–34)
MCV RBC AUTO: 104 FL — HIGH (ref 80–100)
MONOCYTES # BLD AUTO: 1.1 K/UL — HIGH (ref 0–0.9)
MONOCYTES NFR BLD AUTO: 13 % — SIGNIFICANT CHANGE UP (ref 2–14)
NEUTROPHILS # BLD AUTO: 4.2 K/UL — SIGNIFICANT CHANGE UP (ref 1.8–7.4)
NEUTROPHILS NFR BLD AUTO: 49.2 % — SIGNIFICANT CHANGE UP (ref 43–77)
PLATELET # BLD AUTO: 95 K/UL — LOW (ref 150–400)
POTASSIUM SERPL-MCNC: 3.9 MMOL/L — SIGNIFICANT CHANGE UP (ref 3.5–5.3)
POTASSIUM SERPL-SCNC: 3.9 MMOL/L — SIGNIFICANT CHANGE UP (ref 3.5–5.3)
RBC # BLD: 4.07 M/UL — SIGNIFICANT CHANGE UP (ref 3.8–5.2)
RBC # FLD: 13 % — SIGNIFICANT CHANGE UP (ref 10.3–14.5)
SODIUM SERPL-SCNC: 143 MMOL/L — SIGNIFICANT CHANGE UP (ref 135–145)
WBC # BLD: 8.6 K/UL — SIGNIFICANT CHANGE UP (ref 3.8–10.5)
WBC # FLD AUTO: 8.6 K/UL — SIGNIFICANT CHANGE UP (ref 3.8–10.5)

## 2019-02-06 ENCOUNTER — APPOINTMENT (OUTPATIENT)
Dept: INFUSION THERAPY | Facility: HOSPITAL | Age: 59
End: 2019-02-06

## 2019-02-06 DIAGNOSIS — Z01.818 ENCOUNTER FOR OTHER PREPROCEDURAL EXAMINATION: ICD-10-CM

## 2019-02-06 DIAGNOSIS — Z94.84 STEM CELLS TRANSPLANT STATUS: ICD-10-CM

## 2019-02-06 PROCEDURE — 77300 RADIATION THERAPY DOSE PLAN: CPT | Mod: 26

## 2019-02-06 PROCEDURE — 77334 RADIATION TREATMENT AID(S): CPT | Mod: 26

## 2019-02-07 ENCOUNTER — INPATIENT (INPATIENT)
Facility: HOSPITAL | Age: 59
LOS: 24 days | Discharge: HOME | DRG: 14 | End: 2019-03-04
Attending: INTERNAL MEDICINE | Admitting: INTERNAL MEDICINE
Payer: MEDICARE

## 2019-02-07 VITALS
TEMPERATURE: 99 F | RESPIRATION RATE: 18 BRPM | WEIGHT: 245.59 LBS | DIASTOLIC BLOOD PRESSURE: 79 MMHG | SYSTOLIC BLOOD PRESSURE: 112 MMHG | HEIGHT: 62.2 IN | HEART RATE: 91 BPM

## 2019-02-07 DIAGNOSIS — C91.00 ACUTE LYMPHOBLASTIC LEUKEMIA NOT HAVING ACHIEVED REMISSION: ICD-10-CM

## 2019-02-07 DIAGNOSIS — Z94.84 STEM CELLS TRANSPLANT STATUS: ICD-10-CM

## 2019-02-07 DIAGNOSIS — Z41.9 ENCOUNTER FOR PROCEDURE FOR PURPOSES OTHER THAN REMEDYING HEALTH STATE, UNSPECIFIED: Chronic | ICD-10-CM

## 2019-02-07 DIAGNOSIS — Z98.89 OTHER SPECIFIED POSTPROCEDURAL STATES: Chronic | ICD-10-CM

## 2019-02-07 DIAGNOSIS — D17.9 BENIGN LIPOMATOUS NEOPLASM, UNSPECIFIED: Chronic | ICD-10-CM

## 2019-02-07 DIAGNOSIS — Z29.9 ENCOUNTER FOR PROPHYLACTIC MEASURES, UNSPECIFIED: ICD-10-CM

## 2019-02-07 DIAGNOSIS — S06.5X9A TRAUMATIC SUBDURAL HEMORRHAGE WITH LOSS OF CONSCIOUSNESS OF UNSPECIFIED DURATION, INITIAL ENCOUNTER: ICD-10-CM

## 2019-02-07 LAB
ALBUMIN SERPL ELPH-MCNC: 4.2 G/DL — SIGNIFICANT CHANGE UP (ref 3.3–5)
ALP SERPL-CCNC: 285 U/L — HIGH (ref 40–120)
ALT FLD-CCNC: 55 U/L — HIGH (ref 10–45)
ANION GAP SERPL CALC-SCNC: 15 MMOL/L — SIGNIFICANT CHANGE UP (ref 5–17)
ANTIBODY ID 1_1: SIGNIFICANT CHANGE UP
ANTIBODY ID 1_2: SIGNIFICANT CHANGE UP
APPEARANCE UR: ABNORMAL
APTT BLD: 34.8 SEC — SIGNIFICANT CHANGE UP (ref 27.5–36.3)
AST SERPL-CCNC: 33 U/L — SIGNIFICANT CHANGE UP (ref 10–40)
BASOPHILS # BLD AUTO: 0 K/UL — SIGNIFICANT CHANGE UP (ref 0–0.2)
BASOPHILS NFR BLD AUTO: 0.5 % — SIGNIFICANT CHANGE UP (ref 0–2)
BILIRUB DIRECT SERPL-MCNC: <0.1 MG/DL — SIGNIFICANT CHANGE UP (ref 0–0.2)
BILIRUB INDIRECT FLD-MCNC: >0.2 MG/DL — SIGNIFICANT CHANGE UP (ref 0.2–1)
BILIRUB SERPL-MCNC: 0.3 MG/DL — SIGNIFICANT CHANGE UP (ref 0.2–1.2)
BILIRUB UR-MCNC: NEGATIVE — SIGNIFICANT CHANGE UP
BLD GP AB SCN SERPL QL: POSITIVE — SIGNIFICANT CHANGE UP
BUN SERPL-MCNC: 9 MG/DL — SIGNIFICANT CHANGE UP (ref 7–23)
CALCIUM SERPL-MCNC: 9.9 MG/DL — SIGNIFICANT CHANGE UP (ref 8.4–10.5)
CHLORIDE SERPL-SCNC: 105 MMOL/L — SIGNIFICANT CHANGE UP (ref 96–108)
CO2 SERPL-SCNC: 22 MMOL/L — SIGNIFICANT CHANGE UP (ref 22–31)
COLOR SPEC: YELLOW — SIGNIFICANT CHANGE UP
CREAT SERPL-MCNC: 0.7 MG/DL — SIGNIFICANT CHANGE UP (ref 0.5–1.3)
DAT POLY-SP REAG RBC QL: NEGATIVE — SIGNIFICANT CHANGE UP
DIFF PNL FLD: NEGATIVE — SIGNIFICANT CHANGE UP
EOSINOPHIL # BLD AUTO: 0.2 K/UL — SIGNIFICANT CHANGE UP (ref 0–0.5)
EOSINOPHIL NFR BLD AUTO: 2.5 % — SIGNIFICANT CHANGE UP (ref 0–6)
FIBRINOGEN PPP-MCNC: 824 MG/DL — HIGH (ref 350–510)
GLUCOSE SERPL-MCNC: 104 MG/DL — HIGH (ref 70–99)
GLUCOSE UR QL: NEGATIVE MG/DL — SIGNIFICANT CHANGE UP
HCT VFR BLD CALC: 40.4 % — SIGNIFICANT CHANGE UP (ref 34.5–45)
HGB BLD-MCNC: 13.5 G/DL — SIGNIFICANT CHANGE UP (ref 11.5–15.5)
INR BLD: 0.91 RATIO — SIGNIFICANT CHANGE UP (ref 0.88–1.16)
KETONES UR-MCNC: NEGATIVE — SIGNIFICANT CHANGE UP
LDH SERPL L TO P-CCNC: 179 U/L — SIGNIFICANT CHANGE UP (ref 50–242)
LEUKOCYTE ESTERASE UR-ACNC: ABNORMAL
LYMPHOCYTES # BLD AUTO: 3.2 K/UL — SIGNIFICANT CHANGE UP (ref 1–3.3)
LYMPHOCYTES # BLD AUTO: 36.9 % — SIGNIFICANT CHANGE UP (ref 13–44)
MAGNESIUM SERPL-MCNC: 2.1 MG/DL — SIGNIFICANT CHANGE UP (ref 1.6–2.6)
MCHC RBC-ENTMCNC: 33.5 GM/DL — SIGNIFICANT CHANGE UP (ref 32–36)
MCHC RBC-ENTMCNC: 34.9 PG — HIGH (ref 27–34)
MCV RBC AUTO: 104 FL — HIGH (ref 80–100)
MONOCYTES # BLD AUTO: 1.1 K/UL — HIGH (ref 0–0.9)
MONOCYTES NFR BLD AUTO: 12.8 % — SIGNIFICANT CHANGE UP (ref 2–14)
NEUTROPHILS # BLD AUTO: 4.1 K/UL — SIGNIFICANT CHANGE UP (ref 1.8–7.4)
NEUTROPHILS NFR BLD AUTO: 47.3 % — SIGNIFICANT CHANGE UP (ref 43–77)
NITRITE UR-MCNC: POSITIVE
PH UR: 5 — SIGNIFICANT CHANGE UP (ref 5–8)
PHOSPHATE SERPL-MCNC: 3.1 MG/DL — SIGNIFICANT CHANGE UP (ref 2.5–4.5)
PLATELET # BLD AUTO: 95 K/UL — LOW (ref 150–400)
POTASSIUM SERPL-MCNC: 4.1 MMOL/L — SIGNIFICANT CHANGE UP (ref 3.5–5.3)
POTASSIUM SERPL-SCNC: 4.1 MMOL/L — SIGNIFICANT CHANGE UP (ref 3.5–5.3)
PROT SERPL-MCNC: 6.8 G/DL — SIGNIFICANT CHANGE UP (ref 6–8.3)
PROT UR-MCNC: NEGATIVE MG/DL — SIGNIFICANT CHANGE UP
PROTHROM AB SERPL-ACNC: 10.3 SEC — SIGNIFICANT CHANGE UP (ref 10–12.9)
RAPID RVP RESULT: SIGNIFICANT CHANGE UP
RBC # BLD: 3.87 M/UL — SIGNIFICANT CHANGE UP (ref 3.8–5.2)
RBC # BLD: 3.87 M/UL — SIGNIFICANT CHANGE UP (ref 3.8–5.2)
RBC # FLD: 13.2 % — SIGNIFICANT CHANGE UP (ref 10.3–14.5)
RETICS #: 94.1 K/UL — SIGNIFICANT CHANGE UP (ref 25–125)
RETICS/RBC NFR: 2.4 % — SIGNIFICANT CHANGE UP (ref 0.5–2.5)
RH IG SCN BLD-IMP: POSITIVE — SIGNIFICANT CHANGE UP
SODIUM SERPL-SCNC: 142 MMOL/L — SIGNIFICANT CHANGE UP (ref 135–145)
SP GR SPEC: 1.02 — SIGNIFICANT CHANGE UP (ref 1.01–1.02)
SP GR UR STRIP: 1 — LOW (ref 1.01–1.02)
SP GR UR STRIP: 1.01 — SIGNIFICANT CHANGE UP (ref 1.01–1.02)
SP GR UR STRIP: 1.02 — SIGNIFICANT CHANGE UP (ref 1.01–1.02)
SP GR UR STRIP: 1.02 — SIGNIFICANT CHANGE UP (ref 1.01–1.02)
UROBILINOGEN FLD QL: NEGATIVE MG/DL — SIGNIFICANT CHANGE UP
WBC # BLD: 8.6 K/UL — SIGNIFICANT CHANGE UP (ref 3.8–10.5)
WBC # FLD AUTO: 8.6 K/UL — SIGNIFICANT CHANGE UP (ref 3.8–10.5)

## 2019-02-07 PROCEDURE — 93010 ELECTROCARDIOGRAM REPORT: CPT

## 2019-02-07 PROCEDURE — 86077 PHYS BLOOD BANK SERV XMATCH: CPT

## 2019-02-07 PROCEDURE — 77001 FLUOROGUIDE FOR VEIN DEVICE: CPT | Mod: 26

## 2019-02-07 PROCEDURE — 76937 US GUIDE VASCULAR ACCESS: CPT | Mod: 26

## 2019-02-07 PROCEDURE — 71046 X-RAY EXAM CHEST 2 VIEWS: CPT | Mod: 26

## 2019-02-07 PROCEDURE — 36556 INSERT NON-TUNNEL CV CATH: CPT | Mod: RT

## 2019-02-07 PROCEDURE — 99223 1ST HOSP IP/OBS HIGH 75: CPT

## 2019-02-07 RX ORDER — SODIUM CHLORIDE 9 MG/ML
1000 INJECTION, SOLUTION INTRAVENOUS
Qty: 0 | Refills: 0 | Status: DISCONTINUED | OUTPATIENT
Start: 2019-02-07 | End: 2019-02-15

## 2019-02-07 RX ORDER — CLOTRIMAZOLE 10 MG
1 TROCHE MUCOUS MEMBRANE
Qty: 0 | Refills: 0 | Status: DISCONTINUED | OUTPATIENT
Start: 2019-02-07 | End: 2019-03-04

## 2019-02-07 RX ORDER — MESNA 100 MG/ML
1337 INJECTION, SOLUTION INTRAVENOUS EVERY 24 HOURS
Qty: 0 | Refills: 0 | Status: COMPLETED | OUTPATIENT
Start: 2019-02-07 | End: 2019-02-08

## 2019-02-07 RX ORDER — LOSARTAN POTASSIUM 100 MG/1
100 TABLET, FILM COATED ORAL DAILY
Qty: 0 | Refills: 0 | Status: DISCONTINUED | OUTPATIENT
Start: 2019-02-07 | End: 2019-03-04

## 2019-02-07 RX ORDER — METOCLOPRAMIDE HCL 10 MG
10 TABLET ORAL EVERY 6 HOURS
Qty: 0 | Refills: 0 | Status: DISCONTINUED | OUTPATIENT
Start: 2019-02-07 | End: 2019-02-11

## 2019-02-07 RX ORDER — ACETAMINOPHEN 500 MG
650 TABLET ORAL EVERY 6 HOURS
Qty: 0 | Refills: 0 | Status: DISCONTINUED | OUTPATIENT
Start: 2019-02-07 | End: 2019-03-04

## 2019-02-07 RX ORDER — PANTOPRAZOLE SODIUM 20 MG/1
40 TABLET, DELAYED RELEASE ORAL
Qty: 0 | Refills: 0 | Status: DISCONTINUED | OUTPATIENT
Start: 2019-02-07 | End: 2019-03-04

## 2019-02-07 RX ORDER — FLUCONAZOLE 150 MG/1
400 TABLET ORAL DAILY
Qty: 0 | Refills: 0 | Status: DISCONTINUED | OUTPATIENT
Start: 2019-02-07 | End: 2019-03-04

## 2019-02-07 RX ORDER — URSODIOL 250 MG/1
300 TABLET, FILM COATED ORAL
Qty: 0 | Refills: 0 | Status: DISCONTINUED | OUTPATIENT
Start: 2019-02-07 | End: 2019-03-04

## 2019-02-07 RX ORDER — FILGRASTIM 480MCG/1.6
480 VIAL (ML) INJECTION DAILY
Qty: 0 | Refills: 0 | Status: DISCONTINUED | OUTPATIENT
Start: 2019-02-18 | End: 2019-02-28

## 2019-02-07 RX ORDER — FUROSEMIDE 40 MG
20 TABLET ORAL EVERY 24 HOURS
Qty: 0 | Refills: 0 | Status: COMPLETED | OUTPATIENT
Start: 2019-02-16 | End: 2019-02-17

## 2019-02-07 RX ORDER — SALIVA SUBSTITUTE COMB NO.11 351 MG
5 POWDER IN PACKET (EA) MUCOUS MEMBRANE
Qty: 0 | Refills: 0 | Status: DISCONTINUED | OUTPATIENT
Start: 2019-02-07 | End: 2019-03-04

## 2019-02-07 RX ORDER — DOCUSATE SODIUM 100 MG
100 CAPSULE ORAL THREE TIMES A DAY
Qty: 0 | Refills: 0 | Status: DISCONTINUED | OUTPATIENT
Start: 2019-02-07 | End: 2019-03-04

## 2019-02-07 RX ORDER — HEPARIN SODIUM 5000 [USP'U]/ML
464 INJECTION INTRAVENOUS; SUBCUTANEOUS
Qty: 25000 | Refills: 0 | Status: DISCONTINUED | OUTPATIENT
Start: 2019-02-07 | End: 2019-03-04

## 2019-02-07 RX ORDER — SODIUM BICARBONATE 1 MEQ/ML
10 SYRINGE (ML) INTRAVENOUS
Qty: 0 | Refills: 0 | Status: DISCONTINUED | OUTPATIENT
Start: 2019-02-07 | End: 2019-03-04

## 2019-02-07 RX ORDER — ZOLPIDEM TARTRATE 10 MG/1
5 TABLET ORAL ONCE
Qty: 0 | Refills: 0 | Status: DISCONTINUED | OUTPATIENT
Start: 2019-02-07 | End: 2019-02-07

## 2019-02-07 RX ORDER — APREPITANT 80 MG/1
80 CAPSULE ORAL EVERY 24 HOURS
Qty: 0 | Refills: 0 | Status: COMPLETED | OUTPATIENT
Start: 2019-02-08 | End: 2019-02-18

## 2019-02-07 RX ORDER — FOLIC ACID 0.8 MG
1 TABLET ORAL DAILY
Qty: 0 | Refills: 0 | Status: DISCONTINUED | OUTPATIENT
Start: 2019-02-07 | End: 2019-03-04

## 2019-02-07 RX ORDER — ACYCLOVIR SODIUM 500 MG
400 VIAL (EA) INTRAVENOUS EVERY 8 HOURS
Qty: 0 | Refills: 0 | Status: DISCONTINUED | OUTPATIENT
Start: 2019-02-12 | End: 2019-03-04

## 2019-02-07 RX ORDER — FUROSEMIDE 40 MG
20 TABLET ORAL ONCE
Qty: 0 | Refills: 0 | Status: COMPLETED | OUTPATIENT
Start: 2019-02-07 | End: 2019-02-07

## 2019-02-07 RX ORDER — SODIUM CHLORIDE 9 MG/ML
1000 INJECTION, SOLUTION INTRAVENOUS
Qty: 0 | Refills: 0 | Status: DISCONTINUED | OUTPATIENT
Start: 2019-02-15 | End: 2019-02-19

## 2019-02-07 RX ORDER — DIPHENHYDRAMINE HCL 50 MG
25 CAPSULE ORAL EVERY 4 HOURS
Qty: 0 | Refills: 0 | Status: DISCONTINUED | OUTPATIENT
Start: 2019-02-07 | End: 2019-03-04

## 2019-02-07 RX ORDER — TACROLIMUS 5 MG/1
1 CAPSULE ORAL
Qty: 0 | Refills: 0 | Status: DISCONTINUED | OUTPATIENT
Start: 2019-02-18 | End: 2019-02-21

## 2019-02-07 RX ORDER — LIDOCAINE AND PRILOCAINE CREAM 25; 25 MG/G; MG/G
1 CREAM TOPICAL DAILY
Qty: 0 | Refills: 0 | Status: DISCONTINUED | OUTPATIENT
Start: 2019-02-18 | End: 2019-03-04

## 2019-02-07 RX ORDER — ONDANSETRON 8 MG/1
8 TABLET, FILM COATED ORAL EVERY 8 HOURS
Qty: 0 | Refills: 0 | Status: COMPLETED | OUTPATIENT
Start: 2019-02-07 | End: 2019-02-19

## 2019-02-07 RX ORDER — FLUDARABINE PHOSPHATE 25 MG/ML
51 INJECTION, SOLUTION INTRAVENOUS EVERY 24 HOURS
Qty: 0 | Refills: 0 | Status: ACTIVE | OUTPATIENT
Start: 2019-02-07 | End: 2019-02-12

## 2019-02-07 RX ORDER — SODIUM CHLORIDE 9 MG/ML
1000 INJECTION INTRAMUSCULAR; INTRAVENOUS; SUBCUTANEOUS
Qty: 0 | Refills: 0 | Status: DISCONTINUED | OUTPATIENT
Start: 2019-02-07 | End: 2019-03-04

## 2019-02-07 RX ORDER — CYCLOPHOSPHAMIDE 100 MG
990 VIAL (EA) INTRAVENOUS EVERY 24 HOURS
Qty: 0 | Refills: 0 | Status: COMPLETED | OUTPATIENT
Start: 2019-02-07 | End: 2019-02-08

## 2019-02-07 RX ORDER — SENNA PLUS 8.6 MG/1
1 TABLET ORAL AT BEDTIME
Qty: 0 | Refills: 0 | Status: DISCONTINUED | OUTPATIENT
Start: 2019-02-07 | End: 2019-03-04

## 2019-02-07 RX ORDER — MYCOPHENOLATE MOFETIL 250 MG/1
1000 CAPSULE ORAL
Qty: 0 | Refills: 0 | Status: DISCONTINUED | OUTPATIENT
Start: 2019-02-18 | End: 2019-03-04

## 2019-02-07 RX ORDER — APREPITANT 80 MG/1
125 CAPSULE ORAL ONCE
Qty: 0 | Refills: 0 | Status: COMPLETED | OUTPATIENT
Start: 2019-02-07 | End: 2019-02-07

## 2019-02-07 RX ORDER — LEVETIRACETAM 250 MG/1
500 TABLET, FILM COATED ORAL
Qty: 0 | Refills: 0 | Status: DISCONTINUED | OUTPATIENT
Start: 2019-02-07 | End: 2019-02-07

## 2019-02-07 RX ADMIN — ZOLPIDEM TARTRATE 5 MILLIGRAM(S): 10 TABLET ORAL at 23:16

## 2019-02-07 RX ADMIN — PANTOPRAZOLE SODIUM 40 MILLIGRAM(S): 20 TABLET, DELAYED RELEASE ORAL at 14:10

## 2019-02-07 RX ADMIN — ONDANSETRON 8 MILLIGRAM(S): 8 TABLET, FILM COATED ORAL at 17:25

## 2019-02-07 RX ADMIN — Medication 20 MILLIGRAM(S): at 21:36

## 2019-02-07 RX ADMIN — Medication 5 MILLILITER(S): at 21:37

## 2019-02-07 RX ADMIN — FLUDARABINE PHOSPHATE 204.08 MILLIGRAM(S): 25 INJECTION, SOLUTION INTRAVENOUS at 17:58

## 2019-02-07 RX ADMIN — FLUCONAZOLE 400 MILLIGRAM(S): 150 TABLET ORAL at 14:10

## 2019-02-07 RX ADMIN — Medication 5 MILLILITER(S): at 17:27

## 2019-02-07 RX ADMIN — Medication 1 TABLET(S): at 14:06

## 2019-02-07 RX ADMIN — URSODIOL 300 MILLIGRAM(S): 250 TABLET, FILM COATED ORAL at 17:26

## 2019-02-07 RX ADMIN — APREPITANT 125 MILLIGRAM(S): 80 CAPSULE ORAL at 17:24

## 2019-02-07 RX ADMIN — SODIUM CHLORIDE 20 MILLILITER(S): 9 INJECTION INTRAMUSCULAR; INTRAVENOUS; SUBCUTANEOUS at 18:09

## 2019-02-07 RX ADMIN — LOSARTAN POTASSIUM 100 MILLIGRAM(S): 100 TABLET, FILM COATED ORAL at 14:06

## 2019-02-07 RX ADMIN — HEPARIN SODIUM 4.64 UNIT(S)/HR: 5000 INJECTION INTRAVENOUS; SUBCUTANEOUS at 18:06

## 2019-02-07 RX ADMIN — Medication 650 MILLIGRAM(S): at 21:41

## 2019-02-07 RX ADMIN — Medication 299 MILLIGRAM(S): at 23:13

## 2019-02-07 RX ADMIN — Medication 10 MILLILITER(S): at 17:28

## 2019-02-07 RX ADMIN — Medication 1 TABLET(S): at 17:27

## 2019-02-07 RX ADMIN — Medication 1 MILLIGRAM(S): at 14:10

## 2019-02-07 RX ADMIN — Medication 1 LOZENGE: at 21:37

## 2019-02-07 RX ADMIN — Medication 650 MILLIGRAM(S): at 22:30

## 2019-02-07 RX ADMIN — Medication 1 LOZENGE: at 17:27

## 2019-02-07 RX ADMIN — SODIUM CHLORIDE 200 MILLILITER(S): 9 INJECTION, SOLUTION INTRAVENOUS at 18:09

## 2019-02-07 RX ADMIN — Medication 10 MILLILITER(S): at 21:36

## 2019-02-07 RX ADMIN — MESNA 453.48 MILLIGRAM(S): 100 INJECTION, SOLUTION INTRAVENOUS at 22:48

## 2019-02-07 NOTE — H&P ADULT - PROBLEM SELECTOR PLAN 2
1. VOD prophylaxis - low dose heparin gtt (dosed at 100 units / kg / day), glutamine supplementation, Actigall BID   2. PCP prophylaxis - Bactrim DS through day -2    3. Antiviral prophylaxis - Acyclovir 400mg po TID to start day -1   4. Antifungal prophylaxis- Diflucan 400 mg po daily.  5.. GI prophylaxis - Protonix po QD   6. Antibacterial prophylaxis - when ANC < 1000, start Cipro 500mg po BID. If becomes febrile, pan cx, CXR and change Cipro to Cefepime 2g IV q 8 hours. Continue until count recovery  7. Kepivance for prevention of mucositis- days 0, +1, +2  8. Aggressive mouth care and skin care as per protocol

## 2019-02-07 NOTE — PROGRESS NOTE ADULT - SUBJECTIVE AND OBJECTIVE BOX
Diagnosis: Relapsed ALL Ph (+)     Protocol/Chemo Regimen: TBD    Day: N/A    Pt endorsed: Headache improved with pain medications, nausea    Review of Systems: Patient denied, vomiting,  chest pain, cough, dyspnea, abdominal pain, constipation, diarrhea     Pain scale: denies now     Diet: Regular     Allergies: No Known Drug Allergies  shellfish (Nausea; Vomiting)      ANTIMICROBIALS  levoFLOXacin  Tablet 500 milliGRAM(s) Oral every 24 hours  posaconazole DR Tablet 300 milliGRAM(s) Oral daily      STANDING MEDICATIONS  influenza   Vaccine 0.5 milliLiter(s) IntraMuscular once  levETIRAcetam 500 milliGRAM(s) Oral two times a day  multivitamin 1 Tablet(s) Oral daily  pantoprazole  Injectable 40 milliGRAM(s) IV Push daily  sodium chloride 0.9%. 1000 milliLiter(s) IV Continuous <Continuous>      PRN MEDICATIONS  acetaminophen   Tablet .. 650 milliGRAM(s) Oral every 6 hours PRN  HYDROmorphone  Injectable 1 milliGRAM(s) IV Push every 6 hours PRN  metoclopramide Injectable 10 milliGRAM(s) IV Push every 6 hours PRN      Vital Signs Last 24 Hrs  T(C): 37.3 (08 Oct 2018 05:20), Max: 37.7 (07 Oct 2018 20:03)  T(F): 99.1 (08 Oct 2018 05:20), Max: 99.8 (07 Oct 2018 20:03)  HR: 72 (08 Oct 2018 05:20) (72 - 89)  BP: 130/82 (08 Oct 2018 05:20) (103/67 - 144/81)  BP(mean): --  RR: 18 (08 Oct 2018 05:20) (18 - 19)  SpO2: 95% (08 Oct 2018 05:20) (94% - 99%)      PHYSICAL EXAM  General: NAD  HEENT: PERRLA, EOMI, clear oropharynx, anicteric sclera, pink conjunctiva  Neck: supple  CV: (+) S1/S2 RRR  Lungs: clear to auscultation, no wheezes or rales  Abdomen: soft, non-tender, non-distended (+) BS  Ext: no clubbing, cyanosis or edema  Skin: no rashes and no petechiae  Neuro: alert and oriented X 3, no focal deficits  PIV: C/D/I         LABS:                     RADIOLOGY & ADDITIONAL STUDIES:    EXAM:  CT BRAIN                        PROCEDURE DATE:  10/06/2018    Impression: No significant change when allowing for differences in   technique.      EXAM:  CT ABDOMEN AND PELVIS                        PROCEDURE DATE:  10/05/2018    IMPRESSION: No evidence of retroperitoneal bleed.  Cholelithiasis. Diagnosis: Relapsed ALL Ph (+)     Protocol/Chemo Regimen: Inotuzamab    Day: 1    Pt endorsed: Headache improved with pain medications, nausea    Review of Systems: Patient denies dizziness, visual changes, chest pain, palpitations, SOB, abdominal pain, vomiting, diarrhea or dysuria.     Pain scale: denies now     Diet: Regular     Allergies: No Known Drug Allergies  shellfish (Nausea; Vomiting)      ANTIMICROBIALS  levoFLOXacin  Tablet 500 milliGRAM(s) Oral every 24 hours  posaconazole DR Tablet 300 milliGRAM(s) Oral daily      STANDING MEDICATIONS  influenza   Vaccine 0.5 milliLiter(s) IntraMuscular once  levETIRAcetam 500 milliGRAM(s) Oral two times a day  multivitamin 1 Tablet(s) Oral daily  pantoprazole  Injectable 40 milliGRAM(s) IV Push daily  sodium chloride 0.9%. 1000 milliLiter(s) IV Continuous <Continuous>      PRN MEDICATIONS  acetaminophen   Tablet .. 650 milliGRAM(s) Oral every 6 hours PRN  HYDROmorphone  Injectable 1 milliGRAM(s) IV Push every 6 hours PRN  metoclopramide Injectable 10 milliGRAM(s) IV Push every 6 hours PRN      Vital Signs Last 24 Hrs  T(C): 37.3 (08 Oct 2018 05:20), Max: 37.7 (07 Oct 2018 20:03)  T(F): 99.1 (08 Oct 2018 05:20), Max: 99.8 (07 Oct 2018 20:03)  HR: 72 (08 Oct 2018 05:20) (72 - 89)  BP: 130/82 (08 Oct 2018 05:20) (103/67 - 144/81)  BP(mean): --  RR: 18 (08 Oct 2018 05:20) (18 - 19)  SpO2: 95% (08 Oct 2018 05:20) (94% - 99%)      PHYSICAL EXAM  General: NAD  HEENT: PERRLA, EOMI, clear oropharynx, anicteric sclera, pink conjunctiva  Neck: supple  CV: (+) S1/S2 RRR  Lungs: clear to auscultation, no wheezes or rales  Abdomen: soft, non-tender, non-distended (+) BS  Ext: no clubbing, cyanosis or edema  Skin: no rashes and no petechiae  Neuro: alert and oriented X 3, no focal deficits  PIV: C/D/I         LABS:                     RADIOLOGY & ADDITIONAL STUDIES:    EXAM:  CT BRAIN                        PROCEDURE DATE:  10/06/2018    Impression: No significant change when allowing for differences in   technique.      EXAM:  CT ABDOMEN AND PELVIS                        PROCEDURE DATE:  10/05/2018    IMPRESSION: No evidence of retroperitoneal bleed.  Cholelithiasis. Discontinue Regimen: epiduo forte

## 2019-02-07 NOTE — H&P ADULT - NSHPREVIEWOFSYSTEMS_GEN_ALL_CORE
CONSTITUTIONAL: No  fevers or chills  EYES/ENT: No visual changes;  No vertigo or throat pain   NECK: No pain or stiffness  RESPIRATORY: + cough as per HPI  CARDIOVASCULAR: No chest pain or palpitations  GASTROINTESTINAL: No abdominal or epigastric pain. No nausea, vomiting, or hematemesis; No diarrhea or constipation. No melena or hematochezia.  GENITOURINARY: No dysuria, frequency or hematuria  NEUROLOGICAL: No numbness or weakness  SKIN: No itching, burning, rashes, or lesions   All other review of systems is negative unless indicated above.

## 2019-02-07 NOTE — H&P ADULT - NSHPPHYSICALEXAM_GEN_ALL_CORE
Height (cm): 158 (02-07 @ 09:34)  Weight (kg): 111.4 (02-07 @ 09:34)  BMI (kg/m2): 44.6 (02-07 @ 09:34)  BSA (m2): 2.09 (02-07 @ 09:34)    T(F): 98.8 (02-07-19 @ 09:34), Max: 98.8 (02-07-19 @ 09:34)  HR: 91 (02-07-19 @ 09:34)  BP: 112/79 (02-07-19 @ 09:34)  RR: 18 (02-07-19 @ 09:34)  SpO2: --  Wt(kg): --    GENERAL: Obese  HEAD:  Atraumatic, Normocephalic  EYES: EOMI, PERRLA, conjunctiva and sclera clear  NECK: Supple, No JVD  CHEST/LUNG: course breath sounds and rhonchi in the right middle and lower lung fields  HEART: Regular rate and rhythm; No murmurs, rubs, or gallops  ABDOMEN: Soft, Nontender, Nondistended; Bowel sounds present  EXTREMITIES:  2+ Peripheral Pulses, No clubbing, cyanosis, or edema  NEUROLOGY: non-focal  SKIN: No rashes or lesions

## 2019-02-07 NOTE — H&P ADULT - NSHPLABSRESULTS_GEN_ALL_CORE
14.1   8.6   )-----------( 95       ( 05 Feb 2019 16:53 )             42.3       02-05    143  |  107  |  11  ----------------------------<  92  3.9   |  25  |  0.80

## 2019-02-07 NOTE — H&P ADULT - PROBLEM SELECTOR PLAN 1
1. Admit to BMTU   2. TLC placement in IR   3. Day -6 - day + 5 - antiemetics   4. Day - 6 start CTX hydration - 1/2NS @ 200ml /hr    5. Strict I&O, daily weights, prn diuresis   6. Day -6 - day -2 - Fludarabine 30mg/m2   7. Day -6 - day - 5 - CTX 14.5mg/kg/day. CTX to start afer urine SG < 1.010. Mesna  12mg / kg - hemorrhagic cystitis ppx   8. Day -2 - IVIG 0.4 g/kg (ideal body weight) every 3 weeks. Premedication with Tylenol and benadryl  9. Day -1 -  cGy x 1 dose   10. Day - 1 - at 2200 begin transplant hydration : 0.45Ns + 1 amp (50mEq) sodium bicarbonate at 150ml /hr; continue transplant hydration for 24 hours post infusion   begin transplant hydration : 0.45Ns + 1 amp (50mEq) sodium bicarbonate at 150ml /hr; continue transplant hydration for 24 hours post infusion   12. Day + 2 at 2200 - begin CTX hydration (0.45NS + 10 mEq KCl/ @150ml /m2  continue 24 hours post last dose of CTX   13. Day + 3 - + 4 - CTX 50mg/kg.day. Begin CTX when urine SG < 1.010. EKG daily. Mesna for hemorraghic cystitis ppx.   14. Day + 5 - start Zarxio,  MMF and Tacrolimus. Check Tacrolimus levels on Monday and Thursday.   15. Anxiolytics, antinausea, antidiarrhea medications as needed   16. Lasix PRN while being aggressively hydrated to avoid VOD   17. Nutritional support, pain management

## 2019-02-07 NOTE — H&P ADULT - ATTENDING COMMENTS
Patient is a 58 year old female with PMH Boyle chromosome positive ALL diagnosed in 2016 s/p R HyperCVAD X 4 cycles, IT MTX X 2, s/p autologous stem cell transplantation with melphalan conditioning (12/16/16) complicated by shingles infection, who presented in October 2018 with subdural hemorrhage and relapsed disease confirmed by peripheral blood flow cytometry treated with Inotuzumab X 2 cycles.  Bone marrow biopsy on 1/23/19 showed remission with FISH and PCR for BCR-ABL translocation negative on the BM specimen but peripheral blood testing on 1/31 showed .004% BCR-ABL transcript with negative FISH suggesting MRD positivity.  Patient was on Ponatinib, DC 1/31/19.     Patient was admitted for  haploidentical allogeneic stem cell transplantation with fludarabine/cytoxan/TBI conditioning.  The donor is her son Ponce.    Patient complaint of flu-like symptoms, CxR were negative for pneumonia.     Start Acyclovir, Fluconazole. Bactrim through day -2.     History of SDH, continue Keppra.     History of HTN continue Losartan.     VOD prophylaxis as per protocol.

## 2019-02-07 NOTE — H&P ADULT - HISTORY OF PRESENT ILLNESS
HPI:    Patient is a 58 year old female with PMH [place] chromosome positive ALL diagnosed in 2016 s/p R HyperCVAD X 4 cycles, IT MTX X 2, s/p autologous stem cell transplantation with melphelan conditioning (12/16/16) complicated by shingles infection, who presented in October 2018 with subdural hemorrhage and relapsed disease confirmed by peripheral blood flow cytometry treated with Inotuzumab X 2 cycles with course complicated by refractory thrombocytopenia requiring HLA patched platelets, pneumonia, coag negative staph bacteremia treated with vancomycin, who now presents for haploidentical allogeneic stem cell transplantation with fludarabine/cytoxan/TBI conditioning followed by GVHD prophylaxis with Cytoxan, cellcept X 30 days and tacrolimus X 180 days. The donor is her son Ponce.    Her pre-transplant testing included ECHO with EF of 48%, PFTs showing decreased DLCO (68%), but normal lung volumes and spirometry. Bone marrow biopsy on 1/23/19 showed remission with FISH and PCR for BCR-ABL translocation negative on the BM specimen but peripheral blood testing on 1/31 showed .004% BCR-ABL transcript with negative FISH suggesting MRD positivity.  She received Kepivance on    She does have HLA antibodies directed against her son. Her son will start G-CSF injection on 2/8 and will collect stem cells on 2/12. Stem cell infusion planned tentatively for 2/13/19.    She has been on ponatinib 45mg daily, but it was recently stopped and she was placed on actigal.    Currently she reports productive cough and mild SOB. Denies fevers, nausea, vomiting, diarrhea, abdominal pain, chest discomfort, rash. She reports swelling and abnormal sensation around her lips    Patient has additional PMH of Obesity, history of C Diff colitis. HPI:    Patient is a 58 year old female with PMH philadelphia chromosome positive ALL diagnosed in 2016 s/p R HyperCVAD X 4 cycles, IT MTX X 2, s/p autologous stem cell transplantation with melphelan conditioning (12/16/16) complicated by shingles infection, who presented in October 2018 with subdural hemorrhage and relapsed disease confirmed by peripheral blood flow cytometry treated with Inotuzumab X 2 cycles with course complicated by refractory thrombocytopenia requiring HLA patched platelets, pneumonia, coag negative staph bacteremia treated with vancomycin, and additional PMH Obesity, Migraines, HTN, GERD, who now presents for haploidentical allogeneic stem cell transplantation with fludarabine/cytoxan/TBI conditioning followed by GVHD prophylaxis with Cytoxan, cellcept X 30 days and tacrolimus X 180 days. The donor is her son Ponce.    Her pre-transplant testing included ECHO with EF of 48%, PFTs showing decreased DLCO (68%), but normal lung volumes and spirometry. Bone marrow biopsy on 1/23/19 showed remission with FISH and PCR for BCR-ABL translocation negative on the BM specimen but peripheral blood testing on 1/31 showed .004% BCR-ABL transcript with negative FISH suggesting MRD positivity.  She received Kepivance on 2/4, 2/5 and 2/6. She reports abnormal sensation in her lips and some swelling but denies any rash.    She does have HLA antibodies directed against her son. Her son will start G-CSF injection on 2/8 and will collect stem cells on 2/12. Stem cell infusion planned tentatively for 2/13/19.    She has been on ponatinib 45mg daily, but it was recently stopped and she was placed on actigal.    Currently she reports productive cough and mild SOB. Denies fevers, nausea, vomiting, diarrhea, abdominal pain, chest discomfort, rash. HPI:    Patient is a 58 year old female with PMH philadelphia chromosome positive ALL diagnosed in 2016 s/p R HyperCVAD X 4 cycles, IT MTX X 2, s/p autologous stem cell transplantation with melphelan conditioning (12/16/16) complicated by shingles infection, who presented in October 2018 with subdural hemorrhage and relapsed disease confirmed by peripheral blood flow cytometry treated with Inotuzumab X 2 cycles with course complicated by refractory thrombocytopenia requiring HLA patched platelets, pneumonia, coag negative staph bacteremia treated with vancomycin, and additional PMH Obesity, Migraines, HTN, GERD, who now presents for haploidentical allogeneic stem cell transplantation with fludarabine/cytoxan/TBI conditioning followed by GVHD prophylaxis with Cytoxan, cellcept X 30 days and tacrolimus X 180 days. The donor is her son Ponce.    Her pre-transplant testing included ECHO with EF of 48% MUGA in November 2018 showed EF 44%, PFTs showing decreased DLCO (68%), but normal lung volumes and spirometry. Bone marrow biopsy on 1/23/19 showed remission with FISH and PCR for BCR-ABL translocation negative on the BM specimen but peripheral blood testing on 1/31 showed .004% BCR-ABL transcript with negative FISH suggesting MRD positivity.  She received Kepivance on 2/4, 2/5 and 2/6. She reports abnormal sensation in her lips and some swelling but denies any rash.    She does have HLA antibodies directed against her son. Her son will start G-CSF injection on 2/8 and will collect stem cells on 2/12. Stem cell infusion planned tentatively for 2/13/19.    She has been on ponatinib 45mg daily, but it was recently stopped and she was placed on actigal.    Currently she reports productive cough and mild SOB. Denies fevers, nausea, vomiting, diarrhea, abdominal pain, chest discomfort, rash. HPI:    Patient is a 58 year old female with PMH philadelphia chromosome positive ALL diagnosed in 2016 s/p R HyperCVAD X 4 cycles, IT MTX X 2, s/p autologous stem cell transplantation with melphelan conditioning (12/16/16) complicated by shingles infection, who presented in October 2018 with subdural hemorrhage and relapsed disease confirmed by peripheral blood flow cytometry treated with Inotuzumab X 2 cycles with course complicated by refractory thrombocytopenia requiring HLA patched platelets, pneumonia, coag negative staph bacteremia treated with vancomycin, and additional PMH Obesity, Migraines, HTN, GERD, who now presents for haploidentical allogeneic stem cell transplantation with fludarabine/cytoxan/TBI conditioning followed by GVHD prophylaxis with Cytoxan, cellcept X 30 days and tacrolimus X 180 days. The donor is her son Ponce.    Her pre-transplant testing included ECHO with EF of 48% MUGA in November 2018 showed EF 44%, PFTs showing decreased DLCO (68%), but normal lung volumes and spirometry. Bone marrow biopsy on 1/23/19 showed remission with FISH and PCR for BCR-ABL translocation negative on the BM specimen but peripheral blood testing on 1/31 showed .004% BCR-ABL transcript with negative FISH suggesting MRD positivity.  She received Kepivance on 2/4, 2/5 and 2/6. She reports abnormal sensation in her lips and some swelling but denies any rash.    She does have HLA antibodies directed against her son. Her son will start G-CSF injection on 2/8 and will collect stem cells on 2/12. Stem cell infusion planned tentatively for 2/13/19.    She has been on ponatinib 45mg daily, but it was recently stopped and she was placed on actigal.    Currently she reports productive cough and mild SOB. Denies fevers, nausea, vomiting, diarrhea, abdominal pain, chest discomfort, rash. Patient went for 2V CXR on admisison without evidence of pneumonia. HPI:    Patient is a 58 year old female with PMH philadelphia chromosome positive ALL diagnosed in 2016 s/p R HyperCVAD X 4 cycles, IT MTX X 2, s/p autologous stem cell transplantation with melphelan conditioning (12/16/16) complicated by shingles infection, who presented in October 2018 with subdural hemorrhage and relapsed disease confirmed by peripheral blood flow cytometry treated with Inotuzumab X 2 cycles with course complicated by refractory thrombocytopenia requiring HLA patched platelets, pneumonia, coag negative staph bacteremia treated with vancomycin, and additional PMH Obesity, Migraines, HTN, GERD, who now presents for haploidentical allogeneic stem cell transplantation with fludarabine/cytoxan/TBI conditioning followed by GVHD prophylaxis with Cytoxan, cellcept X 30 days and tacrolimus X 180 days. The donor is her son Ponce.    Her pre-transplant testing included ECHO with EF of 48% MUGA in November 2018 showed EF 44%, PFTs showing decreased DLCO (68%), but normal lung volumes and spirometry. Bone marrow biopsy on 1/23/19 showed remission with FISH and PCR for BCR-ABL translocation negative on the BM specimen but peripheral blood testing on 1/31 showed .004% BCR-ABL transcript with negative FISH suggesting MRD positivity.  She received Kepivance on 2/4, 2/5 and 2/6. She reports abnormal sensation in her lips and some swelling but denies any rash.    She does have HLA antibodies directed against her son. Her son will start G-CSF injection on 2/8 and will collect stem cells on 2/12. Stem cell infusion planned tentatively for 2/13/19.    She has been on ponatinib 45mg every other day due to LFT abnormalities, but it was recently stopped (1/31/19) and she was placed on actigal for VOD prophylaxis.    Currently she reports productive cough and mild SOB. Denies fevers, nausea, vomiting, diarrhea, abdominal pain, chest discomfort, rash. Patient went for 2V CXR on admission without evidence of pneumonia.

## 2019-02-07 NOTE — H&P ADULT - ASSESSMENT
Patient is a 58 year old female with PMH philadelphia chromosome positive ALL diagnosed in 2016 s/p R HyperCVAD X 4 cycles, IT MTX X 2, s/p autologous stem cell transplantation with melphelan conditioning (12/16/16) complicated by shingles infection, who presented in October 2018 with subdural hemorrhage and relapsed disease confirmed by peripheral blood flow cytometry treated with Inotuzumab X 2 cycles with course complicated by refractory thrombocytopenia requiring HLA patched platelets, pneumonia, coag negative staph bacteremia treated with vancomycin, who now presents for haploidentical allogeneic stem cell transplantation with fludarabine/cytoxan/TBI conditioning followed by GVHD prophylaxis with Cytoxan, cellcept X 30 days and tacrolimus X 180 days. The donor is her son Ponce.    Cough  - check RVP and 2V CXR to r/o Pneumonia prior to proceeding with high dose chemotherapy and stem cell transplatnation Patient is a 58 year old female with PMH philadelphia chromosome positive ALL diagnosed in 2016 s/p R HyperCVAD X 4 cycles, IT MTX X 2, s/p autologous stem cell transplantation with melphelan conditioning (12/16/16) complicated by shingles infection, who presented in October 2018 with subdural hemorrhage and relapsed disease confirmed by peripheral blood flow cytometry treated with Inotuzumab X 2 cycles with course complicated by refractory thrombocytopenia requiring HLA patched platelets, pneumonia, coag negative staph bacteremia treated with vancomycin, who now presents for haploidentical allogeneic stem cell transplantation with fludarabine/cytoxan/TBI conditioning followed by GVHD prophylaxis with Cytoxan, cellcept X 30 days and tacrolimus X 180 days. The donor is her son Ponce. Patient's most recent testing suggest MRD positivity with + BCR-ABL transcript but BM biopsy morphologically normal on 1/23/19.    Cough  - RVP and 2V CXR negative for etiology    History of subdural hemorrhage  - patient self-discontinued Keppra because she did not have refill, will need to d/w Neurosurgery if this needs to be continued at this time  - check CT head

## 2019-02-07 NOTE — H&P ADULT - PROBLEM SELECTOR PLAN 3
1. Discussed case with neurosurgery, since patient has been free of seizures, ok to stop Keppra for now   2. Get baseline CT head now for baseline, in case we need to reimage later (example if headache in the setting of thrombocytopenia)

## 2019-02-07 NOTE — PROGRESS NOTE ADULT - SUBJECTIVE AND OBJECTIVE BOX
58 year old female with PMH philadelphia chromosome positive ALL diagnosed in 2016 s/p R HyperCVAD X 4 cycles, IT MTX X 2, s/p autologous stem cell transplantation with melphelan conditioning (16) complicated by shingles infection, who presented in 2018 with subdural hemorrhage and relapsed disease confirmed by peripheral blood flow cytometry treated with Inotuzumab X 2 cycles with course complicated by refractory thrombocytopenia requiring HLA patched platelets, pneumonia, coag negative staph bacteremia treated with vancomycin, and additional PMH Obesity, Migraines, HTN, GERD, who now presents for haploidentical allogeneic stem cell transplantation with fludarabine/cytoxan/TBI conditioning followed by GVHD prophylaxis with Cytoxan, cellcept X 30 days and tacrolimus X 180 days. The donor is her son Ponce.  pt referred to IR For TLC placement for venous access.    Allergies: No Known Drug Allergies  shellfish (Nausea; Vomiting)      PAST MEDICAL & SURGICAL HISTORY:  Herpes zoster  Leukemia  Clostridium difficile diarrhea  Intractable migraine with status migrainosus, unspecified migraine type  Sciatica of right side  Chronic gastritis, presence of bleeding unspecified, unspecified gastritis type  Essential hypertension  Lipoma: left side  Elective surgical procedure: ommaya placement  History of  delivery        Pertinent labs:                      14.1   8.6   )-----------( 95       ( 2019 16:53 )             42.3   -    143  |  107  |  11  ----------------------------<  92  3.9   |  25  |  0.80    Basic Metabolic Panel w/Ionized Calcium (19 @ 17:16)    Blood Gas Arterial - Calcium, Ionized: 1.34 mmol/L    Sodium, Serum: 143 mmoL/L    Potassium, Serum: 3.9: Test repeated. mmol/L    Chloride, Serum: 107 mmoL/L    Carbon Dioxide, Serum: 25 mmol/L    Blood Urea Nitrogen, Serum: 11 mg/dL    Creatinine, Serum: 0.80 mg/dL    Glucose, Serum: 92 mg/dL    Consent: Procedure/risks/ Benefits explained. Informed consent obtained. Pt verbalizes understanding.

## 2019-02-07 NOTE — ADVANCED PRACTICE NURSE CONSULT - ASSESSMENT
Pt. a/ox4,denies any discomfort at this time.Pt. verbalized understanding od chemotherapy infusion.Right neck TLC intact and patent.Dsg dry and intact.Site with no redness,swelling or pain,positive blood reurn noted and flushing easily with 10 ML NS.Drug verification done by 2 RN.'s.Pt. received Zofran 8 mg IVP and Emend 125 mg po as premedication given.DAy -6 At 1758 fludarabine IVPB (eMAR) {Ordered as FLUDARA EMAR}  51 milliGRAM(s) in sodium chloride 0.9% 100 milliLiter(s), IV Intermittent infused over 30 minutes to TLC brown port via alaris pump.Pt. tolerated well.  .

## 2019-02-08 ENCOUNTER — OUTPATIENT (OUTPATIENT)
Dept: OUTPATIENT SERVICES | Facility: HOSPITAL | Age: 59
LOS: 1 days | Discharge: HOME | End: 2019-02-08
Payer: MEDICARE

## 2019-02-08 DIAGNOSIS — D17.9 BENIGN LIPOMATOUS NEOPLASM, UNSPECIFIED: Chronic | ICD-10-CM

## 2019-02-08 DIAGNOSIS — Z98.89 OTHER SPECIFIED POSTPROCEDURAL STATES: Chronic | ICD-10-CM

## 2019-02-08 DIAGNOSIS — Z41.9 ENCOUNTER FOR PROCEDURE FOR PURPOSES OTHER THAN REMEDYING HEALTH STATE, UNSPECIFIED: Chronic | ICD-10-CM

## 2019-02-08 LAB
ALBUMIN SERPL ELPH-MCNC: 3.8 G/DL — SIGNIFICANT CHANGE UP (ref 3.3–5)
ALP SERPL-CCNC: 251 U/L — HIGH (ref 40–120)
ALT FLD-CCNC: 45 U/L — SIGNIFICANT CHANGE UP (ref 10–45)
ANION GAP SERPL CALC-SCNC: 11 MMOL/L — SIGNIFICANT CHANGE UP (ref 5–17)
AST SERPL-CCNC: 28 U/L — SIGNIFICANT CHANGE UP (ref 10–40)
BACTERIA # UR AUTO: ABNORMAL
BASOPHILS # BLD AUTO: 0 K/UL — SIGNIFICANT CHANGE UP (ref 0–0.2)
BASOPHILS NFR BLD AUTO: 0.8 % — SIGNIFICANT CHANGE UP (ref 0–2)
BILIRUB DIRECT SERPL-MCNC: <0.1 MG/DL — SIGNIFICANT CHANGE UP (ref 0–0.2)
BILIRUB INDIRECT FLD-MCNC: >0.1 MG/DL — LOW (ref 0.2–1)
BILIRUB SERPL-MCNC: 0.2 MG/DL — SIGNIFICANT CHANGE UP (ref 0.2–1.2)
BUN SERPL-MCNC: 11 MG/DL — SIGNIFICANT CHANGE UP (ref 7–23)
CALCIUM SERPL-MCNC: 9.3 MG/DL — SIGNIFICANT CHANGE UP (ref 8.4–10.5)
CHLORIDE SERPL-SCNC: 104 MMOL/L — SIGNIFICANT CHANGE UP (ref 96–108)
CO2 SERPL-SCNC: 25 MMOL/L — SIGNIFICANT CHANGE UP (ref 22–31)
CREAT SERPL-MCNC: 0.85 MG/DL — SIGNIFICANT CHANGE UP (ref 0.5–1.3)
EOSINOPHIL # BLD AUTO: 0.2 K/UL — SIGNIFICANT CHANGE UP (ref 0–0.5)
EOSINOPHIL NFR BLD AUTO: 3.3 % — SIGNIFICANT CHANGE UP (ref 0–6)
EPI CELLS # UR: 10 /HPF — HIGH (ref 0–5)
GLUCOSE SERPL-MCNC: 92 MG/DL — SIGNIFICANT CHANGE UP (ref 70–99)
HCT VFR BLD CALC: 36.3 % — SIGNIFICANT CHANGE UP (ref 34.5–45)
HCV AB S/CO SERPL IA: 0.08 S/CO — SIGNIFICANT CHANGE UP
HCV AB SERPL-IMP: SIGNIFICANT CHANGE UP
HGB BLD-MCNC: 12.3 G/DL — SIGNIFICANT CHANGE UP (ref 11.5–15.5)
HYALINE CASTS # UR AUTO: 9 /LPF — HIGH (ref 0–7)
IGA FLD-MCNC: 24 MG/DL — LOW (ref 84–499)
IGD SER-MCNC: <1 MG/DL — SIGNIFICANT CHANGE UP
IGE SERPL-ACNC: 2 IU/ML — SIGNIFICANT CHANGE UP (ref 0–100)
IGG FLD-MCNC: 654 MG/DL — SIGNIFICANT CHANGE UP (ref 610–1660)
IGM SERPL-MCNC: 16 MG/DL — LOW (ref 35–242)
KAPPA LC SER QL IFE: 0.36 MG/DL — SIGNIFICANT CHANGE UP (ref 0.33–1.94)
KAPPA/LAMBDA FREE LIGHT CHAIN RATIO, SERUM: 0.59 RATIO — SIGNIFICANT CHANGE UP (ref 0.26–1.65)
LAMBDA LC SER QL IFE: 0.61 MG/DL — SIGNIFICANT CHANGE UP (ref 0.57–2.63)
LDH SERPL L TO P-CCNC: 159 U/L — SIGNIFICANT CHANGE UP (ref 50–242)
LYMPHOCYTES # BLD AUTO: 1.9 K/UL — SIGNIFICANT CHANGE UP (ref 1–3.3)
LYMPHOCYTES # BLD AUTO: 33.2 % — SIGNIFICANT CHANGE UP (ref 13–44)
MAGNESIUM SERPL-MCNC: 1.9 MG/DL — SIGNIFICANT CHANGE UP (ref 1.6–2.6)
MCHC RBC-ENTMCNC: 33.9 GM/DL — SIGNIFICANT CHANGE UP (ref 32–36)
MCHC RBC-ENTMCNC: 35.5 PG — HIGH (ref 27–34)
MCV RBC AUTO: 105 FL — HIGH (ref 80–100)
MONOCYTES # BLD AUTO: 0.8 K/UL — SIGNIFICANT CHANGE UP (ref 0–0.9)
MONOCYTES NFR BLD AUTO: 14.2 % — HIGH (ref 2–14)
NEUTROPHILS # BLD AUTO: 2.8 K/UL — SIGNIFICANT CHANGE UP (ref 1.8–7.4)
NEUTROPHILS NFR BLD AUTO: 48.6 % — SIGNIFICANT CHANGE UP (ref 43–77)
PHOSPHATE SERPL-MCNC: 4.3 MG/DL — SIGNIFICANT CHANGE UP (ref 2.5–4.5)
PLATELET # BLD AUTO: 74 K/UL — LOW (ref 150–400)
POTASSIUM SERPL-MCNC: 3.5 MMOL/L — SIGNIFICANT CHANGE UP (ref 3.5–5.3)
POTASSIUM SERPL-SCNC: 3.5 MMOL/L — SIGNIFICANT CHANGE UP (ref 3.5–5.3)
PROT SERPL-MCNC: 6.1 G/DL — SIGNIFICANT CHANGE UP (ref 6–8.3)
RBC # BLD: 3.47 M/UL — LOW (ref 3.8–5.2)
RBC # FLD: 12.7 % — SIGNIFICANT CHANGE UP (ref 10.3–14.5)
RBC CASTS # UR COMP ASSIST: 2 /HPF — SIGNIFICANT CHANGE UP (ref 0–4)
SODIUM SERPL-SCNC: 140 MMOL/L — SIGNIFICANT CHANGE UP (ref 135–145)
SP GR UR STRIP: 1 — LOW (ref 1.01–1.02)
WBC # BLD: 5.7 K/UL — SIGNIFICANT CHANGE UP (ref 3.8–10.5)
WBC # FLD AUTO: 5.7 K/UL — SIGNIFICANT CHANGE UP (ref 3.8–10.5)
WBC UR QL: 17 /HPF — HIGH (ref 0–5)

## 2019-02-08 PROCEDURE — 70450 CT HEAD/BRAIN W/O DYE: CPT | Mod: 26

## 2019-02-08 PROCEDURE — 99232 SBSQ HOSP IP/OBS MODERATE 35: CPT

## 2019-02-08 RX ORDER — FUROSEMIDE 40 MG
40 TABLET ORAL EVERY 6 HOURS
Qty: 0 | Refills: 0 | Status: COMPLETED | OUTPATIENT
Start: 2019-02-08 | End: 2019-02-08

## 2019-02-08 RX ORDER — LEVETIRACETAM 250 MG/1
500 TABLET, FILM COATED ORAL
Qty: 0 | Refills: 0 | Status: DISCONTINUED | OUTPATIENT
Start: 2019-02-08 | End: 2019-03-04

## 2019-02-08 RX ORDER — OXYCODONE HYDROCHLORIDE 5 MG/1
5 TABLET ORAL EVERY 6 HOURS
Qty: 0 | Refills: 0 | Status: ACTIVE | OUTPATIENT
Start: 2019-02-08 | End: 2019-02-15

## 2019-02-08 RX ORDER — CIPROFLOXACIN LACTATE 400MG/40ML
500 VIAL (ML) INTRAVENOUS EVERY 12 HOURS
Qty: 0 | Refills: 0 | Status: DISCONTINUED | OUTPATIENT
Start: 2019-02-08 | End: 2019-02-14

## 2019-02-08 RX ADMIN — SODIUM CHLORIDE 200 MILLILITER(S): 9 INJECTION, SOLUTION INTRAVENOUS at 05:41

## 2019-02-08 RX ADMIN — SODIUM CHLORIDE 200 MILLILITER(S): 9 INJECTION, SOLUTION INTRAVENOUS at 22:45

## 2019-02-08 RX ADMIN — OXYCODONE HYDROCHLORIDE 5 MILLIGRAM(S): 5 TABLET ORAL at 22:45

## 2019-02-08 RX ADMIN — Medication 10 MILLILITER(S): at 09:19

## 2019-02-08 RX ADMIN — ONDANSETRON 8 MILLIGRAM(S): 8 TABLET, FILM COATED ORAL at 09:20

## 2019-02-08 RX ADMIN — Medication 1 LOZENGE: at 12:01

## 2019-02-08 RX ADMIN — OXYCODONE HYDROCHLORIDE 5 MILLIGRAM(S): 5 TABLET ORAL at 17:15

## 2019-02-08 RX ADMIN — FLUDARABINE PHOSPHATE 204.08 MILLIGRAM(S): 25 INJECTION, SOLUTION INTRAVENOUS at 16:58

## 2019-02-08 RX ADMIN — LOSARTAN POTASSIUM 100 MILLIGRAM(S): 100 TABLET, FILM COATED ORAL at 05:40

## 2019-02-08 RX ADMIN — ONDANSETRON 8 MILLIGRAM(S): 8 TABLET, FILM COATED ORAL at 03:45

## 2019-02-08 RX ADMIN — OXYCODONE HYDROCHLORIDE 5 MILLIGRAM(S): 5 TABLET ORAL at 16:31

## 2019-02-08 RX ADMIN — Medication 299 MILLIGRAM(S): at 21:00

## 2019-02-08 RX ADMIN — Medication 5 MILLILITER(S): at 09:19

## 2019-02-08 RX ADMIN — Medication 100 MILLIGRAM(S): at 09:21

## 2019-02-08 RX ADMIN — Medication 1 TABLET(S): at 12:02

## 2019-02-08 RX ADMIN — URSODIOL 300 MILLIGRAM(S): 250 TABLET, FILM COATED ORAL at 09:19

## 2019-02-08 RX ADMIN — OXYCODONE HYDROCHLORIDE 5 MILLIGRAM(S): 5 TABLET ORAL at 10:10

## 2019-02-08 RX ADMIN — MESNA 453.48 MILLIGRAM(S): 100 INJECTION, SOLUTION INTRAVENOUS at 20:24

## 2019-02-08 RX ADMIN — Medication 500 MILLIGRAM(S): at 12:04

## 2019-02-08 RX ADMIN — ONDANSETRON 8 MILLIGRAM(S): 8 TABLET, FILM COATED ORAL at 18:13

## 2019-02-08 RX ADMIN — URSODIOL 300 MILLIGRAM(S): 250 TABLET, FILM COATED ORAL at 16:32

## 2019-02-08 RX ADMIN — Medication 10 MILLILITER(S): at 18:13

## 2019-02-08 RX ADMIN — Medication 40 MILLIGRAM(S): at 18:13

## 2019-02-08 RX ADMIN — SODIUM CHLORIDE 20 MILLILITER(S): 9 INJECTION INTRAMUSCULAR; INTRAVENOUS; SUBCUTANEOUS at 05:41

## 2019-02-08 RX ADMIN — Medication 1 LOZENGE: at 18:13

## 2019-02-08 RX ADMIN — Medication 10 MILLILITER(S): at 12:02

## 2019-02-08 RX ADMIN — Medication 1 LOZENGE: at 09:19

## 2019-02-08 RX ADMIN — SODIUM CHLORIDE 200 MILLILITER(S): 9 INJECTION, SOLUTION INTRAVENOUS at 12:04

## 2019-02-08 RX ADMIN — FLUCONAZOLE 400 MILLIGRAM(S): 150 TABLET ORAL at 12:01

## 2019-02-08 RX ADMIN — Medication 100 MILLIGRAM(S): at 09:20

## 2019-02-08 RX ADMIN — SODIUM CHLORIDE 20 MILLILITER(S): 9 INJECTION INTRAMUSCULAR; INTRAVENOUS; SUBCUTANEOUS at 22:45

## 2019-02-08 RX ADMIN — Medication 650 MILLIGRAM(S): at 05:41

## 2019-02-08 RX ADMIN — PANTOPRAZOLE SODIUM 40 MILLIGRAM(S): 20 TABLET, DELAYED RELEASE ORAL at 06:24

## 2019-02-08 RX ADMIN — Medication 5 MILLILITER(S): at 18:12

## 2019-02-08 RX ADMIN — Medication 1 MILLIGRAM(S): at 12:01

## 2019-02-08 RX ADMIN — Medication 5 MILLILITER(S): at 20:24

## 2019-02-08 RX ADMIN — OXYCODONE HYDROCHLORIDE 5 MILLIGRAM(S): 5 TABLET ORAL at 23:15

## 2019-02-08 RX ADMIN — LEVETIRACETAM 500 MILLIGRAM(S): 250 TABLET, FILM COATED ORAL at 18:26

## 2019-02-08 RX ADMIN — Medication 100 MILLIGRAM(S): at 18:15

## 2019-02-08 RX ADMIN — Medication 650 MILLIGRAM(S): at 06:15

## 2019-02-08 RX ADMIN — Medication 5 MILLILITER(S): at 12:01

## 2019-02-08 RX ADMIN — Medication 10 MILLILITER(S): at 20:55

## 2019-02-08 RX ADMIN — APREPITANT 80 MILLIGRAM(S): 80 CAPSULE ORAL at 16:31

## 2019-02-08 RX ADMIN — Medication 1 LOZENGE: at 20:24

## 2019-02-08 RX ADMIN — Medication 1 TABLET(S): at 18:13

## 2019-02-08 RX ADMIN — Medication 40 MILLIGRAM(S): at 12:01

## 2019-02-08 RX ADMIN — Medication 10 MILLIGRAM(S): at 12:33

## 2019-02-08 RX ADMIN — Medication 1 TABLET(S): at 06:24

## 2019-02-08 RX ADMIN — OXYCODONE HYDROCHLORIDE 5 MILLIGRAM(S): 5 TABLET ORAL at 09:20

## 2019-02-08 NOTE — DIETITIAN INITIAL EVALUATION ADULT. - NS AS NUTRI INTERV ED CONTENT
Discussed importance and how to take Glutasolve. Discussed RD remains available as needed to address GI symptoms and work c patient as needed. Discussed importance of adequate PO intake at this time.

## 2019-02-08 NOTE — DIETITIAN INITIAL EVALUATION ADULT. - ENERGY NEEDS
ht: 62.2", wt:245.5 pounds, BMI:44.6 kg/m2, IBW:111 pounds +/- 10%     Pt c AML, admitted for allogeneic PBSCT, day -5.

## 2019-02-08 NOTE — DIETITIAN INITIAL EVALUATION ADULT. - OTHER INFO
Pt seen for LOS for PBSCT admission. Pt reports wt has been relatively stable PTA. Noted wt of about 251 pounds during October 2018 admission, noted admit wt now 245.5 pounds, slight decrease, however only 5 pounds difference at this time.

## 2019-02-08 NOTE — PROGRESS NOTE ADULT - ATTENDING COMMENTS
Patient is a 58 year old female with PMH [place] chromosome positive ALL diagnosed in 2016 s/p R HyperCVAD X 4 cycles, IT MTX X 2, s/p autologous stem cell transplantation with melphalan conditioning (12/16/16),  who presented in October 2018 with subdural hemorrhage and relapsed disease confirmed by peripheral blood flow cytometry treated with Inotuzumab X 2 cycles.  Bone marrow biopsy on 1/23/19 showed remission with FISH and PCR for BCR-ABL translocation negative on the BM specimen but peripheral blood testing on 1/31 showed .004% BCR-ABL transcript with negative FISH suggesting MRD positivity.  Patient was on Ponatinib, DC 1/31/19.     Patient was admitted for  haploidentical allogeneic stem cell transplantation with fludarabine/cytoxan/TBI conditioning. Day -5    Patient complaint of flu-like symptoms on admission, CxR was negative for pneumonia. Viral studies negative.     Continue Acyclovir, Fluconazole. Bactrim through day -2.     History of SDH, continue Keppra. CT scan baseline 2/7/19 negative for SDH.     History of HTN continue Losartan.     VOD prophylaxis as per protocol.

## 2019-02-08 NOTE — DIETITIAN INITIAL EVALUATION ADULT. - ADHERENCE
regular diet at home, reports allergy to shellfish (reaction is tingling and nausea); reports she was taking multivitamin supplement at home

## 2019-02-08 NOTE — CHART NOTE - NSCHARTNOTEFT_GEN_A_CORE
Upon Nutritional Assessment by the Registered Dietitian your patient was determined to meet criteria / has evidence of the following diagnosis/diagnoses:          [ ]  Mild Protein Calorie Malnutrition        [ ]  Moderate Protein Calorie Malnutrition        [ ] Severe Protein Calorie Malnutrition        [ ] Unspecified Protein Calorie Malnutrition        [ ] Underweight / BMI <19        [x] Morbid Obesity / BMI > 40      Findings as based on:  [x] Comprehensive nutrition assessment- BMI of about 44  [ ] Nutrition Focused Physical Exam  [ ] Other:       Nutrition Plan/Recommendations:      Given current medical condition, encouraged healthy, balanced eating while pt c good appetite.     PROVIDER Section:     By signing this assessment you are acknowledging and agree with the diagnosis/diagnoses assigned by the Registered Dietitian    Comments:

## 2019-02-08 NOTE — DIETITIAN INITIAL EVALUATION ADULT. - NS FNS WEIGHT USED FOR CALC
245.5 pounds; IBW for Prot needs, defer fluid needs to medical team as patient is on chemo/admission

## 2019-02-08 NOTE — DIETITIAN INITIAL EVALUATION ADULT. - PERTINENT MEDS FT
Cytoxan, Fludarabine, Mesna, Senna, Colace, Reglan, Zofran, Emend, Protonix, Multivitamin, Folic acid, Caphasol, Lasix, 0.45% NaCl at 200ml/hr, 0.9% NaCl at 20ml/hr

## 2019-02-08 NOTE — DIETITIAN INITIAL EVALUATION ADULT. - FACTORS AFF FOOD INTAKE
pt reports good appetite and intake at this time; denies any chewing/swallowing issues; reports she has been having BMs, reports last BM was soft

## 2019-02-08 NOTE — DIETITIAN INITIAL EVALUATION ADULT. - NS AS NUTRI INTERV ED CONTENT3
Given current condition and purpose of admission, adequate intake of balanced meals of utmost importance over wt loss.

## 2019-02-09 LAB
ALBUMIN SERPL ELPH-MCNC: 3.9 G/DL — SIGNIFICANT CHANGE UP (ref 3.3–5)
ALP SERPL-CCNC: 238 U/L — HIGH (ref 40–120)
ALT FLD-CCNC: 51 U/L — HIGH (ref 10–45)
ANION GAP SERPL CALC-SCNC: 15 MMOL/L — SIGNIFICANT CHANGE UP (ref 5–17)
AST SERPL-CCNC: 31 U/L — SIGNIFICANT CHANGE UP (ref 10–40)
BASOPHILS # BLD AUTO: 0 K/UL — SIGNIFICANT CHANGE UP (ref 0–0.2)
BASOPHILS NFR BLD AUTO: 0.3 % — SIGNIFICANT CHANGE UP (ref 0–2)
BILIRUB SERPL-MCNC: 0.2 MG/DL — SIGNIFICANT CHANGE UP (ref 0.2–1.2)
BUN SERPL-MCNC: 9 MG/DL — SIGNIFICANT CHANGE UP (ref 7–23)
CALCIUM SERPL-MCNC: 8.8 MG/DL — SIGNIFICANT CHANGE UP (ref 8.4–10.5)
CHLORIDE SERPL-SCNC: 103 MMOL/L — SIGNIFICANT CHANGE UP (ref 96–108)
CO2 SERPL-SCNC: 24 MMOL/L — SIGNIFICANT CHANGE UP (ref 22–31)
CREAT SERPL-MCNC: 0.95 MG/DL — SIGNIFICANT CHANGE UP (ref 0.5–1.3)
EOSINOPHIL # BLD AUTO: 0.2 K/UL — SIGNIFICANT CHANGE UP (ref 0–0.5)
EOSINOPHIL NFR BLD AUTO: 4.3 % — SIGNIFICANT CHANGE UP (ref 0–6)
G6PD RBC-CCNC: 16.6 U/G HGB — SIGNIFICANT CHANGE UP (ref 7–20.5)
GLUCOSE SERPL-MCNC: 93 MG/DL — SIGNIFICANT CHANGE UP (ref 70–99)
HCT VFR BLD CALC: 35.5 % — SIGNIFICANT CHANGE UP (ref 34.5–45)
HGB BLD-MCNC: 11.8 G/DL — SIGNIFICANT CHANGE UP (ref 11.5–15.5)
LDH SERPL L TO P-CCNC: 157 U/L — SIGNIFICANT CHANGE UP (ref 50–242)
LYMPHOCYTES # BLD AUTO: 0.6 K/UL — LOW (ref 1–3.3)
LYMPHOCYTES # BLD AUTO: 15.2 % — SIGNIFICANT CHANGE UP (ref 13–44)
MAGNESIUM SERPL-MCNC: 1.9 MG/DL — SIGNIFICANT CHANGE UP (ref 1.6–2.6)
MCHC RBC-ENTMCNC: 33.3 GM/DL — SIGNIFICANT CHANGE UP (ref 32–36)
MCHC RBC-ENTMCNC: 34.6 PG — HIGH (ref 27–34)
MCV RBC AUTO: 104 FL — HIGH (ref 80–100)
MONOCYTES # BLD AUTO: 0.3 K/UL — SIGNIFICANT CHANGE UP (ref 0–0.9)
MONOCYTES NFR BLD AUTO: 6.7 % — SIGNIFICANT CHANGE UP (ref 2–14)
NEUTROPHILS # BLD AUTO: 2.9 K/UL — SIGNIFICANT CHANGE UP (ref 1.8–7.4)
NEUTROPHILS NFR BLD AUTO: 73.6 % — SIGNIFICANT CHANGE UP (ref 43–77)
PHOSPHATE SERPL-MCNC: 3.6 MG/DL — SIGNIFICANT CHANGE UP (ref 2.5–4.5)
PLATELET # BLD AUTO: 72 K/UL — LOW (ref 150–400)
POTASSIUM SERPL-MCNC: 3.6 MMOL/L — SIGNIFICANT CHANGE UP (ref 3.5–5.3)
POTASSIUM SERPL-SCNC: 3.6 MMOL/L — SIGNIFICANT CHANGE UP (ref 3.5–5.3)
PROT SERPL-MCNC: 6 G/DL — SIGNIFICANT CHANGE UP (ref 6–8.3)
RBC # BLD: 3.41 M/UL — LOW (ref 3.8–5.2)
RBC # FLD: 12.8 % — SIGNIFICANT CHANGE UP (ref 10.3–14.5)
SODIUM SERPL-SCNC: 142 MMOL/L — SIGNIFICANT CHANGE UP (ref 135–145)
WBC # BLD: 4 K/UL — SIGNIFICANT CHANGE UP (ref 3.8–10.5)
WBC # FLD AUTO: 4 K/UL — SIGNIFICANT CHANGE UP (ref 3.8–10.5)

## 2019-02-09 PROCEDURE — 99291 CRITICAL CARE FIRST HOUR: CPT

## 2019-02-09 RX ORDER — IMMUNE GLOBULIN (HUMAN) 10 G/100ML
20 INJECTION INTRAVENOUS; SUBCUTANEOUS
Qty: 0 | Refills: 0 | Status: DISCONTINUED | OUTPATIENT
Start: 2019-02-11 | End: 2019-03-04

## 2019-02-09 RX ORDER — HYDROCORTISONE 20 MG
50 TABLET ORAL ONCE
Qty: 0 | Refills: 0 | Status: COMPLETED | OUTPATIENT
Start: 2019-02-13 | End: 2019-02-13

## 2019-02-09 RX ORDER — SODIUM CHLORIDE 9 MG/ML
1000 INJECTION, SOLUTION INTRAVENOUS
Qty: 0 | Refills: 0 | Status: DISCONTINUED | OUTPATIENT
Start: 2019-02-12 | End: 2019-02-15

## 2019-02-09 RX ORDER — ACETAMINOPHEN 500 MG
650 TABLET ORAL
Qty: 0 | Refills: 0 | Status: DISCONTINUED | OUTPATIENT
Start: 2019-02-11 | End: 2019-03-04

## 2019-02-09 RX ORDER — ACETAMINOPHEN 500 MG
650 TABLET ORAL ONCE
Qty: 0 | Refills: 0 | Status: COMPLETED | OUTPATIENT
Start: 2019-02-13 | End: 2019-02-13

## 2019-02-09 RX ORDER — DIPHENHYDRAMINE HCL 50 MG
25 CAPSULE ORAL ONCE
Qty: 0 | Refills: 0 | Status: COMPLETED | OUTPATIENT
Start: 2019-02-13 | End: 2019-02-13

## 2019-02-09 RX ORDER — DIPHENHYDRAMINE HCL 50 MG
25 CAPSULE ORAL
Qty: 0 | Refills: 0 | Status: DISCONTINUED | OUTPATIENT
Start: 2019-02-11 | End: 2019-03-04

## 2019-02-09 RX ADMIN — Medication 5 MILLILITER(S): at 20:31

## 2019-02-09 RX ADMIN — SODIUM CHLORIDE 200 MILLILITER(S): 9 INJECTION, SOLUTION INTRAVENOUS at 05:40

## 2019-02-09 RX ADMIN — Medication 1 LOZENGE: at 20:31

## 2019-02-09 RX ADMIN — FLUDARABINE PHOSPHATE 204.08 MILLIGRAM(S): 25 INJECTION, SOLUTION INTRAVENOUS at 16:25

## 2019-02-09 RX ADMIN — OXYCODONE HYDROCHLORIDE 5 MILLIGRAM(S): 5 TABLET ORAL at 18:35

## 2019-02-09 RX ADMIN — ONDANSETRON 8 MILLIGRAM(S): 8 TABLET, FILM COATED ORAL at 02:24

## 2019-02-09 RX ADMIN — ONDANSETRON 8 MILLIGRAM(S): 8 TABLET, FILM COATED ORAL at 09:52

## 2019-02-09 RX ADMIN — Medication 100 MILLIGRAM(S): at 08:31

## 2019-02-09 RX ADMIN — Medication 1 TABLET(S): at 11:54

## 2019-02-09 RX ADMIN — Medication 5 MILLILITER(S): at 17:14

## 2019-02-09 RX ADMIN — LEVETIRACETAM 500 MILLIGRAM(S): 250 TABLET, FILM COATED ORAL at 05:41

## 2019-02-09 RX ADMIN — Medication 10 MILLILITER(S): at 20:32

## 2019-02-09 RX ADMIN — Medication 1 TABLET(S): at 05:40

## 2019-02-09 RX ADMIN — OXYCODONE HYDROCHLORIDE 5 MILLIGRAM(S): 5 TABLET ORAL at 23:35

## 2019-02-09 RX ADMIN — APREPITANT 80 MILLIGRAM(S): 80 CAPSULE ORAL at 17:14

## 2019-02-09 RX ADMIN — LEVETIRACETAM 500 MILLIGRAM(S): 250 TABLET, FILM COATED ORAL at 17:14

## 2019-02-09 RX ADMIN — OXYCODONE HYDROCHLORIDE 5 MILLIGRAM(S): 5 TABLET ORAL at 23:05

## 2019-02-09 RX ADMIN — Medication 10 MILLILITER(S): at 00:46

## 2019-02-09 RX ADMIN — URSODIOL 300 MILLIGRAM(S): 250 TABLET, FILM COATED ORAL at 17:14

## 2019-02-09 RX ADMIN — Medication 1 LOZENGE: at 17:14

## 2019-02-09 RX ADMIN — Medication 10 MILLIGRAM(S): at 08:32

## 2019-02-09 RX ADMIN — FLUCONAZOLE 400 MILLIGRAM(S): 150 TABLET ORAL at 11:54

## 2019-02-09 RX ADMIN — OXYCODONE HYDROCHLORIDE 5 MILLIGRAM(S): 5 TABLET ORAL at 17:59

## 2019-02-09 RX ADMIN — SENNA PLUS 1 TABLET(S): 8.6 TABLET ORAL at 22:42

## 2019-02-09 RX ADMIN — HEPARIN SODIUM 4.64 UNIT(S)/HR: 5000 INJECTION INTRAVENOUS; SUBCUTANEOUS at 17:58

## 2019-02-09 RX ADMIN — Medication 1 LOZENGE: at 00:40

## 2019-02-09 RX ADMIN — Medication 10 MILLILITER(S): at 08:31

## 2019-02-09 RX ADMIN — Medication 1 LOZENGE: at 08:31

## 2019-02-09 RX ADMIN — Medication 1 LOZENGE: at 11:53

## 2019-02-09 RX ADMIN — Medication 1 MILLIGRAM(S): at 11:54

## 2019-02-09 RX ADMIN — SODIUM CHLORIDE 20 MILLILITER(S): 9 INJECTION INTRAMUSCULAR; INTRAVENOUS; SUBCUTANEOUS at 05:40

## 2019-02-09 RX ADMIN — URSODIOL 300 MILLIGRAM(S): 250 TABLET, FILM COATED ORAL at 08:31

## 2019-02-09 RX ADMIN — Medication 10 MILLILITER(S): at 11:55

## 2019-02-09 RX ADMIN — Medication 5 MILLILITER(S): at 11:53

## 2019-02-09 RX ADMIN — Medication 1 TABLET(S): at 17:14

## 2019-02-09 RX ADMIN — Medication 10 MILLILITER(S): at 17:14

## 2019-02-09 RX ADMIN — SODIUM CHLORIDE 20 MILLILITER(S): 9 INJECTION INTRAMUSCULAR; INTRAVENOUS; SUBCUTANEOUS at 21:51

## 2019-02-09 RX ADMIN — OXYCODONE HYDROCHLORIDE 5 MILLIGRAM(S): 5 TABLET ORAL at 11:54

## 2019-02-09 RX ADMIN — ONDANSETRON 8 MILLIGRAM(S): 8 TABLET, FILM COATED ORAL at 17:14

## 2019-02-09 RX ADMIN — PANTOPRAZOLE SODIUM 40 MILLIGRAM(S): 20 TABLET, DELAYED RELEASE ORAL at 05:41

## 2019-02-09 RX ADMIN — OXYCODONE HYDROCHLORIDE 5 MILLIGRAM(S): 5 TABLET ORAL at 12:30

## 2019-02-09 RX ADMIN — Medication 5 MILLILITER(S): at 00:40

## 2019-02-09 RX ADMIN — LOSARTAN POTASSIUM 100 MILLIGRAM(S): 100 TABLET, FILM COATED ORAL at 05:41

## 2019-02-09 RX ADMIN — SODIUM CHLORIDE 200 MILLILITER(S): 9 INJECTION, SOLUTION INTRAVENOUS at 21:52

## 2019-02-09 RX ADMIN — Medication 100 MILLIGRAM(S): at 22:42

## 2019-02-09 RX ADMIN — Medication 500 MILLIGRAM(S): at 11:54

## 2019-02-09 RX ADMIN — Medication 500 MILLIGRAM(S): at 00:40

## 2019-02-09 RX ADMIN — Medication 5 MILLILITER(S): at 08:31

## 2019-02-09 NOTE — ADVANCED PRACTICE NURSE CONSULT - ASSESSMENT
Pt seen in bed, awake, alert and oriented x 3. Triple lumen central line in place on Rt IJ with dressing dry and intact with no s/s of bleeding, swelling or redness. Brown port with positive blood return and flushing well with 10cc Normal saline. Lab results wnl, and MD aware . Reeducate pt about chemotherapy administration, possible side effects and expected outcome. Pt verbalized understandings. Zofran 8 mg ivss given Q8hrs as ordered..Pt denies any nausea or vomitting. Chemo drug dosage verified with another RN. Day 2 of Cytoxa 990 mg ivss given through Rt IJ TL Pt seen in bed, awake, alert and oriented x 3. Triple lumen central line in place on Rt IJ with dressing dry and intact with no s/s of bleeding, swelling or redness. Brown port with positive blood return and flushing well with 10cc Normal saline. Lab results wnl, and MD aware . Reeducate pt about chemotherapy administration, possible side effects and expected outcome. Pt verbalized understandings. Zofran 8 mg ivss given Q8hrs as ordered..Pt denies any nausea or vomitting. Chemo drug dosage verified with another RN. Mesna 1337 mg ivss given 15mts prior to chemo. Day 2 of Cytoxan 990 mg ivss given at 2100 0n 2/8/2019 over 30 mts through  Rt IJ Triplelumen cathter with positive blood return via Alaris pump.Pt resting in bed comfortably.

## 2019-02-09 NOTE — PROGRESS NOTE ADULT - ATTENDING COMMENTS
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  Bone marrow biopsy on 1/23/19 showed remission with FISH and PCR for BCR-ABL translocation negative on the BM specimen but peripheral blood testing on 1/31 showed .004% BCR-ABL transcript with negative FISH suggesting MRD positivity.  Patient was on Ponatinib, DC 1/31/19.     Patient was admitted for  haploidentical allogeneic stem cell transplantation with fludarabine/cytoxan/TBI conditioning. Day -5    Patient complaint of flu-like symptoms on admission, CxR was negative for pneumonia. Viral studies negative.     Continue Acyclovir, Fluconazole. Bactrim through day -2.     History of SDH, continue Keppra. CT scan baseline 2/7/19 negative for SDH.     History of HTN continue Losartan.     VOD prophylaxis as per protocol. Patient is a 58 year old female with PMH [place] chromosome positive ALL diagnosed in 2016 s/p R HyperCVAD X 4 cycles, IT MTX X 2, s/p autologous stem cell transplantation with melphalan conditioning (12/16/16),  who presented in October 2018 with subdural hemorrhage and relapsed disease confirmed by peripheral blood flow cytometry treated with Inotuzumab X 2 cycles.  Bone marrow biopsy on 1/23/19 showed remission with FISH and PCR for BCR-ABL translocation negative on the BM specimen but peripheral blood testing on 1/31 showed .004% BCR-ABL transcript with negative FISH suggesting MRD positivity.  Patient was on Ponatinib, DC 1/31/19. LFT's improved off drug. CR2    Patient was admitted for  haploidentical allogeneic stem cell transplantation with fludarabine/cytoxan/TBI conditioning. Day -4    Patient complaint of flu-like symptoms on admission, CxR was negative for pneumonia. Viral studies negative.     Continue Acyclovir, Fluconazole. Bactrim through day -1.     History of SDH, continue Keppra. CT scan baseline 2/7/19 negative for SDH.     History of HTN continue Losartan.     VOD prophylaxis as per protocol.

## 2019-02-09 NOTE — ADVANCED PRACTICE NURSE CONSULT - ASSESSMENT
Pt was assessed to be alert, oriented x4. Right IJTLC assessed to have positive blood return, flushed good with normal saline.Fludarabine 51mg IV started to infuse over 30minutes.

## 2019-02-09 NOTE — PROGRESS NOTE ADULT - SUBJECTIVE AND OBJECTIVE BOX
HPC Transplant Team                                                      Critical / Counseling Time Provided: 30 minutes                                                                                                                                                        Chief Complaint:     S: Patient seen and examined with [facility] Transplant Team:   Denies mouth / tongue / throat pain, dyspnea, cough, nausea, vomiting, diarrhea, abdominal pain     O: Vitals:   Vital Signs Last 24 Hrs  T(C): 36.4 (09 Feb 2019 09:57), Max: 37.1 (08 Feb 2019 14:10)  T(F): 97.5 (09 Feb 2019 09:57), Max: 98.7 (08 Feb 2019 14:10)  HR: 84 (09 Feb 2019 09:57) (68 - 90)  BP: 101/67 (09 Feb 2019 09:57) (100/57 - 148/88)  BP(mean): --  RR: 18 (09 Feb 2019 09:57) (18 - 18)  SpO2: 99% (09 Feb 2019 09:57) (98% - 100%)    Admit weight:   Daily     Daily     Intake / Output:   02-08 @ 07:01 - 02-09 @ 07:00  --------------------------------------------------------  IN: 6013.9 mL / OUT: 6225 mL / NET: -211.1 mL    02-09 @ 07:01  -  02-09 @ 11:04  --------------------------------------------------------  IN: 524.2 mL / OUT: 0 mL / NET: 524.2 mL          PE:   Oropharynx:   Oral Mucositis:                                                        Grade:   CVS:   Lungs:   Abdomen:  Extremities:   Gastric Mucositis:                                                  Grade:   Intestinal Mucositis:                                              Grade:   Skin:   TLC:   Neuro:   Pain:     Labs:   CBC Full  -  ( 09 Feb 2019 06:51 )  WBC Count : 4.0 K/uL  Hemoglobin : 11.8 g/dL  Hematocrit : 35.5 %  Platelet Count - Automated : 72 K/uL  Mean Cell Volume : 104.0 fl  Mean Cell Hemoglobin : 34.6 pg  Mean Cell Hemoglobin Concentration : 33.3 gm/dL  Auto Neutrophil # : 2.9 K/uL  Auto Lymphocyte # : 0.6 K/uL  Auto Monocyte # : 0.3 K/uL  Auto Eosinophil # : 0.2 K/uL  Auto Basophil # : 0.0 K/uL  Auto Neutrophil % : 73.6 %  Auto Lymphocyte % : 15.2 %  Auto Monocyte % : 6.7 %  Auto Eosinophil % : 4.3 %  Auto Basophil % : 0.3 %                          11.8   4.0   )-----------( 72       ( 09 Feb 2019 06:51 )             35.5     02-09    142  |  103  |  9   ----------------------------<  93  3.6   |  24  |  0.95    Ca    8.8      09 Feb 2019 06:50  Phos  3.6     02-09  Mg     1.9     02-09    TPro  6.0  /  Alb  3.9  /  TBili  0.2  /  DBili  x   /  AST  31  /  ALT  51<H>  /  AlkPhos  238<H>  02-09    PT/INR - ( 07 Feb 2019 17:26 )   PT: 10.3 sec;   INR: 0.91 ratio         PTT - ( 07 Feb 2019 17:26 )  PTT:34.8 sec  LIVER FUNCTIONS - ( 09 Feb 2019 06:50 )  Alb: 3.9 g/dL / Pro: 6.0 g/dL / ALK PHOS: 238 U/L / ALT: 51 U/L / AST: 31 U/L / GGT: x           Lactate Dehydrogenase, Serum: 157 U/L (02-09 @ 06:50)          Karnofsky / Lansky Scale:   GVHD:   Skin:   Liver:   Gut:   Overall Grade:       Cultures:         Radiology:       Meds:   Antimicrobials:   ciprofloxacin     Tablet 500 milliGRAM(s) Oral every 12 hours  clotrimazole Lozenge 1 Lozenge Oral five times a day  fluconAZOLE   Tablet 400 milliGRAM(s) Oral daily  trimethoprim  160 mG/sulfamethoxazole 800 mG 1 Tablet(s) Oral every 12 hours      Heme / Onc:   fludarabine IVPB (eMAR) 51 milliGRAM(s) IV Intermittent every 24 hours  heparin  Infusion 464 Unit(s)/Hr IV Continuous <Continuous>      GI:  docusate sodium 100 milliGRAM(s) Oral three times a day PRN  pantoprazole    Tablet 40 milliGRAM(s) Oral before breakfast  senna 1 Tablet(s) Oral at bedtime PRN  sodium bicarbonate Mouth Rinse 10 milliLiter(s) Swish and Spit five times a day  ursodiol Capsule 300 milliGRAM(s) Oral two times a day with meals      Cardiovascular:   losartan 100 milliGRAM(s) Oral daily      Immunologic:       Other medications:   acetaminophen   Tablet .. 650 milliGRAM(s) Oral every 6 hours  aprepitant 80 milliGRAM(s) Oral every 24 hours  Biotene Dry Mouth Oral Rinse 5 milliLiter(s) Swish and Spit five times a day  folic acid 1 milliGRAM(s) Oral daily  levETIRAcetam 500 milliGRAM(s) Oral two times a day  multivitamin 1 Tablet(s) Oral daily  ondansetron Injectable 8 milliGRAM(s) IV Push every 8 hours  sodium chloride 0.45%. 1000 milliLiter(s) IV Continuous <Continuous>  sodium chloride 0.9%. 1000 milliLiter(s) IV Continuous <Continuous>      PRN:   acetaminophen   Tablet .. 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  Pre Allogeneic PBSCT / BMT day -4   Fludarabine 3/5  H/o subdural hematomas on Keppra in past, lost to follow-up (Neurologist is Dr. Headley).  Neurosurgery consult on 2/7, no need for Keppra because has been seizure-free.  Neurosurgery recommending to obtain baseline CT Head non-con, no acute intracranial abnormality, possible sinusitus  Cough x 3 weeks, CXR clear, start Tessalon 100 mg PRN, RVP (-), start Cipro 500 mg BID  Chronic back pain- start oxycodone 5 mg IR PRN    1. Infectious Disease:   clotrimazole Lozenge 1 Lozenge Oral five times a day  fluconAZOLE   Tablet 400 milliGRAM(s) Oral daily  trimethoprim  160 mG/sulfamethoxazole 800 mG 1 Tablet(s) Oral every 12 hours    2. VOD Prophylaxis: Actigall, Glutamine, Heparin (dosed at 100 units / kg / day)     3. GI Prophylaxis:  Protonix    4. Mouthcare - NS / NaHCO3 rinses, Mycelex, Caphosol; Skin care     5. GVHD prophylaxis: MMF and Tacro to start on Day 10     6. Transfuse & replete electrolytes prn     7. IV hydration, daily weights, strict I&O, prn diuresis   Lasix 40 mg IVP BID today    8. PO intake as tolerated, nutrition follow up as needed, MVI, folic acid     9. Antiemetics, anti-diarrhea medications:   aprepitant 80 milliGRAM(s) Oral every 24 hours  ondansetron Injectable 8 milliGRAM(s) IV Push every 8 hours  metoclopramide Injectable 10 milliGRAM(s) IV Push every 6 hours PRN    10. OOB as tolerated, physical therapy consult if needed     11. Monitor coags / fibrinogen 2x week, vitamin K as needed     12. Monitor closely for clinical changes, monitor for fevers     13. Emotional support provided, plan of care discussed and questions addressed     14. Patient education done regarding chemotherapy prep, plan of care, restrictions and discharge planning     15. Continue regular social work input      I have written the above note for Dr. Bentley who performed service with me in the room.   Cedric Hernadez  NP-C ([phone])    I have seen and examined patient with NP, I agree with above note as scribed. [facility]PC Transplant Team                                                      Critical / Counseling Time Provided: 30 minutes                                                                                                                                                        Chief Complaint: admitted for stem cell transplant    S: Patient seen and examined with [facility] Transplant Team:   + intermittent nausea  +fatigue  Denies mouth / tongue / throat pain, dyspnea, cough, nausea, vomiting, diarrhea, abdominal pain     O: Vitals:   Vital Signs Last 24 Hrs  T(C): 36.4 (09 Feb 2019 09:57), Max: 37.1 (08 Feb 2019 14:10)  T(F): 97.5 (09 Feb 2019 09:57), Max: 98.7 (08 Feb 2019 14:10)  HR: 84 (09 Feb 2019 09:57) (68 - 90)  BP: 101/67 (09 Feb 2019 09:57) (100/57 - 148/88)  BP(mean): --  RR: 18 (09 Feb 2019 09:57) (18 - 18)  SpO2: 99% (09 Feb 2019 09:57) (98% - 100%)    Admit weight:   Daily     Daily     Intake / Output:   02-08 @ 07:01 - 02-09 @ 07:00  --------------------------------------------------------  IN: 6013.9 mL / OUT: 6225 mL / NET: -211.1 mL    02-09 @ 07:01  - 02-09 @ 11:04  --------------------------------------------------------  IN: 524.2 mL / OUT: 0 mL / NET: 524.2 mL    PE:   Oropharynx: no erythema or ulcers  Oral Mucositis: n/a                                                       stGstrstastdstest:st st1st CVS: S1S2, RRR  Lungs: clear bilaterally  Abdomen: NT/ND, normoactive BS x 4Q  Extremities: no edema  Gastric Mucositis: n/a                                                 stGstrstastdstest:st st1st Intestinal Mucositis: n/a                                             stGstrstastdstest:st st1st Skin: no rashes  TLC: C/D/I  Neuro: nonfocal  Pain: none    Labs:   CBC Full  -  ( 09 Feb 2019 06:51 )  WBC Count : 4.0 K/uL  Hemoglobin : 11.8 g/dL  Hematocrit : 35.5 %  Platelet Count - Automated : 72 K/uL  Mean Cell Volume : 104.0 fl  Mean Cell Hemoglobin : 34.6 pg  Mean Cell Hemoglobin Concentration : 33.3 gm/dL  Auto Neutrophil # : 2.9 K/uL  Auto Lymphocyte # : 0.6 K/uL  Auto Monocyte # : 0.3 K/uL  Auto Eosinophil # : 0.2 K/uL  Auto Basophil # : 0.0 K/uL  Auto Neutrophil % : 73.6 %  Auto Lymphocyte % : 15.2 %  Auto Monocyte % : 6.7 %  Auto Eosinophil % : 4.3 %  Auto Basophil % : 0.3 %                          11.8   4.0   )-----------( 72       ( 09 Feb 2019 06:51 )             35.5     02-09    142  |  103  |  9   ----------------------------<  93  3.6   |  24  |  0.95    Ca    8.8      09 Feb 2019 06:50  Phos  3.6     02-09  Mg     1.9     02-09    TPro  6.0  /  Alb  3.9  /  TBili  0.2  /  DBili  x   /  AST  31  /  ALT  51<H>  /  AlkPhos  238<H>  02-09    PT/INR - ( 07 Feb 2019 17:26 )   PT: 10.3 sec;   INR: 0.91 ratio         PTT - ( 07 Feb 2019 17:26 )  PTT:34.8 sec  LIVER FUNCTIONS - ( 09 Feb 2019 06:50 )  Alb: 3.9 g/dL / Pro: 6.0 g/dL / ALK PHOS: 238 U/L / ALT: 51 U/L / AST: 31 U/L / GGT: x           Lactate Dehydrogenase, Serum: 157 U/L (02-09 @ 06:50)    Meds:   Antimicrobials:   ciprofloxacin     Tablet 500 milliGRAM(s) Oral every 12 hours  clotrimazole Lozenge 1 Lozenge Oral five times a day  fluconAZOLE   Tablet 400 milliGRAM(s) Oral daily  trimethoprim  160 mG/sulfamethoxazole 800 mG 1 Tablet(s) Oral every 12 hours      Heme / Onc:   fludarabine IVPB (eMAR) 51 milliGRAM(s) IV Intermittent every 24 hours  heparin  Infusion 464 Unit(s)/Hr IV Continuous <Continuous>      GI:  docusate sodium 100 milliGRAM(s) Oral three times a day PRN  pantoprazole    Tablet 40 milliGRAM(s) Oral before breakfast  senna 1 Tablet(s) Oral at bedtime PRN  sodium bicarbonate Mouth Rinse 10 milliLiter(s) Swish and Spit five times a day  ursodiol Capsule 300 milliGRAM(s) Oral two times a day with meals      Cardiovascular:   losartan 100 milliGRAM(s) Oral daily    Other medications:   acetaminophen   Tablet .. 650 milliGRAM(s) Oral every 6 hours  aprepitant 80 milliGRAM(s) Oral every 24 hours  Biotene Dry Mouth Oral Rinse 5 milliLiter(s) Swish and Spit five times a day  folic acid 1 milliGRAM(s) Oral daily  levETIRAcetam 500 milliGRAM(s) Oral two times a day  multivitamin 1 Tablet(s) Oral daily  ondansetron Injectable 8 milliGRAM(s) IV Push every 8 hours  sodium chloride 0.45%. 1000 milliLiter(s) IV Continuous <Continuous>  sodium chloride 0.9%. 1000 milliLiter(s) IV Continuous <Continuous>      PRN:   acetaminophen   Tablet .. 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  Pre Allogeneic PBSCT / BMT day - 4   Fludarabine 3/5  H/o subdural hematomas on Keppra in past, lost to follow-up (Neurologist is Dr. Headley).  Neurosurgery consult on 2/7, no need for Keppra because has been seizure-free.  Neurosurgery recommending to obtain baseline CT Head non-con, no acute intracranial abnormality, possible sinusitus  Cough x 3 weeks, CXR clear, start Tessalon 100 mg PRN, RVP (-), start Cipro 500 mg BID  Chronic back pain- start oxycodone 5 mg IR PRN    1. Infectious Disease:   clotrimazole Lozenge 1 Lozenge Oral five times a day  fluconAZOLE   Tablet 400 milliGRAM(s) Oral daily  trimethoprim  160 mG/sulfamethoxazole 800 mG 1 Tablet(s) Oral every 12 hours    2. VOD Prophylaxis: Actigall, Glutamine, Heparin (dosed at 100 units / kg / day)     3. GI Prophylaxis:  Protonix    4. Mouthcare - NS / NaHCO3 rinses, Mycelex, Caphosol; Skin care     5. GVHD prophylaxis: MMF and Tacro to start on Day 10     6. Transfuse & replete electrolytes prn     7. IV hydration, daily weights, strict I&O, prn diuresis   Lasix 40 mg IVP BID today    8. PO intake as tolerated, nutrition follow up as needed, MVI, folic acid     9. Antiemetics, anti-diarrhea medications:   aprepitant 80 milliGRAM(s) Oral every 24 hours  ondansetron Injectable 8 milliGRAM(s) IV Push every 8 hours  metoclopramide Injectable 10 milliGRAM(s) IV Push every 6 hours PRN    10. OOB as tolerated, physical therapy consult if needed     11. Monitor coags / fibrinogen 2x week, vitamin K as needed     12. Monitor closely for clinical changes, monitor for fevers     13. Emotional support provided, plan of care discussed and questions addressed     14. Patient education done regarding chemotherapy prep, plan of care, restrictions and discharge planning     15. Continue regular social work input      I have written the above note for Dr. Bentley who performed service with me in the room.   Cedric Hernadez  NP-C ([phone])    I have seen and examined patient with NP, I agree with above note as scribed.

## 2019-02-10 LAB
ALBUMIN SERPL ELPH-MCNC: 3.3 G/DL — SIGNIFICANT CHANGE UP (ref 3.3–5)
ALP SERPL-CCNC: 224 U/L — HIGH (ref 40–120)
ALT FLD-CCNC: 43 U/L — SIGNIFICANT CHANGE UP (ref 10–45)
ANION GAP SERPL CALC-SCNC: 11 MMOL/L — SIGNIFICANT CHANGE UP (ref 5–17)
ANISOCYTOSIS BLD QL: SLIGHT — SIGNIFICANT CHANGE UP
AST SERPL-CCNC: 27 U/L — SIGNIFICANT CHANGE UP (ref 10–40)
BASOPHILS # BLD AUTO: 0 K/UL — SIGNIFICANT CHANGE UP (ref 0–0.2)
BASOPHILS NFR BLD AUTO: 0 % — SIGNIFICANT CHANGE UP (ref 0–2)
BILIRUB SERPL-MCNC: 0.2 MG/DL — SIGNIFICANT CHANGE UP (ref 0.2–1.2)
BUN SERPL-MCNC: 8 MG/DL — SIGNIFICANT CHANGE UP (ref 7–23)
CALCIUM SERPL-MCNC: 9.1 MG/DL — SIGNIFICANT CHANGE UP (ref 8.4–10.5)
CHLORIDE SERPL-SCNC: 110 MMOL/L — HIGH (ref 96–108)
CO2 SERPL-SCNC: 22 MMOL/L — SIGNIFICANT CHANGE UP (ref 22–31)
CREAT SERPL-MCNC: 0.84 MG/DL — SIGNIFICANT CHANGE UP (ref 0.5–1.3)
DACRYOCYTES BLD QL SMEAR: SLIGHT — SIGNIFICANT CHANGE UP
EOSINOPHIL # BLD AUTO: 0.1 K/UL — SIGNIFICANT CHANGE UP (ref 0–0.5)
EOSINOPHIL NFR BLD AUTO: 3 % — SIGNIFICANT CHANGE UP (ref 0–6)
GLUCOSE SERPL-MCNC: 94 MG/DL — SIGNIFICANT CHANGE UP (ref 70–99)
HCT VFR BLD CALC: 32.5 % — LOW (ref 34.5–45)
HGB BLD-MCNC: 10.7 G/DL — LOW (ref 11.5–15.5)
HYPOCHROMIA BLD QL: SLIGHT — SIGNIFICANT CHANGE UP
LDH SERPL L TO P-CCNC: 148 U/L — SIGNIFICANT CHANGE UP (ref 50–242)
LYMPHOCYTES # BLD AUTO: 0.1 K/UL — LOW (ref 1–3.3)
LYMPHOCYTES # BLD AUTO: 2 % — LOW (ref 13–44)
MAGNESIUM SERPL-MCNC: 2.1 MG/DL — SIGNIFICANT CHANGE UP (ref 1.6–2.6)
MCHC RBC-ENTMCNC: 33.1 GM/DL — SIGNIFICANT CHANGE UP (ref 32–36)
MCHC RBC-ENTMCNC: 34.8 PG — HIGH (ref 27–34)
MCV RBC AUTO: 105 FL — HIGH (ref 80–100)
MONOCYTES # BLD AUTO: 0.1 K/UL — SIGNIFICANT CHANGE UP (ref 0–0.9)
MONOCYTES NFR BLD AUTO: 1 % — LOW (ref 2–14)
NEUTROPHILS # BLD AUTO: 2.4 K/UL — SIGNIFICANT CHANGE UP (ref 1.8–7.4)
NEUTROPHILS NFR BLD AUTO: 94 % — HIGH (ref 43–77)
OVALOCYTES BLD QL SMEAR: SLIGHT — SIGNIFICANT CHANGE UP
PHOSPHATE SERPL-MCNC: 2.7 MG/DL — SIGNIFICANT CHANGE UP (ref 2.5–4.5)
PLAT MORPH BLD: NORMAL — SIGNIFICANT CHANGE UP
PLATELET # BLD AUTO: 60 K/UL — LOW (ref 150–400)
POIKILOCYTOSIS BLD QL AUTO: SLIGHT — SIGNIFICANT CHANGE UP
POLYCHROMASIA BLD QL SMEAR: SLIGHT — SIGNIFICANT CHANGE UP
POTASSIUM SERPL-MCNC: 3.9 MMOL/L — SIGNIFICANT CHANGE UP (ref 3.5–5.3)
POTASSIUM SERPL-SCNC: 3.9 MMOL/L — SIGNIFICANT CHANGE UP (ref 3.5–5.3)
PROT SERPL-MCNC: 5.5 G/DL — LOW (ref 6–8.3)
RBC # BLD: 3.09 M/UL — LOW (ref 3.8–5.2)
RBC # FLD: 12.8 % — SIGNIFICANT CHANGE UP (ref 10.3–14.5)
RBC BLD AUTO: ABNORMAL
SODIUM SERPL-SCNC: 143 MMOL/L — SIGNIFICANT CHANGE UP (ref 135–145)
WBC # BLD: 2.7 K/UL — LOW (ref 3.8–10.5)
WBC # FLD AUTO: 2.7 K/UL — LOW (ref 3.8–10.5)

## 2019-02-10 PROCEDURE — 99291 CRITICAL CARE FIRST HOUR: CPT

## 2019-02-10 RX ADMIN — Medication 10 MILLILITER(S): at 09:26

## 2019-02-10 RX ADMIN — Medication 1 TABLET(S): at 11:06

## 2019-02-10 RX ADMIN — Medication 500 MILLIGRAM(S): at 00:35

## 2019-02-10 RX ADMIN — URSODIOL 300 MILLIGRAM(S): 250 TABLET, FILM COATED ORAL at 07:40

## 2019-02-10 RX ADMIN — LEVETIRACETAM 500 MILLIGRAM(S): 250 TABLET, FILM COATED ORAL at 17:00

## 2019-02-10 RX ADMIN — Medication 10 MILLILITER(S): at 21:42

## 2019-02-10 RX ADMIN — LOSARTAN POTASSIUM 100 MILLIGRAM(S): 100 TABLET, FILM COATED ORAL at 05:31

## 2019-02-10 RX ADMIN — Medication 1 LOZENGE: at 15:43

## 2019-02-10 RX ADMIN — Medication 100 MILLIGRAM(S): at 05:31

## 2019-02-10 RX ADMIN — Medication 5 MILLILITER(S): at 15:43

## 2019-02-10 RX ADMIN — SENNA PLUS 1 TABLET(S): 8.6 TABLET ORAL at 21:44

## 2019-02-10 RX ADMIN — ONDANSETRON 8 MILLIGRAM(S): 8 TABLET, FILM COATED ORAL at 02:31

## 2019-02-10 RX ADMIN — Medication 10 MILLILITER(S): at 15:44

## 2019-02-10 RX ADMIN — OXYCODONE HYDROCHLORIDE 5 MILLIGRAM(S): 5 TABLET ORAL at 15:50

## 2019-02-10 RX ADMIN — Medication 5 MILLILITER(S): at 07:40

## 2019-02-10 RX ADMIN — Medication 10 MILLILITER(S): at 00:35

## 2019-02-10 RX ADMIN — LEVETIRACETAM 500 MILLIGRAM(S): 250 TABLET, FILM COATED ORAL at 05:31

## 2019-02-10 RX ADMIN — Medication 10 MILLILITER(S): at 11:07

## 2019-02-10 RX ADMIN — FLUDARABINE PHOSPHATE 204.08 MILLIGRAM(S): 25 INJECTION, SOLUTION INTRAVENOUS at 16:46

## 2019-02-10 RX ADMIN — Medication 1 TABLET(S): at 17:00

## 2019-02-10 RX ADMIN — Medication 5 MILLILITER(S): at 11:07

## 2019-02-10 RX ADMIN — Medication 100 MILLIGRAM(S): at 11:05

## 2019-02-10 RX ADMIN — ONDANSETRON 8 MILLIGRAM(S): 8 TABLET, FILM COATED ORAL at 09:26

## 2019-02-10 RX ADMIN — Medication 1 MILLIGRAM(S): at 13:28

## 2019-02-10 RX ADMIN — Medication 100 MILLIGRAM(S): at 21:44

## 2019-02-10 RX ADMIN — Medication 5 MILLILITER(S): at 00:35

## 2019-02-10 RX ADMIN — OXYCODONE HYDROCHLORIDE 5 MILLIGRAM(S): 5 TABLET ORAL at 22:15

## 2019-02-10 RX ADMIN — ONDANSETRON 8 MILLIGRAM(S): 8 TABLET, FILM COATED ORAL at 17:00

## 2019-02-10 RX ADMIN — SODIUM CHLORIDE 20 MILLILITER(S): 9 INJECTION INTRAMUSCULAR; INTRAVENOUS; SUBCUTANEOUS at 05:30

## 2019-02-10 RX ADMIN — URSODIOL 300 MILLIGRAM(S): 250 TABLET, FILM COATED ORAL at 17:00

## 2019-02-10 RX ADMIN — Medication 1 LOZENGE: at 11:07

## 2019-02-10 RX ADMIN — APREPITANT 80 MILLIGRAM(S): 80 CAPSULE ORAL at 15:44

## 2019-02-10 RX ADMIN — Medication 1 MILLIGRAM(S): at 11:05

## 2019-02-10 RX ADMIN — OXYCODONE HYDROCHLORIDE 5 MILLIGRAM(S): 5 TABLET ORAL at 21:40

## 2019-02-10 RX ADMIN — FLUCONAZOLE 400 MILLIGRAM(S): 150 TABLET ORAL at 11:05

## 2019-02-10 RX ADMIN — Medication 1 TABLET(S): at 05:33

## 2019-02-10 RX ADMIN — SODIUM CHLORIDE 20 MILLILITER(S): 9 INJECTION INTRAMUSCULAR; INTRAVENOUS; SUBCUTANEOUS at 21:45

## 2019-02-10 RX ADMIN — HEPARIN SODIUM 4.64 UNIT(S)/HR: 5000 INJECTION INTRAVENOUS; SUBCUTANEOUS at 17:06

## 2019-02-10 RX ADMIN — Medication 5 MILLILITER(S): at 21:41

## 2019-02-10 RX ADMIN — Medication 1 LOZENGE: at 00:35

## 2019-02-10 RX ADMIN — Medication 1 LOZENGE: at 07:40

## 2019-02-10 RX ADMIN — PANTOPRAZOLE SODIUM 40 MILLIGRAM(S): 20 TABLET, DELAYED RELEASE ORAL at 05:31

## 2019-02-10 RX ADMIN — Medication 1 LOZENGE: at 21:42

## 2019-02-10 RX ADMIN — Medication 500 MILLIGRAM(S): at 11:05

## 2019-02-10 RX ADMIN — OXYCODONE HYDROCHLORIDE 5 MILLIGRAM(S): 5 TABLET ORAL at 16:30

## 2019-02-10 NOTE — PROGRESS NOTE ADULT - ATTENDING COMMENTS
Patient is a 58 year old female with PMH [place] chromosome positive ALL diagnosed in 2016 s/p R HyperCVAD X 4 cycles, IT MTX X 2, s/p autologous stem cell transplantation with melphalan conditioning (12/16/16),  who presented in October 2018 with subdural hemorrhage and relapsed disease confirmed by peripheral blood flow cytometry treated with Inotuzumab X 2 cycles.  Bone marrow biopsy on 1/23/19 showed remission with FISH and PCR for BCR-ABL translocation negative on the BM specimen but peripheral blood testing on 1/31 showed .004% BCR-ABL transcript with negative FISH suggesting MRD positivity.  Patient was on Ponatinib, DC 1/31/19. LFT's improved off drug. CR2    Patient was admitted for  haploidentical allogeneic stem cell transplantation with fludarabine/cytoxan/TBI conditioning. Day -4    Patient complaint of flu-like symptoms on admission, CxR was negative for pneumonia. Viral studies negative.     Continue Acyclovir, Fluconazole. Bactrim through day -1.     History of SDH, continue Keppra. CT scan baseline 2/7/19 negative for SDH.     History of HTN continue Losartan.     VOD prophylaxis as per protocol. Patient is a 58 year old female with PMH [place] chromosome positive ALL diagnosed in 2016 s/p R HyperCVAD X 4 cycles, IT MTX X 2, s/p autologous stem cell transplantation with melphalan conditioning (12/16/16),  who presented in October 2018 with subdural hemorrhage and relapsed disease confirmed by peripheral blood flow cytometry treated with Inotuzumab X 2 cycles.  Bone marrow biopsy on 1/23/19 showed remission with FISH and PCR for BCR-ABL translocation negative on the BM specimen but peripheral blood testing on 1/31 showed .004% BCR-ABL transcript with negative FISH suggesting MRD positivity.  Patient was on Ponatinib, DC 1/31/19. LFT's improved off drug. CR2    Patient was admitted for  haploidentical allogeneic stem cell transplantation with fludarabine/cytoxan/TBI conditioning. Day -3    Patient complaint of flu-like symptoms on admission, CxR was negative for pneumonia. Viral studies negative.     Continue Acyclovir, Fluconazole. Bactrim through day -1.     History of SDH, continue Keppra. CT scan baseline 2/7/19 negative for SDH.     History of HTN continue Losartan.     VOD prophylaxis as per protocol.

## 2019-02-10 NOTE — PROGRESS NOTE ADULT - SUBJECTIVE AND OBJECTIVE BOX
HPC Transplant Team                                                      Critical / Counseling Time Provided: 30 minutes                                                                                                                                                        Chief Complaint:     S: Patient seen and examined with HPC Transplant Team:   Denies mouth / tongue / throat pain, dyspnea, cough, nausea, vomiting, diarrhea, abdominal pain     O: Vitals:   Vital Signs Last 24 Hrs  T(C): 36.6 (10 Feb 2019 06:00), Max: 36.7 (2019 17:40)  T(F): 97.9 (10 Feb 2019 06:00), Max: 98 (2019 17:40)  HR: 68 (10 Feb 2019 06:00) (68 - 84)  BP: 119/76 (10 Feb 2019 06:00) (101/67 - 131/84)  BP(mean): --  RR: 18 (10 Feb 2019 06:00) (17 - 18)  SpO2: 98% (10 Feb 2019 06:00) (98% - 100%)    Admit weight:   Daily     Daily Weight in k.1 (2019 09:57)    Intake / Output:    @ 07:01  -  10 @ 07:00  --------------------------------------------------------  IN: 3128.1 mL / OUT: 3650 mL / NET: -521.9 mL          PE:   Oropharynx:   Oral Mucositis:                                                        Grade:   CVS:   Lungs:   Abdomen:  Extremities:   Gastric Mucositis:                                                  Grade:   Intestinal Mucositis:                                              Grade:   Skin:   TLC:   Neuro:   Pain:     Labs:   CBC Full  -  ( 2019 06:51 )  WBC Count : 4.0 K/uL  Hemoglobin : 11.8 g/dL  Hematocrit : 35.5 %  Platelet Count - Automated : 72 K/uL  Mean Cell Volume : 104.0 fl  Mean Cell Hemoglobin : 34.6 pg  Mean Cell Hemoglobin Concentration : 33.3 gm/dL  Auto Neutrophil # : 2.9 K/uL  Auto Lymphocyte # : 0.6 K/uL  Auto Monocyte # : 0.3 K/uL  Auto Eosinophil # : 0.2 K/uL  Auto Basophil # : 0.0 K/uL  Auto Neutrophil % : 73.6 %  Auto Lymphocyte % : 15.2 %  Auto Monocyte % : 6.7 %  Auto Eosinophil % : 4.3 %  Auto Basophil % : 0.3 %                          11.8   4.0   )-----------( 72       ( 2019 06:51 )             35.5         142  |  103  |  9   ----------------------------<  93  3.6   |  24  |  0.95    Ca    8.8      2019 06:50  Phos  3.6       Mg     1.9         TPro  6.0  /  Alb  3.9  /  TBili  0.2  /  DBili  x   /  AST  31  /  ALT  51<H>  /  AlkPhos  238<H>        LIVER FUNCTIONS - ( 2019 06:50 )  Alb: 3.9 g/dL / Pro: 6.0 g/dL / ALK PHOS: 238 U/L / ALT: 51 U/L / AST: 31 U/L / GGT: x                   Karnofsky / Lansky Scale:   GVHD:   Skin:   Liver:   Gut:   Overall Grade:       Cultures:         Radiology:       Meds:   Antimicrobials:   ciprofloxacin     Tablet 500 milliGRAM(s) Oral every 12 hours  clotrimazole Lozenge 1 Lozenge Oral five times a day  fluconAZOLE   Tablet 400 milliGRAM(s) Oral daily  trimethoprim  160 mG/sulfamethoxazole 800 mG 1 Tablet(s) Oral every 12 hours      Heme / Onc:   fludarabine IVPB (eMAR) 51 milliGRAM(s) IV Intermittent every 24 hours  heparin  Infusion 464 Unit(s)/Hr IV Continuous <Continuous>      GI:  docusate sodium 100 milliGRAM(s) Oral three times a day PRN  pantoprazole    Tablet 40 milliGRAM(s) Oral before breakfast  senna 1 Tablet(s) Oral at bedtime PRN  sodium bicarbonate Mouth Rinse 10 milliLiter(s) Swish and Spit five times a day  ursodiol Capsule 300 milliGRAM(s) Oral two times a day with meals      Cardiovascular:   losartan 100 milliGRAM(s) Oral daily      Immunologic:       Other medications:   acetaminophen   Tablet .. 650 milliGRAM(s) Oral every 6 hours  aprepitant 80 milliGRAM(s) Oral every 24 hours  Biotene Dry Mouth Oral Rinse 5 milliLiter(s) Swish and Spit five times a day  folic acid 1 milliGRAM(s) Oral daily  levETIRAcetam 500 milliGRAM(s) Oral two times a day  multivitamin 1 Tablet(s) Oral daily  ondansetron Injectable 8 milliGRAM(s) IV Push every 8 hours  sodium chloride 0.45%. 1000 milliLiter(s) IV Continuous <Continuous>  sodium chloride 0.9%. 1000 milliLiter(s) IV Continuous <Continuous>      PRN:   acetaminophen   Tablet .. 650 milliGRAM(s) Oral every 6 hours PRN  benzonatate 100 milliGRAM(s) Oral every 8 hours PRN  diphenhydrAMINE   Injectable 25 milliGRAM(s) IV Push every 4 hours PRN  docusate sodium 100 milliGRAM(s) Oral three times a day PRN  metoclopramide Injectable 10 milliGRAM(s) IV Push every 6 hours PRN  oxyCODONE    IR 5 milliGRAM(s) Oral every 6 hours PRN  senna 1 Tablet(s) Oral at bedtime PRN      A/P:   ___ year old ___  with a history of ______________________  Pre / Status Post :  Autologous / Allogeneic PBSCT / BMT day ____________    1. Infectious Disease:   Fluconazole, Acyclovir     2. VOD Prophylaxis: Actigall, Glutamine, Heparin (dosed at 100 units / kg / day)     3. GI Prophylaxis:  Protonix    4. Mouthcare - NS / NaHCO3 rinses, Mycelex, Caphosol, skin care     5. GVHD prophylaxis     6. Transfuse & replete electrolytes prn     7. IV hydration, daily weights, strict I&O, prn diuresis     8. PO intake as tolerated, nutrition follow up as needed, MVI, folic acid     9. Antiemetics, anti-diarrhea medications:   Reglan, Ativan    10. OOB as tolerated, physical therapy consult if needed     11. Monitor coags / fibrinogen 2x week, vitamin K as needed     12. Monitor closely for clinical changes, monitor for fevers     13. Emotional support provided, plan of care discussed with patient and family, questions addressed     14. Patient education done regarding chemotherapy prep, plan of care, restrictions and discharge planning     15. Continue regular social work input     I have written the above note for Dr. Bentley who performed service with me in the room.   Cedric JOHNS ([phone])    I have seen and examined patient with NP, I agree with above note as scribed. HPC Transplant Team                                                      Critical / Counseling Time Provided: 30 minutes                                                                                                                                                        Chief Complaint: admitted for stem cell transplant    S: Patient seen and examined with HPC Transplant Team:   + intermittent nausea  +fatigue  Denies mouth / tongue / throat pain, dyspnea, cough, diarrhea, abdominal pain     O: Vitals:   Vital Signs Last 24 Hrs  T(C): 36.6 (10 Feb 2019 06:00), Max: 36.7 (09 Feb 2019 17:40)  T(F): 97.9 (10 Feb 2019 06:00), Max: 98 (09 Feb 2019 17:40)  HR: 68 (10 Feb 2019 06:00) (68 - 84)  BP: 119/76 (10 Feb 2019 06:00) (101/67 - 131/84)  BP(mean): --  RR: 18 (10 Feb 2019 06:00) (17 - 18)  SpO2: 98% (10 Feb 2019 06:00) (98% - 100%)    Admit weight: 111.4kg  Daily weight: 111.8kg      Intake / Output:   02-09 @ 07:01  -  02-10 @ 07:00  --------------------------------------------------------  IN: 3128.1 mL / OUT: 3650 mL / NET: -521.9 mL  PE:   Oropharynx: no erythema or ulcers  Oral Mucositis: n/a                                                       stGstrstastdstest:st st1st CVS: S1S2, RRR  Lungs: clear bilaterally  Abdomen: NT/ND, normoactive BS x 4Q  Extremities: no edema  Gastric Mucositis: n/a                                                 stGstrstastdstest:st st1st Intestinal Mucositis: n/a                                             stGstrstastdstest:st st1st LABS:                         10.7   2.7   )-----------( 60       ( 10 Feb 2019 07:06 )             32.5         Mean Cell Volume : 105.0 fl  Mean Cell Hemoglobin : 34.8 pg  Mean Cell Hemoglobin Concentration : 33.1 gm/dL  Auto Neutrophil # : 2.4 K/uL  Auto Lymphocyte # : 0.1 K/uL  Auto Monocyte # : 0.1 K/uL  Auto Eosinophil # : 0.1 K/uL  Auto Basophil # : 0.0 K/uL  Auto Neutrophil % : 94.0 %  Auto Lymphocyte % : 2.0 %  Auto Monocyte % : 1.0 %  Auto Eosinophil % : 3.0 %  Auto Basophil % : 0.0 %    02-10    143  |  110<H>  |  8   ----------------------------<  94  3.9   |  22  |  0.84    Ca    9.1      10 Feb 2019 07:06  Phos  2.7     02-10  Mg     2.1     02-10    TPro  5.5<L>  /  Alb  3.3  /  TBili  0.2  /  DBili  x   /  AST  27  /  ALT  43  /  AlkPhos  224<H>  02-10      Mg 2.1  Phos 2.7    Uric Acid --    Meds:   Antimicrobials:   ciprofloxacin     Tablet 500 milliGRAM(s) Oral every 12 hours  clotrimazole Lozenge 1 Lozenge Oral five times a day  fluconAZOLE   Tablet 400 milliGRAM(s) Oral daily  trimethoprim  160 mG/sulfamethoxazole 800 mG 1 Tablet(s) Oral every 12 hours      Heme / Onc:   fludarabine IVPB (eMAR) 51 milliGRAM(s) IV Intermittent every 24 hours  heparin  Infusion 464 Unit(s)/Hr IV Continuous <Continuous>      GI:  docusate sodium 100 milliGRAM(s) Oral three times a day PRN  pantoprazole    Tablet 40 milliGRAM(s) Oral before breakfast  senna 1 Tablet(s) Oral at bedtime PRN  sodium bicarbonate Mouth Rinse 10 milliLiter(s) Swish and Spit five times a day  ursodiol Capsule 300 milliGRAM(s) Oral two times a day with meals      Cardiovascular:   losartan 100 milliGRAM(s) Oral daily    Other medications:   acetaminophen   Tablet .. 650 milliGRAM(s) Oral every 6 hours  aprepitant 80 milliGRAM(s) Oral every 24 hours  Biotene Dry Mouth Oral Rinse 5 milliLiter(s) Swish and Spit five times a day  folic acid 1 milliGRAM(s) Oral daily  levETIRAcetam 500 milliGRAM(s) Oral two times a day  multivitamin 1 Tablet(s) Oral daily  ondansetron Injectable 8 milliGRAM(s) IV Push every 8 hours  sodium chloride 0.45%. 1000 milliLiter(s) IV Continuous <Continuous>  sodium chloride 0.9%. 1000 milliLiter(s) IV Continuous <Continuous>    PRN:   acetaminophen   Tablet .. 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  Pre Allogeneic PBSCT / BMT day - 3  Fludarabine 4/5  H/o subdural hematomas on Keppra in past, lost to follow-up (Neurologist is Dr. Headley).  Neurosurgery consult on 2/7, no need for Keppra because has been seizure-free.  Neurosurgery recommending to obtain baseline CT Head non-con, no acute intracranial abnormality, possible sinusitus  Cough x 3 weeks, CXR clear, start Tessalon 100 mg PRN, RVP (-), start Cipro 500 mg BID  Chronic back pain- oxycodone 5 mg IR PRN    1. Infectious Disease:   clotrimazole Lozenge 1 Lozenge Oral five times a day  fluconAZOLE   Tablet 400 milliGRAM(s) Oral daily  trimethoprim  160 mG/sulfamethoxazole 800 mG 1 Tablet(s) Oral every 12 hours    2. VOD Prophylaxis: Actigall, Glutamine, Heparin (dosed at 100 units / kg / day)     3. GI Prophylaxis:  Protonix    4. Mouthcare - NS / NaHCO3 rinses, Mycelex, Caphosol; Skin care     5. GVHD prophylaxis: MMF and [place] to start on Day 10     6. Transfuse & replete electrolytes prn     7. IV hydration, daily weights, strict I&O, prn diuresis   Lasix 40 mg IVP BID today    8. PO intake as tolerated, nutrition follow up as needed, MVI, folic acid     9. Antiemetics, anti-diarrhea medications:   aprepitant 80 milliGRAM(s) Oral every 24 hours  ondansetron Injectable 8 milliGRAM(s) IV Push every 8 hours  metoclopramide Injectable 10 milliGRAM(s) IV Push every 6 hours PRN    10. OOB as tolerated, physical therapy consult if needed     11. Monitor coags / fibrinogen 2x week, vitamin K as needed     12. Monitor closely for clinical changes, monitor for fevers     13. Emotional support provided, plan of care discussed and questions addressed     14. Patient education done regarding chemotherapy prep, plan of care, restrictions and discharge planning     15. Continue regular social work input      I have written the above note for Dr. Bentley who performed service with me in the room.   Cedric Hernadez  NP-C ([phone])    I have seen and examined patient with NP, I agree with above note as scribed.

## 2019-02-10 NOTE — ADVANCED PRACTICE NURSE CONSULT - ASSESSMENT
Patient A&Ox4. VSS. Patient has a right TLC patent with positive blood return and flushes easily. Dressing dry and intact with no swelling or redness noted at site. Chemotherapy teaching completed; patient verbalized understanding. Drug verification done with another nurse. Patient started on Fludarabine 30mg/m2/day =51mg IVSS over 30mins at 1646.

## 2019-02-11 LAB
ALBUMIN SERPL ELPH-MCNC: 3.4 G/DL — SIGNIFICANT CHANGE UP (ref 3.3–5)
ALP SERPL-CCNC: 225 U/L — HIGH (ref 40–120)
ALT FLD-CCNC: 40 U/L — SIGNIFICANT CHANGE UP (ref 10–45)
ANION GAP SERPL CALC-SCNC: 9 MMOL/L — SIGNIFICANT CHANGE UP (ref 5–17)
APTT BLD: 54.6 SEC — HIGH (ref 27.5–36.3)
AST SERPL-CCNC: 27 U/L — SIGNIFICANT CHANGE UP (ref 10–40)
BASOPHILS # BLD AUTO: 0 K/UL — SIGNIFICANT CHANGE UP (ref 0–0.2)
BASOPHILS NFR BLD AUTO: 0 % — SIGNIFICANT CHANGE UP (ref 0–2)
BILIRUB DIRECT SERPL-MCNC: <0.1 MG/DL — SIGNIFICANT CHANGE UP (ref 0–0.2)
BILIRUB INDIRECT FLD-MCNC: >0.1 MG/DL — LOW (ref 0.2–1)
BILIRUB SERPL-MCNC: 0.2 MG/DL — SIGNIFICANT CHANGE UP (ref 0.2–1.2)
BUN SERPL-MCNC: 8 MG/DL — SIGNIFICANT CHANGE UP (ref 7–23)
CALCIUM SERPL-MCNC: 9.5 MG/DL — SIGNIFICANT CHANGE UP (ref 8.4–10.5)
CHLORIDE SERPL-SCNC: 109 MMOL/L — HIGH (ref 96–108)
CO2 SERPL-SCNC: 23 MMOL/L — SIGNIFICANT CHANGE UP (ref 22–31)
CREAT SERPL-MCNC: 0.78 MG/DL — SIGNIFICANT CHANGE UP (ref 0.5–1.3)
EOSINOPHIL # BLD AUTO: 0.1 K/UL — SIGNIFICANT CHANGE UP (ref 0–0.5)
EOSINOPHIL NFR BLD AUTO: 4.8 % — SIGNIFICANT CHANGE UP (ref 0–6)
FIBRINOGEN PPP-MCNC: 598 MG/DL — HIGH (ref 350–510)
GLUCOSE SERPL-MCNC: 95 MG/DL — SIGNIFICANT CHANGE UP (ref 70–99)
HCT VFR BLD CALC: 32.5 % — LOW (ref 34.5–45)
HGB BLD-MCNC: 11.1 G/DL — LOW (ref 11.5–15.5)
INR BLD: 0.89 RATIO — SIGNIFICANT CHANGE UP (ref 0.88–1.16)
LDH SERPL L TO P-CCNC: 145 U/L — SIGNIFICANT CHANGE UP (ref 50–242)
LYMPHOCYTES # BLD AUTO: 0.1 K/UL — LOW (ref 1–3.3)
LYMPHOCYTES # BLD AUTO: 4.3 % — LOW (ref 13–44)
MAGNESIUM SERPL-MCNC: 2.2 MG/DL — SIGNIFICANT CHANGE UP (ref 1.6–2.6)
MCHC RBC-ENTMCNC: 34.1 GM/DL — SIGNIFICANT CHANGE UP (ref 32–36)
MCHC RBC-ENTMCNC: 35.9 PG — HIGH (ref 27–34)
MCV RBC AUTO: 105 FL — HIGH (ref 80–100)
MONOCYTES # BLD AUTO: 0 K/UL — SIGNIFICANT CHANGE UP (ref 0–0.9)
MONOCYTES NFR BLD AUTO: 1 % — LOW (ref 2–14)
NEUTROPHILS # BLD AUTO: 2 K/UL — SIGNIFICANT CHANGE UP (ref 1.8–7.4)
NEUTROPHILS NFR BLD AUTO: 90 % — HIGH (ref 43–77)
PHOSPHATE SERPL-MCNC: 2.6 MG/DL — SIGNIFICANT CHANGE UP (ref 2.5–4.5)
PLAT MORPH BLD: NORMAL — SIGNIFICANT CHANGE UP
PLATELET # BLD AUTO: 51 K/UL — LOW (ref 150–400)
POTASSIUM SERPL-MCNC: 4.2 MMOL/L — SIGNIFICANT CHANGE UP (ref 3.5–5.3)
POTASSIUM SERPL-SCNC: 4.2 MMOL/L — SIGNIFICANT CHANGE UP (ref 3.5–5.3)
PROT SERPL-MCNC: 5.7 G/DL — LOW (ref 6–8.3)
PROTHROM AB SERPL-ACNC: 10.2 SEC — SIGNIFICANT CHANGE UP (ref 10–12.9)
RBC # BLD: 3.08 M/UL — LOW (ref 3.8–5.2)
RBC # FLD: 12.7 % — SIGNIFICANT CHANGE UP (ref 10.3–14.5)
RBC BLD AUTO: SIGNIFICANT CHANGE UP
SODIUM SERPL-SCNC: 141 MMOL/L — SIGNIFICANT CHANGE UP (ref 135–145)
WBC # BLD: 2.3 K/UL — LOW (ref 3.8–10.5)
WBC # FLD AUTO: 2.3 K/UL — LOW (ref 3.8–10.5)

## 2019-02-11 PROCEDURE — 99291 CRITICAL CARE FIRST HOUR: CPT

## 2019-02-11 RX ORDER — APREPITANT 80 MG/1
80 CAPSULE ORAL DAILY
Qty: 0 | Refills: 0 | Status: DISCONTINUED | OUTPATIENT
Start: 2019-02-11 | End: 2019-02-12

## 2019-02-11 RX ORDER — OXYCODONE HYDROCHLORIDE 5 MG/1
5 TABLET ORAL EVERY 6 HOURS
Refills: 0 | Status: DISCONTINUED | OUTPATIENT
Start: 2019-02-11 | End: 2019-02-18

## 2019-02-11 RX ORDER — POLYETHYLENE GLYCOL 3350 17 G/17G
17 POWDER, FOR SOLUTION ORAL DAILY
Qty: 0 | Refills: 0 | Status: DISCONTINUED | OUTPATIENT
Start: 2019-02-11 | End: 2019-02-12

## 2019-02-11 RX ORDER — METOCLOPRAMIDE HCL 10 MG
10 TABLET ORAL EVERY 6 HOURS
Qty: 0 | Refills: 0 | Status: DISCONTINUED | OUTPATIENT
Start: 2019-02-11 | End: 2019-02-20

## 2019-02-11 RX ADMIN — Medication 5 MILLILITER(S): at 09:39

## 2019-02-11 RX ADMIN — Medication 10 MILLIGRAM(S): at 12:47

## 2019-02-11 RX ADMIN — OXYCODONE HYDROCHLORIDE 5 MILLIGRAM(S): 5 TABLET ORAL at 05:00

## 2019-02-11 RX ADMIN — OXYCODONE HYDROCHLORIDE 5 MILLIGRAM(S): 5 TABLET ORAL at 22:25

## 2019-02-11 RX ADMIN — Medication 5 MILLILITER(S): at 23:04

## 2019-02-11 RX ADMIN — Medication 1 TABLET(S): at 05:33

## 2019-02-11 RX ADMIN — ONDANSETRON 8 MILLIGRAM(S): 8 TABLET, FILM COATED ORAL at 09:33

## 2019-02-11 RX ADMIN — LOSARTAN POTASSIUM 100 MILLIGRAM(S): 100 TABLET, FILM COATED ORAL at 05:34

## 2019-02-11 RX ADMIN — Medication 500 MILLIGRAM(S): at 01:12

## 2019-02-11 RX ADMIN — Medication 100 MILLIGRAM(S): at 21:53

## 2019-02-11 RX ADMIN — SENNA PLUS 1 TABLET(S): 8.6 TABLET ORAL at 17:12

## 2019-02-11 RX ADMIN — PANTOPRAZOLE SODIUM 40 MILLIGRAM(S): 20 TABLET, DELAYED RELEASE ORAL at 05:38

## 2019-02-11 RX ADMIN — Medication 1 LOZENGE: at 01:11

## 2019-02-11 RX ADMIN — LEVETIRACETAM 500 MILLIGRAM(S): 250 TABLET, FILM COATED ORAL at 05:33

## 2019-02-11 RX ADMIN — Medication 10 MILLIGRAM(S): at 17:05

## 2019-02-11 RX ADMIN — Medication 650 MILLIGRAM(S): at 11:03

## 2019-02-11 RX ADMIN — Medication 10 MILLILITER(S): at 20:07

## 2019-02-11 RX ADMIN — Medication 1 LOZENGE: at 20:07

## 2019-02-11 RX ADMIN — Medication 10 MILLILITER(S): at 11:04

## 2019-02-11 RX ADMIN — Medication 500 MILLIGRAM(S): at 23:04

## 2019-02-11 RX ADMIN — SODIUM CHLORIDE 20 MILLILITER(S): 9 INJECTION INTRAMUSCULAR; INTRAVENOUS; SUBCUTANEOUS at 21:52

## 2019-02-11 RX ADMIN — Medication 10 MILLIGRAM(S): at 23:06

## 2019-02-11 RX ADMIN — IMMUNE GLOBULIN (HUMAN) 33.33 GRAM(S): 10 INJECTION INTRAVENOUS; SUBCUTANEOUS at 10:58

## 2019-02-11 RX ADMIN — Medication 5 MILLILITER(S): at 11:04

## 2019-02-11 RX ADMIN — Medication 25 MILLIGRAM(S): at 10:57

## 2019-02-11 RX ADMIN — URSODIOL 300 MILLIGRAM(S): 250 TABLET, FILM COATED ORAL at 09:29

## 2019-02-11 RX ADMIN — Medication 500 MILLIGRAM(S): at 11:04

## 2019-02-11 RX ADMIN — Medication 1 LOZENGE: at 11:04

## 2019-02-11 RX ADMIN — Medication 100 MILLIGRAM(S): at 05:33

## 2019-02-11 RX ADMIN — Medication 1 LOZENGE: at 23:04

## 2019-02-11 RX ADMIN — Medication 100 MILLIGRAM(S): at 17:12

## 2019-02-11 RX ADMIN — Medication 5 MILLILITER(S): at 01:11

## 2019-02-11 RX ADMIN — Medication 25 MILLIGRAM(S): at 10:59

## 2019-02-11 RX ADMIN — Medication 650 MILLIGRAM(S): at 11:05

## 2019-02-11 RX ADMIN — Medication 10 MILLIGRAM(S): at 04:20

## 2019-02-11 RX ADMIN — ONDANSETRON 8 MILLIGRAM(S): 8 TABLET, FILM COATED ORAL at 01:12

## 2019-02-11 RX ADMIN — FLUDARABINE PHOSPHATE 204.08 MILLIGRAM(S): 25 INJECTION, SOLUTION INTRAVENOUS at 16:55

## 2019-02-11 RX ADMIN — Medication 1 LOZENGE: at 09:38

## 2019-02-11 RX ADMIN — POLYETHYLENE GLYCOL 3350 17 GRAM(S): 17 POWDER, FOR SOLUTION ORAL at 13:48

## 2019-02-11 RX ADMIN — SENNA PLUS 1 TABLET(S): 8.6 TABLET ORAL at 23:04

## 2019-02-11 RX ADMIN — Medication 1 TABLET(S): at 11:02

## 2019-02-11 RX ADMIN — FLUCONAZOLE 400 MILLIGRAM(S): 150 TABLET ORAL at 11:02

## 2019-02-11 RX ADMIN — Medication 1 MILLIGRAM(S): at 23:05

## 2019-02-11 RX ADMIN — HEPARIN SODIUM 4.64 UNIT(S)/HR: 5000 INJECTION INTRAVENOUS; SUBCUTANEOUS at 16:58

## 2019-02-11 RX ADMIN — Medication 10 MILLILITER(S): at 09:38

## 2019-02-11 RX ADMIN — OXYCODONE HYDROCHLORIDE 5 MILLIGRAM(S): 5 TABLET ORAL at 21:50

## 2019-02-11 RX ADMIN — SODIUM CHLORIDE 20 MILLILITER(S): 9 INJECTION INTRAMUSCULAR; INTRAVENOUS; SUBCUTANEOUS at 17:03

## 2019-02-11 RX ADMIN — APREPITANT 80 MILLIGRAM(S): 80 CAPSULE ORAL at 17:03

## 2019-02-11 RX ADMIN — OXYCODONE HYDROCHLORIDE 5 MILLIGRAM(S): 5 TABLET ORAL at 04:20

## 2019-02-11 RX ADMIN — Medication 1 MILLIGRAM(S): at 11:01

## 2019-02-11 RX ADMIN — SODIUM CHLORIDE 20 MILLILITER(S): 9 INJECTION INTRAMUSCULAR; INTRAVENOUS; SUBCUTANEOUS at 05:33

## 2019-02-11 RX ADMIN — Medication 10 MILLILITER(S): at 23:06

## 2019-02-11 RX ADMIN — Medication 5 MILLILITER(S): at 20:07

## 2019-02-11 RX ADMIN — Medication 10 MILLILITER(S): at 01:12

## 2019-02-11 NOTE — PROGRESS NOTE ADULT - SUBJECTIVE AND OBJECTIVE BOX
[facility]PC Transplant Team                                                      Critical / Counseling Time Provided: 30 minutes                                                                                                                                                        Chief Complaint:     S: Patient seen and examined with [facility] Transplant Team:   Denies mouth / tongue / throat pain, dyspnea, cough, nausea, vomiting, diarrhea, abdominal pain     O: Vitals:   Vital Signs Last 24 Hrs  T(C): 36.3 (2019 06:01), Max: 36.8 (10 Feb 2019 17:19)  T(F): 97.3 (2019 06:01), Max: 98.3 (10 Feb 2019 17:19)  HR: 78 (2019 06:01) (72 - 78)  BP: 146/90 (2019 06:01) (112/68 - 146/90)  BP(mean): --  RR: 18 (2019 06:01) (17 - 18)  SpO2: 99% (2019 06:01) (98% - 100%)    Admit weight: 111.4 kg  Daily     Daily Weight in k.8 (10 Feb 2019 10:06)    Intake / Output:   -10 @ 07:01  -  02-11 @ 07:00  --------------------------------------------------------  IN: 1587.7 mL / OUT: 3200 mL / NET: -1612.3 mL        PE:   Oropharynx: no erythema or ulcers  Oral Mucositis: n/a                                                       stGstrstastdstest:st st1st CVS: S1S2, RRR  Lungs: clear bilaterally  Abdomen: NT/ND, normoactive BS x 4Q  Extremities: no edema  Gastric Mucositis: n/a                                                 stGstrstastdstest:st st1st Intestinal Mucositis: n/a                                             stGstrstastdstest:st st1st Neuro:   Pain:       Labs:   CBC Full  -  ( 2019 07:02 )  WBC Count : 2.3 K/uL  Hemoglobin : 11.1 g/dL  Hematocrit : 32.5 %  Platelet Count - Automated : 51 K/uL  Mean Cell Volume : 105.0 fl  Mean Cell Hemoglobin : 35.9 pg  Mean Cell Hemoglobin Concentration : 34.1 gm/dL  Auto Neutrophil # : x  Auto Lymphocyte # : x  Auto Monocyte # : x  Auto Eosinophil # : x  Auto Basophil # : x  Auto Neutrophil % : x  Auto Lymphocyte % : x  Auto Monocyte % : x  Auto Eosinophil % : x  Auto Basophil % : x                          11.1   2.3   )-----------( 51       ( 2019 07:02 )             32.5     02-10    143  |  110<H>  |  8   ----------------------------<  94  3.9   |  22  |  0.84    Ca    9.1      10 Feb 2019 07:06  Phos  2.7     02-10  Mg     2.1     02-10    TPro  5.5<L>  /  Alb  3.3  /  TBili  0.2  /  DBili  x   /  AST  27  /  ALT  43  /  AlkPhos  224<H>  02-10      LIVER FUNCTIONS - ( 10 Feb 2019 07:06 )  Alb: 3.3 g/dL / Pro: 5.5 g/dL / ALK PHOS: 224 U/L / ALT: 43 U/L / AST: 27 U/L / GGT: x                   Cultures:         Radiology:       Meds:   Antimicrobials:   ciprofloxacin     Tablet 500 milliGRAM(s) Oral every 12 hours  clotrimazole Lozenge 1 Lozenge Oral five times a day  fluconAZOLE   Tablet 400 milliGRAM(s) Oral daily  trimethoprim  160 mG/sulfamethoxazole 800 mG 1 Tablet(s) Oral every 12 hours      Heme / Onc:   fludarabine IVPB (eMAR) 51 milliGRAM(s) IV Intermittent every 24 hours  heparin  Infusion 464 Unit(s)/Hr IV Continuous <Continuous>      GI:  docusate sodium 100 milliGRAM(s) Oral three times a day PRN  pantoprazole    Tablet 40 milliGRAM(s) Oral before breakfast  senna 1 Tablet(s) Oral at bedtime PRN  sodium bicarbonate Mouth Rinse 10 milliLiter(s) Swish and Spit five times a day  ursodiol Capsule 300 milliGRAM(s) Oral two times a day with meals      Cardiovascular:   losartan 100 milliGRAM(s) Oral daily      Immunologic:   immune   globulin 10% (GAMMAGARD) IVPB 20 Gram(s) IV Intermittent <User Schedule>      Other medications:   acetaminophen   Tablet .. 650 milliGRAM(s) Oral every 6 hours  acetaminophen   Tablet .. 650 milliGRAM(s) Oral <User Schedule>  aprepitant 80 milliGRAM(s) Oral every 24 hours  Biotene Dry Mouth Oral Rinse 5 milliLiter(s) Swish and Spit five times a day  diphenhydrAMINE   Injectable 25 milliGRAM(s) IV Push every 3 weeks  folic acid 1 milliGRAM(s) Oral daily  levETIRAcetam 500 milliGRAM(s) Oral two times a day  multivitamin 1 Tablet(s) Oral daily  ondansetron Injectable 8 milliGRAM(s) IV Push every 8 hours  sodium chloride 0.45%. 1000 milliLiter(s) IV Continuous <Continuous>  sodium chloride 0.9%. 1000 milliLiter(s) IV Continuous <Continuous>      PRN:   acetaminophen   Tablet .. 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 Headley).  Neurosurgery consult on , no need for Keppra because has been seizure-free.  Neurosurgery recommending to obtain baseline CT Head non-con, no acute intracranial abnormality, possible sinusitus  Cough x 3 weeks, CXR clear, start Tessalon 100 mg PRN, RVP (-), start Cipro 500 mg BID  Chronic back pain- oxycodone 5 mg IR PRN    1. Infectious Disease:   clotrimazole Lozenge 1 Lozenge Oral five times a day  fluconAZOLE   Tablet 400 milliGRAM(s) Oral daily  trimethoprim  160 mG/sulfamethoxazole 800 mG 1 Tablet(s) Oral every 12 hours    2. VOD Prophylaxis: Actigall, Glutamine, Heparin (dosed at 100 units / kg / day)     3. GI Prophylaxis:  Protonix    4. Mouthcare - NS / NaHCO3 rinses, Mycelex, Caphosol; Skin care     5. GVHD prophylaxis: MMF and Tacro to start on Day 10     6. Transfuse & replete electrolytes prn     7. IV hydration, daily weights, strict I&O, prn diuresis   Lasix 40 mg IVP BID today    8. PO intake as tolerated, nutrition follow up as needed, MVI, folic acid     9. Antiemetics, anti-diarrhea medications:   aprepitant 80 milliGRAM(s) Oral every 24 hours  ondansetron Injectable 8 milliGRAM(s) IV Push every 8 hours  metoclopramide Injectable 10 milliGRAM(s) IV Push every 6 hours PRN    10. OOB as tolerated, physical therapy consult if needed     11. Monitor coags / fibrinogen 2x week, vitamin K as needed     12. Monitor closely for clinical changes, monitor for fevers     13. Emotional support provided, plan of care discussed and questions addressed     14. Patient education done regarding chemotherapy prep, plan of care, restrictions and discharge planning     15. Continue regular social work input        I have written the above note for Dr. Logan who performed service with me in the room.   Janett Quezada NP-C ([phone])    I have seen and examined patient with NP, I agree with above note as scribed. [facility] Transplant Team                                                      Critical / Counseling Time Provided: 30 minutes                                                                                                                                                        Chief Complaint: admitted for stem cell transplant    S: Patient seen and examined with [facility] Transplant Team:       Denies mouth / tongue / throat pain, dyspnea, cough, nausea, vomiting, diarrhea, abdominal pain     O: Vitals:   Vital Signs Last 24 Hrs  T(C): 36.3 (2019 06:01), Max: 36.8 (10 Feb 2019 17:19)  T(F): 97.3 (2019 06:01), Max: 98.3 (10 Feb 2019 17:19)  HR: 78 (2019 06:01) (72 - 78)  BP: 146/90 (2019 06:01) (112/68 - 146/90)  RR: 18 (2019 06:01) (17 - 18)  SpO2: 99% (2019 06:01) (98% - 100%)    Admit weight: 111.4 kg  Daily     Daily Weight in k.8 (10 Feb 2019 10:06)    Intake / Output:   -10 @ 07:01  -  02-11 @ 07:00  --------------------------------------------------------  IN: 1587.7 mL / OUT: 3200 mL / NET: -1612.3 mL      PE:   Oropharynx: no erythema or ulcers  Oral Mucositis: n/a                                                       stGstrstastdstest:st st1st CVS: S1S2, RRR  Lungs: clear bilaterally  Abdomen: NT/ND, normoactive BS x 4Q  Extremities: no edema  Gastric Mucositis: n/a                                                 stGstrstastdstest:st st1st Intestinal Mucositis: n/a                                             stGstrstastdstest:st st1st Skin: no rashes  TLC: C/D/I  Neuro: nonfocal  Pain: none      Labs:   CBC Full  -  ( 2019 07:02 )  WBC Count : 2.3 K/uL  Hemoglobin : 11.1 g/dL  Hematocrit : 32.5 %  Platelet Count - Automated : 51 K/uL  Mean Cell Volume : 105.0 fl  Mean Cell Hemoglobin : 35.9 pg  Mean Cell Hemoglobin Concentration : 34.1 gm/dL  Auto Neutrophil # : x  Auto Lymphocyte # : x  Auto Monocyte # : x  Auto Eosinophil # : x  Auto Basophil # : x  Auto Neutrophil % : x  Auto Lymphocyte % : x  Auto Monocyte % : x  Auto Eosinophil % : x  Auto Basophil % : x                          11.1   2.3   )-----------( 51       ( 2019 07:02 )             32.5     02-10    143  |  110<H>  |  8   ----------------------------<  94  3.9   |  22  |  0.84    Ca    9.1      10 Feb 2019 07:06  Phos  2.7     02-10  Mg     2.1     02-10    TPro  5.5<L>  /  Alb  3.3  /  TBili  0.2  /  DBili  x   /  AST  27  /  ALT  43  /  AlkPhos  224<H>  02-10      LIVER FUNCTIONS - ( 10 Feb 2019 07:06 )  Alb: 3.3 g/dL / Pro: 5.5 g/dL / ALK PHOS: 224 U/L / ALT: 43 U/L / AST: 27 U/L / GGT: x             Cultures: no recent    Radiology:     from: CT Head No Cont (19 @ 08:53)       IMPRESSION:    No acute intracranial abnormality is noted. No subdural collections are   visualized. If the patient has new and persistent symptoms, consider   follow-up brain MRI with and without contrast, if there are no MRI or   contrast contraindications.    New moderate mucosal thickening involves the paranasal sinuses. Correlate   for possible sinusitis      Meds:   Antimicrobials:   ciprofloxacin     Tablet 500 milliGRAM(s) Oral every 12 hours  clotrimazole Lozenge 1 Lozenge Oral five times a day  fluconAZOLE   Tablet 400 milliGRAM(s) Oral daily  trimethoprim  160 mG/sulfamethoxazole 800 mG 1 Tablet(s) Oral every 12 hours      Heme / Onc:   fludarabine IVPB (eMAR) 51 milliGRAM(s) IV Intermittent every 24 hours  heparin  Infusion 464 Unit(s)/Hr IV Continuous <Continuous>      GI:  docusate sodium 100 milliGRAM(s) Oral three times a day PRN  pantoprazole    Tablet 40 milliGRAM(s) Oral before breakfast  senna 1 Tablet(s) Oral at bedtime PRN  sodium bicarbonate Mouth Rinse 10 milliLiter(s) Swish and Spit five times a day  ursodiol Capsule 300 milliGRAM(s) Oral two times a day with meals      Cardiovascular:   losartan 100 milliGRAM(s) Oral daily      Immunologic:   immune   globulin 10% (GAMMAGARD) IVPB 20 Gram(s) IV Intermittent <User Schedule>      Other medications:   acetaminophen   Tablet .. 650 milliGRAM(s) Oral every 6 hours  acetaminophen   Tablet .. 650 milliGRAM(s) Oral <User Schedule>  aprepitant 80 milliGRAM(s) Oral every 24 hours  Biotene Dry Mouth Oral Rinse 5 milliLiter(s) Swish and Spit five times a day  diphenhydrAMINE   Injectable 25 milliGRAM(s) IV Push every 3 weeks  folic acid 1 milliGRAM(s) Oral daily  levETIRAcetam 500 milliGRAM(s) Oral two times a day  multivitamin 1 Tablet(s) Oral daily  ondansetron Injectable 8 milliGRAM(s) IV Push every 8 hours  sodium chloride 0.45%. 1000 milliLiter(s) IV Continuous <Continuous>  sodium chloride 0.9%. 1000 milliLiter(s) IV Continuous <Continuous>      PRN:   acetaminophen   Tablet .. 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 Headley).  Neurosurgery consult on , no need for Keppra because has been seizure-free.  Neurosurgery recommending to obtain baseline CT Head non-con, no acute intracranial abnormality, possible sinusitus  Cough x 3 weeks, CXR clear, start Tessalon 100 mg PRN, RVP (-), start Cipro 500 mg BID  Chronic back pain- oxycodone 5 mg IR PRN    1. Infectious Disease:   clotrimazole Lozenge 1 Lozenge Oral five times a day  fluconAZOLE   Tablet 400 milliGRAM(s) Oral daily  trimethoprim  160 mG/sulfamethoxazole 800 mG 1 Tablet(s) Oral every 12 hours    2. VOD Prophylaxis: Actigall, Glutamine, Heparin (dosed at 100 units / kg / day)     3. GI Prophylaxis:  Protonix    4. Mouthcare - NS / NaHCO3 rinses, Mycelex, Caphosol; Skin care     5. GVHD prophylaxis: MMF and Tacro to start on Day 10     6. Transfuse & replete electrolytes prn     7. IV hydration, daily weights, strict I&O, prn diuresis   Lasix 40 mg IVP BID today    8. PO intake as tolerated, nutrition follow up as needed, MVI, folic acid     9. Antiemetics, anti-diarrhea medications:   aprepitant 80 milliGRAM(s) Oral every 24 hours  ondansetron Injectable 8 milliGRAM(s) IV Push every 8 hours  metoclopramide Injectable 10 milliGRAM(s) IV Push every 6 hours PRN    10. OOB as tolerated, physical therapy consult if needed     11. Monitor coags / fibrinogen 2x week, vitamin K as needed     12. Monitor closely for clinical changes, monitor for fevers     13. Emotional support provided, plan of care discussed and questions addressed     14. Patient education done regarding chemotherapy prep, plan of care, restrictions and discharge planning     15. Continue regular social work input      I have written the above note for Dr. Logan who performed service with me in the room.   Janett Quezada NP-C ([phone])    I have seen and examined patient with NP, I agree with above note as scribed. [facility] Transplant Team                                                      Critical / Counseling Time Provided: 30 minutes                                                                                                                                                        Chief Complaint: admitted for haplo-identical peripheral blood stem cell transplant from son () for treatment of Ph + ALL     S: Patient seen and examined with [facility] Transplant Team:       Denies mouth / tongue / throat pain, dyspnea, cough, nausea, vomiting, diarrhea, abdominal pain     O: Vitals:   Vital Signs Last 24 Hrs  T(C): 36.3 (2019 06:01), Max: 36.8 (10 Feb 2019 17:19)  T(F): 97.3 (2019 06:01), Max: 98.3 (10 Feb 2019 17:19)  HR: 78 (2019 06:01) (72 - 78)  BP: 146/90 (2019 06:01) (112/68 - 146/90)  RR: 18 (2019 06:01) (17 - 18)  SpO2: 99% (2019 06:01) (98% - 100%)    Admit weight: 111.4 kg  Daily Weight in k.8 (10 Feb 2019 10:06)  Today's weight:     Intake / Output:   02-10 @ 07:01  -  02-11 @ 07:00  --------------------------------------------------------  IN: 1587.7 mL / OUT: 3200 mL / NET: -1612.3 mL      PE:   Oropharynx: no erythema or ulcers  Oral Mucositis: n/a                                                       stGstrstastdstest:st st1st CVS: S1S2, RRR  Lungs: clear bilaterally  Abdomen: NT/ND, normoactive BS x 4Q  Extremities: no edema  Gastric Mucositis: n/a                                                 stGstrstastdstest:st st1st Intestinal Mucositis: n/a                                             stGstrstastdstest:st st1st Skin: no rashes  TLC: C/D/I  Neuro: nonfocal  Pain: none      Labs:   CBC Full  -  ( 2019 07:02 )  WBC Count : 2.3 K/uL  Hemoglobin : 11.1 g/dL  Hematocrit : 32.5 %  Platelet Count - Automated : 51 K/uL  Mean Cell Volume : 105.0 fl  Mean Cell Hemoglobin : 35.9 pg  Mean Cell Hemoglobin Concentration : 34.1 gm/dL  Auto Neutrophil # : x  Auto Lymphocyte # : x  Auto Monocyte # : x  Auto Eosinophil # : x  Auto Basophil # : x  Auto Neutrophil % : x  Auto Lymphocyte % : x  Auto Monocyte % : x  Auto Eosinophil % : x  Auto Basophil % : x                          11.1   2.3   )-----------( 51       ( 2019 07:02 )             32.5     02-10    143  |  110<H>  |  8   ----------------------------<  94  3.9   |  22  |  0.84    Ca    9.1      10 Feb 2019 07:06  Phos  2.7     02-10  Mg     2.1     02-10    TPro  5.5<L>  /  Alb  3.3  /  TBili  0.2  /  DBili  x   /  AST  27  /  ALT  43  /  AlkPhos  224<H>  02-10      LIVER FUNCTIONS - ( 10 Feb 2019 07:06 )  Alb: 3.3 g/dL / Pro: 5.5 g/dL / ALK PHOS: 224 U/L / ALT: 43 U/L / AST: 27 U/L / GGT: x             Cultures: no recent    Radiology:   from: CT Head No Cont (19 @ 08:53)   IMPRESSION:  No acute intracranial abnormality is noted. No subdural collections are   visualized. If the patient has new and persistent symptoms, consider   follow-up brain MRI with and without contrast, if there are no MRI or   contrast contraindications.  New moderate mucosal thickening involves the paranasal sinuses. Correlate   for possible sinusitis      Meds:   Antimicrobials:   ciprofloxacin     Tablet 500 milliGRAM(s) Oral every 12 hours  clotrimazole Lozenge 1 Lozenge Oral five times a day  fluconAZOLE   Tablet 400 milliGRAM(s) Oral daily  trimethoprim  160 mG/sulfamethoxazole 800 mG 1 Tablet(s) Oral every 12 hours      Heme / Onc:   fludarabine IVPB (eMAR) 51 milliGRAM(s) IV Intermittent every 24 hours  heparin  Infusion 464 Unit(s)/Hr IV Continuous <Continuous>      GI:  docusate sodium 100 milliGRAM(s) Oral three times a day PRN  pantoprazole    Tablet 40 milliGRAM(s) Oral before breakfast  senna 1 Tablet(s) Oral at bedtime PRN  sodium bicarbonate Mouth Rinse 10 milliLiter(s) Swish and Spit five times a day  ursodiol Capsule 300 milliGRAM(s) Oral two times a day with meals      Cardiovascular:   losartan 100 milliGRAM(s) Oral daily      Immunologic:   immune   globulin 10% (GAMMAGARD) IVPB 20 Gram(s) IV Intermittent <User Schedule>      Other medications:   acetaminophen   Tablet .. 650 milliGRAM(s) Oral every 6 hours  acetaminophen   Tablet .. 650 milliGRAM(s) Oral <User Schedule>  aprepitant 80 milliGRAM(s) Oral every 24 hours  Biotene Dry Mouth Oral Rinse 5 milliLiter(s) Swish and Spit five times a day  diphenhydrAMINE   Injectable 25 milliGRAM(s) IV Push every 3 weeks  folic acid 1 milliGRAM(s) Oral daily  levETIRAcetam 500 milliGRAM(s) Oral two times a day  multivitamin 1 Tablet(s) Oral daily  ondansetron Injectable 8 milliGRAM(s) IV Push every 8 hours  sodium chloride 0.45%. 1000 milliLiter(s) IV Continuous <Continuous>  sodium chloride 0.9%. 1000 milliLiter(s) IV Continuous <Continuous>      PRN:   acetaminophen   Tablet .. 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 Headley).  Neurosurgery consult on , no need for Keppra because has been seizure-free.  Baseline CT Head non-con, no acute intracranial abnormality, possible sinusitus  Cough x 3 weeks, CXR clear, start Tessalon 100 mg PRN, RVP (-), start Cipro 500 mg BID  Chronic back pain- oxycodone 5 mg IR PRN    1. Infectious Disease:   clotrimazole Lozenge 1 Lozenge Oral five times a day  fluconAZOLE   Tablet 400 milliGRAM(s) Oral daily  trimethoprim  160 mG/sulfamethoxazole 800 mG 1 Tablet(s) Oral every 12 hours    2. VOD Prophylaxis: Actigall, Glutamine, Heparin (dosed at 100 units / kg / day)     3. GI Prophylaxis:  Protonix    4. Mouthcare - NS / NaHCO3 rinses, Mycelex, Caphosol; Skin care     5. GVHD prophylaxis: MMF and Tacro to start on Day 10     6. Transfuse & replete electrolytes prn     7. IV hydration, daily weights, strict I&O, prn diuresis     8. PO intake as tolerated, nutrition follow up as needed, MVI, folic acid     9. Antiemetics, anti-diarrhea medications:   aprepitant 80 milliGRAM(s) Oral every 24 hours  ondansetron Injectable 8 milliGRAM(s) IV Push every 8 hours  metoclopramide Injectable 10 milliGRAM(s) IV Push every 6 hours PRN    10. OOB as tolerated, physical therapy consult if needed     11. Monitor coags / fibrinogen 2x week, vitamin K as needed     12. Monitor closely for clinical changes, monitor for fevers     13. Emotional support provided, plan of care discussed and questions addressed     14. Patient education done regarding plan of care, restrictions and discharge planning     15. Continue regular social work input      I have written the above note for Dr. Logan who performed service with me in the room.   Janett Barrow NP-C ([phone])    I have seen and examined patient with NP, I agree with above note as scribed. [facility] Transplant Team                                                      Critical / Counseling Time Provided: 30 minutes                                                                                                                                                        Chief Complaint: admitted for haplo-identical peripheral blood stem cell transplant from son () for treatment of Ph + ALL     S: Patient seen and examined with [facility] Transplant Team:   + nausea  + decreased appetite  + constipation      Denies mouth / tongue / throat pain, dyspnea, cough, vomiting, diarrhea, abdominal pain     O: Vitals:   Vital Signs Last 24 Hrs  T(C): 36.3 (2019 06:01), Max: 36.8 (10 Feb 2019 17:19)  T(F): 97.3 (2019 06:01), Max: 98.3 (10 Feb 2019 17:19)  HR: 78 (2019 06:01) (72 - 78)  BP: 146/90 (2019 06:01) (112/68 - 146/90)  RR: 18 (2019 06:01) (17 - 18)  SpO2: 99% (2019 06:01) (98% - 100%)    Admit weight: 111.4 kg  Daily Weight in k.8 (10 Feb 2019 10:06)  Today's weight:     Intake / Output:   02-10 @ 07:01  -  02-11 @ 07:00  --------------------------------------------------------  IN: 1587.7 mL / OUT: 3200 mL / NET: -1612.3 mL      PE:   Oropharynx: no erythema or ulcers  Oral Mucositis: n/a                                                       stGstrstastdstest:st st1st CVS: S1S2, RRR  Lungs: clear bilaterally  Abdomen: NT/ND, normoactive BS x 4Q  Extremities: no edema  Gastric Mucositis: n/a                                                 stGstrstastdstest:st st1st Intestinal Mucositis: n/a                                             stGstrstastdstest:st st1st Skin: no rashes  TLC: C/D/I  Neuro: nonfocal  Pain: none      Labs:   CBC Full  -  ( 2019 07:02 )  WBC Count : 2.3 K/uL  Hemoglobin : 11.1 g/dL  Hematocrit : 32.5 %  Platelet Count - Automated : 51 K/uL  Mean Cell Volume : 105.0 fl  Mean Cell Hemoglobin : 35.9 pg  Mean Cell Hemoglobin Concentration : 34.1 gm/dL  Auto Neutrophil # : x  Auto Lymphocyte # : x  Auto Monocyte # : x  Auto Eosinophil # : x  Auto Basophil # : x  Auto Neutrophil % : x  Auto Lymphocyte % : x  Auto Monocyte % : x  Auto Eosinophil % : x  Auto Basophil % : x                          11.1   2.3   )-----------( 51       ( 2019 07:02 )             32.5     02-10    143  |  110<H>  |  8   ----------------------------<  94  3.9   |  22  |  0.84    Ca    9.1      10 Feb 2019 07:06  Phos  2.7     02-10  Mg     2.1     02-10    TPro  5.5<L>  /  Alb  3.3  /  TBili  0.2  /  DBili  x   /  AST  27  /  ALT  43  /  AlkPhos  224<H>  02-10      LIVER FUNCTIONS - ( 10 Feb 2019 07:06 )  Alb: 3.3 g/dL / Pro: 5.5 g/dL / ALK PHOS: 224 U/L / ALT: 43 U/L / AST: 27 U/L / GGT: x             Cultures: no recent    Radiology:   from: CT Head No Cont (19 @ 08:53)   IMPRESSION:  No acute intracranial abnormality is noted. No subdural collections are   visualized. If the patient has new and persistent symptoms, consider   follow-up brain MRI with and without contrast, if there are no MRI or   contrast contraindications.  New moderate mucosal thickening involves the paranasal sinuses. Correlate   for possible sinusitis      Meds:   Antimicrobials:   ciprofloxacin     Tablet 500 milliGRAM(s) Oral every 12 hours  clotrimazole Lozenge 1 Lozenge Oral five times a day  fluconAZOLE   Tablet 400 milliGRAM(s) Oral daily  trimethoprim  160 mG/sulfamethoxazole 800 mG 1 Tablet(s) Oral every 12 hours      Heme / Onc:   fludarabine IVPB (eMAR) 51 milliGRAM(s) IV Intermittent every 24 hours  heparin  Infusion 464 Unit(s)/Hr IV Continuous <Continuous>      GI:  docusate sodium 100 milliGRAM(s) Oral three times a day PRN  pantoprazole    Tablet 40 milliGRAM(s) Oral before breakfast  senna 1 Tablet(s) Oral at bedtime PRN  sodium bicarbonate Mouth Rinse 10 milliLiter(s) Swish and Spit five times a day  ursodiol Capsule 300 milliGRAM(s) Oral two times a day with meals      Cardiovascular:   losartan 100 milliGRAM(s) Oral daily      Immunologic:   immune   globulin 10% (GAMMAGARD) IVPB 20 Gram(s) IV Intermittent <User Schedule>      Other medications:   acetaminophen   Tablet .. 650 milliGRAM(s) Oral every 6 hours  acetaminophen   Tablet .. 650 milliGRAM(s) Oral <User Schedule>  aprepitant 80 milliGRAM(s) Oral every 24 hours  Biotene Dry Mouth Oral Rinse 5 milliLiter(s) Swish and Spit five times a day  diphenhydrAMINE   Injectable 25 milliGRAM(s) IV Push every 3 weeks  folic acid 1 milliGRAM(s) Oral daily  levETIRAcetam 500 milliGRAM(s) Oral two times a day  multivitamin 1 Tablet(s) Oral daily  ondansetron Injectable 8 milliGRAM(s) IV Push every 8 hours  sodium chloride 0.45%. 1000 milliLiter(s) IV Continuous <Continuous>  sodium chloride 0.9%. 1000 milliLiter(s) IV Continuous <Continuous>      PRN:   acetaminophen   Tablet .. 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 Headley).  Neurosurgery consult on , no need for Keppra because has been seizure-free.  Baseline CT Head non-con, no acute intracranial abnormality, possible sinusitus  Cough x 3 weeks, CXR clear, start Tessalon 100 mg PRN, RVP (-), start Cipro 500 mg BID  Chronic back pain- oxycodone 5 mg IR PRN    1. Infectious Disease:   clotrimazole Lozenge 1 Lozenge Oral five times a day  fluconAZOLE   Tablet 400 milliGRAM(s) Oral daily  trimethoprim  160 mG/sulfamethoxazole 800 mG 1 Tablet(s) Oral every 12 hours    2. VOD Prophylaxis: Actigall, Glutamine, Heparin (dosed at 100 units / kg / day)     3. GI Prophylaxis:  Protonix    4. Mouthcare - NS / NaHCO3 rinses, Mycelex, Caphosol; Skin care     5. GVHD prophylaxis: MMF and Tacro to start on Day 10     6. Transfuse & replete electrolytes prn     7. IV hydration, daily weights, strict I&O, prn diuresis     8. PO intake as tolerated, nutrition follow up as needed, MVI, folic acid     9. Antiemetics, anti-diarrhea medications:   aprepitant 80 milliGRAM(s) Oral every 24 hours  ondansetron Injectable 8 milliGRAM(s) IV Push every 8 hours  metoclopramide Injectable 10 milliGRAM(s) IV Push every 6 hours PRN    10. OOB as tolerated, physical therapy consult if needed     11. Monitor coags / fibrinogen 2x week, vitamin K as needed     12. Monitor closely for clinical changes, monitor for fevers     13. Emotional support provided, plan of care discussed and questions addressed     14. Patient education done regarding plan of care, restrictions and discharge planning     15. Continue regular social work input      I have written the above note for Dr. Logan who performed service with me in the room.   Janett Barrow NP-C ([phone])    I have seen and examined patient with NP, I agree with above note as scribed. [facility] Trans[facility] Team                                                      Critical / Counseling Time Provided: 30 minutes                                                                                                                                                        Chief Complaint: admitted for haplo-identical peripheral blood stem cell transplant from son () for treatment of Ph + ALL     S: Patient seen and examined with HPC Transplant Team:   + nausea  + decreased appetite  + constipation    Denies mouth / tongue / throat pain, dyspnea, cough, vomiting, diarrhea, abdominal pain     O: Vitals:   Vital Signs Last 24 Hrs  T(C): 36.3 (2019 06:01), Max: 36.8 (10 Feb 2019 17:19)  T(F): 97.3 (2019 06:01), Max: 98.3 (10 Feb 2019 17:19)  HR: 78 (2019 06:01) (72 - 78)  BP: 146/90 (2019 06:01) (112/68 - 146/90)  RR: 18 (2019 06:01) (17 - 18)  SpO2: 99% (2019 06:01) (98% - 100%)    Admit weight: 111.4 kg  Daily Weight in k.8 (10 Feb 2019 10:06)  Today's weight:     Intake / Output:   02-10 @ 07:01  -  02-11 @ 07:00  --------------------------------------------------------  IN: 1587.7 mL / OUT: 3200 mL / NET: -1612.3 mL      PE:   Oropharynx: no erythema or ulcers  Oral Mucositis: n/a                                                       stGstrstastdstest:st st1st CVS: S1S2, RRR  Lungs: clear bilaterally  Abdomen: soft, NT/ND, normoactive BS x 4Q  Extremities: no edema  Gastric Mucositis: n/a                                                 stGstrstastdstest:st st1st Intestinal Mucositis: n/a                                             stGstrstastdstest:st st1st Skin: no rash  TLC: C/D/I  Neuro: nonfocal  Pain: none      Labs:   CBC Full  -  ( 2019 07:02 )  WBC Count : 2.3 K/uL  Hemoglobin : 11.1 g/dL  Hematocrit : 32.5 %  Platelet Count - Automated : 51 K/uL  Mean Cell Volume : 105.0 fl  Mean Cell Hemoglobin : 35.9 pg  Mean Cell Hemoglobin Concentration : 34.1 gm/dL  Auto Neutrophil # : x  Auto Lymphocyte # : x  Auto Monocyte # : x  Auto Eosinophil # : x  Auto Basophil # : x  Auto Neutrophil % : x  Auto Lymphocyte % : x  Auto Monocyte % : x  Auto Eosinophil % : x  Auto Basophil % : x                          11.1   2.3   )-----------( 51       ( 2019 07:02 )             32.5     02-10    143  |  110<H>  |  8   ----------------------------<  94  3.9   |  22  |  0.84    Ca    9.1      10 Feb 2019 07:06  Phos  2.7     02-10  Mg     2.1     02-10    TPro  5.5<L>  /  Alb  3.3  /  TBili  0.2  /  DBili  x   /  AST  27  /  ALT  43  /  AlkPhos  224<H>  02-10      LIVER FUNCTIONS - ( 10 Feb 2019 07:06 )  Alb: 3.3 g/dL / Pro: 5.5 g/dL / ALK PHOS: 224 U/L / ALT: 43 U/L / AST: 27 U/L / GGT: x             Cultures: no recent    Radiology:   from: CT Head No Cont (19 @ 08:53)   IMPRESSION:  No acute intracranial abnormality is noted. No subdural collections are   visualized. If the patient has new and persistent symptoms, consider   follow-up brain MRI with and without contrast, if there are no MRI or   contrast contraindications.  New moderate mucosal thickening involves the paranasal sinuses. Correlate   for possible sinusitis      Meds:   Antimicrobials:   ciprofloxacin     Tablet 500 milliGRAM(s) Oral every 12 hours  clotrimazole Lozenge 1 Lozenge Oral five times a day  fluconAZOLE   Tablet 400 milliGRAM(s) Oral daily  trimethoprim  160 mG/sulfamethoxazole 800 mG 1 Tablet(s) Oral every 12 hours      Heme / Onc:   fludarabine IVPB (eMAR) 51 milliGRAM(s) IV Intermittent every 24 hours  heparin  Infusion 464 Unit(s)/Hr IV Continuous <Continuous>      GI:  docusate sodium 100 milliGRAM(s) Oral three times a day PRN  pantoprazole    Tablet 40 milliGRAM(s) Oral before breakfast  senna 1 Tablet(s) Oral at bedtime PRN  sodium bicarbonate Mouth Rinse 10 milliLiter(s) Swish and Spit five times a day  ursodiol Capsule 300 milliGRAM(s) Oral two times a day with meals      Cardiovascular:   losartan 100 milliGRAM(s) Oral daily      Immunologic:   immune   globulin 10% (GAMMAGARD) IVPB 20 Gram(s) IV Intermittent <User Schedule>      Other medications:   acetaminophen   Tablet .. 650 milliGRAM(s) Oral every 6 hours  acetaminophen   Tablet .. 650 milliGRAM(s) Oral <User Schedule>  aprepitant 80 milliGRAM(s) Oral every 24 hours  Biotene Dry Mouth Oral Rinse 5 milliLiter(s) Swish and Spit five times a day  diphenhydrAMINE   Injectable 25 milliGRAM(s) IV Push every 3 weeks  folic acid 1 milliGRAM(s) Oral daily  levETIRAcetam 500 milliGRAM(s) Oral two times a day  multivitamin 1 Tablet(s) Oral daily  ondansetron Injectable 8 milliGRAM(s) IV Push every 8 hours  sodium chloride 0.45%. 1000 milliLiter(s) IV Continuous <Continuous>  sodium chloride 0.9%. 1000 milliLiter(s) IV Continuous <Continuous>      PRN:   acetaminophen   Tablet .. 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  Pre Allogeneic PBSCT day - 2  IVIG today   H/o subdural hematomas on Keppra in past, lost to follow-up (Neurologist is Dr. Headley).  Neurosurgery consult on , no need for Keppra because has been seizure-free.  Baseline CT Head non-con, no acute intracranial abnormality, possible sinusitis   Cough x 3 weeks, CXR clear, start Tessalon 100 mg PRN, RVP (-), start Cipro 500 mg BID  Chronic back pain- oxycodone 5 mg IR PRN    1. Infectious Disease:   clotrimazole Lozenge 1 Lozenge Oral five times a day  fluconAZOLE   Tablet 400 milliGRAM(s) Oral daily  trimethoprim  160 mG/sulfamethoxazole 800 mG 1 Tablet(s) Oral every 12 hours  Cipro    2. VOD Prophylaxis: Actigall, Glutamine, Heparin (dosed at 100 units / kg / day)     3. GI Prophylaxis:  Protonix    4. Mouth care - NS / NaHCO3 rinses, Mycelex, Caphosol; Skin care     5. GVHD prophylaxis: MMF and Tacro to start on Day +5    6. Transfuse & replete electrolytes prn     7. IV hydration, daily weights, strict I&O, prn diuresis     8. PO intake as tolerated, nutrition follow up as needed, MVI, folic acid     9. Antiemetics, anti-diarrhea medications:   aprepitant 80 milliGRAM(s) Oral every 24 hours  ondansetron Injectable 8 milliGRAM(s) IV Push every 8 hours  metoclopramide Injectable 10 milliGRAM(s) IV Push every 6 hours PRN    10. OOB as tolerated, physical therapy consult if needed     11. Monitor coags / fibrinogen 2x week, vitamin K as needed     12. Monitor closely for clinical changes, monitor for fevers     13. Emotional support provided, plan of care discussed and questions addressed     14. Patient education done regarding plan of care, restrictions and discharge planning     15. Continue regular social work input      I have written the above note for Dr. Logan who performed service with me in the room.   Janett Brarow NP-C ([phone])    I have seen and examined patient with NP, I agree with above note as scribed. [facility] Transplant Team                                                      Critical / Counseling Time Provided: 30 minutes                                                                                                                                                        Chief Complaint: admitted for haplo-identical peripheral blood stem cell transplant from son () for treatment of Ph + ALL     S: Patient seen and examined with HPC Transplant Team:   + nausea  + decreased appetite  + constipation    Denies mouth / tongue / throat pain, dyspnea, cough, vomiting, diarrhea, abdominal pain     O: Vitals:   Vital Signs Last 24 Hrs  T(C): 36.3 (2019 06:01), Max: 36.8 (10 Feb 2019 17:19)  T(F): 97.3 (2019 06:01), Max: 98.3 (10 Feb 2019 17:19)  HR: 78 (2019 06:01) (72 - 78)  BP: 146/90 (2019 06:01) (112/68 - 146/90)  RR: 18 (2019 06:01) (17 - 18)  SpO2: 99% (2019 06:01) (98% - 100%)    Admit weight: 111.4 kg  Daily Weight in k.8 (10 Feb 2019 10:06)  Today's weight:     Intake / Output:   02-10 @ 07:01  -  02-11 @ 07:00  --------------------------------------------------------  IN: 1587.7 mL / OUT: 3200 mL / NET: -1612.3 mL      PE:   Oropharynx: no erythema or ulcers  Oral Mucositis: n/a                                                       stGstrstastdstest:st st1st CVS: S1S2, RRR  Lungs: clear bilaterally  Abdomen: soft, NT/ND, normoactive BS x 4Q  Extremities: no edema  Gastric Mucositis: n/a                                                 stGstrstastdstest:st st1st Intestinal Mucositis: n/a                                             stGstrstastdstest:st st1st Skin: no rash  TLC: C/D/I  Neuro: nonfocal  Pain: none      Labs:   CBC Full  -  ( 2019 07:02 )  WBC Count : 2.3 K/uL  Hemoglobin : 11.1 g/dL  Hematocrit : 32.5 %  Platelet Count - Automated : 51 K/uL  Mean Cell Volume : 105.0 fl  Mean Cell Hemoglobin : 35.9 pg  Mean Cell Hemoglobin Concentration : 34.1 gm/dL  Auto Neutrophil # : x  Auto Lymphocyte # : x  Auto Monocyte # : x  Auto Eosinophil # : x  Auto Basophil # : x  Auto Neutrophil % : x  Auto Lymphocyte % : x  Auto Monocyte % : x  Auto Eosinophil % : x  Auto Basophil % : x                          11.1   2.3   )-----------( 51       ( 2019 07:02 )             32.5     02-10    143  |  110<H>  |  8   ----------------------------<  94  3.9   |  22  |  0.84    Ca    9.1      10 Feb 2019 07:06  Phos  2.7     02-10  Mg     2.1     02-10    TPro  5.5<L>  /  Alb  3.3  /  TBili  0.2  /  DBili  x   /  AST  27  /  ALT  43  /  AlkPhos  224<H>  02-10      LIVER FUNCTIONS - ( 10 Feb 2019 07:06 )  Alb: 3.3 g/dL / Pro: 5.5 g/dL / ALK PHOS: 224 U/L / ALT: 43 U/L / AST: 27 U/L / GGT: x             Cultures: no recent    Radiology:   from: CT Head No Cont (19 @ 08:53)   IMPRESSION:  No acute intracranial abnormality is noted. No subdural collections are   visualized. If the patient has new and persistent symptoms, consider   follow-up brain MRI with and without contrast, if there are no MRI or   contrast contraindications.  New moderate mucosal thickening involves the paranasal sinuses. Correlate   for possible sinusitis      Meds:   Antimicrobials:   ciprofloxacin     Tablet 500 milliGRAM(s) Oral every 12 hours  clotrimazole Lozenge 1 Lozenge Oral five times a day  fluconAZOLE   Tablet 400 milliGRAM(s) Oral daily  trimethoprim  160 mG/sulfamethoxazole 800 mG 1 Tablet(s) Oral every 12 hours      Heme / Onc:   fludarabine IVPB (eMAR) 51 milliGRAM(s) IV Intermittent every 24 hours  heparin  Infusion 464 Unit(s)/Hr IV Continuous <Continuous>      GI:  docusate sodium 100 milliGRAM(s) Oral three times a day PRN  pantoprazole    Tablet 40 milliGRAM(s) Oral before breakfast  senna 1 Tablet(s) Oral at bedtime PRN  sodium bicarbonate Mouth Rinse 10 milliLiter(s) Swish and Spit five times a day  ursodiol Capsule 300 milliGRAM(s) Oral two times a day with meals      Cardiovascular:   losartan 100 milliGRAM(s) Oral daily      Immunologic:   immune   globulin 10% (GAMMAGARD) IVPB 20 Gram(s) IV Intermittent <User Schedule>      Other medications:   acetaminophen   Tablet .. 650 milliGRAM(s) Oral every 6 hours  acetaminophen   Tablet .. 650 milliGRAM(s) Oral <User Schedule>  aprepitant 80 milliGRAM(s) Oral every 24 hours  Biotene Dry Mouth Oral Rinse 5 milliLiter(s) Swish and Spit five times a day  diphenhydrAMINE   Injectable 25 milliGRAM(s) IV Push every 3 weeks  folic acid 1 milliGRAM(s) Oral daily  levETIRAcetam 500 milliGRAM(s) Oral two times a day  multivitamin 1 Tablet(s) Oral daily  ondansetron Injectable 8 milliGRAM(s) IV Push every 8 hours  sodium chloride 0.45%. 1000 milliLiter(s) IV Continuous <Continuous>  sodium chloride 0.9%. 1000 milliLiter(s) IV Continuous <Continuous>      PRN:   acetaminophen   Tablet .. 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  Pre Allogeneic PBSCT day - 2  IVIG today   H/o subdural hematomas on Keppra in past, lost to follow-up (Neurologist is Dr. Headley).  Neurosurgery consult on , no need for Keppra because has been seizure-free.  Baseline CT Head non-con, no acute intracranial abnormality, possible sinusitis   Cough x 3 weeks, CXR clear, start Tessalon 100 mg PRN, RVP (-), start Cipro 500 mg BID  Chronic back pain- oxycodone 5 mg IR PRN    1. Infectious Disease:   clotrimazole Lozenge 1 Lozenge Oral five times a day  fluconAZOLE   Tablet 400 milliGRAM(s) Oral daily  trimethoprim  160 mG/sulfamethoxazole 800 mG 1 Tablet(s) Oral every 12 hours  Cipro    2. VOD Prophylaxis: Actigall, Glutamine, Heparin (dosed at 100 units / kg / day)     3. GI Prophylaxis:  Protonix    4. Mouth care - NS / NaHCO3 rinses, Mycelex, Caphosol; Skin care     5. GVHD prophylaxis: MMF and Tacro to start on Day +5    6. Transfuse & replete electrolytes prn     7. IV hydration, daily weights, strict I&O, prn diuresis     8. PO intake as tolerated, nutrition follow up as needed, MVI, folic acid     9. Antiemetics, anti-diarrhea medications:   aprepitant 80 milliGRAM(s) Oral every 24 hours  ondansetron Injectable 8 milliGRAM(s) IV Push every 8 hours  metoclopramide Injectable 10 milliGRAM(s) IV Push every 6 hours PRN    10. OOB as tolerated, physical therapy consult if needed     11. Monitor coags / fibrinogen 2x week, vitamin K as needed     12. Monitor closely for clinical changes, monitor for fevers     13. Emotional support provided, plan of care discussed and questions addressed     14. Patient education done regarding plan of care, restrictions and discharge planning     15. Continue regular social work input      I have written the above note for Dr. Logan who performed service with me in the room.   Janett Barrow NP-C ([phone])    I have seen and examined patient with NP, I agree with above note as scribed. [facility] Transplant Team                                                      Critical / Counseling Time Provided: 30 minutes                                                                                                                                                        Chief Complaint: admitted for haplo-identical peripheral blood stem cell transplant from son () for treatment of Ph + ALL     S: Patient seen and examined with HPC Transplant Team:   + nausea  + decreased appetite  + constipation    Denies mouth / tongue / throat pain, dyspnea, cough, vomiting, diarrhea, abdominal pain     O: Vitals:   Vital Signs Last 24 Hrs  T(C): 36.3 (2019 06:01), Max: 36.8 (10 Feb 2019 17:19)  T(F): 97.3 (2019 06:01), Max: 98.3 (10 Feb 2019 17:19)  HR: 78 (2019 06:01) (72 - 78)  BP: 146/90 (2019 06:01) (112/68 - 146/90)  RR: 18 (2019 06:01) (17 - 18)  SpO2: 99% (2019 06:01) (98% - 100%)    Admit weight: 111.4 kg  Daily Weight in k.8 (10 Feb 2019 10:06)  Today's weight:     Intake / Output:   02-10 @ 07:01  -  02-11 @ 07:00  --------------------------------------------------------  IN: 1587.7 mL / OUT: 3200 mL / NET: -1612.3 mL      PE:   Oropharynx: no erythema or ulcers  Oral Mucositis: n/a                                                       stGstrstastdstest:st st1st CVS: S1S2, RRR  Lungs: clear bilaterally  Abdomen: soft, NT/ND, normoactive BS x 4Q  Extremities: no edema  Gastric Mucositis: n/a                                                 stGstrstastdstest:st st1st Intestinal Mucositis: n/a                                             stGstrstastdstest:st st1st Skin: no rash  TLC: C/D/I  Neuro: nonfocal  Pain: none      Labs:   CBC Full  -  ( 2019 07:02 )  WBC Count : 2.3 K/uL  Hemoglobin : 11.1 g/dL  Hematocrit : 32.5 %  Platelet Count - Automated : 51 K/uL  Mean Cell Volume : 105.0 fl  Mean Cell Hemoglobin : 35.9 pg  Mean Cell Hemoglobin Concentration : 34.1 gm/dL  Auto Neutrophil # : x  Auto Lymphocyte # : x  Auto Monocyte # : x  Auto Eosinophil # : x  Auto Basophil # : x  Auto Neutrophil % : x  Auto Lymphocyte % : x  Auto Monocyte % : x  Auto Eosinophil % : x  Auto Basophil % : x                          11.1   2.3   )-----------( 51       ( 2019 07:02 )             32.5     02-10    143  |  110<H>  |  8   ----------------------------<  94  3.9   |  22  |  0.84    Ca    9.1      10 Feb 2019 07:06  Phos  2.7     02-10  Mg     2.1     02-10    TPro  5.5<L>  /  Alb  3.3  /  TBili  0.2  /  DBili  x   /  AST  27  /  ALT  43  /  AlkPhos  224<H>  02-10      LIVER FUNCTIONS - ( 10 Feb 2019 07:06 )  Alb: 3.3 g/dL / Pro: 5.5 g/dL / ALK PHOS: 224 U/L / ALT: 43 U/L / AST: 27 U/L / GGT: x             Cultures: no recent    Radiology:   from: CT Head No Cont (19 @ 08:53)   IMPRESSION:  No acute intracranial abnormality is noted. No subdural collections are   visualized. If the patient has new and persistent symptoms, consider   follow-up brain MRI with and without contrast, if there are no MRI or   contrast contraindications.  New moderate mucosal thickening involves the paranasal sinuses. Correlate   for possible sinusitis      Meds:   Antimicrobials:   ciprofloxacin     Tablet 500 milliGRAM(s) Oral every 12 hours  clotrimazole Lozenge 1 Lozenge Oral five times a day  fluconAZOLE   Tablet 400 milliGRAM(s) Oral daily  trimethoprim  160 mG/sulfamethoxazole 800 mG 1 Tablet(s) Oral every 12 hours      Heme / Onc:   fludarabine IVPB (eMAR) 51 milliGRAM(s) IV Intermittent every 24 hours  heparin  Infusion 464 Unit(s)/Hr IV Continuous <Continuous>      GI:  docusate sodium 100 milliGRAM(s) Oral three times a day PRN  pantoprazole    Tablet 40 milliGRAM(s) Oral before breakfast  senna 1 Tablet(s) Oral at bedtime PRN  sodium bicarbonate Mouth Rinse 10 milliLiter(s) Swish and Spit five times a day  ursodiol Capsule 300 milliGRAM(s) Oral two times a day with meals      Cardiovascular:   losartan 100 milliGRAM(s) Oral daily      Immunologic:   immune   globulin 10% (GAMMAGARD) IVPB 20 Gram(s) IV Intermittent <User Schedule>      Other medications:   acetaminophen   Tablet .. 650 milliGRAM(s) Oral every 6 hours  acetaminophen   Tablet .. 650 milliGRAM(s) Oral <User Schedule>  aprepitant 80 milliGRAM(s) Oral every 24 hours  Biotene Dry Mouth Oral Rinse 5 milliLiter(s) Swish and Spit five times a day  diphenhydrAMINE   Injectable 25 milliGRAM(s) IV Push every 3 weeks  folic acid 1 milliGRAM(s) Oral daily  levETIRAcetam 500 milliGRAM(s) Oral two times a day  multivitamin 1 Tablet(s) Oral daily  ondansetron Injectable 8 milliGRAM(s) IV Push every 8 hours  sodium chloride 0.45%. 1000 milliLiter(s) IV Continuous <Continuous>  sodium chloride 0.9%. 1000 milliLiter(s) IV Continuous <Continuous>      PRN:   acetaminophen   Tablet .. 650 milliGRAM(s) Oral every 6 hours PRN  benzonatate 100 milliGRAM(s) Oral every 8 hours PRN  diphenhydrAMINE   Injectable 25 milliGRAM(s) IV Push every 4 hours PRN  docusate sodium 100 milliGRAM(s) Oral three times a day PRN  LORazepam   Injectable 1 milliGRAM(s) IV Push every 6 hours PRN  metoclopramide Injectable 10 milliGRAM(s) IV Push every 6 hours PRN  oxyCODONE    IR 5 milliGRAM(s) Oral every 6 hours PRN  senna 1 Tablet(s) Oral at bedtime PRN        A/P:  58 year old with relapsed ALL Ph (+) s/p autologous SCT in 2016, now  here for 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  Pre Allogeneic PBSCT day - 2  IVIG today   H/o subdural hematomas on Keppra in past, lost to follow-up (Neurologist is Dr. Headley).  Neurosurgery consult on , no need for Keppra because has been seizure-free.  Baseline CT Head non-con, no acute intracranial abnormality, possible sinusitis   Cough x 3 weeks, CXR clear, start Tessalon 100 mg PRN, RVP (-), start Cipro 500 mg BID  Chronic back pain- oxycodone 5 mg IR PRN    1. Infectious Disease:   clotrimazole Lozenge 1 Lozenge Oral five times a day  fluconAZOLE   Tablet 400 milliGRAM(s) Oral daily  trimethoprim  160 mG/sulfamethoxazole 800 mG 1 Tablet(s) Oral every 12 hours  Cipro    2. VOD Prophylaxis: Actigall, Glutamine, Heparin (dosed at 100 units / kg / day)     3. GI Prophylaxis:  Protonix    4. Mouth care - NS / NaHCO3 rinses, Mycelex, Caphosol; Skin care     5. GVHD prophylaxis: MMF and [place] to start on Day +5    6. Transfuse & replete electrolytes prn     7. IV hydration, daily weights, strict I&O, prn diuresis     8. PO intake as tolerated, nutrition follow up as needed, MVI, folic acid     9. Antiemetics, anti-diarrhea medications:   aprepitant 80 milliGRAM(s) Oral every 24 hours  ondansetron Injectable 8 milliGRAM(s) IV Push every 8 hours  metoclopramide Injectable 10 milliGRAM(s) IV Push every 6 hours PRN    10. OOB as tolerated, physical therapy consult if needed     11. Monitor coags / fibrinogen 2x week, vitamin K as needed     12. Monitor closely for clinical changes, monitor for fevers     13. Emotional support provided, plan of care discussed and questions addressed     14. Patient education done regarding plan of care, restrictions and discharge planning     15. Continue regular social work input      I have written the above note for Dr. Logan who performed service with me in the room.   Janett Barrow NP-C ([phone])    I have seen and examined patient with NP, I agree with above note as scribed.

## 2019-02-11 NOTE — PROGRESS NOTE ADULT - ATTENDING COMMENTS
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  Bone marrow biopsy on 1/23/19 showed remission with FISH and PCR for BCR-ABL translocation negative on the BM specimen but peripheral blood testing on 1/31 showed .004% BCR-ABL transcript with negative FISH suggesting MRD positivity.  Patient was on Ponatinib, DC 1/31/19. LFT's improved off drug. CR2    Patient was admitted for  haploidentical allogeneic stem cell transplantation with fludarabine/cytoxan/TBI conditioning. Day -3    Patient complaint of flu-like symptoms on admission, CxR was negative for pneumonia. Viral studies negative.     Continue Acyclovir, Fluconazole. Bactrim through day -1.     History of SDH, continue Keppra. CT scan baseline 2/7/19 negative for SDH.     History of HTN continue Losartan.     VOD prophylaxis as per protocol. 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  Bone marrow biopsy on 1/23/19 showed remission with FISH and PCR for BCR-ABL translocation negative on the BM specimen but peripheral blood testing on 1/31 showed .004% BCR-ABL transcript with negative FISH suggesting MRD positivity.  Patient was on Ponatinib, DC 1/31/19. LFT's improved off drug. CR2    Patient was admitted for  haploidentical allogeneic stem cell transplantation with fludarabine/cytoxan/TBI conditioning. Day -2    Patient complaint of flu-like symptoms on admission, CxR was negative for pneumonia. Viral studies negative.     Continue Acyclovir, Fluconazole.     History of SDH, continue Keppra. CT scan baseline 2/7/19 negative for SDH.     History of HTN continue Losartan.     VOD prophylaxis as per protocol. 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  Bone marrow biopsy on 1/23/19 showed remission with FISH and PCR for BCR-ABL translocation negative on the BM specimen but peripheral blood testing on 1/31 showed .004% BCR-ABL transcript with negative FISH suggesting MRD positivity.  Patient was on Ponatinib, DC 1/31/19. LFT's improved off drug. CR2    Patient was admitted for haploidentical stem cell transplantation with fludarabine/cytoxan/TBI conditioning. Day -2    Patient complaint of flu-like symptoms on admission, CXR was negative for pneumonia. Viral studies negative.     Continue Acyclovir, Fluconazole prophylaxis.    History of SDH, continue Keppra. CT scan baseline 2/7/19 negative for SDH.     History of HTN continue Losartan.     VOD prophylaxis as per protocol. 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  Bone marrow biopsy on 1/23/19 showed remission with FISH and PCR for BCR-ABL translocation negative on the BM specimen but peripheral blood testing on 1/31 showed .004% BCR-ABL transcript with negative FISH suggesting MRD positivity.  Patient was on Ponatinib, DC 1/31/19. LFT's improved off drug. CR2    Patient was admitted for haploidentical stem cell transplantation with fludarabine/cytoxan/TBI conditioning. Day -2    Patient complaint of flu-like symptoms on admission, CXR was negative for pneumonia. Viral studies negative.     Continue Acyclovir, Fluconazole prophylaxis.    History of SDH, continue Keppra. CT scan baseline 2/7/19 negative for SDH.     History of HTN continue Losartan.     VOD prophylaxis as per protocol.

## 2019-02-12 LAB
ALBUMIN SERPL ELPH-MCNC: 3.3 G/DL — SIGNIFICANT CHANGE UP (ref 3.3–5)
ALP SERPL-CCNC: 221 U/L — HIGH (ref 40–120)
ALT FLD-CCNC: 46 U/L — HIGH (ref 10–45)
ANION GAP SERPL CALC-SCNC: 9 MMOL/L — SIGNIFICANT CHANGE UP (ref 5–17)
AST SERPL-CCNC: 31 U/L — SIGNIFICANT CHANGE UP (ref 10–40)
BASOPHILS # BLD AUTO: 0 K/UL — SIGNIFICANT CHANGE UP (ref 0–0.2)
BASOPHILS NFR BLD AUTO: 0 % — SIGNIFICANT CHANGE UP (ref 0–2)
BILIRUB SERPL-MCNC: 0.2 MG/DL — SIGNIFICANT CHANGE UP (ref 0.2–1.2)
BUN SERPL-MCNC: 12 MG/DL — SIGNIFICANT CHANGE UP (ref 7–23)
CALCIUM SERPL-MCNC: 10.1 MG/DL — SIGNIFICANT CHANGE UP (ref 8.4–10.5)
CHLORIDE SERPL-SCNC: 108 MMOL/L — SIGNIFICANT CHANGE UP (ref 96–108)
CO2 SERPL-SCNC: 23 MMOL/L — SIGNIFICANT CHANGE UP (ref 22–31)
CREAT SERPL-MCNC: 0.86 MG/DL — SIGNIFICANT CHANGE UP (ref 0.5–1.3)
EOSINOPHIL # BLD AUTO: 0.1 K/UL — SIGNIFICANT CHANGE UP (ref 0–0.5)
EOSINOPHIL NFR BLD AUTO: 6.1 % — HIGH (ref 0–6)
GLUCOSE SERPL-MCNC: 90 MG/DL — SIGNIFICANT CHANGE UP (ref 70–99)
HCT VFR BLD CALC: 33.1 % — LOW (ref 34.5–45)
HGB BLD-MCNC: 10.8 G/DL — LOW (ref 11.5–15.5)
LDH SERPL L TO P-CCNC: 139 U/L — SIGNIFICANT CHANGE UP (ref 50–242)
LYMPHOCYTES # BLD AUTO: 0.1 K/UL — LOW (ref 1–3.3)
LYMPHOCYTES # BLD AUTO: 3.6 % — LOW (ref 13–44)
MAGNESIUM SERPL-MCNC: 2.1 MG/DL — SIGNIFICANT CHANGE UP (ref 1.6–2.6)
MCHC RBC-ENTMCNC: 32.8 GM/DL — SIGNIFICANT CHANGE UP (ref 32–36)
MCHC RBC-ENTMCNC: 34.4 PG — HIGH (ref 27–34)
MCV RBC AUTO: 105 FL — HIGH (ref 80–100)
MONOCYTES # BLD AUTO: 0 K/UL — SIGNIFICANT CHANGE UP (ref 0–0.9)
MONOCYTES NFR BLD AUTO: 0.7 % — LOW (ref 2–14)
NEUTROPHILS # BLD AUTO: 1.3 K/UL — LOW (ref 1.8–7.4)
NEUTROPHILS NFR BLD AUTO: 89.6 % — HIGH (ref 43–77)
PHOSPHATE SERPL-MCNC: 3 MG/DL — SIGNIFICANT CHANGE UP (ref 2.5–4.5)
PLAT MORPH BLD: NORMAL — SIGNIFICANT CHANGE UP
PLATELET # BLD AUTO: 47 K/UL — LOW (ref 150–400)
POTASSIUM SERPL-MCNC: 4.4 MMOL/L — SIGNIFICANT CHANGE UP (ref 3.5–5.3)
POTASSIUM SERPL-SCNC: 4.4 MMOL/L — SIGNIFICANT CHANGE UP (ref 3.5–5.3)
PROT SERPL-MCNC: 6 G/DL — SIGNIFICANT CHANGE UP (ref 6–8.3)
RBC # BLD: 3.16 M/UL — LOW (ref 3.8–5.2)
RBC # FLD: 12.6 % — SIGNIFICANT CHANGE UP (ref 10.3–14.5)
RBC BLD AUTO: SIGNIFICANT CHANGE UP
SODIUM SERPL-SCNC: 140 MMOL/L — SIGNIFICANT CHANGE UP (ref 135–145)
WBC # BLD: 1.4 K/UL — LOW (ref 3.8–10.5)
WBC # FLD AUTO: 1.4 K/UL — LOW (ref 3.8–10.5)

## 2019-02-12 PROCEDURE — 77331 SPECIAL RADIATION DOSIMETRY: CPT | Mod: 26

## 2019-02-12 PROCEDURE — 77431 RADIATION THERAPY MANAGEMENT: CPT

## 2019-02-12 PROCEDURE — 99291 CRITICAL CARE FIRST HOUR: CPT

## 2019-02-12 PROCEDURE — 38240 TRANSPLT ALLO HCT/DONOR: CPT

## 2019-02-12 RX ORDER — POLYETHYLENE GLYCOL 3350 17 G/17G
17 POWDER, FOR SOLUTION ORAL ONCE
Qty: 0 | Refills: 0 | Status: COMPLETED | OUTPATIENT
Start: 2019-02-12 | End: 2019-02-12

## 2019-02-12 RX ORDER — POLYETHYLENE GLYCOL 3350 17 G/17G
17 POWDER, FOR SOLUTION ORAL DAILY
Qty: 0 | Refills: 0 | Status: DISCONTINUED | OUTPATIENT
Start: 2019-02-13 | End: 2019-03-04

## 2019-02-12 RX ORDER — POLYETHYLENE GLYCOL 3350 17 G/17G
17 POWDER, FOR SOLUTION ORAL ONCE
Qty: 0 | Refills: 0 | Status: DISCONTINUED | OUTPATIENT
Start: 2019-02-12 | End: 2019-02-12

## 2019-02-12 RX ADMIN — URSODIOL 300 MILLIGRAM(S): 250 TABLET, FILM COATED ORAL at 17:10

## 2019-02-12 RX ADMIN — Medication 10 MILLIGRAM(S): at 23:51

## 2019-02-12 RX ADMIN — Medication 500 MILLIGRAM(S): at 11:46

## 2019-02-12 RX ADMIN — Medication 10 MILLILITER(S): at 23:51

## 2019-02-12 RX ADMIN — Medication 10 MILLILITER(S): at 11:46

## 2019-02-12 RX ADMIN — HEPARIN SODIUM 4.64 UNIT(S)/HR: 5000 INJECTION INTRAVENOUS; SUBCUTANEOUS at 21:31

## 2019-02-12 RX ADMIN — Medication 1 LOZENGE: at 07:24

## 2019-02-12 RX ADMIN — Medication 10 MILLIGRAM(S): at 17:10

## 2019-02-12 RX ADMIN — Medication 5 MILLILITER(S): at 23:51

## 2019-02-12 RX ADMIN — POLYETHYLENE GLYCOL 3350 17 GRAM(S): 17 POWDER, FOR SOLUTION ORAL at 16:27

## 2019-02-12 RX ADMIN — ONDANSETRON 8 MILLIGRAM(S): 8 TABLET, FILM COATED ORAL at 07:43

## 2019-02-12 RX ADMIN — Medication 400 MILLIGRAM(S): at 14:58

## 2019-02-12 RX ADMIN — Medication 400 MILLIGRAM(S): at 05:54

## 2019-02-12 RX ADMIN — Medication 400 MILLIGRAM(S): at 21:28

## 2019-02-12 RX ADMIN — Medication 5 MILLILITER(S): at 07:24

## 2019-02-12 RX ADMIN — FLUCONAZOLE 400 MILLIGRAM(S): 150 TABLET ORAL at 11:47

## 2019-02-12 RX ADMIN — LOSARTAN POTASSIUM 100 MILLIGRAM(S): 100 TABLET, FILM COATED ORAL at 05:54

## 2019-02-12 RX ADMIN — OXYCODONE HYDROCHLORIDE 5 MILLIGRAM(S): 5 TABLET ORAL at 12:17

## 2019-02-12 RX ADMIN — ONDANSETRON 8 MILLIGRAM(S): 8 TABLET, FILM COATED ORAL at 17:10

## 2019-02-12 RX ADMIN — Medication 10 MILLIGRAM(S): at 05:53

## 2019-02-12 RX ADMIN — Medication 1 LOZENGE: at 11:45

## 2019-02-12 RX ADMIN — Medication 1 LOZENGE: at 19:44

## 2019-02-12 RX ADMIN — Medication 10 MILLIGRAM(S): at 11:48

## 2019-02-12 RX ADMIN — Medication 100 MILLIGRAM(S): at 05:53

## 2019-02-12 RX ADMIN — SODIUM CHLORIDE 20 MILLILITER(S): 9 INJECTION INTRAMUSCULAR; INTRAVENOUS; SUBCUTANEOUS at 05:53

## 2019-02-12 RX ADMIN — PANTOPRAZOLE SODIUM 40 MILLIGRAM(S): 20 TABLET, DELAYED RELEASE ORAL at 06:39

## 2019-02-12 RX ADMIN — APREPITANT 80 MILLIGRAM(S): 80 CAPSULE ORAL at 11:46

## 2019-02-12 RX ADMIN — Medication 1 LOZENGE: at 16:27

## 2019-02-12 RX ADMIN — SODIUM CHLORIDE 20 MILLILITER(S): 9 INJECTION INTRAMUSCULAR; INTRAVENOUS; SUBCUTANEOUS at 16:29

## 2019-02-12 RX ADMIN — Medication 5 MILLILITER(S): at 19:44

## 2019-02-12 RX ADMIN — Medication 10 MILLILITER(S): at 19:44

## 2019-02-12 RX ADMIN — Medication 5 MILLIGRAM(S): at 17:16

## 2019-02-12 RX ADMIN — Medication 1 MILLIGRAM(S): at 11:47

## 2019-02-12 RX ADMIN — HEPARIN SODIUM 4.64 UNIT(S)/HR: 5000 INJECTION INTRAVENOUS; SUBCUTANEOUS at 14:57

## 2019-02-12 RX ADMIN — Medication 10 MILLILITER(S): at 07:25

## 2019-02-12 RX ADMIN — LEVETIRACETAM 500 MILLIGRAM(S): 250 TABLET, FILM COATED ORAL at 17:11

## 2019-02-12 RX ADMIN — Medication 1 LOZENGE: at 23:51

## 2019-02-12 RX ADMIN — Medication 10 MILLILITER(S): at 16:28

## 2019-02-12 RX ADMIN — OXYCODONE HYDROCHLORIDE 5 MILLIGRAM(S): 5 TABLET ORAL at 11:43

## 2019-02-12 RX ADMIN — URSODIOL 300 MILLIGRAM(S): 250 TABLET, FILM COATED ORAL at 07:25

## 2019-02-12 RX ADMIN — Medication 5 MILLILITER(S): at 16:27

## 2019-02-12 RX ADMIN — Medication 5 MILLILITER(S): at 11:46

## 2019-02-12 RX ADMIN — LEVETIRACETAM 500 MILLIGRAM(S): 250 TABLET, FILM COATED ORAL at 05:53

## 2019-02-12 RX ADMIN — Medication 1 TABLET(S): at 11:46

## 2019-02-12 RX ADMIN — Medication 500 MILLIGRAM(S): at 21:30

## 2019-02-12 RX ADMIN — ONDANSETRON 8 MILLIGRAM(S): 8 TABLET, FILM COATED ORAL at 02:54

## 2019-02-12 RX ADMIN — SODIUM CHLORIDE 150 MILLILITER(S): 9 INJECTION, SOLUTION INTRAVENOUS at 22:01

## 2019-02-12 NOTE — PROGRESS NOTE ADULT - ATTENDING COMMENTS
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  Bone marrow biopsy on 1/23/19 showed remission with FISH and PCR for BCR-ABL translocation negative on the BM specimen but peripheral blood testing on 1/31 showed .004% BCR-ABL transcript with negative FISH suggesting MRD positivity.  Patient was on Ponatinib, DC 1/31/19. LFT's improved off drug. CR2    Patient was admitted for haploidentical stem cell transplantation with fludarabine/cytoxan/TBI conditioning. Day -2    Patient complaint of flu-like symptoms on admission, CXR was negative for pneumonia. Viral studies negative.     Continue Acyclovir, Fluconazole prophylaxis.    History of SDH, continue Keppra. CT scan baseline 2/7/19 negative for SDH.     History of HTN continue Losartan.     VOD prophylaxis as per protocol. 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  Bone marrow biopsy on 1/23/19 showed remission with FISH and PCR for BCR-ABL translocation negative on the BM specimen but peripheral blood testing on 1/31 showed .004% BCR-ABL transcript with negative FISH suggesting MRD positivity.  Patient was on Ponatinib, DC 1/31/19. LFT's improved off drug. CR2    Patient was admitted for haploidentical stem cell transplantation with fludarabine/cytoxan/TBI conditioning. Day -1    Patient complaint of flu-like symptoms on admission, CXR was negative for pneumonia. Viral studies negative.     Continue Cipro, Acyclovir, Fluconazole prophylaxis.    History of SDH, continue Keppra. CT scan baseline 2/7/19 negative for SDH.     History of HTN continue Losartan.     VOD prophylaxis as per protocol.

## 2019-02-12 NOTE — PROGRESS NOTE ADULT - SUBJECTIVE AND OBJECTIVE BOX
HPC Transplant Team                                                      Critical / Counseling Time Provided: 30 minutes                                                                                                                                                        Chief Complaint: admitted for haplo-identical peripheral blood stem cell transplant   from son (4/8) for treatment of Ph + ALL     S: Patient seen and examined with HPC Transplant Team:       Denies mouth / tongue / throat pain, dyspnea, cough, nausea, vomiting, diarrhea, abdominal pain     O: Vitals:   Vital Signs Last 24 Hrs  T(C): 36.3 (12 Feb 2019 04:49), Max: 37 (11 Feb 2019 13:56)  T(F): 97.4 (12 Feb 2019 04:49), Max: 98.6 (11 Feb 2019 13:56)  HR: 66 (12 Feb 2019 04:49) (66 - 76)  BP: 123/74 (12 Feb 2019 04:49) (116/76 - 145/84)  RR: 18 (12 Feb 2019 04:49) (18 - 18)  SpO2: 100% (11 Feb 2019 21:28) (98% - 100%)    Admit weight: 111.4 kg  Daily     Daily     Intake / Output:   02-11 @ 07:01  -  02-12 @ 07:00  --------------------------------------------------------  IN: 1817.5 mL / OUT: 2700 mL / NET: -882.5 mL        PE:   Oropharynx: no erythema or ulcers  Oral Mucositis: n/a                                                       stGstrstastdstest:st st1st CVS: S1S2, RRR  Lungs: clear bilaterally  Abdomen: soft, NT/ND, normoactive BS x 4Q  Extremities: no edema  Gastric Mucositis: n/a                                                 stGstrstastdstest:st st1st Intestinal Mucositis: n/a                                             stGstrstastdstest:st st1st Skin: no rash  TLC: C/D/I  Neuro: nonfocal  Pain: none      Labs:     CBC Full  -  ( 12 Feb 2019 06:37 )  WBC Count : 1.4 K/uL  Hemoglobin : 10.8 g/dL  Hematocrit : 33.1 %  Platelet Count - Automated : 47 K/uL  Mean Cell Volume : 105.0 fl  Mean Cell Hemoglobin : 34.4 pg  Mean Cell Hemoglobin Concentration : 32.8 gm/dL  Auto Neutrophil # : x  Auto Lymphocyte # : x  Auto Monocyte # : x  Auto Eosinophil # : x  Auto Basophil # : x  Auto Neutrophil % : x  Auto Lymphocyte % : x  Auto Monocyte % : x  Auto Eosinophil % : x  Auto Basophil % : x                          10.8   1.4   )-----------( 47       ( 12 Feb 2019 06:37 )             33.1     02-12    140  |  108  |  12  ----------------------------<  90  4.4   |  23  |  0.86    Ca    10.1      12 Feb 2019 06:37  Phos  3.0     02-12  Mg     2.1     02-12    TPro  6.0  /  Alb  3.3  /  TBili  0.2  /  DBili  x   /  AST  31  /  ALT  46<H>  /  AlkPhos  221<H>  02-12    PT/INR - ( 11 Feb 2019 07:02 )   PT: 10.2 sec;   INR: 0.89 ratio         PTT - ( 11 Feb 2019 07:02 )  PTT:54.6 sec  LIVER FUNCTIONS - ( 12 Feb 2019 06:37 )  Alb: 3.3 g/dL / Pro: 6.0 g/dL / ALK PHOS: 221 U/L / ALT: 46 U/L / AST: 31 U/L / GGT: x           Lactate Dehydrogenase, Serum: 139 U/L (02-12 @ 06:37)      Cultures: no recent    Radiology:   from: CT Head No Cont (02.08.19 @ 08:53)   IMPRESSION:  No acute intracranial abnormality is noted. No subdural collections are   visualized. If the patient has new and persistent symptoms, consider   follow-up brain MRI with and without contrast, if there are no MRI or   contrast contraindications.  New moderate mucosal thickening involves the paranasal sinuses. Correlate   for possible sinusitis    Meds:   Antimicrobials:   acyclovir   Oral Tab/Cap 400 milliGRAM(s) Oral every 8 hours  ciprofloxacin     Tablet 500 milliGRAM(s) Oral every 12 hours  clotrimazole Lozenge 1 Lozenge Oral five times a day  fluconAZOLE   Tablet 400 milliGRAM(s) Oral daily      Heme / Onc:   heparin  Infusion 464 Unit(s)/Hr IV Continuous <Continuous>      GI:  docusate sodium 100 milliGRAM(s) Oral three times a day PRN  pantoprazole    Tablet 40 milliGRAM(s) Oral before breakfast  polyethylene glycol 3350 17 Gram(s) Oral daily PRN  senna 1 Tablet(s) Oral at bedtime PRN  sodium bicarbonate Mouth Rinse 10 milliLiter(s) Swish and Spit five times a day  ursodiol Capsule 300 milliGRAM(s) Oral two times a day with meals      Cardiovascular:   losartan 100 milliGRAM(s) Oral daily      Immunologic:   immune   globulin 10% (GAMMAGARD) IVPB 20 Gram(s) IV Intermittent <User Schedule>      Other medications:   acetaminophen   Tablet .. 650 milliGRAM(s) Oral every 6 hours  acetaminophen   Tablet .. 650 milliGRAM(s) Oral <User Schedule>  aprepitant 80 milliGRAM(s) Oral every 24 hours  aprepitant 80 milliGRAM(s) Oral daily  Biotene Dry Mouth Oral Rinse 5 milliLiter(s) Swish and Spit five times a day  diphenhydrAMINE   Injectable 25 milliGRAM(s) IV Push every 3 weeks  folic acid 1 milliGRAM(s) Oral daily  levETIRAcetam 500 milliGRAM(s) Oral two times a day  metoclopramide Injectable 10 milliGRAM(s) IV Push every 6 hours  multivitamin 1 Tablet(s) Oral daily  ondansetron Injectable 8 milliGRAM(s) IV Push every 8 hours  sodium chloride 0.45% 1000 milliLiter(s) IV Continuous <Continuous>  sodium chloride 0.45%. 1000 milliLiter(s) IV Continuous <Continuous>  sodium chloride 0.9%. 1000 milliLiter(s) IV Continuous <Continuous>      PRN:   acetaminophen   Tablet .. 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  Pre Allogeneic PBSCT day - 1  TBI today  s/p IVIG, fludarabine 5/5 yesterday   H/o subdural hematomas on Keppra in past, lost to follow-up (Neurologist is Dr. Headley).  Neurosurgery consult on 2/7, no need for Keppra because has been seizure-free.  Baseline CT Head non-con, no acute intracranial abnormality, possible sinusitis   Cough x 3 weeks, CXR clear, start Tessalon 100 mg PRN, RVP (-), start Cipro 500 mg BID  Chronic back pain- oxycodone 5 mg IR PRN    1. Infectious Disease:   clotrimazole Lozenge 1 Lozenge Oral five times a day  fluconAZOLE   Tablet 400 milliGRAM(s) Oral daily  trimethoprim  160 mG/sulfamethoxazole 800 mG 1 Tablet(s) Oral every 12 hours  Cipro    2. VOD Prophylaxis: Actigall, Glutamine, Heparin (dosed at 100 units / kg / day)     3. GI Prophylaxis:  Protonix    4. Mouth care - NS / NaHCO3 rinses, Mycelex, Caphosol; Skin care     5. GVHD prophylaxis: MMF and Tacro to start on Day +5    6. Transfuse & replete electrolytes prn     7. IV hydration, daily weights, strict I&O, prn diuresis     8. PO intake as tolerated, nutrition follow up as needed, MVI, folic acid     9. Antiemetics, anti-diarrhea medications:   aprepitant 80 milliGRAM(s) Oral every 24 hours  ondansetron Injectable 8 milliGRAM(s) IV Push every 8 hours  metoclopramide Injectable 10 milliGRAM(s) IV Push every 6 hours PRN    10. OOB as tolerated, physical therapy consult if needed     11. Monitor coags / fibrinogen 2x week, vitamin K as needed     12. Monitor closely for clinical changes, monitor for fevers     13. Emotional support provided, plan of care discussed and questions addressed     14. Patient education done regarding plan of care, restrictions and discharge planning     15. Continue regular social work input      I have written the above note for Dr. Logan who performed service with me in the room.   Janett Quezada NP-C ([phone])    I have seen and examined patient with NP, I agree with above note as scribed. HPC Transplant Team                                                      Critical / Counseling Time Provided: 30 minutes                                                                                                                                                        Chief Complaint: admitted for haplo-identical peripheral blood stem cell transplant   from son (4/8) for treatment of Ph + ALL     S: Patient seen and examined with HPC Transplant Team:   + constipation  Nausea much better    Denies mouth / tongue / throat pain, dyspnea, cough, vomiting, diarrhea    O: Vitals:   Vital Signs Last 24 Hrs  T(C): 36.3 (12 Feb 2019 04:49), Max: 37 (11 Feb 2019 13:56)  T(F): 97.4 (12 Feb 2019 04:49), Max: 98.6 (11 Feb 2019 13:56)  HR: 66 (12 Feb 2019 04:49) (66 - 76)  BP: 123/74 (12 Feb 2019 04:49) (116/76 - 145/84)  RR: 18 (12 Feb 2019 04:49) (18 - 18)  SpO2: 100% (11 Feb 2019 21:28) (98% - 100%)    Admit weight: 111.4 kg  Daily     Daily     Intake / Output:   02-11 @ 07:01  -  02-12 @ 07:00  --------------------------------------------------------  IN: 1817.5 mL / OUT: 2700 mL / NET: -882.5 mL        PE:   Oropharynx: no erythema or ulcers  Oral Mucositis: n/a                                                       stGstrstastdstest:st st1st CVS: S1S2, RRR  Lungs: clear bilaterally  Abdomen: soft, NT/ND, normoactive BS x 4Q  Extremities: no edema  Gastric Mucositis: n/a                                                 stGstrstastdstest:st st1st Intestinal Mucositis: n/a                                             stGstrstastdstest:st st1st Skin: no rash  TLC: C/D/I  Neuro: nonfocal  Pain: none      Labs:     CBC Full  -  ( 12 Feb 2019 06:37 )  WBC Count : 1.4 K/uL  Hemoglobin : 10.8 g/dL  Hematocrit : 33.1 %  Platelet Count - Automated : 47 K/uL  Mean Cell Volume : 105.0 fl  Mean Cell Hemoglobin : 34.4 pg  Mean Cell Hemoglobin Concentration : 32.8 gm/dL  Auto Neutrophil # : x  Auto Lymphocyte # : x  Auto Monocyte # : x  Auto Eosinophil # : x  Auto Basophil # : x  Auto Neutrophil % : x  Auto Lymphocyte % : x  Auto Monocyte % : x  Auto Eosinophil % : x  Auto Basophil % : x                          10.8   1.4   )-----------( 47       ( 12 Feb 2019 06:37 )             33.1     02-12    140  |  108  |  12  ----------------------------<  90  4.4   |  23  |  0.86    Ca    10.1      12 Feb 2019 06:37  Phos  3.0     02-12  Mg     2.1     02-12    TPro  6.0  /  Alb  3.3  /  TBili  0.2  /  DBili  x   /  AST  31  /  ALT  46<H>  /  AlkPhos  221<H>  02-12    PT/INR - ( 11 Feb 2019 07:02 )   PT: 10.2 sec;   INR: 0.89 ratio         PTT - ( 11 Feb 2019 07:02 )  PTT:54.6 sec  LIVER FUNCTIONS - ( 12 Feb 2019 06:37 )  Alb: 3.3 g/dL / Pro: 6.0 g/dL / ALK PHOS: 221 U/L / ALT: 46 U/L / AST: 31 U/L / GGT: x           Lactate Dehydrogenase, Serum: 139 U/L (02-12 @ 06:37)      Cultures: no recent    Radiology:   from: CT Head No Cont (02.08.19 @ 08:53)   IMPRESSION:  No acute intracranial abnormality is noted. No subdural collections are   visualized. If the patient has new and persistent symptoms, consider   follow-up brain MRI with and without contrast, if there are no MRI or   contrast contraindications.  New moderate mucosal thickening involves the paranasal sinuses. Correlate   for possible sinusitis    Meds:   Antimicrobials:   acyclovir   Oral Tab/Cap 400 milliGRAM(s) Oral every 8 hours  ciprofloxacin     Tablet 500 milliGRAM(s) Oral every 12 hours  clotrimazole Lozenge 1 Lozenge Oral five times a day  fluconAZOLE   Tablet 400 milliGRAM(s) Oral daily      Heme / Onc:   heparin  Infusion 464 Unit(s)/Hr IV Continuous <Continuous>      GI:  docusate sodium 100 milliGRAM(s) Oral three times a day PRN  pantoprazole    Tablet 40 milliGRAM(s) Oral before breakfast  polyethylene glycol 3350 17 Gram(s) Oral daily PRN  senna 1 Tablet(s) Oral at bedtime PRN  sodium bicarbonate Mouth Rinse 10 milliLiter(s) Swish and Spit five times a day  ursodiol Capsule 300 milliGRAM(s) Oral two times a day with meals      Cardiovascular:   losartan 100 milliGRAM(s) Oral daily      Immunologic:   immune   globulin 10% (GAMMAGARD) IVPB 20 Gram(s) IV Intermittent <User Schedule>      Other medications:   acetaminophen   Tablet .. 650 milliGRAM(s) Oral every 6 hours  acetaminophen   Tablet .. 650 milliGRAM(s) Oral <User Schedule>  aprepitant 80 milliGRAM(s) Oral every 24 hours  aprepitant 80 milliGRAM(s) Oral daily  Biotene Dry Mouth Oral Rinse 5 milliLiter(s) Swish and Spit five times a day  diphenhydrAMINE   Injectable 25 milliGRAM(s) IV Push every 3 weeks  folic acid 1 milliGRAM(s) Oral daily  levETIRAcetam 500 milliGRAM(s) Oral two times a day  metoclopramide Injectable 10 milliGRAM(s) IV Push every 6 hours  multivitamin 1 Tablet(s) Oral daily  ondansetron Injectable 8 milliGRAM(s) IV Push every 8 hours  sodium chloride 0.45% 1000 milliLiter(s) IV Continuous <Continuous>  sodium chloride 0.45%. 1000 milliLiter(s) IV Continuous <Continuous>  sodium chloride 0.9%. 1000 milliLiter(s) IV Continuous <Continuous>      PRN:   acetaminophen   Tablet .. 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  Pre Allogeneic PBSCT day - 1  TBI today  s/p IVIG, fludarabine 5/5 yesterday   H/o subdural hematomas on Keppra in past, lost to follow-up (Neurologist is Dr. Headley).  Neurosurgery consult on 2/7, no need for Keppra because has been seizure-free.  Baseline CT Head non-con, no acute intracranial abnormality, possible sinusitis   Cough x 3 weeks, CXR clear, start Tessalon 100 mg PRN, RVP (-), start Cipro 500 mg BID  Chronic back pain- oxycodone 5 mg IR PRN    1. Infectious Disease:   clotrimazole Lozenge 1 Lozenge Oral five times a day  fluconAZOLE   Tablet 400 milliGRAM(s) Oral daily  trimethoprim  160 mG/sulfamethoxazole 800 mG 1 Tablet(s) Oral every 12 hours  Cipro    2. VOD Prophylaxis: Actigall, Glutamine, Heparin (dosed at 100 units / kg / day)     3. GI Prophylaxis:  Protonix    4. Mouth care - NS / NaHCO3 rinses, Mycelex, Caphosol; Skin care     5. GVHD prophylaxis: MMF and Tacro to start on Day +5    6. Transfuse & replete electrolytes prn     7. IV hydration, daily weights, strict I&O, prn diuresis     8. PO intake as tolerated, nutrition follow up as needed, MVI, folic acid     9. Antiemetics, anti-diarrhea medications:   aprepitant 80 milliGRAM(s) Oral every 24 hours  ondansetron Injectable 8 milliGRAM(s) IV Push every 8 hours  metoclopramide Injectable 10 milliGRAM(s) IV Push every 6 hours PRN    10. OOB as tolerated, physical therapy consult if needed     11. Monitor coags / fibrinogen 2x week, vitamin K as needed     12. Monitor closely for clinical changes, monitor for fevers     13. Emotional support provided, plan of care discussed and questions addressed     14. Patient education done regarding plan of care, restrictions and discharge planning     15. Continue regular social work input      I have written the above note for Dr. Logan who performed service with me in the room.   Janett Quezada NP-C ([phone])    I have seen and examined patient with NP, I agree with above note as scribed. HPC Transplant Team                                                      Critical / Counseling Time Provided: 30 minutes                                                                                                                                                        Chief Complaint: admitted for haplo-identical peripheral blood stem cell transplant   from son (4/8) for treatment of Ph + ALL     S: Patient seen and examined with HPC Transplant Team:   + constipation  Nausea much better    Denies mouth / tongue / throat pain, dyspnea, cough, vomiting, diarrhea    O: Vitals:   Vital Signs Last 24 Hrs  T(C): 36.3 (12 Feb 2019 04:49), Max: 37 (11 Feb 2019 13:56)  T(F): 97.4 (12 Feb 2019 04:49), Max: 98.6 (11 Feb 2019 13:56)  HR: 66 (12 Feb 2019 04:49) (66 - 76)  BP: 123/74 (12 Feb 2019 04:49) (116/76 - 145/84)  RR: 18 (12 Feb 2019 04:49) (18 - 18)  SpO2: 100% (11 Feb 2019 21:28) (98% - 100%)    Admit weight: 111.4 kg  Today's weight 111 kg    Intake / Output:   02-11 @ 07:01  -  02-12 @ 07:00  --------------------------------------------------------  IN: 1817.5 mL / OUT: 2700 mL / NET: -882.5 mL      PE:   Oropharynx: no erythema or ulcers  Oral Mucositis: n/a                                                       stGstrstastdstest:st st1st CVS: S1S2, RRR  Lungs: clear bilaterally  Abdomen: soft, NT/ND, normoactive BS x 4Q  Extremities: no edema  Gastric Mucositis: n/a                                                 stGstrstastdstest:st st1st Intestinal Mucositis: n/a                                             stGstrstastdstest:st st1st Skin: no rash  TLC: C/D/I  Neuro: nonfocal  Pain: none      Labs:     CBC Full  -  ( 12 Feb 2019 06:37 )  WBC Count : 1.4 K/uL  Hemoglobin : 10.8 g/dL  Hematocrit : 33.1 %  Platelet Count - Automated : 47 K/uL  Mean Cell Volume : 105.0 fl  Mean Cell Hemoglobin : 34.4 pg  Mean Cell Hemoglobin Concentration : 32.8 gm/dL  Auto Neutrophil # : x  Auto Lymphocyte # : x  Auto Monocyte # : x  Auto Eosinophil # : x  Auto Basophil # : x  Auto Neutrophil % : x  Auto Lymphocyte % : x  Auto Monocyte % : x  Auto Eosinophil % : x  Auto Basophil % : x                          10.8   1.4   )-----------( 47       ( 12 Feb 2019 06:37 )             33.1     02-12    140  |  108  |  12  ----------------------------<  90  4.4   |  23  |  0.86    Ca    10.1      12 Feb 2019 06:37  Phos  3.0     02-12  Mg     2.1     02-12    TPro  6.0  /  Alb  3.3  /  TBili  0.2  /  DBili  x   /  AST  31  /  ALT  46<H>  /  AlkPhos  221<H>  02-12    PT/INR - ( 11 Feb 2019 07:02 )   PT: 10.2 sec;   INR: 0.89 ratio    PTT - ( 11 Feb 2019 07:02 )  PTT:54.6 sec    LIVER FUNCTIONS - ( 12 Feb 2019 06:37 )  Alb: 3.3 g/dL / Pro: 6.0 g/dL / ALK PHOS: 221 U/L / ALT: 46 U/L / AST: 31 U/L / GGT: x           Lactate Dehydrogenase, Serum: 139 U/L (02-12 @ 06:37)      Cultures: no recent    Radiology:   from: CT Head No Cont (02.08.19 @ 08:53)   IMPRESSION:  No acute intracranial abnormality is noted. No subdural collections are   visualized. If the patient has new and persistent symptoms, consider   follow-up brain MRI with and without contrast, if there are no MRI or   contrast contraindications.  New moderate mucosal thickening involves the paranasal sinuses. Correlate   for possible sinusitis    Meds:   Antimicrobials:   acyclovir   Oral Tab/Cap 400 milliGRAM(s) Oral every 8 hours  ciprofloxacin     Tablet 500 milliGRAM(s) Oral every 12 hours  clotrimazole Lozenge 1 Lozenge Oral five times a day  fluconAZOLE   Tablet 400 milliGRAM(s) Oral daily      Heme / Onc:   heparin  Infusion 464 Unit(s)/Hr IV Continuous <Continuous>      GI:  docusate sodium 100 milliGRAM(s) Oral three times a day PRN  pantoprazole    Tablet 40 milliGRAM(s) Oral before breakfast  polyethylene glycol 3350 17 Gram(s) Oral daily PRN  senna 1 Tablet(s) Oral at bedtime PRN  sodium bicarbonate Mouth Rinse 10 milliLiter(s) Swish and Spit five times a day  ursodiol Capsule 300 milliGRAM(s) Oral two times a day with meals      Cardiovascular:   losartan 100 milliGRAM(s) Oral daily      Immunologic:   immune   globulin 10% (GAMMAGARD) IVPB 20 Gram(s) IV Intermittent <User Schedule>      Other medications:   acetaminophen   Tablet .. 650 milliGRAM(s) Oral every 6 hours  acetaminophen   Tablet .. 650 milliGRAM(s) Oral <User Schedule>  aprepitant 80 milliGRAM(s) Oral every 24 hours  aprepitant 80 milliGRAM(s) Oral daily  Biotene Dry Mouth Oral Rinse 5 milliLiter(s) Swish and Spit five times a day  diphenhydrAMINE   Injectable 25 milliGRAM(s) IV Push every 3 weeks  folic acid 1 milliGRAM(s) Oral daily  levETIRAcetam 500 milliGRAM(s) Oral two times a day  metoclopramide Injectable 10 milliGRAM(s) IV Push every 6 hours  multivitamin 1 Tablet(s) Oral daily  ondansetron Injectable 8 milliGRAM(s) IV Push every 8 hours  sodium chloride 0.45% 1000 milliLiter(s) IV Continuous <Continuous>  sodium chloride 0.45%. 1000 milliLiter(s) IV Continuous <Continuous>  sodium chloride 0.9%. 1000 milliLiter(s) IV Continuous <Continuous>      PRN:   acetaminophen   Tablet .. 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  Pre Allogeneic PBSCT day - 1  TBI today  s/p IVIG, fludarabine 5/5 yesterday   H/o subdural hematomas on Keppra in past, lost to follow-up (Neurologist is Dr. Headley).  Neurosurgery consult on 2/7, no need for Keppra because has been seizure-free.  Baseline CT Head non-con, no acute intracranial abnormality, possible sinusitis   Cough x 3 weeks, CXR clear, start Tessalon 100 mg PRN, RVP (-), start Cipro 500 mg BID  Chronic back pain- oxycodone 5 mg IR PRN  Still constipated - trial of dulcolax today + Miralax BID    1. Infectious Disease:   clotrimazole Lozenge 1 Lozenge Oral five times a day  fluconAZOLE   Tablet 400 milliGRAM(s) Oral daily  trimethoprim  160 mG/sulfamethoxazole 800 mG 1 Tablet(s) Oral every 12 hours  Cipro    2. VOD Prophylaxis: Actigall, Glutamine, Heparin (dosed at 100 units / kg / day)     3. GI Prophylaxis:  Protonix    4. Mouth care - NS / NaHCO3 rinses, Mycelex, Caphosol; Skin care     5. GVHD prophylaxis: MMF and Tacro to start on Day +5    6. Transfuse & replete electrolytes prn     7. IV hydration, daily weights, strict I&O, prn diuresis     8. PO intake as tolerated, nutrition follow up as needed, MVI, folic acid     9. Antiemetics, anti-diarrhea medications:   aprepitant 80 milliGRAM(s) Oral every 24 hours  ondansetron Injectable 8 milliGRAM(s) IV Push every 8 hours  metoclopramide Injectable 10 milliGRAM(s) IV Push every 6 hours PRN    10. OOB as tolerated, physical therapy consult if needed     11. Monitor coags / fibrinogen 2x week, vitamin K as needed     12. Monitor closely for clinical changes, monitor for fevers     13. Emotional support provided, plan of care discussed and questions addressed     14. Patient education done regarding plan of care, restrictions and discharge planning     15. Continue regular social work input      I have written the above note for Dr. Logan who performed service with me in the room.   Janett Quezada NP-C ([phone])    I have seen and examined patient with NP, I agree with above note as scribed. HPC Transplant Team                                                      Critical / Counseling Time Provided: 30 minutes                                                                                                                                                        Chief Complaint: admitted for haplo-identical peripheral blood stem cell transplant   from son (4/8) for treatment of Ph + ALL     S: Patient seen and examined with HPC Transplant Team:   + constipation  Nausea much better    Denies mouth / tongue / throat pain, dyspnea, cough, vomiting, diarrhea    O: Vitals:   Vital Signs Last 24 Hrs  T(C): 36.3 (12 Feb 2019 04:49), Max: 37 (11 Feb 2019 13:56)  T(F): 97.4 (12 Feb 2019 04:49), Max: 98.6 (11 Feb 2019 13:56)  HR: 66 (12 Feb 2019 04:49) (66 - 76)  BP: 123/74 (12 Feb 2019 04:49) (116/76 - 145/84)  RR: 18 (12 Feb 2019 04:49) (18 - 18)  SpO2: 100% (11 Feb 2019 21:28) (98% - 100%)    Admit weight: 111.4 kg  Today's weight 111 kg    Intake / Output:   02-11 @ 07:01  -  02-12 @ 07:00  --------------------------------------------------------  IN: 1817.5 mL / OUT: 2700 mL / NET: -882.5 mL      PE:   Oropharynx: no erythema or ulcers  Oral Mucositis: n/a                                                       stGstrstastdstest:st st1st CVS: S1S2, RRR  Lungs: clear bilaterally  Abdomen: soft, NT/ND, normoactive BS x 4Q  Extremities: no edema  Gastric Mucositis: n/a                                                 stGstrstastdstest:st st1st Intestinal Mucositis: n/a                                             stGstrstastdstest:st st1st Skin: no rash  TLC: C/D/I  Neuro: nonfocal  Pain: none      Labs:     CBC Full  -  ( 12 Feb 2019 06:37 )  WBC Count : 1.4 K/uL  Hemoglobin : 10.8 g/dL  Hematocrit : 33.1 %  Platelet Count - Automated : 47 K/uL  Mean Cell Volume : 105.0 fl  Mean Cell Hemoglobin : 34.4 pg  Mean Cell Hemoglobin Concentration : 32.8 gm/dL  Auto Neutrophil # : x  Auto Lymphocyte # : x  Auto Monocyte # : x  Auto Eosinophil # : x  Auto Basophil # : x  Auto Neutrophil % : x  Auto Lymphocyte % : x  Auto Monocyte % : x  Auto Eosinophil % : x  Auto Basophil % : x                          10.8   1.4   )-----------( 47       ( 12 Feb 2019 06:37 )             33.1     02-12    140  |  108  |  12  ----------------------------<  90  4.4   |  23  |  0.86    Ca    10.1      12 Feb 2019 06:37  Phos  3.0     02-12  Mg     2.1     02-12    TPro  6.0  /  Alb  3.3  /  TBili  0.2  /  DBili  x   /  AST  31  /  ALT  46<H>  /  AlkPhos  221<H>  02-12    PT/INR - ( 11 Feb 2019 07:02 )   PT: 10.2 sec;   INR: 0.89 ratio    PTT - ( 11 Feb 2019 07:02 )  PTT:54.6 sec    LIVER FUNCTIONS - ( 12 Feb 2019 06:37 )  Alb: 3.3 g/dL / Pro: 6.0 g/dL / ALK PHOS: 221 U/L / ALT: 46 U/L / AST: 31 U/L / GGT: x           Lactate Dehydrogenase, Serum: 139 U/L (02-12 @ 06:37)      Cultures: no recent    Radiology:   from: CT Head No Cont (02.08.19 @ 08:53)   IMPRESSION:  No acute intracranial abnormality is noted. No subdural collections are   visualized. If the patient has new and persistent symptoms, consider   follow-up brain MRI with and without contrast, if there are no MRI or   contrast contraindications.  New moderate mucosal thickening involves the paranasal sinuses. Correlate   for possible sinusitis    Meds:   Antimicrobials:   acyclovir   Oral Tab/Cap 400 milliGRAM(s) Oral every 8 hours  ciprofloxacin     Tablet 500 milliGRAM(s) Oral every 12 hours  clotrimazole Lozenge 1 Lozenge Oral five times a day  fluconAZOLE   Tablet 400 milliGRAM(s) Oral daily      Heme / Onc:   heparin  Infusion 464 Unit(s)/Hr IV Continuous <Continuous>      GI:  docusate sodium 100 milliGRAM(s) Oral three times a day PRN  pantoprazole    Tablet 40 milliGRAM(s) Oral before breakfast  polyethylene glycol 3350 17 Gram(s) Oral daily PRN  senna 1 Tablet(s) Oral at bedtime PRN  sodium bicarbonate Mouth Rinse 10 milliLiter(s) Swish and Spit five times a day  ursodiol Capsule 300 milliGRAM(s) Oral two times a day with meals      Cardiovascular:   losartan 100 milliGRAM(s) Oral daily      Immunologic:   immune   globulin 10% (GAMMAGARD) IVPB 20 Gram(s) IV Intermittent <User Schedule>      Other medications:   acetaminophen   Tablet .. 650 milliGRAM(s) Oral every 6 hours  acetaminophen   Tablet .. 650 milliGRAM(s) Oral <User Schedule>  aprepitant 80 milliGRAM(s) Oral every 24 hours  aprepitant 80 milliGRAM(s) Oral daily  Biotene Dry Mouth Oral Rinse 5 milliLiter(s) Swish and Spit five times a day  diphenhydrAMINE   Injectable 25 milliGRAM(s) IV Push every 3 weeks  folic acid 1 milliGRAM(s) Oral daily  levETIRAcetam 500 milliGRAM(s) Oral two times a day  metoclopramide Injectable 10 milliGRAM(s) IV Push every 6 hours  multivitamin 1 Tablet(s) Oral daily  ondansetron Injectable 8 milliGRAM(s) IV Push every 8 hours  sodium chloride 0.45% 1000 milliLiter(s) IV Continuous <Continuous>  sodium chloride 0.45%. 1000 milliLiter(s) IV Continuous <Continuous>  sodium chloride 0.9%. 1000 milliLiter(s) IV Continuous <Continuous>      PRN:   acetaminophen   Tablet .. 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  Pre Allogeneic PBSCT day - 1  TBI today  s/p IVIG, fludarabine 5/5 yesterday   H/o subdural hematomas on Keppra in past, lost to follow-up (Neurologist is Dr. Headley).  Neurosurgery consult on 2/7, no need for Keppra because has been seizure-free.  Baseline CT Head non-con, no acute intracranial abnormality, possible sinusitis   Cough x 3 weeks, CXR clear, start Tessalon 100 mg PRN, RVP (-), start Cipro 500 mg BID  Chronic back pain- oxycodone 5 mg IR PRN  Still constipated - trial of dulcolax today + Miralax BID    1. Infectious Disease:   clotrimazole Lozenge 1 Lozenge Oral five times a day  fluconAZOLE   Tablet 400 milliGRAM(s) Oral daily  trimethoprim  160 mG/sulfamethoxazole 800 mG 1 Tablet(s) Oral every 12 hours  Cipro    2. VOD Prophylaxis: Actigall, Glutamine, Heparin (dosed at 100 units / kg / day)     3. GI Prophylaxis:  Protonix    4. Mouth care - NS / NaHCO3 rinses, Mycelex, Caphosol; Skin care     5. GVHD prophylaxis: MMF and Tacro to start on Day +5    6. Transfuse & replete electrolytes prn     7. IV hydration, daily weights, strict I&O, prn diuresis     8. PO intake as tolerated, nutrition follow up as needed, MVI, folic acid     9. Antiemetics, anti-diarrhea medications:   aprepitant 80 milliGRAM(s) Oral every 24 hours  ondansetron Injectable 8 milliGRAM(s) IV Push every 8 hours  metoclopramide Injectable 10 milliGRAM(s) IV Push every 6 hours PRN    10. OOB as tolerated, physical therapy consult if needed     11. Monitor coags / fibrinogen 2x week, vitamin K as needed     12. Monitor closely for clinical changes, monitor for fevers     13. Emotional support provided, plan of care discussed and questions addressed     14. Patient education done regarding plan of care, restrictions and discharge planning     15. Continue regular social work input      I have written the above note for Dr. Logan who performed service with me in the room.   Janett Quezada NP-C ([phone])    I have seen and examined patient with NP, I agree with above note as scribed. HPC Transplant Team                                                      Critical / Counseling Time Provided: 30 minutes                                                                                                                                                        Chief Complaint: admitted for haplo-identical peripheral blood stem cell transplant   from son (4/8) for treatment of Ph + ALL     S: Patient seen and examined with HPC Transplant Team:   + constipation  Nausea much better    Denies mouth / tongue / throat pain, dyspnea, cough, vomiting, diarrhea    O: Vitals:   Vital Signs Last 24 Hrs  T(C): 36.3 (12 Feb 2019 04:49), Max: 37 (11 Feb 2019 13:56)  T(F): 97.4 (12 Feb 2019 04:49), Max: 98.6 (11 Feb 2019 13:56)  HR: 66 (12 Feb 2019 04:49) (66 - 76)  BP: 123/74 (12 Feb 2019 04:49) (116/76 - 145/84)  RR: 18 (12 Feb 2019 04:49) (18 - 18)  SpO2: 100% (11 Feb 2019 21:28) (98% - 100%)    Admit weight: 111.4 kg  Today's weight 111 kg    Intake / Output:   02-11 @ 07:01  -  02-12 @ 07:00  --------------------------------------------------------  IN: 1817.5 mL / OUT: 2700 mL / NET: -882.5 mL      PE:   Oropharynx: no erythema or ulcers  Oral Mucositis: n/a                                                       stGstrstastdstest:st st1st CVS: S1S2, RRR  Lungs: clear bilaterally  Abdomen: soft, NT/ND, normoactive BS x 4Q  Extremities: no edema  Gastric Mucositis: n/a                                                 stGstrstastdstest:st st1st Intestinal Mucositis: n/a                                             stGstrstastdstest:st st1st Skin: no rash  TLC: C/D/I  Neuro: nonfocal  Pain: none      Labs:     CBC Full  -  ( 12 Feb 2019 06:37 )  WBC Count : 1.4 K/uL  Hemoglobin : 10.8 g/dL  Hematocrit : 33.1 %  Platelet Count - Automated : 47 K/uL  Mean Cell Volume : 105.0 fl  Mean Cell Hemoglobin : 34.4 pg  Mean Cell Hemoglobin Concentration : 32.8 gm/dL  Auto Neutrophil # : x  Auto Lymphocyte # : x  Auto Monocyte # : x  Auto Eosinophil # : x  Auto Basophil # : x  Auto Neutrophil % : x  Auto Lymphocyte % : x  Auto Monocyte % : x  Auto Eosinophil % : x  Auto Basophil % : x                          10.8   1.4   )-----------( 47       ( 12 Feb 2019 06:37 )             33.1     02-12    140  |  108  |  12  ----------------------------<  90  4.4   |  23  |  0.86    Ca    10.1      12 Feb 2019 06:37  Phos  3.0     02-12  Mg     2.1     02-12    TPro  6.0  /  Alb  3.3  /  TBili  0.2  /  DBili  x   /  AST  31  /  ALT  46<H>  /  AlkPhos  221<H>  02-12    PT/INR - ( 11 Feb 2019 07:02 )   PT: 10.2 sec;   INR: 0.89 ratio    PTT - ( 11 Feb 2019 07:02 )  PTT:54.6 sec    LIVER FUNCTIONS - ( 12 Feb 2019 06:37 )  Alb: 3.3 g/dL / Pro: 6.0 g/dL / ALK PHOS: 221 U/L / ALT: 46 U/L / AST: 31 U/L / GGT: x           Lactate Dehydrogenase, Serum: 139 U/L (02-12 @ 06:37)      Cultures: no recent    Radiology:   from: CT Head No Cont (02.08.19 @ 08:53)   IMPRESSION:  No acute intracranial abnormality is noted. No subdural collections are   visualized. If the patient has new and persistent symptoms, consider   follow-up brain MRI with and without contrast, if there are no MRI or   contrast contraindications.  New moderate mucosal thickening involves the paranasal sinuses. Correlate   for possible sinusitis    Meds:   Antimicrobials:   acyclovir   Oral Tab/Cap 400 milliGRAM(s) Oral every 8 hours  ciprofloxacin     Tablet 500 milliGRAM(s) Oral every 12 hours  clotrimazole Lozenge 1 Lozenge Oral five times a day  fluconAZOLE   Tablet 400 milliGRAM(s) Oral daily      Heme / Onc:   heparin  Infusion 464 Unit(s)/Hr IV Continuous <Continuous>      GI:  docusate sodium 100 milliGRAM(s) Oral three times a day PRN  pantoprazole    Tablet 40 milliGRAM(s) Oral before breakfast  polyethylene glycol 3350 17 Gram(s) Oral daily PRN  senna 1 Tablet(s) Oral at bedtime PRN  sodium bicarbonate Mouth Rinse 10 milliLiter(s) Swish and Spit five times a day  ursodiol Capsule 300 milliGRAM(s) Oral two times a day with meals      Cardiovascular:   losartan 100 milliGRAM(s) Oral daily      Immunologic:   immune   globulin 10% (GAMMAGARD) IVPB 20 Gram(s) IV Intermittent <User Schedule>      Other medications:   acetaminophen   Tablet .. 650 milliGRAM(s) Oral every 6 hours  acetaminophen   Tablet .. 650 milliGRAM(s) Oral <User Schedule>  aprepitant 80 milliGRAM(s) Oral every 24 hours  aprepitant 80 milliGRAM(s) Oral daily  Biotene Dry Mouth Oral Rinse 5 milliLiter(s) Swish and Spit five times a day  diphenhydrAMINE   Injectable 25 milliGRAM(s) IV Push every 3 weeks  folic acid 1 milliGRAM(s) Oral daily  levETIRAcetam 500 milliGRAM(s) Oral two times a day  metoclopramide Injectable 10 milliGRAM(s) IV Push every 6 hours  multivitamin 1 Tablet(s) Oral daily  ondansetron Injectable 8 milliGRAM(s) IV Push every 8 hours  sodium chloride 0.45% 1000 milliLiter(s) IV Continuous <Continuous>  sodium chloride 0.45%. 1000 milliLiter(s) IV Continuous <Continuous>  sodium chloride 0.9%. 1000 milliLiter(s) IV Continuous <Continuous>      PRN:   acetaminophen   Tablet .. 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  Pre Allogeneic PBSCT day - 1  TBI today  s/p IVIG, fludarabine 5/5 yesterday   H/o subdural hematomas on Keppra in past, lost to follow-up (Neurologist is Dr. Headley).  Neurosurgery consult on 2/7, no need for Keppra because has been seizure-free.  Baseline CT Head non-con, no acute intracranial abnormality, possible sinusitis   Cough x 3 weeks, CXR clear, start Tessalon 100 mg PRN, RVP (-), start Cipro 500 mg BID  Chronic back pain- oxycodone 5 mg IR PRN  Still constipated - trial of dulcolax today + Miralax BID    1. Infectious Disease:   clotrimazole Lozenge 1 Lozenge Oral five times a day  fluconAZOLE   Tablet 400 milliGRAM(s) Oral daily  acyclovir   Oral Tab/Cap 400 milliGRAM(s) Oral every 8 hours  ciprofloxacin     Tablet 500 milliGRAM(s) Oral every 12 hours    2. VOD Prophylaxis: Actigall, Glutamine, Heparin (dosed at 100 units / kg / day)     3. GI Prophylaxis:  Protonix    4. Mouth care - NS / NaHCO3 rinses, Mycelex, Caphosol; Skin care     5. GVHD prophylaxis: MMF and Tacro to start on Day +5    6. Transfuse & replete electrolytes prn     7. IV hydration, daily weights, strict I&O, prn diuresis     8. PO intake as tolerated, nutrition follow up as needed, MVI, folic acid     9. Antiemetics, anti-diarrhea medications:   aprepitant 80 milliGRAM(s) Oral every 24 hours  ondansetron Injectable 8 milliGRAM(s) IV Push every 8 hours  metoclopramide Injectable 10 milliGRAM(s) IV Push every 6 hours PRN    10. OOB as tolerated, physical therapy consult if needed     11. Monitor coags / fibrinogen 2x week, vitamin K as needed     12. Monitor closely for clinical changes, monitor for fevers     13. Emotional support provided, plan of care discussed and questions addressed     14. Patient education done regarding plan of care, restrictions and discharge planning     15. Continue regular social work input      I have written the above note for Dr. Logan who performed service with me in the room.   Janett Quezada NP-C ([phone])    I have seen and examined patient with NP, I agree with above note as scribed.

## 2019-02-13 LAB
ALBUMIN SERPL ELPH-MCNC: 3.2 G/DL — LOW (ref 3.3–5)
ALP SERPL-CCNC: 200 U/L — HIGH (ref 40–120)
ALT FLD-CCNC: 41 U/L — SIGNIFICANT CHANGE UP (ref 10–45)
ANION GAP SERPL CALC-SCNC: 9 MMOL/L — SIGNIFICANT CHANGE UP (ref 5–17)
AST SERPL-CCNC: 30 U/L — SIGNIFICANT CHANGE UP (ref 10–40)
BASOPHILS # BLD AUTO: 0 K/UL — SIGNIFICANT CHANGE UP (ref 0–0.2)
BASOPHILS NFR BLD AUTO: 0 % — SIGNIFICANT CHANGE UP (ref 0–2)
BILIRUB DIRECT SERPL-MCNC: <0.1 MG/DL — SIGNIFICANT CHANGE UP (ref 0–0.2)
BILIRUB INDIRECT FLD-MCNC: >0.1 MG/DL — LOW (ref 0.2–1)
BILIRUB SERPL-MCNC: 0.2 MG/DL — SIGNIFICANT CHANGE UP (ref 0.2–1.2)
BLD GP AB SCN SERPL QL: NEGATIVE — SIGNIFICANT CHANGE UP
BUN SERPL-MCNC: 11 MG/DL — SIGNIFICANT CHANGE UP (ref 7–23)
CALCIUM SERPL-MCNC: 9.5 MG/DL — SIGNIFICANT CHANGE UP (ref 8.4–10.5)
CHLORIDE SERPL-SCNC: 107 MMOL/L — SIGNIFICANT CHANGE UP (ref 96–108)
CO2 SERPL-SCNC: 24 MMOL/L — SIGNIFICANT CHANGE UP (ref 22–31)
CREAT SERPL-MCNC: 0.76 MG/DL — SIGNIFICANT CHANGE UP (ref 0.5–1.3)
EOSINOPHIL # BLD AUTO: 0.1 K/UL — SIGNIFICANT CHANGE UP (ref 0–0.5)
EOSINOPHIL NFR BLD AUTO: 8.3 % — HIGH (ref 0–6)
GLUCOSE SERPL-MCNC: 91 MG/DL — SIGNIFICANT CHANGE UP (ref 70–99)
HCT VFR BLD CALC: 31.7 % — LOW (ref 34.5–45)
HGB BLD-MCNC: 10.6 G/DL — LOW (ref 11.5–15.5)
LDH SERPL L TO P-CCNC: 137 U/L — SIGNIFICANT CHANGE UP (ref 50–242)
LYMPHOCYTES # BLD AUTO: 0 K/UL — LOW (ref 1–3.3)
LYMPHOCYTES # BLD AUTO: 4.2 % — LOW (ref 13–44)
MAGNESIUM SERPL-MCNC: 2 MG/DL — SIGNIFICANT CHANGE UP (ref 1.6–2.6)
MCHC RBC-ENTMCNC: 33.4 GM/DL — SIGNIFICANT CHANGE UP (ref 32–36)
MCHC RBC-ENTMCNC: 34.8 PG — HIGH (ref 27–34)
MCV RBC AUTO: 104 FL — HIGH (ref 80–100)
MONOCYTES # BLD AUTO: 0 K/UL — SIGNIFICANT CHANGE UP (ref 0–0.9)
MONOCYTES NFR BLD AUTO: 1.1 % — LOW (ref 2–14)
NEUTROPHILS # BLD AUTO: 0.7 K/UL — LOW (ref 1.8–7.4)
NEUTROPHILS NFR BLD AUTO: 86.4 % — HIGH (ref 43–77)
PHOSPHATE SERPL-MCNC: 2.9 MG/DL — SIGNIFICANT CHANGE UP (ref 2.5–4.5)
PLAT MORPH BLD: NORMAL — SIGNIFICANT CHANGE UP
PLATELET # BLD AUTO: 42 K/UL — LOW (ref 150–400)
POTASSIUM SERPL-MCNC: 3.9 MMOL/L — SIGNIFICANT CHANGE UP (ref 3.5–5.3)
POTASSIUM SERPL-SCNC: 3.9 MMOL/L — SIGNIFICANT CHANGE UP (ref 3.5–5.3)
PROT SERPL-MCNC: 5.6 G/DL — LOW (ref 6–8.3)
RBC # BLD: 3.04 M/UL — LOW (ref 3.8–5.2)
RBC # FLD: 12.5 % — SIGNIFICANT CHANGE UP (ref 10.3–14.5)
RBC BLD AUTO: SIGNIFICANT CHANGE UP
RH IG SCN BLD-IMP: POSITIVE — SIGNIFICANT CHANGE UP
SODIUM SERPL-SCNC: 140 MMOL/L — SIGNIFICANT CHANGE UP (ref 135–145)
WBC # BLD: 0.8 K/UL — CRITICAL LOW (ref 3.8–10.5)
WBC # FLD AUTO: 0.8 K/UL — CRITICAL LOW (ref 3.8–10.5)

## 2019-02-13 PROCEDURE — 99291 CRITICAL CARE FIRST HOUR: CPT

## 2019-02-13 RX ORDER — PALIFERMIN 6.25 MG
6660 VIAL (EA) INTRAVENOUS EVERY 24 HOURS
Qty: 0 | Refills: 0 | Status: COMPLETED | OUTPATIENT
Start: 2019-02-13 | End: 2019-02-15

## 2019-02-13 RX ORDER — FUROSEMIDE 40 MG
40 TABLET ORAL ONCE
Qty: 0 | Refills: 0 | Status: COMPLETED | OUTPATIENT
Start: 2019-02-13 | End: 2019-02-13

## 2019-02-13 RX ADMIN — SODIUM CHLORIDE 150 MILLILITER(S): 9 INJECTION, SOLUTION INTRAVENOUS at 05:57

## 2019-02-13 RX ADMIN — Medication 400 MILLIGRAM(S): at 21:10

## 2019-02-13 RX ADMIN — Medication 100 MILLIGRAM(S): at 06:17

## 2019-02-13 RX ADMIN — LEVETIRACETAM 500 MILLIGRAM(S): 250 TABLET, FILM COATED ORAL at 05:54

## 2019-02-13 RX ADMIN — Medication 500 MILLIGRAM(S): at 11:55

## 2019-02-13 RX ADMIN — URSODIOL 300 MILLIGRAM(S): 250 TABLET, FILM COATED ORAL at 08:08

## 2019-02-13 RX ADMIN — APREPITANT 80 MILLIGRAM(S): 80 CAPSULE ORAL at 15:53

## 2019-02-13 RX ADMIN — ONDANSETRON 8 MILLIGRAM(S): 8 TABLET, FILM COATED ORAL at 09:53

## 2019-02-13 RX ADMIN — URSODIOL 300 MILLIGRAM(S): 250 TABLET, FILM COATED ORAL at 17:28

## 2019-02-13 RX ADMIN — Medication 1 LOZENGE: at 08:08

## 2019-02-13 RX ADMIN — Medication 5 MILLILITER(S): at 19:24

## 2019-02-13 RX ADMIN — Medication 400 MILLIGRAM(S): at 13:26

## 2019-02-13 RX ADMIN — Medication 1 LOZENGE: at 19:24

## 2019-02-13 RX ADMIN — Medication 40 MILLIGRAM(S): at 16:15

## 2019-02-13 RX ADMIN — SODIUM CHLORIDE 20 MILLILITER(S): 9 INJECTION INTRAMUSCULAR; INTRAVENOUS; SUBCUTANEOUS at 21:10

## 2019-02-13 RX ADMIN — Medication 10 MILLIGRAM(S): at 11:55

## 2019-02-13 RX ADMIN — Medication 10 MILLIGRAM(S): at 17:29

## 2019-02-13 RX ADMIN — Medication 1 LOZENGE: at 11:56

## 2019-02-13 RX ADMIN — Medication 650 MILLIGRAM(S): at 11:45

## 2019-02-13 RX ADMIN — HEPARIN SODIUM 4.64 UNIT(S)/HR: 5000 INJECTION INTRAVENOUS; SUBCUTANEOUS at 21:10

## 2019-02-13 RX ADMIN — Medication 5 MILLILITER(S): at 11:56

## 2019-02-13 RX ADMIN — Medication 10 MILLILITER(S): at 19:24

## 2019-02-13 RX ADMIN — Medication 1 MILLIGRAM(S): at 11:55

## 2019-02-13 RX ADMIN — ONDANSETRON 8 MILLIGRAM(S): 8 TABLET, FILM COATED ORAL at 01:58

## 2019-02-13 RX ADMIN — Medication 100 MILLIGRAM(S): at 01:52

## 2019-02-13 RX ADMIN — Medication 6660 MICROGRAM(S): at 17:27

## 2019-02-13 RX ADMIN — Medication 10 MILLILITER(S): at 15:54

## 2019-02-13 RX ADMIN — SODIUM CHLORIDE 20 MILLILITER(S): 9 INJECTION INTRAMUSCULAR; INTRAVENOUS; SUBCUTANEOUS at 06:23

## 2019-02-13 RX ADMIN — Medication 10 MILLIGRAM(S): at 05:55

## 2019-02-13 RX ADMIN — Medication 400 MILLIGRAM(S): at 05:53

## 2019-02-13 RX ADMIN — OXYCODONE HYDROCHLORIDE 5 MILLIGRAM(S): 5 TABLET ORAL at 20:04

## 2019-02-13 RX ADMIN — Medication 50 MILLIGRAM(S): at 11:54

## 2019-02-13 RX ADMIN — Medication 10 MILLILITER(S): at 08:09

## 2019-02-13 RX ADMIN — Medication 25 MILLIGRAM(S): at 11:54

## 2019-02-13 RX ADMIN — LEVETIRACETAM 500 MILLIGRAM(S): 250 TABLET, FILM COATED ORAL at 17:28

## 2019-02-13 RX ADMIN — Medication 10 MILLILITER(S): at 11:57

## 2019-02-13 RX ADMIN — SODIUM CHLORIDE 150 MILLILITER(S): 9 INJECTION, SOLUTION INTRAVENOUS at 20:50

## 2019-02-13 RX ADMIN — Medication 1 LOZENGE: at 15:46

## 2019-02-13 RX ADMIN — Medication 5 MILLILITER(S): at 08:09

## 2019-02-13 RX ADMIN — Medication 100 MILLIGRAM(S): at 21:09

## 2019-02-13 RX ADMIN — OXYCODONE HYDROCHLORIDE 5 MILLIGRAM(S): 5 TABLET ORAL at 19:34

## 2019-02-13 RX ADMIN — PANTOPRAZOLE SODIUM 40 MILLIGRAM(S): 20 TABLET, DELAYED RELEASE ORAL at 05:55

## 2019-02-13 RX ADMIN — Medication 1 TABLET(S): at 11:56

## 2019-02-13 RX ADMIN — FLUCONAZOLE 400 MILLIGRAM(S): 150 TABLET ORAL at 11:56

## 2019-02-13 RX ADMIN — LOSARTAN POTASSIUM 100 MILLIGRAM(S): 100 TABLET, FILM COATED ORAL at 05:54

## 2019-02-13 RX ADMIN — Medication 5 MILLILITER(S): at 15:46

## 2019-02-13 RX ADMIN — ONDANSETRON 8 MILLIGRAM(S): 8 TABLET, FILM COATED ORAL at 17:29

## 2019-02-13 NOTE — PROGRESS NOTE ADULT - ATTENDING COMMENTS
58 year old female with PMH [place] chromosome positive ALL diagnosed in 2016 s/p R HyperCVAD X 4 cycles, IT MTX X 2, s/p autologous stem cell transplantation (12/16/16),  who presented in October 2018 with subdural hemorrhage and relapsed disease confirmed by peripheral blood flow cytometry treated with Inotuzumab X 2 cycles.  Bone marrow biopsy on 1/23/19 showed remission with FISH and PCR for BCR-ABL translocation negative on the BM specimen but peripheral blood testing on 1/31 showed .004% BCR-ABL transcript with negative FISH suggesting MRD positivity.  Patient was on Ponatinib, DC 1/31/19. LFT's improved off drug. CR2    Patient was admitted for haploidentical stem cell transplantation with fludarabine/cytoxan/TBI conditioning. Day -1    Patient complaint of flu-like symptoms on admission, CXR was negative for pneumonia. Viral studies negative.     Continue Cipro, Acyclovir, Fluconazole prophylaxis.    History of SDH, continue Keppra. CT scan baseline 2/7/19 negative for SDH.     History of HTN continue Losartan.     VOD prophylaxis as per protocol. 58 year old female with PMH [place] chromosome positive ALL diagnosed in 2016 s/p R HyperCVAD X 4 cycles, IT MTX X 2, s/p autologous stem cell transplantation (12/16/16),  who presented in October 2018 with subdural hemorrhage and relapsed disease confirmed by peripheral blood flow cytometry treated with Inotuzumab X 2 cycles.  Bone marrow biopsy on 1/23/19 showed remission with FISH and PCR for BCR-ABL translocation negative on the BM specimen but peripheral blood testing on 1/31 showed .004% BCR-ABL transcript with negative FISH suggesting MRD positivity.  Patient was on Ponatinib, DC 1/31/19. LFT's improved off drug. CR2    Patient was admitted for haploidentical stem cell transplantation with fludarabine/cytoxan/TBI conditioning. For HPC transplant today, day 0    Patient complaint of flu-like symptoms on admission, CXR was negative for pneumonia. Viral studies negative.     Continue Cipro, Acyclovir, Fluconazole prophylaxis.    History of SDH, continue Keppra. CT scan baseline 2/7/19 negative for SDH.     History of HTN continue Losartan.     VOD prophylaxis as per protocol.

## 2019-02-13 NOTE — PROGRESS NOTE ADULT - SUBJECTIVE AND OBJECTIVE BOX
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     S: Patient seen and examined with HPC Transplant Team:     Denies mouth / tongue / throat pain, dyspnea, cough, nausea, vomiting, diarrhea, abdominal pain     O: Vitals:   Vital Signs Last 24 Hrs  T(C): 36 (13 Feb 2019 05:34), Max: 37.6 (12 Feb 2019 17:28)  T(F): 96.8 (13 Feb 2019 05:34), Max: 99.6 (12 Feb 2019 17:28)  HR: 63 (13 Feb 2019 05:34) (63 - 109)  BP: 111/72 (13 Feb 2019 05:34) (110/80 - 127/83)  BP(mean): --  RR: 18 (13 Feb 2019 05:34) (16 - 18)  SpO2: 98% (13 Feb 2019 05:34) (97% - 99%)    Admit weight: 111.4 kg  Today's weight:     Intake / Output:   02-12 @ 07:01  -  02-13 @ 07:00  --------------------------------------------------------  IN: 2479.4 mL / OUT: 3000 mL / NET: -520.7 mL      PE:   Oropharynx: no erythema or ulcers  Oral Mucositis: n/a                                                       stGstrstastdstest:st st1st CVS: S1S2, RRR  Lungs: clear bilaterally  Abdomen: soft, NT/ND, normoactive BS x 4Q  Extremities: no edema  Gastric Mucositis: n/a                                                 stGstrstastdstest:st st1st Intestinal Mucositis: n/a                                             stGstrstastdstest:st st1st Skin: no rash  TLC: C/D/I  Neuro: nonfocal  Pain: none    Karnofsky / Lansky Scale:   GVHD:   Skin:   Liver:   Gut:   Overall Grade:     Labs:   CBC Full  -  ( 13 Feb 2019 06:50 )  WBC Count : 0.8 K/uL  Hemoglobin : 10.6 g/dL  Hematocrit : 31.7 %  Platelet Count - Automated : 42 K/uL  Mean Cell Volume : 104.0 fl  Mean Cell Hemoglobin : 34.8 pg  Mean Cell Hemoglobin Concentration : 33.4 gm/dL  Auto Neutrophil # : 0.7 K/uL  Auto Lymphocyte # : 0.0 K/uL  Auto Monocyte # : 0.0 K/uL  Auto Eosinophil # : 0.1 K/uL  Auto Basophil # : 0.0 K/uL  Auto Neutrophil % : 86.4 %  Auto Lymphocyte % : 4.2 %  Auto Monocyte % : 1.1 %  Auto Eosinophil % : 8.3 %  Auto Basophil % : 0.0 %                          10.6   0.8   )-----------( 42       ( 13 Feb 2019 06:50 )             31.7     02-13    140  |  107  |  11  ----------------------------<  91  3.9   |  24  |  0.76    Ca    9.5      13 Feb 2019 06:50  Phos  2.9     02-13  Mg     2.0     02-13    TPro  5.6<L>  /  Alb  3.2<L>  /  TBili  0.2  /  DBili  <0.1  /  AST  30  /  ALT  41  /  AlkPhos  200<H>  02-13      LIVER FUNCTIONS - ( 13 Feb 2019 06:50 )  Alb: 3.2 g/dL / Pro: 5.6 g/dL / ALK PHOS: 200 U/L / ALT: 41 U/L / AST: 30 U/L / GGT: x           Lactate Dehydrogenase, Serum: 137 U/L (02-13 @ 06:50)    Cultures:   no cultures - will culture if febrile     Radiology:   EXAM:  CT BRAIN                        PROCEDURE DATE:  02/08/2019    IMPRESSION:  No acute intracranial abnormality is noted. No subdural collections are   visualized. If the patient has new and persistent symptoms, consider   follow-up brain MRI with and without contrast, if there are no MRI or   contrast contraindications.  New moderate mucosal thickening involves the paranasal sinuses. Correlate   for possible sinusitis    EXAM:  XR CHEST PA LAT 2V                        PROCEDURE DATE:  02/07/2019    IMPRESSION:  1.  Clear lungs        Meds:   Antimicrobials:   acyclovir   Oral Tab/Cap 400 milliGRAM(s) Oral every 8 hours  ciprofloxacin     Tablet 500 milliGRAM(s) Oral every 12 hours  clotrimazole Lozenge 1 Lozenge Oral five times a day  fluconAZOLE   Tablet 400 milliGRAM(s) Oral daily      Heme / Onc:   heparin  Infusion 464 Unit(s)/Hr IV Continuous <Continuous>      GI:  docusate sodium 100 milliGRAM(s) Oral three times a day PRN  pantoprazole    Tablet 40 milliGRAM(s) Oral before breakfast  polyethylene glycol 3350 17 Gram(s) Oral daily  senna 1 Tablet(s) Oral at bedtime PRN  sodium bicarbonate Mouth Rinse 10 milliLiter(s) Swish and Spit five times a day  ursodiol Capsule 300 milliGRAM(s) Oral two times a day with meals      Cardiovascular:   losartan 100 milliGRAM(s) Oral daily      Immunologic:   immune   globulin 10% (GAMMAGARD) IVPB 20 Gram(s) IV Intermittent <User Schedule>      Other medications:   acetaminophen   Tablet .. 650 milliGRAM(s) Oral every 6 hours  acetaminophen   Tablet .. 650 milliGRAM(s) Oral <User Schedule>  acetaminophen   Tablet .. 650 milliGRAM(s) Oral once  aprepitant 80 milliGRAM(s) Oral every 24 hours  Biotene Dry Mouth Oral Rinse 5 milliLiter(s) Swish and Spit five times a day  diphenhydrAMINE   Injectable 25 milliGRAM(s) IV Push once  diphenhydrAMINE   Injectable 25 milliGRAM(s) IV Push every 3 weeks  folic acid 1 milliGRAM(s) Oral daily  hydrocortisone sodium succinate Injectable 50 milliGRAM(s) IV Push once  levETIRAcetam 500 milliGRAM(s) Oral two times a day  metoclopramide Injectable 10 milliGRAM(s) IV Push every 6 hours  multivitamin 1 Tablet(s) Oral daily  ondansetron Injectable 8 milliGRAM(s) IV Push every 8 hours  sodium chloride 0.45% 1000 milliLiter(s) IV Continuous <Continuous>  sodium chloride 0.45%. 1000 milliLiter(s) IV Continuous <Continuous>  sodium chloride 0.9%. 1000 milliLiter(s) IV Continuous <Continuous>      PRN:   acetaminophen   Tablet .. 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  Pre Allogeneic PBSCT day 0   HPC transplant today - continue transplant hydration for 24 hours post infusion of cells   H/o subdural hematomas on Keppra in past, lost to follow-up (Neurologist is Dr. Headley).  Neurosurgery consult on 2/7, no need for Keppra because has been seizure-free.  Baseline CT Head non-con, no acute intracranial abnormality, possible sinusitis   Cough x 3 weeks, CXR clear, start Tessalon 100 mg PRN, RVP (-)  + UTI on admission - started on cipro   Chronic back pain- oxycodone 5 mg IR PRN    1. Infectious Disease:   acyclovir   Oral Tab/Cap 400 milliGRAM(s) Oral every 8 hours  ciprofloxacin     Tablet 500 milliGRAM(s) Oral every 12 hours  clotrimazole Lozenge 1 Lozenge Oral five times a day  fluconAZOLE   Tablet 400 milliGRAM(s) Oral daily    2. VOD Prophylaxis: Actigall, Glutamine, Heparin (dosed at 100 units / kg / day)     3. GI Prophylaxis:    pantoprazole    Tablet 40 milliGRAM(s) Oral before breakfast    4. Mouthcare - NS / NaHCO3 rinses, Mycelex, Caphosol; Skin care     5. GVHD prophylaxis   CTX days + 3, + 4   , MMF to start on day + 5     6. Transfuse & replete electrolytes prn     7. IV hydration, daily weights, strict I&O, prn diuresis     8. PO intake as tolerated, nutrition follow up as needed, MVI, folic acid     9. Antiemetics, anti-diarrhea medications:   ondansetron Injectable 8 milliGRAM(s) IV Push every 8 hours  LORazepam   Injectable 1 milliGRAM(s) IV Push every 6 hours PRN  metoclopramide Injectable 10 milliGRAM(s) IV Push every 6 hours  aprepitant 80 milliGRAM(s) Oral every 24 hours    10. OOB as tolerated, physical therapy consult if needed     11. Monitor coags / fibrinogen 2x week, vitamin K as needed     12. Monitor closely for clinical changes, monitor for fevers     13. Emotional support provided, plan of care discussed and questions addressed     14. Patient education done regarding plan of care, restrictions and discharge planning     15. Continue regular social work input     I have written the above note for Dr. Logan who performed service with me in the room.   Janett Barrow NP-C ([phone])    I have seen and examined patient with NP, I agree with above note as scribed. 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     S: Patient seen and examined with HPC Transplant Team:     Denies mouth / tongue / throat pain, dyspnea, cough, nausea, vomiting, diarrhea, abdominal pain     O: Vitals:   Vital Signs Last 24 Hrs  T(C): 36 (13 Feb 2019 05:34), Max: 37.6 (12 Feb 2019 17:28)  T(F): 96.8 (13 Feb 2019 05:34), Max: 99.6 (12 Feb 2019 17:28)  HR: 63 (13 Feb 2019 05:34) (63 - 109)  BP: 111/72 (13 Feb 2019 05:34) (110/80 - 127/83)  BP(mean): --  RR: 18 (13 Feb 2019 05:34) (16 - 18)  SpO2: 98% (13 Feb 2019 05:34) (97% - 99%)    Admit weight: 111.4 kg  Today's weight:     Intake / Output:   02-12 @ 07:01  -  02-13 @ 07:00  --------------------------------------------------------  IN: 2479.4 mL / OUT: 3000 mL / NET: -520.7 mL      PE:   Oropharynx: no erythema or ulcers  Oral Mucositis: n/a                                                       stGstrstastdstest:st st1st CVS: S1S2, RRR  Lungs: clear bilaterally  Abdomen: soft, NT/ND, normoactive BS x 4Q  Extremities: no edema  Gastric Mucositis: n/a                                                 stGstrstastdstest:st st1st Intestinal Mucositis: n/a                                             stGstrstastdstest:st st1st Skin: no rash  TLC: C/D/I  Neuro: nonfocal  Pain: none    Karnofsky / Lansky Scale: 60%  GVHD: will assess for acute GVHD post engraftment   Skin:   Liver:   Gut:   Overall Grade:     Labs:   CBC Full  -  ( 13 Feb 2019 06:50 )  WBC Count : 0.8 K/uL  Hemoglobin : 10.6 g/dL  Hematocrit : 31.7 %  Platelet Count - Automated : 42 K/uL  Mean Cell Volume : 104.0 fl  Mean Cell Hemoglobin : 34.8 pg  Mean Cell Hemoglobin Concentration : 33.4 gm/dL  Auto Neutrophil # : 0.7 K/uL  Auto Lymphocyte # : 0.0 K/uL  Auto Monocyte # : 0.0 K/uL  Auto Eosinophil # : 0.1 K/uL  Auto Basophil # : 0.0 K/uL  Auto Neutrophil % : 86.4 %  Auto Lymphocyte % : 4.2 %  Auto Monocyte % : 1.1 %  Auto Eosinophil % : 8.3 %  Auto Basophil % : 0.0 %                          10.6   0.8   )-----------( 42       ( 13 Feb 2019 06:50 )             31.7     02-13    140  |  107  |  11  ----------------------------<  91  3.9   |  24  |  0.76    Ca    9.5      13 Feb 2019 06:50  Phos  2.9     02-13  Mg     2.0     02-13    TPro  5.6<L>  /  Alb  3.2<L>  /  TBili  0.2  /  DBili  <0.1  /  AST  30  /  ALT  41  /  AlkPhos  200<H>  02-13      LIVER FUNCTIONS - ( 13 Feb 2019 06:50 )  Alb: 3.2 g/dL / Pro: 5.6 g/dL / ALK PHOS: 200 U/L / ALT: 41 U/L / AST: 30 U/L / GGT: x           Lactate Dehydrogenase, Serum: 137 U/L (02-13 @ 06:50)    Cultures:   no cultures - will culture if febrile     Radiology:   EXAM:  CT BRAIN                        PROCEDURE DATE:  02/08/2019    IMPRESSION:  No acute intracranial abnormality is noted. No subdural collections are   visualized. If the patient has new and persistent symptoms, consider   follow-up brain MRI with and without contrast, if there are no MRI or   contrast contraindications.  New moderate mucosal thickening involves the paranasal sinuses. Correlate   for possible sinusitis    EXAM:  XR CHEST PA LAT 2V                        PROCEDURE DATE:  02/07/2019    IMPRESSION:  1.  Clear lungs        Meds:   Antimicrobials:   acyclovir   Oral Tab/Cap 400 milliGRAM(s) Oral every 8 hours  ciprofloxacin     Tablet 500 milliGRAM(s) Oral every 12 hours  clotrimazole Lozenge 1 Lozenge Oral five times a day  fluconAZOLE   Tablet 400 milliGRAM(s) Oral daily      Heme / Onc:   heparin  Infusion 464 Unit(s)/Hr IV Continuous <Continuous>      GI:  docusate sodium 100 milliGRAM(s) Oral three times a day PRN  pantoprazole    Tablet 40 milliGRAM(s) Oral before breakfast  polyethylene glycol 3350 17 Gram(s) Oral daily  senna 1 Tablet(s) Oral at bedtime PRN  sodium bicarbonate Mouth Rinse 10 milliLiter(s) Swish and Spit five times a day  ursodiol Capsule 300 milliGRAM(s) Oral two times a day with meals      Cardiovascular:   losartan 100 milliGRAM(s) Oral daily      Immunologic:   immune   globulin 10% (GAMMAGARD) IVPB 20 Gram(s) IV Intermittent <User Schedule>      Other medications:   acetaminophen   Tablet .. 650 milliGRAM(s) Oral every 6 hours  acetaminophen   Tablet .. 650 milliGRAM(s) Oral <User Schedule>  acetaminophen   Tablet .. 650 milliGRAM(s) Oral once  aprepitant 80 milliGRAM(s) Oral every 24 hours  Biotene Dry Mouth Oral Rinse 5 milliLiter(s) Swish and Spit five times a day  diphenhydrAMINE   Injectable 25 milliGRAM(s) IV Push once  diphenhydrAMINE   Injectable 25 milliGRAM(s) IV Push every 3 weeks  folic acid 1 milliGRAM(s) Oral daily  hydrocortisone sodium succinate Injectable 50 milliGRAM(s) IV Push once  levETIRAcetam 500 milliGRAM(s) Oral two times a day  metoclopramide Injectable 10 milliGRAM(s) IV Push every 6 hours  multivitamin 1 Tablet(s) Oral daily  ondansetron Injectable 8 milliGRAM(s) IV Push every 8 hours  sodium chloride 0.45% 1000 milliLiter(s) IV Continuous <Continuous>  sodium chloride 0.45%. 1000 milliLiter(s) IV Continuous <Continuous>  sodium chloride 0.9%. 1000 milliLiter(s) IV Continuous <Continuous>      PRN:   acetaminophen   Tablet .. 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  Pre Allogeneic PBSCT day 0   HPC transplant today - continue transplant hydration for 24 hours post infusion of cells   H/o subdural hematomas on Keppra;   Baseline CT Head non-con, no acute intracranial abnormality, possible sinusitis   Cough x 3 weeks, CXR clear, start Tessalon 100 mg PRN, RVP (-)  + UTI on admission - started on cipro   Chronic back pain- oxycodone 5 mg IR PRN    1. Infectious Disease:   acyclovir   Oral Tab/Cap 400 milliGRAM(s) Oral every 8 hours  ciprofloxacin     Tablet 500 milliGRAM(s) Oral every 12 hours  clotrimazole Lozenge 1 Lozenge Oral five times a day  fluconAZOLE   Tablet 400 milliGRAM(s) Oral daily    2. VOD Prophylaxis: Actigall, Glutamine, Heparin (dosed at 100 units / kg / day)     3. GI Prophylaxis:    pantoprazole    Tablet 40 milliGRAM(s) Oral before breakfast    4. Mouthcare - NS / NaHCO3 rinses, Mycelex, Caphosol; Skin care     5. GVHD prophylaxis   CTX days + 3, + 4   , MMF to start on day + 5     6. Transfuse & replete electrolytes prn     7. IV hydration, daily weights, strict I&O, prn diuresis     8. PO intake as tolerated, nutrition follow up as needed, MVI, folic acid     9. Antiemetics, anti-diarrhea medications:   ondansetron Injectable 8 milliGRAM(s) IV Push every 8 hours  LORazepam   Injectable 1 milliGRAM(s) IV Push every 6 hours PRN  metoclopramide Injectable 10 milliGRAM(s) IV Push every 6 hours  aprepitant 80 milliGRAM(s) Oral every 24 hours    10. OOB as tolerated, physical therapy consult if needed     11. Monitor coags / fibrinogen 2x week, vitamin K as needed     12. Monitor closely for clinical changes, monitor for fevers     13. Emotional support provided, plan of care discussed and questions addressed     14. Patient education done regarding plan of care, restrictions and discharge planning     15. Continue regular social work input     I have written the above note for Dr. Logan who performed service with me in the room.   Janett Barrow NP-C ([phone])    I have seen and examined patient with NP, I agree with above note as scribed. 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     S: Patient seen and examined with HPC Transplant Team:   + fatigue   + nasal congestion   + cough   constipation resolved   Denies mouth / tongue / throat pain, dyspnea, nausea, vomiting, diarrhea, abdominal pain     O: Vitals:   Vital Signs Last 24 Hrs  T(C): 36 (13 Feb 2019 05:34), Max: 37.6 (12 Feb 2019 17:28)  T(F): 96.8 (13 Feb 2019 05:34), Max: 99.6 (12 Feb 2019 17:28)  HR: 63 (13 Feb 2019 05:34) (63 - 109)  BP: 111/72 (13 Feb 2019 05:34) (110/80 - 127/83)  BP(mean): --  RR: 18 (13 Feb 2019 05:34) (16 - 18)  SpO2: 98% (13 Feb 2019 05:34) (97% - 99%)    Admit weight: 111.4 kg  Today's weight: 112.2kg     Intake / Output:   02-12 @ 07:01  -  02-13 @ 07:00  --------------------------------------------------------  IN: 2479.4 mL / OUT: 3000 mL / NET: -520.7 mL      PE:   Oropharynx: no erythema or ulcers  Oral Mucositis: n/a                                                       stGstrstastdstest:st st1st CVS: S1S2, RRR  Lungs: clear bilaterally  Abdomen: soft, NT/ND, normoactive BS x 4Q  Extremities: no edema  Gastric Mucositis: n/a                                                 stGstrstastdstest:st st1st Intestinal Mucositis: n/a                                             stGstrstastdstest:st st1st Skin: no rash  TLC: C/D/I  Neuro: nonfocal  Pain: none    Karnofsky / Lansky Scale: 60%  GVHD: will assess for acute GVHD post engraftment   Skin:   Liver:   Gut:   Overall Grade:     Labs:   CBC Full  -  ( 13 Feb 2019 06:50 )  WBC Count : 0.8 K/uL  Hemoglobin : 10.6 g/dL  Hematocrit : 31.7 %  Platelet Count - Automated : 42 K/uL  Mean Cell Volume : 104.0 fl  Mean Cell Hemoglobin : 34.8 pg  Mean Cell Hemoglobin Concentration : 33.4 gm/dL  Auto Neutrophil # : 0.7 K/uL  Auto Lymphocyte # : 0.0 K/uL  Auto Monocyte # : 0.0 K/uL  Auto Eosinophil # : 0.1 K/uL  Auto Basophil # : 0.0 K/uL  Auto Neutrophil % : 86.4 %  Auto Lymphocyte % : 4.2 %  Auto Monocyte % : 1.1 %  Auto Eosinophil % : 8.3 %  Auto Basophil % : 0.0 %                          10.6   0.8   )-----------( 42       ( 13 Feb 2019 06:50 )             31.7     02-13    140  |  107  |  11  ----------------------------<  91  3.9   |  24  |  0.76    Ca    9.5      13 Feb 2019 06:50  Phos  2.9     02-13  Mg     2.0     02-13    TPro  5.6<L>  /  Alb  3.2<L>  /  TBili  0.2  /  DBili  <0.1  /  AST  30  /  ALT  41  /  AlkPhos  200<H>  02-13      LIVER FUNCTIONS - ( 13 Feb 2019 06:50 )  Alb: 3.2 g/dL / Pro: 5.6 g/dL / ALK PHOS: 200 U/L / ALT: 41 U/L / AST: 30 U/L / GGT: x           Lactate Dehydrogenase, Serum: 137 U/L (02-13 @ 06:50)    Cultures:   no cultures - will culture if febrile     Radiology:   EXAM:  CT BRAIN                        PROCEDURE DATE:  02/08/2019    IMPRESSION:  No acute intracranial abnormality is noted. No subdural collections are   visualized. If the patient has new and persistent symptoms, consider   follow-up brain MRI with and without contrast, if there are no MRI or   contrast contraindications.  New moderate mucosal thickening involves the paranasal sinuses. Correlate   for possible sinusitis    EXAM:  XR CHEST PA LAT 2V                        PROCEDURE DATE:  02/07/2019    IMPRESSION:  1.  Clear lungs        Meds:   Antimicrobials:   acyclovir   Oral Tab/Cap 400 milliGRAM(s) Oral every 8 hours  ciprofloxacin     Tablet 500 milliGRAM(s) Oral every 12 hours  clotrimazole Lozenge 1 Lozenge Oral five times a day  fluconAZOLE   Tablet 400 milliGRAM(s) Oral daily      Heme / Onc:   heparin  Infusion 464 Unit(s)/Hr IV Continuous <Continuous>      GI:  docusate sodium 100 milliGRAM(s) Oral three times a day PRN  pantoprazole    Tablet 40 milliGRAM(s) Oral before breakfast  polyethylene glycol 3350 17 Gram(s) Oral daily  senna 1 Tablet(s) Oral at bedtime PRN  sodium bicarbonate Mouth Rinse 10 milliLiter(s) Swish and Spit five times a day  ursodiol Capsule 300 milliGRAM(s) Oral two times a day with meals      Cardiovascular:   losartan 100 milliGRAM(s) Oral daily      Immunologic:   immune   globulin 10% (GAMMAGARD) IVPB 20 Gram(s) IV Intermittent <User Schedule>      Other medications:   acetaminophen   Tablet .. 650 milliGRAM(s) Oral every 6 hours  acetaminophen   Tablet .. 650 milliGRAM(s) Oral <User Schedule>  acetaminophen   Tablet .. 650 milliGRAM(s) Oral once  aprepitant 80 milliGRAM(s) Oral every 24 hours  Biotene Dry Mouth Oral Rinse 5 milliLiter(s) Swish and Spit five times a day  diphenhydrAMINE   Injectable 25 milliGRAM(s) IV Push once  diphenhydrAMINE   Injectable 25 milliGRAM(s) IV Push every 3 weeks  folic acid 1 milliGRAM(s) Oral daily  hydrocortisone sodium succinate Injectable 50 milliGRAM(s) IV Push once  levETIRAcetam 500 milliGRAM(s) Oral two times a day  metoclopramide Injectable 10 milliGRAM(s) IV Push every 6 hours  multivitamin 1 Tablet(s) Oral daily  ondansetron Injectable 8 milliGRAM(s) IV Push every 8 hours  sodium chloride 0.45% 1000 milliLiter(s) IV Continuous <Continuous>  sodium chloride 0.45%. 1000 milliLiter(s) IV Continuous <Continuous>  sodium chloride 0.9%. 1000 milliLiter(s) IV Continuous <Continuous>      PRN:   acetaminophen   Tablet .. 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  Pre Allogeneic PBSCT day 0   HPC transplant today - continue transplant hydration for 24 hours post infusion of cells   H/o subdural hematomas on Keppra;   Baseline CT Head non-con, no acute intracranial abnormality, possible sinusitis   Cough x 3 weeks, CXR clear, start Tessalon 100 mg PRN, RVP (-)  + UTI on admission - started on cipro   Chronic back pain- oxycodone 5 mg IR PRN    1. Infectious Disease:   acyclovir   Oral Tab/Cap 400 milliGRAM(s) Oral every 8 hours  ciprofloxacin     Tablet 500 milliGRAM(s) Oral every 12 hours  clotrimazole Lozenge 1 Lozenge Oral five times a day  fluconAZOLE   Tablet 400 milliGRAM(s) Oral daily    2. VOD Prophylaxis: Actigall, Glutamine, Heparin (dosed at 100 units / kg / day)     3. GI Prophylaxis:    pantoprazole    Tablet 40 milliGRAM(s) Oral before breakfast    4. Mouthcare - NS / NaHCO3 rinses, Mycelex, Caphosol; Skin care     5. GVHD prophylaxis   CTX days + 3, + 4   , MMF to start on day + 5     6. Transfuse & replete electrolytes prn     7. IV hydration, daily weights, strict I&O, prn diuresis     8. PO intake as tolerated, nutrition follow up as needed, MVI, folic acid     9. Antiemetics, anti-diarrhea medications:   ondansetron Injectable 8 milliGRAM(s) IV Push every 8 hours  LORazepam   Injectable 1 milliGRAM(s) IV Push every 6 hours PRN  metoclopramide Injectable 10 milliGRAM(s) IV Push every 6 hours  aprepitant 80 milliGRAM(s) Oral every 24 hours    10. OOB as tolerated, physical therapy consult if needed     11. Monitor coags / fibrinogen 2x week, vitamin K as needed     12. Monitor closely for clinical changes, monitor for fevers     13. Emotional support provided, plan of care discussed and questions addressed     14. Patient education done regarding plan of care, restrictions and discharge planning     15. Continue regular social work input     I have written the above note for Dr. Logan who performed service with me in the room.   Janett Barrow NP-C ([phone])    I have seen and examined patient with NP, I agree with above note as scribed. 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    S: Patient seen and examined with HPC Transplant Team:   + fatigue   + nasal congestion   + cough   constipation resolved   Denies mouth / tongue / throat pain, dyspnea, nausea, vomiting, diarrhea, abdominal pain     O: Vitals:   Vital Signs Last 24 Hrs  T(C): 36 (13 Feb 2019 05:34), Max: 37.6 (12 Feb 2019 17:28)  T(F): 96.8 (13 Feb 2019 05:34), Max: 99.6 (12 Feb 2019 17:28)  HR: 63 (13 Feb 2019 05:34) (63 - 109)  BP: 111/72 (13 Feb 2019 05:34) (110/80 - 127/83)  BP(mean): --  RR: 18 (13 Feb 2019 05:34) (16 - 18)  SpO2: 98% (13 Feb 2019 05:34) (97% - 99%)    Admit weight: 111.4 kg  Today's weight: 112.2kg     Intake / Output:   02-12 @ 07:01  -  02-13 @ 07:00  --------------------------------------------------------  IN: 2479.4 mL / OUT: 3000 mL / NET: -520.7 mL      PE:   Oropharynx: no erythema or ulcers  Oral Mucositis: n/a                                                       stGstrstastdstest:st st1st CVS: S1S2, RRR  Lungs: clear bilaterally  Abdomen: soft, NT/ND, normoactive BS x 4Q  Extremities: no edema  Gastric Mucositis: n/a                                                 stGstrstastdstest:st st1st Intestinal Mucositis: n/a                                             stGstrstastdstest:st st1st Skin: no rash  TLC: C/D/I  Neuro: nonfocal  Pain: none    Karnofsky / Lansky Scale: 60%  GVHD: will assess for acute GVHD post engraftment   Skin:   Liver:   Gut:   Overall Grade:     Labs:   CBC Full  -  ( 13 Feb 2019 06:50 )  WBC Count : 0.8 K/uL  Hemoglobin : 10.6 g/dL  Hematocrit : 31.7 %  Platelet Count - Automated : 42 K/uL  Mean Cell Volume : 104.0 fl  Mean Cell Hemoglobin : 34.8 pg  Mean Cell Hemoglobin Concentration : 33.4 gm/dL  Auto Neutrophil # : 0.7 K/uL  Auto Lymphocyte # : 0.0 K/uL  Auto Monocyte # : 0.0 K/uL  Auto Eosinophil # : 0.1 K/uL  Auto Basophil # : 0.0 K/uL  Auto Neutrophil % : 86.4 %  Auto Lymphocyte % : 4.2 %  Auto Monocyte % : 1.1 %  Auto Eosinophil % : 8.3 %  Auto Basophil % : 0.0 %                          10.6   0.8   )-----------( 42       ( 13 Feb 2019 06:50 )             31.7     02-13    140  |  107  |  11  ----------------------------<  91  3.9   |  24  |  0.76    Ca    9.5      13 Feb 2019 06:50  Phos  2.9     02-13  Mg     2.0     02-13    TPro  5.6<L>  /  Alb  3.2<L>  /  TBili  0.2  /  DBili  <0.1  /  AST  30  /  ALT  41  /  AlkPhos  200<H>  02-13      LIVER FUNCTIONS - ( 13 Feb 2019 06:50 )  Alb: 3.2 g/dL / Pro: 5.6 g/dL / ALK PHOS: 200 U/L / ALT: 41 U/L / AST: 30 U/L / GGT: x           Lactate Dehydrogenase, Serum: 137 U/L (02-13 @ 06:50)    Cultures:   no cultures - will culture if febrile     Radiology:   EXAM:  CT BRAIN                        PROCEDURE DATE:  02/08/2019    IMPRESSION:  No acute intracranial abnormality is noted. No subdural collections are   visualized. If the patient has new and persistent symptoms, consider   follow-up brain MRI with and without contrast, if there are no MRI or   contrast contraindications.  New moderate mucosal thickening involves the paranasal sinuses. Correlate   for possible sinusitis    EXAM:  XR CHEST PA LAT 2V                        PROCEDURE DATE:  02/07/2019    IMPRESSION:  1.  Clear lungs        Meds:   Antimicrobials:   acyclovir   Oral Tab/Cap 400 milliGRAM(s) Oral every 8 hours  ciprofloxacin     Tablet 500 milliGRAM(s) Oral every 12 hours  clotrimazole Lozenge 1 Lozenge Oral five times a day  fluconAZOLE   Tablet 400 milliGRAM(s) Oral daily      Heme / Onc:   heparin  Infusion 464 Unit(s)/Hr IV Continuous <Continuous>      GI:  docusate sodium 100 milliGRAM(s) Oral three times a day PRN  pantoprazole    Tablet 40 milliGRAM(s) Oral before breakfast  polyethylene glycol 3350 17 Gram(s) Oral daily  senna 1 Tablet(s) Oral at bedtime PRN  sodium bicarbonate Mouth Rinse 10 milliLiter(s) Swish and Spit five times a day  ursodiol Capsule 300 milliGRAM(s) Oral two times a day with meals      Cardiovascular:   losartan 100 milliGRAM(s) Oral daily      Immunologic:   immune   globulin 10% (GAMMAGARD) IVPB 20 Gram(s) IV Intermittent <User Schedule>      Other medications:   acetaminophen   Tablet .. 650 milliGRAM(s) Oral every 6 hours  acetaminophen   Tablet .. 650 milliGRAM(s) Oral <User Schedule>  acetaminophen   Tablet .. 650 milliGRAM(s) Oral once  aprepitant 80 milliGRAM(s) Oral every 24 hours  Biotene Dry Mouth Oral Rinse 5 milliLiter(s) Swish and Spit five times a day  diphenhydrAMINE   Injectable 25 milliGRAM(s) IV Push once  diphenhydrAMINE   Injectable 25 milliGRAM(s) IV Push every 3 weeks  folic acid 1 milliGRAM(s) Oral daily  hydrocortisone sodium succinate Injectable 50 milliGRAM(s) IV Push once  levETIRAcetam 500 milliGRAM(s) Oral two times a day  metoclopramide Injectable 10 milliGRAM(s) IV Push every 6 hours  multivitamin 1 Tablet(s) Oral daily  ondansetron Injectable 8 milliGRAM(s) IV Push every 8 hours  sodium chloride 0.45% 1000 milliLiter(s) IV Continuous <Continuous>  sodium chloride 0.45%. 1000 milliLiter(s) IV Continuous <Continuous>  sodium chloride 0.9%. 1000 milliLiter(s) IV Continuous <Continuous>      PRN:   acetaminophen   Tablet .. 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  Pre Allogeneic PBSCT day 0   HPC transplant today - continue transplant hydration for 24 hours post infusion of cells   H/o subdural hematomas on Keppra;   Baseline CT Head non-con, no acute intracranial abnormality, possible sinusitis   Cough x 3 weeks, CXR clear, start Tessalon 100 mg PRN, RVP (-)  + UTI on admission - started on cipro   Chronic back pain- oxycodone 5 mg IR PRN    1. Infectious Disease:   acyclovir   Oral Tab/Cap 400 milliGRAM(s) Oral every 8 hours  ciprofloxacin     Tablet 500 milliGRAM(s) Oral every 12 hours  clotrimazole Lozenge 1 Lozenge Oral five times a day  fluconAZOLE   Tablet 400 milliGRAM(s) Oral daily    2. VOD Prophylaxis: Actigall, Glutamine, Heparin (dosed at 100 units / kg / day)     3. GI Prophylaxis:    pantoprazole    Tablet 40 milliGRAM(s) Oral before breakfast    4. Mouthcare - NS / NaHCO3 rinses, Mycelex, Caphosol; Skin care     5. GVHD prophylaxis   CTX days + 3, + 4   , MMF to start on day + 5     6. Transfuse & replete electrolytes prn     7. IV hydration, daily weights, strict I&O, prn diuresis     8. PO intake as tolerated, nutrition follow up as needed, MVI, folic acid     9. Antiemetics, anti-diarrhea medications:   ondansetron Injectable 8 milliGRAM(s) IV Push every 8 hours  LORazepam   Injectable 1 milliGRAM(s) IV Push every 6 hours PRN  metoclopramide Injectable 10 milliGRAM(s) IV Push every 6 hours  aprepitant 80 milliGRAM(s) Oral every 24 hours    10. OOB as tolerated, physical therapy consult if needed     11. Monitor coags / fibrinogen 2x week, vitamin K as needed     12. Monitor closely for clinical changes, monitor for fevers     13. Emotional support provided, plan of care discussed and questions addressed     14. Patient education done regarding plan of care, restrictions and discharge planning     15. Continue regular social work input     I have written the above note for Dr. Logan who performed service with me in the room.   Janett Barrow NP-C ([phone])    I have seen and examined patient with NP, I agree with above note as scribed.

## 2019-02-13 NOTE — CHART NOTE - NSCHARTNOTEFT_GEN_A_CORE
After premedication, infused 280 mLs of fresh, apheresed, mobilized, haplo allogeneic HPC over 57 minutes.  Total MNCs ( x 10^8/kg): 4.36  CD 34+Cells ( x 10^6/kg): 7.78  CD3+ Cells ( x 10^7/kg): 14.77  Cell Viability (%): 100    Pt tolerated infusion with no adverse reactions or complaints.  Monitored closely.

## 2019-02-14 LAB
ALBUMIN SERPL ELPH-MCNC: 3.4 G/DL — SIGNIFICANT CHANGE UP (ref 3.3–5)
ALP SERPL-CCNC: 213 U/L — HIGH (ref 40–120)
ALT FLD-CCNC: 51 U/L — HIGH (ref 10–45)
ANION GAP SERPL CALC-SCNC: 12 MMOL/L — SIGNIFICANT CHANGE UP (ref 5–17)
APPEARANCE UR: CLEAR — SIGNIFICANT CHANGE UP
APTT BLD: 44.1 SEC — HIGH (ref 27.5–36.3)
AST SERPL-CCNC: 45 U/L — HIGH (ref 10–40)
BACTERIA # UR AUTO: NEGATIVE — SIGNIFICANT CHANGE UP
BASOPHILS # BLD AUTO: 0 K/UL — SIGNIFICANT CHANGE UP (ref 0–0.2)
BASOPHILS NFR BLD AUTO: 0 % — SIGNIFICANT CHANGE UP (ref 0–2)
BILIRUB SERPL-MCNC: 0.2 MG/DL — SIGNIFICANT CHANGE UP (ref 0.2–1.2)
BILIRUB UR-MCNC: NEGATIVE — SIGNIFICANT CHANGE UP
BUN SERPL-MCNC: 12 MG/DL — SIGNIFICANT CHANGE UP (ref 7–23)
CALCIUM SERPL-MCNC: 9.4 MG/DL — SIGNIFICANT CHANGE UP (ref 8.4–10.5)
CHLORIDE SERPL-SCNC: 102 MMOL/L — SIGNIFICANT CHANGE UP (ref 96–108)
CO2 SERPL-SCNC: 27 MMOL/L — SIGNIFICANT CHANGE UP (ref 22–31)
COLOR SPEC: SIGNIFICANT CHANGE UP
CREAT SERPL-MCNC: 0.76 MG/DL — SIGNIFICANT CHANGE UP (ref 0.5–1.3)
DIFF PNL FLD: NEGATIVE — SIGNIFICANT CHANGE UP
EOSINOPHIL # BLD AUTO: 0.1 K/UL — SIGNIFICANT CHANGE UP (ref 0–0.5)
EOSINOPHIL NFR BLD AUTO: 6.5 % — HIGH (ref 0–6)
EPI CELLS # UR: 1 /HPF — SIGNIFICANT CHANGE UP
FIBRINOGEN PPP-MCNC: 609 MG/DL — HIGH (ref 350–510)
GLUCOSE SERPL-MCNC: 87 MG/DL — SIGNIFICANT CHANGE UP (ref 70–99)
GLUCOSE UR QL: NEGATIVE — SIGNIFICANT CHANGE UP
HCT VFR BLD CALC: 34.3 % — LOW (ref 34.5–45)
HGB BLD-MCNC: 11.4 G/DL — LOW (ref 11.5–15.5)
HYALINE CASTS # UR AUTO: 0 /LPF — SIGNIFICANT CHANGE UP (ref 0–2)
INR BLD: 0.86 RATIO — LOW (ref 0.88–1.16)
KETONES UR-MCNC: NEGATIVE — SIGNIFICANT CHANGE UP
LDH SERPL L TO P-CCNC: 165 U/L — SIGNIFICANT CHANGE UP (ref 50–242)
LEUKOCYTE ESTERASE UR-ACNC: ABNORMAL
LYMPHOCYTES # BLD AUTO: 0.1 K/UL — LOW (ref 1–3.3)
LYMPHOCYTES # BLD AUTO: 4.4 % — LOW (ref 13–44)
MAGNESIUM SERPL-MCNC: 1.8 MG/DL — SIGNIFICANT CHANGE UP (ref 1.6–2.6)
MCHC RBC-ENTMCNC: 33.2 GM/DL — SIGNIFICANT CHANGE UP (ref 32–36)
MCHC RBC-ENTMCNC: 34.6 PG — HIGH (ref 27–34)
MCV RBC AUTO: 104 FL — HIGH (ref 80–100)
MONOCYTES # BLD AUTO: 0 K/UL — SIGNIFICANT CHANGE UP (ref 0–0.9)
MONOCYTES NFR BLD AUTO: 3.6 % — SIGNIFICANT CHANGE UP (ref 2–14)
NEUTROPHILS # BLD AUTO: 1.2 K/UL — LOW (ref 1.8–7.4)
NEUTROPHILS NFR BLD AUTO: 85.5 % — HIGH (ref 43–77)
NITRITE UR-MCNC: NEGATIVE — SIGNIFICANT CHANGE UP
PH UR: 7.5 — SIGNIFICANT CHANGE UP (ref 5–8)
PHOSPHATE SERPL-MCNC: 2.9 MG/DL — SIGNIFICANT CHANGE UP (ref 2.5–4.5)
PLAT MORPH BLD: NORMAL — SIGNIFICANT CHANGE UP
PLATELET # BLD AUTO: 52 K/UL — LOW (ref 150–400)
POTASSIUM SERPL-MCNC: 3.5 MMOL/L — SIGNIFICANT CHANGE UP (ref 3.5–5.3)
POTASSIUM SERPL-SCNC: 3.5 MMOL/L — SIGNIFICANT CHANGE UP (ref 3.5–5.3)
PROT SERPL-MCNC: 6 G/DL — SIGNIFICANT CHANGE UP (ref 6–8.3)
PROT UR-MCNC: NEGATIVE — SIGNIFICANT CHANGE UP
PROTHROM AB SERPL-ACNC: 9.8 SEC — LOW (ref 10–12.9)
RBC # BLD: 3.3 M/UL — LOW (ref 3.8–5.2)
RBC # FLD: 12.5 % — SIGNIFICANT CHANGE UP (ref 10.3–14.5)
RBC BLD AUTO: SIGNIFICANT CHANGE UP
RBC CASTS # UR COMP ASSIST: 2 /HPF — SIGNIFICANT CHANGE UP (ref 0–4)
SODIUM SERPL-SCNC: 141 MMOL/L — SIGNIFICANT CHANGE UP (ref 135–145)
SP GR SPEC: 1.01 — SIGNIFICANT CHANGE UP (ref 1.01–1.02)
UROBILINOGEN FLD QL: NEGATIVE — SIGNIFICANT CHANGE UP
WBC # BLD: 1.4 K/UL — LOW (ref 3.8–10.5)
WBC # FLD AUTO: 1.4 K/UL — LOW (ref 3.8–10.5)
WBC UR QL: 16 /HPF — HIGH (ref 0–5)

## 2019-02-14 PROCEDURE — 99291 CRITICAL CARE FIRST HOUR: CPT

## 2019-02-14 PROCEDURE — 71045 X-RAY EXAM CHEST 1 VIEW: CPT | Mod: 26

## 2019-02-14 RX ORDER — ACETAMINOPHEN 500 MG
1000 TABLET ORAL ONCE
Qty: 0 | Refills: 0 | Status: COMPLETED | OUTPATIENT
Start: 2019-02-14 | End: 2019-02-14

## 2019-02-14 RX ORDER — FUROSEMIDE 40 MG
40 TABLET ORAL ONCE
Qty: 0 | Refills: 0 | Status: COMPLETED | OUTPATIENT
Start: 2019-02-14 | End: 2019-02-14

## 2019-02-14 RX ORDER — HYDROMORPHONE HYDROCHLORIDE 2 MG/ML
0.5 INJECTION INTRAMUSCULAR; INTRAVENOUS; SUBCUTANEOUS ONCE
Qty: 0 | Refills: 0 | Status: DISCONTINUED | OUTPATIENT
Start: 2019-02-14 | End: 2019-02-14

## 2019-02-14 RX ORDER — CEFEPIME 1 G/1
2000 INJECTION, POWDER, FOR SOLUTION INTRAMUSCULAR; INTRAVENOUS ONCE
Qty: 0 | Refills: 0 | Status: COMPLETED | OUTPATIENT
Start: 2019-02-14 | End: 2019-02-14

## 2019-02-14 RX ORDER — POTASSIUM CHLORIDE 20 MEQ
20 PACKET (EA) ORAL
Qty: 0 | Refills: 0 | Status: COMPLETED | OUTPATIENT
Start: 2019-02-14 | End: 2019-02-14

## 2019-02-14 RX ORDER — CEFEPIME 1 G/1
INJECTION, POWDER, FOR SOLUTION INTRAMUSCULAR; INTRAVENOUS
Qty: 0 | Refills: 0 | Status: DISCONTINUED | OUTPATIENT
Start: 2019-02-14 | End: 2019-02-28

## 2019-02-14 RX ORDER — HYDROMORPHONE HYDROCHLORIDE 2 MG/ML
0.5 INJECTION INTRAMUSCULAR; INTRAVENOUS; SUBCUTANEOUS EVERY 4 HOURS
Qty: 0 | Refills: 0 | Status: DISCONTINUED | OUTPATIENT
Start: 2019-02-14 | End: 2019-02-21

## 2019-02-14 RX ORDER — CEFEPIME 1 G/1
2000 INJECTION, POWDER, FOR SOLUTION INTRAMUSCULAR; INTRAVENOUS EVERY 8 HOURS
Qty: 0 | Refills: 0 | Status: DISCONTINUED | OUTPATIENT
Start: 2019-02-14 | End: 2019-02-28

## 2019-02-14 RX ADMIN — Medication 5 MILLILITER(S): at 11:30

## 2019-02-14 RX ADMIN — HYDROMORPHONE HYDROCHLORIDE 0.5 MILLIGRAM(S): 2 INJECTION INTRAMUSCULAR; INTRAVENOUS; SUBCUTANEOUS at 21:49

## 2019-02-14 RX ADMIN — CEFEPIME 100 MILLIGRAM(S): 1 INJECTION, POWDER, FOR SOLUTION INTRAMUSCULAR; INTRAVENOUS at 08:54

## 2019-02-14 RX ADMIN — HYDROMORPHONE HYDROCHLORIDE 0.5 MILLIGRAM(S): 2 INJECTION INTRAMUSCULAR; INTRAVENOUS; SUBCUTANEOUS at 00:43

## 2019-02-14 RX ADMIN — Medication 10 MILLIGRAM(S): at 00:16

## 2019-02-14 RX ADMIN — Medication 5 MILLILITER(S): at 00:15

## 2019-02-14 RX ADMIN — Medication 10 MILLIGRAM(S): at 11:29

## 2019-02-14 RX ADMIN — LEVETIRACETAM 500 MILLIGRAM(S): 250 TABLET, FILM COATED ORAL at 16:57

## 2019-02-14 RX ADMIN — Medication 6660 MICROGRAM(S): at 15:49

## 2019-02-14 RX ADMIN — Medication 5 MILLILITER(S): at 08:06

## 2019-02-14 RX ADMIN — Medication 10 MILLILITER(S): at 20:15

## 2019-02-14 RX ADMIN — HYDROMORPHONE HYDROCHLORIDE 0.5 MILLIGRAM(S): 2 INJECTION INTRAMUSCULAR; INTRAVENOUS; SUBCUTANEOUS at 11:30

## 2019-02-14 RX ADMIN — SODIUM CHLORIDE 20 MILLILITER(S): 9 INJECTION INTRAMUSCULAR; INTRAVENOUS; SUBCUTANEOUS at 23:37

## 2019-02-14 RX ADMIN — HYDROMORPHONE HYDROCHLORIDE 0.5 MILLIGRAM(S): 2 INJECTION INTRAMUSCULAR; INTRAVENOUS; SUBCUTANEOUS at 22:14

## 2019-02-14 RX ADMIN — Medication 400 MILLIGRAM(S): at 21:27

## 2019-02-14 RX ADMIN — URSODIOL 300 MILLIGRAM(S): 250 TABLET, FILM COATED ORAL at 16:58

## 2019-02-14 RX ADMIN — Medication 10 MILLILITER(S): at 12:06

## 2019-02-14 RX ADMIN — HEPARIN SODIUM 4.64 UNIT(S)/HR: 5000 INJECTION INTRAVENOUS; SUBCUTANEOUS at 23:36

## 2019-02-14 RX ADMIN — ONDANSETRON 8 MILLIGRAM(S): 8 TABLET, FILM COATED ORAL at 02:59

## 2019-02-14 RX ADMIN — Medication 400 MILLIGRAM(S): at 13:24

## 2019-02-14 RX ADMIN — ONDANSETRON 8 MILLIGRAM(S): 8 TABLET, FILM COATED ORAL at 16:58

## 2019-02-14 RX ADMIN — Medication 10 MILLIGRAM(S): at 05:02

## 2019-02-14 RX ADMIN — HYDROMORPHONE HYDROCHLORIDE 0.5 MILLIGRAM(S): 2 INJECTION INTRAMUSCULAR; INTRAVENOUS; SUBCUTANEOUS at 11:15

## 2019-02-14 RX ADMIN — Medication 650 MILLIGRAM(S): at 08:30

## 2019-02-14 RX ADMIN — Medication 100 MILLIGRAM(S): at 21:27

## 2019-02-14 RX ADMIN — Medication 400 MILLIGRAM(S): at 05:02

## 2019-02-14 RX ADMIN — Medication 50 MILLIEQUIVALENT(S): at 13:23

## 2019-02-14 RX ADMIN — APREPITANT 80 MILLIGRAM(S): 80 CAPSULE ORAL at 15:48

## 2019-02-14 RX ADMIN — FLUCONAZOLE 400 MILLIGRAM(S): 150 TABLET ORAL at 11:30

## 2019-02-14 RX ADMIN — Medication 1 LOZENGE: at 08:05

## 2019-02-14 RX ADMIN — Medication 5 MILLILITER(S): at 15:05

## 2019-02-14 RX ADMIN — CEFEPIME 100 MILLIGRAM(S): 1 INJECTION, POWDER, FOR SOLUTION INTRAMUSCULAR; INTRAVENOUS at 13:23

## 2019-02-14 RX ADMIN — ONDANSETRON 8 MILLIGRAM(S): 8 TABLET, FILM COATED ORAL at 10:26

## 2019-02-14 RX ADMIN — Medication 10 MILLIGRAM(S): at 23:37

## 2019-02-14 RX ADMIN — Medication 10 MILLIGRAM(S): at 16:58

## 2019-02-14 RX ADMIN — Medication 5 MILLILITER(S): at 23:36

## 2019-02-14 RX ADMIN — Medication 100 MILLIGRAM(S): at 05:04

## 2019-02-14 RX ADMIN — URSODIOL 300 MILLIGRAM(S): 250 TABLET, FILM COATED ORAL at 08:05

## 2019-02-14 RX ADMIN — LOSARTAN POTASSIUM 100 MILLIGRAM(S): 100 TABLET, FILM COATED ORAL at 05:01

## 2019-02-14 RX ADMIN — Medication 10 MILLILITER(S): at 15:47

## 2019-02-14 RX ADMIN — Medication 1 MILLIGRAM(S): at 11:29

## 2019-02-14 RX ADMIN — Medication 400 MILLIGRAM(S): at 22:09

## 2019-02-14 RX ADMIN — Medication 1 LOZENGE: at 20:15

## 2019-02-14 RX ADMIN — HYDROMORPHONE HYDROCHLORIDE 0.5 MILLIGRAM(S): 2 INJECTION INTRAMUSCULAR; INTRAVENOUS; SUBCUTANEOUS at 01:13

## 2019-02-14 RX ADMIN — Medication 1 LOZENGE: at 11:30

## 2019-02-14 RX ADMIN — Medication 1 LOZENGE: at 15:05

## 2019-02-14 RX ADMIN — Medication 500 MILLIGRAM(S): at 00:15

## 2019-02-14 RX ADMIN — Medication 100 MILLIGRAM(S): at 13:22

## 2019-02-14 RX ADMIN — HYDROMORPHONE HYDROCHLORIDE 0.5 MILLIGRAM(S): 2 INJECTION INTRAMUSCULAR; INTRAVENOUS; SUBCUTANEOUS at 17:20

## 2019-02-14 RX ADMIN — Medication 10 MILLILITER(S): at 08:07

## 2019-02-14 RX ADMIN — Medication 1 LOZENGE: at 00:15

## 2019-02-14 RX ADMIN — Medication 650 MILLIGRAM(S): at 15:00

## 2019-02-14 RX ADMIN — Medication 40 MILLIGRAM(S): at 11:27

## 2019-02-14 RX ADMIN — Medication 5 MILLILITER(S): at 20:15

## 2019-02-14 RX ADMIN — Medication 1 TABLET(S): at 11:30

## 2019-02-14 RX ADMIN — Medication 1 LOZENGE: at 23:36

## 2019-02-14 RX ADMIN — Medication 10 MILLILITER(S): at 00:15

## 2019-02-14 RX ADMIN — Medication 50 MILLIEQUIVALENT(S): at 11:28

## 2019-02-14 RX ADMIN — SODIUM CHLORIDE 150 MILLILITER(S): 9 INJECTION, SOLUTION INTRAVENOUS at 03:34

## 2019-02-14 RX ADMIN — CEFEPIME 100 MILLIGRAM(S): 1 INJECTION, POWDER, FOR SOLUTION INTRAMUSCULAR; INTRAVENOUS at 21:28

## 2019-02-14 RX ADMIN — HYDROMORPHONE HYDROCHLORIDE 0.5 MILLIGRAM(S): 2 INJECTION INTRAMUSCULAR; INTRAVENOUS; SUBCUTANEOUS at 17:06

## 2019-02-14 RX ADMIN — PANTOPRAZOLE SODIUM 40 MILLIGRAM(S): 20 TABLET, DELAYED RELEASE ORAL at 05:01

## 2019-02-14 RX ADMIN — Medication 1000 MILLIGRAM(S): at 22:55

## 2019-02-14 RX ADMIN — Medication 10 MILLILITER(S): at 23:36

## 2019-02-14 RX ADMIN — LEVETIRACETAM 500 MILLIGRAM(S): 250 TABLET, FILM COATED ORAL at 05:01

## 2019-02-14 NOTE — CHART NOTE - NSCHARTNOTEFT_GEN_A_CORE
Pt seen for nutrition follow up. Pt with Ph+ ALL S/P autologous PBSCT 2016, pt with relapsed disease now day +1 S/P haploidentical PBSCT.    Source: Patient [x ]    Family [ ]     other [ ]    Diet : Regular diet with glutasolve 1 packet daily       Patient denies N+V, endorses previous constipation which has resolved yesterday, pt had BM today as well.      PO intake:  < 50% [ ] 50-75% [ ]   % [ x]  other : Pt reports eating well with good appetite consuming >75% of meals dependent on selection, pt had 100% of fajita yesterday for lunch, did not enjoy lunch today and instead had ice cream and cheesecake. Pt regularly takes milkshakes though [employer] program, completed 2 shakes yesterday.       Current Weight: 245.5 lbs (2/7), 250.2 lbs (2/8), 248.4 lbs (2/14), weight remains elevated from admission, trending down back towards admission  % Weight Change    Pertinent Medications: MEDICATIONS  (STANDING):  acetaminophen   Tablet .. 650 milliGRAM(s) Oral every 6 hours  acetaminophen   Tablet .. 650 milliGRAM(s) Oral <User Schedule>  acyclovir   Oral Tab/Cap 400 milliGRAM(s) Oral every 8 hours  aprepitant 80 milliGRAM(s) Oral every 24 hours  benzonatate 100 milliGRAM(s) Oral every 8 hours  Biotene Dry Mouth Oral Rinse 5 milliLiter(s) Swish and Spit five times a day  cefepime   IVPB      cefepime   IVPB 2000 milliGRAM(s) IV Intermittent every 8 hours  clotrimazole Lozenge 1 Lozenge Oral five times a day  diphenhydrAMINE   Injectable 25 milliGRAM(s) IV Push every 3 weeks  fluconAZOLE   Tablet 400 milliGRAM(s) Oral daily  folic acid 1 milliGRAM(s) Oral daily  heparin  Infusion 464 Unit(s)/Hr (4.64 mL/Hr) IV Continuous <Continuous>  immune   globulin 10% (GAMMAGARD) IVPB 20 Gram(s) IV Intermittent <User Schedule>  levETIRAcetam 500 milliGRAM(s) Oral two times a day  losartan 100 milliGRAM(s) Oral daily  metoclopramide Injectable 10 milliGRAM(s) IV Push every 6 hours  multivitamin 1 Tablet(s) Oral daily  ondansetron Injectable 8 milliGRAM(s) IV Push every 8 hours  palifermin Injectable  (eMAR) 6660 MICROGram(s) IV Push every 24 hours  pantoprazole    Tablet 40 milliGRAM(s) Oral before breakfast  polyethylene glycol 3350 17 Gram(s) Oral daily  sodium bicarbonate Mouth Rinse 10 milliLiter(s) Swish and Spit five times a day  sodium chloride 0.45% 1000 milliLiter(s) (150 mL/Hr) IV Continuous <Continuous>  sodium chloride 0.45%. 1000 milliLiter(s) (200 mL/Hr) IV Continuous <Continuous>  sodium chloride 0.9%. 1000 milliLiter(s) (20 mL/Hr) IV Continuous <Continuous>  ursodiol Capsule 300 milliGRAM(s) Oral two times a day with meals    MEDICATIONS  (PRN):  acetaminophen   Tablet .. 650 milliGRAM(s) Oral every 6 hours PRN Temp greater or equal to 38C (100.4F), Mild Pain (1 - 3)  diphenhydrAMINE   Injectable 25 milliGRAM(s) IV Push every 4 hours PRN Allergy symptoms  docusate sodium 100 milliGRAM(s) Oral three times a day PRN Constipation  HYDROmorphone  Injectable 0.5 milliGRAM(s) IV Push every 4 hours PRN Severe Pain (7 - 10)  LORazepam   Injectable 1 milliGRAM(s) IV Push every 6 hours PRN Nausea and/or Vomiting  oxyCODONE    IR 5 milliGRAM(s) Oral every 6 hours PRN Moderate Pain (4 - 6)  senna 1 Tablet(s) Oral at bedtime PRN Constipation    Pertinent Labs:  02-14 Na141 mmol/L Glu 87 mg/dL K+ 3.5 mmol/L Cr  0.76 mg/dL BUN 12 mg/dL 02-14 Phos 2.9 mg/dL 02-14 Alb 3.4 g/dL      Skin: No edema, skin intact    Estimated Needs:   [ x] no change since previous assessment  [ ] recalculated:       Previous Nutrition Diagnosis:     Predicted suboptimal energy intake and Obesity class 3         Nutrition Diagnosis is [x ] ongoing  [ ] resolved [ ] not applicable          New Nutrition Diagnosis: [ x] not applicable       Interventions:     Recommend    1. Continue regular diet with glutasolve supplement as ordered  2. Continue to encourage po intake and obtain/honor food preferences as able, reviewed importance of adequate po intake with overall goal for weight maintenance        Monitoring and Evaluation:     [x ] PO intake [ x] Tolerance to diet prescription [ x] weights [ x] follow up per protocol    [ ] other:

## 2019-02-14 NOTE — CHART NOTE - NSCHARTNOTEFT_GEN_A_CORE
MEDICINE PA    Notified by RN patient with temperature 102.9F, PAtient Seen and examined patient at bedside. Patient is alert, NAD. Denies HA, CP, SOB, cough, N/V, or abd pain.    VITAL SIGNS:  T(C): 39.4 (02-14-19 @ 21:40), Max: 39.4 (02-14-19 @ 21:40)  HR: 99 (02-14-19 @ 21:40) (75 - 101)  BP: 138/80 (02-14-19 @ 21:40) (126/80 - 158/82)  RR: 20 (02-14-19 @ 21:40) (18 - 20)  SpO2: 99% (02-14-19 @ 21:40) (96% - 100%)  Wt(kg): --      LABORATORY:                          11.4   1.4   )-----------( 52       ( 14 Feb 2019 06:54 )             34.3       02-14    141  |  102  |  12  ----------------------------<  87  3.5   |  27  |  0.76    Ca    9.4      14 Feb 2019 06:54  Phos  2.9     02-14  Mg     1.8     02-14    TPro  6.0  /  Alb  3.4  /  TBili  0.2  /  DBili  x   /  AST  45<H>  /  ALT  51<H>  /  AlkPhos  213<H>  02-14        PHYSICAL EXAM:    Constitutional:  NAD.    Respiratory: clear lungs bilaterally. No wheezing, rhonchi, or crackles.    Cardiovascular: S1 S2. No murmurs.    Gastrointestinal: BS X4 active. soft. nontender.        ASSESSMENT/PLAN:   1) Neutropenic Fever  -tylenol and cooling measures prn for pyrexia, given IV tylenol x 1  -BC x2, UA/UC ordered 2/14   -CXR done 2/14, clear lungs   -c/w current abx regimen   -F/U primary team in AM    Gary Stuart PA-C   Department of Medicine MEDICINE PA    Notified by RN patient with temperature 102.9F, PAtient Seen and examined patient at bedside. Patient is alert, NAD. Denies HA, CP, SOB, cough, N/V, or abd pain.    VITAL SIGNS:  T(C): 39.4 (02-14-19 @ 21:40), Max: 39.4 (02-14-19 @ 21:40)  HR: 99 (02-14-19 @ 21:40) (75 - 101)  BP: 138/80 (02-14-19 @ 21:40) (126/80 - 158/82)  RR: 20 (02-14-19 @ 21:40) (18 - 20)  SpO2: 99% (02-14-19 @ 21:40) (96% - 100%)  Wt(kg): --      LABORATORY:                          11.4   1.4   )-----------( 52       ( 14 Feb 2019 06:54 )             34.3       02-14    141  |  102  |  12  ----------------------------<  87  3.5   |  27  |  0.76    Ca    9.4      14 Feb 2019 06:54  Phos  2.9     02-14  Mg     1.8     02-14    TPro  6.0  /  Alb  3.4  /  TBili  0.2  /  DBili  x   /  AST  45<H>  /  ALT  51<H>  /  AlkPhos  213<H>  02-14        PHYSICAL EXAM:    Constitutional:  NAD.    Respiratory: clear lungs bilaterally. No wheezing, rhonchi, or crackles.    Cardiovascular: S1 S2. No murmurs.    Gastrointestinal: BS X4 active. soft. nontender.        ASSESSMENT/PLAN:   1) Neutropenic Fever  -tylenol and cooling measures prn for pyrexia, given IV tylenol x 1,   -BC x2, UA/UC ordered 2/14   -CXR done 2/14, clear lungs   -c/w current abx regimen   -F/U primary team in AM    addendum: f/u temp > 102F, cooling blanket ordered, RN aware of plan.     ANJU GomesC   Department of Medicine

## 2019-02-14 NOTE — PROGRESS NOTE ADULT - ATTENDING COMMENTS
58 year old female with PMH [place] chromosome positive ALL diagnosed in 2016 s/p R HyperCVAD X 4 cycles, IT MTX X 2, s/p autologous stem cell transplantation (12/16/16),  who presented in October 2018 with subdural hemorrhage and relapsed disease confirmed by peripheral blood flow cytometry treated with Inotuzumab X 2 cycles.  Bone marrow biopsy on 1/23/19 showed remission with FISH and PCR for BCR-ABL translocation negative on the BM specimen but peripheral blood testing on 1/31 showed .004% BCR-ABL transcript with negative FISH suggesting MRD positivity.  Patient was on Ponatinib, DC 1/31/19. LFT's improved off drug. CR2    Patient was admitted for haploidentical stem cell transplantation with fludarabine/cytoxan/TBI conditioning. For HPC transplant today, day 0    Patient complaint of flu-like symptoms on admission, CXR was negative for pneumonia. Viral studies negative.     Continue Cipro, Acyclovir, Fluconazole prophylaxis.    History of SDH, continue Keppra. CT scan baseline 2/7/19 negative for SDH.     History of HTN continue Losartan.     VOD prophylaxis as per protocol. 58 year old female with PMH [place] chromosome positive ALL diagnosed in 2016 s/p R HyperCVAD X 4 cycles, IT MTX X 2, s/p autologous stem cell transplantation (12/16/16),  who presented in October 2018 with subdural hemorrhage and relapsed disease confirmed by peripheral blood flow cytometry treated with Inotuzumab X 2 cycles.  Bone marrow biopsy on 1/23/19 showed remission with FISH and PCR for BCR-ABL translocation negative on the BM specimen but peripheral blood testing on 1/31 showed .004% BCR-ABL transcript with negative FISH suggesting MRD positivity.  Patient was on Ponatinib, DC 1/31/19. LFT's improved off drug. CR2    Patient was admitted for haploidentical stem cell transplantation with fludarabine/cytoxan/TBI conditioning. She is s/p HPC transplant, day +1  For high-dose Cytoxan on days +3, +4(GVHD prophylaxis); then begin Tacrolimis, Cellcept on day +5.  Begin Zarxio on day +5    Patient complaint of flu-like symptoms on admission, CXR was negative for pneumonia. Viral studies negative.     Neutropenic fever- started on Cefepime, cultures sent, CXR done; on Acyclovir, Fluconazole prophylaxis.    History of SDH, continue Keppra. CT scan baseline 2/7/19 negative for SDH.     History of HTN continue Losartan.     VOD prophylaxis as per protocol.  Transaminitis- mild, monitor LFT's.

## 2019-02-14 NOTE — CHART NOTE - NSCHARTNOTESELECT_GEN_ALL_CORE
Problem: Patient Care Overview  Goal: Plan of Care Review  Recommendations     Recommendation/Intervention:   1. Advance to regular oral diet as soon as medically able, texture per SLP.  2. No apparent contraindications for enteral nutrition, patient's gut is functioning. If unable to initiate oral diet within 72 hours, please consider PEG or J-tube placement. Initiating EN with Isosource 1.5 @ goal rate of 60 mL/hr. This will provide 2160 kcal, 98 g protein, and 1100 mL free fluid. Flush tube with 175 mL water q 4 hrs to aid in hydration.    3. If parenteral nutrition to be continued, recommend central line placement, if possible, and initiation of Clinimix 5/20 @ goal rate of 75 mL/hr + IV lipids. This will provide 2084 kcal and 90 g protein.   4. RD following           Nutrition Services

## 2019-02-14 NOTE — PROGRESS NOTE ADULT - SUBJECTIVE AND OBJECTIVE BOX
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    S: Patient seen and examined with HPC Transplant Team:   + fatigue   + nasal congestion   + cough   constipation resolved   Denies mouth / tongue / throat pain, dyspnea, nausea, vomiting, diarrhea, abdominal pain     Vital Signs Last 24 Hrs  T(C): 37.6 (14 Feb 2019 05:42), Max: 37.6 (14 Feb 2019 05:42)  T(F): 99.7 (14 Feb 2019 05:42), Max: 99.7 (14 Feb 2019 05:42)  HR: 97 (14 Feb 2019 05:42) (72 - 97)  BP: 158/82 (14 Feb 2019 05:42) (113/56 - 158/82)  BP(mean): --  RR: 20 (14 Feb 2019 05:42) (18 - 20)  SpO2: 98% (14 Feb 2019 05:42) (98% - 100%)    Admit weight: 111.4 kg  Today's weight:    I&O's Summary    13 Feb 2019 07:01  -  14 Feb 2019 07:00  --------------------------------------------------------  IN: 6475.9 mL / OUT: 7050 mL / NET: -574.1 mL    PE:   Oropharynx: no erythema or ulcers  Oral Mucositis: n/a                                                       stGstrstastdstest:st st1st CVS: S1S2, RRR  Lungs: clear bilaterally  Abdomen: soft, NT/ND, normoactive BS x 4Q  Extremities: no edema  Gastric Mucositis: n/a                                                 stGstrstastdstest:st st1st Intestinal Mucositis: n/a                                             stGstrstastdstest:st st1st Skin: no rash  TLC: C/D/I  Neuro: nonfocal  Pain: none    Labs:                         10.6   0.8   )-----------( 42       ( 13 Feb 2019 06:50 )             31.7     02-14    141  |  102  |  12  ----------------------------<  87  3.5   |  27  |  0.76    Ca    9.4      14 Feb 2019 06:54  Phos  2.9     02-14  Mg     1.8     02-14    TPro  6.0  /  Alb  3.4  /  TBili  0.2  /  DBili  x   /  AST  45<H>  /  ALT  51<H>  /  AlkPhos  213<H>  02-14    PT/INR - ( 14 Feb 2019 06:54 )   PT: 9.8 sec;   INR: 0.86 ratio      PTT - ( 14 Feb 2019 06:54 )  PTT:44.1 sec    Cultures:   no cultures - will culture if febrile     Radiology:   EXAM:  CT BRAIN                        PROCEDURE DATE:  02/08/2019    IMPRESSION:  No acute intracranial abnormality is noted. No subdural collections are   visualized. If the patient has new and persistent symptoms, consider   follow-up brain MRI with and without contrast, if there are no MRI or   contrast contraindications.  New moderate mucosal thickening involves the paranasal sinuses. Correlate   for possible sinusitis    EXAM:  XR CHEST PA LAT 2V                        PROCEDURE DATE:  02/07/2019    IMPRESSION:  1.  Clear lungs    MEDICATIONS  (STANDING):  acetaminophen   Tablet .. 650 milliGRAM(s) Oral every 6 hours  acetaminophen   Tablet .. 650 milliGRAM(s) Oral <User Schedule>  acyclovir   Oral Tab/Cap 400 milliGRAM(s) Oral every 8 hours  aprepitant 80 milliGRAM(s) Oral every 24 hours  benzonatate 100 milliGRAM(s) Oral every 8 hours  Biotene Dry Mouth Oral Rinse 5 milliLiter(s) Swish and Spit five times a day  ciprofloxacin     Tablet 500 milliGRAM(s) Oral every 12 hours  clotrimazole Lozenge 1 Lozenge Oral five times a day  diphenhydrAMINE   Injectable 25 milliGRAM(s) IV Push every 3 weeks  fluconAZOLE   Tablet 400 milliGRAM(s) Oral daily  folic acid 1 milliGRAM(s) Oral daily  heparin  Infusion 464 Unit(s)/Hr (4.64 mL/Hr) IV Continuous <Continuous>  immune   globulin 10% (GAMMAGARD) IVPB 20 Gram(s) IV Intermittent <User Schedule>  levETIRAcetam 500 milliGRAM(s) Oral two times a day  losartan 100 milliGRAM(s) Oral daily  metoclopramide Injectable 10 milliGRAM(s) IV Push every 6 hours  multivitamin 1 Tablet(s) Oral daily  ondansetron Injectable 8 milliGRAM(s) IV Push every 8 hours  palifermin Injectable  (eMAR) 6660 MICROGram(s) IV Push every 24 hours  pantoprazole    Tablet 40 milliGRAM(s) Oral before breakfast  polyethylene glycol 3350 17 Gram(s) Oral daily  sodium bicarbonate Mouth Rinse 10 milliLiter(s) Swish and Spit five times a day  sodium chloride 0.45% 1000 milliLiter(s) (150 mL/Hr) IV Continuous <Continuous>  sodium chloride 0.45%. 1000 milliLiter(s) (200 mL/Hr) IV Continuous <Continuous>  sodium chloride 0.9%. 1000 milliLiter(s) (20 mL/Hr) IV Continuous <Continuous>  ursodiol Capsule 300 milliGRAM(s) Oral two times a day with meals    MEDICATIONS  (PRN):  acetaminophen   Tablet .. 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  Pre Allogeneic PBSCT day +1  HPC transplant today - continue transplant hydration for 24 hours post infusion of cells   H/o subdural hematomas on Keppra;   Baseline CT Head non-con, no acute intracranial abnormality, possible sinusitis   Cough x 3 weeks, CXR clear, start Tessalon 100 mg PRN, RVP (-)  + UTI on admission - started on cipro   Chronic back pain- Dilaudid 0.5 mg IVP q 4 hours PRN    1. Infectious Disease:   acyclovir   Oral Tab/Cap 400 milliGRAM(s) Oral every 8 hours  ciprofloxacin     Tablet 500 milliGRAM(s) Oral every 12 hours  clotrimazole Lozenge 1 Lozenge Oral five times a day  fluconAZOLE   Tablet 400 milliGRAM(s) Oral daily    2. VOD Prophylaxis: Actigall, Glutamine, Heparin (dosed at 100 units / kg / day)     3. GI Prophylaxis:    pantoprazole    Tablet 40 milliGRAM(s) Oral before breakfast    4. Mouthcare - NS / NaHCO3 rinses, Mycelex, Caphosol; Skin care     5. GVHD prophylaxis   CTX days + 3, + 4   , MMF to start on day + 5     6. Transfuse & replete electrolytes prn     7. IV hydration, daily weights, strict I&O, prn diuresis   Continue transplant hydration    8. PO intake as tolerated, nutrition follow up as needed, MVI, folic acid     9. Antiemetics, anti-diarrhea medications:   ondansetron Injectable 8 milliGRAM(s) IV Push every 8 hours  LORazepam   Injectable 1 milliGRAM(s) IV Push every 6 hours PRN  metoclopramide Injectable 10 milliGRAM(s) IV Push every 6 hours  aprepitant 80 milliGRAM(s) Oral every 24 hours    10. OOB as tolerated, physical therapy consult if needed     11. Monitor coags / fibrinogen 2x week, vitamin K as needed     12. Monitor closely for clinical changes, monitor for fevers     13. Emotional support provided, plan of care discussed and questions addressed     14. Patient education done regarding plan of care, restrictions and discharge planning     15. Continue regular social work input     I have written the above note for Dr. Logan who performed service with me in the room.   Antonia Camarillo, ANP-BC ([phone])    I have seen and examined patient with NP, I agree with above note as scribed. 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    S: Patient seen and examined with HPC Transplant Team:   + fatigue   + nasal congestion   + occasional cough   + chronic LB/sciatic pain    Denies mouth / tongue / throat pain, dyspnea, nausea, vomiting, diarrhea, abdominal pain     Vital Signs Last 24 Hrs  T(C): 37.6 (14 Feb 2019 05:42), Max: 37.6 (14 Feb 2019 05:42)  T(F): 99.7 (14 Feb 2019 05:42), Max: 99.7 (14 Feb 2019 05:42)  HR: 97 (14 Feb 2019 05:42) (72 - 97)  BP: 158/82 (14 Feb 2019 05:42) (113/56 - 158/82)  BP(mean): --  RR: 20 (14 Feb 2019 05:42) (18 - 20)  SpO2: 98% (14 Feb 2019 05:42) (98% - 100%)    Admit weight: 111.4 kg  Today's weight: 112.2 kg    I&O's Summary    13 Feb 2019 07:01  -  14 Feb 2019 07:00  --------------------------------------------------------  IN: 6475.9 mL / OUT: 7050 mL / NET: -574.1 mL    PE:   Oropharynx: no erythema or ulcers  Oral Mucositis: n/a                                                       stGstrstastdstest:st st1st CVS: S1S2, RRR  Lungs: clear bilaterally  Abdomen: soft, NT/ND, normoactive BS x 4Q  Extremities: no edema  Gastric Mucositis: n/a                                                 stGstrstastdstest:st st1st Intestinal Mucositis: n/a                                             stGstrstastdstest:st st1st Skin: no rash  TLC: C/D/I  Neuro: nonfocal  Pain: none    Labs:                         10.6   0.8   )-----------( 42       ( 13 Feb 2019 06:50 )             31.7     02-14    141  |  102  |  12  ----------------------------<  87  3.5   |  27  |  0.76    Ca    9.4      14 Feb 2019 06:54  Phos  2.9     02-14  Mg     1.8     02-14    TPro  6.0  /  Alb  3.4  /  TBili  0.2  /  DBili  x   /  AST  45<H>  /  ALT  51<H>  /  AlkPhos  213<H>  02-14    PT/INR - ( 14 Feb 2019 06:54 )   PT: 9.8 sec;   INR: 0.86 ratio      PTT - ( 14 Feb 2019 06:54 )  PTT:44.1 sec    Cultures:   no cultures - will culture if febrile     Radiology:   EXAM:  CT BRAIN                        PROCEDURE DATE:  02/08/2019    IMPRESSION:  No acute intracranial abnormality is noted. No subdural collections are   visualized. If the patient has new and persistent symptoms, consider   follow-up brain MRI with and without contrast, if there are no MRI or   contrast contraindications.  New moderate mucosal thickening involves the paranasal sinuses. Correlate   for possible sinusitis    EXAM:  XR CHEST PA LAT 2V                        PROCEDURE DATE:  02/07/2019    IMPRESSION:  1.  Clear lungs    MEDICATIONS  (STANDING):  acetaminophen   Tablet .. 650 milliGRAM(s) Oral every 6 hours  acetaminophen   Tablet .. 650 milliGRAM(s) Oral <User Schedule>  acyclovir   Oral Tab/Cap 400 milliGRAM(s) Oral every 8 hours  aprepitant 80 milliGRAM(s) Oral every 24 hours  benzonatate 100 milliGRAM(s) Oral every 8 hours  Biotene Dry Mouth Oral Rinse 5 milliLiter(s) Swish and Spit five times a day  ciprofloxacin     Tablet 500 milliGRAM(s) Oral every 12 hours  clotrimazole Lozenge 1 Lozenge Oral five times a day  diphenhydrAMINE   Injectable 25 milliGRAM(s) IV Push every 3 weeks  fluconAZOLE   Tablet 400 milliGRAM(s) Oral daily  folic acid 1 milliGRAM(s) Oral daily  heparin  Infusion 464 Unit(s)/Hr (4.64 mL/Hr) IV Continuous <Continuous>  immune   globulin 10% (GAMMAGARD) IVPB 20 Gram(s) IV Intermittent <User Schedule>  levETIRAcetam 500 milliGRAM(s) Oral two times a day  losartan 100 milliGRAM(s) Oral daily  metoclopramide Injectable 10 milliGRAM(s) IV Push every 6 hours  multivitamin 1 Tablet(s) Oral daily  ondansetron Injectable 8 milliGRAM(s) IV Push every 8 hours  palifermin Injectable  (eMAR) 6660 MICROGram(s) IV Push every 24 hours  pantoprazole    Tablet 40 milliGRAM(s) Oral before breakfast  polyethylene glycol 3350 17 Gram(s) Oral daily  sodium bicarbonate Mouth Rinse 10 milliLiter(s) Swish and Spit five times a day  sodium chloride 0.45% 1000 milliLiter(s) (150 mL/Hr) IV Continuous <Continuous>  sodium chloride 0.45%. 1000 milliLiter(s) (200 mL/Hr) IV Continuous <Continuous>  sodium chloride 0.9%. 1000 milliLiter(s) (20 mL/Hr) IV Continuous <Continuous>  ursodiol Capsule 300 milliGRAM(s) Oral two times a day with meals    MEDICATIONS  (PRN):  acetaminophen   Tablet .. 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  Pre Allogeneic PBSCT day +1  HPC transplant today - continue transplant hydration for 24 hours post infusion of cells   H/o subdural hematomas on Keppra;   Baseline CT Head non-con, no acute intracranial abnormality, possible sinusitis   Cough x 3 weeks, CXR clear, start Tessalon 100 mg PRN, RVP (-)  + UTI on admission - started on cipro, febrile this AM, pancx, CXR pending and Cipro switched to Cefepime  Chronic back pain- Dilaudid 0.5 mg IVP q 4 hours PRN    1. Infectious Disease:   acyclovir   Oral Tab/Cap 400 milliGRAM(s) Oral every 8 hours  ciprofloxacin     Tablet 500 milliGRAM(s) Oral every 12 hours  clotrimazole Lozenge 1 Lozenge Oral five times a day  fluconAZOLE   Tablet 400 milliGRAM(s) Oral daily    2. VOD Prophylaxis: Actigall, Glutamine, Heparin (dosed at 100 units / kg / day)     3. GI Prophylaxis:    pantoprazole    Tablet 40 milliGRAM(s) Oral before breakfast    4. Mouthcare - NS / NaHCO3 rinses, Mycelex, Caphosol; Skin care     5. GVHD prophylaxis   CTX days + 3, + 4   , MMF to start on day + 5     6. Transfuse & replete electrolytes prn     7. IV hydration, daily weights, strict I&O, prn diuresis   Continue transplant hydration  Lasix 49 mg IVP once in AM, reassess need in PM    8. PO intake as tolerated, nutrition follow up as needed, MVI, folic acid     9. Antiemetics, anti-diarrhea medications:   ondansetron Injectable 8 milliGRAM(s) IV Push every 8 hours  LORazepam   Injectable 1 milliGRAM(s) IV Push every 6 hours PRN  metoclopramide Injectable 10 milliGRAM(s) IV Push every 6 hours  aprepitant 80 milliGRAM(s) Oral every 24 hours    10. OOB as tolerated, physical therapy consult if needed     11. Monitor coags / fibrinogen 2x week, vitamin K as needed     12. Monitor closely for clinical changes, monitor for fevers     13. Emotional support provided, plan of care discussed and questions addressed     14. Patient education done regarding plan of care, restrictions and discharge planning     15. Continue regular social work input     I have written the above note for Dr. Logan who performed service with me in the room.   Antonia Camarillo, ANP-BC ([phone])    I have seen and examined patient with NP, I agree with above note as scribed. 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    S: Patient seen and examined with HPC Transplant Team:   + fatigue   + nasal congestion   + occasional cough   + chronic LB/sciatic pain    Denies mouth / tongue / throat pain, dyspnea, nausea, vomiting, diarrhea, abdominal pain     Vital Signs Last 24 Hrs  T(C): 37.6 (14 Feb 2019 05:42), Max: 37.6 (14 Feb 2019 05:42)  T(F): 99.7 (14 Feb 2019 05:42), Max: 99.7 (14 Feb 2019 05:42)  HR: 97 (14 Feb 2019 05:42) (72 - 97)  BP: 158/82 (14 Feb 2019 05:42) (113/56 - 158/82)  BP(mean): --  RR: 20 (14 Feb 2019 05:42) (18 - 20)  SpO2: 98% (14 Feb 2019 05:42) (98% - 100%)    Admit weight: 111.4 kg  Today's weight: 112.2 kg    I&O's Summary    13 Feb 2019 07:01  -  14 Feb 2019 07:00  --------------------------------------------------------  IN: 6475.9 mL / OUT: 7050 mL / NET: -574.1 mL    PE:   Oropharynx: no erythema or ulcers  Oral Mucositis: n/a                                                       stGstrstastdstest:st st1st CVS: S1S2, RRR  Lungs: clear bilaterally  Abdomen: soft, NT/ND, normoactive BS x 4Q  Extremities: no edema  Gastric Mucositis: n/a                                                 stGstrstastdstest:st st1st Intestinal Mucositis: n/a                                             stGstrstastdstest:st st1st Skin: no rash  TLC: C/D/I  Neuro: nonfocal  Pain: none    Labs:                         10.6   0.8   )-----------( 42       ( 13 Feb 2019 06:50 )             31.7     02-14    141  |  102  |  12  ----------------------------<  87  3.5   |  27  |  0.76    Ca    9.4      14 Feb 2019 06:54  Phos  2.9     02-14  Mg     1.8     02-14    TPro  6.0  /  Alb  3.4  /  TBili  0.2  /  DBili  x   /  AST  45<H>  /  ALT  51<H>  /  AlkPhos  213<H>  02-14    PT/INR - ( 14 Feb 2019 06:54 )   PT: 9.8 sec;   INR: 0.86 ratio      PTT - ( 14 Feb 2019 06:54 )  PTT:44.1 sec    Cultures:   no cultures - will culture if febrile     Radiology:   EXAM:  CT BRAIN                        PROCEDURE DATE:  02/08/2019    IMPRESSION:  No acute intracranial abnormality is noted. No subdural collections are   visualized. If the patient has new and persistent symptoms, consider   follow-up brain MRI with and without contrast, if there are no MRI or   contrast contraindications.  New moderate mucosal thickening involves the paranasal sinuses. Correlate   for possible sinusitis    EXAM:  XR CHEST PA LAT 2V                        PROCEDURE DATE:  02/07/2019    IMPRESSION:  1.  Clear lungs    MEDICATIONS  (STANDING):  acetaminophen   Tablet .. 650 milliGRAM(s) Oral every 6 hours  acetaminophen   Tablet .. 650 milliGRAM(s) Oral <User Schedule>  acyclovir   Oral Tab/Cap 400 milliGRAM(s) Oral every 8 hours  aprepitant 80 milliGRAM(s) Oral every 24 hours  benzonatate 100 milliGRAM(s) Oral every 8 hours  Biotene Dry Mouth Oral Rinse 5 milliLiter(s) Swish and Spit five times a day  ciprofloxacin     Tablet 500 milliGRAM(s) Oral every 12 hours  clotrimazole Lozenge 1 Lozenge Oral five times a day  diphenhydrAMINE   Injectable 25 milliGRAM(s) IV Push every 3 weeks  fluconAZOLE   Tablet 400 milliGRAM(s) Oral daily  folic acid 1 milliGRAM(s) Oral daily  heparin  Infusion 464 Unit(s)/Hr (4.64 mL/Hr) IV Continuous <Continuous>  immune   globulin 10% (GAMMAGARD) IVPB 20 Gram(s) IV Intermittent <User Schedule>  levETIRAcetam 500 milliGRAM(s) Oral two times a day  losartan 100 milliGRAM(s) Oral daily  metoclopramide Injectable 10 milliGRAM(s) IV Push every 6 hours  multivitamin 1 Tablet(s) Oral daily  ondansetron Injectable 8 milliGRAM(s) IV Push every 8 hours  palifermin Injectable  (eMAR) 6660 MICROGram(s) IV Push every 24 hours  pantoprazole    Tablet 40 milliGRAM(s) Oral before breakfast  polyethylene glycol 3350 17 Gram(s) Oral daily  sodium bicarbonate Mouth Rinse 10 milliLiter(s) Swish and Spit five times a day  sodium chloride 0.45% 1000 milliLiter(s) (150 mL/Hr) IV Continuous <Continuous>  sodium chloride 0.45%. 1000 milliLiter(s) (200 mL/Hr) IV Continuous <Continuous>  sodium chloride 0.9%. 1000 milliLiter(s) (20 mL/Hr) IV Continuous <Continuous>  ursodiol Capsule 300 milliGRAM(s) Oral two times a day with meals    MEDICATIONS  (PRN):  acetaminophen   Tablet .. 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  Pre Allogeneic PBSCT day +1  HPC transplant today - continue transplant hydration for 24 hours post infusion of cells   H/o subdural hematomas on Keppra;   Baseline CT Head non-con, no acute intracranial abnormality, possible sinusitis   Cough x 3 weeks, CXR clear, start Tessalon 100 mg PRN, RVP (-)  + UTI on admission - started on cipro, febrile this AM, pancx, CXR pending and Cipro switched to Cefepime  Chronic back pain- Dilaudid 0.5 mg IVP q 4 hours PRN  Mild transaminitis, monitor    1. Infectious Disease:   acyclovir   Oral Tab/Cap 400 milliGRAM(s) Oral every 8 hours  ciprofloxacin     Tablet 500 milliGRAM(s) Oral every 12 hours  clotrimazole Lozenge 1 Lozenge Oral five times a day  fluconAZOLE   Tablet 400 milliGRAM(s) Oral daily    2. VOD Prophylaxis: Actigall, Glutamine, Heparin (dosed at 100 units / kg / day)     3. GI Prophylaxis:    pantoprazole    Tablet 40 milliGRAM(s) Oral before breakfast    4. Mouthcare - NS / NaHCO3 rinses, Mycelex, Caphosol; Skin care     5. GVHD prophylaxis   CTX days + 3, + 4   , MMF to start on day + 5     6. Transfuse & replete electrolytes prn     7. IV hydration, daily weights, strict I&O, prn diuresis   Continue transplant hydration  Lasix 49 mg IVP once in AM, reassess need in PM    8. PO intake as tolerated, nutrition follow up as needed, MVI, folic acid     9. Antiemetics, anti-diarrhea medications:   ondansetron Injectable 8 milliGRAM(s) IV Push every 8 hours  LORazepam   Injectable 1 milliGRAM(s) IV Push every 6 hours PRN  metoclopramide Injectable 10 milliGRAM(s) IV Push every 6 hours  aprepitant 80 milliGRAM(s) Oral every 24 hours    10. OOB as tolerated, physical therapy consult if needed     11. Monitor coags / fibrinogen 2x week, vitamin K as needed     12. Monitor closely for clinical changes, monitor for fevers     13. Emotional support provided, plan of care discussed and questions addressed     14. Patient education done regarding plan of care, restrictions and discharge planning     15. Continue regular social work input     I have written the above note for Dr. Logan who performed service with me in the room.   Antonia Camarillo, ANP-BC ([phone])    I have seen and examined patient with NP, I agree with above note as scribed. 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    S: Patient seen and examined with HPC Transplant Team:   + fatigue   + nasal congestion   + occasional cough   + chronic LB/sciatic pain    Denies mouth / tongue / throat pain, dyspnea, nausea, vomiting, diarrhea, abdominal pain     Vital Signs Last 24 Hrs  T(C): 37.6 (14 Feb 2019 05:42), Max: 37.6 (14 Feb 2019 05:42)  T(F): 99.7 (14 Feb 2019 05:42), Max: 99.7 (14 Feb 2019 05:42)  HR: 97 (14 Feb 2019 05:42) (72 - 97)  BP: 158/82 (14 Feb 2019 05:42) (113/56 - 158/82)  BP(mean): --  RR: 20 (14 Feb 2019 05:42) (18 - 20)  SpO2: 98% (14 Feb 2019 05:42) (98% - 100%)    Admit weight: 111.4 kg  Today's weight: 112.2 kg    I&O's Summary    13 Feb 2019 07:01  -  14 Feb 2019 07:00  --------------------------------------------------------  IN: 6475.9 mL / OUT: 7050 mL / NET: -574.1 mL    PE:   Oropharynx: no erythema or ulcers  Oral Mucositis: n/a                                                       stGstrstastdstest:st st1st CVS: S1S2, RRR  Lungs: clear bilaterally  Abdomen: soft, NT/ND, normoactive BS x 4Q  Extremities: no edema  Gastric Mucositis: n/a                                                 stGstrstastdstest:st st1st Intestinal Mucositis: n/a                                             stGstrstastdstest:st st1st Skin: no rash  TLC: C/D/I  Neuro: nonfocal  Pain: none    Labs:                         10.6   0.8   )-----------( 42       ( 13 Feb 2019 06:50 )             31.7     02-14    141  |  102  |  12  ----------------------------<  87  3.5   |  27  |  0.76    Ca    9.4      14 Feb 2019 06:54  Phos  2.9     02-14  Mg     1.8     02-14    TPro  6.0  /  Alb  3.4  /  TBili  0.2  /  DBili  x   /  AST  45<H>  /  ALT  51<H>  /  AlkPhos  213<H>  02-14    PT/INR - ( 14 Feb 2019 06:54 )   PT: 9.8 sec;   INR: 0.86 ratio      PTT - ( 14 Feb 2019 06:54 )  PTT:44.1 sec    Cultures:   no cultures - will culture if febrile     Radiology:   EXAM:  CT BRAIN                        PROCEDURE DATE:  02/08/2019    IMPRESSION:  No acute intracranial abnormality is noted. No subdural collections are   visualized. If the patient has new and persistent symptoms, consider   follow-up brain MRI with and without contrast, if there are no MRI or   contrast contraindications.  New moderate mucosal thickening involves the paranasal sinuses. Correlate   for possible sinusitis    EXAM:  XR CHEST PA LAT 2V                        PROCEDURE DATE:  02/07/2019    IMPRESSION:  1.  Clear lungs    MEDICATIONS  (STANDING):  acetaminophen   Tablet .. 650 milliGRAM(s) Oral every 6 hours  acetaminophen   Tablet .. 650 milliGRAM(s) Oral <User Schedule>  acyclovir   Oral Tab/Cap 400 milliGRAM(s) Oral every 8 hours  aprepitant 80 milliGRAM(s) Oral every 24 hours  benzonatate 100 milliGRAM(s) Oral every 8 hours  Biotene Dry Mouth Oral Rinse 5 milliLiter(s) Swish and Spit five times a day  ciprofloxacin     Tablet 500 milliGRAM(s) Oral every 12 hours  clotrimazole Lozenge 1 Lozenge Oral five times a day  diphenhydrAMINE   Injectable 25 milliGRAM(s) IV Push every 3 weeks  fluconAZOLE   Tablet 400 milliGRAM(s) Oral daily  folic acid 1 milliGRAM(s) Oral daily  heparin  Infusion 464 Unit(s)/Hr (4.64 mL/Hr) IV Continuous <Continuous>  immune   globulin 10% (GAMMAGARD) IVPB 20 Gram(s) IV Intermittent <User Schedule>  levETIRAcetam 500 milliGRAM(s) Oral two times a day  losartan 100 milliGRAM(s) Oral daily  metoclopramide Injectable 10 milliGRAM(s) IV Push every 6 hours  multivitamin 1 Tablet(s) Oral daily  ondansetron Injectable 8 milliGRAM(s) IV Push every 8 hours  palifermin Injectable  (eMAR) 6660 MICROGram(s) IV Push every 24 hours  pantoprazole    Tablet 40 milliGRAM(s) Oral before breakfast  polyethylene glycol 3350 17 Gram(s) Oral daily  sodium bicarbonate Mouth Rinse 10 milliLiter(s) Swish and Spit five times a day  sodium chloride 0.45% 1000 milliLiter(s) (150 mL/Hr) IV Continuous <Continuous>  sodium chloride 0.45%. 1000 milliLiter(s) (200 mL/Hr) IV Continuous <Continuous>  sodium chloride 0.9%. 1000 milliLiter(s) (20 mL/Hr) IV Continuous <Continuous>  ursodiol Capsule 300 milliGRAM(s) Oral two times a day with meals    MEDICATIONS  (PRN):  acetaminophen   Tablet .. 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  Pre Allogeneic PBSCT day +1  HPC transplant today - continue transplant hydration for 24 hours post infusion of cells   H/o subdural hematomas on Keppra;   Baseline CT Head non-con, no acute intracranial abnormality, possible sinusitis   Cough x 3 weeks, CXR clear, start Tessalon 100 mg PRN, RVP (-)  + UTI on admission - started on cipro, febrile this AM, pancx, CXR pending and Cipro switched to Cefepime  Chronic back pain- Dilaudid 0.5 mg IVP q 4 hours PRN  Mild transaminitis, monitor    1. Infectious Disease:   acyclovir   Oral Tab/Cap 400 milliGRAM(s) Oral every 8 hours  ciprofloxacin     Tablet 500 milliGRAM(s) Oral every 12 hours  clotrimazole Lozenge 1 Lozenge Oral five times a day  fluconAZOLE   Tablet 400 milliGRAM(s) Oral daily    2. VOD Prophylaxis: Actigall, Glutamine, Heparin (dosed at 100 units / kg / day)     3. GI Prophylaxis:    pantoprazole    Tablet 40 milliGRAM(s) Oral before breakfast    4. Mouthcare - NS / NaHCO3 rinses, Mycelex, Caphosol; Skin care     5. GVHD prophylaxis   CTX days + 3, + 4   , MMF to start on day + 5     6. Transfuse & replete electrolytes prn     7. IV hydration, daily weights, strict I&O, prn diuresis   Continue transplant hydration  Lasix 40 mg IVP once in AM, reassess need in PM  KCl 20 meq x 2 doses    8. PO intake as tolerated, nutrition follow up as needed, MVI, folic acid     9. Antiemetics, anti-diarrhea medications:   ondansetron Injectable 8 milliGRAM(s) IV Push every 8 hours  LORazepam   Injectable 1 milliGRAM(s) IV Push every 6 hours PRN  metoclopramide Injectable 10 milliGRAM(s) IV Push every 6 hours  aprepitant 80 milliGRAM(s) Oral every 24 hours    10. OOB as tolerated, physical therapy consult if needed     11. Monitor coags / fibrinogen 2x week, vitamin K as needed     12. Monitor closely for clinical changes, monitor for fevers     13. Emotional support provided, plan of care discussed and questions addressed     14. Patient education done regarding plan of care, restrictions and discharge planning     15. Continue regular social work input     I have written the above note for Dr. Logan who performed service with me in the room.   Antonia Camarillo, ANP-BC ([phone])    I have seen and examined patient with NP, I agree with above note as scribed.

## 2019-02-15 LAB
ALBUMIN SERPL ELPH-MCNC: 3.2 G/DL — LOW (ref 3.3–5)
ALP SERPL-CCNC: 181 U/L — HIGH (ref 40–120)
ALT FLD-CCNC: 45 U/L — SIGNIFICANT CHANGE UP (ref 10–45)
ANION GAP SERPL CALC-SCNC: 11 MMOL/L — SIGNIFICANT CHANGE UP (ref 5–17)
AST SERPL-CCNC: 32 U/L — SIGNIFICANT CHANGE UP (ref 10–40)
BILIRUB SERPL-MCNC: 0.2 MG/DL — SIGNIFICANT CHANGE UP (ref 0.2–1.2)
BLD GP AB SCN SERPL QL: NEGATIVE — SIGNIFICANT CHANGE UP
BUN SERPL-MCNC: 9 MG/DL — SIGNIFICANT CHANGE UP (ref 7–23)
CALCIUM SERPL-MCNC: 8.7 MG/DL — SIGNIFICANT CHANGE UP (ref 8.4–10.5)
CHLORIDE SERPL-SCNC: 101 MMOL/L — SIGNIFICANT CHANGE UP (ref 96–108)
CO2 SERPL-SCNC: 27 MMOL/L — SIGNIFICANT CHANGE UP (ref 22–31)
CREAT SERPL-MCNC: 0.75 MG/DL — SIGNIFICANT CHANGE UP (ref 0.5–1.3)
CULTURE RESULTS: SIGNIFICANT CHANGE UP
GLUCOSE SERPL-MCNC: 101 MG/DL — HIGH (ref 70–99)
HCT VFR BLD CALC: 31.8 % — LOW (ref 34.5–45)
HGB BLD-MCNC: 10.8 G/DL — LOW (ref 11.5–15.5)
LDH SERPL L TO P-CCNC: 153 U/L — SIGNIFICANT CHANGE UP (ref 50–242)
MAGNESIUM SERPL-MCNC: 1.7 MG/DL — SIGNIFICANT CHANGE UP (ref 1.6–2.6)
MCHC RBC-ENTMCNC: 34.1 GM/DL — SIGNIFICANT CHANGE UP (ref 32–36)
MCHC RBC-ENTMCNC: 35.1 PG — HIGH (ref 27–34)
MCV RBC AUTO: 103 FL — HIGH (ref 80–100)
PHOSPHATE SERPL-MCNC: 3 MG/DL — SIGNIFICANT CHANGE UP (ref 2.5–4.5)
PLATELET # BLD AUTO: 31 K/UL — LOW (ref 150–400)
POTASSIUM SERPL-MCNC: 3.2 MMOL/L — LOW (ref 3.5–5.3)
POTASSIUM SERPL-SCNC: 3.2 MMOL/L — LOW (ref 3.5–5.3)
PROT SERPL-MCNC: 5.6 G/DL — LOW (ref 6–8.3)
RBC # BLD: 3.08 M/UL — LOW (ref 3.8–5.2)
RBC # FLD: 12.5 % — SIGNIFICANT CHANGE UP (ref 10.3–14.5)
RH IG SCN BLD-IMP: POSITIVE — SIGNIFICANT CHANGE UP
SODIUM SERPL-SCNC: 139 MMOL/L — SIGNIFICANT CHANGE UP (ref 135–145)
SPECIMEN SOURCE: SIGNIFICANT CHANGE UP
WBC # BLD: 0.5 K/UL — CRITICAL LOW (ref 3.8–10.5)
WBC # FLD AUTO: 0.5 K/UL — CRITICAL LOW (ref 3.8–10.5)

## 2019-02-15 PROCEDURE — 99291 CRITICAL CARE FIRST HOUR: CPT

## 2019-02-15 RX ORDER — POTASSIUM CHLORIDE 20 MEQ
20 PACKET (EA) ORAL
Qty: 0 | Refills: 0 | Status: COMPLETED | OUTPATIENT
Start: 2019-02-15 | End: 2019-02-15

## 2019-02-15 RX ORDER — NYSTATIN CREAM 100000 [USP'U]/G
1 CREAM TOPICAL EVERY 8 HOURS
Qty: 0 | Refills: 0 | Status: DISCONTINUED | OUTPATIENT
Start: 2019-02-15 | End: 2019-03-04

## 2019-02-15 RX ORDER — FUROSEMIDE 40 MG
20 TABLET ORAL ONCE
Qty: 0 | Refills: 0 | Status: COMPLETED | OUTPATIENT
Start: 2019-02-15 | End: 2019-02-15

## 2019-02-15 RX ADMIN — Medication 650 MILLIGRAM(S): at 04:50

## 2019-02-15 RX ADMIN — Medication 10 MILLILITER(S): at 12:47

## 2019-02-15 RX ADMIN — NYSTATIN CREAM 1 APPLICATION(S): 100000 CREAM TOPICAL at 14:36

## 2019-02-15 RX ADMIN — ONDANSETRON 8 MILLIGRAM(S): 8 TABLET, FILM COATED ORAL at 01:11

## 2019-02-15 RX ADMIN — HYDROMORPHONE HYDROCHLORIDE 0.5 MILLIGRAM(S): 2 INJECTION INTRAMUSCULAR; INTRAVENOUS; SUBCUTANEOUS at 06:56

## 2019-02-15 RX ADMIN — Medication 5 MILLILITER(S): at 16:38

## 2019-02-15 RX ADMIN — Medication 100 MILLIGRAM(S): at 21:29

## 2019-02-15 RX ADMIN — Medication 100 MILLIGRAM(S): at 05:27

## 2019-02-15 RX ADMIN — PANTOPRAZOLE SODIUM 40 MILLIGRAM(S): 20 TABLET, DELAYED RELEASE ORAL at 05:28

## 2019-02-15 RX ADMIN — Medication 6660 MICROGRAM(S): at 16:30

## 2019-02-15 RX ADMIN — HYDROMORPHONE HYDROCHLORIDE 0.5 MILLIGRAM(S): 2 INJECTION INTRAMUSCULAR; INTRAVENOUS; SUBCUTANEOUS at 07:25

## 2019-02-15 RX ADMIN — Medication 1 TABLET(S): at 12:57

## 2019-02-15 RX ADMIN — LEVETIRACETAM 500 MILLIGRAM(S): 250 TABLET, FILM COATED ORAL at 18:14

## 2019-02-15 RX ADMIN — LOSARTAN POTASSIUM 100 MILLIGRAM(S): 100 TABLET, FILM COATED ORAL at 05:28

## 2019-02-15 RX ADMIN — FLUCONAZOLE 400 MILLIGRAM(S): 150 TABLET ORAL at 12:57

## 2019-02-15 RX ADMIN — Medication 1 LOZENGE: at 09:03

## 2019-02-15 RX ADMIN — ONDANSETRON 8 MILLIGRAM(S): 8 TABLET, FILM COATED ORAL at 18:15

## 2019-02-15 RX ADMIN — ONDANSETRON 8 MILLIGRAM(S): 8 TABLET, FILM COATED ORAL at 09:04

## 2019-02-15 RX ADMIN — LEVETIRACETAM 500 MILLIGRAM(S): 250 TABLET, FILM COATED ORAL at 05:27

## 2019-02-15 RX ADMIN — Medication 100 MILLIGRAM(S): at 14:29

## 2019-02-15 RX ADMIN — Medication 650 MILLIGRAM(S): at 05:50

## 2019-02-15 RX ADMIN — APREPITANT 80 MILLIGRAM(S): 80 CAPSULE ORAL at 18:13

## 2019-02-15 RX ADMIN — Medication 5 MILLILITER(S): at 12:47

## 2019-02-15 RX ADMIN — Medication 50 MILLIEQUIVALENT(S): at 14:30

## 2019-02-15 RX ADMIN — Medication 10 MILLILITER(S): at 16:38

## 2019-02-15 RX ADMIN — URSODIOL 300 MILLIGRAM(S): 250 TABLET, FILM COATED ORAL at 18:13

## 2019-02-15 RX ADMIN — Medication 400 MILLIGRAM(S): at 14:28

## 2019-02-15 RX ADMIN — HEPARIN SODIUM 4.64 UNIT(S)/HR: 5000 INJECTION INTRAVENOUS; SUBCUTANEOUS at 23:35

## 2019-02-15 RX ADMIN — HYDROMORPHONE HYDROCHLORIDE 0.5 MILLIGRAM(S): 2 INJECTION INTRAMUSCULAR; INTRAVENOUS; SUBCUTANEOUS at 20:00

## 2019-02-15 RX ADMIN — Medication 10 MILLIGRAM(S): at 23:34

## 2019-02-15 RX ADMIN — CEFEPIME 100 MILLIGRAM(S): 1 INJECTION, POWDER, FOR SOLUTION INTRAMUSCULAR; INTRAVENOUS at 05:28

## 2019-02-15 RX ADMIN — Medication 5 MILLILITER(S): at 19:59

## 2019-02-15 RX ADMIN — CEFEPIME 100 MILLIGRAM(S): 1 INJECTION, POWDER, FOR SOLUTION INTRAMUSCULAR; INTRAVENOUS at 14:31

## 2019-02-15 RX ADMIN — Medication 10 MILLIGRAM(S): at 18:14

## 2019-02-15 RX ADMIN — Medication 50 MILLIEQUIVALENT(S): at 12:00

## 2019-02-15 RX ADMIN — Medication 1 LOZENGE: at 23:34

## 2019-02-15 RX ADMIN — HYDROMORPHONE HYDROCHLORIDE 0.5 MILLIGRAM(S): 2 INJECTION INTRAMUSCULAR; INTRAVENOUS; SUBCUTANEOUS at 19:41

## 2019-02-15 RX ADMIN — Medication 5 MILLILITER(S): at 09:03

## 2019-02-15 RX ADMIN — Medication 10 MILLILITER(S): at 19:59

## 2019-02-15 RX ADMIN — Medication 650 MILLIGRAM(S): at 21:29

## 2019-02-15 RX ADMIN — Medication 10 MILLILITER(S): at 23:34

## 2019-02-15 RX ADMIN — Medication 20 MILLIGRAM(S): at 13:45

## 2019-02-15 RX ADMIN — Medication 400 MILLIGRAM(S): at 21:29

## 2019-02-15 RX ADMIN — Medication 650 MILLIGRAM(S): at 22:30

## 2019-02-15 RX ADMIN — Medication 1 LOZENGE: at 16:38

## 2019-02-15 RX ADMIN — HYDROMORPHONE HYDROCHLORIDE 0.5 MILLIGRAM(S): 2 INJECTION INTRAMUSCULAR; INTRAVENOUS; SUBCUTANEOUS at 14:05

## 2019-02-15 RX ADMIN — Medication 10 MILLIGRAM(S): at 04:45

## 2019-02-15 RX ADMIN — Medication 10 MILLIGRAM(S): at 12:47

## 2019-02-15 RX ADMIN — Medication 5 MILLILITER(S): at 23:34

## 2019-02-15 RX ADMIN — Medication 10 MILLILITER(S): at 09:03

## 2019-02-15 RX ADMIN — URSODIOL 300 MILLIGRAM(S): 250 TABLET, FILM COATED ORAL at 09:03

## 2019-02-15 RX ADMIN — Medication 1 LOZENGE: at 19:59

## 2019-02-15 RX ADMIN — NYSTATIN CREAM 1 APPLICATION(S): 100000 CREAM TOPICAL at 21:30

## 2019-02-15 RX ADMIN — Medication 1 MILLIGRAM(S): at 12:48

## 2019-02-15 RX ADMIN — Medication 1 LOZENGE: at 12:47

## 2019-02-15 RX ADMIN — CEFEPIME 100 MILLIGRAM(S): 1 INJECTION, POWDER, FOR SOLUTION INTRAMUSCULAR; INTRAVENOUS at 21:30

## 2019-02-15 RX ADMIN — Medication 50 MILLIEQUIVALENT(S): at 10:00

## 2019-02-15 RX ADMIN — Medication 400 MILLIGRAM(S): at 05:27

## 2019-02-15 RX ADMIN — SODIUM CHLORIDE 20 MILLILITER(S): 9 INJECTION INTRAMUSCULAR; INTRAVENOUS; SUBCUTANEOUS at 23:35

## 2019-02-15 RX ADMIN — HYDROMORPHONE HYDROCHLORIDE 0.5 MILLIGRAM(S): 2 INJECTION INTRAMUSCULAR; INTRAVENOUS; SUBCUTANEOUS at 13:35

## 2019-02-15 RX ADMIN — SODIUM CHLORIDE 255 MILLILITER(S): 9 INJECTION, SOLUTION INTRAVENOUS at 22:05

## 2019-02-15 NOTE — PROGRESS NOTE ADULT - ATTENDING COMMENTS
58 year old female with PMH [place] chromosome positive ALL diagnosed in 2016 s/p R HyperCVAD X 4 cycles, IT MTX X 2, s/p autologous stem cell transplantation (12/16/16),  who presented in October 2018 with subdural hemorrhage and relapsed disease confirmed by peripheral blood flow cytometry treated with Inotuzumab X 2 cycles.  Bone marrow biopsy on 1/23/19 showed remission with FISH and PCR for BCR-ABL translocation negative on the BM specimen but peripheral blood testing on 1/31 showed .004% BCR-ABL transcript with negative FISH suggesting MRD positivity.  Patient was on Ponatinib, DC 1/31/19. LFT's improved off drug. CR2    Patient was admitted for haploidentical stem cell transplantation with fludarabine/cytoxan/TBI conditioning. She is s/p HPC transplant, day +1  For high-dose Cytoxan on days +3, +4(GVHD prophylaxis); then begin Tacrolimis, Cellcept on day +5.  Begin Zarxio on day +5    Patient complaint of flu-like symptoms on admission, CXR was negative for pneumonia. Viral studies negative.     Neutropenic fever- started on Cefepime, cultures sent, CXR done; on Acyclovir, Fluconazole prophylaxis.    History of SDH, continue Keppra. CT scan baseline 2/7/19 negative for SDH.     History of HTN continue Losartan.     VOD prophylaxis as per protocol.  Transaminitis- mild, monitor LFT's. 58 year old female with PMH [place] chromosome positive ALL diagnosed in 2016 s/p R HyperCVAD X 4 cycles, IT MTX X 2, s/p autologous stem cell transplantation (12/16/16),  who presented in October 2018 with subdural hemorrhage and relapsed disease confirmed by peripheral blood flow cytometry treated with Inotuzumab X 2 cycles.  Bone marrow biopsy on 1/23/19 showed remission with FISH and PCR for BCR-ABL translocation negative on the BM specimen but peripheral blood testing on 1/31 showed .004% BCR-ABL transcript with negative FISH suggesting MRD positivity.  Patient was on Ponatinib, DC 1/31/19. LFT's improved off drug. CR2    Patient was admitted for haploidentical stem cell transplantation with fludarabine/cytoxan/TBI conditioning. She is s/p HPC transplant, day +2  For high-dose Cytoxan on days +3, +4(GVHD prophylaxis); then begin Tacrolimis, Cellcept on day +5.  Begin Zarxio on day +5    Patient complaint of flu-like symptoms on admission, CXR was negative for pneumonia. Viral studies negative.     Neutropenic fever- started on Cefepime, cultures sent, CXR done; on Acyclovir, Fluconazole prophylaxis.    History of SDH, continue Keppra. CT scan baseline 2/7/19 negative for SDH.     History of HTN continue Losartan.     VOD prophylaxis as per protocol.  Transaminitis- mild, monitor LFT's.

## 2019-02-15 NOTE — CHART NOTE - NSCHARTNOTEFT_GEN_A_CORE
Called by RN to evaluate Pt. for temp =100.9   (f)     .  Pt seen and evaluated at bedside.  Denies headache, dizziness, visual disturbance, chest pain, cough, SOB, palpitations, abdominal pain, N/V/D , dysuria.   Offers no complaints at present time.      Vital Signs Last 24 Hrs  T(C): 38.3 (15 Feb 2019 21:07), Max: 39.4 (14 Feb 2019 21:40)  T(F): 100.9 (15 Feb 2019 21:07), Max: 102.9 (14 Feb 2019 21:40)  HR: 85 (15 Feb 2019 21:07) (82 - 99)  BP: 132/87 (15 Feb 2019 21:07) (112/64 - 143/85)  BP(mean): --  RR: 19 (15 Feb 2019 21:07) (18 - 21)  SpO2: 100% (15 Feb 2019 21:07) (97% - 100%)    Labs:                        10.8   0.5   )-----------( 31       ( 15 Feb 2019 06:56 )             31.8       02-15    139  |  101  |  9   ----------------------------<  101<H>  3.2<L>   |  27  |  0.75    Ca    8.7      15 Feb 2019 06:55  Phos  3.0     02-15  Mg     1.7     02-15    TPro  5.6<L>  /  Alb  3.2<L>  /  TBili  0.2  /  DBili  x   /  AST  32  /  ALT  45  /  AlkPhos  181<H>  02-15      Antimicrobials:  MEDICATIONS  (STANDING):  acetaminophen   Tablet .. 650 milliGRAM(s) Oral every 6 hours  acetaminophen   Tablet .. 650 milliGRAM(s) Oral <User Schedule>  acyclovir   Oral Tab/Cap 400 milliGRAM(s) Oral every 8 hours  aprepitant 80 milliGRAM(s) Oral every 24 hours  benzonatate 100 milliGRAM(s) Oral every 8 hours  Biotene Dry Mouth Oral Rinse 5 milliLiter(s) Swish and Spit five times a day  cefepime   IVPB      cefepime   IVPB 2000 milliGRAM(s) IV Intermittent every 8 hours  clotrimazole Lozenge 1 Lozenge Oral five times a day  diphenhydrAMINE   Injectable 25 milliGRAM(s) IV Push every 3 weeks  fluconAZOLE   Tablet 400 milliGRAM(s) Oral daily  folic acid 1 milliGRAM(s) Oral daily  heparin  Infusion 464 Unit(s)/Hr (4.64 mL/Hr) IV Continuous <Continuous>  immune   globulin 10% (GAMMAGARD) IVPB 20 Gram(s) IV Intermittent <User Schedule>  levETIRAcetam 500 milliGRAM(s) Oral two times a day  losartan 100 milliGRAM(s) Oral daily  metoclopramide Injectable 10 milliGRAM(s) IV Push every 6 hours  multivitamin 1 Tablet(s) Oral daily  nystatin Powder 1 Application(s) Topical every 8 hours  ondansetron Injectable 8 milliGRAM(s) IV Push every 8 hours  pantoprazole    Tablet 40 milliGRAM(s) Oral before breakfast  polyethylene glycol 3350 17 Gram(s) Oral daily  sodium bicarbonate Mouth Rinse 10 milliLiter(s) Swish and Spit five times a day  sodium chloride 0.45% 1000 milliLiter(s) (255 mL/Hr) IV Continuous <Continuous>  sodium chloride 0.9%. 1000 milliLiter(s) (20 mL/Hr) IV Continuous <Continuous>  ursodiol Capsule 300 milliGRAM(s) Oral two times a day with meals        PE:    General: Alert & Oriented x 3, non toxic, in no acute distress  Neuro: non focal  Cardiac: S1, S2, tachycardic  Pulm: Lungs CTA  GI: Abd soft, NT/ND, + bowel sounds x 4 quadrants  Ext: no edema, + pedal pulses BL  Skin: intact      AP:     1.  Neutropenic fever       - continue with current antimicrobials       - continue antipyretic/cooling measures      - continue IV hydration      - BC x 2      - UA/CS      - f/u panculture results      - cxr      - continue to closely monitor      - f/u with primary team in AM    Balbina Erickson, ANP x Called by RN to evaluate Pt. for temp =100.9 (f) 38.3(c) .  Pt seen and evaluated at bedside.  Denies headache, dizziness, visual disturbance, chest pain, cough, SOB, palpitations, abdominal pain, N/V/D , dysuria.   Offers no complaints at present time.      Vital Signs Last 24 Hrs  T(C): 38.3 (15 Feb 2019 21:07), Max: 39.4 (14 Feb 2019 21:40)  T(F): 100.9 (15 Feb 2019 21:07), Max: 102.9 (14 Feb 2019 21:40)  HR: 85 (15 Feb 2019 21:07) (82 - 99)  BP: 132/87 (15 Feb 2019 21:07) (112/64 - 143/85)  BP(mean): --  RR: 19 (15 Feb 2019 21:07) (18 - 21)  SpO2: 100% (15 Feb 2019 21:07) (97% - 100%)    Labs:                        10.8   0.5   )-----------( 31       ( 15 Feb 2019 06:56 )             31.8       02-15    139  |  101  |  9   ----------------------------<  101<H>  3.2<L>   |  27  |  0.75    Ca    8.7      15 Feb 2019 06:55  Phos  3.0     02-15  Mg     1.7     02-15    TPro  5.6<L>  /  Alb  3.2<L>  /  TBili  0.2  /  DBili  x   /  AST  32  /  ALT  45  /  AlkPhos  181<H>  02-15    Culture - Urine (02.14.19 @ 14:10)    Specimen Source: .Urine Clean Catch (Midstream)    Culture Results:   <10,000 CFU/ml Normal Urogenital everton present    Culture - Blood (02.14.19 @ 12:00)    Specimen Source: .Blood Blood-Catheter    Culture Results:   No growth to date.    Culture - Blood (02.14.19 @ 12:00)    Specimen Source: .Blood Blood-Catheter    Culture Results:   No growth to date.    CXR 2/14/2019 IMPRESSION:    1.  The lungs are clear.      Antimicrobials:    acyclovir   Oral Tab/Cap 400 milliGRAM(s) Oral every 8 hours  cefepime   IVPB 2000 milliGRAM(s) IV Intermittent every 8 hours  clotrimazole Lozenge 1 Lozenge Oral five times a day  fluconAZOLE   Tablet 400 milliGRAM(s) Oral daily      PE:    General: Alert & Oriented x 3, non toxic, in no acute distress  Neuro: non focal  Cardiac: S1, S2, RRR  Pulm: Lungs CTA  GI: Abd soft, NT/ND, + bowel sounds x 4 quadrants  Ext: no edema, + pedal pulses BL  Skin: intact      AP:     1.  Neutropenic fever       - continue with current antimicrobials       - continue antipyretic/cooling measures      - continue IV hydration      - f/u panculture results       - continue to closely monitor      - f/u with primary team in AM    Balbina Erickson, MUKESH x [phone] Called by RN to evaluate Pt. for temp =100.9 (f) 38.3(c) .  Pt seen and evaluated at bedside.  Denies headache, dizziness, visual disturbance, chest pain, cough, SOB, palpitations, abdominal pain, N/V/D , dysuria.   c/o chronic sciatic pain.      Vital Signs Last 24 Hrs  T(C): 38.3 (15 Feb 2019 21:07), Max: 39.4 (14 Feb 2019 21:40)  T(F): 100.9 (15 Feb 2019 21:07), Max: 102.9 (14 Feb 2019 21:40)  HR: 85 (15 Feb 2019 21:07) (82 - 99)  BP: 132/87 (15 Feb 2019 21:07) (112/64 - 143/85)  BP(mean): --  RR: 19 (15 Feb 2019 21:07) (18 - 21)  SpO2: 100% (15 Feb 2019 21:07) (97% - 100%)    Labs:                        10.8   0.5   )-----------( 31       ( 15 Feb 2019 06:56 )             31.8       02-15    139  |  101  |  9   ----------------------------<  101<H>  3.2<L>   |  27  |  0.75    Ca    8.7      15 Feb 2019 06:55  Phos  3.0     02-15  Mg     1.7     02-15    TPro  5.6<L>  /  Alb  3.2<L>  /  TBili  0.2  /  DBili  x   /  AST  32  /  ALT  45  /  AlkPhos  181<H>  02-15    Culture - Urine (02.14.19 @ 14:10)    Specimen Source: .Urine Clean Catch (Midstream)    Culture Results:   <10,000 CFU/ml Normal Urogenital everton present    Culture - Blood (02.14.19 @ 12:00)    Specimen Source: .Blood Blood-Catheter    Culture Results:   No growth to date.    Culture - Blood (02.14.19 @ 12:00)    Specimen Source: .Blood Blood-Catheter    Culture Results:   No growth to date.    CXR 2/14/2019 IMPRESSION:    1.  The lungs are clear.      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 (Day +2).  Now with neutropenic fever.    1.  Neutropenic fever       - continue with current antimicrobials       - continue antipyretic/cooling measures      - continue IV hydration      - f/u panculture results       - continue to closely monitor      - f/u with primary team in AM    Balbina Erickson, ANP x [phone]

## 2019-02-15 NOTE — PROGRESS NOTE ADULT - SUBJECTIVE AND OBJECTIVE BOX
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    S: Patient seen and examined with HPC Transplant Team:   + fatigue   + nasal congestion   + occasional cough   + chronic LB/sciatic pain    Denies mouth / tongue / throat pain, dyspnea, nausea, vomiting, diarrhea, abdominal pain     Vital Signs Last 24 Hrs  T(C): 37.8 (15 Feb 2019 06:15), Max: 39.4 (2019 21:40)  T(F): 100 (15 Feb 2019 06:15), Max: 102.9 (2019 21:40)  HR: 95 (15 Feb 2019 05:00) (88 - 101)  BP: 112/64 (15 Feb 2019 05:00) (112/64 - 138/80)  BP(mean): --  RR: 21 (15 Feb 2019 05:00) (18 - 21)  SpO2: 100% (15 Feb 2019 05:00) (96% - 100%)    Admit weight: 111.4 kg  Today's weight:     I&O's Summary    2019 07:01  -  15 Feb 2019 07:00  --------------------------------------------------------  IN: 4735.9 mL / OUT: 5550 mL / NET: -814.1 mL    PE:   Oropharynx: no erythema or ulcers  Oral Mucositis: n/a                                                       stGstrstastdstest:st st1st CVS: S1S2, RRR  Lungs: clear bilaterally  Abdomen: soft, NT/ND, normoactive BS x 4Q  Extremities: no edema  Gastric Mucositis: n/a                                                 stGstrstastdstest:st st1st Intestinal Mucositis: n/a                                             stGstrstastdstest:st st1st Skin: no rash  TLC: C/D/I  Neuro: nonfocal  Pain: none    Labs:                         11.4   1.4   )-----------( 52       ( 2019 06:54 )             34.3     02-14    141  |  102  |  12  ----------------------------<  87  3.5   |  27  |  0.76    Ca    9.4      2019 06:54  Phos  2.9       Mg     1.8         TPro  6.0  /  Alb  3.4  /  TBili  0.2  /  DBili  x   /  AST  45<H>  /  ALT  51<H>  /  AlkPhos  213<H>      CAPILLARY BLOOD GLUCOSE    PT/INR - ( 2019 06:54 )   PT: 9.8 sec;   INR: 0.86 ratio      PTT - ( 2019 06:54 )  PTT:44.1 sec  Urinalysis Basic - ( 2019 08:56 )    Color: Light Yellow / Appearance: Clear / S.012 / pH: x  Gluc: x / Ketone: Negative  / Bili: Negative / Urobili: Negative   Blood: x / Protein: Negative / Nitrite: Negative   Leuk Esterase: Moderate / RBC: 2 /hpf / WBC 16 /HPF   Sq Epi: x / Non Sq Epi: 1 /hpf / Bacteria: Negative    Cultures:   Blood and urine cultures pending from     Radiology:   from: Xray Chest 1 View- PORTABLE-Urgent (19 @ 09:12) >  IMPRESSION:    1.  The lungs are clear.    EXAM:  CT BRAIN                        PROCEDURE DATE:  2019    IMPRESSION:  No acute intracranial abnormality is noted. No subdural collections are   visualized. If the patient has new and persistent symptoms, consider   follow-up brain MRI with and without contrast, if there are no MRI or   contrast contraindications.  New moderate mucosal thickening involves the paranasal sinuses. Correlate   for possible sinusitis    EXAM:  XR CHEST PA LAT 2V                        PROCEDURE DATE:  2019    IMPRESSION:  1.  Clear lungs    MEDICATIONS  (STANDING):  acetaminophen   Tablet .. 650 milliGRAM(s) Oral every 6 hours  acetaminophen   Tablet .. 650 milliGRAM(s) Oral <User Schedule>  acyclovir   Oral Tab/Cap 400 milliGRAM(s) Oral every 8 hours  aprepitant 80 milliGRAM(s) Oral every 24 hours  benzonatate 100 milliGRAM(s) Oral every 8 hours  Biotene Dry Mouth Oral Rinse 5 milliLiter(s) Swish and Spit five times a day  ciprofloxacin     Tablet 500 milliGRAM(s) Oral every 12 hours  clotrimazole Lozenge 1 Lozenge Oral five times a day  diphenhydrAMINE   Injectable 25 milliGRAM(s) IV Push every 3 weeks  fluconAZOLE   Tablet 400 milliGRAM(s) Oral daily  folic acid 1 milliGRAM(s) Oral daily  heparin  Infusion 464 Unit(s)/Hr (4.64 mL/Hr) IV Continuous <Continuous>  immune   globulin 10% (GAMMAGARD) IVPB 20 Gram(s) IV Intermittent <User Schedule>  levETIRAcetam 500 milliGRAM(s) Oral two times a day  losartan 100 milliGRAM(s) Oral daily  metoclopramide Injectable 10 milliGRAM(s) IV Push every 6 hours  multivitamin 1 Tablet(s) Oral daily  ondansetron Injectable 8 milliGRAM(s) IV Push every 8 hours  palifermin Injectable  (eMAR) 6660 MICROGram(s) IV Push every 24 hours  pantoprazole    Tablet 40 milliGRAM(s) Oral before breakfast  polyethylene glycol 3350 17 Gram(s) Oral daily  sodium bicarbonate Mouth Rinse 10 milliLiter(s) Swish and Spit five times a day  sodium chloride 0.45% 1000 milliLiter(s) (150 mL/Hr) IV Continuous <Continuous>  sodium chloride 0.45%. 1000 milliLiter(s) (200 mL/Hr) IV Continuous <Continuous>  sodium chloride 0.9%. 1000 milliLiter(s) (20 mL/Hr) IV Continuous <Continuous>  ursodiol Capsule 300 milliGRAM(s) Oral two times a day with meals    MEDICATIONS  (PRN):  acetaminophen   Tablet .. 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  Pre Allogeneic PBSCT day +2  HPC transplant today - continue transplant hydration for 24 hours post infusion of cells   H/o subdural hematomas on Keppra;   Baseline CT Head non-con, no acute intracranial abnormality, possible sinusitis   Cough x 3 weeks, CXR clear, start Tessalon 100 mg PRN, RVP (-)  + UTI on admission - started on cipro, febrile this AM, pancx, CXR pending and Cipro switched to Cefepime  Chronic back pain- Dilaudid 0.5 mg IVP q 4 hours PRN  Mild transaminitis, monitor  Fever to 102.9 overnight, would give Solumedrol 1 mg/kg IVP once if temp rises to 104F    1. Infectious Disease:   acyclovir   Oral Tab/Cap 400 milliGRAM(s) Oral every 8 hours  ciprofloxacin     Tablet 500 milliGRAM(s) Oral every 12 hours  clotrimazole Lozenge 1 Lozenge Oral five times a day  fluconAZOLE   Tablet 400 milliGRAM(s) Oral daily    2. VOD Prophylaxis: Actigall, Glutamine, Heparin (dosed at 100 units / kg / day)     3. GI Prophylaxis:    pantoprazole    Tablet 40 milliGRAM(s) Oral before breakfast    4. Mouthcare - NS / NaHCO3 rinses, Mycelex, Caphosol; Skin care     5. GVHD prophylaxis   CTX days + 3, + 4   , MMF to start on day + 5     6. Transfuse & replete electrolytes prn     7. IV hydration, daily weights, strict I&O, prn diuresis   Continue transplant hydration  Lasix 40 mg IVP once in AM, reassess need in PM  KCl 20 meq x 2 doses    8. PO intake as tolerated, nutrition follow up as needed, MVI, folic acid     9. Antiemetics, anti-diarrhea medications:   ondansetron Injectable 8 milliGRAM(s) IV Push every 8 hours  LORazepam   Injectable 1 milliGRAM(s) IV Push every 6 hours PRN  metoclopramide Injectable 10 milliGRAM(s) IV Push every 6 hours  aprepitant 80 milliGRAM(s) Oral every 24 hours    10. OOB as tolerated, physical therapy consult if needed     11. Monitor coags / fibrinogen 2x week, vitamin K as needed     12. Monitor closely for clinical changes, monitor for fevers     13. Emotional support provided, plan of care discussed and questions addressed     14. Patient education done regarding plan of care, restrictions and discharge planning     15. Continue regular social work input     I have written the above note for Dr. Logan who performed service with me in the room.   Antonia Camarillo ANP-BC ([phone])    I have seen and examined patient with NP, I agree with above note as scribed. 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    S: Patient seen and examined with HPC Transplant Team:   + intermittent fever and chills  + intermittent nausea  + fatigue   + occasional cough   + chronic LB/sciatic pain    Denies mouth / tongue / throat pain, dyspnea, vomiting, diarrhea, abdominal pain     Vital Signs Last 24 Hrs  T(C): 37.8 (15 Feb 2019 06:15), Max: 39.4 (2019 21:40)  T(F): 100 (15 Feb 2019 06:15), Max: 102.9 (2019 21:40)  HR: 95 (15 Feb 2019 05:00) (88 - 101)  BP: 112/64 (15 Feb 2019 05:00) (112/64 - 138/80)  BP(mean): --  RR: 21 (15 Feb 2019 05:00) (18 - 21)  SpO2: 100% (15 Feb 2019 05:00) (96% - 100%)    Admit weight: 111.4 kg  Today's weight: 112.5 kg    I&O's Summary    2019 07:01  -  15 Feb 2019 07:00  --------------------------------------------------------  IN: 4735.9 mL / OUT: 5550 mL / NET: -814.1 mL    PE:   Oropharynx: no erythema or ulcers  Oral Mucositis: n/a                                                       stGstrstastdstest:st st1st CVS: S1S2, RRR  Lungs: fine bibasilar crackles, otherwise clear  Abdomen: soft, NT/ND, normoactive BS x 4Q  Extremities: no edema  Gastric Mucositis: n/a                                                 stGstrstastdstest:st st1st Intestinal Mucositis: n/a                                             stGstrstastdstest:st st1st Skin: no rash  TLC: C/D/I  Neuro: nonfocal  Pain: none    Labs:                         11.4   1.4   )-----------( 52       ( 2019 06:54 )             34.3     02-14    141  |  102  |  12  ----------------------------<  87  3.5   |  27  |  0.76    Ca    9.4      2019 06:54  Phos  2.9       Mg     1.8         TPro  6.0  /  Alb  3.4  /  TBili  0.2  /  DBili  x   /  AST  45<H>  /  ALT  51<H>  /  AlkPhos  213<H>      CAPILLARY BLOOD GLUCOSE    PT/INR - ( 2019 06:54 )   PT: 9.8 sec;   INR: 0.86 ratio      PTT - ( 2019 06:54 )  PTT:44.1 sec  Urinalysis Basic - ( 2019 08:56 )    Color: Light Yellow / Appearance: Clear / S.012 / pH: x  Gluc: x / Ketone: Negative  / Bili: Negative / Urobili: Negative   Blood: x / Protein: Negative / Nitrite: Negative   Leuk Esterase: Moderate / RBC: 2 /hpf / WBC 16 /HPF   Sq Epi: x / Non Sq Epi: 1 /hpf / Bacteria: Negative    Cultures:   Blood and urine cultures pending from     Radiology:   from: Xray Chest 1 View- PORTABLE-Urgent (19 @ 09:12) >  IMPRESSION:    1.  The lungs are clear.    EXAM:  CT BRAIN                        PROCEDURE DATE:  2019    IMPRESSION:  No acute intracranial abnormality is noted. No subdural collections are   visualized. If the patient has new and persistent symptoms, consider   follow-up brain MRI with and without contrast, if there are no MRI or   contrast contraindications.  New moderate mucosal thickening involves the paranasal sinuses. Correlate   for possible sinusitis    EXAM:  XR CHEST PA LAT 2V                        PROCEDURE DATE:  2019    IMPRESSION:  1.  Clear lungs    MEDICATIONS  (STANDING):  acetaminophen   Tablet .. 650 milliGRAM(s) Oral every 6 hours  acetaminophen   Tablet .. 650 milliGRAM(s) Oral <User Schedule>  acyclovir   Oral Tab/Cap 400 milliGRAM(s) Oral every 8 hours  aprepitant 80 milliGRAM(s) Oral every 24 hours  benzonatate 100 milliGRAM(s) Oral every 8 hours  Biotene Dry Mouth Oral Rinse 5 milliLiter(s) Swish and Spit five times a day  ciprofloxacin     Tablet 500 milliGRAM(s) Oral every 12 hours  clotrimazole Lozenge 1 Lozenge Oral five times a day  diphenhydrAMINE   Injectable 25 milliGRAM(s) IV Push every 3 weeks  fluconAZOLE   Tablet 400 milliGRAM(s) Oral daily  folic acid 1 milliGRAM(s) Oral daily  heparin  Infusion 464 Unit(s)/Hr (4.64 mL/Hr) IV Continuous <Continuous>  immune   globulin 10% (GAMMAGARD) IVPB 20 Gram(s) IV Intermittent <User Schedule>  levETIRAcetam 500 milliGRAM(s) Oral two times a day  losartan 100 milliGRAM(s) Oral daily  metoclopramide Injectable 10 milliGRAM(s) IV Push every 6 hours  multivitamin 1 Tablet(s) Oral daily  ondansetron Injectable 8 milliGRAM(s) IV Push every 8 hours  palifermin Injectable  (eMAR) 6660 MICROGram(s) IV Push every 24 hours  pantoprazole    Tablet 40 milliGRAM(s) Oral before breakfast  polyethylene glycol 3350 17 Gram(s) Oral daily  sodium bicarbonate Mouth Rinse 10 milliLiter(s) Swish and Spit five times a day  sodium chloride 0.45% 1000 milliLiter(s) (150 mL/Hr) IV Continuous <Continuous>  sodium chloride 0.45%. 1000 milliLiter(s) (200 mL/Hr) IV Continuous <Continuous>  sodium chloride 0.9%. 1000 milliLiter(s) (20 mL/Hr) IV Continuous <Continuous>  ursodiol Capsule 300 milliGRAM(s) Oral two times a day with meals    MEDICATIONS  (PRN):  acetaminophen   Tablet .. 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  Pre Allogeneic PBSCT day +2  HPC transplant today - continue transplant hydration for 24 hours post infusion of cells   H/o subdural hematomas on Keppra;   Baseline CT Head non-con, no acute intracranial abnormality, possible sinusitis   Cough x 3 weeks, CXR clear, continue Tessalon 100 mg PRN, RVP (-)  + UTI on admission - started on cipro, febrile this AM, pancx, CXR pending and Cipro switched to Cefepime  Chronic back pain- Dilaudid 0.5 mg IVP q 4 hours PRN  Mild transaminitis, monitor  Fever to 102.9 overnight, would give Solumedrol 1 mg/kg IVP once if temp rises to 104 F,  will receive post-transplant Cytoxan tomorrow.    1. Infectious Disease:   acyclovir   Oral Tab/Cap 400 milliGRAM(s) Oral every 8 hours  ciprofloxacin     Tablet 500 milliGRAM(s) Oral every 12 hours  clotrimazole Lozenge 1 Lozenge Oral five times a day  fluconAZOLE   Tablet 400 milliGRAM(s) Oral daily    2. VOD Prophylaxis: Actigall, Glutamine, Heparin (dosed at 100 units / kg / day)     3. GI Prophylaxis:    pantoprazole    Tablet 40 milliGRAM(s) Oral before breakfast    4. Mouthcare - NS / NaHCO3 rinses, Mycelex, Caphosol; Skin care     5. GVHD prophylaxis   CTX days + 3, + 4   , MMF to start on day + 5     6. Transfuse & replete electrolytes prn     7. IV hydration, daily weights, strict I&O, prn diuresis   To start CTX hydration tonight  No diuresis today    8. PO intake as tolerated, nutrition follow up as needed, MVI, folic acid     9. Antiemetics, anti-diarrhea medications:   ondansetron Injectable 8 milliGRAM(s) IV Push every 8 hours  LORazepam   Injectable 1 milliGRAM(s) IV Push every 6 hours PRN  metoclopramide Injectable 10 milliGRAM(s) IV Push every 6 hours  aprepitant 80 milliGRAM(s) Oral every 24 hours    10. OOB as tolerated, physical therapy consult if needed     11. Monitor coags / fibrinogen 2x week, vitamin K as needed     12. Monitor closely for clinical changes, monitor for fevers     13. Emotional support provided, plan of care discussed and questions addressed     14. Patient education done regarding plan of care, restrictions and discharge planning     15. Continue regular social work input     I have written the above note for Dr. Logan who performed service with me in the room.   Antonia Camarillo, ANP-BC ([phone])    I have seen and examined patient with NP, I agree with above note as scribed. 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    S: Patient seen and examined with HPC Transplant Team:   + intermittent fever and chills  + intermittent nausea  + fatigue   + occasional cough   + chronic LB/sciatic pain    Denies mouth / tongue / throat pain, dyspnea, vomiting, diarrhea, abdominal pain     Vital Signs Last 24 Hrs  T(C): 37.8 (15 Feb 2019 06:15), Max: 39.4 (2019 21:40)  T(F): 100 (15 Feb 2019 06:15), Max: 102.9 (2019 21:40)  HR: 95 (15 Feb 2019 05:00) (88 - 101)  BP: 112/64 (15 Feb 2019 05:00) (112/64 - 138/80)  BP(mean): --  RR: 21 (15 Feb 2019 05:00) (18 - 21)  SpO2: 100% (15 Feb 2019 05:00) (96% - 100%)    Admit weight: 111.4 kg  Today's weight: 112.5 kg    I&O's Summary    2019 07:01  -  15 Feb 2019 07:00  --------------------------------------------------------  IN: 4735.9 mL / OUT: 5550 mL / NET: -814.1 mL    PE:   Oropharynx: no erythema or ulcers  Oral Mucositis: n/a                                                       stGstrstastdstest:st st1st CVS: S1S2, RRR  Lungs: fine bibasilar crackles, otherwise clear  Abdomen: soft, NT/ND, normoactive BS x 4Q  Extremities: no edema  Gastric Mucositis: n/a                                                 stGstrstastdstest:st st1st Intestinal Mucositis: n/a                                             stGstrstastdstest:st st1st Skin: no rash  TLC: C/D/I  Neuro: nonfocal  Pain: none    Labs:                         11.4   1.4   )-----------( 52       ( 2019 06:54 )             34.3     02-14    141  |  102  |  12  ----------------------------<  87  3.5   |  27  |  0.76    Ca    9.4      2019 06:54  Phos  2.9       Mg     1.8         TPro  6.0  /  Alb  3.4  /  TBili  0.2  /  DBili  x   /  AST  45<H>  /  ALT  51<H>  /  AlkPhos  213<H>      CAPILLARY BLOOD GLUCOSE    PT/INR - ( 2019 06:54 )   PT: 9.8 sec;   INR: 0.86 ratio      PTT - ( 2019 06:54 )  PTT:44.1 sec  Urinalysis Basic - ( 2019 08:56 )    Color: Light Yellow / Appearance: Clear / S.012 / pH: x  Gluc: x / Ketone: Negative  / Bili: Negative / Urobili: Negative   Blood: x / Protein: Negative / Nitrite: Negative   Leuk Esterase: Moderate / RBC: 2 /hpf / WBC 16 /HPF   Sq Epi: x / Non Sq Epi: 1 /hpf / Bacteria: Negative    Cultures:   Blood and urine cultures pending from     Radiology:   from: Xray Chest 1 View- PORTABLE-Urgent (19 @ 09:12) >  IMPRESSION:    1.  The lungs are clear.    EXAM:  CT BRAIN                        PROCEDURE DATE:  2019    IMPRESSION:  No acute intracranial abnormality is noted. No subdural collections are   visualized. If the patient has new and persistent symptoms, consider   follow-up brain MRI with and without contrast, if there are no MRI or   contrast contraindications.  New moderate mucosal thickening involves the paranasal sinuses. Correlate   for possible sinusitis    EXAM:  XR CHEST PA LAT 2V                        PROCEDURE DATE:  2019    IMPRESSION:  1.  Clear lungs    MEDICATIONS  (STANDING):  acetaminophen   Tablet .. 650 milliGRAM(s) Oral every 6 hours  acetaminophen   Tablet .. 650 milliGRAM(s) Oral <User Schedule>  acyclovir   Oral Tab/Cap 400 milliGRAM(s) Oral every 8 hours  aprepitant 80 milliGRAM(s) Oral every 24 hours  benzonatate 100 milliGRAM(s) Oral every 8 hours  Biotene Dry Mouth Oral Rinse 5 milliLiter(s) Swish and Spit five times a day  ciprofloxacin     Tablet 500 milliGRAM(s) Oral every 12 hours  clotrimazole Lozenge 1 Lozenge Oral five times a day  diphenhydrAMINE   Injectable 25 milliGRAM(s) IV Push every 3 weeks  fluconAZOLE   Tablet 400 milliGRAM(s) Oral daily  folic acid 1 milliGRAM(s) Oral daily  heparin  Infusion 464 Unit(s)/Hr (4.64 mL/Hr) IV Continuous <Continuous>  immune   globulin 10% (GAMMAGARD) IVPB 20 Gram(s) IV Intermittent <User Schedule>  levETIRAcetam 500 milliGRAM(s) Oral two times a day  losartan 100 milliGRAM(s) Oral daily  metoclopramide Injectable 10 milliGRAM(s) IV Push every 6 hours  multivitamin 1 Tablet(s) Oral daily  ondansetron Injectable 8 milliGRAM(s) IV Push every 8 hours  palifermin Injectable  (eMAR) 6660 MICROGram(s) IV Push every 24 hours  pantoprazole    Tablet 40 milliGRAM(s) Oral before breakfast  polyethylene glycol 3350 17 Gram(s) Oral daily  sodium bicarbonate Mouth Rinse 10 milliLiter(s) Swish and Spit five times a day  sodium chloride 0.45% 1000 milliLiter(s) (150 mL/Hr) IV Continuous <Continuous>  sodium chloride 0.45%. 1000 milliLiter(s) (200 mL/Hr) IV Continuous <Continuous>  sodium chloride 0.9%. 1000 milliLiter(s) (20 mL/Hr) IV Continuous <Continuous>  ursodiol Capsule 300 milliGRAM(s) Oral two times a day with meals    MEDICATIONS  (PRN):  acetaminophen   Tablet .. 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  Pre Allogeneic PBSCT day +2  HPC transplant today - continue transplant hydration for 24 hours post infusion of cells   H/o subdural hematomas on Keppra;   Baseline CT Head non-con, no acute intracranial abnormality, possible sinusitis   Cough x 3 weeks, CXR clear, continue Tessalon 100 mg PRN, RVP (-)  + UTI on admission - started on cipro, febrile this AM, pancx, CXR pending and Cipro switched to Cefepime  Chronic back pain- Dilaudid 0.5 mg IVP q 4 hours PRN  Mild transaminitis, monitor  Fever to 102.9 overnight, would give Solumedrol 1 mg/kg IVP once if temp rises to 104 F,  will receive post-transplant Cytoxan tomorrow.    1. Infectious Disease:   acyclovir   Oral Tab/Cap 400 milliGRAM(s) Oral every 8 hours  ciprofloxacin     Tablet 500 milliGRAM(s) Oral every 12 hours  clotrimazole Lozenge 1 Lozenge Oral five times a day  fluconAZOLE   Tablet 400 milliGRAM(s) Oral daily    2. VOD Prophylaxis: Actigall, Glutamine, Heparin (dosed at 100 units / kg / day)     3. GI Prophylaxis:    pantoprazole    Tablet 40 milliGRAM(s) Oral before breakfast    4. Mouthcare - NS / NaHCO3 rinses, Mycelex, Caphosol; Skin care     5. GVHD prophylaxis   CTX days + 3, + 4   , MMF to start on day + 5     6. Transfuse & replete electrolytes prn     7. IV hydration, daily weights, strict I&O, prn diuresis   To start CTX hydration tonight  Lasix 20 mg IVP once today    8. PO intake as tolerated, nutrition follow up as needed, MVI, folic acid     9. Antiemetics, anti-diarrhea medications:   ondansetron Injectable 8 milliGRAM(s) IV Push every 8 hours  LORazepam   Injectable 1 milliGRAM(s) IV Push every 6 hours PRN  metoclopramide Injectable 10 milliGRAM(s) IV Push every 6 hours  aprepitant 80 milliGRAM(s) Oral every 24 hours    10. OOB as tolerated, physical therapy consult if needed     11. Monitor coags / fibrinogen 2x week, vitamin K as needed     12. Monitor closely for clinical changes, monitor for fevers     13. Emotional support provided, plan of care discussed and questions addressed     14. Patient education done regarding plan of care, restrictions and discharge planning     15. Continue regular social work input     I have written the above note for Dr. Logan who performed service with me in the room.   Antonia Camarillo, ANP-BC ([phone])    I have seen and examined patient with NP, I agree with above note as scribed. 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    S: Patient seen and examined with HPC Transplant Team:   + intermittent fever and chills  + intermittent nausea  + fatigue   + occasional cough   + chronic LB/sciatic pain    Denies mouth / tongue / throat pain, dyspnea, vomiting, diarrhea, abdominal pain     Vital Signs Last 24 Hrs  T(C): 37.8 (15 Feb 2019 06:15), Max: 39.4 (2019 21:40)  T(F): 100 (15 Feb 2019 06:15), Max: 102.9 (2019 21:40)  HR: 95 (15 Feb 2019 05:00) (88 - 101)  BP: 112/64 (15 Feb 2019 05:00) (112/64 - 138/80)  BP(mean): --  RR: 21 (15 Feb 2019 05:00) (18 - 21)  SpO2: 100% (15 Feb 2019 05:00) (96% - 100%)    Admit weight: 111.4 kg  Today's weight: 112.5 kg    I&O's Summary    2019 07:01  -  15 Feb 2019 07:00  --------------------------------------------------------  IN: 4735.9 mL / OUT: 5550 mL / NET: -814.1 mL    PE:   Oropharynx: no erythema or ulcers  Oral Mucositis: n/a                                                       stGstrstastdstest:st st1st CVS: S1S2, RRR  Lungs: fine bibasilar crackles, otherwise clear  Abdomen: soft, NT/ND, normoactive BS x 4Q  Extremities: no edema  Gastric Mucositis: n/a                                                 stGstrstastdstest:st st1st Intestinal Mucositis: n/a                                             stGstrstastdstest:st st1st Skin: no rash  TLC: C/D/I  Neuro: nonfocal  Pain: none    Labs:                         11.4   1.4   )-----------( 52       ( 2019 06:54 )             34.3     02-14    141  |  102  |  12  ----------------------------<  87  3.5   |  27  |  0.76    Ca    9.4      2019 06:54  Phos  2.9       Mg     1.8         TPro  6.0  /  Alb  3.4  /  TBili  0.2  /  DBili  x   /  AST  45<H>  /  ALT  51<H>  /  AlkPhos  213<H>      CAPILLARY BLOOD GLUCOSE    PT/INR - ( 2019 06:54 )   PT: 9.8 sec;   INR: 0.86 ratio      PTT - ( 2019 06:54 )  PTT:44.1 sec  Urinalysis Basic - ( 2019 08:56 )    Color: Light Yellow / Appearance: Clear / S.012 / pH: x  Gluc: x / Ketone: Negative  / Bili: Negative / Urobili: Negative   Blood: x / Protein: Negative / Nitrite: Negative   Leuk Esterase: Moderate / RBC: 2 /hpf / WBC 16 /HPF   Sq Epi: x / Non Sq Epi: 1 /hpf / Bacteria: Negative    Cultures:   Blood and urine cultures pending from     Radiology:   from: Xray Chest 1 View- PORTABLE-Urgent (19 @ 09:12) >  IMPRESSION:    1.  The lungs are clear.    EXAM:  CT BRAIN                        PROCEDURE DATE:  2019    IMPRESSION:  No acute intracranial abnormality is noted. No subdural collections are   visualized. If the patient has new and persistent symptoms, consider   follow-up brain MRI with and without contrast, if there are no MRI or   contrast contraindications.  New moderate mucosal thickening involves the paranasal sinuses. Correlate   for possible sinusitis    EXAM:  XR CHEST PA LAT 2V                        PROCEDURE DATE:  2019    IMPRESSION:  1.  Clear lungs    MEDICATIONS  (STANDING):  acetaminophen   Tablet .. 650 milliGRAM(s) Oral every 6 hours  acetaminophen   Tablet .. 650 milliGRAM(s) Oral <User Schedule>  acyclovir   Oral Tab/Cap 400 milliGRAM(s) Oral every 8 hours  aprepitant 80 milliGRAM(s) Oral every 24 hours  benzonatate 100 milliGRAM(s) Oral every 8 hours  Biotene Dry Mouth Oral Rinse 5 milliLiter(s) Swish and Spit five times a day  ciprofloxacin     Tablet 500 milliGRAM(s) Oral every 12 hours  clotrimazole Lozenge 1 Lozenge Oral five times a day  diphenhydrAMINE   Injectable 25 milliGRAM(s) IV Push every 3 weeks  fluconAZOLE   Tablet 400 milliGRAM(s) Oral daily  folic acid 1 milliGRAM(s) Oral daily  heparin  Infusion 464 Unit(s)/Hr (4.64 mL/Hr) IV Continuous <Continuous>  immune   globulin 10% (GAMMAGARD) IVPB 20 Gram(s) IV Intermittent <User Schedule>  levETIRAcetam 500 milliGRAM(s) Oral two times a day  losartan 100 milliGRAM(s) Oral daily  metoclopramide Injectable 10 milliGRAM(s) IV Push every 6 hours  multivitamin 1 Tablet(s) Oral daily  ondansetron Injectable 8 milliGRAM(s) IV Push every 8 hours  palifermin Injectable  (eMAR) 6660 MICROGram(s) IV Push every 24 hours  pantoprazole    Tablet 40 milliGRAM(s) Oral before breakfast  polyethylene glycol 3350 17 Gram(s) Oral daily  sodium bicarbonate Mouth Rinse 10 milliLiter(s) Swish and Spit five times a day  sodium chloride 0.45% 1000 milliLiter(s) (150 mL/Hr) IV Continuous <Continuous>  sodium chloride 0.45%. 1000 milliLiter(s) (200 mL/Hr) IV Continuous <Continuous>  sodium chloride 0.9%. 1000 milliLiter(s) (20 mL/Hr) IV Continuous <Continuous>  ursodiol Capsule 300 milliGRAM(s) Oral two times a day with meals    MEDICATIONS  (PRN):  acetaminophen   Tablet .. 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  Pre Allogeneic PBSCT day +2  HPC transplant today - continue transplant hydration for 24 hours post infusion of cells   H/o subdural hematomas on [place];   Baseline CT Head non-con, no acute intracranial abnormality, possible sinusitis   Cough x 3 weeks, CXR clear, continue Tessalon 100 mg PRN, RVP (-)  + UTI on admission - started on cipro, febrile this AM, pancx, CXR pending and Cipro switched to Cefepime  Chronic back pain- Dilaudid 0.5 mg IVP q 4 hours PRN  Mild transaminitis, monitor  Fever to 102.9 overnight, would give Solumedrol 1 mg/kg IVP once if temp rises to 104 F,  will receive post-transplant Cytoxan tomorrow.    1. Infectious Disease:   acyclovir   Oral Tab/Cap 400 milliGRAM(s) Oral every 8 hours  ciprofloxacin     Tablet 500 milliGRAM(s) Oral every 12 hours  clotrimazole Lozenge 1 Lozenge Oral five times a day  fluconAZOLE   Tablet 400 milliGRAM(s) Oral daily    2. VOD Prophylaxis: Actigall, Glutamine, Heparin (dosed at 100 units / kg / day)     3. GI Prophylaxis:    pantoprazole    Tablet 40 milliGRAM(s) Oral before breakfast    4. Mouthcare - NS / NaHCO3 rinses, Mycelex, Caphosol; Skin care     5. GVHD prophylaxis   CTX days + 3, + 4   , MMF to start on day + 5     6. Transfuse & replete electrolytes prn   KCl 20 meq IV x 3 doses    7. IV hydration, daily weights, strict I&O, prn diuresis   To start CTX hydration tonight  Lasix 20 mg IVP once today    8. PO intake as tolerated, nutrition follow up as needed, MVI, folic acid     9. Antiemetics, anti-diarrhea medications:   ondansetron Injectable 8 milliGRAM(s) IV Push every 8 hours  LORazepam   Injectable 1 milliGRAM(s) IV Push every 6 hours PRN  metoclopramide Injectable 10 milliGRAM(s) IV Push every 6 hours  aprepitant 80 milliGRAM(s) Oral every 24 hours    10. OOB as tolerated, physical therapy consult if needed     11. Monitor coags / fibrinogen 2x week, vitamin K as needed     12. Monitor closely for clinical changes, monitor for fevers     13. Emotional support provided, plan of care discussed and questions addressed     14. Patient education done regarding plan of care, restrictions and discharge planning     15. Continue regular social work input     I have written the above note for Dr. Logan who performed service with me in the room.   Antonia Camarillo, ANP-BC ([phone])    I have seen and examined patient with NP, I agree with above note as scribed. 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    S: Patient seen and examined with HPC Transplant Team:   + intermittent fever and chills  + intermittent nausea  + fatigue   + occasional cough   + chronic LB/sciatic pain    Denies mouth / tongue / throat pain, dyspnea, vomiting, diarrhea, abdominal pain     Vital Signs Last 24 Hrs  T(C): 37.8 (15 Feb 2019 06:15), Max: 39.4 (2019 21:40)  T(F): 100 (15 Feb 2019 06:15), Max: 102.9 (2019 21:40)  HR: 95 (15 Feb 2019 05:00) (88 - 101)  BP: 112/64 (15 Feb 2019 05:00) (112/64 - 138/80)  BP(mean): --  RR: 21 (15 Feb 2019 05:00) (18 - 21)  SpO2: 100% (15 Feb 2019 05:00) (96% - 100%)    Admit weight: 111.4 kg  Today's weight: 112.5 kg    I&O's Summary    2019 07:01  -  15 Feb 2019 07:00  --------------------------------------------------------  IN: 4735.9 mL / OUT: 5550 mL / NET: -814.1 mL    PE:   Oropharynx: no erythema or ulcers  Oral Mucositis: n/a                                                       stGstrstastdstest:st st1st CVS: S1S2, RRR  Lungs: fine bibasilar crackles  Abdomen: soft, NT/ND, normoactive BS x 4Q  Extremities: no edema  Gastric Mucositis: n/a                                                 stGstrstastdstest:st st1st Intestinal Mucositis: n/a                                             stGstrstastdstest:st st1st Skin: no rash  TLC: C/D/I  Neuro: nonfocal  Pain: none    Labs:                         11.4   1.4   )-----------( 52       ( 2019 06:54 )             34.3     02-14    141  |  102  |  12  ----------------------------<  87  3.5   |  27  |  0.76    Ca    9.4      2019 06:54  Phos  2.9       Mg     1.8         TPro  6.0  /  Alb  3.4  /  TBili  0.2  /  DBili  x   /  AST  45<H>  /  ALT  51<H>  /  AlkPhos  213<H>      CAPILLARY BLOOD GLUCOSE    PT/INR - ( 2019 06:54 )   PT: 9.8 sec;   INR: 0.86 ratio      PTT - ( 2019 06:54 )  PTT:44.1 sec  Urinalysis Basic - ( 2019 08:56 )    Color: Light Yellow / Appearance: Clear / S.012 / pH: x  Gluc: x / Ketone: Negative  / Bili: Negative / Urobili: Negative   Blood: x / Protein: Negative / Nitrite: Negative   Leuk Esterase: Moderate / RBC: 2 /hpf / WBC 16 /HPF   Sq Epi: x / Non Sq Epi: 1 /hpf / Bacteria: Negative    Cultures:   Blood and urine cultures pending from     Radiology:   from: Xray Chest 1 View- PORTABLE-Urgent (.19 @ 09:12) >  IMPRESSION:    1.  The lungs are clear.    EXAM:  CT BRAIN                        PROCEDURE DATE:  2019    IMPRESSION:  No acute intracranial abnormality is noted. No subdural collections are   visualized. If the patient has new and persistent symptoms, consider   follow-up brain MRI with and without contrast, if there are no MRI or   contrast contraindications.  New moderate mucosal thickening involves the paranasal sinuses. Correlate   for possible sinusitis    EXAM:  XR CHEST PA LAT 2V                        PROCEDURE DATE:  2019    IMPRESSION:  1.  Clear lungs    MEDICATIONS  (STANDING):  acetaminophen   Tablet .. 650 milliGRAM(s) Oral every 6 hours  acetaminophen   Tablet .. 650 milliGRAM(s) Oral <User Schedule>  acyclovir   Oral Tab/Cap 400 milliGRAM(s) Oral every 8 hours  aprepitant 80 milliGRAM(s) Oral every 24 hours  benzonatate 100 milliGRAM(s) Oral every 8 hours  Biotene Dry Mouth Oral Rinse 5 milliLiter(s) Swish and Spit five times a day  ciprofloxacin     Tablet 500 milliGRAM(s) Oral every 12 hours  clotrimazole Lozenge 1 Lozenge Oral five times a day  diphenhydrAMINE   Injectable 25 milliGRAM(s) IV Push every 3 weeks  fluconAZOLE   Tablet 400 milliGRAM(s) Oral daily  folic acid 1 milliGRAM(s) Oral daily  heparin  Infusion 464 Unit(s)/Hr (4.64 mL/Hr) IV Continuous <Continuous>  immune   globulin 10% (GAMMAGARD) IVPB 20 Gram(s) IV Intermittent <User Schedule>  levETIRAcetam 500 milliGRAM(s) Oral two times a day  losartan 100 milliGRAM(s) Oral daily  metoclopramide Injectable 10 milliGRAM(s) IV Push every 6 hours  multivitamin 1 Tablet(s) Oral daily  ondansetron Injectable 8 milliGRAM(s) IV Push every 8 hours  palifermin Injectable  (eMAR) 6660 MICROGram(s) IV Push every 24 hours  pantoprazole    Tablet 40 milliGRAM(s) Oral before breakfast  polyethylene glycol 3350 17 Gram(s) Oral daily  sodium bicarbonate Mouth Rinse 10 milliLiter(s) Swish and Spit five times a day  sodium chloride 0.45% 1000 milliLiter(s) (150 mL/Hr) IV Continuous <Continuous>  sodium chloride 0.45%. 1000 milliLiter(s) (200 mL/Hr) IV Continuous <Continuous>  sodium chloride 0.9%. 1000 milliLiter(s) (20 mL/Hr) IV Continuous <Continuous>  ursodiol Capsule 300 milliGRAM(s) Oral two times a day with meals    MEDICATIONS  (PRN):  acetaminophen   Tablet .. 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  Pre Allogeneic PBSCT day +2  HPC transplant on day 0 - continue transplant hydration for 24 hours post infusion of cells   H/o subdural hematomas on [facility]  Baseline CT Head non-con, no acute intracranial abnormality, possible sinusitis   Cough x 3 weeks, CXR clear, continue Tessalon 100 mg PRN, RVP (-)  + UTI on admission - started on cipro, febrile this AM, pancx, CXR pending and Cipro switched to Cefepime  Chronic back pain- Dilaudid 0.5 mg IVP q 4 hours PRN  Mild transaminitis, monitor  Fever to 102.9 overnight, would give Solumedrol 1 mg/kg IVP once if temp rises to 104 F,  will receive post-transplant Cytoxan tomorrow.    1. Infectious Disease:   acyclovir   Oral Tab/Cap 400 milliGRAM(s) Oral every 8 hours  ciprofloxacin     Tablet 500 milliGRAM(s) Oral every 12 hours  clotrimazole Lozenge 1 Lozenge Oral five times a day  fluconAZOLE   Tablet 400 milliGRAM(s) Oral daily    2. VOD Prophylaxis: Actigall, Glutamine, Heparin (dosed at 100 units / kg / day)     3. GI Prophylaxis:    pantoprazole    Tablet 40 milliGRAM(s) Oral before breakfast    4. Mouthcare - NS / NaHCO3 rinses, Mycelex, Caphosol; Skin care     5. GVHD prophylaxis   CTX days + 3, + 4   , MMF to start on day + 5     6. Transfuse & replete electrolytes prn   KCl 20 meq IV x 3 doses    7. IV hydration, daily weights, strict I&O, prn diuresis   To start CTX hydration tonight  Lasix 20 mg IVP once today    8. PO intake as tolerated, nutrition follow up as needed, MVI, folic acid     9. Antiemetics, anti-diarrhea medications:   ondansetron Injectable 8 milliGRAM(s) IV Push every 8 hours  LORazepam   Injectable 1 milliGRAM(s) IV Push every 6 hours PRN  metoclopramide Injectable 10 milliGRAM(s) IV Push every 6 hours  aprepitant 80 milliGRAM(s) Oral every 24 hours    10. OOB as tolerated, physical therapy consult if needed     11. Monitor coags / fibrinogen 2x week, vitamin K as needed     12. Monitor closely for clinical changes, monitor for fevers     13. Emotional support provided, plan of care discussed and questions addressed     14. Patient education done regarding plan of care, restrictions and discharge planning     15. Continue regular social work input     I have written the above note for Dr. Logan who performed service with me in the room.   Antonia Camarillo, ANP-BC ([phone])    I have seen and examined patient with NP, I agree with above note as scribed.

## 2019-02-16 LAB
ALBUMIN SERPL ELPH-MCNC: 3.2 G/DL — LOW (ref 3.3–5)
ALP SERPL-CCNC: 180 U/L — HIGH (ref 40–120)
ALT FLD-CCNC: 42 U/L — SIGNIFICANT CHANGE UP (ref 10–45)
ANION GAP SERPL CALC-SCNC: 11 MMOL/L — SIGNIFICANT CHANGE UP (ref 5–17)
APPEARANCE UR: CLEAR — SIGNIFICANT CHANGE UP
AST SERPL-CCNC: 23 U/L — SIGNIFICANT CHANGE UP (ref 10–40)
BILIRUB SERPL-MCNC: 0.3 MG/DL — SIGNIFICANT CHANGE UP (ref 0.2–1.2)
BILIRUB UR-MCNC: NEGATIVE — SIGNIFICANT CHANGE UP
BUN SERPL-MCNC: 6 MG/DL — LOW (ref 7–23)
CALCIUM SERPL-MCNC: 8.5 MG/DL — SIGNIFICANT CHANGE UP (ref 8.4–10.5)
CHLORIDE SERPL-SCNC: 100 MMOL/L — SIGNIFICANT CHANGE UP (ref 96–108)
CO2 SERPL-SCNC: 25 MMOL/L — SIGNIFICANT CHANGE UP (ref 22–31)
COLOR SPEC: COLORLESS — SIGNIFICANT CHANGE UP
CREAT SERPL-MCNC: 0.69 MG/DL — SIGNIFICANT CHANGE UP (ref 0.5–1.3)
DIFF PNL FLD: NEGATIVE — SIGNIFICANT CHANGE UP
GLUCOSE SERPL-MCNC: 92 MG/DL — SIGNIFICANT CHANGE UP (ref 70–99)
GLUCOSE UR QL: NEGATIVE — SIGNIFICANT CHANGE UP
HCT VFR BLD CALC: 31.5 % — LOW (ref 34.5–45)
HGB BLD-MCNC: 10.8 G/DL — LOW (ref 11.5–15.5)
KETONES UR-MCNC: NEGATIVE — SIGNIFICANT CHANGE UP
LDH SERPL L TO P-CCNC: 151 U/L — SIGNIFICANT CHANGE UP (ref 50–242)
LEUKOCYTE ESTERASE UR-ACNC: NEGATIVE — SIGNIFICANT CHANGE UP
MAGNESIUM SERPL-MCNC: 1.7 MG/DL — SIGNIFICANT CHANGE UP (ref 1.6–2.6)
MCHC RBC-ENTMCNC: 34.4 GM/DL — SIGNIFICANT CHANGE UP (ref 32–36)
MCHC RBC-ENTMCNC: 35.5 PG — HIGH (ref 27–34)
MCV RBC AUTO: 103 FL — HIGH (ref 80–100)
NITRITE UR-MCNC: NEGATIVE — SIGNIFICANT CHANGE UP
PH UR: 6 — SIGNIFICANT CHANGE UP (ref 5–8)
PHOSPHATE SERPL-MCNC: 2.1 MG/DL — LOW (ref 2.5–4.5)
PLATELET # BLD AUTO: 23 K/UL — LOW (ref 150–400)
POTASSIUM SERPL-MCNC: 4.1 MMOL/L — SIGNIFICANT CHANGE UP (ref 3.5–5.3)
POTASSIUM SERPL-SCNC: 4.1 MMOL/L — SIGNIFICANT CHANGE UP (ref 3.5–5.3)
PROT SERPL-MCNC: 5.8 G/DL — LOW (ref 6–8.3)
PROT UR-MCNC: NEGATIVE — SIGNIFICANT CHANGE UP
RBC # BLD: 3.05 M/UL — LOW (ref 3.8–5.2)
RBC # FLD: 12.4 % — SIGNIFICANT CHANGE UP (ref 10.3–14.5)
SODIUM SERPL-SCNC: 136 MMOL/L — SIGNIFICANT CHANGE UP (ref 135–145)
SP GR SPEC: 1.01 — LOW (ref 1.01–1.02)
SP GR UR STRIP: 1.01 — LOW (ref 1.01–1.02)
SP GR UR STRIP: 1.01 — SIGNIFICANT CHANGE UP (ref 1.01–1.02)
UROBILINOGEN FLD QL: NEGATIVE — SIGNIFICANT CHANGE UP
WBC # BLD: 0.4 K/UL — CRITICAL LOW (ref 3.8–10.5)
WBC # FLD AUTO: 0.4 K/UL — CRITICAL LOW (ref 3.8–10.5)

## 2019-02-16 PROCEDURE — 99291 CRITICAL CARE FIRST HOUR: CPT

## 2019-02-16 PROCEDURE — 93010 ELECTROCARDIOGRAM REPORT: CPT

## 2019-02-16 RX ORDER — FUROSEMIDE 40 MG
40 TABLET ORAL ONCE
Qty: 0 | Refills: 0 | Status: COMPLETED | OUTPATIENT
Start: 2019-02-16 | End: 2019-02-16

## 2019-02-16 RX ORDER — CYCLOPHOSPHAMIDE 100 MG
3415 VIAL (EA) INTRAVENOUS EVERY 24 HOURS
Qty: 0 | Refills: 0 | Status: COMPLETED | OUTPATIENT
Start: 2019-02-16 | End: 2019-02-17

## 2019-02-16 RX ORDER — MESNA 100 MG/ML
1337 INJECTION, SOLUTION INTRAVENOUS
Qty: 0 | Refills: 0 | Status: COMPLETED | OUTPATIENT
Start: 2019-02-16 | End: 2019-02-17

## 2019-02-16 RX ORDER — MESNA 100 MG/ML
1337 INJECTION, SOLUTION INTRAVENOUS
Qty: 0 | Refills: 0 | Status: COMPLETED | OUTPATIENT
Start: 2019-02-17 | End: 2019-02-18

## 2019-02-16 RX ORDER — POTASSIUM PHOSPHATE, MONOBASIC POTASSIUM PHOSPHATE, DIBASIC 236; 224 MG/ML; MG/ML
15 INJECTION, SOLUTION INTRAVENOUS ONCE
Qty: 0 | Refills: 0 | Status: COMPLETED | OUTPATIENT
Start: 2019-02-16 | End: 2019-02-16

## 2019-02-16 RX ORDER — ACETAMINOPHEN 500 MG
1000 TABLET ORAL ONCE
Qty: 0 | Refills: 0 | Status: COMPLETED | OUTPATIENT
Start: 2019-02-16 | End: 2019-02-16

## 2019-02-16 RX ADMIN — Medication 100 MILLIGRAM(S): at 22:19

## 2019-02-16 RX ADMIN — LEVETIRACETAM 500 MILLIGRAM(S): 250 TABLET, FILM COATED ORAL at 17:52

## 2019-02-16 RX ADMIN — CEFEPIME 100 MILLIGRAM(S): 1 INJECTION, POWDER, FOR SOLUTION INTRAMUSCULAR; INTRAVENOUS at 22:19

## 2019-02-16 RX ADMIN — LEVETIRACETAM 500 MILLIGRAM(S): 250 TABLET, FILM COATED ORAL at 05:27

## 2019-02-16 RX ADMIN — Medication 10 MILLIGRAM(S): at 23:45

## 2019-02-16 RX ADMIN — Medication 335.38 MILLIGRAM(S): at 16:15

## 2019-02-16 RX ADMIN — POTASSIUM PHOSPHATE, MONOBASIC POTASSIUM PHOSPHATE, DIBASIC 62.5 MILLIMOLE(S): 236; 224 INJECTION, SOLUTION INTRAVENOUS at 10:20

## 2019-02-16 RX ADMIN — HYDROMORPHONE HYDROCHLORIDE 0.5 MILLIGRAM(S): 2 INJECTION INTRAMUSCULAR; INTRAVENOUS; SUBCUTANEOUS at 11:00

## 2019-02-16 RX ADMIN — HYDROMORPHONE HYDROCHLORIDE 0.5 MILLIGRAM(S): 2 INJECTION INTRAMUSCULAR; INTRAVENOUS; SUBCUTANEOUS at 19:30

## 2019-02-16 RX ADMIN — Medication 10 MILLIGRAM(S): at 17:51

## 2019-02-16 RX ADMIN — Medication 1 LOZENGE: at 16:38

## 2019-02-16 RX ADMIN — HYDROMORPHONE HYDROCHLORIDE 0.5 MILLIGRAM(S): 2 INJECTION INTRAMUSCULAR; INTRAVENOUS; SUBCUTANEOUS at 00:20

## 2019-02-16 RX ADMIN — Medication 1 MILLIGRAM(S): at 12:26

## 2019-02-16 RX ADMIN — Medication 40 MILLIGRAM(S): at 17:51

## 2019-02-16 RX ADMIN — Medication 650 MILLIGRAM(S): at 06:46

## 2019-02-16 RX ADMIN — HYDROMORPHONE HYDROCHLORIDE 0.5 MILLIGRAM(S): 2 INJECTION INTRAMUSCULAR; INTRAVENOUS; SUBCUTANEOUS at 23:00

## 2019-02-16 RX ADMIN — Medication 1000 MILLIGRAM(S): at 17:30

## 2019-02-16 RX ADMIN — ONDANSETRON 8 MILLIGRAM(S): 8 TABLET, FILM COATED ORAL at 10:28

## 2019-02-16 RX ADMIN — ONDANSETRON 8 MILLIGRAM(S): 8 TABLET, FILM COATED ORAL at 17:52

## 2019-02-16 RX ADMIN — Medication 400 MILLIGRAM(S): at 21:29

## 2019-02-16 RX ADMIN — Medication 100 MILLIGRAM(S): at 05:26

## 2019-02-16 RX ADMIN — HYDROMORPHONE HYDROCHLORIDE 0.5 MILLIGRAM(S): 2 INJECTION INTRAMUSCULAR; INTRAVENOUS; SUBCUTANEOUS at 15:30

## 2019-02-16 RX ADMIN — ONDANSETRON 8 MILLIGRAM(S): 8 TABLET, FILM COATED ORAL at 23:46

## 2019-02-16 RX ADMIN — Medication 10 MILLILITER(S): at 23:46

## 2019-02-16 RX ADMIN — Medication 10 MILLIGRAM(S): at 05:26

## 2019-02-16 RX ADMIN — Medication 100 MILLIGRAM(S): at 13:29

## 2019-02-16 RX ADMIN — Medication 1 LOZENGE: at 08:34

## 2019-02-16 RX ADMIN — NYSTATIN CREAM 1 APPLICATION(S): 100000 CREAM TOPICAL at 05:27

## 2019-02-16 RX ADMIN — Medication 1 LOZENGE: at 12:26

## 2019-02-16 RX ADMIN — MESNA 453.48 MILLIGRAM(S): 100 INJECTION, SOLUTION INTRAVENOUS at 22:12

## 2019-02-16 RX ADMIN — Medication 1000 MILLIGRAM(S): at 21:33

## 2019-02-16 RX ADMIN — HYDROMORPHONE HYDROCHLORIDE 0.5 MILLIGRAM(S): 2 INJECTION INTRAMUSCULAR; INTRAVENOUS; SUBCUTANEOUS at 10:27

## 2019-02-16 RX ADMIN — HYDROMORPHONE HYDROCHLORIDE 0.5 MILLIGRAM(S): 2 INJECTION INTRAMUSCULAR; INTRAVENOUS; SUBCUTANEOUS at 05:50

## 2019-02-16 RX ADMIN — Medication 10 MILLILITER(S): at 20:46

## 2019-02-16 RX ADMIN — HYDROMORPHONE HYDROCHLORIDE 0.5 MILLIGRAM(S): 2 INJECTION INTRAMUSCULAR; INTRAVENOUS; SUBCUTANEOUS at 23:46

## 2019-02-16 RX ADMIN — Medication 650 MILLIGRAM(S): at 11:56

## 2019-02-16 RX ADMIN — CEFEPIME 100 MILLIGRAM(S): 1 INJECTION, POWDER, FOR SOLUTION INTRAMUSCULAR; INTRAVENOUS at 05:25

## 2019-02-16 RX ADMIN — Medication 111 MILLIGRAM(S): at 21:30

## 2019-02-16 RX ADMIN — URSODIOL 300 MILLIGRAM(S): 250 TABLET, FILM COATED ORAL at 17:52

## 2019-02-16 RX ADMIN — Medication 650 MILLIGRAM(S): at 12:30

## 2019-02-16 RX ADMIN — Medication 5 MILLILITER(S): at 23:45

## 2019-02-16 RX ADMIN — Medication 5 MILLILITER(S): at 08:33

## 2019-02-16 RX ADMIN — ONDANSETRON 8 MILLIGRAM(S): 8 TABLET, FILM COATED ORAL at 02:19

## 2019-02-16 RX ADMIN — Medication 5 MILLILITER(S): at 20:46

## 2019-02-16 RX ADMIN — Medication 40 MILLIGRAM(S): at 10:19

## 2019-02-16 RX ADMIN — FLUCONAZOLE 400 MILLIGRAM(S): 150 TABLET ORAL at 12:25

## 2019-02-16 RX ADMIN — Medication 400 MILLIGRAM(S): at 05:26

## 2019-02-16 RX ADMIN — Medication 400 MILLIGRAM(S): at 16:30

## 2019-02-16 RX ADMIN — HYDROMORPHONE HYDROCHLORIDE 0.5 MILLIGRAM(S): 2 INJECTION INTRAMUSCULAR; INTRAVENOUS; SUBCUTANEOUS at 06:10

## 2019-02-16 RX ADMIN — NYSTATIN CREAM 1 APPLICATION(S): 100000 CREAM TOPICAL at 22:20

## 2019-02-16 RX ADMIN — LOSARTAN POTASSIUM 100 MILLIGRAM(S): 100 TABLET, FILM COATED ORAL at 05:27

## 2019-02-16 RX ADMIN — Medication 1 LOZENGE: at 20:46

## 2019-02-16 RX ADMIN — HYDROMORPHONE HYDROCHLORIDE 0.5 MILLIGRAM(S): 2 INJECTION INTRAMUSCULAR; INTRAVENOUS; SUBCUTANEOUS at 00:56

## 2019-02-16 RX ADMIN — Medication 10 MILLILITER(S): at 12:24

## 2019-02-16 RX ADMIN — Medication 400 MILLIGRAM(S): at 13:29

## 2019-02-16 RX ADMIN — APREPITANT 80 MILLIGRAM(S): 80 CAPSULE ORAL at 17:53

## 2019-02-16 RX ADMIN — Medication 1 TABLET(S): at 12:26

## 2019-02-16 RX ADMIN — Medication 10 MILLILITER(S): at 08:32

## 2019-02-16 RX ADMIN — Medication 10 MILLIGRAM(S): at 12:26

## 2019-02-16 RX ADMIN — CEFEPIME 100 MILLIGRAM(S): 1 INJECTION, POWDER, FOR SOLUTION INTRAMUSCULAR; INTRAVENOUS at 13:29

## 2019-02-16 RX ADMIN — Medication 5 MILLILITER(S): at 16:38

## 2019-02-16 RX ADMIN — MESNA 453.48 MILLIGRAM(S): 100 INJECTION, SOLUTION INTRAVENOUS at 16:16

## 2019-02-16 RX ADMIN — HYDROMORPHONE HYDROCHLORIDE 0.5 MILLIGRAM(S): 2 INJECTION INTRAMUSCULAR; INTRAVENOUS; SUBCUTANEOUS at 15:01

## 2019-02-16 RX ADMIN — URSODIOL 300 MILLIGRAM(S): 250 TABLET, FILM COATED ORAL at 08:34

## 2019-02-16 RX ADMIN — HYDROMORPHONE HYDROCHLORIDE 0.5 MILLIGRAM(S): 2 INJECTION INTRAMUSCULAR; INTRAVENOUS; SUBCUTANEOUS at 20:00

## 2019-02-16 RX ADMIN — Medication 10 MILLILITER(S): at 16:37

## 2019-02-16 RX ADMIN — Medication 20 MILLIGRAM(S): at 20:52

## 2019-02-16 RX ADMIN — Medication 1 LOZENGE: at 23:45

## 2019-02-16 RX ADMIN — MESNA 453.48 MILLIGRAM(S): 100 INJECTION, SOLUTION INTRAVENOUS at 20:15

## 2019-02-16 RX ADMIN — Medication 400 MILLIGRAM(S): at 22:18

## 2019-02-16 RX ADMIN — Medication 5 MILLILITER(S): at 12:27

## 2019-02-16 RX ADMIN — NYSTATIN CREAM 1 APPLICATION(S): 100000 CREAM TOPICAL at 13:29

## 2019-02-16 RX ADMIN — PANTOPRAZOLE SODIUM 40 MILLIGRAM(S): 20 TABLET, DELAYED RELEASE ORAL at 05:27

## 2019-02-16 RX ADMIN — Medication 650 MILLIGRAM(S): at 05:46

## 2019-02-16 NOTE — CHART NOTE - NSCHARTNOTEFT_GEN_A_CORE
Called by RN to evaluate Pt. for temp =   (f)     .  Pt seen and evaluated at bedside.  Denies headache, dizziness, visual disturbance, chest pain, cough, SOB, palpitations, abdominal pain, N/V/D , dysuria.   Offers no complaints at present time.      Vital Signs Last 24 Hrs  T(C): 40.2 (16 Feb 2019 21:05), Max: 40.2 (16 Feb 2019 21:05)  T(F): 104.4 (16 Feb 2019 21:05), Max: 104.4 (16 Feb 2019 21:05)  HR: 112 (16 Feb 2019 21:05) (81 - 112)  BP: 106/56 (16 Feb 2019 21:05) (106/56 - 121/78)  BP(mean): --  RR: 20 (16 Feb 2019 21:05) (18 - 20)  SpO2: 100% (16 Feb 2019 21:05) (95% - 100%)    Labs:                        10.8   0.4   )-----------( 23       ( 16 Feb 2019 07:37 )             31.5       02-16    136  |  100  |  6<L>  ----------------------------<  92  4.1   |  25  |  0.69    Ca    8.5      16 Feb 2019 07:35  Phos  2.1     02-16  Mg     1.7     02-16    TPro  5.8<L>  /  Alb  3.2<L>  /  TBili  0.3  /  DBili  x   /  AST  23  /  ALT  42  /  AlkPhos  180<H>  02-16      Antimicrobials:  MEDICATIONS  (STANDING):  acetaminophen   Tablet .. 650 milliGRAM(s) Oral every 6 hours  acetaminophen   Tablet .. 650 milliGRAM(s) Oral <User Schedule>  acetaminophen  IVPB .. 1000 milliGRAM(s) IV Intermittent once  acyclovir   Oral Tab/Cap 400 milliGRAM(s) Oral every 8 hours  aprepitant 80 milliGRAM(s) Oral every 24 hours  benzonatate 100 milliGRAM(s) Oral every 8 hours  Biotene Dry Mouth Oral Rinse 5 milliLiter(s) Swish and Spit five times a day  cefepime   IVPB      cefepime   IVPB 2000 milliGRAM(s) IV Intermittent every 8 hours  clotrimazole Lozenge 1 Lozenge Oral five times a day  cyclophosphamide IVPB (eMAR) 3415 milliGRAM(s) IV Intermittent every 24 hours  diphenhydrAMINE   Injectable 25 milliGRAM(s) IV Push every 3 weeks  fluconAZOLE   Tablet 400 milliGRAM(s) Oral daily  folic acid 1 milliGRAM(s) Oral daily  furosemide   Injectable 20 milliGRAM(s) IV Push every 24 hours  heparin  Infusion 464 Unit(s)/Hr (4.64 mL/Hr) IV Continuous <Continuous>  immune   globulin 10% (GAMMAGARD) IVPB 20 Gram(s) IV Intermittent <User Schedule>  levETIRAcetam 500 milliGRAM(s) Oral two times a day  losartan 100 milliGRAM(s) Oral daily  mesna IVPB (eMAR) 1337 milliGRAM(s) IV Intermittent every 3 hours  methylPREDNISolone sodium succinate Injectable 111 milliGRAM(s) IV Push once  metoclopramide Injectable 10 milliGRAM(s) IV Push every 6 hours  multivitamin 1 Tablet(s) Oral daily  nystatin Powder 1 Application(s) Topical every 8 hours  ondansetron Injectable 8 milliGRAM(s) IV Push every 8 hours  pantoprazole    Tablet 40 milliGRAM(s) Oral before breakfast  polyethylene glycol 3350 17 Gram(s) Oral daily  sodium bicarbonate Mouth Rinse 10 milliLiter(s) Swish and Spit five times a day  sodium chloride 0.45% 1000 milliLiter(s) (255 mL/Hr) IV Continuous <Continuous>  sodium chloride 0.9%. 1000 milliLiter(s) (20 mL/Hr) IV Continuous <Continuous>  ursodiol Capsule 300 milliGRAM(s) Oral two times a day with meals        PE:    General: Alert & Oriented x 3, non toxic, in no acute distress  Neuro: non focal  Cardiac: S1, S2, tachycardic  Pulm: Lungs CTA  GI: Abd soft, NT/ND, + bowel sounds x 4 quadrants  Ext: no edema, + pedal pulses BL  Skin: intact      AP:     1.  Neutropenic fever       - continue with current antimicrobials       - continue antipyretic/cooling measures      - continue IV hydration      - BC x 2      - UA/CS      - f/u panculture results      - cxr      - continue to closely monitor      - f/u with primary team in AM    Balbina Erickson, ANP x Called by RN to evaluate Pt. for temp = 104.4(f) 40.2(c) .  Pt seen and evaluated at bedside.  Denies headache, dizziness, visual disturbance, chest pain, cough, SOB, palpitations, abdominal pain, N/V/D , dysuria.   C/o chronic pain related to sciatica.      Vital Signs Last 24 Hrs  T(C): 40.2 (16 Feb 2019 21:05), Max: 40.2 (16 Feb 2019 21:05)  T(F): 104.4 (16 Feb 2019 21:05), Max: 104.4 (16 Feb 2019 21:05)  HR: 112 (16 Feb 2019 21:05) (81 - 112)  BP: 106/56 (16 Feb 2019 21:05) (106/56 - 121/78)  BP(mean): --  RR: 20 (16 Feb 2019 21:05) (18 - 20)  SpO2: 100% (16 Feb 2019 21:05) (95% - 100%)    Labs:                        10.8   0.4   )-----------( 23       ( 16 Feb 2019 07:37 )             31.5       02-16    136  |  100  |  6<L>  ----------------------------<  92  4.1   |  25  |  0.69    Ca    8.5      16 Feb 2019 07:35  Phos  2.1     02-16  Mg     1.7     02-16    TPro  5.8<L>  /  Alb  3.2<L>  /  TBili  0.3  /  DBili  x   /  AST  23  /  ALT  42  /  AlkPhos  180<H>  02-16    Culture - Urine (02.14.19 @ 14:10)    Specimen Source: .Urine Clean Catch (Midstream)    Culture Results:   <10,000 CFU/ml Normal Urogenital everton present    Culture - Blood (02.14.19 @ 12:00)    Specimen Source: .Blood Blood-Catheter    Culture Results:   No growth to date.    Culture - Blood (02.14.19 @ 12:00)    Specimen Source: .Blood Blood-Catheter    Culture Results:   No growth to date.    CXR 2/14/2019  IMPRESSION: 1.  The lungs are clear.      Antimicrobials:    acyclovir   Oral Tab/Cap 400 milliGRAM(s) Oral every 8 hours  cefepime   IVPB    cefepime   IVPB 2000 milliGRAM(s) IV Intermittent every 8 hours  fluconAZOLE   Tablet 400 milliGRAM(s) Oral daily      PE:    General: Alert & Oriented x 3, non toxic, in no acute distress  Neuro: non focal  Cardiac: S1, S2, tachycardic  Pulm: Lungs CTA  GI: Abd soft, NT/ND, + bowel sounds x 4 quadrants  Ext: no edema, + pedal pulses BL  Skin: intact      AP:     1.  Neutropenic fever       - continue with current antimicrobials       - continue antipyretic/cooling measures       - IV tylenol x 1       - solumedrol 111 mg IVP x 1 (as per primary team signout, 1 mg/kg for fever > 104 (f)      - continue IV hydration      - f/u panculture results      - continue to closely monitor      - f/u with primary team in AM    Balbina Erickson, ANP x Called by RN to evaluate Pt. for temp = 104.4(f) 40.2(c) .  Pt seen and evaluated at bedside.  Denies headache, dizziness, visual disturbance, chest pain, cough, SOB, palpitations, abdominal pain, N/V/D , dysuria.   C/o chronic pain related to sciatica.      Vital Signs Last 24 Hrs  T(C): 40.2 (16 Feb 2019 21:05), Max: 40.2 (16 Feb 2019 21:05)  T(F): 104.4 (16 Feb 2019 21:05), Max: 104.4 (16 Feb 2019 21:05)  HR: 112 (16 Feb 2019 21:05) (81 - 112)  BP: 106/56 (16 Feb 2019 21:05) (106/56 - 121/78)  BP(mean): --  RR: 20 (16 Feb 2019 21:05) (18 - 20)  SpO2: 100% (16 Feb 2019 21:05) (95% - 100%)    Labs:                        10.8   0.4   )-----------( 23       ( 16 Feb 2019 07:37 )             31.5       02-16    136  |  100  |  6<L>  ----------------------------<  92  4.1   |  25  |  0.69    Ca    8.5      16 Feb 2019 07:35  Phos  2.1     02-16  Mg     1.7     02-16    TPro  5.8<L>  /  Alb  3.2<L>  /  TBili  0.3  /  DBili  x   /  AST  23  /  ALT  42  /  AlkPhos  180<H>  02-16    Blood culture x 2 2/16/2019  Urine culture 2/16/2019     Culture - Urine (02.14.19 @ 14:10)    Specimen Source: .Urine Clean Catch (Midstream)    Culture Results:   <10,000 CFU/ml Normal Urogenital everton present    Culture - Blood (02.14.19 @ 12:00)    Specimen Source: .Blood Blood-Catheter    Culture Results:   No growth to date.    Culture - Blood (02.14.19 @ 12:00)    Specimen Source: .Blood Blood-Catheter    Culture Results:   No growth to date.    CXR 2/14/2019  IMPRESSION: 1.  The lungs are clear.      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  Pre Allogeneic PBSCT day +3. Now with neutropenic fever.    1.  Neutropenic fever       - continue with current antimicrobials       - continue antipyretic/cooling measures       - IV tylenol x 1       - solumedrol 111 mg IVP x 1 (as per primary team signout, 1 mg/kg for fever > 104 (f)      - continue IV hydration      - f/u panculture results      - continue to closely monitor      - f/u with primary team in AM    Balbina Erickson, ANP x [phone]

## 2019-02-16 NOTE — PROGRESS NOTE ADULT - SUBJECTIVE AND OBJECTIVE BOX
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    S: Patient seen and examined with HPC Transplant Team:   + intermittent fever and chills  + intermittent nausea  + fatigue   + occasional cough   + chronic LB/sciatic pain    Denies mouth / tongue / throat pain, dyspnea, cough, nausea, vomiting, diarrhea, abdominal pain     O: Vitals:   Vital Signs Last 24 Hrs  T(C): 38.4 (2019 06:45), Max: 39 (2019 05:35)  T(F): 101.1 (2019 06:45), Max: 102.2 (2019 05:35)  HR: 102 (2019 05:35) (81 - 102)  BP: 115/80 (2019 05:35) (115/76 - 143/85)  BP(mean): --  RR: 20 (2019 05:35) (18 - 20)  SpO2: 98% (2019 05:35) (97% - 100%)    Admit weight: 111.4 kg   Daily Weight in k.5 (15 Feb 2019 10:11)  Today's weight:     Intake / Output:   -15 @ 07:01  -  02-16 @ 07:00  --------------------------------------------------------  IN: 3750 mL / OUT: 4030 mL / NET: -280 mL        PE:   Oropharynx: no erythema or ulcers  Oral Mucositis: n/a                                                       stGstrstastdstest:st st1st CVS: S1S2, RRR  Lungs: fine bibasilar crackles  Abdomen: soft, NT/ND, normoactive BS x 4Q  Extremities: no edema  Gastric Mucositis: n/a                                                 stGstrstastdstest:st st1st Intestinal Mucositis: n/a                                             stGstrstastdstest:st st1st Skin: no rash  TLC: C/D/I  Neuro: nonfocal  Pain: none    Labs:   CBC Full  -  ( 2019 07:37 )  WBC Count : 0.4 K/uL  Hemoglobin : 10.8 g/dL  Hematocrit : 31.5 %  Platelet Count - Automated : 23 K/uL  Mean Cell Volume : 103.0 fl  Mean Cell Hemoglobin : 35.5 pg  Mean Cell Hemoglobin Concentration : 34.4 gm/dL  Auto Neutrophil # : x  Auto Lymphocyte # : x  Auto Monocyte # : x  Auto Eosinophil # : x  Auto Basophil # : x  Auto Neutrophil % : x  Auto Lymphocyte % : x  Auto Monocyte % : x  Auto Eosinophil % : x  Auto Basophil % : x                          10.8   0.4   )-----------( 23       ( 2019 07:37 )             31.5     02-15    139  |  101  |  9   ----------------------------<  101<H>  3.2<L>   |  27  |  0.75    Ca    8.7      15 Feb 2019 06:55  Phos  3.0     02-15  Mg     1.7     02-15    TPro  5.6<L>  /  Alb  3.2<L>  /  TBili  0.2  /  DBili  x   /  AST  32  /  ALT  45  /  AlkPhos  181<H>  02-15      LIVER FUNCTIONS - ( 15 Feb 2019 06:55 )  Alb: 3.2 g/dL / Pro: 5.6 g/dL / ALK PHOS: 181 U/L / ALT: 45 U/L / AST: 32 U/L / GGT: x           Cultures:   Culture - Urine (19 @ 14:10)    Specimen Source: .Urine Clean Catch (Midstream)    Culture Results:   <10,000 CFU/ml Normal Urogenital everton present    Culture - Blood (19 @ 12:00)    Specimen Source: .Blood Blood-Catheter    Culture Results:   No growth to date.    Radiology:   < from: Xray Chest 1 View- PORTABLE-Urgent (19 @ 09:12) >  IMPRESSION:  1.  The lungs are clear.    Meds:   Antimicrobials:   acyclovir   Oral Tab/Cap 400 milliGRAM(s) Oral every 8 hours    cefepime   IVPB 2000 milliGRAM(s) IV Intermittent every 8 hours  clotrimazole Lozenge 1 Lozenge Oral five times a day  fluconAZOLE   Tablet 400 milliGRAM(s) Oral daily    Heme / Onc:   heparin  Infusion 464 Unit(s)/Hr IV Continuous <Continuous>    GI:  docusate sodium 100 milliGRAM(s) Oral three times a day PRN  pantoprazole    Tablet 40 milliGRAM(s) Oral before breakfast  polyethylene glycol 3350 17 Gram(s) Oral daily  senna 1 Tablet(s) Oral at bedtime PRN  sodium bicarbonate Mouth Rinse 10 milliLiter(s) Swish and Spit five times a day  ursodiol Capsule 300 milliGRAM(s) Oral two times a day with meals    Cardiovascular:   furosemide   Injectable 20 milliGRAM(s) IV Push every 24 hours  losartan 100 milliGRAM(s) Oral daily    Immunologic:   immune   globulin 10% (GAMMAGARD) IVPB 20 Gram(s) IV Intermittent <User Schedule>    Other medications:   acetaminophen   Tablet .. 650 milliGRAM(s) Oral every 6 hours  acetaminophen   Tablet .. 650 milliGRAM(s) Oral <User Schedule>  aprepitant 80 milliGRAM(s) Oral every 24 hours  benzonatate 100 milliGRAM(s) Oral every 8 hours  Biotene Dry Mouth Oral Rinse 5 milliLiter(s) Swish and Spit five times a day  diphenhydrAMINE   Injectable 25 milliGRAM(s) IV Push every 3 weeks  folic acid 1 milliGRAM(s) Oral daily  levETIRAcetam 500 milliGRAM(s) Oral two times a day  metoclopramide Injectable 10 milliGRAM(s) IV Push every 6 hours  multivitamin 1 Tablet(s) Oral daily  nystatin Powder 1 Application(s) Topical every 8 hours  ondansetron Injectable 8 milliGRAM(s) IV Push every 8 hours  sodium chloride 0.45% 1000 milliLiter(s) IV Continuous <Continuous>  sodium chloride 0.9%. 1000 milliLiter(s) IV Continuous <Continuous>      PRN:   acetaminophen   Tablet .. 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  Pre Allogeneic PBSCT day +3  HPC transplant on day 0 - continue transplant hydration for 24 hours post infusion of cells   H/o subdural hematomas on \[facility]\""ra  Baseline CT Head non-con, no acute intracranial abnormality, possible sinusitis   Cough x 3 weeks, CXR clear, continue Tessalon 100 mg PRN, RVP (-)  + UTI on admission - started on cipro, febrile this AM, pancx, CXR pending and Cipro switched to Cefepime  Chronic back pain- Dilaudid 0.5 mg IVP q 4 hours PRN  Mild transaminitis, monitor  Fever to 102.9 overnight, would give Solumedrol 1 mg/kg IVP once if temp rises to 104 F,  will receive post-transplant Cytoxan tomorrow.      1. Infectious Disease:   acyclovir   Oral Tab/Cap 400 milliGRAM(s) Oral every 8 hours    cefepime   IVPB 2000 milliGRAM(s) IV Intermittent every 8 hours  clotrimazole Lozenge 1 Lozenge Oral five times a day  fluconAZOLE   Tablet 400 milliGRAM(s) Oral daily    2. VOD Prophylaxis: Actigall, Glutamine, Heparin (dosed at 100 units / kg / day)     3. GI Prophylaxis:    pantoprazole    Tablet 40 milliGRAM(s) Oral before breakfast    4. Mouthcare - NS / NaHCO3 rinses, Mycelex, Caphosol; Skin care     5. GVHD prophylaxis   CTX days + 3, + 4   , MMF to start on day + 5     6. Transfuse & replete electrolytes prn   KCl 20 meq IV x 3 doses    7. IV hydration, daily weights, strict I&O, prn diuresis   Continue CTX hydration tonight    8. PO intake as tolerated, nutrition follow up as needed, MVI, folic acid     9. Antiemetics, anti-diarrhea medications:   ondansetron Injectable 8 milliGRAM(s) IV Push every 8 hours  LORazepam   Injectable 1 milliGRAM(s) IV Push every 6 hours PRN  metoclopramide Injectable 10 milliGRAM(s) IV Push every 6 hours  aprepitant 80 milliGRAM(s) Oral every 24 hours    10. OOB as tolerated, physical therapy consult if needed     11. Monitor coags / fibrinogen 2x week, vitamin K as needed     12. Monitor closely for clinical changes, monitor for fevers     13. Emotional support provided, plan of care discussed and questions addressed     14. Patient education done regarding plan of care, restrictions and discharge planning     15. Continue regular social work input     I have written the above note for Dr. Velez who performed service with me in the room.   Shelly Clemente NP-C ([phone])    I have seen and examined patient with NP, I agree with above note as scribed. 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    S: Patient seen and examined with HPC Transplant Team:   + intermittent fever and chills  + occasional cough  + chronic LB/sciatic pain  + fatigue     Denies mouth / tongue / throat pain, dyspnea, nausea, vomiting, diarrhea, abdominal pain     O: Vitals:   Vital Signs Last 24 Hrs  T(C): 38.4 (2019 06:45), Max: 39 (2019 05:35)  T(F): 101.1 (2019 06:45), Max: 102.2 (2019 05:35)  HR: 102 (2019 05:35) (81 - 102)  BP: 115/80 (2019 05:35) (115/76 - 143/85)  BP(mean): --  RR: 20 (2019 05:35) (18 - 20)  SpO2: 98% (2019 05:35) (97% - 100%)    Admit weight: 111.4 kg   Daily Weight in k.5 (15 Feb 2019 10:11)  Today's weight: 113.5 kg     Intake / Output:   02-15 @ 07:01  -  02-16 @ 07:00  --------------------------------------------------------  IN: 3750 mL / OUT: 4030 mL / NET: -280 mL    PE:   Oropharynx: no erythema or ulcers  Oral Mucositis: n/a                                                       stGstrstastdstest:st st1st CVS: S1S2, RRR  Lungs: fine bibasilar crackles  Abdomen: soft, NT/ND, normoactive BS x 4Q  Extremities: no edema  Gastric Mucositis: n/a                                                 stGstrstastdstest:st st1st Intestinal Mucositis: n/a                                             stGstrstastdstest:st st1st Skin: no rash  TLC: C/D/I  Neuro: nonfocal  Pain: none    Labs:   CBC Full  -  ( 2019 07:37 )  WBC Count : 0.4 K/uL  Hemoglobin : 10.8 g/dL  Hematocrit : 31.5 %  Platelet Count - Automated : 23 K/uL  Mean Cell Volume : 103.0 fl  Mean Cell Hemoglobin : 35.5 pg  Mean Cell Hemoglobin Concentration : 34.4 gm/dL  Auto Neutrophil # : x  Auto Lymphocyte # : x  Auto Monocyte # : x  Auto Eosinophil # : x  Auto Basophil # : x  Auto Neutrophil % : x  Auto Lymphocyte % : x  Auto Monocyte % : x  Auto Eosinophil % : x  Auto Basophil % : x                          10.8   0.4   )-----------( 23       ( 2019 07:37 )             31.5     02-15    139  |  101  |  9   ----------------------------<  101<H>  3.2<L>   |  27  |  0.75    Ca    8.7      15 Feb 2019 06:55  Phos  3.0     02-15  Mg     1.7     02-15    TPro  5.6<L>  /  Alb  3.2<L>  /  TBili  0.2  /  DBili  x   /  AST  32  /  ALT  45  /  AlkPhos  181<H>  02-15      LIVER FUNCTIONS - ( 15 Feb 2019 06:55 )  Alb: 3.2 g/dL / Pro: 5.6 g/dL / ALK PHOS: 181 U/L / ALT: 45 U/L / AST: 32 U/L / GGT: x           Cultures:   Culture - Urine (19 @ 14:10)    Specimen Source: .Urine Clean Catch (Midstream)    Culture Results:   <10,000 CFU/ml Normal Urogenital everton present    Culture - Blood (19 @ 12:00)    Specimen Source: .Blood Blood-Catheter    Culture Results:   No growth to date.    Radiology:   < from: Xray Chest 1 View- PORTABLE-Urgent (19 @ 09:12) >  IMPRESSION:  1.  The lungs are clear.    Meds:   Antimicrobials:   acyclovir   Oral Tab/Cap 400 milliGRAM(s) Oral every 8 hours    cefepime   IVPB 2000 milliGRAM(s) IV Intermittent every 8 hours  clotrimazole Lozenge 1 Lozenge Oral five times a day  fluconAZOLE   Tablet 400 milliGRAM(s) Oral daily    Heme / Onc:   heparin  Infusion 464 Unit(s)/Hr IV Continuous <Continuous>    GI:  docusate sodium 100 milliGRAM(s) Oral three times a day PRN  pantoprazole    Tablet 40 milliGRAM(s) Oral before breakfast  polyethylene glycol 3350 17 Gram(s) Oral daily  senna 1 Tablet(s) Oral at bedtime PRN  sodium bicarbonate Mouth Rinse 10 milliLiter(s) Swish and Spit five times a day  ursodiol Capsule 300 milliGRAM(s) Oral two times a day with meals    Cardiovascular:   furosemide   Injectable 20 milliGRAM(s) IV Push every 24 hours  losartan 100 milliGRAM(s) Oral daily    Immunologic:   immune   globulin 10% (GAMMAGARD) IVPB 20 Gram(s) IV Intermittent <User Schedule>    Other medications:   acetaminophen   Tablet .. 650 milliGRAM(s) Oral every 6 hours  acetaminophen   Tablet .. 650 milliGRAM(s) Oral <User Schedule>  aprepitant 80 milliGRAM(s) Oral every 24 hours  benzonatate 100 milliGRAM(s) Oral every 8 hours  Biotene Dry Mouth Oral Rinse 5 milliLiter(s) Swish and Spit five times a day  diphenhydrAMINE   Injectable 25 milliGRAM(s) IV Push every 3 weeks  folic acid 1 milliGRAM(s) Oral daily  levETIRAcetam 500 milliGRAM(s) Oral two times a day  metoclopramide Injectable 10 milliGRAM(s) IV Push every 6 hours  multivitamin 1 Tablet(s) Oral daily  nystatin Powder 1 Application(s) Topical every 8 hours  ondansetron Injectable 8 milliGRAM(s) IV Push every 8 hours  sodium chloride 0.45% 1000 milliLiter(s) IV Continuous <Continuous>  sodium chloride 0.9%. 1000 milliLiter(s) IV Continuous <Continuous>      PRN:   acetaminophen   Tablet .. 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  Pre Allogeneic PBSCT day +3  HPC transplant on day 0 - continue transplant hydration for 24 hours post infusion of cells   H/o subdural hematomas on [facility] [place]  Baseline CT Head non-con, no acute intracranial abnormality, possible sinusitis   Cough x 3 weeks, CXR clear, continue Tessalon 100 mg PRN, RVP (-)  + UTI on admission - started on cipro, febrile this AM, pancx, CXR pending and Cipro switched to Cefepime  Chronic back pain- Dilaudid 0.5 mg IVP q 4 hours PRN  Mild transaminitis, monitor  Fever to 102.9 overnight, would give Solumedrol 1 mg/kg IVP once if temp rises to 104 F,  will receive post-transplant Cytoxan tomorrow.    1. Infectious Disease:   acyclovir   Oral Tab/Cap 400 milliGRAM(s) Oral every 8 hours    cefepime   IVPB 2000 milliGRAM(s) IV Intermittent every 8 hours  clotrimazole Lozenge 1 Lozenge Oral five times a day  fluconAZOLE   Tablet 400 milliGRAM(s) Oral daily    2. VOD Prophylaxis: Actigall, Glutamine, Heparin (dosed at 100 units / kg / day)     3. GI Prophylaxis:    pantoprazole    Tablet 40 milliGRAM(s) Oral before breakfast    4. Mouthcare - NS / NaHCO3 rinses, Mycelex, Caphosol; Skin care     5. GVHD prophylaxis   CTX days + 3, + 4   , MMF to start on day + 5     6. Transfuse & replete electrolytes prn     7. IV hydration, daily weights, strict I&O, prn diuresis   Continue CTX hydration   Lasix 40 mg IV x 2    8. PO intake as tolerated, nutrition follow up as needed, MVI, folic acid     9. Antiemetics, anti-diarrhea medications:   ondansetron Injectable 8 milliGRAM(s) IV Push every 8 hours  LORazepam   Injectable 1 milliGRAM(s) IV Push every 6 hours PRN  metoclopramide Injectable 10 milliGRAM(s) IV Push every 6 hours  aprepitant 80 milliGRAM(s) Oral every 24 hours    10. OOB as tolerated, physical therapy consult if needed     11. Monitor coags / fibrinogen 2x week, vitamin K as needed     12. Monitor closely for clinical changes, monitor for fevers     13. Emotional support provided, plan of care discussed and questions addressed     14. Patient education done regarding plan of care, restrictions and discharge planning     15. Continue regular social work input     I have written the above note for Dr. Velez who performed service with me in the room.   Shelly Clemente NP-C ([phone])    I have seen and examined patient with NP, I agree with above note as scribed. 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    S: Patient seen and examined with HPC Transplant Team:   + intermittent fever and chills  + occasional cough  + chronic LB/sciatic pain  + fatigue     Denies mouth / tongue / throat pain, dyspnea, nausea, vomiting, diarrhea, abdominal pain     O: Vitals:   Vital Signs Last 24 Hrs  T(C): 38.4 (2019 06:45), Max: 39 (2019 05:35)  T(F): 101.1 (2019 06:45), Max: 102.2 (2019 05:35)  HR: 102 (2019 05:35) (81 - 102)  BP: 115/80 (2019 05:35) (115/76 - 143/85)  BP(mean): --  RR: 20 (2019 05:35) (18 - 20)  SpO2: 98% (2019 05:35) (97% - 100%)    Admit weight: 111.4 kg   Daily Weight in k.5 (15 Feb 2019 10:11)  Today's weight: 113.5 kg     Intake / Output:   02-15 @ 07:01  -  02-16 @ 07:00  --------------------------------------------------------  IN: 3750 mL / OUT: 4030 mL / NET: -280 mL    PE:   Oropharynx: no erythema or ulcers  Oral Mucositis: n/a                                                       stGstrstastdstest:st st1st CVS: S1S2, RRR  Lungs: fine bibasilar crackles  Abdomen: soft, NT/ND, normoactive BS x 4Q  Extremities: no edema  Gastric Mucositis: n/a                                                 stGstrstastdstest:st st1st Intestinal Mucositis: n/a                                             stGstrstastdstest:st st1st Skin: no rash  TLC: C/D/I  Neuro: nonfocal  Pain: none    Labs:   CBC Full  -  ( 2019 07:37 )  WBC Count : 0.4 K/uL  Hemoglobin : 10.8 g/dL  Hematocrit : 31.5 %  Platelet Count - Automated : 23 K/uL  Mean Cell Volume : 103.0 fl  Mean Cell Hemoglobin : 35.5 pg  Mean Cell Hemoglobin Concentration : 34.4 gm/dL  Auto Neutrophil # : x  Auto Lymphocyte # : x  Auto Monocyte # : x  Auto Eosinophil # : x  Auto Basophil # : x  Auto Neutrophil % : x  Auto Lymphocyte % : x  Auto Monocyte % : x  Auto Eosinophil % : x  Auto Basophil % : x                          10.8   0.4   )-----------( 23       ( 2019 07:37 )             31.5     02-15    139  |  101  |  9   ----------------------------<  101<H>  3.2<L>   |  27  |  0.75    Ca    8.7      15 Feb 2019 06:55  Phos  3.0     02-15  Mg     1.7     02-15    TPro  5.6<L>  /  Alb  3.2<L>  /  TBili  0.2  /  DBili  x   /  AST  32  /  ALT  45  /  AlkPhos  181<H>  02-15      LIVER FUNCTIONS - ( 15 Feb 2019 06:55 )  Alb: 3.2 g/dL / Pro: 5.6 g/dL / ALK PHOS: 181 U/L / ALT: 45 U/L / AST: 32 U/L / GGT: x           Cultures:   Culture - Urine (19 @ 14:10)    Specimen Source: .Urine Clean Catch (Midstream)    Culture Results:   <10,000 CFU/ml Normal Urogenital everton present    Culture - Blood (19 @ 12:00)    Specimen Source: .Blood Blood-Catheter    Culture Results:   No growth to date.    Radiology:   < from: Xray Chest 1 View- PORTABLE-Urgent (19 @ 09:12) >  IMPRESSION:  1.  The lungs are clear.    Meds:   Antimicrobials:   acyclovir   Oral Tab/Cap 400 milliGRAM(s) Oral every 8 hours    cefepime   IVPB 2000 milliGRAM(s) IV Intermittent every 8 hours  clotrimazole Lozenge 1 Lozenge Oral five times a day  fluconAZOLE   Tablet 400 milliGRAM(s) Oral daily    Heme / Onc:   heparin  Infusion 464 Unit(s)/Hr IV Continuous <Continuous>    GI:  docusate sodium 100 milliGRAM(s) Oral three times a day PRN  pantoprazole    Tablet 40 milliGRAM(s) Oral before breakfast  polyethylene glycol 3350 17 Gram(s) Oral daily  senna 1 Tablet(s) Oral at bedtime PRN  sodium bicarbonate Mouth Rinse 10 milliLiter(s) Swish and Spit five times a day  ursodiol Capsule 300 milliGRAM(s) Oral two times a day with meals    Cardiovascular:   furosemide   Injectable 20 milliGRAM(s) IV Push every 24 hours  losartan 100 milliGRAM(s) Oral daily    Immunologic:   immune   globulin 10% (GAMMAGARD) IVPB 20 Gram(s) IV Intermittent <User Schedule>    Other medications:   acetaminophen   Tablet .. 650 milliGRAM(s) Oral every 6 hours  acetaminophen   Tablet .. 650 milliGRAM(s) Oral <User Schedule>  aprepitant 80 milliGRAM(s) Oral every 24 hours  benzonatate 100 milliGRAM(s) Oral every 8 hours  Biotene Dry Mouth Oral Rinse 5 milliLiter(s) Swish and Spit five times a day  diphenhydrAMINE   Injectable 25 milliGRAM(s) IV Push every 3 weeks  folic acid 1 milliGRAM(s) Oral daily  levETIRAcetam 500 milliGRAM(s) Oral two times a day  metoclopramide Injectable 10 milliGRAM(s) IV Push every 6 hours  multivitamin 1 Tablet(s) Oral daily  nystatin Powder 1 Application(s) Topical every 8 hours  ondansetron Injectable 8 milliGRAM(s) IV Push every 8 hours  sodium chloride 0.45% 1000 milliLiter(s) IV Continuous <Continuous>  sodium chloride 0.9%. 1000 milliLiter(s) IV Continuous <Continuous>      PRN:   acetaminophen   Tablet .. 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  Pre Allogeneic PBSCT day +3  HPC transplant on day 0 - continue transplant hydration for 24 hours post infusion of cells   H/o subdural hematomas on [facility]  Baseline CT Head non-con, no acute intracranial abnormality, possible sinusitis   Cough x 3 weeks, CXR clear, continue Tessalon 100 mg PRN, RVP (-)  + UTI on admission - started on cipro, febrile this AM, pancx, CXR pending and Cipro switched to Cefepime  Chronic back pain- Dilaudid 0.5 mg IVP q 4 hours PRN  Mild transaminitis, monitor  Neutropenic fevers, would give Solumedrol 1 mg/kg IVP once if temp rises to 104 F, will receive post-transplant Cytoxan today and tomorrow     1. Infectious Disease:   acyclovir   Oral Tab/Cap 400 milliGRAM(s) Oral every 8 hours    cefepime   IVPB 2000 milliGRAM(s) IV Intermittent every 8 hours  clotrimazole Lozenge 1 Lozenge Oral five times a day  fluconAZOLE   Tablet 400 milliGRAM(s) Oral daily    2. VOD Prophylaxis: Actigall, Glutamine, Heparin (dosed at 100 units / kg / day)     3. GI Prophylaxis:    pantoprazole    Tablet 40 milliGRAM(s) Oral before breakfast    4. Mouthcare - NS / NaHCO3 rinses, Mycelex, Caphosol; Skin care     5. GVHD prophylaxis   CTX days + 3, + 4   , MMF to start on day + 5     6. Transfuse & replete electrolytes prn     7. IV hydration, daily weights, strict I&O, prn diuresis   Continue CTX hydration   Lasix 40 mg IV x 2    8. PO intake as tolerated, nutrition follow up as needed, MVI, folic acid     9. Antiemetics, anti-diarrhea medications:   ondansetron Injectable 8 milliGRAM(s) IV Push every 8 hours  LORazepam   Injectable 1 milliGRAM(s) IV Push every 6 hours PRN  metoclopramide Injectable 10 milliGRAM(s) IV Push every 6 hours  aprepitant 80 milliGRAM(s) Oral every 24 hours    10. OOB as tolerated, physical therapy consult if needed     11. Monitor coags / fibrinogen 2x week, vitamin K as needed     12. Monitor closely for clinical changes, monitor for fevers     13. Emotional support provided, plan of care discussed and questions addressed     14. Patient education done regarding plan of care, restrictions and discharge planning     15. Continue regular social work input     I have written the above note for Dr. Velez who performed service with me in the room.   Shelly Clemente NP-C ([phone])    I have seen and examined patient with NP, I agree with above note as scribed.

## 2019-02-16 NOTE — PROGRESS NOTE ADULT - ATTENDING COMMENTS
58 year old female with PMH [place] chromosome positive ALL diagnosed in 2016 s/p R HyperCVAD X 4 cycles, IT MTX X 2, s/p autologous stem cell transplantation (12/16/16),  who presented in October 2018 with subdural hemorrhage and relapsed disease confirmed by peripheral blood flow cytometry treated with Inotuzumab X 2 cycles.  Bone marrow biopsy on 1/23/19 showed remission with FISH and PCR for BCR-ABL translocation negative on the BM specimen but peripheral blood testing on 1/31 showed .004% BCR-ABL transcript with negative FISH suggesting MRD positivity.  Patient was on Ponatinib, DC 1/31/19. LFT's improved off drug. CR2    Patient was admitted for haploidentical stem cell transplantation with fludarabine/cytoxan/TBI conditioning. She is s/p HPC transplant, day +2  For high-dose Cytoxan on days +3, +4(GVHD prophylaxis); then begin Tacrolimis, Cellcept on day +5.  Begin Zarxio on day +5    Patient complaint of flu-like symptoms on admission, CXR was negative for pneumonia. Viral studies negative.     Neutropenic fever- started on Cefepime, cultures sent, CXR done; on Acyclovir, Fluconazole prophylaxis.    History of SDH, continue Keppra. CT scan baseline 2/7/19 negative for SDH.     History of HTN continue Losartan.     VOD prophylaxis as per protocol.  Transaminitis- mild, monitor LFT's. 58 year old female with PMH [place] chromosome positive ALL diagnosed in 2016 s/p R HyperCVAD X 4 cycles, IT MTX X 2, s/p autologous stem cell transplantation (12/16/16),  who presented in October 2018 with subdural hemorrhage and relapsed disease confirmed by peripheral blood flow cytometry treated with Inotuzumab X 2 cycles.  Bone marrow biopsy on 1/23/19 showed remission with FISH and PCR for BCR-ABL translocation negative on the BM specimen but peripheral blood testing on 1/31 showed .004% BCR-ABL transcript with negative FISH suggesting MRD positivity.  Patient was on Ponatinib, DC 1/31/19. LFT's improved off drug. CR2    Patient was admitted for haploidentical stem cell transplantation with fludarabine/cytoxan/TBI conditioning. She is s/p HPC transplant, day +3  For high-dose Cytoxan on days +3, +4(GVHD prophylaxis); then begin Tacrolimis, Cellcept on day +5.  Begin Zarxio on day +5    Patient complaint of flu-like symptoms on admission, CXR was negative for pneumonia. Viral studies negative. Fvers with rigirs persist. No other symptoms. Appears nontoxic.    Neutropenic fever- started on Cefepime, cultures sent, CXR done; on Acyclovir, Fluconazole prophylaxis.    History of SDH, continue Keppra. CT scan baseline 2/7/19 negative for SDH.     History of HTN continue Losartan.     VOD prophylaxis as per protocol.  Transaminitis- mild, monitor LFT's.

## 2019-02-17 LAB
ALBUMIN SERPL ELPH-MCNC: 3.1 G/DL — LOW (ref 3.3–5)
ALP SERPL-CCNC: 185 U/L — HIGH (ref 40–120)
ALT FLD-CCNC: 46 U/L — HIGH (ref 10–45)
ANION GAP SERPL CALC-SCNC: 12 MMOL/L — SIGNIFICANT CHANGE UP (ref 5–17)
AST SERPL-CCNC: 24 U/L — SIGNIFICANT CHANGE UP (ref 10–40)
BILIRUB SERPL-MCNC: 0.2 MG/DL — SIGNIFICANT CHANGE UP (ref 0.2–1.2)
BUN SERPL-MCNC: 10 MG/DL — SIGNIFICANT CHANGE UP (ref 7–23)
CALCIUM SERPL-MCNC: 8.4 MG/DL — SIGNIFICANT CHANGE UP (ref 8.4–10.5)
CHLORIDE SERPL-SCNC: 102 MMOL/L — SIGNIFICANT CHANGE UP (ref 96–108)
CO2 SERPL-SCNC: 24 MMOL/L — SIGNIFICANT CHANGE UP (ref 22–31)
CREAT SERPL-MCNC: 0.7 MG/DL — SIGNIFICANT CHANGE UP (ref 0.5–1.3)
CULTURE RESULTS: SIGNIFICANT CHANGE UP
GLUCOSE SERPL-MCNC: 144 MG/DL — HIGH (ref 70–99)
HCT VFR BLD CALC: 31 % — LOW (ref 34.5–45)
HGB BLD-MCNC: 10.4 G/DL — LOW (ref 11.5–15.5)
LDH SERPL L TO P-CCNC: 166 U/L — SIGNIFICANT CHANGE UP (ref 50–242)
MAGNESIUM SERPL-MCNC: 1.7 MG/DL — SIGNIFICANT CHANGE UP (ref 1.6–2.6)
MCHC RBC-ENTMCNC: 33.4 GM/DL — SIGNIFICANT CHANGE UP (ref 32–36)
MCHC RBC-ENTMCNC: 34.2 PG — HIGH (ref 27–34)
MCV RBC AUTO: 102 FL — HIGH (ref 80–100)
PHOSPHATE SERPL-MCNC: 2.8 MG/DL — SIGNIFICANT CHANGE UP (ref 2.5–4.5)
PLATELET # BLD AUTO: 14 K/UL — CRITICAL LOW (ref 150–400)
POTASSIUM SERPL-MCNC: 3.6 MMOL/L — SIGNIFICANT CHANGE UP (ref 3.5–5.3)
POTASSIUM SERPL-SCNC: 3.6 MMOL/L — SIGNIFICANT CHANGE UP (ref 3.5–5.3)
PROT SERPL-MCNC: 5.8 G/DL — LOW (ref 6–8.3)
RBC # BLD: 3.03 M/UL — LOW (ref 3.8–5.2)
RBC # FLD: 12.4 % — SIGNIFICANT CHANGE UP (ref 10.3–14.5)
SODIUM SERPL-SCNC: 138 MMOL/L — SIGNIFICANT CHANGE UP (ref 135–145)
SP GR UR STRIP: 1.01 — LOW (ref 1.01–1.02)
SPECIMEN SOURCE: SIGNIFICANT CHANGE UP
WBC # BLD: 0.2 K/UL — CRITICAL LOW (ref 3.8–10.5)
WBC # FLD AUTO: 0.2 K/UL — CRITICAL LOW (ref 3.8–10.5)

## 2019-02-17 PROCEDURE — 99291 CRITICAL CARE FIRST HOUR: CPT

## 2019-02-17 PROCEDURE — 93010 ELECTROCARDIOGRAM REPORT: CPT

## 2019-02-17 RX ORDER — FUROSEMIDE 40 MG
40 TABLET ORAL ONCE
Qty: 0 | Refills: 0 | Status: COMPLETED | OUTPATIENT
Start: 2019-02-17 | End: 2019-02-17

## 2019-02-17 RX ADMIN — CEFEPIME 100 MILLIGRAM(S): 1 INJECTION, POWDER, FOR SOLUTION INTRAMUSCULAR; INTRAVENOUS at 13:12

## 2019-02-17 RX ADMIN — URSODIOL 300 MILLIGRAM(S): 250 TABLET, FILM COATED ORAL at 08:50

## 2019-02-17 RX ADMIN — Medication 100 MILLIGRAM(S): at 13:12

## 2019-02-17 RX ADMIN — PANTOPRAZOLE SODIUM 40 MILLIGRAM(S): 20 TABLET, DELAYED RELEASE ORAL at 05:09

## 2019-02-17 RX ADMIN — Medication 10 MILLIGRAM(S): at 23:15

## 2019-02-17 RX ADMIN — MESNA 453.48 MILLIGRAM(S): 100 INJECTION, SOLUTION INTRAVENOUS at 18:00

## 2019-02-17 RX ADMIN — Medication 25 MILLIGRAM(S): at 10:06

## 2019-02-17 RX ADMIN — Medication 10 MILLIGRAM(S): at 17:09

## 2019-02-17 RX ADMIN — Medication 10 MILLILITER(S): at 12:05

## 2019-02-17 RX ADMIN — Medication 10 MILLILITER(S): at 23:15

## 2019-02-17 RX ADMIN — HYDROMORPHONE HYDROCHLORIDE 0.5 MILLIGRAM(S): 2 INJECTION INTRAMUSCULAR; INTRAVENOUS; SUBCUTANEOUS at 04:15

## 2019-02-17 RX ADMIN — Medication 10 MILLILITER(S): at 19:55

## 2019-02-17 RX ADMIN — NYSTATIN CREAM 1 APPLICATION(S): 100000 CREAM TOPICAL at 13:13

## 2019-02-17 RX ADMIN — Medication 40 MILLIGRAM(S): at 17:01

## 2019-02-17 RX ADMIN — Medication 20 MILLIGRAM(S): at 19:55

## 2019-02-17 RX ADMIN — LEVETIRACETAM 500 MILLIGRAM(S): 250 TABLET, FILM COATED ORAL at 05:07

## 2019-02-17 RX ADMIN — HYDROMORPHONE HYDROCHLORIDE 0.5 MILLIGRAM(S): 2 INJECTION INTRAMUSCULAR; INTRAVENOUS; SUBCUTANEOUS at 16:30

## 2019-02-17 RX ADMIN — NYSTATIN CREAM 1 APPLICATION(S): 100000 CREAM TOPICAL at 21:31

## 2019-02-17 RX ADMIN — CEFEPIME 100 MILLIGRAM(S): 1 INJECTION, POWDER, FOR SOLUTION INTRAMUSCULAR; INTRAVENOUS at 05:07

## 2019-02-17 RX ADMIN — Medication 5 MILLILITER(S): at 17:01

## 2019-02-17 RX ADMIN — HYDROMORPHONE HYDROCHLORIDE 0.5 MILLIGRAM(S): 2 INJECTION INTRAMUSCULAR; INTRAVENOUS; SUBCUTANEOUS at 15:03

## 2019-02-17 RX ADMIN — MESNA 453.48 MILLIGRAM(S): 100 INJECTION, SOLUTION INTRAVENOUS at 21:00

## 2019-02-17 RX ADMIN — Medication 400 MILLIGRAM(S): at 05:07

## 2019-02-17 RX ADMIN — URSODIOL 300 MILLIGRAM(S): 250 TABLET, FILM COATED ORAL at 17:09

## 2019-02-17 RX ADMIN — HYDROMORPHONE HYDROCHLORIDE 0.5 MILLIGRAM(S): 2 INJECTION INTRAMUSCULAR; INTRAVENOUS; SUBCUTANEOUS at 08:30

## 2019-02-17 RX ADMIN — MESNA 453.48 MILLIGRAM(S): 100 INJECTION, SOLUTION INTRAVENOUS at 01:15

## 2019-02-17 RX ADMIN — Medication 10 MILLIGRAM(S): at 05:08

## 2019-02-17 RX ADMIN — Medication 400 MILLIGRAM(S): at 21:30

## 2019-02-17 RX ADMIN — ONDANSETRON 8 MILLIGRAM(S): 8 TABLET, FILM COATED ORAL at 17:09

## 2019-02-17 RX ADMIN — Medication 1 LOZENGE: at 12:02

## 2019-02-17 RX ADMIN — Medication 1 TABLET(S): at 12:03

## 2019-02-17 RX ADMIN — Medication 5 MILLILITER(S): at 19:55

## 2019-02-17 RX ADMIN — CEFEPIME 100 MILLIGRAM(S): 1 INJECTION, POWDER, FOR SOLUTION INTRAMUSCULAR; INTRAVENOUS at 21:30

## 2019-02-17 RX ADMIN — Medication 100 MILLIGRAM(S): at 05:08

## 2019-02-17 RX ADMIN — Medication 650 MILLIGRAM(S): at 10:37

## 2019-02-17 RX ADMIN — HYDROMORPHONE HYDROCHLORIDE 0.5 MILLIGRAM(S): 2 INJECTION INTRAMUSCULAR; INTRAVENOUS; SUBCUTANEOUS at 09:00

## 2019-02-17 RX ADMIN — FLUCONAZOLE 400 MILLIGRAM(S): 150 TABLET ORAL at 12:03

## 2019-02-17 RX ADMIN — Medication 100 MILLIGRAM(S): at 21:30

## 2019-02-17 RX ADMIN — APREPITANT 80 MILLIGRAM(S): 80 CAPSULE ORAL at 17:07

## 2019-02-17 RX ADMIN — Medication 1 LOZENGE: at 17:01

## 2019-02-17 RX ADMIN — Medication 1 MILLIGRAM(S): at 12:02

## 2019-02-17 RX ADMIN — Medication 1 LOZENGE: at 08:49

## 2019-02-17 RX ADMIN — Medication 1 LOZENGE: at 19:56

## 2019-02-17 RX ADMIN — MESNA 453.48 MILLIGRAM(S): 100 INJECTION, SOLUTION INTRAVENOUS at 04:12

## 2019-02-17 RX ADMIN — Medication 335.38 MILLIGRAM(S): at 15:00

## 2019-02-17 RX ADMIN — MESNA 453.48 MILLIGRAM(S): 100 INJECTION, SOLUTION INTRAVENOUS at 14:59

## 2019-02-17 RX ADMIN — Medication 10 MILLILITER(S): at 16:59

## 2019-02-17 RX ADMIN — HYDROMORPHONE HYDROCHLORIDE 0.5 MILLIGRAM(S): 2 INJECTION INTRAMUSCULAR; INTRAVENOUS; SUBCUTANEOUS at 04:00

## 2019-02-17 RX ADMIN — HYDROMORPHONE HYDROCHLORIDE 0.5 MILLIGRAM(S): 2 INJECTION INTRAMUSCULAR; INTRAVENOUS; SUBCUTANEOUS at 19:30

## 2019-02-17 RX ADMIN — Medication 5 MILLILITER(S): at 23:14

## 2019-02-17 RX ADMIN — Medication 400 MILLIGRAM(S): at 13:12

## 2019-02-17 RX ADMIN — NYSTATIN CREAM 1 APPLICATION(S): 100000 CREAM TOPICAL at 05:08

## 2019-02-17 RX ADMIN — LEVETIRACETAM 500 MILLIGRAM(S): 250 TABLET, FILM COATED ORAL at 17:10

## 2019-02-17 RX ADMIN — LOSARTAN POTASSIUM 100 MILLIGRAM(S): 100 TABLET, FILM COATED ORAL at 05:08

## 2019-02-17 RX ADMIN — Medication 650 MILLIGRAM(S): at 10:05

## 2019-02-17 RX ADMIN — Medication 10 MILLIGRAM(S): at 12:04

## 2019-02-17 RX ADMIN — Medication 1 LOZENGE: at 23:15

## 2019-02-17 RX ADMIN — Medication 40 MILLIGRAM(S): at 12:00

## 2019-02-17 RX ADMIN — ONDANSETRON 8 MILLIGRAM(S): 8 TABLET, FILM COATED ORAL at 10:04

## 2019-02-17 RX ADMIN — Medication 5 MILLILITER(S): at 12:03

## 2019-02-17 RX ADMIN — Medication 10 MILLILITER(S): at 08:51

## 2019-02-17 RX ADMIN — HYDROMORPHONE HYDROCHLORIDE 0.5 MILLIGRAM(S): 2 INJECTION INTRAMUSCULAR; INTRAVENOUS; SUBCUTANEOUS at 19:50

## 2019-02-17 RX ADMIN — Medication 5 MILLILITER(S): at 08:50

## 2019-02-17 NOTE — PROGRESS NOTE ADULT - ATTENDING COMMENTS
58 year old female with PMH [place] chromosome positive ALL diagnosed in 2016 s/p R HyperCVAD X 4 cycles, IT MTX X 2, s/p autologous stem cell transplantation (12/16/16),  who presented in October 2018 with subdural hemorrhage and relapsed disease confirmed by peripheral blood flow cytometry treated with Inotuzumab X 2 cycles.  Bone marrow biopsy on 1/23/19 showed remission with FISH and PCR for BCR-ABL translocation negative on the BM specimen but peripheral blood testing on 1/31 showed .004% BCR-ABL transcript with negative FISH suggesting MRD positivity.  Patient was on Ponatinib, DC 1/31/19. LFT's improved off drug. CR2    Patient was admitted for haploidentical stem cell transplantation with fludarabine/cytoxan/TBI conditioning. She is s/p HPC transplant, day +3  For high-dose Cytoxan on days +3, +4(GVHD prophylaxis); then begin Tacrolimis, Cellcept on day +5.  Begin Zarxio on day +5    Patient complaint of flu-like symptoms on admission, CXR was negative for pneumonia. Viral studies negative. Fvers with rigirs persist. No other symptoms. Appears nontoxic.    Neutropenic fever- started on Cefepime, cultures sent, CXR done; on Acyclovir, Fluconazole prophylaxis.    History of SDH, continue Keppra. CT scan baseline 2/7/19 negative for SDH.     History of HTN continue Losartan.     VOD prophylaxis as per protocol.  Transaminitis- mild, monitor LFT's. 58 year old female with PMH [place] chromosome positive ALL diagnosed in 2016 s/p R HyperCVAD X 4 cycles, IT MTX X 2, s/p autologous stem cell transplantation (12/16/16),  who presented in October 2018 with subdural hemorrhage and relapsed disease confirmed by peripheral blood flow cytometry treated with Inotuzumab X 2 cycles.  Bone marrow biopsy on 1/23/19 showed remission with FISH and PCR for BCR-ABL translocation negative on the BM specimen but peripheral blood testing on 1/31 showed .004% BCR-ABL transcript with negative FISH suggesting MRD positivity.  Patient was on Ponatinib, DC 1/31/19. LFT's improved off drug. CR2    Patient was admitted for haploidentical stem cell transplantation with fludarabine/cytoxan/TBI conditioning. She is s/p HPC transplant, day +4  For high-dose Cytoxan on days +3, +4(GVHD prophylaxis); then begin Tacrolimis, Cellcept on day +5.  Begin Zarxio on day +5    Overnight patient with high temps, Tmax 104.4, given solumedrol for haplostorm.  She is afebrile now, due for day2 of cytoxan today.    Transfuse plt today.    Neutropenic fever- on Cefepime, culture from 2/14 negative, repeat pending.  CXR negative.  on Acyclovir, Fluconazole prophylaxis.  Monitor I/O's/weights- lasix today.    History of SDH, continue Keppra. CT scan baseline 2/7/19 negative for SDH.     History of HTN continue Losartan.     VOD prophylaxis as per protocol.

## 2019-02-17 NOTE — PROGRESS NOTE ADULT - SUBJECTIVE AND OBJECTIVE BOX
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    S: Patient seen and examined with HPC Transplant Team:   + intermittent fever and chills  + occasional cough  + chronic LB/sciatic pain  + fatigue     Denies mouth / tongue / throat pain, dyspnea, cough, nausea, vomiting, diarrhea, abdominal pain     O: Vitals:   Vital Signs Last 24 Hrs  T(C): 37.2 (2019 06:18), Max: 40.2 (2019 21:05)  T(F): 99 (2019 06:18), Max: 104.4 (2019 21:05)  HR: 75 (2019 06:18) (75 - 112)  BP: 137/78 (2019 06:18) (106/56 - 137/78)  BP(mean): --  RR: 18 (2019 06:18) (18 - 20)  SpO2: 100% (2019 06:18) (95% - 100%)    Admit weight: 111.4 kg   Daily Weight in k.5 (2019 10:00)  Today's weight:     Intake / Output:   02-16 @ 07:01  -  02-17 @ 07:00  --------------------------------------------------------  IN: 7403 mL / OUT: 9775 mL / NET: -2372 mL        PE:   Oropharynx: no erythema or ulcers  Oral Mucositis: n/a                                                       stGstrstastdstest:st st1st CVS: S1S2, RRR  Lungs: fine bibasilar crackles  Abdomen: soft, NT/ND, normoactive BS x 4Q  Extremities: no edema  Gastric Mucositis: n/a                                                 stGstrstastdstest:st st1st Intestinal Mucositis: n/a                                             stGstrstastdstest:st st1st Skin: no rash  TLC: C/D/I  Neuro: nonfocal  Pain: none    Labs:   CBC Full  -  ( 2019 06:51 )  WBC Count : 0.2 K/uL  Hemoglobin : 10.4 g/dL  Hematocrit : 31.0 %  Platelet Count - Automated : 14 K/uL  Mean Cell Volume : 102.0 fl  Mean Cell Hemoglobin : 34.2 pg  Mean Cell Hemoglobin Concentration : 33.4 gm/dL  Auto Neutrophil # : x  Auto Lymphocyte # : x  Auto Monocyte # : x  Auto Eosinophil # : x  Auto Basophil # : x  Auto Neutrophil % : x  Auto Lymphocyte % : x  Auto Monocyte % : x  Auto Eosinophil % : x  Auto Basophil % : x                          10.4   0.2   )-----------( 14       ( 2019 06:51 )             31.0         138  |  102  |  10  ----------------------------<  144<H>  3.6   |  24  |  0.70    Ca    8.4      2019 06:51  Phos  2.8       Mg     1.7         TPro  5.8<L>  /  Alb  3.1<L>  /  TBili  0.2  /  DBili  x   /  AST  24  /  ALT  46<H>  /  AlkPhos  185<H>        LIVER FUNCTIONS - ( 2019 06:51 )  Alb: 3.1 g/dL / Pro: 5.8 g/dL / ALK PHOS: 185 U/L / ALT: 46 U/L / AST: 24 U/L / GGT: x           Lactate Dehydrogenase, Serum: 166 U/L ( @ 06:51)    Cultures:   Culture - Urine (19 @ 14:10)    Specimen Source: .Urine Clean Catch (Midstream)    Culture Results:   <10,000 CFU/ml Normal Urogenital everton present    Culture - Sterility Check (19 @ 12:54)    Culture Results:   No growth    Specimen Source: STER [id number]    Radiology:   < from: Xray Chest 1 View- PORTABLE-Urgent (19 @ 09:12) >  IMPRESSION:  1.  The lungs are clear.    Meds:   Antimicrobials:   acyclovir   Oral Tab/Cap 400 milliGRAM(s) Oral every 8 hours   cefepime   IVPB 2000 milliGRAM(s) IV Intermittent every 8 hours  clotrimazole Lozenge 1 Lozenge Oral five times a day  fluconAZOLE   Tablet 400 milliGRAM(s) Oral daily    Heme / Onc:   cyclophosphamide IVPB (eMAR) 3415 milliGRAM(s) IV Intermittent every 24 hours  heparin  Infusion 464 Unit(s)/Hr IV Continuous <Continuous>  mesna IVPB (eMAR) 1337 milliGRAM(s) IV Intermittent every 3 hours    GI:  docusate sodium 100 milliGRAM(s) Oral three times a day PRN  pantoprazole    Tablet 40 milliGRAM(s) Oral before breakfast  polyethylene glycol 3350 17 Gram(s) Oral daily  senna 1 Tablet(s) Oral at bedtime PRN  sodium bicarbonate Mouth Rinse 10 milliLiter(s) Swish and Spit five times a day  ursodiol Capsule 300 milliGRAM(s) Oral two times a day with meals    Cardiovascular:   furosemide   Injectable 20 milliGRAM(s) IV Push every 24 hours  losartan 100 milliGRAM(s) Oral daily    Immunologic:   immune   globulin 10% (GAMMAGARD) IVPB 20 Gram(s) IV Intermittent <User Schedule>    Other medications:   acetaminophen   Tablet .. 650 milliGRAM(s) Oral every 6 hours  acetaminophen   Tablet .. 650 milliGRAM(s) Oral <User Schedule>  aprepitant 80 milliGRAM(s) Oral every 24 hours  benzonatate 100 milliGRAM(s) Oral every 8 hours  Biotene Dry Mouth Oral Rinse 5 milliLiter(s) Swish and Spit five times a day  diphenhydrAMINE   Injectable 25 milliGRAM(s) IV Push every 3 weeks  folic acid 1 milliGRAM(s) Oral daily  levETIRAcetam 500 milliGRAM(s) Oral two times a day  metoclopramide Injectable 10 milliGRAM(s) IV Push every 6 hours  multivitamin 1 Tablet(s) Oral daily  nystatin Powder 1 Application(s) Topical every 8 hours  ondansetron Injectable 8 milliGRAM(s) IV Push every 8 hours  sodium chloride 0.45% 1000 milliLiter(s) IV Continuous <Continuous>  sodium chloride 0.9%. 1000 milliLiter(s) IV Continuous <Continuous    PRN:   acetaminophen   Tablet .. 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  Post Allogeneic PBSCT day +4  HPC transplant on day 0 - continue transplant hydration for 24 hours post infusion of cells   H/o subdural hematomas on [facility]  Baseline CT Head non-con, no acute intracranial abnormality, possible sinusitis   Cough x 3 weeks, CXR clear, continue Tessalon 100 mg PRN, RVP (-)  + UTI on admission - started on cipro, febrile this AM, pancx, CXR pending and Cipro switched to Cefepime  Chronic back pain- Dilaudid 0.5 mg IVP q 4 hours PRN  Mild transaminitis, monitor  Neutropenic fevers, receivied Solumedrol 1 mg/kg IVP  for fever > 104. Received Cytoxan yesterday and plan again today        1. Infectious Disease:   acyclovir   Oral Tab/Cap 400 milliGRAM(s) Oral every 8 hours   cefepime   IVPB 2000 milliGRAM(s) IV Intermittent every 8 hours  clotrimazole Lozenge 1 Lozenge Oral five times a day  fluconAZOLE   Tablet 400 milliGRAM(s) Oral daily    2. VOD Prophylaxis: Actigall, Glutamine, Heparin (dosed at 100 units / kg / day)     3. GI Prophylaxis:    pantoprazole    Tablet 40 milliGRAM(s) Oral before breakfast    4. Mouthcare - NS / NaHCO3 rinses, Mycelex, Caphosol; Skin care     5. GVHD prophylaxis   CTX days + 3, + 4   , MMF to start on day + 5     6. Transfuse & replete electrolytes prn     7. IV hydration, daily weights, strict I&O, prn diuresis   Continue CTX hydration   Lasix 40 mg IV     8. PO intake as tolerated, nutrition follow up as needed, MVI, folic acid     9. Antiemetics, anti-diarrhea medications:   ondansetron Injectable 8 milliGRAM(s) IV Push every 8 hours  LORazepam   Injectable 1 milliGRAM(s) IV Push every 6 hours PRN  metoclopramide Injectable 10 milliGRAM(s) IV Push every 6 hours  aprepitant 80 milliGRAM(s) Oral every 24 hours    10. OOB as tolerated, physical therapy consult if needed     11. Monitor coags / fibrinogen 2x week, vitamin K as needed     12. Monitor closely for clinical changes, monitor for fevers     13. Emotional support provided, plan of care discussed and questions addressed     14. Patient education done regarding plan of care, restrictions and discharge planning     15. Continue regular social work input     I have written the above note for Dr. Hernandes who performed service with me in the room.   Shelly Clemente NP-C ([phone])    I have seen and examined patient with NP, I agree with above note as scribed. 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    S: Patient seen and examined with HPC Transplant Team:   + fever and chills  + occasional cough  + chronic LB/sciatic pain  + fatigue     Denies mouth / tongue / throat pain, dyspnea, nausea, vomiting, diarrhea, abdominal pain     O: Vitals:   Vital Signs Last 24 Hrs  T(C): 37.2 (2019 06:18), Max: 40.2 (2019 21:05)  T(F): 99 (2019 06:18), Max: 104.4 (2019 21:05)  HR: 75 (2019 06:18) (75 - 112)  BP: 137/78 (2019 06:18) (106/56 - 137/78)  BP(mean): --  RR: 18 (2019 06:18) (18 - 20)  SpO2: 100% (2019 06:18) (95% - 100%)    Admit weight: 111.4 kg   Daily Weight in k.5 (2019 10:00)  Today's weight:     Intake / Output:   02-16 @ 07:01  -  02-17 @ 07:00  --------------------------------------------------------  IN: 7403 mL / OUT: 9775 mL / NET: -2372 mL    PE:   Oropharynx: no erythema or ulcers  Oral Mucositis: n/a                                                       stGstrstastdstest:st st1st CVS: S1S2, RRR  Lungs: fine bibasilar crackles  Abdomen: soft, NT/ND, normoactive BS x 4Q  Extremities: no edema  Gastric Mucositis: n/a                                                 stGstrstastdstest:st st1st Intestinal Mucositis: n/a                                             stGstrstastdstest:st st1st Skin: no rash  TLC: C/D/I  Neuro: nonfocal  Pain: none    Labs:   CBC Full  -  ( 2019 06:51 )  WBC Count : 0.2 K/uL  Hemoglobin : 10.4 g/dL  Hematocrit : 31.0 %  Platelet Count - Automated : 14 K/uL  Mean Cell Volume : 102.0 fl  Mean Cell Hemoglobin : 34.2 pg  Mean Cell Hemoglobin Concentration : 33.4 gm/dL  Auto Neutrophil # : x  Auto Lymphocyte # : x  Auto Monocyte # : x  Auto Eosinophil # : x  Auto Basophil # : x  Auto Neutrophil % : x  Auto Lymphocyte % : x  Auto Monocyte % : x  Auto Eosinophil % : x  Auto Basophil % : x                          10.4   0.2   )-----------( 14       ( 2019 06:51 )             31.0         138  |  102  |  10  ----------------------------<  144<H>  3.6   |  24  |  0.70    Ca    8.4      2019 06:51  Phos  2.8       Mg     1.7         TPro  5.8<L>  /  Alb  3.1<L>  /  TBili  0.2  /  DBili  x   /  AST  24  /  ALT  46<H>  /  AlkPhos  185<H>        LIVER FUNCTIONS - ( 2019 06:51 )  Alb: 3.1 g/dL / Pro: 5.8 g/dL / ALK PHOS: 185 U/L / ALT: 46 U/L / AST: 24 U/L / GGT: x           Lactate Dehydrogenase, Serum: 166 U/L ( @ 06:51)    Cultures:   Culture - Urine (19 @ 14:10)    Specimen Source: .Urine Clean Catch (Midstream)    Culture Results:   <10,000 CFU/ml Normal Urogenital everton present    Culture - Sterility Check (19 @ 12:54)    Culture Results:   No growth    Specimen Source: STER [id number]    Radiology:   < from: Xray Chest 1 View- PORTABLE-Urgent (19 @ 09:12) >  IMPRESSION:  1.  The lungs are clear.    Meds:   Antimicrobials:   acyclovir   Oral Tab/Cap 400 milliGRAM(s) Oral every 8 hours   cefepime   IVPB 2000 milliGRAM(s) IV Intermittent every 8 hours  clotrimazole Lozenge 1 Lozenge Oral five times a day  fluconAZOLE   Tablet 400 milliGRAM(s) Oral daily    Heme / Onc:   cyclophosphamide IVPB (eMAR) 3415 milliGRAM(s) IV Intermittent every 24 hours  heparin  Infusion 464 Unit(s)/Hr IV Continuous <Continuous>  mesna IVPB (eMAR) 1337 milliGRAM(s) IV Intermittent every 3 hours    GI:  docusate sodium 100 milliGRAM(s) Oral three times a day PRN  pantoprazole    Tablet 40 milliGRAM(s) Oral before breakfast  polyethylene glycol 3350 17 Gram(s) Oral daily  senna 1 Tablet(s) Oral at bedtime PRN  sodium bicarbonate Mouth Rinse 10 milliLiter(s) Swish and Spit five times a day  ursodiol Capsule 300 milliGRAM(s) Oral two times a day with meals    Cardiovascular:   furosemide   Injectable 20 milliGRAM(s) IV Push every 24 hours  losartan 100 milliGRAM(s) Oral daily    Immunologic:   immune   globulin 10% (GAMMAGARD) IVPB 20 Gram(s) IV Intermittent <User Schedule>    Other medications:   acetaminophen   Tablet .. 650 milliGRAM(s) Oral every 6 hours  acetaminophen   Tablet .. 650 milliGRAM(s) Oral <User Schedule>  aprepitant 80 milliGRAM(s) Oral every 24 hours  benzonatate 100 milliGRAM(s) Oral every 8 hours  Biotene Dry Mouth Oral Rinse 5 milliLiter(s) Swish and Spit five times a day  diphenhydrAMINE   Injectable 25 milliGRAM(s) IV Push every 3 weeks  folic acid 1 milliGRAM(s) Oral daily  levETIRAcetam 500 milliGRAM(s) Oral two times a day  metoclopramide Injectable 10 milliGRAM(s) IV Push every 6 hours  multivitamin 1 Tablet(s) Oral daily  nystatin Powder 1 Application(s) Topical every 8 hours  ondansetron Injectable 8 milliGRAM(s) IV Push every 8 hours  sodium chloride 0.45% 1000 milliLiter(s) IV Continuous <Continuous>  sodium chloride 0.9%. 1000 milliLiter(s) IV Continuous <Continuous    PRN:   acetaminophen   Tablet .. 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  Post Allogeneic PBSCT day +4  HPC transplant on day 0 - continue transplant hydration for 24 hours post infusion of cells   H/o subdural hematomas on [facility]  Baseline CT Head non-con, no acute intracranial abnormality, possible sinusitis   Cough x 3 weeks, CXR clear, continue Tessalon 100 mg PRN, RVP (-)  + UTI on admission - started on cipro, febrile this AM, pancx, CXR pending and Cipro switched to Cefepime  Chronic back pain- Dilaudid 0.5 mg IVP q 4 hours PRN  Mild transaminitis, monitor  Neutropenic fevers, receivied Solumedrol 1 mg/kg IVP  for fever > 104. Received Cytoxan yesterday and plan again today        1. Infectious Disease:   acyclovir   Oral Tab/Cap 400 milliGRAM(s) Oral every 8 hours   cefepime   IVPB 2000 milliGRAM(s) IV Intermittent every 8 hours  clotrimazole Lozenge 1 Lozenge Oral five times a day  fluconAZOLE   Tablet 400 milliGRAM(s) Oral daily    2. VOD Prophylaxis: Actigall, Glutamine, Heparin (dosed at 100 units / kg / day)     3. GI Prophylaxis:    pantoprazole    Tablet 40 milliGRAM(s) Oral before breakfast    4. Mouthcare - NS / NaHCO3 rinses, Mycelex, Caphosol; Skin care     5. GVHD prophylaxis   CTX days + 3, + 4   , MMF to start on day + 5     6. Transfuse & replete electrolytes prn     7. IV hydration, daily weights, strict I&O, prn diuresis   Continue CTX hydration   Lasix 40 mg IV     8. PO intake as tolerated, nutrition follow up as needed, MVI, folic acid     9. Antiemetics, anti-diarrhea medications:   ondansetron Injectable 8 milliGRAM(s) IV Push every 8 hours  LORazepam   Injectable 1 milliGRAM(s) IV Push every 6 hours PRN  metoclopramide Injectable 10 milliGRAM(s) IV Push every 6 hours  aprepitant 80 milliGRAM(s) Oral every 24 hours    10. OOB as tolerated, physical therapy consult if needed     11. Monitor coags / fibrinogen 2x week, vitamin K as needed     12. Monitor closely for clinical changes, monitor for fevers     13. Emotional support provided, plan of care discussed and questions addressed     14. Patient education done regarding plan of care, restrictions and discharge planning     15. Continue regular social work input     I have written the above note for Dr. Hernandes who performed service with me in the room.   Shlely Clemente NP-C ([phone])    I have seen and examined patient with NP, I agree with above note as scribed. 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    S: Patient seen and examined with HPC Transplant Team:   + fever and chills  + occasional cough  + chronic LB/sciatic pain  + fatigue     Denies mouth / tongue / throat pain, dyspnea, nausea, vomiting, diarrhea, abdominal pain     O: Vitals:   Vital Signs Last 24 Hrs  T(C): 37.2 (2019 06:18), Max: 40.2 (2019 21:05)  T(F): 99 (2019 06:18), Max: 104.4 (2019 21:05)  HR: 75 (2019 06:18) (75 - 112)  BP: 137/78 (2019 06:18) (106/56 - 137/78)  BP(mean): --  RR: 18 (2019 06:18) (18 - 20)  SpO2: 100% (2019 06:18) (95% - 100%)    Admit weight: 111.4 kg   Daily Weight in k.5 (2019 10:00)  Today's weight:     Intake / Output:   02-16 @ 07:01  -  02-17 @ 07:00  --------------------------------------------------------  IN: 7403 mL / OUT: 9775 mL / NET: -2372 mL    PE:   Oropharynx: no erythema or ulcers  Oral Mucositis: n/a                                                       stGstrstastdstest:st st1st CVS: S1S2, RRR  Lungs: fine bibasilar crackles  Abdomen: soft, NT/ND, normoactive BS x 4Q  Extremities: no edema  Gastric Mucositis: n/a                                                 stGstrstastdstest:st st1st Intestinal Mucositis: n/a                                             stGstrstastdstest:st st1st Skin: no rash  TLC: C/D/I  Neuro: nonfocal  Pain: none    Labs:   CBC Full  -  ( 2019 06:51 )  WBC Count : 0.2 K/uL  Hemoglobin : 10.4 g/dL  Hematocrit : 31.0 %  Platelet Count - Automated : 14 K/uL  Mean Cell Volume : 102.0 fl  Mean Cell Hemoglobin : 34.2 pg  Mean Cell Hemoglobin Concentration : 33.4 gm/dL  Auto Neutrophil # : x  Auto Lymphocyte # : x  Auto Monocyte # : x  Auto Eosinophil # : x  Auto Basophil # : x  Auto Neutrophil % : x  Auto Lymphocyte % : x  Auto Monocyte % : x  Auto Eosinophil % : x  Auto Basophil % : x                          10.4   0.2   )-----------( 14       ( 2019 06:51 )             31.0         138  |  102  |  10  ----------------------------<  144<H>  3.6   |  24  |  0.70    Ca    8.4      2019 06:51  Phos  2.8       Mg     1.7         TPro  5.8<L>  /  Alb  3.1<L>  /  TBili  0.2  /  DBili  x   /  AST  24  /  ALT  46<H>  /  AlkPhos  185<H>        LIVER FUNCTIONS - ( 2019 06:51 )  Alb: 3.1 g/dL / Pro: 5.8 g/dL / ALK PHOS: 185 U/L / ALT: 46 U/L / AST: 24 U/L / GGT: x           Lactate Dehydrogenase, Serum: 166 U/L ( @ 06:51)    Cultures:   Culture - Urine (19 @ 14:10)    Specimen Source: .Urine Clean Catch (Midstream)    Culture Results:   <10,000 CFU/ml Normal Urogenital everton present    Culture - Sterility Check (19 @ 12:54)    Culture Results:   No growth    Specimen Source: STER [id number]    Radiology:   < from: Xray Chest 1 View- PORTABLE-Urgent (19 @ 09:12) >  IMPRESSION:  1.  The lungs are clear.    Meds:   Antimicrobials:   acyclovir   Oral Tab/Cap 400 milliGRAM(s) Oral every 8 hours   cefepime   IVPB 2000 milliGRAM(s) IV Intermittent every 8 hours  clotrimazole Lozenge 1 Lozenge Oral five times a day  fluconAZOLE   Tablet 400 milliGRAM(s) Oral daily    Heme / Onc:   cyclophosphamide IVPB (eMAR) 3415 milliGRAM(s) IV Intermittent every 24 hours  heparin  Infusion 464 Unit(s)/Hr IV Continuous <Continuous>  mesna IVPB (eMAR) 1337 milliGRAM(s) IV Intermittent every 3 hours    GI:  docusate sodium 100 milliGRAM(s) Oral three times a day PRN  pantoprazole    Tablet 40 milliGRAM(s) Oral before breakfast  polyethylene glycol 3350 17 Gram(s) Oral daily  senna 1 Tablet(s) Oral at bedtime PRN  sodium bicarbonate Mouth Rinse 10 milliLiter(s) Swish and Spit five times a day  ursodiol Capsule 300 milliGRAM(s) Oral two times a day with meals    Cardiovascular:   furosemide   Injectable 20 milliGRAM(s) IV Push every 24 hours  losartan 100 milliGRAM(s) Oral daily    Immunologic:   immune   globulin 10% (GAMMAGARD) IVPB 20 Gram(s) IV Intermittent <User Schedule>    Other medications:   acetaminophen   Tablet .. 650 milliGRAM(s) Oral every 6 hours  acetaminophen   Tablet .. 650 milliGRAM(s) Oral <User Schedule>  aprepitant 80 milliGRAM(s) Oral every 24 hours  benzonatate 100 milliGRAM(s) Oral every 8 hours  Biotene Dry Mouth Oral Rinse 5 milliLiter(s) Swish and Spit five times a day  diphenhydrAMINE   Injectable 25 milliGRAM(s) IV Push every 3 weeks  folic acid 1 milliGRAM(s) Oral daily  levETIRAcetam 500 milliGRAM(s) Oral two times a day  metoclopramide Injectable 10 milliGRAM(s) IV Push every 6 hours  multivitamin 1 Tablet(s) Oral daily  nystatin Powder 1 Application(s) Topical every 8 hours  ondansetron Injectable 8 milliGRAM(s) IV Push every 8 hours  sodium chloride 0.45% 1000 milliLiter(s) IV Continuous <Continuous>  sodium chloride 0.9%. 1000 milliLiter(s) IV Continuous <Continuous    PRN:   acetaminophen   Tablet .. 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  Post Allogeneic PBSCT day +4  HPC transplant on day 0 - continue transplant hydration for 24 hours post infusion of cells   H/o subdural hematomas on [facility]  Baseline CT Head non-con, no acute intracranial abnormality, possible sinusitis   Cough x 3 weeks, CXR clear, continue Tessalon 100 mg PRN, RVP (-)  + UTI on admission - started on cipro, febrile this AM, pancx, CXR pending and Cipro switched to Cefepime  Chronic back pain- Dilaudid 0.5 mg IVP q 4 hours PRN  Mild transaminitis, monitor  Neutropenic fevers, receivied Solumedrol 1 mg/kg IVP  for fever > 104. Received Cytoxan yesterday and plan again today        1. Infectious Disease:   acyclovir   Oral Tab/Cap 400 milliGRAM(s) Oral every 8 hours   cefepime   IVPB 2000 milliGRAM(s) IV Intermittent every 8 hours  clotrimazole Lozenge 1 Lozenge Oral five times a day  fluconAZOLE   Tablet 400 milliGRAM(s) Oral daily    2. VOD Prophylaxis: Actigall, Glutamine, Heparin (dosed at 100 units / kg / day)     3. GI Prophylaxis:    pantoprazole    Tablet 40 milliGRAM(s) Oral before breakfast    4. Mouthcare - NS / NaHCO3 rinses, Mycelex, Caphosol; Skin care     5. GVHD prophylaxis   CTX days + 3, + 4   , MMF to start on day + 5     6. Transfuse & replete electrolytes prn     7. IV hydration, daily weights, strict I&O, prn diuresis   Continue CTX hydration   Lasix 40 mg IV x 1     8. PO intake as tolerated, nutrition follow up as needed, MVI, folic acid     9. Antiemetics, anti-diarrhea medications:   ondansetron Injectable 8 milliGRAM(s) IV Push every 8 hours  LORazepam   Injectable 1 milliGRAM(s) IV Push every 6 hours PRN  metoclopramide Injectable 10 milliGRAM(s) IV Push every 6 hours  aprepitant 80 milliGRAM(s) Oral every 24 hours    10. OOB as tolerated, physical therapy consult if needed     11. Monitor coags / fibrinogen 2x week, vitamin K as needed     12. Monitor closely for clinical changes, monitor for fevers     13. Emotional support provided, plan of care discussed and questions addressed     14. Patient education done regarding plan of care, restrictions and discharge planning     15. Continue regular social work input     I have written the above note for Dr. Hernandes who performed service with me in the room.   Shelly Clemente NP-C ([phone])    I have seen and examined patient with NP, I agree with above note as scribed. 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    S: Patient seen and examined with HPC Transplant Team:   + fever and chills  + occasional cough  + chronic LB/sciatic pain  + fatigue     Denies mouth / tongue / throat pain, dyspnea, nausea, vomiting, diarrhea, abdominal pain     O: Vitals:   Vital Signs Last 24 Hrs  T(C): 37.2 (2019 06:18), Max: 40.2 (2019 21:05)  T(F): 99 (2019 06:18), Max: 104.4 (2019 21:05)  HR: 75 (2019 06:18) (75 - 112)  BP: 137/78 (2019 06:18) (106/56 - 137/78)  BP(mean): --  RR: 18 (2019 06:18) (18 - 20)  SpO2: 100% (2019 06:18) (95% - 100%)    Admit weight: 111.4 kg   Daily Weight in k.5 (2019 10:00)  Today's weight: 113.2 kg     Intake / Output:   -16 @ 07:01  -  -17 @ 07:00  --------------------------------------------------------  IN: 7403 mL / OUT: 9775 mL / NET: -2372 mL    PE:   Oropharynx: no erythema or ulcers  Oral Mucositis: n/a                                                       stGstrstastdstest:st st1st CVS: S1S2, RRR  Lungs: fine bibasilar crackles  Abdomen: soft, NT/ND, normoactive BS x 4Q  Extremities: no edema  Gastric Mucositis: n/a                                                 stGstrstastdstest:st st1st Intestinal Mucositis: n/a                                             stGstrstastdstest:st st1st Skin: no rash  TLC: C/D/I  Neuro: nonfocal  Pain: none    Labs:   CBC Full  -  ( 2019 06:51 )  WBC Count : 0.2 K/uL  Hemoglobin : 10.4 g/dL  Hematocrit : 31.0 %  Platelet Count - Automated : 14 K/uL  Mean Cell Volume : 102.0 fl  Mean Cell Hemoglobin : 34.2 pg  Mean Cell Hemoglobin Concentration : 33.4 gm/dL  Auto Neutrophil # : x  Auto Lymphocyte # : x  Auto Monocyte # : x  Auto Eosinophil # : x  Auto Basophil # : x  Auto Neutrophil % : x  Auto Lymphocyte % : x  Auto Monocyte % : x  Auto Eosinophil % : x  Auto Basophil % : x                          10.4   0.2   )-----------( 14       ( 2019 06:51 )             31.0         138  |  102  |  10  ----------------------------<  144<H>  3.6   |  24  |  0.70    Ca    8.4      2019 06:51  Phos  2.8       Mg     1.7         TPro  5.8<L>  /  Alb  3.1<L>  /  TBili  0.2  /  DBili  x   /  AST  24  /  ALT  46<H>  /  AlkPhos  185<H>        LIVER FUNCTIONS - ( 2019 06:51 )  Alb: 3.1 g/dL / Pro: 5.8 g/dL / ALK PHOS: 185 U/L / ALT: 46 U/L / AST: 24 U/L / GGT: x           Lactate Dehydrogenase, Serum: 166 U/L ( @ 06:51)    Cultures:   Culture - Urine (19 @ 14:10)    Specimen Source: .Urine Clean Catch (Midstream)    Culture Results:   <10,000 CFU/ml Normal Urogenital everton present    Culture - Sterility Check (19 @ 12:54)    Culture Results:   No growth    Specimen Source: STER [id number]    Radiology:   < from: Xray Chest 1 View- PORTABLE-Urgent (19 @ 09:12) >  IMPRESSION:  1.  The lungs are clear.    Meds:   Antimicrobials:   acyclovir   Oral Tab/Cap 400 milliGRAM(s) Oral every 8 hours   cefepime   IVPB 2000 milliGRAM(s) IV Intermittent every 8 hours  clotrimazole Lozenge 1 Lozenge Oral five times a day  fluconAZOLE   Tablet 400 milliGRAM(s) Oral daily    Heme / Onc:   cyclophosphamide IVPB (eMAR) 3415 milliGRAM(s) IV Intermittent every 24 hours  heparin  Infusion 464 Unit(s)/Hr IV Continuous <Continuous>  mesna IVPB (eMAR) 1337 milliGRAM(s) IV Intermittent every 3 hours    GI:  docusate sodium 100 milliGRAM(s) Oral three times a day PRN  pantoprazole    Tablet 40 milliGRAM(s) Oral before breakfast  polyethylene glycol 3350 17 Gram(s) Oral daily  senna 1 Tablet(s) Oral at bedtime PRN  sodium bicarbonate Mouth Rinse 10 milliLiter(s) Swish and Spit five times a day  ursodiol Capsule 300 milliGRAM(s) Oral two times a day with meals    Cardiovascular:   furosemide   Injectable 20 milliGRAM(s) IV Push every 24 hours  losartan 100 milliGRAM(s) Oral daily    Immunologic:   immune   globulin 10% (GAMMAGARD) IVPB 20 Gram(s) IV Intermittent <User Schedule>    Other medications:   acetaminophen   Tablet .. 650 milliGRAM(s) Oral every 6 hours  acetaminophen   Tablet .. 650 milliGRAM(s) Oral <User Schedule>  aprepitant 80 milliGRAM(s) Oral every 24 hours  benzonatate 100 milliGRAM(s) Oral every 8 hours  Biotene Dry Mouth Oral Rinse 5 milliLiter(s) Swish and Spit five times a day  diphenhydrAMINE   Injectable 25 milliGRAM(s) IV Push every 3 weeks  folic acid 1 milliGRAM(s) Oral daily  levETIRAcetam 500 milliGRAM(s) Oral two times a day  metoclopramide Injectable 10 milliGRAM(s) IV Push every 6 hours  multivitamin 1 Tablet(s) Oral daily  nystatin Powder 1 Application(s) Topical every 8 hours  ondansetron Injectable 8 milliGRAM(s) IV Push every 8 hours  sodium chloride 0.45% 1000 milliLiter(s) IV Continuous <Continuous>  sodium chloride 0.9%. 1000 milliLiter(s) IV Continuous <Continuous    PRN:   acetaminophen   Tablet .. 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  Post Allogeneic PBSCT day +4  H/o subdural hematomas on [place]  Baseline CT Head non-con, no acute intracranial abnormality, possible sinusitis   Cough x 3 weeks, CXR clear, continue Tessalon 100 mg PRN, RVP (-)  Chronic back pain- Dilaudid 0.5 mg IVP q 4 hours PRN  Mild transaminitis, monitor  Neutropenic fevers, receivied Solumedrol 1 mg/kg IVP  for fever > 104. Received Cytoxan yesterday and plan again today     1. Infectious Disease:   acyclovir   Oral Tab/Cap 400 milliGRAM(s) Oral every 8 hours   cefepime   IVPB 2000 milliGRAM(s) IV Intermittent every 8 hours  clotrimazole Lozenge 1 Lozenge Oral five times a day  fluconAZOLE   Tablet 400 milliGRAM(s) Oral daily    2. VOD Prophylaxis: Actigall, Glutamine, Heparin (dosed at 100 units / kg / day)     3. GI Prophylaxis:    pantoprazole    Tablet 40 milliGRAM(s) Oral before breakfast    4. Mouthcare - NS / NaHCO3 rinses, Mycelex, Caphosol; Skin care     5. GVHD prophylaxis   CTX days + 3, + 4   , MMF to start on day + 5     6. Transfuse & replete electrolytes prn     7. IV hydration, daily weights, strict I&O, prn diuresis   Continue CTX hydration   Lasix 40 mg IV x 2 today     8. PO intake as tolerated, nutrition follow up as needed, MVI, folic acid     9. Antiemetics, anti-diarrhea medications:   ondansetron Injectable 8 milliGRAM(s) IV Push every 8 hours  LORazepam   Injectable 1 milliGRAM(s) IV Push every 6 hours PRN  metoclopramide Injectable 10 milliGRAM(s) IV Push every 6 hours  aprepitant 80 milliGRAM(s) Oral every 24 hours    10. OOB as tolerated, physical therapy consult if needed     11. Monitor coags / fibrinogen 2x week, vitamin K as needed     12. Monitor closely for clinical changes, monitor for fevers     13. Emotional support provided, plan of care discussed and questions addressed     14. Patient education done regarding plan of care, restrictions and discharge planning     15. Continue regular social work input     I have written the above note for Dr. Hernandes who performed service with me in the room.   Shelly Clemente NP-C ([phone])    I have seen and examined patient with NP, I agree with above note as scribed. 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    S: Patient seen and examined with HPC Transplant Team:   + fever and chills  + occasional cough  + chronic LB/sciatic pain  + fatigue     Denies mouth / tongue / throat pain, dyspnea, nausea, vomiting, diarrhea, abdominal pain     O: Vitals:   Vital Signs Last 24 Hrs  T(C): 37.2 (2019 06:18), Max: 40.2 (2019 21:05)  T(F): 99 (2019 06:18), Max: 104.4 (2019 21:05)  HR: 75 (2019 06:18) (75 - 112)  BP: 137/78 (2019 06:18) (106/56 - 137/78)  BP(mean): --  RR: 18 (2019 06:18) (18 - 20)  SpO2: 100% (2019 06:18) (95% - 100%)    Admit weight: 111.4 kg   Daily Weight in k.5 (2019 10:00)  Today's weight: 113.2 kg     Intake / Output:   -16 @ 07:01  -  -17 @ 07:00  --------------------------------------------------------  IN: 7403 mL / OUT: 9775 mL / NET: -2372 mL    PE:   Oropharynx: no erythema or ulcers  Oral Mucositis: n/a                                                       stGstrstastdstest:st st1st CVS: S1S2, RRR  Lungs: fine bibasilar crackles  Abdomen: soft, NT/ND, normoactive BS x 4Q  Extremities: no edema  Gastric Mucositis: n/a                                                 stGstrstastdstest:st st1st Intestinal Mucositis: n/a                                             stGstrstastdstest:st st1st Skin: no rash  TLC: C/D/I  Neuro: nonfocal  Pain: none    Labs:   CBC Full  -  ( 2019 06:51 )  WBC Count : 0.2 K/uL  Hemoglobin : 10.4 g/dL  Hematocrit : 31.0 %  Platelet Count - Automated : 14 K/uL  Mean Cell Volume : 102.0 fl  Mean Cell Hemoglobin : 34.2 pg  Mean Cell Hemoglobin Concentration : 33.4 gm/dL  Auto Neutrophil # : x  Auto Lymphocyte # : x  Auto Monocyte # : x  Auto Eosinophil # : x  Auto Basophil # : x  Auto Neutrophil % : x  Auto Lymphocyte % : x  Auto Monocyte % : x  Auto Eosinophil % : x  Auto Basophil % : x                          10.4   0.2   )-----------( 14       ( 2019 06:51 )             31.0         138  |  102  |  10  ----------------------------<  144<H>  3.6   |  24  |  0.70    Ca    8.4      2019 06:51  Phos  2.8       Mg     1.7         TPro  5.8<L>  /  Alb  3.1<L>  /  TBili  0.2  /  DBili  x   /  AST  24  /  ALT  46<H>  /  AlkPhos  185<H>        LIVER FUNCTIONS - ( 2019 06:51 )  Alb: 3.1 g/dL / Pro: 5.8 g/dL / ALK PHOS: 185 U/L / ALT: 46 U/L / AST: 24 U/L / GGT: x           Lactate Dehydrogenase, Serum: 166 U/L ( @ 06:51)    Cultures:   Culture - Urine (19 @ 14:10)    Specimen Source: .Urine Clean Catch (Midstream)    Culture Results:   <10,000 CFU/ml Normal Urogenital evertno present    Culture - Sterility Check (19 @ 12:54)    Culture Results:   No growth    Specimen Source: STER [id number]    Radiology:   < from: Xray Chest 1 View- PORTABLE-Urgent (19 @ 09:12) >  IMPRESSION:  1.  The lungs are clear.    Meds:   Antimicrobials:   acyclovir   Oral Tab/Cap 400 milliGRAM(s) Oral every 8 hours   cefepime   IVPB 2000 milliGRAM(s) IV Intermittent every 8 hours  clotrimazole Lozenge 1 Lozenge Oral five times a day  fluconAZOLE   Tablet 400 milliGRAM(s) Oral daily    Heme / Onc:   cyclophosphamide IVPB (eMAR) 3415 milliGRAM(s) IV Intermittent every 24 hours  heparin  Infusion 464 Unit(s)/Hr IV Continuous <Continuous>  mesna IVPB (eMAR) 1337 milliGRAM(s) IV Intermittent every 3 hours    GI:  docusate sodium 100 milliGRAM(s) Oral three times a day PRN  pantoprazole    Tablet 40 milliGRAM(s) Oral before breakfast  polyethylene glycol 3350 17 Gram(s) Oral daily  senna 1 Tablet(s) Oral at bedtime PRN  sodium bicarbonate Mouth Rinse 10 milliLiter(s) Swish and Spit five times a day  ursodiol Capsule 300 milliGRAM(s) Oral two times a day with meals    Cardiovascular:   furosemide   Injectable 20 milliGRAM(s) IV Push every 24 hours  losartan 100 milliGRAM(s) Oral daily    Immunologic:   immune   globulin 10% (GAMMAGARD) IVPB 20 Gram(s) IV Intermittent <User Schedule>    Other medications:   acetaminophen   Tablet .. 650 milliGRAM(s) Oral every 6 hours  acetaminophen   Tablet .. 650 milliGRAM(s) Oral <User Schedule>  aprepitant 80 milliGRAM(s) Oral every 24 hours  benzonatate 100 milliGRAM(s) Oral every 8 hours  Biotene Dry Mouth Oral Rinse 5 milliLiter(s) Swish and Spit five times a day  diphenhydrAMINE   Injectable 25 milliGRAM(s) IV Push every 3 weeks  folic acid 1 milliGRAM(s) Oral daily  levETIRAcetam 500 milliGRAM(s) Oral two times a day  metoclopramide Injectable 10 milliGRAM(s) IV Push every 6 hours  multivitamin 1 Tablet(s) Oral daily  nystatin Powder 1 Application(s) Topical every 8 hours  ondansetron Injectable 8 milliGRAM(s) IV Push every 8 hours  sodium chloride 0.45% 1000 milliLiter(s) IV Continuous <Continuous>  sodium chloride 0.9%. 1000 milliLiter(s) IV Continuous <Continuous    PRN:   acetaminophen   Tablet .. 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  Post Allogeneic PBSCT day +4  H/o subdural hematomas on [place]  Baseline CT Head non-con, no acute intracranial abnormality, possible sinusitis   Cough x 3 weeks, CXR clear, continue Tessalon 100 mg PRN, RVP (-)  Chronic back pain- Dilaudid 0.5 mg IVP q 4 hours PRN  Mild transaminitis, monitor  Neutropenic fevers, receivied Solumedrol 1 mg/kg IVP  for fever > 104. Received Cytoxan yesterday and plan again today     1. Infectious Disease:   acyclovir   Oral Tab/Cap 400 milliGRAM(s) Oral every 8 hours   cefepime   IVPB 2000 milliGRAM(s) IV Intermittent every 8 hours  clotrimazole Lozenge 1 Lozenge Oral five times a day  fluconAZOLE   Tablet 400 milliGRAM(s) Oral daily    2. VOD Prophylaxis: Actigall, Glutamine, Heparin (dosed at 100 units / kg / day)     3. GI Prophylaxis:    pantoprazole    Tablet 40 milliGRAM(s) Oral before breakfast    4. Mouthcare - NS / NaHCO3 rinses, Mycelex, Caphosol; Skin care     5. GVHD prophylaxis   CTX days + 3, + 4   , MMF to start on day + 5     6. Transfuse & replete electrolytes prn   Thrombocytopenia: Platelets 1 bag today     7. IV hydration, daily weights, strict I&O, prn diuresis   Continue CTX hydration   Lasix 40 mg IV x 2 today     8. PO intake as tolerated, nutrition follow up as needed, MVI, folic acid     9. Antiemetics, anti-diarrhea medications:   ondansetron Injectable 8 milliGRAM(s) IV Push every 8 hours  LORazepam   Injectable 1 milliGRAM(s) IV Push every 6 hours PRN  metoclopramide Injectable 10 milliGRAM(s) IV Push every 6 hours  aprepitant 80 milliGRAM(s) Oral every 24 hours    10. OOB as tolerated, physical therapy consult if needed     11. Monitor coags / fibrinogen 2x week, vitamin K as needed     12. Monitor closely for clinical changes, monitor for fevers     13. Emotional support provided, plan of care discussed and questions addressed     14. Patient education done regarding plan of care, restrictions and discharge planning     15. Continue regular social work input     I have written the above note for Dr. Hernandes who performed service with me in the room.   Shelly Clemente NP-C ([phone])    I have seen and examined patient with NP, I agree with above note as scribed.

## 2019-02-18 LAB
ALBUMIN SERPL ELPH-MCNC: 2.9 G/DL — LOW (ref 3.3–5)
ALP SERPL-CCNC: 157 U/L — HIGH (ref 40–120)
ALT FLD-CCNC: 33 U/L — SIGNIFICANT CHANGE UP (ref 10–45)
ANION GAP SERPL CALC-SCNC: 11 MMOL/L — SIGNIFICANT CHANGE UP (ref 5–17)
APTT BLD: 39.4 SEC — HIGH (ref 27.5–36.3)
AST SERPL-CCNC: 16 U/L — SIGNIFICANT CHANGE UP (ref 10–40)
BILIRUB DIRECT SERPL-MCNC: <0.1 MG/DL — SIGNIFICANT CHANGE UP (ref 0–0.2)
BILIRUB INDIRECT FLD-MCNC: >0.1 MG/DL — LOW (ref 0.2–1)
BILIRUB SERPL-MCNC: 0.2 MG/DL — SIGNIFICANT CHANGE UP (ref 0.2–1.2)
BLD GP AB SCN SERPL QL: NEGATIVE — SIGNIFICANT CHANGE UP
BUN SERPL-MCNC: 13 MG/DL — SIGNIFICANT CHANGE UP (ref 7–23)
CALCIUM SERPL-MCNC: 8.1 MG/DL — LOW (ref 8.4–10.5)
CHLORIDE SERPL-SCNC: 101 MMOL/L — SIGNIFICANT CHANGE UP (ref 96–108)
CO2 SERPL-SCNC: 25 MMOL/L — SIGNIFICANT CHANGE UP (ref 22–31)
CREAT SERPL-MCNC: 0.71 MG/DL — SIGNIFICANT CHANGE UP (ref 0.5–1.3)
FIBRINOGEN PPP-MCNC: 699 MG/DL — HIGH (ref 350–510)
GLUCOSE SERPL-MCNC: 84 MG/DL — SIGNIFICANT CHANGE UP (ref 70–99)
HCT VFR BLD CALC: 26.7 % — LOW (ref 34.5–45)
HGB BLD-MCNC: 9.2 G/DL — LOW (ref 11.5–15.5)
INR BLD: 0.92 RATIO — SIGNIFICANT CHANGE UP (ref 0.88–1.16)
LDH SERPL L TO P-CCNC: 156 U/L — SIGNIFICANT CHANGE UP (ref 50–242)
MAGNESIUM SERPL-MCNC: 1.5 MG/DL — LOW (ref 1.6–2.6)
MCHC RBC-ENTMCNC: 34.3 GM/DL — SIGNIFICANT CHANGE UP (ref 32–36)
MCHC RBC-ENTMCNC: 35.4 PG — HIGH (ref 27–34)
MCV RBC AUTO: 103 FL — HIGH (ref 80–100)
PHOSPHATE SERPL-MCNC: 2.5 MG/DL — SIGNIFICANT CHANGE UP (ref 2.5–4.5)
PLATELET # BLD AUTO: 10 K/UL — CRITICAL LOW (ref 150–400)
POTASSIUM SERPL-MCNC: 3.3 MMOL/L — LOW (ref 3.5–5.3)
POTASSIUM SERPL-SCNC: 3.3 MMOL/L — LOW (ref 3.5–5.3)
PROT SERPL-MCNC: 5.5 G/DL — LOW (ref 6–8.3)
PROTHROM AB SERPL-ACNC: 10.5 SEC — SIGNIFICANT CHANGE UP (ref 10–12.9)
RBC # BLD: 2.59 M/UL — LOW (ref 3.8–5.2)
RBC # FLD: 12.4 % — SIGNIFICANT CHANGE UP (ref 10.3–14.5)
RH IG SCN BLD-IMP: POSITIVE — SIGNIFICANT CHANGE UP
SODIUM SERPL-SCNC: 137 MMOL/L — SIGNIFICANT CHANGE UP (ref 135–145)
WBC # BLD: 0.2 K/UL — CRITICAL LOW (ref 3.8–10.5)
WBC # FLD AUTO: 0.2 K/UL — CRITICAL LOW (ref 3.8–10.5)

## 2019-02-18 PROCEDURE — 99291 CRITICAL CARE FIRST HOUR: CPT

## 2019-02-18 RX ORDER — FUROSEMIDE 40 MG
40 TABLET ORAL
Qty: 0 | Refills: 0 | Status: COMPLETED | OUTPATIENT
Start: 2019-02-18 | End: 2019-02-18

## 2019-02-18 RX ORDER — FUROSEMIDE 40 MG
40 TABLET ORAL ONCE
Qty: 0 | Refills: 0 | Status: COMPLETED | OUTPATIENT
Start: 2019-02-18 | End: 2019-02-18

## 2019-02-18 RX ORDER — POTASSIUM CHLORIDE 20 MEQ
20 PACKET (EA) ORAL
Qty: 0 | Refills: 0 | Status: COMPLETED | OUTPATIENT
Start: 2019-02-18 | End: 2019-02-18

## 2019-02-18 RX ORDER — OXYCODONE HYDROCHLORIDE 5 MG/1
5 TABLET ORAL EVERY 6 HOURS
Qty: 0 | Refills: 0 | Status: DISCONTINUED | OUTPATIENT
Start: 2019-02-18 | End: 2019-02-24

## 2019-02-18 RX ORDER — MAGNESIUM SULFATE 500 MG/ML
2 VIAL (ML) INJECTION ONCE
Qty: 0 | Refills: 0 | Status: COMPLETED | OUTPATIENT
Start: 2019-02-18 | End: 2019-02-18

## 2019-02-18 RX ADMIN — HYDROMORPHONE HYDROCHLORIDE 0.5 MILLIGRAM(S): 2 INJECTION INTRAMUSCULAR; INTRAVENOUS; SUBCUTANEOUS at 02:18

## 2019-02-18 RX ADMIN — APREPITANT 80 MILLIGRAM(S): 80 CAPSULE ORAL at 17:50

## 2019-02-18 RX ADMIN — LOSARTAN POTASSIUM 100 MILLIGRAM(S): 100 TABLET, FILM COATED ORAL at 05:05

## 2019-02-18 RX ADMIN — Medication 1 MILLIGRAM(S): at 12:30

## 2019-02-18 RX ADMIN — TACROLIMUS 1 MILLIGRAM(S): 5 CAPSULE ORAL at 17:50

## 2019-02-18 RX ADMIN — Medication 40 MILLIGRAM(S): at 17:50

## 2019-02-18 RX ADMIN — Medication 10 MILLIGRAM(S): at 17:51

## 2019-02-18 RX ADMIN — ONDANSETRON 8 MILLIGRAM(S): 8 TABLET, FILM COATED ORAL at 01:43

## 2019-02-18 RX ADMIN — Medication 10 MILLIGRAM(S): at 12:31

## 2019-02-18 RX ADMIN — HYDROMORPHONE HYDROCHLORIDE 0.5 MILLIGRAM(S): 2 INJECTION INTRAMUSCULAR; INTRAVENOUS; SUBCUTANEOUS at 19:27

## 2019-02-18 RX ADMIN — Medication 50 GRAM(S): at 08:43

## 2019-02-18 RX ADMIN — Medication 40 MILLIGRAM(S): at 11:45

## 2019-02-18 RX ADMIN — Medication 10 MILLILITER(S): at 12:35

## 2019-02-18 RX ADMIN — Medication 1 LOZENGE: at 15:16

## 2019-02-18 RX ADMIN — HYDROMORPHONE HYDROCHLORIDE 0.5 MILLIGRAM(S): 2 INJECTION INTRAMUSCULAR; INTRAVENOUS; SUBCUTANEOUS at 08:09

## 2019-02-18 RX ADMIN — Medication 5 MILLILITER(S): at 15:17

## 2019-02-18 RX ADMIN — ONDANSETRON 8 MILLIGRAM(S): 8 TABLET, FILM COATED ORAL at 10:33

## 2019-02-18 RX ADMIN — HEPARIN SODIUM 4.64 UNIT(S)/HR: 5000 INJECTION INTRAVENOUS; SUBCUTANEOUS at 23:34

## 2019-02-18 RX ADMIN — LEVETIRACETAM 500 MILLIGRAM(S): 250 TABLET, FILM COATED ORAL at 17:50

## 2019-02-18 RX ADMIN — Medication 10 MILLILITER(S): at 08:09

## 2019-02-18 RX ADMIN — Medication 1 LOZENGE: at 19:24

## 2019-02-18 RX ADMIN — Medication 1 LOZENGE: at 08:14

## 2019-02-18 RX ADMIN — CEFEPIME 100 MILLIGRAM(S): 1 INJECTION, POWDER, FOR SOLUTION INTRAMUSCULAR; INTRAVENOUS at 13:01

## 2019-02-18 RX ADMIN — Medication 100 MILLIGRAM(S): at 21:29

## 2019-02-18 RX ADMIN — URSODIOL 300 MILLIGRAM(S): 250 TABLET, FILM COATED ORAL at 17:50

## 2019-02-18 RX ADMIN — Medication 1 TABLET(S): at 12:30

## 2019-02-18 RX ADMIN — Medication 100 MILLIGRAM(S): at 05:06

## 2019-02-18 RX ADMIN — Medication 50 MILLIEQUIVALENT(S): at 12:32

## 2019-02-18 RX ADMIN — MESNA 453.48 MILLIGRAM(S): 100 INJECTION, SOLUTION INTRAVENOUS at 00:01

## 2019-02-18 RX ADMIN — Medication 400 MILLIGRAM(S): at 05:05

## 2019-02-18 RX ADMIN — NYSTATIN CREAM 1 APPLICATION(S): 100000 CREAM TOPICAL at 13:03

## 2019-02-18 RX ADMIN — HYDROMORPHONE HYDROCHLORIDE 0.5 MILLIGRAM(S): 2 INJECTION INTRAMUSCULAR; INTRAVENOUS; SUBCUTANEOUS at 20:00

## 2019-02-18 RX ADMIN — Medication 5 MILLILITER(S): at 19:24

## 2019-02-18 RX ADMIN — LEVETIRACETAM 500 MILLIGRAM(S): 250 TABLET, FILM COATED ORAL at 05:05

## 2019-02-18 RX ADMIN — NYSTATIN CREAM 1 APPLICATION(S): 100000 CREAM TOPICAL at 05:06

## 2019-02-18 RX ADMIN — Medication 5 MILLILITER(S): at 12:31

## 2019-02-18 RX ADMIN — HYDROMORPHONE HYDROCHLORIDE 0.5 MILLIGRAM(S): 2 INJECTION INTRAMUSCULAR; INTRAVENOUS; SUBCUTANEOUS at 02:00

## 2019-02-18 RX ADMIN — HYDROMORPHONE HYDROCHLORIDE 0.5 MILLIGRAM(S): 2 INJECTION INTRAMUSCULAR; INTRAVENOUS; SUBCUTANEOUS at 08:35

## 2019-02-18 RX ADMIN — Medication 10 MILLIGRAM(S): at 23:34

## 2019-02-18 RX ADMIN — Medication 5 MILLILITER(S): at 23:34

## 2019-02-18 RX ADMIN — Medication 400 MILLIGRAM(S): at 13:02

## 2019-02-18 RX ADMIN — CEFEPIME 100 MILLIGRAM(S): 1 INJECTION, POWDER, FOR SOLUTION INTRAMUSCULAR; INTRAVENOUS at 05:07

## 2019-02-18 RX ADMIN — Medication 10 MILLILITER(S): at 19:24

## 2019-02-18 RX ADMIN — MYCOPHENOLATE MOFETIL 1000 MILLIGRAM(S): 250 CAPSULE ORAL at 13:02

## 2019-02-18 RX ADMIN — TACROLIMUS 1 MILLIGRAM(S): 5 CAPSULE ORAL at 05:06

## 2019-02-18 RX ADMIN — URSODIOL 300 MILLIGRAM(S): 250 TABLET, FILM COATED ORAL at 08:13

## 2019-02-18 RX ADMIN — Medication 50 MILLIEQUIVALENT(S): at 10:34

## 2019-02-18 RX ADMIN — Medication 480 MICROGRAM(S): at 15:16

## 2019-02-18 RX ADMIN — Medication 10 MILLIGRAM(S): at 05:06

## 2019-02-18 RX ADMIN — Medication 1 LOZENGE: at 12:31

## 2019-02-18 RX ADMIN — FLUCONAZOLE 400 MILLIGRAM(S): 150 TABLET ORAL at 12:30

## 2019-02-18 RX ADMIN — MYCOPHENOLATE MOFETIL 1000 MILLIGRAM(S): 250 CAPSULE ORAL at 21:28

## 2019-02-18 RX ADMIN — Medication 10 MILLILITER(S): at 15:17

## 2019-02-18 RX ADMIN — CEFEPIME 100 MILLIGRAM(S): 1 INJECTION, POWDER, FOR SOLUTION INTRAMUSCULAR; INTRAVENOUS at 21:27

## 2019-02-18 RX ADMIN — MYCOPHENOLATE MOFETIL 1000 MILLIGRAM(S): 250 CAPSULE ORAL at 05:05

## 2019-02-18 RX ADMIN — Medication 400 MILLIGRAM(S): at 21:28

## 2019-02-18 RX ADMIN — Medication 5 MILLILITER(S): at 08:14

## 2019-02-18 RX ADMIN — NYSTATIN CREAM 1 APPLICATION(S): 100000 CREAM TOPICAL at 21:29

## 2019-02-18 RX ADMIN — Medication 10 MILLILITER(S): at 23:34

## 2019-02-18 RX ADMIN — MESNA 453.48 MILLIGRAM(S): 100 INJECTION, SOLUTION INTRAVENOUS at 03:01

## 2019-02-18 RX ADMIN — PANTOPRAZOLE SODIUM 40 MILLIGRAM(S): 20 TABLET, DELAYED RELEASE ORAL at 05:06

## 2019-02-18 RX ADMIN — Medication 1 LOZENGE: at 23:34

## 2019-02-18 RX ADMIN — ONDANSETRON 8 MILLIGRAM(S): 8 TABLET, FILM COATED ORAL at 17:51

## 2019-02-18 RX ADMIN — Medication 100 MILLIGRAM(S): at 13:02

## 2019-02-18 NOTE — PROGRESS NOTE ADULT - SUBJECTIVE AND OBJECTIVE BOX
DATE:      CONSULTANT:  Kizzy Berger M.D.      REPORT TITLE:  Pulmonary Critical Care Consultation      The patient of Dr. Espinoza.      HISTORY OF PRESENT ILLNESS:  This is a 71-year-old lady, who was admitted from   the emergency room because of severe weakness, chest pain, and shortness of   breath.  She came through the emergency room, she was found to have non-STEMI,   her cardiac enzymes were elevated in addition to anemia.  The patient has been   evaluated by Cardiology and by GI.  She has known history of GI bleed in the   past, when she was evaluated in May 2017.  She has possible chronic liver   disease according to GI.  She has a known history of coronary artery disease,   she underwent TAVR for aortic stenosis.  She is followed by Cardiology.  She has   a history of chronic renal failure, COPD, and obstructive sleep apnea.  She is   on CPAP at night, which according to her she uses.      SOCIAL HISTORY:  She used to smoke, but she quit smoking many years ago.  No   history of alcohol or drug abuse.      OTHER MEDICAL PROBLEMS:  Include, diabetes mellitus, anemia, aortic stenosis as   I mentioned, hypertension, hyperlipidemia, and chronic renal failure.      ALLERGIES:  She is allergic to TRAMADOL.      CURRENT MEDICATIONS:  She is on vitamin D, insulin, aspirin, and Plavix.  She is   on Lipitor, Pepcid, Lasix orally 40 mg daily.  She is on Protonix, Aldactone,   iron, gabapentin, Synthroid, and she was given nitroglycerin as needed.      PHYSICAL EXAMINATION:  General:  Alert and oriented x3.  She is in no acute   respiratory distress.  Vital Signs:  She is afebrile.  Respiratory rate is 18,   oxygen saturation is 98% on nasal cannula, blood pressure 109/54.  HEENT:   Supple neck.  No lymphadenopathy.  JVD is not elevated.  Lungs:  Reveals   diminished breath sounds with few crackles in the bases.  I do not hear any   wheezing.  Cardiovascular:  Distant S1, S2 with no S3.  Abdomen:  Soft,    nontender.  No organomegaly.  Extremities:  No clubbing or cyanosis.  There is   1+ edema in both lower extremities.      PERTINENT LABORATORIES:  Chest x-ray, I reviewed, and it shows CHF changes with   cardiomegaly.      Her BUN and creatinine are elevated at 36 and 2.26.  Troponins are high at 0.52,   and rising up to 4.3.  White count is 7.5, hemoglobin was 7.4, up to 9 after   transfusion.  TSH is low.      IMPRESSION AND PLAN:   1. Chest pain with elevated troponins, non-ST elevation myocardial infarction,       possible likely cardiac ischemia.  Currently, on aspirin and Plavix.   2. GI bleed.  History of GI bleed with anemia.  I recommended continued       aspirin and Plavix, and workup after cardiac workup is completed.  The       patient was received 1 unit of blood, and we will monitor her hemoglobin.   3. For chronic renal failure.  We will continue with Lasix, and we will       continue to monitor her creatinine level.  Renal consultation is obtained.   4. 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   5. Continue nocturnal CPAP for obstructive sleep apnea.   6. Glycemic control.  Monitor and continue with Synthroid.   I will follow with you closely, and make further recommendations as needed.      Critical care time excluding procedures is 41 minutes.         _____________________               ____________________________________   DATE AND TIME                       Kizzy Berger M.D.         DOMONIQUE/JOSE-#[id number]   DD:  02/08/2018 10:27:26      DT:  02/08/2018 11:59:53      cc:   Yo Leon MD            [facility]      CONSULTATION               PATRICIA TRUJILLO   MRN#: [id number]   ROOM: [id number]    ACCT#: [id number]      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    S: Patient seen and examined with HPC Transplant Team:   + fever and chills  + occasional cough  + chronic LB/sciatic pain  + fatigue     Denies mouth / tongue / throat pain, dyspnea, cough, nausea, vomiting, diarrhea, abdominal pain     O: Vitals:   Vital Signs Last 24 Hrs  T(C): 37.3 (2019 05:30), Max: 37.7 (2019 09:30)  T(F): 99.1 (2019 05:30), Max: 99.9 (2019 09:30)  HR: 84 (2019 05:30) (81 - 88)  BP: 141/78 (2019 05:30) (113/76 - 141/78)  BP(mean): --  RR: 18 (2019 05:30) (18 - 18)  SpO2: 100% (2019 05:30) (97% - 100%)    Admit weight: 111.4 kg   Daily Weight in k.2 (2019 09:30)  Today's weight:     Intake / Output:   -17 @ 07:01  -  18 @ 07:00  --------------------------------------------------------  IN: 7819.4 mL / OUT: 9375 mL / NET: -1555.6 mL        PE:   Oropharynx: no erythema or ulcers  Oral Mucositis: n/a                                                       stGstrstastdstest:st st1st CVS: S1S2, RRR  Lungs: fine bibasilar crackles  Abdomen: soft, NT/ND, normoactive BS x 4Q  Extremities: no edema  Gastric Mucositis: n/a                                                 stGstrstastdstest:st st1st Intestinal Mucositis: n/a                                             stGstrstastdstest:st st1st Skin: no rash  TLC: C/D/I  Neuro: nonfocal  Pain: none    Labs:   CBC Full  -  ( 2019 06:54 )  WBC Count : 0.2 K/uL  Hemoglobin : 9.2 g/dL  Hematocrit : 26.7 %  Platelet Count - Automated : x  Mean Cell Volume : 103.0 fl  Mean Cell Hemoglobin : 35.4 pg  Mean Cell Hemoglobin Concentration : 34.3 gm/dL  Auto Neutrophil # : x  Auto Lymphocyte # : x  Auto Monocyte # : x  Auto Eosinophil # : x  Auto Basophil # : x  Auto Neutrophil % : x  Auto Lymphocyte % : x  Auto Monocyte % : x  Auto Eosinophil % : x  Auto Basophil % : x                          9.2    0.2   )-----------( x        ( 2019 06:54 )             26.7         138  |  102  |  10  ----------------------------<  144<H>  3.6   |  24  |  0.70    Ca    8.4      2019 06:51  Phos  2.8       Mg     1.7         TPro  5.8<L>  /  Alb  3.1<L>  /  TBili  0.2  /  DBili  x   /  AST  24  /  ALT  46<H>  /  AlkPhos  185<H>      PT/INR - ( 2019 06:54 )   PT: 10.5 sec;   INR: 0.92 ratio         PTT - ( 2019 06:54 )  PTT:39.4 sec  LIVER FUNCTIONS - ( 2019 06:51 )  Alb: 3.1 g/dL / Pro: 5.8 g/dL / ALK PHOS: 185 U/L / ALT: 46 U/L / AST: 24 U/L / GGT: x           Cultures:   Culture - Urine (19 @ 18:41)    Specimen Source: .Urine Clean Catch (Midstream)    Culture Results:   <10,000 CFU/ml Normal Urogenital everton present    Culture - Blood (19 @ 18:30)    Specimen Source: .Blood Blood-Peripheral    Culture Results:   No growth to date.    Radiology:   < from: Xray Chest 1 View- PORTABLE-Urgent (19 @ 09:12) >  IMPRESSION:  1.  The lungs are clear.    Meds:   Antimicrobials:   acyclovir   Oral Tab/Cap 400 milliGRAM(s) Oral every 8 hours  cefepime   IVPB 2000 milliGRAM(s) IV Intermittent every 8 hours  clotrimazole Lozenge 1 Lozenge Oral five times a day  fluconAZOLE   Tablet 400 milliGRAM(s) Oral daily    Heme / Onc:   heparin  Infusion 464 Unit(s)/Hr IV Continuous <Continuous>    GI:  docusate sodium 100 milliGRAM(s) Oral three times a day PRN  pantoprazole    Tablet 40 milliGRAM(s) Oral before breakfast  polyethylene glycol 3350 17 Gram(s) Oral daily  senna 1 Tablet(s) Oral at bedtime PRN  sodium bicarbonate Mouth Rinse 10 milliLiter(s) Swish and Spit five times a day  ursodiol Capsule 300 milliGRAM(s) Oral two times a day with meals    Cardiovascular:   losartan 100 milliGRAM(s) Oral daily    Immunologic:   filgrastim-sndz Injectable 480 MICROGram(s) SubCutaneous daily  immune   globulin 10% (GAMMAGARD) IVPB 20 Gram(s) IV Intermittent <User Schedule>  mycophenolate mofetil 1000 milliGRAM(s) Oral <User Schedule>  tacrolimus 1 milliGRAM(s) Oral two times a day    Other medications:   acetaminophen   Tablet .. 650 milliGRAM(s) Oral every 6 hours  acetaminophen   Tablet .. 650 milliGRAM(s) Oral <User Schedule>  aprepitant 80 milliGRAM(s) Oral every 24 hours  benzonatate 100 milliGRAM(s) Oral every 8 hours  Biotene Dry Mouth Oral Rinse 5 milliLiter(s) Swish and Spit five times a day  diphenhydrAMINE   Injectable 25 milliGRAM(s) IV Push every 3 weeks  folic acid 1 milliGRAM(s) Oral daily  levETIRAcetam 500 milliGRAM(s) Oral two times a day  lidocaine/prilocaine Cream 1 Application(s) Topical daily  metoclopramide Injectable 10 milliGRAM(s) IV Push every 6 hours  multivitamin 1 Tablet(s) Oral daily  nystatin Powder 1 Application(s) Topical every 8 hours  ondansetron Injectable 8 milliGRAM(s) IV Push every 8 hours  sodium chloride 0.45% 1000 milliLiter(s) IV Continuous <Continuous>  sodium chloride 0.9%. 1000 milliLiter(s) IV Continuous <Continuous>      PRN:   acetaminophen   Tablet .. 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  Post Allogeneic PBSCT day +5  H/o subdural hematomas on [facility]  Baseline CT Head non-con, no acute intracranial abnormality, possible sinusitis   Cough x 3 weeks, CXR clear, continue Tessalon 100 mg PRN, RVP (-)  Chronic back pain- Dilaudid 0.5 mg IVP q 4 hours PRN  Mild transaminitis, monitor  Neutropenic fevers, receivied Solumedrol 1 mg/kg IVP  for fever > 104. Received Cytoxan yesterday and plan again today       1. Infectious Disease:   acyclovir   Oral Tab/Cap 400 milliGRAM(s) Oral every 8 hours  cefepime   IVPB 2000 milliGRAM(s) IV Intermittent every 8 hours  clotrimazole Lozenge 1 Lozenge Oral five times a day  fluconAZOLE   Tablet 400 milliGRAM(s) Oral daily    2. VOD Prophylaxis: Actigall, Glutamine, Heparin (dosed at 100 units / kg / day)     3. GI Prophylaxis:    pantoprazole    Tablet 40 milliGRAM(s) Oral before breakfast    4. Mouthcare - NS / NaHCO3 rinses, Mycelex, Caphosol; Skin care     5. GVHD prophylaxis   CTX days + 3, + 4   , MMF to start on day + 5     6. Transfuse & replete electrolytes prn   Thrombocytopenia: Platelets 1 bag today     7. IV hydration, daily weights, strict I&O, prn diuresis     8. PO intake as tolerated, nutrition follow up as needed, MVI, folic acid     9. Antiemetics, anti-diarrhea medications:   ondansetron Injectable 8 milliGRAM(s) IV Push every 8 hours  LORazepam   Injectable 1 milliGRAM(s) IV Push every 6 hours PRN  metoclopramide Injectable 10 milliGRAM(s) IV Push every 6 hours  aprepitant 80 milliGRAM(s) Oral every 24 hours    10. OOB as tolerated, physical therapy consult if needed     11. Monitor coags / fibrinogen 2x week, vitamin K as needed     12. Monitor closely for clinical changes, monitor for fevers     13. Emotional support provided, plan of care discussed and questions addressed     14. Patient education done regarding plan of care, restrictions and discharge planning     15. Continue regular social work input     I have written the above note for Dr. Hernandes who performed service with me in the room.   Shelly Clemente NP-C ([phone])    I have seen and examined patient with NP, I agree with above note as scribed. 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    S: Patient seen and examined with HPC Transplant Team:   + occasional cough  + chronic LB/sciatic pain  + fatigue     Denies mouth / tongue / throat pain, dyspnea, cough, nausea, vomiting, diarrhea, abdominal pain     O: Vitals:   Vital Signs Last 24 Hrs  T(C): 37.3 (2019 05:30), Max: 37.7 (2019 09:30)  T(F): 99.1 (2019 05:30), Max: 99.9 (2019 09:30)  HR: 84 (2019 05:30) (81 - 88)  BP: 141/78 (2019 05:30) (113/76 - 141/78)  BP(mean): --  RR: 18 (2019 05:30) (18 - 18)  SpO2: 100% (2019 05:30) (97% - 100%)    Admit weight: 111.4 kg   Daily Weight in k.2 (2019 09:30)  Today's weight:     Intake / Output:    @ 07:01  -  02-18 @ 07:00  --------------------------------------------------------  IN: 7819.4 mL / OUT: 9375 mL / NET: -1555.6 mL        PE:   Oropharynx: no erythema or ulcers  Oral Mucositis: n/a                                                       stGstrstastdstest:st st1st CVS: S1S2, RRR  Lungs: fine bibasilar crackles  Abdomen: soft, NT/ND, normoactive BS x 4Q  Extremities: no edema  Gastric Mucositis: n/a                                                 stGstrstastdstest:st st1st Intestinal Mucositis: n/a                                             stGstrstastdstest:st st1st Skin: no rash  TLC: C/D/I  Neuro: nonfocal  Pain: none    Labs:   CBC Full  -  ( 2019 06:54 )  WBC Count : 0.2 K/uL  Hemoglobin : 9.2 g/dL  Hematocrit : 26.7 %  Platelet Count - Automated : x  Mean Cell Volume : 103.0 fl  Mean Cell Hemoglobin : 35.4 pg  Mean Cell Hemoglobin Concentration : 34.3 gm/dL  Auto Neutrophil # : x  Auto Lymphocyte # : x  Auto Monocyte # : x  Auto Eosinophil # : x  Auto Basophil # : x  Auto Neutrophil % : x  Auto Lymphocyte % : x  Auto Monocyte % : x  Auto Eosinophil % : x  Auto Basophil % : x                          9.2    0.2   )-----------( x        ( 2019 06:54 )             26.7         138  |  102  |  10  ----------------------------<  144<H>  3.6   |  24  |  0.70    Ca    8.4      2019 06:51  Phos  2.8       Mg     1.7         TPro  5.8<L>  /  Alb  3.1<L>  /  TBili  0.2  /  DBili  x   /  AST  24  /  ALT  46<H>  /  AlkPhos  185<H>      PT/INR - ( 2019 06:54 )   PT: 10.5 sec;   INR: 0.92 ratio         PTT - ( 2019 06:54 )  PTT:39.4 sec  LIVER FUNCTIONS - ( 2019 06:51 )  Alb: 3.1 g/dL / Pro: 5.8 g/dL / ALK PHOS: 185 U/L / ALT: 46 U/L / AST: 24 U/L / GGT: x           Cultures:   Culture - Urine (19 @ 18:41)    Specimen Source: .Urine Clean Catch (Midstream)    Culture Results:   <10,000 CFU/ml Normal Urogenital everton present    Culture - Blood (19 @ 18:30)    Specimen Source: .Blood Blood-Peripheral    Culture Results:   No growth to date.    Radiology:   < from: Xray Chest 1 View- PORTABLE-Urgent (19 @ 09:12) >  IMPRESSION:  1.  The lungs are clear.    Meds:   Antimicrobials:   acyclovir   Oral Tab/Cap 400 milliGRAM(s) Oral every 8 hours  cefepime   IVPB 2000 milliGRAM(s) IV Intermittent every 8 hours  clotrimazole Lozenge 1 Lozenge Oral five times a day  fluconAZOLE   Tablet 400 milliGRAM(s) Oral daily    Heme / Onc:   heparin  Infusion 464 Unit(s)/Hr IV Continuous <Continuous>    GI:  docusate sodium 100 milliGRAM(s) Oral three times a day PRN  pantoprazole    Tablet 40 milliGRAM(s) Oral before breakfast  polyethylene glycol 3350 17 Gram(s) Oral daily  senna 1 Tablet(s) Oral at bedtime PRN  sodium bicarbonate Mouth Rinse 10 milliLiter(s) Swish and Spit five times a day  ursodiol Capsule 300 milliGRAM(s) Oral two times a day with meals    Cardiovascular:   losartan 100 milliGRAM(s) Oral daily    Immunologic:   filgrastim-sndz Injectable 480 MICROGram(s) SubCutaneous daily  immune   globulin 10% (GAMMAGARD) IVPB 20 Gram(s) IV Intermittent <User Schedule>  mycophenolate mofetil 1000 milliGRAM(s) Oral <User Schedule>  tacrolimus 1 milliGRAM(s) Oral two times a day    Other medications:   acetaminophen   Tablet .. 650 milliGRAM(s) Oral every 6 hours  acetaminophen   Tablet .. 650 milliGRAM(s) Oral <User Schedule>  aprepitant 80 milliGRAM(s) Oral every 24 hours  benzonatate 100 milliGRAM(s) Oral every 8 hours  Biotene Dry Mouth Oral Rinse 5 milliLiter(s) Swish and Spit five times a day  diphenhydrAMINE   Injectable 25 milliGRAM(s) IV Push every 3 weeks  folic acid 1 milliGRAM(s) Oral daily  levETIRAcetam 500 milliGRAM(s) Oral two times a day  lidocaine/prilocaine Cream 1 Application(s) Topical daily  metoclopramide Injectable 10 milliGRAM(s) IV Push every 6 hours  multivitamin 1 Tablet(s) Oral daily  nystatin Powder 1 Application(s) Topical every 8 hours  ondansetron Injectable 8 milliGRAM(s) IV Push every 8 hours  sodium chloride 0.45% 1000 milliLiter(s) IV Continuous <Continuous>  sodium chloride 0.9%. 1000 milliLiter(s) IV Continuous <Continuous>      PRN:   acetaminophen   Tablet .. 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  Post Allogeneic PBSCT day +5  H/o subdural hematomas on [facility]  Baseline CT Head non-con, no acute intracranial abnormality, possible sinusitis   Cough x 3 weeks, CXR clear, continue Tessalon 100 mg PRN, RVP (-)  Chronic back pain- Dilaudid 0.5 mg IVP q 4 hours PRN  Mild transaminitis, monitor  Neutropenic fevers, receivied Solumedrol 1 mg/kg IVP  for fever > 104. Received Cytoxan yesterday and plan again today       1. Infectious Disease:   acyclovir   Oral Tab/Cap 400 milliGRAM(s) Oral every 8 hours  cefepime   IVPB 2000 milliGRAM(s) IV Intermittent every 8 hours  clotrimazole Lozenge 1 Lozenge Oral five times a day  fluconAZOLE   Tablet 400 milliGRAM(s) Oral daily    2. VOD Prophylaxis: Actigall, Glutamine, Heparin (dosed at 100 units / kg / day)     3. GI Prophylaxis:    pantoprazole    Tablet 40 milliGRAM(s) Oral before breakfast    4. Mouthcare - NS / NaHCO3 rinses, Mycelex, Caphosol; Skin care     5. GVHD prophylaxis   CTX days + 3, + 4   , MMF to start on day + 5     6. Transfuse & replete electrolytes prn   Thrombocytopenia: Platelets 1 bag today     7. IV hydration, daily weights, strict I&O, prn diuresis     8. PO intake as tolerated, nutrition follow up as needed, MVI, folic acid     9. Antiemetics, anti-diarrhea medications:   ondansetron Injectable 8 milliGRAM(s) IV Push every 8 hours  LORazepam   Injectable 1 milliGRAM(s) IV Push every 6 hours PRN  metoclopramide Injectable 10 milliGRAM(s) IV Push every 6 hours  aprepitant 80 milliGRAM(s) Oral every 24 hours    10. OOB as tolerated, physical therapy consult if needed     11. Monitor coags / fibrinogen 2x week, vitamin K as needed     12. Monitor closely for clinical changes, monitor for fevers     13. Emotional support provided, plan of care discussed and questions addressed     14. Patient education done regarding plan of care, restrictions and discharge planning     15. Continue regular social work input     I have written the above note for Dr. Hernandes who performed service with me in the room.   Shelly Clemente NP-C ([phone])    I have seen and examined patient with NP, I agree with above note as scribed. 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    S: Patient seen and examined with HPC Transplant Team:   + occasional cough  + chronic LB/sciatic pain  + fatigue     Denies mouth / tongue / throat pain, dyspnea, nausea, vomiting, diarrhea, abdominal pain     O: Vitals:   Vital Signs Last 24 Hrs  T(C): 37.3 (2019 05:30), Max: 37.7 (2019 09:30)  T(F): 99.1 (2019 05:30), Max: 99.9 (2019 09:30)  HR: 84 (2019 05:30) (81 - 88)  BP: 141/78 (2019 05:30) (113/76 - 141/78)  BP(mean): --  RR: 18 (2019 05:30) (18 - 18)  SpO2: 100% (2019 05:30) (97% - 100%)    Admit weight: 111.4 kg   Daily Weight in k.2 (2019 09:30)  Today's weight: 114.3 kg     Intake / Output:    @ 07:01  -  18 @ 07:00  --------------------------------------------------------  IN: 7819.4 mL / OUT: 9375 mL / NET: -1555.6 mL    PE:   Oropharynx: no erythema or ulcers  Oral Mucositis: n/a                                                       stGstrstastdstest:st st1st CVS: S1S2, RRR  Lungs: fine bibasilar crackles  Abdomen: soft, NT/ND, normoactive BS x 4Q  Extremities: no edema  Gastric Mucositis: n/a                                                 stGstrstastdstest:st st1st Intestinal Mucositis: n/a                                             stGstrstastdstest:st st1st Skin: no rash  TLC: C/D/I  Neuro: nonfocal  Pain: none    Labs:   CBC Full  -  ( 2019 06:54 )  WBC Count : 0.2 K/uL  Hemoglobin : 9.2 g/dL  Hematocrit : 26.7 %  Platelet Count - Automated : x  Mean Cell Volume : 103.0 fl  Mean Cell Hemoglobin : 35.4 pg  Mean Cell Hemoglobin Concentration : 34.3 gm/dL  Auto Neutrophil # : x  Auto Lymphocyte # : x  Auto Monocyte # : x  Auto Eosinophil # : x  Auto Basophil # : x  Auto Neutrophil % : x  Auto Lymphocyte % : x  Auto Monocyte % : x  Auto Eosinophil % : x  Auto Basophil % : x                          9.2    0.2   )-----------( x        ( 2019 06:54 )             26.7         138  |  102  |  10  ----------------------------<  144<H>  3.6   |  24  |  0.70    Ca    8.4      2019 06:51  Phos  2.8       Mg     1.7         TPro  5.8<L>  /  Alb  3.1<L>  /  TBili  0.2  /  DBili  x   /  AST  24  /  ALT  46<H>  /  AlkPhos  185<H>      PT/INR - ( 2019 06:54 )   PT: 10.5 sec;   INR: 0.92 ratio         PTT - ( 2019 06:54 )  PTT:39.4 sec  LIVER FUNCTIONS - ( 2019 06:51 )  Alb: 3.1 g/dL / Pro: 5.8 g/dL / ALK PHOS: 185 U/L / ALT: 46 U/L / AST: 24 U/L / GGT: x           Cultures:   Culture - Urine (19 @ 18:41)    Specimen Source: .Urine Clean Catch (Midstream)    Culture Results:   <10,000 CFU/ml Normal Urogenital everton present    Culture - Blood (19 @ 18:30)    Specimen Source: .Blood Blood-Peripheral    Culture Results:   No growth to date.    Radiology:   < from: Xray Chest 1 View- PORTABLE-Urgent (19 @ 09:12) >  IMPRESSION:  1.  The lungs are clear.    Meds:   Antimicrobials:   acyclovir   Oral Tab/Cap 400 milliGRAM(s) Oral every 8 hours  cefepime   IVPB 2000 milliGRAM(s) IV Intermittent every 8 hours  clotrimazole Lozenge 1 Lozenge Oral five times a day  fluconAZOLE   Tablet 400 milliGRAM(s) Oral daily    Heme / Onc:   heparin  Infusion 464 Unit(s)/Hr IV Continuous <Continuous>    GI:  docusate sodium 100 milliGRAM(s) Oral three times a day PRN  pantoprazole    Tablet 40 milliGRAM(s) Oral before breakfast  polyethylene glycol 3350 17 Gram(s) Oral daily  senna 1 Tablet(s) Oral at bedtime PRN  sodium bicarbonate Mouth Rinse 10 milliLiter(s) Swish and Spit five times a day  ursodiol Capsule 300 milliGRAM(s) Oral two times a day with meals    Cardiovascular:   losartan 100 milliGRAM(s) Oral daily    Immunologic:   filgrastim-sndz Injectable 480 MICROGram(s) SubCutaneous daily  immune   globulin 10% (GAMMAGARD) IVPB 20 Gram(s) IV Intermittent <User Schedule>  mycophenolate mofetil 1000 milliGRAM(s) Oral <User Schedule>  tacrolimus 1 milliGRAM(s) Oral two times a day    Other medications:   acetaminophen   Tablet .. 650 milliGRAM(s) Oral every 6 hours  acetaminophen   Tablet .. 650 milliGRAM(s) Oral <User Schedule>  aprepitant 80 milliGRAM(s) Oral every 24 hours  benzonatate 100 milliGRAM(s) Oral every 8 hours  Biotene Dry Mouth Oral Rinse 5 milliLiter(s) Swish and Spit five times a day  diphenhydrAMINE   Injectable 25 milliGRAM(s) IV Push every 3 weeks  folic acid 1 milliGRAM(s) Oral daily  levETIRAcetam 500 milliGRAM(s) Oral two times a day  lidocaine/prilocaine Cream 1 Application(s) Topical daily  metoclopramide Injectable 10 milliGRAM(s) IV Push every 6 hours  multivitamin 1 Tablet(s) Oral daily  nystatin Powder 1 Application(s) Topical every 8 hours  ondansetron Injectable 8 milliGRAM(s) IV Push every 8 hours  sodium chloride 0.45% 1000 milliLiter(s) IV Continuous <Continuous>  sodium chloride 0.9%. 1000 milliLiter(s) IV Continuous <Continuous>    PRN:   acetaminophen   Tablet .. 650 milliGRAM(s) Oral every 6 hours PRN  diphenhydrAMINE   Injectable 25 milliGRAM(s) IV Push every 4 hours PRN  docusate sodium 100 milliGRAM(s) Oral three times a day PRN  HYDROmorphone  Injectable 0.5 milliGRAM(s) IV Push every 4 hours PRN  oxyCODONE    IR 5 milliGRAM(s) Oral every 6 hours PRN  senna 1 Tablet(s) Oral at bedtime PRN    A/P:   58 year old F with a history of Ph+ ALL s/p autologous pbSCT in 2016; relapsed, s/p Inotuzumab now here for Haploidentical PBSCT from son  Post Allogeneic PBSCT day +5  H/o subdural hematomas on [facility]  Baseline CT Head non-con, no acute intracranial abnormality, possible sinusitis   Cough x 3 weeks, CXR clear, continue Tessalon 100 mg PRN, RVP (-)  Chronic back pain- Dilaudid 0.5 mg IVP q 4 hours PRN  Mild transaminitis - resolved   Neutropenic fevers, receivied Solumedrol 1 mg/kg IVP  for fever > 104. S/p Cytoxan  and      1. Infectious Disease:   acyclovir   Oral Tab/Cap 400 milliGRAM(s) Oral every 8 hours  cefepime   IVPB 2000 milliGRAM(s) IV Intermittent every 8 hours  clotrimazole Lozenge 1 Lozenge Oral five times a day  fluconAZOLE   Tablet 400 milliGRAM(s) Oral daily    2. VOD Prophylaxis: Actigall, Glutamine, Heparin (dosed at 100 units / kg / day)     3. GI Prophylaxis:    pantoprazole    Tablet 40 milliGRAM(s) Oral before breakfast    4. Mouthcare - NS / NaHCO3 rinses, Mycelex, Caphosol; Skin care     5. GVHD prophylaxis   CTX days + 3, + 4   , MMF to start on day + 5     6. Transfuse & replete electrolytes prn   Thrombocytopenia: Platelets 1 bag today   Lasix 40 mg IV BID today   Hypokalemia: replace kcl   Hypomagnesemia: replace magnesium sulfate     7. IV hydration, daily weights, strict I&O, prn diuresis     8. PO intake as tolerated, nutrition follow up as needed, MVI, folic acid     9. Antiemetics, anti-diarrhea medications:   ondansetron Injectable 8 milliGRAM(s) IV Push every 8 hours  LORazepam   Injectable 1 milliGRAM(s) IV Push every 6 hours PRN  metoclopramide Injectable 10 milliGRAM(s) IV Push every 6 hours  aprepitant 80 milliGRAM(s) Oral every 24 hours    10. OOB as tolerated, physical therapy consult if needed     11. Monitor coags / fibrinogen 2x week, vitamin K as needed     12. Monitor closely for clinical changes, monitor for fevers     13. Emotional support provided, plan of care discussed and questions addressed     14. Patient education done regarding plan of care, restrictions and discharge planning     15. Continue regular social work input     I have written the above note for Dr. Hernandes who performed service with me in the room.   Shelly Clemente NP-C ([phone])    I have seen and examined patient with NP, I agree with above note as scribed.

## 2019-02-18 NOTE — PROGRESS NOTE ADULT - ATTENDING COMMENTS
58 year old female with PMH [place] chromosome positive ALL diagnosed in 2016 s/p R HyperCVAD X 4 cycles, IT MTX X 2, s/p autologous stem cell transplantation (12/16/16),  who presented in October 2018 with subdural hemorrhage and relapsed disease confirmed by peripheral blood flow cytometry treated with Inotuzumab X 2 cycles.  Bone marrow biopsy on 1/23/19 showed remission with FISH and PCR for BCR-ABL translocation negative on the BM specimen but peripheral blood testing on 1/31 showed .004% BCR-ABL transcript with negative FISH suggesting MRD positivity.  Patient was on Ponatinib, DC 1/31/19. LFT's improved off drug. CR2    Patient was admitted for haploidentical stem cell transplantation with fludarabine/cytoxan/TBI conditioning. She is s/p HPC transplant, day +4  For high-dose Cytoxan on days +3, +4(GVHD prophylaxis); then begin Tacrolimis, Cellcept on day +5.  Begin Zarxio on day +5    Overnight patient with high temps, Tmax 104.4, given solumedrol for haplostorm.  She is afebrile now, due for day2 of cytoxan today.    Transfuse plt today.    Neutropenic fever- on Cefepime, culture from 2/14 negative, repeat pending.  CXR negative.  on Acyclovir, Fluconazole prophylaxis.  Monitor I/O's/weights- lasix today.    History of SDH, continue Keppra. CT scan baseline 2/7/19 negative for SDH.     History of HTN continue Losartan.     VOD prophylaxis as per protocol. 58 year old female with PMH [place] chromosome positive ALL diagnosed in 2016 s/p R HyperCVAD X 4 cycles, IT MTX X 2, s/p autologous stem cell transplantation (12/16/16),  who presented in October 2018 with subdural hemorrhage and relapsed disease confirmed by peripheral blood flow cytometry treated with Inotuzumab X 2 cycles.  Bone marrow biopsy on 1/23/19 showed remission with FISH and PCR for BCR-ABL translocation negative on the BM specimen but peripheral blood testing on 1/31 showed .004% BCR-ABL transcript with negative FISH suggesting MRD positivity.  Patient was on Ponatinib, DC 1/31/19. LFT's improved off drug.  Now in CR2    Patient was admitted for haploidentical stem cell transplantation with fludarabine/cytoxan/TBI conditioning. She is s/p HPC transplant, day +5.  For high-dose Cytoxan on days +3, +4(GVHD prophylaxis); then begin Tacrolimis, Cellcept on day +5.  Begin Zarxio on day +5    Patient with high temps on Saturday, Tmax 104.4, given solumedrol for haplostorm.  She is afebrile since, completed 2 days of post cytoxan.  Begin zarxio today.        Neutropenic fever- on Cefepime, culture from 2/14, 2/16 negative.  CXR negative.  On Acyclovir, Fluconazole prophylaxis.  Monitor I/O's/weights- lasix today.  VOD prophylaxis as per protocol.  Encourage nutrition, mouth care, ambulation.    History of SDH, continue Keppra. CT scan baseline 2/7/19 negative for SDH.   History of HTN continue Losartan.

## 2019-02-19 LAB
ALBUMIN SERPL ELPH-MCNC: 3 G/DL — LOW (ref 3.3–5)
ALP SERPL-CCNC: 163 U/L — HIGH (ref 40–120)
ALT FLD-CCNC: 27 U/L — SIGNIFICANT CHANGE UP (ref 10–45)
ANION GAP SERPL CALC-SCNC: 10 MMOL/L — SIGNIFICANT CHANGE UP (ref 5–17)
AST SERPL-CCNC: 15 U/L — SIGNIFICANT CHANGE UP (ref 10–40)
BILIRUB SERPL-MCNC: 0.2 MG/DL — SIGNIFICANT CHANGE UP (ref 0.2–1.2)
BUN SERPL-MCNC: 14 MG/DL — SIGNIFICANT CHANGE UP (ref 7–23)
CALCIUM SERPL-MCNC: 8.8 MG/DL — SIGNIFICANT CHANGE UP (ref 8.4–10.5)
CHLORIDE SERPL-SCNC: 103 MMOL/L — SIGNIFICANT CHANGE UP (ref 96–108)
CO2 SERPL-SCNC: 26 MMOL/L — SIGNIFICANT CHANGE UP (ref 22–31)
CREAT SERPL-MCNC: 0.66 MG/DL — SIGNIFICANT CHANGE UP (ref 0.5–1.3)
CULTURE RESULTS: SIGNIFICANT CHANGE UP
CULTURE RESULTS: SIGNIFICANT CHANGE UP
GLUCOSE SERPL-MCNC: 94 MG/DL — SIGNIFICANT CHANGE UP (ref 70–99)
HCT VFR BLD CALC: 27.1 % — LOW (ref 34.5–45)
HGB BLD-MCNC: 9.4 G/DL — LOW (ref 11.5–15.5)
LDH SERPL L TO P-CCNC: 151 U/L — SIGNIFICANT CHANGE UP (ref 50–242)
MAGNESIUM SERPL-MCNC: 2.2 MG/DL — SIGNIFICANT CHANGE UP (ref 1.6–2.6)
MCHC RBC-ENTMCNC: 34.6 GM/DL — SIGNIFICANT CHANGE UP (ref 32–36)
MCHC RBC-ENTMCNC: 35.8 PG — HIGH (ref 27–34)
MCV RBC AUTO: 104 FL — HIGH (ref 80–100)
PHOSPHATE SERPL-MCNC: 2.6 MG/DL — SIGNIFICANT CHANGE UP (ref 2.5–4.5)
PLATELET # BLD AUTO: 9 K/UL — CRITICAL LOW (ref 150–400)
POTASSIUM SERPL-MCNC: 3.5 MMOL/L — SIGNIFICANT CHANGE UP (ref 3.5–5.3)
POTASSIUM SERPL-SCNC: 3.5 MMOL/L — SIGNIFICANT CHANGE UP (ref 3.5–5.3)
PROT SERPL-MCNC: 5.5 G/DL — LOW (ref 6–8.3)
RBC # BLD: 2.61 M/UL — LOW (ref 3.8–5.2)
RBC # FLD: 12.5 % — SIGNIFICANT CHANGE UP (ref 10.3–14.5)
SODIUM SERPL-SCNC: 139 MMOL/L — SIGNIFICANT CHANGE UP (ref 135–145)
SPECIMEN SOURCE: SIGNIFICANT CHANGE UP
SPECIMEN SOURCE: SIGNIFICANT CHANGE UP
WBC # BLD: 0.2 K/UL — CRITICAL LOW (ref 3.8–10.5)
WBC # FLD AUTO: 0.2 K/UL — CRITICAL LOW (ref 3.8–10.5)

## 2019-02-19 PROCEDURE — 99291 CRITICAL CARE FIRST HOUR: CPT

## 2019-02-19 RX ADMIN — CEFEPIME 100 MILLIGRAM(S): 1 INJECTION, POWDER, FOR SOLUTION INTRAMUSCULAR; INTRAVENOUS at 14:46

## 2019-02-19 RX ADMIN — MYCOPHENOLATE MOFETIL 1000 MILLIGRAM(S): 250 CAPSULE ORAL at 14:47

## 2019-02-19 RX ADMIN — HYDROMORPHONE HYDROCHLORIDE 0.5 MILLIGRAM(S): 2 INJECTION INTRAMUSCULAR; INTRAVENOUS; SUBCUTANEOUS at 14:05

## 2019-02-19 RX ADMIN — LEVETIRACETAM 500 MILLIGRAM(S): 250 TABLET, FILM COATED ORAL at 05:21

## 2019-02-19 RX ADMIN — TACROLIMUS 1 MILLIGRAM(S): 5 CAPSULE ORAL at 05:22

## 2019-02-19 RX ADMIN — Medication 100 MILLIGRAM(S): at 05:21

## 2019-02-19 RX ADMIN — Medication 1 MILLIGRAM(S): at 12:19

## 2019-02-19 RX ADMIN — HEPARIN SODIUM 4.64 UNIT(S)/HR: 5000 INJECTION INTRAVENOUS; SUBCUTANEOUS at 21:08

## 2019-02-19 RX ADMIN — Medication 100 MILLIGRAM(S): at 14:47

## 2019-02-19 RX ADMIN — Medication 400 MILLIGRAM(S): at 21:07

## 2019-02-19 RX ADMIN — Medication 400 MILLIGRAM(S): at 14:47

## 2019-02-19 RX ADMIN — FLUCONAZOLE 400 MILLIGRAM(S): 150 TABLET ORAL at 12:12

## 2019-02-19 RX ADMIN — Medication 10 MILLIGRAM(S): at 05:22

## 2019-02-19 RX ADMIN — HYDROMORPHONE HYDROCHLORIDE 0.5 MILLIGRAM(S): 2 INJECTION INTRAMUSCULAR; INTRAVENOUS; SUBCUTANEOUS at 22:23

## 2019-02-19 RX ADMIN — LOSARTAN POTASSIUM 100 MILLIGRAM(S): 100 TABLET, FILM COATED ORAL at 05:22

## 2019-02-19 RX ADMIN — HYDROMORPHONE HYDROCHLORIDE 0.5 MILLIGRAM(S): 2 INJECTION INTRAMUSCULAR; INTRAVENOUS; SUBCUTANEOUS at 08:05

## 2019-02-19 RX ADMIN — ONDANSETRON 8 MILLIGRAM(S): 8 TABLET, FILM COATED ORAL at 01:13

## 2019-02-19 RX ADMIN — Medication 10 MILLILITER(S): at 08:05

## 2019-02-19 RX ADMIN — Medication 10 MILLILITER(S): at 12:11

## 2019-02-19 RX ADMIN — HYDROMORPHONE HYDROCHLORIDE 0.5 MILLIGRAM(S): 2 INJECTION INTRAMUSCULAR; INTRAVENOUS; SUBCUTANEOUS at 01:17

## 2019-02-19 RX ADMIN — TACROLIMUS 1 MILLIGRAM(S): 5 CAPSULE ORAL at 18:24

## 2019-02-19 RX ADMIN — Medication 5 MILLILITER(S): at 16:00

## 2019-02-19 RX ADMIN — Medication 5 MILLILITER(S): at 08:05

## 2019-02-19 RX ADMIN — PANTOPRAZOLE SODIUM 40 MILLIGRAM(S): 20 TABLET, DELAYED RELEASE ORAL at 05:22

## 2019-02-19 RX ADMIN — Medication 1 LOZENGE: at 12:11

## 2019-02-19 RX ADMIN — CEFEPIME 100 MILLIGRAM(S): 1 INJECTION, POWDER, FOR SOLUTION INTRAMUSCULAR; INTRAVENOUS at 05:21

## 2019-02-19 RX ADMIN — NYSTATIN CREAM 1 APPLICATION(S): 100000 CREAM TOPICAL at 05:22

## 2019-02-19 RX ADMIN — ONDANSETRON 8 MILLIGRAM(S): 8 TABLET, FILM COATED ORAL at 10:00

## 2019-02-19 RX ADMIN — HYDROMORPHONE HYDROCHLORIDE 0.5 MILLIGRAM(S): 2 INJECTION INTRAMUSCULAR; INTRAVENOUS; SUBCUTANEOUS at 19:05

## 2019-02-19 RX ADMIN — Medication 100 MILLIGRAM(S): at 21:08

## 2019-02-19 RX ADMIN — HYDROMORPHONE HYDROCHLORIDE 0.5 MILLIGRAM(S): 2 INJECTION INTRAMUSCULAR; INTRAVENOUS; SUBCUTANEOUS at 18:25

## 2019-02-19 RX ADMIN — Medication 1 LOZENGE: at 08:05

## 2019-02-19 RX ADMIN — MYCOPHENOLATE MOFETIL 1000 MILLIGRAM(S): 250 CAPSULE ORAL at 05:21

## 2019-02-19 RX ADMIN — Medication 10 MILLIGRAM(S): at 12:13

## 2019-02-19 RX ADMIN — HYDROMORPHONE HYDROCHLORIDE 0.5 MILLIGRAM(S): 2 INJECTION INTRAMUSCULAR; INTRAVENOUS; SUBCUTANEOUS at 00:52

## 2019-02-19 RX ADMIN — SODIUM CHLORIDE 20 MILLILITER(S): 9 INJECTION INTRAMUSCULAR; INTRAVENOUS; SUBCUTANEOUS at 21:08

## 2019-02-19 RX ADMIN — URSODIOL 300 MILLIGRAM(S): 250 TABLET, FILM COATED ORAL at 08:06

## 2019-02-19 RX ADMIN — Medication 5 MILLILITER(S): at 12:11

## 2019-02-19 RX ADMIN — Medication 10 MILLIGRAM(S): at 18:25

## 2019-02-19 RX ADMIN — HYDROMORPHONE HYDROCHLORIDE 0.5 MILLIGRAM(S): 2 INJECTION INTRAMUSCULAR; INTRAVENOUS; SUBCUTANEOUS at 22:50

## 2019-02-19 RX ADMIN — Medication 10 MILLILITER(S): at 19:54

## 2019-02-19 RX ADMIN — MYCOPHENOLATE MOFETIL 1000 MILLIGRAM(S): 250 CAPSULE ORAL at 21:07

## 2019-02-19 RX ADMIN — LEVETIRACETAM 500 MILLIGRAM(S): 250 TABLET, FILM COATED ORAL at 18:24

## 2019-02-19 RX ADMIN — HYDROMORPHONE HYDROCHLORIDE 0.5 MILLIGRAM(S): 2 INJECTION INTRAMUSCULAR; INTRAVENOUS; SUBCUTANEOUS at 08:35

## 2019-02-19 RX ADMIN — NYSTATIN CREAM 1 APPLICATION(S): 100000 CREAM TOPICAL at 21:08

## 2019-02-19 RX ADMIN — Medication 400 MILLIGRAM(S): at 05:22

## 2019-02-19 RX ADMIN — Medication 1 TABLET(S): at 12:14

## 2019-02-19 RX ADMIN — Medication 1 LOZENGE: at 16:00

## 2019-02-19 RX ADMIN — URSODIOL 300 MILLIGRAM(S): 250 TABLET, FILM COATED ORAL at 18:24

## 2019-02-19 RX ADMIN — Medication 5 MILLILITER(S): at 19:54

## 2019-02-19 RX ADMIN — Medication 480 MICROGRAM(S): at 18:23

## 2019-02-19 RX ADMIN — CEFEPIME 100 MILLIGRAM(S): 1 INJECTION, POWDER, FOR SOLUTION INTRAMUSCULAR; INTRAVENOUS at 21:07

## 2019-02-19 RX ADMIN — NYSTATIN CREAM 1 APPLICATION(S): 100000 CREAM TOPICAL at 14:49

## 2019-02-19 RX ADMIN — Medication 1 LOZENGE: at 19:54

## 2019-02-19 RX ADMIN — Medication 10 MILLILITER(S): at 16:00

## 2019-02-19 RX ADMIN — HYDROMORPHONE HYDROCHLORIDE 0.5 MILLIGRAM(S): 2 INJECTION INTRAMUSCULAR; INTRAVENOUS; SUBCUTANEOUS at 13:35

## 2019-02-19 NOTE — PROGRESS NOTE ADULT - SUBJECTIVE AND OBJECTIVE BOX
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    S: Patient seen and examined with HPC Transplant Team:     Denies mouth / tongue / throat pain, dyspnea, cough, nausea, vomiting, diarrhea, abdominal pain     O: Vitals:   Vital Signs Last 24 Hrs  T(C): 36 (2019 08:44), Max: 36.9 (2019 09:40)  T(F): 96.8 (2019 08:44), Max: 98.4 (2019 09:40)  HR: 80 (2019 08:44) (67 - 86)  BP: 131/85 (2019 08:44) (106/57 - 139/84)  BP(mean): --  RR: 18 (2019 08:44) (18 - 20)  SpO2: 98% (2019 08:44) (98% - 100%)    Admit weight: 111.4 kg   Daily Weight in k.2 (2019 08:44)  Today's weight:     Intake / Output:    @ 07:01  -  -19 @ 07:00    PE:   Oropharynx: no erythema or ulcers  Oral Mucositis: n/a                                                       stGstrstastdstest:st st1st CVS: S1S2, RRR  Lungs: fine bibasilar crackles  Abdomen: soft, NT/ND, normoactive BS x 4Q  Extremities: no edema  Gastric Mucositis: n/a                                                 stGstrstastdstest:st st1st Intestinal Mucositis: n/a                                             stGstrstastdstest:st st1st Skin: no rash  TLC: C/D/I  Neuro: nonfocal  Pain: none--------------------------------------------------------  IN: 4926 mL / OUT: 6275 mL / NET: -1349 mL        Labs:   CBC Full  -  ( 2019 07:04 )  WBC Count : 0.2 K/uL  Hemoglobin : 9.4 g/dL  Hematocrit : 27.1 %  Platelet Count - Automated : 9 K/uL  Mean Cell Volume : 104.0 fl  Mean Cell Hemoglobin : 35.8 pg  Mean Cell Hemoglobin Concentration : 34.6 gm/dL  Auto Neutrophil # : x  Auto Lymphocyte # : x  Auto Monocyte # : x  Auto Eosinophil # : x  Auto Basophil # : x  Auto Neutrophil % : x  Auto Lymphocyte % : x  Auto Monocyte % : x  Auto Eosinophil % : x  Auto Basophil % : x                          9.4    0.2   )-----------( 9        ( 2019 07:04 )             27.1         139  |  103  |  14  ----------------------------<  94  3.5   |  26  |  0.66    Ca    8.8      2019 07:06  Phos  2.6       Mg     2.2         TPro  5.5<L>  /  Alb  3.0<L>  /  TBili  0.2  /  DBili  x   /  AST  15  /  ALT  27  /  AlkPhos  163<H>      PT/INR - ( 2019 06:54 )   PT: 10.5 sec;   INR: 0.92 ratio         PTT - ( 2019 06:54 )  PTT:39.4 sec  LIVER FUNCTIONS - ( 2019 07:06 )  Alb: 3.0 g/dL / Pro: 5.5 g/dL / ALK PHOS: 163 U/L / ALT: 27 U/L / AST: 15 U/L / GGT: x           Lactate Dehydrogenase, Serum: 151 U/L ( @ 07:06)    Cultures:   Culture Results: urine  <10,000 CFU/ml Normal Urogenital everton present (19 @ 18:41)    Culture Results: blood  No growth to date. (19 @ 18:30)    Culture Results: blood  No growth to date. (19 @ 18:30)    Culture Results: urine  <10,000 CFU/ml Normal Urogenital everton present (19 @ 14:10)    Radiology:   EXAM:  XR CHEST PORTABLE URGENT 1V                        PROCEDURE DATE:  2019    IMPRESSION:  1.  The lungs are clear.    EXAM:  CT BRAIN                        PROCEDURE DATE:  2019    IMPRESSION:  No acute intracranial abnormality is noted. No subdural collections are   visualized. If the patient has new and persistent symptoms, consider   follow-up brain MRI with and without contrast, if there are no MRI or   contrast contraindications.  New moderate mucosal thickening involves the paranasal sinuses. Correlate   for possible sinusitis      Meds:   Antimicrobials:   acyclovir   Oral Tab/Cap 400 milliGRAM(s) Oral every 8 hours  cefepime   IVPB      cefepime   IVPB 2000 milliGRAM(s) IV Intermittent every 8 hours  clotrimazole Lozenge 1 Lozenge Oral five times a day  fluconAZOLE   Tablet 400 milliGRAM(s) Oral daily      Heme / Onc:   heparin  Infusion 464 Unit(s)/Hr IV Continuous <Continuous>      GI:  docusate sodium 100 milliGRAM(s) Oral three times a day PRN  pantoprazole    Tablet 40 milliGRAM(s) Oral before breakfast  polyethylene glycol 3350 17 Gram(s) Oral daily  senna 1 Tablet(s) Oral at bedtime PRN  sodium bicarbonate Mouth Rinse 10 milliLiter(s) Swish and Spit five times a day  ursodiol Capsule 300 milliGRAM(s) Oral two times a day with meals      Cardiovascular:   losartan 100 milliGRAM(s) Oral daily      Immunologic:   filgrastim-sndz Injectable 480 MICROGram(s) SubCutaneous daily  immune   globulin 10% (GAMMAGARD) IVPB 20 Gram(s) IV Intermittent <User Schedule>  mycophenolate mofetil 1000 milliGRAM(s) Oral <User Schedule>  tacrolimus 1 milliGRAM(s) Oral two times a day      Other medications:   acetaminophen   Tablet .. 650 milliGRAM(s) Oral every 6 hours  acetaminophen   Tablet .. 650 milliGRAM(s) Oral <User Schedule>  benzonatate 100 milliGRAM(s) Oral every 8 hours  Biotene Dry Mouth Oral Rinse 5 milliLiter(s) Swish and Spit five times a day  diphenhydrAMINE   Injectable 25 milliGRAM(s) IV Push every 3 weeks  folic acid 1 milliGRAM(s) Oral daily  levETIRAcetam 500 milliGRAM(s) Oral two times a day  lidocaine/prilocaine Cream 1 Application(s) Topical daily  metoclopramide Injectable 10 milliGRAM(s) IV Push every 6 hours  multivitamin 1 Tablet(s) Oral daily  nystatin Powder 1 Application(s) Topical every 8 hours  ondansetron Injectable 8 milliGRAM(s) IV Push every 8 hours  sodium chloride 0.9%. 1000 milliLiter(s) IV Continuous <Continuous>      PRN:   acetaminophen   Tablet .. 650 milliGRAM(s) Oral every 6 hours PRN  diphenhydrAMINE   Injectable 25 milliGRAM(s) IV Push every 4 hours PRN  docusate sodium 100 milliGRAM(s) Oral three times a day PRN  HYDROmorphone  Injectable 0.5 milliGRAM(s) IV Push every 4 hours PRN  oxyCODONE    IR 5 milliGRAM(s) Oral every 6 hours PRN  senna 1 Tablet(s) Oral at bedtime PRN    A/P:   58 year old F with a history of Ph+ ALL s/p autologous pbSCT in 2016; relapsed, s/p Inotuzumab now here for Haploidentical PBSCT from son  Post Allogeneic PBSCT day + 6   H/[facility] hematomas on [facility]  Baseline CT Head non-con, no acute intracranial abnormality, possible sinusitis   Cough x 3 weeks, CXR clear, continue Tessalon 100 mg PRN, RVP (-)  Chronic back pain- Dilaudid 0.5 mg IVP q 4 hours PRN  Mild transaminitis - resolved     1. Infectious Disease:   acyclovir   Oral Tab/Cap 400 milliGRAM(s) Oral every 8 hours  cefepime   IVPB      cefepime   IVPB 2000 milliGRAM(s) IV Intermittent every 8 hours  clotrimazole Lozenge 1 Lozenge Oral five times a day  fluconAZOLE   Tablet 400 milliGRAM(s) Oral daily    2. VOD Prophylaxis: Actigall, Glutamine, Heparin (dosed at 100 units / kg / day)     3. GI Prophylaxis:    pantoprazole    Tablet 40 milliGRAM(s) Oral before breakfast    4. Mouthcare - NS / NaHCO3 rinses, Mycelex, Caphosol; Skin care     5. GVHD prophylaxis   CTX days + 3, + 4   mycophenolate mofetil 1000 milliGRAM(s) Oral <User Schedule>  tacrolimus 1 milliGRAM(s) Oral two times a day    6. Transfuse & replete electrolytes prn   1 bag platelets     7. IV hydration, daily weights, strict I&O, prn diuresis     8. PO intake as tolerated, nutrition follow up as needed, MVI, folic acid     9. Antiemetics, anti-diarrhea medications:   ondansetron Injectable 8 milliGRAM(s) IV Push every 8 hours  metoclopramide Injectable 10 milliGRAM(s) IV Push every 6 hours    10. OOB as tolerated, physical therapy consult if needed     11. Monitor coags / fibrinogen 2x week, vitamin K as needed     12. Monitor closely for clinical changes, monitor for fevers     13. Emotional support provided, plan of care discussed and questions addressed     14. Patient education done regarding plan of care, restrictions and discharge planning     15. Continue regular social work input     I have written the above note for Dr. Logan who performed service with me in the room.   Janett Barrow NP-C ([phone])    I have seen and examined patient with NP, I agree with above note as scribed. 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    S: Patient seen and examined with HPC Transplant Team:   + mild, intermittent chronic cough   + chronic back pain   + fatigue   Denies mouth / tongue / throat pain, dyspnea, nausea, vomiting, diarrhea, abdominal pain     O: Vitals:   Vital Signs Last 24 Hrs  T(C): 36 (2019 08:44), Max: 36.9 (2019 09:40)  T(F): 96.8 (2019 08:44), Max: 98.4 (2019 09:40)  HR: 80 (2019 08:44) (67 - 86)  BP: 131/85 (2019 08:44) (106/57 - 139/84)  BP(mean): --  RR: 18 (2019 08:44) (18 - 20)  SpO2: 98% (2019 08:44) (98% - 100%)    Admit weight: 111.4 kg   Daily Weight in k.2 (2019 08:44)  Today's weight: 111.2kg     Intake / Output:    @ 07:01  -  -19 @ 07:00  IN: 4926 mL / OUT: 6275 mL / NET: -1349 mL    PE:   Oropharynx: no erythema or ulcers  Oral Mucositis: n/a                                                       stGstrstastdstest:st st1st CVS: S1S2, RRR  Lungs: CTA throughout bilaterally   Abdomen: soft, NT/ND, normoactive BS x 4Q  Extremities: no edema  Gastric Mucositis: n/a                                                 stGstrstastdstest:st st1st Intestinal Mucositis: n/a                                             stGstrstastdstest:st st1st Skin: no rash  TLC: C/D/I  Neuro: A&O x 3   Pain: chronic back pain     Labs:   CBC Full  -  ( 2019 07:04 )  WBC Count : 0.2 K/uL  Hemoglobin : 9.4 g/dL  Hematocrit : 27.1 %  Platelet Count - Automated : 9 K/uL  Mean Cell Volume : 104.0 fl  Mean Cell Hemoglobin : 35.8 pg  Mean Cell Hemoglobin Concentration : 34.6 gm/dL  Auto Neutrophil # : x  Auto Lymphocyte # : x  Auto Monocyte # : x  Auto Eosinophil # : x  Auto Basophil # : x  Auto Neutrophil % : x  Auto Lymphocyte % : x  Auto Monocyte % : x  Auto Eosinophil % : x  Auto Basophil % : x                          9.4    0.2   )-----------( 9        ( 2019 07:04 )             27.1         139  |  103  |  14  ----------------------------<  94  3.5   |  26  |  0.66    Ca    8.8      2019 07:06  Phos  2.6       Mg     2.2         TPro  5.5<L>  /  Alb  3.0<L>  /  TBili  0.2  /  DBili  x   /  AST  15  /  ALT  27  /  AlkPhos  163<H>      PT/INR - ( 2019 06:54 )   PT: 10.5 sec;   INR: 0.92 ratio         PTT - ( 2019 06:54 )  PTT:39.4 sec  LIVER FUNCTIONS - ( 2019 07:06 )  Alb: 3.0 g/dL / Pro: 5.5 g/dL / ALK PHOS: 163 U/L / ALT: 27 U/L / AST: 15 U/L / GGT: x           Lactate Dehydrogenase, Serum: 151 U/L ( @ 07:06)    Cultures:   Culture Results: urine  <10,000 CFU/ml Normal Urogenital everton present (19 @ 18:41)    Culture Results: blood  No growth to date. (19 @ 18:30)    Culture Results: blood  No growth to date. (19 @ 18:30)    Culture Results: urine  <10,000 CFU/ml Normal Urogenital everton present (19 @ 14:10)    Radiology:   EXAM:  XR CHEST PORTABLE URGENT 1V                        PROCEDURE DATE:  2019    IMPRESSION:  1.  The lungs are clear.    EXAM:  CT BRAIN                        PROCEDURE DATE:  2019    IMPRESSION:  No acute intracranial abnormality is noted. No subdural collections are   visualized. If the patient has new and persistent symptoms, consider   follow-up brain MRI with and without contrast, if there are no MRI or   contrast contraindications.  New moderate mucosal thickening involves the paranasal sinuses. Correlate   for possible sinusitis      Meds:   Antimicrobials:   acyclovir   Oral Tab/Cap 400 milliGRAM(s) Oral every 8 hours  cefepime   IVPB      cefepime   IVPB 2000 milliGRAM(s) IV Intermittent every 8 hours  clotrimazole Lozenge 1 Lozenge Oral five times a day  fluconAZOLE   Tablet 400 milliGRAM(s) Oral daily      Heme / Onc:   heparin  Infusion 464 Unit(s)/Hr IV Continuous <Continuous>      GI:  docusate sodium 100 milliGRAM(s) Oral three times a day PRN  pantoprazole    Tablet 40 milliGRAM(s) Oral before breakfast  polyethylene glycol 3350 17 Gram(s) Oral daily  senna 1 Tablet(s) Oral at bedtime PRN  sodium bicarbonate Mouth Rinse 10 milliLiter(s) Swish and Spit five times a day  ursodiol Capsule 300 milliGRAM(s) Oral two times a day with meals      Cardiovascular:   losartan 100 milliGRAM(s) Oral daily      Immunologic:   filgrastim-sndz Injectable 480 MICROGram(s) SubCutaneous daily  immune   globulin 10% (GAMMAGARD) IVPB 20 Gram(s) IV Intermittent <User Schedule>  mycophenolate mofetil 1000 milliGRAM(s) Oral <User Schedule>  tacrolimus 1 milliGRAM(s) Oral two times a day      Other medications:   acetaminophen   Tablet .. 650 milliGRAM(s) Oral every 6 hours  acetaminophen   Tablet .. 650 milliGRAM(s) Oral <User Schedule>  benzonatate 100 milliGRAM(s) Oral every 8 hours  Biotene Dry Mouth Oral Rinse 5 milliLiter(s) Swish and Spit five times a day  diphenhydrAMINE   Injectable 25 milliGRAM(s) IV Push every 3 weeks  folic acid 1 milliGRAM(s) Oral daily  levETIRAcetam 500 milliGRAM(s) Oral two times a day  lidocaine/prilocaine Cream 1 Application(s) Topical daily  metoclopramide Injectable 10 milliGRAM(s) IV Push every 6 hours  multivitamin 1 Tablet(s) Oral daily  nystatin Powder 1 Application(s) Topical every 8 hours  ondansetron Injectable 8 milliGRAM(s) IV Push every 8 hours  sodium chloride 0.9%. 1000 milliLiter(s) IV Continuous <Continuous>      PRN:   acetaminophen   Tablet .. 650 milliGRAM(s) Oral every 6 hours PRN  diphenhydrAMINE   Injectable 25 milliGRAM(s) IV Push every 4 hours PRN  docusate sodium 100 milliGRAM(s) Oral three times a day PRN  HYDROmorphone  Injectable 0.5 milliGRAM(s) IV Push every 4 hours PRN  oxyCODONE    IR 5 milliGRAM(s) Oral every 6 hours PRN  senna 1 Tablet(s) Oral at bedtime PRN    A/P:   58 year old F with a history of Ph+ ALL s/p autologous pbSCT in 2016; relapsed, s/p Inotuzumab now here for Haploidentical PBSCT from son  Post Allogeneic PBSCT day + 6   H/o subdural hematomas on [facility]  Baseline CT Head non-con, no acute intracranial abnormality, possible sinusitis   Cough x 3 weeks, CXR clear, continue Tessalon 100 mg PRN, RVP (-)  Chronic back pain- Dilaudid 0.5 mg IVP q 4 hours PRN  Mild transaminitis - resolved     1. Infectious Disease:   acyclovir   Oral Tab/Cap 400 milliGRAM(s) Oral every 8 hours  cefepime   IVPB      cefepime   IVPB 2000 milliGRAM(s) IV Intermittent every 8 hours  clotrimazole Lozenge 1 Lozenge Oral five times a day  fluconAZOLE   Tablet 400 milliGRAM(s) Oral daily    2. VOD Prophylaxis: Actigall, Glutamine, Heparin (dosed at 100 units / kg / day)     3. GI Prophylaxis:    pantoprazole    Tablet 40 milliGRAM(s) Oral before breakfast    4. Mouthcare - NS / NaHCO3 rinses, Mycelex, Caphosol; Skin care     5. GVHD prophylaxis   CTX days + 3, + 4   mycophenolate mofetil 1000 milliGRAM(s) Oral <User Schedule>  tacrolimus 1 milliGRAM(s) Oral two times a day    6. Transfuse & replete electrolytes prn   1 bag platelets     7. IV hydration, daily weights, strict I&O, prn diuresis     8. PO intake as tolerated, nutrition follow up as needed, MVI, folic acid     9. Antiemetics, anti-diarrhea medications:   ondansetron Injectable 8 milliGRAM(s) IV Push every 8 hours  metoclopramide Injectable 10 milliGRAM(s) IV Push every 6 hours    10. OOB as tolerated, physical therapy consult if needed     11. Monitor coags / fibrinogen 2x week, vitamin K as needed     12. Monitor closely for clinical changes, monitor for fevers     13. Emotional support provided, plan of care discussed and questions addressed     14. Patient education done regarding plan of care, restrictions and discharge planning     15. Continue regular social work input     I have written the above note for Dr. Logan who performed service with me in the room.   Janett Barrow NP-C ([phone])    I have seen and examined patient with NP, I agree with above note as scribed.

## 2019-02-19 NOTE — PROGRESS NOTE ADULT - ATTENDING COMMENTS
58 year old female with PMH [place] chromosome positive ALL diagnosed in 2016 s/p R HyperCVAD X 4 cycles, IT MTX X 2, s/p autologous stem cell transplantation (12/16/16),  who presented in October 2018 with subdural hemorrhage and relapsed disease confirmed by peripheral blood flow cytometry treated with Inotuzumab X 2 cycles.  Bone marrow biopsy on 1/23/19 showed remission with FISH and PCR for BCR-ABL translocation negative on the BM specimen but peripheral blood testing on 1/31 showed .004% BCR-ABL transcript with negative FISH suggesting MRD positivity.  Patient was on Ponatinib, DC 1/31/19. LFT's improved off drug.  Now in CR2    Patient was admitted for haploidentical stem cell transplantation with fludarabine/cytoxan/TBI conditioning. She is s/p HPC transplant, day + 6   For high-dose Cytoxan on days +3, +4(GVHD prophylaxis); then begin Tacrolimis, Cellcept on day +5.  Begin Zarxio on day +5    Neutropenic fevers resolved, s/p haplo storm - on Cefepime, culture from 2/14, 2/16 negative.  CXR negative.  On Acyclovir, Fluconazole prophylaxis.  Monitor I/O's/weights- lasix today.  VOD prophylaxis as per protocol.  Encourage nutrition, mouth care, ambulation.    History of SDH, continue Keppra. CT scan baseline 2/7/19 negative for SDH.   History of HTN continue Losartan. 58 year old female with PMH [place] chromosome positive ALL diagnosed in 2016 s/p R HyperCVAD X 4 cycles, IT MTX X 2, s/p autologous stem cell transplantation (12/16/16),  who presented in October 2018 with subdural hemorrhage and relapsed disease confirmed by peripheral blood flow cytometry treated with Inotuzumab X 2 cycles.  Bone marrow biopsy on 1/23/19 showed remission with FISH and PCR for BCR-ABL translocation negative on the BM specimen but peripheral blood testing on 1/31 showed .004% BCR-ABL transcript with negative FISH suggesting MRD positivity.  Patient was on Ponatinib, DC 1/31/19. LFT's improved off drug.  Now in CR2    Patient was admitted for haploidentical stem cell transplantation with fludarabine/cytoxan/TBI conditioning. She is s/p HPC transplant, day + 6   Completed high-dose Cytoxan on days +3, +4(GVHD prophylaxis); then begin Tacrolimis, Cellcept on day +5.  Begin Zarxio on day +5    Neutropenic fevers resolved, s/p haplo storm - on Cefepime, culture from 2/14, 2/16 negative.  CXR negative.  On Acyclovir, Fluconazole prophylaxis.  Monitor I/O's/weights- lasix as needed  VOD prophylaxis as per protocol.  Encourage nutrition, mouth care, ambulation.    History of SDH, continue Keppra. CT scan baseline 2/7/19 negative for SDH.   History of HTN continue Losartan.  OOB

## 2019-02-20 LAB
ALBUMIN SERPL ELPH-MCNC: 3.1 G/DL — LOW (ref 3.3–5)
ALP SERPL-CCNC: 159 U/L — HIGH (ref 40–120)
ALT FLD-CCNC: 24 U/L — SIGNIFICANT CHANGE UP (ref 10–45)
ANION GAP SERPL CALC-SCNC: 9 MMOL/L — SIGNIFICANT CHANGE UP (ref 5–17)
AST SERPL-CCNC: 11 U/L — SIGNIFICANT CHANGE UP (ref 10–40)
BILIRUB SERPL-MCNC: 0.2 MG/DL — SIGNIFICANT CHANGE UP (ref 0.2–1.2)
BLD GP AB SCN SERPL QL: NEGATIVE — SIGNIFICANT CHANGE UP
BUN SERPL-MCNC: 12 MG/DL — SIGNIFICANT CHANGE UP (ref 7–23)
C DIFF BY PCR RESULT: DETECTED
C DIFF GDH STL QL: SIGNIFICANT CHANGE UP
C DIFF GDH STL QL: SIGNIFICANT CHANGE UP
C DIFF TOX GENS STL QL NAA+PROBE: SIGNIFICANT CHANGE UP
CALCIUM SERPL-MCNC: 9.5 MG/DL — SIGNIFICANT CHANGE UP (ref 8.4–10.5)
CHLORIDE SERPL-SCNC: 107 MMOL/L — SIGNIFICANT CHANGE UP (ref 96–108)
CO2 SERPL-SCNC: 26 MMOL/L — SIGNIFICANT CHANGE UP (ref 22–31)
CREAT SERPL-MCNC: 0.59 MG/DL — SIGNIFICANT CHANGE UP (ref 0.5–1.3)
GLUCOSE SERPL-MCNC: 88 MG/DL — SIGNIFICANT CHANGE UP (ref 70–99)
HCT VFR BLD CALC: 28.7 % — LOW (ref 34.5–45)
HGB BLD-MCNC: 9.5 G/DL — LOW (ref 11.5–15.5)
LDH SERPL L TO P-CCNC: 124 U/L — SIGNIFICANT CHANGE UP (ref 50–242)
MAGNESIUM SERPL-MCNC: 2.1 MG/DL — SIGNIFICANT CHANGE UP (ref 1.6–2.6)
MCHC RBC-ENTMCNC: 33.2 GM/DL — SIGNIFICANT CHANGE UP (ref 32–36)
MCHC RBC-ENTMCNC: 34.7 PG — HIGH (ref 27–34)
MCV RBC AUTO: 104 FL — HIGH (ref 80–100)
PHOSPHATE SERPL-MCNC: 2.4 MG/DL — LOW (ref 2.5–4.5)
PLATELET # BLD AUTO: 4 K/UL — CRITICAL LOW (ref 150–400)
POTASSIUM SERPL-MCNC: 3.9 MMOL/L — SIGNIFICANT CHANGE UP (ref 3.5–5.3)
POTASSIUM SERPL-SCNC: 3.9 MMOL/L — SIGNIFICANT CHANGE UP (ref 3.5–5.3)
PROT SERPL-MCNC: 5.5 G/DL — LOW (ref 6–8.3)
RBC # BLD: 2.75 M/UL — LOW (ref 3.8–5.2)
RBC # FLD: 12.6 % — SIGNIFICANT CHANGE UP (ref 10.3–14.5)
RH IG SCN BLD-IMP: POSITIVE — SIGNIFICANT CHANGE UP
SODIUM SERPL-SCNC: 142 MMOL/L — SIGNIFICANT CHANGE UP (ref 135–145)
WBC # BLD: 0.1 K/UL — CRITICAL LOW (ref 3.8–10.5)
WBC # FLD AUTO: 0.1 K/UL — CRITICAL LOW (ref 3.8–10.5)

## 2019-02-20 PROCEDURE — 99291 CRITICAL CARE FIRST HOUR: CPT

## 2019-02-20 RX ORDER — POTASSIUM PHOSPHATE, MONOBASIC POTASSIUM PHOSPHATE, DIBASIC 236; 224 MG/ML; MG/ML
15 INJECTION, SOLUTION INTRAVENOUS ONCE
Qty: 0 | Refills: 0 | Status: COMPLETED | OUTPATIENT
Start: 2019-02-20 | End: 2019-02-20

## 2019-02-20 RX ORDER — METOCLOPRAMIDE HCL 10 MG
10 TABLET ORAL EVERY 6 HOURS
Qty: 0 | Refills: 0 | Status: DISCONTINUED | OUTPATIENT
Start: 2019-02-20 | End: 2019-03-04

## 2019-02-20 RX ORDER — VANCOMYCIN HCL 1 G
125 VIAL (EA) INTRAVENOUS EVERY 6 HOURS
Qty: 0 | Refills: 0 | Status: DISCONTINUED | OUTPATIENT
Start: 2019-02-20 | End: 2019-03-04

## 2019-02-20 RX ORDER — ONDANSETRON 8 MG/1
8 TABLET, FILM COATED ORAL EVERY 8 HOURS
Qty: 0 | Refills: 0 | Status: DISCONTINUED | OUTPATIENT
Start: 2019-02-20 | End: 2019-02-20

## 2019-02-20 RX ORDER — ONDANSETRON 8 MG/1
8 TABLET, FILM COATED ORAL EVERY 8 HOURS
Qty: 0 | Refills: 0 | Status: DISCONTINUED | OUTPATIENT
Start: 2019-02-20 | End: 2019-03-04

## 2019-02-20 RX ADMIN — Medication 5 MILLILITER(S): at 20:12

## 2019-02-20 RX ADMIN — Medication 5 MILLILITER(S): at 01:17

## 2019-02-20 RX ADMIN — LEVETIRACETAM 500 MILLIGRAM(S): 250 TABLET, FILM COATED ORAL at 18:25

## 2019-02-20 RX ADMIN — Medication 10 MILLILITER(S): at 01:17

## 2019-02-20 RX ADMIN — Medication 480 MICROGRAM(S): at 18:24

## 2019-02-20 RX ADMIN — URSODIOL 300 MILLIGRAM(S): 250 TABLET, FILM COATED ORAL at 18:26

## 2019-02-20 RX ADMIN — MYCOPHENOLATE MOFETIL 1000 MILLIGRAM(S): 250 CAPSULE ORAL at 14:23

## 2019-02-20 RX ADMIN — LOSARTAN POTASSIUM 100 MILLIGRAM(S): 100 TABLET, FILM COATED ORAL at 05:35

## 2019-02-20 RX ADMIN — Medication 5 MILLILITER(S): at 16:28

## 2019-02-20 RX ADMIN — Medication 400 MILLIGRAM(S): at 05:35

## 2019-02-20 RX ADMIN — NYSTATIN CREAM 1 APPLICATION(S): 100000 CREAM TOPICAL at 14:26

## 2019-02-20 RX ADMIN — MYCOPHENOLATE MOFETIL 1000 MILLIGRAM(S): 250 CAPSULE ORAL at 05:35

## 2019-02-20 RX ADMIN — Medication 10 MILLIGRAM(S): at 05:35

## 2019-02-20 RX ADMIN — Medication 100 MILLIGRAM(S): at 14:23

## 2019-02-20 RX ADMIN — Medication 10 MILLILITER(S): at 16:27

## 2019-02-20 RX ADMIN — Medication 400 MILLIGRAM(S): at 21:30

## 2019-02-20 RX ADMIN — LEVETIRACETAM 500 MILLIGRAM(S): 250 TABLET, FILM COATED ORAL at 05:35

## 2019-02-20 RX ADMIN — HYDROMORPHONE HYDROCHLORIDE 0.5 MILLIGRAM(S): 2 INJECTION INTRAMUSCULAR; INTRAVENOUS; SUBCUTANEOUS at 06:08

## 2019-02-20 RX ADMIN — TACROLIMUS 1 MILLIGRAM(S): 5 CAPSULE ORAL at 05:35

## 2019-02-20 RX ADMIN — HYDROMORPHONE HYDROCHLORIDE 0.5 MILLIGRAM(S): 2 INJECTION INTRAMUSCULAR; INTRAVENOUS; SUBCUTANEOUS at 22:49

## 2019-02-20 RX ADMIN — HYDROMORPHONE HYDROCHLORIDE 0.5 MILLIGRAM(S): 2 INJECTION INTRAMUSCULAR; INTRAVENOUS; SUBCUTANEOUS at 13:00

## 2019-02-20 RX ADMIN — Medication 1 MILLIGRAM(S): at 12:25

## 2019-02-20 RX ADMIN — Medication 1 LOZENGE: at 08:53

## 2019-02-20 RX ADMIN — Medication 5 MILLILITER(S): at 08:52

## 2019-02-20 RX ADMIN — TACROLIMUS 1 MILLIGRAM(S): 5 CAPSULE ORAL at 18:26

## 2019-02-20 RX ADMIN — Medication 1 TABLET(S): at 12:25

## 2019-02-20 RX ADMIN — Medication 1 LOZENGE: at 16:27

## 2019-02-20 RX ADMIN — Medication 125 MILLIGRAM(S): at 18:27

## 2019-02-20 RX ADMIN — ONDANSETRON 8 MILLIGRAM(S): 8 TABLET, FILM COATED ORAL at 21:30

## 2019-02-20 RX ADMIN — POTASSIUM PHOSPHATE, MONOBASIC POTASSIUM PHOSPHATE, DIBASIC 62.5 MILLIMOLE(S): 236; 224 INJECTION, SOLUTION INTRAVENOUS at 11:32

## 2019-02-20 RX ADMIN — HYDROMORPHONE HYDROCHLORIDE 0.5 MILLIGRAM(S): 2 INJECTION INTRAMUSCULAR; INTRAVENOUS; SUBCUTANEOUS at 18:45

## 2019-02-20 RX ADMIN — Medication 10 MILLILITER(S): at 20:12

## 2019-02-20 RX ADMIN — CEFEPIME 100 MILLIGRAM(S): 1 INJECTION, POWDER, FOR SOLUTION INTRAMUSCULAR; INTRAVENOUS at 05:34

## 2019-02-20 RX ADMIN — Medication 1 LOZENGE: at 01:17

## 2019-02-20 RX ADMIN — HYDROMORPHONE HYDROCHLORIDE 0.5 MILLIGRAM(S): 2 INJECTION INTRAMUSCULAR; INTRAVENOUS; SUBCUTANEOUS at 18:15

## 2019-02-20 RX ADMIN — Medication 1 LOZENGE: at 20:12

## 2019-02-20 RX ADMIN — Medication 10 MILLIGRAM(S): at 00:17

## 2019-02-20 RX ADMIN — PANTOPRAZOLE SODIUM 40 MILLIGRAM(S): 20 TABLET, DELAYED RELEASE ORAL at 05:34

## 2019-02-20 RX ADMIN — HYDROMORPHONE HYDROCHLORIDE 0.5 MILLIGRAM(S): 2 INJECTION INTRAMUSCULAR; INTRAVENOUS; SUBCUTANEOUS at 06:30

## 2019-02-20 RX ADMIN — NYSTATIN CREAM 1 APPLICATION(S): 100000 CREAM TOPICAL at 05:35

## 2019-02-20 RX ADMIN — Medication 100 MILLIGRAM(S): at 21:31

## 2019-02-20 RX ADMIN — Medication 100 MILLIGRAM(S): at 05:35

## 2019-02-20 RX ADMIN — HYDROMORPHONE HYDROCHLORIDE 0.5 MILLIGRAM(S): 2 INJECTION INTRAMUSCULAR; INTRAVENOUS; SUBCUTANEOUS at 23:19

## 2019-02-20 RX ADMIN — NYSTATIN CREAM 1 APPLICATION(S): 100000 CREAM TOPICAL at 21:29

## 2019-02-20 RX ADMIN — FLUCONAZOLE 400 MILLIGRAM(S): 150 TABLET ORAL at 12:25

## 2019-02-20 RX ADMIN — Medication 10 MILLILITER(S): at 08:53

## 2019-02-20 RX ADMIN — CEFEPIME 100 MILLIGRAM(S): 1 INJECTION, POWDER, FOR SOLUTION INTRAMUSCULAR; INTRAVENOUS at 14:23

## 2019-02-20 RX ADMIN — Medication 10 MILLILITER(S): at 12:25

## 2019-02-20 RX ADMIN — HYDROMORPHONE HYDROCHLORIDE 0.5 MILLIGRAM(S): 2 INJECTION INTRAMUSCULAR; INTRAVENOUS; SUBCUTANEOUS at 12:30

## 2019-02-20 RX ADMIN — MYCOPHENOLATE MOFETIL 1000 MILLIGRAM(S): 250 CAPSULE ORAL at 21:29

## 2019-02-20 RX ADMIN — ONDANSETRON 8 MILLIGRAM(S): 8 TABLET, FILM COATED ORAL at 14:25

## 2019-02-20 RX ADMIN — URSODIOL 300 MILLIGRAM(S): 250 TABLET, FILM COATED ORAL at 08:50

## 2019-02-20 RX ADMIN — Medication 1 LOZENGE: at 12:25

## 2019-02-20 RX ADMIN — Medication 400 MILLIGRAM(S): at 14:23

## 2019-02-20 RX ADMIN — Medication 5 MILLILITER(S): at 12:25

## 2019-02-20 RX ADMIN — CEFEPIME 100 MILLIGRAM(S): 1 INJECTION, POWDER, FOR SOLUTION INTRAMUSCULAR; INTRAVENOUS at 21:29

## 2019-02-20 NOTE — PROGRESS NOTE ADULT - ATTENDING COMMENTS
58 year old female with PMH [place] chromosome positive ALL diagnosed in 2016 s/p R HyperCVAD X 4 cycles, IT MTX X 2, s/p autologous stem cell transplantation (12/16/16),  who presented in October 2018 with subdural hemorrhage and relapsed disease confirmed by peripheral blood flow cytometry treated with Inotuzumab X 2 cycles.  Bone marrow biopsy on 1/23/19 showed remission with FISH and PCR for BCR-ABL translocation negative on the BM specimen but peripheral blood testing on 1/31 showed .004% BCR-ABL transcript with negative FISH suggesting MRD positivity.  Patient was on Ponatinib, DC 1/31/19. LFT's improved off drug.  Now in CR2    Patient was admitted for haploidentical stem cell transplantation with fludarabine/cytoxan/TBI conditioning. She is s/p HPC transplant, day + 7  Completed high-dose Cytoxan on days +3, +4(GVHD prophylaxis); then begin Tacrolimis, Cellcept on day +5.  Begin Zarxio on day +5    Neutropenic fevers resolved, s/p haplo storm - on Cefepime, culture from 2/14, 2/16 negative.  CXR negative.  On Acyclovir, Fluconazole prophylaxis.  Monitor I/O's/weights- lasix as needed  VOD prophylaxis as per protocol.  Encourage nutrition, mouth care, ambulation.    History of SDH, continue Keppra. CT scan baseline 2/7/19 negative for SDH.   History of HTN continue Losartan.  OOB 58 year old female with PMH [place] chromosome positive ALL diagnosed in 2016 s/p R HyperCVAD X 4 cycles, IT MTX X 2, s/p autologous stem cell transplantation (12/16/16),  who presented in October 2018 with subdural hemorrhage and relapsed disease confirmed by peripheral blood flow cytometry treated with Inotuzumab X 2 cycles.  Bone marrow biopsy on 1/23/19 showed remission with FISH and PCR for BCR-ABL translocation negative on the BM specimen but peripheral blood testing on 1/31 showed .004% BCR-ABL transcript with negative FISH suggesting MRD positivity.  Patient was on Ponatinib, DC 1/31/19. LFT's improved off drug.  Now in CR2    Patient was admitted for haploidentical stem cell transplantation with fludarabine/cytoxan/TBI conditioning. She is s/p HPC transplant, day + 7  Completed high-dose Cytoxan on days +3, +4(GVHD prophylaxis); then begin Tacrolimis, Cellcept on day +5.  Begin Zarxio on day +5    Neutropenic fevers resolved, s/p haplo storm - on Cefepime, culture from 2/14, 2/16 negative.  CXR negative.  On Acyclovir, Fluconazole prophylaxis.  Monitor I/O's/weights- lasix as needed  VOD prophylaxis as per protocol.  Encourage nutrition, mouth care, ambulation.    History of SDH, continue Keppra. CT scan baseline 2/7/19 negative for SDH.   History of HTN continue Losartan.  OOB/ambulate

## 2019-02-20 NOTE — PROGRESS NOTE ADULT - SUBJECTIVE AND OBJECTIVE BOX
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    S: Patient seen and examined with HPC Transplant Team:     Denies mouth / tongue / throat pain, dyspnea, cough, nausea, vomiting, diarrhea, abdominal pain     O: Vitals:   Vital Signs Last 24 Hrs  T(C): 36.1 (2019 05:50), Max: 36.3 (2019 12:15)  T(F): 97 (2019 05:50), Max: 97.4 (2019 17:12)  HR: 72 (2019 05:50) (69 - 80)  BP: 131/87 (2019 05:50) (120/71 - 136/77)  BP(mean): --  RR: 20 (2019 05:50) (18 - 20)  SpO2: 100% (2019 05:50) (98% - 100%)    Admit weight: 111.4 kg   Daily Weight in k.2 (2019 08:44)  Today's weight:     Intake / Output:   - @ 07:01  -  02-20 @ 07:00  --------------------------------------------------------  IN: 1985 mL / OUT: 2175 mL / NET: -190 mL      PE:   Oropharynx: no erythema or ulcers  Oral Mucositis: n/a                                                       stGstrstastdstest:st st1st CVS: S1S2, RRR  Lungs: CTA throughout bilaterally   Abdomen: soft, NT/ND, normoactive BS x 4Q  Extremities: no edema  Gastric Mucositis: n/a                                                 stGstrstastdstest:st st1st Intestinal Mucositis: n/a                                             stGstrstastdstest:st st1st Skin: no rash  TLC: C/D/I  Neuro: A&O x 3   Pain: chronic back pain     Labs:   CBC Full  -  ( 2019 06:51 )  WBC Count : 0.1 K/uL  Hemoglobin : 9.5 g/dL  Hematocrit : 28.7 %  Platelet Count - Automated : 4 K/uL  Mean Cell Volume : 104.0 fl  Mean Cell Hemoglobin : 34.7 pg  Mean Cell Hemoglobin Concentration : 33.2 gm/dL  Auto Neutrophil # : x  Auto Lymphocyte # : x  Auto Monocyte # : x  Auto Eosinophil # : x  Auto Basophil # : x  Auto Neutrophil % : x  Auto Lymphocyte % : x  Auto Monocyte % : x  Auto Eosinophil % : x  Auto Basophil % : x                          9.5    0.1   )-----------( 4        ( 2019 06:51 )             28.7         142  |  107  |  12  ----------------------------<  88  3.9   |  26  |  0.59    Ca    9.5      2019 06:51  Phos  2.4       Mg     2.1         TPro  5.5<L>  /  Alb  3.1<L>  /  TBili  0.2  /  DBili  x   /  AST  11  /  ALT  24  /  AlkPhos  159<H>        LIVER FUNCTIONS - ( 2019 06:51 )  Alb: 3.1 g/dL / Pro: 5.5 g/dL / ALK PHOS: 159 U/L / ALT: 24 U/L / AST: 11 U/L / GGT: x           Lactate Dehydrogenase, Serum: 124 U/L ( @ 06:51)    Cultures:   Culture Results: urine  <10,000 CFU/ml Normal Urogenital everton present (19 @ 18:41)    Culture Results: blood   No growth to date. (19 @ 18:30)    Culture Results: blood   No growth to date. (19 @ 18:30)    Radiology:   EXAM:  XR CHEST PORTABLE URGENT 1V                        PROCEDURE DATE:  2019    IMPRESSION:  1.  The lungs are clear.    EXAM:  CT BRAIN                        PROCEDURE DATE:  2019    IMPRESSION:  No acute intracranial abnormality is noted. No subdural collections are   visualized. If the patient has new and persistent symptoms, consider   follow-up brain MRI with and without contrast, if there are no MRI or   contrast contraindications.  New moderate mucosal thickening involves the paranasal sinuses. Correlate   for possible sinusitis      Meds:   Antimicrobials:   acyclovir   Oral Tab/Cap 400 milliGRAM(s) Oral every 8 hours  cefepime   IVPB      cefepime   IVPB 2000 milliGRAM(s) IV Intermittent every 8 hours  clotrimazole Lozenge 1 Lozenge Oral five times a day  fluconAZOLE   Tablet 400 milliGRAM(s) Oral daily      Heme / Onc:   heparin  Infusion 464 Unit(s)/Hr IV Continuous <Continuous>      GI:  docusate sodium 100 milliGRAM(s) Oral three times a day PRN  pantoprazole    Tablet 40 milliGRAM(s) Oral before breakfast  polyethylene glycol 3350 17 Gram(s) Oral daily  senna 1 Tablet(s) Oral at bedtime PRN  sodium bicarbonate Mouth Rinse 10 milliLiter(s) Swish and Spit five times a day  ursodiol Capsule 300 milliGRAM(s) Oral two times a day with meals      Cardiovascular:   losartan 100 milliGRAM(s) Oral daily      Immunologic:   filgrastim-sndz Injectable 480 MICROGram(s) SubCutaneous daily  immune   globulin 10% (GAMMAGARD) IVPB 20 Gram(s) IV Intermittent <User Schedule>  mycophenolate mofetil 1000 milliGRAM(s) Oral <User Schedule>  tacrolimus 1 milliGRAM(s) Oral two times a day      Other medications:   acetaminophen   Tablet .. 650 milliGRAM(s) Oral every 6 hours  acetaminophen   Tablet .. 650 milliGRAM(s) Oral <User Schedule>  benzonatate 100 milliGRAM(s) Oral every 8 hours  Biotene Dry Mouth Oral Rinse 5 milliLiter(s) Swish and Spit five times a day  diphenhydrAMINE   Injectable 25 milliGRAM(s) IV Push every 3 weeks  folic acid 1 milliGRAM(s) Oral daily  levETIRAcetam 500 milliGRAM(s) Oral two times a day  lidocaine/prilocaine Cream 1 Application(s) Topical daily  multivitamin 1 Tablet(s) Oral daily  nystatin Powder 1 Application(s) Topical every 8 hours  ondansetron Injectable 8 milliGRAM(s) IV Push every 8 hours  potassium phosphate IVPB 15 milliMole(s) IV Intermittent once  sodium chloride 0.9%. 1000 milliLiter(s) IV Continuous <Continuous>      PRN:   acetaminophen   Tablet .. 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 Infectious Disease:   acyclovir   Oral Tab/Cap 400 milliGRAM(s) Oral every 8 hours  cefepime   IVPB      cefepime   IVPB 2000 milliGRAM(s) IV Intermittent every 8 hours  clotrimazole Lozenge 1 Lozenge Oral five times a day  fluconAZOLE   Tablet 400 milliGRAM(s) Oral carolyne    2. VOD Prophylaxis: Actigall, Glutamine, Heparin (dosed at 100 units / kg / day)     3. GI Prophylaxis:    pantoprazole    Tablet 40 milliGRAM(s) Oral before breakfast    4. Mouthcare - NS / NaHCO3 rinses, Mycelex, Caphosol; Skin care     5. GVHD prophylaxis   mycophenolate mofetil 1000 milliGRAM(s) Oral <User Schedule>  tacrolimus 1 milliGRAM(s) Oral two times a day    6. Transfuse & replete electrolytes prn   potassium phosphate IVPB 15 milliMole(s) IV Intermittent once    7. IV hydration, daily weights, strict I&O, prn diuresis     8. PO intake as tolerated, nutrition follow up as needed, MVI, folic acid     9. Antiemetics, anti-diarrhea medications:   metoclopramide Injectable 10 milliGRAM(s) IV Push every 6 hours PRN  ondansetron Injectable 8 milliGRAM(s) IV Push every 8 hours    10. OOB as tolerated, physical therapy consult if needed     11. Monitor coags / fibrinogen 2x week, vitamin K as needed     12. Monitor closely for clinical changes, monitor for fevers     13. Emotional support provided, plan of care discussed and questions addressed     14. Patient education done regarding plan of care, restrictions and discharge planning     15. Continue regular social work input     I have written the above note for Dr. Logan who performed service with me in the room.   Janett Barrow NP-C ([phone])    I have seen and examined patient with NP, I agree with above note as scribed. 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    S: Patient seen and examined with HPC Transplant Team:   + fatigue   + diarrhea last night   + chronic cough   Denies mouth / tongue / throat pain, dyspnea, cough, nausea, vomiting, abdominal pain     O: Vitals:   Vital Signs Last 24 Hrs  T(C): 36.1 (2019 05:50), Max: 36.3 (2019 12:15)  T(F): 97 (2019 05:50), Max: 97.4 (2019 17:12)  HR: 72 (2019 05:50) (69 - 80)  BP: 131/87 (2019 05:50) (120/71 - 136/77)  BP(mean): --  RR: 20 (2019 05:50) (18 - 20)  SpO2: 100% (2019 05:50) (98% - 100%)    Admit weight: 111.4 kg   Daily Weight in k.2 (2019 08:44)  Today's weight: 111.1kg     Intake / Output:    @ 07:01  -   @ 07:00  --------------------------------------------------------  IN: 1985 mL / OUT: 2175 mL / NET: -190 mL      PE:   Oropharynx: no erythema or ulcers  Oral Mucositis: n/a                                                       stGstrstastdstest:st st1st CVS: S1S2, RRR  Lungs: CTA throughout bilaterally   Abdomen: soft, NT/ND, normoactive BS x 4Q  Extremities: no edema  Gastric Mucositis: n/a                                                 stGstrstastdstest:st st1st Intestinal Mucositis: n/a                                             stGstrstastdstest:st st1st Skin: no rash  TLC: C/D/I  Neuro: A&O x 3   Pain: chronic back pain     Labs:   CBC Full  -  ( 2019 06:51 )  WBC Count : 0.1 K/uL  Hemoglobin : 9.5 g/dL  Hematocrit : 28.7 %  Platelet Count - Automated : 4 K/uL  Mean Cell Volume : 104.0 fl  Mean Cell Hemoglobin : 34.7 pg  Mean Cell Hemoglobin Concentration : 33.2 gm/dL  Auto Neutrophil # : x  Auto Lymphocyte # : x  Auto Monocyte # : x  Auto Eosinophil # : x  Auto Basophil # : x  Auto Neutrophil % : x  Auto Lymphocyte % : x  Auto Monocyte % : x  Auto Eosinophil % : x  Auto Basophil % : x                          9.5    0.1   )-----------( 4        ( 2019 06:51 )             28.7         142  |  107  |  12  ----------------------------<  88  3.9   |  26  |  0.59    Ca    9.5      2019 06:51  Phos  2.4       Mg     2.1         TPro  5.5<L>  /  Alb  3.1<L>  /  TBili  0.2  /  DBili  x   /  AST  11  /  ALT  24  /  AlkPhos  159<H>        LIVER FUNCTIONS - ( 2019 06:51 )  Alb: 3.1 g/dL / Pro: 5.5 g/dL / ALK PHOS: 159 U/L / ALT: 24 U/L / AST: 11 U/L / GGT: x           Lactate Dehydrogenase, Serum: 124 U/L ( @ 06:51)    Cultures:   Culture Results: urine  <10,000 CFU/ml Normal Urogenital everton present (19 @ 18:41)    Culture Results: blood   No growth to date. (19 @ 18:30)    Culture Results: blood   No growth to date. (19 @ 18:30)    Radiology:   EXAM:  XR CHEST PORTABLE URGENT 1V                        PROCEDURE DATE:  2019    IMPRESSION:  1.  The lungs are clear.    EXAM:  CT BRAIN                        PROCEDURE DATE:  2019    IMPRESSION:  No acute intracranial abnormality is noted. No subdural collections are   visualized. If the patient has new and persistent symptoms, consider   follow-up brain MRI with and without contrast, if there are no MRI or   contrast contraindications.  New moderate mucosal thickening involves the paranasal sinuses. Correlate   for possible sinusitis      Meds:   Antimicrobials:   acyclovir   Oral Tab/Cap 400 milliGRAM(s) Oral every 8 hours  cefepime   IVPB      cefepime   IVPB 2000 milliGRAM(s) IV Intermittent every 8 hours  clotrimazole Lozenge 1 Lozenge Oral five times a day  fluconAZOLE   Tablet 400 milliGRAM(s) Oral daily      Heme / Onc:   heparin  Infusion 464 Unit(s)/Hr IV Continuous <Continuous>      GI:  docusate sodium 100 milliGRAM(s) Oral three times a day PRN  pantoprazole    Tablet 40 milliGRAM(s) Oral before breakfast  polyethylene glycol 3350 17 Gram(s) Oral daily  senna 1 Tablet(s) Oral at bedtime PRN  sodium bicarbonate Mouth Rinse 10 milliLiter(s) Swish and Spit five times a day  ursodiol Capsule 300 milliGRAM(s) Oral two times a day with meals      Cardiovascular:   losartan 100 milliGRAM(s) Oral daily      Immunologic:   filgrastim-sndz Injectable 480 MICROGram(s) SubCutaneous daily  immune   globulin 10% (GAMMAGARD) IVPB 20 Gram(s) IV Intermittent <User Schedule>  mycophenolate mofetil 1000 milliGRAM(s) Oral <User Schedule>  tacrolimus 1 milliGRAM(s) Oral two times a day      Other medications:   acetaminophen   Tablet .. 650 milliGRAM(s) Oral every 6 hours  acetaminophen   Tablet .. 650 milliGRAM(s) Oral <User Schedule>  benzonatate 100 milliGRAM(s) Oral every 8 hours  Biotene Dry Mouth Oral Rinse 5 milliLiter(s) Swish and Spit five times a day  diphenhydrAMINE   Injectable 25 milliGRAM(s) IV Push every 3 weeks  folic acid 1 milliGRAM(s) Oral daily  levETIRAcetam 500 milliGRAM(s) Oral two times a day  lidocaine/prilocaine Cream 1 Application(s) Topical daily  multivitamin 1 Tablet(s) Oral daily  nystatin Powder 1 Application(s) Topical every 8 hours  ondansetron Injectable 8 milliGRAM(s) IV Push every 8 hours  potassium phosphate IVPB 15 milliMole(s) IV Intermittent once  sodium chloride 0.9%. 1000 milliLiter(s) IV Continuous <Continuous>      PRN:   acetaminophen   Tablet .. 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 Infectious Disease:   acyclovir   Oral Tab/Cap 400 milliGRAM(s) Oral every 8 hours  cefepime   IVPB      cefepime   IVPB 2000 milliGRAM(s) IV Intermittent every 8 hours  clotrimazole Lozenge 1 Lozenge Oral five times a day  fluconAZOLE   Tablet 400 milliGRAM(s) Oral carolyne    2. VOD Prophylaxis: Actigall, Glutamine, Heparin (dosed at 100 units / kg / day)     3. GI Prophylaxis:    pantoprazole    Tablet 40 milliGRAM(s) Oral before breakfast    4. Mouthcare - NS / NaHCO3 rinses, Mycelex, Caphosol; Skin care     5. GVHD prophylaxis   mycophenolate mofetil 1000 milliGRAM(s) Oral <User Schedule>  tacrolimus 1 milliGRAM(s) Oral two times a day    6. Transfuse & replete electrolytes prn   potassium phosphate IVPB 15 milliMole(s) IV Intermittent once    7. IV hydration, daily weights, strict I&O, prn diuresis     8. PO intake as tolerated, nutrition follow up as needed, MVI, folic acid     9. Antiemetics, anti-diarrhea medications:   metoclopramide Injectable 10 milliGRAM(s) IV Push every 6 hours PRN  ondansetron Injectable 8 milliGRAM(s) IV Push every 8 hours    10. OOB as tolerated, physical therapy consult if needed     11. Monitor coags / fibrinogen 2x week, vitamin K as needed     12. Monitor closely for clinical changes, monitor for fevers     13. Emotional support provided, plan of care discussed and questions addressed     14. Patient education done regarding plan of care, restrictions and discharge planning     15. Continue regular social work input     I have written the above note for Dr. Logan who performed service with me in the room.   Janett Barrow NP-C ([phone])    I have seen and examined patient with NP, I agree with above note as scribed. 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    S: Patient seen and examined with HPC Transplant Team:   + fatigue   + diarrhea last night   + chronic cough - improved   Denies mouth / tongue / throat pain, dyspnea, nausea, vomiting, abdominal pain     O: Vitals:   Vital Signs Last 24 Hrs  T(C): 36.1 (2019 05:50), Max: 36.3 (2019 12:15)  T(F): 97 (2019 05:50), Max: 97.4 (2019 17:12)  HR: 72 (2019 05:50) (69 - 80)  BP: 131/87 (2019 05:50) (120/71 - 136/77)  BP(mean): --  RR: 20 (2019 05:50) (18 - 20)  SpO2: 100% (2019 05:50) (98% - 100%)    Admit weight: 111.4 kg   Daily Weight in k.2 (2019 08:44)  Today's weight: 111.1kg     Intake / Output:    @ 07:01  -   @ 07:00  --------------------------------------------------------  IN: 1985 mL / OUT: 2175 mL / NET: -190 mL      PE:   Oropharynx: no erythema or ulcers  Oral Mucositis: n/a                                                       stGstrstastdstest:st st1st CVS: S1S2, RRR  Lungs: CTA throughout bilaterally   Abdomen: soft, NT/ND, normoactive BS x 4Q  Extremities: no edema  Gastric Mucositis: n/a                                                 stGstrstastdstest:st st1st Intestinal Mucositis: n/a                                             stGstrstastdstest:st st1st Skin: no rash  TLC: C/D/I  Neuro: A&O x 3   Pain: chronic back pain     Labs:   CBC Full  -  ( 2019 06:51 )  WBC Count : 0.1 K/uL  Hemoglobin : 9.5 g/dL  Hematocrit : 28.7 %  Platelet Count - Automated : 4 K/uL  Mean Cell Volume : 104.0 fl  Mean Cell Hemoglobin : 34.7 pg  Mean Cell Hemoglobin Concentration : 33.2 gm/dL  Auto Neutrophil # : x  Auto Lymphocyte # : x  Auto Monocyte # : x  Auto Eosinophil # : x  Auto Basophil # : x  Auto Neutrophil % : x  Auto Lymphocyte % : x  Auto Monocyte % : x  Auto Eosinophil % : x  Auto Basophil % : x                          9.5    0.1   )-----------( 4        ( 2019 06:51 )             28.7         142  |  107  |  12  ----------------------------<  88  3.9   |  26  |  0.59    Ca    9.5      2019 06:51  Phos  2.4       Mg     2.1         TPro  5.5<L>  /  Alb  3.1<L>  /  TBili  0.2  /  DBili  x   /  AST  11  /  ALT  24  /  AlkPhos  159<H>        LIVER FUNCTIONS - ( 2019 06:51 )  Alb: 3.1 g/dL / Pro: 5.5 g/dL / ALK PHOS: 159 U/L / ALT: 24 U/L / AST: 11 U/L / GGT: x           Lactate Dehydrogenase, Serum: 124 U/L ( @ 06:51)    Cultures:   Culture Results: urine  <10,000 CFU/ml Normal Urogenital everton present (19 @ 18:41)    Culture Results: blood   No growth to date. (19 @ 18:30)    Culture Results: blood   No growth to date. (19 @ 18:30)    Radiology:   EXAM:  XR CHEST PORTABLE URGENT 1V                        PROCEDURE DATE:  2019    IMPRESSION:  1.  The lungs are clear.    EXAM:  CT BRAIN                        PROCEDURE DATE:  2019    IMPRESSION:  No acute intracranial abnormality is noted. No subdural collections are   visualized. If the patient has new and persistent symptoms, consider   follow-up brain MRI with and without contrast, if there are no MRI or   contrast contraindications.  New moderate mucosal thickening involves the paranasal sinuses. Correlate   for possible sinusitis      Meds:   Antimicrobials:   acyclovir   Oral Tab/Cap 400 milliGRAM(s) Oral every 8 hours  cefepime   IVPB      cefepime   IVPB 2000 milliGRAM(s) IV Intermittent every 8 hours  clotrimazole Lozenge 1 Lozenge Oral five times a day  fluconAZOLE   Tablet 400 milliGRAM(s) Oral daily      Heme / Onc:   heparin  Infusion 464 Unit(s)/Hr IV Continuous <Continuous>      GI:  docusate sodium 100 milliGRAM(s) Oral three times a day PRN  pantoprazole    Tablet 40 milliGRAM(s) Oral before breakfast  polyethylene glycol 3350 17 Gram(s) Oral daily  senna 1 Tablet(s) Oral at bedtime PRN  sodium bicarbonate Mouth Rinse 10 milliLiter(s) Swish and Spit five times a day  ursodiol Capsule 300 milliGRAM(s) Oral two times a day with meals      Cardiovascular:   losartan 100 milliGRAM(s) Oral daily      Immunologic:   filgrastim-sndz Injectable 480 MICROGram(s) SubCutaneous daily  immune   globulin 10% (GAMMAGARD) IVPB 20 Gram(s) IV Intermittent <User Schedule>  mycophenolate mofetil 1000 milliGRAM(s) Oral <User Schedule>  tacrolimus 1 milliGRAM(s) Oral two times a day      Other medications:   acetaminophen   Tablet .. 650 milliGRAM(s) Oral every 6 hours  acetaminophen   Tablet .. 650 milliGRAM(s) Oral <User Schedule>  benzonatate 100 milliGRAM(s) Oral every 8 hours  Biotene Dry Mouth Oral Rinse 5 milliLiter(s) Swish and Spit five times a day  diphenhydrAMINE   Injectable 25 milliGRAM(s) IV Push every 3 weeks  folic acid 1 milliGRAM(s) Oral daily  levETIRAcetam 500 milliGRAM(s) Oral two times a day  lidocaine/prilocaine Cream 1 Application(s) Topical daily  multivitamin 1 Tablet(s) Oral daily  nystatin Powder 1 Application(s) Topical every 8 hours  ondansetron Injectable 8 milliGRAM(s) IV Push every 8 hours  potassium phosphate IVPB 15 milliMole(s) IV Intermittent once  sodium chloride 0.9%. 1000 milliLiter(s) IV Continuous <Continuous>      PRN:   acetaminophen   Tablet .. 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 Infectious Disease:   acyclovir   Oral Tab/Cap 400 milliGRAM(s) Oral every 8 hours  cefepime   IVPB      cefepime   IVPB 2000 milliGRAM(s) IV Intermittent every 8 hours  clotrimazole Lozenge 1 Lozenge Oral five times a day  fluconAZOLE   Tablet 400 milliGRAM(s) Oral carolyne    2. VOD Prophylaxis: Actigall, Glutamine, Heparin (dosed at 100 units / kg / day)     3. GI Prophylaxis:    pantoprazole    Tablet 40 milliGRAM(s) Oral before breakfast    4. Mouthcare - NS / NaHCO3 rinses, Mycelex, Caphosol; Skin care     5. GVHD prophylaxis   mycophenolate mofetil 1000 milliGRAM(s) Oral <User Schedule>  tacrolimus 1 milliGRAM(s) Oral two times a day    6. Transfuse & replete electrolytes prn   potassium phosphate IVPB 15 milliMole(s) IV Intermittent once    7. IV hydration, daily weights, strict I&O, prn diuresis     8. PO intake as tolerated, nutrition follow up as needed, MVI, folic acid     9. Antiemetics, anti-diarrhea medications:   metoclopramide Injectable 10 milliGRAM(s) IV Push every 6 hours PRN  ondansetron Injectable 8 milliGRAM(s) IV Push every 8 hours    10. OOB as tolerated, physical therapy consult if needed     11. Monitor coags / fibrinogen 2x week, vitamin K as needed     12. Monitor closely for clinical changes, monitor for fevers     13. Emotional support provided, plan of care discussed and questions addressed     14. Patient education done regarding plan of care, restrictions and discharge planning     15. Continue regular social work input     I have written the above note for Dr. Logan who performed service with me in the room.   Janett Barrow NP-C ([phone])    I have seen and examined patient with NP, I agree with above note as scribed.

## 2019-02-20 NOTE — CHART NOTE - NSCHARTNOTEFT_GEN_A_CORE
Pt seen for nutrition follow up. 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     Source: Patient [x ]    Family [ ]     other [ ]    Diet : Regular diet with glutasolve 1 packet daily       Patient denies N+V, stated this is well controlled by anti-nausea medication around the clock, pt reports more frequent, soft stool ~4 times daily      PO intake: Pt reports last week appetite was decreased due to fevers, pt stated she continued to make an effort to eat, stated breakfast meal is her best and will eat 100%, lunch and dinner closer to 50%. Pt enjoys milkshakes supplied by [employer] and looks forward to milkshake today. Pt not agreeable to ensure enlive however agreeable to carnation instant breakfast, pt to order these as needed.       Current Weight: 245.5 lbs (2/7), 248.8 lbs (2/14), 245.1 lbs (2/19), weight stable, slight uptrend likely related to fluid shifts, weight stable with admission.   % Weight Change    Pertinent Medications: MEDICATIONS  (STANDING):  acetaminophen   Tablet .. 650 milliGRAM(s) Oral every 6 hours  acetaminophen   Tablet .. 650 milliGRAM(s) Oral <User Schedule>  acyclovir   Oral Tab/Cap 400 milliGRAM(s) Oral every 8 hours  benzonatate 100 milliGRAM(s) Oral every 8 hours  Biotene Dry Mouth Oral Rinse 5 milliLiter(s) Swish and Spit five times a day  cefepime   IVPB      cefepime   IVPB 2000 milliGRAM(s) IV Intermittent every 8 hours  clotrimazole Lozenge 1 Lozenge Oral five times a day  diphenhydrAMINE   Injectable 25 milliGRAM(s) IV Push every 3 weeks  filgrastim-sndz Injectable 480 MICROGram(s) SubCutaneous daily  fluconAZOLE   Tablet 400 milliGRAM(s) Oral daily  folic acid 1 milliGRAM(s) Oral daily  heparin  Infusion 464 Unit(s)/Hr (4.64 mL/Hr) IV Continuous <Continuous>  immune   globulin 10% (GAMMAGARD) IVPB 20 Gram(s) IV Intermittent <User Schedule>  levETIRAcetam 500 milliGRAM(s) Oral two times a day  lidocaine/prilocaine Cream 1 Application(s) Topical daily  losartan 100 milliGRAM(s) Oral daily  multivitamin 1 Tablet(s) Oral daily  mycophenolate mofetil 1000 milliGRAM(s) Oral <User Schedule>  nystatin Powder 1 Application(s) Topical every 8 hours  ondansetron Injectable 8 milliGRAM(s) IV Push every 8 hours  pantoprazole    Tablet 40 milliGRAM(s) Oral before breakfast  polyethylene glycol 3350 17 Gram(s) Oral daily  potassium phosphate IVPB 15 milliMole(s) IV Intermittent once  sodium bicarbonate Mouth Rinse 10 milliLiter(s) Swish and Spit five times a day  sodium chloride 0.9%. 1000 milliLiter(s) (20 mL/Hr) IV Continuous <Continuous>  tacrolimus 1 milliGRAM(s) Oral two times a day  ursodiol Capsule 300 milliGRAM(s) Oral two times a day with meals    MEDICATIONS  (PRN):  acetaminophen   Tablet .. 650 milliGRAM(s) Oral every 6 hours PRN Temp greater or equal to 38C (100.4F), Mild Pain (1 - 3)  diphenhydrAMINE   Injectable 25 milliGRAM(s) IV Push every 4 hours PRN Allergy symptoms  docusate sodium 100 milliGRAM(s) Oral three times a day PRN Constipation  HYDROmorphone  Injectable 0.5 milliGRAM(s) IV Push every 4 hours PRN Severe Pain (7 - 10)  metoclopramide Injectable 10 milliGRAM(s) IV Push every 6 hours PRN nausea  oxyCODONE    IR 5 milliGRAM(s) Oral every 6 hours PRN Moderate Pain (4 - 6)  senna 1 Tablet(s) Oral at bedtime PRN Constipation    Pertinent Labs:  02-20 Na142 mmol/L Glu 88 mg/dL K+ 3.9 mmol/L Cr  0.59 mg/dL BUN 12 mg/dL 02-20 Phos 2.4 mg/dL<L> 02-20 Alb 3.1 g/dL<L>      Skin: No edema, skin intact    Estimated Needs:   [ x] no change since previous assessment  [ ] recalculated:       Previous Nutrition Diagnosis:     Predicted suboptimal energy intake and Obesity class 3        Nutrition Diagnosis is [ x] ongoing  [ ] resolved [ ] not applicable          New Nutrition Diagnosis: [ x] not applicable         Interventions:     Recommend    1. Continue regular diet with glutasolve 1 packet daily, pt enjoys milkshakes though [employer] and plans to order carn[employer] instant breakfast as needed  2. Continue to encourage po intake and obtain/honor food preferences as able, reviewed importance of adequate po intake, not skipping meals, ordering extra nutrient dense snacks on trays to save for between meals to further optimize intake        Monitoring and Evaluation:     [x ] PO intake [x ] Tolerance to diet prescription [x ] weights [x ] follow up per protocol    [ ] other:

## 2019-02-21 LAB
ALBUMIN SERPL ELPH-MCNC: 3 G/DL — LOW (ref 3.3–5)
ALP SERPL-CCNC: 148 U/L — HIGH (ref 40–120)
ALT FLD-CCNC: 19 U/L — SIGNIFICANT CHANGE UP (ref 10–45)
ANION GAP SERPL CALC-SCNC: 10 MMOL/L — SIGNIFICANT CHANGE UP (ref 5–17)
APTT BLD: 42.2 SEC — HIGH (ref 27.5–36.3)
AST SERPL-CCNC: 12 U/L — SIGNIFICANT CHANGE UP (ref 10–40)
BILIRUB SERPL-MCNC: 0.2 MG/DL — SIGNIFICANT CHANGE UP (ref 0.2–1.2)
BUN SERPL-MCNC: 12 MG/DL — SIGNIFICANT CHANGE UP (ref 7–23)
CALCIUM SERPL-MCNC: 9.8 MG/DL — SIGNIFICANT CHANGE UP (ref 8.4–10.5)
CHLORIDE SERPL-SCNC: 108 MMOL/L — SIGNIFICANT CHANGE UP (ref 96–108)
CO2 SERPL-SCNC: 24 MMOL/L — SIGNIFICANT CHANGE UP (ref 22–31)
CREAT SERPL-MCNC: 0.64 MG/DL — SIGNIFICANT CHANGE UP (ref 0.5–1.3)
CULTURE RESULTS: SIGNIFICANT CHANGE UP
CULTURE RESULTS: SIGNIFICANT CHANGE UP
FIBRINOGEN PPP-MCNC: 574 MG/DL — HIGH (ref 350–510)
GLUCOSE SERPL-MCNC: 82 MG/DL — SIGNIFICANT CHANGE UP (ref 70–99)
HCT VFR BLD CALC: 27 % — LOW (ref 34.5–45)
HGB BLD-MCNC: 9 G/DL — LOW (ref 11.5–15.5)
INR BLD: 0.81 RATIO — LOW (ref 0.88–1.16)
LDH SERPL L TO P-CCNC: 122 U/L — SIGNIFICANT CHANGE UP (ref 50–242)
MAGNESIUM SERPL-MCNC: 1.9 MG/DL — SIGNIFICANT CHANGE UP (ref 1.6–2.6)
MCHC RBC-ENTMCNC: 33.2 GM/DL — SIGNIFICANT CHANGE UP (ref 32–36)
MCHC RBC-ENTMCNC: 34.5 PG — HIGH (ref 27–34)
MCV RBC AUTO: 104 FL — HIGH (ref 80–100)
PHOSPHATE SERPL-MCNC: 2.3 MG/DL — LOW (ref 2.5–4.5)
PLATELET # BLD AUTO: 2 K/UL — CRITICAL LOW (ref 150–400)
POTASSIUM SERPL-MCNC: 3.8 MMOL/L — SIGNIFICANT CHANGE UP (ref 3.5–5.3)
POTASSIUM SERPL-SCNC: 3.8 MMOL/L — SIGNIFICANT CHANGE UP (ref 3.5–5.3)
PROT SERPL-MCNC: 5.5 G/DL — LOW (ref 6–8.3)
PROTHROM AB SERPL-ACNC: 9.3 SEC — LOW (ref 10–12.9)
RBC # BLD: 2.61 M/UL — LOW (ref 3.8–5.2)
RBC # FLD: 12.1 % — SIGNIFICANT CHANGE UP (ref 10.3–14.5)
SODIUM SERPL-SCNC: 142 MMOL/L — SIGNIFICANT CHANGE UP (ref 135–145)
SPECIMEN SOURCE: SIGNIFICANT CHANGE UP
SPECIMEN SOURCE: SIGNIFICANT CHANGE UP
TACROLIMUS SERPL-MCNC: 2.2 NG/ML — SIGNIFICANT CHANGE UP
WBC # BLD: 0.1 K/UL — CRITICAL LOW (ref 3.8–10.5)
WBC # FLD AUTO: 0.1 K/UL — CRITICAL LOW (ref 3.8–10.5)

## 2019-02-21 PROCEDURE — 99291 CRITICAL CARE FIRST HOUR: CPT

## 2019-02-21 RX ORDER — POTASSIUM PHOSPHATE, MONOBASIC POTASSIUM PHOSPHATE, DIBASIC 236; 224 MG/ML; MG/ML
15 INJECTION, SOLUTION INTRAVENOUS ONCE
Qty: 0 | Refills: 0 | Status: COMPLETED | OUTPATIENT
Start: 2019-02-21 | End: 2019-02-21

## 2019-02-21 RX ORDER — TACROLIMUS 5 MG/1
2 CAPSULE ORAL EVERY 12 HOURS
Qty: 0 | Refills: 0 | Status: DISCONTINUED | OUTPATIENT
Start: 2019-02-21 | End: 2019-03-01

## 2019-02-21 RX ADMIN — Medication 10 MILLILITER(S): at 00:18

## 2019-02-21 RX ADMIN — TACROLIMUS 1 MILLIGRAM(S): 5 CAPSULE ORAL at 05:02

## 2019-02-21 RX ADMIN — HYDROMORPHONE HYDROCHLORIDE 0.5 MILLIGRAM(S): 2 INJECTION INTRAMUSCULAR; INTRAVENOUS; SUBCUTANEOUS at 06:14

## 2019-02-21 RX ADMIN — Medication 1 LOZENGE: at 12:23

## 2019-02-21 RX ADMIN — Medication 1 LOZENGE: at 15:09

## 2019-02-21 RX ADMIN — Medication 1 TABLET(S): at 12:23

## 2019-02-21 RX ADMIN — TACROLIMUS 2 MILLIGRAM(S): 5 CAPSULE ORAL at 17:37

## 2019-02-21 RX ADMIN — POTASSIUM PHOSPHATE, MONOBASIC POTASSIUM PHOSPHATE, DIBASIC 62.5 MILLIMOLE(S): 236; 224 INJECTION, SOLUTION INTRAVENOUS at 08:55

## 2019-02-21 RX ADMIN — Medication 100 MILLIGRAM(S): at 13:42

## 2019-02-21 RX ADMIN — Medication 100 MILLIGRAM(S): at 05:01

## 2019-02-21 RX ADMIN — Medication 25 MILLIGRAM(S): at 10:00

## 2019-02-21 RX ADMIN — HYDROMORPHONE HYDROCHLORIDE 0.5 MILLIGRAM(S): 2 INJECTION INTRAMUSCULAR; INTRAVENOUS; SUBCUTANEOUS at 20:20

## 2019-02-21 RX ADMIN — Medication 480 MICROGRAM(S): at 14:51

## 2019-02-21 RX ADMIN — CEFEPIME 100 MILLIGRAM(S): 1 INJECTION, POWDER, FOR SOLUTION INTRAMUSCULAR; INTRAVENOUS at 13:42

## 2019-02-21 RX ADMIN — ONDANSETRON 8 MILLIGRAM(S): 8 TABLET, FILM COATED ORAL at 05:04

## 2019-02-21 RX ADMIN — Medication 650 MILLIGRAM(S): at 10:25

## 2019-02-21 RX ADMIN — LEVETIRACETAM 500 MILLIGRAM(S): 250 TABLET, FILM COATED ORAL at 05:01

## 2019-02-21 RX ADMIN — HYDROMORPHONE HYDROCHLORIDE 0.5 MILLIGRAM(S): 2 INJECTION INTRAMUSCULAR; INTRAVENOUS; SUBCUTANEOUS at 09:20

## 2019-02-21 RX ADMIN — Medication 1 LOZENGE: at 20:30

## 2019-02-21 RX ADMIN — NYSTATIN CREAM 1 APPLICATION(S): 100000 CREAM TOPICAL at 22:53

## 2019-02-21 RX ADMIN — HYDROMORPHONE HYDROCHLORIDE 0.5 MILLIGRAM(S): 2 INJECTION INTRAMUSCULAR; INTRAVENOUS; SUBCUTANEOUS at 05:44

## 2019-02-21 RX ADMIN — Medication 5 MILLILITER(S): at 22:54

## 2019-02-21 RX ADMIN — Medication 1 LOZENGE: at 22:54

## 2019-02-21 RX ADMIN — Medication 10 MILLILITER(S): at 12:26

## 2019-02-21 RX ADMIN — MYCOPHENOLATE MOFETIL 1000 MILLIGRAM(S): 250 CAPSULE ORAL at 13:42

## 2019-02-21 RX ADMIN — Medication 400 MILLIGRAM(S): at 22:53

## 2019-02-21 RX ADMIN — HYDROMORPHONE HYDROCHLORIDE 0.5 MILLIGRAM(S): 2 INJECTION INTRAMUSCULAR; INTRAVENOUS; SUBCUTANEOUS at 15:20

## 2019-02-21 RX ADMIN — URSODIOL 300 MILLIGRAM(S): 250 TABLET, FILM COATED ORAL at 17:37

## 2019-02-21 RX ADMIN — Medication 10 MILLILITER(S): at 20:30

## 2019-02-21 RX ADMIN — PANTOPRAZOLE SODIUM 40 MILLIGRAM(S): 20 TABLET, DELAYED RELEASE ORAL at 05:01

## 2019-02-21 RX ADMIN — Medication 400 MILLIGRAM(S): at 13:42

## 2019-02-21 RX ADMIN — URSODIOL 300 MILLIGRAM(S): 250 TABLET, FILM COATED ORAL at 07:58

## 2019-02-21 RX ADMIN — Medication 5 MILLILITER(S): at 15:10

## 2019-02-21 RX ADMIN — LEVETIRACETAM 500 MILLIGRAM(S): 250 TABLET, FILM COATED ORAL at 17:37

## 2019-02-21 RX ADMIN — MYCOPHENOLATE MOFETIL 1000 MILLIGRAM(S): 250 CAPSULE ORAL at 05:01

## 2019-02-21 RX ADMIN — ONDANSETRON 8 MILLIGRAM(S): 8 TABLET, FILM COATED ORAL at 22:54

## 2019-02-21 RX ADMIN — Medication 125 MILLIGRAM(S): at 12:23

## 2019-02-21 RX ADMIN — Medication 1 MILLIGRAM(S): at 12:24

## 2019-02-21 RX ADMIN — MYCOPHENOLATE MOFETIL 1000 MILLIGRAM(S): 250 CAPSULE ORAL at 22:53

## 2019-02-21 RX ADMIN — LOSARTAN POTASSIUM 100 MILLIGRAM(S): 100 TABLET, FILM COATED ORAL at 05:02

## 2019-02-21 RX ADMIN — Medication 10 MILLILITER(S): at 15:54

## 2019-02-21 RX ADMIN — Medication 400 MILLIGRAM(S): at 05:01

## 2019-02-21 RX ADMIN — Medication 5 MILLILITER(S): at 07:58

## 2019-02-21 RX ADMIN — Medication 1 LOZENGE: at 07:58

## 2019-02-21 RX ADMIN — HYDROMORPHONE HYDROCHLORIDE 0.5 MILLIGRAM(S): 2 INJECTION INTRAMUSCULAR; INTRAVENOUS; SUBCUTANEOUS at 15:09

## 2019-02-21 RX ADMIN — Medication 125 MILLIGRAM(S): at 22:54

## 2019-02-21 RX ADMIN — SODIUM CHLORIDE 20 MILLILITER(S): 9 INJECTION INTRAMUSCULAR; INTRAVENOUS; SUBCUTANEOUS at 01:02

## 2019-02-21 RX ADMIN — Medication 25 MILLIGRAM(S): at 20:51

## 2019-02-21 RX ADMIN — NYSTATIN CREAM 1 APPLICATION(S): 100000 CREAM TOPICAL at 05:04

## 2019-02-21 RX ADMIN — Medication 125 MILLIGRAM(S): at 17:37

## 2019-02-21 RX ADMIN — ONDANSETRON 8 MILLIGRAM(S): 8 TABLET, FILM COATED ORAL at 13:43

## 2019-02-21 RX ADMIN — Medication 125 MILLIGRAM(S): at 00:18

## 2019-02-21 RX ADMIN — Medication 100 MILLIGRAM(S): at 22:53

## 2019-02-21 RX ADMIN — FLUCONAZOLE 400 MILLIGRAM(S): 150 TABLET ORAL at 12:24

## 2019-02-21 RX ADMIN — NYSTATIN CREAM 1 APPLICATION(S): 100000 CREAM TOPICAL at 13:43

## 2019-02-21 RX ADMIN — Medication 5 MILLILITER(S): at 20:30

## 2019-02-21 RX ADMIN — CEFEPIME 100 MILLIGRAM(S): 1 INJECTION, POWDER, FOR SOLUTION INTRAMUSCULAR; INTRAVENOUS at 05:02

## 2019-02-21 RX ADMIN — Medication 125 MILLIGRAM(S): at 05:02

## 2019-02-21 RX ADMIN — HYDROMORPHONE HYDROCHLORIDE 0.5 MILLIGRAM(S): 2 INJECTION INTRAMUSCULAR; INTRAVENOUS; SUBCUTANEOUS at 20:00

## 2019-02-21 RX ADMIN — CEFEPIME 100 MILLIGRAM(S): 1 INJECTION, POWDER, FOR SOLUTION INTRAMUSCULAR; INTRAVENOUS at 22:53

## 2019-02-21 RX ADMIN — Medication 5 MILLILITER(S): at 12:23

## 2019-02-21 RX ADMIN — HYDROMORPHONE HYDROCHLORIDE 0.5 MILLIGRAM(S): 2 INJECTION INTRAMUSCULAR; INTRAVENOUS; SUBCUTANEOUS at 09:07

## 2019-02-21 RX ADMIN — Medication 5 MILLILITER(S): at 00:18

## 2019-02-21 RX ADMIN — Medication 1 LOZENGE: at 00:18

## 2019-02-21 RX ADMIN — Medication 10 MILLILITER(S): at 07:59

## 2019-02-21 RX ADMIN — Medication 10 MILLILITER(S): at 22:54

## 2019-02-21 RX ADMIN — HEPARIN SODIUM 4.64 UNIT(S)/HR: 5000 INJECTION INTRAVENOUS; SUBCUTANEOUS at 01:01

## 2019-02-21 NOTE — PROGRESS NOTE ADULT - ATTENDING COMMENTS
58 year old female with PMH [place] chromosome positive ALL diagnosed in 2016 s/p R HyperCVAD X 4 cycles, IT MTX X 2, s/p autologous stem cell transplantation (12/16/16),  who presented in October 2018 with subdural hemorrhage and relapsed disease confirmed by peripheral blood flow cytometry treated with Inotuzumab X 2 cycles.  Bone marrow biopsy on 1/23/19 showed remission with FISH and PCR for BCR-ABL translocation negative on the BM specimen but peripheral blood testing on 1/31 showed .004% BCR-ABL transcript with negative FISH suggesting MRD positivity.  Patient was on Ponatinib, DC 1/31/19. LFT's improved off drug.  Now in CR2    Patient was admitted for haploidentical stem cell transplantation with fludarabine/cytoxan/TBI conditioning. She is s/p HPC transplant, day + 8  Completed high-dose Cytoxan on days +3, +4(GVHD prophylaxis); then begin Tacrolimis, Cellcept on day +5.  Begin Zarxio on day +5    Neutropenic fevers resolved, s/p haplo storm - on Cefepime, culture from 2/14, 2/16 negative.  CXR negative.  On Acyclovir, Fluconazole prophylaxis.  Monitor I/O's/weights- lasix as needed  VOD prophylaxis as per protocol.  Encourage nutrition, mouth care, ambulation.    History of SDH, continue Keppra. CT scan baseline 2/7/19 negative for SDH.   History of HTN continue Losartan.  OOB/ambulate 58 year old female with PMH [place] chromosome positive ALL diagnosed in 2016 s/p R HyperCVAD X 4 cycles, IT MTX X 2, s/p autologous stem cell transplantation (12/16/16),  who presented in October 2018 with subdural hemorrhage and relapsed disease confirmed by peripheral blood flow cytometry treated with Inotuzumab X 2 cycles.  Bone marrow biopsy on 1/23/19 showed remission with FISH and PCR for BCR-ABL translocation negative on the BM specimen but peripheral blood testing on 1/31 showed .004% BCR-ABL transcript with negative FISH suggesting MRD positivity.  Patient was on Ponatinib, DC 1/31/19. LFT's improved off drug.  Now in CR2    Patient was admitted for haploidentical stem cell transplantation with fludarabine/cytoxan/TBI conditioning. She is s/p HPC transplant, day + 8  Completed high-dose Cytoxan on days +3, +4(GVHD prophylaxis); then begin Tacrolimis, Cellcept on day +5.  Begin Zarxio on day +5    Neutropenic fevers resolved, s/p haplo storm - on Cefepime, culture from 2/14, 2/16 negative.  CXR negative.  On Acyclovir, Fluconazole prophylaxis.  Monitor I/O's/weights- lasix as needed  VOD prophylaxis as per protocol.  Encourage nutrition, mouth care, ambulation.    History of SDH, continue Keppra. CT scan baseline 2/7/19 negative for SDH.   History of HTN continue Losartan.  OOB/ambulate.

## 2019-02-21 NOTE — PROGRESS NOTE ADULT - SUBJECTIVE AND OBJECTIVE BOX
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    S: Patient seen and examined with HPC Transplant Team:     Denies mouth / tongue / throat pain, dyspnea, cough, nausea, vomiting, diarrhea, abdominal pain     O: Vitals:   Vital Signs Last 24 Hrs  T(C): 36.1 (2019 05:24), Max: 36.7 (2019 14:00)  T(F): 97 (2019 05:24), Max: 98.1 (2019 14:00)  HR: 72 (2019 05:24) (65 - 82)  BP: 124/80 (2019 05:24) (124/80 - 155/91)  BP(mean): --  RR: 18 (2019 05:24) (18 - 18)  SpO2: 100% (2019 05:24) (97% - 100%)    Admit weight: 111.4 kg   Daily Weight in k.1 (2019 10:41)    Intake / Output:    @ 07:01  -  -21 @ 07:00  --------------------------------------------------------  IN: 2398 mL / OUT: 1700 mL / NET: 698 mL      PE:   Oropharynx: no erythema or ulcers  Oral Mucositis: n/a                                                       stGstrstastdstest:st st1st CVS: S1S2, RRR  Lungs: CTA throughout bilaterally   Abdomen: soft, NT/ND, normoactive BS x 4Q  Extremities: no edema  Gastric Mucositis: n/a                                                 stGstrstastdstest:st st1st Intestinal Mucositis: n/a                                             stGstrstastdstest:st st1st Skin: no rash  TLC: C/D/I  Neuro: A&O x 3   Pain: chronic back pain       Labs:   CBC Full  -  ( 2019 07:09 )  WBC Count : 0.1 K/uL  Hemoglobin : 9.0 g/dL  Hematocrit : 27.0 %  Platelet Count - Automated : x  Mean Cell Volume : 104.0 fl  Mean Cell Hemoglobin : 34.5 pg  Mean Cell Hemoglobin Concentration : 33.2 gm/dL  Auto Neutrophil # : x  Auto Lymphocyte # : x  Auto Monocyte # : x  Auto Eosinophil # : x  Auto Basophil # : x  Auto Neutrophil % : x  Auto Lymphocyte % : x  Auto Monocyte % : x  Auto Eosinophil % : x  Auto Basophil % : x                          9.0    0.1   )-----------( x        ( 2019 07:09 )             27.0         142  |  108  |  12  ----------------------------<  82  3.8   |  24  |  0.64    Ca    9.8      2019 07:08  Phos  2.3       Mg     1.9         TPro  5.5<L>  /  Alb  3.0<L>  /  TBili  0.2  /  DBili  x   /  AST  12  /  ALT  19  /  AlkPhos  148<H>      PT/INR - ( 2019 07:09 )   PT: 9.3 sec;   INR: 0.81 ratio         PTT - ( 2019 07:09 )  PTT:42.2 sec  LIVER FUNCTIONS - ( 2019 07:08 )  Alb: 3.0 g/dL / Pro: 5.5 g/dL / ALK PHOS: 148 U/L / ALT: 19 U/L / AST: 12 U/L / GGT: x           Lactate Dehydrogenase, Serum: 122 U/L ( @ 07:08)    Cultures:   Culture Results: urine  <10,000 CFU/ml Normal Urogenital everton present (19 @ 18:41)    Culture Results: blood  No growth to date. (19 @ 18:30)    Culture Results: blood   No growth to date. (19 @ 18:30)    Radiology:   EXAM:  XR CHEST PORTABLE URGENT 1V                        PROCEDURE DATE:  2019    IMPRESSION:  1.  The lungs are clear.    EXAM:  CT BRAIN                        PROCEDURE DATE:  2019    IMPRESSION:  No acute intracranial abnormality is noted. No subdural collections are   visualized. If the patient has new and persistent symptoms, consider   follow-up brain MRI with and without contrast, if there are no MRI or   contrast contraindications.  New moderate mucosal thickening involves the paranasal sinuses. Correlate   for possible sinusitis      Meds:   Antimicrobials:   acyclovir   Oral Tab/Cap 400 milliGRAM(s) Oral every 8 hours  cefepime   IVPB      cefepime   IVPB 2000 milliGRAM(s) IV Intermittent every 8 hours  clotrimazole Lozenge 1 Lozenge Oral five times a day  fluconAZOLE   Tablet 400 milliGRAM(s) Oral daily  vancomycin    Solution 125 milliGRAM(s) Oral every 6 hours      Heme / Onc:   heparin  Infusion 464 Unit(s)/Hr IV Continuous <Continuous>      GI:  docusate sodium 100 milliGRAM(s) Oral three times a day PRN  pantoprazole    Tablet 40 milliGRAM(s) Oral before breakfast  polyethylene glycol 3350 17 Gram(s) Oral daily  senna 1 Tablet(s) Oral at bedtime PRN  sodium bicarbonate Mouth Rinse 10 milliLiter(s) Swish and Spit five times a day  ursodiol Capsule 300 milliGRAM(s) Oral two times a day with meals      Cardiovascular:   losartan 100 milliGRAM(s) Oral daily      Immunologic:   filgrastim-sndz Injectable 480 MICROGram(s) SubCutaneous daily  immune   globulin 10% (GAMMAGARD) IVPB 20 Gram(s) IV Intermittent <User Schedule>  mycophenolate mofetil 1000 milliGRAM(s) Oral <User Schedule>  tacrolimus 1 milliGRAM(s) Oral two times a day      Other medications:   acetaminophen   Tablet .. 650 milliGRAM(s) Oral every 6 hours  acetaminophen   Tablet .. 650 milliGRAM(s) Oral <User Schedule>  benzonatate 100 milliGRAM(s) Oral every 8 hours  Biotene Dry Mouth Oral Rinse 5 milliLiter(s) Swish and Spit five times a day  diphenhydrAMINE   Injectable 25 milliGRAM(s) IV Push every 3 weeks  folic acid 1 milliGRAM(s) Oral daily  levETIRAcetam 500 milliGRAM(s) Oral two times a day  lidocaine/prilocaine Cream 1 Application(s) Topical daily  multivitamin 1 Tablet(s) Oral daily  nystatin Powder 1 Application(s) Topical every 8 hours  ondansetron Injectable 8 milliGRAM(s) IV Push every 8 hours  potassium phosphate IVPB 15 milliMole(s) IV Intermittent once  sodium chloride 0.9%. 1000 milliLiter(s) IV Continuous <Continuous>      PRN:   acetaminophen   Tablet .. 650 milliGRAM(s) Oral every 6 hours PRN  diphenhydrAMINE   Injectable 25 milliGRAM(s) IV Push every 4 hours PRN  docusate sodium 100 milliGRAM(s) Oral three times a day PRN  HYDROmorphone  Injectable 0.5 milliGRAM(s) IV Push every 4 hours PRN  metoclopramide Injectable 10 milliGRAM(s) IV Push every 6 hours PRN  oxyCODONE    IR 5 milliGRAM(s) Oral every 6 hours PRN  senna 1 Tablet(s) Oral at bedtime PRN    A/P:   58 year old F with a history of Ph+ ALL s/p autologous pbSCT in 2016; relapsed, s/p Inotuzumab now here for Haploidentical PBSCT from son  Post Allogeneic PBSCT day + 8  H/o subdural hematomas on [facility]  Baseline CT Head non-con, no acute intracranial abnormality, possible sinusitis   Cough x 3 weeks, CXR clear, continue Tessalon 100 mg PRN, RVP (-)  Chronic back pain- Dilaudid 0.5 mg IVP q 4 hours PRN  Mild transaminitis - resolved   + c. diff - started vancomycin 125mg po q hours     1. Infectious Disease:   acyclovir   Oral Tab/Cap 400 milliGRAM(s) Oral every 8 hours  cefepime   IVPB      cefepime   IVPB 2000 milliGRAM(s) IV Intermittent every 8 hours  clotrimazole Lozenge 1 Lozenge Oral five times a day  fluconAZOLE   Tablet 400 milliGRAM(s) Oral daily  vancomycin    Solution 125 milliGRAM(s) Oral every 6 hours    2. VOD Prophylaxis: Actigall, Glutamine, Heparin (dosed at 100 units / kg / day)     3. GI Prophylaxis:   pantoprazole    Tablet 40 milliGRAM(s) Oral before breakfast    4. Mouthcare - NS / NaHCO3 rinses, Mycelex, Caphosol; Skin care     5. GVHD prophylaxis   mycophenolate mofetil 1000 milliGRAM(s) Oral <User Schedule>  tacrolimus 1 milliGRAM(s) Oral two times a day    6. Transfuse & replete electrolytes prn   potassium phosphate IVPB 15 milliMole(s) IV Intermittent once    7. IV hydration, daily weights, strict I&O, prn diuresis     8. PO intake as tolerated, nutrition follow up as needed, MVI, folic acid     9. Antiemetics, anti-diarrhea medications:   metoclopramide Injectable 10 milliGRAM(s) IV Push every 6 hours PRN  ondansetron Injectable 8 milliGRAM(s) IV Push every 8 hours    10. OOB as tolerated, physical therapy consult if needed     11. Monitor coags / fibrinogen 2x week, vitamin K as needed     12. Monitor closely for clinical changes, monitor for fevers     13. Emotional support provided, plan of care discussed and questions addressed     14. Patient education done regarding plan of care, restrictions and discharge planning     15. Continue regular social work input     I have written the above note for Dr. Logan who performed service with me in the room.   Janett Barrow NP-C ([phone])    I have seen and examined patient with NP, I agree with above note as scribed. 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    S: Patient seen and examined with HPC Transplant Team:   + cough, improved   + chronic low back pain   No diarrhea since episode yesterday   Denies mouth / tongue / throat pain, dyspnea, nausea, vomiting, abdominal pain     O: Vitals:   Vital Signs Last 24 Hrs  T(C): 36.1 (2019 05:24), Max: 36.7 (2019 14:00)  T(F): 97 (2019 05:24), Max: 98.1 (2019 14:00)  HR: 72 (2019 05:24) (65 - 82)  BP: 124/80 (2019 05:24) (124/80 - 155/91)  BP(mean): --  RR: 18 (2019 05:24) (18 - 18)  SpO2: 100% (2019 05:24) (97% - 100%)    Admit weight: 111.4 kg   Daily Weight in k.1 (2019 10:41)  Today's weight: 112.1kg     Intake / Output:    @ 07:01  -  - @ 07:00  --------------------------------------------------------  IN: 2398 mL / OUT: 1700 mL / NET: 698 mL      PE:   Oropharynx: no erythema or ulcers  Oral Mucositis: n/a                                                       stGstrstastdstest:st st1st CVS: S1S2, RRR  Lungs: CTA throughout bilaterally   Abdomen: soft, NT/ND, normoactive BS x 4Q  Extremities: no edema  Gastric Mucositis: n/a                                                 stGstrstastdstest:st st1st Intestinal Mucositis: n/a                                             stGstrstastdstest:st st1st Skin: no rash  TLC: C/D/I  Neuro: A&O x 3   Pain: chronic back pain       Labs:   CBC Full  -  ( 2019 07:09 )  WBC Count : 0.1 K/uL  Hemoglobin : 9.0 g/dL  Hematocrit : 27.0 %  Platelet Count - Automated : x  Mean Cell Volume : 104.0 fl  Mean Cell Hemoglobin : 34.5 pg  Mean Cell Hemoglobin Concentration : 33.2 gm/dL  Auto Neutrophil # : x  Auto Lymphocyte # : x  Auto Monocyte # : x  Auto Eosinophil # : x  Auto Basophil # : x  Auto Neutrophil % : x  Auto Lymphocyte % : x  Auto Monocyte % : x  Auto Eosinophil % : x  Auto Basophil % : x                          9.0    0.1   )-----------( x        ( 2019 07:09 )             27.0         142  |  108  |  12  ----------------------------<  82  3.8   |  24  |  0.64    Ca    9.8      2019 07:08  Phos  2.3       Mg     1.9         TPro  5.5<L>  /  Alb  3.0<L>  /  TBili  0.2  /  DBili  x   /  AST  12  /  ALT  19  /  AlkPhos  148<H>      PT/INR - ( 2019 07:09 )   PT: 9.3 sec;   INR: 0.81 ratio         PTT - ( 2019 07:09 )  PTT:42.2 sec  LIVER FUNCTIONS - ( 2019 07:08 )  Alb: 3.0 g/dL / Pro: 5.5 g/dL / ALK PHOS: 148 U/L / ALT: 19 U/L / AST: 12 U/L / GGT: x           Lactate Dehydrogenase, Serum: 122 U/L ( @ 07:08)    Cultures:   Culture Results: urine  <10,000 CFU/ml Normal Urogenital everton present (19 @ 18:41)    Culture Results: blood  No growth to date. (19 @ 18:30)    Culture Results: blood   No growth to date. (19 @ 18:30)    Radiology:   EXAM:  XR CHEST PORTABLE URGENT 1V                        PROCEDURE DATE:  2019    IMPRESSION:  1.  The lungs are clear.    EXAM:  CT BRAIN                        PROCEDURE DATE:  2019    IMPRESSION:  No acute intracranial abnormality is noted. No subdural collections are   visualized. If the patient has new and persistent symptoms, consider   follow-up brain MRI with and without contrast, if there are no MRI or   contrast contraindications.  New moderate mucosal thickening involves the paranasal sinuses. Correlate   for possible sinusitis      Meds:   Antimicrobials:   acyclovir   Oral Tab/Cap 400 milliGRAM(s) Oral every 8 hours  cefepime   IVPB      cefepime   IVPB 2000 milliGRAM(s) IV Intermittent every 8 hours  clotrimazole Lozenge 1 Lozenge Oral five times a day  fluconAZOLE   Tablet 400 milliGRAM(s) Oral daily  vancomycin    Solution 125 milliGRAM(s) Oral every 6 hours      Heme / Onc:   heparin  Infusion 464 Unit(s)/Hr IV Continuous <Continuous>      GI:  docusate sodium 100 milliGRAM(s) Oral three times a day PRN  pantoprazole    Tablet 40 milliGRAM(s) Oral before breakfast  polyethylene glycol 3350 17 Gram(s) Oral daily  senna 1 Tablet(s) Oral at bedtime PRN  sodium bicarbonate Mouth Rinse 10 milliLiter(s) Swish and Spit five times a day  ursodiol Capsule 300 milliGRAM(s) Oral two times a day with meals      Cardiovascular:   losartan 100 milliGRAM(s) Oral daily      Immunologic:   filgrastim-sndz Injectable 480 MICROGram(s) SubCutaneous daily  immune   globulin 10% (GAMMAGARD) IVPB 20 Gram(s) IV Intermittent <User Schedule>  mycophenolate mofetil 1000 milliGRAM(s) Oral <User Schedule>  tacrolimus 1 milliGRAM(s) Oral two times a day      Other medications:   acetaminophen   Tablet .. 650 milliGRAM(s) Oral every 6 hours  acetaminophen   Tablet .. 650 milliGRAM(s) Oral <User Schedule>  benzonatate 100 milliGRAM(s) Oral every 8 hours  Biotene Dry Mouth Oral Rinse 5 milliLiter(s) Swish and Spit five times a day  diphenhydrAMINE   Injectable 25 milliGRAM(s) IV Push every 3 weeks  folic acid 1 milliGRAM(s) Oral daily  levETIRAcetam 500 milliGRAM(s) Oral two times a day  lidocaine/prilocaine Cream 1 Application(s) Topical daily  multivitamin 1 Tablet(s) Oral daily  nystatin Powder 1 Application(s) Topical every 8 hours  ondansetron Injectable 8 milliGRAM(s) IV Push every 8 hours  potassium phosphate IVPB 15 milliMole(s) IV Intermittent once  sodium chloride 0.9%. 1000 milliLiter(s) IV Continuous <Continuous>      PRN:   acetaminophen   Tablet .. 650 milliGRAM(s) Oral every 6 hours PRN  diphenhydrAMINE   Injectable 25 milliGRAM(s) IV Push every 4 hours PRN  docusate sodium 100 milliGRAM(s) Oral three times a day PRN  HYDROmorphone  Injectable 0.5 milliGRAM(s) IV Push every 4 hours PRN  metoclopramide Injectable 10 milliGRAM(s) IV Push every 6 hours PRN  oxyCODONE    IR 5 milliGRAM(s) Oral every 6 hours PRN  senna 1 Tablet(s) Oral at bedtime PRN    A/P:   58 year old F with a history of Ph+ ALL s/p autologous pbSCT in 2016; relapsed, s/p Inotuzumab now here for Haploidentical PBSCT from son  Post Allogeneic PBSCT day + 8  H/o subdural hematomas on [facility]  Baseline CT Head non-con, no acute intracranial abnormality, possible sinusitis   Cough x 3 weeks, CXR clear, continue Tessalon 100 mg PRN, RVP (-)  Chronic back pain- Dilaudid 0.5 mg IVP q 4 hours PRN  Mild transaminitis - resolved   + c. diff - started vancomycin 125mg po q hours     1. Infectious Disease:   acyclovir   Oral Tab/Cap 400 milliGRAM(s) Oral every 8 hours  cefepime   IVPB      cefepime   IVPB 2000 milliGRAM(s) IV Intermittent every 8 hours  clotrimazole Lozenge 1 Lozenge Oral five times a day  fluconAZOLE   Tablet 400 milliGRAM(s) Oral daily  vancomycin    Solution 125 milliGRAM(s) Oral every 6 hours    2. VOD Prophylaxis: Actigall, Glutamine, Heparin (dosed at 100 units / kg / day)     3. GI Prophylaxis:   pantoprazole    Tablet 40 milliGRAM(s) Oral before breakfast    4. Mouthcare - NS / NaHCO3 rinses, Mycelex, Caphosol; Skin care     5. GVHD prophylaxis   mycophenolate mofetil 1000 milliGRAM(s) Oral <User Schedule>  tacrolimus 1 milliGRAM(s) Oral two times a day    6. Transfuse & replete electrolytes prn   potassium phosphate IVPB 15 milliMole(s) IV Intermittent once    7. IV hydration, daily weights, strict I&O, prn diuresis     8. PO intake as tolerated, nutrition follow up as needed, MVI, folic acid     9. Antiemetics, anti-diarrhea medications:   metoclopramide Injectable 10 milliGRAM(s) IV Push every 6 hours PRN  ondansetron Injectable 8 milliGRAM(s) IV Push every 8 hours    10. OOB as tolerated, physical therapy consult if needed     11. Monitor coags / fibrinogen 2x week, vitamin K as needed     12. Monitor closely for clinical changes, monitor for fevers     13. Emotional support provided, plan of care discussed and questions addressed     14. Patient education done regarding plan of care, restrictions and discharge planning     15. Continue regular social work input     I have written the above note for Dr. Logan who performed service with me in the room.   Jaentt Barrow NP-C ([phone])    I have seen and examined patient with NP, I agree with above note as scribed.

## 2019-02-21 NOTE — PROVIDER CONTACT NOTE (OTHER) - ACTION/TREATMENT ORDERED:
No new orders
cefepime 2 gm iv stat blood culture x2 ua and urine culture chest xray and tylenol 650 mg po
platelet is ordered for later

## 2019-02-22 LAB
ALBUMIN SERPL ELPH-MCNC: 3.2 G/DL — LOW (ref 3.3–5)
ALP SERPL-CCNC: 146 U/L — HIGH (ref 40–120)
ALT FLD-CCNC: 17 U/L — SIGNIFICANT CHANGE UP (ref 10–45)
ANION GAP SERPL CALC-SCNC: 9 MMOL/L — SIGNIFICANT CHANGE UP (ref 5–17)
AST SERPL-CCNC: 11 U/L — SIGNIFICANT CHANGE UP (ref 10–40)
BILIRUB DIRECT SERPL-MCNC: <0.1 MG/DL — SIGNIFICANT CHANGE UP (ref 0–0.2)
BILIRUB INDIRECT FLD-MCNC: >0.1 MG/DL — LOW (ref 0.2–1)
BILIRUB SERPL-MCNC: 0.2 MG/DL — SIGNIFICANT CHANGE UP (ref 0.2–1.2)
BLD GP AB SCN SERPL QL: NEGATIVE — SIGNIFICANT CHANGE UP
BUN SERPL-MCNC: 10 MG/DL — SIGNIFICANT CHANGE UP (ref 7–23)
CALCIUM SERPL-MCNC: 9.8 MG/DL — SIGNIFICANT CHANGE UP (ref 8.4–10.5)
CHLORIDE SERPL-SCNC: 111 MMOL/L — HIGH (ref 96–108)
CO2 SERPL-SCNC: 25 MMOL/L — SIGNIFICANT CHANGE UP (ref 22–31)
CREAT SERPL-MCNC: 0.58 MG/DL — SIGNIFICANT CHANGE UP (ref 0.5–1.3)
GLUCOSE SERPL-MCNC: 81 MG/DL — SIGNIFICANT CHANGE UP (ref 70–99)
HCT VFR BLD CALC: 26.5 % — LOW (ref 34.5–45)
HGB BLD-MCNC: 8.7 G/DL — LOW (ref 11.5–15.5)
LDH SERPL L TO P-CCNC: 115 U/L — SIGNIFICANT CHANGE UP (ref 50–242)
MAGNESIUM SERPL-MCNC: 1.8 MG/DL — SIGNIFICANT CHANGE UP (ref 1.6–2.6)
MCHC RBC-ENTMCNC: 32.9 GM/DL — SIGNIFICANT CHANGE UP (ref 32–36)
MCHC RBC-ENTMCNC: 34.1 PG — HIGH (ref 27–34)
MCV RBC AUTO: 104 FL — HIGH (ref 80–100)
PHOSPHATE SERPL-MCNC: 3.1 MG/DL — SIGNIFICANT CHANGE UP (ref 2.5–4.5)
PLATELET # BLD AUTO: 3 K/UL — CRITICAL LOW (ref 150–400)
POTASSIUM SERPL-MCNC: 4.2 MMOL/L — SIGNIFICANT CHANGE UP (ref 3.5–5.3)
POTASSIUM SERPL-SCNC: 4.2 MMOL/L — SIGNIFICANT CHANGE UP (ref 3.5–5.3)
PROT SERPL-MCNC: 5.7 G/DL — LOW (ref 6–8.3)
RBC # BLD: 2.55 M/UL — LOW (ref 3.8–5.2)
RBC # FLD: 12.3 % — SIGNIFICANT CHANGE UP (ref 10.3–14.5)
RH IG SCN BLD-IMP: POSITIVE — SIGNIFICANT CHANGE UP
SODIUM SERPL-SCNC: 145 MMOL/L — SIGNIFICANT CHANGE UP (ref 135–145)
WBC # BLD: 0.1 K/UL — CRITICAL LOW (ref 3.8–10.5)
WBC # FLD AUTO: 0.1 K/UL — CRITICAL LOW (ref 3.8–10.5)

## 2019-02-22 PROCEDURE — 99291 CRITICAL CARE FIRST HOUR: CPT

## 2019-02-22 RX ORDER — URSODIOL 250 MG/1
1 TABLET, FILM COATED ORAL
Qty: 60 | Refills: 3
Start: 2019-02-22 | End: 2019-06-21

## 2019-02-22 RX ORDER — LOSARTAN POTASSIUM 100 MG/1
1 TABLET, FILM COATED ORAL
Qty: 30 | Refills: 0
Start: 2019-02-22 | End: 2019-03-23

## 2019-02-22 RX ORDER — MYCOPHENOLATE MOFETIL 250 MG/1
1000 CAPSULE ORAL
Qty: 90000 | Refills: 0 | OUTPATIENT
Start: 2019-02-22 | End: 2019-03-23

## 2019-02-22 RX ORDER — FLUCONAZOLE 150 MG/1
2 TABLET ORAL
Qty: 60 | Refills: 3
Start: 2019-02-22 | End: 2019-06-21

## 2019-02-22 RX ORDER — PANTOPRAZOLE SODIUM 20 MG/1
1 TABLET, DELAYED RELEASE ORAL
Qty: 30 | Refills: 3
Start: 2019-02-22 | End: 2019-06-21

## 2019-02-22 RX ORDER — ONDANSETRON 8 MG/1
1 TABLET, FILM COATED ORAL
Qty: 45 | Refills: 0
Start: 2019-02-22 | End: 2019-03-08

## 2019-02-22 RX ORDER — HYDROMORPHONE HYDROCHLORIDE 2 MG/ML
0.5 INJECTION INTRAMUSCULAR; INTRAVENOUS; SUBCUTANEOUS EVERY 4 HOURS
Qty: 0 | Refills: 0 | Status: DISCONTINUED | OUTPATIENT
Start: 2019-02-22 | End: 2019-02-24

## 2019-02-22 RX ORDER — TACROLIMUS 5 MG/1
2 CAPSULE ORAL
Qty: 120 | Refills: 3 | OUTPATIENT
Start: 2019-02-22 | End: 2019-06-21

## 2019-02-22 RX ORDER — ACYCLOVIR SODIUM 500 MG
1 VIAL (EA) INTRAVENOUS
Qty: 90 | Refills: 3 | OUTPATIENT
Start: 2019-02-22 | End: 2019-06-21

## 2019-02-22 RX ORDER — LEVETIRACETAM 250 MG/1
1 TABLET, FILM COATED ORAL
Qty: 60 | Refills: 0
Start: 2019-02-22 | End: 2019-03-23

## 2019-02-22 RX ORDER — FOLIC ACID 0.8 MG
1 TABLET ORAL
Qty: 30 | Refills: 3
Start: 2019-02-22 | End: 2019-06-21

## 2019-02-22 RX ORDER — ATOVAQUONE 750 MG/5ML
5 SUSPENSION ORAL
Qty: 300 | Refills: 3
Start: 2019-02-22 | End: 2019-06-21

## 2019-02-22 RX ORDER — METOCLOPRAMIDE HCL 10 MG
1 TABLET ORAL
Qty: 60 | Refills: 0
Start: 2019-02-22 | End: 2019-03-08

## 2019-02-22 RX ORDER — VANCOMYCIN HCL 1 G
1 VIAL (EA) INTRAVENOUS
Qty: 60 | Refills: 0 | OUTPATIENT
Start: 2019-02-22 | End: 2019-03-08

## 2019-02-22 RX ADMIN — HYDROMORPHONE HYDROCHLORIDE 0.5 MILLIGRAM(S): 2 INJECTION INTRAMUSCULAR; INTRAVENOUS; SUBCUTANEOUS at 17:04

## 2019-02-22 RX ADMIN — Medication 1 LOZENGE: at 20:22

## 2019-02-22 RX ADMIN — Medication 10 MILLILITER(S): at 11:51

## 2019-02-22 RX ADMIN — NYSTATIN CREAM 1 APPLICATION(S): 100000 CREAM TOPICAL at 21:24

## 2019-02-22 RX ADMIN — Medication 1 LOZENGE: at 11:48

## 2019-02-22 RX ADMIN — MYCOPHENOLATE MOFETIL 1000 MILLIGRAM(S): 250 CAPSULE ORAL at 21:23

## 2019-02-22 RX ADMIN — Medication 1 TABLET(S): at 11:50

## 2019-02-22 RX ADMIN — Medication 400 MILLIGRAM(S): at 05:30

## 2019-02-22 RX ADMIN — NYSTATIN CREAM 1 APPLICATION(S): 100000 CREAM TOPICAL at 15:29

## 2019-02-22 RX ADMIN — ONDANSETRON 8 MILLIGRAM(S): 8 TABLET, FILM COATED ORAL at 05:29

## 2019-02-22 RX ADMIN — Medication 125 MILLIGRAM(S): at 11:50

## 2019-02-22 RX ADMIN — CEFEPIME 100 MILLIGRAM(S): 1 INJECTION, POWDER, FOR SOLUTION INTRAMUSCULAR; INTRAVENOUS at 21:24

## 2019-02-22 RX ADMIN — Medication 100 MILLIGRAM(S): at 21:24

## 2019-02-22 RX ADMIN — HYDROMORPHONE HYDROCHLORIDE 0.5 MILLIGRAM(S): 2 INJECTION INTRAMUSCULAR; INTRAVENOUS; SUBCUTANEOUS at 11:25

## 2019-02-22 RX ADMIN — Medication 25 MILLIGRAM(S): at 20:45

## 2019-02-22 RX ADMIN — URSODIOL 300 MILLIGRAM(S): 250 TABLET, FILM COATED ORAL at 17:05

## 2019-02-22 RX ADMIN — Medication 125 MILLIGRAM(S): at 06:29

## 2019-02-22 RX ADMIN — HYDROMORPHONE HYDROCHLORIDE 0.5 MILLIGRAM(S): 2 INJECTION INTRAMUSCULAR; INTRAVENOUS; SUBCUTANEOUS at 06:29

## 2019-02-22 RX ADMIN — HYDROMORPHONE HYDROCHLORIDE 0.5 MILLIGRAM(S): 2 INJECTION INTRAMUSCULAR; INTRAVENOUS; SUBCUTANEOUS at 21:15

## 2019-02-22 RX ADMIN — Medication 480 MICROGRAM(S): at 15:29

## 2019-02-22 RX ADMIN — MYCOPHENOLATE MOFETIL 1000 MILLIGRAM(S): 250 CAPSULE ORAL at 05:30

## 2019-02-22 RX ADMIN — Medication 1 LOZENGE: at 15:31

## 2019-02-22 RX ADMIN — FLUCONAZOLE 400 MILLIGRAM(S): 150 TABLET ORAL at 11:49

## 2019-02-22 RX ADMIN — HYDROMORPHONE HYDROCHLORIDE 0.5 MILLIGRAM(S): 2 INJECTION INTRAMUSCULAR; INTRAVENOUS; SUBCUTANEOUS at 17:15

## 2019-02-22 RX ADMIN — PANTOPRAZOLE SODIUM 40 MILLIGRAM(S): 20 TABLET, DELAYED RELEASE ORAL at 05:30

## 2019-02-22 RX ADMIN — Medication 650 MILLIGRAM(S): at 08:30

## 2019-02-22 RX ADMIN — Medication 5 MILLILITER(S): at 07:55

## 2019-02-22 RX ADMIN — Medication 100 MILLIGRAM(S): at 13:03

## 2019-02-22 RX ADMIN — Medication 10 MILLILITER(S): at 20:23

## 2019-02-22 RX ADMIN — LEVETIRACETAM 500 MILLIGRAM(S): 250 TABLET, FILM COATED ORAL at 17:05

## 2019-02-22 RX ADMIN — MYCOPHENOLATE MOFETIL 1000 MILLIGRAM(S): 250 CAPSULE ORAL at 13:03

## 2019-02-22 RX ADMIN — Medication 125 MILLIGRAM(S): at 17:04

## 2019-02-22 RX ADMIN — Medication 25 MILLIGRAM(S): at 08:30

## 2019-02-22 RX ADMIN — HYDROMORPHONE HYDROCHLORIDE 0.5 MILLIGRAM(S): 2 INJECTION INTRAMUSCULAR; INTRAVENOUS; SUBCUTANEOUS at 21:26

## 2019-02-22 RX ADMIN — HYDROMORPHONE HYDROCHLORIDE 0.5 MILLIGRAM(S): 2 INJECTION INTRAMUSCULAR; INTRAVENOUS; SUBCUTANEOUS at 11:11

## 2019-02-22 RX ADMIN — Medication 100 MILLIGRAM(S): at 05:30

## 2019-02-22 RX ADMIN — Medication 1 MILLIGRAM(S): at 11:50

## 2019-02-22 RX ADMIN — LEVETIRACETAM 500 MILLIGRAM(S): 250 TABLET, FILM COATED ORAL at 05:30

## 2019-02-22 RX ADMIN — TACROLIMUS 2 MILLIGRAM(S): 5 CAPSULE ORAL at 06:28

## 2019-02-22 RX ADMIN — TACROLIMUS 2 MILLIGRAM(S): 5 CAPSULE ORAL at 17:05

## 2019-02-22 RX ADMIN — Medication 5 MILLILITER(S): at 20:22

## 2019-02-22 RX ADMIN — URSODIOL 300 MILLIGRAM(S): 250 TABLET, FILM COATED ORAL at 07:55

## 2019-02-22 RX ADMIN — CEFEPIME 100 MILLIGRAM(S): 1 INJECTION, POWDER, FOR SOLUTION INTRAMUSCULAR; INTRAVENOUS at 05:29

## 2019-02-22 RX ADMIN — Medication 10 MILLILITER(S): at 07:55

## 2019-02-22 RX ADMIN — Medication 1 LOZENGE: at 07:55

## 2019-02-22 RX ADMIN — Medication 10 MILLILITER(S): at 15:36

## 2019-02-22 RX ADMIN — HYDROMORPHONE HYDROCHLORIDE 0.5 MILLIGRAM(S): 2 INJECTION INTRAMUSCULAR; INTRAVENOUS; SUBCUTANEOUS at 05:30

## 2019-02-22 RX ADMIN — NYSTATIN CREAM 1 APPLICATION(S): 100000 CREAM TOPICAL at 05:30

## 2019-02-22 RX ADMIN — LOSARTAN POTASSIUM 100 MILLIGRAM(S): 100 TABLET, FILM COATED ORAL at 05:30

## 2019-02-22 RX ADMIN — CEFEPIME 100 MILLIGRAM(S): 1 INJECTION, POWDER, FOR SOLUTION INTRAMUSCULAR; INTRAVENOUS at 13:02

## 2019-02-22 RX ADMIN — Medication 5 MILLILITER(S): at 15:30

## 2019-02-22 RX ADMIN — ONDANSETRON 8 MILLIGRAM(S): 8 TABLET, FILM COATED ORAL at 13:03

## 2019-02-22 RX ADMIN — Medication 5 MILLILITER(S): at 11:11

## 2019-02-22 RX ADMIN — Medication 400 MILLIGRAM(S): at 21:23

## 2019-02-22 RX ADMIN — Medication 400 MILLIGRAM(S): at 13:03

## 2019-02-22 RX ADMIN — ONDANSETRON 8 MILLIGRAM(S): 8 TABLET, FILM COATED ORAL at 21:24

## 2019-02-22 NOTE — PROGRESS NOTE ADULT - ATTENDING COMMENTS
58 year old female with PMH [place] chromosome positive ALL diagnosed in 2016 s/p R HyperCVAD X 4 cycles, IT MTX X 2, s/p autologous stem cell transplantation (12/16/16),  who presented in October 2018 with subdural hemorrhage and relapsed disease confirmed by peripheral blood flow cytometry treated with Inotuzumab X 2 cycles.  Bone marrow biopsy on 1/23/19 showed remission with FISH and PCR for BCR-ABL translocation negative on the BM specimen but peripheral blood testing on 1/31 showed .004% BCR-ABL transcript with negative FISH suggesting MRD positivity.  Patient was on Ponatinib, DC 1/31/19. LFT's improved off drug.  Now in CR2    Patient was admitted for haploidentical stem cell transplantation with fludarabine/cytoxan/TBI conditioning. She is s/p HPC transplant, day + 8  Completed high-dose Cytoxan on days +3, +4(GVHD prophylaxis); then begin Tacrolimis, Cellcept on day +5.  Begin Zarxio on day +5    Neutropenic fevers resolved, s/p haplo storm - on Cefepime, culture from 2/14, 2/16 negative.  CXR negative.  On Acyclovir, Fluconazole prophylaxis.  Monitor I/O's/weights- lasix as needed  VOD prophylaxis as per protocol.  Encourage nutrition, mouth care, ambulation.    History of SDH, continue Keppra. CT scan baseline 2/7/19 negative for SDH.   History of HTN continue Losartan.  OOB/ambulate. 58 year old female with PMH [place] chromosome positive ALL diagnosed in 2016 s/p R HyperCVAD X 4 cycles, IT MTX X 2, s/p autologous stem cell transplantation (12/16/16),  who presented in October 2018 with subdural hemorrhage and relapsed disease confirmed by peripheral blood flow cytometry treated with Inotuzumab X 2 cycles.  Bone marrow biopsy on 1/23/19 showed remission with FISH and PCR for BCR-ABL translocation negative on the BM specimen but peripheral blood testing on 1/31 showed .004% BCR-ABL transcript with negative FISH suggesting MRD positivity.  Patient was on Ponatinib, DC 1/31/19. LFT's improved off drug.  Now in CR2    Patient was admitted for haploidentical stem cell transplantation with fludarabine/cytoxan/TBI conditioning. She is s/p HPC transplant, day + 9  Completed high-dose Cytoxan on days +3, +4(GVHD prophylaxis); then begin Tacrolimis, Cellcept on day +5.  Begin Zarxio on day +5    Neutropenic fevers resolved, s/p haplo storm - on Cefepime, culture from 2/14, 2/16 negative.  CXR negative.  On Acyclovir, Fluconazole prophylaxis.  C. Diff diarrhea- continue oral Vancomycin  Monitor I/O's/weights- lasix as needed  VOD prophylaxis as per protocol.  Encourage nutrition, mouth care, ambulation.    History of SDH, continue Keppra. CT scan baseline 2/7/19 negative for SDH.   History of HTN continue Losartan.  OOB/ambulate.

## 2019-02-22 NOTE — PROGRESS NOTE ADULT - SUBJECTIVE AND OBJECTIVE BOX
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    S: Patient seen and examined with HPC Transplant Team:   + mild headache    Denies mouth / tongue / throat pain, dyspnea, nausea, vomiting, abdominal pain     O: Vitals:   Vital Signs Last 24 Hrs  T(C): 36.3 (2019 05:35), Max: 36.7 (2019 14:21)  T(F): 97.3 (2019 05:35), Max: 98.1 (2019 14:21)  HR: 76 (2019 05:35) (75 - 101)  BP: 138/73 (2019 05:35) (117/72 - 154/89)  BP(mean): --  RR: 18 (2019 05:35) (18 - 20)  SpO2: 98% (2019 05:35) (98% - 100%)    Admit weight:   Daily Weight in k.1 (2019 10:00)    Intake / Output:    @ 07: @ 07:00  --------------------------------------------------------  IN: 2109.9 mL / OUT: 2625 mL / NET: -515.1 mL     @ 07: @ 08:26  --------------------------------------------------------  IN: 132 mL / OUT: 200 mL / NET: -68 mL        PE:   Oropharynx: no erythema or ulcers  Oral Mucositis: n/a                                                       stGstrstastdstest:st st1st CVS: S1S2, RRR  Lungs: CTA throughout bilaterally   Abdomen: soft, NT/ND, normoactive BS x 4Q  Extremities: no edema  Gastric Mucositis: n/a                                                 stGstrstastdstest:st st1st Intestinal Mucositis: n/a                                             stGstrstastdstest:st st1st Skin: no rash  TLC: C/D/I  Neuro: A&O x 3   Pain: chronic back pain       Labs:   CBC Full  -  ( 2019 07:11 )  WBC Count : 0.1 K/uL  Hemoglobin : 8.7 g/dL  Hematocrit : 26.5 %  Platelet Count - Automated : x  Mean Cell Volume : 104.0 fl  Mean Cell Hemoglobin : 34.1 pg  Mean Cell Hemoglobin Concentration : 32.9 gm/dL  Auto Neutrophil # : x  Auto Lymphocyte # : x  Auto Monocyte # : x  Auto Eosinophil # : x  Auto Basophil # : x  Auto Neutrophil % : x  Auto Lymphocyte % : x  Auto Monocyte % : x  Auto Eosinophil % : x  Auto Basophil % : x                          8.7    0.1   )-----------( x        ( 2019 07:11 )             26.5         145  |  111<H>  |  10  ----------------------------<  81  4.2   |  25  |  0.58    Ca    9.8      2019 07:08  Phos  3.1       Mg     1.8         TPro  5.7<L>  /  Alb  3.2<L>  /  TBili  0.2  /  DBili  <0.1  /  AST  11  /  ALT  17  /  AlkPhos  146<H>      PT/INR - ( 2019 07:09 )   PT: 9.3 sec;   INR: 0.81 ratio         PTT - ( 2019 07:09 )  PTT:42.2 sec  LIVER FUNCTIONS - ( 2019 07:08 )  Alb: 3.2 g/dL / Pro: 5.7 g/dL / ALK PHOS: 146 U/L / ALT: 17 U/L / AST: 11 U/L / GGT: x           Lactate Dehydrogenase, Serum: 115 U/L ( @ 07:08)        @ 10:06  Tacrolimus 2.2                Cyclosporine --      Cultures:   Culture Results: urine  <10,000 CFU/ml Normal Urogenital everton present (19 @ 18:41)    Culture Results: blood  No growth to date. (19 @ 18:30)    Culture Results: blood   No growth to date. (19 @ 18:30)    Radiology:   EXAM:  XR CHEST PORTABLE URGENT 1V                        PROCEDURE DATE:  2019    IMPRESSION:  1.  The lungs are clear.    EXAM:  CT BRAIN                        PROCEDURE DATE:  2019    IMPRESSION:  No acute intracranial abnormality is noted. No subdural collections are   visualized. If the patient has new and persistent symptoms, consider   follow-up brain MRI with and without contrast, if there are no MRI or   contrast contraindications.  New moderate mucosal thickening involves the paranasal sinuses. Correlate   for possible sinusitis        Meds:   Antimicrobials:   acyclovir   Oral Tab/Cap 400 milliGRAM(s) Oral every 8 hours  cefepime   IVPB      cefepime   IVPB 2000 milliGRAM(s) IV Intermittent every 8 hours  clotrimazole Lozenge 1 Lozenge Oral five times a day  fluconAZOLE   Tablet 400 milliGRAM(s) Oral daily  vancomycin    Solution 125 milliGRAM(s) Oral every 6 hours      Heme / Onc:   heparin  Infusion 464 Unit(s)/Hr IV Continuous <Continuous>      GI:  docusate sodium 100 milliGRAM(s) Oral three times a day PRN  pantoprazole    Tablet 40 milliGRAM(s) Oral before breakfast  polyethylene glycol 3350 17 Gram(s) Oral daily  senna 1 Tablet(s) Oral at bedtime PRN  sodium bicarbonate Mouth Rinse 10 milliLiter(s) Swish and Spit five times a day  ursodiol Capsule 300 milliGRAM(s) Oral two times a day with meals      Cardiovascular:   losartan 100 milliGRAM(s) Oral daily      Immunologic:   filgrastim-sndz Injectable 480 MICROGram(s) SubCutaneous daily  immune   globulin 10% (GAMMAGARD) IVPB 20 Gram(s) IV Intermittent <User Schedule>  mycophenolate mofetil 1000 milliGRAM(s) Oral <User Schedule>  tacrolimus 2 milliGRAM(s) Oral every 12 hours      Other medications:   acetaminophen   Tablet .. 650 milliGRAM(s) Oral every 6 hours  acetaminophen   Tablet .. 650 milliGRAM(s) Oral <User Schedule>  benzonatate 100 milliGRAM(s) Oral every 8 hours  Biotene Dry Mouth Oral Rinse 5 milliLiter(s) Swish and Spit five times a day  diphenhydrAMINE   Injectable 25 milliGRAM(s) IV Push every 3 weeks  folic acid 1 milliGRAM(s) Oral daily  levETIRAcetam 500 milliGRAM(s) Oral two times a day  lidocaine/prilocaine Cream 1 Application(s) Topical daily  multivitamin 1 Tablet(s) Oral daily  nystatin Powder 1 Application(s) Topical every 8 hours  ondansetron Injectable 8 milliGRAM(s) IV Push every 8 hours  sodium chloride 0.9%. 1000 milliLiter(s) IV Continuous <Continuous>      PRN:   acetaminophen   Tablet .. 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 diff - started vancomycin 125mg po q hours     1. Infectious Disease:   acyclovir   Oral Tab/Cap 400 milliGRAM(s) Oral every 8 hours  cefepime   IVPB      cefepime   IVPB 2000 milliGRAM(s) IV Intermittent every 8 hours  clotrimazole Lozenge 1 Lozenge Oral five times a day  fluconAZOLE   Tablet 400 milliGRAM(s) Oral daily  vancomycin    Solution 125 milliGRAM(s) Oral every 6 hours    2. VOD Prophylaxis: Actigall, Glutamine, Heparin (dosed at 100 units / kg / day)     3. GI Prophylaxis:   pantoprazole    Tablet 40 milliGRAM(s) Oral before breakfast    4. Mouthcare - NS / NaHCO3 rinses, Mycelex, Caphosol; Skin care     5. GVHD prophylaxis   mycophenolate mofetil 1000 milliGRAM(s) Oral <User Schedule>  tacrolimus 2 milliGRAM(s) Oral two times a day    6. Transfuse & replete electrolytes prn       7. IV hydration, daily weights, strict I&O, prn diuresis     8. PO intake as tolerated, nutrition follow up as needed, MVI, folic acid     9. Antiemetics, anti-diarrhea medications:   metoclopramide Injectable 10 milliGRAM(s) IV Push every 6 hours PRN  ondansetron Injectable 8 milliGRAM(s) IV Push every 8 hours    10. OOB as tolerated, physical therapy consult if needed     11. Monitor coags / fibrinogen 2x week, vitamin K as needed     12. Monitor closely for clinical changes, monitor for fevers     13. Emotional support provided, plan of care discussed and questions addressed     14. Patient education done regarding plan of care, restrictions and discharge planning     15. Continue regular social work input     I have written the above note for Dr. Logan who performed service with me in the room.   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    S: Patient seen and examined with HPC Transplant Team:   + mild headache   left upper arm bruise, blood blister in the mouth   Denies mouth / tongue / throat pain, dyspnea, nausea, vomiting, abdominal pain     O: Vitals:   Vital Signs Last 24 Hrs  T(C): 36.3 (2019 05:35), Max: 36.7 (2019 14:21)  T(F): 97.3 (2019 05:35), Max: 98.1 (2019 14:21)  HR: 76 (2019 05:35) (75 - 101)  BP: 138/73 (2019 05:35) (117/72 - 154/89)  BP(mean): --  RR: 18 (2019 05:35) (18 - 20)  SpO2: 98% (2019 05:35) (98% - 100%)    Admit weight: 111.4kg   Daily Weight in k.1 (2019 10:00)  Today's weight:     Intake / Output:    @ 07: @ 07:00  --------------------------------------------------------  IN: 2109.9 mL / OUT: 2625 mL / NET: -515.1 mL     @ 07:01  -   @ 08:26  --------------------------------------------------------  IN: 132 mL / OUT: 200 mL / NET: -68 mL        PE:   Oropharynx: no erythema or ulcers  Oral Mucositis: n/a                                                       stGstrstastdstest:st st1st CVS: S1S2, RRR  Lungs: CTA throughout bilaterally   Abdomen: soft, NT/ND, normoactive BS x 4Q  Extremities: no edema  Gastric Mucositis: n/a                                                 stGstrstastdstest:st st1st Intestinal Mucositis: n/a                                             stGstrstastdstest:st st1st Skin: no rash  TLC: C/D/I  Neuro: A&O x 3   Pain: chronic back pain       Labs:   CBC Full  -  ( 2019 07:11 )  WBC Count : 0.1 K/uL  Hemoglobin : 8.7 g/dL  Hematocrit : 26.5 %  Platelet Count - Automated : x  Mean Cell Volume : 104.0 fl  Mean Cell Hemoglobin : 34.1 pg  Mean Cell Hemoglobin Concentration : 32.9 gm/dL  Auto Neutrophil # : x  Auto Lymphocyte # : x  Auto Monocyte # : x  Auto Eosinophil # : x  Auto Basophil # : x  Auto Neutrophil % : x  Auto Lymphocyte % : x  Auto Monocyte % : x  Auto Eosinophil % : x  Auto Basophil % : x                          8.7    0.1   )-----------( x        ( 2019 07:11 )             26.5         145  |  111<H>  |  10  ----------------------------<  81  4.2   |  25  |  0.58    Ca    9.8      2019 07:08  Phos  3.1       Mg     1.8         TPro  5.7<L>  /  Alb  3.2<L>  /  TBili  0.2  /  DBili  <0.1  /  AST  11  /  ALT  17  /  AlkPhos  146<H>      PT/INR - ( 2019 07:09 )   PT: 9.3 sec;   INR: 0.81 ratio         PTT - ( 2019 07:09 )  PTT:42.2 sec  LIVER FUNCTIONS - ( 2019 07:08 )  Alb: 3.2 g/dL / Pro: 5.7 g/dL / ALK PHOS: 146 U/L / ALT: 17 U/L / AST: 11 U/L / GGT: x           Lactate Dehydrogenase, Serum: 115 U/L ( @ 07:08)        @ 10:06  Tacrolimus 2.2                Cyclosporine --      Cultures:   Culture Results: urine  <10,000 CFU/ml Normal Urogenital everton present (19 @ 18:41)    Culture Results: blood  No growth to date. (19 @ 18:30)    Culture Results: blood   No growth to date. (19 @ 18:30)    Radiology:   EXAM:  XR CHEST PORTABLE URGENT 1V                        PROCEDURE DATE:  2019    IMPRESSION:  1.  The lungs are clear.    EXAM:  CT BRAIN                        PROCEDURE DATE:  2019    IMPRESSION:  No acute intracranial abnormality is noted. No subdural collections are   visualized. If the patient has new and persistent symptoms, consider   follow-up brain MRI with and without contrast, if there are no MRI or   contrast contraindications.  New moderate mucosal thickening involves the paranasal sinuses. Correlate   for possible sinusitis        Meds:   Antimicrobials:   acyclovir   Oral Tab/Cap 400 milliGRAM(s) Oral every 8 hours  cefepime   IVPB      cefepime   IVPB 2000 milliGRAM(s) IV Intermittent every 8 hours  clotrimazole Lozenge 1 Lozenge Oral five times a day  fluconAZOLE   Tablet 400 milliGRAM(s) Oral daily  vancomycin    Solution 125 milliGRAM(s) Oral every 6 hours      Heme / Onc:   heparin  Infusion 464 Unit(s)/Hr IV Continuous <Continuous>      GI:  docusate sodium 100 milliGRAM(s) Oral three times a day PRN  pantoprazole    Tablet 40 milliGRAM(s) Oral before breakfast  polyethylene glycol 3350 17 Gram(s) Oral daily  senna 1 Tablet(s) Oral at bedtime PRN  sodium bicarbonate Mouth Rinse 10 milliLiter(s) Swish and Spit five times a day  ursodiol Capsule 300 milliGRAM(s) Oral two times a day with meals      Cardiovascular:   losartan 100 milliGRAM(s) Oral daily      Immunologic:   filgrastim-sndz Injectable 480 MICROGram(s) SubCutaneous daily  immune   globulin 10% (GAMMAGARD) IVPB 20 Gram(s) IV Intermittent <User Schedule>  mycophenolate mofetil 1000 milliGRAM(s) Oral <User Schedule>  tacrolimus 2 milliGRAM(s) Oral every 12 hours      Other medications:   acetaminophen   Tablet .. 650 milliGRAM(s) Oral every 6 hours  acetaminophen   Tablet .. 650 milliGRAM(s) Oral <User Schedule>  benzonatate 100 milliGRAM(s) Oral every 8 hours  Biotene Dry Mouth Oral Rinse 5 milliLiter(s) Swish and Spit five times a day  diphenhydrAMINE   Injectable 25 milliGRAM(s) IV Push every 3 weeks  folic acid 1 milliGRAM(s) Oral daily  levETIRAcetam 500 milliGRAM(s) Oral two times a day  lidocaine/prilocaine Cream 1 Application(s) Topical daily  multivitamin 1 Tablet(s) Oral daily  nystatin Powder 1 Application(s) Topical every 8 hours  ondansetron Injectable 8 milliGRAM(s) IV Push every 8 hours  sodium chloride 0.9%. 1000 milliLiter(s) IV Continuous <Continuous>      PRN:   acetaminophen   Tablet .. 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 diff - started vancomycin 125mg po q hours   Platelet refractory - platelets dispensed as 1/2 units to be given over 3 hours q 12 hours     1. Infectious Disease:   acyclovir   Oral Tab/Cap 400 milliGRAM(s) Oral every 8 hours  cefepime   IVPB      cefepime   IVPB 2000 milliGRAM(s) IV Intermittent every 8 hours  clotrimazole Lozenge 1 Lozenge Oral five times a day  fluconAZOLE   Tablet 400 milliGRAM(s) Oral daily  vancomycin    Solution 125 milliGRAM(s) Oral every 6 hours    2. VOD Prophylaxis: Actigall, Glutamine, Heparin (dosed at 100 units / kg / day)     3. GI Prophylaxis:   pantoprazole    Tablet 40 milliGRAM(s) Oral before breakfast    4. Mouthcare - NS / NaHCO3 rinses, Mycelex, Caphosol; Skin care     5. GVHD prophylaxis   mycophenolate mofetil 1000 milliGRAM(s) Oral <User Schedule>  tacrolimus 2 milliGRAM(s) Oral two times a day    6. Transfuse & replete electrolytes prn       7. IV hydration, daily weights, strict I&O, prn diuresis     8. PO intake as tolerated, nutrition follow up as needed, MVI, folic acid     9. Antiemetics, anti-diarrhea medications:   metoclopramide Injectable 10 milliGRAM(s) IV Push every 6 hours PRN  ondansetron Injectable 8 milliGRAM(s) IV Push every 8 hours    10. OOB as tolerated, physical therapy consult if needed     11. Monitor coags / fibrinogen 2x week, vitamin K as needed     12. Monitor closely for clinical changes, monitor for fevers     13. Emotional support provided, plan of care discussed and questions addressed     14. Patient education done regarding plan of care, restrictions and discharge planning     15. Continue regular social work input     I have written the above note for Dr. Logan who performed service with me in the room.   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    S: Patient seen and examined with HPC Transplant Team:   + mild headache   left upper arm bruise, blood blister in the mouth   Denies mouth / tongue / throat pain, dyspnea, nausea, vomiting, abdominal pain     O: Vitals:   Vital Signs Last 24 Hrs  T(C): 36.3 (2019 05:35), Max: 36.7 (2019 14:21)  T(F): 97.3 (2019 05:35), Max: 98.1 (2019 14:21)  HR: 76 (2019 05:35) (75 - 101)  BP: 138/73 (2019 05:35) (117/72 - 154/89)  BP(mean): --  RR: 18 (2019 05:35) (18 - 20)  SpO2: 98% (2019 05:35) (98% - 100%)    Admit weight: 111.4kg   Daily Weight in k.1 (2019 10:00)  Today's weight: 112.5kg     Intake / Output:    @ 07: @ 07:00  --------------------------------------------------------  IN: 2109.9 mL / OUT: 2625 mL / NET: -515.1 mL     @ 07: @ 08:26  --------------------------------------------------------  IN: 132 mL / OUT: 200 mL / NET: -68 mL        PE:   Oropharynx: no erythema or ulcers  Oral Mucositis: n/a                                                       stGstrstastdstest:st st1st CVS: S1S2, RRR  Lungs: CTA throughout bilaterally   Abdomen: soft, NT/ND, normoactive BS x 4Q  Extremities: no edema  Gastric Mucositis: n/a                                                 stGstrstastdstest:st st1st Intestinal Mucositis: n/a                                             stGstrstastdstest:st st1st Skin: no rash  TLC: C/D/I  Neuro: A&O x 3   Pain: chronic back pain       Labs:   CBC Full  -  ( 2019 07:11 )  WBC Count : 0.1 K/uL  Hemoglobin : 8.7 g/dL  Hematocrit : 26.5 %  Platelet Count - Automated : x  Mean Cell Volume : 104.0 fl  Mean Cell Hemoglobin : 34.1 pg  Mean Cell Hemoglobin Concentration : 32.9 gm/dL  Auto Neutrophil # : x  Auto Lymphocyte # : x  Auto Monocyte # : x  Auto Eosinophil # : x  Auto Basophil # : x  Auto Neutrophil % : x  Auto Lymphocyte % : x  Auto Monocyte % : x  Auto Eosinophil % : x  Auto Basophil % : x                          8.7    0.1   )-----------( x        ( 2019 07:11 )             26.5         145  |  111<H>  |  10  ----------------------------<  81  4.2   |  25  |  0.58    Ca    9.8      2019 07:08  Phos  3.1       Mg     1.8         TPro  5.7<L>  /  Alb  3.2<L>  /  TBili  0.2  /  DBili  <0.1  /  AST  11  /  ALT  17  /  AlkPhos  146<H>      PT/INR - ( 2019 07:09 )   PT: 9.3 sec;   INR: 0.81 ratio         PTT - ( 2019 07:09 )  PTT:42.2 sec  LIVER FUNCTIONS - ( 2019 07:08 )  Alb: 3.2 g/dL / Pro: 5.7 g/dL / ALK PHOS: 146 U/L / ALT: 17 U/L / AST: 11 U/L / GGT: x           Lactate Dehydrogenase, Serum: 115 U/L ( @ 07:08)        @ 10:06  Tacrolimus 2.2                Cyclosporine --      Cultures:   Culture Results: urine  <10,000 CFU/ml Normal Urogenital everton present (19 @ 18:41)    Culture Results: blood  No growth to date. (19 @ 18:30)    Culture Results: blood   No growth to date. (19 @ 18:30)    Radiology:   EXAM:  XR CHEST PORTABLE URGENT 1V                        PROCEDURE DATE:  2019    IMPRESSION:  1.  The lungs are clear.    EXAM:  CT BRAIN                        PROCEDURE DATE:  2019    IMPRESSION:  No acute intracranial abnormality is noted. No subdural collections are   visualized. If the patient has new and persistent symptoms, consider   follow-up brain MRI with and without contrast, if there are no MRI or   contrast contraindications.  New moderate mucosal thickening involves the paranasal sinuses. Correlate   for possible sinusitis        Meds:   Antimicrobials:   acyclovir   Oral Tab/Cap 400 milliGRAM(s) Oral every 8 hours  cefepime   IVPB      cefepime   IVPB 2000 milliGRAM(s) IV Intermittent every 8 hours  clotrimazole Lozenge 1 Lozenge Oral five times a day  fluconAZOLE   Tablet 400 milliGRAM(s) Oral daily  vancomycin    Solution 125 milliGRAM(s) Oral every 6 hours      Heme / Onc:   heparin  Infusion 464 Unit(s)/Hr IV Continuous <Continuous>      GI:  docusate sodium 100 milliGRAM(s) Oral three times a day PRN  pantoprazole    Tablet 40 milliGRAM(s) Oral before breakfast  polyethylene glycol 3350 17 Gram(s) Oral daily  senna 1 Tablet(s) Oral at bedtime PRN  sodium bicarbonate Mouth Rinse 10 milliLiter(s) Swish and Spit five times a day  ursodiol Capsule 300 milliGRAM(s) Oral two times a day with meals      Cardiovascular:   losartan 100 milliGRAM(s) Oral daily      Immunologic:   filgrastim-sndz Injectable 480 MICROGram(s) SubCutaneous daily  immune   globulin 10% (GAMMAGARD) IVPB 20 Gram(s) IV Intermittent <User Schedule>  mycophenolate mofetil 1000 milliGRAM(s) Oral <User Schedule>  tacrolimus 2 milliGRAM(s) Oral every 12 hours      Other medications:   acetaminophen   Tablet .. 650 milliGRAM(s) Oral every 6 hours  acetaminophen   Tablet .. 650 milliGRAM(s) Oral <User Schedule>  benzonatate 100 milliGRAM(s) Oral every 8 hours  Biotene Dry Mouth Oral Rinse 5 milliLiter(s) Swish and Spit five times a day  diphenhydrAMINE   Injectable 25 milliGRAM(s) IV Push every 3 weeks  folic acid 1 milliGRAM(s) Oral daily  levETIRAcetam 500 milliGRAM(s) Oral two times a day  lidocaine/prilocaine Cream 1 Application(s) Topical daily  multivitamin 1 Tablet(s) Oral daily  nystatin Powder 1 Application(s) Topical every 8 hours  ondansetron Injectable 8 milliGRAM(s) IV Push every 8 hours  sodium chloride 0.9%. 1000 milliLiter(s) IV Continuous <Continuous>      PRN:   acetaminophen   Tablet .. 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 diff - started vancomycin 125mg po q hours   Platelet refractory - platelets dispensed as 1/2 units to be given over 3 hours q 12 hours     1. Infectious Disease:   acyclovir   Oral Tab/Cap 400 milliGRAM(s) Oral every 8 hours  cefepime   IVPB      cefepime   IVPB 2000 milliGRAM(s) IV Intermittent every 8 hours  clotrimazole Lozenge 1 Lozenge Oral five times a day  fluconAZOLE   Tablet 400 milliGRAM(s) Oral daily  vancomycin    Solution 125 milliGRAM(s) Oral every 6 hours    2. VOD Prophylaxis: Actigall, Glutamine, Heparin (dosed at 100 units / kg / day)     3. GI Prophylaxis:   pantoprazole    Tablet 40 milliGRAM(s) Oral before breakfast    4. Mouthcare - NS / NaHCO3 rinses, Mycelex, Caphosol; Skin care     5. GVHD prophylaxis   mycophenolate mofetil 1000 milliGRAM(s) Oral <User Schedule>  tacrolimus 2 milliGRAM(s) Oral two times a day    6. Transfuse & replete electrolytes prn       7. IV hydration, daily weights, strict I&O, prn diuresis     8. PO intake as tolerated, nutrition follow up as needed, MVI, folic acid     9. Antiemetics, anti-diarrhea medications:   metoclopramide Injectable 10 milliGRAM(s) IV Push every 6 hours PRN  ondansetron Injectable 8 milliGRAM(s) IV Push every 8 hours    10. OOB as tolerated, physical therapy consult if needed     11. Monitor coags / fibrinogen 2x week, vitamin K as needed     12. Monitor closely for clinical changes, monitor for fevers     13. Emotional support provided, plan of care discussed and questions addressed     14. Patient education done regarding plan of care, restrictions and discharge planning     15. Continue regular social work input     I have written the above note for Dr. Logan who performed service with me in the room.   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    S: Patient seen and examined with [facility]PC Transplant Team:   + mild headache   + occasional cough  left upper arm bruise, blood blister in the mouth   Denies mouth / tongue / throat pain, dyspnea, nausea, vomiting, abdominal pain     O: Vitals:   Vital Signs Last 24 Hrs  T(C): 36.3 (2019 05:35), Max: 36.7 (2019 14:21)  T(F): 97.3 (2019 05:35), Max: 98.1 (2019 14:21)  HR: 76 (2019 05:35) (75 - 101)  BP: 138/73 (2019 05:35) (117/72 - 154/89)  BP(mean): --  RR: 18 (2019 05:35) (18 - 20)  SpO2: 98% (2019 05:35) (98% - 100%)    Admit weight: 111.4kg   Daily Weight in k.1 (2019 10:00)  Today's weight: 112.5kg     Intake / Output:    @ 07: @ 07:00  --------------------------------------------------------  IN: 2109.9 mL / OUT: 2625 mL / NET: -515.1 mL     @ 07: @ 08:26  --------------------------------------------------------  IN: 132 mL / OUT: 200 mL / NET: -68 mL        PE:   Oropharynx: no erythema or ulcers  Oral Mucositis: n/a                                                       stGstrstastdstest:st st1st CVS: S1S2, RRR  Lungs: CTA throughout bilaterally   Abdomen: soft, NT/ND, normoactive BS x 4Q  Extremities: no edema  Gastric Mucositis: n/a                                                 stGstrstastdstest:st st1st Intestinal Mucositis: n/a                                             stGstrstastdstest:st st1st Skin: no rash  TLC: C/D/I  Neuro: A&O x 3   Pain: chronic back pain       Labs:   CBC Full  -  ( 2019 07:11 )  WBC Count : 0.1 K/uL  Hemoglobin : 8.7 g/dL  Hematocrit : 26.5 %  Platelet Count - Automated : x  Mean Cell Volume : 104.0 fl  Mean Cell Hemoglobin : 34.1 pg  Mean Cell Hemoglobin Concentration : 32.9 gm/dL  Auto Neutrophil # : x  Auto Lymphocyte # : x  Auto Monocyte # : x  Auto Eosinophil # : x  Auto Basophil # : x  Auto Neutrophil % : x  Auto Lymphocyte % : x  Auto Monocyte % : x  Auto Eosinophil % : x  Auto Basophil % : x                          8.7    0.1   )-----------( x        ( 2019 07:11 )             26.5         145  |  111<H>  |  10  ----------------------------<  81  4.2   |  25  |  0.58    Ca    9.8      2019 07:08  Phos  3.1       Mg     1.8         TPro  5.7<L>  /  Alb  3.2<L>  /  TBili  0.2  /  DBili  <0.1  /  AST  11  /  ALT  17  /  AlkPhos  146<H>      PT/INR - ( 2019 07:09 )   PT: 9.3 sec;   INR: 0.81 ratio         PTT - ( 2019 07:09 )  PTT:42.2 sec  LIVER FUNCTIONS - ( 2019 07:08 )  Alb: 3.2 g/dL / Pro: 5.7 g/dL / ALK PHOS: 146 U/L / ALT: 17 U/L / AST: 11 U/L / GGT: x           Lactate Dehydrogenase, Serum: 115 U/L ( @ 07:08)        @ 10:06  Tacrolimus 2.2                Cyclosporine --      Cultures:   Culture Results: urine  <10,000 CFU/ml Normal Urogenital everton present (19 @ 18:41)    Culture Results: blood  No growth to date. (19 @ 18:30)    Culture Results: blood   No growth to date. (19 @ 18:30)    Radiology:   EXAM:  XR CHEST PORTABLE URGENT 1V                        PROCEDURE DATE:  2019    IMPRESSION:  1.  The lungs are clear.    EXAM:  CT BRAIN                        PROCEDURE DATE:  2019    IMPRESSION:  No acute intracranial abnormality is noted. No subdural collections are   visualized. If the patient has new and persistent symptoms, consider   follow-up brain MRI with and without contrast, if there are no MRI or   contrast contraindications.  New moderate mucosal thickening involves the paranasal sinuses. Correlate   for possible sinusitis        Meds:   Antimicrobials:   acyclovir   Oral Tab/Cap 400 milliGRAM(s) Oral every 8 hours  cefepime   IVPB      cefepime   IVPB 2000 milliGRAM(s) IV Intermittent every 8 hours  clotrimazole Lozenge 1 Lozenge Oral five times a day  fluconAZOLE   Tablet 400 milliGRAM(s) Oral daily  vancomycin    Solution 125 milliGRAM(s) Oral every 6 hours      Heme / Onc:   heparin  Infusion 464 Unit(s)/Hr IV Continuous <Continuous>      GI:  docusate sodium 100 milliGRAM(s) Oral three times a day PRN  pantoprazole    Tablet 40 milliGRAM(s) Oral before breakfast  polyethylene glycol 3350 17 Gram(s) Oral daily  senna 1 Tablet(s) Oral at bedtime PRN  sodium bicarbonate Mouth Rinse 10 milliLiter(s) Swish and Spit five times a day  ursodiol Capsule 300 milliGRAM(s) Oral two times a day with meals      Cardiovascular:   losartan 100 milliGRAM(s) Oral daily      Immunologic:   filgrastim-sndz Injectable 480 MICROGram(s) SubCutaneous daily  immune   globulin 10% (GAMMAGARD) IVPB 20 Gram(s) IV Intermittent <User Schedule>  mycophenolate mofetil 1000 milliGRAM(s) Oral <User Schedule>  tacrolimus 2 milliGRAM(s) Oral every 12 hours      Other medications:   acetaminophen   Tablet .. 650 milliGRAM(s) Oral every 6 hours  acetaminophen   Tablet .. 650 milliGRAM(s) Oral <User Schedule>  benzonatate 100 milliGRAM(s) Oral every 8 hours  Biotene Dry Mouth Oral Rinse 5 milliLiter(s) Swish and Spit five times a day  diphenhydrAMINE   Injectable 25 milliGRAM(s) IV Push every 3 weeks  folic acid 1 milliGRAM(s) Oral daily  levETIRAcetam 500 milliGRAM(s) Oral two times a day  lidocaine/prilocaine Cream 1 Application(s) Topical daily  multivitamin 1 Tablet(s) Oral daily  nystatin Powder 1 Application(s) Topical every 8 hours  ondansetron Injectable 8 milliGRAM(s) IV Push every 8 hours  sodium chloride 0.9%. 1000 milliLiter(s) IV Continuous <Continuous>      PRN:   acetaminophen   Tablet .. 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 diff - started vancomycin 125mg po q hours   Platelet refractory - platelets dispensed as 1/2 units to be given over 3 hours q 12 hours     1. Infectious Disease:   acyclovir   Oral Tab/Cap 400 milliGRAM(s) Oral every 8 hours  cefepime   IVPB      cefepime   IVPB 2000 milliGRAM(s) IV Intermittent every 8 hours  clotrimazole Lozenge 1 Lozenge Oral five times a day  fluconAZOLE   Tablet 400 milliGRAM(s) Oral daily  vancomycin    Solution 125 milliGRAM(s) Oral every 6 hours    2. VOD Prophylaxis: Actigall, Glutamine, Heparin (dosed at 100 units / kg / day)     3. GI Prophylaxis:   pantoprazole    Tablet 40 milliGRAM(s) Oral before breakfast    4. Mouthcare - NS / NaHCO3 rinses, Mycelex, Biotene; Skin care     5. GVHD prophylaxis   mycophenolate mofetil 1000 milliGRAM(s) Oral <User Schedule>  tacrolimus 2 milliGRAM(s) Oral two times a day    6. Transfuse & replete electrolytes prn       7. IV hydration, daily weights, strict I&O, prn diuresis     8. PO intake as tolerated, nutrition follow up as needed, MVI, folic acid     9. Antiemetics, anti-diarrhea medications:   metoclopramide Injectable 10 milliGRAM(s) IV Push every 6 hours PRN  ondansetron Injectable 8 milliGRAM(s) IV Push every 8 hours    10. OOB as tolerated, physical therapy consult if needed     11. Monitor coags / fibrinogen 2x week, vitamin K as needed     12. Monitor closely for clinical changes, monitor for fevers     13. Emotional support provided, plan of care discussed and questions addressed     14. Patient education done regarding plan of care, restrictions and discharge planning     15. Continue regular social work input     I have written the above note for Dr. Logan who performed service with me in the room.   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    S: Patient seen and examined with [facility]PC Transplant Team:   + mild headache   + occasional cough  left upper arm bruise, blood blister in the mouth   Denies mouth / tongue / throat pain, dyspnea, nausea, vomiting, abdominal pain     O: Vitals:   Vital Signs Last 24 Hrs  T(C): 36.3 (2019 05:35), Max: 36.7 (2019 14:21)  T(F): 97.3 (2019 05:35), Max: 98.1 (2019 14:21)  HR: 76 (2019 05:35) (75 - 101)  BP: 138/73 (2019 05:35) (117/72 - 154/89)  BP(mean): --  RR: 18 (2019 05:35) (18 - 20)  SpO2: 98% (2019 05:35) (98% - 100%)    Admit weight: 111.4kg   Daily Weight in k.1 (2019 10:00)  Today's weight: 112.5kg     Intake / Output:    @ 07: @ 07:00  --------------------------------------------------------  IN: 2109.9 mL / OUT: 2625 mL / NET: -515.1 mL     @ 07: @ 08:26  --------------------------------------------------------  IN: 132 mL / OUT: 200 mL / NET: -68 mL        PE:   Oropharynx: no erythema or ulcers  Oral Mucositis: n/a                                                       stGstrstastdstest:st st1st CVS: S1S2, RRR  Lungs: CTA throughout bilaterally   Abdomen: soft, NT/ND, normoactive BS x 4Q  Extremities: no edema  Gastric Mucositis: n/a                                                 stGstrstastdstest:st st1st Intestinal Mucositis: n/a                                             stGstrstastdstest:st st1st Skin: no rash  TLC: C/D/I  Neuro: A&O x 3   Pain: chronic back pain       Labs:   CBC Full  -  ( 2019 07:11 )  WBC Count : 0.1 K/uL  Hemoglobin : 8.7 g/dL  Hematocrit : 26.5 %  Platelet Count - Automated : x  Mean Cell Volume : 104.0 fl  Mean Cell Hemoglobin : 34.1 pg  Mean Cell Hemoglobin Concentration : 32.9 gm/dL  Auto Neutrophil # : x  Auto Lymphocyte # : x  Auto Monocyte # : x  Auto Eosinophil # : x  Auto Basophil # : x  Auto Neutrophil % : x  Auto Lymphocyte % : x  Auto Monocyte % : x  Auto Eosinophil % : x  Auto Basophil % : x                          8.7    0.1   )-----------( x        ( 2019 07:11 )             26.5         145  |  111<H>  |  10  ----------------------------<  81  4.2   |  25  |  0.58    Ca    9.8      2019 07:08  Phos  3.1       Mg     1.8         TPro  5.7<L>  /  Alb  3.2<L>  /  TBili  0.2  /  DBili  <0.1  /  AST  11  /  ALT  17  /  AlkPhos  146<H>      PT/INR - ( 2019 07:09 )   PT: 9.3 sec;   INR: 0.81 ratio         PTT - ( 2019 07:09 )  PTT:42.2 sec  LIVER FUNCTIONS - ( 2019 07:08 )  Alb: 3.2 g/dL / Pro: 5.7 g/dL / ALK PHOS: 146 U/L / ALT: 17 U/L / AST: 11 U/L / GGT: x           Lactate Dehydrogenase, Serum: 115 U/L ( @ 07:08)        @ 10:06  Tacrolimus 2.2                Cyclosporine --      Cultures:   Culture Results: urine  <10,000 CFU/ml Normal Urogenital everton present (19 @ 18:41)    Culture Results: blood  No growth to date. (19 @ 18:30)    Culture Results: blood   No growth to date. (19 @ 18:30)    Radiology:   EXAM:  XR CHEST PORTABLE URGENT 1V                        PROCEDURE DATE:  2019    IMPRESSION:  1.  The lungs are clear.    EXAM:  CT BRAIN                        PROCEDURE DATE:  2019    IMPRESSION:  No acute intracranial abnormality is noted. No subdural collections are   visualized. If the patient has new and persistent symptoms, consider   follow-up brain MRI with and without contrast, if there are no MRI or   contrast contraindications.  New moderate mucosal thickening involves the paranasal sinuses. Correlate   for possible sinusitis        Meds:   Antimicrobials:   acyclovir   Oral Tab/Cap 400 milliGRAM(s) Oral every 8 hours  cefepime   IVPB      cefepime   IVPB 2000 milliGRAM(s) IV Intermittent every 8 hours  clotrimazole Lozenge 1 Lozenge Oral five times a day  fluconAZOLE   Tablet 400 milliGRAM(s) Oral daily  vancomycin    Solution 125 milliGRAM(s) Oral every 6 hours      Heme / Onc:   heparin  Infusion 464 Unit(s)/Hr IV Continuous <Continuous>      GI:  docusate sodium 100 milliGRAM(s) Oral three times a day PRN  pantoprazole    Tablet 40 milliGRAM(s) Oral before breakfast  polyethylene glycol 3350 17 Gram(s) Oral daily  senna 1 Tablet(s) Oral at bedtime PRN  sodium bicarbonate Mouth Rinse 10 milliLiter(s) Swish and Spit five times a day  ursodiol Capsule 300 milliGRAM(s) Oral two times a day with meals      Cardiovascular:   losartan 100 milliGRAM(s) Oral daily      Immunologic:   filgrastim-sndz Injectable 480 MICROGram(s) SubCutaneous daily  immune   globulin 10% (GAMMAGARD) IVPB 20 Gram(s) IV Intermittent <User Schedule>  mycophenolate mofetil 1000 milliGRAM(s) Oral <User Schedule>  tacrolimus 2 milliGRAM(s) Oral every 12 hours      Other medications:   acetaminophen   Tablet .. 650 milliGRAM(s) Oral every 6 hours  acetaminophen   Tablet .. 650 milliGRAM(s) Oral <User Schedule>  benzonatate 100 milliGRAM(s) Oral every 8 hours  Biotene Dry Mouth Oral Rinse 5 milliLiter(s) Swish and Spit five times a day  diphenhydrAMINE   Injectable 25 milliGRAM(s) IV Push every 3 weeks  folic acid 1 milliGRAM(s) Oral daily  levETIRAcetam 500 milliGRAM(s) Oral two times a day  lidocaine/prilocaine Cream 1 Application(s) Topical daily  multivitamin 1 Tablet(s) Oral daily  nystatin Powder 1 Application(s) Topical every 8 hours  ondansetron Injectable 8 milliGRAM(s) IV Push every 8 hours  sodium chloride 0.9%. 1000 milliLiter(s) IV Continuous <Continuous>      PRN:   acetaminophen   Tablet .. 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 diff - started vancomycin 125mg po q hours   Platelet refractory - platelets dispensed as 1/2 units to be given over 3 hours q 12 hours     1. Infectious Disease:   acyclovir   Oral Tab/Cap 400 milliGRAM(s) Oral every 8 hours  cefepime   IVPB      cefepime   IVPB 2000 milliGRAM(s) IV Intermittent every 8 hours  clotrimazole Lozenge 1 Lozenge Oral five times a day  fluconAZOLE   Tablet 400 milliGRAM(s) Oral daily  vancomycin    Solution 125 milliGRAM(s) Oral every 6 hours    2. VOD Prophylaxis: Actigall, Glutamine, Heparin (dosed at 100 units / kg / day)     3. GI Prophylaxis:   pantoprazole    Tablet 40 milliGRAM(s) Oral before breakfast    4. Mouthcare - NS / NaHCO3 rinses, Mycelex, Biotene; Skin care     5. GVHD prophylaxis   mycophenolate mofetil 1000 milliGRAM(s) Oral <User Schedule>  tacrolimus 2 milliGRAM(s) Oral two times a day    6. Transfuse & replete electrolytes prn       7. IV hydration, daily weights, strict I&O, prn diuresis     8. PO intake as tolerated, nutrition follow up as needed, MVI, folic acid     9. Antiemetics, anti-diarrhea medications:   metoclopramide Injectable 10 milliGRAM(s) IV Push every 6 hours PRN  ondansetron Injectable 8 milliGRAM(s) IV Push every 8 hours    10. OOB as tolerated, physical therapy consult if needed     11. Monitor coags / fibrinogen 2x week, vitamin K as needed     12. Monitor closely for clinical changes, monitor for fevers     13. Emotional support provided, plan of care discussed and questions addressed     14. Patient education done regarding plan of care, restrictions and discharge planning     15. Continue regular social work input     I have written the above note for Dr. Logan who performed service with me in the room.   Lola Rinaldi NP-C ([phone])    I have seen and examined the patient with NP, I agree with above note as scribed.

## 2019-02-23 LAB
ALBUMIN SERPL ELPH-MCNC: 3.1 G/DL — LOW (ref 3.3–5)
ALP SERPL-CCNC: 140 U/L — HIGH (ref 40–120)
ALT FLD-CCNC: 14 U/L — SIGNIFICANT CHANGE UP (ref 10–45)
ANION GAP SERPL CALC-SCNC: 10 MMOL/L — SIGNIFICANT CHANGE UP (ref 5–17)
AST SERPL-CCNC: 13 U/L — SIGNIFICANT CHANGE UP (ref 10–40)
BILIRUB SERPL-MCNC: 0.1 MG/DL — LOW (ref 0.2–1.2)
BUN SERPL-MCNC: 12 MG/DL — SIGNIFICANT CHANGE UP (ref 7–23)
CALCIUM SERPL-MCNC: 9.6 MG/DL — SIGNIFICANT CHANGE UP (ref 8.4–10.5)
CHLORIDE SERPL-SCNC: 109 MMOL/L — HIGH (ref 96–108)
CO2 SERPL-SCNC: 24 MMOL/L — SIGNIFICANT CHANGE UP (ref 22–31)
CREAT SERPL-MCNC: 0.62 MG/DL — SIGNIFICANT CHANGE UP (ref 0.5–1.3)
GLUCOSE SERPL-MCNC: 90 MG/DL — SIGNIFICANT CHANGE UP (ref 70–99)
HCT VFR BLD CALC: 24.6 % — LOW (ref 34.5–45)
HGB BLD-MCNC: 8.2 G/DL — LOW (ref 11.5–15.5)
LDH SERPL L TO P-CCNC: 113 U/L — SIGNIFICANT CHANGE UP (ref 50–242)
MAGNESIUM SERPL-MCNC: 1.6 MG/DL — SIGNIFICANT CHANGE UP (ref 1.6–2.6)
MCHC RBC-ENTMCNC: 33.2 GM/DL — SIGNIFICANT CHANGE UP (ref 32–36)
MCHC RBC-ENTMCNC: 34.2 PG — HIGH (ref 27–34)
MCV RBC AUTO: 103 FL — HIGH (ref 80–100)
PHOSPHATE SERPL-MCNC: 2.6 MG/DL — SIGNIFICANT CHANGE UP (ref 2.5–4.5)
PLATELET # BLD AUTO: 7 K/UL — CRITICAL LOW (ref 150–400)
POTASSIUM SERPL-MCNC: 4.1 MMOL/L — SIGNIFICANT CHANGE UP (ref 3.5–5.3)
POTASSIUM SERPL-SCNC: 4.1 MMOL/L — SIGNIFICANT CHANGE UP (ref 3.5–5.3)
PROT SERPL-MCNC: 5.4 G/DL — LOW (ref 6–8.3)
RBC # BLD: 2.38 M/UL — LOW (ref 3.8–5.2)
RBC # FLD: 12 % — SIGNIFICANT CHANGE UP (ref 10.3–14.5)
SODIUM SERPL-SCNC: 143 MMOL/L — SIGNIFICANT CHANGE UP (ref 135–145)
WBC # BLD: 0.1 K/UL — CRITICAL LOW (ref 3.8–10.5)
WBC # FLD AUTO: 0.1 K/UL — CRITICAL LOW (ref 3.8–10.5)

## 2019-02-23 PROCEDURE — 99291 CRITICAL CARE FIRST HOUR: CPT

## 2019-02-23 RX ADMIN — Medication 400 MILLIGRAM(S): at 21:01

## 2019-02-23 RX ADMIN — FLUCONAZOLE 400 MILLIGRAM(S): 150 TABLET ORAL at 12:34

## 2019-02-23 RX ADMIN — LOSARTAN POTASSIUM 100 MILLIGRAM(S): 100 TABLET, FILM COATED ORAL at 05:12

## 2019-02-23 RX ADMIN — TACROLIMUS 2 MILLIGRAM(S): 5 CAPSULE ORAL at 05:12

## 2019-02-23 RX ADMIN — Medication 10 MILLILITER(S): at 23:41

## 2019-02-23 RX ADMIN — LEVETIRACETAM 500 MILLIGRAM(S): 250 TABLET, FILM COATED ORAL at 05:14

## 2019-02-23 RX ADMIN — ONDANSETRON 8 MILLIGRAM(S): 8 TABLET, FILM COATED ORAL at 05:14

## 2019-02-23 RX ADMIN — Medication 400 MILLIGRAM(S): at 14:26

## 2019-02-23 RX ADMIN — ONDANSETRON 8 MILLIGRAM(S): 8 TABLET, FILM COATED ORAL at 14:26

## 2019-02-23 RX ADMIN — Medication 10 MILLILITER(S): at 16:25

## 2019-02-23 RX ADMIN — Medication 5 MILLILITER(S): at 19:36

## 2019-02-23 RX ADMIN — HYDROMORPHONE HYDROCHLORIDE 0.5 MILLIGRAM(S): 2 INJECTION INTRAMUSCULAR; INTRAVENOUS; SUBCUTANEOUS at 22:45

## 2019-02-23 RX ADMIN — Medication 10 MILLILITER(S): at 12:34

## 2019-02-23 RX ADMIN — ONDANSETRON 8 MILLIGRAM(S): 8 TABLET, FILM COATED ORAL at 21:01

## 2019-02-23 RX ADMIN — Medication 100 MILLIGRAM(S): at 14:26

## 2019-02-23 RX ADMIN — Medication 400 MILLIGRAM(S): at 05:12

## 2019-02-23 RX ADMIN — PANTOPRAZOLE SODIUM 40 MILLIGRAM(S): 20 TABLET, DELAYED RELEASE ORAL at 05:15

## 2019-02-23 RX ADMIN — TACROLIMUS 2 MILLIGRAM(S): 5 CAPSULE ORAL at 18:19

## 2019-02-23 RX ADMIN — Medication 1 LOZENGE: at 07:54

## 2019-02-23 RX ADMIN — Medication 1 LOZENGE: at 16:25

## 2019-02-23 RX ADMIN — Medication 5 MILLILITER(S): at 16:25

## 2019-02-23 RX ADMIN — Medication 100 MILLIGRAM(S): at 21:01

## 2019-02-23 RX ADMIN — SODIUM CHLORIDE 20 MILLILITER(S): 9 INJECTION INTRAMUSCULAR; INTRAVENOUS; SUBCUTANEOUS at 23:41

## 2019-02-23 RX ADMIN — HYDROMORPHONE HYDROCHLORIDE 0.5 MILLIGRAM(S): 2 INJECTION INTRAMUSCULAR; INTRAVENOUS; SUBCUTANEOUS at 02:00

## 2019-02-23 RX ADMIN — Medication 1 LOZENGE: at 02:00

## 2019-02-23 RX ADMIN — HYDROMORPHONE HYDROCHLORIDE 0.5 MILLIGRAM(S): 2 INJECTION INTRAMUSCULAR; INTRAVENOUS; SUBCUTANEOUS at 22:28

## 2019-02-23 RX ADMIN — NYSTATIN CREAM 1 APPLICATION(S): 100000 CREAM TOPICAL at 05:15

## 2019-02-23 RX ADMIN — Medication 10 MILLILITER(S): at 07:54

## 2019-02-23 RX ADMIN — Medication 125 MILLIGRAM(S): at 18:20

## 2019-02-23 RX ADMIN — NYSTATIN CREAM 1 APPLICATION(S): 100000 CREAM TOPICAL at 14:28

## 2019-02-23 RX ADMIN — HYDROMORPHONE HYDROCHLORIDE 0.5 MILLIGRAM(S): 2 INJECTION INTRAMUSCULAR; INTRAVENOUS; SUBCUTANEOUS at 08:10

## 2019-02-23 RX ADMIN — Medication 5 MILLILITER(S): at 02:00

## 2019-02-23 RX ADMIN — Medication 1 LOZENGE: at 12:34

## 2019-02-23 RX ADMIN — Medication 480 MICROGRAM(S): at 18:20

## 2019-02-23 RX ADMIN — Medication 1 MILLIGRAM(S): at 12:34

## 2019-02-23 RX ADMIN — NYSTATIN CREAM 1 APPLICATION(S): 100000 CREAM TOPICAL at 21:01

## 2019-02-23 RX ADMIN — Medication 1 TABLET(S): at 12:35

## 2019-02-23 RX ADMIN — HYDROMORPHONE HYDROCHLORIDE 0.5 MILLIGRAM(S): 2 INJECTION INTRAMUSCULAR; INTRAVENOUS; SUBCUTANEOUS at 01:45

## 2019-02-23 RX ADMIN — HYDROMORPHONE HYDROCHLORIDE 0.5 MILLIGRAM(S): 2 INJECTION INTRAMUSCULAR; INTRAVENOUS; SUBCUTANEOUS at 18:18

## 2019-02-23 RX ADMIN — Medication 10 MILLILITER(S): at 19:36

## 2019-02-23 RX ADMIN — Medication 125 MILLIGRAM(S): at 12:36

## 2019-02-23 RX ADMIN — Medication 125 MILLIGRAM(S): at 23:41

## 2019-02-23 RX ADMIN — CEFEPIME 100 MILLIGRAM(S): 1 INJECTION, POWDER, FOR SOLUTION INTRAMUSCULAR; INTRAVENOUS at 14:26

## 2019-02-23 RX ADMIN — Medication 5 MILLILITER(S): at 12:34

## 2019-02-23 RX ADMIN — LEVETIRACETAM 500 MILLIGRAM(S): 250 TABLET, FILM COATED ORAL at 18:19

## 2019-02-23 RX ADMIN — Medication 1 LOZENGE: at 23:41

## 2019-02-23 RX ADMIN — CEFEPIME 100 MILLIGRAM(S): 1 INJECTION, POWDER, FOR SOLUTION INTRAMUSCULAR; INTRAVENOUS at 21:01

## 2019-02-23 RX ADMIN — URSODIOL 300 MILLIGRAM(S): 250 TABLET, FILM COATED ORAL at 07:53

## 2019-02-23 RX ADMIN — MYCOPHENOLATE MOFETIL 1000 MILLIGRAM(S): 250 CAPSULE ORAL at 05:12

## 2019-02-23 RX ADMIN — HYDROMORPHONE HYDROCHLORIDE 0.5 MILLIGRAM(S): 2 INJECTION INTRAMUSCULAR; INTRAVENOUS; SUBCUTANEOUS at 07:40

## 2019-02-23 RX ADMIN — CEFEPIME 100 MILLIGRAM(S): 1 INJECTION, POWDER, FOR SOLUTION INTRAMUSCULAR; INTRAVENOUS at 05:11

## 2019-02-23 RX ADMIN — HYDROMORPHONE HYDROCHLORIDE 0.5 MILLIGRAM(S): 2 INJECTION INTRAMUSCULAR; INTRAVENOUS; SUBCUTANEOUS at 13:35

## 2019-02-23 RX ADMIN — Medication 125 MILLIGRAM(S): at 00:01

## 2019-02-23 RX ADMIN — Medication 5 MILLILITER(S): at 23:41

## 2019-02-23 RX ADMIN — MYCOPHENOLATE MOFETIL 1000 MILLIGRAM(S): 250 CAPSULE ORAL at 14:26

## 2019-02-23 RX ADMIN — Medication 100 MILLIGRAM(S): at 05:12

## 2019-02-23 RX ADMIN — MYCOPHENOLATE MOFETIL 1000 MILLIGRAM(S): 250 CAPSULE ORAL at 21:01

## 2019-02-23 RX ADMIN — Medication 125 MILLIGRAM(S): at 05:11

## 2019-02-23 RX ADMIN — HYDROMORPHONE HYDROCHLORIDE 0.5 MILLIGRAM(S): 2 INJECTION INTRAMUSCULAR; INTRAVENOUS; SUBCUTANEOUS at 13:05

## 2019-02-23 RX ADMIN — HYDROMORPHONE HYDROCHLORIDE 0.5 MILLIGRAM(S): 2 INJECTION INTRAMUSCULAR; INTRAVENOUS; SUBCUTANEOUS at 18:45

## 2019-02-23 RX ADMIN — Medication 25 MILLIGRAM(S): at 20:33

## 2019-02-23 RX ADMIN — URSODIOL 300 MILLIGRAM(S): 250 TABLET, FILM COATED ORAL at 18:19

## 2019-02-23 RX ADMIN — Medication 10 MILLILITER(S): at 02:00

## 2019-02-23 RX ADMIN — Medication 5 MILLILITER(S): at 07:53

## 2019-02-23 RX ADMIN — Medication 1 LOZENGE: at 19:36

## 2019-02-23 RX ADMIN — HEPARIN SODIUM 4.64 UNIT(S)/HR: 5000 INJECTION INTRAVENOUS; SUBCUTANEOUS at 23:41

## 2019-02-23 NOTE — PROGRESS NOTE ADULT - SUBJECTIVE AND OBJECTIVE BOX
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    S: Patient seen and examined with HPC Transplant Team:   + mild headache   + occasional cough  left upper arm bruise, blood blister in the mouth   Denies mouth / tongue / throat pain, dyspnea, nausea, vomiting, abdominal pain     O: Vitals:   Vital Signs Last 24 Hrs  T(C): 36.2 (23 Feb 2019 05:42), Max: 36.8 (22 Feb 2019 09:00)  T(F): 97.2 (23 Feb 2019 05:42), Max: 98.2 (22 Feb 2019 09:00)  HR: 84 (23 Feb 2019 05:42) (71 - 89)  BP: 142/88 (23 Feb 2019 05:42) (115/79 - 154/83)  BP(mean): --  RR: 18 (23 Feb 2019 05:42) (18 - 18)  SpO2: 100% (23 Feb 2019 05:42) (99% - 100%)    Admit weight: 111.4kg   Daily Weight in kG:     Intake / Output:   02-22 @ 07:01  -  02-23 @ 07:00  --------------------------------------------------------  IN: 1797.6 mL / OUT: 3800 mL / NET: -2002.4 mL      PE:   Oropharynx: no erythema or ulcers  Oral Mucositis: n/a                                                       stGstrstastdstest:st st1st CVS: S1S2, RRR  Lungs: CTA throughout bilaterally   Abdomen: soft, NT/ND, normoactive BS x 4Q  Extremities: no edema  Gastric Mucositis: n/a                                                 stGstrstastdstest:st st1st Intestinal Mucositis: n/a                                             stGstrstastdstest:st st1st Skin: no rash  TLC: C/D/I  Neuro: A&O x 3   Pain: chronic back pain       Labs:   CBC Full  -  ( 22 Feb 2019 07:11 )  WBC Count : 0.1 K/uL  Hemoglobin : 8.7 g/dL  Hematocrit : 26.5 %  Platelet Count - Automated : 3 K/uL  Mean Cell Volume : 104.0 fl  Mean Cell Hemoglobin : 34.1 pg  Mean Cell Hemoglobin Concentration : 32.9 gm/dL  Auto Neutrophil # : x  Auto Lymphocyte # : x  Auto Monocyte # : x  Auto Eosinophil # : x  Auto Basophil # : x  Auto Neutrophil % : x  Auto Lymphocyte % : x  Auto Monocyte % : x  Auto Eosinophil % : x  Auto Basophil % : x                            Meds:   Antimicrobials:   acyclovir   Oral Tab/Cap 400 milliGRAM(s) Oral every 8 hours  cefepime   IVPB      cefepime   IVPB 2000 milliGRAM(s) IV Intermittent every 8 hours  clotrimazole Lozenge 1 Lozenge Oral five times a day  fluconAZOLE   Tablet 400 milliGRAM(s) Oral daily  vancomycin    Solution 125 milliGRAM(s) Oral every 6 hours      Heme / Onc:   heparin  Infusion 464 Unit(s)/Hr IV Continuous <Continuous>      GI:  docusate sodium 100 milliGRAM(s) Oral three times a day PRN  pantoprazole    Tablet 40 milliGRAM(s) Oral before breakfast  polyethylene glycol 3350 17 Gram(s) Oral daily  senna 1 Tablet(s) Oral at bedtime PRN  sodium bicarbonate Mouth Rinse 10 milliLiter(s) Swish and Spit five times a day  ursodiol Capsule 300 milliGRAM(s) Oral two times a day with meals      Cardiovascular:   losartan 100 milliGRAM(s) Oral daily      Immunologic:   filgrastim-sndz Injectable 480 MICROGram(s) SubCutaneous daily  immune   globulin 10% (GAMMAGARD) IVPB 20 Gram(s) IV Intermittent <User Schedule>  mycophenolate mofetil 1000 milliGRAM(s) Oral <User Schedule>  tacrolimus 2 milliGRAM(s) Oral every 12 hours      Other medications:   acetaminophen   Tablet .. 650 milliGRAM(s) Oral every 6 hours  acetaminophen   Tablet .. 650 milliGRAM(s) Oral <User Schedule>  benzonatate 100 milliGRAM(s) Oral every 8 hours  Biotene Dry Mouth Oral Rinse 5 milliLiter(s) Swish and Spit five times a day  diphenhydrAMINE   Injectable 25 milliGRAM(s) IV Push every 3 weeks  folic acid 1 milliGRAM(s) Oral daily  levETIRAcetam 500 milliGRAM(s) Oral two times a day  lidocaine/prilocaine Cream 1 Application(s) Topical daily  multivitamin 1 Tablet(s) Oral daily  nystatin Powder 1 Application(s) Topical every 8 hours  ondansetron Injectable 8 milliGRAM(s) IV Push every 8 hours  sodium chloride 0.9%. 1000 milliLiter(s) IV Continuous <Continuous>      PRN:   acetaminophen   Tablet .. 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 diff - started vancomycin 125mg po q hours   Platelet refractory - platelets dispensed as 1/2 units to be given over 3 hours q 12 hours     1. Infectious Disease:   acyclovir   Oral Tab/Cap 400 milliGRAM(s) Oral every 8 hours  cefepime   IVPB      cefepime   IVPB 2000 milliGRAM(s) IV Intermittent every 8 hours  clotrimazole Lozenge 1 Lozenge Oral five times a day  fluconAZOLE   Tablet 400 milliGRAM(s) Oral daily  vancomycin    Solution 125 milliGRAM(s) Oral every 6 hours    2. VOD Prophylaxis: Actigall, Glutamine, Heparin (dosed at 100 units / kg / day)     3. GI Prophylaxis:   pantoprazole    Tablet 40 milliGRAM(s) Oral before breakfast    4. Mouthcare - NS / NaHCO3 rinses, Mycelex, Biotene; Skin care     5. GVHD prophylaxis   mycophenolate mofetil 1000 milliGRAM(s) Oral <User Schedule>  tacrolimus 2 milliGRAM(s) Oral two times a day    6. Transfuse & replete electrolytes prn       7. IV hydration, daily weights, strict I&O, prn diuresis     8. PO intake as tolerated, nutrition follow up as needed, MVI, folic acid     9. Antiemetics, anti-diarrhea medications:   metoclopramide Injectable 10 milliGRAM(s) IV Push every 6 hours PRN  ondansetron Injectable 8 milliGRAM(s) IV Push every 8 hours    10. OOB as tolerated, physical therapy consult if needed     11. Monitor coags / fibrinogen 2x week, vitamin K as needed     12. Monitor closely for clinical changes, monitor for fevers     13. Emotional support provided, plan of care discussed and questions addressed     14. Patient education done regarding plan of care, restrictions and discharge planning     15. Continue regular social work input     I have written the above note for Dr. Logan who performed service with me in the room.   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    S: Patient seen and examined with HPC Transplant Team:   + mild headache   + occasional cough  left upper arm bruise, blood blister in the mouth   Denies mouth / tongue / throat pain, dyspnea, nausea, vomiting, abdominal pain     O: Vitals:   Vital Signs Last 24 Hrs  T(C): 36.2 (23 Feb 2019 05:42), Max: 36.8 (22 Feb 2019 09:00)  T(F): 97.2 (23 Feb 2019 05:42), Max: 98.2 (22 Feb 2019 09:00)  HR: 84 (23 Feb 2019 05:42) (71 - 89)  BP: 142/88 (23 Feb 2019 05:42) (115/79 - 154/83)  BP(mean): --  RR: 18 (23 Feb 2019 05:42) (18 - 18)  SpO2: 100% (23 Feb 2019 05:42) (99% - 100%)    Admit weight: 111.4kg   Daily Weight in kG:     Intake / Output:   02-22 @ 07:01  -  02-23 @ 07:00  --------------------------------------------------------  IN: 1797.6 mL / OUT: 3800 mL / NET: -2002.4 mL      PE:   Oropharynx: no erythema or ulcers  Oral Mucositis: n/a                                                       stGstrstastdstest:st st1st CVS: S1S2, RRR  Lungs: CTA throughout bilaterally   Abdomen: soft, NT/ND, normoactive BS x 4Q  Extremities: no edema  Gastric Mucositis: n/a                                                 stGstrstastdstest:st st1st Intestinal Mucositis: n/a                                             stGstrstastdstest:st st1st Skin: no rash  TLC: C/D/I  Neuro: A&O x 3   Pain: chronic back pain       Labs:   CBC Full  -  ( 22 Feb 2019 07:11 )  WBC Count : 0.1 K/uL  Hemoglobin : 8.7 g/dL  Hematocrit : 26.5 %  Platelet Count - Automated : 3 K/uL  Mean Cell Volume : 104.0 fl  Mean Cell Hemoglobin : 34.1 pg  Mean Cell Hemoglobin Concentration : 32.9 gm/dL  Auto Neutrophil # : x  Auto Lymphocyte # : x  Auto Monocyte # : x  Auto Eosinophil # : x  Auto Basophil # : x  Auto Neutrophil % : x  Auto Lymphocyte % : x  Auto Monocyte % : x  Auto Eosinophil % : x  Auto Basophil % : x                            Meds:   Antimicrobials:   acyclovir   Oral Tab/Cap 400 milliGRAM(s) Oral every 8 hours  cefepime   IVPB      cefepime   IVPB 2000 milliGRAM(s) IV Intermittent every 8 hours  clotrimazole Lozenge 1 Lozenge Oral five times a day  fluconAZOLE   Tablet 400 milliGRAM(s) Oral daily  vancomycin    Solution 125 milliGRAM(s) Oral every 6 hours      Heme / Onc:   heparin  Infusion 464 Unit(s)/Hr IV Continuous <Continuous>      GI:  docusate sodium 100 milliGRAM(s) Oral three times a day PRN  pantoprazole    Tablet 40 milliGRAM(s) Oral before breakfast  polyethylene glycol 3350 17 Gram(s) Oral daily  senna 1 Tablet(s) Oral at bedtime PRN  sodium bicarbonate Mouth Rinse 10 milliLiter(s) Swish and Spit five times a day  ursodiol Capsule 300 milliGRAM(s) Oral two times a day with meals      Cardiovascular:   losartan 100 milliGRAM(s) Oral daily      Immunologic:   filgrastim-sndz Injectable 480 MICROGram(s) SubCutaneous daily  immune   globulin 10% (GAMMAGARD) IVPB 20 Gram(s) IV Intermittent <User Schedule>  mycophenolate mofetil 1000 milliGRAM(s) Oral <User Schedule>  tacrolimus 2 milliGRAM(s) Oral every 12 hours      Other medications:   acetaminophen   Tablet .. 650 milliGRAM(s) Oral every 6 hours  acetaminophen   Tablet .. 650 milliGRAM(s) Oral <User Schedule>  benzonatate 100 milliGRAM(s) Oral every 8 hours  Biotene Dry Mouth Oral Rinse 5 milliLiter(s) Swish and Spit five times a day  diphenhydrAMINE   Injectable 25 milliGRAM(s) IV Push every 3 weeks  folic acid 1 milliGRAM(s) Oral daily  levETIRAcetam 500 milliGRAM(s) Oral two times a day  lidocaine/prilocaine Cream 1 Application(s) Topical daily  multivitamin 1 Tablet(s) Oral daily  nystatin Powder 1 Application(s) Topical every 8 hours  ondansetron Injectable 8 milliGRAM(s) IV Push every 8 hours  sodium chloride 0.9%. 1000 milliLiter(s) IV Continuous <Continuous>      PRN:   acetaminophen   Tablet .. 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 diff - started vancomycin 125mg po q hours   Platelet refractory - platelets dispensed as 1/2 units to be given over 3 hours q 12 hours     1. Infectious Disease:   acyclovir   Oral Tab/Cap 400 milliGRAM(s) Oral every 8 hours  cefepime   IVPB      cefepime   IVPB 2000 milliGRAM(s) IV Intermittent every 8 hours  clotrimazole Lozenge 1 Lozenge Oral five times a day  fluconAZOLE   Tablet 400 milliGRAM(s) Oral daily  vancomycin    Solution 125 milliGRAM(s) Oral every 6 hours    2. VOD Prophylaxis: Actigall, Glutamine, Heparin (dosed at 100 units / kg / day)     3. GI Prophylaxis:   pantoprazole    Tablet 40 milliGRAM(s) Oral before breakfast    4. Mouthcare - NS / NaHCO3 rinses, Mycelex, Biotene; Skin care     5. GVHD prophylaxis   mycophenolate mofetil 1000 milliGRAM(s) Oral <User Schedule>  tacrolimus 2 milliGRAM(s) Oral two times a day    6. Transfuse & replete electrolytes prn       7. IV hydration, daily weights, strict I&O, prn diuresis     8. PO intake as tolerated, nutrition follow up as needed, MVI, folic acid     9. Antiemetics, anti-diarrhea medications:   metoclopramide Injectable 10 milliGRAM(s) IV Push every 6 hours PRN  ondansetron Injectable 8 milliGRAM(s) IV Push every 8 hours    10. OOB as tolerated, physical therapy consult if needed     11. Monitor coags / fibrinogen 2x week, vitamin K as needed     12. Monitor closely for clinical changes, monitor for fevers     13. Emotional support provided, plan of care discussed and questions addressed     14. Patient education done regarding plan of care, restrictions and discharge planning     15. Continue regular social work input     I have written the above note for Dr. Navarrete who performed service with me in the room.   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    S: Patient seen and examined with HPC Transplant Team:   + mild headache   + occasional cough  left upper arm bruise, blood blister in the mouth   Denies mouth / tongue / throat pain, dyspnea, nausea, vomiting, abdominal pain     O: Vitals:   Vital Signs Last 24 Hrs  T(C): 36.2 (23 Feb 2019 05:42), Max: 36.8 (22 Feb 2019 09:00)  T(F): 97.2 (23 Feb 2019 05:42), Max: 98.2 (22 Feb 2019 09:00)  HR: 84 (23 Feb 2019 05:42) (71 - 89)  BP: 142/88 (23 Feb 2019 05:42) (115/79 - 154/83)  BP(mean): --  RR: 18 (23 Feb 2019 05:42) (18 - 18)  SpO2: 100% (23 Feb 2019 05:42) (99% - 100%)    Admit weight: 111.4kg   Daily Weight in kG:     Intake / Output:   02-22 @ 07:01  -  02-23 @ 07:00  --------------------------------------------------------  IN: 1797.6 mL / OUT: 3800 mL / NET: -2002.4 mL      PE:   Oropharynx: + oral ulcer healing  Oral Mucositis: n/a                                                       rdGrdrrdarddrderd:rd rd3rd CVS: S1S2, RRR  Lungs: CTA throughout bilaterally   Abdomen: soft, NT/ND, normoactive BS x 4Q  Extremities: no edema  Gastric Mucositis: n/a                                                 stGstrstastdstest:st st1st Intestinal Mucositis: n/a                                             stGstrstastdstest:st st1st Skin: no rash  TLC: C/D/I  Neuro: A&O x 3   Pain: chronic back pain       Labs:   CBC Full  -  ( 22 Feb 2019 07:11 )  WBC Count : 0.1 K/uL  Hemoglobin : 8.7 g/dL  Hematocrit : 26.5 %  Platelet Count - Automated : 3 K/uL  Mean Cell Volume : 104.0 fl  Mean Cell Hemoglobin : 34.1 pg  Mean Cell Hemoglobin Concentration : 32.9 gm/dL  Auto Neutrophil # : x  Auto Lymphocyte # : x  Auto Monocyte # : x  Auto Eosinophil # : x  Auto Basophil # : x  Auto Neutrophil % : x  Auto Lymphocyte % : x  Auto Monocyte % : x  Auto Eosinophil % : x  Auto Basophil % : x                            Meds:   Antimicrobials:   acyclovir   Oral Tab/Cap 400 milliGRAM(s) Oral every 8 hours  cefepime   IVPB      cefepime   IVPB 2000 milliGRAM(s) IV Intermittent every 8 hours  clotrimazole Lozenge 1 Lozenge Oral five times a day  fluconAZOLE   Tablet 400 milliGRAM(s) Oral daily  vancomycin    Solution 125 milliGRAM(s) Oral every 6 hours      Heme / Onc:   heparin  Infusion 464 Unit(s)/Hr IV Continuous <Continuous>      GI:  docusate sodium 100 milliGRAM(s) Oral three times a day PRN  pantoprazole    Tablet 40 milliGRAM(s) Oral before breakfast  polyethylene glycol 3350 17 Gram(s) Oral daily  senna 1 Tablet(s) Oral at bedtime PRN  sodium bicarbonate Mouth Rinse 10 milliLiter(s) Swish and Spit five times a day  ursodiol Capsule 300 milliGRAM(s) Oral two times a day with meals      Cardiovascular:   losartan 100 milliGRAM(s) Oral daily      Immunologic:   filgrastim-sndz Injectable 480 MICROGram(s) SubCutaneous daily  immune   globulin 10% (GAMMAGARD) IVPB 20 Gram(s) IV Intermittent <User Schedule>  mycophenolate mofetil 1000 milliGRAM(s) Oral <User Schedule>  tacrolimus 2 milliGRAM(s) Oral every 12 hours      Other medications:   acetaminophen   Tablet .. 650 milliGRAM(s) Oral every 6 hours  acetaminophen   Tablet .. 650 milliGRAM(s) Oral <User Schedule>  benzonatate 100 milliGRAM(s) Oral every 8 hours  Biotene Dry Mouth Oral Rinse 5 milliLiter(s) Swish and Spit five times a day  diphenhydrAMINE   Injectable 25 milliGRAM(s) IV Push every 3 weeks  folic acid 1 milliGRAM(s) Oral daily  levETIRAcetam 500 milliGRAM(s) Oral two times a day  lidocaine/prilocaine Cream 1 Application(s) Topical daily  multivitamin 1 Tablet(s) Oral daily  nystatin Powder 1 Application(s) Topical every 8 hours  ondansetron Injectable 8 milliGRAM(s) IV Push every 8 hours  sodium chloride 0.9%. 1000 milliLiter(s) IV Continuous <Continuous>      PRN:   acetaminophen   Tablet .. 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 diff - started vancomycin 125mg po q hours   Platelet refractory - platelets dispensed as 1/2 units to be given over 3 hours q 12 hours     1. Infectious Disease:   acyclovir   Oral Tab/Cap 400 milliGRAM(s) Oral every 8 hours  cefepime   IVPB      cefepime   IVPB 2000 milliGRAM(s) IV Intermittent every 8 hours  clotrimazole Lozenge 1 Lozenge Oral five times a day  fluconAZOLE   Tablet 400 milliGRAM(s) Oral daily  vancomycin    Solution 125 milliGRAM(s) Oral every 6 hours    2. VOD Prophylaxis: Actigall, Glutamine, Heparin (dosed at 100 units / kg / day)     3. GI Prophylaxis:   pantoprazole    Tablet 40 milliGRAM(s) Oral before breakfast    4. Mouthcare - NS / NaHCO3 rinses, Mycelex, Biotene; Skin care     5. GVHD prophylaxis   mycophenolate mofetil 1000 milliGRAM(s) Oral <User Schedule>  tacrolimus 2 milliGRAM(s) Oral two times a day    6. Transfuse & replete electrolytes prn       7. IV hydration, daily weights, strict I&O, prn diuresis     8. PO intake as tolerated, nutrition follow up as needed, MVI, folic acid     9. Antiemetics, anti-diarrhea medications:   metoclopramide Injectable 10 milliGRAM(s) IV Push every 6 hours PRN  ondansetron Injectable 8 milliGRAM(s) IV Push every 8 hours    10. OOB as tolerated, physical therapy consult if needed     11. Monitor coags / fibrinogen 2x week, vitamin K as needed     12. Monitor closely for clinical changes, monitor for fevers     13. Emotional support provided, plan of care discussed and questions addressed     14. Patient education done regarding plan of care, restrictions and discharge planning     15. Continue regular social work input     I have written the above note for Dr. Navarrete who performed service with me in the room.   Nataliya Martinez NP 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    S: Patient seen and examined with HPC Transplant Team:   + loose stools  left upper arm bruise, blood blister in the mouth   Denies mouth / tongue / throat pain, dyspnea, nausea, vomiting, abdominal pain     O: Vitals:   Vital Signs Last 24 Hrs  T(C): 36.2 (2019 05:42), Max: 36.8 (2019 09:00)  T(F): 97.2 (2019 05:42), Max: 98.2 (2019 09:00)  HR: 84 (2019 05:42) (71 - 89)  BP: 142/88 (2019 05:42) (115/79 - 154/83)  BP(mean): --  RR: 18 (2019 05:42) (18 - 18)  SpO2: 100% (2019 05:42) (99% - 100%)    Admit weight: 111.4kg   Daily Weight in k.4kg    Intake / Output:    @ 07:01  -  -23 @ 07:00  --------------------------------------------------------  IN: 1797.6 mL / OUT: 3800 mL / NET: -2002.4 mL      PE:   Oropharynx: + oral ulcer healing  Oral Mucositis: n/a                                                       stGstrstastdstest:st st1st CVS: S1S2, RRR  Lungs: CTA throughout bilaterally   Abdomen: soft, NT/ND, normoactive BS x 4Q  Extremities: no edema  Gastric Mucositis: n/a                                                 stGstrstastdstest:st st1st Intestinal Mucositis: n/a                                             stGstrstastdstest:st st1st Skin: no rash  TLC: C/D/I  Neuro: A&O x 3   Pain: chronic back pain       Labs:   CBC Full  -  ( 2019 07:11 )  WBC Count : 0.1 K/uL  Hemoglobin : 8.7 g/dL  Hematocrit : 26.5 %  Platelet Count - Automated : 3 K/uL  Mean Cell Volume : 104.0 fl  Mean Cell Hemoglobin : 34.1 pg  Mean Cell Hemoglobin Concentration : 32.9 gm/dL  Auto Neutrophil # : x  Auto Lymphocyte # : x  Auto Monocyte # : x  Auto Eosinophil # : x  Auto Basophil # : x  Auto Neutrophil % : x  Auto Lymphocyte % : x  Auto Monocyte % : x  Auto Eosinophil % : x  Auto Basophil % : x                            Meds:   Antimicrobials:   acyclovir   Oral Tab/Cap 400 milliGRAM(s) Oral every 8 hours  cefepime   IVPB      cefepime   IVPB 2000 milliGRAM(s) IV Intermittent every 8 hours  clotrimazole Lozenge 1 Lozenge Oral five times a day  fluconAZOLE   Tablet 400 milliGRAM(s) Oral daily  vancomycin    Solution 125 milliGRAM(s) Oral every 6 hours      Heme / Onc:   heparin  Infusion 464 Unit(s)/Hr IV Continuous <Continuous>      GI:  docusate sodium 100 milliGRAM(s) Oral three times a day PRN  pantoprazole    Tablet 40 milliGRAM(s) Oral before breakfast  polyethylene glycol 3350 17 Gram(s) Oral daily  senna 1 Tablet(s) Oral at bedtime PRN  sodium bicarbonate Mouth Rinse 10 milliLiter(s) Swish and Spit five times a day  ursodiol Capsule 300 milliGRAM(s) Oral two times a day with meals      Cardiovascular:   losartan 100 milliGRAM(s) Oral daily      Immunologic:   filgrastim-sndz Injectable 480 MICROGram(s) SubCutaneous daily  immune   globulin 10% (GAMMAGARD) IVPB 20 Gram(s) IV Intermittent <User Schedule>  mycophenolate mofetil 1000 milliGRAM(s) Oral <User Schedule>  tacrolimus 2 milliGRAM(s) Oral every 12 hours      Other medications:   acetaminophen   Tablet .. 650 milliGRAM(s) Oral every 6 hours  acetaminophen   Tablet .. 650 milliGRAM(s) Oral <User Schedule>  benzonatate 100 milliGRAM(s) Oral every 8 hours  Biotene Dry Mouth Oral Rinse 5 milliLiter(s) Swish and Spit five times a day  diphenhydrAMINE   Injectable 25 milliGRAM(s) IV Push every 3 weeks  folic acid 1 milliGRAM(s) Oral daily  levETIRAcetam 500 milliGRAM(s) Oral two times a day  lidocaine/prilocaine Cream 1 Application(s) Topical daily  multivitamin 1 Tablet(s) Oral daily  nystatin Powder 1 Application(s) Topical every 8 hours  ondansetron Injectable 8 milliGRAM(s) IV Push every 8 hours  sodium chloride 0.9%. 1000 milliLiter(s) IV Continuous <Continuous>      PRN:   acetaminophen   Tablet .. 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 diff - started vancomycin 125mg po q hours   Platelet refractory - platelets dispensed as 1/2 units to be given over 3 hours q 12 hours   FU tacro level from -never sent. Will send on     1. Infectious Disease:   acyclovir   Oral Tab/Cap 400 milliGRAM(s) Oral every 8 hours  cefepime   IVPB      cefepime   IVPB 2000 milliGRAM(s) IV Intermittent every 8 hours  clotrimazole Lozenge 1 Lozenge Oral five times a day  fluconAZOLE   Tablet 400 milliGRAM(s) Oral daily  vancomycin    Solution 125 milliGRAM(s) Oral every 6 hours    2. VOD Prophylaxis: Actigall, Glutamine, Heparin (dosed at 100 units / kg / day)     3. GI Prophylaxis:   pantoprazole    Tablet 40 milliGRAM(s) Oral before breakfast    4. Mouthcare - NS / NaHCO3 rinses, Mycelex, Biotene; Skin care     5. GVHD prophylaxis   mycophenolate mofetil 1000 milliGRAM(s) Oral <User Schedule>  tacrolimus 2 milliGRAM(s) Oral two times a day    6. Transfuse & replete electrolytes prn       7. IV hydration, daily weights, strict I&O, prn diuresis     8. PO intake as tolerated, nutrition follow up as needed, MVI, folic acid     9. Antiemetics, anti-diarrhea medications:   metoclopramide Injectable 10 milliGRAM(s) IV Push every 6 hours PRN  ondansetron Injectable 8 milliGRAM(s) IV Push every 8 hours    10. OOB as tolerated, physical therapy consult if needed     11. Monitor coags / fibrinogen 2x week, vitamin K as needed     12. Monitor closely for clinical changes, monitor for fevers     13. Emotional support provided, plan of care discussed and questions addressed     14. Patient education done regarding plan of care, restrictions and discharge planning     15. Continue regular social work input     I have written the above note for Dr. Navarrete who performed service with me in the room.   Nataliya Martinez NP

## 2019-02-23 NOTE — PROGRESS NOTE ADULT - ATTENDING COMMENTS
58 year old female with PMH [place] chromosome positive ALL diagnosed in 2016 s/p R HyperCVAD X 4 cycles, IT MTX X 2, s/p autologous stem cell transplantation (12/16/16),  who presented in October 2018 with subdural hemorrhage and relapsed disease confirmed by peripheral blood flow cytometry treated with Inotuzumab X 2 cycles.  Bone marrow biopsy on 1/23/19 showed remission with FISH and PCR for BCR-ABL translocation negative on the BM specimen but peripheral blood testing on 1/31 showed .004% BCR-ABL transcript with negative FISH suggesting MRD positivity.  Patient was on Ponatinib, DC 1/31/19. LFT's improved off drug.  Now in CR2    Patient was admitted for haploidentical stem cell transplantation with fludarabine/cytoxan/TBI conditioning. She is s/p HPC transplant, day + 9  Completed high-dose Cytoxan on days +3, +4(GVHD prophylaxis); then begin Tacrolimis, Cellcept on day +5.  Begin Zarxio on day +5    Neutropenic fevers resolved, s/p haplo storm - on Cefepime, culture from 2/14, 2/16 negative.  CXR negative.  On Acyclovir, Fluconazole prophylaxis.  C. Diff diarrhea- continue oral Vancomycin  Monitor I/O's/weights- lasix as needed  VOD prophylaxis as per protocol.  Encourage nutrition, mouth care, ambulation.    History of SDH, continue Keppra. CT scan baseline 2/7/19 negative for SDH.   History of HTN continue Losartan.  OOB/ambulate. 58 year old female with PMH [place] chromosome positive ALL diagnosed in 2016 s/p R HyperCVAD X 4 cycles, IT MTX X 2, s/p autologous stem cell transplantation (12/16/16),  who presented in October 2018 with subdural hemorrhage and relapsed disease confirmed by peripheral blood flow cytometry treated with Inotuzumab X 2 cycles.  Bone marrow biopsy on 1/23/19 showed remission with FISH and PCR for BCR-ABL translocation negative on the BM specimen but peripheral blood testing on 1/31 showed .004% BCR-ABL transcript with negative FISH suggesting MRD positivity.  Patient was on Ponatinib, DC 1/31/19. LFT's improved off drug.  Now in CR2    Patient was admitted for haploidentical stem cell transplantation with fludarabine/cytoxan/TBI conditioning. She is s/p HPC transplant, day + 10  Completed high-dose Cytoxan on days +3, +4(GVHD prophylaxis); then begin Tacrolimis, Cellcept on day +5.  Begin Zarxio on day +5    Neutropenic fevers resolved, s/p haplo storm - on Cefepime, culture from 2/14, 2/16 negative.  CXR negative.  On Acyclovir, Fluconazole prophylaxis.  C. Diff diarrhea- continue oral Vancomycin  Monitor I/O's/weights- lasix as needed  VOD prophylaxis as per protocol.  Encourage nutrition, mouth care, ambulation.    History of SDH, continue Keppra. CT scan baseline 2/7/19 negative for SDH.   History of HTN continue Losartan.  OOB/ambulate.

## 2019-02-24 LAB
ALBUMIN SERPL ELPH-MCNC: 3.2 G/DL — LOW (ref 3.3–5)
ALP SERPL-CCNC: 140 U/L — HIGH (ref 40–120)
ALT FLD-CCNC: 17 U/L — SIGNIFICANT CHANGE UP (ref 10–45)
ANION GAP SERPL CALC-SCNC: 10 MMOL/L — SIGNIFICANT CHANGE UP (ref 5–17)
AST SERPL-CCNC: 14 U/L — SIGNIFICANT CHANGE UP (ref 10–40)
BILIRUB SERPL-MCNC: 0.2 MG/DL — SIGNIFICANT CHANGE UP (ref 0.2–1.2)
BUN SERPL-MCNC: 14 MG/DL — SIGNIFICANT CHANGE UP (ref 7–23)
CALCIUM SERPL-MCNC: 10.1 MG/DL — SIGNIFICANT CHANGE UP (ref 8.4–10.5)
CHLORIDE SERPL-SCNC: 108 MMOL/L — SIGNIFICANT CHANGE UP (ref 96–108)
CO2 SERPL-SCNC: 23 MMOL/L — SIGNIFICANT CHANGE UP (ref 22–31)
CREAT SERPL-MCNC: 0.61 MG/DL — SIGNIFICANT CHANGE UP (ref 0.5–1.3)
GLUCOSE SERPL-MCNC: 87 MG/DL — SIGNIFICANT CHANGE UP (ref 70–99)
HCT VFR BLD CALC: 24 % — LOW (ref 34.5–45)
HGB BLD-MCNC: 8.3 G/DL — LOW (ref 11.5–15.5)
LDH SERPL L TO P-CCNC: 114 U/L — SIGNIFICANT CHANGE UP (ref 50–242)
MAGNESIUM SERPL-MCNC: 1.5 MG/DL — LOW (ref 1.6–2.6)
MCHC RBC-ENTMCNC: 34.6 GM/DL — SIGNIFICANT CHANGE UP (ref 32–36)
MCHC RBC-ENTMCNC: 35.4 PG — HIGH (ref 27–34)
MCV RBC AUTO: 102 FL — HIGH (ref 80–100)
PHOSPHATE SERPL-MCNC: 2.9 MG/DL — SIGNIFICANT CHANGE UP (ref 2.5–4.5)
PLATELET # BLD AUTO: 8 K/UL — CRITICAL LOW (ref 150–400)
POTASSIUM SERPL-MCNC: 4.3 MMOL/L — SIGNIFICANT CHANGE UP (ref 3.5–5.3)
POTASSIUM SERPL-SCNC: 4.3 MMOL/L — SIGNIFICANT CHANGE UP (ref 3.5–5.3)
PROT SERPL-MCNC: 5.7 G/DL — LOW (ref 6–8.3)
RBC # BLD: 2.34 M/UL — LOW (ref 3.8–5.2)
RBC # FLD: 11.9 % — SIGNIFICANT CHANGE UP (ref 10.3–14.5)
SODIUM SERPL-SCNC: 141 MMOL/L — SIGNIFICANT CHANGE UP (ref 135–145)
TACROLIMUS SERPL-MCNC: 8.5 NG/ML — SIGNIFICANT CHANGE UP
WBC # BLD: 0.1 K/UL — CRITICAL LOW (ref 3.8–10.5)
WBC # FLD AUTO: 0.1 K/UL — CRITICAL LOW (ref 3.8–10.5)

## 2019-02-24 PROCEDURE — 99291 CRITICAL CARE FIRST HOUR: CPT

## 2019-02-24 RX ORDER — OXYCODONE HYDROCHLORIDE 5 MG/1
5 TABLET ORAL EVERY 6 HOURS
Qty: 0 | Refills: 0 | Status: DISCONTINUED | OUTPATIENT
Start: 2019-02-24 | End: 2019-03-01

## 2019-02-24 RX ORDER — HYDROMORPHONE HYDROCHLORIDE 2 MG/ML
1 INJECTION INTRAMUSCULAR; INTRAVENOUS; SUBCUTANEOUS EVERY 4 HOURS
Qty: 0 | Refills: 0 | Status: DISCONTINUED | OUTPATIENT
Start: 2019-02-24 | End: 2019-02-24

## 2019-02-24 RX ORDER — ACETAMINOPHEN 500 MG
650 TABLET ORAL EVERY 6 HOURS
Qty: 0 | Refills: 0 | Status: DISCONTINUED | OUTPATIENT
Start: 2019-02-24 | End: 2019-02-25

## 2019-02-24 RX ORDER — HYDROMORPHONE HYDROCHLORIDE 2 MG/ML
1 INJECTION INTRAMUSCULAR; INTRAVENOUS; SUBCUTANEOUS EVERY 4 HOURS
Qty: 0 | Refills: 0 | Status: DISCONTINUED | OUTPATIENT
Start: 2019-02-24 | End: 2019-02-26

## 2019-02-24 RX ORDER — MAGNESIUM SULFATE 500 MG/ML
2 VIAL (ML) INJECTION ONCE
Qty: 0 | Refills: 0 | Status: COMPLETED | OUTPATIENT
Start: 2019-02-24 | End: 2019-02-24

## 2019-02-24 RX ORDER — DIPHENHYDRAMINE HYDROCHLORIDE AND LIDOCAINE HYDROCHLORIDE AND ALUMINUM HYDROXIDE AND MAGNESIUM HYDRO
5 KIT
Qty: 0 | Refills: 0 | Status: DISCONTINUED | OUTPATIENT
Start: 2019-02-24 | End: 2019-03-04

## 2019-02-24 RX ADMIN — Medication 10 MILLILITER(S): at 08:21

## 2019-02-24 RX ADMIN — LEVETIRACETAM 500 MILLIGRAM(S): 250 TABLET, FILM COATED ORAL at 05:46

## 2019-02-24 RX ADMIN — Medication 100 MILLIGRAM(S): at 05:46

## 2019-02-24 RX ADMIN — Medication 400 MILLIGRAM(S): at 05:46

## 2019-02-24 RX ADMIN — CEFEPIME 100 MILLIGRAM(S): 1 INJECTION, POWDER, FOR SOLUTION INTRAMUSCULAR; INTRAVENOUS at 05:45

## 2019-02-24 RX ADMIN — TACROLIMUS 2 MILLIGRAM(S): 5 CAPSULE ORAL at 05:46

## 2019-02-24 RX ADMIN — Medication 10 MILLILITER(S): at 19:32

## 2019-02-24 RX ADMIN — Medication 5 MILLILITER(S): at 19:32

## 2019-02-24 RX ADMIN — Medication 480 MICROGRAM(S): at 14:36

## 2019-02-24 RX ADMIN — NYSTATIN CREAM 1 APPLICATION(S): 100000 CREAM TOPICAL at 21:02

## 2019-02-24 RX ADMIN — DIPHENHYDRAMINE HYDROCHLORIDE AND LIDOCAINE HYDROCHLORIDE AND ALUMINUM HYDROXIDE AND MAGNESIUM HYDRO 5 MILLILITER(S): KIT at 21:02

## 2019-02-24 RX ADMIN — ONDANSETRON 8 MILLIGRAM(S): 8 TABLET, FILM COATED ORAL at 14:38

## 2019-02-24 RX ADMIN — URSODIOL 300 MILLIGRAM(S): 250 TABLET, FILM COATED ORAL at 18:10

## 2019-02-24 RX ADMIN — URSODIOL 300 MILLIGRAM(S): 250 TABLET, FILM COATED ORAL at 08:22

## 2019-02-24 RX ADMIN — Medication 1 LOZENGE: at 12:09

## 2019-02-24 RX ADMIN — Medication 100 MILLIGRAM(S): at 14:35

## 2019-02-24 RX ADMIN — HYDROMORPHONE HYDROCHLORIDE 0.5 MILLIGRAM(S): 2 INJECTION INTRAMUSCULAR; INTRAVENOUS; SUBCUTANEOUS at 08:00

## 2019-02-24 RX ADMIN — Medication 5 MILLILITER(S): at 23:19

## 2019-02-24 RX ADMIN — Medication 10 MILLILITER(S): at 16:52

## 2019-02-24 RX ADMIN — Medication 100 MILLIGRAM(S): at 21:01

## 2019-02-24 RX ADMIN — Medication 400 MILLIGRAM(S): at 21:02

## 2019-02-24 RX ADMIN — HEPARIN SODIUM 4.64 UNIT(S)/HR: 5000 INJECTION INTRAVENOUS; SUBCUTANEOUS at 23:19

## 2019-02-24 RX ADMIN — Medication 50 GRAM(S): at 13:30

## 2019-02-24 RX ADMIN — HYDROMORPHONE HYDROCHLORIDE 1 MILLIGRAM(S): 2 INJECTION INTRAMUSCULAR; INTRAVENOUS; SUBCUTANEOUS at 22:06

## 2019-02-24 RX ADMIN — HYDROMORPHONE HYDROCHLORIDE 1 MILLIGRAM(S): 2 INJECTION INTRAMUSCULAR; INTRAVENOUS; SUBCUTANEOUS at 22:25

## 2019-02-24 RX ADMIN — Medication 5 MILLILITER(S): at 12:09

## 2019-02-24 RX ADMIN — Medication 650 MILLIGRAM(S): at 20:51

## 2019-02-24 RX ADMIN — HYDROMORPHONE HYDROCHLORIDE 0.5 MILLIGRAM(S): 2 INJECTION INTRAMUSCULAR; INTRAVENOUS; SUBCUTANEOUS at 03:35

## 2019-02-24 RX ADMIN — Medication 1 MILLIGRAM(S): at 12:10

## 2019-02-24 RX ADMIN — TACROLIMUS 2 MILLIGRAM(S): 5 CAPSULE ORAL at 18:10

## 2019-02-24 RX ADMIN — Medication 125 MILLIGRAM(S): at 18:10

## 2019-02-24 RX ADMIN — Medication 1 LOZENGE: at 16:52

## 2019-02-24 RX ADMIN — LEVETIRACETAM 500 MILLIGRAM(S): 250 TABLET, FILM COATED ORAL at 18:10

## 2019-02-24 RX ADMIN — HYDROMORPHONE HYDROCHLORIDE 1 MILLIGRAM(S): 2 INJECTION INTRAMUSCULAR; INTRAVENOUS; SUBCUTANEOUS at 19:00

## 2019-02-24 RX ADMIN — PANTOPRAZOLE SODIUM 40 MILLIGRAM(S): 20 TABLET, DELAYED RELEASE ORAL at 05:46

## 2019-02-24 RX ADMIN — ONDANSETRON 8 MILLIGRAM(S): 8 TABLET, FILM COATED ORAL at 21:02

## 2019-02-24 RX ADMIN — Medication 10 MILLILITER(S): at 12:09

## 2019-02-24 RX ADMIN — NYSTATIN CREAM 1 APPLICATION(S): 100000 CREAM TOPICAL at 14:39

## 2019-02-24 RX ADMIN — HYDROMORPHONE HYDROCHLORIDE 0.5 MILLIGRAM(S): 2 INJECTION INTRAMUSCULAR; INTRAVENOUS; SUBCUTANEOUS at 08:30

## 2019-02-24 RX ADMIN — Medication 1 LOZENGE: at 08:21

## 2019-02-24 RX ADMIN — MYCOPHENOLATE MOFETIL 1000 MILLIGRAM(S): 250 CAPSULE ORAL at 14:34

## 2019-02-24 RX ADMIN — Medication 125 MILLIGRAM(S): at 23:18

## 2019-02-24 RX ADMIN — MYCOPHENOLATE MOFETIL 1000 MILLIGRAM(S): 250 CAPSULE ORAL at 21:02

## 2019-02-24 RX ADMIN — Medication 10 MILLILITER(S): at 23:18

## 2019-02-24 RX ADMIN — LOSARTAN POTASSIUM 100 MILLIGRAM(S): 100 TABLET, FILM COATED ORAL at 05:46

## 2019-02-24 RX ADMIN — SODIUM CHLORIDE 20 MILLILITER(S): 9 INJECTION INTRAMUSCULAR; INTRAVENOUS; SUBCUTANEOUS at 23:19

## 2019-02-24 RX ADMIN — CEFEPIME 100 MILLIGRAM(S): 1 INJECTION, POWDER, FOR SOLUTION INTRAMUSCULAR; INTRAVENOUS at 21:02

## 2019-02-24 RX ADMIN — CEFEPIME 100 MILLIGRAM(S): 1 INJECTION, POWDER, FOR SOLUTION INTRAMUSCULAR; INTRAVENOUS at 14:36

## 2019-02-24 RX ADMIN — ONDANSETRON 8 MILLIGRAM(S): 8 TABLET, FILM COATED ORAL at 05:45

## 2019-02-24 RX ADMIN — Medication 125 MILLIGRAM(S): at 12:10

## 2019-02-24 RX ADMIN — Medication 1 LOZENGE: at 23:18

## 2019-02-24 RX ADMIN — Medication 5 MILLILITER(S): at 08:21

## 2019-02-24 RX ADMIN — Medication 400 MILLIGRAM(S): at 14:35

## 2019-02-24 RX ADMIN — HYDROMORPHONE HYDROCHLORIDE 1 MILLIGRAM(S): 2 INJECTION INTRAMUSCULAR; INTRAVENOUS; SUBCUTANEOUS at 18:10

## 2019-02-24 RX ADMIN — HYDROMORPHONE HYDROCHLORIDE 0.5 MILLIGRAM(S): 2 INJECTION INTRAMUSCULAR; INTRAVENOUS; SUBCUTANEOUS at 12:40

## 2019-02-24 RX ADMIN — Medication 5 MILLILITER(S): at 16:52

## 2019-02-24 RX ADMIN — HYDROMORPHONE HYDROCHLORIDE 0.5 MILLIGRAM(S): 2 INJECTION INTRAMUSCULAR; INTRAVENOUS; SUBCUTANEOUS at 03:16

## 2019-02-24 RX ADMIN — HYDROMORPHONE HYDROCHLORIDE 0.5 MILLIGRAM(S): 2 INJECTION INTRAMUSCULAR; INTRAVENOUS; SUBCUTANEOUS at 13:10

## 2019-02-24 RX ADMIN — FLUCONAZOLE 400 MILLIGRAM(S): 150 TABLET ORAL at 12:10

## 2019-02-24 RX ADMIN — NYSTATIN CREAM 1 APPLICATION(S): 100000 CREAM TOPICAL at 05:46

## 2019-02-24 RX ADMIN — Medication 25 MILLIGRAM(S): at 20:53

## 2019-02-24 RX ADMIN — MYCOPHENOLATE MOFETIL 1000 MILLIGRAM(S): 250 CAPSULE ORAL at 05:46

## 2019-02-24 RX ADMIN — Medication 1 TABLET(S): at 12:10

## 2019-02-24 RX ADMIN — Medication 1 LOZENGE: at 19:32

## 2019-02-24 RX ADMIN — Medication 125 MILLIGRAM(S): at 05:45

## 2019-02-24 NOTE — PROGRESS NOTE ADULT - ATTENDING COMMENTS
58 year old female with PMH [place] chromosome positive ALL diagnosed in 2016 s/p R HyperCVAD X 4 cycles, IT MTX X 2, s/p autologous stem cell transplantation (12/16/16),  who presented in October 2018 with subdural hemorrhage and relapsed disease confirmed by peripheral blood flow cytometry treated with Inotuzumab X 2 cycles.  Bone marrow biopsy on 1/23/19 showed remission with FISH and PCR for BCR-ABL translocation negative on the BM specimen but peripheral blood testing on 1/31 showed .004% BCR-ABL transcript with negative FISH suggesting MRD positivity.  Patient was on Ponatinib, DC 1/31/19. LFT's improved off drug.  Now in CR2    Patient was admitted for haploidentical stem cell transplantation with fludarabine/cytoxan/TBI conditioning. She is s/p HPC transplant, day + 10  Completed high-dose Cytoxan on days +3, +4(GVHD prophylaxis); then begin Tacrolimis, Cellcept on day +5.  Begin Zarxio on day +5    Neutropenic fevers resolved, s/p haplo storm - on Cefepime, culture from 2/14, 2/16 negative.  CXR negative.  On Acyclovir, Fluconazole prophylaxis.  C. Diff diarrhea- continue oral Vancomycin  Monitor I/O's/weights- lasix as needed  VOD prophylaxis as per protocol.  Encourage nutrition, mouth care, ambulation.    History of SDH, continue Keppra. CT scan baseline 2/7/19 negative for SDH.   History of HTN continue Losartan.  OOB/ambulate. 58 year old female with PMH [place] chromosome positive ALL diagnosed in 2016 s/p R HyperCVAD X 4 cycles, IT MTX X 2, s/p autologous stem cell transplantation (12/16/16),  who presented in October 2018 with subdural hemorrhage and relapsed disease confirmed by peripheral blood flow cytometry treated with Inotuzumab X 2 cycles.  Bone marrow biopsy on 1/23/19 showed remission with FISH and PCR for BCR-ABL translocation negative on the BM specimen but peripheral blood testing on 1/31 showed .004% BCR-ABL transcript with negative FISH suggesting MRD positivity.  Patient was on Ponatinib, DC 1/31/19. LFT's improved off drug.  Now in CR2    Patient was admitted for haploidentical stem cell transplantation with fludarabine/cytoxan/TBI conditioning. She is s/p HPC transplant, day + 10  Completed high-dose Cytoxan on days +3, +4(GVHD prophylaxis); then begin Tacrolimis, Cellcept on day +5.  Begin Zarxio on day +5    Neutropenic fevers resolved, s/p haplo storm - on Cefepime, culture from 2/14, 2/16 negative.  CXR negative.  On Acyclovir, Fluconazole prophylaxis.  C. Diff diarrhea- continue oral Vancomycin, diarrhea improving  Monitor I/O's/weights- lasix as needed  VOD prophylaxis as per protocol.  Encourage nutrition, mouth care, ambulation. mild nausea today, receiving antiemetics    History of SDH, continue Keppra. CT scan baseline 2/7/19 negative for SDH.   History of HTN continue Losartan.  OOB/ambulate.

## 2019-02-24 NOTE — PROGRESS NOTE ADULT - SUBJECTIVE AND OBJECTIVE BOX
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    S: Patient seen and examined with [facility] Transplant Team:   + mild headache   + occasional cough  left upper arm bruise, blood blister in the mouth   Denies mouth / tongue / throat pain, dyspnea, nausea, vomiting, abdominal pain    O: Vitals:   Vital Signs Last 24 Hrs  T(C): 36.6 (24 Feb 2019 05:45), Max: 36.8 (23 Feb 2019 14:00)  T(F): 97.9 (24 Feb 2019 05:45), Max: 98.2 (23 Feb 2019 14:00)  HR: 83 (24 Feb 2019 05:45) (81 - 89)  BP: 131/80 (24 Feb 2019 05:45) (109/71 - 155/89)  BP(mean): --  RR: 18 (24 Feb 2019 05:45) (18 - 18)  SpO2: 100% (24 Feb 2019 05:45) (98% - 100%)    Admit weight: 111.4kg       Intake / Output:   02-23 @ 07:01  -  02-24 @ 07:00  --------------------------------------------------------  IN: 2632 mL / OUT: 3300 mL / NET: -668 mL    PE:   Oropharynx: no erythema or ulcers  Oral Mucositis: n/a                                                       stGstrstastdstest:st st1st CVS: S1S2, RRR  Lungs: CTA throughout bilaterally   Abdomen: soft, NT/ND, normoactive BS x 4Q  Extremities: no edema  Gastric Mucositis: n/a                                                 stGstrstastdstest:st st1st Intestinal Mucositis: n/a                                             stGstrstastdstest:st st1st Skin: no rash  TLC: C/D/I  Neuro: A&O x 3   Pain: chronic back pain     Labs:         Meds:   Antimicrobials:   acyclovir   Oral Tab/Cap 400 milliGRAM(s) Oral every 8 hours  cefepime   IVPB      cefepime   IVPB 2000 milliGRAM(s) IV Intermittent every 8 hours  clotrimazole Lozenge 1 Lozenge Oral five times a day  fluconAZOLE   Tablet 400 milliGRAM(s) Oral daily  vancomycin    Solution 125 milliGRAM(s) Oral every 6 hours      Heme / Onc:   heparin  Infusion 464 Unit(s)/Hr IV Continuous <Continuous>      GI:  docusate sodium 100 milliGRAM(s) Oral three times a day PRN  pantoprazole    Tablet 40 milliGRAM(s) Oral before breakfast  polyethylene glycol 3350 17 Gram(s) Oral daily  senna 1 Tablet(s) Oral at bedtime PRN  sodium bicarbonate Mouth Rinse 10 milliLiter(s) Swish and Spit five times a day  ursodiol Capsule 300 milliGRAM(s) Oral two times a day with meals      Cardiovascular:   losartan 100 milliGRAM(s) Oral daily      Immunologic:   filgrastim-sndz Injectable 480 MICROGram(s) SubCutaneous daily  immune   globulin 10% (GAMMAGARD) IVPB 20 Gram(s) IV Intermittent <User Schedule>  mycophenolate mofetil 1000 milliGRAM(s) Oral <User Schedule>  tacrolimus 2 milliGRAM(s) Oral every 12 hours      Other medications:   acetaminophen   Tablet .. 650 milliGRAM(s) Oral every 6 hours  acetaminophen   Tablet .. 650 milliGRAM(s) Oral <User Schedule>  benzonatate 100 milliGRAM(s) Oral every 8 hours  Biotene Dry Mouth Oral Rinse 5 milliLiter(s) Swish and Spit five times a day  diphenhydrAMINE   Injectable 25 milliGRAM(s) IV Push every 3 weeks  folic acid 1 milliGRAM(s) Oral daily  levETIRAcetam 500 milliGRAM(s) Oral two times a day  lidocaine/prilocaine Cream 1 Application(s) Topical daily  multivitamin 1 Tablet(s) Oral daily  nystatin Powder 1 Application(s) Topical every 8 hours  ondansetron Injectable 8 milliGRAM(s) IV Push every 8 hours  sodium chloride 0.9%. 1000 milliLiter(s) IV Continuous <Continuous>      PRN:   acetaminophen   Tablet .. 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 diff - started vancomycin 125mg po q hours   Platelet refractory - platelets dispensed as 1/2 units to be given over 3 hours q 12 hours   FU tacro level from 2/23-never sent. Will send on 2/24    1. Infectious Disease:   acyclovir   Oral Tab/Cap 400 milliGRAM(s) Oral every 8 hours  cefepime   IVPB      cefepime   IVPB 2000 milliGRAM(s) IV Intermittent every 8 hours  clotrimazole Lozenge 1 Lozenge Oral five times a day  fluconAZOLE   Tablet 400 milliGRAM(s) Oral daily  vancomycin    Solution 125 milliGRAM(s) Oral every 6 hours    2. VOD Prophylaxis: Actigall, Glutamine, Heparin (dosed at 100 units / kg / day)     3. GI Prophylaxis:   pantoprazole    Tablet 40 milliGRAM(s) Oral before breakfast    4. Mouthcare - NS / NaHCO3 rinses, Mycelex, Biotene; Skin care     5. GVHD prophylaxis   mycophenolate mofetil 1000 milliGRAM(s) Oral <User Schedule>  tacrolimus 2 milliGRAM(s) Oral two times a day    6. Transfuse & replete electrolytes prn       7. IV hydration, daily weights, strict I&O, prn diuresis     8. PO intake as tolerated, nutrition follow up as needed, MVI, folic acid     9. Antiemetics, anti-diarrhea medications:   metoclopramide Injectable 10 milliGRAM(s) IV Push every 6 hours PRN  ondansetron Injectable 8 milliGRAM(s) IV Push every 8 hours    10. OOB as tolerated, physical therapy consult if needed     11. Monitor coags / fibrinogen 2x week, vitamin K as needed     12. Monitor closely for clinical changes, monitor for fevers     13. Emotional support provided, plan of care discussed and questions addressed     14. Patient education done regarding plan of care, restrictions and discharge planning     15. Continue regular social work input     I have written the above note for Dr. Navarrete who performed service with me in the room.   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    S: Patient seen and examined with HPC Transplant Team:   (+) nausea  diarrhea improved    Denies mouth / tongue / throat pain, dyspnea, nausea, vomiting, abdominal pain    O: Vitals:   Vital Signs Last 24 Hrs  T(C): 36.6 (24 Feb 2019 05:45), Max: 36.8 (23 Feb 2019 14:00)  T(F): 97.9 (24 Feb 2019 05:45), Max: 98.2 (23 Feb 2019 14:00)  HR: 83 (24 Feb 2019 05:45) (81 - 89)  BP: 131/80 (24 Feb 2019 05:45) (109/71 - 155/89)  BP(mean): --  RR: 18 (24 Feb 2019 05:45) (18 - 18)  SpO2: 100% (24 Feb 2019 05:45) (98% - 100%)    Admit weight: 111.4kg   Daily weight 112.6kg    Intake / Output:   02-23 @ 07:01  -  02-24 @ 07:00  --------------------------------------------------------  IN: 2632 mL / OUT: 3300 mL / NET: -668 mL    PE:   Oropharynx: no erythema or ulcers  Oral Mucositis: n/a                                                       stGstrstastdstest:st st1st CVS: S1S2, RRR  Lungs: CTA throughout bilaterally   Abdomen: soft, NT/ND, normoactive BS x 4Q  Extremities: no edema  Gastric Mucositis: n/a                                                 stGstrstastdstest:st st1st Intestinal Mucositis: n/a                                             stGstrstastdstest:st st1st Skin: no rash  TLC: C/D/I  Neuro: A&O x 3   Pain: chronic back pain     LABS:    Blood Cultures:                           8.3    0.1   )-----------( 8        ( 24 Feb 2019 07:45 )             24.0         Mean Cell Volume : 102.0 fl  Mean Cell Hemoglobin : 35.4 pg  Mean Cell Hemoglobin Concentration : 34.6 gm/dL  Auto Neutrophil # : x  Auto Lymphocyte # : x  Auto Monocyte # : x  Auto Eosinophil # : x  Auto Basophil # : x  Auto Neutrophil % : x  Auto Lymphocyte % : x  Auto Monocyte % : x  Auto Eosinophil % : x  Auto Basophil % : x      02-24    141  |  108  |  14  ----------------------------<  87  4.3   |  23  |  0.61    Ca    10.1      24 Feb 2019 07:45  Phos  2.9     02-24  Mg     1.5     02-24    TPro  5.7<L>  /  Alb  3.2<L>  /  TBili  0.2  /  DBili  x   /  AST  14  /  ALT  17  /  AlkPhos  140<H>  02-24      Mg 1.5  Phos 2.9      Uric Acid --        Meds:   Antimicrobials:   acyclovir   Oral Tab/Cap 400 milliGRAM(s) Oral every 8 hours  cefepime   IVPB      cefepime   IVPB 2000 milliGRAM(s) IV Intermittent every 8 hours  clotrimazole Lozenge 1 Lozenge Oral five times a day  fluconAZOLE   Tablet 400 milliGRAM(s) Oral daily  vancomycin    Solution 125 milliGRAM(s) Oral every 6 hours      Heme / Onc:   heparin  Infusion 464 Unit(s)/Hr IV Continuous <Continuous>      GI:  docusate sodium 100 milliGRAM(s) Oral three times a day PRN  pantoprazole    Tablet 40 milliGRAM(s) Oral before breakfast  polyethylene glycol 3350 17 Gram(s) Oral daily  senna 1 Tablet(s) Oral at bedtime PRN  sodium bicarbonate Mouth Rinse 10 milliLiter(s) Swish and Spit five times a day  ursodiol Capsule 300 milliGRAM(s) Oral two times a day with meals      Cardiovascular:   losartan 100 milliGRAM(s) Oral daily      Immunologic:   filgrastim-sndz Injectable 480 MICROGram(s) SubCutaneous daily  immune   globulin 10% (GAMMAGARD) IVPB 20 Gram(s) IV Intermittent <User Schedule>  mycophenolate mofetil 1000 milliGRAM(s) Oral <User Schedule>  tacrolimus 2 milliGRAM(s) Oral every 12 hours      Other medications:   acetaminophen   Tablet .. 650 milliGRAM(s) Oral every 6 hours  acetaminophen   Tablet .. 650 milliGRAM(s) Oral <User Schedule>  benzonatate 100 milliGRAM(s) Oral every 8 hours  Biotene Dry Mouth Oral Rinse 5 milliLiter(s) Swish and Spit five times a day  diphenhydrAMINE   Injectable 25 milliGRAM(s) IV Push every 3 weeks  folic acid 1 milliGRAM(s) Oral daily  levETIRAcetam 500 milliGRAM(s) Oral two times a day  lidocaine/prilocaine Cream 1 Application(s) Topical daily  multivitamin 1 Tablet(s) Oral daily  nystatin Powder 1 Application(s) Topical every 8 hours  ondansetron Injectable 8 milliGRAM(s) IV Push every 8 hours  sodium chloride 0.9%. 1000 milliLiter(s) IV Continuous <Continuous>      PRN:   acetaminophen   Tablet .. 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 diff - started vancomycin 125mg po q hours   Platelet refractory - platelets dispensed as 1/2 units to be given over 3 hours q 12 hours   FU tacro level from 2/23-never sent. Will send on 2/24 1. Infectious Disease:   acyclovir   Oral Tab/Cap 400 milliGRAM(s) Oral every 8 hours  cefepime   IVPB 2000 milliGRAM(s) IV Intermittent every 8 hours  clotrimazole Lozenge 1 Lozenge Oral five times a day  fluconAZOLE   Tablet 400 milliGRAM(s) Oral daily  vancomycin    Solution 125 milliGRAM(s) Oral every 6 hours    2. VOD Prophylaxis: Actigall, Glutamine, Heparin (dosed at 100 units / kg / day)     3. GI Prophylaxis:   pantoprazole    Tablet 40 milliGRAM(s) Oral before breakfast    4. Mouthcare - NS / NaHCO3 rinses, Mycelex, Biotene; Skin care     5. GVHD prophylaxis   mycophenolate mofetil 1000 milliGRAM(s) Oral <User Schedule>  tacrolimus 2 milliGRAM(s) Oral two times a day    6. Transfuse & replete electrolytes prn   thrombocytopenia-replace plt 1/1 unit over 3 hours.     7. IV hydration, daily weights, strict I&O, prn diuresis     8. PO intake as tolerated, nutrition follow up as needed, MVI, folic acid     9. Antiemetics, anti-diarrhea medications:   metoclopramide Injectable 10 milliGRAM(s) IV Push every 6 hours PRN  ondansetron Injectable 8 milliGRAM(s) IV Push every 8 hours    10. OOB as tolerated, physical therapy consult if needed     11. Monitor coags / fibrinogen 2x week, vitamin K as needed     12. Monitor closely for clinical changes, monitor for fevers     13. Emotional support provided, plan of care discussed and questions addressed     14. Patient education done regarding plan of care, restrictions and discharge planning     15. Continue regular social work input     I have written the above note for Dr. Navarrete who performed service with me in the room.   Nataliya Martinez NP

## 2019-02-25 LAB
ALBUMIN SERPL ELPH-MCNC: 3.5 G/DL — SIGNIFICANT CHANGE UP (ref 3.3–5)
ALP SERPL-CCNC: 152 U/L — HIGH (ref 40–120)
ALT FLD-CCNC: 15 U/L — SIGNIFICANT CHANGE UP (ref 10–45)
ANION GAP SERPL CALC-SCNC: 12 MMOL/L — SIGNIFICANT CHANGE UP (ref 5–17)
APTT BLD: 38.9 SEC — HIGH (ref 27.5–36.3)
AST SERPL-CCNC: 15 U/L — SIGNIFICANT CHANGE UP (ref 10–40)
BILIRUB SERPL-MCNC: 0.3 MG/DL — SIGNIFICANT CHANGE UP (ref 0.2–1.2)
BLD GP AB SCN SERPL QL: NEGATIVE — SIGNIFICANT CHANGE UP
BUN SERPL-MCNC: 11 MG/DL — SIGNIFICANT CHANGE UP (ref 7–23)
CALCIUM SERPL-MCNC: 10.1 MG/DL — SIGNIFICANT CHANGE UP (ref 8.4–10.5)
CHLORIDE SERPL-SCNC: 105 MMOL/L — SIGNIFICANT CHANGE UP (ref 96–108)
CO2 SERPL-SCNC: 23 MMOL/L — SIGNIFICANT CHANGE UP (ref 22–31)
CREAT SERPL-MCNC: 0.65 MG/DL — SIGNIFICANT CHANGE UP (ref 0.5–1.3)
FIBRINOGEN PPP-MCNC: 698 MG/DL — HIGH (ref 350–510)
GLUCOSE SERPL-MCNC: 106 MG/DL — HIGH (ref 70–99)
HCT VFR BLD CALC: 28 % — LOW (ref 34.5–45)
HGB BLD-MCNC: 9.1 G/DL — LOW (ref 11.5–15.5)
INR BLD: 0.89 RATIO — SIGNIFICANT CHANGE UP (ref 0.88–1.16)
LDH SERPL L TO P-CCNC: 143 U/L — SIGNIFICANT CHANGE UP (ref 50–242)
MAGNESIUM SERPL-MCNC: 1.7 MG/DL — SIGNIFICANT CHANGE UP (ref 1.6–2.6)
MCHC RBC-ENTMCNC: 32.4 GM/DL — SIGNIFICANT CHANGE UP (ref 32–36)
MCHC RBC-ENTMCNC: 32.8 PG — SIGNIFICANT CHANGE UP (ref 27–34)
MCV RBC AUTO: 101 FL — HIGH (ref 80–100)
PHOSPHATE SERPL-MCNC: 2.3 MG/DL — LOW (ref 2.5–4.5)
PLATELET # BLD AUTO: 8 K/UL — CRITICAL LOW (ref 150–400)
POTASSIUM SERPL-MCNC: 3.8 MMOL/L — SIGNIFICANT CHANGE UP (ref 3.5–5.3)
POTASSIUM SERPL-SCNC: 3.8 MMOL/L — SIGNIFICANT CHANGE UP (ref 3.5–5.3)
PROT SERPL-MCNC: 6.3 G/DL — SIGNIFICANT CHANGE UP (ref 6–8.3)
PROTHROM AB SERPL-ACNC: 10.2 SEC — SIGNIFICANT CHANGE UP (ref 10–12.9)
RBC # BLD: 2.77 M/UL — LOW (ref 3.8–5.2)
RBC # FLD: 12 % — SIGNIFICANT CHANGE UP (ref 10.3–14.5)
RH IG SCN BLD-IMP: POSITIVE — SIGNIFICANT CHANGE UP
SODIUM SERPL-SCNC: 140 MMOL/L — SIGNIFICANT CHANGE UP (ref 135–145)
TACROLIMUS SERPL-MCNC: 10 NG/ML — SIGNIFICANT CHANGE UP
WBC # BLD: 0.1 K/UL — CRITICAL LOW (ref 3.8–10.5)
WBC # FLD AUTO: 0.1 K/UL — CRITICAL LOW (ref 3.8–10.5)

## 2019-02-25 PROCEDURE — 99291 CRITICAL CARE FIRST HOUR: CPT

## 2019-02-25 RX ORDER — ACETAMINOPHEN 500 MG
650 TABLET ORAL EVERY 6 HOURS
Qty: 0 | Refills: 0 | Status: DISCONTINUED | OUTPATIENT
Start: 2019-02-25 | End: 2019-03-04

## 2019-02-25 RX ORDER — POTASSIUM PHOSPHATE, MONOBASIC POTASSIUM PHOSPHATE, DIBASIC 236; 224 MG/ML; MG/ML
15 INJECTION, SOLUTION INTRAVENOUS ONCE
Qty: 0 | Refills: 0 | Status: COMPLETED | OUTPATIENT
Start: 2019-02-25 | End: 2019-02-25

## 2019-02-25 RX ADMIN — Medication 10 MILLILITER(S): at 23:19

## 2019-02-25 RX ADMIN — Medication 1 LOZENGE: at 12:11

## 2019-02-25 RX ADMIN — CEFEPIME 100 MILLIGRAM(S): 1 INJECTION, POWDER, FOR SOLUTION INTRAMUSCULAR; INTRAVENOUS at 13:01

## 2019-02-25 RX ADMIN — Medication 5 MILLILITER(S): at 08:41

## 2019-02-25 RX ADMIN — Medication 25 MILLIGRAM(S): at 20:56

## 2019-02-25 RX ADMIN — Medication 1 LOZENGE: at 15:22

## 2019-02-25 RX ADMIN — NYSTATIN CREAM 1 APPLICATION(S): 100000 CREAM TOPICAL at 05:36

## 2019-02-25 RX ADMIN — HYDROMORPHONE HYDROCHLORIDE 1 MILLIGRAM(S): 2 INJECTION INTRAMUSCULAR; INTRAVENOUS; SUBCUTANEOUS at 19:35

## 2019-02-25 RX ADMIN — Medication 5 MILLILITER(S): at 20:44

## 2019-02-25 RX ADMIN — Medication 480 MICROGRAM(S): at 15:21

## 2019-02-25 RX ADMIN — PANTOPRAZOLE SODIUM 40 MILLIGRAM(S): 20 TABLET, DELAYED RELEASE ORAL at 05:36

## 2019-02-25 RX ADMIN — FLUCONAZOLE 400 MILLIGRAM(S): 150 TABLET ORAL at 12:13

## 2019-02-25 RX ADMIN — HYDROMORPHONE HYDROCHLORIDE 1 MILLIGRAM(S): 2 INJECTION INTRAMUSCULAR; INTRAVENOUS; SUBCUTANEOUS at 19:04

## 2019-02-25 RX ADMIN — HYDROMORPHONE HYDROCHLORIDE 1 MILLIGRAM(S): 2 INJECTION INTRAMUSCULAR; INTRAVENOUS; SUBCUTANEOUS at 06:05

## 2019-02-25 RX ADMIN — Medication 400 MILLIGRAM(S): at 22:08

## 2019-02-25 RX ADMIN — TACROLIMUS 2 MILLIGRAM(S): 5 CAPSULE ORAL at 05:36

## 2019-02-25 RX ADMIN — LEVETIRACETAM 500 MILLIGRAM(S): 250 TABLET, FILM COATED ORAL at 17:20

## 2019-02-25 RX ADMIN — Medication 650 MILLIGRAM(S): at 09:12

## 2019-02-25 RX ADMIN — ONDANSETRON 8 MILLIGRAM(S): 8 TABLET, FILM COATED ORAL at 22:09

## 2019-02-25 RX ADMIN — Medication 125 MILLIGRAM(S): at 23:19

## 2019-02-25 RX ADMIN — Medication 5 MILLILITER(S): at 15:22

## 2019-02-25 RX ADMIN — Medication 1 LOZENGE: at 20:44

## 2019-02-25 RX ADMIN — CEFEPIME 100 MILLIGRAM(S): 1 INJECTION, POWDER, FOR SOLUTION INTRAMUSCULAR; INTRAVENOUS at 22:08

## 2019-02-25 RX ADMIN — HYDROMORPHONE HYDROCHLORIDE 1 MILLIGRAM(S): 2 INJECTION INTRAMUSCULAR; INTRAVENOUS; SUBCUTANEOUS at 02:07

## 2019-02-25 RX ADMIN — NYSTATIN CREAM 1 APPLICATION(S): 100000 CREAM TOPICAL at 22:09

## 2019-02-25 RX ADMIN — Medication 100 MILLIGRAM(S): at 05:36

## 2019-02-25 RX ADMIN — HYDROMORPHONE HYDROCHLORIDE 1 MILLIGRAM(S): 2 INJECTION INTRAMUSCULAR; INTRAVENOUS; SUBCUTANEOUS at 10:15

## 2019-02-25 RX ADMIN — POTASSIUM PHOSPHATE, MONOBASIC POTASSIUM PHOSPHATE, DIBASIC 62.5 MILLIMOLE(S): 236; 224 INJECTION, SOLUTION INTRAVENOUS at 12:10

## 2019-02-25 RX ADMIN — Medication 10 MILLILITER(S): at 20:45

## 2019-02-25 RX ADMIN — Medication 125 MILLIGRAM(S): at 17:20

## 2019-02-25 RX ADMIN — Medication 400 MILLIGRAM(S): at 13:01

## 2019-02-25 RX ADMIN — Medication 1 LOZENGE: at 23:19

## 2019-02-25 RX ADMIN — Medication 5 MILLILITER(S): at 23:19

## 2019-02-25 RX ADMIN — Medication 25 MILLIGRAM(S): at 10:38

## 2019-02-25 RX ADMIN — HYDROMORPHONE HYDROCHLORIDE 1 MILLIGRAM(S): 2 INJECTION INTRAMUSCULAR; INTRAVENOUS; SUBCUTANEOUS at 06:25

## 2019-02-25 RX ADMIN — URSODIOL 300 MILLIGRAM(S): 250 TABLET, FILM COATED ORAL at 17:20

## 2019-02-25 RX ADMIN — NYSTATIN CREAM 1 APPLICATION(S): 100000 CREAM TOPICAL at 13:02

## 2019-02-25 RX ADMIN — Medication 5 MILLILITER(S): at 12:11

## 2019-02-25 RX ADMIN — HYDROMORPHONE HYDROCHLORIDE 1 MILLIGRAM(S): 2 INJECTION INTRAMUSCULAR; INTRAVENOUS; SUBCUTANEOUS at 02:26

## 2019-02-25 RX ADMIN — MYCOPHENOLATE MOFETIL 1000 MILLIGRAM(S): 250 CAPSULE ORAL at 22:09

## 2019-02-25 RX ADMIN — Medication 125 MILLIGRAM(S): at 12:17

## 2019-02-25 RX ADMIN — Medication 125 MILLIGRAM(S): at 05:35

## 2019-02-25 RX ADMIN — MYCOPHENOLATE MOFETIL 1000 MILLIGRAM(S): 250 CAPSULE ORAL at 05:36

## 2019-02-25 RX ADMIN — CEFEPIME 100 MILLIGRAM(S): 1 INJECTION, POWDER, FOR SOLUTION INTRAMUSCULAR; INTRAVENOUS at 05:35

## 2019-02-25 RX ADMIN — ONDANSETRON 8 MILLIGRAM(S): 8 TABLET, FILM COATED ORAL at 05:35

## 2019-02-25 RX ADMIN — Medication 10 MILLILITER(S): at 15:19

## 2019-02-25 RX ADMIN — LEVETIRACETAM 500 MILLIGRAM(S): 250 TABLET, FILM COATED ORAL at 05:36

## 2019-02-25 RX ADMIN — Medication 650 MILLIGRAM(S): at 09:45

## 2019-02-25 RX ADMIN — Medication 1 MILLIGRAM(S): at 12:15

## 2019-02-25 RX ADMIN — MYCOPHENOLATE MOFETIL 1000 MILLIGRAM(S): 250 CAPSULE ORAL at 13:01

## 2019-02-25 RX ADMIN — Medication 1 TABLET(S): at 12:15

## 2019-02-25 RX ADMIN — LOSARTAN POTASSIUM 100 MILLIGRAM(S): 100 TABLET, FILM COATED ORAL at 05:35

## 2019-02-25 RX ADMIN — Medication 10 MILLILITER(S): at 12:21

## 2019-02-25 RX ADMIN — Medication 100 MILLIGRAM(S): at 22:08

## 2019-02-25 RX ADMIN — Medication 100 MILLIGRAM(S): at 13:01

## 2019-02-25 RX ADMIN — HYDROMORPHONE HYDROCHLORIDE 1 MILLIGRAM(S): 2 INJECTION INTRAMUSCULAR; INTRAVENOUS; SUBCUTANEOUS at 10:45

## 2019-02-25 RX ADMIN — TACROLIMUS 2 MILLIGRAM(S): 5 CAPSULE ORAL at 17:20

## 2019-02-25 RX ADMIN — Medication 400 MILLIGRAM(S): at 05:36

## 2019-02-25 RX ADMIN — Medication 1 LOZENGE: at 08:41

## 2019-02-25 RX ADMIN — URSODIOL 300 MILLIGRAM(S): 250 TABLET, FILM COATED ORAL at 08:42

## 2019-02-25 RX ADMIN — Medication 10 MILLILITER(S): at 08:40

## 2019-02-25 RX ADMIN — ONDANSETRON 8 MILLIGRAM(S): 8 TABLET, FILM COATED ORAL at 13:02

## 2019-02-25 NOTE — PROGRESS NOTE ADULT - SUBJECTIVE AND OBJECTIVE BOX
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    S: Patient seen and examined with HPC Transplant Team:     Denies mouth / tongue / throat pain, dyspnea, cough, nausea, vomiting, diarrhea, abdominal pain     O: Vitals:   Vital Signs Last 24 Hrs  T(C): 37.1 (2019 05:40), Max: 37.2 (2019 22:10)  T(F): 98.8 (2019 05:40), Max: 99 (2019 22:10)  HR: 90 (2019 05:40) (84 - 90)  BP: 123/81 (2019 05:40) (123/81 - 151/88)  BP(mean): --  RR: 20 (2019 05:40) (18 - 20)  SpO2: 97% (2019 05:40) (97% - 100%)    Admit weight: 111.4kg   Daily Weight in k.6 (2019 10:02)    Intake / Output:    @ 07:01  -  - @ 07:00  --------------------------------------------------------  IN: 2177 mL / OUT: 2700 mL / NET: -523 mL      PE:   Oropharynx: no erythema or ulcers  Oral Mucositis: n/a                                                       stGstrstastdstest:st st1st CVS: S1S2, RRR  Lungs: CTA throughout bilaterally   Abdomen: soft, NT/ND, normoactive BS x 4Q  Extremities: no edema  Gastric Mucositis: n/a                                                 stGstrstastdstest:st st1st Intestinal Mucositis: n/a                                             stGstrstastdstest:st st1st Skin: no rash  TLC: C/D/I  Neuro: A&O x 3   Pain: chronic back pain     Labs:   CBC Full  -  ( 2019 07:05 )  WBC Count : 0.1 K/uL  Hemoglobin : 9.1 g/dL  Hematocrit : 28.0 %  Platelet Count - Automated : 8 K/uL  Mean Cell Volume : 101.0 fl  Mean Cell Hemoglobin : 32.8 pg  Mean Cell Hemoglobin Concentration : 32.4 gm/dL  Auto Neutrophil # : x  Auto Lymphocyte # : x  Auto Monocyte # : x  Auto Eosinophil # : x  Auto Basophil # : x  Auto Neutrophil % : x  Auto Lymphocyte % : x  Auto Monocyte % : x  Auto Eosinophil % : x  Auto Basophil % : x                          9.1    0.1   )-----------( 8        ( 2019 07:05 )             28.0         140  |  105  |  11  ----------------------------<  106<H>  3.8   |  23  |  0.65    Ca    10.1      2019 07:03  Phos  2.3       Mg     1.7         TPro  6.3  /  Alb  3.5  /  TBili  0.3  /  DBili  x   /  AST  15  /  ALT  15  /  AlkPhos  152<H>      PT/INR - ( 2019 07:05 )   PT: 10.2 sec;   INR: 0.89 ratio         PTT - ( 2019 07:05 )  PTT:38.9 sec  LIVER FUNCTIONS - ( 2019 07:03 )  Alb: 3.5 g/dL / Pro: 6.3 g/dL / ALK PHOS: 152 U/L / ALT: 15 U/L / AST: 15 U/L / GGT: x           Lactate Dehydrogenase, Serum: 143 U/L ( @ 07:03)        @ 09:16  Tacrolimus 8.5                Cyclosporine --    Cultures:   Culture Results: urine  <10,000 CFU/ml Normal Urogenital everton present (19 @ 18:41)    Culture Results:  blood   No growth at 5 days. (19 @ 18:30)    Culture Results: blood   No growth at 5 days. (19 @ 18:30)    Radiology:   EXAM:  XR CHEST PORTABLE URGENT 1V                        PROCEDURE DATE:  2019    IMPRESSION:  1.  The lungs are clear.    EXAM:  CT BRAIN                        PROCEDURE DATE:  2019    IMPRESSION:  No acute intracranial abnormality is noted. No subdural collections are   visualized. If the patient has new and persistent symptoms, consider   follow-up brain MRI with and without contrast, if there are no MRI or   contrast contraindications.  New moderate mucosal thickening involves the paranasal sinuses. Correlate   for possible sinusitis      Meds:   Antimicrobials:   acyclovir   Oral Tab/Cap 400 milliGRAM(s) Oral every 8 hours  cefepime   IVPB      cefepime   IVPB 2000 milliGRAM(s) IV Intermittent every 8 hours  clotrimazole Lozenge 1 Lozenge Oral five times a day  fluconAZOLE   Tablet 400 milliGRAM(s) Oral daily  vancomycin    Solution 125 milliGRAM(s) Oral every 6 hours      Heme / Onc:   heparin  Infusion 464 Unit(s)/Hr IV Continuous <Continuous>      GI:  docusate sodium 100 milliGRAM(s) Oral three times a day PRN  pantoprazole    Tablet 40 milliGRAM(s) Oral before breakfast  polyethylene glycol 3350 17 Gram(s) Oral daily  senna 1 Tablet(s) Oral at bedtime PRN  sodium bicarbonate Mouth Rinse 10 milliLiter(s) Swish and Spit five times a day  ursodiol Capsule 300 milliGRAM(s) Oral two times a day with meals      Cardiovascular:   losartan 100 milliGRAM(s) Oral daily      Immunologic:   filgrastim-sndz Injectable 480 MICROGram(s) SubCutaneous daily  immune   globulin 10% (GAMMAGARD) IVPB 20 Gram(s) IV Intermittent <User Schedule>  mycophenolate mofetil 1000 milliGRAM(s) Oral <User Schedule>  tacrolimus 2 milliGRAM(s) Oral every 12 hours      Other medications:   acetaminophen   Tablet .. 650 milliGRAM(s) Oral every 6 hours  acetaminophen   Tablet .. 650 milliGRAM(s) Oral <User Schedule>  benzonatate 100 milliGRAM(s) Oral every 8 hours  Biotene Dry Mouth Oral Rinse 5 milliLiter(s) Swish and Spit five times a day  diphenhydrAMINE   Injectable 25 milliGRAM(s) IV Push every 3 weeks  folic acid 1 milliGRAM(s) Oral daily  levETIRAcetam 500 milliGRAM(s) Oral two times a day  lidocaine/prilocaine Cream 1 Application(s) Topical daily  multivitamin 1 Tablet(s) Oral daily  nystatin Powder 1 Application(s) Topical every 8 hours  ondansetron Injectable 8 milliGRAM(s) IV Push every 8 hours  potassium phosphate IVPB 15 milliMole(s) IV Intermittent once  sodium chloride 0.9%. 1000 milliLiter(s) IV Continuous <Continuous>      PRN:   acetaminophen   Tablet .. 650 milliGRAM(s) Oral every 6 hours PRN  acetaminophen   Tablet .. 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 diff - started vancomycin 125mg po q hours   Platelet refractory - platelets dispensed as 1/2 units to be given over 3 hours q 12 hours     1. Infectious Disease:   acyclovir   Oral Tab/Cap 400 milliGRAM(s) Oral every 8 hours  cefepime   IVPB      cefepime   IVPB 2000 milliGRAM(s) IV Intermittent every 8 hours  clotrimazole Lozenge 1 Lozenge Oral five times a day  fluconAZOLE   Tablet 400 milliGRAM(s) Oral daily  vancomycin    Solution 125 milliGRAM(s) Oral every 6 hours    2. VOD Prophylaxis: Actigall, Glutamine, Heparin (dosed at 100 units / kg / day)     3. GI Prophylaxis:    pantoprazole    Tablet 40 milliGRAM(s) Oral before breakfast    4. Mouthcare - NS / NaHCO3 rinses, Mycelex, Biotene; Skin care     5. GVHD prophylaxis   mycophenolate mofetil 1000 milliGRAM(s) Oral <User Schedule>  tacrolimus 2 milliGRAM(s) Oral every 12 hours    6. Transfuse & replete electrolytes prn   potassium phosphate IVPB 15 milliMole(s) IV Intermittent once  1/2 unit platelets over 3 hours     7. IV hydration, daily weights, strict I&O, prn diuresis     8. PO intake as tolerated, nutrition follow up as needed, MVI, folic acid     9. Antiemetics, anti-diarrhea medications:   metoclopramide Injectable 10 milliGRAM(s) IV Push every 6 hours PRN  ondansetron Injectable 8 milliGRAM(s) IV Push every 8 hours    10. OOB as tolerated, physical therapy consult if needed     11. Monitor coags / fibrinogen 2x week, vitamin K as needed     12. Monitor closely for clinical changes, monitor for fevers     13. Emotional support provided, plan of care discussed and questions addressed     14. Patient education done regarding plan of care, restrictions and discharge planning     15. Continue regular social work input     I have written the above note for Dr. Logan who performed service with me in the room.   Janett Barrow NP-C ([phone])    I have seen and examined patient with NP, I agree with above note as scribed. 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    S: Patient seen and examined with HPC Transplant Team:   + intermittent mild headache   + blood blister on lip - stable   + intermittent nausea   + cough - stable, slightly improved  + fatigue   + chronic back pain; stable   Denies mouth / tongue / throat pain, dyspnea, vomiting, diarrhea, abdominal pain     O: Vitals:   Vital Signs Last 24 Hrs  T(C): 37.1 (2019 05:40), Max: 37.2 (2019 22:10)  T(F): 98.8 (2019 05:40), Max: 99 (2019 22:10)  HR: 90 (2019 05:40) (84 - 90)  BP: 123/81 (2019 05:40) (123/81 - 151/88)  BP(mean): --  RR: 20 (2019 05:40) (18 - 20)  SpO2: 97% (2019 05:40) (97% - 100%)    Admit weight: 111.4kg   Daily Weight in k.6 (2019 10:02)  Today's weight: 111.4kg     Intake / Output:    @ 07:01  -  - @ 07:00  --------------------------------------------------------  IN: 2177 mL / OUT: 2700 mL / NET: -523 mL      PE:   Oropharynx: no erythema or ulcers  Oral Mucositis: n/a                                                       stGstrstastdstest:st st1st CVS: S1S2, RRR  Lungs: CTA throughout bilaterally   Abdomen: soft, NT/ND, normoactive BS x 4Q  Extremities: no edema  Gastric Mucositis: n/a                                                 stGstrstastdstest:st st1st Intestinal Mucositis: n/a                                             stGstrstastdstest:st st1st Skin: no rash  TLC: C/D/I  Neuro: A&O x 3   Pain: chronic back pain     Labs:   CBC Full  -  ( 2019 07:05 )  WBC Count : 0.1 K/uL  Hemoglobin : 9.1 g/dL  Hematocrit : 28.0 %  Platelet Count - Automated : 8 K/uL  Mean Cell Volume : 101.0 fl  Mean Cell Hemoglobin : 32.8 pg  Mean Cell Hemoglobin Concentration : 32.4 gm/dL  Auto Neutrophil # : x  Auto Lymphocyte # : x  Auto Monocyte # : x  Auto Eosinophil # : x  Auto Basophil # : x  Auto Neutrophil % : x  Auto Lymphocyte % : x  Auto Monocyte % : x  Auto Eosinophil % : x  Auto Basophil % : x                          9.1    0.1   )-----------( 8        ( 2019 07:05 )             28.0         140  |  105  |  11  ----------------------------<  106<H>  3.8   |  23  |  0.65    Ca    10.1      2019 07:03  Phos  2.3       Mg     1.7         TPro  6.3  /  Alb  3.5  /  TBili  0.3  /  DBili  x   /  AST  15  /  ALT  15  /  AlkPhos  152<H>      PT/INR - ( 2019 07:05 )   PT: 10.2 sec;   INR: 0.89 ratio         PTT - ( 2019 07:05 )  PTT:38.9 sec  LIVER FUNCTIONS - ( 2019 07:03 )  Alb: 3.5 g/dL / Pro: 6.3 g/dL / ALK PHOS: 152 U/L / ALT: 15 U/L / AST: 15 U/L / GGT: x           Lactate Dehydrogenase, Serum: 143 U/L ( @ 07:03)        @ 09:16  Tacrolimus 8.5                Cyclosporine --    Cultures:   Culture Results: urine  <10,000 CFU/ml Normal Urogenital everton present (19 @ 18:41)    Culture Results:  blood   No growth at 5 days. (19 @ 18:30)    Culture Results: blood   No growth at 5 days. (19 @ 18:30)    Radiology:   EXAM:  XR CHEST PORTABLE URGENT 1V                        PROCEDURE DATE:  2019    IMPRESSION:  1.  The lungs are clear.    EXAM:  CT BRAIN                        PROCEDURE DATE:  2019    IMPRESSION:  No acute intracranial abnormality is noted. No subdural collections are   visualized. If the patient has new and persistent symptoms, consider   follow-up brain MRI with and without contrast, if there are no MRI or   contrast contraindications.  New moderate mucosal thickening involves the paranasal sinuses. Correlate   for possible sinusitis      Meds:   Antimicrobials:   acyclovir   Oral Tab/Cap 400 milliGRAM(s) Oral every 8 hours  cefepime   IVPB      cefepime   IVPB 2000 milliGRAM(s) IV Intermittent every 8 hours  clotrimazole Lozenge 1 Lozenge Oral five times a day  fluconAZOLE   Tablet 400 milliGRAM(s) Oral daily  vancomycin    Solution 125 milliGRAM(s) Oral every 6 hours      Heme / Onc:   heparin  Infusion 464 Unit(s)/Hr IV Continuous <Continuous>      GI:  docusate sodium 100 milliGRAM(s) Oral three times a day PRN  pantoprazole    Tablet 40 milliGRAM(s) Oral before breakfast  polyethylene glycol 3350 17 Gram(s) Oral daily  senna 1 Tablet(s) Oral at bedtime PRN  sodium bicarbonate Mouth Rinse 10 milliLiter(s) Swish and Spit five times a day  ursodiol Capsule 300 milliGRAM(s) Oral two times a day with meals      Cardiovascular:   losartan 100 milliGRAM(s) Oral daily      Immunologic:   filgrastim-sndz Injectable 480 MICROGram(s) SubCutaneous daily  immune   globulin 10% (GAMMAGARD) IVPB 20 Gram(s) IV Intermittent <User Schedule>  mycophenolate mofetil 1000 milliGRAM(s) Oral <User Schedule>  tacrolimus 2 milliGRAM(s) Oral every 12 hours      Other medications:   acetaminophen   Tablet .. 650 milliGRAM(s) Oral every 6 hours  acetaminophen   Tablet .. 650 milliGRAM(s) Oral <User Schedule>  benzonatate 100 milliGRAM(s) Oral every 8 hours  Biotene Dry Mouth Oral Rinse 5 milliLiter(s) Swish and Spit five times a day  diphenhydrAMINE   Injectable 25 milliGRAM(s) IV Push every 3 weeks  folic acid 1 milliGRAM(s) Oral daily  levETIRAcetam 500 milliGRAM(s) Oral two times a day  lidocaine/prilocaine Cream 1 Application(s) Topical daily  multivitamin 1 Tablet(s) Oral daily  nystatin Powder 1 Application(s) Topical every 8 hours  ondansetron Injectable 8 milliGRAM(s) IV Push every 8 hours  potassium phosphate IVPB 15 milliMole(s) IV Intermittent once  sodium chloride 0.9%. 1000 milliLiter(s) IV Continuous <Continuous>      PRN:   acetaminophen   Tablet .. 650 milliGRAM(s) Oral every 6 hours PRN  acetaminophen   Tablet .. 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 diff - started vancomycin 125mg po q hours   Platelet refractory - platelets dispensed as 1/2 units to be given over 3 hours q 12 hours     1. Infectious Disease:   acyclovir   Oral Tab/Cap 400 milliGRAM(s) Oral every 8 hours  cefepime   IVPB      cefepime   IVPB 2000 milliGRAM(s) IV Intermittent every 8 hours  clotrimazole Lozenge 1 Lozenge Oral five times a day  fluconAZOLE   Tablet 400 milliGRAM(s) Oral daily  vancomycin    Solution 125 milliGRAM(s) Oral every 6 hours    2. VOD Prophylaxis: Actigall, Glutamine, Heparin (dosed at 100 units / kg / day)     3. GI Prophylaxis:    pantoprazole    Tablet 40 milliGRAM(s) Oral before breakfast    4. Mouthcare - NS / NaHCO3 rinses, Mycelex, Biotene; Skin care     5. GVHD prophylaxis   mycophenolate mofetil 1000 milliGRAM(s) Oral <User Schedule>  tacrolimus 2 milliGRAM(s) Oral every 12 hours    6. Transfuse & replete electrolytes prn   potassium phosphate IVPB 15 milliMole(s) IV Intermittent once  1/2 unit platelets over 3 hours     7. IV hydration, daily weights, strict I&O, prn diuresis     8. PO intake as tolerated, nutrition follow up as needed, MVI, folic acid     9. Antiemetics, anti-diarrhea medications:   metoclopramide Injectable 10 milliGRAM(s) IV Push every 6 hours PRN  ondansetron Injectable 8 milliGRAM(s) IV Push every 8 hours    10. OOB as tolerated, physical therapy consult if needed     11. Monitor coags / fibrinogen 2x week, vitamin K as needed     12. Monitor closely for clinical changes, monitor for fevers     13. Emotional support provided, plan of care discussed and questions addressed     14. Patient education done regarding plan of care, restrictions and discharge planning     15. Continue regular social work input     I have written the above note for Dr. Logan who performed service with me in the room.   Janett Barrow NP-C ([phone])    I have seen and examined patient with NP, I agree with above note as scribed.

## 2019-02-25 NOTE — PROGRESS NOTE ADULT - ATTENDING COMMENTS
58 year old female with PMH [place] chromosome positive ALL diagnosed in 2016 s/p R HyperCVAD X 4 cycles, IT MTX X 2, s/p autologous stem cell transplantation (12/16/16),  who presented in October 2018 with subdural hemorrhage and relapsed disease confirmed by peripheral blood flow cytometry treated with Inotuzumab X 2 cycles.  Bone marrow biopsy on 1/23/19 showed remission with FISH and PCR for BCR-ABL translocation negative on the BM specimen but peripheral blood testing on 1/31 showed .004% BCR-ABL transcript with negative FISH suggesting MRD positivity.  Patient was on Ponatinib, DC 1/31/19. LFT's improved off drug.  Now in CR2    Patient was admitted for haploidentical stem cell transplantation with fludarabine/cytoxan/TBI conditioning. She is s/p HPC transplant, day + 12  Completed high-dose Cytoxan on days +3, +4(GVHD prophylaxis); then begin Tacrolimis, Cellcept on day +5.  Begin Zarxio on day +5    Neutropenic fevers resolved, s/p haplo storm - on Cefepime, culture from 2/14, 2/16 negative.  CXR negative.  On Acyclovir, Fluconazole prophylaxis.  C. Diff diarrhea- continue oral Vancomycin, diarrhea improving  Monitor I/O's/weights- lasix as needed  VOD prophylaxis as per protocol.  Encourage nutrition, mouth care, ambulation. mild nausea today, receiving antiemetics    History of SDH, continue Keppra. CT scan baseline 2/7/19 negative for SDH.   History of HTN continue Losartan.  OOB/ambulate. 58 year old female with PMH [place] chromosome positive ALL diagnosed in 2016 s/p R HyperCVAD X 4 cycles, IT MTX X 2, s/p autologous stem cell transplantation (12/16/16),  who presented in October 2018 with subdural hemorrhage and relapsed disease confirmed by peripheral blood flow cytometry treated with Inotuzumab X 2 cycles.  Bone marrow biopsy on 1/23/19 showed remission with FISH and PCR for BCR-ABL translocation negative on the BM specimen but peripheral blood testing on 1/31 showed .004% BCR-ABL transcript with negative FISH suggesting MRD positivity.  Patient was on Ponatinib, DC 1/31/19. LFT's improved off drug.  Now in CR2    Patient was admitted for haploidentical stem cell transplantation with fludarabine/cytoxan/TBI conditioning. She is s/p HPC transplant, day + 12  Completed high-dose Cytoxan on days +3, +4(GVHD prophylaxis); then begin Tacrolimis, Cellcept on day +5.  Begin Zarxio on day +5    Neutropenic fevers resolved, s/p haplo storm - on Cefepime, culture from 2/14, 2/16 negative.  CXR negative.  On Acyclovir, Fluconazole prophylaxis.  C. Diff diarrhea- continue oral Vancomycin, diarrhea improving  Monitor I/O's/weights- lasix as needed  VOD prophylaxis as per protocol.  Encourage nutrition, mouth care, ambulation. mild nausea today, receiving antiemetics    History of SDH, continue Keppra. CT scan baseline 2/7/19 negative for SDH.   History of HTN continue Losartan.  OOB/ambulate 58 year old female with PMH [place] chromosome positive ALL diagnosed in 2016 s/p R HyperCVAD X 4 cycles, IT MTX X 2, s/p autologous stem cell transplantation (12/16/16),  who presented in October 2018 with subdural hemorrhage and relapsed disease confirmed by peripheral blood flow cytometry treated with Inotuzumab X 2 cycles.  Bone marrow biopsy on 1/23/19 showed remission with FISH and PCR for BCR-ABL translocation negative on the BM specimen but peripheral blood testing on 1/31 showed .004% BCR-ABL transcript with negative FISH suggesting MRD positivity.  Patient was on Ponatinib, DC 1/31/19. LFT's improved off drug.  Now in CR2    Patient was admitted for haploidentical stem cell transplantation with fludarabine/cytoxan/TBI conditioning. She is s/p HPC transplant, day + 12  Completed high-dose Cytoxan on days +3, +4(GVHD prophylaxis); then begin Tacrolimus, Cellcept on day +5.  Begin Zarxio on day +5    Neutropenic fevers resolved, s/p haplo storm - on Cefepime, culture from 2/14, 2/16 negative.  CXR negative.  On Acyclovir, Fluconazole prophylaxis.  C. Diff diarrhea- continue oral Vancomycin, diarrhea improving  Monitor I/O's/weights- lasix as needed  VOD prophylaxis as per protocol.  Encourage nutrition, mouth care, ambulation. mild nausea today, receiving antiemetics    History of SDH, continue Keppra. CT scan baseline 2/7/19 negative for SDH.   History of HTN continue Losartan.  OOB/ambulate

## 2019-02-26 LAB
ALBUMIN SERPL ELPH-MCNC: 3.4 G/DL — SIGNIFICANT CHANGE UP (ref 3.3–5)
ALP SERPL-CCNC: 147 U/L — HIGH (ref 40–120)
ALT FLD-CCNC: 18 U/L — SIGNIFICANT CHANGE UP (ref 10–45)
ANION GAP SERPL CALC-SCNC: 12 MMOL/L — SIGNIFICANT CHANGE UP (ref 5–17)
AST SERPL-CCNC: 14 U/L — SIGNIFICANT CHANGE UP (ref 10–40)
BILIRUB SERPL-MCNC: 0.2 MG/DL — SIGNIFICANT CHANGE UP (ref 0.2–1.2)
BUN SERPL-MCNC: 13 MG/DL — SIGNIFICANT CHANGE UP (ref 7–23)
CALCIUM SERPL-MCNC: 10.2 MG/DL — SIGNIFICANT CHANGE UP (ref 8.4–10.5)
CHLORIDE SERPL-SCNC: 105 MMOL/L — SIGNIFICANT CHANGE UP (ref 96–108)
CO2 SERPL-SCNC: 23 MMOL/L — SIGNIFICANT CHANGE UP (ref 22–31)
CREAT SERPL-MCNC: 0.8 MG/DL — SIGNIFICANT CHANGE UP (ref 0.5–1.3)
GLUCOSE SERPL-MCNC: 94 MG/DL — SIGNIFICANT CHANGE UP (ref 70–99)
HCT VFR BLD CALC: 24.6 % — LOW (ref 34.5–45)
HGB BLD-MCNC: 8.6 G/DL — LOW (ref 11.5–15.5)
LDH SERPL L TO P-CCNC: 152 U/L — SIGNIFICANT CHANGE UP (ref 50–242)
MAGNESIUM SERPL-MCNC: 1.5 MG/DL — LOW (ref 1.6–2.6)
MCHC RBC-ENTMCNC: 34.8 GM/DL — SIGNIFICANT CHANGE UP (ref 32–36)
MCHC RBC-ENTMCNC: 35.1 PG — HIGH (ref 27–34)
MCV RBC AUTO: 101 FL — HIGH (ref 80–100)
PHOSPHATE SERPL-MCNC: 2.7 MG/DL — SIGNIFICANT CHANGE UP (ref 2.5–4.5)
PLATELET # BLD AUTO: 13 K/UL — CRITICAL LOW (ref 150–400)
POTASSIUM SERPL-MCNC: 3.7 MMOL/L — SIGNIFICANT CHANGE UP (ref 3.5–5.3)
POTASSIUM SERPL-SCNC: 3.7 MMOL/L — SIGNIFICANT CHANGE UP (ref 3.5–5.3)
PROT SERPL-MCNC: 6 G/DL — SIGNIFICANT CHANGE UP (ref 6–8.3)
RBC # BLD: 2.44 M/UL — LOW (ref 3.8–5.2)
RBC # FLD: 12.2 % — SIGNIFICANT CHANGE UP (ref 10.3–14.5)
SODIUM SERPL-SCNC: 140 MMOL/L — SIGNIFICANT CHANGE UP (ref 135–145)
TACROLIMUS SERPL-MCNC: 8.6 NG/ML — SIGNIFICANT CHANGE UP
WBC # BLD: 0.2 K/UL — CRITICAL LOW (ref 3.8–10.5)
WBC # FLD AUTO: 0.2 K/UL — CRITICAL LOW (ref 3.8–10.5)

## 2019-02-26 PROCEDURE — 99291 CRITICAL CARE FIRST HOUR: CPT

## 2019-02-26 RX ORDER — MAGNESIUM OXIDE 400 MG ORAL TABLET 241.3 MG
400 TABLET ORAL
Qty: 0 | Refills: 0 | Status: DISCONTINUED | OUTPATIENT
Start: 2019-02-26 | End: 2019-03-04

## 2019-02-26 RX ORDER — AMLODIPINE BESYLATE 2.5 MG/1
1 TABLET ORAL
Qty: 30 | Refills: 3
Start: 2019-02-26 | End: 2019-06-25

## 2019-02-26 RX ORDER — MAGNESIUM SULFATE 500 MG/ML
2 VIAL (ML) INJECTION ONCE
Qty: 0 | Refills: 0 | Status: COMPLETED | OUTPATIENT
Start: 2019-02-26 | End: 2019-02-26

## 2019-02-26 RX ORDER — AMLODIPINE BESYLATE 2.5 MG/1
5 TABLET ORAL DAILY
Qty: 0 | Refills: 0 | Status: DISCONTINUED | OUTPATIENT
Start: 2019-02-26 | End: 2019-03-04

## 2019-02-26 RX ORDER — HYDROMORPHONE HYDROCHLORIDE 2 MG/ML
1 INJECTION INTRAMUSCULAR; INTRAVENOUS; SUBCUTANEOUS ONCE
Qty: 0 | Refills: 0 | Status: DISCONTINUED | OUTPATIENT
Start: 2019-02-26 | End: 2019-02-26

## 2019-02-26 RX ORDER — HYDROMORPHONE HYDROCHLORIDE 2 MG/ML
1 INJECTION INTRAMUSCULAR; INTRAVENOUS; SUBCUTANEOUS
Qty: 0 | Refills: 0 | Status: DISCONTINUED | OUTPATIENT
Start: 2019-02-26 | End: 2019-02-28

## 2019-02-26 RX ORDER — MAGNESIUM OXIDE 400 MG ORAL TABLET 241.3 MG
1 TABLET ORAL
Qty: 90 | Refills: 3 | OUTPATIENT
Start: 2019-02-26 | End: 2019-06-25

## 2019-02-26 RX ADMIN — MAGNESIUM OXIDE 400 MG ORAL TABLET 400 MILLIGRAM(S): 241.3 TABLET ORAL at 10:08

## 2019-02-26 RX ADMIN — Medication 50 GRAM(S): at 10:08

## 2019-02-26 RX ADMIN — HYDROMORPHONE HYDROCHLORIDE 1 MILLIGRAM(S): 2 INJECTION INTRAMUSCULAR; INTRAVENOUS; SUBCUTANEOUS at 22:15

## 2019-02-26 RX ADMIN — NYSTATIN CREAM 1 APPLICATION(S): 100000 CREAM TOPICAL at 13:25

## 2019-02-26 RX ADMIN — Medication 400 MILLIGRAM(S): at 21:01

## 2019-02-26 RX ADMIN — MYCOPHENOLATE MOFETIL 1000 MILLIGRAM(S): 250 CAPSULE ORAL at 21:02

## 2019-02-26 RX ADMIN — HYDROMORPHONE HYDROCHLORIDE 1 MILLIGRAM(S): 2 INJECTION INTRAMUSCULAR; INTRAVENOUS; SUBCUTANEOUS at 16:19

## 2019-02-26 RX ADMIN — HYDROMORPHONE HYDROCHLORIDE 1 MILLIGRAM(S): 2 INJECTION INTRAMUSCULAR; INTRAVENOUS; SUBCUTANEOUS at 22:00

## 2019-02-26 RX ADMIN — Medication 5 MILLILITER(S): at 19:39

## 2019-02-26 RX ADMIN — LEVETIRACETAM 500 MILLIGRAM(S): 250 TABLET, FILM COATED ORAL at 17:47

## 2019-02-26 RX ADMIN — Medication 100 MILLIGRAM(S): at 13:24

## 2019-02-26 RX ADMIN — Medication 10 MILLILITER(S): at 11:34

## 2019-02-26 RX ADMIN — PANTOPRAZOLE SODIUM 40 MILLIGRAM(S): 20 TABLET, DELAYED RELEASE ORAL at 05:06

## 2019-02-26 RX ADMIN — Medication 125 MILLIGRAM(S): at 17:47

## 2019-02-26 RX ADMIN — CEFEPIME 100 MILLIGRAM(S): 1 INJECTION, POWDER, FOR SOLUTION INTRAMUSCULAR; INTRAVENOUS at 21:02

## 2019-02-26 RX ADMIN — TACROLIMUS 2 MILLIGRAM(S): 5 CAPSULE ORAL at 17:47

## 2019-02-26 RX ADMIN — MAGNESIUM OXIDE 400 MG ORAL TABLET 400 MILLIGRAM(S): 241.3 TABLET ORAL at 11:52

## 2019-02-26 RX ADMIN — ONDANSETRON 8 MILLIGRAM(S): 8 TABLET, FILM COATED ORAL at 21:02

## 2019-02-26 RX ADMIN — Medication 10 MILLILITER(S): at 08:08

## 2019-02-26 RX ADMIN — Medication 100 MILLIGRAM(S): at 21:01

## 2019-02-26 RX ADMIN — HYDROMORPHONE HYDROCHLORIDE 1 MILLIGRAM(S): 2 INJECTION INTRAMUSCULAR; INTRAVENOUS; SUBCUTANEOUS at 12:30

## 2019-02-26 RX ADMIN — NYSTATIN CREAM 1 APPLICATION(S): 100000 CREAM TOPICAL at 21:02

## 2019-02-26 RX ADMIN — Medication 5 MILLILITER(S): at 15:22

## 2019-02-26 RX ADMIN — Medication 125 MILLIGRAM(S): at 11:38

## 2019-02-26 RX ADMIN — Medication 10 MILLILITER(S): at 19:40

## 2019-02-26 RX ADMIN — Medication 1 LOZENGE: at 19:39

## 2019-02-26 RX ADMIN — Medication 480 MICROGRAM(S): at 13:26

## 2019-02-26 RX ADMIN — Medication 1 TABLET(S): at 11:52

## 2019-02-26 RX ADMIN — Medication 400 MILLIGRAM(S): at 05:06

## 2019-02-26 RX ADMIN — HYDROMORPHONE HYDROCHLORIDE 1 MILLIGRAM(S): 2 INJECTION INTRAMUSCULAR; INTRAVENOUS; SUBCUTANEOUS at 11:51

## 2019-02-26 RX ADMIN — Medication 125 MILLIGRAM(S): at 05:06

## 2019-02-26 RX ADMIN — NYSTATIN CREAM 1 APPLICATION(S): 100000 CREAM TOPICAL at 05:08

## 2019-02-26 RX ADMIN — MAGNESIUM OXIDE 400 MG ORAL TABLET 400 MILLIGRAM(S): 241.3 TABLET ORAL at 17:47

## 2019-02-26 RX ADMIN — Medication 1 LOZENGE: at 11:53

## 2019-02-26 RX ADMIN — Medication 1 MILLIGRAM(S): at 11:52

## 2019-02-26 RX ADMIN — MYCOPHENOLATE MOFETIL 1000 MILLIGRAM(S): 250 CAPSULE ORAL at 05:07

## 2019-02-26 RX ADMIN — ONDANSETRON 8 MILLIGRAM(S): 8 TABLET, FILM COATED ORAL at 13:25

## 2019-02-26 RX ADMIN — Medication 5 MILLILITER(S): at 11:53

## 2019-02-26 RX ADMIN — HYDROMORPHONE HYDROCHLORIDE 1 MILLIGRAM(S): 2 INJECTION INTRAMUSCULAR; INTRAVENOUS; SUBCUTANEOUS at 19:45

## 2019-02-26 RX ADMIN — TACROLIMUS 2 MILLIGRAM(S): 5 CAPSULE ORAL at 05:07

## 2019-02-26 RX ADMIN — AMLODIPINE BESYLATE 5 MILLIGRAM(S): 2.5 TABLET ORAL at 13:24

## 2019-02-26 RX ADMIN — CEFEPIME 100 MILLIGRAM(S): 1 INJECTION, POWDER, FOR SOLUTION INTRAMUSCULAR; INTRAVENOUS at 05:06

## 2019-02-26 RX ADMIN — LOSARTAN POTASSIUM 100 MILLIGRAM(S): 100 TABLET, FILM COATED ORAL at 05:06

## 2019-02-26 RX ADMIN — Medication 5 MILLILITER(S): at 08:09

## 2019-02-26 RX ADMIN — HYDROMORPHONE HYDROCHLORIDE 1 MILLIGRAM(S): 2 INJECTION INTRAMUSCULAR; INTRAVENOUS; SUBCUTANEOUS at 19:56

## 2019-02-26 RX ADMIN — URSODIOL 300 MILLIGRAM(S): 250 TABLET, FILM COATED ORAL at 08:09

## 2019-02-26 RX ADMIN — MYCOPHENOLATE MOFETIL 1000 MILLIGRAM(S): 250 CAPSULE ORAL at 13:24

## 2019-02-26 RX ADMIN — Medication 400 MILLIGRAM(S): at 13:24

## 2019-02-26 RX ADMIN — HYDROMORPHONE HYDROCHLORIDE 1 MILLIGRAM(S): 2 INJECTION INTRAMUSCULAR; INTRAVENOUS; SUBCUTANEOUS at 03:11

## 2019-02-26 RX ADMIN — Medication 100 MILLIGRAM(S): at 05:06

## 2019-02-26 RX ADMIN — Medication 1 LOZENGE: at 08:08

## 2019-02-26 RX ADMIN — Medication 1 LOZENGE: at 15:22

## 2019-02-26 RX ADMIN — HYDROMORPHONE HYDROCHLORIDE 1 MILLIGRAM(S): 2 INJECTION INTRAMUSCULAR; INTRAVENOUS; SUBCUTANEOUS at 03:00

## 2019-02-26 RX ADMIN — FLUCONAZOLE 400 MILLIGRAM(S): 150 TABLET ORAL at 11:52

## 2019-02-26 RX ADMIN — CEFEPIME 100 MILLIGRAM(S): 1 INJECTION, POWDER, FOR SOLUTION INTRAMUSCULAR; INTRAVENOUS at 13:24

## 2019-02-26 RX ADMIN — ONDANSETRON 8 MILLIGRAM(S): 8 TABLET, FILM COATED ORAL at 05:07

## 2019-02-26 RX ADMIN — Medication 10 MILLILITER(S): at 15:22

## 2019-02-26 RX ADMIN — URSODIOL 300 MILLIGRAM(S): 250 TABLET, FILM COATED ORAL at 17:47

## 2019-02-26 RX ADMIN — HYDROMORPHONE HYDROCHLORIDE 1 MILLIGRAM(S): 2 INJECTION INTRAMUSCULAR; INTRAVENOUS; SUBCUTANEOUS at 16:45

## 2019-02-26 RX ADMIN — LEVETIRACETAM 500 MILLIGRAM(S): 250 TABLET, FILM COATED ORAL at 05:06

## 2019-02-26 NOTE — CHART NOTE - NSCHARTNOTEFT_GEN_A_CORE
Pt seen for nutrition follow up. 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     Source: Patient [x ]    Family [ ]     other [ ]    Diet : Regular diet with glutasolve 1 packet daily       Patient reports intermittent nausea with episodes of vomiting, last vomited yesterday, pt reports multiple episodes of diarrhea starting last week, noted pt is C.diff (+), diarrhea has been improving, pt with 2 episodes yesterday, 2 episodes today      PO intake:  Pt reports appetite/intake has declined due to nausea and diarrhea, Pt now consuming <50% of meals, pt did not eat lunch yesterday, pt had cream of wheat and phan for breakfast today. Pt enjoys milkshakes though [employer] program however due to diarrhea has not been able to take this, pt receptive to carnation instant breakfast, declined ensure supplements.       Current Weight: 245.5 lbs (2/7), 244.9 lbs (2/20), 246.6 lbs (2/26), weight remains stable   % Weight Change    Pertinent Medications: MEDICATIONS  (STANDING):  acetaminophen   Tablet .. 650 milliGRAM(s) Oral every 6 hours  acetaminophen   Tablet .. 650 milliGRAM(s) Oral <User Schedule>  acyclovir   Oral Tab/Cap 400 milliGRAM(s) Oral every 8 hours  amLODIPine   Tablet 5 milliGRAM(s) Oral daily  benzonatate 100 milliGRAM(s) Oral every 8 hours  Biotene Dry Mouth Oral Rinse 5 milliLiter(s) Swish and Spit five times a day  cefepime   IVPB      cefepime   IVPB 2000 milliGRAM(s) IV Intermittent every 8 hours  clotrimazole Lozenge 1 Lozenge Oral five times a day  diphenhydrAMINE   Injectable 25 milliGRAM(s) IV Push every 3 weeks  filgrastim-sndz Injectable 480 MICROGram(s) SubCutaneous daily  fluconAZOLE   Tablet 400 milliGRAM(s) Oral daily  folic acid 1 milliGRAM(s) Oral daily  heparin  Infusion 464 Unit(s)/Hr (4.64 mL/Hr) IV Continuous <Continuous>  immune   globulin 10% (GAMMAGARD) IVPB 20 Gram(s) IV Intermittent <User Schedule>  levETIRAcetam 500 milliGRAM(s) Oral two times a day  lidocaine/prilocaine Cream 1 Application(s) Topical daily  losartan 100 milliGRAM(s) Oral daily  magnesium oxide 400 milliGRAM(s) Oral three times a day with meals  multivitamin 1 Tablet(s) Oral daily  mycophenolate mofetil 1000 milliGRAM(s) Oral <User Schedule>  nystatin Powder 1 Application(s) Topical every 8 hours  ondansetron Injectable 8 milliGRAM(s) IV Push every 8 hours  pantoprazole    Tablet 40 milliGRAM(s) Oral before breakfast  polyethylene glycol 3350 17 Gram(s) Oral daily  sodium bicarbonate Mouth Rinse 10 milliLiter(s) Swish and Spit five times a day  sodium chloride 0.9%. 1000 milliLiter(s) (20 mL/Hr) IV Continuous <Continuous>  tacrolimus 2 milliGRAM(s) Oral every 12 hours  ursodiol Capsule 300 milliGRAM(s) Oral two times a day with meals  vancomycin    Solution 125 milliGRAM(s) Oral every 6 hours    MEDICATIONS  (PRN):  acetaminophen   Tablet .. 650 milliGRAM(s) Oral every 6 hours PRN Temp greater or equal to 38C (100.4F), Mild Pain (1 - 3)  acetaminophen   Tablet .. 650 milliGRAM(s) Oral every 6 hours PRN Temp greater or equal to 38C (100.4F)  diphenhydrAMINE   Injectable 25 milliGRAM(s) IV Push every 4 hours PRN Allergy symptoms  docusate sodium 100 milliGRAM(s) Oral three times a day PRN Constipation  FIRST- Mouthwash  BLM 5 milliLiter(s) Swish and Swallow every 3 hours PRN Mouth Care  HYDROmorphone  Injectable 1 milliGRAM(s) IV Push every 4 hours PRN Severe Pain (7 - 10)  metoclopramide Injectable 10 milliGRAM(s) IV Push every 6 hours PRN nausea  oxyCODONE    IR 5 milliGRAM(s) Oral every 6 hours PRN Moderate Pain (4 - 6)  senna 1 Tablet(s) Oral at bedtime PRN Constipation    Pertinent Labs:  02-26 Na140 mmol/L Glu 94 mg/dL K+ 3.7 mmol/L Cr  0.80 mg/dL BUN 13 mg/dL 02-26 Phos 2.7 mg/dL 02-26 Alb 3.4 g/dL      Skin: No edema, skin intact    Estimated Needs:   [x ] no change since previous assessment  [ ] recalculated:       Previous Nutrition Diagnosis:     Predicted suboptimal energy intake and Obesity class 3           Nutrition Diagnosis is [ x] ongoing, predicted suboptimal energy intake escalated to diagnosis below         New Nutrition Diagnosis: [ ] not applicable    Inadequate oral intake related to decreased appetite in setting of nausea and diarrhea as evidenced by pt consuming </=50% of meals in house x ~1 week        Interventions:     Recommend    1. Continue regular diet with glutasolve 1 packet daily, will continue to offer milkshakes as available, will add carnation instant breakfast once daily  2. Continue to encourage po intake and obtain/honor food preferences as able, reviewed consuming binding foods in setting of diarrhea, reinforce importance of adequate po intake, will provide yogurt for all meals per pt request       Monitoring and Evaluation:     [x ] PO intake [ x] Tolerance to diet prescription [ x] weights [x ] follow up per protocol    [ ] other:

## 2019-02-26 NOTE — PROGRESS NOTE ADULT - ATTENDING COMMENTS
58 year old female with PMH [place] chromosome positive ALL diagnosed in 2016 s/p R HyperCVAD X 4 cycles, IT MTX X 2, s/p autologous stem cell transplantation (12/16/16),  who presented in October 2018 with subdural hemorrhage and relapsed disease confirmed by peripheral blood flow cytometry treated with Inotuzumab X 2 cycles.  Bone marrow biopsy on 1/23/19 showed remission with FISH and PCR for BCR-ABL translocation negative on the BM specimen but peripheral blood testing on 1/31 showed .004% BCR-ABL transcript with negative FISH suggesting MRD positivity.  Patient was on Ponatinib, DC 1/31/19. LFT's improved off drug.  Now in CR2    Patient was admitted for haploidentical stem cell transplantation with fludarabine/cytoxan/TBI conditioning. She is s/p HPC transplant, day + 13  Completed high-dose Cytoxan on days +3, +4(GVHD prophylaxis); then begin Tacrolimus, Cellcept on day +5.  Begin Zarxio on day +5    Neutropenic fevers resolved, s/p haplo storm - on Cefepime, culture from 2/14, 2/16 negative.  CXR negative.  On Acyclovir, Fluconazole prophylaxis.  C. Diff diarrhea- continue oral Vancomycin, diarrhea improving  Monitor I/O's/weights- lasix as needed  VOD prophylaxis as per protocol.  Encourage nutrition, mouth care, ambulation. mild nausea today, receiving antiemetics    History of SDH, continue Keppra. CT scan baseline 2/7/19 negative for SDH.   History of HTN continue Losartan.  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  Bone marrow biopsy on 1/23/19 showed remission with FISH and PCR for BCR-ABL translocation negative on the BM specimen but peripheral blood testing on 1/31 showed .004% BCR-ABL transcript with negative FISH suggesting MRD positivity.  Patient was on Ponatinib, DC 1/31/19. LFT's improved off drug.  Now in CR2    Patient was admitted for haploidentical stem cell transplantation with fludarabine/cytoxan/TBI conditioning. She is s/p HPC transplant, day + 13  Completed high-dose Cytoxan on days +3, +4(GVHD prophylaxis); then begin Tacrolimus, Cellcept on day +5.  Begin Zarxio on day +5    Neutropenic fevers resolved, s/p haplo storm - on Cefepime, culture from 2/14, 2/16 negative.  CXR negative.  On Acyclovir, Fluconazole prophylaxis.  C. Diff diarrhea- continue oral Vancomycin, diarrhea improving  Monitor I/O's/weights- lasix as needed  VOD prophylaxis as per protocol.  Mouth care, skin care  Antiemetics as needed    History of SDH, continue Keppra. CT scan baseline 2/7/19 negative for SDH.   History of HTN continue Losartan.  OOB/ambulate

## 2019-02-26 NOTE — PROGRESS NOTE ADULT - SUBJECTIVE AND OBJECTIVE BOX
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    S: Patient seen and examined with HPC Transplant Team:     Denies mouth / tongue / throat pain, dyspnea, cough, nausea, vomiting, diarrhea, abdominal pain     O: Vitals:   Vital Signs Last 24 Hrs  T(C): 36.9 (2019 06:13), Max: 37 (2019 10:15)  T(F): 98.4 (2019 06:13), Max: 98.6 (2019 10:15)  HR: 90 (2019 06:13) (75 - 90)  BP: 148/95 (2019 06:13) (128/95 - 148/95)  BP(mean): --  RR: 18 (2019 06:13) (18 - 19)  SpO2: 97% (2019 06:13) (97% - 100%)    Admit weight: 111.4kg   Daily Weight in k.4 (2019 10:56)  Today's weight:     Intake / Output:    @ 07:01  -  - @ 07:00  --------------------------------------------------------  IN: 2275.2 mL / OUT: 1450 mL / NET: 825.2 mL      PE:   Oropharynx: no erythema or ulcers  Oral Mucositis: n/a                                                       stGstrstastdstest:st st1st CVS: S1S2, RRR  Lungs: CTA throughout bilaterally   Abdomen: soft, NT/ND, normoactive BS x 4Q  Extremities: no edema  Gastric Mucositis: n/a                                                 stGstrstastdstest:st st1st Intestinal Mucositis: n/a                                             stGstrstastdstest:st st1st Skin: no rash  TLC: C/D/I  Neuro: A&O x 3   Pain: chronic back pain       Labs:   CBC Full  -  ( 2019 06:53 )  WBC Count : 0.2 K/uL  Hemoglobin : 8.6 g/dL  Hematocrit : 24.6 %  Platelet Count - Automated : 13 K/uL  Mean Cell Volume : 101.0 fl  Mean Cell Hemoglobin : 35.1 pg  Mean Cell Hemoglobin Concentration : 34.8 gm/dL  Auto Neutrophil # : x  Auto Lymphocyte # : x  Auto Monocyte # : x  Auto Eosinophil # : x  Auto Basophil # : x  Auto Neutrophil % : x  Auto Lymphocyte % : x  Auto Monocyte % : x  Auto Eosinophil % : x  Auto Basophil % : x                          8.6    0.2   )-----------( 13       ( 2019 06:53 )             24.6         140  |  105  |  13  ----------------------------<  94  3.7   |  23  |  0.80    Ca    10.2      2019 06:51  Phos  2.7       Mg     1.5         TPro  6.0  /  Alb  3.4  /  TBili  0.2  /  DBili  x   /  AST  14  /  ALT  18  /  AlkPhos  147<H>      PT/INR - ( 2019 07:05 )   PT: 10.2 sec;   INR: 0.89 ratio         PTT - ( 2019 07:05 )  PTT:38.9 sec  LIVER FUNCTIONS - ( 2019 06:51 )  Alb: 3.4 g/dL / Pro: 6.0 g/dL / ALK PHOS: 147 U/L / ALT: 18 U/L / AST: 14 U/L / GGT: x           Lactate Dehydrogenase, Serum: 152 U/L ( @ 06:51)    Cultures:   Culture Results: urine   <10,000 CFU/ml Normal Urogenital everton present (19 @ 18:41)    Culture Results: blood  No growth at 5 days. (19 @ 18:30)    Culture Results: blood   No growth at 5 days. (19 @ 18:30)    Radiology:   EXAM:  XR CHEST PORTABLE URGENT 1V                        PROCEDURE DATE:  2019    IMPRESSION:  1.  The lungs are clear.      EXAM:  CT BRAIN                        PROCEDURE DATE:  2019    IMPRESSION:  No acute intracranial abnormality is noted. No subdural collections are   visualized. If the patient has new and persistent symptoms, consider   follow-up brain MRI with and without contrast, if there are no MRI or   contrast contraindications.  New moderate mucosal thickening involves the paranasal sinuses. Correlate   for possible sinusitis      Meds:   Antimicrobials:   acyclovir   Oral Tab/Cap 400 milliGRAM(s) Oral every 8 hours  cefepime   IVPB      cefepime   IVPB 2000 milliGRAM(s) IV Intermittent every 8 hours  clotrimazole Lozenge 1 Lozenge Oral five times a day  fluconAZOLE   Tablet 400 milliGRAM(s) Oral daily  vancomycin    Solution 125 milliGRAM(s) Oral every 6 hours      Heme / Onc:   heparin  Infusion 464 Unit(s)/Hr IV Continuous <Continuous>      GI:  docusate sodium 100 milliGRAM(s) Oral three times a day PRN  pantoprazole    Tablet 40 milliGRAM(s) Oral before breakfast  polyethylene glycol 3350 17 Gram(s) Oral daily  senna 1 Tablet(s) Oral at bedtime PRN  sodium bicarbonate Mouth Rinse 10 milliLiter(s) Swish and Spit five times a day  ursodiol Capsule 300 milliGRAM(s) Oral two times a day with meals      Cardiovascular:   losartan 100 milliGRAM(s) Oral daily      Immunologic:   filgrastim-sndz Injectable 480 MICROGram(s) SubCutaneous daily  immune   globulin 10% (GAMMAGARD) IVPB 20 Gram(s) IV Intermittent <User Schedule>  mycophenolate mofetil 1000 milliGRAM(s) Oral <User Schedule>  tacrolimus 2 milliGRAM(s) Oral every 12 hours      Other medications:   acetaminophen   Tablet .. 650 milliGRAM(s) Oral every 6 hours  acetaminophen   Tablet .. 650 milliGRAM(s) Oral <User Schedule>  benzonatate 100 milliGRAM(s) Oral every 8 hours  Biotene Dry Mouth Oral Rinse 5 milliLiter(s) Swish and Spit five times a day  diphenhydrAMINE   Injectable 25 milliGRAM(s) IV Push every 3 weeks  folic acid 1 milliGRAM(s) Oral daily  levETIRAcetam 500 milliGRAM(s) Oral two times a day  lidocaine/prilocaine Cream 1 Application(s) Topical daily  magnesium oxide 400 milliGRAM(s) Oral three times a day with meals  magnesium sulfate  IVPB 2 Gram(s) IV Intermittent once  multivitamin 1 Tablet(s) Oral daily  nystatin Powder 1 Application(s) Topical every 8 hours  ondansetron Injectable 8 milliGRAM(s) IV Push every 8 hours  sodium chloride 0.9%. 1000 milliLiter(s) IV Continuous <Continuous>      PRN:   acetaminophen   Tablet .. 650 milliGRAM(s) Oral every 6 hours PRN  acetaminophen   Tablet .. 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 diff - started vancomycin 125mg po q hours   Platelet refractory - platelets dispensed as 1/2 units to be given over 3 hours q 12 hours     1. Infectious Disease:   acyclovir   Oral Tab/Cap 400 milliGRAM(s) Oral every 8 hours  cefepime   IVPB      cefepime   IVPB 2000 milliGRAM(s) IV Intermittent every 8 hours  clotrimazole Lozenge 1 Lozenge Oral five times a day  fluconAZOLE   Tablet 400 milliGRAM(s) Oral daily  vancomycin    Solution 125 milliGRAM(s) Oral every 6 hours    2. VOD Prophylaxis: Actigall, Glutamine, Heparin (dosed at 100 units / kg / day)     3. GI Prophylaxis:    pantoprazole    Tablet 40 milliGRAM(s) Oral before breakfast    4. Mouthcare - NS / NaHCO3 rinses, Mycelex, Caphosol; Skin care     5. GVHD prophylaxis   mycophenolate mofetil 1000 milliGRAM(s) Oral <User Schedule>  tacrolimus 2 milliGRAM(s) Oral every 12 hours    6. Transfuse & replete electrolytes prn   magnesium oxide 400 milliGRAM(s) Oral three times a day with meals  magnesium sulfate  IVPB 2 Gram(s) IV Intermittent once    7. IV hydration, daily weights, strict I&O, prn diuresis     8. PO intake as tolerated, nutrition follow up as needed, MVI, folic acid     9. Antiemetics, anti-diarrhea medications:   metoclopramide Injectable 10 milliGRAM(s) IV Push every 6 hours PRN  ondansetron Injectable 8 milliGRAM(s) IV Push every 8 hours    10. OOB as tolerated, physical therapy consult if needed     11. Monitor coags / fibrinogen 2x week, vitamin K as needed     12. Monitor closely for clinical changes, monitor for fevers     13. Emotional support provided, plan of care discussed and questions addressed     14. Patient education done regarding plan of care, restrictions and discharge planning     15. Continue regular social work input     I have written the above note for Dr. Logan who performed service with me in the room.   Janett Barrow NP-C ([phone])    I have seen and examined patient with NP, I agree with above note as scribed. 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    S: Patient seen and examined with HPC Transplant Team:     Denies mouth / tongue / throat pain, dyspnea, cough, nausea, vomiting, diarrhea, abdominal pain     O: Vitals:   Vital Signs Last 24 Hrs  T(C): 36.9 (2019 06:13), Max: 37 (2019 10:15)  T(F): 98.4 (2019 06:13), Max: 98.6 (2019 10:15)  HR: 90 (2019 06:13) (75 - 90)  BP: 148/95 (2019 06:13) (128/95 - 148/95)  BP(mean): --  RR: 18 (2019 06:13) (18 - 19)  SpO2: 97% (2019 06:13) (97% - 100%)    Admit weight: 111.4kg   Daily Weight in k.4 (2019 10:56)  Today's weight: 111.9kg     Intake / Output:    @ 07:01  -  - @ 07:00  --------------------------------------------------------  IN: 2275.2 mL / OUT: 1450 mL / NET: 825.2 mL      PE:   Oropharynx: no erythema or ulcers  Oral Mucositis: n/a                                                       stGstrstastdstest:st st1st CVS: S1S2, RRR  Lungs: CTA throughout bilaterally   Abdomen: soft, NT/ND, normoactive BS x 4Q  Extremities: no edema  Gastric Mucositis: n/a                                                 stGstrstastdstest:st st1st Intestinal Mucositis: n/a                                             stGstrstastdstest:st st1st Skin: no rash  TLC: C/D/I  Neuro: A&O x 3   Pain: chronic back pain       Labs:   CBC Full  -  ( 2019 06:53 )  WBC Count : 0.2 K/uL  Hemoglobin : 8.6 g/dL  Hematocrit : 24.6 %  Platelet Count - Automated : 13 K/uL  Mean Cell Volume : 101.0 fl  Mean Cell Hemoglobin : 35.1 pg  Mean Cell Hemoglobin Concentration : 34.8 gm/dL  Auto Neutrophil # : x  Auto Lymphocyte # : x  Auto Monocyte # : x  Auto Eosinophil # : x  Auto Basophil # : x  Auto Neutrophil % : x  Auto Lymphocyte % : x  Auto Monocyte % : x  Auto Eosinophil % : x  Auto Basophil % : x                          8.6    0.2   )-----------( 13       ( 2019 06:53 )             24.6         140  |  105  |  13  ----------------------------<  94  3.7   |  23  |  0.80    Ca    10.2      2019 06:51  Phos  2.7       Mg     1.5         TPro  6.0  /  Alb  3.4  /  TBili  0.2  /  DBili  x   /  AST  14  /  ALT  18  /  AlkPhos  147<H>      PT/INR - ( 2019 07:05 )   PT: 10.2 sec;   INR: 0.89 ratio         PTT - ( 2019 07:05 )  PTT:38.9 sec  LIVER FUNCTIONS - ( 2019 06:51 )  Alb: 3.4 g/dL / Pro: 6.0 g/dL / ALK PHOS: 147 U/L / ALT: 18 U/L / AST: 14 U/L / GGT: x           Lactate Dehydrogenase, Serum: 152 U/L ( @ 06:51)    Cultures:   Culture Results: urine   <10,000 CFU/ml Normal Urogenital everton present (19 @ 18:41)    Culture Results: blood  No growth at 5 days. (19 @ 18:30)    Culture Results: blood   No growth at 5 days. (19 @ 18:30)    Radiology:   EXAM:  XR CHEST PORTABLE URGENT 1V                        PROCEDURE DATE:  2019    IMPRESSION:  1.  The lungs are clear.      EXAM:  CT BRAIN                        PROCEDURE DATE:  2019    IMPRESSION:  No acute intracranial abnormality is noted. No subdural collections are   visualized. If the patient has new and persistent symptoms, consider   follow-up brain MRI with and without contrast, if there are no MRI or   contrast contraindications.  New moderate mucosal thickening involves the paranasal sinuses. Correlate   for possible sinusitis      Meds:   Antimicrobials:   acyclovir   Oral Tab/Cap 400 milliGRAM(s) Oral every 8 hours  cefepime   IVPB      cefepime   IVPB 2000 milliGRAM(s) IV Intermittent every 8 hours  clotrimazole Lozenge 1 Lozenge Oral five times a day  fluconAZOLE   Tablet 400 milliGRAM(s) Oral daily  vancomycin    Solution 125 milliGRAM(s) Oral every 6 hours      Heme / Onc:   heparin  Infusion 464 Unit(s)/Hr IV Continuous <Continuous>      GI:  docusate sodium 100 milliGRAM(s) Oral three times a day PRN  pantoprazole    Tablet 40 milliGRAM(s) Oral before breakfast  polyethylene glycol 3350 17 Gram(s) Oral daily  senna 1 Tablet(s) Oral at bedtime PRN  sodium bicarbonate Mouth Rinse 10 milliLiter(s) Swish and Spit five times a day  ursodiol Capsule 300 milliGRAM(s) Oral two times a day with meals      Cardiovascular:   losartan 100 milliGRAM(s) Oral daily      Immunologic:   filgrastim-sndz Injectable 480 MICROGram(s) SubCutaneous daily  immune   globulin 10% (GAMMAGARD) IVPB 20 Gram(s) IV Intermittent <User Schedule>  mycophenolate mofetil 1000 milliGRAM(s) Oral <User Schedule>  tacrolimus 2 milliGRAM(s) Oral every 12 hours      Other medications:   acetaminophen   Tablet .. 650 milliGRAM(s) Oral every 6 hours  acetaminophen   Tablet .. 650 milliGRAM(s) Oral <User Schedule>  benzonatate 100 milliGRAM(s) Oral every 8 hours  Biotene Dry Mouth Oral Rinse 5 milliLiter(s) Swish and Spit five times a day  diphenhydrAMINE   Injectable 25 milliGRAM(s) IV Push every 3 weeks  folic acid 1 milliGRAM(s) Oral daily  levETIRAcetam 500 milliGRAM(s) Oral two times a day  lidocaine/prilocaine Cream 1 Application(s) Topical daily  magnesium oxide 400 milliGRAM(s) Oral three times a day with meals  magnesium sulfate  IVPB 2 Gram(s) IV Intermittent once  multivitamin 1 Tablet(s) Oral daily  nystatin Powder 1 Application(s) Topical every 8 hours  ondansetron Injectable 8 milliGRAM(s) IV Push every 8 hours  sodium chloride 0.9%. 1000 milliLiter(s) IV Continuous <Continuous>      PRN:   acetaminophen   Tablet .. 650 milliGRAM(s) Oral every 6 hours PRN  acetaminophen   Tablet .. 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 diff - started vancomycin 125mg po q hours   Platelet refractory - platelets dispensed as 1/2 units to be given over 3 hours q 12 hours     1. Infectious Disease:   acyclovir   Oral Tab/Cap 400 milliGRAM(s) Oral every 8 hours  cefepime   IVPB      cefepime   IVPB 2000 milliGRAM(s) IV Intermittent every 8 hours  clotrimazole Lozenge 1 Lozenge Oral five times a day  fluconAZOLE   Tablet 400 milliGRAM(s) Oral daily  vancomycin    Solution 125 milliGRAM(s) Oral every 6 hours    2. VOD Prophylaxis: Actigall, Glutamine, Heparin (dosed at 100 units / kg / day)     3. GI Prophylaxis:    pantoprazole    Tablet 40 milliGRAM(s) Oral before breakfast    4. Mouthcare - NS / NaHCO3 rinses, Mycelex, Caphosol; Skin care     5. GVHD prophylaxis   mycophenolate mofetil 1000 milliGRAM(s) Oral <User Schedule>  tacrolimus 2 milliGRAM(s) Oral every 12 hours    6. Transfuse & replete electrolytes prn   magnesium oxide 400 milliGRAM(s) Oral three times a day with meals  magnesium sulfate  IVPB 2 Gram(s) IV Intermittent once    7. IV hydration, daily weights, strict I&O, prn diuresis     8. PO intake as tolerated, nutrition follow up as needed, MVI, folic acid     9. Antiemetics, anti-diarrhea medications:   metoclopramide Injectable 10 milliGRAM(s) IV Push every 6 hours PRN  ondansetron Injectable 8 milliGRAM(s) IV Push every 8 hours    10. OOB as tolerated, physical therapy consult if needed     11. Monitor coags / fibrinogen 2x week, vitamin K as needed     12. Monitor closely for clinical changes, monitor for fevers     13. Emotional support provided, plan of care discussed and questions addressed     14. Patient education done regarding plan of care, restrictions and discharge planning     15. Continue regular social work input     I have written the above note for Dr. Logan who performed service with me in the room.   Janett Barrow NP-C ([phone])    I have seen and examined patient with NP, I agree with above note as scribed. 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    S: Patient seen and examined with HPC Transplant Team:   + chronic back pain - relieved with pain meds   + bilateral knee pain - relieved with pain meds   + fatigue  + cough - much improved   Denies mouth / tongue / throat pain, dyspnea, nausea, vomiting, diarrhea, abdominal pain     O: Vitals:   Vital Signs Last 24 Hrs  T(C): 36.9 (2019 06:13), Max: 37 (2019 10:15)  T(F): 98.4 (2019 06:13), Max: 98.6 (2019 10:15)  HR: 90 (2019 06:13) (75 - 90)  BP: 148/95 (2019 06:13) (128/95 - 148/95)  BP(mean): --  RR: 18 (2019 06:13) (18 - 19)  SpO2: 97% (2019 06:13) (97% - 100%)    Admit weight: 111.4kg   Daily Weight in k.4 (2019 10:56)  Today's weight: 111.9kg     Intake / Output:    @ 07:01  -   @ 07:00  --------------------------------------------------------  IN: 2275.2 mL / OUT: 1450 mL / NET: 825.2 mL      PE:   Oropharynx: no erythema or ulcers  Oral Mucositis: n/a                                                       stGstrstastdstest:st st1st CVS: S1S2, RRR  Lungs: CTA throughout bilaterally   Abdomen: soft, NT/ND, normoactive BS x 4Q  Extremities: no edema  Gastric Mucositis: n/a                                                 stGstrstastdstest:st st1st Intestinal Mucositis: n/a                                             stGstrstastdstest:st st1st Skin: no rash  TLC: C/D/I  Neuro: A&O x 3   Pain: chronic back pain       Labs:   CBC Full  -  ( 2019 06:53 )  WBC Count : 0.2 K/uL  Hemoglobin : 8.6 g/dL  Hematocrit : 24.6 %  Platelet Count - Automated : 13 K/uL  Mean Cell Volume : 101.0 fl  Mean Cell Hemoglobin : 35.1 pg  Mean Cell Hemoglobin Concentration : 34.8 gm/dL  Auto Neutrophil # : x  Auto Lymphocyte # : x  Auto Monocyte # : x  Auto Eosinophil # : x  Auto Basophil # : x  Auto Neutrophil % : x  Auto Lymphocyte % : x  Auto Monocyte % : x  Auto Eosinophil % : x  Auto Basophil % : x                          8.6    0.2   )-----------( 13       ( 2019 06:53 )             24.6         140  |  105  |  13  ----------------------------<  94  3.7   |  23  |  0.80    Ca    10.2      2019 06:51  Phos  2.7       Mg     1.5         TPro  6.0  /  Alb  3.4  /  TBili  0.2  /  DBili  x   /  AST  14  /  ALT  18  /  AlkPhos  147<H>      PT/INR - ( 2019 07:05 )   PT: 10.2 sec;   INR: 0.89 ratio         PTT - ( 2019 07:05 )  PTT:38.9 sec  LIVER FUNCTIONS - ( 2019 06:51 )  Alb: 3.4 g/dL / Pro: 6.0 g/dL / ALK PHOS: 147 U/L / ALT: 18 U/L / AST: 14 U/L / GGT: x           Lactate Dehydrogenase, Serum: 152 U/L ( @ 06:51)    Cultures:   Culture Results: urine   <10,000 CFU/ml Normal Urogenital everton present (19 @ 18:41)    Culture Results: blood  No growth at 5 days. (19 @ 18:30)    Culture Results: blood   No growth at 5 days. (19 @ 18:30)    Radiology:   EXAM:  XR CHEST PORTABLE URGENT 1V                        PROCEDURE DATE:  2019    IMPRESSION:  1.  The lungs are clear.      EXAM:  CT BRAIN                        PROCEDURE DATE:  2019    IMPRESSION:  No acute intracranial abnormality is noted. No subdural collections are   visualized. If the patient has new and persistent symptoms, consider   follow-up brain MRI with and without contrast, if there are no MRI or   contrast contraindications.  New moderate mucosal thickening involves the paranasal sinuses. Correlate   for possible sinusitis      Meds:   Antimicrobials:   acyclovir   Oral Tab/Cap 400 milliGRAM(s) Oral every 8 hours  cefepime   IVPB      cefepime   IVPB 2000 milliGRAM(s) IV Intermittent every 8 hours  clotrimazole Lozenge 1 Lozenge Oral five times a day  fluconAZOLE   Tablet 400 milliGRAM(s) Oral daily  vancomycin    Solution 125 milliGRAM(s) Oral every 6 hours      Heme / Onc:   heparin  Infusion 464 Unit(s)/Hr IV Continuous <Continuous>      GI:  docusate sodium 100 milliGRAM(s) Oral three times a day PRN  pantoprazole    Tablet 40 milliGRAM(s) Oral before breakfast  polyethylene glycol 3350 17 Gram(s) Oral daily  senna 1 Tablet(s) Oral at bedtime PRN  sodium bicarbonate Mouth Rinse 10 milliLiter(s) Swish and Spit five times a day  ursodiol Capsule 300 milliGRAM(s) Oral two times a day with meals      Cardiovascular:   losartan 100 milliGRAM(s) Oral daily      Immunologic:   filgrastim-sndz Injectable 480 MICROGram(s) SubCutaneous daily  immune   globulin 10% (GAMMAGARD) IVPB 20 Gram(s) IV Intermittent <User Schedule>  mycophenolate mofetil 1000 milliGRAM(s) Oral <User Schedule>  tacrolimus 2 milliGRAM(s) Oral every 12 hours      Other medications:   acetaminophen   Tablet .. 650 milliGRAM(s) Oral every 6 hours  acetaminophen   Tablet .. 650 milliGRAM(s) Oral <User Schedule>  benzonatate 100 milliGRAM(s) Oral every 8 hours  Biotene Dry Mouth Oral Rinse 5 milliLiter(s) Swish and Spit five times a day  diphenhydrAMINE   Injectable 25 milliGRAM(s) IV Push every 3 weeks  folic acid 1 milliGRAM(s) Oral daily  levETIRAcetam 500 milliGRAM(s) Oral two times a day  lidocaine/prilocaine Cream 1 Application(s) Topical daily  magnesium oxide 400 milliGRAM(s) Oral three times a day with meals  magnesium sulfate  IVPB 2 Gram(s) IV Intermittent once  multivitamin 1 Tablet(s) Oral daily  nystatin Powder 1 Application(s) Topical every 8 hours  ondansetron Injectable 8 milliGRAM(s) IV Push every 8 hours  sodium chloride 0.9%. 1000 milliLiter(s) IV Continuous <Continuous>      PRN:   acetaminophen   Tablet .. 650 milliGRAM(s) Oral every 6 hours PRN  acetaminophen   Tablet .. 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 diff - started vancomycin 125mg po q hours   Platelet refractory - platelets dispensed as 1/2 units to be given over 3 hours q 12 hours     1. Infectious Disease:   acyclovir   Oral Tab/Cap 400 milliGRAM(s) Oral every 8 hours  cefepime   IVPB      cefepime   IVPB 2000 milliGRAM(s) IV Intermittent every 8 hours  clotrimazole Lozenge 1 Lozenge Oral five times a day  fluconAZOLE   Tablet 400 milliGRAM(s) Oral daily  vancomycin    Solution 125 milliGRAM(s) Oral every 6 hours    2. VOD Prophylaxis: Actigall, Glutamine, Heparin (dosed at 100 units / kg / day)     3. GI Prophylaxis:    pantoprazole    Tablet 40 milliGRAM(s) Oral before breakfast    4. Mouthcare - NS / NaHCO3 rinses, Mycelex, Caphosol; Skin care     5. GVHD prophylaxis   mycophenolate mofetil 1000 milliGRAM(s) Oral <User Schedule>  tacrolimus 2 milliGRAM(s) Oral every 12 hours    6. Transfuse & replete electrolytes prn   magnesium oxide 400 milliGRAM(s) Oral three times a day with meals  magnesium sulfate  IVPB 2 Gram(s) IV Intermittent once    7. IV hydration, daily weights, strict I&O, prn diuresis     8. PO intake as tolerated, nutrition follow up as needed, MVI, folic acid     9. Antiemetics, anti-diarrhea medications:   metoclopramide Injectable 10 milliGRAM(s) IV Push every 6 hours PRN  ondansetron Injectable 8 milliGRAM(s) IV Push every 8 hours    10. OOB as tolerated, physical therapy consult if needed     11. Monitor coags / fibrinogen 2x week, vitamin K as needed     12. Monitor closely for clinical changes, monitor for fevers     13. Emotional support provided, plan of care discussed and questions addressed     14. Patient education done regarding plan of care, restrictions and discharge planning     15. Continue regular social work input     I have written the above note for Dr. Logan who performed service with me in the room.   Janett Barrow NP-C ([phone])    I have seen and examined patient with NP, I agree with above note as scribed.

## 2019-02-27 LAB
ALBUMIN SERPL ELPH-MCNC: 3.4 G/DL — SIGNIFICANT CHANGE UP (ref 3.3–5)
ALP SERPL-CCNC: 150 U/L — HIGH (ref 40–120)
ALT FLD-CCNC: 17 U/L — SIGNIFICANT CHANGE UP (ref 10–45)
ANION GAP SERPL CALC-SCNC: 13 MMOL/L — SIGNIFICANT CHANGE UP (ref 5–17)
ANISOCYTOSIS BLD QL: SLIGHT — SIGNIFICANT CHANGE UP
AST SERPL-CCNC: 14 U/L — SIGNIFICANT CHANGE UP (ref 10–40)
BASOPHILS # BLD AUTO: 0 K/UL — SIGNIFICANT CHANGE UP (ref 0–0.2)
BASOPHILS NFR BLD AUTO: 0 % — SIGNIFICANT CHANGE UP (ref 0–2)
BILIRUB DIRECT SERPL-MCNC: <0.1 MG/DL — SIGNIFICANT CHANGE UP (ref 0–0.2)
BILIRUB INDIRECT FLD-MCNC: >0.1 MG/DL — LOW (ref 0.2–1)
BILIRUB SERPL-MCNC: 0.2 MG/DL — SIGNIFICANT CHANGE UP (ref 0.2–1.2)
BLASTS # FLD: 1 % — HIGH (ref 0–0)
BLD GP AB SCN SERPL QL: NEGATIVE — SIGNIFICANT CHANGE UP
BUN SERPL-MCNC: 12 MG/DL — SIGNIFICANT CHANGE UP (ref 7–23)
CALCIUM SERPL-MCNC: 10.5 MG/DL — SIGNIFICANT CHANGE UP (ref 8.4–10.5)
CHLORIDE SERPL-SCNC: 107 MMOL/L — SIGNIFICANT CHANGE UP (ref 96–108)
CO2 SERPL-SCNC: 24 MMOL/L — SIGNIFICANT CHANGE UP (ref 22–31)
CREAT SERPL-MCNC: 0.8 MG/DL — SIGNIFICANT CHANGE UP (ref 0.5–1.3)
CULTURE RESULTS: NO GROWTH — SIGNIFICANT CHANGE UP
CULTURE RESULTS: SIGNIFICANT CHANGE UP
DACRYOCYTES BLD QL SMEAR: SLIGHT — SIGNIFICANT CHANGE UP
DOHLE BOD BLD QL SMEAR: PRESENT — SIGNIFICANT CHANGE UP
ELLIPTOCYTES BLD QL SMEAR: SLIGHT — SIGNIFICANT CHANGE UP
EOSINOPHIL # BLD AUTO: 0 K/UL — SIGNIFICANT CHANGE UP (ref 0–0.5)
EOSINOPHIL NFR BLD AUTO: 0 % — SIGNIFICANT CHANGE UP (ref 0–6)
GLUCOSE SERPL-MCNC: 89 MG/DL — SIGNIFICANT CHANGE UP (ref 70–99)
HCT VFR BLD CALC: 24.9 % — LOW (ref 34.5–45)
HGB BLD-MCNC: 8.5 G/DL — LOW (ref 11.5–15.5)
LDH SERPL L TO P-CCNC: 188 U/L — SIGNIFICANT CHANGE UP (ref 50–242)
LYMPHOCYTES # BLD AUTO: 0.1 K/UL — LOW (ref 1–3.3)
LYMPHOCYTES # BLD AUTO: 4 % — LOW (ref 13–44)
MACROCYTES BLD QL: SLIGHT — SIGNIFICANT CHANGE UP
MAGNESIUM SERPL-MCNC: 1.8 MG/DL — SIGNIFICANT CHANGE UP (ref 1.6–2.6)
MCHC RBC-ENTMCNC: 34.1 GM/DL — SIGNIFICANT CHANGE UP (ref 32–36)
MCHC RBC-ENTMCNC: 34.6 PG — HIGH (ref 27–34)
MCV RBC AUTO: 102 FL — HIGH (ref 80–100)
METAMYELOCYTES # FLD: 1 % — HIGH (ref 0–0)
MICROCYTES BLD QL: SLIGHT — SIGNIFICANT CHANGE UP
MONOCYTES # BLD AUTO: 0.4 K/UL — SIGNIFICANT CHANGE UP (ref 0–0.9)
MONOCYTES NFR BLD AUTO: 28 % — HIGH (ref 2–14)
MYELOCYTES NFR BLD: 1 % — HIGH (ref 0–0)
NEUTROPHILS # BLD AUTO: 0.8 K/UL — LOW (ref 1.8–7.4)
NEUTROPHILS NFR BLD AUTO: 51 % — SIGNIFICANT CHANGE UP (ref 43–77)
NEUTS BAND # BLD: 13 % — HIGH (ref 0–8)
NRBC # BLD: 4 /100 — HIGH (ref 0–0)
OVALOCYTES BLD QL SMEAR: SLIGHT — SIGNIFICANT CHANGE UP
PHOSPHATE SERPL-MCNC: 2.3 MG/DL — LOW (ref 2.5–4.5)
PLAT MORPH BLD: NORMAL — SIGNIFICANT CHANGE UP
PLATELET # BLD AUTO: 14 K/UL — CRITICAL LOW (ref 150–400)
POIKILOCYTOSIS BLD QL AUTO: SLIGHT — SIGNIFICANT CHANGE UP
POLYCHROMASIA BLD QL SMEAR: SLIGHT — SIGNIFICANT CHANGE UP
POTASSIUM SERPL-MCNC: 3.7 MMOL/L — SIGNIFICANT CHANGE UP (ref 3.5–5.3)
POTASSIUM SERPL-SCNC: 3.7 MMOL/L — SIGNIFICANT CHANGE UP (ref 3.5–5.3)
PROMYELOCYTES # FLD: 1 % — HIGH (ref 0–0)
PROT SERPL-MCNC: 5.7 G/DL — LOW (ref 6–8.3)
RBC # BLD: 2.45 M/UL — LOW (ref 3.8–5.2)
RBC # FLD: 12.3 % — SIGNIFICANT CHANGE UP (ref 10.3–14.5)
RBC BLD AUTO: ABNORMAL
RH IG SCN BLD-IMP: POSITIVE — SIGNIFICANT CHANGE UP
SODIUM SERPL-SCNC: 144 MMOL/L — SIGNIFICANT CHANGE UP (ref 135–145)
SPECIMEN SOURCE: SIGNIFICANT CHANGE UP
SPECIMEN SOURCE: SIGNIFICANT CHANGE UP
WBC # BLD: 1.3 K/UL — LOW (ref 3.8–10.5)
WBC # FLD AUTO: 1.3 K/UL — LOW (ref 3.8–10.5)

## 2019-02-27 PROCEDURE — 99291 CRITICAL CARE FIRST HOUR: CPT

## 2019-02-27 RX ORDER — LORATADINE 10 MG/1
10 TABLET ORAL DAILY
Qty: 0 | Refills: 0 | Status: DISCONTINUED | OUTPATIENT
Start: 2019-02-27 | End: 2019-03-04

## 2019-02-27 RX ORDER — POTASSIUM PHOSPHATE, MONOBASIC POTASSIUM PHOSPHATE, DIBASIC 236; 224 MG/ML; MG/ML
15 INJECTION, SOLUTION INTRAVENOUS ONCE
Qty: 0 | Refills: 0 | Status: COMPLETED | OUTPATIENT
Start: 2019-02-27 | End: 2019-02-27

## 2019-02-27 RX ADMIN — Medication 1 TABLET(S): at 14:22

## 2019-02-27 RX ADMIN — HYDROMORPHONE HYDROCHLORIDE 1 MILLIGRAM(S): 2 INJECTION INTRAMUSCULAR; INTRAVENOUS; SUBCUTANEOUS at 05:00

## 2019-02-27 RX ADMIN — HEPARIN SODIUM 4.64 UNIT(S)/HR: 5000 INJECTION INTRAVENOUS; SUBCUTANEOUS at 23:02

## 2019-02-27 RX ADMIN — Medication 5 MILLILITER(S): at 23:01

## 2019-02-27 RX ADMIN — Medication 100 MILLIGRAM(S): at 05:34

## 2019-02-27 RX ADMIN — Medication 10 MILLILITER(S): at 09:07

## 2019-02-27 RX ADMIN — Medication 10 MILLIGRAM(S): at 09:20

## 2019-02-27 RX ADMIN — HYDROMORPHONE HYDROCHLORIDE 1 MILLIGRAM(S): 2 INJECTION INTRAMUSCULAR; INTRAVENOUS; SUBCUTANEOUS at 02:00

## 2019-02-27 RX ADMIN — MYCOPHENOLATE MOFETIL 1000 MILLIGRAM(S): 250 CAPSULE ORAL at 21:17

## 2019-02-27 RX ADMIN — ONDANSETRON 8 MILLIGRAM(S): 8 TABLET, FILM COATED ORAL at 15:19

## 2019-02-27 RX ADMIN — URSODIOL 300 MILLIGRAM(S): 250 TABLET, FILM COATED ORAL at 17:38

## 2019-02-27 RX ADMIN — Medication 5 MILLILITER(S): at 19:48

## 2019-02-27 RX ADMIN — Medication 10 MILLILITER(S): at 15:19

## 2019-02-27 RX ADMIN — Medication 10 MILLILITER(S): at 23:01

## 2019-02-27 RX ADMIN — MAGNESIUM OXIDE 400 MG ORAL TABLET 400 MILLIGRAM(S): 241.3 TABLET ORAL at 17:37

## 2019-02-27 RX ADMIN — HYDROMORPHONE HYDROCHLORIDE 1 MILLIGRAM(S): 2 INJECTION INTRAMUSCULAR; INTRAVENOUS; SUBCUTANEOUS at 20:00

## 2019-02-27 RX ADMIN — Medication 100 MILLIGRAM(S): at 15:19

## 2019-02-27 RX ADMIN — Medication 10 MILLILITER(S): at 14:21

## 2019-02-27 RX ADMIN — HYDROMORPHONE HYDROCHLORIDE 1 MILLIGRAM(S): 2 INJECTION INTRAMUSCULAR; INTRAVENOUS; SUBCUTANEOUS at 11:15

## 2019-02-27 RX ADMIN — Medication 1 LOZENGE: at 15:19

## 2019-02-27 RX ADMIN — Medication 10 MILLILITER(S): at 00:48

## 2019-02-27 RX ADMIN — ONDANSETRON 8 MILLIGRAM(S): 8 TABLET, FILM COATED ORAL at 21:17

## 2019-02-27 RX ADMIN — MYCOPHENOLATE MOFETIL 1000 MILLIGRAM(S): 250 CAPSULE ORAL at 05:33

## 2019-02-27 RX ADMIN — ONDANSETRON 8 MILLIGRAM(S): 8 TABLET, FILM COATED ORAL at 05:32

## 2019-02-27 RX ADMIN — Medication 5 MILLILITER(S): at 09:07

## 2019-02-27 RX ADMIN — TACROLIMUS 2 MILLIGRAM(S): 5 CAPSULE ORAL at 05:33

## 2019-02-27 RX ADMIN — Medication 1 LOZENGE: at 19:48

## 2019-02-27 RX ADMIN — HYDROMORPHONE HYDROCHLORIDE 1 MILLIGRAM(S): 2 INJECTION INTRAMUSCULAR; INTRAVENOUS; SUBCUTANEOUS at 20:16

## 2019-02-27 RX ADMIN — Medication 1 LOZENGE: at 14:21

## 2019-02-27 RX ADMIN — PANTOPRAZOLE SODIUM 40 MILLIGRAM(S): 20 TABLET, DELAYED RELEASE ORAL at 05:33

## 2019-02-27 RX ADMIN — LEVETIRACETAM 500 MILLIGRAM(S): 250 TABLET, FILM COATED ORAL at 17:38

## 2019-02-27 RX ADMIN — HYDROMORPHONE HYDROCHLORIDE 1 MILLIGRAM(S): 2 INJECTION INTRAMUSCULAR; INTRAVENOUS; SUBCUTANEOUS at 17:15

## 2019-02-27 RX ADMIN — Medication 400 MILLIGRAM(S): at 15:18

## 2019-02-27 RX ADMIN — Medication 1 LOZENGE: at 23:01

## 2019-02-27 RX ADMIN — Medication 10 MILLILITER(S): at 19:47

## 2019-02-27 RX ADMIN — FLUCONAZOLE 400 MILLIGRAM(S): 150 TABLET ORAL at 14:23

## 2019-02-27 RX ADMIN — HYDROMORPHONE HYDROCHLORIDE 1 MILLIGRAM(S): 2 INJECTION INTRAMUSCULAR; INTRAVENOUS; SUBCUTANEOUS at 02:15

## 2019-02-27 RX ADMIN — NYSTATIN CREAM 1 APPLICATION(S): 100000 CREAM TOPICAL at 21:18

## 2019-02-27 RX ADMIN — HYDROMORPHONE HYDROCHLORIDE 1 MILLIGRAM(S): 2 INJECTION INTRAMUSCULAR; INTRAVENOUS; SUBCUTANEOUS at 07:45

## 2019-02-27 RX ADMIN — HYDROMORPHONE HYDROCHLORIDE 1 MILLIGRAM(S): 2 INJECTION INTRAMUSCULAR; INTRAVENOUS; SUBCUTANEOUS at 17:00

## 2019-02-27 RX ADMIN — Medication 5 MILLILITER(S): at 15:19

## 2019-02-27 RX ADMIN — Medication 125 MILLIGRAM(S): at 17:35

## 2019-02-27 RX ADMIN — HYDROMORPHONE HYDROCHLORIDE 1 MILLIGRAM(S): 2 INJECTION INTRAMUSCULAR; INTRAVENOUS; SUBCUTANEOUS at 23:00

## 2019-02-27 RX ADMIN — URSODIOL 300 MILLIGRAM(S): 250 TABLET, FILM COATED ORAL at 09:07

## 2019-02-27 RX ADMIN — Medication 125 MILLIGRAM(S): at 00:49

## 2019-02-27 RX ADMIN — Medication 5 MILLILITER(S): at 00:48

## 2019-02-27 RX ADMIN — MAGNESIUM OXIDE 400 MG ORAL TABLET 400 MILLIGRAM(S): 241.3 TABLET ORAL at 09:07

## 2019-02-27 RX ADMIN — CEFEPIME 100 MILLIGRAM(S): 1 INJECTION, POWDER, FOR SOLUTION INTRAMUSCULAR; INTRAVENOUS at 05:32

## 2019-02-27 RX ADMIN — NYSTATIN CREAM 1 APPLICATION(S): 100000 CREAM TOPICAL at 15:20

## 2019-02-27 RX ADMIN — HYDROMORPHONE HYDROCHLORIDE 1 MILLIGRAM(S): 2 INJECTION INTRAMUSCULAR; INTRAVENOUS; SUBCUTANEOUS at 08:00

## 2019-02-27 RX ADMIN — Medication 125 MILLIGRAM(S): at 05:32

## 2019-02-27 RX ADMIN — LEVETIRACETAM 500 MILLIGRAM(S): 250 TABLET, FILM COATED ORAL at 05:33

## 2019-02-27 RX ADMIN — LOSARTAN POTASSIUM 100 MILLIGRAM(S): 100 TABLET, FILM COATED ORAL at 05:33

## 2019-02-27 RX ADMIN — CEFEPIME 100 MILLIGRAM(S): 1 INJECTION, POWDER, FOR SOLUTION INTRAMUSCULAR; INTRAVENOUS at 14:18

## 2019-02-27 RX ADMIN — Medication 400 MILLIGRAM(S): at 21:17

## 2019-02-27 RX ADMIN — Medication 400 MILLIGRAM(S): at 05:32

## 2019-02-27 RX ADMIN — Medication 1 LOZENGE: at 00:48

## 2019-02-27 RX ADMIN — HYDROMORPHONE HYDROCHLORIDE 1 MILLIGRAM(S): 2 INJECTION INTRAMUSCULAR; INTRAVENOUS; SUBCUTANEOUS at 11:30

## 2019-02-27 RX ADMIN — LORATADINE 10 MILLIGRAM(S): 10 TABLET ORAL at 14:24

## 2019-02-27 RX ADMIN — Medication 125 MILLIGRAM(S): at 23:01

## 2019-02-27 RX ADMIN — MAGNESIUM OXIDE 400 MG ORAL TABLET 400 MILLIGRAM(S): 241.3 TABLET ORAL at 14:24

## 2019-02-27 RX ADMIN — AMLODIPINE BESYLATE 5 MILLIGRAM(S): 2.5 TABLET ORAL at 05:33

## 2019-02-27 RX ADMIN — Medication 100 MILLIGRAM(S): at 21:17

## 2019-02-27 RX ADMIN — MYCOPHENOLATE MOFETIL 1000 MILLIGRAM(S): 250 CAPSULE ORAL at 15:19

## 2019-02-27 RX ADMIN — HYDROMORPHONE HYDROCHLORIDE 1 MILLIGRAM(S): 2 INJECTION INTRAMUSCULAR; INTRAVENOUS; SUBCUTANEOUS at 14:26

## 2019-02-27 RX ADMIN — Medication 480 MICROGRAM(S): at 17:37

## 2019-02-27 RX ADMIN — CEFEPIME 100 MILLIGRAM(S): 1 INJECTION, POWDER, FOR SOLUTION INTRAMUSCULAR; INTRAVENOUS at 21:17

## 2019-02-27 RX ADMIN — POTASSIUM PHOSPHATE, MONOBASIC POTASSIUM PHOSPHATE, DIBASIC 62.5 MILLIMOLE(S): 236; 224 INJECTION, SOLUTION INTRAVENOUS at 09:07

## 2019-02-27 RX ADMIN — NYSTATIN CREAM 1 APPLICATION(S): 100000 CREAM TOPICAL at 05:34

## 2019-02-27 RX ADMIN — Medication 125 MILLIGRAM(S): at 14:22

## 2019-02-27 RX ADMIN — HYDROMORPHONE HYDROCHLORIDE 1 MILLIGRAM(S): 2 INJECTION INTRAMUSCULAR; INTRAVENOUS; SUBCUTANEOUS at 14:10

## 2019-02-27 RX ADMIN — TACROLIMUS 2 MILLIGRAM(S): 5 CAPSULE ORAL at 17:38

## 2019-02-27 RX ADMIN — Medication 1 LOZENGE: at 09:07

## 2019-02-27 RX ADMIN — HYDROMORPHONE HYDROCHLORIDE 1 MILLIGRAM(S): 2 INJECTION INTRAMUSCULAR; INTRAVENOUS; SUBCUTANEOUS at 04:45

## 2019-02-27 RX ADMIN — Medication 1 MILLIGRAM(S): at 14:24

## 2019-02-27 RX ADMIN — HYDROMORPHONE HYDROCHLORIDE 1 MILLIGRAM(S): 2 INJECTION INTRAMUSCULAR; INTRAVENOUS; SUBCUTANEOUS at 23:15

## 2019-02-27 RX ADMIN — Medication 5 MILLILITER(S): at 14:20

## 2019-02-27 NOTE — PROGRESS NOTE ADULT - SUBJECTIVE AND OBJECTIVE BOX
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    S: Patient seen and examined with HPC Transplant Team:     Denies mouth / tongue / throat pain, dyspnea, cough, nausea, vomiting, diarrhea, abdominal pain     O: Vitals:   Vital Signs Last 24 Hrs  T(C): 36.9 (2019 05:19), Max: 37.4 (2019 11:05)  T(F): 98.4 (2019 05:19), Max: 99.3 (2019 11:05)  HR: 87 (2019 05:19) (86 - 92)  BP: 133/85 (2019 05:19) (128/83 - 159/94)  BP(mean): --  RR: 18 (2019 05:19) (18 - 18)  SpO2: 96% (2019 05:19) (95% - 99%)    Admit weight: 111.4kg   Daily Weight in k.9 (2019 09:36)  Today's weight:     Intake / Output:    @ 07:01  -   @ 07:00  --------------------------------------------------------  IN: 1210.9 mL / OUT: 2175 mL / NET: -964.1 mL      PE:   Oropharynx: no erythema or ulcers  Oral Mucositis: n/a                                                       stGstrstastdstest:st st1st CVS: S1S2, RRR  Lungs: CTA throughout bilaterally   Abdomen: soft, NT/ND, normoactive BS x 4Q  Extremities: no edema  Gastric Mucositis: n/a                                                 stGstrstastdstest:st st1st Intestinal Mucositis: n/a                                             stGstrstastdstest:st st1st Skin: no rash  TLC: C/D/I  Neuro: A&O x 3   Pain: chronic back pain     Karnofsky / Lansky Scale: 60%  GVHD: no evidence of acute GVHD   Skin: 0  Liver: 0  Gut: 0  Overall stGstrstastdstest:st st1st Labs:   CBC Full  -  ( 2019 06:49 )  WBC Count : 1.3 K/uL  Hemoglobin : 8.5 g/dL  Hematocrit : 24.9 %  Platelet Count - Automated : 14 K/uL  Mean Cell Volume : 102.0 fl  Mean Cell Hemoglobin : 34.6 pg  Mean Cell Hemoglobin Concentration : 34.1 gm/dL  Auto Neutrophil # : 0.8 K/uL  Auto Lymphocyte # : 0.1 K/uL  Auto Monocyte # : 0.4 K/uL  Auto Eosinophil # : 0.0 K/uL  Auto Basophil # : 0.0 K/uL  Auto Neutrophil % : 51.0 %  Auto Lymphocyte % : 4.0 %  Auto Monocyte % : 28.0 %  Auto Eosinophil % : 0.0 %  Auto Basophil % : 0.0 %                          8.5    1.3   )-----------( 14       ( 2019 06:49 )             24.9         144  |  107  |  12  ----------------------------<  89  3.7   |  24  |  0.80    Ca    10.5      2019 06:48  Phos  2.3       Mg     1.8         TPro  5.7<L>  /  Alb  3.4  /  TBili  0.2  /  DBili  <0.1  /  AST  14  /  ALT  17  /  AlkPhos  150<H>        LIVER FUNCTIONS - ( 2019 06:48 )  Alb: 3.4 g/dL / Pro: 5.7 g/dL / ALK PHOS: 150 U/L / ALT: 17 U/L / AST: 14 U/L / GGT: x           Lactate Dehydrogenase, Serum: 188 U/L ( @ 06:48)        @ 08:32  Tacrolimus 8.6                Cyclosporine --    Cultures:   Culture Results: urine  <10,000 CFU/ml Normal Urogenital everton present (19 @ 18:41)    Culture Results: blood  No growth at 5 days. (19 @ 18:30)    Culture Results: blood  No growth at 5 days. (19 @ 18:30)    Radiology:   EXAM:  XR CHEST PORTABLE URGENT 1V                        PROCEDURE DATE:  2019    IMPRESSION:  1.  The lungs are clear.    EXAM:  CT BRAIN                        PROCEDURE DATE:  2019    IMPRESSION:  No acute intracranial abnormality is noted. No subdural collections are   visualized. If the patient has new and persistent symptoms, consider   follow-up brain MRI with and without contrast, if there are no MRI or   contrast contraindications.  New moderate mucosal thickening involves the paranasal sinuses. Correlate   for possible sinusitis    Meds:   Antimicrobials:   acyclovir   Oral Tab/Cap 400 milliGRAM(s) Oral every 8 hours  cefepime   IVPB      cefepime   IVPB 2000 milliGRAM(s) IV Intermittent every 8 hours  clotrimazole Lozenge 1 Lozenge Oral five times a day  fluconAZOLE   Tablet 400 milliGRAM(s) Oral daily  vancomycin    Solution 125 milliGRAM(s) Oral every 6 hours      Heme / Onc:   heparin  Infusion 464 Unit(s)/Hr IV Continuous <Continuous>      GI:  docusate sodium 100 milliGRAM(s) Oral three times a day PRN  pantoprazole    Tablet 40 milliGRAM(s) Oral before breakfast  polyethylene glycol 3350 17 Gram(s) Oral daily  senna 1 Tablet(s) Oral at bedtime PRN  sodium bicarbonate Mouth Rinse 10 milliLiter(s) Swish and Spit five times a day  ursodiol Capsule 300 milliGRAM(s) Oral two times a day with meals      Cardiovascular:   amLODIPine   Tablet 5 milliGRAM(s) Oral daily  losartan 100 milliGRAM(s) Oral daily      Immunologic:   filgrastim-sndz Injectable 480 MICROGram(s) SubCutaneous daily  immune   globulin 10% (GAMMAGARD) IVPB 20 Gram(s) IV Intermittent <User Schedule>  mycophenolate mofetil 1000 milliGRAM(s) Oral <User Schedule>  tacrolimus 2 milliGRAM(s) Oral every 12 hours      Other medications:   acetaminophen   Tablet .. 650 milliGRAM(s) Oral every 6 hours  acetaminophen   Tablet .. 650 milliGRAM(s) Oral <User Schedule>  benzonatate 100 milliGRAM(s) Oral every 8 hours  Biotene Dry Mouth Oral Rinse 5 milliLiter(s) Swish and Spit five times a day  diphenhydrAMINE   Injectable 25 milliGRAM(s) IV Push every 3 weeks  folic acid 1 milliGRAM(s) Oral daily  levETIRAcetam 500 milliGRAM(s) Oral two times a day  lidocaine/prilocaine Cream 1 Application(s) Topical daily  loratadine 10 milliGRAM(s) Oral daily  magnesium oxide 400 milliGRAM(s) Oral three times a day with meals  multivitamin 1 Tablet(s) Oral daily  nystatin Powder 1 Application(s) Topical every 8 hours  ondansetron Injectable 8 milliGRAM(s) IV Push every 8 hours  potassium phosphate IVPB 15 milliMole(s) IV Intermittent once  sodium chloride 0.9%. 1000 milliLiter(s) IV Continuous <Continuous>      PRN:   acetaminophen   Tablet .. 650 milliGRAM(s) Oral every 6 hours PRN  acetaminophen   Tablet .. 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  Baseline CT Head non-con, no acute intracranial abnormality, possible sinusitis   Cough x 3 weeks, CXR clear, continue Tessalon 100 mg PRN, RVP (-)  Chronic back pain- Dilaudid 0.5 mg IVP q 4 hours PRN  Mild transaminitis - resolved   + C. diff - started vancomycin 125mg po q hours   Platelet refractory - platelets dispensed as 1/2 units to be given over 3 hours q 12 hours     1. Infectious Disease:   acyclovir   Oral Tab/Cap 400 milliGRAM(s) Oral every 8 hours  cefepime   IVPB      cefepime   IVPB 2000 milliGRAM(s) IV Intermittent every 8 hours  clotrimazole Lozenge 1 Lozenge Oral five times a day  fluconAZOLE   Tablet 400 milliGRAM(s) Oral daily  vancomycin    Solution 125 milliGRAM(s) Oral every 6 hours    2. VOD Prophylaxis: Actigall, Glutamine, Heparin (dosed at 100 units / kg / day)     3. GI Prophylaxis:    pantoprazole    Tablet 40 milliGRAM(s) Oral before breakfast    4. Mouthcare - NS / NaHCO3 rinses, Mycelex, Biotene; Skin care     5. GVHD prophylaxis   mycophenolate mofetil 1000 milliGRAM(s) Oral <User Schedule>  tacrolimus 2 milliGRAM(s) Oral every 12 hours    6. Transfuse & replete electrolytes prn   potassium phosphate IVPB 15 milliMole(s) IV Intermittent once  magnesium oxide 400 milliGRAM(s) Oral three times a day with meals    7. IV hydration, daily weights, strict I&O, prn diuresis     8. PO intake as tolerated, nutrition follow up as needed, MVI, folic acid     9. Antiemetics, anti-diarrhea medications:   metoclopramide Injectable 10 milliGRAM(s) IV Push every 6 hours PRN  ondansetron Injectable 8 milliGRAM(s) IV Push every 8 hours    10. OOB as tolerated, physical therapy consult if needed     11. Monitor coags / fibrinogen 2x week, vitamin K as needed     12. Monitor closely for clinical changes, monitor for fevers     13. Emotional support provided, plan of care discussed and questions addressed     14. Patient education done regarding plan of care, restrictions and discharge planning     15. Continue regular social work input     I have written the above note for Dr. Logan who performed service with me in the room.   Janett Barrow NP-C ([phone])    I have seen and examined patient with NP, I agree with above note as scribed. 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    S: Patient seen and examined with HPC Transplant Team:     Denies mouth / tongue / throat pain, dyspnea, cough, nausea, vomiting, diarrhea, abdominal pain     O: Vitals:   Vital Signs Last 24 Hrs  T(C): 36.9 (2019 05:19), Max: 37.4 (2019 11:05)  T(F): 98.4 (2019 05:19), Max: 99.3 (2019 11:05)  HR: 87 (2019 05:19) (86 - 92)  BP: 133/85 (2019 05:19) (128/83 - 159/94)  BP(mean): --  RR: 18 (2019 05:19) (18 - 18)  SpO2: 96% (2019 05:19) (95% - 99%)    Admit weight: 111.4kg   Daily Weight in k.9 (2019 09:36)  Today's weight:     Intake / Output:    @ 07:01  -   @ 07:00  --------------------------------------------------------  IN: 1210.9 mL / OUT: 2175 mL / NET: -964.1 mL      PE:   Oropharynx: no erythema or ulcers  Oral Mucositis: n/a                                                       stGstrstastdstest:st st1st CVS: S1S2, RRR  Lungs: CTA throughout bilaterally   Abdomen: soft, NT/ND, normoactive BS x 4Q  Extremities: no edema  Gastric Mucositis: n/a                                                 stGstrstastdstest:st st1st Intestinal Mucositis: n/a                                             stGstrstastdstest:st st1st Skin: no rash  TLC: C/D/I  Neuro: A&O x 3   Pain: chronic back pain     Karnofsky / Lansky Scale: 50%  GVHD: early engraftment - unable to assess for aGVHD at this time. Will assess post engraftment.   Skin: 0  Liver: 0  Gut: 0  Overall stGstrstastdstest:st st1st Labs:   CBC Full  -  ( 2019 06:49 )  WBC Count : 1.3 K/uL  Hemoglobin : 8.5 g/dL  Hematocrit : 24.9 %  Platelet Count - Automated : 14 K/uL  Mean Cell Volume : 102.0 fl  Mean Cell Hemoglobin : 34.6 pg  Mean Cell Hemoglobin Concentration : 34.1 gm/dL  Auto Neutrophil # : 0.8 K/uL  Auto Lymphocyte # : 0.1 K/uL  Auto Monocyte # : 0.4 K/uL  Auto Eosinophil # : 0.0 K/uL  Auto Basophil # : 0.0 K/uL  Auto Neutrophil % : 51.0 %  Auto Lymphocyte % : 4.0 %  Auto Monocyte % : 28.0 %  Auto Eosinophil % : 0.0 %  Auto Basophil % : 0.0 %                          8.5    1.3   )-----------( 14       ( 2019 06:49 )             24.9         144  |  107  |  12  ----------------------------<  89  3.7   |  24  |  0.80    Ca    10.5      2019 06:48  Phos  2.3       Mg     1.8         TPro  5.7<L>  /  Alb  3.4  /  TBili  0.2  /  DBili  <0.1  /  AST  14  /  ALT  17  /  AlkPhos  150<H>        LIVER FUNCTIONS - ( 2019 06:48 )  Alb: 3.4 g/dL / Pro: 5.7 g/dL / ALK PHOS: 150 U/L / ALT: 17 U/L / AST: 14 U/L / GGT: x           Lactate Dehydrogenase, Serum: 188 U/L ( @ 06:48)        @ 08:32  Tacrolimus 8.6                Cyclosporine --    Cultures:   Culture Results: urine  <10,000 CFU/ml Normal Urogenital everton present (19 @ 18:41)    Culture Results: blood  No growth at 5 days. (19 @ 18:30)    Culture Results: blood  No growth at 5 days. (19 @ 18:30)    Radiology:   EXAM:  XR CHEST PORTABLE URGENT 1V                        PROCEDURE DATE:  2019    IMPRESSION:  1.  The lungs are clear.    EXAM:  CT BRAIN                        PROCEDURE DATE:  2019    IMPRESSION:  No acute intracranial abnormality is noted. No subdural collections are   visualized. If the patient has new and persistent symptoms, consider   follow-up brain MRI with and without contrast, if there are no MRI or   contrast contraindications.  New moderate mucosal thickening involves the paranasal sinuses. Correlate   for possible sinusitis    Meds:   Antimicrobials:   acyclovir   Oral Tab/Cap 400 milliGRAM(s) Oral every 8 hours  cefepime   IVPB      cefepime   IVPB 2000 milliGRAM(s) IV Intermittent every 8 hours  clotrimazole Lozenge 1 Lozenge Oral five times a day  fluconAZOLE   Tablet 400 milliGRAM(s) Oral daily  vancomycin    Solution 125 milliGRAM(s) Oral every 6 hours      Heme / Onc:   heparin  Infusion 464 Unit(s)/Hr IV Continuous <Continuous>      GI:  docusate sodium 100 milliGRAM(s) Oral three times a day PRN  pantoprazole    Tablet 40 milliGRAM(s) Oral before breakfast  polyethylene glycol 3350 17 Gram(s) Oral daily  senna 1 Tablet(s) Oral at bedtime PRN  sodium bicarbonate Mouth Rinse 10 milliLiter(s) Swish and Spit five times a day  ursodiol Capsule 300 milliGRAM(s) Oral two times a day with meals      Cardiovascular:   amLODIPine   Tablet 5 milliGRAM(s) Oral daily  losartan 100 milliGRAM(s) Oral daily      Immunologic:   filgrastim-sndz Injectable 480 MICROGram(s) SubCutaneous daily  immune   globulin 10% (GAMMAGARD) IVPB 20 Gram(s) IV Intermittent <User Schedule>  mycophenolate mofetil 1000 milliGRAM(s) Oral <User Schedule>  tacrolimus 2 milliGRAM(s) Oral every 12 hours      Other medications:   acetaminophen   Tablet .. 650 milliGRAM(s) Oral every 6 hours  acetaminophen   Tablet .. 650 milliGRAM(s) Oral <User Schedule>  benzonatate 100 milliGRAM(s) Oral every 8 hours  Biotene Dry Mouth Oral Rinse 5 milliLiter(s) Swish and Spit five times a day  diphenhydrAMINE   Injectable 25 milliGRAM(s) IV Push every 3 weeks  folic acid 1 milliGRAM(s) Oral daily  levETIRAcetam 500 milliGRAM(s) Oral two times a day  lidocaine/prilocaine Cream 1 Application(s) Topical daily  loratadine 10 milliGRAM(s) Oral daily  magnesium oxide 400 milliGRAM(s) Oral three times a day with meals  multivitamin 1 Tablet(s) Oral daily  nystatin Powder 1 Application(s) Topical every 8 hours  ondansetron Injectable 8 milliGRAM(s) IV Push every 8 hours  potassium phosphate IVPB 15 milliMole(s) IV Intermittent once  sodium chloride 0.9%. 1000 milliLiter(s) IV Continuous <Continuous>      PRN:   acetaminophen   Tablet .. 650 milliGRAM(s) Oral every 6 hours PRN  acetaminophen   Tablet .. 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 diff - started vancomycin 125mg po q hours   Platelet refractory - platelets dispensed as 1/2 units to be given over 3 hours q 12 hours     1. Infectious Disease:   acyclovir   Oral Tab/Cap 400 milliGRAM(s) Oral every 8 hours  cefepime   IVPB      cefepime   IVPB 2000 milliGRAM(s) IV Intermittent every 8 hours  clotrimazole Lozenge 1 Lozenge Oral five times a day  fluconAZOLE   Tablet 400 milliGRAM(s) Oral daily  vancomycin    Solution 125 milliGRAM(s) Oral every 6 hours    2. VOD Prophylaxis: Actigall, Glutamine, Heparin (dosed at 100 units / kg / day)     3. GI Prophylaxis:    pantoprazole    Tablet 40 milliGRAM(s) Oral before breakfast    4. Mouthcare - NS / NaHCO3 rinses, Mycelex, Biotene; Skin care     5. GVHD prophylaxis   mycophenolate mofetil 1000 milliGRAM(s) Oral <User Schedule>  tacrolimus 2 milliGRAM(s) Oral every 12 hours    6. Transfuse & replete electrolytes prn   potassium phosphate IVPB 15 milliMole(s) IV Intermittent once  magnesium oxide 400 milliGRAM(s) Oral three times a day with meals    7. IV hydration, daily weights, strict I&O, prn diuresis     8. PO intake as tolerated, nutrition follow up as needed, MVI, folic acid     9. Antiemetics, anti-diarrhea medications:   metoclopramide Injectable 10 milliGRAM(s) IV Push every 6 hours PRN  ondansetron Injectable 8 milliGRAM(s) IV Push every 8 hours    10. OOB as tolerated, physical therapy consult if needed     11. Monitor coags / fibrinogen 2x week, vitamin K as needed     12. Monitor closely for clinical changes, monitor for fevers     13. Emotional support provided, plan of care discussed and questions addressed     14. Patient education done regarding plan of care, restrictions and discharge planning     15. Continue regular social work input     I have written the above note for Dr. Logan who performed service with me in the room.   Janett Barrow NP-C ([phone])    I have seen and examined patient with NP, I agree with above note as scribed. 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    S: Patient seen and examined with HPC Transplant Team:   + bone pain   + mild, intermittent nausea   Denies mouth / tongue / throat pain, dyspnea, cough, vomiting, diarrhea, abdominal pain     O: Vitals:   Vital Signs Last 24 Hrs  T(C): 36.9 (2019 05:19), Max: 37.4 (2019 11:05)  T(F): 98.4 (2019 05:19), Max: 99.3 (2019 11:05)  HR: 87 (2019 05:19) (86 - 92)  BP: 133/85 (2019 05:19) (128/83 - 159/94)  BP(mean): --  RR: 18 (2019 05:19) (18 - 18)  SpO2: 96% (2019 05:19) (95% - 99%)    Admit weight: 111.4kg   Daily Weight in k.9 (2019 09:36)  Today's weight: 111.7kg     Intake / Output:    @ 07:01  -   @ 07:00  --------------------------------------------------------  IN: 1210.9 mL / OUT: 2175 mL / NET: -964.1 mL      PE:   Oropharynx: no erythema or ulcers; resolving blood blister on the lower lip   Oral Mucositis: n/a                                                       stGstrstastdstest:st st1st CVS: S1S2, RRR  Lungs: CTA throughout bilaterally   Abdomen: soft, NT/ND, normoactive BS x 4Q  Extremities: no edema  Gastric Mucositis: n/a                                                 stGstrstastdstest:st st1st Intestinal Mucositis: n/a                                             stGstrstastdstest:st st1st Skin: no rash  TLC: C/D/I  Neuro: A&O x 3   Pain: chronic back pain     Karnofsky / Lansky Scale: 50%  GVHD: early engraftment - unable to assess for aGVHD at this time. Will assess post engraftment.   Skin: 0  Liver: 0  Gut: 0  Overall stGstrstastdstest:st st1st Labs:   CBC Full  -  ( 2019 06:49 )  WBC Count : 1.3 K/uL  Hemoglobin : 8.5 g/dL  Hematocrit : 24.9 %  Platelet Count - Automated : 14 K/uL  Mean Cell Volume : 102.0 fl  Mean Cell Hemoglobin : 34.6 pg  Mean Cell Hemoglobin Concentration : 34.1 gm/dL  Auto Neutrophil # : 0.8 K/uL  Auto Lymphocyte # : 0.1 K/uL  Auto Monocyte # : 0.4 K/uL  Auto Eosinophil # : 0.0 K/uL  Auto Basophil # : 0.0 K/uL  Auto Neutrophil % : 51.0 %  Auto Lymphocyte % : 4.0 %  Auto Monocyte % : 28.0 %  Auto Eosinophil % : 0.0 %  Auto Basophil % : 0.0 %                          8.5    1.3   )-----------( 14       ( 2019 06:49 )             24.9         144  |  107  |  12  ----------------------------<  89  3.7   |  24  |  0.80    Ca    10.5      2019 06:48  Phos  2.3       Mg     1.8         TPro  5.7<L>  /  Alb  3.4  /  TBili  0.2  /  DBili  <0.1  /  AST  14  /  ALT  17  /  AlkPhos  150<H>        LIVER FUNCTIONS - ( 2019 06:48 )  Alb: 3.4 g/dL / Pro: 5.7 g/dL / ALK PHOS: 150 U/L / ALT: 17 U/L / AST: 14 U/L / GGT: x           Lactate Dehydrogenase, Serum: 188 U/L ( @ 06:48)        @ 08:32  Tacrolimus 8.6                Cyclosporine --    Cultures:   Culture Results: urine  <10,000 CFU/ml Normal Urogenital everton present (19 @ 18:41)    Culture Results: blood  No growth at 5 days. (19 @ 18:30)    Culture Results: blood  No growth at 5 days. (19 @ 18:30)    Radiology:   EXAM:  XR CHEST PORTABLE URGENT 1V                        PROCEDURE DATE:  2019    IMPRESSION:  1.  The lungs are clear.    EXAM:  CT BRAIN                        PROCEDURE DATE:  2019    IMPRESSION:  No acute intracranial abnormality is noted. No subdural collections are   visualized. If the patient has new and persistent symptoms, consider   follow-up brain MRI with and without contrast, if there are no MRI or   contrast contraindications.  New moderate mucosal thickening involves the paranasal sinuses. Correlate   for possible sinusitis    Meds:   Antimicrobials:   acyclovir   Oral Tab/Cap 400 milliGRAM(s) Oral every 8 hours  cefepime   IVPB      cefepime   IVPB 2000 milliGRAM(s) IV Intermittent every 8 hours  clotrimazole Lozenge 1 Lozenge Oral five times a day  fluconAZOLE   Tablet 400 milliGRAM(s) Oral daily  vancomycin    Solution 125 milliGRAM(s) Oral every 6 hours      Heme / Onc:   heparin  Infusion 464 Unit(s)/Hr IV Continuous <Continuous>      GI:  docusate sodium 100 milliGRAM(s) Oral three times a day PRN  pantoprazole    Tablet 40 milliGRAM(s) Oral before breakfast  polyethylene glycol 3350 17 Gram(s) Oral daily  senna 1 Tablet(s) Oral at bedtime PRN  sodium bicarbonate Mouth Rinse 10 milliLiter(s) Swish and Spit five times a day  ursodiol Capsule 300 milliGRAM(s) Oral two times a day with meals      Cardiovascular:   amLODIPine   Tablet 5 milliGRAM(s) Oral daily  losartan 100 milliGRAM(s) Oral daily      Immunologic:   filgrastim-sndz Injectable 480 MICROGram(s) SubCutaneous daily  immune   globulin 10% (GAMMAGARD) IVPB 20 Gram(s) IV Intermittent <User Schedule>  mycophenolate mofetil 1000 milliGRAM(s) Oral <User Schedule>  tacrolimus 2 milliGRAM(s) Oral every 12 hours      Other medications:   acetaminophen   Tablet .. 650 milliGRAM(s) Oral every 6 hours  acetaminophen   Tablet .. 650 milliGRAM(s) Oral <User Schedule>  benzonatate 100 milliGRAM(s) Oral every 8 hours  Biotene Dry Mouth Oral Rinse 5 milliLiter(s) Swish and Spit five times a day  diphenhydrAMINE   Injectable 25 milliGRAM(s) IV Push every 3 weeks  folic acid 1 milliGRAM(s) Oral daily  levETIRAcetam 500 milliGRAM(s) Oral two times a day  lidocaine/prilocaine Cream 1 Application(s) Topical daily  loratadine 10 milliGRAM(s) Oral daily  magnesium oxide 400 milliGRAM(s) Oral three times a day with meals  multivitamin 1 Tablet(s) Oral daily  nystatin Powder 1 Application(s) Topical every 8 hours  ondansetron Injectable 8 milliGRAM(s) IV Push every 8 hours  potassium phosphate IVPB 15 milliMole(s) IV Intermittent once  sodium chloride 0.9%. 1000 milliLiter(s) IV Continuous <Continuous>      PRN:   acetaminophen   Tablet .. 650 milliGRAM(s) Oral every 6 hours PRN  acetaminophen   Tablet .. 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 diff - started vancomycin 125mg po q hours   Platelet refractory - platelets dispensed as 1/2 units to be given over 3 hours q 12 hours     1. Infectious Disease:   acyclovir   Oral Tab/Cap 400 milliGRAM(s) Oral every 8 hours  cefepime   IVPB      cefepime   IVPB 2000 milliGRAM(s) IV Intermittent every 8 hours  clotrimazole Lozenge 1 Lozenge Oral five times a day  fluconAZOLE   Tablet 400 milliGRAM(s) Oral daily  vancomycin    Solution 125 milliGRAM(s) Oral every 6 hours    2. VOD Prophylaxis: Actigall, Glutamine, Heparin (dosed at 100 units / kg / day)     3. GI Prophylaxis:    pantoprazole    Tablet 40 milliGRAM(s) Oral before breakfast    4. Mouthcare - NS / NaHCO3 rinses, Mycelex, Biotene; Skin care     5. GVHD prophylaxis   mycophenolate mofetil 1000 milliGRAM(s) Oral <User Schedule>  tacrolimus 2 milliGRAM(s) Oral every 12 hours    6. Transfuse & replete electrolytes prn   potassium phosphate IVPB 15 milliMole(s) IV Intermittent once  magnesium oxide 400 milliGRAM(s) Oral three times a day with meals    7. IV hydration, daily weights, strict I&O, prn diuresis     8. PO intake as tolerated, nutrition follow up as needed, MVI, folic acid     9. Antiemetics, anti-diarrhea medications:   metoclopramide Injectable 10 milliGRAM(s) IV Push every 6 hours PRN  ondansetron Injectable 8 milliGRAM(s) IV Push every 8 hours    10. OOB as tolerated, physical therapy consult if needed     11. Monitor coags / fibrinogen 2x week, vitamin K as needed     12. Monitor closely for clinical changes, monitor for fevers     13. Emotional support provided, plan of care discussed and questions addressed     14. Patient education done regarding plan of care, restrictions and discharge planning     15. Continue regular social work input     I have written the above note for Dr. Logan who performed service with me in the room.   Janett Barrow NP-C ([phone])    I have seen and examined patient with NP, I agree with above note as scribed.

## 2019-02-27 NOTE — PROGRESS NOTE ADULT - ATTENDING COMMENTS
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  Bone marrow biopsy on 1/23/19 showed remission with FISH and PCR for BCR-ABL translocation negative on the BM specimen but peripheral blood testing on 1/31 showed .004% BCR-ABL transcript with negative FISH suggesting MRD positivity.  Patient was on Ponatinib, DC 1/31/19. LFT's improved off drug.  Now in CR2    Patient was admitted for haploidentical stem cell transplantation with fludarabine/cytoxan/TBI conditioning. She is s/p HPC transplant, day + 14  Completed high-dose Cytoxan on days +3, +4(GVHD prophylaxis); then begin Tacrolimus, Cellcept on day +5.  Begin Zarxio on day +5    Neutropenic fevers resolved, s/p haplo storm - on Cefepime, culture from 2/14, 2/16 negative.  CXR negative.  On Acyclovir, Fluconazole prophylaxis.  C. Diff diarrhea- continue oral Vancomycin, diarrhea improving  Monitor I/O's/weights- lasix as needed  VOD prophylaxis as per protocol.  Mouth care, skin care  Antiemetics as needed    History of SDH, continue Keppra. CT scan baseline 2/7/19 negative for SDH.   History of HTN continue Losartan.  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  Bone marrow biopsy on 1/23/19 showed remission with FISH and PCR for BCR-ABL translocation negative on the BM specimen but peripheral blood testing on 1/31 showed .004% BCR-ABL transcript with negative FISH suggesting MRD positivity.  Patient was on Ponatinib, DC 1/31/19. LFT's improved off drug.  Now in CR2    Patient was admitted for haploidentical stem cell transplantation with fludarabine/cytoxan/TBI conditioning. She is s/p HPC transplant, day + 14  Completed high-dose Cytoxan on days +3, +4(GVHD prophylaxis); then begin Tacrolimus, Cellcept on day +5.  Begin Zarxio on day +5  Early engraftment- continue Zarxio. Monitor daily CBC with diff. Monitor for aGVHD.  Neutropenic fevers resolved, s/p haplo storm - on Cefepime, culture from 2/14, 2/16 negative.  CXR negative.  On Acyclovir, Fluconazole prophylaxis.  C. Diff diarrhea- continue oral Vancomycin, diarrhea improving  Monitor I/O's/weights- lasix as needed  VOD prophylaxis as per protocol.  Mouth care, skin care  Antiemetics as needed    History of SDH, continue Keppra. CT scan baseline 2/7/19 negative for SDH.   History of HTN continue Losartan.  OOB/ambulate

## 2019-02-28 LAB
ALBUMIN SERPL ELPH-MCNC: 3.4 G/DL — SIGNIFICANT CHANGE UP (ref 3.3–5)
ALP SERPL-CCNC: 169 U/L — HIGH (ref 40–120)
ALT FLD-CCNC: 16 U/L — SIGNIFICANT CHANGE UP (ref 10–45)
ANION GAP SERPL CALC-SCNC: 12 MMOL/L — SIGNIFICANT CHANGE UP (ref 5–17)
APTT BLD: 36.4 SEC — HIGH (ref 27.5–36.3)
AST SERPL-CCNC: 18 U/L — SIGNIFICANT CHANGE UP (ref 10–40)
BASOPHILS # BLD AUTO: 0.1 K/UL — SIGNIFICANT CHANGE UP (ref 0–0.2)
BILIRUB SERPL-MCNC: 0.2 MG/DL — SIGNIFICANT CHANGE UP (ref 0.2–1.2)
BLASTS # FLD: 2 % — HIGH (ref 0–0)
BUN SERPL-MCNC: 10 MG/DL — SIGNIFICANT CHANGE UP (ref 7–23)
CALCIUM SERPL-MCNC: 10.5 MG/DL — SIGNIFICANT CHANGE UP (ref 8.4–10.5)
CHLORIDE SERPL-SCNC: 107 MMOL/L — SIGNIFICANT CHANGE UP (ref 96–108)
CO2 SERPL-SCNC: 24 MMOL/L — SIGNIFICANT CHANGE UP (ref 22–31)
CREAT SERPL-MCNC: 0.79 MG/DL — SIGNIFICANT CHANGE UP (ref 0.5–1.3)
EOSINOPHIL # BLD AUTO: 0 K/UL — SIGNIFICANT CHANGE UP (ref 0–0.5)
FIBRINOGEN PPP-MCNC: 633 MG/DL — HIGH (ref 350–510)
GLUCOSE SERPL-MCNC: 96 MG/DL — SIGNIFICANT CHANGE UP (ref 70–99)
HCT VFR BLD CALC: 25.9 % — LOW (ref 34.5–45)
HGB BLD-MCNC: 8.8 G/DL — LOW (ref 11.5–15.5)
INR BLD: 0.98 RATIO — SIGNIFICANT CHANGE UP (ref 0.88–1.16)
LDH SERPL L TO P-CCNC: 287 U/L — HIGH (ref 50–242)
LYMPHOCYTES # BLD AUTO: 0.3 K/UL — LOW (ref 1–3.3)
LYMPHOCYTES # BLD AUTO: 1 % — LOW (ref 13–44)
MAGNESIUM SERPL-MCNC: 1.5 MG/DL — LOW (ref 1.6–2.6)
MCHC RBC-ENTMCNC: 34.2 GM/DL — SIGNIFICANT CHANGE UP (ref 32–36)
MCHC RBC-ENTMCNC: 34.8 PG — HIGH (ref 27–34)
MCV RBC AUTO: 102 FL — HIGH (ref 80–100)
METAMYELOCYTES # FLD: 1 % — HIGH (ref 0–0)
MONOCYTES # BLD AUTO: 1.3 K/UL — HIGH (ref 0–0.9)
MONOCYTES NFR BLD AUTO: 22 % — HIGH (ref 2–14)
MYELOCYTES NFR BLD: 1 % — HIGH (ref 0–0)
NEUTROPHILS # BLD AUTO: 5.1 K/UL — SIGNIFICANT CHANGE UP (ref 1.8–7.4)
NEUTROPHILS NFR BLD AUTO: 57 % — SIGNIFICANT CHANGE UP (ref 43–77)
NEUTS BAND # BLD: 16 % — HIGH (ref 0–8)
NRBC # BLD: 2 /100 — HIGH (ref 0–0)
PHOSPHATE SERPL-MCNC: 2.1 MG/DL — LOW (ref 2.5–4.5)
PLAT MORPH BLD: NORMAL — SIGNIFICANT CHANGE UP
PLATELET # BLD AUTO: 22 K/UL — LOW (ref 150–400)
POTASSIUM SERPL-MCNC: 3.6 MMOL/L — SIGNIFICANT CHANGE UP (ref 3.5–5.3)
POTASSIUM SERPL-SCNC: 3.6 MMOL/L — SIGNIFICANT CHANGE UP (ref 3.5–5.3)
PROT SERPL-MCNC: 6 G/DL — SIGNIFICANT CHANGE UP (ref 6–8.3)
PROTHROM AB SERPL-ACNC: 11.2 SEC — SIGNIFICANT CHANGE UP (ref 10–12.9)
RBC # BLD: 2.54 M/UL — LOW (ref 3.8–5.2)
RBC # FLD: 12.8 % — SIGNIFICANT CHANGE UP (ref 10.3–14.5)
RBC BLD AUTO: SIGNIFICANT CHANGE UP
SODIUM SERPL-SCNC: 143 MMOL/L — SIGNIFICANT CHANGE UP (ref 135–145)
TACROLIMUS SERPL-MCNC: 12.7 NG/ML — SIGNIFICANT CHANGE UP
WBC # BLD: 6.7 K/UL — SIGNIFICANT CHANGE UP (ref 3.8–10.5)
WBC # FLD AUTO: 6.7 K/UL — SIGNIFICANT CHANGE UP (ref 3.8–10.5)

## 2019-02-28 PROCEDURE — 99291 CRITICAL CARE FIRST HOUR: CPT

## 2019-02-28 RX ORDER — POTASSIUM PHOSPHATE, MONOBASIC POTASSIUM PHOSPHATE, DIBASIC 236; 224 MG/ML; MG/ML
15 INJECTION, SOLUTION INTRAVENOUS ONCE
Qty: 0 | Refills: 0 | Status: COMPLETED | OUTPATIENT
Start: 2019-02-28 | End: 2019-02-28

## 2019-02-28 RX ORDER — APREPITANT 80 MG/1
80 CAPSULE ORAL DAILY
Qty: 0 | Refills: 0 | Status: COMPLETED | OUTPATIENT
Start: 2019-02-28 | End: 2019-03-02

## 2019-02-28 RX ORDER — MAGNESIUM SULFATE 500 MG/ML
2 VIAL (ML) INJECTION ONCE
Qty: 0 | Refills: 0 | Status: COMPLETED | OUTPATIENT
Start: 2019-02-28 | End: 2019-02-28

## 2019-02-28 RX ORDER — HYDROMORPHONE HYDROCHLORIDE 2 MG/ML
1.5 INJECTION INTRAMUSCULAR; INTRAVENOUS; SUBCUTANEOUS
Qty: 0 | Refills: 0 | Status: DISCONTINUED | OUTPATIENT
Start: 2019-02-28 | End: 2019-03-04

## 2019-02-28 RX ORDER — HYDROMORPHONE HYDROCHLORIDE 2 MG/ML
1 INJECTION INTRAMUSCULAR; INTRAVENOUS; SUBCUTANEOUS ONCE
Qty: 0 | Refills: 0 | Status: DISCONTINUED | OUTPATIENT
Start: 2019-02-28 | End: 2019-02-28

## 2019-02-28 RX ADMIN — Medication 100 MILLIGRAM(S): at 13:07

## 2019-02-28 RX ADMIN — TACROLIMUS 2 MILLIGRAM(S): 5 CAPSULE ORAL at 05:14

## 2019-02-28 RX ADMIN — HYDROMORPHONE HYDROCHLORIDE 1.5 MILLIGRAM(S): 2 INJECTION INTRAMUSCULAR; INTRAVENOUS; SUBCUTANEOUS at 20:57

## 2019-02-28 RX ADMIN — Medication 5 MILLILITER(S): at 11:04

## 2019-02-28 RX ADMIN — HYDROMORPHONE HYDROCHLORIDE 1 MILLIGRAM(S): 2 INJECTION INTRAMUSCULAR; INTRAVENOUS; SUBCUTANEOUS at 02:00

## 2019-02-28 RX ADMIN — AMLODIPINE BESYLATE 5 MILLIGRAM(S): 2.5 TABLET ORAL at 05:14

## 2019-02-28 RX ADMIN — Medication 125 MILLIGRAM(S): at 13:07

## 2019-02-28 RX ADMIN — Medication 125 MILLIGRAM(S): at 23:10

## 2019-02-28 RX ADMIN — TACROLIMUS 2 MILLIGRAM(S): 5 CAPSULE ORAL at 18:00

## 2019-02-28 RX ADMIN — APREPITANT 80 MILLIGRAM(S): 80 CAPSULE ORAL at 13:11

## 2019-02-28 RX ADMIN — HYDROMORPHONE HYDROCHLORIDE 1 MILLIGRAM(S): 2 INJECTION INTRAMUSCULAR; INTRAVENOUS; SUBCUTANEOUS at 08:05

## 2019-02-28 RX ADMIN — Medication 5 MILLILITER(S): at 08:03

## 2019-02-28 RX ADMIN — Medication 10 MILLILITER(S): at 08:03

## 2019-02-28 RX ADMIN — PANTOPRAZOLE SODIUM 40 MILLIGRAM(S): 20 TABLET, DELAYED RELEASE ORAL at 05:14

## 2019-02-28 RX ADMIN — Medication 400 MILLIGRAM(S): at 05:14

## 2019-02-28 RX ADMIN — NYSTATIN CREAM 1 APPLICATION(S): 100000 CREAM TOPICAL at 06:39

## 2019-02-28 RX ADMIN — LOSARTAN POTASSIUM 100 MILLIGRAM(S): 100 TABLET, FILM COATED ORAL at 05:13

## 2019-02-28 RX ADMIN — Medication 400 MILLIGRAM(S): at 21:17

## 2019-02-28 RX ADMIN — Medication 1 LOZENGE: at 19:41

## 2019-02-28 RX ADMIN — MAGNESIUM OXIDE 400 MG ORAL TABLET 400 MILLIGRAM(S): 241.3 TABLET ORAL at 08:04

## 2019-02-28 RX ADMIN — Medication 10 MILLIGRAM(S): at 05:04

## 2019-02-28 RX ADMIN — ONDANSETRON 8 MILLIGRAM(S): 8 TABLET, FILM COATED ORAL at 13:08

## 2019-02-28 RX ADMIN — Medication 1 MILLIGRAM(S): at 13:08

## 2019-02-28 RX ADMIN — NYSTATIN CREAM 1 APPLICATION(S): 100000 CREAM TOPICAL at 15:42

## 2019-02-28 RX ADMIN — Medication 1 LOZENGE: at 15:36

## 2019-02-28 RX ADMIN — Medication 10 MILLILITER(S): at 19:41

## 2019-02-28 RX ADMIN — ONDANSETRON 8 MILLIGRAM(S): 8 TABLET, FILM COATED ORAL at 21:18

## 2019-02-28 RX ADMIN — MYCOPHENOLATE MOFETIL 1000 MILLIGRAM(S): 250 CAPSULE ORAL at 05:14

## 2019-02-28 RX ADMIN — Medication 5 MILLILITER(S): at 15:36

## 2019-02-28 RX ADMIN — HYDROMORPHONE HYDROCHLORIDE 1 MILLIGRAM(S): 2 INJECTION INTRAMUSCULAR; INTRAVENOUS; SUBCUTANEOUS at 05:05

## 2019-02-28 RX ADMIN — HYDROMORPHONE HYDROCHLORIDE 1 MILLIGRAM(S): 2 INJECTION INTRAMUSCULAR; INTRAVENOUS; SUBCUTANEOUS at 02:15

## 2019-02-28 RX ADMIN — MAGNESIUM OXIDE 400 MG ORAL TABLET 400 MILLIGRAM(S): 241.3 TABLET ORAL at 13:08

## 2019-02-28 RX ADMIN — Medication 400 MILLIGRAM(S): at 13:07

## 2019-02-28 RX ADMIN — Medication 1 LOZENGE: at 11:03

## 2019-02-28 RX ADMIN — MAGNESIUM OXIDE 400 MG ORAL TABLET 400 MILLIGRAM(S): 241.3 TABLET ORAL at 17:00

## 2019-02-28 RX ADMIN — HYDROMORPHONE HYDROCHLORIDE 1.5 MILLIGRAM(S): 2 INJECTION INTRAMUSCULAR; INTRAVENOUS; SUBCUTANEOUS at 20:27

## 2019-02-28 RX ADMIN — CEFEPIME 100 MILLIGRAM(S): 1 INJECTION, POWDER, FOR SOLUTION INTRAMUSCULAR; INTRAVENOUS at 05:13

## 2019-02-28 RX ADMIN — ONDANSETRON 8 MILLIGRAM(S): 8 TABLET, FILM COATED ORAL at 06:39

## 2019-02-28 RX ADMIN — Medication 1 TABLET(S): at 13:08

## 2019-02-28 RX ADMIN — POTASSIUM PHOSPHATE, MONOBASIC POTASSIUM PHOSPHATE, DIBASIC 62.5 MILLIMOLE(S): 236; 224 INJECTION, SOLUTION INTRAVENOUS at 11:05

## 2019-02-28 RX ADMIN — Medication 10 MILLILITER(S): at 11:04

## 2019-02-28 RX ADMIN — URSODIOL 300 MILLIGRAM(S): 250 TABLET, FILM COATED ORAL at 08:04

## 2019-02-28 RX ADMIN — Medication 1 LOZENGE: at 23:10

## 2019-02-28 RX ADMIN — Medication 50 GRAM(S): at 09:00

## 2019-02-28 RX ADMIN — Medication 10 MILLILITER(S): at 23:10

## 2019-02-28 RX ADMIN — HYDROMORPHONE HYDROCHLORIDE 1 MILLIGRAM(S): 2 INJECTION INTRAMUSCULAR; INTRAVENOUS; SUBCUTANEOUS at 11:20

## 2019-02-28 RX ADMIN — Medication 5 MILLILITER(S): at 19:41

## 2019-02-28 RX ADMIN — Medication 5 MILLILITER(S): at 23:10

## 2019-02-28 RX ADMIN — Medication 100 MILLIGRAM(S): at 21:17

## 2019-02-28 RX ADMIN — HYDROMORPHONE HYDROCHLORIDE 1 MILLIGRAM(S): 2 INJECTION INTRAMUSCULAR; INTRAVENOUS; SUBCUTANEOUS at 09:20

## 2019-02-28 RX ADMIN — LEVETIRACETAM 500 MILLIGRAM(S): 250 TABLET, FILM COATED ORAL at 18:00

## 2019-02-28 RX ADMIN — Medication 1 LOZENGE: at 08:03

## 2019-02-28 RX ADMIN — MYCOPHENOLATE MOFETIL 1000 MILLIGRAM(S): 250 CAPSULE ORAL at 13:08

## 2019-02-28 RX ADMIN — URSODIOL 300 MILLIGRAM(S): 250 TABLET, FILM COATED ORAL at 17:00

## 2019-02-28 RX ADMIN — LEVETIRACETAM 500 MILLIGRAM(S): 250 TABLET, FILM COATED ORAL at 05:13

## 2019-02-28 RX ADMIN — NYSTATIN CREAM 1 APPLICATION(S): 100000 CREAM TOPICAL at 21:17

## 2019-02-28 RX ADMIN — HYDROMORPHONE HYDROCHLORIDE 1 MILLIGRAM(S): 2 INJECTION INTRAMUSCULAR; INTRAVENOUS; SUBCUTANEOUS at 08:15

## 2019-02-28 RX ADMIN — HYDROMORPHONE HYDROCHLORIDE 1 MILLIGRAM(S): 2 INJECTION INTRAMUSCULAR; INTRAVENOUS; SUBCUTANEOUS at 05:20

## 2019-02-28 RX ADMIN — Medication 100 MILLIGRAM(S): at 05:13

## 2019-02-28 RX ADMIN — LORATADINE 10 MILLIGRAM(S): 10 TABLET ORAL at 13:07

## 2019-02-28 RX ADMIN — Medication 125 MILLIGRAM(S): at 18:00

## 2019-02-28 RX ADMIN — HYDROMORPHONE HYDROCHLORIDE 1 MILLIGRAM(S): 2 INJECTION INTRAMUSCULAR; INTRAVENOUS; SUBCUTANEOUS at 09:15

## 2019-02-28 RX ADMIN — MYCOPHENOLATE MOFETIL 1000 MILLIGRAM(S): 250 CAPSULE ORAL at 21:18

## 2019-02-28 RX ADMIN — HYDROMORPHONE HYDROCHLORIDE 1 MILLIGRAM(S): 2 INJECTION INTRAMUSCULAR; INTRAVENOUS; SUBCUTANEOUS at 11:04

## 2019-02-28 RX ADMIN — Medication 125 MILLIGRAM(S): at 05:13

## 2019-02-28 RX ADMIN — Medication 0.5 MILLIGRAM(S): at 13:04

## 2019-02-28 RX ADMIN — FLUCONAZOLE 400 MILLIGRAM(S): 150 TABLET ORAL at 13:07

## 2019-02-28 RX ADMIN — Medication 10 MILLILITER(S): at 15:36

## 2019-02-28 NOTE — PROGRESS NOTE ADULT - ATTENDING COMMENTS
58 year old female with PMH [place] chromosome positive ALL diagnosed in 2016 s/p R HyperCVAD X 4 cycles, IT MTX X 2, s/p autologous stem cell transplantation (12/16/16),  who presented in October 2018 with subdural hemorrhage and relapsed disease confirmed by peripheral blood flow cytometry treated with Inotuzumab X 2 cycles.  Bone marrow biopsy on 1/23/19 showed remission with FISH and PCR for BCR-ABL translocation negative on the BM specimen but peripheral blood testing on 1/31 showed .004% BCR-ABL transcript with negative FISH suggesting MRD positivity.  Patient was on Ponatinib, DC 1/31/19. LFT's improved off drug.  Now in CR2    Patient was admitted for haploidentical stem cell transplantation with fludarabine/cytoxan/TBI conditioning. She is s/p HPC transplant, day + 14  Completed high-dose Cytoxan on days +3, +4(GVHD prophylaxis); then begin Tacrolimus, Cellcept on day +5.  Begin Zarxio on day +5  Early engraftment- continue Zarxio. Monitor daily CBC with diff. Monitor for aGVHD.  Neutropenic fevers resolved, s/p haplo storm - on Cefepime, culture from 2/14, 2/16 negative.  CXR negative.  On Acyclovir, Fluconazole prophylaxis.  C. Diff diarrhea- continue oral Vancomycin, diarrhea improving  Monitor I/O's/weights- lasix as needed  VOD prophylaxis as per protocol.  Mouth care, skin care  Antiemetics as needed    History of SDH, continue Keppra. CT scan baseline 2/7/19 negative for SDH.   History of HTN continue Losartan.  OOB/ambulate 58 year old female with PMH [place] chromosome positive ALL diagnosed in 2016 s/p R HyperCVAD X 4 cycles, IT MTX X 2, s/p autologous stem cell transplantation (12/16/16),  who presented in October 2018 with subdural hemorrhage and relapsed disease confirmed by peripheral blood flow cytometry treated with Inotuzumab X 2 cycles.  Bone marrow biopsy on 1/23/19 showed remission with FISH and PCR for BCR-ABL translocation negative on the BM specimen but peripheral blood testing on 1/31 showed .004% BCR-ABL transcript with negative FISH suggesting MRD positivity.  Patient was on Ponatinib, DC 1/31/19. LFT's improved off drug.  Now in CR2    Patient was admitted for haploidentical stem cell transplantation with fludarabine/cytoxan/TBI conditioning. She is s/p HPC transplant, day + 15  Completed high-dose Cytoxan on days +3, +4(GVHD prophylaxis); then begin Tacrolimus, Cellcept on day +5.  Begin Zarxio on day +5  + WBC engraftment- discontinue Zarxio. Monitor daily CBC with diff. Monitor for aGVHD.  Neutropenic fevers resolved, s/p haplo storm - on Cefepime, culture from 2/14, 2/16 negative.  CXR negative.  On Acyclovir, Fluconazole prophylaxis.  Neutropenia now resolved with engraftment and growth factor support- D/C Cefepime, Zarxio  C. Diff diarrhea- continue oral Vancomycin, diarrhea improving  Monitor I/O's/weights- lasix as needed  VOD prophylaxis as per protocol.  Mouth care, skin care  Antiemetics as needed    History of SDH, continue Keppra. CT scan baseline 2/7/19 negative for SDH.   History of HTN continue Losartan.  OOB/ambulate

## 2019-02-28 NOTE — PROGRESS NOTE ADULT - SUBJECTIVE AND OBJECTIVE BOX
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    S: Patient seen and examined with HPC Transplant Team:   + bone pain   + mild, intermittent nausea   Denies mouth / tongue / throat pain, dyspnea, cough, vomiting, diarrhea, abdominal pain     O: Vitals:   Vital Signs Last 24 Hrs  T(C): 37 (2019 05:30), Max: 37.3 (2019 21:37)  T(F): 98.6 (2019 05:30), Max: 99.1 (2019 21:37)  HR: 93 (2019 05:30) (84 - 98)  BP: 119/80 (2019 05:30) (119/80 - 145/85)  RR: 20 (2019 05:30) (18 - 20)  SpO2: 100% (2019 05:30) (98% - 100%)    Admit weight: 111.4 kg  Daily     Daily Weight in k.7 (2019 10:00)    Intake / Output:    @ 07:01  -   @ 07:00  --------------------------------------------------------  IN: 1822 mL / OUT: 1650 mL / NET: 172 mL      PE:   Oropharynx: no erythema or ulcers; resolving blood blister on the lower lip   Oral Mucositis: n/a                                                       stGstrstastdstest:st st1st CVS: S1S2, RRR  Lungs: CTA throughout bilaterally   Abdomen: soft, NT/ND, normoactive BS x 4Q  Extremities: no edema  Gastric Mucositis: n/a                                                 stGstrstastdstest:st st1st Intestinal Mucositis: n/a                                             stGstrstastdstest:st st1st Skin: no rash  TLC: C/D/I  Neuro: A&O x 3   Pain: chronic back pain       Labs:                 Cultures:     Culture - Urine (19 @ 18:41)    Specimen Source: .Urine Clean Catch (Midstream)    Culture Results:   <10,000 CFU/ml Normal Urogenital everton present    Culture - Blood (19 @ 18:30)    Specimen Source: .Blood Blood-Peripheral    Culture Results:   No growth at 5 days.        Radiology:     from: Xray Chest 1 View- PORTABLE-Urgent (19 @ 09:12)     IMPRESSION:    1.  The lungs are clear.          Meds:   Antimicrobials:   acyclovir   Oral Tab/Cap 400 milliGRAM(s) Oral every 8 hours    cefepime   IVPB 2000 milliGRAM(s) IV Intermittent every 8 hours  clotrimazole Lozenge 1 Lozenge Oral five times a day  fluconAZOLE   Tablet 400 milliGRAM(s) Oral daily  vancomycin    Solution 125 milliGRAM(s) Oral every 6 hours      Heme / Onc:   heparin  Infusion 464 Unit(s)/Hr IV Continuous <Continuous>      GI:  docusate sodium 100 milliGRAM(s) Oral three times a day PRN  pantoprazole    Tablet 40 milliGRAM(s) Oral before breakfast  polyethylene glycol 3350 17 Gram(s) Oral daily  senna 1 Tablet(s) Oral at bedtime PRN  sodium bicarbonate Mouth Rinse 10 milliLiter(s) Swish and Spit five times a day  ursodiol Capsule 300 milliGRAM(s) Oral two times a day with meals      Cardiovascular:   amLODIPine   Tablet 5 milliGRAM(s) Oral daily  losartan 100 milliGRAM(s) Oral daily      Immunologic:   filgrastim-sndz Injectable 480 MICROGram(s) SubCutaneous daily  immune   globulin 10% (GAMMAGARD) IVPB 20 Gram(s) IV Intermittent <User Schedule>  mycophenolate mofetil 1000 milliGRAM(s) Oral <User Schedule>  tacrolimus 2 milliGRAM(s) Oral every 12 hours      Other medications:   acetaminophen   Tablet .. 650 milliGRAM(s) Oral every 6 hours  acetaminophen   Tablet .. 650 milliGRAM(s) Oral <User Schedule>  benzonatate 100 milliGRAM(s) Oral every 8 hours  Biotene Dry Mouth Oral Rinse 5 milliLiter(s) Swish and Spit five times a day  diphenhydrAMINE   Injectable 25 milliGRAM(s) IV Push every 3 weeks  folic acid 1 milliGRAM(s) Oral daily  levETIRAcetam 500 milliGRAM(s) Oral two times a day  lidocaine/prilocaine Cream 1 Application(s) Topical daily  loratadine 10 milliGRAM(s) Oral daily  magnesium oxide 400 milliGRAM(s) Oral three times a day with meals  multivitamin 1 Tablet(s) Oral daily  nystatin Powder 1 Application(s) Topical every 8 hours  ondansetron Injectable 8 milliGRAM(s) IV Push every 8 hours  sodium chloride 0.9%. 1000 milliLiter(s) IV Continuous <Continuous>      PRN:   acetaminophen   Tablet .. 650 milliGRAM(s) Oral every 6 hours PRN  acetaminophen   Tablet .. 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 diff - started vancomycin 125mg po q hours   Platelet refractory - platelets dispensed as 1/2 units to be given over 3 hours q 12 hours     1. Infectious Disease:   acyclovir   Oral Tab/Cap 400 milliGRAM(s) Oral every 8 hours  cefepime   IVPB 2000 milliGRAM(s) IV Intermittent every 8 hours  clotrimazole Lozenge 1 Lozenge Oral five times a day  fluconAZOLE   Tablet 400 milliGRAM(s) Oral daily  vancomycin    Solution 125 milliGRAM(s) Oral every 6 hours    2. VOD Prophylaxis: Actigall, Glutamine, Heparin (dosed at 100 units / kg / day)     3. GI Prophylaxis:    pantoprazole    Tablet 40 milliGRAM(s) Oral before breakfast    4. Mouthcare - NS / NaHCO3 rinses, Mycelex, Biotene; Skin care     5. GVHD prophylaxis   mycophenolate mofetil 1000 milliGRAM(s) Oral <User Schedule>  tacrolimus 2 milliGRAM(s) Oral every 12 hours    6. Transfuse & replete electrolytes prn   potassium phosphate IVPB 15 milliMole(s) IV Intermittent once  magnesium oxide 400 milliGRAM(s) Oral three times a day with meals    7. IV hydration, daily weights, strict I&O, prn diuresis     8. PO intake as tolerated, nutrition follow up as needed, MVI, folic acid     9. Antiemetics, anti-diarrhea medications:   metoclopramide Injectable 10 milliGRAM(s) IV Push every 6 hours PRN  ondansetron Injectable 8 milliGRAM(s) IV Push every 8 hours    10. OOB as tolerated, physical therapy consult if needed     11. Monitor coags / fibrinogen 2x week, vitamin K as needed     12. Monitor closely for clinical changes, monitor for fevers     13. Emotional support provided, plan of care discussed and questions addressed     14. Patient education done regarding plan of care, restrictions and discharge planning     15. Continue regular social work input     I have written the above note for Dr. Logan who performed service with me in the room.   Janett Barrow NP-C ([phone])    I have seen and examined patient with NP, I agree with above note as scribed. 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    S: Patient seen and examined with HPC Transplant Team:   + bone pain   + mild, intermittent nausea   + one episode vomitting    Denies mouth / tongue / throat pain, dyspnea, cough, abdominal pain     O: Vitals:   Vital Signs Last 24 Hrs  T(C): 37 (2019 05:30), Max: 37.3 (2019 21:37)  T(F): 98.6 (2019 05:30), Max: 99.1 (2019 21:37)  HR: 93 (2019 05:30) (84 - 98)  BP: 119/80 (2019 05:30) (119/80 - 145/85)  RR: 20 (2019 05:30) (18 - 20)  SpO2: 100% (2019 05:30) (98% - 100%)    Admit weight: 111.4 kg     Daily Weight in k.7 (2019 10:00)  Today's weight    Intake / Output:    @ 07:01  -  28 @ 07:00  --------------------------------------------------------  IN: 1822 mL / OUT: 1650 mL / NET: 172 mL      PE:   Oropharynx: no erythema or ulcers; resolving blood blister on the lower lip   Oral Mucositis: n/a                                                       stGstrstastdstest:st st1st CVS: S1S2, RRR  Lungs: CTA throughout bilaterally   Abdomen: soft, NT/ND, normoactive BS x 4Q  Extremities: no edema  Gastric Mucositis: n/a                                                 stGstrstastdstest:st st1st Intestinal Mucositis: n/a                                             stGstrstastdstest:st st1st Skin: no rash  TLC: C/D/I  Neuro: A&O x 3   Pain: chronic back pain/ bone pain 7/10      Labs:                         8.8    6.7   )-----------( 22       ( 2019 06:57 )             25.9     2019 06:57    143    |  107    |  10     ----------------------------<  96     3.6     |  24     |  0.79     Ca    10.5       2019 06:57  Phos  2.1       2019 06:57  Mg     1.5       2019 06:57    TPro  6.0    /  Alb  3.4    /  TBili  0.2    /  DBili  x      /  AST  18     /  ALT  16     /  AlkPhos  169    2019 06:57    PT/INR - ( 2019 06:57 )   PT: 11.2 sec;   INR: 0.98 ratio    PTT - ( 2019 06:57 )  PTT:36.4 sec      LIVER FUNCTIONS - ( 2019 06:57 )  Alb: 3.4 g/dL / Pro: 6.0 g/dL / ALK PHOS: 169 U/L / ALT: 16 U/L / AST: 18 U/L / GGT: x                 Cultures:     Culture - Urine (19 @ 18:41)    Specimen Source: .Urine Clean Catch (Midstream)    Culture Results:   <10,000 CFU/ml Normal Urogenital everton present    Culture - Blood (19 @ 18:30)    Specimen Source: .Blood Blood-Peripheral    Culture Results:   No growth at 5 days.      Radiology:     from: Xray Chest 1 View- PORTABLE-Urgent (19 @ 09:12)     IMPRESSION:    1.  The lungs are clear.        Meds:   Antimicrobials:   acyclovir   Oral Tab/Cap 400 milliGRAM(s) Oral every 8 hours    cefepime   IVPB 2000 milliGRAM(s) IV Intermittent every 8 hours  clotrimazole Lozenge 1 Lozenge Oral five times a day  fluconAZOLE   Tablet 400 milliGRAM(s) Oral daily  vancomycin    Solution 125 milliGRAM(s) Oral every 6 hours      Heme / Onc:   heparin  Infusion 464 Unit(s)/Hr IV Continuous <Continuous>      GI:  docusate sodium 100 milliGRAM(s) Oral three times a day PRN  pantoprazole    Tablet 40 milliGRAM(s) Oral before breakfast  polyethylene glycol 3350 17 Gram(s) Oral daily  senna 1 Tablet(s) Oral at bedtime PRN  sodium bicarbonate Mouth Rinse 10 milliLiter(s) Swish and Spit five times a day  ursodiol Capsule 300 milliGRAM(s) Oral two times a day with meals      Cardiovascular:   amLODIPine   Tablet 5 milliGRAM(s) Oral daily  losartan 100 milliGRAM(s) Oral daily      Immunologic:   filgrastim-sndz Injectable 480 MICROGram(s) SubCutaneous daily  immune   globulin 10% (GAMMAGARD) IVPB 20 Gram(s) IV Intermittent <User Schedule>  mycophenolate mofetil 1000 milliGRAM(s) Oral <User Schedule>  tacrolimus 2 milliGRAM(s) Oral every 12 hours      Other medications:   acetaminophen   Tablet .. 650 milliGRAM(s) Oral every 6 hours  acetaminophen   Tablet .. 650 milliGRAM(s) Oral <User Schedule>  benzonatate 100 milliGRAM(s) Oral every 8 hours  Biotene Dry Mouth Oral Rinse 5 milliLiter(s) Swish and Spit five times a day  diphenhydrAMINE   Injectable 25 milliGRAM(s) IV Push every 3 weeks  folic acid 1 milliGRAM(s) Oral daily  levETIRAcetam 500 milliGRAM(s) Oral two times a day  lidocaine/prilocaine Cream 1 Application(s) Topical daily  loratadine 10 milliGRAM(s) Oral daily  magnesium oxide 400 milliGRAM(s) Oral three times a day with meals  multivitamin 1 Tablet(s) Oral daily  nystatin Powder 1 Application(s) Topical every 8 hours  ondansetron Injectable 8 milliGRAM(s) IV Push every 8 hours  sodium chloride 0.9%. 1000 milliLiter(s) IV Continuous <Continuous>      PRN:   acetaminophen   Tablet .. 650 milliGRAM(s) Oral every 6 hours PRN  acetaminophen   Tablet .. 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 diff - started vancomycin 125mg po q hours   Platelet refractory - platelets dispensed as 1/2 units to be given over 3 hours q 12 hours     1. Infectious Disease:   acyclovir   Oral Tab/Cap 400 milliGRAM(s) Oral every 8 hours  cefepime   IVPB 2000 milliGRAM(s) IV Intermittent every 8 hours  clotrimazole Lozenge 1 Lozenge Oral five times a day  fluconAZOLE   Tablet 400 milliGRAM(s) Oral daily  vancomycin    Solution 125 milliGRAM(s) Oral every 6 hours    2. VOD Prophylaxis: Actigall, Glutamine, Heparin (dosed at 100 units / kg / day)     3. GI Prophylaxis:    pantoprazole    Tablet 40 milliGRAM(s) Oral before breakfast    4. Mouthcare - NS / NaHCO3 rinses, Mycelex, Biotene; Skin care     5. GVHD prophylaxis   mycophenolate mofetil 1000 milliGRAM(s) Oral <User Schedule>  tacrolimus 2 milliGRAM(s) Oral every 12 hours    6. Transfuse & replete electrolytes prn   potassium phosphate IVPB 15 milliMole(s) IV Intermittent once  magnesium oxide 400 milliGRAM(s) Oral three times a day with meals    7. IV hydration, daily weights, strict I&O, prn diuresis     8. PO intake as tolerated, nutrition follow up as needed, MVI, folic acid     9. Antiemetics, anti-diarrhea medications:   metoclopramide Injectable 10 milliGRAM(s) IV Push every 6 hours PRN  ondansetron Injectable 8 milliGRAM(s) IV Push every 8 hours    10. OOB as tolerated, physical therapy consult if needed     11. Monitor coags / fibrinogen 2x week, vitamin K as needed     12. Monitor closely for clinical changes, monitor for fevers     13. Emotional support provided, plan of care discussed and questions addressed     14. Patient education done regarding plan of care, restrictions and discharge planning     15. Continue regular social work input     I have written the above note for Dr. Logan who performed service with me in the room.   Janett Barrow NP-C ([phone])    I have seen and examined patient with NP, I agree with above note as scribed. 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    S: Patient seen and examined with HPC Transplant Team:   + bone pain   + mild, intermittent nausea   + one episode vomitting    Denies mouth / tongue / throat pain, dyspnea, cough, abdominal pain     O: Vitals:   Vital Signs Last 24 Hrs  T(C): 37 (2019 05:30), Max: 37.3 (2019 21:37)  T(F): 98.6 (2019 05:30), Max: 99.1 (2019 21:37)  HR: 93 (2019 05:30) (84 - 98)  BP: 119/80 (2019 05:30) (119/80 - 145/85)  RR: 20 (2019 05:30) (18 - 20)  SpO2: 100% (2019 05:30) (98% - 100%)    Admit weight: 111.4 kg     Daily Weight in k.7 (2019 10:00)  Today's weight: 111.4 kg    Intake / Output:    @ 07:01  -   @ 07:00  --------------------------------------------------------  IN: 1822 mL / OUT: 1650 mL / NET: 172 mL      PE:   Oropharynx: no erythema or ulcers; resolving blood blister on the lower lip   Oral Mucositis: n/a                                                       stGstrstastdstest:st st1st CVS: S1S2, RRR  Lungs: CTA throughout bilaterally   Abdomen: soft, NT/ND, normoactive BS x 4Q  Extremities: no edema  Gastric Mucositis: n/a                                                 stGstrstastdstest:st st1st Intestinal Mucositis: n/a                                             stGstrstastdstest:st st1st Skin: no rash  TLC: C/D/I  Neuro: A&O x 3   Pain: chronic back pain/ bone pain 7/10      Labs:                         8.8    6.7   )-----------( 22       ( 2019 06:57 )             25.9     2019 06:57    143    |  107    |  10     ----------------------------<  96     3.6     |  24     |  0.79     Ca    10.5       2019 06:57  Phos  2.1       2019 06:57  Mg     1.5       2019 06:57    TPro  6.0    /  Alb  3.4    /  TBili  0.2    /  DBili  x      /  AST  18     /  ALT  16     /  AlkPhos  169    2019 06:57    PT/INR - ( 2019 06:57 )   PT: 11.2 sec;   INR: 0.98 ratio    PTT - ( 2019 06:57 )  PTT:36.4 sec      LIVER FUNCTIONS - ( 2019 06:57 )  Alb: 3.4 g/dL / Pro: 6.0 g/dL / ALK PHOS: 169 U/L / ALT: 16 U/L / AST: 18 U/L / GGT: x                 Cultures:     Culture - Urine (19 @ 18:41)    Specimen Source: .Urine Clean Catch (Midstream)    Culture Results:   <10,000 CFU/ml Normal Urogenital everton present    Culture - Blood (19 @ 18:30)    Specimen Source: .Blood Blood-Peripheral    Culture Results:   No growth at 5 days.      Radiology:     from: Xray Chest 1 View- PORTABLE-Urgent (19 @ 09:12)     IMPRESSION:    1.  The lungs are clear.        Meds:   Antimicrobials:   acyclovir   Oral Tab/Cap 400 milliGRAM(s) Oral every 8 hours    cefepime   IVPB 2000 milliGRAM(s) IV Intermittent every 8 hours  clotrimazole Lozenge 1 Lozenge Oral five times a day  fluconAZOLE   Tablet 400 milliGRAM(s) Oral daily  vancomycin    Solution 125 milliGRAM(s) Oral every 6 hours      Heme / Onc:   heparin  Infusion 464 Unit(s)/Hr IV Continuous <Continuous>      GI:  docusate sodium 100 milliGRAM(s) Oral three times a day PRN  pantoprazole    Tablet 40 milliGRAM(s) Oral before breakfast  polyethylene glycol 3350 17 Gram(s) Oral daily  senna 1 Tablet(s) Oral at bedtime PRN  sodium bicarbonate Mouth Rinse 10 milliLiter(s) Swish and Spit five times a day  ursodiol Capsule 300 milliGRAM(s) Oral two times a day with meals      Cardiovascular:   amLODIPine   Tablet 5 milliGRAM(s) Oral daily  losartan 100 milliGRAM(s) Oral daily      Immunologic:   filgrastim-sndz Injectable 480 MICROGram(s) SubCutaneous daily  immune   globulin 10% (GAMMAGARD) IVPB 20 Gram(s) IV Intermittent <User Schedule>  mycophenolate mofetil 1000 milliGRAM(s) Oral <User Schedule>  tacrolimus 2 milliGRAM(s) Oral every 12 hours      Other medications:   acetaminophen   Tablet .. 650 milliGRAM(s) Oral every 6 hours  acetaminophen   Tablet .. 650 milliGRAM(s) Oral <User Schedule>  benzonatate 100 milliGRAM(s) Oral every 8 hours  Biotene Dry Mouth Oral Rinse 5 milliLiter(s) Swish and Spit five times a day  diphenhydrAMINE   Injectable 25 milliGRAM(s) IV Push every 3 weeks  folic acid 1 milliGRAM(s) Oral daily  levETIRAcetam 500 milliGRAM(s) Oral two times a day  lidocaine/prilocaine Cream 1 Application(s) Topical daily  loratadine 10 milliGRAM(s) Oral daily  magnesium oxide 400 milliGRAM(s) Oral three times a day with meals  multivitamin 1 Tablet(s) Oral daily  nystatin Powder 1 Application(s) Topical every 8 hours  ondansetron Injectable 8 milliGRAM(s) IV Push every 8 hours  sodium chloride 0.9%. 1000 milliLiter(s) IV Continuous <Continuous>      PRN:   acetaminophen   Tablet .. 650 milliGRAM(s) Oral every 6 hours PRN  acetaminophen   Tablet .. 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 diff - started vancomycin 125mg po q hours   Platelet refractory - platelets dispensed as 1/2 units to be given over 3 hours q 12 hours     1. Infectious Disease:   acyclovir   Oral Tab/Cap 400 milliGRAM(s) Oral every 8 hours  cefepime   IVPB 2000 milliGRAM(s) IV Intermittent every 8 hours  clotrimazole Lozenge 1 Lozenge Oral five times a day  fluconAZOLE   Tablet 400 milliGRAM(s) Oral daily  vancomycin    Solution 125 milliGRAM(s) Oral every 6 hours    2. VOD Prophylaxis: Actigall, Glutamine, Heparin (dosed at 100 units / kg / day)     3. GI Prophylaxis:    pantoprazole    Tablet 40 milliGRAM(s) Oral before breakfast    4. Mouthcare - NS / NaHCO3 rinses, Mycelex, Biotene; Skin care     5. GVHD prophylaxis   mycophenolate mofetil 1000 milliGRAM(s) Oral <User Schedule>  tacrolimus 2 milliGRAM(s) Oral every 12 hours    6. Transfuse & replete electrolytes prn   potassium phosphate IVPB 15 milliMole(s) IV Intermittent once  magnesium oxide 400 milliGRAM(s) Oral three times a day with meals    7. IV hydration, daily weights, strict I&O, prn diuresis     8. PO intake as tolerated, nutrition follow up as needed, MVI, folic acid     9. Antiemetics, anti-diarrhea medications:   metoclopramide Injectable 10 milliGRAM(s) IV Push every 6 hours PRN  ondansetron Injectable 8 milliGRAM(s) IV Push every 8 hours    10. OOB as tolerated, physical therapy consult if needed     11. Monitor coags / fibrinogen 2x week, vitamin K as needed     12. Monitor closely for clinical changes, monitor for fevers     13. Emotional support provided, plan of care discussed and questions addressed     14. Patient education done regarding plan of care, restrictions and discharge planning     15. Continue regular social work input     I have written the above note for Dr. Logan who performed service with me in the room.   Janett Barrow NP-C ([phone])    I have seen and examined patient with NP, I agree with above note as scribed. 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    S: Patient seen and examined with HPC Transplant Team:   + bone pain   + mild, intermittent nausea   + one episode vomitting    Denies mouth / tongue / throat pain, dyspnea, cough, abdominal pain     O: Vitals:   Vital Signs Last 24 Hrs  T(C): 37 (2019 05:30), Max: 37.3 (2019 21:37)  T(F): 98.6 (2019 05:30), Max: 99.1 (2019 21:37)  HR: 93 (2019 05:30) (84 - 98)  BP: 119/80 (2019 05:30) (119/80 - 145/85)  RR: 20 (2019 05:30) (18 - 20)  SpO2: 100% (2019 05:30) (98% - 100%)    Admit weight: 111.4 kg     Daily Weight in k.7 (2019 10:00)  Today's weight: 111.4 kg    Intake / Output:    @ 07:01  -   @ 07:00  --------------------------------------------------------  IN: 1822 mL / OUT: 1650 mL / NET: 172 mL      PE:   Oropharynx: no erythema or ulcers; resolving blood blister on the lower lip   Oral Mucositis: n/a                                                       stGstrstastdstest:st st1st CVS: S1S2, RRR  Lungs: CTA throughout bilaterally   Abdomen: soft, NT/ND, normoactive BS x 4Q  Extremities: no edema  Gastric Mucositis: n/a                                                 stGstrstastdstest:st st1st Intestinal Mucositis: n/a                                             stGstrstastdstest:st st1st Skin: no rash  TLC: C/D/I  Neuro: A&O x 3   Pain: chronic back pain/ bone pain 7/10      Labs:                         8.8    6.7   )-----------( 22       ( 2019 06:57 )             25.9     2019 06:57    143    |  107    |  10     ----------------------------<  96     3.6     |  24     |  0.79     Ca    10.5       2019 06:57  Phos  2.1       2019 06:57  Mg     1.5       2019 06:57    TPro  6.0    /  Alb  3.4    /  TBili  0.2    /  DBili  x      /  AST  18     /  ALT  16     /  AlkPhos  169    2019 06:57    PT/INR - ( 2019 06:57 )   PT: 11.2 sec;   INR: 0.98 ratio    PTT - ( 2019 06:57 )  PTT:36.4 sec      LIVER FUNCTIONS - ( 2019 06:57 )  Alb: 3.4 g/dL / Pro: 6.0 g/dL / ALK PHOS: 169 U/L / ALT: 16 U/L / AST: 18 U/L / GGT: x                 Cultures:     Culture - Urine (19 @ 18:41)    Specimen Source: .Urine Clean Catch (Midstream)    Culture Results:   <10,000 CFU/ml Normal Urogenital everton present    Culture - Blood (19 @ 18:30)    Specimen Source: .Blood Blood-Peripheral    Culture Results:   No growth at 5 days.      Radiology:     from: Xray Chest 1 View- PORTABLE-Urgent (19 @ 09:12)     IMPRESSION:    1.  The lungs are clear.        Meds:   Antimicrobials:   acyclovir   Oral Tab/Cap 400 milliGRAM(s) Oral every 8 hours    clotrimazole Lozenge 1 Lozenge Oral five times a day  fluconAZOLE   Tablet 400 milliGRAM(s) Oral daily  vancomycin    Solution 125 milliGRAM(s) Oral every 6 hours      Heme / Onc:   heparin  Infusion 464 Unit(s)/Hr IV Continuous <Continuous>      GI:  docusate sodium 100 milliGRAM(s) Oral three times a day PRN  pantoprazole    Tablet 40 milliGRAM(s) Oral before breakfast  polyethylene glycol 3350 17 Gram(s) Oral daily  senna 1 Tablet(s) Oral at bedtime PRN  sodium bicarbonate Mouth Rinse 10 milliLiter(s) Swish and Spit five times a day  ursodiol Capsule 300 milliGRAM(s) Oral two times a day with meals      Cardiovascular:   amLODIPine   Tablet 5 milliGRAM(s) Oral daily  losartan 100 milliGRAM(s) Oral daily      Immunologic:   immune   globulin 10% (GAMMAGARD) IVPB 20 Gram(s) IV Intermittent <User Schedule>  mycophenolate mofetil 1000 milliGRAM(s) Oral <User Schedule>  tacrolimus 2 milliGRAM(s) Oral every 12 hours      Other medications:   acetaminophen   Tablet .. 650 milliGRAM(s) Oral every 6 hours  acetaminophen   Tablet .. 650 milliGRAM(s) Oral <User Schedule>  benzonatate 100 milliGRAM(s) Oral every 8 hours  Biotene Dry Mouth Oral Rinse 5 milliLiter(s) Swish and Spit five times a day  diphenhydrAMINE   Injectable 25 milliGRAM(s) IV Push every 3 weeks  folic acid 1 milliGRAM(s) Oral daily  levETIRAcetam 500 milliGRAM(s) Oral two times a day  lidocaine/prilocaine Cream 1 Application(s) Topical daily  loratadine 10 milliGRAM(s) Oral daily  magnesium oxide 400 milliGRAM(s) Oral three times a day with meals  multivitamin 1 Tablet(s) Oral daily  nystatin Powder 1 Application(s) Topical every 8 hours  ondansetron Injectable 8 milliGRAM(s) IV Push every 8 hours  sodium chloride 0.9%. 1000 milliLiter(s) IV Continuous <Continuous>      PRN:   acetaminophen   Tablet .. 650 milliGRAM(s) Oral every 6 hours PRN  acetaminophen   Tablet .. 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 diff - started vancomycin 125mg po q hours   Platelet refractory - platelets dispensed as 1/2 units to be given over 3 hours q 12 hours   increased back/bone pain with nausea- increased Dilaudid, added Ativan prn and Emend 80mg x 3 days  Engrafted. ANC 5100. stopped Zarxio. discontinued Cefepime.    1. Infectious Disease:   acyclovir   Oral Tab/Cap 400 milliGRAM(s) Oral every 8 hours  clotrimazole Lozenge 1 Lozenge Oral five times a day  fluconAZOLE   Tablet 400 milliGRAM(s) Oral daily  vancomycin    Solution 125 milliGRAM(s) Oral every 6 hours    2. VOD Prophylaxis: Actigall, Glutamine, Heparin (dosed at 100 units / kg / day)     3. GI Prophylaxis:    pantoprazole    Tablet 40 milliGRAM(s) Oral before breakfast    4. Mouthcare - NS / NaHCO3 rinses, Mycelex, Biotene; Skin care     5. GVHD prophylaxis   mycophenolate mofetil 1000 milliGRAM(s) Oral <User Schedule>  tacrolimus 2 milliGRAM(s) Oral every 12 hours    6. Transfuse & replete electrolytes prn   potassium phosphate IVPB 15 milliMole(s) IV Intermittent once  magnesium oxide 400 milliGRAM(s) Oral three times a day with meals    7. IV hydration, daily weights, strict I&O, prn diuresis     8. PO intake as tolerated, nutrition follow up as needed, MVI, folic acid     9. Antiemetics, anti-diarrhea medications:   metoclopramide Injectable 10 milliGRAM(s) IV Push every 6 hours PRN  ondansetron Injectable 8 milliGRAM(s) IV Push every 8 hours    10. OOB as tolerated, physical therapy consult if needed     11. Monitor coags / fibrinogen 2x week, vitamin K as needed     12. Monitor closely for clinical changes, monitor for fevers     13. Emotional support provided, plan of care discussed and questions addressed     14. Patient education done regarding plan of care, restrictions and discharge planning     15. Continue regular social work input     I have written the above note for Dr. Logan who performed service with me in the room.   Janett Barrow NP-C ([phone])    I have seen and examined patient with NP, I agree with above note as scribed. 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    S: Patient seen and examined with [facility] Transplant Team:   + bone pain   + mild, intermittent nausea   + one episode vomitting    Denies mouth / tongue / throat pain, dyspnea, cough, abdominal pain     O: Vitals:   Vital Signs Last 24 Hrs  T(C): 37 (2019 05:30), Max: 37.3 (2019 21:37)  T(F): 98.6 (2019 05:30), Max: 99.1 (2019 21:37)  HR: 93 (2019 05:30) (84 - 98)  BP: 119/80 (2019 05:30) (119/80 - 145/85)  RR: 20 (2019 05:30) (18 - 20)  SpO2: 100% (2019 05:30) (98% - 100%)    Admit weight: 111.4 kg     Daily Weight in k.7 (2019 10:00)  Today's weight: 111.4 kg    Intake / Output:    @ 07:01  -   @ 07:00  --------------------------------------------------------  IN: 1822 mL / OUT: 1650 mL / NET: 172 mL      PE:   Oropharynx: no erythema or ulcers; resolving blood blister on the lower lip   Oral Mucositis: n/a                                                       stGstrstastdstest:st st1st CVS: S1S2, RRR  Lungs: CTA throughout bilaterally   Abdomen: soft, NT/ND, normoactive BS x 4Q  Extremities: no edema  Gastric Mucositis: n/a                                                 stGstrstastdstest:st st1st Intestinal Mucositis: n/a                                             stGstrstastdstest:st st1st Skin: no rash  TLC: C/D/I  Neuro: A&O x 3   Pain: chronic back pain/ bone pain 7/10      Labs:                         8.8    6.7   )-----------( 22       ( 2019 06:57 )             25.9     2019 06:57    143    |  107    |  10     ----------------------------<  96     3.6     |  24     |  0.79     Ca    10.5       2019 06:57  Phos  2.1       2019 06:57  Mg     1.5       2019 06:57    TPro  6.0    /  Alb  3.4    /  TBili  0.2    /  DBili  x      /  AST  18     /  ALT  16     /  AlkPhos  169    2019 06:57    PT/INR - ( 2019 06:57 )   PT: 11.2 sec;   INR: 0.98 ratio    PTT - ( 2019 06:57 )  PTT:36.4 sec      LIVER FUNCTIONS - ( 2019 06:57 )  Alb: 3.4 g/dL / Pro: 6.0 g/dL / ALK PHOS: 169 U/L / ALT: 16 U/L / AST: 18 U/L / GGT: x                 Cultures:     Culture - Urine (19 @ 18:41)    Specimen Source: .Urine Clean Catch (Midstream)    Culture Results:   <10,000 CFU/ml Normal Urogenital everton present    Culture - Blood (19 @ 18:30)    Specimen Source: .Blood Blood-Peripheral    Culture Results:   No growth at 5 days.      Radiology:     from: Xray Chest 1 View- PORTABLE-Urgent (19 @ 09:12)     IMPRESSION:    1.  The lungs are clear.        Meds:   Antimicrobials:   acyclovir   Oral Tab/Cap 400 milliGRAM(s) Oral every 8 hours    clotrimazole Lozenge 1 Lozenge Oral five times a day  fluconAZOLE   Tablet 400 milliGRAM(s) Oral daily  vancomycin    Solution 125 milliGRAM(s) Oral every 6 hours      Heme / Onc:   heparin  Infusion 464 Unit(s)/Hr IV Continuous <Continuous>      GI:  docusate sodium 100 milliGRAM(s) Oral three times a day PRN  pantoprazole    Tablet 40 milliGRAM(s) Oral before breakfast  polyethylene glycol 3350 17 Gram(s) Oral daily  senna 1 Tablet(s) Oral at bedtime PRN  sodium bicarbonate Mouth Rinse 10 milliLiter(s) Swish and Spit five times a day  ursodiol Capsule 300 milliGRAM(s) Oral two times a day with meals      Cardiovascular:   amLODIPine   Tablet 5 milliGRAM(s) Oral daily  losartan 100 milliGRAM(s) Oral daily      Immunologic:   immune   globulin 10% (GAMMAGARD) IVPB 20 Gram(s) IV Intermittent <User Schedule>  mycophenolate mofetil 1000 milliGRAM(s) Oral <User Schedule>  tacrolimus 2 milliGRAM(s) Oral every 12 hours      Other medications:   acetaminophen   Tablet .. 650 milliGRAM(s) Oral every 6 hours  acetaminophen   Tablet .. 650 milliGRAM(s) Oral <User Schedule>  benzonatate 100 milliGRAM(s) Oral every 8 hours  Biotene Dry Mouth Oral Rinse 5 milliLiter(s) Swish and Spit five times a day  diphenhydrAMINE   Injectable 25 milliGRAM(s) IV Push every 3 weeks  folic acid 1 milliGRAM(s) Oral daily  levETIRAcetam 500 milliGRAM(s) Oral two times a day  lidocaine/prilocaine Cream 1 Application(s) Topical daily  loratadine 10 milliGRAM(s) Oral daily  magnesium oxide 400 milliGRAM(s) Oral three times a day with meals  multivitamin 1 Tablet(s) Oral daily  nystatin Powder 1 Application(s) Topical every 8 hours  ondansetron Injectable 8 milliGRAM(s) IV Push every 8 hours  sodium chloride 0.9%. 1000 milliLiter(s) IV Continuous <Continuous>      PRN:   acetaminophen   Tablet .. 650 milliGRAM(s) Oral every 6 hours PRN  acetaminophen   Tablet .. 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 diff - started vancomycin 125mg po q hours   Platelet refractory - platelets dispensed as 1/2 units to be given over 3 hours q 12 hours   increased back/bone pain with nausea- increased Dilaudid, added Ativan prn and Emend 80mg x 3 days  Engrafted. ANC 5100. stopped Zarxio. discontinued Cefepime.    1. Infectious Disease:   acyclovir   Oral Tab/Cap 400 milliGRAM(s) Oral every 8 hours  clotrimazole Lozenge 1 Lozenge Oral five times a day  fluconAZOLE   Tablet 400 milliGRAM(s) Oral daily  vancomycin    Solution 125 milliGRAM(s) Oral every 6 hours    2. VOD Prophylaxis: Actigall, Glutamine, Heparin (dosed at 100 units / kg / day)     3. GI Prophylaxis:    pantoprazole    Tablet 40 milliGRAM(s) Oral before breakfast    4. Mouth care - NS / NaHCO3 rinses, Mycelex, Biotene; Skin care     5. GVHD prophylaxis   mycophenolate mofetil 1000 milliGRAM(s) Oral <User Schedule>  tacrolimus 2 milliGRAM(s) Oral every 12 hours    6. Transfuse & replete electrolytes prn   potassium phosphate IVPB 15 milliMole(s) IV Intermittent once  magnesium oxide 400 milliGRAM(s) Oral three times a day with meals    7. IV hydration, daily weights, strict I&O, prn diuresis     8. PO intake as tolerated, nutrition follow up as needed, MVI, folic acid     9. Antiemetics, anti-diarrhea medications:   metoclopramide Injectable 10 milliGRAM(s) IV Push every 6 hours PRN  ondansetron Injectable 8 milliGRAM(s) IV Push every 8 hours    10. OOB as tolerated, physical therapy consult if needed     11. Monitor coags / fibrinogen 2x week, vitamin K as needed     12. Monitor closely for clinical changes, monitor for fevers     13. Emotional support provided, plan of care discussed and questions addressed     14. Patient education done regarding plan of care, restrictions and discharge planning     15. Continue regular social work input     I have written the above note for Dr. Logan who performed service with me in the room.   Janett Barrow NP-C ([phone])    I have seen and examined patient with NP, I agree with above note as scribed.

## 2019-03-01 LAB
ALBUMIN SERPL ELPH-MCNC: 3.3 G/DL — SIGNIFICANT CHANGE UP (ref 3.3–5)
ALP SERPL-CCNC: 176 U/L — HIGH (ref 40–120)
ALT FLD-CCNC: 14 U/L — SIGNIFICANT CHANGE UP (ref 10–45)
ANION GAP SERPL CALC-SCNC: 11 MMOL/L — SIGNIFICANT CHANGE UP (ref 5–17)
AST SERPL-CCNC: 18 U/L — SIGNIFICANT CHANGE UP (ref 10–40)
BASOPHILS # BLD AUTO: 0 K/UL — SIGNIFICANT CHANGE UP (ref 0–0.2)
BILIRUB DIRECT SERPL-MCNC: <0.1 MG/DL — SIGNIFICANT CHANGE UP (ref 0–0.2)
BILIRUB INDIRECT FLD-MCNC: >0.1 MG/DL — LOW (ref 0.2–1)
BILIRUB SERPL-MCNC: 0.2 MG/DL — SIGNIFICANT CHANGE UP (ref 0.2–1.2)
BLASTS # FLD: 1 % — HIGH (ref 0–0)
BLD GP AB SCN SERPL QL: NEGATIVE — SIGNIFICANT CHANGE UP
BUN SERPL-MCNC: 11 MG/DL — SIGNIFICANT CHANGE UP (ref 7–23)
CALCIUM SERPL-MCNC: 10.4 MG/DL — SIGNIFICANT CHANGE UP (ref 8.4–10.5)
CHLORIDE SERPL-SCNC: 105 MMOL/L — SIGNIFICANT CHANGE UP (ref 96–108)
CO2 SERPL-SCNC: 26 MMOL/L — SIGNIFICANT CHANGE UP (ref 22–31)
CREAT SERPL-MCNC: 0.87 MG/DL — SIGNIFICANT CHANGE UP (ref 0.5–1.3)
EOSINOPHIL # BLD AUTO: 0 K/UL — SIGNIFICANT CHANGE UP (ref 0–0.5)
GLUCOSE SERPL-MCNC: 87 MG/DL — SIGNIFICANT CHANGE UP (ref 70–99)
HCT VFR BLD CALC: 25.4 % — LOW (ref 34.5–45)
HGB BLD-MCNC: 8.6 G/DL — LOW (ref 11.5–15.5)
LDH SERPL L TO P-CCNC: 303 U/L — HIGH (ref 50–242)
LYMPHOCYTES # BLD AUTO: SIGNIFICANT CHANGE UP (ref 1–3.3)
MAGNESIUM SERPL-MCNC: 1.8 MG/DL — SIGNIFICANT CHANGE UP (ref 1.6–2.6)
MCHC RBC-ENTMCNC: 33.7 GM/DL — SIGNIFICANT CHANGE UP (ref 32–36)
MCHC RBC-ENTMCNC: 34.4 PG — HIGH (ref 27–34)
MCV RBC AUTO: 102 FL — HIGH (ref 80–100)
METAMYELOCYTES # FLD: 2 % — HIGH (ref 0–0)
MONOCYTES # BLD AUTO: 1.9 K/UL — HIGH (ref 0–0.9)
MONOCYTES NFR BLD AUTO: 22 % — HIGH (ref 2–14)
MYELOCYTES NFR BLD: 2 % — HIGH (ref 0–0)
NEUTROPHILS # BLD AUTO: 7.2 K/UL — SIGNIFICANT CHANGE UP (ref 1.8–7.4)
NEUTROPHILS NFR BLD AUTO: 61 % — SIGNIFICANT CHANGE UP (ref 43–77)
NEUTS BAND # BLD: 12 % — HIGH (ref 0–8)
NRBC # BLD: 1 /100 — HIGH (ref 0–0)
PHOSPHATE SERPL-MCNC: 2.7 MG/DL — SIGNIFICANT CHANGE UP (ref 2.5–4.5)
PLAT MORPH BLD: NORMAL — SIGNIFICANT CHANGE UP
PLATELET # BLD AUTO: 25 K/UL — LOW (ref 150–400)
POTASSIUM SERPL-MCNC: 3.9 MMOL/L — SIGNIFICANT CHANGE UP (ref 3.5–5.3)
POTASSIUM SERPL-SCNC: 3.9 MMOL/L — SIGNIFICANT CHANGE UP (ref 3.5–5.3)
PROT SERPL-MCNC: 5.8 G/DL — LOW (ref 6–8.3)
RBC # BLD: 2.49 M/UL — LOW (ref 3.8–5.2)
RBC # FLD: 13 % — SIGNIFICANT CHANGE UP (ref 10.3–14.5)
RBC BLD AUTO: SIGNIFICANT CHANGE UP
RH IG SCN BLD-IMP: POSITIVE — SIGNIFICANT CHANGE UP
SODIUM SERPL-SCNC: 142 MMOL/L — SIGNIFICANT CHANGE UP (ref 135–145)
WBC # BLD: 9.4 K/UL — SIGNIFICANT CHANGE UP (ref 3.8–10.5)
WBC # FLD AUTO: 9.4 K/UL — SIGNIFICANT CHANGE UP (ref 3.8–10.5)

## 2019-03-01 PROCEDURE — 99291 CRITICAL CARE FIRST HOUR: CPT

## 2019-03-01 PROCEDURE — 88291 CYTO/MOLECULAR REPORT: CPT

## 2019-03-01 RX ORDER — OXYCODONE HYDROCHLORIDE 5 MG/1
5 TABLET ORAL EVERY 6 HOURS
Qty: 0 | Refills: 0 | Status: DISCONTINUED | OUTPATIENT
Start: 2019-03-01 | End: 2019-03-04

## 2019-03-01 RX ORDER — TACROLIMUS 5 MG/1
1.5 CAPSULE ORAL EVERY 12 HOURS
Qty: 0 | Refills: 0 | Status: DISCONTINUED | OUTPATIENT
Start: 2019-03-01 | End: 2019-03-02

## 2019-03-01 RX ADMIN — Medication 5 MILLILITER(S): at 23:14

## 2019-03-01 RX ADMIN — MYCOPHENOLATE MOFETIL 1000 MILLIGRAM(S): 250 CAPSULE ORAL at 15:07

## 2019-03-01 RX ADMIN — Medication 10 MILLILITER(S): at 12:16

## 2019-03-01 RX ADMIN — NYSTATIN CREAM 1 APPLICATION(S): 100000 CREAM TOPICAL at 05:26

## 2019-03-01 RX ADMIN — Medication 125 MILLIGRAM(S): at 17:32

## 2019-03-01 RX ADMIN — MYCOPHENOLATE MOFETIL 1000 MILLIGRAM(S): 250 CAPSULE ORAL at 21:24

## 2019-03-01 RX ADMIN — SODIUM CHLORIDE 20 MILLILITER(S): 9 INJECTION INTRAMUSCULAR; INTRAVENOUS; SUBCUTANEOUS at 00:38

## 2019-03-01 RX ADMIN — Medication 125 MILLIGRAM(S): at 05:25

## 2019-03-01 RX ADMIN — AMLODIPINE BESYLATE 5 MILLIGRAM(S): 2.5 TABLET ORAL at 05:26

## 2019-03-01 RX ADMIN — Medication 1 LOZENGE: at 20:00

## 2019-03-01 RX ADMIN — Medication 125 MILLIGRAM(S): at 23:14

## 2019-03-01 RX ADMIN — HEPARIN SODIUM 4.64 UNIT(S)/HR: 5000 INJECTION INTRAVENOUS; SUBCUTANEOUS at 00:38

## 2019-03-01 RX ADMIN — URSODIOL 300 MILLIGRAM(S): 250 TABLET, FILM COATED ORAL at 17:32

## 2019-03-01 RX ADMIN — APREPITANT 80 MILLIGRAM(S): 80 CAPSULE ORAL at 12:16

## 2019-03-01 RX ADMIN — Medication 400 MILLIGRAM(S): at 05:25

## 2019-03-01 RX ADMIN — Medication 400 MILLIGRAM(S): at 15:07

## 2019-03-01 RX ADMIN — Medication 1 LOZENGE: at 16:25

## 2019-03-01 RX ADMIN — Medication 10 MILLILITER(S): at 23:14

## 2019-03-01 RX ADMIN — HYDROMORPHONE HYDROCHLORIDE 1.5 MILLIGRAM(S): 2 INJECTION INTRAMUSCULAR; INTRAVENOUS; SUBCUTANEOUS at 16:20

## 2019-03-01 RX ADMIN — HYDROMORPHONE HYDROCHLORIDE 1.5 MILLIGRAM(S): 2 INJECTION INTRAMUSCULAR; INTRAVENOUS; SUBCUTANEOUS at 20:04

## 2019-03-01 RX ADMIN — Medication 10 MILLILITER(S): at 07:46

## 2019-03-01 RX ADMIN — HYDROMORPHONE HYDROCHLORIDE 1.5 MILLIGRAM(S): 2 INJECTION INTRAMUSCULAR; INTRAVENOUS; SUBCUTANEOUS at 01:22

## 2019-03-01 RX ADMIN — MYCOPHENOLATE MOFETIL 1000 MILLIGRAM(S): 250 CAPSULE ORAL at 05:24

## 2019-03-01 RX ADMIN — Medication 1 LOZENGE: at 07:46

## 2019-03-01 RX ADMIN — Medication 5 MILLILITER(S): at 12:16

## 2019-03-01 RX ADMIN — Medication 1 TABLET(S): at 12:16

## 2019-03-01 RX ADMIN — Medication 5 MILLILITER(S): at 07:46

## 2019-03-01 RX ADMIN — HYDROMORPHONE HYDROCHLORIDE 1.5 MILLIGRAM(S): 2 INJECTION INTRAMUSCULAR; INTRAVENOUS; SUBCUTANEOUS at 23:44

## 2019-03-01 RX ADMIN — Medication 100 MILLIGRAM(S): at 21:24

## 2019-03-01 RX ADMIN — HYDROMORPHONE HYDROCHLORIDE 1.5 MILLIGRAM(S): 2 INJECTION INTRAMUSCULAR; INTRAVENOUS; SUBCUTANEOUS at 12:45

## 2019-03-01 RX ADMIN — Medication 1 LOZENGE: at 23:14

## 2019-03-01 RX ADMIN — Medication 5 MILLILITER(S): at 20:00

## 2019-03-01 RX ADMIN — HYDROMORPHONE HYDROCHLORIDE 1.5 MILLIGRAM(S): 2 INJECTION INTRAMUSCULAR; INTRAVENOUS; SUBCUTANEOUS at 19:34

## 2019-03-01 RX ADMIN — Medication 10 MILLILITER(S): at 20:00

## 2019-03-01 RX ADMIN — Medication 5 MILLILITER(S): at 16:25

## 2019-03-01 RX ADMIN — URSODIOL 300 MILLIGRAM(S): 250 TABLET, FILM COATED ORAL at 07:45

## 2019-03-01 RX ADMIN — TACROLIMUS 2 MILLIGRAM(S): 5 CAPSULE ORAL at 05:27

## 2019-03-01 RX ADMIN — TACROLIMUS 1.5 MILLIGRAM(S): 5 CAPSULE ORAL at 17:35

## 2019-03-01 RX ADMIN — Medication 1 LOZENGE: at 12:16

## 2019-03-01 RX ADMIN — NYSTATIN CREAM 1 APPLICATION(S): 100000 CREAM TOPICAL at 21:25

## 2019-03-01 RX ADMIN — HYDROMORPHONE HYDROCHLORIDE 1.5 MILLIGRAM(S): 2 INJECTION INTRAMUSCULAR; INTRAVENOUS; SUBCUTANEOUS at 06:01

## 2019-03-01 RX ADMIN — MAGNESIUM OXIDE 400 MG ORAL TABLET 400 MILLIGRAM(S): 241.3 TABLET ORAL at 17:33

## 2019-03-01 RX ADMIN — ONDANSETRON 8 MILLIGRAM(S): 8 TABLET, FILM COATED ORAL at 05:26

## 2019-03-01 RX ADMIN — MAGNESIUM OXIDE 400 MG ORAL TABLET 400 MILLIGRAM(S): 241.3 TABLET ORAL at 12:16

## 2019-03-01 RX ADMIN — LEVETIRACETAM 500 MILLIGRAM(S): 250 TABLET, FILM COATED ORAL at 17:33

## 2019-03-01 RX ADMIN — ONDANSETRON 8 MILLIGRAM(S): 8 TABLET, FILM COATED ORAL at 21:24

## 2019-03-01 RX ADMIN — HYDROMORPHONE HYDROCHLORIDE 1.5 MILLIGRAM(S): 2 INJECTION INTRAMUSCULAR; INTRAVENOUS; SUBCUTANEOUS at 12:15

## 2019-03-01 RX ADMIN — Medication 1 MILLIGRAM(S): at 12:16

## 2019-03-01 RX ADMIN — Medication 100 MILLIGRAM(S): at 15:07

## 2019-03-01 RX ADMIN — FLUCONAZOLE 400 MILLIGRAM(S): 150 TABLET ORAL at 12:16

## 2019-03-01 RX ADMIN — LEVETIRACETAM 500 MILLIGRAM(S): 250 TABLET, FILM COATED ORAL at 05:25

## 2019-03-01 RX ADMIN — HYDROMORPHONE HYDROCHLORIDE 1.5 MILLIGRAM(S): 2 INJECTION INTRAMUSCULAR; INTRAVENOUS; SUBCUTANEOUS at 16:50

## 2019-03-01 RX ADMIN — HYDROMORPHONE HYDROCHLORIDE 1.5 MILLIGRAM(S): 2 INJECTION INTRAMUSCULAR; INTRAVENOUS; SUBCUTANEOUS at 09:04

## 2019-03-01 RX ADMIN — HEPARIN SODIUM 4.64 UNIT(S)/HR: 5000 INJECTION INTRAVENOUS; SUBCUTANEOUS at 23:25

## 2019-03-01 RX ADMIN — Medication 125 MILLIGRAM(S): at 12:18

## 2019-03-01 RX ADMIN — NYSTATIN CREAM 1 APPLICATION(S): 100000 CREAM TOPICAL at 14:30

## 2019-03-01 RX ADMIN — Medication 10 MILLILITER(S): at 16:25

## 2019-03-01 RX ADMIN — Medication 400 MILLIGRAM(S): at 21:24

## 2019-03-01 RX ADMIN — Medication 100 MILLIGRAM(S): at 05:25

## 2019-03-01 RX ADMIN — LOSARTAN POTASSIUM 100 MILLIGRAM(S): 100 TABLET, FILM COATED ORAL at 05:25

## 2019-03-01 RX ADMIN — HYDROMORPHONE HYDROCHLORIDE 1.5 MILLIGRAM(S): 2 INJECTION INTRAMUSCULAR; INTRAVENOUS; SUBCUTANEOUS at 09:34

## 2019-03-01 RX ADMIN — PANTOPRAZOLE SODIUM 40 MILLIGRAM(S): 20 TABLET, DELAYED RELEASE ORAL at 05:26

## 2019-03-01 RX ADMIN — SODIUM CHLORIDE 20 MILLILITER(S): 9 INJECTION INTRAMUSCULAR; INTRAVENOUS; SUBCUTANEOUS at 23:14

## 2019-03-01 RX ADMIN — LORATADINE 10 MILLIGRAM(S): 10 TABLET ORAL at 12:18

## 2019-03-01 RX ADMIN — HYDROMORPHONE HYDROCHLORIDE 1.5 MILLIGRAM(S): 2 INJECTION INTRAMUSCULAR; INTRAVENOUS; SUBCUTANEOUS at 01:52

## 2019-03-01 RX ADMIN — MAGNESIUM OXIDE 400 MG ORAL TABLET 400 MILLIGRAM(S): 241.3 TABLET ORAL at 07:45

## 2019-03-01 RX ADMIN — ONDANSETRON 8 MILLIGRAM(S): 8 TABLET, FILM COATED ORAL at 15:07

## 2019-03-01 RX ADMIN — HYDROMORPHONE HYDROCHLORIDE 1.5 MILLIGRAM(S): 2 INJECTION INTRAMUSCULAR; INTRAVENOUS; SUBCUTANEOUS at 05:31

## 2019-03-01 NOTE — PROGRESS NOTE ADULT - SUBJECTIVE AND OBJECTIVE BOX
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    S: Patient seen and examined with HPC Transplant Team:   + bone pain   + mild, intermittent nausea   + one episode vomitting    Denies mouth / tongue / throat pain, dyspnea, cough, abdominal pain      O: Vitals:   Vital Signs Last 24 Hrs  T(C): 36.3 (01 Mar 2019 05:21), Max: 37.2 (2019 14:45)  T(F): 97.3 (01 Mar 2019 05:21), Max: 99 (2019 14:45)  HR: 84 (01 Mar 2019 05:21) (84 - 96)  BP: 137/81 (01 Mar 2019 05:21) (132/92 - 144/77)  RR: 18 (01 Mar 2019 05:21) (18 - 20)  SpO2: 97% (01 Mar 2019 05:21) (96% - 100%)    Admit weight: 111.4 kg  Daily     Daily Weight in k.4 (2019 10:45)    Intake / Output:    @ 07:01  -  - @ 07:00  --------------------------------------------------------  IN: 1682.1 mL / OUT: 1800 mL / NET: -117.9 mL        PE:   Oropharynx: no erythema or ulcers; resolving blood blister on the lower lip   Oral Mucositis: n/a                                                       stGstrstastdstest:st st1st CVS: S1S2, RRR  Lungs: CTA throughout bilaterally   Abdomen: soft, NT/ND, normoactive BS x 4Q  Extremities: no edema  Gastric Mucositis: n/a                                                 stGstrstastdstest:st st1st Intestinal Mucositis: n/a                                             stGstrstastdstest:st st1st Skin: no rash  TLC: C/D/I  Neuro: A&O x 3   Pain: chronic back pain/ bone pain 7/10      Labs:   CBC Full  -  ( 01 Mar 2019 06:58 )  WBC Count : 9.4 K/uL  Hemoglobin : 8.6 g/dL  Hematocrit : 25.4 %  Platelet Count - Automated : 25 K/uL  Mean Cell Volume : 102.0 fl  Mean Cell Hemoglobin : 34.4 pg  Mean Cell Hemoglobin Concentration : 33.7 gm/dL  Auto Neutrophil # : x  Auto Lymphocyte # : x  Auto Monocyte # : x  Auto Eosinophil # : x  Auto Basophil # : x  Auto Neutrophil % : x  Auto Lymphocyte % : x  Auto Monocyte % : x  Auto Eosinophil % : x  Auto Basophil % : x                          8.6    9.4   )-----------( 25       ( 01 Mar 2019 06:58 )             25.4        02-28 @ 08:22  Tacrolimus 12.7                Cyclosporine --      Cultures:     Culture - Urine (19 @ 18:41)    Specimen Source: .Urine Clean Catch (Midstream)    Culture Results:   <10,000 CFU/ml Normal Urogenital everton present    Culture - Blood (19 @ 18:30)    Specimen Source: .Blood Blood-Peripheral    Culture Results:   No growth at 5 days.      Radiology:     from: Xray Chest 1 View- PORTABLE-Urgent (19 @ 09:12)     IMPRESSION:    1.  The lungs are clear.      Meds:   Antimicrobials:   acyclovir   Oral Tab/Cap 400 milliGRAM(s) Oral every 8 hours  clotrimazole Lozenge 1 Lozenge Oral five times a day  fluconAZOLE   Tablet 400 milliGRAM(s) Oral daily  vancomycin    Solution 125 milliGRAM(s) Oral every 6 hours      Heme / Onc:   heparin  Infusion 464 Unit(s)/Hr IV Continuous <Continuous>      GI:  docusate sodium 100 milliGRAM(s) Oral three times a day PRN  pantoprazole    Tablet 40 milliGRAM(s) Oral before breakfast  polyethylene glycol 3350 17 Gram(s) Oral daily  senna 1 Tablet(s) Oral at bedtime PRN  sodium bicarbonate Mouth Rinse 10 milliLiter(s) Swish and Spit five times a day  ursodiol Capsule 300 milliGRAM(s) Oral two times a day with meals      Cardiovascular:   amLODIPine   Tablet 5 milliGRAM(s) Oral daily  losartan 100 milliGRAM(s) Oral daily      Immunologic:   immune   globulin 10% (GAMMAGARD) IVPB 20 Gram(s) IV Intermittent <User Schedule>  mycophenolate mofetil 1000 milliGRAM(s) Oral <User Schedule>  tacrolimus 2 milliGRAM(s) Oral every 12 hours      Other medications:   acetaminophen   Tablet .. 650 milliGRAM(s) Oral every 6 hours  acetaminophen   Tablet .. 650 milliGRAM(s) Oral <User Schedule>  aprepitant 80 milliGRAM(s) Oral daily  benzonatate 100 milliGRAM(s) Oral every 8 hours  Biotene Dry Mouth Oral Rinse 5 milliLiter(s) Swish and Spit five times a day  diphenhydrAMINE   Injectable 25 milliGRAM(s) IV Push every 3 weeks  folic acid 1 milliGRAM(s) Oral daily  levETIRAcetam 500 milliGRAM(s) Oral two times a day  lidocaine/prilocaine Cream 1 Application(s) Topical daily  loratadine 10 milliGRAM(s) Oral daily  magnesium oxide 400 milliGRAM(s) Oral three times a day with meals  multivitamin 1 Tablet(s) Oral daily  nystatin Powder 1 Application(s) Topical every 8 hours  ondansetron Injectable 8 milliGRAM(s) IV Push every 8 hours  sodium chloride 0.9%. 1000 milliLiter(s) IV Continuous <Continuous>      PRN:   acetaminophen   Tablet .. 650 milliGRAM(s) Oral every 6 hours PRN  acetaminophen   Tablet .. 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 diff - started vancomycin 125mg po q hours   Platelet refractory - platelets dispensed as 1/2 units to be given over 3 hours q 12 hours   increased back/bone pain with nausea- increased Dilaudid, added Ativan prn and Emend 80mg x 3 days  Engrafted. ANC 5100. stopped Zarxio. discontinued Cefepime.    1. Infectious Disease:   acyclovir   Oral Tab/Cap 400 milliGRAM(s) Oral every 8 hours  clotrimazole Lozenge 1 Lozenge Oral five times a day  fluconAZOLE   Tablet 400 milliGRAM(s) Oral daily  vancomycin    Solution 125 milliGRAM(s) Oral every 6 hours    2. VOD Prophylaxis: Actigall, Glutamine, Heparin (dosed at 100 units / kg / day)     3. GI Prophylaxis:    pantoprazole    Tablet 40 milliGRAM(s) Oral before breakfast    4. Mouth care - NS / NaHCO3 rinses, Mycelex, Biotene; Skin care     5. GVHD prophylaxis   mycophenolate mofetil 1000 milliGRAM(s) Oral <User Schedule>  tacrolimus 2 milliGRAM(s) Oral every 12 hours    6. Transfuse & replete electrolytes prn   potassium phosphate IVPB 15 milliMole(s) IV Intermittent once  magnesium oxide 400 milliGRAM(s) Oral three times a day with meals    7. IV hydration, daily weights, strict I&O, prn diuresis     8. PO intake as tolerated, nutrition follow up as needed, MVI, folic acid     9. Antiemetics, anti-diarrhea medications:   metoclopramide Injectable 10 milliGRAM(s) IV Push every 6 hours PRN  ondansetron Injectable 8 milliGRAM(s) IV Push every 8 hours    10. OOB as tolerated, physical therapy consult if needed     11. Monitor coags / fibrinogen 2x week, vitamin K as needed     12. Monitor closely for clinical changes, monitor for fevers     13. Emotional support provided, plan of care discussed and questions addressed     14. Patient education done regarding plan of care, restrictions and discharge planning     15. Continue regular social work input     I have written the above note for Dr. Logan who performed service with me in the room.   Janett Barrow NP-C ([phone])    I have seen and examined patient with NP, I agree with above note as scribed. 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    S: Patient seen and examined with HPC Transplant Team:   + bone pain   + mild, intermittent nausea   + one episode vomitting    Denies mouth / tongue / throat pain, dyspnea, cough, abdominal pain      O: Vitals:   Vital Signs Last 24 Hrs  T(C): 36.3 (01 Mar 2019 05:21), Max: 37.2 (2019 14:45)  T(F): 97.3 (01 Mar 2019 05:21), Max: 99 (2019 14:45)  HR: 84 (01 Mar 2019 05:21) (84 - 96)  BP: 137/81 (01 Mar 2019 05:21) (132/92 - 144/77)  RR: 18 (01 Mar 2019 05:21) (18 - 20)  SpO2: 97% (01 Mar 2019 05:21) (96% - 100%)    Admit weight: 111.4 kg  Daily     Daily Weight in k.4 (2019 10:45)    Intake / Output:    @ 07:01  -  - @ 07:00  --------------------------------------------------------  IN: 1682.1 mL / OUT: 1800 mL / NET: -117.9 mL        PE:   Oropharynx: no erythema or ulcers; resolving blood blister on the lower lip   Oral Mucositis: n/a                                                       stGstrstastdstest:st st1st CVS: S1S2, RRR  Lungs: CTA throughout bilaterally   Abdomen: soft, NT/ND, normoactive BS x 4Q  Extremities: no edema  Gastric Mucositis: n/a                                                 stGstrstastdstest:st st1st Intestinal Mucositis: n/a                                             stGstrstastdstest:st st1st Skin: no rash  TLC: C/D/I  Neuro: A&O x 3   Pain: chronic back pain/ bone pain 7/10      Labs:     CBC Full  -  ( 01 Mar 2019 06:58 )  WBC Count : 9.4 K/uL  Hemoglobin : 8.6 g/dL  Hematocrit : 25.4 %  Platelet Count - Automated : 25 K/uL  Mean Cell Volume : 102.0 fl  Mean Cell Hemoglobin : 34.4 pg  Mean Cell Hemoglobin Concentration : 33.7 gm/dL  Auto Neutrophil # : x  Auto Lymphocyte # : x  Auto Monocyte # : x  Auto Eosinophil # : x  Auto Basophil # : x  Auto Neutrophil % : x  Auto Lymphocyte % : x  Auto Monocyte % : x  Auto Eosinophil % : x  Auto Basophil % : x                          8.6    9.4   )-----------( 25       ( 01 Mar 2019 06:58 )             25.4       01 Mar 2019 06:58    142    |  105    |  11     ----------------------------<  87     3.9     |  26     |  0.87     Ca    10.4       01 Mar 2019 06:58  Phos  2.7       01 Mar 2019 06:58  Mg     1.8       01 Mar 2019 06:58    TPro  5.8    /  Alb  3.3    /  TBili  0.2    /  DBili  <0.1   /  AST  18     /  ALT  14     /  AlkPhos  176    01 Mar 2019 06:58    PT/INR - ( 2019 06:57 )   PT: 11.2 sec;   INR: 0.98 ratio    PTT - ( 2019 06:57 )  PTT:36.4 sec      LIVER FUNCTIONS - ( 01 Mar 2019 06:58 )  Alb: 3.3 g/dL / Pro: 5.8 g/dL / ALK PHOS: 176 U/L / ALT: 14 U/L / AST: 18 U/L / GGT: x               @ 08:22  Tacrolimus 12.7                Cyclosporine --      Cultures:     Culture - Urine (19 @ 18:41)    Specimen Source: .Urine Clean Catch (Midstream)    Culture Results:   <10,000 CFU/ml Normal Urogenital everton present    Culture - Blood (19 @ 18:30)    Specimen Source: .Blood Blood-Peripheral    Culture Results:   No growth at 5 days.      Radiology:     from: Xray Chest 1 View- PORTABLE-Urgent (19 @ 09:12)     IMPRESSION:    1.  The lungs are clear.      Meds:   Antimicrobials:   acyclovir   Oral Tab/Cap 400 milliGRAM(s) Oral every 8 hours  clotrimazole Lozenge 1 Lozenge Oral five times a day  fluconAZOLE   Tablet 400 milliGRAM(s) Oral daily  vancomycin    Solution 125 milliGRAM(s) Oral every 6 hours      Heme / Onc:   heparin  Infusion 464 Unit(s)/Hr IV Continuous <Continuous>      GI:  docusate sodium 100 milliGRAM(s) Oral three times a day PRN  pantoprazole    Tablet 40 milliGRAM(s) Oral before breakfast  polyethylene glycol 3350 17 Gram(s) Oral daily  senna 1 Tablet(s) Oral at bedtime PRN  sodium bicarbonate Mouth Rinse 10 milliLiter(s) Swish and Spit five times a day  ursodiol Capsule 300 milliGRAM(s) Oral two times a day with meals      Cardiovascular:   amLODIPine   Tablet 5 milliGRAM(s) Oral daily  losartan 100 milliGRAM(s) Oral daily      Immunologic:   immune   globulin 10% (GAMMAGARD) IVPB 20 Gram(s) IV Intermittent <User Schedule>  mycophenolate mofetil 1000 milliGRAM(s) Oral <User Schedule>  tacrolimus 2 milliGRAM(s) Oral every 12 hours      Other medications:   acetaminophen   Tablet .. 650 milliGRAM(s) Oral every 6 hours  acetaminophen   Tablet .. 650 milliGRAM(s) Oral <User Schedule>  aprepitant 80 milliGRAM(s) Oral daily  benzonatate 100 milliGRAM(s) Oral every 8 hours  Biotene Dry Mouth Oral Rinse 5 milliLiter(s) Swish and Spit five times a day  diphenhydrAMINE   Injectable 25 milliGRAM(s) IV Push every 3 weeks  folic acid 1 milliGRAM(s) Oral daily  levETIRAcetam 500 milliGRAM(s) Oral two times a day  lidocaine/prilocaine Cream 1 Application(s) Topical daily  loratadine 10 milliGRAM(s) Oral daily  magnesium oxide 400 milliGRAM(s) Oral three times a day with meals  multivitamin 1 Tablet(s) Oral daily  nystatin Powder 1 Application(s) Topical every 8 hours  ondansetron Injectable 8 milliGRAM(s) IV Push every 8 hours  sodium chloride 0.9%. 1000 milliLiter(s) IV Continuous <Continuous>      PRN:   acetaminophen   Tablet .. 650 milliGRAM(s) Oral every 6 hours PRN  acetaminophen   Tablet .. 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 diff - started vancomycin 125mg po q hours   Platelet refractory - platelets dispensed as 1/2 units to be given over 3 hours q 12 hours   increased back/bone pain with nausea- increased Dilaudid, added Ativan prn and Emend 80mg x 3 days  Engrafted. ANC 5100. stopped Zarxio. discontinued Cefepime.    1. Infectious Disease:   acyclovir   Oral Tab/Cap 400 milliGRAM(s) Oral every 8 hours  clotrimazole Lozenge 1 Lozenge Oral five times a day  fluconAZOLE   Tablet 400 milliGRAM(s) Oral daily  vancomycin    Solution 125 milliGRAM(s) Oral every 6 hours    2. VOD Prophylaxis: Actigall, Glutamine, Heparin (dosed at 100 units / kg / day)     3. GI Prophylaxis:    pantoprazole    Tablet 40 milliGRAM(s) Oral before breakfast    4. Mouth care - NS / NaHCO3 rinses, Mycelex, Biotene; Skin care     5. GVHD prophylaxis   mycophenolate mofetil 1000 milliGRAM(s) Oral <User Schedule>  tacrolimus 2 milliGRAM(s) Oral every 12 hours    6. Transfuse & replete electrolytes prn   potassium phosphate IVPB 15 milliMole(s) IV Intermittent once  magnesium oxide 400 milliGRAM(s) Oral three times a day with meals    7. IV hydration, daily weights, strict I&O, prn diuresis     8. PO intake as tolerated, nutrition follow up as needed, MVI, folic acid     9. Antiemetics, anti-diarrhea medications:   metoclopramide Injectable 10 milliGRAM(s) IV Push every 6 hours PRN  ondansetron Injectable 8 milliGRAM(s) IV Push every 8 hours    10. OOB as tolerated, physical therapy consult if needed     11. Monitor coags / fibrinogen 2x week, vitamin K as needed     12. Monitor closely for clinical changes, monitor for fevers     13. Emotional support provided, plan of care discussed and questions addressed     14. Patient education done regarding plan of care, restrictions and discharge planning     15. Continue regular social work input     I have written the above note for Dr. Logan who performed service with me in the room.   Janett Barrow NP-C ([phone])    I have seen and examined patient with NP, I agree with above note as scribed. 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    S: Patient seen and examined with HPC Transplant Team:   + bone pain much improved  + rare cough  no nausea today    Denies mouth / tongue / throat pain, nausea, vomiting, dyspnea, cough, abdominal pain      O: Vitals:   Vital Signs Last 24 Hrs  T(C): 36.3 (01 Mar 2019 05:21), Max: 37.2 (2019 14:45)  T(F): 97.3 (01 Mar 2019 05:21), Max: 99 (2019 14:45)  HR: 84 (01 Mar 2019 05:21) (84 - 96)  BP: 137/81 (01 Mar 2019 05:21) (132/92 - 144/77)  RR: 18 (01 Mar 2019 05:21) (18 - 20)  SpO2: 97% (01 Mar 2019 05:21) (96% - 100%)    Admit weight: 111.4 kg   Daily Weight in k.4 (2019 10:45)  Today's weight: 112 kg    Intake / Output:    @ 07:01  -  03-01 @ 07:00  --------------------------------------------------------  IN: 1682.1 mL / OUT: 1800 mL / NET: -117.9 mL        PE:   Oropharynx: no erythema or ulcers; resolving blood blister on the lower lip   Oral Mucositis: n/a                                                       stGstrstastdstest:st st1st CVS: S1S2, RRR  Lungs: CTA throughout bilaterally   Abdomen: soft, NT/ND, normoactive BS x 4Q  Extremities: no edema  Gastric Mucositis: n/a                                                 stGstrstastdstest:st st1st Intestinal Mucositis: n/a                                             stGstrstastdstest:st st1st Skin: no rash  TLC: C/D/I  Neuro: A&O x 3   Pain: chronic back pain/ bone pain 5/10      Labs:     CBC Full  -  ( 01 Mar 2019 06:58 )  WBC Count : 9.4 K/uL  Hemoglobin : 8.6 g/dL  Hematocrit : 25.4 %  Platelet Count - Automated : 25 K/uL  Mean Cell Volume : 102.0 fl  Mean Cell Hemoglobin : 34.4 pg  Mean Cell Hemoglobin Concentration : 33.7 gm/dL  Auto Neutrophil # : x  Auto Lymphocyte # : x  Auto Monocyte # : x  Auto Eosinophil # : x  Auto Basophil # : x  Auto Neutrophil % : x  Auto Lymphocyte % : x  Auto Monocyte % : x  Auto Eosinophil % : x  Auto Basophil % : x                          8.6    9.4   )-----------( 25       ( 01 Mar 2019 06:58 )             25.4       01 Mar 2019 06:58    142    |  105    |  11     ----------------------------<  87     3.9     |  26     |  0.87     Ca    10.4       01 Mar 2019 06:58  Phos  2.7       01 Mar 2019 06:58  Mg     1.8       01 Mar 2019 06:58    TPro  5.8    /  Alb  3.3    /  TBili  0.2    /  DBili  <0.1   /  AST  18     /  ALT  14     /  AlkPhos  176    01 Mar 2019 06:58    PT/INR - ( 2019 06:57 )   PT: 11.2 sec;   INR: 0.98 ratio    PTT - ( 2019 06:57 )  PTT:36.4 sec      LIVER FUNCTIONS - ( 01 Mar 2019 06:58 )  Alb: 3.3 g/dL / Pro: 5.8 g/dL / ALK PHOS: 176 U/L / ALT: 14 U/L / AST: 18 U/L / GGT: x              - @ 08:22  Tacrolimus 12.7                Cyclosporine --      Cultures:     Culture - Urine (19 @ 18:41)    Specimen Source: .Urine Clean Catch (Midstream)    Culture Results:   <10,000 CFU/ml Normal Urogenital everton present    Culture - Blood (19 @ 18:30)    Specimen Source: .Blood Blood-Peripheral    Culture Results:   No growth at 5 days.      Radiology:     from: Xray Chest 1 View- PORTABLE-Urgent (19 @ 09:12)     IMPRESSION:    1.  The lungs are clear.      Meds:   Antimicrobials:   acyclovir   Oral Tab/Cap 400 milliGRAM(s) Oral every 8 hours  clotrimazole Lozenge 1 Lozenge Oral five times a day  fluconAZOLE   Tablet 400 milliGRAM(s) Oral daily  vancomycin    Solution 125 milliGRAM(s) Oral every 6 hours      Heme / Onc:   heparin  Infusion 464 Unit(s)/Hr IV Continuous <Continuous>      GI:  docusate sodium 100 milliGRAM(s) Oral three times a day PRN  pantoprazole    Tablet 40 milliGRAM(s) Oral before breakfast  polyethylene glycol 3350 17 Gram(s) Oral daily  senna 1 Tablet(s) Oral at bedtime PRN  sodium bicarbonate Mouth Rinse 10 milliLiter(s) Swish and Spit five times a day  ursodiol Capsule 300 milliGRAM(s) Oral two times a day with meals      Cardiovascular:   amLODIPine   Tablet 5 milliGRAM(s) Oral daily  losartan 100 milliGRAM(s) Oral daily      Immunologic:   immune   globulin 10% (GAMMAGARD) IVPB 20 Gram(s) IV Intermittent <User Schedule>  mycophenolate mofetil 1000 milliGRAM(s) Oral <User Schedule>  tacrolimus 2 milliGRAM(s) Oral every 12 hours      Other medications:   acetaminophen   Tablet .. 650 milliGRAM(s) Oral every 6 hours  acetaminophen   Tablet .. 650 milliGRAM(s) Oral <User Schedule>  aprepitant 80 milliGRAM(s) Oral daily  benzonatate 100 milliGRAM(s) Oral every 8 hours  Biotene Dry Mouth Oral Rinse 5 milliLiter(s) Swish and Spit five times a day  diphenhydrAMINE   Injectable 25 milliGRAM(s) IV Push every 3 weeks  folic acid 1 milliGRAM(s) Oral daily  levETIRAcetam 500 milliGRAM(s) Oral two times a day  lidocaine/prilocaine Cream 1 Application(s) Topical daily  loratadine 10 milliGRAM(s) Oral daily  magnesium oxide 400 milliGRAM(s) Oral three times a day with meals  multivitamin 1 Tablet(s) Oral daily  nystatin Powder 1 Application(s) Topical every 8 hours  ondansetron Injectable 8 milliGRAM(s) IV Push every 8 hours  sodium chloride 0.9%. 1000 milliLiter(s) IV Continuous <Continuous>      PRN:   acetaminophen   Tablet .. 650 milliGRAM(s) Oral every 6 hours PRN  acetaminophen   Tablet .. 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 diff - started vancomycin 125mg po q hours   Platelet refractory - platelets dispensed as 1/2 units to be given over 3 hours q 12 hours   increased back/bone pain with nausea- increased Dilaudid, added Ativan prn and Emend 80mg x 3 days  Engrafted. ANC 5100. stopped Zarxio. discontinued Cefepime.    1. Infectious Disease:   acyclovir   Oral Tab/Cap 400 milliGRAM(s) Oral every 8 hours  clotrimazole Lozenge 1 Lozenge Oral five times a day  fluconAZOLE   Tablet 400 milliGRAM(s) Oral daily  vancomycin    Solution 125 milliGRAM(s) Oral every 6 hours    2. VOD Prophylaxis: Actigall, Glutamine, Heparin (dosed at 100 units / kg / day)     3. GI Prophylaxis:    pantoprazole    Tablet 40 milliGRAM(s) Oral before breakfast    4. Mouth care - NS / NaHCO3 rinses, Mycelex, Biotene; Skin care     5. GVHD prophylaxis   mycophenolate mofetil 1000 milliGRAM(s) Oral <User Schedule>  tacrolimus 2 milliGRAM(s) Oral every 12 hours    6. Transfuse & replete electrolytes prn   potassium phosphate IVPB 15 milliMole(s) IV Intermittent once  magnesium oxide 400 milliGRAM(s) Oral three times a day with meals    7. IV hydration, daily weights, strict I&O, prn diuresis     8. PO intake as tolerated, nutrition follow up as needed, MVI, folic acid     9. Antiemetics, anti-diarrhea medications:   metoclopramide Injectable 10 milliGRAM(s) IV Push every 6 hours PRN  ondansetron Injectable 8 milliGRAM(s) IV Push every 8 hours    10. OOB as tolerated, physical therapy consult if needed     11. Monitor coags / fibrinogen 2x week, vitamin K as needed     12. Monitor closely for clinical changes, monitor for fevers     13. Emotional support provided, plan of care discussed and questions addressed     14. Patient education done regarding plan of care, restrictions and discharge planning     15. Continue regular social work input     I have written the above note for Dr. Logan who performed service with me in the room.   Janett Barrow NP-C ([phone])    I have seen and examined patient with NP, I agree with above note as scribed. 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    S: Patient seen and examined with HPC Transplant Team:   + bone pain much improved  + rare cough  no nausea today    Denies mouth / tongue / throat pain, nausea, vomiting, dyspnea, cough, abdominal pain      O: Vitals:   Vital Signs Last 24 Hrs  T(C): 36.3 (01 Mar 2019 05:21), Max: 37.2 (2019 14:45)  T(F): 97.3 (01 Mar 2019 05:21), Max: 99 (2019 14:45)  HR: 84 (01 Mar 2019 05:21) (84 - 96)  BP: 137/81 (01 Mar 2019 05:21) (132/92 - 144/77)  RR: 18 (01 Mar 2019 05:21) (18 - 20)  SpO2: 97% (01 Mar 2019 05:21) (96% - 100%)    Admit weight: 111.4 kg   Daily Weight in k.4 (2019 10:45)  Today's weight: 112 kg    Intake / Output:    @ 07:01  -  03-01 @ 07:00  --------------------------------------------------------  IN: 1682.1 mL / OUT: 1800 mL / NET: -117.9 mL        PE:   Oropharynx: no erythema or ulcers; resolving blood blister on the lower lip   Oral Mucositis: n/a                                                       stGstrstastdstest:st st1st CVS: S1S2, RRR  Lungs: CTA throughout bilaterally   Abdomen: soft, NT/ND, normoactive BS x 4Q  Extremities: no edema  Gastric Mucositis: n/a                                                 stGstrstastdstest:st st1st Intestinal Mucositis: n/a                                             stGstrstastdstest:st st1st Skin: no rash  TLC: C/D/I  Neuro: A&O x 3   Pain: chronic back pain/ bone pain 5/10      Labs:     CBC Full  -  ( 01 Mar 2019 06:58 )  WBC Count : 9.4 K/uL  Hemoglobin : 8.6 g/dL  Hematocrit : 25.4 %  Platelet Count - Automated : 25 K/uL  Mean Cell Volume : 102.0 fl  Mean Cell Hemoglobin : 34.4 pg  Mean Cell Hemoglobin Concentration : 33.7 gm/dL  Auto Neutrophil # : x  Auto Lymphocyte # : x  Auto Monocyte # : x  Auto Eosinophil # : x  Auto Basophil # : x  Auto Neutrophil % : x  Auto Lymphocyte % : x  Auto Monocyte % : x  Auto Eosinophil % : x  Auto Basophil % : x                          8.6    9.4   )-----------( 25       ( 01 Mar 2019 06:58 )             25.4       01 Mar 2019 06:58    142    |  105    |  11     ----------------------------<  87     3.9     |  26     |  0.87     Ca    10.4       01 Mar 2019 06:58  Phos  2.7       01 Mar 2019 06:58  Mg     1.8       01 Mar 2019 06:58    TPro  5.8    /  Alb  3.3    /  TBili  0.2    /  DBili  <0.1   /  AST  18     /  ALT  14     /  AlkPhos  176    01 Mar 2019 06:58    PT/INR - ( 2019 06:57 )   PT: 11.2 sec;   INR: 0.98 ratio    PTT - ( 2019 06:57 )  PTT:36.4 sec      LIVER FUNCTIONS - ( 01 Mar 2019 06:58 )  Alb: 3.3 g/dL / Pro: 5.8 g/dL / ALK PHOS: 176 U/L / ALT: 14 U/L / AST: 18 U/L / GGT: x              - @ 08:22  Tacrolimus 12.7                Cyclosporine --      Cultures:     Culture - Urine (19 @ 18:41)    Specimen Source: .Urine Clean Catch (Midstream)    Culture Results:   <10,000 CFU/ml Normal Urogenital everton present    Culture - Blood (19 @ 18:30)    Specimen Source: .Blood Blood-Peripheral    Culture Results:   No growth at 5 days.      Radiology:     from: Xray Chest 1 View- PORTABLE-Urgent (19 @ 09:12)     IMPRESSION:    1.  The lungs are clear.      Meds:   Antimicrobials:   acyclovir   Oral Tab/Cap 400 milliGRAM(s) Oral every 8 hours  clotrimazole Lozenge 1 Lozenge Oral five times a day  fluconAZOLE   Tablet 400 milliGRAM(s) Oral daily  vancomycin    Solution 125 milliGRAM(s) Oral every 6 hours      Heme / Onc:   heparin  Infusion 464 Unit(s)/Hr IV Continuous <Continuous>      GI:  docusate sodium 100 milliGRAM(s) Oral three times a day PRN  pantoprazole    Tablet 40 milliGRAM(s) Oral before breakfast  polyethylene glycol 3350 17 Gram(s) Oral daily  senna 1 Tablet(s) Oral at bedtime PRN  sodium bicarbonate Mouth Rinse 10 milliLiter(s) Swish and Spit five times a day  ursodiol Capsule 300 milliGRAM(s) Oral two times a day with meals      Cardiovascular:   amLODIPine   Tablet 5 milliGRAM(s) Oral daily  losartan 100 milliGRAM(s) Oral daily      Immunologic:   immune   globulin 10% (GAMMAGARD) IVPB 20 Gram(s) IV Intermittent <User Schedule>  mycophenolate mofetil 1000 milliGRAM(s) Oral <User Schedule>  tacrolimus 2 milliGRAM(s) Oral every 12 hours      Other medications:   acetaminophen   Tablet .. 650 milliGRAM(s) Oral every 6 hours  acetaminophen   Tablet .. 650 milliGRAM(s) Oral <User Schedule>  aprepitant 80 milliGRAM(s) Oral daily  benzonatate 100 milliGRAM(s) Oral every 8 hours  Biotene Dry Mouth Oral Rinse 5 milliLiter(s) Swish and Spit five times a day  diphenhydrAMINE   Injectable 25 milliGRAM(s) IV Push every 3 weeks  folic acid 1 milliGRAM(s) Oral daily  levETIRAcetam 500 milliGRAM(s) Oral two times a day  lidocaine/prilocaine Cream 1 Application(s) Topical daily  loratadine 10 milliGRAM(s) Oral daily  magnesium oxide 400 milliGRAM(s) Oral three times a day with meals  multivitamin 1 Tablet(s) Oral daily  nystatin Powder 1 Application(s) Topical every 8 hours  ondansetron Injectable 8 milliGRAM(s) IV Push every 8 hours  sodium chloride 0.9%. 1000 milliLiter(s) IV Continuous <Continuous>      PRN:   acetaminophen   Tablet .. 650 milliGRAM(s) Oral every 6 hours PRN  acetaminophen   Tablet .. 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 diff - started vancomycin 125mg po q hours   Platelet refractory - platelets dispensed as 1/2 units to be given over 3 hours q 12 hours   increased back/bone pain with nausea- increased Dilaudid, added Ativan prn and Emend 80mg x 3 days  Engrafted.. stopped Zarxio. discontinued Cefepime.   level 12.7, reduced Tacro to 1.5 BID, check daily levels,  Discharge planning for 3/4    1. Infectious Disease:   acyclovir   Oral Tab/Cap 400 milliGRAM(s) Oral every 8 hours  clotrimazole Lozenge 1 Lozenge Oral five times a day  fluconAZOLE   Tablet 400 milliGRAM(s) Oral daily  vancomycin    Solution 125 milliGRAM(s) Oral every 6 hours    2. VOD Prophylaxis: Actigall, Glutamine, Heparin (dosed at 100 units / kg / day)     3. GI Prophylaxis:    pantoprazole    Tablet 40 milliGRAM(s) Oral before breakfast    4. Mouth care - NS / NaHCO3 rinses, Mycelex, Biotene; Skin care     5. GVHD prophylaxis   mycophenolate mofetil 1000 milliGRAM(s) Oral <User Schedule>  tacrolimus 2 milliGRAM(s) Oral every 12 hours    6. Transfuse & replete electrolytes prn   potassium phosphate IVPB 15 milliMole(s) IV Intermittent once  magnesium oxide 400 milliGRAM(s) Oral three times a day with meals    7. IV hydration, daily weights, strict I&O, prn diuresis     8. PO intake as tolerated, nutrition follow up as needed, MVI, folic acid     9. Antiemetics, anti-diarrhea medications:   metoclopramide Injectable 10 milliGRAM(s) IV Push every 6 hours PRN  ondansetron Injectable 8 milliGRAM(s) IV Push every 8 hours    10. OOB as tolerated, physical therapy consult if needed     11. Monitor coags / fibrinogen 2x week, vitamin K as needed     12. Monitor closely for clinical changes, monitor for fevers     13. Emotional support provided, plan of care discussed and questions addressed     14. Patient education done regarding plan of care, restrictions and discharge planning     15. Continue regular social work input     I have written the above note for Dr. Logan who performed service with me in the room.   Janett Barrow NP-C ([phone])    I have seen and examined patient with NP, I agree with above note as scribed. 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    S: Patient seen and examined with HPC Transplant Team:   + bone pain much improved  + rare cough  no nausea today    Denies mouth / tongue / throat pain, nausea, vomiting, dyspnea, cough, abdominal pain      O: Vitals:   Vital Signs Last 24 Hrs  T(C): 36.3 (01 Mar 2019 05:21), Max: 37.2 (2019 14:45)  T(F): 97.3 (01 Mar 2019 05:21), Max: 99 (2019 14:45)  HR: 84 (01 Mar 2019 05:21) (84 - 96)  BP: 137/81 (01 Mar 2019 05:21) (132/92 - 144/77)  RR: 18 (01 Mar 2019 05:21) (18 - 20)  SpO2: 97% (01 Mar 2019 05:21) (96% - 100%)    Admit weight: 111.4 kg   Daily Weight in k.4 (2019 10:45)  Today's weight: 112 kg    Intake / Output:    @ 07:01  -  03-01 @ 07:00  --------------------------------------------------------  IN: 1682.1 mL / OUT: 1800 mL / NET: -117.9 mL        PE:   Oropharynx: no erythema or ulcers; resolving blood blister on the lower lip   Oral Mucositis: n/a                                                       stGstrstastdstest:st st1st CVS: S1S2, RRR  Lungs: CTA throughout bilaterally   Abdomen: soft, NT/ND, normoactive BS x 4Q  Extremities: no edema  Gastric Mucositis: n/a                                                 stGstrstastdstest:st st1st Intestinal Mucositis: n/a                                             stGstrstastdstest:st st1st Skin: no rash  TLC: C/D/I  Neuro: A&O x 3   Pain: chronic back pain/ bone pain 5/10      Labs:     CBC Full  -  ( 01 Mar 2019 06:58 )  WBC Count : 9.4 K/uL  Hemoglobin : 8.6 g/dL  Hematocrit : 25.4 %  Platelet Count - Automated : 25 K/uL  Mean Cell Volume : 102.0 fl  Mean Cell Hemoglobin : 34.4 pg  Mean Cell Hemoglobin Concentration : 33.7 gm/dL  Auto Neutrophil # : x  Auto Lymphocyte # : x  Auto Monocyte # : x  Auto Eosinophil # : x  Auto Basophil # : x  Auto Neutrophil % : x  Auto Lymphocyte % : x  Auto Monocyte % : x  Auto Eosinophil % : x  Auto Basophil % : x                          8.6    9.4   )-----------( 25       ( 01 Mar 2019 06:58 )             25.4       01 Mar 2019 06:58    142    |  105    |  11     ----------------------------<  87     3.9     |  26     |  0.87     Ca    10.4       01 Mar 2019 06:58  Phos  2.7       01 Mar 2019 06:58  Mg     1.8       01 Mar 2019 06:58    TPro  5.8    /  Alb  3.3    /  TBili  0.2    /  DBili  <0.1   /  AST  18     /  ALT  14     /  AlkPhos  176    01 Mar 2019 06:58    PT/INR - ( 2019 06:57 )   PT: 11.2 sec;   INR: 0.98 ratio    PTT - ( 2019 06:57 )  PTT:36.4 sec      LIVER FUNCTIONS - ( 01 Mar 2019 06:58 )  Alb: 3.3 g/dL / Pro: 5.8 g/dL / ALK PHOS: 176 U/L / ALT: 14 U/L / AST: 18 U/L / GGT: x              - @ 08:22  Tacrolimus 12.7                Cyclosporine --      Cultures:     Culture - Urine (19 @ 18:41)    Specimen Source: .Urine Clean Catch (Midstream)    Culture Results:   <10,000 CFU/ml Normal Urogenital everton present    Culture - Blood (19 @ 18:30)    Specimen Source: .Blood Blood-Peripheral    Culture Results:   No growth at 5 days.      Radiology:     from: Xray Chest 1 View- PORTABLE-Urgent (19 @ 09:12)     IMPRESSION:    1.  The lungs are clear.      Meds:   Antimicrobials:   acyclovir   Oral Tab/Cap 400 milliGRAM(s) Oral every 8 hours  clotrimazole Lozenge 1 Lozenge Oral five times a day  fluconAZOLE   Tablet 400 milliGRAM(s) Oral daily  vancomycin    Solution 125 milliGRAM(s) Oral every 6 hours      Heme / Onc:   heparin  Infusion 464 Unit(s)/Hr IV Continuous <Continuous>      GI:  docusate sodium 100 milliGRAM(s) Oral three times a day PRN  pantoprazole    Tablet 40 milliGRAM(s) Oral before breakfast  polyethylene glycol 3350 17 Gram(s) Oral daily  senna 1 Tablet(s) Oral at bedtime PRN  sodium bicarbonate Mouth Rinse 10 milliLiter(s) Swish and Spit five times a day  ursodiol Capsule 300 milliGRAM(s) Oral two times a day with meals      Cardiovascular:   amLODIPine   Tablet 5 milliGRAM(s) Oral daily  losartan 100 milliGRAM(s) Oral daily      Immunologic:   immune   globulin 10% (GAMMAGARD) IVPB 20 Gram(s) IV Intermittent <User Schedule>  mycophenolate mofetil 1000 milliGRAM(s) Oral <User Schedule>  tacrolimus 2 milliGRAM(s) Oral every 12 hours      Other medications:   acetaminophen   Tablet .. 650 milliGRAM(s) Oral every 6 hours  acetaminophen   Tablet .. 650 milliGRAM(s) Oral <User Schedule>  aprepitant 80 milliGRAM(s) Oral daily  benzonatate 100 milliGRAM(s) Oral every 8 hours  Biotene Dry Mouth Oral Rinse 5 milliLiter(s) Swish and Spit five times a day  diphenhydrAMINE   Injectable 25 milliGRAM(s) IV Push every 3 weeks  folic acid 1 milliGRAM(s) Oral daily  levETIRAcetam 500 milliGRAM(s) Oral two times a day  lidocaine/prilocaine Cream 1 Application(s) Topical daily  loratadine 10 milliGRAM(s) Oral daily  magnesium oxide 400 milliGRAM(s) Oral three times a day with meals  multivitamin 1 Tablet(s) Oral daily  nystatin Powder 1 Application(s) Topical every 8 hours  ondansetron Injectable 8 milliGRAM(s) IV Push every 8 hours  sodium chloride 0.9%. 1000 milliLiter(s) IV Continuous <Continuous>      PRN:   acetaminophen   Tablet .. 650 milliGRAM(s) Oral every 6 hours PRN  acetaminophen   Tablet .. 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 diff - started vancomycin 125mg po q hours   Platelet refractory - platelets dispensed as 1/2 units to be given over 3 hours q 12 hours   increased back/bone pain with nausea- increased Dilaudid, added Ativan prn and Emend 80mg x 3 days  Engrafted. ANC 7200 stopped Zarxio. discontinued Cefepime.   level 12.7, reduced Tacro to 1.5 BID, check daily levels,  Discharge planning for 3/4    1. Infectious Disease:   acyclovir   Oral Tab/Cap 400 milliGRAM(s) Oral every 8 hours  clotrimazole Lozenge 1 Lozenge Oral five times a day  fluconAZOLE   Tablet 400 milliGRAM(s) Oral daily  vancomycin    Solution 125 milliGRAM(s) Oral every 6 hours    2. VOD Prophylaxis: Actigall, Glutamine, Heparin (dosed at 100 units / kg / day)     3. GI Prophylaxis:    pantoprazole    Tablet 40 milliGRAM(s) Oral before breakfast    4. Mouth care - NS / NaHCO3 rinses, Mycelex, Biotene; Skin care     5. GVHD prophylaxis   mycophenolate mofetil 1000 milliGRAM(s) Oral <User Schedule>  tacrolimus 2 milliGRAM(s) Oral every 12 hours    6. Transfuse & replete electrolytes prn   potassium phosphate IVPB 15 milliMole(s) IV Intermittent once  magnesium oxide 400 milliGRAM(s) Oral three times a day with meals    7. IV hydration, daily weights, strict I&O, prn diuresis     8. PO intake as tolerated, nutrition follow up as needed, MVI, folic acid     9. Antiemetics, anti-diarrhea medications:   metoclopramide Injectable 10 milliGRAM(s) IV Push every 6 hours PRN  ondansetron Injectable 8 milliGRAM(s) IV Push every 8 hours    10. OOB as tolerated, physical therapy consult if needed     11. Monitor coags / fibrinogen 2x week, vitamin K as needed     12. Monitor closely for clinical changes, monitor for fevers     13. Emotional support provided, plan of care discussed and questions addressed     14. Patient education done regarding plan of care, restrictions and discharge planning     15. Continue regular social work input     I have written the above note for Dr. Logan who performed service with me in the room.   Janett Barrow NP-C ([phone])    I have seen and examined patient with NP, I agree with above note as scribed. 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    S: Patient seen and examined with HPC Transplant Team:   + bone pain much improved  + rare cough  no nausea today    Denies mouth / tongue / throat pain, nausea, vomiting, dyspnea, cough, abdominal pain      O: Vitals:   Vital Signs Last 24 Hrs  T(C): 36.3 (01 Mar 2019 05:21), Max: 37.2 (2019 14:45)  T(F): 97.3 (01 Mar 2019 05:21), Max: 99 (2019 14:45)  HR: 84 (01 Mar 2019 05:21) (84 - 96)  BP: 137/81 (01 Mar 2019 05:21) (132/92 - 144/77)  RR: 18 (01 Mar 2019 05:21) (18 - 20)  SpO2: 97% (01 Mar 2019 05:21) (96% - 100%)    Admit weight: 111.4 kg   Daily Weight in k.4 (2019 10:45)  Today's weight: 112 kg    Intake / Output:    @ 07:01  -  03-01 @ 07:00  --------------------------------------------------------  IN: 1682.1 mL / OUT: 1800 mL / NET: -117.9 mL        PE:   Oropharynx: no erythema or ulcers; resolving blood blister on the lower lip   Oral Mucositis: n/a                                                       stGstrstastdstest:st st1st CVS: S1S2, RRR  Lungs: CTA throughout bilaterally   Abdomen: soft, NT/ND, normoactive BS x 4Q  Extremities: no edema  Gastric Mucositis: n/a                                                 stGstrstastdstest:st st1st Intestinal Mucositis: n/a                                             stGstrstastdstest:st st1st Skin: no rash  TLC: C/D/I  Neuro: A&O x 3   Pain: chronic back pain/ bone pain 5/10      Labs:     CBC Full  -  ( 01 Mar 2019 06:58 )  WBC Count : 9.4 K/uL  Hemoglobin : 8.6 g/dL  Hematocrit : 25.4 %  Platelet Count - Automated : 25 K/uL  Mean Cell Volume : 102.0 fl  Mean Cell Hemoglobin : 34.4 pg  Mean Cell Hemoglobin Concentration : 33.7 gm/dL  Auto Neutrophil # : x  Auto Lymphocyte # : x  Auto Monocyte # : x  Auto Eosinophil # : x  Auto Basophil # : x  Auto Neutrophil % : x  Auto Lymphocyte % : x  Auto Monocyte % : x  Auto Eosinophil % : x  Auto Basophil % : x                          8.6    9.4   )-----------( 25       ( 01 Mar 2019 06:58 )             25.4       01 Mar 2019 06:58    142    |  105    |  11     ----------------------------<  87     3.9     |  26     |  0.87     Ca    10.4       01 Mar 2019 06:58  Phos  2.7       01 Mar 2019 06:58  Mg     1.8       01 Mar 2019 06:58    TPro  5.8    /  Alb  3.3    /  TBili  0.2    /  DBili  <0.1   /  AST  18     /  ALT  14     /  AlkPhos  176    01 Mar 2019 06:58    PT/INR - ( 2019 06:57 )   PT: 11.2 sec;   INR: 0.98 ratio    PTT - ( 2019 06:57 )  PTT:36.4 sec      LIVER FUNCTIONS - ( 01 Mar 2019 06:58 )  Alb: 3.3 g/dL / Pro: 5.8 g/dL / ALK PHOS: 176 U/L / ALT: 14 U/L / AST: 18 U/L / GGT: x              - @ 08:22  Tacrolimus 12.7                Cyclosporine --      Cultures:     Culture - Urine (19 @ 18:41)    Specimen Source: .Urine Clean Catch (Midstream)    Culture Results:   <10,000 CFU/ml Normal Urogenital everton present    Culture - Blood (19 @ 18:30)    Specimen Source: .Blood Blood-Peripheral    Culture Results:   No growth at 5 days.      Radiology:     from: Xray Chest 1 View- PORTABLE-Urgent (19 @ 09:12)     IMPRESSION:    1.  The lungs are clear.      Meds:   Antimicrobials:   acyclovir   Oral Tab/Cap 400 milliGRAM(s) Oral every 8 hours  clotrimazole Lozenge 1 Lozenge Oral five times a day  fluconAZOLE   Tablet 400 milliGRAM(s) Oral daily  vancomycin    Solution 125 milliGRAM(s) Oral every 6 hours      Heme / Onc:   heparin  Infusion 464 Unit(s)/Hr IV Continuous <Continuous>      GI:  docusate sodium 100 milliGRAM(s) Oral three times a day PRN  pantoprazole    Tablet 40 milliGRAM(s) Oral before breakfast  polyethylene glycol 3350 17 Gram(s) Oral daily  senna 1 Tablet(s) Oral at bedtime PRN  sodium bicarbonate Mouth Rinse 10 milliLiter(s) Swish and Spit five times a day  ursodiol Capsule 300 milliGRAM(s) Oral two times a day with meals      Cardiovascular:   amLODIPine   Tablet 5 milliGRAM(s) Oral daily  losartan 100 milliGRAM(s) Oral daily      Immunologic:   immune   globulin 10% (GAMMAGARD) IVPB 20 Gram(s) IV Intermittent <User Schedule>  mycophenolate mofetil 1000 milliGRAM(s) Oral <User Schedule>  tacrolimus 1.5 milliGRAM(s) Oral every 12 hours      Other medications:   acetaminophen   Tablet .. 650 milliGRAM(s) Oral every 6 hours  acetaminophen   Tablet .. 650 milliGRAM(s) Oral <User Schedule>  aprepitant 80 milliGRAM(s) Oral daily  benzonatate 100 milliGRAM(s) Oral every 8 hours  Biotene Dry Mouth Oral Rinse 5 milliLiter(s) Swish and Spit five times a day  diphenhydrAMINE   Injectable 25 milliGRAM(s) IV Push every 3 weeks  folic acid 1 milliGRAM(s) Oral daily  levETIRAcetam 500 milliGRAM(s) Oral two times a day  lidocaine/prilocaine Cream 1 Application(s) Topical daily  loratadine 10 milliGRAM(s) Oral daily  magnesium oxide 400 milliGRAM(s) Oral three times a day with meals  multivitamin 1 Tablet(s) Oral daily  nystatin Powder 1 Application(s) Topical every 8 hours  ondansetron Injectable 8 milliGRAM(s) IV Push every 8 hours  sodium chloride 0.9%. 1000 milliLiter(s) IV Continuous <Continuous>      PRN:   acetaminophen   Tablet .. 650 milliGRAM(s) Oral every 6 hours PRN  acetaminophen   Tablet .. 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 diff - started vancomycin 125mg po q hours   Platelet refractory - platelets dispensed as 1/2 units to be given over 3 hours q 12 hours   increased back/bone pain with nausea- increased Dilaudid, added Ativan prn and Emend 80mg x 3 days  Engrafted. ANC 7200 stopped Zarxio. discontinued Cefepime.   level 12.7, reduced Tacro to 1.5 BID, check daily levels,  Discharge planning for 3/4    1. Infectious Disease:   acyclovir   Oral Tab/Cap 400 milliGRAM(s) Oral every 8 hours  clotrimazole Lozenge 1 Lozenge Oral five times a day  fluconAZOLE   Tablet 400 milliGRAM(s) Oral daily  vancomycin    Solution 125 milliGRAM(s) Oral every 6 hours    2. VOD Prophylaxis: Actigall, Glutamine, Heparin (dosed at 100 units / kg / day)     3. GI Prophylaxis:    pantoprazole    Tablet 40 milliGRAM(s) Oral before breakfast    4. Mouth care - NS / NaHCO3 rinses, Mycelex, Biotene; Skin care     5. GVHD prophylaxis   mycophenolate mofetil 1000 milliGRAM(s) Oral <User Schedule>  tacrolimus 1.5 milliGRAM(s) Oral every 12 hours    6. Transfuse & replete electrolytes prn   potassium phosphate IVPB 15 milliMole(s) IV Intermittent once  magnesium oxide 400 milliGRAM(s) Oral three times a day with meals    7. IV hydration, daily weights, strict I&O, prn diuresis     8. PO intake as tolerated, nutrition follow up as needed, MVI, folic acid     9. Antiemetics, anti-diarrhea medications:   metoclopramide Injectable 10 milliGRAM(s) IV Push every 6 hours PRN  ondansetron Injectable 8 milliGRAM(s) IV Push every 8 hours    10. OOB as tolerated, physical therapy consult if needed     11. Monitor coags / fibrinogen 2x week, vitamin K as needed     12. Monitor closely for clinical changes, monitor for fevers     13. Emotional support provided, plan of care discussed and questions addressed     14. Patient education done regarding plan of care, restrictions and discharge planning     15. Continue regular social work input     I have written the above note for Dr. Logan who performed service with me in the room.   Janett Barrow NP-C ([phone])    I have seen and examined patient with NP, I agree with above note as scribed. 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    S: Patient seen and examined with HPC Transplant Team:   + bone pain much improved  + rare cough  no nausea today    Denies mouth / tongue / throat pain, nausea, vomiting, dyspnea, abdominal pain      O: Vitals:   Vital Signs Last 24 Hrs  T(C): 36.3 (01 Mar 2019 05:21), Max: 37.2 (2019 14:45)  T(F): 97.3 (01 Mar 2019 05:21), Max: 99 (2019 14:45)  HR: 84 (01 Mar 2019 05:21) (84 - 96)  BP: 137/81 (01 Mar 2019 05:21) (132/92 - 144/77)  RR: 18 (01 Mar 2019 05:21) (18 - 20)  SpO2: 97% (01 Mar 2019 05:21) (96% - 100%)    Admit weight: 111.4 kg   Daily Weight in k.4 (2019 10:45)  Today's weight: 112 kg    Intake / Output:    @ 07:01  -  03-01 @ 07:00  --------------------------------------------------------  IN: 1682.1 mL / OUT: 1800 mL / NET: -117.9 mL        PE:   Oropharynx: no erythema or ulcers; resolving blood blister on the lower lip   Oral Mucositis: n/a                                                       stGstrstastdstest:st st1st CVS: S1S2, RRR  Lungs: CTA throughout bilaterally   Abdomen: soft, NT/ND, normoactive BS x 4Q  Extremities: no edema  Gastric Mucositis: n/a                                                 stGstrstastdstest:st st1st Intestinal Mucositis: n/a                                             stGstrstastdstest:st st1st Skin: no rash  TLC: C/D/I  Neuro: A&O x 3   Pain: chronic back pain/ bone pain 5/10      Labs:     CBC Full  -  ( 01 Mar 2019 06:58 )  WBC Count : 9.4 K/uL  Hemoglobin : 8.6 g/dL  Hematocrit : 25.4 %  Platelet Count - Automated : 25 K/uL  Mean Cell Volume : 102.0 fl  Mean Cell Hemoglobin : 34.4 pg  Mean Cell Hemoglobin Concentration : 33.7 gm/dL  Auto Neutrophil # : x  Auto Lymphocyte # : x  Auto Monocyte # : x  Auto Eosinophil # : x  Auto Basophil # : x  Auto Neutrophil % : x  Auto Lymphocyte % : x  Auto Monocyte % : x  Auto Eosinophil % : x  Auto Basophil % : x                          8.6    9.4   )-----------( 25       ( 01 Mar 2019 06:58 )             25.4       01 Mar 2019 06:58    142    |  105    |  11     ----------------------------<  87     3.9     |  26     |  0.87     Ca    10.4       01 Mar 2019 06:58  Phos  2.7       01 Mar 2019 06:58  Mg     1.8       01 Mar 2019 06:58    TPro  5.8    /  Alb  3.3    /  TBili  0.2    /  DBili  <0.1   /  AST  18     /  ALT  14     /  AlkPhos  176    01 Mar 2019 06:58    PT/INR - ( 2019 06:57 )   PT: 11.2 sec;   INR: 0.98 ratio    PTT - ( 2019 06:57 )  PTT:36.4 sec      LIVER FUNCTIONS - ( 01 Mar 2019 06:58 )  Alb: 3.3 g/dL / Pro: 5.8 g/dL / ALK PHOS: 176 U/L / ALT: 14 U/L / AST: 18 U/L / GGT: x              - @ 08:22  Tacrolimus 12.7                Cyclosporine --      Cultures:     Culture - Urine (19 @ 18:41)    Specimen Source: .Urine Clean Catch (Midstream)    Culture Results:   <10,000 CFU/ml Normal Urogenital everton present    Culture - Blood (19 @ 18:30)    Specimen Source: .Blood Blood-Peripheral    Culture Results:   No growth at 5 days.      Radiology:     from: Xray Chest 1 View- PORTABLE-Urgent (19 @ 09:12)     IMPRESSION:    1.  The lungs are clear.      Meds:   Antimicrobials:   acyclovir   Oral Tab/Cap 400 milliGRAM(s) Oral every 8 hours  clotrimazole Lozenge 1 Lozenge Oral five times a day  fluconAZOLE   Tablet 400 milliGRAM(s) Oral daily  vancomycin    Solution 125 milliGRAM(s) Oral every 6 hours      Heme / Onc:   heparin  Infusion 464 Unit(s)/Hr IV Continuous <Continuous>      GI:  docusate sodium 100 milliGRAM(s) Oral three times a day PRN  pantoprazole    Tablet 40 milliGRAM(s) Oral before breakfast  polyethylene glycol 3350 17 Gram(s) Oral daily  senna 1 Tablet(s) Oral at bedtime PRN  sodium bicarbonate Mouth Rinse 10 milliLiter(s) Swish and Spit five times a day  ursodiol Capsule 300 milliGRAM(s) Oral two times a day with meals      Cardiovascular:   amLODIPine   Tablet 5 milliGRAM(s) Oral daily  losartan 100 milliGRAM(s) Oral daily      Immunologic:   immune   globulin 10% (GAMMAGARD) IVPB 20 Gram(s) IV Intermittent <User Schedule>  mycophenolate mofetil 1000 milliGRAM(s) Oral <User Schedule>  tacrolimus 1.5 milliGRAM(s) Oral every 12 hours      Other medications:   acetaminophen   Tablet .. 650 milliGRAM(s) Oral every 6 hours  acetaminophen   Tablet .. 650 milliGRAM(s) Oral <User Schedule>  aprepitant 80 milliGRAM(s) Oral daily  benzonatate 100 milliGRAM(s) Oral every 8 hours  Biotene Dry Mouth Oral Rinse 5 milliLiter(s) Swish and Spit five times a day  diphenhydrAMINE   Injectable 25 milliGRAM(s) IV Push every 3 weeks  folic acid 1 milliGRAM(s) Oral daily  levETIRAcetam 500 milliGRAM(s) Oral two times a day  lidocaine/prilocaine Cream 1 Application(s) Topical daily  loratadine 10 milliGRAM(s) Oral daily  magnesium oxide 400 milliGRAM(s) Oral three times a day with meals  multivitamin 1 Tablet(s) Oral daily  nystatin Powder 1 Application(s) Topical every 8 hours  ondansetron Injectable 8 milliGRAM(s) IV Push every 8 hours  sodium chloride 0.9%. 1000 milliLiter(s) IV Continuous <Continuous>      PRN:   acetaminophen   Tablet .. 650 milliGRAM(s) Oral every 6 hours PRN  acetaminophen   Tablet .. 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 diff - started vancomycin 125mg po q hours   Platelet refractory - platelets dispensed as 1/2 units to be given over 3 hours q 12 hours   increased back/bone pain with nausea- increased Dilaudid, added Ativan prn and Emend 80mg x 3 days  Engrafted. ANC 7200 stopped Zarxio. discontinued Cefepime.   level 12.7, reduced Tacro to 1.5 BID, check daily levels  Discharge planning for 3/4    1. Infectious Disease:   acyclovir   Oral Tab/Cap 400 milliGRAM(s) Oral every 8 hours  clotrimazole Lozenge 1 Lozenge Oral five times a day  fluconAZOLE   Tablet 400 milliGRAM(s) Oral daily  vancomycin    Solution 125 milliGRAM(s) Oral every 6 hours    2. VOD Prophylaxis: Actigall, Glutamine, Heparin (dosed at 100 units / kg / day)     3. GI Prophylaxis:    pantoprazole    Tablet 40 milliGRAM(s) Oral before breakfast    4. Mouth care - NS / NaHCO3 rinses, Mycelex, Biotene; Skin care     5. GVHD prophylaxis   mycophenolate mofetil 1000 milliGRAM(s) Oral <User Schedule>  tacrolimus 1.5 milliGRAM(s) Oral every 12 hours    6. Transfuse & replete electrolytes prn   potassium phosphate IVPB 15 milliMole(s) IV Intermittent once  magnesium oxide 400 milliGRAM(s) Oral three times a day with meals    7. IV hydration, daily weights, strict I&O, prn diuresis     8. PO intake as tolerated, nutrition follow up as needed, MVI, folic acid     9. Antiemetics, anti-diarrhea medications:   metoclopramide Injectable 10 milliGRAM(s) IV Push every 6 hours PRN  ondansetron Injectable 8 milliGRAM(s) IV Push every 8 hours    10. OOB as tolerated, physical therapy consult if needed     11. Monitor coags / fibrinogen 2x week, vitamin K as needed     12. Monitor closely for clinical changes, monitor for fevers     13. Emotional support provided, plan of care discussed and questions addressed     14. Patient education done regarding plan of care, restrictions and discharge planning     15. Continue regular social work input     I have written the above note for Dr. Logan who performed service with me in the room.   Janett Barrow NP-C ([phone])    I have seen and examined patient with NP, I agree with above note as scribed.

## 2019-03-01 NOTE — PROGRESS NOTE ADULT - ATTENDING COMMENTS
58 year old female with PMH [place] chromosome positive ALL diagnosed in 2016 s/p R HyperCVAD X 4 cycles, IT MTX X 2, s/p autologous stem cell transplantation (12/16/16),  who presented in October 2018 with subdural hemorrhage and relapsed disease confirmed by peripheral blood flow cytometry treated with Inotuzumab X 2 cycles.  Bone marrow biopsy on 1/23/19 showed remission with FISH and PCR for BCR-ABL translocation negative on the BM specimen but peripheral blood testing on 1/31 showed .004% BCR-ABL transcript with negative FISH suggesting MRD positivity.  Patient was on Ponatinib, DC 1/31/19. LFT's improved off drug.  Now in CR2    Patient was admitted for haploidentical stem cell transplantation with fludarabine/cytoxan/TBI conditioning. She is s/p HPC transplant, day + 15  Completed high-dose Cytoxan on days +3, +4(GVHD prophylaxis); then begin Tacrolimus, Cellcept on day +5.  Begin Zarxio on day +5  + WBC engraftment- discontinue Zarxio. Monitor daily CBC with diff. Monitor for aGVHD.  Neutropenic fevers resolved, s/p haplo storm - on Cefepime, culture from 2/14, 2/16 negative.  CXR negative.  On Acyclovir, Fluconazole prophylaxis.  Neutropenia now resolved with engraftment and growth factor support- D/C Cefepime, Zarxio  C. Diff diarrhea- continue oral Vancomycin, diarrhea improving  Monitor I/O's/weights- lasix as needed  VOD prophylaxis as per protocol.  Mouth care, skin care  Antiemetics as needed    History of SDH, continue Keppra. CT scan baseline 2/7/19 negative for SDH.   History of HTN continue Losartan.  OOB/ambulate 58 year old female with PMH [place] chromosome positive ALL diagnosed in 2016 s/p R HyperCVAD X 4 cycles, IT MTX X 2, s/p autologous stem cell transplantation (12/16/16),  who presented in October 2018 with subdural hemorrhage and relapsed disease confirmed by peripheral blood flow cytometry treated with Inotuzumab X 2 cycles.  Bone marrow biopsy on 1/23/19 showed remission with FISH and PCR for BCR-ABL translocation negative on the BM specimen but peripheral blood testing on 1/31 showed .004% BCR-ABL transcript with negative FISH suggesting MRD positivity.  Patient was on Ponatinib, DC 1/31/19. LFT's improved off drug.  Now in CR2    Patient was admitted for haploidentical stem cell transplantation with fludarabine/cytoxan/TBI conditioning. She is s/p HPC transplant, day + 16  Completed high-dose Cytoxan on days +3, +4(GVHD prophylaxis); then begin Tacrolimus, Cellcept on day +5.  Begin Zarxio on day +5  + WBC engraftment- discontinue Zarxio. Monitor daily CBC with diff. Monitor for aGVHD.  Neutropenic fevers resolved, s/p haplo storm - on Cefepime, culture from 2/14, 2/16 negative.  CXR negative.  On Acyclovir, Fluconazole prophylaxis.  Neutropenia now resolved with engraftment and growth factor support- D/C Cefepime and Zarxio  C. Diff diarrhea- continue oral Vancomycin, diarrhea improving  Monitor I/O's/weights- lasix as needed  VOD prophylaxis as per protocol.  Mouth care, skin care  Antiemetics as needed    History of SDH, continue Keppra. CT scan baseline 2/7/19 negative for SDH.   History of HTN continue Losartan.  OOB/ambulate

## 2019-03-02 LAB
ALBUMIN SERPL ELPH-MCNC: 3.3 G/DL — SIGNIFICANT CHANGE UP (ref 3.3–5)
ALP SERPL-CCNC: 176 U/L — HIGH (ref 40–120)
ALT FLD-CCNC: 17 U/L — SIGNIFICANT CHANGE UP (ref 10–45)
ANION GAP SERPL CALC-SCNC: 9 MMOL/L — SIGNIFICANT CHANGE UP (ref 5–17)
AST SERPL-CCNC: 21 U/L — SIGNIFICANT CHANGE UP (ref 10–40)
BASOPHILS # BLD AUTO: 0 K/UL — SIGNIFICANT CHANGE UP (ref 0–0.2)
BILIRUB SERPL-MCNC: 0.1 MG/DL — LOW (ref 0.2–1.2)
BUN SERPL-MCNC: 10 MG/DL — SIGNIFICANT CHANGE UP (ref 7–23)
CALCIUM SERPL-MCNC: 10.4 MG/DL — SIGNIFICANT CHANGE UP (ref 8.4–10.5)
CHLORIDE SERPL-SCNC: 106 MMOL/L — SIGNIFICANT CHANGE UP (ref 96–108)
CO2 SERPL-SCNC: 27 MMOL/L — SIGNIFICANT CHANGE UP (ref 22–31)
CREAT SERPL-MCNC: 0.94 MG/DL — SIGNIFICANT CHANGE UP (ref 0.5–1.3)
EOSINOPHIL # BLD AUTO: 0 K/UL — SIGNIFICANT CHANGE UP (ref 0–0.5)
GLUCOSE SERPL-MCNC: 97 MG/DL — SIGNIFICANT CHANGE UP (ref 70–99)
HCT VFR BLD CALC: 23.4 % — LOW (ref 34.5–45)
HGB BLD-MCNC: 8.4 G/DL — LOW (ref 11.5–15.5)
LDH SERPL L TO P-CCNC: 262 U/L — HIGH (ref 50–242)
LYMPHOCYTES # BLD AUTO: SIGNIFICANT CHANGE UP (ref 1–3.3)
MAGNESIUM SERPL-MCNC: 1.6 MG/DL — SIGNIFICANT CHANGE UP (ref 1.6–2.6)
MCHC RBC-ENTMCNC: 35.7 GM/DL — SIGNIFICANT CHANGE UP (ref 32–36)
MCHC RBC-ENTMCNC: 36.4 PG — HIGH (ref 27–34)
MCV RBC AUTO: 102 FL — HIGH (ref 80–100)
METAMYELOCYTES # FLD: 1 % — HIGH (ref 0–0)
MONOCYTES # BLD AUTO: 2.2 K/UL — HIGH (ref 0–0.9)
MONOCYTES NFR BLD AUTO: 21 % — HIGH (ref 2–14)
MYELOCYTES NFR BLD: 1 % — HIGH (ref 0–0)
NEUTROPHILS # BLD AUTO: 6.1 K/UL — SIGNIFICANT CHANGE UP (ref 1.8–7.4)
NEUTROPHILS NFR BLD AUTO: 72 % — SIGNIFICANT CHANGE UP (ref 43–77)
NEUTS BAND # BLD: 5 % — SIGNIFICANT CHANGE UP (ref 0–8)
NRBC # BLD: 1 /100 — HIGH (ref 0–0)
PHOSPHATE SERPL-MCNC: 3.3 MG/DL — SIGNIFICANT CHANGE UP (ref 2.5–4.5)
PLAT MORPH BLD: NORMAL — SIGNIFICANT CHANGE UP
PLATELET # BLD AUTO: 23 K/UL — LOW (ref 150–400)
POTASSIUM SERPL-MCNC: 4.2 MMOL/L — SIGNIFICANT CHANGE UP (ref 3.5–5.3)
POTASSIUM SERPL-SCNC: 4.2 MMOL/L — SIGNIFICANT CHANGE UP (ref 3.5–5.3)
PROT SERPL-MCNC: 5.6 G/DL — LOW (ref 6–8.3)
RBC # BLD: 2.29 M/UL — LOW (ref 3.8–5.2)
RBC # FLD: 13.1 % — SIGNIFICANT CHANGE UP (ref 10.3–14.5)
RBC BLD AUTO: SIGNIFICANT CHANGE UP
SODIUM SERPL-SCNC: 142 MMOL/L — SIGNIFICANT CHANGE UP (ref 135–145)
TACROLIMUS SERPL-MCNC: 15.7 NG/ML — SIGNIFICANT CHANGE UP
WBC # BLD: 8.7 K/UL — SIGNIFICANT CHANGE UP (ref 3.8–10.5)
WBC # FLD AUTO: 8.7 K/UL — SIGNIFICANT CHANGE UP (ref 3.8–10.5)

## 2019-03-02 PROCEDURE — 99291 CRITICAL CARE FIRST HOUR: CPT

## 2019-03-02 RX ORDER — TACROLIMUS 5 MG/1
1 CAPSULE ORAL EVERY 12 HOURS
Qty: 0 | Refills: 0 | Status: DISCONTINUED | OUTPATIENT
Start: 2019-03-03 | End: 2019-03-04

## 2019-03-02 RX ADMIN — HYDROMORPHONE HYDROCHLORIDE 1.5 MILLIGRAM(S): 2 INJECTION INTRAMUSCULAR; INTRAVENOUS; SUBCUTANEOUS at 22:30

## 2019-03-02 RX ADMIN — ONDANSETRON 8 MILLIGRAM(S): 8 TABLET, FILM COATED ORAL at 05:57

## 2019-03-02 RX ADMIN — MAGNESIUM OXIDE 400 MG ORAL TABLET 400 MILLIGRAM(S): 241.3 TABLET ORAL at 12:01

## 2019-03-02 RX ADMIN — ONDANSETRON 8 MILLIGRAM(S): 8 TABLET, FILM COATED ORAL at 21:16

## 2019-03-02 RX ADMIN — HYDROMORPHONE HYDROCHLORIDE 1.5 MILLIGRAM(S): 2 INJECTION INTRAMUSCULAR; INTRAVENOUS; SUBCUTANEOUS at 13:30

## 2019-03-02 RX ADMIN — Medication 100 MILLIGRAM(S): at 13:29

## 2019-03-02 RX ADMIN — HYDROMORPHONE HYDROCHLORIDE 1.5 MILLIGRAM(S): 2 INJECTION INTRAMUSCULAR; INTRAVENOUS; SUBCUTANEOUS at 13:00

## 2019-03-02 RX ADMIN — Medication 1 TABLET(S): at 12:01

## 2019-03-02 RX ADMIN — MYCOPHENOLATE MOFETIL 1000 MILLIGRAM(S): 250 CAPSULE ORAL at 21:14

## 2019-03-02 RX ADMIN — LEVETIRACETAM 500 MILLIGRAM(S): 250 TABLET, FILM COATED ORAL at 05:54

## 2019-03-02 RX ADMIN — TACROLIMUS 1.5 MILLIGRAM(S): 5 CAPSULE ORAL at 05:53

## 2019-03-02 RX ADMIN — Medication 400 MILLIGRAM(S): at 13:29

## 2019-03-02 RX ADMIN — HYDROMORPHONE HYDROCHLORIDE 1.5 MILLIGRAM(S): 2 INJECTION INTRAMUSCULAR; INTRAVENOUS; SUBCUTANEOUS at 04:50

## 2019-03-02 RX ADMIN — Medication 5 MILLILITER(S): at 20:38

## 2019-03-02 RX ADMIN — HYDROMORPHONE HYDROCHLORIDE 1.5 MILLIGRAM(S): 2 INJECTION INTRAMUSCULAR; INTRAVENOUS; SUBCUTANEOUS at 19:20

## 2019-03-02 RX ADMIN — HYDROMORPHONE HYDROCHLORIDE 1.5 MILLIGRAM(S): 2 INJECTION INTRAMUSCULAR; INTRAVENOUS; SUBCUTANEOUS at 23:00

## 2019-03-02 RX ADMIN — HYDROMORPHONE HYDROCHLORIDE 1.5 MILLIGRAM(S): 2 INJECTION INTRAMUSCULAR; INTRAVENOUS; SUBCUTANEOUS at 19:40

## 2019-03-02 RX ADMIN — Medication 125 MILLIGRAM(S): at 05:53

## 2019-03-02 RX ADMIN — Medication 100 MILLIGRAM(S): at 05:54

## 2019-03-02 RX ADMIN — ONDANSETRON 8 MILLIGRAM(S): 8 TABLET, FILM COATED ORAL at 13:28

## 2019-03-02 RX ADMIN — Medication 10 MILLILITER(S): at 08:09

## 2019-03-02 RX ADMIN — NYSTATIN CREAM 1 APPLICATION(S): 100000 CREAM TOPICAL at 21:17

## 2019-03-02 RX ADMIN — Medication 1 LOZENGE: at 20:38

## 2019-03-02 RX ADMIN — NYSTATIN CREAM 1 APPLICATION(S): 100000 CREAM TOPICAL at 05:58

## 2019-03-02 RX ADMIN — Medication 125 MILLIGRAM(S): at 18:18

## 2019-03-02 RX ADMIN — Medication 400 MILLIGRAM(S): at 21:18

## 2019-03-02 RX ADMIN — Medication 1 LOZENGE: at 12:00

## 2019-03-02 RX ADMIN — Medication 5 MILLILITER(S): at 12:00

## 2019-03-02 RX ADMIN — Medication 100 MILLIGRAM(S): at 21:15

## 2019-03-02 RX ADMIN — Medication 1 MILLIGRAM(S): at 12:01

## 2019-03-02 RX ADMIN — FLUCONAZOLE 400 MILLIGRAM(S): 150 TABLET ORAL at 12:01

## 2019-03-02 RX ADMIN — HYDROMORPHONE HYDROCHLORIDE 1.5 MILLIGRAM(S): 2 INJECTION INTRAMUSCULAR; INTRAVENOUS; SUBCUTANEOUS at 16:45

## 2019-03-02 RX ADMIN — Medication 10 MILLILITER(S): at 16:16

## 2019-03-02 RX ADMIN — LORATADINE 10 MILLIGRAM(S): 10 TABLET ORAL at 12:01

## 2019-03-02 RX ADMIN — Medication 10 MILLILITER(S): at 11:57

## 2019-03-02 RX ADMIN — HYDROMORPHONE HYDROCHLORIDE 1.5 MILLIGRAM(S): 2 INJECTION INTRAMUSCULAR; INTRAVENOUS; SUBCUTANEOUS at 09:02

## 2019-03-02 RX ADMIN — Medication 5 MILLILITER(S): at 08:14

## 2019-03-02 RX ADMIN — HYDROMORPHONE HYDROCHLORIDE 1.5 MILLIGRAM(S): 2 INJECTION INTRAMUSCULAR; INTRAVENOUS; SUBCUTANEOUS at 16:15

## 2019-03-02 RX ADMIN — MAGNESIUM OXIDE 400 MG ORAL TABLET 400 MILLIGRAM(S): 241.3 TABLET ORAL at 08:14

## 2019-03-02 RX ADMIN — LEVETIRACETAM 500 MILLIGRAM(S): 250 TABLET, FILM COATED ORAL at 18:18

## 2019-03-02 RX ADMIN — HYDROMORPHONE HYDROCHLORIDE 1.5 MILLIGRAM(S): 2 INJECTION INTRAMUSCULAR; INTRAVENOUS; SUBCUTANEOUS at 00:14

## 2019-03-02 RX ADMIN — MYCOPHENOLATE MOFETIL 1000 MILLIGRAM(S): 250 CAPSULE ORAL at 05:56

## 2019-03-02 RX ADMIN — MYCOPHENOLATE MOFETIL 1000 MILLIGRAM(S): 250 CAPSULE ORAL at 13:28

## 2019-03-02 RX ADMIN — URSODIOL 300 MILLIGRAM(S): 250 TABLET, FILM COATED ORAL at 08:14

## 2019-03-02 RX ADMIN — Medication 125 MILLIGRAM(S): at 12:00

## 2019-03-02 RX ADMIN — Medication 10 MILLILITER(S): at 20:39

## 2019-03-02 RX ADMIN — HYDROMORPHONE HYDROCHLORIDE 1.5 MILLIGRAM(S): 2 INJECTION INTRAMUSCULAR; INTRAVENOUS; SUBCUTANEOUS at 04:20

## 2019-03-02 RX ADMIN — Medication 1 LOZENGE: at 08:14

## 2019-03-02 RX ADMIN — AMLODIPINE BESYLATE 5 MILLIGRAM(S): 2.5 TABLET ORAL at 05:53

## 2019-03-02 RX ADMIN — Medication 5 MILLILITER(S): at 16:16

## 2019-03-02 RX ADMIN — Medication 1 LOZENGE: at 16:16

## 2019-03-02 RX ADMIN — Medication 400 MILLIGRAM(S): at 05:54

## 2019-03-02 RX ADMIN — LOSARTAN POTASSIUM 100 MILLIGRAM(S): 100 TABLET, FILM COATED ORAL at 05:57

## 2019-03-02 RX ADMIN — APREPITANT 80 MILLIGRAM(S): 80 CAPSULE ORAL at 12:00

## 2019-03-02 RX ADMIN — URSODIOL 300 MILLIGRAM(S): 250 TABLET, FILM COATED ORAL at 18:18

## 2019-03-02 RX ADMIN — HYDROMORPHONE HYDROCHLORIDE 1.5 MILLIGRAM(S): 2 INJECTION INTRAMUSCULAR; INTRAVENOUS; SUBCUTANEOUS at 09:30

## 2019-03-02 RX ADMIN — MAGNESIUM OXIDE 400 MG ORAL TABLET 400 MILLIGRAM(S): 241.3 TABLET ORAL at 18:18

## 2019-03-02 RX ADMIN — PANTOPRAZOLE SODIUM 40 MILLIGRAM(S): 20 TABLET, DELAYED RELEASE ORAL at 05:58

## 2019-03-02 NOTE — PROGRESS NOTE ADULT - ATTENDING COMMENTS
58 year old female with PMH [place] chromosome positive ALL diagnosed in 2016 s/p R HyperCVAD X 4 cycles, IT MTX X 2, s/p autologous stem cell transplantation (12/16/16),  who presented in October 2018 with subdural hemorrhage and relapsed disease confirmed by peripheral blood flow cytometry treated with Inotuzumab X 2 cycles.  Bone marrow biopsy on 1/23/19 showed remission with FISH and PCR for BCR-ABL translocation negative on the BM specimen but peripheral blood testing on 1/31 showed .004% BCR-ABL transcript with negative FISH suggesting MRD positivity.  Patient was on Ponatinib, DC 1/31/19. LFT's improved off drug.  Now in CR2    Patient was admitted for haploidentical stem cell transplantation with fludarabine/cytoxan/TBI conditioning. She is s/p HPC transplant, day + 16  Completed high-dose Cytoxan on days +3, +4(GVHD prophylaxis); then begin Tacrolimus, Cellcept on day +5.  Begin Zarxio on day +5  + WBC engraftment- discontinue Zarxio. Monitor daily CBC with diff. Monitor for aGVHD.  Neutropenic fevers resolved, s/p haplo storm - on Cefepime, culture from 2/14, 2/16 negative.  CXR negative.  On Acyclovir, Fluconazole prophylaxis.  Neutropenia now resolved with engraftment and growth factor support- D/C Cefepime and Zarxio  C. Diff diarrhea- continue oral Vancomycin, diarrhea improving  Monitor I/O's/weights- lasix as needed  VOD prophylaxis as per protocol.  Mouth care, skin care  Antiemetics as needed    History of SDH, continue Keppra. CT scan baseline 2/7/19 negative for SDH.   History of HTN continue Losartan.  OOB/ambulate 58 year old female with PMH [place] chromosome positive ALL diagnosed in 2016 s/p R HyperCVAD X 4 cycles, IT MTX X 2, s/p autologous stem cell transplantation (12/16/16),  who presented in October 2018 with subdural hemorrhage and relapsed disease confirmed by peripheral blood flow cytometry treated with Inotuzumab X 2 cycles.  Bone marrow biopsy on 1/23/19 showed remission with FISH and PCR for BCR-ABL translocation negative on the BM specimen but peripheral blood testing on 1/31 showed .004% BCR-ABL transcript with negative FISH suggesting MRD positivity.  Patient was on Ponatinib, DC 1/31/19. LFT's improved off drug.  Now in CR2    Patient was admitted for haploidentical stem cell transplantation with fludarabine/cytoxan/TBI conditioning. She is s/p HPC transplant, day + 17  Completed high-dose Cytoxan on days +3, +4(GVHD prophylaxis); then begin Tacrolimus, Cellcept on day +5.  Begin Zarxio on day +5  + WBC engraftment- discontinued Zarxio. Monitor daily CBC with diff. Monitor for aGVHD.  Neutropenic fevers resolved, s/p haplo storm - on Cefepime, culture from 2/14, 2/16 negative.  CXR negative.  On Acyclovir, Fluconazole prophylaxis.  Neutropenia now resolved with engraftment and growth factor support- D/C'd Cefepime and Zarxio  C. Diff diarrhea- continue oral Vancomycin, diarrhea resolved  Monitor I/O's/weights- lasix as needed  VOD prophylaxis as per protocol.  Mouth care, skin care  Antiemetics as needed    History of SDH, continue Keppra. CT scan baseline 2/7/19 negative for SDH.   History of HTN continue Losartan.  OOB/ambulate

## 2019-03-02 NOTE — PROGRESS NOTE ADULT - SUBJECTIVE AND OBJECTIVE BOX
HPC Transplant Team                                                      Critical / Counseling Time Provided: 30 minutes                                                                                                                                                        Chief Complaint:     S: Patient seen and examined with HPC Transplant Team:   Denies mouth / tongue / throat pain, dyspnea, cough, nausea, vomiting, diarrhea, abdominal pain     O: Vitals:   Vital Signs Last 24 Hrs  T(C): 36.5 (02 Mar 2019 05:01), Max: 36.9 (01 Mar 2019 09:13)  T(F): 97.7 (02 Mar 2019 05:01), Max: 98.4 (01 Mar 2019 09:13)  HR: 92 (02 Mar 2019 05:01) (85 - 97)  BP: 138/87 (02 Mar 2019 05:01) (121/84 - 154/84)  BP(mean): --  RR: 18 (02 Mar 2019 05:01) (18 - 19)  SpO2: 98% (02 Mar 2019 05:01) (97% - 100%)    Admit weight:   Daily     Daily Weight in k (01 Mar 2019 09:13)    Intake / Output:    @ 07:01  -  03-02 @ 07:00  --------------------------------------------------------  IN: 1413.1 mL / OUT: 1275 mL / NET: 138.1 mL          PE:   Oropharynx:   Oral Mucositis:                                                        Grade:   CVS:   Lungs:   Abdomen:  Extremities:   Gastric Mucositis:                                                  Grade:   Intestinal Mucositis:                                              Grade:   Skin:   TLC:   Neuro:   Pain:     Labs:   CBC Full  -  ( 02 Mar 2019 06:37 )  WBC Count : 8.7 K/uL  Hemoglobin : 8.4 g/dL  Hematocrit : 23.4 %  Platelet Count - Automated : 23 K/uL  Mean Cell Volume : 102.0 fl  Mean Cell Hemoglobin : 36.4 pg  Mean Cell Hemoglobin Concentration : 35.7 gm/dL  Auto Neutrophil # : x  Auto Lymphocyte # : x  Auto Monocyte # : x  Auto Eosinophil # : x  Auto Basophil # : x  Auto Neutrophil % : x  Auto Lymphocyte % : x  Auto Monocyte % : x  Auto Eosinophil % : x  Auto Basophil % : x                          8.4    8.7   )-----------( 23       ( 02 Mar 2019 06:37 )             23.4     03-02    142  |  106  |  10  ----------------------------<  97  4.2   |  27  |  0.94    Ca    10.4      02 Mar 2019 06:37  Phos  3.3       Mg     1.6         TPro  5.6<L>  /  Alb  3.3  /  TBili  0.1<L>  /  DBili  x   /  AST  21  /  ALT  17  /  AlkPhos  176<H>        LIVER FUNCTIONS - ( 02 Mar 2019 06:37 )  Alb: 3.3 g/dL / Pro: 5.6 g/dL / ALK PHOS: 176 U/L / ALT: 17 U/L / AST: 21 U/L / GGT: x           Lactate Dehydrogenase, Serum: 262 U/L ( @ 06:37)          Karnofsky / Lansky Scale:   GVHD:   Skin:   Liver:   Gut:   Overall Grade:       Cultures:         Radiology:       Meds:   Antimicrobials:   acyclovir   Oral Tab/Cap 400 milliGRAM(s) Oral every 8 hours  clotrimazole Lozenge 1 Lozenge Oral five times a day  fluconAZOLE   Tablet 400 milliGRAM(s) Oral daily  vancomycin    Solution 125 milliGRAM(s) Oral every 6 hours      Heme / Onc:   heparin  Infusion 464 Unit(s)/Hr IV Continuous <Continuous>      GI:  docusate sodium 100 milliGRAM(s) Oral three times a day PRN  pantoprazole    Tablet 40 milliGRAM(s) Oral before breakfast  polyethylene glycol 3350 17 Gram(s) Oral daily  senna 1 Tablet(s) Oral at bedtime PRN  sodium bicarbonate Mouth Rinse 10 milliLiter(s) Swish and Spit five times a day  ursodiol Capsule 300 milliGRAM(s) Oral two times a day with meals      Cardiovascular:   amLODIPine   Tablet 5 milliGRAM(s) Oral daily  losartan 100 milliGRAM(s) Oral daily      Immunologic:   immune   globulin 10% (GAMMAGARD) IVPB 20 Gram(s) IV Intermittent <User Schedule>  mycophenolate mofetil 1000 milliGRAM(s) Oral <User Schedule>  tacrolimus 1.5 milliGRAM(s) Oral every 12 hours      Other medications:   acetaminophen   Tablet .. 650 milliGRAM(s) Oral every 6 hours  acetaminophen   Tablet .. 650 milliGRAM(s) Oral <User Schedule>  aprepitant 80 milliGRAM(s) Oral daily  benzonatate 100 milliGRAM(s) Oral every 8 hours  Biotene Dry Mouth Oral Rinse 5 milliLiter(s) Swish and Spit five times a day  diphenhydrAMINE   Injectable 25 milliGRAM(s) IV Push every 3 weeks  folic acid 1 milliGRAM(s) Oral daily  levETIRAcetam 500 milliGRAM(s) Oral two times a day  lidocaine/prilocaine Cream 1 Application(s) Topical daily  loratadine 10 milliGRAM(s) Oral daily  magnesium oxide 400 milliGRAM(s) Oral three times a day with meals  multivitamin 1 Tablet(s) Oral daily  nystatin Powder 1 Application(s) Topical every 8 hours  ondansetron Injectable 8 milliGRAM(s) IV Push every 8 hours  sodium chloride 0.9%. 1000 milliLiter(s) IV Continuous <Continuous>      PRN:   acetaminophen   Tablet .. 650 milliGRAM(s) Oral every 6 hours PRN  acetaminophen   Tablet .. 650 milliGRAM(s) Oral every 6 hours PRN  diphenhydrAMINE   Injectable 25 milliGRAM(s) IV Push every 4 hours PRN  docusate sodium 100 milliGRAM(s) Oral three times a day PRN  FIRST- Mouthwash  BLM 5 milliLiter(s) Swish and Swallow every 3 hours PRN  HYDROmorphone  Injectable 1.5 milliGRAM(s) IV Push every 3 hours PRN  LORazepam   Injectable 0.5 milliGRAM(s) IV Push every 6 hours PRN  metoclopramide Injectable 10 milliGRAM(s) IV Push every 6 hours PRN  oxyCODONE    IR 5 milliGRAM(s) Oral every 6 hours PRN  senna 1 Tablet(s) Oral at bedtime PRN      A/P:   ___ year old ___  with a history of ______________________  Pre / Status Post :  Autologous / Allogeneic PBSCT / BMT day ____________    1. Infectious Disease:   Fluconazole, Acyclovir     2. VOD Prophylaxis: Actigall, Glutamine, Heparin (dosed at 100 units / kg / day)     3. GI Prophylaxis:  Protonix    4. Mouthcare - NS / NaHCO3 rinses, Mycelex, Caphosol, skin care     5. GVHD prophylaxis     6. Transfuse & replete electrolytes prn     7. IV hydration, daily weights, strict I&O, prn diuresis     8. PO intake as tolerated, nutrition follow up as needed, MVI, folic acid     9. Antiemetics, anti-diarrhea medications:   Reglan, Ativan    10. OOB as tolerated, physical therapy consult if needed     11. Monitor coags / fibrinogen 2x week, vitamin K as needed     12. Monitor closely for clinical changes, monitor for fevers     13. Emotional support provided, plan of care discussed with patient and family, questions addressed     14. Patient education done regarding chemotherapy prep, plan of care, restrictions and discharge planning     15. Continue regular social work input     I have written the above note for Dr. Logan who performed service with me in the room.   Cedric Hernadez  NP-C ([phone])    I have seen and examined patient with NP, I agree with above note as scribed. HPC Transplant Team                                                      Critical / Counseling Time Provided: 30 minutes                                                                                                                                                        Chief Complaint:     S: Patient seen and examined with [facility] Transplant Team:   + bone pain  improved  + rare cough  + chronic low back pain   + BLE  pain 7/10, s/p Zarxio  no nausea today    Denies mouth / tongue / throat pain, nausea, vomiting, dyspnea, abdominal pain    O: Vitals:   Vital Signs Last 24 Hrs  T(C): 36.5 (02 Mar 2019 05:01), Max: 36.9 (01 Mar 2019 09:13)  T(F): 97.7 (02 Mar 2019 05:01), Max: 98.4 (01 Mar 2019 09:13)  HR: 92 (02 Mar 2019 05:01) (85 - 97)  BP: 138/87 (02 Mar 2019 05:01) (121/84 - 154/84)  BP(mean): --  RR: 18 (02 Mar 2019 05:01) (18 - 19)  SpO2: 98% (02 Mar 2019 05:01) (97% - 100%)    Admit weight:   Daily     Daily Weight in k (01 Mar 2019 09:13)    Intake / Output:   03- @ 07:01  -  03-02 @ 07:00  --------------------------------------------------------  IN: 1413.1 mL / OUT: 1275 mL / NET: 138.1 mL    PE:   Oropharynx: no erythema or ulcers; resolving blood blister on the lower lip   Oral Mucositis: n/a                                                       stGstrstastdstest:st st1st CVS: S1S2, RRR  Lungs: CTA throughout bilaterally   Abdomen: soft, NT/ND, normoactive BS x 4Q  Extremities: no edema  Gastric Mucositis: n/a                                                 stGstrstastdstest:st st1st Intestinal Mucositis: n/a                                             stGstrstastdstest:st st1st Skin: no rash  TLC: C/D/I  Neuro: A&O x 3   Pain: chronic back pain/ bone pain 7/10    Labs:   CBC Full  -  ( 02 Mar 2019 06:37 )  WBC Count : 8.7 K/uL  Hemoglobin : 8.4 g/dL  Hematocrit : 23.4 %  Platelet Count - Automated : 23 K/uL  Mean Cell Volume : 102.0 fl  Mean Cell Hemoglobin : 36.4 pg  Mean Cell Hemoglobin Concentration : 35.7 gm/dL  Auto Neutrophil # : x  Auto Lymphocyte # : x  Auto Monocyte # : x  Auto Eosinophil # : x  Auto Basophil # : x  Auto Neutrophil % : x  Auto Lymphocyte % : x  Auto Monocyte % : x  Auto Eosinophil % : x  Auto Basophil % : x                          8.4    8.7   )-----------( 23       ( 02 Mar 2019 06:37 )             23.4         142  |  106  |  10  ----------------------------<  97  4.2   |  27  |  0.94    Ca    10.4      02 Mar 2019 06:37  Phos  3.3       Mg     1.6         TPro  5.6<L>  /  Alb  3.3  /  TBili  0.1<L>  /  DBili  x   /  AST  21  /  ALT  17  /  AlkPhos  176<H>        LIVER FUNCTIONS - ( 02 Mar 2019 06:37 )  Alb: 3.3 g/dL / Pro: 5.6 g/dL / ALK PHOS: 176 U/L / ALT: 17 U/L / AST: 21 U/L / GGT: x           Lactate Dehydrogenase, Serum: 262 U/L ( @ 06:37)          Karnofsky / Lansky Scale:   GVHD:   Skin:   Liver:   Gut:   Overall Grade:       Cultures:         Radiology:       Meds:   Antimicrobials:   acyclovir   Oral Tab/Cap 400 milliGRAM(s) Oral every 8 hours  clotrimazole Lozenge 1 Lozenge Oral five times a day  fluconAZOLE   Tablet 400 milliGRAM(s) Oral daily  vancomycin    Solution 125 milliGRAM(s) Oral every 6 hours      Heme / Onc:   heparin  Infusion 464 Unit(s)/Hr IV Continuous <Continuous>      GI:  docusate sodium 100 milliGRAM(s) Oral three times a day PRN  pantoprazole    Tablet 40 milliGRAM(s) Oral before breakfast  polyethylene glycol 3350 17 Gram(s) Oral daily  senna 1 Tablet(s) Oral at bedtime PRN  sodium bicarbonate Mouth Rinse 10 milliLiter(s) Swish and Spit five times a day  ursodiol Capsule 300 milliGRAM(s) Oral two times a day with meals      Cardiovascular:   amLODIPine   Tablet 5 milliGRAM(s) Oral daily  losartan 100 milliGRAM(s) Oral daily      Immunologic:   immune   globulin 10% (GAMMAGARD) IVPB 20 Gram(s) IV Intermittent <User Schedule>  mycophenolate mofetil 1000 milliGRAM(s) Oral <User Schedule>  tacrolimus 1.5 milliGRAM(s) Oral every 12 hours      Other medications:   acetaminophen   Tablet .. 650 milliGRAM(s) Oral every 6 hours  acetaminophen   Tablet .. 650 milliGRAM(s) Oral <User Schedule>  aprepitant 80 milliGRAM(s) Oral daily  benzonatate 100 milliGRAM(s) Oral every 8 hours  Biotene Dry Mouth Oral Rinse 5 milliLiter(s) Swish and Spit five times a day  diphenhydrAMINE   Injectable 25 milliGRAM(s) IV Push every 3 weeks  folic acid 1 milliGRAM(s) Oral daily  levETIRAcetam 500 milliGRAM(s) Oral two times a day  lidocaine/prilocaine Cream 1 Application(s) Topical daily  loratadine 10 milliGRAM(s) Oral daily  magnesium oxide 400 milliGRAM(s) Oral three times a day with meals  multivitamin 1 Tablet(s) Oral daily  nystatin Powder 1 Application(s) Topical every 8 hours  ondansetron Injectable 8 milliGRAM(s) IV Push every 8 hours  sodium chloride 0.9%. 1000 milliLiter(s) IV Continuous <Continuous>      PRN:   acetaminophen   Tablet .. 650 milliGRAM(s) Oral every 6 hours PRN  acetaminophen   Tablet .. 650 milliGRAM(s) Oral every 6 hours PRN  diphenhydrAMINE   Injectable 25 milliGRAM(s) IV Push every 4 hours PRN  docusate sodium 100 milliGRAM(s) Oral three times a day PRN  FIRST- Mouthwash  BLM 5 milliLiter(s) Swish and Swallow every 3 hours PRN  HYDROmorphone  Injectable 1.5 milliGRAM(s) IV Push every 3 hours PRN  LORazepam   Injectable 0.5 milliGRAM(s) IV Push every 6 hours PRN  metoclopramide Injectable 10 milliGRAM(s) IV Push every 6 hours PRN  oxyCODONE    IR 5 milliGRAM(s) Oral every 6 hours PRN  senna 1 Tablet(s) Oral at bedtime PRN      A/P:   ___ year old ___  with a history of ______________________  Pre / Status Post :  Autologous / Allogeneic PBSCT / BMT day ____________    1. Infectious Disease:   Fluconazole, Acyclovir     2. VOD Prophylaxis: Actigall, Glutamine, Heparin (dosed at 100 units / kg / day)     3. GI Prophylaxis:  Protonix    4. Mouthcare - NS / NaHCO3 rinses, Mycelex, Caphosol, skin care     5. GVHD prophylaxis     6. Transfuse & replete electrolytes prn     7. IV hydration, daily weights, strict I&O, prn diuresis     8. PO intake as tolerated, nutrition follow up as needed, MVI, folic acid     9. Antiemetics, anti-diarrhea medications:   Reglan, Ativan    10. OOB as tolerated, physical therapy consult if needed     11. Monitor coags / fibrinogen 2x week, vitamin K as needed     12. Monitor closely for clinical changes, monitor for fevers     13. Emotional support provided, plan of care discussed with patient and family, questions addressed     14. Patient education done regarding chemotherapy prep, plan of care, restrictions and discharge planning     15. Continue regular social work input     I have written the above note for Dr. Logan who performed service with me in the room.   Cedric Hernadez  NP-C ([phone])    I have seen and examined patient with NP, I agree with above note as scribed. 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    S: Patient seen and examined with [facility] Transplant Team:   + bone pain  improved  + rare cough  + chronic low back pain   + BLE  pain 7/10, s/p Zarxio  no nausea today    Denies mouth / tongue / throat pain, nausea, vomiting, dyspnea, abdominal pain    O: Vitals:   Vital Signs Last 24 Hrs  T(C): 36.5 (02 Mar 2019 05:01), Max: 36.9 (01 Mar 2019 09:13)  T(F): 97.7 (02 Mar 2019 05:01), Max: 98.4 (01 Mar 2019 09:13)  HR: 92 (02 Mar 2019 05:01) (85 - 97)  BP: 138/87 (02 Mar 2019 05:01) (121/84 - 154/84)  BP(mean): --  RR: 18 (02 Mar 2019 05:01) (18 - 19)  SpO2: 98% (02 Mar 2019 05:01) (97% - 100%)    Admit weight: 111.4 KG    Daily Weight in k.4 (02 Mar 2019 09:13)    Intake / Output:   03- @ 07:01  -  -02 @ 07:00  --------------------------------------------------------  IN: 1413.1 mL / OUT: 1275 mL / NET: 138.1 mL    PE:   Oropharynx: no erythema or ulcers; resolving blood blister on the lower lip   Oral Mucositis: n/a                                                       stGstrstastdstest:st st1st CVS: S1S2, RRR  Lungs: CTA throughout bilaterally   Abdomen: soft, NT/ND, normoactive BS x 4Q  Extremities: no edema  Gastric Mucositis: n/a                                                 stGstrstastdstest:st st1st Intestinal Mucositis: n/a                                             stGstrstastdstest:st st1st Skin: no rash  TLC: C/D/I  Neuro: A&O x 3   Pain: chronic back pain/ bone pain 7/10    Labs:   CBC Full  -  ( 02 Mar 2019 06:37 )  WBC Count : 8.7 K/uL  Hemoglobin : 8.4 g/dL  Hematocrit : 23.4 %  Platelet Count - Automated : 23 K/uL  Mean Cell Volume : 102.0 fl  Mean Cell Hemoglobin : 36.4 pg  Mean Cell Hemoglobin Concentration : 35.7 gm/dL  Auto Neutrophil # : x  Auto Lymphocyte # : x  Auto Monocyte # : x  Auto Eosinophil # : x  Auto Basophil # : x  Auto Neutrophil % : x  Auto Lymphocyte % : x  Auto Monocyte % : x  Auto Eosinophil % : x  Auto Basophil % : x                          8.4    8.7   )-----------( 23       ( 02 Mar 2019 06:37 )             23.4         142  |  106  |  10  ----------------------------<  97  4.2   |  27  |  0.94    Ca    10.4      02 Mar 2019 06:37  Phos  3.3       Mg     1.6         TPro  5.6<L>  /  Alb  3.3  /  TBili  0.1<L>  /  DBili  x   /  AST  21  /  ALT  17  /  AlkPhos  176<H>        LIVER FUNCTIONS - ( 02 Mar 2019 06:37 )  Alb: 3.3 g/dL / Pro: 5.6 g/dL / ALK PHOS: 176 U/L / ALT: 17 U/L / AST: 21 U/L / GGT: x           Lactate Dehydrogenase, Serum: 262 U/L ( @ 06:37)    Cultures:   Culture - Urine (19 @ 18:41)    Specimen Source: .Urine Clean Catch (Midstream)    Culture Results:   <10,000 CFU/ml Normal Urogenital everton present    Culture - Blood (19 @ 18:30)    Specimen Source: .Blood Blood-Peripheral    Culture Results:   No growth at 5 days.    Radiology:   < from: Xray Chest 1 View- PORTABLE-Urgent (19 @ 09:12) >  1.  The lungs are clear.    Meds:   Antimicrobials:   acyclovir   Oral Tab/Cap 400 milliGRAM(s) Oral every 8 hours  clotrimazole Lozenge 1 Lozenge Oral five times a day  fluconAZOLE   Tablet 400 milliGRAM(s) Oral daily  vancomycin    Solution 125 milliGRAM(s) Oral every 6 hours    Heme / Onc:   heparin  Infusion 464 Unit(s)/Hr IV Continuous <Continuous>    GI:  docusate sodium 100 milliGRAM(s) Oral three times a day PRN  pantoprazole    Tablet 40 milliGRAM(s) Oral before breakfast  polyethylene glycol 3350 17 Gram(s) Oral daily  senna 1 Tablet(s) Oral at bedtime PRN  sodium bicarbonate Mouth Rinse 10 milliLiter(s) Swish and Spit five times a day  ursodiol Capsule 300 milliGRAM(s) Oral two times a day with meals    Cardiovascular:   amLODIPine   Tablet 5 milliGRAM(s) Oral daily  losartan 100 milliGRAM(s) Oral daily    Immunologic:   immune   globulin 10% (GAMMAGARD) IVPB 20 Gram(s) IV Intermittent <User Schedule>  mycophenolate mofetil 1000 milliGRAM(s) Oral <User Schedule>  tacrolimus 1.5 milliGRAM(s) Oral every 12 hours    Other medications:   acetaminophen   Tablet .. 650 milliGRAM(s) Oral every 6 hours  acetaminophen   Tablet .. 650 milliGRAM(s) Oral <User Schedule>  aprepitant 80 milliGRAM(s) Oral daily  benzonatate 100 milliGRAM(s) Oral every 8 hours  Biotene Dry Mouth Oral Rinse 5 milliLiter(s) Swish and Spit five times a day  diphenhydrAMINE   Injectable 25 milliGRAM(s) IV Push every 3 weeks  folic acid 1 milliGRAM(s) Oral daily  levETIRAcetam 500 milliGRAM(s) Oral two times a day  lidocaine/prilocaine Cream 1 Application(s) Topical daily  loratadine 10 milliGRAM(s) Oral daily  magnesium oxide 400 milliGRAM(s) Oral three times a day with meals  multivitamin 1 Tablet(s) Oral daily  nystatin Powder 1 Application(s) Topical every 8 hours  ondansetron Injectable 8 milliGRAM(s) IV Push every 8 hours  sodium chloride 0.9%. 1000 milliLiter(s) IV Continuous <Continuous>    PRN:   acetaminophen   Tablet .. 650 milliGRAM(s) Oral every 6 hours PRN  acetaminophen   Tablet .. 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 diff - started vancomycin 125mg po q hours   Platelet refractory - platelets dispensed as 1/2 units to be given over 3 hours q 12 hours   increased back/bone pain with nausea- increased Dilaudid, added Ativan prn and Emend 80mg x 3 days  Engrafted off  Zarxio and Cefepime.   level 15.7 today,  check daily levels  Discharge planning for 3/4    1. Infectious Disease:   acyclovir   Oral Tab/Cap 400 milliGRAM(s) Oral every 8 hours  clotrimazole Lozenge 1 Lozenge Oral five times a day  fluconAZOLE   Tablet 400 milliGRAM(s) Oral daily  vancomycin    Solution 125 milliGRAM(s) Oral every 6 hours    2. VOD Prophylaxis: Actigall, Glutamine, Heparin (dosed at 100 units / kg / day)     3. GI Prophylaxis:    pantoprazole    Tablet 40 milliGRAM(s) Oral before breakfast    4. Mouth care - NS / NaHCO3 rinses, Mycelex, Biotene; Skin care     5. GVHD prophylaxis   mycophenolate mofetil 1000 milliGRAM(s) Oral <User Schedule>  tacrolimus 1.5 milliGRAM(s) Oral every 12 hours    6. Transfuse & replete electrolytes prn   magnesium oxide 400 milliGRAM(s) Oral three times a day with meals    7. IV hydration, daily weights, strict I&O, prn diuresis     8. PO intake as tolerated, nutrition follow up as needed, MVI, folic acid     9. Antiemetics, anti-diarrhea medications:   metoclopramide Injectable 10 milliGRAM(s) IV Push every 6 hours PRN  ondansetron Injectable 8 milliGRAM(s) IV Push every 8 hours    10. OOB as tolerated, physical therapy consult if needed     11. Monitor coags / fibrinogen 2x week, vitamin K as needed     12. Monitor closely for clinical changes, monitor for fevers     13. Emotional support provided, plan of care discussed and questions addressed     14. Patient education done regarding plan of care, restrictions and discharge planning     15. Continue regular social work input     I have written the above note for Dr. Logan who performed service with me in the room.   Cedric Hernadez  NP-C ([phone])    I have seen and examined patient with NP, I agree with above note as scribed. 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    S: Patient seen and examined with [facility] Transplant Team:   + bone pain  improved  + rare cough  + chronic low back pain   + BLE  pain 7/10, s/p Zarxio  no nausea today    Denies mouth / tongue / throat pain, nausea, vomiting, dyspnea, abdominal pain    O: Vitals:   Vital Signs Last 24 Hrs  T(C): 36.5 (02 Mar 2019 05:01), Max: 36.9 (01 Mar 2019 09:13)  T(F): 97.7 (02 Mar 2019 05:01), Max: 98.4 (01 Mar 2019 09:13)  HR: 92 (02 Mar 2019 05:01) (85 - 97)  BP: 138/87 (02 Mar 2019 05:01) (121/84 - 154/84)  BP(mean): --  RR: 18 (02 Mar 2019 05:01) (18 - 19)  SpO2: 98% (02 Mar 2019 05:01) (97% - 100%)    Admit weight: 111.4 KG    Daily Weight in k.4 (02 Mar 2019 09:13)    Intake / Output:   03- @ 07:01  -  -02 @ 07:00  --------------------------------------------------------  IN: 1413.1 mL / OUT: 1275 mL / NET: 138.1 mL    PE:   Oropharynx: no erythema or ulcers; resolving blood blister on the lower lip   Oral Mucositis: n/a                                                       stGstrstastdstest:st st1st CVS: S1S2, RRR  Lungs: CTA throughout bilaterally   Abdomen: soft, NT/ND, normoactive BS x 4Q  Extremities: no edema  Gastric Mucositis: n/a                                                 stGstrstastdstest:st st1st Intestinal Mucositis: n/a                                             stGstrstastdstest:st st1st Skin: no rash  TLC: C/D/I  Neuro: A&O x 3   Pain: chronic back pain/ bone pain 7/10    Labs:   CBC Full  -  ( 02 Mar 2019 06:37 )  WBC Count : 8.7 K/uL  Hemoglobin : 8.4 g/dL  Hematocrit : 23.4 %  Platelet Count - Automated : 23 K/uL  Mean Cell Volume : 102.0 fl  Mean Cell Hemoglobin : 36.4 pg  Mean Cell Hemoglobin Concentration : 35.7 gm/dL  Auto Neutrophil # : x  Auto Lymphocyte # : x  Auto Monocyte # : x  Auto Eosinophil # : x  Auto Basophil # : x  Auto Neutrophil % : x  Auto Lymphocyte % : x  Auto Monocyte % : x  Auto Eosinophil % : x  Auto Basophil % : x                          8.4    8.7   )-----------( 23       ( 02 Mar 2019 06:37 )             23.4         142  |  106  |  10  ----------------------------<  97  4.2   |  27  |  0.94    Ca    10.4      02 Mar 2019 06:37  Phos  3.3       Mg     1.6         TPro  5.6<L>  /  Alb  3.3  /  TBili  0.1<L>  /  DBili  x   /  AST  21  /  ALT  17  /  AlkPhos  176<H>        LIVER FUNCTIONS - ( 02 Mar 2019 06:37 )  Alb: 3.3 g/dL / Pro: 5.6 g/dL / ALK PHOS: 176 U/L / ALT: 17 U/L / AST: 21 U/L / GGT: x           Lactate Dehydrogenase, Serum: 262 U/L ( @ 06:37)    Cultures:   Culture - Urine (19 @ 18:41)    Specimen Source: .Urine Clean Catch (Midstream)    Culture Results:   <10,000 CFU/ml Normal Urogenital everton present    Culture - Blood (19 @ 18:30)    Specimen Source: .Blood Blood-Peripheral    Culture Results:   No growth at 5 days.    Radiology:   < from: Xray Chest 1 View- PORTABLE-Urgent (19 @ 09:12) >  1.  The lungs are clear.    Meds:   Antimicrobials:   acyclovir   Oral Tab/Cap 400 milliGRAM(s) Oral every 8 hours  clotrimazole Lozenge 1 Lozenge Oral five times a day  fluconAZOLE   Tablet 400 milliGRAM(s) Oral daily  vancomycin    Solution 125 milliGRAM(s) Oral every 6 hours    Heme / Onc:   heparin  Infusion 464 Unit(s)/Hr IV Continuous <Continuous>    GI:  docusate sodium 100 milliGRAM(s) Oral three times a day PRN  pantoprazole    Tablet 40 milliGRAM(s) Oral before breakfast  polyethylene glycol 3350 17 Gram(s) Oral daily  senna 1 Tablet(s) Oral at bedtime PRN  sodium bicarbonate Mouth Rinse 10 milliLiter(s) Swish and Spit five times a day  ursodiol Capsule 300 milliGRAM(s) Oral two times a day with meals    Cardiovascular:   amLODIPine   Tablet 5 milliGRAM(s) Oral daily  losartan 100 milliGRAM(s) Oral daily    Immunologic:   immune   globulin 10% (GAMMAGARD) IVPB 20 Gram(s) IV Intermittent <User Schedule>  mycophenolate mofetil 1000 milliGRAM(s) Oral <User Schedule>  tacrolimus 1.5 milliGRAM(s) Oral every 12 hours    Other medications:   acetaminophen   Tablet .. 650 milliGRAM(s) Oral every 6 hours  acetaminophen   Tablet .. 650 milliGRAM(s) Oral <User Schedule>  aprepitant 80 milliGRAM(s) Oral daily  benzonatate 100 milliGRAM(s) Oral every 8 hours  Biotene Dry Mouth Oral Rinse 5 milliLiter(s) Swish and Spit five times a day  diphenhydrAMINE   Injectable 25 milliGRAM(s) IV Push every 3 weeks  folic acid 1 milliGRAM(s) Oral daily  levETIRAcetam 500 milliGRAM(s) Oral two times a day  lidocaine/prilocaine Cream 1 Application(s) Topical daily  loratadine 10 milliGRAM(s) Oral daily  magnesium oxide 400 milliGRAM(s) Oral three times a day with meals  multivitamin 1 Tablet(s) Oral daily  nystatin Powder 1 Application(s) Topical every 8 hours  ondansetron Injectable 8 milliGRAM(s) IV Push every 8 hours  sodium chloride 0.9%. 1000 milliLiter(s) IV Continuous <Continuous>    PRN:   acetaminophen   Tablet .. 650 milliGRAM(s) Oral every 6 hours PRN  acetaminophen   Tablet .. 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 diff - started vancomycin 125mg po q hours   Platelet refractory - platelets dispensed as 1/2 units to be given over 3 hours q 12 hours   increased back/bone pain with nausea- increased Dilaudid, added Ativan prn and Emend 80mg x 3 days  Engrafted off  Zarxio and Cefepime.   level 15.7 today, will hold the dose tonight and start Tacro 1mg BID starting 3/3, check daily levels  Discharge planning for 3/4    1. Infectious Disease:   acyclovir   Oral Tab/Cap 400 milliGRAM(s) Oral every 8 hours  clotrimazole Lozenge 1 Lozenge Oral five times a day  fluconAZOLE   Tablet 400 milliGRAM(s) Oral daily  vancomycin    Solution 125 milliGRAM(s) Oral every 6 hours    2. VOD Prophylaxis: Actigall, Glutamine, Heparin (dosed at 100 units / kg / day)     3. GI Prophylaxis:    pantoprazole    Tablet 40 milliGRAM(s) Oral before breakfast    4. Mouth care - NS / NaHCO3 rinses, Mycelex, Biotene; Skin care     5. GVHD prophylaxis   mycophenolate mofetil 1000 milliGRAM(s) Oral <User Schedule>  tacrolimus 1.5 milliGRAM(s) Oral every 12 hours    6. Transfuse & replete electrolytes prn   magnesium oxide 400 milliGRAM(s) Oral three times a day with meals    7. IV hydration, daily weights, strict I&O, prn diuresis     8. PO intake as tolerated, nutrition follow up as needed, MVI, folic acid     9. Antiemetics, anti-diarrhea medications:   metoclopramide Injectable 10 milliGRAM(s) IV Push every 6 hours PRN  ondansetron Injectable 8 milliGRAM(s) IV Push every 8 hours    10. OOB as tolerated, physical therapy consult if needed     11. Monitor coags / fibrinogen 2x week, vitamin K as needed     12. Monitor closely for clinical changes, monitor for fevers     13. Emotional support provided, plan of care discussed and questions addressed     14. Patient education done regarding plan of care, restrictions and discharge planning     15. Continue regular social work input     I have written the above note for Dr. Logan who performed service with me in the room.   Cedric Hernadez  NP-C ([phone])    I have seen and examined patient with NP, I agree with above note as scribed.

## 2019-03-03 LAB
ALBUMIN SERPL ELPH-MCNC: 3.1 G/DL — LOW (ref 3.3–5)
ALP SERPL-CCNC: 197 U/L — HIGH (ref 40–120)
ALT FLD-CCNC: 25 U/L — SIGNIFICANT CHANGE UP (ref 10–45)
ANION GAP SERPL CALC-SCNC: 11 MMOL/L — SIGNIFICANT CHANGE UP (ref 5–17)
AST SERPL-CCNC: 28 U/L — SIGNIFICANT CHANGE UP (ref 10–40)
BASOPHILS # BLD AUTO: 0 K/UL — SIGNIFICANT CHANGE UP (ref 0–0.2)
BASOPHILS NFR BLD AUTO: 0 % — SIGNIFICANT CHANGE UP (ref 0–2)
BILIRUB SERPL-MCNC: 0.1 MG/DL — LOW (ref 0.2–1.2)
BUN SERPL-MCNC: 11 MG/DL — SIGNIFICANT CHANGE UP (ref 7–23)
CALCIUM SERPL-MCNC: 10.1 MG/DL — SIGNIFICANT CHANGE UP (ref 8.4–10.5)
CHLORIDE SERPL-SCNC: 105 MMOL/L — SIGNIFICANT CHANGE UP (ref 96–108)
CO2 SERPL-SCNC: 26 MMOL/L — SIGNIFICANT CHANGE UP (ref 22–31)
CREAT SERPL-MCNC: 0.89 MG/DL — SIGNIFICANT CHANGE UP (ref 0.5–1.3)
EOSINOPHIL # BLD AUTO: 0 K/UL — SIGNIFICANT CHANGE UP (ref 0–0.5)
EOSINOPHIL NFR BLD AUTO: 0 % — SIGNIFICANT CHANGE UP (ref 0–6)
GLUCOSE SERPL-MCNC: 92 MG/DL — SIGNIFICANT CHANGE UP (ref 70–99)
HCT VFR BLD CALC: 24.4 % — LOW (ref 34.5–45)
HGB BLD-MCNC: 8.5 G/DL — LOW (ref 11.5–15.5)
LYMPHOCYTES # BLD AUTO: 0.3 K/UL — LOW (ref 1–3.3)
LYMPHOCYTES # BLD AUTO: 1 % — LOW (ref 13–44)
MAGNESIUM SERPL-MCNC: 1.4 MG/DL — LOW (ref 1.6–2.6)
MCHC RBC-ENTMCNC: 34.8 GM/DL — SIGNIFICANT CHANGE UP (ref 32–36)
MCHC RBC-ENTMCNC: 35.1 PG — HIGH (ref 27–34)
MCV RBC AUTO: 101 FL — HIGH (ref 80–100)
METAMYELOCYTES # FLD: 5 % — HIGH (ref 0–0)
MONOCYTES # BLD AUTO: 2.5 K/UL — HIGH (ref 0–0.9)
MONOCYTES NFR BLD AUTO: 32 % — HIGH (ref 2–14)
MYELOCYTES NFR BLD: 7 % — HIGH (ref 0–0)
NEUTROPHILS # BLD AUTO: 4.7 K/UL — SIGNIFICANT CHANGE UP (ref 1.8–7.4)
NEUTROPHILS NFR BLD AUTO: 54 % — SIGNIFICANT CHANGE UP (ref 43–77)
NEUTS BAND # BLD: 1 % — SIGNIFICANT CHANGE UP (ref 0–8)
PHOSPHATE SERPL-MCNC: 3.1 MG/DL — SIGNIFICANT CHANGE UP (ref 2.5–4.5)
PLAT MORPH BLD: NORMAL — SIGNIFICANT CHANGE UP
PLATELET # BLD AUTO: 22 K/UL — LOW (ref 150–400)
POTASSIUM SERPL-MCNC: 4.4 MMOL/L — SIGNIFICANT CHANGE UP (ref 3.5–5.3)
POTASSIUM SERPL-SCNC: 4.4 MMOL/L — SIGNIFICANT CHANGE UP (ref 3.5–5.3)
PROT SERPL-MCNC: 5.7 G/DL — LOW (ref 6–8.3)
RBC # BLD: 2.42 M/UL — LOW (ref 3.8–5.2)
RBC # FLD: 13.2 % — SIGNIFICANT CHANGE UP (ref 10.3–14.5)
RBC BLD AUTO: SIGNIFICANT CHANGE UP
SODIUM SERPL-SCNC: 142 MMOL/L — SIGNIFICANT CHANGE UP (ref 135–145)
TACROLIMUS SERPL-MCNC: 9.8 NG/ML — SIGNIFICANT CHANGE UP
WBC # BLD: 7.5 K/UL — SIGNIFICANT CHANGE UP (ref 3.8–10.5)
WBC # FLD AUTO: 7.5 K/UL — SIGNIFICANT CHANGE UP (ref 3.8–10.5)

## 2019-03-03 PROCEDURE — 99233 SBSQ HOSP IP/OBS HIGH 50: CPT

## 2019-03-03 RX ORDER — MAGNESIUM SULFATE 500 MG/ML
2 VIAL (ML) INJECTION ONCE
Qty: 0 | Refills: 0 | Status: COMPLETED | OUTPATIENT
Start: 2019-03-03 | End: 2019-03-03

## 2019-03-03 RX ADMIN — Medication 10 MILLILITER(S): at 19:31

## 2019-03-03 RX ADMIN — Medication 50 GRAM(S): at 10:59

## 2019-03-03 RX ADMIN — Medication 10 MILLILITER(S): at 01:02

## 2019-03-03 RX ADMIN — NYSTATIN CREAM 1 APPLICATION(S): 100000 CREAM TOPICAL at 06:07

## 2019-03-03 RX ADMIN — Medication 5 MILLILITER(S): at 19:31

## 2019-03-03 RX ADMIN — HYDROMORPHONE HYDROCHLORIDE 1.5 MILLIGRAM(S): 2 INJECTION INTRAMUSCULAR; INTRAVENOUS; SUBCUTANEOUS at 12:30

## 2019-03-03 RX ADMIN — LEVETIRACETAM 500 MILLIGRAM(S): 250 TABLET, FILM COATED ORAL at 06:06

## 2019-03-03 RX ADMIN — MAGNESIUM OXIDE 400 MG ORAL TABLET 400 MILLIGRAM(S): 241.3 TABLET ORAL at 11:46

## 2019-03-03 RX ADMIN — Medication 10 MILLILITER(S): at 08:07

## 2019-03-03 RX ADMIN — HYDROMORPHONE HYDROCHLORIDE 1.5 MILLIGRAM(S): 2 INJECTION INTRAMUSCULAR; INTRAVENOUS; SUBCUTANEOUS at 20:30

## 2019-03-03 RX ADMIN — Medication 1 LOZENGE: at 16:48

## 2019-03-03 RX ADMIN — Medication 5 MILLILITER(S): at 16:47

## 2019-03-03 RX ADMIN — Medication 125 MILLIGRAM(S): at 06:06

## 2019-03-03 RX ADMIN — Medication 5 MILLILITER(S): at 08:25

## 2019-03-03 RX ADMIN — MYCOPHENOLATE MOFETIL 1000 MILLIGRAM(S): 250 CAPSULE ORAL at 06:06

## 2019-03-03 RX ADMIN — Medication 100 MILLIGRAM(S): at 13:07

## 2019-03-03 RX ADMIN — Medication 1 LOZENGE: at 08:26

## 2019-03-03 RX ADMIN — MYCOPHENOLATE MOFETIL 1000 MILLIGRAM(S): 250 CAPSULE ORAL at 21:49

## 2019-03-03 RX ADMIN — Medication 1 TABLET(S): at 11:46

## 2019-03-03 RX ADMIN — Medication 10 MILLILITER(S): at 16:46

## 2019-03-03 RX ADMIN — FLUCONAZOLE 400 MILLIGRAM(S): 150 TABLET ORAL at 11:46

## 2019-03-03 RX ADMIN — MAGNESIUM OXIDE 400 MG ORAL TABLET 400 MILLIGRAM(S): 241.3 TABLET ORAL at 08:26

## 2019-03-03 RX ADMIN — Medication 1 LOZENGE: at 01:02

## 2019-03-03 RX ADMIN — HYDROMORPHONE HYDROCHLORIDE 1.5 MILLIGRAM(S): 2 INJECTION INTRAMUSCULAR; INTRAVENOUS; SUBCUTANEOUS at 20:15

## 2019-03-03 RX ADMIN — HYDROMORPHONE HYDROCHLORIDE 1.5 MILLIGRAM(S): 2 INJECTION INTRAMUSCULAR; INTRAVENOUS; SUBCUTANEOUS at 03:45

## 2019-03-03 RX ADMIN — NYSTATIN CREAM 1 APPLICATION(S): 100000 CREAM TOPICAL at 21:52

## 2019-03-03 RX ADMIN — ONDANSETRON 8 MILLIGRAM(S): 8 TABLET, FILM COATED ORAL at 21:51

## 2019-03-03 RX ADMIN — HYDROMORPHONE HYDROCHLORIDE 1.5 MILLIGRAM(S): 2 INJECTION INTRAMUSCULAR; INTRAVENOUS; SUBCUTANEOUS at 16:15

## 2019-03-03 RX ADMIN — AMLODIPINE BESYLATE 5 MILLIGRAM(S): 2.5 TABLET ORAL at 06:06

## 2019-03-03 RX ADMIN — Medication 400 MILLIGRAM(S): at 21:50

## 2019-03-03 RX ADMIN — Medication 400 MILLIGRAM(S): at 13:08

## 2019-03-03 RX ADMIN — PANTOPRAZOLE SODIUM 40 MILLIGRAM(S): 20 TABLET, DELAYED RELEASE ORAL at 06:06

## 2019-03-03 RX ADMIN — TACROLIMUS 1 MILLIGRAM(S): 5 CAPSULE ORAL at 17:15

## 2019-03-03 RX ADMIN — URSODIOL 300 MILLIGRAM(S): 250 TABLET, FILM COATED ORAL at 08:26

## 2019-03-03 RX ADMIN — MAGNESIUM OXIDE 400 MG ORAL TABLET 400 MILLIGRAM(S): 241.3 TABLET ORAL at 16:49

## 2019-03-03 RX ADMIN — HYDROMORPHONE HYDROCHLORIDE 1.5 MILLIGRAM(S): 2 INJECTION INTRAMUSCULAR; INTRAVENOUS; SUBCUTANEOUS at 23:30

## 2019-03-03 RX ADMIN — URSODIOL 300 MILLIGRAM(S): 250 TABLET, FILM COATED ORAL at 16:49

## 2019-03-03 RX ADMIN — Medication 125 MILLIGRAM(S): at 11:47

## 2019-03-03 RX ADMIN — MYCOPHENOLATE MOFETIL 1000 MILLIGRAM(S): 250 CAPSULE ORAL at 13:08

## 2019-03-03 RX ADMIN — Medication 5 MILLILITER(S): at 11:46

## 2019-03-03 RX ADMIN — HYDROMORPHONE HYDROCHLORIDE 1.5 MILLIGRAM(S): 2 INJECTION INTRAMUSCULAR; INTRAVENOUS; SUBCUTANEOUS at 13:00

## 2019-03-03 RX ADMIN — HYDROMORPHONE HYDROCHLORIDE 1.5 MILLIGRAM(S): 2 INJECTION INTRAMUSCULAR; INTRAVENOUS; SUBCUTANEOUS at 03:30

## 2019-03-03 RX ADMIN — HYDROMORPHONE HYDROCHLORIDE 1.5 MILLIGRAM(S): 2 INJECTION INTRAMUSCULAR; INTRAVENOUS; SUBCUTANEOUS at 16:45

## 2019-03-03 RX ADMIN — Medication 1 LOZENGE: at 11:46

## 2019-03-03 RX ADMIN — HYDROMORPHONE HYDROCHLORIDE 1.5 MILLIGRAM(S): 2 INJECTION INTRAMUSCULAR; INTRAVENOUS; SUBCUTANEOUS at 06:30

## 2019-03-03 RX ADMIN — Medication 100 MILLIGRAM(S): at 06:06

## 2019-03-03 RX ADMIN — HYDROMORPHONE HYDROCHLORIDE 1.5 MILLIGRAM(S): 2 INJECTION INTRAMUSCULAR; INTRAVENOUS; SUBCUTANEOUS at 07:00

## 2019-03-03 RX ADMIN — Medication 125 MILLIGRAM(S): at 01:02

## 2019-03-03 RX ADMIN — Medication 1 LOZENGE: at 19:31

## 2019-03-03 RX ADMIN — ONDANSETRON 8 MILLIGRAM(S): 8 TABLET, FILM COATED ORAL at 13:08

## 2019-03-03 RX ADMIN — Medication 100 MILLIGRAM(S): at 21:49

## 2019-03-03 RX ADMIN — ONDANSETRON 8 MILLIGRAM(S): 8 TABLET, FILM COATED ORAL at 06:07

## 2019-03-03 RX ADMIN — Medication 400 MILLIGRAM(S): at 06:06

## 2019-03-03 RX ADMIN — Medication 125 MILLIGRAM(S): at 17:15

## 2019-03-03 RX ADMIN — LEVETIRACETAM 500 MILLIGRAM(S): 250 TABLET, FILM COATED ORAL at 17:15

## 2019-03-03 RX ADMIN — Medication 10 MILLILITER(S): at 11:45

## 2019-03-03 RX ADMIN — HYDROMORPHONE HYDROCHLORIDE 1.5 MILLIGRAM(S): 2 INJECTION INTRAMUSCULAR; INTRAVENOUS; SUBCUTANEOUS at 23:15

## 2019-03-03 RX ADMIN — TACROLIMUS 1 MILLIGRAM(S): 5 CAPSULE ORAL at 06:07

## 2019-03-03 RX ADMIN — LORATADINE 10 MILLIGRAM(S): 10 TABLET ORAL at 11:46

## 2019-03-03 RX ADMIN — Medication 1 MILLIGRAM(S): at 11:46

## 2019-03-03 RX ADMIN — LOSARTAN POTASSIUM 100 MILLIGRAM(S): 100 TABLET, FILM COATED ORAL at 06:06

## 2019-03-03 RX ADMIN — Medication 5 MILLILITER(S): at 01:02

## 2019-03-03 NOTE — PROGRESS NOTE ADULT - SUBJECTIVE AND OBJECTIVE BOX
HPC Transplant Team                                                      Critical / Counseling Time Provided: 30 minutes                                                                                                                                                        Chief Complaint:     S: Patient seen and examined with HPC Transplant Team:   Denies mouth / tongue / throat pain, dyspnea, cough, nausea, vomiting, diarrhea, abdominal pain     O: Vitals:   Vital Signs Last 24 Hrs  T(C): 37.4 (03 Mar 2019 06:40), Max: 37.4 (03 Mar 2019 01:07)  T(F): 99.3 (03 Mar 2019 06:40), Max: 99.3 (03 Mar 2019 01:07)  HR: 90 (03 Mar 2019 06:40) (90 - 96)  BP: 138/83 (03 Mar 2019 06:40) (120/71 - 146/86)  BP(mean): --  RR: 18 (03 Mar 2019 06:40) (18 - 18)  SpO2: 100% (03 Mar 2019 06:40) (98% - 100%)    Admit weight:   Daily     Daily Weight in k.4 (02 Mar 2019 09:05)    Intake / Output:   - @ 07:01  -  03-03 @ 07:00  --------------------------------------------------------  IN: 1751.7 mL / OUT: 1750 mL / NET: 1.7 mL          PE:   Oropharynx:   Oral Mucositis:                                                        Grade:   CVS:   Lungs:   Abdomen:  Extremities:   Gastric Mucositis:                                                  Grade:   Intestinal Mucositis:                                              Grade:   Skin:   TLC:   Neuro:   Pain:     Labs:   CBC Full  -  ( 02 Mar 2019 06:37 )  WBC Count : 8.7 K/uL  Hemoglobin : 8.4 g/dL  Hematocrit : 23.4 %  Platelet Count - Automated : 23 K/uL  Mean Cell Volume : 102.0 fl  Mean Cell Hemoglobin : 36.4 pg  Mean Cell Hemoglobin Concentration : 35.7 gm/dL  Auto Neutrophil # : 6.1 K/uL  Auto Lymphocyte # : See Note  Auto Monocyte # : 2.2 K/uL  Auto Eosinophil # : 0.0 K/uL  Auto Basophil # : 0.0 K/uL  Auto Neutrophil % : 72.0 %  Auto Lymphocyte % : x  Auto Monocyte % : 21.0 %  Auto Eosinophil % : x  Auto Basophil % : x                          8.4    8.7   )-----------( 23       ( 02 Mar 2019 06:37 )             23.4     -    142  |  106  |  10  ----------------------------<  97  4.2   |  27  |  0.94    Ca    10.4      02 Mar 2019 06:37  Phos  3.3     03-  Mg     1.6         TPro  5.6<L>  /  Alb  3.3  /  TBili  0.1<L>  /  DBili  x   /  AST  21  /  ALT  17  /  AlkPhos  176<H>        LIVER FUNCTIONS - ( 02 Mar 2019 06:37 )  Alb: 3.3 g/dL / Pro: 5.6 g/dL / ALK PHOS: 176 U/L / ALT: 17 U/L / AST: 21 U/L / GGT: x                 @ 09:33  Tacrolimus 15.7                Cyclosporine --      Karnofsky / Lansky Scale:   GVHD:   Skin:   Liver:   Gut:   Overall Grade:       Cultures:         Radiology:       Meds:   Antimicrobials:   acyclovir   Oral Tab/Cap 400 milliGRAM(s) Oral every 8 hours  clotrimazole Lozenge 1 Lozenge Oral five times a day  fluconAZOLE   Tablet 400 milliGRAM(s) Oral daily  vancomycin    Solution 125 milliGRAM(s) Oral every 6 hours      Heme / Onc:   heparin  Infusion 464 Unit(s)/Hr IV Continuous <Continuous>      GI:  docusate sodium 100 milliGRAM(s) Oral three times a day PRN  pantoprazole    Tablet 40 milliGRAM(s) Oral before breakfast  polyethylene glycol 3350 17 Gram(s) Oral daily  senna 1 Tablet(s) Oral at bedtime PRN  sodium bicarbonate Mouth Rinse 10 milliLiter(s) Swish and Spit five times a day  ursodiol Capsule 300 milliGRAM(s) Oral two times a day with meals      Cardiovascular:   amLODIPine   Tablet 5 milliGRAM(s) Oral daily  losartan 100 milliGRAM(s) Oral daily      Immunologic:   immune   globulin 10% (GAMMAGARD) IVPB 20 Gram(s) IV Intermittent <User Schedule>  mycophenolate mofetil 1000 milliGRAM(s) Oral <User Schedule>  tacrolimus 1 milliGRAM(s) Oral every 12 hours      Other medications:   acetaminophen   Tablet .. 650 milliGRAM(s) Oral every 6 hours  acetaminophen   Tablet .. 650 milliGRAM(s) Oral <User Schedule>  benzonatate 100 milliGRAM(s) Oral every 8 hours  Biotene Dry Mouth Oral Rinse 5 milliLiter(s) Swish and Spit five times a day  diphenhydrAMINE   Injectable 25 milliGRAM(s) IV Push every 3 weeks  folic acid 1 milliGRAM(s) Oral daily  levETIRAcetam 500 milliGRAM(s) Oral two times a day  lidocaine/prilocaine Cream 1 Application(s) Topical daily  loratadine 10 milliGRAM(s) Oral daily  magnesium oxide 400 milliGRAM(s) Oral three times a day with meals  multivitamin 1 Tablet(s) Oral daily  nystatin Powder 1 Application(s) Topical every 8 hours  ondansetron Injectable 8 milliGRAM(s) IV Push every 8 hours  sodium chloride 0.9%. 1000 milliLiter(s) IV Continuous <Continuous>      PRN:   acetaminophen   Tablet .. 650 milliGRAM(s) Oral every 6 hours PRN  acetaminophen   Tablet .. 650 milliGRAM(s) Oral every 6 hours PRN  diphenhydrAMINE   Injectable 25 milliGRAM(s) IV Push every 4 hours PRN  docusate sodium 100 milliGRAM(s) Oral three times a day PRN  FIRST- Mouthwash  BLM 5 milliLiter(s) Swish and Swallow every 3 hours PRN  HYDROmorphone  Injectable 1.5 milliGRAM(s) IV Push every 3 hours PRN  LORazepam   Injectable 0.5 milliGRAM(s) IV Push every 6 hours PRN  metoclopramide Injectable 10 milliGRAM(s) IV Push every 6 hours PRN  oxyCODONE    IR 5 milliGRAM(s) Oral every 6 hours PRN  senna 1 Tablet(s) Oral at bedtime PRN      A/P:   ___ year old ___  with a history of ______________________  Pre / Status Post :  Autologous / Allogeneic PBSCT / BMT day ____________    1. Infectious Disease:   Fluconazole, Acyclovir     2. VOD Prophylaxis: Actigall, Glutamine, Heparin (dosed at 100 units / kg / day)     3. GI Prophylaxis:  Protonix    4. Mouthcare - NS / NaHCO3 rinses, Mycelex, Caphosol, skin care     5. GVHD prophylaxis     6. Transfuse & replete electrolytes prn     7. IV hydration, daily weights, strict I&O, prn diuresis     8. PO intake as tolerated, nutrition follow up as needed, MVI, folic acid     9. Antiemetics, anti-diarrhea medications:   Reglan, Ativan    10. OOB as tolerated, physical therapy consult if needed     11. Monitor coags / fibrinogen 2x week, vitamin K as needed     12. Monitor closely for clinical changes, monitor for fevers     13. Emotional support provided, plan of care discussed with patient and family, questions addressed     14. Patient education done regarding chemotherapy prep, plan of care, restrictions and discharge planning     15. Continue regular social work input     I have written the above note for Dr. Logan who performed service with me in the room.   Cedric Hernadez  NP-C ([phone])    I have seen and examined patient with NP, I agree with above note as scribed. 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      S: Patient seen and examined with HPC Transplant Team:   + bone pain  improved  + rare cough  + chronic low back pain   + BLE  pain 7/10, s/p Zarxio  no nausea today    Denies mouth / tongue / throat pain, nausea, vomiting, dyspnea, abdominal pain      O: Vitals:   Vital Signs Last 24 Hrs  T(C): 37.4 (03 Mar 2019 06:40), Max: 37.4 (03 Mar 2019 01:07)  T(F): 99.3 (03 Mar 2019 06:40), Max: 99.3 (03 Mar 2019 01:07)  HR: 90 (03 Mar 2019 06:40) (90 - 96)  BP: 138/83 (03 Mar 2019 06:40) (120/71 - 146/86)  BP(mean): --  RR: 18 (03 Mar 2019 06:40) (18 - 18)  SpO2: 100% (03 Mar 2019 06:40) (98% - 100%)    Admit weight:   Daily     Daily Weight in k.4 (02 Mar 2019 09:05)    Intake / Output:   - @ 07:01  -  -03 @ 07:00  --------------------------------------------------------  IN: 1751.7 mL / OUT: 1750 mL / NET: 1.7 mL          PE:   Oropharynx: no erythema or ulcers; resolving blood blister on the lower lip   Oral Mucositis: n/a                                                       stGstrstastdstest:st st1st CVS: S1S2, RRR  Lungs: CTA throughout bilaterally   Abdomen: soft, NT/ND, normoactive BS x 4Q  Extremities: no edema  Gastric Mucositis: n/a                                                 stGstrstastdstest:st st1st Intestinal Mucositis: n/a                                             stGstrstastdstest:st st1st Skin: no rash  TLC: C/D/I  Neuro: A&O x 3   Pain: chronic back pain/ bone pain 7/10    Labs:   CBC Full  -  ( 02 Mar 2019 06:37 )  WBC Count : 8.7 K/uL  Hemoglobin : 8.4 g/dL  Hematocrit : 23.4 %  Platelet Count - Automated : 23 K/uL  Mean Cell Volume : 102.0 fl  Mean Cell Hemoglobin : 36.4 pg  Mean Cell Hemoglobin Concentration : 35.7 gm/dL  Auto Neutrophil # : 6.1 K/uL  Auto Lymphocyte # : See Note  Auto Monocyte # : 2.2 K/uL  Auto Eosinophil # : 0.0 K/uL  Auto Basophil # : 0.0 K/uL  Auto Neutrophil % : 72.0 %  Auto Lymphocyte % : x  Auto Monocyte % : 21.0 %  Auto Eosinophil % : x  Auto Basophil % : x                          8.4    8.7   )-----------( 23       ( 02 Mar 2019 06:37 )             23.4     -    142  |  106  |  10  ----------------------------<  97  4.2   |  27  |  0.94    Ca    10.4      02 Mar 2019 06:37  Phos  3.3     03-  Mg     1.6         TPro  5.6<L>  /  Alb  3.3  /  TBili  0.1<L>  /  DBili  x   /  AST  21  /  ALT  17  /  AlkPhos  176<H>        LIVER FUNCTIONS - ( 02 Mar 2019 06:37 )  Alb: 3.3 g/dL / Pro: 5.6 g/dL / ALK PHOS: 176 U/L / ALT: 17 U/L / AST: 21 U/L / GGT: x                 @ 09:33  Tacrolimus 15.7                Cyclosporine --      Karnofsky / Lansky Scale:   GVHD:   Skin:   Liver:   Gut:   Overall Grade:       Cultures:         Radiology:       Meds:   Antimicrobials:   acyclovir   Oral Tab/Cap 400 milliGRAM(s) Oral every 8 hours  clotrimazole Lozenge 1 Lozenge Oral five times a day  fluconAZOLE   Tablet 400 milliGRAM(s) Oral daily  vancomycin    Solution 125 milliGRAM(s) Oral every 6 hours      Heme / Onc:   heparin  Infusion 464 Unit(s)/Hr IV Continuous <Continuous>      GI:  docusate sodium 100 milliGRAM(s) Oral three times a day PRN  pantoprazole    Tablet 40 milliGRAM(s) Oral before breakfast  polyethylene glycol 3350 17 Gram(s) Oral daily  senna 1 Tablet(s) Oral at bedtime PRN  sodium bicarbonate Mouth Rinse 10 milliLiter(s) Swish and Spit five times a day  ursodiol Capsule 300 milliGRAM(s) Oral two times a day with meals      Cardiovascular:   amLODIPine   Tablet 5 milliGRAM(s) Oral daily  losartan 100 milliGRAM(s) Oral daily      Immunologic:   immune   globulin 10% (GAMMAGARD) IVPB 20 Gram(s) IV Intermittent <User Schedule>  mycophenolate mofetil 1000 milliGRAM(s) Oral <User Schedule>  tacrolimus 1 milliGRAM(s) Oral every 12 hours      Other medications:   acetaminophen   Tablet .. 650 milliGRAM(s) Oral every 6 hours  acetaminophen   Tablet .. 650 milliGRAM(s) Oral <User Schedule>  benzonatate 100 milliGRAM(s) Oral every 8 hours  Biotene Dry Mouth Oral Rinse 5 milliLiter(s) Swish and Spit five times a day  diphenhydrAMINE   Injectable 25 milliGRAM(s) IV Push every 3 weeks  folic acid 1 milliGRAM(s) Oral daily  levETIRAcetam 500 milliGRAM(s) Oral two times a day  lidocaine/prilocaine Cream 1 Application(s) Topical daily  loratadine 10 milliGRAM(s) Oral daily  magnesium oxide 400 milliGRAM(s) Oral three times a day with meals  multivitamin 1 Tablet(s) Oral daily  nystatin Powder 1 Application(s) Topical every 8 hours  ondansetron Injectable 8 milliGRAM(s) IV Push every 8 hours  sodium chloride 0.9%. 1000 milliLiter(s) IV Continuous <Continuous>      PRN:   acetaminophen   Tablet .. 650 milliGRAM(s) Oral every 6 hours PRN  acetaminophen   Tablet .. 650 milliGRAM(s) Oral every 6 hours PRN  diphenhydrAMINE   Injectable 25 milliGRAM(s) IV Push every 4 hours PRN  docusate sodium 100 milliGRAM(s) Oral three times a day PRN  FIRST- Mouthwash  BLM 5 milliLiter(s) Swish and Swallow every 3 hours PRN  HYDROmorphone  Injectable 1.5 milliGRAM(s) IV Push every 3 hours PRN  LORazepam   Injectable 0.5 milliGRAM(s) IV Push every 6 hours PRN  metoclopramide Injectable 10 milliGRAM(s) IV Push every 6 hours PRN  oxyCODONE    IR 5 milliGRAM(s) Oral every 6 hours PRN  senna 1 Tablet(s) Oral at bedtime PRN      A/P:   ___ year old ___  with a history of ______________________  Pre / Status Post :  Autologous / Allogeneic PBSCT / BMT day ____________    1. Infectious Disease:   Fluconazole, Acyclovir     2. VOD Prophylaxis: Actigall, Glutamine, Heparin (dosed at 100 units / kg / day)     3. GI Prophylaxis:  Protonix    4. Mouthcare - NS / NaHCO3 rinses, Mycelex, Caphosol, skin care     5. GVHD prophylaxis     6. Transfuse & replete electrolytes prn     7. IV hydration, daily weights, strict I&O, prn diuresis     8. PO intake as tolerated, nutrition follow up as needed, MVI, folic acid     9. Antiemetics, anti-diarrhea medications:   Reglan, Ativan    10. OOB as tolerated, physical therapy consult if needed     11. Monitor coags / fibrinogen 2x week, vitamin K as needed     12. Monitor closely for clinical changes, monitor for fevers     13. Emotional support provided, plan of care discussed with patient and family, questions addressed     14. Patient education done regarding chemotherapy prep, plan of care, restrictions and discharge planning     15. Continue regular social work input     I have written the above note for Dr. Logan who performed service with me in the room.   Cedric Hernadez NP-C ([phone])    I have seen and examined patient with NP, I agree with above note as scribed. 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    S: Patient seen and examined with HPC Transplant Team:   Feels bettertoday  + bone pain  improved  + chronic low back pain   +Left knee pain  Light headed yesterday while ambulation  Cough resolved  Denies mouth, tongue,, throat pain, nausea, vomiting, diarrhea, dyspnea, abdominal pain      O: Vitals:   Vital Signs Last 24 Hrs  T(C): 37.4 (03 Mar 2019 06:40), Max: 37.4 (03 Mar 2019 01:07)  T(F): 99.3 (03 Mar 2019 06:40), Max: 99.3 (03 Mar 2019 01:07)  HR: 90 (03 Mar 2019 06:40) (90 - 96)  BP: 138/83 (03 Mar 2019 06:40) (120/71 - 146/86)  BP(mean): --  RR: 18 (03 Mar 2019 06:40) (18 - 18)  SpO2: 100% (03 Mar 2019 06:40) (98% - 100%)    Admit weight:   Daily     Daily Weight in k.4 (02 Mar 2019 09:05)    Intake / Output:   - @ 07:01  -  -03 @ 07:00  --------------------------------------------------------  IN: 1751.7 mL / OUT: 1750 mL / NET: 1.7 mL          PE:   Oropharynx: no erythema or ulcers; resolving blood blister on the lower lip   Oral Mucositis: n/a                                                       stGstrstastdstest:st st1st CVS: S1S2, RRR  Lungs: CTA throughout bilaterally   Abdomen: soft, NT/ND, normoactive BS x 4Q  Extremities: no edema  Gastric Mucositis: n/a                                                 stGstrstastdstest:st st1st Intestinal Mucositis: n/a                                             stGstrstastdstest:st st1st Skin: no rash  TLC: C/D/I  Neuro: A&O x 3   Pain: chronic back pain/ left knee pain 6/10    Labs:   CBC Full  -  ( 02 Mar 2019 06:37 )  WBC Count : 8.7 K/uL  Hemoglobin : 8.4 g/dL  Hematocrit : 23.4 %  Platelet Count - Automated : 23 K/uL  Mean Cell Volume : 102.0 fl  Mean Cell Hemoglobin : 36.4 pg  Mean Cell Hemoglobin Concentration : 35.7 gm/dL  Auto Neutrophil # : 6.1 K/uL  Auto Lymphocyte # : See Note  Auto Monocyte # : 2.2 K/uL  Auto Eosinophil # : 0.0 K/uL  Auto Basophil # : 0.0 K/uL  Auto Neutrophil % : 72.0 %  Auto Lymphocyte % : x  Auto Monocyte % : 21.0 %  Auto Eosinophil % : x  Auto Basophil % : x                          8.4    8.7   )-----------( 23       ( 02 Mar 2019 06:37 )             23.4         142  |  106  |  10  ----------------------------<  97  4.2   |  27  |  0.94    Ca    10.4      02 Mar 2019 06:37  Phos  3.3     03-  Mg     1.6         TPro  5.6<L>  /  Alb  3.3  /  TBili  0.1<L>  /  DBili  x   /  AST  21  /  ALT  17  /  AlkPhos  176<H>        LIVER FUNCTIONS - ( 02 Mar 2019 06:37 )  Alb: 3.3 g/dL / Pro: 5.6 g/dL / ALK PHOS: 176 U/L / ALT: 17 U/L / AST: 21 U/L / GGT: x                 @ 09:33  Tacrolimus 15.7                Cyclosporine --      Karnofsky / Lansky Scale:   GVHD:   Skin:   Liver:   Gut:   Overall Grade:       Cultures:         Radiology:       Meds:   Antimicrobials:   acyclovir   Oral Tab/Cap 400 milliGRAM(s) Oral every 8 hours  clotrimazole Lozenge 1 Lozenge Oral five times a day  fluconAZOLE   Tablet 400 milliGRAM(s) Oral daily  vancomycin    Solution 125 milliGRAM(s) Oral every 6 hours      Heme / Onc:   heparin  Infusion 464 Unit(s)/Hr IV Continuous <Continuous>      GI:  docusate sodium 100 milliGRAM(s) Oral three times a day PRN  pantoprazole    Tablet 40 milliGRAM(s) Oral before breakfast  polyethylene glycol 3350 17 Gram(s) Oral daily  senna 1 Tablet(s) Oral at bedtime PRN  sodium bicarbonate Mouth Rinse 10 milliLiter(s) Swish and Spit five times a day  ursodiol Capsule 300 milliGRAM(s) Oral two times a day with meals      Cardiovascular:   amLODIPine   Tablet 5 milliGRAM(s) Oral daily  losartan 100 milliGRAM(s) Oral daily      Immunologic:   immune   globulin 10% (GAMMAGARD) IVPB 20 Gram(s) IV Intermittent <User Schedule>  mycophenolate mofetil 1000 milliGRAM(s) Oral <User Schedule>  tacrolimus 1 milliGRAM(s) Oral every 12 hours      Other medications:   acetaminophen   Tablet .. 650 milliGRAM(s) Oral every 6 hours  acetaminophen   Tablet .. 650 milliGRAM(s) Oral <User Schedule>  benzonatate 100 milliGRAM(s) Oral every 8 hours  Biotene Dry Mouth Oral Rinse 5 milliLiter(s) Swish and Spit five times a day  diphenhydrAMINE   Injectable 25 milliGRAM(s) IV Push every 3 weeks  folic acid 1 milliGRAM(s) Oral daily  levETIRAcetam 500 milliGRAM(s) Oral two times a day  lidocaine/prilocaine Cream 1 Application(s) Topical daily  loratadine 10 milliGRAM(s) Oral daily  magnesium oxide 400 milliGRAM(s) Oral three times a day with meals  multivitamin 1 Tablet(s) Oral daily  nystatin Powder 1 Application(s) Topical every 8 hours  ondansetron Injectable 8 milliGRAM(s) IV Push every 8 hours  sodium chloride 0.9%. 1000 milliLiter(s) IV Continuous <Continuous>      PRN:   acetaminophen   Tablet .. 650 milliGRAM(s) Oral every 6 hours PRN  acetaminophen   Tablet .. 650 milliGRAM(s) Oral every 6 hours PRN  diphenhydrAMINE   Injectable 25 milliGRAM(s) IV Push every 4 hours PRN  docusate sodium 100 milliGRAM(s) Oral three times a day PRN  FIRST- Mouthwash  BLM 5 milliLiter(s) Swish and Swallow every 3 hours PRN  HYDROmorphone  Injectable 1.5 milliGRAM(s) IV Push every 3 hours PRN  LORazepam   Injectable 0.5 milliGRAM(s) IV Push every 6 hours PRN  metoclopramide Injectable 10 milliGRAM(s) IV Push every 6 hours PRN  oxyCODONE    IR 5 milliGRAM(s) Oral every 6 hours PRN  senna 1 Tablet(s) Oral at bedtime PRN      A/P:   ___ year old ___  with a history of ______________________  Pre / Status Post :  Autologous / Allogeneic PBSCT / BMT day ____________    1. Infectious Disease:   Fluconazole, Acyclovir     2. VOD Prophylaxis: Actigall, Glutamine, Heparin (dosed at 100 units / kg / day)     3. GI Prophylaxis:  Protonix    4. Mouthcare - NS / NaHCO3 rinses, Mycelex, Caphosol, skin care     5. GVHD prophylaxis     6. Transfuse & replete electrolytes prn     7. IV hydration, daily weights, strict I&O, prn diuresis     8. PO intake as tolerated, nutrition follow up as needed, MVI, folic acid     9. Antiemetics, anti-diarrhea medications:   Reglan, Ativan    10. OOB as tolerated, physical therapy consult if needed     11. Monitor coags / fibrinogen 2x week, vitamin K as needed     12. Monitor closely for clinical changes, monitor for fevers     13. Emotional support provided, plan of care discussed with patient and family, questions addressed     14. Patient education done regarding chemotherapy prep, plan of care, restrictions and discharge planning     15. Continue regular social work input     I have written the above note for Dr. Logan who performed service with me in the room.   Cedric Hernadez NP-C ([phone])    I have seen and examined patient with NP, I agree with above note as scribed. 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    S: Patient seen and examined with HPC Transplant Team:   Feels better today  + bone pain  improved  + chronic low back pain   +Left knee pain  Light headed yesterday while ambulating, resolved today  Cough resolved  Denies mouth, tongue, throat pain, nausea, vomiting, diarrhea, dyspnea, abdominal pain      O: Vitals:   Vital Signs Last 24 Hrs  T(C): 37.4 (03 Mar 2019 06:40), Max: 37.4 (03 Mar 2019 01:07)  T(F): 99.3 (03 Mar 2019 06:40), Max: 99.3 (03 Mar 2019 01:07)  HR: 90 (03 Mar 2019 06:40) (90 - 96)  BP: 138/83 (03 Mar 2019 06:40) (120/71 - 146/86)  BP(mean): --  RR: 18 (03 Mar 2019 06:40) (18 - 18)  SpO2: 100% (03 Mar 2019 06:40) (98% - 100%)    Admit weight:   Daily     Daily Weight in k.4 (02 Mar 2019 09:05)    Intake / Output:   - @ 07:01  -  -03 @ 07:00  --------------------------------------------------------  IN: 1751.7 mL / OUT: 1750 mL / NET: 1.7 mL          PE:   Oropharynx: no erythema or ulcers; resolving blood blister on the lower lip   Oral Mucositis: n/a                                                       stGstrstastdstest:st st1st CVS: S1S2, RRR  Lungs: CTA throughout bilaterally   Abdomen: soft, NT/ND, normoactive BS x 4Q  Extremities: no edema  Gastric Mucositis: n/a                                                 stGstrstastdstest:st st1st Intestinal Mucositis: n/a                                             stGstrstastdstest:st st1st Skin: no rash  TLC: C/D/I  Neuro: A&O x 3   Pain: chronic back pain/ left knee pain 6/10    Labs:   CBC Full  -  ( 02 Mar 2019 06:37 )  WBC Count : 8.7 K/uL  Hemoglobin : 8.4 g/dL  Hematocrit : 23.4 %  Platelet Count - Automated : 23 K/uL  Mean Cell Volume : 102.0 fl  Mean Cell Hemoglobin : 36.4 pg  Mean Cell Hemoglobin Concentration : 35.7 gm/dL  Auto Neutrophil # : 6.1 K/uL  Auto Lymphocyte # : See Note  Auto Monocyte # : 2.2 K/uL  Auto Eosinophil # : 0.0 K/uL  Auto Basophil # : 0.0 K/uL  Auto Neutrophil % : 72.0 %  Auto Lymphocyte % : x  Auto Monocyte % : 21.0 %  Auto Eosinophil % : x  Auto Basophil % : x                          8.4    8.7   )-----------( 23       ( 02 Mar 2019 06:37 )             23.4     03-    142  |  106  |  10  ----------------------------<  97  4.2   |  27  |  0.94    Ca    10.4      02 Mar 2019 06:37  Phos  3.3     03-  Mg     1.6         TPro  5.6<L>  /  Alb  3.3  /  TBili  0.1<L>  /  DBili  x   /  AST  21  /  ALT  17  /  AlkPhos  176<H>        LIVER FUNCTIONS - ( 02 Mar 2019 06:37 )  Alb: 3.3 g/dL / Pro: 5.6 g/dL / ALK PHOS: 176 U/L / ALT: 17 U/L / AST: 21 U/L / GGT: x                 @ 09:33  Tacrolimus 15.7                Cyclosporine --      Karnofsky / Lansky Scale:   GVHD:   Skin:   Liver:   Gut:   Overall Grade:       Cultures:         Radiology:       Meds:   Antimicrobials:   acyclovir   Oral Tab/Cap 400 milliGRAM(s) Oral every 8 hours  clotrimazole Lozenge 1 Lozenge Oral five times a day  fluconAZOLE   Tablet 400 milliGRAM(s) Oral daily  vancomycin    Solution 125 milliGRAM(s) Oral every 6 hours      Heme / Onc:   heparin  Infusion 464 Unit(s)/Hr IV Continuous <Continuous>      GI:  docusate sodium 100 milliGRAM(s) Oral three times a day PRN  pantoprazole    Tablet 40 milliGRAM(s) Oral before breakfast  polyethylene glycol 3350 17 Gram(s) Oral daily  senna 1 Tablet(s) Oral at bedtime PRN  sodium bicarbonate Mouth Rinse 10 milliLiter(s) Swish and Spit five times a day  ursodiol Capsule 300 milliGRAM(s) Oral two times a day with meals      Cardiovascular:   amLODIPine   Tablet 5 milliGRAM(s) Oral daily  losartan 100 milliGRAM(s) Oral daily      Immunologic:   immune   globulin 10% (GAMMAGARD) IVPB 20 Gram(s) IV Intermittent <User Schedule>  mycophenolate mofetil 1000 milliGRAM(s) Oral <User Schedule>  tacrolimus 1 milliGRAM(s) Oral every 12 hours      Other medications:   acetaminophen   Tablet .. 650 milliGRAM(s) Oral every 6 hours  acetaminophen   Tablet .. 650 milliGRAM(s) Oral <User Schedule>  benzonatate 100 milliGRAM(s) Oral every 8 hours  Biotene Dry Mouth Oral Rinse 5 milliLiter(s) Swish and Spit five times a day  diphenhydrAMINE   Injectable 25 milliGRAM(s) IV Push every 3 weeks  folic acid 1 milliGRAM(s) Oral daily  levETIRAcetam 500 milliGRAM(s) Oral two times a day  lidocaine/prilocaine Cream 1 Application(s) Topical daily  loratadine 10 milliGRAM(s) Oral daily  magnesium oxide 400 milliGRAM(s) Oral three times a day with meals  multivitamin 1 Tablet(s) Oral daily  nystatin Powder 1 Application(s) Topical every 8 hours  ondansetron Injectable 8 milliGRAM(s) IV Push every 8 hours  sodium chloride 0.9%. 1000 milliLiter(s) IV Continuous <Continuous>      PRN:   acetaminophen   Tablet .. 650 milliGRAM(s) Oral every 6 hours PRN  acetaminophen   Tablet .. 650 milliGRAM(s) Oral every 6 hours PRN  diphenhydrAMINE   Injectable 25 milliGRAM(s) IV Push every 4 hours PRN  docusate sodium 100 milliGRAM(s) Oral three times a day PRN  FIRST- Mouthwash  BLM 5 milliLiter(s) Swish and Swallow every 3 hours PRN  HYDROmorphone  Injectable 1.5 milliGRAM(s) IV Push every 3 hours PRN  LORazepam   Injectable 0.5 milliGRAM(s) IV Push every 6 hours PRN  metoclopramide Injectable 10 milliGRAM(s) IV Push every 6 hours PRN  oxyCODONE    IR 5 milliGRAM(s) Oral every 6 hours PRN  senna 1 Tablet(s) Oral at bedtime PRN      A/P:   ___ year old ___  with a history of ______________________  Pre / Status Post :  Autologous / Allogeneic PBSCT / BMT day ____________    1. Infectious Disease:   Fluconazole, Acyclovir     2. VOD Prophylaxis: Actigall, Glutamine, Heparin (dosed at 100 units / kg / day)     3. GI Prophylaxis:  Protonix    4. Mouthcare - NS / NaHCO3 rinses, Mycelex, Caphosol, skin care     5. GVHD prophylaxis     6. Transfuse & replete electrolytes prn     7. IV hydration, daily weights, strict I&O, prn diuresis     8. PO intake as tolerated, nutrition follow up as needed, MVI, folic acid     9. Antiemetics, anti-diarrhea medications:   Reglan, Ativan    10. OOB as tolerated, physical therapy consult if needed     11. Monitor coags / fibrinogen 2x week, vitamin K as needed     12. Monitor closely for clinical changes, monitor for fevers     13. Emotional support provided, plan of care discussed with patient and family, questions addressed     14. Patient education done regarding chemotherapy prep, plan of care, restrictions and discharge planning     15. Continue regular social work input     I have written the above note for Dr. Logan who performed service with me in the room.   Cedric Hernadez  NP-C ([phone])    I have seen and examined patient with NP, I agree with above note as scribed. 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    S: Patient seen and examined with HPC Transplant Team:   Feels better today  + bone pain  improved  + chronic low back pain   +Left knee pain  Light headed yesterday while ambulating, resolved today  Cough resolved  Denies mouth, tongue, throat pain, nausea, vomiting, diarrhea, dyspnea, abdominal pain    O: Vitals:   Vital Signs Last 24 Hrs  T(C): 37.4 (03 Mar 2019 06:40), Max: 37.4 (03 Mar 2019 01:07)  T(F): 99.3 (03 Mar 2019 06:40), Max: 99.3 (03 Mar 2019 01:07)  HR: 90 (03 Mar 2019 06:40) (90 - 96)  BP: 138/83 (03 Mar 2019 06:40) (120/71 - 146/86)  BP(mean): --  RR: 18 (03 Mar 2019 06:40) (18 - 18)  SpO2: 100% (03 Mar 2019 06:40) (98% - 100%)    Admit weight: 114.4kg  Daily Weight in k.4 (03 Mar 2019 09:05)    Intake / Output:   - @ 07:01  -  -03 @ 07:00  --------------------------------------------------------  IN: 1751.7 mL / OUT: 1750 mL / NET: 1.7 mL    PE:   Oropharynx: no erythema or ulcers  Oral Mucositis: n/a                                                       stGstrstastdstest:st st1st CVS: S1S2, RRR  Lungs: CTA throughout bilaterally   Abdomen: soft, NT/ND, normoactive BS x 4Q  Extremities: no edema  Gastric Mucositis: n/a                                                 stGstrstastdstest:st st1st Intestinal Mucositis: n/a                                             stGstrstastdstest:st st1st Skin: no rash  TLC: C/D/I  Neuro: A&O x 3   Pain: chronic back pain/ left knee pain 6/10              LABS:                          8.5    7.5   )-----------( 22       ( 03 Mar 2019 07:08 )             24.4         Mean Cell Volume : 101.0 fl  Mean Cell Hemoglobin : 35.1 pg  Mean Cell Hemoglobin Concentration : 34.8 gm/dL  Auto Neutrophil # : 4.7 K/uL  Auto Lymphocyte # : 0.3 K/uL  Auto Monocyte # : 2.5 K/uL  Auto Eosinophil # : 0.0 K/uL  Auto Basophil # : 0.0 K/uL  Auto Neutrophil % : 54.0 %  Auto Lymphocyte % : 1.0 %  Auto Monocyte % : 32.0 %  Auto Eosinophil % : 0.0 %  Auto Basophil % : 0.0 %    03-03    142  |  105  |  11  ----------------------------<  92  4.4   |  26  |  0.89    Ca    10.1      03 Mar 2019 07:04  Phos  3.1     03-03  Mg     1.4     03-03    TPro  5.7<L>  /  Alb  3.1<L>  /  TBili  0.1<L>  /  DBili  x   /  AST  28  /  ALT  25  /  AlkPhos  197<H>  03-03    Mg 1.4  Phos 3.1     03-02 @ 09:33  Tacrolimus 15.7                Cyclosporine --    Radiology:    Xray Chest 1 View- PORTABLE-Urgent (19 @ 09:12) >  1.  The lungs are clear.    Meds:   Antimicrobials:   acyclovir   Oral Tab/Cap 400 milliGRAM(s) Oral every 8 hours  clotrimazole Lozenge 1 Lozenge Oral five times a day  fluconAZOLE   Tablet 400 milliGRAM(s) Oral daily  vancomycin    Solution 125 milliGRAM(s) Oral every 6 hours      Heme / Onc:   heparin  Infusion 464 Unit(s)/Hr IV Continuous <Continuous>      GI:  docusate sodium 100 milliGRAM(s) Oral three times a day PRN  pantoprazole    Tablet 40 milliGRAM(s) Oral before breakfast  polyethylene glycol 3350 17 Gram(s) Oral daily  senna 1 Tablet(s) Oral at bedtime PRN  sodium bicarbonate Mouth Rinse 10 milliLiter(s) Swish and Spit five times a day  ursodiol Capsule 300 milliGRAM(s) Oral two times a day with meals      Cardiovascular:   amLODIPine   Tablet 5 milliGRAM(s) Oral daily  losartan 100 milliGRAM(s) Oral daily      Immunologic:   immune   globulin 10% (GAMMAGARD) IVPB 20 Gram(s) IV Intermittent <User Schedule>  mycophenolate mofetil 1000 milliGRAM(s) Oral <User Schedule>  tacrolimus 1 milliGRAM(s) Oral every 12 hours      Other medications:   acetaminophen   Tablet .. 650 milliGRAM(s) Oral every 6 hours  acetaminophen   Tablet .. 650 milliGRAM(s) Oral <User Schedule>  benzonatate 100 milliGRAM(s) Oral every 8 hours  Biotene Dry Mouth Oral Rinse 5 milliLiter(s) Swish and Spit five times a day  diphenhydrAMINE   Injectable 25 milliGRAM(s) IV Push every 3 weeks  folic acid 1 milliGRAM(s) Oral daily  levETIRAcetam 500 milliGRAM(s) Oral two times a day  lidocaine/prilocaine Cream 1 Application(s) Topical daily  loratadine 10 milliGRAM(s) Oral daily  magnesium oxide 400 milliGRAM(s) Oral three times a day with meals  multivitamin 1 Tablet(s) Oral daily  nystatin Powder 1 Application(s) Topical every 8 hours  ondansetron Injectable 8 milliGRAM(s) IV Push every 8 hours  sodium chloride 0.9%. 1000 milliLiter(s) IV Continuous <Continuous>      PRN:   acetaminophen   Tablet .. 650 milliGRAM(s) Oral every 6 hours PRN  acetaminophen   Tablet .. 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 diff - started vancomycin 125mg po q hours   Platelet refractory - platelets dispensed as 1/2 units to be given over 3 hours q 12 hours   increased back/bone pain with nausea- increased Dilaudid, added Ativan prn and Emend 80mg x 3 days  Engrafted off  Zarxio and Cefepime.   level 9.8 today, continue on  Tacro 1mg PO BID  check daily levels  Discharge planning for 3/4    1. Infectious Disease:   acyclovir   Oral Tab/Cap 400 milliGRAM(s) Oral every 8 hours  clotrimazole Lozenge 1 Lozenge Oral five times a day  fluconAZOLE   Tablet 400 milliGRAM(s) Oral daily  vancomycin    Solution 125 milliGRAM(s) Oral every 6 hours    2. VOD Prophylaxis: Actigall, Glutamine, Heparin (dosed at 100 units / kg / day)     3. GI Prophylaxis:    pantoprazole    Tablet 40 milliGRAM(s) Oral before breakfast    4. Mouth care - NS / NaHCO3 rinses, Mycelex, Biotene; Skin care     5. GVHD prophylaxis   mycophenolate mofetil 1000 milliGRAM(s) Oral <User Schedule>  tacrolimus 1.5 milliGRAM(s) Oral every 12 hours    6. Transfuse & replete electrolytes prn   magnesium oxide 400 milliGRAM(s) Oral three times a day with meals    7. IV hydration, daily weights, strict I&O, prn diuresis     8. PO intake as tolerated, nutrition follow up as needed, MVI, folic acid     9. Antiemetics, anti-diarrhea medications:   metoclopramide Injectable 10 milliGRAM(s) IV Push every 6 hours PRN  ondansetron Injectable 8 milliGRAM(s) IV Push every 8 hours    10. OOB as tolerated, physical therapy consult if needed     11. Monitor coags / fibrinogen 2x week, vitamin K as needed     12. Monitor closely for clinical changes, monitor for fevers     13. Emotional support provided, plan of care discussed and questions addressed     14. Patient education done regarding plan of care, restrictions and discharge planning     15. Continue regular social work input     I have written the above note for Dr. Logan who performed service with me in the room.   Cedric Hernadez  NP-C ([phone])    I have seen and examined patient with NP, I agree with above note as scribed.

## 2019-03-03 NOTE — PROGRESS NOTE ADULT - ATTENDING COMMENTS
58 year old female with PMH [place] chromosome positive ALL diagnosed in 2016 s/p R HyperCVAD X 4 cycles, IT MTX X 2, s/p autologous stem cell transplantation (12/16/16),  who presented in October 2018 with subdural hemorrhage and relapsed disease confirmed by peripheral blood flow cytometry treated with Inotuzumab X 2 cycles.  Bone marrow biopsy on 1/23/19 showed remission with FISH and PCR for BCR-ABL translocation negative on the BM specimen but peripheral blood testing on 1/31 showed .004% BCR-ABL transcript with negative FISH suggesting MRD positivity.  Patient was on Ponatinib, DC 1/31/19. LFT's improved off drug.  Now in CR2    Patient was admitted for haploidentical stem cell transplantation with fludarabine/cytoxan/TBI conditioning. She is s/p HPC transplant, day + 17  Completed high-dose Cytoxan on days +3, +4(GVHD prophylaxis); then begin Tacrolimus, Cellcept on day +5.  Begin Zarxio on day +5  + WBC engraftment- discontinued Zarxio. Monitor daily CBC with diff. Monitor for aGVHD.  Neutropenic fevers resolved, s/p haplo storm - on Cefepime, culture from 2/14, 2/16 negative.  CXR negative.  On Acyclovir, Fluconazole prophylaxis.  Neutropenia now resolved with engraftment and growth factor support- D/C'd Cefepime and Zarxio  C. Diff diarrhea- continue oral Vancomycin, diarrhea resolved  Monitor I/O's/weights- lasix as needed  VOD prophylaxis as per protocol.  Mouth care, skin care  Antiemetics as needed    History of SDH, continue Keppra. CT scan baseline 2/7/19 negative for SDH.   History of HTN continue Losartan.  OOB/ambulate 58 year old female with PMH [place] chromosome positive ALL diagnosed in 2016 s/p R HyperCVAD X 4 cycles, IT MTX X 2, s/p autologous stem cell transplantation (12/16/16),  who presented in October 2018 with subdural hemorrhage and relapsed disease confirmed by peripheral blood flow cytometry treated with Inotuzumab X 2 cycles.  Bone marrow biopsy on 1/23/19 showed remission with FISH and PCR for BCR-ABL translocation negative on the BM specimen but peripheral blood testing on 1/31 showed .004% BCR-ABL transcript with negative FISH suggesting MRD positivity.  Patient was on Ponatinib, DC 1/31/19. LFT's improved off drug.  Now in CR2    Patient was admitted for haploidentical stem cell transplantation with fludarabine/cytoxan/TBI conditioning. She is s/p HPC transplant, day + 18  Completed high-dose Cytoxan on days +3, +4(GVHD prophylaxis); then begin Tacrolimus, Cellcept on day +5. Check repeat Tacro level today  Begin Zarxio on day +5  + WBC engraftment- discontinued Zarxio. Monitor daily CBC with diff. Monitor for aGVHD- no evidence today  Neutropenic fevers resolved, s/p haplo storm - on Cefepime, culture from 2/14, 2/16 negative.  CXR negative.  On Acyclovir, Fluconazole prophylaxis.  Neutropenia now resolved with engraftment and growth factor support- D/C'd Cefepime and Zarxio  C. Diff diarrhea- continue oral Vancomycin, diarrhea resolved  Monitor I/O's/weights- lasix as needed  VOD prophylaxis as per protocol.  Mouth care, skin care  Antiemetics as needed    History of SDH, continue Keppra. CT scan baseline 2/7/19 negative for SDH.   History of HTN continue Losartan.  OOB/ambulate  Discharge planning

## 2019-03-04 ENCOUNTER — TRANSCRIPTION ENCOUNTER (OUTPATIENT)
Age: 59
End: 2019-03-04

## 2019-03-04 VITALS
RESPIRATION RATE: 18 BRPM | TEMPERATURE: 100 F | OXYGEN SATURATION: 98 % | SYSTOLIC BLOOD PRESSURE: 151 MMHG | DIASTOLIC BLOOD PRESSURE: 93 MMHG | HEART RATE: 100 BPM

## 2019-03-04 LAB
ALBUMIN SERPL ELPH-MCNC: 3.1 G/DL — LOW (ref 3.3–5)
ALP SERPL-CCNC: 197 U/L — HIGH (ref 40–120)
ALT FLD-CCNC: 29 U/L — SIGNIFICANT CHANGE UP (ref 10–45)
ANION GAP SERPL CALC-SCNC: 10 MMOL/L — SIGNIFICANT CHANGE UP (ref 5–17)
ANISOCYTOSIS BLD QL: SLIGHT — SIGNIFICANT CHANGE UP
APTT BLD: 63 SEC — HIGH (ref 27.5–36.3)
AST SERPL-CCNC: 34 U/L — SIGNIFICANT CHANGE UP (ref 10–40)
BASOPHILS # BLD AUTO: 0 K/UL — SIGNIFICANT CHANGE UP (ref 0–0.2)
BASOPHILS NFR BLD AUTO: 0 % — SIGNIFICANT CHANGE UP (ref 0–2)
BILIRUB DIRECT SERPL-MCNC: <0.1 MG/DL — SIGNIFICANT CHANGE UP (ref 0–0.2)
BILIRUB INDIRECT FLD-MCNC: >0 MG/DL — LOW (ref 0.2–1)
BILIRUB SERPL-MCNC: 0.1 MG/DL — LOW (ref 0.2–1.2)
BLASTS # FLD: 1 % — HIGH (ref 0–0)
BLD GP AB SCN SERPL QL: NEGATIVE — SIGNIFICANT CHANGE UP
BUN SERPL-MCNC: 11 MG/DL — SIGNIFICANT CHANGE UP (ref 7–23)
CALCIUM SERPL-MCNC: 10.3 MG/DL — SIGNIFICANT CHANGE UP (ref 8.4–10.5)
CHLORIDE SERPL-SCNC: 105 MMOL/L — SIGNIFICANT CHANGE UP (ref 96–108)
CHROM ANALY INTERPHASE BLD FISH-IMP: SIGNIFICANT CHANGE UP
CMV DNA CSF QL NAA+PROBE: SIGNIFICANT CHANGE UP
CMV DNA SPEC NAA+PROBE-LOG#: SIGNIFICANT CHANGE UP LOGIU/ML
CO2 SERPL-SCNC: 26 MMOL/L — SIGNIFICANT CHANGE UP (ref 22–31)
CREAT SERPL-MCNC: 0.86 MG/DL — SIGNIFICANT CHANGE UP (ref 0.5–1.3)
DACRYOCYTES BLD QL SMEAR: SLIGHT — SIGNIFICANT CHANGE UP
DOHLE BOD BLD QL SMEAR: PRESENT — SIGNIFICANT CHANGE UP
EOSINOPHIL # BLD AUTO: 0 K/UL — SIGNIFICANT CHANGE UP (ref 0–0.5)
EOSINOPHIL NFR BLD AUTO: 0 % — SIGNIFICANT CHANGE UP (ref 0–6)
FIBRINOGEN PPP-MCNC: 590 MG/DL — HIGH (ref 350–510)
GLUCOSE SERPL-MCNC: 93 MG/DL — SIGNIFICANT CHANGE UP (ref 70–99)
HCT VFR BLD CALC: 24.3 % — LOW (ref 34.5–45)
HGB BLD-MCNC: 8.3 G/DL — LOW (ref 11.5–15.5)
HYPOCHROMIA BLD QL: SLIGHT — SIGNIFICANT CHANGE UP
INR BLD: 0.93 RATIO — SIGNIFICANT CHANGE UP (ref 0.88–1.16)
LDH SERPL L TO P-CCNC: 276 U/L — HIGH (ref 50–242)
LYMPHOCYTES # BLD AUTO: 0.4 K/UL — LOW (ref 1–3.3)
LYMPHOCYTES # BLD AUTO: 1 % — LOW (ref 13–44)
MAGNESIUM SERPL-MCNC: 1.7 MG/DL — SIGNIFICANT CHANGE UP (ref 1.6–2.6)
MCHC RBC-ENTMCNC: 34.3 GM/DL — SIGNIFICANT CHANGE UP (ref 32–36)
MCHC RBC-ENTMCNC: 35.2 PG — HIGH (ref 27–34)
MCV RBC AUTO: 103 FL — HIGH (ref 80–100)
METAMYELOCYTES # FLD: 5 % — HIGH (ref 0–0)
MONOCYTES # BLD AUTO: 2.8 K/UL — HIGH (ref 0–0.9)
MONOCYTES NFR BLD AUTO: 29 % — HIGH (ref 2–14)
MYELOCYTES NFR BLD: 1 % — HIGH (ref 0–0)
NEUTROPHILS # BLD AUTO: 4.2 K/UL — SIGNIFICANT CHANGE UP (ref 1.8–7.4)
NEUTROPHILS NFR BLD AUTO: 53 % — SIGNIFICANT CHANGE UP (ref 43–77)
NEUTS BAND # BLD: 10 % — HIGH (ref 0–8)
NRBC # BLD: 1 /100 — HIGH (ref 0–0)
OVALOCYTES BLD QL SMEAR: SLIGHT — SIGNIFICANT CHANGE UP
PHOSPHATE SERPL-MCNC: 3.3 MG/DL — SIGNIFICANT CHANGE UP (ref 2.5–4.5)
PLAT MORPH BLD: NORMAL — SIGNIFICANT CHANGE UP
PLATELET # BLD AUTO: 18 K/UL — CRITICAL LOW (ref 150–400)
POIKILOCYTOSIS BLD QL AUTO: SLIGHT — SIGNIFICANT CHANGE UP
POLYCHROMASIA BLD QL SMEAR: SLIGHT — SIGNIFICANT CHANGE UP
POTASSIUM SERPL-MCNC: 4.5 MMOL/L — SIGNIFICANT CHANGE UP (ref 3.5–5.3)
POTASSIUM SERPL-SCNC: 4.5 MMOL/L — SIGNIFICANT CHANGE UP (ref 3.5–5.3)
PROT SERPL-MCNC: 5.5 G/DL — LOW (ref 6–8.3)
PROTHROM AB SERPL-ACNC: 10.6 SEC — SIGNIFICANT CHANGE UP (ref 10–12.9)
RBC # BLD: 2.36 M/UL — LOW (ref 3.8–5.2)
RBC # FLD: 13.8 % — SIGNIFICANT CHANGE UP (ref 10.3–14.5)
RBC BLD AUTO: ABNORMAL
RH IG SCN BLD-IMP: POSITIVE — SIGNIFICANT CHANGE UP
SODIUM SERPL-SCNC: 141 MMOL/L — SIGNIFICANT CHANGE UP (ref 135–145)
TACROLIMUS SERPL-MCNC: 9.2 NG/ML — SIGNIFICANT CHANGE UP
WBC # BLD: 7.4 K/UL — SIGNIFICANT CHANGE UP (ref 3.8–10.5)
WBC # FLD AUTO: 7.4 K/UL — SIGNIFICANT CHANGE UP (ref 3.8–10.5)

## 2019-03-04 PROCEDURE — 85045 AUTOMATED RETICULOCYTE COUNT: CPT

## 2019-03-04 PROCEDURE — 85027 COMPLETE CBC AUTOMATED: CPT

## 2019-03-04 PROCEDURE — 88271 CYTOGENETICS DNA PROBE: CPT

## 2019-03-04 PROCEDURE — 36430 TRANSFUSION BLD/BLD COMPNT: CPT

## 2019-03-04 PROCEDURE — 99238 HOSP IP/OBS DSCHRG MGMT 30/<: CPT

## 2019-03-04 PROCEDURE — 85610 PROTHROMBIN TIME: CPT

## 2019-03-04 PROCEDURE — 36556 INSERT NON-TUNNEL CV CATH: CPT

## 2019-03-04 PROCEDURE — C1769: CPT

## 2019-03-04 PROCEDURE — 87040 BLOOD CULTURE FOR BACTERIA: CPT

## 2019-03-04 PROCEDURE — 71045 X-RAY EXAM CHEST 1 VIEW: CPT

## 2019-03-04 PROCEDURE — 83615 LACTATE (LD) (LDH) ENZYME: CPT

## 2019-03-04 PROCEDURE — 87581 M.PNEUMON DNA AMP PROBE: CPT

## 2019-03-04 PROCEDURE — 80053 COMPREHEN METABOLIC PANEL: CPT

## 2019-03-04 PROCEDURE — 70450 CT HEAD/BRAIN W/O DYE: CPT

## 2019-03-04 PROCEDURE — P9040: CPT

## 2019-03-04 PROCEDURE — P9047: CPT

## 2019-03-04 PROCEDURE — 87324 CLOSTRIDIUM AG IA: CPT

## 2019-03-04 PROCEDURE — 93005 ELECTROCARDIOGRAM TRACING: CPT

## 2019-03-04 PROCEDURE — 87449 NOS EACH ORGANISM AG IA: CPT

## 2019-03-04 PROCEDURE — 82248 BILIRUBIN DIRECT: CPT

## 2019-03-04 PROCEDURE — 87086 URINE CULTURE/COLONY COUNT: CPT

## 2019-03-04 PROCEDURE — 87486 CHLMYD PNEUM DNA AMP PROBE: CPT

## 2019-03-04 PROCEDURE — 81003 URINALYSIS AUTO W/O SCOPE: CPT

## 2019-03-04 PROCEDURE — P9011: CPT

## 2019-03-04 PROCEDURE — 87493 C DIFF AMPLIFIED PROBE: CPT

## 2019-03-04 PROCEDURE — 38207 CRYOPRESERVE STEM CELLS: CPT

## 2019-03-04 PROCEDURE — 85730 THROMBOPLASTIN TIME PARTIAL: CPT

## 2019-03-04 PROCEDURE — 82955 ASSAY OF G6PD ENZYME: CPT

## 2019-03-04 PROCEDURE — 81005 URINALYSIS: CPT

## 2019-03-04 PROCEDURE — 71046 X-RAY EXAM CHEST 2 VIEWS: CPT

## 2019-03-04 PROCEDURE — 86902 BLOOD TYPE ANTIGEN DONOR EA: CPT

## 2019-03-04 PROCEDURE — 83735 ASSAY OF MAGNESIUM: CPT

## 2019-03-04 PROCEDURE — 81001 URINALYSIS AUTO W/SCOPE: CPT

## 2019-03-04 PROCEDURE — 77001 FLUOROGUIDE FOR VEIN DEVICE: CPT

## 2019-03-04 PROCEDURE — 76937 US GUIDE VASCULAR ACCESS: CPT

## 2019-03-04 PROCEDURE — 84100 ASSAY OF PHOSPHORUS: CPT

## 2019-03-04 PROCEDURE — 86870 RBC ANTIBODY IDENTIFICATION: CPT

## 2019-03-04 PROCEDURE — 86803 HEPATITIS C AB TEST: CPT

## 2019-03-04 PROCEDURE — 36555 INSERT NON-TUNNEL CV CATH: CPT

## 2019-03-04 PROCEDURE — 80197 ASSAY OF TACROLIMUS: CPT

## 2019-03-04 PROCEDURE — P9037: CPT

## 2019-03-04 PROCEDURE — 86850 RBC ANTIBODY SCREEN: CPT

## 2019-03-04 PROCEDURE — 86901 BLOOD TYPING SEROLOGIC RH(D): CPT

## 2019-03-04 PROCEDURE — 86922 COMPATIBILITY TEST ANTIGLOB: CPT

## 2019-03-04 PROCEDURE — 85384 FIBRINOGEN ACTIVITY: CPT

## 2019-03-04 PROCEDURE — 82784 ASSAY IGA/IGD/IGG/IGM EACH: CPT

## 2019-03-04 PROCEDURE — 88275 CYTOGENETICS 100-300: CPT

## 2019-03-04 PROCEDURE — 82785 ASSAY OF IGE: CPT

## 2019-03-04 PROCEDURE — 87633 RESP VIRUS 12-25 TARGETS: CPT

## 2019-03-04 PROCEDURE — 86880 COOMBS TEST DIRECT: CPT

## 2019-03-04 PROCEDURE — 88237 TISSUE CULTURE BONE MARROW: CPT

## 2019-03-04 PROCEDURE — C1751: CPT

## 2019-03-04 PROCEDURE — C1894: CPT

## 2019-03-04 PROCEDURE — 86900 BLOOD TYPING SEROLOGIC ABO: CPT

## 2019-03-04 PROCEDURE — 87798 DETECT AGENT NOS DNA AMP: CPT

## 2019-03-04 RX ORDER — PONATINIB HYDROCHLORIDE 45 MG/1
45 TABLET, FILM COATED ORAL
Qty: 30 | Refills: 2 | Status: DISCONTINUED | COMMUNITY
Start: 2018-12-13 | End: 2019-03-04

## 2019-03-04 RX ORDER — OXYCODONE HYDROCHLORIDE 5 MG/1
5 TABLET ORAL
Qty: 20 | Refills: 0 | OUTPATIENT
Start: 2019-03-04 | End: 2019-03-08

## 2019-03-04 RX ORDER — OXYCODONE HYDROCHLORIDE 5 MG/1
5 TABLET ORAL ONCE
Qty: 0 | Refills: 0 | Status: DISCONTINUED | OUTPATIENT
Start: 2019-03-04 | End: 2019-03-04

## 2019-03-04 RX ORDER — OXYCODONE HYDROCHLORIDE 5 MG/1
1 TABLET ORAL
Qty: 20 | Refills: 0 | OUTPATIENT
Start: 2019-03-04 | End: 2019-03-08

## 2019-03-04 RX ORDER — OXYCODONE HYDROCHLORIDE 15 MG/1
15 TABLET ORAL
Qty: 30 | Refills: 0 | Status: DISCONTINUED | COMMUNITY
Start: 2018-12-06 | End: 2019-03-04

## 2019-03-04 RX ORDER — FILGRASTIM-SNDZ 300 UG/.5ML
300 INJECTION, SOLUTION INTRAVENOUS; SUBCUTANEOUS
Qty: 5 | Refills: 0 | Status: DISCONTINUED | COMMUNITY
Start: 2019-01-31 | End: 2019-03-04

## 2019-03-04 RX ORDER — ATOVAQUONE 750 MG/5ML
750 SUSPENSION ORAL EVERY 12 HOURS
Qty: 0 | Refills: 0 | Status: DISCONTINUED | OUTPATIENT
Start: 2019-03-04 | End: 2019-03-04

## 2019-03-04 RX ORDER — FILGRASTIM-SNDZ 480 UG/.8ML
480 INJECTION, SOLUTION INTRAVENOUS; SUBCUTANEOUS
Qty: 5 | Refills: 0 | Status: DISCONTINUED | COMMUNITY
Start: 2019-01-31 | End: 2019-03-04

## 2019-03-04 RX ADMIN — OXYCODONE HYDROCHLORIDE 5 MILLIGRAM(S): 5 TABLET ORAL at 14:25

## 2019-03-04 RX ADMIN — ATOVAQUONE 750 MILLIGRAM(S): 750 SUSPENSION ORAL at 17:16

## 2019-03-04 RX ADMIN — MAGNESIUM OXIDE 400 MG ORAL TABLET 400 MILLIGRAM(S): 241.3 TABLET ORAL at 12:02

## 2019-03-04 RX ADMIN — MAGNESIUM OXIDE 400 MG ORAL TABLET 400 MILLIGRAM(S): 241.3 TABLET ORAL at 17:20

## 2019-03-04 RX ADMIN — LEVETIRACETAM 500 MILLIGRAM(S): 250 TABLET, FILM COATED ORAL at 05:01

## 2019-03-04 RX ADMIN — LOSARTAN POTASSIUM 100 MILLIGRAM(S): 100 TABLET, FILM COATED ORAL at 05:01

## 2019-03-04 RX ADMIN — MYCOPHENOLATE MOFETIL 1000 MILLIGRAM(S): 250 CAPSULE ORAL at 05:01

## 2019-03-04 RX ADMIN — Medication 400 MILLIGRAM(S): at 05:02

## 2019-03-04 RX ADMIN — ONDANSETRON 8 MILLIGRAM(S): 8 TABLET, FILM COATED ORAL at 14:00

## 2019-03-04 RX ADMIN — HYDROMORPHONE HYDROCHLORIDE 1.5 MILLIGRAM(S): 2 INJECTION INTRAMUSCULAR; INTRAVENOUS; SUBCUTANEOUS at 05:25

## 2019-03-04 RX ADMIN — Medication 1 LOZENGE: at 07:33

## 2019-03-04 RX ADMIN — Medication 10 MILLILITER(S): at 11:21

## 2019-03-04 RX ADMIN — Medication 5 MILLILITER(S): at 15:48

## 2019-03-04 RX ADMIN — OXYCODONE HYDROCHLORIDE 5 MILLIGRAM(S): 5 TABLET ORAL at 13:55

## 2019-03-04 RX ADMIN — URSODIOL 300 MILLIGRAM(S): 250 TABLET, FILM COATED ORAL at 17:20

## 2019-03-04 RX ADMIN — Medication 1 LOZENGE: at 00:47

## 2019-03-04 RX ADMIN — Medication 1 MILLIGRAM(S): at 12:02

## 2019-03-04 RX ADMIN — Medication 5 MILLILITER(S): at 07:32

## 2019-03-04 RX ADMIN — Medication 650 MILLIGRAM(S): at 09:03

## 2019-03-04 RX ADMIN — TACROLIMUS 1 MILLIGRAM(S): 5 CAPSULE ORAL at 17:21

## 2019-03-04 RX ADMIN — Medication 25 MILLIGRAM(S): at 09:04

## 2019-03-04 RX ADMIN — Medication 10 MILLILITER(S): at 00:47

## 2019-03-04 RX ADMIN — Medication 10 MILLILITER(S): at 07:32

## 2019-03-04 RX ADMIN — Medication 5 MILLILITER(S): at 00:47

## 2019-03-04 RX ADMIN — MYCOPHENOLATE MOFETIL 1000 MILLIGRAM(S): 250 CAPSULE ORAL at 13:45

## 2019-03-04 RX ADMIN — Medication 5 MILLILITER(S): at 11:21

## 2019-03-04 RX ADMIN — OXYCODONE HYDROCHLORIDE 5 MILLIGRAM(S): 5 TABLET ORAL at 09:03

## 2019-03-04 RX ADMIN — OXYCODONE HYDROCHLORIDE 5 MILLIGRAM(S): 5 TABLET ORAL at 09:33

## 2019-03-04 RX ADMIN — HYDROMORPHONE HYDROCHLORIDE 1.5 MILLIGRAM(S): 2 INJECTION INTRAMUSCULAR; INTRAVENOUS; SUBCUTANEOUS at 05:34

## 2019-03-04 RX ADMIN — FLUCONAZOLE 400 MILLIGRAM(S): 150 TABLET ORAL at 12:02

## 2019-03-04 RX ADMIN — PANTOPRAZOLE SODIUM 40 MILLIGRAM(S): 20 TABLET, DELAYED RELEASE ORAL at 05:01

## 2019-03-04 RX ADMIN — URSODIOL 300 MILLIGRAM(S): 250 TABLET, FILM COATED ORAL at 07:32

## 2019-03-04 RX ADMIN — Medication 100 MILLIGRAM(S): at 05:01

## 2019-03-04 RX ADMIN — LEVETIRACETAM 500 MILLIGRAM(S): 250 TABLET, FILM COATED ORAL at 17:21

## 2019-03-04 RX ADMIN — Medication 125 MILLIGRAM(S): at 05:02

## 2019-03-04 RX ADMIN — NYSTATIN CREAM 1 APPLICATION(S): 100000 CREAM TOPICAL at 05:01

## 2019-03-04 RX ADMIN — Medication 1 LOZENGE: at 15:48

## 2019-03-04 RX ADMIN — Medication 125 MILLIGRAM(S): at 17:17

## 2019-03-04 RX ADMIN — TACROLIMUS 1 MILLIGRAM(S): 5 CAPSULE ORAL at 05:01

## 2019-03-04 RX ADMIN — Medication 400 MILLIGRAM(S): at 13:45

## 2019-03-04 RX ADMIN — LORATADINE 10 MILLIGRAM(S): 10 TABLET ORAL at 12:02

## 2019-03-04 RX ADMIN — Medication 100 MILLIGRAM(S): at 13:45

## 2019-03-04 RX ADMIN — MAGNESIUM OXIDE 400 MG ORAL TABLET 400 MILLIGRAM(S): 241.3 TABLET ORAL at 07:32

## 2019-03-04 RX ADMIN — Medication 10 MILLILITER(S): at 15:48

## 2019-03-04 RX ADMIN — Medication 1 LOZENGE: at 11:21

## 2019-03-04 RX ADMIN — NYSTATIN CREAM 1 APPLICATION(S): 100000 CREAM TOPICAL at 13:46

## 2019-03-04 RX ADMIN — ONDANSETRON 8 MILLIGRAM(S): 8 TABLET, FILM COATED ORAL at 05:02

## 2019-03-04 RX ADMIN — Medication 125 MILLIGRAM(S): at 12:02

## 2019-03-04 RX ADMIN — Medication 125 MILLIGRAM(S): at 00:47

## 2019-03-04 RX ADMIN — AMLODIPINE BESYLATE 5 MILLIGRAM(S): 2.5 TABLET ORAL at 05:01

## 2019-03-04 RX ADMIN — Medication 1 TABLET(S): at 12:02

## 2019-03-04 NOTE — DISCHARGE NOTE ADULT - MEDICATION SUMMARY - MEDICATIONS TO STOP TAKING
I will STOP taking the medications listed below when I get home from the hospital:    oxyCODONE 15 mg oral tablet  -- 1 tab(s) by mouth every 3 hours, As needed, Moderate Pain (4 - 6) or severe pain 7-10 MDD:8 tabs    fentaNYL 25 mcg/hr transdermal film, extended release  -- 1 patch by transdermal patch every 72 hours MDD:1 patch every 3 days    fentaNYL 12 mcg/hr transdermal film, extended release  -- 1 patch by transdermal patch every 72 hours MDD:1 patch every 3 days

## 2019-03-04 NOTE — PROVIDER CONTACT NOTE (CRITICAL VALUE NOTIFICATION) - ASSESSMENT
aol x4, vs stable and afebrile, no bleeding
AOL X4, vs stable and afebrile, no bleeding
aol x 4, vs stable and afebrile, no bleeding, no SOB
aol x 4, vs stable ands afebrile, no bleeding
asymptomatic
asymptomatic
stable/ afebrile
vss, afebrile, no s/s of active bleeding

## 2019-03-04 NOTE — DISCHARGE NOTE ADULT - PLAN OF CARE
maintain graft 1. Diet and activities as per [facility] discharge guidelines and safe food handling guidelines. NO RESTAURANT OR TAKE OUT FOOD AT THIS TIME, ONLY HOME COOKED PREPARED/FROZEN FOODS. You are allowed to have fresh baked pizza right out of the oven. This is the ONLY takeout food at this time.  2. Notify your physician if bleeding; swelling; persistent nausea and vomiting; unable to urinate; pain not relieved by medications; fever; numbness, tingling; excessive diarrhea, inability to tolerate liquids or foods; increased irritability or sluggishness, rash  3.   On the day you have an appointment at the [facility], do NOT take your Tacrolimus morning dose. Instead, bring it with you to the [facility]. After you have your bloods drawn you may take your morning dose of Tacrolimus. remain free of infection 1. Continue your prophylactic medications as directed by Dr. Bentley   Fluconazole - antifungal prophylaxis   Acyclovir - antiviral prophylaxis   Mepron - PCP prophylaxis   Protonix - GI prophylaxis   Actigall - VOD (SOS) prophylaxis

## 2019-03-04 NOTE — PROVIDER CONTACT NOTE (CRITICAL VALUE NOTIFICATION) - NAME OF MD/NP/PA/DO NOTIFIED:
QUAN Camarillo
QUAN Knight
QUAN العراقي
QUAN العراقي
flaca pepper
jeanie GLASS
joana middleton
joana pepper
shasta fu
flaca pepper

## 2019-03-04 NOTE — DISCHARGE NOTE ADULT - PATIENT PORTAL LINK FT
You can access the [facility] Portal, offered by [facility], by registering with the following website: [email or web address]

## 2019-03-04 NOTE — DISCHARGE NOTE ADULT - COMMUNITY RESOURCES
[facility] Transportation([phone]) for follow up appt. on Thursday, 3/7/19 at 1pm via [employer].  at 11:45am. Will call for return trip. Confirmation #[id number]

## 2019-03-04 NOTE — DISCHARGE NOTE ADULT - HOME CARE AGENCY
[facility]([phone]) for visiting nurse with start of care for tomorrow, 3/5/19. [facility](#[phone]) for visiting nurse with start of care for tomorrow, 3/5/19.

## 2019-03-04 NOTE — DISCHARGE NOTE ADULT - HOSPITAL COURSE
57 y/o F w/ PMH Ph +  ALL,  s/p R HyperCVAD X 4 cycles, IT MTX X 2, s/p autologous pbsct (12/16/16) complicated by shingles infection, who presented in October 2018 with subdural hemorrhage and relapsed disease confirmed by peripheral blood flow cytometry treated with Inotuzumab X 2 cycles with course complicated by refractory thrombocytopenia requiring HLA patched platelets, pneumonia, coag negative staph bacteremia treated with vancomycin, who now presents for haploidentical allogeneic stem cell transplantation with fludarabine/cytoxan/TBI conditioning followed by GVHD prophylaxis with Cytoxan, cellcept X 30 days and tacrolimus X 180 days. The donor is her son Ponce. Patient's most recent testing suggest MRD positivity with + BCR-ABL transcript but BM biopsy morphologically normal on 1/23/19. Ms. 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 Her transplant course was complicated by shingles. In October, 2018 she relapsed (confirmed via flow cytometry). She was treated with inotuzumab x 2 cycles. Her course was complicated by subdural hemorrhage, refractory thrombocytopenia requiring HLA matched platelets, pneumonia, and coag negative staph bacteremia (treated with vancomycin). She is now admitted for a haplo-identical peripheral blood stem cell transplant from her son.     Upon admission, a TLC was placed in IR. Ms. Galeas received IV hydration, pain management, antiemetics, nutritional support, antiviral / antibacterial / antifungal / PCP / GI / VOD (SOS) prophylaxis. Labs were monitored on a daily basis, and she received electrolyte repletion and transfusional support as needed.     Ms. Galesa had a relatively uncomplicated transplant course. A baseline CT of the head was done on admission given her history of SDH and refractory thrombocytopenia. The baseline CT was negative. Ms. Galeas did experience pancytopenia related to the high dose chemotherapy preparative regimen. Her thrombocytopenia was refractory, and was managed by infusing 1/2 unit of platelets twice daily over 3 hours. Ms. Galeas also experienced neutropenic fevers. When she became neutropenic, she was started on prophylactic ciprofloxacin. When she developed fevers, blood and urine cultures were sent, a CXR completed and the ciprofloxacin was changed to cefepime. Her cultures and CXR remained negative.     On 2/13/19, Ms. Galeas received 280ml of fresh, mobilized, haplo-identical, HPC apheresis over approximately 1 hour. Cell counts as follows:   Total MNCs (x 10^8/kg) = 4.36  CD34+ cells (x 10^6/kg) = 7.78  Cell Viability (%) = 100%    Engraftment was noted on 2/27/19. Post engraftment, the cefepime and zarxio were discontinued. Tacrolimus levels and CMV PCR were monitored twice weekly. A FISH for Y was sent to determine chimerism, with results pending.     Currently, Ms. Galeas is stable for discharge home with outpatient follow up at the [facility].

## 2019-03-04 NOTE — PROVIDER CONTACT NOTE (CRITICAL VALUE NOTIFICATION) - BACKGROUND
AML/  s/p day +18 stem cell transplant. Pt in being discharged today
day + 7 for haplo transplant for relapsed ALL
day +10 haplo transplant for relapsed ALL
day +6 haplo tranplant for relapsed ALL
patient 14 days post transplant
s/p stem cell
s/p stemcell
day +11 haplo transplant for relapsed ALL

## 2019-03-04 NOTE — DISCHARGE NOTE ADULT - CARE PLAN
Principal Discharge DX:	Stem cells transplant status  Goal:	maintain graft  Assessment and plan of treatment:	1. Diet and activities as per [facility] discharge guidelines and safe food handling guidelines. NO RESTAURANT OR TAKE OUT FOOD AT THIS TIME, ONLY HOME COOKED PREPARED/FROZEN FOODS. You are allowed to have fresh baked pizza right out of the oven. This is the ONLY takeout food at this time.  2. Notify your physician if bleeding; swelling; persistent nausea and vomiting; unable to urinate; pain not relieved by medications; fever; numbness, tingling; excessive diarrhea, inability to tolerate liquids or foods; increased irritability or sluggishness, rash  3.   On the day you have an appointment at the [facility], do NOT take your Tacrolimus morning dose. Instead, bring it with you to the [facility]. After you have your bloods drawn you may take your morning dose of Tacrolimus.  Secondary Diagnosis:	Need for prophylactic measure  Goal:	remain free of infection  Assessment and plan of treatment:	1. Continue your prophylactic medications as directed by Dr. Bentley   Fluconazole - antifungal prophylaxis   Acyclovir - antiviral prophylaxis   Mepron - PCP prophylaxis   Protonix - GI prophylaxis   Actigall - VOD (SOS) prophylaxis

## 2019-03-04 NOTE — PROVIDER CONTACT NOTE (CRITICAL VALUE NOTIFICATION) - RECOMMENDATIONS
transfuse plts with pre meds
1 u plt
1/2u plt q12
continue to monitor
notify NP. Platelet transfusion recommended due to pt going home
transfuse 1 unit of plts with pre meds
transfuse plts with pre meds
transfuse plts with pre meds

## 2019-03-04 NOTE — PROVIDER CONTACT NOTE (CRITICAL VALUE NOTIFICATION) - PERSON GIVING RESULT:
Audie Ortiz
Audie Ortiz
Gail Weighl
cintia callahan
mohan
mohan
morenita recio
morenita recio
renae
ORLIN Busby

## 2019-03-04 NOTE — DISCHARGE NOTE ADULT - MEDICATION SUMMARY - MEDICATIONS TO TAKE
I will START or STAY ON the medications listed below when I get home from the hospital:    oxyCODONE 5 mg oral tablet  -- 1 tab(s) by mouth every 6 hours, As Needed MDD:4 tabs   -- Caution federal law prohibits the transfer of this drug to any person other  than the person for whom it was prescribed.  It is very important that you take or use this exactly as directed.  Do not skip doses or discontinue unless directed by your doctor.  May cause drowsiness.  Alcohol may intensify this effect.  Use care when operating dangerous machinery.  This prescription cannot be refilled.  Using more of this medication than prescribed may cause serious breathing problems.    -- Indication: For As needed for pain     Cozaar 100 mg oral tablet  -- 1 tab(s) by mouth once a day  -- Indication: For HTN    levETIRAcetam 500 mg oral tablet  -- 1 tab(s) by mouth 2 times a day  -- Indication: For Anti-seizure medication     ondansetron 8 mg oral tablet  -- 1 tab(s) by mouth 3 times a day, As Needed  -- Indication: For As needed for nausea     metoclopramide 10 mg oral tablet  -- 1 tab(s) by mouth 4 times a day (before meals and at bedtime), As Needed  -- Indication: For As needed for nausea     fluconazole 200 mg oral tablet  -- 2 tab(s) by mouth once a day  -- Indication: For Antifungal prophylaxis     acyclovir 400 mg oral tablet  -- 1 tab(s) by mouth every 8 hours  -- Indication: For Antiviral prophylaxis     amLODIPine 5 mg oral tablet  -- 1 tab(s) by mouth once a day  -- Indication: For HTN    Actigall 300 mg oral capsule  -- 1 cap(s) by mouth 2 times a day  -- Indication: For VOD (SOS) prophylaxis     vancomycin 125 mg oral capsule  -- 1 cap(s) by mouth every 6 hours  -- Indication: For c. difficile     mycophenolate mofetil 250 mg oral capsule  -- 1000 milligram(s) by mouth every 8 hours  -- Indication: For immune suppression     tacrolimus 1 mg oral capsule  -- 2 cap(s) by mouth every 12 hours  -- Indication: For immune suppression - note your total daily dose at this time is 1mg by mouth twice per day     tacrolimus 0.5 mg oral capsule  -- 2 cap(s) by mouth 2 times a day  -- Indication: For immune suppression - note your total daily dose at this time is 1mg by mouth twice per day     magnesium oxide 400 mg (241.3 mg elemental magnesium) oral tablet  -- 1 tab(s) by mouth 3 times a day (with meals)  -- Indication: For Supplement    atovaquone 750 mg/5 mL oral suspension  -- 5 milliliter(s) by mouth 2 times a day  -- Indication: For PCP prophylaxis     pantoprazole 40 mg oral delayed release tablet  -- 1 tab(s) by mouth once a day (before a meal)  -- Indication: For GI prophylaxis     Multiple Vitamins oral tablet  -- 1 tab(s) by mouth once a day  -- Indication: For Supplement     folic acid 1 mg oral tablet  -- 1 tab(s) by mouth once a day  -- Indication: For Supplement

## 2019-03-04 NOTE — PROGRESS NOTE ADULT - NSHPATTENDINGPLANDISCUSS_GEN_ALL_CORE
team

## 2019-03-04 NOTE — CHART NOTE - NSCHARTNOTEFT_GEN_A_CORE
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 Reports mouth was dry before, now improved. Reports nausea improved, cough resolved. Reports BMs regular. Reviewed food safety guidelines once home.    Daily Weight: 2/7: 245.5->2/26: 246.6->3/3: 247.7 pounds, stable at this time     Pertinent Medications: MEDICATIONS  (STANDING):  acetaminophen   Tablet .. 650 milliGRAM(s) Oral every 6 hours  acetaminophen   Tablet .. 650 milliGRAM(s) Oral <User Schedule>  acyclovir   Oral Tab/Cap 400 milliGRAM(s) Oral every 8 hours  amLODIPine   Tablet 5 milliGRAM(s) Oral daily  atovaquone Suspension 750 milliGRAM(s) Oral every 12 hours  benzonatate 100 milliGRAM(s) Oral every 8 hours  Biotene Dry Mouth Oral Rinse 5 milliLiter(s) Swish and Spit five times a day  clotrimazole Lozenge 1 Lozenge Oral five times a day  diphenhydrAMINE   Injectable 25 milliGRAM(s) IV Push every 3 weeks  fluconAZOLE   Tablet 400 milliGRAM(s) Oral daily  folic acid 1 milliGRAM(s) Oral daily  immune   globulin 10% (GAMMAGARD) IVPB 20 Gram(s) IV Intermittent <User Schedule>  levETIRAcetam 500 milliGRAM(s) Oral two times a day  lidocaine/prilocaine Cream 1 Application(s) Topical daily  loratadine 10 milliGRAM(s) Oral daily  losartan 100 milliGRAM(s) Oral daily  magnesium oxide 400 milliGRAM(s) Oral three times a day with meals  multivitamin 1 Tablet(s) Oral daily  mycophenolate mofetil 1000 milliGRAM(s) Oral <User Schedule>  nystatin Powder 1 Application(s) Topical every 8 hours  ondansetron Injectable 8 milliGRAM(s) IV Push every 8 hours  pantoprazole    Tablet 40 milliGRAM(s) Oral before breakfast  polyethylene glycol 3350 17 Gram(s) Oral daily  sodium bicarbonate Mouth Rinse 10 milliLiter(s) Swish and Spit five times a day  sodium chloride 0.9%. 1000 milliLiter(s) (20 mL/Hr) IV Continuous <Continuous>  tacrolimus 1 milliGRAM(s) Oral every 12 hours  ursodiol Capsule 300 milliGRAM(s) Oral two times a day with meals  vancomycin    Solution 125 milliGRAM(s) Oral every 6 hours    MEDICATIONS  (PRN):  acetaminophen   Tablet .. 650 milliGRAM(s) Oral every 6 hours PRN Temp greater or equal to 38C (100.4F), Mild Pain (1 - 3)  acetaminophen   Tablet .. 650 milliGRAM(s) Oral every 6 hours PRN Temp greater or equal to 38C (100.4F)  diphenhydrAMINE   Injectable 25 milliGRAM(s) IV Push every 4 hours PRN Allergy symptoms  docusate sodium 100 milliGRAM(s) Oral three times a day PRN Constipation  FIRST- Mouthwash  BLM 5 milliLiter(s) Swish and Swallow every 3 hours PRN Mouth Care  LORazepam   Injectable 0.5 milliGRAM(s) IV Push every 6 hours PRN Nausea and/or Vomiting  metoclopramide Injectable 10 milliGRAM(s) IV Push every 6 hours PRN nausea  oxyCODONE    IR 5 milliGRAM(s) Oral every 6 hours PRN Moderate Pain (4 - 6)  senna 1 Tablet(s) Oral at bedtime PRN Constipation    Pertinent Labs: 03-04 @ 06:52: Na 141, BUN 11, Cr 0.86, BG 93, K+ 4.5, Phos 3.3, Mg 1.7, Alk Phos 197<H>, ALT/SGPT 29, AST/SGOT 34, HbA1c --    Finger Sticks: None pertinent to address at this time.       Skin per nursing documentation: intact  Edema: no edema at this time     Estimated Needs:   [x] no change since previous assessment      Previous Nutrition Diagnosis: Predicted suboptimal energy intake   Nutrition Diagnosis continues, addressed c good PO intake     New Nutrition Diagnosis: none at this time       Recommend  1) Continue c current diet.   2) Reviewed transplant food safety precautions and answered all questions as able.   3) Encouraged PO intake-small, frequent meals and nutrient dense snacks. In house, encouraged pt to order nutrient dense snacks c meals to consume in between meals to mimic small, frequent meals.     Monitoring and Evaluation:     Continue to monitor Nutritional intake, Tolerance to diet prescription, weights, labs, skin integrity    RD remains available upon request and will follow up per protocol  Elaine Noland, MS, RD, , [facility] #[phone]

## 2019-03-04 NOTE — PROVIDER CONTACT NOTE (CRITICAL VALUE NOTIFICATION) - ACTION/TREATMENT ORDERED:
transfuse plts with pre meds
Transfuse 1 bag platelets.
monitor for s/s bleeding.
1 u plt
1/2u plt
NP aware and notified. One unit platelet transfusion ordered. Will monitor for s/s of bleeding
continue to monitor
transfuse 1 unit of plts with pre meds
transfuse plts with pre meds
transfuse plts with pre meds

## 2019-03-04 NOTE — PROGRESS NOTE ADULT - SUBJECTIVE AND OBJECTIVE BOX
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    S: Patient seen and examined with HPC Transplant Team:     Denies mouth / tongue / throat pain, dyspnea, cough, nausea, vomiting, diarrhea, abdominal pain     O: Vitals:   Vital Signs Last 24 Hrs  T(C): 36.6 (04 Mar 2019 05:32), Max: 37.1 (03 Mar 2019 10:00)  T(F): 97.8 (04 Mar 2019 05:32), Max: 98.8 (03 Mar 2019 10:00)  HR: 94 (04 Mar 2019 05:32) (84 - 99)  BP: 138/86 (04 Mar 2019 05:32) (129/85 - 148/86)  BP(mean): --  RR: 18 (04 Mar 2019 05:32) (18 - 18)  SpO2: 97% (04 Mar 2019 05:32) (95% - 98%)    Admit weight: 114.4kg  Daily Weight in k.4 (03 Mar 2019 09:58)    Intake / Output:    @ 07: @ 07:00  --------------------------------------------------------  IN: 1651.8 mL / OUT: 1775 mL / NET: -123.2 mL     @ 07:01   @ 08:35  --------------------------------------------------------  IN: 59.2 mL / OUT: 0 mL / NET: 59.2 mL      PE:   Oropharynx: no erythema or ulcers  Oral Mucositis: n/a                                                       stGstrstastdstest:st st1st CVS: S1S2, RRR  Lungs: CTA throughout bilaterally   Abdomen: soft, NT/ND, normoactive BS x 4Q  Extremities: no edema  Gastric Mucositis: n/a                                                 stGstrstastdstest:st st1st Intestinal Mucositis: n/a                                             stGstrstastdstest:st st1st Skin: no rash  TLC: C/D/I  Neuro: A&O x 3   Pain: chronic back pain/ left knee pain 6/10      Labs:   CBC Full  -  ( 04 Mar 2019 06:52 )  WBC Count : 7.4 K/uL  Hemoglobin : 8.3 g/dL  Hematocrit : 24.3 %  Platelet Count - Automated : 18 K/uL  Mean Cell Volume : 103.0 fl  Mean Cell Hemoglobin : 35.2 pg  Mean Cell Hemoglobin Concentration : 34.3 gm/dL  Auto Neutrophil # : x  Auto Lymphocyte # : x  Auto Monocyte # : x  Auto Eosinophil # : x  Auto Basophil # : x  Auto Neutrophil % : x  Auto Lymphocyte % : x  Auto Monocyte % : x  Auto Eosinophil % : x  Auto Basophil % : x                          8.3    7.4   )-----------( 18       ( 04 Mar 2019 06:52 )             24.3     -    141  |  105  |  11  ----------------------------<  93  4.5   |  26  |  0.86    Ca    10.3      04 Mar 2019 06:52  Phos  3.3     -  Mg     1.7         TPro  5.5<L>  /  Alb  3.1<L>  /  TBili  0.1<L>  /  DBili  <0.1  /  AST  34  /  ALT  29  /  AlkPhos  197<H>      PT/INR - ( 04 Mar 2019 06:52 )   PT: 10.6 sec;   INR: 0.93 ratio         PTT - ( 04 Mar 2019 06:52 )  PTT:63.0 sec  LIVER FUNCTIONS - ( 04 Mar 2019 06:52 )  Alb: 3.1 g/dL / Pro: 5.5 g/dL / ALK PHOS: 197 U/L / ALT: 29 U/L / AST: 34 U/L / GGT: x           Lactate Dehydrogenase, Serum: 276 U/L ( @ 06:52)       - @ 09:45  Tacrolimus 9.8                Cyclosporine --    Cultures:   Culture Results: urine  <10,000 CFU/ml Normal Urogenital everton present (19 @ 18:41)    Culture Results: blood  No growth at 5 days. (19 @ 18:30)    Culture Results: blood  No growth at 5 days. (19 @ 18:30)    Radiology:   EXAM:  XR CHEST PORTABLE URGENT 1V                        PROCEDURE DATE:  2019    IMPRESSION:  1.  The lungs are clear.    EXAM:  CT BRAIN                        PROCEDURE DATE:  2019    IMPRESSION:  No acute intracranial abnormality is noted. No subdural collections are   visualized. If the patient has new and persistent symptoms, consider   follow-up brain MRI with and without contrast, if there are no MRI or   contrast contraindications.  New moderate mucosal thickening involves the paranasal sinuses. Correlate   for possible sinusitis      Meds:   Antimicrobials:   acyclovir   Oral Tab/Cap 400 milliGRAM(s) Oral every 8 hours  clotrimazole Lozenge 1 Lozenge Oral five times a day  fluconAZOLE   Tablet 400 milliGRAM(s) Oral daily  vancomycin    Solution 125 milliGRAM(s) Oral every 6 hours      Heme / Onc:       GI:  docusate sodium 100 milliGRAM(s) Oral three times a day PRN  pantoprazole    Tablet 40 milliGRAM(s) Oral before breakfast  polyethylene glycol 3350 17 Gram(s) Oral daily  senna 1 Tablet(s) Oral at bedtime PRN  sodium bicarbonate Mouth Rinse 10 milliLiter(s) Swish and Spit five times a day  ursodiol Capsule 300 milliGRAM(s) Oral two times a day with meals      Cardiovascular:   amLODIPine   Tablet 5 milliGRAM(s) Oral daily  losartan 100 milliGRAM(s) Oral daily      Immunologic:   immune   globulin 10% (GAMMAGARD) IVPB 20 Gram(s) IV Intermittent <User Schedule>  mycophenolate mofetil 1000 milliGRAM(s) Oral <User Schedule>  tacrolimus 1 milliGRAM(s) Oral every 12 hours      Other medications:   acetaminophen   Tablet .. 650 milliGRAM(s) Oral every 6 hours  acetaminophen   Tablet .. 650 milliGRAM(s) Oral <User Schedule>  benzonatate 100 milliGRAM(s) Oral every 8 hours  Biotene Dry Mouth Oral Rinse 5 milliLiter(s) Swish and Spit five times a day  diphenhydrAMINE   Injectable 25 milliGRAM(s) IV Push every 3 weeks  folic acid 1 milliGRAM(s) Oral daily  levETIRAcetam 500 milliGRAM(s) Oral two times a day  lidocaine/prilocaine Cream 1 Application(s) Topical daily  loratadine 10 milliGRAM(s) Oral daily  magnesium oxide 400 milliGRAM(s) Oral three times a day with meals  multivitamin 1 Tablet(s) Oral daily  nystatin Powder 1 Application(s) Topical every 8 hours  ondansetron Injectable 8 milliGRAM(s) IV Push every 8 hours  sodium chloride 0.9%. 1000 milliLiter(s) IV Continuous <Continuous>      PRN:   acetaminophen   Tablet .. 650 milliGRAM(s) Oral every 6 hours PRN  acetaminophen   Tablet .. 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 diff - started vancomycin 125mg po q hours   Platelet refractory - platelets dispensed as 1/2 units to be given over 3 hours q 12 hours   increased back/bone pain with nausea- increased Dilaudid, added Ativan prn and Emend 80mg x 3 days  Engrafted off  Zarxio and Cefepime.  Discharge planning for 3/4    1. Infectious Disease:   acyclovir   Oral Tab/Cap 400 milliGRAM(s) Oral every 8 hours  clotrimazole Lozenge 1 Lozenge Oral five times a day  fluconAZOLE   Tablet 400 milliGRAM(s) Oral daily  vancomycin    Solution 125 milliGRAM(s) Oral every 6 hours    2. VOD Prophylaxis: Actigall, Glutamine    3. GI Prophylaxis:  pantoprazole    Tablet 40 milliGRAM(s) Oral before breakfast    4. Mouthcare - NS / NaHCO3 rinses, Mycelex, Biotene; Skin care     5. GVHD prophylaxis -  mycophenolate mofetil 1000 milliGRAM(s) Oral <User Schedule>  tacrolimus 1 milliGRAM(s) Oral every 12 hours    6. Transfuse & replete electrolytes prn   magnesium oxide 400 milliGRAM(s) Oral three times a day with meals    7. IV hydration, daily weights, strict I&O, prn diuresis     8. PO intake as tolerated, nutrition follow up as needed, MVI, folic acid     9. Antiemetics, anti-diarrhea medications:   LORazepam   Injectable 0.5 milliGRAM(s) IV Push every 6 hours PRN  metoclopramide Injectable 10 milliGRAM(s) IV Push every 6 hours PRN  ondansetron Injectable 8 milliGRAM(s) IV Push every 8 hours    10. OOB as tolerated, physical therapy consult if needed     11. Monitor coags / fibrinogen 2x week, vitamin K as needed     12. Monitor closely for clinical changes, monitor for fevers     13. Emotional support provided, plan of care discussed and questions addressed     14. Patient education done regarding plan of care, restrictions and discharge planning     15. Continue regular social work input     I have written the above note for Dr. Logan who performed service with me in the room.   Janett Barrow NP-C ([phone])    I have seen and examined patient with NP, I agree with above note as scribed. 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    S: Patient seen and examined with HPC Transplant Team:     + knee pain  Cough has resolved    Denies mouth / tongue / throat pain, dyspnea, nausea, vomiting, diarrhea, abdominal pain     O: Vitals:   Vital Signs Last 24 Hrs  T(C): 36.6 (04 Mar 2019 05:32), Max: 37.1 (03 Mar 2019 10:00)  T(F): 97.8 (04 Mar 2019 05:32), Max: 98.8 (03 Mar 2019 10:00)  HR: 94 (04 Mar 2019 05:32) (84 - 99)  BP: 138/86 (04 Mar 2019 05:32) (129/85 - 148/86)  BP(mean): --  RR: 18 (04 Mar 2019 05:32) (18 - 18)  SpO2: 97% (04 Mar 2019 05:32) (95% - 98%)    Admit weight: 114.4kg  Daily Weight in k.4 kg    Intake / Output:    @ 07:04 @ 07:00  --------------------------------------------------------  IN: 1651.8 mL / OUT: 1775 mL / NET: -123.2 mL     @ 07:04 @ 08:35  --------------------------------------------------------  IN: 59.2 mL / OUT: 0 mL / NET: 59.2 mL      PE:   Oropharynx: no erythema or ulcers  Oral Mucositis: n/a                                                       stGstrstastdstest:st st1st CVS: S1S2, RRR  Lungs: CTA throughout bilaterally   Abdomen: soft, NT/ND, normoactive BS x 4Q  Extremities: no edema  Gastric Mucositis: n/a                                                 stGstrstastdstest:st st1st Intestinal Mucositis: n/a                                             stGstrstastdstest:st st1st Skin: no rash  TLC: C/D/I  Neuro: A&O x 3   Pain: chronic back pain/ left knee pain 6/10      Labs:   CBC Full  -  ( 04 Mar 2019 06:52 )  WBC Count : 7.4 K/uL  Hemoglobin : 8.3 g/dL  Hematocrit : 24.3 %  Platelet Count - Automated : 18 K/uL  Mean Cell Volume : 103.0 fl  Mean Cell Hemoglobin : 35.2 pg  Mean Cell Hemoglobin Concentration : 34.3 gm/dL  Auto Neutrophil # : x  Auto Lymphocyte # : x  Auto Monocyte # : x  Auto Eosinophil # : x  Auto Basophil # : x  Auto Neutrophil % : x  Auto Lymphocyte % : x  Auto Monocyte % : x  Auto Eosinophil % : x  Auto Basophil % : x                          8.3    7.4   )-----------( 18       ( 04 Mar 2019 06:52 )             24.3     -    141  |  105  |  11  ----------------------------<  93  4.5   |  26  |  0.86    Ca    10.3      04 Mar 2019 06:52  Phos  3.3     -  Mg     1.7     -    TPro  5.5<L>  /  Alb  3.1<L>  /  TBili  0.1<L>  /  DBili  <0.1  /  AST  34  /  ALT  29  /  AlkPhos  197<H>      PT/INR - ( 04 Mar 2019 06:52 )   PT: 10.6 sec;   INR: 0.93 ratio         PTT - ( 04 Mar 2019 06:52 )  PTT:63.0 sec  LIVER FUNCTIONS - ( 04 Mar 2019 06:52 )  Alb: 3.1 g/dL / Pro: 5.5 g/dL / ALK PHOS: 197 U/L / ALT: 29 U/L / AST: 34 U/L / GGT: x           Lactate Dehydrogenase, Serum: 276 U/L ( @ 06:52)       03-03 @ 09:45  Tacrolimus 9.8                Cyclosporine --    Cultures:   Culture Results: urine  <10,000 CFU/ml Normal Urogenital everton present (19 @ 18:41)    Culture Results: blood  No growth at 5 days. (19 @ 18:30)    Culture Results: blood  No growth at 5 days. (19 @ 18:30)    Radiology:   EXAM:  XR CHEST PORTABLE URGENT 1V                        PROCEDURE DATE:  2019    IMPRESSION:  1.  The lungs are clear.    EXAM:  CT BRAIN                        PROCEDURE DATE:  2019    IMPRESSION:  No acute intracranial abnormality is noted. No subdural collections are   visualized. If the patient has new and persistent symptoms, consider   follow-up brain MRI with and without contrast, if there are no MRI or   contrast contraindications.  New moderate mucosal thickening involves the paranasal sinuses. Correlate   for possible sinusitis      Meds:   Antimicrobials:   acyclovir   Oral Tab/Cap 400 milliGRAM(s) Oral every 8 hours  clotrimazole Lozenge 1 Lozenge Oral five times a day  fluconAZOLE   Tablet 400 milliGRAM(s) Oral daily  vancomycin    Solution 125 milliGRAM(s) Oral every 6 hours      Heme / Onc:       GI:  docusate sodium 100 milliGRAM(s) Oral three times a day PRN  pantoprazole    Tablet 40 milliGRAM(s) Oral before breakfast  polyethylene glycol 3350 17 Gram(s) Oral daily  senna 1 Tablet(s) Oral at bedtime PRN  sodium bicarbonate Mouth Rinse 10 milliLiter(s) Swish and Spit five times a day  ursodiol Capsule 300 milliGRAM(s) Oral two times a day with meals      Cardiovascular:   amLODIPine   Tablet 5 milliGRAM(s) Oral daily  losartan 100 milliGRAM(s) Oral daily      Immunologic:   immune   globulin 10% (GAMMAGARD) IVPB 20 Gram(s) IV Intermittent <User Schedule>  mycophenolate mofetil 1000 milliGRAM(s) Oral <User Schedule>  tacrolimus 1 milliGRAM(s) Oral every 12 hours      Other medications:   acetaminophen   Tablet .. 650 milliGRAM(s) Oral every 6 hours  acetaminophen   Tablet .. 650 milliGRAM(s) Oral <User Schedule>  benzonatate 100 milliGRAM(s) Oral every 8 hours  Biotene Dry Mouth Oral Rinse 5 milliLiter(s) Swish and Spit five times a day  diphenhydrAMINE   Injectable 25 milliGRAM(s) IV Push every 3 weeks  folic acid 1 milliGRAM(s) Oral daily  levETIRAcetam 500 milliGRAM(s) Oral two times a day  lidocaine/prilocaine Cream 1 Application(s) Topical daily  loratadine 10 milliGRAM(s) Oral daily  magnesium oxide 400 milliGRAM(s) Oral three times a day with meals  multivitamin 1 Tablet(s) Oral daily  nystatin Powder 1 Application(s) Topical every 8 hours  ondansetron Injectable 8 milliGRAM(s) IV Push every 8 hours  sodium chloride 0.9%. 1000 milliLiter(s) IV Continuous <Continuous>      PRN:   acetaminophen   Tablet .. 650 milliGRAM(s) Oral every 6 hours PRN  acetaminophen   Tablet .. 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 diff - started vancomycin 125mg po q hours   Platelet refractory - platelets dispensed as 1/2 units to be given over 3 hours q 12 hours   increased back/bone pain with nausea- increased Dilaudid, added Ativan prn and Emend 80mg x 3 days  Engrafted off  Zarxio and Cefepime.  Tacro level 9.2, will be discharged on 1 mg PO BID.  Mepron started today.  Discharge planned for later today after 1U PRBCs and one bag platelets.  Heparin drip discontinued at 7:30 AM.    1. Infectious Disease:   acyclovir   Oral Tab/Cap 400 milliGRAM(s) Oral every 8 hours  clotrimazole Lozenge 1 Lozenge Oral five times a day  fluconAZOLE   Tablet 400 milliGRAM(s) Oral daily  vancomycin    Solution 125 milliGRAM(s) Oral every 6 hours    2. VOD Prophylaxis: Actigall, Glutamine    3. GI Prophylaxis:  pantoprazole    Tablet 40 milliGRAM(s) Oral before breakfast    4. Mouthcare - NS / NaHCO3 rinses, Mycelex, Biotene; Skin care     5. GVHD prophylaxis -  mycophenolate mofetil 1000 milliGRAM(s) Oral <User Schedule>  tacrolimus 1 milliGRAM(s) Oral every 12 hours    6. Transfuse & replete electrolytes prn   magnesium oxide 400 milliGRAM(s) Oral three times a day with meals    7. IV hydration, daily weights, strict I&O, prn diuresis     8. PO intake as tolerated, nutrition follow up as needed, MVI, folic acid     9. Antiemetics, anti-diarrhea medications:   LORazepam   Injectable 0.5 milliGRAM(s) IV Push every 6 hours PRN  metoclopramide Injectable 10 milliGRAM(s) IV Push every 6 hours PRN  ondansetron Injectable 8 milliGRAM(s) IV Push every 8 hours    10. OOB as tolerated, physical therapy consult if needed     11. Monitor coags / fibrinogen 2x week, vitamin K as needed     12. Monitor closely for clinical changes, monitor for fevers     13. Emotional support provided, plan of care discussed and questions addressed     14. Patient education done regarding plan of care, restrictions and discharge planning     15. Continue regular social work input     I have written the above note for Dr. Logan who performed service with me in the room.   Janett Barrow NP-C ([phone])    I have seen and examined patient with NP, I agree with above note as scribed. 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    S: Patient seen and examined with HPC Transplant Team:     + knee pain  Cough has resolved    Denies mouth / tongue / throat pain, dyspnea, nausea, vomiting, diarrhea, abdominal pain     O: Vitals:   Vital Signs Last 24 Hrs  T(C): 36.6 (04 Mar 2019 05:32), Max: 37.1 (03 Mar 2019 10:00)  T(F): 97.8 (04 Mar 2019 05:32), Max: 98.8 (03 Mar 2019 10:00)  HR: 94 (04 Mar 2019 05:32) (84 - 99)  BP: 138/86 (04 Mar 2019 05:32) (129/85 - 148/86)  BP(mean): --  RR: 18 (04 Mar 2019 05:32) (18 - 18)  SpO2: 97% (04 Mar 2019 05:32) (95% - 98%)    Admit weight: 114.4kg  Daily Weight in k.4 kg    Intake / Output:    @ 07:04 @ 07:00  --------------------------------------------------------  IN: 1651.8 mL / OUT: 1775 mL / NET: -123.2 mL     @ 07:04 @ 08:35  --------------------------------------------------------  IN: 59.2 mL / OUT: 0 mL / NET: 59.2 mL      PE:   Oropharynx: no erythema or ulcers  Oral Mucositis: n/a                                                       stGstrstastdstest:st st1st CVS: S1S2, RRR  Lungs: CTA throughout bilaterally   Abdomen: soft, NT/ND, normoactive BS x 4Q  Extremities: no edema  Gastric Mucositis: n/a                                                 stGstrstastdstest:st st1st Intestinal Mucositis: n/a                                             stGstrstastdstest:st st1st Skin: no rash  TLC: C/D/I  Neuro: A&O x 3   Pain: chronic back pain/ left knee pain 6/10      Labs:   CBC Full  -  ( 04 Mar 2019 06:52 )  WBC Count : 7.4 K/uL  Hemoglobin : 8.3 g/dL  Hematocrit : 24.3 %  Platelet Count - Automated : 18 K/uL  Mean Cell Volume : 103.0 fl  Mean Cell Hemoglobin : 35.2 pg  Mean Cell Hemoglobin Concentration : 34.3 gm/dL  Auto Neutrophil # : x  Auto Lymphocyte # : x  Auto Monocyte # : x  Auto Eosinophil # : x  Auto Basophil # : x  Auto Neutrophil % : x  Auto Lymphocyte % : x  Auto Monocyte % : x  Auto Eosinophil % : x  Auto Basophil % : x                          8.3    7.4   )-----------( 18       ( 04 Mar 2019 06:52 )             24.3     -    141  |  105  |  11  ----------------------------<  93  4.5   |  26  |  0.86    Ca    10.3      04 Mar 2019 06:52  Phos  3.3     -  Mg     1.7     -    TPro  5.5<L>  /  Alb  3.1<L>  /  TBili  0.1<L>  /  DBili  <0.1  /  AST  34  /  ALT  29  /  AlkPhos  197<H>      PT/INR - ( 04 Mar 2019 06:52 )   PT: 10.6 sec;   INR: 0.93 ratio         PTT - ( 04 Mar 2019 06:52 )  PTT:63.0 sec  LIVER FUNCTIONS - ( 04 Mar 2019 06:52 )  Alb: 3.1 g/dL / Pro: 5.5 g/dL / ALK PHOS: 197 U/L / ALT: 29 U/L / AST: 34 U/L / GGT: x           Lactate Dehydrogenase, Serum: 276 U/L ( @ 06:52)       03-03 @ 09:45  Tacrolimus 9.8                Cyclosporine --    Cultures:   Culture Results: urine  <10,000 CFU/ml Normal Urogenital everton present (19 @ 18:41)    Culture Results: blood  No growth at 5 days. (19 @ 18:30)    Culture Results: blood  No growth at 5 days. (19 @ 18:30)    Radiology:   EXAM:  XR CHEST PORTABLE URGENT 1V                        PROCEDURE DATE:  2019    IMPRESSION:  1.  The lungs are clear.    EXAM:  CT BRAIN                        PROCEDURE DATE:  2019    IMPRESSION:  No acute intracranial abnormality is noted. No subdural collections are   visualized. If the patient has new and persistent symptoms, consider   follow-up brain MRI with and without contrast, if there are no MRI or   contrast contraindications.  New moderate mucosal thickening involves the paranasal sinuses. Correlate   for possible sinusitis      Meds:   Antimicrobials:   acyclovir   Oral Tab/Cap 400 milliGRAM(s) Oral every 8 hours  clotrimazole Lozenge 1 Lozenge Oral five times a day  fluconAZOLE   Tablet 400 milliGRAM(s) Oral daily  vancomycin    Solution 125 milliGRAM(s) Oral every 6 hours      Heme / Onc:       GI:  docusate sodium 100 milliGRAM(s) Oral three times a day PRN  pantoprazole    Tablet 40 milliGRAM(s) Oral before breakfast  polyethylene glycol 3350 17 Gram(s) Oral daily  senna 1 Tablet(s) Oral at bedtime PRN  sodium bicarbonate Mouth Rinse 10 milliLiter(s) Swish and Spit five times a day  ursodiol Capsule 300 milliGRAM(s) Oral two times a day with meals      Cardiovascular:   amLODIPine   Tablet 5 milliGRAM(s) Oral daily  losartan 100 milliGRAM(s) Oral daily      Immunologic:   immune   globulin 10% (GAMMAGARD) IVPB 20 Gram(s) IV Intermittent <User Schedule>  mycophenolate mofetil 1000 milliGRAM(s) Oral <User Schedule>  tacrolimus 1 milliGRAM(s) Oral every 12 hours      Other medications:   acetaminophen   Tablet .. 650 milliGRAM(s) Oral every 6 hours  acetaminophen   Tablet .. 650 milliGRAM(s) Oral <User Schedule>  benzonatate 100 milliGRAM(s) Oral every 8 hours  Biotene Dry Mouth Oral Rinse 5 milliLiter(s) Swish and Spit five times a day  diphenhydrAMINE   Injectable 25 milliGRAM(s) IV Push every 3 weeks  folic acid 1 milliGRAM(s) Oral daily  levETIRAcetam 500 milliGRAM(s) Oral two times a day  lidocaine/prilocaine Cream 1 Application(s) Topical daily  loratadine 10 milliGRAM(s) Oral daily  magnesium oxide 400 milliGRAM(s) Oral three times a day with meals  multivitamin 1 Tablet(s) Oral daily  nystatin Powder 1 Application(s) Topical every 8 hours  ondansetron Injectable 8 milliGRAM(s) IV Push every 8 hours  sodium chloride 0.9%. 1000 milliLiter(s) IV Continuous <Continuous>      PRN:   acetaminophen   Tablet .. 650 milliGRAM(s) Oral every 6 hours PRN  acetaminophen   Tablet .. 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  Baseline CT Head non-con, no acute intracranial abnormality, possible sinusitis   Cough x 3 weeks, CXR clear, continue Tessalon 100 mg PRN, RVP (-)  Mild transaminitis - resolved   + C. diff - started vancomycin 125mg po q hours   Platelet refractory - platelets dispensed as 1/2 units to be given over 3 hours q 12 hours   increased back/bone pain with nausea- increased Dilaudid, added Ativan prn and Emend 80mg x 3 days  Engrafted off  Zarxio and Cefepime. FISH for Y and CMV monitoring sent 3/1.  Tacro level 9.2, will be discharged on 1 mg PO BID.  Mepron started today.  Discharge planned for later today after 1U PRBCs and one bag platelets.  Heparin drip discontinued at 7:30 AM.    1. Infectious Disease:   acyclovir   Oral Tab/Cap 400 milliGRAM(s) Oral every 8 hours  clotrimazole Lozenge 1 Lozenge Oral five times a day  fluconAZOLE   Tablet 400 milliGRAM(s) Oral daily  vancomycin    Solution 125 milliGRAM(s) Oral every 6 hours    2. VOD Prophylaxis: Actigall, Glutamine    3. GI Prophylaxis:  pantoprazole    Tablet 40 milliGRAM(s) Oral before breakfast    4. Mouthcare - NS / NaHCO3 rinses, Mycelex, Biotene; Skin care     5. GVHD prophylaxis -  mycophenolate mofetil 1000 milliGRAM(s) Oral <User Schedule>  tacrolimus 1 milliGRAM(s) Oral every 12 hours    6. Transfuse & replete electrolytes prn   magnesium oxide 400 milliGRAM(s) Oral three times a day with meals    7. IV hydration, daily weights, strict I&O, prn diuresis     8. PO intake as tolerated, nutrition follow up as needed, MVI, folic acid     9. Antiemetics, anti-diarrhea medications:   LORazepam   Injectable 0.5 milliGRAM(s) IV Push every 6 hours PRN  metoclopramide Injectable 10 milliGRAM(s) IV Push every 6 hours PRN  ondansetron Injectable 8 milliGRAM(s) IV Push every 8 hours    10. OOB as tolerated, physical therapy consult if needed     11. Monitor coags / fibrinogen 2x week, vitamin K as needed     12. Monitor closely for clinical changes, monitor for fevers     13. Emotional support provided, plan of care discussed and questions addressed     14. Patient education done regarding plan of care, restrictions and discharge planning     15. Continue regular social work input     I have written the above note for Dr. Logan who performed service with me in the room.   Janett Barrow NP-C ([phone])    I have seen and examined patient with NP, I agree with above note as scribed. 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    S: Patient seen and examined with HPC Transplant Team:     + left knee pain  Cough has resolved    Denies mouth / tongue / throat pain, dyspnea, nausea, vomiting, diarrhea, abdominal pain     O: Vitals:   Vital Signs Last 24 Hrs  T(C): 36.6 (04 Mar 2019 05:32), Max: 37.1 (03 Mar 2019 10:00)  T(F): 97.8 (04 Mar 2019 05:32), Max: 98.8 (03 Mar 2019 10:00)  HR: 94 (04 Mar 2019 05:32) (84 - 99)  BP: 138/86 (04 Mar 2019 05:32) (129/85 - 148/86)  BP(mean): --  RR: 18 (04 Mar 2019 05:32) (18 - 18)  SpO2: 97% (04 Mar 2019 05:32) (95% - 98%)    Admit weight: 114.4kg  Daily Weight in k.4 kg    Intake / Output:    @ 07: @ 07:00  --------------------------------------------------------  IN: 1651.8 mL / OUT: 1775 mL / NET: -123.2 mL     @ 07: @ 08:35  --------------------------------------------------------  IN: 59.2 mL / OUT: 0 mL / NET: 59.2 mL      PE:   Oropharynx: no erythema or ulcers  Oral Mucositis: n/a                                                       stGstrstastdstest:st st1st CVS: S1S2, RRR  Lungs: CTA throughout bilaterally   Abdomen: soft, NT/ND, normoactive BS x 4Q  Extremities: no edema  Gastric Mucositis: n/a                                                 stGstrstastdstest:st st1st Intestinal Mucositis: n/a                                             stGstrstastdstest:st st1st Skin: no rash  TLC: C/D/I  Neuro: A&O x 3   Pain: chronic back pain/ left knee pain 6/10      Labs:   CBC Full  -  ( 04 Mar 2019 06:52 )  WBC Count : 7.4 K/uL  Hemoglobin : 8.3 g/dL  Hematocrit : 24.3 %  Platelet Count - Automated : 18 K/uL  Mean Cell Volume : 103.0 fl  Mean Cell Hemoglobin : 35.2 pg  Mean Cell Hemoglobin Concentration : 34.3 gm/dL  Auto Neutrophil # : x  Auto Lymphocyte # : x  Auto Monocyte # : x  Auto Eosinophil # : x  Auto Basophil # : x  Auto Neutrophil % : x  Auto Lymphocyte % : x  Auto Monocyte % : x  Auto Eosinophil % : x  Auto Basophil % : x                          8.3    7.4   )-----------( 18       ( 04 Mar 2019 06:52 )             24.3     -    141  |  105  |  11  ----------------------------<  93  4.5   |  26  |  0.86    Ca    10.3      04 Mar 2019 06:52  Phos  3.3     -  Mg     1.7         TPro  5.5<L>  /  Alb  3.1<L>  /  TBili  0.1<L>  /  DBili  <0.1  /  AST  34  /  ALT  29  /  AlkPhos  197<H>      PT/INR - ( 04 Mar 2019 06:52 )   PT: 10.6 sec;   INR: 0.93 ratio         PTT - ( 04 Mar 2019 06:52 )  PTT:63.0 sec  LIVER FUNCTIONS - ( 04 Mar 2019 06:52 )  Alb: 3.1 g/dL / Pro: 5.5 g/dL / ALK PHOS: 197 U/L / ALT: 29 U/L / AST: 34 U/L / GGT: x           Lactate Dehydrogenase, Serum: 276 U/L ( @ 06:52)       03- @ 09:45  Tacrolimus 9.8                Cyclosporine --    Cultures:   Culture Results: urine  <10,000 CFU/ml Normal Urogenital everton present (19 @ 18:41)    Culture Results: blood  No growth at 5 days. (19 @ 18:30)    Culture Results: blood  No growth at 5 days. (19 @ 18:30)    Radiology:   EXAM:  XR CHEST PORTABLE URGENT 1V                        PROCEDURE DATE:  2019    IMPRESSION:  1.  The lungs are clear.    EXAM:  CT BRAIN                        PROCEDURE DATE:  2019    IMPRESSION:  No acute intracranial abnormality is noted. No subdural collections are   visualized. If the patient has new and persistent symptoms, consider   follow-up brain MRI with and without contrast, if there are no MRI or   contrast contraindications.  New moderate mucosal thickening involves the paranasal sinuses. Correlate   for possible sinusitis      Meds:   Antimicrobials:   acyclovir   Oral Tab/Cap 400 milliGRAM(s) Oral every 8 hours  clotrimazole Lozenge 1 Lozenge Oral five times a day  fluconAZOLE   Tablet 400 milliGRAM(s) Oral daily  vancomycin    Solution 125 milliGRAM(s) Oral every 6 hours      Heme / Onc:       GI:  docusate sodium 100 milliGRAM(s) Oral three times a day PRN  pantoprazole    Tablet 40 milliGRAM(s) Oral before breakfast  polyethylene glycol 3350 17 Gram(s) Oral daily  senna 1 Tablet(s) Oral at bedtime PRN  sodium bicarbonate Mouth Rinse 10 milliLiter(s) Swish and Spit five times a day  ursodiol Capsule 300 milliGRAM(s) Oral two times a day with meals      Cardiovascular:   amLODIPine   Tablet 5 milliGRAM(s) Oral daily  losartan 100 milliGRAM(s) Oral daily      Immunologic:   immune   globulin 10% (GAMMAGARD) IVPB 20 Gram(s) IV Intermittent <User Schedule>  mycophenolate mofetil 1000 milliGRAM(s) Oral <User Schedule>  tacrolimus 1 milliGRAM(s) Oral every 12 hours      Other medications:   acetaminophen   Tablet .. 650 milliGRAM(s) Oral every 6 hours  acetaminophen   Tablet .. 650 milliGRAM(s) Oral <User Schedule>  benzonatate 100 milliGRAM(s) Oral every 8 hours  Biotene Dry Mouth Oral Rinse 5 milliLiter(s) Swish and Spit five times a day  diphenhydrAMINE   Injectable 25 milliGRAM(s) IV Push every 3 weeks  folic acid 1 milliGRAM(s) Oral daily  levETIRAcetam 500 milliGRAM(s) Oral two times a day  lidocaine/prilocaine Cream 1 Application(s) Topical daily  loratadine 10 milliGRAM(s) Oral daily  magnesium oxide 400 milliGRAM(s) Oral three times a day with meals  multivitamin 1 Tablet(s) Oral daily  nystatin Powder 1 Application(s) Topical every 8 hours  ondansetron Injectable 8 milliGRAM(s) IV Push every 8 hours  sodium chloride 0.9%. 1000 milliLiter(s) IV Continuous <Continuous>      PRN:   acetaminophen   Tablet .. 650 milliGRAM(s) Oral every 6 hours PRN  acetaminophen   Tablet .. 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 diff - started vancomycin 125mg po q hours   Platelet refractory - platelets dispensed as 1/2 units to be given over 3 hours q 12 hours   increased back/bone pain with nausea- increased Dilaudid, added Ativan prn and Emend 80mg x 3 days  Engrafted off  Zarxio and Cefepime. FISH for Y and CMV monitoring sent 3/1.  Tacro level 9.2, will be discharged on 1 mg PO BID.  Mepron started today.  Discharge planned for later today after 1U PRBCs and one bag platelets.  Heparin drip discontinued at 7:30 AM.    1. Infectious Disease:   acyclovir   Oral Tab/Cap 400 milliGRAM(s) Oral every 8 hours  clotrimazole Lozenge 1 Lozenge Oral five times a day  fluconAZOLE   Tablet 400 milliGRAM(s) Oral daily  vancomycin    Solution 125 milliGRAM(s) Oral every 6 hours    2. VOD Prophylaxis: Actigall, Glutamine    3. GI Prophylaxis:  pantoprazole    Tablet 40 milliGRAM(s) Oral before breakfast    4. Mouthcare - NS / NaHCO3 rinses, Mycelex, Biotene; Skin care     5. GVHD prophylaxis -  mycophenolate mofetil 1000 milliGRAM(s) Oral <User Schedule>  tacrolimus 1 milliGRAM(s) Oral every 12 hours    6. Transfuse & replete electrolytes prn   magnesium oxide 400 milliGRAM(s) Oral three times a day with meals    7. IV hydration, daily weights, strict I&O, prn diuresis     8. PO intake as tolerated, nutrition follow up as needed, MVI, folic acid     9. Antiemetics, anti-diarrhea medications:   LORazepam   Injectable 0.5 milliGRAM(s) IV Push every 6 hours PRN  metoclopramide Injectable 10 milliGRAM(s) IV Push every 6 hours PRN  ondansetron Injectable 8 milliGRAM(s) IV Push every 8 hours    10. OOB as tolerated, physical therapy consult if needed     11. Monitor coags / fibrinogen 2x week, vitamin K as needed     12. Monitor closely for clinical changes, monitor for fevers     13. Emotional support provided, plan of care discussed and questions addressed     14. Patient education done regarding plan of care, restrictions and discharge planning     15. Continue regular social work input     I have written the above note for Dr. Logan who performed service with me in the room.   Janett Barrow NP-C ([phone])    I have seen and examined patient with NP, I agree with above note as scribed.

## 2019-03-04 NOTE — DISCHARGE NOTE ADULT - PROVIDER TOKENS
PROVIDER:[TOKEN:[[id number]]],PROVIDER:[TOKEN:[[id number]]],PROVIDER:[TOKEN:[[id number]]]

## 2019-03-04 NOTE — DISCHARGE NOTE ADULT - ADDITIONAL INSTRUCTIONS
You have a follow up appointment with Dr. Bentley at the [facility] on Thursday, 3/7/19 at 1pm. Please arrive 15 minutes early to have your blood drawn.   You have a follow up appointment with Rowena El NP at the [facility] on Friday, 3/15/19 at 12:30pm. Please arrive 15 minutes early to have your blood drawn   You have a follow up appointment with Roewna El NP at the [facility] on Friday, 3/22/19 at 11am. Please arrive 15 minutes early to have your blood drawn. You have a follow up appointment with Dr. Bentley at the [facility] on Thursday, 3/7/19 at 1pm. Please arrive 15 minutes early to have your blood drawn. If you need platelets, possible platelet appointment has been made for you in the treatment room at 2pm   You have a possible platelet appointment in the treatment room of the [facility] on Tuesday, 3/12/19 at 1:30pm  You have a follow up appointment with Rowena El NP at the [facility] on Friday, 3/15/19 at 12:30pm. Please arrive 15 minutes early to have your blood drawn, If you need platelets, a possible platelet appointment has been made for you in the treatment room at 1:30pm  You have a possible platelet appointment on Tuesday, 3/19/19at 1:30pm in the treatment room of the [facility].   You have a follow up appointment with Rowena El NP at the [facility] on Friday, 3/22/19 at 11am. Please arrive 15 minutes early to have your blood drawn. If you need platelets, a possible platelet appointment has been made for you in the treatment room at 12pm.

## 2019-03-04 NOTE — DISCHARGE NOTE ADULT - CARE PROVIDERS DIRECT ADDRESSES
[email or web address]

## 2019-03-04 NOTE — DISCHARGE NOTE ADULT - CARE PROVIDER_API CALL
Luke Bentley)  Internal Medicine; Medical Oncology  [address] [place] [address]  Phone: [phone]  Fax: [phone]  Follow Up Time:     Germaine Deleon (NP; RN)  NP in Primary Care AdultGerontology  [address] [place] [address]  Phone: [phone]  Fax: [phone]  Follow Up Time:     Rowena El (NP; RN)  NP in Adult Health  [address] [place] [address]  Phone: [phone]  Fax: [phone]  Follow Up Time:

## 2019-03-04 NOTE — PROGRESS NOTE ADULT - ATTENDING COMMENTS
58 year old female with PMH [place] chromosome positive ALL diagnosed in 2016 s/p R HyperCVAD X 4 cycles, IT MTX X 2, s/p autologous stem cell transplantation (12/16/16),  who presented in October 2018 with subdural hemorrhage and relapsed disease confirmed by peripheral blood flow cytometry treated with Inotuzumab X 2 cycles.  Bone marrow biopsy on 1/23/19 showed remission with FISH and PCR for BCR-ABL translocation negative on the BM specimen but peripheral blood testing on 1/31 showed .004% BCR-ABL transcript with negative FISH suggesting MRD positivity.  Patient was on Ponatinib, DC 1/31/19. LFT's improved off drug.  Now in CR2    Patient was admitted for haploidentical stem cell transplantation with fludarabine/cytoxan/TBI conditioning. She is s/p HPC transplant, day + 19  Completed high-dose Cytoxan on days +3, +4(GVHD prophylaxis); then begin Tacrolimus, Cellcept on day +5. Check repeat Tacro level today  Begin Zarxio on day +5  + WBC engraftment- discontinued Zarxio. Monitor daily CBC with diff. Monitor for aGVHD- no evidence today  Neutropenic fevers resolved, s/p haplo storm - on Cefepime, culture from 2/14, 2/16 negative.  CXR negative.  On Acyclovir, Fluconazole prophylaxis.  Neutropenia now resolved with engraftment and growth factor support- D/C'd Cefepime and Zarxio  C. Diff diarrhea- continue oral Vancomycin, diarrhea resolved  Monitor I/O's/weights- lasix as needed  VOD prophylaxis as per protocol.  Mouth care, skin care  Antiemetics as needed    History of SDH, continue Keppra. CT scan baseline 2/7/19 negative for SDH.   History of HTN continue Losartan.  OOB/ambulate  Discharge planning 58 year old female with PMH [place] chromosome positive ALL diagnosed in 2016 s/p R HyperCVAD X 4 cycles, IT MTX X 2, s/p autologous stem cell transplantation (12/16/16),  who presented in October 2018 with subdural hemorrhage and relapsed disease confirmed by peripheral blood flow cytometry treated with Inotuzumab X 2 cycles.  Bone marrow biopsy on 1/23/19 showed remission with FISH and PCR for BCR-ABL translocation negative on the BM specimen but peripheral blood testing on 1/31 showed .004% BCR-ABL transcript with negative FISH suggesting MRD positivity.  Patient was on Ponatinib, DC 1/31/19. LFT's improved off drug.  Now in CR2    Patient was admitted for haploidentical stem cell transplantation with fludarabine/cytoxan/TBI conditioning. She is s/p HPC transplant, day + 19  Completed high-dose Cytoxan on days +3, +4(GVHD prophylaxis); then begin Tacrolimus, Cellcept on day +5. Check repeat Tacro level today  Begin Zarxio on day +5  + WBC engraftment- discontinued Zarxio. Monitor daily CBC with diff. Monitor for aGVHD- no evidence today  Neutropenic fevers resolved, s/p haplo storm - on Cefepime, culture from 2/14, 2/16 negative.  CXR negative.  On Acyclovir, Fluconazole prophylaxis, will add Mepron today.  Neutropenia now resolved with engraftment and growth factor support- D/C'd Cefepime and Zarxio  C. Diff diarrhea- continue oral Vancomycin, diarrhea resolved  Monitor I/O's/weights- lasix as needed  VOD prophylaxis as per protocol.  Mouth care, skin care  Antiemetics as needed    History of SDH, continue Keppra. CT scan baseline 2/7/19 negative for SDH.   History of HTN continue Losartan.  OOB/ambulate  Discharge to home today and followup at [facility] on 3/7/19. Continue GVHD and ID prophylaxis.

## 2019-03-04 NOTE — DISCHARGE NOTE ADULT - INSTRUCTIONS
Diet and activities as per [facility] discharge guidelines and safe food handling guidelines. NO RESTAURANT OR TAKE OUT FOOD AT THIS TIME, ONLY HOME COOKED PREPARED/FROZEN FOODS. You are allowed to have fresh baked pizza right out of the oven. This is the ONLY takeout food at this time.

## 2019-03-05 ENCOUNTER — OUTPATIENT (OUTPATIENT)
Dept: OUTPATIENT SERVICES | Facility: HOSPITAL | Age: 59
LOS: 1 days | Discharge: HOME | End: 2019-03-05

## 2019-03-05 ENCOUNTER — INBOUND DOCUMENT (OUTPATIENT)
Age: 59
End: 2019-03-05

## 2019-03-05 ENCOUNTER — OUTPATIENT (OUTPATIENT)
Dept: OUTPATIENT SERVICES | Facility: HOSPITAL | Age: 59
LOS: 1 days | End: 2019-03-05
Payer: MEDICARE

## 2019-03-05 DIAGNOSIS — C91.00 ACUTE LYMPHOBLASTIC LEUKEMIA NOT HAVING ACHIEVED REMISSION: ICD-10-CM

## 2019-03-05 DIAGNOSIS — Z41.9 ENCOUNTER FOR PROCEDURE FOR PURPOSES OTHER THAN REMEDYING HEALTH STATE, UNSPECIFIED: Chronic | ICD-10-CM

## 2019-03-05 DIAGNOSIS — Z94.84 STEM CELLS TRANSPLANT STATUS: ICD-10-CM

## 2019-03-05 DIAGNOSIS — Z98.89 OTHER SPECIFIED POSTPROCEDURAL STATES: Chronic | ICD-10-CM

## 2019-03-05 DIAGNOSIS — D17.9 BENIGN LIPOMATOUS NEOPLASM, UNSPECIFIED: Chronic | ICD-10-CM

## 2019-03-07 ENCOUNTER — APPOINTMENT (OUTPATIENT)
Dept: INFUSION THERAPY | Facility: HOSPITAL | Age: 59
End: 2019-03-07

## 2019-03-07 ENCOUNTER — RESULT REVIEW (OUTPATIENT)
Age: 59
End: 2019-03-07

## 2019-03-07 ENCOUNTER — APPOINTMENT (OUTPATIENT)
Dept: HEMATOLOGY ONCOLOGY | Facility: CLINIC | Age: 59
End: 2019-03-07
Payer: MEDICARE

## 2019-03-07 VITALS
HEART RATE: 80 BPM | TEMPERATURE: 98.1 F | DIASTOLIC BLOOD PRESSURE: 79 MMHG | SYSTOLIC BLOOD PRESSURE: 115 MMHG | WEIGHT: 238.1 LBS | OXYGEN SATURATION: 99 % | RESPIRATION RATE: 16 BRPM | BODY MASS INDEX: 42.45 KG/M2

## 2019-03-07 DIAGNOSIS — M62.830 MUSCLE SPASM OF BACK: ICD-10-CM

## 2019-03-07 LAB
BLD GP AB SCN SERPL QL: NEGATIVE — SIGNIFICANT CHANGE UP
HCT VFR BLD CALC: 34.8 % — SIGNIFICANT CHANGE UP (ref 34.5–45)
HGB BLD-MCNC: 11.9 G/DL — SIGNIFICANT CHANGE UP (ref 11.5–15.5)
LYMPHOCYTES # BLD AUTO: 8 % — LOW (ref 13–44)
LYMPHOCYTES # BLD AUTO: SIGNIFICANT CHANGE UP K/UL (ref 1–3.3)
MCHC RBC-ENTMCNC: 33.6 PG — SIGNIFICANT CHANGE UP (ref 27–34)
MCHC RBC-ENTMCNC: 34.3 G/DL — SIGNIFICANT CHANGE UP (ref 32–36)
MCV RBC AUTO: 97.8 FL — SIGNIFICANT CHANGE UP (ref 80–100)
MONOCYTES NFR BLD AUTO: 24 % — HIGH (ref 2–14)
NEUTROPHILS # BLD AUTO: 8.7 K/UL — HIGH (ref 1.8–7.4)
NEUTROPHILS NFR BLD AUTO: 66 % — SIGNIFICANT CHANGE UP (ref 43–77)
NEUTS BAND # BLD: 2 % — SIGNIFICANT CHANGE UP (ref 0–8)
NRBC # BLD: 5 /100 — HIGH (ref 0–0)
PLAT MORPH BLD: NORMAL — SIGNIFICANT CHANGE UP
PLATELET # BLD AUTO: 63 K/UL — LOW (ref 150–400)
RBC # BLD: 3.56 M/UL — LOW (ref 3.8–5.2)
RBC # FLD: 17.4 % — HIGH (ref 10.3–14.5)
RBC BLD AUTO: SIGNIFICANT CHANGE UP
RH IG SCN BLD-IMP: POSITIVE — SIGNIFICANT CHANGE UP
WBC # BLD: 12.6 K/UL — HIGH (ref 3.8–10.5)
WBC # FLD AUTO: 12.6 K/UL — HIGH (ref 3.8–10.5)

## 2019-03-07 PROCEDURE — 88291 CYTO/MOLECULAR REPORT: CPT

## 2019-03-07 PROCEDURE — 99215 OFFICE O/P EST HI 40 MIN: CPT

## 2019-03-08 PROBLEM — M62.830 BACK MUSCLE SPASM: Status: ACTIVE | Noted: 2018-04-20

## 2019-03-08 LAB
ALBUMIN SERPL ELPH-MCNC: 4.2 G/DL
ALP BLD-CCNC: 250 U/L
ALT SERPL-CCNC: 31 U/L
ANION GAP SERPL CALC-SCNC: 15 MMOL/L
AST SERPL-CCNC: 32 U/L
BILIRUB SERPL-MCNC: 0.2 MG/DL
BUN SERPL-MCNC: 11 MG/DL
CALCIUM SERPL-MCNC: 11.1 MG/DL
CHLORIDE SERPL-SCNC: 103 MMOL/L
CO2 SERPL-SCNC: 23 MMOL/L
CREAT SERPL-MCNC: 1.16 MG/DL
GLUCOSE SERPL-MCNC: 97 MG/DL
LDH SERPL-CCNC: 365 U/L
MAGNESIUM SERPL-MCNC: 1.9 MG/DL
POTASSIUM SERPL-SCNC: 4.6 MMOL/L
PROT SERPL-MCNC: 6.8 G/DL
SODIUM SERPL-SCNC: 141 MMOL/L
TACROLIMUS SERPL-MCNC: 5.4 NG/ML

## 2019-03-08 NOTE — ASSESSMENT
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  Recent BM BX was c/w remission. Fish and pcr were negative. Step 1 complicated by hospitalization for neutropenic fevers and c-diff diarrhea 11/1-11/11. \par Was able to successfully collect her stem cell while inpt on 11/8 and 11/9. \par Now s/p Masha-Auto on 12/16/16. Hospital course complicated by pancytopenia and fungal PNA. 12/22 CT chest revealed new 1cm right lung nodule and was started on voriconazole. Also +RVP for rhinovirus/enterovirus. Demonstrated engraftment on 12/26 and d/c'ed in stable condition on 1/3/17.\par \par Now relapsed ph pos ALL......s/p re induction with inotuzimab...on Ponatinib to deepen response.....f/u fish and pcr\par Peripheral blood work stable and discussed with patient. PCR remains positive on pb...hx of subdural...no HA. \par Continued  Ponatinib 45 mg QOD  ...achieved CR.... discussed rationale, risk, benefits and side effects of therapy. Patient verbalized understanding and expressed agreement with treatment plan. 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 Engraftment was noted on 2/27/19. Course complicated by cdiff...100% donor\par \par oxyCODONE 5 mg oral tablet -- 1 tab(s) by mouth every 6 hours, As Needed MDD:4 tabs -- Indication: For As needed for pain \par Cozaar 100 mg oral tablet -- 1 tab(s) by mouth once a day -- Indication: For HTN \par levETIRAcetam 500 mg oral tablet -- 1 tab(s) by mouth 2 times a day -- Indication: For Anti-seizure medication \par ondansetron 8 mg oral tablet -- 1 tab(s) by mouth 3 times a day, As Needed -- Indication: For As needed for nausea \par metoclopramide 10 mg oral tablet -- 1 tab(s) by mouth 4 times a day (before meals and at bedtime), As Needed -- Indication: For As needed for nausea \par fluconazole 200 mg oral tablet -- 2 tab(s) by mouth once a day -- Indication: For Antifungal prophylaxis \par acyclovir 400 mg oral tablet -- 1 tab(s) by mouth every 8 hours -- Indication: For Antiviral prophylaxis \par amLODIPine 5 mg oral tablet -- 1 tab(s) by mouth once a day -- Indication: For HTN \par Actigall 300 mg oral capsule -- 1 cap(s) by mouth 2 times a day -- Indication: For VOD (SOS) prophylaxis \par vancomycin 125 mg oral capsule -- 1 cap(s) by mouth every 6 hours -- Indication: For c. difficile \par mycophenolate mofetil 250 mg oral capsule -- 500 milligram(s) by mouth every 8 hours, will decrease to 1000mg BID at visit on 3/15 -- Indication: For immune suppression \par tacrolimus 1 mg oral capsule -- 1 cap(s) by mouth every 12 hours -- Indication: For immune suppression\par magnesium oxide 400 mg (241.3 mg elemental magnesium) oral tablet -- 1 tab(s) by mouth 3 times a day (with meals) -- Indication: For Supplement \par atovaquone 750 mg/5 mL oral suspension -- 5 milliliter(s) by mouth 2 times a day -- Indication: For PCP prophylaxis \par pantoprazole 40 mg oral delayed release tablet -- 1 tab(s) by mouth once a day (before a meal) -- Indication: For GI prophylaxis \par Multiple Vitamins oral tablet -- 1 tab(s) by mouth once a day -- Indication: For Supplement \par folic acid 1 mg oral tablet -- 1 tab(s) by mouth once a day -- Indication: For Supplement. \par \par Continue medications as instructed. \par Peripheral blood work reviewed and discussed with patient.\par Labs sent today for CMP, LDH, Mg, Tacrolimus level, CMV - PCR. \par Maintain post-transplant diet and crowd restrictions. \par Patient advised to monitor for signs and symptoms of GVHD including but not limited to fever over 101, rash, nausea, vomiting, severe diarrhea, eye pain/dryness. - advised to contact immediately with any developing or worsening signs/symptoms. \par Well care stressed, questions addressed, support provided. \par \par Maintain weekly appointments at this time - pre-scheduled. Next visit on 3/15 with QUAN Guaman.

## 2019-03-08 NOTE — PHYSICAL EXAM
[Ambulatory and capable of all self care but unable to carry out any work activities] : Status 2- Ambulatory and capable of all self care but unable to carry out any work activities. Up and about more than 50% of waking hours [Normal] : affect appropriate [de-identified] : stable gait

## 2019-03-08 NOTE — HISTORY OF PRESENT ILLNESS
[de-identified] : Ms. 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 and was transferred to [facility] where she received R HyperCVAD. Her induction course was complicated by tutu fever. She received cycle 2 mid may. BM BX prior was morphologically remission but pcr was positive. Cycle 4 was completed. Prolonged course with fever, pericardial effusion, possible fungal pna. I suspect fever and effusion due to sprycel. She also had neurologic / mental status changes after MTX. She has an omaya. She had two MTX IT. She feels well today.  s/p step 1 for stem cell mobilization..in CR1..S/P auto stem cell transplant with tam 200 mg/m2 prep...\par \par 10/4/18 - 11/20/18 : 58F hx HTN, Obesity, Ph(+) ALL s/p R Hypercavd x4 cycles IT MTX x2 s/p stem cell collection w/ Step 1(HD vp16 plus arac) stem cell mobilization regime. \par FISH and PCR negative. s/p Tam-Auto on 12/16/16. Patient prior to presentation had severe burning right abdominal pain with vesicular lesions and was diagnosed with shingles and received a 10 day course of antiviral medications. \par Patient on presentation had residual neuropathic pain.  Patient presented to the ER with severe occipital parietal headaches with nausea and vomiting. \par Patient also admitted to easy bruising mouth sores and dark stools. \par CT Head was done for complaints of headache which was (+) for SDH. Neurosurgery was consulted on initial detection of SDH, recommendations to keep platelets >80,000 was made.  LGIB was attributed to profound thrombocytopenia. 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 A PICC line was placed for chemotherapy and was initiated with Inotuzamab on Day 1, 8 and 15. IVF and Allopurinol for TLS. The patient has had refractory thrombocytopenia. H L A platelets were infused when they were available. when H L A platelets were not available 1/2 unit of platelet was infused over 3 hours. \par Follow up CT Head on 10/11 was stable and the patient continued to receive 1/2 units of platelets over 3 hours every 12 hours. The patient received last dose of Inotuzamab Cycle 1 on 10/22. On 10/15 patient was febrile,  a CT of the chest/abd/pelvis was done which showed scattered patchy ground glass opacities in right upper and lower lobes, suggestive of infection, patient received a course of IV antibiotics for Pneumonia. On 11/2 Blood cultures were found to be (+) for Co-agulase (-) Staph and patient received a course of Vancomycin. \par Repeat CT Head was completed on 10/23 for follow up and showed some new bleeding into previous collection. Findings were discussed with Neuro Surgery. \par HLA platelets were administered when available for refractory thrombocytopenia. \par Cycle 2 Inotuzumab was started on 11/6 which was given on Days 1, 8, 15. \par Patient tolerated second cycle without any adverse reactions. Patient for possible future Haploidentical Allogeneic stem cell transplant after discharge. \par Pre-BMT testing completed prior to discharge: Echo, MUGA, Xray sinuses, 24-Hr Urine for Creatinine. \par Patient had intermittent severe headaches on 11/3 and 11/15, repeat CTs showed nearly resolved hemorrhage. Headaches were managed with analgesics Fentanyl patch 37 mcg/hr and Oxycodone IR 15 mg q3 prn prior to discharge home. She is on ponatinib QOD b/c of increased LFT's.   [de-identified] : Patient presents today for follow-up s/p Haploidentical PBSCT on 2/13/19 (son), hospital course below. She was feeling nauseous at visit today, and had one episode of emesis, but felt some relief after taking Reglan. She notes alternating episodes of feeling hot and cold. Denies fever, mouth sores, eye dryness, blurred vision. No CP, SOB or LE edema. She is taking Tacrolimus 1mg BID, and did not take it today. \par \par Discharge summary for transplant (2/07-3/04/19):\par Ms. 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 Her transplant course was complicated by shingles. In October, 2018 she relapsed (confirmed via flow cytometry). She was treated with inotuzumab x 2 cycles. Her course was complicated by subdural hemorrhage, refractory thrombocytopenia requiring HLA matched platelets, pneumonia, and coag negative staph bacteremia (treated with vancomycin). She is now admitted for a haplo-identical peripheral blood stem cell transplant from her son. \par \par Upon admission, a TLC was placed in IR. Ms. Galeas received IV hydration, pain management, antiemetics, nutritional support, antiviral / antibacterial / antifungal / PCP / GI / VOD (SOS) prophylaxis. Labs were monitored on a daily basis, and she received electrolyte repletion and transfusional support as needed. \par \par Ms. Galeas had a relatively uncomplicated transplant course. A baseline CT of the head was done on admission given her history of SDH and refractory thrombocytopenia. The baseline CT was negative. Ms. Galeas did experience pancytopenia related to the high dose chemotherapy preparative regimen. Her thrombocytopenia was refractory, and was managed by infusing 1/2 unit of platelets twice daily over 3 hours. Ms. Galeas also experienced neutropenic fevers. When she became neutropenic, she was started on prophylactic ciprofloxacin. When she developed fevers, blood and urine cultures were sent, a CXR completed and the ciprofloxacin was changed to cefepime. Her cultures and CXR remained negative. \par \par On 2/13/19, Ms. Galeas received 280ml of fresh, mobilized, haplo-identical, HPC apheresis over approximately 1 hour. Cell counts as follows: \par Total MNCs (x 10^8/kg) = 4.36 \par CD34+ cells (x 10^6/kg) = 7.78 \par Cell Viability (%) = 100% \par \par Engraftment was noted on 2/27/19. Post engraftment, the cefepime and zarxio were discontinued. Tacrolimus levels and CMV PCR were monitored twice weekly. A FISH for Y was sent to determine chimerism, with results pending. \par \par Currently, Ms. Galeas is stable for discharge home with outpatient follow up at the [facility]. \par Today she is fatigued, she has some nausea and vomitted in the exam  room

## 2019-03-08 NOTE — ADDENDUM
[FreeTextEntry1] : Documented by Karely Gayle acting as a scribe for Dr. Luke Bentley on 3/07/2019.\par \par All medical record entries made by the Scribe were at my, Dr. Luke Bentley's, direction and personally dictated by me on 3/07/2019. I have reviewed the chart and agree that  the record accurately reflects my personal performance of the history, physical exam, assessment and plan. I have also personally directed, reviewed, and agree with the discharge instructions.\par

## 2019-03-08 NOTE — REVIEW OF SYSTEMS
[Negative] : Allergic/Immunologic [Fatigue] : fatigue [Vomiting] : vomiting [Cough] : no cough [Muscle Weakness] : no muscle weakness [FreeTextEntry9] :  occ back pain [de-identified] : muscle spasms

## 2019-03-10 NOTE — ED PROVIDER NOTE - NS ED MD EM SELECTION
03/10/19 1508   Oxygen Therapy   O2 Sat (%) 97 %   Pulse via Oximetry 70 beats per minute   O2 Device Nasal cannula   O2 Flow Rate (L/min) 4 l/min  (decreased to 2L) [address] Comprehensive

## 2019-03-11 LAB
CHROM ANALY INTERPHASE BLD FISH-IMP: SIGNIFICANT CHANGE UP
CMV DNA SPEC QL NAA+PROBE: <50 IU/ML
CMVPCR LOG: 1.7 LOGIU/ML

## 2019-03-11 NOTE — ED ADULT TRIAGE NOTE - NS ED TRIAGE AVPU SCALE
Alert-The patient is alert, awake and responds to voice. The patient is oriented to time, place, and person. The triage nurse is able to obtain subjective information. [Negative] : Heme/Lymph [Fever] : no fever [Chest Pain] : no chest pain [Shortness Of Breath] : no shortness of breath [Abdominal Pain] : no abdominal pain

## 2019-03-12 ENCOUNTER — APPOINTMENT (OUTPATIENT)
Dept: INFUSION THERAPY | Facility: HOSPITAL | Age: 59
End: 2019-03-12

## 2019-03-15 ENCOUNTER — RESULT REVIEW (OUTPATIENT)
Age: 59
End: 2019-03-15

## 2019-03-15 ENCOUNTER — APPOINTMENT (OUTPATIENT)
Dept: INFUSION THERAPY | Facility: HOSPITAL | Age: 59
End: 2019-03-15

## 2019-03-15 ENCOUNTER — APPOINTMENT (OUTPATIENT)
Dept: HEMATOLOGY ONCOLOGY | Facility: CLINIC | Age: 59
End: 2019-03-15
Payer: MEDICARE

## 2019-03-15 VITALS
WEIGHT: 233.69 LBS | RESPIRATION RATE: 18 BRPM | SYSTOLIC BLOOD PRESSURE: 130 MMHG | BODY MASS INDEX: 41.66 KG/M2 | TEMPERATURE: 99.1 F | DIASTOLIC BLOOD PRESSURE: 72 MMHG | OXYGEN SATURATION: 98 % | HEART RATE: 107 BPM

## 2019-03-15 DIAGNOSIS — L98.8 GRAFT-VERSUS-HOST DISEASE, UNSPECIFIED: ICD-10-CM

## 2019-03-15 DIAGNOSIS — M54.30 SCIATICA, UNSPECIFIED SIDE: ICD-10-CM

## 2019-03-15 DIAGNOSIS — D89.813 GRAFT-VERSUS-HOST DISEASE, UNSPECIFIED: ICD-10-CM

## 2019-03-15 LAB
BASOPHILS NFR BLD AUTO: 1 % — SIGNIFICANT CHANGE UP (ref 0–2)
EOSINOPHIL # BLD AUTO: 0.1 K/UL — SIGNIFICANT CHANGE UP (ref 0–0.5)
EOSINOPHIL NFR BLD AUTO: 1 % — SIGNIFICANT CHANGE UP (ref 0–6)
HCT VFR BLD CALC: 35.5 % — SIGNIFICANT CHANGE UP (ref 34.5–45)
HGB BLD-MCNC: 12.3 G/DL — SIGNIFICANT CHANGE UP (ref 11.5–15.5)
LYMPHOCYTES # BLD AUTO: 0.8 K/UL — LOW (ref 1–3.3)
LYMPHOCYTES # BLD AUTO: 7 % — LOW (ref 13–44)
LYMPHOCYTES # SPEC AUTO: 1 % — HIGH (ref 0–0)
MCHC RBC-ENTMCNC: 33.2 PG — SIGNIFICANT CHANGE UP (ref 27–34)
MCHC RBC-ENTMCNC: 34.6 G/DL — SIGNIFICANT CHANGE UP (ref 32–36)
MCV RBC AUTO: 96 FL — SIGNIFICANT CHANGE UP (ref 80–100)
MONOCYTES # BLD AUTO: 1.2 K/UL — HIGH (ref 0–0.9)
MONOCYTES NFR BLD AUTO: 27 % — HIGH (ref 2–14)
NEUTROPHILS # BLD AUTO: 3.8 K/UL — SIGNIFICANT CHANGE UP (ref 1.8–7.4)
NEUTROPHILS NFR BLD AUTO: 63 % — SIGNIFICANT CHANGE UP (ref 43–77)
PLAT MORPH BLD: NORMAL — SIGNIFICANT CHANGE UP
PLATELET # BLD AUTO: 51 K/UL — LOW (ref 150–400)
RBC # BLD: 3.7 M/UL — LOW (ref 3.8–5.2)
RBC # FLD: 16.6 % — HIGH (ref 10.3–14.5)
RBC BLD AUTO: SIGNIFICANT CHANGE UP
WBC # BLD: 6 K/UL — SIGNIFICANT CHANGE UP (ref 3.8–10.5)
WBC # FLD AUTO: 6 K/UL — SIGNIFICANT CHANGE UP (ref 3.8–10.5)

## 2019-03-15 PROCEDURE — 99214 OFFICE O/P EST MOD 30 MIN: CPT

## 2019-03-15 PROCEDURE — 88291 CYTO/MOLECULAR REPORT: CPT

## 2019-03-15 RX ORDER — MAGNESIUM OXIDE 241.3 MG/1000MG
400 TABLET ORAL 3 TIMES DAILY
Refills: 0 | Status: ACTIVE | COMMUNITY
Start: 2019-03-04

## 2019-03-15 NOTE — REASON FOR VISIT
[Follow-Up Visit] : a follow-up visit for [Acute Lymphoblastic Leukemia] : acute lymphoblastic leukemia [Other: _____] : [unfilled] [FreeTextEntry2] : S/p haplo son SCT on 2/13/19

## 2019-03-15 NOTE — ASSESSMENT
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\par FISH for Y = 100% donor on 3/7/19\par Post transplant BMbx pending\par \par 2) Heme\par Counts stable, no indication for transfusion \par    WBC 6   ANC 3.8   Hgb 12.3   PLT 51\par Continue multivitamin and folic acid\par \par 3) ID\par Continue ppx:\par - Acyclovir 400mg tid\par - Fluconazole 400mg daily\par - Mepron 750mg bid\par Thrush noted 3/15, prescribed mycelex lozenges 5x/day x 7 days\par \par CMV viremia\par 3/7/19 CMV PCR <50\par Continue to monitor wekly\par \par Hx of C Diff infection\par Continue vanco 125mg qid\par \par 4) GVHD\par Skin 1   Liver 0   GI 0 - overall grade 1\par Continue FK 1mg bid - pending today's level\par Continue MMF 1g tid - taper delayed given skin GVHD \par \par Skin GVHD\par Pruritic erythematous rash of lower half of face, neck, upper chest, and upper back (~23.5%) noted 3/15/19\par Apply hydrocortisone cream to affected areas bid\par Instructed to avoid scratching d/t risk for infection \par Pt knows to contact our office if symptoms persist/worsen \par \par 5) GI\par Continue ppx:\par - Protonix 40mg daily\par - Ursodiol 300mg bid\par - PRN Zofran and Reglan for nausea/vomiting\par Diarrhea - likely related to recent C Diff infection and/or MgOx supplementation, continue to monitor closely\par \par 6) Other\par Subdural hemorrhage with headaches - continue ppx Keppra 500mg bid\par HTN - continue losartan 100mg daily and amlodipine 5mg daily\par Hypomagnesemia - likely 2/2 tacrolimus, continue MgOx 400mg tid\par Pain - chronic LBP 2/2 sciatica, continue PRN oxycodone for left knee pain\par \par 7) Plan/Dispo\par Reviewed post transplant restrictions\par Pt educated regarding plan of care, all questions/concerns addressed\par F/u with NP on 3/22/19

## 2019-03-15 NOTE — HISTORY OF PRESENT ILLNESS
[de-identified] : Ms. 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 Her induction course was complicated by tutu fever. She received cycle 2 mid may. BM BX prior was morphologically remission but pcr was positive. Cycle 4 was completed. Prolonged course with fever, pericardial effusion, possible fungal pna. I suspect fever and effusion due to sprycel. She also had neurologic / mental status changes after MTX. She has an omaya. She had two MTX IT. She feels well today.  s/p step 1 for stem cell mobilization..in CR1..S/P auto stem cell transplant with tam 200 mg/m2 prep...\par \par 10/4/18 - 11/20/18 : 58F hx HTN, Obesity, Ph(+) ALL s/p R Hypercavd x4 cycles IT MTX x2 s/p stem cell collection w/ Step 1(HD vp16 plus arac) stem cell mobilization regime. \par FISH and PCR negative. s/p Tam-Auto on 12/16/16. Patient prior to presentation had severe burning right abdominal pain with vesicular lesions and was diagnosed with shingles and received a 10 day course of antiviral medications. \par Patient on presentation had residual neuropathic pain.  Patient presented to the ER with severe occipital parietal headaches with nausea and vomiting. \par Patient also admitted to easy bruising mouth sores and dark stools. \par CT Head was done for complaints of headache which was (+) for SDH. Neurosurgery was consulted on initial detection of SDH, recommendations to keep platelets >80,000 was made.  LGIB was attributed to profound thrombocytopenia. 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 A PICC line was placed for chemotherapy and was initiated with Inotuzamab on Day 1, 8 and 15. IVF and Allopurinol for TLS. The patient has had refractory thrombocytopenia. H L A platelets were infused when they were available. when H L A platelets were not available 1/2 unit of platelet was infused over 3 hours. \par Follow up CT Head on 10/11 was stable and the patient continued to receive 1/2 units of platelets over 3 hours every 12 hours. The patient received last dose of Inotuzamab Cycle 1 on 10/22. On 10/15 patient was febrile,  a CT of the chest/abd/pelvis was done which showed scattered patchy ground glass opacities in right upper and lower lobes, suggestive of infection, patient received a course of IV antibiotics for Pneumonia. On 11/2 Blood cultures were found to be (+) for Co-agulase (-) Staph and patient received a course of Vancomycin. \par Repeat CT Head was completed on 10/23 for follow up and showed some new bleeding into previous collection. Findings were discussed with Neuro Surgery. \par HLA platelets were administered when available for refractory thrombocytopenia. \par Cycle 2 Inotuzumab was started on 11/6 which was given on Days 1, 8, 15. \par Patient tolerated second cycle without any adverse reactions. Patient for possible future Haploidentical Allogeneic stem cell transplant after discharge. \par Pre-BMT testing completed prior to discharge: Echo, MUGA, Xray sinuses, 24-Hr Urine for Creatinine. \par Patient had intermittent severe headaches on 11/3 and 11/15, repeat CTs showed nearly resolved hemorrhage. Headaches were managed with analgesics Fentanyl patch 37 mcg/hr and Oxycodone IR 15 mg q3 prn prior to discharge home. She is on ponatinib QOD b/c of increased LFT's.  \par \par Discharge summary for transplant (2/07-3/04/19):\par Ms. 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 Her transplant course was complicated by shingles. In October, 2018 she relapsed (confirmed via flow cytometry). She was treated with inotuzumab x 2 cycles. Her course was complicated by subdural hemorrhage, refractory thrombocytopenia requiring HLA matched platelets, pneumonia, and coag negative staph bacteremia (treated with vancomycin). She is now admitted for a haplo-identical peripheral blood stem cell transplant from her son. \par \par Upon admission, a TLC was placed in IR. Ms. Galeas received IV hydration, pain management, antiemetics, nutritional support, antiviral / antibacterial / antifungal / PCP / GI / VOD (SOS) prophylaxis. Labs were monitored on a daily basis, and she received electrolyte repletion and transfusional support as needed. \par \par Ms. Galeas had a relatively uncomplicated transplant course. A baseline CT of the head was done on admission given her history of SDH and refractory thrombocytopenia. The baseline CT was negative. Ms. Galeas did experience pancytopenia related to the high dose chemotherapy preparative regimen. Her thrombocytopenia was refractory, and was managed by infusing 1/2 unit of platelets twice daily over 3 hours. Ms. Galeas also experienced neutropenic fevers. When she became neutropenic, she was started on prophylactic ciprofloxacin. When she developed fevers, blood and urine cultures were sent, a CXR completed and the ciprofloxacin was changed to cefepime. Her cultures and CXR remained negative. \par \par On 2/13/19, Ms. Galeas received 280ml of fresh, mobilized, haplo-identical, HPC apheresis over approximately 1 hour. Cell counts as follows: \par Total MNCs (x 10^8/kg) = 4.36 \par CD34+ cells (x 10^6/kg) = 7.78 \par Cell Viability (%) = 100% \par \par Engraftment was noted on 2/27/19. Post engraftment, the cefepime and zarxio were discontinued. Tacrolimus levels and CMV PCR were monitored twice weekly. A FISH for Y was sent to determine chimerism, with results pending. \par \par \par  [de-identified] : 3/15/19 visit, day +30 of SCT today. Ongoing fatigue, occasional dizziness but no falls. Decreased appetite but tolerating adequate PO intake/hydration. Occasional nausea without vomiting, PRN Reglan has been helpful. Small volume watery diarrhea anywhere from 1-3x/day without abdominal pain, hx of C Diff infection in this hospital. New onset pruritic rash on face, neck, upper chest, and upper back. Stable chronic LBP related to sciatica. Ongoing left knee throbbing type pain which started in the hospital after Zarxio, right knee pain resolved but left knee pain persists, pain comes and goes regardless of weightbearing or movement, PRN oxycodone 4 tabs/day has been helpful. Compliant with post transplant meds and restrictions. Currently taking FK 1mg bid which she did not take prior to lab draw today. Denies fever, chills, headaches, blurred vision, mucositis/odynophagia, chest pain, SOB, cough, vomiting, abdominal pain, dysuria, LE edema, bleeding

## 2019-03-15 NOTE — PHYSICAL EXAM
[Ambulatory and capable of all self care but unable to carry out any work activities] : Status 2- Ambulatory and capable of all self care but unable to carry out any work activities. Up and about more than 50% of waking hours [Normal] : affect appropriate [de-identified] : thrush on tongue noted, no erythema or ulcerations  [de-identified] : no edema [de-identified] : no bilateral knee swelling noted [de-identified] : erythematous rash on lower half of face, neck, upper chest, and upper back (~23.5% BSA). Generalized hyperpigmentation

## 2019-03-15 NOTE — REVIEW OF SYSTEMS
[Fatigue] : fatigue [Negative] : Allergic/Immunologic [Fever] : no fever [Chills] : no chills [Vision Problems] : no vision problems [Dysphagia] : no dysphagia [Nosebleeds] : no nosebleeds [Odynophagia] : no odynophagia [Mucosal Pain] : no mucosal pain [Chest Pain] : no chest pain [Lower Ext Edema] : no lower extremity edema [Shortness Of Breath] : no shortness of breath [Cough] : no cough [Abdominal Pain] : no abdominal pain [Vomiting] : no vomiting [Diarrhea] : diarrhea [Dysuria] : no dysuria [Joint Pain] : joint pain [Muscle Weakness] : no muscle weakness [Skin Rash] : skin rash [Dizziness] : no dizziness [Fainting] : no fainting [FreeTextEntry7] : small volume diarrhea 1-3x/day, nausea without vomiting [FreeTextEntry9] : chronic low back pain 2/2 sciatica, ongoing left knee throbbing pain  [de-identified] : pruritic rash on face, neck, upper chest, and upper back

## 2019-03-16 LAB
ALBUMIN SERPL ELPH-MCNC: 4 G/DL
ALP BLD-CCNC: 206 U/L
ALT SERPL-CCNC: 49 U/L
ANION GAP SERPL CALC-SCNC: 14 MMOL/L
AST SERPL-CCNC: 54 U/L
BILIRUB SERPL-MCNC: 0.3 MG/DL
BUN SERPL-MCNC: 20 MG/DL
CALCIUM SERPL-MCNC: 10 MG/DL
CHLORIDE SERPL-SCNC: 100 MMOL/L
CO2 SERPL-SCNC: 20 MMOL/L
CREAT SERPL-MCNC: 1.43 MG/DL
GLUCOSE SERPL-MCNC: 117 MG/DL
LDH SERPL-CCNC: 494 U/L
MAGNESIUM SERPL-MCNC: 2.2 MG/DL
POTASSIUM SERPL-SCNC: 4.7 MMOL/L
PROT SERPL-MCNC: 5.9 G/DL
SODIUM SERPL-SCNC: 134 MMOL/L
TACROLIMUS SERPL-MCNC: 2.8 NG/ML

## 2019-03-18 LAB
CMV DNA SPEC QL NAA+PROBE: 353 IU/ML
CMVPCR LOG: 2.55 LOGIU/ML

## 2019-03-19 ENCOUNTER — APPOINTMENT (OUTPATIENT)
Dept: INFUSION THERAPY | Facility: HOSPITAL | Age: 59
End: 2019-03-19

## 2019-03-19 LAB — CHROM ANALY INTERPHASE BLD FISH-IMP: SIGNIFICANT CHANGE UP

## 2019-03-21 ENCOUNTER — RX RENEWAL (OUTPATIENT)
Age: 59
End: 2019-03-21

## 2019-03-22 ENCOUNTER — APPOINTMENT (OUTPATIENT)
Dept: INFUSION THERAPY | Facility: HOSPITAL | Age: 59
End: 2019-03-22

## 2019-03-22 ENCOUNTER — RESULT REVIEW (OUTPATIENT)
Age: 59
End: 2019-03-22

## 2019-03-22 ENCOUNTER — APPOINTMENT (OUTPATIENT)
Dept: HEMATOLOGY ONCOLOGY | Facility: CLINIC | Age: 59
End: 2019-03-22
Payer: MEDICARE

## 2019-03-22 VITALS
BODY MASS INDEX: 41.78 KG/M2 | OXYGEN SATURATION: 98 % | SYSTOLIC BLOOD PRESSURE: 104 MMHG | WEIGHT: 234.35 LBS | RESPIRATION RATE: 18 BRPM | TEMPERATURE: 98.6 F | DIASTOLIC BLOOD PRESSURE: 66 MMHG | HEART RATE: 107 BPM

## 2019-03-22 DIAGNOSIS — R19.7 DIARRHEA, UNSPECIFIED: ICD-10-CM

## 2019-03-22 LAB
ALBUMIN SERPL ELPH-MCNC: 3.4 G/DL
ALP BLD-CCNC: 223 U/L
ALT SERPL-CCNC: 32 U/L
ANION GAP SERPL CALC-SCNC: 14 MMOL/L
AST SERPL-CCNC: 46 U/L
BASOPHILS # BLD AUTO: 0.1 K/UL — SIGNIFICANT CHANGE UP (ref 0–0.2)
BASOPHILS NFR BLD AUTO: 1.1 % — SIGNIFICANT CHANGE UP (ref 0–2)
BILIRUB SERPL-MCNC: 0.3 MG/DL
BUN SERPL-MCNC: 25 MG/DL
CALCIUM SERPL-MCNC: 9.6 MG/DL
CHLORIDE SERPL-SCNC: 102 MMOL/L
CO2 SERPL-SCNC: 22 MMOL/L
CREAT SERPL-MCNC: 1.71 MG/DL
EOSINOPHIL # BLD AUTO: 0.1 K/UL — SIGNIFICANT CHANGE UP (ref 0–0.5)
EOSINOPHIL NFR BLD AUTO: 0.8 % — SIGNIFICANT CHANGE UP (ref 0–6)
GLUCOSE SERPL-MCNC: 116 MG/DL
HCT VFR BLD CALC: 33.8 % — LOW (ref 34.5–45)
HGB BLD-MCNC: 11.6 G/DL — SIGNIFICANT CHANGE UP (ref 11.5–15.5)
LDH SERPL-CCNC: 639 U/L
LYMPHOCYTES # BLD AUTO: 18.7 % — SIGNIFICANT CHANGE UP (ref 13–44)
LYMPHOCYTES # BLD AUTO: 2.4 K/UL — SIGNIFICANT CHANGE UP (ref 1–3.3)
MAGNESIUM SERPL-MCNC: 2 MG/DL
MCHC RBC-ENTMCNC: 33.1 PG — SIGNIFICANT CHANGE UP (ref 27–34)
MCHC RBC-ENTMCNC: 34.5 G/DL — SIGNIFICANT CHANGE UP (ref 32–36)
MCV RBC AUTO: 96.1 FL — SIGNIFICANT CHANGE UP (ref 80–100)
MONOCYTES # BLD AUTO: 1.3 K/UL — HIGH (ref 0–0.9)
MONOCYTES NFR BLD AUTO: 10.1 % — SIGNIFICANT CHANGE UP (ref 2–14)
NEUTROPHILS # BLD AUTO: 8.8 K/UL — HIGH (ref 1.8–7.4)
NEUTROPHILS NFR BLD AUTO: 69.4 % — SIGNIFICANT CHANGE UP (ref 43–77)
PLATELET # BLD AUTO: 72 K/UL — LOW (ref 150–400)
POTASSIUM SERPL-SCNC: 5.9 MMOL/L
PROT SERPL-MCNC: 5.7 G/DL
RBC # BLD: 3.52 M/UL — LOW (ref 3.8–5.2)
RBC # FLD: 16.4 % — HIGH (ref 10.3–14.5)
SODIUM SERPL-SCNC: 137 MMOL/L
TACROLIMUS SERPL-MCNC: 9.2 NG/ML
WBC # BLD: 12.8 K/UL — HIGH (ref 3.8–10.5)
WBC # FLD AUTO: 12.8 K/UL — HIGH (ref 3.8–10.5)

## 2019-03-22 PROCEDURE — 88291 CYTO/MOLECULAR REPORT: CPT

## 2019-03-22 PROCEDURE — 99214 OFFICE O/P EST MOD 30 MIN: CPT

## 2019-03-22 RX ORDER — CLOTRIMAZOLE 10 MG/1
10 LOZENGE ORAL DAILY
Qty: 35 | Refills: 0 | Status: DISCONTINUED | COMMUNITY
Start: 2019-03-15 | End: 2019-03-22

## 2019-03-26 LAB — CHROM ANALY INTERPHASE BLD FISH-IMP: SIGNIFICANT CHANGE UP

## 2019-03-29 ENCOUNTER — RESULT REVIEW (OUTPATIENT)
Age: 59
End: 2019-03-29

## 2019-03-29 ENCOUNTER — APPOINTMENT (OUTPATIENT)
Dept: HEMATOLOGY ONCOLOGY | Facility: CLINIC | Age: 59
End: 2019-03-29
Payer: MEDICARE

## 2019-03-29 ENCOUNTER — INPATIENT (INPATIENT)
Facility: HOSPITAL | Age: 59
LOS: 20 days | Discharge: HOME | DRG: 919 | End: 2019-04-19
Attending: INTERNAL MEDICINE | Admitting: INTERNAL MEDICINE
Payer: MEDICARE

## 2019-03-29 ENCOUNTER — RX RENEWAL (OUTPATIENT)
Age: 59
End: 2019-03-29

## 2019-03-29 VITALS
HEART RATE: 102 BPM | RESPIRATION RATE: 18 BRPM | WEIGHT: 229.94 LBS | DIASTOLIC BLOOD PRESSURE: 62 MMHG | SYSTOLIC BLOOD PRESSURE: 104 MMHG | OXYGEN SATURATION: 100 % | HEIGHT: 63 IN

## 2019-03-29 VITALS
RESPIRATION RATE: 22 BRPM | WEIGHT: 231.49 LBS | HEART RATE: 115 BPM | OXYGEN SATURATION: 96 % | SYSTOLIC BLOOD PRESSURE: 81 MMHG | DIASTOLIC BLOOD PRESSURE: 41 MMHG | TEMPERATURE: 97.4 F | BODY MASS INDEX: 41.27 KG/M2

## 2019-03-29 DIAGNOSIS — R11.0 NAUSEA: ICD-10-CM

## 2019-03-29 DIAGNOSIS — N17.9 ACUTE KIDNEY FAILURE, UNSPECIFIED: ICD-10-CM

## 2019-03-29 DIAGNOSIS — M25.562 PAIN IN LEFT KNEE: ICD-10-CM

## 2019-03-29 DIAGNOSIS — Z29.9 ENCOUNTER FOR PROPHYLACTIC MEASURES, UNSPECIFIED: ICD-10-CM

## 2019-03-29 DIAGNOSIS — Z76.82 AWAITING ORGAN TRANSPLANT STATUS: ICD-10-CM

## 2019-03-29 DIAGNOSIS — A41.9 SEPSIS, UNSPECIFIED ORGANISM: ICD-10-CM

## 2019-03-29 DIAGNOSIS — Z41.9 ENCOUNTER FOR PROCEDURE FOR PURPOSES OTHER THAN REMEDYING HEALTH STATE, UNSPECIFIED: Chronic | ICD-10-CM

## 2019-03-29 DIAGNOSIS — D17.9 BENIGN LIPOMATOUS NEOPLASM, UNSPECIFIED: Chronic | ICD-10-CM

## 2019-03-29 DIAGNOSIS — A04.72 ENTEROCOLITIS DUE TO CLOSTRIDIUM DIFFICILE, NOT SPECIFIED AS RECURRENT: ICD-10-CM

## 2019-03-29 DIAGNOSIS — I10 ESSENTIAL (PRIMARY) HYPERTENSION: ICD-10-CM

## 2019-03-29 DIAGNOSIS — M54.31 SCIATICA, RIGHT SIDE: ICD-10-CM

## 2019-03-29 DIAGNOSIS — Z09 ENCOUNTER FOR FOLLOW-UP EXAMINATION AFTER COMPLETED TREATMENT FOR CONDITIONS OTHER THAN MALIGNANT NEOPLASM: ICD-10-CM

## 2019-03-29 DIAGNOSIS — Z01.818 ENCOUNTER FOR OTHER PREPROCEDURAL EXAMINATION: ICD-10-CM

## 2019-03-29 DIAGNOSIS — Z98.89 OTHER SPECIFIED POSTPROCEDURAL STATES: Chronic | ICD-10-CM

## 2019-03-29 DIAGNOSIS — D72.829 ELEVATED WHITE BLOOD CELL COUNT, UNSPECIFIED: ICD-10-CM

## 2019-03-29 LAB
ANION GAP SERPL CALC-SCNC: 14 MMOL/L — SIGNIFICANT CHANGE UP (ref 5–17)
ANISOCYTOSIS BLD QL: SLIGHT — SIGNIFICANT CHANGE UP
BASOPHILS # BLD AUTO: 0.1 K/UL — SIGNIFICANT CHANGE UP (ref 0–0.2)
BUN SERPL-MCNC: 62 MG/DL — HIGH (ref 7–23)
CALCIUM SERPL-MCNC: 9.4 MG/DL — SIGNIFICANT CHANGE UP (ref 8.4–10.5)
CHLORIDE SERPL-SCNC: 101 MMOL/L — SIGNIFICANT CHANGE UP (ref 96–108)
CO2 SERPL-SCNC: 18 MMOL/L — LOW (ref 22–31)
CREAT SERPL-MCNC: 3.21 MG/DL — HIGH (ref 0.5–1.3)
DACRYOCYTES BLD QL SMEAR: SLIGHT — SIGNIFICANT CHANGE UP
ELLIPTOCYTES BLD QL SMEAR: SLIGHT — SIGNIFICANT CHANGE UP
EOSINOPHIL # BLD AUTO: 0.1 K/UL — SIGNIFICANT CHANGE UP (ref 0–0.5)
EOSINOPHIL NFR BLD AUTO: 1 % — SIGNIFICANT CHANGE UP (ref 0–6)
GAS PNL BLDV: SIGNIFICANT CHANGE UP
GLUCOSE SERPL-MCNC: 122 MG/DL — HIGH (ref 70–99)
HCT VFR BLD CALC: 34.6 % — SIGNIFICANT CHANGE UP (ref 34.5–45)
HCT VFR BLD CALC: 35.5 % — SIGNIFICANT CHANGE UP (ref 34.5–45)
HGB BLD-MCNC: 11.7 G/DL — SIGNIFICANT CHANGE UP (ref 11.5–15.5)
HGB BLD-MCNC: 12 G/DL — SIGNIFICANT CHANGE UP (ref 11.5–15.5)
HYPOCHROMIA BLD QL: SLIGHT — SIGNIFICANT CHANGE UP
LACTATE BLDV-MCNC: 2.5 MMOL/L — HIGH (ref 0.7–2)
LYMPHOCYTES # BLD AUTO: 20 % — SIGNIFICANT CHANGE UP (ref 13–44)
LYMPHOCYTES # BLD AUTO: 6.8 K/UL — HIGH (ref 1–3.3)
MACROCYTES BLD QL: SLIGHT — SIGNIFICANT CHANGE UP
MAGNESIUM SERPL-MCNC: 2.7 MG/DL — HIGH (ref 1.6–2.6)
MCHC RBC-ENTMCNC: 32.5 PG — SIGNIFICANT CHANGE UP (ref 27–34)
MCHC RBC-ENTMCNC: 33.4 PG — SIGNIFICANT CHANGE UP (ref 27–34)
MCHC RBC-ENTMCNC: 33.7 G/DL — SIGNIFICANT CHANGE UP (ref 32–36)
MCHC RBC-ENTMCNC: 33.8 GM/DL — SIGNIFICANT CHANGE UP (ref 32–36)
MCV RBC AUTO: 96.4 FL — SIGNIFICANT CHANGE UP (ref 80–100)
MCV RBC AUTO: 99 FL — SIGNIFICANT CHANGE UP (ref 80–100)
MICROCYTES BLD QL: SLIGHT — SIGNIFICANT CHANGE UP
MONOCYTES # BLD AUTO: 2.4 K/UL — HIGH (ref 0–0.9)
MONOCYTES NFR BLD AUTO: 14 % — SIGNIFICANT CHANGE UP (ref 2–14)
NEUTROPHILS # BLD AUTO: 12.6 K/UL — HIGH (ref 1.8–7.4)
NEUTROPHILS NFR BLD AUTO: 62 % — SIGNIFICANT CHANGE UP (ref 43–77)
NEUTS BAND # BLD: 3 % — SIGNIFICANT CHANGE UP (ref 0–8)
PHOSPHATE SERPL-MCNC: 4.8 MG/DL — HIGH (ref 2.5–4.5)
PLAT MORPH BLD: NORMAL — SIGNIFICANT CHANGE UP
PLATELET # BLD AUTO: 63 K/UL — LOW (ref 150–400)
PLATELET # BLD AUTO: 72 K/UL — LOW (ref 150–400)
POIKILOCYTOSIS BLD QL AUTO: SLIGHT — SIGNIFICANT CHANGE UP
POLYCHROMASIA BLD QL SMEAR: SLIGHT — SIGNIFICANT CHANGE UP
POTASSIUM SERPL-MCNC: 7.9 MMOL/L — CRITICAL HIGH (ref 3.5–5.3)
POTASSIUM SERPL-SCNC: 7.9 MMOL/L — CRITICAL HIGH (ref 3.5–5.3)
RAPID RVP RESULT: SIGNIFICANT CHANGE UP
RBC # BLD: 3.5 M/UL — LOW (ref 3.8–5.2)
RBC # BLD: 3.68 M/UL — LOW (ref 3.8–5.2)
RBC # FLD: 16.6 % — HIGH (ref 10.3–14.5)
RBC # FLD: 17.1 % — HIGH (ref 10.3–14.5)
RBC BLD AUTO: ABNORMAL
SODIUM SERPL-SCNC: 133 MMOL/L — LOW (ref 135–145)
WBC # BLD: 18.6 K/UL — HIGH (ref 3.8–10.5)
WBC # BLD: 22.1 K/UL — HIGH (ref 3.8–10.5)
WBC # FLD AUTO: 18.6 K/UL — HIGH (ref 3.8–10.5)
WBC # FLD AUTO: 22.1 K/UL — HIGH (ref 3.8–10.5)

## 2019-03-29 PROCEDURE — 99223 1ST HOSP IP/OBS HIGH 75: CPT

## 2019-03-29 PROCEDURE — 99285 EMERGENCY DEPT VISIT HI MDM: CPT

## 2019-03-29 PROCEDURE — 71045 X-RAY EXAM CHEST 1 VIEW: CPT | Mod: 26

## 2019-03-29 PROCEDURE — 86900 BLOOD TYPING SEROLOGIC ABO: CPT

## 2019-03-29 PROCEDURE — 86922 COMPATIBILITY TEST ANTIGLOB: CPT

## 2019-03-29 PROCEDURE — 88275 CYTOGENETICS 100-300: CPT

## 2019-03-29 PROCEDURE — 99215 OFFICE O/P EST HI 40 MIN: CPT | Mod: PD

## 2019-03-29 PROCEDURE — 86850 RBC ANTIBODY SCREEN: CPT

## 2019-03-29 PROCEDURE — 88291 CYTO/MOLECULAR REPORT: CPT

## 2019-03-29 PROCEDURE — 88237 TISSUE CULTURE BONE MARROW: CPT

## 2019-03-29 PROCEDURE — 88271 CYTOGENETICS DNA PROBE: CPT

## 2019-03-29 PROCEDURE — 86901 BLOOD TYPING SEROLOGIC RH(D): CPT

## 2019-03-29 RX ORDER — FOLIC ACID 0.8 MG
1 TABLET ORAL DAILY
Qty: 0 | Refills: 0 | Status: DISCONTINUED | OUTPATIENT
Start: 2019-03-29 | End: 2019-03-29

## 2019-03-29 RX ORDER — MYCOPHENOLATE MOFETIL 250 MG/1
250 CAPSULE ORAL
Refills: 0 | Status: DISCONTINUED | COMMUNITY
Start: 2019-03-04 | End: 2019-03-29

## 2019-03-29 RX ORDER — ATOVAQUONE 750 MG/5ML
750 SUSPENSION ORAL
Qty: 0 | Refills: 0 | Status: DISCONTINUED | OUTPATIENT
Start: 2019-03-29 | End: 2019-04-19

## 2019-03-29 RX ORDER — MYCOPHENOLATE MOFETIL 250 MG/1
1000 CAPSULE ORAL
Qty: 0 | Refills: 0 | Status: DISCONTINUED | OUTPATIENT
Start: 2019-03-29 | End: 2019-04-01

## 2019-03-29 RX ORDER — TRAMADOL HYDROCHLORIDE 50 MG/1
25 TABLET ORAL EVERY 12 HOURS
Qty: 0 | Refills: 0 | Status: DISCONTINUED | OUTPATIENT
Start: 2019-03-29 | End: 2019-04-01

## 2019-03-29 RX ORDER — ACETAMINOPHEN 500 MG
1000 TABLET ORAL ONCE
Qty: 0 | Refills: 0 | Status: COMPLETED | OUTPATIENT
Start: 2019-03-29 | End: 2019-03-29

## 2019-03-29 RX ORDER — SODIUM CHLORIDE 9 MG/ML
1000 INJECTION INTRAMUSCULAR; INTRAVENOUS; SUBCUTANEOUS
Qty: 0 | Refills: 0 | Status: DISCONTINUED | OUTPATIENT
Start: 2019-03-29 | End: 2019-04-01

## 2019-03-29 RX ORDER — HYDROMORPHONE HYDROCHLORIDE 2 MG/ML
1 INJECTION INTRAMUSCULAR; INTRAVENOUS; SUBCUTANEOUS EVERY 6 HOURS
Qty: 0 | Refills: 0 | Status: DISCONTINUED | OUTPATIENT
Start: 2019-03-29 | End: 2019-03-30

## 2019-03-29 RX ORDER — METOCLOPRAMIDE HCL 10 MG
5 TABLET ORAL
Qty: 0 | Refills: 0 | Status: DISCONTINUED | OUTPATIENT
Start: 2019-03-29 | End: 2019-04-03

## 2019-03-29 RX ORDER — FOLIC ACID 0.8 MG
1 TABLET ORAL DAILY
Qty: 0 | Refills: 0 | Status: DISCONTINUED | OUTPATIENT
Start: 2019-03-29 | End: 2019-04-19

## 2019-03-29 RX ORDER — LOSARTAN POTASSIUM 100 MG/1
100 TABLET, FILM COATED ORAL DAILY
Qty: 0 | Refills: 0 | Status: DISCONTINUED | OUTPATIENT
Start: 2019-03-29 | End: 2019-03-29

## 2019-03-29 RX ORDER — LEVETIRACETAM 250 MG/1
500 TABLET, FILM COATED ORAL
Qty: 0 | Refills: 0 | Status: DISCONTINUED | OUTPATIENT
Start: 2019-03-29 | End: 2019-04-19

## 2019-03-29 RX ORDER — CEFEPIME 1 G/1
2000 INJECTION, POWDER, FOR SOLUTION INTRAMUSCULAR; INTRAVENOUS DAILY
Qty: 0 | Refills: 0 | Status: DISCONTINUED | OUTPATIENT
Start: 2019-03-30 | End: 2019-04-03

## 2019-03-29 RX ORDER — VALGANCICLOVIR 450 MG/1
450 TABLET, FILM COATED ORAL DAILY
Qty: 0 | Refills: 0 | Status: DISCONTINUED | OUTPATIENT
Start: 2019-03-29 | End: 2019-04-03

## 2019-03-29 RX ORDER — CEFEPIME 1 G/1
INJECTION, POWDER, FOR SOLUTION INTRAMUSCULAR; INTRAVENOUS
Qty: 0 | Refills: 0 | Status: DISCONTINUED | OUTPATIENT
Start: 2019-03-29 | End: 2019-04-03

## 2019-03-29 RX ORDER — ACYCLOVIR 400 MG/1
400 TABLET ORAL
Refills: 0 | Status: DISCONTINUED | COMMUNITY
Start: 2019-03-04 | End: 2019-03-29

## 2019-03-29 RX ORDER — ONDANSETRON 8 MG/1
8 TABLET, FILM COATED ORAL THREE TIMES A DAY
Qty: 0 | Refills: 0 | Status: DISCONTINUED | OUTPATIENT
Start: 2019-03-29 | End: 2019-04-19

## 2019-03-29 RX ORDER — PANTOPRAZOLE SODIUM 20 MG/1
40 TABLET, DELAYED RELEASE ORAL
Qty: 0 | Refills: 0 | Status: DISCONTINUED | OUTPATIENT
Start: 2019-03-29 | End: 2019-04-10

## 2019-03-29 RX ORDER — URSODIOL 250 MG/1
300 TABLET, FILM COATED ORAL
Qty: 0 | Refills: 0 | Status: DISCONTINUED | OUTPATIENT
Start: 2019-03-29 | End: 2019-04-19

## 2019-03-29 RX ORDER — FLUCONAZOLE 150 MG/1
200 TABLET ORAL DAILY
Qty: 0 | Refills: 0 | Status: DISCONTINUED | OUTPATIENT
Start: 2019-03-29 | End: 2019-04-08

## 2019-03-29 RX ORDER — CEFEPIME 1 G/1
2000 INJECTION, POWDER, FOR SOLUTION INTRAMUSCULAR; INTRAVENOUS ONCE
Qty: 0 | Refills: 0 | Status: COMPLETED | OUTPATIENT
Start: 2019-03-29 | End: 2019-03-29

## 2019-03-29 RX ORDER — SODIUM CHLORIDE 9 MG/ML
1000 INJECTION INTRAMUSCULAR; INTRAVENOUS; SUBCUTANEOUS ONCE
Qty: 0 | Refills: 0 | Status: COMPLETED | OUTPATIENT
Start: 2019-03-29 | End: 2019-03-29

## 2019-03-29 RX ORDER — VANCOMYCIN HCL 1 G
1000 VIAL (EA) INTRAVENOUS ONCE
Qty: 0 | Refills: 0 | Status: COMPLETED | OUTPATIENT
Start: 2019-03-29 | End: 2019-03-29

## 2019-03-29 RX ADMIN — HYDROMORPHONE HYDROCHLORIDE 1 MILLIGRAM(S): 2 INJECTION INTRAMUSCULAR; INTRAVENOUS; SUBCUTANEOUS at 20:30

## 2019-03-29 RX ADMIN — SODIUM CHLORIDE 1000 MILLILITER(S): 9 INJECTION INTRAMUSCULAR; INTRAVENOUS; SUBCUTANEOUS at 15:40

## 2019-03-29 RX ADMIN — LEVETIRACETAM 500 MILLIGRAM(S): 250 TABLET, FILM COATED ORAL at 18:16

## 2019-03-29 RX ADMIN — HYDROMORPHONE HYDROCHLORIDE 1 MILLIGRAM(S): 2 INJECTION INTRAMUSCULAR; INTRAVENOUS; SUBCUTANEOUS at 20:55

## 2019-03-29 RX ADMIN — MYCOPHENOLATE MOFETIL 1000 MILLIGRAM(S): 250 CAPSULE ORAL at 19:50

## 2019-03-29 RX ADMIN — URSODIOL 300 MILLIGRAM(S): 250 TABLET, FILM COATED ORAL at 19:51

## 2019-03-29 RX ADMIN — Medication 250 MILLIGRAM(S): at 18:17

## 2019-03-29 RX ADMIN — SODIUM CHLORIDE 100 MILLILITER(S): 9 INJECTION INTRAMUSCULAR; INTRAVENOUS; SUBCUTANEOUS at 17:38

## 2019-03-29 RX ADMIN — Medication 400 MILLIGRAM(S): at 18:17

## 2019-03-29 RX ADMIN — FLUCONAZOLE 200 MILLIGRAM(S): 150 TABLET ORAL at 22:24

## 2019-03-29 RX ADMIN — ATOVAQUONE 750 MILLIGRAM(S): 750 SUSPENSION ORAL at 19:50

## 2019-03-29 RX ADMIN — CEFEPIME 100 MILLIGRAM(S): 1 INJECTION, POWDER, FOR SOLUTION INTRAMUSCULAR; INTRAVENOUS at 17:39

## 2019-03-29 RX ADMIN — SODIUM CHLORIDE 100 MILLILITER(S): 9 INJECTION INTRAMUSCULAR; INTRAVENOUS; SUBCUTANEOUS at 18:17

## 2019-03-29 NOTE — H&P ADULT - NSICDXPASTMEDICALHX_GEN_ALL_CORE_FT
PAST MEDICAL HISTORY:  Chronic gastritis, presence of bleeding unspecified, unspecified gastritis type     Clostridium difficile diarrhea     Essential hypertension     Herpes zoster     Intractable migraine with status migrainosus, unspecified migraine type     Leukemia     Sciatica of right side

## 2019-03-29 NOTE — ED ADULT NURSE NOTE - OBJECTIVE STATEMENT
58 y/o female presenting to the ED via EMS from [facility] complaining of 2 episodes of vomiting accompanied by hypotension. PMH ALL (last chemo early in march-patient does not remember day), htn. Patient states went to [facility] today to have a routine checkup with labwork when she was noted to be hypotensive to 80s systolic as per EMS. Patient states had two episodes of non bloody emesis at the [facility] and believes it is due to drinking a large amount of water since yesterday (patient states was educated on drinking plenty of water prior to checkup). On arrival patient noted to have blood pressure systolic in the 100s. Patient awake and talking. Patient complaining of 3/10 lower back pain and states "that is my sciatica". Patient denies any chest pain, nausea, diarrhea, numbness, tingling, abdominal pain, fevers, chills, urinary s/s. Patient requesting to be placed on oxygen because "it helps me breath better". Patient SPO2 100% on room air. No retractions noted. No accessory muscle use noted. Patient denies difficulty breathing but states feels slightly SOB today. Multiple attempts at IV access. ED MD Mckeon made aware. ED MD Mckeon and ED MD Jeffry Mensah at bedside for ultrasound access. Cardiac monitor in place. A&OX3. Safety and comfort measures provided. Bed placed in lowest position.

## 2019-03-29 NOTE — H&P ADULT - ASSESSMENT
59 y/o F w/ PMH Ph +  ALL,  s/p R HyperCVAD X 4 cycles, IT MTX X 2, s/p autologous pbsct (12/16/16) and subsequent haploidentical transplant from son on 2/7/2019 (Day +44) who presents to ED  from outpatient follow-up visit with hypotension, elevated WBC and possible recent sick contact.

## 2019-03-29 NOTE — ED ADULT NURSE NOTE - NSIMPLEMENTINTERV_GEN_ALL_ED
Implemented All Fall Risk Interventions:  [place] to call system. Call bell, personal items and telephone within reach. Instruct patient to call for assistance. Room bathroom lighting operational. Non-slip footwear when patient is off stretcher. Physically safe environment: no spills, clutter or unnecessary equipment. Stretcher in lowest position, wheels locked, appropriate side rails in place. Provide visual cue, wrist band, yellow gown, etc. Monitor gait and stability. Monitor for mental status changes and reorient to person, place, and time. Review medications for side effects contributing to fall risk. Reinforce activity limits and safety measures with patient and family.

## 2019-03-29 NOTE — H&P ADULT - ATTENDING COMMENTS
59 y/o F w/ PMH Ph +  ALL,  s/p R HyperCVAD X 4 cycles, IT MTX X 2, s/p autologous pbsct (12/16/16) and subsequent haploidentical transplant from son on 2/7/2019 (Day +44) who presents to ED  from outpatient follow-up visit with hypotension, elevated WBC and possible recent sick contact.     Renally adjusted; Cefepime 2G daily  Vanco 1G once, continue pending blood cultures  Vanco trough in AM.  Hold Tacro pending level from 3/30  Continue MMF 1G PO BID  Most recent FISH for Y on 3/22 100% donor  Continue Actigall for VOD prevention  Continue Keppra for h/o seizures  Conitnue Fluconazole for antifungal ppx  Continue Mepron for PCP ppx  Continue supportive care.

## 2019-03-29 NOTE — H&P ADULT - PROBLEM SELECTOR PLAN 4
CDiff precautions until observation period completed  No indication to repeat test CDiff precautions until observation period completed  No indication to repeat test  Discontinue Vanco PO, course complete

## 2019-03-29 NOTE — REVIEW OF SYSTEMS
[Fatigue] : fatigue [Vomiting] : vomiting [Joint Pain] : joint pain [Hot Flashes] : hot flashes [Negative] : Allergic/Immunologic [Fever] : no fever [Chills] : no chills [Dry Eyes] : no dryness of the eyes [Vision Problems] : no vision problems [Dysphagia] : no dysphagia [Nosebleeds] : no nosebleeds [Odynophagia] : no odynophagia [Mucosal Pain] : no mucosal pain [Chest Pain] : no chest pain [Lower Ext Edema] : no lower extremity edema [Shortness Of Breath] : no shortness of breath [Cough] : no cough [Abdominal Pain] : no abdominal pain [Diarrhea] : no diarrhea [Dysuria] : no dysuria [Muscle Weakness] : no muscle weakness [Dizziness] : no dizziness [Fainting] : no fainting [FreeTextEntry7] : nausea with vomiting x1 today, soft/loose stool up to 2x/day [FreeTextEntry9] : chronic low back pain 2/2 sciatica, ongoing left knee pain is somewhat improved  [de-identified] : stable hot flashes since before transplant

## 2019-03-29 NOTE — ASSESSMENT
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\par FISH for Y = 100% donor on 3/22/19\par Post transplant BMbx pending\par \par 2) Heme\par Progressive leukocytosis, ?infection in setting of hypotension \par    WBC 22.1   ANC 12.6   Hgb 12   PLT 72\par Continue multivitamin and folic acid\par \par 3) ID\par Continue ppx:\par - Acyclovir 400mg tid\par - Fluconazole 200mg daily - renally adjusted\par - Mepron 750mg bid\par \par CMV viremia\par 3/22/19 CMV PCR = 4,889\par Valcyte 450mg bid started 3/29/19\par Continue to monitor weekly\par \par Hx of C Diff infection\par Continue vanco 125mg bid\par \par Suspected sepsis 3/29 in setting of leukoycytosis and hypotension\par Pt taken to [facility] ER via EMS for further w/u and eval\par \par 4) GVHD\par Skin 0   Liver 0   GI 0 - overall stage 0\par Prescribed FK 0.5mg in AM and 1mg in PM but has only been taking 0.5mg daily - f/u today's level for ongoing dosing \par MMF decreased to 1g bid on 3/22/19\par \par Skin GVHD\par Pruritic erythematous rash of lower half of face, neck, upper chest, and upper back (~23.5%) noted 3/15/19\par Resolved as of 3/22/19 with hydrocortisone cream alone\par \par 5) GI\par Continue ppx:\par - Protonix 40mg daily\par - Ursodiol 300mg bid\par - PRN Zofran and Reglan for nausea/vomiting\par Diarrhea - likely related to recent C Diff infection and/or MgOx supplementation, continue to monitor closely\par \par Transaminitis\par Mild transaminitis noted since 3/15/19\par Continue to monitor \par \par 6) Other\par Subdural hemorrhage with headaches - continue ppx Keppra 500mg bid\par HTN - losartan 100mg daily and amlodipine 5mg daily on hold in setting of hypotension today 3/29/19\par Hypomagnesemia - likely 2/2 tacrolimus, continue MgOx 400mg tid\par Pain - chronic LBP 2/2 sciatica, continue PRN oxycodone for left knee pain which is now improved, pursue imaging if pain persists/worsens\par NERI - possibly r/t tacrolimus, encouraged to hydrate with 1.5-2L of fluid daily, continue to monitor and renally adjust meds as needed\par \par 7) Plan/Dispo\par Pt taken to [facility] ER via EMS for suspected sepsis in setting of leukocytosis and hypotension\par F/u when discharged from hospital

## 2019-03-29 NOTE — H&P ADULT - NSHPLABSRESULTS_GEN_ALL_CORE
11.7   18.6  )-----------( 63       ( 29 Mar 2019 15:30 )             34.6     03-29    133<L>  |  101  |  62<H>  ----------------------------<  122<H>  7.9<HH>   |  18<L>  |  3.21<H>    Ca    9.4      29 Mar 2019 15:30  Phos  4.8     03-29  Mg     2.7     03-29

## 2019-03-29 NOTE — REVIEW OF SYSTEMS
[Fatigue] : fatigue [Diarrhea] : diarrhea [Joint Pain] : joint pain [Negative] : Neurological [Fever] : no fever [Chills] : no chills [Vision Problems] : no vision problems [Dysphagia] : no dysphagia [Nosebleeds] : no nosebleeds [Odynophagia] : no odynophagia [Mucosal Pain] : no mucosal pain [Chest Pain] : no chest pain [Lower Ext Edema] : no lower extremity edema [Shortness Of Breath] : no shortness of breath [Cough] : no cough [Abdominal Pain] : no abdominal pain [Vomiting] : no vomiting [Dysuria] : no dysuria [Muscle Weakness] : no muscle weakness [Dizziness] : no dizziness [Fainting] : no fainting [FreeTextEntry7] : nausea without vomiting, intermittent diarrhea 0-3x/day [FreeTextEntry9] : chronic low back pain 2/2 sciatica, ongoing left knee throbbing pain

## 2019-03-29 NOTE — HISTORY OF PRESENT ILLNESS
[de-identified] : Ms. Galeas is a 59 year old female who was dx with ph+ ALL in april 2016...she initially presented to [facility] and was transferred to [facility] where she received R HyperCVAD. Her induction course was complicated by tutu fever. She received cycle 2 mid may. BM BX prior was morphologically remission but pcr was positive. Cycle 4 was completed. Prolonged course with fever, pericardial effusion, possible fungal pna. I suspect fever and effusion due to sprycel. She also had neurologic / mental status changes after MTX. She has an omaya. She had two MTX IT. She feels well today.  s/p step 1 for stem cell mobilization..in CR1..S/P auto stem cell transplant with tam 200 mg/m2 prep...\par \par 10/4/18 - 11/20/18 : 58F hx HTN, Obesity, Ph(+) ALL s/p R Hypercavd x4 cycles IT MTX x2 s/p stem cell collection w/ Step 1(HD vp16 plus arac) stem cell mobilization regime. \par FISH and PCR negative. s/p Tam-Auto on 12/16/16. Patient prior to presentation had severe burning right abdominal pain with vesicular lesions and was diagnosed with shingles and received a 10 day course of antiviral medications. \par Patient on presentation had residual neuropathic pain.  Patient presented to the ER with severe occipital parietal headaches with nausea and vomiting. \par Patient also admitted to easy bruising mouth sores and dark stools. \par CT Head was done for complaints of headache which was (+) for SDH. Neurosurgery was consulted on initial detection of SDH, recommendations to keep platelets >80,000 was made.  LGIB was attributed to profound thrombocytopenia. The patient was transferred to [address] [place] [address] and upon confirmation of peripheral blood flow the patient was noted to have relapsed B-ALL Ph (+). A PICC line was placed for chemotherapy and was initiated with Inotuzamab on Day 1, 8 and 15. IVF and Allopurinol for TLS. The patient has had refractory thrombocytopenia. H L A platelets were infused when they were available. when H L A platelets were not available 1/2 unit of platelet was infused over 3 hours. \par Follow up CT Head on 10/11 was stable and the patient continued to receive 1/2 units of platelets over 3 hours every 12 hours. The patient received last dose of Inotuzamab Cycle 1 on 10/22. On 10/15 patient was febrile,  a CT of the chest/abd/pelvis was done which showed scattered patchy ground glass opacities in right upper and lower lobes, suggestive of infection, patient received a course of IV antibiotics for Pneumonia. On 11/2 Blood cultures were found to be (+) for Co-agulase (-) Staph and patient received a course of Vancomycin. \par Repeat CT Head was completed on 10/23 for follow up and showed some new bleeding into previous collection. Findings were discussed with Neuro Surgery. \par HLA platelets were administered when available for refractory thrombocytopenia. \par Cycle 2 Inotuzumab was started on 11/6 which was given on Days 1, 8, 15. \par Patient tolerated second cycle without any adverse reactions. Patient for possible future Haploidentical Allogeneic stem cell transplant after discharge. \par Pre-BMT testing completed prior to discharge: Echo, MUGA, Xray sinuses, 24-Hr Urine for Creatinine. \par Patient had intermittent severe headaches on 11/3 and 11/15, repeat CTs showed nearly resolved hemorrhage. Headaches were managed with analgesics Fentanyl patch 37 mcg/hr and Oxycodone IR 15 mg q3 prn prior to discharge home. She is on ponatinib QOD b/c of increased LFT's.  \par \par Discharge summary for transplant (2/07-3/04/19):\par Ms. 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 Her transplant course was complicated by shingles. In October, 2018 she relapsed (confirmed via flow cytometry). She was treated with inotuzumab x 2 cycles. Her course was complicated by subdural hemorrhage, refractory thrombocytopenia requiring HLA matched platelets, pneumonia, and coag negative staph bacteremia (treated with vancomycin). She is now admitted for a haplo-identical peripheral blood stem cell transplant from her son. \par \par Upon admission, a TLC was placed in IR. Ms. Galeas received IV hydration, pain management, antiemetics, nutritional support, antiviral / antibacterial / antifungal / PCP / GI / VOD (SOS) prophylaxis. Labs were monitored on a daily basis, and she received electrolyte repletion and transfusional support as needed. \par \par Ms. Galeas had a relatively uncomplicated transplant course. A baseline CT of the head was done on admission given her history of SDH and refractory thrombocytopenia. The baseline CT was negative. Ms. Galeas did experience pancytopenia related to the high dose chemotherapy preparative regimen. Her thrombocytopenia was refractory, and was managed by infusing 1/2 unit of platelets twice daily over 3 hours. Ms. Galeas also experienced neutropenic fevers. When she became neutropenic, she was started on prophylactic ciprofloxacin. When she developed fevers, blood and urine cultures were sent, a CXR completed and the ciprofloxacin was changed to cefepime. Her cultures and CXR remained negative. \par \par On 2/13/19, Ms. Galeas received 280ml of fresh, mobilized, haplo-identical, HPC apheresis over approximately 1 hour. Cell counts as follows: \par Total MNCs (x 10^8/kg) = 4.36 \par CD34+ cells (x 10^6/kg) = 7.78 \par Cell Viability (%) = 100% \par \par Engraftment was noted on 2/27/19. Post engraftment, the cefepime and zarxio were discontinued. Tacrolimus levels and CMV PCR were monitored twice weekly. A FISH for Y was sent to determine chimerism, with results pending. \par \par \par  [de-identified] : 3/15/19 visit, day +30 of SCT today. Ongoing fatigue, occasional dizziness but no falls. Decreased appetite but tolerating adequate PO intake/hydration. Occasional nausea without vomiting, PRN Reglan has been helpful. Small volume watery diarrhea anywhere from 1-3x/day without abdominal pain, hx of C Diff infection in this hospital. New onset pruritic rash on face, neck, upper chest, and upper back. Stable chronic LBP related to sciatica. Ongoing left knee throbbing type pain which started in the hospital after Zarxio, right knee pain resolved but left knee pain persists, pain comes and goes regardless of weightbearing or movement, PRN oxycodone 4 tabs/day has been helpful. Compliant with post transplant meds and restrictions. Currently taking FK 1mg bid which she did not take prior to lab draw today. Denies fever, chills, headaches, blurred vision, mucositis/odynophagia, chest pain, SOB, cough, vomiting, abdominal pain, dysuria, LE edema, bleeding\par \par On 3/22/19 visit, day +37 of SCT today. Ongoing fatigue. Pruritic rash has resolved with hydrocortisone cream. Dysgeusia with significant dry mouth, reportedly adequate PO intake and hydration, urine reportedly light yellow to clear. Intermittent nausea without vomiting, PRN anti-emetics helpful. Intermittent diarrhea 0-3x/day though not every day, notes it is worse after taking vancomycin. Stable chronic low back pain with right sided sciatica. Left thigh bone pain resolved but ongoing left knee pain, using PRN oxycodone 10mg bid with adequate pain control. Knee pain is worse with weight bearing and certain movements. Compliant with post transplant meds and restrictions. Currently taking FK 1mg in AM and 1.5mg in PM which she did not take prior to lab draw today. Denies fever, chills, headaches, blurred vision, mucositis/odynophagia, chest pain, SOB, cough, abdominal pain/cramping, dysuria, LE edema, rash, bleeding\par \par On 3/29/19 visit, day +44 of SCT today. Asymptomatic hypotension today with BP of 81/41, unable to obtain a manual BP despite multiple attempts in office. Very tired today but reports over past week energy level waxes and wanes with good days and tougher days. Intermittent hot flashes which she had prior to transplant. Decreased appetite and PO intake though reported adequate hydration. Occasional nausea without vomiting at home, however vomiting x1 in office today which she attributes to drinking too much water prior to lab draw. Soft/Loose stools up to 2x/day, no abdominal pain. Left knee pain is somewhat improved, she did not go for xray imaging last week d/t long wait time. Compliant with post transplant meds a restrictions. Currently taking FK 0.5mg daily d/t miscommunication with her daughter-in-law who was instructed that pt should take 0.5mg in AM and 1mg in PM. Denies fever, chills, headaches, dizziness, blurred vision, mucositis/odynophagia, chest pain, SOB, cough, watery diarrhea, abdominal pain, dysuria, LE edema, rash, bleeding

## 2019-03-29 NOTE — H&P ADULT - PROBLEM SELECTOR PLAN 2
Day +44 today  Hold Tacro pending level from 3/30  Continue MMF 1G PO BID  Most recent FISH for Y on 3/22 100% donor

## 2019-03-29 NOTE — ASSESSMENT
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\par FISH for Y = 100% donor on 3/15/19\par Post transplant BMbx pending\par \par 2) Heme\par Elevated WBC noted, otherwise counts stable, no indication for transfusion \par    WBC 12.8   ANC 8.8   Hgb 11.6   PLT 72\par Continue multivitamin and folic acid\par \par 3) ID\par Continue ppx:\par - Acyclovir 400mg tid\par - Fluconazole 200mg daily - renally adjusted\par - Mepron 750mg bid\par \par CMV viremia\par 3/15/19 CMV PCR = 353 (previously <50)\par Continue to monitor wekly\par \par Hx of C Diff infection\par Continue vanco 125mg bid\par \par 4) GVHD\par Skin 0   Liver 0   GI 0 - overall grade 0\par FK = 9.2 today with worsening NERI, FK decreased to 0.5mg in AM and 1mg in PM as of today 3/22.19\par MMF decreased to 1g bid on 3/22/19\par \par Skin GVHD\par Pruritic erythematous rash of lower half of face, neck, upper chest, and upper back (~23.5%) noted 3/15/19\par Resolved as of 3/22/19 with hydrocortisone cream alone\par \par 5) GI\par Continue ppx:\par - Protonix 40mg daily\par - Ursodiol 300mg bid\par - PRN Zofran and Reglan for nausea/vomiting\par Diarrhea - likely related to recent C Diff infection and/or MgOx supplementation, continue to monitor closely\par \par Transaminitis\par Mild transaminitis noted 3/15/19\par Continue to monitor \par \par 6) Other\par Subdural hemorrhage with headaches - continue ppx Keppra 500mg bid\par HTN - continue losartan 100mg daily and amlodipine 5mg daily\par Hypomagnesemia - likely 2/2 tacrolimus, continue MgOx 400mg tid\par Pain - chronic LBP 2/2 sciatica, continue PRN oxycodone for left knee pain. F/u left knee Xray done today 3/22/19\par NERI - possibly r/t tacrolimus, encouraged to hydrate with 1.5-2L of fluid daily, continue to monitor and renally adjust meds as needed\par Dry mouth - encouraged to use Biotene mouthwash, OTC artificial saliva spray, hard candy (i.e. lemon drops)\par \par 7) Plan/Dispo\par Reviewed post transplant restrictions\par Pt educated regarding plan of care, all questions/concerns addressed\par F/u with NP on 3/29/19

## 2019-03-29 NOTE — PHYSICAL EXAM
[Ambulatory and capable of all self care but unable to carry out any work activities] : Status 2- Ambulatory and capable of all self care but unable to carry out any work activities. Up and about more than 50% of waking hours [Obese] : obese [Normal] : affect appropriate [de-identified] : tachycardic, regular rhythm, normal s1s2 [de-identified] : no edema [de-identified] : no knee erythema or swelling noted, ambulates with a cane

## 2019-03-29 NOTE — ED PROVIDER NOTE - PROGRESS NOTE DETAILS
Stable patient with systolic pressures in the 100s -- will perform infectious work up and admit to BMT. Discussed with NP Patient transiently became hypotensive to the 80s, ordered for 1 liter fluid bolus

## 2019-03-29 NOTE — ED ADULT NURSE REASSESSMENT NOTE - NS ED NURSE REASSESS COMMENT FT1
Patient complaining of pain at IV site in right upper extremity. IV noted to be flushing with resistance. ED MD Sherrie Markham made aware. Per ED MD Sherrie Markham to establish new IV at this time with US. Safety and comfort measures provided. Patient awaiting RVP result. A&OX3. Vitals stable at this time. Safety and comfort measures provided.

## 2019-03-29 NOTE — H&P ADULT - HISTORY OF PRESENT ILLNESS
Ms. 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 Her transplant course was complicated by shingles. In October, 2018 she relapsed (confirmed via flow cytometry). She was treated with inotuzumab x 2 cycles. Her course was complicated by subdural hemorrhage, refractory thrombocytopenia requiring HLA matched platelets, pneumonia, and coag negative staph bacteremia (treated with vancomycin). She was admitted on 2/7/2019 for a haplo-identical peripheral blood stem cell transplant from her son.   Ms. Galeas had a relatively uncomplicated transplant course. A baseline CT of the head was done on admission given her history of SDH and refractory thrombocytopenia which was negative. Ms. Galeas did experience pancytopenia related to the high dose chemotherapy preparative regimen. Her thrombocytopenia was refractory, and was managed by infusing 1/2 unit of platelets twice daily over 3 hours. When she became neutropenic, she was started on prophylactic ciprofloxacin. When she developed fevers, blood and urine cultures were sent, a CXR completed and the ciprofloxacin was changed to cefepime. Her cultures and CXR remained negative.   On 2/13/19, Ms. Galeas received 280ml of fresh, mobilized, haplo-identical, HPC apheresis over approximately 1 hour. Cell counts as follows:   Total MNCs (x 10^8/kg) = 4.36   CD34+ cells (x 10^6/kg) = 7.78   Cell Viability (%) = 100%   Engraftment was noted on 2/27/19 and she was sent home on Tacrolimus and Cellcept with frequent monitoring in the outpatient setting.  Most recently Ms. Galeas was being followed at [facility] weekly for blood counts and Tacrolimus levels.  She is in the process of being weaned off Cellcept and is currently taking 1G PO BID.  At this morning's visit she admitted to not having taken her correct dose of Tacrolimus for the last few days and her level was found to be subtherapeutic at 2.3.  Her CMV level was being followed and was found to be in the 4000's During routine vitals she was found to be hypotensive to 80's/40s.  Her WBC count was 22K with a normal differential and she was sent to [facility] ED for infectious work-up and to rule out sepsis. She admits to having a possible sick contact as her 2 year old granddaughter sounded "congested" and she was hugging and kissing her yesterday.  She denies fever, chills, rigors, dyspnea, cough or sputum, ongoing vomiting, diarrhea, abdominal pain, dysuria or flank pain.  Her urine is clear yellow.  She reports a resolved pruritic rash around her neck which she applies hydrocortisone cream to.  She has stable chronic low back pain, right sided sciatica and left knee pain which she takes oxycodone 10 mg PO bid with relief.  She reports recent thrush for which she was treated but she is unsure which medication.  She continues on fluconazole and acyclovir for prophylaxis. Ms. 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 Her transplant course was complicated by shingles. In October, 2018 she relapsed (confirmed via flow cytometry). She was treated with inotuzumab x 2 cycles. Her course was complicated by subdural hemorrhage, refractory thrombocytopenia requiring HLA matched platelets, pneumonia, and coag negative staph bacteremia (treated with vancomycin). She was admitted on 2/7/2019 for a haplo-identical peripheral blood stem cell transplant from her son.   Ms. Galeas had a relatively uncomplicated transplant course. A baseline CT of the head was done on admission given her history of SDH and refractory thrombocytopenia which was negative. Ms. Galeas did experience pancytopenia related to the high dose chemotherapy preparative regimen. Her thrombocytopenia was refractory, and was managed by infusing 1/2 unit of platelets twice daily over 3 hours. When she became neutropenic, she was started on prophylactic ciprofloxacin. When she developed fevers, blood and urine cultures were sent, a CXR completed and the ciprofloxacin was changed to cefepime. Her cultures and CXR remained negative.   On 2/13/19, Ms. Galeas received 280ml of fresh, mobilized, haplo-identical, HPC apheresis over approximately 1 hour. Cell counts as follows:   Total MNCs (x 10^8/kg) = 4.36   CD34+ cells (x 10^6/kg) = 7.78   Cell Viability (%) = 100%   Engraftment was noted on 2/27/19 and she was sent home on Tacrolimus and Cellcept with frequent monitoring in the outpatient setting.  Most recently Ms. Galeas was being followed at [facility] weekly for blood counts and Tacrolimus levels.  She is in the process of being weaned off Cellcept and is currently taking 1G PO BID.  At this morning's visit she admitted to not having taken her correct dose of Tacrolimus for the last few days and her level was found to be subtherapeutic at 2.3.  Her CMV level was being followed and was found to be 4889 on 3/22, Valcyte 450 mg PO BID was to be started today.  During routine vitals she was found to be hypotensive to 80's/40s.  Her WBC count was 22K with a normal differential and she was sent to [facility] ED for infectious work-up and rule out sepsis. She admits to having a possible sick contact as her 2 year old granddaughter sounded "congested" and she was hugging and kissing her yesterday.  She denies fever, chills, rigors, dyspnea, cough or sputum, ongoing vomiting, diarrhea, abdominal pain, dysuria or flank pain.  Her urine is clear yellow.  She reports a resolved pruritic rash around her neck which she applies hydrocortisone cream to.  She has stable chronic low back pain, right sided sciatica and left knee pain which she takes oxycodone 10 mg PO bid with relief.  She reports recent thrush for which she was treated but she is unsure which medication.  She continues on fluconazole, acyclovir and Mepron for prophylaxis.  SH has a distant history of CDiff and is being weaned of Vanco PO.  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  In the ED she again became hypotensive to 80s/40s and was given a 1L NS bolus.  Blood cultures, CXR, RVP pending.  CMP showing NERI. Ms. 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 Her transplant course was complicated by shingles. In October, 2018 she relapsed (confirmed via flow cytometry). She was treated with inotuzumab x 2 cycles. Her course was complicated by subdural hemorrhage, refractory thrombocytopenia requiring HLA matched platelets, pneumonia, and coag negative staph bacteremia (treated with vancomycin). She was admitted on 2/7/2019 for a haplo-identical peripheral blood stem cell transplant from her son.   Ms. Galeas had a relatively uncomplicated transplant course. A baseline CT of the head was done on admission given her history of SDH and refractory thrombocytopenia which was negative. Ms. Galeas did experience pancytopenia related to the high dose chemotherapy preparative regimen. Her thrombocytopenia was refractory, and was managed by infusing 1/2 unit of platelets twice daily over 3 hours. When she became neutropenic, she was started on prophylactic ciprofloxacin. When she developed fevers, blood and urine cultures were sent, a CXR completed and the ciprofloxacin was changed to cefepime. Her cultures and CXR remained negative.   On 2/13/19, Ms. Galeas received 280ml of fresh, mobilized, haplo-identical, HPC apheresis over approximately 1 hour. Cell counts as follows:   Total MNCs (x 10^8/kg) = 4.36   CD34+ cells (x 10^6/kg) = 7.78   Cell Viability (%) = 100%   Engraftment was noted on 2/27/19 and she was sent home on Tacrolimus and Cellcept with frequent monitoring in the outpatient setting.  Most recently Ms. Galeas was being followed at [facility] weekly for blood counts and Tacrolimus levels.  She is in the process of being weaned off Cellcept and is currently taking 1G PO BID.  Most recent chimerism was 100% donor on 3/22.  At this morning's visit she admitted to not having taken her correct dose of Tacrolimus for the last few days and her level was found to be subtherapeutic at 2.3.  Her CMV level was being followed and was found to be 4889 on 3/22, Valcyte 450 mg PO BID was to be started today.  During routine vitals she was found to be hypotensive to 80's/40s.  Her WBC count was 22K with a normal differential and she was sent to [facility] ED for infectious work-up and rule out sepsis. She admits to having a possible sick contact as her 2 year old granddaughter sounded "congested" and she was hugging and kissing her yesterday.  She denies fever, chills, rigors, dyspnea, cough or sputum, ongoing vomiting, diarrhea, abdominal pain, dysuria or flank pain.  Her urine is clear yellow.  She reports a resolved pruritic rash around her neck which she applies hydrocortisone cream to.  She has stable chronic low back pain, right sided sciatica and left knee pain which she takes oxycodone 10 mg PO bid with relief.  She reports recent thrush for which she was treated but she is unsure which medication.  She continues on fluconazole, acyclovir and Mepron for prophylaxis.  She has a distant history of CDiff and is being weaned of Vanco PO.  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  In the ED she again became hypotensive to 80s/40s and was given a 1L NS bolus.  Blood cultures, CXR, RVP pending.  CMP showing NERI. Ms. 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 Her transplant course was complicated by shingles. In October, 2018 she relapsed (confirmed via flow cytometry). She was treated with inotuzumab x 2 cycles. Her course was complicated by subdural hemorrhage, refractory thrombocytopenia requiring HLA matched platelets, pneumonia, and coag negative staph bacteremia (treated with vancomycin). She was admitted on 2/7/2019 for a haplo-identical peripheral blood stem cell transplant from her son.   Ms. Galeas had a relatively uncomplicated transplant course. A baseline CT of the head was done on admission given her history of SDH and refractory thrombocytopenia which was negative. Ms. Galeas did experience pancytopenia related to the high dose chemotherapy preparative regimen. Her thrombocytopenia was refractory, and was managed by infusing 1/2 unit of platelets twice daily over 3 hours. When she became neutropenic, she was started on prophylactic ciprofloxacin. When she developed fevers, blood and urine cultures were sent, a CXR completed and the ciprofloxacin was changed to cefepime. Her cultures and CXR remained negative.   On 2/13/19, Ms. Galeas received 280ml of fresh, mobilized, haplo-identical, HPC apheresis over approximately 1 hour. Cell counts as follows:   Total MNCs (x 10^8/kg) = 4.36   CD34+ cells (x 10^6/kg) = 7.78   Cell Viability (%) = 100%   Engraftment was noted on 2/27/19 and she was sent home on Tacrolimus and Cellcept with frequent monitoring in the outpatient setting.  Most recently Ms. Galeas was being followed at [facility] weekly for blood counts and Tacrolimus levels.  She is in the process of being weaned off Cellcept and is currently taking 1G PO BID.  Most recent chimerism was 100% donor on 3/22.  At this morning's visit she admitted to not having taken her correct dose of Tacrolimus for the last few days and her level was found to be subtherapeutic at 2.3.  Her CMV level was being followed and was found to be 4889 on 3/22, Valcyte 450 mg PO BID was to be started today.  During routine vitals she was found to be hypotensive to 80's/40s.  Her WBC count was 22K with a normal differential and she was sent to [facility] ED for infectious work-up and rule out sepsis. She admits to having a possible sick contact, her 2 year old granddaughter sounded "congested" and she was hugging and kissing her yesterday.  She denies fever, chills, rigors, dyspnea, cough or sputum, ongoing vomiting, diarrhea, abdominal pain, dysuria or flank pain.  Her urine is clear yellow.  She reports a resolved pruritic rash around her neck which she applies hydrocortisone cream to.  She has stable chronic low back pain, right sided sciatica and left knee pain which she takes oxycodone 10 mg PO bid with relief.  She reports recent thrush for which she was treated but she is unsure which medication.  She continues on fluconazole, acyclovir and Mepron for prophylaxis.  She has a distant history of CDiff and is being weaned of Vanco PO.  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  In the ED she again became hypotensive to 80s/40s and was given a 1L NS bolus.  Blood cultures, CXR, RVP pending.  CMP showing NERI. Ms. 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 Her transplant course was complicated by shingles. In October, 2018 she relapsed (confirmed via flow cytometry). She was treated with inotuzumab x 2 cycles. Her course was complicated by subdural hemorrhage, refractory thrombocytopenia requiring HLA matched platelets, pneumonia, and coag negative staph bacteremia (treated with vancomycin). She was admitted on 2/7/2019 for a haplo-identical peripheral blood stem cell transplant from her son.   Ms. Galeas had a relatively uncomplicated transplant course. A baseline CT of the head was done on admission given her history of SDH and refractory thrombocytopenia which was negative. Ms. Galeas did experience pancytopenia related to the high dose chemotherapy preparative regimen. Her thrombocytopenia was refractory, and was managed by infusing 1/2 unit of platelets twice daily over 3 hours. When she became neutropenic, she was started on prophylactic ciprofloxacin. When she developed fevers, blood and urine cultures were sent, a CXR completed and the ciprofloxacin was changed to cefepime. Her cultures and CXR remained negative.   On 2/13/19, Ms. Galeas received 280ml of fresh, mobilized, haplo-identical, HPC apheresis over approximately 1 hour. Cell counts as follows:   Total MNCs (x 10^8/kg) = 4.36   CD34+ cells (x 10^6/kg) = 7.78   Cell Viability (%) = 100%   Engraftment was noted on 2/27/19 and she was sent home on Tacrolimus and Cellcept with frequent monitoring in the outpatient setting.  Most recently Ms. Galeas was being followed at [facility] weekly for blood counts and Tacrolimus levels.  She is in the process of being weaned off Cellcept and is currently taking 1G PO BID.  Most recent chimerism was 100% donor on 3/22.  At this morning's visit she admitted to not having taken her correct dose of Tacrolimus for the last few days and her level was found to be subtherapeutic at 2.3.  Her CMV level was being followed and was found to be 4889 on 3/22, Valcyte 450 mg PO BID was to be started today.  During routine vitals she was found to be hypotensive to 80's/40s.  Her WBC count was 22K with a normal differential and she was sent to [facility] ED for infectious work-up and rule out sepsis. She admits to having a possible sick contact, her 2 year old granddaughter sounded "congested" and she was hugging and kissing her yesterday.  She reports having been extremely fatigued this AM and threw up once after drinking a lot of water which is typical for her.  She denies fever, chills, rigors, dyspnea, cough or sputum, ongoing vomiting, diarrhea, abdominal pain, dysuria or flank pain.  Her urine is clear yellow.  She reports a resolved pruritic rash around her neck which she applies hydrocortisone cream to.  She has stable chronic low back pain, right sided sciatica and left knee pain which she takes oxycodone 10 mg PO bid with relief.  She reports recent thrush for which she was treated but she is unsure which medication.  She continues on fluconazole, acyclovir and Mepron for prophylaxis.  She has a distant history of CDiff and is being weaned of Vanco PO.  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  In the ED she again became hypotensive to 80s/40s and was given a 1L NS bolus.  Blood cultures, CXR, RVP pending.  CMP showing NERI.

## 2019-03-29 NOTE — HISTORY OF PRESENT ILLNESS
[de-identified] : Ms. 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 Her induction course was complicated by tutu fever. She received cycle 2 mid may. BM BX prior was morphologically remission but pcr was positive. Cycle 4 was completed. Prolonged course with fever, pericardial effusion, possible fungal pna. I suspect fever and effusion due to sprycel. She also had neurologic / mental status changes after MTX. She has an omaya. She had two MTX IT. She feels well today.  s/p step 1 for stem cell mobilization..in CR1..S/P auto stem cell transplant with tam 200 mg/m2 prep...\par \par 10/4/18 - 11/20/18 : 58F hx HTN, Obesity, Ph(+) ALL s/p R Hypercavd x4 cycles IT MTX x2 s/p stem cell collection w/ Step 1(HD vp16 plus arac) stem cell mobilization regime. \par FISH and PCR negative. s/p Tam-Auto on 12/16/16. Patient prior to presentation had severe burning right abdominal pain with vesicular lesions and was diagnosed with shingles and received a 10 day course of antiviral medications. \par Patient on presentation had residual neuropathic pain.  Patient presented to the ER with severe occipital parietal headaches with nausea and vomiting. \par Patient also admitted to easy bruising mouth sores and dark stools. \par CT Head was done for complaints of headache which was (+) for SDH. Neurosurgery was consulted on initial detection of SDH, recommendations to keep platelets >80,000 was made.  LGIB was attributed to profound thrombocytopenia. 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 A PICC line was placed for chemotherapy and was initiated with Inotuzamab on Day 1, 8 and 15. IVF and Allopurinol for TLS. The patient has had refractory thrombocytopenia. H L A platelets were infused when they were available. when H L A platelets were not available 1/2 unit of platelet was infused over 3 hours. \par Follow up CT Head on 10/11 was stable and the patient continued to receive 1/2 units of platelets over 3 hours every 12 hours. The patient received last dose of Inotuzamab Cycle 1 on 10/22. On 10/15 patient was febrile,  a CT of the chest/abd/pelvis was done which showed scattered patchy ground glass opacities in right upper and lower lobes, suggestive of infection, patient received a course of IV antibiotics for Pneumonia. On 11/2 Blood cultures were found to be (+) for Co-agulase (-) Staph and patient received a course of Vancomycin. \par Repeat CT Head was completed on 10/23 for follow up and showed some new bleeding into previous collection. Findings were discussed with Neuro Surgery. \par HLA platelets were administered when available for refractory thrombocytopenia. \par Cycle 2 Inotuzumab was started on 11/6 which was given on Days 1, 8, 15. \par Patient tolerated second cycle without any adverse reactions. Patient for possible future Haploidentical Allogeneic stem cell transplant after discharge. \par Pre-BMT testing completed prior to discharge: Echo, MUGA, Xray sinuses, 24-Hr Urine for Creatinine. \par Patient had intermittent severe headaches on 11/3 and 11/15, repeat CTs showed nearly resolved hemorrhage. Headaches were managed with analgesics Fentanyl patch 37 mcg/hr and Oxycodone IR 15 mg q3 prn prior to discharge home. She is on ponatinib QOD b/c of increased LFT's.  \par \par Discharge summary for transplant (2/07-3/04/19):\par Ms. 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 Her transplant course was complicated by shingles. In October, 2018 she relapsed (confirmed via flow cytometry). She was treated with inotuzumab x 2 cycles. Her course was complicated by subdural hemorrhage, refractory thrombocytopenia requiring HLA matched platelets, pneumonia, and coag negative staph bacteremia (treated with vancomycin). She is now admitted for a haplo-identical peripheral blood stem cell transplant from her son. \par \par Upon admission, a TLC was placed in IR. Ms. Galeas received IV hydration, pain management, antiemetics, nutritional support, antiviral / antibacterial / antifungal / PCP / GI / VOD (SOS) prophylaxis. Labs were monitored on a daily basis, and she received electrolyte repletion and transfusional support as needed. \par \par Ms. Galeas had a relatively uncomplicated transplant course. A baseline CT of the head was done on admission given her history of SDH and refractory thrombocytopenia. The baseline CT was negative. Ms. Galeas did experience pancytopenia related to the high dose chemotherapy preparative regimen. Her thrombocytopenia was refractory, and was managed by infusing 1/2 unit of platelets twice daily over 3 hours. Ms. Galeas also experienced neutropenic fevers. When she became neutropenic, she was started on prophylactic ciprofloxacin. When she developed fevers, blood and urine cultures were sent, a CXR completed and the ciprofloxacin was changed to cefepime. Her cultures and CXR remained negative. \par \par On 2/13/19, Ms. Galeas received 280ml of fresh, mobilized, haplo-identical, HPC apheresis over approximately 1 hour. Cell counts as follows: \par Total MNCs (x 10^8/kg) = 4.36 \par CD34+ cells (x 10^6/kg) = 7.78 \par Cell Viability (%) = 100% \par \par Engraftment was noted on 2/27/19. Post engraftment, the cefepime and zarxio were discontinued. Tacrolimus levels and CMV PCR were monitored twice weekly. A FISH for Y was sent to determine chimerism, with results pending. \par \par \par  [de-identified] : 3/15/19 visit, day +30 of SCT today. Ongoing fatigue, occasional dizziness but no falls. Decreased appetite but tolerating adequate PO intake/hydration. Occasional nausea without vomiting, PRN Reglan has been helpful. Small volume watery diarrhea anywhere from 1-3x/day without abdominal pain, hx of C Diff infection in this hospital. New onset pruritic rash on face, neck, upper chest, and upper back. Stable chronic LBP related to sciatica. Ongoing left knee throbbing type pain which started in the hospital after Zarxio, right knee pain resolved but left knee pain persists, pain comes and goes regardless of weightbearing or movement, PRN oxycodone 4 tabs/day has been helpful. Compliant with post transplant meds and restrictions. Currently taking FK 1mg bid which she did not take prior to lab draw today. Denies fever, chills, headaches, blurred vision, mucositis/odynophagia, chest pain, SOB, cough, vomiting, abdominal pain, dysuria, LE edema, bleeding\par \par On 3/22/19 visit, day +37 of SCT today. Ongoing fatigue. Pruritic rash has resolved with hydrocortisone cream. Dysgeusia with significant dry mouth, reportedly adequate PO intake and hydration, urine reportedly light yellow to clear. Intermittent nausea without vomiting, PRN anti-emetics helpful. Intermittent diarrhea 0-3x/day though not every day, notes it is worse after taking vancomycin. Stable chronic low back pain with right sided sciatica. Left thigh bone pain resolved but ongoing left knee pain, using PRN oxycodone 10mg bid with adequate pain control. Knee pain is worse with weight bearing and certain movements. Compliant with post transplant meds and restrictions. Currently taking FK 1mg in AM and 1.5mg in PM which she did not take prior to lab draw today. Denies fever, chills, headaches, blurred vision, mucositis/odynophagia, chest pain, SOB, cough, abdominal pain/cramping, dysuria, LE edema, rash, bleeding

## 2019-03-29 NOTE — H&P ADULT - PROBLEM SELECTOR PLAN 3
SA-NERI  Continue IVF  Renally dose all medications  Hold Tacro today  Hold Magnesium supplement until f/u CMP  Renal consult if not improved with IVF SA-NERI  Continue IVF  Renally dose all medications  Hold Tacro today  Hold Magnesium supplement until f/u CMP  Renal consult if not improved with IVF  Urine lytes  Serum osm in AM

## 2019-03-29 NOTE — H&P ADULT - NSICDXPASTSURGICALHX_GEN_ALL_CORE_FT
PAST SURGICAL HISTORY:  Elective surgical procedure ommaya placement    History of  delivery     Lipoma left side

## 2019-03-29 NOTE — H&P ADULT - PROBLEM SELECTOR PLAN 6
Tramadol 25 mg PO BID  No oxy/dilaudid given hypotension Tramadol 25 mg PO BID  Would avoid oxy/dilaudid given hypotension

## 2019-03-29 NOTE — ED ADULT NURSE NOTE - CHPI ED NUR SYMPTOMS NEG
no decreased eating/drinking/no chills/no fever/no weakness/no vomiting/no dizziness/no nausea/no tingling

## 2019-03-29 NOTE — ED PROVIDER NOTE - CLINICAL SUMMARY MEDICAL DECISION MAKING FREE TEXT BOX
59 year old female with hx of ALL (Ph+), YONG from [facility] for hypotension with leukocytosis. Will perform infectious work up. Admit to BMT 59 year old female with hx of ALL (Ph+), BIBEMS from [facility] for hypotension with leukocytosis. Will perform infectious work up. Admit to BMT  Luis Carlos: hypotension, wbc count, has cancer. no fever. will look for infectious source. hydrate. awake alert. Concern for sepsis in somewhat immunocompromised pt.

## 2019-03-29 NOTE — ED ADULT NURSE REASSESSMENT NOTE - NS ED NURSE REASSESS COMMENT FT1
New IV established in LAC. Antibiotics infusing as per order. Patient denies any dizziness, n/v/d, numbness, tingling, chest pain, SOB at this time. A&OX3. RVP still pending. Safety and comfort measures provided.

## 2019-03-29 NOTE — ED PROVIDER NOTE - PHYSICAL EXAMINATION
General: middle aged female, NAD  HEENT: PERRL, EOMI, MMM  Neck: Supple, no JVD  Cardiac: RRR, normal s1 and s2   Lungs: CTAB, no wheezes, rales or rhonchi   Abdomen: Soft, nondistended abdomen. Bowel sounds present. non TTP  Extremities: no cyanosis, pitting or edema   Neuro: AAO x 3, Cr II - XII intact, 5/5 strength in UE and LE, sensation intact   Skin: no open lesions or sores

## 2019-03-29 NOTE — H&P ADULT - PROBLEM SELECTOR PLAN 1
Cefepime 2G daily  Vanco 1G once pending blood culture results  F/U CXR, prelim clear  Start Valcyte 450 mg PO daily, F/U CMV pcr from 3/30, discontinue Acyclovir  Continue renally dosed Fluconazole, full dose Mepron  F/U RVP  F/U blood cultures, urine culture  IVF  Hold Norvasc in setting of hypotension Cefepime 2G daily  Vanco 1G once pending blood culture results  F/U CXR, prelim clear  Start Valcyte 450 mg PO daily, F/U CMV pcr from 3/30, discontinue Acyclovir  Continue renally dosed Fluconazole, full dose Mepron  F/U RVP  F/U blood cultures, urine culture  IVF  Hold antihypertensives in setting of hypotension Cefepime 2G daily  Vanco 1G once, continue pending blood cultures  Vanco trough in AM in event of need to continue daily dose by level  F/U CXR, prelim clear  Start Valcyte 450 mg PO daily, F/U CMV pcr from 3/30, discontinue Acyclovir  Continue renally dosed Fluconazole, full dose Mepron  F/U RVP  F/U blood cultures, urine culture  IVF  Hold antihypertensives in setting of hypotension

## 2019-03-29 NOTE — ED CLERICAL - NS ED CLERK NOTE PRE-ARRIVAL INFORMATION; ADDITIONAL PRE-ARRIVAL INFORMATION
CC/Reason For referral: ALL, white count 22, possible infection  Preferred Consultant(if applicable): bone marrow transplant service  Who admits for you (if needed): Dr. Rene [phone]  Do you have documents you would like to fax over?  Would you still like to speak to an ED attending?

## 2019-03-29 NOTE — PHYSICAL EXAM
[Ambulatory and capable of all self care but unable to carry out any work activities] : Status 2- Ambulatory and capable of all self care but unable to carry out any work activities. Up and about more than 50% of waking hours [Normal] : normal appearance, no rash, nodules, vesicles, ulcers, erythema [de-identified] : no edema [de-identified] : no knee erythema or swelling noted, ambulates with a cane

## 2019-03-30 LAB
ALBUMIN SERPL ELPH-MCNC: 2.6 G/DL — LOW (ref 3.3–5)
ALP SERPL-CCNC: 190 U/L — HIGH (ref 40–120)
ALT FLD-CCNC: 23 U/L — SIGNIFICANT CHANGE UP (ref 10–45)
ANION GAP SERPL CALC-SCNC: 14 MMOL/L — SIGNIFICANT CHANGE UP (ref 5–17)
AST SERPL-CCNC: 44 U/L — HIGH (ref 10–40)
BASOPHILS # BLD AUTO: 0.1 K/UL — SIGNIFICANT CHANGE UP (ref 0–0.2)
BASOPHILS NFR BLD AUTO: 0.5 % — SIGNIFICANT CHANGE UP (ref 0–2)
BILIRUB SERPL-MCNC: 0.2 MG/DL — SIGNIFICANT CHANGE UP (ref 0.2–1.2)
BLD GP AB SCN SERPL QL: NEGATIVE — SIGNIFICANT CHANGE UP
BUN SERPL-MCNC: 58 MG/DL — HIGH (ref 7–23)
CALCIUM SERPL-MCNC: 8.5 MG/DL — SIGNIFICANT CHANGE UP (ref 8.4–10.5)
CHLORIDE SERPL-SCNC: 105 MMOL/L — SIGNIFICANT CHANGE UP (ref 96–108)
CO2 SERPL-SCNC: 17 MMOL/L — LOW (ref 22–31)
CREAT SERPL-MCNC: 2.48 MG/DL — HIGH (ref 0.5–1.3)
EOSINOPHIL # BLD AUTO: 0 K/UL — SIGNIFICANT CHANGE UP (ref 0–0.5)
EOSINOPHIL NFR BLD AUTO: 0.4 % — SIGNIFICANT CHANGE UP (ref 0–6)
GLUCOSE BLDC GLUCOMTR-MCNC: 102 MG/DL — HIGH (ref 70–99)
GLUCOSE SERPL-MCNC: 130 MG/DL — HIGH (ref 70–99)
HCT VFR BLD CALC: 29 % — LOW (ref 34.5–45)
HGB BLD-MCNC: 10 G/DL — LOW (ref 11.5–15.5)
LDH SERPL L TO P-CCNC: 479 U/L — HIGH (ref 50–242)
LYMPHOCYTES # BLD AUTO: 28.8 % — SIGNIFICANT CHANGE UP (ref 13–44)
LYMPHOCYTES # BLD AUTO: 3.1 K/UL — SIGNIFICANT CHANGE UP (ref 1–3.3)
MAGNESIUM SERPL-MCNC: 2.4 MG/DL — SIGNIFICANT CHANGE UP (ref 1.6–2.6)
MCHC RBC-ENTMCNC: 34 PG — SIGNIFICANT CHANGE UP (ref 27–34)
MCHC RBC-ENTMCNC: 34.3 GM/DL — SIGNIFICANT CHANGE UP (ref 32–36)
MCV RBC AUTO: 99.1 FL — SIGNIFICANT CHANGE UP (ref 80–100)
MONOCYTES # BLD AUTO: 1.2 K/UL — HIGH (ref 0–0.9)
MONOCYTES NFR BLD AUTO: 10.8 % — SIGNIFICANT CHANGE UP (ref 2–14)
NEUTROPHILS # BLD AUTO: 6.4 K/UL — SIGNIFICANT CHANGE UP (ref 1.8–7.4)
NEUTROPHILS NFR BLD AUTO: 59.5 % — SIGNIFICANT CHANGE UP (ref 43–77)
OSMOLALITY SERPL: 301 MOS/KG — HIGH (ref 275–300)
PHOSPHATE SERPL-MCNC: 3.9 MG/DL — SIGNIFICANT CHANGE UP (ref 2.5–4.5)
PLATELET # BLD AUTO: 46 K/UL — LOW (ref 150–400)
POTASSIUM SERPL-MCNC: 5.5 MMOL/L — HIGH (ref 3.5–5.3)
POTASSIUM SERPL-SCNC: 5.5 MMOL/L — HIGH (ref 3.5–5.3)
PROT SERPL-MCNC: 4.6 G/DL — LOW (ref 6–8.3)
RBC # BLD: 2.93 M/UL — LOW (ref 3.8–5.2)
RBC # FLD: 16.9 % — HIGH (ref 10.3–14.5)
RH IG SCN BLD-IMP: POSITIVE — SIGNIFICANT CHANGE UP
SODIUM SERPL-SCNC: 136 MMOL/L — SIGNIFICANT CHANGE UP (ref 135–145)
TACROLIMUS SERPL-MCNC: <2 NG/ML — SIGNIFICANT CHANGE UP
VANCOMYCIN TROUGH SERPL-MCNC: 6.7 UG/ML — LOW (ref 10–20)
WBC # BLD: 10.8 K/UL — HIGH (ref 3.8–10.5)
WBC # FLD AUTO: 10.8 K/UL — HIGH (ref 3.8–10.5)

## 2019-03-30 PROCEDURE — 99232 SBSQ HOSP IP/OBS MODERATE 35: CPT | Mod: GC

## 2019-03-30 RX ORDER — HYDROMORPHONE HYDROCHLORIDE 2 MG/ML
0.5 INJECTION INTRAMUSCULAR; INTRAVENOUS; SUBCUTANEOUS EVERY 6 HOURS
Qty: 0 | Refills: 0 | Status: DISCONTINUED | OUTPATIENT
Start: 2019-03-30 | End: 2019-04-01

## 2019-03-30 RX ADMIN — MYCOPHENOLATE MOFETIL 1000 MILLIGRAM(S): 250 CAPSULE ORAL at 09:01

## 2019-03-30 RX ADMIN — ONDANSETRON 8 MILLIGRAM(S): 8 TABLET, FILM COATED ORAL at 12:07

## 2019-03-30 RX ADMIN — URSODIOL 300 MILLIGRAM(S): 250 TABLET, FILM COATED ORAL at 17:34

## 2019-03-30 RX ADMIN — FLUCONAZOLE 200 MILLIGRAM(S): 150 TABLET ORAL at 12:07

## 2019-03-30 RX ADMIN — SODIUM CHLORIDE 100 MILLILITER(S): 9 INJECTION INTRAMUSCULAR; INTRAVENOUS; SUBCUTANEOUS at 06:50

## 2019-03-30 RX ADMIN — ATOVAQUONE 750 MILLIGRAM(S): 750 SUSPENSION ORAL at 17:34

## 2019-03-30 RX ADMIN — HYDROMORPHONE HYDROCHLORIDE 0.5 MILLIGRAM(S): 2 INJECTION INTRAMUSCULAR; INTRAVENOUS; SUBCUTANEOUS at 11:27

## 2019-03-30 RX ADMIN — ATOVAQUONE 750 MILLIGRAM(S): 750 SUSPENSION ORAL at 06:50

## 2019-03-30 RX ADMIN — HYDROMORPHONE HYDROCHLORIDE 1 MILLIGRAM(S): 2 INJECTION INTRAMUSCULAR; INTRAVENOUS; SUBCUTANEOUS at 04:35

## 2019-03-30 RX ADMIN — Medication 1 TABLET(S): at 12:07

## 2019-03-30 RX ADMIN — LEVETIRACETAM 500 MILLIGRAM(S): 250 TABLET, FILM COATED ORAL at 17:34

## 2019-03-30 RX ADMIN — HYDROMORPHONE HYDROCHLORIDE 0.5 MILLIGRAM(S): 2 INJECTION INTRAMUSCULAR; INTRAVENOUS; SUBCUTANEOUS at 23:35

## 2019-03-30 RX ADMIN — ONDANSETRON 8 MILLIGRAM(S): 8 TABLET, FILM COATED ORAL at 16:24

## 2019-03-30 RX ADMIN — Medication 1 MILLIGRAM(S): at 12:07

## 2019-03-30 RX ADMIN — PANTOPRAZOLE SODIUM 40 MILLIGRAM(S): 20 TABLET, DELAYED RELEASE ORAL at 06:50

## 2019-03-30 RX ADMIN — LEVETIRACETAM 500 MILLIGRAM(S): 250 TABLET, FILM COATED ORAL at 06:50

## 2019-03-30 RX ADMIN — MYCOPHENOLATE MOFETIL 1000 MILLIGRAM(S): 250 CAPSULE ORAL at 17:34

## 2019-03-30 RX ADMIN — HYDROMORPHONE HYDROCHLORIDE 0.5 MILLIGRAM(S): 2 INJECTION INTRAMUSCULAR; INTRAVENOUS; SUBCUTANEOUS at 17:33

## 2019-03-30 RX ADMIN — URSODIOL 300 MILLIGRAM(S): 250 TABLET, FILM COATED ORAL at 06:50

## 2019-03-30 RX ADMIN — VALGANCICLOVIR 450 MILLIGRAM(S): 450 TABLET, FILM COATED ORAL at 12:07

## 2019-03-30 RX ADMIN — HYDROMORPHONE HYDROCHLORIDE 0.5 MILLIGRAM(S): 2 INJECTION INTRAMUSCULAR; INTRAVENOUS; SUBCUTANEOUS at 11:12

## 2019-03-30 RX ADMIN — HYDROMORPHONE HYDROCHLORIDE 0.5 MILLIGRAM(S): 2 INJECTION INTRAMUSCULAR; INTRAVENOUS; SUBCUTANEOUS at 17:49

## 2019-03-30 RX ADMIN — CEFEPIME 100 MILLIGRAM(S): 1 INJECTION, POWDER, FOR SOLUTION INTRAMUSCULAR; INTRAVENOUS at 12:06

## 2019-03-30 RX ADMIN — HYDROMORPHONE HYDROCHLORIDE 1 MILLIGRAM(S): 2 INJECTION INTRAMUSCULAR; INTRAVENOUS; SUBCUTANEOUS at 04:15

## 2019-03-30 NOTE — PROGRESS NOTE ADULT - ATTENDING COMMENTS
59 yo F with PMH Ph + ALL, S/P R- HyperCVAD chemo x  cycles, IT MTX x 2, S/P autologous PBSCT (12/16/16) complicated by shingles infection and past subdural hemorrhage and relapsed disease, who received haploidentical allogeneic stem cell transplant from son with Flu/Cy/TBI conditioning on 2/13/19 (now Day +45). She had MRD positivity on 1/23/19. She was admitted for hypotension from [facility] and has CMV >4000 and started on Valcyte.    Continuing on tacrolimus and MMF as well.

## 2019-03-30 NOTE — PROGRESS NOTE ADULT - SUBJECTIVE AND OBJECTIVE BOX
[facility] Transplant Team                                                      Critical / Counseling Time Provided: 30 minutes                                                                                                                                                        Chief Complaint:     S: Patient seen and examined with [facility] Transplant Team:   Denies mouth / tongue / throat pain, dyspnea, cough, nausea, vomiting, diarrhea, abdominal pain     O: Vitals:   Vital Signs Last 24 Hrs  T(C): 35.9 (30 Mar 2019 06:45), Max: 36.7 (29 Mar 2019 14:46)  T(F): 96.6 (30 Mar 2019 06:45), Max: 98.1 (29 Mar 2019 14:46)  HR: 101 (30 Mar 2019 06:45) (87 - 108)  BP: 99/66 (30 Mar 2019 06:45) (84/59 - 115/72)  BP(mean): 86 (29 Mar 2019 18:19) (66 - 86)  RR: 18 (30 Mar 2019 06:45) (15 - 18)  SpO2: 98% (30 Mar 2019 06:45) (98% - 100%)    Admit weight:   Daily Height in cm: 160.02 (29 Mar 2019 14:25)    Daily     Intake / Output:   03-29 @ 07:01  -  03-30 @ 07:00  --------------------------------------------------------  IN: 1455 mL / OUT: 500 mL / NET: 955 mL          PE:   Oropharynx:   Oral Mucositis:                                                        Grade:   CVS:   Lungs:   Abdomen:  Extremities:   Gastric Mucositis:                                                  Grade:   Intestinal Mucositis:                                              Grade:   Skin:   TLC:   Neuro:   Pain:     Labs:   CBC Full  -  ( 29 Mar 2019 15:30 )  WBC Count : 18.6 K/uL  Hemoglobin : 11.7 g/dL  Hematocrit : 34.6 %  Platelet Count - Automated : 63 K/uL  Mean Cell Volume : 99.0 fl  Mean Cell Hemoglobin : 33.4 pg  Mean Cell Hemoglobin Concentration : 33.8 gm/dL  Auto Neutrophil # : x  Auto Lymphocyte # : x  Auto Monocyte # : x  Auto Eosinophil # : x  Auto Basophil # : x  Auto Neutrophil % : x  Auto Lymphocyte % : x  Auto Monocyte % : x  Auto Eosinophil % : x  Auto Basophil % : x                          11.7   18.6  )-----------( 63       ( 29 Mar 2019 15:30 )             34.6     03-29    133<L>  |  101  |  62<H>  ----------------------------<  122<H>  7.9<HH>   |  18<L>  |  3.21<H>    Ca    9.4      29 Mar 2019 15:30  Phos  4.8     03-29  Mg     2.7     03-29                  Karnofsky / Lansky Scale:   GVHD:   Skin:   Liver:   Gut:   Overall Grade:       Cultures:         Radiology:       Meds:   Antimicrobials:   atovaquone Suspension 750 milliGRAM(s) Oral two times a day  cefepime   IVPB 2000 milliGRAM(s) IV Intermittent daily  cefepime   IVPB      fluconAZOLE   Tablet 200 milliGRAM(s) Oral daily  valGANciclovir 450 milliGRAM(s) Oral daily      Heme / Onc:       GI:  metoclopramide 5 milliGRAM(s) Oral Before meals and at bedtime PRN  pantoprazole    Tablet 40 milliGRAM(s) Oral before breakfast  ursodiol Capsule 300 milliGRAM(s) Oral two times a day      Cardiovascular:       Immunologic:   mycophenolate mofetil 1000 milliGRAM(s) Oral two times a day      Other medications:   folic acid 1 milliGRAM(s) Oral daily  levETIRAcetam 500 milliGRAM(s) Oral two times a day  multivitamin 1 Tablet(s) Oral daily  sodium chloride 0.9%. 1000 milliLiter(s) IV Continuous <Continuous>      PRN:   metoclopramide 5 milliGRAM(s) Oral Before meals and at bedtime PRN  ondansetron    Tablet 8 milliGRAM(s) Oral three times a day PRN  traMADol 25 milliGRAM(s) Oral every 12 hours PRN      A/P:   ___ year old ___  with a history of ______________________  Pre / Status Post :  Autologous / Allogeneic PBSCT / BMT day ____________    1. Infectious Disease:   Fluconazole, Acyclovir     2. VOD Prophylaxis: Actigall, Glutamine, Heparin (dosed at 100 units / kg / day)     3. GI Prophylaxis:  Protonix    4. Mouthcare - NS / NaHCO3 rinses, Mycelex, Caphosol, skin care     5. GVHD prophylaxis     6. Transfuse & replete electrolytes prn     7. IV hydration, daily weights, strict I&O, prn diuresis     8. PO intake as tolerated, nutrition follow up as needed, MVI, folic acid     9. Antiemetics, anti-diarrhea medications:   Reglan, Ativan    10. OOB as tolerated, physical therapy consult if needed     11. Monitor coags / fibrinogen 2x week, vitamin K as needed     12. Monitor closely for clinical changes, monitor for fevers     13. Emotional support provided, plan of care discussed and questions addressed     14. Patient education done regarding chemotherapy prep, plan of care, restrictions and discharge planning     15. Continue regular social work input     I have written the above note for Dr. Logan who performed service with me in the room.   Janett Quezada NP-C ([phone])    I have seen and examined patient with NP, I agree with above note as scribed. 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      Patient seen and examined with [facility] Transplant Team:   Denies mouth / tongue / throat pain, dyspnea, cough, nausea, vomiting, diarrhea, abdominal pain     O: Vitals:   Vital Signs Last 24 Hrs  T(C): 35.9 (30 Mar 2019 06:45), Max: 36.7 (29 Mar 2019 14:46)  T(F): 96.6 (30 Mar 2019 06:45), Max: 98.1 (29 Mar 2019 14:46)  HR: 101 (30 Mar 2019 06:45) (87 - 108)  BP: 99/66 (30 Mar 2019 06:45) (84/59 - 115/72)  BP(mean): 86 (29 Mar 2019 18:19) (66 - 86)  RR: 18 (30 Mar 2019 06:45) (15 - 18)  SpO2: 98% (30 Mar 2019 06:45) (98% - 100%)    Admit weight: 104.3kg  Daily Height in cm: 160.02 (29 Mar 2019 14:25)    Daily     Intake / Output:   03-29 @ 07:01  -  03-30 @ 07:00  --------------------------------------------------------  IN: 1455 mL / OUT: 500 mL / NET: 955 mL          PE:   Oropharynx: Clear  Oral Mucositis:   clear                                                     Grade:   CVS: RRR  Lungs: CTA  Abdomen: + BS, SNT  Extremities: warm and dry no swelling  Gastric Mucositis:                                                  Grade:   Intestinal Mucositis:                                              Grade:   Skin: No rashes  TLC: PIV  Neuro: Awake alert and oriented  Pain: Denies    LABS:                         Meds:   Antimicrobials:   atovaquone Suspension 750 milliGRAM(s) Oral two times a day  cefepime   IVPB 2000 milliGRAM(s) IV Intermittent daily  cefepime   IVPB      fluconAZOLE   Tablet 200 milliGRAM(s) Oral daily  valGANciclovir 450 milliGRAM(s) Oral daily      GI:  metoclopramide 5 milliGRAM(s) Oral Before meals and at bedtime PRN  pantoprazole    Tablet 40 milliGRAM(s) Oral before breakfast  ursodiol Capsule 300 milliGRAM(s) Oral two times a day      Immunologic:   mycophenolate mofetil 1000 milliGRAM(s) Oral two times a day      Other medications:   folic acid 1 milliGRAM(s) Oral daily  levETIRAcetam 500 milliGRAM(s) Oral two times a day  multivitamin 1 Tablet(s) Oral daily  sodium chloride 0.9%. 1000 milliLiter(s) IV Continuous <Continuous>      PRN:   metoclopramide 5 milliGRAM(s) Oral Before meals and at bedtime PRN  ondansetron    Tablet 8 milliGRAM(s) Oral three times a day PRN  traMADol 25 milliGRAM(s) Oral every 12 hours PRN      A/P:   59 year old F with a history of relapsed ALL   Status Post Allogeneic PBSCT Feb 2019 now presents with low BP and diarrhea.     1. Infectious Disease:   Fluconazole, Acyclovir     2. VOD Prophylaxis: Actigall, Glutamine, Heparin (dosed at 100 units / kg / day)     3. GI Prophylaxis:  Protonix    4. Mouthcare - NS / NaHCO3 rinses, Mycelex, Caphosol, skin care     5. GVHD prophylaxis     6. Transfuse & replete electrolytes prn     7. IV hydration, daily weights, strict I&O, prn diuresis     8. PO intake as tolerated, nutrition follow up as needed, MVI, folic acid     9. Antiemetics, anti-diarrhea medications:   Reglan, Ativan    10. OOB as tolerated, physical therapy consult if needed     11. Monitor coags / fibrinogen 2x week, vitamin K as needed     12. Monitor closely for clinical changes, monitor for fevers     13. Emotional support provided, plan of care discussed and questions addressed     14. Patient education done regarding chemotherapy prep, plan of care, restrictions and discharge planning     15. Continue regular social work input     I have written the above note for Dr. Watson who performed service with me in the room.   Nataliya Martinez NP-C ([phone])    I have seen and examined patient with NP, I agree with above note as scribed. 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      Patient seen and examined with hospitals Transplant Team: Diarrhea improved.   Denies mouth / tongue / throat pain, dyspnea, cough, nausea, vomiting, diarrhea, abdominal pain     O: Vitals:   Vital Signs Last 24 Hrs  T(C): 35.9 (30 Mar 2019 06:45), Max: 36.7 (29 Mar 2019 14:46)  T(F): 96.6 (30 Mar 2019 06:45), Max: 98.1 (29 Mar 2019 14:46)  HR: 101 (30 Mar 2019 06:45) (87 - 108)  BP: 99/66 (30 Mar 2019 06:45) (84/59 - 115/72)  BP(mean): 86 (29 Mar 2019 18:19) (66 - 86)  RR: 18 (30 Mar 2019 06:45) (15 - 18)  SpO2: 98% (30 Mar 2019 06:45) (98% - 100%)    Admit weight: 104.3kg  Daily Height in cm: 160.02 (29 Mar 2019 14:25)    Daily     Intake / Output:   03-29 @ 07:01  -  03-30 @ 07:00  --------------------------------------------------------  IN: 1455 mL / OUT: 500 mL / NET: 955 mL          PE:   Oropharynx: Clear  Oral Mucositis:   clear                                                     Grade:   CVS: RRR  Lungs: CTA  Abdomen: + BS, SNT  Extremities: warm and dry no swelling  Gastric Mucositis:                                                  Grade:   Intestinal Mucositis:                                              Grade:   Skin: No rashes  TLC: PIV  Neuro: Awake alert and oriented  Pain: Denies    LABS:                         Meds:   Antimicrobials:   atovaquone Suspension 750 milliGRAM(s) Oral two times a day  cefepime   IVPB 2000 milliGRAM(s) IV Intermittent daily  cefepime   IVPB      fluconAZOLE   Tablet 200 milliGRAM(s) Oral daily  valGANciclovir 450 milliGRAM(s) Oral daily      GI:  metoclopramide 5 milliGRAM(s) Oral Before meals and at bedtime PRN  pantoprazole    Tablet 40 milliGRAM(s) Oral before breakfast  ursodiol Capsule 300 milliGRAM(s) Oral two times a day      Immunologic:   mycophenolate mofetil 1000 milliGRAM(s) Oral two times a day      Other medications:   folic acid 1 milliGRAM(s) Oral daily  levETIRAcetam 500 milliGRAM(s) Oral two times a day  multivitamin 1 Tablet(s) Oral daily  sodium chloride 0.9%. 1000 milliLiter(s) IV Continuous <Continuous>      PRN:   metoclopramide 5 milliGRAM(s) Oral Before meals and at bedtime PRN  ondansetron    Tablet 8 milliGRAM(s) Oral three times a day PRN  traMADol 25 milliGRAM(s) Oral every 12 hours PRN      A/P:   59 year old F with a history of relapsed ALL   Status Post Allogeneic PBSCT Feb 2019 now presents with low BP and diarrhea.     1. Infectious Disease:   Fluconazole, Acyclovir     2. VOD Prophylaxis: Actigall, Glutamine, Heparin (dosed at 100 units / kg / day)     3. GI Prophylaxis:  Protonix    4. Mouthcare - NS / NaHCO3 rinses, Mycelex, Caphosol, skin care     5. GVHD prophylaxis     6. Transfuse & replete electrolytes prn     7. IV hydration, daily weights, strict I&O, prn diuresis     8. PO intake as tolerated, nutrition follow up as needed, MVI, folic acid     9. Antiemetics, anti-diarrhea medications:   Reglan, Ativan    10. OOB as tolerated, physical therapy consult if needed     11. Monitor coags / fibrinogen 2x week, vitamin K as needed     12. Monitor closely for clinical changes, monitor for fevers     13. Emotional support provided, plan of care discussed and questions addressed     14. Patient education done regarding chemotherapy prep, plan of care, restrictions and discharge planning     15. Continue regular social work input     I have written the above note for Dr. Watson who performed service with me in the room.   Nataliya Martinez NP-C ([phone])    I have seen and examined patient with NP, I agree with above note as scribed.

## 2019-03-31 LAB
ALBUMIN SERPL ELPH-MCNC: 2.5 G/DL — LOW (ref 3.3–5)
ALBUMIN SERPL ELPH-MCNC: 2.9 G/DL
ALP BLD-CCNC: 244 U/L
ALP SERPL-CCNC: 189 U/L — HIGH (ref 40–120)
ALT FLD-CCNC: 26 U/L — SIGNIFICANT CHANGE UP (ref 10–45)
ALT SERPL-CCNC: 31 U/L
ANION GAP SERPL CALC-SCNC: 12 MMOL/L — SIGNIFICANT CHANGE UP (ref 5–17)
ANION GAP SERPL CALC-SCNC: 15 MMOL/L
AST SERPL-CCNC: 45 U/L — HIGH (ref 10–40)
AST SERPL-CCNC: 52 U/L
BASOPHILS # BLD AUTO: 0.1 K/UL — SIGNIFICANT CHANGE UP (ref 0–0.2)
BASOPHILS NFR BLD AUTO: 0.8 % — SIGNIFICANT CHANGE UP (ref 0–2)
BILIRUB SERPL-MCNC: 0.2 MG/DL — SIGNIFICANT CHANGE UP (ref 0.2–1.2)
BILIRUB SERPL-MCNC: 0.3 MG/DL
BUN SERPL-MCNC: 36 MG/DL — HIGH (ref 7–23)
BUN SERPL-MCNC: 60 MG/DL
CALCIUM SERPL-MCNC: 8.4 MG/DL — SIGNIFICANT CHANGE UP (ref 8.4–10.5)
CALCIUM SERPL-MCNC: 9.3 MG/DL
CHLORIDE SERPL-SCNC: 102 MMOL/L
CHLORIDE SERPL-SCNC: 110 MMOL/L — HIGH (ref 96–108)
CMV DNA SPEC QL NAA+PROBE: 4889 IU/ML
CMVPCR LOG: 3.69 LOGIU/ML
CO2 SERPL-SCNC: 14 MMOL/L — LOW (ref 22–31)
CO2 SERPL-SCNC: 17 MMOL/L
CREAT SERPL-MCNC: 1.51 MG/DL — HIGH (ref 0.5–1.3)
CREAT SERPL-MCNC: 3.19 MG/DL
EOSINOPHIL # BLD AUTO: 0.1 K/UL — SIGNIFICANT CHANGE UP (ref 0–0.5)
EOSINOPHIL NFR BLD AUTO: 1.1 % — SIGNIFICANT CHANGE UP (ref 0–6)
GLUCOSE SERPL-MCNC: 125 MG/DL
GLUCOSE SERPL-MCNC: 164 MG/DL — HIGH (ref 70–99)
HCT VFR BLD CALC: 28.1 % — LOW (ref 34.5–45)
HGB BLD-MCNC: 9.4 G/DL — LOW (ref 11.5–15.5)
LDH SERPL L TO P-CCNC: 458 U/L — HIGH (ref 50–242)
LDH SERPL-CCNC: 637 U/L
LYMPHOCYTES # BLD AUTO: 4.1 K/UL — HIGH (ref 1–3.3)
LYMPHOCYTES # BLD AUTO: 44.8 % — HIGH (ref 13–44)
MAGNESIUM SERPL-MCNC: 2 MG/DL — SIGNIFICANT CHANGE UP (ref 1.6–2.6)
MAGNESIUM SERPL-MCNC: 2.7 MG/DL
MCHC RBC-ENTMCNC: 33.3 PG — SIGNIFICANT CHANGE UP (ref 27–34)
MCHC RBC-ENTMCNC: 33.5 GM/DL — SIGNIFICANT CHANGE UP (ref 32–36)
MCV RBC AUTO: 99.3 FL — SIGNIFICANT CHANGE UP (ref 80–100)
MONOCYTES # BLD AUTO: 0.5 K/UL — SIGNIFICANT CHANGE UP (ref 0–0.9)
MONOCYTES NFR BLD AUTO: 5.4 % — SIGNIFICANT CHANGE UP (ref 2–14)
NEUTROPHILS # BLD AUTO: 4.4 K/UL — SIGNIFICANT CHANGE UP (ref 1.8–7.4)
NEUTROPHILS NFR BLD AUTO: 48.1 % — SIGNIFICANT CHANGE UP (ref 43–77)
PHOSPHATE SERPL-MCNC: 2.6 MG/DL — SIGNIFICANT CHANGE UP (ref 2.5–4.5)
PLATELET # BLD AUTO: 38 K/UL — LOW (ref 150–400)
POTASSIUM SERPL-MCNC: 4.8 MMOL/L — SIGNIFICANT CHANGE UP (ref 3.5–5.3)
POTASSIUM SERPL-SCNC: 4.8 MMOL/L — SIGNIFICANT CHANGE UP (ref 3.5–5.3)
POTASSIUM SERPL-SCNC: 6 MMOL/L
PROT SERPL-MCNC: 4.4 G/DL — LOW (ref 6–8.3)
PROT SERPL-MCNC: 5.3 G/DL
RBC # BLD: 2.83 M/UL — LOW (ref 3.8–5.2)
RBC # FLD: 17.1 % — HIGH (ref 10.3–14.5)
SODIUM SERPL-SCNC: 134 MMOL/L
SODIUM SERPL-SCNC: 136 MMOL/L — SIGNIFICANT CHANGE UP (ref 135–145)
TACROLIMUS SERPL-MCNC: 2.3 NG/ML
WBC # BLD: 9.1 K/UL — SIGNIFICANT CHANGE UP (ref 3.8–10.5)
WBC # FLD AUTO: 9.1 K/UL — SIGNIFICANT CHANGE UP (ref 3.8–10.5)

## 2019-03-31 PROCEDURE — 99232 SBSQ HOSP IP/OBS MODERATE 35: CPT | Mod: GC

## 2019-03-31 RX ORDER — ONDANSETRON 8 MG/1
8 TABLET, FILM COATED ORAL EVERY 6 HOURS
Qty: 0 | Refills: 0 | Status: COMPLETED | OUTPATIENT
Start: 2019-03-31 | End: 2019-04-02

## 2019-03-31 RX ORDER — TACROLIMUS 5 MG/1
1 CAPSULE ORAL AT BEDTIME
Qty: 0 | Refills: 0 | Status: DISCONTINUED | OUTPATIENT
Start: 2019-03-31 | End: 2019-03-31

## 2019-03-31 RX ORDER — TACROLIMUS 5 MG/1
0.5 CAPSULE ORAL ONCE
Qty: 0 | Refills: 0 | Status: COMPLETED | OUTPATIENT
Start: 2019-03-31 | End: 2019-03-31

## 2019-03-31 RX ORDER — TACROLIMUS 5 MG/1
0.5 CAPSULE ORAL
Qty: 0 | Refills: 0 | Status: DISCONTINUED | OUTPATIENT
Start: 2019-03-31 | End: 2019-04-05

## 2019-03-31 RX ORDER — SALIVA SUBSTITUTE COMB NO.11 351 MG
5 POWDER IN PACKET (EA) MUCOUS MEMBRANE
Qty: 0 | Refills: 0 | Status: DISCONTINUED | OUTPATIENT
Start: 2019-03-31 | End: 2019-04-19

## 2019-03-31 RX ADMIN — LEVETIRACETAM 500 MILLIGRAM(S): 250 TABLET, FILM COATED ORAL at 17:57

## 2019-03-31 RX ADMIN — URSODIOL 300 MILLIGRAM(S): 250 TABLET, FILM COATED ORAL at 06:08

## 2019-03-31 RX ADMIN — ONDANSETRON 8 MILLIGRAM(S): 8 TABLET, FILM COATED ORAL at 09:56

## 2019-03-31 RX ADMIN — Medication 1 MILLIGRAM(S): at 11:43

## 2019-03-31 RX ADMIN — ONDANSETRON 8 MILLIGRAM(S): 8 TABLET, FILM COATED ORAL at 17:57

## 2019-03-31 RX ADMIN — PANTOPRAZOLE SODIUM 40 MILLIGRAM(S): 20 TABLET, DELAYED RELEASE ORAL at 06:08

## 2019-03-31 RX ADMIN — ATOVAQUONE 750 MILLIGRAM(S): 750 SUSPENSION ORAL at 17:57

## 2019-03-31 RX ADMIN — HYDROMORPHONE HYDROCHLORIDE 0.5 MILLIGRAM(S): 2 INJECTION INTRAMUSCULAR; INTRAVENOUS; SUBCUTANEOUS at 19:55

## 2019-03-31 RX ADMIN — HYDROMORPHONE HYDROCHLORIDE 0.5 MILLIGRAM(S): 2 INJECTION INTRAMUSCULAR; INTRAVENOUS; SUBCUTANEOUS at 06:47

## 2019-03-31 RX ADMIN — ONDANSETRON 8 MILLIGRAM(S): 8 TABLET, FILM COATED ORAL at 01:55

## 2019-03-31 RX ADMIN — MYCOPHENOLATE MOFETIL 1000 MILLIGRAM(S): 250 CAPSULE ORAL at 06:08

## 2019-03-31 RX ADMIN — HYDROMORPHONE HYDROCHLORIDE 0.5 MILLIGRAM(S): 2 INJECTION INTRAMUSCULAR; INTRAVENOUS; SUBCUTANEOUS at 13:24

## 2019-03-31 RX ADMIN — CEFEPIME 100 MILLIGRAM(S): 1 INJECTION, POWDER, FOR SOLUTION INTRAMUSCULAR; INTRAVENOUS at 11:42

## 2019-03-31 RX ADMIN — HYDROMORPHONE HYDROCHLORIDE 0.5 MILLIGRAM(S): 2 INJECTION INTRAMUSCULAR; INTRAVENOUS; SUBCUTANEOUS at 13:09

## 2019-03-31 RX ADMIN — Medication 1 TABLET(S): at 11:43

## 2019-03-31 RX ADMIN — TACROLIMUS 0.5 MILLIGRAM(S): 5 CAPSULE ORAL at 13:09

## 2019-03-31 RX ADMIN — LEVETIRACETAM 500 MILLIGRAM(S): 250 TABLET, FILM COATED ORAL at 06:08

## 2019-03-31 RX ADMIN — HYDROMORPHONE HYDROCHLORIDE 0.5 MILLIGRAM(S): 2 INJECTION INTRAMUSCULAR; INTRAVENOUS; SUBCUTANEOUS at 19:25

## 2019-03-31 RX ADMIN — TACROLIMUS 0.5 MILLIGRAM(S): 5 CAPSULE ORAL at 21:59

## 2019-03-31 RX ADMIN — URSODIOL 300 MILLIGRAM(S): 250 TABLET, FILM COATED ORAL at 17:57

## 2019-03-31 RX ADMIN — MYCOPHENOLATE MOFETIL 1000 MILLIGRAM(S): 250 CAPSULE ORAL at 17:57

## 2019-03-31 RX ADMIN — HYDROMORPHONE HYDROCHLORIDE 0.5 MILLIGRAM(S): 2 INJECTION INTRAMUSCULAR; INTRAVENOUS; SUBCUTANEOUS at 00:05

## 2019-03-31 RX ADMIN — FLUCONAZOLE 200 MILLIGRAM(S): 150 TABLET ORAL at 11:43

## 2019-03-31 RX ADMIN — ATOVAQUONE 750 MILLIGRAM(S): 750 SUSPENSION ORAL at 06:08

## 2019-03-31 RX ADMIN — VALGANCICLOVIR 450 MILLIGRAM(S): 450 TABLET, FILM COATED ORAL at 11:43

## 2019-03-31 RX ADMIN — HYDROMORPHONE HYDROCHLORIDE 0.5 MILLIGRAM(S): 2 INJECTION INTRAMUSCULAR; INTRAVENOUS; SUBCUTANEOUS at 06:17

## 2019-03-31 RX ADMIN — SODIUM CHLORIDE 100 MILLILITER(S): 9 INJECTION INTRAMUSCULAR; INTRAVENOUS; SUBCUTANEOUS at 06:08

## 2019-03-31 RX ADMIN — Medication 5 MILLILITER(S): at 20:27

## 2019-03-31 NOTE — PROGRESS NOTE ADULT - ATTENDING COMMENTS
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     Renally adjusted; Cefepime 2G daily  Vanco 1G once, continue pending blood cultures  Vanco trough in AM.  Tacro level from 3/30 low. Will give 0.5 in AM  Continue MMF 1G PO BID  Most recent FISH for Y on 3/22 100% donor  Continue Actigall for VOD prevention  Continue Keppra for h/o seizures  Contnue Fluconazole for antifungal ppx  Continue Mepron for PCP ppx  Continue supportive care.

## 2019-03-31 NOTE — PROGRESS NOTE ADULT - SUBJECTIVE AND OBJECTIVE BOX
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     Patient seen and examined with [facility] Transplant Team: Diarrhea improved.   Denies mouth / tongue / throat pain, dyspnea, cough, nausea, vomiting, diarrhea, abdominal pain         O: Vitals:   Vital Signs Last 24 Hrs  T(C): 36.7 (31 Mar 2019 05:04), Max: 37.4 (30 Mar 2019 13:02)  T(F): 98 (31 Mar 2019 05:04), Max: 99.3 (30 Mar 2019 13:02)  HR: 100 (31 Mar 2019 05:04) (87 - 116)  BP: 124/83 (31 Mar 2019 05:04) (102/69 - 124/83)  BP(mean): --  RR: 16 (31 Mar 2019 05:04) (16 - 18)  SpO2: 100% (31 Mar 2019 05:04) (98% - 100%)    Admit weight:   Daily     Daily Weight in k.6 (30 Mar 2019 09:34)    Intake / Output:    @ 07: @ 07:00  --------------------------------------------------------  IN: 1455 mL / OUT: 500 mL / NET: 955 mL     @ 07: @ 06:54  --------------------------------------------------------  IN: 2771 mL / OUT: 1000 mL / NET: 1771 mL          PE:   Oropharynx: Clear  Oral Mucositis:   clear                                                     Grade:   CVS: RRR  Lungs: CTA  Abdomen: + BS, SNT  Extremities: warm and dry no swelling  Gastric Mucositis:                                                  Grade:   Intestinal Mucositis:                                              Grade:   Skin: No rashes  TLC: PIV  Neuro: Awake alert and oriented  Pain: Denies      Labs:       Radiology:       Meds:   Antimicrobials:   atovaquone Suspension 750 milliGRAM(s) Oral two times a day  cefepime   IVPB 2000 milliGRAM(s) IV Intermittent daily  cefepime   IVPB      fluconAZOLE   Tablet 200 milliGRAM(s) Oral daily  valGANciclovir 450 milliGRAM(s) Oral daily      Heme / Onc:       GI:  metoclopramide 5 milliGRAM(s) Oral Before meals and at bedtime PRN  pantoprazole    Tablet 40 milliGRAM(s) Oral before breakfast  ursodiol Capsule 300 milliGRAM(s) Oral two times a day      Cardiovascular:       Immunologic:   mycophenolate mofetil 1000 milliGRAM(s) Oral two times a day      Other medications:   folic acid 1 milliGRAM(s) Oral daily  levETIRAcetam 500 milliGRAM(s) Oral two times a day  multivitamin 1 Tablet(s) Oral daily  sodium chloride 0.9%. 1000 milliLiter(s) IV Continuous <Continuous>      PRN:   HYDROmorphone  Injectable 0.5 milliGRAM(s) IV Push every 6 hours PRN  metoclopramide 5 milliGRAM(s) Oral Before meals and at bedtime PRN  ondansetron    Tablet 8 milliGRAM(s) Oral three times a day PRN  ondansetron Injectable 8 milliGRAM(s) IV Push every 6 hours PRN  traMADol 25 milliGRAM(s) Oral every 12 hours PRN    A/P:   59 year old F with a history of relapsed ALL   Status Post Allogeneic PBSCT 2019 now presents with low BP and diarrhea.     1. Infectious Disease:   Fluconazole, Acyclovir     2. VOD Prophylaxis: Actigall, Glutamine, Heparin (dosed at 100 units / kg / day)     3. GI Prophylaxis:  Protonix    4. Mouthcare - NS / NaHCO3 rinses, Mycelex, Caphosol, skin care     5. GVHD prophylaxis     6. Transfuse & replete electrolytes prn   7. IV hydration, daily weights, strict I&O, prn diuresis     8. PO intake as tolerated, nutrition follow up as needed, MVI, folic acid     9. Antiemetics, anti-diarrhea medications:   Reglan, Ativan    10. OOB as tolerated, physical therapy consult if needed     11. Monitor coags / fibrinogen 2x week, vitamin K as needed     12. Monitor closely for clinical changes, monitor for fevers     13. Emotional support provided, plan of care discussed and questions addressed     14. Patient education done regarding chemotherapy prep, plan of care, restrictions and discharge planning     15. Continue regular social work input     I have written the above note for Dr. Watson who performed service with me in the room.   Yolis Dunaway,  NP-C ([phone])          I have seen and examined patient with NP, I agree with above note as scribed. [facility] Transplant Team                                                      Critical / Counseling Time Provided: 30 minutes                                                                                                                                                          Chief Complaint: hypotension, elevated WBC    Patient seen and examined with [facility] Transplant Team:      c/o fatigue and decreased appetite; on and off dizziness, no vertigo; BM soft; [place]      Denies mouth / tongue / throat pain,  cough, nausea, vomiting, diarrhea, abdominal pain       O: Vitals:   Vital Signs Last 24 Hrs  T(C): 36.7 (31 Mar 2019 05:04), Max: 37.4 (30 Mar 2019 13:02)  T(F): 98 (31 Mar 2019 05:04), Max: 99.3 (30 Mar 2019 13:02)  HR: 100 (31 Mar 2019 05:04) (87 - 116)  BP: 124/83 (31 Mar 2019 05:04) (102/69 - 124/83)  BP(mean): --  RR: 16 (31 Mar 2019 05:04) (16 - 18)  SpO2: 100% (31 Mar 2019 05:04) (98% - 100%)    Admit weight: 104.3  Daily   107.6  Daily Weight in k.6 (30 Mar 2019 09:34)    Intake / Output:    @ 07: @ 07:00  --------------------------------------------------------  IN: 1455 mL / OUT: 500 mL / NET: 955 mL     @ 07: @ 06:54  --------------------------------------------------------  IN: 2771 mL / OUT: 1000 mL / NET: 1771 mL          PE:   Oropharynx: Clear  Oral Mucositis:   clear                                                     Grade:   CVS: RRR  Lungs: CTA  Abdomen: + BS, SNT  Extremities: warm and dry no swelling  Gastric Mucositis:                                                  Grade:   Intestinal Mucositis:                                              Grade:   Skin: No rash   PIV  Neuro: Awake alert and oriented  Pain: Denies      Labs:                         9.4    9.1   )-----------( 38       ( 31 Mar 2019 09:23 )             28.1         Mean Cell Volume : 99.3 fl  Mean Cell Hemoglobin : 33.3 pg  Mean Cell Hemoglobin Concentration : 33.5 gm/dL  Auto Neutrophil # : 4.4 K/uL  Auto Lymphocyte # : 4.1 K/uL  Auto Monocyte # : 0.5 K/uL  Auto Eosinophil # : 0.1 K/uL  Auto Basophil # : 0.1 K/uL  Auto Neutrophil % : 48.1 %  Auto Lymphocyte % : 44.8 %  Auto Monocyte % : 5.4 %  Auto Eosinophil % : 1.1 %  Auto Basophil % : 0.8 %          136  |  110<H>  |  36<H>  ----------------------------<  164<H>  4.8   |  14<L>  |  1.51<H>    Ca    8.4      31 Mar 2019 09:23  Phos  2.6       Mg     2.0         TPro  4.4<L>  /  Alb  2.5<L>  /  TBili  0.2  /  DBili  x   /  AST  45<H>  /  ALT  26  /  AlkPhos  189<H>        Uric Acid --    Tacrolimus (), Serum (19 @ 12:13)    Tacrolimus (), Serum: <2.0: Tacrolimus testing is performed on the [employer]  by  chemiluminescent microparticle immunoassay. The therapeutic range of  tacrolimus is not clearly defined but target 12-hour trough whole blood  concentrations are 5.0 - 20.0 ng/mL early post transplant. Twenty-four  hour  trough concentrations are 33-50% less than the corresponding 12-hour  trough. ng/mL      Culture - Blood (19 @ 21:18)    Specimen Source: .Blood Blood-Venous    Culture Results:   No growth to date.    Culture - Blood (19 @ 21:18)    Specimen Source: .Blood Blood-Peripheral    Culture Results:   No growth to date.      Radiology:     < from: Xray Chest 1 View AP/PA (19 @ 16:25) >  IMPRESSION: Clear lungs.      Meds:   Antimicrobials:   atovaquone Suspension 750 milliGRAM(s) Oral two times a day  cefepime   IVPB 2000 milliGRAM(s) IV Intermittent daily  Fluconazole  200 milliGRAM(s) Oral daily  valGANciclovir 450 milliGRAM(s) Oral daily      GI:  metoclopramide 5 milliGRAM(s) Oral Before meals and at bedtime PRN  pantoprazole    Tablet 40 milliGRAM(s) Oral before breakfast  ursodiol Capsule 300 milliGRAM(s) Oral two times a day    Immunologic:   mycophenolate mofetil 1000 milliGRAM(s) Oral two times a day  tacro 0.5 mg bid     Other medications:   folic acid 1 milliGRAM(s) Oral daily  levETIRAcetam 500 milliGRAM(s) Oral two times a day  multivitamin 1 Tablet(s) Oral daily  sodium chloride 0.9%. 1000 milliLiter(s) IV Continuous <Continuous>      PRN:   HYDROmorphone  Injectable 0.5 milliGRAM(s) IV Push every 6 hours PRN  metoclopramide 5 milliGRAM(s) Oral Before meals and at bedtime PRN  ondansetron    Tablet 8 milliGRAM(s) Oral three times a day PRN  ondansetron Injectable 8 milliGRAM(s) IV Push every 6 hours PRN  traMADol 25 milliGRAM(s) Oral every 12 hours PRN    A/P:   59 year old F with a history of relapsed ALL   Status Post Allogeneic PBSCT 2019 now presents with low BP and diarrhea.     1. Infectious Disease:   atovaquone Suspension 750 milliGRAM(s) Oral two times a day  cefepime   IVPB 2000 milliGRAM(s) IV Intermittent daily  Fluconazole  200 milliGRAM(s) Oral daily  valGANciclovir 450 milliGRAM(s) Oral daily  FU cx, last 3/29    2. VOD Prophylaxis: Actigall, Glutamine    3. GI Prophylaxis:  Protonix    4. Mouthcare - NS / NaHCO3 rinses, Mycelex, Biotene, skin care     5. GVHD prophylaxis     6. Transfuse & replete electrolytes prn     7. IV hydration, daily weights, strict I&O, prn diuresis   Weight gain, Cr improving, monitor closely     8. PO intake as tolerated, nutrition follow up as needed, MVI, folic acid   add Nepro, nutritionist to assess need   Consider nephro MVI    9. Antiemetics, anti-diarrhea medications:   Reglan, Ativan    10. OOB as tolerated, physical therapy consult if needed     11. Monitor coags / fibrinogen 2x week, vitamin K as needed     12. Monitor closely for clinical changes, monitor for fevers     13. Emotional support provided, plan of care discussed and questions addressed     14. Patient education done regarding chemotherapy prep, plan of care, restrictions and discharge planning     15. Continue regular social work input     16. check O2 sat on room air and post walk, O2 PRN    I have written the above note for Dr. Watson who performed service with me in the room.   Yolis Dunaway,  QUAN-C ([phone])          I have seen and examined patient with NP, I agree with above note as scribed.

## 2019-04-01 LAB
ALBUMIN SERPL ELPH-MCNC: 2.5 G/DL — LOW (ref 3.3–5)
ALP SERPL-CCNC: 191 U/L — HIGH (ref 40–120)
ALT FLD-CCNC: 28 U/L — SIGNIFICANT CHANGE UP (ref 10–45)
ANION GAP SERPL CALC-SCNC: 10 MMOL/L — SIGNIFICANT CHANGE UP (ref 5–17)
APTT BLD: 34.7 SEC — SIGNIFICANT CHANGE UP (ref 27.5–36.3)
AST SERPL-CCNC: 48 U/L — HIGH (ref 10–40)
BASOPHILS # BLD AUTO: 0.1 K/UL — SIGNIFICANT CHANGE UP (ref 0–0.2)
BASOPHILS NFR BLD AUTO: 1.2 % — SIGNIFICANT CHANGE UP (ref 0–2)
BILIRUB SERPL-MCNC: 0.2 MG/DL — SIGNIFICANT CHANGE UP (ref 0.2–1.2)
BLD GP AB SCN SERPL QL: NEGATIVE — SIGNIFICANT CHANGE UP
BUN SERPL-MCNC: 27 MG/DL — HIGH (ref 7–23)
CALCIUM SERPL-MCNC: 8.7 MG/DL — SIGNIFICANT CHANGE UP (ref 8.4–10.5)
CHLORIDE SERPL-SCNC: 113 MMOL/L — HIGH (ref 96–108)
CO2 SERPL-SCNC: 15 MMOL/L — LOW (ref 22–31)
CREAT SERPL-MCNC: 1.15 MG/DL — SIGNIFICANT CHANGE UP (ref 0.5–1.3)
EOSINOPHIL # BLD AUTO: 0.1 K/UL — SIGNIFICANT CHANGE UP (ref 0–0.5)
EOSINOPHIL NFR BLD AUTO: 1 % — SIGNIFICANT CHANGE UP (ref 0–6)
GLUCOSE SERPL-MCNC: 120 MG/DL — HIGH (ref 70–99)
HCT VFR BLD CALC: 27.7 % — LOW (ref 34.5–45)
HGB BLD-MCNC: 9.3 G/DL — LOW (ref 11.5–15.5)
INR BLD: 1.07 RATIO — SIGNIFICANT CHANGE UP (ref 0.88–1.16)
LDH SERPL L TO P-CCNC: 445 U/L — HIGH (ref 50–242)
LYMPHOCYTES # BLD AUTO: 3 K/UL — SIGNIFICANT CHANGE UP (ref 1–3.3)
LYMPHOCYTES # BLD AUTO: 35.8 % — SIGNIFICANT CHANGE UP (ref 13–44)
MAGNESIUM SERPL-MCNC: 1.9 MG/DL — SIGNIFICANT CHANGE UP (ref 1.6–2.6)
MCHC RBC-ENTMCNC: 33.4 PG — SIGNIFICANT CHANGE UP (ref 27–34)
MCHC RBC-ENTMCNC: 33.7 GM/DL — SIGNIFICANT CHANGE UP (ref 32–36)
MCV RBC AUTO: 99.1 FL — SIGNIFICANT CHANGE UP (ref 80–100)
MONOCYTES # BLD AUTO: 0.9 K/UL — SIGNIFICANT CHANGE UP (ref 0–0.9)
MONOCYTES NFR BLD AUTO: 10.6 % — SIGNIFICANT CHANGE UP (ref 2–14)
NEUTROPHILS # BLD AUTO: 4.3 K/UL — SIGNIFICANT CHANGE UP (ref 1.8–7.4)
NEUTROPHILS NFR BLD AUTO: 51.4 % — SIGNIFICANT CHANGE UP (ref 43–77)
PHOSPHATE SERPL-MCNC: 2.2 MG/DL — LOW (ref 2.5–4.5)
PLATELET # BLD AUTO: 36 K/UL — LOW (ref 150–400)
POTASSIUM SERPL-MCNC: 4.8 MMOL/L — SIGNIFICANT CHANGE UP (ref 3.5–5.3)
POTASSIUM SERPL-SCNC: 4.8 MMOL/L — SIGNIFICANT CHANGE UP (ref 3.5–5.3)
PROT SERPL-MCNC: 4.5 G/DL — LOW (ref 6–8.3)
PROTHROM AB SERPL-ACNC: 12.2 SEC — SIGNIFICANT CHANGE UP (ref 10–12.9)
RBC # BLD: 2.8 M/UL — LOW (ref 3.8–5.2)
RBC # FLD: 17 % — HIGH (ref 10.3–14.5)
RH IG SCN BLD-IMP: POSITIVE — SIGNIFICANT CHANGE UP
SODIUM SERPL-SCNC: 138 MMOL/L — SIGNIFICANT CHANGE UP (ref 135–145)
TACROLIMUS SERPL-MCNC: 5.2 NG/ML — SIGNIFICANT CHANGE UP
WBC # BLD: 8.4 K/UL — SIGNIFICANT CHANGE UP (ref 3.8–10.5)
WBC # FLD AUTO: 8.4 K/UL — SIGNIFICANT CHANGE UP (ref 3.8–10.5)

## 2019-04-01 PROCEDURE — 99291 CRITICAL CARE FIRST HOUR: CPT

## 2019-04-01 RX ORDER — HYDROMORPHONE HYDROCHLORIDE 2 MG/ML
1 INJECTION INTRAMUSCULAR; INTRAVENOUS; SUBCUTANEOUS EVERY 6 HOURS
Qty: 0 | Refills: 0 | Status: DISCONTINUED | OUTPATIENT
Start: 2019-04-01 | End: 2019-04-01

## 2019-04-01 RX ORDER — VANCOMYCIN HCL 1 G
125 VIAL (EA) INTRAVENOUS EVERY 6 HOURS
Qty: 0 | Refills: 0 | Status: DISCONTINUED | OUTPATIENT
Start: 2019-04-01 | End: 2019-04-19

## 2019-04-01 RX ORDER — MYCOPHENOLATE MOFETIL 250 MG/1
500 CAPSULE ORAL
Qty: 0 | Refills: 0 | Status: DISCONTINUED | OUTPATIENT
Start: 2019-04-01 | End: 2019-04-03

## 2019-04-01 RX ORDER — CITRIC ACID/SODIUM CITRATE 300-500 MG
30 SOLUTION, ORAL ORAL EVERY 8 HOURS
Qty: 0 | Refills: 0 | Status: DISCONTINUED | OUTPATIENT
Start: 2019-04-01 | End: 2019-04-02

## 2019-04-01 RX ORDER — HYDROMORPHONE HYDROCHLORIDE 2 MG/ML
1 INJECTION INTRAMUSCULAR; INTRAVENOUS; SUBCUTANEOUS EVERY 6 HOURS
Qty: 0 | Refills: 0 | Status: DISCONTINUED | OUTPATIENT
Start: 2019-04-01 | End: 2019-04-03

## 2019-04-01 RX ORDER — SODIUM CHLORIDE 9 MG/ML
1000 INJECTION, SOLUTION INTRAVENOUS
Qty: 0 | Refills: 0 | Status: DISCONTINUED | OUTPATIENT
Start: 2019-04-01 | End: 2019-04-02

## 2019-04-01 RX ORDER — TRAMADOL HYDROCHLORIDE 50 MG/1
25 TABLET ORAL EVERY 12 HOURS
Qty: 0 | Refills: 0 | Status: DISCONTINUED | OUTPATIENT
Start: 2019-04-01 | End: 2019-04-05

## 2019-04-01 RX ADMIN — ONDANSETRON 8 MILLIGRAM(S): 8 TABLET, FILM COATED ORAL at 19:21

## 2019-04-01 RX ADMIN — TRAMADOL HYDROCHLORIDE 25 MILLIGRAM(S): 50 TABLET ORAL at 12:20

## 2019-04-01 RX ADMIN — HYDROMORPHONE HYDROCHLORIDE 0.5 MILLIGRAM(S): 2 INJECTION INTRAMUSCULAR; INTRAVENOUS; SUBCUTANEOUS at 07:55

## 2019-04-01 RX ADMIN — PANTOPRAZOLE SODIUM 40 MILLIGRAM(S): 20 TABLET, DELAYED RELEASE ORAL at 06:48

## 2019-04-01 RX ADMIN — ATOVAQUONE 750 MILLIGRAM(S): 750 SUSPENSION ORAL at 05:42

## 2019-04-01 RX ADMIN — URSODIOL 300 MILLIGRAM(S): 250 TABLET, FILM COATED ORAL at 17:45

## 2019-04-01 RX ADMIN — TACROLIMUS 0.5 MILLIGRAM(S): 5 CAPSULE ORAL at 22:16

## 2019-04-01 RX ADMIN — TRAMADOL HYDROCHLORIDE 25 MILLIGRAM(S): 50 TABLET ORAL at 23:22

## 2019-04-01 RX ADMIN — ONDANSETRON 8 MILLIGRAM(S): 8 TABLET, FILM COATED ORAL at 00:26

## 2019-04-01 RX ADMIN — Medication 125 MILLIGRAM(S): at 18:44

## 2019-04-01 RX ADMIN — MYCOPHENOLATE MOFETIL 1000 MILLIGRAM(S): 250 CAPSULE ORAL at 05:42

## 2019-04-01 RX ADMIN — Medication 30 MILLILITER(S): at 13:43

## 2019-04-01 RX ADMIN — Medication 5 MILLILITER(S): at 00:26

## 2019-04-01 RX ADMIN — TACROLIMUS 0.5 MILLIGRAM(S): 5 CAPSULE ORAL at 09:15

## 2019-04-01 RX ADMIN — Medication 5 MILLILITER(S): at 07:40

## 2019-04-01 RX ADMIN — ONDANSETRON 8 MILLIGRAM(S): 8 TABLET, FILM COATED ORAL at 07:40

## 2019-04-01 RX ADMIN — Medication 1 TABLET(S): at 11:38

## 2019-04-01 RX ADMIN — LEVETIRACETAM 500 MILLIGRAM(S): 250 TABLET, FILM COATED ORAL at 05:42

## 2019-04-01 RX ADMIN — SODIUM CHLORIDE 100 MILLILITER(S): 9 INJECTION INTRAMUSCULAR; INTRAVENOUS; SUBCUTANEOUS at 05:42

## 2019-04-01 RX ADMIN — Medication 125 MILLIGRAM(S): at 13:42

## 2019-04-01 RX ADMIN — VALGANCICLOVIR 450 MILLIGRAM(S): 450 TABLET, FILM COATED ORAL at 11:38

## 2019-04-01 RX ADMIN — SODIUM CHLORIDE 75 MILLILITER(S): 9 INJECTION, SOLUTION INTRAVENOUS at 09:40

## 2019-04-01 RX ADMIN — TRAMADOL HYDROCHLORIDE 25 MILLIGRAM(S): 50 TABLET ORAL at 11:39

## 2019-04-01 RX ADMIN — MYCOPHENOLATE MOFETIL 500 MILLIGRAM(S): 250 CAPSULE ORAL at 17:45

## 2019-04-01 RX ADMIN — Medication 5 MILLILITER(S): at 17:45

## 2019-04-01 RX ADMIN — HYDROMORPHONE HYDROCHLORIDE 1 MILLIGRAM(S): 2 INJECTION INTRAMUSCULAR; INTRAVENOUS; SUBCUTANEOUS at 20:56

## 2019-04-01 RX ADMIN — HYDROMORPHONE HYDROCHLORIDE 1 MILLIGRAM(S): 2 INJECTION INTRAMUSCULAR; INTRAVENOUS; SUBCUTANEOUS at 14:00

## 2019-04-01 RX ADMIN — HYDROMORPHONE HYDROCHLORIDE 0.5 MILLIGRAM(S): 2 INJECTION INTRAMUSCULAR; INTRAVENOUS; SUBCUTANEOUS at 02:10

## 2019-04-01 RX ADMIN — LEVETIRACETAM 500 MILLIGRAM(S): 250 TABLET, FILM COATED ORAL at 17:44

## 2019-04-01 RX ADMIN — CEFEPIME 100 MILLIGRAM(S): 1 INJECTION, POWDER, FOR SOLUTION INTRAMUSCULAR; INTRAVENOUS at 11:39

## 2019-04-01 RX ADMIN — Medication 1 MILLIGRAM(S): at 11:38

## 2019-04-01 RX ADMIN — HYDROMORPHONE HYDROCHLORIDE 1 MILLIGRAM(S): 2 INJECTION INTRAMUSCULAR; INTRAVENOUS; SUBCUTANEOUS at 13:45

## 2019-04-01 RX ADMIN — HYDROMORPHONE HYDROCHLORIDE 0.5 MILLIGRAM(S): 2 INJECTION INTRAMUSCULAR; INTRAVENOUS; SUBCUTANEOUS at 01:48

## 2019-04-01 RX ADMIN — Medication 30 MILLILITER(S): at 22:15

## 2019-04-01 RX ADMIN — HYDROMORPHONE HYDROCHLORIDE 1 MILLIGRAM(S): 2 INJECTION INTRAMUSCULAR; INTRAVENOUS; SUBCUTANEOUS at 19:53

## 2019-04-01 RX ADMIN — Medication 62.5 MILLIMOLE(S): at 18:08

## 2019-04-01 RX ADMIN — FLUCONAZOLE 200 MILLIGRAM(S): 150 TABLET ORAL at 11:38

## 2019-04-01 RX ADMIN — Medication 5 MILLILITER(S): at 22:24

## 2019-04-01 RX ADMIN — Medication 125 MILLIGRAM(S): at 23:22

## 2019-04-01 RX ADMIN — ATOVAQUONE 750 MILLIGRAM(S): 750 SUSPENSION ORAL at 17:44

## 2019-04-01 RX ADMIN — HYDROMORPHONE HYDROCHLORIDE 0.5 MILLIGRAM(S): 2 INJECTION INTRAMUSCULAR; INTRAVENOUS; SUBCUTANEOUS at 07:39

## 2019-04-01 RX ADMIN — URSODIOL 300 MILLIGRAM(S): 250 TABLET, FILM COATED ORAL at 05:42

## 2019-04-01 RX ADMIN — Medication 5 MILLILITER(S): at 23:22

## 2019-04-01 NOTE — PROGRESS NOTE ADULT - ATTENDING COMMENTS
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     Renally adjusted; Cefepime 2G daily  Vanco 1G once, continue pending blood cultures  Vanco trough in AM.  Tacro level from 3/30 low. Will give 0.5 in AM  Continue MMF 1G PO BID  Most recent FISH for Y on 3/22 100% donor  Continue Actigall for VOD prevention  Continue Keppra for h/o seizures  Contnue Fluconazole for antifungal ppx  Continue Mepron for PCP ppx  Continue supportive care. 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     Renally adjusted; Cefepime 2G daily  Vanco 1G once, continue pending blood cultures  resume po vanco for cdiff....would rather taper slowly   Tacro level from 3/30 low. on  0.5 in bid...dose decreased b/c of increased creatinine  Continue MMF 1G PO BID..taper held b/c upper GI sx and rash...  Most recent FISH for Y on 3/22 100% donor  Continue Actigall for VOD prevention  Continue Keppra for h/o seizures  Contnue Fluconazole for antifungal ppx  Continue Mepron for PCP ppx  Continue supportive care. 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  cmv pos   Renally adjusted; Cefepime 2G daily  Vanco 1G once, continue pending blood cultures  resume po vanco for cdiff....would rather taper slowly   Tacro level from 3/30 low. on  0.5 in bid..level pending....dose decreased b/c of increased creatinine  Continue MMF 1G PO BID..taper held b/c upper GI sx and rash...may be reason counts are dropping along with the valcyte  Most recent FISH for Y on 3/22 100% donor...repeat cmv pcr  manage bicarb  Continue Actigall for VOD prevention  Continue Keppra for h/o seizures  Contnue Fluconazole for antifungal ppx  Continue Mepron for PCP ppx  Continue supportive care.

## 2019-04-01 NOTE — PROGRESS NOTE ADULT - SUBJECTIVE AND OBJECTIVE BOX
[facility] Transplant Team                                                      Critical / Counseling Time Provided: 30 minutes                                                                                                                                                                                                                                                                                                       Chief Complaint: hypotension, elevated WBC    Patient seen and examined with [facility] Transplant Team:      c/o fatigue and decreased appetite; on and off dizziness, no vertigo; BM soft; [place]      Denies mouth / tongue / throat pain,  cough, nausea, vomiting, diarrhea, abdominal pain       O: Vitals:   Vital Signs Last 24 Hrs  T(C): 36.8 (2019 05:42), Max: 37.2 (31 Mar 2019 21:32)  T(F): 98.2 (2019 05:42), Max: 98.9 (31 Mar 2019 21:32)  HR: 82 (2019 05:42) (80 - 108)  BP: 101/67 (2019 05:42) (101/67 - 132/71)  RR: 16 (2019 05:42) (16 - 18)  SpO2: 99% (2019 05:42) (98% - 100%)    Admit weight: 104.3 kg  Daily     Daily Weight in k.6 (31 Mar 2019 10:57)    Intake / Output:    @ 07:01  -  - @ 07:00  --------------------------------------------------------  IN: 3021 mL / OUT: 850 mL / NET: 2171 mL      PE:   Oropharynx: Clear  Oral Mucositis:   clear                                                     Grade:   CVS: RRR  Lungs: CTA  Abdomen: + BS, SNT  Extremities: warm and dry no swelling  Gastric Mucositis:                                                  Grade:   Intestinal Mucositis:                                              Grade:   Skin: No rash   PIV  Neuro: Awake alert and oriented  Pain: Denies      Labs:                Cultures:     Culture - Blood (19 @ 21:18)    Specimen Source: .Blood Blood-Venous    Culture Results:   No growth to date.      Culture - Blood (19 @ 21:18)    Specimen Source: .Blood Blood-Peripheral    Culture Results:   No growth to date.      Radiology:     from: Xray Chest 1 View AP/PA (19 @ 16:25)     IMPRESSION: Clear lungs.      Meds:   Antimicrobials:   atovaquone Suspension 750 milliGRAM(s) Oral two times a day  cefepime   IVPB 2000 milliGRAM(s) IV Intermittent daily    fluconAZOLE   Tablet 200 milliGRAM(s) Oral daily  valGANciclovir 450 milliGRAM(s) Oral daily        GI:  metoclopramide 5 milliGRAM(s) Oral Before meals and at bedtime PRN  pantoprazole    Tablet 40 milliGRAM(s) Oral before breakfast  ursodiol Capsule 300 milliGRAM(s) Oral two times a day      Immunologic:   mycophenolate mofetil 1000 milliGRAM(s) Oral two times a day  tacrolimus 0.5 milliGRAM(s) Oral <User Schedule>      Other medications:   Biotene Dry Mouth Oral Rinse 5 milliLiter(s) Swish and Spit five times a day  folic acid 1 milliGRAM(s) Oral daily  levETIRAcetam 500 milliGRAM(s) Oral two times a day  multivitamin 1 Tablet(s) Oral daily  sodium chloride 0.9%. 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     1. Infectious Disease:   atovaquone Suspension 750 milliGRAM(s) Oral two times a day  cefepime   IVPB 2000 milliGRAM(s) IV Intermittent daily  Fluconazole  200 milliGRAM(s) Oral daily  valGANciclovir 450 milliGRAM(s) Oral daily  FU cx, last 3/29    2. VOD Prophylaxis: Actigall, Glutamine    3. GI Prophylaxis:  Protonix    4. Mouthcare - NS / NaHCO3 rinses, Mycelex, Biotene, skin care     5. GVHD prophylaxis     6. Transfuse & replete electrolytes prn     7. IV hydration, daily weights, strict I&O, prn diuresis   Weight gain, Cr improving, monitor closely     8. PO intake as tolerated, nutrition follow up as needed, MVI, folic acid   add Nepro, nutritionist to assess need   Consider nephro MVI    9. Antiemetics, anti-diarrhea medications:   Reglan, Ativan    10. OOB as tolerated, physical therapy consult if needed     11. Monitor coags / fibrinogen 2x week, vitamin K as needed     12. Monitor closely for clinical changes, monitor for fevers     13. Emotional support provided, plan of care discussed and questions addressed     14. Patient education done regarding chemotherapy prep, plan of care, restrictions and discharge planning     15. Continue regular social work input     16. check O2 sat on room air and post walk, O2 PRN    I have written the above note for Dr. Bentley who performed service with me in the room.   Janett Quezada NP-C ([phone])    I have seen and examined patient with NP, I agree with above note as scribed.

## 2019-04-02 LAB
ALBUMIN SERPL ELPH-MCNC: 2.6 G/DL — LOW (ref 3.3–5)
ALP SERPL-CCNC: 195 U/L — HIGH (ref 40–120)
ALT FLD-CCNC: 28 U/L — SIGNIFICANT CHANGE UP (ref 10–45)
ANION GAP SERPL CALC-SCNC: 10 MMOL/L — SIGNIFICANT CHANGE UP (ref 5–17)
AST SERPL-CCNC: 46 U/L — HIGH (ref 10–40)
BASOPHILS # BLD AUTO: 0 K/UL — SIGNIFICANT CHANGE UP (ref 0–0.2)
BASOPHILS NFR BLD AUTO: 0.5 % — SIGNIFICANT CHANGE UP (ref 0–2)
BILIRUB SERPL-MCNC: 0.2 MG/DL — SIGNIFICANT CHANGE UP (ref 0.2–1.2)
BUN SERPL-MCNC: 20 MG/DL — SIGNIFICANT CHANGE UP (ref 7–23)
CALCIUM SERPL-MCNC: 8.6 MG/DL — SIGNIFICANT CHANGE UP (ref 8.4–10.5)
CHLORIDE SERPL-SCNC: 109 MMOL/L — HIGH (ref 96–108)
CHROM ANALY INTERPHASE BLD FISH-IMP: SIGNIFICANT CHANGE UP
CMV DNA CSF QL NAA+PROBE: SIGNIFICANT CHANGE UP
CMV DNA CSF QL NAA+PROBE: SIGNIFICANT CHANGE UP
CMV DNA SPEC NAA+PROBE-LOG#: 4.11 LOGIU/ML — HIGH
CMV DNA SPEC NAA+PROBE-LOG#: 4.27 LOGIU/ML — HIGH
CO2 SERPL-SCNC: 20 MMOL/L — LOW (ref 22–31)
CREAT SERPL-MCNC: 1.09 MG/DL — SIGNIFICANT CHANGE UP (ref 0.5–1.3)
EOSINOPHIL # BLD AUTO: 0.1 K/UL — SIGNIFICANT CHANGE UP (ref 0–0.5)
EOSINOPHIL NFR BLD AUTO: 1.4 % — SIGNIFICANT CHANGE UP (ref 0–6)
GLUCOSE SERPL-MCNC: 125 MG/DL — HIGH (ref 70–99)
HCT VFR BLD CALC: 28.1 % — LOW (ref 34.5–45)
HGB BLD-MCNC: 9.4 G/DL — LOW (ref 11.5–15.5)
LDH SERPL L TO P-CCNC: 421 U/L — HIGH (ref 50–242)
LYMPHOCYTES # BLD AUTO: 2.5 K/UL — SIGNIFICANT CHANGE UP (ref 1–3.3)
LYMPHOCYTES # BLD AUTO: 34 % — SIGNIFICANT CHANGE UP (ref 13–44)
MAGNESIUM SERPL-MCNC: 1.7 MG/DL — SIGNIFICANT CHANGE UP (ref 1.6–2.6)
MCHC RBC-ENTMCNC: 33.2 PG — SIGNIFICANT CHANGE UP (ref 27–34)
MCHC RBC-ENTMCNC: 33.6 GM/DL — SIGNIFICANT CHANGE UP (ref 32–36)
MCV RBC AUTO: 98.8 FL — SIGNIFICANT CHANGE UP (ref 80–100)
MONOCYTES # BLD AUTO: 0.6 K/UL — SIGNIFICANT CHANGE UP (ref 0–0.9)
MONOCYTES NFR BLD AUTO: 8.7 % — SIGNIFICANT CHANGE UP (ref 2–14)
NEUTROPHILS # BLD AUTO: 4 K/UL — SIGNIFICANT CHANGE UP (ref 1.8–7.4)
NEUTROPHILS NFR BLD AUTO: 55.4 % — SIGNIFICANT CHANGE UP (ref 43–77)
PHOSPHATE SERPL-MCNC: 2.3 MG/DL — LOW (ref 2.5–4.5)
PLATELET # BLD AUTO: 30 K/UL — LOW (ref 150–400)
POTASSIUM SERPL-MCNC: 4 MMOL/L — SIGNIFICANT CHANGE UP (ref 3.5–5.3)
POTASSIUM SERPL-SCNC: 4 MMOL/L — SIGNIFICANT CHANGE UP (ref 3.5–5.3)
PROT SERPL-MCNC: 4.5 G/DL — LOW (ref 6–8.3)
RBC # BLD: 2.85 M/UL — LOW (ref 3.8–5.2)
RBC # FLD: 17 % — HIGH (ref 10.3–14.5)
SODIUM SERPL-SCNC: 139 MMOL/L — SIGNIFICANT CHANGE UP (ref 135–145)
WBC # BLD: 7.3 K/UL — SIGNIFICANT CHANGE UP (ref 3.8–10.5)
WBC # FLD AUTO: 7.3 K/UL — SIGNIFICANT CHANGE UP (ref 3.8–10.5)

## 2019-04-02 PROCEDURE — 99291 CRITICAL CARE FIRST HOUR: CPT

## 2019-04-02 PROCEDURE — 74176 CT ABD & PELVIS W/O CONTRAST: CPT | Mod: 26

## 2019-04-02 RX ORDER — POTASSIUM PHOSPHATE, MONOBASIC POTASSIUM PHOSPHATE, DIBASIC 236; 224 MG/ML; MG/ML
15 INJECTION, SOLUTION INTRAVENOUS ONCE
Qty: 0 | Refills: 0 | Status: COMPLETED | OUTPATIENT
Start: 2019-04-02 | End: 2019-04-02

## 2019-04-02 RX ORDER — CITRIC ACID/SODIUM CITRATE 300-500 MG
30 SOLUTION, ORAL ORAL EVERY 12 HOURS
Qty: 0 | Refills: 0 | Status: DISCONTINUED | OUTPATIENT
Start: 2019-04-02 | End: 2019-04-04

## 2019-04-02 RX ADMIN — HYDROMORPHONE HYDROCHLORIDE 1 MILLIGRAM(S): 2 INJECTION INTRAMUSCULAR; INTRAVENOUS; SUBCUTANEOUS at 03:30

## 2019-04-02 RX ADMIN — HYDROMORPHONE HYDROCHLORIDE 1 MILLIGRAM(S): 2 INJECTION INTRAMUSCULAR; INTRAVENOUS; SUBCUTANEOUS at 08:38

## 2019-04-02 RX ADMIN — FLUCONAZOLE 200 MILLIGRAM(S): 150 TABLET ORAL at 11:23

## 2019-04-02 RX ADMIN — ATOVAQUONE 750 MILLIGRAM(S): 750 SUSPENSION ORAL at 17:45

## 2019-04-02 RX ADMIN — TRAMADOL HYDROCHLORIDE 25 MILLIGRAM(S): 50 TABLET ORAL at 00:55

## 2019-04-02 RX ADMIN — SODIUM CHLORIDE 20 MILLILITER(S): 9 INJECTION, SOLUTION INTRAVENOUS at 11:23

## 2019-04-02 RX ADMIN — HYDROMORPHONE HYDROCHLORIDE 1 MILLIGRAM(S): 2 INJECTION INTRAMUSCULAR; INTRAVENOUS; SUBCUTANEOUS at 09:55

## 2019-04-02 RX ADMIN — MYCOPHENOLATE MOFETIL 500 MILLIGRAM(S): 250 CAPSULE ORAL at 06:17

## 2019-04-02 RX ADMIN — ATOVAQUONE 750 MILLIGRAM(S): 750 SUSPENSION ORAL at 06:16

## 2019-04-02 RX ADMIN — HYDROMORPHONE HYDROCHLORIDE 1 MILLIGRAM(S): 2 INJECTION INTRAMUSCULAR; INTRAVENOUS; SUBCUTANEOUS at 02:31

## 2019-04-02 RX ADMIN — Medication 30 MILLILITER(S): at 13:13

## 2019-04-02 RX ADMIN — SODIUM CHLORIDE 75 MILLILITER(S): 9 INJECTION, SOLUTION INTRAVENOUS at 06:17

## 2019-04-02 RX ADMIN — ONDANSETRON 8 MILLIGRAM(S): 8 TABLET, FILM COATED ORAL at 06:25

## 2019-04-02 RX ADMIN — HYDROMORPHONE HYDROCHLORIDE 1 MILLIGRAM(S): 2 INJECTION INTRAMUSCULAR; INTRAVENOUS; SUBCUTANEOUS at 21:16

## 2019-04-02 RX ADMIN — Medication 1 TABLET(S): at 11:22

## 2019-04-02 RX ADMIN — Medication 1 MILLIGRAM(S): at 11:22

## 2019-04-02 RX ADMIN — HYDROMORPHONE HYDROCHLORIDE 1 MILLIGRAM(S): 2 INJECTION INTRAMUSCULAR; INTRAVENOUS; SUBCUTANEOUS at 14:35

## 2019-04-02 RX ADMIN — TACROLIMUS 0.5 MILLIGRAM(S): 5 CAPSULE ORAL at 22:22

## 2019-04-02 RX ADMIN — LEVETIRACETAM 500 MILLIGRAM(S): 250 TABLET, FILM COATED ORAL at 06:17

## 2019-04-02 RX ADMIN — POTASSIUM PHOSPHATE, MONOBASIC POTASSIUM PHOSPHATE, DIBASIC 62.5 MILLIMOLE(S): 236; 224 INJECTION, SOLUTION INTRAVENOUS at 11:23

## 2019-04-02 RX ADMIN — Medication 5 MILLILITER(S): at 11:21

## 2019-04-02 RX ADMIN — HYDROMORPHONE HYDROCHLORIDE 1 MILLIGRAM(S): 2 INJECTION INTRAMUSCULAR; INTRAVENOUS; SUBCUTANEOUS at 20:24

## 2019-04-02 RX ADMIN — VALGANCICLOVIR 450 MILLIGRAM(S): 450 TABLET, FILM COATED ORAL at 11:23

## 2019-04-02 RX ADMIN — MYCOPHENOLATE MOFETIL 500 MILLIGRAM(S): 250 CAPSULE ORAL at 17:46

## 2019-04-02 RX ADMIN — TRAMADOL HYDROCHLORIDE 25 MILLIGRAM(S): 50 TABLET ORAL at 23:38

## 2019-04-02 RX ADMIN — Medication 30 MILLILITER(S): at 06:17

## 2019-04-02 RX ADMIN — Medication 5 MILLILITER(S): at 09:15

## 2019-04-02 RX ADMIN — Medication 5 MILLILITER(S): at 20:24

## 2019-04-02 RX ADMIN — LEVETIRACETAM 500 MILLIGRAM(S): 250 TABLET, FILM COATED ORAL at 17:45

## 2019-04-02 RX ADMIN — Medication 5 MILLILITER(S): at 23:38

## 2019-04-02 RX ADMIN — Medication 125 MILLIGRAM(S): at 17:46

## 2019-04-02 RX ADMIN — TRAMADOL HYDROCHLORIDE 25 MILLIGRAM(S): 50 TABLET ORAL at 11:33

## 2019-04-02 RX ADMIN — CEFEPIME 100 MILLIGRAM(S): 1 INJECTION, POWDER, FOR SOLUTION INTRAMUSCULAR; INTRAVENOUS at 11:22

## 2019-04-02 RX ADMIN — Medication 125 MILLIGRAM(S): at 23:38

## 2019-04-02 RX ADMIN — Medication 125 MILLIGRAM(S): at 06:17

## 2019-04-02 RX ADMIN — TRAMADOL HYDROCHLORIDE 25 MILLIGRAM(S): 50 TABLET ORAL at 12:15

## 2019-04-02 RX ADMIN — URSODIOL 300 MILLIGRAM(S): 250 TABLET, FILM COATED ORAL at 17:46

## 2019-04-02 RX ADMIN — Medication 125 MILLIGRAM(S): at 11:23

## 2019-04-02 RX ADMIN — URSODIOL 300 MILLIGRAM(S): 250 TABLET, FILM COATED ORAL at 06:17

## 2019-04-02 RX ADMIN — HYDROMORPHONE HYDROCHLORIDE 1 MILLIGRAM(S): 2 INJECTION INTRAMUSCULAR; INTRAVENOUS; SUBCUTANEOUS at 14:17

## 2019-04-02 RX ADMIN — TACROLIMUS 0.5 MILLIGRAM(S): 5 CAPSULE ORAL at 09:15

## 2019-04-02 RX ADMIN — PANTOPRAZOLE SODIUM 40 MILLIGRAM(S): 20 TABLET, DELAYED RELEASE ORAL at 06:17

## 2019-04-02 NOTE — DIETITIAN INITIAL EVALUATION ADULT. - ORAL INTAKE PTA
pt reports PO intake the last few weeks has been suboptimal, reports when she first went home after allogeneic PBSCT, she was eating fair amounts, reports her PO intake did gradually decline- reports she was eating "light" and "soft"  foods such as [employer] Instant Breakfast shakes, yogurts, eggs, and some baby foods such as pureed bananas

## 2019-04-02 NOTE — DIETITIAN INITIAL EVALUATION ADULT. - PHYSICAL APPEARANCE
pt agreeable to nutrition focused physical exam, no areas of muscle or fat loss noted at this time/well nourished

## 2019-04-02 NOTE — DIETITIAN INITIAL EVALUATION ADULT. - PROBLEM SELECTOR PLAN 4
CDiff precautions until observation period completed  No indication to repeat test  Discontinue Vanco PO, course complete

## 2019-04-02 NOTE — DIETITIAN INITIAL EVALUATION ADULT. - OTHER INFO
Pt seen for consult for "assessment." Pt reports she weighed in the 240s prior to transplant and thought her wt was around 235 pounds now, noted dosing wt of about 230 pounds, noted wt has been gradually increasing and wt now of 240.9 pounds, pt only c +1 hebert. ankle edema. Noted wt of about 247 pounds during last RD follow up during last admission. Pt reports allergy to shellfish (reaction is nausea, stomach upset and mouth tingling). Pt reports she was taking folic acid and multivitamin at home. Pt reports she has had some nausea but it has been controlled since admission due to anti-nausea medications.

## 2019-04-02 NOTE — PROGRESS NOTE ADULT - ATTENDING COMMENTS
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  cmv pos   Renally adjusted; Cefepime 2G daily  Vanco 1G once, continue pending blood cultures  resume po vanco for cdiff....would rather taper slowly   Tacro level from 3/30 low. on  0.5 in bid..level pending....dose decreased b/c of increased creatinine  Continue MMF 1G PO BID..taper held b/c upper GI sx and rash...may be reason counts are dropping along with the valcyte  Most recent FISH for Y on 3/22 100% donor...repeat cmv pcr  manage bicarb  Continue Actigall for VOD prevention  Continue Keppra for h/o seizures  Contnue Fluconazole for antifungal ppx  Continue Mepron for PCP ppx  Continue supportive care.

## 2019-04-02 NOTE — DIETITIAN INITIAL EVALUATION ADULT. - NS AS NUTRI INTERV ED CONTENT
Encouraged PO intake-small, frequent meals and nutrient dense snacks. In house, encouraged pt to order nutrient dense snacks c meals to consume in between meals to mimic small, frequent meals. Discussed protein rich foods available on menu. Discussed consuming protein first at meal times and then eating the other foods. Will provide trial of high protein gelato and [employer] Instant Breakfast x 1 daily.

## 2019-04-02 NOTE — DIETITIAN INITIAL EVALUATION ADULT. - ENERGY NEEDS
ht:5'3", wt:240.9 pounds, BMI:42.7 kg/m2, IBW: 115 pounds +/- 10%     Pt admitted c hypotension and increased WBC, abdominal pain- awaiting CT scan, had diarrhea-questionable if related to infectious versus GVHD; pt c h/o ALL and allogeneic PBSCT in February 2019.

## 2019-04-02 NOTE — DIETITIAN INITIAL EVALUATION ADULT. - PROBLEM SELECTOR PLAN 1
Cefepime 2G daily  Vanco 1G once, continue pending blood cultures  Vanco trough in AM in event of need to continue daily dose by level  F/U CXR, prelim clear  Start Valcyte 450 mg PO daily, F/U CMV pcr from 3/30, discontinue Acyclovir  Continue renally dosed Fluconazole, full dose Mepron  F/U RVP  F/U blood cultures, urine culture  IVF  Hold antihypertensives in setting of hypotension

## 2019-04-02 NOTE — PROGRESS NOTE ADULT - SUBJECTIVE AND OBJECTIVE BOX
[facility] Transplant Team                                                      Critical / Counseling Time Provided: 30 minutes                                                                                                                                                                                                                                                                                                  Chief Complaint: hypotension, elevated WBC    Patient seen and examined with \[facility]\"" Transplant Team:      c/o fatigue and decreased appetite; on and off dizziness, no vertigo; BM soft; [place]      Denies mouth / tongue / throat pain,  cough, nausea, vomiting, diarrhea, abdominal pain     Vital Signs Last 24 Hrs  T(C): 36.5 (02 Apr 2019 05:37), Max: 37.1 (01 Apr 2019 17:21)  T(F): 97.7 (02 Apr 2019 05:37), Max: 98.7 (01 Apr 2019 17:21)  HR: 80 (02 Apr 2019 05:37) (80 - 107)  BP: 104/67 (02 Apr 2019 05:37) (101/64 - 128/81)  BP(mean): --  RR: 16 (02 Apr 2019 05:37) (16 - 18)  SpO2: 99% (02 Apr 2019 05:37) (97% - 100%)    Admit weight: 104.3 kg  Daily Weight in kG:     I&O's Summary    01 Apr 2019 07:01  -  02 Apr 2019 07:00  --------------------------------------------------------  IN: 1533 mL / OUT: 1100 mL / NET: 433 mL    PE:   Oropharynx: Clear  Oral Mucositis:   clear                                                     Grade:   CVS: RRR  Lungs: CTA  Abdomen: + BS, SNT  Extremities: warm and dry no swelling  Gastric Mucositis:                                                  Grade:   Intestinal Mucositis:                                              Grade:   Skin: No rash   PIV  Neuro: Awake alert and oriented  Pain: Denies    Labs:                         9.3    8.4   )-----------( 36       ( 01 Apr 2019 13:06 )             27.7     04-01    138  |  113<H>  |  27<H>  ----------------------------<  120<H>  4.8   |  15<L>  |  1.15    Ca    8.7      01 Apr 2019 13:06  Phos  2.2     04-01  Mg     1.9     04-01    TPro  4.5<L>  /  Alb  2.5<L>  /  TBili  0.2  /  DBili  x   /  AST  48<H>  /  ALT  28  /  AlkPhos  191<H>  04-01    CAPILLARY BLOOD GLUCOSE    PT/INR - ( 01 Apr 2019 13:06 )   PT: 12.2 sec;   INR: 1.07 ratio      PTT - ( 01 Apr 2019 17:42 )  PTT:34.7 sec    Cultures:     Culture - Blood (03.29.19 @ 21:18)    Specimen Source: .Blood Blood-Venous    Culture Results:   No growth to date.    Culture - Blood (03.29.19 @ 21:18)    Specimen Source: .Blood Blood-Peripheral    Culture Results:   No growth to date.    Radiology:     from: Xray Chest 1 View AP/PA (03.29.19 @ 16:25)     IMPRESSION: Clear lungs.    MEDICATIONS  (STANDING):  atovaquone Suspension 750 milliGRAM(s) Oral two times a day  Biotene Dry Mouth Oral Rinse 5 milliLiter(s) Swish and Spit five times a day  cefepime   IVPB 2000 milliGRAM(s) IV Intermittent daily  cefepime   IVPB      citric acid/sodium citrate Solution 30 milliLiter(s) Oral every 8 hours  dextrose 5% 1000 milliLiter(s) (75 mL/Hr) IV Continuous <Continuous>  fluconAZOLE   Tablet 200 milliGRAM(s) Oral daily  folic acid 1 milliGRAM(s) Oral daily  levETIRAcetam 500 milliGRAM(s) Oral two times a day  multivitamin 1 Tablet(s) Oral daily  mycophenolate mofetil 500 milliGRAM(s) Oral two times a day  pantoprazole    Tablet 40 milliGRAM(s) Oral before breakfast  tacrolimus 0.5 milliGRAM(s) Oral <User Schedule>  ursodiol Capsule 300 milliGRAM(s) Oral two times a day  valGANciclovir 450 milliGRAM(s) Oral daily  vancomycin    Solution 125 milliGRAM(s) Oral every 6 hours    MEDICATIONS  (PRN):  HYDROmorphone  Injectable 1 milliGRAM(s) IV Push every 6 hours PRN Severe Pain (7 - 10)  metoclopramide 5 milliGRAM(s) Oral Before meals and at bedtime PRN nausea  ondansetron    Tablet 8 milliGRAM(s) Oral three times a day PRN Nausea and/or Vomiting  ondansetron Injectable 8 milliGRAM(s) IV Push every 6 hours PRN Nausea and/or Vomiting  traMADol 25 milliGRAM(s) Oral every 12 hours PRN Mild Pain (1 - 3)    A/P:   59 year old F with a history of relapsed ALL  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  Cr 1.15 from 4/1    1. Infectious Disease:   atovaquone Suspension 750 milliGRAM(s) Oral two times a day  cefepime   IVPB 2000 milliGRAM(s) IV Intermittent daily  Fluconazole  200 milliGRAM(s) Oral daily  valGANciclovir 450 milliGRAM(s) Oral daily    2. VOD Prophylaxis: Actigall, Glutamine    3. GI Prophylaxis:  Protonix    4. Mouthcare - NS / NaHCO3 rinses, Mycelex, Biotene, skin care     5. GVHD prophylaxis     6. Transfuse & replete electrolytes prn     7. IV hydration, daily weights, strict I&O, prn diuresis   Weight gain, Cr improving, monitor closely     8. PO intake as tolerated, nutrition follow up as needed, MVI, folic acid   add Nepro, nutritionist to assess need   Consider nephro MVI    9. Antiemetics, anti-diarrhea medications:   Reglan, Ativan    10. OOB as tolerated, physical therapy consult if needed     11. Monitor coags / fibrinogen 2x week, vitamin K as needed     12. Monitor closely for clinical changes, monitor for fevers     13. Emotional support provided, plan of care discussed and questions addressed     14. Patient education done regarding chemotherapy prep, plan of care, restrictions and discharge planning     15. Continue regular social work input     16. check O2 sat on room air and post walk, O2 PRN    I have written the above note for Dr. Bentley who performed service with me in the room.   Antonia Camarillo, ANP-BC ([phone])    I have seen and examined patient with NP, I agree with above note as scribed. [facility] Transplant Team                                                      Critical / Counseling Time Provided: 30 minutes                                                                                                                                                                                                                                                                                                  Chief Complaint: hypotension, elevated WBC    Patient seen and examined with [facility] Transplant Team:     + fatigue   + decreased appetite  + chronic back pain  + abdominal pain    No complaints of dizziness today     Denies mouth / tongue / throat pain,  cough, nausea, vomiting, diarrhea    Vital Signs Last 24 Hrs  T(C): 36.5 (2019 05:37), Max: 37.1 (2019 17:21)  T(F): 97.7 (2019 05:37), Max: 98.7 (2019 17:21)  HR: 80 (2019 05:37) (80 - 107)  BP: 104/67 (2019 05:37) (101/64 - 128/81)  BP(mean): --  RR: 16 (2019 05:37) (16 - 18)  SpO2: 99% (2019 05:37) (97% - 100%)    Admit weight: 104.3 kg  Daily Weight in k.3 kg    I&O's Summary    2019 07:01  -  2019 07:00  --------------------------------------------------------  IN: 1533 mL / OUT: 1100 mL / NET: 433 mL    PE:   Oropharynx: Clear  Oral Mucositis:   clear                                                     Grade:   CVS: RRR  Lungs: CTA  Abdomen: + BS, SNT  Extremities: warm and dry no swelling  Gastric Mucositis:                                                  Grade:   Intestinal Mucositis:                                              Grade:   Skin: No rash   PIV  Neuro: Awake alert and oriented  Pain: Denies    Labs:                         9.3    8.4   )-----------( 36       ( 2019 13:06 )             27.7     04-01    138  |  113<H>  |  27<H>  ----------------------------<  120<H>  4.8   |  15<L>  |  1.15    Ca    8.7      2019 13:06  Phos  2.2     04-  Mg     1.9     04-    TPro  4.5<L>  /  Alb  2.5<L>  /  TBili  0.2  /  DBili  x   /  AST  48<H>  /  ALT  28  /  AlkPhos  191<H>      CAPILLARY BLOOD GLUCOSE    PT/INR - ( 2019 13:06 )   PT: 12.2 sec;   INR: 1.07 ratio      PTT - ( 2019 17:42 )  PTT:34.7 sec    Cultures:     Culture - Blood (19 @ 21:18)    Specimen Source: .Blood Blood-Venous    Culture Results:   No growth to date.    Culture - Blood (19 @ 21:18)    Specimen Source: .Blood Blood-Peripheral    Culture Results:   No growth to date.    Radiology:     from: Xray Chest 1 View AP/PA (19 @ 16:25)     IMPRESSION: Clear lungs.    MEDICATIONS  (STANDING):  atovaquone Suspension 750 milliGRAM(s) Oral two times a day  Biotene Dry Mouth Oral Rinse 5 milliLiter(s) Swish and Spit five times a day  cefepime   IVPB 2000 milliGRAM(s) IV Intermittent daily  cefepime   IVPB      citric acid/sodium citrate Solution 30 milliLiter(s) Oral every 8 hours  dextrose 5% 1000 milliLiter(s) (75 mL/Hr) IV Continuous <Continuous>  fluconAZOLE   Tablet 200 milliGRAM(s) Oral daily  folic acid 1 milliGRAM(s) Oral daily  levETIRAcetam 500 milliGRAM(s) Oral two times a day  multivitamin 1 Tablet(s) Oral daily  mycophenolate mofetil 500 milliGRAM(s) Oral two times a day  pantoprazole    Tablet 40 milliGRAM(s) Oral before breakfast  tacrolimus 0.5 milliGRAM(s) Oral <User Schedule>  ursodiol Capsule 300 milliGRAM(s) Oral two times a day  valGANciclovir 450 milliGRAM(s) Oral daily  vancomycin    Solution 125 milliGRAM(s) Oral every 6 hours    MEDICATIONS  (PRN):  HYDROmorphone  Injectable 1 milliGRAM(s) IV Push every 6 hours PRN Severe Pain (7 - 10)  metoclopramide 5 milliGRAM(s) Oral Before meals and at bedtime PRN nausea  ondansetron    Tablet 8 milliGRAM(s) Oral three times a day PRN Nausea and/or Vomiting  ondansetron Injectable 8 milliGRAM(s) IV Push every 6 hours PRN Nausea and/or Vomiting  traMADol 25 milliGRAM(s) Oral every 12 hours PRN Mild Pain (1 - 3)    A/P:   59 year old F with a history of relapsed ALL  Status Post Allogeneic PBSCT 2019 now presents with low BP and diarrhea likely secondary to infection vs GVHD  Continues on MMF and Tacro  Last chimerism 3/22 100% donor  Follow CMV PCR, continues on Valcyte 450 mg PO daily, consider dose increase when NERI resolves.  Cr 1.15 from   Weight increase, cut back on IVF for now  Continue to hold antihypertensives    1. Infectious Disease:   atovaquone Suspension 750 milliGRAM(s) Oral two times a day  cefepime   IVPB 2000 milliGRAM(s) IV Intermittent daily  Fluconazole  200 milliGRAM(s) Oral daily  valGANciclovir 450 milliGRAM(s) Oral daily    2. VOD Prophylaxis: Actigall, Glutamine    3. GI Prophylaxis:  Protonix    4. Mouthcare - NS / NaHCO3 rinses, Mycelex, Biotene, skin care     5. GVHD prophylaxis     6. Transfuse & replete electrolytes prn     7. IV hydration, daily weights, strict I&O, prn diuresis   Weight gain, Cr improving, monitor closely     8. PO intake as tolerated, nutrition follow up as needed, MVI, folic acid   add Nepro, nutritionist to assess need   Consider nephro MVI    9. Antiemetics, anti-diarrhea medications:   Reglan, Ativan    10. OOB as tolerated, physical therapy consult if needed     11. Monitor coags / fibrinogen 2x week, vitamin K as needed     12. Monitor closely for clinical changes, monitor for fevers     13. Emotional support provided, plan of care discussed and questions addressed     14. Patient education done regarding chemotherapy prep, plan of care, restrictions and discharge planning     15. Continue regular social work input     16. check O2 sat on room air and post walk, O2 PRN    I have written the above note for Dr. Bentley who performed service with me in the room.   Antonia Camarillo, ANP-BC ([phone])    I have seen and examined patient with NP, I agree with above note as scribed. [facility] Transplant Team                                                      Critical / Counseling Time Provided: 30 minutes                                                                                                                                                                                                                                                                                                  Chief Complaint: hypotension, elevated WBC    Patient seen and examined with hospitals Transplant Team:     + fatigue   + decreased appetite  + chronic back pain  + abdominal pain    No complaints of dizziness today     Denies mouth / tongue / throat pain,  cough, nausea, vomiting, diarrhea    Vital Signs Last 24 Hrs  T(C): 36.5 (2019 05:37), Max: 37.1 (2019 17:21)  T(F): 97.7 (2019 05:37), Max: 98.7 (2019 17:21)  HR: 80 (2019 05:37) (80 - 107)  BP: 104/67 (2019 05:37) (101/64 - 128/81)  BP(mean): --  RR: 16 (2019 05:37) (16 - 18)  SpO2: 99% (2019 05:37) (97% - 100%)    Admit weight: 104.3 kg  Daily Weight in k.3 kg    I&O's Summary    2019 07:01  -  2019 07:00  --------------------------------------------------------  IN: 1533 mL / OUT: 1100 mL / NET: 433 mL    PE:   Oropharynx: Clear  Oral Mucositis:   clear                                                     Grade:   CVS: RRR  Lungs: CTA  Abdomen: + BS, SNT  Extremities: warm and dry no swelling  Gastric Mucositis:                                                  Grade:   Intestinal Mucositis:                                              Grade:   Skin: No rash   PIV  Neuro: Awake alert and oriented  Pain: Denies    Labs:                         9.3    8.4   )-----------( 36       ( 2019 13:06 )             27.7     04-01    138  |  113<H>  |  27<H>  ----------------------------<  120<H>  4.8   |  15<L>  |  1.15    Ca    8.7      2019 13:06  Phos  2.2     04-  Mg     1.9     04-    TPro  4.5<L>  /  Alb  2.5<L>  /  TBili  0.2  /  DBili  x   /  AST  48<H>  /  ALT  28  /  AlkPhos  191<H>      CAPILLARY BLOOD GLUCOSE    PT/INR - ( 2019 13:06 )   PT: 12.2 sec;   INR: 1.07 ratio      PTT - ( 2019 17:42 )  PTT:34.7 sec    Cultures:     Culture - Blood (19 @ 21:18)    Specimen Source: .Blood Blood-Venous    Culture Results:   No growth to date.    Culture - Blood (19 @ 21:18)    Specimen Source: .Blood Blood-Peripheral    Culture Results:   No growth to date.    Radiology:     from: Xray Chest 1 View AP/PA (19 @ 16:25)     IMPRESSION: Clear lungs.    MEDICATIONS  (STANDING):  atovaquone Suspension 750 milliGRAM(s) Oral two times a day  Biotene Dry Mouth Oral Rinse 5 milliLiter(s) Swish and Spit five times a day  cefepime   IVPB 2000 milliGRAM(s) IV Intermittent daily  cefepime   IVPB      citric acid/sodium citrate Solution 30 milliLiter(s) Oral every 8 hours  dextrose 5% 1000 milliLiter(s) (75 mL/Hr) IV Continuous <Continuous>  fluconAZOLE   Tablet 200 milliGRAM(s) Oral daily  folic acid 1 milliGRAM(s) Oral daily  levETIRAcetam 500 milliGRAM(s) Oral two times a day  multivitamin 1 Tablet(s) Oral daily  mycophenolate mofetil 500 milliGRAM(s) Oral two times a day  pantoprazole    Tablet 40 milliGRAM(s) Oral before breakfast  tacrolimus 0.5 milliGRAM(s) Oral <User Schedule>  ursodiol Capsule 300 milliGRAM(s) Oral two times a day  valGANciclovir 450 milliGRAM(s) Oral daily  vancomycin    Solution 125 milliGRAM(s) Oral every 6 hours    MEDICATIONS  (PRN):  HYDROmorphone  Injectable 1 milliGRAM(s) IV Push every 6 hours PRN Severe Pain (7 - 10)  metoclopramide 5 milliGRAM(s) Oral Before meals and at bedtime PRN nausea  ondansetron    Tablet 8 milliGRAM(s) Oral three times a day PRN Nausea and/or Vomiting  ondansetron Injectable 8 milliGRAM(s) IV Push every 6 hours PRN Nausea and/or Vomiting  traMADol 25 milliGRAM(s) Oral every 12 hours PRN Mild Pain (1 - 3)    A/P:   59 year old F with a history of relapsed ALL  Status Post Allogeneic PBSCT 2019 now presents with low BP and diarrhea likely secondary to infection vs GVHD  Continues on MMF and Tacro  Last chimerism 3/22 100% donor  Follow CMV PCR, continues on Valcyte 450 mg PO daily, consider dose increase when NERI resolves.  Cr 1.15 from   Weight increase, cut back on IVF for now  Continue to hold antihypertensives  CT abdomen and pelvis with PO contrast    1. Infectious Disease:   atovaquone Suspension 750 milliGRAM(s) Oral two times a day  cefepime   IVPB 2000 milliGRAM(s) IV Intermittent daily  Fluconazole  200 milliGRAM(s) Oral daily  valGANciclovir 450 milliGRAM(s) Oral daily    2. VOD Prophylaxis: Actigall, Glutamine    3. GI Prophylaxis:  Protonix    4. Mouthcare - NS / NaHCO3 rinses, Mycelex, Biotene, skin care     5. GVHD prophylaxis     6. Transfuse & replete electrolytes prn     7. IV hydration, daily weights, strict I&O, prn diuresis   Weight gain, Cr improving, monitor closely     8. PO intake as tolerated, nutrition follow up as needed, MVI, folic acid   add Omi, nutritionist to assess need   Consider nephro MVI    9. Antiemetics, anti-diarrhea medications:   Reglan, Ativan    10. OOB as tolerated, physical therapy consult if needed     11. Monitor coags / fibrinogen 2x week, vitamin K as needed     12. Monitor closely for clinical changes, monitor for fevers     13. Emotional support provided, plan of care discussed and questions addressed     14. Patient education done regarding chemotherapy prep, plan of care, restrictions and discharge planning     15. Continue regular social work input     16. check O2 sat on room air and post walk, O2 PRN    I have written the above note for Dr. Bentley who performed service with me in the room.   Antonia Camarillo, ANP-BC ([phone])    I have seen and examined patient with NP, I agree with above note as scribed.

## 2019-04-02 NOTE — DIETITIAN INITIAL EVALUATION ADULT. - PROBLEM SELECTOR PLAN 3
SA-NERI  Continue IVF  Renally dose all medications  Hold Tacro today  Hold Magnesium supplement until f/u CMP  Renal consult if not improved with IVF  Urine lytes  Serum osm in AM

## 2019-04-02 NOTE — DIETITIAN INITIAL EVALUATION ADULT. - FACTORS AFF FOOD INTAKE
pt reports she is trying to eat in house, reports she has a desire to eat but also has abdominal pain which is making it difficult for her to eat; denies any chewing/swallowing issues; reports she does have regular BMs no, reports no diarrhea at this time

## 2019-04-02 NOTE — DIETITIAN INITIAL EVALUATION ADULT. - PROBLEM SELECTOR PLAN 5
Continue Actigall for VOD prevention  Continue Keppra for h/o seizures  Conitnue Fluconazole for antifungal ppx  Continue Mepron for PCP ppx

## 2019-04-02 NOTE — DIETITIAN INITIAL EVALUATION ADULT. - ETIOLOGY
increased nutrient needs, suboptimal appetite, abdominal pain prolonged overall excessive energy intake coupled c suboptimal physical activity level

## 2019-04-03 ENCOUNTER — OUTPATIENT (OUTPATIENT)
Dept: OUTPATIENT SERVICES | Facility: HOSPITAL | Age: 59
LOS: 1 days | Discharge: HOME | End: 2019-04-03

## 2019-04-03 DIAGNOSIS — D17.9 BENIGN LIPOMATOUS NEOPLASM, UNSPECIFIED: Chronic | ICD-10-CM

## 2019-04-03 DIAGNOSIS — Z94.84 STEM CELLS TRANSPLANT STATUS: ICD-10-CM

## 2019-04-03 DIAGNOSIS — Z98.89 OTHER SPECIFIED POSTPROCEDURAL STATES: Chronic | ICD-10-CM

## 2019-04-03 DIAGNOSIS — Z41.9 ENCOUNTER FOR PROCEDURE FOR PURPOSES OTHER THAN REMEDYING HEALTH STATE, UNSPECIFIED: Chronic | ICD-10-CM

## 2019-04-03 DIAGNOSIS — C91.00 ACUTE LYMPHOBLASTIC LEUKEMIA NOT HAVING ACHIEVED REMISSION: ICD-10-CM

## 2019-04-03 LAB
ALBUMIN SERPL ELPH-MCNC: 2.3 G/DL — LOW (ref 3.3–5)
ALP SERPL-CCNC: 207 U/L — HIGH (ref 40–120)
ALT FLD-CCNC: 33 U/L — SIGNIFICANT CHANGE UP (ref 10–45)
AMYLASE P1 CFR SERPL: 50 U/L — SIGNIFICANT CHANGE UP (ref 25–125)
ANION GAP SERPL CALC-SCNC: 11 MMOL/L — SIGNIFICANT CHANGE UP (ref 5–17)
AST SERPL-CCNC: 58 U/L — HIGH (ref 10–40)
BASOPHILS # BLD AUTO: 0 K/UL — SIGNIFICANT CHANGE UP (ref 0–0.2)
BASOPHILS NFR BLD AUTO: 0.3 % — SIGNIFICANT CHANGE UP (ref 0–2)
BILIRUB SERPL-MCNC: 0.2 MG/DL — SIGNIFICANT CHANGE UP (ref 0.2–1.2)
BLD GP AB SCN SERPL QL: NEGATIVE — SIGNIFICANT CHANGE UP
BUN SERPL-MCNC: 15 MG/DL — SIGNIFICANT CHANGE UP (ref 7–23)
CALCIUM SERPL-MCNC: 8.5 MG/DL — SIGNIFICANT CHANGE UP (ref 8.4–10.5)
CHLORIDE SERPL-SCNC: 106 MMOL/L — SIGNIFICANT CHANGE UP (ref 96–108)
CMV DNA SPEC QL NAA+PROBE: ABNORMAL IU/ML
CMVPCR LOG: 4.57 LOGIU/ML
CO2 SERPL-SCNC: 21 MMOL/L — LOW (ref 22–31)
CREAT SERPL-MCNC: 0.88 MG/DL — SIGNIFICANT CHANGE UP (ref 0.5–1.3)
CULTURE RESULTS: SIGNIFICANT CHANGE UP
CULTURE RESULTS: SIGNIFICANT CHANGE UP
EOSINOPHIL # BLD AUTO: 0.2 K/UL — SIGNIFICANT CHANGE UP (ref 0–0.5)
EOSINOPHIL NFR BLD AUTO: 2.2 % — SIGNIFICANT CHANGE UP (ref 0–6)
FERRITIN SERPL-MCNC: 5019 NG/ML — HIGH (ref 15–150)
GLUCOSE SERPL-MCNC: 131 MG/DL — HIGH (ref 70–99)
HCT VFR BLD CALC: 27.3 % — LOW (ref 34.5–45)
HGB BLD-MCNC: 9.4 G/DL — LOW (ref 11.5–15.5)
LDH SERPL L TO P-CCNC: 458 U/L — HIGH (ref 50–242)
LIDOCAIN IGE QN: 25 U/L — SIGNIFICANT CHANGE UP (ref 7–60)
LYMPHOCYTES # BLD AUTO: 3.3 K/UL — SIGNIFICANT CHANGE UP (ref 1–3.3)
LYMPHOCYTES # BLD AUTO: 41.3 % — SIGNIFICANT CHANGE UP (ref 13–44)
MAGNESIUM SERPL-MCNC: 1.5 MG/DL — LOW (ref 1.6–2.6)
MCHC RBC-ENTMCNC: 34.4 GM/DL — SIGNIFICANT CHANGE UP (ref 32–36)
MCHC RBC-ENTMCNC: 34.4 PG — HIGH (ref 27–34)
MCV RBC AUTO: 99.8 FL — SIGNIFICANT CHANGE UP (ref 80–100)
MONOCYTES # BLD AUTO: 0.6 K/UL — SIGNIFICANT CHANGE UP (ref 0–0.9)
MONOCYTES NFR BLD AUTO: 7.5 % — SIGNIFICANT CHANGE UP (ref 2–14)
NEUTROPHILS # BLD AUTO: 3.9 K/UL — SIGNIFICANT CHANGE UP (ref 1.8–7.4)
NEUTROPHILS NFR BLD AUTO: 48.7 % — SIGNIFICANT CHANGE UP (ref 43–77)
PHOSPHATE SERPL-MCNC: 2.2 MG/DL — LOW (ref 2.5–4.5)
PLATELET # BLD AUTO: 29 K/UL — LOW (ref 150–400)
POTASSIUM SERPL-MCNC: 4.3 MMOL/L — SIGNIFICANT CHANGE UP (ref 3.5–5.3)
POTASSIUM SERPL-SCNC: 4.3 MMOL/L — SIGNIFICANT CHANGE UP (ref 3.5–5.3)
PROT SERPL-MCNC: 4.4 G/DL — LOW (ref 6–8.3)
RBC # BLD: 2.73 M/UL — LOW (ref 3.8–5.2)
RBC # FLD: 17.1 % — HIGH (ref 10.3–14.5)
RH IG SCN BLD-IMP: POSITIVE — SIGNIFICANT CHANGE UP
SODIUM SERPL-SCNC: 138 MMOL/L — SIGNIFICANT CHANGE UP (ref 135–145)
SPECIMEN SOURCE: SIGNIFICANT CHANGE UP
SPECIMEN SOURCE: SIGNIFICANT CHANGE UP
TACROLIMUS SERPL-MCNC: 2.7 NG/ML — SIGNIFICANT CHANGE UP
WBC # BLD: 7.9 K/UL — SIGNIFICANT CHANGE UP (ref 3.8–10.5)
WBC # FLD AUTO: 7.9 K/UL — SIGNIFICANT CHANGE UP (ref 3.8–10.5)

## 2019-04-03 PROCEDURE — 99291 CRITICAL CARE FIRST HOUR: CPT

## 2019-04-03 RX ORDER — MAGNESIUM SULFATE 500 MG/ML
2 VIAL (ML) INJECTION ONCE
Qty: 0 | Refills: 0 | Status: COMPLETED | OUTPATIENT
Start: 2019-04-03 | End: 2019-04-03

## 2019-04-03 RX ORDER — VALGANCICLOVIR 450 MG/1
450 TABLET, FILM COATED ORAL DAILY
Qty: 0 | Refills: 0 | Status: DISCONTINUED | OUTPATIENT
Start: 2019-04-03 | End: 2019-04-05

## 2019-04-03 RX ORDER — HYDROMORPHONE HYDROCHLORIDE 2 MG/ML
1 INJECTION INTRAMUSCULAR; INTRAVENOUS; SUBCUTANEOUS EVERY 4 HOURS
Qty: 0 | Refills: 0 | Status: DISCONTINUED | OUTPATIENT
Start: 2019-04-03 | End: 2019-04-06

## 2019-04-03 RX ORDER — VALGANCICLOVIR 450 MG/1
450 TABLET, FILM COATED ORAL
Qty: 0 | Refills: 0 | Status: DISCONTINUED | OUTPATIENT
Start: 2019-04-03 | End: 2019-04-03

## 2019-04-03 RX ORDER — METOCLOPRAMIDE HCL 10 MG
10 TABLET ORAL THREE TIMES A DAY
Qty: 0 | Refills: 0 | Status: DISCONTINUED | OUTPATIENT
Start: 2019-04-03 | End: 2019-04-10

## 2019-04-03 RX ORDER — POTASSIUM PHOSPHATE, MONOBASIC POTASSIUM PHOSPHATE, DIBASIC 236; 224 MG/ML; MG/ML
15 INJECTION, SOLUTION INTRAVENOUS ONCE
Qty: 0 | Refills: 0 | Status: COMPLETED | OUTPATIENT
Start: 2019-04-03 | End: 2019-04-03

## 2019-04-03 RX ADMIN — ATOVAQUONE 750 MILLIGRAM(S): 750 SUSPENSION ORAL at 05:58

## 2019-04-03 RX ADMIN — HYDROMORPHONE HYDROCHLORIDE 1 MILLIGRAM(S): 2 INJECTION INTRAMUSCULAR; INTRAVENOUS; SUBCUTANEOUS at 13:14

## 2019-04-03 RX ADMIN — HYDROMORPHONE HYDROCHLORIDE 1 MILLIGRAM(S): 2 INJECTION INTRAMUSCULAR; INTRAVENOUS; SUBCUTANEOUS at 10:42

## 2019-04-03 RX ADMIN — VALGANCICLOVIR 450 MILLIGRAM(S): 450 TABLET, FILM COATED ORAL at 11:46

## 2019-04-03 RX ADMIN — FLUCONAZOLE 200 MILLIGRAM(S): 150 TABLET ORAL at 11:57

## 2019-04-03 RX ADMIN — Medication 30 MILLILITER(S): at 17:28

## 2019-04-03 RX ADMIN — Medication 5 MILLILITER(S): at 11:45

## 2019-04-03 RX ADMIN — HYDROMORPHONE HYDROCHLORIDE 1 MILLIGRAM(S): 2 INJECTION INTRAMUSCULAR; INTRAVENOUS; SUBCUTANEOUS at 09:18

## 2019-04-03 RX ADMIN — HYDROMORPHONE HYDROCHLORIDE 1 MILLIGRAM(S): 2 INJECTION INTRAMUSCULAR; INTRAVENOUS; SUBCUTANEOUS at 17:50

## 2019-04-03 RX ADMIN — Medication 30 MILLILITER(S): at 00:28

## 2019-04-03 RX ADMIN — Medication 125 MILLIGRAM(S): at 23:20

## 2019-04-03 RX ADMIN — Medication 30 MILLILITER(S): at 11:45

## 2019-04-03 RX ADMIN — POTASSIUM PHOSPHATE, MONOBASIC POTASSIUM PHOSPHATE, DIBASIC 62.5 MILLIMOLE(S): 236; 224 INJECTION, SOLUTION INTRAVENOUS at 13:18

## 2019-04-03 RX ADMIN — Medication 5 MILLILITER(S): at 21:25

## 2019-04-03 RX ADMIN — LEVETIRACETAM 500 MILLIGRAM(S): 250 TABLET, FILM COATED ORAL at 17:29

## 2019-04-03 RX ADMIN — TRAMADOL HYDROCHLORIDE 25 MILLIGRAM(S): 50 TABLET ORAL at 00:52

## 2019-04-03 RX ADMIN — Medication 1 MILLIGRAM(S): at 11:57

## 2019-04-03 RX ADMIN — TACROLIMUS 0.5 MILLIGRAM(S): 5 CAPSULE ORAL at 21:26

## 2019-04-03 RX ADMIN — Medication 125 MILLIGRAM(S): at 11:57

## 2019-04-03 RX ADMIN — Medication 5 MILLILITER(S): at 08:00

## 2019-04-03 RX ADMIN — Medication 10 MILLIGRAM(S): at 21:26

## 2019-04-03 RX ADMIN — TACROLIMUS 0.5 MILLIGRAM(S): 5 CAPSULE ORAL at 10:22

## 2019-04-03 RX ADMIN — Medication 125 MILLIGRAM(S): at 17:27

## 2019-04-03 RX ADMIN — Medication 50 GRAM(S): at 11:59

## 2019-04-03 RX ADMIN — Medication 5 MILLILITER(S): at 17:30

## 2019-04-03 RX ADMIN — HYDROMORPHONE HYDROCHLORIDE 1 MILLIGRAM(S): 2 INJECTION INTRAMUSCULAR; INTRAVENOUS; SUBCUTANEOUS at 21:24

## 2019-04-03 RX ADMIN — Medication 125 MILLIGRAM(S): at 05:58

## 2019-04-03 RX ADMIN — LEVETIRACETAM 500 MILLIGRAM(S): 250 TABLET, FILM COATED ORAL at 05:58

## 2019-04-03 RX ADMIN — ATOVAQUONE 750 MILLIGRAM(S): 750 SUSPENSION ORAL at 17:29

## 2019-04-03 RX ADMIN — HYDROMORPHONE HYDROCHLORIDE 1 MILLIGRAM(S): 2 INJECTION INTRAMUSCULAR; INTRAVENOUS; SUBCUTANEOUS at 13:35

## 2019-04-03 RX ADMIN — Medication 5 MILLILITER(S): at 23:20

## 2019-04-03 RX ADMIN — PANTOPRAZOLE SODIUM 40 MILLIGRAM(S): 20 TABLET, DELAYED RELEASE ORAL at 06:02

## 2019-04-03 RX ADMIN — HYDROMORPHONE HYDROCHLORIDE 1 MILLIGRAM(S): 2 INJECTION INTRAMUSCULAR; INTRAVENOUS; SUBCUTANEOUS at 17:27

## 2019-04-03 RX ADMIN — HYDROMORPHONE HYDROCHLORIDE 1 MILLIGRAM(S): 2 INJECTION INTRAMUSCULAR; INTRAVENOUS; SUBCUTANEOUS at 02:55

## 2019-04-03 RX ADMIN — URSODIOL 300 MILLIGRAM(S): 250 TABLET, FILM COATED ORAL at 05:58

## 2019-04-03 RX ADMIN — HYDROMORPHONE HYDROCHLORIDE 1 MILLIGRAM(S): 2 INJECTION INTRAMUSCULAR; INTRAVENOUS; SUBCUTANEOUS at 22:18

## 2019-04-03 RX ADMIN — MYCOPHENOLATE MOFETIL 500 MILLIGRAM(S): 250 CAPSULE ORAL at 05:58

## 2019-04-03 RX ADMIN — Medication 1 TABLET(S): at 11:59

## 2019-04-03 RX ADMIN — HYDROMORPHONE HYDROCHLORIDE 1 MILLIGRAM(S): 2 INJECTION INTRAMUSCULAR; INTRAVENOUS; SUBCUTANEOUS at 03:15

## 2019-04-03 RX ADMIN — URSODIOL 300 MILLIGRAM(S): 250 TABLET, FILM COATED ORAL at 17:27

## 2019-04-03 RX ADMIN — Medication 10 MILLIGRAM(S): at 13:17

## 2019-04-03 NOTE — PHYSICAL THERAPY INITIAL EVALUATION ADULT - IMPAIRMENTS CONTRIBUTING TO GAIT DEVIATIONS, PT EVAL
[facility]    [address] [place] [address]    Phone:  [phone]    Fax:  [phone]                                       Chloe Obando   MRN: [id number]    Department:  [facility]   Date of Visit:  1/3/2017           After Visit Summary Signature Page     I have received my discharge instructions, and my questions have been answered. I have discussed any challenges I see with this plan with the nurse or doctor.    ..........................................................................................................................................  Patient/Patient Representative Signature      ..........................................................................................................................................  Patient Representative Print Name and Relationship to Patient    ..................................................               ................................................  Date                                            Time    ..........................................................................................................................................  Reviewed by Signature/Title    ...................................................              ..............................................  Date                                                            Time           impaired balance

## 2019-04-03 NOTE — PROGRESS NOTE ADULT - ATTENDING COMMENTS
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  cmv pos   Renally adjusted; Cefepime 2G daily  Vanco 1G once, continue pending blood cultures  resume po vanco for cdiff....would rather taper slowly   Tacro level from 3/30 low. on  0.5 in bid..level pending....dose decreased b/c of increased creatinine  Continue MMF 1G PO BID..taper held b/c upper GI sx and rash...may be reason counts are dropping along with the valcyte  Most recent FISH for Y on 3/22 100% donor...repeat cmv pcr  manage bicarb  Continue Actigall for VOD prevention  Continue Keppra for h/o seizures  Contnue Fluconazole for antifungal ppx  Continue Mepron for PCP ppx  Continue supportive care. 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  cmv pos..ct with vague findings in pancreas..amylase and lipase nl..unclear etiology of upper GI discomfort...?? related to virus   Renally adjusted; Cefepime 2G daily...will stop  Vanco 1G once, continue pending blood cultures..cx are neg  resume po vanco for cdiff....would rather taper slowly   Tacro level from 3/30 low. on  0.5 in bid..level 5....dose decreased b/c of increased creatinine  Continue MMF 1G PO BID..taper held b/c upper GI sx and rash...may be reason counts are dropping along with the valcyte...stop today...no gvhd  Most recent FISH for Y on 3/22 100% donor...repeat cmv pcr improved  manage bicarb  Continue Actigall for VOD prevention  Continue Keppra for h/o seizures  Contnue Fluconazole for antifungal ppx  Continue Mepron for PCP ppx  Continue supportive care.

## 2019-04-03 NOTE — PROGRESS NOTE ADULT - SUBJECTIVE AND OBJECTIVE BOX
\[facility]\"" Transplant Team                                                      Critical / Counseling Time Provided: 30 minutes                                                                                                                                                                                                                                                                                                  Chief Complaint: hypotension, elevated WBC    Patient seen and examined with \[facility]\"" Transplant Team:     + fatigue   + decreased appetite  + chronic back pain  + abdominal pain    No complaints of dizziness today     Denies mouth / tongue / throat pain,  cough, nausea, vomiting, diarrhea    Vital Signs Last 24 Hrs  T(C): 36.8 (03 Apr 2019 05:33), Max: 36.9 (03 Apr 2019 00:24)  T(F): 98.3 (03 Apr 2019 05:33), Max: 98.4 (03 Apr 2019 00:24)  HR: 89 (03 Apr 2019 05:33) (84 - 120)  BP: 108/72 (03 Apr 2019 05:33) (104/68 - 129/88)  BP(mean): --  RR: 18 (03 Apr 2019 05:33) (16 - 18)  SpO2: 98% (03 Apr 2019 05:33) (96% - 99%)    Admit weight: 104.3 kg  Daily Weight in kG:     I&O's Summary    02 Apr 2019 07:01  -  03 Apr 2019 07:00  --------------------------------------------------------  IN: 840 mL / OUT: 800 mL / NET: 40 mL    PE:   Oropharynx: Clear  Oral Mucositis:   clear                                                     Grade:   CVS: RRR  Lungs: CTA  Abdomen: + BS, SNT  Extremities: warm and dry no swelling  Gastric Mucositis:                                                  Grade:   Intestinal Mucositis:                                              Grade:   Skin: No rash   PIV  Neuro: Awake alert and oriented  Pain: Denies    Labs:                         9.4    7.3   )-----------( 30       ( 02 Apr 2019 10:25 )             28.1     04-02    139  |  109<H>  |  20  ----------------------------<  125<H>  4.0   |  20<L>  |  1.09    Ca    8.6      02 Apr 2019 10:25  Phos  2.3     04-02  Mg     1.7     04-02    TPro  4.5<L>  /  Alb  2.6<L>  /  TBili  0.2  /  DBili  x   /  AST  46<H>  /  ALT  28  /  AlkPhos  195<H>  04-02    CAPILLARY BLOOD GLUCOSE    PT/INR - ( 01 Apr 2019 13:06 )   PT: 12.2 sec;   INR: 1.07 ratio       PTT - ( 01 Apr 2019 17:42 )  PTT:34.7 sec    Cultures:     Culture - Blood (03.29.19 @ 21:18)    Specimen Source: .Blood Blood-Venous    Culture Results:   No growth to date.    Culture - Blood (03.29.19 @ 21:18)    Specimen Source: .Blood Blood-Peripheral    Culture Results:   No growth to date.    Radiology:     from: Xray Chest 1 View AP/PA (03.29.19 @ 16:25)     IMPRESSION: Clear lungs.    MEDICATIONS  (STANDING):  atovaquone Suspension 750 milliGRAM(s) Oral two times a day  Biotene Dry Mouth Oral Rinse 5 milliLiter(s) Swish and Spit five times a day  cefepime   IVPB 2000 milliGRAM(s) IV Intermittent daily  cefepime   IVPB      citric acid/sodium citrate Solution 30 milliLiter(s) Oral every 12 hours  fluconAZOLE   Tablet 200 milliGRAM(s) Oral daily  folic acid 1 milliGRAM(s) Oral daily  levETIRAcetam 500 milliGRAM(s) Oral two times a day  multivitamin 1 Tablet(s) Oral daily  mycophenolate mofetil 500 milliGRAM(s) Oral two times a day  pantoprazole    Tablet 40 milliGRAM(s) Oral before breakfast  tacrolimus 0.5 milliGRAM(s) Oral <User Schedule>  ursodiol Capsule 300 milliGRAM(s) Oral two times a day  valGANciclovir 450 milliGRAM(s) Oral two times a day  vancomycin    Solution 125 milliGRAM(s) Oral every 6 hours    MEDICATIONS  (PRN):  HYDROmorphone  Injectable 1 milliGRAM(s) IV Push every 6 hours PRN Severe Pain (7 - 10)  metoclopramide 5 milliGRAM(s) Oral Before meals and at bedtime PRN nausea  ondansetron    Tablet 8 milliGRAM(s) Oral three times a day PRN Nausea and/or Vomiting  ondansetron Injectable 8 milliGRAM(s) IV Push every 6 hours PRN Nausea and/or Vomiting  traMADol 25 milliGRAM(s) Oral every 12 hours PRN Mild Pain (1 - 3)    A/P:   59 year 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  F/U amylase and lipase from 4/3    1. Infectious Disease:   atovaquone Suspension 750 milliGRAM(s) Oral two times a day  cefepime   IVPB 2000 milliGRAM(s) IV Intermittent daily  Fluconazole  200 milliGRAM(s) Oral daily  valGANciclovir 450 milliGRAM(s) Oral daily    2. VOD Prophylaxis: Actigall, Glutamine    3. GI Prophylaxis:  Protonix    4. Mouthcare - NS / NaHCO3 rinses, Mycelex, Biotene, skin care     5. GVHD prophylaxis     6. Transfuse & replete electrolytes prn     7. IV hydration, daily weights, strict I&O, prn diuresis   Weight gain, Cr improving, monitor closely     8. PO intake as tolerated, nutrition follow up as needed, MVI, folic acid   On Nepro    9. Antiemetics, anti-diarrhea medications:   Reglan, Ativan    10. OOB as tolerated, physical therapy consult if needed     11. Monitor coags / fibrinogen 2x week, vitamin K as needed     12. Monitor closely for clinical changes, monitor for fevers     13. Emotional support provided, plan of care discussed and questions addressed     14. Patient education done regarding chemotherapy prep, plan of care, restrictions and discharge planning     15. Continue regular social work input     16. check O2 sat on room air and post walk, O2 PRN    I have written the above note for Dr. Bentley who performed service with me in the room.   Antonia Camarillo, ANP-BC ([phone])    I have seen and examined patient with NP, I agree with above note as scribed. \[facility]\"" Transplant Team                                                      Critical / Counseling Time Provided: 30 minutes                                                                                                                                                                                                                                                                                                  Chief Complaint: hypotension, elevated WBC    Patient seen and examined with \[facility]\"" Transplant Team:     + fatigue   + decreased appetite  + chronic back pain  + abdominal pain    No complaints of dizziness today     Denies mouth / tongue / throat pain,  cough, nausea, vomiting, diarrhea    Vital Signs Last 24 Hrs  T(C): 36.8 (03 Apr 2019 05:33), Max: 36.9 (03 Apr 2019 00:24)  T(F): 98.3 (03 Apr 2019 05:33), Max: 98.4 (03 Apr 2019 00:24)  HR: 89 (03 Apr 2019 05:33) (84 - 120)  BP: 108/72 (03 Apr 2019 05:33) (104/68 - 129/88)  BP(mean): --  RR: 18 (03 Apr 2019 05:33) (16 - 18)  SpO2: 98% (03 Apr 2019 05:33) (96% - 99%)    Admit weight: 104.3 kg  Daily Weight in kG:     I&O's Summary    02 Apr 2019 07:01  -  03 Apr 2019 07:00  --------------------------------------------------------  IN: 840 mL / OUT: 800 mL / NET: 40 mL    PE:   Oropharynx: Clear  Oral Mucositis:   clear                                                     Grade:   CVS: RRR  Lungs: CTA  Abdomen: + BS, SNT  Extremities: warm and dry no swelling  Gastric Mucositis:                                                  Grade:   Intestinal Mucositis:                                              Grade:   Skin: No rash   PIV  Neuro: Awake alert and oriented  Pain: Denies    Labs:                         9.4    7.3   )-----------( 30       ( 02 Apr 2019 10:25 )             28.1     04-02    139  |  109<H>  |  20  ----------------------------<  125<H>  4.0   |  20<L>  |  1.09    Ca    8.6      02 Apr 2019 10:25  Phos  2.3     04-02  Mg     1.7     04-02    TPro  4.5<L>  /  Alb  2.6<L>  /  TBili  0.2  /  DBili  x   /  AST  46<H>  /  ALT  28  /  AlkPhos  195<H>  04-02    CAPILLARY BLOOD GLUCOSE    PT/INR - ( 01 Apr 2019 13:06 )   PT: 12.2 sec;   INR: 1.07 ratio       PTT - ( 01 Apr 2019 17:42 )  PTT:34.7 sec    Cultures:     Culture - Blood (03.29.19 @ 21:18)    Specimen Source: .Blood Blood-Venous    Culture Results:   No growth to date.    Culture - Blood (03.29.19 @ 21:18)    Specimen Source: .Blood Blood-Peripheral    Culture Results:   No growth to date.    Radiology:     from: Xray Chest 1 View AP/PA (03.29.19 @ 16:25)     IMPRESSION: Clear lungs.    MEDICATIONS  (STANDING):  atovaquone Suspension 750 milliGRAM(s) Oral two times a day  Biotene Dry Mouth Oral Rinse 5 milliLiter(s) Swish and Spit five times a day  cefepime   IVPB 2000 milliGRAM(s) IV Intermittent daily  cefepime   IVPB      citric acid/sodium citrate Solution 30 milliLiter(s) Oral every 12 hours  fluconAZOLE   Tablet 200 milliGRAM(s) Oral daily  folic acid 1 milliGRAM(s) Oral daily  levETIRAcetam 500 milliGRAM(s) Oral two times a day  multivitamin 1 Tablet(s) Oral daily  mycophenolate mofetil 500 milliGRAM(s) Oral two times a day  pantoprazole    Tablet 40 milliGRAM(s) Oral before breakfast  tacrolimus 0.5 milliGRAM(s) Oral <User Schedule>  ursodiol Capsule 300 milliGRAM(s) Oral two times a day  valGANciclovir 450 milliGRAM(s) Oral two times a day  vancomycin    Solution 125 milliGRAM(s) Oral every 6 hours    MEDICATIONS  (PRN):  HYDROmorphone  Injectable 1 milliGRAM(s) IV Push every 6 hours PRN Severe Pain (7 - 10)  metoclopramide 5 milliGRAM(s) Oral Before meals and at bedtime PRN nausea  ondansetron    Tablet 8 milliGRAM(s) Oral three times a day PRN Nausea and/or Vomiting  ondansetron Injectable 8 milliGRAM(s) IV Push every 6 hours PRN Nausea and/or Vomiting  traMADol 25 milliGRAM(s) Oral every 12 hours PRN Mild Pain (1 - 3)    A/P:   59 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  F/U amylase and lipase from 4/3    1. Infectious Disease:   atovaquone Suspension 750 milliGRAM(s) Oral two times a day  cefepime   IVPB 2000 milliGRAM(s) IV Intermittent daily  Fluconazole  200 milliGRAM(s) Oral daily  valGANciclovir 450 milliGRAM(s) Oral daily    2. VOD Prophylaxis: Actigall, Glutamine    3. GI Prophylaxis:  Protonix    4. Mouthcare - NS / NaHCO3 rinses, Mycelex, Biotene, skin care     5. GVHD prophylaxis     6. Transfuse & replete electrolytes prn     7. IV hydration, daily weights, strict I&O, prn diuresis   Weight gain, Cr improving, monitor closely     8. PO intake as tolerated, nutrition follow up as needed, MVI, folic acid   On Nepro    9. Antiemetics, anti-diarrhea medications:   Reglan, Ativan    10. OOB as tolerated, physical therapy consult if needed     11. Monitor coags / fibrinogen 2x week, vitamin K as needed     12. Monitor closely for clinical changes, monitor for fevers     13. Emotional support provided, plan of care discussed and questions addressed     14. Patient education done regarding chemotherapy prep, plan of care, restrictions and discharge planning     15. Continue regular social work input     16. check O2 sat on room air and post walk, O2 PRN    I have written the above note for Dr. Bentley who performed service with me in the room.   Antonia Camarillo, ANP-BC ([phone])    I have seen and examined patient with NP, I agree with above note as scribed. [facility]plant Team                                                      Critical / Counseling Time Provided: 30 minutes                                                                                                                                                                                                                                                                                                  Chief Complaint: hypotension, elevated WBC    Patient seen and examined with [facility] Transplant Team:     + fatigue   + decreased appetite  + chronic back pain  + abdominal pain    No complaints of dizziness today     Denies mouth / tongue / throat pain,  cough, nausea, vomiting, diarrhea    Vital Signs Last 24 Hrs  T(C): 36.8 (03 Apr 2019 05:33), Max: 36.9 (03 Apr 2019 00:24)  T(F): 98.3 (03 Apr 2019 05:33), Max: 98.4 (03 Apr 2019 00:24)  HR: 89 (03 Apr 2019 05:33) (84 - 120)  BP: 108/72 (03 Apr 2019 05:33) (104/68 - 129/88)  BP(mean): --  RR: 18 (03 Apr 2019 05:33) (16 - 18)  SpO2: 98% (03 Apr 2019 05:33) (96% - 99%)    Admit weight: 104.3 kg  Daily Weight in kG:     I&O's Summary    02 Apr 2019 07:01  -  03 Apr 2019 07:00  --------------------------------------------------------  IN: 840 mL / OUT: 800 mL / NET: 40 mL    PE:   Oropharynx: Clear  Oral Mucositis:   clear                                                     Grade:   CVS: RRR  Lungs: CTA  Abdomen: + BS, SNT  Extremities: warm and dry no swelling  Gastric Mucositis:                                                  Grade:   Intestinal Mucositis:                                              Grade:   Skin: No rash   PIV  Neuro: Awake alert and oriented  Pain: Denies    Karnofsky / Lansky Scale: 50%  GVHD: no evidence of acute or chronic GVHD   Skin: 0  Liver: 0  Gut: 0  Overall stGstrstastdstest:st st1st Labs:                         9.4    7.3   )-----------( 30       ( 02 Apr 2019 10:25 )             28.1     04-02    139  |  109<H>  |  20  ----------------------------<  125<H>  4.0   |  20<L>  |  1.09    Ca    8.6      02 Apr 2019 10:25  Phos  2.3     04-02  Mg     1.7     04-02    TPro  4.5<L>  /  Alb  2.6<L>  /  TBili  0.2  /  DBili  x   /  AST  46<H>  /  ALT  28  /  AlkPhos  195<H>  04-02    CAPILLARY BLOOD GLUCOSE    PT/INR - ( 01 Apr 2019 13:06 )   PT: 12.2 sec;   INR: 1.07 ratio       PTT - ( 01 Apr 2019 17:42 )  PTT:34.7 sec    Cultures:     Culture - Blood (03.29.19 @ 21:18)    Specimen Source: .Blood Blood-Venous    Culture Results:   No growth to date.    Culture - Blood (03.29.19 @ 21:18)    Specimen Source: .Blood Blood-Peripheral    Culture Results:   No growth to date.    Radiology:     from: Xray Chest 1 View AP/PA (03.29.19 @ 16:25)     IMPRESSION: Clear lungs.    MEDICATIONS  (STANDING):  atovaquone Suspension 750 milliGRAM(s) Oral two times a day  Biotene Dry Mouth Oral Rinse 5 milliLiter(s) Swish and Spit five times a day  cefepime   IVPB 2000 milliGRAM(s) IV Intermittent daily  cefepime   IVPB      citric acid/sodium citrate Solution 30 milliLiter(s) Oral every 12 hours  fluconAZOLE   Tablet 200 milliGRAM(s) Oral daily  folic acid 1 milliGRAM(s) Oral daily  levETIRAcetam 500 milliGRAM(s) Oral two times a day  multivitamin 1 Tablet(s) Oral daily  mycophenolate mofetil 500 milliGRAM(s) Oral two times a day  pantoprazole    Tablet 40 milliGRAM(s) Oral before breakfast  tacrolimus 0.5 milliGRAM(s) Oral <User Schedule>  ursodiol Capsule 300 milliGRAM(s) Oral two times a day  valGANciclovir 450 milliGRAM(s) Oral two times a day  vancomycin    Solution 125 milliGRAM(s) Oral every 6 hours    MEDICATIONS  (PRN):  HYDROmorphone  Injectable 1 milliGRAM(s) IV Push every 6 hours PRN Severe Pain (7 - 10)  metoclopramide 5 milliGRAM(s) Oral Before meals and at bedtime PRN nausea  ondansetron    Tablet 8 milliGRAM(s) Oral three times a day PRN Nausea and/or Vomiting  ondansetron Injectable 8 milliGRAM(s) IV Push every 6 hours PRN Nausea and/or Vomiting  traMADol 25 milliGRAM(s) Oral every 12 hours PRN Mild Pain (1 - 3)    A/P:   59 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  F/U amylase and lipase from 4/3    1. Infectious Disease:   atovaquone Suspension 750 milliGRAM(s) Oral two times a day  cefepime   IVPB 2000 milliGRAM(s) IV Intermittent daily  Fluconazole  200 milliGRAM(s) Oral daily  valGANciclovir 450 milliGRAM(s) Oral daily    2. VOD Prophylaxis: Actigall, Glutamine    3. GI Prophylaxis:  Protonix    4. Mouthcare - NS / NaHCO3 rinses, Mycelex, Biotene, skin care     5. GVHD prophylaxis     6. Transfuse & replete electrolytes prn     7. IV hydration, daily weights, strict I&O, prn diuresis   Weight gain, Cr improving, monitor closely     8. PO intake as tolerated, nutrition follow up as needed, MVI, folic acid   On Nepro    9. Antiemetics, anti-diarrhea medications:   Reglan, Ativan    10. OOB as tolerated, physical therapy consult if needed     11. Monitor coags / fibrinogen 2x week, vitamin K as needed     12. Monitor closely for clinical changes, monitor for fevers     13. Emotional support provided, plan of care discussed and questions addressed     14. Patient education done regarding chemotherapy prep, plan of care, restrictions and discharge planning     15. Continue regular social work input     16. check O2 sat on room air and post walk, O2 PRN    I have written the above note for Dr. Bentley who performed service with me in the room.   Antonia Camarillo, ANP-BC ([phone])    I have seen and examined patient with NP, I agree with above note as scribed. [facility]plant [facility]                                                      Critical / Counseling Time Provided: 30 minutes                                                                                                                                                                                                                                                                                                  Chief Complaint: hypotension, elevated WBC    Patient seen and examined with [facility] Transplant Team:     + fatigue   + decreased appetite  + chronic back pain  + abdominal pain    No complaints of dizziness today     Denies mouth / tongue / throat pain,  cough, nausea, vomiting, diarrhea    Vital Signs Last 24 Hrs  T(C): 36.8 (2019 05:33), Max: 36.9 (2019 00:24)  T(F): 98.3 (2019 05:33), Max: 98.4 (2019 00:24)  HR: 89 (2019 05:33) (84 - 120)  BP: 108/72 (2019 05:33) (104/68 - 129/88)  BP(mean): --  RR: 18 (2019 05:33) (16 - 18)  SpO2: 98% (2019 05:33) (96% - 99%)    Admit weight: 104.3 kg  Daily Weight in k.4 kg    I&O's Summary    2019 07:01  -  2019 07:00  --------------------------------------------------------  IN: 840 mL / OUT: 800 mL / NET: 40 mL    PE:   Oropharynx: Clear  Oral Mucositis:   clear                                                     Grade:   CVS: RRR  Lungs: CTA  Abdomen: + BS, SNT  Extremities: warm and dry no swelling  Gastric Mucositis:                                                  Grade:   Intestinal Mucositis:                                              Grade:   Skin: No rash   PIV  Neuro: Awake alert and oriented  Pain: Denies    Karnofsky / Lansky Scale: 50%  GVHD: no evidence of acute or chronic GVHD   Skin: 0  Liver: 0  Gut: 0  Overall stGstrstastdstest:st st1st Labs:                         9.4    7.3   )-----------( 30       ( 2019 10:25 )             28.1     04-02    139  |  109<H>  |  20  ----------------------------<  125<H>  4.0   |  20<L>  |  1.09    Ca    8.6      2019 10:25  Phos  2.3     -  Mg     1.7     -    TPro  4.5<L>  /  Alb  2.6<L>  /  TBili  0.2  /  DBili  x   /  AST  46<H>  /  ALT  28  /  AlkPhos  195<H>  -    CAPILLARY BLOOD GLUCOSE    PT/INR - ( 2019 13:06 )   PT: 12.2 sec;   INR: 1.07 ratio       PTT - ( 2019 17:42 )  PTT:34.7 sec    Cultures:     Culture - Blood (19 @ 21:18)    Specimen Source: .Blood Blood-Venous    Culture Results:   No growth to date.    Culture - Blood (19 @ 21:18)    Specimen Source: .Blood Blood-Peripheral    Culture Results:   No growth to date.    Radiology:     from: Xray Chest 1 View AP/PA (19 @ 16:25)     IMPRESSION: Clear lungs.    MEDICATIONS  (STANDING):  atovaquone Suspension 750 milliGRAM(s) Oral two times a day  Biotene Dry Mouth Oral Rinse 5 milliLiter(s) Swish and Spit five times a day  cefepime   IVPB 2000 milliGRAM(s) IV Intermittent daily  cefepime   IVPB      citric acid/sodium citrate Solution 30 milliLiter(s) Oral every 12 hours  fluconAZOLE   Tablet 200 milliGRAM(s) Oral daily  folic acid 1 milliGRAM(s) Oral daily  levETIRAcetam 500 milliGRAM(s) Oral two times a day  multivitamin 1 Tablet(s) Oral daily  mycophenolate mofetil 500 milliGRAM(s) Oral two times a day  pantoprazole    Tablet 40 milliGRAM(s) Oral before breakfast  tacrolimus 0.5 milliGRAM(s) Oral <User Schedule>  ursodiol Capsule 300 milliGRAM(s) Oral two times a day  valGANciclovir 450 milliGRAM(s) Oral two times a day  vancomycin    Solution 125 milliGRAM(s) Oral every 6 hours    MEDICATIONS  (PRN):  HYDROmorphone  Injectable 1 milliGRAM(s) IV Push every 6 hours PRN Severe Pain (7 - 10)  metoclopramide 5 milliGRAM(s) Oral Before meals and at bedtime PRN nausea  ondansetron    Tablet 8 milliGRAM(s) Oral three times a day PRN Nausea and/or Vomiting  ondansetron Injectable 8 milliGRAM(s) IV Push every 6 hours PRN Nausea and/or Vomiting  traMADol 25 milliGRAM(s) Oral every 12 hours PRN Mild Pain (1 - 3)    A/P:   59 year old 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  Amylase and lipase WNL.  Calorie count x 72 hours, reglan IVP pre-meals.  Trial of Dilaudid q 4 hours for 24 hours.    1. Infectious Disease:   atovaquone Suspension 750 milliGRAM(s) Oral two times a day  cefepime   IVPB 2000 milliGRAM(s) IV Intermittent daily  Fluconazole  200 milliGRAM(s) Oral daily  valGANciclovir 450 milliGRAM(s) Oral daily    2. VOD Prophylaxis: Actigall, Glutamine    3. GI Prophylaxis:  Protonix    4. Mouthcare - NS / NaHCO3 rinses, Mycelex, Biotene, skin care     5. GVHD prophylaxis     6. Transfuse & replete electrolytes prn     7. IV hydration, daily weights, strict I&O, prn diuresis   Weight gain, Cr improving, monitor closely     8. PO intake as tolerated, nutrition follow up as needed, MVI, folic acid   On Nepro    9. Antiemetics, anti-diarrhea medications:   Reglan, Ativan    10. OOB as tolerated, physical therapy consult if needed     11. Monitor coags / fibrinogen 2x week, vitamin K as needed     12. Monitor closely for clinical changes, monitor for fevers     13. Emotional support provided, plan of care discussed and questions addressed     14. Patient education done regarding chemotherapy prep, plan of care, restrictions and discharge planning     15. Continue regular social work input     16. check O2 sat on room air and post walk, O2 PRN    I have written the above note for Dr. Bentley who performed service with me in the room.   Antonia Camarillo, ANP-BC ([phone])    I have seen and examined patient with NP, I agree with above note as scribed. [facility]                                                      Critical / Counseling Time Provided: 30 minutes                                                                                                                                                                                                                                                                                                  Chief Complaint: hypotension, elevated WBC    Patient [facility] and examined with [facility] Transplant Team:     + fatigue   + decreased appetite  + chronic back pain  + abdominal pain    No complaints of dizziness today     Denies mouth / tongue / throat pain,  cough, nausea, vomiting, diarrhea    Vital Signs Last 24 Hrs  T(C): 36.8 (2019 05:33), Max: 36.9 (2019 00:24)  T(F): 98.3 (2019 05:33), Max: 98.4 (2019 00:24)  HR: 89 (2019 05:33) (84 - 120)  BP: 108/72 (2019 05:33) (104/68 - 129/88)  BP(mean): --  RR: 18 (2019 05:33) (16 - 18)  SpO2: 98% (2019 05:33) (96% - 99%)    Admit weight: 104.3 kg  Daily Weight in k.4 kg    I&O's Summary    2019 07:01  -  2019 07:00  --------------------------------------------------------  IN: 840 mL / OUT: 800 mL / NET: 40 mL    PE:   Oropharynx: Clear  Oral Mucositis:   clear                                                     Grade:   CVS: RRR  Lungs: CTA  Abdomen: + BS, SNT  Extremities: warm and dry no swelling  Gastric Mucositis:                                                  Grade:   Intestinal Mucositis:                                              Grade:   Skin: No rash   PIV  Neuro: Awake alert and oriented  Pain: Denies    Karnofsky / Lansky Scale: 50%  GVHD: no evidence of acute or chronic GVHD   Skin: 0  Liver: 0  Gut: 0  Overall stGstrstastdstest:st st1st Labs:                         9.4    7.3   )-----------( 30       ( 2019 10:25 )             28.1     04-02    139  |  109<H>  |  20  ----------------------------<  125<H>  4.0   |  20<L>  |  1.09    Ca    8.6      2019 10:25  Phos  2.3     -  Mg     1.7     -    TPro  4.5<L>  /  Alb  2.6<L>  /  TBili  0.2  /  DBili  x   /  AST  46<H>  /  ALT  28  /  AlkPhos  195<H>  -    CAPILLARY BLOOD GLUCOSE    PT/INR - ( 2019 13:06 )   PT: 12.2 sec;   INR: 1.07 ratio       PTT - ( 2019 17:42 )  PTT:34.7 sec    Cultures:     Culture - Blood (19 @ 21:18)    Specimen Source: .Blood Blood-Venous    Culture Results:   No growth to date.    Culture - Blood (19 @ 21:18)    Specimen Source: .Blood Blood-Peripheral    Culture Results:   No growth to date.    Radiology:     from: Xray Chest 1 View AP/PA (19 @ 16:25)     IMPRESSION: Clear lungs.    MEDICATIONS  (STANDING):  atovaquone Suspension 750 milliGRAM(s) Oral two times a day  Biotene Dry Mouth Oral Rinse 5 milliLiter(s) Swish and Spit five times a day  cefepime   IVPB 2000 milliGRAM(s) IV Intermittent daily  cefepime   IVPB      citric acid/sodium citrate Solution 30 milliLiter(s) Oral every 12 hours  fluconAZOLE   Tablet 200 milliGRAM(s) Oral daily  folic acid 1 milliGRAM(s) Oral daily  levETIRAcetam 500 milliGRAM(s) Oral two times a day  multivitamin 1 Tablet(s) Oral daily  mycophenolate mofetil 500 milliGRAM(s) Oral two times a day  pantoprazole    Tablet 40 milliGRAM(s) Oral before breakfast  tacrolimus 0.5 milliGRAM(s) Oral <User Schedule>  ursodiol Capsule 300 milliGRAM(s) Oral two times a day  valGANciclovir 450 milliGRAM(s) Oral two times a day  vancomycin    Solution 125 milliGRAM(s) Oral every 6 hours    MEDICATIONS  (PRN):  HYDROmorphone  Injectable 1 milliGRAM(s) IV Push every 6 hours PRN Severe Pain (7 - 10)  metoclopramide 5 milliGRAM(s) Oral Before meals and at bedtime PRN nausea  ondansetron    Tablet 8 milliGRAM(s) Oral three times a day PRN Nausea and/or Vomiting  ondansetron Injectable 8 milliGRAM(s) IV Push every 6 hours PRN Nausea and/or Vomiting  traMADol 25 milliGRAM(s) Oral every 12 hours PRN Mild Pain (1 - 3)    A/P:   59 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  Amylase and lipase WNL.  Calorie count x 72 hours, reglan IVP pre-meals.  Trial of Dilaudid q 4 hours for 24 hours.  Discontinue Cefepime and MMF    1. Infectious Disease:   atovaquone Suspension 750 milliGRAM(s) Oral two times a day  cefepime   IVPB 2000 milliGRAM(s) IV Intermittent daily  Fluconazole  200 milliGRAM(s) Oral daily  valGANciclovir 450 milliGRAM(s) Oral daily    2. VOD Prophylaxis: Actigall, Glutamine    3. GI Prophylaxis:  Protonix    4. Mouthcare - NS / NaHCO3 rinses, Mycelex, Biotene, skin care     5. GVHD prophylaxis     6. Transfuse & replete electrolytes prn     7. IV hydration, daily weights, strict I&O, prn diuresis   Weight gain, Cr improving, monitor closely     8. PO intake as tolerated, nutrition follow up as needed, MVI, folic acid   On Nepro    9. Antiemetics, anti-diarrhea medications:   Reglan, Ativan    10. OOB as tolerated, physical therapy consult if needed     11. Monitor coags / fibrinogen 2x week, vitamin K as needed     12. Monitor closely for clinical changes, monitor for fevers     13. Emotional support provided, plan of care discussed and questions addressed     14. Patient education done regarding chemotherapy prep, plan of care, restrictions and discharge planning     15. Continue regular social work input     16. check O2 sat on room air and post walk, O2 PRN    I have written the above note for Dr. Bentley who performed service with me in the room.   Antonia Camarillo, ANP-BC ([phone])    I have seen and examined patient with NP, I agree with above note as scribed.

## 2019-04-03 NOTE — PHYSICAL THERAPY INITIAL EVALUATION ADULT - PERTINENT HX OF CURRENT PROBLEM, REHAB EVAL
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

## 2019-04-03 NOTE — PHYSICAL THERAPY INITIAL EVALUATION ADULT - CRITERIA FOR SKILLED THERAPEUTIC INTERVENTIONS
risk reduction/prevention/impairments found/predicted duration of therapy intervention/therapy frequency/anticipated discharge recommendation

## 2019-04-03 NOTE — PHYSICAL THERAPY INITIAL EVALUATION ADULT - ADDITIONAL COMMENTS
Pt lives alone in an apartment with elevator access. Pt amb w/ [facility] PTA. Pt was independent with ADL's.

## 2019-04-04 ENCOUNTER — APPOINTMENT (OUTPATIENT)
Dept: HEMATOLOGY ONCOLOGY | Facility: CLINIC | Age: 59
End: 2019-04-04

## 2019-04-04 LAB
ALBUMIN SERPL ELPH-MCNC: 2.6 G/DL — LOW (ref 3.3–5)
ALP SERPL-CCNC: 239 U/L — HIGH (ref 40–120)
ALT FLD-CCNC: 43 U/L — SIGNIFICANT CHANGE UP (ref 10–45)
ANION GAP SERPL CALC-SCNC: 11 MMOL/L — SIGNIFICANT CHANGE UP (ref 5–17)
APTT BLD: 32.7 SEC — SIGNIFICANT CHANGE UP (ref 27.5–36.3)
AST SERPL-CCNC: 69 U/L — HIGH (ref 10–40)
BASOPHILS # BLD AUTO: 0.1 K/UL — SIGNIFICANT CHANGE UP (ref 0–0.2)
BILIRUB SERPL-MCNC: 0.3 MG/DL — SIGNIFICANT CHANGE UP (ref 0.2–1.2)
BUN SERPL-MCNC: 14 MG/DL — SIGNIFICANT CHANGE UP (ref 7–23)
CALCIUM SERPL-MCNC: 9 MG/DL — SIGNIFICANT CHANGE UP (ref 8.4–10.5)
CHLORIDE SERPL-SCNC: 107 MMOL/L — SIGNIFICANT CHANGE UP (ref 96–108)
CMV DNA CSF QL NAA+PROBE: SIGNIFICANT CHANGE UP
CMV DNA SPEC NAA+PROBE-LOG#: 4.37 LOGIU/ML — HIGH
CO2 SERPL-SCNC: 23 MMOL/L — SIGNIFICANT CHANGE UP (ref 22–31)
CREAT SERPL-MCNC: 0.89 MG/DL — SIGNIFICANT CHANGE UP (ref 0.5–1.3)
EOSINOPHIL # BLD AUTO: 0 K/UL — SIGNIFICANT CHANGE UP (ref 0–0.5)
EOSINOPHIL NFR BLD AUTO: 2 % — SIGNIFICANT CHANGE UP (ref 0–6)
GLUCOSE SERPL-MCNC: 131 MG/DL — HIGH (ref 70–99)
HCT VFR BLD CALC: 31.7 % — LOW (ref 34.5–45)
HGB BLD-MCNC: 10.1 G/DL — LOW (ref 11.5–15.5)
INR BLD: 1 RATIO — SIGNIFICANT CHANGE UP (ref 0.88–1.16)
LDH SERPL L TO P-CCNC: 492 U/L — HIGH (ref 50–242)
LYMPHOCYTES # BLD AUTO: 42 % — SIGNIFICANT CHANGE UP (ref 13–44)
LYMPHOCYTES # BLD AUTO: 7.8 K/UL — HIGH (ref 1–3.3)
MAGNESIUM SERPL-MCNC: 2 MG/DL — SIGNIFICANT CHANGE UP (ref 1.6–2.6)
MCHC RBC-ENTMCNC: 31.4 PG — SIGNIFICANT CHANGE UP (ref 27–34)
MCHC RBC-ENTMCNC: 31.7 GM/DL — LOW (ref 32–36)
MCV RBC AUTO: 99 FL — SIGNIFICANT CHANGE UP (ref 80–100)
MONOCYTES # BLD AUTO: SIGNIFICANT CHANGE UP (ref 0–0.9)
MONOCYTES NFR BLD AUTO: 7 % — SIGNIFICANT CHANGE UP (ref 2–14)
NEUTROPHILS # BLD AUTO: 5.1 K/UL — SIGNIFICANT CHANGE UP (ref 1.8–7.4)
NEUTROPHILS NFR BLD AUTO: 42 % — LOW (ref 43–77)
PHOSPHATE SERPL-MCNC: 2.4 MG/DL — LOW (ref 2.5–4.5)
PLATELET # BLD AUTO: 40 K/UL — LOW (ref 150–400)
POTASSIUM SERPL-MCNC: 4.8 MMOL/L — SIGNIFICANT CHANGE UP (ref 3.5–5.3)
POTASSIUM SERPL-SCNC: 4.8 MMOL/L — SIGNIFICANT CHANGE UP (ref 3.5–5.3)
PROT SERPL-MCNC: 4.8 G/DL — LOW (ref 6–8.3)
PROTHROM AB SERPL-ACNC: 11.5 SEC — SIGNIFICANT CHANGE UP (ref 10–12.9)
RBC # BLD: 3.2 M/UL — LOW (ref 3.8–5.2)
RBC # FLD: 17.5 % — HIGH (ref 10.3–14.5)
SODIUM SERPL-SCNC: 141 MMOL/L — SIGNIFICANT CHANGE UP (ref 135–145)
WBC # BLD: 13.8 K/UL — HIGH (ref 3.8–10.5)
WBC # FLD AUTO: 13.8 K/UL — HIGH (ref 3.8–10.5)

## 2019-04-04 PROCEDURE — 99291 CRITICAL CARE FIRST HOUR: CPT

## 2019-04-04 RX ORDER — POTASSIUM PHOSPHATE, MONOBASIC POTASSIUM PHOSPHATE, DIBASIC 236; 224 MG/ML; MG/ML
15 INJECTION, SOLUTION INTRAVENOUS ONCE
Qty: 0 | Refills: 0 | Status: COMPLETED | OUTPATIENT
Start: 2019-04-04 | End: 2019-04-04

## 2019-04-04 RX ADMIN — Medication 10 MILLIGRAM(S): at 14:05

## 2019-04-04 RX ADMIN — PANTOPRAZOLE SODIUM 40 MILLIGRAM(S): 20 TABLET, DELAYED RELEASE ORAL at 05:18

## 2019-04-04 RX ADMIN — LEVETIRACETAM 500 MILLIGRAM(S): 250 TABLET, FILM COATED ORAL at 05:18

## 2019-04-04 RX ADMIN — HYDROMORPHONE HYDROCHLORIDE 1 MILLIGRAM(S): 2 INJECTION INTRAMUSCULAR; INTRAVENOUS; SUBCUTANEOUS at 14:20

## 2019-04-04 RX ADMIN — URSODIOL 300 MILLIGRAM(S): 250 TABLET, FILM COATED ORAL at 05:18

## 2019-04-04 RX ADMIN — LEVETIRACETAM 500 MILLIGRAM(S): 250 TABLET, FILM COATED ORAL at 18:03

## 2019-04-04 RX ADMIN — Medication 10 MILLIGRAM(S): at 21:23

## 2019-04-04 RX ADMIN — Medication 125 MILLIGRAM(S): at 11:45

## 2019-04-04 RX ADMIN — FLUCONAZOLE 200 MILLIGRAM(S): 150 TABLET ORAL at 11:45

## 2019-04-04 RX ADMIN — Medication 10 MILLIGRAM(S): at 05:19

## 2019-04-04 RX ADMIN — HYDROMORPHONE HYDROCHLORIDE 1 MILLIGRAM(S): 2 INJECTION INTRAMUSCULAR; INTRAVENOUS; SUBCUTANEOUS at 23:28

## 2019-04-04 RX ADMIN — Medication 30 MILLILITER(S): at 05:18

## 2019-04-04 RX ADMIN — ATOVAQUONE 750 MILLIGRAM(S): 750 SUSPENSION ORAL at 18:02

## 2019-04-04 RX ADMIN — HYDROMORPHONE HYDROCHLORIDE 1 MILLIGRAM(S): 2 INJECTION INTRAMUSCULAR; INTRAVENOUS; SUBCUTANEOUS at 10:25

## 2019-04-04 RX ADMIN — HYDROMORPHONE HYDROCHLORIDE 1 MILLIGRAM(S): 2 INJECTION INTRAMUSCULAR; INTRAVENOUS; SUBCUTANEOUS at 14:05

## 2019-04-04 RX ADMIN — HYDROMORPHONE HYDROCHLORIDE 1 MILLIGRAM(S): 2 INJECTION INTRAMUSCULAR; INTRAVENOUS; SUBCUTANEOUS at 06:32

## 2019-04-04 RX ADMIN — Medication 125 MILLIGRAM(S): at 18:02

## 2019-04-04 RX ADMIN — HYDROMORPHONE HYDROCHLORIDE 1 MILLIGRAM(S): 2 INJECTION INTRAMUSCULAR; INTRAVENOUS; SUBCUTANEOUS at 18:15

## 2019-04-04 RX ADMIN — ATOVAQUONE 750 MILLIGRAM(S): 750 SUSPENSION ORAL at 05:18

## 2019-04-04 RX ADMIN — TACROLIMUS 0.5 MILLIGRAM(S): 5 CAPSULE ORAL at 21:23

## 2019-04-04 RX ADMIN — Medication 5 MILLILITER(S): at 16:47

## 2019-04-04 RX ADMIN — HYDROMORPHONE HYDROCHLORIDE 1 MILLIGRAM(S): 2 INJECTION INTRAMUSCULAR; INTRAVENOUS; SUBCUTANEOUS at 22:06

## 2019-04-04 RX ADMIN — HYDROMORPHONE HYDROCHLORIDE 1 MILLIGRAM(S): 2 INJECTION INTRAMUSCULAR; INTRAVENOUS; SUBCUTANEOUS at 02:02

## 2019-04-04 RX ADMIN — Medication 1 TABLET(S): at 11:45

## 2019-04-04 RX ADMIN — Medication 125 MILLIGRAM(S): at 23:30

## 2019-04-04 RX ADMIN — HYDROMORPHONE HYDROCHLORIDE 1 MILLIGRAM(S): 2 INJECTION INTRAMUSCULAR; INTRAVENOUS; SUBCUTANEOUS at 10:10

## 2019-04-04 RX ADMIN — Medication 5 MILLILITER(S): at 23:30

## 2019-04-04 RX ADMIN — HYDROMORPHONE HYDROCHLORIDE 1 MILLIGRAM(S): 2 INJECTION INTRAMUSCULAR; INTRAVENOUS; SUBCUTANEOUS at 18:00

## 2019-04-04 RX ADMIN — Medication 5 MILLILITER(S): at 11:46

## 2019-04-04 RX ADMIN — VALGANCICLOVIR 450 MILLIGRAM(S): 450 TABLET, FILM COATED ORAL at 11:45

## 2019-04-04 RX ADMIN — HYDROMORPHONE HYDROCHLORIDE 1 MILLIGRAM(S): 2 INJECTION INTRAMUSCULAR; INTRAVENOUS; SUBCUTANEOUS at 03:17

## 2019-04-04 RX ADMIN — Medication 5 MILLILITER(S): at 21:24

## 2019-04-04 RX ADMIN — Medication 125 MILLIGRAM(S): at 05:19

## 2019-04-04 RX ADMIN — HYDROMORPHONE HYDROCHLORIDE 1 MILLIGRAM(S): 2 INJECTION INTRAMUSCULAR; INTRAVENOUS; SUBCUTANEOUS at 07:23

## 2019-04-04 RX ADMIN — TACROLIMUS 0.5 MILLIGRAM(S): 5 CAPSULE ORAL at 10:14

## 2019-04-04 RX ADMIN — POTASSIUM PHOSPHATE, MONOBASIC POTASSIUM PHOSPHATE, DIBASIC 62.5 MILLIMOLE(S): 236; 224 INJECTION, SOLUTION INTRAVENOUS at 11:16

## 2019-04-04 RX ADMIN — Medication 5 MILLILITER(S): at 07:24

## 2019-04-04 RX ADMIN — URSODIOL 300 MILLIGRAM(S): 250 TABLET, FILM COATED ORAL at 18:03

## 2019-04-04 RX ADMIN — Medication 1 MILLIGRAM(S): at 11:45

## 2019-04-04 NOTE — PROGRESS NOTE ADULT - SUBJECTIVE AND OBJECTIVE BOX
[facility] Transplant Team                                                      Critical / Counseling Time Provided: 30 minutes                                                                                                                                                                                                                                                                                                  Chief Complaint: hypotension, elevated WBC    Patient seen and examined with [facility] Transplant Team:     + fatigue   + decreased appetite  + chronic back pain  + abdominal pain    No complaints of dizziness today     Denies mouth / tongue / throat pain,  cough, nausea, vomiting, diarrhea    Vital Signs Last 24 Hrs  T(C): 36.8 (04 Apr 2019 04:43), Max: 37.3 (03 Apr 2019 13:05)  T(F): 98.3 (04 Apr 2019 04:43), Max: 99.2 (03 Apr 2019 13:05)  HR: 101 (04 Apr 2019 04:43) (92 - 104)  BP: 107/66 (04 Apr 2019 04:43) (100/68 - 128/79)  BP(mean): --  RR: 18 (04 Apr 2019 04:43) (18 - 18)  SpO2: 95% (04 Apr 2019 04:43) (95% - 98%)    Admit weight: 104.3 kg  Daily Weight in kG:     I&O's Summary    03 Apr 2019 07:01  -  04 Apr 2019 07:00  --------------------------------------------------------  IN: 2080 mL / OUT: 400 mL / NET: 1680 mL    PE:   Oropharynx: Clear  Oral Mucositis:   clear                                                     Grade:   CVS: RRR  Lungs: CTA  Abdomen: + BS, SNT  Extremities: warm and dry no swelling  Gastric Mucositis:                                                  Grade:   Intestinal Mucositis:                                              Grade:   Skin: No rash   PIV  Neuro: Awake alert and oriented  Pain: Denies    Labs:                         9.4    7.9   )-----------( 29       ( 03 Apr 2019 13:59 )             27.3     04-03    138  |  106  |  15  ----------------------------<  131<H>  4.3   |  21<L>  |  0.88    Ca    8.5      03 Apr 2019 09:38  Phos  2.2     04-03  Mg     1.5     04-03    TPro  4.4<L>  /  Alb  2.3<L>  /  TBili  0.2  /  DBili  x   /  AST  58<H>  /  ALT  33  /  AlkPhos  207<H>  04-03    CAPILLARY BLOOD GLUCOSE    Cultures:     Culture - Blood (03.29.19 @ 21:18)    Specimen Source: .Blood Blood-Venous    Culture Results:   No growth to date.    Culture - Blood (03.29.19 @ 21:18)    Specimen Source: .Blood Blood-Peripheral    Culture Results:   No growth to date.    Radiology:     from: Xray Chest 1 View AP/PA (03.29.19 @ 16:25)     IMPRESSION: Clear lungs.    MEDICATIONS  (STANDING):  atovaquone Suspension 750 milliGRAM(s) Oral two times a day  Biotene Dry Mouth Oral Rinse 5 milliLiter(s) Swish and Spit five times a day  cefepime   IVPB 2000 milliGRAM(s) IV Intermittent daily  cefepime   IVPB      citric acid/sodium citrate Solution 30 milliLiter(s) Oral every 12 hours  fluconAZOLE   Tablet 200 milliGRAM(s) Oral daily  folic acid 1 milliGRAM(s) Oral daily  levETIRAcetam 500 milliGRAM(s) Oral two times a day  multivitamin 1 Tablet(s) Oral daily  mycophenolate mofetil 500 milliGRAM(s) Oral two times a day  pantoprazole    Tablet 40 milliGRAM(s) Oral before breakfast  tacrolimus 0.5 milliGRAM(s) Oral <User Schedule>  ursodiol Capsule 300 milliGRAM(s) Oral two times a day  valGANciclovir 450 milliGRAM(s) Oral two times a day  vancomycin    Solution 125 milliGRAM(s) Oral every 6 hours    MEDICATIONS  (PRN):  HYDROmorphone  Injectable 1 milliGRAM(s) IV Push every 6 hours PRN Severe Pain (7 - 10)  metoclopramide 5 milliGRAM(s) Oral Before meals and at bedtime PRN nausea  ondansetron    Tablet 8 milliGRAM(s) Oral three times a day PRN Nausea and/or Vomiting  ondansetron Injectable 8 milliGRAM(s) IV Push every 6 hours PRN Nausea and/or Vomiting  traMADol 25 milliGRAM(s) Oral every 12 hours PRN Mild Pain (1 - 3)    A/P:   59 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  Amylase and lipase WNL.  Calorie count x 72 hours (4/3-4/5), reglan IVP pre-meals.  Trial of Dilaudid q 4 hours for 24 hours.  Discontinued Cefepime and MMF on 4/3, continue on Tacro    1. Infectious Disease:   atovaquone Suspension 750 milliGRAM(s) Oral two times a day  cefepime   IVPB 2000 milliGRAM(s) IV Intermittent daily  Fluconazole  200 milliGRAM(s) Oral daily  valGANciclovir 450 milliGRAM(s) Oral daily    2. VOD Prophylaxis: Actigall, Glutamine    3. GI Prophylaxis:  Protonix    4. Mouthcare - NS / NaHCO3 rinses, Mycelex, Biotene, skin care     5. GVHD prophylaxis     6. Transfuse & replete electrolytes prn     7. IV hydration, daily weights, strict I&O, prn diuresis   Weight gain, Cr improving, monitor closely     8. PO intake as tolerated, nutrition follow up as needed, MVI, folic acid   On Nepro    9. Antiemetics, anti-diarrhea medications:   Reglan, Ativan    10. OOB as tolerated, physical therapy consult if needed     11. Monitor coags / fibrinogen 2x week, vitamin K as needed     12. Monitor closely for clinical changes, monitor for fevers     13. Emotional support provided, plan of care discussed and questions addressed     14. Patient education done regarding chemotherapy prep, plan of care, restrictions and discharge planning     15. Continue regular social work input     16. check O2 sat on room air and post walk, O2 PRN    I have written the above note for Dr. Bentley who performed service with me in the room.   Antonia Camarillo, ANP-BC ([phone])    I have seen and examined patient with NP, I agree with above note as scribed. [facility] Transplant Team                                                      Critical / Counseling Time Provided: 30 minutes                                                                                                                                                                                                                                                                                                  Chief Complaint: hypotension, elevated WBC    Patient seen and examined with [facility] Transplant Team:     + fatigue   + decreased appetite  + chronic back pain  + abdominal pain    No complaints of dizziness today     Denies mouth / tongue / throat pain,  cough, nausea, vomiting, diarrhea    Vital Signs Last 24 Hrs  T(C): 36.8 (2019 04:43), Max: 37.3 (2019 13:05)  T(F): 98.3 (2019 04:43), Max: 99.2 (2019 13:05)  HR: 101 (2019 04:43) (92 - 104)  BP: 107/66 (2019 04:43) (100/68 - 128/79)  BP(mean): --  RR: 18 (2019 04:43) (18 - 18)  SpO2: 95% (2019 04:43) (95% - 98%)    Admit weight: 104.3 kg  Daily Weight in k.6 kg    I&O's Summary    2019 07:01  -  2019 07:00  --------------------------------------------------------  IN: 2080 mL / OUT: 400 mL / NET: 1680 mL    PE:   Oropharynx: Clear  Oral Mucositis:   clear                                                     Grade:   CVS: RRR  Lungs: CTA  Abdomen: + BS, SNT  Extremities: warm and dry no swelling  Gastric Mucositis:                                                  Grade:   Intestinal Mucositis:                                              Grade:   Skin: No rash   PIV  Neuro: Awake alert and oriented  Pain: Denies    Labs:                         9.4    7.9   )-----------( 29       ( 2019 13:59 )             27.3     04-03    138  |  106  |  15  ----------------------------<  131<H>  4.3   |  21<L>  |  0.88    Ca    8.5      2019 09:38  Phos  2.2     04-03  Mg     1.5     04-03    TPro  4.4<L>  /  Alb  2.3<L>  /  TBili  0.2  /  DBili  x   /  AST  58<H>  /  ALT  33  /  AlkPhos  207<H>  -    CAPILLARY BLOOD GLUCOSE    Cultures:     Culture - Blood (19 @ 21:18)    Specimen Source: .Blood Blood-Venous    Culture Results:   No growth to date.    Culture - Blood (19 @ 21:18)    Specimen Source: .Blood Blood-Peripheral    Culture Results:   No growth to date.    Radiology:     from: Xray Chest 1 View AP/PA (19 @ 16:25)     IMPRESSION: Clear lungs.    MEDICATIONS  (STANDING):  atovaquone Suspension 750 milliGRAM(s) Oral two times a day  Biotene Dry Mouth Oral Rinse 5 milliLiter(s) Swish and Spit five times a day  cefepime   IVPB 2000 milliGRAM(s) IV Intermittent daily  cefepime   IVPB      citric acid/sodium citrate Solution 30 milliLiter(s) Oral every 12 hours  fluconAZOLE   Tablet 200 milliGRAM(s) Oral daily  folic acid 1 milliGRAM(s) Oral daily  levETIRAcetam 500 milliGRAM(s) Oral two times a day  multivitamin 1 Tablet(s) Oral daily  mycophenolate mofetil 500 milliGRAM(s) Oral two times a day  pantoprazole    Tablet 40 milliGRAM(s) Oral before breakfast  tacrolimus 0.5 milliGRAM(s) Oral <User Schedule>  ursodiol Capsule 300 milliGRAM(s) Oral two times a day  valGANciclovir 450 milliGRAM(s) Oral two times a day  vancomycin    Solution 125 milliGRAM(s) Oral every 6 hours    MEDICATIONS  (PRN):  HYDROmorphone  Injectable 1 milliGRAM(s) IV Push every 6 hours PRN Severe Pain (7 - 10)  metoclopramide 5 milliGRAM(s) Oral Before meals and at bedtime PRN nausea  ondansetron    Tablet 8 milliGRAM(s) Oral three times a day PRN Nausea and/or Vomiting  ondansetron Injectable 8 milliGRAM(s) IV Push every 6 hours PRN Nausea and/or Vomiting  traMADol 25 milliGRAM(s) Oral every 12 hours PRN Mild Pain (1 - 3)    A/P:   59 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  Amylase and lipase WNL.  Calorie count x 72 hours (4/3-), reglan IVP pre-meals.  Trial of Dilaudid q 4 hours for 24 hours.  Discontinued Cefepime and MMF on 4/3, continue on Tacro.  Counts are recovering.    1. Infectious Disease:   atovaquone Suspension 750 milliGRAM(s) Oral two times a day  cefepime   IVPB 2000 milliGRAM(s) IV Intermittent daily  Fluconazole  200 milliGRAM(s) Oral daily  valGANciclovir 450 milliGRAM(s) Oral daily    2. VOD Prophylaxis: Actigall, Glutamine    3. GI Prophylaxis:  Protonix    4. Mouthcare - NS / NaHCO3 rinses, Mycelex, Biotene, skin care     5. GVHD prophylaxis     6. Transfuse & replete electrolytes prn   KPhos 15 mmols IV x 1 dose    7. IV hydration, daily weights, strict I&O, prn diuresis   Weight gain, Cr improving, monitor closely     8. PO intake as tolerated, nutrition follow up as needed, MVI, folic acid   On Nepro    9. Antiemetics, anti-diarrhea medications:   Reglan, Ativan    10. OOB as tolerated, physical therapy consult if needed     11. Monitor coags / fibrinogen 2x week, vitamin K as needed     12. Monitor closely for clinical changes, monitor for fevers     13. Emotional support provided, plan of care discussed and questions addressed     14. Patient education done regarding chemotherapy prep, plan of care, restrictions and discharge planning     15. Continue regular social work input     16. check O2 sat on room air and post walk, O2 PRN    I have written the above note for Dr. Bentley who performed service with me in the room.   Antonia Camarillo, ANP-BC ([phone])    I have seen and examined patient with NP, I agree with above note as scribed. [facility] Transplant Team                                                      Critical / Counseling Time Provided: 30 minutes                                                                                                                                                                                                                                                                                                  Chief Complaint: hypotension, elevated WBC    Patient seen and examined with [facility] Transplant Team:     + fatigue   + decreased appetite  + chronic back pain  + abdominal pain    No complaints of dizziness today, had a formed stool this morning     Denies mouth / tongue / throat pain,  cough, nausea, vomiting, diarrhea    Vital Signs Last 24 Hrs  T(C): 36.8 (2019 04:43), Max: 37.3 (2019 13:05)  T(F): 98.3 (2019 04:43), Max: 99.2 (2019 13:05)  HR: 101 (2019 04:43) (92 - 104)  BP: 107/66 (2019 04:43) (100/68 - 128/79)  BP(mean): --  RR: 18 (2019 04:43) (18 - 18)  SpO2: 95% (2019 04:43) (95% - 98%)    Admit weight: 104.3 kg  Daily Weight in k.6 kg    I&O's Summary    2019 07:01  -  2019 07:00  --------------------------------------------------------  IN: 2080 mL / OUT: 400 mL / NET: 1680 mL    PE:   Oropharynx: Clear  Oral Mucositis:   clear                                                     Grade:   CVS: RRR  Lungs: CTA  Abdomen: + BS, SNT  Extremities: warm and dry no swelling  Gastric Mucositis:                                                  Grade:   Intestinal Mucositis:                                              Grade:   Skin: No rash   PIV  Neuro: Awake alert and oriented  Pain: Denies    Labs:                         9.4    7.9   )-----------( 29       ( 2019 13:59 )             27.3     04-03    138  |  106  |  15  ----------------------------<  131<H>  4.3   |  21<L>  |  0.88    Ca    8.5      2019 09:38  Phos  2.2     04-03  Mg     1.5     -    TPro  4.4<L>  /  Alb  2.3<L>  /  TBili  0.2  /  DBili  x   /  AST  58<H>  /  ALT  33  /  AlkPhos  207<H>  -    CAPILLARY BLOOD GLUCOSE    Cultures:     Culture - Blood (19 @ 21:18)    Specimen Source: .Blood Blood-Venous    Culture Results:   No growth to date.    Culture - Blood (19 @ 21:18)    Specimen Source: .Blood Blood-Peripheral    Culture Results:   No growth to date.    Radiology:     from: Xray Chest 1 View AP/PA (19 @ 16:25)     IMPRESSION: Clear lungs.    MEDICATIONS  (STANDING):  atovaquone Suspension 750 milliGRAM(s) Oral two times a day  Biotene Dry Mouth Oral Rinse 5 milliLiter(s) Swish and Spit five times a day  cefepime   IVPB 2000 milliGRAM(s) IV Intermittent daily  cefepime   IVPB      citric acid/sodium citrate Solution 30 milliLiter(s) Oral every 12 hours  fluconAZOLE   Tablet 200 milliGRAM(s) Oral daily  folic acid 1 milliGRAM(s) Oral daily  levETIRAcetam 500 milliGRAM(s) Oral two times a day  multivitamin 1 Tablet(s) Oral daily  mycophenolate mofetil 500 milliGRAM(s) Oral two times a day  pantoprazole    Tablet 40 milliGRAM(s) Oral before breakfast  tacrolimus 0.5 milliGRAM(s) Oral <User Schedule>  ursodiol Capsule 300 milliGRAM(s) Oral two times a day  valGANciclovir 450 milliGRAM(s) Oral two times a day  vancomycin    Solution 125 milliGRAM(s) Oral every 6 hours    MEDICATIONS  (PRN):  HYDROmorphone  Injectable 1 milliGRAM(s) IV Push every 6 hours PRN Severe Pain (7 - 10)  metoclopramide 5 milliGRAM(s) Oral Before meals and at bedtime PRN nausea  ondansetron    Tablet 8 milliGRAM(s) Oral three times a day PRN Nausea and/or Vomiting  ondansetron Injectable 8 milliGRAM(s) IV Push every 6 hours PRN Nausea and/or Vomiting  traMADol 25 milliGRAM(s) Oral every 12 hours PRN Mild Pain (1 - 3)    A/P:   59 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  Amylase and lipase WNL.  Calorie count x 72 hours (4/3-), reglan IVP pre-meals.  Trial of Dilaudid q 4 hours for 24 hours.  Discontinued Cefepime and MMF on 4/3, continue on Tacro.  Counts are recovering.    1. Infectious Disease:   atovaquone Suspension 750 milliGRAM(s) Oral two times a day  cefepime   IVPB 2000 milliGRAM(s) IV Intermittent daily  Fluconazole  200 milliGRAM(s) Oral daily  valGANciclovir 450 milliGRAM(s) Oral daily    2. VOD Prophylaxis: Actigall, Glutamine    3. GI Prophylaxis:  Protonix    4. Mouthcare - NS / NaHCO3 rinses, Mycelex, Biotene, skin care     5. GVHD prophylaxis     6. Transfuse & replete electrolytes prn   KPhos 15 mmols IV x 1 dose    7. IV hydration, daily weights, strict I&O, prn diuresis   Weight gain, Cr improving, monitor closely     8. PO intake as tolerated, nutrition follow up as needed, MVI, folic acid   On Nepro    9. Antiemetics, anti-diarrhea medications:   Reglan, Ativan    10. OOB as tolerated, physical therapy consult if needed     11. Monitor coags / fibrinogen 2x week, vitamin K as needed     12. Monitor closely for clinical changes, monitor for fevers     13. Emotional support provided, plan of care discussed and questions addressed     14. Patient education done regarding chemotherapy prep, plan of care, restrictions and discharge planning     15. Continue regular social work input     16. check O2 sat on room air and post walk, O2 PRN    I have written the above note for Dr. Bentley who performed service with me in the room.   Antonia Camarillo, ANP-BC ([phone])    I have seen and examined patient with NP, I agree with above note as scribed. [facility] Transplant Team                                                      Critical / Counseling Time Provided: 30 minutes                                                                                                                                                                                                                                                                                                  Chief Complaint: hypotension, elevated WBC    Patient seen and examined with [facility] Transplant Team:     + fatigue   + decreased appetite  + chronic back pain  + abdominal pain    No complaints of dizziness today, had a formed stool this morning     Denies mouth / tongue / throat pain,  cough, nausea, vomiting, diarrhea    Vital Signs Last 24 Hrs  T(C): 36.8 (2019 04:43), Max: 37.3 (2019 13:05)  T(F): 98.3 (2019 04:43), Max: 99.2 (2019 13:05)  HR: 101 (2019 04:43) (92 - 104)  BP: 107/66 (2019 04:43) (100/68 - 128/79)  BP(mean): --  RR: 18 (2019 04:43) (18 - 18)  SpO2: 95% (2019 04:43) (95% - 98%)    Admit weight: 104.3 kg  Daily Weight in k.6 kg    I&O's Summary    2019 07:01  -  2019 07:00  --------------------------------------------------------  IN: 2080 mL / OUT: 400 mL / NET: 1680 mL    PE:   Oropharynx: Clear  Oral Mucositis:   clear                                                     Grade:   CVS: RRR  Lungs: CTA  Abdomen: + BS, SNT  Gastric Mucositis:                                                  Grade:   Intestinal Mucositis:                                              Grade:   Extremities: +1 bilateral LE edema  Skin: No rash  Line: PIV  Neuro: Awake alert and oriented  Pain: Denies    Labs:                         9.4    7.9   )-----------( 29       ( 2019 13:59 )             27.3     04-03    138  |  106  |  15  ----------------------------<  131<H>  4.3   |  21<L>  |  0.88    Ca    8.5      2019 09:38  Phos  2.2     04-03  Mg     1.5     -03    TPro  4.4<L>  /  Alb  2.3<L>  /  TBili  0.2  /  DBili  x   /  AST  58<H>  /  ALT  33  /  AlkPhos  207<H>  -    CAPILLARY BLOOD GLUCOSE    Cultures:     Culture - Blood (19 @ 21:18)    Specimen Source: .Blood Blood-Venous    Culture Results:   No growth to date.    Culture - Blood (19 @ 21:18)    Specimen Source: .Blood Blood-Peripheral    Culture Results:   No growth to date.    Radiology:     from: Xray Chest 1 View AP/PA (19 @ 16:25)     IMPRESSION: Clear lungs.    MEDICATIONS  (STANDING):  atovaquone Suspension 750 milliGRAM(s) Oral two times a day  Biotene Dry Mouth Oral Rinse 5 milliLiter(s) Swish and Spit five times a day  cefepime   IVPB 2000 milliGRAM(s) IV Intermittent daily  cefepime   IVPB      citric acid/sodium citrate Solution 30 milliLiter(s) Oral every 12 hours  fluconAZOLE   Tablet 200 milliGRAM(s) Oral daily  folic acid 1 milliGRAM(s) Oral daily  levETIRAcetam 500 milliGRAM(s) Oral two times a day  multivitamin 1 Tablet(s) Oral daily  mycophenolate mofetil 500 milliGRAM(s) Oral two times a day  pantoprazole    Tablet 40 milliGRAM(s) Oral before breakfast  tacrolimus 0.5 milliGRAM(s) Oral <User Schedule>  ursodiol Capsule 300 milliGRAM(s) Oral two times a day  valGANciclovir 450 milliGRAM(s) Oral two times a day  vancomycin    Solution 125 milliGRAM(s) Oral every 6 hours    MEDICATIONS  (PRN):  HYDROmorphone  Injectable 1 milliGRAM(s) IV Push every 6 hours PRN Severe Pain (7 - 10)  metoclopramide 5 milliGRAM(s) Oral Before meals and at bedtime PRN nausea  ondansetron    Tablet 8 milliGRAM(s) Oral three times a day PRN Nausea and/or Vomiting  ondansetron Injectable 8 milliGRAM(s) IV Push every 6 hours PRN Nausea and/or Vomiting  traMADol 25 milliGRAM(s) Oral every 12 hours PRN Mild Pain (1 - 3)    A/P:   59 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  Amylase and lipase WNL.  Calorie count x 72 hours (4/3-), reglan IVP pre-meals.  Trial of Dilaudid q 4 hours for 24 hours.  Discontinued Cefepime and MMF on 4/3, continue on Tacro.  Counts are recovering.    1. Infectious Disease:   atovaquone Suspension 750 milliGRAM(s) Oral two times a day  cefepime   IVPB 2000 milliGRAM(s) IV Intermittent daily  Fluconazole  200 milliGRAM(s) Oral daily  valGANciclovir 450 milliGRAM(s) Oral daily    2. VOD Prophylaxis: Actigall, Glutamine    3. GI Prophylaxis:  Protonix    4. Mouthcare - NS / NaHCO3 rinses, Mycelex, Biotene, skin care     5. GVHD prophylaxis     6. Transfuse & replete electrolytes prn   KPhos 15 mmols IV x 1 dose    7. IV hydration, daily weights, strict I&O, prn diuresis   Weight gain, Cr improving, monitor closely     8. PO intake as tolerated, nutrition follow up as needed, MVI, folic acid   On Nepro    9. Antiemetics, anti-diarrhea medications:   Reglan, Ativan    10. OOB as tolerated, physical therapy consult if needed     11. Monitor coags / fibrinogen 2x week, vitamin K as needed     12. Monitor closely for clinical changes, monitor for fevers     13. Emotional support provided, plan of care discussed and questions addressed     14. Patient education done regarding chemotherapy prep, plan of care, restrictions and discharge planning     15. Continue regular social work input     16. check O2 sat on room air and post walk, O2 PRN    I have written the above note for Dr. Bentley who performed service with me in the room.   Antonia Camarillo, ANP-BC ([phone])    I have seen and examined patient with NP, I agree with above note as scribed. [facility] Transplant Team                                                      Critical / Counseling Time Provided: 30 minutes                                                                                                                                                                                                                                                                                                  Chief Complaint: hypotension, elevated WBC    Patient seen and examined with [facility] Transplant Team:     + fatigue   + decreased appetite  + chronic back pain  + abdominal pain    No complaints of dizziness today, had a formed stool this morning     Denies mouth / tongue / throat pain,  cough, nausea, vomiting, diarrhea    Vital Signs Last 24 Hrs  T(C): 36.8 (2019 04:43), Max: 37.3 (2019 13:05)  T(F): 98.3 (2019 04:43), Max: 99.2 (2019 13:05)  HR: 101 (2019 04:43) (92 - 104)  BP: 107/66 (2019 04:43) (100/68 - 128/79)  BP(mean): --  RR: 18 (2019 04:43) (18 - 18)  SpO2: 95% (2019 04:43) (95% - 98%)    Admit weight: 104.3 kg  Daily Weight in k.6 kg    I&O's Summary    2019 07:01  -  2019 07:00  --------------------------------------------------------  IN: 2080 mL / OUT: 400 mL / NET: 1680 mL    PE:   Oropharynx: Clear  Oral Mucositis:   clear                                                     Grade:   CVS: RRR  Lungs: CTA  Abdomen: + BS, SNT  Gastric Mucositis:                                                  Grade:   Intestinal Mucositis:                                              Grade:   Extremities: +1 bilateral LE edema  Skin: No rash  Line: PIV  Neuro: Awake alert and oriented  Pain: Denies    Labs:                         9.4    7.9   )-----------( 29       ( 2019 13:59 )             27.3     04-03    138  |  106  |  15  ----------------------------<  131<H>  4.3   |  21<L>  |  0.88    Ca    8.5      2019 09:38  Phos  2.2     04-03  Mg     1.5     -03    TPro  4.4<L>  /  Alb  2.3<L>  /  TBili  0.2  /  DBili  x   /  AST  58<H>  /  ALT  33  /  AlkPhos  207<H>  -    CAPILLARY BLOOD GLUCOSE    Cultures:     Culture - Blood (19 @ 21:18)    Specimen Source: .Blood Blood-Venous    Culture Results:   No growth to date.    Culture - Blood (19 @ 21:18)    Specimen Source: .Blood Blood-Peripheral    Culture Results:   No growth to date.    Radiology:     from: Xray Chest 1 View AP/PA (19 @ 16:25)     IMPRESSION: Clear lungs.    MEDICATIONS  (STANDING):  atovaquone Suspension 750 milliGRAM(s) Oral two times a day  Biotene Dry Mouth Oral Rinse 5 milliLiter(s) Swish and Spit five times a day  cefepime   IVPB 2000 milliGRAM(s) IV Intermittent daily  cefepime   IVPB      citric acid/sodium citrate Solution 30 milliLiter(s) Oral every 12 hours  fluconAZOLE   Tablet 200 milliGRAM(s) Oral daily  folic acid 1 milliGRAM(s) Oral daily  levETIRAcetam 500 milliGRAM(s) Oral two times a day  multivitamin 1 Tablet(s) Oral daily  mycophenolate mofetil 500 milliGRAM(s) Oral two times a day  pantoprazole    Tablet 40 milliGRAM(s) Oral before breakfast  tacrolimus 0.5 milliGRAM(s) Oral <User Schedule>  ursodiol Capsule 300 milliGRAM(s) Oral two times a day  valGANciclovir 450 milliGRAM(s) Oral two times a day  vancomycin    Solution 125 milliGRAM(s) Oral every 6 hours    MEDICATIONS  (PRN):  HYDROmorphone  Injectable 1 milliGRAM(s) IV Push every 6 hours PRN Severe Pain (7 - 10)  metoclopramide 5 milliGRAM(s) Oral Before meals and at bedtime PRN nausea  ondansetron    Tablet 8 milliGRAM(s) Oral three times a day PRN Nausea and/or Vomiting  ondansetron Injectable 8 milliGRAM(s) IV Push every 6 hours PRN Nausea and/or Vomiting  traMADol 25 milliGRAM(s) Oral every 12 hours PRN Mild Pain (1 - 3)    A/P:   59 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  Amylase and lipase WNL.  Calorie count x 72 hours (4/3-), reglan IVP pre-meals.  Trial of Dilaudid q 4 hours for 24 hours.  Discontinued Cefepime and MMF on 4/3, continue on Tacro.  Counts are recovering.    1. Infectious Disease:   atovaquone Suspension 750 milliGRAM(s) Oral two times a day  cefepime   IVPB 2000 milliGRAM(s) IV Intermittent daily..d/c  Fluconazole  200 milliGRAM(s) Oral daily  valGANciclovir 450 milliGRAM(s) Oral daily    2. VOD Prophylaxis: Actigall, Glutamine    3. GI Prophylaxis:  Protonix    4. Mouthcare - NS / NaHCO3 rinses, Mycelex, Biotene, skin care     5. GVHD prophylaxis     6. Transfuse & replete electrolytes prn   KPhos 15 mmols IV x 1 dose    7. IV hydration, daily weights, strict I&O, prn diuresis   Weight gain, Cr improving, monitor closely     8. PO intake as tolerated, nutrition follow up as needed, MVI, folic acid   On Nepro    9. Antiemetics, anti-diarrhea medications:   Reglan, Ativan    10. OOB as tolerated, physical therapy consult if needed     11. Monitor coags / fibrinogen 2x week, vitamin K as needed     12. Monitor closely for clinical changes, monitor for fevers     13. Emotional support provided, plan of care discussed and questions addressed     14. Patient education done regarding chemotherapy prep, plan of care, restrictions and discharge planning     15. Continue regular social work input     16. check O2 sat on room air and post walk, O2 PRN    I have written the above note for Dr. Bentley who performed service with me in the room.   Antonia Camarillo, ANP-BC ([phone])    I have seen and examined patient with NP, I agree with above note as scribed.

## 2019-04-04 NOTE — PROGRESS NOTE ADULT - ATTENDING COMMENTS
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  cmv pos..ct with vague findings in pancreas..amylase and lipase nl..unclear etiology of upper GI discomfort...?? related to virus   Renally adjusted; Cefepime 2G daily...will stop  Vanco 1G once, continue pending blood cultures..cx are neg  resume po vanco for cdiff....would rather taper slowly   Tacro level from 3/30 low. on  0.5 in bid..level 5....dose decreased b/c of increased creatinine  Continue MMF 1G PO BID..taper held b/c upper GI sx and rash...may be reason counts are dropping along with the valcyte...stop today...no gvhd  Most recent FISH for Y on 3/22 100% donor...repeat cmv pcr improved  manage bicarb  Continue Actigall for VOD prevention  Continue Keppra for h/o seizures  Contnue Fluconazole for antifungal ppx  Continue Mepron for PCP ppx  Continue supportive care. 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  cmv pos..ct with vague findings in pancreas..amylase and lipase nl..unclear etiology of upper GI discomfort...improved today...ate better....?? related to virus..on valcyte 450 QD..cbc improved   Renally adjusted; Cefepime 2G daily...stopped yesterday  Vanco 1G once, continue pending blood cultures..cx are neg  resume po vanco for cdiff....would rather taper slowly   Tacro level from 3/30 low. on  0.5 in bid..level 5....dose decreased b/c of increased creatinine...creatinine now better  Continue MMF 1G PO BID..taper held b/c upper GI sx and rash...may be reason counts are dropping along with the valcyte...stop as of today...no gvhd  Most recent FISH for Y on 3/22 100% donor...repeat cmv pcr improved  manage bicarb  Continue Actigall for VOD prevention  Continue Keppra for h/o seizures  Contnue Fluconazole for antifungal ppx  Continue Mepron for PCP ppx  Continue supportive care.. ??d/c next week

## 2019-04-05 ENCOUNTER — APPOINTMENT (OUTPATIENT)
Dept: RADIOLOGY | Facility: IMAGING CENTER | Age: 59
End: 2019-04-05

## 2019-04-05 LAB
ALBUMIN SERPL ELPH-MCNC: 2.7 G/DL — LOW (ref 3.3–5)
ALP SERPL-CCNC: 244 U/L — HIGH (ref 40–120)
ALT FLD-CCNC: 51 U/L — HIGH (ref 10–45)
ANION GAP SERPL CALC-SCNC: 10 MMOL/L — SIGNIFICANT CHANGE UP (ref 5–17)
AST SERPL-CCNC: 79 U/L — HIGH (ref 10–40)
BASOPHILS # BLD AUTO: 0.1 K/UL — SIGNIFICANT CHANGE UP (ref 0–0.2)
BILIRUB SERPL-MCNC: 0.3 MG/DL — SIGNIFICANT CHANGE UP (ref 0.2–1.2)
BLD GP AB SCN SERPL QL: NEGATIVE — SIGNIFICANT CHANGE UP
BUN SERPL-MCNC: 15 MG/DL — SIGNIFICANT CHANGE UP (ref 7–23)
CALCIUM SERPL-MCNC: 9.1 MG/DL — SIGNIFICANT CHANGE UP (ref 8.4–10.5)
CHLORIDE SERPL-SCNC: 105 MMOL/L — SIGNIFICANT CHANGE UP (ref 96–108)
CO2 SERPL-SCNC: 23 MMOL/L — SIGNIFICANT CHANGE UP (ref 22–31)
CREAT SERPL-MCNC: 0.99 MG/DL — SIGNIFICANT CHANGE UP (ref 0.5–1.3)
EOSINOPHIL # BLD AUTO: 0.1 K/UL — SIGNIFICANT CHANGE UP (ref 0–0.5)
GLUCOSE SERPL-MCNC: 107 MG/DL — HIGH (ref 70–99)
HCT VFR BLD CALC: 29.5 % — LOW (ref 34.5–45)
HGB BLD-MCNC: 10 G/DL — LOW (ref 11.5–15.5)
LDH SERPL L TO P-CCNC: 515 U/L — HIGH (ref 50–242)
LYMPHOCYTES # BLD AUTO: 27 % — SIGNIFICANT CHANGE UP (ref 13–44)
LYMPHOCYTES # BLD AUTO: 7 K/UL — HIGH (ref 1–3.3)
MAGNESIUM SERPL-MCNC: 1.7 MG/DL — SIGNIFICANT CHANGE UP (ref 1.6–2.6)
MCHC RBC-ENTMCNC: 33.3 PG — SIGNIFICANT CHANGE UP (ref 27–34)
MCHC RBC-ENTMCNC: 33.7 GM/DL — SIGNIFICANT CHANGE UP (ref 32–36)
MCV RBC AUTO: 98.6 FL — SIGNIFICANT CHANGE UP (ref 80–100)
MONOCYTES # BLD AUTO: 0.7 K/UL — SIGNIFICANT CHANGE UP (ref 0–0.9)
MONOCYTES NFR BLD AUTO: 11 % — SIGNIFICANT CHANGE UP (ref 2–14)
NEUTROPHILS # BLD AUTO: 4.2 K/UL — SIGNIFICANT CHANGE UP (ref 1.8–7.4)
NEUTROPHILS NFR BLD AUTO: 38 % — LOW (ref 43–77)
PHOSPHATE SERPL-MCNC: 2.8 MG/DL — SIGNIFICANT CHANGE UP (ref 2.5–4.5)
PLATELET # BLD AUTO: 36 K/UL — LOW (ref 150–400)
POTASSIUM SERPL-MCNC: 4.8 MMOL/L — SIGNIFICANT CHANGE UP (ref 3.5–5.3)
POTASSIUM SERPL-SCNC: 4.8 MMOL/L — SIGNIFICANT CHANGE UP (ref 3.5–5.3)
PROT SERPL-MCNC: 4.8 G/DL — LOW (ref 6–8.3)
RBC # BLD: 2.99 M/UL — LOW (ref 3.8–5.2)
RBC # FLD: 17.5 % — HIGH (ref 10.3–14.5)
RH IG SCN BLD-IMP: POSITIVE — SIGNIFICANT CHANGE UP
SODIUM SERPL-SCNC: 138 MMOL/L — SIGNIFICANT CHANGE UP (ref 135–145)
TACROLIMUS SERPL-MCNC: 2.7 NG/ML — SIGNIFICANT CHANGE UP
WBC # BLD: 12 K/UL — HIGH (ref 3.8–10.5)
WBC # FLD AUTO: 12 K/UL — HIGH (ref 3.8–10.5)

## 2019-04-05 PROCEDURE — 99291 CRITICAL CARE FIRST HOUR: CPT

## 2019-04-05 PROCEDURE — 85060 BLOOD SMEAR INTERPRETATION: CPT

## 2019-04-05 RX ORDER — TRAMADOL HYDROCHLORIDE 50 MG/1
25 TABLET ORAL EVERY 12 HOURS
Qty: 0 | Refills: 0 | Status: DISCONTINUED | OUTPATIENT
Start: 2019-04-05 | End: 2019-04-10

## 2019-04-05 RX ORDER — TACROLIMUS 5 MG/1
1 CAPSULE ORAL
Qty: 0 | Refills: 0 | Status: DISCONTINUED | OUTPATIENT
Start: 2019-04-05 | End: 2019-04-17

## 2019-04-05 RX ORDER — VALGANCICLOVIR 450 MG/1
450 TABLET, FILM COATED ORAL
Qty: 0 | Refills: 0 | Status: DISCONTINUED | OUTPATIENT
Start: 2019-04-05 | End: 2019-04-19

## 2019-04-05 RX ORDER — POTASSIUM PHOSPHATE, MONOBASIC POTASSIUM PHOSPHATE, DIBASIC 236; 224 MG/ML; MG/ML
15 INJECTION, SOLUTION INTRAVENOUS ONCE
Qty: 0 | Refills: 0 | Status: DISCONTINUED | OUTPATIENT
Start: 2019-04-05 | End: 2019-04-05

## 2019-04-05 RX ADMIN — HYDROMORPHONE HYDROCHLORIDE 1 MILLIGRAM(S): 2 INJECTION INTRAMUSCULAR; INTRAVENOUS; SUBCUTANEOUS at 14:08

## 2019-04-05 RX ADMIN — Medication 125 MILLIGRAM(S): at 18:07

## 2019-04-05 RX ADMIN — VALGANCICLOVIR 450 MILLIGRAM(S): 450 TABLET, FILM COATED ORAL at 22:03

## 2019-04-05 RX ADMIN — PANTOPRAZOLE SODIUM 40 MILLIGRAM(S): 20 TABLET, DELAYED RELEASE ORAL at 05:29

## 2019-04-05 RX ADMIN — URSODIOL 300 MILLIGRAM(S): 250 TABLET, FILM COATED ORAL at 18:07

## 2019-04-05 RX ADMIN — Medication 10 MILLIGRAM(S): at 14:37

## 2019-04-05 RX ADMIN — HYDROMORPHONE HYDROCHLORIDE 1 MILLIGRAM(S): 2 INJECTION INTRAMUSCULAR; INTRAVENOUS; SUBCUTANEOUS at 06:36

## 2019-04-05 RX ADMIN — Medication 125 MILLIGRAM(S): at 11:53

## 2019-04-05 RX ADMIN — HYDROMORPHONE HYDROCHLORIDE 1 MILLIGRAM(S): 2 INJECTION INTRAMUSCULAR; INTRAVENOUS; SUBCUTANEOUS at 22:03

## 2019-04-05 RX ADMIN — Medication 5 MILLILITER(S): at 21:09

## 2019-04-05 RX ADMIN — HYDROMORPHONE HYDROCHLORIDE 1 MILLIGRAM(S): 2 INJECTION INTRAMUSCULAR; INTRAVENOUS; SUBCUTANEOUS at 06:53

## 2019-04-05 RX ADMIN — HYDROMORPHONE HYDROCHLORIDE 1 MILLIGRAM(S): 2 INJECTION INTRAMUSCULAR; INTRAVENOUS; SUBCUTANEOUS at 23:00

## 2019-04-05 RX ADMIN — Medication 5 MILLILITER(S): at 08:10

## 2019-04-05 RX ADMIN — Medication 125 MILLIGRAM(S): at 05:29

## 2019-04-05 RX ADMIN — TACROLIMUS 1 MILLIGRAM(S): 5 CAPSULE ORAL at 21:10

## 2019-04-05 RX ADMIN — HYDROMORPHONE HYDROCHLORIDE 1 MILLIGRAM(S): 2 INJECTION INTRAMUSCULAR; INTRAVENOUS; SUBCUTANEOUS at 03:16

## 2019-04-05 RX ADMIN — Medication 125 MILLIGRAM(S): at 23:32

## 2019-04-05 RX ADMIN — HYDROMORPHONE HYDROCHLORIDE 1 MILLIGRAM(S): 2 INJECTION INTRAMUSCULAR; INTRAVENOUS; SUBCUTANEOUS at 14:25

## 2019-04-05 RX ADMIN — Medication 10 MILLIGRAM(S): at 05:29

## 2019-04-05 RX ADMIN — ATOVAQUONE 750 MILLIGRAM(S): 750 SUSPENSION ORAL at 05:29

## 2019-04-05 RX ADMIN — FLUCONAZOLE 200 MILLIGRAM(S): 150 TABLET ORAL at 11:52

## 2019-04-05 RX ADMIN — VALGANCICLOVIR 450 MILLIGRAM(S): 450 TABLET, FILM COATED ORAL at 12:00

## 2019-04-05 RX ADMIN — HYDROMORPHONE HYDROCHLORIDE 1 MILLIGRAM(S): 2 INJECTION INTRAMUSCULAR; INTRAVENOUS; SUBCUTANEOUS at 18:20

## 2019-04-05 RX ADMIN — Medication 5 MILLILITER(S): at 16:35

## 2019-04-05 RX ADMIN — Medication 1 TABLET(S): at 11:53

## 2019-04-05 RX ADMIN — URSODIOL 300 MILLIGRAM(S): 250 TABLET, FILM COATED ORAL at 05:29

## 2019-04-05 RX ADMIN — Medication 5 MILLILITER(S): at 11:52

## 2019-04-05 RX ADMIN — HYDROMORPHONE HYDROCHLORIDE 1 MILLIGRAM(S): 2 INJECTION INTRAMUSCULAR; INTRAVENOUS; SUBCUTANEOUS at 10:09

## 2019-04-05 RX ADMIN — HYDROMORPHONE HYDROCHLORIDE 1 MILLIGRAM(S): 2 INJECTION INTRAMUSCULAR; INTRAVENOUS; SUBCUTANEOUS at 10:25

## 2019-04-05 RX ADMIN — Medication 5 MILLILITER(S): at 23:32

## 2019-04-05 RX ADMIN — TACROLIMUS 0.5 MILLIGRAM(S): 5 CAPSULE ORAL at 10:09

## 2019-04-05 RX ADMIN — Medication 1 MILLIGRAM(S): at 11:53

## 2019-04-05 RX ADMIN — LEVETIRACETAM 500 MILLIGRAM(S): 250 TABLET, FILM COATED ORAL at 05:29

## 2019-04-05 RX ADMIN — HYDROMORPHONE HYDROCHLORIDE 1 MILLIGRAM(S): 2 INJECTION INTRAMUSCULAR; INTRAVENOUS; SUBCUTANEOUS at 18:02

## 2019-04-05 RX ADMIN — Medication 10 MILLIGRAM(S): at 21:11

## 2019-04-05 RX ADMIN — LEVETIRACETAM 500 MILLIGRAM(S): 250 TABLET, FILM COATED ORAL at 18:06

## 2019-04-05 RX ADMIN — ATOVAQUONE 750 MILLIGRAM(S): 750 SUSPENSION ORAL at 18:07

## 2019-04-05 RX ADMIN — HYDROMORPHONE HYDROCHLORIDE 1 MILLIGRAM(S): 2 INJECTION INTRAMUSCULAR; INTRAVENOUS; SUBCUTANEOUS at 02:18

## 2019-04-05 NOTE — PROGRESS NOTE ADULT - ATTENDING COMMENTS
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  cmv pos..ct with vague findings in pancreas..amylase and lipase nl..unclear etiology of upper GI discomfort...improved today...ate better....?? related to virus..on valcyte 450 QD..cbc improved   Renally adjusted; Cefepime 2G daily...stopped yesterday  Vanco 1G once, continue pending blood cultures..cx are neg  resume po vanco for cdiff....would rather taper slowly   Tacro level from 3/30 low. on  0.5 in bid..level 5....dose decreased b/c of increased creatinine...creatinine now better  Continue MMF 1G PO BID..taper held b/c upper GI sx and rash...may be reason counts are dropping along with the valcyte...stop as of today...no gvhd  Most recent FISH for Y on 3/22 100% donor...repeat cmv pcr improved  manage bicarb  Continue Actigall for VOD prevention  Continue Keppra for h/o seizures  Contnue Fluconazole for antifungal ppx  Continue Mepron for PCP ppx  Continue supportive care.. 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  cmv pos..ct with vague findings in pancreas..amylase and lipase nl..unclear etiology of upper GI discomfort...improving slowly..ate better....?? related to virus..on valcyte 450 QD..cbc improved   Renally adjusted; Cefepime 2G daily...stopped   Vanco 1G once, .cx are neg  resume po vanco for cdiff....would rather taper slowly   Tacro level from 3/30 low. on  0.5 in bid..level 207....dose decreased b/c of increased creatinine...creatinine now better..level pending from today...may increase to 1 mg bid  was on MMF 1G PO BID..taper held b/c upper GI sx and rash...may be reason counts are dropping along with the valcyte...stop as 4/4..no gvhd  Most recent FISH for Y on 3/22 100% donor...repeat cmv pcr improved  manage bicarb  Continue Actigall for VOD prevention  Continue Keppra for h/o seizures  Contnue Fluconazole for antifungal ppx  Continue Mepron for PCP ppx  Continue supportive care.. 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  cmv pos..ct with vague findings in pancreas..amylase and lipase nl..unclear etiology of upper GI discomfort...improving slowly..ate better....?? related to virus..on valcyte 450 QD..cbc improved   Renally adjusted; Cefepime 2G daily...stopped   Vanco 1G once, .cx are neg  resume po vanco for cdiff....would rather taper slowly   Tacro level from 3/30 low. on  0.5 in bid..level 207....dose decreased b/c of increased creatinine...creatinine now better..level pending from today...may increase to 1 mg bid  was on MMF 1G PO BID..taper held b/c upper GI sx and rash...may be reason counts are dropping along with the valcyte...stop as 4/4..no gvhd  Most recent FISH for Y on 3/22 100% donor...repeat cmv pcr improved initially but now slightly more elevated..increase valcyte to bid  manage bicarb  Continue Actigall for VOD prevention  Continue Keppra for h/o seizures  Contnue Fluconazole for antifungal ppx  Continue Mepron for PCP ppx  Continue supportive care.. ??d/c next week  -dilaudid for pain control....will decrease to q 6 hr on monday

## 2019-04-05 NOTE — PROGRESS NOTE ADULT - SUBJECTIVE AND OBJECTIVE BOX
\[facility]\"" Transplant Team                                                      Critical / Counseling Time Provided: 30 minutes                                                                                                                                                        Chief Complaint: hypotension, elevated WBC    Patient seen and examined with \[facility]\"" Transplant Team:     + fatigue   + decreased appetite  + chronic back pain  + abdominal pain    No complaints of dizziness today, had a formed stool this morning     Denies mouth / tongue / throat pain,  cough, nausea, vomiting, diarrhea    O: Vitals:   Vital Signs Last 24 Hrs  T(C): 36.8 (2019 05:29), Max: 37.3 (2019 13:50)  T(F): 98.3 (2019 05:29), Max: 99.1 (2019 13:50)  HR: 88 (2019 05:29) (87 - 119)  BP: 122/76 (2019 05:29) (101/61 - 124/71)  RR: 18 (2019 05:29) (18 - 18)  SpO2: 96% (2019 05:29) (96% - 99%)    Admit weight: 104.3 kg  Daily Weight in k.6 (2019 09:30)    Intake / Output:    @ 07:01  -  -05 @ 07:00  --------------------------------------------------------  IN: 1005 mL / OUT: 851 mL / NET: 154 mL      PE:   Oropharynx: Clear  Oral Mucositis:   clear                                                     Grade:   CVS: RRR  Lungs: CTA  Abdomen: + BS, SNT  Gastric Mucositis:                                                  Grade:   Intestinal Mucositis:                                              Grade:   Extremities: +1 bilateral LE edema  Skin: No rash  Line: PIV  Neuro: Awake alert and oriented  Pain: Denies      Labs:         Cultures:         Radiology:       Meds:   Antimicrobials:   atovaquone Suspension 750 milliGRAM(s) Oral two times a day  fluconAZOLE   Tablet 200 milliGRAM(s) Oral daily  valGANciclovir 450 milliGRAM(s) Oral daily  vancomycin    Solution 125 milliGRAM(s) Oral every 6 hours      GI:  pantoprazole    Tablet 40 milliGRAM(s) Oral before breakfast  ursodiol Capsule 300 milliGRAM(s) Oral two times a day      Cardiovascular:       Immunologic:   tacrolimus 0.5 milliGRAM(s) Oral <User Schedule>      Other medications:   Biotene Dry Mouth Oral Rinse 5 milliLiter(s) Swish and Spit five times a day  folic acid 1 milliGRAM(s) Oral daily  levETIRAcetam 500 milliGRAM(s) Oral two times a day  metoclopramide Injectable 10 milliGRAM(s) IV Push three times a day  multivitamin 1 Tablet(s) Oral daily      PRN:   HYDROmorphone  Injectable 1 milliGRAM(s) IV Push every 4 hours PRN  ondansetron    Tablet 8 milliGRAM(s) Oral three times a day PRN  ondansetron Injectable 8 milliGRAM(s) IV Push every 6 hours PRN  traMADol 25 milliGRAM(s) Oral every 12 hours PRN      A/P:   59 year old F with a history of relapsed ALL  Status Post Allogeneic PBSCT 2019 now presents with low BP and diarrhea likely secondary to infection vs GVHD  Day +50  Last chimerism 3/22 100% donor  CMV PCR 18K on 3/30 and 12K on , NERI resolved but remains pancytopenic, keep daily dosing of Valcyte for now  Weight increase, cut back on IVF for now  Continue to hold antihypertensives  CT abdomen and pelvis with PO contrast showing ?pancreatitis.  Amylase and lipase WNL.  Calorie count x 72 hours (4/3-), reglan IVP pre-meals.  Trial of Dilaudid q 4 hours for 24 hours.  Discontinued Cefepime and MMF on 4/3, continue on Tacro.  Counts are recovering.    1. Infectious Disease:   atovaquone Suspension 750 milliGRAM(s) Oral two times a day  cefepime   IVPB 2000 milliGRAM(s) IV Intermittent daily..d/c  Fluconazole  200 milliGRAM(s) Oral daily  valGANciclovir 450 milliGRAM(s) Oral daily    2. VOD Prophylaxis: Actigall, Glutamine    3. GI Prophylaxis:  Protonix    4. Mouthcare - NS / NaHCO3 rinses, Mycelex, Biotene, skin care     5. GVHD prophylaxis     6. Transfuse & replete electrolytes prn   KPhos 15 mmols IV x 1 dose    7. IV hydration, daily weights, strict I&O, prn diuresis   Weight gain, Cr improving, monitor closely     8. PO intake as tolerated, nutrition follow up as needed, MVI, folic acid   On Nepro    9. Antiemetics, anti-diarrhea medications:   Reglan, Ativan    10. OOB as tolerated, physical therapy consult if needed     11. Monitor coags / fibrinogen 2x week, vitamin K as needed     12. Monitor closely for clinical changes, monitor for fevers     13. Emotional support provided, plan of care discussed and questions addressed     14. Patient education done regarding chemotherapy prep, plan of care, restrictions and discharge planning     15. Continue regular social work input     16. check O2 sat on room air and post walk, O2 PRN    I have written the above note for Dr. Bentley who performed service with me in the room.   Janett Quezada NP-C ([phone])    I have seen and examined patient with NP, I agree with above note as scribed. \[facility]\"" Transplant Team                                                      Critical / Counseling Time Provided: 30 minutes                                                                                                                                                        Chief Complaint: hypotension, elevated WBC    Patient seen and examined with \[facility]\"" Transplant Team:   diarrhea resolved  + chronic back pain  + abdominal pain    No complaints of dizziness today, had a formed stool this morning     Denies mouth / tongue / throat pain,  cough, nausea, vomiting, diarrhea    O: Vitals:   Vital Signs Last 24 Hrs  T(C): 36.8 (2019 05:29), Max: 37.3 (2019 13:50)  T(F): 98.3 (2019 05:29), Max: 99.1 (2019 13:50)  HR: 88 (2019 05:29) (87 - 119)  BP: 122/76 (2019 05:29) (101/61 - 124/71)  RR: 18 (2019 05:29) (18 - 18)  SpO2: 96% (2019 05:29) (96% - 99%)    Admit weight: 104.3 kg  Daily Weight in k.6 (2019 09:30)    Intake / Output:    @ 07:01  -  -05 @ 07:00  --------------------------------------------------------  IN: 1005 mL / OUT: 851 mL / NET: 154 mL      PE:   Oropharynx: Clear  Oral Mucositis:   clear                                                     Grade:   CVS: RRR  Lungs: CTA  Abdomen: + BS, SNT  Gastric Mucositis:                                                  Grade:   Intestinal Mucositis:                                              Grade:   Extremities: +1 bilateral LE edema  Skin: No rash  Line: PIV  Neuro: Awake alert and oriented  Pain: Denies      Labs:                         10.0   12.0  )-----------( 36       ( 2019 10:03 )             29.5     2019 10:03    138    |  105    |  15     ----------------------------<  107    4.8     |  23     |  0.99     Ca    9.1        2019 10:03  Phos  2.8       2019 10:03  Mg     1.7       2019 10:03    TPro  4.8    /  Alb  2.7    /  TBili  0.3    /  DBili  x      /  AST  79     /  ALT  51     /  AlkPhos  244    2019 10:03    PT/INR - ( 2019 09:04 )   PT: 11.5 sec;   INR: 1.00 ratio    PTT - ( 2019 09:04 )  PTT:32.7 sec      LIVER FUNCTIONS - ( 2019 10:03 )  Alb: 2.7 g/dL / Pro: 4.8 g/dL / ALK PHOS: 244 U/L / ALT: 51 U/L / AST: 79 U/L / GGT: x           Cultures:     Culture - Blood (19 @ 21:18)    Specimen Source: .Blood Blood-Venous    Culture Results:   No growth at 5 days.    Culture - Blood (19 @ 21:18)    Specimen Source: .Blood Blood-Peripheral    Culture Results:   No growth at 5 days.      Radiology:     from: CT Abdomen and Pelvis w/ Oral Cont (19 @ 15:14)     IMPRESSION:     Small amount of ascites.  Mild peripancreatic haziness, nonspecific. Please correlate clinically   with laboratory values as acute pancreatitis is not excluded.      Meds:   Antimicrobials:   atovaquone Suspension 750 milliGRAM(s) Oral two times a day  fluconAZOLE   Tablet 200 milliGRAM(s) Oral daily  valGANciclovir 450 milliGRAM(s) Oral daily  vancomycin    Solution 125 milliGRAM(s) Oral every 6 hours      GI:  pantoprazole    Tablet 40 milliGRAM(s) Oral before breakfast  ursodiol Capsule 300 milliGRAM(s) Oral two times a day      Immunologic:   tacrolimus 0.5 milliGRAM(s) Oral <User Schedule>      Other medications:   Biotene Dry Mouth Oral Rinse 5 milliLiter(s) Swish and Spit five times a day  folic acid 1 milliGRAM(s) Oral daily  levETIRAcetam 500 milliGRAM(s) Oral two times a day  metoclopramide Injectable 10 milliGRAM(s) IV Push three times a day  multivitamin 1 Tablet(s) Oral daily      PRN:   HYDROmorphone  Injectable 1 milliGRAM(s) IV Push every 4 hours PRN  ondansetron    Tablet 8 milliGRAM(s) Oral three times a day PRN  ondansetron Injectable 8 milliGRAM(s) IV Push every 6 hours PRN  traMADol 25 milliGRAM(s) Oral every 12 hours PRN      A/P:   59 year old F with a history of relapsed ALL  Status Post Allogeneic PBSCT 2019, Day +50,   now presents with low BP and diarrhea likely secondary to infection vs GVHD  Continue Vanco PO Q6  Last chimerism 3/22 100% donor  CMV PCR 18K on 3/30 and 12K on , NERI resolved but remains pancytopenic, increased Valcyte to BID dosing  Weight increase, cut back on IVF for now  Continue to hold antihypertensives  CT abdomen and pelvis with PO contrast showing ?pancreatitis.  Amylase and lipase WNL.  Calorie count x 72 hours (4/3-), reglan IVP pre-meals.  Discontinued Cefepime and MMF on 4/3, continue on Tacro.  Counts are recovering.   if  Level < 4, increase Tacro to 1mg BID   Follow up repeat CMV PCR  Continue Dilaudid 1mg Q4 thru weekend, plan to taper to Q6 Monday. Hopeful discharge home next week.      1. Infectious Disease:   atovaquone Suspension 750 milliGRAM(s) Oral two times a day  fluconAZOLE   Tablet 200 milliGRAM(s) Oral daily  valGANciclovir 450 milliGRAM(s) Oral daily  vancomycin    Solution 125 milliGRAM(s) Oral every 6 hours      2. VOD Prophylaxis: Actigall, Glutamine    3. GI Prophylaxis:  Protonix    4. Mouthcare - NS / NaHCO3 rinses, Mycelex, Biotene, skin care     5. GVHD prophylaxis     6. Transfuse & replete electrolytes prn   KPhos 15 mmols IV x 1 dose    7. IV hydration, daily weights, strict I&O, prn diuresis   Weight gain, Cr improving, monitor closely     8. PO intake as tolerated, nutrition follow up as needed, MVI, folic acid   On Nepro    9. Antiemetics, anti-diarrhea medications:   Reglan, Ativan    10. OOB as tolerated, physical therapy consult if needed     11. Monitor coags / fibrinogen 2x week, vitamin K as needed     12. Monitor closely for clinical changes, monitor for fevers     13. Emotional support provided, plan of care discussed and questions addressed     14. Patient education done regarding chemotherapy prep, plan of care, restrictions and discharge planning     15. Continue regular social work input     16. check O2 sat on room air and post walk, O2 PRN    I have written the above note for Dr. Bentley who performed service with me in the room.   Janett Quezada NP-C ([phone])    I have seen and examined patient with NP, I agree with above note as scribed. [facility] Transplant Team                                                      Critical / Counseling Time Provided: 30 minutes                                                                                                                                                        Chief Complaint: hypotension, elevated WBC    Patient seen and examined with [facility] Transplant Team:   diarrhea resolved  + chronic back pain  + abdominal pain    No complaints of dizziness today, had a formed stool this morning     Denies mouth / tongue / throat pain,  cough, nausea, vomiting, diarrhea    O: Vitals:   Vital Signs Last 24 Hrs  T(C): 36.8 (2019 05:29), Max: 37.3 (2019 13:50)  T(F): 98.3 (2019 05:29), Max: 99.1 (2019 13:50)  HR: 88 (2019 05:29) (87 - 119)  BP: 122/76 (2019 05:29) (101/61 - 124/71)  RR: 18 (2019 05:29) (18 - 18)  SpO2: 96% (2019 05:29) (96% - 99%)    Admit weight: 104.3 kg  Daily Weight in k.6 (2019 09:30)    Intake / Output:    @ 07:01  -  -05 @ 07:00  --------------------------------------------------------  IN: 1005 mL / OUT: 851 mL / NET: 154 mL      PE:   Oropharynx: Clear  Oral Mucositis:   clear                                                     Grade:   CVS: RRR  Lungs: CTA  Abdomen: + BS, SNT  Gastric Mucositis:                                                  Grade:   Intestinal Mucositis:                                              Grade:   Extremities: +1 bilateral LE edema  Skin: No rash  Line: PIV  Neuro: Awake alert and oriented  Pain: Denies      Labs:     Tacrolimus (), Serum (19 @ 13:55)    Tacrolimus (), Serum: 2.7:                         10.0   12.0  )-----------( 36       ( 2019 10:03 )             29.5     2019 10:03    138    |  105    |  15     ----------------------------<  107    4.8     |  23     |  0.99     Ca    9.1        2019 10:03  Phos  2.8       2019 10:03  Mg     1.7       2019 10:03    TPro  4.8    /  Alb  2.7    /  TBili  0.3    /  DBili  x      /  AST  79     /  ALT  51     /  AlkPhos  244    2019 10:03    PT/INR - ( 2019 09:04 )   PT: 11.5 sec;   INR: 1.00 ratio    PTT - ( 2019 09:04 )  PTT:32.7 sec      LIVER FUNCTIONS - ( 2019 10:03 )  Alb: 2.7 g/dL / Pro: 4.8 g/dL / ALK PHOS: 244 U/L / ALT: 51 U/L / AST: 79 U/L / GGT: x           Cultures:     Culture - Blood (19 @ 21:18)    Specimen Source: .Blood Blood-Venous    Culture Results:   No growth at 5 days.    Culture - Blood (19 @ 21:18)    Specimen Source: .Blood Blood-Peripheral    Culture Results:   No growth at 5 days.      Radiology:     from: CT Abdomen and Pelvis w/ Oral Cont (19 @ 15:14)     IMPRESSION:     Small amount of ascites.  Mild peripancreatic haziness, nonspecific. Please correlate clinically   with laboratory values as acute pancreatitis is not excluded.      Meds:   Antimicrobials:   atovaquone Suspension 750 milliGRAM(s) Oral two times a day  fluconAZOLE   Tablet 200 milliGRAM(s) Oral daily  valGANciclovir 450 milliGRAM(s) Oral daily  vancomycin    Solution 125 milliGRAM(s) Oral every 6 hours      GI:  pantoprazole    Tablet 40 milliGRAM(s) Oral before breakfast  ursodiol Capsule 300 milliGRAM(s) Oral two times a day      Immunologic:   tacrolimus 1.0 milliGRAM(s) Oral <User Schedule>      Other medications:   Biotene Dry Mouth Oral Rinse 5 milliLiter(s) Swish and Spit five times a day  folic acid 1 milliGRAM(s) Oral daily  levETIRAcetam 500 milliGRAM(s) Oral two times a day  metoclopramide Injectable 10 milliGRAM(s) IV Push three times a day  multivitamin 1 Tablet(s) Oral daily      PRN:   HYDROmorphone  Injectable 1 milliGRAM(s) IV Push every 4 hours PRN  ondansetron    Tablet 8 milliGRAM(s) Oral three times a day PRN  ondansetron Injectable 8 milliGRAM(s) IV Push every 6 hours PRN  traMADol 25 milliGRAM(s) Oral every 12 hours PRN      A/P:   59 year old F with a history of relapsed ALL  Status Post Allogeneic PBSCT 2019, Day +50,   now presents with low BP and diarrhea likely secondary to infection vs GVHD  Continue Vanco PO Q6  Last chimerism 3/22 100% donor  CMV PCR 18K on 3/30 and 12K on , NERI resolved but remains pancytopenic, increased Valcyte to BID dosing  Weight increase, cut back on IVF for now  Continue to hold antihypertensives  CT abdomen and pelvis with PO contrast showing ?pancreatitis.  Amylase and lipase WNL.  Calorie count x 72 hours (4/3-), reglan IVP pre-meals.  Discontinued Cefepime and MMF on 4/3, continue on Tacro.  Counts are recovering.   repeat  level 2.7, increasing Tacro to 1mg BID, re-check level  Follow up repeat CMV PCR  Continue Dilaudid 1mg Q4 thru weekend, plan to taper to Q6 Monday. Hopeful discharge home next week.      1. Infectious Disease:   atovaquone Suspension 750 milliGRAM(s) Oral two times a day  fluconAZOLE   Tablet 200 milliGRAM(s) Oral daily  valGANciclovir 450 milliGRAM(s) Oral daily  vancomycin    Solution 125 milliGRAM(s) Oral every 6 hours      2. VOD Prophylaxis: Actigall, Glutamine    3. GI Prophylaxis:  Protonix    4. Mouthcare - NS / NaHCO3 rinses, Mycelex, Biotene, skin care     5. GVHD prophylaxis     6. Transfuse & replete electrolytes prn   KPhos 15 mmols IV x 1 dose    7. IV hydration, daily weights, strict I&O, prn diuresis   Weight gain, Cr improving, monitor closely     8. PO intake as tolerated, nutrition follow up as needed, MVI, folic acid   On Nepro    9. Antiemetics, anti-diarrhea medications:   Reglan, Ativan    10. OOB as tolerated, physical therapy consult if needed     11. Monitor coags / fibrinogen 2x week, vitamin K as needed     12. Monitor closely for clinical changes, monitor for fevers     13. Emotional support provided, plan of care discussed and questions addressed     14. Patient education done regarding chemotherapy prep, plan of care, restrictions and discharge planning     15. Continue regular social work input     16. check O2 sat on room air and post walk, O2 PRN    I have written the above note for Dr. Bentley who performed service with me in the room.   Janett Quezada NP-C ([phone])    I have seen and examined patient with NP, I agree with above note as scribed.

## 2019-04-06 LAB
ALBUMIN SERPL ELPH-MCNC: 2.2 G/DL — LOW (ref 3.3–5)
ALP SERPL-CCNC: 245 U/L — HIGH (ref 40–120)
ALT FLD-CCNC: 52 U/L — HIGH (ref 10–45)
ANION GAP SERPL CALC-SCNC: 11 MMOL/L — SIGNIFICANT CHANGE UP (ref 5–17)
AST SERPL-CCNC: 74 U/L — HIGH (ref 10–40)
BASOPHILS # BLD AUTO: 0.1 K/UL — SIGNIFICANT CHANGE UP (ref 0–0.2)
BASOPHILS NFR BLD AUTO: 0.8 % — SIGNIFICANT CHANGE UP (ref 0–2)
BILIRUB SERPL-MCNC: 0.2 MG/DL — SIGNIFICANT CHANGE UP (ref 0.2–1.2)
BUN SERPL-MCNC: 16 MG/DL — SIGNIFICANT CHANGE UP (ref 7–23)
CALCIUM SERPL-MCNC: 9 MG/DL — SIGNIFICANT CHANGE UP (ref 8.4–10.5)
CHLORIDE SERPL-SCNC: 107 MMOL/L — SIGNIFICANT CHANGE UP (ref 96–108)
CO2 SERPL-SCNC: 19 MMOL/L — LOW (ref 22–31)
CREAT SERPL-MCNC: 0.95 MG/DL — SIGNIFICANT CHANGE UP (ref 0.5–1.3)
EOSINOPHIL # BLD AUTO: 0 K/UL — SIGNIFICANT CHANGE UP (ref 0–0.5)
EOSINOPHIL NFR BLD AUTO: 0.3 % — SIGNIFICANT CHANGE UP (ref 0–6)
GLUCOSE SERPL-MCNC: 104 MG/DL — HIGH (ref 70–99)
HCT VFR BLD CALC: 29.8 % — LOW (ref 34.5–45)
HGB BLD-MCNC: 9.4 G/DL — LOW (ref 11.5–15.5)
LDH SERPL L TO P-CCNC: 443 U/L — HIGH (ref 50–242)
LYMPHOCYTES # BLD AUTO: 4.3 K/UL — HIGH (ref 1–3.3)
LYMPHOCYTES # BLD AUTO: 51 % — HIGH (ref 13–44)
MAGNESIUM SERPL-MCNC: 1.6 MG/DL — SIGNIFICANT CHANGE UP (ref 1.6–2.6)
MCHC RBC-ENTMCNC: 31.5 GM/DL — LOW (ref 32–36)
MCHC RBC-ENTMCNC: 32.5 PG — SIGNIFICANT CHANGE UP (ref 27–34)
MCV RBC AUTO: 103 FL — HIGH (ref 80–100)
MONOCYTES # BLD AUTO: 0.7 K/UL — SIGNIFICANT CHANGE UP (ref 0–0.9)
MONOCYTES NFR BLD AUTO: 8.5 % — SIGNIFICANT CHANGE UP (ref 2–14)
NEUTROPHILS # BLD AUTO: 3.3 K/UL — SIGNIFICANT CHANGE UP (ref 1.8–7.4)
NEUTROPHILS NFR BLD AUTO: 39.3 % — LOW (ref 43–77)
PHOSPHATE SERPL-MCNC: 2.9 MG/DL — SIGNIFICANT CHANGE UP (ref 2.5–4.5)
PLATELET # BLD AUTO: 28 K/UL — LOW (ref 150–400)
POTASSIUM SERPL-MCNC: 4.7 MMOL/L — SIGNIFICANT CHANGE UP (ref 3.5–5.3)
POTASSIUM SERPL-SCNC: 4.7 MMOL/L — SIGNIFICANT CHANGE UP (ref 3.5–5.3)
PROT SERPL-MCNC: 4.4 G/DL — LOW (ref 6–8.3)
RBC # BLD: 2.89 M/UL — LOW (ref 3.8–5.2)
RBC # FLD: 18.2 % — HIGH (ref 10.3–14.5)
SODIUM SERPL-SCNC: 137 MMOL/L — SIGNIFICANT CHANGE UP (ref 135–145)
WBC # BLD: 8.5 K/UL — SIGNIFICANT CHANGE UP (ref 3.8–10.5)
WBC # FLD AUTO: 8.5 K/UL — SIGNIFICANT CHANGE UP (ref 3.8–10.5)

## 2019-04-06 PROCEDURE — 99291 CRITICAL CARE FIRST HOUR: CPT

## 2019-04-06 RX ORDER — HYDROMORPHONE HYDROCHLORIDE 2 MG/ML
1 INJECTION INTRAMUSCULAR; INTRAVENOUS; SUBCUTANEOUS EVERY 4 HOURS
Qty: 0 | Refills: 0 | Status: DISCONTINUED | OUTPATIENT
Start: 2019-04-06 | End: 2019-04-09

## 2019-04-06 RX ADMIN — Medication 1 MILLIGRAM(S): at 12:06

## 2019-04-06 RX ADMIN — URSODIOL 300 MILLIGRAM(S): 250 TABLET, FILM COATED ORAL at 18:12

## 2019-04-06 RX ADMIN — HYDROMORPHONE HYDROCHLORIDE 1 MILLIGRAM(S): 2 INJECTION INTRAMUSCULAR; INTRAVENOUS; SUBCUTANEOUS at 18:45

## 2019-04-06 RX ADMIN — TACROLIMUS 1 MILLIGRAM(S): 5 CAPSULE ORAL at 22:01

## 2019-04-06 RX ADMIN — HYDROMORPHONE HYDROCHLORIDE 1 MILLIGRAM(S): 2 INJECTION INTRAMUSCULAR; INTRAVENOUS; SUBCUTANEOUS at 07:00

## 2019-04-06 RX ADMIN — ATOVAQUONE 750 MILLIGRAM(S): 750 SUSPENSION ORAL at 05:21

## 2019-04-06 RX ADMIN — LEVETIRACETAM 500 MILLIGRAM(S): 250 TABLET, FILM COATED ORAL at 18:12

## 2019-04-06 RX ADMIN — TACROLIMUS 1 MILLIGRAM(S): 5 CAPSULE ORAL at 09:43

## 2019-04-06 RX ADMIN — Medication 125 MILLIGRAM(S): at 05:21

## 2019-04-06 RX ADMIN — Medication 5 MILLILITER(S): at 12:07

## 2019-04-06 RX ADMIN — VALGANCICLOVIR 450 MILLIGRAM(S): 450 TABLET, FILM COATED ORAL at 22:01

## 2019-04-06 RX ADMIN — LEVETIRACETAM 500 MILLIGRAM(S): 250 TABLET, FILM COATED ORAL at 05:21

## 2019-04-06 RX ADMIN — FLUCONAZOLE 200 MILLIGRAM(S): 150 TABLET ORAL at 12:06

## 2019-04-06 RX ADMIN — TRAMADOL HYDROCHLORIDE 25 MILLIGRAM(S): 50 TABLET ORAL at 12:06

## 2019-04-06 RX ADMIN — HYDROMORPHONE HYDROCHLORIDE 1 MILLIGRAM(S): 2 INJECTION INTRAMUSCULAR; INTRAVENOUS; SUBCUTANEOUS at 14:39

## 2019-04-06 RX ADMIN — ATOVAQUONE 750 MILLIGRAM(S): 750 SUSPENSION ORAL at 18:12

## 2019-04-06 RX ADMIN — URSODIOL 300 MILLIGRAM(S): 250 TABLET, FILM COATED ORAL at 05:21

## 2019-04-06 RX ADMIN — VALGANCICLOVIR 450 MILLIGRAM(S): 450 TABLET, FILM COATED ORAL at 09:43

## 2019-04-06 RX ADMIN — HYDROMORPHONE HYDROCHLORIDE 1 MILLIGRAM(S): 2 INJECTION INTRAMUSCULAR; INTRAVENOUS; SUBCUTANEOUS at 10:36

## 2019-04-06 RX ADMIN — HYDROMORPHONE HYDROCHLORIDE 1 MILLIGRAM(S): 2 INJECTION INTRAMUSCULAR; INTRAVENOUS; SUBCUTANEOUS at 02:50

## 2019-04-06 RX ADMIN — HYDROMORPHONE HYDROCHLORIDE 1 MILLIGRAM(S): 2 INJECTION INTRAMUSCULAR; INTRAVENOUS; SUBCUTANEOUS at 22:20

## 2019-04-06 RX ADMIN — HYDROMORPHONE HYDROCHLORIDE 1 MILLIGRAM(S): 2 INJECTION INTRAMUSCULAR; INTRAVENOUS; SUBCUTANEOUS at 15:15

## 2019-04-06 RX ADMIN — Medication 10 MILLIGRAM(S): at 05:21

## 2019-04-06 RX ADMIN — TRAMADOL HYDROCHLORIDE 25 MILLIGRAM(S): 50 TABLET ORAL at 13:00

## 2019-04-06 RX ADMIN — HYDROMORPHONE HYDROCHLORIDE 1 MILLIGRAM(S): 2 INJECTION INTRAMUSCULAR; INTRAVENOUS; SUBCUTANEOUS at 18:12

## 2019-04-06 RX ADMIN — Medication 1 TABLET(S): at 12:06

## 2019-04-06 RX ADMIN — Medication 125 MILLIGRAM(S): at 12:07

## 2019-04-06 RX ADMIN — Medication 5 MILLILITER(S): at 20:13

## 2019-04-06 RX ADMIN — Medication 125 MILLIGRAM(S): at 18:12

## 2019-04-06 RX ADMIN — HYDROMORPHONE HYDROCHLORIDE 1 MILLIGRAM(S): 2 INJECTION INTRAMUSCULAR; INTRAVENOUS; SUBCUTANEOUS at 06:21

## 2019-04-06 RX ADMIN — HYDROMORPHONE HYDROCHLORIDE 1 MILLIGRAM(S): 2 INJECTION INTRAMUSCULAR; INTRAVENOUS; SUBCUTANEOUS at 02:17

## 2019-04-06 RX ADMIN — HYDROMORPHONE HYDROCHLORIDE 1 MILLIGRAM(S): 2 INJECTION INTRAMUSCULAR; INTRAVENOUS; SUBCUTANEOUS at 22:00

## 2019-04-06 RX ADMIN — HYDROMORPHONE HYDROCHLORIDE 1 MILLIGRAM(S): 2 INJECTION INTRAMUSCULAR; INTRAVENOUS; SUBCUTANEOUS at 11:10

## 2019-04-06 RX ADMIN — PANTOPRAZOLE SODIUM 40 MILLIGRAM(S): 20 TABLET, DELAYED RELEASE ORAL at 05:21

## 2019-04-06 NOTE — PROGRESS NOTE ADULT - SUBJECTIVE AND OBJECTIVE BOX
HPC Transplant Team                                                      Critical / Counseling Time Provided: 30 minutes                                                                                                                                                        Chief Complaint: hypotension, elevated WBC    S: Patient seen and examined with [facility] Transplant Team:   diarrhea resolved  + chronic back pain  + abdominal pain    Denies mouth / tongue / throat pain, dyspnea, cough, nausea, vomiting, diarrhea, abdominal pain     O: Vitals:   Vital Signs Last 24 Hrs  T(C): 36.7 (2019 05:44), Max: 37.4 (2019 14:00)  T(F): 98.1 (2019 05:44), Max: 99.3 (2019 14:00)  HR: 92 (2019 05:44) (87 - 114)  BP: 104/66 (2019 05:44) (104/66 - 146/82)  BP(mean): --  RR: 18 (2019 05:44) (16 - 18)  SpO2: 98% (2019 05:44) (97% - 99%)    Admit weight: 104.3 kg   Daily Weight in k.9 (2019 08:49)  Today's weight:     Intake / Output:   04- @ 07:01  -  -06 @ 07:00  --------------------------------------------------------  IN: 1211 mL / OUT: 750 mL / NET: 461 mL        PE:   Oropharynx: Clear  Oral Mucositis:   clear                                                     Grade:   CVS: RRR  Lungs: CTA  Abdomen: + BS, SNT  Gastric Mucositis:                                                  Grade:   Intestinal Mucositis:                                              Grade:   Extremities: +1 bilateral LE edema  Skin: No rash  Line: PIV  Neuro: Awake alert and oriented  Pain: Denies       Labs:   CBC Full  -  ( 2019 10:03 )  WBC Count : 12.0 K/uL  Hemoglobin : 10.0 g/dL  Hematocrit : 29.5 %  Platelet Count - Automated : 36 K/uL  Mean Cell Volume : 98.6 fl  Mean Cell Hemoglobin : 33.3 pg  Mean Cell Hemoglobin Concentration : 33.7 gm/dL  Auto Neutrophil # : 4.2 K/uL  Auto Lymphocyte # : 7.0 K/uL  Auto Monocyte # : 0.7 K/uL  Auto Eosinophil # : 0.1 K/uL  Auto Basophil # : 0.1 K/uL  Auto Neutrophil % : 38.0 %  Auto Lymphocyte % : 27.0 %  Auto Monocyte % : 11.0 %  Auto Eosinophil % : x  Auto Basophil % : x                          10.0   12.0  )-----------( 36       ( 2019 10:03 )             29.5         138  |  105  |  15  ----------------------------<  107<H>  4.8   |  23  |  0.99    Ca    9.1      2019 10:03  Phos  2.8       Mg     1.7         TPro  4.8<L>  /  Alb  2.7<L>  /  TBili  0.3  /  DBili  x   /  AST  79<H>  /  ALT  51<H>  /  AlkPhos  244<H>      PT/INR - ( 2019 09:04 )   PT: 11.5 sec;   INR: 1.00 ratio         PTT - ( 2019 09:04 )  PTT:32.7 sec  LIVER FUNCTIONS - ( 2019 10:03 )  Alb: 2.7 g/dL / Pro: 4.8 g/dL / ALK PHOS: 244 U/L / ALT: 51 U/L / AST: 79 U/L / GGT: x           Lactate Dehydrogenase, Serum: 515 U/L ( @ 10:03)        @ 13:55  Tacrolimus 2.7                Cyclosporine --    Cultures:   Culture - Blood (19 @ 21:18)    Specimen Source: .Blood Blood-Venous    Culture Results:   No growth at 5 days.    Radiology:     Meds:   Antimicrobials:   atovaquone Suspension 750 milliGRAM(s) Oral two times a day  fluconAZOLE   Tablet 200 milliGRAM(s) Oral daily  valGANciclovir 450 milliGRAM(s) Oral two times a day  vancomycin    Solution 125 milliGRAM(s) Oral every 6 hours      Heme / Onc:       GI:  pantoprazole    Tablet 40 milliGRAM(s) Oral before breakfast  ursodiol Capsule 300 milliGRAM(s) Oral two times a day      Cardiovascular:       Immunologic:   tacrolimus 1 milliGRAM(s) Oral <User Schedule>      Other medications:   Biotene Dry Mouth Oral Rinse 5 milliLiter(s) Swish and Spit five times a day  folic acid 1 milliGRAM(s) Oral daily  levETIRAcetam 500 milliGRAM(s) Oral two times a day  metoclopramide Injectable 10 milliGRAM(s) IV Push three times a day  multivitamin 1 Tablet(s) Oral daily      PRN:   HYDROmorphone  Injectable 1 milliGRAM(s) IV Push every 4 hours PRN  ondansetron    Tablet 8 milliGRAM(s) Oral three times a day PRN  ondansetron Injectable 8 milliGRAM(s) IV Push every 6 hours PRN  traMADol 25 milliGRAM(s) Oral every 12 hours PRN      A/P:   59 year old F with a history of relapsed ALL  Status Post Allogeneic PBSCT 2019, 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  Amylase and lipase WNL.  Calorie count x 72 hours (4/3-), reglan IVP pre-meals.  Discontinued Cefepime and MMF on 4/3, continue on Tacro.  Counts are recovering.   repeat  level 2.7, increasing Tacro to 1mg BID, re-check level  Follow up repeat CMV PCR  Continue Dilaudid 1mg Q4 thru weekend, plan to taper to Q6 Monday. Hopeful discharge home next week.      1. Infectious Disease:   atovaquone Suspension 750 milliGRAM(s) Oral two times a day  fluconAZOLE   Tablet 200 milliGRAM(s) Oral daily  valGANciclovir 450 milliGRAM(s) Oral two times a day  vancomycin    Solution 125 milliGRAM(s) Oral every 6 hours    2. VOD Prophylaxis: Actigall, Glutamine    3. GI Prophylaxis:  Protonix    4. Mouthcare - NS / NaHCO3 rinses, Mycelex, Biotene, skin care     5. GVHD prophylaxis     6. Transfuse & replete electrolytes prn   KPhos 15 mmols IV x 1 dose    7. IV hydration, daily weights, strict I&O, prn diuresis   Weight gain, Cr improving, monitor closely     8. PO intake as tolerated, nutrition follow up as needed, MVI, folic acid   On Nepro    9. Antiemetics, anti-diarrhea medications:   Reglan, Ativan    10. OOB as tolerated, physical therapy consult if needed     11. Monitor coags / fibrinogen 2x week, vitamin K as needed     12. Monitor closely for clinical changes, monitor for fevers     13. Emotional support provided, plan of care discussed and questions addressed     14. Patient education done regarding chemotherapy prep, plan of care, restrictions and discharge planning     15. Continue regular social work input     16. check O2 sat on room air and post walk, O2 PRN    I have written the above note for Dr. Michael who performed service with me in the room.   Shelly Clemente NP-C ([phone])    I have seen and examined patient with NP, I agree with above note as scribed. HPC Transplant Team                                                      Critical / Counseling Time Provided: 30 minutes                                                                                                                                                        Chief Complaint: hypotension, elevated WBC    S: Patient seen and examined with [facility] Transplant Team:   diarrhea resolved  + chronic back pain  + abdominal pain    Denies mouth / tongue / throat pain, dyspnea, cough, nausea, vomiting, diarrhea    O: Vitals:   Vital Signs Last 24 Hrs  T(C): 36.7 (2019 05:44), Max: 37.4 (2019 14:00)  T(F): 98.1 (2019 05:44), Max: 99.3 (2019 14:00)  HR: 92 (2019 05:44) (87 - 114)  BP: 104/66 (2019 05:44) (104/66 - 146/82)  BP(mean): --  RR: 18 (2019 05:44) (16 - 18)  SpO2: 98% (2019 05:44) (97% - 99%)    Admit weight: 104.3 kg   Daily Weight in k.9 (2019 08:49)  Today's weight: 111.2 kg     Intake / Output:    @ 07:01  -  06 @ 07:00  --------------------------------------------------------  IN: 1211 mL / OUT: 750 mL / NET: 461 mL    PE:   Oropharynx: Clear  Oral Mucositis:   clear                                                     Grade:   CVS: RRR  Lungs: CTA  Abdomen: + BS, SNT  Gastric Mucositis:   -                                               Grade:   Intestinal Mucositis:   -                                           Grade:   Extremities: +1 bilateral LE edema  Skin: No rash  Line: PIV  Neuro: Awake alert and oriented  Pain: Denies     Labs:   CBC Full  -  ( 2019 10:03 )  WBC Count : 12.0 K/uL  Hemoglobin : 10.0 g/dL  Hematocrit : 29.5 %  Platelet Count - Automated : 36 K/uL  Mean Cell Volume : 98.6 fl  Mean Cell Hemoglobin : 33.3 pg  Mean Cell Hemoglobin Concentration : 33.7 gm/dL  Auto Neutrophil # : 4.2 K/uL  Auto Lymphocyte # : 7.0 K/uL  Auto Monocyte # : 0.7 K/uL  Auto Eosinophil # : 0.1 K/uL  Auto Basophil # : 0.1 K/uL  Auto Neutrophil % : 38.0 %  Auto Lymphocyte % : 27.0 %  Auto Monocyte % : 11.0 %  Auto Eosinophil % : x  Auto Basophil % : x                          10.0   12.0  )-----------( 36       ( 2019 10:03 )             29.5         138  |  105  |  15  ----------------------------<  107<H>  4.8   |  23  |  0.99    Ca    9.1      2019 10:03  Phos  2.8     -  Mg     1.7         TPro  4.8<L>  /  Alb  2.7<L>  /  TBili  0.3  /  DBili  x   /  AST  79<H>  /  ALT  51<H>  /  AlkPhos  244<H>      PT/INR - ( 2019 09:04 )   PT: 11.5 sec;   INR: 1.00 ratio      PTT - ( 2019 09:04 )  PTT:32.7 sec  LIVER FUNCTIONS - ( 2019 10:03 )  Alb: 2.7 g/dL / Pro: 4.8 g/dL / ALK PHOS: 244 U/L / ALT: 51 U/L / AST: 79 U/L / GGT: x           Lactate Dehydrogenase, Serum: 515 U/L ( @ 10:03)      @ 13:55  Tacrolimus 2.7                Cyclosporine --    Cultures:   Culture - Blood (19 @ 21:18)    Specimen Source: .Blood Blood-Venous    Culture Results:   No growth at 5 days.    Radiology:   < from: CT Abdomen and Pelvis w/ Oral Cont (19 @ 15:14) >  IMPRESSION:   Small amount of ascites.  Mild peripancreatic haziness, nonspecific. Please correlate clinically   with laboratory values as acute pancreatitis is not excluded.    Meds:   Antimicrobials:   atovaquone Suspension 750 milliGRAM(s) Oral two times a day  fluconAZOLE   Tablet 200 milliGRAM(s) Oral daily  valGANciclovir 450 milliGRAM(s) Oral two times a day  vancomycin    Solution 125 milliGRAM(s) Oral every 6 hours    GI:  pantoprazole    Tablet 40 milliGRAM(s) Oral before breakfast  ursodiol Capsule 300 milliGRAM(s) Oral two times a day    Immunologic:   tacrolimus 1 milliGRAM(s) Oral <User Schedule>    Other medications:   Biotene Dry Mouth Oral Rinse 5 milliLiter(s) Swish and Spit five times a day  folic acid 1 milliGRAM(s) Oral daily  levETIRAcetam 500 milliGRAM(s) Oral two times a day  metoclopramide Injectable 10 milliGRAM(s) IV Push three times a day  multivitamin 1 Tablet(s) Oral daily    PRN:   HYDROmorphone  Injectable 1 milliGRAM(s) IV Push every 4 hours PRN  ondansetron    Tablet 8 milliGRAM(s) Oral three times a day PRN  ondansetron Injectable 8 milliGRAM(s) IV Push every 6 hours PRN  traMADol 25 milliGRAM(s) Oral every 12 hours PRN    A/P:   59 year old F with a history of relapsed ALL  Status Post Allogeneic PBSCT 2019, 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 Amylase and lipase WNL.  Calorie count x 72 hours (4/3-), reglan IVP pre-meals  Discontinued Cefepime and MMF on 4/3, continue on Tacro.  Counts are recovering.   repeat  level 2.7, increasing Tacro to 1mg BID, re-check level  Follow up repeat CMV PCR  Continue Dilaudid 1mg Q4 thru weekend, plan to taper to Q6 Monday. Hopeful discharge home next week.    1. Infectious Disease:   atovaquone Suspension 750 milliGRAM(s) Oral two times a day  fluconAZOLE   Tablet 200 milliGRAM(s) Oral daily  valGANciclovir 450 milliGRAM(s) Oral two times a day  vancomycin    Solution 125 milliGRAM(s) Oral every 6 hours    2. VOD Prophylaxis: Actigall, Glutamine    3. GI Prophylaxis:  Protonix    4. Mouthcare - NS / NaHCO3 rinses, Mycelex, Biotene, skin care     5. GVHD prophylaxis     6. Transfuse & replete electrolytes prn     7. IV hydration, daily weights, strict I&O, prn diuresis     8. PO intake as tolerated, nutrition follow up as needed, MVI, folic acid   On Nepro    9. Antiemetics, anti-diarrhea medications:   Reglan, Ativan    10. OOB as tolerated, physical therapy consult if needed     11. Monitor coags / fibrinogen 2x week, vitamin K as needed     12. Monitor closely for clinical changes, monitor for fevers     13. Emotional support provided, plan of care discussed and questions addressed     14. Patient education done regarding chemotherapy prep, plan of care, restrictions and discharge planning     15. Continue regular social work input     16. check O2 sat on room air and post walk, O2 PRN    I have written the above note for Dr. Michael who performed service with me in the room.   Shelly Clemente NP-C ([phone])    I have seen and examined patient with NP, I agree with above note as scribed.

## 2019-04-06 NOTE — PROGRESS NOTE ADULT - ATTENDING COMMENTS
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  cmv pos..ct with vague findings in pancreas..amylase and lipase nl..unclear etiology of upper GI discomfort...improving slowly..ate better....?? related to virus..on valcyte 450 QD..cbc improved   Renally adjusted; Cefepime 2G daily...stopped   Vanco 1G once, .cx are neg  resume po vanco for cdiff....would rather taper slowly   Tacro level from 3/30 low. on  0.5 in bid..level 207....dose decreased b/c of increased creatinine...creatinine now better..level pending from today...may increase to 1 mg bid  was on MMF 1G PO BID..taper held b/c upper GI sx and rash...may be reason counts are dropping along with the valcyte...stop as 4/4..no gvhd  Most recent FISH for Y on 3/22 100% donor...repeat cmv pcr improved initially but now slightly more elevated..increase valcyte to bid  manage bicarb  Continue Actigall for VOD prevention  Continue Keppra for h/o seizures  Contnue Fluconazole for antifungal ppx  Continue Mepron for PCP ppx  Continue supportive care.. 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  cmv pos..ct with vague findings in pancreas..amylase and lipase nl..unclear etiology of upper GI discomfort...improving slowly..ate better....?? related to virus..on valcyte 450 QD..cbc improved  non-neutropenic on px abx  on empiric po vanco for cdiff....being tapered slowly   Tacro level 2.71 on 4/5, repeat level on 4/8  cont tacro 1 mg bid  was on MMF 1G PO BID....stopped on 4/4...no gvhd  Most recent FISH for Y on 3/22 100% donor...repeat cmv pcr planned for M/W/F of next week  cont valcyte 450 bid  Continue Actigall for VOD prevention  Continue Keppra for h/o seizures  Contnue Fluconazole for antifungal ppx  Continue Mepron for PCP ppx  Continue supportive care.. possible d/c next week  -dilaudid for pain control....will decrease to q 6 hr on monday

## 2019-04-07 LAB
ALBUMIN SERPL ELPH-MCNC: 2.4 G/DL — LOW (ref 3.3–5)
ALP SERPL-CCNC: 259 U/L — HIGH (ref 40–120)
ALT FLD-CCNC: 58 U/L — HIGH (ref 10–45)
ANION GAP SERPL CALC-SCNC: 10 MMOL/L — SIGNIFICANT CHANGE UP (ref 5–17)
AST SERPL-CCNC: 81 U/L — HIGH (ref 10–40)
BASOPHILS # BLD AUTO: 0.1 K/UL — SIGNIFICANT CHANGE UP (ref 0–0.2)
BASOPHILS NFR BLD AUTO: 0.7 % — SIGNIFICANT CHANGE UP (ref 0–2)
BILIRUB SERPL-MCNC: 0.3 MG/DL — SIGNIFICANT CHANGE UP (ref 0.2–1.2)
BUN SERPL-MCNC: 15 MG/DL — SIGNIFICANT CHANGE UP (ref 7–23)
CALCIUM SERPL-MCNC: 9.4 MG/DL — SIGNIFICANT CHANGE UP (ref 8.4–10.5)
CHLORIDE SERPL-SCNC: 106 MMOL/L — SIGNIFICANT CHANGE UP (ref 96–108)
CO2 SERPL-SCNC: 23 MMOL/L — SIGNIFICANT CHANGE UP (ref 22–31)
CREAT SERPL-MCNC: 0.89 MG/DL — SIGNIFICANT CHANGE UP (ref 0.5–1.3)
EOSINOPHIL # BLD AUTO: 0.1 K/UL — SIGNIFICANT CHANGE UP (ref 0–0.5)
EOSINOPHIL NFR BLD AUTO: 1.5 % — SIGNIFICANT CHANGE UP (ref 0–6)
GLUCOSE SERPL-MCNC: 112 MG/DL — HIGH (ref 70–99)
HCT VFR BLD CALC: 31.7 % — LOW (ref 34.5–45)
HGB BLD-MCNC: 10.1 G/DL — LOW (ref 11.5–15.5)
LDH SERPL L TO P-CCNC: 429 U/L — HIGH (ref 50–242)
LYMPHOCYTES # BLD AUTO: 5.8 K/UL — HIGH (ref 1–3.3)
LYMPHOCYTES # BLD AUTO: 57.7 % — HIGH (ref 13–44)
MAGNESIUM SERPL-MCNC: 1.6 MG/DL — SIGNIFICANT CHANGE UP (ref 1.6–2.6)
MCHC RBC-ENTMCNC: 31.7 GM/DL — LOW (ref 32–36)
MCHC RBC-ENTMCNC: 32.6 PG — SIGNIFICANT CHANGE UP (ref 27–34)
MCV RBC AUTO: 103 FL — HIGH (ref 80–100)
MONOCYTES # BLD AUTO: 0.6 K/UL — SIGNIFICANT CHANGE UP (ref 0–0.9)
MONOCYTES NFR BLD AUTO: 5.8 % — SIGNIFICANT CHANGE UP (ref 2–14)
NEUTROPHILS # BLD AUTO: 3.4 K/UL — SIGNIFICANT CHANGE UP (ref 1.8–7.4)
NEUTROPHILS NFR BLD AUTO: 34.3 % — LOW (ref 43–77)
PHOSPHATE SERPL-MCNC: 3.2 MG/DL — SIGNIFICANT CHANGE UP (ref 2.5–4.5)
PLATELET # BLD AUTO: 29 K/UL — LOW (ref 150–400)
POTASSIUM SERPL-MCNC: 4.6 MMOL/L — SIGNIFICANT CHANGE UP (ref 3.5–5.3)
POTASSIUM SERPL-SCNC: 4.6 MMOL/L — SIGNIFICANT CHANGE UP (ref 3.5–5.3)
PROT SERPL-MCNC: 4.5 G/DL — LOW (ref 6–8.3)
RBC # BLD: 3.09 M/UL — LOW (ref 3.8–5.2)
RBC # FLD: 18.2 % — HIGH (ref 10.3–14.5)
SODIUM SERPL-SCNC: 139 MMOL/L — SIGNIFICANT CHANGE UP (ref 135–145)
WBC # BLD: 10.1 K/UL — SIGNIFICANT CHANGE UP (ref 3.8–10.5)
WBC # FLD AUTO: 10.1 K/UL — SIGNIFICANT CHANGE UP (ref 3.8–10.5)

## 2019-04-07 PROCEDURE — 99291 CRITICAL CARE FIRST HOUR: CPT

## 2019-04-07 RX ORDER — FUROSEMIDE 40 MG
20 TABLET ORAL ONCE
Qty: 0 | Refills: 0 | Status: COMPLETED | OUTPATIENT
Start: 2019-04-07 | End: 2019-04-07

## 2019-04-07 RX ADMIN — Medication 5 MILLILITER(S): at 23:17

## 2019-04-07 RX ADMIN — HYDROMORPHONE HYDROCHLORIDE 1 MILLIGRAM(S): 2 INJECTION INTRAMUSCULAR; INTRAVENOUS; SUBCUTANEOUS at 14:01

## 2019-04-07 RX ADMIN — HYDROMORPHONE HYDROCHLORIDE 1 MILLIGRAM(S): 2 INJECTION INTRAMUSCULAR; INTRAVENOUS; SUBCUTANEOUS at 22:20

## 2019-04-07 RX ADMIN — HYDROMORPHONE HYDROCHLORIDE 1 MILLIGRAM(S): 2 INJECTION INTRAMUSCULAR; INTRAVENOUS; SUBCUTANEOUS at 10:04

## 2019-04-07 RX ADMIN — HYDROMORPHONE HYDROCHLORIDE 1 MILLIGRAM(S): 2 INJECTION INTRAMUSCULAR; INTRAVENOUS; SUBCUTANEOUS at 18:35

## 2019-04-07 RX ADMIN — Medication 125 MILLIGRAM(S): at 06:04

## 2019-04-07 RX ADMIN — VALGANCICLOVIR 450 MILLIGRAM(S): 450 TABLET, FILM COATED ORAL at 10:06

## 2019-04-07 RX ADMIN — HYDROMORPHONE HYDROCHLORIDE 1 MILLIGRAM(S): 2 INJECTION INTRAMUSCULAR; INTRAVENOUS; SUBCUTANEOUS at 14:18

## 2019-04-07 RX ADMIN — HYDROMORPHONE HYDROCHLORIDE 1 MILLIGRAM(S): 2 INJECTION INTRAMUSCULAR; INTRAVENOUS; SUBCUTANEOUS at 02:15

## 2019-04-07 RX ADMIN — VALGANCICLOVIR 450 MILLIGRAM(S): 450 TABLET, FILM COATED ORAL at 21:59

## 2019-04-07 RX ADMIN — HYDROMORPHONE HYDROCHLORIDE 1 MILLIGRAM(S): 2 INJECTION INTRAMUSCULAR; INTRAVENOUS; SUBCUTANEOUS at 10:20

## 2019-04-07 RX ADMIN — ATOVAQUONE 750 MILLIGRAM(S): 750 SUSPENSION ORAL at 18:37

## 2019-04-07 RX ADMIN — TACROLIMUS 1 MILLIGRAM(S): 5 CAPSULE ORAL at 10:06

## 2019-04-07 RX ADMIN — Medication 5 MILLILITER(S): at 11:33

## 2019-04-07 RX ADMIN — Medication 5 MILLILITER(S): at 20:26

## 2019-04-07 RX ADMIN — HYDROMORPHONE HYDROCHLORIDE 1 MILLIGRAM(S): 2 INJECTION INTRAMUSCULAR; INTRAVENOUS; SUBCUTANEOUS at 02:00

## 2019-04-07 RX ADMIN — HYDROMORPHONE HYDROCHLORIDE 1 MILLIGRAM(S): 2 INJECTION INTRAMUSCULAR; INTRAVENOUS; SUBCUTANEOUS at 18:12

## 2019-04-07 RX ADMIN — Medication 125 MILLIGRAM(S): at 11:33

## 2019-04-07 RX ADMIN — Medication 20 MILLIGRAM(S): at 11:32

## 2019-04-07 RX ADMIN — URSODIOL 300 MILLIGRAM(S): 250 TABLET, FILM COATED ORAL at 18:37

## 2019-04-07 RX ADMIN — URSODIOL 300 MILLIGRAM(S): 250 TABLET, FILM COATED ORAL at 06:04

## 2019-04-07 RX ADMIN — Medication 1 MILLIGRAM(S): at 11:32

## 2019-04-07 RX ADMIN — Medication 125 MILLIGRAM(S): at 18:37

## 2019-04-07 RX ADMIN — Medication 125 MILLIGRAM(S): at 00:01

## 2019-04-07 RX ADMIN — PANTOPRAZOLE SODIUM 40 MILLIGRAM(S): 20 TABLET, DELAYED RELEASE ORAL at 06:04

## 2019-04-07 RX ADMIN — LEVETIRACETAM 500 MILLIGRAM(S): 250 TABLET, FILM COATED ORAL at 18:37

## 2019-04-07 RX ADMIN — TACROLIMUS 1 MILLIGRAM(S): 5 CAPSULE ORAL at 21:59

## 2019-04-07 RX ADMIN — FLUCONAZOLE 200 MILLIGRAM(S): 150 TABLET ORAL at 11:32

## 2019-04-07 RX ADMIN — ATOVAQUONE 750 MILLIGRAM(S): 750 SUSPENSION ORAL at 06:04

## 2019-04-07 RX ADMIN — LEVETIRACETAM 500 MILLIGRAM(S): 250 TABLET, FILM COATED ORAL at 06:04

## 2019-04-07 RX ADMIN — Medication 125 MILLIGRAM(S): at 23:18

## 2019-04-07 RX ADMIN — HYDROMORPHONE HYDROCHLORIDE 1 MILLIGRAM(S): 2 INJECTION INTRAMUSCULAR; INTRAVENOUS; SUBCUTANEOUS at 22:00

## 2019-04-07 RX ADMIN — Medication 1 TABLET(S): at 11:32

## 2019-04-07 RX ADMIN — Medication 5 MILLILITER(S): at 00:01

## 2019-04-07 RX ADMIN — HYDROMORPHONE HYDROCHLORIDE 1 MILLIGRAM(S): 2 INJECTION INTRAMUSCULAR; INTRAVENOUS; SUBCUTANEOUS at 06:00

## 2019-04-07 NOTE — PROGRESS NOTE ADULT - ATTENDING COMMENTS
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  cmv pos..ct with vague findings in pancreas..amylase and lipase nl..unclear etiology of upper GI discomfort...improving slowly..ate better....?? related to virus..on valcyte 450 QD..cbc improved  non-neutropenic on px abx  on empiric po vanco for cdiff....being tapered slowly   Tacro level 2.71 on 4/5, repeat level on 4/8  cont tacro 1 mg bid  was on MMF 1G PO BID....stopped on 4/4...no gvhd  Most recent FISH for Y on 3/22 100% donor...repeat cmv pcr planned for M/W/F of next week  cont valcyte 450 bid  Continue Actigall for VOD prevention  Continue Keppra for h/o seizures  Contnue Fluconazole for antifungal ppx  Continue Mepron for PCP ppx  Continue supportive care.. 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  cmv pos..ct with vague findings in pancreas..amylase and lipase nl..unclear etiology of upper GI discomfort...improving slowly..ate better....?? related to virus..on valcyte 450 QD..cbc improved  non-neutropenic on px abx  on empiric po vanco for cdiff....being tapered slowly   Tacro level 2.71 on 4/5, repeat level on 4/8  cont tacro 1 mg bid  was on MMF 1G PO BID....stopped on 4/4...no gvhd  Most recent FISH for Y on 3/22 100% donor...repeat cmv pcr planned for M/W/F of next week  cont valcyte 450 bid  Continue Actigall for VOD prevention  Continue Keppra for h/o seizures  Contnue Fluconazole for antifungal ppx  Continue Mepron for PCP ppx  Continue supportive care.. possible d/c next week  On dilaudid q4hr for pain control....will decrease to q 6 hr on monday

## 2019-04-07 NOTE — PROGRESS NOTE ADULT - SUBJECTIVE AND OBJECTIVE BOX
[facility] Transplant Team                                                      Critical / Counseling Time Provided: 30 minutes                                                                                                                                                        Chief Complaint: hypotension, elevated WBC    S: Patient seen and examined with [facility] Transplant Team:   diarrhea resolved  + chronic back pain  + abdominal pain    Denies mouth / tongue / throat pain, dyspnea, cough, nausea, vomiting, diarrhea, abdominal pain     O: Vitals:   Vital Signs Last 24 Hrs  T(C): 36.8 (2019 05:59), Max: 37.3 (2019 17:35)  T(F): 98.2 (2019 05:59), Max: 99.2 (2019 17:35)  HR: 91 (2019 05:59) (91 - 109)  BP: 105/68 (2019 05:59) (105/68 - 128/72)  BP(mean): --  RR: 18 (2019 05:59) (18 - 18)  SpO2: 98% (2019 05:59) (94% - 98%)    Admit weight: 104.3 kg   Daily Weight in k.2 (2019 09:10)  Today's weight:     Intake / Output:   - @ 07:01  -   @ 07:00  --------------------------------------------------------  IN: 967 mL / OUT: 950 mL / NET: 17 mL        PE:   Oropharynx: Clear  Oral Mucositis:   clear                                                     Grade:   CVS: RRR  Lungs: CTA  Abdomen: + BS, SNT  Gastric Mucositis:   -                                               Grade:   Intestinal Mucositis:   -                                           Grade:   Extremities: +1 bilateral LE edema  Skin: No rash  Line: PIV  Neuro: Awake alert and oriented  Pain: Denies      Labs:   CBC Full  -  ( 2019 15:27 )  WBC Count : 8.5 K/uL  Hemoglobin : 9.4 g/dL  Hematocrit : 29.8 %  Platelet Count - Automated : 28 K/uL  Mean Cell Volume : 103.0 fl  Mean Cell Hemoglobin : 32.5 pg  Mean Cell Hemoglobin Concentration : 31.5 gm/dL  Auto Neutrophil # : 3.3 K/uL  Auto Lymphocyte # : 4.3 K/uL  Auto Monocyte # : 0.7 K/uL  Auto Eosinophil # : 0.0 K/uL  Auto Basophil # : 0.1 K/uL  Auto Neutrophil % : 39.3 %  Auto Lymphocyte % : 51.0 %  Auto Monocyte % : 8.5 %  Auto Eosinophil % : 0.3 %  Auto Basophil % : 0.8 %                          9.4    8.5   )-----------( 28       ( 2019 15:27 )             29.8     -06    137  |  107  |  16  ----------------------------<  104<H>  4.7   |  19<L>  |  0.95    Ca    9.0      2019 15:27  Phos  2.9     -  Mg     1.6     -    TPro  4.4<L>  /  Alb  2.2<L>  /  TBili  0.2  /  DBili  x   /  AST  74<H>  /  ALT  52<H>  /  AlkPhos  245<H>        LIVER FUNCTIONS - ( 2019 15:27 )  Alb: 2.2 g/dL / Pro: 4.4 g/dL / ALK PHOS: 245 U/L / ALT: 52 U/L / AST: 74 U/L / GGT: x           Lactate Dehydrogenase, Serum: 443 U/L ( @ 15:27)    Cultures:         Radiology:     Meds:   Antimicrobials:   atovaquone Suspension 750 milliGRAM(s) Oral two times a day  fluconAZOLE   Tablet 200 milliGRAM(s) Oral daily  valGANciclovir 450 milliGRAM(s) Oral two times a day  vancomycin    Solution 125 milliGRAM(s) Oral every 6 hours      Heme / Onc:       GI:  pantoprazole    Tablet 40 milliGRAM(s) Oral before breakfast  ursodiol Capsule 300 milliGRAM(s) Oral two times a day      Cardiovascular:       Immunologic:   tacrolimus 1 milliGRAM(s) Oral <User Schedule>      Other medications:   Biotene Dry Mouth Oral Rinse 5 milliLiter(s) Swish and Spit five times a day  folic acid 1 milliGRAM(s) Oral daily  HYDROmorphone  Injectable 1 milliGRAM(s) IV Push every 4 hours  levETIRAcetam 500 milliGRAM(s) Oral two times a day  metoclopramide Injectable 10 milliGRAM(s) IV Push three times a day  multivitamin 1 Tablet(s) Oral daily      PRN:   ondansetron    Tablet 8 milliGRAM(s) Oral three times a day PRN  ondansetron Injectable 8 milliGRAM(s) IV Push every 6 hours PRN  traMADol 25 milliGRAM(s) Oral every 12 hours PRN      A/P:   59 year old F with a history of relapsed ALL  Status Post Allogeneic PBSCT 2019, 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 Amylase and lipase WNL.  Calorie count x 72 hours (4/3-), reglan IVP pre-meals  Discontinued Cefepime and MMF on 4/3, continue on Tacro.  Counts are recovering.   repeat  level 2.7, increased Tacro to 1mg BID, re-check level  Follow up repeat CMV PCR - pending   Continue Dilaudid 1mg Q4 thru weekend, plan to taper to Q6 Monday. Hopeful discharge home next week.    1. Infectious Disease:   atovaquone Suspension 750 milliGRAM(s) Oral two times a day  fluconAZOLE   Tablet 200 milliGRAM(s) Oral daily  valGANciclovir 450 milliGRAM(s) Oral two times a day  vancomycin    Solution 125 milliGRAM(s) Oral every 6 hours    2. VOD Prophylaxis: Actigall, Glutamine    3. GI Prophylaxis:  Protonix    4. Mouthcare - NS / NaHCO3 rinses, Mycelex, Biotene, skin care     5. GVHD prophylaxis     6. Transfuse & replete electrolytes prn     7. IV hydration, daily weights, strict I&O, prn diuresis     8. PO intake as tolerated, nutrition follow up as needed, MVI, folic acid   On Nepro    9. Antiemetics, anti-diarrhea medications:   Reglan, Ativan    10. OOB as tolerated, physical therapy consult if needed     11. Monitor coags / fibrinogen 2x week, vitamin K as needed     12. Monitor closely for clinical changes, monitor for fevers     13. Emotional support provided, plan of care discussed and questions addressed     14. Patient education done regarding chemotherapy prep, plan of care, restrictions and discharge planning     15. Continue regular social work input     I have written the above note for Dr. Michael who performed service with me in the room.   Shelly Clemente NP-C ([phone])    I have seen and examined patient with NP, I agree with above note as scribed. [facility] Transplant Team                                                      Critical / Counseling Time Provided: 30 minutes                                                                                                                                                        Chief Complaint: hypotension, elevated WBC    S: Patient seen and examined with [facility] Transplant Team:   + chronic back pain  + abdominal pain - better today     Denies mouth / tongue / throat pain, dyspnea, cough, nausea, vomiting, diarrhea      O: Vitals:   Vital Signs Last 24 Hrs  T(C): 36.8 (2019 05:59), Max: 37.3 (2019 17:35)  T(F): 98.2 (2019 05:59), Max: 99.2 (2019 17:35)  HR: 91 (2019 05:59) (91 - 109)  BP: 105/68 (2019 05:59) (105/68 - 128/72)  BP(mean): --  RR: 18 (2019 05:59) (18 - 18)  SpO2: 98% (2019 05:59) (94% - 98%)    Admit weight: 104.3 kg   Daily Weight in k.2 (2019 09:10)  Today's weight: 110.5 kg    Intake / Output:    @ 07:01  -   @ 07:00  --------------------------------------------------------  IN: 967 mL / OUT: 950 mL / NET: 17 mL    PE:   Oropharynx: Clear  Oral Mucositis:   clear                                                     Grade:   CVS: RRR  Lungs: CTA  Abdomen: + BS, SNT  Gastric Mucositis:   -                                               Grade:   Intestinal Mucositis:   -                                           Grade:   Extremities: +1 bilateral LE edema  Skin: No rash  Line: PIV  Neuro: Awake alert and oriented  Pain: Denies    Labs:   CBC Full  -  ( 2019 15:27 )  WBC Count : 8.5 K/uL  Hemoglobin : 9.4 g/dL  Hematocrit : 29.8 %  Platelet Count - Automated : 28 K/uL  Mean Cell Volume : 103.0 fl  Mean Cell Hemoglobin : 32.5 pg  Mean Cell Hemoglobin Concentration : 31.5 gm/dL  Auto Neutrophil # : 3.3 K/uL  Auto Lymphocyte # : 4.3 K/uL  Auto Monocyte # : 0.7 K/uL  Auto Eosinophil # : 0.0 K/uL  Auto Basophil # : 0.1 K/uL  Auto Neutrophil % : 39.3 %  Auto Lymphocyte % : 51.0 %  Auto Monocyte % : 8.5 %  Auto Eosinophil % : 0.3 %  Auto Basophil % : 0.8 %                          9.4    8.5   )-----------( 28       ( 2019 15:27 )             29.8     04-06  137  |  107  |  16  ----------------------------<  104<H>  4.7   |  19<L>  |  0.95    Ca    9.0      2019 15:27  Phos  2.9     -  Mg     1.6     -    TPro  4.4<L>  /  Alb  2.2<L>  /  TBili  0.2  /  DBili  x   /  AST  74<H>  /  ALT  52<H>  /  AlkPhos  245<H>      LIVER FUNCTIONS - ( 2019 15:27 )  Alb: 2.2 g/dL / Pro: 4.4 g/dL / ALK PHOS: 245 U/L / ALT: 52 U/L / AST: 74 U/L / GGT: x           Lactate Dehydrogenase, Serum: 443 U/L ( @ 15:27)    Cultures:   Culture - Blood (19 @ 21:18)    Specimen Source: .Blood Blood-Venous    Culture Results:   No growth at 5 days.    Radiology:   < from: CT Abdomen and Pelvis w/ Oral Cont (19 @ 15:14) >  IMPRESSION:   Small amount of ascites.  Mild peripancreatic haziness, nonspecific. Please correlate clinically   with laboratory values as acute pancreatitis is not excluded.    Meds:   Antimicrobials:   atovaquone Suspension 750 milliGRAM(s) Oral two times a day  fluconAZOLE   Tablet 200 milliGRAM(s) Oral daily  valGANciclovir 450 milliGRAM(s) Oral two times a day  vancomycin    Solution 125 milliGRAM(s) Oral every 6 hours    GI:  pantoprazole    Tablet 40 milliGRAM(s) Oral before breakfast  ursodiol Capsule 300 milliGRAM(s) Oral two times a day    Immunologic:   tacrolimus 1 milliGRAM(s) Oral <User Schedule>    Other medications:   Biotene Dry Mouth Oral Rinse 5 milliLiter(s) Swish and Spit five times a day  folic acid 1 milliGRAM(s) Oral daily  HYDROmorphone  Injectable 1 milliGRAM(s) IV Push every 4 hours  levETIRAcetam 500 milliGRAM(s) Oral two times a day  metoclopramide Injectable 10 milliGRAM(s) IV Push three times a day  multivitamin 1 Tablet(s) Oral daily    PRN:   ondansetron    Tablet 8 milliGRAM(s) Oral three times a day PRN  ondansetron Injectable 8 milliGRAM(s) IV Push every 6 hours PRN  traMADol 25 milliGRAM(s) Oral every 12 hours PRN    A/P:   59 year old F with a history of relapsed ALL  Status Post Allogeneic PBSCT 2019, 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 Amylase and lipase WNL  Calorie count x 72 hours (4/3-), reglan IVP pre-meals  Discontinued Cefepime and MMF on 4/3, continue on Tacro. Counts are recovering.   repeat  level 2.7, increased Tacro to 1mg BID, re-check level  Follow up repeat CMV PCR - pending   Continue Dilaudid 1mg Q4 thru weekend, plan to taper to Q6 Monday. Hopeful discharge home this week.    1. Infectious Disease:   atovaquone Suspension 750 milliGRAM(s) Oral two times a day  fluconAZOLE   Tablet 200 milliGRAM(s) Oral daily  valGANciclovir 450 milliGRAM(s) Oral two times a day  vancomycin    Solution 125 milliGRAM(s) Oral every 6 hours    2. VOD Prophylaxis: Actigall, Glutamine    3. GI Prophylaxis:  Protonix    4. Mouthcare - NS / NaHCO3 rinses, Mycelex, Biotene, skin care     5. GVHD prophylaxis     6. Transfuse & replete electrolytes prn     7. IV hydration, daily weights, strict I&O, prn diuresis   Lasix 20 mg IV x 1     8. PO intake as tolerated, nutrition follow up as needed, MVI, folic acid   On Nepro    9. Antiemetics, anti-diarrhea medications:   Reglan, Ativan    10. OOB as tolerated, physical therapy consult if needed     11. Monitor coags / fibrinogen 2x week, vitamin K as needed     12. Monitor closely for clinical changes, monitor for fevers     13. Emotional support provided, plan of care discussed and questions addressed     14. Patient education done regarding chemotherapy prep, plan of care, restrictions and discharge planning     15. Continue regular social work input     I have written the above note for Dr. Michael who performed service with me in the room.   Shelly Clemente NP-C ([phone])    I have seen and examined patient with NP, I agree with above note as scribed.

## 2019-04-08 LAB
ALBUMIN SERPL ELPH-MCNC: 2.9 G/DL — LOW (ref 3.3–5)
ALP SERPL-CCNC: 328 U/L — HIGH (ref 40–120)
ALT FLD-CCNC: 71 U/L — HIGH (ref 10–45)
ANION GAP SERPL CALC-SCNC: 13 MMOL/L — SIGNIFICANT CHANGE UP (ref 5–17)
APTT BLD: 32.4 SEC — SIGNIFICANT CHANGE UP (ref 27.5–36.3)
AST SERPL-CCNC: 101 U/L — HIGH (ref 10–40)
BASOPHILS # BLD AUTO: 0.2 K/UL — SIGNIFICANT CHANGE UP (ref 0–0.2)
BASOPHILS NFR BLD AUTO: 0 % — SIGNIFICANT CHANGE UP (ref 0–2)
BASOPHILS NFR BLD AUTO: 0.7 % — SIGNIFICANT CHANGE UP (ref 0–2)
BILIRUB SERPL-MCNC: 0.3 MG/DL — SIGNIFICANT CHANGE UP (ref 0.2–1.2)
BLD GP AB SCN SERPL QL: NEGATIVE — SIGNIFICANT CHANGE UP
BUN SERPL-MCNC: 20 MG/DL — SIGNIFICANT CHANGE UP (ref 7–23)
CALCIUM SERPL-MCNC: 9.7 MG/DL — SIGNIFICANT CHANGE UP (ref 8.4–10.5)
CHLORIDE SERPL-SCNC: 104 MMOL/L — SIGNIFICANT CHANGE UP (ref 96–108)
CMV DNA CSF QL NAA+PROBE: SIGNIFICANT CHANGE UP
CMV DNA SPEC NAA+PROBE-LOG#: 4.28 LOGIU/ML — HIGH
CO2 SERPL-SCNC: 21 MMOL/L — LOW (ref 22–31)
CREAT SERPL-MCNC: 1.18 MG/DL — SIGNIFICANT CHANGE UP (ref 0.5–1.3)
EOSINOPHIL # BLD AUTO: 0.1 K/UL — SIGNIFICANT CHANGE UP (ref 0–0.5)
EOSINOPHIL NFR BLD AUTO: 0 % — SIGNIFICANT CHANGE UP (ref 0–6)
EOSINOPHIL NFR BLD AUTO: 0.6 % — SIGNIFICANT CHANGE UP (ref 0–6)
GLUCOSE SERPL-MCNC: 112 MG/DL — HIGH (ref 70–99)
HCT VFR BLD CALC: 35.5 % — SIGNIFICANT CHANGE UP (ref 34.5–45)
HGB BLD-MCNC: 11.4 G/DL — LOW (ref 11.5–15.5)
INR BLD: 0.95 RATIO — SIGNIFICANT CHANGE UP (ref 0.88–1.16)
LDH SERPL L TO P-CCNC: 522 U/L — HIGH (ref 50–242)
LYMPHOCYTES # BLD AUTO: 47 % — HIGH (ref 13–44)
LYMPHOCYTES # BLD AUTO: 9 K/UL — HIGH (ref 1–3.3)
MAGNESIUM SERPL-MCNC: 1.6 MG/DL — SIGNIFICANT CHANGE UP (ref 1.6–2.6)
MCHC RBC-ENTMCNC: 32.1 GM/DL — SIGNIFICANT CHANGE UP (ref 32–36)
MCHC RBC-ENTMCNC: 33.2 PG — SIGNIFICANT CHANGE UP (ref 27–34)
MCV RBC AUTO: 103 FL — HIGH (ref 80–100)
MONOCYTES # BLD AUTO: 0.9 K/UL — SIGNIFICANT CHANGE UP (ref 0–0.9)
MONOCYTES NFR BLD AUTO: 10 % — SIGNIFICANT CHANGE UP (ref 2–14)
NEUTROPHILS # BLD AUTO: 4.2 K/UL — SIGNIFICANT CHANGE UP (ref 1.8–7.4)
NEUTROPHILS NFR BLD AUTO: 35 % — LOW (ref 43–77)
PHOSPHATE SERPL-MCNC: 3.3 MG/DL — SIGNIFICANT CHANGE UP (ref 2.5–4.5)
PLATELET # BLD AUTO: 37 K/UL — LOW (ref 150–400)
POTASSIUM SERPL-MCNC: 4.5 MMOL/L — SIGNIFICANT CHANGE UP (ref 3.5–5.3)
POTASSIUM SERPL-SCNC: 4.5 MMOL/L — SIGNIFICANT CHANGE UP (ref 3.5–5.3)
PROT SERPL-MCNC: 5.1 G/DL — LOW (ref 6–8.3)
PROTHROM AB SERPL-ACNC: 10.9 SEC — SIGNIFICANT CHANGE UP (ref 10–12.9)
RBC # BLD: 3.44 M/UL — LOW (ref 3.8–5.2)
RBC # FLD: 18.2 % — HIGH (ref 10.3–14.5)
RH IG SCN BLD-IMP: POSITIVE — SIGNIFICANT CHANGE UP
SODIUM SERPL-SCNC: 138 MMOL/L — SIGNIFICANT CHANGE UP (ref 135–145)
TACROLIMUS SERPL-MCNC: 4.8 NG/ML — SIGNIFICANT CHANGE UP
WBC # BLD: 14.4 K/UL — HIGH (ref 3.8–10.5)
WBC # FLD AUTO: 14.4 K/UL — HIGH (ref 3.8–10.5)

## 2019-04-08 PROCEDURE — 99291 CRITICAL CARE FIRST HOUR: CPT

## 2019-04-08 RX ADMIN — HYDROMORPHONE HYDROCHLORIDE 1 MILLIGRAM(S): 2 INJECTION INTRAMUSCULAR; INTRAVENOUS; SUBCUTANEOUS at 10:35

## 2019-04-08 RX ADMIN — Medication 5 MILLILITER(S): at 17:14

## 2019-04-08 RX ADMIN — Medication 125 MILLIGRAM(S): at 23:21

## 2019-04-08 RX ADMIN — ATOVAQUONE 750 MILLIGRAM(S): 750 SUSPENSION ORAL at 06:05

## 2019-04-08 RX ADMIN — Medication 1 MILLIGRAM(S): at 12:17

## 2019-04-08 RX ADMIN — Medication 5 MILLILITER(S): at 23:21

## 2019-04-08 RX ADMIN — VALGANCICLOVIR 450 MILLIGRAM(S): 450 TABLET, FILM COATED ORAL at 10:19

## 2019-04-08 RX ADMIN — URSODIOL 300 MILLIGRAM(S): 250 TABLET, FILM COATED ORAL at 06:06

## 2019-04-08 RX ADMIN — LEVETIRACETAM 500 MILLIGRAM(S): 250 TABLET, FILM COATED ORAL at 06:06

## 2019-04-08 RX ADMIN — VALGANCICLOVIR 450 MILLIGRAM(S): 450 TABLET, FILM COATED ORAL at 22:05

## 2019-04-08 RX ADMIN — Medication 125 MILLIGRAM(S): at 12:18

## 2019-04-08 RX ADMIN — Medication 5 MILLILITER(S): at 12:17

## 2019-04-08 RX ADMIN — HYDROMORPHONE HYDROCHLORIDE 1 MILLIGRAM(S): 2 INJECTION INTRAMUSCULAR; INTRAVENOUS; SUBCUTANEOUS at 18:20

## 2019-04-08 RX ADMIN — HYDROMORPHONE HYDROCHLORIDE 1 MILLIGRAM(S): 2 INJECTION INTRAMUSCULAR; INTRAVENOUS; SUBCUTANEOUS at 22:04

## 2019-04-08 RX ADMIN — HYDROMORPHONE HYDROCHLORIDE 1 MILLIGRAM(S): 2 INJECTION INTRAMUSCULAR; INTRAVENOUS; SUBCUTANEOUS at 02:00

## 2019-04-08 RX ADMIN — HYDROMORPHONE HYDROCHLORIDE 1 MILLIGRAM(S): 2 INJECTION INTRAMUSCULAR; INTRAVENOUS; SUBCUTANEOUS at 14:35

## 2019-04-08 RX ADMIN — PANTOPRAZOLE SODIUM 40 MILLIGRAM(S): 20 TABLET, DELAYED RELEASE ORAL at 06:06

## 2019-04-08 RX ADMIN — HYDROMORPHONE HYDROCHLORIDE 1 MILLIGRAM(S): 2 INJECTION INTRAMUSCULAR; INTRAVENOUS; SUBCUTANEOUS at 02:17

## 2019-04-08 RX ADMIN — LEVETIRACETAM 500 MILLIGRAM(S): 250 TABLET, FILM COATED ORAL at 17:15

## 2019-04-08 RX ADMIN — HYDROMORPHONE HYDROCHLORIDE 1 MILLIGRAM(S): 2 INJECTION INTRAMUSCULAR; INTRAVENOUS; SUBCUTANEOUS at 06:00

## 2019-04-08 RX ADMIN — Medication 125 MILLIGRAM(S): at 17:15

## 2019-04-08 RX ADMIN — URSODIOL 300 MILLIGRAM(S): 250 TABLET, FILM COATED ORAL at 17:15

## 2019-04-08 RX ADMIN — Medication 5 MILLILITER(S): at 20:09

## 2019-04-08 RX ADMIN — HYDROMORPHONE HYDROCHLORIDE 1 MILLIGRAM(S): 2 INJECTION INTRAMUSCULAR; INTRAVENOUS; SUBCUTANEOUS at 22:15

## 2019-04-08 RX ADMIN — HYDROMORPHONE HYDROCHLORIDE 1 MILLIGRAM(S): 2 INJECTION INTRAMUSCULAR; INTRAVENOUS; SUBCUTANEOUS at 10:18

## 2019-04-08 RX ADMIN — Medication 1 TABLET(S): at 12:18

## 2019-04-08 RX ADMIN — ATOVAQUONE 750 MILLIGRAM(S): 750 SUSPENSION ORAL at 17:15

## 2019-04-08 RX ADMIN — HYDROMORPHONE HYDROCHLORIDE 1 MILLIGRAM(S): 2 INJECTION INTRAMUSCULAR; INTRAVENOUS; SUBCUTANEOUS at 06:20

## 2019-04-08 RX ADMIN — TACROLIMUS 1 MILLIGRAM(S): 5 CAPSULE ORAL at 10:19

## 2019-04-08 RX ADMIN — HYDROMORPHONE HYDROCHLORIDE 1 MILLIGRAM(S): 2 INJECTION INTRAMUSCULAR; INTRAVENOUS; SUBCUTANEOUS at 18:35

## 2019-04-08 RX ADMIN — HYDROMORPHONE HYDROCHLORIDE 1 MILLIGRAM(S): 2 INJECTION INTRAMUSCULAR; INTRAVENOUS; SUBCUTANEOUS at 14:17

## 2019-04-08 RX ADMIN — Medication 125 MILLIGRAM(S): at 06:05

## 2019-04-08 RX ADMIN — Medication 10 MILLIGRAM(S): at 14:17

## 2019-04-08 RX ADMIN — TACROLIMUS 1 MILLIGRAM(S): 5 CAPSULE ORAL at 22:05

## 2019-04-08 NOTE — PROGRESS NOTE ADULT - SUBJECTIVE AND OBJECTIVE BOX
[facility] Transplant Team                                                      Critical / Counseling Time Provided: 30 minutes                                                                                                                                                        Chief Complaint: hypotension, elevated WBC    S: Patient seen and examined with [facility] Transplant Team:   + chronic back pain  + abdominal pain - better today     Denies mouth / tongue / throat pain, dyspnea, cough, nausea, vomiting, diarrhea      Vital Signs Last 24 Hrs  T(C): 36.4 (08 Apr 2019 05:13), Max: 37.4 (07 Apr 2019 17:35)  T(F): 97.6 (08 Apr 2019 05:13), Max: 99.3 (07 Apr 2019 17:35)  HR: 91 (08 Apr 2019 06:00) (84 - 111)  BP: 103/66 (08 Apr 2019 06:00) (99/64 - 130/87)  BP(mean): --  RR: 18 (08 Apr 2019 05:13) (18 - 18)  SpO2: 98% (08 Apr 2019 05:13) (96% - 100%)    Admit weight: 104.3 kg   Daily Weight in kG:    I&O's Summary    07 Apr 2019 07:01  -  08 Apr 2019 07:00  --------------------------------------------------------  IN: 964 mL / OUT: 1900 mL / NET: -936 mL    PE:   Oropharynx: Clear  Oral Mucositis:   clear                                                     Grade:   CVS: RRR  Lungs: CTA  Abdomen: + BS, SNT  Gastric Mucositis:   -                                               Grade:   Intestinal Mucositis:   -                                           Grade:   Extremities: +1 bilateral LE edema  Skin: No rash  Line: PIV  Neuro: Awake alert and oriented  Pain: Denies    Labs:                   10.1   10.1  )-----------( 29       ( 07 Apr 2019 09:34 )             31.7     04-07    139  |  106  |  15  ----------------------------<  112<H>  4.6   |  23  |  0.89    Ca    9.4      07 Apr 2019 09:35  Phos  3.2     04-07  Mg     1.6     04-07    TPro  4.5<L>  /  Alb  2.4<L>  /  TBili  0.3  /  DBili  x   /  AST  81<H>  /  ALT  58<H>  /  AlkPhos  259<H>  04-07    Cultures:     Culture - Blood (03.29.19 @ 21:18)    Specimen Source: .Blood Blood-Venous    Culture Results:   No growth at 5 days.    Radiology:   < from: CT Abdomen and Pelvis w/ Oral Cont (04.02.19 @ 15:14) >  IMPRESSION:   Small amount of ascites.  Mild peripancreatic haziness, nonspecific. Please correlate clinically   with laboratory values as acute pancreatitis is not excluded.    MEDICATIONS  (STANDING):  atovaquone Suspension 750 milliGRAM(s) Oral two times a day  Biotene Dry Mouth Oral Rinse 5 milliLiter(s) Swish and Spit five times a day  fluconAZOLE   Tablet 200 milliGRAM(s) Oral daily  folic acid 1 milliGRAM(s) Oral daily  HYDROmorphone  Injectable 1 milliGRAM(s) IV Push every 4 hours  levETIRAcetam 500 milliGRAM(s) Oral two times a day  metoclopramide Injectable 10 milliGRAM(s) IV Push three times a day  multivitamin 1 Tablet(s) Oral daily  pantoprazole    Tablet 40 milliGRAM(s) Oral before breakfast  tacrolimus 1 milliGRAM(s) Oral <User Schedule>  ursodiol Capsule 300 milliGRAM(s) Oral two times a day  valGANciclovir 450 milliGRAM(s) Oral two times a day  vancomycin    Solution 125 milliGRAM(s) Oral every 6 hours    MEDICATIONS  (PRN):  ondansetron    Tablet 8 milliGRAM(s) Oral three times a day PRN Nausea and/or Vomiting  ondansetron Injectable 8 milliGRAM(s) IV Push every 6 hours PRN Nausea and/or Vomiting  traMADol 25 milliGRAM(s) Oral every 12 hours PRN Mild Pain (1 - 3)    A/P:   59 year old F with a history of relapsed ALL  Status Post Allogeneic PBSCT Feb 2019, Day +53  presents with low BP and diarrhea likely secondary to infection vs GVHD  Continue Vanco PO Q6  Last chimerism 3/22 100% donor  CMV PCR 18K on 3/30 and 12K on 4/1, NERI resolved but remains pancytopenic, increased Valcyte to BID dosing  Continue to hold antihypertensives  CT abdomen and pelvis with PO contrast showing ?pancreatitis. Amylase and lipase WNL  Calorie count x 72 hours (4/3-4/5), reglan IVP pre-meals  Discontinued Cefepime and MMF on 4/3, continue on Tacro. Counts are recovering.  4/5 repeat  level 2.7, increased Tacro to 1mg BID, re-check level Monday 4/8 4/5 Follow up repeat CMV PCR - pending   Continue Dilaudid 1mg Q4 thru weekend, plan to taper to Q6 Monday. Hopeful discharge home this week.    1. Infectious Disease:   atovaquone Suspension 750 milliGRAM(s) Oral two times a day  fluconAZOLE   Tablet 200 milliGRAM(s) Oral daily  valGANciclovir 450 milliGRAM(s) Oral two times a day  vancomycin    Solution 125 milliGRAM(s) Oral every 6 hours    2. VOD Prophylaxis: Actigall, Glutamine    3. GI Prophylaxis:  Protonix    4. Mouthcare - NS / NaHCO3 rinses, Mycelex, Biotene, skin care     5. GVHD prophylaxis     6. Transfuse & replete electrolytes prn     7. IV hydration, daily weights, strict I&O, prn diuresis     8. PO intake as tolerated, nutrition follow up as needed, MVI, folic acid   On Nepro    9. Antiemetics, anti-diarrhea medications:   Reglan, Ativan    10. OOB as tolerated, physical therapy consult if needed     11. Monitor coags / fibrinogen 2x week, vitamin K as needed     12. Monitor closely for clinical changes, monitor for fevers     13. Emotional support provided, plan of care discussed and questions addressed     14. Patient education done regarding chemotherapy prep, plan of care, restrictions and discharge planning     15. Continue regular social work input     I have written the above note for Dr. Bentley who performed service with me in the room.   Antonia Camarillo, ANP-BC ([phone])    I have seen and examined patient with NP, I agree with above note as scribed. [facility] Transplant Team                                                      Critical / Counseling Time Provided: 30 minutes                                                                                                                                                        Chief Complaint: hypotension, elevated WBC    S: Patient seen and examined with [facility] Transplant Team:   + chronic back pain  + abdominal pain stable    Denies mouth / tongue / throat pain, dyspnea, cough, nausea, vomiting, diarrhea      Vital Signs Last 24 Hrs  T(C): 36.4 (2019 05:13), Max: 37.4 (2019 17:35)  T(F): 97.6 (2019 05:13), Max: 99.3 (2019 17:35)  HR: 91 (2019 06:00) (84 - 111)  BP: 103/66 (2019 06:00) (99/64 - 130/87)  BP(mean): --  RR: 18 (2019 05:13) (18 - 18)  SpO2: 98% (2019 05:13) (96% - 100%)    Admit weight: 104.3 kg   Daily Weight in k kg    I&O's Summary    2019 07:01  -  2019 07:00  --------------------------------------------------------  IN: 964 mL / OUT: 1900 mL / NET: -936 mL    PE:   Oropharynx: Clear  Oral Mucositis:   clear                                                     Grade:   CVS: RRR  Lungs: CTA  Abdomen: + BS, soft, mild tenderness to palpation  Gastric Mucositis:   -                                               Grade:   Intestinal Mucositis:   -                                           Grade:   Extremities: +1 bilateral LE edema  Skin: No rash  Line: PIV  Neuro: Awake alert and oriented  Pain: Denies    Labs:                        11.4   14.4  )-----------( 37       ( 2019 10:12 )             35.5     04-08    138  |  104  |  20  ----------------------------<  112<H>  4.5   |  21<L>  |  1.18    Ca    9.7      2019 10:12  Phos  3.3     04-08  Mg     1.6     04-08    TPro  5.1<L>  /  Alb  2.9<L>  /  TBili  0.3  /  DBili  x   /  AST  101<H>  /  ALT  71<H>  /  AlkPhos  328<H>      PT/INR - ( 2019 10:12 )   PT: 10.9 sec;   INR: 0.95 ratio      PTT - ( 2019 13:18 )  PTT:32.4 sec    Cultures:     Culture - Blood (19 @ 21:18)    Specimen Source: .Blood Blood-Venous    Culture Results:   No growth at 5 days.    Radiology:   < from: CT Abdomen and Pelvis w/ Oral Cont (19 @ 15:14) >  IMPRESSION:   Small amount of ascites.  Mild peripancreatic haziness, nonspecific. Please correlate clinically   with laboratory values as acute pancreatitis is not excluded.    MEDICATIONS  (STANDING):  atovaquone Suspension 750 milliGRAM(s) Oral two times a day  Biotene Dry Mouth Oral Rinse 5 milliLiter(s) Swish and Spit five times a day  fluconAZOLE   Tablet 200 milliGRAM(s) Oral daily  folic acid 1 milliGRAM(s) Oral daily  HYDROmorphone  Injectable 1 milliGRAM(s) IV Push every 4 hours  levETIRAcetam 500 milliGRAM(s) Oral two times a day  metoclopramide Injectable 10 milliGRAM(s) IV Push three times a day  multivitamin 1 Tablet(s) Oral daily  pantoprazole    Tablet 40 milliGRAM(s) Oral before breakfast  tacrolimus 1 milliGRAM(s) Oral <User Schedule>  ursodiol Capsule 300 milliGRAM(s) Oral two times a day  valGANciclovir 450 milliGRAM(s) Oral two times a day  vancomycin    Solution 125 milliGRAM(s) Oral every 6 hours    MEDICATIONS  (PRN):  ondansetron    Tablet 8 milliGRAM(s) Oral three times a day PRN Nausea and/or Vomiting  ondansetron Injectable 8 milliGRAM(s) IV Push every 6 hours PRN Nausea and/or Vomiting  traMADol 25 milliGRAM(s) Oral every 12 hours PRN Mild Pain (1 - 3)    A/P:   59 year old F with a history of relapsed ALL  Status Post Allogeneic PBSCT 2019, Day +53  presents with low BP and diarrhea likely secondary to infection vs GVHD  Continue Vanco PO Q6  Last chimerism 3/22 100% donor  CMV PCR 18K on 3/30 and 12K on , NERI resolved but remains pancytopenic, increased Valcyte to BID dosing  Continue to hold antihypertensives  CT abdomen and pelvis with PO contrast showing ?pancreatitis. Amylase and lipase WNL  Calorie count x 72 hours (4/3-), reglan IVP pre-meals  Discontinued Cefepime and MMF on 4/3, continue on Tacro.    Follow up repeat CMV PCR - pending   Tacro level 4.8 today- continue on current dosing  Check FISH for Y and ferritin tomorrow with AM labs, then consider qod labs if appropriate.    Transaminitis improving, continue to monitor.  Continue Dilaudid 1mg Q4 thru today, plan to taper to Q6 tomorrow.    Tentative plan for discharge later this week, will likely need frequent platelet transfusions while on Valcyte.  Consider PICC placement prior to discharge.    1. Infectious Disease:   atovaquone Suspension 750 milliGRAM(s) Oral two times a day  fluconAZOLE   Tablet 200 milliGRAM(s) Oral daily  valGANciclovir 450 milliGRAM(s) Oral two times a day  vancomycin    Solution 125 milliGRAM(s) Oral every 6 hours    2. VOD Prophylaxis: Actigall, Glutamine    3. GI Prophylaxis:  Protonix    4. Mouthcare - NS / NaHCO3 rinses, Mycelex, Biotene, skin care     5. GVHD prophylaxis     6. Transfuse & replete electrolytes prn     7. IV hydration, daily weights, strict I&O, prn diuresis     8. PO intake as tolerated, nutrition follow up as needed, MVI, folic acid   On Nepro    9. Antiemetics, anti-diarrhea medications:   Reglan, Ativan    10. OOB as tolerated, physical therapy consult if needed     11. Monitor coags / fibrinogen 2x week, vitamin K as needed     12. Monitor closely for clinical changes, monitor for fevers     13. Emotional support provided, plan of care discussed and questions addressed     14. Patient education done regarding chemotherapy prep, plan of care, restrictions and discharge planning     15. Continue regular social work input     I have written the above note for Dr. Bentley who performed service with me in the room.   Antonia Camarillo, ANP-BC ([phone])    I have seen and examined patient with NP, I agree with above note as scribed.

## 2019-04-08 NOTE — PROGRESS NOTE ADULT - ATTENDING COMMENTS
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  cmv pos..ct with vague findings in pancreas..amylase and lipase nl..unclear etiology of upper GI discomfort...improving slowly..ate better....?? related to virus..on valcyte 450 QD..cbc improved  non-neutropenic on px abx  on empiric po vanco for cdiff....being tapered slowly   Tacro level 2.71 on 4/5, repeat level on 4/8  cont tacro 1 mg bid  was on MMF 1G PO BID....stopped on 4/4...no gvhd  Most recent FISH for Y on 3/22 100% donor...repeat cmv pcr planned for M/W/F of next week  cont valcyte 450 bid  Continue Actigall for VOD prevention  Continue Keppra for h/o seizures  Contnue Fluconazole for antifungal ppx  Continue Mepron for PCP ppx  Continue supportive care.. 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  cmv pos..ct with vague findings in pancreas..amylase and lipase nl..unclear etiology of upper GI discomfort...improving slowly..ate better....?? related to virus..on valcyte 450 bid....cbc improved  non-neutropenic on px abx  on empiric po vanco for cdiff....being tapered slowly   Tacro level 2.71 on 4/5, repeat level on 4/8  cont tacro 1 mg bid  was on MMF 1G PO BID....stopped on 4/4...no gvhd  Most recent FISH for Y on 3/22 100% donor...repeat cmv pcr planned for twice weekly  cont valcyte 450 bid  Continue Actigall for VOD prevention  Continue Keppra for h/o seizures  Contnue Fluconazole for antifungal ppx  Continue Mepron for PCP ppx  Continue supportive care.. possible d/c end of  week  On dilaudid q4hr for pain control....will decrease to q 6 hr

## 2019-04-09 LAB
ALBUMIN SERPL ELPH-MCNC: 2.5 G/DL — LOW (ref 3.3–5)
ALP SERPL-CCNC: 301 U/L — HIGH (ref 40–120)
ALT FLD-CCNC: 63 U/L — HIGH (ref 10–45)
ANION GAP SERPL CALC-SCNC: 8 MMOL/L — SIGNIFICANT CHANGE UP (ref 5–17)
AST SERPL-CCNC: 83 U/L — HIGH (ref 10–40)
BASOPHILS # BLD AUTO: 0.1 K/UL — SIGNIFICANT CHANGE UP (ref 0–0.2)
BASOPHILS NFR BLD AUTO: 1.3 % — SIGNIFICANT CHANGE UP (ref 0–2)
BILIRUB SERPL-MCNC: 0.2 MG/DL — SIGNIFICANT CHANGE UP (ref 0.2–1.2)
BUN SERPL-MCNC: 23 MG/DL — SIGNIFICANT CHANGE UP (ref 7–23)
CALCIUM SERPL-MCNC: 9.2 MG/DL — SIGNIFICANT CHANGE UP (ref 8.4–10.5)
CHLORIDE SERPL-SCNC: 106 MMOL/L — SIGNIFICANT CHANGE UP (ref 96–108)
CO2 SERPL-SCNC: 25 MMOL/L — SIGNIFICANT CHANGE UP (ref 22–31)
CREAT SERPL-MCNC: 1.05 MG/DL — SIGNIFICANT CHANGE UP (ref 0.5–1.3)
EOSINOPHIL # BLD AUTO: 0 K/UL — SIGNIFICANT CHANGE UP (ref 0–0.5)
EOSINOPHIL NFR BLD AUTO: 0.4 % — SIGNIFICANT CHANGE UP (ref 0–6)
FERRITIN SERPL-MCNC: 4057 NG/ML — HIGH (ref 15–150)
GLUCOSE SERPL-MCNC: 113 MG/DL — HIGH (ref 70–99)
HCT VFR BLD CALC: 30.4 % — LOW (ref 34.5–45)
HGB BLD-MCNC: 9.5 G/DL — LOW (ref 11.5–15.5)
LDH SERPL L TO P-CCNC: 391 U/L — HIGH (ref 50–242)
LYMPHOCYTES # BLD AUTO: 4.5 K/UL — HIGH (ref 1–3.3)
LYMPHOCYTES # BLD AUTO: 49.8 % — HIGH (ref 13–44)
MAGNESIUM SERPL-MCNC: 1.7 MG/DL — SIGNIFICANT CHANGE UP (ref 1.6–2.6)
MCHC RBC-ENTMCNC: 31.2 GM/DL — LOW (ref 32–36)
MCHC RBC-ENTMCNC: 31.7 PG — SIGNIFICANT CHANGE UP (ref 27–34)
MCV RBC AUTO: 102 FL — HIGH (ref 80–100)
MONOCYTES # BLD AUTO: 0.7 K/UL — SIGNIFICANT CHANGE UP (ref 0–0.9)
MONOCYTES NFR BLD AUTO: 7.9 % — SIGNIFICANT CHANGE UP (ref 2–14)
NEUTROPHILS # BLD AUTO: 3.6 K/UL — SIGNIFICANT CHANGE UP (ref 1.8–7.4)
NEUTROPHILS NFR BLD AUTO: 40.6 % — LOW (ref 43–77)
PHOSPHATE SERPL-MCNC: 2.7 MG/DL — SIGNIFICANT CHANGE UP (ref 2.5–4.5)
PLATELET # BLD AUTO: 39 K/UL — LOW (ref 150–400)
POTASSIUM SERPL-MCNC: 4.5 MMOL/L — SIGNIFICANT CHANGE UP (ref 3.5–5.3)
POTASSIUM SERPL-SCNC: 4.5 MMOL/L — SIGNIFICANT CHANGE UP (ref 3.5–5.3)
PROT SERPL-MCNC: 4.4 G/DL — LOW (ref 6–8.3)
RBC # BLD: 2.99 M/UL — LOW (ref 3.8–5.2)
RBC # FLD: 18 % — HIGH (ref 10.3–14.5)
SODIUM SERPL-SCNC: 139 MMOL/L — SIGNIFICANT CHANGE UP (ref 135–145)
WBC # BLD: 9 K/UL — SIGNIFICANT CHANGE UP (ref 3.8–10.5)
WBC # FLD AUTO: 9 K/UL — SIGNIFICANT CHANGE UP (ref 3.8–10.5)

## 2019-04-09 PROCEDURE — 74018 RADEX ABDOMEN 1 VIEW: CPT | Mod: 26

## 2019-04-09 PROCEDURE — 99291 CRITICAL CARE FIRST HOUR: CPT

## 2019-04-09 RX ORDER — HYDROMORPHONE HYDROCHLORIDE 2 MG/ML
1 INJECTION INTRAMUSCULAR; INTRAVENOUS; SUBCUTANEOUS EVERY 6 HOURS
Qty: 0 | Refills: 0 | Status: DISCONTINUED | OUTPATIENT
Start: 2019-04-09 | End: 2019-04-10

## 2019-04-09 RX ORDER — HYDROMORPHONE HYDROCHLORIDE 2 MG/ML
1 INJECTION INTRAMUSCULAR; INTRAVENOUS; SUBCUTANEOUS EVERY 6 HOURS
Qty: 0 | Refills: 0 | Status: DISCONTINUED | OUTPATIENT
Start: 2019-04-09 | End: 2019-04-09

## 2019-04-09 RX ADMIN — HYDROMORPHONE HYDROCHLORIDE 1 MILLIGRAM(S): 2 INJECTION INTRAMUSCULAR; INTRAVENOUS; SUBCUTANEOUS at 23:51

## 2019-04-09 RX ADMIN — VALGANCICLOVIR 450 MILLIGRAM(S): 450 TABLET, FILM COATED ORAL at 21:38

## 2019-04-09 RX ADMIN — ATOVAQUONE 750 MILLIGRAM(S): 750 SUSPENSION ORAL at 06:06

## 2019-04-09 RX ADMIN — HYDROMORPHONE HYDROCHLORIDE 1 MILLIGRAM(S): 2 INJECTION INTRAMUSCULAR; INTRAVENOUS; SUBCUTANEOUS at 06:15

## 2019-04-09 RX ADMIN — Medication 1 MILLIGRAM(S): at 12:41

## 2019-04-09 RX ADMIN — HYDROMORPHONE HYDROCHLORIDE 1 MILLIGRAM(S): 2 INJECTION INTRAMUSCULAR; INTRAVENOUS; SUBCUTANEOUS at 13:42

## 2019-04-09 RX ADMIN — VALGANCICLOVIR 450 MILLIGRAM(S): 450 TABLET, FILM COATED ORAL at 09:55

## 2019-04-09 RX ADMIN — LEVETIRACETAM 500 MILLIGRAM(S): 250 TABLET, FILM COATED ORAL at 06:06

## 2019-04-09 RX ADMIN — Medication 125 MILLIGRAM(S): at 23:11

## 2019-04-09 RX ADMIN — HYDROMORPHONE HYDROCHLORIDE 1 MILLIGRAM(S): 2 INJECTION INTRAMUSCULAR; INTRAVENOUS; SUBCUTANEOUS at 09:55

## 2019-04-09 RX ADMIN — Medication 5 MILLILITER(S): at 23:11

## 2019-04-09 RX ADMIN — Medication 125 MILLIGRAM(S): at 06:06

## 2019-04-09 RX ADMIN — Medication 5 MILLILITER(S): at 09:53

## 2019-04-09 RX ADMIN — Medication 1 TABLET(S): at 12:41

## 2019-04-09 RX ADMIN — Medication 125 MILLIGRAM(S): at 12:41

## 2019-04-09 RX ADMIN — HYDROMORPHONE HYDROCHLORIDE 1 MILLIGRAM(S): 2 INJECTION INTRAMUSCULAR; INTRAVENOUS; SUBCUTANEOUS at 18:55

## 2019-04-09 RX ADMIN — URSODIOL 300 MILLIGRAM(S): 250 TABLET, FILM COATED ORAL at 17:15

## 2019-04-09 RX ADMIN — TACROLIMUS 1 MILLIGRAM(S): 5 CAPSULE ORAL at 21:38

## 2019-04-09 RX ADMIN — TACROLIMUS 1 MILLIGRAM(S): 5 CAPSULE ORAL at 09:55

## 2019-04-09 RX ADMIN — HYDROMORPHONE HYDROCHLORIDE 1 MILLIGRAM(S): 2 INJECTION INTRAMUSCULAR; INTRAVENOUS; SUBCUTANEOUS at 19:44

## 2019-04-09 RX ADMIN — Medication 5 MILLILITER(S): at 19:58

## 2019-04-09 RX ADMIN — PANTOPRAZOLE SODIUM 40 MILLIGRAM(S): 20 TABLET, DELAYED RELEASE ORAL at 06:06

## 2019-04-09 RX ADMIN — HYDROMORPHONE HYDROCHLORIDE 1 MILLIGRAM(S): 2 INJECTION INTRAMUSCULAR; INTRAVENOUS; SUBCUTANEOUS at 02:00

## 2019-04-09 RX ADMIN — Medication 10 MILLIGRAM(S): at 13:43

## 2019-04-09 RX ADMIN — Medication 5 MILLILITER(S): at 12:41

## 2019-04-09 RX ADMIN — HYDROMORPHONE HYDROCHLORIDE 1 MILLIGRAM(S): 2 INJECTION INTRAMUSCULAR; INTRAVENOUS; SUBCUTANEOUS at 10:15

## 2019-04-09 RX ADMIN — HYDROMORPHONE HYDROCHLORIDE 1 MILLIGRAM(S): 2 INJECTION INTRAMUSCULAR; INTRAVENOUS; SUBCUTANEOUS at 14:02

## 2019-04-09 RX ADMIN — URSODIOL 300 MILLIGRAM(S): 250 TABLET, FILM COATED ORAL at 06:06

## 2019-04-09 RX ADMIN — ATOVAQUONE 750 MILLIGRAM(S): 750 SUSPENSION ORAL at 17:15

## 2019-04-09 RX ADMIN — HYDROMORPHONE HYDROCHLORIDE 1 MILLIGRAM(S): 2 INJECTION INTRAMUSCULAR; INTRAVENOUS; SUBCUTANEOUS at 06:00

## 2019-04-09 RX ADMIN — Medication 5 MILLILITER(S): at 17:18

## 2019-04-09 RX ADMIN — Medication 125 MILLIGRAM(S): at 17:15

## 2019-04-09 RX ADMIN — LEVETIRACETAM 500 MILLIGRAM(S): 250 TABLET, FILM COATED ORAL at 17:15

## 2019-04-09 RX ADMIN — HYDROMORPHONE HYDROCHLORIDE 1 MILLIGRAM(S): 2 INJECTION INTRAMUSCULAR; INTRAVENOUS; SUBCUTANEOUS at 02:15

## 2019-04-09 RX ADMIN — Medication 10 MILLIGRAM(S): at 21:38

## 2019-04-09 NOTE — CHART NOTE - NSCHARTNOTEFT_GEN_A_CORE
Pt seen for nutrition follow up/calorie count results. 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     PO intake:  < 50% [x ] 50-75% [ ]   % [ ]  other : pt reports appetite has remained suboptimal, S/P 3 day calorie count 4/3-4/5, Day 1: 655 Kcal, 30g protein, Day 2: 1497 Kcal, 61g protein, Day 3: 504 Kcal, 16g protein. 3 day average 885 Kcal, 36g protein to meet 40% low end calorie and 49% low end protein needs.    Estimated needs:    2184 Kcal (20 Kcal/Kg based on weight 109.5 Kg)  73g protein (1.4g/Kg based on IBW 52.2 Kg)      Current Weight: 235 lbs (3/30), 240.9 lbs (4/2), 242.7 lbs (4/9), weight increased from admission   % Weight Change    Pertinent Medications: MEDICATIONS  (STANDING):  atovaquone Suspension 750 milliGRAM(s) Oral two times a day  Biotene Dry Mouth Oral Rinse 5 milliLiter(s) Swish and Spit five times a day  folic acid 1 milliGRAM(s) Oral daily  HYDROmorphone  Injectable 1 milliGRAM(s) IV Push every 4 hours  levETIRAcetam 500 milliGRAM(s) Oral two times a day  metoclopramide Injectable 10 milliGRAM(s) IV Push three times a day  multivitamin 1 Tablet(s) Oral daily  pantoprazole    Tablet 40 milliGRAM(s) Oral before breakfast  tacrolimus 1 milliGRAM(s) Oral <User Schedule>  ursodiol Capsule 300 milliGRAM(s) Oral two times a day  valGANciclovir 450 milliGRAM(s) Oral two times a day  vancomycin    Solution 125 milliGRAM(s) Oral every 6 hours    MEDICATIONS  (PRN):  ondansetron    Tablet 8 milliGRAM(s) Oral three times a day PRN Nausea and/or Vomiting  ondansetron Injectable 8 milliGRAM(s) IV Push every 6 hours PRN Nausea and/or Vomiting  traMADol 25 milliGRAM(s) Oral every 12 hours PRN Mild Pain (1 - 3)    Pertinent Labs:  04-08 Na138 mmol/L Glu 112 mg/dL<H> K+ 4.5 mmol/L Cr  1.18 mg/dL BUN 20 mg/dL 04-08 Phos 3.3 mg/dL 04-08 Alb 2.9 g/dL<L>      Skin: 2+ hebert ankle edema, skin intact    Estimated Needs:   [ x] no change since previous assessment  [ ] recalculated:       Previous Nutrition Diagnosis:     [ x] Inadequate Protein-Energy Intake          Nutrition Diagnosis is [ x] ongoing, addressed with oral supplements          New Nutrition Diagnosis: [ x] not applicable         Interventions:     Recommend    1. Continue regular diet with ensure enlive 2 daily (350 kcal, 20g protein per 8 oz serving)  2. Continue to encourage po intake and obtain/honor food preferences as able, reviewed importance of adequate po intake, discussed ordering nutrient dense snacks with meals to save for in between to mimic smaller, more frequent meals. Will provide cream soups daily for lunch and dinner.        Monitoring and Evaluation:     [ x] PO intake [x ] Tolerance to diet prescription [x ] weights [ x] follow up per protocol    [ ] other: Pt seen for nutrition follow up/calorie count results. 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     PO intake:  < 50% [x ] 50-75% [ ]   % [ ]  other : pt reports appetite has remained suboptimal, S/P 3 day calorie count 4/3-4/5, Day 1: 655 Kcal, 30g protein, Day 2: 1497 Kcal, 61g protein, Day 3: 504 Kcal, 16g protein. 3 day average 885 Kcal, 36g protein to meet 40% low end calorie and 49% low end protein needs. Lately, pt has been taking cream of wheat and only 1 ensure for breakfast and having only soups for lunch and dinner, pt agreeable to cream soups, reviewed consuming snacks in between meals to further optimize intake.     Estimated needs:    2184 Kcal (20 Kcal/Kg based on weight 109.5 Kg)  73g protein (1.4g/Kg based on IBW 52.2 Kg)      Current Weight: 235 lbs (3/30), 240.9 lbs (4/2), 242.7 lbs (4/9), weight increased from admission   % Weight Change    Pertinent Medications: MEDICATIONS  (STANDING):  atovaquone Suspension 750 milliGRAM(s) Oral two times a day  Biotene Dry Mouth Oral Rinse 5 milliLiter(s) Swish and Spit five times a day  folic acid 1 milliGRAM(s) Oral daily  HYDROmorphone  Injectable 1 milliGRAM(s) IV Push every 4 hours  levETIRAcetam 500 milliGRAM(s) Oral two times a day  metoclopramide Injectable 10 milliGRAM(s) IV Push three times a day  multivitamin 1 Tablet(s) Oral daily  pantoprazole    Tablet 40 milliGRAM(s) Oral before breakfast  tacrolimus 1 milliGRAM(s) Oral <User Schedule>  ursodiol Capsule 300 milliGRAM(s) Oral two times a day  valGANciclovir 450 milliGRAM(s) Oral two times a day  vancomycin    Solution 125 milliGRAM(s) Oral every 6 hours    MEDICATIONS  (PRN):  ondansetron    Tablet 8 milliGRAM(s) Oral three times a day PRN Nausea and/or Vomiting  ondansetron Injectable 8 milliGRAM(s) IV Push every 6 hours PRN Nausea and/or Vomiting  traMADol 25 milliGRAM(s) Oral every 12 hours PRN Mild Pain (1 - 3)    Pertinent Labs:  04-08 Na138 mmol/L Glu 112 mg/dL<H> K+ 4.5 mmol/L Cr  1.18 mg/dL BUN 20 mg/dL 04-08 Phos 3.3 mg/dL 04-08 Alb 2.9 g/dL<L>      Skin: 2+ hebert ankle edema, skin intact    Estimated Needs:   [ x] no change since previous assessment  [ ] recalculated:       Previous Nutrition Diagnosis:     [ x] Inadequate Protein-Energy Intake          Nutrition Diagnosis is [ x] ongoing, addressed with oral supplements          New Nutrition Diagnosis: [ x] not applicable         Interventions:     Recommend    1. Continue regular diet with ensure enlive 2 daily (350 kcal, 20g protein per 8 oz serving)  2. Continue to encourage po intake and obtain/honor food preferences as able, reviewed importance of adequate po intake, discussed ordering nutrient dense snacks with meals to save for in between to mimic smaller, more frequent meals. Will provide cream soups daily for lunch and dinner.        Monitoring and Evaluation:     [ x] PO intake [x ] Tolerance to diet prescription [x ] weights [ x] follow up per protocol    [ ] other:

## 2019-04-09 NOTE — PROGRESS NOTE ADULT - ATTENDING COMMENTS
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  cmv pos..ct with vague findings in pancreas..amylase and lipase nl..unclear etiology of upper GI discomfort...improving slowly..ate better....?? related to virus..on valcyte 450 bid....cbc improved  non-neutropenic on px abx  on empiric po vanco for cdiff....being tapered slowly   Tacro level 2.71 on 4/5, repeat level on 4/8  cont tacro 1 mg bid  was on MMF 1G PO BID....stopped on 4/4...no gvhd  Most recent FISH for Y on 3/22 100% donor...repeat cmv pcr planned for twice weekly  cont valcyte 450 bid  Continue Actigall for VOD prevention  Continue Keppra for h/o seizures  Contnue Fluconazole for antifungal ppx  Continue Mepron for PCP ppx  Continue supportive care.. possible d/c end of  week  On dilaudid q4hr for pain control....will decrease to q 6 hr

## 2019-04-09 NOTE — PROGRESS NOTE ADULT - SUBJECTIVE AND OBJECTIVE BOX
[facility] Transplant Team                                                      Critical / Counseling Time Provided: 30 minutes                                                                                                                                                        Chief Complaint: hypotension, elevated WBC    S: Patient seen and examined with [facility] Transplant Team:   + chronic back pain  + abdominal pain stable    Denies mouth / tongue / throat pain, dyspnea, cough, nausea, vomiting, diarrhea      Vital Signs Last 24 Hrs  T(C): 36.7 (09 Apr 2019 05:16), Max: 36.9 (08 Apr 2019 09:44)  T(F): 98.1 (09 Apr 2019 05:16), Max: 98.4 (08 Apr 2019 09:44)  HR: 93 (09 Apr 2019 05:16) (80 - 100)  BP: 107/69 (09 Apr 2019 05:16) (101/66 - 112/69)  BP(mean): --  RR: 18 (09 Apr 2019 05:16) (18 - 20)  SpO2: 96% (09 Apr 2019 05:16) (96% - 99%)    Admit weight: 104.3 kg   Daily Weight in kG:     I&O's Summary    08 Apr 2019 07:01  -  09 Apr 2019 07:00  --------------------------------------------------------  IN: 728 mL / OUT: 1100 mL / NET: -372 mL    PE:   Oropharynx: Clear  Oral Mucositis:   clear                                                     Grade:   CVS: RRR  Lungs: CTA  Abdomen: + BS, soft, mild tenderness to palpation  Gastric Mucositis:   -                                               Grade:   Intestinal Mucositis:   -                                           Grade:   Extremities: +1 bilateral LE edema  Skin: No rash  Line: PIV  Neuro: Awake alert and oriented  Pain: Denies    Labs:                        11.4   14.4  )-----------( 37       ( 08 Apr 2019 10:12 )             35.5     04-08    138  |  104  |  20  ----------------------------<  112<H>  4.5   |  21<L>  |  1.18    Ca    9.7      08 Apr 2019 10:12  Phos  3.3     04-08  Mg     1.6     04-08    TPro  5.1<L>  /  Alb  2.9<L>  /  TBili  0.3  /  DBili  x   /  AST  101<H>  /  ALT  71<H>  /  AlkPhos  328<H>  04-08    PT/INR - ( 08 Apr 2019 10:12 )   PT: 10.9 sec;   INR: 0.95 ratio      PTT - ( 08 Apr 2019 13:18 )  PTT:32.4 sec    Cultures:     Culture - Blood (03.29.19 @ 21:18)    Specimen Source: .Blood Blood-Venous    Culture Results:   No growth at 5 days.    Radiology:   < from: CT Abdomen and Pelvis w/ Oral Cont (04.02.19 @ 15:14) >  IMPRESSION:   Small amount of ascites.  Mild peripancreatic haziness, nonspecific. Please correlate clinically   with laboratory values as acute pancreatitis is not excluded.    MEDICATIONS  (STANDING):  atovaquone Suspension 750 milliGRAM(s) Oral two times a day  Biotene Dry Mouth Oral Rinse 5 milliLiter(s) Swish and Spit five times a day  folic acid 1 milliGRAM(s) Oral daily  HYDROmorphone  Injectable 1 milliGRAM(s) IV Push every 4 hours  levETIRAcetam 500 milliGRAM(s) Oral two times a day  metoclopramide Injectable 10 milliGRAM(s) IV Push three times a day  multivitamin 1 Tablet(s) Oral daily  pantoprazole    Tablet 40 milliGRAM(s) Oral before breakfast  tacrolimus 1 milliGRAM(s) Oral <User Schedule>  ursodiol Capsule 300 milliGRAM(s) Oral two times a day  valGANciclovir 450 milliGRAM(s) Oral two times a day  vancomycin    Solution 125 milliGRAM(s) Oral every 6 hours    MEDICATIONS  (PRN):  ondansetron    Tablet 8 milliGRAM(s) Oral three times a day PRN Nausea and/or Vomiting  ondansetron Injectable 8 milliGRAM(s) IV Push every 6 hours PRN Nausea and/or Vomiting  traMADol 25 milliGRAM(s) Oral every 12 hours PRN Mild Pain (1 - 3)    A/P:   59 year old F with a history of relapsed ALL  Status Post Allogeneic PBSCT Feb 2019, Day +54  presents with low BP and diarrhea likely secondary to infection vs GVHD  Continue Vanco PO Q6  Last chimerism 3/22 100% donor  CMV PCR 18K on 3/30 and 12K on 4/1, NERI resolved but remains pancytopenic, increased Valcyte to BID dosing  Continue to hold antihypertensives  CT abdomen and pelvis with PO contrast showing ?pancreatitis. Amylase and lipase WNL  Calorie count x 72 hours (4/3-4/5), reglan IVP pre-meals  Discontinued Cefepime and MMF on 4/3, continue on Tacro.   4/5 Follow up repeat CMV PCR - pending   Tacro level 4.8 today- continue on current dosing  Check FISH for Y and ferritin today with AM labs, then consider qod labs if appropriate.    Transaminitis improving, continue to monitor.  Continue Dilaudid 1mg Q4 thru today, plan to taper to Q6 tomorrow.    Tentative plan for discharge later this week, will likely need frequent platelet transfusions while on Valcyte.  Consider PICC placement prior to discharge.    1. Infectious Disease:   atovaquone Suspension 750 milliGRAM(s) Oral two times a day  fluconAZOLE   Tablet 200 milliGRAM(s) Oral daily  valGANciclovir 450 milliGRAM(s) Oral two times a day  vancomycin    Solution 125 milliGRAM(s) Oral every 6 hours    2. VOD Prophylaxis: Actigall, Glutamine    3. GI Prophylaxis:  Protonix    4. Mouthcare - NS / NaHCO3 rinses, Mycelex, Biotene, skin care     5. GVHD prophylaxis     6. Transfuse & replete electrolytes prn     7. IV hydration, daily weights, strict I&O, prn diuresis     8. PO intake as tolerated, nutrition follow up as needed, MVI, folic acid   On Nepro    9. Antiemetics, anti-diarrhea medications:   Reglan, Ativan    10. OOB as tolerated, physical therapy consult if needed     11. Monitor coags / fibrinogen 2x week, vitamin K as needed     12. Monitor closely for clinical changes, monitor for fevers     13. Emotional support provided, plan of care discussed and questions addressed     14. Patient education done regarding chemotherapy prep, plan of care, restrictions and discharge planning     15. Continue regular social work input     I have written the above note for Dr. Bentley who performed service with me in the room.   Antonia Camarillo, ANP-BC ([phone])    I have seen and examined patient with NP, I agree with above note as scribed. [facility] Transplant Team                                                      Critical / Counseling Time Provided: 30 minutes                                                                                                                                                        Chief Complaint: hypotension, elevated WBC    S: Patient seen and examined with [facility] Transplant Team:     + chronic back pain  + abdominal pain worse today    Two normal BMs today    Denies mouth / tongue / throat pain, dyspnea, cough, nausea, vomiting, diarrhea      Vital Signs Last 24 Hrs  T(C): 36.7 (2019 05:16), Max: 36.9 (2019 09:44)  T(F): 98.1 (2019 05:16), Max: 98.4 (2019 09:44)  HR: 93 (2019 05:16) (80 - 100)  BP: 107/69 (2019 05:16) (101/66 - 112/69)  BP(mean): --  RR: 18 (2019 05:16) (18 - 20)  SpO2: 96% (2019 05:16) (96% - 99%)    Admit weight: 104.3 kg   Daily Weight in k.1 kg    I&O's Summary    2019 07:01  -  2019 07:00  --------------------------------------------------------  IN: 728 mL / OUT: 1100 mL / NET: -372 mL    PE:   Oropharynx: Clear  Oral Mucositis:   clear                                                     Grade:   CVS: RRR  Lungs: CTA  Abdomen: + BS, soft, mild tenderness to palpation  Gastric Mucositis:   -                                               Grade:   Intestinal Mucositis:   -                                           Grade:   Extremities: +1 bilateral LE edema  Skin: No rash  Line: PIV  Neuro: Awake alert and oriented  Pain: Denies    Labs:                        11.4   14.4  )-----------( 37       ( 2019 10:12 )             35.5     04-08    138  |  104  |  20  ----------------------------<  112<H>  4.5   |  21<L>  |  1.18    Ca    9.7      2019 10:12  Phos  3.3     04-08  Mg     1.6     04-08    TPro  5.1<L>  /  Alb  2.9<L>  /  TBili  0.3  /  DBili  x   /  AST  101<H>  /  ALT  71<H>  /  AlkPhos  328<H>      PT/INR - ( 2019 10:12 )   PT: 10.9 sec;   INR: 0.95 ratio      PTT - ( 2019 13:18 )  PTT:32.4 sec    Cultures:     Culture - Blood (19 @ 21:18)    Specimen Source: .Blood Blood-Venous    Culture Results:   No growth at 5 days.    Radiology:   < from: CT Abdomen and Pelvis w/ Oral Cont (19 @ 15:14) >  IMPRESSION:   Small amount of ascites.  Mild peripancreatic haziness, nonspecific. Please correlate clinically   with laboratory values as acute pancreatitis is not excluded.    MEDICATIONS  (STANDING):  atovaquone Suspension 750 milliGRAM(s) Oral two times a day  Biotene Dry Mouth Oral Rinse 5 milliLiter(s) Swish and Spit five times a day  folic acid 1 milliGRAM(s) Oral daily  HYDROmorphone  Injectable 1 milliGRAM(s) IV Push every 4 hours  levETIRAcetam 500 milliGRAM(s) Oral two times a day  metoclopramide Injectable 10 milliGRAM(s) IV Push three times a day  multivitamin 1 Tablet(s) Oral daily  pantoprazole    Tablet 40 milliGRAM(s) Oral before breakfast  tacrolimus 1 milliGRAM(s) Oral <User Schedule>  ursodiol Capsule 300 milliGRAM(s) Oral two times a day  valGANciclovir 450 milliGRAM(s) Oral two times a day  vancomycin    Solution 125 milliGRAM(s) Oral every 6 hours    MEDICATIONS  (PRN):  ondansetron    Tablet 8 milliGRAM(s) Oral three times a day PRN Nausea and/or Vomiting  ondansetron Injectable 8 milliGRAM(s) IV Push every 6 hours PRN Nausea and/or Vomiting  traMADol 25 milliGRAM(s) Oral every 12 hours PRN Mild Pain (1 - 3)    A/P:   59 year old F with a history of relapsed ALL  Status Post Allogeneic PBSCT 2019, Day +54  presents with low BP and diarrhea likely secondary to infection vs GVHD  Continue Vanco PO Q6  Last chimerism 3/22 100% donor  CMV PCR 18K on 3/30 and 12K on , NERI resolved but remains pancytopenic, increased Valcyte to BID dosing  Continue to hold antihypertensives  CT abdomen and pelvis with PO contrast showing ?pancreatitis. Amylase and lipase WNL  Calorie count x 72 hours (4/3-), reglan IVP pre-meals  Discontinued Cefepime and MMF on 4/3, continue on Tacro.    Follow up repeat CMV PCR - pending   Tacro level 4.8 today- continue on current dosing  Check FISH for Y and ferritin today with AM labs, then consider qod labs if appropriate.    Transaminitis improving, continue to monitor.  Continue Dilaudid 1mg Q4 thru today, plan to taper to Q6 tomorrow.    Tentative plan for discharge later this week, will likely need frequent platelet transfusions while on Valcyte.  Consider PICC placement prior to discharge.    1. Infectious Disease:   atovaquone Suspension 750 milliGRAM(s) Oral two times a day  fluconAZOLE   Tablet 200 milliGRAM(s) Oral daily  valGANciclovir 450 milliGRAM(s) Oral two times a day  vancomycin    Solution 125 milliGRAM(s) Oral every 6 hours    2. VOD Prophylaxis: Actigall, Glutamine    3. GI Prophylaxis:  Protonix    4. Mouthcare - NS / NaHCO3 rinses, Mycelex, Biotene, skin care     5. GVHD prophylaxis     6. Transfuse & replete electrolytes prn     7. IV hydration, daily weights, strict I&O, prn diuresis     8. PO intake as tolerated, nutrition follow up as needed, MVI, folic acid   On Nepro    9. Antiemetics, anti-diarrhea medications:   Reglan, Ativan    10. OOB as tolerated, physical therapy consult if needed     11. Monitor coags / fibrinogen 2x week, vitamin K as needed     12. Monitor closely for clinical changes, monitor for fevers     13. Emotional support provided, plan of care discussed and questions addressed     14. Patient education done regarding chemotherapy prep, plan of care, restrictions and discharge planning     15. Continue regular social work input     I have written the above note for Dr. Bentley who performed service with me in the room.   Antonia Camarillo, ANP-BC ([phone])    I have seen and examined patient with NP, I agree with above note as scribed.

## 2019-04-10 LAB
ALBUMIN SERPL ELPH-MCNC: 2.4 G/DL — LOW (ref 3.3–5)
ALP SERPL-CCNC: 298 U/L — HIGH (ref 40–120)
ALT FLD-CCNC: 63 U/L — HIGH (ref 10–45)
AMYLASE P1 CFR SERPL: 53 U/L — SIGNIFICANT CHANGE UP (ref 25–125)
ANION GAP SERPL CALC-SCNC: 9 MMOL/L — SIGNIFICANT CHANGE UP (ref 5–17)
AST SERPL-CCNC: 85 U/L — HIGH (ref 10–40)
BASOPHILS # BLD AUTO: 0 K/UL — SIGNIFICANT CHANGE UP (ref 0–0.2)
BASOPHILS NFR BLD AUTO: 0.5 % — SIGNIFICANT CHANGE UP (ref 0–2)
BILIRUB SERPL-MCNC: 0.3 MG/DL — SIGNIFICANT CHANGE UP (ref 0.2–1.2)
BLD GP AB SCN SERPL QL: NEGATIVE — SIGNIFICANT CHANGE UP
BUN SERPL-MCNC: 18 MG/DL — SIGNIFICANT CHANGE UP (ref 7–23)
CALCIUM SERPL-MCNC: 9.1 MG/DL — SIGNIFICANT CHANGE UP (ref 8.4–10.5)
CHLORIDE SERPL-SCNC: 105 MMOL/L — SIGNIFICANT CHANGE UP (ref 96–108)
CMV DNA CSF QL NAA+PROBE: SIGNIFICANT CHANGE UP
CMV DNA SPEC NAA+PROBE-LOG#: 4.29 LOGIU/ML — HIGH
CO2 SERPL-SCNC: 27 MMOL/L — SIGNIFICANT CHANGE UP (ref 22–31)
CREAT SERPL-MCNC: 0.88 MG/DL — SIGNIFICANT CHANGE UP (ref 0.5–1.3)
EOSINOPHIL # BLD AUTO: 0.2 K/UL — SIGNIFICANT CHANGE UP (ref 0–0.5)
EOSINOPHIL NFR BLD AUTO: 2 % — SIGNIFICANT CHANGE UP (ref 0–6)
GLUCOSE SERPL-MCNC: 108 MG/DL — HIGH (ref 70–99)
HCT VFR BLD CALC: 29.1 % — LOW (ref 34.5–45)
HGB BLD-MCNC: 9.4 G/DL — LOW (ref 11.5–15.5)
LDH SERPL L TO P-CCNC: 385 U/L — HIGH (ref 50–242)
LIDOCAIN IGE QN: 23 U/L — SIGNIFICANT CHANGE UP (ref 7–60)
LYMPHOCYTES # BLD AUTO: 4.3 K/UL — HIGH (ref 1–3.3)
LYMPHOCYTES # BLD AUTO: 54.6 % — HIGH (ref 13–44)
MAGNESIUM SERPL-MCNC: 1.8 MG/DL — SIGNIFICANT CHANGE UP (ref 1.6–2.6)
MCHC RBC-ENTMCNC: 32.4 GM/DL — SIGNIFICANT CHANGE UP (ref 32–36)
MCHC RBC-ENTMCNC: 33.6 PG — SIGNIFICANT CHANGE UP (ref 27–34)
MCV RBC AUTO: 104 FL — HIGH (ref 80–100)
MONOCYTES # BLD AUTO: 0.6 K/UL — SIGNIFICANT CHANGE UP (ref 0–0.9)
MONOCYTES NFR BLD AUTO: 7.2 % — SIGNIFICANT CHANGE UP (ref 2–14)
NEUTROPHILS # BLD AUTO: 2.8 K/UL — SIGNIFICANT CHANGE UP (ref 1.8–7.4)
NEUTROPHILS NFR BLD AUTO: 35.7 % — LOW (ref 43–77)
PHOSPHATE SERPL-MCNC: 2.6 MG/DL — SIGNIFICANT CHANGE UP (ref 2.5–4.5)
PLATELET # BLD AUTO: 34 K/UL — LOW (ref 150–400)
POTASSIUM SERPL-MCNC: 4.9 MMOL/L — SIGNIFICANT CHANGE UP (ref 3.5–5.3)
POTASSIUM SERPL-SCNC: 4.9 MMOL/L — SIGNIFICANT CHANGE UP (ref 3.5–5.3)
PROT SERPL-MCNC: 4.4 G/DL — LOW (ref 6–8.3)
RBC # BLD: 2.8 M/UL — LOW (ref 3.8–5.2)
RBC # FLD: 18 % — HIGH (ref 10.3–14.5)
RH IG SCN BLD-IMP: POSITIVE — SIGNIFICANT CHANGE UP
SODIUM SERPL-SCNC: 141 MMOL/L — SIGNIFICANT CHANGE UP (ref 135–145)
WBC # BLD: 7.9 K/UL — SIGNIFICANT CHANGE UP (ref 3.8–10.5)
WBC # FLD AUTO: 7.9 K/UL — SIGNIFICANT CHANGE UP (ref 3.8–10.5)

## 2019-04-10 PROCEDURE — 99291 CRITICAL CARE FIRST HOUR: CPT

## 2019-04-10 PROCEDURE — 99222 1ST HOSP IP/OBS MODERATE 55: CPT | Mod: GC

## 2019-04-10 PROCEDURE — 74176 CT ABD & PELVIS W/O CONTRAST: CPT | Mod: 26

## 2019-04-10 RX ORDER — PANTOPRAZOLE SODIUM 20 MG/1
40 TABLET, DELAYED RELEASE ORAL EVERY 12 HOURS
Qty: 0 | Refills: 0 | Status: DISCONTINUED | OUTPATIENT
Start: 2019-04-10 | End: 2019-04-19

## 2019-04-10 RX ORDER — HYDROMORPHONE HYDROCHLORIDE 2 MG/ML
1 INJECTION INTRAMUSCULAR; INTRAVENOUS; SUBCUTANEOUS EVERY 4 HOURS
Qty: 0 | Refills: 0 | Status: DISCONTINUED | OUTPATIENT
Start: 2019-04-10 | End: 2019-04-10

## 2019-04-10 RX ORDER — AMITRIPTYLINE HCL 25 MG
25 TABLET ORAL AT BEDTIME
Qty: 0 | Refills: 0 | Status: DISCONTINUED | OUTPATIENT
Start: 2019-04-10 | End: 2019-04-19

## 2019-04-10 RX ORDER — SIMETHICONE 80 MG/1
80 TABLET, CHEWABLE ORAL EVERY 6 HOURS
Qty: 0 | Refills: 0 | Status: DISCONTINUED | OUTPATIENT
Start: 2019-04-10 | End: 2019-04-19

## 2019-04-10 RX ORDER — METOCLOPRAMIDE HCL 10 MG
10 TABLET ORAL EVERY 6 HOURS
Qty: 0 | Refills: 0 | Status: DISCONTINUED | OUTPATIENT
Start: 2019-04-10 | End: 2019-04-10

## 2019-04-10 RX ORDER — HYDROMORPHONE HYDROCHLORIDE 2 MG/ML
0.5 INJECTION INTRAMUSCULAR; INTRAVENOUS; SUBCUTANEOUS ONCE
Qty: 0 | Refills: 0 | Status: DISCONTINUED | OUTPATIENT
Start: 2019-04-10 | End: 2019-04-10

## 2019-04-10 RX ORDER — SUCRALFATE 1 G
1 TABLET ORAL EVERY 6 HOURS
Qty: 0 | Refills: 0 | Status: DISCONTINUED | OUTPATIENT
Start: 2019-04-10 | End: 2019-04-19

## 2019-04-10 RX ORDER — PANTOPRAZOLE SODIUM 20 MG/1
40 TABLET, DELAYED RELEASE ORAL EVERY 12 HOURS
Qty: 0 | Refills: 0 | Status: DISCONTINUED | OUTPATIENT
Start: 2019-04-10 | End: 2019-04-10

## 2019-04-10 RX ORDER — HYDROMORPHONE HYDROCHLORIDE 2 MG/ML
2 INJECTION INTRAMUSCULAR; INTRAVENOUS; SUBCUTANEOUS EVERY 4 HOURS
Qty: 0 | Refills: 0 | Status: DISCONTINUED | OUTPATIENT
Start: 2019-04-10 | End: 2019-04-11

## 2019-04-10 RX ORDER — METOCLOPRAMIDE HCL 10 MG
10 TABLET ORAL EVERY 6 HOURS
Qty: 0 | Refills: 0 | Status: DISCONTINUED | OUTPATIENT
Start: 2019-04-10 | End: 2019-04-19

## 2019-04-10 RX ADMIN — Medication 1 GRAM(S): at 17:22

## 2019-04-10 RX ADMIN — Medication 5 MILLILITER(S): at 21:34

## 2019-04-10 RX ADMIN — Medication 10 MILLIGRAM(S): at 17:24

## 2019-04-10 RX ADMIN — Medication 5 MILLILITER(S): at 23:10

## 2019-04-10 RX ADMIN — URSODIOL 300 MILLIGRAM(S): 250 TABLET, FILM COATED ORAL at 06:25

## 2019-04-10 RX ADMIN — Medication 5 MILLILITER(S): at 08:52

## 2019-04-10 RX ADMIN — Medication 1 MILLIGRAM(S): at 11:02

## 2019-04-10 RX ADMIN — HYDROMORPHONE HYDROCHLORIDE 2 MILLIGRAM(S): 2 INJECTION INTRAMUSCULAR; INTRAVENOUS; SUBCUTANEOUS at 23:21

## 2019-04-10 RX ADMIN — HYDROMORPHONE HYDROCHLORIDE 2 MILLIGRAM(S): 2 INJECTION INTRAMUSCULAR; INTRAVENOUS; SUBCUTANEOUS at 21:32

## 2019-04-10 RX ADMIN — SIMETHICONE 80 MILLIGRAM(S): 80 TABLET, CHEWABLE ORAL at 17:22

## 2019-04-10 RX ADMIN — Medication 5 MILLILITER(S): at 11:04

## 2019-04-10 RX ADMIN — Medication 10 MILLIGRAM(S): at 23:10

## 2019-04-10 RX ADMIN — HYDROMORPHONE HYDROCHLORIDE 1 MILLIGRAM(S): 2 INJECTION INTRAMUSCULAR; INTRAVENOUS; SUBCUTANEOUS at 00:05

## 2019-04-10 RX ADMIN — Medication 1 GRAM(S): at 11:01

## 2019-04-10 RX ADMIN — Medication 125 MILLIGRAM(S): at 06:25

## 2019-04-10 RX ADMIN — Medication 1 GRAM(S): at 23:10

## 2019-04-10 RX ADMIN — ATOVAQUONE 750 MILLIGRAM(S): 750 SUSPENSION ORAL at 17:20

## 2019-04-10 RX ADMIN — TACROLIMUS 1 MILLIGRAM(S): 5 CAPSULE ORAL at 10:03

## 2019-04-10 RX ADMIN — HYDROMORPHONE HYDROCHLORIDE 1 MILLIGRAM(S): 2 INJECTION INTRAMUSCULAR; INTRAVENOUS; SUBCUTANEOUS at 11:04

## 2019-04-10 RX ADMIN — HYDROMORPHONE HYDROCHLORIDE 0.5 MILLIGRAM(S): 2 INJECTION INTRAMUSCULAR; INTRAVENOUS; SUBCUTANEOUS at 08:53

## 2019-04-10 RX ADMIN — Medication 25 MILLIGRAM(S): at 21:33

## 2019-04-10 RX ADMIN — Medication 10 MILLIGRAM(S): at 06:25

## 2019-04-10 RX ADMIN — HYDROMORPHONE HYDROCHLORIDE 2 MILLIGRAM(S): 2 INJECTION INTRAMUSCULAR; INTRAVENOUS; SUBCUTANEOUS at 17:34

## 2019-04-10 RX ADMIN — SIMETHICONE 80 MILLIGRAM(S): 80 TABLET, CHEWABLE ORAL at 11:04

## 2019-04-10 RX ADMIN — Medication 1 TABLET(S): at 11:02

## 2019-04-10 RX ADMIN — ATOVAQUONE 750 MILLIGRAM(S): 750 SUSPENSION ORAL at 06:24

## 2019-04-10 RX ADMIN — Medication 5 MILLILITER(S): at 17:20

## 2019-04-10 RX ADMIN — Medication 125 MILLIGRAM(S): at 12:02

## 2019-04-10 RX ADMIN — HYDROMORPHONE HYDROCHLORIDE 1 MILLIGRAM(S): 2 INJECTION INTRAMUSCULAR; INTRAVENOUS; SUBCUTANEOUS at 10:58

## 2019-04-10 RX ADMIN — URSODIOL 300 MILLIGRAM(S): 250 TABLET, FILM COATED ORAL at 17:21

## 2019-04-10 RX ADMIN — VALGANCICLOVIR 450 MILLIGRAM(S): 450 TABLET, FILM COATED ORAL at 12:02

## 2019-04-10 RX ADMIN — LEVETIRACETAM 500 MILLIGRAM(S): 250 TABLET, FILM COATED ORAL at 06:25

## 2019-04-10 RX ADMIN — LEVETIRACETAM 500 MILLIGRAM(S): 250 TABLET, FILM COATED ORAL at 17:21

## 2019-04-10 RX ADMIN — SIMETHICONE 80 MILLIGRAM(S): 80 TABLET, CHEWABLE ORAL at 23:10

## 2019-04-10 RX ADMIN — Medication 125 MILLIGRAM(S): at 17:20

## 2019-04-10 RX ADMIN — Medication 125 MILLIGRAM(S): at 23:10

## 2019-04-10 RX ADMIN — PANTOPRAZOLE SODIUM 40 MILLIGRAM(S): 20 TABLET, DELAYED RELEASE ORAL at 17:24

## 2019-04-10 RX ADMIN — HYDROMORPHONE HYDROCHLORIDE 2 MILLIGRAM(S): 2 INJECTION INTRAMUSCULAR; INTRAVENOUS; SUBCUTANEOUS at 18:04

## 2019-04-10 RX ADMIN — PANTOPRAZOLE SODIUM 40 MILLIGRAM(S): 20 TABLET, DELAYED RELEASE ORAL at 06:25

## 2019-04-10 RX ADMIN — Medication 10 MILLIGRAM(S): at 11:02

## 2019-04-10 RX ADMIN — HYDROMORPHONE HYDROCHLORIDE 0.5 MILLIGRAM(S): 2 INJECTION INTRAMUSCULAR; INTRAVENOUS; SUBCUTANEOUS at 09:23

## 2019-04-10 RX ADMIN — Medication 10 MILLIGRAM(S): at 13:17

## 2019-04-10 RX ADMIN — HYDROMORPHONE HYDROCHLORIDE 1 MILLIGRAM(S): 2 INJECTION INTRAMUSCULAR; INTRAVENOUS; SUBCUTANEOUS at 15:14

## 2019-04-10 RX ADMIN — HYDROMORPHONE HYDROCHLORIDE 1 MILLIGRAM(S): 2 INJECTION INTRAMUSCULAR; INTRAVENOUS; SUBCUTANEOUS at 06:25

## 2019-04-10 RX ADMIN — TACROLIMUS 1 MILLIGRAM(S): 5 CAPSULE ORAL at 21:34

## 2019-04-10 RX ADMIN — HYDROMORPHONE HYDROCHLORIDE 1 MILLIGRAM(S): 2 INJECTION INTRAMUSCULAR; INTRAVENOUS; SUBCUTANEOUS at 14:44

## 2019-04-10 RX ADMIN — VALGANCICLOVIR 450 MILLIGRAM(S): 450 TABLET, FILM COATED ORAL at 21:33

## 2019-04-10 NOTE — CONSULT NOTE ADULT - ASSESSMENT
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 Her hospital course is c/b abdominal pain w/ nonsepcific pancreatic findings on CT.     **THIS NOTE IS INCOMPLETE*  Impression:   #Sepsis 2/2 CMV: Currently being treated w/ valcyte 450 PO daily. Monitoring CMV PCR twice weekly.    #Abdominal pain: CT from 4/2 revealed peripancreatic haziness, non specific and small amount of ascites. LIpase and amylase are normal. Pain control w/ dilaudid q6h, although patient is requiring break through doses.   #C. diff colitis: Hx of C. diff colitis in previous hospitalization. Currently being tapered off PO vancomycin   #Ph + ALL: Managed per primary team.         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 Her hospital course is c/b abdominal pain w/ nonsepcific pancreatic findings on CT.     **THIS NOTE IS INCOMPLETE*  Impression:   #Sepsis 2/2 CMV: Currently being treated w/ valcyte 450 PO daily. Monitoring CMV PCR twice weekly.    #Abdominal pain: CT from 4/2 revealed peripancreatic haziness, non specific and small amount of ascites. LIpase and amylase are normal. Pain control w/ dilaudid q6h, although patient is requiring break through doses.   Differential includes:   -Gastritis vs.   #C. diff colitis: Hx of C. diff colitis in previous hospitalization. Currently being tapered off PO vancomycin   #Ph + ALL: Managed per primary team.         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 Her hospital course is c/b abdominal pain w/ nonsepcific pancreatic findings on CT.     Impression:   #Sepsis 2/2 CMV: Currently being treated w/ valcyte 450 PO daily. Monitoring CMV PCR twice weekly.    #Abdominal pain: CT from 4/2 revealed peripancreatic haziness, non specific and small amount of ascites. Lipase and amylase are normal; however, normal lipase/amylase studies do not preclude pancreatitis in setting of abnormal imaging and clinical  presentation. Pain control w/ dilaudid q6h, although patient is requiring break through doses. Patient to repeat CT abd/pelvis w/ IV and PO contrast.   Differential includes:   -Gastritis vs. pancreatitis vs. GVHD  #C. diff colitis: Hx of C. diff colitis in previous hospitalization. Currently being tapered off PO vancomycin   #Ph + ALL: Managed per primary team.         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 Her hospital course is c/b abdominal pain w/ nonsepcific pancreatic findings on CT.     Impression:   #Sepsis 2/2 CMV: Currently being treated w/ valcyte 450 PO daily. Monitoring CMV PCR twice weekly.    #Abdominal pain: CT from 4/2 revealed peripancreatic haziness, non specific and small amount of ascites. Lipase and amylase are normal; however, normal lipase/amylase studies do not preclude pancreatitis in setting of abnormal imaging and clinical  presentation. Pain control w/ dilaudid q6h, although patient is requiring break through doses. Patient to repeat CT abd/pelvis w/ IV and PO contrast.   Differential includes:   -Gastritis vs. pancreatitis vs. GVHD  #C. diff colitis: Hx of C. diff colitis in previous hospitalization. Currently being tapered off PO vancomycin   #Ph + ALL: Managed per primary team.         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 Her hospital course is c/b abdominal pain w/ nonsepcific pancreatic findings on CT.     Impression:   #Sepsis 2/2 CMV: Currently being treated w/ valcyte 450 PO daily. Monitoring CMV PCR twice weekly.    #Abdominal pain: Patient's physical exam more consistent w/ abdominal wall pain as tenderness to light palpation along abdominal scar. However, CT from 4/2 revealed peripancreatic haziness, non specific and small amount of ascites. Lipase and amylase are normal; But, normal lipase/amylase studies do not preclude pancreatitis in setting of abnormal imaging and clinical  presentation. Pain control w/ dilaudid q6h, although patient is requiring break through doses. Patient to repeat CT abd/pelvis w/ IV and PO contrast.   Differential includes:   -abdominal wall pain vs. Gastritis vs. pancreatitis vs. GVHD  #C. diff colitis: Hx of C. diff colitis in previous hospitalization. Currently being tapered off PO vancomycin   #Ph + ALL: Managed per primary team.         Recommendations:   -f/u CT abd/pelvis w/ IV and PO contrast  -Continue w/ PPI BID  -For abdominal wall pain, recommend TCA or gabapentin

## 2019-04-10 NOTE — PROGRESS NOTE ADULT - ATTENDING COMMENTS
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  cmv pos..ct with vague findings in pancreas..amylase and lipase nl..unclear etiology of upper GI discomfort...improving slowly..ate better....?? related to virus..on valcyte 450 bid....cbc improved  non-neutropenic on px abx  on empiric po vanco for cdiff....being tapered slowly   Tacro level 2.71 on 4/5, repeat level on 4/8  cont tacro 1 mg bid  was on MMF 1G PO BID....stopped on 4/4...no gvhd  Most recent FISH for Y on 3/22 100% donor...repeat cmv pcr planned for twice weekly  cont valcyte 450 bid  Continue Actigall for VOD prevention  Continue Keppra for h/o seizures  Contnue Fluconazole for antifungal ppx  Continue Mepron for PCP ppx  Continue supportive care.. 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  cmv pos..ct with vague findings in pancreas..amylase and lipase nl..unclear etiology of upper GI discomfort...was improving slowly..now appears worse....abd discomfort....?? related to virus..on valcyte 450 bid....cbc improved..cmv pcr down  non-neutropenic on px abx  on empiric po vanco for cdiff....being tapered slowly   Tacro level 2.71 on 4/5, repeat level on 4/8 was adequate  cont tacro 1 mg bid  was on MMF 1G PO BID....stopped on 4/4...no gvhd  Most recent FISH for Y on 3/22 100% donor...repeat cmv pcr planned for twice weekly  cont valcyte 450 bid  Continue Actigall for VOD prevention  Continue Keppra for h/o seizures  Contnue Fluconazole for antifungal ppx  Continue Mepron for PCP ppx  Continue supportive care.. possible d/c end of  week  On dilaudid q4hr for pain control....will increase to q 4 hr  GI called..increase protonix, add carafate, mylicon, and reglan atc  will repeat ct with contrast.....??GVHD

## 2019-04-10 NOTE — PROGRESS NOTE ADULT - SUBJECTIVE AND OBJECTIVE BOX
[facility] Transplant Team                                                      Critical / Counseling Time Provided: 30 minutes                                                                                                                                                        Chief Complaint: hypotension, elevated WBC    S: Patient seen and examined with [facility] Transplant Team:     + chronic back pain  + abdominal pain worse today    Two normal BMs today    Denies mouth / tongue / throat pain, dyspnea, cough, nausea, vomiting, diarrhea      Vital Signs Last 24 Hrs  T(C): 36.6 (10 Apr 2019 05:31), Max: 37.3 (09 Apr 2019 21:57)  T(F): 97.9 (10 Apr 2019 05:31), Max: 99.1 (09 Apr 2019 21:57)  HR: 85 (10 Apr 2019 05:31) (85 - 96)  BP: 112/71 (10 Apr 2019 05:31) (98/66 - 112/71)  BP(mean): --  RR: 18 (10 Apr 2019 05:31) (16 - 18)  SpO2: 98% (10 Apr 2019 05:31) (97% - 100%)    Admit weight: 104.3 kg   Daily Weight in kG:     I&O's Summary    09 Apr 2019 07:01  -  10 Apr 2019 07:00  --------------------------------------------------------  IN: 861 mL / OUT: 850 mL / NET: 11 mL    PE:   Oropharynx: Clear  Oral Mucositis:   clear                                                     Grade:   CVS: RRR  Lungs: CTA  Abdomen: + BS, soft, mild tenderness to palpation  Gastric Mucositis:   -                                               Grade:   Intestinal Mucositis:   -                                           Grade:   Extremities: +1 bilateral LE edema  Skin: No rash  Line: PIV  Neuro: Awake alert and oriented  Pain: Denies    Labs:                        9.5    9.0   )-----------( 39       ( 09 Apr 2019 11:14 )             30.4     04-09    139  |  106  |  23  ----------------------------<  113<H>  4.5   |  25  |  1.05    Ca    9.2      09 Apr 2019 11:14  Phos  2.7     04-09  Mg     1.7     04-09    TPro  4.4<L>  /  Alb  2.5<L>  /  TBili  0.2  /  DBili  x   /  AST  83<H>  /  ALT  63<H>  /  AlkPhos  301<H>  04-09    PT/INR - ( 08 Apr 2019 10:12 )   PT: 10.9 sec;   INR: 0.95 ratio      PTT - ( 08 Apr 2019 13:18 )  PTT:32.4 sec    Cultures:     Culture - Blood (03.29.19 @ 21:18)    Specimen Source: .Blood Blood-Venous    Culture Results:   No growth at 5 days.    Radiology:   < from: CT Abdomen and Pelvis w/ Oral Cont (04.02.19 @ 15:14) >  IMPRESSION:   Small amount of ascites.  Mild peripancreatic haziness, nonspecific. Please correlate clinically   with laboratory values as acute pancreatitis is not excluded.    MEDICATIONS  (STANDING):  atovaquone Suspension 750 milliGRAM(s) Oral two times a day  Biotene Dry Mouth Oral Rinse 5 milliLiter(s) Swish and Spit five times a day  folic acid 1 milliGRAM(s) Oral daily  HYDROmorphone  Injectable 1 milliGRAM(s) IV Push every 4 hours  levETIRAcetam 500 milliGRAM(s) Oral two times a day  metoclopramide Injectable 10 milliGRAM(s) IV Push three times a day  multivitamin 1 Tablet(s) Oral daily  pantoprazole    Tablet 40 milliGRAM(s) Oral before breakfast  tacrolimus 1 milliGRAM(s) Oral <User Schedule>  ursodiol Capsule 300 milliGRAM(s) Oral two times a day  valGANciclovir 450 milliGRAM(s) Oral two times a day  vancomycin    Solution 125 milliGRAM(s) Oral every 6 hours    MEDICATIONS  (PRN):  ondansetron    Tablet 8 milliGRAM(s) Oral three times a day PRN Nausea and/or Vomiting  ondansetron Injectable 8 milliGRAM(s) IV Push every 6 hours PRN Nausea and/or Vomiting  traMADol 25 milliGRAM(s) Oral every 12 hours PRN Mild Pain (1 - 3)    A/P:   59 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 Amylase and lipase WNL  Calorie count x 72 hours (4/3-4/5), reglan IVP pre-meals  Discontinued Cefepime and MMF on 4/3, continue on Tacro.   4/5 Follow up repeat CMV PCR - pending   Tacro therapeutic  Follow-up ferritin improving  FISH for Y pending from 4/9   Transaminitis-0 continue to monitor and hold azoles  Consider PICC placement prior to discharge  Abdominal x-ray fro worsening pain unremarkable.  GI consult today.  F/U repeat amylase/lipase.    1. Infectious Disease:   atovaquone Suspension 750 milliGRAM(s) Oral two times a day  fluconAZOLE   Tablet 200 milliGRAM(s) Oral daily  valGANciclovir 450 milliGRAM(s) Oral two times a day  vancomycin    Solution 125 milliGRAM(s) Oral every 6 hours    2. VOD Prophylaxis: Actigall, Glutamine    3. GI Prophylaxis:  Protonix    4. Mouthcare - NS / NaHCO3 rinses, Mycelex, Biotene, skin care     5. GVHD prophylaxis     6. Transfuse & replete electrolytes prn     7. IV hydration, daily weights, strict I&O, prn diuresis     8. PO intake as tolerated, nutrition follow up as needed, MVI, folic acid   On Nepro    9. Antiemetics, anti-diarrhea medications:   Reglan, Ativan    10. OOB as tolerated, physical therapy consult if needed     11. Monitor coags / fibrinogen 2x week, vitamin K as needed     12. Monitor closely for clinical changes, monitor for fevers     13. Emotional support provided, plan of care discussed and questions addressed     14. Patient education done regarding chemotherapy prep, plan of care, restrictions and discharge planning     15. Continue regular social work input     I have written the above note for Dr. Bentley who performed service with me in the room.   Antonia Camarillo, ANP-BC ([phone])    I have seen and examined patient with NP, I agree with above note as scribed. [facility] Transplant Team                                                      Critical / Counseling Time Provided: 30 minutes                                                                                                                                                        Chief Complaint: hypotension, elevated WBC    S: Patient seen and examined with [facility] Transplant Team:     + chronic back pain  + abdominal pain worse today    Denies mouth / tongue / throat pain, dyspnea, cough, nausea, vomiting, diarrhea      Vital Signs Last 24 Hrs  T(C): 36.6 (10 Apr 2019 05:31), Max: 37.3 (2019 21:57)  T(F): 97.9 (10 Apr 2019 05:31), Max: 99.1 (2019 21:57)  HR: 85 (10 Apr 2019 05:31) (85 - 96)  BP: 112/71 (10 Apr 2019 05:) (98/66 - 112/71)  BP(mean): --  RR: 18 (10 Apr 2019 05:31) (16 - 18)  SpO2: 98% (10 Apr 2019 05:31) (97% - 100%)    Admit weight: 104.3 kg   Daily Weight in k.6 kg    I&O's Summary    2019 07:01  -  10 Apr 2019 07:00  --------------------------------------------------------  IN: 861 mL / OUT: 850 mL / NET: 11 mL    PE:   Oropharynx: Clear  Oral Mucositis:   clear                                                     Grade:   CVS: RRR  Lungs: CTA  Abdomen: + BS, soft, tenderness to palpation epigastric region  Gastric Mucositis:   -                                               Grade:   Intestinal Mucositis:   -                                           Grade:   Extremities: +1 bilateral LE edema  Skin: No rash  Line: PIV  Neuro: Awake alert and oriented  Pain: Denies    Labs:                        9.5    9.0   )-----------( 39       ( 2019 11:14 )             30.4     04-09    139  |  106  |  23  ----------------------------<  113<H>  4.5   |  25  |  1.05    Ca    9.2      2019 11:14  Phos  2.7     04-09  Mg     1.7     04-09    TPro  4.4<L>  /  Alb  2.5<L>  /  TBili  0.2  /  DBili  x   /  AST  83<H>  /  ALT  63<H>  /  AlkPhos  301<H>      PT/INR - ( 2019 10:12 )   PT: 10.9 sec;   INR: 0.95 ratio      PTT - ( 2019 13:18 )  PTT:32.4 sec    Cultures:     Culture - Blood (19 @ 21:18)    Specimen Source: .Blood Blood-Venous    Culture Results:   No growth at 5 days.    Radiology:   < from: CT Abdomen and Pelvis w/ Oral Cont (19 @ 15:14) >  IMPRESSION:   Small amount of ascites.  Mild peripancreatic haziness, nonspecific. Please correlate clinically   with laboratory values as acute pancreatitis is not excluded.    MEDICATIONS  (STANDING):  atovaquone Suspension 750 milliGRAM(s) Oral two times a day  Biotene Dry Mouth Oral Rinse 5 milliLiter(s) Swish and Spit five times a day  folic acid 1 milliGRAM(s) Oral daily  HYDROmorphone  Injectable 1 milliGRAM(s) IV Push every 4 hours  levETIRAcetam 500 milliGRAM(s) Oral two times a day  metoclopramide Injectable 10 milliGRAM(s) IV Push three times a day  multivitamin 1 Tablet(s) Oral daily  pantoprazole    Tablet 40 milliGRAM(s) Oral before breakfast  tacrolimus 1 milliGRAM(s) Oral <User Schedule>  ursodiol Capsule 300 milliGRAM(s) Oral two times a day  valGANciclovir 450 milliGRAM(s) Oral two times a day  vancomycin    Solution 125 milliGRAM(s) Oral every 6 hours    MEDICATIONS  (PRN):  ondansetron    Tablet 8 milliGRAM(s) Oral three times a day PRN Nausea and/or Vomiting  ondansetron Injectable 8 milliGRAM(s) IV Push every 6 hours PRN Nausea and/or Vomiting  traMADol 25 milliGRAM(s) Oral every 12 hours PRN Mild Pain (1 - 3)    A/P:   59 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 Amylase and lipase WNL  Calorie count x 72 hours (4/3-), reglan IVP pre-meals  Discontinued Cefepime and MMF on 4/3, continue on Tacro.    Follow up repeat CMV PCR - pending   Tacro therapeutic  Follow-up ferritin improving  FISH for Y pending from    Transaminitis-0 continue to monitor and hold azoles  Consider PICC placement prior to discharge  Calorie count 40% of adequate intake.   Abdominal x-ray for worsening pain unremarkable.  GI consult today.  Repeat amylase/lipase WNL.  Start carafate/reglan/mylicon/IV Protonix for presumed gastritis.  F/U repeat CT with oral and IV contrast.    1. Infectious Disease:   atovaquone Suspension 750 milliGRAM(s) Oral two times a day  fluconAZOLE   Tablet 200 milliGRAM(s) Oral daily  valGANciclovir 450 milliGRAM(s) Oral two times a day  vancomycin    Solution 125 milliGRAM(s) Oral every 6 hours    2. VOD Prophylaxis: Actigall, Glutamine    3. GI Prophylaxis:  Protonix    4. Mouthcare - NS / NaHCO3 rinses, Mycelex, Biotene, skin care     5. GVHD prophylaxis     6. Transfuse & replete electrolytes prn     7. IV hydration, daily weights, strict I&O, prn diuresis     8. PO intake as tolerated, nutrition follow up as needed, MVI, folic acid   On Nepro    9. Antiemetics, anti-diarrhea medications:   Reglan, Ativan    10. OOB as tolerated, physical therapy consult if needed     11. Monitor coags / fibrinogen 2x week, vitamin K as needed     12. Monitor closely for clinical changes, monitor for fevers     13. Emotional support provided, plan of care discussed and questions addressed     14. Patient education done regarding chemotherapy prep, plan of care, restrictions and discharge planning     15. Continue regular social work input     I have written the above note for Dr. Bentley who performed service with me in the room.   Antonia Camarillo, ANP-BC ([phone])    I have seen and examined patient with NP, I agree with above note as scribed. [facility] Transplant Team                                                      Critical / Counseling Time Provided: 30 minutes                                                                                                                                                        Chief Complaint: hypotension, elevated WBC    S: Patient seen and examined with [facility] Transplant Team:     + chronic back pain  + abdominal pain worse today    Denies mouth / tongue / throat pain, dyspnea, cough, nausea, vomiting, diarrhea      Vital Signs Last 24 Hrs  T(C): 36.6 (10 Apr 2019 05:31), Max: 37.3 (2019 21:57)  T(F): 97.9 (10 Apr 2019 05:31), Max: 99.1 (2019 21:57)  HR: 85 (10 Apr 2019 05:31) (85 - 96)  BP: 112/71 (10 Apr 2019 05:31) (98/66 - 112/71)  BP(mean): --  RR: 18 (10 Apr 2019 05:31) (16 - 18)  SpO2: 98% (10 Apr 2019 05:31) (97% - 100%)    Admit weight: 104.3 kg   Daily Weight in k.6 kg    I&O's Summary    2019 07:01  -  10 Apr 2019 07:00  --------------------------------------------------------  IN: 861 mL / OUT: 850 mL / NET: 11 mL    PE:   Oropharynx: Clear  Oral Mucositis:   clear                                                     Grade:   CVS: RRR  Lungs: CTA  Abdomen: + BS, soft, tenderness to palpation epigastric region  Gastric Mucositis:   -                                               Grade:   Intestinal Mucositis:   -                                           Grade:   Extremities: +1 bilateral LE edema  Skin: No rash  Line: PIV  Neuro: Awake alert and oriented  Pain: Denies    Karnofsky / Lansky Scale: 40%  GVHD: no evidence of acute GVHD   Skin: 0  Liver: 0  Gut: 0  Overall stGstrstastdstest:st st1st Labs:                        9.5    9.0   )-----------( 39       ( 2019 11:14 )             30.4     04-09    139  |  106  |  23  ----------------------------<  113<H>  4.5   |  25  |  1.05    Ca    9.2      2019 11:14  Phos  2.7     04-09  Mg     1.7         TPro  4.4<L>  /  Alb  2.5<L>  /  TBili  0.2  /  DBili  x   /  AST  83<H>  /  ALT  63<H>  /  AlkPhos  301<H>      PT/INR - ( 2019 10:12 )   PT: 10.9 sec;   INR: 0.95 ratio      PTT - ( 2019 13:18 )  PTT:32.4 sec    Cultures:     Culture - Blood (19 @ 21:18)    Specimen Source: .Blood Blood-Venous    Culture Results:   No growth at 5 days.    Radiology:   < from: CT Abdomen and Pelvis w/ Oral Cont (19 @ 15:14) >  IMPRESSION:   Small amount of ascites.  Mild peripancreatic haziness, nonspecific. Please correlate clinically   with laboratory values as acute pancreatitis is not excluded.    MEDICATIONS  (STANDING):  atovaquone Suspension 750 milliGRAM(s) Oral two times a day  Biotene Dry Mouth Oral Rinse 5 milliLiter(s) Swish and Spit five times a day  folic acid 1 milliGRAM(s) Oral daily  HYDROmorphone  Injectable 1 milliGRAM(s) IV Push every 4 hours  levETIRAcetam 500 milliGRAM(s) Oral two times a day  metoclopramide Injectable 10 milliGRAM(s) IV Push three times a day  multivitamin 1 Tablet(s) Oral daily  pantoprazole    Tablet 40 milliGRAM(s) Oral before breakfast  tacrolimus 1 milliGRAM(s) Oral <User Schedule>  ursodiol Capsule 300 milliGRAM(s) Oral two times a day  valGANciclovir 450 milliGRAM(s) Oral two times a day  vancomycin    Solution 125 milliGRAM(s) Oral every 6 hours    MEDICATIONS  (PRN):  ondansetron    Tablet 8 milliGRAM(s) Oral three times a day PRN Nausea and/or Vomiting  ondansetron Injectable 8 milliGRAM(s) IV Push every 6 hours PRN Nausea and/or Vomiting  traMADol 25 milliGRAM(s) Oral every 12 hours PRN Mild Pain (1 - 3)    A/P:   59 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 Amylase and lipase WNL  Calorie count x 72 hours (4/3-), reglan IVP pre-meals  Discontinued Cefepime and MMF on 4/3, continue on Tacro.    Follow up repeat CMV PCR - pending   Tacro therapeutic  Follow-up ferritin improving  FISH for Y pending from    Transaminitis-0 continue to monitor and hold azoles  Consider PICC placement prior to discharge  Calorie count 40% of adequate intake.   Abdominal x-ray for worsening pain unremarkable.  GI consult today.  Repeat amylase/lipase WNL.  Start carafate/reglan/mylicon/IV Protonix for presumed gastritis.  F/U repeat CT with oral and IV contrast.    1. Infectious Disease:   atovaquone Suspension 750 milliGRAM(s) Oral two times a day  fluconAZOLE   Tablet 200 milliGRAM(s) Oral daily  valGANciclovir 450 milliGRAM(s) Oral two times a day  vancomycin    Solution 125 milliGRAM(s) Oral every 6 hours    2. VOD Prophylaxis: Actigall, Glutamine    3. GI Prophylaxis:  Protonix    4. Mouthcare - NS / NaHCO3 rinses, Mycelex, Biotene, skin care     5. GVHD prophylaxis     6. Transfuse & replete electrolytes prn     7. IV hydration, daily weights, strict I&O, prn diuresis     8. PO intake as tolerated, nutrition follow up as needed, MVI, folic acid   On Nepro    9. Antiemetics, anti-diarrhea medications:   Reglan, Ativan    10. OOB as tolerated, physical therapy consult if needed     11. Monitor coags / fibrinogen 2x week, vitamin K as needed     12. Monitor closely for clinical changes, monitor for fevers     13. Emotional support provided, plan of care discussed and questions addressed     14. Patient education done regarding chemotherapy prep, plan of care, restrictions and discharge planning     15. Continue regular social work input     I have written the above note for Dr. Bentley who performed service with me in the room.   Antonia Camarillo, ANP-BC ([phone])    I have seen and examined patient with NP, I agree with above note as scribed.

## 2019-04-10 NOTE — CONSULT NOTE ADULT - SUBJECTIVE AND OBJECTIVE BOX
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  HR: 87 (04-10-19 @ 09:50) (85 - 96)  BP: 100/66 (04-10-19 @ 09:50) (98/66 - 112/71)  RR:  (16 - 18)  SpO2:  (97% - 100%)  Wt(kg): --    GENERAL: NAD, found lying in bed  HEENT:  Atraumatic, Normocephalic, moist mucous membranes, no scleral icterus  RESPIRATORY: Clear to auscultation bilaterally; No rales, rhonchi, wheezing  CARDIOVASCULAR: S1 and S2, regular rate and rhythm; No murmurs; no peripheral edema  GI: Soft, mildly distended, normal bowel sounds, tenderness to light palpation in epigastric region, mainly along abdominal scar.   SKIN: Dry, intact, No rashes or lesions  MUSCULOSKELETAL: Full range of motion, normal strength  NEURO: sensation intact, extraocular movements intact, no tremor  PSYCH: Alert and oriented x 3, normal affect, normal mood      CAPILLARY BLOOD GLUCOSE                                9.4    7.9   )-----------( 34       ( 10 Apr 2019 09:29 )             29.1       04-10    141  |  105  |  18  ----------------------------<  108<H>  4.9   |  27  |  0.88    EGFR if : 82  EGFR if non : 71    Ca    9.1      04-10  Mg     1.8     04-10  Phos  2.6     04-10    TPro  4.4<L>  /  Alb  2.4<L>  /  TBili  0.3  /  DBili  x   /  AST  85<H>  /  ALT  63<H>  /  AlkPhos  298<H>  -10      Thyroid Function Tests:              Radiology:

## 2019-04-10 NOTE — CONSULT NOTE ADULT - ATTENDING COMMENTS
Severe pain, tenderness to light palpation along abdominal scar.  Pain worsened with movement, not food  Likely neuropathic pain  Rec elavil 25mg at bedtime  Follow up CT

## 2019-04-11 LAB
ALBUMIN SERPL ELPH-MCNC: 2.4 G/DL — LOW (ref 3.3–5)
ALP SERPL-CCNC: 285 U/L — HIGH (ref 40–120)
ALT FLD-CCNC: 59 U/L — HIGH (ref 10–45)
ANION GAP SERPL CALC-SCNC: 11 MMOL/L — SIGNIFICANT CHANGE UP (ref 5–17)
APTT BLD: 33.3 SEC — SIGNIFICANT CHANGE UP (ref 27.5–36.3)
AST SERPL-CCNC: 76 U/L — HIGH (ref 10–40)
BASOPHILS # BLD AUTO: 0 K/UL — SIGNIFICANT CHANGE UP (ref 0–0.2)
BASOPHILS NFR BLD AUTO: 0.3 % — SIGNIFICANT CHANGE UP (ref 0–2)
BILIRUB SERPL-MCNC: 0.3 MG/DL — SIGNIFICANT CHANGE UP (ref 0.2–1.2)
BUN SERPL-MCNC: 14 MG/DL — SIGNIFICANT CHANGE UP (ref 7–23)
CALCIUM SERPL-MCNC: 9.1 MG/DL — SIGNIFICANT CHANGE UP (ref 8.4–10.5)
CHLORIDE SERPL-SCNC: 107 MMOL/L — SIGNIFICANT CHANGE UP (ref 96–108)
CO2 SERPL-SCNC: 24 MMOL/L — SIGNIFICANT CHANGE UP (ref 22–31)
CREAT SERPL-MCNC: 0.94 MG/DL — SIGNIFICANT CHANGE UP (ref 0.5–1.3)
EOSINOPHIL # BLD AUTO: 0 K/UL — SIGNIFICANT CHANGE UP (ref 0–0.5)
EOSINOPHIL NFR BLD AUTO: 0.3 % — SIGNIFICANT CHANGE UP (ref 0–6)
GLUCOSE SERPL-MCNC: 108 MG/DL — HIGH (ref 70–99)
HCT VFR BLD CALC: 28.2 % — LOW (ref 34.5–45)
HGB BLD-MCNC: 9.2 G/DL — LOW (ref 11.5–15.5)
INR BLD: 0.98 RATIO — SIGNIFICANT CHANGE UP (ref 0.88–1.16)
LDH SERPL L TO P-CCNC: 360 U/L — HIGH (ref 50–242)
LYMPHOCYTES # BLD AUTO: 3.2 K/UL — SIGNIFICANT CHANGE UP (ref 1–3.3)
LYMPHOCYTES # BLD AUTO: 50.5 % — HIGH (ref 13–44)
MAGNESIUM SERPL-MCNC: 1.6 MG/DL — SIGNIFICANT CHANGE UP (ref 1.6–2.6)
MCHC RBC-ENTMCNC: 32.8 GM/DL — SIGNIFICANT CHANGE UP (ref 32–36)
MCHC RBC-ENTMCNC: 33.7 PG — SIGNIFICANT CHANGE UP (ref 27–34)
MCV RBC AUTO: 103 FL — HIGH (ref 80–100)
MONOCYTES # BLD AUTO: 0.6 K/UL — SIGNIFICANT CHANGE UP (ref 0–0.9)
MONOCYTES NFR BLD AUTO: 8.7 % — SIGNIFICANT CHANGE UP (ref 2–14)
NEUTROPHILS # BLD AUTO: 2.6 K/UL — SIGNIFICANT CHANGE UP (ref 1.8–7.4)
NEUTROPHILS NFR BLD AUTO: 40.2 % — LOW (ref 43–77)
PHOSPHATE SERPL-MCNC: 3.2 MG/DL — SIGNIFICANT CHANGE UP (ref 2.5–4.5)
PLATELET # BLD AUTO: 36 K/UL — LOW (ref 150–400)
POTASSIUM SERPL-MCNC: 4.2 MMOL/L — SIGNIFICANT CHANGE UP (ref 3.5–5.3)
POTASSIUM SERPL-SCNC: 4.2 MMOL/L — SIGNIFICANT CHANGE UP (ref 3.5–5.3)
PROT SERPL-MCNC: 4.1 G/DL — LOW (ref 6–8.3)
PROTHROM AB SERPL-ACNC: 11.2 SEC — SIGNIFICANT CHANGE UP (ref 10–12.9)
RBC # BLD: 2.74 M/UL — LOW (ref 3.8–5.2)
RBC # FLD: 18 % — HIGH (ref 10.3–14.5)
SODIUM SERPL-SCNC: 142 MMOL/L — SIGNIFICANT CHANGE UP (ref 135–145)
WBC # BLD: 6.3 K/UL — SIGNIFICANT CHANGE UP (ref 3.8–10.5)
WBC # FLD AUTO: 6.3 K/UL — SIGNIFICANT CHANGE UP (ref 3.8–10.5)

## 2019-04-11 PROCEDURE — 99291 CRITICAL CARE FIRST HOUR: CPT

## 2019-04-11 PROCEDURE — 36573 INSJ PICC RS&I 5 YR+: CPT

## 2019-04-11 RX ORDER — HYDROMORPHONE HYDROCHLORIDE 2 MG/ML
2 INJECTION INTRAMUSCULAR; INTRAVENOUS; SUBCUTANEOUS EVERY 4 HOURS
Qty: 0 | Refills: 0 | Status: DISCONTINUED | OUTPATIENT
Start: 2019-04-11 | End: 2019-04-18

## 2019-04-11 RX ORDER — SODIUM CHLORIDE 9 MG/ML
10 INJECTION INTRAMUSCULAR; INTRAVENOUS; SUBCUTANEOUS
Qty: 0 | Refills: 0 | Status: DISCONTINUED | OUTPATIENT
Start: 2019-04-11 | End: 2019-04-19

## 2019-04-11 RX ORDER — CHLORHEXIDINE GLUCONATE 213 G/1000ML
1 SOLUTION TOPICAL
Qty: 0 | Refills: 0 | Status: DISCONTINUED | OUTPATIENT
Start: 2019-04-11 | End: 2019-04-19

## 2019-04-11 RX ORDER — HYDROMORPHONE HYDROCHLORIDE 2 MG/ML
2 INJECTION INTRAMUSCULAR; INTRAVENOUS; SUBCUTANEOUS EVERY 4 HOURS
Qty: 0 | Refills: 0 | Status: DISCONTINUED | OUTPATIENT
Start: 2019-04-11 | End: 2019-04-11

## 2019-04-11 RX ADMIN — SIMETHICONE 80 MILLIGRAM(S): 80 TABLET, CHEWABLE ORAL at 05:03

## 2019-04-11 RX ADMIN — Medication 125 MILLIGRAM(S): at 23:18

## 2019-04-11 RX ADMIN — Medication 10 MILLIGRAM(S): at 18:48

## 2019-04-11 RX ADMIN — HYDROMORPHONE HYDROCHLORIDE 2 MILLIGRAM(S): 2 INJECTION INTRAMUSCULAR; INTRAVENOUS; SUBCUTANEOUS at 06:37

## 2019-04-11 RX ADMIN — LEVETIRACETAM 500 MILLIGRAM(S): 250 TABLET, FILM COATED ORAL at 05:03

## 2019-04-11 RX ADMIN — SIMETHICONE 80 MILLIGRAM(S): 80 TABLET, CHEWABLE ORAL at 18:47

## 2019-04-11 RX ADMIN — SIMETHICONE 80 MILLIGRAM(S): 80 TABLET, CHEWABLE ORAL at 14:38

## 2019-04-11 RX ADMIN — Medication 5 MILLILITER(S): at 19:34

## 2019-04-11 RX ADMIN — Medication 1 GRAM(S): at 14:46

## 2019-04-11 RX ADMIN — HYDROMORPHONE HYDROCHLORIDE 2 MILLIGRAM(S): 2 INJECTION INTRAMUSCULAR; INTRAVENOUS; SUBCUTANEOUS at 10:45

## 2019-04-11 RX ADMIN — Medication 5 MILLILITER(S): at 08:16

## 2019-04-11 RX ADMIN — HYDROMORPHONE HYDROCHLORIDE 2 MILLIGRAM(S): 2 INJECTION INTRAMUSCULAR; INTRAVENOUS; SUBCUTANEOUS at 10:06

## 2019-04-11 RX ADMIN — SIMETHICONE 80 MILLIGRAM(S): 80 TABLET, CHEWABLE ORAL at 23:18

## 2019-04-11 RX ADMIN — VALGANCICLOVIR 450 MILLIGRAM(S): 450 TABLET, FILM COATED ORAL at 10:46

## 2019-04-11 RX ADMIN — PANTOPRAZOLE SODIUM 40 MILLIGRAM(S): 20 TABLET, DELAYED RELEASE ORAL at 05:03

## 2019-04-11 RX ADMIN — Medication 125 MILLIGRAM(S): at 18:47

## 2019-04-11 RX ADMIN — ATOVAQUONE 750 MILLIGRAM(S): 750 SUSPENSION ORAL at 18:48

## 2019-04-11 RX ADMIN — HYDROMORPHONE HYDROCHLORIDE 2 MILLIGRAM(S): 2 INJECTION INTRAMUSCULAR; INTRAVENOUS; SUBCUTANEOUS at 09:36

## 2019-04-11 RX ADMIN — PANTOPRAZOLE SODIUM 40 MILLIGRAM(S): 20 TABLET, DELAYED RELEASE ORAL at 18:48

## 2019-04-11 RX ADMIN — Medication 5 MILLILITER(S): at 18:48

## 2019-04-11 RX ADMIN — HYDROMORPHONE HYDROCHLORIDE 2 MILLIGRAM(S): 2 INJECTION INTRAMUSCULAR; INTRAVENOUS; SUBCUTANEOUS at 14:58

## 2019-04-11 RX ADMIN — Medication 1 TABLET(S): at 14:38

## 2019-04-11 RX ADMIN — Medication 5 MILLILITER(S): at 14:39

## 2019-04-11 RX ADMIN — Medication 1 MILLIGRAM(S): at 14:37

## 2019-04-11 RX ADMIN — VALGANCICLOVIR 450 MILLIGRAM(S): 450 TABLET, FILM COATED ORAL at 21:13

## 2019-04-11 RX ADMIN — HYDROMORPHONE HYDROCHLORIDE 2 MILLIGRAM(S): 2 INJECTION INTRAMUSCULAR; INTRAVENOUS; SUBCUTANEOUS at 11:00

## 2019-04-11 RX ADMIN — Medication 100 MILLIGRAM(S): at 14:59

## 2019-04-11 RX ADMIN — Medication 1 GRAM(S): at 18:47

## 2019-04-11 RX ADMIN — Medication 10 MILLIGRAM(S): at 23:18

## 2019-04-11 RX ADMIN — HYDROMORPHONE HYDROCHLORIDE 2 MILLIGRAM(S): 2 INJECTION INTRAMUSCULAR; INTRAVENOUS; SUBCUTANEOUS at 23:23

## 2019-04-11 RX ADMIN — Medication 1 GRAM(S): at 23:18

## 2019-04-11 RX ADMIN — TACROLIMUS 1 MILLIGRAM(S): 5 CAPSULE ORAL at 09:38

## 2019-04-11 RX ADMIN — Medication 10 MILLIGRAM(S): at 05:03

## 2019-04-11 RX ADMIN — URSODIOL 300 MILLIGRAM(S): 250 TABLET, FILM COATED ORAL at 05:03

## 2019-04-11 RX ADMIN — HYDROMORPHONE HYDROCHLORIDE 2 MILLIGRAM(S): 2 INJECTION INTRAMUSCULAR; INTRAVENOUS; SUBCUTANEOUS at 19:33

## 2019-04-11 RX ADMIN — Medication 25 MILLIGRAM(S): at 21:13

## 2019-04-11 RX ADMIN — CHLORHEXIDINE GLUCONATE 1 APPLICATION(S): 213 SOLUTION TOPICAL at 18:48

## 2019-04-11 RX ADMIN — Medication 1 GRAM(S): at 05:03

## 2019-04-11 RX ADMIN — Medication 5 MILLILITER(S): at 23:18

## 2019-04-11 RX ADMIN — TACROLIMUS 1 MILLIGRAM(S): 5 CAPSULE ORAL at 21:13

## 2019-04-11 RX ADMIN — Medication 125 MILLIGRAM(S): at 14:37

## 2019-04-11 RX ADMIN — HYDROMORPHONE HYDROCHLORIDE 2 MILLIGRAM(S): 2 INJECTION INTRAMUSCULAR; INTRAVENOUS; SUBCUTANEOUS at 16:15

## 2019-04-11 RX ADMIN — Medication 10 MILLIGRAM(S): at 14:38

## 2019-04-11 RX ADMIN — HYDROMORPHONE HYDROCHLORIDE 2 MILLIGRAM(S): 2 INJECTION INTRAMUSCULAR; INTRAVENOUS; SUBCUTANEOUS at 18:45

## 2019-04-11 RX ADMIN — LEVETIRACETAM 500 MILLIGRAM(S): 250 TABLET, FILM COATED ORAL at 18:48

## 2019-04-11 RX ADMIN — ATOVAQUONE 750 MILLIGRAM(S): 750 SUSPENSION ORAL at 05:03

## 2019-04-11 RX ADMIN — HYDROMORPHONE HYDROCHLORIDE 2 MILLIGRAM(S): 2 INJECTION INTRAMUSCULAR; INTRAVENOUS; SUBCUTANEOUS at 05:22

## 2019-04-11 RX ADMIN — URSODIOL 300 MILLIGRAM(S): 250 TABLET, FILM COATED ORAL at 18:48

## 2019-04-11 RX ADMIN — Medication 125 MILLIGRAM(S): at 05:03

## 2019-04-11 NOTE — PROGRESS NOTE ADULT - ATTENDING COMMENTS
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  cmv pos..ct with vague findings in pancreas..amylase and lipase nl..unclear etiology of upper GI discomfort...was improving slowly..now appears worse....abd discomfort....?? related to virus..on valcyte 450 bid....cbc improved..cmv pcr down  non-neutropenic on px abx  on empiric po vanco for cdiff....being tapered slowly   Tacro level 2.71 on 4/5, repeat level on 4/8 was adequate  cont tacro 1 mg bid  was on MMF 1G PO BID....stopped on 4/4...no gvhd  Most recent FISH for Y on 3/22 100% donor...repeat cmv pcr planned for twice weekly  cont valcyte 450 bid  Continue Actigall for VOD prevention  Continue Keppra for h/o seizures  Contnue Fluconazole for antifungal ppx  Continue Mepron for PCP ppx  Continue supportive care.. 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  cmv pos..ct with vague findings in pancreas..amylase and lipase nl..unclear etiology of upper GI discomfort...was improving slowly..now appears worse....abd discomfort....ct noted....?? findings related to virus vs gvhd.....on valcyte 450 bid....cbc improved..cmv pcr down overall..stable since 4/5  non-neutropenic on px abx  on empiric po vanco for cdiff....being tapered slowly   Tacro level 2.71 on 4/5, repeat level on 4/8 was adequate..repeat in am  cont tacro 1 mg bid  was on MMF 1G PO BID....stopped on 4/4...?? gvhd...grade 1 upper..overall grade 2...start solumedrol 100 mg bid  Most recent FISH for Y on  100% donor...repeat cmv pcr planned for twice weekly  cont valcyte 450 bid  Continue Actigall for VOD prevention  Continue Keppra for h/o seizures  Contnue Fluconazole for antifungal ppx  Continue Mepron for PCP ppx  Continue supportive care.. possible d/c end of  week  On dilaudid q4hr for pain control....will increase to 2 mg  GI called..increase protonix, add carafate, mylicon, and reglan atc  d/w IR pt needs pic placement...12 attempts made for peripheral iv unsuccessfully

## 2019-04-11 NOTE — PROGRESS NOTE ADULT - SUBJECTIVE AND OBJECTIVE BOX
Interventional Radiology Pre-Procedure Note    This is a 59y Female with PMH of gastritis, HTN, sciatica, ALL s/p autologous peripheral blood stem cell transplant 16, haploidentical transplant on 19, who was admitted with sepsis, CMV now being treated w/ valcyte. Pt with poor venous access and IR requested for PICC placement for phlebotomy and transfusions.    Case d/w NP Georgie. Blood cx neg from 3/29, afebrile. wbc wnl.     PAST MEDICAL & SURGICAL HISTORY:  Herpes zoster  Leukemia  Clostridium difficile diarrhea  Intractable migraine with status migrainosus, unspecified migraine type  Sciatica of right side  Chronic gastritis, presence of bleeding unspecified, unspecified gastritis type  Essential hypertension  Lipoma: left side  Elective surgical procedure: ommaya placement  History of  delivery     Vital Signs Last 24 Hrs  T(C): 36.7 (2019 09:12), Max: 37.6 (10 Apr 2019 21:50)  T(F): 98 (2019 09:12), Max: 99.7 (10 Apr 2019 21:50)  HR: 88 (2019 09:12) (87 - 97)  BP: 110/71 (2019 09:12) (106/66 - 131/77)  RR: 18 (2019 09:12) (17 - 18)  SpO2: 99% (2019 09:12) (95% - 100%)    Allergies: No Known Drug Allergies  shellfish (Nausea; Vomiting)    Physical Exam: Gen: NAD, A&Ox3    Labs:                         9.2    6.3   )-----------( 36       ( 2019 09:58 )             28.2     -    142  |  107  |  14  ----------------------------<  108<H>  4.2   |  24  |  0.94    Ca    9.1      2019 09:58  Phos  3.2     -11  Mg     1.6     11    TPro  4.1<L>  /  Alb  2.4<L>  /  TBili  0.3  /  DBili  x   /  AST  76<H>  /  ALT  59<H>  /  AlkPhos  285<H>  11    PT/INR - ( 2019 09:58 )   PT: 11.2 sec;   INR: 0.98 ratio         PTT - ( 2019 09:58 )  PTT:33.3 sec    Plan is for PICC placement. The procedure/ risks/ benefits/ alternatives were discussed with the pt and Informed consent obtained. All questions and concerns have been addressed at this time.

## 2019-04-11 NOTE — PROGRESS NOTE ADULT - SUBJECTIVE AND OBJECTIVE BOX
Interventional Radiology     Pre- Procedure   Patient and/or family/surrogate educated about central line-associated blood stream infection prevention practices  Central Line insertion checklist utilized    Catheter Type: (  ) Dialysis  (  ) Introducer  (   ) Central venous catheter   ( x ) peripherally inserted central catheter (PICC)      Number of Lumens: ( x ) single   (  ) Double   (   ) Triple  (  ) Antimicrobial catheter    TIME OUT performed prior to this procedure with verbal confirmation of correct patient identity, correct site, side and rhina (if applicable), agreement of the procedure, correct patient position, availability of necessary equipment.  The consent form (if applicable) is complete and accurate.  Safety precautions based on patient history or medication use has been addressed   RN at bedside    Procedure  The patient was placed in the ( x )Supine position, (   ) Trendelenburg postiton.  The patient's placement site was prepped with Chlorhexidine solution then drapped using maximum sterile barrier protection.  The area was injected with 5 cc of 2% Lidocaine.  Using the  (x  ) Seldinger technique    (  ) Modified Seldinger technique   ( x ) Ultrasound, the catheter was introduced.  Strict adherence to outlined aseptic technique, including hand washing prior to donning sterile barriers (gloves and gown), utilizing a mask and cap, plus draping the patient with a sterile drape was observed.  Uponcompletion of the line placement, the insertion site was covered with sterile dressing.    All materials used for catheter insertion, including the intact guide wire, were accounted for at the end of the procedure      Emergency placement ( x ) No    (   ) Yes   ( x  ) New Site    (   )  Guide Wire change      Attempted site and actual site ( x  ) Right  (   )  Left               brachial vein, 38cm in length         Post Procedure (Catheter Placement Verification)  Correct placement confirmed by pressure transducer or ultrasonography or venous saturation  The tip of the catheter is confirmed to be in the SVC by radiography which revealed no pneumothorax and line may be accessed

## 2019-04-11 NOTE — PROGRESS NOTE ADULT - SUBJECTIVE AND OBJECTIVE BOX
[facility]plant Team                                                      Critical / Counseling Time Provided: 30 minutes                                                                                                                                                        Chief Complaint: hypotension, elevated WBC    S: Patient seen and examined with [facility] Transplant Team:     + chronic back pain  + abdominal pain     Denies mouth / tongue / throat pain, dyspnea, cough, nausea, vomiting, diarrhea        O: Vitals:   Vital Signs Last 24 Hrs  T(C): 37.1 (2019 05:23), Max: 37.6 (10 Apr 2019 21:50)  T(F): 98.7 (2019 05:23), Max: 99.7 (10 Apr 2019 21:50)  HR: 87 (2019 05:23) (87 - 97)  BP: 131/77 (2019 05:23) (100/66 - 131/77)  BP(mean): --  RR: 18 (2019 05:23) (17 - 18)  SpO2: 100% (2019 05:23) (95% - 100%)    Admit weight: 104.3 kg Daily     Daily Weight in k.6 (10 Apr 2019 08:15)    Intake / Output:   -10 @ 07:01  -  04-11 @ 07:00  --------------------------------------------------------  IN: 720 mL / OUT: 1825 mL / NET: -1105 mL      PE:   Oropharynx: Clear  Oral Mucositis:   clear                                                     Grade:   CVS: RRR  Lungs: CTA  Abdomen: + BS, soft, tenderness to palpation epigastric region  Gastric Mucositis:   -                                               Grade:   Intestinal Mucositis:   -                                           Grade:   Extremities: +1 bilateral LE edema  Skin: No rash  Line: PIV  Neuro: Awake alert and oriented  Pain: moderate abdominal pain /10    Cultures:         Radiology:     CT Abdomen and Pelvis w/ Oral Cont (19 @ 15:14) >  IMPRESSION:   Small amount of ascites.  Mild peripancreatic haziness, nonspecific. Please correlate clinically   with laboratory values as acute pancreatitis is not excluded.    Meds:   Antimicrobials:   atovaquone Suspension 750 milliGRAM(s) Oral two times a day  valGANciclovir 450 milliGRAM(s) Oral two times a day  vancomycin    Solution 125 milliGRAM(s) Oral every 6 hours      Heme / Onc:       GI:  metoclopramide 10 milliGRAM(s) Oral every 6 hours  pantoprazole    Tablet 40 milliGRAM(s) Oral every 12 hours  simethicone 80 milliGRAM(s) Chew every 6 hours  sucralfate suspension 1 Gram(s) Oral every 6 hours  ursodiol Capsule 300 milliGRAM(s) Oral two times a day      Cardiovascular:       Immunologic:   tacrolimus 1 milliGRAM(s) Oral <User Schedule>      Other medications:   amitriptyline 25 milliGRAM(s) Oral at bedtime  Biotene Dry Mouth Oral Rinse 5 milliLiter(s) Swish and Spit five times a day  folic acid 1 milliGRAM(s) Oral daily  levETIRAcetam 500 milliGRAM(s) Oral two times a day  multivitamin 1 Tablet(s) Oral daily      PRN:   HYDROmorphone   Tablet 2 milliGRAM(s) Oral every 4 hours PRN  ondansetron    Tablet 8 milliGRAM(s) Oral three times a day PRN  ondansetron Injectable 8 milliGRAM(s) IV Push every 6 hours PRN      A/P:   59 year old F with a history of relapsed ALL  Status Post Allogeneic PBSCT 2019, 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 Amylase and lipase WNL  s/p Calorie count x 72 hours (4/3-), reglan IVP pre-meals  Discontinued Cefepime and MMF on 4/3, continue on Tacro.    Follow up repeat CMV PCR - pending   Tacro therapeutic  Follow-up ferritin improving  FISH for Y pending from    Transaminitis-0 continue to monitor and hold azoles  Consider PICC placement prior to discharge  Calorie count 40% of adequate intake.   Abdominal x-ray for worsening pain unremarkable.  GI consult today.  Repeat amylase/lipase WNL.  Start carafate/reglan/mylicon/IV Protonix for presumed gastritis.  repeat CT with oral and IV contrast c/w colitis     1. Infectious Disease:   atovaquone Suspension 750 milliGRAM(s) Oral two times a day  valGANciclovir 450 milliGRAM(s) Oral two times a day  vancomycin    Solution 125 milliGRAM(s) Oral every 6 hours    2. VOD Prophylaxis: Actigall, Glutamine    3. GI Prophylaxis:  Protonix    4. Mouthcare - NS / NaHCO3 rinses, Mycelex, Biotene, skin care     5. GVHD prophylaxis     6. Transfuse & replete electrolytes prn     7. IV hydration, daily weights, strict I&O, prn diuresis     8. PO intake as tolerated, nutrition follow up as needed, MVI, folic acid   On Nepro    9. Antiemetics, anti-diarrhea medications:   Reglan, Ativan    10. OOB as tolerated, physical therapy consult if needed     11. Monitor coags / fibrinogen 2x week, vitamin K as needed     12. Monitor closely for clinical changes, monitor for fevers     13. Emotional support provided, plan of care discussed and questions addressed     14. Patient education done regarding chemotherapy prep, plan of care, restrictions and discharge planning     15. Continue regular social work input     I have written the above note for Dr. Bentley who performed service with me in the room.   Lola Rinaldi NP ([phone])    I have seen and examined patient with NP, I agree with above note as scribed. [facility]                                                      Critical / Counseling Time Provided: 30 minutes                                                                                                                                                        Chief Complaint: hypotension, elevated WBC    S: Patient seen and examined with [facility] Team:     + chronic back pain  + abdominal pain   + intermittent nausea      Denies mouth / tongue / throat pain, dyspnea, cough, vomiting, diarrhea        O: Vitals:   Vital Signs Last 24 Hrs  T(C): 37.1 (2019 05:23), Max: 37.6 (10 Apr 2019 21:50)  T(F): 98.7 (2019 05:23), Max: 99.7 (10 Apr 2019 21:50)  HR: 87 (2019 05:23) (87 - 97)  BP: 131/77 (2019 05:23) (100/66 - 131/77)  BP(mean): --  RR: 18 (2019 05:23) (17 - 18)  SpO2: 100% (2019 05:23) (95% - 100%)    Admit weight: 104.3 kg Daily     Daily Weight in k.6 (10 Apr 2019 08:15)    Intake / Output:   -10 @ 07:01  -  04-11 @ 07:00  --------------------------------------------------------  IN: 720 mL / OUT: 1825 mL / NET: -1105 mL      PE:   Oropharynx: Clear  Oral Mucositis:   clear                                                     Grade:   CVS: RRR  Lungs: CTA  Abdomen: + BS, soft, tenderness to palpation epigastric region  Gastric Mucositis:   -                                               Grade:   Intestinal Mucositis:   -                                           Grade:   Extremities: +1 bilateral LE edema  Skin: No rash  Line: PIV  Neuro: Awake alert and oriented  Pain: moderate abdominal pain 5/10    Cultures:         Radiology:     CT Abdomen and Pelvis w/ Oral Cont (19 @ 15:14) >  IMPRESSION:   Small amount of ascites.  Mild peripancreatic haziness, nonspecific. Please correlate clinically   with laboratory values as acute pancreatitis is not excluded.    Meds:   Antimicrobials:   atovaquone Suspension 750 milliGRAM(s) Oral two times a day  valGANciclovir 450 milliGRAM(s) Oral two times a day  vancomycin    Solution 125 milliGRAM(s) Oral every 6 hours      Heme / Onc:       GI:  metoclopramide 10 milliGRAM(s) Oral every 6 hours  pantoprazole    Tablet 40 milliGRAM(s) Oral every 12 hours  simethicone 80 milliGRAM(s) Chew every 6 hours  sucralfate suspension 1 Gram(s) Oral every 6 hours  ursodiol Capsule 300 milliGRAM(s) Oral two times a day      Cardiovascular:       Immunologic:   tacrolimus 1 milliGRAM(s) Oral <User Schedule>      Other medications:   amitriptyline 25 milliGRAM(s) Oral at bedtime  Biotene Dry Mouth Oral Rinse 5 milliLiter(s) Swish and Spit five times a day  folic acid 1 milliGRAM(s) Oral daily  levETIRAcetam 500 milliGRAM(s) Oral two times a day  multivitamin 1 Tablet(s) Oral daily      PRN:   HYDROmorphone   Tablet 2 milliGRAM(s) Oral every 4 hours PRN  ondansetron    Tablet 8 milliGRAM(s) Oral three times a day PRN  ondansetron Injectable 8 milliGRAM(s) IV Push every 6 hours PRN      A/P:   59 year old F with a history of relapsed ALL  Status Post Allogeneic PBSCT 2019, 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 Amylase and lipase WNL  s/p Calorie count x 72 hours (4/3-), reglan IVP pre-meals  Discontinued Cefepime and MMF on 4/3, continue on Tacro.    Follow up repeat CMV PCR - pending   Tacro therapeutic  Follow-up ferritin improving  FISH for Y pending from    Transaminitis-0 continue to monitor and hold azoles  Consider PICC placement prior to discharge  Calorie count 40% of adequate intake.   Abdominal x-ray for worsening pain unremarkable.  GI consult today.  Repeat amylase/lipase WNL.  Start carafate/reglan/mylicon/IV Protonix for presumed gastritis.  repeat CT with oral and IV contrast c/w colitis     1. Infectious Disease:   atovaquone Suspension 750 milliGRAM(s) Oral two times a day  valGANciclovir 450 milliGRAM(s) Oral two times a day  vancomycin    Solution 125 milliGRAM(s) Oral every 6 hours    2. VOD Prophylaxis: Actigall, Glutamine    3. GI Prophylaxis:  Protonix    4. Mouthcare - NS / NaHCO3 rinses, Mycelex, Biotene, skin care     5. GVHD prophylaxis     6. Transfuse & replete electrolytes prn     7. IV hydration, daily weights, strict I&O, prn diuresis     8. PO intake as tolerated, nutrition follow up as needed, MVI, folic acid   On Nepro    9. Antiemetics, anti-diarrhea medications:   Reglan, Ativan    10. OOB as tolerated, physical therapy consult if needed     11. Monitor coags / fibrinogen 2x week, vitamin K as needed     12. Monitor closely for clinical changes, monitor for fevers     13. Emotional support provided, plan of care discussed and questions addressed     14. Patient education done regarding chemotherapy prep, plan of care, restrictions and discharge planning     15. Continue regular social work input     I have written the above note for Dr. Bentley who performed service with me in the room.   Lola Rinaldi NP ([phone])    I have seen and examined patient with NP, I agree with above note as scribed. [facility]plant Team                                                      Critical / Counseling Time Provided: 30 minutes                                                                                                                                                        Chief Complaint: hypotension, elevated WBC    S: Patient seen and examined with [facility] Transplant Team:     + chronic back pain  + abdominal pain   + intermittent nausea      Denies mouth / tongue / throat pain, dyspnea, cough, vomiting, diarrhea        O: Vitals:   Vital Signs Last 24 Hrs  T(C): 37.1 (2019 05:23), Max: 37.6 (10 Apr 2019 21:50)  T(F): 98.7 (2019 05:23), Max: 99.7 (10 Apr 2019 21:50)  HR: 87 (2019 05:23) (87 - 97)  BP: 131/77 (2019 05:23) (100/66 - 131/77)  BP(mean): --  RR: 18 (2019 05:23) (17 - 18)  SpO2: 100% (2019 05:23) (95% - 100%)    Admit weight: 104.3 kg Daily     Daily Weight in k.6 (10 Apr 2019 08:15)    Intake / Output:   -10 @ 07:01  -  04-11 @ 07:00  --------------------------------------------------------  IN: 720 mL / OUT: 1825 mL / NET: -1105 mL      PE:   Oropharynx: Clear  Oral Mucositis:   clear                                                     Grade:   CVS: RRR  Lungs: CTA  Abdomen: + BS, soft, tenderness to palpation epigastric region  Gastric Mucositis:   -                                               Grade:   Intestinal Mucositis:   -                                           Grade:   Extremities: +1 bilateral LE edema  Skin: No rash  Line: PIV  Neuro: Awake alert and oriented  Pain: moderate abdominal pain 5/10      Karnofsky / Lansky score: 40%  GVHD:   Skin: 0   Liver: 0   Upper GI: stage 1   Lower GI: 0   Overall Grade II     Cultures:         Radiology:     CT Abdomen and Pelvis w/ Oral Cont (19 @ 15:14) >  IMPRESSION:   Small amount of ascites.  Mild peripancreatic haziness, nonspecific. Please correlate clinically   with laboratory values as acute pancreatitis is not excluded.    Meds:   Antimicrobials:   atovaquone Suspension 750 milliGRAM(s) Oral two times a day  valGANciclovir 450 milliGRAM(s) Oral two times a day  vancomycin    Solution 125 milliGRAM(s) Oral every 6 hours      Heme / Onc:       GI:  metoclopramide 10 milliGRAM(s) Oral every 6 hours  pantoprazole    Tablet 40 milliGRAM(s) Oral every 12 hours  simethicone 80 milliGRAM(s) Chew every 6 hours  sucralfate suspension 1 Gram(s) Oral every 6 hours  ursodiol Capsule 300 milliGRAM(s) Oral two times a day      Cardiovascular:       Immunologic:   tacrolimus 1 milliGRAM(s) Oral <User Schedule>      Other medications:   amitriptyline 25 milliGRAM(s) Oral at bedtime  Biotene Dry Mouth Oral Rinse 5 milliLiter(s) Swish and Spit five times a day  folic acid 1 milliGRAM(s) Oral daily  levETIRAcetam 500 milliGRAM(s) Oral two times a day  multivitamin 1 Tablet(s) Oral daily      PRN:   HYDROmorphone   Tablet 2 milliGRAM(s) Oral every 4 hours PRN  ondansetron    Tablet 8 milliGRAM(s) Oral three times a day PRN  ondansetron Injectable 8 milliGRAM(s) IV Push every 6 hours PRN      A/P:   59 year old F with a history of relapsed ALL  Status Post Allogeneic PBSCT 2019, 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 Amylase and lipase WNL  s/p Calorie count x 72 hours (4/3-), reglan IVP pre-meals  Discontinued Cefepime and MMF on 4/3, continue on Tacro.    Follow up repeat CMV PCR - pending   Tacro therapeutic  Follow-up ferritin improving  FISH for Y pending from    Transaminitis-0 continue to monitor and hold azoles  Consider PICC placement prior to discharge  Calorie count 40% of adequate intake.   Abdominal x-ray for worsening pain unremarkable.  GI consult today.  Repeat amylase/lipase WNL.  Start carafate/reglan/mylicon/IV Protonix for presumed gastritis.  repeat CT with oral and IV contrast c/w colitis     1. Infectious Disease:   atovaquone Suspension 750 milliGRAM(s) Oral two times a day  valGANciclovir 450 milliGRAM(s) Oral two times a day  vancomycin    Solution 125 milliGRAM(s) Oral every 6 hours    2. VOD Prophylaxis: Actigall, Glutamine    3. GI Prophylaxis:  Protonix    4. Mouthcare - NS / NaHCO3 rinses, Mycelex, Biotene, skin care     5. GVHD prophylaxis     6. Transfuse & replete electrolytes prn     7. IV hydration, daily weights, strict I&O, prn diuresis     8. PO intake as tolerated, nutrition follow up as needed, MVI, folic acid   On Nepro    9. Antiemetics, anti-diarrhea medications:   Reglan, Ativan    10. OOB as tolerated, physical therapy consult if needed     11. Monitor coags / fibrinogen 2x week, vitamin K as needed     12. Monitor closely for clinical changes, monitor for fevers     13. Emotional support provided, plan of care discussed and questions addressed     14. Patient education done regarding chemotherapy prep, plan of care, restrictions and discharge planning     15. Continue regular social work input     I have written the above note for Dr. Bentley who performed service with me in the room.   Lola Rinaldi NP ([phone])    I have seen and examined patient with NP, I agree with above note as scribed. [facility]plant Team                                                      Critical / Counseling Time Provided: 30 minutes                                                                                                                                                        Chief Complaint: hypotension, elevated WBC    S: Patient seen and examined with [facility] Transplant Team:     + chronic back pain  + abdominal pain   + intermittent nausea      Denies mouth / tongue / throat pain, dyspnea, cough, vomiting, diarrhea        O: Vitals:   Vital Signs Last 24 Hrs  T(C): 37.1 (2019 05:23), Max: 37.6 (10 Apr 2019 21:50)  T(F): 98.7 (2019 05:23), Max: 99.7 (10 Apr 2019 21:50)  HR: 87 (2019 05:23) (87 - 97)  BP: 131/77 (2019 05:23) (100/66 - 131/77)  BP(mean): --  RR: 18 (2019 05:23) (17 - 18)  SpO2: 100% (2019 05:23) (95% - 100%)    Admit weight: 104.3 kg Daily     Daily Weight in k.6 (10 Apr 2019 08:15)    Intake / Output:   04-10 @ 07:01  -  -11 @ 07:00  --------------------------------------------------------  IN: 720 mL / OUT: 1825 mL / NET: -1105 mL      PE:   Oropharynx: Clear  Oral Mucositis:   clear                                                     Grade:   CVS: RRR  Lungs: CTA  Abdomen: + BS, soft, tenderness to palpation epigastric region  Gastric Mucositis:   -                                               Grade:   Intestinal Mucositis:   -                                           Grade:   Extremities: +1 bilateral LE edema  Skin: No rash  Line: PIV  Neuro: Awake alert and oriented  Pain: moderate abdominal pain 5/10            Radiology:  4/10 CT abdomen   Impression     Trace bilateral pleural effusion, abdominal pelvic ascites,   subcutaneous edema, and edema of the root of the mesentery. Correlate   with the patient's fluid and oncotic status.   Wall thickening of the right is nonspecific, and may be related to the   patient's fluid status. Colitis is not excluded.      CT Abdomen and Pelvis w/ Oral Cont (19 @ 15:14) >  IMPRESSION:   Small amount of ascites.  Mild peripancreatic haziness, nonspecific. Please correlate clinically   with laboratory values as acute pancreatitis is not excluded.    Meds:   Antimicrobials:   atovaquone Suspension 750 milliGRAM(s) Oral two times a day  valGANciclovir 450 milliGRAM(s) Oral two times a day  vancomycin    Solution 125 milliGRAM(s) Oral every 6 hours      Heme / Onc:       GI:  metoclopramide 10 milliGRAM(s) Oral every 6 hours  pantoprazole    Tablet 40 milliGRAM(s) Oral every 12 hours  simethicone 80 milliGRAM(s) Chew every 6 hours  sucralfate suspension 1 Gram(s) Oral every 6 hours  ursodiol Capsule 300 milliGRAM(s) Oral two times a day      Cardiovascular:       Immunologic:   tacrolimus 1 milliGRAM(s) Oral <User Schedule>      Other medications:   amitriptyline 25 milliGRAM(s) Oral at bedtime  Biotene Dry Mouth Oral Rinse 5 milliLiter(s) Swish and Spit five times a day  folic acid 1 milliGRAM(s) Oral daily  levETIRAcetam 500 milliGRAM(s) Oral two times a day  multivitamin 1 Tablet(s) Oral daily      PRN:   HYDROmorphone   Tablet 2 milliGRAM(s) Oral every 4 hours PRN  ondansetron    Tablet 8 milliGRAM(s) Oral three times a day PRN  ondansetron Injectable 8 milliGRAM(s) IV Push every 6 hours PRN      A/P:   59 year old F with a history of relapsed ALL  Status Post Allogeneic PBSCT 2019, 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 Amylase and lipase WNL, continue clears   s/p Calorie count x 72 hours (4/3-), reglan IVP pre-meals  Discontinued Cefepime and MMF on 4/3, continue on Tacro.    Follow up repeat CMV PCR - pending   Tacro therapeutic, follow up level in am   Follow-up ferritin improving  FISH for Y pending from    Transaminitis grade1 most likely due to antifungals:  continue to monitor and hold azoles  Calorie count 40% of adequate intake.   Abdominal x-ray for worsening pain unremarkable.  GI consult today.  Repeat amylase/lipase WNL.  Start carafate/reglan/mylicon/IV Protonix for presumed gastritis.  repeat CT with oral and IV contrast c/w ascitics, likely  related to virus vs gvhd, started solumedrol 100 mg IV BID today  PICC placement today    1. Infectious Disease:   atovaquone Suspension 750 milliGRAM(s) Oral two times a day  valGANciclovir 450 milliGRAM(s) Oral two times a day  vancomycin    Solution 125 milliGRAM(s) Oral every 6 hours    2. VOD Prophylaxis: Actigall, Glutamine    3. GI Prophylaxis:  Protonix    4. Mouthcare - NS / NaHCO3 rinses, Mycelex, Biotene, skin care     5. GVHD prophylaxis     6. Transfuse & replete electrolytes prn     7. IV hydration, daily weights, strict I&O, prn diuresis     8. PO intake as tolerated, nutrition follow up as needed, MVI, folic acid   On Nepro  Continue clears     9. Antiemetics, anti-diarrhea medications:   Reglan, Ativan    10. OOB as tolerated, physical therapy consult if needed     11. Monitor coags / fibrinogen 2x week, vitamin K as needed     12. Monitor closely for clinical changes, monitor for fevers     13. Emotional support provided, plan of care discussed and questions addressed     14. Patient education done regarding chemotherapy prep, plan of care, restrictions and discharge planning     15. Continue regular social work input     I have written the above note for Dr. Bentley who performed service with me in the room.   Lola Rinaldi NP ([phone])    I have seen and examined patient with NP, I agree with above note as scribed. [facility]plant Team                                                      Critical / Counseling Time Provided: 30 minutes                                                                                                                                                        Chief Complaint: hypotension, elevated WBC    S: Patient seen and examined with [facility] Transplant Team:     + chronic back pain  + abdominal pain   + intermittent nausea      Denies mouth / tongue / throat pain, dyspnea, cough, vomiting, diarrhea        O: Vitals:   Vital Signs Last 24 Hrs  T(C): 37.1 (2019 05:23), Max: 37.6 (10 Apr 2019 21:50)  T(F): 98.7 (2019 05:23), Max: 99.7 (10 Apr 2019 21:50)  HR: 87 (2019 05:23) (87 - 97)  BP: 131/77 (2019 05:23) (100/66 - 131/77)  BP(mean): --  RR: 18 (2019 05:23) (17 - 18)  SpO2: 100% (2019 05:23) (95% - 100%)    Admit weight: 104.3 kg Daily     Daily Weight in k.6 (10 Apr 2019 08:15)    Intake / Output:   04-10 @ 07:01  -  04-11 @ 07:00  --------------------------------------------------------  IN: 720 mL / OUT: 1825 mL / NET: -1105 mL      PE:   Oropharynx: Clear  Oral Mucositis:   clear                                                     Grade:   CVS: RRR  Lungs: CTA  Abdomen: + BS, soft, tenderness to palpation epigastric region  Gastric Mucositis:   -                                               Grade:   Intestinal Mucositis:   -                                           Grade:   Extremities: +1 bilateral LE edema  Skin: No rash  Line: PIV  Neuro: Awake alert and oriented  Pain: moderate abdominal pain 5/10    LABS:                          9.2    6.3   )-----------( 36       ( 2019 09:58 )             28.2         Mean Cell Volume : 103.0 fl  Mean Cell Hemoglobin : 33.7 pg  Mean Cell Hemoglobin Concentration : 32.8 gm/dL  Auto Neutrophil # : 2.6 K/uL  Auto Lymphocyte # : 3.2 K/uL  Auto Monocyte # : 0.6 K/uL  Auto Eosinophil # : 0.0 K/uL  Auto Basophil # : 0.0 K/uL  Auto Neutrophil % : 40.2 %  Auto Lymphocyte % : 50.5 %  Auto Monocyte % : 8.7 %  Auto Eosinophil % : 0.3 %  Auto Basophil % : 0.3 %          142  |  107  |  14  ----------------------------<  108<H>  4.2   |  24  |  0.94    Ca    9.1      2019 09:58  Phos  3.2     -  Mg     1.6         TPro  4.1<L>  /  Alb  2.4<L>  /  TBili  0.3  /  DBili  x   /  AST  76<H>  /  ALT  59<H>  /  AlkPhos  285<H>        Mg 1.6  Phos 3.2      PT/INR - ( 2019 09:58 )   PT: 11.2 sec;   INR: 0.98 ratio         PTT - ( 2019 09:58 )  PTT:33.3 sec      Uric Acid --                      Radiology:  4/10 CT abdomen   Impression     Trace bilateral pleural effusion, abdominal pelvic ascites,   subcutaneous edema, and edema of the root of the mesentery. Correlate   with the patient's fluid and oncotic status.   Wall thickening of the right is nonspecific, and may be related to the   patient's fluid status. Colitis is not excluded.      CT Abdomen and Pelvis w/ Oral Cont (19 @ 15:14) >  IMPRESSION:   Small amount of ascites.  Mild peripancreatic haziness, nonspecific. Please correlate clinically   with laboratory values as acute pancreatitis is not excluded.    Meds:   Antimicrobials:   atovaquone Suspension 750 milliGRAM(s) Oral two times a day  valGANciclovir 450 milliGRAM(s) Oral two times a day  vancomycin    Solution 125 milliGRAM(s) Oral every 6 hours      Heme / Onc:       GI:  metoclopramide 10 milliGRAM(s) Oral every 6 hours  pantoprazole    Tablet 40 milliGRAM(s) Oral every 12 hours  simethicone 80 milliGRAM(s) Chew every 6 hours  sucralfate suspension 1 Gram(s) Oral every 6 hours  ursodiol Capsule 300 milliGRAM(s) Oral two times a day      Cardiovascular:       Immunologic:   tacrolimus 1 milliGRAM(s) Oral <User Schedule>      Other medications:   amitriptyline 25 milliGRAM(s) Oral at bedtime  Biotene Dry Mouth Oral Rinse 5 milliLiter(s) Swish and Spit five times a day  folic acid 1 milliGRAM(s) Oral daily  levETIRAcetam 500 milliGRAM(s) Oral two times a day  multivitamin 1 Tablet(s) Oral daily      PRN:   HYDROmorphone   Tablet 2 milliGRAM(s) Oral every 4 hours PRN  ondansetron    Tablet 8 milliGRAM(s) Oral three times a day PRN  ondansetron Injectable 8 milliGRAM(s) IV Push every 6 hours PRN      A/P:   59 year old F with a history of relapsed ALL  Status Post Allogeneic PBSCT 2019, 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 Amylase and lipase WNL, continue clears   s/p Calorie count x 72 hours (4/3-), reglan IVP pre-meals  Discontinued Cefepime and MMF on 4/3, continue on Tacro.    Follow up repeat CMV PCR - pending   Tacro therapeutic, follow up level in am   Follow-up ferritin improving  FISH for Y pending from    Transaminitis grade1 most likely due to antifungals:  continue to monitor and hold azoles  Calorie count 40% of adequate intake.   Abdominal x-ray for worsening pain unremarkable.  GI consult today.  Repeat amylase/lipase WNL.  Start carafate/reglan/mylicon/IV Protonix for presumed gastritis.  repeat CT with oral and IV contrast c/w ascitics, likely  related to virus vs gvhd, started solumedrol 100 mg IV BID today  PICC placement today    1. Infectious Disease:   atovaquone Suspension 750 milliGRAM(s) Oral two times a day  valGANciclovir 450 milliGRAM(s) Oral two times a day  vancomycin    Solution 125 milliGRAM(s) Oral every 6 hours    2. VOD Prophylaxis: Actigall, Glutamine    3. GI Prophylaxis:  Protonix    4. Mouthcare - NS / NaHCO3 rinses, Mycelex, Biotene, skin care     5. GVHD prophylaxis     6. Transfuse & replete electrolytes prn     7. IV hydration, daily weights, strict I&O, prn diuresis     8. PO intake as tolerated, nutrition follow up as needed, MVI, folic acid   On Nepro  Continue clears     9. Antiemetics, anti-diarrhea medications:   Reglan, Ativan    10. OOB as tolerated, physical therapy consult if needed     11. Monitor coags / fibrinogen 2x week, vitamin K as needed     12. Monitor closely for clinical changes, monitor for fevers     13. Emotional support provided, plan of care discussed and questions addressed     14. Patient education done regarding chemotherapy prep, plan of care, restrictions and discharge planning     15. Continue regular social work input     I have written the above note for Dr. Bentley who performed service with me in the room.   Lola Rinaldi NP ([phone])    I have seen and examined patient with NP, I agree with above note as scribed.

## 2019-04-12 ENCOUNTER — APPOINTMENT (OUTPATIENT)
Dept: HEMATOLOGY ONCOLOGY | Facility: CLINIC | Age: 59
End: 2019-04-12

## 2019-04-12 LAB
ALBUMIN SERPL ELPH-MCNC: 3 G/DL — LOW (ref 3.3–5)
ALBUMIN SERPL ELPH-MCNC: 3.4 G/DL — SIGNIFICANT CHANGE UP (ref 3.3–5)
ALP SERPL-CCNC: 312 U/L — HIGH (ref 40–120)
ALP SERPL-CCNC: 68 U/L — SIGNIFICANT CHANGE UP (ref 40–120)
ALT FLD-CCNC: 12 U/L — SIGNIFICANT CHANGE UP (ref 10–45)
ALT FLD-CCNC: 59 U/L — HIGH (ref 10–45)
ANION GAP SERPL CALC-SCNC: 10 MMOL/L — SIGNIFICANT CHANGE UP (ref 5–17)
ANION GAP SERPL CALC-SCNC: 11 MMOL/L — SIGNIFICANT CHANGE UP (ref 5–17)
AST SERPL-CCNC: 12 U/L — SIGNIFICANT CHANGE UP (ref 10–40)
AST SERPL-CCNC: 66 U/L — HIGH (ref 10–40)
BASOPHILS # BLD AUTO: 0 K/UL — SIGNIFICANT CHANGE UP (ref 0–0.2)
BASOPHILS NFR BLD AUTO: 0.9 % — SIGNIFICANT CHANGE UP (ref 0–2)
BILIRUB SERPL-MCNC: 0.3 MG/DL — SIGNIFICANT CHANGE UP (ref 0.2–1.2)
BILIRUB SERPL-MCNC: 0.6 MG/DL — SIGNIFICANT CHANGE UP (ref 0.2–1.2)
BLD GP AB SCN SERPL QL: NEGATIVE — SIGNIFICANT CHANGE UP
BUN SERPL-MCNC: 18 MG/DL — SIGNIFICANT CHANGE UP (ref 7–23)
BUN SERPL-MCNC: 4 MG/DL — LOW (ref 7–23)
CALCIUM SERPL-MCNC: 8.6 MG/DL — SIGNIFICANT CHANGE UP (ref 8.4–10.5)
CALCIUM SERPL-MCNC: 9.7 MG/DL — SIGNIFICANT CHANGE UP (ref 8.4–10.5)
CHLORIDE SERPL-SCNC: 100 MMOL/L — SIGNIFICANT CHANGE UP (ref 96–108)
CHLORIDE SERPL-SCNC: 106 MMOL/L — SIGNIFICANT CHANGE UP (ref 96–108)
CO2 SERPL-SCNC: 23 MMOL/L — SIGNIFICANT CHANGE UP (ref 22–31)
CO2 SERPL-SCNC: 27 MMOL/L — SIGNIFICANT CHANGE UP (ref 22–31)
CREAT SERPL-MCNC: 0.57 MG/DL — SIGNIFICANT CHANGE UP (ref 0.5–1.3)
CREAT SERPL-MCNC: 1.05 MG/DL — SIGNIFICANT CHANGE UP (ref 0.5–1.3)
EOSINOPHIL # BLD AUTO: 0 K/UL — SIGNIFICANT CHANGE UP (ref 0–0.5)
EOSINOPHIL NFR BLD AUTO: 0.1 % — SIGNIFICANT CHANGE UP (ref 0–6)
GLUCOSE SERPL-MCNC: 113 MG/DL — HIGH (ref 70–99)
GLUCOSE SERPL-MCNC: 147 MG/DL — HIGH (ref 70–99)
HCT VFR BLD CALC: 24 % — LOW (ref 34.5–45)
HCT VFR BLD CALC: 30 % — LOW (ref 34.5–45)
HGB BLD-MCNC: 8.1 G/DL — LOW (ref 11.5–15.5)
HGB BLD-MCNC: 9.9 G/DL — LOW (ref 11.5–15.5)
LDH SERPL L TO P-CCNC: 390 U/L — HIGH (ref 50–242)
LDH SERPL L TO P-CCNC: 413 U/L — HIGH (ref 50–242)
LYMPHOCYTES # BLD AUTO: 1.5 K/UL — SIGNIFICANT CHANGE UP (ref 1–3.3)
LYMPHOCYTES # BLD AUTO: 30.8 % — SIGNIFICANT CHANGE UP (ref 13–44)
MAGNESIUM SERPL-MCNC: 1.8 MG/DL — SIGNIFICANT CHANGE UP (ref 1.6–2.6)
MAGNESIUM SERPL-MCNC: 2.2 MG/DL — SIGNIFICANT CHANGE UP (ref 1.6–2.6)
MCHC RBC-ENTMCNC: 30.7 PG — SIGNIFICANT CHANGE UP (ref 27–34)
MCHC RBC-ENTMCNC: 33.1 GM/DL — SIGNIFICANT CHANGE UP (ref 32–36)
MCHC RBC-ENTMCNC: 33.6 GM/DL — SIGNIFICANT CHANGE UP (ref 32–36)
MCHC RBC-ENTMCNC: 34.2 PG — HIGH (ref 27–34)
MCV RBC AUTO: 103 FL — HIGH (ref 80–100)
MCV RBC AUTO: 91.5 FL — SIGNIFICANT CHANGE UP (ref 80–100)
MONOCYTES # BLD AUTO: 0.1 K/UL — SIGNIFICANT CHANGE UP (ref 0–0.9)
MONOCYTES NFR BLD AUTO: 2.1 % — SIGNIFICANT CHANGE UP (ref 2–14)
NEUTROPHILS # BLD AUTO: 3.2 K/UL — SIGNIFICANT CHANGE UP (ref 1.8–7.4)
NEUTROPHILS NFR BLD AUTO: 66.1 % — SIGNIFICANT CHANGE UP (ref 43–77)
PHOSPHATE SERPL-MCNC: 2.8 MG/DL — SIGNIFICANT CHANGE UP (ref 2.5–4.5)
PHOSPHATE SERPL-MCNC: 3.9 MG/DL — SIGNIFICANT CHANGE UP (ref 2.5–4.5)
PLATELET # BLD AUTO: 13 K/UL — CRITICAL LOW (ref 150–400)
PLATELET # BLD AUTO: 51 K/UL — LOW (ref 150–400)
POTASSIUM SERPL-MCNC: 3.3 MMOL/L — LOW (ref 3.5–5.3)
POTASSIUM SERPL-MCNC: 4.7 MMOL/L — SIGNIFICANT CHANGE UP (ref 3.5–5.3)
POTASSIUM SERPL-SCNC: 3.3 MMOL/L — LOW (ref 3.5–5.3)
POTASSIUM SERPL-SCNC: 4.7 MMOL/L — SIGNIFICANT CHANGE UP (ref 3.5–5.3)
PROT SERPL-MCNC: 5.3 G/DL — LOW (ref 6–8.3)
PROT SERPL-MCNC: 6.6 G/DL — SIGNIFICANT CHANGE UP (ref 6–8.3)
RBC # BLD: 2.62 M/UL — LOW (ref 3.8–5.2)
RBC # BLD: 2.9 M/UL — LOW (ref 3.8–5.2)
RBC # FLD: 13.2 % — SIGNIFICANT CHANGE UP (ref 10.3–14.5)
RBC # FLD: 17.9 % — HIGH (ref 10.3–14.5)
RH IG SCN BLD-IMP: POSITIVE — SIGNIFICANT CHANGE UP
SODIUM SERPL-SCNC: 138 MMOL/L — SIGNIFICANT CHANGE UP (ref 135–145)
SODIUM SERPL-SCNC: 139 MMOL/L — SIGNIFICANT CHANGE UP (ref 135–145)
TACROLIMUS SERPL-MCNC: <2 NG/ML — SIGNIFICANT CHANGE UP
URATE SERPL-MCNC: 1.6 MG/DL — LOW (ref 2.5–7)
WBC # BLD: 0.2 K/UL — CRITICAL LOW (ref 3.8–10.5)
WBC # BLD: 4.9 K/UL — SIGNIFICANT CHANGE UP (ref 3.8–10.5)
WBC # FLD AUTO: 0.2 K/UL — CRITICAL LOW (ref 3.8–10.5)
WBC # FLD AUTO: 4.9 K/UL — SIGNIFICANT CHANGE UP (ref 3.8–10.5)

## 2019-04-12 PROCEDURE — 99291 CRITICAL CARE FIRST HOUR: CPT

## 2019-04-12 RX ADMIN — LEVETIRACETAM 500 MILLIGRAM(S): 250 TABLET, FILM COATED ORAL at 05:53

## 2019-04-12 RX ADMIN — Medication 125 MILLIGRAM(S): at 23:34

## 2019-04-12 RX ADMIN — SIMETHICONE 80 MILLIGRAM(S): 80 TABLET, CHEWABLE ORAL at 05:54

## 2019-04-12 RX ADMIN — TACROLIMUS 1 MILLIGRAM(S): 5 CAPSULE ORAL at 09:44

## 2019-04-12 RX ADMIN — URSODIOL 300 MILLIGRAM(S): 250 TABLET, FILM COATED ORAL at 18:35

## 2019-04-12 RX ADMIN — Medication 100 MILLIGRAM(S): at 14:23

## 2019-04-12 RX ADMIN — PANTOPRAZOLE SODIUM 40 MILLIGRAM(S): 20 TABLET, DELAYED RELEASE ORAL at 05:54

## 2019-04-12 RX ADMIN — HYDROMORPHONE HYDROCHLORIDE 2 MILLIGRAM(S): 2 INJECTION INTRAMUSCULAR; INTRAVENOUS; SUBCUTANEOUS at 18:52

## 2019-04-12 RX ADMIN — URSODIOL 300 MILLIGRAM(S): 250 TABLET, FILM COATED ORAL at 05:54

## 2019-04-12 RX ADMIN — PANTOPRAZOLE SODIUM 40 MILLIGRAM(S): 20 TABLET, DELAYED RELEASE ORAL at 18:34

## 2019-04-12 RX ADMIN — SIMETHICONE 80 MILLIGRAM(S): 80 TABLET, CHEWABLE ORAL at 23:34

## 2019-04-12 RX ADMIN — Medication 10 MILLIGRAM(S): at 05:54

## 2019-04-12 RX ADMIN — HYDROMORPHONE HYDROCHLORIDE 2 MILLIGRAM(S): 2 INJECTION INTRAMUSCULAR; INTRAVENOUS; SUBCUTANEOUS at 10:01

## 2019-04-12 RX ADMIN — VALGANCICLOVIR 450 MILLIGRAM(S): 450 TABLET, FILM COATED ORAL at 09:44

## 2019-04-12 RX ADMIN — Medication 10 MILLIGRAM(S): at 18:35

## 2019-04-12 RX ADMIN — TACROLIMUS 1 MILLIGRAM(S): 5 CAPSULE ORAL at 22:29

## 2019-04-12 RX ADMIN — HYDROMORPHONE HYDROCHLORIDE 2 MILLIGRAM(S): 2 INJECTION INTRAMUSCULAR; INTRAVENOUS; SUBCUTANEOUS at 18:34

## 2019-04-12 RX ADMIN — SIMETHICONE 80 MILLIGRAM(S): 80 TABLET, CHEWABLE ORAL at 18:35

## 2019-04-12 RX ADMIN — Medication 125 MILLIGRAM(S): at 18:35

## 2019-04-12 RX ADMIN — HYDROMORPHONE HYDROCHLORIDE 2 MILLIGRAM(S): 2 INJECTION INTRAMUSCULAR; INTRAVENOUS; SUBCUTANEOUS at 22:29

## 2019-04-12 RX ADMIN — ATOVAQUONE 750 MILLIGRAM(S): 750 SUSPENSION ORAL at 05:54

## 2019-04-12 RX ADMIN — SIMETHICONE 80 MILLIGRAM(S): 80 TABLET, CHEWABLE ORAL at 12:11

## 2019-04-12 RX ADMIN — Medication 125 MILLIGRAM(S): at 12:11

## 2019-04-12 RX ADMIN — Medication 125 MILLIGRAM(S): at 05:54

## 2019-04-12 RX ADMIN — Medication 10 MILLIGRAM(S): at 12:10

## 2019-04-12 RX ADMIN — Medication 1 MILLIGRAM(S): at 12:11

## 2019-04-12 RX ADMIN — Medication 1 GRAM(S): at 18:34

## 2019-04-12 RX ADMIN — Medication 25 MILLIGRAM(S): at 22:29

## 2019-04-12 RX ADMIN — Medication 5 MILLILITER(S): at 09:05

## 2019-04-12 RX ADMIN — HYDROMORPHONE HYDROCHLORIDE 2 MILLIGRAM(S): 2 INJECTION INTRAMUSCULAR; INTRAVENOUS; SUBCUTANEOUS at 14:19

## 2019-04-12 RX ADMIN — LEVETIRACETAM 500 MILLIGRAM(S): 250 TABLET, FILM COATED ORAL at 18:35

## 2019-04-12 RX ADMIN — HYDROMORPHONE HYDROCHLORIDE 2 MILLIGRAM(S): 2 INJECTION INTRAMUSCULAR; INTRAVENOUS; SUBCUTANEOUS at 23:00

## 2019-04-12 RX ADMIN — Medication 5 MILLILITER(S): at 12:10

## 2019-04-12 RX ADMIN — HYDROMORPHONE HYDROCHLORIDE 2 MILLIGRAM(S): 2 INJECTION INTRAMUSCULAR; INTRAVENOUS; SUBCUTANEOUS at 14:31

## 2019-04-12 RX ADMIN — Medication 1 GRAM(S): at 05:54

## 2019-04-12 RX ADMIN — Medication 100 MILLIGRAM(S): at 02:01

## 2019-04-12 RX ADMIN — HYDROMORPHONE HYDROCHLORIDE 2 MILLIGRAM(S): 2 INJECTION INTRAMUSCULAR; INTRAVENOUS; SUBCUTANEOUS at 00:00

## 2019-04-12 RX ADMIN — Medication 5 MILLILITER(S): at 20:35

## 2019-04-12 RX ADMIN — VALGANCICLOVIR 450 MILLIGRAM(S): 450 TABLET, FILM COATED ORAL at 22:29

## 2019-04-12 RX ADMIN — Medication 5 MILLILITER(S): at 23:34

## 2019-04-12 RX ADMIN — Medication 5 MILLILITER(S): at 15:57

## 2019-04-12 RX ADMIN — Medication 1 GRAM(S): at 23:34

## 2019-04-12 RX ADMIN — HYDROMORPHONE HYDROCHLORIDE 2 MILLIGRAM(S): 2 INJECTION INTRAMUSCULAR; INTRAVENOUS; SUBCUTANEOUS at 05:53

## 2019-04-12 RX ADMIN — CHLORHEXIDINE GLUCONATE 1 APPLICATION(S): 213 SOLUTION TOPICAL at 05:57

## 2019-04-12 RX ADMIN — Medication 1 GRAM(S): at 12:10

## 2019-04-12 RX ADMIN — Medication 1 TABLET(S): at 12:11

## 2019-04-12 RX ADMIN — HYDROMORPHONE HYDROCHLORIDE 2 MILLIGRAM(S): 2 INJECTION INTRAMUSCULAR; INTRAVENOUS; SUBCUTANEOUS at 09:44

## 2019-04-12 RX ADMIN — ATOVAQUONE 750 MILLIGRAM(S): 750 SUSPENSION ORAL at 18:34

## 2019-04-12 NOTE — PROGRESS NOTE ADULT - ATTENDING COMMENTS
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  cmv pos..ct with vague findings in pancreas..amylase and lipase nl..unclear etiology of upper GI discomfort...was improving slowly..now appears worse....abd discomfort....ct noted....?? findings related to virus vs gvhd.....on valcyte 450 bid....cbc improved..cmv pcr down overall..stable since 4/5  non-neutropenic on px abx  on empiric po vanco for cdiff....being tapered slowly   Tacro level 2.71 on 4/5, repeat level on 4/8 was adequate..repeat in am  cont tacro 1 mg bid  was on MMF 1G PO BID....stopped on 4/4...?? gvhd...grade 1 upper..overall grade 2...start solumedrol 100 mg bid  Most recent FISH for Y on  100% donor...repeat cmv pcr planned for twice weekly  cont valcyte 450 bid  Continue Actigall for VOD prevention  Continue Keppra for h/o seizures  Contnue Fluconazole for antifungal ppx  Continue Mepron for PCP ppx  Continue supportive care.. 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  cmv pos..ct with vague findings in pancreas..amylase and lipase nl..unclear etiology of upper GI discomfort...was improving slowly..now appears worse....cellcept stopped b/c of falling counts....abd discomfort....ct noted....?? findings related to virus vs gvhd.....on valcyte 450 bid....cbc improved..cmv pcr down overall..stable since 4/5  non-neutropenic on px abx  on empiric po vanco for cdiff....being tapered slowly   Tacro level 2.71 on 4/5, repeat level on 4/8 was adequate..repeat level in am  cont tacro 1 mg bid  was on MMF 1G PO BID....stopped on 4/4...?? gvhd...grade 1 upper..overall grade 2...started  solumedrol 100 mg bid..try to taper on monday to 90 mg...advance to full diet in am  Most recent FISH for Y on  100% donor...repeat cmv pcr planned for twice weekly  cont valcyte 450 bid  Continue Actigall for VOD prevention  Continue Keppra for h/o seizures  Contnue Fluconazole for antifungal ppx  Continue Mepron for PCP ppx  Continue supportive care.. possible d/c end of  week  On dilaudid q4hr for pain control....will increase to 2 mg  GI called..increase protonix, add carafate, mylicon, and reglan atc   IR  placed pic...12 attempts made for peripheral iv unsuccessfully

## 2019-04-12 NOTE — PROGRESS NOTE ADULT - SUBJECTIVE AND OBJECTIVE BOX
hospitals Transplant Team                                                      Critical / Counseling Time Provided: 30 minutes                                                                                                                                                        Chief Complaint: hypotension, elevated WBC    S: Patient seen and examined with hospitals Transplant Team:     + chronic back pain  + abdominal pain   + intermittent nausea      Denies mouth / tongue / throat pain, dyspnea, cough, vomiting, diarrhea        O: Vitals:   Vital Signs Last 24 Hrs  T(C): 36.1 (2019 05:15), Max: 37.3 (2019 17:16)  T(F): 96.9 (2019 05:15), Max: 99.1 (2019 17:16)  HR: 70 (2019 05:15) (70 - 102)  BP: 123/78 (2019 05:15) (110/71 - 150/90)  RR: 19 (2019 05:15) (18 - 19)  SpO2: 96% (2019 05:15) (96% - 99%)    Admit weight: 104.3 kg  Daily Weight in k.6 (2019 08:15)    Intake / Output:   04-11 @ 07:01  -  12 @ 07:00  --------------------------------------------------------  IN: 464 mL / OUT: 725 mL / NET: -261 mL      PE:   Oropharynx: Clear  Oral Mucositis:   clear                                                     Grade:   CVS: RRR  Lungs: CTA  Abdomen: + BS, soft, tenderness to palpation epigastric region  Gastric Mucositis:   -                                               Grade:   Intestinal Mucositis:   -                                           Grade:   Extremities: +1 bilateral LE edema  Skin: No rash  Line: PIV  Neuro: Awake alert and oriented  Pain: moderate abdominal pain 5/10      Labs:      Cultures:         Radiology:       Meds:   Antimicrobials:   atovaquone Suspension 750 milliGRAM(s) Oral two times a day  valGANciclovir 450 milliGRAM(s) Oral two times a day  vancomycin    Solution 125 milliGRAM(s) Oral every 6 hours      GI:  metoclopramide 10 milliGRAM(s) Oral every 6 hours  pantoprazole    Tablet 40 milliGRAM(s) Oral every 12 hours  simethicone 80 milliGRAM(s) Chew every 6 hours  sucralfate suspension 1 Gram(s) Oral every 6 hours  ursodiol Capsule 300 milliGRAM(s) Oral two times a day    Immunologic:   tacrolimus 1 milliGRAM(s) Oral <User Schedule>      Other medications:   amitriptyline 25 milliGRAM(s) Oral at bedtime  Biotene Dry Mouth Oral Rinse 5 milliLiter(s) Swish and Spit five times a day  chlorhexidine 4% Liquid 1 Application(s) Topical <User Schedule>  folic acid 1 milliGRAM(s) Oral daily  levETIRAcetam 500 milliGRAM(s) Oral two times a day  methylPREDNISolone sodium succinate Injectable 100 milliGRAM(s) IV Push every 12 hours  multivitamin 1 Tablet(s) Oral daily      PRN:   HYDROmorphone  Injectable 2 milliGRAM(s) IV Push every 4 hours PRN  ondansetron    Tablet 8 milliGRAM(s) Oral three times a day PRN  ondansetron Injectable 8 milliGRAM(s) IV Push every 6 hours PRN  sodium chloride 0.9% lock flush 10 milliLiter(s) IV Push every 1 hour PRN      A/P:   59 year old F with a history of relapsed ALL  Status Post Allogeneic PBSCT 2019, Day +57  presents with low BP and diarrhea likely secondary to infection vs GVHD  Continue Vanco PO Q6  Last chimerism 3/22 100% donor  CMV PCR 18K on 3/30 and 12K on , NERI resolved but remains pancytopenic, increased Valcyte to BID dosing  Continue to hold antihypertensives  CT abdomen and pelvis with PO contrast showing ?colitis vs pancreatitis . Amylase and lipase WNL, continue clears   s/p Calorie count x 72 hours (4/3-), reglan IVP pre-meals  Discontinued Cefepime and MMF on 4/3, continue on Tacro.    Follow up repeat CMV PCR - pending   Tacro therapeutic, follow up level in am   Follow-up ferritin improving  FISH for Y pending from    Transaminitis grade1 most likely due to antifungals:  continue to monitor and hold azoles  Calorie count 40% of adequate intake.   Abdominal x-ray for worsening pain unremarkable.  GI consult today.  Repeat amylase/lipase WNL.  Start carafate/reglan/mylicon/IV Protonix for presumed gastritis.  repeat CT with oral and IV contrast c/w ascitics, likely  related to virus vs gvhd, started solumedrol 100 mg IV BID today  PICC placement today    1. Infectious Disease:   atovaquone Suspension 750 milliGRAM(s) Oral two times a day  valGANciclovir 450 milliGRAM(s) Oral two times a day  vancomycin    Solution 125 milliGRAM(s) Oral every 6 hours    2. VOD Prophylaxis: Actigall, Glutamine    3. GI Prophylaxis:  Protonix    4. Mouthcare - NS / NaHCO3 rinses, Mycelex, Biotene, skin care     5. GVHD prophylaxis     6. Transfuse & replete electrolytes prn     7. IV hydration, daily weights, strict I&O, prn diuresis     8. PO intake as tolerated, nutrition follow up as needed, MVI, folic acid   On Nepro  Continue clears     9. Antiemetics, anti-diarrhea medications:   Reglan, Ativan    10. OOB as tolerated, physical therapy consult if needed     11. Monitor coags / fibrinogen 2x week, vitamin K as needed     12. Monitor closely for clinical changes, monitor for fevers     13. Emotional support provided, plan of care discussed and questions addressed     14. Patient education done regarding chemotherapy prep, plan of care, restrictions and discharge planning     15. Continue regular social work input     I have written the above note for Dr. Bentley who performed service with me in the room.   Janett Quezada NP-C ([phone])    I have seen and examined patient with NP, I agree with above note as scribed. [facility] Transplant Team                                                      Critical / Counseling Time Provided: 30 minutes                                                                                                                                                        Chief Complaint: hypotension, elevated WBC    S: Patient seen and examined with [facility] Transplant Team:   + chronic back pain  + abdominal pain   + intermittent nausea      Denies mouth / tongue / throat pain, dyspnea, cough, vomiting, diarrhea        O: Vitals:   Vital Signs Last 24 Hrs  T(C): 36.1 (2019 05:15), Max: 37.3 (2019 17:16)  T(F): 96.9 (2019 05:15), Max: 99.1 (2019 17:16)  HR: 70 (2019 05:15) (70 - 102)  BP: 123/78 (2019 05:15) (110/71 - 150/90)  RR: 19 (2019 05:15) (18 - 19)  SpO2: 96% (2019 05:15) (96% - 99%)    Admit weight: 104.3 kg  Daily Weight in k.6 (2019 08:15)    Intake / Output:   -11 @ 07:01  -  -12 @ 07:00  --------------------------------------------------------  IN: 464 mL / OUT: 725 mL / NET: -261 mL      PE:   Oropharynx: Clear  Oral Mucositis:   clear                                                     Grade:   CVS: RRR  Lungs: CTA  Abdomen: + BS, soft, tenderness to palpation epigastric region  Gastric Mucositis:   -                                               Grade:   Intestinal Mucositis:   -                                           Grade:   Extremities: +1 bilateral LE edema  Skin: No rash  Line: PIV  Neuro: Awake alert and oriented  Pain: moderate abdominal pain 5/10      Labs:                         8.1    0.2   )-----------( 13       ( 2019 06:37 )             24.0     2019 06:37    138    |  100    |  4      ----------------------------<  113    3.3     |  27     |  0.57     Ca    8.6        2019 06:37  Phos  2.8       2019 06:37  Mg     2.2       2019 06:37    TPro  6.6    /  Alb  3.4    /  TBili  0.6    /  DBili  x      /  AST  12     /  ALT  12     /  AlkPhos  68     2019 06:37    PT/INR - ( 2019 09:58 )   PT: 11.2 sec;   INR: 0.98 ratio    PTT - ( 2019 09:58 )  PTT:33.3 sec    LIVER FUNCTIONS - ( 2019 06:37 )  Alb: 3.4 g/dL / Pro: 6.6 g/dL / ALK PHOS: 68 U/L / ALT: 12 U/L / AST: 12 U/L / GGT: x           Cultures:     Culture - Blood (19 @ 21:18)    Specimen Source: .Blood Blood-Venous    Culture Results:   No growth at 5 days.    Culture - Blood (19 @ 21:18)    Specimen Source: .Blood Blood-Peripheral    Culture Results:   No growth at 5 days.      Radiology:     from: CT Abdomen and Pelvis w/ Oral Cont (04.10.19 @ 17:54)     IMPRESSION:   *   Trace bilateral pleural effusion, abdominal pelvic ascites,   subcutaneous edema, and edema of the root of the mesentery. Correlate   with the patient's fluid and oncotic status.  *  Wall thickening of the right is nonspecific, and may be related to the   patient's fluid status. Colitis is not excluded.      Meds:   Antimicrobials:   atovaquone Suspension 750 milliGRAM(s) Oral two times a day  valGANciclovir 450 milliGRAM(s) Oral two times a day  vancomycin    Solution 125 milliGRAM(s) Oral every 6 hours      GI:  metoclopramide 10 milliGRAM(s) Oral every 6 hours  pantoprazole    Tablet 40 milliGRAM(s) Oral every 12 hours  simethicone 80 milliGRAM(s) Chew every 6 hours  sucralfate suspension 1 Gram(s) Oral every 6 hours  ursodiol Capsule 300 milliGRAM(s) Oral two times a day    Immunologic:   tacrolimus 1 milliGRAM(s) Oral <User Schedule>      Other medications:   amitriptyline 25 milliGRAM(s) Oral at bedtime  Biotene Dry Mouth Oral Rinse 5 milliLiter(s) Swish and Spit five times a day  chlorhexidine 4% Liquid 1 Application(s) Topical <User Schedule>  folic acid 1 milliGRAM(s) Oral daily  levETIRAcetam 500 milliGRAM(s) Oral two times a day  methylPREDNISolone sodium succinate Injectable 100 milliGRAM(s) IV Push every 12 hours  multivitamin 1 Tablet(s) Oral daily      PRN:   HYDROmorphone  Injectable 2 milliGRAM(s) IV Push every 4 hours PRN  ondansetron    Tablet 8 milliGRAM(s) Oral three times a day PRN  ondansetron Injectable 8 milliGRAM(s) IV Push every 6 hours PRN  sodium chloride 0.9% lock flush 10 milliLiter(s) IV Push every 1 hour PRN      A/P:   59 year old F with a history of relapsed ALL  Status Post Allogeneic PBSCT 2019, Day +57  presents with low BP and diarrhea likely secondary to infection vs GVHD  Continue Vanco PO Q6  Last chimerism 3/22 100% donor  CMV PCR 18K on 3/30 and 12K on , NERI resolved but remains pancytopenic, increased Valcyte to BID dosing  Continue to hold antihypertensives  CT abdomen and pelvis with PO contrast showing ?colitis vs pancreatitis . Amylase and lipase WNL, continue clears   s/p Calorie count x 72 hours (4/3-), reglan IVP pre-meals  Discontinued Cefepime and MMF on 4/3, continue on Tacro.    Follow up repeat CMV PCR - pending   Tacro therapeutic, follow up level in am   Follow-up ferritin improving  FISH for Y pending from    Transaminitis grade1 most likely due to antifungals:  continue to monitor and hold azoles  Calorie count 40% of adequate intake.   Abdominal x-ray for worsening pain unremarkable.  GI consult today.  Repeat amylase/lipase WNL.  Start carafate/reglan/mylicon/IV Protonix for presumed gastritis.  repeat CT with oral and IV contrast c/w ascitics, likely  related to virus vs gvhd, started solumedrol 100 mg IV BID   PICC placed    1. Infectious Disease:   atovaquone Suspension 750 milliGRAM(s) Oral two times a day  valGANciclovir 450 milliGRAM(s) Oral two times a day  vancomycin    Solution 125 milliGRAM(s) Oral every 6 hours    2. VOD Prophylaxis: Actigall, Glutamine    3. GI Prophylaxis:  Protonix    4. Mouthcare - NS / NaHCO3 rinses, Mycelex, Biotene, skin care     5. GVHD prophylaxis     6. Transfuse & replete electrolytes prn     7. IV hydration, daily weights, strict I&O, prn diuresis     8. PO intake as tolerated, nutrition follow up as needed, MVI, folic acid   On Nepro  Continue clears     9. Antiemetics, anti-diarrhea medications:   Reglan, Ativan    10. OOB as tolerated, physical therapy consult if needed     11. Monitor coags / fibrinogen 2x week, vitamin K as needed     12. Monitor closely for clinical changes, monitor for fevers     13. Emotional support provided, plan of care discussed and questions addressed     14. Patient education done regarding chemotherapy prep, plan of care, restrictions and discharge planning     15. Continue regular social work input     I have written the above note for Dr. Bentley who performed service with me in the room.   Janett Quezada NP-C ([phone])    I have seen and examined patient with NP, I agree with above note as scribed.

## 2019-04-13 LAB
ALBUMIN SERPL ELPH-MCNC: 2.9 G/DL — LOW (ref 3.3–5)
ALP SERPL-CCNC: 271 U/L — HIGH (ref 40–120)
ALT FLD-CCNC: 54 U/L — HIGH (ref 10–45)
ANION GAP SERPL CALC-SCNC: 11 MMOL/L — SIGNIFICANT CHANGE UP (ref 5–17)
AST SERPL-CCNC: 52 U/L — HIGH (ref 10–40)
BASOPHILS # BLD AUTO: 0 K/UL — SIGNIFICANT CHANGE UP (ref 0–0.2)
BASOPHILS NFR BLD AUTO: 0.1 % — SIGNIFICANT CHANGE UP (ref 0–2)
BILIRUB SERPL-MCNC: 0.2 MG/DL — SIGNIFICANT CHANGE UP (ref 0.2–1.2)
BUN SERPL-MCNC: 20 MG/DL — SIGNIFICANT CHANGE UP (ref 7–23)
CALCIUM SERPL-MCNC: 9.6 MG/DL — SIGNIFICANT CHANGE UP (ref 8.4–10.5)
CHLORIDE SERPL-SCNC: 105 MMOL/L — SIGNIFICANT CHANGE UP (ref 96–108)
CO2 SERPL-SCNC: 24 MMOL/L — SIGNIFICANT CHANGE UP (ref 22–31)
CREAT SERPL-MCNC: 0.94 MG/DL — SIGNIFICANT CHANGE UP (ref 0.5–1.3)
EOSINOPHIL # BLD AUTO: 0 K/UL — SIGNIFICANT CHANGE UP (ref 0–0.5)
EOSINOPHIL NFR BLD AUTO: 0.1 % — SIGNIFICANT CHANGE UP (ref 0–6)
GLUCOSE SERPL-MCNC: 136 MG/DL — HIGH (ref 70–99)
HCT VFR BLD CALC: 28.5 % — LOW (ref 34.5–45)
HGB BLD-MCNC: 8.7 G/DL — LOW (ref 11.5–15.5)
LDH SERPL L TO P-CCNC: 329 U/L — HIGH (ref 50–242)
LYMPHOCYTES # BLD AUTO: 0.9 K/UL — LOW (ref 1–3.3)
LYMPHOCYTES # BLD AUTO: 18.3 % — SIGNIFICANT CHANGE UP (ref 13–44)
MAGNESIUM SERPL-MCNC: 1.9 MG/DL — SIGNIFICANT CHANGE UP (ref 1.6–2.6)
MCHC RBC-ENTMCNC: 30.6 GM/DL — LOW (ref 32–36)
MCHC RBC-ENTMCNC: 31.3 PG — SIGNIFICANT CHANGE UP (ref 27–34)
MCV RBC AUTO: 102 FL — HIGH (ref 80–100)
MONOCYTES # BLD AUTO: 0.1 K/UL — SIGNIFICANT CHANGE UP (ref 0–0.9)
MONOCYTES NFR BLD AUTO: 2.1 % — SIGNIFICANT CHANGE UP (ref 2–14)
NEUTROPHILS # BLD AUTO: 4 K/UL — SIGNIFICANT CHANGE UP (ref 1.8–7.4)
NEUTROPHILS NFR BLD AUTO: 79.3 % — HIGH (ref 43–77)
PHOSPHATE SERPL-MCNC: 3.2 MG/DL — SIGNIFICANT CHANGE UP (ref 2.5–4.5)
PLATELET # BLD AUTO: 59 K/UL — LOW (ref 150–400)
POTASSIUM SERPL-MCNC: 4.8 MMOL/L — SIGNIFICANT CHANGE UP (ref 3.5–5.3)
POTASSIUM SERPL-SCNC: 4.8 MMOL/L — SIGNIFICANT CHANGE UP (ref 3.5–5.3)
PROT SERPL-MCNC: 5 G/DL — LOW (ref 6–8.3)
RBC # BLD: 2.79 M/UL — LOW (ref 3.8–5.2)
RBC # FLD: 17.7 % — HIGH (ref 10.3–14.5)
SODIUM SERPL-SCNC: 140 MMOL/L — SIGNIFICANT CHANGE UP (ref 135–145)
TACROLIMUS SERPL-MCNC: 5 NG/ML — SIGNIFICANT CHANGE UP
URATE SERPL-MCNC: 7.9 MG/DL — HIGH (ref 2.5–7)
WBC # BLD: 5 K/UL — SIGNIFICANT CHANGE UP (ref 3.8–10.5)
WBC # FLD AUTO: 5 K/UL — SIGNIFICANT CHANGE UP (ref 3.8–10.5)

## 2019-04-13 PROCEDURE — 99233 SBSQ HOSP IP/OBS HIGH 50: CPT

## 2019-04-13 RX ORDER — SUCRALFATE 1 G
1 TABLET ORAL
Qty: 0 | Refills: 0 | Status: DISCONTINUED | OUTPATIENT
Start: 2019-04-13 | End: 2019-04-14

## 2019-04-13 RX ADMIN — HYDROMORPHONE HYDROCHLORIDE 2 MILLIGRAM(S): 2 INJECTION INTRAMUSCULAR; INTRAVENOUS; SUBCUTANEOUS at 11:04

## 2019-04-13 RX ADMIN — TACROLIMUS 1 MILLIGRAM(S): 5 CAPSULE ORAL at 10:11

## 2019-04-13 RX ADMIN — Medication 10 MILLIGRAM(S): at 17:25

## 2019-04-13 RX ADMIN — Medication 25 MILLIGRAM(S): at 21:53

## 2019-04-13 RX ADMIN — URSODIOL 300 MILLIGRAM(S): 250 TABLET, FILM COATED ORAL at 06:17

## 2019-04-13 RX ADMIN — HYDROMORPHONE HYDROCHLORIDE 2 MILLIGRAM(S): 2 INJECTION INTRAMUSCULAR; INTRAVENOUS; SUBCUTANEOUS at 17:23

## 2019-04-13 RX ADMIN — Medication 125 MILLIGRAM(S): at 06:19

## 2019-04-13 RX ADMIN — PANTOPRAZOLE SODIUM 40 MILLIGRAM(S): 20 TABLET, DELAYED RELEASE ORAL at 06:17

## 2019-04-13 RX ADMIN — HYDROMORPHONE HYDROCHLORIDE 2 MILLIGRAM(S): 2 INJECTION INTRAMUSCULAR; INTRAVENOUS; SUBCUTANEOUS at 17:38

## 2019-04-13 RX ADMIN — TACROLIMUS 1 MILLIGRAM(S): 5 CAPSULE ORAL at 21:53

## 2019-04-13 RX ADMIN — Medication 100 MILLIGRAM(S): at 01:30

## 2019-04-13 RX ADMIN — Medication 5 MILLILITER(S): at 10:11

## 2019-04-13 RX ADMIN — Medication 5 MILLILITER(S): at 20:52

## 2019-04-13 RX ADMIN — Medication 1 GRAM(S): at 12:22

## 2019-04-13 RX ADMIN — VALGANCICLOVIR 450 MILLIGRAM(S): 450 TABLET, FILM COATED ORAL at 10:11

## 2019-04-13 RX ADMIN — Medication 125 MILLIGRAM(S): at 17:24

## 2019-04-13 RX ADMIN — SIMETHICONE 80 MILLIGRAM(S): 80 TABLET, CHEWABLE ORAL at 23:50

## 2019-04-13 RX ADMIN — Medication 10 MILLIGRAM(S): at 23:55

## 2019-04-13 RX ADMIN — Medication 1 MILLIGRAM(S): at 12:01

## 2019-04-13 RX ADMIN — Medication 1 GRAM(S): at 06:16

## 2019-04-13 RX ADMIN — HYDROMORPHONE HYDROCHLORIDE 2 MILLIGRAM(S): 2 INJECTION INTRAMUSCULAR; INTRAVENOUS; SUBCUTANEOUS at 06:17

## 2019-04-13 RX ADMIN — Medication 1 GRAM(S): at 17:24

## 2019-04-13 RX ADMIN — SIMETHICONE 80 MILLIGRAM(S): 80 TABLET, CHEWABLE ORAL at 06:17

## 2019-04-13 RX ADMIN — HYDROMORPHONE HYDROCHLORIDE 2 MILLIGRAM(S): 2 INJECTION INTRAMUSCULAR; INTRAVENOUS; SUBCUTANEOUS at 21:53

## 2019-04-13 RX ADMIN — ATOVAQUONE 750 MILLIGRAM(S): 750 SUSPENSION ORAL at 17:24

## 2019-04-13 RX ADMIN — SIMETHICONE 80 MILLIGRAM(S): 80 TABLET, CHEWABLE ORAL at 12:01

## 2019-04-13 RX ADMIN — Medication 125 MILLIGRAM(S): at 12:01

## 2019-04-13 RX ADMIN — Medication 1 GRAM(S): at 23:50

## 2019-04-13 RX ADMIN — ATOVAQUONE 750 MILLIGRAM(S): 750 SUSPENSION ORAL at 06:17

## 2019-04-13 RX ADMIN — Medication 1 GRAM(S): at 12:01

## 2019-04-13 RX ADMIN — Medication 5 MILLILITER(S): at 23:50

## 2019-04-13 RX ADMIN — LEVETIRACETAM 500 MILLIGRAM(S): 250 TABLET, FILM COATED ORAL at 17:25

## 2019-04-13 RX ADMIN — Medication 5 MILLILITER(S): at 12:00

## 2019-04-13 RX ADMIN — VALGANCICLOVIR 450 MILLIGRAM(S): 450 TABLET, FILM COATED ORAL at 21:53

## 2019-04-13 RX ADMIN — HYDROMORPHONE HYDROCHLORIDE 2 MILLIGRAM(S): 2 INJECTION INTRAMUSCULAR; INTRAVENOUS; SUBCUTANEOUS at 03:07

## 2019-04-13 RX ADMIN — HYDROMORPHONE HYDROCHLORIDE 2 MILLIGRAM(S): 2 INJECTION INTRAMUSCULAR; INTRAVENOUS; SUBCUTANEOUS at 02:23

## 2019-04-13 RX ADMIN — Medication 10 MILLIGRAM(S): at 01:30

## 2019-04-13 RX ADMIN — Medication 125 MILLIGRAM(S): at 23:50

## 2019-04-13 RX ADMIN — HYDROMORPHONE HYDROCHLORIDE 2 MILLIGRAM(S): 2 INJECTION INTRAMUSCULAR; INTRAVENOUS; SUBCUTANEOUS at 06:55

## 2019-04-13 RX ADMIN — Medication 10 MILLIGRAM(S): at 06:17

## 2019-04-13 RX ADMIN — URSODIOL 300 MILLIGRAM(S): 250 TABLET, FILM COATED ORAL at 17:24

## 2019-04-13 RX ADMIN — HYDROMORPHONE HYDROCHLORIDE 2 MILLIGRAM(S): 2 INJECTION INTRAMUSCULAR; INTRAVENOUS; SUBCUTANEOUS at 22:20

## 2019-04-13 RX ADMIN — Medication 100 MILLIGRAM(S): at 13:45

## 2019-04-13 RX ADMIN — Medication 5 MILLILITER(S): at 17:24

## 2019-04-13 RX ADMIN — SIMETHICONE 80 MILLIGRAM(S): 80 TABLET, CHEWABLE ORAL at 17:24

## 2019-04-13 RX ADMIN — LEVETIRACETAM 500 MILLIGRAM(S): 250 TABLET, FILM COATED ORAL at 06:17

## 2019-04-13 RX ADMIN — HYDROMORPHONE HYDROCHLORIDE 2 MILLIGRAM(S): 2 INJECTION INTRAMUSCULAR; INTRAVENOUS; SUBCUTANEOUS at 11:19

## 2019-04-13 RX ADMIN — Medication 10 MILLIGRAM(S): at 12:01

## 2019-04-13 RX ADMIN — PANTOPRAZOLE SODIUM 40 MILLIGRAM(S): 20 TABLET, DELAYED RELEASE ORAL at 17:25

## 2019-04-13 RX ADMIN — CHLORHEXIDINE GLUCONATE 1 APPLICATION(S): 213 SOLUTION TOPICAL at 06:19

## 2019-04-13 RX ADMIN — Medication 1 TABLET(S): at 12:01

## 2019-04-13 NOTE — PROGRESS NOTE ADULT - ATTENDING COMMENTS
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  cmv pos..ct with vague findings in pancreas..amylase and lipase nl..unclear etiology of upper GI discomfort...was improving slowly..now appears worse....cellcept stopped b/c of falling counts....abd discomfort....ct noted....?? findings related to virus vs gvhd.....on valcyte 450 bid....cbc improved..cmv pcr down overall..stable since 4/5  non-neutropenic on px abx  on empiric po vanco for cdiff....being tapered slowly   Tacro level 2.71 on 4/5, repeat level on 4/8 was adequate..repeat level in am  cont tacro 1 mg bid  was on MMF 1G PO BID....stopped on 4/4...?? gvhd...grade 1 upper..overall grade 2...started  solumedrol 100 mg bid..try to taper on monday to 90 mg...advance to full diet in am  Most recent FISH for Y on  100% donor...repeat cmv pcr planned for twice weekly  cont valcyte 450 bid  Continue Actigall for VOD prevention  Continue Keppra for h/o seizures  Contnue Fluconazole for antifungal ppx  Continue Mepron for PCP ppx  Continue supportive care.. 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  cmv pos..ct with vague findings in pancreas..amylase and lipase nl..unclear etiology of upper GI discomfort...was improving slowly..now appears worse....cellcept stopped b/c of falling counts....abd discomfort....ct noted....?? findings related to virus vs gvhd.....on valcyte 450 bid....cbc improved..cmv pcr down overall..stable since 4/5  non-neutropenic on px abx  on empiric po vanco for cdiff....being tapered slowly   Tacro level therapeutic, cont tacro 1 mg bid  was on MMF 1G PO BID....stopped on 4/4...?? gvhd...grade 1 upper..overall grade 2...started  solumedrol 100 mg bid..try to taper on monday to 90 mg...advance to full diet tomorrow.  Most recent FISH for Y on  100% donor.  repeat cmv pcr planned for twice weekly  cont valcyte 450 bid  Continue Actigall for VOD prevention  Continue Keppra for h/o seizures  Continue Mepron for PCP ppx  Continue supportive care.. possible d/c end of  week  On dilaudid 2 mg q4hr   Continue carafate, protonix.

## 2019-04-13 NOTE — PROGRESS NOTE ADULT - SUBJECTIVE AND OBJECTIVE BOX
HPC Transplant Team                                                      Critical / Counseling Time Provided: 30 minutes  Chief Complaint: hypotension, elevated WBC    S: Patient seen and examined with [facility] Transplant Team:   + chronic back pain  + abdominal pain   + intermittent nausea    Denies mouth / tongue / throat pain, dyspnea, cough, vomiting, diarrhea      O: Vitals:   Vital Signs Last 24 Hrs  T(C): 35.8 (2019 05:57), Max: 36.8 (2019 17:33)  T(F): 96.5 (2019 05:57), Max: 98.3 (2019 17:33)  HR: 77 (2019 05:57) (77 - 103)  BP: 125/73 (2019 05:57) (113/71 - 137/91)  BP(mean): --  RR: 18 (2019 05:57) (18 - 18)  SpO2: 100% (2019 05:57) (95% - 100%)    Admit weight: 104.3 kg    Daily     Daily Weight in k (2019 09:15)    Intake / Output:   -12 @ 07:01  -  -13 @ 07:00  --------------------------------------------------------  IN: 1348 mL / OUT: 800 mL / NET: 548 mL          PE:   Oropharynx: Clear  Oral Mucositis:   clear                                                     Grade:   CVS: RRR  Lungs: CTA  Abdomen: + BS, soft, tenderness to palpation epigastric region  Gastric Mucositis:   -                                               Grade:   Intestinal Mucositis:   -                                           Grade:   Extremities: +1 bilateral LE edema  Skin: No rash  Line: PIV  Neuro: Awake alert and oriented  Pain: moderate abdominal pain 5/10          Labs:   CBC Full  -  ( 2019 06:50 )  WBC Count : 5.0 K/uL  Hemoglobin : 8.7 g/dL  Hematocrit : 28.5 %  Platelet Count - Automated : 59 K/uL  Mean Cell Volume : 102.0 fl  Mean Cell Hemoglobin : 31.3 pg  Mean Cell Hemoglobin Concentration : 30.6 gm/dL  Auto Neutrophil # : 4.0 K/uL  Auto Lymphocyte # : 0.9 K/uL  Auto Monocyte # : 0.1 K/uL  Auto Eosinophil # : 0.0 K/uL  Auto Basophil # : 0.0 K/uL  Auto Neutrophil % : 79.3 %  Auto Lymphocyte % : 18.3 %  Auto Monocyte % : 2.1 %  Auto Eosinophil % : 0.1 %  Auto Basophil % : 0.1 %                          8.7    5.0   )-----------( 59       ( 2019 06:50 )             28.5     04-13    140  |  105  |  20  ----------------------------<  136<H>  4.8   |  24  |  0.94    Ca    9.6      2019 06:49  Phos  3.2       Mg     1.9         TPro  5.0<L>  /  Alb  2.9<L>  /  TBili  0.2  /  DBili  x   /  AST  52<H>  /  ALT  54<H>  /  AlkPhos  271<H>      PT/INR - ( 2019 09:58 )   PT: 11.2 sec;   INR: 0.98 ratio         PTT - ( 2019 09:58 )  PTT:33.3 sec  LIVER FUNCTIONS - ( 2019 06:49 )  Alb: 2.9 g/dL / Pro: 5.0 g/dL / ALK PHOS: 271 U/L / ALT: 54 U/L / AST: 52 U/L / GGT: x           Lactate Dehydrogenase, Serum: 329 U/L ( @ 06:49)  Lactate Dehydrogenase, Serum: 390 U/L ( @ 09:07)          Karnofsky / Lansky Scale:   GVHD:   Skin:   Liver:   Gut:   Overall Grade:       Cultures:         Radiology:       Meds:   Antimicrobials:   atovaquone Suspension 750 milliGRAM(s) Oral two times a day  valGANciclovir 450 milliGRAM(s) Oral two times a day  vancomycin    Solution 125 milliGRAM(s) Oral every 6 hours      Heme / Onc:       GI:  metoclopramide 10 milliGRAM(s) Oral every 6 hours  pantoprazole    Tablet 40 milliGRAM(s) Oral every 12 hours  simethicone 80 milliGRAM(s) Chew every 6 hours  sucralfate suspension 1 Gram(s) Oral every 6 hours  ursodiol Capsule 300 milliGRAM(s) Oral two times a day      Cardiovascular:       Immunologic:   tacrolimus 1 milliGRAM(s) Oral <User Schedule>      Other medications:   amitriptyline 25 milliGRAM(s) Oral at bedtime  Biotene Dry Mouth Oral Rinse 5 milliLiter(s) Swish and Spit five times a day  chlorhexidine 4% Liquid 1 Application(s) Topical <User Schedule>  folic acid 1 milliGRAM(s) Oral daily  levETIRAcetam 500 milliGRAM(s) Oral two times a day  methylPREDNISolone sodium succinate Injectable 100 milliGRAM(s) IV Push every 12 hours  multivitamin 1 Tablet(s) Oral daily      PRN:   HYDROmorphone  Injectable 2 milliGRAM(s) IV Push every 4 hours PRN  ondansetron    Tablet 8 milliGRAM(s) Oral three times a day PRN  ondansetron Injectable 8 milliGRAM(s) IV Push every 6 hours PRN  sodium chloride 0.9% lock flush 10 milliLiter(s) IV Push every 1 hour PRN  A/P:   59 year old F with a history of relapsed ALL  Status Post Allogeneic PBSCT 2019, 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 Amylase and lipase WNL, continue clears   s/p Calorie count x 72 hours (4/3-), reglan IVP pre-meals  Discontinued Cefepime and MMF on 4/3, continue on Tacro.    Follow up repeat CMV PCR - pending   Tacro therapeutic, follow up level in am   Follow-up ferritin improving  FISH for Y pending from    Transaminitis grade1 most likely due to antifungals:  continue to monitor and hold azoles  Calorie count 40% of adequate intake.   Abdominal x-ray for worsening pain unremarkable.  GI consult today.  Repeat amylase/lipase WNL.  Start carafate/reglan/mylicon/IV Protonix for presumed gastritis.  repeat CT with oral and IV contrast c/w ascitics, likely  related to virus vs gvhd, started solumedrol 100 mg IV BID   PICC placed    1. Infectious Disease:   atovaquone Suspension 750 milliGRAM(s) Oral two times a day  valGANciclovir 450 milliGRAM(s) Oral two times a day  vancomycin    Solution 125 milliGRAM(s) Oral every 6 hours    2. VOD Prophylaxis: Actigall, Glutamine    3. GI Prophylaxis:  Protonix    4. Mouthcare - NS / NaHCO3 rinses, Mycelex, Biotene, skin care     5. GVHD prophylaxis     6. Transfuse & replete electrolytes prn     7. IV hydration, daily weights, strict I&O, prn diuresis     8. PO intake as tolerated, nutrition follow up as needed, MVI, folic acid   On Nepro  Continue clears     9. Antiemetics, anti-diarrhea medications:   Reglan, Ativan    10. OOB as tolerated, physical therapy consult if needed     11. Monitor coags / fibrinogen 2x week, vitamin K as needed     12. Monitor closely for clinical changes, monitor for fevers     13. Emotional support provided, plan of care discussed and questions addressed     14. Patient education done regarding chemotherapy prep, plan of care, restrictions and discharge planning     15. Continue regular social work input     I have written the above note for Dr. Hernandes  who performed service with me in the room.   Yolis Dunaway NP-C ([phone])    I have seen and examined patient with NP, I agree with above note as scribed. HPC Transplant Team                                                      Critical / Counseling Time Provided: 30 minutes  Chief Complaint: hypotension, elevated WBC    S: Patient seen and examined with [facility] Transplant Team:   + chronic back pain  + abdominal pain- epigastric/hypogastric area.  No n/v, tolerating clear liquids.    Denies mouth / tongue / throat pain, dyspnea, cough, vomiting, diarrhea      O: Vitals:   Vital Signs Last 24 Hrs  T(C): 35.8 (2019 05:57), Max: 36.8 (2019 17:33)  T(F): 96.5 (2019 05:57), Max: 98.3 (2019 17:33)  HR: 77 (2019 05:57) (77 - 103)  BP: 125/73 (2019 05:57) (113/71 - 137/91)  BP(mean): --  RR: 18 (2019 05:57) (18 - 18)  SpO2: 100% (2019 05:57) (95% - 100%)    Admit weight: 104.3 kg    Daily     Daily Weight in k (2019 09:15)    Intake / Output:   -12 @ 07:01  -  -13 @ 07:00  --------------------------------------------------------  IN: 1348 mL / OUT: 800 mL / NET: 548 mL          PE:   Oropharynx: Clear  Oral Mucositis:   clear                                                     Grade:   CVS: RRR  Lungs: CTA  Abdomen: + BS, soft, tenderness to palpation epigastric region  Gastric Mucositis:   -                                               Grade:   Intestinal Mucositis:   -                                           Grade:   Extremities: +1 bilateral LE edema  Skin: No rash  Line: PIV  Neuro: Awake alert and oriented  Pain: moderate abdominal pain 5/10          Labs:   CBC Full  -  ( 2019 06:50 )  WBC Count : 5.0 K/uL  Hemoglobin : 8.7 g/dL  Hematocrit : 28.5 %  Platelet Count - Automated : 59 K/uL  Mean Cell Volume : 102.0 fl  Mean Cell Hemoglobin : 31.3 pg  Mean Cell Hemoglobin Concentration : 30.6 gm/dL  Auto Neutrophil # : 4.0 K/uL  Auto Lymphocyte # : 0.9 K/uL  Auto Monocyte # : 0.1 K/uL  Auto Eosinophil # : 0.0 K/uL  Auto Basophil # : 0.0 K/uL  Auto Neutrophil % : 79.3 %  Auto Lymphocyte % : 18.3 %  Auto Monocyte % : 2.1 %  Auto Eosinophil % : 0.1 %  Auto Basophil % : 0.1 %                          8.7    5.0   )-----------( 59       ( 2019 06:50 )             28.5         140  |  105  |  20  ----------------------------<  136<H>  4.8   |  24  |  0.94    Ca    9.6      2019 06:49  Phos  3.2       Mg     1.9         TPro  5.0<L>  /  Alb  2.9<L>  /  TBili  0.2  /  DBili  x   /  AST  52<H>  /  ALT  54<H>  /  AlkPhos  271<H>      PT/INR - ( 2019 09:58 )   PT: 11.2 sec;   INR: 0.98 ratio         PTT - ( 2019 09:58 )  PTT:33.3 sec  LIVER FUNCTIONS - ( 2019 06:49 )  Alb: 2.9 g/dL / Pro: 5.0 g/dL / ALK PHOS: 271 U/L / ALT: 54 U/L / AST: 52 U/L / GGT: x           Lactate Dehydrogenase, Serum: 329 U/L ( @ 06:49)  Lactate Dehydrogenase, Serum: 390 U/L ( @ 09:07)          Karnofsky / Lansky Scale:   GVHD:   Skin:   Liver:   Gut:   Overall Grade:       Cultures:         Radiology:       Meds:   Antimicrobials:   atovaquone Suspension 750 milliGRAM(s) Oral two times a day  valGANciclovir 450 milliGRAM(s) Oral two times a day  vancomycin    Solution 125 milliGRAM(s) Oral every 6 hours      Heme / Onc:       GI:  metoclopramide 10 milliGRAM(s) Oral every 6 hours  pantoprazole    Tablet 40 milliGRAM(s) Oral every 12 hours  simethicone 80 milliGRAM(s) Chew every 6 hours  sucralfate suspension 1 Gram(s) Oral every 6 hours  ursodiol Capsule 300 milliGRAM(s) Oral two times a day      Cardiovascular:       Immunologic:   tacrolimus 1 milliGRAM(s) Oral <User Schedule>      Other medications:   amitriptyline 25 milliGRAM(s) Oral at bedtime  Biotene Dry Mouth Oral Rinse 5 milliLiter(s) Swish and Spit five times a day  chlorhexidine 4% Liquid 1 Application(s) Topical <User Schedule>  folic acid 1 milliGRAM(s) Oral daily  levETIRAcetam 500 milliGRAM(s) Oral two times a day  methylPREDNISolone sodium succinate Injectable 100 milliGRAM(s) IV Push every 12 hours  multivitamin 1 Tablet(s) Oral daily      PRN:   HYDROmorphone  Injectable 2 milliGRAM(s) IV Push every 4 hours PRN  ondansetron    Tablet 8 milliGRAM(s) Oral three times a day PRN  ondansetron Injectable 8 milliGRAM(s) IV Push every 6 hours PRN  sodium chloride 0.9% lock flush 10 milliLiter(s) IV Push every 1 hour PRN  A/P:   59 year old F with a history of relapsed ALL  Status Post Allogeneic PBSCT 2019, 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 Amylase and lipase WNL, continue clears   s/p Calorie count x 72 hours (4/3-), reglan IVP pre-meals  Discontinued Cefepime and MMF on 4/3, continue on Tacro.    Follow up repeat CMV PCR - pending   Tacro therapeutic, follow up level in am   Follow-up ferritin improving  FISH for Y pending from    Transaminitis grade1 most likely due to antifungals:  continue to monitor and hold azoles  Calorie count 40% of adequate intake.   Abdominal x-ray for worsening pain unremarkable.  GI consult today.  Repeat amylase/lipase WNL.  Start carafate/reglan/mylicon/IV Protonix for presumed gastritis.  repeat CT with oral and IV contrast c/w ascitics, likely  related to virus vs gvhd, started solumedrol 100 mg IV BID   PICC placed    1. Infectious Disease:   atovaquone Suspension 750 milliGRAM(s) Oral two times a day  valGANciclovir 450 milliGRAM(s) Oral two times a day  vancomycin    Solution 125 milliGRAM(s) Oral every 6 hours    2. VOD Prophylaxis: Actigall, Glutamine    3. GI Prophylaxis:  Protonix    4. Mouthcare - NS / NaHCO3 rinses, Mycelex, Biotene, skin care     5. GVHD prophylaxis     6. Transfuse & replete electrolytes prn     7. IV hydration, daily weights, strict I&O, prn diuresis     8. PO intake as tolerated, nutrition follow up as needed, MVI, folic acid   On Nepro  Continue clears     9. Antiemetics, anti-diarrhea medications:   Reglan, Ativan    10. OOB as tolerated, physical therapy consult if needed     11. Monitor coags / fibrinogen 2x week, vitamin K as needed     12. Monitor closely for clinical changes, monitor for fevers     13. Emotional support provided, plan of care discussed and questions addressed     14. Patient education done regarding chemotherapy prep, plan of care, restrictions and discharge planning     15. Continue regular social work input     I have written the above note for Dr. Hernandes  who performed service with me in the room.   Yolis Dunaway NP-C ([phone])    I have seen and examined patient with NP, I agree with above note as scribed. HPC Transplant Team                                                      Critical / Counseling Time Provided: 30 minutes  Chief Complaint: hypotension, elevated WBC    S: Patient seen and examined with [facility] Transplant Team:     No complaint   Denies mouth / tongue / throat pain, dyspnea, cough, vomiting, diarrhea      O: Vitals:   Vital Signs Last 24 Hrs  T(C): 35.8 (2019 05:57), Max: 36.8 (2019 17:33)  T(F): 96.5 (2019 05:57), Max: 98.3 (2019 17:33)  HR: 77 (2019 05:57) (77 - 103)  BP: 125/73 (2019 05:57) (113/71 - 137/91)  BP(mean): --  RR: 18 (2019 05:57) (18 - 18)  SpO2: 100% (2019 05:57) (95% - 100%)    Admit weight: 104.3 kg    Daily  108.5 kg   Daily Weight in k (2019 09:15)    Intake / Output:   -12 @ 07:01  -  13 @ 07:00  --------------------------------------------------------  IN: 1348 mL / OUT: 800 mL / NET: 548 mL    PE:   Oropharynx: Clear  Oral Mucositis:   clear                                                     Grade:   CVS: RRR  Lungs: CTA  Abdomen: + BS, soft, mild  tenderness to palpation epigastric region  Gastric Mucositis:   -                                               Grade:   Intestinal Mucositis:   -                                           Grade:   Extremities: +1 bilateral LE edema  Skin: No rash  Line: PIV  Neuro: Awake alert and oriented  Pain: 0/10    Labs:   CBC Full  -  ( 2019 06:50 )  WBC Count : 5.0 K/uL  Hemoglobin : 8.7 g/dL  Hematocrit : 28.5 %  Platelet Count - Automated : 59 K/uL  Mean Cell Volume : 102.0 fl  Mean Cell Hemoglobin : 31.3 pg  Mean Cell Hemoglobin Concentration : 30.6 gm/dL  Auto Neutrophil # : 4.0 K/uL  Auto Lymphocyte # : 0.9 K/uL  Auto Monocyte # : 0.1 K/uL  Auto Eosinophil # : 0.0 K/uL  Auto Basophil # : 0.0 K/uL  Auto Neutrophil % : 79.3 %  Auto Lymphocyte % : 18.3 %  Auto Monocyte % : 2.1 %  Auto Eosinophil % : 0.1 %  Auto Basophil % : 0.1 %                          8.7    5.0   )-----------( 59       ( 2019 06:50 )             28.5         140  |  105  |  20  ----------------------------<  136<H>  4.8   |  24  |  0.94    Ca    9.6      2019 06:49  Phos  3.2       Mg     1.9         TPro  5.0<L>  /  Alb  2.9<L>  /  TBili  0.2  /  DBili  x   /  AST  52<H>  /  ALT  54<H>  /  AlkPhos  271<H>      PT/INR - ( 2019 09:58 )   PT: 11.2 sec;   INR: 0.98 ratio         PTT - ( 2019 09:58 )  PTT:33.3 sec  LIVER FUNCTIONS - ( 2019 06:49 )  Alb: 2.9 g/dL / Pro: 5.0 g/dL / ALK PHOS: 271 U/L / ALT: 54 U/L / AST: 52 U/L / GGT: x           Lactate Dehydrogenase, Serum: 329 U/L ( @ 06:49)    Tacrolimus (), Serum (19 @ 09:24)    Tacrolimus (), Serum: 5.0: Tacrolimus testing is performed on the Abbott "[employer]" by  chemiluminescent microparticle immunoassay. The therapeutic range of  tacrolimus is not clearly defined but target 12-hour trough whole blood  concentrations are 5.0 - 20.0 ng/mL early post transplant. Twenty-four  hour  trough concentrations are 33-50% less than the corresponding 12-hour  trough. ng/mL      Cytomegalovirus By PCR (19 @ 13:18)    CMVPCR Lo.29: Assay lower limit of detection (LOD):  31.2 IU/mL (1.49 log10/mL)  Assay dynamic range:  Cytogenetics Test (19 @ 11:58)    FISH Interpretation:   Fluorescence in situ Hybridization (FISH) Report  _______________________________________________________________    Lab Accession Number: [id number]  Indication: S/P Sex mis-matched bone marrow transplant evalaution  Date Collected: 2019 11:00  Date Received: 2019 11:58  Date Reported: 04/10/2019 14:31  Specimen Type: Peripheral Blood  Test Requested: FISH Analysis  ________________________________________________________________  FISH Result: POSITIVE FOR  DONOR (XY) GENOTYPEIN 100% OF CELLS    Probe(s) and Location(s): CEP X(DXZ1)/CEP Y(DYZ1)(Xp11.1-q11.1/Yq12)    ISCN Nomenclature: //nuc lamin(DXZ1,DYZ1)x1200    _______________________________________________________________________________    Findings and Interpretation:  Fluorescence in situ hybridization (FISH) analysis was performed on this patient's specimen using  probes from [employer] as described above.  Two hundred interphase nuclei were examined for  each probe.  The signal patterns obtained revealed no cells of host (XX) origin and all cells of donor (XY)  origin.  Based on the limits of this technology, the engraftment status of this specimen is all donor cells.  Recommendation:  Correlation with results from standard cytogenetic analysis, aswell as hematopathological and  clinical findings and/or other relevant tests is suggested for complete interpretation of the  results.  Please see previous cytogenetic and fluorescence in situ hybridization (FISH) reports on this  patient for additional information.    Comments:  This FISH analysis is limited to abnormalities detectable by the specific probes included in the  study.  These results do not reflect other cytogenetic changes that may be observed by standard  chromosome analysis.    Disclaimer:  FISH analysis was performed using analyte specific reagents (ASRs).  This test was developed and  its performance determined by the [facility], [place]  [address].  It has not been cleared or approved by the U.S. Food and Drug Administration (FDA).  The FDA has determined that such clearance or approval is not necessary.  This test is used for  clinical purposes.  It should not be regarded as investigational or for research.  This laboratory  is certified under the Clinical Laboratory Improvement Amendments of 1988 (CLIA-88) as qualified to  perform high complexity clinical laboratory testing.  Preliminary Report:  No  Pathologist: Mathew Ramirez MD    Verified By: Cytogeneticist : Amy Rodriguez, PhD, [facility]  (Electronic Signature)    50 to 156,000,000 (156M) CMV DNA IU/mL (1.70 log10/mL – 8.19 log10/mL)  Plasma CMV DNA Quantification by PCR using Abbott m2000:  The results of this test shouldbe interpreted with consideration of all  clinical and laboratory findings. In particular, caution should be used  when interpreting low level positive results when the test is used for  diagnostic purposes. Repeat testing on an additional sample is  recommended to confirm low level positive results. A result of "Not  Detected" does not preclude the possibility of infection with CMV.  CMV  DNA may be present below the detection limit of assay. LogIU/mL      Radiology:     < from: CT Abdomen and Pelvis w/ Oral Cont (04.10.19 @ 17:54) >  IMPRESSION:   *   Trace bilateral pleural effusion, abdominal pelvic ascites,   subcutaneous edema, and edema of the root of the mesentery. Correlate   with the patient's fluid and oncotic status.  *  Wall thickening of the right is nonspecific, and may be related to the   patient's fluid status. Colitis is not excluded.        Meds:   Antimicrobials:   atovaquone Suspension 750 milliGRAM(s) Oral two times a day  valGANciclovir 450 milliGRAM(s) Oral two times a day  vancomycin    Solution 125 milliGRAM(s) Oral every 6 hours      GI:  metoclopramide 10 milliGRAM(s) Oral every 6 hours  pantoprazole    Tablet 40 milliGRAM(s) Oral every 12 hours  simethicone 80 milliGRAM(s) Chew every 6 hours  sucralfate suspension 1 Gram(s) Oral every 6 hours  ursodiol Capsule 300 milliGRAM(s) Oral two times a day    Immunologic:   tacrolimus 1 milliGRAM(s) Oral <User Schedule>      Other medications:   amitriptyline 25 milliGRAM(s) Oral at bedtime  Biotene Dry Mouth Oral Rinse 5 milliLiter(s) Swish and Spit five times a day  chlorhexidine 4% Liquid 1 Application(s) Topical <User Schedule>  folic acid 1 milliGRAM(s) Oral daily  levETIRAcetam 500 milliGRAM(s) Oral two times a day  methylPREDNISolone sodium succinate Injectable 100 milliGRAM(s) IV Push every 12 hours  multivitamin 1 Tablet(s) Oral daily      PRN:   HYDROmorphone  Injectable 2 milliGRAM(s) IV Push every 4 hours PRN  ondansetron    Tablet 8 milliGRAM(s) Oral three times a day PRN  ondansetron Injectable 8 milliGRAM(s) IV Push every 6 hours PRN  sodium chloride 0.9% lock flush 10 milliLiter(s) IV Push every 1 hour PRN      A/P:   59 year old F with a history of relapsed ALL  Status Post Allogeneic PBSCT 2019, 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 Amylase and lipase WNL, continue clears   s/p Calorie count x 72 hours (4/3-), reglan IVP pre-meals  Discontinued Cefepime and MMF on 4/3, continue on Tacro.    Follow up repeat CMV PCR - pending   Tacro therapeutic, follow up level in am   Follow-up ferritin improving  FISH for Y pending from    Transaminitis grade1 most likely due to antifungals:  continue to monitor and hold azoles  Calorie count 40% of adequate intake.   Abdominal x-ray for worsening pain unremarkable.  GI consult today.  Repeat amylase/lipase WNL.  Start carafate/reglan/mylicon/IV Protonix for presumed gastritis.  repeat CT with oral and IV contrast c/w ascitics, likely  related to virus vs gvhd, started solumedrol 100 mg IV BID   PICC placed    1. Infectious Disease:   atovaquone Suspension 750 milliGRAM(s) Oral two times a day  valGANciclovir 450 milliGRAM(s) Oral two times a day  vancomycin    Solution 125 milliGRAM(s) Oral every 6 hours    2. VOD Prophylaxis: Actigall, Glutamine    3. GI Prophylaxis:  Protonix    4. Mouthcare - NS / NaHCO3 rinses, Mycelex, Biotene, skin care     5. GVHD prophylaxis   Continue Tacro 1mg bid    6. Transfuse & replete electrolytes prn   Will repeat uric acid in am    7. IV hydration, daily weights, strict I&O, prn diuresis     8. PO intake as tolerated, nutrition follow up as needed, MVI, folic acid   Continue Full Liquid; Ensure Enlive BID     9. Antiemetics, anti-diarrhea medications:   Reglan, Ativan    10. OOB as tolerated, physical therapy consult if needed     11. Monitor coags / fibrinogen 2x week, vitamin K as needed     12. Monitor closely for clinical changes, monitor for fevers     13. Emotional support provided, plan of care discussed and questions addressed     14. Patient education done regarding chemotherapy prep, plan of care, restrictions and discharge planning     15. Continue regular social work input     I have written the above note for Dr. Hernandes  who performed service with me in the room.   Yolis Dunaway NP-C ([phone])    I have seen and examined patient with NP, I agree with above note as scribed.

## 2019-04-14 LAB
ALBUMIN SERPL ELPH-MCNC: 2.9 G/DL — LOW (ref 3.3–5)
ALP SERPL-CCNC: 251 U/L — HIGH (ref 40–120)
ALT FLD-CCNC: 53 U/L — HIGH (ref 10–45)
ANION GAP SERPL CALC-SCNC: 10 MMOL/L — SIGNIFICANT CHANGE UP (ref 5–17)
AST SERPL-CCNC: 49 U/L — HIGH (ref 10–40)
BASOPHILS # BLD AUTO: 0 K/UL — SIGNIFICANT CHANGE UP (ref 0–0.2)
BASOPHILS NFR BLD AUTO: 0.4 % — SIGNIFICANT CHANGE UP (ref 0–2)
BILIRUB SERPL-MCNC: 0.2 MG/DL — SIGNIFICANT CHANGE UP (ref 0.2–1.2)
BUN SERPL-MCNC: 19 MG/DL — SIGNIFICANT CHANGE UP (ref 7–23)
CALCIUM SERPL-MCNC: 9.7 MG/DL — SIGNIFICANT CHANGE UP (ref 8.4–10.5)
CHLORIDE SERPL-SCNC: 109 MMOL/L — HIGH (ref 96–108)
CO2 SERPL-SCNC: 24 MMOL/L — SIGNIFICANT CHANGE UP (ref 22–31)
CREAT SERPL-MCNC: 1.02 MG/DL — SIGNIFICANT CHANGE UP (ref 0.5–1.3)
EOSINOPHIL # BLD AUTO: 0 K/UL — SIGNIFICANT CHANGE UP (ref 0–0.5)
EOSINOPHIL NFR BLD AUTO: 0.5 % — SIGNIFICANT CHANGE UP (ref 0–6)
GLUCOSE SERPL-MCNC: 119 MG/DL — HIGH (ref 70–99)
HCT VFR BLD CALC: 28.1 % — LOW (ref 34.5–45)
HGB BLD-MCNC: 8.7 G/DL — LOW (ref 11.5–15.5)
LDH SERPL L TO P-CCNC: 302 U/L — HIGH (ref 50–242)
LYMPHOCYTES # BLD AUTO: 0.7 K/UL — LOW (ref 1–3.3)
LYMPHOCYTES # BLD AUTO: 13.7 % — SIGNIFICANT CHANGE UP (ref 13–44)
MAGNESIUM SERPL-MCNC: 1.9 MG/DL — SIGNIFICANT CHANGE UP (ref 1.6–2.6)
MCHC RBC-ENTMCNC: 30.8 GM/DL — LOW (ref 32–36)
MCHC RBC-ENTMCNC: 31.8 PG — SIGNIFICANT CHANGE UP (ref 27–34)
MCV RBC AUTO: 103 FL — HIGH (ref 80–100)
MONOCYTES # BLD AUTO: 0.1 K/UL — SIGNIFICANT CHANGE UP (ref 0–0.9)
MONOCYTES NFR BLD AUTO: 2.6 % — SIGNIFICANT CHANGE UP (ref 2–14)
NEUTROPHILS # BLD AUTO: 4 K/UL — SIGNIFICANT CHANGE UP (ref 1.8–7.4)
NEUTROPHILS NFR BLD AUTO: 82.8 % — HIGH (ref 43–77)
PHOSPHATE SERPL-MCNC: 2.8 MG/DL — SIGNIFICANT CHANGE UP (ref 2.5–4.5)
PLATELET # BLD AUTO: 62 K/UL — LOW (ref 150–400)
POTASSIUM SERPL-MCNC: 4.7 MMOL/L — SIGNIFICANT CHANGE UP (ref 3.5–5.3)
POTASSIUM SERPL-SCNC: 4.7 MMOL/L — SIGNIFICANT CHANGE UP (ref 3.5–5.3)
PROT SERPL-MCNC: 4.9 G/DL — LOW (ref 6–8.3)
RBC # BLD: 2.73 M/UL — LOW (ref 3.8–5.2)
RBC # FLD: 17.6 % — HIGH (ref 10.3–14.5)
SODIUM SERPL-SCNC: 143 MMOL/L — SIGNIFICANT CHANGE UP (ref 135–145)
URATE SERPL-MCNC: 7.2 MG/DL — HIGH (ref 2.5–7)
WBC # BLD: 4.9 K/UL — SIGNIFICANT CHANGE UP (ref 3.8–10.5)
WBC # FLD AUTO: 4.9 K/UL — SIGNIFICANT CHANGE UP (ref 3.8–10.5)

## 2019-04-14 PROCEDURE — 99233 SBSQ HOSP IP/OBS HIGH 50: CPT

## 2019-04-14 RX ADMIN — Medication 1 GRAM(S): at 17:34

## 2019-04-14 RX ADMIN — Medication 125 MILLIGRAM(S): at 17:34

## 2019-04-14 RX ADMIN — PANTOPRAZOLE SODIUM 40 MILLIGRAM(S): 20 TABLET, DELAYED RELEASE ORAL at 06:10

## 2019-04-14 RX ADMIN — HYDROMORPHONE HYDROCHLORIDE 2 MILLIGRAM(S): 2 INJECTION INTRAMUSCULAR; INTRAVENOUS; SUBCUTANEOUS at 20:49

## 2019-04-14 RX ADMIN — PANTOPRAZOLE SODIUM 40 MILLIGRAM(S): 20 TABLET, DELAYED RELEASE ORAL at 17:34

## 2019-04-14 RX ADMIN — SIMETHICONE 80 MILLIGRAM(S): 80 TABLET, CHEWABLE ORAL at 12:13

## 2019-04-14 RX ADMIN — Medication 25 MILLIGRAM(S): at 21:38

## 2019-04-14 RX ADMIN — Medication 1 TABLET(S): at 12:14

## 2019-04-14 RX ADMIN — SIMETHICONE 80 MILLIGRAM(S): 80 TABLET, CHEWABLE ORAL at 06:10

## 2019-04-14 RX ADMIN — VALGANCICLOVIR 450 MILLIGRAM(S): 450 TABLET, FILM COATED ORAL at 21:38

## 2019-04-14 RX ADMIN — Medication 1 MILLIGRAM(S): at 12:14

## 2019-04-14 RX ADMIN — Medication 10 MILLIGRAM(S): at 12:13

## 2019-04-14 RX ADMIN — HYDROMORPHONE HYDROCHLORIDE 2 MILLIGRAM(S): 2 INJECTION INTRAMUSCULAR; INTRAVENOUS; SUBCUTANEOUS at 02:50

## 2019-04-14 RX ADMIN — Medication 1 GRAM(S): at 12:13

## 2019-04-14 RX ADMIN — HYDROMORPHONE HYDROCHLORIDE 2 MILLIGRAM(S): 2 INJECTION INTRAMUSCULAR; INTRAVENOUS; SUBCUTANEOUS at 21:10

## 2019-04-14 RX ADMIN — URSODIOL 300 MILLIGRAM(S): 250 TABLET, FILM COATED ORAL at 06:22

## 2019-04-14 RX ADMIN — Medication 5 MILLILITER(S): at 12:13

## 2019-04-14 RX ADMIN — Medication 100 MILLIGRAM(S): at 13:30

## 2019-04-14 RX ADMIN — VALGANCICLOVIR 450 MILLIGRAM(S): 450 TABLET, FILM COATED ORAL at 10:14

## 2019-04-14 RX ADMIN — SIMETHICONE 80 MILLIGRAM(S): 80 TABLET, CHEWABLE ORAL at 17:34

## 2019-04-14 RX ADMIN — HYDROMORPHONE HYDROCHLORIDE 2 MILLIGRAM(S): 2 INJECTION INTRAMUSCULAR; INTRAVENOUS; SUBCUTANEOUS at 02:24

## 2019-04-14 RX ADMIN — ATOVAQUONE 750 MILLIGRAM(S): 750 SUSPENSION ORAL at 06:10

## 2019-04-14 RX ADMIN — LEVETIRACETAM 500 MILLIGRAM(S): 250 TABLET, FILM COATED ORAL at 17:34

## 2019-04-14 RX ADMIN — Medication 5 MILLILITER(S): at 16:26

## 2019-04-14 RX ADMIN — Medication 100 MILLIGRAM(S): at 02:24

## 2019-04-14 RX ADMIN — Medication 5 MILLILITER(S): at 20:54

## 2019-04-14 RX ADMIN — URSODIOL 300 MILLIGRAM(S): 250 TABLET, FILM COATED ORAL at 17:34

## 2019-04-14 RX ADMIN — HYDROMORPHONE HYDROCHLORIDE 2 MILLIGRAM(S): 2 INJECTION INTRAMUSCULAR; INTRAVENOUS; SUBCUTANEOUS at 06:11

## 2019-04-14 RX ADMIN — HYDROMORPHONE HYDROCHLORIDE 2 MILLIGRAM(S): 2 INJECTION INTRAMUSCULAR; INTRAVENOUS; SUBCUTANEOUS at 06:42

## 2019-04-14 RX ADMIN — CHLORHEXIDINE GLUCONATE 1 APPLICATION(S): 213 SOLUTION TOPICAL at 06:10

## 2019-04-14 RX ADMIN — HYDROMORPHONE HYDROCHLORIDE 2 MILLIGRAM(S): 2 INJECTION INTRAMUSCULAR; INTRAVENOUS; SUBCUTANEOUS at 16:26

## 2019-04-14 RX ADMIN — Medication 10 MILLIGRAM(S): at 17:34

## 2019-04-14 RX ADMIN — Medication 5 MILLILITER(S): at 10:14

## 2019-04-14 RX ADMIN — Medication 10 MILLIGRAM(S): at 06:10

## 2019-04-14 RX ADMIN — TACROLIMUS 1 MILLIGRAM(S): 5 CAPSULE ORAL at 10:14

## 2019-04-14 RX ADMIN — Medication 1 GRAM(S): at 06:10

## 2019-04-14 RX ADMIN — Medication 125 MILLIGRAM(S): at 06:10

## 2019-04-14 RX ADMIN — HYDROMORPHONE HYDROCHLORIDE 2 MILLIGRAM(S): 2 INJECTION INTRAMUSCULAR; INTRAVENOUS; SUBCUTANEOUS at 11:12

## 2019-04-14 RX ADMIN — LEVETIRACETAM 500 MILLIGRAM(S): 250 TABLET, FILM COATED ORAL at 06:10

## 2019-04-14 RX ADMIN — HYDROMORPHONE HYDROCHLORIDE 2 MILLIGRAM(S): 2 INJECTION INTRAMUSCULAR; INTRAVENOUS; SUBCUTANEOUS at 11:27

## 2019-04-14 RX ADMIN — Medication 125 MILLIGRAM(S): at 12:14

## 2019-04-14 RX ADMIN — ATOVAQUONE 750 MILLIGRAM(S): 750 SUSPENSION ORAL at 17:34

## 2019-04-14 RX ADMIN — HYDROMORPHONE HYDROCHLORIDE 2 MILLIGRAM(S): 2 INJECTION INTRAMUSCULAR; INTRAVENOUS; SUBCUTANEOUS at 16:41

## 2019-04-14 RX ADMIN — TACROLIMUS 1 MILLIGRAM(S): 5 CAPSULE ORAL at 21:38

## 2019-04-14 NOTE — PROGRESS NOTE ADULT - SUBJECTIVE AND OBJECTIVE BOX
[facility] Transplant Team                                                      Critical / Counseling Time Provided: 30 minutes       Chief Complaint: hypotension, elevated WBC    S: Patient seen and examined with [facility] Transplant Team:     No complaint   Denies mouth / tongue / throat pain, dyspnea, cough, vomiting, diarrhea          O: Vitals:   Vital Signs Last 24 Hrs  T(C): 36.5 (2019 06:00), Max: 37.2 (2019 14:32)  T(F): 97.7 (2019 06:00), Max: 98.9 (2019 14:32)  HR: 67 (2019 06:00) (67 - 104)  BP: 137/79 (2019 06:00) (111/74 - 146/88)  BP(mean): --  RR: 16 (2019 06:00) (16 - 18)  SpO2: 100% (2019 06:00) (98% - 100%)    Admit weight: 108.5 kg   Daily     Daily Weight in k.5 (2019 10:52)    Intake / Output:    @ 07:01  -   @ 07:00  --------------------------------------------------------  IN: 2404 mL / OUT: 650 mL / NET: 1754 mL    PE:   Oropharynx: Clear  Oral Mucositis:   clear                                                     Grade:   CVS: RRR  Lungs: CTA  Abdomen: + BS, soft, mild  tenderness to palpation epigastric region  Gastric Mucositis:   -                                               Grade:   Intestinal Mucositis:   -                                           Grade:   Extremities: +1 bilateral LE edema  Skin: No rash  Line: PIV  Neuro: Awake alert and oriented  Pain: 0/10        Labs:         @ 09:24  Tacrolimus 5.0                Cyclosporine --        Cultures:         Radiology:     < from: CT Abdomen and Pelvis w/ Oral Cont (04.10.19 @ 17:54) >  IMPRESSION:   *   Trace bilateral pleural effusion, abdominal pelvic ascites,   subcutaneous edema, and edema of the root of the mesentery. Correlate   with the patient's fluid and oncotic status.  *  Wall thickening of the right is nonspecific, and may be related to the   patient's fluid status. Colitis is not excluded.          Meds:   Antimicrobials:   atovaquone Suspension 750 milliGRAM(s) Oral two times a day  valGANciclovir 450 milliGRAM(s) Oral two times a day  vancomycin    Solution 125 milliGRAM(s) Oral every 6 hours      Heme / Onc:       GI:  metoclopramide 10 milliGRAM(s) Oral every 6 hours  pantoprazole    Tablet 40 milliGRAM(s) Oral every 12 hours  simethicone 80 milliGRAM(s) Chew every 6 hours  sucralfate 1 Gram(s) Oral four times a day  sucralfate suspension 1 Gram(s) Oral every 6 hours  ursodiol Capsule 300 milliGRAM(s) Oral two times a day      Cardiovascular:       Immunologic:   tacrolimus 1 milliGRAM(s) Oral <User Schedule>      Other medications:   amitriptyline 25 milliGRAM(s) Oral at bedtime  Biotene Dry Mouth Oral Rinse 5 milliLiter(s) Swish and Spit five times a day  chlorhexidine 4% Liquid 1 Application(s) Topical <User Schedule>  folic acid 1 milliGRAM(s) Oral daily  levETIRAcetam 500 milliGRAM(s) Oral two times a day  methylPREDNISolone sodium succinate Injectable 100 milliGRAM(s) IV Push every 12 hours  multivitamin 1 Tablet(s) Oral daily      PRN:   HYDROmorphone  Injectable 2 milliGRAM(s) IV Push every 4 hours PRN  ondansetron    Tablet 8 milliGRAM(s) Oral three times a day PRN  ondansetron Injectable 8 milliGRAM(s) IV Push every 6 hours PRN  sodium chloride 0.9% lock flush 10 milliLiter(s) IV Push every 1 hour PRN         A/P:   59 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 Amylase and lipase WNL, continue clears   s/p Calorie count x 72 hours (4/3-), reglan IVP pre-meals  Discontinued Cefepime and MMF on 4/3, continue on Tacro.    Follow up repeat CMV PCR - pending   Tacro therapeutic, follow up level in am   Follow-up ferritin improving  FISH for Y pending from    Transaminitis grade1 most likely due to antifungals:  continue to monitor and hold azoles  Calorie count 40% of adequate intake.   Abdominal x-ray for worsening pain unremarkable.  GI consult today.  Repeat amylase/lipase WNL.  Start carafate/reglan/mylicon/IV Protonix for presumed gastritis.  repeat CT with oral and IV contrast c/w ascitics, likely  related to virus vs gvhd, started solumedrol 100 mg IV BID   PICC placed    1. Infectious Disease:   atovaquone Suspension 750 milliGRAM(s) Oral two times a day  valGANciclovir 450 milliGRAM(s) Oral two times a day  vancomycin    Solution 125 milliGRAM(s) Oral every 6 hours    2. VOD Prophylaxis: Actigall, Glutamine    3. GI Prophylaxis:  Protonix    4. Mouthcare - NS / NaHCO3 rinses, Mycelex, Biotene, skin care     5. GVHD prophylaxis   Continue Tacro 1mg bid  6. Transfuse & replete electrolytes prn   Will repeat uric acid in am    7. IV hydration, daily weights, strict I&O, prn diuresis     8. PO intake as tolerated, nutrition follow up as needed, MVI, folic acid   Continue Full Liquid; Ensure Enlive BID     9. Antiemetics, anti-diarrhea medications:   Reglan, Ativan    10. OOB as tolerated, physical therapy consult if needed     11. Monitor coags / fibrinogen 2x week, vitamin K as needed     12. Monitor closely for clinical changes, monitor for fevers     13. Emotional support provided, plan of care discussed and questions addressed     14. Patient education done regarding chemotherapy prep, plan of care, restrictions and discharge planning   15. Continue regular social work input     I have written the above note for Dr. Hernandes  who performed service with me in the room.   Yolis Dunaway NP-C ([phone])    I have seen and examined patient with NP, I agree with above note as scribed. [facility] Transplant Team                                                      Critical / Counseling Time Provided: 30 minutes       Chief Complaint: hypotension, elevated WBC    S: Patient seen and examined with [facility] Transplant Team:     +epigastric abd pain x few wks, little better;   BM soft;   appetite and energy better    Denies mouth / tongue / throat pain, dyspnea, cough, vomiting, diarrhea        O: Vitals:   Vital Signs Last 24 Hrs  T(C): 36.5 (2019 06:00), Max: 37.2 (2019 14:32)  T(F): 97.7 (2019 06:00), Max: 98.9 (2019 14:32)  HR: 67 (2019 06:00) (67 - 104)  BP: 137/79 (2019 06:00) (111/74 - 146/88)  BP(mean): --  RR: 16 (2019 06:00) (16 - 18)  SpO2: 100% (2019 06:00) (98% - 100%)      Admit weight: 108.5 kg   Daily     Daily Weight in k.5 (2019 10:52)    Intake / Output:    @ 07:01  -  -14 @ 07:00  --------------------------------------------------------  IN: 2404 mL / OUT: 650 mL / NET: 1754 mL    PE:   Oropharynx: Clear  Oral Mucositis:   clear                                                     Grade:   CVS: RRR  Lungs: CTA  Abdomen: + BS, soft, mild  tenderness to palpation epigastric region  Gastric Mucositis:   -                                               Grade:   Intestinal Mucositis:   -                                           Grade:   Extremities: +1 bilateral LE edema  Skin: No rash  Line: PIV  Neuro: Awake alert and oriented  Pain: 0/10        Labs:         @ 09:24  Tacrolimus 5.0                Cyclosporine --        Cultures:         Radiology:     < from: CT Abdomen and Pelvis w/ Oral Cont (04.10.19 @ 17:54) >  IMPRESSION:   *   Trace bilateral pleural effusion, abdominal pelvic ascites,   subcutaneous edema, and edema of the root of the mesentery. Correlate   with the patient's fluid and oncotic status.  *  Wall thickening of the right is nonspecific, and may be related to the   patient's fluid status. Colitis is not excluded.          Meds:   Antimicrobials:   atovaquone Suspension 750 milliGRAM(s) Oral two times a day  valGANciclovir 450 milliGRAM(s) Oral two times a day  vancomycin    Solution 125 milliGRAM(s) Oral every 6 hours      Heme / Onc:       GI:  metoclopramide 10 milliGRAM(s) Oral every 6 hours  pantoprazole    Tablet 40 milliGRAM(s) Oral every 12 hours  simethicone 80 milliGRAM(s) Chew every 6 hours  sucralfate 1 Gram(s) Oral four times a day  sucralfate suspension 1 Gram(s) Oral every 6 hours  ursodiol Capsule 300 milliGRAM(s) Oral two times a day      Cardiovascular:       Immunologic:   tacrolimus 1 milliGRAM(s) Oral <User Schedule>      Other medications:   amitriptyline 25 milliGRAM(s) Oral at bedtime  Biotene Dry Mouth Oral Rinse 5 milliLiter(s) Swish and Spit five times a day  chlorhexidine 4% Liquid 1 Application(s) Topical <User Schedule>  folic acid 1 milliGRAM(s) Oral daily  levETIRAcetam 500 milliGRAM(s) Oral two times a day  methylPREDNISolone sodium succinate Injectable 100 milliGRAM(s) IV Push every 12 hours  multivitamin 1 Tablet(s) Oral daily      PRN:   HYDROmorphone  Injectable 2 milliGRAM(s) IV Push every 4 hours PRN  ondansetron    Tablet 8 milliGRAM(s) Oral three times a day PRN  ondansetron Injectable 8 milliGRAM(s) IV Push every 6 hours PRN  sodium chloride 0.9% lock flush 10 milliLiter(s) IV Push every 1 hour PRN         A/P:   59 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 Amylase and lipase WNL, continue clears   s/p Calorie count x 72 hours (4/3-), reglan IVP pre-meals  Discontinued Cefepime and MMF on 4/3, continue on Tacro.    Follow up repeat CMV PCR - pending   Tacro therapeutic, follow up level in am   Follow-up ferritin improving  FISH for Y pending from    Transaminitis grade1 most likely due to antifungals:  continue to monitor and hold azoles  Calorie count 40% of adequate intake.   Abdominal x-ray for worsening pain unremarkable.  GI consult today.  Repeat amylase/lipase WNL.  Start carafate/reglan/mylicon/IV Protonix for presumed gastritis.  repeat CT with oral and IV contrast c/w ascitics, likely  related to virus vs gvhd, started solumedrol 100 mg IV BID   PICC placed    1. Infectious Disease:   atovaquone Suspension 750 milliGRAM(s) Oral two times a day  valGANciclovir 450 milliGRAM(s) Oral two times a day  vancomycin    Solution 125 milliGRAM(s) Oral every 6 hours    2. VOD Prophylaxis: Actigall, Glutamine    3. GI Prophylaxis:  Protonix    4. Mouthcare - NS / NaHCO3 rinses, Mycelex, Biotene, skin care     5. GVHD prophylaxis   Continue Tacro 1mg bid  6. Transfuse & replete electrolytes prn   Will repeat uric acid in am    7. IV hydration, daily weights, strict I&O, prn diuresis     8. PO intake as tolerated, nutrition follow up as needed, MVI, folic acid   Continue Full Liquid; Ensure Enlive BID     9. Antiemetics, anti-diarrhea medications:   Reglan, Ativan    10. OOB as tolerated, physical therapy consult if needed     11. Monitor coags / fibrinogen 2x week, vitamin K as needed     12. Monitor closely for clinical changes, monitor for fevers     13. Emotional support provided, plan of care discussed and questions addressed     14. Patient education done regarding chemotherapy prep, plan of care, restrictions and discharge planning   15. Continue regular social work input     I have written the above note for Dr. Hernandes  who performed service with me in the room.   Yolis Dunaway NP-C ([phone])    I have seen and examined patient with NP, I agree with above note as scribed. [facility] Transplant Team                                                      Critical / Counseling Time Provided: 30 minutes       Chief Complaint: hypotension, elevated WBC    S: Patient seen and examined with [facility] Transplant Team:     +epigastric abd pain x few wks, little better;   BM soft;   appetite and energy better    Denies mouth / tongue / throat pain, dyspnea, cough, vomiting, diarrhea        O: Vitals:   Vital Signs Last 24 Hrs  T(C): 36.5 (2019 06:00), Max: 37.2 (2019 14:32)  T(F): 97.7 (2019 06:00), Max: 98.9 (2019 14:32)  HR: 67 (2019 06:00) (67 - 104)  BP: 137/79 (2019 06:00) (111/74 - 146/88)  BP(mean): --  RR: 16 (2019 06:00) (16 - 18)  SpO2: 100% (2019 06:00) (98% - 100%)      Admit weight: 108.5 kg   Daily   108.8 kg today   Daily Weight in k.5 (2019 10:52)    Intake / Output:   -13 @ 07:  -  -14 @ 07:00  --------------------------------------------------------  IN: 2404 mL / OUT: 650 mL / NET: 1754 mL    PE:   Oropharynx: Clear  Oral Mucositis:   clear                                                     Grade:   CVS: RRR  Lungs: CTA  Abdomen: + BS, soft, non distended,  mild  tenderness to palpation epigastric region  Gastric Mucositis:   -                                               Grade:   Intestinal Mucositis:   -                                           Grade:   Extremities: trace left foot  edema  Skin: No rash  Line: R PICC C/D/I  Neuro: Awake alert and oriented  Pain: 6/10    LABS:                      8.7    4.9   )-----------( 62       ( 2019 07:05 )             28.1         Mean Cell Volume : 103.0 fl  Mean Cell Hemoglobin : 31.8 pg  Mean Cell Hemoglobin Concentration : 30.8 gm/dL  Auto Neutrophil # : 4.0 K/uL  Auto Lymphocyte # : 0.7 K/uL  Auto Monocyte # : 0.1 K/uL  Auto Eosinophil # : 0.0 K/uL  Auto Basophil # : 0.0 K/uL  Auto Neutrophil % : 82.8 %  Auto Lymphocyte % : 13.7 %  Auto Monocyte % : 2.6 %  Auto Eosinophil % : 0.5 %  Auto Basophil % : 0.4 %          143  |  109<H>  |  19  ----------------------------<  119<H>  4.7   |  24  |  1.02    Ca    9.7      2019 07:05  Phos  2.8       Mg     1.9         TPro  4.9<L>  /  Alb  2.9<L>  /  TBili  0.2  /  DBili  x   /  AST  49<H>  /  ALT  53<H>  /  AlkPhos  251<H>        Uric Acid 7.2      @ 09:24  Tacrolimus 5.0                Cyclosporine --      Cytomegalovirus By PCR (19 @ 13:18)    CMVPCR Lo.29: Assay lower limit of detection (LOD):  31.2 IU/mL (1.49 log10/mL)  Assay dynamic range:  50 to 156,000,000 (156M) CMV DNA IU/mL (1.70 log10/mL – 8.19 log10/mL)  Plasma CMV DNA Quantification by PCR using Abbott m2000:  The results of this test shouldbe interpreted with consideration of all  clinical and laboratory findings. In particular, caution should be used  when interpreting low level positive results when the test is used for  diagnostic purposes. Repeat testing on an additional sample is  recommended to confirm low level positive results. A result of "Not  Detected" does not preclude the possibility of infection with CMV.  CMV  DNA may be present below the detection limit of assay. LogIU/mL    Ferritin, Serum (19 @ 15:27)    Ferritin, Serum: 4057: Test Repeated ng/mL      Radiology:       < from: CT Abdomen and Pelvis w/ Oral Cont (04.10.19 @ 17:54) >  IMPRESSION:   *   Trace bilateral pleural effusion, abdominal pelvic ascites,   subcutaneous edema, and edema of the root of the mesentery. Correlate   with the patient's fluid and oncotic status.  *  Wall thickening of the right is nonspecific, and may be related to the   patient's fluid status. Colitis is not excluded.      Meds:   Antimicrobials:   atovaquone Suspension 750 milliGRAM(s) Oral two times a day  valGANciclovir 450 milliGRAM(s) Oral two times a day  vancomycin    Solution 125 milliGRAM(s) Oral every 6 hours      GI:  metoclopramide 10 milliGRAM(s) Oral every 6 hours  pantoprazole    Tablet 40 milliGRAM(s) Oral every 12 hours  simethicone 80 milliGRAM(s) Chew every 6 hours  sucralfate 1 Gram(s) Oral four times a day  sucralfate suspension 1 Gram(s) Oral every 6 hours  ursodiol Capsule 300 milliGRAM(s) Oral two times a day    Immunologic:   tacrolimus 1 milliGRAM(s) Oral <User Schedule>      Other medications:   amitriptyline 25 milliGRAM(s) Oral at bedtime  Biotene Dry Mouth Oral Rinse 5 milliLiter(s) Swish and Spit five times a day  chlorhexidine 4% Liquid 1 Application(s) Topical <User Schedule>  folic acid 1 milliGRAM(s) Oral daily  levETIRAcetam 500 milliGRAM(s) Oral two times a day  methylPREDNISolone sodium succinate Injectable 100 milliGRAM(s) IV Push every 12 hours  multivitamin 1 Tablet(s) Oral daily      PRN:   HYDROmorphone  Injectable 2 milliGRAM(s) IV Push every 4 hours PRN  ondansetron    Tablet 8 milliGRAM(s) Oral three times a day PRN  ondansetron Injectable 8 milliGRAM(s) IV Push every 6 hours PRN  sodium chloride 0.9% lock flush 10 milliLiter(s) IV Push every 1 hour PRN       A/P:   59 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 Amylase and lipase WNL, continue clears   s/p Calorie count x 72 hours (4/3-), reglan IVP pre-meals  Discontinued Cefepime and MMF on 4/3, continue on Tacro.    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   Abdominal x-ray for worsening pain unremarkable.  GI consulted.  Repeat amylase/lipase WNL.  Started  carafate/reglan/mylicon/IV Protonix for presumed gastritis.  repeat CT with oral and IV contrast c/w ascitics, likely  related to virus vs gvhd, started solumedrol 100 mg IV BID   PICC placed    1. Infectious Disease:   atovaquone Suspension 750 milliGRAM(s) Oral two times a day  valGANciclovir 450 milliGRAM(s) Oral two times a day  vancomycin    Solution 125 milliGRAM(s) Oral every 6 hours    2. VOD Prophylaxis: Actigall, Glutamine    3. GI Prophylaxis:   metoclopramide 10 milliGRAM(s) Oral every 6 hours  pantoprazole    Tablet 40 milliGRAM(s) Oral every 12 hours  simethicone 80 milliGRAM(s) Chew every 6 hours  sucralfate suspension 1 Gram(s) Oral every 6 hours  ursodiol Capsule 300 milliGRAM(s) Oral two times a day    4. Mouthcare - NS / NaHCO3 rinses, Mycelex, Biotene, skin care     5. GVHD prophylaxis   Continue Tacro 1mg bid    6. Transfuse & replete electrolytes prn    uric acid 7.2, monitor closely     7. IV hydration, daily weights, strict I&O, prn diuresis     8. PO intake as tolerated, nutrition follow up as needed, MVI, folic acid   Advanced to soft diet as tolerated + Ensure Enlive BID     9. Antiemetics, anti-diarrhea medications:   Reglan, Ativan    10. OOB as tolerated, physical therapy consult if needed     11. Monitor coags / fibrinogen 2x week, vitamin K as needed     12. Monitor closely for clinical changes, monitor for fevers     13. Emotional support provided, plan of care discussed and questions addressed     14. Patient education done regarding chemotherapy prep, plan of care, restrictions and discharge planning   15. Continue regular social work input     I have written the above note for Dr. Hernandes  who performed service with me in the room.   Yolis JOHNS ([phone])    I have seen and examined patient with NP, I agree with above note as scribed. [facility] Transplant Team                                                      Critical / Counseling Time Provided: 30 minutes       Chief Complaint: indigestion, pain, in epigastric area.   S: Patient seen and examined with [facility] Transplant Team:     appetite and energy better, walking around unit.     Denies mouth / tongue / throat pain, dyspnea, cough, vomiting, diarrhea        O: Vitals:   Vital Signs Last 24 Hrs  T(C): 36.5 (2019 06:00), Max: 37.2 (2019 14:32)  T(F): 97.7 (2019 06:00), Max: 98.9 (2019 14:32)  HR: 67 (2019 06:00) (67 - 104)  BP: 137/79 (2019 06:00) (111/74 - 146/88)  BP(mean): --  RR: 16 (2019 06:00) (16 - 18)  SpO2: 100% (2019 06:00) (98% - 100%)      Admit weight: 108.5 kg   Daily   108.8 kg today   Daily Weight in k.5 (2019 10:52)    Intake / Output:   04-13 @ 07:01  -  -14 @ 07:00  --------------------------------------------------------  IN: 2404 mL / OUT: 650 mL / NET: 1754 mL    PE: NAD omaya shunt  Oropharynx: Clear  Oral Mucositis:   clear                                                     Grade:   CVS: RRR  Lungs: CTA  Abdomen: + BS, soft, non distended,    Gastric Mucositis:   -                                               Grade:   Intestinal Mucositis:   -                                           Grade:   Extremities: +1 pitting edema  Skin: No rash  Line: R PICC C/D/I  Neuro: Awake alert and oriented    LABS:                      8.7    4.9   )-----------( 62       ( 2019 07:05 )             28.1         Mean Cell Volume : 103.0 fl  Mean Cell Hemoglobin : 31.8 pg  Mean Cell Hemoglobin Concentration : 30.8 gm/dL  Auto Neutrophil # : 4.0 K/uL  Auto Lymphocyte # : 0.7 K/uL  Auto Monocyte # : 0.1 K/uL  Auto Eosinophil # : 0.0 K/uL  Auto Basophil # : 0.0 K/uL  Auto Neutrophil % : 82.8 %  Auto Lymphocyte % : 13.7 %  Auto Monocyte % : 2.6 %  Auto Eosinophil % : 0.5 %  Auto Basophil % : 0.4 %          143  |  109<H>  |  19  ----------------------------<  119<H>  4.7   |  24  |  1.02    Ca    9.7      2019 07:05  Phos  2.8       Mg     1.9         TPro  4.9<L>  /  Alb  2.9<L>  /  TBili  0.2  /  DBili  x   /  AST  49<H>  /  ALT  53<H>  /  AlkPhos  251<H>        Uric Acid 7.2      @ 09:24  Tacrolimus 5.0                Cyclosporine --      Cytomegalovirus By PCR (19 @ 13:18)    CMVPCR Lo.29: Assay lower limit of detection (LOD):  31.2 IU/mL (1.49 log10/mL)  Assay dynamic range:  50 to 156,000,000 (156M) CMV DNA IU/mL (1.70 log10/mL – 8.19 log10/mL)  Plasma CMV DNA Quantification by PCR using Abbott m2000:  The results of this test shouldbe interpreted with consideration of all  clinical and laboratory findings. In particular, caution should be used  when interpreting low level positive results when the test is used for  diagnostic purposes. Repeat testing on an additional sample is  recommended to confirm low level positive results. A result of "Not  Detected" does not preclude the possibility of infection with CMV.  CMV  DNA may be present below the detection limit of assay. LogIU/mL    Ferritin, Serum (19 @ 15:27)    Ferritin, Serum: 4057: Test Repeated ng/mL      Radiology:       < from: CT Abdomen and Pelvis w/ Oral Cont (04.10.19 @ 17:54) >  IMPRESSION:   *   Trace bilateral pleural effusion, abdominal pelvic ascites,   subcutaneous edema, and edema of the root of the mesentery. Correlate   with the patient's fluid and oncotic status.  *  Wall thickening of the right is nonspecific, and may be related to the   patient's fluid status. Colitis is not excluded.      Meds:   Antimicrobials:   atovaquone Suspension 750 milliGRAM(s) Oral two times a day  valGANciclovir 450 milliGRAM(s) Oral two times a day  vancomycin    Solution 125 milliGRAM(s) Oral every 6 hours      GI:  metoclopramide 10 milliGRAM(s) Oral every 6 hours  pantoprazole    Tablet 40 milliGRAM(s) Oral every 12 hours  simethicone 80 milliGRAM(s) Chew every 6 hours  sucralfate 1 Gram(s) Oral four times a day  sucralfate suspension 1 Gram(s) Oral every 6 hours  ursodiol Capsule 300 milliGRAM(s) Oral two times a day    Immunologic:   tacrolimus 1 milliGRAM(s) Oral <User Schedule>      Other medications:   amitriptyline 25 milliGRAM(s) Oral at bedtime  Biotene Dry Mouth Oral Rinse 5 milliLiter(s) Swish and Spit five times a day  chlorhexidine 4% Liquid 1 Application(s) Topical <User Schedule>  folic acid 1 milliGRAM(s) Oral daily  levETIRAcetam 500 milliGRAM(s) Oral two times a day  methylPREDNISolone sodium succinate Injectable 100 milliGRAM(s) IV Push every 12 hours  multivitamin 1 Tablet(s) Oral daily      PRN:   HYDROmorphone  Injectable 2 milliGRAM(s) IV Push every 4 hours PRN  ondansetron    Tablet 8 milliGRAM(s) Oral three times a day PRN  ondansetron Injectable 8 milliGRAM(s) IV Push every 6 hours PRN  sodium chloride 0.9% lock flush 10 milliLiter(s) IV Push every 1 hour PRN       A/P:   59 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 Amylase and lipase WNL, continue clears   s/p Calorie count x 72 hours (4/3-), reglan IVP pre-meals  Discontinued Cefepime and MMF on 4/3, continue on Tacro.    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   Abdominal x-ray for worsening pain unremarkable.  GI consulted.  Repeat amylase/lipase WNL.  Started  carafate/reglan/mylicon/IV Protonix for presumed gastritis.  repeat CT with oral and IV contrast c/w ascitics, likely  related to virus vs gvhd, started solumedrol 100 mg IV BID   PICC placed    1. Infectious Disease:   atovaquone Suspension 750 milliGRAM(s) Oral two times a day  valGANciclovir 450 milliGRAM(s) Oral two times a day  vancomycin    Solution 125 milliGRAM(s) Oral every 6 hours    2. VOD Prophylaxis: Actigall, Glutamine    3. GI Prophylaxis:   metoclopramide 10 milliGRAM(s) Oral every 6 hours  pantoprazole    Tablet 40 milliGRAM(s) Oral every 12 hours  simethicone 80 milliGRAM(s) Chew every 6 hours  sucralfate suspension 1 Gram(s) Oral every 6 hours  ursodiol Capsule 300 milliGRAM(s) Oral two times a day    4. Mouthcare - NS / NaHCO3 rinses, Mycelex, Biotene, skin care     5. GVHD prophylaxis   Continue Tacro 1mg bid    6. Transfuse & replete electrolytes prn    uric acid 7.2, monitor closely     7. IV hydration, daily weights, strict I&O, prn diuresis     8. PO intake as tolerated, nutrition follow up as needed, MVI, folic acid   Advanced to soft diet as tolerated + Ensure Enlive BID     9. Antiemetics, anti-diarrhea medications:   Reglan, Ativan    10. OOB as tolerated, physical therapy consult if needed     11. Monitor coags / fibrinogen 2x week, vitamin K as needed     12. Monitor closely for clinical changes, monitor for fevers     13. Emotional support provided, plan of care discussed and questions addressed     14. Patient education done regarding chemotherapy prep, plan of care, restrictions and discharge planning   15. Continue regular social work input     I have written the above note for Dr. Hernandes  who performed service with me in the room.   Yolis Dunaway NP-C ([phone])    I have seen and examined patient with NP, I agree with above note as scribed. [facility] Team                                                      Critical / Counseling Time Provided: 30 minutes       Chief Complaint: indigestion, pain, in epigastric area.   S: Patient seen and examined with [facility] Transplant Team:     appetite and energy better, walking around unit.     Denies mouth / tongue / throat pain, dyspnea, cough, vomiting, diarrhea        O: Vitals:   Vital Signs Last 24 Hrs  T(C): 36.5 (2019 06:00), Max: 37.2 (2019 14:32)  T(F): 97.7 (2019 06:00), Max: 98.9 (2019 14:32)  HR: 67 (2019 06:00) (67 - 104)  BP: 137/79 (2019 06:00) (111/74 - 146/88)  BP(mean): --  RR: 16 (2019 06:00) (16 - 18)  SpO2: 100% (2019 06:00) (98% - 100%)      Admit weight: 108.5 kg   Daily   108.8 kg today   Daily Weight in k.5 (2019 10:52)    Intake / Output:   04-13 @ 07:01  -  -14 @ 07:00  --------------------------------------------------------  IN: 2404 mL / OUT: 650 mL / NET: 1754 mL    PE: NAD omaya shunt  Oropharynx: Clear  Oral Mucositis:   clear                                                     Grade:   CVS: RRR  Lungs: CTA  Abdomen: + BS, soft, non distended,    Gastric Mucositis:   -                                               Grade:   Intestinal Mucositis:   -                                           Grade:   Extremities: +1 pitting edema  Skin: No rash  Line: R PICC C/D/I  Neuro: Awake alert and oriented    LABS:                      8.7    4.9   )-----------( 62       ( 2019 07:05 )             28.1         Mean Cell Volume : 103.0 fl  Mean Cell Hemoglobin : 31.8 pg  Mean Cell Hemoglobin Concentration : 30.8 gm/dL  Auto Neutrophil # : 4.0 K/uL  Auto Lymphocyte # : 0.7 K/uL  Auto Monocyte # : 0.1 K/uL  Auto Eosinophil # : 0.0 K/uL  Auto Basophil # : 0.0 K/uL  Auto Neutrophil % : 82.8 %  Auto Lymphocyte % : 13.7 %  Auto Monocyte % : 2.6 %  Auto Eosinophil % : 0.5 %  Auto Basophil % : 0.4 %          143  |  109<H>  |  19  ----------------------------<  119<H>  4.7   |  24  |  1.02    Ca    9.7      2019 07:05  Phos  2.8       Mg     1.9         TPro  4.9<L>  /  Alb  2.9<L>  /  TBili  0.2  /  DBili  x   /  AST  49<H>  /  ALT  53<H>  /  AlkPhos  251<H>        Uric Acid 7.2      @ 09:24  Tacrolimus 5.0                Cyclosporine --      Cytomegalovirus By PCR (19 @ 13:18)    CMVPCR Lo.29: Assay lower limit of detection (LOD):  31.2 IU/mL (1.49 log10/mL)  Assay dynamic range:  50 to 156,000,000 (156M) CMV DNA IU/mL (1.70 log10/mL – 8.19 log10/mL)  Plasma CMV DNA Quantification by PCR using Abbott m2000:  The results of this test shouldbe interpreted with consideration of all  clinical and laboratory findings. In particular, caution should be used  when interpreting low level positive results when the test is used for  diagnostic purposes. Repeat testing on an additional sample is  recommended to confirm low level positive results. A result of "Not  Detected" does not preclude the possibility of infection with CMV.  CMV  DNA may be present below the detection limit of assay. LogIU/mL    Ferritin, Serum (19 @ 15:27)    Ferritin, Serum: 4057: Test Repeated ng/mL      Radiology:       < from: CT Abdomen and Pelvis w/ Oral Cont (04.10.19 @ 17:54) >  IMPRESSION:   *   Trace bilateral pleural effusion, abdominal pelvic ascites,   subcutaneous edema, and edema of the root of the mesentery. Correlate   with the patient's fluid and oncotic status.  *  Wall thickening of the right is nonspecific, and may be related to the   patient's fluid status. Colitis is not excluded.      Meds:   Antimicrobials:   atovaquone Suspension 750 milliGRAM(s) Oral two times a day  valGANciclovir 450 milliGRAM(s) Oral two times a day  vancomycin    Solution 125 milliGRAM(s) Oral every 6 hours      GI:  metoclopramide 10 milliGRAM(s) Oral every 6 hours  pantoprazole    Tablet 40 milliGRAM(s) Oral every 12 hours  simethicone 80 milliGRAM(s) Chew every 6 hours  sucralfate 1 Gram(s) Oral four times a day  sucralfate suspension 1 Gram(s) Oral every 6 hours  ursodiol Capsule 300 milliGRAM(s) Oral two times a day    Immunologic:   tacrolimus 1 milliGRAM(s) Oral <User Schedule>      Other medications:   amitriptyline 25 milliGRAM(s) Oral at bedtime  Biotene Dry Mouth Oral Rinse 5 milliLiter(s) Swish and Spit five times a day  chlorhexidine 4% Liquid 1 Application(s) Topical <User Schedule>  folic acid 1 milliGRAM(s) Oral daily  levETIRAcetam 500 milliGRAM(s) Oral two times a day  methylPREDNISolone sodium succinate Injectable 100 milliGRAM(s) IV Push every 12 hours  multivitamin 1 Tablet(s) Oral daily      PRN:   HYDROmorphone  Injectable 2 milliGRAM(s) IV Push every 4 hours PRN  ondansetron    Tablet 8 milliGRAM(s) Oral three times a day PRN  ondansetron Injectable 8 milliGRAM(s) IV Push every 6 hours PRN  sodium chloride 0.9% lock flush 10 milliLiter(s) IV Push every 1 hour PRN       A/P:   59 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 Amylase and lipase WNL, continue clears   s/p Calorie count x 72 hours (4/3-), reglan IVP pre-meals  Discontinued Cefepime and MMF on 4/3, continue on Tacro.    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   Abdominal x-ray for worsening pain unremarkable.  GI consulted.  Repeat amylase/lipase WNL.  Started  carafate/reglan/mylicon/IV Protonix for presumed gastritis.  repeat CT with oral and IV contrast c/w ascitics, likely  related to virus vs gvhd, started solumedrol 100 mg IV BID   PICC placed    1. Infectious Disease:   atovaquone Suspension 750 milliGRAM(s) Oral two times a day  valGANciclovir 450 milliGRAM(s) Oral two times a day  vancomycin    Solution 125 milliGRAM(s) Oral every 6 hours    2. VOD Prophylaxis: Actigall, Glutamine    3. GI Prophylaxis:   metoclopramide 10 milliGRAM(s) Oral every 6 hours  pantoprazole    Tablet 40 milliGRAM(s) Oral every 12 hours  simethicone 80 milliGRAM(s) Chew every 6 hours  sucralfate suspension 1 Gram(s) Oral every 6 hours  ursodiol Capsule 300 milliGRAM(s) Oral two times a day    4. Mouthcare - NS / NaHCO3 rinses, Mycelex, Biotene, skin care     5. GVHD prophylaxis   Continue Tacro 1mg bid    6. Transfuse & replete electrolytes prn    uric acid 7.2, monitor closely     7. IV hydration, daily weights, strict I&O, prn diuresis     8. PO intake as tolerated, nutrition follow up as needed, MVI, folic acid   Advanced to soft diet as tolerated + Ensure Enlive BID     9. Antiemetics, anti-diarrhea medications:   Reglan, Ativan    10. OOB as tolerated, physical therapy consult if needed     11. Monitor coags / fibrinogen 2x week, vitamin K as needed     12. Monitor closely for clinical changes, monitor for fevers     13. Emotional support provided, plan of care discussed and questions addressed     14. Patient education done regarding chemotherapy prep, plan of care, restrictions and discharge planning   15. Continue regular social work input     I have written the above note for Dr. Rene who performed service with me in the room.   Nataliya Martinez NP-C ([phone])    I have seen and examined patient with NP, I agree with above note as scribed. [facility] Transplant Team                                                      Critical / Counseling Time Provided: 30 minutes       Chief Complaint: indigestion, pain, in epigastric area.   S: Patient seen and examined with [facility] Transplant Team:     appetite and energy better, walking around unit.     Denies mouth / tongue / throat pain, dyspnea, cough, vomiting, diarrhea        O: Vitals:   Vital Signs Last 24 Hrs  T(C): 36.5 (2019 06:00), Max: 37.2 (2019 14:32)  T(F): 97.7 (2019 06:00), Max: 98.9 (2019 14:32)  HR: 67 (2019 06:00) (67 - 104)  BP: 137/79 (2019 06:00) (111/74 - 146/88)  BP(mean): --  RR: 16 (2019 06:00) (16 - 18)  SpO2: 100% (2019 06:00) (98% - 100%)      Admit weight: 108.5 kg   Daily   108.8 kg today   Daily Weight in k.5 (2019 10:52)    Intake / Output:   04-13 @ 07:01  -  -14 @ 07:00  --------------------------------------------------------  IN: 2404 mL / OUT: 650 mL / NET: 1754 mL    PE: NAD omaya shunt  Oropharynx: Clear  Oral Mucositis:   clear                                                     Grade:   CVS: RRR  Lungs: CTA  Abdomen: + BS, soft, non distended,    Gastric Mucositis:   -                                               Grade:   Intestinal Mucositis:   -                                           Grade:   Extremities: +1 pitting edema  Skin: No rash  Line: R PICC C/D/I  Neuro: Awake alert and oriented    LABS:                      8.7    4.9   )-----------( 62       ( 2019 07:05 )             28.1         Mean Cell Volume : 103.0 fl  Mean Cell Hemoglobin : 31.8 pg  Mean Cell Hemoglobin Concentration : 30.8 gm/dL  Auto Neutrophil # : 4.0 K/uL  Auto Lymphocyte # : 0.7 K/uL  Auto Monocyte # : 0.1 K/uL  Auto Eosinophil # : 0.0 K/uL  Auto Basophil # : 0.0 K/uL  Auto Neutrophil % : 82.8 %  Auto Lymphocyte % : 13.7 %  Auto Monocyte % : 2.6 %  Auto Eosinophil % : 0.5 %  Auto Basophil % : 0.4 %          143  |  109<H>  |  19  ----------------------------<  119<H>  4.7   |  24  |  1.02    Ca    9.7      2019 07:05  Phos  2.8       Mg     1.9         TPro  4.9<L>  /  Alb  2.9<L>  /  TBili  0.2  /  DBili  x   /  AST  49<H>  /  ALT  53<H>  /  AlkPhos  251<H>        Uric Acid 7.2      @ 09:24  Tacrolimus 5.0                Cyclosporine --      Cytomegalovirus By PCR (19 @ 13:18)    CMVPCR Lo.29: Assay lower limit of detection (LOD):  31.2 IU/mL (1.49 log10/mL)  Assay dynamic range:  50 to 156,000,000 (156M) CMV DNA IU/mL (1.70 log10/mL – 8.19 log10/mL)  Plasma CMV DNA Quantification by PCR using Abbott m2000:  The results of this test shouldbe interpreted with consideration of all  clinical and laboratory findings. In particular, caution should be used  when interpreting low level positive results when the test is used for  diagnostic purposes. Repeat testing on an additional sample is  recommended to confirm low level positive results. A result of "Not  Detected" does not preclude the possibility of infection with CMV.  CMV  DNA may be present below the detection limit of assay. LogIU/mL    Ferritin, Serum (19 @ 15:27)    Ferritin, Serum: 4057: Test Repeated ng/mL      Radiology:       < from: CT Abdomen and Pelvis w/ Oral Cont (04.10.19 @ 17:54) >  IMPRESSION:   *   Trace bilateral pleural effusion, abdominal pelvic ascites,   subcutaneous edema, and edema of the root of the mesentery. Correlate   with the patient's fluid and oncotic status.  *  Wall thickening of the right is nonspecific, and may be related to the   patient's fluid status. Colitis is not excluded.      Meds:   Antimicrobials:   atovaquone Suspension 750 milliGRAM(s) Oral two times a day  valGANciclovir 450 milliGRAM(s) Oral two times a day  vancomycin    Solution 125 milliGRAM(s) Oral every 6 hours      GI:  metoclopramide 10 milliGRAM(s) Oral every 6 hours  pantoprazole    Tablet 40 milliGRAM(s) Oral every 12 hours  simethicone 80 milliGRAM(s) Chew every 6 hours  sucralfate 1 Gram(s) Oral four times a day  sucralfate suspension 1 Gram(s) Oral every 6 hours  ursodiol Capsule 300 milliGRAM(s) Oral two times a day    Immunologic:   tacrolimus 1 milliGRAM(s) Oral <User Schedule>      Other medications:   amitriptyline 25 milliGRAM(s) Oral at bedtime  Biotene Dry Mouth Oral Rinse 5 milliLiter(s) Swish and Spit five times a day  chlorhexidine 4% Liquid 1 Application(s) Topical <User Schedule>  folic acid 1 milliGRAM(s) Oral daily  levETIRAcetam 500 milliGRAM(s) Oral two times a day  methylPREDNISolone sodium succinate Injectable 100 milliGRAM(s) IV Push every 12 hours  multivitamin 1 Tablet(s) Oral daily      PRN:   HYDROmorphone  Injectable 2 milliGRAM(s) IV Push every 4 hours PRN  ondansetron    Tablet 8 milliGRAM(s) Oral three times a day PRN  ondansetron Injectable 8 milliGRAM(s) IV Push every 6 hours PRN  sodium chloride 0.9% lock flush 10 milliLiter(s) IV Push every 1 hour PRN       A/P:   59 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 Amylase and lipase WNL, continue clears   s/p Calorie count x 72 hours (4/3-), reglan IVP pre-meals  Discontinued Cefepime and MMF on 4/3, continue on Tacro.    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   Abdominal x-ray for worsening pain unremarkable.  GI consulted.  Repeat amylase/lipase WNL.  Started  carafate/reglan/mylicon/IV Protonix for presumed gastritis.  repeat CT with oral and IV contrast c/w ascitics, likely  related to virus vs gvhd, started solumedrol 100 mg IV BID   PICC placed  Decrease solumedrol 90mg BID 4/15    1. Infectious Disease:   atovaquone Suspension 750 milliGRAM(s) Oral two times a day  valGANciclovir 450 milliGRAM(s) Oral two times a day  vancomycin    Solution 125 milliGRAM(s) Oral every 6 hours    2. VOD Prophylaxis: Actigall, Glutamine    3. GI Prophylaxis:   metoclopramide 10 milliGRAM(s) Oral every 6 hours  pantoprazole    Tablet 40 milliGRAM(s) Oral every 12 hours  simethicone 80 milliGRAM(s) Chew every 6 hours  sucralfate suspension 1 Gram(s) Oral every 6 hours  ursodiol Capsule 300 milliGRAM(s) Oral two times a day    4. Mouthcare - NS / NaHCO3 rinses, Mycelex, Biotene, skin care     5. GVHD prophylaxis   Continue Tacro 1mg bid    6. Transfuse & replete electrolytes prn    uric acid 7.2, monitor closely     7. IV hydration, daily weights, strict I&O, prn diuresis     8. PO intake as tolerated, nutrition follow up as needed, MVI, folic acid   Advanced to soft diet as tolerated + Ensure Enlive BID     9. Antiemetics, anti-diarrhea medications:   Reglan, Ativan    10. OOB as tolerated, physical therapy consult if needed     11. Monitor coags / fibrinogen 2x week, vitamin K as needed     12. Monitor closely for clinical changes, monitor for fevers     13. Emotional support provided, plan of care discussed and questions addressed     14. Patient education done regarding chemotherapy prep, plan of care, restrictions and discharge planning   15. Continue regular social work input     I have written the above note for Dr. Rene who performed service with me in the room.   Nataliya JOHNS ([phone])    I have seen and examined patient with NP, I agree with above note as scribed.

## 2019-04-14 NOTE — PROGRESS NOTE ADULT - ATTENDING COMMENTS
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  cmv pos..ct with vague findings in pancreas..amylase and lipase nl..unclear etiology of upper GI discomfort...was improving slowly..now appears worse....cellcept stopped b/c of falling counts....abd discomfort....ct noted....?? findings related to virus vs gvhd.....on valcyte 450 bid....cbc improved..cmv pcr down overall..stable since 4/5  non-neutropenic on px abx  on empiric po vanco for cdiff....being tapered slowly   Tacro level therapeutic, cont tacro 1 mg bid  was on MMF 1G PO BID....stopped on 4/4...?? gvhd...grade 1 upper..overall grade 2...started  solumedrol 100 mg bid..try to taper on monday to 90 mg...advance to full diet tomorrow.  Most recent FISH for Y on  100% donor.  repeat cmv pcr planned for twice weekly  cont valcyte 450 bid  Continue Actigall for VOD prevention  Continue Keppra for h/o seizures  Continue Mepron for PCP ppx  Continue supportive care.. possible d/c end of  week  On dilaudid 2 mg q4hr   Continue carafate, protonix. 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  cmv pos..ct with vague findings in pancreas..amylase and lipase nl..unclear etiology of upper GI discomfort...was improving slowly..now appears worse....cellcept stopped b/c of falling counts....abd discomfort....ct noted....?? findings related to virus vs gvhd.....on valcyte 450 bid....cbc improved..cmv pcr stable since 4/5  non-neutropenic on px abx  on empiric po vanco for cdiff....being tapered slowly   Tacro level therapeutic, cont tacro 1 mg bid  was on MMF 1G PO BID....stopped on 4/4...?? gvhd...grade 1 upper..overall grade 2...started  solumedrol 100 mg bid..try to taper on monday to 90 mg...advance to full diet today.  Most recent FISH for Y on  100% donor.  repeat cmv pcr planned for twice weekly  cont valcyte 450 bid  Continue Actigall for VOD prevention  Continue Keppra for h/o seizures  Continue Mepron for PCP ppx  Continue supportive care.. possible d/c end of  week  On dilaudid 2 mg q4hr   Continue carafate, protonix.  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  cmv positive ct with vague findings in pancreas amylase and lipase nl unclear etiology of upper GI discomfort was improving slowly. Patient complaints of abdominal pain localized to the epigastric area and left upper quadrant. 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     ID: valcyte 450 bid, cmv pcr stable since 4/5. Repeat CMV  pcr planned for twice weekly. PCR 4/8/19 33104. 4/12/19 pending.   cont valcyte 450 bid  non-neutropenic on px abx  on empiric po vanco  125 mg q 6hrs for c diff being tapered slowly   Tacro level therapeutic, cont tacro 1 mg bid  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 Continue  full diet, tolerated well.   Most recent FISH for Y on  100% donor.    Continue Actigall for VOD prevention  Continue Keppra for h/o seizures  Continue Mepron for PCP ppx  Continue supportive care possible d/c end of  week  On dilaudid 2 mg q4hr   Continue carafate, protonix.  Ambulation as tolerated.

## 2019-04-15 LAB
ALBUMIN SERPL ELPH-MCNC: 3 G/DL — LOW (ref 3.3–5)
ALP SERPL-CCNC: 236 U/L — HIGH (ref 40–120)
ALT FLD-CCNC: 65 U/L — HIGH (ref 10–45)
ANION GAP SERPL CALC-SCNC: 8 MMOL/L — SIGNIFICANT CHANGE UP (ref 5–17)
APTT BLD: 27.6 SEC — SIGNIFICANT CHANGE UP (ref 27.5–36.3)
AST SERPL-CCNC: 63 U/L — HIGH (ref 10–40)
BASOPHILS # BLD AUTO: 0 K/UL — SIGNIFICANT CHANGE UP (ref 0–0.2)
BASOPHILS NFR BLD AUTO: 0.2 % — SIGNIFICANT CHANGE UP (ref 0–2)
BILIRUB SERPL-MCNC: 0.3 MG/DL — SIGNIFICANT CHANGE UP (ref 0.2–1.2)
BLD GP AB SCN SERPL QL: NEGATIVE — SIGNIFICANT CHANGE UP
BUN SERPL-MCNC: 20 MG/DL — SIGNIFICANT CHANGE UP (ref 7–23)
CALCIUM SERPL-MCNC: 9.6 MG/DL — SIGNIFICANT CHANGE UP (ref 8.4–10.5)
CHLORIDE SERPL-SCNC: 109 MMOL/L — HIGH (ref 96–108)
CMV DNA CSF QL NAA+PROBE: SIGNIFICANT CHANGE UP
CMV DNA SPEC NAA+PROBE-LOG#: SIGNIFICANT CHANGE UP LOGIU/ML
CO2 SERPL-SCNC: 24 MMOL/L — SIGNIFICANT CHANGE UP (ref 22–31)
CREAT SERPL-MCNC: 0.95 MG/DL — SIGNIFICANT CHANGE UP (ref 0.5–1.3)
EOSINOPHIL # BLD AUTO: 0 K/UL — SIGNIFICANT CHANGE UP (ref 0–0.5)
EOSINOPHIL NFR BLD AUTO: 0.3 % — SIGNIFICANT CHANGE UP (ref 0–6)
GLUCOSE SERPL-MCNC: 133 MG/DL — HIGH (ref 70–99)
HCT VFR BLD CALC: 27.8 % — LOW (ref 34.5–45)
HGB BLD-MCNC: 9.2 G/DL — LOW (ref 11.5–15.5)
INR BLD: 0.89 RATIO — SIGNIFICANT CHANGE UP (ref 0.88–1.16)
LDH SERPL L TO P-CCNC: 301 U/L — HIGH (ref 50–242)
LYMPHOCYTES # BLD AUTO: 0.7 K/UL — LOW (ref 1–3.3)
LYMPHOCYTES # BLD AUTO: 11.3 % — LOW (ref 13–44)
MAGNESIUM SERPL-MCNC: 1.9 MG/DL — SIGNIFICANT CHANGE UP (ref 1.6–2.6)
MCHC RBC-ENTMCNC: 33.2 GM/DL — SIGNIFICANT CHANGE UP (ref 32–36)
MCHC RBC-ENTMCNC: 34.2 PG — HIGH (ref 27–34)
MCV RBC AUTO: 103 FL — HIGH (ref 80–100)
MONOCYTES # BLD AUTO: 0.1 K/UL — SIGNIFICANT CHANGE UP (ref 0–0.9)
MONOCYTES NFR BLD AUTO: 2.2 % — SIGNIFICANT CHANGE UP (ref 2–14)
NEUTROPHILS # BLD AUTO: 5.4 K/UL — SIGNIFICANT CHANGE UP (ref 1.8–7.4)
NEUTROPHILS NFR BLD AUTO: 86 % — HIGH (ref 43–77)
PHOSPHATE SERPL-MCNC: 2 MG/DL — LOW (ref 2.5–4.5)
PLATELET # BLD AUTO: 69 K/UL — LOW (ref 150–400)
POTASSIUM SERPL-MCNC: 4.9 MMOL/L — SIGNIFICANT CHANGE UP (ref 3.5–5.3)
POTASSIUM SERPL-SCNC: 4.9 MMOL/L — SIGNIFICANT CHANGE UP (ref 3.5–5.3)
PROT SERPL-MCNC: 4.8 G/DL — LOW (ref 6–8.3)
PROTHROM AB SERPL-ACNC: 10.2 SEC — SIGNIFICANT CHANGE UP (ref 10–12.9)
RBC # BLD: 2.7 M/UL — LOW (ref 3.8–5.2)
RBC # FLD: 17.4 % — HIGH (ref 10.3–14.5)
RH IG SCN BLD-IMP: POSITIVE — SIGNIFICANT CHANGE UP
SODIUM SERPL-SCNC: 141 MMOL/L — SIGNIFICANT CHANGE UP (ref 135–145)
TACROLIMUS SERPL-MCNC: 4.8 NG/ML — SIGNIFICANT CHANGE UP
URATE SERPL-MCNC: 6.6 MG/DL — SIGNIFICANT CHANGE UP (ref 2.5–7)
WBC # BLD: 6.3 K/UL — SIGNIFICANT CHANGE UP (ref 3.8–10.5)
WBC # FLD AUTO: 6.3 K/UL — SIGNIFICANT CHANGE UP (ref 3.8–10.5)

## 2019-04-15 PROCEDURE — 99291 CRITICAL CARE FIRST HOUR: CPT

## 2019-04-15 RX ORDER — POTASSIUM PHOSPHATE, MONOBASIC POTASSIUM PHOSPHATE, DIBASIC 236; 224 MG/ML; MG/ML
15 INJECTION, SOLUTION INTRAVENOUS ONCE
Qty: 0 | Refills: 0 | Status: COMPLETED | OUTPATIENT
Start: 2019-04-15 | End: 2019-04-15

## 2019-04-15 RX ADMIN — HYDROMORPHONE HYDROCHLORIDE 2 MILLIGRAM(S): 2 INJECTION INTRAMUSCULAR; INTRAVENOUS; SUBCUTANEOUS at 06:56

## 2019-04-15 RX ADMIN — Medication 5 MILLILITER(S): at 00:27

## 2019-04-15 RX ADMIN — HYDROMORPHONE HYDROCHLORIDE 2 MILLIGRAM(S): 2 INJECTION INTRAMUSCULAR; INTRAVENOUS; SUBCUTANEOUS at 19:31

## 2019-04-15 RX ADMIN — SIMETHICONE 80 MILLIGRAM(S): 80 TABLET, CHEWABLE ORAL at 17:11

## 2019-04-15 RX ADMIN — Medication 5 MILLILITER(S): at 23:07

## 2019-04-15 RX ADMIN — URSODIOL 300 MILLIGRAM(S): 250 TABLET, FILM COATED ORAL at 06:29

## 2019-04-15 RX ADMIN — Medication 90 MILLIGRAM(S): at 17:10

## 2019-04-15 RX ADMIN — HYDROMORPHONE HYDROCHLORIDE 2 MILLIGRAM(S): 2 INJECTION INTRAMUSCULAR; INTRAVENOUS; SUBCUTANEOUS at 02:50

## 2019-04-15 RX ADMIN — Medication 125 MILLIGRAM(S): at 23:07

## 2019-04-15 RX ADMIN — TACROLIMUS 1 MILLIGRAM(S): 5 CAPSULE ORAL at 21:18

## 2019-04-15 RX ADMIN — Medication 1 GRAM(S): at 17:11

## 2019-04-15 RX ADMIN — HYDROMORPHONE HYDROCHLORIDE 2 MILLIGRAM(S): 2 INJECTION INTRAMUSCULAR; INTRAVENOUS; SUBCUTANEOUS at 19:01

## 2019-04-15 RX ADMIN — PANTOPRAZOLE SODIUM 40 MILLIGRAM(S): 20 TABLET, DELAYED RELEASE ORAL at 06:29

## 2019-04-15 RX ADMIN — Medication 5 MILLILITER(S): at 12:41

## 2019-04-15 RX ADMIN — ATOVAQUONE 750 MILLIGRAM(S): 750 SUSPENSION ORAL at 06:29

## 2019-04-15 RX ADMIN — HYDROMORPHONE HYDROCHLORIDE 2 MILLIGRAM(S): 2 INJECTION INTRAMUSCULAR; INTRAVENOUS; SUBCUTANEOUS at 10:31

## 2019-04-15 RX ADMIN — Medication 10 MILLIGRAM(S): at 23:07

## 2019-04-15 RX ADMIN — HYDROMORPHONE HYDROCHLORIDE 2 MILLIGRAM(S): 2 INJECTION INTRAMUSCULAR; INTRAVENOUS; SUBCUTANEOUS at 03:10

## 2019-04-15 RX ADMIN — HYDROMORPHONE HYDROCHLORIDE 2 MILLIGRAM(S): 2 INJECTION INTRAMUSCULAR; INTRAVENOUS; SUBCUTANEOUS at 15:03

## 2019-04-15 RX ADMIN — Medication 1 GRAM(S): at 00:27

## 2019-04-15 RX ADMIN — Medication 25 MILLIGRAM(S): at 21:18

## 2019-04-15 RX ADMIN — Medication 1 TABLET(S): at 12:38

## 2019-04-15 RX ADMIN — Medication 125 MILLIGRAM(S): at 12:38

## 2019-04-15 RX ADMIN — HYDROMORPHONE HYDROCHLORIDE 2 MILLIGRAM(S): 2 INJECTION INTRAMUSCULAR; INTRAVENOUS; SUBCUTANEOUS at 06:30

## 2019-04-15 RX ADMIN — HYDROMORPHONE HYDROCHLORIDE 2 MILLIGRAM(S): 2 INJECTION INTRAMUSCULAR; INTRAVENOUS; SUBCUTANEOUS at 15:18

## 2019-04-15 RX ADMIN — SIMETHICONE 80 MILLIGRAM(S): 80 TABLET, CHEWABLE ORAL at 00:27

## 2019-04-15 RX ADMIN — Medication 10 MILLIGRAM(S): at 12:38

## 2019-04-15 RX ADMIN — Medication 125 MILLIGRAM(S): at 06:29

## 2019-04-15 RX ADMIN — URSODIOL 300 MILLIGRAM(S): 250 TABLET, FILM COATED ORAL at 17:11

## 2019-04-15 RX ADMIN — Medication 5 MILLILITER(S): at 15:03

## 2019-04-15 RX ADMIN — Medication 10 MILLIGRAM(S): at 06:29

## 2019-04-15 RX ADMIN — SIMETHICONE 80 MILLIGRAM(S): 80 TABLET, CHEWABLE ORAL at 12:38

## 2019-04-15 RX ADMIN — HYDROMORPHONE HYDROCHLORIDE 2 MILLIGRAM(S): 2 INJECTION INTRAMUSCULAR; INTRAVENOUS; SUBCUTANEOUS at 23:37

## 2019-04-15 RX ADMIN — CHLORHEXIDINE GLUCONATE 1 APPLICATION(S): 213 SOLUTION TOPICAL at 06:30

## 2019-04-15 RX ADMIN — Medication 125 MILLIGRAM(S): at 00:27

## 2019-04-15 RX ADMIN — SIMETHICONE 80 MILLIGRAM(S): 80 TABLET, CHEWABLE ORAL at 23:07

## 2019-04-15 RX ADMIN — Medication 1 GRAM(S): at 23:07

## 2019-04-15 RX ADMIN — Medication 1 MILLIGRAM(S): at 12:38

## 2019-04-15 RX ADMIN — LEVETIRACETAM 500 MILLIGRAM(S): 250 TABLET, FILM COATED ORAL at 17:10

## 2019-04-15 RX ADMIN — Medication 5 MILLILITER(S): at 09:14

## 2019-04-15 RX ADMIN — VALGANCICLOVIR 450 MILLIGRAM(S): 450 TABLET, FILM COATED ORAL at 21:19

## 2019-04-15 RX ADMIN — PANTOPRAZOLE SODIUM 40 MILLIGRAM(S): 20 TABLET, DELAYED RELEASE ORAL at 17:10

## 2019-04-15 RX ADMIN — Medication 5 MILLILITER(S): at 19:01

## 2019-04-15 RX ADMIN — HYDROMORPHONE HYDROCHLORIDE 2 MILLIGRAM(S): 2 INJECTION INTRAMUSCULAR; INTRAVENOUS; SUBCUTANEOUS at 10:46

## 2019-04-15 RX ADMIN — Medication 1 GRAM(S): at 06:29

## 2019-04-15 RX ADMIN — SIMETHICONE 80 MILLIGRAM(S): 80 TABLET, CHEWABLE ORAL at 06:29

## 2019-04-15 RX ADMIN — TACROLIMUS 1 MILLIGRAM(S): 5 CAPSULE ORAL at 09:14

## 2019-04-15 RX ADMIN — Medication 10 MILLIGRAM(S): at 00:27

## 2019-04-15 RX ADMIN — Medication 100 MILLIGRAM(S): at 02:30

## 2019-04-15 RX ADMIN — Medication 10 MILLIGRAM(S): at 17:10

## 2019-04-15 RX ADMIN — ATOVAQUONE 750 MILLIGRAM(S): 750 SUSPENSION ORAL at 17:10

## 2019-04-15 RX ADMIN — Medication 125 MILLIGRAM(S): at 17:11

## 2019-04-15 RX ADMIN — LEVETIRACETAM 500 MILLIGRAM(S): 250 TABLET, FILM COATED ORAL at 06:29

## 2019-04-15 RX ADMIN — POTASSIUM PHOSPHATE, MONOBASIC POTASSIUM PHOSPHATE, DIBASIC 62.5 MILLIMOLE(S): 236; 224 INJECTION, SOLUTION INTRAVENOUS at 10:30

## 2019-04-15 RX ADMIN — HYDROMORPHONE HYDROCHLORIDE 2 MILLIGRAM(S): 2 INJECTION INTRAMUSCULAR; INTRAVENOUS; SUBCUTANEOUS at 23:07

## 2019-04-15 RX ADMIN — VALGANCICLOVIR 450 MILLIGRAM(S): 450 TABLET, FILM COATED ORAL at 09:14

## 2019-04-15 RX ADMIN — Medication 1 GRAM(S): at 12:38

## 2019-04-15 NOTE — PROGRESS NOTE ADULT - SUBJECTIVE AND OBJECTIVE BOX
HPC Transplant Team                                                      Critical / Counseling Time Provided: 30 minutes                                                                                                                                                        Chief Complaint: indigestion, pain, in epigastric area.   S: Patient seen and examined with [facility] Transplant Team:     appetite and energy better, walking around unit.     Denies mouth / tongue / throat pain, dyspnea, cough, vomiting, diarrhea      O: Vitals:   Vital Signs Last 24 Hrs  T(C): 36 (15 Apr 2019 13:24), Max: 37.1 (2019 17:25)  T(F): 96.8 (15 Apr 2019 13:24), Max: 98.7 (2019 17:25)  HR: 84 (15 Apr 2019 13:) (71 - 98)  BP: 129/74 (15 Apr 2019 13:) (125/69 - 156/99)  BP(mean): --  RR: 18 (15 Apr 2019 13:24) (18 - 18)  SpO2: 98% (15 Apr 2019 13:) (95% - 100%)    Admit weight: 108.5kg   Daily     Daily Weight in k (15 Apr 2019 10:00)    Intake / Output:    @ 07:  -  15 @ 07:00  --------------------------------------------------------  IN: 492 mL / OUT: 650 mL / NET: -158 mL    04-15 @ 07:01  15 @ 15:57  --------------------------------------------------------  IN: 720 mL / OUT: 300 mL / NET: 420 mL    PE: NAD omaya shunt  Oropharynx: Clear  Oral Mucositis:   clear                                                     Grade:   CVS: RRR  Lungs: CTA  Abdomen: + BS, soft, non distended,    Gastric Mucositis:   -                                               Grade:   Intestinal Mucositis:   -                                           Grade:   Extremities: +1 pitting edema  Skin: No rash  Line: R PICC C/D/I  Neuro: Awake alert and oriented    Labs:   CBC Full  -  ( 15 Apr 2019 07:35 )  WBC Count : 6.3 K/uL  Hemoglobin : 9.2 g/dL  Hematocrit : 27.8 %  Platelet Count - Automated : 69 K/uL  Mean Cell Volume : 103.0 fl  Mean Cell Hemoglobin : 34.2 pg  Mean Cell Hemoglobin Concentration : 33.2 gm/dL  Auto Neutrophil # : 5.4 K/uL  Auto Lymphocyte # : 0.7 K/uL  Auto Monocyte # : 0.1 K/uL  Auto Eosinophil # : 0.0 K/uL  Auto Basophil # : 0.0 K/uL  Auto Neutrophil % : 86.0 %  Auto Lymphocyte % : 11.3 %  Auto Monocyte % : 2.2 %  Auto Eosinophil % : 0.3 %  Auto Basophil % : 0.2 %                          9.2    6.3   )-----------( 69       ( 15 Apr 2019 07:35 )             27.8     04-15    141  |  109<H>  |  20  ----------------------------<  133<H>  4.9   |  24  |  0.95    Ca    9.6      15 Apr 2019 07:35  Phos  2.0     04-15  Mg     1.9     04-15    TPro  4.8<L>  /  Alb  3.0<L>  /  TBili  0.3  /  DBili  x   /  AST  63<H>  /  ALT  65<H>  /  AlkPhos  236<H>  04-15    PT/INR - ( 15 Apr 2019 07:35 )   PT: 10.2 sec;   INR: 0.89 ratio         PTT - ( 15 Apr 2019 07:35 )  PTT:27.6 sec  LIVER FUNCTIONS - ( 15 Apr 2019 07:35 )  Alb: 3.0 g/dL / Pro: 4.8 g/dL / ALK PHOS: 236 U/L / ALT: 65 U/L / AST: 63 U/L / GGT: x           Lactate Dehydrogenase, Serum: 301 U/L (04-15 @ 07:35)       Meds:   Antimicrobials:   atovaquone Suspension 750 milliGRAM(s) Oral two times a day  valGANciclovir 450 milliGRAM(s) Oral two times a day  vancomycin    Solution 125 milliGRAM(s) Oral every 6 hours      GI:  metoclopramide 10 milliGRAM(s) Oral every 6 hours  pantoprazole    Tablet 40 milliGRAM(s) Oral every 12 hours  simethicone 80 milliGRAM(s) Chew every 6 hours  sucralfate suspension 1 Gram(s) Oral every 6 hours  ursodiol Capsule 300 milliGRAM(s) Oral two times a day    Immunologic:   tacrolimus 1 milliGRAM(s) Oral <User Schedule>      Other medications:   amitriptyline 25 milliGRAM(s) Oral at bedtime  Biotene Dry Mouth Oral Rinse 5 milliLiter(s) Swish and Spit five times a day  chlorhexidine 4% Liquid 1 Application(s) Topical <User Schedule>  folic acid 1 milliGRAM(s) Oral daily  levETIRAcetam 500 milliGRAM(s) Oral two times a day  methylPREDNISolone sodium succinate Injectable 100 milliGRAM(s) IV Push every 12 hours  multivitamin 1 Tablet(s) Oral daily      A/P:   59 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 Amylase and lipase WNL, continue clears   s/p Calorie count x 72 hours (4/3-), reglan IVP pre-meals  Discontinued Cefepime and MMF on 4/3, continue on Tacro.    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   Abdominal x-ray for worsening pain unremarkable.  GI consulted.  Repeat amylase/lipase WNL.  Started  carafate/reglan/mylicon/IV Protonix for presumed gastritis.  repeat CT with oral and IV contrast c/w ascitics, likely  related to virus vs gvhd, started solumedrol 100 mg IV BID   PICC placed  Decrease solumedrol 90mg BID 4/15    1. Infectious Disease:   atovaquone Suspension 750 milliGRAM(s) Oral two times a day  valGANciclovir 450 milliGRAM(s) Oral two times a day  vancomycin    Solution 125 milliGRAM(s) Oral every 6 hours    2. VOD Prophylaxis: Actigall, Glutamine    3. GI Prophylaxis:   metoclopramide 10 milliGRAM(s) Oral every 6 hours  pantoprazole    Tablet 40 milliGRAM(s) Oral every 12 hours  simethicone 80 milliGRAM(s) Chew every 6 hours  sucralfate suspension 1 Gram(s) Oral every 6 hours  ursodiol Capsule 300 milliGRAM(s) Oral two times a day    4. Mouthcare - NS / NaHCO3 rinses, Mycelex, Biotene, skin care     5. GVHD prophylaxis   Continue Tacro 1mg bid    6. Transfuse & replete electrolytes prn    uric acid 7.2, monitor closely     7. IV hydration, daily weights, strict I&O, prn diuresis     8. PO intake as tolerated, nutrition follow up as needed, MVI, folic acid   Advanced to soft diet as tolerated + Ensure Enlive BID     9. Antiemetics, anti-diarrhea medications:   Reglan, Ativan    10. OOB as tolerated, physical therapy consult if needed     11. Monitor coags / fibrinogen 2x week, vitamin K as needed     12. Monitor closely for clinical changes, monitor for fevers     13. Emotional support provided, plan of care discussed and questions addressed     14. Patient education done regarding chemotherapy prep, plan of care, restrictions and discharge planning   15. Continue regular social work input     I have written the above note for Dr. Rene who performed service with me in the room.   Nataliya Martinez NP-C ([phone])    I have seen and examined patient with NP, I agree with above note as scribed.

## 2019-04-16 LAB
ALBUMIN SERPL ELPH-MCNC: 2.8 G/DL — LOW (ref 3.3–5)
ALP SERPL-CCNC: 219 U/L — HIGH (ref 40–120)
ALT FLD-CCNC: 76 U/L — HIGH (ref 10–45)
ANION GAP SERPL CALC-SCNC: 9 MMOL/L — SIGNIFICANT CHANGE UP (ref 5–17)
AST SERPL-CCNC: 64 U/L — HIGH (ref 10–40)
BASOPHILS # BLD AUTO: 0 K/UL — SIGNIFICANT CHANGE UP (ref 0–0.2)
BILIRUB SERPL-MCNC: 0.3 MG/DL — SIGNIFICANT CHANGE UP (ref 0.2–1.2)
BUN SERPL-MCNC: 22 MG/DL — SIGNIFICANT CHANGE UP (ref 7–23)
CALCIUM SERPL-MCNC: 9.4 MG/DL — SIGNIFICANT CHANGE UP (ref 8.4–10.5)
CHLORIDE SERPL-SCNC: 106 MMOL/L — SIGNIFICANT CHANGE UP (ref 96–108)
CMV DNA CSF QL NAA+PROBE: 1211 — SIGNIFICANT CHANGE UP
CMV DNA SPEC NAA+PROBE-LOG#: 3.08 LOGIU/ML — HIGH
CO2 SERPL-SCNC: 26 MMOL/L — SIGNIFICANT CHANGE UP (ref 22–31)
CREAT SERPL-MCNC: 0.88 MG/DL — SIGNIFICANT CHANGE UP (ref 0.5–1.3)
EOSINOPHIL # BLD AUTO: 0 K/UL — SIGNIFICANT CHANGE UP (ref 0–0.5)
GLUCOSE SERPL-MCNC: 100 MG/DL — HIGH (ref 70–99)
HCT VFR BLD CALC: 29.8 % — LOW (ref 34.5–45)
HGB BLD-MCNC: 9.2 G/DL — LOW (ref 11.5–15.5)
LDH SERPL L TO P-CCNC: 288 U/L — HIGH (ref 50–242)
LYMPHOCYTES # BLD AUTO: 1.1 K/UL — SIGNIFICANT CHANGE UP (ref 1–3.3)
LYMPHOCYTES # BLD AUTO: 11 % — LOW (ref 13–44)
MAGNESIUM SERPL-MCNC: 1.8 MG/DL — SIGNIFICANT CHANGE UP (ref 1.6–2.6)
MCHC RBC-ENTMCNC: 30.9 GM/DL — LOW (ref 32–36)
MCHC RBC-ENTMCNC: 32.2 PG — SIGNIFICANT CHANGE UP (ref 27–34)
MCV RBC AUTO: 104 FL — HIGH (ref 80–100)
MONOCYTES # BLD AUTO: 0.4 K/UL — SIGNIFICANT CHANGE UP (ref 0–0.9)
MONOCYTES NFR BLD AUTO: 8 % — SIGNIFICANT CHANGE UP (ref 2–14)
NEUTROPHILS # BLD AUTO: 6.3 K/UL — SIGNIFICANT CHANGE UP (ref 1.8–7.4)
NEUTROPHILS NFR BLD AUTO: 79 % — HIGH (ref 43–77)
PHOSPHATE SERPL-MCNC: 2 MG/DL — LOW (ref 2.5–4.5)
PLATELET # BLD AUTO: 88 K/UL — LOW (ref 150–400)
POTASSIUM SERPL-MCNC: 4.3 MMOL/L — SIGNIFICANT CHANGE UP (ref 3.5–5.3)
POTASSIUM SERPL-SCNC: 4.3 MMOL/L — SIGNIFICANT CHANGE UP (ref 3.5–5.3)
PROT SERPL-MCNC: 4.7 G/DL — LOW (ref 6–8.3)
RBC # BLD: 2.86 M/UL — LOW (ref 3.8–5.2)
RBC # FLD: 18.1 % — HIGH (ref 10.3–14.5)
SODIUM SERPL-SCNC: 141 MMOL/L — SIGNIFICANT CHANGE UP (ref 135–145)
URATE SERPL-MCNC: 5.9 MG/DL — SIGNIFICANT CHANGE UP (ref 2.5–7)
WBC # BLD: 7.8 K/UL — SIGNIFICANT CHANGE UP (ref 3.8–10.5)
WBC # FLD AUTO: 7.8 K/UL — SIGNIFICANT CHANGE UP (ref 3.8–10.5)

## 2019-04-16 PROCEDURE — 99291 CRITICAL CARE FIRST HOUR: CPT

## 2019-04-16 RX ORDER — POTASSIUM PHOSPHATE, MONOBASIC POTASSIUM PHOSPHATE, DIBASIC 236; 224 MG/ML; MG/ML
15 INJECTION, SOLUTION INTRAVENOUS ONCE
Qty: 0 | Refills: 0 | Status: COMPLETED | OUTPATIENT
Start: 2019-04-16 | End: 2019-04-16

## 2019-04-16 RX ADMIN — VALGANCICLOVIR 450 MILLIGRAM(S): 450 TABLET, FILM COATED ORAL at 10:01

## 2019-04-16 RX ADMIN — Medication 1 GRAM(S): at 05:52

## 2019-04-16 RX ADMIN — Medication 90 MILLIGRAM(S): at 17:30

## 2019-04-16 RX ADMIN — Medication 5 MILLILITER(S): at 21:39

## 2019-04-16 RX ADMIN — Medication 10 MILLIGRAM(S): at 17:31

## 2019-04-16 RX ADMIN — HYDROMORPHONE HYDROCHLORIDE 2 MILLIGRAM(S): 2 INJECTION INTRAMUSCULAR; INTRAVENOUS; SUBCUTANEOUS at 13:58

## 2019-04-16 RX ADMIN — Medication 10 MILLIGRAM(S): at 23:01

## 2019-04-16 RX ADMIN — Medication 1 GRAM(S): at 12:42

## 2019-04-16 RX ADMIN — URSODIOL 300 MILLIGRAM(S): 250 TABLET, FILM COATED ORAL at 05:53

## 2019-04-16 RX ADMIN — PANTOPRAZOLE SODIUM 40 MILLIGRAM(S): 20 TABLET, DELAYED RELEASE ORAL at 17:31

## 2019-04-16 RX ADMIN — VALGANCICLOVIR 450 MILLIGRAM(S): 450 TABLET, FILM COATED ORAL at 21:39

## 2019-04-16 RX ADMIN — HYDROMORPHONE HYDROCHLORIDE 2 MILLIGRAM(S): 2 INJECTION INTRAMUSCULAR; INTRAVENOUS; SUBCUTANEOUS at 09:17

## 2019-04-16 RX ADMIN — Medication 1 TABLET(S): at 12:42

## 2019-04-16 RX ADMIN — HYDROMORPHONE HYDROCHLORIDE 2 MILLIGRAM(S): 2 INJECTION INTRAMUSCULAR; INTRAVENOUS; SUBCUTANEOUS at 23:31

## 2019-04-16 RX ADMIN — Medication 5 MILLILITER(S): at 23:01

## 2019-04-16 RX ADMIN — LEVETIRACETAM 500 MILLIGRAM(S): 250 TABLET, FILM COATED ORAL at 17:32

## 2019-04-16 RX ADMIN — URSODIOL 300 MILLIGRAM(S): 250 TABLET, FILM COATED ORAL at 17:31

## 2019-04-16 RX ADMIN — Medication 5 MILLILITER(S): at 12:42

## 2019-04-16 RX ADMIN — Medication 5 MILLILITER(S): at 09:17

## 2019-04-16 RX ADMIN — POTASSIUM PHOSPHATE, MONOBASIC POTASSIUM PHOSPHATE, DIBASIC 62.5 MILLIMOLE(S): 236; 224 INJECTION, SOLUTION INTRAVENOUS at 09:26

## 2019-04-16 RX ADMIN — TACROLIMUS 1 MILLIGRAM(S): 5 CAPSULE ORAL at 21:40

## 2019-04-16 RX ADMIN — Medication 5 MILLILITER(S): at 15:57

## 2019-04-16 RX ADMIN — Medication 125 MILLIGRAM(S): at 23:01

## 2019-04-16 RX ADMIN — Medication 90 MILLIGRAM(S): at 05:53

## 2019-04-16 RX ADMIN — HYDROMORPHONE HYDROCHLORIDE 2 MILLIGRAM(S): 2 INJECTION INTRAMUSCULAR; INTRAVENOUS; SUBCUTANEOUS at 09:32

## 2019-04-16 RX ADMIN — LEVETIRACETAM 500 MILLIGRAM(S): 250 TABLET, FILM COATED ORAL at 05:53

## 2019-04-16 RX ADMIN — Medication 25 MILLIGRAM(S): at 21:40

## 2019-04-16 RX ADMIN — TACROLIMUS 1 MILLIGRAM(S): 5 CAPSULE ORAL at 10:01

## 2019-04-16 RX ADMIN — Medication 1 GRAM(S): at 17:30

## 2019-04-16 RX ADMIN — SIMETHICONE 80 MILLIGRAM(S): 80 TABLET, CHEWABLE ORAL at 05:53

## 2019-04-16 RX ADMIN — HYDROMORPHONE HYDROCHLORIDE 2 MILLIGRAM(S): 2 INJECTION INTRAMUSCULAR; INTRAVENOUS; SUBCUTANEOUS at 04:46

## 2019-04-16 RX ADMIN — HYDROMORPHONE HYDROCHLORIDE 2 MILLIGRAM(S): 2 INJECTION INTRAMUSCULAR; INTRAVENOUS; SUBCUTANEOUS at 23:01

## 2019-04-16 RX ADMIN — SIMETHICONE 80 MILLIGRAM(S): 80 TABLET, CHEWABLE ORAL at 17:31

## 2019-04-16 RX ADMIN — ATOVAQUONE 750 MILLIGRAM(S): 750 SUSPENSION ORAL at 17:30

## 2019-04-16 RX ADMIN — SIMETHICONE 80 MILLIGRAM(S): 80 TABLET, CHEWABLE ORAL at 23:01

## 2019-04-16 RX ADMIN — PANTOPRAZOLE SODIUM 40 MILLIGRAM(S): 20 TABLET, DELAYED RELEASE ORAL at 05:52

## 2019-04-16 RX ADMIN — SIMETHICONE 80 MILLIGRAM(S): 80 TABLET, CHEWABLE ORAL at 12:42

## 2019-04-16 RX ADMIN — Medication 125 MILLIGRAM(S): at 17:30

## 2019-04-16 RX ADMIN — Medication 125 MILLIGRAM(S): at 05:52

## 2019-04-16 RX ADMIN — HYDROMORPHONE HYDROCHLORIDE 2 MILLIGRAM(S): 2 INJECTION INTRAMUSCULAR; INTRAVENOUS; SUBCUTANEOUS at 18:32

## 2019-04-16 RX ADMIN — CHLORHEXIDINE GLUCONATE 1 APPLICATION(S): 213 SOLUTION TOPICAL at 05:53

## 2019-04-16 RX ADMIN — Medication 1 GRAM(S): at 23:01

## 2019-04-16 RX ADMIN — HYDROMORPHONE HYDROCHLORIDE 2 MILLIGRAM(S): 2 INJECTION INTRAMUSCULAR; INTRAVENOUS; SUBCUTANEOUS at 05:16

## 2019-04-16 RX ADMIN — Medication 125 MILLIGRAM(S): at 12:42

## 2019-04-16 RX ADMIN — Medication 10 MILLIGRAM(S): at 12:42

## 2019-04-16 RX ADMIN — HYDROMORPHONE HYDROCHLORIDE 2 MILLIGRAM(S): 2 INJECTION INTRAMUSCULAR; INTRAVENOUS; SUBCUTANEOUS at 18:17

## 2019-04-16 RX ADMIN — ATOVAQUONE 750 MILLIGRAM(S): 750 SUSPENSION ORAL at 05:52

## 2019-04-16 RX ADMIN — Medication 1 MILLIGRAM(S): at 12:42

## 2019-04-16 RX ADMIN — Medication 10 MILLIGRAM(S): at 05:52

## 2019-04-16 RX ADMIN — HYDROMORPHONE HYDROCHLORIDE 2 MILLIGRAM(S): 2 INJECTION INTRAMUSCULAR; INTRAVENOUS; SUBCUTANEOUS at 14:18

## 2019-04-16 NOTE — PROGRESS NOTE ADULT - SUBJECTIVE AND OBJECTIVE BOX
HPC Transplant Team                                                      Critical / Counseling Time Provided: 30 minutes                                                                                                                                                        Chief Complaint: indigestion, pain, in epigastric area.   S: Patient seen and examined with [facility] Transplant Team:     appetite and energy better, walking around unit.     Denies mouth / tongue / throat pain, dyspnea, cough, vomiting, diarrhea   O: Vitals:   Vital Signs Last 24 Hrs  T(C): 36.2 (2019 05:21), Max: 36.8 (15 Apr 2019 22:31)  T(F): 97.1 (2019 05:21), Max: 98.3 (15 Apr 2019 22:31)  HR: 83 (2019 05:21) (78 - 98)  BP: 143/80 (2019 05:21) (125/69 - 159/94)  BP(mean): --  RR: 18 (2019 05:21) (17 - 18)  SpO2: 99% (2019 05:21) (95% - 100%)    Admit weight: 104.3kg  Daily     Daily Weight in k (15 Apr 2019 10:00)    Intake / Output:   04-15 @ 07:01  -  04-16 @ 07:00  --------------------------------------------------------  IN: 1850 mL / OUT: 700 mL / NET: 1150 mL    PE: NAD omaya shunt  Oropharynx: Clear  Oral Mucositis:   clear                                                     Grade:   CVS: RRR  Lungs: CTA  Abdomen: + BS, soft, non distended,    Gastric Mucositis:   -                                               Grade:   Intestinal Mucositis:   -                                           Grade:   Extremities: +1 pitting edema  Skin: No rash  Line: R PICC C/D/I  Neuro: Awake alert and oriented    Labs:   CBC Full  -  ( 2019 06:54 )  WBC Count : 7.8 K/uL  Hemoglobin : 9.2 g/dL  Hematocrit : 29.8 %  Platelet Count - Automated : 88 K/uL  Mean Cell Volume : 104.0 fl  Mean Cell Hemoglobin : 32.2 pg  Mean Cell Hemoglobin Concentration : 30.9 gm/dL  Auto Neutrophil # : x  Auto Lymphocyte # : x  Auto Monocyte # : x  Auto Eosinophil # : x  Auto Basophil # : x  Auto Neutrophil % : x  Auto Lymphocyte % : x  Auto Monocyte % : x  Auto Eosinophil % : x  Auto Basophil % : x                          9.2    7.8   )-----------( 88       ( 2019 06:54 )             29.8         141  |  106  |  22  ----------------------------<  100<H>  4.3   |  26  |  0.88    Ca    9.4      2019 06:49  Phos  2.0       Mg     1.8         TPro  4.7<L>  /  Alb  2.8<L>  /  TBili  0.3  /  DBili  x   /  AST  64<H>  /  ALT  76<H>  /  AlkPhos  219<H>      PT/INR - ( 15 Apr 2019 07:35 )   PT: 10.2 sec;   INR: 0.89 ratio         PTT - ( 15 Apr 2019 07:35 )  PTT:27.6 sec  LIVER FUNCTIONS - ( 2019 06:49 )  Alb: 2.8 g/dL / Pro: 4.7 g/dL / ALK PHOS: 219 U/L / ALT: 76 U/L / AST: 64 U/L / GGT: x           Lactate Dehydrogenase, Serum: 288 U/L ( @ 06:49)       04-15 @ 11:21  Tacrolimus 4.8                Cyclosporine --      Meds:   Antimicrobials:   atovaquone Suspension 750 milliGRAM(s) Oral two times a day  valGANciclovir 450 milliGRAM(s) Oral two times a day  vancomycin    Solution 125 milliGRAM(s) Oral every 6 hours      GI:  metoclopramide 10 milliGRAM(s) Oral every 6 hours  pantoprazole    Tablet 40 milliGRAM(s) Oral every 12 hours  simethicone 80 milliGRAM(s) Chew every 6 hours  sucralfate suspension 1 Gram(s) Oral every 6 hours  ursodiol Capsule 300 milliGRAM(s) Oral two times a day    Immunologic:   tacrolimus 1 milliGRAM(s) Oral <User Schedule>      Other medications:   amitriptyline 25 milliGRAM(s) Oral at bedtime  Biotene Dry Mouth Oral Rinse 5 milliLiter(s) Swish and Spit five times a day  chlorhexidine 4% Liquid 1 Application(s) Topical <User Schedule>  folic acid 1 milliGRAM(s) Oral daily  levETIRAcetam 500 milliGRAM(s) Oral two times a day  methylPREDNISolone sodium succinate Injectable 90 milliGRAM(s) IV Push every 12 hours  multivitamin 1 Tablet(s) Oral daily      PRN:   HYDROmorphone  Injectable 2 milliGRAM(s) IV Push every 4 hours PRN  ondansetron    Tablet 8 milliGRAM(s) Oral three times a day PRN  ondansetron Injectable 8 milliGRAM(s) IV Push every 6 hours PRN  sodium chloride 0.9% lock flush 10 milliLiter(s) IV Push every 1 hour PRN      A/P:   59 year old F with a history of relapsed ALL  Status Post Allogeneic PBSCT 2019, 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 Amylase and lipase WNL, continue clears   s/p Calorie count x 72 hours (4/3-), reglan IVP pre-meals  Discontinued Cefepime and MMF on 4/3, continue on Tacro.    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   Abdominal x-ray for worsening pain unremarkable.  GI consulted.  Repeat amylase/lipase WNL.  Started  carafate/reglan/mylicon/IV Protonix for presumed gastritis.  repeat CT with oral and IV contrast c/w ascitics, likely  related to virus vs gvhd, started solumedrol 100 mg IV BID   PICC placed  Decrease solumedrol 90mg BID 4/15    1. Infectious Disease:   atovaquone Suspension 750 milliGRAM(s) Oral two times a day  valGANciclovir 450 milliGRAM(s) Oral two times a day  vancomycin    Solution 125 milliGRAM(s) Oral every 6 hours    2. VOD Prophylaxis: Actigall, Glutamine    3. GI Prophylaxis:   metoclopramide 10 milliGRAM(s) Oral every 6 hours  pantoprazole    Tablet 40 milliGRAM(s) Oral every 12 hours  simethicone 80 milliGRAM(s) Chew every 6 hours  sucralfate suspension 1 Gram(s) Oral every 6 hours  ursodiol Capsule 300 milliGRAM(s) Oral two times a day    4. Mouthcare - NS / NaHCO3 rinses, Mycelex, Biotene, skin care     5. GVHD prophylaxis   Continue Tacro 1mg bid    6. Transfuse & replete electrolytes prn    uric acid 7.2, monitor closely     7. IV hydration, daily weights, strict I&O, prn diuresis     8. PO intake as tolerated, nutrition follow up as needed, MVI, folic acid   Advanced to soft diet as tolerated + Ensure Enlive BID     9. Antiemetics, anti-diarrhea medications:   Reglan, Ativan    10. OOB as tolerated, physical therapy consult if needed     11. Monitor coags / fibrinogen 2x week, vitamin K as needed     12. Monitor closely for clinical changes, monitor for fevers     13. Emotional support provided, plan of care discussed and questions addressed     14. Patient education done regarding chemotherapy prep, plan of care, restrictions and discharge planning   15. Continue regular social work input     I have written the above note for Dr. Rene who performed service with me in the room.   Nataliya Martinez NP-C ([phone]) HPC Transplant Team                                                      Critical / Counseling Time Provided: 30 minutes                                                                                                                                                        Chief Complaint: indigestion, pain, in epigastric area.     S: Patient seen and examined with [facility] Transplant Team:   + fatigue, generalized weakness   appetite and energy better, walking around unit.   Denies mouth / tongue / throat pain, dyspnea, cough, vomiting, diarrhea     O: Vitals:   Vital Signs Last 24 Hrs  T(C): 36.2 (2019 05:21), Max: 36.8 (15 Apr 2019 22:31)  T(F): 97.1 (2019 05:21), Max: 98.3 (15 Apr 2019 22:31)  HR: 83 (2019 05:21) (78 - 98)  BP: 143/80 (2019 05:21) (125/69 - 159/94)  BP(mean): --  RR: 18 (2019 05:21) (17 - 18)  SpO2: 99% (2019 05:21) (95% - 100%)    Admit weight: 104.3kg  Daily Weight in k (15 Apr 2019 10:00)  Today's weight: 109.9kg     Intake / Output:   -15 @ 07:01  -  -16 @ 07:00  --------------------------------------------------------  IN: 1850 mL / OUT: 700 mL / NET: 1150 mL    PE:   Oropharynx: no erythema or ulcerations  Oral Mucositis:   -                                                    Grade: n/a  CVS: RRR, S1, S2 RRR  Lungs: CTA throughout bilaterally   Abdomen: + BS, soft, non distended,    Gastric Mucositis:   -                                               Grade: n/a  Intestinal Mucositis:   -                                           Grade: n/a   Extremities: +1 pitting edema  Skin: No rash  Line: R PICC C/D/I  Neuro: Awake alert and oriented    Labs:   CBC Full  -  ( 2019 06:54 )  WBC Count : 7.8 K/uL  Hemoglobin : 9.2 g/dL  Hematocrit : 29.8 %  Platelet Count - Automated : 88 K/uL  Mean Cell Volume : 104.0 fl  Mean Cell Hemoglobin : 32.2 pg  Mean Cell Hemoglobin Concentration : 30.9 gm/dL  Auto Neutrophil # : x  Auto Lymphocyte # : x  Auto Monocyte # : x  Auto Eosinophil # : x  Auto Basophil # : x  Auto Neutrophil % : x  Auto Lymphocyte % : x  Auto Monocyte % : x  Auto Eosinophil % : x  Auto Basophil % : x                          9.2    7.8   )-----------( 88       ( 2019 06:54 )             29.8         141  |  106  |  22  ----------------------------<  100<H>  4.3   |  26  |  0.88    Ca    9.4      2019 06:49  Phos  2.0       Mg     1.8         TPro  4.7<L>  /  Alb  2.8<L>  /  TBili  0.3  /  DBili  x   /  AST  64<H>  /  ALT  76<H>  /  AlkPhos  219<H>      PT/INR - ( 15 Apr 2019 07:35 )   PT: 10.2 sec;   INR: 0.89 ratio         PTT - ( 15 Apr 2019 07:35 )  PTT:27.6 sec  LIVER FUNCTIONS - ( 2019 06:49 )  Alb: 2.8 g/dL / Pro: 4.7 g/dL / ALK PHOS: 219 U/L / ALT: 76 U/L / AST: 64 U/L / GGT: x           Lactate Dehydrogenase, Serum: 288 U/L ( @ 06:49)       04-15 @ 11:21  Tacrolimus 4.8                Cyclosporine --      Meds:   Antimicrobials:   atovaquone Suspension 750 milliGRAM(s) Oral two times a day  valGANciclovir 450 milliGRAM(s) Oral two times a day  vancomycin    Solution 125 milliGRAM(s) Oral every 6 hours      GI:  metoclopramide 10 milliGRAM(s) Oral every 6 hours  pantoprazole    Tablet 40 milliGRAM(s) Oral every 12 hours  simethicone 80 milliGRAM(s) Chew every 6 hours  sucralfate suspension 1 Gram(s) Oral every 6 hours  ursodiol Capsule 300 milliGRAM(s) Oral two times a day    Immunologic:   tacrolimus 1 milliGRAM(s) Oral <User Schedule>      Other medications:   amitriptyline 25 milliGRAM(s) Oral at bedtime  Biotene Dry Mouth Oral Rinse 5 milliLiter(s) Swish and Spit five times a day  chlorhexidine 4% Liquid 1 Application(s) Topical <User Schedule>  folic acid 1 milliGRAM(s) Oral daily  levETIRAcetam 500 milliGRAM(s) Oral two times a day  methylPREDNISolone sodium succinate Injectable 90 milliGRAM(s) IV Push every 12 hours  multivitamin 1 Tablet(s) Oral daily      PRN:   HYDROmorphone  Injectable 2 milliGRAM(s) IV Push every 4 hours PRN  ondansetron    Tablet 8 milliGRAM(s) Oral three times a day PRN  ondansetron Injectable 8 milliGRAM(s) IV Push every 6 hours PRN  sodium chloride 0.9% lock flush 10 milliLiter(s) IV Push every 1 hour PRN      A/P:   59 year old F with a history of relapsed ALL  Status Post Allogeneic PBSCT 2019, 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 Amylase and lipase WNL, continue clears   s/p Calorie count x 72 hours (4/3-), reglan IVP pre-meals  Discontinued Cefepime and MMF on 4/3, continue on Tacro.    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   Abdominal x-ray for worsening pain unremarkable.  GI consulted.  Repeat amylase/lipase WNL.  Started  carafate/reglan/mylicon/IV Protonix for presumed gastritis.  repeat CT with oral and IV contrast c/w ascitics, likely  related to virus vs gvhd, started solumedrol 100 mg IV BID   PICC placed  Decrease solumedrol 90mg BID 4/15    1. Infectious Disease:   atovaquone Suspension 750 milliGRAM(s) Oral two times a day  valGANciclovir 450 milliGRAM(s) Oral two times a day  vancomycin    Solution 125 milliGRAM(s) Oral every 6 hours    2. VOD Prophylaxis: Actigall, Glutamine    3. GI Prophylaxis:   metoclopramide 10 milliGRAM(s) Oral every 6 hours  pantoprazole    Tablet 40 milliGRAM(s) Oral every 12 hours  simethicone 80 milliGRAM(s) Chew every 6 hours  sucralfate suspension 1 Gram(s) Oral every 6 hours  ursodiol Capsule 300 milliGRAM(s) Oral two times a day    4. Mouthcare - NS / NaHCO3 rinses, Mycelex, Biotene, skin care     5. GVHD prophylaxis   Continue Tacro 1mg bid    6. Transfuse & replete electrolytes prn    uric acid 7.2, monitor closely     7. IV hydration, daily weights, strict I&O, prn diuresis     8. PO intake as tolerated, nutrition follow up as needed, MVI, folic acid   Advanced to soft diet as tolerated + Ensure Enlive BID     9. Antiemetics, anti-diarrhea medications:   Reglan, Ativan    10. OOB as tolerated, physical therapy consult if needed     11. Monitor coags / fibrinogen 2x week, vitamin K as needed     12. Monitor closely for clinical changes, monitor for fevers     13. Emotional support provided, plan of care discussed and questions addressed     14. Patient education done regarding chemotherapy prep, plan of care, restrictions and discharge planning   15. Continue regular social work input     I have written the above note for Dr. Rene who performed service with me in the room.   Nataliya Martinez NP-C ([phone])

## 2019-04-16 NOTE — PROGRESS NOTE ADULT - ATTENDING COMMENTS
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  cmv positive ct with vague findings in pancreas amylase and lipase nl unclear etiology of upper GI discomfort was improving slowly. Patient complaints of abdominal pain localized to the epigastric area and left upper quadrant. 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     ID: valcyte 450 bid, cmv pcr stable since 4/5. Repeat CMV  pcr planned for twice weekly. PCR 4/8/19 [id number]. 4/12/19 pending.   cont valcyte 450 bid  non-neutropenic on px abx  on empiric po vanco  125 mg q 6hrs for c diff being tapered slowly   Tacro level therapeutic, cont tacro 1 mg bid  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 Continue  full diet, tolerated well.   Most recent FISH for Y on  100% donor.    Continue Actigall for VOD prevention  Continue Keppra for h/o seizures  Continue Mepron for PCP ppx  Continue supportive care possible d/c end of  week  On dilaudid 2 mg q4hr   Continue carafate, protonix.  Ambulation as tolerated. 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  cmv positive ct with vague findings in pancreas amylase and lipase nl unclear etiology of upper GI discomfort was improving slowly. Patient complaints of abdominal pain localized to the epigastric area and left upper quadrant. 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     ID: valcyte 450 bid, cmv pcr stable since 4/5.  PCR 4/8/19 [id number]. 4/12/19 negative.  cont valcyte 450 bid  non-neutropenic on px abx  on empiric po vanco  125 mg q 6hrs for c diff being tapered slowly   Tacro level therapeutic, cont tacro 1 mg bid. Level 4/15/19 4.8 level was ordered for today pending.   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 Continue  full diet, tolerated well.   Most recent FISH for Y on  100% donor.    Continue Actigall for VOD prevention  Continue Keppra for h/o seizures  Continue Mepron for PCP ppx  Continue supportive care.   On dilaudid 2 mg q4hr   Continue carafate, protonix.  Patient is ambulating with assistance, still has mild tenderness in the RU and RLQ.

## 2019-04-17 LAB
ALBUMIN SERPL ELPH-MCNC: 2.7 G/DL — LOW (ref 3.3–5)
ALP SERPL-CCNC: 181 U/L — HIGH (ref 40–120)
ALT FLD-CCNC: 90 U/L — HIGH (ref 10–45)
ANION GAP SERPL CALC-SCNC: 11 MMOL/L — SIGNIFICANT CHANGE UP (ref 5–17)
AST SERPL-CCNC: 75 U/L — HIGH (ref 10–40)
BASOPHILS # BLD AUTO: 0 K/UL — SIGNIFICANT CHANGE UP (ref 0–0.2)
BILIRUB SERPL-MCNC: 0.2 MG/DL — SIGNIFICANT CHANGE UP (ref 0.2–1.2)
BLD GP AB SCN SERPL QL: NEGATIVE — SIGNIFICANT CHANGE UP
BUN SERPL-MCNC: 20 MG/DL — SIGNIFICANT CHANGE UP (ref 7–23)
CALCIUM SERPL-MCNC: 9.2 MG/DL — SIGNIFICANT CHANGE UP (ref 8.4–10.5)
CHLORIDE SERPL-SCNC: 105 MMOL/L — SIGNIFICANT CHANGE UP (ref 96–108)
CO2 SERPL-SCNC: 26 MMOL/L — SIGNIFICANT CHANGE UP (ref 22–31)
CREAT SERPL-MCNC: 0.83 MG/DL — SIGNIFICANT CHANGE UP (ref 0.5–1.3)
EOSINOPHIL # BLD AUTO: 0 K/UL — SIGNIFICANT CHANGE UP (ref 0–0.5)
GLUCOSE SERPL-MCNC: 71 MG/DL — SIGNIFICANT CHANGE UP (ref 70–99)
HCT VFR BLD CALC: 28.5 % — LOW (ref 34.5–45)
HGB BLD-MCNC: 9.3 G/DL — LOW (ref 11.5–15.5)
LDH SERPL L TO P-CCNC: 282 U/L — HIGH (ref 50–242)
LYMPHOCYTES # BLD AUTO: 0.5 K/UL — LOW (ref 1–3.3)
LYMPHOCYTES # BLD AUTO: 8 % — LOW (ref 13–44)
MAGNESIUM SERPL-MCNC: 1.7 MG/DL — SIGNIFICANT CHANGE UP (ref 1.6–2.6)
MCHC RBC-ENTMCNC: 32.5 GM/DL — SIGNIFICANT CHANGE UP (ref 32–36)
MCHC RBC-ENTMCNC: 33.9 PG — SIGNIFICANT CHANGE UP (ref 27–34)
MCV RBC AUTO: 104 FL — HIGH (ref 80–100)
MONOCYTES # BLD AUTO: 0.2 K/UL — SIGNIFICANT CHANGE UP (ref 0–0.9)
MONOCYTES NFR BLD AUTO: 6 % — SIGNIFICANT CHANGE UP (ref 2–14)
NEUTROPHILS # BLD AUTO: 4.8 K/UL — SIGNIFICANT CHANGE UP (ref 1.8–7.4)
NEUTROPHILS NFR BLD AUTO: 86 % — HIGH (ref 43–77)
PHOSPHATE SERPL-MCNC: 2.6 MG/DL — SIGNIFICANT CHANGE UP (ref 2.5–4.5)
PLATELET # BLD AUTO: 79 K/UL — LOW (ref 150–400)
POTASSIUM SERPL-MCNC: 4.4 MMOL/L — SIGNIFICANT CHANGE UP (ref 3.5–5.3)
POTASSIUM SERPL-SCNC: 4.4 MMOL/L — SIGNIFICANT CHANGE UP (ref 3.5–5.3)
PROT SERPL-MCNC: 4.5 G/DL — LOW (ref 6–8.3)
RBC # BLD: 2.74 M/UL — LOW (ref 3.8–5.2)
RBC # FLD: 18.7 % — HIGH (ref 10.3–14.5)
RH IG SCN BLD-IMP: POSITIVE — SIGNIFICANT CHANGE UP
SODIUM SERPL-SCNC: 142 MMOL/L — SIGNIFICANT CHANGE UP (ref 135–145)
TACROLIMUS SERPL-MCNC: 3.4 NG/ML — SIGNIFICANT CHANGE UP
URATE SERPL-MCNC: 5.6 MG/DL — SIGNIFICANT CHANGE UP (ref 2.5–7)
WBC # BLD: 5.6 K/UL — SIGNIFICANT CHANGE UP (ref 3.8–10.5)
WBC # FLD AUTO: 5.6 K/UL — SIGNIFICANT CHANGE UP (ref 3.8–10.5)

## 2019-04-17 PROCEDURE — 99291 CRITICAL CARE FIRST HOUR: CPT

## 2019-04-17 RX ORDER — TACROLIMUS 5 MG/1
0.5 CAPSULE ORAL ONCE
Qty: 0 | Refills: 0 | Status: COMPLETED | OUTPATIENT
Start: 2019-04-17 | End: 2019-04-17

## 2019-04-17 RX ORDER — ONDANSETRON 8 MG/1
8 TABLET, FILM COATED ORAL EVERY 8 HOURS
Qty: 0 | Refills: 0 | Status: DISCONTINUED | OUTPATIENT
Start: 2019-04-17 | End: 2019-04-19

## 2019-04-17 RX ORDER — TACROLIMUS 5 MG/1
1.5 CAPSULE ORAL
Qty: 0 | Refills: 0 | Status: DISCONTINUED | OUTPATIENT
Start: 2019-04-17 | End: 2019-04-19

## 2019-04-17 RX ADMIN — Medication 5 MILLILITER(S): at 09:14

## 2019-04-17 RX ADMIN — HYDROMORPHONE HYDROCHLORIDE 2 MILLIGRAM(S): 2 INJECTION INTRAMUSCULAR; INTRAVENOUS; SUBCUTANEOUS at 16:48

## 2019-04-17 RX ADMIN — Medication 5 MILLILITER(S): at 23:08

## 2019-04-17 RX ADMIN — LEVETIRACETAM 500 MILLIGRAM(S): 250 TABLET, FILM COATED ORAL at 05:22

## 2019-04-17 RX ADMIN — Medication 5 MILLILITER(S): at 17:46

## 2019-04-17 RX ADMIN — ONDANSETRON 8 MILLIGRAM(S): 8 TABLET, FILM COATED ORAL at 10:08

## 2019-04-17 RX ADMIN — Medication 5 MILLILITER(S): at 21:38

## 2019-04-17 RX ADMIN — Medication 125 MILLIGRAM(S): at 23:08

## 2019-04-17 RX ADMIN — VALGANCICLOVIR 450 MILLIGRAM(S): 450 TABLET, FILM COATED ORAL at 09:14

## 2019-04-17 RX ADMIN — HYDROMORPHONE HYDROCHLORIDE 2 MILLIGRAM(S): 2 INJECTION INTRAMUSCULAR; INTRAVENOUS; SUBCUTANEOUS at 22:17

## 2019-04-17 RX ADMIN — Medication 10 MILLIGRAM(S): at 17:47

## 2019-04-17 RX ADMIN — LEVETIRACETAM 500 MILLIGRAM(S): 250 TABLET, FILM COATED ORAL at 17:47

## 2019-04-17 RX ADMIN — PANTOPRAZOLE SODIUM 40 MILLIGRAM(S): 20 TABLET, DELAYED RELEASE ORAL at 05:22

## 2019-04-17 RX ADMIN — Medication 1 GRAM(S): at 05:22

## 2019-04-17 RX ADMIN — Medication 1 GRAM(S): at 12:09

## 2019-04-17 RX ADMIN — VALGANCICLOVIR 450 MILLIGRAM(S): 450 TABLET, FILM COATED ORAL at 21:50

## 2019-04-17 RX ADMIN — HYDROMORPHONE HYDROCHLORIDE 2 MILLIGRAM(S): 2 INJECTION INTRAMUSCULAR; INTRAVENOUS; SUBCUTANEOUS at 03:09

## 2019-04-17 RX ADMIN — URSODIOL 300 MILLIGRAM(S): 250 TABLET, FILM COATED ORAL at 05:22

## 2019-04-17 RX ADMIN — Medication 125 MILLIGRAM(S): at 05:22

## 2019-04-17 RX ADMIN — Medication 125 MILLIGRAM(S): at 12:07

## 2019-04-17 RX ADMIN — ATOVAQUONE 750 MILLIGRAM(S): 750 SUSPENSION ORAL at 17:47

## 2019-04-17 RX ADMIN — HYDROMORPHONE HYDROCHLORIDE 2 MILLIGRAM(S): 2 INJECTION INTRAMUSCULAR; INTRAVENOUS; SUBCUTANEOUS at 21:47

## 2019-04-17 RX ADMIN — PANTOPRAZOLE SODIUM 40 MILLIGRAM(S): 20 TABLET, DELAYED RELEASE ORAL at 17:46

## 2019-04-17 RX ADMIN — Medication 125 MILLIGRAM(S): at 17:47

## 2019-04-17 RX ADMIN — Medication 10 MILLIGRAM(S): at 12:07

## 2019-04-17 RX ADMIN — Medication 80 MILLIGRAM(S): at 17:46

## 2019-04-17 RX ADMIN — Medication 1 TABLET(S): at 12:07

## 2019-04-17 RX ADMIN — Medication 1 GRAM(S): at 17:47

## 2019-04-17 RX ADMIN — HYDROMORPHONE HYDROCHLORIDE 2 MILLIGRAM(S): 2 INJECTION INTRAMUSCULAR; INTRAVENOUS; SUBCUTANEOUS at 13:24

## 2019-04-17 RX ADMIN — SIMETHICONE 80 MILLIGRAM(S): 80 TABLET, CHEWABLE ORAL at 17:46

## 2019-04-17 RX ADMIN — Medication 1 GRAM(S): at 23:08

## 2019-04-17 RX ADMIN — CHLORHEXIDINE GLUCONATE 1 APPLICATION(S): 213 SOLUTION TOPICAL at 05:24

## 2019-04-17 RX ADMIN — SIMETHICONE 80 MILLIGRAM(S): 80 TABLET, CHEWABLE ORAL at 05:22

## 2019-04-17 RX ADMIN — SIMETHICONE 80 MILLIGRAM(S): 80 TABLET, CHEWABLE ORAL at 23:08

## 2019-04-17 RX ADMIN — TACROLIMUS 1.5 MILLIGRAM(S): 5 CAPSULE ORAL at 21:50

## 2019-04-17 RX ADMIN — SIMETHICONE 80 MILLIGRAM(S): 80 TABLET, CHEWABLE ORAL at 12:07

## 2019-04-17 RX ADMIN — TACROLIMUS 0.5 MILLIGRAM(S): 5 CAPSULE ORAL at 12:07

## 2019-04-17 RX ADMIN — Medication 1 MILLIGRAM(S): at 12:07

## 2019-04-17 RX ADMIN — Medication 5 MILLILITER(S): at 12:07

## 2019-04-17 RX ADMIN — Medication 80 MILLIGRAM(S): at 05:22

## 2019-04-17 RX ADMIN — URSODIOL 300 MILLIGRAM(S): 250 TABLET, FILM COATED ORAL at 17:46

## 2019-04-17 RX ADMIN — ATOVAQUONE 750 MILLIGRAM(S): 750 SUSPENSION ORAL at 05:22

## 2019-04-17 RX ADMIN — Medication 25 MILLIGRAM(S): at 21:50

## 2019-04-17 RX ADMIN — HYDROMORPHONE HYDROCHLORIDE 2 MILLIGRAM(S): 2 INJECTION INTRAMUSCULAR; INTRAVENOUS; SUBCUTANEOUS at 17:41

## 2019-04-17 RX ADMIN — Medication 10 MILLIGRAM(S): at 05:22

## 2019-04-17 RX ADMIN — HYDROMORPHONE HYDROCHLORIDE 2 MILLIGRAM(S): 2 INJECTION INTRAMUSCULAR; INTRAVENOUS; SUBCUTANEOUS at 03:39

## 2019-04-17 RX ADMIN — HYDROMORPHONE HYDROCHLORIDE 2 MILLIGRAM(S): 2 INJECTION INTRAMUSCULAR; INTRAVENOUS; SUBCUTANEOUS at 08:59

## 2019-04-17 RX ADMIN — Medication 10 MILLIGRAM(S): at 23:08

## 2019-04-17 RX ADMIN — TACROLIMUS 1 MILLIGRAM(S): 5 CAPSULE ORAL at 09:14

## 2019-04-17 NOTE — PROGRESS NOTE ADULT - ATTENDING COMMENTS
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  cmv positive ct with vague findings in pancreas amylase and lipase nl unclear etiology of upper GI discomfort was improving slowly. Patient complaints of abdominal pain localized to the epigastric area and left upper quadrant. 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     ID: valcyte 450 bid, cmv pcr stable since 4/5.  PCR 4/8/19 23817. 4/12/19 negative.  cont valcyte 450 bid  non-neutropenic on px abx  on empiric po vanco  125 mg q 6hrs for c diff being tapered slowly   Tacro level therapeutic, cont tacro 1 mg bid. Level 4/15/19 4.8 level was ordered for today pending.   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 Continue  full diet, tolerated well.   Most recent FISH for Y on  100% donor.    Continue Actigall for VOD prevention  Continue Keppra for h/o seizures  Continue Mepron for PCP ppx  Continue supportive care.   On dilaudid 2 mg q4hr   Continue carafate, protonix.  Patient is ambulating with assistance, still has mild tenderness in the RU and RLQ. 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  cmv positive ct with vague findings in pancreas amylase and lipase nl unclear etiology of upper GI discomfort was improving slowly. Patient complaints of abdominal pain localized to the epigastric area and left upper quadrant. 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     ID: valcyte 450 bid, cmv pcr stable since 4/5.  PCR 4/8/19 38886. 4/15/19 1211.  cont valcyte 450 bid  non-neutropenic on px abx  on empiric po vanco  125 mg q 6hrs for c diff being tapered slowly   Tacro 3.4 (4/17/19) increased to 1.5 mg BID, recheck Friday.    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 Plan to switch to oral steroids tomorrow.   Continue  full diet, tolerated well.   Most recent FISH for Y on  100% donor.    Continue Actigall for VOD prevention  Continue Keppra for h/o seizures  Continue Mepron for PCP ppx  Continue supportive care.   On dilaudid 2 mg q4hr   Continue carafate, protonix.  Patient is ambulating with by herself, tolerating meals, denies N/V or diarrhea. Tentatively scheduled for discharge on 4/19/19.

## 2019-04-17 NOTE — PROGRESS NOTE ADULT - SUBJECTIVE AND OBJECTIVE BOX
[facility] Transplant Team                                                      Critical / Counseling Time Provided: 30 minutes                                                                                                                                                        Chief Complaint: indigestion, pain, in epigastric area.     S: Patient seen and examined with [facility] Transplant Team:   + fatigue, generalized weakness   appetite and energy better, walking around unit.     Denies mouth / tongue / throat pain, dyspnea, cough, nausea, vomiting, diarrhea, abdominal pain     O: Vitals:   Vital Signs Last 24 Hrs  T(C): 36.4 (2019 05:38), Max: 37.2 (2019 17:46)  T(F): 97.5 (2019 05:38), Max: 98.9 (2019 17:46)  HR: 77 (2019 05:38) (77 - 98)  BP: 147/86 (2019 05:38) (125/85 - 161/93)  BP(mean): --  RR: 18 (2019 05:38) (17 - 18)  SpO2: 100% (2019 05:38) (98% - 100%)    Admit weight: 104.3 kg   Daily Weight in k.9 (2019 08:34)  Today's weight:     Intake / Output:   04-16 @ 07:01  -  04-17 @ 07:00  --------------------------------------------------------  IN: 1798 mL / OUT: 825 mL / NET: 973 mL        PE:   Oropharynx: no erythema or ulcerations  Oral Mucositis:   -                                                    Grade: n/a  CVS: RRR, S1, S2 RRR  Lungs: CTA throughout bilaterally   Abdomen: + BS, soft, non distended,    Gastric Mucositis:   -                                               Grade: n/a  Intestinal Mucositis:   -                                           Grade: n/a   Extremities: +1 pitting edema  Skin: No rash  Line: R PICC C/D/I  Neuro: alert and oriented x 3       Labs:   CBC Full  -  ( 2019 06:48 )  WBC Count : 5.6 K/uL  Hemoglobin : 9.3 g/dL  Hematocrit : 28.5 %  Platelet Count - Automated : 79 K/uL  Mean Cell Volume : 104.0 fl  Mean Cell Hemoglobin : 33.9 pg  Mean Cell Hemoglobin Concentration : 32.5 gm/dL  Auto Neutrophil # : x  Auto Lymphocyte # : x  Auto Monocyte # : x  Auto Eosinophil # : x  Auto Basophil # : x  Auto Neutrophil % : x  Auto Lymphocyte % : x  Auto Monocyte % : x  Auto Eosinophil % : x  Auto Basophil % : x                          9.3    5.6   )-----------( 79       ( 2019 06:48 )             28.5         142  |  105  |  20  ----------------------------<  71  4.4   |  26  |  0.83    Ca    9.2      2019 06:47  Phos  2.6       Mg     1.7         TPro  4.5<L>  /  Alb  2.7<L>  /  TBili  0.2  /  DBili  x   /  AST  75<H>  /  ALT  90<H>  /  AlkPhos  181<H>        LIVER FUNCTIONS - ( 2019 06:47 )  Alb: 2.7 g/dL / Pro: 4.5 g/dL / ALK PHOS: 181 U/L / ALT: 90 U/L / AST: 75 U/L / GGT: x           Lactate Dehydrogenase, Serum: 282 U/L ( @ 06:47)    Karnofsky / Lansky Scale:   GVHD:   Skin:   Liver:   Gut:   Overall Grade:     Cultures:   Culture - Blood (19 @ 21:18)    Specimen Source: .Blood Blood-Venous    Culture Results:   No growth at 5 days.    Meds:   Antimicrobials:   atovaquone Suspension 750 milliGRAM(s) Oral two times a day  valGANciclovir 450 milliGRAM(s) Oral two times a day  vancomycin    Solution 125 milliGRAM(s) Oral every 6 hours    GI:  metoclopramide 10 milliGRAM(s) Oral every 6 hours  pantoprazole    Tablet 40 milliGRAM(s) Oral every 12 hours  simethicone 80 milliGRAM(s) Chew every 6 hours  sucralfate suspension 1 Gram(s) Oral every 6 hours  ursodiol Capsule 300 milliGRAM(s) Oral two times a day    Immunologic:   tacrolimus 1 milliGRAM(s) Oral <User Schedule>    Other medications:   amitriptyline 25 milliGRAM(s) Oral at bedtime  Biotene Dry Mouth Oral Rinse 5 milliLiter(s) Swish and Spit five times a day  chlorhexidine 4% Liquid 1 Application(s) Topical <User Schedule>  folic acid 1 milliGRAM(s) Oral daily  levETIRAcetam 500 milliGRAM(s) Oral two times a day  methylPREDNISolone sodium succinate Injectable 80 milliGRAM(s) IV Push every 12 hours  multivitamin 1 Tablet(s) Oral daily    PRN:   HYDROmorphone  Injectable 2 milliGRAM(s) IV Push every 4 hours PRN  ondansetron    Tablet 8 milliGRAM(s) Oral three times a day PRN  ondansetron Injectable 8 milliGRAM(s) IV Push every 6 hours PRN  sodium chloride 0.9% lock flush 10 milliLiter(s) IV Push every 1 hour PRN      A/P:   59 year old F with a history of relapsed ALL  Status Post Allogeneic PBSCT 2019, 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 Amylase and lipase WNL, continue clears   s/p Calorie count x 72 hours (4/3-), reglan IVP pre-meals  Discontinued Cefepime and MMF on 4/3, continue on Tacro.    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   Abdominal x-ray for worsening pain unremarkable.  GI consulted.  Repeat amylase/lipase WNL.  Started  carafate/reglan/mylicon/IV Protonix for presumed gastritis.  repeat CT with oral and IV contrast c/w ascitics, likely related to virus vs gvhd, started solumedrol 100 mg IV BID   PICC placed  Decrease solumedrol 90mg BID 4/15    1. Infectious Disease:   atovaquone Suspension 750 milliGRAM(s) Oral two times a day  valGANciclovir 450 milliGRAM(s) Oral two times a day  vancomycin    Solution 125 milliGRAM(s) Oral every 6 hours    2. VOD Prophylaxis: Actigall, Glutamine    3. GI Prophylaxis:   metoclopramide 10 milliGRAM(s) Oral every 6 hours  pantoprazole    Tablet 40 milliGRAM(s) Oral every 12 hours  simethicone 80 milliGRAM(s) Chew every 6 hours  sucralfate suspension 1 Gram(s) Oral every 6 hours  ursodiol Capsule 300 milliGRAM(s) Oral two times a day    4. Mouthcare - NS / NaHCO3 rinses, Mycelex, Biotene, skin care     5. GVHD prophylaxis   Continue Tacro 1mg bid    6. Transfuse & replete electrolytes prn    uric acid 7.2, monitor closely     7. IV hydration, daily weights, strict I&O, prn diuresis     8. PO intake as tolerated, nutrition follow up as needed, MVI, folic acid   Advanced to soft diet as tolerated + Ensure Enlive BID     9. Antiemetics, anti-diarrhea medications:   Reglan, Ativan    10. OOB as tolerated, physical therapy consult if needed     11. Monitor coags / fibrinogen 2x week, vitamin K as needed     12. Monitor closely for clinical changes, monitor for fevers     13. Emotional support provided, plan of care discussed and questions addressed     14. Patient education done regarding chemotherapy prep, plan of care, restrictions and discharge planning     15. Continue regular social work input     I have written the above note for Dr. Rene who performed service with me in the room.   Shelly Clemente NP-C ([phone])    I have seen and examined patient with NP, I agree with above note as scribed. [facility] Transplant Team                                                      Critical / Counseling Time Provided: 30 minutes                                                                                                                                                        Chief Complaint: indigestion, pain, in epigastric area.     S: Patient seen and examined with [facility] Transplant Team:   + fatigue, generalized weakness   appetite and energy better, walking around unit multiple times per day     Denies mouth / tongue / throat pain, dyspnea, cough, nausea, vomiting, diarrhea, abdominal pain     O: Vitals:   Vital Signs Last 24 Hrs  T(C): 36.4 (2019 05:38), Max: 37.2 (2019 17:46)  T(F): 97.5 (2019 05:38), Max: 98.9 (2019 17:46)  HR: 77 (2019 05:38) (77 - 98)  BP: 147/86 (2019 05:38) (125/85 - 161/93)  BP(mean): --  RR: 18 (2019 05:38) (17 - 18)  SpO2: 100% (2019 05:38) (98% - 100%)    Admit weight: 104.3 kg   Daily Weight in k.9 (2019 08:34)  Today's weight: 109.5 kg     Intake / Output:   -16 @ 07:01  -  04-17 @ 07:00  --------------------------------------------------------  IN: 1798 mL / OUT: 825 mL / NET: 973 mL    PE:   Oropharynx: no erythema or ulcerations  Oral Mucositis:   -                                                    Grade: n/a  CVS: RRR, S1, S2 RRR  Lungs: CTA throughout bilaterally   Abdomen: + BS, soft, non distended,    Gastric Mucositis:   -                                               Grade: n/a  Intestinal Mucositis:   -                                           Grade: n/a   Extremities: +1 le edema  Skin: No rash  Line: R PICC C/D/I  Neuro: alert and oriented x 3     Labs:   CBC Full  -  ( 2019 06:48 )  WBC Count : 5.6 K/uL  Hemoglobin : 9.3 g/dL  Hematocrit : 28.5 %  Platelet Count - Automated : 79 K/uL  Mean Cell Volume : 104.0 fl  Mean Cell Hemoglobin : 33.9 pg  Mean Cell Hemoglobin Concentration : 32.5 gm/dL  Auto Neutrophil # : x  Auto Lymphocyte # : x  Auto Monocyte # : x  Auto Eosinophil # : x  Auto Basophil # : x  Auto Neutrophil % : x  Auto Lymphocyte % : x  Auto Monocyte % : x  Auto Eosinophil % : x  Auto Basophil % : x                          9.3    5.6   )-----------( 79       ( 2019 06:48 )             28.5         142  |  105  |  20  ----------------------------<  71  4.4   |  26  |  0.83    Ca    9.2      2019 06:47  Phos  2.6       Mg     1.7         TPro  4.5<L>  /  Alb  2.7<L>  /  TBili  0.2  /  DBili  x   /  AST  75<H>  /  ALT  90<H>  /  AlkPhos  181<H>        LIVER FUNCTIONS - ( 2019 06:47 )  Alb: 2.7 g/dL / Pro: 4.5 g/dL / ALK PHOS: 181 U/L / ALT: 90 U/L / AST: 75 U/L / GGT: x           Lactate Dehydrogenase, Serum: 282 U/L ( @ 06:47)    Karnofsky / Lansky Scale:   GVHD:   Skin:   Liver:   Gut:   Overall Grade:     Cultures:   Culture - Blood (19 @ 21:18)    Specimen Source: .Blood Blood-Venous    Culture Results:   No growth at 5 days.    Meds:   Antimicrobials:   atovaquone Suspension 750 milliGRAM(s) Oral two times a day  valGANciclovir 450 milliGRAM(s) Oral two times a day  vancomycin    Solution 125 milliGRAM(s) Oral every 6 hours    GI:  metoclopramide 10 milliGRAM(s) Oral every 6 hours  pantoprazole    Tablet 40 milliGRAM(s) Oral every 12 hours  simethicone 80 milliGRAM(s) Chew every 6 hours  sucralfate suspension 1 Gram(s) Oral every 6 hours  ursodiol Capsule 300 milliGRAM(s) Oral two times a day    Immunologic:   tacrolimus 1 milliGRAM(s) Oral <User Schedule>    Other medications:   amitriptyline 25 milliGRAM(s) Oral at bedtime  Biotene Dry Mouth Oral Rinse 5 milliLiter(s) Swish and Spit five times a day  chlorhexidine 4% Liquid 1 Application(s) Topical <User Schedule>  folic acid 1 milliGRAM(s) Oral daily  levETIRAcetam 500 milliGRAM(s) Oral two times a day  methylPREDNISolone sodium succinate Injectable 80 milliGRAM(s) IV Push every 12 hours  multivitamin 1 Tablet(s) Oral daily    PRN:   HYDROmorphone  Injectable 2 milliGRAM(s) IV Push every 4 hours PRN  ondansetron    Tablet 8 milliGRAM(s) Oral three times a day PRN  ondansetron Injectable 8 milliGRAM(s) IV Push every 6 hours PRN  sodium chloride 0.9% lock flush 10 milliLiter(s) IV Push every 1 hour PRN      A/P:   59 year old F with a history of relapsed ALL  Status Post Allogeneic PBSCT 2019, 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 Amylase and lipase WNL, continue clears   s/p Calorie count x 72 hours (4/3-), reglan IVP pre-meals  Discontinued Cefepime and MMF on 4/3, continue on Tacro.    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   Abdominal x-ray for worsening pain unremarkable.  GI consulted.  Repeat amylase/lipase WNL.  Started  carafate/reglan/mylicon/IV Protonix for presumed gastritis.  repeat CT with oral and IV contrast c/w ascitics, likely related to virus vs gvhd, started solumedrol 100 mg IV BID   PICC placed  Decrease solumedrol 90mg BID 4/15. Plan to change to oral prednisone     1. Infectious Disease:   atovaquone Suspension 750 milliGRAM(s) Oral two times a day  valGANciclovir 450 milliGRAM(s) Oral two times a day  vancomycin    Solution 125 milliGRAM(s) Oral every 6 hours    2. VOD Prophylaxis: Actigall, Glutamine    3. GI Prophylaxis:   metoclopramide 10 milliGRAM(s) Oral every 6 hours  pantoprazole    Tablet 40 milliGRAM(s) Oral every 12 hours  simethicone 80 milliGRAM(s) Chew every 6 hours  sucralfate suspension 1 Gram(s) Oral every 6 hours  ursodiol Capsule 300 milliGRAM(s) Oral two times a day    4. Mouthcare - NS / NaHCO3 rinses, Mycelex, Biotene, skin care     5. GVHD prophylaxis   Continue Tacro 1mg bid    6. Transfuse & replete electrolytes prn    uric acid 7.2, monitor closely     7. IV hydration, daily weights, strict I&O, prn diuresis     8. PO intake as tolerated, nutrition follow up as needed, MVI, folic acid   Advanced to soft diet as tolerated + Ensure Enlive BID     9. Antiemetics, anti-diarrhea medications:   Reglan, Ativan    10. OOB as tolerated, physical therapy consult if needed     11. Monitor coags / fibrinogen 2x week, vitamin K as needed     12. Monitor closely for clinical changes, monitor for fevers     13. Emotional support provided, plan of care discussed and questions addressed     14. Patient education done regarding chemotherapy prep, plan of care, restrictions and discharge planning     15. Continue regular social work input     I have written the above note for Dr. Rene who performed service with me in the room.   Shelly Clemente NP-C ([phone])    I have seen and examined patient with NP, I agree with above note as scribed. [facility] Transplant Team                                                      Critical / Counseling Time Provided: 30 minutes                                                                                                                                                        Chief Complaint: indigestion, pain, in epigastric area.     S: Patient seen and examined with [facility] Transplant Team:   + fatigue, generalized weakness   appetite and energy better, walking around unit multiple times per day     Denies mouth / tongue / throat pain, dyspnea, cough, nausea, vomiting, diarrhea, abdominal pain     O: Vitals:   Vital Signs Last 24 Hrs  T(C): 36.4 (2019 05:38), Max: 37.2 (2019 17:46)  T(F): 97.5 (2019 05:38), Max: 98.9 (2019 17:46)  HR: 77 (2019 05:38) (77 - 98)  BP: 147/86 (2019 05:38) (125/85 - 161/93)  BP(mean): --  RR: 18 (2019 05:38) (17 - 18)  SpO2: 100% (2019 05:38) (98% - 100%)    Admit weight: 104.3 kg   Daily Weight in k.9 (2019 08:34)  Today's weight: 109.5 kg     Intake / Output:   -16 @ 07:01  -  04-17 @ 07:00  --------------------------------------------------------  IN: 1798 mL / OUT: 825 mL / NET: 973 mL    PE:   Oropharynx: no erythema or ulcerations  Oral Mucositis:   -                                                    Grade: n/a  CVS: RRR, S1, S2 RRR  Lungs: CTA throughout bilaterally   Abdomen: + BS, soft, non distended,    Gastric Mucositis:   -                                               Grade: n/a  Intestinal Mucositis:   -                                           Grade: n/a   Extremities: +1 le edema  Skin: No rash  Line: R PICC C/D/I  Neuro: alert and oriented x 3     Labs:   CBC Full  -  ( 2019 06:48 )  WBC Count : 5.6 K/uL  Hemoglobin : 9.3 g/dL  Hematocrit : 28.5 %  Platelet Count - Automated : 79 K/uL  Mean Cell Volume : 104.0 fl  Mean Cell Hemoglobin : 33.9 pg  Mean Cell Hemoglobin Concentration : 32.5 gm/dL  Auto Neutrophil # : x  Auto Lymphocyte # : x  Auto Monocyte # : x  Auto Eosinophil # : x  Auto Basophil # : x  Auto Neutrophil % : x  Auto Lymphocyte % : x  Auto Monocyte % : x  Auto Eosinophil % : x  Auto Basophil % : x                          9.3    5.6   )-----------( 79       ( 2019 06:48 )             28.5         142  |  105  |  20  ----------------------------<  71  4.4   |  26  |  0.83    Ca    9.2      2019 06:47  Phos  2.6       Mg     1.7         TPro  4.5<L>  /  Alb  2.7<L>  /  TBili  0.2  /  DBili  x   /  AST  75<H>  /  ALT  90<H>  /  AlkPhos  181<H>        LIVER FUNCTIONS - ( 2019 06:47 )  Alb: 2.7 g/dL / Pro: 4.5 g/dL / ALK PHOS: 181 U/L / ALT: 90 U/L / AST: 75 U/L / GGT: x           Lactate Dehydrogenase, Serum: 282 U/L ( @ 06:47)    Karnofsky / Lansky Scale: 50   GVHD:   Skin: 0  Liver: 0  Gut: upper GI stage 0 (intermittent nausea/anorexia)   Overall ndGndrndanddndend:nd nd2nd Cultures:   Culture - Blood (19 @ 21:18)    Specimen Source: .Blood Blood-Venous    Culture Results:   No growth at 5 days.    Meds:   Antimicrobials:   atovaquone Suspension 750 milliGRAM(s) Oral two times a day  valGANciclovir 450 milliGRAM(s) Oral two times a day  vancomycin    Solution 125 milliGRAM(s) Oral every 6 hours    GI:  metoclopramide 10 milliGRAM(s) Oral every 6 hours  pantoprazole    Tablet 40 milliGRAM(s) Oral every 12 hours  simethicone 80 milliGRAM(s) Chew every 6 hours  sucralfate suspension 1 Gram(s) Oral every 6 hours  ursodiol Capsule 300 milliGRAM(s) Oral two times a day    Immunologic:   tacrolimus 1 milliGRAM(s) Oral <User Schedule>    Other medications:   amitriptyline 25 milliGRAM(s) Oral at bedtime  Biotene Dry Mouth Oral Rinse 5 milliLiter(s) Swish and Spit five times a day  chlorhexidine 4% Liquid 1 Application(s) Topical <User Schedule>  folic acid 1 milliGRAM(s) Oral daily  levETIRAcetam 500 milliGRAM(s) Oral two times a day  methylPREDNISolone sodium succinate Injectable 80 milliGRAM(s) IV Push every 12 hours  multivitamin 1 Tablet(s) Oral daily    PRN:   HYDROmorphone  Injectable 2 milliGRAM(s) IV Push every 4 hours PRN  ondansetron    Tablet 8 milliGRAM(s) Oral three times a day PRN  ondansetron Injectable 8 milliGRAM(s) IV Push every 6 hours PRN  sodium chloride 0.9% lock flush 10 milliLiter(s) IV Push every 1 hour PRN      A/P:   59 year old F with a history of relapsed ALL  Status Post Allogeneic PBSCT 2019, 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 Amylase and lipase WNL, continue clears   s/p Calorie count x 72 hours (4/3-), reglan IVP pre-meals  Discontinued Cefepime and MMF on 4/3, continue on Tacro.    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   Abdominal x-ray for worsening pain unremarkable.  GI consulted.  Repeat amylase/lipase WNL.  Started  carafate/reglan/mylicon/IV Protonix for presumed gastritis.  repeat CT with oral and IV contrast c/w ascitics, likely related to virus vs gvhd, started solumedrol 100 mg IV BID   PICC placed  Decrease solumedrol 90mg BID 4/15. Plan to change to oral prednisone   Tacro level subtherapeutic. Dose increased to 1.5 mg po bid. Repeat level on      1. Infectious Disease:   atovaquone Suspension 750 milliGRAM(s) Oral two times a day  valGANciclovir 450 milliGRAM(s) Oral two times a day  vancomycin    Solution 125 milliGRAM(s) Oral every 6 hours    2. VOD Prophylaxis: Actigall, Glutamine    3. GI Prophylaxis:   metoclopramide 10 milliGRAM(s) Oral every 6 hours  pantoprazole    Tablet 40 milliGRAM(s) Oral every 12 hours  simethicone 80 milliGRAM(s) Chew every 6 hours  sucralfate suspension 1 Gram(s) Oral every 6 hours  ursodiol Capsule 300 milliGRAM(s) Oral two times a day    4. Mouthcare - NS / NaHCO3 rinses, Mycelex, Biotene, skin care     5. GVHD prophylaxis   Continue Tacro 1.5 mg bid    6. Transfuse & replete electrolytes prn     7. IV hydration, daily weights, strict I&O, prn diuresis     8. PO intake as tolerated, nutrition follow up as needed, MVI, folic acid   Advanced to soft diet as tolerated + Ensure Enlive BID     9. Antiemetics, anti-diarrhea medications:   Reglan, Ativan    10. OOB as tolerated, physical therapy consult if needed     11. Monitor coags / fibrinogen 2x week, vitamin K as needed     12. Monitor closely for clinical changes, monitor for fevers     13. Emotional support provided, plan of care discussed and questions addressed     14. Patient education done regarding chemotherapy prep, plan of care, restrictions and discharge planning     15. Continue regular social work input     I have written the above note for Dr. Rene who performed service with me in the room.   Shelly Clemente NP-C ([phone])    I have seen and examined patient with NP, I agree with above note as scribed. [facility] Transplant Team                                                      Critical / Counseling Time Provided: 30 minutes                                                                                                                                                        Chief Complaint: indigestion, pain, in epigastric area.     S: Patient seen and examined with [facility] Transplant Team:   + fatigue, generalized weakness   appetite and energy better, walking around unit multiple times per day     Denies mouth / tongue / throat pain, dyspnea, cough, nausea, vomiting, diarrhea, abdominal pain     O: Vitals:   Vital Signs Last 24 Hrs  T(C): 36.4 (2019 05:38), Max: 37.2 (2019 17:46)  T(F): 97.5 (2019 05:38), Max: 98.9 (2019 17:46)  HR: 77 (2019 05:38) (77 - 98)  BP: 147/86 (2019 05:38) (125/85 - 161/93)  BP(mean): --  RR: 18 (2019 05:38) (17 - 18)  SpO2: 100% (2019 05:38) (98% - 100%)    Admit weight: 104.3 kg   Daily Weight in k.9 (2019 08:34)  Today's weight: 109.5 kg     Intake / Output:   -16 @ 07:01  -  04-17 @ 07:00  --------------------------------------------------------  IN: 1798 mL / OUT: 825 mL / NET: 973 mL    PE:   Oropharynx: no erythema or ulcerations  Oral Mucositis:   -                                                    Grade: n/a  CVS: S1, S2 RRR  Lungs: CTA throughout bilaterally   Abdomen: + BS, soft, non distended,    Gastric Mucositis:   -                                               Grade: n/a  Intestinal Mucositis:   -                                           Grade: n/a   Extremities: +1 le edema  Skin: No rash  Line: R PICC C/D/I  Neuro: alert and oriented x 3     Labs:   CBC Full  -  ( 2019 06:48 )  WBC Count : 5.6 K/uL  Hemoglobin : 9.3 g/dL  Hematocrit : 28.5 %  Platelet Count - Automated : 79 K/uL  Mean Cell Volume : 104.0 fl  Mean Cell Hemoglobin : 33.9 pg  Mean Cell Hemoglobin Concentration : 32.5 gm/dL  Auto Neutrophil # : x  Auto Lymphocyte # : x  Auto Monocyte # : x  Auto Eosinophil # : x  Auto Basophil # : x  Auto Neutrophil % : x  Auto Lymphocyte % : x  Auto Monocyte % : x  Auto Eosinophil % : x  Auto Basophil % : x                          9.3    5.6   )-----------( 79       ( 2019 06:48 )             28.5         142  |  105  |  20  ----------------------------<  71  4.4   |  26  |  0.83    Ca    9.2      2019 06:47  Phos  2.6       Mg     1.7         TPro  4.5<L>  /  Alb  2.7<L>  /  TBili  0.2  /  DBili  x   /  AST  75<H>  /  ALT  90<H>  /  AlkPhos  181<H>        LIVER FUNCTIONS - ( 2019 06:47 )  Alb: 2.7 g/dL / Pro: 4.5 g/dL / ALK PHOS: 181 U/L / ALT: 90 U/L / AST: 75 U/L / GGT: x           Lactate Dehydrogenase, Serum: 282 U/L ( @ 06:47)    Karnofsky / Lansky Scale: 50   GVHD:   Skin: 0  Liver: 0  Gut: upper GI stage 0 (intermittent nausea/anorexia)   Overall ndGndrndanddndend:nd nd2nd Cultures:   Culture - Blood (19 @ 21:18)    Specimen Source: .Blood Blood-Venous    Culture Results:   No growth at 5 days.    Meds:   Antimicrobials:   atovaquone Suspension 750 milliGRAM(s) Oral two times a day  valGANciclovir 450 milliGRAM(s) Oral two times a day  vancomycin    Solution 125 milliGRAM(s) Oral every 6 hours    GI:  metoclopramide 10 milliGRAM(s) Oral every 6 hours  pantoprazole    Tablet 40 milliGRAM(s) Oral every 12 hours  simethicone 80 milliGRAM(s) Chew every 6 hours  sucralfate suspension 1 Gram(s) Oral every 6 hours  ursodiol Capsule 300 milliGRAM(s) Oral two times a day    Immunologic:   tacrolimus 1 milliGRAM(s) Oral <User Schedule>    Other medications:   amitriptyline 25 milliGRAM(s) Oral at bedtime  Biotene Dry Mouth Oral Rinse 5 milliLiter(s) Swish and Spit five times a day  chlorhexidine 4% Liquid 1 Application(s) Topical <User Schedule>  folic acid 1 milliGRAM(s) Oral daily  levETIRAcetam 500 milliGRAM(s) Oral two times a day  methylPREDNISolone sodium succinate Injectable 80 milliGRAM(s) IV Push every 12 hours  multivitamin 1 Tablet(s) Oral daily    PRN:   HYDROmorphone  Injectable 2 milliGRAM(s) IV Push every 4 hours PRN  ondansetron    Tablet 8 milliGRAM(s) Oral three times a day PRN  ondansetron Injectable 8 milliGRAM(s) IV Push every 6 hours PRN  sodium chloride 0.9% lock flush 10 milliLiter(s) IV Push every 1 hour PRN      A/P:   59 year old F with a history of relapsed ALL  Status Post Allogeneic PBSCT 2019, 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 Amylase and lipase WNL, continue clears   s/p Calorie count x 72 hours (4/3-), reglan IVP pre-meals  Discontinued Cefepime and MMF on 4/3, continue on Tacro.    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   Abdominal x-ray for worsening pain unremarkable.  GI consulted.  Repeat amylase/lipase WNL.  Started  carafate/reglan/mylicon/IV Protonix for presumed gastritis.  repeat CT with oral and IV contrast c/w ascitics, likely related to virus vs gvhd, started solumedrol 100 mg IV BID   PICC placed  Decrease solumedrol 90mg BID 4/15. Plan to change to oral prednisone   Tacro level subtherapeutic. Dose increased to 1.5 mg po bid. Repeat level on      1. Infectious Disease:   atovaquone Suspension 750 milliGRAM(s) Oral two times a day  valGANciclovir 450 milliGRAM(s) Oral two times a day  vancomycin    Solution 125 milliGRAM(s) Oral every 6 hours    2. VOD Prophylaxis: Actigall, Glutamine    3. GI Prophylaxis:   metoclopramide 10 milliGRAM(s) Oral every 6 hours  pantoprazole    Tablet 40 milliGRAM(s) Oral every 12 hours  simethicone 80 milliGRAM(s) Chew every 6 hours  sucralfate suspension 1 Gram(s) Oral every 6 hours  ursodiol Capsule 300 milliGRAM(s) Oral two times a day    4. Mouthcare - NS / NaHCO3 rinses, Mycelex, Biotene, skin care     5. GVHD prophylaxis   Continue Tacro 1.5 mg bid    6. Transfuse & replete electrolytes prn     7. IV hydration, daily weights, strict I&O, prn diuresis     8. PO intake as tolerated, nutrition follow up as needed, MVI, folic acid   Advanced to soft diet as tolerated + Ensure Enlive BID     9. Antiemetics, anti-diarrhea medications:   Reglan, Ativan    10. OOB as tolerated, physical therapy consult if needed     11. Monitor coags / fibrinogen 2x week, vitamin K as needed     12. Monitor closely for clinical changes, monitor for fevers     13. Emotional support provided, plan of care discussed and questions addressed     14. Patient education done regarding chemotherapy prep, plan of care, restrictions and discharge planning     15. Continue regular social work input     I have written the above note for Dr. Rene who performed service with me in the room.   Shelly Clemente NP-C ([phone])    I have seen and examined patient with NP, I agree with above note as scribed.

## 2019-04-18 ENCOUNTER — TRANSCRIPTION ENCOUNTER (OUTPATIENT)
Age: 59
End: 2019-04-18

## 2019-04-18 LAB
ALBUMIN SERPL ELPH-MCNC: 3 G/DL — LOW (ref 3.3–5)
ALP SERPL-CCNC: 179 U/L — HIGH (ref 40–120)
ALT FLD-CCNC: 111 U/L — HIGH (ref 10–45)
ANION GAP SERPL CALC-SCNC: 10 MMOL/L — SIGNIFICANT CHANGE UP (ref 5–17)
APTT BLD: 25.1 SEC — LOW (ref 27.5–36.3)
AST SERPL-CCNC: 77 U/L — HIGH (ref 10–40)
BASOPHILS # BLD AUTO: 0 K/UL — SIGNIFICANT CHANGE UP (ref 0–0.2)
BASOPHILS NFR BLD AUTO: 0.1 % — SIGNIFICANT CHANGE UP (ref 0–2)
BILIRUB SERPL-MCNC: 0.2 MG/DL — SIGNIFICANT CHANGE UP (ref 0.2–1.2)
BUN SERPL-MCNC: 18 MG/DL — SIGNIFICANT CHANGE UP (ref 7–23)
CALCIUM SERPL-MCNC: 9.7 MG/DL — SIGNIFICANT CHANGE UP (ref 8.4–10.5)
CHLORIDE SERPL-SCNC: 104 MMOL/L — SIGNIFICANT CHANGE UP (ref 96–108)
CMV DNA CSF QL NAA+PROBE: 448 — SIGNIFICANT CHANGE UP
CMV DNA SPEC NAA+PROBE-LOG#: 2.65 LOGIU/ML — HIGH
CO2 SERPL-SCNC: 27 MMOL/L — SIGNIFICANT CHANGE UP (ref 22–31)
CREAT SERPL-MCNC: 0.84 MG/DL — SIGNIFICANT CHANGE UP (ref 0.5–1.3)
EOSINOPHIL # BLD AUTO: 0 K/UL — SIGNIFICANT CHANGE UP (ref 0–0.5)
EOSINOPHIL NFR BLD AUTO: 0.3 % — SIGNIFICANT CHANGE UP (ref 0–6)
GLUCOSE SERPL-MCNC: 120 MG/DL — HIGH (ref 70–99)
HCT VFR BLD CALC: 29 % — LOW (ref 34.5–45)
HGB BLD-MCNC: 9.4 G/DL — LOW (ref 11.5–15.5)
INR BLD: 0.89 RATIO — SIGNIFICANT CHANGE UP (ref 0.88–1.16)
LDH SERPL L TO P-CCNC: 276 U/L — HIGH (ref 50–242)
LYMPHOCYTES # BLD AUTO: 1.1 K/UL — SIGNIFICANT CHANGE UP (ref 1–3.3)
LYMPHOCYTES # BLD AUTO: 16.4 % — SIGNIFICANT CHANGE UP (ref 13–44)
MAGNESIUM SERPL-MCNC: 1.8 MG/DL — SIGNIFICANT CHANGE UP (ref 1.6–2.6)
MCHC RBC-ENTMCNC: 32.6 GM/DL — SIGNIFICANT CHANGE UP (ref 32–36)
MCHC RBC-ENTMCNC: 34.6 PG — HIGH (ref 27–34)
MCV RBC AUTO: 106 FL — HIGH (ref 80–100)
MONOCYTES # BLD AUTO: 0.2 K/UL — SIGNIFICANT CHANGE UP (ref 0–0.9)
MONOCYTES NFR BLD AUTO: 3.2 % — SIGNIFICANT CHANGE UP (ref 2–14)
NEUTROPHILS # BLD AUTO: 5.3 K/UL — SIGNIFICANT CHANGE UP (ref 1.8–7.4)
NEUTROPHILS NFR BLD AUTO: 80.1 % — HIGH (ref 43–77)
PHOSPHATE SERPL-MCNC: 2.3 MG/DL — LOW (ref 2.5–4.5)
PLATELET # BLD AUTO: 86 K/UL — LOW (ref 150–400)
POTASSIUM SERPL-MCNC: 4.3 MMOL/L — SIGNIFICANT CHANGE UP (ref 3.5–5.3)
POTASSIUM SERPL-SCNC: 4.3 MMOL/L — SIGNIFICANT CHANGE UP (ref 3.5–5.3)
PROT SERPL-MCNC: 4.5 G/DL — LOW (ref 6–8.3)
PROTHROM AB SERPL-ACNC: 10.1 SEC — SIGNIFICANT CHANGE UP (ref 10–12.9)
RBC # BLD: 2.73 M/UL — LOW (ref 3.8–5.2)
RBC # FLD: 19.1 % — HIGH (ref 10.3–14.5)
SODIUM SERPL-SCNC: 141 MMOL/L — SIGNIFICANT CHANGE UP (ref 135–145)
URATE SERPL-MCNC: 5.1 MG/DL — SIGNIFICANT CHANGE UP (ref 2.5–7)
WBC # BLD: 6.6 K/UL — SIGNIFICANT CHANGE UP (ref 3.8–10.5)
WBC # FLD AUTO: 6.6 K/UL — SIGNIFICANT CHANGE UP (ref 3.8–10.5)

## 2019-04-18 PROCEDURE — 99291 CRITICAL CARE FIRST HOUR: CPT

## 2019-04-18 PROCEDURE — 74177 CT ABD & PELVIS W/CONTRAST: CPT | Mod: 26

## 2019-04-18 RX ORDER — HYDROMORPHONE HYDROCHLORIDE 2 MG/ML
4 INJECTION INTRAMUSCULAR; INTRAVENOUS; SUBCUTANEOUS EVERY 4 HOURS
Qty: 0 | Refills: 0 | Status: DISCONTINUED | OUTPATIENT
Start: 2019-04-18 | End: 2019-04-19

## 2019-04-18 RX ORDER — POTASSIUM PHOSPHATE, MONOBASIC POTASSIUM PHOSPHATE, DIBASIC 236; 224 MG/ML; MG/ML
15 INJECTION, SOLUTION INTRAVENOUS ONCE
Qty: 0 | Refills: 0 | Status: COMPLETED | OUTPATIENT
Start: 2019-04-18 | End: 2019-04-18

## 2019-04-18 RX ADMIN — HYDROMORPHONE HYDROCHLORIDE 4 MILLIGRAM(S): 2 INJECTION INTRAMUSCULAR; INTRAVENOUS; SUBCUTANEOUS at 23:18

## 2019-04-18 RX ADMIN — Medication 5 MILLILITER(S): at 08:46

## 2019-04-18 RX ADMIN — PANTOPRAZOLE SODIUM 40 MILLIGRAM(S): 20 TABLET, DELAYED RELEASE ORAL at 05:09

## 2019-04-18 RX ADMIN — HYDROMORPHONE HYDROCHLORIDE 2 MILLIGRAM(S): 2 INJECTION INTRAMUSCULAR; INTRAVENOUS; SUBCUTANEOUS at 08:46

## 2019-04-18 RX ADMIN — POTASSIUM PHOSPHATE, MONOBASIC POTASSIUM PHOSPHATE, DIBASIC 62.5 MILLIMOLE(S): 236; 224 INJECTION, SOLUTION INTRAVENOUS at 08:46

## 2019-04-18 RX ADMIN — URSODIOL 300 MILLIGRAM(S): 250 TABLET, FILM COATED ORAL at 17:34

## 2019-04-18 RX ADMIN — ATOVAQUONE 750 MILLIGRAM(S): 750 SUSPENSION ORAL at 05:09

## 2019-04-18 RX ADMIN — VALGANCICLOVIR 450 MILLIGRAM(S): 450 TABLET, FILM COATED ORAL at 10:09

## 2019-04-18 RX ADMIN — Medication 1 GRAM(S): at 05:09

## 2019-04-18 RX ADMIN — LEVETIRACETAM 500 MILLIGRAM(S): 250 TABLET, FILM COATED ORAL at 17:34

## 2019-04-18 RX ADMIN — SIMETHICONE 80 MILLIGRAM(S): 80 TABLET, CHEWABLE ORAL at 05:09

## 2019-04-18 RX ADMIN — Medication 1 GRAM(S): at 23:21

## 2019-04-18 RX ADMIN — URSODIOL 300 MILLIGRAM(S): 250 TABLET, FILM COATED ORAL at 05:09

## 2019-04-18 RX ADMIN — Medication 5 MILLILITER(S): at 14:44

## 2019-04-18 RX ADMIN — PANTOPRAZOLE SODIUM 40 MILLIGRAM(S): 20 TABLET, DELAYED RELEASE ORAL at 17:34

## 2019-04-18 RX ADMIN — LEVETIRACETAM 500 MILLIGRAM(S): 250 TABLET, FILM COATED ORAL at 05:09

## 2019-04-18 RX ADMIN — HYDROMORPHONE HYDROCHLORIDE 2 MILLIGRAM(S): 2 INJECTION INTRAMUSCULAR; INTRAVENOUS; SUBCUTANEOUS at 05:04

## 2019-04-18 RX ADMIN — HYDROMORPHONE HYDROCHLORIDE 2 MILLIGRAM(S): 2 INJECTION INTRAMUSCULAR; INTRAVENOUS; SUBCUTANEOUS at 04:34

## 2019-04-18 RX ADMIN — Medication 1 GRAM(S): at 11:44

## 2019-04-18 RX ADMIN — HYDROMORPHONE HYDROCHLORIDE 4 MILLIGRAM(S): 2 INJECTION INTRAMUSCULAR; INTRAVENOUS; SUBCUTANEOUS at 17:55

## 2019-04-18 RX ADMIN — HYDROMORPHONE HYDROCHLORIDE 4 MILLIGRAM(S): 2 INJECTION INTRAMUSCULAR; INTRAVENOUS; SUBCUTANEOUS at 13:18

## 2019-04-18 RX ADMIN — Medication 10 MILLIGRAM(S): at 17:34

## 2019-04-18 RX ADMIN — HYDROMORPHONE HYDROCHLORIDE 4 MILLIGRAM(S): 2 INJECTION INTRAMUSCULAR; INTRAVENOUS; SUBCUTANEOUS at 22:01

## 2019-04-18 RX ADMIN — HYDROMORPHONE HYDROCHLORIDE 4 MILLIGRAM(S): 2 INJECTION INTRAMUSCULAR; INTRAVENOUS; SUBCUTANEOUS at 13:33

## 2019-04-18 RX ADMIN — Medication 5 MILLILITER(S): at 23:21

## 2019-04-18 RX ADMIN — ATOVAQUONE 750 MILLIGRAM(S): 750 SUSPENSION ORAL at 17:33

## 2019-04-18 RX ADMIN — Medication 10 MILLIGRAM(S): at 11:44

## 2019-04-18 RX ADMIN — Medication 125 MILLIGRAM(S): at 05:09

## 2019-04-18 RX ADMIN — Medication 1 MILLIGRAM(S): at 11:44

## 2019-04-18 RX ADMIN — TACROLIMUS 1.5 MILLIGRAM(S): 5 CAPSULE ORAL at 10:09

## 2019-04-18 RX ADMIN — HYDROMORPHONE HYDROCHLORIDE 4 MILLIGRAM(S): 2 INJECTION INTRAMUSCULAR; INTRAVENOUS; SUBCUTANEOUS at 17:37

## 2019-04-18 RX ADMIN — Medication 1 TABLET(S): at 11:44

## 2019-04-18 RX ADMIN — Medication 100 MILLIGRAM(S): at 05:09

## 2019-04-18 RX ADMIN — Medication 125 MILLIGRAM(S): at 17:34

## 2019-04-18 RX ADMIN — SIMETHICONE 80 MILLIGRAM(S): 80 TABLET, CHEWABLE ORAL at 23:21

## 2019-04-18 RX ADMIN — Medication 10 MILLIGRAM(S): at 23:21

## 2019-04-18 RX ADMIN — Medication 1 GRAM(S): at 17:34

## 2019-04-18 RX ADMIN — SIMETHICONE 80 MILLIGRAM(S): 80 TABLET, CHEWABLE ORAL at 17:34

## 2019-04-18 RX ADMIN — TACROLIMUS 1.5 MILLIGRAM(S): 5 CAPSULE ORAL at 21:08

## 2019-04-18 RX ADMIN — Medication 10 MILLIGRAM(S): at 05:09

## 2019-04-18 RX ADMIN — VALGANCICLOVIR 450 MILLIGRAM(S): 450 TABLET, FILM COATED ORAL at 21:07

## 2019-04-18 RX ADMIN — Medication 5 MILLILITER(S): at 21:06

## 2019-04-18 RX ADMIN — CHLORHEXIDINE GLUCONATE 1 APPLICATION(S): 213 SOLUTION TOPICAL at 05:09

## 2019-04-18 RX ADMIN — Medication 125 MILLIGRAM(S): at 23:21

## 2019-04-18 RX ADMIN — Medication 25 MILLIGRAM(S): at 21:07

## 2019-04-18 RX ADMIN — Medication 5 MILLILITER(S): at 11:44

## 2019-04-18 RX ADMIN — HYDROMORPHONE HYDROCHLORIDE 2 MILLIGRAM(S): 2 INJECTION INTRAMUSCULAR; INTRAVENOUS; SUBCUTANEOUS at 09:01

## 2019-04-18 RX ADMIN — Medication 125 MILLIGRAM(S): at 11:44

## 2019-04-18 RX ADMIN — SIMETHICONE 80 MILLIGRAM(S): 80 TABLET, CHEWABLE ORAL at 11:44

## 2019-04-18 NOTE — DISCHARGE NOTE NURSING/CASE MANAGEMENT/SOCIAL WORK - NSDCDPATPORTLINK_GEN_ALL_CORE
You can access the [facility] Portal, offered by [facility], by registering with the following website: [email or web address]

## 2019-04-18 NOTE — PROGRESS NOTE ADULT - ATTENDING COMMENTS
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  cmv positive ct with vague findings in pancreas amylase and lipase nl unclear etiology of upper GI discomfort was improving slowly. Patient complaints of abdominal pain localized to the epigastric area and left upper quadrant. 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     ID: valcyte 450 bid, cmv pcr stable since 4/5.  PCR 4/8/19 02132. 4/15/19 1211.  cont valcyte 450 bid  non-neutropenic on px abx  on empiric po vanco  125 mg q 6hrs for c diff being tapered slowly   Tacro 3.4 (4/17/19) increased to 1.5 mg BID, recheck Friday.    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 Plan to switch to oral steroids tomorrow.   Continue  full diet, tolerated well.   Most recent FISH for Y on  100% donor.    Continue Actigall for VOD prevention  Continue Keppra for h/o seizures  Continue Mepron for PCP ppx  Continue supportive care.   On dilaudid 2 mg q4hr   Continue carafate, protonix.  Patient is ambulating with by herself, tolerating meals, denies N/V or diarrhea. Tentatively scheduled for discharge on 4/19/19. 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  cmv positive ct with vague findings in pancreas amylase and lipase nl unclear etiology of upper GI discomfort was improving slowly. Patient complaints of abdominal pain localized to the epigastric area and right upper quadrant with palpation. 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 CT scan of the abdomen with IV and PO contrast ordered. Patient is using Dilaudid  IV total of 12 mg /24 hrs. Patient is ambulating by herself, tolerating meals, denies N/V or diarrhea.     ID: valcyte 450 bid, cmv pcr stable since 4/5.  PCR 4/8/19 25494. 4/15/19 1211.  cont valcyte 450 bid  non-neutropenic on px abx  on empiric po vanco  125 mg q 6hrs for c diff being tapered slowly     GVHD: MMF 1G PO BID DC on 4/4. 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  Tacro 3.4 (4/17/19) increased to 1.5 mg BID, recheck Friday.    Continue  full diet, tolerated well.   Most recent FISH for Y on  100% donor.    Continue Actigall for VOD prevention  Continue Keppra for h/o seizures  Continue Mepron for PCP ppx  Continue supportive care.     Continue carafate, protonix.

## 2019-04-18 NOTE — DISCHARGE NOTE NURSING/CASE MANAGEMENT/SOCIAL WORK - NSSCNAMETXT_GEN_ALL_CORE
[facility] and [facility]

## 2019-04-18 NOTE — PROGRESS NOTE ADULT - SUBJECTIVE AND OBJECTIVE BOX
[facility] Transplant Team                                                      Critical / Counseling Time Provided: 30 minutes                                                                                                                                                        Chief Complaint: indigestion, pain, in epigastric area     S: Patient seen and examined with [facility] Transplant Team:   + fatigue, generalized weakness   appetite and energy better, walking around unit multiple times per day     Denies mouth / tongue / throat pain, dyspnea, cough, nausea, vomiting, diarrhea, abdominal pain     O: Vitals:   Vital Signs Last 24 Hrs  T(C): 36.2 (2019 05:23), Max: 37 (2019 21:49)  T(F): 97.1 (2019 05:23), Max: 98.6 (2019 21:49)  HR: 81 (2019 05:23) (81 - 99)  BP: 149/89 (2019 05:23) (116/62 - 149/91)  BP(mean): --  RR: 18 (2019 05:23) (18 - 18)  SpO2: 98% (2019 05:23) (96% - 100%)    Admit weight: 104.3 kg   Daily Weight in k.5 (2019 08:25)  Today's weight:     Intake / Output:   -17 @ 07:01  -  -18 @ 07:00  --------------------------------------------------------  IN: 1290 mL / OUT: 0 mL / NET: 1290 mL    PE:   Oropharynx: no erythema or ulcerations  Oral Mucositis:   -                                                    Grade: n/a  CVS: S1, S2 RRR  Lungs: CTA throughout bilaterally   Abdomen: + BS, soft, non distended,    Gastric Mucositis:   -                                               Grade: n/a  Intestinal Mucositis:   -                                           Grade: n/a   Extremities: +1 le edema  Skin: No rash  Line: R PICC C/D/I  Neuro: alert and oriented x 3     Labs:   CBC Full  -  ( 2019 06:39 )  WBC Count : 6.6 K/uL  Hemoglobin : 9.4 g/dL  Hematocrit : 29.0 %  Platelet Count - Automated : 86 K/uL  Mean Cell Volume : 106.0 fl  Mean Cell Hemoglobin : 34.6 pg  Mean Cell Hemoglobin Concentration : 32.6 gm/dL  Auto Neutrophil # : 5.3 K/uL  Auto Lymphocyte # : 1.1 K/uL  Auto Monocyte # : 0.2 K/uL  Auto Eosinophil # : 0.0 K/uL  Auto Basophil # : 0.0 K/uL  Auto Neutrophil % : 80.1 %  Auto Lymphocyte % : 16.4 %  Auto Monocyte % : 3.2 %  Auto Eosinophil % : 0.3 %  Auto Basophil % : 0.1 %                          9.4    6.6   )-----------( 86       ( 2019 06:39 )             29.0         141  |  104  |  18  ----------------------------<  120<H>  4.3   |  27  |  0.84    Ca    9.7      2019 06:37  Phos  2.3       Mg     1.8         TPro  4.5<L>  /  Alb  3.0<L>  /  TBili  0.2  /  DBili  x   /  AST  77<H>  /  ALT  111<H>  /  AlkPhos  179<H>      PT/INR - ( 2019 06:38 )   PT: 10.1 sec;   INR: 0.89 ratio      LIVER FUNCTIONS - ( 2019 06:37 )  Alb: 3.0 g/dL / Pro: 4.5 g/dL / ALK PHOS: 179 U/L / ALT: 111 U/L / AST: 77 U/L / GGT: x           Lactate Dehydrogenase, Serum: 276 U/L ( @ 06:37)      @ 07:54  Tacrolimus 3.4                Cyclosporine --    Cultures:   Culture - Blood (19 @ 21:18)    Specimen Source: .Blood Blood-Venous    Culture Results:   No growth at 5 days.    Meds:   Antimicrobials:   atovaquone Suspension 750 milliGRAM(s) Oral two times a day  valGANciclovir 450 milliGRAM(s) Oral two times a day  vancomycin    Solution 125 milliGRAM(s) Oral every 6 hours    GI:  metoclopramide 10 milliGRAM(s) Oral every 6 hours  pantoprazole    Tablet 40 milliGRAM(s) Oral every 12 hours  simethicone 80 milliGRAM(s) Chew every 6 hours  sucralfate suspension 1 Gram(s) Oral every 6 hours  ursodiol Capsule 300 milliGRAM(s) Oral two times a day    Immunologic:   tacrolimus 1.5 milliGRAM(s) Oral <User Schedule>    Other medications:   amitriptyline 25 milliGRAM(s) Oral at bedtime  Biotene Dry Mouth Oral Rinse 5 milliLiter(s) Swish and Spit five times a day  chlorhexidine 4% Liquid 1 Application(s) Topical <User Schedule>  folic acid 1 milliGRAM(s) Oral daily  levETIRAcetam 500 milliGRAM(s) Oral two times a day  multivitamin 1 Tablet(s) Oral daily  potassium phosphate IVPB 15 milliMole(s) IV Intermittent once  predniSONE   Tablet 100 milliGRAM(s) Oral daily    PRN:   HYDROmorphone  Injectable 2 milliGRAM(s) IV Push every 4 hours PRN  ondansetron    Tablet 8 milliGRAM(s) Oral three times a day PRN  ondansetron Injectable 8 milliGRAM(s) IV Push every 8 hours PRN  sodium chloride 0.9% lock flush 10 milliLiter(s) IV Push every 1 hour PRN    A/P:   59 year old F with a history of relapsed ALL  Status Post Allogeneic PBSCT 2019, 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 Amylase and lipase WNL, continue clears   s/p Calorie count x 72 hours (4/3-), reglan IVP pre-meals  Discontinued Cefepime and MMF on 4/3, continue on Tacro.    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   Abdominal x-ray for worsening pain unremarkable.  GI consulted.  Repeat amylase/lipase WNL.  Started  carafate/reglan/mylicon/IV Protonix for presumed gastritis.  repeat CT with oral and IV contrast c/w ascitics, likely related to virus vs gvhd, started solumedrol 100 mg IV BID   PICC placed  Decrease solumedrol 90mg BID 4/15. Plan to change to oral prednisone   Tacro level subtherapeutic. Dose increased to 1.5 mg po bid on . Repeat level on      1. Infectious Disease:   atovaquone Suspension 750 milliGRAM(s) Oral two times a day  valGANciclovir 450 milliGRAM(s) Oral two times a day  vancomycin    Solution 125 milliGRAM(s) Oral every 6 hours    2. VOD Prophylaxis: Actigall, Glutamine    3. GI Prophylaxis:   metoclopramide 10 milliGRAM(s) Oral every 6 hours  pantoprazole    Tablet 40 milliGRAM(s) Oral every 12 hours  simethicone 80 milliGRAM(s) Chew every 6 hours  sucralfate suspension 1 Gram(s) Oral every 6 hours  ursodiol Capsule 300 milliGRAM(s) Oral two times a day    4. Mouthcare - NS / NaHCO3 rinses, Mycelex, Biotene, skin care     5. GVHD prophylaxis   Continue Tacro 1.5 mg bid    6. Transfuse & replete electrolytes prn     7. IV hydration, daily weights, strict I&O, prn diuresis     8. PO intake as tolerated, nutrition follow up as needed, MVI, folic acid   Advanced to soft diet as tolerated + Ensure Enlive BID     9. Antiemetics, anti-diarrhea medications:   Reglan, Ativan    10. OOB as tolerated, physical therapy consult if needed     11. Monitor coags / fibrinogen 2x week, vitamin K as needed     12. Monitor closely for clinical changes, monitor for fevers     13. Emotional support provided, plan of care discussed and questions addressed     14. Patient education done regarding chemotherapy prep, plan of care, restrictions and discharge planning     15. Continue regular social work input     I have written the above note for Dr. Rene who performed service with me in the room.   Shelly Clemente NP-C ([phone])    I have seen and examined patient with NP, I agree with above note as scribed. [facility] Transplant Team                                                      Critical / Counseling Time Provided: 30 minutes                                                                                                                                                        Chief Complaint: indigestion, pain, in epigastric area     S: Patient seen and examined with [facility] Transplant Team:   + fatigue, generalized weakness   + cough   + RUQ abdominal discomfort   appetite and energy better, walking around unit multiple times per day     Denies mouth / tongue / throat pain, dyspnea, nausea, vomiting, diarrhea, abdominal pain     O: Vitals:   Vital Signs Last 24 Hrs  T(C): 36.2 (2019 05:23), Max: 37 (2019 21:49)  T(F): 97.1 (2019 05:23), Max: 98.6 (2019 21:49)  HR: 81 (2019 05:23) (81 - 99)  BP: 149/89 (2019 05:23) (116/62 - 149/91)  BP(mean): --  RR: 18 (2019 05:23) (18 - 18)  SpO2: 98% (2019 05:23) (96% - 100%)    Admit weight: 104.3 kg   Daily Weight in k.5 (2019 08:25)  Today's weight: 108.8 kg     Intake / Output:    @ 07:01  -  18 @ 07:00  --------------------------------------------------------  IN: 1290 mL / OUT: 0 mL / NET: 1290 mL    PE:   Oropharynx: no erythema or ulcerations  Oral Mucositis:   -                                                    Grade: n/a  CVS: S1, S2 RRR  Lungs: CTA throughout bilaterally   Abdomen: + BS, soft, non distended, RUQ tenderness   Gastric Mucositis:   -                                               Grade: n/a  Intestinal Mucositis:   -                                           Grade: n/a   Extremities: +1 le edema  Skin: No rash  Line: R PICC C/D/I  Neuro: alert and oriented x 3     Labs:   CBC Full  -  ( 2019 06:39 )  WBC Count : 6.6 K/uL  Hemoglobin : 9.4 g/dL  Hematocrit : 29.0 %  Platelet Count - Automated : 86 K/uL  Mean Cell Volume : 106.0 fl  Mean Cell Hemoglobin : 34.6 pg  Mean Cell Hemoglobin Concentration : 32.6 gm/dL  Auto Neutrophil # : 5.3 K/uL  Auto Lymphocyte # : 1.1 K/uL  Auto Monocyte # : 0.2 K/uL  Auto Eosinophil # : 0.0 K/uL  Auto Basophil # : 0.0 K/uL  Auto Neutrophil % : 80.1 %  Auto Lymphocyte % : 16.4 %  Auto Monocyte % : 3.2 %  Auto Eosinophil % : 0.3 %  Auto Basophil % : 0.1 %                          9.4    6.6   )-----------( 86       ( 2019 06:39 )             29.0     -18    141  |  104  |  18  ----------------------------<  120<H>  4.3   |  27  |  0.84    Ca    9.7      2019 06:37  Phos  2.3       Mg     1.8         TPro  4.5<L>  /  Alb  3.0<L>  /  TBili  0.2  /  DBili  x   /  AST  77<H>  /  ALT  111<H>  /  AlkPhos  179<H>      PT/INR - ( 2019 06:38 )   PT: 10.1 sec;   INR: 0.89 ratio      LIVER FUNCTIONS - ( 2019 06:37 )  Alb: 3.0 g/dL / Pro: 4.5 g/dL / ALK PHOS: 179 U/L / ALT: 111 U/L / AST: 77 U/L / GGT: x           Lactate Dehydrogenase, Serum: 276 U/L ( @ 06:37)      @ 07:54  Tacrolimus 3.4                Cyclosporine --    Cultures:   Culture - Blood (19 @ 21:18)    Specimen Source: .Blood Blood-Venous    Culture Results:   No growth at 5 days.    Meds:   Antimicrobials:   atovaquone Suspension 750 milliGRAM(s) Oral two times a day  valGANciclovir 450 milliGRAM(s) Oral two times a day  vancomycin    Solution 125 milliGRAM(s) Oral every 6 hours    GI:  metoclopramide 10 milliGRAM(s) Oral every 6 hours  pantoprazole    Tablet 40 milliGRAM(s) Oral every 12 hours  simethicone 80 milliGRAM(s) Chew every 6 hours  sucralfate suspension 1 Gram(s) Oral every 6 hours  ursodiol Capsule 300 milliGRAM(s) Oral two times a day    Immunologic:   tacrolimus 1.5 milliGRAM(s) Oral <User Schedule>    Other medications:   amitriptyline 25 milliGRAM(s) Oral at bedtime  Biotene Dry Mouth Oral Rinse 5 milliLiter(s) Swish and Spit five times a day  chlorhexidine 4% Liquid 1 Application(s) Topical <User Schedule>  folic acid 1 milliGRAM(s) Oral daily  levETIRAcetam 500 milliGRAM(s) Oral two times a day  multivitamin 1 Tablet(s) Oral daily  potassium phosphate IVPB 15 milliMole(s) IV Intermittent once  predniSONE   Tablet 100 milliGRAM(s) Oral daily    PRN:   HYDROmorphone  Injectable 2 milliGRAM(s) IV Push every 4 hours PRN  ondansetron    Tablet 8 milliGRAM(s) Oral three times a day PRN  ondansetron Injectable 8 milliGRAM(s) IV Push every 8 hours PRN  sodium chloride 0.9% lock flush 10 milliLiter(s) IV Push every 1 hour PRN    A/P:   59 year old F with a history of relapsed ALL  Status Post Allogeneic PBSCT 2019, 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 Amylase and lipase WNL, continue clears   s/p Calorie count x 72 hours (4/3-), reglan IVP pre-meals  Discontinued Cefepime and MMF on 4/3, continue on Tacro.    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   Abdominal x-ray for worsening pain unremarkable.  GI consulted.  Repeat amylase/lipase WNL.  Started  carafate/reglan/mylicon/IV Protonix for presumed gastritis.  repeat CT with oral and IV contrast c/w ascitics, likely related to virus vs gvhd, started solumedrol 100 mg IV BID   PICC placed  Decrease solumedrol 90mg BID 4/15. Plan to change to oral prednisone   Tacro level subtherapeutic. Dose increased to 1.5 mg po bid on . Repeat level on    CT abdomen/pelvis with oral and IV contrast to evaluate abdominal pain. Change to oral pain regimen.  Discharge planning     1. Infectious Disease:   atovaquone Suspension 750 milliGRAM(s) Oral two times a day  valGANciclovir 450 milliGRAM(s) Oral two times a day  vancomycin    Solution 125 milliGRAM(s) Oral every 6 hours    2. VOD Prophylaxis: Actigall, Glutamine    3. GI Prophylaxis:   metoclopramide 10 milliGRAM(s) Oral every 6 hours  pantoprazole    Tablet 40 milliGRAM(s) Oral every 12 hours  simethicone 80 milliGRAM(s) Chew every 6 hours  sucralfate suspension 1 Gram(s) Oral every 6 hours  ursodiol Capsule 300 milliGRAM(s) Oral two times a day    4. Mouthcare - NS / NaHCO3 rinses, Mycelex, Biotene, skin care     5. GVHD prophylaxis   Continue Tacro 1.5 mg bid    6. Transfuse & replete electrolytes prn     7. IV hydration, daily weights, strict I&O, prn diuresis     8. PO intake as tolerated, nutrition follow up as needed, MVI, folic acid   Advanced to soft diet as tolerated + Ensure Enlive BID     9. Antiemetics, anti-diarrhea medications:   Reglan, Ativan    10. OOB as tolerated, physical therapy consult if needed     11. Monitor coags / fibrinogen 2x week, vitamin K as needed     12. Monitor closely for clinical changes, monitor for fevers     13. Emotional support provided, plan of care discussed and questions addressed     14. Patient education done regarding chemotherapy prep, plan of care, restrictions and discharge planning     15. Continue regular social work input     I have written the above note for Dr. Rene who performed service with me in the room.   Shelly Clemente NP-C ([phone])    I have seen and examined patient with NP, I agree with above note as scribed. [facility] Transplant Team                                                      Critical / Counseling Time Provided: 30 minutes                                                                                                                                                        Chief Complaint: indigestion, pain, in epigastric area     S: Patient seen and examined with [facility] Transplant Team:   + fatigue, generalized weakness   + RUQ abdominal discomfort   appetite and energy better, walking around unit multiple times per day     Denies mouth / tongue / throat pain, dyspnea, nausea, vomiting, diarrhea    O: Vitals:   Vital Signs Last 24 Hrs  T(C): 36.2 (2019 05:23), Max: 37 (2019 21:49)  T(F): 97.1 (2019 05:23), Max: 98.6 (2019 21:49)  HR: 81 (2019 05:23) (81 - 99)  BP: 149/89 (2019 05:23) (116/62 - 149/91)  BP(mean): --  RR: 18 (2019 05:23) (18 - 18)  SpO2: 98% (2019 05:23) (96% - 100%)    Admit weight: 104.3 kg   Daily Weight in k.5 (2019 08:25)  Today's weight: 108.8 kg     Intake / Output:   -17 @ 07:01  -  -18 @ 07:00  --------------------------------------------------------  IN: 1290 mL / OUT: 0 mL / NET: 1290 mL    PE:   Oropharynx: no erythema or ulcerations  Oral Mucositis:   -                                                    Grade: n/a  CVS: S1, S2 RRR  Lungs: CTA throughout bilaterally   Abdomen: + BS, soft, non distended, RUQ tenderness   Gastric Mucositis:   -                                               Grade: n/a  Intestinal Mucositis:   -                                           Grade: n/a   Extremities: +1 le edema  Skin: No rash  Line: R PICC C/D/I  Neuro: alert and oriented x 3     Labs:   CBC Full  -  ( 2019 06:39 )  WBC Count : 6.6 K/uL  Hemoglobin : 9.4 g/dL  Hematocrit : 29.0 %  Platelet Count - Automated : 86 K/uL  Mean Cell Volume : 106.0 fl  Mean Cell Hemoglobin : 34.6 pg  Mean Cell Hemoglobin Concentration : 32.6 gm/dL  Auto Neutrophil # : 5.3 K/uL  Auto Lymphocyte # : 1.1 K/uL  Auto Monocyte # : 0.2 K/uL  Auto Eosinophil # : 0.0 K/uL  Auto Basophil # : 0.0 K/uL  Auto Neutrophil % : 80.1 %  Auto Lymphocyte % : 16.4 %  Auto Monocyte % : 3.2 %  Auto Eosinophil % : 0.3 %  Auto Basophil % : 0.1 %                          9.4    6.6   )-----------( 86       ( 2019 06:39 )             29.0         141  |  104  |  18  ----------------------------<  120<H>  4.3   |  27  |  0.84    Ca    9.7      2019 06:37  Phos  2.3       Mg     1.8         TPro  4.5<L>  /  Alb  3.0<L>  /  TBili  0.2  /  DBili  x   /  AST  77<H>  /  ALT  111<H>  /  AlkPhos  179<H>      PT/INR - ( 2019 06:38 )   PT: 10.1 sec;   INR: 0.89 ratio      LIVER FUNCTIONS - ( 2019 06:37 )  Alb: 3.0 g/dL / Pro: 4.5 g/dL / ALK PHOS: 179 U/L / ALT: 111 U/L / AST: 77 U/L / GGT: x           Lactate Dehydrogenase, Serum: 276 U/L ( @ 06:37)      @ 07:54  Tacrolimus 3.4                Cyclosporine --    Cultures:   Culture - Blood (19 @ 21:18)    Specimen Source: .Blood Blood-Venous    Culture Results:   No growth at 5 days.    Meds:   Antimicrobials:   atovaquone Suspension 750 milliGRAM(s) Oral two times a day  valGANciclovir 450 milliGRAM(s) Oral two times a day  vancomycin    Solution 125 milliGRAM(s) Oral every 6 hours    GI:  metoclopramide 10 milliGRAM(s) Oral every 6 hours  pantoprazole    Tablet 40 milliGRAM(s) Oral every 12 hours  simethicone 80 milliGRAM(s) Chew every 6 hours  sucralfate suspension 1 Gram(s) Oral every 6 hours  ursodiol Capsule 300 milliGRAM(s) Oral two times a day    Immunologic:   tacrolimus 1.5 milliGRAM(s) Oral <User Schedule>    Other medications:   amitriptyline 25 milliGRAM(s) Oral at bedtime  Biotene Dry Mouth Oral Rinse 5 milliLiter(s) Swish and Spit five times a day  chlorhexidine 4% Liquid 1 Application(s) Topical <User Schedule>  folic acid 1 milliGRAM(s) Oral daily  levETIRAcetam 500 milliGRAM(s) Oral two times a day  multivitamin 1 Tablet(s) Oral daily  potassium phosphate IVPB 15 milliMole(s) IV Intermittent once  predniSONE   Tablet 100 milliGRAM(s) Oral daily    PRN:   HYDROmorphone  Injectable 2 milliGRAM(s) IV Push every 4 hours PRN  ondansetron    Tablet 8 milliGRAM(s) Oral three times a day PRN  ondansetron Injectable 8 milliGRAM(s) IV Push every 8 hours PRN  sodium chloride 0.9% lock flush 10 milliLiter(s) IV Push every 1 hour PRN    A/P:   59 year old F with a history of relapsed ALL  Status Post Allogeneic PBSCT 2019, 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 Amylase and lipase WNL, continue clears   s/p Calorie count x 72 hours (4/3-), reglan IVP pre-meals  Discontinued Cefepime and MMF on 4/3, continue on Tacro.    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   Abdominal x-ray for worsening pain unremarkable.  GI consulted.  Repeat amylase/lipase WNL.  Started  carafate/reglan/mylicon/IV Protonix for presumed gastritis.  repeat CT with oral and IV contrast c/w ascitics, likely related to virus vs gvhd, started solumedrol 100 mg IV BID   PICC placed  Decrease solumedrol 90mg BID 4/15. Plan to change to oral prednisone   Tacro level subtherapeutic. Dose increased to 1.5 mg po bid on . Repeat level on    CT abdomen/pelvis with oral and IV contrast to evaluate abdominal pain. Change to oral pain regimen.  Discharge planning     1. Infectious Disease:   atovaquone Suspension 750 milliGRAM(s) Oral two times a day  valGANciclovir 450 milliGRAM(s) Oral two times a day  vancomycin    Solution 125 milliGRAM(s) Oral every 6 hours    2. VOD Prophylaxis: Actigall, Glutamine    3. GI Prophylaxis:   metoclopramide 10 milliGRAM(s) Oral every 6 hours  pantoprazole    Tablet 40 milliGRAM(s) Oral every 12 hours  simethicone 80 milliGRAM(s) Chew every 6 hours  sucralfate suspension 1 Gram(s) Oral every 6 hours  ursodiol Capsule 300 milliGRAM(s) Oral two times a day    4. Mouthcare - NS / NaHCO3 rinses, Mycelex, Biotene, skin care     5. GVHD prophylaxis   Continue Tacro 1.5 mg bid    6. Transfuse & replete electrolytes prn     7. IV hydration, daily weights, strict I&O, prn diuresis     8. PO intake as tolerated, nutrition follow up as needed, MVI, folic acid   Advanced to soft diet as tolerated + Ensure Enlive BID     9. Antiemetics, anti-diarrhea medications:   Reglan, Ativan    10. OOB as tolerated, physical therapy consult if needed     11. Monitor coags / fibrinogen 2x week, vitamin K as needed     12. Monitor closely for clinical changes, monitor for fevers     13. Emotional support provided, plan of care discussed and questions addressed     14. Patient education done regarding chemotherapy prep, plan of care, restrictions and discharge planning     15. Continue regular social work input     I have written the above note for Dr. Rene who performed service with me in the room.   Shelly Clemente NP-C ([phone])    I have seen and examined patient with NP, I agree with above note as scribed.

## 2019-04-19 ENCOUNTER — APPOINTMENT (OUTPATIENT)
Dept: HEMATOLOGY ONCOLOGY | Facility: CLINIC | Age: 59
End: 2019-04-19

## 2019-04-19 ENCOUNTER — TRANSCRIPTION ENCOUNTER (OUTPATIENT)
Age: 59
End: 2019-04-19

## 2019-04-19 VITALS
HEART RATE: 89 BPM | OXYGEN SATURATION: 97 % | TEMPERATURE: 99 F | DIASTOLIC BLOOD PRESSURE: 90 MMHG | RESPIRATION RATE: 18 BRPM | SYSTOLIC BLOOD PRESSURE: 143 MMHG

## 2019-04-19 LAB
ALBUMIN SERPL ELPH-MCNC: 2.8 G/DL — LOW (ref 3.3–5)
ALP SERPL-CCNC: 161 U/L — HIGH (ref 40–120)
ALT FLD-CCNC: 126 U/L — HIGH (ref 10–45)
ANION GAP SERPL CALC-SCNC: 8 MMOL/L — SIGNIFICANT CHANGE UP (ref 5–17)
AST SERPL-CCNC: 89 U/L — HIGH (ref 10–40)
BASOPHILS # BLD AUTO: 0 K/UL — SIGNIFICANT CHANGE UP (ref 0–0.2)
BASOPHILS NFR BLD AUTO: 0 % — SIGNIFICANT CHANGE UP (ref 0–2)
BILIRUB SERPL-MCNC: 0.2 MG/DL — SIGNIFICANT CHANGE UP (ref 0.2–1.2)
BLD GP AB SCN SERPL QL: NEGATIVE — SIGNIFICANT CHANGE UP
BUN SERPL-MCNC: 16 MG/DL — SIGNIFICANT CHANGE UP (ref 7–23)
CALCIUM SERPL-MCNC: 9.5 MG/DL — SIGNIFICANT CHANGE UP (ref 8.4–10.5)
CHLORIDE SERPL-SCNC: 105 MMOL/L — SIGNIFICANT CHANGE UP (ref 96–108)
CMV DNA CSF QL NAA+PROBE: 235 — SIGNIFICANT CHANGE UP
CMV DNA SPEC NAA+PROBE-LOG#: 2.37 LOGIU/ML — HIGH
CO2 SERPL-SCNC: 28 MMOL/L — SIGNIFICANT CHANGE UP (ref 22–31)
CREAT SERPL-MCNC: 0.79 MG/DL — SIGNIFICANT CHANGE UP (ref 0.5–1.3)
EOSINOPHIL # BLD AUTO: 0 K/UL — SIGNIFICANT CHANGE UP (ref 0–0.5)
EOSINOPHIL NFR BLD AUTO: 0.3 % — SIGNIFICANT CHANGE UP (ref 0–6)
GLUCOSE SERPL-MCNC: 70 MG/DL — SIGNIFICANT CHANGE UP (ref 70–99)
HCT VFR BLD CALC: 28.7 % — LOW (ref 34.5–45)
HGB BLD-MCNC: 8.9 G/DL — LOW (ref 11.5–15.5)
LDH SERPL L TO P-CCNC: 269 U/L — HIGH (ref 50–242)
LYMPHOCYTES # BLD AUTO: 0.7 K/UL — LOW (ref 1–3.3)
LYMPHOCYTES # BLD AUTO: 12.9 % — LOW (ref 13–44)
MAGNESIUM SERPL-MCNC: 1.7 MG/DL — SIGNIFICANT CHANGE UP (ref 1.6–2.6)
MCHC RBC-ENTMCNC: 31 GM/DL — LOW (ref 32–36)
MCHC RBC-ENTMCNC: 33 PG — SIGNIFICANT CHANGE UP (ref 27–34)
MCV RBC AUTO: 106 FL — HIGH (ref 80–100)
MONOCYTES # BLD AUTO: 0.2 K/UL — SIGNIFICANT CHANGE UP (ref 0–0.9)
MONOCYTES NFR BLD AUTO: 4.8 % — SIGNIFICANT CHANGE UP (ref 2–14)
NEUTROPHILS # BLD AUTO: 4.3 K/UL — SIGNIFICANT CHANGE UP (ref 1.8–7.4)
NEUTROPHILS NFR BLD AUTO: 82.1 % — HIGH (ref 43–77)
PHOSPHATE SERPL-MCNC: 3.3 MG/DL — SIGNIFICANT CHANGE UP (ref 2.5–4.5)
PLATELET # BLD AUTO: 84 K/UL — LOW (ref 150–400)
POTASSIUM SERPL-MCNC: 3.9 MMOL/L — SIGNIFICANT CHANGE UP (ref 3.5–5.3)
POTASSIUM SERPL-SCNC: 3.9 MMOL/L — SIGNIFICANT CHANGE UP (ref 3.5–5.3)
PROT SERPL-MCNC: 4.3 G/DL — LOW (ref 6–8.3)
RBC # BLD: 2.7 M/UL — LOW (ref 3.8–5.2)
RBC # FLD: 19.2 % — HIGH (ref 10.3–14.5)
RH IG SCN BLD-IMP: POSITIVE — SIGNIFICANT CHANGE UP
SODIUM SERPL-SCNC: 141 MMOL/L — SIGNIFICANT CHANGE UP (ref 135–145)
TACROLIMUS SERPL-MCNC: 2.7 NG/ML — SIGNIFICANT CHANGE UP
URATE SERPL-MCNC: 5.1 MG/DL — SIGNIFICANT CHANGE UP (ref 2.5–7)
WBC # BLD: 5.2 K/UL — SIGNIFICANT CHANGE UP (ref 3.8–10.5)
WBC # FLD AUTO: 5.2 K/UL — SIGNIFICANT CHANGE UP (ref 3.8–10.5)

## 2019-04-19 PROCEDURE — 99239 HOSP IP/OBS DSCHRG MGMT >30: CPT

## 2019-04-19 RX ORDER — TACROLIMUS 5 MG/1
1 CAPSULE ORAL
Qty: 60 | Refills: 3
Start: 2019-04-19 | End: 2019-08-16

## 2019-04-19 RX ORDER — TACROLIMUS 5 MG/1
1 CAPSULE ORAL
Qty: 60 | Refills: 3 | OUTPATIENT
Start: 2019-04-19 | End: 2019-08-16

## 2019-04-19 RX ORDER — TACROLIMUS 5 MG/1
2 CAPSULE ORAL
Qty: 120 | Refills: 3
Start: 2019-04-19 | End: 2019-08-16

## 2019-04-19 RX ORDER — VANCOMYCIN HCL 1 G
1 VIAL (EA) INTRAVENOUS
Qty: 56 | Refills: 0
Start: 2019-04-19 | End: 2019-05-02

## 2019-04-19 RX ORDER — VALGANCICLOVIR 450 MG/1
1 TABLET, FILM COATED ORAL
Qty: 0 | Refills: 0 | DISCHARGE
Start: 2019-04-19

## 2019-04-19 RX ORDER — HYDROMORPHONE HYDROCHLORIDE 2 MG/ML
1 INJECTION INTRAMUSCULAR; INTRAVENOUS; SUBCUTANEOUS
Qty: 42 | Refills: 0
Start: 2019-04-19

## 2019-04-19 RX ORDER — AMITRIPTYLINE HCL 25 MG
1 TABLET ORAL
Qty: 0 | Refills: 0 | DISCHARGE
Start: 2019-04-19

## 2019-04-19 RX ORDER — TACROLIMUS 5 MG/1
2 CAPSULE ORAL
Qty: 0 | Refills: 0 | Status: DISCONTINUED | OUTPATIENT
Start: 2019-04-19 | End: 2019-04-19

## 2019-04-19 RX ADMIN — HYDROMORPHONE HYDROCHLORIDE 4 MILLIGRAM(S): 2 INJECTION INTRAMUSCULAR; INTRAVENOUS; SUBCUTANEOUS at 11:04

## 2019-04-19 RX ADMIN — ATOVAQUONE 750 MILLIGRAM(S): 750 SUSPENSION ORAL at 05:51

## 2019-04-19 RX ADMIN — VALGANCICLOVIR 450 MILLIGRAM(S): 450 TABLET, FILM COATED ORAL at 09:21

## 2019-04-19 RX ADMIN — PANTOPRAZOLE SODIUM 40 MILLIGRAM(S): 20 TABLET, DELAYED RELEASE ORAL at 18:18

## 2019-04-19 RX ADMIN — Medication 10 MILLIGRAM(S): at 18:17

## 2019-04-19 RX ADMIN — Medication 1 MILLIGRAM(S): at 12:45

## 2019-04-19 RX ADMIN — HYDROMORPHONE HYDROCHLORIDE 4 MILLIGRAM(S): 2 INJECTION INTRAMUSCULAR; INTRAVENOUS; SUBCUTANEOUS at 11:21

## 2019-04-19 RX ADMIN — TACROLIMUS 1.5 MILLIGRAM(S): 5 CAPSULE ORAL at 09:21

## 2019-04-19 RX ADMIN — Medication 125 MILLIGRAM(S): at 18:18

## 2019-04-19 RX ADMIN — URSODIOL 300 MILLIGRAM(S): 250 TABLET, FILM COATED ORAL at 05:53

## 2019-04-19 RX ADMIN — HYDROMORPHONE HYDROCHLORIDE 4 MILLIGRAM(S): 2 INJECTION INTRAMUSCULAR; INTRAVENOUS; SUBCUTANEOUS at 16:14

## 2019-04-19 RX ADMIN — LEVETIRACETAM 500 MILLIGRAM(S): 250 TABLET, FILM COATED ORAL at 18:17

## 2019-04-19 RX ADMIN — HYDROMORPHONE HYDROCHLORIDE 4 MILLIGRAM(S): 2 INJECTION INTRAMUSCULAR; INTRAVENOUS; SUBCUTANEOUS at 07:16

## 2019-04-19 RX ADMIN — Medication 5 MILLILITER(S): at 12:50

## 2019-04-19 RX ADMIN — LEVETIRACETAM 500 MILLIGRAM(S): 250 TABLET, FILM COATED ORAL at 05:52

## 2019-04-19 RX ADMIN — Medication 1 GRAM(S): at 05:53

## 2019-04-19 RX ADMIN — Medication 5 MILLILITER(S): at 09:21

## 2019-04-19 RX ADMIN — SIMETHICONE 80 MILLIGRAM(S): 80 TABLET, CHEWABLE ORAL at 18:18

## 2019-04-19 RX ADMIN — URSODIOL 300 MILLIGRAM(S): 250 TABLET, FILM COATED ORAL at 18:18

## 2019-04-19 RX ADMIN — Medication 100 MILLIGRAM(S): at 05:53

## 2019-04-19 RX ADMIN — ATOVAQUONE 750 MILLIGRAM(S): 750 SUSPENSION ORAL at 18:17

## 2019-04-19 RX ADMIN — Medication 1 GRAM(S): at 12:45

## 2019-04-19 RX ADMIN — CHLORHEXIDINE GLUCONATE 1 APPLICATION(S): 213 SOLUTION TOPICAL at 05:52

## 2019-04-19 RX ADMIN — Medication 10 MILLIGRAM(S): at 05:52

## 2019-04-19 RX ADMIN — HYDROMORPHONE HYDROCHLORIDE 4 MILLIGRAM(S): 2 INJECTION INTRAMUSCULAR; INTRAVENOUS; SUBCUTANEOUS at 05:50

## 2019-04-19 RX ADMIN — Medication 1 TABLET(S): at 12:45

## 2019-04-19 RX ADMIN — PANTOPRAZOLE SODIUM 40 MILLIGRAM(S): 20 TABLET, DELAYED RELEASE ORAL at 05:52

## 2019-04-19 RX ADMIN — Medication 125 MILLIGRAM(S): at 12:45

## 2019-04-19 RX ADMIN — Medication 125 MILLIGRAM(S): at 05:53

## 2019-04-19 RX ADMIN — Medication 5 MILLILITER(S): at 16:14

## 2019-04-19 RX ADMIN — SIMETHICONE 80 MILLIGRAM(S): 80 TABLET, CHEWABLE ORAL at 12:45

## 2019-04-19 RX ADMIN — Medication 1 GRAM(S): at 18:18

## 2019-04-19 RX ADMIN — Medication 10 MILLIGRAM(S): at 12:45

## 2019-04-19 RX ADMIN — HYDROMORPHONE HYDROCHLORIDE 4 MILLIGRAM(S): 2 INJECTION INTRAMUSCULAR; INTRAVENOUS; SUBCUTANEOUS at 16:30

## 2019-04-19 RX ADMIN — SIMETHICONE 80 MILLIGRAM(S): 80 TABLET, CHEWABLE ORAL at 05:53

## 2019-04-19 NOTE — DISCHARGE NOTE PROVIDER - HOSPITAL COURSE
Ms. 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 Her transplant course was complicated by shingles. In October, 2018 she relapsed (confirmed via flow cytometry). She was treated with inotuzumab x 2 cycles. Her course was complicated by subdural hemorrhage, refractory thrombocytopenia requiring HLA matched platelets, pneumonia, and coag negative staph bacteremia (treated with vancomycin). She was admitted on 2/7/2019 for a haplo-identical peripheral blood stem cell transplant from her son.     Ms. Galeas had a relatively uncomplicated transplant course. A baseline CT of the head was done on admission given her history of SDH and refractory thrombocytopenia which was negative. Ms. Galeas did experience pancytopenia related to the high dose chemotherapy preparative regimen. Her thrombocytopenia was refractory, and was managed by infusing 1/2 unit of platelets twice daily over 3 hours. When she became neutropenic, she was started on prophylactic ciprofloxacin. When she developed fevers, blood and urine cultures were sent, a CXR completed and the ciprofloxacin was changed to cefepime. Her cultures and CXR remained negative.     On 2/13/19, Ms. Galeas received 280ml of fresh, mobilized, haplo-identical, HPC apheresis over approximately 1 hour. Cell counts as follows:     Total MNCs (x 10^8/kg) = 4.36     CD34+ cells (x 10^6/kg) = 7.78     Cell Viability (%) = 100%     Engraftment was noted on 2/27/19 and she was sent home on Tacrolimus and Cellcept with frequent monitoring in the outpatient setting.    Most recently Ms. Galeas was being followed at [facility] weekly for blood counts and Tacrolimus levels.  She is in the process of being weaned off Cellcept and is currently taking 1G PO BID.  Most recent chimerism was 100% donor on 3/22.  At this morning's visit she admitted to not having taken her correct dose of Tacrolimus for the last few days and her level was found to be subtherapeutic at 2.3.  Her CMV level was being followed and was found to be 4889 on 3/22, Valcyte 450 mg PO BID was to be started today.  During routine vitals she was found to be hypotensive to 80's/40s.  Her WBC count was 22K with a normal differential and she was sent to [facility] ED for infectious work-up and rule out sepsis. She admits to having a possible sick contact, her 2 year old granddaughter sounded "congested" and she was hugging and kissing her yesterday.  She reports having been extremely fatigued this AM and threw up once after drinking a lot of water which is typical for her.  She denies fever, chills, rigors, dyspnea, cough or sputum, ongoing vomiting, diarrhea, abdominal pain, dysuria or flank pain.  Her urine is clear yellow.  She reports a resolved pruritic rash around her neck which she applies hydrocortisone cream to.  She has stable chronic low back pain, right sided sciatica and left knee pain which she takes oxycodone 10 mg PO bid with relief.  She reports recent thrush for which she was treated but she is unsure which medication.  She continues on fluconazole, acyclovir and Mepron for prophylaxis.  She has a distant history of CDiff and is being weaned of Vanco PO.  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    In the ED she again became hypotensive to 80s/40s and was given a 1L NS bolus.  Blood cultures, CXR, RVP pending.  CMP showing NERI. Ms. 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 Her transplant course was complicated by shingles. In October, 2018 she relapsed (confirmed via flow cytometry). She was treated with inotuzumab x 2 cycles. Her course was complicated by subdural hemorrhage, refractory thrombocytopenia requiring HLA matched platelets, pneumonia, and coag negative staph bacteremia (treated with vancomycin). She was admitted on 2/7/2019 for a haplo-identical peripheral blood stem cell transplant from her son.     Ms. Galeas had a relatively uncomplicated transplant course. A baseline CT of the head was done on admission given her history of SDH and refractory thrombocytopenia which was negative. Ms. Galeas did experience pancytopenia related to the high dose chemotherapy preparative regimen. Her thrombocytopenia was refractory, and was managed by infusing 1/2 unit of platelets twice daily over 3 hours. When she became neutropenic, she was started on prophylactic ciprofloxacin. When she developed fevers, blood and urine cultures were sent, a CXR completed and the ciprofloxacin was changed to cefepime. Her cultures and CXR remained negative.     On 2/13/19, Ms. Galeas received 280ml of fresh, mobilized, haplo-identical, HPC apheresis over approximately 1 hour. Cell counts as follows:     Total MNCs (x 10^8/kg) = 4.36     CD34+ cells (x 10^6/kg) = 7.78     Cell Viability (%) = 100%     Engraftment was noted on 2/27/19 and she was sent home on Tacrolimus and Cellcept with frequent monitoring in the outpatient setting.    Most recently Ms. Galeas was being followed at [facility] weekly for blood counts and Tacrolimus levels.  She is in the process of being weaned off Cellcept and is currently taking 1G PO BID.  Most recent chimerism was 100% donor on 3/22.  At this morning's visit she admitted to not having taken her correct dose of Tacrolimus for the last few days and her level was found to be subtherapeutic at 2.3.  Her CMV level was being followed and was found to be 4889 on 3/22, Valcyte 450 mg PO BID was to be started today.  During routine vitals she was found to be hypotensive to 80's/40s.  Her WBC count was 22K with a normal differential and she was sent to [facility] ED for infectious work-up and rule out sepsis. She admits to having a possible sick contact, her 2 year old granddaughter sounded "congested" and she was hugging and kissing her yesterday.  She reports having been extremely fatigued this AM and threw up once after drinking a lot of water which is typical for her.  She denies fever, chills, rigors, dyspnea, cough or sputum, ongoing vomiting, diarrhea, abdominal pain, dysuria or flank pain.  Her urine is clear yellow.  She reports a resolved pruritic rash around her neck which she applies hydrocortisone cream to.  She has stable chronic low back pain, right sided sciatica and left knee pain which she takes oxycodone 10 mg PO bid with relief.  She reports recent thrush for which she was treated but she is unsure which medication.  She continues on fluconazole, acyclovir and Mepron for prophylaxis.  She has a distant history of CDiff and is being weaned of Vanco PO.  She has a history of seizures related to SDH in setting of thrombocytopenia for which she continues on Keppra.    In the ED she again became hypotensive to 80s/40s and was given a 1L NS bolus.  Blood cultures, CXR, RVP pending.  CMP showed NERI.  She was admitted to .     Upon admission to , Ms. Galeas was continued on valcyte and tacrolimus. The MMF was titrated to 500mg po BID, and eventually stopped. Blood counts and chemistries were followed. She received transfusional support and electrolyte repletion as needed. Ms. Galeas continued to complain of diffuse abdominal pain, and was treated with dilaudid. She had repeat CT of the abdomen and pelvis, the most recent showing resolution of previously seen trace bilateral pleural effusions, no significant interval changes in trace abdomino-pelvic ascites with interval improvement of previously seen nonspecific mild right colonic bowel wall thickening, and a normal appendix.     FISH testing was 100% donor. Ms. 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     Currently, Ms. Galeas is stable for discharge home with outpatient follow up at the [facility]. Ms. 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 Her transplant course was complicated by shingles. In October, 2018 she relapsed (confirmed via flow cytometry). She was treated with inotuzumab x 2 cycles. Her course was complicated by subdural hemorrhage, refractory thrombocytopenia requiring HLA matched platelets, pneumonia, and coag negative staph bacteremia (treated with vancomycin). She was admitted on 2/7/2019 for a haplo-identical peripheral blood stem cell transplant from her son.     Ms. Galeas had a relatively uncomplicated transplant course. A baseline CT of the head was done on admission given her history of SDH and refractory thrombocytopenia which was negative. Ms. Galeas did experience pancytopenia related to the high dose chemotherapy preparative regimen. Her thrombocytopenia was refractory, and was managed by infusing 1/2 unit of platelets twice daily over 3 hours. When she became neutropenic, she was started on prophylactic ciprofloxacin. When she developed fevers, blood and urine cultures were sent, a CXR completed and the ciprofloxacin was changed to cefepime. Her cultures and CXR remained negative.     On 2/13/19, Ms. Galeas received 280ml of fresh, mobilized, haplo-identical, HPC apheresis over approximately 1 hour. Cell counts as follows:     Total MNCs (x 10^8/kg) = 4.36     CD34+ cells (x 10^6/kg) = 7.78     Cell Viability (%) = 100%     Engraftment was noted on 2/27/19 and she was sent home on Tacrolimus and Cellcept with frequent monitoring in the outpatient setting.    Most recently Ms. Galeas was being followed at [facility] weekly for blood counts and Tacrolimus levels.  She is in the process of being weaned off Cellcept and is currently taking 1G PO BID.  Most recent chimerism was 100% donor on 3/22.  At this morning's visit she admitted to not having taken her correct dose of Tacrolimus for the last few days and her level was found to be subtherapeutic at 2.3.  Her CMV level was being followed and was found to be 4889 on 3/22, Valcyte 450 mg PO BID was to be started today.  During routine vitals she was found to be hypotensive to 80's/40s.  Her WBC count was 22K with a normal differential and she was sent to [facility] ED for infectious work-up and rule out sepsis. She admits to having a possible sick contact, her 2 year old granddaughter sounded "congested" and she was hugging and kissing her yesterday.  She reports having been extremely fatigued this AM and threw up once after drinking a lot of water which is typical for her.  She denies fever, chills, rigors, dyspnea, cough or sputum, ongoing vomiting, diarrhea, abdominal pain, dysuria or flank pain.  Her urine is clear yellow.  She reports a resolved pruritic rash around her neck which she applies hydrocortisone cream to.  She has stable chronic low back pain, right sided sciatica and left knee pain which she takes oxycodone 10 mg PO bid with relief.  She reports recent thrush for which she was treated but she is unsure which medication.  She continues on fluconazole, acyclovir and Mepron for prophylaxis.  She has a distant history of CDiff and is being weaned of Vanco PO.  She has a history of seizures related to SDH in setting of thrombocytopenia for which she continues on Keppra.    In the ED she again became hypotensive to 80s/40s and was given a 1L NS bolus.  Blood cultures, CXR, RVP pending.  CMP showed NERI.  She was admitted to .     Upon admission to , Ms. Galeas was continued on valcyte and tacrolimus. The MMF was titrated to 500mg po BID, and eventually stopped. Blood counts and chemistries were followed. She received transfusional support and electrolyte repletion as needed. Ms. Galeas continued to complain of diffuse abdominal pain, and was treated with dilaudid. She had repeat CT of the abdomen and pelvis, the most recent showing resolution of previously seen trace bilateral pleural effusions, no significant interval changes in trace abdomino-pelvic ascites with interval improvement of previously seen nonspecific mild right colonic bowel wall thickening, and a normal appendix.     Late entry (4/25/19 2:35): On admission, Ms. Galeas met severe sepsis criteria, with leucocytosis, NERI and hypotension. NERI and hypotension resolved with fluid resuscitation.  Blood cultures were negative. 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     FISH testing was 100% donor. Ms. 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     Currently, Ms. Galeas is stable for discharge home with outpatient follow up at the [facility].

## 2019-04-19 NOTE — PROGRESS NOTE ADULT - SUBJECTIVE AND OBJECTIVE BOX
HPC Transplant Team                                                      Critical / Counseling Time Provided: 30 minutes                                                                                                                                                        Chief Complaint: indigestion, pain, in epigastric area     S: Patient seen and examined with HPC Transplant Team:   + fatigue, generalized weakness   + RUQ abdominal discomfort   appetite and energy better, walking around unit multiple times per day     Denies mouth / tongue / throat pain, dyspnea, nausea, vomiting, diarrhea    O: Vitals:   Vital Signs Last 24 Hrs  T(C): 36.5 (2019 05:24), Max: 37.2 (2019 13:49)  T(F): 97.7 (2019 05:24), Max: 99 (2019 21:41)  HR: 72 (2019 05:24) (72 - 101)  BP: 132/80 (2019 05:24) (132/80 - 156/90)  RR: 18 (2019 05:24) (17 - 18)  SpO2: 99% (2019 05:24) (97% - 99%)    Admit weight: 104.3 kg  Daily Weight in k.8 (2019 09:25)    Intake / Output:   -18 @ 07:01  -  04-19 @ 07:00  --------------------------------------------------------  IN: 1233 mL / OUT: 300 mL / NET: 933 mL      PE:   Oropharynx: no erythema or ulcerations  Oral Mucositis:   -                                                    Grade: n/a  CVS: S1, S2 RRR  Lungs: CTA throughout bilaterally   Abdomen: + BS, soft, non distended, RUQ tenderness   Gastric Mucositis:   -                                               Grade: n/a  Intestinal Mucositis:   -                                           Grade: n/a   Extremities: +1 le edema  Skin: No rash  Line: R PICC C/D/I  Neuro: alert and oriented x 3         Labs:   CBC Full  -  ( 2019 07:02 )  WBC Count : 5.2 K/uL  Hemoglobin : 8.9 g/dL  Hematocrit : 28.7 %  Platelet Count - Automated : 84 K/uL  Mean Cell Volume : 106.0 fl  Mean Cell Hemoglobin : 33.0 pg  Mean Cell Hemoglobin Concentration : 31.0 gm/dL  Auto Neutrophil # : 4.3 K/uL  Auto Lymphocyte # : 0.7 K/uL  Auto Monocyte # : 0.2 K/uL  Auto Eosinophil # : 0.0 K/uL  Auto Basophil # : 0.0 K/uL  Auto Neutrophil % : 82.1 %  Auto Lymphocyte % : 12.9 %  Auto Monocyte % : 4.8 %  Auto Eosinophil % : 0.3 %  Auto Basophil % : 0.0 %                          8.9    5.2   )-----------( 84       ( 2019 07:02 )             28.7         141  |  105  |  16  ----------------------------<  70  3.9   |  28  |  0.79    Ca    9.5      2019 07:02  Phos  3.3       Mg     1.7         TPro  4.3<L>  /  Alb  2.8<L>  /  TBili  0.2  /  DBili  x   /  AST  89<H>  /  ALT  126<H>  /  AlkPhos  161<H>      PT/INR - ( 2019 06:38 )   PT: 10.1 sec;   INR: 0.89 ratio         PTT - ( 2019 08:51 )  PTT:25.1 sec  LIVER FUNCTIONS - ( 2019 07:02 )  Alb: 2.8 g/dL / Pro: 4.3 g/dL / ALK PHOS: 161 U/L / ALT: 126 U/L / AST: 89 U/L / GGT: x           Lactate Dehydrogenase, Serum: 269 U/L ( @ 07:02)      Cultures:         Radiology:       Meds:   Antimicrobials:   atovaquone Suspension 750 milliGRAM(s) Oral two times a day  valGANciclovir 450 milliGRAM(s) Oral two times a day  vancomycin    Solution 125 milliGRAM(s) Oral every 6 hours      GI:  metoclopramide 10 milliGRAM(s) Oral every 6 hours  pantoprazole    Tablet 40 milliGRAM(s) Oral every 12 hours  simethicone 80 milliGRAM(s) Chew every 6 hours  sucralfate suspension 1 Gram(s) Oral every 6 hours  ursodiol Capsule 300 milliGRAM(s) Oral two times a day      Immunologic:   tacrolimus 1.5 milliGRAM(s) Oral <User Schedule>      Other medications:   amitriptyline 25 milliGRAM(s) Oral at bedtime  Biotene Dry Mouth Oral Rinse 5 milliLiter(s) Swish and Spit five times a day  chlorhexidine 4% Liquid 1 Application(s) Topical <User Schedule>  folic acid 1 milliGRAM(s) Oral daily  levETIRAcetam 500 milliGRAM(s) Oral two times a day  multivitamin 1 Tablet(s) Oral daily  predniSONE   Tablet 100 milliGRAM(s) Oral daily      PRN:   HYDROmorphone   Tablet 4 milliGRAM(s) Oral every 4 hours PRN  ondansetron    Tablet 8 milliGRAM(s) Oral three times a day PRN  ondansetron Injectable 8 milliGRAM(s) IV Push every 8 hours PRN  sodium chloride 0.9% lock flush 10 milliLiter(s) IV Push every 1 hour PRN      A/P:   59 year old F with a history of relapsed ALL  Status Post Allogeneic PBSCT 2019, 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 Amylase and lipase WNL, continue clears   s/p Calorie count x 72 hours (4/3-), reglan IVP pre-meals  Discontinued Cefepime and MMF on 4/3, continue on Tacro.    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   Abdominal x-ray for worsening pain unremarkable.  GI consulted.  Repeat amylase/lipase WNL.  Started  carafate/reglan/mylicon/IV Protonix for presumed gastritis.  repeat CT with oral and IV contrast c/w ascitics, likely related to virus vs gvhd, started solumedrol 100 mg IV BID   PICC placed  Decrease solumedrol 90mg BID 4/15. Plan to change to oral prednisone   Tacro level subtherapeutic. Dose increased to 1.5 mg po bid on . Repeat level on    CT abdomen/pelvis with oral and IV contrast to evaluate abdominal pain. Change to oral pain regimen.  Discharge planning     1. Infectious Disease:   atovaquone Suspension 750 milliGRAM(s) Oral two times a day  valGANciclovir 450 milliGRAM(s) Oral two times a day  vancomycin    Solution 125 milliGRAM(s) Oral every 6 hours    2. VOD Prophylaxis: Actigall, Glutamine    3. GI Prophylaxis:   metoclopramide 10 milliGRAM(s) Oral every 6 hours  pantoprazole    Tablet 40 milliGRAM(s) Oral every 12 hours  simethicone 80 milliGRAM(s) Chew every 6 hours  sucralfate suspension 1 Gram(s) Oral every 6 hours  ursodiol Capsule 300 milliGRAM(s) Oral two times a day    4. Mouthcare - NS / NaHCO3 rinses, Mycelex, Biotene, skin care     5. GVHD prophylaxis   Continue Tacro 1.5 mg bid    6. Transfuse & replete electrolytes prn     7. IV hydration, daily weights, strict I&O, prn diuresis     8. PO intake as tolerated, nutrition follow up as needed, MVI, folic acid   Advanced to soft diet as tolerated + Ensure Enlive BID     9. Antiemetics, anti-diarrhea medications:   Reglan, Ativan    10. OOB as tolerated, physical therapy consult if needed     11. Monitor coags / fibrinogen 2x week, vitamin K as needed     12. Monitor closely for clinical changes, monitor for fevers     13. Emotional support provided, plan of care discussed and questions addressed     14. Patient education done regarding chemotherapy prep, plan of care, restrictions and discharge planning     15. Continue regular social work input     I have written the above note for Dr. Rene who performed service with me in the room.   Janett Quezada NP-C ([phone])    I have seen and examined patient with NP, I agree with above note as scribed. [facility] Transplant Team                                                      Critical / Counseling Time Provided: 30 minutes                                                                                                                                                        Chief Complaint: indigestion, pain, in epigastric area     S: Patient seen and examined with HPC Transplant Team:   + fatigue, generalized weakness   + RUQ abdominal discomfort   appetite and energy better, walking around unit multiple times per day     Denies mouth / tongue / throat pain, dyspnea, nausea, vomiting, diarrhea    O: Vitals:   Vital Signs Last 24 Hrs  T(C): 36.5 (2019 05:24), Max: 37.2 (2019 13:49)  T(F): 97.7 (2019 05:24), Max: 99 (2019 21:41)  HR: 72 (2019 05:24) (72 - 101)  BP: 132/80 (2019 05:24) (132/80 - 156/90)  RR: 18 (2019 05:24) (17 - 18)  SpO2: 99% (2019 05:24) (97% - 99%)    Admit weight: 104.3 kg  Daily Weight in k.8 (2019 09:25)    Intake / Output:   -18 @ 07:01  -  04-19 @ 07:00  --------------------------------------------------------  IN: 1233 mL / OUT: 300 mL / NET: 933 mL      PE:   Oropharynx: no erythema or ulcerations  Oral Mucositis:   -                                                    Grade: n/a  CVS: S1, S2 RRR  Lungs: CTA throughout bilaterally   Abdomen: + BS, soft, non distended, RUQ tenderness   Gastric Mucositis:   -                                               Grade: n/a  Intestinal Mucositis:   -                                           Grade: n/a   Extremities: +1 le edema  Skin: No rash  Line: R PICC C/D/I  Neuro: alert and oriented x 3         Labs:   CBC Full  -  ( 2019 07:02 )  WBC Count : 5.2 K/uL  Hemoglobin : 8.9 g/dL  Hematocrit : 28.7 %  Platelet Count - Automated : 84 K/uL  Mean Cell Volume : 106.0 fl  Mean Cell Hemoglobin : 33.0 pg  Mean Cell Hemoglobin Concentration : 31.0 gm/dL  Auto Neutrophil # : 4.3 K/uL  Auto Lymphocyte # : 0.7 K/uL  Auto Monocyte # : 0.2 K/uL  Auto Eosinophil # : 0.0 K/uL  Auto Basophil # : 0.0 K/uL  Auto Neutrophil % : 82.1 %  Auto Lymphocyte % : 12.9 %  Auto Monocyte % : 4.8 %  Auto Eosinophil % : 0.3 %  Auto Basophil % : 0.0 %                          8.9    5.2   )-----------( 84       ( 2019 07:02 )             28.7         141  |  105  |  16  ----------------------------<  70  3.9   |  28  |  0.79    Ca    9.5      2019 07:02  Phos  3.3       Mg     1.7         TPro  4.3<L>  /  Alb  2.8<L>  /  TBili  0.2  /  DBili  x   /  AST  89<H>  /  ALT  126<H>  /  AlkPhos  161<H>      PT/INR - ( 2019 06:38 )   PT: 10.1 sec;   INR: 0.89 ratio    PTT - ( 2019 08:51 )  PTT:25.1 sec    LIVER FUNCTIONS - ( 2019 07:02 )  Alb: 2.8 g/dL / Pro: 4.3 g/dL / ALK PHOS: 161 U/L / ALT: 126 U/L / AST: 89 U/L / GGT: x           Lactate Dehydrogenase, Serum: 269 U/L ( @ 07:02)      Cultures:     Culture - Blood (19 @ 21:18)    Specimen Source: .Blood Blood-Venous    Culture Results:   No growth at 5 days.    Culture - Blood (19 @ 21:18)    Specimen Source: .Blood Blood-Peripheral    Culture Results:   No growth at 5 days.      Radiology:      from: CT Abdomen and Pelvis w/ Oral Cont and w/ IV Cont (19 @ 18:11)     INTERPRETATION:  resolution of previously seen trace bialteral pleural   effusions  no significant interval changes in trace abdominopelvic ascites with   interval improvement of previously seen nonspecific mild right colonic   bowel wall thickening  normal appendix\      Meds:   Antimicrobials:   atovaquone Suspension 750 milliGRAM(s) Oral two times a day  valGANciclovir 450 milliGRAM(s) Oral two times a day  vancomycin    Solution 125 milliGRAM(s) Oral every 6 hours      GI:  metoclopramide 10 milliGRAM(s) Oral every 6 hours  pantoprazole    Tablet 40 milliGRAM(s) Oral every 12 hours  simethicone 80 milliGRAM(s) Chew every 6 hours  sucralfate suspension 1 Gram(s) Oral every 6 hours  ursodiol Capsule 300 milliGRAM(s) Oral two times a day      Immunologic:   tacrolimus 1.5 milliGRAM(s) Oral <User Schedule>      Other medications:   amitriptyline 25 milliGRAM(s) Oral at bedtime  Biotene Dry Mouth Oral Rinse 5 milliLiter(s) Swish and Spit five times a day  chlorhexidine 4% Liquid 1 Application(s) Topical <User Schedule>  folic acid 1 milliGRAM(s) Oral daily  levETIRAcetam 500 milliGRAM(s) Oral two times a day  multivitamin 1 Tablet(s) Oral daily  predniSONE   Tablet 100 milliGRAM(s) Oral daily      PRN:   HYDROmorphone   Tablet 4 milliGRAM(s) Oral every 4 hours PRN  ondansetron    Tablet 8 milliGRAM(s) Oral three times a day PRN  ondansetron Injectable 8 milliGRAM(s) IV Push every 8 hours PRN  sodium chloride 0.9% lock flush 10 milliLiter(s) IV Push every 1 hour PRN      A/P:   59 year old F with a history of relapsed ALL  Status Post Allogeneic PBSCT 2019, 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 Amylase and lipase WNL, continue clears   s/p Calorie count x 72 hours (4/3-), reglan IVP pre-meals  Discontinued Cefepime and MMF on 4/3, continue on Tacro.    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   Abdominal x-ray for worsening pain unremarkable.  GI consulted.  Repeat amylase/lipase WNL.  Started  carafate/reglan/mylicon/IV Protonix for presumed gastritis.  repeat CT with oral and IV contrast c/w ascitics, likely related to virus vs gvhd, started solumedrol 100 mg IV BID   PICC placed  Decrease solumedrol 90mg BID 4/15. Plan to change to oral prednisone   Tacro level subtherapeutic. Dose increased to 1.5 mg po bid on . Repeat level on    CT abdomen/pelvis with oral and IV contrast to evaluate abdominal pain. Change to oral pain regimen.  Discharge planning     1. Infectious Disease:   atovaquone Suspension 750 milliGRAM(s) Oral two times a day  valGANciclovir 450 milliGRAM(s) Oral two times a day  vancomycin    Solution 125 milliGRAM(s) Oral every 6 hours    2. VOD Prophylaxis: Actigall, Glutamine    3. GI Prophylaxis:   metoclopramide 10 milliGRAM(s) Oral every 6 hours  pantoprazole    Tablet 40 milliGRAM(s) Oral every 12 hours  simethicone 80 milliGRAM(s) Chew every 6 hours  sucralfate suspension 1 Gram(s) Oral every 6 hours  ursodiol Capsule 300 milliGRAM(s) Oral two times a day    4. Mouthcare - NS / NaHCO3 rinses, Mycelex, Biotene, skin care     5. GVHD prophylaxis   Continue Tacro 1.5 mg bid    6. Transfuse & replete electrolytes prn     7. IV hydration, daily weights, strict I&O, prn diuresis     8. PO intake as tolerated, nutrition follow up as needed, MVI, folic acid   Advanced to soft diet as tolerated + Ensure Enlive BID     9. Antiemetics, anti-diarrhea medications:   Reglan, Ativan    10. OOB as tolerated, physical therapy consult if needed     11. Monitor coags / fibrinogen 2x week, vitamin K as needed     12. Monitor closely for clinical changes, monitor for fevers     13. Emotional support provided, plan of care discussed and questions addressed     14. Patient education done regarding chemotherapy prep, plan of care, restrictions and discharge planning     15. Continue regular social work input     I have written the above note for Dr. Rene who performed service with me in the room.   Janett Quezada NP-C ([phone])    I have seen and examined patient with NP, I agree with above note as scribed. [facility] Transplant Team                                                      Critical / Counseling Time Provided: 30 minutes                                                                                                                                                        Chief Complaint: indigestion, pain, in epigastric area     S: Patient seen and examined with HPC Transplant Team:   + fatigue, generalized weakness   + RUQ abdominal discomfort   appetite and energy better, walking around unit multiple times per day     Denies mouth / tongue / throat pain, dyspnea, nausea, vomiting, diarrhea    O: Vitals:   Vital Signs Last 24 Hrs  T(C): 36.5 (2019 05:24), Max: 37.2 (2019 13:49)  T(F): 97.7 (2019 05:24), Max: 99 (2019 21:41)  HR: 72 (2019 05:24) (72 - 101)  BP: 132/80 (2019 05:24) (132/80 - 156/90)  RR: 18 (2019 05:24) (17 - 18)  SpO2: 99% (2019 05:24) (97% - 99%)    Admit weight: 104.3 kg  Daily Weight in k.8 (2019 09:25)    Intake / Output:   -18 @ 07:01  -  04-19 @ 07:00  --------------------------------------------------------  IN: 1233 mL / OUT: 300 mL / NET: 933 mL      PE:   Oropharynx: no erythema or ulcerations  Oral Mucositis:   -                                                    Grade: n/a  CVS: S1, S2 RRR  Lungs: CTA throughout bilaterally   Abdomen: + BS, soft, non distended, RUQ tenderness   Gastric Mucositis:   -                                               Grade: n/a  Intestinal Mucositis:   -                                           Grade: n/a   Extremities: +1 le edema  Skin: No rash  Line: R PICC C/D/I  Neuro: alert and oriented x 3         Labs:   CBC Full  -  ( 2019 07:02 )  WBC Count : 5.2 K/uL  Hemoglobin : 8.9 g/dL  Hematocrit : 28.7 %  Platelet Count - Automated : 84 K/uL  Mean Cell Volume : 106.0 fl  Mean Cell Hemoglobin : 33.0 pg  Mean Cell Hemoglobin Concentration : 31.0 gm/dL  Auto Neutrophil # : 4.3 K/uL  Auto Lymphocyte # : 0.7 K/uL  Auto Monocyte # : 0.2 K/uL  Auto Eosinophil # : 0.0 K/uL  Auto Basophil # : 0.0 K/uL  Auto Neutrophil % : 82.1 %  Auto Lymphocyte % : 12.9 %  Auto Monocyte % : 4.8 %  Auto Eosinophil % : 0.3 %  Auto Basophil % : 0.0 %                          8.9    5.2   )-----------( 84       ( 2019 07:02 )             28.7         141  |  105  |  16  ----------------------------<  70  3.9   |  28  |  0.79    Ca    9.5      2019 07:02  Phos  3.3       Mg     1.7         TPro  4.3<L>  /  Alb  2.8<L>  /  TBili  0.2  /  DBili  x   /  AST  89<H>  /  ALT  126<H>  /  AlkPhos  161<H>      PT/INR - ( 2019 06:38 )   PT: 10.1 sec;   INR: 0.89 ratio    PTT - ( 2019 08:51 )  PTT:25.1 sec    LIVER FUNCTIONS - ( 2019 07:02 )  Alb: 2.8 g/dL / Pro: 4.3 g/dL / ALK PHOS: 161 U/L / ALT: 126 U/L / AST: 89 U/L / GGT: x           Lactate Dehydrogenase, Serum: 269 U/L ( @ 07:02)      Cultures:     Culture - Blood (19 @ 21:18)    Specimen Source: .Blood Blood-Venous    Culture Results:   No growth at 5 days.    Culture - Blood (19 @ 21:18)    Specimen Source: .Blood Blood-Peripheral    Culture Results:   No growth at 5 days.      Radiology:      from: CT Abdomen and Pelvis w/ Oral Cont and w/ IV Cont (19 @ 18:11)     INTERPRETATION:  resolution of previously seen trace bialteral pleural   effusions  no significant interval changes in trace abdominopelvic ascites with   interval improvement of previously seen nonspecific mild right colonic   bowel wall thickening  normal appendix\      Meds:   Antimicrobials:   atovaquone Suspension 750 milliGRAM(s) Oral two times a day  valGANciclovir 450 milliGRAM(s) Oral two times a day  vancomycin    Solution 125 milliGRAM(s) Oral every 6 hours      GI:  metoclopramide 10 milliGRAM(s) Oral every 6 hours  pantoprazole    Tablet 40 milliGRAM(s) Oral every 12 hours  simethicone 80 milliGRAM(s) Chew every 6 hours  sucralfate suspension 1 Gram(s) Oral every 6 hours  ursodiol Capsule 300 milliGRAM(s) Oral two times a day      Immunologic:   tacrolimus 1.5 milliGRAM(s) Oral <User Schedule>      Other medications:   amitriptyline 25 milliGRAM(s) Oral at bedtime  Biotene Dry Mouth Oral Rinse 5 milliLiter(s) Swish and Spit five times a day  chlorhexidine 4% Liquid 1 Application(s) Topical <User Schedule>  folic acid 1 milliGRAM(s) Oral daily  levETIRAcetam 500 milliGRAM(s) Oral two times a day  multivitamin 1 Tablet(s) Oral daily  predniSONE   Tablet 100 milliGRAM(s) Oral daily      PRN:   HYDROmorphone   Tablet 4 milliGRAM(s) Oral every 4 hours PRN  ondansetron    Tablet 8 milliGRAM(s) Oral three times a day PRN  ondansetron Injectable 8 milliGRAM(s) IV Push every 8 hours PRN  sodium chloride 0.9% lock flush 10 milliLiter(s) IV Push every 1 hour PRN      A/P:   59 year old F with a history of relapsed ALL  Status Post Allogeneic PBSCT 2019, 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 Amylase and lipase WNL, continue clears   s/p Calorie count x 72 hours (4/3-), reglan IVP pre-meals  Discontinued Cefepime and MMF on 4/3, continue on Tacro.    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   Abdominal x-ray for worsening pain unremarkable.  GI consulted.  Repeat amylase/lipase WNL.  Started  carafate/reglan/mylicon/IV Protonix for presumed gastritis.  repeat CT with oral and IV contrast c/w ascitics, likely related to virus vs gvhd, started solumedrol 100 mg IV BID   PICC placed  Decrease solumedrol 90mg BID 4/15. Plan to change to oral prednisone   Tacro level subtherapeutic. Dose increased to 1.5 mg po bid on . Repeat level on    CT abdomen/pelvis with oral and IV contrast to evaluate abdominal pain. Change to oral pain regimen.  Discharge today with PICC    1. Infectious Disease:   atovaquone Suspension 750 milliGRAM(s) Oral two times a day  valGANciclovir 450 milliGRAM(s) Oral two times a day  vancomycin    Solution 125 milliGRAM(s) Oral every 6 hours    2. VOD Prophylaxis: Actigall, Glutamine    3. GI Prophylaxis:   metoclopramide 10 milliGRAM(s) Oral every 6 hours  pantoprazole    Tablet 40 milliGRAM(s) Oral every 12 hours  simethicone 80 milliGRAM(s) Chew every 6 hours  sucralfate suspension 1 Gram(s) Oral every 6 hours  ursodiol Capsule 300 milliGRAM(s) Oral two times a day    4. Mouthcare - NS / NaHCO3 rinses, Mycelex, Biotene, skin care     5. GVHD prophylaxis   Continue Tacro 1.5 mg bid    6. Transfuse & replete electrolytes prn     7. IV hydration, daily weights, strict I&O, prn diuresis     8. PO intake as tolerated, nutrition follow up as needed, MVI, folic acid   Advanced to soft diet as tolerated + Ensure Enlive BID     9. Antiemetics, anti-diarrhea medications:   Reglan, Ativan    10. OOB as tolerated, physical therapy consult if needed     11. Monitor coags / fibrinogen 2x week, vitamin K as needed     12. Monitor closely for clinical changes, monitor for fevers     13. Emotional support provided, plan of care discussed and questions addressed     14. Patient education done regarding chemotherapy prep, plan of care, restrictions and discharge planning     15. Continue regular social work input     I have written the above note for Dr. Rene who performed service with me in the room.   Janett Quezada NP-C ([phone])    I have seen and examined patient with NP, I agree with above note as scribed.

## 2019-04-19 NOTE — PROGRESS NOTE ADULT - PROVIDER SPECIALTY LIST ADULT
BMT/SCT
Intervent Radiology
Intervent Radiology
BMT/SCT

## 2019-04-19 NOTE — DISCHARGE NOTE PROVIDER - NSDCCPCAREPLAN_GEN_ALL_CORE_FT
PRINCIPAL DISCHARGE DIAGNOSIS  Diagnosis: History of stem cell transplant  Assessment and Plan of Treatment: Maintain counts, remain free from infection.  Notify MD and report to ER for any temperature greater than or equal to 100.4 degrees, intractable nausea, vomiting, diarrhea, or uncontrolled bleeding.        SECONDARY DISCHARGE DIAGNOSES  Diagnosis: Nonspecific colitis  Assessment and Plan of Treatment: Continue Oral Vancomycin as prescribed PRINCIPAL DISCHARGE DIAGNOSIS  Diagnosis: History of stem cell transplant  Assessment and Plan of Treatment: Maintain counts, remain free from infection.  Notify MD and report to ER for any temperature greater than or equal to 100.4 degrees, intractable nausea, vomiting, diarrhea, or uncontrolled bleeding.        SECONDARY DISCHARGE DIAGNOSES  Diagnosis: Acute [id number] disease  Assessment and Plan of Treatment: 1. Continue prednisone taper as directed by Dr. Bentley   2. Continue tacrolimus as directed by Dr. Bentley   3. On the day you have an appointment at the [facility], do NOT take your Tacrolimus morning dose. Instead, bring it with you to the [facility]. After you have your bloods drawn you may take your morning dose of Tacrolimus.      Diagnosis: Nonspecific colitis  Assessment and Plan of Treatment: Continue Oral Vancomycin as prescribed

## 2019-04-19 NOTE — PROGRESS NOTE ADULT - REASON FOR ADMISSION
hypotension, elevated WBC

## 2019-04-19 NOTE — PROGRESS NOTE ADULT - ATTENDING COMMENTS
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  cmv positive ct with vague findings in pancreas amylase and lipase nl unclear etiology of upper GI discomfort was improving slowly. Patient complaints of abdominal pain localized to the epigastric area and right upper quadrant with palpation. 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 CT scan of the abdomen with IV and PO contrast ordered. Patient is using Dilaudid  IV total of 12 mg /24 hrs. Patient is ambulating by herself, tolerating meals, denies N/V or diarrhea.     ID: valcyte 450 bid, cmv pcr stable since 4/5.  PCR 4/8/19 28451. 4/15/19 1211.  cont valcyte 450 bid  non-neutropenic on px abx  on empiric po vanco  125 mg q 6hrs for c diff being tapered slowly     GVHD: MMF 1G PO BID DC on 4/4. 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  Tacro 3.4 (4/17/19) increased to 1.5 mg BID, recheck Friday.    Continue  full diet, tolerated well.   Most recent FISH for Y on  100% donor.    Continue Actigall for VOD prevention  Continue Keppra for h/o seizures  Continue Mepron for PCP ppx  Continue supportive care.     Continue carafate, protonix. 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  cmv positive ct with vague findings in pancreas amylase and lipase nl unclear etiology of upper GI discomfort was improving slowly. Patient complaints of abdominal pain localized to the epigastric area and right upper quadrant with palpation. 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 CT scan of the abdomen with IV and PO contrast 4/18/19 showed improvement of mesenteric edema. Patient is on Dilaudid  po 4 mg  q3 hrs prn for pain. Patient is ambulating by herself, tolerating meals, denies N/V or diarrhea.     ID: valcyte 450 bid, cmv pcr stable since 4/5.  PCR 4/8/19 25545. 4/15/19 1211. 4/17/19 CMV   cont valcyte 450 bid  non-neutropenic on px abx  on empiric po vanco  125 mg q 6hrs for c diff being tapered slowly     GVHD: MMF 1G PO BID DC on 4/4. gvhd grade 2 on Prednisone 100 mg daily. Continue taper as outpatient.   Tacro 2.7 (4/19/19) increased to 2 mg BID.   Continue  full diet, tolerated well.   Most recent FISH for Y on  100% donor.    Continue Actigall for VOD prevention  Continue Keppra for h/o seizures  Continue Mepron for PCP ppx  Plan to DC home today with follow up in [facility] next week.

## 2019-04-19 NOTE — DISCHARGE NOTE PROVIDER - NSDCFUADDAPPT_GEN_ALL_CORE_FT
To the [facility] for Follow up on _____ You have a follow up appointment on Monday, 4/22/19 with Germaine Deleon NP at 9:30AM. Please arrive 15 minutes early to have your blood drawn.   You have a follow up appointment on Thursday, 4/25/19 at 11am with Dr. Bentley. Please arrive 15 minutes early to have your blood drawn.   You have a follow up appointment on Friday, 5/3/19 at 11 am with Rowena El NP. Please arrive 15 minutes early to have your blood drawn.   You have a follow up appointment on Friday, 5/10/19 at 11am with Rowena El NP. Please arrive 15 minutes early to have your blood drawn.   You have a follow up appointment with Dr. Bentley on Thursday, 5/16/19 at 11am with Dr. Bentley. Please arrive 15 minutes early to have your blood drawn.

## 2019-04-19 NOTE — DISCHARGE NOTE PROVIDER - CARE PROVIDER_API CALL
Luke Bentley)  Internal Medicine; Medical Oncology  [address] [place] [address]  Phone: [phone]  Fax: [phone]  Follow Up Time:

## 2019-04-20 PROCEDURE — 74176 CT ABD & PELVIS W/O CONTRAST: CPT

## 2019-04-20 PROCEDURE — 84100 ASSAY OF PHOSPHORUS: CPT

## 2019-04-20 PROCEDURE — 85027 COMPLETE CBC AUTOMATED: CPT

## 2019-04-20 PROCEDURE — 88237 TISSUE CULTURE BONE MARROW: CPT

## 2019-04-20 PROCEDURE — 36573 INSJ PICC RS&I 5 YR+: CPT

## 2019-04-20 PROCEDURE — C1751: CPT

## 2019-04-20 PROCEDURE — 99285 EMERGENCY DEPT VISIT HI MDM: CPT | Mod: 25

## 2019-04-20 PROCEDURE — 84550 ASSAY OF BLOOD/URIC ACID: CPT

## 2019-04-20 PROCEDURE — 80197 ASSAY OF TACROLIMUS: CPT

## 2019-04-20 PROCEDURE — 87581 M.PNEUMON DNA AMP PROBE: CPT

## 2019-04-20 PROCEDURE — 87798 DETECT AGENT NOS DNA AMP: CPT

## 2019-04-20 PROCEDURE — 83605 ASSAY OF LACTIC ACID: CPT

## 2019-04-20 PROCEDURE — 86901 BLOOD TYPING SEROLOGIC RH(D): CPT

## 2019-04-20 PROCEDURE — 82962 GLUCOSE BLOOD TEST: CPT

## 2019-04-20 PROCEDURE — 74018 RADEX ABDOMEN 1 VIEW: CPT

## 2019-04-20 PROCEDURE — 82330 ASSAY OF CALCIUM: CPT

## 2019-04-20 PROCEDURE — 85014 HEMATOCRIT: CPT

## 2019-04-20 PROCEDURE — 87633 RESP VIRUS 12-25 TARGETS: CPT

## 2019-04-20 PROCEDURE — 74177 CT ABD & PELVIS W/CONTRAST: CPT

## 2019-04-20 PROCEDURE — 82435 ASSAY OF BLOOD CHLORIDE: CPT

## 2019-04-20 PROCEDURE — 80053 COMPREHEN METABOLIC PANEL: CPT

## 2019-04-20 PROCEDURE — 97530 THERAPEUTIC ACTIVITIES: CPT

## 2019-04-20 PROCEDURE — 82803 BLOOD GASES ANY COMBINATION: CPT

## 2019-04-20 PROCEDURE — 83690 ASSAY OF LIPASE: CPT

## 2019-04-20 PROCEDURE — 86922 COMPATIBILITY TEST ANTIGLOB: CPT

## 2019-04-20 PROCEDURE — 82947 ASSAY GLUCOSE BLOOD QUANT: CPT

## 2019-04-20 PROCEDURE — 86900 BLOOD TYPING SEROLOGIC ABO: CPT

## 2019-04-20 PROCEDURE — 83930 ASSAY OF BLOOD OSMOLALITY: CPT

## 2019-04-20 PROCEDURE — 83615 LACTATE (LD) (LDH) ENZYME: CPT

## 2019-04-20 PROCEDURE — 85730 THROMBOPLASTIN TIME PARTIAL: CPT

## 2019-04-20 PROCEDURE — 82728 ASSAY OF FERRITIN: CPT

## 2019-04-20 PROCEDURE — 82150 ASSAY OF AMYLASE: CPT

## 2019-04-20 PROCEDURE — 87040 BLOOD CULTURE FOR BACTERIA: CPT

## 2019-04-20 PROCEDURE — 84295 ASSAY OF SERUM SODIUM: CPT

## 2019-04-20 PROCEDURE — 80202 ASSAY OF VANCOMYCIN: CPT

## 2019-04-20 PROCEDURE — 83735 ASSAY OF MAGNESIUM: CPT

## 2019-04-20 PROCEDURE — 87486 CHLMYD PNEUM DNA AMP PROBE: CPT

## 2019-04-20 PROCEDURE — 80048 BASIC METABOLIC PNL TOTAL CA: CPT

## 2019-04-20 PROCEDURE — 97116 GAIT TRAINING THERAPY: CPT

## 2019-04-20 PROCEDURE — 84132 ASSAY OF SERUM POTASSIUM: CPT

## 2019-04-20 PROCEDURE — 85610 PROTHROMBIN TIME: CPT

## 2019-04-20 PROCEDURE — 86850 RBC ANTIBODY SCREEN: CPT

## 2019-04-20 PROCEDURE — 88271 CYTOGENETICS DNA PROBE: CPT

## 2019-04-20 PROCEDURE — 82565 ASSAY OF CREATININE: CPT

## 2019-04-20 PROCEDURE — 88275 CYTOGENETICS 100-300: CPT

## 2019-04-20 PROCEDURE — 97161 PT EVAL LOW COMPLEX 20 MIN: CPT

## 2019-04-20 PROCEDURE — 71045 X-RAY EXAM CHEST 1 VIEW: CPT

## 2019-04-22 ENCOUNTER — APPOINTMENT (OUTPATIENT)
Dept: INFUSION THERAPY | Facility: HOSPITAL | Age: 59
End: 2019-04-22

## 2019-04-22 ENCOUNTER — APPOINTMENT (OUTPATIENT)
Dept: HEMATOLOGY ONCOLOGY | Facility: CLINIC | Age: 59
End: 2019-04-22

## 2019-04-22 ENCOUNTER — INBOUND DOCUMENT (OUTPATIENT)
Age: 59
End: 2019-04-22

## 2019-04-22 RX ORDER — MYCOPHENOLATE MOFETIL 500 MG/1
500 TABLET ORAL TWICE DAILY
Qty: 120 | Refills: 0 | Status: DISCONTINUED | COMMUNITY
Start: 2019-02-25 | End: 2019-04-22

## 2019-04-23 ENCOUNTER — LABORATORY RESULT (OUTPATIENT)
Age: 59
End: 2019-04-23

## 2019-04-23 ENCOUNTER — RESULT REVIEW (OUTPATIENT)
Age: 59
End: 2019-04-23

## 2019-04-23 ENCOUNTER — APPOINTMENT (OUTPATIENT)
Dept: HEMATOLOGY ONCOLOGY | Facility: CLINIC | Age: 59
End: 2019-04-23
Payer: MEDICARE

## 2019-04-23 ENCOUNTER — APPOINTMENT (OUTPATIENT)
Dept: INFUSION THERAPY | Facility: HOSPITAL | Age: 59
End: 2019-04-23

## 2019-04-23 VITALS
SYSTOLIC BLOOD PRESSURE: 135 MMHG | WEIGHT: 223.11 LBS | TEMPERATURE: 98.3 F | BODY MASS INDEX: 39.77 KG/M2 | HEART RATE: 85 BPM | DIASTOLIC BLOOD PRESSURE: 95 MMHG | OXYGEN SATURATION: 97 % | RESPIRATION RATE: 18 BRPM

## 2019-04-23 LAB
BASOPHILS # BLD AUTO: 0 K/UL — SIGNIFICANT CHANGE UP (ref 0–0.2)
BASOPHILS NFR BLD AUTO: 0.3 % — SIGNIFICANT CHANGE UP (ref 0–2)
EOSINOPHIL # BLD AUTO: 0 K/UL — SIGNIFICANT CHANGE UP (ref 0–0.5)
EOSINOPHIL NFR BLD AUTO: 0.3 % — SIGNIFICANT CHANGE UP (ref 0–6)
HCT VFR BLD CALC: 35.8 % — SIGNIFICANT CHANGE UP (ref 34.5–45)
HGB BLD-MCNC: 12.1 G/DL — SIGNIFICANT CHANGE UP (ref 11.5–15.5)
LYMPHOCYTES # BLD AUTO: 1.2 K/UL — SIGNIFICANT CHANGE UP (ref 1–3.3)
LYMPHOCYTES # BLD AUTO: 11.8 % — LOW (ref 13–44)
MCHC RBC-ENTMCNC: 33.8 G/DL — SIGNIFICANT CHANGE UP (ref 32–36)
MCHC RBC-ENTMCNC: 35.8 PG — HIGH (ref 27–34)
MCV RBC AUTO: 106 FL — HIGH (ref 80–100)
MONOCYTES # BLD AUTO: 0.1 K/UL — SIGNIFICANT CHANGE UP (ref 0–0.9)
MONOCYTES NFR BLD AUTO: 0.7 % — LOW (ref 2–14)
NEUTROPHILS # BLD AUTO: 8.5 K/UL — HIGH (ref 1.8–7.4)
NEUTROPHILS NFR BLD AUTO: 86.9 % — HIGH (ref 43–77)
PLATELET # BLD AUTO: 104 K/UL — LOW (ref 150–400)
RBC # BLD: 3.37 M/UL — LOW (ref 3.8–5.2)
RBC # FLD: 18.5 % — HIGH (ref 10.3–14.5)
WBC # BLD: 9.8 K/UL — SIGNIFICANT CHANGE UP (ref 3.8–10.5)
WBC # FLD AUTO: 9.8 K/UL — SIGNIFICANT CHANGE UP (ref 3.8–10.5)

## 2019-04-23 PROCEDURE — 99214 OFFICE O/P EST MOD 30 MIN: CPT

## 2019-04-24 NOTE — HISTORY OF PRESENT ILLNESS
[de-identified] : Ms. 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 Her induction course was complicated by tutu fever. She received cycle 2 mid may. BM BX prior was morphologically remission but pcr was positive. Cycle 4 was completed. Prolonged course with fever, pericardial effusion, possible fungal pna. I suspect fever and effusion due to sprycel. She also had neurologic / mental status changes after MTX. She has an omaya. She had two MTX IT. She feels well today.  s/p step 1 for stem cell mobilization..in CR1..S/P auto stem cell transplant with tam 200 mg/m2 prep...\par \par 10/4/18 - 11/20/18 : 58F hx HTN, Obesity, Ph(+) ALL s/p R Hypercavd x4 cycles IT MTX x2 s/p stem cell collection w/ Step 1(HD vp16 plus arac) stem cell mobilization regime. \par FISH and PCR negative. s/p Tam-Auto on 12/16/16. Patient prior to presentation had severe burning right abdominal pain with vesicular lesions and was diagnosed with shingles and received a 10 day course of antiviral medications. \par Patient on presentation had residual neuropathic pain.  Patient presented to the ER with severe occipital parietal headaches with nausea and vomiting. \par Patient also admitted to easy bruising mouth sores and dark stools. \par CT Head was done for complaints of headache which was (+) for SDH. Neurosurgery was consulted on initial detection of SDH, recommendations to keep platelets >80,000 was made.  LGIB was attributed to profound thrombocytopenia. 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 A PICC line was placed for chemotherapy and was initiated with Inotuzamab on Day 1, 8 and 15. IVF and Allopurinol for TLS. The patient has had refractory thrombocytopenia. H L A platelets were infused when they were available. when H L A platelets were not available 1/2 unit of platelet was infused over 3 hours. \par Follow up CT Head on 10/11 was stable and the patient continued to receive 1/2 units of platelets over 3 hours every 12 hours. The patient received last dose of Inotuzamab Cycle 1 on 10/22. On 10/15 patient was febrile,  a CT of the chest/abd/pelvis was done which showed scattered patchy ground glass opacities in right upper and lower lobes, suggestive of infection, patient received a course of IV antibiotics for Pneumonia. On 11/2 Blood cultures were found to be (+) for Co-agulase (-) Staph and patient received a course of Vancomycin. \par Repeat CT Head was completed on 10/23 for follow up and showed some new bleeding into previous collection. Findings were discussed with Neuro Surgery. \par HLA platelets were administered when available for refractory thrombocytopenia. \par Cycle 2 Inotuzumab was started on 11/6 which was given on Days 1, 8, 15. \par Patient tolerated second cycle without any adverse reactions. Patient for possible future Haploidentical Allogeneic stem cell transplant after discharge. \par Pre-BMT testing completed prior to discharge: Echo, MUGA, Xray sinuses, 24-Hr Urine for Creatinine. \par Patient had intermittent severe headaches on 11/3 and 11/15, repeat CTs showed nearly resolved hemorrhage. Headaches were managed with analgesics Fentanyl patch 37 mcg/hr and Oxycodone IR 15 mg q3 prn prior to discharge home. She is on ponatinib QOD b/c of increased LFT's.  \par \par Discharge summary for transplant (2/07-3/04/19):\par Ms. 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 Her transplant course was complicated by shingles. In October, 2018 she relapsed (confirmed via flow cytometry). She was treated with inotuzumab x 2 cycles. Her course was complicated by subdural hemorrhage, refractory thrombocytopenia requiring HLA matched platelets, pneumonia, and coag negative staph bacteremia (treated with vancomycin). She is now admitted for a haplo-identical peripheral blood stem cell transplant from her son. \par \par Upon admission, a TLC was placed in IR. Ms. Galeas received IV hydration, pain management, antiemetics, nutritional support, antiviral / antibacterial / antifungal / PCP / GI / VOD (SOS) prophylaxis. Labs were monitored on a daily basis, and she received electrolyte repletion and transfusional support as needed. \par \par Ms. Galeas had a relatively uncomplicated transplant course. A baseline CT of the head was done on admission given her history of SDH and refractory thrombocytopenia. The baseline CT was negative. Ms. Galeas did experience pancytopenia related to the high dose chemotherapy preparative regimen. Her thrombocytopenia was refractory, and was managed by infusing 1/2 unit of platelets twice daily over 3 hours. Ms. Galeas also experienced neutropenic fevers. When she became neutropenic, she was started on prophylactic ciprofloxacin. When she developed fevers, blood and urine cultures were sent, a CXR completed and the ciprofloxacin was changed to cefepime. Her cultures and CXR remained negative. \par \par On 2/13/19, Ms. Galeas received 280ml of fresh, mobilized, haplo-identical, HPC apheresis over approximately 1 hour. Cell counts as follows: \par Total MNCs (x 10^8/kg) = 4.36 \par CD34+ cells (x 10^6/kg) = 7.78 \par Cell Viability (%) = 100% \par \par Engraftment was noted on 2/27/19. Post engraftment, the cefepime and zarxio were discontinued. Tacrolimus levels and CMV PCR were monitored twice weekly. A FISH for Y was sent to determine chimerism, with results pending. \par \par \par  [de-identified] : 3/15/19 visit, day +30 of SCT today. Ongoing fatigue, occasional dizziness but no falls. Decreased appetite but tolerating adequate PO intake/hydration. Occasional nausea without vomiting, PRN Reglan has been helpful. Small volume watery diarrhea anywhere from 1-3x/day without abdominal pain, hx of C Diff infection in this hospital. New onset pruritic rash on face, neck, upper chest, and upper back. Stable chronic LBP related to sciatica. Ongoing left knee throbbing type pain which started in the hospital after Zarxio, right knee pain resolved but left knee pain persists, pain comes and goes regardless of weightbearing or movement, PRN oxycodone 4 tabs/day has been helpful. Compliant with post transplant meds and restrictions. Currently taking FK 1mg bid which she did not take prior to lab draw today. Denies fever, chills, headaches, blurred vision, mucositis/odynophagia, chest pain, SOB, cough, vomiting, abdominal pain, dysuria, LE edema, bleeding\par \par On 3/22/19 visit, day +37 of SCT today. Ongoing fatigue. Pruritic rash has resolved with hydrocortisone cream. Dysgeusia with significant dry mouth, reportedly adequate PO intake and hydration, urine reportedly light yellow to clear. Intermittent nausea without vomiting, PRN anti-emetics helpful. Intermittent diarrhea 0-3x/day though not every day, notes it is worse after taking vancomycin. Stable chronic low back pain with right sided sciatica. Left thigh bone pain resolved but ongoing left knee pain, using PRN oxycodone 10mg bid with adequate pain control. Knee pain is worse with weight bearing and certain movements. Compliant with post transplant meds and restrictions. Currently taking FK 1mg in AM and 1.5mg in PM which she did not take prior to lab draw today. Denies fever, chills, headaches, blurred vision, mucositis/odynophagia, chest pain, SOB, cough, abdominal pain/cramping, dysuria, LE edema, rash, bleeding\par \par On 3/29/19 visit, day +44 of SCT today. Asymptomatic hypotension today with BP of 81/41, unable to obtain a manual BP despite multiple attempts in office. Very tired today but reports over past week energy level waxes and wanes with good days and tougher days. Intermittent hot flashes which she had prior to transplant. Decreased appetite and PO intake though reported adequate hydration. Occasional nausea without vomiting at home, however vomiting x1 in office today which she attributes to drinking too much water prior to lab draw. Soft/Loose stools up to 2x/day, no abdominal pain. Left knee pain is somewhat improved, she did not go for xray imaging last week d/t long wait time. Compliant with post transplant meds a restrictions. Currently taking FK 0.5mg daily d/t miscommunication with her daughter-in-law who was instructed that pt should take 0.5mg in AM and 1mg in PM. Denies fever, chills, headaches, dizziness, blurred vision, mucositis/odynophagia, chest pain, SOB, cough, watery diarrhea, abdominal pain, dysuria, LE edema, rash, bleeding.\par \par On 3/29/19, patient was admitted as she had not taken her correct dose of Tacrolimus for the last few days and her level was found to be subtherapeutic at 2.3. Her CMV level was being followed and was found to be 4889 on 3/22, Valcyte 450 mg PO BID was to be started today.  During routine vitals she was found to be hypotensive to 80's/40s.  Her WBC count was 22K with a normal differential and she was sent to [facility] ED for infectious work-up and rule out sepsis. She admits to having a possible sick contact, her 2 year old granddaughter sounded "congested" and she was hugging and kissing her yesterday.  She reports having been extremely fatigued this AM and threw up once after drinking a lot of water which is typical for her.  She denies fever, chills, rigors, dyspnea, cough or sputum, ongoing vomiting, diarrhea, abdominal pain, dysuria or flank pain.  Her urine is clear yellow.  She reports a resolved pruritic rash around her neck which she applies hydrocortisone cream to.  She has stable chronic low back pain, right sided sciatica and left knee pain which she takes oxycodone 10 mg PO bid with relief.  \par \par She reports recent thrush for which she was treated but she is unsure which medication. She continues on fluconazole, acyclovir and Mepron for prophylaxis. She has a \par distant history of CDiff and is being weaned of Vanco PO.  She has a history of seizures related to SDH in setting of thrombocytopenia for which she continues on Keppra. In the ED she again became hypotensive to 80s/40s and was given a 1L NS bolus. Blood cultures, CXR, RVP pending.  CMP showed NERI.  She was admitted to [facility]. \par \par Upon admission to [facility], Ms. Galeas was continued on valcyte and tacrolimus. The MMF was titrated to 500mg po BID, and eventually stopped. Blood counts and chemistries were followed. She received transfusional support and electrolyte repletion as needed. Ms. Galeas continued to complain of diffuse abdominal pain, and was treated with dilaudid. She had repeat CT of the abdomen and pelvis, the most recent showing resolution of previously seen trace bilateral pleural effusions, no significant interval changes in trace abdomino-pelvic ascites with interval improvement of previously seen nonspecific mild right colonic bowel wall thickening, and a normal appendix. FISH testing was 100% donor. \par \par Ms. 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 Currently, Ms. Galeas is stable for discharge home on 4/19/19.\par \par On 4/23/19, patient presents for follow up visit after recent hospitalization. Discharged on 4/19/19 from [facility]. States had diarrhea yesterday but symptoms have resolved. Single lumen Right PICC in place and flushed today without difficulty. Spoke with home care RN today and home care agency agrees to flush PICC line tomorrow. Patient is currently on prednisone 100 mg daily and tacrolimus 1.5 mg BID. Denies fever, rash, nausea, vomiting, mouth sores or dysuria. Denies SOB, chest pain or B/L LE edema. Walks with cane. States has not been taking the oxycodone. Denies any pain at this time.\par

## 2019-04-24 NOTE — REASON FOR VISIT
[Follow-Up Visit] : a follow-up visit for [Acute Lymphoblastic Leukemia] : acute lymphoblastic leukemia [FreeTextEntry2] : S/p haplo son SCT on 2/13/19

## 2019-04-24 NOTE — PHYSICAL EXAM
[Ambulatory and capable of all self care but unable to carry out any work activities] : Status 2- Ambulatory and capable of all self care but unable to carry out any work activities. Up and about more than 50% of waking hours [Obese] : obese [Normal] : no peripheral adenopathy appreciated [de-identified] :  regular rhythm, normal s1s2 [de-identified] : no edema. Right arm single lumen PICC. Site clean dry and intact [de-identified] : no knee erythema or swelling noted, ambulates with a cane

## 2019-04-24 NOTE — ASSESSMENT
[FreeTextEntry1] : Kellen Galeas is a 60y/o female with a history of Ph positive ALL with the following comorbidities being managed:\par \par 1) Ph+ ALL\par S/p haplo son PBSCT on 2/13/19\par FISH for Y = 100% donor on 4/9/19\par Post transplant BMbx pending\par \par 2) Heme\par Progressive leukocytosis, ?infection in setting of hypotension \par    WBC 9.8   ANC 8.5   Hgb 12.1   \par Continue multivitamin and folic acid\par \par 3) ID\par Continue ppx:\par - Valganciclovir 450 mg BID\par - Fluconazole 200 mg- take 2 tablets daily\par - Mepron 750 mg bid\par \par CMV viremia\par 3/22/19 CMV PCR = 4,889\par Valcyte 450 mg bid started 3/29/19\par Continue to monitor weekly\par \par Hx of C Diff infection\par Continue vanco 125 mg 4 times a day- decreased to TID today on 4/23/19\par \par 4) GVHD\par Skin 0   Liver 0   GI 0 - overall stage 0\par Tacrolimus 1.5 mg BID - f/u today's level for ongoing dosing \par MMF discontinued while inpatient \par Currently on prednisone 100 mg daily. As of today 4/23/19 decrease prednisone to 90 mg daily.\par \par Skin GVHD\par Pruritic erythematous rash of lower half of face, neck, upper chest, and upper back (~23.5%) noted 3/15/19\par Resolved as of 3/22/19 with hydrocortisone cream alone.\par Ms. Galeas was started on steroids on 4/11/19 for presumed stage 1, grade 2 acute GVHD of the gut with improvement in symptoms. \par \par 5) GI\par Continue ppx:\par - Protonix 40 mg daily\par - Ursodiol 300 mg bid\par - PRN Zofran and Reglan for nausea/vomiting\par \par Transaminitis\par Mild transaminitis noted since 3/15/19\par Continue to monitor \par \par 6) Other\par Subdural hemorrhage with headaches - continue ppx Keppra 500 mg bid\par HTN - losartan 100 mg daily and amlodipine 5 mg daily \par Magnesium oxide- 400 mg TID\par Pain - chronic LBP 2/2 sciatica, continue PRN oxycodone and amitriptyline 25 mg daily at bedtime\par \par 7) Plan/Dispo\par Post transplant diet and crowd restrictions remain in place. \par Home Care RN will come to flush PICC line tomorrow. \par Patient aware to call immediately if experiences fever, chills, severe nausea, vomiting or diarrhea, rash or mouth sores. \par Follow up in one week with QUAN Guaman\par

## 2019-04-24 NOTE — REVIEW OF SYSTEMS
[Fatigue] : fatigue [Negative] : Endocrine [Fever] : no fever [Night Sweats] : no night sweats [Chills] : no chills [Dry Eyes] : no dryness of the eyes [Vision Problems] : no vision problems [Dysphagia] : no dysphagia [Nosebleeds] : no nosebleeds [Odynophagia] : no odynophagia [Mucosal Pain] : no mucosal pain [Chest Pain] : no chest pain [Shortness Of Breath] : no shortness of breath [Lower Ext Edema] : no lower extremity edema [Cough] : no cough [Abdominal Pain] : no abdominal pain [Vomiting] : no vomiting [Constipation] : no constipation [Dysuria] : no dysuria [Muscle Weakness] : no muscle weakness [Dizziness] : no dizziness [Fainting] : no fainting [FreeTextEntry7] : Diarrhea yesterday but has resolved  [de-identified] : Ambulates with a cane [FreeTextEntry9] : chronic low back pain 2/2 sciatica

## 2019-04-25 ENCOUNTER — MESSAGE (OUTPATIENT)
Age: 59
End: 2019-04-25

## 2019-04-25 ENCOUNTER — APPOINTMENT (OUTPATIENT)
Dept: INFUSION THERAPY | Facility: HOSPITAL | Age: 59
End: 2019-04-25

## 2019-04-25 ENCOUNTER — APPOINTMENT (OUTPATIENT)
Dept: HEMATOLOGY ONCOLOGY | Facility: CLINIC | Age: 59
End: 2019-04-25

## 2019-04-25 LAB — CHROM ANALY INTERPHASE BLD FISH-IMP: SIGNIFICANT CHANGE UP

## 2019-04-26 ENCOUNTER — INBOUND DOCUMENT (OUTPATIENT)
Age: 59
End: 2019-04-26

## 2019-05-02 ENCOUNTER — OUTPATIENT (OUTPATIENT)
Dept: OUTPATIENT SERVICES | Facility: HOSPITAL | Age: 59
LOS: 1 days | End: 2019-05-02
Payer: MEDICARE

## 2019-05-02 DIAGNOSIS — D17.9 BENIGN LIPOMATOUS NEOPLASM, UNSPECIFIED: Chronic | ICD-10-CM

## 2019-05-02 DIAGNOSIS — Z94.84 STEM CELLS TRANSPLANT STATUS: ICD-10-CM

## 2019-05-02 DIAGNOSIS — Z41.9 ENCOUNTER FOR PROCEDURE FOR PURPOSES OTHER THAN REMEDYING HEALTH STATE, UNSPECIFIED: Chronic | ICD-10-CM

## 2019-05-02 DIAGNOSIS — Z98.89 OTHER SPECIFIED POSTPROCEDURAL STATES: Chronic | ICD-10-CM

## 2019-05-03 ENCOUNTER — APPOINTMENT (OUTPATIENT)
Dept: HEMATOLOGY ONCOLOGY | Facility: CLINIC | Age: 59
End: 2019-05-03

## 2019-05-03 ENCOUNTER — MESSAGE (OUTPATIENT)
Age: 59
End: 2019-05-03

## 2019-05-03 ENCOUNTER — APPOINTMENT (OUTPATIENT)
Dept: INFUSION THERAPY | Facility: HOSPITAL | Age: 59
End: 2019-05-03

## 2019-05-08 ENCOUNTER — MESSAGE (OUTPATIENT)
Age: 59
End: 2019-05-08

## 2019-05-08 ENCOUNTER — OUTPATIENT (OUTPATIENT)
Dept: OUTPATIENT SERVICES | Facility: HOSPITAL | Age: 59
LOS: 1 days | Discharge: HOME | End: 2019-05-08

## 2019-05-08 DIAGNOSIS — D17.9 BENIGN LIPOMATOUS NEOPLASM, UNSPECIFIED: Chronic | ICD-10-CM

## 2019-05-08 DIAGNOSIS — Z94.84 STEM CELLS TRANSPLANT STATUS: ICD-10-CM

## 2019-05-08 DIAGNOSIS — C91.00 ACUTE LYMPHOBLASTIC LEUKEMIA NOT HAVING ACHIEVED REMISSION: ICD-10-CM

## 2019-05-08 DIAGNOSIS — Z98.89 OTHER SPECIFIED POSTPROCEDURAL STATES: Chronic | ICD-10-CM

## 2019-05-08 DIAGNOSIS — Z41.9 ENCOUNTER FOR PROCEDURE FOR PURPOSES OTHER THAN REMEDYING HEALTH STATE, UNSPECIFIED: Chronic | ICD-10-CM

## 2019-05-10 ENCOUNTER — LABORATORY RESULT (OUTPATIENT)
Age: 59
End: 2019-05-10

## 2019-05-10 ENCOUNTER — RESULT REVIEW (OUTPATIENT)
Age: 59
End: 2019-05-10

## 2019-05-10 ENCOUNTER — APPOINTMENT (OUTPATIENT)
Dept: INFUSION THERAPY | Facility: HOSPITAL | Age: 59
End: 2019-05-10

## 2019-05-10 ENCOUNTER — APPOINTMENT (OUTPATIENT)
Dept: HEMATOLOGY ONCOLOGY | Facility: CLINIC | Age: 59
End: 2019-05-10
Payer: MEDICARE

## 2019-05-10 VITALS
TEMPERATURE: 98.2 F | RESPIRATION RATE: 16 BRPM | HEART RATE: 81 BPM | OXYGEN SATURATION: 99 % | SYSTOLIC BLOOD PRESSURE: 131 MMHG | BODY MASS INDEX: 40.21 KG/M2 | WEIGHT: 225.53 LBS | DIASTOLIC BLOOD PRESSURE: 88 MMHG

## 2019-05-10 DIAGNOSIS — R74.0 NONSPECIFIC ELEVATION OF LEVELS OF TRANSAMINASE AND LACTIC ACID DEHYDROGENASE [LDH]: ICD-10-CM

## 2019-05-10 LAB
BASOPHILS # BLD AUTO: 0 K/UL — SIGNIFICANT CHANGE UP (ref 0–0.2)
BASOPHILS NFR BLD AUTO: 0 % — SIGNIFICANT CHANGE UP (ref 0–2)
EOSINOPHIL # BLD AUTO: 0 K/UL — SIGNIFICANT CHANGE UP (ref 0–0.5)
EOSINOPHIL NFR BLD AUTO: 0.3 % — SIGNIFICANT CHANGE UP (ref 0–6)
HCT VFR BLD CALC: 37.7 % — SIGNIFICANT CHANGE UP (ref 34.5–45)
HGB BLD-MCNC: 13 G/DL — SIGNIFICANT CHANGE UP (ref 11.5–15.5)
LYMPHOCYTES # BLD AUTO: 1.2 K/UL — SIGNIFICANT CHANGE UP (ref 1–3.3)
LYMPHOCYTES # BLD AUTO: 13.9 % — SIGNIFICANT CHANGE UP (ref 13–44)
MCHC RBC-ENTMCNC: 34.4 G/DL — SIGNIFICANT CHANGE UP (ref 32–36)
MCHC RBC-ENTMCNC: 36.3 PG — HIGH (ref 27–34)
MCV RBC AUTO: 106 FL — HIGH (ref 80–100)
MONOCYTES # BLD AUTO: 0.3 K/UL — SIGNIFICANT CHANGE UP (ref 0–0.9)
MONOCYTES NFR BLD AUTO: 4 % — SIGNIFICANT CHANGE UP (ref 2–14)
NEUTROPHILS # BLD AUTO: 7 K/UL — SIGNIFICANT CHANGE UP (ref 1.8–7.4)
NEUTROPHILS NFR BLD AUTO: 81.8 % — HIGH (ref 43–77)
PLATELET # BLD AUTO: 93 K/UL — LOW (ref 150–400)
RBC # BLD: 3.57 M/UL — LOW (ref 3.8–5.2)
RBC # FLD: 16.8 % — HIGH (ref 10.3–14.5)
WBC # BLD: 8.5 K/UL — SIGNIFICANT CHANGE UP (ref 3.8–10.5)
WBC # FLD AUTO: 8.5 K/UL — SIGNIFICANT CHANGE UP (ref 3.8–10.5)

## 2019-05-10 PROCEDURE — 99215 OFFICE O/P EST HI 40 MIN: CPT

## 2019-05-10 RX ORDER — OXYCODONE 5 MG/1
5 TABLET ORAL
Qty: 120 | Refills: 0 | Status: DISCONTINUED | COMMUNITY
Start: 2019-03-04 | End: 2019-05-10

## 2019-05-13 LAB — CHROM ANALY INTERPHASE BLD FISH-IMP: SIGNIFICANT CHANGE UP

## 2019-05-14 PROBLEM — R74.0 TRANSAMINITIS: Status: ACTIVE | Noted: 2019-03-22

## 2019-05-14 NOTE — HISTORY OF PRESENT ILLNESS
[de-identified] : Ms. 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 Her induction course was complicated by tutu fever. She received cycle 2 mid may. BM BX prior was morphologically remission but pcr was positive. Cycle 4 was completed. Prolonged course with fever, pericardial effusion, possible fungal pna. I suspect fever and effusion due to sprycel. She also had neurologic / mental status changes after MTX. She has an omaya. She had two MTX IT. She feels well today.  s/p step 1 for stem cell mobilization..in CR1..S/P auto stem cell transplant with tam 200 mg/m2 prep...\par \par 10/4/18 - 11/20/18 : 58F hx HTN, Obesity, Ph(+) ALL s/p R Hypercavd x4 cycles IT MTX x2 s/p stem cell collection w/ Step 1(HD vp16 plus arac) stem cell mobilization regime. \par FISH and PCR negative. s/p Tam-Auto on 12/16/16. Patient prior to presentation had severe burning right abdominal pain with vesicular lesions and was diagnosed with shingles and received a 10 day course of antiviral medications. \par Patient on presentation had residual neuropathic pain.  Patient presented to the ER with severe occipital parietal headaches with nausea and vomiting. \par Patient also admitted to easy bruising mouth sores and dark stools. \par CT Head was done for complaints of headache which was (+) for SDH. Neurosurgery was consulted on initial detection of SDH, recommendations to keep platelets >80,000 was made.  LGIB was attributed to profound thrombocytopenia. 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 A PICC line was placed for chemotherapy and was initiated with Inotuzamab on Day 1, 8 and 15. IVF and Allopurinol for TLS. The patient has had refractory thrombocytopenia. H L A platelets were infused when they were available. when H L A platelets were not available 1/2 unit of platelet was infused over 3 hours. \par Follow up CT Head on 10/11 was stable and the patient continued to receive 1/2 units of platelets over 3 hours every 12 hours. The patient received last dose of Inotuzamab Cycle 1 on 10/22. On 10/15 patient was febrile,  a CT of the chest/abd/pelvis was done which showed scattered patchy ground glass opacities in right upper and lower lobes, suggestive of infection, patient received a course of IV antibiotics for Pneumonia. On 11/2 Blood cultures were found to be (+) for Co-agulase (-) Staph and patient received a course of Vancomycin. \par Repeat CT Head was completed on 10/23 for follow up and showed some new bleeding into previous collection. Findings were discussed with Neuro Surgery. \par HLA platelets were administered when available for refractory thrombocytopenia. \par Cycle 2 Inotuzumab was started on 11/6 which was given on Days 1, 8, 15. \par Patient tolerated second cycle without any adverse reactions. Patient for possible future Haploidentical Allogeneic stem cell transplant after discharge. \par Pre-BMT testing completed prior to discharge: Echo, MUGA, Xray sinuses, 24-Hr Urine for Creatinine. \par Patient had intermittent severe headaches on 11/3 and 11/15, repeat CTs showed nearly resolved hemorrhage. Headaches were managed with analgesics Fentanyl patch 37 mcg/hr and Oxycodone IR 15 mg q3 prn prior to discharge home. She is on ponatinib QOD b/c of increased LFT's.  \par \par Discharge summary for transplant (2/07-3/04/19):\par Ms. 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 Her transplant course was complicated by shingles. In October, 2018 she relapsed (confirmed via flow cytometry). She was treated with inotuzumab x 2 cycles. Her course was complicated by subdural hemorrhage, refractory thrombocytopenia requiring HLA matched platelets, pneumonia, and coag negative staph bacteremia (treated with vancomycin). She is now admitted for a haplo-identical peripheral blood stem cell transplant from her son. \par \par Upon admission, a TLC was placed in IR. Ms. Galeas received IV hydration, pain management, antiemetics, nutritional support, antiviral / antibacterial / antifungal / PCP / GI / VOD (SOS) prophylaxis. Labs were monitored on a daily basis, and she received electrolyte repletion and transfusional support as needed. \par \par Ms. Galeas had a relatively uncomplicated transplant course. A baseline CT of the head was done on admission given her history of SDH and refractory thrombocytopenia. The baseline CT was negative. Ms. Galeas did experience pancytopenia related to the high dose chemotherapy preparative regimen. Her thrombocytopenia was refractory, and was managed by infusing 1/2 unit of platelets twice daily over 3 hours. Ms. Galeas also experienced neutropenic fevers. When she became neutropenic, she was started on prophylactic ciprofloxacin. When she developed fevers, blood and urine cultures were sent, a CXR completed and the ciprofloxacin was changed to cefepime. Her cultures and CXR remained negative. \par \par On 2/13/19, Ms. Galeas received 280ml of fresh, mobilized, haplo-identical, HPC apheresis over approximately 1 hour. Cell counts as follows: \par Total MNCs (x 10^8/kg) = 4.36 \par CD34+ cells (x 10^6/kg) = 7.78 \par Cell Viability (%) = 100% \par \par Engraftment was noted on 2/27/19. Post engraftment, the cefepime and zarxio were discontinued. Tacrolimus levels and CMV PCR were monitored twice weekly. A FISH for Y was sent to determine chimerism, with results pending. \par \par \par  [de-identified] : On 5/10/19 visit, day +86 of SCT today. Overall feeling well with good PO intake/hydration. Stable dry cough with chest congestion. Intermittent abdominal pain is overall improved, using PRN Dilaudid once daily for this pain. Compliant with post transplant meds and restrictions. Incorrectly taking tacrolimus 3mg bid instead of 1.5mg bid as previously instructed. Currently taking prednisone 70mg daily instead of 80mg daily as previously instructed. Denies fever, chills, headache, dizziness, blurred vision, mucositis/odynophagia, chest pain, SOB, nausea/vomiting, diarrhea/constipation, dysuria, LE edema, rash, bleeding, pain

## 2019-05-14 NOTE — PHYSICAL EXAM
[Ambulatory and capable of all self care but unable to carry out any work activities] : Status 2- Ambulatory and capable of all self care but unable to carry out any work activities. Up and about more than 50% of waking hours [Obese] : obese [Normal] : RRR, normal S1S2, no murmurs, rubs, gallops [de-identified] : no edema

## 2019-05-14 NOTE — ASSESSMENT
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\par FISH for Y = 100% donor on 4/23/19\par Post transplant BMbx pending\par \par 2) Heme\par Counts stable, no indication for transfusion today\par    WBC 8.5   ANC 7   Hgb 13   PLT 93\par Continue multivitamin and folic acid\par \par 3) ID\par Continue ppx:\par - Fluconazole 200mg daily - renally adjusted\par - Mepron 750mg bid\par \par CMV viremia\par 3/22/19 CMV PCR = 4,889\par 4/23/19 CMV PCR = 87\par Valcyte 450mg bid started 3/29/19\par Continue to monitor PCR weekly\par \par Hx of C Diff infection\par Vanco d/c'd 5/10/19 with resolution of diarrhea\par \par 4) GVHD\par Skin 0   Liver 0   GI 0 - overall stage 0\par Off MMF\par Continue FK 3mg bid - pending today's level \par Prednisone decreased to 60mg daily as of 5/10/19, continue to taper weekly if clinically appropriate\par \par Skin GVHD\par Pruritic erythematous rash of lower half of face, neck, upper chest, and upper back (~23.5%) noted 3/15/19\par Resolved as of 3/22/19 with hydrocortisone cream alone\par \par 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 and questionable diarrhea\par Started on steroids with subsequent resolution of symptoms\par Continue prednisone dose as above\par \par 5) GI\par Continue ppx:\par - Protonix 40mg daily\par - Ursodiol 300mg bid\par - PRN Zofran and Reglan for nausea/vomiting\par \par Transaminitis\par Mild transaminitis noted since 3/15/19\par Continue to monitor \par \par 6) Other\par Subdural hemorrhage with headaches - continue ppx Keppra 500mg bid\par HTN - continue losartan 100mg daily and amlodipine 5mg daily, BP remains WNL\par Hypomagnesemia - likely 2/2 tacrolimus, continue MgOx 400mg tid\par Pain - chronic LBP 2/2 sciatica, PRN Dilaudid for back/abdominal pain \par \par 7) Plan/Dispo\par Pt educated regarding plan of care\par Compliance with correct dose of immunosuppression reviewed\par Instructed to contact our office if develops fever or new/worsening symptoms\par F/u in 1wk with Dr. Bentley on 5/16/19

## 2019-05-14 NOTE — REVIEW OF SYSTEMS
[Fatigue] : fatigue [Joint Pain] : joint pain [Negative] : Allergic/Immunologic [Eye Pain] : no eye pain [Fever] : no fever [Chills] : no chills [Vision Problems] : no vision problems [Dry Eyes] : no dryness of the eyes [Dysphagia] : no dysphagia [Odynophagia] : no odynophagia [Nosebleeds] : no nosebleeds [Mucosal Pain] : no mucosal pain [Lower Ext Edema] : no lower extremity edema [Chest Pain] : no chest pain [Shortness Of Breath] : no shortness of breath [Cough] : cough [Constipation] : no constipation [Abdominal Pain] : abdominal pain [Vomiting] : no vomiting [Diarrhea] : no diarrhea [Dysuria] : no dysuria [Muscle Weakness] : no muscle weakness [Skin Rash] : no skin rash [Dizziness] : no dizziness [Fainting] : no fainting [FreeTextEntry6] : dry cough with chest congestion  [FreeTextEntry9] : chronic low back pain 2/2 sciatica [FreeTextEntry7] : no nausea, stable intermittent abdominal pain

## 2019-05-16 ENCOUNTER — LABORATORY RESULT (OUTPATIENT)
Age: 59
End: 2019-05-16

## 2019-05-16 ENCOUNTER — APPOINTMENT (OUTPATIENT)
Dept: HEMATOLOGY ONCOLOGY | Facility: CLINIC | Age: 59
End: 2019-05-16
Payer: MEDICARE

## 2019-05-16 ENCOUNTER — APPOINTMENT (OUTPATIENT)
Dept: INFUSION THERAPY | Facility: HOSPITAL | Age: 59
End: 2019-05-16

## 2019-05-16 ENCOUNTER — RESULT REVIEW (OUTPATIENT)
Age: 59
End: 2019-05-16

## 2019-05-16 VITALS
BODY MASS INDEX: 40.09 KG/M2 | DIASTOLIC BLOOD PRESSURE: 94 MMHG | HEART RATE: 90 BPM | SYSTOLIC BLOOD PRESSURE: 135 MMHG | OXYGEN SATURATION: 97 % | TEMPERATURE: 98 F | WEIGHT: 224.87 LBS | RESPIRATION RATE: 15 BRPM

## 2019-05-16 DIAGNOSIS — Z86.69 PERSONAL HISTORY OF OTHER DISEASES OF THE NERVOUS SYSTEM AND SENSE ORGANS: ICD-10-CM

## 2019-05-16 LAB
BASOPHILS # BLD AUTO: 0 K/UL — SIGNIFICANT CHANGE UP (ref 0–0.2)
BASOPHILS NFR BLD AUTO: 0.1 % — SIGNIFICANT CHANGE UP (ref 0–2)
EOSINOPHIL # BLD AUTO: 0 K/UL — SIGNIFICANT CHANGE UP (ref 0–0.5)
EOSINOPHIL NFR BLD AUTO: 0.3 % — SIGNIFICANT CHANGE UP (ref 0–6)
HCT VFR BLD CALC: 37.9 % — SIGNIFICANT CHANGE UP (ref 34.5–45)
HGB BLD-MCNC: 12.9 G/DL — SIGNIFICANT CHANGE UP (ref 11.5–15.5)
LYMPHOCYTES # BLD AUTO: 1.6 K/UL — SIGNIFICANT CHANGE UP (ref 1–3.3)
LYMPHOCYTES # BLD AUTO: 14.8 % — SIGNIFICANT CHANGE UP (ref 13–44)
MCHC RBC-ENTMCNC: 33.9 G/DL — SIGNIFICANT CHANGE UP (ref 32–36)
MCHC RBC-ENTMCNC: 35.8 PG — HIGH (ref 27–34)
MCV RBC AUTO: 106 FL — HIGH (ref 80–100)
MONOCYTES # BLD AUTO: 0.3 K/UL — SIGNIFICANT CHANGE UP (ref 0–0.9)
MONOCYTES NFR BLD AUTO: 3 % — SIGNIFICANT CHANGE UP (ref 2–14)
NEUTROPHILS # BLD AUTO: 8.7 K/UL — HIGH (ref 1.8–7.4)
NEUTROPHILS NFR BLD AUTO: 81.9 % — HIGH (ref 43–77)
PLATELET # BLD AUTO: 70 K/UL — LOW (ref 150–400)
RBC # BLD: 3.59 M/UL — LOW (ref 3.8–5.2)
RBC # FLD: 16.2 % — HIGH (ref 10.3–14.5)
WBC # BLD: 10.6 K/UL — HIGH (ref 3.8–10.5)
WBC # FLD AUTO: 10.6 K/UL — HIGH (ref 3.8–10.5)

## 2019-05-16 PROCEDURE — 99215 OFFICE O/P EST HI 40 MIN: CPT

## 2019-05-16 NOTE — ASSESSMENT
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  Recent BM BX was c/w remission. Fish and pcr were negative. Step 1 complicated by hospitalization for neutropenic fevers and c-diff diarrhea 11/1-11/11. \par Was able to successfully collect her stem cell while inpt on 11/8 and 11/9. \par Now s/p Masha-Auto on 12/16/16. Hospital course complicated by pancytopenia and fungal PNA. 12/22 CT chest revealed new 1cm right lung nodule and was started on voriconazole. Also +RVP for rhinovirus/enterovirus. Demonstrated engraftment on 12/26 and d/c'ed in stable condition on 1/3/17.\par \par Now relapsed ph pos ALL......s/p re induction with inotuzimab...on Ponatinib to deepen response.....f/u fish and pcr\par Peripheral blood work stable and discussed with patient. PCR remains positive on pb...hx of subdural...no HA. \par Continued  Ponatinib 45 mg QOD  ...achieved CR.... discussed rationale, risk, benefits and side effects of therapy. Patient verbalized understanding and expressed agreement with treatment plan. 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 Engraftment was noted on 2/27/19. Course complicated by cdiff...100% donor\par \par Was hospitalized in march for upper GI gvhd...stage 1 overall grade 2...no gvhd noted today\par tacrolimus 1 mg oral capsule -- 1 cap(s) by mouth every 12 hours -- Indication: For immune suppression\par prednisone- decrease to 50 mg QD as of 5/16/19. \par acyclovir- discontinued and switched to Valcyte 450mg bid on 3/29/19- will plan to reduce to QD soon.\par oxyCODONE 5 mg oral tablet -- 1 tab(s) by mouth every 6 hours, As Needed MDD:4 tabs -- Indication: For As needed for pain \par Cozaar 100 mg oral tablet -- 1 tab(s) by mouth once a day -- Indication: For HTN \par levETIRAcetam 500 mg oral tablet -- 1 tab(s) by mouth 2 times a day -- Indication: For Anti-seizure medication \par ondansetron 8 mg oral tablet -- 1 tab(s) by mouth 3 times a day, As Needed -- Indication: For As needed for nausea \par metoclopramide 10 mg oral tablet -- 1 tab(s) by mouth 4 times a day (before meals and at bedtime), As Needed -- Indication: For As needed for nausea \par fluconazole 200 mg oral tablet -- decreased to 1 tab(s) by mouth once a day on 4/24/19 -- Indication: For Antifungal prophylaxis  \par amLODIPine 5 mg oral tablet -- 1 tab(s) by mouth once a day -- Indication: For HTN \par Actigall 300 mg oral capsule -- 1 cap(s) by mouth 2 times a day -- Indication: For VOD (SOS) prophylaxis \par vancomycin- d/c'd 5/10/19 with resolution of diarrhea\par mycophenolate mofetil- discontinued while inpatient \par magnesium oxide 400 mg (241.3 mg elemental magnesium) oral tablet -- 1 tab(s) by mouth 3 times a day (with meals) -- Indication: For Supplement \par atovaquone 750 mg/5 mL oral suspension -- 5 milliliter(s) by mouth 2 times a day -- Indication: For PCP prophylaxis \par pantoprazole 40 mg oral delayed release tablet -- 1 tab(s) by mouth once a day (before a meal) -- Indication: For GI prophylaxis \par Multiple Vitamins oral tablet -- 1 tab(s) by mouth once a day -- Indication: For Supplement \par folic acid 1 mg oral tablet -- 1 tab(s) by mouth once a day -- Indication: For Supplement. \par \par Continue medications as instructed. \par Peripheral blood work reviewed and discussed with patient.\par Labs sent today for CMP, LDH, Mg, Tacrolimus level, CMV - PCR. \par FK level pending, will contact pt to change dose if needed.\par Maintain post-transplant diet and crowd restrictions. \par Patient advised to monitor for signs and symptoms of GVHD including but not limited to fever over 101, rash, nausea, vomiting, severe diarrhea, eye pain/dryness. - advised to contact immediately with any developing or worsening signs/symptoms. \par Well care stressed, questions addressed, support provided. \par \par Maintain weekly appointments at this time - pre-scheduled. Patient will speak with Veronica to discuss Medicare issues.

## 2019-05-16 NOTE — ADDENDUM
[FreeTextEntry1] : Documented by Karely Gayle acting as a scribe for Dr. Luke Bentley on 05/16/2019.\par \par All medical record entries made by the Scribe were at my, Dr. Luke Bentley's, direction and personally dictated by me on 05/16/2019. I have reviewed the chart and agree that  the record accurately reflects my personal performance of the history, physical exam, assessment and plan. I have also personally directed, reviewed, and agree with the discharge instructions.\par \par

## 2019-05-16 NOTE — PHYSICAL EXAM
[Ambulatory and capable of all self care but unable to carry out any work activities] : Status 2- Ambulatory and capable of all self care but unable to carry out any work activities. Up and about more than 50% of waking hours [Normal] : affect appropriate [de-identified] : stable gait

## 2019-05-16 NOTE — HISTORY OF PRESENT ILLNESS
[de-identified] : Ms. 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 Her induction course was complicated by tutu fever. She received cycle 2 mid may. BM BX prior was morphologically remission but pcr was positive. Cycle 4 was completed. Prolonged course with fever, pericardial effusion, possible fungal pna. I suspect fever and effusion due to sprycel. She also had neurologic / mental status changes after MTX. She has an omaya. She had two MTX IT. She feels well today.  s/p step 1 for stem cell mobilization..in CR1..S/P auto stem cell transplant with tam 200 mg/m2 prep...\par \par 10/4/18 - 11/20/18 : 58F hx HTN, Obesity, Ph(+) ALL s/p R Hypercavd x4 cycles IT MTX x2 s/p stem cell collection w/ Step 1(HD vp16 plus arac) stem cell mobilization regime. \par FISH and PCR negative. s/p Tam-Auto on 12/16/16. Patient prior to presentation had severe burning right abdominal pain with vesicular lesions and was diagnosed with shingles and received a 10 day course of antiviral medications. \par Patient on presentation had residual neuropathic pain.  Patient presented to the ER with severe occipital parietal headaches with nausea and vomiting. \par Patient also admitted to easy bruising mouth sores and dark stools. \par CT Head was done for complaints of headache which was (+) for SDH. Neurosurgery was consulted on initial detection of SDH, recommendations to keep platelets >80,000 was made.  LGIB was attributed to profound thrombocytopenia. 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 A PICC line was placed for chemotherapy and was initiated with Inotuzamab on Day 1, 8 and 15. IVF and Allopurinol for TLS. The patient has had refractory thrombocytopenia. H L A platelets were infused when they were available. when H L A platelets were not available 1/2 unit of platelet was infused over 3 hours. \par Follow up CT Head on 10/11 was stable and the patient continued to receive 1/2 units of platelets over 3 hours every 12 hours. The patient received last dose of Inotuzamab Cycle 1 on 10/22. On 10/15 patient was febrile,  a CT of the chest/abd/pelvis was done which showed scattered patchy ground glass opacities in right upper and lower lobes, suggestive of infection, patient received a course of IV antibiotics for Pneumonia. On 11/2 Blood cultures were found to be (+) for Co-agulase (-) Staph and patient received a course of Vancomycin. \par Repeat CT Head was completed on 10/23 for follow up and showed some new bleeding into previous collection. Findings were discussed with Neuro Surgery. \par HLA platelets were administered when available for refractory thrombocytopenia. \par Cycle 2 Inotuzumab was started on 11/6 which was given on Days 1, 8, 15. \par Patient tolerated second cycle without any adverse reactions. Patient for possible future Haploidentical Allogeneic stem cell transplant after discharge. \par Pre-BMT testing completed prior to discharge: Echo, MUGA, Xray sinuses, 24-Hr Urine for Creatinine. \par Patient had intermittent severe headaches on 11/3 and 11/15, repeat CTs showed nearly resolved hemorrhage. Headaches were managed with analgesics Fentanyl patch 37 mcg/hr and Oxycodone IR 15 mg q3 prn prior to discharge home. She is on ponatinib QOD b/c of increased LFT's.  \par \par Patient underwent a haploidentical PBSCT on 2/13/19 (son), hospital course as follows:\par Ms. 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 Her transplant course was complicated by shingles. In October, 2018 she relapsed (confirmed via flow cytometry). She was treated with inotuzumab x 2 cycles. Her course was complicated by subdural hemorrhage, refractory thrombocytopenia requiring HLA matched platelets, pneumonia, and coag negative staph bacteremia (treated with vancomycin). She is now admitted for a haplo-identical peripheral blood stem cell transplant from her son. \par \par Upon admission, a TLC was placed in IR. Ms. Galeas received IV hydration, pain management, antiemetics, nutritional support, antiviral / antibacterial / antifungal / PCP / GI / VOD (SOS) prophylaxis. Labs were monitored on a daily basis, and she received electrolyte repletion and transfusional support as needed. \par \par Ms. Galeas had a relatively uncomplicated transplant course. A baseline CT of the head was done on admission given her history of SDH and refractory thrombocytopenia. The baseline CT was negative. Ms. Galeas did experience pancytopenia related to the high dose chemotherapy preparative regimen. Her thrombocytopenia was refractory, and was managed by infusing 1/2 unit of platelets twice daily over 3 hours. Ms. Galeas also experienced neutropenic fevers. When she became neutropenic, she was started on prophylactic ciprofloxacin. When she developed fevers, blood and urine cultures were sent, a CXR completed and the ciprofloxacin was changed to cefepime. Her cultures and CXR remained negative. \par \par On 2/13/19, Ms. Galeas received 280ml of fresh, mobilized, haplo-identical, HPC apheresis over approximately 1 hour. Cell counts as follows: \par Total MNCs (x 10^8/kg) = 4.36 \par CD34+ cells (x 10^6/kg) = 7.78 \par Cell Viability (%) = 100% \par \par Engraftment was noted on 2/27/19. Post engraftment, the cefepime and zarxio were discontinued. Tacrolimus levels and CMV PCR were monitored twice weekly. A FISH for Y was sent to determine chimerism, with results pending. 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 She feels well overall, but c/o stable dry cough. Denies fever, mouth sores, eye dryness, blurred vision. No CP, SOB or LE edema. She is taking Tacrolimus 1 mg BID, which she did not take today, and Prednisone 60 mg QD. She maintains proper picc maintenance, flushing daily. Today she c/o issues with medicaid coverage.

## 2019-05-16 NOTE — REASON FOR VISIT
[Acute Lymphoblastic Leukemia] : acute lymphoblastic leukemia [Follow-Up Visit] : a follow-up visit for [Family Member] : family member [FreeTextEntry2] :  s/p Masha-Auto on 12/16/16

## 2019-05-16 NOTE — REVIEW OF SYSTEMS
[Fatigue] : fatigue [Cough] : cough [Negative] : Neurological [Muscle Weakness] : no muscle weakness [FreeTextEntry9] :  occ back pain

## 2019-05-20 ENCOUNTER — APPOINTMENT (OUTPATIENT)
Dept: INFUSION THERAPY | Facility: HOSPITAL | Age: 59
End: 2019-05-20

## 2019-05-20 ENCOUNTER — APPOINTMENT (OUTPATIENT)
Dept: HEMATOLOGY ONCOLOGY | Facility: CLINIC | Age: 59
End: 2019-05-20

## 2019-05-20 LAB — CHROM ANALY INTERPHASE BLD FISH-IMP: SIGNIFICANT CHANGE UP

## 2019-05-22 ENCOUNTER — FORM ENCOUNTER (OUTPATIENT)
Age: 59
End: 2019-05-22

## 2019-05-23 ENCOUNTER — LABORATORY RESULT (OUTPATIENT)
Age: 59
End: 2019-05-23

## 2019-05-23 ENCOUNTER — OUTPATIENT (OUTPATIENT)
Dept: OUTPATIENT SERVICES | Facility: HOSPITAL | Age: 59
LOS: 1 days | End: 2019-05-23
Payer: MEDICARE

## 2019-05-23 ENCOUNTER — RESULT REVIEW (OUTPATIENT)
Age: 59
End: 2019-05-23

## 2019-05-23 ENCOUNTER — APPOINTMENT (OUTPATIENT)
Dept: RADIOLOGY | Facility: IMAGING CENTER | Age: 59
End: 2019-05-23
Payer: MEDICARE

## 2019-05-23 ENCOUNTER — APPOINTMENT (OUTPATIENT)
Dept: INFUSION THERAPY | Facility: HOSPITAL | Age: 59
End: 2019-05-23

## 2019-05-23 ENCOUNTER — APPOINTMENT (OUTPATIENT)
Dept: HEMATOLOGY ONCOLOGY | Facility: CLINIC | Age: 59
End: 2019-05-23
Payer: MEDICARE

## 2019-05-23 VITALS
TEMPERATURE: 99.1 F | HEART RATE: 86 BPM | BODY MASS INDEX: 39.5 KG/M2 | DIASTOLIC BLOOD PRESSURE: 91 MMHG | OXYGEN SATURATION: 95 % | RESPIRATION RATE: 14 BRPM | SYSTOLIC BLOOD PRESSURE: 138 MMHG | WEIGHT: 221.56 LBS

## 2019-05-23 DIAGNOSIS — D17.9 BENIGN LIPOMATOUS NEOPLASM, UNSPECIFIED: Chronic | ICD-10-CM

## 2019-05-23 DIAGNOSIS — R05 COUGH: ICD-10-CM

## 2019-05-23 DIAGNOSIS — D69.6 THROMBOCYTOPENIA, UNSPECIFIED: ICD-10-CM

## 2019-05-23 DIAGNOSIS — Z41.9 ENCOUNTER FOR PROCEDURE FOR PURPOSES OTHER THAN REMEDYING HEALTH STATE, UNSPECIFIED: Chronic | ICD-10-CM

## 2019-05-23 DIAGNOSIS — Z98.89 OTHER SPECIFIED POSTPROCEDURAL STATES: Chronic | ICD-10-CM

## 2019-05-23 LAB
BASOPHILS # BLD AUTO: 0 K/UL — SIGNIFICANT CHANGE UP (ref 0–0.2)
BASOPHILS NFR BLD AUTO: 0.2 % — SIGNIFICANT CHANGE UP (ref 0–2)
EOSINOPHIL # BLD AUTO: 0 K/UL — SIGNIFICANT CHANGE UP (ref 0–0.5)
EOSINOPHIL NFR BLD AUTO: 0.3 % — SIGNIFICANT CHANGE UP (ref 0–6)
HCT VFR BLD CALC: 38.4 % — SIGNIFICANT CHANGE UP (ref 34.5–45)
HGB BLD-MCNC: 13.1 G/DL — SIGNIFICANT CHANGE UP (ref 11.5–15.5)
LYMPHOCYTES # BLD AUTO: 1.7 K/UL — SIGNIFICANT CHANGE UP (ref 1–3.3)
LYMPHOCYTES # BLD AUTO: 18.2 % — SIGNIFICANT CHANGE UP (ref 13–44)
MCHC RBC-ENTMCNC: 34.1 G/DL — SIGNIFICANT CHANGE UP (ref 32–36)
MCHC RBC-ENTMCNC: 34.8 PG — HIGH (ref 27–34)
MCV RBC AUTO: 102 FL — HIGH (ref 80–100)
MONOCYTES # BLD AUTO: 0.2 K/UL — SIGNIFICANT CHANGE UP (ref 0–0.9)
MONOCYTES NFR BLD AUTO: 2.3 % — SIGNIFICANT CHANGE UP (ref 2–14)
NEUTROPHILS # BLD AUTO: 7.4 K/UL — SIGNIFICANT CHANGE UP (ref 1.8–7.4)
NEUTROPHILS NFR BLD AUTO: 79 % — HIGH (ref 43–77)
PLATELET # BLD AUTO: 61 K/UL — LOW (ref 150–400)
RBC # BLD: 3.76 M/UL — LOW (ref 3.8–5.2)
RBC # FLD: 15.4 % — HIGH (ref 10.3–14.5)
WBC # BLD: 9.3 K/UL — SIGNIFICANT CHANGE UP (ref 3.8–10.5)
WBC # FLD AUTO: 9.3 K/UL — SIGNIFICANT CHANGE UP (ref 3.8–10.5)

## 2019-05-23 PROCEDURE — 99214 OFFICE O/P EST MOD 30 MIN: CPT

## 2019-05-23 PROCEDURE — 71046 X-RAY EXAM CHEST 2 VIEWS: CPT

## 2019-05-23 PROCEDURE — G0452: CPT | Mod: 26

## 2019-05-23 PROCEDURE — 71046 X-RAY EXAM CHEST 2 VIEWS: CPT | Mod: 26

## 2019-05-23 PROCEDURE — 88271 CYTOGENETICS DNA PROBE: CPT

## 2019-05-23 PROCEDURE — 88237 TISSUE CULTURE BONE MARROW: CPT

## 2019-05-23 PROCEDURE — 88275 CYTOGENETICS 100-300: CPT

## 2019-05-23 RX ORDER — VANCOMYCIN HYDROCHLORIDE 125 MG/1
125 CAPSULE ORAL
Qty: 1 | Refills: 0 | Status: DISCONTINUED | COMMUNITY
Start: 2019-03-04 | End: 2019-05-23

## 2019-05-24 PROBLEM — D69.6 THROMBOCYTOPENIA: Status: ACTIVE | Noted: 2019-03-15

## 2019-05-24 PROBLEM — R05 COUGH: Status: ACTIVE | Noted: 2018-06-15

## 2019-05-24 NOTE — HISTORY OF PRESENT ILLNESS
[de-identified] : Ms. 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 Her induction course was complicated by tutu fever. She received cycle 2 mid may. BM BX prior was morphologically remission but pcr was positive. Cycle 4 was completed. Prolonged course with fever, pericardial effusion, possible fungal pna. I suspect fever and effusion due to sprycel. She also had neurologic / mental status changes after MTX. She has an omaya. She had two MTX IT. She feels well today.  s/p step 1 for stem cell mobilization..in CR1..S/P auto stem cell transplant with tam 200 mg/m2 prep...\par \par 10/4/18 - 11/20/18 : 58F hx HTN, Obesity, Ph(+) ALL s/p R Hypercavd x4 cycles IT MTX x2 s/p stem cell collection w/ Step 1(HD vp16 plus arac) stem cell mobilization regime. \par FISH and PCR negative. s/p Tam-Auto on 12/16/16. Patient prior to presentation had severe burning right abdominal pain with vesicular lesions and was diagnosed with shingles and received a 10 day course of antiviral medications. \par Patient on presentation had residual neuropathic pain.  Patient presented to the ER with severe occipital parietal headaches with nausea and vomiting. \par Patient also admitted to easy bruising mouth sores and dark stools. \par CT Head was done for complaints of headache which was (+) for SDH. Neurosurgery was consulted on initial detection of SDH, recommendations to keep platelets >80,000 was made.  LGIB was attributed to profound thrombocytopenia. 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 A PICC line was placed for chemotherapy and was initiated with Inotuzamab on Day 1, 8 and 15. IVF and Allopurinol for TLS. The patient has had refractory thrombocytopenia. H L A platelets were infused when they were available. when H L A platelets were not available 1/2 unit of platelet was infused over 3 hours. \par Follow up CT Head on 10/11 was stable and the patient continued to receive 1/2 units of platelets over 3 hours every 12 hours. The patient received last dose of Inotuzamab Cycle 1 on 10/22. On 10/15 patient was febrile,  a CT of the chest/abd/pelvis was done which showed scattered patchy ground glass opacities in right upper and lower lobes, suggestive of infection, patient received a course of IV antibiotics for Pneumonia. On 11/2 Blood cultures were found to be (+) for Co-agulase (-) Staph and patient received a course of Vancomycin. \par Repeat CT Head was completed on 10/23 for follow up and showed some new bleeding into previous collection. Findings were discussed with Neuro Surgery. \par HLA platelets were administered when available for refractory thrombocytopenia. \par Cycle 2 Inotuzumab was started on 11/6 which was given on Days 1, 8, 15. \par Patient tolerated second cycle without any adverse reactions. Patient for possible future Haploidentical Allogeneic stem cell transplant after discharge. \par Pre-BMT testing completed prior to discharge: Echo, MUGA, Xray sinuses, 24-Hr Urine for Creatinine. \par Patient had intermittent severe headaches on 11/3 and 11/15, repeat CTs showed nearly resolved hemorrhage. Headaches were managed with analgesics Fentanyl patch 37 mcg/hr and Oxycodone IR 15 mg q3 prn prior to discharge home. She is on ponatinib QOD b/c of increased LFT's.  \par \par Discharge summary for transplant (2/07-3/04/19):\par Ms. 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 Her transplant course was complicated by shingles. In October, 2018 she relapsed (confirmed via flow cytometry). She was treated with inotuzumab x 2 cycles. Her course was complicated by subdural hemorrhage, refractory thrombocytopenia requiring HLA matched platelets, pneumonia, and coag negative staph bacteremia (treated with vancomycin). She is now admitted for a haplo-identical peripheral blood stem cell transplant from her son. \par \par Upon admission, a TLC was placed in IR. Ms. Galeas received IV hydration, pain management, antiemetics, nutritional support, antiviral / antibacterial / antifungal / PCP / GI / VOD (SOS) prophylaxis. Labs were monitored on a daily basis, and she received electrolyte repletion and transfusional support as needed. \par \par Ms. Galeas had a relatively uncomplicated transplant course. A baseline CT of the head was done on admission given her history of SDH and refractory thrombocytopenia. The baseline CT was negative. Ms. Galeas did experience pancytopenia related to the high dose chemotherapy preparative regimen. Her thrombocytopenia was refractory, and was managed by infusing 1/2 unit of platelets twice daily over 3 hours. Ms. Galeas also experienced neutropenic fevers. When she became neutropenic, she was started on prophylactic ciprofloxacin. When she developed fevers, blood and urine cultures were sent, a CXR completed and the ciprofloxacin was changed to cefepime. Her cultures and CXR remained negative. \par \par On 2/13/19, Ms. Galeas received 280ml of fresh, mobilized, haplo-identical, HPC apheresis over approximately 1 hour. Cell counts as follows: \par Total MNCs (x 10^8/kg) = 4.36 \par CD34+ cells (x 10^6/kg) = 7.78 \par Cell Viability (%) = 100% \par \par Engraftment was noted on 2/27/19. Post engraftment, the cefepime and zarxio were discontinued. Tacrolimus levels and CMV PCR were monitored twice weekly. A FISH for Y was sent to determine chimerism, with results pending. \par \par \par  [de-identified] : On 5/23/19 visit, day +99 of SCT today. Feeling well. Tolerating adequate PO intake and hydration. Main complaint is ongoing cough for more than 1mo with sensation of chest congestion but unable to clear sputum. Chronic back pain is manageable with PRN Dilaudid daily. Compliant with post transplant meds and restrictions. Currently taking FK 1mg bid which she did not take prior to lab draw today. Also taking prednisone 50mg daily. Denies fever, chills, headache, dizziness, blurred vision, mucositis/odynophagia, chest pain, SOB, nausea/vomiting, diarrhea/constipation, abdominal pain, dysuria, LE edema, rash, bleeding

## 2019-05-24 NOTE — PHYSICAL EXAM
[Ambulatory and capable of all self care but unable to carry out any work activities] : Status 2- Ambulatory and capable of all self care but unable to carry out any work activities. Up and about more than 50% of waking hours [Obese] : obese [Normal] : affect appropriate [de-identified] : RLL rales and inspiratory wheezes [de-identified] : no edema

## 2019-05-24 NOTE — REVIEW OF SYSTEMS
[Fever] : no fever [Chills] : no chills [Fatigue] : fatigue [Eye Pain] : no eye pain [Dry Eyes] : no dryness of the eyes [Vision Problems] : no vision problems [Dysphagia] : no dysphagia [Nosebleeds] : no nosebleeds [Odynophagia] : no odynophagia [Mucosal Pain] : no mucosal pain [Chest Pain] : no chest pain [Lower Ext Edema] : no lower extremity edema [Shortness Of Breath] : no shortness of breath [Cough] : cough [Abdominal Pain] : no abdominal pain [Vomiting] : no vomiting [Constipation] : no constipation [Diarrhea] : no diarrhea [Dysuria] : no dysuria [Joint Pain] : joint pain [Muscle Weakness] : no muscle weakness [Skin Rash] : no skin rash [Dizziness] : no dizziness [Fainting] : no fainting [Negative] : Allergic/Immunologic [FreeTextEntry6] : dry cough with chest congestion  [FreeTextEntry7] : no nausea [FreeTextEntry9] : chronic low back pain 2/2 sciatica

## 2019-05-24 NOTE — ASSESSMENT
[FreeTextEntry1] : Kellen Galeas is a 60y/o female with a history of Ph positive ALL with the following comorbidities being managed:\par \par 1) Ph+ ALL\par S/p haplo son PBSCT on 2/13/19\par FISH for Y = 100% donor on 5/16/19\par Post transplant BMbx pending\par \par 2) Heme\par Ongoing thrombocytopenia, no indication for transfusion today\par    WBC 9.3   ANC 7.4   Hgb 13.1   PLT 61\par Continue multivitamin and folic acid\par \par 3) ID\par Continue ppx:\par - Fluconazole 200mg daily - renally adjusted\par - Mepron 750mg bid\par \par CMV viremia\par 3/22/19 CMV PCR = 4,889\par 5/16/19 CMV PCR = 6,219\par 5/23/19 CMV PCR = 11,485\par Valcyte 450mg bid started 3/29/19\par On 5/24/19, discovered pt has been taking acyclovir instead of Valcyte since discharge from hospitalization in April which would explain rising CMV PCR. Pt to  and start Valcyte 450mg bid 5/24/19\par Continue to monitor PCR weekly\par \par Hx of C Diff infection\par Vanco d/c'd 5/10/19 with resolution of diarrhea\par \par Cough and chest congestions\par 5/23/19 CXR negative\par Recommended supportive care with OTC Mucinex \par \par 4) GVHD\par Skin 0   Liver 0   GI 0 - overall stage 0\par Off MMF\par Continue FK 1mg bid - pending today's level \par Prednisone decreased to 40mg daily as of 5/23/19, continue to taper weekly if clinically appropriate\par \par Skin GVHD\par Pruritic erythematous rash of lower half of face, neck, upper chest, and upper back (~23.5%) noted 3/15/19\par Resolved as of 3/22/19 with hydrocortisone cream alone\par \par 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 and questionable diarrhea\par Started on steroids with subsequent resolution of symptoms\par Continue prednisone dose as above\par \par 5) GI\par Continue ppx:\par - Protonix 40mg daily\par - Ursodiol 300mg bid\par - PRN Zofran and Reglan for nausea/vomiting\par \par Transaminitis\par Mild transaminitis noted since 3/15/19, resolved as of 5/10/19\par Continue to monitor \par \par 6) Other\par Subdural hemorrhage with headaches - continue ppx Keppra 500mg bid\par HTN - continue losartan 100mg daily and amlodipine 5mg daily, BP remains WNL\par Hypomagnesemia - likely 2/2 tacrolimus, continue MgOx 400mg tid\par Pain - chronic LBP 2/2 sciatica, PRN Dilaudid for back pain \par \par 7) Plan/Dispo\par Pt educated regarding plan of care, all questions/concerns addressed\par Instructed to contact our office if develops fever or new/worsening symptoms\par F/u in 1wk with Dr. Bentley on 5/29/19

## 2019-05-28 LAB — CHROM ANALY INTERPHASE BLD FISH-IMP: SIGNIFICANT CHANGE UP

## 2019-05-29 ENCOUNTER — LABORATORY RESULT (OUTPATIENT)
Age: 59
End: 2019-05-29

## 2019-05-29 ENCOUNTER — RESULT REVIEW (OUTPATIENT)
Age: 59
End: 2019-05-29

## 2019-05-29 ENCOUNTER — APPOINTMENT (OUTPATIENT)
Dept: INFUSION THERAPY | Facility: HOSPITAL | Age: 59
End: 2019-05-29

## 2019-05-29 ENCOUNTER — APPOINTMENT (OUTPATIENT)
Dept: HEMATOLOGY ONCOLOGY | Facility: CLINIC | Age: 59
End: 2019-05-29
Payer: MEDICARE

## 2019-05-29 VITALS
DIASTOLIC BLOOD PRESSURE: 93 MMHG | BODY MASS INDEX: 39.5 KG/M2 | OXYGEN SATURATION: 97 % | TEMPERATURE: 98.6 F | WEIGHT: 221.56 LBS | RESPIRATION RATE: 16 BRPM | SYSTOLIC BLOOD PRESSURE: 140 MMHG | HEART RATE: 88 BPM

## 2019-05-29 DIAGNOSIS — B25.9 CYTOMEGALOVIRAL DISEASE, UNSPECIFIED: ICD-10-CM

## 2019-05-29 LAB
BASOPHILS # BLD AUTO: 0 K/UL — SIGNIFICANT CHANGE UP (ref 0–0.2)
BASOPHILS NFR BLD AUTO: 0.2 % — SIGNIFICANT CHANGE UP (ref 0–2)
BCR/ABL BY RT - PCR QUANTITATIVE: SIGNIFICANT CHANGE UP
EOSINOPHIL # BLD AUTO: 0 K/UL — SIGNIFICANT CHANGE UP (ref 0–0.5)
EOSINOPHIL NFR BLD AUTO: 0.2 % — SIGNIFICANT CHANGE UP (ref 0–6)
HCT VFR BLD CALC: 38.2 % — SIGNIFICANT CHANGE UP (ref 34.5–45)
HGB BLD-MCNC: 13 G/DL — SIGNIFICANT CHANGE UP (ref 11.5–15.5)
LYMPHOCYTES # BLD AUTO: 1.9 K/UL — SIGNIFICANT CHANGE UP (ref 1–3.3)
LYMPHOCYTES # BLD AUTO: 19.8 % — SIGNIFICANT CHANGE UP (ref 13–44)
MCHC RBC-ENTMCNC: 33.9 G/DL — SIGNIFICANT CHANGE UP (ref 32–36)
MCHC RBC-ENTMCNC: 34.4 PG — HIGH (ref 27–34)
MCV RBC AUTO: 101 FL — HIGH (ref 80–100)
MONOCYTES # BLD AUTO: 0.2 K/UL — SIGNIFICANT CHANGE UP (ref 0–0.9)
MONOCYTES NFR BLD AUTO: 2.5 % — SIGNIFICANT CHANGE UP (ref 2–14)
NEUTROPHILS # BLD AUTO: 7.6 K/UL — HIGH (ref 1.8–7.4)
NEUTROPHILS NFR BLD AUTO: 77.3 % — HIGH (ref 43–77)
PLATELET # BLD AUTO: 66 K/UL — LOW (ref 150–400)
RBC # BLD: 3.77 M/UL — LOW (ref 3.8–5.2)
RBC # FLD: 15 % — HIGH (ref 10.3–14.5)
WBC # BLD: 9.8 K/UL — SIGNIFICANT CHANGE UP (ref 3.8–10.5)
WBC # FLD AUTO: 9.8 K/UL — SIGNIFICANT CHANGE UP (ref 3.8–10.5)

## 2019-05-29 PROCEDURE — 99215 OFFICE O/P EST HI 40 MIN: CPT

## 2019-05-29 NOTE — REVIEW OF SYSTEMS
[Fatigue] : fatigue [Cough] : cough [Negative] : Allergic/Immunologic [Diarrhea] : diarrhea [Muscle Weakness] : no muscle weakness [FreeTextEntry9] :  occ back pain [FreeTextEntry7] : occ

## 2019-05-29 NOTE — ADDENDUM
[FreeTextEntry1] : Documented by Karely Gayle acting as a scribe for Dr. Luke Bentley on 05/29/2019.\par \par All medical record entries made by the Scribe were at my, Dr. Luke Bentley's, direction and personally dictated by me on 05/29/2019. I have reviewed the chart and agree that  the record accurately reflects my personal performance of the history, physical exam, assessment and plan. I have also personally directed, reviewed, and agree with the discharge instructions.\par

## 2019-05-29 NOTE — ASSESSMENT
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  Recent BM BX was c/w remission. Fish and pcr were negative. Step 1 complicated by hospitalization for neutropenic fevers and c-diff diarrhea 11/1-11/11. \par Was able to successfully collect her stem cell while inpt on 11/8 and 11/9. \par Now s/p Masha-Auto on 12/16/16. Hospital course complicated by pancytopenia and fungal PNA. 12/22 CT chest revealed new 1cm right lung nodule and was started on voriconazole. Also +RVP for rhinovirus/enterovirus. Demonstrated engraftment on 12/26 and d/c'ed in stable condition on 1/3/17.\par \par Now relapsed ph pos ALL......s/p re induction with inotuzimab...on Ponatinib to deepen response.....f/u fish and pcr\par Peripheral blood work stable and discussed with patient. PCR remains positive on pb...hx of subdural...no HA. \par Continued  Ponatinib 45 mg QOD  ...achieved CR.... discussed rationale, risk, benefits and side effects of therapy. Patient verbalized understanding and expressed agreement with treatment plan. 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 Engraftment was noted on 2/27/19. Course complicated by cdiff...100% donor\par \par Was hospitalized in march for upper GI gvhd...stage 1 overall grade 2...no gvhd noted today\par tacrolimus 2 mg oral capsule -- 2 cap(s) by mouth every 12 hours -- Indication: For immune suppression (plan to taper in 3-4 months)\par prednisone- continue taper- decrease to 30 mg QD as of 5/29/19, continue to cut by 10 mg each week \par acyclovir- discontinued and switched to Valcyte 450mg bid on 3/29/19- pt had been mistakenly taking acyclovir until 5/23/19- now on Valcyte 900 mg BID\par oxyCODONE 5 mg oral tablet -- 1 tab(s) by mouth every 6 hours, As Needed MDD:4 tabs -- Indication: For As needed for pain \par Cozaar 100 mg oral tablet -- 1 tab(s) by mouth once a day -- Indication: For HTN \par levETIRAcetam 500 mg oral tablet -- 1 tab(s) by mouth 2 times a day -- Indication: For Anti-seizure medication \par ondansetron 8 mg oral tablet -- 1 tab(s) by mouth 3 times a day, As Needed -- Indication: For As needed for nausea \par metoclopramide 10 mg oral tablet -- 1 tab(s) by mouth 4 times a day (before meals and at bedtime), As Needed -- Indication: For As needed for nausea \par fluconazole 200 mg oral tablet -- decreased to 1 tab(s) by mouth once a day on 4/24/19 -- Indication: For Antifungal prophylaxis  \par amLODIPine 5 mg oral tablet -- 1 tab(s) by mouth once a day -- Indication: For HTN \par Actigall 300 mg oral capsule -- 1 cap(s) by mouth 2 times a day -- Indication: For VOD (SOS) prophylaxis \par vancomycin- d/c'd 5/10/19 with resolution of diarrhea\par mycophenolate mofetil- discontinued while inpatient \par magnesium oxide 400 mg (241.3 mg elemental magnesium) oral tablet -- 1 tab(s) by mouth 3 times a day (with meals) -- Indication: For Supplement \par atovaquone 750 mg/5 mL oral suspension -- 5 milliliter(s) by mouth 2 times a day -- Indication: For PCP prophylaxis \par pantoprazole 40 mg oral delayed release tablet -- 1 tab(s) by mouth once a day (before a meal) -- Indication: For GI prophylaxis \par Multiple Vitamins oral tablet -- 1 tab(s) by mouth once a day -- Indication: For Supplement \par folic acid 1 mg oral tablet -- 1 tab(s) by mouth once a day -- Indication: For Supplement. \par \par Continue medications as instructed. \par Peripheral blood work reviewed and discussed with patient.\par Labs sent today for CMP, LDH, Mg, Tacrolimus level, CMV - PCR. \par FK level pending, will contact pt to change dose if needed.\par Maintain post-transplant diet and crowd restrictions. \par Patient advised to monitor for signs and symptoms of GVHD including but not limited to fever over 101, rash, nausea, vomiting, severe diarrhea, eye pain/dryness. - advised to contact immediately with any developing or worsening signs/symptoms. \par Continue proper picc maintenance- flushing daily. \par Well care stressed, questions addressed, support provided. \par \par Maintain weekly appointments at this time - pre-scheduled.

## 2019-05-29 NOTE — PHYSICAL EXAM
[Ambulatory and capable of all self care but unable to carry out any work activities] : Status 2- Ambulatory and capable of all self care but unable to carry out any work activities. Up and about more than 50% of waking hours [Normal] : affect appropriate [de-identified] : pic [de-identified] : stable gait

## 2019-05-29 NOTE — HISTORY OF PRESENT ILLNESS
[de-identified] : Ms. 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 Her induction course was complicated by tutu fever. She received cycle 2 mid may. BM BX prior was morphologically remission but pcr was positive. Cycle 4 was completed. Prolonged course with fever, pericardial effusion, possible fungal pna. I suspect fever and effusion due to sprycel. She also had neurologic / mental status changes after MTX. She has an omaya. She had two MTX IT. She feels well today.  s/p step 1 for stem cell mobilization..in CR1..S/P auto stem cell transplant with tam 200 mg/m2 prep...\par \par 10/4/18 - 11/20/18 : 58F hx HTN, Obesity, Ph(+) ALL s/p R Hypercavd x4 cycles IT MTX x2 s/p stem cell collection w/ Step 1(HD vp16 plus arac) stem cell mobilization regime. \par FISH and PCR negative. s/p Tam-Auto on 12/16/16. Patient prior to presentation had severe burning right abdominal pain with vesicular lesions and was diagnosed with shingles and received a 10 day course of antiviral medications. \par Patient on presentation had residual neuropathic pain.  Patient presented to the ER with severe occipital parietal headaches with nausea and vomiting. \par Patient also admitted to easy bruising mouth sores and dark stools. \par CT Head was done for complaints of headache which was (+) for SDH. Neurosurgery was consulted on initial detection of SDH, recommendations to keep platelets >80,000 was made.  LGIB was attributed to profound thrombocytopenia. 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 A PICC line was placed for chemotherapy and was initiated with Inotuzamab on Day 1, 8 and 15. IVF and Allopurinol for TLS. The patient has had refractory thrombocytopenia. H L A platelets were infused when they were available. when H L A platelets were not available 1/2 unit of platelet was infused over 3 hours. \par Follow up CT Head on 10/11 was stable and the patient continued to receive 1/2 units of platelets over 3 hours every 12 hours. The patient received last dose of Inotuzamab Cycle 1 on 10/22. On 10/15 patient was febrile,  a CT of the chest/abd/pelvis was done which showed scattered patchy ground glass opacities in right upper and lower lobes, suggestive of infection, patient received a course of IV antibiotics for Pneumonia. On 11/2 Blood cultures were found to be (+) for Co-agulase (-) Staph and patient received a course of Vancomycin. \par Repeat CT Head was completed on 10/23 for follow up and showed some new bleeding into previous collection. Findings were discussed with Neuro Surgery. \par HLA platelets were administered when available for refractory thrombocytopenia. \par Cycle 2 Inotuzumab was started on 11/6 which was given on Days 1, 8, 15. \par Patient tolerated second cycle without any adverse reactions. Patient for possible future Haploidentical Allogeneic stem cell transplant after discharge. \par Pre-BMT testing completed prior to discharge: Echo, MUGA, Xray sinuses, 24-Hr Urine for Creatinine. \par Patient had intermittent severe headaches on 11/3 and 11/15, repeat CTs showed nearly resolved hemorrhage. Headaches were managed with analgesics Fentanyl patch 37 mcg/hr and Oxycodone IR 15 mg q3 prn prior to discharge home. She is on ponatinib QOD b/c of increased LFT's.  \par \par Patient underwent a haploidentical PBSCT on 2/13/19 (son), hospital course as follows:\par Ms. 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 Her transplant course was complicated by shingles. In October, 2018 she relapsed (confirmed via flow cytometry). She was treated with inotuzumab x 2 cycles. Her course was complicated by subdural hemorrhage, refractory thrombocytopenia requiring HLA matched platelets, pneumonia, and coag negative staph bacteremia (treated with vancomycin). She is now admitted for a haplo-identical peripheral blood stem cell transplant from her son. \par \par Upon admission, a TLC was placed in IR. Ms. Galeas received IV hydration, pain management, antiemetics, nutritional support, antiviral / antibacterial / antifungal / PCP / GI / VOD (SOS) prophylaxis. Labs were monitored on a daily basis, and she received electrolyte repletion and transfusional support as needed. \par \par Ms. Galeas had a relatively uncomplicated transplant course. A baseline CT of the head was done on admission given her history of SDH and refractory thrombocytopenia. The baseline CT was negative. Ms. Galeas did experience pancytopenia related to the high dose chemotherapy preparative regimen. Her thrombocytopenia was refractory, and was managed by infusing 1/2 unit of platelets twice daily over 3 hours. Ms. Galeas also experienced neutropenic fevers. When she became neutropenic, she was started on prophylactic ciprofloxacin. When she developed fevers, blood and urine cultures were sent, a CXR completed and the ciprofloxacin was changed to cefepime. Her cultures and CXR remained negative. \par \par On 2/13/19, Ms. Galeas received 280ml of fresh, mobilized, haplo-identical, HPC apheresis over approximately 1 hour. Cell counts as follows: \par Total MNCs (x 10^8/kg) = 4.36 \par CD34+ cells (x 10^6/kg) = 7.78 \par Cell Viability (%) = 100% \par \par Engraftment was noted on 2/27/19. Post engraftment, the cefepime and zarxio were discontinued. Tacrolimus levels and CMV PCR were monitored twice weekly. A FISH for Y was sent to determine chimerism, with results pending. 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 She feels well overall, other than diarrhea this morning after eating late last night. She does not report any changes in ongoing cough. Denies fever, mouth sores, eye dryness, blurred vision. No CP, SOB or LE edema. She is taking Tacrolimus 2 mg BID, which she did not take today, Prednisone 40 mg QD, Valcyte 900 mg BID. She maintains proper picc maintenance, flushing daily.

## 2019-05-30 ENCOUNTER — LABORATORY RESULT (OUTPATIENT)
Age: 59
End: 2019-05-30

## 2019-06-03 PROCEDURE — 71046 X-RAY EXAM CHEST 2 VIEWS: CPT

## 2019-06-03 PROCEDURE — 88275 CYTOGENETICS 100-300: CPT

## 2019-06-03 PROCEDURE — 88271 CYTOGENETICS DNA PROBE: CPT

## 2019-06-03 PROCEDURE — 88237 TISSUE CULTURE BONE MARROW: CPT

## 2019-06-03 PROCEDURE — 81206 BCR/ABL1 GENE MAJOR BP: CPT

## 2019-06-06 ENCOUNTER — RESULT REVIEW (OUTPATIENT)
Age: 59
End: 2019-06-06

## 2019-06-06 ENCOUNTER — LABORATORY RESULT (OUTPATIENT)
Age: 59
End: 2019-06-06

## 2019-06-06 ENCOUNTER — APPOINTMENT (OUTPATIENT)
Dept: INFUSION THERAPY | Facility: HOSPITAL | Age: 59
End: 2019-06-06

## 2019-06-06 ENCOUNTER — APPOINTMENT (OUTPATIENT)
Dept: HEMATOLOGY ONCOLOGY | Facility: CLINIC | Age: 59
End: 2019-06-06
Payer: MEDICARE

## 2019-06-06 VITALS
HEART RATE: 90 BPM | SYSTOLIC BLOOD PRESSURE: 122 MMHG | OXYGEN SATURATION: 98 % | WEIGHT: 220.46 LBS | DIASTOLIC BLOOD PRESSURE: 80 MMHG | TEMPERATURE: 98.6 F | BODY MASS INDEX: 39.3 KG/M2 | RESPIRATION RATE: 16 BRPM

## 2019-06-06 LAB
BASOPHILS # BLD AUTO: 0 K/UL — SIGNIFICANT CHANGE UP (ref 0–0.2)
BASOPHILS NFR BLD AUTO: 0.4 % — SIGNIFICANT CHANGE UP (ref 0–2)
EOSINOPHIL # BLD AUTO: 0 K/UL — SIGNIFICANT CHANGE UP (ref 0–0.5)
EOSINOPHIL NFR BLD AUTO: 0.5 % — SIGNIFICANT CHANGE UP (ref 0–6)
HCT VFR BLD CALC: 39.5 % — SIGNIFICANT CHANGE UP (ref 34.5–45)
HGB BLD-MCNC: 13.5 G/DL — SIGNIFICANT CHANGE UP (ref 11.5–15.5)
LYMPHOCYTES # BLD AUTO: 1.7 K/UL — SIGNIFICANT CHANGE UP (ref 1–3.3)
LYMPHOCYTES # BLD AUTO: 21.1 % — SIGNIFICANT CHANGE UP (ref 13–44)
MCHC RBC-ENTMCNC: 34.2 G/DL — SIGNIFICANT CHANGE UP (ref 32–36)
MCHC RBC-ENTMCNC: 35.1 PG — HIGH (ref 27–34)
MCV RBC AUTO: 103 FL — HIGH (ref 80–100)
MONOCYTES # BLD AUTO: 0.1 K/UL — SIGNIFICANT CHANGE UP (ref 0–0.9)
MONOCYTES NFR BLD AUTO: 1 % — LOW (ref 2–14)
NEUTROPHILS # BLD AUTO: 6.1 K/UL — SIGNIFICANT CHANGE UP (ref 1.8–7.4)
NEUTROPHILS NFR BLD AUTO: 77 % — SIGNIFICANT CHANGE UP (ref 43–77)
PLATELET # BLD AUTO: 72 K/UL — LOW (ref 150–400)
RBC # BLD: 3.85 M/UL — SIGNIFICANT CHANGE UP (ref 3.8–5.2)
RBC # FLD: 15 % — HIGH (ref 10.3–14.5)
WBC # BLD: 8 K/UL — SIGNIFICANT CHANGE UP (ref 3.8–10.5)
WBC # FLD AUTO: 8 K/UL — SIGNIFICANT CHANGE UP (ref 3.8–10.5)

## 2019-06-06 PROCEDURE — 99214 OFFICE O/P EST MOD 30 MIN: CPT

## 2019-06-06 RX ORDER — ZOLPIDEM TARTRATE 5 MG/1
5 TABLET ORAL
Qty: 30 | Refills: 0 | Status: ACTIVE | COMMUNITY
Start: 2017-01-17 | End: 1900-01-01

## 2019-06-07 ENCOUNTER — OUTPATIENT (OUTPATIENT)
Dept: OUTPATIENT SERVICES | Facility: HOSPITAL | Age: 59
LOS: 1 days | Discharge: HOME | End: 2019-06-07

## 2019-06-07 DIAGNOSIS — C91.00 ACUTE LYMPHOBLASTIC LEUKEMIA NOT HAVING ACHIEVED REMISSION: ICD-10-CM

## 2019-06-07 DIAGNOSIS — Z94.84 STEM CELLS TRANSPLANT STATUS: ICD-10-CM

## 2019-06-07 DIAGNOSIS — D17.9 BENIGN LIPOMATOUS NEOPLASM, UNSPECIFIED: Chronic | ICD-10-CM

## 2019-06-07 DIAGNOSIS — Z41.9 ENCOUNTER FOR PROCEDURE FOR PURPOSES OTHER THAN REMEDYING HEALTH STATE, UNSPECIFIED: Chronic | ICD-10-CM

## 2019-06-07 DIAGNOSIS — Z98.89 OTHER SPECIFIED POSTPROCEDURAL STATES: Chronic | ICD-10-CM

## 2019-06-09 NOTE — REVIEW OF SYSTEMS
[Fatigue] : fatigue [Insomnia] : insomnia [Negative] : Gastrointestinal [Fever] : no fever [Chills] : no chills [Night Sweats] : no night sweats [Muscle Weakness] : no muscle weakness [FreeTextEntry9] :  occasional back pain

## 2019-06-09 NOTE — ASSESSMENT
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 Relapsed ph pos ALL...s/p re induction with inotuzimab..Achieved CR. 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 \par \par 1) PH Positive ALL\par S/P Haploidentical PBSCT on 2/13/19 (son)\par Fish for Y on 5/29/19 100% donor\par Pending post transplant BMbx\par \par 2)Heme\par Counts stable, no indication for transfusion\par WBC 8  ANC 6.1  Hgb 13.5   Plt 72\par Continue folic acid and multivitamin\par \par 3) ID\par Continue ppx:\par Mepron 750 mg BID\par fluconazole 200 mg oral tablet -- decreased to 1 tab(s) by mouth once a day on 4/24/19\par Acyclovir 400 mg TID discontinued and switched to valcyte 450 mg BID on 3/29/19. Patient had mistakenly been taking acyclovir until 5/23/19- now on valcyte 900 mg BID\par vancomycin- d/c'd 5/10/19 with resolution of diarrhea\par \par CMV Viremia\par CMV on 5/23/19 82997. On 5/29/19 CMV 1729. Continue to monitor CMV PCR weekly\par \par 4) GVHD\par Skin 0 Liver 0 GI 0- overall grade 0\par No signs of acute/chronic GVHD\par Continue FK 1 mg BID- pending today's level\par Mycophenolate mofetil discontinued while inpatient\par Signs/symptoms of GVHD reviewed.\par \par Was hospitalized in March 2019 for upper GI gvhd...stage 1 overall grade 2...no gvhd noted today\par Continue prednisone taper. On 6/6/19 decrease prednisone to 20 mg daily.\par \par 5)GI\par Reglan and zofran PRN\par Protonix 40 mg daily\par Actigall 300 mg oral capsule -- 1 cap(s) by mouth 2 times a day -- Indication: For VOD (SOS) prophylaxis\par \par 6) HTN\par Cozaar 100 mg oral tablet -- 1 tab(s) by mouth once a day \par amLODIPine 5 mg oral tablet -- 1 tab(s) by mouth once a day \par \par 7) Other\par Hypomagnesia- magnesium oxide 400 mg TID\par oxyCODONE 5 mg oral tablet -- 1 tab(s) by mouth every 6 hours, As Needed for pain\par levETIRAcetam 500 mg oral tablet -- 1 tab(s) by mouth 2 times a day \par Continue proper PICC maintenance- flushed daily\par \par 8) Goals\par Continue post transplant diet and crowd restrictions. \par Discussed risk for infection and GVHD, signs/symptoms of infection and GVHD, post- transplant medications, anticipated duration of immunosuppression, post transplant crowd and dietary restrictions.\par Questions and concerns addressed\par Maintain weekly appointments\par Follow up appointment in one week with QUAN Herron \par

## 2019-06-09 NOTE — PHYSICAL EXAM
[Ambulatory and capable of all self care but unable to carry out any work activities] : Status 2- Ambulatory and capable of all self care but unable to carry out any work activities. Up and about more than 50% of waking hours [Normal] : affect appropriate [de-identified] : Right single lumen PICC, clean, dry and intact [de-identified] : stable gait

## 2019-06-09 NOTE — HISTORY OF PRESENT ILLNESS
[de-identified] : Ms. Galeas is a 59 year old female who was dx with ph+ ALL in april 2016...she initially presented to [facility] and was transferred to [facility] where she received R HyperCVAD. Her induction course was complicated by tutu fever. She received cycle 2 mid may. BM BX prior was morphologically remission but pcr was positive. Cycle 4 was completed. Prolonged course with fever, pericardial effusion, possible fungal pna. I suspect fever and effusion due to sprycel. She also had neurologic / mental status changes after MTX. She has an omaya. She had two MTX IT. She feels well today.  s/p step 1 for stem cell mobilization..in CR1..S/P auto stem cell transplant with tam 200 mg/m2 prep...\par \par 10/4/18 - 11/20/18 : 58F hx HTN, Obesity, Ph(+) ALL s/p R Hypercavd x4 cycles IT MTX x2 s/p stem cell collection w/ Step 1(HD vp16 plus arac) stem cell mobilization regime. \par FISH and PCR negative. s/p Tam-Auto on 12/16/16. Patient prior to presentation had severe burning right abdominal pain with vesicular lesions and was diagnosed with shingles and received a 10 day course of antiviral medications. \par Patient on presentation had residual neuropathic pain.  Patient presented to the ER with severe occipital parietal headaches with nausea and vomiting. \par Patient also admitted to easy bruising mouth sores and dark stools. \par CT Head was done for complaints of headache which was (+) for SDH. Neurosurgery was consulted on initial detection of SDH, recommendations to keep platelets >80,000 was made.  LGIB was attributed to profound thrombocytopenia. The patient was transferred to [address] [place] [address] and upon confirmation of peripheral blood flow the patient was noted to have relapsed B-ALL Ph (+). A PICC line was placed for chemotherapy and was initiated with Inotuzamab on Day 1, 8 and 15. IVF and Allopurinol for TLS. The patient has had refractory thrombocytopenia. H L A platelets were infused when they were available. when H L A platelets were not available 1/2 unit of platelet was infused over 3 hours. \par Follow up CT Head on 10/11 was stable and the patient continued to receive 1/2 units of platelets over 3 hours every 12 hours. The patient received last dose of Inotuzamab Cycle 1 on 10/22. On 10/15 patient was febrile,  a CT of the chest/abd/pelvis was done which showed scattered patchy ground glass opacities in right upper and lower lobes, suggestive of infection, patient received a course of IV antibiotics for Pneumonia. On 11/2 Blood cultures were found to be (+) for Co-agulase (-) Staph and patient received a course of Vancomycin. \par Repeat CT Head was completed on 10/23 for follow up and showed some new bleeding into previous collection. Findings were discussed with Neuro Surgery. \par HLA platelets were administered when available for refractory thrombocytopenia. \par Cycle 2 Inotuzumab was started on 11/6 which was given on Days 1, 8, 15. \par Patient tolerated second cycle without any adverse reactions. Patient for possible future Haploidentical Allogeneic stem cell transplant after discharge. \par Pre-BMT testing completed prior to discharge: Echo, MUGA, Xray sinuses, 24-Hr Urine for Creatinine. \par Patient had intermittent severe headaches on 11/3 and 11/15, repeat CTs showed nearly resolved hemorrhage. Headaches were managed with analgesics Fentanyl patch 37 mcg/hr and Oxycodone IR 15 mg q3 prn prior to discharge home. She is on ponatinib QOD b/c of increased LFT's.  \par \par Patient underwent a haploidentical PBSCT on 2/13/19 (son), hospital course as follows:\par Ms. 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 Her transplant course was complicated by shingles. In October, 2018 she relapsed (confirmed via flow cytometry). She was treated with inotuzumab x 2 cycles. Her course was complicated by subdural hemorrhage, refractory thrombocytopenia requiring HLA matched platelets, pneumonia, and coag negative staph bacteremia (treated with vancomycin). She is now admitted for a haplo-identical peripheral blood stem cell transplant from her son. \par \par Upon admission, a TLC was placed in IR. Ms. Galeas received IV hydration, pain management, antiemetics, nutritional support, antiviral / antibacterial / antifungal / PCP / GI / VOD (SOS) prophylaxis. Labs were monitored on a daily basis, and she received electrolyte repletion and transfusional support as needed. \par \par Ms. Galeas had a relatively uncomplicated transplant course. A baseline CT of the head was done on admission given her history of SDH and refractory thrombocytopenia. The baseline CT was negative. Ms. Galeas did experience pancytopenia related to the high dose chemotherapy preparative regimen. Her thrombocytopenia was refractory, and was managed by infusing 1/2 unit of platelets twice daily over 3 hours. Ms. Galeas also experienced neutropenic fevers. When she became neutropenic, she was started on prophylactic ciprofloxacin. When she developed fevers, blood and urine cultures were sent, a CXR completed and the ciprofloxacin was changed to cefepime. Her cultures and CXR remained negative. \par \par On 2/13/19, Ms. Galeas received 280ml of fresh, mobilized, haplo-identical, HPC apheresis over approximately 1 hour. Cell counts as follows: \par Total MNCs (x 10^8/kg) = 4.36 \par CD34+ cells (x 10^6/kg) = 7.78 \par Cell Viability (%) = 100% \par \par Engraftment was noted on 2/27/19. Post engraftment, the cefepime and zarxio were discontinued. Tacrolimus levels and CMV PCR were monitored twice weekly. A FISH for Y was sent to determine chimerism, with results pending. 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 She feels well overall, other than diarrhea this morning after eating late last night. She does not report any changes in ongoing cough. Denies fever, mouth sores, eye dryness, blurred vision. No CP, SOB or LE edema. She is taking Tacrolimus 2 mg BID, which she did not take today, Prednisone 40 mg QD, Valcyte 900 mg BID. She maintains proper picc maintenance, flushing daily.\par \par On 6/6/19, patient presents for a follow up visit. Today is +113 days post ALLO PBSCT. Patient states she has insomnia and fatigue. Right PICC line in place and flushed daily. Currently on tacrolimus 1 mg BID, prednisone 30 mg daily and valcyte 900 mg BID. Denies fever, chills, nausea, vomiting, diarrhea, rash, mouth sores or dysuria. Denies SOB, chest pain or B/L LE edema. Denies any pain at this time.

## 2019-06-09 NOTE — REASON FOR VISIT
[Follow-Up Visit] : a follow-up visit for [Acute Lymphoblastic Leukemia] : acute lymphoblastic leukemia [FreeTextEntry2] :  s/p Masha-Auto on 12/16/16, s/p haplo-identical PBSCT on 2/13/19

## 2019-06-10 LAB — CHROM ANALY INTERPHASE BLD FISH-IMP: SIGNIFICANT CHANGE UP

## 2019-06-12 ENCOUNTER — RESULT REVIEW (OUTPATIENT)
Age: 59
End: 2019-06-12

## 2019-06-12 ENCOUNTER — LABORATORY RESULT (OUTPATIENT)
Age: 59
End: 2019-06-12

## 2019-06-12 ENCOUNTER — APPOINTMENT (OUTPATIENT)
Dept: HEMATOLOGY ONCOLOGY | Facility: CLINIC | Age: 59
End: 2019-06-12
Payer: MEDICARE

## 2019-06-12 ENCOUNTER — APPOINTMENT (OUTPATIENT)
Dept: INFUSION THERAPY | Facility: HOSPITAL | Age: 59
End: 2019-06-12

## 2019-06-12 ENCOUNTER — OUTPATIENT (OUTPATIENT)
Dept: OUTPATIENT SERVICES | Facility: HOSPITAL | Age: 59
LOS: 1 days | End: 2019-06-12
Payer: MEDICARE

## 2019-06-12 ENCOUNTER — APPOINTMENT (OUTPATIENT)
Dept: HEMATOLOGY ONCOLOGY | Facility: CLINIC | Age: 59
End: 2019-06-12

## 2019-06-12 VITALS
HEART RATE: 98 BPM | OXYGEN SATURATION: 96 % | TEMPERATURE: 98.3 F | WEIGHT: 220.9 LBS | BODY MASS INDEX: 39.38 KG/M2 | DIASTOLIC BLOOD PRESSURE: 88 MMHG | RESPIRATION RATE: 16 BRPM | SYSTOLIC BLOOD PRESSURE: 128 MMHG

## 2019-06-12 DIAGNOSIS — Z98.89 OTHER SPECIFIED POSTPROCEDURAL STATES: Chronic | ICD-10-CM

## 2019-06-12 DIAGNOSIS — D17.9 BENIGN LIPOMATOUS NEOPLASM, UNSPECIFIED: Chronic | ICD-10-CM

## 2019-06-12 DIAGNOSIS — G47.00 INSOMNIA, UNSPECIFIED: ICD-10-CM

## 2019-06-12 DIAGNOSIS — Z94.84 STEM CELLS TRANSPLANT STATUS: ICD-10-CM

## 2019-06-12 DIAGNOSIS — Z41.9 ENCOUNTER FOR PROCEDURE FOR PURPOSES OTHER THAN REMEDYING HEALTH STATE, UNSPECIFIED: Chronic | ICD-10-CM

## 2019-06-12 LAB
BASOPHILS # BLD AUTO: 0 K/UL — SIGNIFICANT CHANGE UP (ref 0–0.2)
BASOPHILS NFR BLD AUTO: 0.3 % — SIGNIFICANT CHANGE UP (ref 0–2)
EOSINOPHIL # BLD AUTO: 0 K/UL — SIGNIFICANT CHANGE UP (ref 0–0.5)
EOSINOPHIL NFR BLD AUTO: 0.5 % — SIGNIFICANT CHANGE UP (ref 0–6)
HCT VFR BLD CALC: 39.4 % — SIGNIFICANT CHANGE UP (ref 34.5–45)
HGB BLD-MCNC: 13.5 G/DL — SIGNIFICANT CHANGE UP (ref 11.5–15.5)
LYMPHOCYTES # BLD AUTO: 2 K/UL — SIGNIFICANT CHANGE UP (ref 1–3.3)
LYMPHOCYTES # BLD AUTO: 24 % — SIGNIFICANT CHANGE UP (ref 13–44)
MCHC RBC-ENTMCNC: 34.2 G/DL — SIGNIFICANT CHANGE UP (ref 32–36)
MCHC RBC-ENTMCNC: 35.4 PG — HIGH (ref 27–34)
MCV RBC AUTO: 103 FL — HIGH (ref 80–100)
MONOCYTES # BLD AUTO: 0.1 K/UL — SIGNIFICANT CHANGE UP (ref 0–0.9)
MONOCYTES NFR BLD AUTO: 1 % — LOW (ref 2–14)
NEUTROPHILS # BLD AUTO: 6.3 K/UL — SIGNIFICANT CHANGE UP (ref 1.8–7.4)
NEUTROPHILS NFR BLD AUTO: 74.2 % — SIGNIFICANT CHANGE UP (ref 43–77)
PLATELET # BLD AUTO: 92 K/UL — LOW (ref 150–400)
RBC # BLD: 3.81 M/UL — SIGNIFICANT CHANGE UP (ref 3.8–5.2)
RBC # FLD: 15.6 % — HIGH (ref 10.3–14.5)
WBC # BLD: 8.5 K/UL — SIGNIFICANT CHANGE UP (ref 3.8–10.5)
WBC # FLD AUTO: 8.5 K/UL — SIGNIFICANT CHANGE UP (ref 3.8–10.5)

## 2019-06-12 PROCEDURE — 99214 OFFICE O/P EST MOD 30 MIN: CPT

## 2019-06-12 NOTE — REVIEW OF SYSTEMS
[Fatigue] : fatigue [Insomnia] : insomnia [Negative] : Heme/Lymph [Fever] : no fever [Muscle Weakness] : no muscle weakness [Chills] : no chills [Night Sweats] : no night sweats [Dizziness] : no dizziness [de-identified] : Uses a cane to ambulate [FreeTextEntry9] :  occasional back pain [de-identified] : Insomnia has improved significantly on ambien

## 2019-06-12 NOTE — ASSESSMENT
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 Relapsed ph pos ALL...s/p re induction with inotuzimab..Achieved CR. 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 \par \par 1) PH Positive ALL\par S/P Haploidentical PBSCT on 2/13/19 (son)\par Fish for Y on 6/6/19 100% donor\par Pending post transplant BMbx\par \par 2)Heme\par Counts stable, no indication for transfusion\par WBC 8.5  ANC 6.3  Hgb 13.5   Plt 92\par Continue folic acid and multivitamin\par \par 3) ID\par Continue ppx:\par Mepron 750 mg BID\par fluconazole 200 mg oral tablet -- decreased to 1 tab(s) by mouth once a day on 4/24/19\par Acyclovir 400 mg TID discontinued and switched to valcyte 450 mg BID on 3/29/19.\par Patient had mistakenly been taking acyclovir until 5/23/19- now on valcyte 900 mg BID. As of today 6/12/19 decrease valcyte to 450 mg BID.\par vancomycin- d/c'd 5/10/19 with resolution of diarrhea\par \par CMV Viremia\par CMV on 5/23/19 184[address]. On 6/6/19 . Continue to monitor CMV PCR weekly\par \par 4) GVHD\par Skin 0 Liver 0 GI 0- overall grade 0\par No signs of acute/chronic GVHD\par Continue FK 1 mg BID- pending today's level\par Mycophenolate mofetil discontinued while inpatient\par Signs/symptoms of GVHD reviewed.\par \par Was hospitalized in March 2019 for upper GI gvhd...stage 1 overall grade 2...no gvhd noted today\par Continue prednisone taper. On 6/12/19 decrease prednisone to 10 mg daily. Medication renewed today.\par \par 5)GI\par Reglan and zofran PRN\par Protonix 40 mg daily\par Actigall 300 mg oral capsule -- 1 cap(s) by mouth 2 times a day -- Indication: For VOD (SOS) prophylaxis\par \par 6) HTN\par Cozaar 100 mg oral tablet -- 1 tab(s) by mouth once a day \par amLODIPine 5 mg oral tablet -- 1 tab(s) by mouth once a day \par \par 7) Other\par Hypomagnesia- magnesium oxide 400 mg TID\par oxyCODONE 5 mg oral tablet -- 1 tab(s) by mouth every 6 hours, As Needed for pain\par levETIRAcetam 500 mg oral tablet -- 1 tab(s) by mouth 2 times a day \par Continue proper PICC maintenance- flushed daily\par \par 8) Goals\par Continue post transplant diet and crowd restrictions. \par Discussed risk for infection and GVHD, signs/symptoms of infection and GVHD, post- transplant medications, anticipated duration of immunosuppression, post transplant crowd and dietary restrictions.\par Questions and concerns addressed\par Maintain weekly appointments\par Follow up appointment in one week with QUAN Herron \par

## 2019-06-12 NOTE — REASON FOR VISIT
[Acute Lymphoblastic Leukemia] : acute lymphoblastic leukemia [Follow-Up Visit] : a follow-up visit for [FreeTextEntry2] :  s/p Masha-Auto on 12/16/16, s/p haplo-identical PBSCT on 2/13/19

## 2019-06-12 NOTE — PHYSICAL EXAM
[Ambulatory and capable of all self care but unable to carry out any work activities] : Status 2- Ambulatory and capable of all self care but unable to carry out any work activities. Up and about more than 50% of waking hours [Normal] : affect appropriate [de-identified] : Right single lumen PICC, clean, dry and intact [de-identified] : stable gait

## 2019-06-12 NOTE — HISTORY OF PRESENT ILLNESS
[de-identified] : Ms. Galeas is a 59 year old female who was dx with ph+ ALL in april 2016...she initially presented to [facility] and was transferred to [facility] where she received R HyperCVAD. Her induction course was complicated by tutu fever. She received cycle 2 mid may. BM BX prior was morphologically remission but pcr was positive. Cycle 4 was completed. Prolonged course with fever, pericardial effusion, possible fungal pna. I suspect fever and effusion due to sprycel. She also had neurologic / mental status changes after MTX. She has an omaya. She had two MTX IT. She feels well today.  s/p step 1 for stem cell mobilization..in CR1..S/P auto stem cell transplant with tam 200 mg/m2 prep...\par \par 10/4/18 - 11/20/18 : 58F hx HTN, Obesity, Ph(+) ALL s/p R Hypercavd x4 cycles IT MTX x2 s/p stem cell collection w/ Step 1(HD vp16 plus arac) stem cell mobilization regime. \par FISH and PCR negative. s/p Tam-Auto on 12/16/16. Patient prior to presentation had severe burning right abdominal pain with vesicular lesions and was diagnosed with shingles and received a 10 day course of antiviral medications. \par Patient on presentation had residual neuropathic pain.  Patient presented to the ER with severe occipital parietal headaches with nausea and vomiting. \par Patient also admitted to easy bruising mouth sores and dark stools. \par CT Head was done for complaints of headache which was (+) for SDH. Neurosurgery was consulted on initial detection of SDH, recommendations to keep platelets >80,000 was made.  LGIB was attributed to profound thrombocytopenia. The patient was transferred to [address] [place] [address] and upon confirmation of peripheral blood flow the patient was noted to have relapsed B-ALL Ph (+). A PICC line was placed for chemotherapy and was initiated with Inotuzamab on Day 1, 8 and 15. IVF and Allopurinol for TLS. The patient has had refractory thrombocytopenia. H L A platelets were infused when they were available. when H L A platelets were not available 1/2 unit of platelet was infused over 3 hours. \par Follow up CT Head on 10/11 was stable and the patient continued to receive 1/2 units of platelets over 3 hours every 12 hours. The patient received last dose of Inotuzamab Cycle 1 on 10/22. On 10/15 patient was febrile,  a CT of the chest/abd/pelvis was done which showed scattered patchy ground glass opacities in right upper and lower lobes, suggestive of infection, patient received a course of IV antibiotics for Pneumonia. On 11/2 Blood cultures were found to be (+) for Co-agulase (-) Staph and patient received a course of Vancomycin. \par Repeat CT Head was completed on 10/23 for follow up and showed some new bleeding into previous collection. Findings were discussed with Neuro Surgery. \par HLA platelets were administered when available for refractory thrombocytopenia. \par Cycle 2 Inotuzumab was started on 11/6 which was given on Days 1, 8, 15. \par Patient tolerated second cycle without any adverse reactions. Patient for possible future Haploidentical Allogeneic stem cell transplant after discharge. \par Pre-BMT testing completed prior to discharge: Echo, MUGA, Xray sinuses, 24-Hr Urine for Creatinine. \par Patient had intermittent severe headaches on 11/3 and 11/15, repeat CTs showed nearly resolved hemorrhage. Headaches were managed with analgesics Fentanyl patch 37 mcg/hr and Oxycodone IR 15 mg q3 prn prior to discharge home. She is on ponatinib QOD b/c of increased LFT's.  \par \par Patient underwent a haploidentical PBSCT on 2/13/19 (son), hospital course as follows:\par Ms. 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 Her transplant course was complicated by shingles. In October, 2018 she relapsed (confirmed via flow cytometry). She was treated with inotuzumab x 2 cycles. Her course was complicated by subdural hemorrhage, refractory thrombocytopenia requiring HLA matched platelets, pneumonia, and coag negative staph bacteremia (treated with vancomycin). She is now admitted for a haplo-identical peripheral blood stem cell transplant from her son. \par \par Upon admission, a TLC was placed in IR. Ms. Galeas received IV hydration, pain management, antiemetics, nutritional support, antiviral / antibacterial / antifungal / PCP / GI / VOD (SOS) prophylaxis. Labs were monitored on a daily basis, and she received electrolyte repletion and transfusional support as needed. \par \par Ms. Galeas had a relatively uncomplicated transplant course. A baseline CT of the head was done on admission given her history of SDH and refractory thrombocytopenia. The baseline CT was negative. Ms. Galeas did experience pancytopenia related to the high dose chemotherapy preparative regimen. Her thrombocytopenia was refractory, and was managed by infusing 1/2 unit of platelets twice daily over 3 hours. Ms. Galeas also experienced neutropenic fevers. When she became neutropenic, she was started on prophylactic ciprofloxacin. When she developed fevers, blood and urine cultures were sent, a CXR completed and the ciprofloxacin was changed to cefepime. Her cultures and CXR remained negative. \par \par On 2/13/19, Ms. Galeas received 280ml of fresh, mobilized, haplo-identical, HPC apheresis over approximately 1 hour. Cell counts as follows: \par Total MNCs (x 10^8/kg) = 4.36 \par CD34+ cells (x 10^6/kg) = 7.78 \par Cell Viability (%) = 100% \par \par Engraftment was noted on 2/27/19. Post engraftment, the cefepime and zarxio were discontinued. Tacrolimus levels and CMV PCR were monitored twice weekly. A FISH for Y was sent to determine chimerism, with results pending. 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 She feels well overall, other than diarrhea this morning after eating late last night. She does not report any changes in ongoing cough. Denies fever, mouth sores, eye dryness, blurred vision. No CP, SOB or LE edema. She is taking Tacrolimus 2 mg BID, which she did not take today, Prednisone 40 mg QD, Valcyte 900 mg BID. She maintains proper picc maintenance, flushing daily.\par \par On 6/6/19, patient presents for a follow up visit. Today is +113 days post ALLO PBSCT. Patient states she has insomnia and fatigue. Right PICC line in place and flushed daily. Currently on tacrolimus 1 mg BID, prednisone 30 mg daily and valcyte 900 mg BID. Denies fever, chills, nausea, vomiting, diarrhea, rash, mouth sores or dysuria. Denies SOB, chest pain or B/L LE edema. Denies any pain at this time. \par \par On 6/12/19, patient presents for a follow up visit. Today is + 119 days post ALLO PBSCT. Patient states she is doing well overall and offers no acute concerns today. Right PICC line in place and flushed daily. Denies fever, chills, nausea, vomiting, diarrhea, rash, mouth sores, dysuria, cough or any active signs of bleeding. Denies SOB, chest pain or B/L LE edema. Currently on tacrolimus 1 mg BID, prednisone 20 mg daily and valcyte 900 mg BID. States insomnia has improved significantly since starting ambien. Denies any pain at this time.

## 2019-06-14 LAB — CHROM ANALY INTERPHASE BLD FISH-IMP: SIGNIFICANT CHANGE UP

## 2019-06-19 ENCOUNTER — MESSAGE (OUTPATIENT)
Age: 59
End: 2019-06-19

## 2019-06-19 DIAGNOSIS — Z94.84 STEM CELLS TRANSPLANT STATUS: ICD-10-CM

## 2019-06-20 ENCOUNTER — LABORATORY RESULT (OUTPATIENT)
Age: 59
End: 2019-06-20

## 2019-06-20 ENCOUNTER — RESULT REVIEW (OUTPATIENT)
Age: 59
End: 2019-06-20

## 2019-06-20 ENCOUNTER — APPOINTMENT (OUTPATIENT)
Dept: HEMATOLOGY ONCOLOGY | Facility: CLINIC | Age: 59
End: 2019-06-20
Payer: MEDICARE

## 2019-06-20 ENCOUNTER — APPOINTMENT (OUTPATIENT)
Dept: INFUSION THERAPY | Facility: HOSPITAL | Age: 59
End: 2019-06-20

## 2019-06-20 VITALS
OXYGEN SATURATION: 99 % | DIASTOLIC BLOOD PRESSURE: 83 MMHG | HEART RATE: 90 BPM | WEIGHT: 223.77 LBS | RESPIRATION RATE: 16 BRPM | SYSTOLIC BLOOD PRESSURE: 118 MMHG | BODY MASS INDEX: 39.89 KG/M2

## 2019-06-20 LAB
BASOPHILS # BLD AUTO: 0 K/UL — SIGNIFICANT CHANGE UP (ref 0–0.2)
BASOPHILS NFR BLD AUTO: 0.2 % — SIGNIFICANT CHANGE UP (ref 0–2)
EOSINOPHIL # BLD AUTO: 0 K/UL — SIGNIFICANT CHANGE UP (ref 0–0.5)
EOSINOPHIL NFR BLD AUTO: 0.4 % — SIGNIFICANT CHANGE UP (ref 0–6)
HCT VFR BLD CALC: 39.3 % — SIGNIFICANT CHANGE UP (ref 34.5–45)
HGB BLD-MCNC: 13.2 G/DL — SIGNIFICANT CHANGE UP (ref 11.5–15.5)
LYMPHOCYTES # BLD AUTO: 1.9 K/UL — SIGNIFICANT CHANGE UP (ref 1–3.3)
LYMPHOCYTES # BLD AUTO: 33.3 % — SIGNIFICANT CHANGE UP (ref 13–44)
MCHC RBC-ENTMCNC: 33.5 G/DL — SIGNIFICANT CHANGE UP (ref 32–36)
MCHC RBC-ENTMCNC: 34.7 PG — HIGH (ref 27–34)
MCV RBC AUTO: 104 FL — HIGH (ref 80–100)
MONOCYTES # BLD AUTO: 0.3 K/UL — SIGNIFICANT CHANGE UP (ref 0–0.9)
MONOCYTES NFR BLD AUTO: 5.7 % — SIGNIFICANT CHANGE UP (ref 2–14)
NEUTROPHILS # BLD AUTO: 3.5 K/UL — SIGNIFICANT CHANGE UP (ref 1.8–7.4)
NEUTROPHILS NFR BLD AUTO: 60.4 % — SIGNIFICANT CHANGE UP (ref 43–77)
PLATELET # BLD AUTO: 130 K/UL — LOW (ref 150–400)
RBC # BLD: 3.8 M/UL — SIGNIFICANT CHANGE UP (ref 3.8–5.2)
RBC # FLD: 15.2 % — HIGH (ref 10.3–14.5)
WBC # BLD: 5.8 K/UL — SIGNIFICANT CHANGE UP (ref 3.8–10.5)
WBC # FLD AUTO: 5.8 K/UL — SIGNIFICANT CHANGE UP (ref 3.8–10.5)

## 2019-06-20 PROCEDURE — 99214 OFFICE O/P EST MOD 30 MIN: CPT

## 2019-06-20 PROCEDURE — G0452: CPT | Mod: 26

## 2019-06-24 ENCOUNTER — APPOINTMENT (OUTPATIENT)
Dept: HEMATOLOGY ONCOLOGY | Facility: CLINIC | Age: 59
End: 2019-06-24

## 2019-06-24 LAB — CHROM ANALY INTERPHASE BLD FISH-IMP: SIGNIFICANT CHANGE UP

## 2019-06-25 LAB — BCR/ABL BY RT - PCR QUANTITATIVE: SIGNIFICANT CHANGE UP

## 2019-06-25 NOTE — ASSESSMENT
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 Relapsed ph pos ALL...s/p re induction with inotuzimab..Achieved CR. 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 \par \par 1) PH Positive ALL\par S/P Haploidentical PBSCT on 2/13/19 (son)\par Fish for Y on 6/12/19 100% donor\par Pending post transplant BMbx\par \par 2)Heme\par Counts stable, no indication for transfusion\par WBC 5.8  ANC 3.5  Hgb 13.2   Plt 130\par Continue folic acid and multivitamin\par \par 3) ID\par Continue ppx:\par Mepron 750 mg BID\par fluconazole 200 mg oral tablet -- decreased to 1 tab(s) by mouth once a day on 4/24/19\par Acyclovir 400 mg TID discontinued and switched to valcyte 450 mg BID on 3/29/19.\par Patient had mistakenly been taking acyclovir until 5/23/19- now on valcyte 900 mg BID. As of  6/12/19 valcyte decreased to 450 mg BID.\par vancomycin- d/c'd 5/10/19 with resolution of diarrhea\par \par CMV Viremia\par CMV on 5/23/19 60[address]. On 6/12/19 CMV <50. Continue to monitor CMV PCR weekly\par \par 4) GVHD\par Skin 0 Liver 0 GI 0- overall grade 0\par No signs of acute/chronic GVHD\par Continue FK 1 mg BID- pending today's level\par Mycophenolate mofetil discontinued while inpatient\par Signs/symptoms of GVHD reviewed.\par \par Was hospitalized in March 2019 for upper GI gvhd...stage 1 overall grade 2...no gvhd noted today\par Continue prednisone taper. On 6/20/19 decrease prednisone to 10 mg every other day.\par \par 5)GI\par Reglan and zofran PRN\par Protonix 40 mg daily\par Actigall 300 mg oral capsule -- 1 cap(s) by mouth 2 times a day -- Indication: For VOD (SOS) prophylaxis\par \par 6) HTN\par Cozaar 100 mg oral tablet -- 1 tab(s) by mouth once a day \par amLODIPine 5 mg oral tablet -- 1 tab(s) by mouth once a day \par \par 7) Other\par Hypomagnesia- magnesium oxide 400 mg TID\par oxyCODONE 5 mg oral tablet -- 1 tab(s) by mouth every 6 hours, As Needed for pain\par levETIRAcetam 500 mg oral tablet -- 1 tab(s) by mouth 2 times a day \par Ambien 5 mg at bedtime as needed\par Continue proper PICC maintenance- flushed daily\par \par 8) Goals\par Continue post transplant diet and crowd restrictions. \par Discussed risk for infection and GVHD, signs/symptoms of infection and GVHD, post- transplant medications, anticipated duration of immunosuppression, post transplant crowd and dietary restrictions.\par Questions and concerns addressed\par Maintain weekly appointments\par Follow up appointment in one week with QUAN Herron\par

## 2019-06-25 NOTE — HISTORY OF PRESENT ILLNESS
[de-identified] : Ms. 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 Her induction course was complicated by tutu fever. She received cycle 2 mid may. BM BX prior was morphologically remission but pcr was positive. Cycle 4 was completed. Prolonged course with fever, pericardial effusion, possible fungal pna. I suspect fever and effusion due to sprycel. She also had neurologic / mental status changes after MTX. She has an omaya. She had two MTX IT. She feels well today.  s/p step 1 for stem cell mobilization..in CR1..S/P auto stem cell transplant with tam 200 mg/m2 prep...\par \par 10/4/18 - 11/20/18 : 58F hx HTN, Obesity, Ph(+) ALL s/p R Hypercavd x4 cycles IT MTX x2 s/p stem cell collection w/ Step 1(HD vp16 plus arac) stem cell mobilization regime. \par FISH and PCR negative. s/p Tam-Auto on 12/16/16. Patient prior to presentation had severe burning right abdominal pain with vesicular lesions and was diagnosed with shingles and received a 10 day course of antiviral medications. \par Patient on presentation had residual neuropathic pain.  Patient presented to the ER with severe occipital parietal headaches with nausea and vomiting. \par Patient also admitted to easy bruising mouth sores and dark stools. \par CT Head was done for complaints of headache which was (+) for SDH. Neurosurgery was consulted on initial detection of SDH, recommendations to keep platelets >80,000 was made.  LGIB was attributed to profound thrombocytopenia. 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 A PICC line was placed for chemotherapy and was initiated with Inotuzamab on Day 1, 8 and 15. IVF and Allopurinol for TLS. The patient has had refractory thrombocytopenia. H L A platelets were infused when they were available. when H L A platelets were not available 1/2 unit of platelet was infused over 3 hours. \par Follow up CT Head on 10/11 was stable and the patient continued to receive 1/2 units of platelets over 3 hours every 12 hours. The patient received last dose of Inotuzamab Cycle 1 on 10/22. On 10/15 patient was febrile,  a CT of the chest/abd/pelvis was done which showed scattered patchy ground glass opacities in right upper and lower lobes, suggestive of infection, patient received a course of IV antibiotics for Pneumonia. On 11/2 Blood cultures were found to be (+) for Co-agulase (-) Staph and patient received a course of Vancomycin. \par Repeat CT Head was completed on 10/23 for follow up and showed some new bleeding into previous collection. Findings were discussed with Neuro Surgery. \par HLA platelets were administered when available for refractory thrombocytopenia. \par Cycle 2 Inotuzumab was started on 11/6 which was given on Days 1, 8, 15. \par Patient tolerated second cycle without any adverse reactions. Patient for possible future Haploidentical Allogeneic stem cell transplant after discharge. \par Pre-BMT testing completed prior to discharge: Echo, MUGA, Xray sinuses, 24-Hr Urine for Creatinine. \par Patient had intermittent severe headaches on 11/3 and 11/15, repeat CTs showed nearly resolved hemorrhage. Headaches were managed with analgesics Fentanyl patch 37 mcg/hr and Oxycodone IR 15 mg q3 prn prior to discharge home. She is on ponatinib QOD b/c of increased LFT's.  \par \par Patient underwent a haploidentical PBSCT on 2/13/19 (son), hospital course as follows:\par Ms. 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 Her transplant course was complicated by shingles. In October, 2018 she relapsed (confirmed via flow cytometry). She was treated with inotuzumab x 2 cycles. Her course was complicated by subdural hemorrhage, refractory thrombocytopenia requiring HLA matched platelets, pneumonia, and coag negative staph bacteremia (treated with vancomycin). She is now admitted for a haplo-identical peripheral blood stem cell transplant from her son. \par \par Upon admission, a TLC was placed in IR. Ms. Galeas received IV hydration, pain management, antiemetics, nutritional support, antiviral / antibacterial / antifungal / PCP / GI / VOD (SOS) prophylaxis. Labs were monitored on a daily basis, and she received electrolyte repletion and transfusional support as needed. \par \par Ms. Galeas had a relatively uncomplicated transplant course. A baseline CT of the head was done on admission given her history of SDH and refractory thrombocytopenia. The baseline CT was negative. Ms. Galeas did experience pancytopenia related to the high dose chemotherapy preparative regimen. Her thrombocytopenia was refractory, and was managed by infusing 1/2 unit of platelets twice daily over 3 hours. Ms. Galeas also experienced neutropenic fevers. When she became neutropenic, she was started on prophylactic ciprofloxacin. When she developed fevers, blood and urine cultures were sent, a CXR completed and the ciprofloxacin was changed to cefepime. Her cultures and CXR remained negative. \par \par On 2/13/19, Ms. Galeas received 280ml of fresh, mobilized, haplo-identical, HPC apheresis over approximately 1 hour. Cell counts as follows: \par Total MNCs (x 10^8/kg) = 4.36 \par CD34+ cells (x 10^6/kg) = 7.78 \par Cell Viability (%) = 100% \par \par Engraftment was noted on 2/27/19. Post engraftment, the cefepime and zarxio were discontinued. Tacrolimus levels and CMV PCR were monitored twice weekly. A FISH for Y was sent to determine chimerism, with results pending. 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 She feels well overall, other than diarrhea this morning after eating late last night. She does not report any changes in ongoing cough. Denies fever, mouth sores, eye dryness, blurred vision. No CP, SOB or LE edema. She is taking Tacrolimus 2 mg BID, which she did not take today, Prednisone 40 mg QD, Valcyte 900 mg BID. She maintains proper picc maintenance, flushing daily.\par \par On 6/6/19, patient presents for a follow up visit. Today is +113 days post ALLO PBSCT. Patient states she has insomnia and fatigue. Right PICC line in place and flushed daily. Currently on tacrolimus 1 mg BID, prednisone 30 mg daily and valcyte 900 mg BID. Denies fever, chills, nausea, vomiting, diarrhea, rash, mouth sores or dysuria. Denies SOB, chest pain or B/L LE edema. Denies any pain at this time. \par \par On 6/12/19, patient presents for a follow up visit. Today is + 119 days post ALLO PBSCT. Patient states she is doing well overall and offers no acute concerns today. Right PICC line in place and flushed daily. Denies fever, chills, nausea, vomiting, diarrhea, rash, mouth sores, dysuria, cough or any active signs of bleeding. Denies SOB, chest pain or B/L LE edema. Currently on tacrolimus 1 mg BID, prednisone 20 mg daily and valcyte 900 mg BID. States insomnia has improved significantly since starting ambien. Denies any pain at this time. \par \par On 6/20/19, patient presents for a follow up visit. Today is + 127 days post ALLO PBSCT. Patient states she is doing well overall and offers no acute concerns today. Right PICC line in place and flushed daily. Denies fever, chills, nausea, vomiting, diarrhea, rash, mouth sores, dysuria or any signs of active bleeding. Denies SOB, chest pain or B/L LE edema. Currently on tacrolimus 1 mg BID, prednisone 10 mg daily and valcyte 450 mg BID. Remains compliant with post transplant diet and crowd restrictions.

## 2019-06-25 NOTE — REVIEW OF SYSTEMS
[Fatigue] : fatigue [Insomnia] : insomnia [Negative] : Endocrine [Fever] : no fever [Chills] : no chills [Muscle Weakness] : no muscle weakness [Night Sweats] : no night sweats [Dizziness] : no dizziness [FreeTextEntry9] :  occasional back pain [de-identified] : Uses a cane to ambulate [de-identified] : Insomnia has improved significantly on ambien

## 2019-06-25 NOTE — PHYSICAL EXAM
[Ambulatory and capable of all self care but unable to carry out any work activities] : Status 2- Ambulatory and capable of all self care but unable to carry out any work activities. Up and about more than 50% of waking hours [Normal] : affect appropriate [de-identified] : Right single lumen PICC, clean, dry and intact [de-identified] : stable gait

## 2019-06-26 ENCOUNTER — LABORATORY RESULT (OUTPATIENT)
Age: 59
End: 2019-06-26

## 2019-06-26 ENCOUNTER — RESULT REVIEW (OUTPATIENT)
Age: 59
End: 2019-06-26

## 2019-06-26 ENCOUNTER — APPOINTMENT (OUTPATIENT)
Dept: INFUSION THERAPY | Facility: HOSPITAL | Age: 59
End: 2019-06-26

## 2019-06-26 ENCOUNTER — APPOINTMENT (OUTPATIENT)
Dept: HEMATOLOGY ONCOLOGY | Facility: CLINIC | Age: 59
End: 2019-06-26
Payer: MEDICARE

## 2019-06-26 VITALS
TEMPERATURE: 98.6 F | HEART RATE: 96 BPM | SYSTOLIC BLOOD PRESSURE: 127 MMHG | OXYGEN SATURATION: 97 % | WEIGHT: 226.41 LBS | DIASTOLIC BLOOD PRESSURE: 85 MMHG | RESPIRATION RATE: 17 BRPM | BODY MASS INDEX: 40.36 KG/M2

## 2019-06-26 LAB
BASOPHILS # BLD AUTO: 0 K/UL — SIGNIFICANT CHANGE UP (ref 0–0.2)
BASOPHILS NFR BLD AUTO: 0.4 % — SIGNIFICANT CHANGE UP (ref 0–2)
EOSINOPHIL # BLD AUTO: 0 K/UL — SIGNIFICANT CHANGE UP (ref 0–0.5)
EOSINOPHIL NFR BLD AUTO: 0.6 % — SIGNIFICANT CHANGE UP (ref 0–6)
HCT VFR BLD CALC: 39.8 % — SIGNIFICANT CHANGE UP (ref 34.5–45)
HGB BLD-MCNC: 13.2 G/DL — SIGNIFICANT CHANGE UP (ref 11.5–15.5)
LYMPHOCYTES # BLD AUTO: 1.9 K/UL — SIGNIFICANT CHANGE UP (ref 1–3.3)
LYMPHOCYTES # BLD AUTO: 37.4 % — SIGNIFICANT CHANGE UP (ref 13–44)
MCHC RBC-ENTMCNC: 33.2 G/DL — SIGNIFICANT CHANGE UP (ref 32–36)
MCHC RBC-ENTMCNC: 34.6 PG — HIGH (ref 27–34)
MCV RBC AUTO: 104 FL — HIGH (ref 80–100)
MONOCYTES # BLD AUTO: 0.4 K/UL — SIGNIFICANT CHANGE UP (ref 0–0.9)
MONOCYTES NFR BLD AUTO: 6.8 % — SIGNIFICANT CHANGE UP (ref 2–14)
NEUTROPHILS # BLD AUTO: 2.8 K/UL — SIGNIFICANT CHANGE UP (ref 1.8–7.4)
NEUTROPHILS NFR BLD AUTO: 54.7 % — SIGNIFICANT CHANGE UP (ref 43–77)
PLATELET # BLD AUTO: 129 K/UL — LOW (ref 150–400)
RBC # BLD: 3.82 M/UL — SIGNIFICANT CHANGE UP (ref 3.8–5.2)
RBC # FLD: 14.3 % — SIGNIFICANT CHANGE UP (ref 10.3–14.5)
WBC # BLD: 5.2 K/UL — SIGNIFICANT CHANGE UP (ref 3.8–10.5)
WBC # FLD AUTO: 5.2 K/UL — SIGNIFICANT CHANGE UP (ref 3.8–10.5)

## 2019-06-26 PROCEDURE — 81206 BCR/ABL1 GENE MAJOR BP: CPT

## 2019-06-26 PROCEDURE — 88237 TISSUE CULTURE BONE MARROW: CPT

## 2019-06-26 PROCEDURE — 99215 OFFICE O/P EST HI 40 MIN: CPT

## 2019-06-26 PROCEDURE — 88271 CYTOGENETICS DNA PROBE: CPT

## 2019-06-26 PROCEDURE — 88275 CYTOGENETICS 100-300: CPT

## 2019-06-26 RX ORDER — PREDNISONE 10 MG/1
10 TABLET ORAL DAILY
Qty: 30 | Refills: 0 | Status: DISCONTINUED | COMMUNITY
Start: 2019-05-13 | End: 2019-06-26

## 2019-06-26 NOTE — ASSESSMENT
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 Relapsed ph pos ALL...s/p re induction with inotuzimab..Achieved CR. 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 \par \par 1) PH Positive ALL\par S/P Haploidentical PBSCT on 2/13/19 (son)\par Fish for Y on 6/20/19 100% donor\par Pending post transplant BMbx\par \par 2)Heme\par Counts stable, no indication for transfusion\par WBC 5.2  ANC 2.8  Hgb 13.2   Plt 129\par Continue folic acid and multivitamin\par \par 3) ID\par Continue ppx:\par Mepron 750 mg BID\par fluconazole 200 mg oral tablet -- decreased to 1 tab(s) by mouth once a day on 4/24/19\par Acyclovir 400 mg TID discontinued and switched to valcyte 450 mg BID on 3/29/19.\par Patient had mistakenly been taking acyclovir until 5/23/19- now on valcyte 900 mg BID. As of  6/12/19 valcyte decreased to 450 mg BID.\par vancomycin- d/c'd 5/10/19 with resolution of diarrhea\par \par CMV Viremia\par CMV on 5/23/19 [id number]. \par 6/12/19 CMV <50. \par 6/20/19 CMV not detected. Continue to monitor CMV PCR weekly\par \par 4) GVHD\par Skin 0 Liver 0 GI 0- overall grade 0\par No signs of acute/chronic GVHD\par Continue FK 1 mg BID- pending today's level\par Mycophenolate mofetil discontinued while inpatient\par Signs/symptoms of GVHD reviewed.\par \par Was hospitalized in March 2019 for upper GI gvhd...stage 1 overall grade 2...no gvhd noted today\par Continue prednisone taper. On 6/20/19 decrease prednisone to 10 mg every other day.\par Prednisone discontinued on 6/26/19\par \par 5)GI\par Reglan and zofran PRN\par Protonix 40 mg daily\par Actigall 300 mg oral capsule -- 1 cap(s) by mouth 2 times a day -- Indication: For VOD (SOS) prophylaxis\par \par 6) HTN\par Cozaar 100 mg oral tablet -- 1 tab(s) by mouth once a day \par amLODIPine 5 mg oral tablet -- 1 tab(s) by mouth once a day \par \par 7) Other\par Hypomagnesia- magnesium oxide 400 mg TID\par oxyCODONE 5 mg oral tablet -- 1 tab(s) by mouth every 6 hours, As Needed for pain\par levETIRAcetam 500 mg oral tablet -- 1 tab(s) by mouth 2 times a day \par Ambien 5 mg at bedtime as needed\par Continue proper PICC maintenance- flushed daily\par \par 8) Goals\par Continue post transplant diet and crowd restrictions. \par Discussed risk for infection and GVHD, signs/symptoms of infection and GVHD, post- transplant medications, anticipated duration of immunosuppression, post transplant crowd and dietary restrictions.\par Questions and concerns addressed\par Maintain weekly appointments\par Follow up appointment in one week with MD Bentley\par

## 2019-06-26 NOTE — HISTORY OF PRESENT ILLNESS
[de-identified] : Ms. 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 Her induction course was complicated by tutu fever. She received cycle 2 mid may. BM BX prior was morphologically remission but pcr was positive. Cycle 4 was completed. Prolonged course with fever, pericardial effusion, possible fungal pna. I suspect fever and effusion due to sprycel. She also had neurologic / mental status changes after MTX. She has an omaya. She had two MTX IT. She feels well today.  s/p step 1 for stem cell mobilization..in CR1..S/P auto stem cell transplant with tam 200 mg/m2 prep...\par \par 10/4/18 - 11/20/18 : 58F hx HTN, Obesity, Ph(+) ALL s/p R Hypercavd x4 cycles IT MTX x2 s/p stem cell collection w/ Step 1(HD vp16 plus arac) stem cell mobilization regime. \par FISH and PCR negative. s/p Tam-Auto on 12/16/16. Patient prior to presentation had severe burning right abdominal pain with vesicular lesions and was diagnosed with shingles and received a 10 day course of antiviral medications. \par Patient on presentation had residual neuropathic pain.  Patient presented to the ER with severe occipital parietal headaches with nausea and vomiting. \par Patient also admitted to easy bruising mouth sores and dark stools. \par CT Head was done for complaints of headache which was (+) for SDH. Neurosurgery was consulted on initial detection of SDH, recommendations to keep platelets >80,000 was made.  LGIB was attributed to profound thrombocytopenia. 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 A PICC line was placed for chemotherapy and was initiated with Inotuzamab on Day 1, 8 and 15. IVF and Allopurinol for TLS. The patient has had refractory thrombocytopenia. H L A platelets were infused when they were available. when H L A platelets were not available 1/2 unit of platelet was infused over 3 hours. \par Follow up CT Head on 10/11 was stable and the patient continued to receive 1/2 units of platelets over 3 hours every 12 hours. The patient received last dose of Inotuzamab Cycle 1 on 10/22. On 10/15 patient was febrile,  a CT of the chest/abd/pelvis was done which showed scattered patchy ground glass opacities in right upper and lower lobes, suggestive of infection, patient received a course of IV antibiotics for Pneumonia. On 11/2 Blood cultures were found to be (+) for Co-agulase (-) Staph and patient received a course of Vancomycin. \par Repeat CT Head was completed on 10/23 for follow up and showed some new bleeding into previous collection. Findings were discussed with Neuro Surgery. \par HLA platelets were administered when available for refractory thrombocytopenia. \par Cycle 2 Inotuzumab was started on 11/6 which was given on Days 1, 8, 15. \par Patient tolerated second cycle without any adverse reactions. Patient for possible future Haploidentical Allogeneic stem cell transplant after discharge. \par Pre-BMT testing completed prior to discharge: Echo, MUGA, Xray sinuses, 24-Hr Urine for Creatinine. \par Patient had intermittent severe headaches on 11/3 and 11/15, repeat CTs showed nearly resolved hemorrhage. Headaches were managed with analgesics Fentanyl patch 37 mcg/hr and Oxycodone IR 15 mg q3 prn prior to discharge home. She is on ponatinib QOD b/c of increased LFT's.  \par \par Patient underwent a haploidentical PBSCT on 2/13/19 (son), hospital course as follows:\par Ms. 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 Her transplant course was complicated by shingles. In October, 2018 she relapsed (confirmed via flow cytometry). She was treated with inotuzumab x 2 cycles. Her course was complicated by subdural hemorrhage, refractory thrombocytopenia requiring HLA matched platelets, pneumonia, and coag negative staph bacteremia (treated with vancomycin). She is now admitted for a haplo-identical peripheral blood stem cell transplant from her son. \par \par Upon admission, a TLC was placed in IR. Ms. Galeas received IV hydration, pain management, antiemetics, nutritional support, antiviral / antibacterial / antifungal / PCP / GI / VOD (SOS) prophylaxis. Labs were monitored on a daily basis, and she received electrolyte repletion and transfusional support as needed. \par \par Ms. Galeas had a relatively uncomplicated transplant course. A baseline CT of the head was done on admission given her history of SDH and refractory thrombocytopenia. The baseline CT was negative. Ms. Galeas did experience pancytopenia related to the high dose chemotherapy preparative regimen. Her thrombocytopenia was refractory, and was managed by infusing 1/2 unit of platelets twice daily over 3 hours. Ms. Galeas also experienced neutropenic fevers. When she became neutropenic, she was started on prophylactic ciprofloxacin. When she developed fevers, blood and urine cultures were sent, a CXR completed and the ciprofloxacin was changed to cefepime. Her cultures and CXR remained negative. \par \par On 2/13/19, Ms. Galeas received 280ml of fresh, mobilized, haplo-identical, HPC apheresis over approximately 1 hour. Cell counts as follows: \par Total MNCs (x 10^8/kg) = 4.36 \par CD34+ cells (x 10^6/kg) = 7.78 \par Cell Viability (%) = 100% \par \par Engraftment was noted on 2/27/19. Post engraftment, the cefepime and zarxio were discontinued. Tacrolimus levels and CMV PCR were monitored twice weekly. A FISH for Y was sent to determine chimerism, with results pending. 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 She feels well overall, other than diarrhea this morning after eating late last night. She does not report any changes in ongoing cough. Denies fever, mouth sores, eye dryness, blurred vision. No CP, SOB or LE edema. She is taking Tacrolimus 2 mg BID, which she did not take today, Prednisone 40 mg QD, Valcyte 900 mg BID. She maintains proper picc maintenance, flushing daily.\par \par On 6/6/19, patient presents for a follow up visit. Today is +113 days post ALLO PBSCT. Patient states she has insomnia and fatigue. Right PICC line in place and flushed daily. Currently on tacrolimus 1 mg BID, prednisone 30 mg daily and valcyte 900 mg BID. Denies fever, chills, nausea, vomiting, diarrhea, rash, mouth sores or dysuria. Denies SOB, chest pain or B/L LE edema. Denies any pain at this time. \par \par On 6/12/19, patient presents for a follow up visit. Today is + 119 days post ALLO PBSCT. Patient states she is doing well overall and offers no acute concerns today. Right PICC line in place and flushed daily. Denies fever, chills, nausea, vomiting, diarrhea, rash, mouth sores, dysuria, cough or any active signs of bleeding. Denies SOB, chest pain or B/L LE edema. Currently on tacrolimus 1 mg BID, prednisone 20 mg daily and valcyte 900 mg BID. States insomnia has improved significantly since starting ambien. Denies any pain at this time. \par \par On 6/20/19, patient presents for a follow up visit. Today is + 127 days post ALLO PBSCT. Patient states she is doing well overall and offers no acute concerns today. Right PICC line in place and flushed daily. Denies fever, chills, nausea, vomiting, diarrhea, rash, mouth sores, dysuria or any signs of active bleeding. Denies SOB, chest pain or B/L LE edema. Currently on tacrolimus 1 mg BID, prednisone 10 mg daily and valcyte 450 mg BID. Remains compliant with post transplant diet and crowd restrictions. \par \par On 6/26/19, patient presents for a follow up visit. Today is + 133 days post ALLO PBSCT. Patient states she is doing well overall and offers no acute concerns today. Currently on tacrolimus 1 mg BID, valcyte 450 mg BID and prednisone 10 mg every other day for the past week. Right PICC line in place and flushed daily. Lower back pain takes oxycodone prn as needed. Denies fever, chills, nausea, vomiting, diarrhea, rash, mouth sores, dysuria or any signs of active bleeding. Denies SOB, chest pain or B/L LE edema. Remains compliant with post transplant diet and crowd restrictions.

## 2019-06-26 NOTE — PHYSICAL EXAM
[Ambulatory and capable of all self care but unable to carry out any work activities] : Status 2- Ambulatory and capable of all self care but unable to carry out any work activities. Up and about more than 50% of waking hours [Normal] : affect appropriate [de-identified] : Right single lumen PICC, clean, dry and intact [de-identified] : stable gait

## 2019-06-26 NOTE — REVIEW OF SYSTEMS
[Fatigue] : fatigue [Insomnia] : insomnia [Negative] : Allergic/Immunologic [Fever] : no fever [Chills] : no chills [Night Sweats] : no night sweats [Muscle Weakness] : no muscle weakness [Dizziness] : no dizziness [FreeTextEntry9] :  occasional back pain [de-identified] : Uses a cane to ambulate [de-identified] : Insomnia has improved significantly on ambien

## 2019-06-27 ENCOUNTER — RX RENEWAL (OUTPATIENT)
Age: 59
End: 2019-06-27

## 2019-06-28 LAB — CHROM ANALY INTERPHASE BLD FISH-IMP: SIGNIFICANT CHANGE UP

## 2019-07-02 ENCOUNTER — APPOINTMENT (OUTPATIENT)
Dept: HEMATOLOGY ONCOLOGY | Facility: CLINIC | Age: 59
End: 2019-07-02
Payer: MEDICARE

## 2019-07-02 ENCOUNTER — RESULT REVIEW (OUTPATIENT)
Age: 59
End: 2019-07-02

## 2019-07-02 ENCOUNTER — LABORATORY RESULT (OUTPATIENT)
Age: 59
End: 2019-07-02

## 2019-07-02 ENCOUNTER — APPOINTMENT (OUTPATIENT)
Dept: INFUSION THERAPY | Facility: HOSPITAL | Age: 59
End: 2019-07-02

## 2019-07-02 VITALS
HEART RATE: 86 BPM | BODY MASS INDEX: 40.48 KG/M2 | RESPIRATION RATE: 17 BRPM | SYSTOLIC BLOOD PRESSURE: 130 MMHG | DIASTOLIC BLOOD PRESSURE: 92 MMHG | OXYGEN SATURATION: 98 % | TEMPERATURE: 98.8 F | WEIGHT: 227.05 LBS

## 2019-07-02 LAB
BASOPHILS # BLD AUTO: 0 K/UL — SIGNIFICANT CHANGE UP (ref 0–0.2)
BASOPHILS NFR BLD AUTO: 0.2 % — SIGNIFICANT CHANGE UP (ref 0–2)
EOSINOPHIL # BLD AUTO: 0 K/UL — SIGNIFICANT CHANGE UP (ref 0–0.5)
EOSINOPHIL NFR BLD AUTO: 0.2 % — SIGNIFICANT CHANGE UP (ref 0–6)
HCT VFR BLD CALC: 40.8 % — SIGNIFICANT CHANGE UP (ref 34.5–45)
HGB BLD-MCNC: 13.2 G/DL — SIGNIFICANT CHANGE UP (ref 11.5–15.5)
LYMPHOCYTES # BLD AUTO: 2.2 K/UL — SIGNIFICANT CHANGE UP (ref 1–3.3)
LYMPHOCYTES # BLD AUTO: 36 % — SIGNIFICANT CHANGE UP (ref 13–44)
MCHC RBC-ENTMCNC: 32.3 G/DL — SIGNIFICANT CHANGE UP (ref 32–36)
MCHC RBC-ENTMCNC: 33.4 PG — SIGNIFICANT CHANGE UP (ref 27–34)
MCV RBC AUTO: 104 FL — HIGH (ref 80–100)
MONOCYTES # BLD AUTO: 0.4 K/UL — SIGNIFICANT CHANGE UP (ref 0–0.9)
MONOCYTES NFR BLD AUTO: 6 % — SIGNIFICANT CHANGE UP (ref 2–14)
NEUTROPHILS # BLD AUTO: 3.5 K/UL — SIGNIFICANT CHANGE UP (ref 1.8–7.4)
NEUTROPHILS NFR BLD AUTO: 57.6 % — SIGNIFICANT CHANGE UP (ref 43–77)
PLATELET # BLD AUTO: 111 K/UL — LOW (ref 150–400)
RBC # BLD: 3.94 M/UL — SIGNIFICANT CHANGE UP (ref 3.8–5.2)
RBC # FLD: 14.6 % — HIGH (ref 10.3–14.5)
WBC # BLD: 6 K/UL — SIGNIFICANT CHANGE UP (ref 3.8–10.5)
WBC # FLD AUTO: 6 K/UL — SIGNIFICANT CHANGE UP (ref 3.8–10.5)

## 2019-07-02 PROCEDURE — 99215 OFFICE O/P EST HI 40 MIN: CPT

## 2019-07-02 NOTE — ADDENDUM
[FreeTextEntry1] : Documented by Toma Umaña acting as a scribe for Dr. Luke Bentley on 07/02/2019.\par \par All medical record entries made by the Scribe were at my, Dr. Luke Bentley, direction and personally dictated by me on 07/02/2019. I have reviewed the chart and agree that  the record accurately reflects my personal performance of the history, physical exam, assessment and plan. I have also personally directed, reviewed, and agree with the discharge instructions.

## 2019-07-02 NOTE — PHYSICAL EXAM
[Ambulatory and capable of all self care but unable to carry out any work activities] : Status 2- Ambulatory and capable of all self care but unable to carry out any work activities. Up and about more than 50% of waking hours [Normal] : affect appropriate [de-identified] : picc [de-identified] : stable gait

## 2019-07-02 NOTE — ASSESSMENT
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  Recent BM BX was c/w remission. Fish and pcr were negative. Step 1 complicated by hospitalization for neutropenic fevers and c-diff diarrhea 11/1-11/11. \par Was able to successfully collect her stem cell while inpt on 11/8 and 11/9. \par Now s/p Masha-Auto on 12/16/16. Hospital course complicated by pancytopenia and fungal PNA. 12/22 CT chest revealed new 1cm right lung nodule and was started on voriconazole. Also +RVP for rhinovirus/enterovirus. Demonstrated engraftment on 12/26 and d/c'ed in stable condition on 1/3/17.\par \par Now relapsed ph pos ALL......s/p re induction with inotuzimab...on Ponatinib to deepen response.....f/u fish and pcr\par Peripheral blood work stable and discussed with patient. PCR remains positive on pb...hx of subdural...no HA. \par Continued  Ponatinib 45 mg QOD  ...achieved CR.... discussed rationale, risk, benefits and side effects of therapy. Patient verbalized understanding and expressed agreement with treatment plan. 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 Engraftment was noted on 2/27/19. 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 DIscontinued as of 6/26/19.\par acyclovir- discontinued and switched to Valcyte 450mg bid on 3/29/19- pt had been mistakenly taking acyclovir until 5/23/19- now on Valcyte 900 mg BID -decreased to 450mg BID as of 6/12/19.\par oxyCODONE 5 mg oral tablet -- 1 tab(s) by mouth every 6 hours, As Needed MDD:4 tabs -- Indication: For As needed for pain \par Cozaar 100 mg oral tablet -- 1 tab(s) by mouth once a day -- Indication: For HTN \par levETIRAcetam 500 mg oral tablet -- 1 tab(s) by mouth 2 times a day -- Indication: For Anti-seizure medication \par ondansetron 8 mg oral tablet -- 1 tab(s) by mouth 3 times a day, As Needed -- Indication: For As needed for nausea \par metoclopramide 10 mg oral tablet -- 1 tab(s) by mouth 4 times a day (before meals and at bedtime), As Needed -- Indication: For As needed for nausea \par fluconazole 200 mg oral tablet -- decreased to 1 tab(s) by mouth once a day on 4/24/19 -- Indication: For Antifungal prophylaxis  \par amLODIPine 5 mg oral tablet -- 1 tab(s) by mouth once a day -- Indication: For HTN \par Actigall 300 mg oral capsule -- 1 cap(s) by mouth 2 times a day -- Indication: For VOD (SOS) prophylaxis \par vancomycin- d/c'd 5/10/19 with resolution of diarrhea\par mycophenolate mofetil- discontinued while inpatient \par magnesium oxide 400 mg (241.3 mg elemental magnesium) oral tablet -- 1 tab(s) by mouth 3 times a day (with meals) -- Indication: For Supplement \par atovaquone 750 mg/5 mL oral suspension -- 5 milliliter(s) by mouth 2 times a day -- Indication: For PCP prophylaxis \par pantoprazole 40 mg oral delayed release tablet -- 1 tab(s) by mouth once a day (before a meal) -- Indication: For GI prophylaxis \par Multiple Vitamins oral tablet -- 1 tab(s) by mouth once a day -- Indication: For Supplement \par folic acid 1 mg oral tablet -- 1 tab(s) by mouth once a day -- Indication: For Supplement. \par \par Continue medications as instructed. \par Peripheral blood work reviewed and discussed with patient.\par Labs sent today for CMP, LDH, Mg, Tacrolimus level, CMV - PCR. Chimerism 100% 6/26/19.\par Current FK dose: 2mg BID. FK level pending, will contact pt to change dose if needed.\par Maintain post-transplant diet and crowd restrictions. \par Patient advised to monitor for signs and symptoms of GVHD including but not limited to fever over 101, rash, nausea, vomiting, severe diarrhea, eye pain/dryness. - advised to contact immediately with any developing or worsening signs/symptoms. \par Continue proper picc maintenance- flushing daily. Plan to remove PICC line after maintaining appointments every 4 weeks.\par Well care stressed, questions addressed, support provided.\par RTC to f/u with QUAN Guaman 7/16 and QUNA Herron 8/1. RTC to f/u with Dr. Bentley in 6 weeks.

## 2019-07-11 ENCOUNTER — OUTPATIENT (OUTPATIENT)
Dept: OUTPATIENT SERVICES | Facility: HOSPITAL | Age: 59
LOS: 1 days | Discharge: HOME | End: 2019-07-11

## 2019-07-11 DIAGNOSIS — Z94.84 STEM CELLS TRANSPLANT STATUS: ICD-10-CM

## 2019-07-11 DIAGNOSIS — Z98.89 OTHER SPECIFIED POSTPROCEDURAL STATES: Chronic | ICD-10-CM

## 2019-07-11 DIAGNOSIS — C91.00 ACUTE LYMPHOBLASTIC LEUKEMIA NOT HAVING ACHIEVED REMISSION: ICD-10-CM

## 2019-07-11 DIAGNOSIS — D17.9 BENIGN LIPOMATOUS NEOPLASM, UNSPECIFIED: Chronic | ICD-10-CM

## 2019-07-11 DIAGNOSIS — Z41.9 ENCOUNTER FOR PROCEDURE FOR PURPOSES OTHER THAN REMEDYING HEALTH STATE, UNSPECIFIED: Chronic | ICD-10-CM

## 2019-07-16 ENCOUNTER — LABORATORY RESULT (OUTPATIENT)
Age: 59
End: 2019-07-16

## 2019-07-16 ENCOUNTER — RESULT REVIEW (OUTPATIENT)
Age: 59
End: 2019-07-16

## 2019-07-16 ENCOUNTER — APPOINTMENT (OUTPATIENT)
Dept: HEMATOLOGY ONCOLOGY | Facility: CLINIC | Age: 59
End: 2019-07-16
Payer: MEDICARE

## 2019-07-16 ENCOUNTER — APPOINTMENT (OUTPATIENT)
Dept: INFUSION THERAPY | Facility: HOSPITAL | Age: 59
End: 2019-07-16

## 2019-07-16 VITALS
TEMPERATURE: 98.3 F | BODY MASS INDEX: 40.72 KG/M2 | SYSTOLIC BLOOD PRESSURE: 127 MMHG | OXYGEN SATURATION: 98 % | HEART RATE: 90 BPM | DIASTOLIC BLOOD PRESSURE: 83 MMHG | RESPIRATION RATE: 17 BRPM | WEIGHT: 228.4 LBS

## 2019-07-16 DIAGNOSIS — Z78.9 OTHER SPECIFIED HEALTH STATUS: ICD-10-CM

## 2019-07-16 LAB
BASOPHILS # BLD AUTO: 0 K/UL — SIGNIFICANT CHANGE UP (ref 0–0.2)
BASOPHILS NFR BLD AUTO: 0.8 % — SIGNIFICANT CHANGE UP (ref 0–2)
EOSINOPHIL # BLD AUTO: 0 K/UL — SIGNIFICANT CHANGE UP (ref 0–0.5)
EOSINOPHIL NFR BLD AUTO: 0.2 % — SIGNIFICANT CHANGE UP (ref 0–6)
HCT VFR BLD CALC: 39.8 % — SIGNIFICANT CHANGE UP (ref 34.5–45)
HGB BLD-MCNC: 13.1 G/DL — SIGNIFICANT CHANGE UP (ref 11.5–15.5)
LYMPHOCYTES # BLD AUTO: 2 K/UL — SIGNIFICANT CHANGE UP (ref 1–3.3)
LYMPHOCYTES # BLD AUTO: 32 % — SIGNIFICANT CHANGE UP (ref 13–44)
MCHC RBC-ENTMCNC: 32.9 G/DL — SIGNIFICANT CHANGE UP (ref 32–36)
MCHC RBC-ENTMCNC: 34.1 PG — HIGH (ref 27–34)
MCV RBC AUTO: 104 FL — HIGH (ref 80–100)
MONOCYTES # BLD AUTO: 0.2 K/UL — SIGNIFICANT CHANGE UP (ref 0–0.9)
MONOCYTES NFR BLD AUTO: 3.6 % — SIGNIFICANT CHANGE UP (ref 2–14)
NEUTROPHILS # BLD AUTO: 3.9 K/UL — SIGNIFICANT CHANGE UP (ref 1.8–7.4)
NEUTROPHILS NFR BLD AUTO: 63.4 % — SIGNIFICANT CHANGE UP (ref 43–77)
PLATELET # BLD AUTO: 118 K/UL — LOW (ref 150–400)
RBC # BLD: 3.84 M/UL — SIGNIFICANT CHANGE UP (ref 3.8–5.2)
RBC # FLD: 13.4 % — SIGNIFICANT CHANGE UP (ref 10.3–14.5)
WBC # BLD: 6.1 K/UL — SIGNIFICANT CHANGE UP (ref 3.8–10.5)
WBC # FLD AUTO: 6.1 K/UL — SIGNIFICANT CHANGE UP (ref 3.8–10.5)

## 2019-07-16 PROCEDURE — 99214 OFFICE O/P EST MOD 30 MIN: CPT

## 2019-07-16 NOTE — REVIEW OF SYSTEMS
[Fever] : no fever [Chills] : no chills [Night Sweats] : no night sweats [Fatigue] : fatigue [Eye Pain] : no eye pain [Dry Eyes] : no dryness of the eyes [Vision Problems] : no vision problems [Dysphagia] : no dysphagia [Nosebleeds] : no nosebleeds [Odynophagia] : no odynophagia [Mucosal Pain] : no mucosal pain [Chest Pain] : no chest pain [Lower Ext Edema] : no lower extremity edema [Shortness Of Breath] : no shortness of breath [Cough] : no cough [Abdominal Pain] : no abdominal pain [Vomiting] : no vomiting [Constipation] : no constipation [Diarrhea] : no diarrhea [Dysuria] : no dysuria [Muscle Pain] : muscle pain [Muscle Weakness] : no muscle weakness [Skin Rash] : no skin rash [Dizziness] : no dizziness [Fainting] : no fainting [Easy Bleeding] : no tendency for easy bleeding [Negative] : Allergic/Immunologic [FreeTextEntry9] : chronic low back pain

## 2019-07-16 NOTE — REASON FOR VISIT
[Follow-Up Visit] : a follow-up visit for [Acute Lymphoblastic Leukemia] : acute lymphoblastic leukemia [FreeTextEntry2] :  s/p Masha-Auto on 12/16/16, s/p haplo-identical (son) PBSCT on 2/13/19

## 2019-07-16 NOTE — RESULTS/DATA
[FreeTextEntry1] : Today's CBC reviewed with pt\par Bone marrow biopsy and bone marrow aspirate 1/23/19:\par -B- lymphoblastic leukemia/lymphoma in morphologic and immunophenotypic remission.\par

## 2019-07-16 NOTE — ASSESSMENT
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 Relapsed ph pos ALL...s/p re induction with inotuzimab..Achieved CR. 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 \par \par 1) Ph+ ALL\par S/P Haploidentical PBSCT on 2/13/19 (son)\par 6/26/19 FISH for Y = 100% donor \par Pending post transplant BMbx\par \par 2) Heme\par Counts stable, no indication for transfusion\par    WBC WBC 6.1   ANC 3.9   Hgb 13.1   \par Continue folic acid and multivitamin\par \par 3) ID\par Continue ppx:\par - Mepron 750 mg BID\par - Fluconazole 200 mg oral tablet -- decreased to 1 tab(s) by mouth once a day on 4/24/19\par Post transplant restrictions reviewed \par \par CMV viremia\par Acyclovir switched to Valcyte 450mg bid on 3/29/19\par Patient had mistakenly been taking acyclovir until 5/23/19, CMV PCR improved with starting Valcyte\par 6/26/19 CMV PCR <50\par 7/2/19 CMV PCR not detected\par Continue Valcyte 450mg bid, decreased as of 6/12/19\par \par 4) GVHD\par Skin 0   Liver 0   GI 0 - overall grade 0\par No signs of acute/chronic GVHD\par Continue FK 1mg bid - pending today's level\par Signs/symptoms of GVHD reviewed\par \par Hx of upper GI GVHD\par Hospitalized in March 2019 for upper GI GVHD (stage 1, overall grade 2)\par Prednisone discontinued as of 6/26/19\par \par 5) GI\par Continue ppx:\par - Protonix 40mg daily\par - Ursodiol 300mg bid\par PRN Zofran and Reglan for nausea/vomiting \par \par 6) HTN\par Continue losartan 100mg daily\par Continue amlodipine 5mg daily\par BP remains WNL\par \par 7) Other\par Chronic back pain and sciatica - PRN Dilaudid 4mg q6hrs, may try PRN Tylenol and/or PRN ibuprofen \par Hypomagnesia - magnesium oxide 400mg tid\par Headaches - continue Keppra 500mg bid\par Insomnia - continue PRN Ambien 5mg qhs\par PICC care - proper PICC maintenance reviewed, flushed daily, dressing changed weekly by visiting RN \par \par 8) Plan/Dispo\par Pt educated regarding plan of care, all questions/concerns addressed\par Instructed to contact our office with any new/worsening symptoms\par F/u in 2wks with QUAN Herron on 8/1/19 \par

## 2019-07-16 NOTE — PHYSICAL EXAM
[Ambulatory and capable of all self care but unable to carry out any work activities] : Status 2- Ambulatory and capable of all self care but unable to carry out any work activities. Up and about more than 50% of waking hours [Normal] : affect appropriate [de-identified] : Right single lumen PICC without erythema, tenderness, drainage. No edema [de-identified] : stable gait

## 2019-07-16 NOTE — HISTORY OF PRESENT ILLNESS
[de-identified] : Ms. Galeas is a 59 year old female who was dx with ph+ ALL in april 2016...she initially presented to [facility] and was transferred to [facility] where she received R HyperCVAD. Her induction course was complicated by tutu fever. She received cycle 2 mid may. BM BX prior was morphologically remission but pcr was positive. Cycle 4 was completed. Prolonged course with fever, pericardial effusion, possible fungal pna. I suspect fever and effusion due to sprycel. She also had neurologic / mental status changes after MTX. She has an omaya. She had two MTX IT. She feels well today.  s/p step 1 for stem cell mobilization..in CR1..S/P auto stem cell transplant with tam 200 mg/m2 prep...\par \par 10/4/18 - 11/20/18 : 58F hx HTN, Obesity, Ph(+) ALL s/p R Hypercavd x4 cycles IT MTX x2 s/p stem cell collection w/ Step 1(HD vp16 plus arac) stem cell mobilization regime. \par FISH and PCR negative. s/p Tam-Auto on 12/16/16. Patient prior to presentation had severe burning right abdominal pain with vesicular lesions and was diagnosed with shingles and received a 10 day course of antiviral medications. \par Patient on presentation had residual neuropathic pain.  Patient presented to the ER with severe occipital parietal headaches with nausea and vomiting. \par Patient also admitted to easy bruising mouth sores and dark stools. \par CT Head was done for complaints of headache which was (+) for SDH. Neurosurgery was consulted on initial detection of SDH, recommendations to keep platelets >80,000 was made.  LGIB was attributed to profound thrombocytopenia. The patient was transferred to [address] [place] [address] and upon confirmation of peripheral blood flow the patient was noted to have relapsed B-ALL Ph (+). A PICC line was placed for chemotherapy and was initiated with Inotuzamab on Day 1, 8 and 15. IVF and Allopurinol for TLS. The patient has had refractory thrombocytopenia. H L A platelets were infused when they were available. when H L A platelets were not available 1/2 unit of platelet was infused over 3 hours. \par Follow up CT Head on 10/11 was stable and the patient continued to receive 1/2 units of platelets over 3 hours every 12 hours. The patient received last dose of Inotuzamab Cycle 1 on 10/22. On 10/15 patient was febrile,  a CT of the chest/abd/pelvis was done which showed scattered patchy ground glass opacities in right upper and lower lobes, suggestive of infection, patient received a course of IV antibiotics for Pneumonia. On 11/2 Blood cultures were found to be (+) for Co-agulase (-) Staph and patient received a course of Vancomycin. \par Repeat CT Head was completed on 10/23 for follow up and showed some new bleeding into previous collection. Findings were discussed with Neuro Surgery. \par HLA platelets were administered when available for refractory thrombocytopenia. \par Cycle 2 Inotuzumab was started on 11/6 which was given on Days 1, 8, 15. \par Patient tolerated second cycle without any adverse reactions. Patient for possible future Haploidentical Allogeneic stem cell transplant after discharge. \par Pre-BMT testing completed prior to discharge: Echo, MUGA, Xray sinuses, 24-Hr Urine for Creatinine. \par Patient had intermittent severe headaches on 11/3 and 11/15, repeat CTs showed nearly resolved hemorrhage. Headaches were managed with analgesics Fentanyl patch 37 mcg/hr and Oxycodone IR 15 mg q3 prn prior to discharge home. She is on ponatinib QOD b/c of increased LFT's.  \par \par Patient underwent a haploidentical PBSCT on 2/13/19 (son), hospital course as follows:\par Ms. 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 Her transplant course was complicated by shingles. In October, 2018 she relapsed (confirmed via flow cytometry). She was treated with inotuzumab x 2 cycles. Her course was complicated by subdural hemorrhage, refractory thrombocytopenia requiring HLA matched platelets, pneumonia, and coag negative staph bacteremia (treated with vancomycin). She is now admitted for a haplo-identical peripheral blood stem cell transplant from her son. \par \par Upon admission, a TLC was placed in IR. Ms. Galeas received IV hydration, pain management, antiemetics, nutritional support, antiviral / antibacterial / antifungal / PCP / GI / VOD (SOS) prophylaxis. Labs were monitored on a daily basis, and she received electrolyte repletion and transfusional support as needed. \par \par Ms. Galeas had a relatively uncomplicated transplant course. A baseline CT of the head was done on admission given her history of SDH and refractory thrombocytopenia. The baseline CT was negative. Ms. Galeas did experience pancytopenia related to the high dose chemotherapy preparative regimen. Her thrombocytopenia was refractory, and was managed by infusing 1/2 unit of platelets twice daily over 3 hours. Ms. Galeas also experienced neutropenic fevers. When she became neutropenic, she was started on prophylactic ciprofloxacin. When she developed fevers, blood and urine cultures were sent, a CXR completed and the ciprofloxacin was changed to cefepime. Her cultures and CXR remained negative. \par \par On 2/13/19, Ms. Galeas received 280ml of fresh, mobilized, haplo-identical, HPC apheresis over approximately 1 hour. Cell counts as follows: \par Total MNCs (x 10^8/kg) = 4.36 \par CD34+ cells (x 10^6/kg) = 7.78 \par Cell Viability (%) = 100% \par \par Engraftment was noted on 2/27/19. Post engraftment, the cefepime and zarxio were discontinued. Tacrolimus levels and CMV PCR were monitored twice weekly. A FISH for Y was sent to determine chimerism, with results pending. \par Hospitalized in march for several weeks for  GVHD of gut ...upper...stage 1 ..overall grade 2 [de-identified] : On 7/16/19 visit, day +153 of SCT today. Continues to feel well. Reports adequate PO intake and hydration. Only complaint is low back pain and sciatica which she had even prior to transplant. Compliant with post transplant meds and restrictions. Currently taking FK 1mg bid which she did not take prior to lab draw today. Denies fever, chills, headache, dizziness, blurred vision, mucositis/odynophagia, chest pain, SOB, cough, nausea/vomiting, diarrhea/constipation, abdominal pain, dysuria, LE edema, rash, bleeding

## 2019-07-22 ENCOUNTER — MEDICATION RENEWAL (OUTPATIENT)
Age: 59
End: 2019-07-22

## 2019-08-01 ENCOUNTER — APPOINTMENT (OUTPATIENT)
Dept: INFUSION THERAPY | Facility: HOSPITAL | Age: 59
End: 2019-08-01

## 2019-08-01 ENCOUNTER — LABORATORY RESULT (OUTPATIENT)
Age: 59
End: 2019-08-01

## 2019-08-01 ENCOUNTER — RESULT REVIEW (OUTPATIENT)
Age: 59
End: 2019-08-01

## 2019-08-01 ENCOUNTER — APPOINTMENT (OUTPATIENT)
Dept: HEMATOLOGY ONCOLOGY | Facility: CLINIC | Age: 59
End: 2019-08-01
Payer: MEDICARE

## 2019-08-01 VITALS
SYSTOLIC BLOOD PRESSURE: 121 MMHG | DIASTOLIC BLOOD PRESSURE: 88 MMHG | TEMPERATURE: 98.2 F | RESPIRATION RATE: 16 BRPM | HEART RATE: 86 BPM | OXYGEN SATURATION: 99 % | BODY MASS INDEX: 41.62 KG/M2 | WEIGHT: 233.47 LBS

## 2019-08-01 LAB
BASOPHILS # BLD AUTO: 0.1 K/UL — SIGNIFICANT CHANGE UP (ref 0–0.2)
BASOPHILS NFR BLD AUTO: 0.9 % — SIGNIFICANT CHANGE UP (ref 0–2)
EOSINOPHIL # BLD AUTO: 0.1 K/UL — SIGNIFICANT CHANGE UP (ref 0–0.5)
EOSINOPHIL NFR BLD AUTO: 0.9 % — SIGNIFICANT CHANGE UP (ref 0–6)
HCT VFR BLD CALC: 38.8 % — SIGNIFICANT CHANGE UP (ref 34.5–45)
HGB BLD-MCNC: 12.6 G/DL — SIGNIFICANT CHANGE UP (ref 11.5–15.5)
LYMPHOCYTES # BLD AUTO: 2.8 K/UL — SIGNIFICANT CHANGE UP (ref 1–3.3)
LYMPHOCYTES # BLD AUTO: 41.3 % — SIGNIFICANT CHANGE UP (ref 13–44)
MCHC RBC-ENTMCNC: 32.5 G/DL — SIGNIFICANT CHANGE UP (ref 32–36)
MCHC RBC-ENTMCNC: 33.5 PG — SIGNIFICANT CHANGE UP (ref 27–34)
MCV RBC AUTO: 103 FL — HIGH (ref 80–100)
MONOCYTES # BLD AUTO: 0.2 K/UL — SIGNIFICANT CHANGE UP (ref 0–0.9)
MONOCYTES NFR BLD AUTO: 3.1 % — SIGNIFICANT CHANGE UP (ref 2–14)
NEUTROPHILS # BLD AUTO: 3.6 K/UL — SIGNIFICANT CHANGE UP (ref 1.8–7.4)
NEUTROPHILS NFR BLD AUTO: 53.8 % — SIGNIFICANT CHANGE UP (ref 43–77)
PLATELET # BLD AUTO: 112 K/UL — LOW (ref 150–400)
RBC # BLD: 3.76 M/UL — LOW (ref 3.8–5.2)
RBC # FLD: 12.9 % — SIGNIFICANT CHANGE UP (ref 10.3–14.5)
WBC # BLD: 6.7 K/UL — SIGNIFICANT CHANGE UP (ref 3.8–10.5)
WBC # FLD AUTO: 6.7 K/UL — SIGNIFICANT CHANGE UP (ref 3.8–10.5)

## 2019-08-01 PROCEDURE — 99214 OFFICE O/P EST MOD 30 MIN: CPT

## 2019-08-07 NOTE — ASSESSMENT
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 Relapsed ph pos ALL...s/p re induction with inotuzimab..Achieved CR. 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 \par \par 1) Ph+ ALL\par S/P Haploidentical PBSCT on 2/13/19 (son)\par 7/16/19 FISH for Y = 100% donor \par Pending post transplant BMbx\par \par 2) Heme\par Counts stable, no indication for transfusion\par    WBC 6.7   ANC 3.6   Hgb 12.6   \par Continue folic acid and multivitamin\par \par 3) ID\par Continue ppx:\par - Mepron 750 mg BID\par - Fluconazole 200 mg oral tablet -- decreased to 1 tab(s) by mouth once a day on 4/24/19\par Post transplant restrictions reviewed \par \par CMV viremia\par Acyclovir switched to Valcyte 450mg bid on 3/29/19\par Patient had mistakenly been taking acyclovir until 5/23/19, CMV PCR improved with starting Valcyte\par 6/26/19 CMV PCR <50\par 7/16/19 CMV PCR not detected\par Continue Valcyte 450mg bid, decreased as of 6/12/19\par \par 4) GVHD\par Skin 0   Liver 0   GI 0 - overall grade 0\par No signs of acute/chronic GVHD\par Continue FK 1mg bid - pending today's level\par Signs/symptoms of GVHD reviewed\par \par Hx of upper GI GVHD\par Hospitalized in March 2019 for upper GI GVHD (stage 1, overall grade 2)\par Prednisone discontinued as of 6/26/19\par \par 5) GI\par Continue ppx:\par - Protonix 40 mg daily\par - Ursodiol 30 0mg bid\par PRN Zofran and Reglan for nausea/vomiting \par \par 6) HTN\par Continue losartan 100 mg daily\par Continue amlodipine 5mg daily\par BP remains WNL\par \par 7) Other\par Chronic back pain and sciatica - PRN oxycodone \par Hypomagnesia - magnesium oxide 400 mg tid\par Headaches - continue Keppra 500 mg bid\par Insomnia - continue PRN Ambien 5 mg q hs\par PICC care - proper PICC maintenance reviewed, flushed daily, dressing changed weekly by visiting RN \par \par 8) Plan/Dispo\par Pt educated regarding plan of care, all questions/concerns addressed\par Instructed to contact our office with any new/worsening symptoms\par F/u in 2 weeks with MD Bentlye on 8/13/19\par

## 2019-08-07 NOTE — REVIEW OF SYSTEMS
[Fatigue] : fatigue [Muscle Pain] : muscle pain [Negative] : Allergic/Immunologic [Fever] : no fever [Chills] : no chills [Night Sweats] : no night sweats [Eye Pain] : no eye pain [Dry Eyes] : no dryness of the eyes [Vision Problems] : no vision problems [Dysphagia] : no dysphagia [Nosebleeds] : no nosebleeds [Mucosal Pain] : no mucosal pain [Odynophagia] : no odynophagia [Lower Ext Edema] : no lower extremity edema [Chest Pain] : no chest pain [Cough] : no cough [Shortness Of Breath] : no shortness of breath [Abdominal Pain] : no abdominal pain [Constipation] : no constipation [Vomiting] : no vomiting [Dysuria] : no dysuria [Diarrhea] : no diarrhea [Muscle Weakness] : no muscle weakness [Skin Rash] : no skin rash [Dizziness] : no dizziness [Fainting] : no fainting [FreeTextEntry9] : chronic low back pain [Easy Bleeding] : no tendency for easy bleeding

## 2019-08-07 NOTE — HISTORY OF PRESENT ILLNESS
[de-identified] : On 7/16/19 visit, day +153 of SCT today. Continues to feel well. Reports adequate PO intake and hydration. Only complaint is low back pain and sciatica which she had even prior to transplant. Compliant with post transplant meds and restrictions. Currently taking FK 1mg bid which she did not take prior to lab draw today. Denies fever, chills, headache, dizziness, blurred vision, mucositis/odynophagia, chest pain, SOB, cough, nausea/vomiting, diarrhea/constipation, abdominal pain, dysuria, LE edema, rash, bleeding\par \par On 8/1/19, patient presents for a follow up visit. Overall doing well. States has generalized upper body aches and lower back pain. Denies fever, chills, nausea, vomiting, diarrhea, rash, mouth sores, dysuria or any active signs of bleeding. Denies SOB, chest pain or B/L LE edema. Remains compliant with post transplant diet and crowd restrictions. Remains compliant with post transplant medications. Currently on FK 1 mg BID and did not take prior to blood work this morning.  [de-identified] : Ms. 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 Her induction course was complicated by tutu fever. She received cycle 2 mid may. BM BX prior was morphologically remission but pcr was positive. Cycle 4 was completed. Prolonged course with fever, pericardial effusion, possible fungal pna. I suspect fever and effusion due to sprycel. She also had neurologic / mental status changes after MTX. She has an omaya. She had two MTX IT. She feels well today.  s/p step 1 for stem cell mobilization..in CR1..S/P auto stem cell transplant with tam 200 mg/m2 prep...\par \par 10/4/18 - 11/20/18 : 58F hx HTN, Obesity, Ph(+) ALL s/p R Hypercavd x4 cycles IT MTX x2 s/p stem cell collection w/ Step 1(HD vp16 plus arac) stem cell mobilization regime. \par FISH and PCR negative. s/p Tam-Auto on 12/16/16. Patient prior to presentation had severe burning right abdominal pain with vesicular lesions and was diagnosed with shingles and received a 10 day course of antiviral medications. \par Patient on presentation had residual neuropathic pain.  Patient presented to the ER with severe occipital parietal headaches with nausea and vomiting. \par Patient also admitted to easy bruising mouth sores and dark stools. \par CT Head was done for complaints of headache which was (+) for SDH. Neurosurgery was consulted on initial detection of SDH, recommendations to keep platelets >80,000 was made.  LGIB was attributed to profound thrombocytopenia. 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 A PICC line was placed for chemotherapy and was initiated with Inotuzamab on Day 1, 8 and 15. IVF and Allopurinol for TLS. The patient has had refractory thrombocytopenia. H L A platelets were infused when they were available. when H L A platelets were not available 1/2 unit of platelet was infused over 3 hours. \par Follow up CT Head on 10/11 was stable and the patient continued to receive 1/2 units of platelets over 3 hours every 12 hours. The patient received last dose of Inotuzamab Cycle 1 on 10/22. On 10/15 patient was febrile,  a CT of the chest/abd/pelvis was done which showed scattered patchy ground glass opacities in right upper and lower lobes, suggestive of infection, patient received a course of IV antibiotics for Pneumonia. On 11/2 Blood cultures were found to be (+) for Co-agulase (-) Staph and patient received a course of Vancomycin. \par Repeat CT Head was completed on 10/23 for follow up and showed some new bleeding into previous collection. Findings were discussed with Neuro Surgery. \par HLA platelets were administered when available for refractory thrombocytopenia. \par Cycle 2 Inotuzumab was started on 11/6 which was given on Days 1, 8, 15. \par Patient tolerated second cycle without any adverse reactions. Patient for possible future Haploidentical Allogeneic stem cell transplant after discharge. \par Pre-BMT testing completed prior to discharge: Echo, MUGA, Xray sinuses, 24-Hr Urine for Creatinine. \par Patient had intermittent severe headaches on 11/3 and 11/15, repeat CTs showed nearly resolved hemorrhage. Headaches were managed with analgesics Fentanyl patch 37 mcg/hr and Oxycodone IR 15 mg q3 prn prior to discharge home. She is on ponatinib QOD b/c of increased LFT's.  \par \par Patient underwent a haploidentical PBSCT on 2/13/19 (son), hospital course as follows:\par Ms. 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 Her transplant course was complicated by shingles. In October, 2018 she relapsed (confirmed via flow cytometry). She was treated with inotuzumab x 2 cycles. Her course was complicated by subdural hemorrhage, refractory thrombocytopenia requiring HLA matched platelets, pneumonia, and coag negative staph bacteremia (treated with vancomycin). She is now admitted for a haplo-identical peripheral blood stem cell transplant from her son. \par \par Upon admission, a TLC was placed in IR. Ms. Galeas received IV hydration, pain management, antiemetics, nutritional support, antiviral / antibacterial / antifungal / PCP / GI / VOD (SOS) prophylaxis. Labs were monitored on a daily basis, and she received electrolyte repletion and transfusional support as needed. \par \par Ms. Galeas had a relatively uncomplicated transplant course. A baseline CT of the head was done on admission given her history of SDH and refractory thrombocytopenia. The baseline CT was negative. Ms. Galaes did experience pancytopenia related to the high dose chemotherapy preparative regimen. Her thrombocytopenia was refractory, and was managed by infusing 1/2 unit of platelets twice daily over 3 hours. Ms. Galeas also experienced neutropenic fevers. When she became neutropenic, she was started on prophylactic ciprofloxacin. When she developed fevers, blood and urine cultures were sent, a CXR completed and the ciprofloxacin was changed to cefepime. Her cultures and CXR remained negative. \par \par On 2/13/19, Ms. Galeas received 280ml of fresh, mobilized, haplo-identical, HPC apheresis over approximately 1 hour. Cell counts as follows: \par Total MNCs (x 10^8/kg) = 4.36 \par CD34+ cells (x 10^6/kg) = 7.78 \par Cell Viability (%) = 100% \par \par Engraftment was noted on 2/27/19. Post engraftment, the cefepime and zarxio were discontinued. Tacrolimus levels and CMV PCR were monitored twice weekly. A FISH for Y was sent to determine chimerism, with results pending. 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

## 2019-08-09 ENCOUNTER — OUTPATIENT (OUTPATIENT)
Dept: OUTPATIENT SERVICES | Facility: HOSPITAL | Age: 59
LOS: 1 days | Discharge: HOME | End: 2019-08-09

## 2019-08-09 DIAGNOSIS — C91.00 ACUTE LYMPHOBLASTIC LEUKEMIA NOT HAVING ACHIEVED REMISSION: ICD-10-CM

## 2019-08-09 DIAGNOSIS — Z41.9 ENCOUNTER FOR PROCEDURE FOR PURPOSES OTHER THAN REMEDYING HEALTH STATE, UNSPECIFIED: Chronic | ICD-10-CM

## 2019-08-09 DIAGNOSIS — Z98.89 OTHER SPECIFIED POSTPROCEDURAL STATES: Chronic | ICD-10-CM

## 2019-08-09 DIAGNOSIS — Z94.84 STEM CELLS TRANSPLANT STATUS: ICD-10-CM

## 2019-08-09 DIAGNOSIS — D17.9 BENIGN LIPOMATOUS NEOPLASM, UNSPECIFIED: Chronic | ICD-10-CM

## 2019-08-13 ENCOUNTER — APPOINTMENT (OUTPATIENT)
Dept: INFUSION THERAPY | Facility: HOSPITAL | Age: 59
End: 2019-08-13

## 2019-08-13 ENCOUNTER — RESULT REVIEW (OUTPATIENT)
Age: 59
End: 2019-08-13

## 2019-08-13 ENCOUNTER — OUTPATIENT (OUTPATIENT)
Dept: OUTPATIENT SERVICES | Facility: HOSPITAL | Age: 59
LOS: 1 days | End: 2019-08-13
Payer: MEDICARE

## 2019-08-13 ENCOUNTER — LABORATORY RESULT (OUTPATIENT)
Age: 59
End: 2019-08-13

## 2019-08-13 ENCOUNTER — APPOINTMENT (OUTPATIENT)
Dept: HEMATOLOGY ONCOLOGY | Facility: CLINIC | Age: 59
End: 2019-08-13
Payer: MEDICARE

## 2019-08-13 VITALS
SYSTOLIC BLOOD PRESSURE: 115 MMHG | HEART RATE: 91 BPM | WEIGHT: 231.46 LBS | TEMPERATURE: 98.8 F | OXYGEN SATURATION: 97 % | BODY MASS INDEX: 41.27 KG/M2 | RESPIRATION RATE: 16 BRPM | DIASTOLIC BLOOD PRESSURE: 85 MMHG

## 2019-08-13 DIAGNOSIS — C91.00 ACUTE LYMPHOBLASTIC LEUKEMIA NOT HAVING ACHIEVED REMISSION: ICD-10-CM

## 2019-08-13 DIAGNOSIS — Z98.89 OTHER SPECIFIED POSTPROCEDURAL STATES: Chronic | ICD-10-CM

## 2019-08-13 DIAGNOSIS — D17.9 BENIGN LIPOMATOUS NEOPLASM, UNSPECIFIED: Chronic | ICD-10-CM

## 2019-08-13 DIAGNOSIS — Z41.9 ENCOUNTER FOR PROCEDURE FOR PURPOSES OTHER THAN REMEDYING HEALTH STATE, UNSPECIFIED: Chronic | ICD-10-CM

## 2019-08-13 LAB
BASOPHILS # BLD AUTO: 0 K/UL — SIGNIFICANT CHANGE UP (ref 0–0.2)
BASOPHILS NFR BLD AUTO: 0.3 % — SIGNIFICANT CHANGE UP (ref 0–2)
EOSINOPHIL # BLD AUTO: 0.1 K/UL — SIGNIFICANT CHANGE UP (ref 0–0.5)
EOSINOPHIL NFR BLD AUTO: 0.7 % — SIGNIFICANT CHANGE UP (ref 0–6)
HCT VFR BLD CALC: 38.4 % — SIGNIFICANT CHANGE UP (ref 34.5–45)
HGB BLD-MCNC: 12.3 G/DL — SIGNIFICANT CHANGE UP (ref 11.5–15.5)
LYMPHOCYTES # BLD AUTO: 4.2 K/UL — HIGH (ref 1–3.3)
LYMPHOCYTES # BLD AUTO: 55.9 % — HIGH (ref 13–44)
MCHC RBC-ENTMCNC: 32.1 G/DL — SIGNIFICANT CHANGE UP (ref 32–36)
MCHC RBC-ENTMCNC: 33.1 PG — SIGNIFICANT CHANGE UP (ref 27–34)
MCV RBC AUTO: 103 FL — HIGH (ref 80–100)
MONOCYTES # BLD AUTO: 0.3 K/UL — SIGNIFICANT CHANGE UP (ref 0–0.9)
MONOCYTES NFR BLD AUTO: 4.5 % — SIGNIFICANT CHANGE UP (ref 2–14)
NEUTROPHILS # BLD AUTO: 2.9 K/UL — SIGNIFICANT CHANGE UP (ref 1.8–7.4)
NEUTROPHILS NFR BLD AUTO: 38.5 % — LOW (ref 43–77)
PLATELET # BLD AUTO: 139 K/UL — LOW (ref 150–400)
RBC # BLD: 3.73 M/UL — LOW (ref 3.8–5.2)
RBC # FLD: 13.3 % — SIGNIFICANT CHANGE UP (ref 10.3–14.5)
WBC # BLD: 7.5 K/UL — SIGNIFICANT CHANGE UP (ref 3.8–10.5)
WBC # FLD AUTO: 7.5 K/UL — SIGNIFICANT CHANGE UP (ref 3.8–10.5)

## 2019-08-13 PROCEDURE — 99215 OFFICE O/P EST HI 40 MIN: CPT

## 2019-08-13 PROCEDURE — 88291 CYTO/MOLECULAR REPORT: CPT

## 2019-08-14 LAB
ALBUMIN SERPL ELPH-MCNC: 3.8 G/DL
ALP BLD-CCNC: 184 U/L
ALT SERPL-CCNC: 35 U/L
ANION GAP SERPL CALC-SCNC: 11 MMOL/L
AST SERPL-CCNC: 27 U/L
BILIRUB SERPL-MCNC: 0.4 MG/DL
BUN SERPL-MCNC: 21 MG/DL
CALCIUM SERPL-MCNC: 10.9 MG/DL
CHLORIDE SERPL-SCNC: 112 MMOL/L
CMV DNA SPEC QL NAA+PROBE: NOT DETECTED
CMVPCR LOG: NOT DETECTED LOGIU/ML
CO2 SERPL-SCNC: 20 MMOL/L
CREAT SERPL-MCNC: 1.31 MG/DL
GLUCOSE SERPL-MCNC: 108 MG/DL
LDH SERPL-CCNC: 230 U/L
MAGNESIUM SERPL-MCNC: 1.8 MG/DL
POTASSIUM SERPL-SCNC: 5.1 MMOL/L
PROT SERPL-MCNC: 6.4 G/DL
SODIUM SERPL-SCNC: 143 MMOL/L
TACROLIMUS SERPL-MCNC: 8.4 NG/ML

## 2019-08-14 NOTE — REVIEW OF SYSTEMS
[Fatigue] : fatigue [Negative] : Allergic/Immunologic [Muscle Weakness] : no muscle weakness [FreeTextEntry2] : illness, chills, fever-resolved [FreeTextEntry5] : PICC line has been removed [FreeTextEntry9] : bone pain

## 2019-08-14 NOTE — HISTORY OF PRESENT ILLNESS
[de-identified] : Ms. 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 Her induction course was complicated by tutu fever. She received cycle 2 mid may. BM BX prior was morphologically remission but pcr was positive. Cycle 4 was completed. Prolonged course with fever, pericardial effusion, possible fungal pna. I suspect fever and effusion due to sprycel. She also had neurologic / mental status changes after MTX. She has an omaya. She had two MTX IT. She feels well today.  s/p step 1 for stem cell mobilization..in CR1..S/P auto stem cell transplant with tam 200 mg/m2 prep...\par \par 10/4/18 - 11/20/18 : 58F hx HTN, Obesity, Ph(+) ALL s/p R Hypercavd x4 cycles IT MTX x2 s/p stem cell collection w/ Step 1(HD vp16 plus arac) stem cell mobilization regime. \par FISH and PCR negative. s/p Tam-Auto on 12/16/16. Patient prior to presentation had severe burning right abdominal pain with vesicular lesions and was diagnosed with shingles and received a 10 day course of antiviral medications. \par Patient on presentation had residual neuropathic pain.  Patient presented to the ER with severe occipital parietal headaches with nausea and vomiting. \par Patient also admitted to easy bruising mouth sores and dark stools. \par CT Head was done for complaints of headache which was (+) for SDH. Neurosurgery was consulted on initial detection of SDH, recommendations to keep platelets >80,000 was made.  LGIB was attributed to profound thrombocytopenia. 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 A PICC line was placed for chemotherapy and was initiated with Inotuzamab on Day 1, 8 and 15. IVF and Allopurinol for TLS. The patient has had refractory thrombocytopenia. H L A platelets were infused when they were available. when H L A platelets were not available 1/2 unit of platelet was infused over 3 hours. \par Follow up CT Head on 10/11 was stable and the patient continued to receive 1/2 units of platelets over 3 hours every 12 hours. The patient received last dose of Inotuzamab Cycle 1 on 10/22. On 10/15 patient was febrile,  a CT of the chest/abd/pelvis was done which showed scattered patchy ground glass opacities in right upper and lower lobes, suggestive of infection, patient received a course of IV antibiotics for Pneumonia. On 11/2 Blood cultures were found to be (+) for Co-agulase (-) Staph and patient received a course of Vancomycin. \par Repeat CT Head was completed on 10/23 for follow up and showed some new bleeding into previous collection. Findings were discussed with Neuro Surgery. \par HLA platelets were administered when available for refractory thrombocytopenia. \par Cycle 2 Inotuzumab was started on 11/6 which was given on Days 1, 8, 15. \par Patient tolerated second cycle without any adverse reactions. Patient for possible future Haploidentical Allogeneic stem cell transplant after discharge. \par Pre-BMT testing completed prior to discharge: Echo, MUGA, Xray sinuses, 24-Hr Urine for Creatinine. \par Patient had intermittent severe headaches on 11/3 and 11/15, repeat CTs showed nearly resolved hemorrhage. Headaches were managed with analgesics Fentanyl patch 37 mcg/hr and Oxycodone IR 15 mg q3 prn prior to discharge home. She is on ponatinib QOD b/c of increased LFT's.  \par \par Patient underwent a haploidentical PBSCT on 2/13/19 (son), hospital course as follows:\par Ms. 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 Her transplant course was complicated by shingles. In October, 2018 she relapsed (confirmed via flow cytometry). She was treated with inotuzumab x 2 cycles. Her course was complicated by subdural hemorrhage, refractory thrombocytopenia requiring HLA matched platelets, pneumonia, and coag negative staph bacteremia (treated with vancomycin). She is now admitted for a haplo-identical peripheral blood stem cell transplant from her son. \par \par Upon admission, a TLC was placed in IR. Ms. Galeas received IV hydration, pain management, antiemetics, nutritional support, antiviral / antibacterial / antifungal / PCP / GI / VOD (SOS) prophylaxis. Labs were monitored on a daily basis, and she received electrolyte repletion and transfusional support as needed. \par \par Ms. Galeas had a relatively uncomplicated transplant course. A baseline CT of the head was done on admission given her history of SDH and refractory thrombocytopenia. The baseline CT was negative. Ms. Galeas did experience pancytopenia related to the high dose chemotherapy preparative regimen. Her thrombocytopenia was refractory, and was managed by infusing 1/2 unit of platelets twice daily over 3 hours. Ms. Galeas also experienced neutropenic fevers. When she became neutropenic, she was started on prophylactic ciprofloxacin. When she developed fevers, blood and urine cultures were sent, a CXR completed and the ciprofloxacin was changed to cefepime. Her cultures and CXR remained negative. \par \par On 2/13/19, Ms. Galeas received 280ml of fresh, mobilized, haplo-identical, HPC apheresis over approximately 1 hour. Cell counts as follows: \par Total MNCs (x 10^8/kg) = 4.36 \par CD34+ cells (x 10^6/kg) = 7.78 \par Cell Viability (%) = 100% \par \par Engraftment was noted on 2/27/19. Post engraftment, the cefepime and zarxio were discontinued. Tacrolimus levels and CMV PCR were monitored twice weekly. A FISH for Y was sent to determine chimerism, with results pending. 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 Patient states she is doing well overall. She reports a recent illness with chills and fever, which has since resolved. She c/o continued bone pain. Currently on tacrolimus 1mg BID and valcyte 450 mg BID. Prednisone was discontinued on 6/26/19. Right PICC line has been removed. Denies fever, chills, nausea, vomiting, diarrhea, rash, mouth sores, dysuria or any signs of active bleeding. Denies SOB, chest pain or B/L LE edema. Remains compliant with post transplant diet and crowd restrictions.

## 2019-08-14 NOTE — ASSESSMENT
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  Recent BM BX was c/w remission. Fish and pcr were negative. Step 1 complicated by hospitalization for neutropenic fevers and c-diff diarrhea 11/1-11/11. \par Was able to successfully collect her stem cell while inpt on 11/8 and 11/9. \par Now s/p Masha-Auto on 12/16/16. Hospital course complicated by pancytopenia and fungal PNA. 12/22 CT chest revealed new 1cm right lung nodule and was started on voriconazole. Also +RVP for rhinovirus/enterovirus. Demonstrated engraftment on 12/26 and d/c'ed in stable condition on 1/3/17.\par \par Now relapsed ph pos ALL......s/p re induction with inotuzimab...on Ponatinib to deepen response.....f/u fish and pcr\par Peripheral blood work stable and discussed with patient. PCR remains positive on pb...hx of subdural...no HA. \par Continued  Ponatinib 45 mg QOD  ...achieved CR.... discussed rationale, risk, benefits and side effects of therapy. Patient verbalized understanding and expressed agreement with treatment plan. 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 Engraftment was noted on 2/27/19. 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 DIscontinued as of 6/26/19.\par acyclovir- discontinued and switched to Valcyte 450mg bid on 3/29/19- pt had been mistakenly taking acyclovir until 5/23/19- now on Valcyte 900 mg BID -decreased to 450mg BID as of 6/12/19.\par oxyCODONE 5 mg oral tablet -- 1 tab(s) by mouth every 6 hours, As Needed MDD:4 tabs -- Indication: For As needed for pain \par Cozaar 100 mg oral tablet -- 1 tab(s) by mouth once a day -- Indication: For HTN \par levETIRAcetam 500 mg oral tablet -- 1 tab(s) by mouth 2 times a day -- Indication: For Anti-seizure medication \par ondansetron 8 mg oral tablet -- 1 tab(s) by mouth 3 times a day, As Needed -- Indication: For As needed for nausea \par metoclopramide 10 mg oral tablet -- 1 tab(s) by mouth 4 times a day (before meals and at bedtime), As Needed -- Indication: For As needed for nausea \par fluconazole 200 mg oral tablet -- decreased to 1 tab(s) by mouth once a day on 4/24/19 -- Indication: For Antifungal prophylaxis  \par amLODIPine 5 mg oral tablet -- 1 tab(s) by mouth once a day -- Indication: For HTN \par Actigall 300 mg oral capsule -- 1 cap(s) by mouth 2 times a day -- Indication: For VOD (SOS) prophylaxis \par vancomycin- d/c'd 5/10/19 with resolution of diarrhea\par mycophenolate mofetil- discontinued while inpatient \par magnesium oxide 400 mg (241.3 mg elemental magnesium) oral tablet -- 1 tab(s) by mouth 3 times a day (with meals) -- Indication: For Supplement \par atovaquone 750 mg/5 mL oral suspension -- 5 milliliter(s) by mouth 2 times a day -- Indication: For PCP prophylaxis \par pantoprazole 40 mg oral delayed release tablet -- 1 tab(s) by mouth once a day (before a meal) -- Indication: For GI prophylaxis \par Multiple Vitamins oral tablet -- 1 tab(s) by mouth once a day -- Indication: For Supplement \par folic acid 1 mg oral tablet -- 1 tab(s) by mouth once a day -- Indication: For Supplement. \par \par Continue medications as instructed. \par Peripheral blood work reviewed and discussed with patient.\par Labs sent today for CMP, LDH, Mg, CMV-PCR, Tacrolimus level. Chimerism 100% 7/16/19.\par Current FK dose: 1mg BID. FK level pending, will contact pt to change dose if needed. Plan to taper FK in 2 weeks. 1 mg in am and 0.5 mg in pm\par Maintain post-transplant diet and crowd restrictions. \par Patient advised to monitor for signs and symptoms of GVHD including but not limited to fever over 101, rash, nausea, vomiting, severe diarrhea, eye pain/dryness. - advised to contact immediately with any developing or worsening signs/symptoms. \par Continue proper picc maintenance- flushing daily. Plan to remove PICC line after maintaining appointments every 4 weeks. PICC line has been removed.\par Well care stressed, questions addressed, support provided.\par RTC to f/u with QUAN Herron in 2 weeks. RTC to f/u with Dr. Bentley in 4 weeks.

## 2019-08-14 NOTE — PHYSICAL EXAM
[Ambulatory and capable of all self care but unable to carry out any work activities] : Status 2- Ambulatory and capable of all self care but unable to carry out any work activities. Up and about more than 50% of waking hours [Normal] : grossly intact [de-identified] : picc line has been removed [de-identified] : stable gait

## 2019-08-14 NOTE — ADDENDUM
[FreeTextEntry1] : Documented by Toma Umaña acting as a scribe for Dr. Luke Bentley on 08/13/2019.\par \par All medical record entries made by the Scribe were at my, Dr. Luke Bentley, direction and personally dictated by me on 08/13/2019. I have reviewed the chart and agree that  the record accurately reflects my personal performance of the history, physical exam, assessment and plan. I have also personally directed, reviewed, and agree with the discharge instructions.\par

## 2019-08-15 LAB — CHROM ANALY INTERPHASE BLD FISH-IMP: SIGNIFICANT CHANGE UP

## 2019-08-29 ENCOUNTER — APPOINTMENT (OUTPATIENT)
Dept: HEMATOLOGY ONCOLOGY | Facility: CLINIC | Age: 59
End: 2019-08-29
Payer: MEDICARE

## 2019-08-29 ENCOUNTER — RESULT REVIEW (OUTPATIENT)
Age: 59
End: 2019-08-29

## 2019-08-29 ENCOUNTER — APPOINTMENT (OUTPATIENT)
Dept: INFUSION THERAPY | Facility: HOSPITAL | Age: 59
End: 2019-08-29

## 2019-08-29 VITALS
HEART RATE: 88 BPM | BODY MASS INDEX: 39.7 KG/M2 | OXYGEN SATURATION: 100 % | DIASTOLIC BLOOD PRESSURE: 77 MMHG | WEIGHT: 222.64 LBS | TEMPERATURE: 98.2 F | SYSTOLIC BLOOD PRESSURE: 112 MMHG | RESPIRATION RATE: 16 BRPM

## 2019-08-29 LAB
ALBUMIN SERPL ELPH-MCNC: 4.1 G/DL
ALP BLD-CCNC: 145 U/L
ALT SERPL-CCNC: 19 U/L
ANION GAP SERPL CALC-SCNC: 13 MMOL/L
AST SERPL-CCNC: 20 U/L
BASOPHILS # BLD AUTO: 0.1 K/UL — SIGNIFICANT CHANGE UP (ref 0–0.2)
BASOPHILS NFR BLD AUTO: 1.1 % — SIGNIFICANT CHANGE UP (ref 0–2)
BILIRUB SERPL-MCNC: 0.4 MG/DL
BUN SERPL-MCNC: 30 MG/DL
CALCIUM SERPL-MCNC: 10.7 MG/DL
CHLORIDE SERPL-SCNC: 109 MMOL/L
CO2 SERPL-SCNC: 21 MMOL/L
CREAT SERPL-MCNC: 1.35 MG/DL
EOSINOPHIL # BLD AUTO: 0 K/UL — SIGNIFICANT CHANGE UP (ref 0–0.5)
EOSINOPHIL NFR BLD AUTO: 0.6 % — SIGNIFICANT CHANGE UP (ref 0–6)
GLUCOSE SERPL-MCNC: 104 MG/DL
HCT VFR BLD CALC: 38 % — SIGNIFICANT CHANGE UP (ref 34.5–45)
HGB BLD-MCNC: 12.5 G/DL — SIGNIFICANT CHANGE UP (ref 11.5–15.5)
LDH SERPL-CCNC: 163 U/L
LYMPHOCYTES # BLD AUTO: 3.5 K/UL — HIGH (ref 1–3.3)
LYMPHOCYTES # BLD AUTO: 49.7 % — HIGH (ref 13–44)
MAGNESIUM SERPL-MCNC: 1.8 MG/DL
MCHC RBC-ENTMCNC: 32.9 G/DL — SIGNIFICANT CHANGE UP (ref 32–36)
MCHC RBC-ENTMCNC: 33.6 PG — SIGNIFICANT CHANGE UP (ref 27–34)
MCV RBC AUTO: 102 FL — HIGH (ref 80–100)
MONOCYTES # BLD AUTO: 0.4 K/UL — SIGNIFICANT CHANGE UP (ref 0–0.9)
MONOCYTES NFR BLD AUTO: 5 % — SIGNIFICANT CHANGE UP (ref 2–14)
NEUTROPHILS # BLD AUTO: 3.1 K/UL — SIGNIFICANT CHANGE UP (ref 1.8–7.4)
NEUTROPHILS NFR BLD AUTO: 43.6 % — SIGNIFICANT CHANGE UP (ref 43–77)
PLATELET # BLD AUTO: 184 K/UL — SIGNIFICANT CHANGE UP (ref 150–400)
POTASSIUM SERPL-SCNC: 4.6 MMOL/L
PROT SERPL-MCNC: 6.6 G/DL
RBC # BLD: 3.72 M/UL — LOW (ref 3.8–5.2)
RBC # FLD: 12.8 % — SIGNIFICANT CHANGE UP (ref 10.3–14.5)
SODIUM SERPL-SCNC: 143 MMOL/L
TACROLIMUS SERPL-MCNC: 7.3 NG/ML
WBC # BLD: 7.1 K/UL — SIGNIFICANT CHANGE UP (ref 3.8–10.5)
WBC # FLD AUTO: 7.1 K/UL — SIGNIFICANT CHANGE UP (ref 3.8–10.5)

## 2019-08-29 PROCEDURE — 88291 CYTO/MOLECULAR REPORT: CPT

## 2019-08-29 PROCEDURE — 99215 OFFICE O/P EST HI 40 MIN: CPT

## 2019-08-29 RX ORDER — PREGABALIN 50 MG/1
50 CAPSULE ORAL 3 TIMES DAILY
Qty: 90 | Refills: 2 | Status: ACTIVE | COMMUNITY
Start: 2019-08-29 | End: 1900-01-01

## 2019-08-29 RX ORDER — PREGABALIN 100 MG/1
100 CAPSULE ORAL 3 TIMES DAILY
Qty: 90 | Refills: 2 | Status: ACTIVE | COMMUNITY
Start: 2019-08-29 | End: 1900-01-01

## 2019-08-30 LAB
CHROM ANALY INTERPHASE BLD FISH-IMP: SIGNIFICANT CHANGE UP
CMV DNA SPEC QL NAA+PROBE: NOT DETECTED
CMVPCR LOG: NOT DETECTED LOGIU/ML

## 2019-09-04 RX ORDER — HYDROMORPHONE HYDROCHLORIDE 4 MG/1
4 TABLET ORAL
Qty: 120 | Refills: 0 | Status: DISCONTINUED | COMMUNITY
Start: 2019-05-10 | End: 2019-09-04

## 2019-09-04 NOTE — REVIEW OF SYSTEMS
[Fatigue] : fatigue [Negative] : Respiratory [Chills] : no chills [Fever] : no fever [Night Sweats] : no night sweats [Joint Pain] : no joint pain [Joint Stiffness] : no joint stiffness [Muscle Weakness] : no muscle weakness [FreeTextEntry9] : back and upper extremity bone pain [FreeTextEntry5] : PICC line has been removed

## 2019-09-04 NOTE — HISTORY OF PRESENT ILLNESS
[de-identified] : Ms. 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 Her induction course was complicated by tutu fever. She received cycle 2 mid may. BM BX prior was morphologically remission but pcr was positive. Cycle 4 was completed. Prolonged course with fever, pericardial effusion, possible fungal pna. I suspect fever and effusion due to sprycel. She also had neurologic / mental status changes after MTX. She has an omaya. She had two MTX IT. She feels well today.  s/p step 1 for stem cell mobilization..in CR1..S/P auto stem cell transplant with tam 200 mg/m2 prep...\par \par 10/4/18 - 11/20/18 : 58F hx HTN, Obesity, Ph(+) ALL s/p R Hypercavd x4 cycles IT MTX x2 s/p stem cell collection w/ Step 1(HD vp16 plus arac) stem cell mobilization regime. \par FISH and PCR negative. s/p Tam-Auto on 12/16/16. Patient prior to presentation had severe burning right abdominal pain with vesicular lesions and was diagnosed with shingles and received a 10 day course of antiviral medications. \par Patient on presentation had residual neuropathic pain.  Patient presented to the ER with severe occipital parietal headaches with nausea and vomiting. \par Patient also admitted to easy bruising mouth sores and dark stools. \par CT Head was done for complaints of headache which was (+) for SDH. Neurosurgery was consulted on initial detection of SDH, recommendations to keep platelets >80,000 was made.  LGIB was attributed to profound thrombocytopenia. 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 A PICC line was placed for chemotherapy and was initiated with Inotuzamab on Day 1, 8 and 15. IVF and Allopurinol for TLS. The patient has had refractory thrombocytopenia. H L A platelets were infused when they were available. when H L A platelets were not available 1/2 unit of platelet was infused over 3 hours. \par Follow up CT Head on 10/11 was stable and the patient continued to receive 1/2 units of platelets over 3 hours every 12 hours. The patient received last dose of Inotuzamab Cycle 1 on 10/22. On 10/15 patient was febrile,  a CT of the chest/abd/pelvis was done which showed scattered patchy ground glass opacities in right upper and lower lobes, suggestive of infection, patient received a course of IV antibiotics for Pneumonia. On 11/2 Blood cultures were found to be (+) for Co-agulase (-) Staph and patient received a course of Vancomycin. \par Repeat CT Head was completed on 10/23 for follow up and showed some new bleeding into previous collection. Findings were discussed with Neuro Surgery. \par HLA platelets were administered when available for refractory thrombocytopenia. \par Cycle 2 Inotuzumab was started on 11/6 which was given on Days 1, 8, 15. \par Patient tolerated second cycle without any adverse reactions. Patient for possible future Haploidentical Allogeneic stem cell transplant after discharge. \par Pre-BMT testing completed prior to discharge: Echo, MUGA, Xray sinuses, 24-Hr Urine for Creatinine. \par Patient had intermittent severe headaches on 11/3 and 11/15, repeat CTs showed nearly resolved hemorrhage. Headaches were managed with analgesics Fentanyl patch 37 mcg/hr and Oxycodone IR 15 mg q3 prn prior to discharge home. She is on ponatinib QOD b/c of increased LFT's.  \par \par Patient underwent a haploidentical PBSCT on 2/13/19 (son), hospital course as follows:\par Ms. 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 Her transplant course was complicated by shingles. In October, 2018 she relapsed (confirmed via flow cytometry). She was treated with inotuzumab x 2 cycles. Her course was complicated by subdural hemorrhage, refractory thrombocytopenia requiring HLA matched platelets, pneumonia, and coag negative staph bacteremia (treated with vancomycin). She is now admitted for a haplo-identical peripheral blood stem cell transplant from her son. \par \par Upon admission, a TLC was placed in IR. Ms. Galeas received IV hydration, pain management, antiemetics, nutritional support, antiviral / antibacterial / antifungal / PCP / GI / VOD (SOS) prophylaxis. Labs were monitored on a daily basis, and she received electrolyte repletion and transfusional support as needed. \par \par Ms. Galeas had a relatively uncomplicated transplant course. A baseline CT of the head was done on admission given her history of SDH and refractory thrombocytopenia. The baseline CT was negative. Ms. Galeas did experience pancytopenia related to the high dose chemotherapy preparative regimen. Her thrombocytopenia was refractory, and was managed by infusing 1/2 unit of platelets twice daily over 3 hours. Ms. Galeas also experienced neutropenic fevers. When she became neutropenic, she was started on prophylactic ciprofloxacin. When she developed fevers, blood and urine cultures were sent, a CXR completed and the ciprofloxacin was changed to cefepime. Her cultures and CXR remained negative. \par \par On 2/13/19, Ms. Galeas received 280ml of fresh, mobilized, haplo-identical, HPC apheresis over approximately 1 hour. Cell counts as follows: \par Total MNCs (x 10^8/kg) = 4.36 \par CD34+ cells (x 10^6/kg) = 7.78 \par Cell Viability (%) = 100% \par \par Engraftment was noted on 2/27/19. Post engraftment, the cefepime and zarxio were discontinued. Tacrolimus levels and CMV PCR were monitored twice weekly. A FISH for Y was sent to determine chimerism, with results pending. 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 Patient states she is doing well overall. She reports a recent illness with chills and fever, which has since resolved. She c/o continued bone pain. Currently on tacrolimus 1mg BID and valcyte 450 mg BID. Prednisone was discontinued on 6/26/19. Right PICC line has been removed. Denies fever, chills, nausea, vomiting, diarrhea, rash, mouth sores, dysuria or any signs of active bleeding. Denies SOB, chest pain or B/L LE edema. Remains compliant with post transplant diet and crowd restrictions.\par \par On 8/29/19, patient presents for follow up visit status post Allo PBSCT on 2/13/19. Today is +197 days post transplant. Continues to complain of bilateral upper extremity pain and bone pain. Patient has been taking oxycodone prn. Denies fever, chills, nausea, vomiting, diarrhea, rash, mouth sores or any signs of active bleeding. Denies SOB, chest pain or B/L LE edema. Currently on FK 1 mg BID and did not take prior to blood work today. Remains compliant with post transplant diet and crowd restrictions. Remains compliant with post transplant medications.

## 2019-09-04 NOTE — PHYSICAL EXAM
[Ambulatory and capable of all self care but unable to carry out any work activities] : Status 2- Ambulatory and capable of all self care but unable to carry out any work activities. Up and about more than 50% of waking hours [Normal] : grossly intact [de-identified] : picc line has been removed [de-identified] : stable gait

## 2019-09-04 NOTE — REASON FOR VISIT
[Follow-Up Visit] : a follow-up visit for [Acute Lymphoblastic Leukemia] : acute lymphoblastic leukemia [FreeTextEntry2] : Status Post  Masha-Auto on 12/16/16 and S/P Haploidentical PBSCT on 2/13/19

## 2019-09-04 NOTE — ASSESSMENT
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 Relapsed ph pos ALL...s/p re induction with inotuzimab..Achieved CR. 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 \par \par 1) Ph+ ALL\par S/P Haploidentical PBSCT on 2/13/19 (son)\par 8/13/19 FISH for Y = 100% donor \par Pending post transplant BMbx\par \par 2) Heme\par Counts stable, no indication for transfusion\par  WBC 7.1 ANC 3.1 Hgb 12.5 \par Continue folic acid and multivitamin\par \par 3) ID\par Continue ppx:\par - Mepron 750 mg BID\par - Fluconazole 200 mg oral tablet -- decreased to 1 tab(s) by mouth once a day on 4/24/19\par Post transplant restrictions reviewed \par \par CMV viremia\par Acyclovir switched to Valcyte 450mg bid on 3/29/19\par Patient had mistakenly been taking acyclovir until 5/23/19, CMV PCR improved with starting Valcyte\par 8/13/19 CMV negative to date. 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 [facility] for generalized bone pain.\par Hypomagnesia - magnesium oxide 400 mg tid\par Headaches - continue Keppra 500 mg bid\par Insomnia - continue PRN Ambien 5 mg q hs\par PICC care - proper PICC maintenance reviewed, flushed daily, dressing changed weekly by visiting RN \par \par 8) Plan/Dispo\par Pt educated regarding plan of care, all questions/concerns addressed\par Instructed to contact our office with any new/worsening symptoms\par F/u in 2 weeks with QUAN Herron \par \par

## 2019-09-10 ENCOUNTER — OUTPATIENT (OUTPATIENT)
Dept: OUTPATIENT SERVICES | Facility: HOSPITAL | Age: 59
LOS: 1 days | Discharge: HOME | End: 2019-09-10

## 2019-09-10 DIAGNOSIS — C91.00 ACUTE LYMPHOBLASTIC LEUKEMIA NOT HAVING ACHIEVED REMISSION: ICD-10-CM

## 2019-09-10 DIAGNOSIS — Z98.89 OTHER SPECIFIED POSTPROCEDURAL STATES: Chronic | ICD-10-CM

## 2019-09-10 DIAGNOSIS — Z41.9 ENCOUNTER FOR PROCEDURE FOR PURPOSES OTHER THAN REMEDYING HEALTH STATE, UNSPECIFIED: Chronic | ICD-10-CM

## 2019-09-10 DIAGNOSIS — Z94.84 STEM CELLS TRANSPLANT STATUS: ICD-10-CM

## 2019-09-10 DIAGNOSIS — D17.9 BENIGN LIPOMATOUS NEOPLASM, UNSPECIFIED: Chronic | ICD-10-CM

## 2019-09-12 ENCOUNTER — APPOINTMENT (OUTPATIENT)
Dept: HEMATOLOGY ONCOLOGY | Facility: CLINIC | Age: 59
End: 2019-09-12
Payer: MEDICARE

## 2019-09-12 ENCOUNTER — RESULT REVIEW (OUTPATIENT)
Age: 59
End: 2019-09-12

## 2019-09-12 VITALS
OXYGEN SATURATION: 100 % | DIASTOLIC BLOOD PRESSURE: 87 MMHG | TEMPERATURE: 98.3 F | WEIGHT: 224.87 LBS | SYSTOLIC BLOOD PRESSURE: 122 MMHG | HEART RATE: 80 BPM | RESPIRATION RATE: 16 BRPM | BODY MASS INDEX: 40.09 KG/M2

## 2019-09-12 LAB
ALBUMIN SERPL ELPH-MCNC: 3.9 G/DL
ALP BLD-CCNC: 141 U/L
ALT SERPL-CCNC: 27 U/L
ANION GAP SERPL CALC-SCNC: 11 MMOL/L
AST SERPL-CCNC: 23 U/L
BASOPHILS # BLD AUTO: 0 K/UL — SIGNIFICANT CHANGE UP (ref 0–0.2)
BASOPHILS NFR BLD AUTO: 0.5 % — SIGNIFICANT CHANGE UP (ref 0–2)
BILIRUB SERPL-MCNC: 0.4 MG/DL
BUN SERPL-MCNC: 18 MG/DL
CALCIUM SERPL-MCNC: 10.7 MG/DL
CHLORIDE SERPL-SCNC: 108 MMOL/L
CO2 SERPL-SCNC: 26 MMOL/L
CREAT SERPL-MCNC: 1.07 MG/DL
EOSINOPHIL # BLD AUTO: 0 K/UL — SIGNIFICANT CHANGE UP (ref 0–0.5)
EOSINOPHIL NFR BLD AUTO: 0.5 % — SIGNIFICANT CHANGE UP (ref 0–6)
GLUCOSE SERPL-MCNC: 98 MG/DL
HCT VFR BLD CALC: 37.8 % — SIGNIFICANT CHANGE UP (ref 34.5–45)
HGB BLD-MCNC: 12.4 G/DL — SIGNIFICANT CHANGE UP (ref 11.5–15.5)
LDH SERPL-CCNC: 176 U/L
LYMPHOCYTES # BLD AUTO: 4.7 K/UL — HIGH (ref 1–3.3)
LYMPHOCYTES # BLD AUTO: 57.9 % — HIGH (ref 13–44)
MAGNESIUM SERPL-MCNC: 1.9 MG/DL
MCHC RBC-ENTMCNC: 32.9 G/DL — SIGNIFICANT CHANGE UP (ref 32–36)
MCHC RBC-ENTMCNC: 33.8 PG — SIGNIFICANT CHANGE UP (ref 27–34)
MCV RBC AUTO: 103 FL — HIGH (ref 80–100)
MONOCYTES # BLD AUTO: 0.5 K/UL — SIGNIFICANT CHANGE UP (ref 0–0.9)
MONOCYTES NFR BLD AUTO: 5.8 % — SIGNIFICANT CHANGE UP (ref 2–14)
NEUTROPHILS # BLD AUTO: 2.9 K/UL — SIGNIFICANT CHANGE UP (ref 1.8–7.4)
NEUTROPHILS NFR BLD AUTO: 35.2 % — LOW (ref 43–77)
PLATELET # BLD AUTO: 124 K/UL — LOW (ref 150–400)
POTASSIUM SERPL-SCNC: 4.2 MMOL/L
PROT SERPL-MCNC: 6.4 G/DL
RBC # BLD: 3.68 M/UL — LOW (ref 3.8–5.2)
RBC # FLD: 13.6 % — SIGNIFICANT CHANGE UP (ref 10.3–14.5)
SODIUM SERPL-SCNC: 145 MMOL/L
TACROLIMUS SERPL-MCNC: 3.1 NG/ML
WBC # BLD: 8.1 K/UL — SIGNIFICANT CHANGE UP (ref 3.8–10.5)
WBC # FLD AUTO: 8.1 K/UL — SIGNIFICANT CHANGE UP (ref 3.8–10.5)

## 2019-09-12 PROCEDURE — 88291 CYTO/MOLECULAR REPORT: CPT

## 2019-09-12 PROCEDURE — 99214 OFFICE O/P EST MOD 30 MIN: CPT

## 2019-09-12 PROCEDURE — 88271 CYTOGENETICS DNA PROBE: CPT

## 2019-09-12 PROCEDURE — 88237 TISSUE CULTURE BONE MARROW: CPT

## 2019-09-12 PROCEDURE — 88275 CYTOGENETICS 100-300: CPT

## 2019-09-13 LAB
CMV DNA SPEC QL NAA+PROBE: NOT DETECTED
CMVPCR LOG: NOT DETECTED LOGIU/ML

## 2019-09-15 NOTE — HISTORY OF PRESENT ILLNESS
[de-identified] : Ms. 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 Her induction course was complicated by tutu fever. She received cycle 2 mid may. BM BX prior was morphologically remission but pcr was positive. Cycle 4 was completed. Prolonged course with fever, pericardial effusion, possible fungal pna. I suspect fever and effusion due to sprycel. She also had neurologic / mental status changes after MTX. She has an omaya. She had two MTX IT. She feels well today.  s/p step 1 for stem cell mobilization..in CR1..S/P auto stem cell transplant with tam 200 mg/m2 prep...\par \par 10/4/18 - 11/20/18 : 58F hx HTN, Obesity, Ph(+) ALL s/p R Hypercavd x4 cycles IT MTX x2 s/p stem cell collection w/ Step 1(HD vp16 plus arac) stem cell mobilization regime. \par FISH and PCR negative. s/p Tam-Auto on 12/16/16. Patient prior to presentation had severe burning right abdominal pain with vesicular lesions and was diagnosed with shingles and received a 10 day course of antiviral medications. \par Patient on presentation had residual neuropathic pain.  Patient presented to the ER with severe occipital parietal headaches with nausea and vomiting. \par Patient also admitted to easy bruising mouth sores and dark stools. \par CT Head was done for complaints of headache which was (+) for SDH. Neurosurgery was consulted on initial detection of SDH, recommendations to keep platelets >80,000 was made.  LGIB was attributed to profound thrombocytopenia. 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 A PICC line was placed for chemotherapy and was initiated with Inotuzamab on Day 1, 8 and 15. IVF and Allopurinol for TLS. The patient has had refractory thrombocytopenia. H L A platelets were infused when they were available. when H L A platelets were not available 1/2 unit of platelet was infused over 3 hours. \par Follow up CT Head on 10/11 was stable and the patient continued to receive 1/2 units of platelets over 3 hours every 12 hours. The patient received last dose of Inotuzamab Cycle 1 on 10/22. On 10/15 patient was febrile,  a CT of the chest/abd/pelvis was done which showed scattered patchy ground glass opacities in right upper and lower lobes, suggestive of infection, patient received a course of IV antibiotics for Pneumonia. On 11/2 Blood cultures were found to be (+) for Co-agulase (-) Staph and patient received a course of Vancomycin. \par Repeat CT Head was completed on 10/23 for follow up and showed some new bleeding into previous collection. Findings were discussed with Neuro Surgery. \par HLA platelets were administered when available for refractory thrombocytopenia. \par Cycle 2 Inotuzumab was started on 11/6 which was given on Days 1, 8, 15. \par Patient tolerated second cycle without any adverse reactions. Patient for possible future Haploidentical Allogeneic stem cell transplant after discharge. \par Pre-BMT testing completed prior to discharge: Echo, MUGA, Xray sinuses, 24-Hr Urine for Creatinine. \par Patient had intermittent severe headaches on 11/3 and 11/15, repeat CTs showed nearly resolved hemorrhage. Headaches were managed with analgesics Fentanyl patch 37 mcg/hr and Oxycodone IR 15 mg q3 prn prior to discharge home. She is on ponatinib QOD b/c of increased LFT's.  \par \par Patient underwent a haploidentical PBSCT on 2/13/19 (son), hospital course as follows:\par Ms. 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 Her transplant course was complicated by shingles. In October, 2018 she relapsed (confirmed via flow cytometry). She was treated with inotuzumab x 2 cycles. Her course was complicated by subdural hemorrhage, refractory thrombocytopenia requiring HLA matched platelets, pneumonia, and coag negative staph bacteremia (treated with vancomycin). She is now admitted for a haplo-identical peripheral blood stem cell transplant from her son. \par \par Upon admission, a TLC was placed in IR. Ms. Galeas received IV hydration, pain management, antiemetics, nutritional support, antiviral / antibacterial / antifungal / PCP / GI / VOD (SOS) prophylaxis. Labs were monitored on a daily basis, and she received electrolyte repletion and transfusional support as needed. \par \par Ms. Galeas had a relatively uncomplicated transplant course. A baseline CT of the head was done on admission given her history of SDH and refractory thrombocytopenia. The baseline CT was negative. Ms. Galeas did experience pancytopenia related to the high dose chemotherapy preparative regimen. Her thrombocytopenia was refractory, and was managed by infusing 1/2 unit of platelets twice daily over 3 hours. Ms. Galeas also experienced neutropenic fevers. When she became neutropenic, she was started on prophylactic ciprofloxacin. When she developed fevers, blood and urine cultures were sent, a CXR completed and the ciprofloxacin was changed to cefepime. Her cultures and CXR remained negative. \par \par On 2/13/19, Ms. Galeas received 280ml of fresh, mobilized, haplo-identical, HPC apheresis over approximately 1 hour. Cell counts as follows: \par Total MNCs (x 10^8/kg) = 4.36 \par CD34+ cells (x 10^6/kg) = 7.78 \par Cell Viability (%) = 100% \par \par Engraftment was noted on 2/27/19. Post engraftment, the cefepime and zarxio were discontinued. Tacrolimus levels and CMV PCR were monitored twice weekly. A FISH for Y was sent to determine chimerism, with results pending. 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 Patient states she is doing well overall. She reports a recent illness with chills and fever, which has since resolved. She c/o continued bone pain. Currently on tacrolimus 1mg BID and valcyte 450 mg BID. Prednisone was discontinued on 6/26/19. Right PICC line has been removed. Denies fever, chills, nausea, vomiting, diarrhea, rash, mouth sores, dysuria or any signs of active bleeding. Denies SOB, chest pain or B/L LE edema. Remains compliant with post transplant diet and crowd restrictions.\par \par On 8/29/19, patient presents for follow up visit status post Allo PBSCT on 2/13/19. Today is +197 days post transplant. Continues to complain of bilateral upper extremity pain and bone pain. Patient has been taking oxycodone prn. Denies fever, chills, nausea, vomiting, diarrhea, rash, mouth sores or any signs of active bleeding. Denies SOB, chest pain or B/L LE edema. Currently on FK 1 mg BID and did not take prior to blood work today. Remains compliant with post transplant diet and crowd restrictions. Remains compliant with post transplant medications. \par \par On 9/12/19, patient presents for follow up visit status post Allo PBSCT on 2/13/19. Today is +211 days post transplant .Continues to complain of bilateral upper extremity pain and bone pain. Denies fever, chills, nausea, vomiting, diarrhea, rash, mouth sores or any signs of active bleeding. Denies SOB, chest pain or B/L LE edema. Currently on FK 1 mg AM and 0.5 mg in the PM and did not take prior to blood work today. Remains compliant with post transplant diet and crowd restrictions. Remains compliant with post transplant medications. \par

## 2019-09-15 NOTE — PHYSICAL EXAM
[Ambulatory and capable of all self care but unable to carry out any work activities] : Status 2- Ambulatory and capable of all self care but unable to carry out any work activities. Up and about more than 50% of waking hours [Normal] : affect appropriate [de-identified] : stable gait  [de-identified] : picc line has been removed

## 2019-09-15 NOTE — REVIEW OF SYSTEMS
[Fatigue] : fatigue [Negative] : Allergic/Immunologic [Fever] : no fever [Night Sweats] : no night sweats [Chills] : no chills [Joint Pain] : no joint pain [Joint Stiffness] : no joint stiffness [Muscle Weakness] : no muscle weakness [FreeTextEntry5] : PICC line has been removed [FreeTextEntry9] : back and upper extremity bone pain

## 2019-09-16 LAB — CHROM ANALY INTERPHASE BLD FISH-IMP: SIGNIFICANT CHANGE UP

## 2019-10-10 ENCOUNTER — RESULT REVIEW (OUTPATIENT)
Age: 59
End: 2019-10-10

## 2019-10-10 ENCOUNTER — APPOINTMENT (OUTPATIENT)
Dept: HEMATOLOGY ONCOLOGY | Facility: CLINIC | Age: 59
End: 2019-10-10
Payer: MEDICARE

## 2019-10-10 ENCOUNTER — OUTPATIENT (OUTPATIENT)
Dept: OUTPATIENT SERVICES | Facility: HOSPITAL | Age: 59
LOS: 1 days | End: 2019-10-10
Payer: MEDICARE

## 2019-10-10 VITALS
HEART RATE: 88 BPM | RESPIRATION RATE: 17 BRPM | BODY MASS INDEX: 40.13 KG/M2 | TEMPERATURE: 98.4 F | OXYGEN SATURATION: 99 % | WEIGHT: 225.09 LBS | SYSTOLIC BLOOD PRESSURE: 136 MMHG | DIASTOLIC BLOOD PRESSURE: 84 MMHG

## 2019-10-10 DIAGNOSIS — Z94.84 STEM CELLS TRANSPLANT STATUS: ICD-10-CM

## 2019-10-10 DIAGNOSIS — Z98.89 OTHER SPECIFIED POSTPROCEDURAL STATES: Chronic | ICD-10-CM

## 2019-10-10 DIAGNOSIS — C91.00 ACUTE LYMPHOBLASTIC LEUKEMIA NOT HAVING ACHIEVED REMISSION: ICD-10-CM

## 2019-10-10 DIAGNOSIS — Z41.9 ENCOUNTER FOR PROCEDURE FOR PURPOSES OTHER THAN REMEDYING HEALTH STATE, UNSPECIFIED: Chronic | ICD-10-CM

## 2019-10-10 DIAGNOSIS — D17.9 BENIGN LIPOMATOUS NEOPLASM, UNSPECIFIED: Chronic | ICD-10-CM

## 2019-10-10 LAB
ALBUMIN SERPL ELPH-MCNC: 4.1 G/DL
ALP BLD-CCNC: 129 U/L
ALT SERPL-CCNC: 15 U/L
ANION GAP SERPL CALC-SCNC: 15 MMOL/L
AST SERPL-CCNC: 19 U/L
BASOPHILS # BLD AUTO: 0 K/UL — SIGNIFICANT CHANGE UP (ref 0–0.2)
BASOPHILS NFR BLD AUTO: 0.2 % — SIGNIFICANT CHANGE UP (ref 0–2)
BILIRUB SERPL-MCNC: 0.3 MG/DL
BUN SERPL-MCNC: 22 MG/DL
CALCIUM SERPL-MCNC: 10.5 MG/DL
CHLORIDE SERPL-SCNC: 108 MMOL/L
CO2 SERPL-SCNC: 22 MMOL/L
CREAT SERPL-MCNC: 1.01 MG/DL
EOSINOPHIL # BLD AUTO: 0.1 K/UL — SIGNIFICANT CHANGE UP (ref 0–0.5)
EOSINOPHIL NFR BLD AUTO: 1 % — SIGNIFICANT CHANGE UP (ref 0–6)
GLUCOSE SERPL-MCNC: 90 MG/DL
HCT VFR BLD CALC: 38.2 % — SIGNIFICANT CHANGE UP (ref 34.5–45)
HGB BLD-MCNC: 12.5 G/DL — SIGNIFICANT CHANGE UP (ref 11.5–15.5)
LDH SERPL-CCNC: 172 U/L
LYMPHOCYTES # BLD AUTO: 4.9 K/UL — HIGH (ref 1–3.3)
LYMPHOCYTES # BLD AUTO: 54.8 % — HIGH (ref 13–44)
MAGNESIUM SERPL-MCNC: 1.9 MG/DL
MCHC RBC-ENTMCNC: 32.7 G/DL — SIGNIFICANT CHANGE UP (ref 32–36)
MCHC RBC-ENTMCNC: 33.5 PG — SIGNIFICANT CHANGE UP (ref 27–34)
MCV RBC AUTO: 102 FL — HIGH (ref 80–100)
MONOCYTES # BLD AUTO: 0.4 K/UL — SIGNIFICANT CHANGE UP (ref 0–0.9)
MONOCYTES NFR BLD AUTO: 4.7 % — SIGNIFICANT CHANGE UP (ref 2–14)
NEUTROPHILS # BLD AUTO: 3.5 K/UL — SIGNIFICANT CHANGE UP (ref 1.8–7.4)
NEUTROPHILS NFR BLD AUTO: 39.2 % — LOW (ref 43–77)
PLATELET # BLD AUTO: 145 K/UL — LOW (ref 150–400)
POTASSIUM SERPL-SCNC: 4.5 MMOL/L
PROT SERPL-MCNC: 6.6 G/DL
RBC # BLD: 3.73 M/UL — LOW (ref 3.8–5.2)
RBC # FLD: 13.6 % — SIGNIFICANT CHANGE UP (ref 10.3–14.5)
SODIUM SERPL-SCNC: 145 MMOL/L
TACROLIMUS SERPL-MCNC: 2.9 NG/ML
WBC # BLD: 9 K/UL — SIGNIFICANT CHANGE UP (ref 3.8–10.5)
WBC # FLD AUTO: 9 K/UL — SIGNIFICANT CHANGE UP (ref 3.8–10.5)

## 2019-10-10 PROCEDURE — 99215 OFFICE O/P EST HI 40 MIN: CPT

## 2019-10-10 PROCEDURE — 88291 CYTO/MOLECULAR REPORT: CPT

## 2019-10-15 ENCOUNTER — RX RENEWAL (OUTPATIENT)
Age: 59
End: 2019-10-15

## 2019-10-15 NOTE — HISTORY OF PRESENT ILLNESS
[de-identified] : Ms. 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 Her induction course was complicated by tutu fever. She received cycle 2 mid may. BM BX prior was morphologically remission but pcr was positive. Cycle 4 was completed. Prolonged course with fever, pericardial effusion, possible fungal pna. I suspect fever and effusion due to sprycel. She also had neurologic / mental status changes after MTX. She has an omaya. She had two MTX IT. She feels well today.  s/p step 1 for stem cell mobilization..in CR1..S/P auto stem cell transplant with tam 200 mg/m2 prep...\par \par 10/4/18 - 11/20/18 : 58F hx HTN, Obesity, Ph(+) ALL s/p R Hypercavd x4 cycles IT MTX x2 s/p stem cell collection w/ Step 1(HD vp16 plus arac) stem cell mobilization regime. \par FISH and PCR negative. s/p Tam-Auto on 12/16/16. Patient prior to presentation had severe burning right abdominal pain with vesicular lesions and was diagnosed with shingles and received a 10 day course of antiviral medications. \par Patient on presentation had residual neuropathic pain.  Patient presented to the ER with severe occipital parietal headaches with nausea and vomiting. \par Patient also admitted to easy bruising mouth sores and dark stools. \par CT Head was done for complaints of headache which was (+) for SDH. Neurosurgery was consulted on initial detection of SDH, recommendations to keep platelets >80,000 was made.  LGIB was attributed to profound thrombocytopenia. 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 A PICC line was placed for chemotherapy and was initiated with Inotuzamab on Day 1, 8 and 15. IVF and Allopurinol for TLS. The patient has had refractory thrombocytopenia. H L A platelets were infused when they were available. when H L A platelets were not available 1/2 unit of platelet was infused over 3 hours. \par Follow up CT Head on 10/11 was stable and the patient continued to receive 1/2 units of platelets over 3 hours every 12 hours. The patient received last dose of Inotuzamab Cycle 1 on 10/22. On 10/15 patient was febrile,  a CT of the chest/abd/pelvis was done which showed scattered patchy ground glass opacities in right upper and lower lobes, suggestive of infection, patient received a course of IV antibiotics for Pneumonia. On 11/2 Blood cultures were found to be (+) for Co-agulase (-) Staph and patient received a course of Vancomycin. \par Repeat CT Head was completed on 10/23 for follow up and showed some new bleeding into previous collection. Findings were discussed with Neuro Surgery. \par HLA platelets were administered when available for refractory thrombocytopenia. \par Cycle 2 Inotuzumab was started on 11/6 which was given on Days 1, 8, 15. \par Patient tolerated second cycle without any adverse reactions. Patient for possible future Haploidentical Allogeneic stem cell transplant after discharge. \par Pre-BMT testing completed prior to discharge: Echo, MUGA, Xray sinuses, 24-Hr Urine for Creatinine. \par Patient had intermittent severe headaches on 11/3 and 11/15, repeat CTs showed nearly resolved hemorrhage. Headaches were managed with analgesics Fentanyl patch 37 mcg/hr and Oxycodone IR 15 mg q3 prn prior to discharge home. She is on ponatinib QOD b/c of increased LFT's.  \par \par Patient underwent a haploidentical PBSCT on 2/13/19 (son), hospital course as follows:\par Ms. 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 Her transplant course was complicated by shingles. In October, 2018 she relapsed (confirmed via flow cytometry). She was treated with inotuzumab x 2 cycles. Her course was complicated by subdural hemorrhage, refractory thrombocytopenia requiring HLA matched platelets, pneumonia, and coag negative staph bacteremia (treated with vancomycin). She is now admitted for a haplo-identical peripheral blood stem cell transplant from her son. \par \par Upon admission, a TLC was placed in IR. Ms. Galeas received IV hydration, pain management, antiemetics, nutritional support, antiviral / antibacterial / antifungal / PCP / GI / VOD (SOS) prophylaxis. Labs were monitored on a daily basis, and she received electrolyte repletion and transfusional support as needed. \par \par Ms. Galeas had a relatively uncomplicated transplant course. A baseline CT of the head was done on admission given her history of SDH and refractory thrombocytopenia. The baseline CT was negative. Ms. Galesa did experience pancytopenia related to the high dose chemotherapy preparative regimen. Her thrombocytopenia was refractory, and was managed by infusing 1/2 unit of platelets twice daily over 3 hours. Ms. Galeas also experienced neutropenic fevers. When she became neutropenic, she was started on prophylactic ciprofloxacin. When she developed fevers, blood and urine cultures were sent, a CXR completed and the ciprofloxacin was changed to cefepime. Her cultures and CXR remained negative. \par \par On 2/13/19, Ms. Galeas received 280ml of fresh, mobilized, haplo-identical, HPC apheresis over approximately 1 hour. Cell counts as follows: \par Total MNCs (x 10^8/kg) = 4.36 \par CD34+ cells (x 10^6/kg) = 7.78 \par Cell Viability (%) = 100% \par \par Engraftment was noted on 2/27/19. Post engraftment, the cefepime and zarxio were discontinued. Tacrolimus levels and CMV PCR were monitored twice weekly. A FISH for Y was sent to determine chimerism, with results pending. 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 Patient states she is doing well overall. She reports a recent illness with chills and fever, which has since resolved. She c/o continued bone pain. Currently on tacrolimus 1mg BID and valcyte 450 mg BID. Prednisone was discontinued on 6/26/19. Right PICC line has been removed. Denies fever, chills, nausea, vomiting, diarrhea, rash, mouth sores, dysuria or any signs of active bleeding. Denies SOB, chest pain or B/L LE edema. Remains compliant with post transplant diet and crowd restrictions.\par \par On 8/29/19, patient presents for follow up visit status post Allo PBSCT on 2/13/19. Today is +197 days post transplant. Continues to complain of bilateral upper extremity pain and bone pain. Patient has been taking oxycodone prn. Denies fever, chills, nausea, vomiting, diarrhea, rash, mouth sores or any signs of active bleeding. Denies SOB, chest pain or B/L LE edema. Currently on FK 1 mg BID and did not take prior to blood work today. Remains compliant with post transplant diet and crowd restrictions. Remains compliant with post transplant medications. \par \par On 9/12/19, patient presents for follow up visit status post Allo PBSCT on 2/13/19. Today is +211 days post transplant .Continues to complain of bilateral upper extremity pain and bone pain. Denies fever, chills, nausea, vomiting, diarrhea, rash, mouth sores or any signs of active bleeding. Denies SOB, chest pain or B/L LE edema. Currently on FK 1 mg AM and 0.5 mg in the PM and did not take prior to blood work today. Remains compliant with post transplant diet and crowd restrictions. Remains compliant with post transplant medications. \par \par On 10/10/19, patient presents for follow up visit status post Allo PBSCT on 2/13/19. Today is +239 days post transplant. Continues to complain of bilateral upper extremity pain, back pain. Pain is described as bone pain. On lyrica with no improvement. . Denies fever, chills, nausea, vomiting, diarrhea, rash, mouth sores or any signs of active bleeding. Denies SOB, chest pain or B/L LE edema. On a tacrolimus taper, currently taking 0.5 mg BID. Remains compliant with post transplant diet and crowd restrictions. Remains compliant with post transplant medications.

## 2019-10-15 NOTE — PHYSICAL EXAM
[Ambulatory and capable of all self care but unable to carry out any work activities] : Status 2- Ambulatory and capable of all self care but unable to carry out any work activities. Up and about more than 50% of waking hours [Normal] : affect appropriate [de-identified] : stable gait  [de-identified] : picc line has been removed

## 2019-10-15 NOTE — ASSESSMENT
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 Relapsed ph pos ALL...s/p re induction with inotuzimab..Achieved CR. 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 \par \par 1) Ph+ ALL\par S/P Haploidentical PBSCT on 2/13/19 (son)\par 10/10/19 Fish for Y 100% donor\par Pending post transplant BMbx\par \par 2) Heme\par Counts stable, no indication for transfusion\par  WBC 9 ANC 3.5 Hgb 12.5 \par Continue folic acid and multivitamin\par \par 3) ID\par Continue ppx:\par - Mepron 750 mg BID\par - Fluconazole 200 mg oral tablet -- decreased to 1 tab(s) by mouth once a day on 4/24/19\par Post transplant restrictions reviewed \par \par CMV viremia\par Acyclovir switched to Valcyte 450 mg bid on 3/29/19\par Patient had mistakenly been taking acyclovir until 5/23/19, CMV PCR improved with starting Valcyte\par 10/10/19 CMV negative to date. Continue to monitor CMV PCR weekly\par Continue Valcyte 450 mg bid, decreased as of 6/12/19\par \par 4) GVHD\par Skin 0 Liver 0 GI 0 - overall grade 0\par No signs of acute/chronic GVHD\par FK taper started on 9/12/19. Decrease FK to 0.5 mg BID Mon-Saturday, take FK 0.5 mg daily on Foreign 10/13/19. Next week take FK 0.5 mg BID Mon-Sat and do not take On Foreign 10/20/19. 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 Patient given the number for pain specialists. \par Hypomagnesia - magnesium oxide 400 mg tid\par Headaches - continue Keppra 500 mg bid\par Insomnia - continue PRN Ambien 5 mg q hs\par \par 8) Plan/Dispo\par Pt educated regarding plan of care, all questions/concerns addressed\par Instructed to contact our office with any new/worsening symptoms\par F/u in 2 weeks with MD Bentley \par \par

## 2019-10-21 ENCOUNTER — OUTPATIENT (OUTPATIENT)
Dept: OUTPATIENT SERVICES | Facility: HOSPITAL | Age: 59
LOS: 1 days | Discharge: HOME | End: 2019-10-21

## 2019-10-21 DIAGNOSIS — Z41.9 ENCOUNTER FOR PROCEDURE FOR PURPOSES OTHER THAN REMEDYING HEALTH STATE, UNSPECIFIED: Chronic | ICD-10-CM

## 2019-10-21 DIAGNOSIS — C91.00 ACUTE LYMPHOBLASTIC LEUKEMIA NOT HAVING ACHIEVED REMISSION: ICD-10-CM

## 2019-10-21 DIAGNOSIS — Z98.89 OTHER SPECIFIED POSTPROCEDURAL STATES: Chronic | ICD-10-CM

## 2019-10-21 DIAGNOSIS — D17.9 BENIGN LIPOMATOUS NEOPLASM, UNSPECIFIED: Chronic | ICD-10-CM

## 2019-10-31 ENCOUNTER — APPOINTMENT (OUTPATIENT)
Dept: HEMATOLOGY ONCOLOGY | Facility: CLINIC | Age: 59
End: 2019-10-31

## 2019-11-04 ENCOUNTER — RESULT REVIEW (OUTPATIENT)
Age: 59
End: 2019-11-04

## 2019-11-04 ENCOUNTER — APPOINTMENT (OUTPATIENT)
Dept: HEMATOLOGY ONCOLOGY | Facility: CLINIC | Age: 59
End: 2019-11-04
Payer: MEDICARE

## 2019-11-04 VITALS
OXYGEN SATURATION: 99 % | TEMPERATURE: 98.6 F | BODY MASS INDEX: 39.46 KG/M2 | SYSTOLIC BLOOD PRESSURE: 122 MMHG | WEIGHT: 221.34 LBS | HEART RATE: 82 BPM | DIASTOLIC BLOOD PRESSURE: 86 MMHG | RESPIRATION RATE: 16 BRPM

## 2019-11-04 DIAGNOSIS — R52 PAIN, UNSPECIFIED: ICD-10-CM

## 2019-11-04 LAB
BASOPHILS # BLD AUTO: 0.1 K/UL — SIGNIFICANT CHANGE UP (ref 0–0.2)
BASOPHILS NFR BLD AUTO: 1.3 % — SIGNIFICANT CHANGE UP (ref 0–2)
EOSINOPHIL # BLD AUTO: 0.1 K/UL — SIGNIFICANT CHANGE UP (ref 0–0.5)
EOSINOPHIL NFR BLD AUTO: 0.7 % — SIGNIFICANT CHANGE UP (ref 0–6)
HCT VFR BLD CALC: 40.5 % — SIGNIFICANT CHANGE UP (ref 34.5–45)
HGB BLD-MCNC: 13.7 G/DL — SIGNIFICANT CHANGE UP (ref 11.5–15.5)
LYMPHOCYTES # BLD AUTO: 6.6 K/UL — HIGH (ref 1–3.3)
LYMPHOCYTES # BLD AUTO: 62.5 % — HIGH (ref 13–44)
MCHC RBC-ENTMCNC: 33.9 G/DL — SIGNIFICANT CHANGE UP (ref 32–36)
MCHC RBC-ENTMCNC: 34.1 PG — HIGH (ref 27–34)
MCV RBC AUTO: 101 FL — HIGH (ref 80–100)
MONOCYTES # BLD AUTO: 0.5 K/UL — SIGNIFICANT CHANGE UP (ref 0–0.9)
MONOCYTES NFR BLD AUTO: 4.4 % — SIGNIFICANT CHANGE UP (ref 2–14)
NEUTROPHILS # BLD AUTO: 3.3 K/UL — SIGNIFICANT CHANGE UP (ref 1.8–7.4)
NEUTROPHILS NFR BLD AUTO: 31.1 % — LOW (ref 43–77)
PLATELET # BLD AUTO: 164 K/UL — SIGNIFICANT CHANGE UP (ref 150–400)
RBC # BLD: 4.03 M/UL — SIGNIFICANT CHANGE UP (ref 3.8–5.2)
RBC # FLD: 12.6 % — SIGNIFICANT CHANGE UP (ref 10.3–14.5)
WBC # BLD: 10.6 K/UL — HIGH (ref 3.8–10.5)
WBC # FLD AUTO: 10.6 K/UL — HIGH (ref 3.8–10.5)

## 2019-11-04 PROCEDURE — 99215 OFFICE O/P EST HI 40 MIN: CPT

## 2019-11-04 PROCEDURE — 88237 TISSUE CULTURE BONE MARROW: CPT

## 2019-11-04 PROCEDURE — 88275 CYTOGENETICS 100-300: CPT

## 2019-11-04 PROCEDURE — 88291 CYTO/MOLECULAR REPORT: CPT

## 2019-11-04 PROCEDURE — 88271 CYTOGENETICS DNA PROBE: CPT

## 2019-11-04 NOTE — HISTORY OF PRESENT ILLNESS
[de-identified] : Ms. 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 Her induction course was complicated by tutu fever. She received cycle 2 mid may. BM BX prior was morphologically remission but pcr was positive. Cycle 4 was completed. Prolonged course with fever, pericardial effusion, possible fungal pna. I suspect fever and effusion due to sprycel. She also had neurologic / mental status changes after MTX. She has an omaya. She had two MTX IT. She feels well today.  s/p step 1 for stem cell mobilization..in CR1..S/P auto stem cell transplant with tam 200 mg/m2 prep...\par \par 10/4/18 - 11/20/18 : 58F hx HTN, Obesity, Ph(+) ALL s/p R Hypercavd x4 cycles IT MTX x2 s/p stem cell collection w/ Step 1(HD vp16 plus arac) stem cell mobilization regime. \par FISH and PCR negative. s/p Tam-Auto on 12/16/16. Patient prior to presentation had severe burning right abdominal pain with vesicular lesions and was diagnosed with shingles and received a 10 day course of antiviral medications. \par Patient on presentation had residual neuropathic pain.  Patient presented to the ER with severe occipital parietal headaches with nausea and vomiting. \par Patient also admitted to easy bruising mouth sores and dark stools. \par CT Head was done for complaints of headache which was (+) for SDH. Neurosurgery was consulted on initial detection of SDH, recommendations to keep platelets >80,000 was made.  LGIB was attributed to profound thrombocytopenia. 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 A PICC line was placed for chemotherapy and was initiated with Inotuzamab on Day 1, 8 and 15. IVF and Allopurinol for TLS. The patient has had refractory thrombocytopenia. H L A platelets were infused when they were available. when H L A platelets were not available 1/2 unit of platelet was infused over 3 hours. \par Follow up CT Head on 10/11 was stable and the patient continued to receive 1/2 units of platelets over 3 hours every 12 hours. The patient received last dose of Inotuzamab Cycle 1 on 10/22. On 10/15 patient was febrile,  a CT of the chest/abd/pelvis was done which showed scattered patchy ground glass opacities in right upper and lower lobes, suggestive of infection, patient received a course of IV antibiotics for Pneumonia. On 11/2 Blood cultures were found to be (+) for Co-agulase (-) Staph and patient received a course of Vancomycin. \par Repeat CT Head was completed on 10/23 for follow up and showed some new bleeding into previous collection. Findings were discussed with Neuro Surgery. \par HLA platelets were administered when available for refractory thrombocytopenia. \par Cycle 2 Inotuzumab was started on 11/6 which was given on Days 1, 8, 15. \par Patient tolerated second cycle without any adverse reactions. Patient for possible future Haploidentical Allogeneic stem cell transplant after discharge. \par Pre-BMT testing completed prior to discharge: Echo, MUGA, Xray sinuses, 24-Hr Urine for Creatinine. \par Patient had intermittent severe headaches on 11/3 and 11/15, repeat CTs showed nearly resolved hemorrhage. Headaches were managed with analgesics Fentanyl patch 37 mcg/hr and Oxycodone IR 15 mg q3 prn prior to discharge home. She is on ponatinib QOD b/c of increased LFT's.  \par \par Patient underwent a haploidentical PBSCT on 2/13/19 (son), hospital course as follows:\par Ms. 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 Her transplant course was complicated by shingles. In October, 2018 she relapsed (confirmed via flow cytometry). She was treated with inotuzumab x 2 cycles. Her course was complicated by subdural hemorrhage, refractory thrombocytopenia requiring HLA matched platelets, pneumonia, and coag negative staph bacteremia (treated with vancomycin). She is now admitted for a haplo-identical peripheral blood stem cell transplant from her son. \par \par Upon admission, a TLC was placed in IR. Ms. Galeas received IV hydration, pain management, antiemetics, nutritional support, antiviral / antibacterial / antifungal / PCP / GI / VOD (SOS) prophylaxis. Labs were monitored on a daily basis, and she received electrolyte repletion and transfusional support as needed. \par \par Ms. Galeas had a relatively uncomplicated transplant course. A baseline CT of the head was done on admission given her history of SDH and refractory thrombocytopenia. The baseline CT was negative. Ms. Galeas did experience pancytopenia related to the high dose chemotherapy preparative regimen. Her thrombocytopenia was refractory, and was managed by infusing 1/2 unit of platelets twice daily over 3 hours. Ms. Galeas also experienced neutropenic fevers. When she became neutropenic, she was started on prophylactic ciprofloxacin. When she developed fevers, blood and urine cultures were sent, a CXR completed and the ciprofloxacin was changed to cefepime. Her cultures and CXR remained negative. \par \par On 2/13/19, Ms. Galeas received 280ml of fresh, mobilized, haplo-identical, HPC apheresis over approximately 1 hour. Cell counts as follows: \par Total MNCs (x 10^8/kg) = 4.36 \par CD34+ cells (x 10^6/kg) = 7.78 \par Cell Viability (%) = 100% \par \par Engraftment was noted on 2/27/19. Post engraftment, the cefepime and zarxio were discontinued. Tacrolimus levels and CMV PCR were monitored twice weekly. A FISH for Y was sent to determine chimerism, with results pending. 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 Patient states she is doing well overall. She reports a recent illness with chills and fever, which has since resolved. She c/o continued bone pain. Currently on tacrolimus 1mg BID and valcyte 450 mg BID. Prednisone was discontinued on 6/26/19. Right PICC line has been removed. Denies fever, chills, nausea, vomiting, diarrhea, rash, mouth sores, dysuria or any signs of active bleeding. Denies SOB, chest pain or B/L LE edema. Remains compliant with post transplant diet and crowd restrictions.\par \par On 8/29/19, patient presents for follow up visit status post Allo PBSCT on 2/13/19. Today is +197 days post transplant. Continues to complain of bilateral upper extremity pain and bone pain. Patient has been taking oxycodone prn. Denies fever, chills, nausea, vomiting, diarrhea, rash, mouth sores or any signs of active bleeding. Denies SOB, chest pain or B/L LE edema. Currently on FK 1 mg BID and did not take prior to blood work today. Remains compliant with post transplant diet and crowd restrictions. Remains compliant with post transplant medications. \par \par On 9/12/19, patient presents for follow up visit status post Allo PBSCT on 2/13/19. Today is +211 days post transplant .Continues to complain of bilateral upper extremity pain and bone pain. Denies fever, chills, nausea, vomiting, diarrhea, rash, mouth sores or any signs of active bleeding. Denies SOB, chest pain or B/L LE edema. Currently on FK 1 mg AM and 0.5 mg in the PM and did not take prior to blood work today. Remains compliant with post transplant diet and crowd restrictions. Remains compliant with post transplant medications. \par \par On 10/10/19, patient presents for follow up visit status post Allo PBSCT on 2/13/19. Today is +239 days post transplant. Continues to complain of bilateral upper extremity pain, back pain. Pain is described as bone pain. On lyrica with no improvement. . Denies fever, chills, nausea, vomiting, diarrhea, rash, mouth sores or any signs of active bleeding. Denies SOB, chest pain or B/L LE edema. On a tacrolimus taper, currently taking 0.5 mg BID. Remains compliant with post transplant diet and crowd restrictions. Remains compliant with post transplant medications. \par \par On 11/4/19, patient presents for follow up visit status post Allo PBSCT on 2/13/19. Today is +264 days post transplant. Continues to complain of bilateral upper extremity pain and back pain. Pain is described as bone pain. On lyrica with no improvement. Patient states she is going to schedule a follow up appointment with pain management. Currently on FK taper. At this time is taking FK 0.5 mg BID Monday- Friday. Denies taking FK prior to blood work today. Denies fever, chills, nausea, vomiting, diarrhea, rash, mouth sores, dysuria or any signs of active bleeding. Denies SOB, chest pain or B/L LE edema. Using a cane today to walk. Remains compliant with post transplant diet and crowd restrictions. Remains compliant with post transplant medications.

## 2019-11-04 NOTE — ASSESSMENT
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 Relapsed ph pos ALL...s/p re induction with inotuzimab..Achieved CR. 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 \par \par 1) Ph+ ALL\par S/P Haploidentical PBSCT on 2/13/19 (son)\par 10/10/19 Fish for Y 100% donor\par Pending post transplant BMbx. Will scheudle Bm Bx December 2019.\par \par 2) Heme\par Counts stable, no indication for transfusion\par  WBC 10.6 ANC 3.3 Hgb 13.7 \par Continue folic acid and multivitamin\par \par 3) ID\par Continue ppx:\par - Mepron 750 mg BID\par - Fluconazole 200 mg oral tablet -- decreased to 1 tab(s) by mouth once a day on 4/24/19\par Post transplant restrictions reviewed \par \par CMV viremia\par Acyclovir switched to Valcyte 450 mg bid on 3/29/19\par Patient had mistakenly been taking acyclovir until 5/23/19, CMV PCR improved with starting Valcyte\par 10/10/19 CMV negative to date. Continue to monitor CMV PCR weekly\par Continue Valcyte 450 mg bid, decreased as of 6/12/19\par \par 4) GVHD\par Skin 0 Liver 0 GI 0 - overall grade 0\par No signs of acute/chronic GVHD\par FK taper started on 9/12/19. \par On 11/4/19, take FK 0.5 mg BID Monday- Thursday. On Friday 11/8/19 take FK 0.5 mg daily. Week of 11/11/19, take FK 0.5 mg BID Monday- Thursday and DO NOT Take FK on Friday 11/15/19.  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 Patient will make a follow up appointment with Pain Management.\par Hypomagnesia - magnesium oxide 400 mg tid\par Headaches - continue Keppra 500 mg bid\par Insomnia - continue PRN Ambien 5 mg q hs\par \par 8) Plan/Dispo\par Pt educated regarding plan of care, all questions/concerns addressed\par Instructed to contact our office with any new/worsening symptoms\par Will schedule a Bm Bx with Bella in December 2019.\par F/u in 2 weeks with NP Germaine  \par \par

## 2019-11-04 NOTE — REVIEW OF SYSTEMS
[Fatigue] : fatigue [Negative] : Allergic/Immunologic [Fever] : no fever [Chills] : no chills [Night Sweats] : no night sweats [Joint Pain] : no joint pain [Joint Stiffness] : no joint stiffness [Muscle Weakness] : no muscle weakness [Dizziness] : no dizziness [Fainting] : no fainting [FreeTextEntry5] : PICC line has been removed [FreeTextEntry9] : back and upper extremity bone pain [de-identified] : Using a cane to ambulate

## 2019-11-04 NOTE — PHYSICAL EXAM
[Ambulatory and capable of all self care but unable to carry out any work activities] : Status 2- Ambulatory and capable of all self care but unable to carry out any work activities. Up and about more than 50% of waking hours [Normal] : affect appropriate [de-identified] : picc line has been removed [de-identified] : stable gait

## 2019-11-05 LAB
ALBUMIN SERPL ELPH-MCNC: 4 G/DL
ALP BLD-CCNC: 429 U/L
ALT SERPL-CCNC: 31 U/L
ANION GAP SERPL CALC-SCNC: 11 MMOL/L
AST SERPL-CCNC: 23 U/L
BILIRUB SERPL-MCNC: 0.4 MG/DL
BUN SERPL-MCNC: 19 MG/DL
CALCIUM SERPL-MCNC: 10.7 MG/DL
CHLORIDE SERPL-SCNC: 108 MMOL/L
CMV DNA SPEC QL NAA+PROBE: NOT DETECTED
CMVPCR LOG: NOT DETECTED LOGIU/ML
CO2 SERPL-SCNC: 24 MMOL/L
CREAT SERPL-MCNC: 1.1 MG/DL
GLUCOSE SERPL-MCNC: 93 MG/DL
LDH SERPL-CCNC: 174 U/L
MAGNESIUM SERPL-MCNC: 2 MG/DL
POTASSIUM SERPL-SCNC: 4.3 MMOL/L
PROT SERPL-MCNC: 7 G/DL
SODIUM SERPL-SCNC: 143 MMOL/L
TACROLIMUS SERPL-MCNC: <2 NG/ML

## 2019-11-05 RX ORDER — FLUCONAZOLE 200 MG/1
200 TABLET ORAL DAILY
Qty: 30 | Refills: 0 | Status: DISCONTINUED | COMMUNITY
Start: 2019-03-04 | End: 2019-11-05

## 2019-11-06 LAB — CHROM ANALY INTERPHASE BLD FISH-IMP: SIGNIFICANT CHANGE UP

## 2019-11-14 ENCOUNTER — OUTPATIENT (OUTPATIENT)
Dept: OUTPATIENT SERVICES | Facility: HOSPITAL | Age: 59
LOS: 1 days | Discharge: HOME | End: 2019-11-14

## 2019-11-14 ENCOUNTER — OUTPATIENT (OUTPATIENT)
Dept: OUTPATIENT SERVICES | Facility: HOSPITAL | Age: 59
LOS: 1 days | End: 2019-11-14
Payer: MEDICARE

## 2019-11-14 DIAGNOSIS — Z41.9 ENCOUNTER FOR PROCEDURE FOR PURPOSES OTHER THAN REMEDYING HEALTH STATE, UNSPECIFIED: Chronic | ICD-10-CM

## 2019-11-14 DIAGNOSIS — Z98.89 OTHER SPECIFIED POSTPROCEDURAL STATES: Chronic | ICD-10-CM

## 2019-11-14 DIAGNOSIS — Z94.84 STEM CELLS TRANSPLANT STATUS: ICD-10-CM

## 2019-11-14 DIAGNOSIS — C91.00 ACUTE LYMPHOBLASTIC LEUKEMIA NOT HAVING ACHIEVED REMISSION: ICD-10-CM

## 2019-11-14 DIAGNOSIS — D17.9 BENIGN LIPOMATOUS NEOPLASM, UNSPECIFIED: Chronic | ICD-10-CM

## 2019-11-21 ENCOUNTER — APPOINTMENT (OUTPATIENT)
Dept: HEMATOLOGY ONCOLOGY | Facility: CLINIC | Age: 59
End: 2019-11-21
Payer: MEDICARE

## 2019-11-21 ENCOUNTER — RESULT REVIEW (OUTPATIENT)
Age: 59
End: 2019-11-21

## 2019-11-21 VITALS
WEIGHT: 223.99 LBS | HEART RATE: 78 BPM | TEMPERATURE: 97.9 F | SYSTOLIC BLOOD PRESSURE: 137 MMHG | BODY MASS INDEX: 39.93 KG/M2 | RESPIRATION RATE: 17 BRPM | DIASTOLIC BLOOD PRESSURE: 87 MMHG | OXYGEN SATURATION: 99 %

## 2019-11-21 DIAGNOSIS — M54.42 LUMBAGO WITH SCIATICA, LEFT SIDE: ICD-10-CM

## 2019-11-21 DIAGNOSIS — G89.29 LUMBAGO WITH SCIATICA, LEFT SIDE: ICD-10-CM

## 2019-11-21 DIAGNOSIS — M54.41 LUMBAGO WITH SCIATICA, LEFT SIDE: ICD-10-CM

## 2019-11-21 LAB
ALBUMIN SERPL ELPH-MCNC: 4 G/DL
ALP BLD-CCNC: 194 U/L
ALT SERPL-CCNC: 24 U/L
ANION GAP SERPL CALC-SCNC: 10 MMOL/L
AST SERPL-CCNC: 20 U/L
BASOPHILS # BLD AUTO: 0 K/UL — SIGNIFICANT CHANGE UP (ref 0–0.2)
BASOPHILS NFR BLD AUTO: 0.4 % — SIGNIFICANT CHANGE UP (ref 0–2)
BILIRUB SERPL-MCNC: 0.3 MG/DL
BUN SERPL-MCNC: 20 MG/DL
CALCIUM SERPL-MCNC: 10.5 MG/DL
CHLORIDE SERPL-SCNC: 110 MMOL/L
CO2 SERPL-SCNC: 22 MMOL/L
CREAT SERPL-MCNC: 0.94 MG/DL
EOSINOPHIL # BLD AUTO: 0.2 K/UL — SIGNIFICANT CHANGE UP (ref 0–0.5)
EOSINOPHIL NFR BLD AUTO: 1.7 % — SIGNIFICANT CHANGE UP (ref 0–6)
GLUCOSE SERPL-MCNC: 94 MG/DL
HCT VFR BLD CALC: 40.1 % — SIGNIFICANT CHANGE UP (ref 34.5–45)
HGB BLD-MCNC: 13.2 G/DL — SIGNIFICANT CHANGE UP (ref 11.5–15.5)
LDH SERPL-CCNC: 154 U/L
LYMPHOCYTES # BLD AUTO: 6 K/UL — HIGH (ref 1–3.3)
LYMPHOCYTES # BLD AUTO: 62.9 % — HIGH (ref 13–44)
MAGNESIUM SERPL-MCNC: 2 MG/DL
MCHC RBC-ENTMCNC: 32.8 PG — SIGNIFICANT CHANGE UP (ref 27–34)
MCHC RBC-ENTMCNC: 33 G/DL — SIGNIFICANT CHANGE UP (ref 32–36)
MCV RBC AUTO: 99.5 FL — SIGNIFICANT CHANGE UP (ref 80–100)
MONOCYTES # BLD AUTO: 0.4 K/UL — SIGNIFICANT CHANGE UP (ref 0–0.9)
MONOCYTES NFR BLD AUTO: 3.7 % — SIGNIFICANT CHANGE UP (ref 2–14)
NEUTROPHILS # BLD AUTO: 3 K/UL — SIGNIFICANT CHANGE UP (ref 1.8–7.4)
NEUTROPHILS NFR BLD AUTO: 31.4 % — LOW (ref 43–77)
PLATELET # BLD AUTO: 166 K/UL — SIGNIFICANT CHANGE UP (ref 150–400)
POTASSIUM SERPL-SCNC: 4.2 MMOL/L
PROT SERPL-MCNC: 6.8 G/DL
RBC # BLD: 4.03 M/UL — SIGNIFICANT CHANGE UP (ref 3.8–5.2)
RBC # FLD: 12.7 % — SIGNIFICANT CHANGE UP (ref 10.3–14.5)
SODIUM SERPL-SCNC: 142 MMOL/L
TACROLIMUS SERPL-MCNC: <2 NG/ML
WBC # BLD: 9.5 K/UL — SIGNIFICANT CHANGE UP (ref 3.8–10.5)
WBC # FLD AUTO: 9.5 K/UL — SIGNIFICANT CHANGE UP (ref 3.8–10.5)

## 2019-11-21 PROCEDURE — 88275 CYTOGENETICS 100-300: CPT

## 2019-11-21 PROCEDURE — 88271 CYTOGENETICS DNA PROBE: CPT

## 2019-11-21 PROCEDURE — 88237 TISSUE CULTURE BONE MARROW: CPT

## 2019-11-21 PROCEDURE — 99215 OFFICE O/P EST HI 40 MIN: CPT

## 2019-11-21 PROCEDURE — 88291 CYTO/MOLECULAR REPORT: CPT

## 2019-11-21 RX ORDER — VALGANCICLOVIR HYDROCHLORIDE 450 MG/1
450 TABLET ORAL
Qty: 180 | Refills: 0 | Status: ACTIVE | COMMUNITY
Start: 2019-03-29 | End: 1900-01-01

## 2019-11-21 RX ORDER — LIDOCAINE 5% 700 MG/1
5 PATCH TOPICAL
Qty: 30 | Refills: 0 | Status: DISCONTINUED | COMMUNITY
Start: 2019-08-29 | End: 2019-11-21

## 2019-11-21 RX ORDER — ONDANSETRON 8 MG/1
8 TABLET ORAL
Qty: 30 | Refills: 0 | Status: DISCONTINUED | COMMUNITY
Start: 2018-12-06 | End: 2019-11-21

## 2019-11-21 RX ORDER — FOLIC ACID 1 MG/1
1 TABLET ORAL DAILY
Qty: 30 | Refills: 3 | Status: ACTIVE | COMMUNITY
Start: 2019-03-04 | End: 1900-01-01

## 2019-11-21 RX ORDER — METOCLOPRAMIDE 10 MG/1
10 TABLET ORAL
Qty: 15 | Refills: 0 | Status: DISCONTINUED | COMMUNITY
Start: 2019-03-04 | End: 2019-11-21

## 2019-11-21 RX ORDER — TACROLIMUS 0.5 MG/1
0.5 CAPSULE ORAL
Qty: 60 | Refills: 0 | Status: ACTIVE | COMMUNITY
Start: 2019-03-04

## 2019-11-21 RX ORDER — TACROLIMUS 1 MG/1
1 CAPSULE ORAL
Qty: 60 | Refills: 0 | Status: DISCONTINUED | COMMUNITY
Start: 2019-03-04 | End: 2019-11-21

## 2019-11-22 LAB
CMV DNA SPEC QL NAA+PROBE: NOT DETECTED
CMVPCR LOG: NOT DETECTED LOGIU/ML

## 2019-11-25 LAB — CHROM ANALY INTERPHASE BLD FISH-IMP: SIGNIFICANT CHANGE UP

## 2019-11-27 DIAGNOSIS — C91.00 ACUTE LYMPHOBLASTIC LEUKEMIA NOT HAVING ACHIEVED REMISSION: ICD-10-CM

## 2019-11-27 DIAGNOSIS — Z94.84 STEM CELLS TRANSPLANT STATUS: ICD-10-CM

## 2019-12-01 PROCEDURE — G9005: CPT

## 2019-12-01 NOTE — PHYSICAL EXAM
[Ambulatory and capable of all self care but unable to carry out any work activities] : Status 2- Ambulatory and capable of all self care but unable to carry out any work activities. Up and about more than 50% of waking hours [Normal] : affect appropriate [de-identified] : picc line has been removed [de-identified] : stable gait

## 2019-12-01 NOTE — HISTORY OF PRESENT ILLNESS
[de-identified] : Ms. 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 Her induction course was complicated by tutu fever. She received cycle 2 mid may. BM BX prior was morphologically remission but pcr was positive. Cycle 4 was completed. Prolonged course with fever, pericardial effusion, possible fungal pna. I suspect fever and effusion due to sprycel. She also had neurologic / mental status changes after MTX. She has an omaya. She had two MTX IT. She feels well today.  s/p step 1 for stem cell mobilization..in CR1..S/P auto stem cell transplant with tam 200 mg/m2 prep...\par \par 10/4/18 - 11/20/18 : 58F hx HTN, Obesity, Ph(+) ALL s/p R Hypercavd x4 cycles IT MTX x2 s/p stem cell collection w/ Step 1(HD vp16 plus arac) stem cell mobilization regime. \par FISH and PCR negative. s/p Tam-Auto on 12/16/16. Patient prior to presentation had severe burning right abdominal pain with vesicular lesions and was diagnosed with shingles and received a 10 day course of antiviral medications. \par Patient on presentation had residual neuropathic pain.  Patient presented to the ER with severe occipital parietal headaches with nausea and vomiting. \par Patient also admitted to easy bruising mouth sores and dark stools. \par CT Head was done for complaints of headache which was (+) for SDH. Neurosurgery was consulted on initial detection of SDH, recommendations to keep platelets >80,000 was made.  LGIB was attributed to profound thrombocytopenia. 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 A PICC line was placed for chemotherapy and was initiated with Inotuzamab on Day 1, 8 and 15. IVF and Allopurinol for TLS. The patient has had refractory thrombocytopenia. H L A platelets were infused when they were available. when H L A platelets were not available 1/2 unit of platelet was infused over 3 hours. \par Follow up CT Head on 10/11 was stable and the patient continued to receive 1/2 units of platelets over 3 hours every 12 hours. The patient received last dose of Inotuzamab Cycle 1 on 10/22. On 10/15 patient was febrile,  a CT of the chest/abd/pelvis was done which showed scattered patchy ground glass opacities in right upper and lower lobes, suggestive of infection, patient received a course of IV antibiotics for Pneumonia. On 11/2 Blood cultures were found to be (+) for Co-agulase (-) Staph and patient received a course of Vancomycin. \par Repeat CT Head was completed on 10/23 for follow up and showed some new bleeding into previous collection. Findings were discussed with Neuro Surgery. \par HLA platelets were administered when available for refractory thrombocytopenia. \par Cycle 2 Inotuzumab was started on 11/6 which was given on Days 1, 8, 15. \par Patient tolerated second cycle without any adverse reactions. Patient for possible future Haploidentical Allogeneic stem cell transplant after discharge. \par Pre-BMT testing completed prior to discharge: Echo, MUGA, Xray sinuses, 24-Hr Urine for Creatinine. \par Patient had intermittent severe headaches on 11/3 and 11/15, repeat CTs showed nearly resolved hemorrhage. Headaches were managed with analgesics Fentanyl patch 37 mcg/hr and Oxycodone IR 15 mg q3 prn prior to discharge home. She is on ponatinib QOD b/c of increased LFT's.  \par \par Patient underwent a haploidentical PBSCT on 2/13/19 (son), hospital course as follows:\par Ms. 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 Her transplant course was complicated by shingles. In October, 2018 she relapsed (confirmed via flow cytometry). She was treated with inotuzumab x 2 cycles. Her course was complicated by subdural hemorrhage, refractory thrombocytopenia requiring HLA matched platelets, pneumonia, and coag negative staph bacteremia (treated with vancomycin). She is now admitted for a haplo-identical peripheral blood stem cell transplant from her son. \par \par Upon admission, a TLC was placed in IR. Ms. Galeas received IV hydration, pain management, antiemetics, nutritional support, antiviral / antibacterial / antifungal / PCP / GI / VOD (SOS) prophylaxis. Labs were monitored on a daily basis, and she received electrolyte repletion and transfusional support as needed. \par \par Ms. Galeas had a relatively uncomplicated transplant course. A baseline CT of the head was done on admission given her history of SDH and refractory thrombocytopenia. The baseline CT was negative. Ms. Galeas did experience pancytopenia related to the high dose chemotherapy preparative regimen. Her thrombocytopenia was refractory, and was managed by infusing 1/2 unit of platelets twice daily over 3 hours. Ms. Galeas also experienced neutropenic fevers. When she became neutropenic, she was started on prophylactic ciprofloxacin. When she developed fevers, blood and urine cultures were sent, a CXR completed and the ciprofloxacin was changed to cefepime. Her cultures and CXR remained negative. \par \par On 2/13/19, Ms. Galeas received 280ml of fresh, mobilized, haplo-identical, HPC apheresis over approximately 1 hour. Cell counts as follows: \par Total MNCs (x 10^8/kg) = 4.36 \par CD34+ cells (x 10^6/kg) = 7.78 \par Cell Viability (%) = 100% \par \par Engraftment was noted on 2/27/19. Post engraftment, the cefepime and zarxio were discontinued. Tacrolimus levels and CMV PCR were monitored twice weekly. A FISH for Y was sent to determine chimerism, with results pending. 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 Patient states she is doing well overall. She reports a recent illness with chills and fever, which has since resolved. She c/o continued bone pain. Currently on tacrolimus 1mg BID and valcyte 450 mg BID. Prednisone was discontinued on 6/26/19. Right PICC line has been removed. Denies fever, chills, nausea, vomiting, diarrhea, rash, mouth sores, dysuria or any signs of active bleeding. Denies SOB, chest pain or B/L LE edema. Remains compliant with post transplant diet and crowd restrictions.\par \par On 8/29/19, patient presents for follow up visit status post Allo PBSCT on 2/13/19. Today is +197 days post transplant. Continues to complain of bilateral upper extremity pain and bone pain. Patient has been taking oxycodone prn. Denies fever, chills, nausea, vomiting, diarrhea, rash, mouth sores or any signs of active bleeding. Denies SOB, chest pain or B/L LE edema. Currently on FK 1 mg BID and did not take prior to blood work today. Remains compliant with post transplant diet and crowd restrictions. Remains compliant with post transplant medications. \par \par On 9/12/19, patient presents for follow up visit status post Allo PBSCT on 2/13/19. Today is +211 days post transplant .Continues to complain of bilateral upper extremity pain and bone pain. Denies fever, chills, nausea, vomiting, diarrhea, rash, mouth sores or any signs of active bleeding. Denies SOB, chest pain or B/L LE edema. Currently on FK 1 mg AM and 0.5 mg in the PM and did not take prior to blood work today. Remains compliant with post transplant diet and crowd restrictions. Remains compliant with post transplant medications. \par \par On 10/10/19, patient presents for follow up visit status post Allo PBSCT on 2/13/19. Today is +239 days post transplant. Continues to complain of bilateral upper extremity pain, back pain. Pain is described as bone pain. On lyrica with no improvement. . Denies fever, chills, nausea, vomiting, diarrhea, rash, mouth sores or any signs of active bleeding. Denies SOB, chest pain or B/L LE edema. On a tacrolimus taper, currently taking 0.5 mg BID. Remains compliant with post transplant diet and crowd restrictions. Remains compliant with post transplant medications. \par \par On 11/4/19, patient presents for follow up visit status post Allo PBSCT on 2/13/19. Today is +264 days post transplant. Continues to complain of bilateral upper extremity pain and back pain. Pain is described as bone pain. On lyrica with no improvement. Patient states she is going to schedule a follow up appointment with pain management. Currently on FK taper. At this time is taking FK 0.5 mg BID Monday- Friday. Denies taking FK prior to blood work today. Denies fever, chills, nausea, vomiting, diarrhea, rash, mouth sores, dysuria or any signs of active bleeding. Denies SOB, chest pain or B/L LE edema. Using a cane today to walk. Remains compliant with post transplant diet and crowd restrictions. Remains compliant with post transplant medications. \par \par On 11/21/19,  patient presents for follow up visit status post Allo PBSCT on 2/13/19. Today is +281 days post transplant. Overall doing well and offers no acute concerns. Currently on tacrolimus taper and is taking FK 0.5 mg BID Mon-Thur. Denies fever, chills, nausea, vomiting, diarrhea, rash, mouth sores, dysuria or any signs of active bleeding. Denies SOB, chest pain or B/L LE edema. Using a cane today to walk. Remains compliant with post transplant diet and crowd restrictions. Remains compliant with post transplant medications.

## 2019-12-01 NOTE — REVIEW OF SYSTEMS
[Fatigue] : fatigue [Negative] : Allergic/Immunologic [Chills] : no chills [Fever] : no fever [Night Sweats] : no night sweats [Joint Pain] : no joint pain [Joint Stiffness] : no joint stiffness [Muscle Weakness] : no muscle weakness [Dizziness] : no dizziness [Fainting] : no fainting [FreeTextEntry5] : PICC line has been removed [FreeTextEntry9] : back and upper extremity bone pain [de-identified] : Using a cane to ambulate

## 2019-12-01 NOTE — ASSESSMENT
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 Relapsed ph pos ALL...s/p re induction with inotuzimab..Achieved CR. 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 \par \par 1) Ph+ ALL\par S/P Haploidentical PBSCT on 2/13/19 (son)\par 11/4/19 Fish for Y 100% donor\par Pending post transplant BMbx. Will scheudle Bm Bx December 2019.\par \par 2) Heme\par Counts stable, no indication for transfusion\par  WBC 9.5 ANC 3 Hgb 13.2 \par Continue folic acid and multivitamin\par \par 3) ID\par Continue ppx:\par - Mepron 750 mg BID\par - Fluconazole 200 mg oral tablet -- decreased to 1 tab(s) by mouth once a day on 4/24/19\par Post transplant restrictions reviewed \par \par CMV viremia\par Acyclovir switched to Valcyte 450 mg bid on 3/29/19\par Patient had mistakenly been taking acyclovir until 5/23/19, CMV PCR improved with starting Valcyte\par 11/21/19 CMV negative to date. Continue to monitor CMV PCR weekly\par Continue Valcyte 450 mg bid, decreased as of 6/12/19\par \par 4) GVHD\par Skin 0 Liver 0 GI 0 - overall grade 0\par No signs of acute/chronic GVHD\par FK taper started on 9/12/19. \par Currently taking FK 0.5 mg BID Mon-Thursday. 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 Patient will make a follow up appointment with Pain Management.\par Hypomagnesia - magnesium oxide 400 mg tid\par Headaches - continue Keppra 500 mg bid\par Insomnia - continue PRN Ambien 5 mg q hs\par \par 8) Plan/Dispo\par Pt educated regarding plan of care, all questions/concerns addressed\par Instructed to contact our office with any new/worsening symptoms\par Will schedule a Bm Bx with Bella in December 2019.\par F/u in 2 weeks with MD Bentley. \par \par

## 2019-12-09 ENCOUNTER — OUTPATIENT (OUTPATIENT)
Dept: OUTPATIENT SERVICES | Facility: HOSPITAL | Age: 59
LOS: 1 days | End: 2019-12-09
Payer: MEDICARE

## 2019-12-09 DIAGNOSIS — D17.9 BENIGN LIPOMATOUS NEOPLASM, UNSPECIFIED: Chronic | ICD-10-CM

## 2019-12-09 DIAGNOSIS — Z41.9 ENCOUNTER FOR PROCEDURE FOR PURPOSES OTHER THAN REMEDYING HEALTH STATE, UNSPECIFIED: Chronic | ICD-10-CM

## 2019-12-09 DIAGNOSIS — C91.00 ACUTE LYMPHOBLASTIC LEUKEMIA NOT HAVING ACHIEVED REMISSION: ICD-10-CM

## 2019-12-09 DIAGNOSIS — Z98.89 OTHER SPECIFIED POSTPROCEDURAL STATES: Chronic | ICD-10-CM

## 2019-12-10 ENCOUNTER — APPOINTMENT (OUTPATIENT)
Dept: HEMATOLOGY ONCOLOGY | Facility: CLINIC | Age: 59
End: 2019-12-10

## 2019-12-11 ENCOUNTER — RESULT REVIEW (OUTPATIENT)
Age: 59
End: 2019-12-11

## 2019-12-11 ENCOUNTER — LABORATORY RESULT (OUTPATIENT)
Age: 59
End: 2019-12-11

## 2019-12-11 ENCOUNTER — APPOINTMENT (OUTPATIENT)
Dept: HEMATOLOGY ONCOLOGY | Facility: CLINIC | Age: 59
End: 2019-12-11

## 2019-12-11 ENCOUNTER — APPOINTMENT (OUTPATIENT)
Dept: INFUSION THERAPY | Facility: HOSPITAL | Age: 59
End: 2019-12-11

## 2019-12-11 VITALS
BODY MASS INDEX: 39.93 KG/M2 | TEMPERATURE: 98 F | DIASTOLIC BLOOD PRESSURE: 80 MMHG | HEART RATE: 78 BPM | RESPIRATION RATE: 16 BRPM | OXYGEN SATURATION: 99 % | SYSTOLIC BLOOD PRESSURE: 130 MMHG | WEIGHT: 223.99 LBS

## 2019-12-11 LAB
BASOPHILS # BLD AUTO: 0.1 K/UL — SIGNIFICANT CHANGE UP (ref 0–0.2)
BASOPHILS NFR BLD AUTO: 1 % — SIGNIFICANT CHANGE UP (ref 0–2)
EOSINOPHIL # BLD AUTO: 0.1 K/UL — SIGNIFICANT CHANGE UP (ref 0–0.5)
EOSINOPHIL NFR BLD AUTO: 1 % — SIGNIFICANT CHANGE UP (ref 0–6)
HCT VFR BLD CALC: 40.4 % — SIGNIFICANT CHANGE UP (ref 34.5–45)
HGB BLD-MCNC: 13.4 G/DL — SIGNIFICANT CHANGE UP (ref 11.5–15.5)
LYMPHOCYTES # BLD AUTO: 58 % — HIGH (ref 13–44)
LYMPHOCYTES # BLD AUTO: 7 K/UL — HIGH (ref 1–3.3)
MCHC RBC-ENTMCNC: 32.9 PG — SIGNIFICANT CHANGE UP (ref 27–34)
MCHC RBC-ENTMCNC: 33.1 G/DL — SIGNIFICANT CHANGE UP (ref 32–36)
MCV RBC AUTO: 99.3 FL — SIGNIFICANT CHANGE UP (ref 80–100)
MONOCYTES # BLD AUTO: 0.4 K/UL — SIGNIFICANT CHANGE UP (ref 0–0.9)
MONOCYTES NFR BLD AUTO: 4 % — SIGNIFICANT CHANGE UP (ref 2–14)
NEUTROPHILS # BLD AUTO: 4.3 K/UL — SIGNIFICANT CHANGE UP (ref 1.8–7.4)
NEUTROPHILS NFR BLD AUTO: 36 % — LOW (ref 43–77)
PLAT MORPH BLD: NORMAL — SIGNIFICANT CHANGE UP
PLATELET # BLD AUTO: 161 K/UL — SIGNIFICANT CHANGE UP (ref 150–400)
RBC # BLD: 4.07 M/UL — SIGNIFICANT CHANGE UP (ref 3.8–5.2)
RBC # FLD: 12.7 % — SIGNIFICANT CHANGE UP (ref 10.3–14.5)
RBC BLD AUTO: SIGNIFICANT CHANGE UP
WBC # BLD: 11.9 K/UL — HIGH (ref 3.8–10.5)
WBC # FLD AUTO: 11.9 K/UL — HIGH (ref 3.8–10.5)

## 2019-12-11 PROCEDURE — G0452: CPT | Mod: 26

## 2019-12-11 PROCEDURE — 88313 SPECIAL STAINS GROUP 2: CPT | Mod: 26

## 2019-12-11 PROCEDURE — 88291 CYTO/MOLECULAR REPORT: CPT | Mod: 59

## 2019-12-11 PROCEDURE — 88342 IMHCHEM/IMCYTCHM 1ST ANTB: CPT | Mod: 26,59

## 2019-12-11 PROCEDURE — 88305 TISSUE EXAM BY PATHOLOGIST: CPT | Mod: 26

## 2019-12-11 PROCEDURE — 88341 IMHCHEM/IMCYTCHM EA ADD ANTB: CPT | Mod: 26

## 2019-12-11 PROCEDURE — 85097 BONE MARROW INTERPRETATION: CPT

## 2019-12-11 PROCEDURE — 88189 FLOWCYTOMETRY/READ 16 & >: CPT

## 2019-12-11 NOTE — REASON FOR VISIT
[Bone Marrow Aspiration] : bone marrow aspiration [Bone Marrow Biopsy] : bone marrow biopsy [FreeTextEntry2] : Relapse Ph+ ALL s/p tam Auto SCT on 12/16/2016, HAPLO ID PBSCT 2/13/2019. Post transplant BMBx.

## 2019-12-11 NOTE — PROCEDURE
[Bone Marrow Biopsy] : bone marrow biopsy [Bone Marrow Aspiration] : bone marrow aspiration  [Patient] : the patient [Correct positioning] : correct positioning [Patient identification verified] : patient identification verified [Procedure verified and consent obtained] : procedure verified and consent obtained [Prone] : prone [The right posterior iliac crest was prepped with betadine and draped, using sterile technique.] : The right posterior iliac crest was prepped with betadine and draped, using sterile technique. [Lidocaine was injected and into the periosteum overlying the site.] : Lidocaine was injected and into the periosteum overlying the site. [Aspirate] : aspirate [Cytogenetics] : cytogenetics [FISH] : FISH [Biopsy] : biopsy [Flow Cytometry] : flow cytometry [] : The patient was instructed to remove the bandage the following AM. The patient may bathe. Acetaminophen may be taken for discomfort, as per package directions.If there are any other problems, the patient was instructed to call the office. The patient verbalized understanding, and is aware of the office contact numbers. [FreeTextEntry1] : Relapse Ph+ ALL s/p tam Auto SCT on 12/16/2016, HAPLO ID PBSCT 2/13/2019. Post transplant BMBx. [FreeTextEntry2] : CBC prior to procedure\par WBC 11.9\par Hgb  13.4   Hct 40.4\par Plt 161\par BM Bx and aspiration was performed by [facility] [place]. A extended snaring needle utilized successfully to obtain core bx. \par Pt premed w/ Diludid, tolerated procedure well. \par

## 2019-12-12 ENCOUNTER — RESULT REVIEW (OUTPATIENT)
Age: 59
End: 2019-12-12

## 2019-12-12 DIAGNOSIS — R52 PAIN, UNSPECIFIED: ICD-10-CM

## 2019-12-13 LAB
HEMATOPATHOLOGY REPORT: SIGNIFICANT CHANGE UP
TM INTERPRETATION: SIGNIFICANT CHANGE UP

## 2019-12-13 NOTE — ADDENDUM
[FreeTextEntry1] : Documented by Eugenio Strickland acting as a scribe for Dr. Luke Bentley on 12/11/2019 \par All medical record entries made by the Scribe were at my, Dr. Luke Bentley, direction and personally dictated by me on 12/11/2019. I have reviewed the chart and agree that the record accurately reflects my personal performance of the history, physical exam, assessment and plan. I have also personally directed, reviewed, and agree with the discharge instructions.\par \par \par

## 2019-12-13 NOTE — HISTORY OF PRESENT ILLNESS
[de-identified] : Ms. 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 Her induction course was complicated by tutu fever. She received cycle 2 mid may. BM BX prior was morphologically remission but pcr was positive. Cycle 4 was completed. Prolonged course with fever, pericardial effusion, possible fungal pna. I suspect fever and effusion due to sprycel. She also had neurologic / mental status changes after MTX. She has an omaya. She had two MTX IT. She feels well today.  s/p step 1 for stem cell mobilization..in CR1..S/P auto stem cell transplant with tam 200 mg/m2 prep...\par \par 10/4/18 - 11/20/18 : 58F hx HTN, Obesity, Ph(+) ALL s/p R Hypercavd x4 cycles IT MTX x2 s/p stem cell collection w/ Step 1(HD vp16 plus arac) stem cell mobilization regime. \par FISH and PCR negative. s/p Tam-Auto on 12/16/16. Patient prior to presentation had severe burning right abdominal pain with vesicular lesions and was diagnosed with shingles and received a 10 day course of antiviral medications. \par Patient on presentation had residual neuropathic pain.  Patient presented to the ER with severe occipital parietal headaches with nausea and vomiting. \par Patient also admitted to easy bruising mouth sores and dark stools. \par CT Head was done for complaints of headache which was (+) for SDH. Neurosurgery was consulted on initial detection of SDH, recommendations to keep platelets >80,000 was made.  LGIB was attributed to profound thrombocytopenia. 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 A PICC line was placed for chemotherapy and was initiated with Inotuzamab on Day 1, 8 and 15. IVF and Allopurinol for TLS. The patient has had refractory thrombocytopenia. H L A platelets were infused when they were available. when H L A platelets were not available 1/2 unit of platelet was infused over 3 hours. \par Follow up CT Head on 10/11 was stable and the patient continued to receive 1/2 units of platelets over 3 hours every 12 hours. The patient received last dose of Inotuzamab Cycle 1 on 10/22. On 10/15 patient was febrile,  a CT of the chest/abd/pelvis was done which showed scattered patchy ground glass opacities in right upper and lower lobes, suggestive of infection, patient received a course of IV antibiotics for Pneumonia. On 11/2 Blood cultures were found to be (+) for Co-agulase (-) Staph and patient received a course of Vancomycin. \par Repeat CT Head was completed on 10/23 for follow up and showed some new bleeding into previous collection. Findings were discussed with Neuro Surgery. \par HLA platelets were administered when available for refractory thrombocytopenia. \par Cycle 2 Inotuzumab was started on 11/6 which was given on Days 1, 8, 15. \par Patient tolerated second cycle without any adverse reactions. Patient for possible future Haploidentical Allogeneic stem cell transplant after discharge. \par Pre-BMT testing completed prior to discharge: Echo, MUGA, Xray sinuses, 24-Hr Urine for Creatinine. \par Patient had intermittent severe headaches on 11/3 and 11/15, repeat CTs showed nearly resolved hemorrhage. Headaches were managed with analgesics Fentanyl patch 37 mcg/hr and Oxycodone IR 15 mg q3 prn prior to discharge home. She is on ponatinib QOD b/c of increased LFT's.  \par \par Patient underwent a haploidentical PBSCT on 2/13/19 (son), hospital course as follows:\par Ms. 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 Her transplant course was complicated by shingles. In October, 2018 she relapsed (confirmed via flow cytometry). She was treated with inotuzumab x 2 cycles. Her course was complicated by subdural hemorrhage, refractory thrombocytopenia requiring HLA matched platelets, pneumonia, and coag negative staph bacteremia (treated with vancomycin). She is now admitted for a haplo-identical peripheral blood stem cell transplant from her son. \par \par Upon admission, a TLC was placed in IR. Ms. Galeas received IV hydration, pain management, antiemetics, nutritional support, antiviral / antibacterial / antifungal / PCP / GI / VOD (SOS) prophylaxis. Labs were monitored on a daily basis, and she received electrolyte repletion and transfusional support as needed. \par \par Ms. Galeas had a relatively uncomplicated transplant course. A baseline CT of the head was done on admission given her history of SDH and refractory thrombocytopenia. The baseline CT was negative. Ms. Galeas did experience pancytopenia related to the high dose chemotherapy preparative regimen. Her thrombocytopenia was refractory, and was managed by infusing 1/2 unit of platelets twice daily over 3 hours. Ms. Galeas also experienced neutropenic fevers. When she became neutropenic, she was started on prophylactic ciprofloxacin. When she developed fevers, blood and urine cultures were sent, a CXR completed and the ciprofloxacin was changed to cefepime. Her cultures and CXR remained negative. \par \par On 2/13/19, Ms. Galeas received 280ml of fresh, mobilized, haplo-identical, HPC apheresis over approximately 1 hour. Cell counts as follows: \par Total MNCs (x 10^8/kg) = 4.36 \par CD34+ cells (x 10^6/kg) = 7.78 \par Cell Viability (%) = 100% \par \par Engraftment was noted on 2/27/19. Post engraftment, the cefepime and zarxio were discontinued. Tacrolimus levels and CMV PCR were monitored twice weekly. A FISH for Y was sent to determine chimerism, with results pending. 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 Patient states she is doing well overall. She reports a recent illness with chills and fever, which has since resolved. She c/o continued bone pain. Currently on tacrolimus 1mg BID and valcyte 450 mg BID. Prednisone was discontinued on 6/26/19. Right PICC line has been removed. Denies fever, chills, nausea, vomiting, diarrhea, rash, mouth sores, dysuria or any signs of active bleeding. Denies SOB, chest pain or B/L LE edema. Remains compliant with post transplant diet and crowd restrictions.

## 2019-12-13 NOTE — ASSESSMENT
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  Recent BM BX was c/w remission. Fish and pcr were negative. Step 1 complicated by hospitalization for neutropenic fevers and c-diff diarrhea 11/1-11/11. \par Was able to successfully collect her stem cell while inpt on 11/8 and 11/9. \par Now s/p Masha-Auto on 12/16/16. Hospital course complicated by pancytopenia and fungal PNA. 12/22 CT chest revealed new 1cm right lung nodule and was started on voriconazole. Also +RVP for rhinovirus/enterovirus. Demonstrated engraftment on 12/26 and d/c'ed in stable condition on 1/3/17.\par \par Now relapsed ph pos ALL......s/p re induction with inotuzimab...on Ponatinib to deepen response.....f/u fish and pcr\par Peripheral blood work stable and discussed with patient. PCR remains positive on pb...hx of subdural...no HA. \par Continued  Ponatinib 45 mg QOD  ...achieved CR.... discussed rationale, risk, benefits and side effects of therapy. Patient verbalized understanding and expressed agreement with treatment plan. 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 Engraftment was noted on 2/27/19. 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 DIscontinued as of 6/26/19.\par acyclovir- discontinued and switched to Valcyte 450mg bid on 3/29/19- pt had been mistakenly taking acyclovir until 5/23/19- now on Valcyte 900 mg BID -decreased to 450mg BID as of 6/12/19.\par oxyCODONE 5 mg oral tablet -- 1 tab(s) by mouth every 6 hours, As Needed MDD:4 tabs -- Indication: For As needed for pain \par Cozaar 100 mg oral tablet -- 1 tab(s) by mouth once a day -- Indication: For HTN \par levETIRAcetam 500 mg oral tablet -- 1 tab(s) by mouth 2 times a day -- Indication: For Anti-seizure medication \par ondansetron 8 mg oral tablet -- 1 tab(s) by mouth 3 times a day, As Needed -- Indication: For As needed for nausea \par metoclopramide 10 mg oral tablet -- 1 tab(s) by mouth 4 times a day (before meals and at bedtime), As Needed -- Indication: For As needed for nausea \par fluconazole 200 mg oral tablet -- decreased to 1 tab(s) by mouth once a day on 4/24/19 -- Indication: For Antifungal prophylaxis  \par amLODIPine 5 mg oral tablet -- 1 tab(s) by mouth once a day -- Indication: For HTN \par Actigall 300 mg oral capsule -- 1 cap(s) by mouth 2 times a day -- Indication: For VOD (SOS) prophylaxis \par vancomycin- d/c'd 5/10/19 with resolution of diarrhea\par mycophenolate mofetil- discontinued while inpatient \par magnesium oxide 400 mg (241.3 mg elemental magnesium) oral tablet -- 1 tab(s) by mouth 3 times a day (with meals) -- Indication: For Supplement \par atovaquone 750 mg/5 mL oral suspension -- 5 milliliter(s) by mouth 2 times a day -- Indication: For PCP prophylaxis \par pantoprazole 40 mg oral delayed release tablet -- 1 tab(s) by mouth once a day (before a meal) -- Indication: For GI prophylaxis \par Multiple Vitamins oral tablet -- 1 tab(s) by mouth once a day -- Indication: For Supplement \par folic acid 1 mg oral tablet -- 1 tab(s) by mouth once a day -- Indication: For Supplement. \par \par Continue medications as instructed. \par Peripheral blood work reviewed and discussed with patient.\par Labs sent today for CMP, LDH, Mg, CMV-PCR, Tacrolimus level. Chimerism 100% 7/16/19.\par Current FK dose: 1mg BID. FK level pending, will contact pt to change dose if needed. Plan to taper FK in 2 weeks. 1 mg in am and 0.5 mg in pm\par Maintain post-transplant diet and crowd restrictions. \par Patient advised to monitor for signs and symptoms of GVHD including but not limited to fever over 101, rash, nausea, vomiting, severe diarrhea, eye pain/dryness. - advised to contact immediately with any developing or worsening signs/symptoms. \par Continue proper picc maintenance- flushing daily. Plan to remove PICC line after maintaining appointments every 4 weeks. PICC line has been removed.\par Well care stressed, questions addressed, support provided.\par RTC to f/u with QUAN Herron in 2 weeks. RTC to f/u with Dr. Bentley in 4 weeks.

## 2019-12-13 NOTE — REVIEW OF SYSTEMS
[Fatigue] : fatigue [Negative] : Heme/Lymph [FreeTextEntry2] : illness, chills, fever-resolved [Muscle Weakness] : no muscle weakness [FreeTextEntry9] : bone pain [FreeTextEntry5] : PICC line has been removed

## 2019-12-13 NOTE — PHYSICAL EXAM
[Ambulatory and capable of all self care but unable to carry out any work activities] : Status 2- Ambulatory and capable of all self care but unable to carry out any work activities. Up and about more than 50% of waking hours [Normal] : affect appropriate [de-identified] : picc line has been removed [de-identified] : stable gait

## 2019-12-16 LAB — MISCELLANEOUS TEST NAME: SIGNIFICANT CHANGE UP

## 2019-12-17 LAB — BCR/ABL BY RT - PCR QUANTITATIVE: SIGNIFICANT CHANGE UP

## 2019-12-19 LAB — CHROM ANALY INTERPHASE BLD FISH-IMP: SIGNIFICANT CHANGE UP

## 2019-12-23 LAB — CHROM ANALY OVERALL INTERP SPEC-IMP: SIGNIFICANT CHANGE UP

## 2019-12-24 ENCOUNTER — MEDICATION RENEWAL (OUTPATIENT)
Age: 59
End: 2019-12-24

## 2019-12-24 RX ORDER — URSODIOL 300 MG/1
300 CAPSULE ORAL
Qty: 60 | Refills: 4 | Status: ACTIVE | COMMUNITY
Start: 2019-02-01 | End: 1900-01-01

## 2019-12-24 RX ORDER — MULTIVITAMIN
TABLET ORAL DAILY
Qty: 30 | Refills: 3 | Status: ACTIVE | COMMUNITY
Start: 2019-03-04 | End: 1900-01-01

## 2019-12-27 ENCOUNTER — OUTPATIENT (OUTPATIENT)
Dept: OUTPATIENT SERVICES | Facility: HOSPITAL | Age: 59
LOS: 1 days | Discharge: HOME | End: 2019-12-27

## 2019-12-27 DIAGNOSIS — Z41.9 ENCOUNTER FOR PROCEDURE FOR PURPOSES OTHER THAN REMEDYING HEALTH STATE, UNSPECIFIED: Chronic | ICD-10-CM

## 2019-12-27 DIAGNOSIS — D17.9 BENIGN LIPOMATOUS NEOPLASM, UNSPECIFIED: Chronic | ICD-10-CM

## 2019-12-27 DIAGNOSIS — Z98.89 OTHER SPECIFIED POSTPROCEDURAL STATES: Chronic | ICD-10-CM

## 2019-12-27 DIAGNOSIS — C91.00 ACUTE LYMPHOBLASTIC LEUKEMIA NOT HAVING ACHIEVED REMISSION: ICD-10-CM

## 2019-12-27 DIAGNOSIS — Z94.84 STEM CELLS TRANSPLANT STATUS: ICD-10-CM

## 2020-01-01 NOTE — CONSULT NOTE ADULT - PROBLEM SELECTOR PROBLEM 2
Reason for visit: Maternal education/ initial lactation consult    Breastfeeding experience/Education: G1, first baby, took breastfeeding class    Mother's Breastfeeding Goal: Will try for 9m-1yr    Health History (pertinent to milk production): WNL, no health issues    Breast Assessment: Semi firm, small bilaterally equal breasts with no flat or inverted nipples.     Feeding Assessment:  Infant unswaddled and put to breast by LC. Helped Mom position baby in football hold and then cross cradle hold and educated on hand placement and support of breast. Mom a little awkward with handling baby but able to apply education. Infant able to suck well and strong on gloved finger but unable to start engaging in feeding when on the breast. Mom educated on hand expression and able to express several large drops of colostrum into baby's mouth. After a long attempt, suggested some suck training with nipple shield. Education on risks and benefits and cleaning instructions given. Applied shield, infant still not able to start sucking despite lots of stimulation and repositioning. Encouraged to watch for feeding cues and try again and of course call for assistance as needed. Reassured Mom this is normal infant behavior to be sleeping.     Feeding Plan of Care:  Feed on demand plus 8-12 times in 24 hrs/ call for assistance as needed.     Education: Lactation services introduced to pt. Educated on the following: A New Beginning breastfeeding section, 8-12 feedings minimum per 24 hours x first 2 weeks, Reinforce sore nipple management, Feed on first breast without time restriction, offer second breast, Observe visible suck/ audible swallow, Engorgement - Breastfeeding: heat, massage, expression, ice, frequent nursing, Ibuprofen if prescribed, Correct latch on and positioning, Manual expression, Pumping, How to tell if infant getting enough and Use of sterilizer bag after discharge, cleaning and sanitization of pump parts. Educated on  nipple shield. Mom has her own Medela pump with her.     Time spent in room: 55 min           Melissa Johnson RN, BSN, IBCLC     SDH (subdural hematoma)

## 2020-01-02 ENCOUNTER — RESULT REVIEW (OUTPATIENT)
Age: 60
End: 2020-01-02

## 2020-01-02 ENCOUNTER — APPOINTMENT (OUTPATIENT)
Dept: HEMATOLOGY ONCOLOGY | Facility: CLINIC | Age: 60
End: 2020-01-02
Payer: COMMERCIAL

## 2020-01-02 VITALS
OXYGEN SATURATION: 97 % | HEART RATE: 105 BPM | BODY MASS INDEX: 38.44 KG/M2 | SYSTOLIC BLOOD PRESSURE: 111 MMHG | TEMPERATURE: 97.8 F | RESPIRATION RATE: 16 BRPM | WEIGHT: 215.61 LBS | DIASTOLIC BLOOD PRESSURE: 76 MMHG

## 2020-01-02 LAB
ALBUMIN SERPL ELPH-MCNC: 4.3 G/DL
ALP BLD-CCNC: 181 U/L
ALT SERPL-CCNC: 46 U/L
ANION GAP SERPL CALC-SCNC: 12 MMOL/L
AST SERPL-CCNC: 33 U/L
BASOPHILS # BLD AUTO: 0.3 K/UL — HIGH (ref 0–0.2)
BILIRUB SERPL-MCNC: 0.4 MG/DL
BUN SERPL-MCNC: 22 MG/DL
CALCIUM SERPL-MCNC: 10.5 MG/DL
CHLORIDE SERPL-SCNC: 108 MMOL/L
CO2 SERPL-SCNC: 19 MMOL/L
CREAT SERPL-MCNC: 1.27 MG/DL
EOSINOPHIL # BLD AUTO: 0 K/UL — SIGNIFICANT CHANGE UP (ref 0–0.5)
GLUCOSE SERPL-MCNC: 99 MG/DL
HCT VFR BLD CALC: 46 % — HIGH (ref 34.5–45)
HGB BLD-MCNC: 15.4 G/DL — SIGNIFICANT CHANGE UP (ref 11.5–15.5)
LDH SERPL-CCNC: 205 U/L
LYMPHOCYTES # BLD AUTO: 10 K/UL — HIGH (ref 1–3.3)
LYMPHOCYTES # BLD AUTO: 67 % — HIGH (ref 13–44)
MAGNESIUM SERPL-MCNC: 2 MG/DL
MCHC RBC-ENTMCNC: 33.3 PG — SIGNIFICANT CHANGE UP (ref 27–34)
MCHC RBC-ENTMCNC: 33.5 G/DL — SIGNIFICANT CHANGE UP (ref 32–36)
MCV RBC AUTO: 99.2 FL — SIGNIFICANT CHANGE UP (ref 80–100)
MONOCYTES # BLD AUTO: 0.5 K/UL — SIGNIFICANT CHANGE UP (ref 0–0.9)
MONOCYTES NFR BLD AUTO: 2 % — SIGNIFICANT CHANGE UP (ref 2–14)
NEUTROPHILS # BLD AUTO: 3.8 K/UL — SIGNIFICANT CHANGE UP (ref 1.8–7.4)
NEUTROPHILS NFR BLD AUTO: 31 % — LOW (ref 43–77)
PLAT MORPH BLD: NORMAL — SIGNIFICANT CHANGE UP
PLATELET # BLD AUTO: 221 K/UL — SIGNIFICANT CHANGE UP (ref 150–400)
POTASSIUM SERPL-SCNC: 4.4 MMOL/L
PROT SERPL-MCNC: 7.8 G/DL
RBC # BLD: 4.64 M/UL — SIGNIFICANT CHANGE UP (ref 3.8–5.2)
RBC # FLD: 13.2 % — SIGNIFICANT CHANGE UP (ref 10.3–14.5)
RBC BLD AUTO: SIGNIFICANT CHANGE UP
SODIUM SERPL-SCNC: 139 MMOL/L
TACROLIMUS SERPL-MCNC: <2 NG/ML
WBC # BLD: 14.6 K/UL — HIGH (ref 3.8–10.5)
WBC # FLD AUTO: 14.6 K/UL — HIGH (ref 3.8–10.5)

## 2020-01-02 PROCEDURE — 88264 CHROMOSOME ANALYSIS 20-25: CPT

## 2020-01-02 PROCEDURE — 36415 COLL VENOUS BLD VENIPUNCTURE: CPT

## 2020-01-02 PROCEDURE — 88271 CYTOGENETICS DNA PROBE: CPT

## 2020-01-02 PROCEDURE — 99215 OFFICE O/P EST HI 40 MIN: CPT

## 2020-01-02 PROCEDURE — 81207 BCR/ABL1 GENE MINOR BP: CPT

## 2020-01-02 PROCEDURE — 85097 BONE MARROW INTERPRETATION: CPT

## 2020-01-02 PROCEDURE — 88341 IMHCHEM/IMCYTCHM EA ADD ANTB: CPT

## 2020-01-02 PROCEDURE — 88184 FLOWCYTOMETRY/ TC 1 MARKER: CPT

## 2020-01-02 PROCEDURE — 88291 CYTO/MOLECULAR REPORT: CPT

## 2020-01-02 PROCEDURE — 81206 BCR/ABL1 GENE MAJOR BP: CPT

## 2020-01-02 PROCEDURE — 88313 SPECIAL STAINS GROUP 2: CPT

## 2020-01-02 PROCEDURE — 88280 CHROMOSOME KARYOTYPE STUDY: CPT

## 2020-01-02 PROCEDURE — 88305 TISSUE EXAM BY PATHOLOGIST: CPT

## 2020-01-02 PROCEDURE — 88237 TISSUE CULTURE BONE MARROW: CPT

## 2020-01-02 PROCEDURE — 87205 SMEAR GRAM STAIN: CPT

## 2020-01-02 PROCEDURE — 88342 IMHCHEM/IMCYTCHM 1ST ANTB: CPT

## 2020-01-02 PROCEDURE — 88185 FLOWCYTOMETRY/TC ADD-ON: CPT

## 2020-01-02 PROCEDURE — 88275 CYTOGENETICS 100-300: CPT

## 2020-01-02 RX ORDER — LEVETIRACETAM 500 MG/1
500 TABLET, FILM COATED ORAL TWICE DAILY
Qty: 60 | Refills: 3 | Status: ACTIVE | COMMUNITY
Start: 2018-12-06 | End: 1900-01-01

## 2020-01-06 LAB
CHROM ANALY INTERPHASE BLD FISH-IMP: SIGNIFICANT CHANGE UP
CMV DNA SPEC QL NAA+PROBE: NOT DETECTED
CMVPCR LOG: NOT DETECTED LOG10IU/ML

## 2020-01-07 ENCOUNTER — OTHER (OUTPATIENT)
Age: 60
End: 2020-01-07

## 2020-01-08 NOTE — DISCHARGE NOTE ADULT - [email or web address]_GEN_A_NCS
I concur with the Admission Order and I certify that services are provided in accordance with Section 42 CFR § 412.3 [place] Website --- [email or web address] website --- [email or web address]

## 2020-01-09 ENCOUNTER — OTHER (OUTPATIENT)
Age: 60
End: 2020-01-09

## 2020-01-12 NOTE — REVIEW OF SYSTEMS
[Fatigue] : fatigue [Cough] : cough [Vomiting] : vomiting [Diarrhea] : diarrhea [Negative] : Musculoskeletal [Fever] : no fever [Chills] : no chills [Night Sweats] : no night sweats [SOB on Exertion] : no shortness of breath during exertion [Abdominal Pain] : no abdominal pain [Constipation] : no constipation [Joint Pain] : no joint pain [Joint Stiffness] : no joint stiffness [Muscle Weakness] : no muscle weakness [Dizziness] : no dizziness [Fainting] : no fainting [FreeTextEntry5] : PICC line has been removed [FreeTextEntry6] : Nonproductive cough  [de-identified] : Using a cane to ambulate

## 2020-01-12 NOTE — PHYSICAL EXAM
[Ambulatory and capable of all self care but unable to carry out any work activities] : Status 2- Ambulatory and capable of all self care but unable to carry out any work activities. Up and about more than 50% of waking hours [Normal] : affect appropriate [de-identified] : picc line has been removed [de-identified] : stable gait

## 2020-01-12 NOTE — HISTORY OF PRESENT ILLNESS
[de-identified] : Ms. Galeas is a 59 year old female who was dx with ph+ ALL in april 2016...she initially presented to [facility] and was transferred to [facility] where she received R HyperCVAD. Her induction course was complicated by tutu fever. She received cycle 2 mid may. BM BX prior was morphologically remission but pcr was positive. Cycle 4 was completed. Prolonged course with fever, pericardial effusion, possible fungal pna. I suspect fever and effusion due to sprycel. She also had neurologic / mental status changes after MTX. She has an omaya. She had two MTX IT. She feels well today.  s/p step 1 for stem cell mobilization..in CR1..S/P auto stem cell transplant with tam 200 mg/m2 prep...\par \par 10/4/18 - 11/20/18 : 58F hx HTN, Obesity, Ph(+) ALL s/p R Hypercavd x4 cycles IT MTX x2 s/p stem cell collection w/ Step 1(HD vp16 plus arac) stem cell mobilization regime. \par FISH and PCR negative. s/p Tam-Auto on 12/16/16. Patient prior to presentation had severe burning right abdominal pain with vesicular lesions and was diagnosed with shingles and received a 10 day course of antiviral medications. \par Patient on presentation had residual neuropathic pain.  Patient presented to the ER with severe occipital parietal headaches with nausea and vomiting. \par Patient also admitted to easy bruising mouth sores and dark stools. \par CT Head was done for complaints of headache which was (+) for SDH. Neurosurgery was consulted on initial detection of SDH, recommendations to keep platelets >80,000 was made.  LGIB was attributed to profound thrombocytopenia. The patient was transferred to [address] [place] [address] and upon confirmation of peripheral blood flow the patient was noted to have relapsed B-ALL Ph (+). A PICC line was placed for chemotherapy and was initiated with Inotuzamab on Day 1, 8 and 15. IVF and Allopurinol for TLS. The patient has had refractory thrombocytopenia. H L A platelets were infused when they were available. when H L A platelets were not available 1/2 unit of platelet was infused over 3 hours. \par Follow up CT Head on 10/11 was stable and the patient continued to receive 1/2 units of platelets over 3 hours every 12 hours. The patient received last dose of Inotuzamab Cycle 1 on 10/22. On 10/15 patient was febrile,  a CT of the chest/abd/pelvis was done which showed scattered patchy ground glass opacities in right upper and lower lobes, suggestive of infection, patient received a course of IV antibiotics for Pneumonia. On 11/2 Blood cultures were found to be (+) for Co-agulase (-) Staph and patient received a course of Vancomycin. \par Repeat CT Head was completed on 10/23 for follow up and showed some new bleeding into previous collection. Findings were discussed with Neuro Surgery. \par HLA platelets were administered when available for refractory thrombocytopenia. \par Cycle 2 Inotuzumab was started on 11/6 which was given on Days 1, 8, 15. \par Patient tolerated second cycle without any adverse reactions. Patient for possible future Haploidentical Allogeneic stem cell transplant after discharge. \par Pre-BMT testing completed prior to discharge: Echo, MUGA, Xray sinuses, 24-Hr Urine for Creatinine. \par Patient had intermittent severe headaches on 11/3 and 11/15, repeat CTs showed nearly resolved hemorrhage. Headaches were managed with analgesics Fentanyl patch 37 mcg/hr and Oxycodone IR 15 mg q3 prn prior to discharge home. She is on ponatinib QOD b/c of increased LFT's.  \par \par Patient underwent a haploidentical PBSCT on 2/13/19 (son), hospital course as follows:\par Ms. 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 Her transplant course was complicated by shingles. In October, 2018 she relapsed (confirmed via flow cytometry). She was treated with inotuzumab x 2 cycles. Her course was complicated by subdural hemorrhage, refractory thrombocytopenia requiring HLA matched platelets, pneumonia, and coag negative staph bacteremia (treated with vancomycin). She is now admitted for a haplo-identical peripheral blood stem cell transplant from her son. \par \par Upon admission, a TLC was placed in IR. Ms. Galeas received IV hydration, pain management, antiemetics, nutritional support, antiviral / antibacterial / antifungal / PCP / GI / VOD (SOS) prophylaxis. Labs were monitored on a daily basis, and she received electrolyte repletion and transfusional support as needed. \par \par Ms. Galeas had a relatively uncomplicated transplant course. A baseline CT of the head was done on admission given her history of SDH and refractory thrombocytopenia. The baseline CT was negative. Ms. Galeas did experience pancytopenia related to the high dose chemotherapy preparative regimen. Her thrombocytopenia was refractory, and was managed by infusing 1/2 unit of platelets twice daily over 3 hours. Ms. Galeas also experienced neutropenic fevers. When she became neutropenic, she was started on prophylactic ciprofloxacin. When she developed fevers, blood and urine cultures were sent, a CXR completed and the ciprofloxacin was changed to cefepime. Her cultures and CXR remained negative. \par \par On 2/13/19, Ms. Galeas received 280ml of fresh, mobilized, haplo-identical, HPC apheresis over approximately 1 hour. Cell counts as follows: \par Total MNCs (x 10^8/kg) = 4.36 \par CD34+ cells (x 10^6/kg) = 7.78 \par Cell Viability (%) = 100% \par \par Engraftment was noted on 2/27/19. Post engraftment, the cefepime and zarxio were discontinued. Tacrolimus levels and CMV PCR were monitored twice weekly. A FISH for Y was sent to determine chimerism, with results pending. 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 Patient states she is doing well overall. She reports a recent illness with chills and fever, which has since resolved. She c/o continued bone pain. Currently on tacrolimus 1mg BID and valcyte 450 mg BID. Prednisone was discontinued on 6/26/19. Right PICC line has been removed. Denies fever, chills, nausea, vomiting, diarrhea, rash, mouth sores, dysuria or any signs of active bleeding. Denies SOB, chest pain or B/L LE edema. Remains compliant with post transplant diet and crowd restrictions.\par \par On 8/29/19, patient presents for follow up visit status post Allo PBSCT on 2/13/19. Today is +197 days post transplant. Continues to complain of bilateral upper extremity pain and bone pain. Patient has been taking oxycodone prn. Denies fever, chills, nausea, vomiting, diarrhea, rash, mouth sores or any signs of active bleeding. Denies SOB, chest pain or B/L LE edema. Currently on FK 1 mg BID and did not take prior to blood work today. Remains compliant with post transplant diet and crowd restrictions. Remains compliant with post transplant medications. \par \par On 9/12/19, patient presents for follow up visit status post Allo PBSCT on 2/13/19. Today is +211 days post transplant .Continues to complain of bilateral upper extremity pain and bone pain. Denies fever, chills, nausea, vomiting, diarrhea, rash, mouth sores or any signs of active bleeding. Denies SOB, chest pain or B/L LE edema. Currently on FK 1 mg AM and 0.5 mg in the PM and did not take prior to blood work today. Remains compliant with post transplant diet and crowd restrictions. Remains compliant with post transplant medications. \par \par On 10/10/19, patient presents for follow up visit status post Allo PBSCT on 2/13/19. Today is +239 days post transplant. Continues to complain of bilateral upper extremity pain, back pain. Pain is described as bone pain. On lyrica with no improvement. . Denies fever, chills, nausea, vomiting, diarrhea, rash, mouth sores or any signs of active bleeding. Denies SOB, chest pain or B/L LE edema. On a tacrolimus taper, currently taking 0.5 mg BID. Remains compliant with post transplant diet and crowd restrictions. Remains compliant with post transplant medications. \par \par On 11/4/19, patient presents for follow up visit status post Allo PBSCT on 2/13/19. Today is +264 days post transplant. Continues to complain of bilateral upper extremity pain and back pain. Pain is described as bone pain. On lyrica with no improvement. Patient states she is going to schedule a follow up appointment with pain management. Currently on FK taper. At this time is taking FK 0.5 mg BID Monday- Friday. Denies taking FK prior to blood work today. Denies fever, chills, nausea, vomiting, diarrhea, rash, mouth sores, dysuria or any signs of active bleeding. Denies SOB, chest pain or B/L LE edema. Using a cane today to walk. Remains compliant with post transplant diet and crowd restrictions. Remains compliant with post transplant medications. \par \par On 11/21/19,  patient presents for follow up visit status post Allo PBSCT on 2/13/19. Today is +281 days post transplant. Overall doing well and offers no acute concerns. Currently on tacrolimus taper and is taking FK 0.5 mg BID Mon-Thur. Denies fever, chills, nausea, vomiting, diarrhea, rash, mouth sores, dysuria or any signs of active bleeding. Denies SOB, chest pain or B/L LE edema. Using a cane today to walk. Remains compliant with post transplant diet and crowd restrictions. Remains compliant with post transplant medications. \par \par On 1/2/20, patient presents for a follow up visit status post Allo PBSCT on 2/13/19. Today is +323 days post transplant. Patient states over the holidays her family members had a GI virus. Kellen had an episode of emesis on Monday and Tuesday this week which has resolved. Continues to have diarrhea and a nonproductive cough. On FK taper and currently is taking FK 0. 5  mg BID on Monday and  Tuesdays. Denies fever, chills, rash, mouth sores, dysuria or any signs of active bleeding. Denies SOB, chest pain or B/L LE edema.

## 2020-01-12 NOTE — ASSESSMENT
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 Relapsed ph pos ALL...s/p re induction with inotuzimab..Achieved CR. 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 \par \par 1) Ph+ ALL\par S/P Haploidentical PBSCT on 2/13/19 (son)\par 11/21/19 Fish for Y 100% donor\par Post transplant BMbx completed on 12/13/19. Results reviewed with patient.\par \par 2) Heme\par Counts stable, no indication for transfusion\par  WBC 14.6 ANC 3.8 Hgb 15.4 \par Continue folic acid and multivitamin\par \par 3) ID\par Continue ppx:\par - Mepron 750 mg BID\par - Fluconazole 200 mg oral tablet -- decreased to 1 tab(s) by mouth once a day on 4/24/19\par Post transplant restrictions reviewed \par \par CMV viremia\par Acyclovir switched to Valcyte 450 mg bid on 3/29/19\par Patient had mistakenly been taking acyclovir until 5/23/19, CMV PCR improved with starting Valcyte\par 11/21/19 CMV negative to date. Continue to monitor CMV PCR weekly\par Continue Valcyte 450 mg bid, decreased as of 6/12/19\par CMV PCR negative on 11/21/19\par \par 4) GVHD\par Skin 0 Liver 0 GI 0 - overall grade 0\par No signs of acute/chronic GVHD\par FK taper started on 9/12/19. \par Currently taking FK 0.5 mg BID Mon-Tuesday. 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 Patient will make a follow up appointment with Pain Management.\par Hypomagnesia - magnesium oxide 400 mg tid\par Headaches - continue Keppra 500 mg bid- patient admits not taking keppra. Prescription sent to [facility] today.\par Insomnia - continue PRN Ambien 5 mg q hs\par \par 8) Plan/Dispo\par Pt educated regarding plan of care, all questions/concerns addressed\par Instructed to contact our office with any new/worsening symptoms\par F/u on 1/22/20 with MD Bentley. \par \par

## 2020-01-30 ENCOUNTER — OUTPATIENT (OUTPATIENT)
Dept: OUTPATIENT SERVICES | Facility: HOSPITAL | Age: 60
LOS: 1 days | Discharge: HOME | End: 2020-01-30

## 2020-01-30 DIAGNOSIS — D17.9 BENIGN LIPOMATOUS NEOPLASM, UNSPECIFIED: Chronic | ICD-10-CM

## 2020-01-30 DIAGNOSIS — Z98.89 OTHER SPECIFIED POSTPROCEDURAL STATES: Chronic | ICD-10-CM

## 2020-01-30 DIAGNOSIS — Z94.84 STEM CELLS TRANSPLANT STATUS: ICD-10-CM

## 2020-01-30 DIAGNOSIS — C91.00 ACUTE LYMPHOBLASTIC LEUKEMIA NOT HAVING ACHIEVED REMISSION: ICD-10-CM

## 2020-01-30 DIAGNOSIS — Z41.9 ENCOUNTER FOR PROCEDURE FOR PURPOSES OTHER THAN REMEDYING HEALTH STATE, UNSPECIFIED: Chronic | ICD-10-CM

## 2020-02-03 NOTE — DIETITIAN INITIAL EVALUATION ADULT. - NUTRITION INTERVENTION
Meals and Snack/Nutrition Education Nutrition Education Post-Care Instructions: I reviewed with the patient in detail post-care instructions. Patient is to wear sunprotection, and avoid picking at any of the treated lesions. Pt may apply Vaseline to crusted or scabbing areas. Duration Of Freeze Thaw-Cycle (Seconds): 0 Consent: The patient's consent was obtained including but not limited to risks of crusting, scabbing, blistering, scarring, darker or lighter pigmentary change, recurrence, incomplete removal and infection. Render Note In Bullet Format When Appropriate: No Detail Level: Detailed

## 2020-02-04 ENCOUNTER — RESULT REVIEW (OUTPATIENT)
Age: 60
End: 2020-02-04

## 2020-02-04 ENCOUNTER — APPOINTMENT (OUTPATIENT)
Dept: HEMATOLOGY ONCOLOGY | Facility: CLINIC | Age: 60
End: 2020-02-04
Payer: MEDICARE

## 2020-02-04 ENCOUNTER — OUTPATIENT (OUTPATIENT)
Dept: OUTPATIENT SERVICES | Facility: HOSPITAL | Age: 60
LOS: 1 days | End: 2020-02-04

## 2020-02-04 VITALS
HEART RATE: 99 BPM | BODY MASS INDEX: 39.03 KG/M2 | DIASTOLIC BLOOD PRESSURE: 83 MMHG | RESPIRATION RATE: 16 BRPM | SYSTOLIC BLOOD PRESSURE: 141 MMHG | OXYGEN SATURATION: 98 % | TEMPERATURE: 98.6 F | WEIGHT: 218.92 LBS

## 2020-02-04 DIAGNOSIS — Z41.9 ENCOUNTER FOR PROCEDURE FOR PURPOSES OTHER THAN REMEDYING HEALTH STATE, UNSPECIFIED: Chronic | ICD-10-CM

## 2020-02-04 DIAGNOSIS — D17.9 BENIGN LIPOMATOUS NEOPLASM, UNSPECIFIED: Chronic | ICD-10-CM

## 2020-02-04 DIAGNOSIS — I95.9 HYPOTENSION, UNSPECIFIED: ICD-10-CM

## 2020-02-04 DIAGNOSIS — Z87.19 PERSONAL HISTORY OF OTHER DISEASES OF THE DIGESTIVE SYSTEM: ICD-10-CM

## 2020-02-04 DIAGNOSIS — Z94.84 STEM CELLS TRANSPLANT STATUS: ICD-10-CM

## 2020-02-04 DIAGNOSIS — Z98.89 OTHER SPECIFIED POSTPROCEDURAL STATES: Chronic | ICD-10-CM

## 2020-02-04 DIAGNOSIS — Z51.11 ENCOUNTER FOR ANTINEOPLASTIC CHEMOTHERAPY: ICD-10-CM

## 2020-02-04 LAB
ALBUMIN SERPL ELPH-MCNC: 4 G/DL
ALP BLD-CCNC: 190 U/L
ALT SERPL-CCNC: 37 U/L
ANION GAP SERPL CALC-SCNC: 11 MMOL/L
AST SERPL-CCNC: 35 U/L
BASOPHILS # BLD AUTO: 0.1 K/UL — SIGNIFICANT CHANGE UP (ref 0–0.2)
BASOPHILS NFR BLD AUTO: 0.7 % — SIGNIFICANT CHANGE UP (ref 0–2)
BILIRUB SERPL-MCNC: 0.4 MG/DL
BUN SERPL-MCNC: 18 MG/DL
CALCIUM SERPL-MCNC: 10.3 MG/DL
CHLORIDE SERPL-SCNC: 109 MMOL/L
CO2 SERPL-SCNC: 24 MMOL/L
CREAT SERPL-MCNC: 0.97 MG/DL
EOSINOPHIL # BLD AUTO: 0.1 K/UL — SIGNIFICANT CHANGE UP (ref 0–0.5)
EOSINOPHIL NFR BLD AUTO: 1.5 % — SIGNIFICANT CHANGE UP (ref 0–6)
GLUCOSE SERPL-MCNC: 93 MG/DL
HCT VFR BLD CALC: 42.5 % — SIGNIFICANT CHANGE UP (ref 34.5–45)
HGB BLD-MCNC: 14.1 G/DL — SIGNIFICANT CHANGE UP (ref 11.5–15.5)
LDH SERPL-CCNC: 211 U/L
LYMPHOCYTES # BLD AUTO: 4.9 K/UL — HIGH (ref 1–3.3)
LYMPHOCYTES # BLD AUTO: 53.9 % — HIGH (ref 13–44)
MAGNESIUM SERPL-MCNC: 2.3 MG/DL
MCHC RBC-ENTMCNC: 32.9 PG — SIGNIFICANT CHANGE UP (ref 27–34)
MCHC RBC-ENTMCNC: 33.1 G/DL — SIGNIFICANT CHANGE UP (ref 32–36)
MCV RBC AUTO: 99.3 FL — SIGNIFICANT CHANGE UP (ref 80–100)
MONOCYTES # BLD AUTO: 0.3 K/UL — SIGNIFICANT CHANGE UP (ref 0–0.9)
MONOCYTES NFR BLD AUTO: 3.4 % — SIGNIFICANT CHANGE UP (ref 2–14)
NEUTROPHILS # BLD AUTO: 3.7 K/UL — SIGNIFICANT CHANGE UP (ref 1.8–7.4)
NEUTROPHILS NFR BLD AUTO: 40.5 % — LOW (ref 43–77)
PLATELET # BLD AUTO: 173 K/UL — SIGNIFICANT CHANGE UP (ref 150–400)
POTASSIUM SERPL-SCNC: 4.6 MMOL/L
PROT SERPL-MCNC: 7.1 G/DL
RBC # BLD: 4.28 M/UL — SIGNIFICANT CHANGE UP (ref 3.8–5.2)
RBC # FLD: 13.2 % — SIGNIFICANT CHANGE UP (ref 10.3–14.5)
SODIUM SERPL-SCNC: 144 MMOL/L
WBC # BLD: 9.2 K/UL — SIGNIFICANT CHANGE UP (ref 3.8–10.5)
WBC # FLD AUTO: 9.2 K/UL — SIGNIFICANT CHANGE UP (ref 3.8–10.5)

## 2020-02-04 PROCEDURE — 99215 OFFICE O/P EST HI 40 MIN: CPT

## 2020-02-04 RX ORDER — AZITHROMYCIN 250 MG/1
250 TABLET, FILM COATED ORAL
Qty: 6 | Refills: 0 | Status: ACTIVE | COMMUNITY
Start: 2020-02-04 | End: 1900-01-01

## 2020-02-04 NOTE — HISTORY OF PRESENT ILLNESS
[de-identified] : Ms. 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 Her induction course was complicated by tutu fever. She received cycle 2 mid may. BM BX prior was morphologically remission but pcr was positive. Cycle 4 was completed. Prolonged course with fever, pericardial effusion, possible fungal pna. I suspect fever and effusion due to sprycel. She also had neurologic / mental status changes after MTX. She has an omaya. She had two MTX IT. She feels well today.  s/p step 1 for stem cell mobilization..in CR1..S/P auto stem cell transplant with tam 200 mg/m2 prep...\par \par 10/4/18 - 11/20/18 : 58F hx HTN, Obesity, Ph(+) ALL s/p R Hypercavd x4 cycles IT MTX x2 s/p stem cell collection w/ Step 1(HD vp16 plus arac) stem cell mobilization regime. \par FISH and PCR negative. s/p Tam-Auto on 12/16/16. Patient prior to presentation had severe burning right abdominal pain with vesicular lesions and was diagnosed with shingles and received a 10 day course of antiviral medications. \par Patient on presentation had residual neuropathic pain.  Patient presented to the ER with severe occipital parietal headaches with nausea and vomiting. \par Patient also admitted to easy bruising mouth sores and dark stools. \par CT Head was done for complaints of headache which was (+) for SDH. Neurosurgery was consulted on initial detection of SDH, recommendations to keep platelets >80,000 was made.  LGIB was attributed to profound thrombocytopenia. The patient was transferred to [address] [place] [address] and upon confirmation of peripheral blood flow the patient was noted to have relapsed B-ALL Ph (+). A PICC line was placed for chemotherapy and was initiated with Inotuzamab on Day 1, 8 and 15. IVF and Allopurinol for TLS. The patient has had refractory thrombocytopenia. H L A platelets were infused when they were available. when H L A platelets were not available 1/2 unit of platelet was infused over 3 hours. \par Follow up CT Head on 10/11 was stable and the patient continued to receive 1/2 units of platelets over 3 hours every 12 hours. The patient received last dose of Inotuzamab Cycle 1 on 10/22. On 10/15 patient was febrile,  a CT of the chest/abd/pelvis was done which showed scattered patchy ground glass opacities in right upper and lower lobes, suggestive of infection, patient received a course of IV antibiotics for Pneumonia. On 11/2 Blood cultures were found to be (+) for Co-agulase (-) Staph and patient received a course of Vancomycin. \par Repeat CT Head was completed on 10/23 for follow up and showed some new bleeding into previous collection. Findings were discussed with Neuro Surgery. \par HLA platelets were administered when available for refractory thrombocytopenia. \par Cycle 2 Inotuzumab was started on 11/6 which was given on Days 1, 8, 15. \par Patient tolerated second cycle without any adverse reactions. Patient for possible future Haploidentical Allogeneic stem cell transplant after discharge. \par Pre-BMT testing completed prior to discharge: Echo, MUGA, Xray sinuses, 24-Hr Urine for Creatinine. \par Patient had intermittent severe headaches on 11/3 and 11/15, repeat CTs showed nearly resolved hemorrhage. Headaches were managed with analgesics Fentanyl patch 37 mcg/hr and Oxycodone IR 15 mg q3 prn prior to discharge home. She is on ponatinib QOD b/c of increased LFT's.  \par \par Patient underwent a haploidentical PBSCT on 2/13/19 (son), hospital course as follows:\par Ms. 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 Her transplant course was complicated by shingles. In October, 2018 she relapsed (confirmed via flow cytometry). She was treated with inotuzumab x 2 cycles. Her course was complicated by subdural hemorrhage, refractory thrombocytopenia requiring HLA matched platelets, pneumonia, and coag negative staph bacteremia (treated with vancomycin). She is now admitted for a haplo-identical peripheral blood stem cell transplant from her son. \par \par Upon admission, a TLC was placed in IR. Ms. Galeas received IV hydration, pain management, antiemetics, nutritional support, antiviral / antibacterial / antifungal / PCP / GI / VOD (SOS) prophylaxis. Labs were monitored on a daily basis, and she received electrolyte repletion and transfusional support as needed. \par \par Ms. Galeas had a relatively uncomplicated transplant course. A baseline CT of the head was done on admission given her history of SDH and refractory thrombocytopenia. The baseline CT was negative. Ms. Galeas did experience pancytopenia related to the high dose chemotherapy preparative regimen. Her thrombocytopenia was refractory, and was managed by infusing 1/2 unit of platelets twice daily over 3 hours. Ms. Galeas also experienced neutropenic fevers. When she became neutropenic, she was started on prophylactic ciprofloxacin. When she developed fevers, blood and urine cultures were sent, a CXR completed and the ciprofloxacin was changed to cefepime. Her cultures and CXR remained negative. \par \par On 2/13/19, Ms. Galeas received 280ml of fresh, mobilized, haplo-identical, HPC apheresis over approximately 1 hour. Cell counts as follows: \par Total MNCs (x 10^8/kg) = 4.36 \par CD34+ cells (x 10^6/kg) = 7.78 \par Cell Viability (%) = 100% \par \par Engraftment was noted on 2/27/19. Post engraftment, the cefepime and zarxio were discontinued. Tacrolimus levels and CMV PCR were monitored twice weekly. A FISH for Y was sent to determine chimerism, with results pending. 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 Patient is unaccompanied. Patient states she currently has an illness and has been coughing without producing phlegm. She reports taking Mucinex. She states she had a previous stomach virus and a different illness which both resolved prior to her current illness. She notes she is following up with a pain management specialist for shooting knee pain. Patient notes she is taking Oxycodone 10mg for pain. Prednisone was discontinued on 6/26/19. Right PICC line has been removed. Denies fever/chills, skin rash, night sweats, mouth sores, eye dryness, blurred vision, nausea, vomiting, diarrhea. No CP, SOB or LE edema. Remains compliant with post transplant diet and crowd restrictions. Off Tacrolimus as of 1/20/2020.

## 2020-02-04 NOTE — ASSESSMENT
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 Relapsed ph pos ALL...s/p re induction with inotuzimab..Achieved CR. 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 \par \par 1) Ph+ ALL\par S/P Haploidentical PBSCT on 2/13/19 (son)\par 1/2/2020 Fish for Y 100% donor\par Post transplant BMbx completed on 12/13/19. Results reviewed with patient.\par \par 2) Heme\par Counts stable, no indication for transfusion\par  WBC 9.2 HGB 14.1 ANC 3.7 \par Continue folic acid and multivitamin\par \par 3) ID\par Continue ppx:\par - Mepron 750 mg BID\par - Fluconazole 200 mg oral tablet -- decreased to 1 tab(s) by mouth once a day on 4/24/19 - discontinued as of 2/4/2020.\par Post transplant restrictions reviewed \par \par CMV viremia\par Acyclovir switched to Valcyte 450 mg bid on 3/29/19\par Patient had mistakenly been taking acyclovir until 5/23/19, CMV PCR improved with starting Valcyte\par 1/02/2020 CMV negative to date. 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 Patient will make a follow up appointment with Pain Management.\par Hypomagnesia - magnesium oxide 400 mg tid\par Headaches - continue Keppra 500 mg bid- patient admits not taking keppra. Prescription sent to [facility] today.\par Insomnia - continue PRN Ambien 5 mg q hs\par \par 8) Plan/Dispo\par Pt educated regarding plan of care, all questions/concerns addressed\par Labs sent for CMP, LDH, Mg, CMV by PCR, FISH, Tacrolimus Whole Blood\par Discontinued FK as of 1/20/2020. Discontinue Fluconazole as of 2/4/2020.\par Discussed discontinuing anti infectives post 6 months off of Tacro.\par Discussed revaccination post 9 months off of Tacro.\par Reviewed [facility] Hematopathology Report (12/11/19) and discussed with patient.\par Recommend use of Delsym for cough. Ordered Z-rm on 2/4/2020 for patient to take if her symptoms do not improve within the next few days.\par Patient may receive cortisol injections as per pain management specialist.\par Instructed to contact our office with any new/worsening symptoms\par RTC for f/u with Dr. Bentley in 4 weeks.

## 2020-02-04 NOTE — PHYSICAL EXAM
[Ambulatory and capable of all self care but unable to carry out any work activities] : Status 2- Ambulatory and capable of all self care but unable to carry out any work activities. Up and about more than 50% of waking hours [Normal] : affect appropriate [de-identified] : picc line has been removed [de-identified] : stable gait

## 2020-02-04 NOTE — ADDENDUM
[FreeTextEntry1] : Documented by Toma Umaña acting as a scribe for Dr. Luke Bentley on 02/04/2020.\par \par All medical record entries made by the Scribe were at my, Dr. Luke Bentley, direction and personally dictated by me on 02/04/2020. I have reviewed the chart and agree that  the record accurately reflects my personal performance of the history, physical exam, assessment and plan. I have also personally directed, reviewed, and agree with the discharge instructions.

## 2020-02-04 NOTE — REVIEW OF SYSTEMS
[Fatigue] : fatigue [Negative] : Gastrointestinal [Cough] : cough [Muscle Weakness] : no muscle weakness [FreeTextEntry2] : illness [FreeTextEntry5] : PICC line has been removed [FreeTextEntry9] : knee pain

## 2020-02-05 ENCOUNTER — LABORATORY RESULT (OUTPATIENT)
Age: 60
End: 2020-02-05

## 2020-02-05 LAB
CMV DNA SPEC QL NAA+PROBE: NOT DETECTED
CMVPCR LOG: NOT DETECTED LOG10IU/ML
TACROLIMUS SERPL-MCNC: <2 NG/ML

## 2020-02-13 ENCOUNTER — APPOINTMENT (OUTPATIENT)
Dept: HEMATOLOGY ONCOLOGY | Facility: CLINIC | Age: 60
End: 2020-02-13

## 2020-02-17 NOTE — CHART NOTE - NSCHARTNOTESELECT_GEN_ALL_CORE
[employer] [facility] [place] Pregnancy And Lactation Text: This medication is not safe during pregnancy. It is unknown if this medication is excreted in breast milk.

## 2020-02-18 ENCOUNTER — APPOINTMENT (OUTPATIENT)
Dept: RADIOLOGY | Facility: IMAGING CENTER | Age: 60
End: 2020-02-18
Payer: MEDICARE

## 2020-02-18 ENCOUNTER — OUTPATIENT (OUTPATIENT)
Dept: OUTPATIENT SERVICES | Facility: HOSPITAL | Age: 60
LOS: 1 days | End: 2020-02-18
Payer: COMMERCIAL

## 2020-02-18 DIAGNOSIS — Z00.8 ENCOUNTER FOR OTHER GENERAL EXAMINATION: ICD-10-CM

## 2020-02-18 DIAGNOSIS — Z98.89 OTHER SPECIFIED POSTPROCEDURAL STATES: Chronic | ICD-10-CM

## 2020-02-18 DIAGNOSIS — D17.9 BENIGN LIPOMATOUS NEOPLASM, UNSPECIFIED: Chronic | ICD-10-CM

## 2020-02-18 DIAGNOSIS — Z41.9 ENCOUNTER FOR PROCEDURE FOR PURPOSES OTHER THAN REMEDYING HEALTH STATE, UNSPECIFIED: Chronic | ICD-10-CM

## 2020-02-18 PROCEDURE — 73562 X-RAY EXAM OF KNEE 3: CPT

## 2020-02-18 PROCEDURE — 72110 X-RAY EXAM L-2 SPINE 4/>VWS: CPT

## 2020-02-18 PROCEDURE — 73562 X-RAY EXAM OF KNEE 3: CPT | Mod: 26,RT

## 2020-02-18 PROCEDURE — 72110 X-RAY EXAM L-2 SPINE 4/>VWS: CPT | Mod: 26

## 2020-02-24 ENCOUNTER — RX RENEWAL (OUTPATIENT)
Age: 60
End: 2020-02-24

## 2020-02-25 NOTE — DISCHARGE NOTE ADULT - THE PATIENT HAS
Department of [place] [id number]  [facility]     COPD (chronic obstructive pulmonary disease) ([facility]     Deviated nasal septum     Difficult intubation     use small tube    GERD (gastroesophageal reflux disease)     GERD    History of blood transfusion 1980    Hypertrophic cardiomyopathy ([facility] 05/2018    Pain     rt lower quadrant back and legs    Psoriasis     Rosacea     Severe protein-calorie malnutrition ([facility] 7/6/2018    Sjogren's disease ([facility]     no difficulties

## 2020-02-27 ENCOUNTER — OUTPATIENT (OUTPATIENT)
Dept: OUTPATIENT SERVICES | Facility: HOSPITAL | Age: 60
LOS: 1 days | Discharge: HOME | End: 2020-02-27

## 2020-02-27 DIAGNOSIS — D17.9 BENIGN LIPOMATOUS NEOPLASM, UNSPECIFIED: Chronic | ICD-10-CM

## 2020-02-27 DIAGNOSIS — Z98.89 OTHER SPECIFIED POSTPROCEDURAL STATES: Chronic | ICD-10-CM

## 2020-02-27 DIAGNOSIS — Z41.9 ENCOUNTER FOR PROCEDURE FOR PURPOSES OTHER THAN REMEDYING HEALTH STATE, UNSPECIFIED: Chronic | ICD-10-CM

## 2020-02-27 DIAGNOSIS — C91.00 ACUTE LYMPHOBLASTIC LEUKEMIA NOT HAVING ACHIEVED REMISSION: ICD-10-CM

## 2020-03-05 ENCOUNTER — APPOINTMENT (OUTPATIENT)
Dept: HEMATOLOGY ONCOLOGY | Facility: CLINIC | Age: 60
End: 2020-03-05

## 2020-03-05 NOTE — H&P ADULT - VASCULAR
Date Scheduled: 4-8-20  Facility: [facility]  Surgeon: Dr. Schmid   Post-op appointment scheduled:   Next 5 appointments (look out 90 days)    Mar 13, 2020 10:00 AM CDT  Office Visit with Rebekah Rausch MD  [facility] [address]  [facility] [address]  [phone]   Apr 22, 2020 10:30 AM CDT  Nurse Only with MG NURSE ONLY ORTHO  [facility]) [id number] [place] [address] [phone]   May 26, 2020 10:30 AM CDT  Return Visit with Edward Schmid MD  [facility]) [id number] [place] [address] [phone]           scheduled?: No  Surgery packet/instructions confirmed received?  Yes  Special Considerations:   Brook Schrader, Surgery Scheduling Coordinator       detailed exam

## 2020-03-16 RX ORDER — AMLODIPINE BESYLATE 5 MG/1
5 TABLET ORAL DAILY
Qty: 30 | Refills: 3 | Status: ACTIVE | COMMUNITY
Start: 2019-03-04 | End: 1900-01-01

## 2020-03-16 RX ORDER — PANTOPRAZOLE 40 MG/1
40 TABLET, DELAYED RELEASE ORAL
Qty: 30 | Refills: 3 | Status: ACTIVE | COMMUNITY
Start: 2018-12-06 | End: 1900-01-01

## 2020-03-17 ENCOUNTER — APPOINTMENT (OUTPATIENT)
Dept: HEMATOLOGY ONCOLOGY | Facility: CLINIC | Age: 60
End: 2020-03-17

## 2020-03-18 NOTE — H&P ADULT - PROBLEM SELECTOR PLAN 5
Consent: Written consent was obtained and risks were reviewed including but not limited to scarring, infection, bleeding, scabbing, pain, bruising, incomplete removal, nerve damage and allergy to anesthesia. Continue Actigall for VOD prevention  Continue Keppra for h/o seizures  Conitnue Fluconazole for antifungal ppx  Continue Continue Actigall for VOD prevention  Continue Keppra for h/o seizures  Conitnue Fluconazole for antifungal ppx  Continue Mepron fro PCP ppx  Continue Continue Actigall for VOD prevention  Continue Keppra for h/o seizures  Conitnue Fluconazole for antifungal ppx  Continue Mepron for PCP ppx

## 2020-03-26 ENCOUNTER — APPOINTMENT (OUTPATIENT)
Dept: HEMATOLOGY ONCOLOGY | Facility: CLINIC | Age: 60
End: 2020-03-26

## 2020-04-03 ENCOUNTER — OUTPATIENT (OUTPATIENT)
Dept: OUTPATIENT SERVICES | Facility: HOSPITAL | Age: 60
LOS: 1 days | Discharge: HOME | End: 2020-04-03

## 2020-04-03 DIAGNOSIS — Z98.89 OTHER SPECIFIED POSTPROCEDURAL STATES: Chronic | ICD-10-CM

## 2020-04-03 DIAGNOSIS — Z41.9 ENCOUNTER FOR PROCEDURE FOR PURPOSES OTHER THAN REMEDYING HEALTH STATE, UNSPECIFIED: Chronic | ICD-10-CM

## 2020-04-03 DIAGNOSIS — D17.9 BENIGN LIPOMATOUS NEOPLASM, UNSPECIFIED: Chronic | ICD-10-CM

## 2020-04-03 DIAGNOSIS — C91.00 ACUTE LYMPHOBLASTIC LEUKEMIA NOT HAVING ACHIEVED REMISSION: ICD-10-CM

## 2020-04-07 ENCOUNTER — APPOINTMENT (OUTPATIENT)
Dept: HEMATOLOGY ONCOLOGY | Facility: CLINIC | Age: 60
End: 2020-04-07
Payer: MEDICARE

## 2020-04-07 DIAGNOSIS — S06.5X9A TRAUMATIC SUBDURAL HEMORRHAGE WITH LOSS OF CONSCIOUSNESS OF UNSPECIFIED DURATION, INITIAL ENCOUNTER: ICD-10-CM

## 2020-04-07 DIAGNOSIS — D89.813 GRAFT-VERSUS-HOST DISEASE, UNSPECIFIED: ICD-10-CM

## 2020-04-07 DIAGNOSIS — I10 ESSENTIAL (PRIMARY) HYPERTENSION: ICD-10-CM

## 2020-04-07 DIAGNOSIS — M54.9 DORSALGIA, UNSPECIFIED: ICD-10-CM

## 2020-04-07 DIAGNOSIS — C91.00 ACUTE LYMPHOBLASTIC LEUKEMIA NOT HAVING ACHIEVED REMISSION: ICD-10-CM

## 2020-04-07 DIAGNOSIS — Z86.19 PERSONAL HISTORY OF OTHER INFECTIOUS AND PARASITIC DISEASES: ICD-10-CM

## 2020-04-07 DIAGNOSIS — N17.9 ACUTE KIDNEY FAILURE, UNSPECIFIED: ICD-10-CM

## 2020-04-07 DIAGNOSIS — B25.9 CYTOMEGALOVIRAL DISEASE, UNSPECIFIED: ICD-10-CM

## 2020-04-07 DIAGNOSIS — Z87.891 PERSONAL HISTORY OF NICOTINE DEPENDENCE: ICD-10-CM

## 2020-04-07 PROCEDURE — 99442: CPT
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
## 2020-04-07 NOTE — HISTORY OF PRESENT ILLNESS
[de-identified] : Ms. Galeas is a 59 year old female who was dx with ph+ ALL in april 2016...she initially presented to [facility] and was transferred to [facility] where she received R HyperCVAD. Her induction course was complicated by tutu fever. She received cycle 2 mid may. BM BX prior was morphologically remission but pcr was positive. Cycle 4 was completed. Prolonged course with fever, pericardial effusion, possible fungal pna. I suspect fever and effusion due to sprycel. She also had neurologic / mental status changes after MTX. She has an omaya. She had two MTX IT. She feels well today.  s/p step 1 for stem cell mobilization..in CR1..S/P auto stem cell transplant with tam 200 mg/m2 prep...\par \par 10/4/18 - 11/20/18 : 58F hx HTN, Obesity, Ph(+) ALL s/p R Hypercavd x4 cycles IT MTX x2 s/p stem cell collection w/ Step 1(HD vp16 plus arac) stem cell mobilization regime. \par FISH and PCR negative. s/p Tam-Auto on 12/16/16. Patient prior to presentation had severe burning right abdominal pain with vesicular lesions and was diagnosed with shingles and received a 10 day course of antiviral medications. \par Patient on presentation had residual neuropathic pain.  Patient presented to the ER with severe occipital parietal headaches with nausea and vomiting. \par Patient also admitted to easy bruising mouth sores and dark stools. \par CT Head was done for complaints of headache which was (+) for SDH. Neurosurgery was consulted on initial detection of SDH, recommendations to keep platelets >80,000 was made.  LGIB was attributed to profound thrombocytopenia. The patient was transferred to [address] [place] [address] and upon confirmation of peripheral blood flow the patient was noted to have relapsed B-ALL Ph (+). A PICC line was placed for chemotherapy and was initiated with Inotuzamab on Day 1, 8 and 15. IVF and Allopurinol for TLS. The patient has had refractory thrombocytopenia. H L A platelets were infused when they were available. when H L A platelets were not available 1/2 unit of platelet was infused over 3 hours. \par Follow up CT Head on 10/11 was stable and the patient continued to receive 1/2 units of platelets over 3 hours every 12 hours. The patient received last dose of Inotuzamab Cycle 1 on 10/22. On 10/15 patient was febrile,  a CT of the chest/abd/pelvis was done which showed scattered patchy ground glass opacities in right upper and lower lobes, suggestive of infection, patient received a course of IV antibiotics for Pneumonia. On 11/2 Blood cultures were found to be (+) for Co-agulase (-) Staph and patient received a course of Vancomycin. \par Repeat CT Head was completed on 10/23 for follow up and showed some new bleeding into previous collection. Findings were discussed with Neuro Surgery. \par HLA platelets were administered when available for refractory thrombocytopenia. \par Cycle 2 Inotuzumab was started on 11/6 which was given on Days 1, 8, 15. \par Patient tolerated second cycle without any adverse reactions. Patient for possible future Haploidentical Allogeneic stem cell transplant after discharge. \par Pre-BMT testing completed prior to discharge: Echo, MUGA, Xray sinuses, 24-Hr Urine for Creatinine. \par Patient had intermittent severe headaches on 11/3 and 11/15, repeat CTs showed nearly resolved hemorrhage. Headaches were managed with analgesics Fentanyl patch 37 mcg/hr and Oxycodone IR 15 mg q3 prn prior to discharge home. She is on ponatinib QOD b/c of increased LFT's.  \par \par Patient underwent a haploidentical PBSCT on 2/13/19 (son), hospital course as follows:\par Ms. 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 Her transplant course was complicated by shingles. In October, 2018 she relapsed (confirmed via flow cytometry). She was treated with inotuzumab x 2 cycles. Her course was complicated by subdural hemorrhage, refractory thrombocytopenia requiring HLA matched platelets, pneumonia, and coag negative staph bacteremia (treated with vancomycin). She is now admitted for a haplo-identical peripheral blood stem cell transplant from her son. \par \par Upon admission, a TLC was placed in IR. Ms. Galeas received IV hydration, pain management, antiemetics, nutritional support, antiviral / antibacterial / antifungal / PCP / GI / VOD (SOS) prophylaxis. Labs were monitored on a daily basis, and she received electrolyte repletion and transfusional support as needed. \par \par Ms. Galeas had a relatively uncomplicated transplant course. A baseline CT of the head was done on admission given her history of SDH and refractory thrombocytopenia. The baseline CT was negative. Ms. Galeas did experience pancytopenia related to the high dose chemotherapy preparative regimen. Her thrombocytopenia was refractory, and was managed by infusing 1/2 unit of platelets twice daily over 3 hours. Ms. Galeas also experienced neutropenic fevers. When she became neutropenic, she was started on prophylactic ciprofloxacin. When she developed fevers, blood and urine cultures were sent, a CXR completed and the ciprofloxacin was changed to cefepime. Her cultures and CXR remained negative. \par \par On 2/13/19, Ms. Galeas received 280ml of fresh, mobilized, haplo-identical, HPC apheresis over approximately 1 hour. Cell counts as follows: \par Total MNCs (x 10^8/kg) = 4.36 \par CD34+ cells (x 10^6/kg) = 7.78 \par Cell Viability (%) = 100% \par \par Engraftment was noted on 2/27/19. Post engraftment, the cefepime and zarxio were discontinued. Tacrolimus levels and CMV PCR were monitored twice weekly. A FISH for Y was sent to determine chimerism, with results pending. \par Hospitalized in march for several weeks for  GVHD of gut ...upper...stage 1 ..overall grade 2 [de-identified] : Patient consents for a  follow up tele  visit post Allo-PBSCT (son) on 2/13/19. Patient is home.. Patient states she currently feeling well has aside from occ back discomfort.. No n/v/d...no covid sx. She states she had a previous stomach which resolved.. She notes she is following up with a pain management specialist for back pain. Patient notes she is taking Oxycodone 10mg for pain. Prednisone was discontinued on 6/26/19. Right PICC line has been removed. Denies fever/chills, skin rash, night sweats, mouth sores, eye dryness, blurred vision, nausea, vomiting, diarrhea. No CP, SOB or LE edema. Remains compliant with post transplant diet and crowd restrictions. Off Tacrolimus as of 1/20/2020.

## 2020-04-07 NOTE — REVIEW OF SYSTEMS
[Cough] : cough [Negative] : Allergic/Immunologic [Fever] : no fever [Chills] : no chills [Night Sweats] : no night sweats [Fatigue] : no fatigue [Muscle Weakness] : no muscle weakness [FreeTextEntry9] : back discomfort

## 2020-04-13 RX ORDER — ATOVAQUONE 750 MG/5ML
750 SUSPENSION ORAL TWICE DAILY
Qty: 300 | Refills: 3 | Status: ACTIVE | COMMUNITY
Start: 2019-03-04 | End: 1900-01-01

## 2020-05-01 ENCOUNTER — OUTPATIENT (OUTPATIENT)
Dept: OUTPATIENT SERVICES | Facility: HOSPITAL | Age: 60
LOS: 1 days | End: 2020-05-01
Payer: MEDICARE

## 2020-05-01 DIAGNOSIS — Z41.9 ENCOUNTER FOR PROCEDURE FOR PURPOSES OTHER THAN REMEDYING HEALTH STATE, UNSPECIFIED: Chronic | ICD-10-CM

## 2020-05-01 DIAGNOSIS — D17.9 BENIGN LIPOMATOUS NEOPLASM, UNSPECIFIED: Chronic | ICD-10-CM

## 2020-05-01 DIAGNOSIS — Z98.89 OTHER SPECIFIED POSTPROCEDURAL STATES: Chronic | ICD-10-CM

## 2020-05-01 DIAGNOSIS — Z94.84 STEM CELLS TRANSPLANT STATUS: ICD-10-CM

## 2020-05-01 DIAGNOSIS — C91.00 ACUTE LYMPHOBLASTIC LEUKEMIA NOT HAVING ACHIEVED REMISSION: ICD-10-CM

## 2020-05-01 PROCEDURE — G9001: CPT

## 2020-05-04 ENCOUNTER — OUTPATIENT (OUTPATIENT)
Dept: OUTPATIENT SERVICES | Facility: HOSPITAL | Age: 60
LOS: 1 days | Discharge: HOME | End: 2020-05-04

## 2020-05-04 DIAGNOSIS — C91.00 ACUTE LYMPHOBLASTIC LEUKEMIA NOT HAVING ACHIEVED REMISSION: ICD-10-CM

## 2020-05-04 DIAGNOSIS — Z41.9 ENCOUNTER FOR PROCEDURE FOR PURPOSES OTHER THAN REMEDYING HEALTH STATE, UNSPECIFIED: Chronic | ICD-10-CM

## 2020-05-04 DIAGNOSIS — D17.9 BENIGN LIPOMATOUS NEOPLASM, UNSPECIFIED: Chronic | ICD-10-CM

## 2020-05-04 DIAGNOSIS — Z98.89 OTHER SPECIFIED POSTPROCEDURAL STATES: Chronic | ICD-10-CM

## 2020-05-05 ENCOUNTER — APPOINTMENT (OUTPATIENT)
Dept: HEMATOLOGY ONCOLOGY | Facility: CLINIC | Age: 60
End: 2020-05-05

## 2020-05-12 ENCOUNTER — INPATIENT (INPATIENT)
Facility: HOSPITAL | Age: 60
LOS: 10 days | DRG: 834 | End: 2020-05-23
Attending: SPECIALIST | Admitting: HOSPITALIST
Payer: MEDICARE

## 2020-05-12 ENCOUNTER — APPOINTMENT (OUTPATIENT)
Dept: HEMATOLOGY ONCOLOGY | Facility: CLINIC | Age: 60
End: 2020-05-12

## 2020-05-12 VITALS
DIASTOLIC BLOOD PRESSURE: 79 MMHG | TEMPERATURE: 98 F | RESPIRATION RATE: 16 BRPM | SYSTOLIC BLOOD PRESSURE: 134 MMHG | WEIGHT: 229.94 LBS | HEART RATE: 75 BPM | OXYGEN SATURATION: 100 % | HEIGHT: 63 IN

## 2020-05-12 DIAGNOSIS — Z02.9 ENCOUNTER FOR ADMINISTRATIVE EXAMINATIONS, UNSPECIFIED: ICD-10-CM

## 2020-05-12 DIAGNOSIS — Z41.9 ENCOUNTER FOR PROCEDURE FOR PURPOSES OTHER THAN REMEDYING HEALTH STATE, UNSPECIFIED: Chronic | ICD-10-CM

## 2020-05-12 DIAGNOSIS — I10 ESSENTIAL (PRIMARY) HYPERTENSION: ICD-10-CM

## 2020-05-12 DIAGNOSIS — D17.9 BENIGN LIPOMATOUS NEOPLASM, UNSPECIFIED: Chronic | ICD-10-CM

## 2020-05-12 DIAGNOSIS — D72.829 ELEVATED WHITE BLOOD CELL COUNT, UNSPECIFIED: ICD-10-CM

## 2020-05-12 DIAGNOSIS — Z79.899 OTHER LONG TERM (CURRENT) DRUG THERAPY: ICD-10-CM

## 2020-05-12 DIAGNOSIS — R29.898 OTHER SYMPTOMS AND SIGNS INVOLVING THE MUSCULOSKELETAL SYSTEM: ICD-10-CM

## 2020-05-12 DIAGNOSIS — D61.818 OTHER PANCYTOPENIA: ICD-10-CM

## 2020-05-12 DIAGNOSIS — C95.90 LEUKEMIA, UNSPECIFIED NOT HAVING ACHIEVED REMISSION: ICD-10-CM

## 2020-05-12 DIAGNOSIS — Z98.89 OTHER SPECIFIED POSTPROCEDURAL STATES: Chronic | ICD-10-CM

## 2020-05-12 DIAGNOSIS — Z29.9 ENCOUNTER FOR PROPHYLACTIC MEASURES, UNSPECIFIED: ICD-10-CM

## 2020-05-12 DIAGNOSIS — N39.0 URINARY TRACT INFECTION, SITE NOT SPECIFIED: ICD-10-CM

## 2020-05-12 DIAGNOSIS — M54.5 LOW BACK PAIN: ICD-10-CM

## 2020-05-12 LAB
ALBUMIN SERPL ELPH-MCNC: 3.7 G/DL — SIGNIFICANT CHANGE UP (ref 3.3–5)
ALP SERPL-CCNC: 78 U/L — SIGNIFICANT CHANGE UP (ref 40–120)
ALT FLD-CCNC: 27 U/L — SIGNIFICANT CHANGE UP (ref 10–45)
ANION GAP SERPL CALC-SCNC: 12 MMOL/L — SIGNIFICANT CHANGE UP (ref 5–17)
ANISOCYTOSIS BLD QL: SLIGHT — SIGNIFICANT CHANGE UP
APPEARANCE UR: CLEAR — SIGNIFICANT CHANGE UP
APTT BLD: 16.6 SEC — LOW (ref 27.5–36.3)
AST SERPL-CCNC: 28 U/L — SIGNIFICANT CHANGE UP (ref 10–40)
BACTERIA # UR AUTO: ABNORMAL
BASOPHILS # BLD AUTO: 0 K/UL — SIGNIFICANT CHANGE UP (ref 0–0.2)
BASOPHILS NFR BLD AUTO: 0 % — SIGNIFICANT CHANGE UP (ref 0–2)
BILIRUB SERPL-MCNC: 0.3 MG/DL — SIGNIFICANT CHANGE UP (ref 0.2–1.2)
BILIRUB UR-MCNC: NEGATIVE — SIGNIFICANT CHANGE UP
BLASTS # FLD: 65 % — HIGH (ref 0–0)
BLD GP AB SCN SERPL QL: NEGATIVE — SIGNIFICANT CHANGE UP
BUN SERPL-MCNC: 28 MG/DL — HIGH (ref 7–23)
CALCIUM SERPL-MCNC: 10.1 MG/DL — SIGNIFICANT CHANGE UP (ref 8.4–10.5)
CHLORIDE SERPL-SCNC: 101 MMOL/L — SIGNIFICANT CHANGE UP (ref 96–108)
CO2 SERPL-SCNC: 20 MMOL/L — LOW (ref 22–31)
COLOR SPEC: YELLOW — SIGNIFICANT CHANGE UP
CREAT SERPL-MCNC: 1.02 MG/DL — SIGNIFICANT CHANGE UP (ref 0.5–1.3)
CRP SERPL-MCNC: 0.15 MG/DL — SIGNIFICANT CHANGE UP (ref 0–0.4)
D DIMER BLD IA.RAPID-MCNC: 796 NG/ML DDU — HIGH
DIFF PNL FLD: NEGATIVE — SIGNIFICANT CHANGE UP
EOSINOPHIL # BLD AUTO: 0 K/UL — SIGNIFICANT CHANGE UP (ref 0–0.5)
EOSINOPHIL NFR BLD AUTO: 0 % — SIGNIFICANT CHANGE UP (ref 0–6)
EPI CELLS # UR: 1 /HPF — SIGNIFICANT CHANGE UP
ERYTHROCYTE [SEDIMENTATION RATE] IN BLOOD: 29 MM/HR — HIGH (ref 0–20)
FIBRINOGEN PPP-MCNC: 293 MG/DL — LOW (ref 350–510)
GLUCOSE SERPL-MCNC: 109 MG/DL — HIGH (ref 70–99)
GLUCOSE UR QL: NEGATIVE — SIGNIFICANT CHANGE UP
HCT VFR BLD CALC: 19.3 % — CRITICAL LOW (ref 34.5–45)
HCT VFR BLD CALC: 19.7 % — CRITICAL LOW (ref 34.5–45)
HGB BLD-MCNC: 6 G/DL — CRITICAL LOW (ref 11.5–15.5)
HGB BLD-MCNC: 6.1 G/DL — CRITICAL LOW (ref 11.5–15.5)
HYALINE CASTS # UR AUTO: 2 /LPF — SIGNIFICANT CHANGE UP (ref 0–2)
HYPOCHROMIA BLD QL: SLIGHT — SIGNIFICANT CHANGE UP
INR BLD: 0.88 RATIO — SIGNIFICANT CHANGE UP (ref 0.88–1.16)
KETONES UR-MCNC: NEGATIVE — SIGNIFICANT CHANGE UP
LDH SERPL L TO P-CCNC: 214 U/L — SIGNIFICANT CHANGE UP (ref 50–242)
LEUKOCYTE ESTERASE UR-ACNC: ABNORMAL
LYMPHOCYTES # BLD AUTO: 34 % — SIGNIFICANT CHANGE UP (ref 13–44)
LYMPHOCYTES # BLD AUTO: 35.23 K/UL — HIGH (ref 1–3.3)
MACROCYTES BLD QL: SLIGHT — SIGNIFICANT CHANGE UP
MANUAL SMEAR VERIFICATION: SIGNIFICANT CHANGE UP
MCHC RBC-ENTMCNC: 31 GM/DL — LOW (ref 32–36)
MCHC RBC-ENTMCNC: 31.1 GM/DL — LOW (ref 32–36)
MCHC RBC-ENTMCNC: 31.1 PG — SIGNIFICANT CHANGE UP (ref 27–34)
MCHC RBC-ENTMCNC: 31.1 PG — SIGNIFICANT CHANGE UP (ref 27–34)
MCV RBC AUTO: 100 FL — SIGNIFICANT CHANGE UP (ref 80–100)
MCV RBC AUTO: 100.5 FL — HIGH (ref 80–100)
MONOCYTES # BLD AUTO: 0 K/UL — SIGNIFICANT CHANGE UP (ref 0–0.9)
MONOCYTES NFR BLD AUTO: 0 % — LOW (ref 2–14)
NEUTROPHILS # BLD AUTO: 1.04 K/UL — LOW (ref 1.8–7.4)
NEUTROPHILS NFR BLD AUTO: 1 % — LOW (ref 43–77)
NITRITE UR-MCNC: POSITIVE
NRBC # BLD: 0 /100 WBCS — SIGNIFICANT CHANGE UP (ref 0–0)
NRBC # BLD: 0 /100 — SIGNIFICANT CHANGE UP (ref 0–0)
OVALOCYTES BLD QL SMEAR: SLIGHT — SIGNIFICANT CHANGE UP
PH UR: 6 — SIGNIFICANT CHANGE UP (ref 5–8)
PLAT MORPH BLD: NORMAL — SIGNIFICANT CHANGE UP
PLATELET # BLD AUTO: 11 K/UL — CRITICAL LOW (ref 150–400)
PLATELET # BLD AUTO: 13 K/UL — CRITICAL LOW (ref 150–400)
POIKILOCYTOSIS BLD QL AUTO: SLIGHT — SIGNIFICANT CHANGE UP
POTASSIUM SERPL-MCNC: 5.4 MMOL/L — HIGH (ref 3.5–5.3)
POTASSIUM SERPL-SCNC: 5.4 MMOL/L — HIGH (ref 3.5–5.3)
PROT SERPL-MCNC: 7.2 G/DL — SIGNIFICANT CHANGE UP (ref 6–8.3)
PROT UR-MCNC: ABNORMAL
PROTHROM AB SERPL-ACNC: 10.1 SEC — SIGNIFICANT CHANGE UP (ref 10–12.9)
RBC # BLD: 1.93 M/UL — LOW (ref 3.8–5.2)
RBC # BLD: 1.96 M/UL — LOW (ref 3.8–5.2)
RBC # FLD: 17.1 % — HIGH (ref 10.3–14.5)
RBC # FLD: 17.2 % — HIGH (ref 10.3–14.5)
RBC BLD AUTO: ABNORMAL
RBC CASTS # UR COMP ASSIST: 1 /HPF — SIGNIFICANT CHANGE UP (ref 0–4)
RH IG SCN BLD-IMP: POSITIVE — SIGNIFICANT CHANGE UP
SARS-COV-2 RNA SPEC QL NAA+PROBE: SIGNIFICANT CHANGE UP
SODIUM SERPL-SCNC: 133 MMOL/L — LOW (ref 135–145)
SP GR SPEC: 1.04 — HIGH (ref 1.01–1.02)
URATE SERPL-MCNC: 8.4 MG/DL — HIGH (ref 2.5–7)
UROBILINOGEN FLD QL: NEGATIVE — SIGNIFICANT CHANGE UP
WBC # BLD: 103.62 K/UL — CRITICAL HIGH (ref 3.8–10.5)
WBC # BLD: 78.89 K/UL — CRITICAL HIGH (ref 3.8–10.5)
WBC # FLD AUTO: 103.62 K/UL — CRITICAL HIGH (ref 3.8–10.5)
WBC # FLD AUTO: 78.89 K/UL — CRITICAL HIGH (ref 3.8–10.5)
WBC UR QL: 94 /HPF — HIGH (ref 0–5)

## 2020-05-12 PROCEDURE — 72158 MRI LUMBAR SPINE W/O & W/DYE: CPT | Mod: 26

## 2020-05-12 PROCEDURE — 72131 CT LUMBAR SPINE W/O DYE: CPT | Mod: 26

## 2020-05-12 PROCEDURE — 70450 CT HEAD/BRAIN W/O DYE: CPT | Mod: 26

## 2020-05-12 PROCEDURE — 99291 CRITICAL CARE FIRST HOUR: CPT

## 2020-05-12 PROCEDURE — 74177 CT ABD & PELVIS W/CONTRAST: CPT | Mod: 26

## 2020-05-12 PROCEDURE — 72128 CT CHEST SPINE W/O DYE: CPT | Mod: 26

## 2020-05-12 PROCEDURE — 99223 1ST HOSP IP/OBS HIGH 75: CPT | Mod: GC

## 2020-05-12 PROCEDURE — 85060 BLOOD SMEAR INTERPRETATION: CPT

## 2020-05-12 RX ORDER — SODIUM CHLORIDE 9 MG/ML
500 INJECTION INTRAMUSCULAR; INTRAVENOUS; SUBCUTANEOUS
Refills: 0 | Status: DISCONTINUED | OUTPATIENT
Start: 2020-05-12 | End: 2020-05-12

## 2020-05-12 RX ORDER — ALLOPURINOL 300 MG
300 TABLET ORAL DAILY
Refills: 0 | Status: DISCONTINUED | OUTPATIENT
Start: 2020-05-12 | End: 2020-05-23

## 2020-05-12 RX ORDER — OXYCODONE HYDROCHLORIDE 5 MG/1
10 TABLET ORAL DAILY
Refills: 0 | Status: DISCONTINUED | OUTPATIENT
Start: 2020-05-12 | End: 2020-05-13

## 2020-05-12 RX ORDER — CEFTRIAXONE 500 MG/1
1000 INJECTION, POWDER, FOR SOLUTION INTRAMUSCULAR; INTRAVENOUS ONCE
Refills: 0 | Status: DISCONTINUED | OUTPATIENT
Start: 2020-05-12 | End: 2020-05-12

## 2020-05-12 RX ORDER — CEFTRIAXONE 500 MG/1
1000 INJECTION, POWDER, FOR SOLUTION INTRAMUSCULAR; INTRAVENOUS EVERY 24 HOURS
Refills: 0 | Status: COMPLETED | OUTPATIENT
Start: 2020-05-12 | End: 2020-05-18

## 2020-05-12 RX ORDER — MORPHINE SULFATE 50 MG/1
4 CAPSULE, EXTENDED RELEASE ORAL ONCE
Refills: 0 | Status: DISCONTINUED | OUTPATIENT
Start: 2020-05-12 | End: 2020-05-12

## 2020-05-12 RX ORDER — SODIUM CHLORIDE 9 MG/ML
500 INJECTION INTRAMUSCULAR; INTRAVENOUS; SUBCUTANEOUS
Refills: 0 | Status: DISCONTINUED | OUTPATIENT
Start: 2020-05-12 | End: 2020-05-23

## 2020-05-12 RX ORDER — SODIUM CHLORIDE 9 MG/ML
500 INJECTION INTRAMUSCULAR; INTRAVENOUS; SUBCUTANEOUS ONCE
Refills: 0 | Status: COMPLETED | OUTPATIENT
Start: 2020-05-12 | End: 2020-05-12

## 2020-05-12 RX ORDER — HYDROMORPHONE HYDROCHLORIDE 2 MG/ML
4 INJECTION INTRAMUSCULAR; INTRAVENOUS; SUBCUTANEOUS EVERY 4 HOURS
Refills: 0 | Status: DISCONTINUED | OUTPATIENT
Start: 2020-05-12 | End: 2020-05-12

## 2020-05-12 RX ORDER — HYDROXYUREA 500 MG/1
2000 CAPSULE ORAL ONCE
Refills: 0 | Status: COMPLETED | OUTPATIENT
Start: 2020-05-12 | End: 2020-05-12

## 2020-05-12 RX ADMIN — MORPHINE SULFATE 4 MILLIGRAM(S): 50 CAPSULE, EXTENDED RELEASE ORAL at 20:42

## 2020-05-12 RX ADMIN — MORPHINE SULFATE 4 MILLIGRAM(S): 50 CAPSULE, EXTENDED RELEASE ORAL at 18:24

## 2020-05-12 NOTE — ED PROVIDER NOTE - PROGRESS NOTE DETAILS
Pain controlled, MRI Pending. , heme consulted-added CBC w/diff/dimer/fibrinogen/LDH/Uric Acid. Will admit PT. Consent to Blood Transfusion Signed, witnessed by MOLLY -Carlos Comer PA-C

## 2020-05-12 NOTE — H&P ADULT - HISTORY OF PRESENT ILLNESS
60y F pmhx of Ph+ ALL, hx of SDH, chronic arthritic back pain p/w low back pain and leg weakness. Pt sent to ED by her hematologist office, Dr. Bentley, she had an appointment today for routine ALL blood work and could not get out of her car. Pt states back pain and leg weakness began getting worse about a month ago and has been bedridden for the past two weeks, put commode in bedroom due to inability to make it to bathroom. Pt lives alone with family nearby, normally ambulates with a cane but recently started using a walker until bedridden. Admits to a hx of falls, last fall 3 weeks ago, no head trauma, no LOC. Admits to chronic leg pain/stiffness that has worsened due to lack of activity. Pt denies IVDA, falls/trauma, dizziness, HA, syncope, change in vision, chest pain, SOB, cough, abdominal pain, n/v/d, dysuria, hematuria, change in stools, urinary or fecal retention/incontinence, or saddle anesthesia. prior episodes.    VS in the ED /79, HR 75, RR 16, T 98.3 F, saturating 100% RA 60y F pmhx of Ph+ ALL, hx of SDH, chronic arthritic back pain p/w low back pain and leg weakness. Pt sent to ED by her hematologist office, Dr. Bentley where she had an appointment today for routine blood work. Patient was profoundly week and could not even get out of her car, was referred by Dr Jacobo to the hospital for further evaluation. Additionally history attained from patients emergency contact Germaine her daughter in law. Patient had missed her routine appointment with hematology last week. Has been experiencing acute on chronic severe back pain . Initially had abck pain in 2013 however given the increased severity of her symptoms she visited an orthopedist 3 weeks about with MRI attained of lumbrosacral area. MRI reportedly only demonstrating arthritis. Patients LE weakness has become so severe where last week she was able to stand, now this week she is falling to he knees . Was given a walker for mothers day to assist with ambulation given the increased difficulty. Patient has experienced night sweats, but denies fever. Had reported to have been in remission following BM transplant in 2019 from her son. while her BM was in remission  however PCR was positive as of last note written in april  Patient lives alone with family near by.  Patient has had hx of falls, last fall 3 weeks ago, no head trauma, no LOC. Admits to chronic leg pain/stiffness that has worsened due to lack of activity. Pt denies IVDA, falls/trauma, dizziness, HA, syncope, change in vision, chest pain, SOB, cough, abdominal pain, n/v/d, dysuria, hematuria, change in stools, urinary or fecal retention/incontinence, or saddle anesthesia. prior episodes. She denies sick contact. Patient had been given last dose of inotuzamab in oct/ 2019 per outpatient records.     VS in the ED /79, HR 75, RR 16, T 98.3 F, saturating 100% RA 60y F pmhx of Ph+ ALL, hx of SDH, chronic arthritic back pain p/w low back pain and leg weakness. Pt sent to ED by her hematologist office, Dr. Bentley where she had an appointment today for routine blood work. Patient was profoundly week and could not even get out of her car, was referred by Dr Jacobo to the hospital for further evaluation. Additionally history attained from patients emergency contact Germaine her daughter in law. Patient had missed her routine appointment with hematology last week. Has been experiencing acute on chronic severe back pain . Initially had abck pain in 2013 however given the increased severity of her symptoms she visited an orthopedist 3 weeks about with MRI attained of lumbrosacral area. MRI reportedly only demonstrating arthritis. Patients LE weakness has become so severe where last week she was able to stand, now this week she is falling to he knees . Was given a walker for mothers day to assist with ambulation given the increased difficulty. Patient has experienced night sweats, but denies fever. Had reported to have been in remission following BM transplant in 2019 from her son. while her BM was in remission  however PCR was positive as of last note written in april  Patient lives alone with family near by.  Patient has had hx of falls, last fall 2 weeks ago, no head trauma, no LOC. Patient states since then she has experienced new b/l hand tingles and numbness and Le numbness and tingles with sensation in tact. Since then she has become more unstable. Admits to chronic leg pain/stiffness that has worsened due to lack of activity. Pt denies IVDA, falls/trauma, dizziness, HA, syncope, change in vision, chest pain, SOB, cough, abdominal pain, n/v/d, dysuria, hematuria, change in stools, urinary or fecal retention/incontinence, or saddle anesthesia prior episodes. She denies sick contact. Patient had been given last dose of inotuzamab in oct/ 2019 per outpatient records.     VS in the ED /79, HR 75, RR 16, T 98.3 F, saturating 100% RA 60y F pmhx of Ph+ ALL (s/p autologous SCT 2016 with subsequent remission, now s/p halpo-identicalSCT 2019), hx of SDH, chronic arthritic back pain p/w low back pain and leg weakness. Pt sent to ED by her hematologist office, Dr. Bentley where she had an appointment today for routine blood work. While trying to get to her appt, pt was profoundly weak and could not even get out of her car,  adn was referred by Dr Bentley to the hospital for further evaluation. Additional history obtained from patients emergency contact Germaine (daughter in law). Patient had missed her routine appointment with hematology last week. Has been experiencing acute on chronic severe back pain. Has been having ongoing back pain since 2013, however given the increased severity of her symptoms she visited an orthopedist 3 weeks ago and had MRI of lumbrosacral area, which reportedly only demonstrated arthritis. Patients LE weakness has progressed to the point where she was only able to stand last week, and this week she is unable to do even that, falling to her knees. Was given a walker for mothers day to assist with ambulation given the increased difficulty. Patient has experienced night sweats, but denies fever.   Had reported to have been in remission following BM transplant in 2019 from her son, however PCR was positive as of last note written in April.  Patient lives alone with family near by.  Patient has had hx of falls, last fall 2 weeks ago, no head trauma, no LOC. Patient states since then she has experienced new b/l hand tingling and numbness and LE numbness and tingling. Since then she has become more unstable. Admits to chronic leg pain/stiffness that has worsened due to lack of activity. Pt denies IVDA, falls/trauma, dizziness, HA, syncope, change in vision, chest pain, SOB, cough, abdominal pain, n/v/d, dysuria, hematuria, change in stools, urinary or fecal retention/incontinence, or saddle anesthesia prior episodes. She denies sick contact. Patient had been given last dose of inotuzamab in oct/ 2019 per outpatient records.     VS in the ED /79, HR 75, RR 16, T 98.3 F, saturating 100% RA

## 2020-05-12 NOTE — ED CLERICAL - BED REQUESTED
Review of Systems/Medical History  Patient summary reviewed  Chart reviewed  No history of anesthetic complications     Cardiovascular  Exercise tolerance (METS): >4,     Pulmonary       GI/Hepatic    GERD well controlled,             Endo/Other  Diabetes well controlled type 2 Oral agent,   Obesity    GYN       Hematology   Musculoskeletal       Neurology   Psychology   Depression ,              Physical Exam    Airway    Mallampati score: II  TM Distance: >3 FB  Neck ROM: full     Dental   No notable dental hx     Cardiovascular      Pulmonary      Other Findings        Anesthesia Plan  ASA Score- 2     Anesthesia Type- IV sedation with anesthesia with ASA Monitors  Additional Monitors:   Airway Plan:         Plan Factors-    Induction- intravenous  Postoperative Plan-     Informed Consent- Anesthetic plan and risks discussed with patient and spouse  I personally reviewed this patient with the CRNA  Discussed and agreed on the Anesthesia Plan with the CRNA  Alpesh Montoya 12-May-2020 21:33

## 2020-05-12 NOTE — ED PROVIDER NOTE - CARE PLAN
Principal Discharge DX:	Leukocytosis (leucocytosis)  Secondary Diagnosis:	Low back pain  Secondary Diagnosis:	Leg weakness, bilateral

## 2020-05-12 NOTE — H&P ADULT - PROBLEM SELECTOR PLAN 9
Transitions of Care Status:  1.  Name of PCP:Dr. Bentley   2.  PCP Contacted on Admission: [ ] Y    [ ] N    3.  PCP contacted at Discharge: [ ] Y    [ ] N    [ ] N/A  4.  Post-Discharge Appointment Date and Location:  5.  Summary of Handoff given to PCP: DVT PPX: will hold in the setting of severe thrombocytopenia   DIET: regular , dysphagia screen at bedside   Dispo: tentative , Sw consult, fall precautions
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
## 2020-05-12 NOTE — H&P ADULT - ATTENDING COMMENTS
Pt seen and examined. 60F with ALL s/p BMT x 2 (last 202019) having obtained remission, last with normal counts on 2/2020 (WBC/hb/Plt 9.2/14/173) p/w acute on chronic low back pain and significant LE weakness and inability to ambulate. Found with significant leukocytosis to 80K and anemia/thrombocytopenia, with automated diff showing 65% blasts - c/f relapsed ALL. s/p hydrea 2mg stat, c/w allopurinol, heme/onc to fully eval pt in AM; transfusing PRBC/platelets as needed. 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 Also with incidental UTI, c/w ceftriaxone for now.     Patient assigned to me by night hospitalist in charge for management and care for patient for this evening only. Care to be resumed by day hospitalist in the morning and thereafter. Pt seen and examined. 60F with ALL s/p BMT x 2 (last 202019) having obtained remission, last with normal counts on 2/2020 (WBC/hb/Plt 9.2/14/173) p/w acute on chronic low back pain and significant LE weakness and inability to ambulate. Found with significant leukocytosis to 80K and anemia/thrombocytopenia, with automated diff showing 65% blasts - c/f relapsed ALL. s/p hydrea 2mg stat, c/w allopurinol, heme/onc to fully eval pt in AM; transfusing PRBC/platelets as needed. 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 Also with incidental UTI, c/w ceftriaxone for now.     Patient assigned to me by night hospitalist in charge for management and care for patient for this evening only. Care to be resumed by day hospitalist in the morning and thereafter.

## 2020-05-12 NOTE — ED ADULT NURSE NOTE - OBJECTIVE STATEMENT
60y female with hx of leukemia, sciatica presents to the ER c/o b/l leg weakness. pt is alert and oriented x 4 and speaking coherently. pt states x 3 weeks she has had b/l leg and arm weakness and numbness. pt reports several falls from the weakness. pt went to [facility] today for a check up and was told to come in due to the weakness. pt c/o severe back pain, due to sciatica. pt denies cp, sob, fevers, n/v/d. GEORGES coleman completed. will reassess.
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
## 2020-05-12 NOTE — H&P ADULT - NSHPLABSRESULTS_GEN_ALL_CORE
6.1    78.89 )-----------( 13       ( 12 May 2020 21:30 )             19.7     05-12    133<L>  |  101  |  28<H>  ----------------------------<  109<H>  5.4<H>   |  20<L>  |  1.02    Ca    10.1      12 May 2020 18:32    TPro  7.2  /  Alb  3.7  /  TBili  0.3  /  DBili  x   /  AST  28  /  ALT  27  /  AlkPhos  78  05-12 6.1    78.89 )-----------( 13       ( 12 May 2020 21:30 )             19.7     05-12    133<L>  |  101  |  28<H>  ----------------------------<  109<H>  5.4<H>   |  20<L>  |  1.02    Ca    10.1      12 May 2020 18:32    TPro  7.2  /  Alb  3.7  /  TBili  0.3  /  DBili  x   /  AST  28  /  ALT  27  /  AlkPhos  78  05-12    < from: CT Lumbar Spine Reform No Cont (05.12.20 @ 20:06) >    CT chest 12/22/2016. MR lumbar spine 5/4/2018. CT abdomen/pelvis 5/12/2020.    THORACIC SPINE:    No acute fracture or dislocation. Normal facet joint alignment. The vertebral body heights and intervertebral disc spaces are maintained. Multilevel right-sided bridging osteophytes are noted throughout spanning through the T5-T10 vertebral bodies. No spinal canal stenosis.    0.7 cm groundglass nodule in the left upper lobe (2, 52), new since 12/22/2016.    The remainder of the visualized chest is unremarkable.    LUMBAR FINDINGS:    No acute fracture or dislocation. Normal facet joint alignment. The vertebral body heights and disc spaces are maintained. Multilevel facet joint arthropathy, most predominantly at L5-S1, isnoted.. No spinal canal stenosis. 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 There is a transitional type SI vertebral body.    Please refer to the dedicated report for CT abdomen/pelvis for evaluation of the intra-abdominal structures.    IMPRESSION:    No acute spinal pathology. Degenerative changes in the thoracic and lumbar spine as described above.        Groundglass nodule measuring 0.7 cm is noted in the left upper lobe. Follow-up noncontrast CT in 6 months is recommended to establish stability/resolution. 6.1    78.89 )-----------( 13       ( 12 May 2020 21:30 )             19.7     05-12    133<L>  |  101  |  28<H>  ----------------------------<  109<H>  5.4<H>   |  20<L>  |  1.02    Ca    10.1      12 May 2020 18:32    TPro  7.2  /  Alb  3.7  /  TBili  0.3  /  DBili  x   /  AST  28  /  ALT  27  /  AlkPhos  78  05-12    CT Lumbar Spine Reform No Cont:    CT chest 12/22/2016. MR lumbar spine 5/4/2018. CT abdomen/pelvis 5/12/2020.    THORACIC SPINE:    No acute fracture or dislocation. Normal facet joint alignment. The vertebral body heights and intervertebral disc spaces are maintained. Multilevel right-sided bridging osteophytes are noted throughout spanning through the T5-T10 vertebral bodies. No spinal canal stenosis.    0.7 cm groundglass nodule in the left upper lobe (2, 52), new since 12/22/2016.    The remainder of the visualized chest is unremarkable.    LUMBAR FINDINGS:    No acute fracture or dislocation. Normal facet joint alignment. The vertebral body heights and disc spaces are maintained. Multilevel facet joint arthropathy, most predominantly at L5-S1, isnoted.. No spinal canal stenosis. 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 There is a transitional type SI vertebral body.    Please refer to the dedicated report for CT abdomen/pelvis for evaluation of the intra-abdominal structures.    IMPRESSION:    No acute spinal pathology. Degenerative changes in the thoracic and lumbar spine as described above.        Groundglass nodule measuring 0.7 cm is noted in the left upper lobe. Follow-up noncontrast CT in 6 months is recommended to establish stability/resolution. Labs, imaging and EKG personally reviewed and interpreted by me.                   6.1    78.89 )-----------( 13       ( 12 May 2020 21:30 )             19.7     05-12    133<L>  |  101  |  28<H>  ----------------------------<  109<H>  5.4<H>   |  20<L>  |  1.02    Ca    10.1      12 May 2020 18:32    TPro  7.2  /  Alb  3.7  /  TBili  0.3  /  DBili  x   /  AST  28  /  ALT  27  /  AlkPhos  78  05-12    CT Lumbar Spine Reform No Cont:    CT chest 12/22/2016. MR lumbar spine 5/4/2018. CT abdomen/pelvis 5/12/2020.    THORACIC SPINE:    No acute fracture or dislocation. Normal facet joint alignment. The vertebral body heights and intervertebral disc spaces are maintained. Multilevel right-sided bridging osteophytes are noted throughout spanning through the T5-T10 vertebral bodies. No spinal canal stenosis.    0.7 cm groundglass nodule in the left upper lobe (2, 52), new since 12/22/2016.    The remainder of the visualized chest is unremarkable.    LUMBAR FINDINGS:    No acute fracture or dislocation. Normal facet joint alignment. The vertebral body heights and disc spaces are maintained. Multilevel facet joint arthropathy, most predominantly at L5-S1, isnoted.. No spinal canal stenosis. 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 There is a transitional type SI vertebral body.    Please refer to the dedicated report for CT abdomen/pelvis for evaluation of the intra-abdominal structures.    IMPRESSION:    No acute spinal pathology. Degenerative changes in the thoracic and lumbar spine as described above.        Groundglass nodule measuring 0.7 cm is noted in the left upper lobe. Follow-up noncontrast CT in 6 months is recommended to establish stability/resolution.

## 2020-05-12 NOTE — H&P ADULT - NSHPSOCIALHISTORY_GEN_ALL_CORE
Former smoker, denies current smoking, alcohol or illicit drug use Former smoker, denies current smoking, alcohol or illicit drug use. works at [employer]

## 2020-05-12 NOTE — CHART NOTE - NSCHARTNOTEFT_GEN_A_CORE
Hematology fellow on call    I was called by ER GEORGES vigil concerning this patient. Patient presented to ER from Dr. Bentley's office with weakness, has been bedridden for 2 weeks, back pain, RUQ Pain. No fever, vital signs stable, no bleeding.    discussed her case and laboratory findings with Dr. Bentley, patient's outpatient hematologist and Dr. Alatorre, overnight attending     Per Dr. 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 Give hydrea 2g stat x 1, start allopurinol 300mg PO daily, transfuse 1 unit packed rbcs, and 1 unit platelets, admit to medicine for further workup. appreciate neurosurgical input. no need for leukapheresis.     discussed with ER attending Dr. Garcia.    Hematology team to follow patient in AM on 5- with full consult    Jada Casarez DO  Hematology/Oncology Fellow  Pager [phone]

## 2020-05-12 NOTE — H&P ADULT - PROBLEM SELECTOR PLAN 7
Recent medications from february in [facility] do not match allscripts  [facility] to be called in the AM to confirm filled medication Medications from [facility] and [facility] do not match   team in AM to f/u and complete med rec CT Thoracic Spine No Cont: Groundglass nodule measuring 0.7 cm is noted in the left upper lobe. Follow-up noncontrast CT in 6 months is recommended  -To be followed up outpatient with repeat imaging

## 2020-05-12 NOTE — H&P ADULT - PROBLEM SELECTOR PLAN 10
Transitions of Care Status:  1.  Name of PCP:Dr. Bentley   2.  PCP Contacted on Admission: [ ] Y    [ ] N    3.  PCP contacted at Discharge: [ ] Y    [ ] N    [ ] N/A  4.  Post-Discharge Appointment Date and Location:  5.  Summary of Handoff given to PCP: Medications from [facility] and [facility] do not match   team in AM to f/u and complete med rec

## 2020-05-12 NOTE — H&P ADULT - PROBLEM SELECTOR PLAN 8
DVT PPX:   DIET: regular , dysphagia screen at bedside   Dispo: tentative , Sw consult, fall precautions DVT PPX: will hold in the setting of severe thrombocytopenia   DIET: regular , dysphagia screen at bedside   Dispo: tentative , Sw consult, fall precautions < from: CT Thoracic Spine No Cont (05.12.20 @ 20:06) >    Groundglass nodule measuring 0.7 cm is noted in the left upper lobe. Follow-up noncontrast CT in 6 months is recommended  -To be followed up outpatient with repeat imaging CT Thoracic Spine No Cont: Groundglass nodule measuring 0.7 cm is noted in the left upper lobe. Follow-up noncontrast CT in 6 months is recommended  -To be followed up outpatient with repeat imaging

## 2020-05-12 NOTE — H&P ADULT - PROBLEM SELECTOR PLAN 2
2/2 to Acute Blast crisis and likely recurrence of ALL  Will get 1 unit of PRBC and 1 unit of platelets   Post transfusion cbc to be followed  Maintain active type and screen significant anemia to Hb 6.0 and thrombocytopenia 13 (hb/plt 14/173 on 2/2020), in setting of relapsed ALL  Will get 1 unit of PRBC and 1 unit of platelets   check post transfusion cbc  keep Hb >7.0 and platelets >10 or >15 if febrile   Maintain active type and screen

## 2020-05-12 NOTE — ED ADULT NURSE NOTE - NSIMPLEMENTINTERV_GEN_ALL_ED
Implemented All Fall Risk Interventions:  [place] to call system. Call bell, personal items and telephone within reach. Instruct patient to call for assistance. Room bathroom lighting operational. Non-slip footwear when patient is off stretcher. Physically safe environment: no spills, clutter or unnecessary equipment. Stretcher in lowest position, wheels locked, appropriate side rails in place. Provide visual cue, wrist band, yellow gown, etc. Monitor gait and stability. Monitor for mental status changes and reorient to person, place, and time. Review medications for side effects contributing to fall risk. Reinforce activity limits and safety measures with patient and family.

## 2020-05-12 NOTE — CHART NOTE - NSCHARTNOTEFT_GEN_A_CORE
This report was requested by: Ora Ramirez | Reference #: [id number]    Others' Prescriptions  Patient Name: Kellen GaleasBirth Date: 1960  Address: [address] [address]Hza: Female  Rx Written	Rx Dispensed	Drug	Quantity	Days Supply	Prescriber Name	Payment Method	Dispenser  04/22/2020	04/23/2020	oxycodone hcl 10 mg tablet	90	30	Hadi, [facility]  03/25/2020	03/26/2020	oxycodone hcl 10 mg tablet	90	30	Hadi, [facility]  02/19/2020	02/20/2020	oxycodone hcl 10 mg tablet	90	30	Hadi, [facility]  01/31/2020	01/31/2020	oxycodone hcl 10 mg tablet	45	15	Kailey, [facility]  01/17/2020	01/17/2020	oxycodone hcl 5 mg tablet	28	7	Kailey, [facility]  08/01/2019	08/01/2019	oxycodone hcl 5 mg tablet	60	15	[facility], [facility] M	[id number]    Patient Name: Kellen GaleasBirth Date: 1960  Address: [address] [place] [id number]: Female  Rx Written	Rx Dispensed	Drug	Quantity	Days Supply	Prescriber Name	Payment Method	Dispenser  08/29/2019	08/29/2019	lyrica 100 mg capsule	90	30	Mc Durant MD	[facility]  08/29/2019	08/29/2019	lyrica 50 mg capsule	90	30	Mc Durant MD	[facility]  06/06/2019	06/07/2019	zolpidem tartrate 5 mg tablet	30	30	Karol, Germaine M	[facility]  * - Drugs marked with an asterisk are compound drugs. If the compound drug is made up of more than one controlled substance, then each controlled substance will be a separate row in the

## 2020-05-12 NOTE — H&P ADULT - NSHPPHYSICALEXAM_GEN_ALL_CORE
T(C): 36.8 (05-12-20 @ 15:31), Max: 36.8 (05-12-20 @ 15:31)  HR: 86 (05-12-20 @ 20:40) (75 - 86)  BP: 132/89 (05-12-20 @ 20:40) (119/58 - 134/79)  RR: 18 (05-12-20 @ 20:40) (16 - 18)  SpO2: 100% (05-12-20 @ 20:40) (100% - 100%)  PHYSICAL EXAM:  GENERAL: NAD, well-developed  HEAD:  Atraumatic, Normocephalic  EYES: EOMI, PERRLA, conjunctiva and sclera clear  NECK: Supple, No JVD  CHEST/LUNG: Clear to auscultation bilaterally; No wheeze  HEART: Regular rate and rhythm; No murmurs, rubs, or gallops  ABDOMEN: Soft, Nontender, Nondistended; Bowel sounds present  EXTREMITIES:, No clubbing, cyanosis, or edema  PSYCH: AAOx3  NEUROLOGY: non-focal  SKIN: No rashes or lesions Unable to perform as patient was in MRI both times provider attempted to evaluate T(C): 36.8 (05-12-20 @ 15:31), Max: 36.8 (05-12-20 @ 15:31)  HR: 86 (05-12-20 @ 20:40) (75 - 86)  BP: 132/89 (05-12-20 @ 20:40) (119/58 - 134/79)  RR: 18 (05-12-20 @ 20:40) (16 - 18)  SpO2: 100% (05-12-20 @ 20:40) (100% - 100%)  PHYSICAL EXAM:  GENERAL: NAD, well-developed  HEAD:  Atraumatic, Normocephalic  EYES: EOMI, PERRLA, conjunctiva and sclera clear  NECK: Supple, No JVD  CHEST/LUNG: Clear to auscultation bilaterally; No wheeze  HEART: Regular rate and rhythm; Normal s1s2   ABDOMEN: Soft, Nontender, distended, + bowel sounds   BACK: tender to palpation along cervical and thoracic spine with no overt paraspinal muscle tenderness  , appears to have lordosis   EXTREMITIES:, No clubbing, cyanosis, or edema  PSYCH: AAOx3  NEUROLOGY: sensation in tact b/l, 3/5 strength on the rle and 2/5 strength LLE, 5/5 strength b/l UPE   SKIN: No rashes or lesions T(C): 36.8 (05-12-20 @ 15:31), Max: 36.8 (05-12-20 @ 15:31)  HR: 86 (05-12-20 @ 20:40) (75 - 86)  BP: 132/89 (05-12-20 @ 20:40) (119/58 - 134/79)  RR: 18 (05-12-20 @ 20:40) (16 - 18)  SpO2: 100% (05-12-20 @ 20:40) (100% - 100%)    PHYSICAL EXAM:  GENERAL: NAD, well-developed  HEAD:  Atraumatic, Normocephalic  EYES: EOMI, PERRLA, conjunctiva and sclera clear  NECK: Supple, No JVD  CHEST/LUNG: Clear to auscultation bilaterally; No wheeze  HEART: Regular rate and rhythm; Normal s1s2   ABDOMEN: Soft, Nontender, distended, + bowel sounds   BACK: tender to palpation along cervical and thoracic spine with no overt paraspinal muscle tenderness  , appears to have lordosis   EXTREMITIES:, No clubbing, cyanosis, or edema  PSYCH: AAOx3  NEUROLOGY: sensation in tact b/l, 3/5 strength on the rle and 2/5 strength LLE, 5/5 strength b/l UPE   SKIN: No rashes or lesions T(C): 36.8 (05-12-20 @ 15:31), Max: 36.8 (05-12-20 @ 15:31)  HR: 86 (05-12-20 @ 20:40) (75 - 86)  BP: 132/89 (05-12-20 @ 20:40) (119/58 - 134/79)  RR: 18 (05-12-20 @ 20:40) (16 - 18)  SpO2: 100% (05-12-20 @ 20:40) (100% - 100%)    PHYSICAL EXAM:  GENERAL: NAD, morbidly obese   HEAD:  Atraumatic, Normocephalic  EYES: EOMI, conjunctiva and sclera clear  NECK: Supple, No JVD  CHEST/LUNG: Clear to auscultation bilaterally; No wheeze  HEART: Regular rate and rhythm; Normal s1s2   ABDOMEN: Soft, Nontender, distended, + bowel sounds   BACK: tender to palpation along cervical and thoracic spine with no overt paraspinal muscle tenderness, appears to have lordosis   EXTREMITIES:, No clubbing, cyanosis, or edema  PSYCH: AAOx3  NEUROLOGY: sensation in tact b/l, 3/5 strength on the rle and 2/5 strength LLE, 5/5 strength b/l UPE   SKIN: scattered ecchymoses

## 2020-05-12 NOTE — H&P ADULT - NSHPREVIEWOFSYSTEMS_GEN_ALL_CORE
REVIEW OF SYSTEMS:  CONSTITUTIONAL: No weakness, fevers or chills  EYES/ENT: No visual changes;  No vertigo or throat pain   NECK: No pain or stiffness  RESPIRATORY: No cough, wheezing, hemoptysis; No shortness of breath  CARDIOVASCULAR: No chest pain or palpitations  GASTROINTESTINAL: No abdominal or epigastric pain. No nausea, vomiting, or hematemesis; No diarrhea or constipation. No melena or hematochezia.  GENITOURINARY: No dysuria, frequency or hematuria  NEUROLOGICAL: No numbness or weakness  SKIN: No itching, burning, rashes, or lesions   All other review of systems is negative unless indicated above. REVIEW OF SYSTEMS:    CONSTITUTIONAL: No fevers, chills  EYES/ENT: No visual changes;  no throat pain   NECK: No pain or stiffness  RESPIRATORY: No cough, no shortness of breath  CARDIOVASCULAR: No chest pain or palpitations  GASTROINTESTINAL: no nausea, vomiting, no abdominal pain, no BRBPR  GENITOURINARY: no hematuria, no dysuria  NEUROLOGICAL: as per HPI  MUSCULOSKELETAL: +back pain, +weakness as per HPI  SKIN: No rashes, or lesions   PSYCH: no anxiety, depression  HEME: no gum bleeding, no bruising

## 2020-05-12 NOTE — ED CLERICAL - NS ED CLERK NOTE PRE-ARRIVAL INFORMATION; ADDITIONAL PRE-ARRIVAL INFORMATION
CC/Reason For referral:  r/o cord compression, c/o severe lower back pain, unable to stand  Preferred Consultant(if applicable): hem/onc fellow  Who admits for you (if needed): hospitalist  Do you have documents you would like to fax over? NO  Would you still like to speak to an ED attending? YES  pt arrived at [facility] unable to stand or get out of car due to severe lower back pain CC/Reason For referral:  r/o cord compression, c/o severe lower back pain, unable to stand  Preferred Consultant(if applicable): hem/onc fellow  Who admits for you (if needed): hospitalist  Do you have documents you would like to fax over? NO  Would you still like to speak to an ED attending? YES  pt arrived at 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

## 2020-05-12 NOTE — H&P ADULT - PROBLEM SELECTOR PLAN 6
U/A positive , bacteria in urine, + LE   + frequency, no hesitancy or dysuria   C/W ceftriaxone 1 gram q24 hours U/A positive, bacteria in urine, + LE   + frequency, no hesitancy or dysuria   C/W ceftriaxone 1 gram q24 hours

## 2020-05-12 NOTE — H&P ADULT - PROBLEM SELECTOR PLAN 3
Rule out Cord compression Patient with acute on chronic back pain, recently with normal MR spine   CT: Multilevel facet joint arthropathy, most predominantly at L5-S1, is noted. No spinal canal stenosis. 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 No spinal canal stenosis. 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 No spinal canal stenosis. 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 There is a transitional type SI vertebral body.Degenerative joint disease in thoracic and lumbar spine   MR lumbar: with no evidence of cord compression official read pending   Will need to assess cervical and thoracic given new findings of numbness/ tingles with gait intability   Will continue with home oxy for pain,  will ISTOP  MR cervical, MR thoracic spine pending, requested by NS given reports of numbness and new gait instability Patient with acute on chronic back pain, recently with normal MR spine   CT: Multilevel facet joint arthropathy, most predominantly at L5-S1, is noted. No spinal canal stenosis. 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 There is a transitional type SI vertebral body.Degenerative joint disease in thoracic and lumbar spine   MR lumbar: with no evidence of cord compression official read pending   Will need to assess cervical and thoracic given new findings of numbness/ tingles with gait instability   Will continue with home oxy for pain,  will ISTOPP  MR cervical, MR thoracic spine pending, requested by NS given reports of numbness and new gait instability Patient with acute on chronic back pain, recently with normal MR spine   CT w multilevel facet joint arthropathy and degenerative joint disease in thoracic and lumbar spine   MR lumbar with no evidence of cord compression (official read pending)   Will need to assess cervical and thoracic given new findings of numbness/ tingles with gait instability with MRI oer NSX - f/u MRI cervical/thoracic spine   Will continue with home oxy for pain,  ISTOP in chart Patient with acute on chronic back pain, recently with normal MR spine   CT w multilevel facet joint arthropathy and degenerative joint disease in thoracic and lumbar spine   MR lumbar with no evidence of cord compression (official read pending)   Will need to assess cervical and thoracic given new findings of numbness/ tingles with gait instability with MRI oer NSX - f/u MRI cervical/thoracic spine   pain control with lido patch; tylenol or oxycodone 5mg PO or morphine 4mg IVP as needed

## 2020-05-12 NOTE — H&P ADULT - ASSESSMENT
60y F pmhx of Ph+ ALL, hx of SDH, chronic arthritic back pain p/w low back pain and  profound weakness, found to have been pancytopenic in Blast crisis concern for recurrence of ALL, admission for further evaluation and management 60y F pmhx of Ph+ ALL, hx of SDH, chronic arthritic back pain p/w acute on chronic low back pain and profound b/l LE weakness, found with leucocytosis and pancytopenia concerning for relapse ALL, admitted for further evaluation and management.

## 2020-05-12 NOTE — H&P ADULT - PROBLEM SELECTOR PLAN 1
High likelihood or recurrence of ALL per heme, given pancytopenia, while BM in past had demonstrated remission PCR had been positive   Patient appears to be in blast crisis to receive allopurinol, hydroxyurea 2 grams   will hold mepron, Tacrolimus, valganciclovir  Currently patient afebrile will hold off on epimeric abx, monitor fever curve if febrile will dose cefepime 2 grams q8 and attain blood cultures  Bone marrow Transplant/ hematology consulted and will continue to monitor  Will get 1 unit of PRBC and 1 unit of platelets   Post transfusion cbc to be followed  Maintain active type and screen High likelihood or recurrence of ALL per heme, given pancytopenia, while BM in past had demonstrated remission PCR had been positive   Patient appears to be in blast crisis to receive allopurinol, hydroxyurea 2 grams   Spoke to heme fellow will hold mepron, Tacrolimus, valganciclovir  Currently patient afebrile will c/w ceftriaxone for UTI, and monitor fever curve if febrile will  broaden to  cefepime 2 grams q8 and attain blood cultures  Bone marrow Transplant/ hematology consulted and will continue to monitor  Will get 1 unit of PRBC and 1 unit of platelets   Post transfusion cbc to be followed  Maintain active type and screen WBC of ~80k (last 9.2 in 2/2020) likely 2/2 relapsed ALL, 65% blasts on CBC   Heme recs appreciated -  s/p hydroxyurea 2 grams state, will start allopurinol 300mg PO daily   Spoke to heme fellow will hold mepron, valganciclovir for now  Currently patient afebrile - will c/w ceftriaxone for UTI, and monitor fever curve; if febrile will  broaden to cefepime 2 grams q8 and obtain blood cultures  Bone marrow Transplant/ hematology consulted and will continue to monitor  Will get 1 unit of PRBC and 1 unit of platelets   Post transfusion cbc to be followed  Maintain active type and screen

## 2020-05-12 NOTE — H&P ADULT - PROBLEM SELECTOR PLAN 4
Will hold antihypertensives for now and monitor with vitals q8 BP currently soft  Will hold home antihypertensives for now and monitor with vitals q8

## 2020-05-12 NOTE — ED PROVIDER NOTE - ATTENDING CONTRIBUTION TO CARE
RGUJRAL 61yo f hx ALL s/p remission presents with lower back pain and LE weakness x weeks. Patient went to her oncologist today and sent in for evaluation for cord compression. Pt denies any falls. No numbness or incontinence. Denies any recent illness, cough, URI, fever, chills.  On exam, Patient is awake,alert,oriented x 3. Patient is well appearing and in no acute distress. Patient's chest is clear to ausculation, +s1s2. Abdomen is soft nd/nt +BS. Back + L spine diffuse tender. NO C or T spine tenderness. Extremity with no swelling or calf tenderness. NO saddle anesthesia.  Pt with 1/5 B/L LE + sensation. 2+ DP.   Check labs, CT/MRI. Spine consult.

## 2020-05-12 NOTE — ED PROVIDER NOTE - OBJECTIVE STATEMENT
60y F pmhx of Ph+ ALL, hx of SDH, chronic arthritic back pain p/w low back pain and leg weakness. Pt sent to ED by her hematologist office, Dr. Bentley, she had an appointment today for routine ALL blood work and could not get out of her car. Pt states back pain and leg weakness began getting worse about a month ago and has been bedridden for the past two weeks, put commode in bedroom due to inability to make it to bathroom. Pt lives alone with family nearby, normally ambulates with a cane but recently started using a walker until bedridden. Admits to a hx of falls, last fall 3 weeks ago, no head trauma, no LOC. Admits to chronic leg pain/stiffness that has worsened due to lack of activity. Pt denies IVDA, falls/trauma, dizziness, HA, syncope, change in vision, chest pain, SOB, cough, abdominal pain, n/v/d, dysuria, hematuria, change in stools, urinary or fecal retention/incontinence, or saddle anesthesia. prior episodes.

## 2020-05-13 ENCOUNTER — RX RENEWAL (OUTPATIENT)
Age: 60
End: 2020-05-13

## 2020-05-13 DIAGNOSIS — D75.89 OTHER SPECIFIED DISEASES OF BLOOD AND BLOOD-FORMING ORGANS: ICD-10-CM

## 2020-05-13 DIAGNOSIS — C91.00 ACUTE LYMPHOBLASTIC LEUKEMIA NOT HAVING ACHIEVED REMISSION: ICD-10-CM

## 2020-05-13 DIAGNOSIS — M54.5 LOW BACK PAIN: ICD-10-CM

## 2020-05-13 DIAGNOSIS — R91.1 SOLITARY PULMONARY NODULE: ICD-10-CM

## 2020-05-13 LAB
ALBUMIN SERPL ELPH-MCNC: 3.5 G/DL — SIGNIFICANT CHANGE UP (ref 3.3–5)
ALP SERPL-CCNC: 71 U/L — SIGNIFICANT CHANGE UP (ref 40–120)
ALT FLD-CCNC: 24 U/L — SIGNIFICANT CHANGE UP (ref 10–45)
ANION GAP SERPL CALC-SCNC: 12 MMOL/L — SIGNIFICANT CHANGE UP (ref 5–17)
APTT BLD: 25.2 SEC — LOW (ref 27.5–36.3)
AST SERPL-CCNC: 24 U/L — SIGNIFICANT CHANGE UP (ref 10–40)
BILIRUB SERPL-MCNC: 0.2 MG/DL — SIGNIFICANT CHANGE UP (ref 0.2–1.2)
BUN SERPL-MCNC: 27 MG/DL — HIGH (ref 7–23)
CALCIUM SERPL-MCNC: 9.6 MG/DL — SIGNIFICANT CHANGE UP (ref 8.4–10.5)
CHLORIDE SERPL-SCNC: 103 MMOL/L — SIGNIFICANT CHANGE UP (ref 96–108)
CO2 SERPL-SCNC: 22 MMOL/L — SIGNIFICANT CHANGE UP (ref 22–31)
CREAT SERPL-MCNC: 1.04 MG/DL — SIGNIFICANT CHANGE UP (ref 0.5–1.3)
D DIMER BLD IA.RAPID-MCNC: 723 NG/ML DDU — HIGH
FIBRINOGEN PPP-MCNC: 320 MG/DL — LOW (ref 350–510)
GLUCOSE SERPL-MCNC: 144 MG/DL — HIGH (ref 70–99)
HAPTOGLOB SERPL-MCNC: 96 MG/DL — SIGNIFICANT CHANGE UP (ref 34–200)
HCT VFR BLD CALC: 22 % — LOW (ref 34.5–45)
HGB BLD-MCNC: 6.9 G/DL — CRITICAL LOW (ref 11.5–15.5)
INR BLD: 0.93 RATIO — SIGNIFICANT CHANGE UP (ref 0.88–1.16)
LDH SERPL L TO P-CCNC: 161 U/L — SIGNIFICANT CHANGE UP (ref 50–242)
MAGNESIUM SERPL-MCNC: 2.2 MG/DL — SIGNIFICANT CHANGE UP (ref 1.6–2.6)
MANUAL DIF COMMENT BLD-IMP: SIGNIFICANT CHANGE UP
MCHC RBC-ENTMCNC: 30.8 PG — SIGNIFICANT CHANGE UP (ref 27–34)
MCHC RBC-ENTMCNC: 31.4 GM/DL — LOW (ref 32–36)
MCV RBC AUTO: 98.2 FL — SIGNIFICANT CHANGE UP (ref 80–100)
NRBC # BLD: 0 /100 WBCS — SIGNIFICANT CHANGE UP (ref 0–0)
PHOSPHATE SERPL-MCNC: 4.3 MG/DL — SIGNIFICANT CHANGE UP (ref 2.5–4.5)
PLATELET # BLD AUTO: 35 K/UL — LOW (ref 150–400)
PLATELET # BLD AUTO: 37 K/UL — LOW (ref 150–400)
POTASSIUM SERPL-MCNC: 4.3 MMOL/L — SIGNIFICANT CHANGE UP (ref 3.5–5.3)
POTASSIUM SERPL-SCNC: 4.3 MMOL/L — SIGNIFICANT CHANGE UP (ref 3.5–5.3)
PROT SERPL-MCNC: 6.6 G/DL — SIGNIFICANT CHANGE UP (ref 6–8.3)
PROTHROM AB SERPL-ACNC: 10.6 SEC — SIGNIFICANT CHANGE UP (ref 10–12.9)
RBC # BLD: 2.24 M/UL — LOW (ref 3.8–5.2)
RBC # FLD: 16.3 % — HIGH (ref 10.3–14.5)
SODIUM SERPL-SCNC: 137 MMOL/L — SIGNIFICANT CHANGE UP (ref 135–145)
TSH SERPL-MCNC: 2.17 UIU/ML — SIGNIFICANT CHANGE UP (ref 0.27–4.2)
URATE SERPL-MCNC: 8.2 MG/DL — HIGH (ref 2.5–7)
WBC # BLD: 31.58 K/UL — HIGH (ref 3.8–10.5)
WBC # FLD AUTO: 31.58 K/UL — HIGH (ref 3.8–10.5)

## 2020-05-13 PROCEDURE — G0452: CPT | Mod: 26

## 2020-05-13 PROCEDURE — 99223 1ST HOSP IP/OBS HIGH 75: CPT

## 2020-05-13 PROCEDURE — 99233 SBSQ HOSP IP/OBS HIGH 50: CPT

## 2020-05-13 PROCEDURE — 99233 SBSQ HOSP IP/OBS HIGH 50: CPT | Mod: GC

## 2020-05-13 PROCEDURE — 99223 1ST HOSP IP/OBS HIGH 75: CPT | Mod: GC

## 2020-05-13 PROCEDURE — 88189 FLOWCYTOMETRY/READ 16 & >: CPT

## 2020-05-13 RX ORDER — OXYCODONE HYDROCHLORIDE 5 MG/1
5 TABLET ORAL EVERY 6 HOURS
Refills: 0 | Status: DISCONTINUED | OUTPATIENT
Start: 2020-05-13 | End: 2020-05-17

## 2020-05-13 RX ORDER — PANTOPRAZOLE SODIUM 20 MG/1
40 TABLET, DELAYED RELEASE ORAL
Refills: 0 | Status: DISCONTINUED | OUTPATIENT
Start: 2020-05-13 | End: 2020-05-23

## 2020-05-13 RX ORDER — LEVETIRACETAM 250 MG/1
500 TABLET, FILM COATED ORAL
Refills: 0 | Status: DISCONTINUED | OUTPATIENT
Start: 2020-05-13 | End: 2020-05-23

## 2020-05-13 RX ORDER — MORPHINE SULFATE 50 MG/1
2 CAPSULE, EXTENDED RELEASE ORAL ONCE
Refills: 0 | Status: DISCONTINUED | OUTPATIENT
Start: 2020-05-13 | End: 2020-05-13

## 2020-05-13 RX ORDER — SENNA PLUS 8.6 MG/1
2 TABLET ORAL AT BEDTIME
Refills: 0 | Status: DISCONTINUED | OUTPATIENT
Start: 2020-05-13 | End: 2020-05-18

## 2020-05-13 RX ORDER — POLYETHYLENE GLYCOL 3350 17 G/17G
17 POWDER, FOR SOLUTION ORAL DAILY
Refills: 0 | Status: DISCONTINUED | OUTPATIENT
Start: 2020-05-13 | End: 2020-05-18

## 2020-05-13 RX ORDER — MORPHINE SULFATE 50 MG/1
4 CAPSULE, EXTENDED RELEASE ORAL EVERY 6 HOURS
Refills: 0 | Status: DISCONTINUED | OUTPATIENT
Start: 2020-05-13 | End: 2020-05-13

## 2020-05-13 RX ORDER — HYDROMORPHONE HYDROCHLORIDE 2 MG/ML
1 INJECTION INTRAMUSCULAR; INTRAVENOUS; SUBCUTANEOUS EVERY 6 HOURS
Refills: 0 | Status: DISCONTINUED | OUTPATIENT
Start: 2020-05-13 | End: 2020-05-14

## 2020-05-13 RX ORDER — URSODIOL 250 MG/1
300 TABLET, FILM COATED ORAL EVERY 12 HOURS
Refills: 0 | Status: DISCONTINUED | OUTPATIENT
Start: 2020-05-13 | End: 2020-05-23

## 2020-05-13 RX ORDER — HYDROXYUREA 500 MG/1
1000 CAPSULE ORAL
Refills: 0 | Status: DISCONTINUED | OUTPATIENT
Start: 2020-05-13 | End: 2020-05-14

## 2020-05-13 RX ORDER — IMATINIB MESYLATE 400 MG/1
400 TABLET, FILM COATED ORAL
Refills: 0 | Status: DISCONTINUED | OUTPATIENT
Start: 2020-05-13 | End: 2020-05-23

## 2020-05-13 RX ORDER — LIDOCAINE 4 G/100G
1 CREAM TOPICAL EVERY 24 HOURS
Refills: 0 | Status: DISCONTINUED | OUTPATIENT
Start: 2020-05-13 | End: 2020-05-23

## 2020-05-13 RX ADMIN — OXYCODONE HYDROCHLORIDE 5 MILLIGRAM(S): 5 TABLET ORAL at 19:59

## 2020-05-13 RX ADMIN — SODIUM CHLORIDE 75 MILLILITER(S): 9 INJECTION INTRAMUSCULAR; INTRAVENOUS; SUBCUTANEOUS at 07:30

## 2020-05-13 RX ADMIN — CEFTRIAXONE 100 MILLIGRAM(S): 500 INJECTION, POWDER, FOR SOLUTION INTRAMUSCULAR; INTRAVENOUS at 22:55

## 2020-05-13 RX ADMIN — HYDROXYUREA 1000 MILLIGRAM(S): 500 CAPSULE ORAL at 12:03

## 2020-05-13 RX ADMIN — LIDOCAINE 1 PATCH: 4 CREAM TOPICAL at 08:07

## 2020-05-13 RX ADMIN — HYDROXYUREA 2000 MILLIGRAM(S): 500 CAPSULE ORAL at 00:15

## 2020-05-13 RX ADMIN — LIDOCAINE 1 PATCH: 4 CREAM TOPICAL at 02:56

## 2020-05-13 RX ADMIN — LEVETIRACETAM 500 MILLIGRAM(S): 250 TABLET, FILM COATED ORAL at 17:22

## 2020-05-13 RX ADMIN — HYDROXYUREA 1000 MILLIGRAM(S): 500 CAPSULE ORAL at 22:56

## 2020-05-13 RX ADMIN — CEFTRIAXONE 100 MILLIGRAM(S): 500 INJECTION, POWDER, FOR SOLUTION INTRAMUSCULAR; INTRAVENOUS at 00:14

## 2020-05-13 RX ADMIN — SODIUM CHLORIDE 500 MILLILITER(S): 9 INJECTION INTRAMUSCULAR; INTRAVENOUS; SUBCUTANEOUS at 00:14

## 2020-05-13 RX ADMIN — MORPHINE SULFATE 4 MILLIGRAM(S): 50 CAPSULE, EXTENDED RELEASE ORAL at 08:29

## 2020-05-13 RX ADMIN — LIDOCAINE 1 PATCH: 4 CREAM TOPICAL at 15:14

## 2020-05-13 RX ADMIN — HYDROMORPHONE HYDROCHLORIDE 1 MILLIGRAM(S): 2 INJECTION INTRAMUSCULAR; INTRAVENOUS; SUBCUTANEOUS at 22:55

## 2020-05-13 RX ADMIN — OXYCODONE HYDROCHLORIDE 5 MILLIGRAM(S): 5 TABLET ORAL at 02:57

## 2020-05-13 RX ADMIN — MORPHINE SULFATE 2 MILLIGRAM(S): 50 CAPSULE, EXTENDED RELEASE ORAL at 06:15

## 2020-05-13 RX ADMIN — SENNA PLUS 2 TABLET(S): 8.6 TABLET ORAL at 22:56

## 2020-05-13 RX ADMIN — OXYCODONE HYDROCHLORIDE 5 MILLIGRAM(S): 5 TABLET ORAL at 12:03

## 2020-05-13 RX ADMIN — IMATINIB MESYLATE 400 MILLIGRAM(S): 400 TABLET, FILM COATED ORAL at 15:32

## 2020-05-13 RX ADMIN — OXYCODONE HYDROCHLORIDE 10 MILLIGRAM(S): 5 TABLET ORAL at 00:15

## 2020-05-13 RX ADMIN — PANTOPRAZOLE SODIUM 40 MILLIGRAM(S): 20 TABLET, DELAYED RELEASE ORAL at 08:35

## 2020-05-13 RX ADMIN — URSODIOL 300 MILLIGRAM(S): 250 TABLET, FILM COATED ORAL at 17:22

## 2020-05-13 RX ADMIN — Medication 300 MILLIGRAM(S): at 12:02

## 2020-05-13 RX ADMIN — Medication 300 MILLIGRAM(S): at 00:14

## 2020-05-13 NOTE — PROGRESS NOTE ADULT - PROBLEM SELECTOR PLAN 2
significant anemia to Hb 6.0 and thrombocytopenia 13 (hb/plt 14/173 on 2/2020), in setting of relapsed ALL  S/p 1 unit of PRBC and 1 unit of platelets   keep Hb >7.0 and platelets >10 or >15 if febrile   Maintain active type and screen significant anemia to Hb 6.0 and thrombocytopenia 13 (hb/plt 14/173 on 2/2020), in setting of relapsed ALL  S/p 1 unit of PRBC and 1 unit of platelets   - transfuse for hgb<7 and/or platelets <10K  Maintain active type and screen

## 2020-05-13 NOTE — PHYSICAL THERAPY INITIAL EVALUATION ADULT - PERTINENT HX OF CURRENT PROBLEM, REHAB EVAL
Pt is a 60y F pmhx of Ph+ ALL, hx of SDH, chronic arthritic back pain p/w acute on chronic low back pain and profound b/l LE weakness, found with leukocytosis and pancytopenia concerning for relapse ALL, admitted for further evaluation and management. CT Head showing increased soft tissue prominence in the region of the hypothalamus/pituitary stalk - pending MR head for further evaluate.

## 2020-05-13 NOTE — CONSULT NOTE ADULT - ASSESSMENT
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 Patients labs in the ED were concerning for replapse of ALL and was admitted to medicine. Neurologically, complains of significant LBP, no radiculopathy,  5/5 in UE, no hoffmans, 4/5 proximally in LE (L> R, pain limited) 5/5 distally. No  clonus or hyperreflexia.  Tingling numbness is in no particular distribution.  She also complaints of being unsteady while standing and walking.  CT T and L spine with mild degenerative changes no compression.  MRI L spine without any acute pathology.  CTH with JOSE ALBERTO Urban placed at OSH  in 2016. with ? some mild increased prominence in Pituitary/hypothalamus   - No acute neurosurgical intervention   - Given unsteady gait and tingling numbness in UE/LE would get MRI C spine to rule out any cervical pathology   - Given some non specific findings in CTH would recommend an MRI wwo C to assess Pituitary/hypothalamus

## 2020-05-13 NOTE — PROGRESS NOTE ADULT - ASSESSMENT
60y F pmhx of Ph+ ALL, hx of SDH, chronic arthritic back pain p/w acute on chronic low back pain and profound b/l LE weakness, found with leukocytosis and pancytopenia concerning for relapse ALL, admitted for further evaluation and management.

## 2020-05-13 NOTE — PROGRESS NOTE ADULT - SUBJECTIVE AND OBJECTIVE BOX
PROGRESS NOTE:   Authored by Dr. Zuleyka De Anda MD Pager [phone] [facility], [id number] [facility]     Patient is a 60y old  Female who presents with a chief complaint of numbness and weakness (13 May 2020 02:17)      SUBJECTIVE / OVERNIGHT EVENTS: No acute events overnight.     ADDITIONAL REVIEW OF SYSTEMS:    MEDICATIONS  (STANDING):  allopurinol 300 milliGRAM(s) Oral daily  cefTRIAXone   IVPB 1000 milliGRAM(s) IV Intermittent every 24 hours  levETIRAcetam 500 milliGRAM(s) Oral two times a day  lidocaine   Patch 1 Patch Transdermal every 24 hours  pantoprazole    Tablet 40 milliGRAM(s) Oral before breakfast  sodium chloride 0.9%. 500 milliLiter(s) (75 mL/Hr) IV Continuous <Continuous>  ursodiol Capsule 300 milliGRAM(s) Oral every 12 hours    MEDICATIONS  (PRN):  morphine  - Injectable 4 milliGRAM(s) IV Push every 6 hours PRN Severe Pain (7 - 10)  oxyCODONE    IR 5 milliGRAM(s) Oral every 6 hours PRN Moderate Pain (4 - 6)      CAPILLARY BLOOD GLUCOSE        I&O's Summary      PHYSICAL EXAM:  Vital Signs Last 24 Hrs  T(C): 36.9 (13 May 2020 00:58), Max: 36.9 (13 May 2020 00:35)  T(F): 98.4 (13 May 2020 00:58), Max: 98.5 (13 May 2020 00:35)  HR: 76 (13 May 2020 00:58) (75 - 86)  BP: 107/66 (13 May 2020 00:58) (107/66 - 134/79)  BP(mean): --  RR: 18 (13 May 2020 00:58) (16 - 18)  SpO2: 100% (13 May 2020 00:58) (99% - 100%)    GENERAL: NAD, morbidly obese   	HEAD:  Atraumatic, Normocephalic  	EYES: EOMI, conjunctiva and sclera clear  	NECK: Supple, No JVD  	CHEST/LUNG: Clear to auscultation bilaterally; No wheeze  	HEART: Regular rate and rhythm; Normal s1s2   	ABDOMEN: Soft, Nontender, distended, + bowel sounds   	BACK: tender to palpation along cervical and thoracic spine with no overt paraspinal muscle tenderness, appears to have lordosis   	EXTREMITIES:, No clubbing, cyanosis, or edema  	PSYCH: AAOx3  	NEUROLOGY: sensation in tact b/l, 3/5 strength on the rle and 2/5 strength LLE, 5/5 strength b/l UPE   SKIN: scattered ecchymoses    LABS:                        6.1    78.89 )-----------( 13       ( 12 May 2020 21:30 )             19.7     05-12    133<L>  |  101  |  28<H>  ----------------------------<  109<H>  5.4<H>   |  20<L>  |  1.02    Ca    10.1      12 May 2020 18:32    TPro  7.2  /  Alb  3.7  /  TBili  0.3  /  DBili  x   /  AST  28  /  ALT  27  /  AlkPhos  78  05-12    PT/INR - ( 12 May 2020 18:32 )   PT: 10.1 sec;   INR: 0.88 ratio         PTT - ( 12 May 2020 18:32 )  PTT:16.6 sec      Urinalysis Basic - ( 12 May 2020 21:12 )    Color: Yellow / Appearance: Clear / S.042 / pH: x  Gluc: x / Ketone: Negative  / Bili: Negative / Urobili: Negative   Blood: x / Protein: Trace / Nitrite: Positive   Leuk Esterase: Large / RBC: 1 /hpf / WBC 94 /HPF   Sq Epi: x / Non Sq Epi: 1 /hpf / Bacteria: Many    CT Lumbar Spine Reform No Cont:    	CT chest 2016. MR lumbar spine 2018. CT abdomen/pelvis 2020.    	THORACIC SPINE:    	No acute fracture or dislocation. Normal facet joint alignment. The vertebral body heights and intervertebral disc spaces are maintained. Multilevel right-sided bridging osteophytes are noted throughout spanning through the T5-T10 vertebral bodies. No spinal canal stenosis.    	0.7 cm groundglass nodule in the left upper lobe (2, 52), new since 2016.    	The remainder of the visualized chest is unremarkable.    	LUMBAR FINDINGS:    	No acute fracture or dislocation. Normal facet joint alignment. The vertebral body heights and disc spaces are maintained. Multilevel facet joint arthropathy, most predominantly at L5-S1, isnoted.. No spinal canal stenosis. 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 There is a transitional type SI vertebral body.    	Please refer to the dedicated report for CT abdomen/pelvis for evaluation of the intra-abdominal structures.    	IMPRESSION:    	No acute spinal pathology. Degenerative changes in the thoracic and lumbar spine as described above.        Groundglass nodule measuring 0.7 cm is noted in the left upper lobe. Follow-up noncontrast CT in 6 months is recommended to establish stability/resolution.      RADIOLOGY & ADDITIONAL TESTS:  Results Reviewed:   Imaging Personally Reviewed:  Electrocardiogram Personally Reviewed:    COORDINATION OF CARE:  Care Discussed with Consultants/Other Providers [Y/N]:  Prior or Outpatient Records Reviewed [Y/N]: PROGRESS NOTE:   Authored by Dr. Zuleyka De Anda MD Pager [phone] [facility], [id number] [facility]     Patient is a 60y old  Female who presents with a chief complaint of numbness and weakness (13 May 2020 02:17)      SUBJECTIVE / OVERNIGHT EVENTS: No acute events overnight. This am pt complains of back pain and weakness.     ADDITIONAL REVIEW OF SYSTEMS: No urinary or fecal incontinence, fevers, chills, N/V/D, chest pain, abdominal pain     MEDICATIONS  (STANDING):  allopurinol 300 milliGRAM(s) Oral daily  cefTRIAXone   IVPB 1000 milliGRAM(s) IV Intermittent every 24 hours  hydroxyurea 1000 milliGRAM(s) Oral two times a day  imatinib 400 milliGRAM(s) Oral two times a day  levETIRAcetam 500 milliGRAM(s) Oral two times a day  lidocaine   Patch 1 Patch Transdermal every 24 hours  pantoprazole    Tablet 40 milliGRAM(s) Oral before breakfast  polyethylene glycol 3350 17 Gram(s) Oral daily  senna 2 Tablet(s) Oral at bedtime  sodium chloride 0.9%. 500 milliLiter(s) (75 mL/Hr) IV Continuous <Continuous>  ursodiol Capsule 300 milliGRAM(s) Oral every 12 hours    MEDICATIONS  (PRN):  HYDROmorphone  Injectable 1 milliGRAM(s) IV Push every 6 hours PRN Severe Pain (7 - 10)  oxyCODONE    IR 5 milliGRAM(s) Oral every 6 hours PRN Moderate Pain (4 - 6)      CAPILLARY BLOOD GLUCOSE        I&O's Summary      PHYSICAL EXAM:  Vital Signs Last 24 Hrs  T(C): 36.9 (13 May 2020 00:58), Max: 36.9 (13 May 2020 00:35)  T(F): 98.4 (13 May 2020 00:58), Max: 98.5 (13 May 2020 00:35)  HR: 76 (13 May 2020 00:58) (75 - 86)  BP: 107/66 (13 May 2020 00:58) (107/66 - 134/79)  BP(mean): --  RR: 18 (13 May 2020 00:58) (16 - 18)  SpO2: 100% (13 May 2020 00:58) (99% - 100%)    GENERAL: NAD, morbidly obese   	HEAD:  Atraumatic, Normocephalic  	EYES: EOMI, conjunctiva and sclera clear  	NECK: Supple, No JVD  	CHEST/LUNG: Clear to auscultation bilaterally; No wheeze  	HEART: Regular rate and rhythm; Normal s1s2   	ABDOMEN: Soft, Nontender, distended, + bowel sounds   	BACK: tender to palpation along cervical and thoracic spine with no overt paraspinal muscle tenderness, appears to have lordosis   	EXTREMITIES:, No clubbing, cyanosis, or edema  	PSYCH: AAOx3  	NEUROLOGY: sensation in tact b/l, 3/5 strength on the rle and 3/5 strength LLE, 5/5 strength b/l UPE   SKIN: scattered ecchymoses    LABS:                                    x      x     )-----------( 37       ( 13 May 2020 10:30 )             x        05-    137  |  103  |  27<H>  ----------------------------<  144<H>  4.3   |  22  |  1.04    Ca    9.6      13 May 2020 10:29  Phos  4.3       Mg     2.2         TPro  6.6  /  Alb  3.5  /  TBili  0.2  /  DBili  x   /  AST  24  /  ALT  24  /  AlkPhos  71  05-    PT/INR - ( 12 May 2020 18:32 )   PT: 10.1 sec;   INR: 0.88 ratio         PTT - ( 12 May 2020 18:32 )  PTT:16.6 sec      Urinalysis Basic - ( 12 May 2020 21:12 )    Color: Yellow / Appearance: Clear / S.042 / pH: x  Gluc: x / Ketone: Negative  / Bili: Negative / Urobili: Negative   Blood: x / Protein: Trace / Nitrite: Positive   Leuk Esterase: Large / RBC: 1 /hpf / WBC 94 /HPF   Sq Epi: x / Non Sq Epi: 1 /hpf / Bacteria: Many    CT Lumbar Spine Reform No Cont:    	CT chest 2016. MR lumbar spine 2018. CT abdomen/pelvis 2020.    	THORACIC SPINE:    	No acute fracture or dislocation. Normal facet joint alignment. The vertebral body heights and intervertebral disc spaces are maintained. Multilevel right-sided bridging osteophytes are noted throughout spanning through the T5-T10 vertebral bodies. No spinal canal stenosis.    	0.7 cm groundglass nodule in the left upper lobe (2, 52), new since 2016.    	The remainder of the visualized chest is unremarkable.    	LUMBAR FINDINGS:    	No acute fracture or dislocation. Normal facet joint alignment. The vertebral body heights and disc spaces are maintained. Multilevel facet joint arthropathy, most predominantly at L5-S1, isnoted.. No spinal canal stenosis. 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 There is a transitional type SI vertebral body.    	Please refer to the dedicated report for CT abdomen/pelvis for evaluation of the intra-abdominal structures.    	IMPRESSION:    	No acute spinal pathology. Degenerative changes in the thoracic and lumbar spine as described above.        Groundglass nodule measuring 0.7 cm is noted in the left upper lobe. Follow-up noncontrast CT in 6 months is recommended to establish stability/resolution.    < from: MR Lumbar Spine w/wo IV Cont (20 @ 22:50) >  IMPRESSION: Enhancing T2 prolongation involving the conus ends and enhancement involving the cauda equina as well.    Degenerative changes as described     < end of copied text >      RADIOLOGY & ADDITIONAL TESTS:  Results Reviewed:   Imaging Personally Reviewed:  Electrocardiogram Personally Reviewed:    COORDINATION OF CARE:  Care Discussed with Consultants/Other Providers [Y/N]:  Prior or Outpatient Records Reviewed [Y/N]:

## 2020-05-13 NOTE — PROGRESS NOTE ADULT - PROBLEM SELECTOR PLAN 5
Patient with no saddle anesthesia, no cord compression noted   NSX following, awaiting MRI as above    PT eval severe weakness    CT Head showing increased soft tissue prominence in the region of the hypothalamus/pituitary stalk - pending MR head for further evaluate

## 2020-05-13 NOTE — PROGRESS NOTE ADULT - PROBLEM SELECTOR PLAN 3
Patient with acute on chronic back pain, recently with normal MR spine   CT w multilevel facet joint arthropathy and degenerative joint disease in thoracic and lumbar spine   MR lumbar with no evidence of cord compression (official read pending)   Will need to assess cervical and thoracic given new findings of numbness/ tingling with gait instability with MRI oer NSX - f/u MRI cervical/thoracic spine   pain control with lido patch; tylenol or oxycodone 5mg PO or morphine 4mg IVP as needed Patient with acute on chronic back pain, recently with normal MR spine   CT w multilevel facet joint arthropathy and degenerative joint disease in thoracic and lumbar spine   MR lumbar with enhancing T2 prolongation involving the conus ends and enhancement involving the cauda equina as well.  Will need to assess cervical and thoracic given new findings of numbness/ tingling with gait instability with MRI oer NSX - f/u MRI cervical/thoracic spine   pain control with lido patch; tylenol or oxycodone 5mg PO or dilaudid 1 mg IV q6 PRN

## 2020-05-13 NOTE — CONSULT NOTE ADULT - SUBJECTIVE AND OBJECTIVE BOX
p (1480)     HPI:  60y F pmhx of Ph+ ALL, hx of SDH, chronic arthritic back pain p/w low back pain and leg weakness. Pt sent to ED by her hematologist office, Dr. Bentley where she had an appointment today for routine blood work. Patient was profoundly week and could not even get out of her car, was referred by Dr Jacobo to the hospital for further evaluation. Additionally history attained from patients emergency contact Germaine her daughter in law. Patient had missed her routine appointment with hematology last week. Has been experiencing acute on chronic severe back pain . Initially had abck pain in  however given the increased severity of her symptoms she visited an orthopedist 3 weeks about with MRI attained of lumbrosacral area. MRI reportedly only demonstrating arthritis. Patients LE weakness has become so severe where last week she was able to stand, now this week she is falling to he knees . Was given a walker for mothers day to assist with ambulation given the increased difficulty. Patient has experienced night sweats, but denies fever. Had reported to have been in remission following BM transplant in 2019 from her son. while her BM was in remission  however PCR was positive as of last note written in april  Patient lives alone with family near by.  Patient has had hx of falls, last fall 2 weeks ago, no head trauma, no LOC. Patient states since then she has experienced new b/l hand tingles and numbness and Le numbness and tingles with sensation in tact. Since then she has become more unstable. Admits to chronic leg pain/stiffness that has worsened due to lack of activity. Pt denies IVDA, falls/trauma, dizziness, HA, syncope, change in vision, chest pain, SOB, cough, abdominal pain, n/v/d, dysuria, hematuria, change in stools, urinary or fecal retention/incontinence, or saddle anesthesia prior episodes. She denies sick contact. Patient had been given last dose of inotuzamab in oct/ 2019 per outpatient records.     VS in the ED /79, HR 75, RR 16, T 98.3 F, saturating 100% RA (12 May 2020 22:00)      Imaging:  No acute pathologies in T and L spine CT   MRI L spine f/u final read - no acute pathologies    Exam:  Exam:  AOx3, Following Commands  Motor:          Upper extremity                       Delt      Bicep     Tricep     HG                                                 L         5/5        5/5          5/5        5/5                                               R          5/5       5/5          5/5        5/5          Lower extremity                           HF          KF         KE         DF        PF                                                  L           4/5         4+/5        5/5       5/5        5/5                                               R           4-/5         4+/5        5/5       5/5        5/5  Sensation / Reflexes  [x] intact to light touch  [ ] decreased:   No clonus    --Anticoagulation:    =====================  PAST MEDICAL HISTORY   Herpes zoster  Leukemia  Clostridium difficile diarrhea  Intractable migraine with status migrainosus, unspecified migraine type  Sciatica of right side  Chronic gastritis, presence of bleeding unspecified, unspecified gastritis type  Essential hypertension    PAST SURGICAL HISTORY   Lipoma  Elective surgical procedure  History of  delivery    shellfish (Nausea; Vomiting)      MEDICATIONS:  Antibiotics:  cefTRIAXone   IVPB 1000 milliGRAM(s) IV Intermittent every 24 hours    Neuro:  oxyCODONE    IR 5 milliGRAM(s) Oral every 6 hours PRN  oxyCODONE    IR 10 milliGRAM(s) Oral daily PRN    Other:  allopurinol 300 milliGRAM(s) Oral daily  sodium chloride 0.9%. 500 milliLiter(s) IV Continuous <Continuous>      SOCIAL HISTORY:   Occupation:   Marital Status:     FAMILY HISTORY:  No pertinent family history in first degree relatives      ROS: Negative except per HPI    LABS:  PT/INR - ( 12 May 2020 18:32 )   PT: 10.1 sec;   INR: 0.88 ratio         PTT - ( 12 May 2020 18:32 )  PTT:16.6 sec                        6.1    78.89 )-----------( 13       ( 12 May 2020 21:30 )             19.7         133<L>  |  101  |  28<H>  ----------------------------<  109<H>  5.4<H>   |  20<L>  |  1.02    Ca    10.1      12 May 2020 18:32    TPro  7.2  /  Alb  3.7  /  TBili  0.3  /  DBili  x   /  AST  28  /  ALT  27  /  AlkPhos  78  -12

## 2020-05-13 NOTE — PROGRESS NOTE ADULT - PROBLEM SELECTOR PLAN 7
CT Thoracic Spine No Cont: Groundglass nodule measuring 0.7 cm is noted in the left upper lobe. Follow-up noncontrast CT in 6 months is recommended  -To be followed up outpatient with repeat imaging

## 2020-05-13 NOTE — CONSULT NOTE ADULT - SUBJECTIVE AND OBJECTIVE BOX
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 Pt sent to ED by her hematologist office, Dr. Bentley where she had an appointment today for routine blood work. While trying to get to her appt, pt was profoundly weak and could not even get out of her car,  adn was referred by Dr Bentley to the hospital for further evaluation. Additional history obtained from patients emergency contact Germaine (daughter in law). Patient had missed her routine appointment with hematology last week. Has been experiencing acute on chronic severe back pain. Has been having ongoing back pain since , however given the increased severity of her symptoms she visited an orthopedist 3 weeks ago and had MRI of lumbosacral area, which reportedly only demonstrated arthritis. Patients LE weakness has progressed to the point where she was only able to stand last week, and this week she is unable to do even that, falling to her knees. Was given a walker for mothers day to assist with ambulation given the increased difficulty. Patient has experienced night sweats, but denies fever.     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 FISH for donor XY was 100% in 2020.          PAST MEDICAL & SURGICAL HISTORY:  Herpes zoster  Leukemia  Clostridium difficile diarrhea  Intractable migraine with status migrainosus, unspecified migraine type  Sciatica of right side  Chronic gastritis, presence of bleeding unspecified, unspecified gastritis type  Essential hypertension  Lipoma: left side  Elective surgical procedure: ommaya placement  History of  delivery      Allergies  No Known Drug Allergies  shellfish (Nausea; Vomiting)        MEDICATIONS  (STANDING):  allopurinol 300 milliGRAM(s) Oral daily  cefTRIAXone   IVPB 1000 milliGRAM(s) IV Intermittent every 24 hours  levETIRAcetam 500 milliGRAM(s) Oral two times a day  lidocaine   Patch 1 Patch Transdermal every 24 hours  pantoprazole    Tablet 40 milliGRAM(s) Oral before breakfast  sodium chloride 0.9%. 500 milliLiter(s) (75 mL/Hr) IV Continuous <Continuous>  ursodiol Capsule 300 milliGRAM(s) Oral every 12 hours    MEDICATIONS  (PRN):  morphine  - Injectable 4 milliGRAM(s) IV Push every 6 hours PRN Severe Pain (7 - 10)  oxyCODONE    IR 5 milliGRAM(s) Oral every 6 hours PRN Moderate Pain (4 - 6)      FAMILY HISTORY:  No pertinent family history in first degree relatives      SOCIAL HISTORY: No EtOH, no tobacco    REVIEW OF SYSTEMS:    CONSTITUTIONAL: No weakness, fevers or chills  EYES/ENT: No visual changes;  No vertigo or throat pain   NECK: No pain or stiffness  RESPIRATORY: No cough, wheezing, hemoptysis; No shortness of breath  CARDIOVASCULAR: No chest pain or palpitations  GASTROINTESTINAL: No abdominal or epigastric pain. No nausea, vomiting, or hematemesis; No diarrhea or constipation. No melena or hematochezia.  GENITOURINARY: No dysuria, frequency or hematuria  NEUROLOGICAL: No numbness or weakness  SKIN: No itching, burning, rashes, or lesions   All other review of systems is negative unless indicated above.    Height (cm): 160.02 ( @ 15:31)  Weight (kg): 104.3 (05-12 @ 15:)  BMI (kg/m2): 40.7 (05-12 @ 15:31)  BSA (m2): 2.05 ( 15:31)    T(F): 98.4 (20 @ 08:05), Max: 98.5 (20 @ 00:35)  HR: 74 (20 @ 08:05)  BP: 112/73 (20 @ 08:05)  RR: 18 (20 @ 08:05)  SpO2: 100% (20 @ 08:05)  Wt(kg): --    GENERAL: NAD, well-developed  HEAD:  Atraumatic, Normocephalic  EYES: EOMI, PERRLA, conjunctiva and sclera clear  NECK: Supple, No JVD  CHEST/LUNG: Clear to auscultation bilaterally; No wheeze  HEART: Regular rate and rhythm; No murmurs, rubs, or gallops  ABDOMEN: Soft, Nontender, Nondistended; Bowel sounds present  EXTREMITIES:  2+ Peripheral Pulses, No clubbing, cyanosis, or edema  NEUROLOGY: non-focal  SKIN: No rashes or lesions                          6.1    78.89 )-----------( 13       ( 12 May 2020 21:30 )             19.7           133<L>  |  101  |  28<H>  ----------------------------<  109<H>  5.4<H>   |  20<L>  |  1.02    Ca    10.1      12 May 2020 18:32    TPro  7.2  /  Alb  3.7  /  TBili  0.3  /  DBili  x   /  AST  28  /  ALT  27  /  AlkPhos  78  0512      Lactate Dehydrogenase, Serum: 214 U/L ( @ 21:30)  Uric Acid, Serum: 8.4 mg/dL ( @ 21:30)      PT/INR - ( 12 May 2020 18:32 )   PT: 10.1 sec;   INR: 0.88 ratio         PTT - ( 12 May 2020 18:32 )  PTT:16.6 sec HPI:  60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 Pt sent to ED by her hematologist office, Dr. Bentley where she had an appointment today for routine blood work. While trying to get to her appt, pt was profoundly weak and could not even get out of her car,  adn was referred by Dr Bentley to the hospital for further evaluation. Additional history obtained from patients emergency contact Germaine (daughter in law). Patient had missed her routine appointment with hematology last week. Has been experiencing acute on chronic severe back pain. Has been having ongoing back pain since , however given the increased severity of her symptoms she visited an orthopedist 3 weeks ago and had MRI of lumbosacral area, which reportedly only demonstrated arthritis. Patients LE weakness has progressed to the point where she was only able to stand last week, and this week she is unable to do even that, falling to her knees. Was given a walker for mothers day to assist with ambulation given the increased difficulty. Patient has experienced night sweats, but denies fever.     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 FISH for donor XY was 100% in 2020.          PAST MEDICAL & SURGICAL HISTORY:  Herpes zoster  Leukemia  Clostridium difficile diarrhea  Intractable migraine with status migrainosus, unspecified migraine type  Sciatica of right side  Chronic gastritis, presence of bleeding unspecified, unspecified gastritis type  Essential hypertension  Lipoma: left side  Elective surgical procedure: ommaya placement  History of  delivery      Allergies  No Known Drug Allergies  shellfish (Nausea; Vomiting)        MEDICATIONS  (STANDING):  allopurinol 300 milliGRAM(s) Oral daily  cefTRIAXone   IVPB 1000 milliGRAM(s) IV Intermittent every 24 hours  levETIRAcetam 500 milliGRAM(s) Oral two times a day  lidocaine   Patch 1 Patch Transdermal every 24 hours  pantoprazole    Tablet 40 milliGRAM(s) Oral before breakfast  sodium chloride 0.9%. 500 milliLiter(s) (75 mL/Hr) IV Continuous <Continuous>  ursodiol Capsule 300 milliGRAM(s) Oral every 12 hours    MEDICATIONS  (PRN):  morphine  - Injectable 4 milliGRAM(s) IV Push every 6 hours PRN Severe Pain (7 - 10)  oxyCODONE    IR 5 milliGRAM(s) Oral every 6 hours PRN Moderate Pain (4 - 6)      FAMILY HISTORY:  No pertinent family history in first degree relatives      SOCIAL HISTORY: No EtOH, no tobacco      REVIEW OF SYSTEMS:  CONSTITUTIONAL: +WEAKNESS  EYES/ENT: No visual changes;  No vertigo or throat pain   NECK: No pain or stiffness  RESPIRATORY: No cough, wheezing, hemoptysis; No shortness of breath  CARDIOVASCULAR: No chest pain or palpitations  GASTROINTESTINAL: No abdominal or epigastric pain. No nausea, vomiting, or hematemesis; No diarrhea or constipation. No melena or hematochezia.  GENITOURINARY: No dysuria, frequency or hematuria  NEUROLOGICAL: No numbness or weakness  SKIN: No itching, burning, rashes, or lesions   All other review of systems is negative unless indicated above.      Height (cm): 160.02 ( @ 15:31)  Weight (kg): 104.3 ( 15:31)  BMI (kg/m2): 40.7 ( 15:31)  BSA (m2): 2.05 ( @ 15:31)      T(F): 98.4 (20 @ 08:05), Max: 98.5 (20 @ 00:35)  HR: 74 (20 @ 08:05)  BP: 112/73 (20 @ 08:05)  RR: 18 (20 @ 08:05)  SpO2: 100% (20 @ 08:05)      GENERAL: NAD, well-developed  HEAD:  Atraumatic, Normocephalic  EYES: EOMI, conjunctiva and sclera clear  NECK: Supple  CHEST/LUNG: normal respiratory effort   HEART: deferred  ABDOMEN: non-tender  EXTREMITIES: No clubbing, cyanosis, or edema  NEUROLOGY: moving upper extremities, difficult to move lower extremities due to pain   SKIN: No rashes or lesions                          6.1    78.89 )-----------( 13       ( 12 May 2020 21:30 )             19.7           133<L>  |  101  |  28<H>  ----------------------------<  109<H>  5.4<H>   |  20<L>  |  1.02    Ca    10.1      12 May 2020 18:32    TPro  7.2  /  Alb  3.7  /  TBili  0.3  /  DBili  x   /  AST  28  /  ALT  27  /  AlkPhos  78  -      Lactate Dehydrogenase, Serum: 214 U/L ( @ 21:30)  Uric Acid, Serum: 8.4 mg/dL ( @ 21:30)      PT/INR - ( 12 May 2020 18:32 )   PT: 10.1 sec;   INR: 0.88 ratio         PTT - ( 12 May 2020 18:32 )  PTT:16.6 sec      RADIOLOGY:  EXAM:  MR SPINE LUMBAR WAW IC                        PROCEDURE DATE:  2020      INTERPRETATION:  Clinical indication: History of leukemia. Lower extremity weakness. Back pain.    MRI of the lumbar spine was performed using sagittal T1-T2 and T2-weighted sequence fat suppression. Axial T1 and T2-weighted sequences were performed.    The vertebral body height and alignment appear normal.    There is decreased signal seen involving the visualized vertebral bodies. This could be secondary to underlying marrow infiltrative process though the possibility of underlying severe anemia must be considered as well. Please correlate clinically.    Disc desiccation is seen involving the L2-3 level which is secondary to degenerative changes.    Enhancing area of abnormal T2 prolongation is seen involving the visualized portion of the lower spinal cord down to the conus. Abnormal enhancement involving the cauda equina is seen as well. This could be compatible patient's known leukemia though underlying infectious or inflammatory. Clinical correlation is recommended. Dedicated imaging thoracic spine is recommended for further evaluation.     T10-11: Normal    T11-12: Normal    T12-L1: Normal    L1-2: Normal    L2-3: Bilateral hypertrophic facet joint changes are seen. Mild narrowing of the spinal canal.    L3-4: Bilateral hypertrophic facet joint changes are seen. No significant compromise of the spinal canal or either neural foramen    L4-5: Disc bulge and bilateral hypertrophic facet joint changes are seen. No significant compromise of the spinal canal or either neural foramen.    L5-S1: Disc bulge is seen. No significant compromise of the spinal canal or either neural foramen.    The conus ends at L1.    Transitional vertebrae is seen at the L5 level.    IMPRESSION: Enhancing T2 prolongation involving the conus ends and enhancement involving the cauda equina as well.    Degenerative changes as described       ISIS BRUCE M.D., ATTENDING RADIOLOGIST  This document has been electronically signed. May 13 2020  8:29AM

## 2020-05-13 NOTE — PHYSICAL THERAPY INITIAL EVALUATION ADULT - PATIENT/FAMILY AGREES WITH PLAN
yes/Subacute Rehab; Pt with limitations in self-care & home management, will benefit from Sub acute rehab prior to D/C home to increase strength, balance and endurance to improve functional mobility to level necessary for safe return home

## 2020-05-13 NOTE — PROGRESS NOTE ADULT - PROBLEM SELECTOR PLAN 8
DVT PPX: will hold in the setting of severe thrombocytopenia   DIET: regular, dysphagia screen at bedside   Dispo: tentative, Sw consult, fall precautions

## 2020-05-13 NOTE — PHYSICAL THERAPY INITIAL EVALUATION ADULT - PRECAUTIONS/LIMITATIONS, REHAB EVAL
CT T and L spine with mild degenerative changes no compression.  MRI L spine without any acute pathology.

## 2020-05-13 NOTE — CONSULT NOTE ADULT - ATTENDING COMMENTS
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  She has presented to the hospital after one month at home with increasing lack of mobility; staying in bed and frequent falls on arising to stand. She has not been on imatinib therapy since December 2018.  CBC findings in the ER show white cell elevation and leukemia blasts, anemia and a platelet count of 13 000. Lumbar sacral MRI shows enhancement in the cauda equina. She is able to lift her legs off the bed but she is not able to stand. She has numerous ecchymosis.  She will be restarted on imatinib therapy 400 mg PO BID and hydroxyurea 2 grams PO daily. The WBC is 78 000; and we request complete spine imaging with MRI is requested

## 2020-05-13 NOTE — PROGRESS NOTE ADULT - PROBLEM SELECTOR PLAN 6
U/A positive, bacteria in urine, + LE   + frequency, no hesitancy or dysuria   C/W ceftriaxone 1 gram q24 hours

## 2020-05-13 NOTE — PROGRESS NOTE ADULT - PROBLEM SELECTOR PLAN 9
Transitions of Care Status:  1.  Name of PCP:Dr. Bentley   2.  PCP Contacted on Admission: [ ] Y    [ ] N    3.  PCP contacted at Discharge: [ ] Y    [ ] N    [ ] N/A  4.  Post-Discharge Appointment Date and Location:  5.  Summary of Handoff given to PCP:

## 2020-05-13 NOTE — PROGRESS NOTE ADULT - PROBLEM SELECTOR PLAN 1
WBC of ~80k (last 9.2 in 2/2020) likely 2/2 relapsed ALL, 65% blasts on CBC   Heme recs appreciated -  s/p hydroxyurea 2 grams stat, will start allopurinol 300mg PO daily   Spoke to heme fellow will hold mepron, valganciclovir for now  Currently patient afebrile - will c/w ceftriaxone for UTI, and monitor fever curve; if febrile will  broaden to cefepime 2 grams q8 and obtain blood cultures  Bone marrow Transplant/ hematology consulted and will continue to monitor  S/p 1 unit of PRBC and 1 unit of platelets   Maintain active type and screen WBC of ~80k (last 9.2 in 2/2020) likely 2/2 relapsed ALL, 65% blasts on CBC   Heme recs appreciated -  s/p hydroxyurea 2 grams stat, will start allopurinol 300mg PO daily   Spoke to heme fellow will hold mepron, valganciclovir for now  Currently patient afebrile - will c/w ceftriaxone for UTI, and monitor fever curve; if febrile will broaden to cefepime 2 grams q8 and obtain blood cultures  Bone marrow Transplant/ hematology consulted and will continue to monitor  S/p 1 unit of PRBC and 1 unit of platelets   Maintain active type and screen  - Will restart Gleevec 400mg BID per Dr. Bentley  - F/u FISH for Y, peripheral flow cytometry, and BCR-ABL  - continue IVF  - check CBC with DIFF, CMP, LDH, uric acid daily   - transfuse for hgb<7 and/or platelets <10K

## 2020-05-13 NOTE — CONSULT NOTE ADULT - ASSESSMENT
60F w/ Ph+ ALL s/p R-HyperCVAD x 4 cycles with IT MTX x 2, s/p Masha AutoSCT on 12/16/16, then with relapsed Ph positive ALL s/p re-induction with inotuzimab in CR, s/p haploidentical PBSCT on 2/13/19 (son), sent in from [facility] for weakness and back pain.  Now with likely relapsed ALL.     # Ph+ ALL, likely relapsed:   - History: as above, s/p autoSCT in 2016, then relapsed, s/p haploPBSCT (son) in Feb 2019  - labs on admission showing leukocytosis with 65% peripheral blasts, anemia and thrombocytopenia   - s/p hydrea 2g x 1    - continue Hydrea 1g BID  - check FISH for Y, peripheral flow cytometry, and BCR-ABL: paper requisition and forms given to primary RN, phlebotomy to draw  - continue allopurinol 300mg daily   - continue IVF  - check CBC with DIFF, CMP, LDH, uric acid DAILY       Tanisha Garcia MD  Hematology/Oncology Fellow, PGY-5  pager: [phone]  After 5pm or on weekends, please page the on-call fellow. 60F w/ Ph+ ALL s/p R-HyperCVAD x 4 cycles with IT MTX x 2, s/p Masha AutoSCT on 12/16/16, then with relapsed Ph positive ALL s/p re-induction with inotuzimab in CR, s/p haploidentical PBSCT on 2/13/19 (son), sent in from [facility] for weakness and back pain.  Now with likely relapsed ALL.     # Ph+ ALL, likely relapsed:   - History: as above, s/p autoSCT in 2016, then relapsed, s/p haploPBSCT (son) in Feb 2019  - previously on Gleevec after second transplant, stopped Dec 2018?  - labs on admission showing leukocytosis with 65% peripheral blasts, anemia and thrombocytopenia   - s/p hydrea 2g x 1    - continue Hydrea 1g BID  - will restart Gleevec 400mg BID per Dr. Bentley  - check FISH for Y, peripheral flow cytometry, and BCR-ABL: paper requisition and forms given to primary RN, phlebotomy to draw  - continue allopurinol 300mg daily   - continue IVF  - check CBC with DIFF, CMP, LDH, uric acid DAILY   - transfused for hgb<7 and/or platelets <10K      Tanisha Garcia MD  Hematology/Oncology Fellow, PGY-5  pager: [phone]  After 5pm or on weekends, please page the on-call fellow.

## 2020-05-14 LAB
-  AMIKACIN: SIGNIFICANT CHANGE UP
-  AMPICILLIN/SULBACTAM: SIGNIFICANT CHANGE UP
-  AMPICILLIN: SIGNIFICANT CHANGE UP
-  AZTREONAM: SIGNIFICANT CHANGE UP
-  CEFAZOLIN: SIGNIFICANT CHANGE UP
-  CEFEPIME: SIGNIFICANT CHANGE UP
-  CEFOXITIN: SIGNIFICANT CHANGE UP
-  CEFTRIAXONE: SIGNIFICANT CHANGE UP
-  CIPROFLOXACIN: SIGNIFICANT CHANGE UP
-  GENTAMICIN: SIGNIFICANT CHANGE UP
-  IMIPENEM: SIGNIFICANT CHANGE UP
-  LEVOFLOXACIN: SIGNIFICANT CHANGE UP
-  MEROPENEM: SIGNIFICANT CHANGE UP
-  NITROFURANTOIN: SIGNIFICANT CHANGE UP
-  PIPERACILLIN/TAZOBACTAM: SIGNIFICANT CHANGE UP
-  TIGECYCLINE: SIGNIFICANT CHANGE UP
-  TOBRAMYCIN: SIGNIFICANT CHANGE UP
-  TRIMETHOPRIM/SULFAMETHOXAZOLE: SIGNIFICANT CHANGE UP
ALBUMIN SERPL ELPH-MCNC: 3.3 G/DL — SIGNIFICANT CHANGE UP (ref 3.3–5)
ALP SERPL-CCNC: 71 U/L — SIGNIFICANT CHANGE UP (ref 40–120)
ALT FLD-CCNC: 23 U/L — SIGNIFICANT CHANGE UP (ref 10–45)
ANION GAP SERPL CALC-SCNC: 14 MMOL/L — SIGNIFICANT CHANGE UP (ref 5–17)
APTT BLD: 25.4 SEC — LOW (ref 27.5–36.3)
AST SERPL-CCNC: 22 U/L — SIGNIFICANT CHANGE UP (ref 10–40)
BASOPHILS # BLD AUTO: 0 K/UL — SIGNIFICANT CHANGE UP (ref 0–0.2)
BASOPHILS NFR BLD AUTO: 0 % — SIGNIFICANT CHANGE UP (ref 0–2)
BILIRUB SERPL-MCNC: 0.2 MG/DL — SIGNIFICANT CHANGE UP (ref 0.2–1.2)
BLASTS # FLD: 15.8 % — HIGH (ref 0–0)
BUN SERPL-MCNC: 23 MG/DL — SIGNIFICANT CHANGE UP (ref 7–23)
CALCIUM SERPL-MCNC: 9.6 MG/DL — SIGNIFICANT CHANGE UP (ref 8.4–10.5)
CHLORIDE SERPL-SCNC: 100 MMOL/L — SIGNIFICANT CHANGE UP (ref 96–108)
CO2 SERPL-SCNC: 21 MMOL/L — LOW (ref 22–31)
CREAT SERPL-MCNC: 0.97 MG/DL — SIGNIFICANT CHANGE UP (ref 0.5–1.3)
CULTURE RESULTS: SIGNIFICANT CHANGE UP
EOSINOPHIL # BLD AUTO: 0 K/UL — SIGNIFICANT CHANGE UP (ref 0–0.5)
EOSINOPHIL NFR BLD AUTO: 0 % — SIGNIFICANT CHANGE UP (ref 0–6)
FIBRINOGEN PPP-MCNC: 345 MG/DL — LOW (ref 350–510)
GLUCOSE SERPL-MCNC: 163 MG/DL — HIGH (ref 70–99)
HCT VFR BLD CALC: 26 % — LOW (ref 34.5–45)
HGB BLD-MCNC: 8.5 G/DL — LOW (ref 11.5–15.5)
INR BLD: 0.91 RATIO — SIGNIFICANT CHANGE UP (ref 0.88–1.16)
LDH SERPL L TO P-CCNC: 157 U/L — SIGNIFICANT CHANGE UP (ref 50–242)
LYMPHOCYTES # BLD AUTO: 5.54 K/UL — HIGH (ref 1–3.3)
LYMPHOCYTES # BLD AUTO: 75.4 % — HIGH (ref 13–44)
MAGNESIUM SERPL-MCNC: 2 MG/DL — SIGNIFICANT CHANGE UP (ref 1.6–2.6)
MANUAL SMEAR VERIFICATION: SIGNIFICANT CHANGE UP
MCHC RBC-ENTMCNC: 30.8 PG — SIGNIFICANT CHANGE UP (ref 27–34)
MCHC RBC-ENTMCNC: 32.7 GM/DL — SIGNIFICANT CHANGE UP (ref 32–36)
MCV RBC AUTO: 94.2 FL — SIGNIFICANT CHANGE UP (ref 80–100)
METHOD TYPE: SIGNIFICANT CHANGE UP
MONOCYTES # BLD AUTO: 0 K/UL — SIGNIFICANT CHANGE UP (ref 0–0.9)
MONOCYTES NFR BLD AUTO: 0 % — LOW (ref 2–14)
NEUTROPHILS # BLD AUTO: 0.65 K/UL — LOW (ref 1.8–7.4)
NEUTROPHILS NFR BLD AUTO: 8.8 % — LOW (ref 43–77)
ORGANISM # SPEC MICROSCOPIC CNT: SIGNIFICANT CHANGE UP
ORGANISM # SPEC MICROSCOPIC CNT: SIGNIFICANT CHANGE UP
PHOSPHATE SERPL-MCNC: 4.2 MG/DL — SIGNIFICANT CHANGE UP (ref 2.5–4.5)
PLAT MORPH BLD: NORMAL — SIGNIFICANT CHANGE UP
PLATELET # BLD AUTO: 20 K/UL — CRITICAL LOW (ref 150–400)
POTASSIUM SERPL-MCNC: 4.1 MMOL/L — SIGNIFICANT CHANGE UP (ref 3.5–5.3)
POTASSIUM SERPL-SCNC: 4.1 MMOL/L — SIGNIFICANT CHANGE UP (ref 3.5–5.3)
PROT SERPL-MCNC: 6.5 G/DL — SIGNIFICANT CHANGE UP (ref 6–8.3)
PROTHROM AB SERPL-ACNC: 10.4 SEC — SIGNIFICANT CHANGE UP (ref 10–12.9)
RBC # BLD: 2.76 M/UL — LOW (ref 3.8–5.2)
RBC # FLD: 17 % — HIGH (ref 10.3–14.5)
RBC BLD AUTO: SIGNIFICANT CHANGE UP
SMUDGE CELLS # BLD: PRESENT — SIGNIFICANT CHANGE UP
SODIUM SERPL-SCNC: 135 MMOL/L — SIGNIFICANT CHANGE UP (ref 135–145)
SPECIMEN SOURCE: SIGNIFICANT CHANGE UP
TM INTERPRETATION: SIGNIFICANT CHANGE UP
URATE SERPL-MCNC: 5.9 MG/DL — SIGNIFICANT CHANGE UP (ref 2.5–7)
WBC # BLD: 7.35 K/UL — SIGNIFICANT CHANGE UP (ref 3.8–10.5)
WBC # FLD AUTO: 7.35 K/UL — SIGNIFICANT CHANGE UP (ref 3.8–10.5)

## 2020-05-14 PROCEDURE — 70551 MRI BRAIN STEM W/O DYE: CPT | Mod: 26

## 2020-05-14 PROCEDURE — 99233 SBSQ HOSP IP/OBS HIGH 50: CPT | Mod: GC

## 2020-05-14 PROCEDURE — 72141 MRI NECK SPINE W/O DYE: CPT | Mod: 26

## 2020-05-14 RX ORDER — URSODIOL 250 MG/1
0 TABLET, FILM COATED ORAL
Qty: 0 | Refills: 0 | DISCHARGE

## 2020-05-14 RX ORDER — HYDROMORPHONE HYDROCHLORIDE 2 MG/ML
1 INJECTION INTRAMUSCULAR; INTRAVENOUS; SUBCUTANEOUS EVERY 4 HOURS
Refills: 0 | Status: DISCONTINUED | OUTPATIENT
Start: 2020-05-14 | End: 2020-05-16

## 2020-05-14 RX ORDER — OXYCODONE HYDROCHLORIDE 5 MG/1
1 TABLET ORAL
Qty: 0 | Refills: 0 | DISCHARGE

## 2020-05-14 RX ORDER — HYDROMORPHONE HYDROCHLORIDE 2 MG/ML
1 INJECTION INTRAMUSCULAR; INTRAVENOUS; SUBCUTANEOUS ONCE
Refills: 0 | Status: DISCONTINUED | OUTPATIENT
Start: 2020-05-14 | End: 2020-05-14

## 2020-05-14 RX ORDER — LOSARTAN POTASSIUM 100 MG/1
1 TABLET, FILM COATED ORAL
Qty: 0 | Refills: 0 | DISCHARGE

## 2020-05-14 RX ADMIN — HYDROXYUREA 1000 MILLIGRAM(S): 500 CAPSULE ORAL at 06:02

## 2020-05-14 RX ADMIN — Medication 300 MILLIGRAM(S): at 11:41

## 2020-05-14 RX ADMIN — IMATINIB MESYLATE 400 MILLIGRAM(S): 400 TABLET, FILM COATED ORAL at 06:02

## 2020-05-14 RX ADMIN — HYDROMORPHONE HYDROCHLORIDE 1 MILLIGRAM(S): 2 INJECTION INTRAMUSCULAR; INTRAVENOUS; SUBCUTANEOUS at 19:47

## 2020-05-14 RX ADMIN — URSODIOL 300 MILLIGRAM(S): 250 TABLET, FILM COATED ORAL at 06:01

## 2020-05-14 RX ADMIN — IMATINIB MESYLATE 400 MILLIGRAM(S): 400 TABLET, FILM COATED ORAL at 00:01

## 2020-05-14 RX ADMIN — HYDROMORPHONE HYDROCHLORIDE 1 MILLIGRAM(S): 2 INJECTION INTRAMUSCULAR; INTRAVENOUS; SUBCUTANEOUS at 23:00

## 2020-05-14 RX ADMIN — CEFTRIAXONE 100 MILLIGRAM(S): 500 INJECTION, POWDER, FOR SOLUTION INTRAMUSCULAR; INTRAVENOUS at 22:55

## 2020-05-14 RX ADMIN — POLYETHYLENE GLYCOL 3350 17 GRAM(S): 17 POWDER, FOR SOLUTION ORAL at 11:41

## 2020-05-14 RX ADMIN — PANTOPRAZOLE SODIUM 40 MILLIGRAM(S): 20 TABLET, DELAYED RELEASE ORAL at 06:02

## 2020-05-14 RX ADMIN — LIDOCAINE 1 PATCH: 4 CREAM TOPICAL at 01:25

## 2020-05-14 RX ADMIN — LIDOCAINE 1 PATCH: 4 CREAM TOPICAL at 07:49

## 2020-05-14 RX ADMIN — URSODIOL 300 MILLIGRAM(S): 250 TABLET, FILM COATED ORAL at 17:50

## 2020-05-14 RX ADMIN — LEVETIRACETAM 500 MILLIGRAM(S): 250 TABLET, FILM COATED ORAL at 17:50

## 2020-05-14 RX ADMIN — LIDOCAINE 1 PATCH: 4 CREAM TOPICAL at 13:00

## 2020-05-14 RX ADMIN — HYDROMORPHONE HYDROCHLORIDE 1 MILLIGRAM(S): 2 INJECTION INTRAMUSCULAR; INTRAVENOUS; SUBCUTANEOUS at 03:37

## 2020-05-14 RX ADMIN — HYDROMORPHONE HYDROCHLORIDE 1 MILLIGRAM(S): 2 INJECTION INTRAMUSCULAR; INTRAVENOUS; SUBCUTANEOUS at 06:01

## 2020-05-14 RX ADMIN — LEVETIRACETAM 500 MILLIGRAM(S): 250 TABLET, FILM COATED ORAL at 06:01

## 2020-05-14 RX ADMIN — HYDROMORPHONE HYDROCHLORIDE 1 MILLIGRAM(S): 2 INJECTION INTRAMUSCULAR; INTRAVENOUS; SUBCUTANEOUS at 11:17

## 2020-05-14 RX ADMIN — IMATINIB MESYLATE 400 MILLIGRAM(S): 400 TABLET, FILM COATED ORAL at 17:49

## 2020-05-14 RX ADMIN — OXYCODONE HYDROCHLORIDE 5 MILLIGRAM(S): 5 TABLET ORAL at 02:25

## 2020-05-14 RX ADMIN — HYDROMORPHONE HYDROCHLORIDE 1 MILLIGRAM(S): 2 INJECTION INTRAMUSCULAR; INTRAVENOUS; SUBCUTANEOUS at 16:05

## 2020-05-14 NOTE — PROGRESS NOTE ADULT - PROBLEM SELECTOR PLAN 6
U/A positive, bacteria in urine, + LE   + frequency, no hesitancy or dysuria   Urine cx 100k GNR, f/u sensitivities   C/W ceftriaxone 1 gram q24 hours (5/13 - )

## 2020-05-14 NOTE — PROGRESS NOTE ADULT - PROBLEM SELECTOR PLAN 2
significant anemia to Hb 6.0 and thrombocytopenia 13 (hb/plt 14/173 on 2/2020), in setting of relapsed ALL  S/p 2 unit of PRBC and 1 unit of platelets   - transfuse for hgb<7 and/or platelets <10K  Maintain active type and screen Patient with acute on chronic back pain, recently with normal MR spine   CT w multilevel facet joint arthropathy and degenerative joint disease in thoracic and lumbar spine   MR lumbar with enhancing T2 prolongation involving the conus ends and enhancement involving the cauda equina as well.  Will need to assess cervical and thoracic given new findings of numbness/ tingling with gait instability with MRI - f/u MRI cervical/thoracic spine   pain control with lido patch; tylenol or oxycodone 5mg PO or dilaudid 1 mg IV q6 PRN

## 2020-05-14 NOTE — PROGRESS NOTE ADULT - PROBLEM SELECTOR PLAN 3
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  Will need to assess cervical and thoracic given new findings of numbness/ tingling with gait instability with MRI - f/u MRI cervical/thoracic spine   pain control with lido patch; tylenol or oxycodone 5mg PO or dilaudid 1 mg IV q6 PRN

## 2020-05-14 NOTE — PROGRESS NOTE ADULT - PROBLEM SELECTOR PLAN 6
U/A positive, bacteria in urine, + LE   + frequency, no hesitancy or dysuria   Urine cx 100k GNR, f/u sensitivities   C/W ceftriaxone 1 gram q24 hours (5/13 - ) CT Thoracic Spine No Cont: Groundglass nodule measuring 0.7 cm is noted in the left upper lobe. Follow-up noncontrast CT in 6 months is recommended  -To be followed up outpatient with repeat imaging

## 2020-05-14 NOTE — PROGRESS NOTE ADULT - PROBLEM SELECTOR PLAN 1
WBC of ~80k (last 9.2 in 2/2020) likely 2/2 relapsed ALL, 65% blasts on CBC   Heme recs appreciated - c/w hydroxyurea 1 gram BID, start allopurinol 300mg PO daily   Spoke to heme fellow will hold mepron, valganciclovir for now  Currently patient afebrile - will c/w ceftriaxone for UTI, and monitor fever curve; if febrile will broaden to cefepime 2 grams q8 and obtain blood cultures  Bone marrow Transplant/ hematology consulted and will continue to monitor  S/p 2 unit of PRBC and 1 unit of platelets on 5/13   Maintain active type and screen  - Restart Gleevec 400mg BID per Dr. Bentley  - F/u FISH for Y, peripheral flow cytometry, and BCR-ABL  - continue IVF  - check CBC with DIFF, CMP, LDH, uric acid daily   - transfuse for hgb<7 and/or platelets <10K

## 2020-05-14 NOTE — PROGRESS NOTE ADULT - ATTENDING COMMENTS
60y F pmhx of Ph+ ALL, hx of SDH, s/p haploSCT 2/13/19, with chronic arthritic back pain p/w acute on chronic low back pain and profound b/l LE weakness, found with leukocytosis and cytopenia concerning for relapse ALL, admitted for further evaluation and management    -started on Hydrea 1gm q12 hr and IV fluids  -monitor for tumor lysis syndrome. On Allopurinol, give Elitek uric acid 8.4 on 5/13, repeat labs  -pt has Ommaya -will tap for analysis and give chemo  -repeat BM bx  -send mutation analysis including T315I and consider Ponatinib  -UTI on admission -started on Ceftriaxone. COVID19 neg on 5/12  -get EKG for baseline

## 2020-05-14 NOTE — PROGRESS NOTE ADULT - PROBLEM SELECTOR PLAN 5
Patient with no saddle anesthesia, no cord compression noted   NSX following, awaiting MRI as above    PT eval severe weakness    CT Head showing increased soft tissue prominence in the region of the hypothalamus/pituitary stalk - pending MR head for further evaluate U/A positive, bacteria in urine, + LE   + frequency, no hesitancy or dysuria   Urine cx 100k GNR, f/u sensitivities   C/W ceftriaxone 1 gram q24 hours (5/13 - )

## 2020-05-14 NOTE — PROGRESS NOTE ADULT - SUBJECTIVE AND OBJECTIVE BOX
HPC Transplant Team                                                      Critical / Counseling Time Provided: 30 minutes                                                                                                                                                        Chief Complaint:     S: Patient seen and examined with HPC Transplant Team:       O: Vitals:   Vital Signs Last 24 Hrs  T(C): 36.4 (14 May 2020 06:03), Max: 36.6 (13 May 2020 17:15)  T(F): 97.6 (14 May 2020 06:03), Max: 97.9 (13 May 2020 17:15)  HR: 72 (14 May 2020 06:03) (72 - 87)  BP: 127/65 (14 May 2020 06:03) (102/64 - 127/65)  BP(mean): --  RR: 18 (14 May 2020 06:03) (18 - 18)  SpO2: 100% (14 May 2020 06:03) (98% - 100%)    Admit weight:   Daily     Daily     Intake / Output:   05-13 @ 07:01  -  05-14 @ 07:00  --------------------------------------------------------  IN: 2425 mL / OUT: 1001 mL / NET: 1424 mL          PE:   Oropharynx:   Oral Mucositis:                                                        Grade:   CVS:   Lungs:   Abdomen:  Extremities:   Gastric Mucositis:                                                  Grade:   Intestinal Mucositis:                                              Grade:   Skin:   TLC:   Neuro:   Pain:     Labs:         Cultures:         Radiology:   EXAM:  MR SPINE LUMBAR WAW IC                        PROCEDURE DATE:  05/12/2020    IMPRESSION: Enhancing T2 prolongation involving the conus ends and enhancement involving the cauda equina as well.  Degenerative changes as described     EXAM:  CT REFORM SPINE L                        EXAM:  CT THORACIC SPINE                        PROCEDURE DATE:  05/12/2020    IMPRESSION:  No acute spinal pathology. Degenerative changes in the thoracic and lumbar spine as described above.  Groundglass nodule measuring 0.7 cm is noted in the left upper lobe. Follow-up noncontrast CT in 6 months is recommended to establish stability/resolution.    EXAM:  CT ABDOMEN AND PELVIS IC                        PROCEDURE DATE:  05/12/2020    IMPRESSION:   Mild splenomegaly.  Subtle gallstone.    EXAM:  CT BRAIN                        PROCEDURE DATE:  05/12/2020    IMPRESSION:   1.  No acute intracranial hemorrhage.   2.  Right frontal approach Ommaya reservoir catheter with tip in the third ventricle. Slightly increased size of bilateral lateral and third ventricles compared to prior exam.   3.  Question increased soft tissue prominence in the region of the hypothalamus/pituitary stalk. Consider MRI brain with contrast for further evaluation.    Meds:   Antimicrobials:   cefTRIAXone   IVPB 1000 milliGRAM(s) IV Intermittent every 24 hours      Heme / Onc:   hydroxyurea 1000 milliGRAM(s) Oral two times a day  imatinib 400 milliGRAM(s) Oral two times a day      GI:  pantoprazole    Tablet 40 milliGRAM(s) Oral before breakfast  polyethylene glycol 3350 17 Gram(s) Oral daily  senna 2 Tablet(s) Oral at bedtime  ursodiol Capsule 300 milliGRAM(s) Oral every 12 hours      Cardiovascular:       Immunologic:       Other medications:   allopurinol 300 milliGRAM(s) Oral daily  levETIRAcetam 500 milliGRAM(s) Oral two times a day  lidocaine   Patch 1 Patch Transdermal every 24 hours  sodium chloride 0.9%. 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    1. Infectious Disease:   cefTRIAXone   IVPB 1000 milliGRAM(s) IV Intermittent every 24 hours    2. GI Prophylaxis:    pantoprazole    Tablet 40 milliGRAM(s) Oral before breakfast    3. Mouthcare ; Skin care     4. GVHD prophylaxis - GVHD prophylaxis withdrew in the setting of relapsed disease     5. Transfuse & replete electrolytes prn     6. IV hydration, daily weights, strict I&O, prn diuresis     7. PO intake as tolerated, nutrition follow up as needed, MVI, folic acid     8. Antiemetics, anti-diarrhea medications:     9. OOB as tolerated, physical therapy consult if needed     10. Monitor coags / fibrinogen 2x week, vitamin K as needed     11. Monitor closely for clinical changes, monitor for fevers     12. Emotional support provided, plan of care discussed and questions addressed     I have written the above note for Dr. Herrera who performed service with me in the room.   Janett Barrow NP-C ([phone])    I have seen and examined patient with NP, I agree with above note as scribed. [facility] Transplant Team                                                      Critical / Counseling Time Provided: 30 minutes                                                                                                                                                        Chief Complaint: persistent back pain, relapsed disease     S: Patient seen and examined with [facility] Transplant Team:   + back pain   + weakness     O: Vitals:   Vital Signs Last 24 Hrs  T(C): 36.4 (14 May 2020 06:03), Max: 36.6 (13 May 2020 17:15)  T(F): 97.6 (14 May 2020 06:03), Max: 97.9 (13 May 2020 17:15)  HR: 72 (14 May 2020 06:03) (72 - 87)  BP: 127/65 (14 May 2020 06:03) (102/64 - 127/65)  BP(mean): --  RR: 18 (14 May 2020 06:03) (18 - 18)  SpO2: 100% (14 May 2020 06:03) (98% - 100%)    Admit weight: 104.3kg   Today's weight:     Intake / Output:   05-13 @ 07:01  -  05-14 @ 07:00  --------------------------------------------------------  IN: 2425 mL / OUT: 1001 mL / NET: 1424 mL      PE:   General: appears in mild distress   CVS: S1, S2 RRR   Lungs: CTA throughout bilaterally   Abdomen: + BS x 4, soft, NT, ND   Extremities: no edema   Skin: no rash   Neuro: A&O x 3  Pain: chronic low back pain     Labs:                         8.5    7.35  )-----------( 20       ( 14 May 2020 10:29 )             26.0   05-14    135  |  100  |  23  ----------------------------<  163<H>  4.1   |  21<L>  |  0.97    Ca    9.6      14 May 2020 10:29  Phos  4.2     05-14  Mg     2.0     05-14    TPro  6.5  /  Alb  3.3  /  TBili  0.2  /  DBili  x   /  AST  22  /  ALT  23  /  AlkPhos  71  05-14    Uric Acid, Serum: 5.9 mg/dL (05.14.20 @ 10:29)      Cultures:   Culture Results:   >100,000 CFU/ml Gram Negative Rods (05.13.20 @ 01:06)    Radiology:   EXAM:  MR SPINE LUMBAR WAW IC                        PROCEDURE DATE:  05/12/2020    IMPRESSION: Enhancing T2 prolongation involving the conus ends and enhancement involving the cauda equina as well.  Degenerative changes as described     EXAM:  CT REFORM SPINE L                        EXAM:  CT THORACIC SPINE                        Culture Results:   No growth at 5 days. (03.29.19 @ 21:18)    PROCEDURE DATE:  05/12/2020    IMPRESSION:  No acute spinal pathology. Degenerative changes in the thoracic and lumbar spine as described above.  Groundglass nodule measuring 0.7 cm is noted in the left upper lobe. Follow-up noncontrast CT in 6 months is recommended to establish stability/resolution.    EXAM:  CT ABDOMEN AND PELVIS IC                        PROCEDURE DATE:  05/12/2020    IMPRESSION:   Mild splenomegaly.  Subtle gallstone.    EXAM:  CT BRAIN                        PROCEDURE DATE:  05/12/2020    IMPRESSION:   1.  No acute intracranial hemorrhage.   2.  Right frontal approach Ommaya reservoir catheter with tip in the third ventricle. Slightly increased size of bilateral lateral and third ventricles compared to prior exam.   3.  Question increased soft tissue prominence in the region of the hypothalamus/pituitary stalk. Consider MRI brain with contrast for further evaluation.    Meds:   Antimicrobials:   cefTRIAXone   IVPB 1000 milliGRAM(s) IV Intermittent every 24 hours      Heme / Onc:   hydroxyurea 1000 milliGRAM(s) Oral two times a day  imatinib 400 milliGRAM(s) Oral two times a day      GI:  pantoprazole    Tablet 40 milliGRAM(s) Oral before breakfast  polyethylene glycol 3350 17 Gram(s) Oral daily  senna 2 Tablet(s) Oral at bedtime  ursodiol Capsule 300 milliGRAM(s) Oral every 12 hours      Cardiovascular:       Immunologic:       Other medications:   allopurinol 300 milliGRAM(s) Oral daily  levETIRAcetam 500 milliGRAM(s) Oral two times a day  lidocaine   Patch 1 Patch Transdermal every 24 hours  sodium chloride 0.9%. 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    1. Infectious Disease:   cefTRIAXone   IVPB 1000 milliGRAM(s) IV Intermittent every 24 hours    2. GI Prophylaxis:    pantoprazole    Tablet 40 milliGRAM(s) Oral before breakfast    3. Mouthcare ; Skin care     4. GVHD prophylaxis - GVHD prophylaxis withdrew in the setting of relapsed disease     5. Transfuse & replete electrolytes prn     6. IV hydration, daily weights, strict I&O, prn diuresis     7. PO intake as tolerated, nutrition follow up as needed, MVI, folic acid     8. Antiemetics, anti-diarrhea medications:     9. OOB as tolerated, physical therapy consult if needed     10. Monitor coags / fibrinogen 2x week, vitamin K as needed     11. Monitor closely for clinical changes, monitor for fevers     12. Emotional support provided, plan of care discussed and questions addressed     I have written the above note for Dr. Herrera who performed service with me in the room.   Janett Brarow NP-C ([phone])    I have seen and examined patient with NP, I agree with above note as scribed.

## 2020-05-14 NOTE — PROGRESS NOTE ADULT - PROBLEM SELECTOR PLAN 4
BP currently soft  Will hold home antihypertensives for now and monitor with vitals q8 Patient with no saddle anesthesia, no cord compression noted   NSX following, awaiting MRI as above    PT eval severe weakness    CT Head showing increased soft tissue prominence in the region of the hypothalamus/pituitary stalk - pending MR head for further evaluate

## 2020-05-14 NOTE — PROGRESS NOTE ADULT - SUBJECTIVE AND OBJECTIVE BOX
PROGRESS NOTE:   Authored by Dr. Zuleyka De Anda MD Pager [phone] [facility], [id number] [address]     Patient is a 60y old  Female who presents with a chief complaint of numbness and weakness (13 May 2020 09:03)    SUBJECTIVE / OVERNIGHT EVENTS: No acute events overnight. This am pt complains of back pain and weakness.     ADDITIONAL REVIEW OF SYSTEMS: No urinary or fecal incontinence, fevers, chills, N/V/D, chest pain, abdominal pain     MEDICATIONS  (STANDING):  allopurinol 300 milliGRAM(s) Oral daily  cefTRIAXone   IVPB 1000 milliGRAM(s) IV Intermittent every 24 hours  hydroxyurea 1000 milliGRAM(s) Oral two times a day  imatinib 400 milliGRAM(s) Oral two times a day  levETIRAcetam 500 milliGRAM(s) Oral two times a day  lidocaine   Patch 1 Patch Transdermal every 24 hours  pantoprazole    Tablet 40 milliGRAM(s) Oral before breakfast  polyethylene glycol 3350 17 Gram(s) Oral daily  senna 2 Tablet(s) Oral at bedtime  sodium chloride 0.9%. 500 milliLiter(s) (75 mL/Hr) IV Continuous <Continuous>  ursodiol Capsule 300 milliGRAM(s) Oral every 12 hours    MEDICATIONS  (PRN):  HYDROmorphone  Injectable 1 milliGRAM(s) IV Push every 6 hours PRN Severe Pain (7 - 10)  oxyCODONE    IR 5 milliGRAM(s) Oral every 6 hours PRN Moderate Pain (4 - 6)      CAPILLARY BLOOD GLUCOSE        I&O's Summary    13 May 2020 07:01  -  14 May 2020 07:00  --------------------------------------------------------  IN: 2425 mL / OUT: 1001 mL / NET: 1424 mL        PHYSICAL EXAM:  Vital Signs Last 24 Hrs  T(C): 36.4 (14 May 2020 06:03), Max: 36.6 (13 May 2020 17:15)  T(F): 97.6 (14 May 2020 06:03), Max: 97.9 (13 May 2020 17:15)  HR: 72 (14 May 2020 06:03) (72 - 87)  BP: 127/65 (14 May 2020 06:03) (102/64 - 127/65)  BP(mean): --  RR: 18 (14 May 2020 06:03) (18 - 18)  SpO2: 100% (14 May 2020 06:03) (98% - 100%)    GENERAL: NAD, morbidly obese   	HEAD:  Atraumatic, Normocephalic  	EYES: EOMI, conjunctiva and sclera clear  	NECK: Supple, No JVD  	CHEST/LUNG: Clear to auscultation bilaterally; No wheeze  	HEART: Regular rate and rhythm; Normal s1s2   	ABDOMEN: Soft, Nontender, distended, + bowel sounds   	BACK: tender to palpation along cervical and thoracic spine with no overt paraspinal muscle tenderness, appears to have lordosis   	EXTREMITIES:, No clubbing, cyanosis, or edema  	PSYCH: AAOx3  	NEUROLOGY: sensation in tact b/l, 3/5 strength on the rle and 3/5 strength LLE, 5/5 strength b/l UPE   SKIN: scattered ecchymoses    LABS:                        x      x     )-----------( 37       ( 13 May 2020 10:30 )             x        05-13    137  |  103  |  27<H>  ----------------------------<  144<H>  4.3   |  22  |  1.04    Ca    9.6      13 May 2020 10:29  Phos  4.3     05-13  Mg     2.2     05-13    TPro  6.6  /  Alb  3.5  /  TBili  0.2  /  DBili  x   /  AST  24  /  ALT  24  /  AlkPhos  71  05-13    PT/INR - ( 13 May 2020 10:29 )   PT: 10.6 sec;   INR: 0.93 ratio         PTT - ( 13 May 2020 10:29 )  PTT:25.2 sec      Urinalysis Basic - ( 12 May 2020 21:12 )    Color: Yellow / Appearance: Clear / S.042 / pH: x  Gluc: x / Ketone: Negative  / Bili: Negative / Urobili: Negative   Blood: x / Protein: Trace / Nitrite: Positive   Leuk Esterase: Large / RBC: 1 /hpf / WBC 94 /HPF   Sq Epi: x / Non Sq Epi: 1 /hpf / Bacteria: Many        Culture - Urine (collected 13 May 2020 01:06)  Source: .Urine Clean Catch (Midstream)  Preliminary Report (13 May 2020 19:51):    >100,000 CFU/ml Gram Negative Rods        RADIOLOGY & ADDITIONAL TESTS:  Results Reviewed:   Imaging Personally Reviewed:  Electrocardiogram Personally Reviewed:    COORDINATION OF CARE:  Care Discussed with Consultants/Other Providers [Y/N]:  Prior or Outpatient Records Reviewed [Y/N]:

## 2020-05-14 NOTE — PROGRESS NOTE ADULT - PROBLEM SELECTOR PLAN 2
significant anemia to Hb 6.0 and thrombocytopenia 13 (hb/plt 14/173 on 2/2020), in setting of relapsed ALL  S/p 2 unit of PRBC and 1 unit of platelets   - transfuse for hgb<7 and/or platelets <10K  Maintain active type and screen

## 2020-05-14 NOTE — PROGRESS NOTE ADULT - PROBLEM SELECTOR PLAN 1
WBC of ~80k (last 9.2 in 2/2020) likely 2/2 relapsed ALL, 65% blasts on CBC   Heme recs appreciated - c/w hydroxyurea 1 gram BID, start allopurinol 300mg PO daily   Spoke to heme fellow will hold mepron, valganciclovir for now  Currently patient afebrile - will c/w ceftriaxone for UTI, and monitor fever curve; if febrile will broaden to cefepime 2 grams q8 and obtain blood cultures  Bone marrow Transplant/ hematology consulted and will continue to monitor  S/p 2 unit of PRBC and 1 unit of platelets on 5/13   Maintain active type and screen  - Restart Gleevec 400mg BID per Dr. Bentley  - F/u FISH for Y, peripheral flow cytometry, and BCR-ABL  - continue IVF  - check CBC with DIFF, CMP, LDH, uric acid daily   - transfuse for hgb<7 and/or platelets <10K WBC of ~80k (last 9.2 in 2/2020) likely 2/2 relapsed ALL, 65% blasts on CBC   Heme recs appreciated - started hydroxyurea 1 gram BID & allopurinol on admission   Hold mepron, valganciclovir for now - follow CMV PCR Monday / Thursday   Currently patient afebrile - will c/w ceftriaxone for UTI, and monitor fever curve; if febrile will broaden to cefepime 2 grams q8 and obtain blood cultures  S/p 2 unit of PRBC and 1 unit of platelets on 5/13   Maintain active type and screen  - Restart Gleevec 400mg BID per Dr. Bentley  - F/u FISH for Y, peripheral flow cytometry, and BCR-ABL  - continue IVF  - check CBC with DIFF, CMP, LDH, uric acid daily   - transfuse for hgb<7 and/or platelets <10K  Elitek prn for elevated uric acid  Access ommaya resevoir - send CSF for flow, instill IT chemo

## 2020-05-15 ENCOUNTER — RESULT REVIEW (OUTPATIENT)
Age: 60
End: 2020-05-15

## 2020-05-15 DIAGNOSIS — K59.00 CONSTIPATION, UNSPECIFIED: ICD-10-CM

## 2020-05-15 DIAGNOSIS — R52 PAIN, UNSPECIFIED: ICD-10-CM

## 2020-05-15 DIAGNOSIS — R53.81 OTHER MALAISE: ICD-10-CM

## 2020-05-15 DIAGNOSIS — R20.2 PARESTHESIA OF SKIN: ICD-10-CM

## 2020-05-15 DIAGNOSIS — Z51.5 ENCOUNTER FOR PALLIATIVE CARE: ICD-10-CM

## 2020-05-15 DIAGNOSIS — G91.9 HYDROCEPHALUS, UNSPECIFIED: ICD-10-CM

## 2020-05-15 LAB
ALBUMIN SERPL ELPH-MCNC: 3.1 G/DL — LOW (ref 3.3–5)
ALP SERPL-CCNC: 65 U/L — SIGNIFICANT CHANGE UP (ref 40–120)
ALT FLD-CCNC: 23 U/L — SIGNIFICANT CHANGE UP (ref 10–45)
ANION GAP SERPL CALC-SCNC: 9 MMOL/L — SIGNIFICANT CHANGE UP (ref 5–17)
APPEARANCE CSF: CLEAR — SIGNIFICANT CHANGE UP
AST SERPL-CCNC: 21 U/L — SIGNIFICANT CHANGE UP (ref 10–40)
BASOPHILS # BLD AUTO: 0 K/UL — SIGNIFICANT CHANGE UP (ref 0–0.2)
BASOPHILS NFR BLD AUTO: 0 % — SIGNIFICANT CHANGE UP (ref 0–2)
BCR/ABL BY RT - PCR QUANTITATIVE: SIGNIFICANT CHANGE UP
BILIRUB DIRECT SERPL-MCNC: <0.1 MG/DL — SIGNIFICANT CHANGE UP (ref 0–0.2)
BILIRUB INDIRECT FLD-MCNC: >0.1 MG/DL — LOW (ref 0.2–1)
BILIRUB SERPL-MCNC: 0.2 MG/DL — SIGNIFICANT CHANGE UP (ref 0.2–1.2)
BLASTS # FLD: 33 % — HIGH (ref 0–0)
BLD GP AB SCN SERPL QL: NEGATIVE — SIGNIFICANT CHANGE UP
BUN SERPL-MCNC: 19 MG/DL — SIGNIFICANT CHANGE UP (ref 7–23)
CALCIUM SERPL-MCNC: 9.2 MG/DL — SIGNIFICANT CHANGE UP (ref 8.4–10.5)
CHLORIDE SERPL-SCNC: 101 MMOL/L — SIGNIFICANT CHANGE UP (ref 96–108)
CMV DNA CSF QL NAA+PROBE: SIGNIFICANT CHANGE UP
CO2 SERPL-SCNC: 24 MMOL/L — SIGNIFICANT CHANGE UP (ref 22–31)
COLOR CSF: SIGNIFICANT CHANGE UP
CREAT SERPL-MCNC: 0.97 MG/DL — SIGNIFICANT CHANGE UP (ref 0.5–1.3)
EOSINOPHIL # BLD AUTO: 0 K/UL — SIGNIFICANT CHANGE UP (ref 0–0.5)
EOSINOPHIL NFR BLD AUTO: 0 % — SIGNIFICANT CHANGE UP (ref 0–6)
GLUCOSE CSF-MCNC: 48 MG/DL — SIGNIFICANT CHANGE UP (ref 40–70)
GLUCOSE SERPL-MCNC: 112 MG/DL — HIGH (ref 70–99)
GRAM STN FLD: SIGNIFICANT CHANGE UP
HCT VFR BLD CALC: 24.3 % — LOW (ref 34.5–45)
HGB BLD-MCNC: 7.9 G/DL — LOW (ref 11.5–15.5)
LDH CSF L TO P-CCNC: 164 U/L — SIGNIFICANT CHANGE UP
LDH FLD-CCNC: 164 U/L — SIGNIFICANT CHANGE UP
LDH SERPL L TO P-CCNC: 141 U/L — SIGNIFICANT CHANGE UP (ref 50–242)
LYMPHOCYTES # BLD AUTO: 6.61 K/UL — HIGH (ref 1–3.3)
LYMPHOCYTES # BLD AUTO: 60 % — HIGH (ref 13–44)
LYMPHOCYTES # CSF: 76 % — SIGNIFICANT CHANGE UP (ref 40–80)
MAGNESIUM SERPL-MCNC: 2.1 MG/DL — SIGNIFICANT CHANGE UP (ref 1.6–2.6)
MANUAL SMEAR VERIFICATION: SIGNIFICANT CHANGE UP
MCHC RBC-ENTMCNC: 31.1 PG — SIGNIFICANT CHANGE UP (ref 27–34)
MCHC RBC-ENTMCNC: 32.5 GM/DL — SIGNIFICANT CHANGE UP (ref 32–36)
MCV RBC AUTO: 95.7 FL — SIGNIFICANT CHANGE UP (ref 80–100)
MONOCYTES # BLD AUTO: 0.1 K/UL — SIGNIFICANT CHANGE UP (ref 0–0.9)
MONOCYTES NFR BLD AUTO: 0.9 % — LOW (ref 2–14)
MONOS+MACROS NFR CSF: 5 % — LOW (ref 15–45)
NEUTROPHILS # BLD AUTO: 0.48 K/UL — LOW (ref 1.8–7.4)
NEUTROPHILS # CSF: 0 % — SIGNIFICANT CHANGE UP (ref 0–6)
NEUTROPHILS NFR BLD AUTO: 3.5 % — LOW (ref 43–77)
NEUTS BAND # BLD: 0.9 % — SIGNIFICANT CHANGE UP (ref 0–8)
NRBC # BLD: 1 /100 — HIGH (ref 0–0)
NRBC NFR CSF: 1360 /UL — HIGH (ref 0–5)
OTHER CELLS CSF MANUAL: 19 % — HIGH (ref 0–0)
PHOSPHATE SERPL-MCNC: 4 MG/DL — SIGNIFICANT CHANGE UP (ref 2.5–4.5)
PLAT MORPH BLD: NORMAL — SIGNIFICANT CHANGE UP
PLATELET # BLD AUTO: 17 K/UL — CRITICAL LOW (ref 150–400)
POTASSIUM SERPL-MCNC: 5.1 MMOL/L — SIGNIFICANT CHANGE UP (ref 3.5–5.3)
POTASSIUM SERPL-SCNC: 5.1 MMOL/L — SIGNIFICANT CHANGE UP (ref 3.5–5.3)
PROT CSF-MCNC: 56 MG/DL — HIGH (ref 15–45)
PROT SERPL-MCNC: 6.3 G/DL — SIGNIFICANT CHANGE UP (ref 6–8.3)
RBC # BLD: 2.54 M/UL — LOW (ref 3.8–5.2)
RBC # CSF: 70 /UL — HIGH (ref 0–0)
RBC # FLD: 16.8 % — HIGH (ref 10.3–14.5)
RBC BLD AUTO: SIGNIFICANT CHANGE UP
RH IG SCN BLD-IMP: POSITIVE — SIGNIFICANT CHANGE UP
SODIUM SERPL-SCNC: 134 MMOL/L — LOW (ref 135–145)
SPECIMEN SOURCE: SIGNIFICANT CHANGE UP
TUBE TYPE: SIGNIFICANT CHANGE UP
URATE SERPL-MCNC: 4.3 MG/DL — SIGNIFICANT CHANGE UP (ref 2.5–7)
VARIANT LYMPHS # BLD: 1.7 % — SIGNIFICANT CHANGE UP (ref 0–6)
WBC # BLD: 11.02 K/UL — HIGH (ref 3.8–10.5)
WBC # FLD AUTO: 11.02 K/UL — HIGH (ref 3.8–10.5)

## 2020-05-15 PROCEDURE — 72157 MRI CHEST SPINE W/O & W/DYE: CPT | Mod: 26

## 2020-05-15 PROCEDURE — 99233 SBSQ HOSP IP/OBS HIGH 50: CPT | Mod: GC

## 2020-05-15 PROCEDURE — 99233 SBSQ HOSP IP/OBS HIGH 50: CPT

## 2020-05-15 PROCEDURE — 88189 FLOWCYTOMETRY/READ 16 & >: CPT

## 2020-05-15 PROCEDURE — 85097 BONE MARROW INTERPRETATION: CPT

## 2020-05-15 PROCEDURE — 88305 TISSUE EXAM BY PATHOLOGIST: CPT | Mod: 26

## 2020-05-15 PROCEDURE — 38222 DX BONE MARROW BX & ASPIR: CPT | Mod: AS

## 2020-05-15 PROCEDURE — 88313 SPECIAL STAINS GROUP 2: CPT | Mod: 26

## 2020-05-15 PROCEDURE — 88108 CYTOPATH CONCENTRATE TECH: CPT | Mod: 26,59

## 2020-05-15 PROCEDURE — G0452: CPT | Mod: 26

## 2020-05-15 RX ORDER — METHOTREXATE 2.5 MG/1
12 TABLET ORAL ONCE
Refills: 0 | Status: DISCONTINUED | OUTPATIENT
Start: 2020-05-15 | End: 2020-05-23

## 2020-05-15 RX ORDER — HYDROMORPHONE HYDROCHLORIDE 2 MG/ML
1 INJECTION INTRAMUSCULAR; INTRAVENOUS; SUBCUTANEOUS ONCE
Refills: 0 | Status: DISCONTINUED | OUTPATIENT
Start: 2020-05-15 | End: 2020-05-15

## 2020-05-15 RX ORDER — HYDROMORPHONE HYDROCHLORIDE 2 MG/ML
0.5 INJECTION INTRAMUSCULAR; INTRAVENOUS; SUBCUTANEOUS ONCE
Refills: 0 | Status: DISCONTINUED | OUTPATIENT
Start: 2020-05-15 | End: 2020-05-15

## 2020-05-15 RX ADMIN — URSODIOL 300 MILLIGRAM(S): 250 TABLET, FILM COATED ORAL at 18:07

## 2020-05-15 RX ADMIN — HYDROMORPHONE HYDROCHLORIDE 1 MILLIGRAM(S): 2 INJECTION INTRAMUSCULAR; INTRAVENOUS; SUBCUTANEOUS at 12:07

## 2020-05-15 RX ADMIN — IMATINIB MESYLATE 400 MILLIGRAM(S): 400 TABLET, FILM COATED ORAL at 18:07

## 2020-05-15 RX ADMIN — HYDROMORPHONE HYDROCHLORIDE 1 MILLIGRAM(S): 2 INJECTION INTRAMUSCULAR; INTRAVENOUS; SUBCUTANEOUS at 10:32

## 2020-05-15 RX ADMIN — HYDROMORPHONE HYDROCHLORIDE 0.5 MILLIGRAM(S): 2 INJECTION INTRAMUSCULAR; INTRAVENOUS; SUBCUTANEOUS at 01:02

## 2020-05-15 RX ADMIN — SENNA PLUS 2 TABLET(S): 8.6 TABLET ORAL at 22:00

## 2020-05-15 RX ADMIN — HYDROMORPHONE HYDROCHLORIDE 1 MILLIGRAM(S): 2 INJECTION INTRAMUSCULAR; INTRAVENOUS; SUBCUTANEOUS at 03:09

## 2020-05-15 RX ADMIN — URSODIOL 300 MILLIGRAM(S): 250 TABLET, FILM COATED ORAL at 05:03

## 2020-05-15 RX ADMIN — Medication 300 MILLIGRAM(S): at 13:06

## 2020-05-15 RX ADMIN — Medication 1 MILLIGRAM(S): at 12:05

## 2020-05-15 RX ADMIN — IMATINIB MESYLATE 400 MILLIGRAM(S): 400 TABLET, FILM COATED ORAL at 05:03

## 2020-05-15 RX ADMIN — POLYETHYLENE GLYCOL 3350 17 GRAM(S): 17 POWDER, FOR SOLUTION ORAL at 13:06

## 2020-05-15 RX ADMIN — HYDROMORPHONE HYDROCHLORIDE 1 MILLIGRAM(S): 2 INJECTION INTRAMUSCULAR; INTRAVENOUS; SUBCUTANEOUS at 22:00

## 2020-05-15 RX ADMIN — LEVETIRACETAM 500 MILLIGRAM(S): 250 TABLET, FILM COATED ORAL at 18:06

## 2020-05-15 RX ADMIN — SODIUM CHLORIDE 75 MILLILITER(S): 9 INJECTION INTRAMUSCULAR; INTRAVENOUS; SUBCUTANEOUS at 05:03

## 2020-05-15 RX ADMIN — HYDROMORPHONE HYDROCHLORIDE 1 MILLIGRAM(S): 2 INJECTION INTRAMUSCULAR; INTRAVENOUS; SUBCUTANEOUS at 18:07

## 2020-05-15 RX ADMIN — OXYCODONE HYDROCHLORIDE 5 MILLIGRAM(S): 5 TABLET ORAL at 04:51

## 2020-05-15 RX ADMIN — PANTOPRAZOLE SODIUM 40 MILLIGRAM(S): 20 TABLET, DELAYED RELEASE ORAL at 05:03

## 2020-05-15 RX ADMIN — HYDROMORPHONE HYDROCHLORIDE 1 MILLIGRAM(S): 2 INJECTION INTRAMUSCULAR; INTRAVENOUS; SUBCUTANEOUS at 07:13

## 2020-05-15 RX ADMIN — LEVETIRACETAM 500 MILLIGRAM(S): 250 TABLET, FILM COATED ORAL at 05:03

## 2020-05-15 RX ADMIN — CEFTRIAXONE 100 MILLIGRAM(S): 500 INJECTION, POWDER, FOR SOLUTION INTRAMUSCULAR; INTRAVENOUS at 22:00

## 2020-05-15 NOTE — PROGRESS NOTE ADULT - PROBLEM SELECTOR PLAN 1
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 Bentley  - F/u FISH for Y, peripheral flow cytometry, and BCR-ABL  - continue IVF  - check CBC with DIFF, CMP, LDH, uric acid daily   - transfuse for hgb<7 and/or platelets <10K  Elitek prn for elevated uric acid  Access ommaya resevoir - send CSF for flow, instill IT chemo WBC of ~80k (last 9.2 in 2/2020) likely 2/2 relapsed ALL, 65% blasts on CBC   Heme recs appreciated - started hydroxyurea 1 gram BID & allopurinol on admission   Hold mepron, valganciclovir for now - follow CMV PCR Monday / Thursday   Currently patient afebrile - will c/w ceftriaxone for UTI, and monitor fever curve; if febrile will broaden to cefepime 2 grams q8 and obtain blood cultures  S/p 2 unit of PRBC and 1 unit of platelets on 5/13   Maintain active type and screen  - Restart Gleevec 400mg BID per Dr. Bentley  - F/u FISH for Y, peripheral flow cytometry, and BCR-ABL  - continue IVF  - check CBC with DIFF, CMP, LDH, uric acid daily   - transfuse for hgb<7 and/or platelets <10K  Elitek prn for elevated uric acid  [facility] send [facility] for flow, instill IT chemo  Bone marrow bx 5/15/20

## 2020-05-15 NOTE — DIETITIAN INITIAL EVALUATION ADULT. - PHYSICAL APPEARANCE
Unable to perform nutrition focused physical exam at this time due to limited contact precautions in setting of current viral pandemic/other (specify)/obese Ht: 5'3", Wt: 229.4 lbs, BMI: 40.7 kg/m2, IBW: 115 lbs +/- 10%, %IBW: 199%  No edema, Skin intact per nursing flowsheets

## 2020-05-15 NOTE — DIETITIAN INITIAL EVALUATION ADULT. - PROBLEM SELECTOR PLAN 1
WBC of ~80k (last 9.2 in 2/2020) likely 2/2 relapsed ALL, 65% blasts on CBC   Heme recs appreciated -  s/p hydroxyurea 2 grams state, will start allopurinol 300mg PO daily   Spoke to heme fellow will hold mepron, valganciclovir for now  Currently patient afebrile - will c/w ceftriaxone for UTI, and monitor fever curve; if febrile will  broaden to cefepime 2 grams q8 and obtain blood cultures  Bone marrow Transplant/ hematology consulted and will continue to monitor  Will get 1 unit of PRBC and 1 unit of platelets   Post transfusion cbc to be followed  Maintain active type and screen

## 2020-05-15 NOTE — DIETITIAN INITIAL EVALUATION ADULT. - ENERGY INTAKE
Good (>75%)/Pt reports eating well in house, stated she is able to eat >50% of the meal generally, per flowsheets intake appears variable 20-90% meal completion-pt requesting ensure shakes

## 2020-05-15 NOTE — CONSULT NOTE ADULT - ATTENDING COMMENTS
In the event of newly developing, evolving, or worsening symptoms, please contact the Palliative Medicine team via pager (if the patient is at [facility] #[phone] or if the patient is at [facility] #[phone]) The Geriatric and Palliative Medicine service has coverage 24 hours a day/ 7 days a week to provide medical recommendations regarding symptom management needs via telephone

## 2020-05-15 NOTE — DIETITIAN INITIAL EVALUATION ADULT. - PROBLEM SELECTOR PLAN 3
Patient with acute on chronic back pain, recently with normal MR spine   CT w multilevel facet joint arthropathy and degenerative joint disease in thoracic and lumbar spine   MR lumbar with no evidence of cord compression (official read pending)   Will need to assess cervical and thoracic given new findings of numbness/ tingles with gait instability with MRI oer NSX - f/u MRI cervical/thoracic spine   pain control with lido patch; tylenol or oxycodone 5mg PO or morphine 4mg IVP as needed

## 2020-05-15 NOTE — DIETITIAN INITIAL EVALUATION ADULT. - PROBLEM SELECTOR PLAN 8
DVT PPX: will hold in the setting of severe thrombocytopenia   DIET: regular , dysphagia screen at bedside   Dispo: tentative , Sw consult, fall precautions

## 2020-05-15 NOTE — DIETITIAN INITIAL EVALUATION ADULT. - REASON INDICATOR FOR ASSESSMENT
Pt seen for BMI >40. Information obtained from pt and previous RD note.     Pt with PMH including Ph+ ALL S/P autologous peripheral blood stem cell transplant 2016 and haploidentical peripheral blood stem cell transplant 2019 admitted with lower back pain, leg weakness, concern for relapsed disease.

## 2020-05-15 NOTE — DIETITIAN INITIAL EVALUATION ADULT. - OTHER INFO
Diet PTA: Pt reports eating well with good appetite at home consuming 3 meals per day consisting of healthier food items than usual per pt, stated she had been craving fruits and vegetables and had been making an effort to eat those types of foods. Pt supplements with carnation instant breakfast at home. Pt does not follow any formal dietary restrictions.     Weight: Pt unsure of UBW, stated the last time she remembers being weighed was when she was hospitalized ~ 1 year ago, noted weight at that time 240.9 lbs (5/12). Pt with outpatient weights per [facility] 222.6 lbs (4/2019), 223.5 lbs (11/2019), 215.2 lbs (1/2020), 218.4 lbs (2/2020), weight decreased but seems to be uptrending recently, pt reports feeling as though she has been gaining weight and noticed clothing fitting tighter than usual, noted current dosing weight 229.4 lbs (5/12).     Pt with food allergy to shellfish-experiences nausea and teary eyes when consumed. Taking multivitamin and folic acid at home. No N+V in house, no BM thus far in house-last Bm noted 5/10-ordered for bowel regimen. Pt with no difficulty chewing/swallowing.    Education: RD reviewed general healthy eating nutrition therapy, emphasized importance of adequate po intake at this time with overall goal for weight maintenance, when medically stable pt may focus on gradual weight loss as able.

## 2020-05-15 NOTE — PROGRESS NOTE ADULT - ATTENDING COMMENTS
60y F pmhx of Ph+ ALL, hx of SDH, s/p haploSCT 2/13/19, with chronic arthritic back pain p/w acute on chronic low back pain and profound b/l LE weakness, found with leukocytosis and cytopenia concerning for relapse ALL, admitted for further evaluation and management    -started on Hydrea 1gm q12 hr and IV fluids  -monitor for tumor lysis syndrome. On Allopurinol, give Elitek uric acid 8.4 on 5/13, repeat labs  -pt has Ommaya -will tap for analysis and give chemo  -repeat BM bx  -send mutation analysis including T315I and consider Ponatinib  -UTI on admission -started on Ceftriaxone. COVID19 neg on 5/12  -get EKG for baseline 60y F pmhx of Ph+ ALL, hx of SDH, s/p haploSCT 2/13/19, with chronic arthritic back pain p/w acute on chronic low back pain and profound b/l LE weakness, found with leukocytosis and cytopenia concerning for relapse ALL, admitted for further evaluation and management    -d/c'd Hydrea 1gm q12 hr, restarted Gleevec. Cont IV fluids  -monitor for tumor lysis syndrome. On Allopurinol, given Elitek uric acid 8.4 on 5/13  -pt has Sree -will discuss MRI results with neurosurgery before tap for analysis and IT chemo  -repeat BMbx  -send mutation analysis including T315I and consider Ponatinib  -UTI on admission -started on Ceftriaxone. COVID19 neg on 5/12  -palliative care consult

## 2020-05-15 NOTE — CHART NOTE - NSCHARTNOTEFT_GEN_A_CORE
Upon Nutritional Assessment by the Registered Dietitian your patient was determined to meet criteria / has evidence of the following diagnosis/diagnoses:          [ ]  Mild Protein Calorie Malnutrition        [ ]  Moderate Protein Calorie Malnutrition        [ ] Severe Protein Calorie Malnutrition        [ ] Unspecified Protein Calorie Malnutrition        [ ] Underweight / BMI <19        [ x] Morbid Obesity / BMI > 40      Findings as based on:  [x ] Comprehensive nutrition assessment   [ ] Nutrition Focused Physical Exam  [x ] Other: measurement of height and weight: BMI 40.7      Nutrition Plan/Recommendations:  Reinforce weight management nutrition therapy when medically stable-not appropriate at this time.         PROVIDER Section:     By signing this assessment you are acknowledging and agree with the diagnosis/diagnoses assigned by the Registered Dietitian    Comments:

## 2020-05-15 NOTE — DIETITIAN INITIAL EVALUATION ADULT. - PROBLEM SELECTOR PLAN 2
significant anemia to Hb 6.0 and thrombocytopenia 13 (hb/plt 14/173 on 2/2020), in setting of relapsed ALL  Will get 1 unit of PRBC and 1 unit of platelets   check post transfusion cbc  keep Hb >7.0 and platelets >10 or >15 if febrile   Maintain active type and screen

## 2020-05-15 NOTE — CONSULT NOTE ADULT - PROBLEM SELECTOR RECOMMENDATION 9
Upper extremity acral paresthesias bilaterally  fingers and aspects of the palm  She reports some hand weakness

## 2020-05-15 NOTE — DIETITIAN INITIAL EVALUATION ADULT. - ADD RECOMMEND
1. Continue to encourage po intake and obtain/honor food preferences as able, 2. Monitor PO intake, diet tolerance, weight trends, labs, and skin integrity, 3. BMI >40 alert placed in chart, medical team informed of need to sign

## 2020-05-15 NOTE — PROGRESS NOTE ADULT - PROBLEM SELECTOR PLAN 7
EXAM:  MR BRAIN                        PROCEDURE DATE:  05/14/2020    IMPRESSION: New mild to moderate acute hydrocephalus present with associated transependymal flow of cerebral spinal fluid.  Abnormal signal involves the subarachnoid space and layering fluid is noted within the ventricular system. Differential considerations include meningitis or carcinomatosis, less likely subarachnoid hemorrhage.   Thickening of the pituitary stalk, lower hypothalamus, and optic chiasm is present suspicious for an underlying tumor, infection, or inflammatory process.  Abnormal signal involves the brainstem and portions of the cerebellum as described. Some considerations include a rhombencephalitis, chemotherapy/medication effect, PML, demyelination, versus underlying infectious, neoplastic, or inflammatory lesion.  Edema and swelling is present in the right frontal lobe surrounding the Ommaya reservoir catheter. This could reflect leaking of CSF around the catheter from the ventricular system given the new hydrocephalus, versus underlying infection or mass lesion.  A follow-up brain MRI with contrast is recommended for further workup.

## 2020-05-15 NOTE — CONSULT NOTE ADULT - SUBJECTIVE AND OBJECTIVE BOX
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 Pt sent to ED by her hematologist office, Dr. Bentley where she had an appointment today for routine blood work. While trying to get to her appt, pt was profoundly weak and could not even get out of her car,  adn was referred by Dr Bentley to the hospital for further evaluation. Additional history obtained from patients emergency contact Germaine (daughter in law). Patient had missed her routine appointment with hematology last week. Has been experiencing acute on chronic severe back pain. Has been having ongoing back pain since , however given the increased severity of her symptoms she visited an orthopedist 3 weeks ago and had MRI of lumbrosacral area, which reportedly only demonstrated arthritis. Patients LE weakness has progressed to the point where she was only able to stand last week, and this week she is unable to do even that, falling to her knees. Was given a walker for mothers day to assist with ambulation given the increased difficulty. Patient has experienced night sweats, but denies fever.   Had reported to have been in remission following BM transplant in 2019 from her son, however PCR was positive as of last note written in April.  Patient lives alone with family near by.  Patient has had hx of falls, last fall 2 weeks ago, no head trauma, no LOC. Patient states since then she has experienced new b/l hand tingling and numbness and LE numbness and tingling. Since then she has become more unstable. Admits to chronic leg pain/stiffness that has worsened due to lack of activity. Pt denies IVDA, falls/trauma, dizziness, HA, syncope, change in vision, chest pain, SOB, cough, abdominal pain, n/v/d, dysuria, hematuria, change in stools, urinary or fecal retention/incontinence, or saddle anesthesia prior episodes. She denies sick contact. Patient had been given last dose of inotuzamab in oct/ 2019 per outpatient records.     VS in the ED /79, HR 75, RR 16, T 98.3 F, saturating 100% RA (12 May 2020 22:00)        Pain Assessment (scores are out of 10)    O months to years ? worsening  L back  D daily  C dull  A rest/Rx  R none  T none  S severe    Pain Now: 9  Pain average over 24 hours: 7  Pain maximum: 10  Onset of prn (minutes):  Pain minimum: TBD  Time to pain minimum (minutes): TBD  Personalized pain goal: TBD  Duration of prn (hours): TBD  Ascent of pain: fast  Pain score at which prn is requested: TBD    Does the pain have a negative impact on the following domains  An "X" denotes yes    General activity                        [x]  Walking ability                          [x]  Mood                                          []  Sleep                                           [x]  Normal Work                            [x]  Relations with other people  []  Enjoyment of life                     []  Cognitive Tasks                        []    PERTINENT PM/SXH:   Herpes zoster  Leukemia  Clostridium difficile diarrhea  Intractable migraine with status migrainosus, unspecified migraine type  Sciatica of right side  Chronic gastritis, presence of bleeding unspecified, unspecified gastritis type  Essential hypertension    Lipoma  Elective surgical procedure  History of  delivery    FAMILY HISTORY:  No pertinent family history in first degree relatives    ITEMS NOT CHECKED ARE NOT PRESENT    SOCIAL HISTORY:   Significant other/partner[ ]  Children[x ]  Samaritan/Spirituality:  Substance hx:  [ ]   Tobacco hx:  [ ]   Alcohol hx: [ ]   Home Opioid hx:  [ ] I-Stop Reference No:  Living Situation: [ x]Home  [ ]Long term care  [ ]Rehab [ ]Other    ADVANCE DIRECTIVES:    DNR  MOLST  [ ]  Living Will  [ ]   DECISION MAKER(s):  [ ] Health Care Proxy(s)  [x ] Surrogate(s)  [ ] Guardian           Name(s): Phone Number(s):    BASELINE (I)ADL(s) (prior to admission):  [place]: [ ]Total  [ ] Moderate [ ]Dependent    Allergies    No Known Drug Allergies  shellfish (Nausea; Vomiting)    Intolerances    MEDICATIONS  (STANDING):  allopurinol 300 milliGRAM(s) Oral daily  cefTRIAXone   IVPB 1000 milliGRAM(s) IV Intermittent every 24 hours  imatinib 400 milliGRAM(s) Oral two times a day  levETIRAcetam 500 milliGRAM(s) Oral two times a day  lidocaine   Patch 1 Patch Transdermal every 24 hours  methotrexate PF IntraThecal (eMAR) 12 milliGRAM(s) IntraThecal once  pantoprazole    Tablet 40 milliGRAM(s) Oral before breakfast  polyethylene glycol 3350 17 Gram(s) Oral daily  senna 2 Tablet(s) Oral at bedtime  sodium chloride 0.9%. 500 milliLiter(s) (75 mL/Hr) IV Continuous <Continuous>  ursodiol Capsule 300 milliGRAM(s) Oral every 12 hours    MEDICATIONS  (PRN):  HYDROmorphone  Injectable 1 milliGRAM(s) IV Push every 4 hours PRN Severe Pain (7 - 10)  oxyCODONE    IR 5 milliGRAM(s) Oral every 6 hours PRN Moderate Pain (4 - 6)    PRESENT SYMPTOMS: [ ]Unable to obtain due to poor mentation   Source if other than patient:  [ ]Family   [ ]Team     Pain: [x ]yes [ ]no  QOL impact -  See above  Location -                    Aggravating factors -  Quality -  Radiation -  Timing-  Severity (0-10 scale):  Minimal acceptable level (0-10 scale):     PAIN AD Score:     http://geriatrictoolkit.[facility]/cog/painad.pdf (press ctrl +  left click to view)    Dyspnea:                           [ ]Mild [ ]Moderate [ ]Severe  Anxiety:                             [ ]Mild [ ]Moderate [ ]Severe  Fatigue:                             [ ]Mild [ ]Moderate [ ]Severe  Nausea:                             [ ]Mild [ ]Moderate [ ]Severe  Loss of appetite:              [ ]Mild [ ]Moderate [ ]Severe  Constipation:                    [x ]Mild [ ]Moderate [ ]Severe    Other Symptoms:  [x ]All other review of systems negative     Palliative Performance Status Version 2:      40   %    http://npcrc.org/files/news/palliative_performance_scale_ppsv2.pdf  PHYSICAL EXAM:  Vital Signs Last 24 Hrs  T(C): 37.1 (15 May 2020 13:45), Max: 37.1 (15 May 2020 09:45)  T(F): 98.8 (15 May 2020 13:45), Max: 98.8 (15 May 2020 09:45)  HR: 85 (15 May 2020 13:45) (74 - 85)  BP: 130/76 (15 May 2020 13:45) (109/68 - 131/83)  BP(mean): --  RR: 18 (15 May 2020 13:45) (18 - 20)  SpO2: 100% (15 May 2020 13:45) (98% - 100%) I&O's Summary    14 May 2020 07:01  -  15 May 2020 07:00  --------------------------------------------------------  IN: 1264 mL / OUT:  mL / NET: -761 mL    15 May 2020 07:01  -  15 May 2020 16:24  --------------------------------------------------------  IN: 743 mL / OUT: 700 mL / NET: 43 mL      GENERAL:  [x ]Alert  [x ]Oriented x  3  [ ]Lethargic  [ ]Cachexia  [ ]Unarousable  [ ]Verbal  [ ]Non-Verbal  Behavioral:   [ ] Anxiety  [ ] Delirium [ ] Agitation [x ] Other Calm  HEENT:  [ ]Normal   [ x]Dry mouth   [ ]ET Tube/Trach  [ ]Oral lesions  PULMONARY:   [  ]Clear [ ]Tachypnea  [ ]Audible excessive secretions   [ ]Rhonchi        [ ]Right [ ]Left [ ]Bilateral  [ ]Crackles        [ ]Right [ ]Left [ ]Bilateral  [ ]Wheezing     [ ]Right [ ]Left [ ]Bilatera  [x ]Diminished breath sounds [ ]right [ ]left [ ]bilateral  CARDIOVASCULAR:    [ x]Regular [ ]Irregular [ ]Tachy  [ ]Ellis [ ]Murmur [ ]Other  GASTROINTESTINAL:  [ x]Soft  [ x]Distended   [ ]+BS  [x ]Non tender [ ]Tender  [ ]PEG [ ]OGT/ NGT  Last BM:     GENITOURINARY:  [  Normal [ ] Incontinent   [ ]Oliguria/Anuria   [ ]Deng  MUSCULOSKELETAL:     ]Normal   [x ]Weakness  [ ]Bed/Wheelchair bound [ ]Edema back pain  NEUROLOGIC:   [ No focal deficits  [ ]Cognitive impairment  [ ]Dysphagia [ ]Dysarthria [ ]Paresis [ sx]Other paresthesias   SKIN:   [x ]Normal    [ ]Rash  [ ]Pressure ulcer(s)       Present on admission [ ]y [ ]n    CRITICAL CARE:  [ ] Shock Present  [ ]Septic [ ]Cardiogenic [ ]Neurologic [ ]Hypovolemic  [ ]  Vasopressors [ ]  Inotropes   [ ]Respiratory failure present [ ]Mechanical ventilation [ ]Non-invasive ventilatory support [ ]High flow  [ ]Acute  [ ]Chronic [ ]Hypoxic  [ ]Hypercarbic [ ]Other  [ ]Other organ failure     LABS:                        7.9    11.02 )-----------( 17       ( 15 May 2020 09:47 )             24.3   -15    134<L>  |  101  |  19  ----------------------------<  112<H>  5.1   |  24  |  0.97    Ca    9.2      15 May 2020 09:47  Phos  4.0     05-15  Mg     2.1     05-15    TPro  6.3  /  Alb  3.1<L>  /  TBili  0.2  /  DBili  <0.1  /  AST  21  /  ALT  23  /  AlkPhos  65  -15  PT/INR - ( 14 May 2020 10:29 )   PT: 10.4 sec;   INR: 0.91 ratio         PTT - ( 14 May 2020 10:29 )  PTT:25.4 sec      RADIOLOGY & ADDITIONAL STUDIES:    PROTEIN CALORIE MALNUTRITION PRESENT: [ ]mild [ ]moderate [ ]severe [ ]underweight [ ]morbid obesity  https://www.andeal.org/vault/2440/web/files/ONC/Table_Clinical%20Characteristics%20to%20Document%20Malnutrition-White%20JV%20et%20al%974773.pdf    Height (cm): 160.02 (20 @ 15:31)  Weight (kg): 104.3 (20 @ 15:31)  BMI (kg/m2): 40.7 (20 @ 15:31)    [ ]PPSV2 < or = to 30% [ ]significant weight loss  [ ]poor nutritional intake  [ ]anasarca     Albumin, Serum: 3.1 g/dL (05-15-20 @ 09:47)   [ ]Artificial Nutrition      REFERRALS:   [ ]Chaplaincy  [ ]Hospice  [ ]Child Life  [ ]Social Work  [ ]Case management [ ]Holistic Therapy     Goals of Care Document:

## 2020-05-15 NOTE — CONSULT NOTE ADULT - PROBLEM SELECTOR RECOMMENDATION 2
historical back pain, cites OA  new imaging findings and neurologic symptoms  ? relapse with spinal involvement in ddx  addition imaging forthcoming  will reevaluate pain regimen post follow up imaging [facility] vs oncologic origin  dilaudid IV 1mg q4h prn being used without incident  begin naloxone prn  avoid benzodiazepine and other respiratory depressant agents  She is unclear about which agents used in the past

## 2020-05-15 NOTE — PROGRESS NOTE ADULT - SUBJECTIVE AND OBJECTIVE BOX
[facility] Transplant Team                                                      Critical / Counseling Time Provided: 30 minutes                                                                                                                                                        Chief Complaint:     S: Patient seen and examined with [facility] Transplant Team:   Denies mouth / tongue / throat pain, dyspnea, cough, nausea, vomiting, diarrhea, abdominal pain     O: Vitals:   Vital Signs Last 24 Hrs  T(C): 37.1 (15 May 2020 09:45), Max: 37.1 (15 May 2020 09:45)  T(F): 98.8 (15 May 2020 09:45), Max: 98.8 (15 May 2020 09:45)  HR: 83 (15 May 2020 09:45) (66 - 83)  BP: 131/83 (15 May 2020 09:45) (106/71 - 131/83)  BP(mean): --  RR: 18 (15 May 2020 09:45) (18 - 20)  SpO2: 98% (15 May 2020 09:45) (98% - 99%)    Admit weight:   Daily     Daily     Intake / Output:   05-14 @ 07:01  -  05-15 @ 07:00  --------------------------------------------------------  IN: 1264 mL / OUT: 2025 mL / NET: -761 mL    05-15 @ 07:01  -  05-15 @ 11:13  --------------------------------------------------------  IN: 0 mL / OUT: 400 mL / NET: -400 mL          PE:   Oropharynx:   Oral Mucositis:                                                        Grade:   CVS:   Lungs:   Abdomen:  Extremities:   Gastric Mucositis:                                                  Grade:   Intestinal Mucositis:                                              Grade:   Skin:   TLC:   Neuro:   Pain:     Labs:   CBC Full  -  ( 15 May 2020 09:47 )  WBC Count : 11.02 K/uL  Hemoglobin : 7.9 g/dL  Hematocrit : 24.3 %  Platelet Count - Automated : 17 K/uL  Mean Cell Volume : 95.7 fl  Mean Cell Hemoglobin : 31.1 pg  Mean Cell Hemoglobin Concentration : 32.5 gm/dL  Auto Neutrophil # : 0.48 K/uL  Auto Lymphocyte # : 6.61 K/uL  Auto Monocyte # : 0.10 K/uL  Auto Eosinophil # : 0.00 K/uL  Auto Basophil # : 0.00 K/uL  Auto Neutrophil % : 3.5 %  Auto Lymphocyte % : 60.0 %  Auto Monocyte % : 0.9 %  Auto Eosinophil % : 0.0 %  Auto Basophil % : 0.0 %                          7.9    11.02 )-----------( 17       ( 15 May 2020 09:47 )             24.3     05-15    134<L>  |  101  |  19  ----------------------------<  112<H>  5.1   |  24  |  0.97    Ca    9.2      15 May 2020 09:47  Phos  4.0     05-15  Mg     2.1     05-15    TPro  6.3  /  Alb  3.1<L>  /  TBili  0.2  /  DBili  <0.1  /  AST  21  /  ALT  23  /  AlkPhos  65  05-15    PT/INR - ( 14 May 2020 10:29 )   PT: 10.4 sec;   INR: 0.91 ratio         PTT - ( 14 May 2020 10:29 )  PTT:25.4 sec  LIVER FUNCTIONS - ( 15 May 2020 09:47 )  Alb: 3.1 g/dL / Pro: 6.3 g/dL / ALK PHOS: 65 U/L / ALT: 23 U/L / AST: 21 U/L / GGT: x           Lactate Dehydrogenase, Serum: 141 U/L (05-15 @ 09:47)    Cultures:     Culture Results: urine  >100,000 CFU/ml Escherichia coli (05.13.20 @ 01:06)  -  Ceftriaxone: S <=1 Enterobacter, Citrobacter, and Serratia may develop resistance during prolonged therapy (05.13.20 @ 01:06)    Radiology:   EXAM:  MR BRAIN                        PROCEDURE DATE:  05/14/2020    IMPRESSION: New mild to moderate acute hydrocephalus present with associated transependymal flow of cerebral spinal fluid.  Abnormal signal involves the subarachnoid space and layering fluid is noted within the ventricular system. Differential considerations include meningitis or carcinomatosis, less likely subarachnoid hemorrhage.   Thickening of the pituitary stalk, lower hypothalamus, and optic chiasm is present suspicious for an underlying tumor, infection, or inflammatory process.  Abnormal signal involves the brainstem and portions of the cerebellum as described. Some considerations include a rhombencephalitis, chemotherapy/medication effect, PML, demyelination, versus underlying infectious, neoplastic, or inflammatory lesion.  Edema and swelling is present in the right frontal lobe surrounding the Ommaya reservoir catheter. This could reflect leaking of CSF around the catheter from the ventricular system given the new hydrocephalus, versus underlying infection or mass lesion.  A follow-up brain MRI with contrast is recommended for further workup.    EXAM:  MR SPINE CERVICAL                        PROCEDURE DATE:  05/14/2020    IMPRESSION: Motion limited exam.  Diffusely decreased signal within the visualized vertebral bodies. Possibilities include red marrow conversion or an infiltrative process.  Spondylosis with C5-C6 disc bulge/ridge producing mild cord flattening.  Foraminal narrowing the C5-C6 and C6-C7 levels.    EXAM:  MR SPINE LUMBAR WAW IC                        PROCEDURE DATE:  05/12/2020    IMPRESSION: Enhancing T2 prolongation involving the conus ends and enhancement involving the cauda equina as well.  Degenerative changes as described     EXAM:  CT REFORM SPINE L                        EXAM:  CT THORACIC SPINE                        PROCEDURE DATE:  05/12/2020    IMPRESSION:  No acute spinal pathology. Degenerative changes in the thoracic and lumbar spine as described above.  Groundglass nodule measuring 0.7 cm is noted in the left upper lobe. Follow-up noncontrast CT in 6 months is recommended to establish stability/resolution.    EXAM:  CT ABDOMEN AND PELVIS IC                        PROCEDURE DATE:  05/12/2020    IMPRESSION:   Mild splenomegaly.  Subtle gallstone.    EXAM:  CT BRAIN                        PROCEDURE DATE:  05/12/2020    IMPRESSION:   1.  No acute intracranial hemorrhage.   2.  Right frontal approach Ommaya reservoir catheter with tip in the third ventricle. Slightly increased size of bilateral lateral and third ventricles compared to prior exam.   3.  Question increased soft tissue prominence in the region of the hypothalamus/pituitary stalk. Consider MRI brain with contrast for further evaluation.    Meds:   Antimicrobials:   cefTRIAXone   IVPB 1000 milliGRAM(s) IV Intermittent every 24 hours      Heme / Onc:   imatinib 400 milliGRAM(s) Oral two times a day  methotrexate PF IntraThecal (eMAR) 12 milliGRAM(s) IntraThecal once      GI:  pantoprazole    Tablet 40 milliGRAM(s) Oral before breakfast  polyethylene glycol 3350 17 Gram(s) Oral daily  senna 2 Tablet(s) Oral at bedtime  ursodiol Capsule 300 milliGRAM(s) Oral every 12 hours      Cardiovascular:       Immunologic:       Other medications:   allopurinol 300 milliGRAM(s) Oral daily  levETIRAcetam 500 milliGRAM(s) Oral two times a day  lidocaine   Patch 1 Patch Transdermal every 24 hours  sodium chloride 0.9%. 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    1. Infectious Disease:   cefTRIAXone   IVPB 1000 milliGRAM(s) IV Intermittent every 24 hours    2. GI Prophylaxis:    pantoprazole    Tablet 40 milliGRAM(s) Oral before breakfast    3. Mouthcare ; Skin care     4. Transfuse & replete electrolytes prn     5. IV hydration, daily weights, strict I&O, prn diuresis     6. PO intake as tolerated, nutrition follow up as needed, MVI, folic acid     7. Antiemetics, anti-diarrhea medications:     8. OOB as tolerated, physical therapy consult if needed     9. Monitor coags / fibrinogen 2x week, vitamin K as needed     10. Monitor closely for clinical changes, monitor for fevers     11. Emotional support provided, plan of care discussed and questions addressed     I have written the above note for Dr. Balderas who performed service with me in the room.   Janett Barrow NP-C ([phone])    I have seen and examined patient with NP, I agree with above note as scribed. [facility] Transplant Team                                                      Critical / Counseling Time Provided: 30 minutes                                                                                                                                                        Chief Complaint:   persistent back pain, relapsed disease   S: Patient seen and examined with [facility] Team:   + back pain   + weakness     O: Vitals:   Vital Signs Last 24 Hrs  T(C): 37.1 (15 May 2020 09:45), Max: 37.1 (15 May 2020 09:45)  T(F): 98.8 (15 May 2020 09:45), Max: 98.8 (15 May 2020 09:45)  HR: 83 (15 May 2020 09:45) (66 - 83)  BP: 131/83 (15 May 2020 09:45) (106/71 - 131/83)  BP(mean): --  RR: 18 (15 May 2020 09:45) (18 - 20)  SpO2: 98% (15 May 2020 09:45) (98% - 99%)    Admit weight: 104.3kg   Today's weight:  104.3kg 5/12/20     Intake / Output:   05-14 @ 07:01  -  05-15 @ 07:00  --------------------------------------------------------  IN: 1264 mL / OUT: 2025 mL / NET: -761 mL    05-15 @ 07:01  -  05-15 @ 11:13  --------------------------------------------------------  IN: 0 mL / OUT: 400 mL / NET: -400 mL  PE:   General: appears in mild distress   CVS: S1, S2 RRR   Lungs: CTA throughout bilaterally   Abdomen: + BS x 4, soft, NT, ND   Extremities: no edema   Skin: no rash   Neuro: A&O x 3  Pain: chronic low back pain     Labs:   CBC Full  -  ( 15 May 2020 09:47 )  WBC Count : 11.02 K/uL  Hemoglobin : 7.9 g/dL  Hematocrit : 24.3 %  Platelet Count - Automated : 17 K/uL  Mean Cell Volume : 95.7 fl  Mean Cell Hemoglobin : 31.1 pg  Mean Cell Hemoglobin Concentration : 32.5 gm/dL  Auto Neutrophil # : 0.48 K/uL  Auto Lymphocyte # : 6.61 K/uL  Auto Monocyte # : 0.10 K/uL  Auto Eosinophil # : 0.00 K/uL  Auto Basophil # : 0.00 K/uL  Auto Neutrophil % : 3.5 %  Auto Lymphocyte % : 60.0 %  Auto Monocyte % : 0.9 %  Auto Eosinophil % : 0.0 %  Auto Basophil % : 0.0 %                          7.9    11.02 )-----------( 17       ( 15 May 2020 09:47 )             24.3     05-15    134<L>  |  101  |  19  ----------------------------<  112<H>  5.1   |  24  |  0.97    Ca    9.2      15 May 2020 09:47  Phos  4.0     05-15  Mg     2.1     05-15    TPro  6.3  /  Alb  3.1<L>  /  TBili  0.2  /  DBili  <0.1  /  AST  21  /  ALT  23  /  AlkPhos  65  05-15    PT/INR - ( 14 May 2020 10:29 )   PT: 10.4 sec;   INR: 0.91 ratio         PTT - ( 14 May 2020 10:29 )  PTT:25.4 sec  LIVER FUNCTIONS - ( 15 May 2020 09:47 )  Alb: 3.1 g/dL / Pro: 6.3 g/dL / ALK PHOS: 65 U/L / ALT: 23 U/L / AST: 21 U/L / GGT: x           Lactate Dehydrogenase, Serum: 141 U/L (05-15 @ 09:47)    Cultures:     Culture Results: urine  >100,000 CFU/ml Escherichia coli (05.13.20 @ 01:06)  -  Ceftriaxone: S <=1 Enterobacter, Citrobacter, and Serratia may develop resistance during prolonged therapy (05.13.20 @ 01:06)    Radiology:   EXAM:  MR BRAIN                        PROCEDURE DATE:  05/14/2020    IMPRESSION: New mild to moderate acute hydrocephalus present with associated transependymal flow of cerebral spinal fluid.  Abnormal signal involves the subarachnoid space and layering fluid is noted within the ventricular system. Differential considerations include meningitis or carcinomatosis, less likely subarachnoid hemorrhage.   Thickening of the pituitary stalk, lower hypothalamus, and optic chiasm is present suspicious for an underlying tumor, infection, or inflammatory process.  Abnormal signal involves the brainstem and portions of the cerebellum as described. Some considerations include a rhombencephalitis, chemotherapy/medication effect, PML, demyelination, versus underlying infectious, neoplastic, or inflammatory lesion.  Edema and swelling is present in the right frontal lobe surrounding the Ommaya reservoir catheter. This could reflect leaking of CSF around the catheter from the ventricular system given the new hydrocephalus, versus underlying infection or mass lesion.  A follow-up brain MRI with contrast is recommended for further workup.    EXAM:  MR SPINE CERVICAL                        PROCEDURE DATE:  05/14/2020    IMPRESSION: Motion limited exam.  Diffusely decreased signal within the visualized vertebral bodies. Possibilities include red marrow conversion or an infiltrative process.  Spondylosis with C5-C6 disc bulge/ridge producing mild cord flattening.  Foraminal narrowing the C5-C6 and C6-C7 levels.    EXAM:  MR SPINE LUMBAR WAW IC                        PROCEDURE DATE:  05/12/2020    IMPRESSION: Enhancing T2 prolongation involving the conus ends and enhancement involving the cauda equina as well.  Degenerative changes as described     EXAM:  CT REFORM SPINE L                        EXAM:  CT THORACIC SPINE                        PROCEDURE DATE:  05/12/2020    IMPRESSION:  No acute spinal pathology. Degenerative changes in the thoracic and lumbar spine as described above.  Groundglass nodule measuring 0.7 cm is noted in the left upper lobe. Follow-up noncontrast CT in 6 months is recommended to establish stability/resolution.    EXAM:  CT ABDOMEN AND PELVIS IC                        PROCEDURE DATE:  05/12/2020    IMPRESSION:   Mild splenomegaly.  Subtle gallstone.    EXAM:  CT BRAIN                        PROCEDURE DATE:  05/12/2020    IMPRESSION:   1.  No acute intracranial hemorrhage.   2.  Right frontal approach Ommaya reservoir catheter with tip in the third ventricle. Slightly increased size of bilateral lateral and third ventricles compared to prior exam.   3.  Question increased soft tissue prominence in the region of the hypothalamus/pituitary stalk. Consider MRI brain with contrast for further evaluation.    Meds:   Antimicrobials:   cefTRIAXone   IVPB 1000 milliGRAM(s) IV Intermittent every 24 hours      Heme / Onc:   imatinib 400 milliGRAM(s) Oral two times a day  methotrexate PF IntraThecal (eMAR) 12 milliGRAM(s) IntraThecal once      GI:  pantoprazole    Tablet 40 milliGRAM(s) Oral before breakfast  polyethylene glycol 3350 17 Gram(s) Oral daily  senna 2 Tablet(s) Oral at bedtime  ursodiol Capsule 300 milliGRAM(s) Oral every 12 hours      Cardiovascular:       Immunologic:       Other medications:   allopurinol 300 milliGRAM(s) Oral daily  levETIRAcetam 500 milliGRAM(s) Oral two times a day  lidocaine   Patch 1 Patch Transdermal every 24 hours  sodium chloride 0.9%. 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    1. Infectious Disease:   cefTRIAXone   IVPB 1000 milliGRAM(s) IV Intermittent every 24 hours    2. GI Prophylaxis:    pantoprazole    Tablet 40 milliGRAM(s) Oral before breakfast    3. Mouthcare ; Skin care     4. Transfuse & replete electrolytes prn     5. IV hydration, daily weights, strict I&O, prn diuresis     6. PO intake as tolerated, nutrition follow up as needed, MVI, folic acid     7. Antiemetics, anti-diarrhea medications:     8. OOB as tolerated, physical therapy consult if needed     9. Monitor coags / fibrinogen 2x week, vitamin K as needed     10. Monitor closely for clinical changes, monitor for fevers     11. Emotional support provided, plan of care discussed and questions addressed     I have written the above note for Dr. Balderas who performed service with me in the room.   Janett Barrow NP-C ([phone])    I have seen and examined patient with NP, I agree with above note as scribed.

## 2020-05-16 LAB
ALBUMIN SERPL ELPH-MCNC: 3.3 G/DL — SIGNIFICANT CHANGE UP (ref 3.3–5)
ALP SERPL-CCNC: 69 U/L — SIGNIFICANT CHANGE UP (ref 40–120)
ALT FLD-CCNC: 25 U/L — SIGNIFICANT CHANGE UP (ref 10–45)
ANION GAP SERPL CALC-SCNC: 12 MMOL/L — SIGNIFICANT CHANGE UP (ref 5–17)
AST SERPL-CCNC: 23 U/L — SIGNIFICANT CHANGE UP (ref 10–40)
BASOPHILS # BLD AUTO: 0 K/UL — SIGNIFICANT CHANGE UP (ref 0–0.2)
BASOPHILS NFR BLD AUTO: 0 % — SIGNIFICANT CHANGE UP (ref 0–2)
BILIRUB SERPL-MCNC: 0.3 MG/DL — SIGNIFICANT CHANGE UP (ref 0.2–1.2)
BLASTS # FLD: 29 % — HIGH (ref 0–0)
BUN SERPL-MCNC: 24 MG/DL — HIGH (ref 7–23)
CALCIUM SERPL-MCNC: 9.3 MG/DL — SIGNIFICANT CHANGE UP (ref 8.4–10.5)
CHLORIDE SERPL-SCNC: 100 MMOL/L — SIGNIFICANT CHANGE UP (ref 96–108)
CO2 SERPL-SCNC: 22 MMOL/L — SIGNIFICANT CHANGE UP (ref 22–31)
CREAT SERPL-MCNC: 1.12 MG/DL — SIGNIFICANT CHANGE UP (ref 0.5–1.3)
EOSINOPHIL # BLD AUTO: 0 K/UL — SIGNIFICANT CHANGE UP (ref 0–0.5)
EOSINOPHIL NFR BLD AUTO: 0 % — SIGNIFICANT CHANGE UP (ref 0–6)
GLUCOSE SERPL-MCNC: 127 MG/DL — HIGH (ref 70–99)
HCT VFR BLD CALC: 23 % — LOW (ref 34.5–45)
HGB BLD-MCNC: 7.4 G/DL — LOW (ref 11.5–15.5)
LDH SERPL L TO P-CCNC: 150 U/L — SIGNIFICANT CHANGE UP (ref 50–242)
LYMPHOCYTES # BLD AUTO: 3.12 K/UL — SIGNIFICANT CHANGE UP (ref 1–3.3)
LYMPHOCYTES # BLD AUTO: 61 % — HIGH (ref 13–44)
MAGNESIUM SERPL-MCNC: 2.2 MG/DL — SIGNIFICANT CHANGE UP (ref 1.6–2.6)
MANUAL SMEAR VERIFICATION: SIGNIFICANT CHANGE UP
MCHC RBC-ENTMCNC: 30.8 PG — SIGNIFICANT CHANGE UP (ref 27–34)
MCHC RBC-ENTMCNC: 32.2 GM/DL — SIGNIFICANT CHANGE UP (ref 32–36)
MCV RBC AUTO: 95.8 FL — SIGNIFICANT CHANGE UP (ref 80–100)
MONOCYTES # BLD AUTO: 0.1 K/UL — SIGNIFICANT CHANGE UP (ref 0–0.9)
MONOCYTES NFR BLD AUTO: 2 % — SIGNIFICANT CHANGE UP (ref 2–14)
NEUTROPHILS # BLD AUTO: 0.41 K/UL — LOW (ref 1.8–7.4)
NEUTROPHILS NFR BLD AUTO: 7 % — LOW (ref 43–77)
NEUTS BAND # BLD: 1 % — SIGNIFICANT CHANGE UP (ref 0–8)
NRBC # BLD: 1 /100 — HIGH (ref 0–0)
PHOSPHATE SERPL-MCNC: 4.3 MG/DL — SIGNIFICANT CHANGE UP (ref 2.5–4.5)
PLAT MORPH BLD: NORMAL — SIGNIFICANT CHANGE UP
PLATELET # BLD AUTO: 12 K/UL — CRITICAL LOW (ref 150–400)
POTASSIUM SERPL-MCNC: 5.2 MMOL/L — SIGNIFICANT CHANGE UP (ref 3.5–5.3)
POTASSIUM SERPL-SCNC: 5.2 MMOL/L — SIGNIFICANT CHANGE UP (ref 3.5–5.3)
PROT SERPL-MCNC: 6.5 G/DL — SIGNIFICANT CHANGE UP (ref 6–8.3)
RBC # BLD: 2.4 M/UL — LOW (ref 3.8–5.2)
RBC # FLD: 16.3 % — HIGH (ref 10.3–14.5)
RBC BLD AUTO: SIGNIFICANT CHANGE UP
SARS-COV-2 RNA SPEC QL NAA+PROBE: SIGNIFICANT CHANGE UP
SODIUM SERPL-SCNC: 134 MMOL/L — LOW (ref 135–145)
URATE SERPL-MCNC: 4.7 MG/DL — SIGNIFICANT CHANGE UP (ref 2.5–7)
WBC # BLD: 5.12 K/UL — SIGNIFICANT CHANGE UP (ref 3.8–10.5)
WBC # FLD AUTO: 5.12 K/UL — SIGNIFICANT CHANGE UP (ref 3.8–10.5)

## 2020-05-16 PROCEDURE — 99232 SBSQ HOSP IP/OBS MODERATE 35: CPT | Mod: GC

## 2020-05-16 RX ORDER — HYDROMORPHONE HYDROCHLORIDE 2 MG/ML
0.5 INJECTION INTRAMUSCULAR; INTRAVENOUS; SUBCUTANEOUS ONCE
Refills: 0 | Status: DISCONTINUED | OUTPATIENT
Start: 2020-05-16 | End: 2020-05-16

## 2020-05-16 RX ORDER — HYDROMORPHONE HYDROCHLORIDE 2 MG/ML
1 INJECTION INTRAMUSCULAR; INTRAVENOUS; SUBCUTANEOUS ONCE
Refills: 0 | Status: DISCONTINUED | OUTPATIENT
Start: 2020-05-16 | End: 2020-05-16

## 2020-05-16 RX ORDER — HYDROMORPHONE HYDROCHLORIDE 2 MG/ML
1 INJECTION INTRAMUSCULAR; INTRAVENOUS; SUBCUTANEOUS
Refills: 0 | Status: DISCONTINUED | OUTPATIENT
Start: 2020-05-16 | End: 2020-05-18

## 2020-05-16 RX ADMIN — HYDROMORPHONE HYDROCHLORIDE 1 MILLIGRAM(S): 2 INJECTION INTRAMUSCULAR; INTRAVENOUS; SUBCUTANEOUS at 06:21

## 2020-05-16 RX ADMIN — HYDROMORPHONE HYDROCHLORIDE 1 MILLIGRAM(S): 2 INJECTION INTRAMUSCULAR; INTRAVENOUS; SUBCUTANEOUS at 09:20

## 2020-05-16 RX ADMIN — LEVETIRACETAM 500 MILLIGRAM(S): 250 TABLET, FILM COATED ORAL at 17:17

## 2020-05-16 RX ADMIN — HYDROMORPHONE HYDROCHLORIDE 0.5 MILLIGRAM(S): 2 INJECTION INTRAMUSCULAR; INTRAVENOUS; SUBCUTANEOUS at 14:10

## 2020-05-16 RX ADMIN — HYDROMORPHONE HYDROCHLORIDE 1 MILLIGRAM(S): 2 INJECTION INTRAMUSCULAR; INTRAVENOUS; SUBCUTANEOUS at 02:28

## 2020-05-16 RX ADMIN — IMATINIB MESYLATE 400 MILLIGRAM(S): 400 TABLET, FILM COATED ORAL at 17:17

## 2020-05-16 RX ADMIN — Medication 300 MILLIGRAM(S): at 12:34

## 2020-05-16 RX ADMIN — HYDROMORPHONE HYDROCHLORIDE 1 MILLIGRAM(S): 2 INJECTION INTRAMUSCULAR; INTRAVENOUS; SUBCUTANEOUS at 19:50

## 2020-05-16 RX ADMIN — URSODIOL 300 MILLIGRAM(S): 250 TABLET, FILM COATED ORAL at 17:17

## 2020-05-16 RX ADMIN — OXYCODONE HYDROCHLORIDE 5 MILLIGRAM(S): 5 TABLET ORAL at 08:10

## 2020-05-16 RX ADMIN — SENNA PLUS 2 TABLET(S): 8.6 TABLET ORAL at 22:57

## 2020-05-16 RX ADMIN — POLYETHYLENE GLYCOL 3350 17 GRAM(S): 17 POWDER, FOR SOLUTION ORAL at 12:34

## 2020-05-16 RX ADMIN — URSODIOL 300 MILLIGRAM(S): 250 TABLET, FILM COATED ORAL at 05:14

## 2020-05-16 RX ADMIN — HYDROMORPHONE HYDROCHLORIDE 1 MILLIGRAM(S): 2 INJECTION INTRAMUSCULAR; INTRAVENOUS; SUBCUTANEOUS at 22:58

## 2020-05-16 RX ADMIN — HYDROMORPHONE HYDROCHLORIDE 1 MILLIGRAM(S): 2 INJECTION INTRAMUSCULAR; INTRAVENOUS; SUBCUTANEOUS at 16:32

## 2020-05-16 RX ADMIN — LEVETIRACETAM 500 MILLIGRAM(S): 250 TABLET, FILM COATED ORAL at 05:14

## 2020-05-16 RX ADMIN — CEFTRIAXONE 100 MILLIGRAM(S): 500 INJECTION, POWDER, FOR SOLUTION INTRAMUSCULAR; INTRAVENOUS at 22:58

## 2020-05-16 RX ADMIN — IMATINIB MESYLATE 400 MILLIGRAM(S): 400 TABLET, FILM COATED ORAL at 05:14

## 2020-05-16 RX ADMIN — HYDROMORPHONE HYDROCHLORIDE 0.5 MILLIGRAM(S): 2 INJECTION INTRAMUSCULAR; INTRAVENOUS; SUBCUTANEOUS at 00:55

## 2020-05-16 RX ADMIN — PANTOPRAZOLE SODIUM 40 MILLIGRAM(S): 20 TABLET, DELAYED RELEASE ORAL at 05:14

## 2020-05-16 RX ADMIN — HYDROMORPHONE HYDROCHLORIDE 1 MILLIGRAM(S): 2 INJECTION INTRAMUSCULAR; INTRAVENOUS; SUBCUTANEOUS at 11:20

## 2020-05-16 NOTE — PROGRESS NOTE ADULT - PROBLEM SELECTOR PLAN 1
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 Bentley  - F/u FISH for Y, peripheral flow cytometry, and BCR-ABL  - continue IVF  - check CBC with DIFF, CMP, LDH, uric acid daily   - transfuse for hgb<7 and/or platelets <10K  Elitek prn for elevated uric acid  [facility] - send CSF for flow, instill IT chemo  Bone marrow bx 5/15/20

## 2020-05-16 NOTE — PROGRESS NOTE ADULT - ATTENDING COMMENTS
60y F pmhx of Ph+ ALL, hx of SDH, s/p haploSCT 2/13/19, with chronic arthritic back pain p/w acute on chronic low back pain and profound b/l LE weakness, found with leukocytosis and cytopenia concerning for relapse ALL, admitted for further evaluation and management    -d/c'd Hydrea 1gm q12 hr, restarted Gleevec. Cont IV fluids  -monitor for tumor lysis syndrome. On Allopurinol, given Elitek uric acid 8.4 on 5/13  -pt has Sree -will discuss MRI results with neurosurgery before tap for analysis and IT chemo  -repeat BMbx  -send mutation analysis including T315I and consider Ponatinib  -UTI on admission -started on Ceftriaxone. COVID19 neg on 5/12  -palliative care consult 60y F pmhx of Ph+ ALL, hx of SDH, s/p haploSCT 2/13/19, with chronic arthritic back pain p/w acute on chronic low back pain and profound b/l LE weakness, found with leukocytosis and cytopenia concerning for relapse ALL, admitted for further evaluation and management    -d/c'd Hydrea 1gm q12 hr, restarted Gleevec. Cont IV fluids  -monitor for tumor lysis syndrome. On Allopurinol,  -pt has Ommaya s/p IT Mtx given on 5/15 -showed 19% 'other' cells likely due to CSF involvement, f/u flow cytometry. Plan 2x/wk IT chemo, on Tue/Friday's  -BM bx done on 5/15 -f/u results,   -send mutation analysis including T315I and consider Ponatinib  -UTI on admission -started on Ceftriaxone. COVID19 neg on 5/12  -palliative care consult

## 2020-05-16 NOTE — PROGRESS NOTE ADULT - SUBJECTIVE AND OBJECTIVE BOX
HPC Transplant Team                                                      Critical / Counseling Time Provided: 30 minutes                                                                                                                                                        Chief Complaint:     S: Patient seen and examined with HPC Transplant Team:   Denies mouth / tongue / throat pain, dyspnea, cough, nausea, vomiting, diarrhea, abdominal pain     O: Vitals:   Vital Signs Last 24 Hrs  T(C): 37.5 (16 May 2020 06:25), Max: 37.5 (16 May 2020 06:25)  T(F): 99.5 (16 May 2020 06:25), Max: 99.5 (16 May 2020 06:25)  HR: 89 (16 May 2020 06:25) (83 - 93)  BP: 113/71 (16 May 2020 06:25) (100/67 - 131/83)  BP(mean): --  RR: 18 (16 May 2020 06:25) (18 - 18)  SpO2: 98% (16 May 2020 06:25) (98% - 100%)    Admit weight:   Daily     Daily Weight in k (15 May 2020 11:29)    Intake / Output:   -15 @ 07:01  -  -16 @ 07:00  --------------------------------------------------------  IN: 2332 mL / OUT: 1700 mL / NET: 632 mL          PE:   Oropharynx:   Oral Mucositis:                                                        Grade:   CVS:   Lungs:   Abdomen:  Extremities:   Gastric Mucositis:                                                  Grade:   Intestinal Mucositis:                                              Grade:   Skin:   TLC:   Neuro:   Pain:     Labs:   CBC Full  -  ( 15 May 2020 09:47 )  WBC Count : 11.02 K/uL  Hemoglobin : 7.9 g/dL  Hematocrit : 24.3 %  Platelet Count - Automated : 17 K/uL  Mean Cell Volume : 95.7 fl  Mean Cell Hemoglobin : 31.1 pg  Mean Cell Hemoglobin Concentration : 32.5 gm/dL  Auto Neutrophil # : 0.48 K/uL  Auto Lymphocyte # : 6.61 K/uL  Auto Monocyte # : 0.10 K/uL  Auto Eosinophil # : 0.00 K/uL  Auto Basophil # : 0.00 K/uL  Auto Neutrophil % : 3.5 %  Auto Lymphocyte % : 60.0 %  Auto Monocyte % : 0.9 %  Auto Eosinophil % : 0.0 %  Auto Basophil % : 0.0 %                          7.9    11.02 )-----------( 17       ( 15 May 2020 09:47 )             24.3     -15    134<L>  |  101  |  19  ----------------------------<  112<H>  5.1   |  24  |  0.97    Ca    9.2      15 May 2020 09:47  Phos  4.0     -15  Mg     2.1     -15    TPro  6.3  /  Alb  3.1<L>  /  TBili  0.2  /  DBili  <0.1  /  AST  21  /  ALT  23  /  AlkPhos  65  05-15    PT/INR - ( 14 May 2020 10:29 )   PT: 10.4 sec;   INR: 0.91 ratio         PTT - ( 14 May 2020 10:29 )  PTT:25.4 sec  LIVER FUNCTIONS - ( 15 May 2020 09:47 )  Alb: 3.1 g/dL / Pro: 6.3 g/dL / ALK PHOS: 65 U/L / ALT: 23 U/L / AST: 21 U/L / GGT: x           Lactate Dehydrogenase, Serum: 141 U/L (05-15 @ 09:47)          Karnofsky / Lansky Scale:   GVHD:   Skin:   Liver:   Gut:   Overall Grade:       Cultures:         Radiology:       Meds:   Antimicrobials:   cefTRIAXone   IVPB 1000 milliGRAM(s) IV Intermittent every 24 hours      Heme / Onc:   imatinib 400 milliGRAM(s) Oral two times a day  methotrexate PF IntraThecal (eMAR) 12 milliGRAM(s) IntraThecal once      GI:  pantoprazole    Tablet 40 milliGRAM(s) Oral before breakfast  polyethylene glycol 3350 17 Gram(s) Oral daily  senna 2 Tablet(s) Oral at bedtime  ursodiol Capsule 300 milliGRAM(s) Oral every 12 hours      Cardiovascular:       Immunologic:       Other medications:   allopurinol 300 milliGRAM(s) Oral daily  levETIRAcetam 500 milliGRAM(s) Oral two times a day  lidocaine   Patch 1 Patch Transdermal every 24 hours  sodium chloride 0.9%. 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    1. Infectious Disease:   cefTRIAXone   IVPB 1000 milliGRAM(s) IV Intermittent every 24 hours    2. GI Prophylaxis:    pantoprazole    Tablet 40 milliGRAM(s) Oral before breakfast    3. Mouthcare ; Skin care     4. Transfuse & replete electrolytes prn     5. IV hydration, daily weights, strict I&O, prn diuresis     6. PO intake as tolerated, nutrition follow up as needed, MVI, folic acid     7. Antiemetics, anti-diarrhea medications:     8. OOB as tolerated, physical therapy consult if needed     9. Monitor coags / fibrinogen 2x week, vitamin K as needed     10. Monitor closely for clinical changes, monitor for fevers     11. Emotional support provided, plan of care discussed and questions addressed     I have written the above note for Dr. Herrera who performed service with me in the room.   Ean Hope PA-C ([phone])    I have seen and examined patient with PA, I agree with above note as scribed. [facility] Transplant Team                                                      Critical / Counseling Time Provided: 30 minutes                                                                                                                                                        Chief Complaint:   persistent back pain, relapsed disease   S: Patient seen and examined with HPC Transplant Team:   + back pain   + weakness     S: Patient seen and examined with HPC Transplant Team:   Denies mouth / tongue / throat pain, dyspnea, cough, nausea, vomiting, diarrhea, abdominal pain     O: Vitals:   Vital Signs Last 24 Hrs  T(C): 37.5 (16 May 2020 06:25), Max: 37.5 (16 May 2020 06:25)  T(F): 99.5 (16 May 2020 06:25), Max: 99.5 (16 May 2020 06:25)  HR: 89 (16 May 2020 06:25) (83 - 93)  BP: 113/71 (16 May 2020 06:25) (100/67 - 131/83)  RR: 18 (16 May 2020 06:25) (18 - 18)  SpO2: 98% (16 May 2020 06:25) (98% - 100%)    Admit weight: 104.3 kg  Daily     Daily Weight in k (15 May 2020 11:29)    Intake / Output:   -15 @ 07:01  -  16 @ 07:00  --------------------------------------------------------  IN: 2332 mL / OUT: 1700 mL / NET: 632 mL    PE:   General: appears in mild distress   CVS: S1, S2 RRR   Lungs: CTA throughout bilaterally   Abdomen: + BS x 4, soft, NT, ND   Extremities: no edema   Skin: no rash   Neuro: A&O x 3  Pain: chronic low back pain     Labs:   CBC Full  -  ( 15 May 2020 09:47 )  WBC Count : 11.02 K/uL  Hemoglobin : 7.9 g/dL  Hematocrit : 24.3 %  Platelet Count - Automated : 17 K/uL  Mean Cell Volume : 95.7 fl  Mean Cell Hemoglobin : 31.1 pg  Mean Cell Hemoglobin Concentration : 32.5 gm/dL  Auto Neutrophil # : 0.48 K/uL  Auto Lymphocyte # : 6.61 K/uL  Auto Monocyte # : 0.10 K/uL  Auto Eosinophil # : 0.00 K/uL  Auto Basophil # : 0.00 K/uL  Auto Neutrophil % : 3.5 %  Auto Lymphocyte % : 60.0 %  Auto Monocyte % : 0.9 %  Auto Eosinophil % : 0.0 %  Auto Basophil % : 0.0 %                          7.9    11.02 )-----------( 17       ( 15 May 2020 09:47 )             24.3     05-15    134<L>  |  101  |  19  ----------------------------<  112<H>  5.1   |  24  |  0.97    Ca    9.2      15 May 2020 09:47  Phos  4.0     -15  Mg     2.1     -15    TPro  6.3  /  Alb  3.1<L>  /  TBili  0.2  /  DBili  <0.1  /  AST  21  /  ALT  23  /  AlkPhos  65  -15    PT/INR - ( 14 May 2020 10:29 )   PT: 10.4 sec;   INR: 0.91 ratio    PTT - ( 14 May 2020 10:29 )  PTT:25.4 sec    LIVER FUNCTIONS - ( 15 May 2020 09:47 )  Alb: 3.1 g/dL / Pro: 6.3 g/dL / ALK PHOS: 65 U/L / ALT: 23 U/L / AST: 21 U/L / GGT: x           Lactate Dehydrogenase, Serum: 141 U/L (05-15 @ 09:47)      Cultures:   Culture - CSF with Gram Stain . (05.15.20 @ 16:46)    Gram Stain:   polymorphonuclear leukocytes seen  No organisms seen  by cytocentrifuge    Specimen Source: .CSF CSF    Culture - Urine (20 @ 01:06)    -  Amikacin: S <=16    -  Cefazolin: S <=8 (MIC_CL_COM_[email or web address]) For uncomplicated UTI with K. pneumoniae, E. coli, or P. mirablis: DL <=16 is sensitive and DL >=32 is resistant. This also predicts results for oral agents cefaclor, cefdinir, cefpodoxime, cefprozil, cefuroxime axetil, cephalexin and locarbef for uncomplicated UTI. Note that some isolates may be susceptible to these agents while testing resistant to cefazolin.    -  Cefepime: S <=4    -  Cefoxitin: S <=8    -  Ampicillin: S <=8 These ampicillin results predict results for amoxicillin    -  Aztreonam: S <=4    -  Trimethoprim/Sulfamethoxazole: S <=2/38    -  Piperacillin/Tazobactam: S <=16    -  Imipenem: S <=1    -  Tigecycline: S <=2    -  Tobramycin: S <=4    -  Ciprofloxacin: S <=1    -  Gentamicin: S <=4    -  Levofloxacin: S <=2    -  Meropenem: S <=1    -  Nitrofurantoin: S <=32 Should not be used to treat pyelonephritis    -  Ampicillin/Sulbactam: S <=8/4 Enterobacter, Citrobacter, and Serratia may develop resistance during prolonged therapy (3-4 days)    -  Ceftriaxone: S <=1 Enterobacter, Citrobacter, and Serratia may develop resistance during prolonged therapy    Specimen Source: .Urine Clean Catch (Midstream)    Culture Results:   >100,000 CFU/ml Escherichia coli    Organism Identification: Escherichia coli    Organism: Escherichia coli    Method Type: DL      Radiology:     from: MR Thoracic Spine w/wo IV Cont (05.15.20 @ 20:13) >  IMPRESSION: Nonspecific enhancement of the tip of the conus similar to the appearance of the MRI of the lumbar spine 2020. Differential diagnosis includes infectious, inflammatory and neoplastic processes.      Meds:   Antimicrobials:   cefTRIAXone   IVPB 1000 milliGRAM(s) IV Intermittent every 24 hours      Heme / Onc:   imatinib 400 milliGRAM(s) Oral two times a day  methotrexate PF IntraThecal (eMAR) 12 milliGRAM(s) IntraThecal once      GI:  pantoprazole    Tablet 40 milliGRAM(s) Oral before breakfast  polyethylene glycol 3350 17 Gram(s) Oral daily  senna 2 Tablet(s) Oral at bedtime  ursodiol Capsule 300 milliGRAM(s) Oral every 12 hours      Other medications:   allopurinol 300 milliGRAM(s) Oral daily  levETIRAcetam 500 milliGRAM(s) Oral two times a day  lidocaine   Patch 1 Patch Transdermal every 24 hours  sodium chloride 0.9%. 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    1. Infectious Disease:   cefTRIAXone   IVPB 1000 milliGRAM(s) IV Intermittent every 24 hours    2. GI Prophylaxis:    pantoprazole    Tablet 40 milliGRAM(s) Oral before breakfast    3. Mouthcare ; Skin care     4. Transfuse & replete electrolytes prn     5. IV hydration, daily weights, strict I&O, prn diuresis     6. PO intake as tolerated, nutrition follow up as needed, MVI, folic acid     7. Antiemetics, anti-diarrhea medications:     8. OOB as tolerated, physical therapy consult if needed     9. Monitor coags / fibrinogen 2x week, vitamin K as needed     10. Monitor closely for clinical changes, monitor for fevers     11. Emotional support provided, plan of care discussed and questions addressed     I have written the above note for Dr. Herrera who performed service with me in the room.   Ean Hope PA-C ([phone])    I have seen and examined patient with PA, I agree with above note as scribed.

## 2020-05-17 LAB
ALBUMIN SERPL ELPH-MCNC: 3.1 G/DL — LOW (ref 3.3–5)
ALP SERPL-CCNC: 67 U/L — SIGNIFICANT CHANGE UP (ref 40–120)
ALT FLD-CCNC: 27 U/L — SIGNIFICANT CHANGE UP (ref 10–45)
ANION GAP SERPL CALC-SCNC: 13 MMOL/L — SIGNIFICANT CHANGE UP (ref 5–17)
AST SERPL-CCNC: 31 U/L — SIGNIFICANT CHANGE UP (ref 10–40)
BASOPHILS # BLD AUTO: 0 K/UL — SIGNIFICANT CHANGE UP (ref 0–0.2)
BASOPHILS NFR BLD AUTO: 0 % — SIGNIFICANT CHANGE UP (ref 0–2)
BILIRUB SERPL-MCNC: 0.3 MG/DL — SIGNIFICANT CHANGE UP (ref 0.2–1.2)
BLASTS # FLD: 4 % — HIGH (ref 0–0)
BUN SERPL-MCNC: 24 MG/DL — HIGH (ref 7–23)
CALCIUM SERPL-MCNC: 9.1 MG/DL — SIGNIFICANT CHANGE UP (ref 8.4–10.5)
CHLORIDE SERPL-SCNC: 105 MMOL/L — SIGNIFICANT CHANGE UP (ref 96–108)
CO2 SERPL-SCNC: 18 MMOL/L — LOW (ref 22–31)
CREAT SERPL-MCNC: 0.95 MG/DL — SIGNIFICANT CHANGE UP (ref 0.5–1.3)
EOSINOPHIL # BLD AUTO: 0.03 K/UL — SIGNIFICANT CHANGE UP (ref 0–0.5)
EOSINOPHIL NFR BLD AUTO: 1 % — SIGNIFICANT CHANGE UP (ref 0–6)
GLUCOSE SERPL-MCNC: 137 MG/DL — HIGH (ref 70–99)
HCT VFR BLD CALC: 19 % — CRITICAL LOW (ref 34.5–45)
HGB BLD-MCNC: 6.1 G/DL — CRITICAL LOW (ref 11.5–15.5)
LDH SERPL L TO P-CCNC: 200 U/L — SIGNIFICANT CHANGE UP (ref 50–242)
LYMPHOCYTES # BLD AUTO: 2.52 K/UL — SIGNIFICANT CHANGE UP (ref 1–3.3)
LYMPHOCYTES # BLD AUTO: 81 % — HIGH (ref 13–44)
MAGNESIUM SERPL-MCNC: 2.1 MG/DL — SIGNIFICANT CHANGE UP (ref 1.6–2.6)
MANUAL SMEAR VERIFICATION: SIGNIFICANT CHANGE UP
MCHC RBC-ENTMCNC: 31 PG — SIGNIFICANT CHANGE UP (ref 27–34)
MCHC RBC-ENTMCNC: 32.1 GM/DL — SIGNIFICANT CHANGE UP (ref 32–36)
MCV RBC AUTO: 96.4 FL — SIGNIFICANT CHANGE UP (ref 80–100)
MONOCYTES # BLD AUTO: 0.03 K/UL — SIGNIFICANT CHANGE UP (ref 0–0.9)
MONOCYTES NFR BLD AUTO: 1 % — LOW (ref 2–14)
NEUTROPHILS # BLD AUTO: 0.4 K/UL — LOW (ref 1.8–7.4)
NEUTROPHILS NFR BLD AUTO: 13 % — LOW (ref 43–77)
NRBC # BLD: 0 /100 — SIGNIFICANT CHANGE UP (ref 0–0)
PHOSPHATE SERPL-MCNC: 3.6 MG/DL — SIGNIFICANT CHANGE UP (ref 2.5–4.5)
PLAT MORPH BLD: NORMAL — SIGNIFICANT CHANGE UP
PLATELET # BLD AUTO: 8 K/UL — CRITICAL LOW (ref 150–400)
POTASSIUM SERPL-MCNC: 4.7 MMOL/L — SIGNIFICANT CHANGE UP (ref 3.5–5.3)
POTASSIUM SERPL-SCNC: 4.7 MMOL/L — SIGNIFICANT CHANGE UP (ref 3.5–5.3)
PROT SERPL-MCNC: 6.3 G/DL — SIGNIFICANT CHANGE UP (ref 6–8.3)
RBC # BLD: 1.97 M/UL — LOW (ref 3.8–5.2)
RBC # FLD: 16.4 % — HIGH (ref 10.3–14.5)
RBC BLD AUTO: SIGNIFICANT CHANGE UP
SODIUM SERPL-SCNC: 136 MMOL/L — SIGNIFICANT CHANGE UP (ref 135–145)
URATE SERPL-MCNC: 4.9 MG/DL — SIGNIFICANT CHANGE UP (ref 2.5–7)
WBC # BLD: 3.11 K/UL — LOW (ref 3.8–10.5)
WBC # FLD AUTO: 3.11 K/UL — LOW (ref 3.8–10.5)

## 2020-05-17 PROCEDURE — 99233 SBSQ HOSP IP/OBS HIGH 50: CPT | Mod: GC

## 2020-05-17 RX ORDER — ZOLPIDEM TARTRATE 10 MG/1
5 TABLET ORAL AT BEDTIME
Refills: 0 | Status: DISCONTINUED | OUTPATIENT
Start: 2020-05-17 | End: 2020-05-18

## 2020-05-17 RX ORDER — OXYCODONE HYDROCHLORIDE 5 MG/1
5 TABLET ORAL EVERY 6 HOURS
Refills: 0 | Status: DISCONTINUED | OUTPATIENT
Start: 2020-05-17 | End: 2020-05-18

## 2020-05-17 RX ADMIN — IMATINIB MESYLATE 400 MILLIGRAM(S): 400 TABLET, FILM COATED ORAL at 17:19

## 2020-05-17 RX ADMIN — URSODIOL 300 MILLIGRAM(S): 250 TABLET, FILM COATED ORAL at 05:07

## 2020-05-17 RX ADMIN — Medication 300 MILLIGRAM(S): at 12:41

## 2020-05-17 RX ADMIN — CEFTRIAXONE 100 MILLIGRAM(S): 500 INJECTION, POWDER, FOR SOLUTION INTRAMUSCULAR; INTRAVENOUS at 22:44

## 2020-05-17 RX ADMIN — HYDROMORPHONE HYDROCHLORIDE 1 MILLIGRAM(S): 2 INJECTION INTRAMUSCULAR; INTRAVENOUS; SUBCUTANEOUS at 17:15

## 2020-05-17 RX ADMIN — LIDOCAINE 1 PATCH: 4 CREAM TOPICAL at 23:03

## 2020-05-17 RX ADMIN — HYDROMORPHONE HYDROCHLORIDE 1 MILLIGRAM(S): 2 INJECTION INTRAMUSCULAR; INTRAVENOUS; SUBCUTANEOUS at 11:10

## 2020-05-17 RX ADMIN — HYDROMORPHONE HYDROCHLORIDE 1 MILLIGRAM(S): 2 INJECTION INTRAMUSCULAR; INTRAVENOUS; SUBCUTANEOUS at 23:00

## 2020-05-17 RX ADMIN — HYDROMORPHONE HYDROCHLORIDE 1 MILLIGRAM(S): 2 INJECTION INTRAMUSCULAR; INTRAVENOUS; SUBCUTANEOUS at 02:01

## 2020-05-17 RX ADMIN — LEVETIRACETAM 500 MILLIGRAM(S): 250 TABLET, FILM COATED ORAL at 05:07

## 2020-05-17 RX ADMIN — SODIUM CHLORIDE 75 MILLILITER(S): 9 INJECTION INTRAMUSCULAR; INTRAVENOUS; SUBCUTANEOUS at 05:11

## 2020-05-17 RX ADMIN — POLYETHYLENE GLYCOL 3350 17 GRAM(S): 17 POWDER, FOR SOLUTION ORAL at 12:41

## 2020-05-17 RX ADMIN — URSODIOL 300 MILLIGRAM(S): 250 TABLET, FILM COATED ORAL at 17:19

## 2020-05-17 RX ADMIN — HYDROMORPHONE HYDROCHLORIDE 1 MILLIGRAM(S): 2 INJECTION INTRAMUSCULAR; INTRAVENOUS; SUBCUTANEOUS at 20:01

## 2020-05-17 RX ADMIN — HYDROMORPHONE HYDROCHLORIDE 1 MILLIGRAM(S): 2 INJECTION INTRAMUSCULAR; INTRAVENOUS; SUBCUTANEOUS at 14:15

## 2020-05-17 RX ADMIN — PANTOPRAZOLE SODIUM 40 MILLIGRAM(S): 20 TABLET, DELAYED RELEASE ORAL at 06:41

## 2020-05-17 RX ADMIN — IMATINIB MESYLATE 400 MILLIGRAM(S): 400 TABLET, FILM COATED ORAL at 05:07

## 2020-05-17 RX ADMIN — HYDROMORPHONE HYDROCHLORIDE 1 MILLIGRAM(S): 2 INJECTION INTRAMUSCULAR; INTRAVENOUS; SUBCUTANEOUS at 08:16

## 2020-05-17 RX ADMIN — HYDROMORPHONE HYDROCHLORIDE 1 MILLIGRAM(S): 2 INJECTION INTRAMUSCULAR; INTRAVENOUS; SUBCUTANEOUS at 05:08

## 2020-05-17 RX ADMIN — SENNA PLUS 2 TABLET(S): 8.6 TABLET ORAL at 21:23

## 2020-05-17 RX ADMIN — LEVETIRACETAM 500 MILLIGRAM(S): 250 TABLET, FILM COATED ORAL at 17:19

## 2020-05-17 NOTE — PROGRESS NOTE ADULT - SUBJECTIVE AND OBJECTIVE BOX
[facility]PC Transplant Team                                                      Critical / Counseling Time Provided: 30 minutes                                                                                                                                                        Chief Complaint:   persistent back pain, relapsed disease     S: Patient seen and examined with [facility]PC Transplant Team:   + back pain   + weakness     S: Patient seen and examined with [facility]PC Transplant Team:   Denies mouth / tongue / throat pain, dyspnea, cough, nausea, vomiting, diarrhea, abdominal pain    O: Vitals:   Vital Signs Last 24 Hrs  T(C): 36.1 (17 May 2020 05:05), Max: 37.9 (16 May 2020 09:35)  T(F): 97 (17 May 2020 05:05), Max: 100.2 (16 May 2020 09:35)  HR: 81 (17 May 2020 05:05) (80 - 86)  BP: 107/65 (17 May 2020 05:05) (106/72 - 121/68)  RR: 20 (17 May 2020 05:05) (18 - 20)  SpO2: 100% (17 May 2020 05:05) (98% - 100%)    Admit weight: 104.3 kg  Daily     Daily     Intake / Output:   05-16 @ 07:01  -  05-17 @ 07:00  --------------------------------------------------------  IN: 3101 mL / OUT: 2500 mL / NET: 601 mL      PE:   General: appears in mild distress   CVS: S1, S2 RRR   Lungs: CTA throughout bilaterally   Abdomen: + BS x 4, soft, NT, ND   Extremities: no edema   Skin: no rash   Neuro: A&O x 3  Pain: chronic low back pain      Labs:        Cultures:   Culture - CSF with Gram Stain . (05.15.20 @ 16:46)    Gram Stain:   polymorphonuclear leukocytes seen  No organisms seen  by cytocentrifuge    Specimen Source: .CSF CSF    Culture - Urine (05.13.20 @ 01:06)    -  Amikacin: S <=16    -  Cefazolin: S <=8 ([email or web address]) For uncomplicated UTI with K. pneumoniae, E. coli, or P. mirablis: DL <=16 is sensitive and DL >=32 is resistant. This also predicts results for oral agents cefaclor, cefdinir, cefpodoxime, cefprozil, cefuroxime axetil, cephalexin and locarbef for uncomplicated UTI. Note that some isolates may be susceptible to these agents while testing resistant to cefazolin.    -  Cefepime: S <=4    -  Cefoxitin: S <=8    -  Ampicillin: S <=8 These ampicillin results predict results for amoxicillin    -  Aztreonam: S <=4    -  Trimethoprim/Sulfamethoxazole: S <=2/38    -  Piperacillin/Tazobactam: S <=16    -  Imipenem: S <=1    -  Tigecycline: S <=2    -  Tobramycin: S <=4    -  Ciprofloxacin: S <=1    -  Gentamicin: S <=4    -  Levofloxacin: S <=2    -  Meropenem: S <=1    -  Nitrofurantoin: S <=32 Should not be used to treat pyelonephritis    -  Ampicillin/Sulbactam: S <=8/4 Enterobacter, Citrobacter, and Serratia may develop resistance during prolonged therapy (3-4 days)    -  Ceftriaxone: S <=1 Enterobacter, Citrobacter, and Serratia may develop resistance during prolonged therapy    Specimen Source: .Urine Clean Catch (Midstream)    Culture Results:   >100,000 CFU/ml Escherichia coli    Organism Identification: Escherichia coli    Organism: Escherichia coli    Method Type: DL      Radiology:     from: MR Thoracic Spine w/wo IV Cont (05.15.20 @ 20:13) >  IMPRESSION: Nonspecific enhancement of the tip of the conus similar to the appearance of the MRI of the lumbar spine 5/12/2020. Differential diagnosis includes infectious, inflammatory and neoplastic processes.      Meds:   Antimicrobials:   cefTRIAXone   IVPB 1000 milliGRAM(s) IV Intermittent every 24 hours      Heme / Onc:   imatinib 400 milliGRAM(s) Oral two times a day  methotrexate PF IntraThecal (eMAR) 12 milliGRAM(s) IntraThecal once      GI:  pantoprazole    Tablet 40 milliGRAM(s) Oral before breakfast  polyethylene glycol 3350 17 Gram(s) Oral daily  senna 2 Tablet(s) Oral at bedtime  ursodiol Capsule 300 milliGRAM(s) Oral every 12 hours      Other medications:   allopurinol 300 milliGRAM(s) Oral daily  levETIRAcetam 500 milliGRAM(s) Oral two times a day  lidocaine   Patch 1 Patch Transdermal every 24 hours  sodium chloride 0.9%. 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    1. Infectious Disease:   cefTRIAXone   IVPB 1000 milliGRAM(s) IV Intermittent every 24 hours    2. GI Prophylaxis:    pantoprazole    Tablet 40 milliGRAM(s) Oral before breakfast    3. Mouthcare ; Skin care     4. Transfuse & replete electrolytes prn     5. IV hydration, daily weights, strict I&O, prn diuresis     6. PO intake as tolerated, nutrition follow up as needed, MVI, folic acid     7. Antiemetics, anti-diarrhea medications:     8. OOB as tolerated, physical therapy consult if needed     9. Monitor coags / fibrinogen 2x week, vitamin K as needed     10. Monitor closely for clinical changes, monitor for fevers     11. Emotional support provided, plan of care discussed and questions addressed     I have written the above note for Dr. Herrera who performed service with me in the room.   Ean Hope PA-C ([phone])    I have seen and examined patient with PA, I agree with above note as scribed. [facility] Transplant Team                                                      Critical / Counseling Time Provided: 30 minutes                                                                                                                                                        Chief Complaint:   persistent back pain, relapsed disease     S: Patient seen and examined with [facility]PC Transplant Team:   + back pain   + weakness     S: Patient seen and examined with [facility]PC Transplant Team:   Denies mouth / tongue / throat pain, dyspnea, cough, nausea, vomiting, diarrhea, abdominal pain    O: Vitals:   Vital Signs Last 24 Hrs  T(C): 36.1 (17 May 2020 05:05), Max: 37.9 (16 May 2020 09:35)  T(F): 97 (17 May 2020 05:05), Max: 100.2 (16 May 2020 09:35)  HR: 81 (17 May 2020 05:05) (80 - 86)  BP: 107/65 (17 May 2020 05:05) (106/72 - 121/68)  RR: 20 (17 May 2020 05:05) (18 - 20)  SpO2: 100% (17 May 2020 05:05) (98% - 100%)    Admit weight: 104.3 kg      Intake / Output:   05-16 @ 07:01  -  05-17 @ 07:00  --------------------------------------------------------  IN: 3101 mL / OUT: 2500 mL / NET: 601 mL      PE:   General: appears in mild distress   CVS: S1, S2 RRR   Lungs: CTA throughout bilaterally   Abdomen: + BS x 4, soft, NT, ND   Extremities: no edema   Skin: no rash   Neuro: A&O x 3  Pain: chronic low back pain      Labs:                          6.1    3.11  )-----------( 8        ( 17 May 2020 11:37 )             19.0     17 May 2020 10:17    136    |  105    |  24     ----------------------------<  137    4.7     |  18     |  0.95     Ca    9.1        17 May 2020 10:17  Phos  3.6       17 May 2020 10:17  Mg     2.1       17 May 2020 10:17    TPro  6.3    /  Alb  3.1    /  TBili  0.3    /  DBili  x      /  AST  31     /  ALT  27     /  AlkPhos  67     17 May 2020 10:17    LIVER FUNCTIONS - ( 17 May 2020 10:17 )  Alb: 3.1 g/dL / Pro: 6.3 g/dL / ALK PHOS: 67 U/L / ALT: 27 U/L / AST: 31 U/L / GGT: x           Cultures:   Culture - CSF with Gram Stain . (05.15.20 @ 16:46)    Gram Stain:   polymorphonuclear leukocytes seen  No organisms seen  by cytocentrifuge    Specimen Source: .CSF CSF    Culture - Urine (05.13.20 @ 01:06)    -  Amikacin: S <=16    -  Cefazolin: S <=8 (MIC_CL_COM_[email or web address]) For uncomplicated UTI with K. pneumoniae, E. coli, or P. mirablis: DL <=16 is sensitive and DL >=32 is resistant. This also predicts results for oral agents cefaclor, cefdinir, cefpodoxime, cefprozil, cefuroxime axetil, cephalexin and locarbef for uncomplicated UTI. Note that some isolates may be susceptible to these agents while testing resistant to cefazolin.    -  Cefepime: S <=4    -  Cefoxitin: S <=8    -  Ampicillin: S <=8 These ampicillin results predict results for amoxicillin    -  Aztreonam: S <=4    -  Trimethoprim/Sulfamethoxazole: S <=2/38    -  Piperacillin/Tazobactam: S <=16    -  Imipenem: S <=1    -  Tigecycline: S <=2    -  Tobramycin: S <=4    -  Ciprofloxacin: S <=1    -  Gentamicin: S <=4    -  Levofloxacin: S <=2    -  Meropenem: S <=1    -  Nitrofurantoin: S <=32 Should not be used to treat pyelonephritis    -  Ampicillin/Sulbactam: S <=8/4 Enterobacter, Citrobacter, and Serratia may develop resistance during prolonged therapy (3-4 days)    -  Ceftriaxone: S <=1 Enterobacter, Citrobacter, and Serratia may develop resistance during prolonged therapy    Specimen Source: .Urine Clean Catch (Midstream)    Culture Results:   >100,000 CFU/ml Escherichia coli    Organism Identification: Escherichia coli    Organism: Escherichia coli    Method Type: DL      Radiology:     from: MR Thoracic Spine w/wo IV Cont (05.15.20 @ 20:13) >  IMPRESSION: Nonspecific enhancement of the tip of the conus similar to the appearance of the MRI of the lumbar spine 5/12/2020. Differential diagnosis includes infectious, inflammatory and neoplastic processes.      Meds:   Antimicrobials:   cefTRIAXone   IVPB 1000 milliGRAM(s) IV Intermittent every 24 hours      Heme / Onc:   imatinib 400 milliGRAM(s) Oral two times a day  methotrexate PF IntraThecal (eMAR) 12 milliGRAM(s) IntraThecal once      GI:  pantoprazole    Tablet 40 milliGRAM(s) Oral before breakfast  polyethylene glycol 3350 17 Gram(s) Oral daily  senna 2 Tablet(s) Oral at bedtime  ursodiol Capsule 300 milliGRAM(s) Oral every 12 hours      Other medications:   allopurinol 300 milliGRAM(s) Oral daily  levETIRAcetam 500 milliGRAM(s) Oral two times a day  lidocaine   Patch 1 Patch Transdermal every 24 hours  sodium chloride 0.9%. 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    1. Infectious Disease:   cefTRIAXone   IVPB 1000 milliGRAM(s) IV Intermittent every 24 hours    2. GI Prophylaxis:    pantoprazole    Tablet 40 milliGRAM(s) Oral before breakfast    3. Mouthcare ; Skin care     4. Transfuse & replete electrolytes prn     5. IV hydration, daily weights, strict I&O, prn diuresis     6. PO intake as tolerated, nutrition follow up as needed, MVI, folic acid     7. Antiemetics, anti-diarrhea medications:     8. OOB as tolerated, physical therapy consult if needed     9. Monitor coags / fibrinogen 2x week, vitamin K as needed     10. Monitor closely for clinical changes, monitor for fevers     11. Emotional support provided, plan of care discussed and questions addressed     I have written the above note for Dr. Herrera who performed service with me in the room.   Ean Hope PA-C ([phone])    I have seen and examined patient with PA, I agree with above note as scribed.

## 2020-05-17 NOTE — PROGRESS NOTE ADULT - ATTENDING COMMENTS
60y F pmhx of Ph+ ALL, hx of SDH, s/p haploSCT 2/13/19, with chronic arthritic back pain p/w acute on chronic low back pain and profound b/l LE weakness, found with leukocytosis and cytopenia concerning for relapse ALL, admitted for further evaluation and management    -d/c'd Hydrea 1gm q12 hr, restarted Gleevec. Cont IV fluids  -monitor for tumor lysis syndrome. On Allopurinol,  -pt has Ommaya s/p IT Mtx given on 5/15 -showed 19% 'other' cells likely due to CSF involvement, f/u flow cytometry. Plan 2x/wk IT chemo, on Tue/Friday's  -BM bx done on 5/15 -f/u results,   -send mutation analysis including T315I and consider Ponatinib  -UTI on admission -started on Ceftriaxone. COVID19 neg on 5/12  -palliative care consult 60y F pmhx of Ph+ ALL, hx of SDH, s/p haploSCT 2/13/19, with chronic arthritic back pain p/w acute on chronic low back pain and profound b/l LE weakness, found with leukocytosis and cytopenia concerning for relapse ALL, admitted for further evaluation and management    -off Hydrea, restarted Gleevec. Cont IV fluids  -monitor for tumor lysis syndrome. On Allopurinol,  -pt has Ommaya s/p IT Mtx given on 5/15 -showed 19% 'other' cells likely due to CSF involvement, f/u flow cytometry. Plan 2x/wk IT chemo, on Tue/Friday's  -BM bx done on 5/15 -f/u results,   -send mutation analysis including T315I and consider Ponatinib  -UTI on admission -started on Ceftriaxone. COVID19 neg on 5/12, 5/16  -palliative care consult

## 2020-05-17 NOTE — PROGRESS NOTE ADULT - PROBLEM SELECTOR PLAN 1
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 Bentley  - F/u FISH for Y, peripheral flow cytometry, and BCR-ABL  - continue IVF  - check CBC with DIFF, CMP, LDH, uric acid daily   - transfuse for hgb<7 and/or platelets <10K  Elitek prn for elevated uric acid  CSF from [facility] 5/15 - appears positive 19% other cells, follow up Flow   Plan for bi-weekly IT chemo via Ommaya reservior (alternate MTX/Delmy-c?)  Bone marrow bx 5/15/20 - results pending

## 2020-05-17 NOTE — PROGRESS NOTE ADULT - PROBLEM SELECTOR PLAN 2
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  Will need to assess cervical and thoracic given new findings of numbness/ tingling with gait instability with MRI - f/u MRI cervical/thoracic spine   pain control with lido patch; tylenol or oxycodone 5mg PO or dilaudid 1 mg IV q6 PRN Patient with acute on chronic back pain, recently with normal MR spine   CT w multilevel facet joint arthropathy and degenerative joint disease in thoracic and lumbar spine   MR lumbar with enhancing T2 prolongation involving the conus ends and enhancement involving the cauda equina as well.  Will need to assess cervical and thoracic given new findings of numbness/ tingling with gait instability with MRI - f/u MRI cervical/thoracic spine   pain control with lido patch; tylenol or oxycodone 5mg PO or dilaudid 1 mg IV q6 PRN  PT consult initiated

## 2020-05-18 LAB
ALBUMIN SERPL ELPH-MCNC: 2.9 G/DL — LOW (ref 3.3–5)
ALP SERPL-CCNC: 58 U/L — SIGNIFICANT CHANGE UP (ref 40–120)
ALT FLD-CCNC: 26 U/L — SIGNIFICANT CHANGE UP (ref 10–45)
ANION GAP SERPL CALC-SCNC: 9 MMOL/L — SIGNIFICANT CHANGE UP (ref 5–17)
APTT BLD: 23.2 SEC — LOW (ref 27.5–36.3)
AST SERPL-CCNC: 33 U/L — SIGNIFICANT CHANGE UP (ref 10–40)
BASOPHILS # BLD AUTO: 0 K/UL — SIGNIFICANT CHANGE UP (ref 0–0.2)
BASOPHILS NFR BLD AUTO: 0 % — SIGNIFICANT CHANGE UP (ref 0–2)
BILIRUB DIRECT SERPL-MCNC: <0.1 MG/DL — SIGNIFICANT CHANGE UP (ref 0–0.2)
BILIRUB INDIRECT FLD-MCNC: >0.2 MG/DL — SIGNIFICANT CHANGE UP (ref 0.2–1)
BILIRUB SERPL-MCNC: 0.3 MG/DL — SIGNIFICANT CHANGE UP (ref 0.2–1.2)
BLASTS # FLD: 4 % — HIGH (ref 0–0)
BLD GP AB SCN SERPL QL: NEGATIVE — SIGNIFICANT CHANGE UP
BUN SERPL-MCNC: 20 MG/DL — SIGNIFICANT CHANGE UP (ref 7–23)
CALCIUM SERPL-MCNC: 9.2 MG/DL — SIGNIFICANT CHANGE UP (ref 8.4–10.5)
CHLORIDE SERPL-SCNC: 105 MMOL/L — SIGNIFICANT CHANGE UP (ref 96–108)
CMV DNA CSF QL NAA+PROBE: SIGNIFICANT CHANGE UP
CMV DNA SPEC NAA+PROBE-LOG#: SIGNIFICANT CHANGE UP LOG10IU/ML
CO2 SERPL-SCNC: 22 MMOL/L — SIGNIFICANT CHANGE UP (ref 22–31)
CREAT SERPL-MCNC: 0.96 MG/DL — SIGNIFICANT CHANGE UP (ref 0.5–1.3)
CSF COMMENTS: SIGNIFICANT CHANGE UP
CULTURE RESULTS: SIGNIFICANT CHANGE UP
EOSINOPHIL # BLD AUTO: 0 K/UL — SIGNIFICANT CHANGE UP (ref 0–0.5)
EOSINOPHIL NFR BLD AUTO: 0 % — SIGNIFICANT CHANGE UP (ref 0–6)
FIBRINOGEN PPP-MCNC: 416 MG/DL — SIGNIFICANT CHANGE UP (ref 350–510)
GLUCOSE SERPL-MCNC: 101 MG/DL — HIGH (ref 70–99)
HCT VFR BLD CALC: 21.4 % — LOW (ref 34.5–45)
HEMATOPATHOLOGY REPORT: SIGNIFICANT CHANGE UP
HGB BLD-MCNC: 7 G/DL — CRITICAL LOW (ref 11.5–15.5)
INR BLD: 0.83 RATIO — LOW (ref 0.88–1.16)
LDH SERPL L TO P-CCNC: 185 U/L — SIGNIFICANT CHANGE UP (ref 50–242)
LYMPHOCYTES # BLD AUTO: 1.4 K/UL — SIGNIFICANT CHANGE UP (ref 1–3.3)
LYMPHOCYTES # BLD AUTO: 96 % — HIGH (ref 13–44)
MAGNESIUM SERPL-MCNC: 2.3 MG/DL — SIGNIFICANT CHANGE UP (ref 1.6–2.6)
MANUAL SMEAR VERIFICATION: SIGNIFICANT CHANGE UP
MCHC RBC-ENTMCNC: 30.7 PG — SIGNIFICANT CHANGE UP (ref 27–34)
MCHC RBC-ENTMCNC: 32.7 GM/DL — SIGNIFICANT CHANGE UP (ref 32–36)
MCV RBC AUTO: 93.9 FL — SIGNIFICANT CHANGE UP (ref 80–100)
MONOCYTES # BLD AUTO: 0 K/UL — SIGNIFICANT CHANGE UP (ref 0–0.9)
MONOCYTES NFR BLD AUTO: 0 % — LOW (ref 2–14)
NEUTROPHILS # BLD AUTO: 0 K/UL — LOW (ref 1.8–7.4)
NEUTROPHILS NFR BLD AUTO: 0 % — LOW (ref 43–77)
NRBC # BLD: 0 /100 — SIGNIFICANT CHANGE UP (ref 0–0)
PHOSPHATE SERPL-MCNC: 3.8 MG/DL — SIGNIFICANT CHANGE UP (ref 2.5–4.5)
PLAT MORPH BLD: NORMAL — SIGNIFICANT CHANGE UP
PLATELET # BLD AUTO: 22 K/UL — LOW (ref 150–400)
POTASSIUM SERPL-MCNC: 5.3 MMOL/L — SIGNIFICANT CHANGE UP (ref 3.5–5.3)
POTASSIUM SERPL-SCNC: 5.3 MMOL/L — SIGNIFICANT CHANGE UP (ref 3.5–5.3)
PROT SERPL-MCNC: 5.8 G/DL — LOW (ref 6–8.3)
PROTHROM AB SERPL-ACNC: 9.5 SEC — LOW (ref 10–12.9)
RBC # BLD: 2.28 M/UL — LOW (ref 3.8–5.2)
RBC # FLD: 16.4 % — HIGH (ref 10.3–14.5)
RBC BLD AUTO: SIGNIFICANT CHANGE UP
RH IG SCN BLD-IMP: POSITIVE — SIGNIFICANT CHANGE UP
SODIUM SERPL-SCNC: 136 MMOL/L — SIGNIFICANT CHANGE UP (ref 135–145)
SPECIMEN SOURCE: SIGNIFICANT CHANGE UP
TM INTERPRETATION: SIGNIFICANT CHANGE UP
TM INTERPRETATION: SIGNIFICANT CHANGE UP
URATE SERPL-MCNC: 4.5 MG/DL — SIGNIFICANT CHANGE UP (ref 2.5–7)
WBC # BLD: 1.46 K/UL — LOW (ref 3.8–10.5)
WBC # FLD AUTO: 1.46 K/UL — LOW (ref 3.8–10.5)

## 2020-05-18 PROCEDURE — 99233 SBSQ HOSP IP/OBS HIGH 50: CPT

## 2020-05-18 PROCEDURE — 99291 CRITICAL CARE FIRST HOUR: CPT

## 2020-05-18 RX ORDER — HYDROMORPHONE HYDROCHLORIDE 2 MG/ML
0.3 INJECTION INTRAMUSCULAR; INTRAVENOUS; SUBCUTANEOUS
Qty: 100 | Refills: 0 | Status: DISCONTINUED | OUTPATIENT
Start: 2020-05-18 | End: 2020-05-20

## 2020-05-18 RX ORDER — NALOXONE HYDROCHLORIDE 4 MG/.1ML
0.1 SPRAY NASAL
Refills: 0 | Status: DISCONTINUED | OUTPATIENT
Start: 2020-05-18 | End: 2020-05-23

## 2020-05-18 RX ORDER — DEXAMETHASONE 0.5 MG/5ML
5 ELIXIR ORAL EVERY 12 HOURS
Refills: 0 | Status: DISCONTINUED | OUTPATIENT
Start: 2020-05-18 | End: 2020-05-18

## 2020-05-18 RX ORDER — HYDROMORPHONE HYDROCHLORIDE 2 MG/ML
0.5 INJECTION INTRAMUSCULAR; INTRAVENOUS; SUBCUTANEOUS
Refills: 0 | Status: DISCONTINUED | OUTPATIENT
Start: 2020-05-18 | End: 2020-05-20

## 2020-05-18 RX ORDER — POLYETHYLENE GLYCOL 3350 17 G/17G
17 POWDER, FOR SOLUTION ORAL EVERY 12 HOURS
Refills: 0 | Status: DISCONTINUED | OUTPATIENT
Start: 2020-05-18 | End: 2020-05-23

## 2020-05-18 RX ORDER — SENNA PLUS 8.6 MG/1
2 TABLET ORAL EVERY 12 HOURS
Refills: 0 | Status: DISCONTINUED | OUTPATIENT
Start: 2020-05-18 | End: 2020-05-23

## 2020-05-18 RX ORDER — DEXAMETHASONE 0.5 MG/5ML
4 ELIXIR ORAL EVERY 6 HOURS
Refills: 0 | Status: DISCONTINUED | OUTPATIENT
Start: 2020-05-18 | End: 2020-05-23

## 2020-05-18 RX ADMIN — URSODIOL 300 MILLIGRAM(S): 250 TABLET, FILM COATED ORAL at 17:42

## 2020-05-18 RX ADMIN — Medication 4 MILLIGRAM(S): at 17:43

## 2020-05-18 RX ADMIN — SENNA PLUS 2 TABLET(S): 8.6 TABLET ORAL at 17:43

## 2020-05-18 RX ADMIN — LIDOCAINE 1 PATCH: 4 CREAM TOPICAL at 21:50

## 2020-05-18 RX ADMIN — LIDOCAINE 1 PATCH: 4 CREAM TOPICAL at 06:42

## 2020-05-18 RX ADMIN — HYDROMORPHONE HYDROCHLORIDE 1 MILLIGRAM(S): 2 INJECTION INTRAMUSCULAR; INTRAVENOUS; SUBCUTANEOUS at 04:50

## 2020-05-18 RX ADMIN — HYDROMORPHONE HYDROCHLORIDE 0.5 MILLIGRAM(S): 2 INJECTION INTRAMUSCULAR; INTRAVENOUS; SUBCUTANEOUS at 13:11

## 2020-05-18 RX ADMIN — HYDROMORPHONE HYDROCHLORIDE 0.3 MG/HR: 2 INJECTION INTRAMUSCULAR; INTRAVENOUS; SUBCUTANEOUS at 13:13

## 2020-05-18 RX ADMIN — URSODIOL 300 MILLIGRAM(S): 250 TABLET, FILM COATED ORAL at 05:16

## 2020-05-18 RX ADMIN — HYDROMORPHONE HYDROCHLORIDE 1 MILLIGRAM(S): 2 INJECTION INTRAMUSCULAR; INTRAVENOUS; SUBCUTANEOUS at 02:00

## 2020-05-18 RX ADMIN — HYDROMORPHONE HYDROCHLORIDE 1 MILLIGRAM(S): 2 INJECTION INTRAMUSCULAR; INTRAVENOUS; SUBCUTANEOUS at 11:00

## 2020-05-18 RX ADMIN — POLYETHYLENE GLYCOL 3350 17 GRAM(S): 17 POWDER, FOR SOLUTION ORAL at 12:37

## 2020-05-18 RX ADMIN — Medication 300 MILLIGRAM(S): at 12:36

## 2020-05-18 RX ADMIN — IMATINIB MESYLATE 400 MILLIGRAM(S): 400 TABLET, FILM COATED ORAL at 17:42

## 2020-05-18 RX ADMIN — ZOLPIDEM TARTRATE 5 MILLIGRAM(S): 10 TABLET ORAL at 00:37

## 2020-05-18 RX ADMIN — PANTOPRAZOLE SODIUM 40 MILLIGRAM(S): 20 TABLET, DELAYED RELEASE ORAL at 05:19

## 2020-05-18 RX ADMIN — LEVETIRACETAM 500 MILLIGRAM(S): 250 TABLET, FILM COATED ORAL at 05:16

## 2020-05-18 RX ADMIN — Medication 4 MILLIGRAM(S): at 12:37

## 2020-05-18 RX ADMIN — LIDOCAINE 1 PATCH: 4 CREAM TOPICAL at 12:32

## 2020-05-18 RX ADMIN — CEFTRIAXONE 100 MILLIGRAM(S): 500 INJECTION, POWDER, FOR SOLUTION INTRAMUSCULAR; INTRAVENOUS at 23:02

## 2020-05-18 RX ADMIN — LEVETIRACETAM 500 MILLIGRAM(S): 250 TABLET, FILM COATED ORAL at 17:42

## 2020-05-18 RX ADMIN — IMATINIB MESYLATE 400 MILLIGRAM(S): 400 TABLET, FILM COATED ORAL at 05:18

## 2020-05-18 NOTE — PROGRESS NOTE ADULT - ATTENDING COMMENTS
In the event of newly developing, evolving, or worsening symptoms, please contact the Palliative Medicine team via pager (if the patient is at [facility] #[phone] or if the patient is at [facility] #[phone]) The Geriatric and Palliative Medicine service has coverage 24 hours a day/ 7 days a week to provide medical recommendations regarding symptom management needs via telephone

## 2020-05-18 NOTE — PROGRESS NOTE ADULT - ATTENDING COMMENTS
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 Cont IV fluids  -monitor for tumor lysis syndrome. On Allopurinol,  -pt has Ommaya s/p IT Mtx given on 5/15 -showed 19% 'other' cells likely due to CSF involvement, f/u flow cytometry. Plan 2x/wk IT chemo, on Tue/Friday's  -BM bx done on 5/15 -f/u results,   -send mutation analysis including T315I and consider Ponatinib  -UTI on admission -started on Ceftriaxone. 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 Cont IV fluids  -monitor for tumor lysis syndrome. On Allopurinol,  -pt has Ommaya s/p IT Mtx given on 5/15 -showed 19% 'other' cells likely due to CSF involvement, f/u flow cytometry. Plan 2x/wk IT chemo, on Tue/Friday's  -BM bx done on 5/15 -f/u results,   -send mutation analysis including T315I and consider Ponatinib  -UTI on admission -started on Ceftriaxone. COVID19 neg on 5/12, 5/16  -palliative care consult

## 2020-05-18 NOTE — PROGRESS NOTE ADULT - PROBLEM SELECTOR PLAN 1
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 Bentley  - F/u FISH for Y, peripheral flow cytometry, and BCR-ABL  - continue IVF  - check CBC with DIFF, CMP, LDH, uric acid daily   - transfuse for hgb<7 and/or platelets <10K  Elitek prn for elevated uric acid  CSF from [facility] 5/15 - appears positive 19% other cells, follow up Flow   Plan for bi-weekly IT chemo via Ommaya reservior (alternate MTX/Delmy-c?)  Bone marrow bx 5/15/20 - results pending  CSF 5/15/20 19 other cells - s/p MTX IT

## 2020-05-18 NOTE — PROGRESS NOTE ADULT - PROBLEM SELECTOR PLAN 2
Predominant symptom: LE/back pain  Likely due to DDx includes spinal involvement of leukemia  Degree of control: suboptimal  Relative to previous day: unchanged  Pain (scale is out of 10) : Max: 10   Min: 7       Personalized pain goal (PPG): 5  Duration of PRN: 2 - 3 hours  Current treatment regimen: IV dilaudid 1mg PRN  Recommendations: take total opioid dose and divide by 24  8mg/24 hr = 0.33/hr  0.3mg IV dilaudid/hr  PRN IV dilaudid 0.5 mg q2H   Risk mitigation/Bowel regimen: avoid BZD/hypnotics/respiratory depressants, if no improvement, may consider morphine rotation should renal function not preclude it. Moreover, if spinal involvement of leukemia, may transition to methadone if escalating doses unhelpful. Bowel regimen changed. See below  Adverse events noted: none a this time  Will incorporate holistic nurse to assist with CAM therapies

## 2020-05-18 NOTE — PROGRESS NOTE ADULT - SUBJECTIVE AND OBJECTIVE BOX
Pain has not evolved and changed, narrative today is more consistent with neuropathic pain and distribution  ? additional imaging planned  Pain has been suboptimally controlled.  She used IV dilaudid 1 mg X 8 in 24 hours  No adverse effects noted by the patient or team  She reports dosing failure at a few hours and that the medication "just reaches" a degree of comfort  Two small stools but her account.  No pain, bloating, n/v              RECENT VITALS/LABS/MEDICATIONS/ASSAYS     20 @ 12:13    T(C): 36.9 (20 @ 05:21), Max: 37 (20 @ 13:45)  HR: 91 (20 @ 05:21) (76 - 94)  BP: 137/79 (20 @ 05:21) (110/80 - 137/79)  RR: 20 (20 @ 05:21) (20 - 20)  SpO2: 100% (20 @ 05:21) (99% - 100%)  Wt(kg): --      17 May 2020 07:  -  18 May 2020 07:00  --------------------------------------------------------  IN:    Oral Fluid: 600 mL    Packed Red Blood Cells: 343 mL    Platelets - Single Donor: 200 mL    sodium chloride 0.9%.: 1755 mL  Total IN: 2898 mL    OUT:    Stool: 300 mL    Voided: 2700 mL  Total OUT: 3000 mL    Total NET: -102 mL           @ 07:01  -   @ 07:00  --------------------------------------------------------  IN: 2898 mL / OUT: 3000 mL / NET: -102 mL      CAPILLARY BLOOD GLUCOSE            allopurinol 300 milliGRAM(s) Oral daily  cefTRIAXone   IVPB 1000 milliGRAM(s) IV Intermittent every 24 hours  dexAMETHasone  Injectable 4 milliGRAM(s) IV Push every 6 hours  HYDROmorphone  Injectable 0.5 milliGRAM(s) IV Push every 2 hours PRN  HYDROmorphone Infusion 0.3 mG/Hr IV Continuous <Continuous>  imatinib 400 milliGRAM(s) Oral two times a day  levETIRAcetam 500 milliGRAM(s) Oral two times a day  lidocaine   Patch 1 Patch Transdermal every 24 hours  methotrexate PF IntraThecal (eMAR) 12 milliGRAM(s) IntraThecal once  pantoprazole    Tablet 40 milliGRAM(s) Oral before breakfast  polyethylene glycol 3350 17 Gram(s) Oral every 12 hours  senna 2 Tablet(s) Oral every 12 hours  sodium chloride 0.9%. 500 milliLiter(s) IV Continuous <Continuous>  ursodiol Capsule 300 milliGRAM(s) Oral every 12 hours          05    136  |  105  |  20  ----------------------------<  101<H>  5.3   |  22  |  0.96    Ca    9.2      18 May 2020 09:07  Phos  3.8       Mg     2.3     18    TPro  5.8<L>  /  Alb  2.9<L>  /  TBili  0.3  /  DBili  <0.1  /  AST  33  /  ALT  26  /  AlkPhos  58  0518      Procalc  BNP  ABG                          7.0    1.46  )-----------( 22       ( 18 May 2020 09:07 )             21.4   PT/INR - ( 18 May 2020 09:07 )   PT: 9.5 sec;   INR: 0.83 ratio         PTT - ( 18 May 2020 09:07 )  PTT:23.2 sec        blood and urine cultures          blood and urine cultures            PERTINENT PM/SXH:   Herpes zoster  Leukemia  Clostridium difficile diarrhea  Intractable migraine with status migrainosus, unspecified migraine type  Sciatica of right side  Chronic gastritis, presence of bleeding unspecified, unspecified gastritis type  Essential hypertension    Lipoma  Elective surgical procedure  History of  delivery    FAMILY HISTORY:  No pertinent family history in first degree relatives    ITEMS NOT CHECKED ARE NOT PRESENT    SOCIAL HISTORY:   Significant other/partner[ ]  Children[x ]  Hoahaoism/Spirituality:  Substance hx:  [ ]   Tobacco hx:  [ ]   Alcohol hx: [ ]   Home Opioid hx:  [ ] I-Stop Reference No:  Living Situation: [ x]Home  [ ]Long term care  [ ]Rehab [ ]Other    ADVANCE DIRECTIVES:    DNR  MOLST  [ ]  Living Will  [ ]   DECISION MAKER(s):  [ ] Health Care Proxy(s)  [x ] Surrogate(s)  [ ] Guardian           Name(s): Phone Number(s):    BASELINE (I)ADL(s) (prior to admission):  [place]: [ ]Total  [ ] Moderate [ ]Dependent    Allergies    No Known Drug Allergies  shellfish (Nausea; Vomiting)    Intolerances    MEDICATIONS  (STANDING):       PRESENT SYMPTOMS: [ ]Unable to obtain due to poor mentation   Source if other than patient:  [ ]Family   [ ]Team     Pain: [x ]yes [ ]no  QOL impact -  See above  Location -                    Aggravating factors -  Quality -  Radiation -  Timing-  Severity (0-10 scale):  Minimal acceptable level (0-10 scale):     PAIN AD Score:     http://geriatrictoolkit.[facility]/cog/painad.pdf (press ctrl +  left click to view)    Dyspnea:                           [ ]Mild [ ]Moderate [ ]Severe  Anxiety:                             [ ]Mild [ ]Moderate [ ]Severe  Fatigue:                             [ ]Mild [ ]Moderate [ ]Severe  Nausea:                             [ ]Mild [ ]Moderate [ ]Severe  Loss of appetite:              [ ]Mild [ ]Moderate [ ]Severe  Constipation:                    [x ]Mild [ ]Moderate [ ]Severe    Other Symptoms:  [x ]All other review of systems negative     Palliative Performance Status Version 2:      40   %    http://npcrc.org/files/news/palliative_performance_scale_ppsv2.pdf  PHYSICAL EXAM:  Vital Signs Last 24 Hrs  T(C): 37.1 (15 May 2020 13:45), Max: 37.1 (15 May 2020 09:45)  T(F): 98.8 (15 May 2020 13:45), Max: 98.8 (15 May 2020 09:45)  HR: 85 (15 May 2020 13:45) (74 - 85)  BP: 130/76 (15 May 2020 13:45) (109/68 - 131/83)  BP(mean): --  RR: 18 (15 May 2020 13:45) (18 - 20)  SpO2: 100% (15 May 2020 13:45) (98% - 100%) I&O's Summary    14 May 2020 07:01  -  15 May 2020 07:00  --------------------------------------------------------  IN: 1264 mL / OUT:  mL / NET: -761 mL    15 May 2020 07:01  -  15 May 2020 16:24  --------------------------------------------------------  IN: 743 mL / OUT: 700 mL / NET: 43 mL      GENERAL:  [x ]Alert  [x ]Oriented x  3  [ ]Lethargic  [ ]Cachexia  [ ]Unarousable  [ ]Verbal  [ ]Non-Verbal  Behavioral:   [ ] Anxiety  [ ] Delirium [ ] Agitation [x ] Other Calm  HEENT:  [ ]Normal   [ x]Dry mouth   [ ]ET Tube/Trach  [ ]Oral lesions  PULMONARY:   [  ]Clear [ ]Tachypnea  [ ]Audible excessive secretions   [ ]Rhonchi        [ ]Right [ ]Left [ ]Bilateral  [ ]Crackles        [ ]Right [ ]Left [ ]Bilateral  [ ]Wheezing     [ ]Right [ ]Left [ ]Bilatera  [x ]Diminished breath sounds [ ]right [ ]left [ ]bilateral  CARDIOVASCULAR:    [ x]Regular [ ]Irregular [ ]Tachy  [ ]Ellis [ ]Murmur [ ]Other  GASTROINTESTINAL:  [ x]Soft  [ x]Distended   [ ]+BS  [x ]Non tender [ ]Tender  [ ]PEG [ ]OGT/ NGT  Last BM:     GENITOURINARY:  [  Normal [ ] Incontinent   [ ]Oliguria/Anuria   [ ]Deng  MUSCULOSKELETAL:     ]Normal   [x ]Weakness  [ ]Bed/Wheelchair bound [ ]Edema back pain  NEUROLOGIC:   [ No focal deficits  [ ]Cognitive impairment  [ ]Dysphagia [ ]Dysarthria [ ]Paresis [ sx]Other paresthesias   SKIN:   [x ]Normal    [ ]Rash  [ ]Pressure ulcer(s)       Present on admission [ ]y [ ]n    CRITICAL CARE:  [ ] Shock Present  [ ]Septic [ ]Cardiogenic [ ]Neurologic [ ]Hypovolemic  [ ]  Vasopressors [ ]  Inotropes   [ ]Respiratory failure present [ ]Mechanical ventilation [ ]Non-invasive ventilatory support [ ]High flow  [ ]Acute  [ ]Chronic [ ]Hypoxic  [ ]Hypercarbic [ ]Other  [ ]Other organ failure     LABS:                        7.9    11.02 )-----------( 17       ( 15 May 2020 09:47 )             24.3   05-15    134<L>  |  101  |  19  ----------------------------<  112<H>  5.1   |  24  |  0.97    Ca    9.2      15 May 2020 09:47  Phos  4.0     05-15  Mg     2.1     05-15    TPro  6.3  /  Alb  3.1<L>  /  TBili  0.2  /  DBili  <0.1  /  AST  21  /  ALT  23  /  AlkPhos  65  -15  PT/INR - ( 14 May 2020 10:29 )   PT: 10.4 sec;   INR: 0.91 ratio         PTT - ( 14 May 2020 10:29 )  PTT:25.4 sec      RADIOLOGY & ADDITIONAL STUDIES:    PROTEIN CALORIE MALNUTRITION PRESENT: [ ]mild [ ]moderate [ ]severe [ ]underweight [ ]morbid obesity  https://www.andeal.org/vault/2440/web/files/ONC/Table_Clinical%20Characteristics%20to%20Document%20Malnutrition-White%20JV%20et%20al%957457.pdf    Height (cm): 160.02 (20 @ 15:31)  Weight (kg): 104.3 (20 @ 15:31)  BMI (kg/m2): 40.7 (20 @ 15:31)    [ ]PPSV2 < or = to 30% [ ]significant weight loss  [ ]poor nutritional intake  [ ]anasarca     Albumin, Serum: 3.1 g/dL (05-15-20 @ 09:47)   [ ]Artificial Nutrition      REFERRALS:   [ ]Chaplaincy  [ ]Hospice  [ ]Child Life  [ ]Social Work  [ ]Case management [ ]Holistic Therapy     Goals of Care Document:

## 2020-05-18 NOTE — PROGRESS NOTE ADULT - PROBLEM SELECTOR PLAN 2
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  Will need to assess cervical and thoracic given new findings of numbness/ tingling with gait instability with MRI - f/u MRI cervical/thoracic spine   pain control with lido patch; tylenol or oxycodone 5mg PO or dilaudid 1 mg IV q6 PRN  PT consult initiated

## 2020-05-18 NOTE — PROGRESS NOTE ADULT - PROBLEM SELECTOR PLAN 6
Actions:  [x] Rapport building     [x] Symptom assessment    [] Eliciting preferences of goals   [] Prognostic understanding    [] Emotional Support  [] Coping skill development  []  Other  Interdisciplinary Referrals: Holistic nurse  Communication: d/w SW/NP  Documentation Review: [x] Primary Team [] Consultants [] Interdisciplinary team  Content: ? further imaging

## 2020-05-19 LAB
ALBUMIN SERPL ELPH-MCNC: 3.4 G/DL — SIGNIFICANT CHANGE UP (ref 3.3–5)
ALP SERPL-CCNC: 68 U/L — SIGNIFICANT CHANGE UP (ref 40–120)
ALT FLD-CCNC: 38 U/L — SIGNIFICANT CHANGE UP (ref 10–45)
ANION GAP SERPL CALC-SCNC: 14 MMOL/L — SIGNIFICANT CHANGE UP (ref 5–17)
ANISOCYTOSIS BLD QL: SLIGHT — SIGNIFICANT CHANGE UP
APPEARANCE CSF: CLEAR — SIGNIFICANT CHANGE UP
APPEARANCE SPUN FLD: COLORLESS — SIGNIFICANT CHANGE UP
AST SERPL-CCNC: 34 U/L — SIGNIFICANT CHANGE UP (ref 10–40)
BASOPHILS # BLD AUTO: 0 K/UL — SIGNIFICANT CHANGE UP (ref 0–0.2)
BASOPHILS NFR BLD AUTO: 0 % — SIGNIFICANT CHANGE UP (ref 0–2)
BCR/ABL BY RT - PCR QUANTITATIVE: SIGNIFICANT CHANGE UP
BILIRUB SERPL-MCNC: 0.2 MG/DL — SIGNIFICANT CHANGE UP (ref 0.2–1.2)
BUN SERPL-MCNC: 24 MG/DL — HIGH (ref 7–23)
CALCIUM SERPL-MCNC: 10 MG/DL — SIGNIFICANT CHANGE UP (ref 8.4–10.5)
CHLORIDE SERPL-SCNC: 104 MMOL/L — SIGNIFICANT CHANGE UP (ref 96–108)
CHROM ANALY INTERPHASE BLD FISH-IMP: SIGNIFICANT CHANGE UP
CO2 SERPL-SCNC: 18 MMOL/L — LOW (ref 22–31)
COLOR CSF: SIGNIFICANT CHANGE UP
CREAT SERPL-MCNC: 0.8 MG/DL — SIGNIFICANT CHANGE UP (ref 0.5–1.3)
DACRYOCYTES BLD QL SMEAR: SLIGHT — SIGNIFICANT CHANGE UP
ELLIPTOCYTES BLD QL SMEAR: SLIGHT — SIGNIFICANT CHANGE UP
EOSINOPHIL # BLD AUTO: 0 K/UL — SIGNIFICANT CHANGE UP (ref 0–0.5)
EOSINOPHIL NFR BLD AUTO: 0 % — SIGNIFICANT CHANGE UP (ref 0–6)
GLUCOSE CSF-MCNC: 85 MG/DL — HIGH (ref 40–70)
GLUCOSE SERPL-MCNC: 193 MG/DL — HIGH (ref 70–99)
HCT VFR BLD CALC: 23.3 % — LOW (ref 34.5–45)
HGB BLD-MCNC: 7.9 G/DL — LOW (ref 11.5–15.5)
LDH CSF L TO P-CCNC: 86 U/L — SIGNIFICANT CHANGE UP
LDH FLD-CCNC: 86 U/L — SIGNIFICANT CHANGE UP
LDH SERPL L TO P-CCNC: 158 U/L — SIGNIFICANT CHANGE UP (ref 50–242)
LYMPHOCYTES # BLD AUTO: 1.23 K/UL — SIGNIFICANT CHANGE UP (ref 1–3.3)
LYMPHOCYTES # BLD AUTO: 96.9 % — HIGH (ref 13–44)
LYMPHOCYTES # CSF: 44 % — SIGNIFICANT CHANGE UP (ref 40–80)
MAGNESIUM SERPL-MCNC: 2.2 MG/DL — SIGNIFICANT CHANGE UP (ref 1.6–2.6)
MANUAL SMEAR VERIFICATION: SIGNIFICANT CHANGE UP
MCHC RBC-ENTMCNC: 31.6 PG — SIGNIFICANT CHANGE UP (ref 27–34)
MCHC RBC-ENTMCNC: 33.9 GM/DL — SIGNIFICANT CHANGE UP (ref 32–36)
MCV RBC AUTO: 93.2 FL — SIGNIFICANT CHANGE UP (ref 80–100)
MICROCYTES BLD QL: SLIGHT — SIGNIFICANT CHANGE UP
MONOCYTES # BLD AUTO: 0.02 K/UL — SIGNIFICANT CHANGE UP (ref 0–0.9)
MONOCYTES NFR BLD AUTO: 1.6 % — LOW (ref 2–14)
NEUTROPHILS # BLD AUTO: 0.02 K/UL — LOW (ref 1.8–7.4)
NEUTROPHILS # CSF: 0 % — SIGNIFICANT CHANGE UP (ref 0–6)
NEUTROPHILS NFR BLD AUTO: 1.5 % — LOW (ref 43–77)
NRBC # BLD: 0 /100 WBCS — SIGNIFICANT CHANGE UP (ref 0–0)
NRBC NFR CSF: 78 /UL — HIGH (ref 0–5)
OTHER CELLS CSF MANUAL: 56 % — HIGH (ref 0–0)
PHOSPHATE SERPL-MCNC: 2.9 MG/DL — SIGNIFICANT CHANGE UP (ref 2.5–4.5)
PLAT MORPH BLD: NORMAL — SIGNIFICANT CHANGE UP
PLATELET # BLD AUTO: 21 K/UL — LOW (ref 150–400)
POIKILOCYTOSIS BLD QL AUTO: SLIGHT — SIGNIFICANT CHANGE UP
POTASSIUM SERPL-MCNC: 4.3 MMOL/L — SIGNIFICANT CHANGE UP (ref 3.5–5.3)
POTASSIUM SERPL-SCNC: 4.3 MMOL/L — SIGNIFICANT CHANGE UP (ref 3.5–5.3)
PROT CSF-MCNC: 38 MG/DL — SIGNIFICANT CHANGE UP (ref 15–45)
PROT SERPL-MCNC: 6.6 G/DL — SIGNIFICANT CHANGE UP (ref 6–8.3)
RBC # BLD: 2.5 M/UL — LOW (ref 3.8–5.2)
RBC # CSF: 10 /UL — HIGH (ref 0–0)
RBC # FLD: 15.9 % — HIGH (ref 10.3–14.5)
RBC BLD AUTO: ABNORMAL
SARS-COV-2 RNA SPEC QL NAA+PROBE: SIGNIFICANT CHANGE UP
SODIUM SERPL-SCNC: 136 MMOL/L — SIGNIFICANT CHANGE UP (ref 135–145)
TUBE TYPE: SIGNIFICANT CHANGE UP
URATE SERPL-MCNC: 4.6 MG/DL — SIGNIFICANT CHANGE UP (ref 2.5–7)
WBC # BLD: 1.27 K/UL — LOW (ref 3.8–10.5)
WBC # FLD AUTO: 1.27 K/UL — LOW (ref 3.8–10.5)

## 2020-05-19 PROCEDURE — 70552 MRI BRAIN STEM W/DYE: CPT | Mod: 26

## 2020-05-19 PROCEDURE — 99497 ADVNCD CARE PLAN 30 MIN: CPT

## 2020-05-19 PROCEDURE — 99233 SBSQ HOSP IP/OBS HIGH 50: CPT

## 2020-05-19 PROCEDURE — 99291 CRITICAL CARE FIRST HOUR: CPT

## 2020-05-19 PROCEDURE — 88189 FLOWCYTOMETRY/READ 16 & >: CPT

## 2020-05-19 PROCEDURE — 72142 MRI NECK SPINE W/DYE: CPT | Mod: 26

## 2020-05-19 PROCEDURE — 88108 CYTOPATH CONCENTRATE TECH: CPT | Mod: 26,59

## 2020-05-19 RX ORDER — CYTARABINE 50 MG/5ML
50 INJECTION, LIPID COMPLEX INTRATHECAL ONCE
Refills: 0 | Status: DISCONTINUED | OUTPATIENT
Start: 2020-05-19 | End: 2020-05-23

## 2020-05-19 RX ADMIN — POLYETHYLENE GLYCOL 3350 17 GRAM(S): 17 POWDER, FOR SOLUTION ORAL at 17:15

## 2020-05-19 RX ADMIN — LIDOCAINE 1 PATCH: 4 CREAM TOPICAL at 06:53

## 2020-05-19 RX ADMIN — URSODIOL 300 MILLIGRAM(S): 250 TABLET, FILM COATED ORAL at 06:52

## 2020-05-19 RX ADMIN — SENNA PLUS 2 TABLET(S): 8.6 TABLET ORAL at 17:15

## 2020-05-19 RX ADMIN — LEVETIRACETAM 500 MILLIGRAM(S): 250 TABLET, FILM COATED ORAL at 06:52

## 2020-05-19 RX ADMIN — PANTOPRAZOLE SODIUM 40 MILLIGRAM(S): 20 TABLET, DELAYED RELEASE ORAL at 06:52

## 2020-05-19 RX ADMIN — POLYETHYLENE GLYCOL 3350 17 GRAM(S): 17 POWDER, FOR SOLUTION ORAL at 06:52

## 2020-05-19 RX ADMIN — LIDOCAINE 1 PATCH: 4 CREAM TOPICAL at 09:30

## 2020-05-19 RX ADMIN — LIDOCAINE 1 PATCH: 4 CREAM TOPICAL at 21:28

## 2020-05-19 RX ADMIN — Medication 1 MILLIGRAM(S): at 11:04

## 2020-05-19 RX ADMIN — Medication 300 MILLIGRAM(S): at 12:45

## 2020-05-19 RX ADMIN — Medication 4 MILLIGRAM(S): at 12:45

## 2020-05-19 RX ADMIN — SENNA PLUS 2 TABLET(S): 8.6 TABLET ORAL at 06:52

## 2020-05-19 RX ADMIN — IMATINIB MESYLATE 400 MILLIGRAM(S): 400 TABLET, FILM COATED ORAL at 17:15

## 2020-05-19 RX ADMIN — URSODIOL 300 MILLIGRAM(S): 250 TABLET, FILM COATED ORAL at 17:15

## 2020-05-19 RX ADMIN — LEVETIRACETAM 500 MILLIGRAM(S): 250 TABLET, FILM COATED ORAL at 17:15

## 2020-05-19 RX ADMIN — HYDROMORPHONE HYDROCHLORIDE 0.5 MILLIGRAM(S): 2 INJECTION INTRAMUSCULAR; INTRAVENOUS; SUBCUTANEOUS at 10:54

## 2020-05-19 RX ADMIN — Medication 4 MILLIGRAM(S): at 06:52

## 2020-05-19 RX ADMIN — Medication 4 MILLIGRAM(S): at 17:15

## 2020-05-19 RX ADMIN — IMATINIB MESYLATE 400 MILLIGRAM(S): 400 TABLET, FILM COATED ORAL at 06:53

## 2020-05-19 RX ADMIN — Medication 4 MILLIGRAM(S): at 00:44

## 2020-05-19 NOTE — PROGRESS NOTE ADULT - PROBLEM SELECTOR PLAN 2
Predominant symptom: LE/back pain   DDx includes spinal involvement of leukemia/OA/Chronic  Degree of control: Well controlled  Relative to previous day: Improved  Pain (scale is out of 10) : Max: 10      Current treatment regimen: dilaudid 0.3 gtt  Recommendations: maintain 0.3mg IV dilaudid/hr  PRN IV dilaudid 0.5 mg q2H   Risk mitigation/Bowel regimen: avoid BZD/hypnotics/respiratory depressants, if no improvement, may consider morphine rotation should renal function not preclude it. Moreover, if spinal involvement of leukemia, may transition to methadone if escalating doses unhelpful. Bowel regimen changed. See below  Adverse events noted: none a this time  Maintain holistic nurse to assist with CAM therapies

## 2020-05-19 NOTE — GOALS OF CARE CONVERSATION - ADVANCED CARE PLANNING - CONVERSATION DETAILS
I spoke with the patient about HCP assignment.  I inquired if they had filled out a health care proxy form in the past  Next, [facility] explained [facility] decision maker selection  I highlighted the impact of not assigning a HCP  She would like her daughter in law Germaine to be her HCP  Questions were answered to their satisfaction  d/w SW to complete HCP form    Time spent >16 min

## 2020-05-19 NOTE — PROGRESS NOTE ADULT - PROBLEM SELECTOR PLAN 6
Actions:  [x] Rapport building     [x] Symptom assessment    [] Eliciting preferences of goals   [] Prognostic understanding    [] Emotional Support  [] Coping skill development  []  Other  Interdisciplinary Referrals: Holistic nurse  Communication: d/w SW/NP  Documentation Review: [x] Primary Team [] Consultants [] Interdisciplinary team

## 2020-05-19 NOTE — PROGRESS NOTE ADULT - PROBLEM SELECTOR PLAN 1
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 Bentley  - F/u FISH for Y, peripheral flow cytometry, and BCR-ABL  - continue IVF  - check CBC with DIFF, CMP, LDH, uric acid daily   - transfuse for hgb<7 and/or platelets <10K  Elitek prn for elevated uric acid  CSF from [facility] 5/15 - appears positive 19% other cells, follow up Flow   Plan for bi-weekly IT chemo via Ommaya reservior (alternate MTX/Delmy-c?)  Bone marrow bx 5/15/20 - results pending  CSF 5/15/20 19 other cells - s/p MTX IT 5/15/20  IT DELMY-C 5/19/20

## 2020-05-19 NOTE — PROGRESS NOTE ADULT - SUBJECTIVE AND OBJECTIVE BOX
Overnight events: No major events  Staff reports: improvement in analgesia  Patient: She is very thankful for improvement in symptoms, namely pain and insomnia. No acute adverse effects  PRN use: PRN dilaudid X 1          RECENT VITALS/LABS/MEDICATIONS/ASSAYS     20 @ 12:24    T(C): 36.4 (20 @ 09:24), Max: 37.1 (20 @ 14:00)  HR: 79 (20 @ 09:24) (63 - 84)  BP: 115/77 (20 @ 09:24) (112/68 - 129/85)  RR: 20 (20 @ 09:24) (19 - 20)  SpO2: 100% (20 @ 09:24) (100% - 100%)  Wt(kg): --      18 May 2020 07:01  -  19 May 2020 07:00  --------------------------------------------------------  IN:    HYDROmorphone  Infusion: 2.8 mL    Oral Fluid: 750 mL    sodium chloride 0.9%.: 1389 mL  Total IN: 2141.8 mL    OUT:    Voided: 2550 mL  Total OUT: 2550 mL    Total NET: -408.2 mL           @ 07:01  -   @ 07:00  --------------------------------------------------------  IN: 2141.8 mL / OUT: 2550 mL / NET: -408.2 mL      CAPILLARY BLOOD GLUCOSE            allopurinol 300 milliGRAM(s) Oral daily  cytarabine PF IntraThecal (eMAR) 50 milliGRAM(s) IntraThecal once  dexAMETHasone  Injectable 4 milliGRAM(s) IV Push every 6 hours  HYDROmorphone  Injectable 0.5 milliGRAM(s) IV Push every 2 hours PRN  HYDROmorphone Infusion 0.3 mG/Hr IV Continuous <Continuous>  imatinib 400 milliGRAM(s) Oral two times a day  levETIRAcetam 500 milliGRAM(s) Oral two times a day  lidocaine   Patch 1 Patch Transdermal every 24 hours  methotrexate PF IntraThecal (eMAR) 12 milliGRAM(s) IntraThecal once  naloxone Injectable 0.1 milliGRAM(s) IV Push every 3 minutes PRN  pantoprazole    Tablet 40 milliGRAM(s) Oral before breakfast  polyethylene glycol 3350 17 Gram(s) Oral every 12 hours  senna 2 Tablet(s) Oral every 12 hours  sodium chloride 0.9%. 500 milliLiter(s) IV Continuous <Continuous>  ursodiol Capsule 300 milliGRAM(s) Oral every 12 hours              136  |  104  |  24<H>  ----------------------------<  193<H>  4.3   |  18<L>  |  0.80    Ca    10.0      19 May 2020 09:33  Phos  2.9       Mg     2.2         TPro  6.6  /  Alb  3.4  /  TBili  0.2  /  DBili  x   /  AST  34  /  ALT  38  /  AlkPhos  68        Procalc  BNP  ABG                          7.9    1.27  )-----------( 21       ( 19 May 2020 09:33 )             23.3   PT/INR - ( 18 May 2020 09:07 )   PT: 9.5 sec;   INR: 0.83 ratio         PTT - ( 18 May 2020 09:07 )  PTT:23.2 sec        blood and urine cultures                PERTINENT PM/SXH:   Herpes zoster  Leukemia  Clostridium difficile diarrhea  Intractable migraine with status migrainosus, unspecified migraine type  Sciatica of right side  Chronic gastritis, presence of bleeding unspecified, unspecified gastritis type  Essential hypertension    Lipoma  Elective surgical procedure  History of  delivery    FAMILY HISTORY:  No pertinent family history in first degree relatives    ITEMS NOT CHECKED ARE NOT PRESENT    SOCIAL HISTORY:   Significant other/partner[ ]  Children[x ]  Hoahaoism/Spirituality:  Substance hx:  [ ]   Tobacco hx:  [ ]   Alcohol hx: [ ]   Home Opioid hx:  [ ] I-Stop Reference No:  Living Situation: [ x]Home  [ ]Long term care  [ ]Rehab [ ]Other    ADVANCE DIRECTIVES:    DNR  MOLST  [ ]  Living Will  [ ]   DECISION MAKER(s):  [ ] Health Care Proxy(s)  [x ] Surrogate(s)  [ ] Guardian           Name(s): Phone Number(s):    BASELINE (I)ADL(s) (prior to admission):  [place]: [ ]Total  [ ] Moderate [ ]Dependent    Allergies    No Known Drug Allergies  shellfish (Nausea; Vomiting)    Intolerances    MEDICATIONS  (STANDING):       PRESENT SYMPTOMS: [ ]Unable to obtain due to poor mentation   Source if other than patient:  [ ]Family   [ ]Team     Pain: [x ]yes [ ]no  QOL impact -  See above  Location -                    Aggravating factors -  Quality -  Radiation -  Timing-  Severity (0-10 scale):  Minimal acceptable level (0-10 scale):     PAIN AD Score:     http://geriatrictoolkit.[facility]/cog/painad.pdf (press ctrl +  left click to view)    Dyspnea:                           [ ]Mild [ ]Moderate [ ]Severe  Anxiety:                             [ ]Mild [ ]Moderate [ ]Severe  Fatigue:                             [ ]Mild [ ]Moderate [ ]Severe  Nausea:                             [ ]Mild [ ]Moderate [ ]Severe  Loss of appetite:              [ ]Mild [ ]Moderate [ ]Severe  Constipation:                    [x ]Mild [ ]Moderate [ ]Severe    Other Symptoms:  [x ]All other review of systems negative     Palliative Performance Status Version 2:      40   %    http://npcrc.org/files/news/palliative_performance_scale_ppsv2.pdf       GENERAL:  [x ]Alert  [x ]Oriented x  3  [ ]Lethargic  [ ]Cachexia  [ ]Unarousable  [ ]Verbal  [ ]Non-Verbal  Behavioral:   [ ] Anxiety  [ ] Delirium [ ] Agitation [x ] Other Calm  HEENT:  [ ]Normal   [ x]Dry mouth   [ ]ET Tube/Trach  [ ]Oral lesions  PULMONARY:   [  ]Clear [ ]Tachypnea  [ ]Audible excessive secretions   [ ]Rhonchi        [ ]Right [ ]Left [ ]Bilateral  [ ]Crackles        [ ]Right [ ]Left [ ]Bilateral  [ ]Wheezing     [ ]Right [ ]Left [ ]Bilatera  [x ]Diminished breath sounds [ ]right [ ]left [ ]bilateral  CARDIOVASCULAR:    [ x]Regular [ ]Irregular [ ]Tachy  [ ]Ellis [ ]Murmur [ ]Other  GASTROINTESTINAL:  [ x]Soft  [ x]Distended   [ ]+BS  [x ]Non tender [ ]Tender  [ ]PEG [ ]OGT/ NGT  Last BM:     GENITOURINARY:  [  Normal [ ] Incontinent   [ ]Oliguria/Anuria   [ ]Deng  MUSCULOSKELETAL:     ]Normal   [x ]Weakness  [ ]Bed/Wheelchair bound [ ]Edema back pain  NEUROLOGIC:   [ No focal deficits  [ ]Cognitive impairment  [ ]Dysphagia [ ]Dysarthria [ ]Paresis [ sx]Other paresthesias   SKIN:   [x ]Normal    [ ]Rash  [ ]Pressure ulcer(s)       Present on admission [ ]y [ ]n    CRITICAL CARE:  [ ] Shock Present  [ ]Septic [ ]Cardiogenic [ ]Neurologic [ ]Hypovolemic  [ ]  Vasopressors [ ]  Inotropes   [ ]Respiratory failure present [ ]Mechanical ventilation [ ]Non-invasive ventilatory support [ ]High flow  [ ]Acute  [ ]Chronic [ ]Hypoxic  [ ]Hypercarbic [ ]Other  [ ]Other organ failure     LABS:                        7.9    11.02 )-----------( 17       ( 15 May 2020 09:47 )             24.3   05-15    134<L>  |  101  |  19  ----------------------------<  112<H>  5.1   |  24  |  0.97    Ca    9.2      15 May 2020 09:47  Phos  4.0     05-15  Mg     2.1     05-15    TPro  6.3  /  Alb  3.1<L>  /  TBili  0.2  /  DBili  <0.1  /  AST  21  /  ALT  23  /  AlkPhos  65  05-15  PT/INR - ( 14 May 2020 10:29 )   PT: 10.4 sec;   INR: 0.91 ratio         PTT - ( 14 May 2020 10:29 )  PTT:25.4 sec      RADIOLOGY & ADDITIONAL STUDIES:    PROTEIN CALORIE MALNUTRITION PRESENT: [ ]mild [ ]moderate [ ]severe [ ]underweight [ ]morbid obesity  https://www.andeal.org/vault/2440/web/files/ONC/Table_Clinical%20Characteristics%20to%20Document%20Malnutrition-White%20JV%20et%20al%296393.pdf    Height (cm): 160.02 (20 @ 15:31)  Weight (kg): 104.3 (20 @ 15:31)  BMI (kg/m2): 40.7 (20 @ 15:31)    [ ]PPSV2 < or = to 30% [ ]significant weight loss  [ ]poor nutritional intake  [ ]anasarca     Albumin, Serum: 3.1 g/dL (05-15-20 @ 09:47)   [ ]Artificial Nutrition      REFERRALS:   [ ]Chaplaincy  [ ]Hospice  [ ]Child Life  [ ]Social Work  [ ]Case management [ ]Holistic Therapy     Goals of Care Document:

## 2020-05-19 NOTE — PROGRESS NOTE ADULT - SUBJECTIVE AND OBJECTIVE BOX
[facility] Transplant Team                                                      Critical / Counseling Time Provided: 30 minutes                                                                                                                                                        Chief Complaint: persistent back pain, relapsed disease     S: Patient seen and examined with [facility] Transplant Team:   + back pain   + weakness     O: Vitals:   Vital Signs Last 24 Hrs  T(C): 36.4 (19 May 2020 06:27), Max: 37.1 (18 May 2020 14:00)  T(F): 97.5 (19 May 2020 06:27), Max: 98.8 (18 May 2020 14:00)  HR: 63 (19 May 2020 06:27) (63 - 84)  BP: 121/77 (19 May 2020 06:27) (112/68 - 138/84)  BP(mean): --  RR: 20 (19 May 2020 06:27) (19 - 20)  SpO2: 100% (19 May 2020 06:27) (100% - 100%)    Admit weight: 104.3kg   Today's weight: 104.3kg 5/12/20     Intake / Output:   05-18 @ 07:01  -  05-19 @ 07:00  --------------------------------------------------------  IN: 2141.8 mL / OUT: 2550 mL / NET: -408.2 mL      PE:   General: appears in mild distress   CVS: S1, S2 RRR   Lungs: CTA throughout bilaterally   Abdomen: + BS x 4, soft, NT, ND   Extremities: no edema   Skin: no rash   Neuro: A&O x 3  Pain: chronic low back pain      Labs:       Cultures:   Culture - CSF with Gram Stain . (05.15.20 @ 16:46)    Gram Stain:   polymorphonuclear leukocytes seen  No organisms seen  by cytocentrifuge    Specimen Source: .CSF CSF    Culture - Urine (05.13.20 @ 01:06)    -  Amikacin: S <=16    -  Cefazolin: S <=8 (MIC_CL_[id number]) For uncomplicated UTI with K. pneumoniae, E. coli, or P. mirablis: DL <=16 is sensitive and DL >=32 is resistant. This also predicts results for oral agents cefaclor, cefdinir, cefpodoxime, cefprozil, cefuroxime axetil, cephalexin and locarbef for uncomplicated UTI. Note that some isolates may be susceptible to these agents while testing resistant to cefazolin.    -  Cefepime: S <=4    -  Cefoxitin: S <=8    -  Ampicillin: S <=8 These ampicillin results predict results for amoxicillin    -  Aztreonam: S <=4    -  Trimethoprim/Sulfamethoxazole: S <=2/38    -  Piperacillin/Tazobactam: S <=16    -  Imipenem: S <=1    -  Tigecycline: S <=2    -  Tobramycin: S <=4    -  Ciprofloxacin: S <=1    -  Gentamicin: S <=4    -  Levofloxacin: S <=2    -  Meropenem: S <=1    -  Nitrofurantoin: S <=32 Should not be used to treat pyelonephritis    -  Ampicillin/Sulbactam: S <=8/4 Enterobacter, Citrobacter, and Serratia may develop resistance during prolonged therapy (3-4 days)    -  Ceftriaxone: S <=1 Enterobacter, Citrobacter, and Serratia may develop resistance during prolonged therapy    Specimen Source: .Urine Clean Catch (Midstream)    Culture Results:   >100,000 CFU/ml Escherichia coli    Organism Identification: Escherichia coli    Organism: Escherichia coli    Method Type: [facility]      Radiology:   EXAM:  MR BRAIN                        PROCEDURE DATE:  05/14/2020    IMPRESSION:  New mild to moderate acute hydrocephalus present with associated transependymal flow of cerebral spinal fluid.  Abnormal signal involves the subarachnoid space and layering fluid is noted within the ventricular system. Differential considerations include meningitis or carcinomatosis, less likely subarachnoid hemorrhage.   Thickening ofthe pituitary stalk, lower hypothalamus, and optic chiasm is present suspicious for an underlying tumor, infection, or inflammatory process.  Abnormal signal involves the brainstem and portions of the cerebellum as described. Some considerations include a rhombencephalitis, chemotherapy/medication effect, PML, demyelination, versus underlying infectious, neoplastic, or inflammatory lesion.  Edema and swelling is present in the right frontal lobe surrounding the Ommaya reservoir catheter. Thiscould reflect leaking of CSF around the catheter from the ventricular system given the new hydrocephalus, versus underlying infection or mass lesion.  A follow-up brain MRI with contrast is recommended for further workup.    from: MR Thoracic Spine w/wo IV Cont (05.15.20 @ 20:13) >  IMPRESSION: Nonspecific enhancement of the tip of the conus similar to the appearance of the MRI of the lumbar spine 5/12/2020. Differential diagnosis includes infectious, inflammatory and neoplastic processes.    Meds:   Antimicrobials:       Heme / Onc:   cytarabine PF IntraThecal (eMAR) 50 milliGRAM(s) IntraThecal once  imatinib 400 milliGRAM(s) Oral two times a day  methotrexate PF IntraThecal (eMAR) 12 milliGRAM(s) IntraThecal once      GI:  pantoprazole    Tablet 40 milliGRAM(s) Oral before breakfast  polyethylene glycol 3350 17 Gram(s) Oral every 12 hours  senna 2 Tablet(s) Oral every 12 hours  ursodiol Capsule 300 milliGRAM(s) Oral every 12 hours      Cardiovascular:       Immunologic:       Other medications:   allopurinol 300 milliGRAM(s) Oral daily  dexAMETHasone  Injectable 4 milliGRAM(s) IV Push every 6 hours  HYDROmorphone Infusion 0.3 mG/Hr IV Continuous <Continuous>  levETIRAcetam 500 milliGRAM(s) Oral two times a day  lidocaine   Patch 1 Patch Transdermal every 24 hours  sodium chloride 0.9%. 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    1. Infectious Disease:   Received course of ceftriaxone for UTI    2. GI Prophylaxis:   pantoprazole    Tablet 40 milliGRAM(s) Oral before breakfast    3. Mouthcare; Skin care     4. Transfuse & replete electrolytes prn     5. IV hydration, daily weights, strict I&O, prn diuresis     6. PO intake as tolerated, nutrition follow up as needed, MVI, folic acid     7. Antiemetics, anti-diarrhea medications:      8. OOB as tolerated, physical therapy consult if needed     9. Monitor coags / fibrinogen 2x week, vitamin K as needed     10. Monitor closely for clinical changes, monitor for fevers     11. Emotional support provided, plan of care discussed and questions addressed     I have written the above note for Dr. Herrera who performed service with me in the room.   Janett Barrow NP-C ([phone])    I have seen and examined patient with NP, I agree with above note as scribed.

## 2020-05-19 NOTE — PROGRESS NOTE ADULT - PROBLEM SELECTOR PLAN 2
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  Will need to assess cervical and thoracic given new findings of numbness/ tingling with gait instability with MRI - f/u MRI cervical/thoracic spine   PT following   Refusing contrast MRI of the head / c-spine   Palliative care following for pain management

## 2020-05-19 NOTE — PROGRESS NOTE ADULT - PROBLEM SELECTOR PLAN 5
U/A positive, bacteria in urine, + LE   + frequency, no hesitancy or dysuria   Urine cx 100k GNR, f/u sensitivities   received ceftriaxone 1 gram q24 hours (5/13 - 5/18)

## 2020-05-19 NOTE — PROGRESS NOTE ADULT - ATTENDING COMMENTS
In the event of newly developing, evolving, or worsening symptoms, please contact the Palliative Medicine team via pager (if the patient is at [facility] #[phone] or if the patient is at [facility] #[phone]) The Geriatric and Palliative Medicine service has coverage 24 hours a day/ 7 days a week to provide medical recommendations regarding symptom management needs via telephone

## 2020-05-19 NOTE — PROGRESS NOTE ADULT - ATTENDING COMMENTS
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 Cont IV fluids  -monitor for tumor lysis syndrome. On Allopurinol,  -pt has Ommaya s/p IT Mtx given on 5/15 -showed 19% 'other' cells likely due to CSF involvement, f/u flow cytometry. Plan 2x/wk IT chemo, on Tue/Friday's  -BM bx done on 5/15 -f/u results,   -send mutation analysis including T315I and consider Ponatinib  -UTI on admission -started on Ceftriaxone. 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 Cont IV fluids  -monitor for tumor lysis syndrome. On Allopurinol,  -pt has Ommaya s/p IT Mtx given on 5/15 -showed 19% 'other' cells likely due to CSF involvement, flow cytometry showed 84% blasts. Plan 2x/wk IT chemo, on Tue/Friday's. Will give Cytarabine 50mg IT today, f/u flow from it  -BM bx done on 5/15 -showed 54% blasts  -send mutation analysis including T315I and consider Ponatinib  -UTI on admission -started on Ceftriaxone. COVID19 neg on 5/12, 5/16  -palliative care consult

## 2020-05-20 LAB
ALBUMIN SERPL ELPH-MCNC: 3.2 G/DL — LOW (ref 3.3–5)
ALP SERPL-CCNC: 67 U/L — SIGNIFICANT CHANGE UP (ref 40–120)
ALT FLD-CCNC: 49 U/L — HIGH (ref 10–45)
ANION GAP SERPL CALC-SCNC: 10 MMOL/L — SIGNIFICANT CHANGE UP (ref 5–17)
ANISOCYTOSIS BLD QL: SLIGHT — SIGNIFICANT CHANGE UP
AST SERPL-CCNC: 34 U/L — SIGNIFICANT CHANGE UP (ref 10–40)
BASOPHILS # BLD AUTO: 0 K/UL — SIGNIFICANT CHANGE UP (ref 0–0.2)
BASOPHILS NFR BLD AUTO: 0 % — SIGNIFICANT CHANGE UP (ref 0–2)
BILIRUB DIRECT SERPL-MCNC: <0.1 MG/DL — SIGNIFICANT CHANGE UP (ref 0–0.2)
BILIRUB INDIRECT FLD-MCNC: >0.2 MG/DL — SIGNIFICANT CHANGE UP (ref 0.2–1)
BILIRUB SERPL-MCNC: 0.3 MG/DL — SIGNIFICANT CHANGE UP (ref 0.2–1.2)
BLASTS # FLD: 8 % — HIGH (ref 0–0)
BLD GP AB SCN SERPL QL: NEGATIVE — SIGNIFICANT CHANGE UP
BUN SERPL-MCNC: 31 MG/DL — HIGH (ref 7–23)
CALCIUM SERPL-MCNC: 9.5 MG/DL — SIGNIFICANT CHANGE UP (ref 8.4–10.5)
CHLORIDE SERPL-SCNC: 110 MMOL/L — HIGH (ref 96–108)
CO2 SERPL-SCNC: 19 MMOL/L — LOW (ref 22–31)
CREAT SERPL-MCNC: 0.84 MG/DL — SIGNIFICANT CHANGE UP (ref 0.5–1.3)
CSF COMMENTS: SIGNIFICANT CHANGE UP
DACRYOCYTES BLD QL SMEAR: SLIGHT — SIGNIFICANT CHANGE UP
ELLIPTOCYTES BLD QL SMEAR: SLIGHT — SIGNIFICANT CHANGE UP
EOSINOPHIL # BLD AUTO: 0 K/UL — SIGNIFICANT CHANGE UP (ref 0–0.5)
EOSINOPHIL NFR BLD AUTO: 0 % — SIGNIFICANT CHANGE UP (ref 0–6)
GLUCOSE SERPL-MCNC: 159 MG/DL — HIGH (ref 70–99)
HCT VFR BLD CALC: 21 % — CRITICAL LOW (ref 34.5–45)
HGB BLD-MCNC: 6.8 G/DL — CRITICAL LOW (ref 11.5–15.5)
LDH SERPL L TO P-CCNC: 200 U/L — SIGNIFICANT CHANGE UP (ref 50–242)
LYMPHOCYTES # BLD AUTO: 0.86 K/UL — LOW (ref 1–3.3)
LYMPHOCYTES # BLD AUTO: 90 % — HIGH (ref 13–44)
MAGNESIUM SERPL-MCNC: 2.4 MG/DL — SIGNIFICANT CHANGE UP (ref 1.6–2.6)
MANUAL SMEAR VERIFICATION: SIGNIFICANT CHANGE UP
MCHC RBC-ENTMCNC: 30.6 PG — SIGNIFICANT CHANGE UP (ref 27–34)
MCHC RBC-ENTMCNC: 32.4 GM/DL — SIGNIFICANT CHANGE UP (ref 32–36)
MCV RBC AUTO: 94.6 FL — SIGNIFICANT CHANGE UP (ref 80–100)
MICROCYTES BLD QL: SLIGHT — SIGNIFICANT CHANGE UP
MONOCYTES # BLD AUTO: 0 K/UL — SIGNIFICANT CHANGE UP (ref 0–0.9)
MONOCYTES NFR BLD AUTO: 0 % — LOW (ref 2–14)
NEUTROPHILS # BLD AUTO: 0.02 K/UL — LOW (ref 1.8–7.4)
NEUTROPHILS NFR BLD AUTO: 2 % — LOW (ref 43–77)
NRBC # BLD: 0 /100 — SIGNIFICANT CHANGE UP (ref 0–0)
PHOSPHATE SERPL-MCNC: 4.4 MG/DL — SIGNIFICANT CHANGE UP (ref 2.5–4.5)
PLAT MORPH BLD: NORMAL — SIGNIFICANT CHANGE UP
PLATELET # BLD AUTO: 11 K/UL — CRITICAL LOW (ref 150–400)
POIKILOCYTOSIS BLD QL AUTO: SLIGHT — SIGNIFICANT CHANGE UP
POTASSIUM SERPL-MCNC: 5.2 MMOL/L — SIGNIFICANT CHANGE UP (ref 3.5–5.3)
POTASSIUM SERPL-SCNC: 5.2 MMOL/L — SIGNIFICANT CHANGE UP (ref 3.5–5.3)
PROT SERPL-MCNC: 6.4 G/DL — SIGNIFICANT CHANGE UP (ref 6–8.3)
RBC # BLD: 2.22 M/UL — LOW (ref 3.8–5.2)
RBC # FLD: 15.6 % — HIGH (ref 10.3–14.5)
RBC BLD AUTO: ABNORMAL
RH IG SCN BLD-IMP: POSITIVE — SIGNIFICANT CHANGE UP
SODIUM SERPL-SCNC: 139 MMOL/L — SIGNIFICANT CHANGE UP (ref 135–145)
TM INTERPRETATION: SIGNIFICANT CHANGE UP
URATE SERPL-MCNC: 5.1 MG/DL — SIGNIFICANT CHANGE UP (ref 2.5–7)
WBC # BLD: 0.95 K/UL — CRITICAL LOW (ref 3.8–10.5)
WBC # FLD AUTO: 0.95 K/UL — CRITICAL LOW (ref 3.8–10.5)

## 2020-05-20 PROCEDURE — 99233 SBSQ HOSP IP/OBS HIGH 50: CPT

## 2020-05-20 PROCEDURE — 99291 CRITICAL CARE FIRST HOUR: CPT

## 2020-05-20 RX ORDER — ONDANSETRON 8 MG/1
8 TABLET, FILM COATED ORAL ONCE
Refills: 0 | Status: COMPLETED | OUTPATIENT
Start: 2020-05-20 | End: 2020-05-20

## 2020-05-20 RX ORDER — HYDROMORPHONE HYDROCHLORIDE 2 MG/ML
0.3 INJECTION INTRAMUSCULAR; INTRAVENOUS; SUBCUTANEOUS
Refills: 0 | Status: DISCONTINUED | OUTPATIENT
Start: 2020-05-20 | End: 2020-05-22

## 2020-05-20 RX ORDER — ONDANSETRON 8 MG/1
8 TABLET, FILM COATED ORAL EVERY 8 HOURS
Refills: 0 | Status: DISCONTINUED | OUTPATIENT
Start: 2020-05-20 | End: 2020-05-23

## 2020-05-20 RX ORDER — HYDROMORPHONE HYDROCHLORIDE 2 MG/ML
0.2 INJECTION INTRAMUSCULAR; INTRAVENOUS; SUBCUTANEOUS
Qty: 100 | Refills: 0 | Status: DISCONTINUED | OUTPATIENT
Start: 2020-05-20 | End: 2020-05-22

## 2020-05-20 RX ORDER — LACTULOSE 10 G/15ML
10 SOLUTION ORAL EVERY 8 HOURS
Refills: 0 | Status: DISCONTINUED | OUTPATIENT
Start: 2020-05-20 | End: 2020-05-23

## 2020-05-20 RX ADMIN — POLYETHYLENE GLYCOL 3350 17 GRAM(S): 17 POWDER, FOR SOLUTION ORAL at 18:16

## 2020-05-20 RX ADMIN — IMATINIB MESYLATE 400 MILLIGRAM(S): 400 TABLET, FILM COATED ORAL at 06:53

## 2020-05-20 RX ADMIN — LEVETIRACETAM 500 MILLIGRAM(S): 250 TABLET, FILM COATED ORAL at 06:53

## 2020-05-20 RX ADMIN — LIDOCAINE 1 PATCH: 4 CREAM TOPICAL at 10:58

## 2020-05-20 RX ADMIN — Medication 4 MILLIGRAM(S): at 11:18

## 2020-05-20 RX ADMIN — SENNA PLUS 2 TABLET(S): 8.6 TABLET ORAL at 07:31

## 2020-05-20 RX ADMIN — Medication 300 MILLIGRAM(S): at 12:33

## 2020-05-20 RX ADMIN — LEVETIRACETAM 500 MILLIGRAM(S): 250 TABLET, FILM COATED ORAL at 18:16

## 2020-05-20 RX ADMIN — SENNA PLUS 2 TABLET(S): 8.6 TABLET ORAL at 18:17

## 2020-05-20 RX ADMIN — ONDANSETRON 8 MILLIGRAM(S): 8 TABLET, FILM COATED ORAL at 07:31

## 2020-05-20 RX ADMIN — PANTOPRAZOLE SODIUM 40 MILLIGRAM(S): 20 TABLET, DELAYED RELEASE ORAL at 06:53

## 2020-05-20 RX ADMIN — POLYETHYLENE GLYCOL 3350 17 GRAM(S): 17 POWDER, FOR SOLUTION ORAL at 06:55

## 2020-05-20 RX ADMIN — LACTULOSE 10 GRAM(S): 10 SOLUTION ORAL at 16:06

## 2020-05-20 RX ADMIN — LIDOCAINE 1 PATCH: 4 CREAM TOPICAL at 21:50

## 2020-05-20 RX ADMIN — Medication 4 MILLIGRAM(S): at 06:53

## 2020-05-20 RX ADMIN — LACTULOSE 10 GRAM(S): 10 SOLUTION ORAL at 21:51

## 2020-05-20 RX ADMIN — Medication 4 MILLIGRAM(S): at 23:26

## 2020-05-20 RX ADMIN — LIDOCAINE 1 PATCH: 4 CREAM TOPICAL at 08:34

## 2020-05-20 RX ADMIN — URSODIOL 300 MILLIGRAM(S): 250 TABLET, FILM COATED ORAL at 18:16

## 2020-05-20 RX ADMIN — Medication 4 MILLIGRAM(S): at 00:43

## 2020-05-20 RX ADMIN — URSODIOL 300 MILLIGRAM(S): 250 TABLET, FILM COATED ORAL at 06:53

## 2020-05-20 RX ADMIN — Medication 4 MILLIGRAM(S): at 18:16

## 2020-05-20 RX ADMIN — IMATINIB MESYLATE 400 MILLIGRAM(S): 400 TABLET, FILM COATED ORAL at 18:16

## 2020-05-20 NOTE — PROGRESS NOTE ADULT - SUBJECTIVE AND OBJECTIVE BOX
Overnight events: no major events. MRI completed  Staff reports: pain is well controlled  Patient: She reports improvement in analagesia  PRN use: 2 prn used in 24 hours        RECENT VITALS/LABS/MEDICATIONS/ASSAYS     20 @ 14:58    T(C): 36.1 (20 @ 09:27), Max: 37.1 (20 @ 21:45)  HR: 84 (20 @ 12:00) (72 - 92)  BP: 118/62 (20 @ 12:00) (118/62 - 150/81)  RR: 19 (20 @ 12:00) (18 - 20)  SpO2: 100% (20 @ 12:00) (96% - 100%)  Wt(kg): --      19 May 2020 07:  -  20 May 2020 07:00  --------------------------------------------------------  IN:    HYDROmorphone  Infusion: 1.2 mL    Oral Fluid: 450 mL    sodium chloride 0.9%.: 1315 mL  Total IN: 1766.2 mL    OUT:    Stool: 300 mL    Voided: 2450 mL  Total OUT: 2750 mL    Total NET: -983.8 mL      20 May 2020 07:  -  20 May 2020 14:58  --------------------------------------------------------  IN:    HYDROmorphone  Infusion: 2 mL    Oral Fluid: 400 mL    sodium chloride 0.9%.: 150 mL  Total IN: 552 mL    OUT:    Voided: 600 mL  Total OUT: 600 mL    Total NET: -48 mL           @ 07:01  -   @ 07:00  --------------------------------------------------------  IN: 1766.2 mL / OUT: 2750 mL / NET: -983.8 mL     @ 07:01  -   @ 14:58  --------------------------------------------------------  IN: 552 mL / OUT: 600 mL / NET: -48 mL      CAPILLARY BLOOD GLUCOSE            allopurinol 300 milliGRAM(s) Oral daily  cytarabine PF IntraThecal (eMAR) 50 milliGRAM(s) IntraThecal once  dexAMETHasone  Injectable 4 milliGRAM(s) IV Push every 6 hours  HYDROmorphone  Injectable 0.3 milliGRAM(s) IV Push every 2 hours PRN  HYDROmorphone Infusion 0.2 mG/Hr IV Continuous <Continuous>  imatinib 400 milliGRAM(s) Oral two times a day  lactulose Syrup 10 Gram(s) Oral every 8 hours  levETIRAcetam 500 milliGRAM(s) Oral two times a day  lidocaine   Patch 1 Patch Transdermal every 24 hours  LORazepam   Injectable 1 milliGRAM(s) IV Push once  methotrexate PF IntraThecal (eMAR) 12 milliGRAM(s) IntraThecal once  naloxone Injectable 0.1 milliGRAM(s) IV Push every 3 minutes PRN  ondansetron Injectable 8 milliGRAM(s) IV Push every 8 hours PRN  pantoprazole    Tablet 40 milliGRAM(s) Oral before breakfast  polyethylene glycol 3350 17 Gram(s) Oral every 12 hours  senna 2 Tablet(s) Oral every 12 hours  sodium chloride 0.9%. 500 milliLiter(s) IV Continuous <Continuous>  ursodiol Capsule 300 milliGRAM(s) Oral every 12 hours              139  |  110<H>  |  31<H>  ----------------------------<  159<H>  5.2   |  19<L>  |  0.84    Ca    9.5      20 May 2020 09:16  Phos  4.4     05-20  Mg     2.4         TPro  6.4  /  Alb  3.2<L>  /  TBili  0.3  /  DBili  <0.1  /  AST  34  /  ALT  49<H>  /  AlkPhos  67  05-20      Procalc  BNP  ABG                          6.8    0.95  )-----------( 11       ( 20 May 2020 09:16 )             21.0           blood and urine cultures                      PERTINENT PM/SXH:   Herpes zoster  Leukemia  Clostridium difficile diarrhea  Intractable migraine with status migrainosus, unspecified migraine type  Sciatica of right side  Chronic gastritis, presence of bleeding unspecified, unspecified gastritis type  Essential hypertension    Lipoma  Elective surgical procedure  History of  delivery    FAMILY HISTORY:  No pertinent family history in first degree relatives    ITEMS NOT CHECKED ARE NOT PRESENT    SOCIAL HISTORY:   Significant other/partner[ ]  Children[x ]  Christian/Spirituality:  Substance hx:  [ ]   Tobacco hx:  [ ]   Alcohol hx: [ ]   Home Opioid hx:  [ ] I-Stop Reference No:  Living Situation: [ x]Home  [ ]Long term care  [ ]Rehab [ ]Other    ADVANCE DIRECTIVES:    DNR  MOLST  [ ]  Living Will  [ ]   DECISION MAKER(s):  [ ] Health Care Proxy(s)  [x ] Surrogate(s)  [ ] Guardian           Name(s): Phone Number(s):    BASELINE (I)ADL(s) (prior to admission):  [place]: [ ]Total  [ ] Moderate [ ]Dependent    Allergies    No Known Drug Allergies  shellfish (Nausea; Vomiting)    Intolerances    MEDICATIONS  (STANDING):       PRESENT SYMPTOMS: [ ]Unable to obtain due to poor mentation   Source if other than patient:  [ ]Family   [ ]Team     Pain: [x ]yes [ ]no  QOL impact -  See above  Location -                    Aggravating factors -  Quality -  Radiation -  Timing-  Severity (0-10 scale):  Minimal acceptable level (0-10 scale):     PAIN AD Score:     http://geriatrictoolkit.[place].[facility]/cog/painad.pdf (press ctrl +  left click to view)    Dyspnea:                           [ ]Mild [ ]Moderate [ ]Severe  Anxiety:                             [ ]Mild [ ]Moderate [ ]Severe  Fatigue:                             [ ]Mild [ ]Moderate [ ]Severe  Nausea:                             [ ]Mild [ ]Moderate [ ]Severe  Loss of appetite:              [ ]Mild [ ]Moderate [ ]Severe  Constipation:                    [x ]Mild [ ]Moderate [ ]Severe    Other Symptoms:  [x ]All other review of systems negative     Palliative Performance Status Version 2:      40   %    http://npcrc.org/files/news/palliative_performance_scale_ppsv2.pdf       GENERAL:  [x ]Alert  [x ]Oriented x  3  [ ]Lethargic  [ ]Cachexia  [ ]Unarousable  [ ]Verbal  [ ]Non-Verbal  Behavioral:   [ ] Anxiety  [ ] Delirium [ ] Agitation [x ] Other Calm  HEENT:  [ ]Normal   [ x]Dry mouth   [ ]ET Tube/Trach  [ ]Oral lesions  PULMONARY:   [  ]Clear [ ]Tachypnea  [ ]Audible excessive secretions   [ ]Rhonchi        [ ]Right [ ]Left [ ]Bilateral  [ ]Crackles        [ ]Right [ ]Left [ ]Bilateral  [ ]Wheezing     [ ]Right [ ]Left [ ]Bilatera  [x ]Diminished breath sounds [ ]right [ ]left [ ]bilateral  CARDIOVASCULAR:    [ x]Regular [ ]Irregular [ ]Tachy  [ ]Ellis [ ]Murmur [ ]Other  GASTROINTESTINAL:  [ x]Soft  [ x]Distended   [ ]+BS  [x ]Non tender [ ]Tender  [ ]PEG [ ]OGT/ NGT  Last BM:     GENITOURINARY:  [  Normal [ ] Incontinent   [ ]Oliguria/Anuria   [ ]Deng  MUSCULOSKELETAL:     ]Normal   [x ]Weakness  [ ]Bed/Wheelchair bound [ ]Edema back pain  NEUROLOGIC:   [ No focal deficits  [ ]Cognitive impairment  [ ]Dysphagia [ ]Dysarthria [ ]Paresis [ sx]Other paresthesias   SKIN:   [x ]Normal    [ ]Rash  [ ]Pressure ulcer(s)       Present on admission [ ]y [ ]n    CRITICAL CARE:  [ ] Shock Present  [ ]Septic [ ]Cardiogenic [ ]Neurologic [ ]Hypovolemic  [ ]  Vasopressors [ ]  Inotropes   [ ]Respiratory failure present [ ]Mechanical ventilation [ ]Non-invasive ventilatory support [ ]High flow  [ ]Acute  [ ]Chronic [ ]Hypoxic  [ ]Hypercarbic [ ]Other  [ ]Other organ failure     LABS:                        7.9    11.02 )-----------( 17       ( 15 May 2020 09:47 )             24.3   05-15    134<L>  |  101  |  19  ----------------------------<  112<H>  5.1   |  24  |  0.97    Ca    9.2      15 May 2020 09:47  Phos  4.0     05-15  Mg     2.1     05-15    TPro  6.3  /  Alb  3.1<L>  /  TBili  0.2  /  DBili  <0.1  /  AST  21  /  ALT  23  /  AlkPhos  65  05-15  PT/INR - ( 14 May 2020 10:29 )   PT: 10.4 sec;   INR: 0.91 ratio         PTT - ( 14 May 2020 10:29 )  PTT:25.4 sec      RADIOLOGY & ADDITIONAL STUDIES:    PROTEIN CALORIE MALNUTRITION PRESENT: [ ]mild [ ]moderate [ ]severe [ ]underweight [ ]morbid obesity  https://www.andeal.org/vault/2440/web/files/ONC/Table_Clinical%20Characteristics%20to%20Document%20Malnutrition-White%20JV%20et%20al%884525.pdf    Height (cm): 160.02 (20 @ 15:31)  Weight (kg): 104.3 (20 @ 15:31)  BMI (kg/m2): 40.7 (20 @ 15:31)    [ ]PPSV2 < or = to 30% [ ]significant weight loss  [ ]poor nutritional intake  [ ]anasarca     Albumin, Serum: 3.1 g/dL (05-15-20 @ 09:47)   [ ]Artificial Nutrition      REFERRALS:   [ ]Chaplaincy  [ ]Hospice  [ ]Child Life  [ ]Social Work  [ ]Case management [ ]Holistic Therapy     Goals of Care Document:

## 2020-05-20 NOTE — PROGRESS NOTE ADULT - PROBLEM SELECTOR PLAN 1
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

## 2020-05-20 NOTE — PROGRESS NOTE ADULT - ATTENDING COMMENTS
Given imaging, this may be a chronic issue. Will evaluate CIPN potential    In the event of newly developing, evolving, or worsening symptoms, please contact the Palliative Medicine team via pager (if the patient is at [facility] #[phone] or if the patient is at [facility] #[phone]) The Geriatric and Palliative Medicine service has coverage 24 hours a day/ 7 days a week to provide medical recommendations regarding symptom management needs via telephone

## 2020-05-20 NOTE — PROGRESS NOTE ADULT - ATTENDING COMMENTS
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 Cont IV fluids  -monitor for tumor lysis syndrome. On Allopurinol,  -pt has Ommaya s/p IT Mtx given on 5/15 -showed 19% 'other' cells likely due to CSF involvement, flow cytometry showed 84% blasts. Plan 2x/wk IT chemo, on Tue/Friday's. Will give Cytarabine 50mg IT today, f/u flow from it  -BM bx done on 5/15 -showed 54% blasts  -send mutation analysis including T315I and consider Ponatinib  -UTI on admission -started on Ceftriaxone. 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 Cont IV fluids  -monitor for tumor lysis syndrome. On Allopurinol,  -pt has Ommaya s/p IT Mtx given on 5/15 -showed 19% 'other' cells likely due to CSF involvement, flow cytometry showed 84% blasts. Plan 2x/wk IT chemo, on Tue/Friday's. Given Cytarabine 50mg IT on 5/19, f/u flow cytometry  -BM bx done on 5/15 -showed 54% blasts  -send mutation analysis including T315I and consider Ponatinib  -UTI on admission -started on Ceftriaxone. COVID19 neg on 5/12, 5/16  -palliative care consult

## 2020-05-20 NOTE — PROGRESS NOTE ADULT - PROBLEM SELECTOR PLAN 1
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 Bentley  - F/u FISH for Y, peripheral flow cytometry, and BCR-ABL  - continue IVF  - check CBC with DIFF, CMP, LDH, uric acid daily   - transfuse for hgb<7 and/or platelets <10K  [place] prn for elevated uric acid  CSF from [facility] 5/15 - appears positive 19% other cells, follow up Flow   Plan for bi-weekly IT chemo via Ommaya reservior (alternate MTX/Delmy-c?)  Bone marrow bx 5/15/20 - results pending  CSF 5/15/20 19 other cells - s/p MTX IT 5/15/20  IT DELMY-C 5/19/20

## 2020-05-20 NOTE — PROGRESS NOTE ADULT - SUBJECTIVE AND OBJECTIVE BOX
[facility]PC Transplant Team                                                      Critical / Counseling Time Provided: 30 minutes                                                                                                                                                        Chief Complaint: persistent back pain, relapsed disease     S: Patient seen and examined with HPC Transplant Team:   + back pain   + weakness     O: Vitals:   Vital Signs Last 24 Hrs  T(C): 36.1 (20 May 2020 09:27), Max: 37.1 (19 May 2020 21:45)  T(F): 96.9 (20 May 2020 09:27), Max: 98.7 (19 May 2020 21:45)  HR: 84 (20 May 2020 09:27) (72 - 95)  BP: 123/80 (20 May 2020 09:27) (118/79 - 150/81)  BP(mean): --  RR: 20 (20 May 2020 09:27) (18 - 20)  SpO2: 100% (20 May 2020 09:27) (96% - 100%)    Admit weight: 104.3kg   Today's weight:  104.3kg 5/12/20     Intake / Output:   05-19 @ 07:01  -  05-20 @ 07:00  --------------------------------------------------------  IN: 1766.2 mL / OUT: 2750 mL / NET: -983.8 mL        PE:   General: appears in mild distress   CVS: S1, S2 RRR   Lungs: CTA throughout bilaterally   Abdomen: + BS x 4, soft, NT, ND   Extremities: no edema   Skin: no rash   Neuro: A&O x 3  Pain: chronic low back pain      Labs:   CBC Full  -  ( 19 May 2020 09:33 )  WBC Count : 1.27 K/uL  Hemoglobin : 7.9 g/dL  Hematocrit : 23.3 %  Platelet Count - Automated : 21 K/uL  Mean Cell Volume : 93.2 fl  Mean Cell Hemoglobin : 31.6 pg  Mean Cell Hemoglobin Concentration : 33.9 gm/dL  Auto Neutrophil # : 0.02 K/uL  Auto Lymphocyte # : 1.23 K/uL  Auto Monocyte # : 0.02 K/uL  Auto Eosinophil # : 0.00 K/uL  Auto Basophil # : 0.00 K/uL  Auto Neutrophil % : 1.5 %  Auto Lymphocyte % : 96.9 %  Auto Monocyte % : 1.6 %  Auto Eosinophil % : 0.0 %  Auto Basophil % : 0.0 %                          7.9    1.27  )-----------( 21       ( 19 May 2020 09:33 )             23.3     05-19    136  |  104  |  24<H>  ----------------------------<  193<H>  4.3   |  18<L>  |  0.80    Ca    10.0      19 May 2020 09:33  Phos  2.9     05-19  Mg     2.2     05-19    TPro  6.6  /  Alb  3.4  /  TBili  0.2  /  DBili  x   /  AST  34  /  ALT  38  /  AlkPhos  68  05-19      LIVER FUNCTIONS - ( 19 May 2020 09:33 )  Alb: 3.4 g/dL / Pro: 6.6 g/dL / ALK PHOS: 68 U/L / ALT: 38 U/L / AST: 34 U/L / GGT: x           Cultures:   Culture - CSF with Gram Stain . (05.15.20 @ 16:46)    Gram Stain:   polymorphonuclear leukocytes seen  No organisms seen  by cytocentrifuge    Specimen Source: .CSF CSF    Culture - Urine (05.13.20 @ 01:06)    -  Amikacin: S <=16    -  Cefazolin: S <=8 (MIC_CL_COM_ENTERIC_CEFAZU) For uncomplicated UTI with K. pneumoniae, E. coli, or P. mirablis: DL <=16 is sensitive and DL >=32 is resistant. This also predicts results for oral agents cefaclor, cefdinir, cefpodoxime, cefprozil, cefuroxime axetil, cephalexin and locarbef for uncomplicated UTI. Note that some isolates may be susceptible to these agents while testing resistant to cefazolin.    -  Cefepime: S <=4    -  Cefoxitin: S <=8    -  Ampicillin: S <=8 These ampicillin results predict results for amoxicillin    -  Aztreonam: S <=4    -  Trimethoprim/Sulfamethoxazole: S <=2/38    -  Piperacillin/Tazobactam: S <=16    -  Imipenem: S <=1    -  Tigecycline: S <=2    -  Tobramycin: S <=4    -  Ciprofloxacin: S <=1    -  Gentamicin: S <=4    -  Levofloxacin: S <=2    -  Meropenem: S <=1    -  Nitrofurantoin: S <=32 Should not be used to treat pyelonephritis    -  Ampicillin/Sulbactam: S <=8/4 Enterobacter, Citrobacter, and Serratia may develop resistance during prolonged therapy (3-4 days)    -  Ceftriaxone: S <=1 Enterobacter, Citrobacter, and Serratia may develop resistance during prolonged therapy    Specimen Source: .Urine Clean Catch (Midstream)    Culture Results:   >100,000 CFU/ml Escherichia coli    Organism Identification: Escherichia coli    Organism: Escherichia coli    Method Type: DL    Radiology:   EXAM:  MR BRAIN                        PROCEDURE DATE:  05/14/2020    IMPRESSION:  New mild to moderate acute hydrocephalus present with associated transependymal flow of cerebral spinal fluid.  Abnormal signal involves the subarachnoid space and layering fluid is noted within the ventricular system. Differential considerations include meningitis or carcinomatosis, less likely subarachnoid hemorrhage.   Thickening ofthe pituitary stalk, lower hypothalamus, and optic chiasm is present suspicious for an underlying tumor, infection, or inflammatory process.  Abnormal signal involves the brainstem and portions of the cerebellum as described. Some considerations include a rhombencephalitis, chemotherapy/medication effect, PML, demyelination, versus underlying infectious, neoplastic, or inflammatory lesion.  Edema and swelling is present in the right frontal lobe surrounding the Ommaya reservoir catheter. Thiscould reflect leaking of CSF around the catheter from the ventricular system given the new hydrocephalus, versus underlying infection or mass lesion.  A follow-up brain MRI with contrast is recommended for further workup.    from: MR Thoracic Spine w/wo IV Cont (05.15.20 @ 20:13) >  IMPRESSION: Nonspecific enhancement of the tip of the conus similar to the appearance of the MRI of the lumbar spine 5/12/2020. Differential diagnosis includes infectious, inflammatory and neoplastic processes.    Meds:   Antimicrobials:       Heme / Onc:   cytarabine PF IntraThecal (eMAR) 50 milliGRAM(s) IntraThecal once  imatinib 400 milliGRAM(s) Oral two times a day  methotrexate PF IntraThecal (eMAR) 12 milliGRAM(s) IntraThecal once      GI:  pantoprazole    Tablet 40 milliGRAM(s) Oral before breakfast  polyethylene glycol 3350 17 Gram(s) Oral every 12 hours  senna 2 Tablet(s) Oral every 12 hours  ursodiol Capsule 300 milliGRAM(s) Oral every 12 hours      Cardiovascular:       Immunologic:       Other medications:   allopurinol 300 milliGRAM(s) Oral daily  dexAMETHasone  Injectable 4 milliGRAM(s) IV Push every 6 hours  HYDROmorphone Infusion 0.3 mG/Hr IV Continuous <Continuous>  levETIRAcetam 500 milliGRAM(s) Oral two times a day  lidocaine   Patch 1 Patch Transdermal every 24 hours  sodium chloride 0.9%. 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    1. Infectious Disease:     2. GI Prophylaxis:    pantoprazole    Tablet 40 milliGRAM(s) Oral before breakfast    3. Mouthcare; Skin care     4. Transfuse & replete electrolytes prn     5. IV hydration, daily weights, strict I&O, prn diuresis     6. PO intake as tolerated, nutrition follow up as needed, MVI, folic acid     7. Antiemetics, anti-diarrhea medications:      8. OOB as tolerated, physical therapy consult if needed     9. Monitor coags / fibrinogen 2x week, vitamin K as needed     10. Monitor closely for clinical changes, monitor for fevers     11. Emotional support provided, plan of care discussed and questions addressed     I have written the above note for Dr. Herrera who performed service with me in the room.   Janett Barrow NP-C ([phone])    I have seen and examined patient with NP, I agree with above note as scribed. [facility] Transplant Team                                                      Critical / Counseling Time Provided: 30 minutes                                                                                                                                                        Chief Complaint: persistent back pain, relapsed disease     S: Patient seen and examined with [facility] Transplant Team:   + back pain   + weakness     O: Vitals:   Vital Signs Last 24 Hrs  T(C): 36.1 (20 May 2020 09:27), Max: 37.1 (19 May 2020 21:45)  T(F): 96.9 (20 May 2020 09:27), Max: 98.7 (19 May 2020 21:45)  HR: 84 (20 May 2020 09:27) (72 - 95)  BP: 123/80 (20 May 2020 09:27) (118/79 - 150/81)  BP(mean): --  RR: 20 (20 May 2020 09:27) (18 - 20)  SpO2: 100% (20 May 2020 09:27) (96% - 100%)    Admit weight: 104.3kg   Today's weight:  104.3kg 5/12/20     Intake / Output:   05-19 @ 07:01  -  05-20 @ 07:00  --------------------------------------------------------  IN: 1766.2 mL / OUT: 2750 mL / NET: -983.8 mL        PE:   General: appears in mild distress   CVS: S1, S2 RRR   Lungs: CTA throughout bilaterally   Abdomen: + BS x 4, soft, NT, ND   Extremities: no edema   Skin: no rash   Neuro: A&O x 3  Pain: chronic low back pain      Labs:                         6.8    0.95  )-----------( 11       ( 20 May 2020 09:16 )             21.0     05-20    139  |  110<H>  |  31<H>  ----------------------------<  159<H>  5.2   |  19<L>  |  0.84    Ca    9.5      20 May 2020 09:16  Phos  4.4     05-20  Mg     2.4     05-20    TPro  6.4  /  Alb  3.2<L>  /  TBili  0.3  /  DBili  <0.1  /  AST  34  /  ALT  49<H>  /  AlkPhos  67  05-20         Cultures:   Culture - CSF with Gram Stain . (05.15.20 @ 16:46)    Gram Stain:   polymorphonuclear leukocytes seen  No organisms seen  by cytocentrifuge    Specimen Source: .CSF CSF    Culture - Urine (05.13.20 @ 01:06)    -  Amikacin: S <=16    -  Cefazolin: S <=8 (MIC_CL_COM_ENTERIC_CEFAZU) For uncomplicated UTI with K. pneumoniae, E. coli, or P. mirablis: DL <=16 is sensitive and DL >=32 is resistant. This also predicts results for oral agents cefaclor, cefdinir, cefpodoxime, cefprozil, cefuroxime axetil, cephalexin and locarbef for uncomplicated UTI. Note that some isolates may be susceptible to these agents while testing resistant to cefazolin.    -  Cefepime: S <=4    -  Cefoxitin: S <=8    -  Ampicillin: S <=8 These ampicillin results predict results for amoxicillin    -  Aztreonam: S <=4    -  Trimethoprim/Sulfamethoxazole: S <=2/38    -  Piperacillin/Tazobactam: S <=16    -  Imipenem: S <=1    -  Tigecycline: S <=2    -  Tobramycin: S <=4    -  Ciprofloxacin: S <=1    -  Gentamicin: S <=4    -  Levofloxacin: S <=2    -  Meropenem: S <=1    -  Nitrofurantoin: S <=32 Should not be used to treat pyelonephritis    -  Ampicillin/Sulbactam: S <=8/4 Enterobacter, Citrobacter, and Serratia may develop resistance during prolonged therapy (3-4 days)    -  Ceftriaxone: S <=1 Enterobacter, Citrobacter, and Serratia may develop resistance during prolonged therapy    Specimen Source: .Urine Clean Catch (Midstream)    Culture Results:   >100,000 CFU/ml Escherichia coli    Organism Identification: Escherichia coli    Organism: Escherichia coli    Method Type: DL    Radiology:   EXAM:  MR BRAIN                        PROCEDURE DATE:  05/14/2020    IMPRESSION:  New mild to moderate acute hydrocephalus present with associated transependymal flow of cerebral spinal fluid.  Abnormal signal involves the subarachnoid space and layering fluid is noted within the ventricular system. Differential considerations include meningitis or carcinomatosis, less likely subarachnoid hemorrhage.   Thickening ofthe pituitary stalk, lower hypothalamus, and optic chiasm is present suspicious for an underlying tumor, infection, or inflammatory process.  Abnormal signal involves the brainstem and portions of the cerebellum as described. Some considerations include a rhombencephalitis, chemotherapy/medication effect, PML, demyelination, versus underlying infectious, neoplastic, or inflammatory lesion.  Edema and swelling is present in the right frontal lobe surrounding the Ommaya reservoir catheter. Thiscould reflect leaking of CSF around the catheter from the ventricular system given the new hydrocephalus, versus underlying infection or mass lesion.  A follow-up brain MRI with contrast is recommended for further workup.    from: MR Thoracic Spine w/wo IV Cont (05.15.20 @ 20:13) >  IMPRESSION: Nonspecific enhancement of the tip of the conus similar to the appearance of the MRI of the lumbar spine 5/12/2020. Differential diagnosis includes infectious, inflammatory and neoplastic processes.    Meds:   Antimicrobials:       Heme / Onc:   cytarabine PF IntraThecal (eMAR) 50 milliGRAM(s) IntraThecal once  imatinib 400 milliGRAM(s) Oral two times a day  methotrexate PF IntraThecal (eMAR) 12 milliGRAM(s) IntraThecal once      GI:  pantoprazole    Tablet 40 milliGRAM(s) Oral before breakfast  polyethylene glycol 3350 17 Gram(s) Oral every 12 hours  senna 2 Tablet(s) Oral every 12 hours  ursodiol Capsule 300 milliGRAM(s) Oral every 12 hours      Cardiovascular:       Immunologic:       Other medications:   allopurinol 300 milliGRAM(s) Oral daily  dexAMETHasone  Injectable 4 milliGRAM(s) IV Push every 6 hours  HYDROmorphone Infusion 0.3 mG/Hr IV Continuous <Continuous>  levETIRAcetam 500 milliGRAM(s) Oral two times a day  lidocaine   Patch 1 Patch Transdermal every 24 hours  sodium chloride 0.9%. 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    1. Infectious Disease:     2. GI Prophylaxis:    pantoprazole    Tablet 40 milliGRAM(s) Oral before breakfast    3. Mouthcare; Skin care     4. Transfuse & replete electrolytes prn   1 unit PRBC     5. IV hydration, daily weights, strict I&O, prn diuresis     6. PO intake as tolerated, nutrition follow up as needed, MVI, folic acid     7. Antiemetics, anti-diarrhea medications:      8. OOB as tolerated, physical therapy consult if needed     9. Monitor coags / fibrinogen 2x week, vitamin K as needed     10. Monitor closely for clinical changes, monitor for fevers     11. Emotional support provided, plan of care discussed and questions addressed     I have written the above note for Dr. Herrera who performed service with me in the room.   Janett Barrow NP-C ([phone])    I have seen and examined patient with NP, I agree with above note as scribed.

## 2020-05-20 NOTE — PROGRESS NOTE ADULT - PROBLEM SELECTOR PLAN 6
Actions:  [ ] Rapport building     [x] Symptom assessment    [] Eliciting preferences of goals   [] Prognostic understanding    [] Emotional Support  [] Coping skill development  []  Other  Interdisciplinary Referrals: Holistic nurse  Communication:  d/w NP team  Documentation Review: [x] Primary Team [] Consultants [] Interdisciplinary team

## 2020-05-20 NOTE — PROGRESS NOTE ADULT - PROBLEM SELECTOR PLAN 2
Predominant symptom: LE/back pain   DDx includes spinal involvement of leukemia/OA/Chronic  Degree of control: Well controlled  Relative to previous day: Still controlled  Current treatment regimen: dilaudid 0.3 gtt  Recommendations: may be chronic  begin 0.2mg IV dilaudid/hr  PRN IV dilaudid 0.4 mg q2H   NSAIDs contraindicated at this time.  Would avoid ATC APAP in this patient  Risk mitigation/Bowel regimen: avoid BZD/hypnotics/respiratory depressants, if no improvement, may consider morphine rotation should renal function not preclude it. Moreover, if spinal involvement of leukemia, may transition to methadone if escalating doses unhelpful. Bowel regimen changed. See below  Adverse events noted: none a this time  Maintain holistic nurse to assist with CAM therapies

## 2020-05-21 DIAGNOSIS — Z71.89 OTHER SPECIFIED COUNSELING: ICD-10-CM

## 2020-05-21 LAB
ALBUMIN SERPL ELPH-MCNC: 3.3 G/DL — SIGNIFICANT CHANGE UP (ref 3.3–5)
ALP SERPL-CCNC: 67 U/L — SIGNIFICANT CHANGE UP (ref 40–120)
ALT FLD-CCNC: 94 U/L — HIGH (ref 10–45)
ANION GAP SERPL CALC-SCNC: 11 MMOL/L — SIGNIFICANT CHANGE UP (ref 5–17)
APTT BLD: 21.6 SEC — LOW (ref 27.5–36.3)
AST SERPL-CCNC: 56 U/L — HIGH (ref 10–40)
BILIRUB SERPL-MCNC: 0.6 MG/DL — SIGNIFICANT CHANGE UP (ref 0.2–1.2)
BUN SERPL-MCNC: 34 MG/DL — HIGH (ref 7–23)
CALCIUM SERPL-MCNC: 9.8 MG/DL — SIGNIFICANT CHANGE UP (ref 8.4–10.5)
CHLORIDE SERPL-SCNC: 110 MMOL/L — HIGH (ref 96–108)
CO2 SERPL-SCNC: 18 MMOL/L — LOW (ref 22–31)
CREAT SERPL-MCNC: 0.85 MG/DL — SIGNIFICANT CHANGE UP (ref 0.5–1.3)
FIBRINOGEN PPP-MCNC: 250 MG/DL — LOW (ref 350–510)
GLUCOSE SERPL-MCNC: 230 MG/DL — HIGH (ref 70–99)
HCT VFR BLD CALC: 21.1 % — LOW (ref 34.5–45)
HGB BLD-MCNC: 7.2 G/DL — LOW (ref 11.5–15.5)
INR BLD: 0.92 RATIO — SIGNIFICANT CHANGE UP (ref 0.88–1.16)
LDH SERPL L TO P-CCNC: 176 U/L — SIGNIFICANT CHANGE UP (ref 50–242)
MAGNESIUM SERPL-MCNC: 2.3 MG/DL — SIGNIFICANT CHANGE UP (ref 1.6–2.6)
MCHC RBC-ENTMCNC: 31.2 PG — SIGNIFICANT CHANGE UP (ref 27–34)
MCHC RBC-ENTMCNC: 34.1 GM/DL — SIGNIFICANT CHANGE UP (ref 32–36)
MCV RBC AUTO: 91.3 FL — SIGNIFICANT CHANGE UP (ref 80–100)
NRBC # BLD: 0 /100 WBCS — SIGNIFICANT CHANGE UP (ref 0–0)
PHOSPHATE SERPL-MCNC: 4.1 MG/DL — SIGNIFICANT CHANGE UP (ref 2.5–4.5)
PLATELET # BLD AUTO: 34 K/UL — LOW (ref 150–400)
PLATELET # BLD AUTO: 8 K/UL — CRITICAL LOW (ref 150–400)
POTASSIUM SERPL-MCNC: 4.5 MMOL/L — SIGNIFICANT CHANGE UP (ref 3.5–5.3)
POTASSIUM SERPL-SCNC: 4.5 MMOL/L — SIGNIFICANT CHANGE UP (ref 3.5–5.3)
PROT SERPL-MCNC: 6.1 G/DL — SIGNIFICANT CHANGE UP (ref 6–8.3)
PROTHROM AB SERPL-ACNC: 10.5 SEC — SIGNIFICANT CHANGE UP (ref 10–12.9)
RBC # BLD: 2.31 M/UL — LOW (ref 3.8–5.2)
RBC # FLD: 16.6 % — HIGH (ref 10.3–14.5)
SODIUM SERPL-SCNC: 139 MMOL/L — SIGNIFICANT CHANGE UP (ref 135–145)
URATE SERPL-MCNC: 5.5 MG/DL — SIGNIFICANT CHANGE UP (ref 2.5–7)
WBC # BLD: 0.53 K/UL — CRITICAL LOW (ref 3.8–10.5)
WBC # FLD AUTO: 0.53 K/UL — CRITICAL LOW (ref 3.8–10.5)

## 2020-05-21 PROCEDURE — 99233 SBSQ HOSP IP/OBS HIGH 50: CPT

## 2020-05-21 PROCEDURE — 36573 INSJ PICC RS&I 5 YR+: CPT

## 2020-05-21 PROCEDURE — 99291 CRITICAL CARE FIRST HOUR: CPT

## 2020-05-21 RX ORDER — CHLORHEXIDINE GLUCONATE 213 G/1000ML
1 SOLUTION TOPICAL
Refills: 0 | Status: DISCONTINUED | OUTPATIENT
Start: 2020-05-21 | End: 2020-05-23

## 2020-05-21 RX ORDER — SODIUM CHLORIDE 9 MG/ML
10 INJECTION INTRAMUSCULAR; INTRAVENOUS; SUBCUTANEOUS
Refills: 0 | Status: DISCONTINUED | OUTPATIENT
Start: 2020-05-21 | End: 2020-05-23

## 2020-05-21 RX ORDER — LANOLIN ALCOHOL/MO/W.PET/CERES
5 CREAM (GRAM) TOPICAL AT BEDTIME
Refills: 0 | Status: DISCONTINUED | OUTPATIENT
Start: 2020-05-21 | End: 2020-05-23

## 2020-05-21 RX ORDER — ACETAMINOPHEN 500 MG
1000 TABLET ORAL ONCE
Refills: 0 | Status: COMPLETED | OUTPATIENT
Start: 2020-05-21 | End: 2020-05-21

## 2020-05-21 RX ADMIN — Medication 4 MILLIGRAM(S): at 13:08

## 2020-05-21 RX ADMIN — ONDANSETRON 8 MILLIGRAM(S): 8 TABLET, FILM COATED ORAL at 23:33

## 2020-05-21 RX ADMIN — SODIUM CHLORIDE 75 MILLILITER(S): 9 INJECTION INTRAMUSCULAR; INTRAVENOUS; SUBCUTANEOUS at 06:09

## 2020-05-21 RX ADMIN — SENNA PLUS 2 TABLET(S): 8.6 TABLET ORAL at 05:27

## 2020-05-21 RX ADMIN — URSODIOL 300 MILLIGRAM(S): 250 TABLET, FILM COATED ORAL at 18:30

## 2020-05-21 RX ADMIN — IMATINIB MESYLATE 400 MILLIGRAM(S): 400 TABLET, FILM COATED ORAL at 05:26

## 2020-05-21 RX ADMIN — URSODIOL 300 MILLIGRAM(S): 250 TABLET, FILM COATED ORAL at 05:27

## 2020-05-21 RX ADMIN — IMATINIB MESYLATE 400 MILLIGRAM(S): 400 TABLET, FILM COATED ORAL at 18:30

## 2020-05-21 RX ADMIN — Medication 400 MILLIGRAM(S): at 23:32

## 2020-05-21 RX ADMIN — Medication 300 MILLIGRAM(S): at 13:06

## 2020-05-21 RX ADMIN — HYDROMORPHONE HYDROCHLORIDE 0.3 MILLIGRAM(S): 2 INJECTION INTRAMUSCULAR; INTRAVENOUS; SUBCUTANEOUS at 19:44

## 2020-05-21 RX ADMIN — SENNA PLUS 2 TABLET(S): 8.6 TABLET ORAL at 18:31

## 2020-05-21 RX ADMIN — LACTULOSE 10 GRAM(S): 10 SOLUTION ORAL at 13:08

## 2020-05-21 RX ADMIN — POLYETHYLENE GLYCOL 3350 17 GRAM(S): 17 POWDER, FOR SOLUTION ORAL at 18:31

## 2020-05-21 RX ADMIN — Medication 4 MILLIGRAM(S): at 18:31

## 2020-05-21 RX ADMIN — LEVETIRACETAM 500 MILLIGRAM(S): 250 TABLET, FILM COATED ORAL at 18:30

## 2020-05-21 RX ADMIN — Medication 1 MILLIGRAM(S): at 13:06

## 2020-05-21 RX ADMIN — LACTULOSE 10 GRAM(S): 10 SOLUTION ORAL at 21:27

## 2020-05-21 RX ADMIN — LIDOCAINE 1 PATCH: 4 CREAM TOPICAL at 21:24

## 2020-05-21 RX ADMIN — LIDOCAINE 1 PATCH: 4 CREAM TOPICAL at 08:54

## 2020-05-21 RX ADMIN — LIDOCAINE 1 PATCH: 4 CREAM TOPICAL at 08:50

## 2020-05-21 RX ADMIN — POLYETHYLENE GLYCOL 3350 17 GRAM(S): 17 POWDER, FOR SOLUTION ORAL at 05:27

## 2020-05-21 RX ADMIN — PANTOPRAZOLE SODIUM 40 MILLIGRAM(S): 20 TABLET, DELAYED RELEASE ORAL at 05:29

## 2020-05-21 RX ADMIN — LEVETIRACETAM 500 MILLIGRAM(S): 250 TABLET, FILM COATED ORAL at 05:26

## 2020-05-21 RX ADMIN — HYDROMORPHONE HYDROCHLORIDE 0.3 MILLIGRAM(S): 2 INJECTION INTRAMUSCULAR; INTRAVENOUS; SUBCUTANEOUS at 22:10

## 2020-05-21 RX ADMIN — Medication 4 MILLIGRAM(S): at 05:26

## 2020-05-21 RX ADMIN — LACTULOSE 10 GRAM(S): 10 SOLUTION ORAL at 05:27

## 2020-05-21 NOTE — PROGRESS NOTE ADULT - SUBJECTIVE AND OBJECTIVE BOX
61y/o F with relapsed ALL referred for PICC placement for chemotherapy.    Allergies:No Known Drug Allergies  shellfish (Nausea; Vomiting)      PAST MEDICAL & SURGICAL HISTORY:  Herpes zoster  Leukemia  Clostridium difficile diarrhea  Intractable migraine with status migrainosus, unspecified migraine type  Sciatica of right side  Chronic gastritis, presence of bleeding unspecified, unspecified gastritis type  Essential hypertension  Lipoma: left side  Elective surgical procedure: ommaya placement  History of  delivery        Pertinent labs:                      x      x     )-----------( 34       ( 21 May 2020 09:38 )             x      -    139  |  110<H>  |  34<H>  ----------------------------<  230<H>  4.5   |  18<L>  |  0.85    Ca    9.8      21 May 2020 09:38  Phos  4.1       Mg     2.3         TPro  6.1  /  Alb  3.3  /  TBili  0.6  /  DBili  x   /  AST  56<H>  /  ALT  94<H>  /  AlkPhos  67  -  PT/INR - ( 21 May 2020 09:38 )   PT: 10.5 sec;   INR: 0.92 ratio         PTT - ( 21 May 2020 09:38 )  PTT:21.6 sec    Consent: Procedure/risks/ Benefits explained. Informed consent obtained. Pt verbalizes understanding.

## 2020-05-21 NOTE — PROGRESS NOTE ADULT - PROBLEM SELECTOR PLAN 2
Predominant symptom: LE/back pain   spinal involvement of leukemia +/- Chronic pain  Degree of control: Well controlled  Relative to previous day: Still controlled  Current treatment regimen: dilaudid 0.2 gtt  Recommendations: maintain dilaudid 0.2 gtt  Therefore, 0.2mg IV dilaudid/hr and PRN IV dilaudid 0.4 mg q2H   NSAIDs contraindicated at this time.  Would avoid ATC APAP in this patient  Risk mitigation/Bowel regimen:   -avoid BZD/hypnotics/respiratory depressants  -monitor for changes in CrCl due to TLS, which would require dosing/administration changes  -As IT ChRx treatment begins to take effect, the need for opioids may decrease so monitor for new or evolving sedation/lethargy   Bowel regimen changed. See below  Adverse events noted: none apparent at this time  Maintain holistic nurse to assist with CAM therapies

## 2020-05-21 NOTE — PROGRESS NOTE ADULT - ATTENDING COMMENTS
In the event of newly developing, evolving, or worsening symptoms, please contact the Palliative Medicine team via pager (if the patient is at [facility] #[phone] or if the patient is at [facility] #[phone]) The Geriatric and Palliative Medicine service has coverage 24 hours a day/ 7 days a week to provide medical recommendations regarding symptom management needs via telephone

## 2020-05-21 NOTE — PROGRESS NOTE ADULT - PROBLEM SELECTOR PLAN 6
Actions:  [ ] Rapport building     [x] Symptom assessment    [] Eliciting preferences of goals   [] Prognostic understanding    [] Emotional Support  [] Coping skill development  []  Other  Interdisciplinary Referrals: Holistic nurse  Communication:  d/w nursing team  Documentation Review: [x] Primary Team [] Consultants [] Interdisciplinary team

## 2020-05-21 NOTE — PROGRESS NOTE ADULT - ATTENDING COMMENTS
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 Cont IV fluids  -monitor for tumor lysis syndrome. On Allopurinol,  -pt has Ommaya s/p IT Mtx given on 5/15 -showed 19% 'other' cells likely due to CSF involvement, flow cytometry showed 84% blasts. Plan 2x/wk IT chemo, on Tue/Friday's. Given Cytarabine 50mg IT on 5/19, f/u flow cytometry  -BM bx done on 5/15 -showed 54% blasts  -send mutation analysis including T315I and consider Ponatinib  -UTI on admission -started on Ceftriaxone. 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 Cont IV fluids  -monitor for tumor lysis syndrome. On Allopurinol,  -pt has Ommaya s/p IT Mtx given on 5/15 -showed 19% 'other' cells likely due to CSF involvement, flow cytometry showed 84% blasts. Plan 2x/wk IT chemo, on Tue/Friday's. Given Cytarabine 50mg IT on 5/19,  flow cytometry showed 40% c/w improvement. 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 COVID19 neg on 5/12, 5/16  -palliative care consult

## 2020-05-21 NOTE — CHART NOTE - NSCHARTNOTEFT_GEN_A_CORE
Nutrition Follow Up Note  Patient seen for: Nutrition follow up. Pt admitted with back pain and found to have relapsed ALL, plan for treatment, biweekly intrathecal chemotherapy    Source: Pt    Diet : Regular diet with ensure enlive 1 daily     Patient reports: Pt reports nausea at times, episode of vomiting this morning, last BM noted yesterday.      PO intake : Pt reports appetite has been decreased partially due to dislike of institutional foods, stated she was not able to complete her meals noted % meal completion, pt will drink ensure enlive 1 daily. Pt reports preference for lighter foods and dislikes meats, pt requesting yogurt, lactaid milk and cereal and various fruits for lunch and dinner and feels she will be able to complete this.       Weight: 229.5 lbs (5/12), 227 lbs (5/20), weight slightly decreased     Pertinent Medications: MEDICATIONS  (STANDING):  allopurinol 300 milliGRAM(s) Oral daily  cytarabine PF IntraThecal (eMAR) 50 milliGRAM(s) IntraThecal once  dexAMETHasone  Injectable 4 milliGRAM(s) IV Push every 6 hours  HYDROmorphone Infusion 0.2 mG/Hr (0.2 mL/Hr) IV Continuous <Continuous>  imatinib 400 milliGRAM(s) Oral two times a day  lactulose Syrup 10 Gram(s) Oral every 8 hours  levETIRAcetam 500 milliGRAM(s) Oral two times a day  lidocaine   Patch 1 Patch Transdermal every 24 hours  LORazepam   Injectable 1 milliGRAM(s) IV Push once  methotrexate PF IntraThecal (eMAR) 12 milliGRAM(s) IntraThecal once  pantoprazole    Tablet 40 milliGRAM(s) Oral before breakfast  polyethylene glycol 3350 17 Gram(s) Oral every 12 hours  senna 2 Tablet(s) Oral every 12 hours  sodium chloride 0.9%. 500 milliLiter(s) (75 mL/Hr) IV Continuous <Continuous>  ursodiol Capsule 300 milliGRAM(s) Oral every 12 hours    MEDICATIONS  (PRN):  HYDROmorphone  Injectable 0.3 milliGRAM(s) IV Push every 2 hours PRN Moderate Pain (4 - 6) or severe pain  naloxone Injectable 0.1 milliGRAM(s) IV Push every 3 minutes PRN for sedation or RR < 10  ondansetron Injectable 8 milliGRAM(s) IV Push every 8 hours PRN Nausea and/or Vomiting    Pertinent Labs: 05-21 @ 09:38: Na 139, BUN 34<H>, Cr 0.85, <H>, K+ 4.5, Phos 4.1, Mg 2.3, Alk Phos 67, ALT/SGPT 94<H>, AST/SGOT 56<H>, HbA1c --    Finger Sticks: none       Skin per nursing documentation: free of pressure injury   Edema: none     Estimated Needs:   [x ] no change since previous assessment  [ ] recalculated:     Previous Nutrition Diagnosis: Overweight/obesity   Nutrition Diagnosis is: ongoing, weight management not appropriate at this time     New Nutrition Diagnosis:  Related to:    As evidenced by:      Interventions:     Recommend  1) Continue regular diet with ensure enlive 1 daily   2) Continue to encourage po intake and obtain/honor food preferences as able-RD obtained lunch and dinner selections today    Monitoring and Evaluation:     Continue to monitor Nutritional intake, Tolerance to diet prescription, weights, labs, skin integrity    RD remains available upon request and will follow up per protocol    Zofia Salgado R.D., [facility], Pager #[phone]

## 2020-05-21 NOTE — PROGRESS NOTE ADULT - PROBLEM SELECTOR PLAN 1
UE acral distribution and accompanying weakness/LE findings  ALL relapse in CSF may account for atypical/multilevel distribution of neurologic signs: UE/weakness/gat issues

## 2020-05-21 NOTE — PROGRESS NOTE ADULT - SUBJECTIVE AND OBJECTIVE BOX
HPC Transplant Team                                                      Critical / Counseling Time Provided: 30 minutes                                                                                                                                                        Chief Complaint:     S: Patient seen and examined with [facility] Transplant Team:   Denies mouth / tongue / throat pain, dyspnea, cough, nausea, vomiting, diarrhea, abdominal pain     O: Vitals:   Vital Signs Last 24 Hrs  T(C): 36.6 (21 May 2020 07:14), Max: 36.6 (21 May 2020 07:14)  T(F): 97.9 (21 May 2020 07:14), Max: 97.9 (21 May 2020 07:14)  HR: 76 (21 May 2020 07:14) (69 - 85)  BP: 141/76 (21 May 2020 07:14) (118/62 - 141/76)  BP(mean): --  RR: 20 (21 May 2020 07:14) (19 - 20)  SpO2: 100% (21 May 2020 07:14) (100% - 100%)    Admit weight:   Daily     Daily Weight in k (20 May 2020 09:27)    Intake / Output:    @ 07: @ 07:00  --------------------------------------------------------  IN: 2317.3 mL / OUT: 2550 mL / NET: -232.7 mL     @ 07:01  -   @ 09:04  --------------------------------------------------------  IN: 0 mL / OUT: 250 mL / NET: -250 mL          PE:   Oropharynx:   Oral Mucositis:                                                        Grade:   CVS:   Lungs:   Abdomen:  Extremities:   Gastric Mucositis:                                                  Grade:   Intestinal Mucositis:                                              Grade:   Skin:   TLC:   Neuro:   Pain:     Labs:   CBC Full  -  ( 21 May 2020 05:57 )  WBC Count : 0.53 K/uL  Hemoglobin : 7.2 g/dL  Hematocrit : 21.1 %  Platelet Count - Automated : 8 K/uL  Mean Cell Volume : 91.3 fl  Mean Cell Hemoglobin : 31.2 pg  Mean Cell Hemoglobin Concentration : 34.1 gm/dL  Auto Neutrophil # : x  Auto Lymphocyte # : x  Auto Monocyte # : x  Auto Eosinophil # : x  Auto Basophil # : x  Auto Neutrophil % : x  Auto Lymphocyte % : x  Auto Monocyte % : x  Auto Eosinophil % : x  Auto Basophil % : x                          7.2    0.53  )-----------( 8        ( 21 May 2020 05:57 )             21.1     05-20    139  |  110<H>  |  31<H>  ----------------------------<  159<H>  5.2   |  19<L>  |  0.84    Ca    9.5      20 May 2020 09:16  Phos  4.4     -  Mg     2.4     -    TPro  6.4  /  Alb  3.2<L>  /  TBili  0.3  /  DBili  <0.1  /  AST  34  /  ALT  49<H>  /  AlkPhos  67  -20      LIVER FUNCTIONS - ( 20 May 2020 09:16 )  Alb: 3.2 g/dL / Pro: 6.4 g/dL / ALK PHOS: 67 U/L / ALT: 49 U/L / AST: 34 U/L / GGT: x           Lactate Dehydrogenase, Serum: 200 U/L ( @ 09:16)          Karnofsky / Lansky Scale:   GVHD:   Skin:   Liver:   Gut:   Overall Grade:       Cultures:         Radiology:       Meds:   Antimicrobials:       Heme / Onc:   cytarabine PF IntraThecal (eMAR) 50 milliGRAM(s) IntraThecal once  imatinib 400 milliGRAM(s) Oral two times a day  methotrexate PF IntraThecal (eMAR) 12 milliGRAM(s) IntraThecal once      GI:  lactulose Syrup 10 Gram(s) Oral every 8 hours  pantoprazole    Tablet 40 milliGRAM(s) Oral before breakfast  polyethylene glycol 3350 17 Gram(s) Oral every 12 hours  senna 2 Tablet(s) Oral every 12 hours  ursodiol Capsule 300 milliGRAM(s) Oral every 12 hours      Cardiovascular:       Immunologic:       Other medications:   allopurinol 300 milliGRAM(s) Oral daily  dexAMETHasone  Injectable 4 milliGRAM(s) IV Push every 6 hours  HYDROmorphone Infusion 0.2 mG/Hr IV Continuous <Continuous>  levETIRAcetam 500 milliGRAM(s) Oral two times a day  lidocaine   Patch 1 Patch Transdermal every 24 hours  LORazepam   Injectable 1 milliGRAM(s) IV Push once  sodium chloride 0.9%. 500 milliLiter(s) IV Continuous <Continuous>      PRN:   HYDROmorphone  Injectable 0.3 milliGRAM(s) IV Push every 2 hours PRN  naloxone Injectable 0.1 milliGRAM(s) IV Push every 3 minutes PRN  ondansetron Injectable 8 milliGRAM(s) IV Push every 8 hours PRN      A/P:   ___ year old ___  with a history of ______________________  Pre / Status Post :  Autologous / Allogeneic PBSCT / BMT day ____________    1. Infectious Disease:   Fluconazole, Acyclovir     2. VOD Prophylaxis: Actigall, Glutamine, Heparin (dosed at 100 units / kg / day)     3. GI Prophylaxis:  Protonix    4. Mouthcare - NS / NaHCO3 rinses, Mycelex, Caphosol, skin care     5. GVHD prophylaxis     6. Transfuse & replete electrolytes prn     7. IV hydration, daily weights, strict I&O, prn diuresis     8. PO intake as tolerated, nutrition follow up as needed, MVI, folic acid     9. Antiemetics, anti-diarrhea medications:   Reglan, Ativan    10. OOB as tolerated, physical therapy consult if needed     11. Monitor coags / fibrinogen 2x week, vitamin K as needed     12. Monitor closely for clinical changes, monitor for fevers     13. Emotional support provided, plan of care discussed with patient and family, questions addressed     14. Patient education done regarding chemotherapy prep, plan of care, restrictions and discharge planning     15. Continue regular social work input     I have written the above note for Dr. Logan who performed service with me in the room.   Cedric Hernadez  NP-C ([phone])    I have seen and examined patient with NP, I agree with above note as scribed. HPC Transplant Team                                                      Critical / Counseling Time Provided: 30 minutes                                                                                                                                                        Chief Complaint: persistent back pain, relapsed disease     S: Patient seen and examined with [facility] Transplant Team:   + back pain   + weakness     Denies any nausea, vomiting, diarrhea, chest pain or SOB.    O: Vitals:   Vital Signs Last 24 Hrs  T(C): 36.6 (21 May 2020 07:14), Max: 36.6 (21 May 2020 07:14)  T(F): 97.9 (21 May 2020 07:14), Max: 97.9 (21 May 2020 07:14)  HR: 76 (21 May 2020 07:14) (69 - 85)  BP: 141/76 (21 May 2020 07:14) (118/62 - 141/76)  BP(mean): --  RR: 20 (21 May 2020 07:14) (19 - 20)  SpO2: 100% (21 May 2020 07:14) (100% - 100%)    Admit weight:   Daily Weight in k (20 May 2020 09:27)    Intake / Output:    @ 07:  -   @ 07:00  --------------------------------------------------------  IN: 2317.3 mL / OUT: 2550 mL / NET: -232.7 mL     @ 07:01  -   @ 09:04  --------------------------------------------------------  IN: 0 mL / OUT: 250 mL / NET: -250 mL    PE:   General: appears in mild distress   CVS: S1, S2 RRR   Lungs: CTA throughout bilaterally   Abdomen: + BS x 4, soft, NT, ND   Extremities: no edema   Skin: no rash   Neuro: A&O x 3  Pain: chronic low back pain    Labs:   CBC Full  -  ( 21 May 2020 05:57 )  WBC Count : 0.53 K/uL  Hemoglobin : 7.2 g/dL  Hematocrit : 21.1 %  Platelet Count - Automated : 8 K/uL  Mean Cell Volume : 91.3 fl  Mean Cell Hemoglobin : 31.2 pg  Mean Cell Hemoglobin Concentration : 34.1 gm/dL  Auto Neutrophil # : x  Auto Lymphocyte # : x  Auto Monocyte # : x  Auto Eosinophil # : x  Auto Basophil # : x  Auto Neutrophil % : x  Auto Lymphocyte % : x  Auto Monocyte % : x  Auto Eosinophil % : x  Auto Basophil % : x                          7.2    0.53  )-----------( 8        ( 21 May 2020 05:57 )             21.1         139  |  110<H>  |  31<H>  ----------------------------<  159<H>  5.2   |  19<L>  |  0.84    Ca    9.5      20 May 2020 09:16  Phos  4.4       Mg     2.4         TPro  6.4  /  Alb  3.2<L>  /  TBili  0.3  /  DBili  <0.1  /  AST  34  /  ALT  49<H>  /  AlkPhos  67        LIVER FUNCTIONS - ( 20 May 2020 09:16 )  Alb: 3.2 g/dL / Pro: 6.4 g/dL / ALK PHOS: 67 U/L / ALT: 49 U/L / AST: 34 U/L / GGT: x           Lactate Dehydrogenase, Serum: 200 U/L ( @ 09:16)    Cultures:   Culture - CSF with Gram Stain . (05.15.20 @ 16:46)    Gram Stain:   polymorphonuclear leukocytes seen  No organisms seen  by cytocentrifuge    Specimen Source: .CSF CSF    Culture Results:   No growth at 3 days.    Radiology:    MR Cervical Spine w/ IV Cont (20 @ 11:58) >  The vertebral bodies which previously were shown to be diffusely abnormal in signal demonstrate diffuse contrast enhancement suspicious for tumor infiltration although red marrow conversion is a possibility.  No intraspinal abnormal enhancement.  Spondylosis with mild C5-C6 right-sided cord flattening as seen previously.    Meds:   Antimicrobials:     Heme / Onc:   cytarabine PF IntraThecal (eMAR) 50 milliGRAM(s) IntraThecal once  imatinib 400 milliGRAM(s) Oral two times a day  methotrexate PF IntraThecal (eMAR) 12 milliGRAM(s) IntraThecal once    GI:  lactulose Syrup 10 Gram(s) Oral every 8 hours  pantoprazole    Tablet 40 milliGRAM(s) Oral before breakfast  polyethylene glycol 3350 17 Gram(s) Oral every 12 hours  senna 2 Tablet(s) Oral every 12 hours  ursodiol Capsule 300 milliGRAM(s) Oral every 12 hours    Other medications:   allopurinol 300 milliGRAM(s) Oral daily  dexAMETHasone  Injectable 4 milliGRAM(s) IV Push every 6 hours  HYDROmorphone Infusion 0.2 mG/Hr IV Continuous <Continuous>  levETIRAcetam 500 milliGRAM(s) Oral two times a day  lidocaine   Patch 1 Patch Transdermal every 24 hours  LORazepam   Injectable 1 milliGRAM(s) IV Push once  sodium chloride 0.9%. 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  - PICC line placement in IR today.    1. GI Prophylaxis:    pantoprazole    Tablet 40 milliGRAM(s) Oral before breakfast    2. Mouth care, skin care    3. Transfuse & replete electrolytes prn   Transfuse one unit platelets today in preparation for PICC line placement.    4. IV hydration, daily weights, strict I&O, prn diuresis     5. PO intake as tolerated, nutrition follow up as needed, MVI, folic acid     6. Antiemetics, anti-diarrhea medications:     7. OOB as tolerated, physical therapy consult if needed     8. Monitor coags / fibrinogen 2x week, vitamin K as needed     9. Monitor closely for clinical changes, monitor for fevers     10. Emotional support provided, plan of care discussed with patient and family, questions addressed     11. Patient education done regarding  plan of care, restrictions and discharge planning     12. Continue regular social work input     I have written the above note for Dr. Herrera who performed service with me in the room.   Cedric Hernadez  NP-C ([phone])    I have seen and examined patient with NP, I agree with above note as scribed.

## 2020-05-21 NOTE — PROGRESS NOTE ADULT - SUBJECTIVE AND OBJECTIVE BOX
Overnight events: No changes in analgesia, well controlled  Staff reports: No major issues  Patient: She is comfortable, still with paresthesias  PRN use: negligible        RECENT VITALS/LABS/MEDICATIONS/ASSAYS     20 @ 12:22    T(C): 37 (20 @ 09:15), Max: 37 (20 @ 09:15)  HR: 81 (20 @ 09:15) (69 - 85)  BP: 136/82 (20 @ 09:15) (119/78 - 141/76)  RR: 20 (20 @ 09:15) (20 - 20)  SpO2: 100% (20 @ 09:15) (100% - 100%)  Wt(kg): --      20 May 2020 07:  -  21 May 2020 07:00  --------------------------------------------------------  IN:    HYDROmorphone  Infusion: 2 mL    HYDROmorphone  Infusion: 3.3 mL    Oral Fluid: 910 mL    Packed Red Blood Cells: 352 mL    sodium chloride 0.9%.: 1050 mL  Total IN: 2317.3 mL    OUT:    Voided: 2550 mL  Total OUT: 2550 mL    Total NET: -232.7 mL      21 May 2020 07:  -  21 May 2020 12:22  --------------------------------------------------------  IN:  Total IN: 0 mL    OUT:    Voided: 250 mL  Total OUT: 250 mL    Total NET: -250 mL           @ 07:01  -   @ 07:00  --------------------------------------------------------  IN: 2317.3 mL / OUT: 2550 mL / NET: -232.7 mL    05-21 @ 07:01  -   @ 12:22  --------------------------------------------------------  IN: 0 mL / OUT: 250 mL / NET: -250 mL      CAPILLARY BLOOD GLUCOSE            allopurinol 300 milliGRAM(s) Oral daily  cytarabine PF IntraThecal (eMAR) 50 milliGRAM(s) IntraThecal once  dexAMETHasone  Injectable 4 milliGRAM(s) IV Push every 6 hours  HYDROmorphone  Injectable 0.3 milliGRAM(s) IV Push every 2 hours PRN  HYDROmorphone Infusion 0.2 mG/Hr IV Continuous <Continuous>  imatinib 400 milliGRAM(s) Oral two times a day  lactulose Syrup 10 Gram(s) Oral every 8 hours  levETIRAcetam 500 milliGRAM(s) Oral two times a day  lidocaine   Patch 1 Patch Transdermal every 24 hours  LORazepam   Injectable 1 milliGRAM(s) IV Push once  methotrexate PF IntraThecal (eMAR) 12 milliGRAM(s) IntraThecal once  naloxone Injectable 0.1 milliGRAM(s) IV Push every 3 minutes PRN  ondansetron Injectable 8 milliGRAM(s) IV Push every 8 hours PRN  pantoprazole    Tablet 40 milliGRAM(s) Oral before breakfast  polyethylene glycol 3350 17 Gram(s) Oral every 12 hours  senna 2 Tablet(s) Oral every 12 hours  sodium chloride 0.9%. 500 milliLiter(s) IV Continuous <Continuous>  ursodiol Capsule 300 milliGRAM(s) Oral every 12 hours              139  |  110<H>  |  34<H>  ----------------------------<  230<H>  4.5   |  18<L>  |  0.85    Ca    9.8      21 May 2020 09:38  Phos  4.1       Mg     2.3         TPro  6.1  /  Alb  3.3  /  TBili  0.6  /  DBili  x   /  AST  56<H>  /  ALT  94<H>  /  AlkPhos  67        Procalc  BNP  ABG                          x      x     )-----------( 34       ( 21 May 2020 09:38 )             x      PT/INR - ( 21 May 2020 09:38 )   PT: 10.5 sec;   INR: 0.92 ratio         PTT - ( 21 May 2020 09:38 )  PTT:21.6 sec        blood and urine cultures                      PERTINENT PM/SXH:   Herpes zoster  Leukemia  Clostridium difficile diarrhea  Intractable migraine with status migrainosus, unspecified migraine type  Sciatica of right side  Chronic gastritis, presence of bleeding unspecified, unspecified gastritis type  Essential hypertension    Lipoma  Elective surgical procedure  History of  delivery    FAMILY HISTORY:  No pertinent family history in first degree relatives    ITEMS NOT CHECKED ARE NOT PRESENT    SOCIAL HISTORY:   Significant other/partner[ ]  Children[x ]  Spiritism/Spirituality:  Substance hx:  [ ]   Tobacco hx:  [ ]   Alcohol hx: [ ]   Home Opioid hx:  [ ] I-Stop Reference No:  Living Situation: [ x]Home  [ ]Long term care  [ ]Rehab [ ]Other    ADVANCE DIRECTIVES:    DNR  MOLST  [ ]  Living Will  [ ]   DECISION MAKER(s):  [ ] Health Care Proxy(s)  [x ] Surrogate(s)  [ ] Guardian           Name(s): Phone Number(s):    BASELINE (I)ADL(s) (prior to admission):  [place]: [ ]Total  [ ] Moderate [ ]Dependent    Allergies    No Known Drug Allergies  shellfish (Nausea; Vomiting)    Intolerances    MEDICATIONS  (STANDING):       PRESENT SYMPTOMS: [ ]Unable to obtain due to poor mentation   Source if other than patient:  [ ]Family   [ ]Team     Pain: [x ]yes [ ]no  QOL impact -  See above  Location -                    Aggravating factors -  Quality -  Radiation -  Timing-  Severity (0-10 scale):  Minimal acceptable level (0-10 scale):     PAIN AD Score:     [email or web address].pdf (press ctrl +  left click to view)    Dyspnea:                           [ ]Mild [ ]Moderate [ ]Severe  Anxiety:                             [ ]Mild [ ]Moderate [ ]Severe  Fatigue:                             [ ]Mild [ ]Moderate [ ]Severe  Nausea:                             [ ]Mild [ ]Moderate [ ]Severe  Loss of appetite:              [ ]Mild [ ]Moderate [ ]Severe  Constipation:                    [x ]Mild [ ]Moderate [ ]Severe    Other Symptoms:  [x ]All other review of systems negative     Palliative Performance Status Version 2:      40   %    http://npcrc.org/files/news/palliative_performance_scale_ppsv2.pdf       GENERAL:  [x ]Alert  [x ]Oriented x  3  [ ]Lethargic  [ ]Cachexia  [ ]Unarousable  [ ]Verbal  [ ]Non-Verbal  Behavioral:   [ ] Anxiety  [ ] Delirium [ ] Agitation [x ] Other Calm  HEENT:  [ ]Normal   [ x]Dry mouth   [ ]ET Tube/Trach  [ ]Oral lesions  PULMONARY:   [  ]Clear [ ]Tachypnea  [ ]Audible excessive secretions   [ ]Rhonchi        [ ]Right [ ]Left [ ]Bilateral  [ ]Crackles        [ ]Right [ ]Left [ ]Bilateral  [ ]Wheezing     [ ]Right [ ]Left [ ]Bilatera  [x ]Diminished breath sounds [ ]right [ ]left [ ]bilateral  CARDIOVASCULAR:    [ x]Regular [ ]Irregular [ ]Tachy  [ ]Ellis [ ]Murmur [ ]Other  GASTROINTESTINAL:  [ x]Soft  [ x]Distended   [ ]+BS  [x ]Non tender [ ]Tender  [ ]PEG [ ]OGT/ NGT  Last BM:     GENITOURINARY:  [  Normal [ ] Incontinent   [ ]Oliguria/Anuria   [ ]Deng  MUSCULOSKELETAL:     ]Normal   [x ]Weakness  [ ]Bed/Wheelchair bound [ ]Edema back pain  NEUROLOGIC:   [ No focal deficits  [ ]Cognitive impairment  [ ]Dysphagia [ ]Dysarthria [ ]Paresis [ sx]Other paresthesias   SKIN:   [x ]Normal    [ ]Rash  [ ]Pressure ulcer(s)       Present on admission [ ]y [ ]n    CRITICAL CARE:  [ ] Shock Present  [ ]Septic [ ]Cardiogenic [ ]Neurologic [ ]Hypovolemic  [ ]  Vasopressors [ ]  Inotropes   [ ]Respiratory failure present [ ]Mechanical ventilation [ ]Non-invasive ventilatory support [ ]High flow  [ ]Acute  [ ]Chronic [ ]Hypoxic  [ ]Hypercarbic [ ]Other  [ ]Other organ failure     LABS:                        7.9    11.02 )-----------( 17       ( 15 May 2020 09:47 )             24.3   05-15    134<L>  |  101  |  19  ----------------------------<  112<H>  5.1   |  24  |  0.97    Ca    9.2      15 May 2020 09:47  Phos  4.0     05-15  Mg     2.1     05-15    TPro  6.3  /  Alb  3.1<L>  /  TBili  0.2  /  DBili  <0.1  /  AST  21  /  ALT  23  /  AlkPhos  65  05-15  PT/INR - ( 14 May 2020 10:29 )   PT: 10.4 sec;   INR: 0.91 ratio         PTT - ( 14 May 2020 10:29 )  PTT:25.4 sec      RADIOLOGY & ADDITIONAL STUDIES:    PROTEIN CALORIE MALNUTRITION PRESENT: [ ]mild [ ]moderate [ ]severe [ ]underweight [ ]morbid obesity  https://www.andeal.org/vault/2440/web/files/ONC/Table_Clinical%20Characteristics%20to%20Document%20Malnutrition-White%20JV%20et%20al%2020.pdf    Height (cm): 160.02 (20 @ 15:31)  Weight (kg): 104.3 (20 @ 15:31)  BMI (kg/m2): 40.7 (20 @ 15:31)    [ ]PPSV2 < or = to 30% [ ]significant weight loss  [ ]poor nutritional intake  [ ]anasarca     Albumin, Serum: 3.1 g/dL (05-15-20 @ 09:47)   [ ]Artificial Nutrition      REFERRALS:   [ ]Chaplaincy  [ ]Hospice  [ ]Child Life  [ ]Social Work  [ ]Case management [ ]Holistic Therapy     Goals of Care Document:

## 2020-05-22 LAB
ALBUMIN SERPL ELPH-MCNC: 2.9 G/DL — LOW (ref 3.3–5)
ALP SERPL-CCNC: 71 U/L — SIGNIFICANT CHANGE UP (ref 40–120)
ALT FLD-CCNC: 153 U/L — HIGH (ref 10–45)
ANION GAP SERPL CALC-SCNC: 12 MMOL/L — SIGNIFICANT CHANGE UP (ref 5–17)
APPEARANCE CSF: CLEAR — SIGNIFICANT CHANGE UP
APPEARANCE SPUN FLD: COLORLESS — SIGNIFICANT CHANGE UP
AST SERPL-CCNC: 65 U/L — HIGH (ref 10–40)
BASE EXCESS BLDV CALC-SCNC: -7.9 MMOL/L — LOW (ref -2–2)
BILIRUB DIRECT SERPL-MCNC: 0.7 MG/DL — HIGH (ref 0–0.2)
BILIRUB INDIRECT FLD-MCNC: 0.5 MG/DL — SIGNIFICANT CHANGE UP (ref 0.2–1)
BILIRUB SERPL-MCNC: 1.2 MG/DL — SIGNIFICANT CHANGE UP (ref 0.2–1.2)
BLD GP AB SCN SERPL QL: NEGATIVE — SIGNIFICANT CHANGE UP
BUN SERPL-MCNC: 30 MG/DL — HIGH (ref 7–23)
CA-I SERPL-SCNC: 1.28 MMOL/L — SIGNIFICANT CHANGE UP (ref 1.12–1.3)
CALCIUM SERPL-MCNC: 9.2 MG/DL — SIGNIFICANT CHANGE UP (ref 8.4–10.5)
CHLORIDE BLDV-SCNC: 109 MMOL/L — HIGH (ref 96–108)
CHLORIDE SERPL-SCNC: 106 MMOL/L — SIGNIFICANT CHANGE UP (ref 96–108)
CHROM ANALY INTERPHASE BLD FISH-IMP: SIGNIFICANT CHANGE UP
CMV DNA CSF QL NAA+PROBE: SIGNIFICANT CHANGE UP
CMV DNA SPEC NAA+PROBE-LOG#: SIGNIFICANT CHANGE UP LOG10IU/ML
CO2 BLDV-SCNC: 17 MMOL/L — LOW (ref 22–30)
CO2 SERPL-SCNC: 17 MMOL/L — LOW (ref 22–31)
COLOR CSF: SIGNIFICANT CHANGE UP
CREAT SERPL-MCNC: 0.86 MG/DL — SIGNIFICANT CHANGE UP (ref 0.5–1.3)
GAS PNL BLDV: 133 MMOL/L — LOW (ref 135–145)
GAS PNL BLDV: SIGNIFICANT CHANGE UP
GAS PNL BLDV: SIGNIFICANT CHANGE UP
GLUCOSE BLDC GLUCOMTR-MCNC: 164 MG/DL — HIGH (ref 70–99)
GLUCOSE BLDV-MCNC: 134 MG/DL — HIGH (ref 70–99)
GLUCOSE CSF-MCNC: 85 MG/DL — HIGH (ref 40–70)
GLUCOSE SERPL-MCNC: 194 MG/DL — HIGH (ref 70–99)
GRAM STN FLD: SIGNIFICANT CHANGE UP
HCO3 BLDV-SCNC: 16 MMOL/L — LOW (ref 21–29)
HCT VFR BLD CALC: 18.3 % — CRITICAL LOW (ref 34.5–45)
HCT VFR BLDA CALC: 22 % — CRITICAL LOW (ref 39–50)
HGB BLD CALC-MCNC: 7.1 G/DL — LOW (ref 11.5–15.5)
HGB BLD-MCNC: 6 G/DL — CRITICAL LOW (ref 11.5–15.5)
LACTATE BLDV-MCNC: 5.2 MMOL/L — CRITICAL HIGH (ref 0.7–2)
LACTATE SERPL-SCNC: 5.6 MMOL/L — CRITICAL HIGH (ref 0.7–2)
LDH SERPL L TO P-CCNC: 170 U/L — SIGNIFICANT CHANGE UP (ref 50–242)
LYMPHOCYTES # CSF: 70 % — SIGNIFICANT CHANGE UP (ref 40–80)
MAGNESIUM SERPL-MCNC: 1.9 MG/DL — SIGNIFICANT CHANGE UP (ref 1.6–2.6)
MCHC RBC-ENTMCNC: 30.8 PG — SIGNIFICANT CHANGE UP (ref 27–34)
MCHC RBC-ENTMCNC: 32.8 GM/DL — SIGNIFICANT CHANGE UP (ref 32–36)
MCV RBC AUTO: 93.8 FL — SIGNIFICANT CHANGE UP (ref 80–100)
MONOS+MACROS NFR CSF: 10 % — LOW (ref 15–45)
NEUTROPHILS # CSF: 0 % — SIGNIFICANT CHANGE UP (ref 0–6)
NRBC # BLD: 0 /100 WBCS — SIGNIFICANT CHANGE UP (ref 0–0)
NRBC NFR CSF: 4 /UL — SIGNIFICANT CHANGE UP (ref 0–5)
OTHER CELLS CSF MANUAL: 20 % — HIGH (ref 0–0)
OTHER CELLS CSF MANUAL: 8 ML/DL — LOW (ref 18–22)
PCO2 BLDV: 28 MMHG — LOW (ref 35–50)
PH BLDV: 7.37 — SIGNIFICANT CHANGE UP (ref 7.35–7.45)
PHOSPHATE SERPL-MCNC: 3.1 MG/DL — SIGNIFICANT CHANGE UP (ref 2.5–4.5)
PLATELET # BLD AUTO: 13 K/UL — CRITICAL LOW (ref 150–400)
PO2 BLDV: 45 MMHG — SIGNIFICANT CHANGE UP (ref 25–45)
POTASSIUM BLDV-SCNC: 4.8 MMOL/L — SIGNIFICANT CHANGE UP (ref 3.5–5.3)
POTASSIUM SERPL-MCNC: 4.5 MMOL/L — SIGNIFICANT CHANGE UP (ref 3.5–5.3)
POTASSIUM SERPL-SCNC: 4.5 MMOL/L — SIGNIFICANT CHANGE UP (ref 3.5–5.3)
PROT CSF-MCNC: 19 MG/DL — SIGNIFICANT CHANGE UP (ref 15–45)
PROT SERPL-MCNC: 5.5 G/DL — LOW (ref 6–8.3)
RBC # BLD: 1.95 M/UL — LOW (ref 3.8–5.2)
RBC # CSF: 1 /UL — HIGH (ref 0–0)
RBC # FLD: 16 % — HIGH (ref 10.3–14.5)
RH IG SCN BLD-IMP: POSITIVE — SIGNIFICANT CHANGE UP
SAO2 % BLDV: 78 % — SIGNIFICANT CHANGE UP (ref 67–88)
SODIUM SERPL-SCNC: 135 MMOL/L — SIGNIFICANT CHANGE UP (ref 135–145)
SPECIMEN SOURCE: SIGNIFICANT CHANGE UP
TUBE TYPE: SIGNIFICANT CHANGE UP
URATE SERPL-MCNC: 5.2 MG/DL — SIGNIFICANT CHANGE UP (ref 2.5–7)
WBC # BLD: <0.1 K/UL — CRITICAL LOW (ref 3.8–10.5)
WBC # FLD AUTO: <0.1 K/UL — CRITICAL LOW (ref 3.8–10.5)

## 2020-05-22 PROCEDURE — 99222 1ST HOSP IP/OBS MODERATE 55: CPT | Mod: GC

## 2020-05-22 PROCEDURE — 99291 CRITICAL CARE FIRST HOUR: CPT

## 2020-05-22 PROCEDURE — 99233 SBSQ HOSP IP/OBS HIGH 50: CPT

## 2020-05-22 PROCEDURE — 74018 RADEX ABDOMEN 1 VIEW: CPT | Mod: 26

## 2020-05-22 PROCEDURE — 71045 X-RAY EXAM CHEST 1 VIEW: CPT | Mod: 26

## 2020-05-22 PROCEDURE — 93010 ELECTROCARDIOGRAM REPORT: CPT

## 2020-05-22 PROCEDURE — 88108 CYTOPATH CONCENTRATE TECH: CPT | Mod: 26

## 2020-05-22 RX ORDER — ACETAMINOPHEN 500 MG
1000 TABLET ORAL ONCE
Refills: 0 | Status: DISCONTINUED | OUTPATIENT
Start: 2020-05-22 | End: 2020-05-22

## 2020-05-22 RX ORDER — HYDROMORPHONE HYDROCHLORIDE 2 MG/ML
0.4 INJECTION INTRAMUSCULAR; INTRAVENOUS; SUBCUTANEOUS ONCE
Refills: 0 | Status: DISCONTINUED | OUTPATIENT
Start: 2020-05-22 | End: 2020-05-22

## 2020-05-22 RX ORDER — HYDROMORPHONE HYDROCHLORIDE 2 MG/ML
0.3 INJECTION INTRAMUSCULAR; INTRAVENOUS; SUBCUTANEOUS
Qty: 100 | Refills: 0 | Status: DISCONTINUED | OUTPATIENT
Start: 2020-05-22 | End: 2020-05-22

## 2020-05-22 RX ORDER — HYDROMORPHONE HYDROCHLORIDE 2 MG/ML
0.6 INJECTION INTRAMUSCULAR; INTRAVENOUS; SUBCUTANEOUS ONCE
Refills: 0 | Status: DISCONTINUED | OUTPATIENT
Start: 2020-05-22 | End: 2020-05-22

## 2020-05-22 RX ORDER — CEFEPIME 1 G/1
INJECTION, POWDER, FOR SOLUTION INTRAMUSCULAR; INTRAVENOUS
Refills: 0 | Status: DISCONTINUED | OUTPATIENT
Start: 2020-05-22 | End: 2020-05-23

## 2020-05-22 RX ORDER — ACETAMINOPHEN 500 MG
1000 TABLET ORAL ONCE
Refills: 0 | Status: COMPLETED | OUTPATIENT
Start: 2020-05-22 | End: 2020-05-22

## 2020-05-22 RX ORDER — CASPOFUNGIN ACETATE 7 MG/ML
50 INJECTION, POWDER, LYOPHILIZED, FOR SOLUTION INTRAVENOUS EVERY 24 HOURS
Refills: 0 | Status: DISCONTINUED | OUTPATIENT
Start: 2020-05-23 | End: 2020-05-23

## 2020-05-22 RX ORDER — SODIUM CHLORIDE 9 MG/ML
250 INJECTION INTRAMUSCULAR; INTRAVENOUS; SUBCUTANEOUS ONCE
Refills: 0 | Status: COMPLETED | OUTPATIENT
Start: 2020-05-22 | End: 2020-05-22

## 2020-05-22 RX ORDER — HYDROMORPHONE HYDROCHLORIDE 2 MG/ML
0.5 INJECTION INTRAMUSCULAR; INTRAVENOUS; SUBCUTANEOUS ONCE
Refills: 0 | Status: DISCONTINUED | OUTPATIENT
Start: 2020-05-22 | End: 2020-05-22

## 2020-05-22 RX ORDER — VANCOMYCIN HCL 1 G
1000 VIAL (EA) INTRAVENOUS EVERY 12 HOURS
Refills: 0 | Status: DISCONTINUED | OUTPATIENT
Start: 2020-05-22 | End: 2020-05-23

## 2020-05-22 RX ORDER — CEFEPIME 1 G/1
2000 INJECTION, POWDER, FOR SOLUTION INTRAMUSCULAR; INTRAVENOUS EVERY 8 HOURS
Refills: 0 | Status: DISCONTINUED | OUTPATIENT
Start: 2020-05-22 | End: 2020-05-23

## 2020-05-22 RX ORDER — METHOTREXATE 2.5 MG/1
12 TABLET ORAL ONCE
Refills: 0 | Status: DISCONTINUED | OUTPATIENT
Start: 2020-05-22 | End: 2020-05-23

## 2020-05-22 RX ORDER — VANCOMYCIN HCL 1 G
1000 VIAL (EA) INTRAVENOUS ONCE
Refills: 0 | Status: COMPLETED | OUTPATIENT
Start: 2020-05-22 | End: 2020-05-22

## 2020-05-22 RX ORDER — SIMETHICONE 80 MG/1
80 TABLET, CHEWABLE ORAL EVERY 6 HOURS
Refills: 0 | Status: DISCONTINUED | OUTPATIENT
Start: 2020-05-22 | End: 2020-05-23

## 2020-05-22 RX ORDER — HYDROMORPHONE HYDROCHLORIDE 2 MG/ML
0.2 INJECTION INTRAMUSCULAR; INTRAVENOUS; SUBCUTANEOUS ONCE
Refills: 0 | Status: DISCONTINUED | OUTPATIENT
Start: 2020-05-22 | End: 2020-05-22

## 2020-05-22 RX ORDER — OXYCODONE HYDROCHLORIDE 5 MG/1
5 TABLET ORAL ONCE
Refills: 0 | Status: DISCONTINUED | OUTPATIENT
Start: 2020-05-22 | End: 2020-05-22

## 2020-05-22 RX ORDER — CASPOFUNGIN ACETATE 7 MG/ML
70 INJECTION, POWDER, LYOPHILIZED, FOR SOLUTION INTRAVENOUS ONCE
Refills: 0 | Status: COMPLETED | OUTPATIENT
Start: 2020-05-22 | End: 2020-05-22

## 2020-05-22 RX ORDER — MENTHOL AND METHYL SALICYLATE 10; 30 G/100G; G/100G
1 CREAM TOPICAL THREE TIMES A DAY
Refills: 0 | Status: DISCONTINUED | OUTPATIENT
Start: 2020-05-22 | End: 2020-05-23

## 2020-05-22 RX ORDER — CEFEPIME 1 G/1
2000 INJECTION, POWDER, FOR SOLUTION INTRAMUSCULAR; INTRAVENOUS ONCE
Refills: 0 | Status: COMPLETED | OUTPATIENT
Start: 2020-05-22 | End: 2020-05-22

## 2020-05-22 RX ORDER — CASPOFUNGIN ACETATE 7 MG/ML
INJECTION, POWDER, LYOPHILIZED, FOR SOLUTION INTRAVENOUS
Refills: 0 | Status: DISCONTINUED | OUTPATIENT
Start: 2020-05-22 | End: 2020-05-23

## 2020-05-22 RX ORDER — ACETAMINOPHEN 500 MG
650 TABLET ORAL ONCE
Refills: 0 | Status: COMPLETED | OUTPATIENT
Start: 2020-05-22 | End: 2020-05-22

## 2020-05-22 RX ADMIN — Medication 400 MILLIGRAM(S): at 12:30

## 2020-05-22 RX ADMIN — CEFEPIME 100 MILLIGRAM(S): 1 INJECTION, POWDER, FOR SOLUTION INTRAMUSCULAR; INTRAVENOUS at 12:30

## 2020-05-22 RX ADMIN — PANTOPRAZOLE SODIUM 40 MILLIGRAM(S): 20 TABLET, DELAYED RELEASE ORAL at 05:02

## 2020-05-22 RX ADMIN — SODIUM CHLORIDE 250 MILLILITER(S): 9 INJECTION INTRAMUSCULAR; INTRAVENOUS; SUBCUTANEOUS at 21:16

## 2020-05-22 RX ADMIN — HYDROMORPHONE HYDROCHLORIDE 0.3 MILLIGRAM(S): 2 INJECTION INTRAMUSCULAR; INTRAVENOUS; SUBCUTANEOUS at 04:30

## 2020-05-22 RX ADMIN — Medication 5 MILLIGRAM(S): at 00:08

## 2020-05-22 RX ADMIN — Medication 4 MILLIGRAM(S): at 05:02

## 2020-05-22 RX ADMIN — HYDROMORPHONE HYDROCHLORIDE 0.3 MILLIGRAM(S): 2 INJECTION INTRAMUSCULAR; INTRAVENOUS; SUBCUTANEOUS at 02:00

## 2020-05-22 RX ADMIN — Medication 5 MILLIGRAM(S): at 23:00

## 2020-05-22 RX ADMIN — OXYCODONE HYDROCHLORIDE 5 MILLIGRAM(S): 5 TABLET ORAL at 03:35

## 2020-05-22 RX ADMIN — Medication 300 MILLIGRAM(S): at 13:13

## 2020-05-22 RX ADMIN — Medication 4 MILLIGRAM(S): at 13:13

## 2020-05-22 RX ADMIN — HYDROMORPHONE HYDROCHLORIDE 0.2 MILLIGRAM(S): 2 INJECTION INTRAMUSCULAR; INTRAVENOUS; SUBCUTANEOUS at 20:32

## 2020-05-22 RX ADMIN — HYDROMORPHONE HYDROCHLORIDE 0.4 MILLIGRAM(S): 2 INJECTION INTRAMUSCULAR; INTRAVENOUS; SUBCUTANEOUS at 15:59

## 2020-05-22 RX ADMIN — LEVETIRACETAM 500 MILLIGRAM(S): 250 TABLET, FILM COATED ORAL at 17:54

## 2020-05-22 RX ADMIN — SODIUM CHLORIDE 500 MILLILITER(S): 9 INJECTION INTRAMUSCULAR; INTRAVENOUS; SUBCUTANEOUS at 20:33

## 2020-05-22 RX ADMIN — CHLORHEXIDINE GLUCONATE 1 APPLICATION(S): 213 SOLUTION TOPICAL at 09:48

## 2020-05-22 RX ADMIN — HYDROMORPHONE HYDROCHLORIDE 0.3 MG/HR: 2 INJECTION INTRAMUSCULAR; INTRAVENOUS; SUBCUTANEOUS at 09:59

## 2020-05-22 RX ADMIN — SIMETHICONE 80 MILLIGRAM(S): 80 TABLET, CHEWABLE ORAL at 18:49

## 2020-05-22 RX ADMIN — CEFEPIME 100 MILLIGRAM(S): 1 INJECTION, POWDER, FOR SOLUTION INTRAMUSCULAR; INTRAVENOUS at 22:58

## 2020-05-22 RX ADMIN — Medication 250 MILLIGRAM(S): at 13:12

## 2020-05-22 RX ADMIN — Medication 260 MILLIGRAM(S): at 23:40

## 2020-05-22 RX ADMIN — URSODIOL 300 MILLIGRAM(S): 250 TABLET, FILM COATED ORAL at 17:56

## 2020-05-22 RX ADMIN — SODIUM CHLORIDE 500 MILLILITER(S): 9 INJECTION INTRAMUSCULAR; INTRAVENOUS; SUBCUTANEOUS at 11:00

## 2020-05-22 RX ADMIN — URSODIOL 300 MILLIGRAM(S): 250 TABLET, FILM COATED ORAL at 05:02

## 2020-05-22 RX ADMIN — Medication 400 MILLIGRAM(S): at 17:50

## 2020-05-22 RX ADMIN — IMATINIB MESYLATE 400 MILLIGRAM(S): 400 TABLET, FILM COATED ORAL at 17:54

## 2020-05-22 RX ADMIN — LEVETIRACETAM 500 MILLIGRAM(S): 250 TABLET, FILM COATED ORAL at 05:02

## 2020-05-22 RX ADMIN — HYDROMORPHONE HYDROCHLORIDE 0.3 MILLIGRAM(S): 2 INJECTION INTRAMUSCULAR; INTRAVENOUS; SUBCUTANEOUS at 07:57

## 2020-05-22 RX ADMIN — IMATINIB MESYLATE 400 MILLIGRAM(S): 400 TABLET, FILM COATED ORAL at 05:02

## 2020-05-22 RX ADMIN — Medication 4 MILLIGRAM(S): at 00:08

## 2020-05-22 RX ADMIN — CASPOFUNGIN ACETATE 260 MILLIGRAM(S): 7 INJECTION, POWDER, LYOPHILIZED, FOR SOLUTION INTRAVENOUS at 13:13

## 2020-05-22 RX ADMIN — MENTHOL AND METHYL SALICYLATE 1 APPLICATION(S): 10; 30 CREAM TOPICAL at 23:01

## 2020-05-22 RX ADMIN — Medication 4 MILLIGRAM(S): at 17:54

## 2020-05-22 RX ADMIN — LIDOCAINE 1 PATCH: 4 CREAM TOPICAL at 22:58

## 2020-05-22 NOTE — PROGRESS NOTE ADULT - SUBJECTIVE AND OBJECTIVE BOX
HPC Transplant Team                                                      Critical / Counseling Time Provided: 30 minutes                                                                                                                                                        Chief Complaint:     S: Patient seen and examined with HPC Transplant Team:   Denies mouth / tongue / throat pain, dyspnea, cough, nausea, vomiting, diarrhea, abdominal pain     O: Vitals:   Vital Signs Last 24 Hrs  T(C): 37.5 (22 May 2020 05:10), Max: 37.5 (22 May 2020 05:10)  T(F): 99.5 (22 May 2020 05:10), Max: 99.5 (22 May 2020 05:10)  HR: 99 (22 May 2020 05:10) (81 - 113)  BP: 116/70 (22 May 2020 05:10) (116/70 - 139/87)  BP(mean): --  RR: 18 (22 May 2020 05:10) (18 - 20)  SpO2: 100% (22 May 2020 05:10) (99% - 100%)    Admit weight:   Daily     Daily     Intake / Output:   05-21 @ 07:01  -  05-22 @ 07:00  --------------------------------------------------------  IN: 2497.9 mL / OUT: 3000 mL / NET: -502.1 mL          PE:   Oropharynx:   Oral Mucositis:                                                        Grade:   CVS:   Lungs:   Abdomen:  Extremities:   Gastric Mucositis:                                                  Grade:   Intestinal Mucositis:                                              Grade:   Skin:   TLC:   Neuro:   Pain:     Labs:   CBC Full  -  ( 22 May 2020 06:58 )  WBC Count : <0.10 K/uL  Hemoglobin : 6.0 g/dL  Hematocrit : 18.3 %  Platelet Count - Automated : 13 K/uL  Mean Cell Volume : 93.8 fl  Mean Cell Hemoglobin : 30.8 pg  Mean Cell Hemoglobin Concentration : 32.8 gm/dL  Auto Neutrophil # : x  Auto Lymphocyte # : x  Auto Monocyte # : x  Auto Eosinophil # : x  Auto Basophil # : x  Auto Neutrophil % : x  Auto Lymphocyte % : x  Auto Monocyte % : x  Auto Eosinophil % : x  Auto Basophil % : x                          6.0    <0.10 )-----------( 13       ( 22 May 2020 06:58 )             18.3     05-22    135  |  106  |  30<H>  ----------------------------<  194<H>  4.5   |  17<L>  |  0.86    Ca    9.2      22 May 2020 06:58  Phos  3.1     05-22  Mg     1.9     05-22    TPro  5.5<L>  /  Alb  2.9<L>  /  TBili  1.2  /  DBili  0.7<H>  /  AST  65<H>  /  ALT  153<H>  /  AlkPhos  71  05-22    PT/INR - ( 21 May 2020 09:38 )   PT: 10.5 sec;   INR: 0.92 ratio         PTT - ( 21 May 2020 09:38 )  PTT:21.6 sec  LIVER FUNCTIONS - ( 22 May 2020 06:58 )  Alb: 2.9 g/dL / Pro: 5.5 g/dL / ALK PHOS: 71 U/L / ALT: 153 U/L / AST: 65 U/L / GGT: x           Lactate Dehydrogenase, Serum: 170 U/L (05-22 @ 06:58)  Lactate Dehydrogenase, Serum: 176 U/L (05-21 @ 09:38)          Karnofsky / Lansky Scale:   GVHD:   Skin:   Liver:   Gut:   Overall Grade:       Cultures:         Radiology:       Meds:   Antimicrobials:       Heme / Onc:   cytarabine PF IntraThecal (eMAR) 50 milliGRAM(s) IntraThecal once  imatinib 400 milliGRAM(s) Oral two times a day  methotrexate PF IntraThecal (eMAR) 12 milliGRAM(s) IntraThecal once      GI:  lactulose Syrup 10 Gram(s) Oral every 8 hours  pantoprazole    Tablet 40 milliGRAM(s) Oral before breakfast  polyethylene glycol 3350 17 Gram(s) Oral every 12 hours  senna 2 Tablet(s) Oral every 12 hours  ursodiol Capsule 300 milliGRAM(s) Oral every 12 hours      Cardiovascular:       Immunologic:       Other medications:   allopurinol 300 milliGRAM(s) Oral daily  chlorhexidine 4% Liquid 1 Application(s) Topical <User Schedule>  dexAMETHasone  Injectable 4 milliGRAM(s) IV Push every 6 hours  HYDROmorphone Infusion 0.2 mG/Hr IV Continuous <Continuous>  levETIRAcetam 500 milliGRAM(s) Oral two times a day  lidocaine   Patch 1 Patch Transdermal every 24 hours  melatonin 5 milliGRAM(s) Oral at bedtime  sodium chloride 0.9%. 500 milliLiter(s) IV Continuous <Continuous>      PRN:   HYDROmorphone  Injectable 0.3 milliGRAM(s) IV Push every 2 hours PRN  methyl salicylate 15%/menthol 10% Topical Cream 1 Application(s) Topical three times a day PRN  naloxone Injectable 0.1 milliGRAM(s) IV Push every 3 minutes PRN  ondansetron Injectable 8 milliGRAM(s) IV Push every 8 hours PRN  sodium chloride 0.9% lock flush 10 milliLiter(s) IV Push every 1 hour PRN      A/P:   ___ year old ___  with a history of ______________________  Pre / Status Post :  Autologous / Allogeneic PBSCT / BMT day ____________    1. Infectious Disease:   Fluconazole, Acyclovir     2. VOD Prophylaxis: Actigall, Glutamine, Heparin (dosed at 100 units / kg / day)     3. GI Prophylaxis:  Protonix    4. Mouthcare - NS / NaHCO3 rinses, Mycelex, Caphosol, skin care     5. GVHD prophylaxis     6. Transfuse & replete electrolytes prn     7. IV hydration, daily weights, strict I&O, prn diuresis     8. PO intake as tolerated, nutrition follow up as needed, MVI, folic acid     9. Antiemetics, anti-diarrhea medications:   Reglan, Ativan    10. OOB as tolerated, physical therapy consult if needed     11. Monitor coags / fibrinogen 2x week, vitamin K as needed     12. Monitor closely for clinical changes, monitor for fevers     13. Emotional support provided, plan of care discussed with patient and family, questions addressed     14. Patient education done regarding chemotherapy prep, plan of care, restrictions and discharge planning     15. Continue regular social work input     I have written the above note for Dr. Logan who performed service with me in the room.   Cedric Hernadez  NP-C ([phone])    I have seen and examined patient with NP, I agree with above note as scribed. HPC Transplant Team                                                      Critical / Counseling Time Provided: 30 minutes                                                                                                                                                        Chief Complaint:   persistent back pain, relapsed disease     S: Patient seen and examined with HPC Transplant Team:   + diffused pain in back, shoulders, hip    Denies any nausea, vomiting, diarrhea, chest pain or SOB.      O: Vitals:   Vital Signs Last 24 Hrs  T(C): 37.5 (22 May 2020 05:10), Max: 37.5 (22 May 2020 05:10)  T(F): 99.5 (22 May 2020 05:10), Max: 99.5 (22 May 2020 05:10)  HR: 99 (22 May 2020 05:10) (81 - 113)  BP: 116/70 (22 May 2020 05:10) (116/70 - 139/87)  BP(mean): --  RR: 18 (22 May 2020 05:10) (18 - 20)  SpO2: 100% (22 May 2020 05:10) (99% - 100%)    Intake / Output:   05-21 @ 07:01  -  05-22 @ 07:00  --------------------------------------------------------  IN: 2497.9 mL / OUT: 3000 mL / NET: -502.1 mL    PE:   General: appears in mild distress   CVS: S1, S2 RRR   Lungs: CTA throughout bilaterally   Abdomen: + BS x 4, soft, NT, ND   Extremities: no edema   Skin: no rash   Neuro: A&O x 3  Pain: chronic low back pain    Labs:   CBC Full  -  ( 22 May 2020 06:58 )  WBC Count : <0.10 K/uL  Hemoglobin : 6.0 g/dL  Hematocrit : 18.3 %  Platelet Count - Automated : 13 K/uL  Mean Cell Volume : 93.8 fl  Mean Cell Hemoglobin : 30.8 pg  Mean Cell Hemoglobin Concentration : 32.8 gm/dL  Auto Neutrophil # : x  Auto Lymphocyte # : x  Auto Monocyte # : x  Auto Eosinophil # : x  Auto Basophil # : x  Auto Neutrophil % : x  Auto Lymphocyte % : x  Auto Monocyte % : x  Auto Eosinophil % : x  Auto Basophil % : x                          6.0    <0.10 )-----------( 13       ( 22 May 2020 06:58 )             18.3     05-22    135  |  106  |  30<H>  ----------------------------<  194<H>  4.5   |  17<L>  |  0.86    Ca    9.2      22 May 2020 06:58  Phos  3.1     05-22  Mg     1.9     05-22    TPro  5.5<L>  /  Alb  2.9<L>  /  TBili  1.2  /  DBili  0.7<H>  /  AST  65<H>  /  ALT  153<H>  /  AlkPhos  71  05-22    PT/INR - ( 21 May 2020 09:38 )   PT: 10.5 sec;   INR: 0.92 ratio    PTT - ( 21 May 2020 09:38 )  PTT:21.6 sec  LIVER FUNCTIONS - ( 22 May 2020 06:58 )  Alb: 2.9 g/dL / Pro: 5.5 g/dL / ALK PHOS: 71 U/L / ALT: 153 U/L / AST: 65 U/L / GGT: x           Lactate Dehydrogenase, Serum: 170 U/L (05-22 @ 06:58)  Lactate Dehydrogenase, Serum: 176 U/L (05-21 @ 09:38)    Cultures:   Culture - CSF with Gram Stain . (05.15.20 @ 16:46)    Gram Stain:   polymorphonuclear leukocytes seen  No organisms seen  by cytocentrifuge    Specimen Source: .CSF CSF    Culture Results:   No growth at 3 days.    Radiology:   Xray Chest 1 View- PORTABLE-Urgent (05.22.20 @ 10:58) >    Clear lungs.    Meds:   Antimicrobials:   Cefepime 2 gm Q8 hrs  Caspofungin 70 mg IV x 1 dose today and 50 mg starting on 5/23.  Vancomycin 1 gm Q 12hrs.    Heme / Onc:   cytarabine PF IntraThecal (eMAR) 50 milliGRAM(s) IntraThecal once  imatinib 400 milliGRAM(s) Oral two times a day  methotrexate PF IntraThecal (eMAR) 12 milliGRAM(s) IntraThecal once    GI:  lactulose Syrup 10 Gram(s) Oral every 8 hours  pantoprazole    Tablet 40 milliGRAM(s) Oral before breakfast  polyethylene glycol 3350 17 Gram(s) Oral every 12 hours  senna 2 Tablet(s) Oral every 12 hours  ursodiol Capsule 300 milliGRAM(s) Oral every 12 hours    Other medications:   allopurinol 300 milliGRAM(s) Oral daily  chlorhexidine 4% Liquid 1 Application(s) Topical <User Schedule>  dexAMETHasone  Injectable 4 milliGRAM(s) IV Push every 6 hours  HYDROmorphone Infusion 0.2 mG/Hr IV Continuous <Continuous>  levETIRAcetam 500 milliGRAM(s) Oral two times a day  lidocaine   Patch 1 Patch Transdermal every 24 hours  melatonin 5 milliGRAM(s) Oral at bedtime  sodium chloride 0.9%. 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   S/p  PICC line placement  on 5/21.  5/22- Methotrexate I/T todasy, f/u flow cyto.    1. GI Prophylaxis:    pantoprazole    Tablet 40 milliGRAM(s) Oral before breakfast    2. Mouth care, skin care    3. Transfuse & replete electrolytes prn  Transfuse 2 units PRBC.  Transfuse one unit platelets today     4. IV hydration, daily weights, strict I&O, prn diuresis     5. PO intake as tolerated, nutrition follow up as needed, MVI, folic acid     6. Antiemetics, anti-diarrhea medications:     7. OOB as tolerated, physical therapy consult if needed     8. Monitor coags / fibrinogen 2x week, vitamin K as needed     9. Monitor closely for clinical changes, monitor for fevers     10. Emotional support provided, plan of care discussed with patient and family, questions addressed     11. Patient education done regarding  plan of care, restrictions and discharge planning     12. Continue regular social work input     I have written the above note for Dr. Herrera who performed service with me in the room.   Cedric JOHNS ([phone])    I have seen and examined patient with NP, I agree with above note as scribed.

## 2020-05-22 NOTE — PROGRESS NOTE ADULT - ATTENDING COMMENTS
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 Cont IV fluids  -monitor for tumor lysis syndrome. On Allopurinol,  -pt has Ommaya s/p IT Mtx given on 5/15 -showed 19% 'other' cells likely due to CSF involvement, flow cytometry showed 84% blasts. Plan 2x/wk IT chemo, on Tue/Friday's. Given Cytarabine 50mg IT on 5/19,  flow cytometry showed 40% c/w improvement. 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 COVID19 neg on 5/12, 5/16  -palliative care consult 60y F pmhx of Ph+ ALL, hx of SDH, s/p haploSCT 2/13/19, with chronic arthritic back pain p/w acute on chronic low back pain and profound b/l LE weakness, found with leukocytosis and cytopenia, BM bx 5/15 c/w relapse ALL, admitted for eval and treatment  CNS also + for leukemia    -febrile today -will start Cefepime, Caspofungin (pt neutropenic), f/u Cx's  -cont high dose Gleevec 400mg twice daily. Cont IV fluids  -cont Allopurinol for tumor lysis, wbc now low  -pt has Ommaya s/p IT Mtx given on 5/15 -showed 19% 'other' cells likely due to CSF involvement, flow cytometry showed 84% blasts. Plan 2x/wk IT chemo, on Tue/Friday's. Given Cytarabine 50mg IT on 5/19,  flow cytometry showed 40% c/w improvement. 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

## 2020-05-22 NOTE — CONSULT NOTE ADULT - SUBJECTIVE AND OBJECTIVE BOX
CHIEF COMPLAINT:    HPI:    PAST MEDICAL & SURGICAL HISTORY:  Herpes zoster  Leukemia  Clostridium difficile diarrhea  Intractable migraine with status migrainosus, unspecified migraine type  Sciatica of right side  Chronic gastritis, presence of bleeding unspecified, unspecified gastritis type  Essential hypertension  Lipoma: left side  Elective surgical procedure: ommaya placement  History of  delivery      FAMILY HISTORY:  No pertinent family history in first degree relatives      SOCIAL HISTORY:  Smoking: [ ] Never Smoked [ ] Former Smoker (__ packs x ___ years) [ ] Current Smoker  (__ packs x ___ years)  Substance Use: [ ] Never Used [ ] Used ____  EtOH Use:  Marital Status: [ ] Single [ ]  [ ]  [ ]   Sexual History:   Occupation:  Recent Travel:  Country of Birth:  Advance Directives:    Allergies    No Known Drug Allergies  shellfish (Nausea; Vomiting)    Intolerances        HOME MEDICATIONS:    REVIEW OF SYSTEMS:  Constitutional: [ ] negative [ ] fevers [ ] chills [ ] weight loss [ ] weight gain  HEENT: [ ] negative [ ] dry eyes [ ] eye irritation [ ] postnasal drip [ ] nasal congestion  CV: [ ] negative  [ ] chest pain [ ] orthopnea [ ] palpitations [ ] murmur  Resp: [ ] negative [ ] cough [ ] shortness of breath [ ] dyspnea [ ] wheezing [ ] sputum [ ] hemoptysis  GI: [ ] negative [ ] nausea [ ] vomiting [ ] diarrhea [ ] constipation [ ] abd pain [ ] dysphagia   : [ ] negative [ ] dysuria [ ] nocturia [ ] hematuria [ ] increased urinary frequency  Musculoskeletal: [ ] negative [ ] back pain [ ] myalgias [ ] arthralgias [ ] fracture  Skin: [ ] negative [ ] rash [ ] itch  Neurological: [ ] negative [ ] headache [ ] dizziness [ ] syncope [ ] weakness [ ] numbness  Psychiatric: [ ] negative [ ] anxiety [ ] depression  Endocrine: [ ] negative [ ] diabetes [ ] thyroid problem  Hematologic/Lymphatic: [ ] negative [ ] anemia [ ] bleeding problem  Allergic/Immunologic: [ ] negative [ ] itchy eyes [ ] nasal discharge [ ] hives [ ] angioedema  [ ] All other systems negative  [ ] Unable to assess ROS because ________    OBJECTIVE:  ICU Vital Signs Last 24 Hrs  T(C): 38.4 (22 May 2020 10:45), Max: 38.4 (22 May 2020 10:45)  T(F): 101.1 (22 May 2020 10:45), Max: 101.1 (22 May 2020 10:45)  HR: 132 (22 May 2020 10:45) (99 - 132)  BP: 93/64 (22 May 2020 10:45) (87/54 - 134/82)  BP(mean): --  ABP: --  ABP(mean): --  RR: 20 (22 May 2020 10:45) (18 - 20)  SpO2: 100% (22 May 2020 10:45) (100% - 100%)        - @ 07:01  -   @ 07:00  --------------------------------------------------------  IN: 2497.9 mL / OUT: 3000 mL / NET: -502.1 mL      CAPILLARY BLOOD GLUCOSE      POCT Blood Glucose.: 164 mg/dL (22 May 2020 12:12)      PHYSICAL EXAM:  General:   HEENT:   Lymph Nodes:  Neck:   Respiratory:   Cardiovascular:   Abdomen:   Extremities:   Skin:   Neurological:  Psychiatry:    LINES:     HOSPITAL MEDICATIONS:    caspofungin IVPB      cefepime   IVPB 2000 milliGRAM(s) IV Intermittent every 8 hours  cefepime   IVPB      vancomycin  IVPB 1000 milliGRAM(s) IV Intermittent every 12 hours      allopurinol 300 milliGRAM(s) Oral daily  dexAMETHasone  Injectable 4 milliGRAM(s) IV Push every 6 hours      levETIRAcetam 500 milliGRAM(s) Oral two times a day  melatonin 5 milliGRAM(s) Oral at bedtime  ondansetron Injectable 8 milliGRAM(s) IV Push every 8 hours PRN    lactulose Syrup 10 Gram(s) Oral every 8 hours  pantoprazole    Tablet 40 milliGRAM(s) Oral before breakfast  polyethylene glycol 3350 17 Gram(s) Oral every 12 hours  senna 2 Tablet(s) Oral every 12 hours  ursodiol Capsule 300 milliGRAM(s) Oral every 12 hours    cytarabine PF IntraThecal (eMAR) 50 milliGRAM(s) IntraThecal once  imatinib 400 milliGRAM(s) Oral two times a day  methotrexate PF IntraThecal (eMAR) 12 milliGRAM(s) IntraThecal once  methotrexate PF IntraThecal (eMAR) 12 milliGRAM(s) IntraThecal once      sodium chloride 0.9% lock flush 10 milliLiter(s) IV Push every 1 hour PRN  sodium chloride 0.9%. 500 milliLiter(s) IV Continuous <Continuous>      chlorhexidine 4% Liquid 1 Application(s) Topical <User Schedule>  lidocaine   Patch 1 Patch Transdermal every 24 hours  methyl salicylate 15%/menthol 10% Topical Cream 1 Application(s) Topical three times a day PRN    naloxone Injectable 0.1 milliGRAM(s) IV Push every 3 minutes PRN      LABS:                        6.0    <0.10 )-----------( 13       ( 22 May 2020 06:58 )             18.3     Hgb Trend: 6.0<--, 7.2<--, 6.8<--, 7.9<--, 7.0<--      135  |  106  |  30<H>  ----------------------------<  194<H>  4.5   |  17<L>  |  0.86    Ca    9.2      22 May 2020 06:58  Phos  3.1       Mg     1.9         TPro  5.5<L>  /  Alb  2.9<L>  /  TBili  1.2  /  DBili  0.7<H>  /  AST  65<H>  /  ALT  153<H>  /  AlkPhos  71      Creatinine Trend: 0.86<--, 0.85<--, 0.84<--, 0.80<--, 0.96<--, 0.95<--  PT/INR - ( 21 May 2020 09:38 )   PT: 10.5 sec;   INR: 0.92 ratio         PTT - ( 21 May 2020 09:38 )  PTT:21.6 sec      Venous Blood Gas:   @ 12:37  7.37/28/45/16/78  VBG Lactate: 5.2      MICROBIOLOGY:     RADIOLOGY:  [ ] Reviewed and interpreted by me    EKG: CHIEF COMPLAINT: sent from [facility]    HPI:  60F hx ALL s/p chemo, allogenic SCT (2016) in remission, SDH, chronic back pain who presented from [facility] for concern of pt's weakness. Found to have lower back pain and UE and LE tingling. Workup with MRI showed enhancing T2 prolongation and cauda equina, suspicious for relapse of disease. Pt had bone marrow biopsy on 5/15, which confirmed as well as an LP, which showed CSF consistent with B-lymphoblastic leukemia. Pt given imatinib. Now pancytopenic with febrile neutropenia. PICC placed on  and has already been treated for E. coli UTI. Lactate elevated to 5. Also recently started on opioid gtt for persistent pain.     MICU consulted for hypotension. Hypotension started today and initially responded to 1U PRBC and fluids. Now continues to be hypotensive to 63/41 with HR in 130s. Saturating well on RA.     PAST MEDICAL & SURGICAL HISTORY:  Herpes zoster  Leukemia  Clostridium difficile diarrhea  Intractable migraine with status migrainosus, unspecified migraine type  Sciatica of right side  Chronic gastritis, presence of bleeding unspecified, unspecified gastritis type  Essential hypertension  Lipoma: left side  Elective surgical procedure: ommaya placement  History of  delivery    FAMILY HISTORY:  No pertinent family history in first degree relatives      SOCIAL HISTORY:  Smoking: [ ] Never Smoked [x] Former Smoker (__ packs x ___ years) [ ] Current Smoker  (__ packs x ___ years)  Substance Use: [x] Never Used [ ] Used ____  EtOH Use: none    Allergies  No Known Drug Allergies  shellfish (Nausea; Vomiting)    HOME MEDICATIONS:  · 	vancomycin 125 mg oral capsule: Last Dose Taken:  , 1 cap(s) orally every 6 hours  · 	predniSONE 20 mg oral tablet: Last Dose Taken:  , 2 tab(s) orally once a day  · 	predniSONE 10 mg oral tablet: Last Dose Taken:  , 6 tab(s) orally once a day  · 	tacrolimus 0.5 mg oral capsule: Last Dose Taken:  , 2 cap(s) orally 2 times a day   · 	tacrolimus 1 mg oral capsule: Last Dose Taken:  , 1 cap(s) orally every 12 hours   · 	valGANciclovir 450 mg oral tablet: Last Dose Taken:  , 1 tab(s) orally 2 times a day  · 	amitriptyline 25 mg oral tablet: Last Dose Taken:  , 1 tab(s) orally once a day (at bedtime)  · 	HYDROmorphone 4 mg oral tablet: Last Dose Taken:  , 1 tab(s) orally every 4 hours, As needed, Severe Pain (7 - 10) MDD:24 mg  · 	amLODIPine 5 mg oral tablet: Last Dose Taken:  , 1 tab(s) orally once a day  · 	folic acid 1 mg oral tablet: Last Dose Taken:  , 1 tab(s) orally once a day  · 	Multiple Vitamins oral tablet: Last Dose Taken:  , 1 tab(s) orally once a day  · 	pantoprazole 40 mg oral delayed release tablet: Last Dose Taken:  , 1 tab(s) orally once a day (before a meal)  · 	atovaquone 750 mg/5 mL oral suspension: Last Dose Taken:  , 5 milliliter(s) orally 2 times a day  · 	Actigall 300 mg oral capsule: Last Dose Taken:  , 1 cap(s) orally 2 times a day  · 	fluconazole 200 mg oral tablet: Last Dose Taken:  , 2 tab(s) orally once a day  · 	ondansetron 8 mg oral tablet: Last Dose Taken:  , 1 tab(s) orally 3 times a day, As Needed  · 	metoclopramide 10 mg oral tablet: Last Dose Taken:  , 1 tab(s) orally 4 times a day (before meals and at bedtime), As Needed  · 	levETIRAcetam 500 mg oral tablet: Last Dose Taken:  , 1 tab(s) orally 2 times a day  · 	Cozaar 100 mg oral tablet: Last Dose Taken:  , 1 tab(s) orally once a day    REVIEW OF SYSTEMS:  Constitutional: [ ] negative [x] fevers [ ] chills [ ] weight loss [ ] weight gain   HEENT: [x] negative [ ] dry eyes [ ] eye irritation [ ] postnasal drip [ ] nasal congestion  CV: [x] negative  [ ] chest pain [ ] orthopnea [ ] palpitations [ ] murmur  Resp: [x] negative [ ] cough [ ] shortness of breath [ ] dyspnea [ ] wheezing [ ] sputum [ ] hemoptysis  GI: [ ] negative [ ] nausea [ ] vomiting [ ] diarrhea [ ] constipation [x] abd pain [ ] dysphagia   : [x] negative [ ] dysuria [ ] nocturia [ ] hematuria [ ] increased urinary frequency  Musculoskeletal: [ ] negative [ ] back pain [ ] myalgias [ ] arthralgias [ ] fracture [x]shoulder pain  Skin: [x] negative [ ] rash [ ] itch  Neurological: [x] negative [ ] headache [ ] dizziness [ ] syncope [ ] weakness [ ] numbness  Psychiatric: [x] negative [ ] anxiety [ ] depression  Endocrine: [x] negative [ ] diabetes [ ] thyroid problem  Hematologic/Lymphatic: [x] negative [ ] anemia [ ] bleeding problem  Allergic/Immunologic: [x] negative [ ] itchy eyes [ ] nasal discharge [ ] hives [ ] angioedema    OBJECTIVE:  ICU Vital Signs Last 24 Hrs  T(C): 38.4 (22 May 2020 10:45), Max: 38.4 (22 May 2020 10:45)  T(F): 101.1 (22 May 2020 10:45), Max: 101.1 (22 May 2020 10:45)  HR: 132 (22 May 2020 10:45) (99 - 132)  BP: 93/64 (22 May 2020 10:45) (87/54 - 134/82)  BP(mean): --  ABP: --  ABP(mean): --  RR: 20 (22 May 2020 10:45) (18 - 20)  SpO2: 100% (22 May 2020 10:45) (100% - 100%)      05-21 @ 07:01  -  - @ 07:00  --------------------------------------------------------  IN: 2497.9 mL / OUT: 3000 mL / NET: -502.1 mL      CAPILLARY BLOOD GLUCOSE  POCT Blood Glucose.: 164 mg/dL (22 May 2020 12:12)      PHYSICAL EXAM:  General: NAD  HEENT: NCAT  Respiratory: decreased breath sounds at bases  Cardiovascular: S1/S2 normal, tachycardic, normal rhythm; no murmurs/rubs/gallops appreciated  Abdomen: soft, tender to palpation; mildly distended  Extremities: no b/l LE edema  Skin: dry  Neurological: AAOx3, no focal deficits    LINES:   [facility] MEDICATIONS:  caspofungin IVPB      cefepime   IVPB 2000 milliGRAM(s) IV Intermittent every 8 hours  cefepime   IVPB      vancomycin  IVPB 1000 milliGRAM(s) IV Intermittent every 12 hours      allopurinol 300 milliGRAM(s) Oral daily  dexAMETHasone  Injectable 4 milliGRAM(s) IV Push every 6 hours      levETIRAcetam 500 milliGRAM(s) Oral two times a day  melatonin 5 milliGRAM(s) Oral at bedtime  ondansetron Injectable 8 milliGRAM(s) IV Push every 8 hours PRN    lactulose Syrup 10 Gram(s) Oral every 8 hours  pantoprazole    Tablet 40 milliGRAM(s) Oral before breakfast  polyethylene glycol 3350 17 Gram(s) Oral every 12 hours  senna 2 Tablet(s) Oral every 12 hours  ursodiol Capsule 300 milliGRAM(s) Oral every 12 hours    cytarabine PF IntraThecal (eMAR) 50 milliGRAM(s) IntraThecal once  imatinib 400 milliGRAM(s) Oral two times a day  methotrexate PF IntraThecal (eMAR) 12 milliGRAM(s) IntraThecal once  methotrexate PF IntraThecal (eMAR) 12 milliGRAM(s) IntraThecal once      sodium chloride 0.9% lock flush 10 milliLiter(s) IV Push every 1 hour PRN  sodium chloride 0.9%. 500 milliLiter(s) IV Continuous <Continuous>      chlorhexidine 4% Liquid 1 Application(s) Topical <User Schedule>  lidocaine   Patch 1 Patch Transdermal every 24 hours  methyl salicylate 15%/menthol 10% Topical Cream 1 Application(s) Topical three times a day PRN    naloxone Injectable 0.1 milliGRAM(s) IV Push every 3 minutes PRN      LABS:                        6.0    <0.10 )-----------( 13       ( 22 May 2020 06:58 )             18.3     Hgb Trend: 6.0<--, 7.2<--, 6.8<--, 7.9<--, 7.0<--      135  |  106  |  30<H>  ----------------------------<  194<H>  4.5   |  17<L>  |  0.86    Ca    9.2      22 May 2020 06:58  Phos  3.1       Mg     1.9         TPro  5.5<L>  /  Alb  2.9<L>  /  TBili  1.2  /  DBili  0.7<H>  /  AST  65<H>  /  ALT  153<H>  /  AlkPhos  71      Creatinine Trend: 0.86<--, 0.85<--, 0.84<--, 0.80<--, 0.96<--, 0.95<--  PT/INR - ( 21 May 2020 09:38 )   PT: 10.5 sec;   INR: 0.92 ratio         PTT - ( 21 May 2020 09:38 )  PTT:21.6 sec      Venous Blood Gas:   @ 12:37  7.37/28/45/16/78  VBG Lactate: 5.2    MICROBIOLOGY:   COVID-19 PCR . (20 @ 15:54)    COVID-19 PCR: NotDetec: This test has been validated by [facility] to be accurate;  though it has not been FDA cleared/approved by the usual pathway.  As with all laboratory tests, results should be correlated with clinical  findings.  https://www.fda.gov/media/090598/download  https://www.fda.gov/media/309345/download    Culture - CSF with Gram Stain . (05.15.20 @ 16:46)    Gram Stain:   polymorphonuclear leukocytes seen  No organisms seen  by cytocentrifuge    Specimen Source: .CSF CSF    Culture Results:   No growth at 3 days.    Culture - Urine (20 @ 01:06)    -  Ampicillin/Sulbactam: S <=8/4 Enterobacter, Citrobacter, and Serratia may develop resistance during prolonged therapy (3-4 days)    -  Ceftriaxone: S <=1 Enterobacter, Citrobacter, and Serratia may develop resistance during prolonged therapy    -  Aztreonam: S <=4    -  Ciprofloxacin: S <=1    -  Imipenem: S <=1    -  Levofloxacin: S <=2    -  Meropenem: S <=1    -  Nitrofurantoin: S <=32 Should not be used to treat pyelonephritis    -  Amikacin: S <=16    -  Cefazolin: S <=8 (MIC_CL_[id number]) For uncomplicated UTI with K. pneumoniae, E. coli, or P. mirablis: DL <=16 is sensitive and DL >=32 is resistant. This also predicts results for oral agents cefaclor, cefdinir, cefpodoxime, cefprozil, cefuroxime axetil, cephalexin and locarbef for uncomplicated UTI. Note that some isolates may be susceptible to these agents while testing resistant to cefazolin.    -  Cefepime: S <=4    -  Cefoxitin: S <=8    -  Gentamicin: S <=4    -  Ampicillin: S <=8 These ampicillin results predict results for amoxicillin    -  Trimethoprim/Sulfamethoxazole: S <=2/38    -  Piperacillin/Tazobactam: S <=16    -  Tigecycline: S <=2    -  Tobramycin: S <=4    Specimen Source: .Urine Clean Catch (Midstream)    Culture Results:   >100,000 CFU/ml Escherichia coli    Organism Identification: Escherichia coli    Organism: Escherichia coli    Method Type: DL      RADIOLOGY:  < from: MR Lumbar Spine w/wo IV Cont (20 @ 22:50) >  The vertebral body height and alignment appear normal.    There is decreased signal seen involving the visualized vertebral bodies. This could be secondary to underlying marrow infiltrative process though the possibility of underlying severe anemia must be considered as well. Please correlate clinically.    Disc desiccation is seen involving the L2-3 level which is secondary to degenerative changes.    Enhancing area of abnormal T2 prolongation is seen involving the visualized portion of the lower spinal cord down to the conus. Abnormal enhancement involving the cauda equina is seen as well. This could be compatible patient's known leukemia though underlying infectious or inflammatory. Clinical correlation is recommended. Dedicated imaging thoracic spine is recommended for further evaluation.     T10-11: Normal    T11-12: Normal    T12-L1: Normal    L1-2: Normal    L2-3: Bilateral hypertrophic facet joint changes are seen. Mild narrowing of the spinal canal.    L3-4: Bilateral hypertrophic facet joint changes are seen. No significant compromise of the spinal canal or either neural foramen    L4-5: Disc bulge and bilateral hypertrophic facet joint changes are seen. No significant compromise of the spinal canal or either neural foramen.    L5-S1: Disc bulge is seen. No significant compromise of the spinal canal or either neural foramen.    The conus ends at L1.    Transitional vertebrae is seen at the L5 level.    IMPRESSION: Enhancing T2 prolongation involving the conus ends and enhancement involving the cauda equina as well.    Degenerative changes as described     < end of copied text >

## 2020-05-22 NOTE — CONSULT NOTE ADULT - ASSESSMENT
----------------------------------------  She Ramirez MD PGY-6  Pulmonary/Critical Care Fellow  Pager # [phone] (NS), [phone] ([facility]) 60F hx ALL s/p chemo, allogenic SCT (2016) in remission who presented from [facility] for concern of pt's weakness. Found to have relapse of her ALL in bone marrow and CSF, started on imatinib. Now pancytopenic with febrile neutropenia, elevated lactate and hypotensive. Shock state likely secondary to underlying sepsis and hypovolemia. Initially responded well to 1U PRBC and fluids. Pt also recently started on dilaudid gtt.    - can try midodrine 10mg q8h   - would stop opioid gtt for now and convert to IVP prn  - cont volume resuscitation, likely has insensible losses from fever  - pt now septic, would treat with broad spectrum antibiotics (such as vanc/zosyn) and panculture  - will need better IV access, pt at this time only has a single lumen PICC  - pt not candidate for MICU at this time    ----------------------------------------  She Ramirez MD PGY-6  Pulmonary/Critical Care Fellow  Pager # [phone] ([facility]), [phone] ([facility])

## 2020-05-22 NOTE — PROGRESS NOTE ADULT - PROBLEM SELECTOR PLAN 6
Actions:  [ ] Rapport building     [x] Symptom assessment    [] Eliciting preferences of goals   [] Prognostic understanding    [x] Emotional Support  [] Coping skill development  []  Other  Interdisciplinary Referrals: [facility]  Communication:  d/w nursing team  Documentation Review: [x] Primary Team [] Consultants [] Interdisciplinary team

## 2020-05-22 NOTE — CONSULT NOTE ADULT - ATTENDING COMMENTS
Patient seen and examined in BMAR unit this AM during RRT. Patient with extensive history with chronic pain on dilaudid gtt who had new hypotension in the setting of anemia, fevers, and pain medications. Patient was mentating well and talking throughout our interview. Patient to receive IVF, PRBC, and broad spectrum antibiotics for sepsis. Would suggest discontinuing opiod drip as this may worsen hypotension.      Full Note to follow    At this time patient does not require ICU level care.   Please reconsult as needed Patient seen and examined in BMAR unit this AM during RRT. Patient with extensive history including leukemia s/p bone marrow transplant and chronic pain on dilaudid gtt who had new hypotension in the setting of anemia, fevers, and pain medications. Patient was mentating well and talking throughout our interview. Patient to receive IVF, PRBC, and broad spectrum antibiotics for sepsis. Would suggest discontinuing dilaudid drip as this may worsen hypotension.    At this time patient does not require ICU level care.   Please reconsult as needed

## 2020-05-22 NOTE — CHART NOTE - NSCHARTNOTEFT_GEN_A_CORE
Called by RN to evaluate Pt. for temp =   (f)     .  Pt seen and evaluated at bedside.  Denies headache, dizziness, visual disturbance, chest pain, cough, SOB, palpitations, abdominal pain, N/V/D , dysuria.   Offers no complaints at present time.      Vital Signs Last 24 Hrs  T(C): 36.6 (22 May 2020 19:48), Max: 38.4 (22 May 2020 10:45)  T(F): 97.8 (22 May 2020 19:48), Max: 101.1 (22 May 2020 10:45)  HR: 113 (22 May 2020 20:18) (99 - 132)  BP: 95/63 (22 May 2020 20:18) (83/55 - 134/83)  BP(mean): --  RR: 18 (22 May 2020 20:18) (18 - 20)  SpO2: 95% (22 May 2020 19:48) (95% - 100%)    Labs:                        6.0    <0.10 )-----------( 13       ( 22 May 2020 06:58 )             18.3       05-22    135  |  106  |  30<H>  ----------------------------<  194<H>  4.5   |  17<L>  |  0.86    Ca    9.2      22 May 2020 06:58  Phos  3.1     05-22  Mg     1.9     05-22    TPro  5.5<L>  /  Alb  2.9<L>  /  TBili  1.2  /  DBili  0.7<H>  /  AST  65<H>  /  ALT  153<H>  /  AlkPhos  71  05-22      Antimicrobials:  MEDICATIONS  (STANDING):  allopurinol 300 milliGRAM(s) Oral daily  caspofungin IVPB      cefepime   IVPB 2000 milliGRAM(s) IV Intermittent every 8 hours  cefepime   IVPB      chlorhexidine 4% Liquid 1 Application(s) Topical <User Schedule>  cytarabine PF IntraThecal (eMAR) 50 milliGRAM(s) IntraThecal once  dexAMETHasone  Injectable 4 milliGRAM(s) IV Push every 6 hours  imatinib 400 milliGRAM(s) Oral two times a day  lactulose Syrup 10 Gram(s) Oral every 8 hours  levETIRAcetam 500 milliGRAM(s) Oral two times a day  lidocaine   Patch 1 Patch Transdermal every 24 hours  melatonin 5 milliGRAM(s) Oral at bedtime  methotrexate PF IntraThecal (eMAR) 12 milliGRAM(s) IntraThecal once  methotrexate PF IntraThecal (eMAR) 12 milliGRAM(s) IntraThecal once  pantoprazole    Tablet 40 milliGRAM(s) Oral before breakfast  polyethylene glycol 3350 17 Gram(s) Oral every 12 hours  senna 2 Tablet(s) Oral every 12 hours  simethicone 80 milliGRAM(s) Chew every 6 hours  sodium chloride 0.9%. 500 milliLiter(s) (75 mL/Hr) IV Continuous <Continuous>  ursodiol Capsule 300 milliGRAM(s) Oral every 12 hours  vancomycin  IVPB 1000 milliGRAM(s) IV Intermittent every 12 hours        PE:    General: Alert & Oriented x 3, non toxic, in no acute distress  Neuro: non focal  Cardiac: S1, S2, tachycardic  Pulm: Lungs CTA  GI: Abd soft, NT/ND, + bowel sounds x 4 quadrants  Ext: no edema, + pedal pulses BL  Skin: intact      AP:     1.  Neutropenic fever       - continue with current antimicrobials       - continue antipyretic/cooling measures      - continue IV hydration      - BC x 2      - UA/CS      - f/u panculture results      - cxr      - continue to closely monitor      - f/u with primary team in AM    MUKESH Coto x Called by RN to evaluate Pt. for c/o pain.  Pt. seen and evaluated at bedside. Pt. A&O x 3., ill appearing.  Pt states "I have pain all over".  Of note, RRT called during day shift for hypotension; dilaudid drip had been stopped secondary to hypotension.  Pt. BP currently 95/63.  Normal saline bolus 250cc x 2  ordered; dilaudid 0.2 mg IVP ordered.  Continue to  monitor closely; f/u with primary team in am.    Balbina Erickson, ANP ext [phone]. Called by RN to evaluate Pt. for c/o pain.  Pt. seen and evaluated at bedside. Pt. A&O x 3., ill appearing.  Pt states "I have pain all over".  Of note, RRT called during day shift for hypotension; dilaudid drip had been stopped secondary to hypotension.  Pt. BP currently 95/63.  Normal saline bolus 250cc x 2  ordered; repeat [id number] 0.2 mg IVP ordered.  Continue to  monitor closely; f/u with primary team in am.    Balbina Erickson, ANP ext [phone]. Called by RN to evaluate Pt. for c/o pain.  Pt. seen and evaluated at bedside. Pt. A&O x 3., ill appearing.  Pt states "I have pain all over".  Of note, RRT called during day shift for hypotension; dilaudid drip had been stopped secondary to hypotension.  Pt. BP currently 95/63.  Normal saline bolus 250cc x 2  ordered; repeat YS=601/69. dilaudid 0.2 mg IVP ordered.  Continue to  monitor closely; f/u with primary team in am.    Balbina Erickson, ANP ext [phone].

## 2020-05-22 NOTE — PROGRESS NOTE ADULT - ATTENDING COMMENTS
In the event of newly developing, evolving, or worsening symptoms, please contact the Palliative Medicine team via pager (if the patient is at [facility] #[phone] or if the patient is at [facility] #[phone]) The Geriatric and Palliative Medicine service has coverage 24 hours a day/ 7 days a week to provide medical recommendations regarding symptom management needs via telephone

## 2020-05-22 NOTE — CHART NOTE - NSCHARTNOTEFT_GEN_A_CORE
RRT Note: RRT Note:  RRT was called for hypotension , BP- 63/41, HR-130, patients BP this am at 0938 was 87/54, temp- 38.2, was started on first unit of PRBC and  ml bolus was given, Cefepime 2gm Q12 hrs, caspofungin 70 mg was given, blood cultures sent, patient on Dilaudid 0.3/hrcontinuos drip,  at 1150 patient did not respond to fluids or PRBC, RRT was called, continued fluid resuscitation with NS, Vanco 1 gm stat 1 dose given, discontinued the Dilaudid drip, Tylenol 1 gm IV x1 dose given, Midodrine 20 mg PO x1 dose given, CXR was performed which shows clear lungs and abdominal xray showed non specific bowel gas pattern. Lactate level is 5.6 Dr. Chavarria aware. Will continue to monitor closely. RRT Note:  RRT was called for hypotension , BP- 63/41, HR-130, patients BP this am at 0938 was 87/54, temp- 38.2, was started on first unit of PRBC and  ml bolus was given, Cefepime 2gm Q12 hrs, caspofungin 70 mg was given, blood cultures sent, patient on Dilaudid 0.3/hrcontinuos drip,  at 1150 patient did not respond to fluids or PRBC, RRT was called, continued fluid resuscitation with NS, Vanco 1 gm stat 1 dose given, discontinued the Dilaudid drip, Tylenol 1 gm IV x1 dose given, Midodrine 20 mg PO x1 dose given, CXR was performed which shows clear lungs and abdominal xray showed non specific bowel gas pattern. Lactate level is 5.6 Dr. Chavarria aware.  MICU consulted, will continue to monitor closely.

## 2020-05-22 NOTE — PROGRESS NOTE ADULT - SUBJECTIVE AND OBJECTIVE BOX
Overnight events: pain gtt decreased from 0.2 mg/hr to 0.3mg/hr, used 5 prns before 8 am.   Staff reports: Uncontrolled pain post PRN. dilaudid had been increased to 0.3 mg. Febrile this AM with tachycardia and SBP in 80. Hb 6, now will get prbcs +/- Blous  Patient: she reports diffuse pain, legs arm, wrist; she could not characterize if this is a worsening of her baseline pain, she was too distraught.  dilaudid IVP held due to SBP tutu of 75. No challenges with mentation  PRN use: 5 prn dilaudid doses in 24 hours        RECENT VITALS/LABS/MEDICATIONS/ASSAYS     20 @ 11:11    T(C): 38.2 (20 @ 09:38), Max: 38.2 (20 @ 09:38)  HR: 101 (20 @ 09:38) (96 - 113)  BP: 87/54 (20 @ 09:38) (87/54 - 139/87)  RR: 20 (20 @ 09:38) (18 - 20)  SpO2: 100% (20 @ 09:38) (99% - 100%)  Wt(kg): --      21 May 2020 07:01  -  22 May 2020 07:00  --------------------------------------------------------  IN:    HYDROmorphone  Infusion: 2.9 mL    Oral Fluid: 1060 mL    sodium chloride 0.9%.: 1435 mL  Total IN: 2497.9 mL    OUT:    Stool: 250 mL    Voided: 2750 mL  Total OUT: 3000 mL    Total NET: -502.1 mL           @ 07:01  -   @ 07:00  --------------------------------------------------------  IN: 2497.9 mL / OUT: 3000 mL / NET: -502.1 mL      CAPILLARY BLOOD GLUCOSE            allopurinol 300 milliGRAM(s) Oral daily  caspofungin IVPB      caspofungin IVPB 70 milliGRAM(s) IV Intermittent once  cefepime   IVPB 2000 milliGRAM(s) IV Intermittent once  cefepime   IVPB 2000 milliGRAM(s) IV Intermittent every 8 hours  cefepime   IVPB      chlorhexidine 4% Liquid 1 Application(s) Topical <User Schedule>  cytarabine PF IntraThecal (eMAR) 50 milliGRAM(s) IntraThecal once  dexAMETHasone  Injectable 4 milliGRAM(s) IV Push every 6 hours  HYDROmorphone  Injectable 0.5 milliGRAM(s) IV Push once  HYDROmorphone Infusion. 0.3 mG/Hr IV Continuous <Continuous>  imatinib 400 milliGRAM(s) Oral two times a day  lactulose Syrup 10 Gram(s) Oral every 8 hours  levETIRAcetam 500 milliGRAM(s) Oral two times a day  lidocaine   Patch 1 Patch Transdermal every 24 hours  melatonin 5 milliGRAM(s) Oral at bedtime  methotrexate PF IntraThecal (eMAR) 12 milliGRAM(s) IntraThecal once  methyl salicylate 15%/menthol 10% Topical Cream 1 Application(s) Topical three times a day PRN  naloxone Injectable 0.1 milliGRAM(s) IV Push every 3 minutes PRN  ondansetron Injectable 8 milliGRAM(s) IV Push every 8 hours PRN  pantoprazole    Tablet 40 milliGRAM(s) Oral before breakfast  polyethylene glycol 3350 17 Gram(s) Oral every 12 hours  senna 2 Tablet(s) Oral every 12 hours  sodium chloride 0.9% Bolus 250 milliLiter(s) IV Bolus once  sodium chloride 0.9% lock flush 10 milliLiter(s) IV Push every 1 hour PRN  sodium chloride 0.9%. 500 milliLiter(s) IV Continuous <Continuous>  ursodiol Capsule 300 milliGRAM(s) Oral every 12 hours              135  |  106  |  30<H>  ----------------------------<  194<H>  4.5   |  17<L>  |  0.86    Ca    9.2      22 May 2020 06:58  Phos  3.1       Mg     1.9         TPro  5.5<L>  /  Alb  2.9<L>  /  TBili  1.2  /  DBili  0.7<H>  /  AST  65<H>  /  ALT  153<H>  /  AlkPhos  71  05-22      Procalc  BNP  ABG                          6.0    <0.10 )-----------( 13       ( 22 May 2020 06:58 )             18.3   PT/INR - ( 21 May 2020 09:38 )   PT: 10.5 sec;   INR: 0.92 ratio         PTT - ( 21 May 2020 09:38 )  PTT:21.6 sec        blood and urine cultures                      PERTINENT PM/SXH:   Herpes zoster  Leukemia  Clostridium difficile diarrhea  Intractable migraine with status migrainosus, unspecified migraine type  Sciatica of right side  Chronic gastritis, presence of bleeding unspecified, unspecified gastritis type  Essential hypertension    Lipoma  Elective surgical procedure  History of  delivery    FAMILY HISTORY:  No pertinent family history in first degree relatives    ITEMS NOT CHECKED ARE NOT PRESENT    SOCIAL HISTORY:   Significant other/partner[ ]  Children[x ]  Scientology/Spirituality:  Substance hx:  [ ]   Tobacco hx:  [ ]   Alcohol hx: [ ]   Home Opioid hx:  [ ] I-Stop Reference No:  Living Situation: [ x]Home  [ ]Long term care  [ ]Rehab [ ]Other    ADVANCE DIRECTIVES:    DNR  MOLST  [ ]  Living Will  [ ]   DECISION MAKER(s):  [ ] Health Care Proxy(s)  [x ] Surrogate(s)  [ ] Guardian           Name(s): Phone Number(s):    BASELINE (I)ADL(s) (prior to admission):  [place]: [ ]Total  [ ] Moderate [ ]Dependent    Allergies    No Known Drug Allergies  shellfish (Nausea; Vomiting)    Intolerances    MEDICATIONS  (STANDING):       PRESENT SYMPTOMS: [ ]Unable to obtain due to poor mentation   Source if other than patient:  [ ]Family   [ ]Team     Pain: [x ]yes [ ]no  QOL impact -  See above  Location -                    Aggravating factors -  Quality -  Radiation -  Timing-  Severity (0-10 scale):  Minimal acceptable level (0-10 scale):     PAIN AD Score:     [email or web address] (press ctrl +  left click to view)    Dyspnea:                           [ ]Mild [ ]Moderate [ ]Severe  Anxiety:                             [ ]Mild [ ]Moderate [ ]Severe  Fatigue:                             [ x]Mild [ ]Moderate [ ]Severe  Nausea:                             [ ]Mild [ ]Moderate [ ]Severe  Loss of appetite:              [ ]Mild [ ]Moderate [ ]Severe  Constipation:                    [x ]Mild [ ]Moderate [ ]Severe    Other Symptoms:  [x ]All other review of systems negative     Palliative Performance Status Version 2:      40   %    [email or web address]       GENERAL:  [x ]Alert  [x ]Oriented x  3  [ ]Lethargic  [ ]Cachexia  [ ]Unarousable  [ ]Verbal  [ ]Non-Verbal  Behavioral:   [ ] Anxiety  [ ] Delirium [ ] Agitation [x ] Other in pain  HEENT:  [ ]Normal   [ x]Dry mouth   [ ]ET Tube/Trach  [ ]Oral lesions  PULMONARY:   [  ]Clear [ ]Tachypnea  [ ]Audible excessive secretions   [ ]Rhonchi        [ ]Right [ ]Left [ ]Bilateral  [ ]Crackles        [ ]Right [ ]Left [ ]Bilateral  [ ]Wheezing     [ ]Right [ ]Left [ ]Bilatera  [x ]Diminished breath sounds [ ]right [ ]left [ ]bilateral  CARDIOVASCULAR:    [ x]Regular [ ]Irregular [ ]Tachy  [ ]Ellis [ ]Murmur [ ]Other  GASTROINTESTINAL:  [ x]Soft  [ x]Distended   [ ]+BS  [x ]Non tender [ ]Tender  [ ]PEG [ ]OGT/ NGT  Last BM:     GENITOURINARY:  [  Normal [ ] Incontinent   [ ]Oliguria/Anuria   [ ]Deng  MUSCULOSKELETAL:     ]Normal   [x ]Weakness  [ ]Bed/Wheelchair bound [ ]Edema back pain  NEUROLOGIC:   [ No focal deficits  [ ]Cognitive impairment  [ ]Dysphagia [ ]Dysarthria [ ]Paresis [ sx]Other paresthesias   SKIN:   [x ]Normal    [ ]Rash  [ ]Pressure ulcer(s)       Present on admission [ ]y [ ]n    CRITICAL CARE:  [ ] Shock Present  [ ]Septic [ ]Cardiogenic [ ]Neurologic [ ]Hypovolemic  [ ]  Vasopressors [ ]  Inotropes   [ ]Respiratory failure present [ ]Mechanical ventilation [ ]Non-invasive ventilatory support [ ]High flow  [ ]Acute  [ ]Chronic [ ]Hypoxic  [ ]Hypercarbic [ ]Other  [ ]Other organ failure     LABS:                        7.9    11.02 )-----------( 17       ( 15 May 2020 09:47 )             24.3   05-15    134<L>  |  101  |  19  ----------------------------<  112<H>  5.1   |  24  |  0.97    Ca    9.2      15 May 2020 09:47  Phos  4.0     -15  Mg     2.1     05-15    TPro  6.3  /  Alb  3.1<L>  /  TBili  0.2  /  DBili  <0.1  /  AST  21  /  ALT  23  /  AlkPhos  65  -15  PT/INR - ( 14 May 2020 10:29 )   PT: 10.4 sec;   INR: 0.91 ratio         PTT - ( 14 May 2020 10:29 )  PTT:25.4 sec      RADIOLOGY & ADDITIONAL STUDIES:    PROTEIN CALORIE MALNUTRITION PRESENT: [ ]mild [ ]moderate [ ]severe [ ]underweight [ ]morbid obesity  https://www.andeal.org/vault/2440/web/files/ONC/Table_Clinical%20Characteristics%20to%20Document%20Malnutrition-White%20JV%20et%20al%359722.pdf    Height (cm): 160.02 (20 @ 15:31)  Weight (kg): 104.3 (20 @ 15:31)  BMI (kg/m2): 40.7 (20 @ 15:31)    [ ]PPSV2 < or = to 30% [ ]significant weight loss  [ ]poor nutritional intake  [ ]anasarca     Albumin, Serum: 3.1 g/dL (05-15-20 @ 09:47)   [ ]Artificial Nutrition      REFERRALS:   [ ]Chaplaincy  [ ]Hospice  [ ]Child Life  [ ]Social Work  [ ]Case management [ ]Holistic Therapy     Goals of Care Document:

## 2020-05-22 NOTE — CHART NOTE - NSCHARTNOTEFT_GEN_A_CORE
Pt's hypotension resolved post midodrine  IVF and pRBCs administered  Forthcoming pRBC and platelets  Consider PRNs only given her abrupt change in hypotension  Pt was on higher doses of dilaudid for several days without incident, unlikely to have diminished tolerance with continued use  IV Dilaudid 0.4 X 1 in the absence of hypotension   Would monitor BP post administration  IV fentanyl permissible in certain unit only, not BMAR        In the event of newly developing, evolving, or worsening symptoms, please contact the Palliative Medicine team via pager (if the patient is at [facility] #[phone] or if the patient is at [facility] #[phone]) The Geriatric and Palliative Medicine service has coverage 24 hours a day/ 7 days a week to provide medical recommendations regarding symptom management needs via telephone

## 2020-05-22 NOTE — RAPID RESPONSE TEAM SUMMARY - NSSITUATIONBACKGROUNDRRT_GEN_ALL_CORE
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    RRT called for hypotension. Upon arrival, patient completing PRBC transfusion for anemia to 6 this morning. VS reveal manual BP 70s/40s, febrile to 101, HR 120s, sat 99% on 5LNC. Patient with new fever for this admission with associated hypotension, concerning for new septic shock. Patient also with abdominal pain, and per nursing with 3large non bloody BM in last 12 hours, receiving diluadid drip at .3mg/hour, which was increased from .2mg/ hour approx 1-2 hours ago.    Patient started on IVF bolus 500cc, and given 1gram IV tylenol. Blood cultures x1 taken from PICC as well as VBG to evaluate lactate. Patient started on broad spectrum abx, cefepime, vanco and caspofungin. Given 20 mg of oral midodrine. IV dilaudid stopped at this time as it likely contributing to hypotension. CXR clear; patient taken off oxygen with sat 100%.    Plan:  Administer at least 1L of IVF and additional 1 unit PRBC  C/w antibiotic and antifungal coverage  Follow up VBG and if lactate elevated, trend lactate after IVF and PRBC; if still still elevated, will need additional fluid resuscitation.  If persistently hypotensive despite fluids and midodrine, may require MICU admission.    Plan discussed with NP. 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    RRT called for hypotension. Upon arrival, patient completing PRBC transfusion for anemia to 6 this morning. VS reveal manual BP 70s/40s, febrile to 101, HR 120s, sat 99% on 5LNC. Patient with new fever for this admission with associated hypotension, concerning for new septic shock. Patient also with abdominal pain, and per nursing with 3large non bloody BM in last 12 hours, receiving diluadid drip at .3mg/hour, which was increased from .2mg/ hour approx 1-2 hours ago.    Patient started on IVF bolus 500cc, and given 1gram IV tylenol. Blood cultures x1 taken from PICC as well as VBG to evaluate lactate. Patient started on broad spectrum abx, cefepime, vanco and caspofungin. Given 20 mg of oral midodrine. IV dilaudid stopped at this time as it likely contributing to hypotension. CXR clear; patient taken off oxygen with sat 100%.    Plan:  Administer at least 1L of IVF and additional 1 unit PRBC  Obtain second set of blood cultures and additional IV line  C/w antibiotic and antifungal coverage  Complete IV tylenol--> will likely improve HR and thus improve BP.  Follow up VBG and if lactate elevated, trend lactate after IVF and PRBC; if still still elevated, will need additional fluid resuscitation.  If persistently hypotensive despite fluids and midodrine, may require MICU admission.    Plan discussed with NP.

## 2020-05-22 NOTE — CHART NOTE - NSCHARTNOTEFT_GEN_A_CORE
Notified by RN of critical lab value result.  Blood culture dated 5/22/2020, "gram negative rods in aerobic bottle".    Balbina Erickson, ANP ext [phone] Notified by RN of critical lab value result.  Blood culture dated 5/22/2020, "gram negative rods in aerobic bottle". Pt. had been started on cefepime earlier in day; continue cefepime; f/u culture sensitivity. f/u with primary team in am.    Balbina Erickson, ANP ext [phone] Notified by RN of critical lab value result and Pt. with continued complaint of pain.  Blood culture dated 5/22/2020, "gram negative rods in aerobic bottle". Pt. had been started on cefepime earlier in day; continue cefepime; f/u culture sensitivity.     Pt. seen and evaluated at bedside. A&o x 3; intermittent moaning.  Pt states "My shoulder hurts".  Endorses pain starting previous day.  Unable to describe or quantify pain.  Denies falling; denies recent trauma to area.  Denies chest pain, SOB, palpitation, abdominal pain, n/v/d; continues to complain of "pain all over".  Left shoulder tender to palpation; decreased ROM of left shoulder secondary to pain. Xray L shoulder ordered; per radiology, xray to be done in am.  Tylenol 650 mg IV x 1 ordered.  Continue to closely monitor.    f/u with primary team in am.    Balbina Erickson, ANP ext [phone]

## 2020-05-22 NOTE — PROGRESS NOTE ADULT - PROBLEM SELECTOR PLAN 2
Predominant symptom previously LE/back pain, now with complaints of generalized diffuse body pain, even tender with movement    Concern for LBP/leg/arm pain to be related to CSF disease   spinal involvement of leukemia +/- Chronic pain  Degree of control: Poorly controlled  Relative to previous day: worse  Current treatment regimen: dilaudid 0.3 gtt  Recommendations: maintain dilaudid 0.3 gtt  PRN IV dilaudid 0.5 mg q3H hold for respiratory depression/hypotension  NSAIDs contraindicated at this time.  Would avoid ATC APAP in this patient  Risk mitigation/Bowel regimen:   -avoid BZD/hypnotics/respiratory depressants  -monitor for changes in CrCl due to TLS, which would require dosing/administration changes  -As IT ChRx treatment begins to take effect, the need for opioids may decrease so monitor for new or evolving sedation/lethargy   Bowel regimen changed. See below  Adverse events noted: none at this time  Maintain holistic nurse to assist with CAM therapies

## 2020-05-23 DIAGNOSIS — A41.9 SEPSIS, UNSPECIFIED ORGANISM: ICD-10-CM

## 2020-05-23 DIAGNOSIS — R78.81 BACTEREMIA: ICD-10-CM

## 2020-05-23 LAB
ALBUMIN SERPL ELPH-MCNC: 2.6 G/DL — LOW (ref 3.3–5)
ALP SERPL-CCNC: 49 U/L — SIGNIFICANT CHANGE UP (ref 40–120)
ALT FLD-CCNC: 502 U/L — HIGH (ref 10–45)
ANION GAP SERPL CALC-SCNC: 23 MMOL/L — HIGH (ref 5–17)
ANION GAP SERPL CALC-SCNC: 24 MMOL/L — HIGH (ref 5–17)
APPEARANCE UR: ABNORMAL
APTT BLD: 27.8 SEC — SIGNIFICANT CHANGE UP (ref 27.5–36.3)
AST SERPL-CCNC: 425 U/L — HIGH (ref 10–40)
BACTERIA # UR AUTO: NEGATIVE — SIGNIFICANT CHANGE UP
BASE EXCESS BLDV CALC-SCNC: -17.1 MMOL/L — LOW (ref -2–2)
BILIRUB SERPL-MCNC: 0.8 MG/DL — SIGNIFICANT CHANGE UP (ref 0.2–1.2)
BILIRUB UR-MCNC: NEGATIVE — SIGNIFICANT CHANGE UP
BLD GP AB SCN SERPL QL: NEGATIVE — SIGNIFICANT CHANGE UP
BUN SERPL-MCNC: 57 MG/DL — HIGH (ref 7–23)
BUN SERPL-MCNC: 62 MG/DL — HIGH (ref 7–23)
CA-I SERPL-SCNC: 1.13 MMOL/L — SIGNIFICANT CHANGE UP (ref 1.12–1.3)
CALCIUM SERPL-MCNC: 7 MG/DL — LOW (ref 8.4–10.5)
CALCIUM SERPL-MCNC: 7.8 MG/DL — LOW (ref 8.4–10.5)
CHLORIDE BLDV-SCNC: 111 MMOL/L — HIGH (ref 96–108)
CHLORIDE SERPL-SCNC: 104 MMOL/L — SIGNIFICANT CHANGE UP (ref 96–108)
CHLORIDE SERPL-SCNC: 104 MMOL/L — SIGNIFICANT CHANGE UP (ref 96–108)
CK MB BLD-MCNC: 3.1 % — SIGNIFICANT CHANGE UP (ref 0–3.5)
CK MB CFR SERPL CALC: 12 NG/ML — HIGH (ref 0–3.8)
CK SERPL-CCNC: 390 U/L — HIGH (ref 25–170)
CO2 BLDV-SCNC: 12 MMOL/L — LOW (ref 22–30)
CO2 SERPL-SCNC: <10 MMOL/L — CRITICAL LOW (ref 22–31)
CO2 SERPL-SCNC: <10 MMOL/L — CRITICAL LOW (ref 22–31)
COD CRY URNS QL: ABNORMAL
COLOR SPEC: ABNORMAL
CREAT SERPL-MCNC: 2.02 MG/DL — HIGH (ref 0.5–1.3)
CREAT SERPL-MCNC: 2.21 MG/DL — HIGH (ref 0.5–1.3)
CULTURE RESULTS: SIGNIFICANT CHANGE UP
DIFF PNL FLD: NEGATIVE — SIGNIFICANT CHANGE UP
E COLI DNA BLD POS QL NAA+NON-PROBE: SIGNIFICANT CHANGE UP
EPI CELLS # UR: 6 /HPF — HIGH (ref 0–5)
FIBRINOGEN PPP-MCNC: 1036 MG/DL — HIGH (ref 350–510)
GAS PNL BLDA: SIGNIFICANT CHANGE UP
GAS PNL BLDA: SIGNIFICANT CHANGE UP
GAS PNL BLDV: 133 MMOL/L — LOW (ref 135–145)
GAS PNL BLDV: SIGNIFICANT CHANGE UP
GAS PNL BLDV: SIGNIFICANT CHANGE UP
GLUCOSE BLDV-MCNC: 81 MG/DL — SIGNIFICANT CHANGE UP (ref 70–99)
GLUCOSE SERPL-MCNC: 67 MG/DL — LOW (ref 70–99)
GLUCOSE SERPL-MCNC: 80 MG/DL — SIGNIFICANT CHANGE UP (ref 70–99)
GLUCOSE UR QL: NEGATIVE — SIGNIFICANT CHANGE UP
GRAM STN FLD: SIGNIFICANT CHANGE UP
HAPTOGLOB SERPL-MCNC: 166 MG/DL — SIGNIFICANT CHANGE UP (ref 34–200)
HCO3 BLDV-SCNC: 11 MMOL/L — LOW (ref 21–29)
HCT VFR BLD CALC: 23.5 % — LOW (ref 34.5–45)
HCT VFR BLD CALC: 26.5 % — LOW (ref 34.5–45)
HCT VFR BLDA CALC: 26 % — LOW (ref 39–50)
HGB BLD CALC-MCNC: 8.5 G/DL — LOW (ref 11.5–15.5)
HGB BLD-MCNC: 7.5 G/DL — LOW (ref 11.5–15.5)
HGB BLD-MCNC: 8.3 G/DL — LOW (ref 11.5–15.5)
HOROWITZ INDEX BLDV+IHG-RTO: 40 — SIGNIFICANT CHANGE UP
HYALINE CASTS # UR AUTO: 1 /LPF — SIGNIFICANT CHANGE UP (ref 0–7)
INR BLD: 1.69 RATIO — HIGH (ref 0.88–1.16)
KETONES UR-MCNC: SIGNIFICANT CHANGE UP
LACTATE BLDV-MCNC: 5.5 MMOL/L — CRITICAL HIGH (ref 0.7–2)
LACTATE SERPL-SCNC: 5.8 MMOL/L — CRITICAL HIGH (ref 0.7–2)
LACTATE SERPL-SCNC: 9.8 MMOL/L — CRITICAL HIGH (ref 0.7–2)
LEUKOCYTE ESTERASE UR-ACNC: NEGATIVE — SIGNIFICANT CHANGE UP
MAGNESIUM SERPL-MCNC: 2.2 MG/DL — SIGNIFICANT CHANGE UP (ref 1.6–2.6)
MCHC RBC-ENTMCNC: 30.4 PG — SIGNIFICANT CHANGE UP (ref 27–34)
MCHC RBC-ENTMCNC: 30.6 PG — SIGNIFICANT CHANGE UP (ref 27–34)
MCHC RBC-ENTMCNC: 31.3 GM/DL — LOW (ref 32–36)
MCHC RBC-ENTMCNC: 31.9 GM/DL — LOW (ref 32–36)
MCV RBC AUTO: 95.1 FL — SIGNIFICANT CHANGE UP (ref 80–100)
MCV RBC AUTO: 97.8 FL — SIGNIFICANT CHANGE UP (ref 80–100)
METHOD TYPE: SIGNIFICANT CHANGE UP
NITRITE UR-MCNC: NEGATIVE — SIGNIFICANT CHANGE UP
NRBC # BLD: 138 /100 WBCS — HIGH (ref 0–0)
NRBC # BLD: 33 /100 WBCS — HIGH (ref 0–0)
OTHER CELLS CSF MANUAL: 10 ML/DL — LOW (ref 18–22)
PCO2 BLDV: 38 MMHG — SIGNIFICANT CHANGE UP (ref 35–50)
PH BLDV: 7.1 — CRITICAL LOW (ref 7.35–7.45)
PH UR: 6 — SIGNIFICANT CHANGE UP (ref 5–8)
PHOSPHATE SERPL-MCNC: 10.9 MG/DL — HIGH (ref 2.5–4.5)
PLATELET # BLD AUTO: 16 K/UL — CRITICAL LOW (ref 150–400)
PLATELET # BLD AUTO: 64 K/UL — LOW (ref 150–400)
PO2 BLDV: 67 MMHG — HIGH (ref 25–45)
POTASSIUM BLDV-SCNC: 6.3 MMOL/L — CRITICAL HIGH (ref 3.5–5.3)
POTASSIUM SERPL-MCNC: 6.6 MMOL/L — CRITICAL HIGH (ref 3.5–5.3)
POTASSIUM SERPL-MCNC: 7.4 MMOL/L — CRITICAL HIGH (ref 3.5–5.3)
POTASSIUM SERPL-SCNC: 6.6 MMOL/L — CRITICAL HIGH (ref 3.5–5.3)
POTASSIUM SERPL-SCNC: 7.4 MMOL/L — CRITICAL HIGH (ref 3.5–5.3)
PROCALCITONIN SERPL-MCNC: >100 NG/ML — HIGH (ref 0.02–0.1)
PROT SERPL-MCNC: 5.8 G/DL — LOW (ref 6–8.3)
PROT UR-MCNC: ABNORMAL
PROTHROM AB SERPL-ACNC: 19.7 SEC — HIGH (ref 10–12.9)
RBC # BLD: 2.47 M/UL — LOW (ref 3.8–5.2)
RBC # BLD: 2.71 M/UL — LOW (ref 3.8–5.2)
RBC # FLD: 16.5 % — HIGH (ref 10.3–14.5)
RBC # FLD: 17 % — HIGH (ref 10.3–14.5)
RBC CASTS # UR COMP ASSIST: 0 /HPF — SIGNIFICANT CHANGE UP (ref 0–4)
RH IG SCN BLD-IMP: POSITIVE — SIGNIFICANT CHANGE UP
SAO2 % BLDV: 86 % — SIGNIFICANT CHANGE UP (ref 67–88)
SODIUM SERPL-SCNC: 134 MMOL/L — LOW (ref 135–145)
SODIUM SERPL-SCNC: 136 MMOL/L — SIGNIFICANT CHANGE UP (ref 135–145)
SP GR SPEC: 1.02 — SIGNIFICANT CHANGE UP (ref 1.01–1.02)
SPECIMEN SOURCE: SIGNIFICANT CHANGE UP
URATE SERPL-MCNC: 5.6 MG/DL — SIGNIFICANT CHANGE UP (ref 2.5–7)
UROBILINOGEN FLD QL: SIGNIFICANT CHANGE UP
WBC # BLD: <0.1 K/UL — CRITICAL LOW (ref 3.8–10.5)
WBC # BLD: <0.1 K/UL — CRITICAL LOW (ref 3.8–10.5)
WBC # FLD AUTO: <0.1 K/UL — CRITICAL LOW (ref 3.8–10.5)
WBC # FLD AUTO: <0.1 K/UL — CRITICAL LOW (ref 3.8–10.5)
WBC UR QL: 5 /HPF — SIGNIFICANT CHANGE UP (ref 0–5)

## 2020-05-23 PROCEDURE — 83615 LACTATE (LD) (LDH) ENZYME: CPT

## 2020-05-23 PROCEDURE — 70450 CT HEAD/BRAIN W/O DYE: CPT

## 2020-05-23 PROCEDURE — A9585: CPT

## 2020-05-23 PROCEDURE — 88280 CHROMOSOME KARYOTYPE STUDY: CPT

## 2020-05-23 PROCEDURE — 99291 CRITICAL CARE FIRST HOUR: CPT | Mod: 25

## 2020-05-23 PROCEDURE — 71045 X-RAY EXAM CHEST 1 VIEW: CPT | Mod: 26,76

## 2020-05-23 PROCEDURE — 93005 ELECTROCARDIOGRAM TRACING: CPT

## 2020-05-23 PROCEDURE — 97116 GAIT TRAINING THERAPY: CPT

## 2020-05-23 PROCEDURE — 88313 SPECIAL STAINS GROUP 2: CPT

## 2020-05-23 PROCEDURE — 81001 URINALYSIS AUTO W/SCOPE: CPT

## 2020-05-23 PROCEDURE — 36600 WITHDRAWAL OF ARTERIAL BLOOD: CPT

## 2020-05-23 PROCEDURE — 85384 FIBRINOGEN ACTIVITY: CPT

## 2020-05-23 PROCEDURE — 84157 ASSAY OF PROTEIN OTHER: CPT

## 2020-05-23 PROCEDURE — 96376 TX/PRO/DX INJ SAME DRUG ADON: CPT

## 2020-05-23 PROCEDURE — 80048 BASIC METABOLIC PNL TOTAL CA: CPT

## 2020-05-23 PROCEDURE — 84145 PROCALCITONIN (PCT): CPT

## 2020-05-23 PROCEDURE — 84443 ASSAY THYROID STIM HORMONE: CPT

## 2020-05-23 PROCEDURE — 99232 SBSQ HOSP IP/OBS MODERATE 35: CPT

## 2020-05-23 PROCEDURE — 94002 VENT MGMT INPAT INIT DAY: CPT

## 2020-05-23 PROCEDURE — 82962 GLUCOSE BLOOD TEST: CPT

## 2020-05-23 PROCEDURE — 84100 ASSAY OF PHOSPHORUS: CPT

## 2020-05-23 PROCEDURE — 82435 ASSAY OF BLOOD CHLORIDE: CPT

## 2020-05-23 PROCEDURE — 87040 BLOOD CULTURE FOR BACTERIA: CPT

## 2020-05-23 PROCEDURE — 83010 ASSAY OF HAPTOGLOBIN QUANT: CPT

## 2020-05-23 PROCEDURE — 88185 FLOWCYTOMETRY/TC ADD-ON: CPT

## 2020-05-23 PROCEDURE — 89051 BODY FLUID CELL COUNT: CPT

## 2020-05-23 PROCEDURE — P9037: CPT

## 2020-05-23 PROCEDURE — 70551 MRI BRAIN STEM W/O DYE: CPT

## 2020-05-23 PROCEDURE — 72158 MRI LUMBAR SPINE W/O & W/DYE: CPT

## 2020-05-23 PROCEDURE — 83605 ASSAY OF LACTIC ACID: CPT

## 2020-05-23 PROCEDURE — 74177 CT ABD & PELVIS W/CONTRAST: CPT

## 2020-05-23 PROCEDURE — 73030 X-RAY EXAM OF SHOULDER: CPT

## 2020-05-23 PROCEDURE — 36620 INSERTION CATHETER ARTERY: CPT | Mod: GC

## 2020-05-23 PROCEDURE — 88264 CHROMOSOME ANALYSIS 20-25: CPT

## 2020-05-23 PROCEDURE — 86901 BLOOD TYPING SEROLOGIC RH(D): CPT

## 2020-05-23 PROCEDURE — P9011: CPT

## 2020-05-23 PROCEDURE — 84550 ASSAY OF BLOOD/URIC ACID: CPT

## 2020-05-23 PROCEDURE — 87205 SMEAR GRAM STAIN: CPT

## 2020-05-23 PROCEDURE — 87635 SARS-COV-2 COVID-19 AMP PRB: CPT

## 2020-05-23 PROCEDURE — 72141 MRI NECK SPINE W/O DYE: CPT

## 2020-05-23 PROCEDURE — 99292 CRITICAL CARE ADDL 30 MIN: CPT | Mod: 25

## 2020-05-23 PROCEDURE — 85610 PROTHROMBIN TIME: CPT

## 2020-05-23 PROCEDURE — 81206 BCR/ABL1 GENE MAJOR BP: CPT

## 2020-05-23 PROCEDURE — 85097 BONE MARROW INTERPRETATION: CPT

## 2020-05-23 PROCEDURE — 82330 ASSAY OF CALCIUM: CPT

## 2020-05-23 PROCEDURE — 70552 MRI BRAIN STEM W/DYE: CPT

## 2020-05-23 PROCEDURE — 88275 CYTOGENETICS 100-300: CPT

## 2020-05-23 PROCEDURE — 88271 CYTOGENETICS DNA PROBE: CPT

## 2020-05-23 PROCEDURE — 97162 PT EVAL MOD COMPLEX 30 MIN: CPT

## 2020-05-23 PROCEDURE — 88237 TISSUE CULTURE BONE MARROW: CPT

## 2020-05-23 PROCEDURE — 88184 FLOWCYTOMETRY/ TC 1 MARKER: CPT

## 2020-05-23 PROCEDURE — 82248 BILIRUBIN DIRECT: CPT

## 2020-05-23 PROCEDURE — 74018 RADEX ABDOMEN 1 VIEW: CPT | Mod: 26

## 2020-05-23 PROCEDURE — 72128 CT CHEST SPINE W/O DYE: CPT

## 2020-05-23 PROCEDURE — 74018 RADEX ABDOMEN 1 VIEW: CPT

## 2020-05-23 PROCEDURE — 86922 COMPATIBILITY TEST ANTIGLOB: CPT

## 2020-05-23 PROCEDURE — 85730 THROMBOPLASTIN TIME PARTIAL: CPT

## 2020-05-23 PROCEDURE — 71045 X-RAY EXAM CHEST 1 VIEW: CPT

## 2020-05-23 PROCEDURE — 85652 RBC SED RATE AUTOMATED: CPT

## 2020-05-23 PROCEDURE — 72157 MRI CHEST SPINE W/O & W/DYE: CPT

## 2020-05-23 PROCEDURE — 87186 SC STD MICRODIL/AGAR DIL: CPT

## 2020-05-23 PROCEDURE — 97110 THERAPEUTIC EXERCISES: CPT

## 2020-05-23 PROCEDURE — 84295 ASSAY OF SERUM SODIUM: CPT

## 2020-05-23 PROCEDURE — 86900 BLOOD TYPING SEROLOGIC ABO: CPT

## 2020-05-23 PROCEDURE — 72142 MRI NECK SPINE W/DYE: CPT

## 2020-05-23 PROCEDURE — 80053 COMPREHEN METABOLIC PANEL: CPT

## 2020-05-23 PROCEDURE — 87070 CULTURE OTHR SPECIMN AEROBIC: CPT

## 2020-05-23 PROCEDURE — 31500 INSERT EMERGENCY AIRWAY: CPT | Mod: GC

## 2020-05-23 PROCEDURE — 86902 BLOOD TYPE ANTIGEN DONOR EA: CPT

## 2020-05-23 PROCEDURE — 82945 GLUCOSE OTHER FLUID: CPT

## 2020-05-23 PROCEDURE — 83735 ASSAY OF MAGNESIUM: CPT

## 2020-05-23 PROCEDURE — 99232 SBSQ HOSP IP/OBS MODERATE 35: CPT | Mod: GC,25

## 2020-05-23 PROCEDURE — 85014 HEMATOCRIT: CPT

## 2020-05-23 PROCEDURE — 81207 BCR/ABL1 GENE MINOR BP: CPT

## 2020-05-23 PROCEDURE — 84132 ASSAY OF SERUM POTASSIUM: CPT

## 2020-05-23 PROCEDURE — 96374 THER/PROPH/DIAG INJ IV PUSH: CPT | Mod: XU

## 2020-05-23 PROCEDURE — 85027 COMPLETE CBC AUTOMATED: CPT

## 2020-05-23 PROCEDURE — 82803 BLOOD GASES ANY COMBINATION: CPT

## 2020-05-23 PROCEDURE — 82947 ASSAY GLUCOSE BLOOD QUANT: CPT

## 2020-05-23 PROCEDURE — 85379 FIBRIN DEGRADATION QUANT: CPT

## 2020-05-23 PROCEDURE — 93010 ELECTROCARDIOGRAM REPORT: CPT

## 2020-05-23 PROCEDURE — 87150 DNA/RNA AMPLIFIED PROBE: CPT

## 2020-05-23 PROCEDURE — 36800 INSERTION OF CANNULA: CPT | Mod: GC

## 2020-05-23 PROCEDURE — 85049 AUTOMATED PLATELET COUNT: CPT

## 2020-05-23 PROCEDURE — 99285 EMERGENCY DEPT VISIT HI MDM: CPT | Mod: 25

## 2020-05-23 PROCEDURE — 82553 CREATINE MB FRACTION: CPT

## 2020-05-23 PROCEDURE — C1751: CPT

## 2020-05-23 PROCEDURE — 36430 TRANSFUSION BLD/BLD COMPNT: CPT

## 2020-05-23 PROCEDURE — 86850 RBC ANTIBODY SCREEN: CPT

## 2020-05-23 PROCEDURE — 86140 C-REACTIVE PROTEIN: CPT

## 2020-05-23 PROCEDURE — 36556 INSERT NON-TUNNEL CV CATH: CPT

## 2020-05-23 PROCEDURE — P9040: CPT

## 2020-05-23 PROCEDURE — 82550 ASSAY OF CK (CPK): CPT

## 2020-05-23 PROCEDURE — 87086 URINE CULTURE/COLONY COUNT: CPT

## 2020-05-23 PROCEDURE — 36573 INSJ PICC RS&I 5 YR+: CPT

## 2020-05-23 PROCEDURE — 99223 1ST HOSP IP/OBS HIGH 75: CPT | Mod: GC

## 2020-05-23 PROCEDURE — 73030 X-RAY EXAM OF SHOULDER: CPT | Mod: 26,LT

## 2020-05-23 PROCEDURE — 88305 TISSUE EXAM BY PATHOLOGIST: CPT

## 2020-05-23 RX ORDER — HYDROMORPHONE HYDROCHLORIDE 2 MG/ML
0.25 INJECTION INTRAMUSCULAR; INTRAVENOUS; SUBCUTANEOUS ONCE
Refills: 0 | Status: DISCONTINUED | OUTPATIENT
Start: 2020-05-23 | End: 2020-05-23

## 2020-05-23 RX ORDER — SODIUM CHLORIDE 9 MG/ML
10 INJECTION INTRAMUSCULAR; INTRAVENOUS; SUBCUTANEOUS
Refills: 0 | Status: DISCONTINUED | OUTPATIENT
Start: 2020-05-23 | End: 2020-05-23

## 2020-05-23 RX ORDER — HYDROMORPHONE HYDROCHLORIDE 2 MG/ML
0.5 INJECTION INTRAMUSCULAR; INTRAVENOUS; SUBCUTANEOUS ONCE
Refills: 0 | Status: DISCONTINUED | OUTPATIENT
Start: 2020-05-23 | End: 2020-05-23

## 2020-05-23 RX ORDER — MIDAZOLAM HYDROCHLORIDE 1 MG/ML
1 INJECTION, SOLUTION INTRAMUSCULAR; INTRAVENOUS ONCE
Refills: 0 | Status: DISCONTINUED | OUTPATIENT
Start: 2020-05-23 | End: 2020-05-23

## 2020-05-23 RX ORDER — DEXTROSE 50 % IN WATER 50 %
50 SYRINGE (ML) INTRAVENOUS ONCE
Refills: 0 | Status: DISCONTINUED | OUTPATIENT
Start: 2020-05-23 | End: 2020-05-23

## 2020-05-23 RX ORDER — MIDAZOLAM HYDROCHLORIDE 1 MG/ML
4 INJECTION, SOLUTION INTRAMUSCULAR; INTRAVENOUS ONCE
Refills: 0 | Status: DISCONTINUED | OUTPATIENT
Start: 2020-05-23 | End: 2020-05-23

## 2020-05-23 RX ORDER — CHLORHEXIDINE GLUCONATE 213 G/1000ML
1 SOLUTION TOPICAL
Refills: 0 | Status: DISCONTINUED | OUTPATIENT
Start: 2020-05-23 | End: 2020-05-23

## 2020-05-23 RX ORDER — NOREPINEPHRINE BITARTRATE/D5W 8 MG/250ML
0.05 PLASTIC BAG, INJECTION (ML) INTRAVENOUS
Qty: 8 | Refills: 0 | Status: DISCONTINUED | OUTPATIENT
Start: 2020-05-23 | End: 2020-05-23

## 2020-05-23 RX ORDER — SODIUM BICARBONATE 1 MEQ/ML
50 SYRINGE (ML) INTRAVENOUS ONCE
Refills: 0 | Status: COMPLETED | OUTPATIENT
Start: 2020-05-23 | End: 2020-05-23

## 2020-05-23 RX ORDER — FENTANYL CITRATE 50 UG/ML
50 INJECTION INTRAVENOUS ONCE
Refills: 0 | Status: DISCONTINUED | OUTPATIENT
Start: 2020-05-23 | End: 2020-05-23

## 2020-05-23 RX ORDER — PROPOFOL 10 MG/ML
10 INJECTION, EMULSION INTRAVENOUS
Qty: 500 | Refills: 0 | Status: DISCONTINUED | OUTPATIENT
Start: 2020-05-23 | End: 2020-05-23

## 2020-05-23 RX ORDER — MIDAZOLAM HYDROCHLORIDE 1 MG/ML
2 INJECTION, SOLUTION INTRAMUSCULAR; INTRAVENOUS ONCE
Refills: 0 | Status: DISCONTINUED | OUTPATIENT
Start: 2020-05-23 | End: 2020-05-23

## 2020-05-23 RX ORDER — FENTANYL CITRATE 50 UG/ML
100 INJECTION INTRAVENOUS ONCE
Refills: 0 | Status: DISCONTINUED | OUTPATIENT
Start: 2020-05-23 | End: 2020-05-23

## 2020-05-23 RX ORDER — VASOPRESSIN 20 [USP'U]/ML
0.04 INJECTION INTRAVENOUS
Qty: 50 | Refills: 0 | Status: DISCONTINUED | OUTPATIENT
Start: 2020-05-23 | End: 2020-05-23

## 2020-05-23 RX ORDER — FUROSEMIDE 40 MG
20 TABLET ORAL ONCE
Refills: 0 | Status: DISCONTINUED | OUTPATIENT
Start: 2020-05-23 | End: 2020-05-23

## 2020-05-23 RX ORDER — NOREPINEPHRINE BITARTRATE/D5W 8 MG/250ML
0.05 PLASTIC BAG, INJECTION (ML) INTRAVENOUS
Qty: 16 | Refills: 0 | Status: DISCONTINUED | OUTPATIENT
Start: 2020-05-23 | End: 2020-05-23

## 2020-05-23 RX ORDER — LEVETIRACETAM 250 MG/1
500 TABLET, FILM COATED ORAL EVERY 12 HOURS
Refills: 0 | Status: DISCONTINUED | OUTPATIENT
Start: 2020-05-23 | End: 2020-05-23

## 2020-05-23 RX ORDER — PANTOPRAZOLE SODIUM 20 MG/1
40 TABLET, DELAYED RELEASE ORAL DAILY
Refills: 0 | Status: DISCONTINUED | OUTPATIENT
Start: 2020-05-23 | End: 2020-05-23

## 2020-05-23 RX ORDER — INSULIN HUMAN 100 [IU]/ML
5 INJECTION, SOLUTION SUBCUTANEOUS ONCE
Refills: 0 | Status: COMPLETED | OUTPATIENT
Start: 2020-05-23 | End: 2020-05-23

## 2020-05-23 RX ORDER — FENTANYL CITRATE 50 UG/ML
50 INJECTION INTRAVENOUS ONCE
Refills: 0 | Status: COMPLETED | OUTPATIENT
Start: 2020-05-23 | End: 2020-05-23

## 2020-05-23 RX ORDER — MEROPENEM 1 G/30ML
500 INJECTION INTRAVENOUS EVERY 12 HOURS
Refills: 0 | Status: DISCONTINUED | OUTPATIENT
Start: 2020-05-23 | End: 2020-05-23

## 2020-05-23 RX ORDER — FUROSEMIDE 40 MG
20 TABLET ORAL ONCE
Refills: 0 | Status: COMPLETED | OUTPATIENT
Start: 2020-05-23 | End: 2020-05-23

## 2020-05-23 RX ORDER — ALBUMIN HUMAN 25 %
250 VIAL (ML) INTRAVENOUS ONCE
Refills: 0 | Status: COMPLETED | OUTPATIENT
Start: 2020-05-23 | End: 2020-05-23

## 2020-05-23 RX ORDER — FENTANYL CITRATE 50 UG/ML
0.5 INJECTION INTRAVENOUS
Qty: 5000 | Refills: 0 | Status: DISCONTINUED | OUTPATIENT
Start: 2020-05-23 | End: 2020-05-23

## 2020-05-23 RX ORDER — DEXTROSE 50 % IN WATER 50 %
50 SYRINGE (ML) INTRAVENOUS ONCE
Refills: 0 | Status: COMPLETED | OUTPATIENT
Start: 2020-05-23 | End: 2020-05-23

## 2020-05-23 RX ORDER — CHLORHEXIDINE GLUCONATE 213 G/1000ML
15 SOLUTION TOPICAL EVERY 12 HOURS
Refills: 0 | Status: DISCONTINUED | OUTPATIENT
Start: 2020-05-23 | End: 2020-05-23

## 2020-05-23 RX ORDER — SODIUM BICARBONATE 1 MEQ/ML
0.14 SYRINGE (ML) INTRAVENOUS
Qty: 150 | Refills: 0 | Status: DISCONTINUED | OUTPATIENT
Start: 2020-05-23 | End: 2020-05-23

## 2020-05-23 RX ORDER — PROPOFOL 10 MG/ML
20 INJECTION, EMULSION INTRAVENOUS ONCE
Refills: 0 | Status: COMPLETED | OUTPATIENT
Start: 2020-05-23 | End: 2020-05-23

## 2020-05-23 RX ORDER — INSULIN HUMAN 100 [IU]/ML
5 INJECTION, SOLUTION SUBCUTANEOUS ONCE
Refills: 0 | Status: DISCONTINUED | OUTPATIENT
Start: 2020-05-23 | End: 2020-05-23

## 2020-05-23 RX ORDER — SODIUM BICARBONATE 1 MEQ/ML
50 SYRINGE (ML) INTRAVENOUS ONCE
Refills: 0 | Status: DISCONTINUED | OUTPATIENT
Start: 2020-05-23 | End: 2020-05-23

## 2020-05-23 RX ORDER — SODIUM CHLORIDE 9 MG/ML
1000 INJECTION INTRAMUSCULAR; INTRAVENOUS; SUBCUTANEOUS ONCE
Refills: 0 | Status: COMPLETED | OUTPATIENT
Start: 2020-05-23 | End: 2020-05-23

## 2020-05-23 RX ORDER — MIDAZOLAM HYDROCHLORIDE 1 MG/ML
4 INJECTION, SOLUTION INTRAMUSCULAR; INTRAVENOUS ONCE
Refills: 0 | Status: COMPLETED | OUTPATIENT
Start: 2020-05-23 | End: 2020-05-23

## 2020-05-23 RX ORDER — MEROPENEM 1 G/30ML
1000 INJECTION INTRAVENOUS EVERY 12 HOURS
Refills: 0 | Status: DISCONTINUED | OUTPATIENT
Start: 2020-05-23 | End: 2020-05-23

## 2020-05-23 RX ORDER — FENTANYL CITRATE 50 UG/ML
0.5 INJECTION INTRAVENOUS
Qty: 2500 | Refills: 0 | Status: DISCONTINUED | OUTPATIENT
Start: 2020-05-23 | End: 2020-05-23

## 2020-05-23 RX ADMIN — Medication 125 MILLIGRAM(S): at 00:58

## 2020-05-23 RX ADMIN — SIMETHICONE 80 MILLIGRAM(S): 80 TABLET, CHEWABLE ORAL at 00:23

## 2020-05-23 RX ADMIN — LIDOCAINE 1 PATCH: 4 CREAM TOPICAL at 10:28

## 2020-05-23 RX ADMIN — Medication 20 MILLIGRAM(S): at 10:35

## 2020-05-23 RX ADMIN — FENTANYL CITRATE 100 MICROGRAM(S): 50 INJECTION INTRAVENOUS at 10:29

## 2020-05-23 RX ADMIN — MIDAZOLAM HYDROCHLORIDE 2 MILLIGRAM(S): 1 INJECTION, SOLUTION INTRAMUSCULAR; INTRAVENOUS at 07:50

## 2020-05-23 RX ADMIN — MIDAZOLAM HYDROCHLORIDE 1 MILLIGRAM(S): 1 INJECTION, SOLUTION INTRAMUSCULAR; INTRAVENOUS at 07:49

## 2020-05-23 RX ADMIN — Medication 4 MILLIGRAM(S): at 00:22

## 2020-05-23 RX ADMIN — HYDROMORPHONE HYDROCHLORIDE 0.25 MILLIGRAM(S): 2 INJECTION INTRAMUSCULAR; INTRAVENOUS; SUBCUTANEOUS at 01:16

## 2020-05-23 RX ADMIN — INSULIN HUMAN 5 UNIT(S): 100 INJECTION, SOLUTION SUBCUTANEOUS at 10:35

## 2020-05-23 RX ADMIN — Medication 50 MILLILITER(S): at 10:34

## 2020-05-23 RX ADMIN — MIDAZOLAM HYDROCHLORIDE 4 MILLIGRAM(S): 1 INJECTION, SOLUTION INTRAMUSCULAR; INTRAVENOUS at 10:28

## 2020-05-23 RX ADMIN — Medication 50 MILLIEQUIVALENT(S): at 10:36

## 2020-05-23 RX ADMIN — FENTANYL CITRATE 50 MICROGRAM(S): 50 INJECTION INTRAVENOUS at 07:49

## 2020-05-23 RX ADMIN — PROPOFOL 20 MILLIGRAM(S): 10 INJECTION, EMULSION INTRAVENOUS at 10:29

## 2020-05-23 RX ADMIN — SODIUM CHLORIDE 1000 MILLILITER(S): 9 INJECTION INTRAMUSCULAR; INTRAVENOUS; SUBCUTANEOUS at 02:57

## 2020-05-23 RX ADMIN — Medication 50 MILLIEQUIVALENT(S): at 13:20

## 2020-05-23 RX ADMIN — CHLORHEXIDINE GLUCONATE 1 APPLICATION(S): 213 SOLUTION TOPICAL at 10:28

## 2020-05-23 RX ADMIN — CEFEPIME 100 MILLIGRAM(S): 1 INJECTION, POWDER, FOR SOLUTION INTRAMUSCULAR; INTRAVENOUS at 10:36

## 2020-05-23 RX ADMIN — HYDROMORPHONE HYDROCHLORIDE 0.5 MILLIGRAM(S): 2 INJECTION INTRAMUSCULAR; INTRAVENOUS; SUBCUTANEOUS at 07:48

## 2020-05-23 RX ADMIN — HYDROMORPHONE HYDROCHLORIDE 0.25 MILLIGRAM(S): 2 INJECTION INTRAMUSCULAR; INTRAVENOUS; SUBCUTANEOUS at 02:07

## 2020-05-23 RX ADMIN — FENTANYL CITRATE 100 MICROGRAM(S): 50 INJECTION INTRAVENOUS at 07:49

## 2020-05-23 NOTE — PROGRESS NOTE ADULT - SUBJECTIVE AND OBJECTIVE BOX
CHIEF COMPLAINT:    Interval Events:    REVIEW OF SYSTEMS:  Constitutional: [ ] negative [ ] fevers [ ] chills [ ] weight loss [ ] weight gain  HEENT: [ ] negative [ ] dry eyes [ ] eye irritation [ ] postnasal drip [ ] nasal congestion  CV: [ ] negative  [ ] chest pain [ ] orthopnea [ ] palpitations [ ] murmur  Resp: [ ] negative [ ] cough [ ] shortness of breath [ ] dyspnea [ ] wheezing [ ] sputum [ ] hemoptysis  GI: [ ] negative [ ] nausea [ ] vomiting [ ] diarrhea [ ] constipation [ ] abd pain [ ] dysphagia   : [ ] negative [ ] dysuria [ ] nocturia [ ] hematuria [ ] increased urinary frequency  Musculoskeletal: [ ] negative [ ] back pain [ ] myalgias [ ] arthralgias [ ] fracture  Skin: [ ] negative [ ] rash [ ] itch  Neurological: [ ] negative [ ] headache [ ] dizziness [ ] syncope [ ] weakness [ ] numbness  Psychiatric: [ ] negative [ ] anxiety [ ] depression  Endocrine: [ ] negative [ ] diabetes [ ] thyroid problem  Hematologic/Lymphatic: [ ] negative [ ] anemia [ ] bleeding problem  Allergic/Immunologic: [ ] negative [ ] itchy eyes [ ] nasal discharge [ ] hives [ ] angioedema  [ ] All other systems negative  [ ] Unable to assess ROS because ________    OBJECTIVE:  ICU Vital Signs Last 24 Hrs  T(C): 36.6 (23 May 2020 04:15), Max: 38.4 (22 May 2020 10:45)  T(F): 97.9 (23 May 2020 04:15), Max: 101.1 (22 May 2020 10:45)  HR: 123 (23 May 2020 06:08) (78 - 132)  BP: 64/29 (23 May 2020 06:08) (64/29 - 134/83)  BP(mean): 41 (23 May 2020 06:08) (41 - 41)  ABP: --  ABP(mean): --  RR: 38 (23 May 2020 06:08) (15 - 38)  SpO2: 91% (23 May 2020 06:08) (91% - 100%)        05-21 @ 07:01  -  05-22 @ 07:00  --------------------------------------------------------  IN: 2497.9 mL / OUT: 3000 mL / NET: -502.1 mL     @ 07:01   @ 06:43  --------------------------------------------------------  IN: 4530 mL / OUT: 300 mL / NET: 4230 mL      CAPILLARY BLOOD GLUCOSE      POCT Blood Glucose.: 164 mg/dL (22 May 2020 12:12)      PHYSICAL EXAM:  General:   HEENT:   Lymph Nodes:  Neck:   Respiratory:   Cardiovascular:   Abdomen:   Extremities:   Skin:   Neurological:  Psychiatry:    LINES:    HOSPITAL MEDICATIONS:  Standing Meds:  albumin human  5% IVPB 250 milliLiter(s) IV Intermittent once  allopurinol 300 milliGRAM(s) Oral daily  caspofungin IVPB      caspofungin IVPB 50 milliGRAM(s) IV Intermittent every 24 hours  cefepime   IVPB 2000 milliGRAM(s) IV Intermittent every 8 hours  cefepime   IVPB      chlorhexidine 4% Liquid 1 Application(s) Topical <User Schedule>  chlorhexidine 4% Liquid 1 Application(s) Topical <User Schedule>  cytarabine PF IntraThecal (eMAR) 50 milliGRAM(s) IntraThecal once  dexAMETHasone  Injectable 4 milliGRAM(s) IV Push every 6 hours  imatinib 400 milliGRAM(s) Oral two times a day  lactulose Syrup 10 Gram(s) Oral every 8 hours  levETIRAcetam 500 milliGRAM(s) Oral two times a day  lidocaine   Patch 1 Patch Transdermal every 24 hours  melatonin 5 milliGRAM(s) Oral at bedtime  methotrexate PF IntraThecal (eMAR) 12 milliGRAM(s) IntraThecal once  methotrexate PF IntraThecal (eMAR) 12 milliGRAM(s) IntraThecal once  pantoprazole    Tablet 40 milliGRAM(s) Oral before breakfast  polyethylene glycol 3350 17 Gram(s) Oral every 12 hours  senna 2 Tablet(s) Oral every 12 hours  simethicone 80 milliGRAM(s) Chew every 6 hours  sodium bicarbonate  Infusion 0.144 mEq/kG/Hr IV Continuous <Continuous>  sodium bicarbonate  Injectable 50 milliEquivalent(s) IV Push once  sodium chloride 0.9%. 500 milliLiter(s) IV Continuous <Continuous>  ursodiol Capsule 300 milliGRAM(s) Oral every 12 hours  vancomycin  IVPB 1000 milliGRAM(s) IV Intermittent every 12 hours      PRN Meds:  methyl salicylate 15%/menthol 10% Topical Cream 1 Application(s) Topical three times a day PRN  naloxone Injectable 0.1 milliGRAM(s) IV Push every 3 minutes PRN  ondansetron Injectable 8 milliGRAM(s) IV Push every 8 hours PRN  sodium chloride 0.9% lock flush 10 milliLiter(s) IV Push every 1 hour PRN      LABS:                        7.5    <0.10 )-----------( 16       ( 23 May 2020 01:29 )             23.5     Hgb Trend: 7.5<--, 6.0<--, 7.2<--, 6.8<--, 7.9<--  05-22    135  |  106  |  30<H>  ----------------------------<  194<H>  4.5   |  17<L>  |  0.86    Ca    9.2      22 May 2020 06:58  Phos  3.1       Mg     1.9         TPro  5.5<L>  /  Alb  2.9<L>  /  TBili  1.2  /  DBili  0.7<H>  /  AST  65<H>  /  ALT  153<H>  /  AlkPhos  71  22    Creatinine Trend: 0.86<--, 0.85<--, 0.84<--, 0.80<--, 0.96<--, 0.95<--  PT/INR - ( 21 May 2020 09:38 )   PT: 10.5 sec;   INR: 0.92 ratio         PTT - ( 21 May 2020 09:38 )  PTT:21.6 sec  Urinalysis Basic - ( 23 May 2020 04:35 )    Color: Lilia / Appearance: Slightly Turbid / S.025 / pH: x  Gluc: x / Ketone: Trace  / Bili: Negative / Urobili: <2 mg/dL   Blood: x / Protein: 30 mg/dL / Nitrite: Negative   Leuk Esterase: Negative / RBC: 0 /HPF / WBC 5 /HPF   Sq Epi: x / Non Sq Epi: 6 /HPF / Bacteria: Negative      Arterial Blood Gas:   @ 05:09  7.06/29/163/8/98/-20.8  ABG lactate: --    Venous Blood Gas:   @ 12:37  7.37/28/45/16/78  VBG Lactate: 5.2      MICROBIOLOGY:     Culture - Blood (collected 22 May 2020 14:58)  Source: .Blood Blood-Catheter  Gram Stain (23 May 2020 00:49):    Growth in aerobic bottle: Gram Negative Rods    Growth in anaerobic bottle: Gram Negative Rods  Preliminary Report (23 May 2020 00:49):    Growth in aerobic bottle: Gram Negative Rods    Growth in anaerobic bottle: Gram Negative Rods    "Due to technical problems, Proteus sp. will Not be reported as part of    the BCID panel until further notice"    ***Blood Panel PCR results on this specimenare available    approximately 3 hours after the Gram stain result.***    Gram stain, PCR, and/or culture results may not always    correspond due to difference in methodologies.    ************************************************************    This PCR assaywas performed using [employer].    The following targets are tested for: Enterococcus,    vancomycin resistant enterococci, Listeria monocytogenes,    coagulase negative staphylococci, S. aureus,    methicillin resistant S. aureus, Streptococcus agalactiae    (Group B), S. pneumoniae, S. pyogenes (Group A),    Acinetobacter baumannii, Enterobacter cloacae, E. coli,    Klebsiella oxytoca, K. pneumoniae, Proteus sp.,    Serratia marcescens, Haemophilus influenzae,    Neisseria meningitidis, Pseudomonas aeruginosa, Candida    albicans, C. glabrata, C krusei, C parapsilosis,    C. tropicalis and the KPC resistance gene.  Organism: Blood Culture PCR (23 May 2020 00:12)  Organism: Blood Culture PCR (23 May 2020 00:12)      RADIOLOGY:  [ ] Reviewed and interpreted by me    EKG:

## 2020-05-23 NOTE — PROGRESS NOTE ADULT - ASSESSMENT
Ms Galeas is a 60y F with pmhx of Ph+ ALL  s/p halpo-identical SCT in 2019 and chronic arthritic back pain initially presented on May 12th with acute on chronic low back pain and profound b/l LE weakness, found with leucocytosis and pancytopenia concerning for relapse ALL, admitted to the floors for further evaluation and management. Course complicated by gram negative bacteremia and septic shock. Transferred to MICU for hypotension requiring pressors and hypoxemia on bipap.        #Neuro  A&O x3  Asses mental status     #Cardiovascular  On levo 0.3   Maintain MAP >65  250 cc albumin bolus    #Respiratory  Last ABG with reassuring ABG  Supplement O2 as needed.     #GI/Nutrition  Zofran 8mg q8hr  NPO   Prophylactic Protonix    #/Renal  Monitor UO and electrolytes  Monitor renal function given risk of tumor lysis syndrome    #ID   On Cefepime, Capsofungin   COVID neg x2  Follow BC, UC    #Endocrine  ISS    #Hematologic/DVT ppx  ALL; pancytopenic on Gleevec, Ommeya, intrathecal methrotrexate, cytarabbien and Gleevec  Holding anticoagulation given thrombocytopenia  Will transfuse 1pRBC and 1 unit of platelets.   Will labs to evaluate for tumor lysis sydnrome    #Ethics.

## 2020-05-23 NOTE — CHART NOTE - NSCHARTNOTEFT_GEN_A_CORE
Thoroughly reviewed risks and benefits of RRT/HD with patient's  daughter in law, Germaine Costello, . Patient's  daughter in law, agrees to dialytic therapy if needed. All questions answered. Will plan on initiating CRRT/CVVHD today.

## 2020-05-23 NOTE — PROVIDER CONTACT NOTE (CRITICAL VALUE NOTIFICATION) - BACKGROUND
LEUKOCYTOSIS
Fever
Fever
S/p chemo
intubated
post Haplo tx for AAL relapsed
pt admitted 5/12 with Bilat LE weakness and leukocytosis
relapse ALL, leg weakness, back pain
relapse ALL, s/p 2 SCT
s/p chemo

## 2020-05-23 NOTE — CHART NOTE - NSCHARTNOTEFT_GEN_A_CORE
Spoke to hem/onc fellow; notified of RRT , Pt's change of status, and transfer to ICU.  Call placed to emergency contact Germaine Costello and son Ponce gutierrez.  No answer at either number; awaiting call backs.  F/u with primary team in am.    Balbina Erickson, ANP ext [phone] Spoke to hem/onc fellow; notified of RRT , Pt's change of status, and transfer to ICU.  Call placed to emergency contact Germaine Costello [phone] and son Ponce Galeas [phone].  No answer at either number; awaiting call backs.  F/u with primary team in am.    Balbina Erickson, ANP ext [phone]

## 2020-05-23 NOTE — CONSULT NOTE ADULT - SUBJECTIVE AND OBJECTIVE BOX
[facility] DIVISION OF KIDNEY DISEASES AND HYPERTENSION -- INITIAL CONSULT NOTE  --------------------------------------------------------------------------------  HPI:    60F hx ALL s/p chemo, allogenic SCT (2016) in remission, SDH, chronic back pain who presented from [facility] for concern of pt's weakness. Found to have lower back pain and UE and LE tingling. Workup with MRI showed enhancing T2 prolongation and cauda equina, suspicious for relapse of disease. Pt had bone marrow biopsy on 5/15, which confirmed as well as an LP, which showed CSF consistent with B-lymphoblastic leukemia. Pt given imatinib, pt developed septic shock therefore was transferred to to MICU, pt started on IV vasopressors, developed hyperkalemia, renal failure and acidosis and nephrology consulted. Nephrology evaluated pt, planned to start CRRT, however pt .     PAST HISTORY  --------------------------------------------------------------------------------  PAST MEDICAL & SURGICAL HISTORY:  Herpes zoster  Leukemia  Clostridium difficile diarrhea  Intractable migraine with status migrainosus, unspecified migraine type  Sciatica of right side  Chronic gastritis, presence of bleeding unspecified, unspecified gastritis type  Essential hypertension  Lipoma: left side  Elective surgical procedure: ommaya placement  History of  delivery    FAMILY HISTORY:  No pertinent family history in first degree relatives    PAST SOCIAL HISTORY:    ALLERGIES & MEDICATIONS  --------------------------------------------------------------------------------  Allergies    No Known Drug Allergies  shellfish (Nausea; Vomiting)    Intolerances      Standing Inpatient Medications  allopurinol 300 milliGRAM(s) Oral daily  caspofungin IVPB      caspofungin IVPB 50 milliGRAM(s) IV Intermittent every 24 hours  chlorhexidine 0.12% Liquid 15 milliLiter(s) Oral Mucosa every 12 hours  chlorhexidine 4% Liquid 1 Application(s) Topical <User Schedule>  chlorhexidine 4% Liquid 1 Application(s) Topical <User Schedule>  CRRT Treatment    <Continuous>  cytarabine PF IntraThecal (eMAR) 50 milliGRAM(s) IntraThecal once  dexAMETHasone  Injectable 4 milliGRAM(s) IV Push every 6 hours  fentaNYL    Injectable 50 MICROGram(s) IV Push once  fentaNYL   Infusion... 0.5 MICROgram(s)/kG/Hr IV Continuous <Continuous>  lactulose Syrup 10 Gram(s) Oral every 8 hours  levETIRAcetam  IVPB 500 milliGRAM(s) IV Intermittent every 12 hours  lidocaine   Patch 1 Patch Transdermal every 24 hours  melatonin 5 milliGRAM(s) Oral at bedtime  meropenem  IVPB 1000 milliGRAM(s) IV Intermittent every 12 hours  methotrexate PF IntraThecal (eMAR) 12 milliGRAM(s) IntraThecal once  methotrexate PF IntraThecal (eMAR) 12 milliGRAM(s) IntraThecal once  midazolam Injectable 4 milliGRAM(s) IV Push once  norepinephrine Infusion 0.05 MICROgram(s)/kG/Min IV Continuous <Continuous>  norepinephrine Infusion 0.05 MICROgram(s)/kG/Min IV Continuous <Continuous>  pantoprazole  Injectable 40 milliGRAM(s) IV Push daily  polyethylene glycol 3350 17 Gram(s) Oral every 12 hours  PrismaSOL Filtration BGK 0 / 2.5 5000 milliLiter(s) CRRT <Continuous>  propofol Infusion 10 MICROgram(s)/kG/Min IV Continuous <Continuous>  senna 2 Tablet(s) Oral every 12 hours  simethicone 80 milliGRAM(s) Chew every 6 hours  sodium bicarbonate  Infusion 0.144 mEq/kG/Hr IV Continuous <Continuous>  sodium bicarbonate  Injectable 50 milliEquivalent(s) IV Push once  ursodiol Capsule 300 milliGRAM(s) Oral every 12 hours  vancomycin  IVPB 1000 milliGRAM(s) IV Intermittent every 12 hours  vasopressin Infusion 0.04 Unit(s)/Min IV Continuous <Continuous>    PRN Inpatient Medications  methyl salicylate 15%/menthol 10% Topical Cream 1 Application(s) Topical three times a day PRN  naloxone Injectable 0.1 milliGRAM(s) IV Push every 3 minutes PRN  ondansetron Injectable 8 milliGRAM(s) IV Push every 8 hours PRN  sodium chloride 0.9% lock flush 10 milliLiter(s) IV Push every 1 hour PRN  sodium chloride 0.9% lock flush 10 milliLiter(s) IV Push every 1 hour PRN      REVIEW OF SYSTEMS  --------------------------------------------------------------------------------  Unable to obtain      VITALS/PHYSICAL EXAM  --------------------------------------------------------------------------------  T(C): 36.6 (20 @ 04:15), Max: 37.7 (20 @ 01:56)  HR: 90 (20 @ 09:57) (78 - 128)  BP: 64/29 (20 @ 06:08) (64/29 - 134/83)  RR: 38 (20 @ 06:08) (15 - 38)  SpO2: 91% (20 @ 06:08) (91% - 97%)  Wt(kg): --        20 @ 07:01  -  20 @ 07:00  --------------------------------------------------------  IN: 4530 mL / OUT: 300 mL / NET: 4230 mL      Physical Exam:  pt .     LABS/STUDIES  --------------------------------------------------------------------------------              8.3    <0.10 >-----------<  64       [20 07:41]              26.5     134  |  104  |  62  ----------------------------<  67      [20 11:58]  7.4   |  <10  |  2.21        Ca     7.0     [20:58]      Mg     2.2     [20:41]      Phos  10.9     [20 07:41]    TPro  5.8  /  Alb  2.6  /  TBili  0.8  /  DBili  x   /  AST  425  /  ALT  502  /  AlkPhos  49  [20 07:41]    PT/INR: PT 19.7 , INR 1.69       [20:41]  PTT: 27.8       [20 07:41]    Uric acid 5.6      [20:41]        [20 07:41]        [20:58]    Creatinine Trend:  SCr 2.21 [:58]  SCr 2.02 [:41]  SCr 0.86 [:58]  SCr 0.85 [ 09:38]  SCr 0.84 [ 09:16]    Urinalysis - [20 04:35]      Color Lilia / Appearance Slightly Turbid / SG 1.025 / pH 6.0      Gluc Negative / Ketone Trace  / Bili Negative / Urobili <2 mg/dL       Blood Negative / Protein 30 mg/dL / Leuk Est Negative / Nitrite Negative      RBC 0 / WBC 5 / Hyaline 1 / Gran  / Sq Epi  / Non Sq Epi 6 / Bacteria Negative      HbA1c 5.6      [10-05-18 @ 14:08]  TSH 2.17      [20 @ 13:22]    HBsAg Nonreact      [10-05-18 @ 10:18]  HCV 0.08, Nonreact      [19 @ 21:53]  HIV Nonreact      [10-05-18 @ 10:18]    Free Light Chains: kappa 0.36, lambda 0.61, ratio = 0.59      [ @ 21:53]

## 2020-05-23 NOTE — PROGRESS NOTE ADULT - ATTENDING COMMENTS
60y F pmhx of Ph+ ALL, hx of SDH, s/p haploSCT 2/13/19, with chronic arthritic back pain p/w acute on chronic low back pain and profound b/l LE weakness, found with leukocytosis and cytopenia, BM bx 5/15 c/w relapse ALL, admitted for eval and treatment  CNS also + for leukemia    -febrile today -will start Cefepime, Caspofungin (pt neutropenic), f/u Cx's  -cont high dose Gleevec 400mg twice daily. Cont IV fluids  -cont Allopurinol for tumor lysis, wbc now low  -pt has Ommaya s/p IT Mtx given on 5/15 -showed 19% 'other' cells likely due to CSF involvement, flow cytometry showed 84% blasts. Plan 2x/wk IT chemo, on Tue/Friday's. Given Cytarabine 50mg IT on 5/19,  flow cytometry showed 40% c/w improvement. Getting Ommaya tap today  -BM bx done on 5/15 -showed 54% blasts  -f/u mutation analysis including T315I   -COVID19 neg on 5/12, 5/16 and weekly thereafter -will recheck today  -palliative care consult with pain control  -OOB with pt (pt has peripheral neuropathy s/p multiple falls, needs assistance walking) 60y F pmhx of Ph+ ALL, hx of SDH, s/p haploSCT 2/13/19, with chronic arthritic back pain p/w acute on chronic low back pain and profound b/l LE weakness, found with leukocytosis and cytopenia, BM bx 5/15 c/w relapse ALL, admitted for eval and treatment  CNS also + for leukemia    -febrile neutropenia -was started on Cefepime, Vanco, Caspofungin (pt neutropenic), f/u Cx's  -cont high dose Gleevec 400mg twice daily, on Decadron. Cont IV fluids  -cont Allopurinol for tumor lysis, wbc now low  -pt has Ommaya s/p IT Mtx given on 5/15 -showed 19% 'other' cells likely due to CSF involvement, flow cytometry showed 84% blasts. Plan 2x/wk IT chemo, on Tue/Friday's. Given Cytarabine 50mg IT on 5/19,  flow cytometry showed 40% c/w improvement. Getting Ommaya tap today  -BM bx done on 5/15 -showed 54% blasts  -f/u mutation analysis including T315I   -COVID19 neg on 5/12, 5/16 and weekly thereafter -will recheck today  -palliative care consult with pain control  -OOB with pt (pt has peripheral neuropathy s/p multiple falls, needs assistance walking)  - Patient had neutropenic fevers, started on broad spectrum IV ATB's, antifungal med. Now with septic shock with metabolic acidosis, NERI, hyperkalemia, worsening transaminitis transferred to MICU, required intubation. BC- positive for E. Coli.   Pressor support, cont vent support.   Shiley catheter placement today.   Monitor CBC with diff, CMP, LDH, Mg, phosphorus, coags.  Hold Gleevac at present as can not be given via NGT

## 2020-05-23 NOTE — PROGRESS NOTE ADULT - SUBJECTIVE AND OBJECTIVE BOX
CHIEF COMPLAINT:    Interval Events:    REVIEW OF SYSTEMS:  Constitutional: [ ] negative [ ] fevers [ ] chills [ ] weight loss [ ] weight gain  HEENT: [ ] negative [ ] dry eyes [ ] eye irritation [ ] postnasal drip [ ] nasal congestion  CV: [ ] negative  [ ] chest pain [ ] orthopnea [ ] palpitations [ ] murmur  Resp: [ ] negative [ ] cough [ ] shortness of breath [ ] dyspnea [ ] wheezing [ ] sputum [ ] hemoptysis  GI: [ ] negative [ ] nausea [ ] vomiting [ ] diarrhea [ ] constipation [ ] abd pain [ ] dysphagia   : [ ] negative [ ] dysuria [ ] nocturia [ ] hematuria [ ] increased urinary frequency  Musculoskeletal: [ ] negative [ ] back pain [ ] myalgias [ ] arthralgias [ ] fracture  Skin: [ ] negative [ ] rash [ ] itch  Neurological: [ ] negative [ ] headache [ ] dizziness [ ] syncope [ ] weakness [ ] numbness  Psychiatric: [ ] negative [ ] anxiety [ ] depression  Endocrine: [ ] negative [ ] diabetes [ ] thyroid problem  Hematologic/Lymphatic: [ ] negative [ ] anemia [ ] bleeding problem  Allergic/Immunologic: [ ] negative [ ] itchy eyes [ ] nasal discharge [ ] hives [ ] angioedema  [ ] All other systems negative  [ ] Unable to assess ROS because ________    OBJECTIVE:  ICU Vital Signs Last 24 Hrs  T(C): 36.6 (23 May 2020 04:15), Max: 38.4 (22 May 2020 10:45)  T(F): 97.9 (23 May 2020 04:15), Max: 101.1 (22 May 2020 10:45)  HR: 123 (23 May 2020 06:08) (78 - 132)  BP: 64/29 (23 May 2020 06:08) (64/29 - 134/83)  BP(mean): 41 (23 May 2020 06:08) (41 - 41)  ABP: --  ABP(mean): --  RR: 38 (23 May 2020 06:08) (15 - 38)  SpO2: 91% (23 May 2020 06:08) (91% - 100%)        05-22 @ 07:01  -  05-23 @ 07:00  --------------------------------------------------------  IN: 4530 mL / OUT: 300 mL / NET: 4230 mL      CAPILLARY BLOOD GLUCOSE      POCT Blood Glucose.: 164 mg/dL (22 May 2020 12:12)      PHYSICAL EXAM:  General:   HEENT:   Lymph Nodes:  Neck:   Respiratory:   Cardiovascular:   Abdomen:   Extremities:   Skin:   Neurological:  Psychiatry:    LINES:    HOSPITAL MEDICATIONS:    caspofungin IVPB      caspofungin IVPB 50 milliGRAM(s) IV Intermittent every 24 hours  cefepime   IVPB 2000 milliGRAM(s) IV Intermittent every 8 hours  cefepime   IVPB      vancomycin  IVPB 1000 milliGRAM(s) IV Intermittent every 12 hours      allopurinol 300 milliGRAM(s) Oral daily  dexAMETHasone  Injectable 4 milliGRAM(s) IV Push every 6 hours      HYDROmorphone  Injectable 0.5 milliGRAM(s) IV Push once  levETIRAcetam 500 milliGRAM(s) Oral two times a day  melatonin 5 milliGRAM(s) Oral at bedtime  ondansetron Injectable 8 milliGRAM(s) IV Push every 8 hours PRN    lactulose Syrup 10 Gram(s) Oral every 8 hours  pantoprazole    Tablet 40 milliGRAM(s) Oral before breakfast  polyethylene glycol 3350 17 Gram(s) Oral every 12 hours  senna 2 Tablet(s) Oral every 12 hours  simethicone 80 milliGRAM(s) Chew every 6 hours  ursodiol Capsule 300 milliGRAM(s) Oral every 12 hours    cytarabine PF IntraThecal (eMAR) 50 milliGRAM(s) IntraThecal once  imatinib 400 milliGRAM(s) Oral two times a day  methotrexate PF IntraThecal (eMAR) 12 milliGRAM(s) IntraThecal once  methotrexate PF IntraThecal (eMAR) 12 milliGRAM(s) IntraThecal once      albumin human  5% IVPB 250 milliLiter(s) IV Intermittent once  sodium bicarbonate  Infusion 0.144 mEq/kG/Hr IV Continuous <Continuous>  sodium bicarbonate  Injectable 50 milliEquivalent(s) IV Push once  sodium chloride 0.9% lock flush 10 milliLiter(s) IV Push every 1 hour PRN  sodium chloride 0.9%. 500 milliLiter(s) IV Continuous <Continuous>      chlorhexidine 4% Liquid 1 Application(s) Topical <User Schedule>  chlorhexidine 4% Liquid 1 Application(s) Topical <User Schedule>  lidocaine   Patch 1 Patch Transdermal every 24 hours  methyl salicylate 15%/menthol 10% Topical Cream 1 Application(s) Topical three times a day PRN    naloxone Injectable 0.1 milliGRAM(s) IV Push every 3 minutes PRN      LABS:                        7.5    <0.10 )-----------( 16       ( 23 May 2020 01:29 )             23.5     Hgb Trend: 7.5<--, 6.0<--, 7.2<--, 6.8<--, 7.9<--  0522    135  |  106  |  30<H>  ----------------------------<  194<H>  4.5   |  17<L>  |  0.86    Ca    9.2      22 May 2020 06:58  Phos  3.1       Mg     1.9         TPro  5.5<L>  /  Alb  2.9<L>  /  TBili  1.2  /  DBili  0.7<H>  /  AST  65<H>  /  ALT  153<H>  /  AlkPhos  71      Creatinine Trend: 0.86<--, 0.85<--, 0.84<--, 0.80<--, 0.96<--, 0.95<--  PT/INR - ( 21 May 2020 09:38 )   PT: 10.5 sec;   INR: 0.92 ratio         PTT - ( 21 May 2020 09:38 )  PTT:21.6 sec  Urinalysis Basic - ( 23 May 2020 04:35 )    Color: Lilia / Appearance: Slightly Turbid / S.025 / pH: x  Gluc: x / Ketone: Trace  / Bili: Negative / Urobili: <2 mg/dL   Blood: x / Protein: 30 mg/dL / Nitrite: Negative   Leuk Esterase: Negative / RBC: 0 /HPF / WBC 5 /HPF   Sq Epi: x / Non Sq Epi: 6 /HPF / Bacteria: Negative      Arterial Blood Gas:   @ 05:09  7.06/29/163/8/98/-20.8  ABG lactate: --    Venous Blood Gas:   @ 07:08  7.10/38/67/11/86  VBG Lactate: 5.5  Venous Blood Gas:  05-22 @ 12:37  7.37///  VBG Lactate: 5.2      MICROBIOLOGY:     RADIOLOGY:  [ ] Reviewed and interpreted by me    EKG: CHIEF COMPLAINT: numbness and weakness    Interval Events:  Pt found to have worsening renal function, resulting in hyperkalemia. Given temporizing measures, bicarb gtt. Evaluated by renal team, mirna placed for CRRT. Platelets given prior to line insertion.   Around 1:32pm, pt was found to have wide complex rhythm on monitor, then became pulseless. CPR was initiated. K 7.4    REVIEW OF SYSTEMS:  Constitutional: [ ] negative [ ] fevers [ ] chills [ ] weight loss [ ] weight gain  HEENT: [ ] negative [ ] dry eyes [ ] eye irritation [ ] postnasal drip [ ] nasal congestion  CV: [ ] negative  [ ] chest pain [ ] orthopnea [ ] palpitations [ ] murmur  Resp: [ ] negative [ ] cough [ ] shortness of breath [ ] dyspnea [ ] wheezing [ ] sputum [ ] hemoptysis  GI: [ ] negative [ ] nausea [ ] vomiting [ ] diarrhea [ ] constipation [ ] abd pain [ ] dysphagia   : [ ] negative [ ] dysuria [ ] nocturia [ ] hematuria [ ] increased urinary frequency  Musculoskeletal: [ ] negative [ ] back pain [ ] myalgias [ ] arthralgias [ ] fracture  Skin: [ ] negative [ ] rash [ ] itch  Neurological: [ ] negative [ ] headache [ ] dizziness [ ] syncope [ ] weakness [ ] numbness  Psychiatric: [ ] negative [ ] anxiety [ ] depression  Endocrine: [ ] negative [ ] diabetes [ ] thyroid problem  Hematologic/Lymphatic: [ ] negative [ ] anemia [ ] bleeding problem  Allergic/Immunologic: [ ] negative [ ] itchy eyes [ ] nasal discharge [ ] hives [ ] angioedema  [ ] All other systems negative  [x ] Unable to assess ROS because AMS/sedation    OBJECTIVE:  ICU Vital Signs Last 24 Hrs  T(C): 36.6 (23 May 2020 04:15), Max: 38.4 (22 May 2020 10:45)  T(F): 97.9 (23 May 2020 04:15), Max: 101.1 (22 May 2020 10:45)  HR: 123 (23 May 2020 06:08) (78 - 132)  BP: 64/29 (23 May 2020 06:08) (64/29 - 134/83)  BP(mean): 41 (23 May 2020 06:08) (41 - 41)  ABP: --  ABP(mean): --  RR: 38 (23 May 2020 06:08) (15 - 38)  SpO2: 91% (23 May 2020 06:08) (91% - 100%)         @ 07:01  -   @ 07:00  --------------------------------------------------------  IN: 4530 mL / OUT: 300 mL / NET: 4230 mL      CAPILLARY BLOOD GLUCOSE      POCT Blood Glucose.: 164 mg/dL (22 May 2020 12:12)      PHYSICAL EXAM:  General:   HEENT:   Lymph Nodes:  Neck:   Respiratory:   Cardiovascular:   Abdomen:   Extremities:   Skin:   Neurological:  Psychiatry:    LINES:    HOSPITAL MEDICATIONS:    caspofungin IVPB      caspofungin IVPB 50 milliGRAM(s) IV Intermittent every 24 hours  cefepime   IVPB 2000 milliGRAM(s) IV Intermittent every 8 hours  cefepime   IVPB      vancomycin  IVPB 1000 milliGRAM(s) IV Intermittent every 12 hours      allopurinol 300 milliGRAM(s) Oral daily  dexAMETHasone  Injectable 4 milliGRAM(s) IV Push every 6 hours      HYDROmorphone  Injectable 0.5 milliGRAM(s) IV Push once  levETIRAcetam 500 milliGRAM(s) Oral two times a day  melatonin 5 milliGRAM(s) Oral at bedtime  ondansetron Injectable 8 milliGRAM(s) IV Push every 8 hours PRN    lactulose Syrup 10 Gram(s) Oral every 8 hours  pantoprazole    Tablet 40 milliGRAM(s) Oral before breakfast  polyethylene glycol 3350 17 Gram(s) Oral every 12 hours  senna 2 Tablet(s) Oral every 12 hours  simethicone 80 milliGRAM(s) Chew every 6 hours  ursodiol Capsule 300 milliGRAM(s) Oral every 12 hours    cytarabine PF IntraThecal (eMAR) 50 milliGRAM(s) IntraThecal once  imatinib 400 milliGRAM(s) Oral two times a day  methotrexate PF IntraThecal (eMAR) 12 milliGRAM(s) IntraThecal once  methotrexate PF IntraThecal (eMAR) 12 milliGRAM(s) IntraThecal once      albumin human  5% IVPB 250 milliLiter(s) IV Intermittent once  sodium bicarbonate  Infusion 0.144 mEq/kG/Hr IV Continuous <Continuous>  sodium bicarbonate  Injectable 50 milliEquivalent(s) IV Push once  sodium chloride 0.9% lock flush 10 milliLiter(s) IV Push every 1 hour PRN  sodium chloride 0.9%. 500 milliLiter(s) IV Continuous <Continuous>      chlorhexidine 4% Liquid 1 Application(s) Topical <User Schedule>  chlorhexidine 4% Liquid 1 Application(s) Topical <User Schedule>  lidocaine   Patch 1 Patch Transdermal every 24 hours  methyl salicylate 15%/menthol 10% Topical Cream 1 Application(s) Topical three times a day PRN    naloxone Injectable 0.1 milliGRAM(s) IV Push every 3 minutes PRN      LABS:                        7.5    <0.10 )-----------( 16       ( 23 May 2020 01:29 )             23.5     Hgb Trend: 7.5<--, 6.0<--, 7.2<--, 6.8<--, 7.9<--  05-22    135  |  106  |  30<H>  ----------------------------<  194<H>  4.5   |  17<L>  |  0.86    Ca    9.2      22 May 2020 06:58  Phos  3.1       Mg     1.9         TPro  5.5<L>  /  Alb  2.9<L>  /  TBili  1.2  /  DBili  0.7<H>  /  AST  65<H>  /  ALT  153<H>  /  AlkPhos  71  22    Creatinine Trend: 0.86<--, 0.85<--, 0.84<--, 0.80<--, 0.96<--, 0.95<--  PT/INR - ( 21 May 2020 09:38 )   PT: 10.5 sec;   INR: 0.92 ratio         PTT - ( 21 May 2020 09:38 )  PTT:21.6 sec  Urinalysis Basic - ( 23 May 2020 04:35 )    Color: Lilia / Appearance: Slightly Turbid / S.025 / pH: x  Gluc: x / Ketone: Trace  / Bili: Negative / Urobili: <2 mg/dL   Blood: x / Protein: 30 mg/dL / Nitrite: Negative   Leuk Esterase: Negative / RBC: 0 /HPF / WBC 5 /HPF   Sq Epi: x / Non Sq Epi: 6 /HPF / Bacteria: Negative      Arterial Blood Gas:   @ 05:09  7.06/29/163/8/98/-20.8  ABG lactate: --    Venous Blood Gas:   @ 07:08  7.10/38/67/11/86  VBG Lactate: 5.5  Venous Blood Gas:   @ 12:37  7.37/28/45/16/78  VBG Lactate: 5.2      MICROBIOLOGY:     RADIOLOGY:  [ ] Reviewed and interpreted by me    EKG: CHIEF COMPLAINT: numbness and weakness    Interval Events:  Pt found to have worsening renal function, resulting in hyperkalemia. Given temporizing measures, bicarb gtt. Evaluated by renal team, shiley placed for CRRT (not yet started). Platelets given prior to line insertion.   Around 1:32pm, pt was found to have wide complex rhythm on monitor, then became pulseless. CPR was initiated. K 7.4, given insulin, additional bicarb pushes. Pt returned to asystole, CPR reattempted; however, unable to achieve sustained ROSC after second attempt. ETT noted to be blood-filled. Pt's daughter Duyen called and notify, will come to see pt.    REVIEW OF SYSTEMS:  Constitutional: [ ] negative [ ] fevers [ ] chills [ ] weight loss [ ] weight gain  HEENT: [ ] negative [ ] dry eyes [ ] eye irritation [ ] postnasal drip [ ] nasal congestion  CV: [ ] negative  [ ] chest pain [ ] orthopnea [ ] palpitations [ ] murmur  Resp: [ ] negative [ ] cough [ ] shortness of breath [ ] dyspnea [ ] wheezing [ ] sputum [ ] hemoptysis  GI: [ ] negative [ ] nausea [ ] vomiting [ ] diarrhea [ ] constipation [ ] abd pain [ ] dysphagia   : [ ] negative [ ] dysuria [ ] nocturia [ ] hematuria [ ] increased urinary frequency  Musculoskeletal: [ ] negative [ ] back pain [ ] myalgias [ ] arthralgias [ ] fracture  Skin: [ ] negative [ ] rash [ ] itch  Neurological: [ ] negative [ ] headache [ ] dizziness [ ] syncope [ ] weakness [ ] numbness  Psychiatric: [ ] negative [ ] anxiety [ ] depression  Endocrine: [ ] negative [ ] diabetes [ ] thyroid problem  Hematologic/Lymphatic: [ ] negative [ ] anemia [ ] bleeding problem  Allergic/Immunologic: [ ] negative [ ] itchy eyes [ ] nasal discharge [ ] hives [ ] angioedema  [ ] All other systems negative  [x ] Unable to assess ROS because AMS/sedation    OBJECTIVE:  ICU Vital Signs Last 24 Hrs  T(C): 36.6 (23 May 2020 04:15), Max: 38.4 (22 May 2020 10:45)  T(F): 97.9 (23 May 2020 04:15), Max: 101.1 (22 May 2020 10:45)  HR: 123 (23 May 2020 06:08) (78 - 132)  BP: 64/29 (23 May 2020 06:08) (64/29 - 134/83)  BP(mean): 41 (23 May 2020 06:08) (41 - 41)  ABP: --  ABP(mean): --  RR: 38 (23 May 2020 06:08) (15 - 38)  SpO2: 91% (23 May 2020 06:08) (91% - 100%)        - @ 07:01  -   @ 07:00  --------------------------------------------------------  IN: 4530 mL / OUT: 300 mL / NET: 4230 mL      CAPILLARY BLOOD GLUCOSE      POCT Blood Glucose.: 164 mg/dL (22 May 2020 12:12)      PHYSICAL EXAM:  Gen: AAOx0  Head: NCAT  HEENT: EOMI, oral mucosa moist, normal conjunctiva  Lung: CTAB, no respiratory distress, no wheezes/rhonchi/rales B/L, speaking in full sentences  CV: RRR, no murmurs, rubs or gallops  Abd: soft, NTND, no guarding, no CVA tenderness  MSK: no visible deformities  Neuro: No focal sensory or motor deficits, normal CN exam   Skin: Warm, well perfused, no rash  Psych: normal affect.    HOSPITAL MEDICATIONS:    caspofungin IVPB      caspofungin IVPB 50 milliGRAM(s) IV Intermittent every 24 hours  cefepime   IVPB 2000 milliGRAM(s) IV Intermittent every 8 hours  cefepime   IVPB      vancomycin  IVPB 1000 milliGRAM(s) IV Intermittent every 12 hours      allopurinol 300 milliGRAM(s) Oral daily  dexAMETHasone  Injectable 4 milliGRAM(s) IV Push every 6 hours      HYDROmorphone  Injectable 0.5 milliGRAM(s) IV Push once  levETIRAcetam 500 milliGRAM(s) Oral two times a day  melatonin 5 milliGRAM(s) Oral at bedtime  ondansetron Injectable 8 milliGRAM(s) IV Push every 8 hours PRN    lactulose Syrup 10 Gram(s) Oral every 8 hours  pantoprazole    Tablet 40 milliGRAM(s) Oral before breakfast  polyethylene glycol 3350 17 Gram(s) Oral every 12 hours  senna 2 Tablet(s) Oral every 12 hours  simethicone 80 milliGRAM(s) Chew every 6 hours  ursodiol Capsule 300 milliGRAM(s) Oral every 12 hours    cytarabine PF IntraThecal (eMAR) 50 milliGRAM(s) IntraThecal once  imatinib 400 milliGRAM(s) Oral two times a day  methotrexate PF IntraThecal (eMAR) 12 milliGRAM(s) IntraThecal once  methotrexate PF IntraThecal (eMAR) 12 milliGRAM(s) IntraThecal once      albumin human  5% IVPB 250 milliLiter(s) IV Intermittent once  sodium bicarbonate  Infusion 0.144 mEq/kG/Hr IV Continuous <Continuous>  sodium bicarbonate  Injectable 50 milliEquivalent(s) IV Push once  sodium chloride 0.9% lock flush 10 milliLiter(s) IV Push every 1 hour PRN  sodium chloride 0.9%. 500 milliLiter(s) IV Continuous <Continuous>      chlorhexidine 4% Liquid 1 Application(s) Topical <User Schedule>  chlorhexidine 4% Liquid 1 Application(s) Topical <User Schedule>  lidocaine   Patch 1 Patch Transdermal every 24 hours  methyl salicylate 15%/menthol 10% Topical Cream 1 Application(s) Topical three times a day PRN    naloxone Injectable 0.1 milliGRAM(s) IV Push every 3 minutes PRN      LABS:                        7.5    <0.10 )-----------( 16       ( 23 May 2020 01:29 )             23.5     Hgb Trend: 7.5<--, 6.0<--, 7.2<--, 6.8<--, 7.9<--      135  |  106  |  30<H>  ----------------------------<  194<H>  4.5   |  17<L>  |  0.86    Ca    9.2      22 May 2020 06:58  Phos  3.1       Mg     1.9         TPro  5.5<L>  /  Alb  2.9<L>  /  TBili  1.2  /  DBili  0.7<H>  /  AST  65<H>  /  ALT  153<H>  /  AlkPhos  71      Creatinine Trend: 0.86<--, 0.85<--, 0.84<--, 0.80<--, 0.96<--, 0.95<--  PT/INR - ( 21 May 2020 09:38 )   PT: 10.5 sec;   INR: 0.92 ratio         PTT - ( 21 May 2020 09:38 )  PTT:21.6 sec  Urinalysis Basic - ( 23 May 2020 04:35 )    Color: Lilia / Appearance: Slightly Turbid / S.025 / pH: x  Gluc: x / Ketone: Trace  / Bili: Negative / Urobili: <2 mg/dL   Blood: x / Protein: 30 mg/dL / Nitrite: Negative   Leuk Esterase: Negative / RBC: 0 /HPF / WBC 5 /HPF   Sq Epi: x / Non Sq Epi: 6 /HPF / Bacteria: Negative      Arterial Blood Gas:   @ 05:09  7.06/29/163/8/98/-20.8  ABG lactate: --    Venous Blood Gas:   @ 07:08  7.10/38/67/11/86  VBG Lactate: 5.5  Venous Blood Gas:   @ 12:37  7.37/28/45/16/78  VBG Lactate: 5.2      MICROBIOLOGY:     RADIOLOGY:  [ ] Reviewed and interpreted by me    EKG: CHIEF COMPLAINT: numbness and weakness    Interval Events:  Pt found to have worsening renal function, resulting in hyperkalemia. Given temporizing measures, bicarb gtt. Evaluated by renal team, shiley placed for CRRT (not yet started). Platelets given prior to line insertion.   Around 1:32pm, pt was found to have wide complex rhythm on monitor, then became pulseless. CPR was initiated. K 7.4, given insulin, additional bicarb pushes. Pt returned to asystole, CPR reattempted; however, unable to achieve sustained ROSC after second attempt. ETT noted to be blood-filled. Pt's daughter Duyen called and notify, will come to see pt.    REVIEW OF SYSTEMS:  Constitutional: [ ] negative [ ] fevers [ ] chills [ ] weight loss [ ] weight gain  HEENT: [ ] negative [ ] dry eyes [ ] eye irritation [ ] postnasal drip [ ] nasal congestion  CV: [ ] negative  [ ] chest pain [ ] orthopnea [ ] palpitations [ ] murmur  Resp: [ ] negative [ ] cough [ ] shortness of breath [ ] dyspnea [ ] wheezing [ ] sputum [ ] hemoptysis  GI: [ ] negative [ ] nausea [ ] vomiting [ ] diarrhea [ ] constipation [ ] abd pain [ ] dysphagia   : [ ] negative [ ] dysuria [ ] nocturia [ ] hematuria [ ] increased urinary frequency  Musculoskeletal: [ ] negative [ ] back pain [ ] myalgias [ ] arthralgias [ ] fracture  Skin: [ ] negative [ ] rash [ ] itch  Neurological: [ ] negative [ ] headache [ ] dizziness [ ] syncope [ ] weakness [ ] numbness  Psychiatric: [ ] negative [ ] anxiety [ ] depression  Endocrine: [ ] negative [ ] diabetes [ ] thyroid problem  Hematologic/Lymphatic: [ ] negative [ ] anemia [ ] bleeding problem  Allergic/Immunologic: [ ] negative [ ] itchy eyes [ ] nasal discharge [ ] hives [ ] angioedema  [ ] All other systems negative  [x ] Unable to assess ROS because AMS/sedation    OBJECTIVE:  ICU Vital Signs Last 24 Hrs  T(C): 36.6 (23 May 2020 04:15), Max: 38.4 (22 May 2020 10:45)  T(F): 97.9 (23 May 2020 04:15), Max: 101.1 (22 May 2020 10:45)  HR: 123 (23 May 2020 06:08) (78 - 132)  BP: 64/29 (23 May 2020 06:08) (64/29 - 134/83)  BP(mean): 41 (23 May 2020 06:08) (41 - 41)  ABP: --  ABP(mean): --  RR: 38 (23 May 2020 06:08) (15 - 38)  SpO2: 91% (23 May 2020 06:08) (91% - 100%)        - @ 07:01  -   @ 07:00  --------------------------------------------------------  IN: 4530 mL / OUT: 300 mL / NET: 4230 mL      CAPILLARY BLOOD GLUCOSE      POCT Blood Glucose.: 164 mg/dL (22 May 2020 12:12)      PHYSICAL EXAM:  Gen: AAOx0  Head: NCAT  HEENT: EOMI, oral mucosa moist, normal conjunctiva  Lung: CTAB, ventilated  CV: RRR  Abd: soft, distended  MSK: no visible deformities  Neuro: sedated  Skin: Warm, well perfused, no rash  Psych: normal affect.    HOSPITAL MEDICATIONS:    caspofungin IVPB      caspofungin IVPB 50 milliGRAM(s) IV Intermittent every 24 hours  cefepime   IVPB 2000 milliGRAM(s) IV Intermittent every 8 hours  cefepime   IVPB      vancomycin  IVPB 1000 milliGRAM(s) IV Intermittent every 12 hours      allopurinol 300 milliGRAM(s) Oral daily  dexAMETHasone  Injectable 4 milliGRAM(s) IV Push every 6 hours      HYDROmorphone  Injectable 0.5 milliGRAM(s) IV Push once  levETIRAcetam 500 milliGRAM(s) Oral two times a day  melatonin 5 milliGRAM(s) Oral at bedtime  ondansetron Injectable 8 milliGRAM(s) IV Push every 8 hours PRN    lactulose Syrup 10 Gram(s) Oral every 8 hours  pantoprazole    Tablet 40 milliGRAM(s) Oral before breakfast  polyethylene glycol 3350 17 Gram(s) Oral every 12 hours  senna 2 Tablet(s) Oral every 12 hours  simethicone 80 milliGRAM(s) Chew every 6 hours  ursodiol Capsule 300 milliGRAM(s) Oral every 12 hours    cytarabine PF IntraThecal (eMAR) 50 milliGRAM(s) IntraThecal once  imatinib 400 milliGRAM(s) Oral two times a day  methotrexate PF IntraThecal (eMAR) 12 milliGRAM(s) IntraThecal once  methotrexate PF IntraThecal (eMAR) 12 milliGRAM(s) IntraThecal once      albumin human  5% IVPB 250 milliLiter(s) IV Intermittent once  sodium bicarbonate  Infusion 0.144 mEq/kG/Hr IV Continuous <Continuous>  sodium bicarbonate  Injectable 50 milliEquivalent(s) IV Push once  sodium chloride 0.9% lock flush 10 milliLiter(s) IV Push every 1 hour PRN  sodium chloride 0.9%. 500 milliLiter(s) IV Continuous <Continuous>      chlorhexidine 4% Liquid 1 Application(s) Topical <User Schedule>  chlorhexidine 4% Liquid 1 Application(s) Topical <User Schedule>  lidocaine   Patch 1 Patch Transdermal every 24 hours  methyl salicylate 15%/menthol 10% Topical Cream 1 Application(s) Topical three times a day PRN    naloxone Injectable 0.1 milliGRAM(s) IV Push every 3 minutes PRN      LABS:                        7.5    <0.10 )-----------( 16       ( 23 May 2020 01:29 )             23.5     Hgb Trend: 7.5<--, 6.0<--, 7.2<--, 6.8<--, 7.9<--      135  |  106  |  30<H>  ----------------------------<  194<H>  4.5   |  17<L>  |  0.86    Ca    9.2      22 May 2020 06:58  Phos  3.1       Mg     1.9         TPro  5.5<L>  /  Alb  2.9<L>  /  TBili  1.2  /  DBili  0.7<H>  /  AST  65<H>  /  ALT  153<H>  /  AlkPhos  71      Creatinine Trend: 0.86<--, 0.85<--, 0.84<--, 0.80<--, 0.96<--, 0.95<--  PT/INR - ( 21 May 2020 09:38 )   PT: 10.5 sec;   INR: 0.92 ratio         PTT - ( 21 May 2020 09:38 )  PTT:21.6 sec  Urinalysis Basic - ( 23 May 2020 04:35 )    Color: Lilia / Appearance: Slightly Turbid / S.025 / pH: x  Gluc: x / Ketone: Trace  / Bili: Negative / Urobili: <2 mg/dL   Blood: x / Protein: 30 mg/dL / Nitrite: Negative   Leuk Esterase: Negative / RBC: 0 /HPF / WBC 5 /HPF   Sq Epi: x / Non Sq Epi: 6 /HPF / Bacteria: Negative      Arterial Blood Gas:   @ 05:09  7.06/29/163/8/98/-20.8  ABG lactate: --    Venous Blood Gas:   @ 07:08  7.10/38/67/11/86  VBG Lactate: 5.5  Venous Blood Gas:   @ 12:37  7.37/28/45/16/78  VBG Lactate: 5.2      MICROBIOLOGY:   Culture - Blood (20 @ 03:24)    Gram Stain:   Growth in aerobic bottle: Gram Negative Rods  Growth in anaerobic bottle: Gram Negative Rods    Specimen Source: .Blood Blood-Catheter    Culture Results:   Growth in aerobic bottle: Gram Negative Rods  Growth in anaerobic bottle: Gram Negative Rods        RADIOLOGY:  [x ] Reviewed and interpreted by me

## 2020-05-23 NOTE — PROCEDURE NOTE - NSINFORMCONSENT_GEN_A_CORE
Benefits, risks, and possible complications of procedure explained to patient/caregiver who verbalized understanding and gave written consent.
This was an emergent procedure.
Benefits, risks, and possible complications of procedure explained to patient/caregiver who verbalized understanding and gave written consent.
This was an emergent procedure.

## 2020-05-23 NOTE — CHART NOTE - NSCHARTNOTEFT_GEN_A_CORE
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 Course complicated by gram negative bacteremia and septic shock. Transferred to MICU for hypotension requiring pressors and hypoxemia on bipap.        PAST MEDICAL & SURGICAL HISTORY:  Herpes zoster  Leukemia  Clostridium difficile diarrhea  Intractable migraine with status migrainosus, unspecified migraine type  Sciatica of right side  Chronic gastritis, presence of bleeding unspecified, unspecified gastritis type  Essential hypertension  Lipoma: left side  Elective surgical procedure: ommaya placement  History of  delivery      FAMILY HISTORY:  No pertinent family history in first degree relatives      SOCIAL HISTORY:  Smoking: former smoker  Substance Use: No  EtOH Use: No    HOME MEDICATIONS:      Allergies    No Known Drug Allergies  shellfish (Nausea; Vomiting)    Intolerances          REVIEW OF SYSTEMS:    [ x] Unable to assess ROS because of critical medical condition.     OBJECTIVE:  ICU Vital Signs Last 24 Hrs  T(C): 36.6 (23 May 2020 04:15), Max: 38.4 (22 May 2020 10:45)  T(F): 97.9 (23 May 2020 04:15), Max: 101.1 (22 May 2020 10:45)  HR: 78 (23 May 2020 04:15) (78 - 132)  BP: 97/663 (23 May 2020 04:15) (83/55 - 134/83)  BP(mean): --  ABP: --  ABP(mean): --  RR: 15 (23 May 2020 04:15) (15 - 20)  SpO2: 95% (23 May 2020 01:56) (95% - 100%)        05-21 @ 07:01  -  -22 @ 07:00  --------------------------------------------------------  IN: 2497.9 mL / OUT: 3000 mL / NET: -502.1 mL     @ 07:01  -   @ 06:02  --------------------------------------------------------  IN: 4530 mL / OUT: 300 mL / NET: 4230 mL      CAPILLARY BLOOD GLUCOSE      POCT Blood Glucose.: 164 mg/dL (22 May 2020 12:12)      PHYSICAL EXAM:  Gen: AAOx3  Head: NCAT  HEENT: EOMI, oral mucosa moist, normal conjunctiva  Lung: CTAB, no respiratory distress, no wheezes/rhonchi/rales B/L, speaking in full sentences  CV: RRR, no murmurs, rubs or gallops  Abd: soft, NTND, no guarding, no CVA tenderness  MSK: no visible deformities  Neuro: No focal sensory or motor deficits, normal CN exam   Skin: Warm, well perfused, no rash  Psych: normal affect.      LINES:     HOSPITAL MEDICATIONS:  MEDICATIONS  (STANDING):  allopurinol 300 milliGRAM(s) Oral daily  caspofungin IVPB      caspofungin IVPB 50 milliGRAM(s) IV Intermittent every 24 hours  cefepime   IVPB 2000 milliGRAM(s) IV Intermittent every 8 hours  cefepime   IVPB      chlorhexidine 4% Liquid 1 Application(s) Topical <User Schedule>  cytarabine PF IntraThecal (eMAR) 50 milliGRAM(s) IntraThecal once  dexAMETHasone  Injectable 4 milliGRAM(s) IV Push every 6 hours  imatinib 400 milliGRAM(s) Oral two times a day  lactulose Syrup 10 Gram(s) Oral every 8 hours  levETIRAcetam 500 milliGRAM(s) Oral two times a day  lidocaine   Patch 1 Patch Transdermal every 24 hours  melatonin 5 milliGRAM(s) Oral at bedtime  methotrexate PF IntraThecal (eMAR) 12 milliGRAM(s) IntraThecal once  methotrexate PF IntraThecal (eMAR) 12 milliGRAM(s) IntraThecal once  pantoprazole    Tablet 40 milliGRAM(s) Oral before breakfast  polyethylene glycol 3350 17 Gram(s) Oral every 12 hours  senna 2 Tablet(s) Oral every 12 hours  simethicone 80 milliGRAM(s) Chew every 6 hours  sodium chloride 0.9%. 500 milliLiter(s) (75 mL/Hr) IV Continuous <Continuous>  ursodiol Capsule 300 milliGRAM(s) Oral every 12 hours  vancomycin  IVPB 1000 milliGRAM(s) IV Intermittent every 12 hours    MEDICATIONS  (PRN):  methyl salicylate 15%/menthol 10% Topical Cream 1 Application(s) Topical three times a day PRN Pain  naloxone Injectable 0.1 milliGRAM(s) IV Push every 3 minutes PRN for sedation or RR < 10  ondansetron Injectable 8 milliGRAM(s) IV Push every 8 hours PRN Nausea and/or Vomiting  sodium chloride 0.9% lock flush 10 milliLiter(s) IV Push every 1 hour PRN Pre/post blood products, medications, blood draw, and to maintain line patency      LABS:                        7.5    <0.10 )-----------( 16       ( 23 May 2020 01:29 )             23.5     Hgb Trend: 7.5<--, 6.0<--, 7.2<--, 6.8<--, 7.9<--  05-22    135  |  106  |  30<H>  ----------------------------<  194<H>  4.5   |  17<L>  |  0.86    Ca    9.2      22 May 2020 06:58  Phos  3.1       Mg     1.9     22    TPro  5.5<L>  /  Alb  2.9<L>  /  TBili  1.2  /  DBili  0.7<H>  /  AST  65<H>  /  ALT  153<H>  /  AlkPhos  71  -22    Creatinine Trend: 0.86<--, 0.85<--, 0.84<--, 0.80<--, 0.96<--, 0.95<--  PT/INR - ( 21 May 2020 09:38 )   PT: 10.5 sec;   INR: 0.92 ratio         PTT - ( 21 May 2020 09:38 )  PTT:21.6 sec  Urinalysis Basic - ( 23 May 2020 04:35 )    Color: Lilia / Appearance: Slightly Turbid / S.025 / pH: x  Gluc: x / Ketone: Trace  / Bili: Negative / Urobili: <2 mg/dL   Blood: x / Protein: 30 mg/dL / Nitrite: Negative   Leuk Esterase: Negative / RBC: 0 /HPF / WBC 5 /HPF   Sq Epi: x / Non Sq Epi: 6 /HPF / Bacteria: Negative      Arterial Blood Gas:   @ 05:09  7.06/29/163/8/98/-20.8  ABG lactate: --    Venous Blood Gas:   @ 12:37  7.37/28/45/16/78  VBG Lactate: 5.2      MICROBIOLOGY:     RADIOLOGY & ADDITIONAL TESTS:      ASSESSMENT AND PLAN:  Ms Galeas is a 60y F with pmhx of Ph+ ALL  s/p halpo-identical SCT in  and chronic arthritic back pain initially presented on May 12th with acute on chronic low back pain and profound b/l LE weakness, found with leucocytosis and pancytopenia concerning for relapse ALL, admitted to the floors for further evaluation and management. Course complicated by gram negative bacteremia and septic shock. Transferred to MICU for hypotension requiring pressors and hypoxemia on bipap.        #Neuro  A&O x3      #Cardiovascular  On levo 0.3   Maintain MAP >65  250 cc albumin bolus    #Respiratory  Last ABG with reassuring ABG  Supplement O2 as needed.     #GI/Nutrition  Zofran 8mg q8hr  NPO   Prophylactic Protonix    #/Renal  Monitor UO and electrolytes    #ID   On Cefepime, Capsofungin   COVID neg x2    #Endocrine  ISS    #Hematologic/DVT ppx  ALL; pancytopenic on Gleevec, Ommeya, intrathecal methrotrexate, cytarabbien and Gleevec  Holding anticoagulation given thrombocytopenia  Will transfuse 1pRBC and 1 unit of platelets.     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 Course complicated by gram negative bacteremia and septic shock. Transferred to MICU for hypotension requiring pressors and hypoxemia on bipap.        PAST MEDICAL & SURGICAL HISTORY:  Herpes zoster  Leukemia  Clostridium difficile diarrhea  Intractable migraine with status migrainosus, unspecified migraine type  Sciatica of right side  Chronic gastritis, presence of bleeding unspecified, unspecified gastritis type  Essential hypertension  Lipoma: left side  Elective surgical procedure: ommaya placement  History of  delivery      FAMILY HISTORY:  No pertinent family history in first degree relatives      SOCIAL HISTORY:  Smoking: former smoker  Substance Use: No  EtOH Use: No    HOME MEDICATIONS:      Allergies    No Known Drug Allergies  shellfish (Nausea; Vomiting)    Intolerances          REVIEW OF SYSTEMS:    [ x] Unable to assess ROS because of critical medical condition.     OBJECTIVE:  ICU Vital Signs Last 24 Hrs  T(C): 36.6 (23 May 2020 04:15), Max: 38.4 (22 May 2020 10:45)  T(F): 97.9 (23 May 2020 04:15), Max: 101.1 (22 May 2020 10:45)  HR: 78 (23 May 2020 04:15) (78 - 132)  BP: 97/663 (23 May 2020 04:15) (83/55 - 134/83)  BP(mean): --  ABP: --  ABP(mean): --  RR: 15 (23 May 2020 04:15) (15 - 20)  SpO2: 95% (23 May 2020 01:56) (95% - 100%)        05-21 @ 07:01  -  -22 @ 07:00  --------------------------------------------------------  IN: 2497.9 mL / OUT: 3000 mL / NET: -502.1 mL     @ 07:01  -   @ 06:02  --------------------------------------------------------  IN: 4530 mL / OUT: 300 mL / NET: 4230 mL      CAPILLARY BLOOD GLUCOSE      POCT Blood Glucose.: 164 mg/dL (22 May 2020 12:12)      PHYSICAL EXAM:  Gen: AAOx3  Head: NCAT  HEENT: EOMI, oral mucosa moist, normal conjunctiva  Lung: CTAB, no respiratory distress, no wheezes/rhonchi/rales B/L, speaking in full sentences  CV: RRR, no murmurs, rubs or gallops  Abd: soft, NTND, no guarding, no CVA tenderness  MSK: no visible deformities  Neuro: No focal sensory or motor deficits, normal CN exam   Skin: Warm, well perfused, no rash  Psych: normal affect.      LINES:     HOSPITAL MEDICATIONS:  MEDICATIONS  (STANDING):  allopurinol 300 milliGRAM(s) Oral daily  caspofungin IVPB      caspofungin IVPB 50 milliGRAM(s) IV Intermittent every 24 hours  cefepime   IVPB 2000 milliGRAM(s) IV Intermittent every 8 hours  cefepime   IVPB      chlorhexidine 4% Liquid 1 Application(s) Topical <User Schedule>  cytarabine PF IntraThecal (eMAR) 50 milliGRAM(s) IntraThecal once  dexAMETHasone  Injectable 4 milliGRAM(s) IV Push every 6 hours  imatinib 400 milliGRAM(s) Oral two times a day  lactulose Syrup 10 Gram(s) Oral every 8 hours  levETIRAcetam 500 milliGRAM(s) Oral two times a day  lidocaine   Patch 1 Patch Transdermal every 24 hours  melatonin 5 milliGRAM(s) Oral at bedtime  methotrexate PF IntraThecal (eMAR) 12 milliGRAM(s) IntraThecal once  methotrexate PF IntraThecal (eMAR) 12 milliGRAM(s) IntraThecal once  pantoprazole    Tablet 40 milliGRAM(s) Oral before breakfast  polyethylene glycol 3350 17 Gram(s) Oral every 12 hours  senna 2 Tablet(s) Oral every 12 hours  simethicone 80 milliGRAM(s) Chew every 6 hours  sodium chloride 0.9%. 500 milliLiter(s) (75 mL/Hr) IV Continuous <Continuous>  ursodiol Capsule 300 milliGRAM(s) Oral every 12 hours  vancomycin  IVPB 1000 milliGRAM(s) IV Intermittent every 12 hours    MEDICATIONS  (PRN):  methyl salicylate 15%/menthol 10% Topical Cream 1 Application(s) Topical three times a day PRN Pain  naloxone Injectable 0.1 milliGRAM(s) IV Push every 3 minutes PRN for sedation or RR < 10  ondansetron Injectable 8 milliGRAM(s) IV Push every 8 hours PRN Nausea and/or Vomiting  sodium chloride 0.9% lock flush 10 milliLiter(s) IV Push every 1 hour PRN Pre/post blood products, medications, blood draw, and to maintain line patency      LABS:                        7.5    <0.10 )-----------( 16       ( 23 May 2020 01:29 )             23.5     Hgb Trend: 7.5<--, 6.0<--, 7.2<--, 6.8<--, 7.9<--  05-22    135  |  106  |  30<H>  ----------------------------<  194<H>  4.5   |  17<L>  |  0.86    Ca    9.2      22 May 2020 06:58  Phos  3.1       Mg     1.9     22    TPro  5.5<L>  /  Alb  2.9<L>  /  TBili  1.2  /  DBili  0.7<H>  /  AST  65<H>  /  ALT  153<H>  /  AlkPhos  71  -22    Creatinine Trend: 0.86<--, 0.85<--, 0.84<--, 0.80<--, 0.96<--, 0.95<--  PT/INR - ( 21 May 2020 09:38 )   PT: 10.5 sec;   INR: 0.92 ratio         PTT - ( 21 May 2020 09:38 )  PTT:21.6 sec  Urinalysis Basic - ( 23 May 2020 04:35 )    Color: Lilia / Appearance: Slightly Turbid / S.025 / pH: x  Gluc: x / Ketone: Trace  / Bili: Negative / Urobili: <2 mg/dL   Blood: x / Protein: 30 mg/dL / Nitrite: Negative   Leuk Esterase: Negative / RBC: 0 /HPF / WBC 5 /HPF   Sq Epi: x / Non Sq Epi: 6 /HPF / Bacteria: Negative      Arterial Blood Gas:   @ 05:09  7.06/29/163/8/98/-20.8  ABG lactate: --    Venous Blood Gas:   @ 12:37  7.37/28/45/16/78  VBG Lactate: 5.2      MICROBIOLOGY:     RADIOLOGY & ADDITIONAL TESTS:      ASSESSMENT AND PLAN:  Ms Galeas is a 60y F with pmhx of Ph+ ALL  s/p halpo-identical SCT in  and chronic arthritic back pain initially presented on May 12th with acute on chronic low back pain and profound b/l LE weakness, found with leucocytosis and pancytopenia concerning for relapse ALL, admitted to the floors for further evaluation and management. Course complicated by gram negative bacteremia and septic shock. Transferred to MICU for hypotension requiring pressors and hypoxemia on bipap.        #Neuro  A&O x3  Asses mental status     #Cardiovascular  On levo 0.3   Maintain MAP >65  250 cc albumin bolus    #Respiratory  Last ABG with reassuring ABG  Supplement O2 as needed.     #GI/Nutrition  Zofran 8mg q8hr  NPO   Prophylactic Protonix    #/Renal  Monitor UO and electrolytes    #ID   On Cefepime, Capsofungin   COVID neg x2  Follow BC, UC    #Endocrine  ISS    #Hematologic/DVT ppx  ALL; pancytopenic on Gleevec, Ommeya, intrathecal methrotrexate, cytarabbien and Gleevec  Holding anticoagulation given thrombocytopenia  Will transfuse 1pRBC and 1 unit of platelets.     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 Course complicated by gram negative bacteremia and septic shock. Transferred to MICU for hypotension requiring pressors and hypoxemia on bipap.        PAST MEDICAL & SURGICAL HISTORY:  Herpes zoster  Leukemia  Clostridium difficile diarrhea  Intractable migraine with status migrainosus, unspecified migraine type  Sciatica of right side  Chronic gastritis, presence of bleeding unspecified, unspecified gastritis type  Essential hypertension  Lipoma: left side  Elective surgical procedure: ommaya placement  History of  delivery      FAMILY HISTORY:  No pertinent family history in first degree relatives      SOCIAL HISTORY:  Smoking: former smoker  Substance Use: No  EtOH Use: No    HOME MEDICATIONS:      Allergies    No Known Drug Allergies  shellfish (Nausea; Vomiting)    Intolerances          REVIEW OF SYSTEMS:    [ x] Unable to assess ROS because of critical medical condition.     OBJECTIVE:  ICU Vital Signs Last 24 Hrs  T(C): 36.6 (23 May 2020 04:15), Max: 38.4 (22 May 2020 10:45)  T(F): 97.9 (23 May 2020 04:15), Max: 101.1 (22 May 2020 10:45)  HR: 78 (23 May 2020 04:15) (78 - 132)  BP: 97/663 (23 May 2020 04:15) (83/55 - 134/83)  BP(mean): --  ABP: --  ABP(mean): --  RR: 15 (23 May 2020 04:15) (15 - 20)  SpO2: 95% (23 May 2020 01:56) (95% - 100%)        05-21 @ 07:01  -  -22 @ 07:00  --------------------------------------------------------  IN: 2497.9 mL / OUT: 3000 mL / NET: -502.1 mL     @ 07:01  -   @ 06:02  --------------------------------------------------------  IN: 4530 mL / OUT: 300 mL / NET: 4230 mL      CAPILLARY BLOOD GLUCOSE      POCT Blood Glucose.: 164 mg/dL (22 May 2020 12:12)      PHYSICAL EXAM:  Gen: AAOx3  Head: NCAT  HEENT: EOMI, oral mucosa moist, normal conjunctiva  Lung: CTAB, no respiratory distress, no wheezes/rhonchi/rales B/L, speaking in full sentences  CV: RRR, no murmurs, rubs or gallops  Abd: soft, NTND, no guarding, no CVA tenderness  MSK: no visible deformities  Neuro: No focal sensory or motor deficits, normal CN exam   Skin: Warm, well perfused, no rash  Psych: normal affect.      LINES:     HOSPITAL MEDICATIONS:  MEDICATIONS  (STANDING):  allopurinol 300 milliGRAM(s) Oral daily  caspofungin IVPB      caspofungin IVPB 50 milliGRAM(s) IV Intermittent every 24 hours  cefepime   IVPB 2000 milliGRAM(s) IV Intermittent every 8 hours  cefepime   IVPB      chlorhexidine 4% Liquid 1 Application(s) Topical <User Schedule>  cytarabine PF IntraThecal (eMAR) 50 milliGRAM(s) IntraThecal once  dexAMETHasone  Injectable 4 milliGRAM(s) IV Push every 6 hours  imatinib 400 milliGRAM(s) Oral two times a day  lactulose Syrup 10 Gram(s) Oral every 8 hours  levETIRAcetam 500 milliGRAM(s) Oral two times a day  lidocaine   Patch 1 Patch Transdermal every 24 hours  melatonin 5 milliGRAM(s) Oral at bedtime  methotrexate PF IntraThecal (eMAR) 12 milliGRAM(s) IntraThecal once  methotrexate PF IntraThecal (eMAR) 12 milliGRAM(s) IntraThecal once  pantoprazole    Tablet 40 milliGRAM(s) Oral before breakfast  polyethylene glycol 3350 17 Gram(s) Oral every 12 hours  senna 2 Tablet(s) Oral every 12 hours  simethicone 80 milliGRAM(s) Chew every 6 hours  sodium chloride 0.9%. 500 milliLiter(s) (75 mL/Hr) IV Continuous <Continuous>  ursodiol Capsule 300 milliGRAM(s) Oral every 12 hours  vancomycin  IVPB 1000 milliGRAM(s) IV Intermittent every 12 hours    MEDICATIONS  (PRN):  methyl salicylate 15%/menthol 10% Topical Cream 1 Application(s) Topical three times a day PRN Pain  naloxone Injectable 0.1 milliGRAM(s) IV Push every 3 minutes PRN for sedation or RR < 10  ondansetron Injectable 8 milliGRAM(s) IV Push every 8 hours PRN Nausea and/or Vomiting  sodium chloride 0.9% lock flush 10 milliLiter(s) IV Push every 1 hour PRN Pre/post blood products, medications, blood draw, and to maintain line patency      LABS:                        7.5    <0.10 )-----------( 16       ( 23 May 2020 01:29 )             23.5     Hgb Trend: 7.5<--, 6.0<--, 7.2<--, 6.8<--, 7.9<--  05-22    135  |  106  |  30<H>  ----------------------------<  194<H>  4.5   |  17<L>  |  0.86    Ca    9.2      22 May 2020 06:58  Phos  3.1       Mg     1.9     22    TPro  5.5<L>  /  Alb  2.9<L>  /  TBili  1.2  /  DBili  0.7<H>  /  AST  65<H>  /  ALT  153<H>  /  AlkPhos  71  -22    Creatinine Trend: 0.86<--, 0.85<--, 0.84<--, 0.80<--, 0.96<--, 0.95<--  PT/INR - ( 21 May 2020 09:38 )   PT: 10.5 sec;   INR: 0.92 ratio         PTT - ( 21 May 2020 09:38 )  PTT:21.6 sec  Urinalysis Basic - ( 23 May 2020 04:35 )    Color: Lilia / Appearance: Slightly Turbid / S.025 / pH: x  Gluc: x / Ketone: Trace  / Bili: Negative / Urobili: <2 mg/dL   Blood: x / Protein: 30 mg/dL / Nitrite: Negative   Leuk Esterase: Negative / RBC: 0 /HPF / WBC 5 /HPF   Sq Epi: x / Non Sq Epi: 6 /HPF / Bacteria: Negative      Arterial Blood Gas:   @ 05:09  7.06/29/163/8/98/-20.8  ABG lactate: --    Venous Blood Gas:   @ 12:37  7.37/28/45/16/78  VBG Lactate: 5.2      MICROBIOLOGY:     RADIOLOGY & ADDITIONAL TESTS:      ASSESSMENT AND PLAN:  Ms Galeas is a 60y F with pmhx of Ph+ ALL  s/p halpo-identical SCT in  and chronic arthritic back pain initially presented on May 12th with acute on chronic low back pain and profound b/l LE weakness, found with leucocytosis and pancytopenia concerning for relapse ALL, admitted to the floors for further evaluation and management. Course complicated by gram negative bacteremia and septic shock. Transferred to MICU for hypotension requiring pressors and hypoxemia on bipap.        #Neuro  A&O x3  Asses mental status     #Cardiovascular  On levo 0.3   Maintain MAP >65  250 cc albumin bolus    #Respiratory  Last ABG with reassuring ABG  Supplement O2 as needed.     #GI/Nutrition  Zofran 8mg q8hr  NPO   Prophylactic Protonix    #/Renal  Monitor UO and electrolytes  Monitor renal function given risk of tumor lysis syndrome    #ID   On Cefepime, Capsofungin   COVID neg x2  Follow BC, UC    #Endocrine  ISS    #Hematologic/DVT ppx  ALL; pancytopenic on Gleevec, Ommeya, intrathecal methrotrexate, cytarabbien and Gleevec  Holding anticoagulation given thrombocytopenia  Will transfuse 1pRBC and 1 unit of platelets.   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 Course complicated by gram negative bacteremia and septic shock. Transferred to MICU for hypotension requiring pressors and hypoxemia on bipap.        PAST MEDICAL & SURGICAL HISTORY:  Herpes zoster  Leukemia  Clostridium difficile diarrhea  Intractable migraine with status migrainosus, unspecified migraine type  Sciatica of right side  Chronic gastritis, presence of bleeding unspecified, unspecified gastritis type  Essential hypertension  Lipoma: left side  Elective surgical procedure: ommaya placement  History of  delivery      FAMILY HISTORY:  No pertinent family history in first degree relatives      SOCIAL HISTORY:  Smoking: former smoker  Substance Use: No  EtOH Use: No    HOME MEDICATIONS:      Allergies    No Known Drug Allergies  shellfish (Nausea; Vomiting)    Intolerances          REVIEW OF SYSTEMS:    [ x] Unable to assess ROS because of critical medical condition.     OBJECTIVE:  ICU Vital Signs Last 24 Hrs  T(C): 36.6 (23 May 2020 04:15), Max: 38.4 (22 May 2020 10:45)  T(F): 97.9 (23 May 2020 04:15), Max: 101.1 (22 May 2020 10:45)  HR: 78 (23 May 2020 04:15) (78 - 132)  BP: 97/663 (23 May 2020 04:15) (83/55 - 134/83)  BP(mean): --  ABP: --  ABP(mean): --  RR: 15 (23 May 2020 04:15) (15 - 20)  SpO2: 95% (23 May 2020 01:56) (95% - 100%)        05-21 @ 07:01  -  -22 @ 07:00  --------------------------------------------------------  IN: 2497.9 mL / OUT: 3000 mL / NET: -502.1 mL     @ 07:01  -   @ 06:02  --------------------------------------------------------  IN: 4530 mL / OUT: 300 mL / NET: 4230 mL      CAPILLARY BLOOD GLUCOSE      POCT Blood Glucose.: 164 mg/dL (22 May 2020 12:12)      PHYSICAL EXAM:  Gen: AAOx3  Head: NCAT  HEENT: EOMI, oral mucosa moist, normal conjunctiva  Lung: CTAB, no respiratory distress, no wheezes/rhonchi/rales B/L, speaking in full sentences  CV: RRR, no murmurs, rubs or gallops  Abd: soft, NTND, no guarding, no CVA tenderness  MSK: no visible deformities  Neuro: No focal sensory or motor deficits, normal CN exam   Skin: Warm, well perfused, no rash  Psych: normal affect.      LINES:     HOSPITAL MEDICATIONS:  MEDICATIONS  (STANDING):  allopurinol 300 milliGRAM(s) Oral daily  caspofungin IVPB      caspofungin IVPB 50 milliGRAM(s) IV Intermittent every 24 hours  cefepime   IVPB 2000 milliGRAM(s) IV Intermittent every 8 hours  cefepime   IVPB      chlorhexidine 4% Liquid 1 Application(s) Topical <User Schedule>  cytarabine PF IntraThecal (eMAR) 50 milliGRAM(s) IntraThecal once  dexAMETHasone  Injectable 4 milliGRAM(s) IV Push every 6 hours  imatinib 400 milliGRAM(s) Oral two times a day  lactulose Syrup 10 Gram(s) Oral every 8 hours  levETIRAcetam 500 milliGRAM(s) Oral two times a day  lidocaine   Patch 1 Patch Transdermal every 24 hours  melatonin 5 milliGRAM(s) Oral at bedtime  methotrexate PF IntraThecal (eMAR) 12 milliGRAM(s) IntraThecal once  methotrexate PF IntraThecal (eMAR) 12 milliGRAM(s) IntraThecal once  pantoprazole    Tablet 40 milliGRAM(s) Oral before breakfast  polyethylene glycol 3350 17 Gram(s) Oral every 12 hours  senna 2 Tablet(s) Oral every 12 hours  simethicone 80 milliGRAM(s) Chew every 6 hours  sodium chloride 0.9%. 500 milliLiter(s) (75 mL/Hr) IV Continuous <Continuous>  ursodiol Capsule 300 milliGRAM(s) Oral every 12 hours  vancomycin  IVPB 1000 milliGRAM(s) IV Intermittent every 12 hours    MEDICATIONS  (PRN):  methyl salicylate 15%/menthol 10% Topical Cream 1 Application(s) Topical three times a day PRN Pain  naloxone Injectable 0.1 milliGRAM(s) IV Push every 3 minutes PRN for sedation or RR < 10  ondansetron Injectable 8 milliGRAM(s) IV Push every 8 hours PRN Nausea and/or Vomiting  sodium chloride 0.9% lock flush 10 milliLiter(s) IV Push every 1 hour PRN Pre/post blood products, medications, blood draw, and to maintain line patency      LABS:                        7.5    <0.10 )-----------( 16       ( 23 May 2020 01:29 )             23.5     Hgb Trend: 7.5<--, 6.0<--, 7.2<--, 6.8<--, 7.9<--  05-22    135  |  106  |  30<H>  ----------------------------<  194<H>  4.5   |  17<L>  |  0.86    Ca    9.2      22 May 2020 06:58  Phos  3.1       Mg     1.9     22    TPro  5.5<L>  /  Alb  2.9<L>  /  TBili  1.2  /  DBili  0.7<H>  /  AST  65<H>  /  ALT  153<H>  /  AlkPhos  71  -22    Creatinine Trend: 0.86<--, 0.85<--, 0.84<--, 0.80<--, 0.96<--, 0.95<--  PT/INR - ( 21 May 2020 09:38 )   PT: 10.5 sec;   INR: 0.92 ratio         PTT - ( 21 May 2020 09:38 )  PTT:21.6 sec  Urinalysis Basic - ( 23 May 2020 04:35 )    Color: Lilia / Appearance: Slightly Turbid / S.025 / pH: x  Gluc: x / Ketone: Trace  / Bili: Negative / Urobili: <2 mg/dL   Blood: x / Protein: 30 mg/dL / Nitrite: Negative   Leuk Esterase: Negative / RBC: 0 /HPF / WBC 5 /HPF   Sq Epi: x / Non Sq Epi: 6 /HPF / Bacteria: Negative      Arterial Blood Gas:   @ 05:09  7.06/29/163/8/98/-20.8  ABG lactate: --    Venous Blood Gas:   @ 12:37  7.37/28/45/16/78  VBG Lactate: 5.2      MICROBIOLOGY:     RADIOLOGY & ADDITIONAL TESTS:      ASSESSMENT AND PLAN:  Ms Galeas is a 60y F with pmhx of Ph+ ALL  s/p halpo-identical SCT in  and chronic arthritic back pain initially presented on May 12th with acute on chronic low back pain and profound b/l LE weakness, found with leucocytosis and pancytopenia concerning for relapse ALL, admitted to the floors for further evaluation and management. Course complicated by gram negative bacteremia and septic shock. Transferred to MICU for hypotension requiring pressors and hypoxemia on bipap.        #Neuro  A&O x3  Asses mental status     #Cardiovascular  On levo 0.3   Maintain MAP >65  250 cc albumin bolus    #Respiratory  Last ABG with reassuring ABG  Supplement O2 as needed.     #GI/Nutrition  Zofran 8mg q8hr  NPO   Prophylactic Protonix    #/Renal  Monitor UO and electrolytes  Monitor renal function given risk of tumor lysis syndrome    #ID   On Cefepime, Capsofungin   COVID neg x2  Follow BC, UC    #Endocrine  ISS    #Hematologic/DVT ppx  ALL; pancytopenic on Gleevec, Ommeya, intrathecal methrotrexate, cytarabbien and Gleevec  Holding anticoagulation given thrombocytopenia  Will transfuse 1pRBC and 1 unit of platelets.   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 Clinical course complicated with Pancytopenia, Gram Negative Bacteremia, HAGMA, Lactic Acidosis, Hyperkalemia and Septic Shock.   - Transferred to MICU for Septic Shock requiring Pressors titration   - Acute Hypoxic Respiratory Failure on NCO2 vs. BiPAP support   - Anemia and Pancytopenia pending transfusion prn   - Empiric ABx Coverage for Neutropenic Sepsis and E. Coli Bacteremia    - Acute on Chronic Pain Syndrome on Opiate regimen need reevaluation   - Cervical Spine and Leptomeningeal Metastasis on CXT and Neurology F/U   - HAG-MA and Lactate 9.1 need W/U and R/O GVHD     Patient seen and examined with ICU Resident/Fellow at bedside and lab data, medical records and radiology reports reviewed. I have read and agreeable with resident's Documentation, Assessment and Management Plans above in general which reflected my opinions from discussion.   Total Critical Care Time = 45 Min excluding teaching and procedure activity. 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 Course complicated by gram negative bacteremia and septic shock. Transferred to MICU for hypotension requiring pressors and hypoxemia on bipap.        PAST MEDICAL & SURGICAL HISTORY:  Herpes zoster  Leukemia  Clostridium difficile diarrhea  Intractable migraine with status migrainosus, unspecified migraine type  Sciatica of right side  Chronic gastritis, presence of bleeding unspecified, unspecified gastritis type  Essential hypertension  Lipoma: left side  Elective surgical procedure: ommaya placement  History of  delivery      FAMILY HISTORY:  No pertinent family history in first degree relatives      SOCIAL HISTORY:  Smoking: former smoker  Substance Use: No  EtOH Use: No    HOME MEDICATIONS:      Allergies    No Known Drug Allergies  shellfish (Nausea; Vomiting)    Intolerances          REVIEW OF SYSTEMS:    [ x] Unable to assess ROS because of critical medical condition.     OBJECTIVE:  ICU Vital Signs Last 24 Hrs  T(C): 36.6 (23 May 2020 04:15), Max: 38.4 (22 May 2020 10:45)  T(F): 97.9 (23 May 2020 04:15), Max: 101.1 (22 May 2020 10:45)  HR: 78 (23 May 2020 04:15) (78 - 132)  BP: 97/663 (23 May 2020 04:15) (83/55 - 134/83)  BP(mean): --  ABP: --  ABP(mean): --  RR: 15 (23 May 2020 04:15) (15 - 20)  SpO2: 95% (23 May 2020 01:56) (95% - 100%)        05-21 @ 07:01  -  -22 @ 07:00  --------------------------------------------------------  IN: 2497.9 mL / OUT: 3000 mL / NET: -502.1 mL     @ 07:01  -   @ 06:02  --------------------------------------------------------  IN: 4530 mL / OUT: 300 mL / NET: 4230 mL      CAPILLARY BLOOD GLUCOSE      POCT Blood Glucose.: 164 mg/dL (22 May 2020 12:12)      PHYSICAL EXAM:  Gen: AAOx3  Head: NCAT  HEENT: EOMI, oral mucosa moist, normal conjunctiva  Lung: CTAB, no respiratory distress, no wheezes/rhonchi/rales B/L, speaking in full sentences  CV: RRR, no murmurs, rubs or gallops  Abd: soft, NTND, no guarding, no CVA tenderness  MSK: no visible deformities  Neuro: No focal sensory or motor deficits, normal CN exam   Skin: Warm, well perfused, no rash  Psych: normal affect.      LINES:     HOSPITAL MEDICATIONS:  MEDICATIONS  (STANDING):  allopurinol 300 milliGRAM(s) Oral daily  caspofungin IVPB      caspofungin IVPB 50 milliGRAM(s) IV Intermittent every 24 hours  cefepime   IVPB 2000 milliGRAM(s) IV Intermittent every 8 hours  cefepime   IVPB      chlorhexidine 4% Liquid 1 Application(s) Topical <User Schedule>  cytarabine PF IntraThecal (eMAR) 50 milliGRAM(s) IntraThecal once  dexAMETHasone  Injectable 4 milliGRAM(s) IV Push every 6 hours  imatinib 400 milliGRAM(s) Oral two times a day  lactulose Syrup 10 Gram(s) Oral every 8 hours  levETIRAcetam 500 milliGRAM(s) Oral two times a day  lidocaine   Patch 1 Patch Transdermal every 24 hours  melatonin 5 milliGRAM(s) Oral at bedtime  methotrexate PF IntraThecal (eMAR) 12 milliGRAM(s) IntraThecal once  methotrexate PF IntraThecal (eMAR) 12 milliGRAM(s) IntraThecal once  pantoprazole    Tablet 40 milliGRAM(s) Oral before breakfast  polyethylene glycol 3350 17 Gram(s) Oral every 12 hours  senna 2 Tablet(s) Oral every 12 hours  simethicone 80 milliGRAM(s) Chew every 6 hours  sodium chloride 0.9%. 500 milliLiter(s) (75 mL/Hr) IV Continuous <Continuous>  ursodiol Capsule 300 milliGRAM(s) Oral every 12 hours  vancomycin  IVPB 1000 milliGRAM(s) IV Intermittent every 12 hours    MEDICATIONS  (PRN):  methyl salicylate 15%/menthol 10% Topical Cream 1 Application(s) Topical three times a day PRN Pain  naloxone Injectable 0.1 milliGRAM(s) IV Push every 3 minutes PRN for sedation or RR < 10  ondansetron Injectable 8 milliGRAM(s) IV Push every 8 hours PRN Nausea and/or Vomiting  sodium chloride 0.9% lock flush 10 milliLiter(s) IV Push every 1 hour PRN Pre/post blood products, medications, blood draw, and to maintain line patency      LABS:                        7.5    <0.10 )-----------( 16       ( 23 May 2020 01:29 )             23.5     Hgb Trend: 7.5<--, 6.0<--, 7.2<--, 6.8<--, 7.9<--  05-22    135  |  106  |  30<H>  ----------------------------<  194<H>  4.5   |  17<L>  |  0.86    Ca    9.2      22 May 2020 06:58  Phos  3.1       Mg     1.9     22    TPro  5.5<L>  /  Alb  2.9<L>  /  TBili  1.2  /  DBili  0.7<H>  /  AST  65<H>  /  ALT  153<H>  /  AlkPhos  71  -22    Creatinine Trend: 0.86<--, 0.85<--, 0.84<--, 0.80<--, 0.96<--, 0.95<--  PT/INR - ( 21 May 2020 09:38 )   PT: 10.5 sec;   INR: 0.92 ratio         PTT - ( 21 May 2020 09:38 )  PTT:21.6 sec  Urinalysis Basic - ( 23 May 2020 04:35 )    Color: Lilia / Appearance: Slightly Turbid / S.025 / pH: x  Gluc: x / Ketone: Trace  / Bili: Negative / Urobili: <2 mg/dL   Blood: x / Protein: 30 mg/dL / Nitrite: Negative   Leuk Esterase: Negative / RBC: 0 /HPF / WBC 5 /HPF   Sq Epi: x / Non Sq Epi: 6 /HPF / Bacteria: Negative      Arterial Blood Gas:   @ 05:09  7.06/29/163/8/98/-20.8  ABG lactate: --    Venous Blood Gas:   @ 12:37  7.37/28/45/16/78  VBG Lactate: 5.2      MICROBIOLOGY:     RADIOLOGY & ADDITIONAL TESTS:      ASSESSMENT AND PLAN:  Ms Galeas is a 60y F with pmhx of Ph+ ALL  s/p halpo-identical SCT in  and chronic arthritic back pain initially presented on May 12th with acute on chronic low back pain and profound b/l LE weakness, found with leucocytosis and pancytopenia concerning for relapse ALL, admitted to the floors for further evaluation and management. Course complicated by gram negative bacteremia and septic shock. Transferred to MICU for hypotension requiring pressors and hypoxemia on bipap.        #Neuro  A&O x3  Asses mental status     #Cardiovascular  On levo 0.3   Maintain MAP >65  250 cc albumin bolus    #Respiratory  Last ABG with reassuring ABG  Supplement O2 as needed.     #GI/Nutrition  Zofran 8mg q8hr  NPO   Prophylactic Protonix    #/Renal  Monitor UO and electrolytes  Monitor renal function given risk of tumor lysis syndrome    #ID   On Cefepime, Capsofungin   COVID neg x2  Follow BC, UC    #Endocrine  ISS    #Hematologic/DVT ppx  ALL; pancytopenic on Gleevec, Ommeya, intrathecal methrotrexate, cytarabbien and Gleevec  Holding anticoagulation given thrombocytopenia  Will transfuse 1pRBC and 1 unit of platelets.   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 Clinical course complicated with Pancytopenia, Gram Negative Bacteremia, HAGMA, Lactic Acidosis, Hyperkalemia and Septic Shock.   - Transferred to MICU for Septic Shock requiring Pressors titration   - Acute Hypoxic Respiratory Failure on NCO2 vs. BiPAP support   - Anemia and Pancytopenia pending transfusion prn   - Empiric ABx Coverage for Neutropenic Sepsis and E. Coli Bacteremia    - Acute on Chronic Pain Syndrome on Opiate regimen need reevaluation   - Cervical Spine and Leptomeningeal Metastasis on CXT and Neurology F/U   - HAG-MA and Lactate 9.1 need further W/U and R/O GVHD     Patient seen and examined with ICU Resident/Fellow at bedside and lab data, medical records and radiology reports reviewed. I have read and agreeable with resident's Documentation, Assessment and Management Plans above in general which reflected my opinions from discussion.   Total Critical Care Time = 45 Min excluding teaching and procedure activity. 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 Patient found to be bicytopenic on admission with a white blood cell count 80K and significant blast count suggestive of relapsed ALL. Patient was initially treated with hydrea, and then gleevec. She had extensive imaging of her brain and spine with evidence of enhancement of the pituitary stalk possibly 2/2 leukemic infiltrate. MRI of the spine with T2 enhancement along the conus ends. Seen by NSGY during this admission -- currently being treated with IT chemotherapy. Course was compounded by GNR bacteremia with RRT on  - - given 1 unit of pRBC and 1 liter of fluid. MICU consulted on  for hypotension which was in the setting of being on dilaudid gtt. Second RRT called on  for hypoxia and hypotension and patient was was started on levophed.       PAST MEDICAL & SURGICAL HISTORY:  Herpes zoster  Leukemia  Clostridium difficile diarrhea  Intractable migraine with status migrainosus, unspecified migraine type  Sciatica of right side  Chronic gastritis, presence of bleeding unspecified, unspecified gastritis type  Essential hypertension  Lipoma: left side  Elective surgical procedure: ommaya placement  History of  delivery      FAMILY HISTORY:  No pertinent family history in first degree relatives      SOCIAL HISTORY:  Smoking: former smoker  Substance Use: No  EtOH Use: No    HOME MEDICATIONS:      Allergies    No Known Drug Allergies  shellfish (Nausea; Vomiting)    Intolerances          REVIEW OF SYSTEMS:    [ x] Unable to assess ROS because of critical medical condition.     OBJECTIVE:  ICU Vital Signs Last 24 Hrs  T(C): 36.6 (23 May 2020 04:15), Max: 38.4 (22 May 2020 10:45)  T(F): 97.9 (23 May 2020 04:15), Max: 101.1 (22 May 2020 10:45)  HR: 78 (23 May 2020 04:15) (78 - 132)  BP: 97/663 (23 May 2020 04:15) (83/55 - 134/83)  BP(mean): --  ABP: --  ABP(mean): --  RR: 15 (23 May 2020 04:15) (15 - 20)  SpO2: 95% (23 May 2020 01:56) (95% - 100%)        05-21 @ 07:  -   @ 07:00  --------------------------------------------------------  IN: 2497.9 mL / OUT: 3000 mL / NET: -502.1 mL     @ 07: @ 06:02  --------------------------------------------------------  IN: 4530 mL / OUT: 300 mL / NET: 4230 mL      CAPILLARY BLOOD GLUCOSE      POCT Blood Glucose.: 164 mg/dL (22 May 2020 12:12)      PHYSICAL EXAM:  Gen: AAOx3  Head: NCAT  HEENT: EOMI, oral mucosa moist, normal conjunctiva  Lung: CTAB, no respiratory distress, no wheezes/rhonchi/rales B/L, speaking in full sentences  CV: RRR, no murmurs, rubs or gallops  Abd: soft, NTND, no guarding, no CVA tenderness  MSK: no visible deformities  Neuro: No focal sensory or motor deficits, normal CN exam   Skin: Warm, well perfused, no rash  Psych: normal affect.      LINES:     HOSPITAL MEDICATIONS:  MEDICATIONS  (STANDING):  allopurinol 300 milliGRAM(s) Oral daily  caspofungin IVPB      caspofungin IVPB 50 milliGRAM(s) IV Intermittent every 24 hours  cefepime   IVPB 2000 milliGRAM(s) IV Intermittent every 8 hours  cefepime   IVPB      chlorhexidine 4% Liquid 1 Application(s) Topical <User Schedule>  cytarabine PF IntraThecal (eMAR) 50 milliGRAM(s) IntraThecal once  dexAMETHasone  Injectable 4 milliGRAM(s) IV Push every 6 hours  imatinib 400 milliGRAM(s) Oral two times a day  lactulose Syrup 10 Gram(s) Oral every 8 hours  levETIRAcetam 500 milliGRAM(s) Oral two times a day  lidocaine   Patch 1 Patch Transdermal every 24 hours  melatonin 5 milliGRAM(s) Oral at bedtime  methotrexate PF IntraThecal (eMAR) 12 milliGRAM(s) IntraThecal once  methotrexate PF IntraThecal (eMAR) 12 milliGRAM(s) IntraThecal once  pantoprazole    Tablet 40 milliGRAM(s) Oral before breakfast  polyethylene glycol 3350 17 Gram(s) Oral every 12 hours  senna 2 Tablet(s) Oral every 12 hours  simethicone 80 milliGRAM(s) Chew every 6 hours  sodium chloride 0.9%. 500 milliLiter(s) (75 mL/Hr) IV Continuous <Continuous>  ursodiol Capsule 300 milliGRAM(s) Oral every 12 hours  vancomycin  IVPB 1000 milliGRAM(s) IV Intermittent every 12 hours    MEDICATIONS  (PRN):  methyl salicylate 15%/menthol 10% Topical Cream 1 Application(s) Topical three times a day PRN Pain  naloxone Injectable 0.1 milliGRAM(s) IV Push every 3 minutes PRN for sedation or RR < 10  ondansetron Injectable 8 milliGRAM(s) IV Push every 8 hours PRN Nausea and/or Vomiting  sodium chloride 0.9% lock flush 10 milliLiter(s) IV Push every 1 hour PRN Pre/post blood products, medications, blood draw, and to maintain line patency      LABS:                        7.5    <0.10 )-----------( 16       ( 23 May 2020 01:29 )             23.5     Hgb Trend: 7.5<--, 6.0<--, 7.2<--, 6.8<--, 7.9<--  0522    135  |  106  |  30<H>  ----------------------------<  194<H>  4.5   |  17<L>  |  0.86    Ca    9.2      22 May 2020 06:58  Phos  3.1       Mg     1.9         TPro  5.5<L>  /  Alb  2.9<L>  /  TBili  1.2  /  DBili  0.7<H>  /  AST  65<H>  /  ALT  153<H>  /  AlkPhos  71      Creatinine Trend: 0.86<--, 0.85<--, 0.84<--, 0.80<--, 0.96<--, 0.95<--  PT/INR - ( 21 May 2020 09:38 )   PT: 10.5 sec;   INR: 0.92 ratio         PTT - ( 21 May 2020 09:38 )  PTT:21.6 sec  Urinalysis Basic - ( 23 May 2020 04:35 )    Color: Lilia / Appearance: Slightly Turbid / S.025 / pH: x  Gluc: x / Ketone: Trace  / Bili: Negative / Urobili: <2 mg/dL   Blood: x / Protein: 30 mg/dL / Nitrite: Negative   Leuk Esterase: Negative / RBC: 0 /HPF / WBC 5 /HPF   Sq Epi: x / Non Sq Epi: 6 /HPF / Bacteria: Negative      Arterial Blood Gas:   @ 05:09  7.06/29/163/8/98/-20.8  ABG lactate: --    Venous Blood Gas:   @ 12:37  7.37/28/45/16/78  VBG Lactate: 5.2      MICROBIOLOGY:     RADIOLOGY & ADDITIONAL TESTS:      ASSESSMENT AND PLAN:  This is a a 60 year old female with 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 Patient found to be bicytopenic on admission with a white blood cell count 80K and significant blast count suggestive of relapsed ALL. Patient was initially treated with hydrea, and then gleevec. She had extensive imaging of her brain and spine with evidence of enhancement of the pituitary stalk possibly 2/2 leukemic infiltrate. MRI of the spine with T2 enhancement along the conus ends. Seen by NSGY during this admission -- currently being treated with IT chemotherapy. Course was compounded by GNR bacteremia with RRT on  - - given 1 unit of pRBC and 1 liter of fluid. MICU consulted on  for hypotension which was in the setting of being on dilaudid gtt. Second RRT called on  for hypoxia and hypotension and patient was was started on levophed. Patient was transferred to MICU for septic shock 2/2 E-coli bacteremia     # Neuro   1) Leukemic CNS disease    - Relapsed ALL with CNS involvement (had Ommaya reservoir tapped with 19% "other" cells)   - MRI brain with enhancement of the pituitary stalk suggestive of leukemic infiltration  - received IT MTX on 5/15 and plan for IT chemo on /   - Ommaya continually tested with last CSF showing 20% of cells, tested q3-q 4 days     2) Weakness  - patient with T2 enhancement at the ends of the conus on CT pan spine  - will reconsult NSGY to see if there is any role for intervention, previously seen but were awaiting imaging and currently getting IT MTX     # CVS   - currently in shock state presumed to be 2/2 septic shock from E-coli bacteremia ?possibly 2/2 urine source with urine culture from  positive (pan sensitive) vs gut pathology   - will continue with Cefepime/Caspo/Vanc - - given Ommaya will want to cover for MRSA and Pseudomonas  - last ECHO from  with mild global LV dysfunction and EF of 48%  - goal MAP >65     # Pulm  - briefly on Bipap during RRT for concern for hypoxia, however PO2 of 160 on ABG, now on NC  - no evidence of volume overload and no evidence of lobar consolidation to suggest PNA      # Heme  - hx of ALL s/p Auto SCT in  c/b GVHD and Haplo SCT in  found to be bicytopenic with 80K WBC and blast percentage suggestive of ALL relapse   - s/p Cytarabine on  and currently getting IT Chemo on Tues/Friday   - will give transfusional support as needed --> ordered for 1 unit pRBC and 1 unit plt on transfer to the unit (for Central line insertion)  - bleeding precautions (keep plt >15 and Hb >7)   - appreciate heme recs     # GI   - will obtain AXR and CXR 2/2 large gastric bubble seen (GOO vs ileus)  - patient complaining of abdominal pain, will order for CT A/P w/ IV contrast if Kidney function permits given the lactate of 9, however Gleevec also known to cause abd pain   - will keep NPO at this time   - Pantoprazole 40 QD   - if patient has diarrhea - - would send stool cultures, GI PCR and C-dif given hx of C-dif in the past     # Renal  - Metabolic acidosis with lactic acidemia presumably from hypoperfusion 2/2 sepsis.   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 Clinical course complicated with Pancytopenia, Gram Negative Bacteremia, HAGMA, Lactic Acidosis, Hyperkalemia and Septic Shock.   - Transferred to MICU for Septic Shock requiring Pressors titration   - Acute Hypoxic Respiratory Failure on NCO2 vs. BiPAP support   - Anemia and Pancytopenia pending transfusion prn   - Empiric ABx Coverage for Neutropenic Sepsis and E. Coli Bacteremia    - Acute on Chronic Pain Syndrome on Opiate regimen need reevaluation   - Cervical Spine and Leptomeningeal Metastasis on CXT and Neurology F/U   - HAG-MA and Lactate 9.1 need further W/U and R/O GVHD     Patient seen and examined with ICU Resident/Fellow at bedside and lab data, medical records and radiology reports reviewed. I have read and agreeable with resident's Documentation, Assessment and Management Plans above in general which reflected my opinions from discussion.   Total Critical Care Time = 45 Min excluding teaching and procedure activity.

## 2020-05-23 NOTE — PROVIDER CONTACT NOTE (CRITICAL VALUE NOTIFICATION) - ACTION/TREATMENT ORDERED:
NO ORDERS RECIEVED
CBC after transfusion completed
Fever work up and antibiotics started.
Insulin, D50 ivp sodium bicarb IVP during code
New treatment ordered. Transfuse 1 unit platelets sd, ld irr
None
PRBC orderd.
awaiting orders
continue on cefepime antibiotics
monitor pt, no transfusion at this time
orders pending
transfuse 1 unit of plts and 1 unit of PRBC

## 2020-05-23 NOTE — PROCEDURE NOTE - PROCEDURE DATE TIME, MLM
21-May-2020 14:30
23-May-2020 07:30
23-May-2020 08:00
23-May-2020 13:00
15-May-2020 12:00
23-May-2020 06:30

## 2020-05-23 NOTE — CHART NOTE - NSCHARTNOTEFT_GEN_A_CORE
Notified by RN of lab result lactate=5.8.  Normal saline bolus 1 L x 1 ordered.  Repeat lactate following bolus. Continue to closely monitor; f/u with primary team in am.    Balbina Erickson ANP ext [phone]

## 2020-05-23 NOTE — PROGRESS NOTE ADULT - ATTENDING COMMENTS
Agree with above. Patient seen and examined. Patient critically ill and appears to be dying. She is in multiorgan failure with E. coli bacteremia. She is requiring increasing vasopressor doses. She is intubated this AM for respiratory distress and hypoxic respiratory failure. She is in NERI likely due to shock with ATN and will require CRRT if she is able to stabilize from a hemodynamic standpoint.    Patient is critically ill requiring frequent bedside visits with therapy change.  Patient had a cardiac arrest this morning requiring CPR and ultimately with ROSC.

## 2020-05-23 NOTE — PROVIDER CONTACT NOTE (CRITICAL VALUE NOTIFICATION) - NAME OF MD/NP/PA/DO NOTIFIED:
Ambrocio GLASS
Balbina Harrison NP
Balbina Harrison NP
Balbina Harrison, NP
Balbina White NP
Cedric,NP
Cedric,Np
Dr Streeter
Dr. Huitron
Ean SU
Ean VICTORIA
GEORGES Doss
kimberly GLASS

## 2020-05-23 NOTE — CONSULT NOTE ADULT - CONSULT REASON
ALL, concern for relapse
LBP and UE and LE tingling numbness
NERI  ACIDOSIS  HYPERKALEMIA
hypotension
pain

## 2020-05-23 NOTE — PROGRESS NOTE ADULT - ASSESSMENT
Ms Galeas is a 60y F with pmhx of Ph+ ALL  s/p halpo-identical SCT in 2019 and chronic arthritic back pain initially presented on May 12th with acute on chronic low back pain and profound b/l LE weakness, found with leucocytosis and pancytopenia concerning for relapse ALL, admitted to the floors for further evaluation and management. Course complicated by gram negative bacteremia and septic shock. Transferred to MICU for hypotension requiring pressors and hypoxemia on bipap.        #Neuro  A&O x3  Asses mental status     #Cardiovascular  On levo 0.3   Maintain MAP >65  250 cc albumin bolus    #Respiratory  Last ABG with reassuring ABG  Supplement O2 as needed.     #GI/Nutrition  Zofran 8mg q8hr  NPO   Prophylactic Protonix    #/Renal  Monitor UO and electrolytes  Monitor renal function given risk of tumor lysis syndrome    #ID   On Cefepime, Capsofungin   COVID neg x2  Follow BC, UC    #Endocrine  ISS    #Hematologic/DVT ppx  ALL; pancytopenic on Gleevec, Ommeya, intrathecal methrotrexate, cytarabbien and Gleevec  Holding anticoagulation given thrombocytopenia  Will transfuse 1pRBC and 1 unit of platelets.   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 Patient found to be bicytopenic on admission with a white blood cell count 80K and significant blast count suggestive of relapsed ALL. Patient was initially treated with hydrea, and then gleevec. She had extensive imaging of her brain and spine with evidence of enhancement of the pituitary stalk possibly 2/2 leukemic infiltrate. MRI of the spine with T2 enhancement along the conus ends. Seen by NSGY during this admission -- currently being treated with IT chemotherapy. Course was compounded by GNR bacteremia with RRT on 5/22 - - given 1 unit of pRBC and 1 liter of fluid. MICU consulted on 5/22 for hypotension which was in the setting of being on dilaudid gtt. Second RRT called on 5/23 for hypoxia and hypotension and patient was was started on levophed. Patient was transferred to MICU for septic shock 2/2 E-coli bacteremia     # Neuro   1) Leukemic CNS disease    - Relapsed ALL with CNS involvement (had Ommaya reservoir tapped with 19% "other" cells)   - MRI brain with enhancement of the pituitary stalk suggestive of leukemic infiltration  - received IT MTX on 5/15 and plan for IT chemo on Tuesdays/Fridays   - Ommaya continually tested with last CSF showing 20% of cells, tested q3-q 4 days     2) Weakness  - patient with T2 enhancement at the ends of the conus on CT pan spine  - will reconsult NSGY to see if there is any role for intervention, previously seen but were awaiting imaging and currently getting IT MTX     # CVS   - currently in shock state presumed to be 2/2 septic shock from E-coli bacteremia ?possibly 2/2 urine source with urine culture from 5/13 positive (pan sensitive) vs gut pathology   - will continue with Cefepime/Caspo/Vanc - - given Ommaya will want to cover for MRSA and Pseudomonas  - last ECHO from 11/18 with mild global LV dysfunction and EF of 48%  - goal MAP >65     # Pulm  - briefly on Bipap during RRT for concern for hypoxia, however PO2 of 160 on ABG, now on NC  - no evidence of volume overload and no evidence of lobar consolidation to suggest PNA      # Heme  - hx of ALL s/p Auto SCT in 2016 c/b GVHD and Haplo SCT in 2019 found to be bicytopenic with 80K WBC and blast percentage suggestive of ALL relapse   - s/p Cytarabine on 5/19 and currently getting IT Chemo on Tues/Friday   - will give transfusional support as needed --> ordered for 1 unit pRBC and 1 unit plt on transfer to the unit (for Central line insertion)  - bleeding precautions (keep plt >15 and Hb >7)   - appreciate heme recs     # GI   - will obtain AXR and CXR 2/2 large gastric bubble seen (GOO vs ileus)  - patient complaining of abdominal pain, will order for CT A/P w/ IV contrast if Kidney function permits given the lactate of 9, however Gleevec also known to cause abd pain   - will keep NPO at this time   - Pantoprazole 40 QD   - if patient has diarrhea - - would send stool cultures, GI PCR and C-dif given hx of C-dif in the past     # Renal  - Metabolic acidosis with lactic acidemia presumably from hypoperfusion 2/2 sepsis.   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 Patient found to be bicytopenic on admission with a white blood cell count 80K and significant blast count suggestive of relapsed ALL. Patient was initially treated with hydrea, and then gleevec. She had extensive imaging of her brain and spine with evidence of enhancement of the pituitary stalk possibly 2/2 leukemic infiltrate. MRI of the spine with T2 enhancement along the conus ends. Seen by NSGY during this admission -- currently being treated with IT chemotherapy. Course was compounded by GNR bacteremia with RRT on 5/22 - - given 1 unit of pRBC and 1 liter of fluid. MICU consulted on 5/22 for hypotension which was in the setting of being on dilaudid gtt. Second RRT called on 5/23 for hypoxia and hypotension and patient was was started on levophed. Patient was transferred to MICU for septic shock 2/2 E-coli bacteremia.    # Neuro   1) Leukemic CNS disease    - Relapsed ALL with CNS involvement (had Ommaya reservoir tapped with 19% "other" cells)   - MRI brain with enhancement of the pituitary stalk suggestive of leukemic infiltration -- no further intervention per NSG  - received IT MTX on 5/15 and plan for IT chemo on Tuesdays/Fridays - will hold per BMT team  - Ommaya continually tested with last CSF showing 20% of cells, tested q3-q 4 days     2) Weakness  - patient with T2 enhancement at the ends of the conus on CT pan spine  - per NSGY, no further role for intervention    # CVS   - currently in shock state presumed to be 2/2 septic shock from E-coli bacteremia ?possibly 2/2 urine source with urine culture from 5/13 positive (pan sensitive) vs gut pathology   - will continue with [place]/Caspo/Vanc - - given Ommaya will want to cover for MRSA and Pseudomonas  - no indication to reculture from ommaya per NSG  - last ECHO from 11/18 with mild global LV dysfunction and EF of 48%  - goal MAP >65     # Pulm  - briefly on Bipap during RRT for concern for hypoxia, however PO2 of 160 on ABG, then NC, now s/p intubation for airway protection  - no evidence of volume overload and no evidence of lobar consolidation to suggest PNA      # Heme  - hx of ALL s/p Auto SCT in 2016 c/b GVHD and Haplo SCT in 2019 found to be bicytopenic with 80K WBC and blast percentage suggestive of ALL relapse   - s/p Cytarabine on 5/19 and currently getting IT Chemo on Tues/Friday   - will give transfusional support as needed --> ordered for 1 unit pRBC and 1 unit plt on transfer to the unit (for Central line insertion)  - bleeding precautions (keep plt >15 and Hb >7)   - appreciate heme recs     # GI   - will obtain AXR and CXR 2/2 large gastric bubble seen (GOO vs ileus). CT abd/pelvis pending for further eval  - patient complaining of abdominal pain, will order for CT A/P w/ IV contrast if Kidney function permits given the lactate of 9, however Gleevec also known to cause abd pain   - will keep NPO at this time   - Pantoprazole 40 QD   - if patient has diarrhea - - would send stool cultures, GI PCR and C-dif given hx of C-dif in the past     # Renal  - Metabolic acidosis with lactic acidemia presumably from hypoperfusion 2/2 sepsis.   - will obtain tumor lysis labs, and will obtain CT A/p to rule out gut pathology as source of lactate  - will start HCO3 gtt with BMP q 4 hours, given HCO3 8 on Blood gas  - monitor I/Os   - hyperkalemia 2/2 worsening renal failure- given temporizing measures, started on bicarb gtt  - renal consulted - plan to start CRRT    # ID   - septic shock 2/2 E-coli bacteremia possibly 2/2 urine vs abdominal pathology  - Urine culture on this admission with pan sensitive E-coli --> will c/w [place]/caspo/vanc (broadened from cefepime)  - will send repeat blood cultures   - COVID negative on this admission, CMVPCR not detected     # DVT PPx:  SCDs     #GOC- ongoing, currently FC  Palliative consulted, appreciate eval

## 2020-05-23 NOTE — PROCEDURE NOTE - SUPERVISORY STATEMENT
No immediate complications.   1st pass intubation.  Patient started on vasopressors prior to procedure to maintain SBP > 100  Patient preoxygenated - no desaturation during intubation
No immediate complications
No immediate complications

## 2020-05-23 NOTE — DISCHARGE NOTE FOR THE EXPIRED PATIENT - HOSPITAL COURSE
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 Patient found to be bicytopenic on admission with a white blood cell count 80K and significant blast count suggestive of relapsed ALL. Patient was initially treated with hydrea, and then gleevec. She had extensive imaging of her brain and spine with evidence of enhancement of the pituitary stalk possibly 2/2 leukemic infiltrate. MRI of the spine with T2 enhancement along the conus ends. Seen by NSGY during this admission -- currently being treated with IT chemotherapy. Course was compounded by GNR bacteremia with RRT on  - - given 1 unit of pRBC and 1 liter of fluid. MICU consulted on  for hypotension which was in the setting of being on dilaudid gtt. Second RRT called on  for hypoxia and hypotension and patient was was started on levophed. Patient was transferred to MICU for septic shock 2/2 E-coli bacteremia, where she was found to be in worsening renal failure requiring bicarbonate gtt and temporizing measures for hyperkalemia. Platelets were given for line placement. Shiley was placed for initiation of CRRT; however, pt developed wide complex rhythm and cardiac arrest x2. ROSC was not sustained after the second attempt and pt developed asystole. She was pronounced  on  at 13:46. Duyen (daughter) was notified.

## 2020-05-23 NOTE — PROGRESS NOTE ADULT - SUBJECTIVE AND OBJECTIVE BOX
HPC Transplant Team                                                      Critical / Counseling Time Provided: 30 minutes                                                                                                                                                        Chief Complaint:   persistent back pain, relapsed disease     S: Patient seen and examined with HPC Transplant Team:   + diffused pain in back, shoulders, hip    Denies any nausea, vomiting, diarrhea, chest pain or SOB.    O: Vitals:   Vital Signs Last 24 Hrs  T(C): 36.6 (23 May 2020 04:15), Max: 38.4 (22 May 2020 10:45)  T(F): 97.9 (23 May 2020 04:15), Max: 101.1 (22 May 2020 10:45)  HR: 123 (23 May 2020 06:08) (78 - 132)  BP: 64/29 (23 May 2020 06:08) (64/29 - 134/83)  BP(mean): 41 (23 May 2020 06:08) (41 - 41)  RR: 38 (23 May 2020 06:08) (15 - 38)  SpO2: 91% (23 May 2020 06:08) (91% - 100%)        Intake / Output:   05-22 @ 07:01  -  05-23 @ 07:00  --------------------------------------------------------  IN: 4530 mL / OUT: 300 mL / NET: 4230 mL    PE:   General: appears in mild distress   CVS: S1, S2 RRR   Lungs: CTA throughout bilaterally   Abdomen: + BS x 4, soft, NT, ND   Extremities: no edema   Skin: no rash   Neuro: A&O x 3  Pain: chronic low back pain          Labs:   CBC Full  -  ( 23 May 2020 01:29 )  WBC Count : <0.10 K/uL  Hemoglobin : 7.5 g/dL  Hematocrit : 23.5 %  Platelet Count - Automated : 16 K/uL  Mean Cell Volume : 95.1 fl  Mean Cell Hemoglobin : 30.4 pg  Mean Cell Hemoglobin Concentration : 31.9 gm/dL  Auto Neutrophil # : x  Auto Lymphocyte # : x  Auto Monocyte # : x  Auto Eosinophil # : x  Auto Basophil # : x  Auto Neutrophil % : x  Auto Lymphocyte % : x  Auto Monocyte % : x  Auto Eosinophil % : x  Auto Basophil % : x                          7.5    <0.10 )-----------( 16       ( 23 May 2020 01:29 )             23.5     05-22    135  |  106  |  30<H>  ----------------------------<  194<H>  4.5   |  17<L>  |  0.86    Ca    9.2      22 May 2020 06:58  Phos  3.1     05-22  Mg     1.9     05-22    TPro  5.5<L>  /  Alb  2.9<L>  /  TBili  1.2  /  DBili  0.7<H>  /  AST  65<H>  /  ALT  153<H>  /  AlkPhos  71  05-22    PT/INR - ( 21 May 2020 09:38 )   PT: 10.5 sec;   INR: 0.92 ratio         PTT - ( 21 May 2020 09:38 )  PTT:21.6 sec  LIVER FUNCTIONS - ( 22 May 2020 06:58 )  Alb: 2.9 g/dL / Pro: 5.5 g/dL / ALK PHOS: 71 U/L / ALT: 153 U/L / AST: 65 U/L / GGT: x                 GI:  lactulose Syrup 10 Gram(s) Oral every 8 hours  pantoprazole    Tablet 40 milliGRAM(s) Oral before breakfast  polyethylene glycol 3350 17 Gram(s) Oral every 12 hours  senna 2 Tablet(s) Oral every 12 hours  simethicone 80 milliGRAM(s) Chew every 6 hours  ursodiol Capsule 300 milliGRAM(s) Oral every 12 hours      Cardiovascular:   furosemide   Injectable 20 milliGRAM(s) IV Push once      Immunologic:       Other medications:   albumin human  5% IVPB 250 milliLiter(s) IV Intermittent once  allopurinol 300 milliGRAM(s) Oral daily  chlorhexidine 4% Liquid 1 Application(s) Topical <User Schedule>  chlorhexidine 4% Liquid 1 Application(s) Topical <User Schedule>  dexAMETHasone  Injectable 4 milliGRAM(s) IV Push every 6 hours  dextrose 50% Injectable 50 milliLiter(s) IV Push once  fentaNYL    Injectable 100 MICROGram(s) IV Push once  fentaNYL    Injectable 50 MICROGram(s) IV Push once  HYDROmorphone  Injectable 0.5 milliGRAM(s) IV Push once  HYDROmorphone  Injectable 0.5 milliGRAM(s) IV Push once  insulin regular  human recombinant. 5 Unit(s) IV Push once  levETIRAcetam 500 milliGRAM(s) Oral two times a day  lidocaine   Patch 1 Patch Transdermal every 24 hours  melatonin 5 milliGRAM(s) Oral at bedtime  midazolam Injectable 2 milliGRAM(s) IV Push once  midazolam Injectable 1 milliGRAM(s) IV Push once  sodium bicarbonate  Infusion 0.144 mEq/kG/Hr IV Continuous <Continuous>  sodium bicarbonate  Injectable 50 milliEquivalent(s) IV Push once  sodium bicarbonate  Injectable 50 milliEquivalent(s) IV Push once  sodium chloride 0.9%. 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   S/p  PICC line placement  on 5/21.  5/22- Methotrexate I/T todasy, f/u flow cyto.  5/23 Hypotension started yesterday and initially responded to 1U PRBC and fluids. Continued to be hypotensive to 63/41 with HR in 130s. Saturating well on RA. MICU consulted and patient transferred for further care.     1. GI Prophylaxis:    pantoprazole    Tablet 40 milliGRAM(s) Oral before breakfast    2. Mouth care, skin care    3. Transfuse & replete electrolytes prn    4. IV hydration, daily weights, strict I&O, prn diuresis     5. PO intake as tolerated, nutrition follow up as needed, MVI, folic acid     6. Antiemetics, anti-diarrhea medications:     7. OOB as tolerated, physical therapy consult if needed     8. Monitor coags / fibrinogen 2x week, vitamin K as needed     9. Monitor closely for clinical changes, monitor for fevers     10. Emotional support provided, plan of care discussed with patient and family, questions addressed     11. Patient education done regarding  plan of care, restrictions and discharge planning     12. Continue regular social work input     I have written the above note for Dr. Logan who performed service with me in the room.   Nataliya Martinez  NP-C ([phone])    I have seen and examined patient with NP, I agree with above note as scribed. HPC Transplant Team                                                      Critical / Counseling Time Provided: 30 minutes                                                                                                                                                        Chief Complaint:   persistent back pain, relapsed disease     S: Patient seen and examined with [facility] Transplant Team:   Intubated and sedated.       O: Vitals:   Vital Signs Last 24 Hrs  T(C): 36.6 (23 May 2020 04:15), Max: 38.4 (22 May 2020 10:45)  T(F): 97.9 (23 May 2020 04:15), Max: 101.1 (22 May 2020 10:45)  HR: 123 (23 May 2020 06:08) (78 - 132)  BP: 64/29 (23 May 2020 06:08) (64/29 - 134/83)  BP(mean): 41 (23 May 2020 06:08) (41 - 41)  RR: 38 (23 May 2020 06:08) (15 - 38)  SpO2: 91% (23 May 2020 06:08) (91% - 100%)        Intake / Output:   05-22 @ 07:01  -  05-23 @ 07:00  --------------------------------------------------------  IN: 4530 mL / OUT: 300 mL / NET: 4230 mL    PE-Per primary team.       Labs:   CBC Full  -  ( 23 May 2020 01:29 )  WBC Count : <0.10 K/uL  Hemoglobin : 7.5 g/dL  Hematocrit : 23.5 %  Platelet Count - Automated : 16 K/uL  Mean Cell Volume : 95.1 fl  Mean Cell Hemoglobin : 30.4 pg  Mean Cell Hemoglobin Concentration : 31.9 gm/dL  Auto Neutrophil # : x  Auto Lymphocyte # : x  Auto Monocyte # : x  Auto Eosinophil # : x  Auto Basophil # : x  Auto Neutrophil % : x  Auto Lymphocyte % : x  Auto Monocyte % : x  Auto Eosinophil % : x  Auto Basophil % : x                          7.5    <0.10 )-----------( 16       ( 23 May 2020 01:29 )             23.5     05-22    135  |  106  |  30<H>  ----------------------------<  194<H>  4.5   |  17<L>  |  0.86    Ca    9.2      22 May 2020 06:58  Phos  3.1     05-22  Mg     1.9     05-22    TPro  5.5<L>  /  Alb  2.9<L>  /  TBili  1.2  /  DBili  0.7<H>  /  AST  65<H>  /  ALT  153<H>  /  AlkPhos  71  05-22    PT/INR - ( 21 May 2020 09:38 )   PT: 10.5 sec;   INR: 0.92 ratio         PTT - ( 21 May 2020 09:38 )  PTT:21.6 sec  LIVER FUNCTIONS - ( 22 May 2020 06:58 )  Alb: 2.9 g/dL / Pro: 5.5 g/dL / ALK PHOS: 71 U/L / ALT: 153 U/L / AST: 65 U/L / GGT: x                 GI:  lactulose Syrup 10 Gram(s) Oral every 8 hours  pantoprazole    Tablet 40 milliGRAM(s) Oral before breakfast  polyethylene glycol 3350 17 Gram(s) Oral every 12 hours  senna 2 Tablet(s) Oral every 12 hours  simethicone 80 milliGRAM(s) Chew every 6 hours  ursodiol Capsule 300 milliGRAM(s) Oral every 12 hours      Cardiovascular:   furosemide   Injectable 20 milliGRAM(s) IV Push once      Immunologic:       Other medications:   albumin human  5% IVPB 250 milliLiter(s) IV Intermittent once  allopurinol 300 milliGRAM(s) Oral daily  chlorhexidine 4% Liquid 1 Application(s) Topical <User Schedule>  chlorhexidine 4% Liquid 1 Application(s) Topical <User Schedule>  dexAMETHasone  Injectable 4 milliGRAM(s) IV Push every 6 hours  dextrose 50% Injectable 50 milliLiter(s) IV Push once  fentaNYL    Injectable 100 MICROGram(s) IV Push once  fentaNYL    Injectable 50 MICROGram(s) IV Push once  HYDROmorphone  Injectable 0.5 milliGRAM(s) IV Push once  HYDROmorphone  Injectable 0.5 milliGRAM(s) IV Push once  insulin regular  human recombinant. 5 Unit(s) IV Push once  levETIRAcetam 500 milliGRAM(s) Oral two times a day  lidocaine   Patch 1 Patch Transdermal every 24 hours  melatonin 5 milliGRAM(s) Oral at bedtime  midazolam Injectable 2 milliGRAM(s) IV Push once  midazolam Injectable 1 milliGRAM(s) IV Push once  sodium bicarbonate  Infusion 0.144 mEq/kG/Hr IV Continuous <Continuous>  sodium bicarbonate  Injectable 50 milliEquivalent(s) IV Push once  sodium bicarbonate  Injectable 50 milliEquivalent(s) IV Push once  sodium chloride 0.9%. 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   S/p  PICC line placement  on 5/21.  5/22- s/p Methotrexate IT via [facility]. Plan for weekly LP via [facility]. ? Alternate MTX/Delmy-C-?Hold now due to critical illness.   5/23 Hypotension started yesterday and initially responded to 1U PRBC and fluids. Continued to be hypotensive to 63/41 with HR in 130s. Saturating well on RA. MICU consulted and patient transferred for further care.   5/23 Plan for weekly LP via [facility]. ? Alternate MTX/Delmy-C- ?Hold now due to critical illness.      1. GI Prophylaxis:    pantoprazole    Tablet 40 milliGRAM(s) Oral before breakfast    2. Mouth care, skin care    3. Transfuse & replete electrolytes prn    4. IV hydration, daily weights, strict I&O, prn diuresis     5. PO intake as tolerated, nutrition follow up as needed, MVI, folic acid     6. Antiemetics, anti-diarrhea medications:     7. OOB as tolerated, physical therapy consult if needed     8. Monitor coags / fibrinogen 2x week, vitamin K as needed     9. Monitor closely for clinical changes, monitor for fevers     10. Emotional support provided, plan of care discussed with patient and family, questions addressed     11. Patient education done regarding  plan of care, restrictions and discharge planning     12. Continue regular social work input     I have written the above note for Dr. Logan who performed service with me in the room.   Nataliya Martinez  NP-C ([phone])    I have seen and examined patient with NP, I agree with above note as scribed. HPC Transplant Team                                                      Critical / Counseling Time Provided: 30 minutes                                                                                                                                                        Chief Complaint:   persistent back pain, relapsed disease     S: Patient seen and examined with [facility] Transplant Team:   Intubated and sedated.       O: Vitals:   Vital Signs Last 24 Hrs  T(C): 36.6 (23 May 2020 04:15), Max: 38.4 (22 May 2020 10:45)  T(F): 97.9 (23 May 2020 04:15), Max: 101.1 (22 May 2020 10:45)  HR: 123 (23 May 2020 06:08) (78 - 132)  BP: 64/29 (23 May 2020 06:08) (64/29 - 134/83)  BP(mean): 41 (23 May 2020 06:08) (41 - 41)  RR: 38 (23 May 2020 06:08) (15 - 38)  SpO2: 91% (23 May 2020 06:08) (91% - 100%)        Intake / Output:   05-22 @ 07:01  -  05-23 @ 07:00  --------------------------------------------------------  IN: 4530 mL / OUT: 300 mL / NET: 4230 mL    PE-Per primary team.       Labs:   CBC Full  -  ( 23 May 2020 01:29 )  WBC Count : <0.10 K/uL  Hemoglobin : 7.5 g/dL  Hematocrit : 23.5 %  Platelet Count - Automated : 16 K/uL  Mean Cell Volume : 95.1 fl  Mean Cell Hemoglobin : 30.4 pg  Mean Cell Hemoglobin Concentration : 31.9 gm/dL  Auto Neutrophil # : x  Auto Lymphocyte # : x  Auto Monocyte # : x  Auto Eosinophil # : x  Auto Basophil # : x  Auto Neutrophil % : x  Auto Lymphocyte % : x  Auto Monocyte % : x  Auto Eosinophil % : x  Auto Basophil % : x                          7.5    <0.10 )-----------( 16       ( 23 May 2020 01:29 )             23.5     05-22    135  |  106  |  30<H>  ----------------------------<  194<H>  4.5   |  17<L>  |  0.86    Ca    9.2      22 May 2020 06:58  Phos  3.1     05-22  Mg     1.9     05-22    TPro  5.5<L>  /  Alb  2.9<L>  /  TBili  1.2  /  DBili  0.7<H>  /  AST  65<H>  /  ALT  153<H>  /  AlkPhos  71  05-22    PT/INR - ( 21 May 2020 09:38 )   PT: 10.5 sec;   INR: 0.92 ratio         PTT - ( 21 May 2020 09:38 )  PTT:21.6 sec  LIVER FUNCTIONS - ( 22 May 2020 06:58 )  Alb: 2.9 g/dL / Pro: 5.5 g/dL / ALK PHOS: 71 U/L / ALT: 153 U/L / AST: 65 U/L / GGT: x                 GI:  lactulose Syrup 10 Gram(s) Oral every 8 hours  pantoprazole    Tablet 40 milliGRAM(s) Oral before breakfast  polyethylene glycol 3350 17 Gram(s) Oral every 12 hours  senna 2 Tablet(s) Oral every 12 hours  simethicone 80 milliGRAM(s) Chew every 6 hours  ursodiol Capsule 300 milliGRAM(s) Oral every 12 hours      Cardiovascular:   furosemide   Injectable 20 milliGRAM(s) IV Push once      Immunologic:       Other medications:   albumin human  5% IVPB 250 milliLiter(s) IV Intermittent once  allopurinol 300 milliGRAM(s) Oral daily  chlorhexidine 4% Liquid 1 Application(s) Topical <User Schedule>  chlorhexidine 4% Liquid 1 Application(s) Topical <User Schedule>  dexAMETHasone  Injectable 4 milliGRAM(s) IV Push every 6 hours  dextrose 50% Injectable 50 milliLiter(s) IV Push once  fentaNYL    Injectable 100 MICROGram(s) IV Push once  fentaNYL    Injectable 50 MICROGram(s) IV Push once  HYDROmorphone  Injectable 0.5 milliGRAM(s) IV Push once  HYDROmorphone  Injectable 0.5 milliGRAM(s) IV Push once  insulin regular  human recombinant. 5 Unit(s) IV Push once  levETIRAcetam 500 milliGRAM(s) Oral two times a day  lidocaine   Patch 1 Patch Transdermal every 24 hours  melatonin 5 milliGRAM(s) Oral at bedtime  midazolam Injectable 2 milliGRAM(s) IV Push once  midazolam Injectable 1 milliGRAM(s) IV Push once  sodium bicarbonate  Infusion 0.144 mEq/kG/Hr IV Continuous <Continuous>  sodium bicarbonate  Injectable 50 milliEquivalent(s) IV Push once  sodium bicarbonate  Injectable 50 milliEquivalent(s) IV Push once  sodium chloride 0.9%. 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   S/p  PICC line placement  on 5/21.  5/22- s/p Methotrexate IT via [facility]. Plan for weekly LP via [facility]. ? Alternate MTX/Delmy-C-?Hold now due to critical illness.   5/23 Hypotension started yesterday and initially responded to 1U PRBC and fluids. Continued to be hypotensive to 63/41 with HR in 130s. Saturating well on RA. MICU consulted and patient transferred for further care.   5/23 Plan for biweekly LP tap via [facility] (tues/Fri).  and ? Alternate MTX/Delmy-C- Now on Hold now due to critical illness- Will reassess on Tues 5/26. Hold [facility]_ Patient intubated. Can not be crushed.     1. GI Prophylaxis:    pantoprazole    Tablet 40 milliGRAM(s) Oral before breakfast    2. Mouth care, skin care    3. Transfuse & replete electrolytes prn    4. IV hydration, daily weights, strict I&O, prn diuresis     5. PO intake as tolerated, nutrition follow up as needed, MVI, folic acid     6. Antiemetics, anti-diarrhea medications:     7. OOB as tolerated, physical therapy consult if needed     8. Monitor coags / fibrinogen 2x week, vitamin K as needed     9. Monitor closely for clinical changes, monitor for fevers     10. Emotional support provided, plan of care discussed with patient and family, questions addressed     11. Patient education done regarding  plan of care, restrictions and discharge planning     12. Continue regular social work input     I have written the above note for Dr. Logan who performed service with me in the room.   Nataliya Martinez  NP-C ([phone])    I have seen and examined patient with NP, I agree with above note as scribed.

## 2020-05-23 NOTE — CONSULT NOTE ADULT - ASSESSMENT
Pt with  NERI and Acidosis.     #NERI  Pt with NERI in the setting of septic shock, now hypotensive on IV vasopressors. NERI likely ATN. Pt with normal scr of 0.8 now 2.2, pt was placed on pressors, planned to start CRRT however pt .     #Hyperkalemia  In the setting of Acidosis, NERI, treated medically, was planned to start CRRT but pt .     #Acidosis  In the setting on NREI and HAGMA was on bicarb drip. Pt with  NERI and profound Acidosis.     #NERI  Pt with NERI in the setting of septic shock, now hypotensive on IV vasopressors. NERI likely ATN. Pt with normal scr of 0.8 now 2.2, pt was placed on pressors, planned to start CRRT however pt .     #Hyperkalemia  In the setting of Acidosis, NERI, treated medically, was planned to start CRRT but pt .     #Acidosis  In the setting on NERI and HAGMA was on bicarb drip.

## 2020-05-23 NOTE — PROGRESS NOTE ADULT - SUBJECTIVE AND OBJECTIVE BOX
SUBJECTIVE AND OBJECTIVE:  INTERVAL HPI/OVERNIGHT EVENTS:    DNR on chart:   Allergies    No Known Drug Allergies  shellfish (Nausea; Vomiting)    Intolerances    MEDICATIONS  (STANDING):  albumin human  5% IVPB 250 milliLiter(s) IV Intermittent once  allopurinol 300 milliGRAM(s) Oral daily  caspofungin IVPB      caspofungin IVPB 50 milliGRAM(s) IV Intermittent every 24 hours  cefepime   IVPB 2000 milliGRAM(s) IV Intermittent every 8 hours  cefepime   IVPB      chlorhexidine 4% Liquid 1 Application(s) Topical <User Schedule>  chlorhexidine 4% Liquid 1 Application(s) Topical <User Schedule>  cytarabine PF IntraThecal (eMAR) 50 milliGRAM(s) IntraThecal once  dexAMETHasone  Injectable 4 milliGRAM(s) IV Push every 6 hours  dextrose 50% Injectable 50 milliLiter(s) IV Push once  fentaNYL    Injectable 100 MICROGram(s) IV Push once  fentaNYL    Injectable 50 MICROGram(s) IV Push once  furosemide   Injectable 20 milliGRAM(s) IV Push once  HYDROmorphone  Injectable 0.5 milliGRAM(s) IV Push once  HYDROmorphone  Injectable 0.5 milliGRAM(s) IV Push once  imatinib 400 milliGRAM(s) Oral two times a day  insulin regular  human recombinant. 5 Unit(s) IV Push once  lactulose Syrup 10 Gram(s) Oral every 8 hours  levETIRAcetam 500 milliGRAM(s) Oral two times a day  lidocaine   Patch 1 Patch Transdermal every 24 hours  melatonin 5 milliGRAM(s) Oral at bedtime  methotrexate PF IntraThecal (eMAR) 12 milliGRAM(s) IntraThecal once  methotrexate PF IntraThecal (eMAR) 12 milliGRAM(s) IntraThecal once  midazolam Injectable 2 milliGRAM(s) IV Push once  midazolam Injectable 1 milliGRAM(s) IV Push once  pantoprazole    Tablet 40 milliGRAM(s) Oral before breakfast  polyethylene glycol 3350 17 Gram(s) Oral every 12 hours  senna 2 Tablet(s) Oral every 12 hours  simethicone 80 milliGRAM(s) Chew every 6 hours  sodium bicarbonate  Infusion 0.144 mEq/kG/Hr (100 mL/Hr) IV Continuous <Continuous>  sodium bicarbonate  Injectable 50 milliEquivalent(s) IV Push once  sodium bicarbonate  Injectable 50 milliEquivalent(s) IV Push once  sodium chloride 0.9%. 500 milliLiter(s) (75 mL/Hr) IV Continuous <Continuous>  ursodiol Capsule 300 milliGRAM(s) Oral every 12 hours  vancomycin  IVPB 1000 milliGRAM(s) IV Intermittent every 12 hours    MEDICATIONS  (PRN):  methyl salicylate 15%/menthol 10% Topical Cream 1 Application(s) Topical three times a day PRN Pain  naloxone Injectable 0.1 milliGRAM(s) IV Push every 3 minutes PRN for sedation or RR < 10  ondansetron Injectable 8 milliGRAM(s) IV Push every 8 hours PRN Nausea and/or Vomiting  sodium chloride 0.9% lock flush 10 milliLiter(s) IV Push every 1 hour PRN Pre/post blood products, medications, blood draw, and to maintain line patency  sodium chloride 0.9% lock flush 10 milliLiter(s) IV Push every 1 hour PRN Pre/post blood products, medications, blood draw, and to maintain line patency      ITEMS UNCHECKED ARE NOT PRESENT    PRESENT SYMPTOMS: [ ]Unable to obtain due to poor mentation   Source if other than patient:  [ ]Family   [ ]Team     Pain (Impact on QOL):    Location:  Minimal acceptable level (0-10 scale):                   Aggravating factors:  Quality:  Radiation:  Severity (0-10 scale):    Timing:    Dyspnea:                           [ ]Mild [ ]Moderate [ ]Severe  Anxiety:                             [ ]Mild [ ]Moderate [ ]Severe  Fatigue:                             [ ]Mild [ ]Moderate [ ]Severe  Nausea:                             [ ]Mild [ ]Moderate [ ]Severe  Loss of appetite:              [ ]Mild [ ]Moderate [ ]Severe  Constipation:                    [ ]Mild [ ]Moderate [ ]Severe    PAIN AD Score:	  [email or web address] (Ctrl + left click to view)    Other Symptoms:  [ ]All other review of systems negative     Karnofsky Performance Score/Palliative Performance Status Version 2:         %    http://palliative.info/resource_material/[email or web address].pdf  PHYSICAL EXAM:  Vital Signs Last 24 Hrs  T(C): 36.6 (23 May 2020 04:15), Max: 38.4 (22 May 2020 10:45)  T(F): 97.9 (23 May 2020 04:15), Max: 101.1 (22 May 2020 10:45)  HR: 123 (23 May 2020 06:08) (78 - 132)  BP: 64/29 (23 May 2020 06:08) (64/29 - 134/83)  BP(mean): 41 (23 May 2020 06:08) (41 - 41)  RR: 38 (23 May 2020 06:08) (15 - 38)  SpO2: 91% (23 May 2020 06:08) (91% - 100%) I&O's Summary    22 May 2020 07:01  -  23 May 2020 07:00  --------------------------------------------------------  IN: 4530 mL / OUT: 300 mL / NET: 4230 mL     GENERAL:  [ ]Alert  [ ]Oriented x   [ ]Lethargic  [ ]Cachexia  [ ]Unarousable  [ ]Verbal  [ ]Non-Verbal  Behavioral:   [ ] Anxiety  [ ] Delirium [ ] Agitation [ ] Other  HEENT:  [ ]Normal   [ ]Dry mouth   [ ]ET Tube/Trach  [ ]Oral lesions  PULMONARY:   [ ]Clear [ ]Tachypnea  [ ]Audible excessive secretions   [ ]Rhonchi        [ ]Right [ ]Left [ ]Bilateral  [ ]Crackles        [ ]Right [ ]Left [ ]Bilateral  [ ]Wheezing     [ ]Right [ ]Left [ ]Bilateral  CARDIOVASCULAR:    [ ]Regular [ ]Irregular [ ]Tachy  [ ]Ellsi [ ]Murmur [ ]Other  GASTROINTESTINAL:  [ ]Soft  [ ]Distended   [ ]+BS  [ ]Non tender [ ]Tender  [ ]PEG [ ]OGT/ NGT   Last BM:   20 @ 07:  -  20 @ 07:00  --------------------------------------------------------  OUT: 300 mL    20 @ 07:  -  20 @ 07:00  --------------------------------------------------------  OUT: 300 mL    20 @ 07:01  -  20 @ 07:00  --------------------------------------------------------  OUT: 250 mL     GENITOURINARY:  [ ]Normal [ ] Incontinent   [ ]Oliguria/Anuria   [ ]Deng  MUSCULOSKELETAL:   [ ]Normal   [ ]Weakness  [ ]Bed/Wheelchair bound [ ]Edema  NEUROLOGIC:   [ ]No focal deficits  [ ] Cognitive impairment  [ ] Dysphagia [ ]Dysarthria [ ] Paresis [ ]Other   SKIN:   [ ]Normal   [ ]Pressure ulcer(s)  [ ]Rash    CRITICAL CARE:  [ ] Shock Present  [ ]Septic [ ]Cardiogenic [ ]Neurologic [ ]Hypovolemic  [ ]  Vasopressors [ ]  Inotropes   [ ] Respiratory failure present  [ ] Acute  [ ] Chronic [ ] Hypoxic  [ ] Hypercarbic [ ] Other  [ ] Other organ failure     LABS:                        7.5    <0.10 )-----------( 16       ( 23 May 2020 01:29 )             23.5   05-    135  |  106  |  30<H>  ----------------------------<  194<H>  4.5   |  17<L>  |  0.86    Ca    9.2      22 May 2020 06:58  Phos  3.1       Mg     1.9         TPro  5.5<L>  /  Alb  2.9<L>  /  TBili  1.2  /  DBili  0.7<H>  /  AST  65<H>  /  ALT  153<H>  /  AlkPhos  71  05-22  PT/INR - ( 21 May 2020 09:38 )   PT: 10.5 sec;   INR: 0.92 ratio         PTT - ( 21 May 2020 09:38 )  PTT:21.6 sec    Urinalysis Basic - ( 23 May 2020 04:35 )    Color: Lilia / Appearance: Slightly Turbid / S.025 / pH: x  Gluc: x / Ketone: Trace  / Bili: Negative / Urobili: <2 mg/dL   Blood: x / Protein: 30 mg/dL / Nitrite: Negative   Leuk Esterase: Negative / RBC: 0 /HPF / WBC 5 /HPF   Sq Epi: x / Non Sq Epi: 6 /HPF / Bacteria: Negative      RADIOLOGY & ADDITIONAL STUDIES:    Protein Calorie Malnutrition Present: [ ] yes [ ] no  [ ] PPSV2 < or = 30%  [ ] significant weight loss [ ] poor nutritional intake [ ] anasarca [ ] catabolic state Albumin, Serum: 2.9 g/dL (20 @ 06:58)  Artificial Nutrition [ ]     REFERRALS:   [ ]Chaplaincy  [ ] Hospice  [ ]Child Life  [ ]Social Work  [ ]Case management [ ]Holistic Therapy     Goals of Care Document:  ANNETTA Peña (20 @ 14:14)  Goals of Care Conversation:   Participants:  · Participants  Patient    Advance Directives:  · Does patient have Advance Directive  No  · Do you want to complete the HCP and name a Franco Care Agent  No    Conversation Discussion:  · Conversation Details  I spoke with the patient about HCP assignment.  I inquired if they had filled out a health care proxy form in the past  Next, [facility] explained [facility] for [facility] decision maker selection  I highlighted the impact of not assigning a HCP  She would like her daughter in law Germaine to be her HCP  Questions were answered to their satisfaction  d/w SW to complete HCP form    Time spent >16 min      Electronic Signatures:  Parminder Peña)  (Signed 19-May-2020 14:15)  	Authored: Goals of Care Conversation      Last Updated: 19-May-2020 14:15 by Parminder Peña) INTERVAL HPI/OVERNIGHT EVENTS:  Patient RRT and transferred to MICU 2/2 hypoxia and hypotension, now requiring pressors, intubated and vented . Plan for CRRT/CVVHD today  Received a total of 1.1 mg of Dilaudid IVP overnight.       DNR on chart:   Allergies    No Known Drug Allergies  shellfish (Nausea; Vomiting)    Intolerances    MEDICATIONS  (STANDING):  albumin human  5% IVPB 250 milliLiter(s) IV Intermittent once  allopurinol 300 milliGRAM(s) Oral daily  caspofungin IVPB      caspofungin IVPB 50 milliGRAM(s) IV Intermittent every 24 hours  cefepime   IVPB 2000 milliGRAM(s) IV Intermittent every 8 hours  cefepime   IVPB      chlorhexidine 4% Liquid 1 Application(s) Topical <User Schedule>  chlorhexidine 4% Liquid 1 Application(s) Topical <User Schedule>  cytarabine PF IntraThecal (eMAR) 50 milliGRAM(s) IntraThecal once  dexAMETHasone  Injectable 4 milliGRAM(s) IV Push every 6 hours  dextrose 50% Injectable 50 milliLiter(s) IV Push once  fentaNYL    Injectable 100 MICROGram(s) IV Push once  fentaNYL    Injectable 50 MICROGram(s) IV Push once  furosemide   Injectable 20 milliGRAM(s) IV Push once  HYDROmorphone  Injectable 0.5 milliGRAM(s) IV Push once  HYDROmorphone  Injectable 0.5 milliGRAM(s) IV Push once  imatinib 400 milliGRAM(s) Oral two times a day  insulin regular  human recombinant. 5 Unit(s) IV Push once  lactulose Syrup 10 Gram(s) Oral every 8 hours  levETIRAcetam 500 milliGRAM(s) Oral two times a day  lidocaine   Patch 1 Patch Transdermal every 24 hours  melatonin 5 milliGRAM(s) Oral at bedtime  methotrexate PF IntraThecal (eMAR) 12 milliGRAM(s) IntraThecal once  methotrexate PF IntraThecal (eMAR) 12 milliGRAM(s) IntraThecal once  midazolam Injectable 2 milliGRAM(s) IV Push once  midazolam Injectable 1 milliGRAM(s) IV Push once  pantoprazole    Tablet 40 milliGRAM(s) Oral before breakfast  polyethylene glycol 3350 17 Gram(s) Oral every 12 hours  senna 2 Tablet(s) Oral every 12 hours  simethicone 80 milliGRAM(s) Chew every 6 hours  sodium bicarbonate  Infusion 0.144 mEq/kG/Hr (100 mL/Hr) IV Continuous <Continuous>  sodium bicarbonate  Injectable 50 milliEquivalent(s) IV Push once  sodium bicarbonate  Injectable 50 milliEquivalent(s) IV Push once  sodium chloride 0.9%. 500 milliLiter(s) (75 mL/Hr) IV Continuous <Continuous>  ursodiol Capsule 300 milliGRAM(s) Oral every 12 hours  vancomycin  IVPB 1000 milliGRAM(s) IV Intermittent every 12 hours    MEDICATIONS  (PRN):  methyl salicylate 15%/menthol 10% Topical Cream 1 Application(s) Topical three times a day PRN Pain  naloxone Injectable 0.1 milliGRAM(s) IV Push every 3 minutes PRN for sedation or RR < 10  ondansetron Injectable 8 milliGRAM(s) IV Push every 8 hours PRN Nausea and/or Vomiting  sodium chloride 0.9% lock flush 10 milliLiter(s) IV Push every 1 hour PRN Pre/post blood products, medications, blood draw, and to maintain line patency  sodium chloride 0.9% lock flush 10 milliLiter(s) IV Push every 1 hour PRN Pre/post blood products, medications, blood draw, and to maintain line patency      ITEMS UNCHECKED ARE NOT PRESENT    PRESENT SYMPTOMS: [x ]Unable to obtain due to poor mentation   Source if other than patient:  [ ]Family   [ ]Team     Pain (Impact on QOL):    Location:  Minimal acceptable level (0-10 scale):                   Aggravating factors:  Quality:  Radiation:  Severity (0-10 scale):    Timing:    Dyspnea:                           [ ]Mild [ ]Moderate [ ]Severe  Anxiety:                             [ ]Mild [ ]Moderate [ ]Severe  Fatigue:                             [ ]Mild [ ]Moderate [ ]Severe  Nausea:                             [ ]Mild [ ]Moderate [ ]Severe  Loss of appetite:              [ ]Mild [ ]Moderate [ ]Severe  Constipation:                    [ ]Mild [ ]Moderate [ ]Severe    PAIN AD Score:	  [email or web address] (Ctrl + left click to view)    Other Symptoms:  [ ]All other review of systems negative     Karnofsky Performance Score/Palliative Performance Status Version 2:        20 %    http://palliative.info/resource_material/[email or web address].pdf  PHYSICAL EXAM:  Vital Signs Last 24 Hrs  T(C): 36.6 (23 May 2020 04:15), Max: 38.4 (22 May 2020 10:45)  T(F): 97.9 (23 May 2020 04:15), Max: 101.1 (22 May 2020 10:45)  HR: 123 (23 May 2020 06:08) (78 - 132)  BP: 64/29 (23 May 2020 06:08) (64/29 - 134/83)  BP(mean): 41 (23 May 2020 06:08) (41 - 41)  RR: 38 (23 May 2020 06:08) (15 - 38)  SpO2: 91% (23 May 2020 06:08) (91% - 100%) I&O's Summary    22 May 2020 07:01  -  23 May 2020 07:00  --------------------------------------------------------  IN: 4530 mL / OUT: 300 mL / NET: 4230 mL     GENERAL:  [ ]Alert  [ ]Oriented x   [ ]Lethargic  [ ]Cachexia  [ ]Unarousable  [ ]Verbal  [ ]Non-Verbal  [x] sedated  Behavioral:   [ ] Anxiety  [ ] Delirium [ ] Agitation [ ] Other  HEENT:  [ ]Normal   [ ]Dry mouth   [x ]ET Tube/Trach  [ ]Oral lesions  PULMONARY:   [ x]Clear [ ]Tachypnea  [ ]Audible excessive secretions   [ ]Rhonchi        [ ]Right [ ]Left [ ]Bilateral  [ ]Crackles        [ ]Right [ ]Left [ ]Bilateral  [ ]Wheezing     [ ]Right [ ]Left [ ]Bilateral  CARDIOVASCULAR:    [x ]Regular [ ]Irregular [ ]Tachy  [ ]Ellis [ ]Murmur [ ]Other  GASTROINTESTINAL:  [x ]Soft  [ x]Distended   [x ]+BS  [ ]Non tender [ ]Tender  [ ]PEG [ ]OGT/ NGT   Last BM:   20 @ 07:01  -  20 @ 07:00  --------------------------------------------------------  OUT: 300 mL    20 @ 07:01  -  20 @ 07:00  --------------------------------------------------------  OUT: 300 mL    20 @ 07:01  -  20 @ 07:00  --------------------------------------------------------  OUT: 250 mL     GENITOURINARY:  [ ]Normal [ ] Incontinent   [ ]Oliguria/Anuria   [ ]Deng  MUSCULOSKELETAL:   [ ]Normal   [ ]Weakness  [x ]Bed/Wheelchair bound [ ]Edema  NEUROLOGIC:   [ ]No focal deficits  [ ] Cognitive impairment  [ ] Dysphagia [ ]Dysarthria [ ] Paresis [ ]Other   SKIN:   [ x]Normal   [ ]Pressure ulcer(s)  [ ]Rash    CRITICAL CARE:  [ x] Shock Present  [ x]Septic [ ]Cardiogenic [ ]Neurologic [ ]Hypovolemic  [ ]  Vasopressors [ ]  Inotropes   [x ] Respiratory failure present [ x]Mechanical ventilation  [x ] Acute  [ ] Chronic [x ] Hypoxic  [ ] Hypercarbic [ ] Other  [ ] Other organ failure     LABS:                        7.5    <0.10 )-----------( 16       ( 23 May 2020 01:29 )             23.5   -    135  |  106  |  30<H>  ----------------------------<  194<H>  4.5   |  17<L>  |  0.86    Ca    9.2      22 May 2020 06:58  Phos  3.1       Mg     1.9         TPro  5.5<L>  /  Alb  2.9<L>  /  TBili  1.2  /  DBili  0.7<H>  /  AST  65<H>  /  ALT  153<H>  /  AlkPhos  71  -22  PT/INR - ( 21 May 2020 09:38 )   PT: 10.5 sec;   INR: 0.92 ratio         PTT - ( 21 May 2020 09:38 )  PTT:21.6 sec    Urinalysis Basic - ( 23 May 2020 04:35 )    Color: Lilia / Appearance: Slightly Turbid / S.025 / pH: x  Gluc: x / Ketone: Trace  / Bili: Negative / Urobili: <2 mg/dL   Blood: x / Protein: 30 mg/dL / Nitrite: Negative   Leuk Esterase: Negative / RBC: 0 /HPF / WBC 5 /HPF   Sq Epi: x / Non Sq Epi: 6 /HPF / Bacteria: Negative          EXAM:  XR CHEST PORTABLE URGENT 1V                        PROCEDURE DATE:  2020      FINDINGS:  The heart size is enlarged.  Low lung volumes. Slightly increased hazy opacity of the left lung. No evidence of pleural effusion or pneumothorax.  Partially imaged enlarged gastric bubble.  Degenerative changes of the spine.    IMPRESSION:  1.  Slightly increased hazy opacity of the left lung.  2.  Partially imaged enlarged gastric bubble.          Protein Calorie Malnutrition Present: [ ] yes [ ] no  [x ] PPSV2 < or = 30%  [ ] significant weight loss [ ] poor nutritional intake [ ] anasarca [ ] catabolic state Albumin, Serum: 2.9 g/dL (20 @ 06:58)  Artificial Nutrition [ ]     REFERRALS:   [ ]Chaplaincy  [ ] Hospice  [ ]Child Life  [ ]Social Work  [ ]Case management [ ]Holistic Therapy     Goals of Care Document:  ANNETTA Peña (20 @ 14:14)  Goals of Care Conversation:   Participants:  · Participants  Patient    Advance Directives:  · Does patient have Advance Directive  No  · Do you want to complete the HCP and name a Franco Care Agent  No    Conversation Discussion:  · Conversation Details  I spoke with the patient about HCP assignment.  I inquired if they had filled out a health care proxy form in the past  Next, I explained the [facility] for surrogate decision maker selection  I highlighted the impact of not assigning a HCP  She would like her daughter in law Germaine to be her HCP  Questions were answered to their satisfaction  d/w SW to complete HCP form    Time spent >16 min      Electronic Signatures:  Parminder Peña)  (Signed 19-May-2020 14:15)  	Authored: Goals of Care Conversation      Last Updated: 19-May-2020 14:15 by Parminder Peña)

## 2020-05-23 NOTE — PROGRESS NOTE ADULT - ATTENDING COMMENTS
Patient with ALL getting treatment, now s/p RRT with organ dysfunction and septic shock.  We were following for pain.  Continue fentanyl infusion and titrate to symptoms.  Can use dilaudid or fentanyl pushes for breakthrough sx. Prognosis is grim.  ICU team to speak with family.

## 2020-05-23 NOTE — PROGRESS NOTE ADULT - PROBLEM SELECTOR PLAN 1
Diffuse / Generalized Pain 2/2 ALL relapse    Received Dilaudid total of 1.1 mg ivp  /24 hours  on Fentanyl infusion at 0.5 mcg/kg/hr  C/w bowel regimen  Avoid BZD/hypnotics/respiratory depressants Diffuse / Generalized Pain 2/2 ALL relapse    Received Dilaudid total of 1.1 mg ivp  /24 hours  on Fentanyl infusion at 0.5 mcg/kg/hr  C/w bowel regimen  Patient in extremis - can continue fentanyl infusion for pain with PRN dilaudid IV for incident pain.

## 2020-05-23 NOTE — PROCEDURE NOTE - NSPROCDETAILS_GEN_ALL_CORE
patient pre-oxygenated, tube inserted, placement confirmed/connected to ventilator
location identified, draped/prepped, sterile technique used/sterile dressing applied/supine position/ultrasound guidance/sterile technique, catheter placed/ultrasound assessment
lumen(s) aspirated and flushed/ultrasound guidance/sterile technique, catheter placed/guidewire recovered/sterile dressing applied
ultrasound guidance/location identified, draped/prepped, sterile technique used, needle inserted/introduced/connected to a pressurized flush line/all materials/supplies accounted for at end of procedure/sutured in place/positive blood return obtained via catheter/hemostasis with direct pressure, dressing applied/Seldinger technique
lumen(s) aspirated and flushed/sterile dressing applied/sterile technique, catheter placed/ultrasound guidance/guidewire recovered

## 2020-05-23 NOTE — PROGRESS NOTE ADULT - PROBLEM SELECTOR PLAN 2
Septic shock 2/2 to e.coli bacteremia  CXray: Slightly increased hazy opacity of the left lung. Partially imaged enlarged gastric bubble.  Continue as per primary team Septic shock 2/2 to e.coli bacteremia, now in MICU in critical condition.

## 2020-05-23 NOTE — PROCEDURE NOTE - NSPOSTPRCRAD_GEN_A_CORE
post-procedure radiography performed/no pneumothorax/Bilateral anterior lung sliding on ultrasound post-procedure
fluoroscopy/line adjusted to depth of insertion
post-procedure radiography performed

## 2020-05-23 NOTE — CONSULT NOTE ADULT - ATTENDING COMMENTS
NERI with hyperkalemia/  metabolic acidosis in the setting of shock  Insulin/Ca/bicarbonate for shifting potassium  To start CVVHDF once catheter is in  Remains very sick with a guarded prognosis

## 2020-05-23 NOTE — PROGRESS NOTE ADULT - REASON FOR ADMISSION
numbness and weakness

## 2020-05-23 NOTE — PROGRESS NOTE ADULT - ASSESSMENT
Patient is a 61 y/o female with a PMHX of Ph+ ALL  s/p halpo-identical SCT in 2019 and chronic arthritic back pain initially presented on May 12th with acute on chronic low back pain and profound b/l LE weakness, found with leucocytosis and pancytopenia concerning for relapse ALL, admitted to the floors for further evaluation and management. Course complicated by gram negative bacteremia and septic shock. Transferred to MICU for hypotension requiring pressors and hypoxemia on bipap.  Palliative following for pain. Patient is a 59 y/o female with a PMHX of Ph+ ALL  s/p halpo-identical SCT in 2019 and chronic arthritic back pain initially presented on May 12th with acute on chronic low back pain and profound b/l LE weakness, found with leucocytosis and pancytopenia concerning for relapse ALL, admitted to the floors for further evaluation and management. Course complicated by gram negative bacteremia and septic shock. Transferred to MICU for hypotension requiring pressors and hypoxemia, now intubated/ sedated/ vented. plan to start CRRT/CVVHD  Palliative following for pain.

## 2020-05-23 NOTE — PROCEDURE NOTE - NSTRACHPOSTINTU_RESP_A_CORE
Appropriate capnography/Breath sounds equal/Chest excursion noted/Chest X-Ray/Positive end tidal Co2 noted/Breath sounds bilateral

## 2020-05-23 NOTE — PROCEDURE NOTE - NSPOSTCAREGUIDE_GEN_A_CORE
Verbal/written post procedure instructions were given to patient/caregiver
Care for catheter as per unit/ICU protocols
Care for catheter as per unit/ICU protocols
Keep the cast/splint/dressing clean and dry/Care for catheter as per unit/ICU protocols
Care for catheter as per unit/ICU protocols

## 2020-05-23 NOTE — PROGRESS NOTE ADULT - PROBLEM SELECTOR PLAN 4
59 y/o female with leucocytosis and pancytopenia concerning for relapsed ALL, pain and profound b/l LE weakness, admitted for further management now with RRT overnight, hypotension, hypoxia, intubated,  negative bacteremia and septic shock. Transferred to MICU for hypotension requiring pressors, CRRT started.  C/w fentanyl infusion 59 y/o female with leucocytosis and pancytopenia concerning for relapsed ALL, pain and profound b/l LE weakness, admitted for further management now with RRT overnight, hypotension, hypoxia, intubated,  negative bacteremia and septic shock. Transferred to MICU for hypotension requiring pressors, CRRT started.  C/w fentanyl infusion.  Prognosis extremely grim

## 2020-05-23 NOTE — CONSULT NOTE ADULT - REASON FOR ADMISSION
numbness and weakness

## 2020-05-23 NOTE — PROCEDURE NOTE - NSINDICATIONS_GEN_A_CORE
dialysis/CRRT
long term chemotherapy
airway protection/critical patient/respiratory distress
critical patient/arterial puncture to obtain ABG's/monitoring purposes
critical illness/emergency venous access

## 2020-05-23 NOTE — PROVIDER CONTACT NOTE (CRITICAL VALUE NOTIFICATION) - SITUATION
platelets 20
ABG pH 7.06, lactate 9.1, K+ 6.7
Hgb 6.0,hct 18.3,plt 13
LOW CBC
Lactate 5.2,gct 22
Lactate 5.6
Lactate 5.8
Positive blood culture from 5/22 growth in aerobic bottle of gram negative rods
low cbc
positive blood cultures from 5/22 in aerobic and anaerobic bottle gram negative rods
pt with am labs - H/H 6.9/22.0
s/p cardiac arrest in progress
wbc=0.53   plateleys=8

## 2020-05-23 NOTE — CHART NOTE - NSCHARTNOTEFT_GEN_A_CORE
Pt was found to be in asystole following cardiac arrest x2 w/ CPR while pt was ventilated. Patient was seen and examined. No spontaneous movements were present and patient did not respond to verbal or physical stimuli. On exam, peripheral and carotid pulses were absent, lung sounds were absent in bilateral lung fields and no heart sounds were detected on auscultation over entire precordium. Patient pronounced dead at 13:46 on 5/23 confirmed and witnessed by RN at bedside. Family/next of kin were called and notified. Autopsy was offered and declined. Patient was Full Code.     Avis Jones MD  Pager [phone]

## 2020-05-23 NOTE — CHART NOTE - NSCHARTNOTEFT_GEN_A_CORE
Called by RN to evaluate Pt. for sob.  Pt seen and evaluated at bedside.      Vital Signs Last 24 Hrs  T(C): 37.7 (23 May 2020 01:56), Max: 38.4 (22 May 2020 10:45)  T(F): 99.8 (23 May 2020 01:56), Max: 101.1 (22 May 2020 10:45)  HR: 127 (23 May 2020 01:56) (99 - 132)  BP: 104/68 (23 May 2020 01:56) (83/55 - 134/83)  BP(mean): --  RR: 20 (23 May 2020 01:56) (18 - 20)  SpO2: 95% (23 May 2020 01:56) (95% - 100%)    Labs:                        7.5    <0.10 )-----------( 16       ( 23 May 2020 01:29 )             23.5       05-22    135  |  106  |  30<H>  ----------------------------<  194<H>  4.5   |  17<L>  |  0.86    Ca    9.2      22 May 2020 06:58  Phos  3.1     05-22  Mg     1.9     05-22    TPro  5.5<L>  /  Alb  2.9<L>  /  TBili  1.2  /  DBili  0.7<H>  /  AST  65<H>  /  ALT  153<H>  /  AlkPhos  71  05-22      Antimicrobials:  MEDICATIONS  (STANDING):  allopurinol 300 milliGRAM(s) Oral daily  caspofungin IVPB      caspofungin IVPB 50 milliGRAM(s) IV Intermittent every 24 hours  cefepime   IVPB 2000 milliGRAM(s) IV Intermittent every 8 hours  cefepime   IVPB      chlorhexidine 4% Liquid 1 Application(s) Topical <User Schedule>  cytarabine PF IntraThecal (eMAR) 50 milliGRAM(s) IntraThecal once  dexAMETHasone  Injectable 4 milliGRAM(s) IV Push every 6 hours  imatinib 400 milliGRAM(s) Oral two times a day  lactulose Syrup 10 Gram(s) Oral every 8 hours  levETIRAcetam 500 milliGRAM(s) Oral two times a day  lidocaine   Patch 1 Patch Transdermal every 24 hours  melatonin 5 milliGRAM(s) Oral at bedtime  methotrexate PF IntraThecal (eMAR) 12 milliGRAM(s) IntraThecal once  methotrexate PF IntraThecal (eMAR) 12 milliGRAM(s) IntraThecal once  pantoprazole    Tablet 40 milliGRAM(s) Oral before breakfast  polyethylene glycol 3350 17 Gram(s) Oral every 12 hours  senna 2 Tablet(s) Oral every 12 hours  simethicone 80 milliGRAM(s) Chew every 6 hours  sodium chloride 0.9%. 500 milliLiter(s) (75 mL/Hr) IV Continuous <Continuous>  ursodiol Capsule 300 milliGRAM(s) Oral every 12 hours  vancomycin  IVPB 1000 milliGRAM(s) IV Intermittent every 12 hours        PE:    General: Alert & Oriented x 3, non toxic, in no acute distress  Neuro: non focal  Cardiac: S1, S2, tachycardic  Pulm: Lungs CTA  GI: Abd soft, NT/ND, + bowel sounds x 4 quadrants  Ext: no edema, + pedal pulses BL  Skin: intact      AP:     1.  Neutropenic fever       - continue with current antimicrobials       - continue antipyretic/cooling measures      - continue IV hydration      - BC x 2      - UA/CS      - f/u panculture results      - cxr      - continue to closely monitor      - f/u with primary team in AM    Balbina Erickson, ANP x Called by RN to evaluate Pt. for shallow breathing, cold, clammy skin, lethargy.  Pt seen and evaluated at bedside. Pt. laying in bed, moaning, with shallow breaths. Unable to answer questions.   Pt. alert to self but lethargic.  BP 97/63; Pulse=78, T-36.3 (c).  Unable to obtain pulse ox; skin cold, clammy, diaphoretic.  RRT called due to change in status. See RRT sheet.       Balbina Erickson, MUKESH x [phone]

## 2020-05-23 NOTE — RAPID RESPONSE TEAM SUMMARY - NSSITUATIONBACKGROUNDRRT_GEN_ALL_CORE
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 Also being treated for neutropenic fever on broad spectrum antibiotics. Last culture growing GNR.     RRT called for hypotension SBP 80's. Lactate 9.1.     - Started on supplemental oxygen   - Started on Levophed for persistent hypotension  - MICU accepted patient

## 2020-05-24 LAB
-  AMIKACIN: SIGNIFICANT CHANGE UP
-  AMPICILLIN/SULBACTAM: SIGNIFICANT CHANGE UP
-  AMPICILLIN: SIGNIFICANT CHANGE UP
-  AZTREONAM: SIGNIFICANT CHANGE UP
-  CEFAZOLIN: SIGNIFICANT CHANGE UP
-  CEFEPIME: SIGNIFICANT CHANGE UP
-  CEFOXITIN: SIGNIFICANT CHANGE UP
-  CEFTRIAXONE: SIGNIFICANT CHANGE UP
-  CIPROFLOXACIN: SIGNIFICANT CHANGE UP
-  ERTAPENEM: SIGNIFICANT CHANGE UP
-  GENTAMICIN: SIGNIFICANT CHANGE UP
-  IMIPENEM: SIGNIFICANT CHANGE UP
-  LEVOFLOXACIN: SIGNIFICANT CHANGE UP
-  MEROPENEM: SIGNIFICANT CHANGE UP
-  PIPERACILLIN/TAZOBACTAM: SIGNIFICANT CHANGE UP
-  TOBRAMYCIN: SIGNIFICANT CHANGE UP
-  TRIMETHOPRIM/SULFAMETHOXAZOLE: SIGNIFICANT CHANGE UP
CULTURE RESULTS: SIGNIFICANT CHANGE UP
CULTURE RESULTS: SIGNIFICANT CHANGE UP
METHOD TYPE: SIGNIFICANT CHANGE UP
ORGANISM # SPEC MICROSCOPIC CNT: SIGNIFICANT CHANGE UP
SPECIMEN SOURCE: SIGNIFICANT CHANGE UP
SPECIMEN SOURCE: SIGNIFICANT CHANGE UP

## 2020-05-25 LAB
CULTURE RESULTS: SIGNIFICANT CHANGE UP
SPECIMEN SOURCE: SIGNIFICANT CHANGE UP

## 2020-05-26 LAB
CSF COMMENTS: SIGNIFICANT CHANGE UP
TM INTERPRETATION: SIGNIFICANT CHANGE UP

## 2020-05-29 LAB — CHROM ANALY OVERALL INTERP SPEC-IMP: SIGNIFICANT CHANGE UP

## 2020-06-02 ENCOUNTER — APPOINTMENT (OUTPATIENT)
Dept: HEMATOLOGY ONCOLOGY | Facility: CLINIC | Age: 60
End: 2020-06-02

## 2020-06-09 ENCOUNTER — APPOINTMENT (OUTPATIENT)
Dept: HEMATOLOGY ONCOLOGY | Facility: CLINIC | Age: 60
End: 2020-06-09

## 2020-06-26 NOTE — CHART NOTE - NSCHARTNOTEFT_GEN_A_CORE
[facility] Hospital Medicine  Progress Note    Patient Name: Erin Sahu  MRN: [id number]  Patient Class: IP- Inpatient   Admission Date: 6/24/2020  Length of Stay: 2 days  Attending Physician: Oscar Mckeon MD  Primary Care Provider: Junior Godinez MD    [facility] Medicine Team: [facility] HOSP MED 5 [facility] Kami Whitley MD    Subjective:     Principal Problem:Hypotension        HPI:  Erin Sahu is a 88 y.o. female with past medical history of HTN, pafib (on eliquis), HLD, T2DM, CAD, who presents with weakness. Patient states she woke up this morning with chills and was extremely weak and did not want to get out of bed. She was found by her granddaughter at home extremely weak and lightheaded and was complaining of shortness of breath and nausea. Her family measured her BP at home and it was found to be 74/40. Patient has had hypotensive episodes in the past, most recently 4/2020, at which time she was in the ICU, and it was reportedly attributed to her anti-hypertensive medication (had been recently uptitrated). EMS was called and they measured , but by arrival in the ED at [facility] the [facility] had SBP in the 80s. She also became hypoxic to 82% and was placed on 40% venti mask with improvement sats to 96%. She was given 600 cc IVF with minimal improvement in BP. Patient was noted to be listless and ill-appearing but awake and following commands.   Labs at osh ED: CR 1.12, BUN 19, AST 1626, , TB 5.1, , WBC 4.7, , normal TROP, CPK and TSH, lactate 2.7. She had a cxr done which was normal except for mild left costophrenic blunting. Her urine was reportedly normal. She was transferred to [facility] for hepatology evaluation.     Overview/Hospital Course:  Patient was admitted to hospital medicine for further management. She was started on IV Vanc/Zosyn along with IV fluids. CT abdomen/pelvis and US abdomen was unremarkable. Hepatology was consulted given elevated LFTs and believe  Upon Nutritional Assessment by the Registered Dietitian your patient was determined to meet criteria / has evidence of the following diagnosis/diagnoses:          [ ]  Mild Protein Calorie Malnutrition        [ ]  Moderate Protein Calorie Malnutrition        [ ] Severe Protein Calorie Malnutrition        [ ] Unspecified Protein Calorie Malnutrition        [ ] Underweight / BMI <19        [x] Morbid Obesity / BMI > 40      Findings as based on:  [x] Comprehensive nutrition assessment   [x] Nutrition Focused Physical Exam  [ ] Other:       Nutrition Plan/Recommendations:      Adequate intake of utmost importance at this time. Pt will achieve intentional wt loss while c good appetite and taking in healthy diet.     PROVIDER Section:     By signing this assessment you are acknowledging and agree with the diagnosis/diagnoses assigned by the Registered Dietitian    Comments: presentation is correlated with possibility of passed sludge/stone with possible cholangitis. Blood cx + GNRs. Hep panel, urine cx was negative. Patient was given IV fluids and overnight, BP improved, lactate. LFTs improving. Cr continues to uptrend.    Interval History:   NAEON. Patient denies any pain. No N/V/F/C, no SOB. Creatinine uptrending (1.6>1.9>2.4), LFTs improving.     Review of Systems   Constitutional: Positive for fatigue. Negative for appetite change, chills, diaphoresis and fever.   HENT: Negative for congestion and trouble swallowing.    Eyes: Negative for pain and visual disturbance.   Respiratory: Negative for cough, chest tightness, shortness of breath and wheezing.    Cardiovascular: Negative for chest pain, palpitations and leg swelling.   Gastrointestinal: Negative for abdominal pain, diarrhea, nausea and vomiting.   Genitourinary: Positive for decreased urine volume and difficulty urinating.   Musculoskeletal: Negative for arthralgias and myalgias.   Skin: Negative for wound.   Neurological: Positive for weakness. Negative for dizziness, light-headedness, numbness and headaches.   Psychiatric/Behavioral: Negative for confusion.     Objective:     Vital Signs (Most Recent):  Temp: 97.6 °F (36.4 °C) (06/26/20 0721)  Pulse: 67 (06/26/20 1127)  Resp: 14 (06/26/20 0721)  BP: 132/62 (06/26/20 0721)  SpO2: 98 % (06/26/20 0721) Vital Signs (24h Range):  Temp:  [97.5 °F (36.4 °C)-98.2 °F (36.8 °C)] 97.6 °F (36.4 °C)  Pulse:  [61-74] 67  Resp:  [14-20] 14  SpO2:  [95 %-99 %] 98 %  BP: ()/(55-65) 132/62     Weight: 80.7 kg (178 lb)  Body mass index is 30.55 kg/m².    Intake/Output Summary (Last 24 hours) at 6/26/2020 1254  Last data filed at 6/25/2020 1900  Gross per 24 hour   Intake 200 ml   Output --   Net 200 ml      Physical Exam  Constitutional:       Appearance: She is obese. She is not toxic-appearing.   HENT:      Head: Normocephalic and atraumatic.   Eyes:      Conjunctiva/sclera:  Conjunctivae normal.      Pupils: Pupils are equal, round, and reactive to light.   Neck:      Musculoskeletal: Normal range of motion and neck supple.   Cardiovascular:      Rate and Rhythm: Normal rate. Rhythm irregular.      Heart sounds: No murmur.   Pulmonary:      Effort: Pulmonary effort is normal. No respiratory distress.      Breath sounds: Normal breath sounds. No wheezing.      Comments: On RA  Abdominal:      General: Abdomen is flat. There is no distension.      Palpations: Abdomen is soft.      Tenderness: There is no abdominal tenderness. There is no guarding or rebound.   Musculoskeletal: Normal range of motion.         General: No swelling or tenderness.   Skin:     General: Skin is warm and dry.      Capillary Refill: Capillary refill takes less than 2 seconds.   Neurological:      General: No focal deficit present.      Mental Status: She is alert and oriented to person, place, and time.      Comments: A&Ox4   Psychiatric:         Mood and Affect: Mood normal.         Behavior: Behavior normal.         Thought Content: Thought content normal.         Judgment: Judgment normal.         Significant Labs:   CBC:   Recent Labs   Lab 06/24/20 1955 06/25/20 0440 06/26/20  0452   WBC 13.60* 10.56 8.49   HGB 13.9 12.4 12.7   HCT 41.5 37.6 37.9   * 91* 87*     CMP:   Recent Labs   Lab 06/24/20 1955 06/25/20 0440 06/26/20  0452    137 135*   K 3.9 4.5 3.8   CL 99 100 97   CO2 25 26 27   * 101 73   BUN 24* 30* 38*   CREATININE 1.6* 1.9* 2.4*   CALCIUM 8.6* 8.1* 8.5*   PROT 5.6* 4.9* 5.3*   ALBUMIN 3.0* 2.6* 2.7*   BILITOT 6.2* 5.2* 2.8*   ALKPHOS 214* 168* 134   * 479* 180*   * 439* 274*   ANIONGAP 13 11 11   EGFRNONAA 28.2* 22.9* 17.3*       Significant Imaging: I have reviewed and interpreted all pertinent imaging results/findings within the past 24 hours.      Assessment/Plan:      * Hypotension  88 y.o. female with h/o HTN, pafib (on eliquis), HLD, T2DM, CAD, who  presents with weakness, chills, shortness of breath, and nausea. Hypotensive to 70s SBP extremely weak and lightheaded and was complaining of shortness of breath and nausea. Her family measured her BP at home and it was found to be 74/40.  - Labs at [facility] ED: CR 1.12, BUN 19, AST 1626, , TB 5.1, , WBC 4.7, , normal TROP, CPK and TSH, lactate 2.7. She had a cxr done which was normal except for mild left costophrenic blunting. Her urine was reportedly normal.   - Etiology of hypotension: Likely Sepsis vs. Medication induced vs. Cardiogenic   - transaminitis: shock liver (w/ severe hypotension at home) vs. Drug induced (statin) vs. Viral (no known exposures)  - COVID negative    Plan:  -Blood cx with GNRs.   -On IV Zosyn with improvement of leukocytosis, LFTs, BP   - CT/US abdomen unremarkable  - Hepatology consulted and believe presentation is 2/2 passed choledocholithiasis vs bile duct sludge.   -Urine cx negative. Hep panel negative  -f/u MRCP  -ECHO 6/25: EF 60% with grade II diastolic dysfunction.    DINO (acute kidney injury)  Cr: 1.6>1.9>2.4. DINO likely 2/2 sepsis    IV fluids  CBC daily  F/u renal panel  Will continue to monitor      Transaminitis  Likely 2/2 sepsis 2/2  choledocholithiasis vs bile duct sludge vs cholangitis    Improving.  AST: 832>479>180   ALT: 633>439>274       Alk Phos: 214>168>134  Tbili: 6.2>5.2>2.8    -Continue IV Zosyn  -F/u MRCP  -Hold eliquis for possible ERCP            PAF (paroxysmal atrial fibrillation)  H/o pafib: on coreg and eliquis. Hold coreg for hypotension. Hold eliquis for possible ERCP.  H/o CAD, unclear if intervention. Hold statin for transaminitis      Essential hypertension  Holding home anti-hypertensives for hypotension      Diabetes mellitus with neuropathy  On metformin at home, hold  Start LDSSI, accuchecks      VTE Risk Mitigation (From admission, onward)         Ordered     heparin (porcine) injection 5,000 Units  Every 8 hours      06/26/20  1414     IP VTE HIGH RISK PATIENT  Once      06/25/20 0715     Place sequential compression device  Until discontinued      06/25/20 0715     Place sequential compression device  Until discontinued      06/24/20 2011                      Rebecca Kami Whitley MD  Department of Hospital Medicine   [facility]

## 2020-06-30 ENCOUNTER — APPOINTMENT (OUTPATIENT)
Dept: HEMATOLOGY ONCOLOGY | Facility: CLINIC | Age: 60
End: 2020-06-30

## 2020-07-28 ENCOUNTER — APPOINTMENT (OUTPATIENT)
Dept: HEMATOLOGY ONCOLOGY | Facility: CLINIC | Age: 60
End: 2020-07-28

## 2020-08-04 ENCOUNTER — APPOINTMENT (OUTPATIENT)
Dept: HEMATOLOGY ONCOLOGY | Facility: CLINIC | Age: 60
End: 2020-08-04

## 2020-08-25 ENCOUNTER — APPOINTMENT (OUTPATIENT)
Dept: HEMATOLOGY ONCOLOGY | Facility: CLINIC | Age: 60
End: 2020-08-25

## 2020-09-01 ENCOUNTER — APPOINTMENT (OUTPATIENT)
Dept: HEMATOLOGY ONCOLOGY | Facility: CLINIC | Age: 60
End: 2020-09-01

## 2020-09-22 ENCOUNTER — APPOINTMENT (OUTPATIENT)
Dept: HEMATOLOGY ONCOLOGY | Facility: CLINIC | Age: 60
End: 2020-09-22

## 2020-09-29 ENCOUNTER — APPOINTMENT (OUTPATIENT)
Dept: HEMATOLOGY ONCOLOGY | Facility: CLINIC | Age: 60
End: 2020-09-29

## 2020-10-20 ENCOUNTER — APPOINTMENT (OUTPATIENT)
Dept: HEMATOLOGY ONCOLOGY | Facility: CLINIC | Age: 60
End: 2020-10-20

## 2020-10-27 ENCOUNTER — APPOINTMENT (OUTPATIENT)
Dept: HEMATOLOGY ONCOLOGY | Facility: CLINIC | Age: 60
End: 2020-10-27

## 2020-12-01 NOTE — ED ADULT TRIAGE NOTE - CADM TRG TX PRIOR TO ARRIVAL
none

## 2022-04-22 NOTE — ED PROVIDER NOTE - OBJECTIVE STATEMENT
Patient had a virtual appointment today with Dr Elsie Corral that was cancel   Guille Osborne stated patient came out of the hospital today 04/22/2022 and he was not up to do the virtual visit  Patient will call back later to schedule a f/u appt  PMD/ Heme-Onc : Luke Bentley    This is a 59 year old female with hx of Ph+ ALL PMD/ Heme-Onc : Luke Bentley    This is a 59 year old female with hx of Ph+ ALL, hx of SDH and recent admission for HPC transplant who [place] [facility] [place]. Patient reports that she had drank a lot of water today and subsequently threw up today. Otherwise, denies fevers, chills, productive cough, congestion, diarrhea or urinary symptoms. Patient does state her granddaughter was sick and had visited with her yesterday.

## 2022-06-21 NOTE — ED CLERICAL - NS ED CLERK UNITS
Pt reports lower back pain and generalized left leg pain. Left leg does not appear enlarged, red, hot or swollen. Right leg tenderness on palpation. Pedal pulse palpated +1. Bilateral foot pitting edema.   APER

## 2022-08-27 NOTE — DISCHARGE NOTE ADULT - PROVIDER TOKENS
[de-identified] : 
TIERA:'[id number]'

## 2022-09-15 NOTE — PROGRESS NOTE ADULT - PROBLEM SELECTOR PLAN 5
Patient with no saddle anesthesia, no cord compression noted   NSX following, awaiting MRI as above    PT eval severe weakness    CT Head showing increased soft tissue prominence in the region of the hypothalamus/pituitary stalk - pending MR head for further evaluate No psychiatric contraindications to discharge

## 2023-07-28 NOTE — PHYSICAL EXAM
Pt would like to  copy    [Ambulatory and capable of all self care but unable to carry out any work activities] : Status 2- Ambulatory and capable of all self care but unable to carry out any work activities. Up and about more than 50% of waking hours [Normal] : affect appropriate [de-identified] : stable gait

## 2023-10-25 NOTE — PATIENT PROFILE ADULT. - FALL HARM RISK CONCLUSION
Quality 130: Documentation Of Current Medications In The Medical Record: Current Medications Documented Quality 47: Advance Care Plan: Advance Care Planning discussed and documented; advance care plan or surrogate decision maker documented in the medical record. Detail Level: Detailed Quality 226: Preventive Care And Screening: Tobacco Use: Screening And Cessation Intervention: Tobacco Screening not Performed Fall with Harm Risk

## 2023-11-19 NOTE — DISCHARGE NOTE ADULT - INFLUENZA IMMUNIZATION DATE (APPROXIMATE):
AVS reviewed with patient and spouse. Patient educated on importance of monitoring BP at home. Patient has BP cuff and keeps a log of her blood pressure at home. IV removed per protocol. All questions answered. Patient verbalized understanding.   20-Nov-2018

## 2023-11-21 NOTE — PROGRESS NOTE ADULT - PROBLEM SELECTOR PLAN 2
Patient/Caregiver provided printed discharge information. Pt is neutropenic, afebrile   If febrile Pan Cx and CXR  Continue Mycamine for ppx (No azoles with Inotuzamab) and Meropenem   10/16 CT C/A/P with Scattered and patchy ground-glass opacities in the right upper and lower lobes, possibly infection   ID following

## 2024-12-07 NOTE — REASON FOR VISIT
[Follow-Up Visit] : a follow-up visit for [Acute Lymphoblastic Leukemia] : acute lymphoblastic leukemia [Family Member] : family member [FreeTextEntry2] :  s/p Masha-Auto on 12/16/16 N/A

## 2025-03-21 NOTE — DISCHARGE NOTE ADULT - NS DC ANGIO PCI YN
ANTICOAGULATION THERAPY EDUCATION   PATIENT  INSTRUCTION    Your Guide to Using Warfarin:  Please refer to this handout for questions regarding your medication, Coumadin/Warfarin. This handout includes information on dosing, blood testing, possible side effects of Coumadin/Warfarin, using other medications, dietary guidelines and safety concerns while on anticoagulation therapy.    Please contact your primary care physician and/or seek appropriate medical care if you experience:  A serious fall  Increased menstrual bleeding   An injury to your head  Notice a different color urine or stool   Increased bleeding with teeth brushing   If you have any other unusual bruising   Have bleeding from your nose or a cut that doesn't stop bleeding         Call your physician immediately if you notice any signs and symptoms of a blood clot such as:  Sudden weakness in any limb  Dizziness or faintness   Numbness or tingling anywhere  New shortness of breath or chest pain   Sudden onset of slurred speech or inability to speak  New pain, swelling, redness or heat in any extremity   Visual changes or loss of sight in either eye         Other factors that may affect your anticoagulation therapy include:  Fever  Stress   Diarrhea  Changes in exercise/activity level   Vomiting         While on Anticoagulation therapy avoid:  Pregnancy, using birth control and hormone replacement therapy    Body piercing or tattoos      Please inform family members and other health care providers that you are on anticoagulation therapy. Make sure to carry an up-to-date medication list. You can also wear a medical alert bracelet to identify that you are on anticoagulation therapy.    If you are utilizing an injectable anticoagulation medication you should be aware of how and where the medication should be injected and how to appropriately dispose of the injection needles (sharps).    Additional patient education materials provided to you:  Important  Facts about your Coumadin (color handout)  Vitamin K Food List  Travel Fitness Tips for Patients on Coumadin  Prevention and Treatment of Nosebleeds  When To Call Your Physician  Potential and Documented Interaction of Herbs with Coumadin/Warfarin  Lab hours for [facility]   no
